# Patient Record
Sex: MALE | Race: WHITE | Employment: UNEMPLOYED | URBAN - METROPOLITAN AREA
[De-identification: names, ages, dates, MRNs, and addresses within clinical notes are randomized per-mention and may not be internally consistent; named-entity substitution may affect disease eponyms.]

---

## 2017-03-07 ENCOUNTER — APPOINTMENT (OUTPATIENT)
Dept: GENERAL RADIOLOGY | Age: 37
DRG: 638 | End: 2017-03-07
Attending: EMERGENCY MEDICINE
Payer: SELF-PAY

## 2017-03-07 ENCOUNTER — HOSPITAL ENCOUNTER (INPATIENT)
Age: 37
LOS: 2 days | Discharge: HOME | DRG: 638 | End: 2017-03-09
Attending: EMERGENCY MEDICINE | Admitting: EMERGENCY MEDICINE
Payer: SELF-PAY

## 2017-03-07 DIAGNOSIS — E86.0 DEHYDRATION: ICD-10-CM

## 2017-03-07 DIAGNOSIS — D72.829 LEUKOCYTOSIS, UNSPECIFIED TYPE: ICD-10-CM

## 2017-03-07 DIAGNOSIS — E08.10 DIABETIC KETOACIDOSIS WITHOUT COMA ASSOCIATED WITH DIABETES MELLITUS DUE TO UNDERLYING CONDITION (HCC): Primary | ICD-10-CM

## 2017-03-07 LAB
ALBUMIN SERPL BCP-MCNC: 4.6 G/DL (ref 3.5–5)
ALBUMIN/GLOB SERPL: 1.1 {RATIO} (ref 1.1–2.2)
ALP SERPL-CCNC: 274 U/L (ref 45–117)
ALT SERPL-CCNC: 80 U/L (ref 12–78)
AMPHET UR QL SCN: NEGATIVE
ANION GAP BLD CALC-SCNC: 12 MMOL/L (ref 5–15)
ANION GAP BLD CALC-SCNC: 26 MMOL/L (ref 5–15)
ANION GAP BLD CALC-SCNC: 33 MMOL/L (ref 5–15)
ANION GAP BLD CALC-SCNC: 36 MMOL/L (ref 5–15)
APPEARANCE UR: CLEAR
ARTERIAL PATENCY WRIST A: YES
AST SERPL W P-5'-P-CCNC: 30 U/L (ref 15–37)
ATRIAL RATE: 114 BPM
BACTERIA URNS QL MICRO: NEGATIVE /HPF
BARBITURATES UR QL SCN: NEGATIVE
BASE DEFICIT BLDA-SCNC: 20.1 MMOL/L
BASOPHILS # BLD AUTO: 0 K/UL (ref 0–0.1)
BASOPHILS # BLD: 0 % (ref 0–1)
BDY SITE: ABNORMAL
BENZODIAZ UR QL: NEGATIVE
BILIRUB SERPL-MCNC: 0.7 MG/DL (ref 0.2–1)
BILIRUB UR QL: NEGATIVE
BREATHS.SPONTANEOUS ON VENT: 18
BUN BLD-MCNC: 42 MG/DL (ref 9–20)
BUN SERPL-MCNC: 34 MG/DL (ref 6–20)
BUN SERPL-MCNC: 34 MG/DL (ref 6–20)
BUN SERPL-MCNC: 38 MG/DL (ref 6–20)
BUN/CREAT SERPL: 21 (ref 12–20)
BUN/CREAT SERPL: 25 (ref 12–20)
BUN/CREAT SERPL: 25 (ref 12–20)
CA-I BLD-MCNC: 1.14 MMOL/L (ref 1.12–1.32)
CALCIUM SERPL-MCNC: 7.8 MG/DL (ref 8.5–10.1)
CALCIUM SERPL-MCNC: 7.9 MG/DL (ref 8.5–10.1)
CALCIUM SERPL-MCNC: 9.2 MG/DL (ref 8.5–10.1)
CALCULATED P AXIS, ECG09: -24 DEGREES
CALCULATED R AXIS, ECG10: -60 DEGREES
CALCULATED T AXIS, ECG11: -12 DEGREES
CANNABINOIDS UR QL SCN: POSITIVE
CHLORIDE BLD-SCNC: 96 MMOL/L (ref 98–107)
CHLORIDE SERPL-SCNC: 108 MMOL/L (ref 97–108)
CHLORIDE SERPL-SCNC: 112 MMOL/L (ref 97–108)
CHLORIDE SERPL-SCNC: 89 MMOL/L (ref 97–108)
CO2 BLD-SCNC: 11 MMOL/L (ref 21–32)
CO2 SERPL-SCNC: 12 MMOL/L (ref 21–32)
CO2 SERPL-SCNC: 20 MMOL/L (ref 21–32)
CO2 SERPL-SCNC: 9 MMOL/L (ref 21–32)
COCAINE UR QL SCN: NEGATIVE
COLOR UR: ABNORMAL
CREAT BLD-MCNC: 1.2 MG/DL (ref 0.6–1.3)
CREAT SERPL-MCNC: 1.36 MG/DL (ref 0.7–1.3)
CREAT SERPL-MCNC: 1.37 MG/DL (ref 0.7–1.3)
CREAT SERPL-MCNC: 1.83 MG/DL (ref 0.7–1.3)
DIAGNOSIS, 93000: NORMAL
DRUG SCRN COMMENT,DRGCM: ABNORMAL
EOSINOPHIL # BLD: 0 K/UL (ref 0–0.4)
EOSINOPHIL NFR BLD: 0 % (ref 0–7)
EPITH CASTS URNS QL MICRO: ABNORMAL /LPF
ERYTHROCYTE [DISTWIDTH] IN BLOOD BY AUTOMATED COUNT: 13.2 % (ref 11.5–14.5)
EST. AVERAGE GLUCOSE BLD GHB EST-MCNC: 372 MG/DL
GLOBULIN SER CALC-MCNC: 4.1 G/DL (ref 2–4)
GLUCOSE BLD STRIP.AUTO-MCNC: 160 MG/DL (ref 65–100)
GLUCOSE BLD STRIP.AUTO-MCNC: 180 MG/DL (ref 65–100)
GLUCOSE BLD STRIP.AUTO-MCNC: 210 MG/DL (ref 65–100)
GLUCOSE BLD STRIP.AUTO-MCNC: 215 MG/DL (ref 65–100)
GLUCOSE BLD STRIP.AUTO-MCNC: 227 MG/DL (ref 65–100)
GLUCOSE BLD STRIP.AUTO-MCNC: 236 MG/DL (ref 65–100)
GLUCOSE BLD STRIP.AUTO-MCNC: 250 MG/DL (ref 65–100)
GLUCOSE BLD STRIP.AUTO-MCNC: 333 MG/DL (ref 65–100)
GLUCOSE BLD STRIP.AUTO-MCNC: 413 MG/DL (ref 65–100)
GLUCOSE BLD STRIP.AUTO-MCNC: 541 MG/DL (ref 65–100)
GLUCOSE BLD STRIP.AUTO-MCNC: >600 MG/DL (ref 65–100)
GLUCOSE BLD-MCNC: 646 MG/DL (ref 75–110)
GLUCOSE SERPL-MCNC: 236 MG/DL (ref 65–100)
GLUCOSE SERPL-MCNC: 307 MG/DL (ref 65–100)
GLUCOSE SERPL-MCNC: 660 MG/DL (ref 65–100)
GLUCOSE UR STRIP.AUTO-MCNC: >1000 MG/DL
HBA1C MFR BLD: 14.6 % (ref 4.2–6.3)
HCO3 BLDA-SCNC: 6 MMOL/L (ref 22–26)
HCT VFR BLD AUTO: 46.5 % (ref 36.6–50.3)
HCT VFR BLD CALC: 54 % (ref 36.6–50.3)
HGB BLD-MCNC: 16.6 G/DL (ref 12.1–17)
HGB BLD-MCNC: 18.4 GM/DL (ref 12.1–17)
HGB UR QL STRIP: NEGATIVE
HYALINE CASTS URNS QL MICRO: ABNORMAL /LPF (ref 0–5)
KETONES UR QL STRIP.AUTO: 80 MG/DL
LEUKOCYTE ESTERASE UR QL STRIP.AUTO: NEGATIVE
LIPASE SERPL-CCNC: 79 U/L (ref 73–393)
LYMPHOCYTES # BLD AUTO: 14 % (ref 12–49)
LYMPHOCYTES # BLD: 3.2 K/UL (ref 0.8–3.5)
MAGNESIUM SERPL-MCNC: 2.3 MG/DL (ref 1.6–2.4)
MAGNESIUM SERPL-MCNC: 3 MG/DL (ref 1.6–2.4)
MCH RBC QN AUTO: 32.9 PG (ref 26–34)
MCHC RBC AUTO-ENTMCNC: 35.7 G/DL (ref 30–36.5)
MCV RBC AUTO: 92.3 FL (ref 80–99)
METHADONE UR QL: NEGATIVE
MONOCYTES # BLD: 1.2 K/UL (ref 0–1)
MONOCYTES NFR BLD AUTO: 5 % (ref 5–13)
NEUTS SEG # BLD: 18.7 K/UL (ref 1.8–8)
NEUTS SEG NFR BLD AUTO: 81 % (ref 32–75)
NITRITE UR QL STRIP.AUTO: NEGATIVE
OPIATES UR QL: NEGATIVE
P-R INTERVAL, ECG05: 144 MS
PCO2 BLDA: 16 MMHG (ref 35–45)
PCP UR QL: NEGATIVE
PH BLDA: 7.18 [PH] (ref 7.35–7.45)
PH UR STRIP: 5 [PH] (ref 5–8)
PHOSPHATE SERPL-MCNC: 1.4 MG/DL (ref 2.6–4.7)
PHOSPHATE SERPL-MCNC: 8 MG/DL (ref 2.6–4.7)
PLATELET # BLD AUTO: 302 K/UL (ref 150–400)
PO2 BLDA: 134 MMHG (ref 80–100)
POTASSIUM BLD-SCNC: 4.7 MMOL/L (ref 3.5–5.1)
POTASSIUM SERPL-SCNC: 3.7 MMOL/L (ref 3.5–5.1)
POTASSIUM SERPL-SCNC: 4.7 MMOL/L (ref 3.5–5.1)
POTASSIUM SERPL-SCNC: 4.7 MMOL/L (ref 3.5–5.1)
PROT SERPL-MCNC: 8.7 G/DL (ref 6.4–8.2)
PROT UR STRIP-MCNC: NEGATIVE MG/DL
Q-T INTERVAL, ECG07: 350 MS
QRS DURATION, ECG06: 94 MS
QTC CALCULATION (BEZET), ECG08: 482 MS
RBC # BLD AUTO: 5.04 M/UL (ref 4.1–5.7)
RBC #/AREA URNS HPF: ABNORMAL /HPF (ref 0–5)
RBC MORPH BLD: ABNORMAL
SAO2 % BLD: 98 % (ref 92–97)
SAO2% DEVICE SAO2% SENSOR NAME: ABNORMAL
SERVICE CMNT-IMP: ABNORMAL
SODIUM BLD-SCNC: 134 MMOL/L (ref 136–145)
SODIUM SERPL-SCNC: 134 MMOL/L (ref 136–145)
SODIUM SERPL-SCNC: 144 MMOL/L (ref 136–145)
SODIUM SERPL-SCNC: 146 MMOL/L (ref 136–145)
SP GR UR REFRACTOMETRY: 1.03 (ref 1–1.03)
SPECIMEN SITE: ABNORMAL
TROPONIN I SERPL-MCNC: <0.04 NG/ML
UROBILINOGEN UR QL STRIP.AUTO: 0.2 EU/DL (ref 0.2–1)
VENTRICULAR RATE, ECG03: 114 BPM
WBC # BLD AUTO: 23.1 K/UL (ref 4.1–11.1)
WBC URNS QL MICRO: ABNORMAL /HPF (ref 0–4)

## 2017-03-07 PROCEDURE — 74011000250 HC RX REV CODE- 250: Performed by: EMERGENCY MEDICINE

## 2017-03-07 PROCEDURE — 65660000000 HC RM CCU STEPDOWN

## 2017-03-07 PROCEDURE — 74011000258 HC RX REV CODE- 258: Performed by: EMERGENCY MEDICINE

## 2017-03-07 PROCEDURE — 84484 ASSAY OF TROPONIN QUANT: CPT | Performed by: EMERGENCY MEDICINE

## 2017-03-07 PROCEDURE — 80048 BASIC METABOLIC PNL TOTAL CA: CPT | Performed by: INTERNAL MEDICINE

## 2017-03-07 PROCEDURE — 74011636637 HC RX REV CODE- 636/637: Performed by: EMERGENCY MEDICINE

## 2017-03-07 PROCEDURE — 36600 WITHDRAWAL OF ARTERIAL BLOOD: CPT | Performed by: EMERGENCY MEDICINE

## 2017-03-07 PROCEDURE — 85025 COMPLETE CBC W/AUTO DIFF WBC: CPT | Performed by: EMERGENCY MEDICINE

## 2017-03-07 PROCEDURE — 83735 ASSAY OF MAGNESIUM: CPT | Performed by: EMERGENCY MEDICINE

## 2017-03-07 PROCEDURE — 80047 BASIC METABLC PNL IONIZED CA: CPT

## 2017-03-07 PROCEDURE — 83735 ASSAY OF MAGNESIUM: CPT | Performed by: INTERNAL MEDICINE

## 2017-03-07 PROCEDURE — 94760 N-INVAS EAR/PLS OXIMETRY 1: CPT

## 2017-03-07 PROCEDURE — 74011250636 HC RX REV CODE- 250/636: Performed by: EMERGENCY MEDICINE

## 2017-03-07 PROCEDURE — 82803 BLOOD GASES ANY COMBINATION: CPT | Performed by: EMERGENCY MEDICINE

## 2017-03-07 PROCEDURE — 96375 TX/PRO/DX INJ NEW DRUG ADDON: CPT

## 2017-03-07 PROCEDURE — 96361 HYDRATE IV INFUSION ADD-ON: CPT

## 2017-03-07 PROCEDURE — 99285 EMERGENCY DEPT VISIT HI MDM: CPT

## 2017-03-07 PROCEDURE — 96376 TX/PRO/DX INJ SAME DRUG ADON: CPT

## 2017-03-07 PROCEDURE — 96374 THER/PROPH/DIAG INJ IV PUSH: CPT

## 2017-03-07 PROCEDURE — 36415 COLL VENOUS BLD VENIPUNCTURE: CPT | Performed by: INTERNAL MEDICINE

## 2017-03-07 PROCEDURE — 84100 ASSAY OF PHOSPHORUS: CPT | Performed by: INTERNAL MEDICINE

## 2017-03-07 PROCEDURE — 83036 HEMOGLOBIN GLYCOSYLATED A1C: CPT | Performed by: EMERGENCY MEDICINE

## 2017-03-07 PROCEDURE — 80048 BASIC METABOLIC PNL TOTAL CA: CPT | Performed by: EMERGENCY MEDICINE

## 2017-03-07 PROCEDURE — 84100 ASSAY OF PHOSPHORUS: CPT | Performed by: EMERGENCY MEDICINE

## 2017-03-07 PROCEDURE — 80307 DRUG TEST PRSMV CHEM ANLYZR: CPT | Performed by: EMERGENCY MEDICINE

## 2017-03-07 PROCEDURE — 71010 XR CHEST PORT: CPT

## 2017-03-07 PROCEDURE — 81003 URINALYSIS AUTO W/O SCOPE: CPT | Performed by: EMERGENCY MEDICINE

## 2017-03-07 PROCEDURE — 36415 COLL VENOUS BLD VENIPUNCTURE: CPT | Performed by: EMERGENCY MEDICINE

## 2017-03-07 PROCEDURE — 82962 GLUCOSE BLOOD TEST: CPT

## 2017-03-07 PROCEDURE — 74011000250 HC RX REV CODE- 250: Performed by: INTERNAL MEDICINE

## 2017-03-07 PROCEDURE — 74011250637 HC RX REV CODE- 250/637: Performed by: INTERNAL MEDICINE

## 2017-03-07 PROCEDURE — 93005 ELECTROCARDIOGRAM TRACING: CPT

## 2017-03-07 PROCEDURE — 80053 COMPREHEN METABOLIC PANEL: CPT | Performed by: EMERGENCY MEDICINE

## 2017-03-07 PROCEDURE — 74011250636 HC RX REV CODE- 250/636: Performed by: INTERNAL MEDICINE

## 2017-03-07 PROCEDURE — 83690 ASSAY OF LIPASE: CPT | Performed by: EMERGENCY MEDICINE

## 2017-03-07 RX ORDER — MUPIROCIN 20 MG/G
OINTMENT TOPICAL EVERY 12 HOURS
Status: DISCONTINUED | OUTPATIENT
Start: 2017-03-07 | End: 2017-03-07

## 2017-03-07 RX ORDER — POTASSIUM CHLORIDE AND SODIUM CHLORIDE 450; 150 MG/100ML; MG/100ML
INJECTION, SOLUTION INTRAVENOUS CONTINUOUS
Status: DISCONTINUED | OUTPATIENT
Start: 2017-03-07 | End: 2017-03-07

## 2017-03-07 RX ORDER — MAGNESIUM SULFATE 100 %
4 CRYSTALS MISCELLANEOUS AS NEEDED
Status: DISCONTINUED | OUTPATIENT
Start: 2017-03-07 | End: 2017-03-09 | Stop reason: HOSPADM

## 2017-03-07 RX ORDER — SODIUM CHLORIDE 0.9 % (FLUSH) 0.9 %
5-10 SYRINGE (ML) INJECTION AS NEEDED
Status: DISCONTINUED | OUTPATIENT
Start: 2017-03-07 | End: 2017-03-07

## 2017-03-07 RX ORDER — DEXTROSE 50 % IN WATER (D50W) INTRAVENOUS SYRINGE
12.5-25 AS NEEDED
Status: DISCONTINUED | OUTPATIENT
Start: 2017-03-07 | End: 2017-03-09 | Stop reason: HOSPADM

## 2017-03-07 RX ORDER — FAMOTIDINE 10 MG/ML
20 INJECTION INTRAVENOUS
Status: DISCONTINUED | OUTPATIENT
Start: 2017-03-07 | End: 2017-03-07 | Stop reason: SDUPTHER

## 2017-03-07 RX ORDER — MORPHINE SULFATE 10 MG/ML
2 INJECTION, SOLUTION INTRAMUSCULAR; INTRAVENOUS
Status: DISCONTINUED | OUTPATIENT
Start: 2017-03-07 | End: 2017-03-09 | Stop reason: HOSPADM

## 2017-03-07 RX ORDER — HEPARIN SODIUM 5000 [USP'U]/ML
5000 INJECTION, SOLUTION INTRAVENOUS; SUBCUTANEOUS EVERY 8 HOURS
Status: DISCONTINUED | OUTPATIENT
Start: 2017-03-07 | End: 2017-03-08

## 2017-03-07 RX ORDER — DEXTROSE, SODIUM CHLORIDE, AND POTASSIUM CHLORIDE 5; .9; .15 G/100ML; G/100ML; G/100ML
150 INJECTION INTRAVENOUS CONTINUOUS
Status: DISCONTINUED | OUTPATIENT
Start: 2017-03-07 | End: 2017-03-07

## 2017-03-07 RX ORDER — DEXTROSE, SODIUM CHLORIDE, AND POTASSIUM CHLORIDE 5; .45; .3 G/100ML; G/100ML; G/100ML
INJECTION INTRAVENOUS CONTINUOUS
Status: DISCONTINUED | OUTPATIENT
Start: 2017-03-07 | End: 2017-03-08

## 2017-03-07 RX ORDER — ONDANSETRON 2 MG/ML
4 INJECTION INTRAMUSCULAR; INTRAVENOUS
Status: DISCONTINUED | OUTPATIENT
Start: 2017-03-07 | End: 2017-03-09 | Stop reason: HOSPADM

## 2017-03-07 RX ORDER — POTASSIUM CHLORIDE 7.45 MG/ML
10 INJECTION INTRAVENOUS
Status: COMPLETED | OUTPATIENT
Start: 2017-03-07 | End: 2017-03-07

## 2017-03-07 RX ORDER — SODIUM CHLORIDE 0.9 % (FLUSH) 0.9 %
5-10 SYRINGE (ML) INJECTION AS NEEDED
Status: DISCONTINUED | OUTPATIENT
Start: 2017-03-07 | End: 2017-03-09 | Stop reason: HOSPADM

## 2017-03-07 RX ORDER — MUPIROCIN 20 MG/G
OINTMENT TOPICAL EVERY 12 HOURS
Status: DISCONTINUED | OUTPATIENT
Start: 2017-03-07 | End: 2017-03-09 | Stop reason: HOSPADM

## 2017-03-07 RX ORDER — SODIUM CHLORIDE 0.9 % (FLUSH) 0.9 %
5-10 SYRINGE (ML) INJECTION EVERY 8 HOURS
Status: DISCONTINUED | OUTPATIENT
Start: 2017-03-07 | End: 2017-03-09 | Stop reason: HOSPADM

## 2017-03-07 RX ORDER — SODIUM CHLORIDE 0.9 % (FLUSH) 0.9 %
5-10 SYRINGE (ML) INJECTION EVERY 8 HOURS
Status: DISCONTINUED | OUTPATIENT
Start: 2017-03-07 | End: 2017-03-07

## 2017-03-07 RX ADMIN — POTASSIUM CHLORIDE 10 MEQ: 10 INJECTION, SOLUTION INTRAVENOUS at 18:22

## 2017-03-07 RX ADMIN — Medication 10 ML: at 23:03

## 2017-03-07 RX ADMIN — FAMOTIDINE 20 MG: 10 INJECTION, SOLUTION INTRAVENOUS at 09:11

## 2017-03-07 RX ADMIN — SODIUM CHLORIDE 8.2 UNITS/HR: 900 INJECTION, SOLUTION INTRAVENOUS at 12:55

## 2017-03-07 RX ADMIN — SODIUM CHLORIDE 2000 ML: 900 INJECTION, SOLUTION INTRAVENOUS at 09:11

## 2017-03-07 RX ADMIN — MUPIROCIN: 20 OINTMENT TOPICAL at 22:50

## 2017-03-07 RX ADMIN — Medication 10 ML: at 14:07

## 2017-03-07 RX ADMIN — DEXTROSE MONOHYDRATE, SODIUM CHLORIDE, AND POTASSIUM CHLORIDE: 50; 4.5; 2.98 INJECTION, SOLUTION INTRAVENOUS at 15:27

## 2017-03-07 RX ADMIN — HEPARIN SODIUM 5000 UNITS: 5000 INJECTION, SOLUTION INTRAVENOUS; SUBCUTANEOUS at 14:11

## 2017-03-07 RX ADMIN — POTASSIUM CHLORIDE 10 MEQ: 10 INJECTION, SOLUTION INTRAVENOUS at 14:37

## 2017-03-07 RX ADMIN — SODIUM CHLORIDE 9.6 UNITS/HR: 900 INJECTION, SOLUTION INTRAVENOUS at 10:27

## 2017-03-07 RX ADMIN — SODIUM CHLORIDE AND POTASSIUM CHLORIDE: 4.5; 1.49 INJECTION, SOLUTION INTRAVENOUS at 12:58

## 2017-03-07 RX ADMIN — HEPARIN SODIUM 5000 UNITS: 5000 INJECTION, SOLUTION INTRAVENOUS; SUBCUTANEOUS at 22:59

## 2017-03-07 RX ADMIN — ONDANSETRON HYDROCHLORIDE 4 MG: 2 INJECTION, SOLUTION INTRAMUSCULAR; INTRAVENOUS at 13:48

## 2017-03-07 RX ADMIN — POTASSIUM CHLORIDE 10 MEQ: 10 INJECTION, SOLUTION INTRAVENOUS at 16:03

## 2017-03-07 RX ADMIN — DEXTROSE MONOHYDRATE, SODIUM CHLORIDE, AND POTASSIUM CHLORIDE: 50; 4.5; 2.98 INJECTION, SOLUTION INTRAVENOUS at 22:49

## 2017-03-07 RX ADMIN — SODIUM CHLORIDE 7.1 UNITS/HR: 900 INJECTION, SOLUTION INTRAVENOUS at 11:35

## 2017-03-07 RX ADMIN — MORPHINE SULFATE 2 MG: 10 INJECTION INTRAMUSCULAR; INTRAVENOUS; SUBCUTANEOUS at 13:51

## 2017-03-07 RX ADMIN — SODIUM CHLORIDE 10 MG: 9 INJECTION INTRAMUSCULAR; INTRAVENOUS; SUBCUTANEOUS at 16:01

## 2017-03-07 RX ADMIN — INSULIN HUMAN 10 UNITS: 100 INJECTION, SOLUTION PARENTERAL at 09:45

## 2017-03-07 NOTE — H&P
[facility], [address]   HISTORY AND PHYSICAL       Name:  Trev Angeles   MR#:  [id number]   :  1980   Account #:  [de-identified]        Date of Adm:  2017       CHIEF COMPLAINT: Abdominal pain and vomiting. HISTORY OF PRESENT ILLNESS: The patient is a 72-year-old white   male with a past medical history as documented below. The patient   came to the hospital complaining of nausea, vomiting and abdominal   pain since yesterday. As per the patient, he started having all of those   symptoms yesterday, he checked his blood sugars this morning and   the reading was high, so he decided to come to the ER. The patient   also reported that he had a couple of episodes of diarrhea since   yesterday, but it has subsided now. He denies any other complaints   right now. Not talking that much and lethargic. PAST MEDICAL HISTORY: Diabetes type 1, depression, bipolar   disorder, noncompliance with the medications, seizures. PAST SURGICAL HISTORY: Left with extraction, left foot surgery. FAMILY HISTORY: Diabetes, hypertension, heart disease in mother   and father. SOCIAL HISTORY: He is single. Smokes 1 pack per day. No drug   abuse or alcohol abuse reported. ALLERGIES: NONE. HOME MEDICATIONS: The patient is unable to tell me about the   home medications right now. REVIEW OF SYSTEMS: Cannot be done as the patient is lethargic,   not taking part. VITAL SIGNS: Temperature 97.4, pulse is 115, respiratory rate 14,   blood pressure 133/70, saturating 98% on room air. LABORATORY: White count 23.1, hemoglobin 16.6 and hematocrit   46.5, platelet count is CO2. CHEMISTRY: Sodium 134, potassium 4.7,   chloride 89, bicarbonate 9, BUN 38, creatinine 1.83, glucose is 660,   calcium is 9.2, anion gap is 36. ABGs showed pH of 7.18, pCO2 of 16, pO2 of 134 and bicarbonate of   6.      Chest x-ray is normal.      EKG sinus tachycardic at 114 beats per minute. Urinalysis is positive   for ketones and glucose. PHYSICAL EXAMINATION   GENERAL: The patient is lethargic, lying on the bed, looks in mild   distress secondary to abdominal pain, well-developed man. HEENT: PERRLA, EOMI. No icterus. Oropharynx moist. No   abscesses. Throat clear without exudate. Pierced tongue. Dentition is   intact. NECK: Supple, no JVD or carotid bruit. RESPIRATORY: Lungs are clear to auscultation bilaterally. Regular   rate and rhythm. No accessory muscle use. Good effort. CHEST: Nontender. CARDIOVASCULAR: S1, S2 normal, regular rhythm, tachycardic, no   edema. GASTROINTESTINAL: Abdomen is soft. Diffuse tenderness present all   over. Positive bowel sounds. MUSCULOSKELETAL: No swelling, clubbing or edema. Range of   motion and strength cannot be tested. NEUROLOGIC: Cannot be done right now. The patient is lethargic. SKIN: No ulceration, lesions, rash, cyanosis. Warm and dry. Turgor   intact. PSYCHIATRIC: The patient is lethargic, further exam cannot be done. ASSESSMENT   1. Diabetic ketoacidosis. 2. Type 1 diabetes, uncontrolled. 3. Leukocytosis. 4. Acute renal failure. PLAN: I will admit the patient to step-down unit. I will start the patient   on insulin drip, IV fluids and DKA protocol. As of now, I will put the   patient n.p.o. and once the patient's anion gap is closed, we will start   him on a diet and subcu insulin. I will check his BMP every 4 hours,   fingersticks every 2 hours and put him on bed rest. Pt's corrected   sodium is close to 144, so will start 1/2 NS with potassium. I will check   his A1c in the morning and for DVT prophylaxis, I will start the patient on   heparin subcu. TIME SPENT ON ADMISSION: 60-70 minutes.         MD CARO Roper / Arian   D:  03/07/2017   14:24   T:  03/07/2017   14:57   Job #:  [id number]

## 2017-03-07 NOTE — PROGRESS NOTES
1345  Bedside report in ED by RN, then patient transported to ) by CCU RN and PCT. Transferred to CCU bed, connected to monitors. Patient drowsy, minimally cooperative with care. 1348  Medicated with zofran 4 mg IV for nausea and vomiting.   1351  Medicated with morphine 2 mg IV for abdominal pain. 1400  Dr. Carlos Yap at bedside to assess patient. 1500  Napping at intervals with periods of nausea and vomiting.  1601  Continues with nausea and vomiting. Dr. Carlos Yap notified; medicated with compazine 10 mg IV.  1700  Napping at intervals; reports nausea is \"better. \"  1800  Napping at intervals. Vitals stable. 1900  Chemistry called to Dr. Alfredo Bowling (on call for Dr. Carlos Yap). Bedside and Verbal shift change report given to Ryder Gregory RN (oncoming nurse) by Dominic Sosa RN (offgoing nurse). Report included the following information SBAR, Kardex, Procedure Summary, Intake/Output, MAR, Accordion, Recent Results, Med Rec Status, Cardiac Rhythm ST and Alarm Parameters .
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
## 2017-03-07 NOTE — ROUTINE PROCESS
TRANSFER - OUT REPORT:    Verbal report given to Mary Kate selwyn Wiseman  being transferred to [phone] for routine progression of care       Report consisted of patients Situation, Background, Assessment and   Recommendations(SBAR). Information from the following report(s) SBAR, Kardex, ED Summary, Procedure Summary and MAR was reviewed with the receiving nurse. Lines:   Peripheral IV 03/07/17 Left Antecubital (Active)   Site Assessment Clean, dry, & intact 3/7/2017  9:00 AM   Phlebitis Assessment 0 3/7/2017  9:00 AM   Infiltration Assessment 0 3/7/2017  9:00 AM   Dressing Status Clean, dry, & intact 3/7/2017  9:00 AM   Dressing Type Tape;Transparent 3/7/2017  9:00 AM       Peripheral IV 03/07/17 Right Antecubital (Active)   Site Assessment Clean, dry, & intact 3/7/2017 10:43 AM   Phlebitis Assessment 0 3/7/2017 10:43 AM   Infiltration Assessment 0 3/7/2017 10:43 AM   Dressing Status Clean, dry, & intact 3/7/2017 10:43 AM        Opportunity for questions and clarification was provided.       Patient transported with:   Registered Nurse

## 2017-03-07 NOTE — IP AVS SNAPSHOT
[address] [facility] 
[phone] Patient: Aaron Wright MRN: [id number] BWS:[id number] You are allergic to the following No active allergies Recent Documentation Height Weight BMI Smoking Status 1.727 m 60.8 kg 20.37 kg/m2 Current Every Day Smoker Emergency Contacts  (Rel.) Home Phone Work Phone Mobile Phone Shruti Small (Mother) [phone] -- -- About your hospitalization You were admitted on:  March 7, 2017 You last received care in the:  [facility] 1 MEDICAL ONCOLOGY You were discharged on:  March 9, 2017 Why you were hospitalized Your primary diagnosis was:  Not on File Your diagnoses also included:  Dka (Diabetic Ketoacidoses) ([facility]) Providers Seen During Your Hospitalizations Provider Role Specialty Primary office phone Marcos Quiñonez MD Attending Provider Emergency Medicine [phone] Hermes Pepe MD Attending Provider Internal Medicine [phone] Your Primary Care Physician (PCP) Primary Care Physician Office Phone Office Fax OTHER, PHYS ** None ** ** None ** Follow-up Information Follow up With Details Comments Contact Info Schedule an appointment as soon as possible for a visit in 2 weeks  Patient follows up in THE [facility] for years and receives the Lantus Vials from them Current Discharge Medication List  
  
CONTINUE these medications which have NOT CHANGED Dose & Instructions Dispensing Information Comments Morning Noon Evening Bedtime LANTUS 100 unit/mL injection Generic drug:  insulin glargine Your next dose is: Today, Tomorrow Other:  _________ Dose:  46 Units 46 Units by SubCUTAneous route daily. Refills:  0 NovoLIN R 100 unit/mL injection Generic drug:  insulin regular Your next dose is: Today, Tomorrow Other:  _________  
   
   
 by SubCUTAneous route as needed (>200 give 6 units, >300 give 8 units, >400 give 10 units). Refills:  0 Discharge Instructions HOSPITALIST DISCHARGE INSTRUCTIONS 
 
NAME: Merlinda Andrews :  1980 MRN:  [id number] Date/Time:  3/9/2017 7:45 AM 
 
ADMIT DATE: 3/7/2017 DISCHARGE DATE: 3/9/2017 · It is important that you take the medication exactly as they are prescribed. · Keep your medication in the bottles provided by the pharmacist and keep a list of the medication names, dosages, and times to be taken in your wallet. · Do not take other medications without consulting your doctor. What to do at [facility] Recommended diet:  Diabetic Diet Recommended activity: Activity as tolerated If you have questions regarding the hospital related prescriptions or hospital related issues please call [facility] at . You can always direct your questions to your primary care doctor if you are unable to reach your hospital physician; your PCP works as an extension of your hospital doctor just like your hospital doctor is an extension of your PCP for your time at the hospital [facility], [facility]) If you experience any of the following symptoms then please call your primary care physician or return to the emergency room if you cannot get hold of your doctor: 
 
Fever, chills, nausea, vomiting, or persistent diarrhea Worsening weakness or new problems with your speech or balance Dark stools or visible blood in your stools New Leg swelling or shortness of breath as this could be signs of a clot Additional Instructions: 
 
 
Bring these papers with you to your follow up appointments.  The papers will help your doctors be sure to continue the care plan from the hospital. 
 
 
 
 
 
 
Information obtained by : 
 I understand that if any problems occur once I am at home I am to contact my physician. I understand and acknowledge receipt of the instructions indicated above. Physician's or R.N.'s Signature                                                                  Date/Time Patient or Representative Signature Discharge Orders None [employer] Announcement We are excited to announce that we are making your provider's discharge notes available to you in [employer]. You will see these notes when they are completed and signed by the physician that discharged you from your recent hospital stay. If you have any questions or concerns about any information you see in [employer], please call the Health Information Department where you were seen or reach out to your Primary Care Provider for more information about your plan of care. Introducing [facility] [place] [facility]! Dear Yvonne Needs: 
Thank you for requesting a [employer] account. Our records indicate that you already have an active [employer] account. You can access your account anytime at [email or web address]. [email or web address] Did you know that you can access your hospital and ER discharge instructions at any time in [employer]? You can also review all of your test results from your hospital stay or ER visit. Additional Information If you have questions, please visit the Frequently Asked Questions section of the [employer] website at [email or web address] Remember, [employer] is NOT to be used for urgent needs. For medical emergencies, dial 911. Now available from your iPhone and Android! General Information Please provide this summary of care documentation to your next provider. Patient Signature:  ____________________________________________________________ Date:  ____________________________________________________________  
  
[facility] Provider Signature:  ____________________________________________________________ Date:  ____________________________________________________________

## 2017-03-07 NOTE — ED NOTES
Assumed care of pt. Pt. Placed in position of comfort. Call bell within reach. Pt. Made aware of care plan.

## 2017-03-07 NOTE — PROGRESS NOTES
3/7/2017    INTENSIVIST PROGRESS NOTE:     Patient seen and evaluated. Admitted today with DKA. Reports he ran out of his Lantus (46 units daily) yesterday. He reports he has been using his sliding scale (regular insulin), however. He is very thirsty and has some nausea.       Visit Vitals    /70 (BP 1 Location: Right arm, BP Patient Position: At rest)    Pulse (!) 118    Temp 98.4 °F (36.9 °C)    Resp 22    Ht 5' 8\" (1.727 m)    Wt 60.8 kg (134 lb)    SpO2 100%    BMI 20.37 kg/m2     GEN: NAD  CV: RRR  Lungs: CTA B    Labs reviewed  chest X-ray reviewed    A/P:  - DKA - insulin drip per DKA protocol, IV fluids - monitor anion gap, check A1c  - acute renal failure - IV fluids  - hyponatremia - change from hypotonic to isotonic fluids  - replete electrolytes  - DVT prophylaxis    Will assist on disposition planning   Reji Garcia MD

## 2017-03-08 LAB
ANION GAP BLD CALC-SCNC: 7 MMOL/L (ref 5–15)
ANION GAP BLD CALC-SCNC: 9 MMOL/L (ref 5–15)
BASOPHILS # BLD AUTO: 0 K/UL (ref 0–0.1)
BASOPHILS # BLD: 0 % (ref 0–1)
BUN SERPL-MCNC: 28 MG/DL (ref 6–20)
BUN SERPL-MCNC: 31 MG/DL (ref 6–20)
BUN/CREAT SERPL: 26 (ref 12–20)
BUN/CREAT SERPL: 28 (ref 12–20)
CALCIUM SERPL-MCNC: 7.9 MG/DL (ref 8.5–10.1)
CALCIUM SERPL-MCNC: 8 MG/DL (ref 8.5–10.1)
CHLORIDE SERPL-SCNC: 111 MMOL/L (ref 97–108)
CHLORIDE SERPL-SCNC: 114 MMOL/L (ref 97–108)
CO2 SERPL-SCNC: 23 MMOL/L (ref 21–32)
CO2 SERPL-SCNC: 23 MMOL/L (ref 21–32)
CREAT SERPL-MCNC: 1.01 MG/DL (ref 0.7–1.3)
CREAT SERPL-MCNC: 1.19 MG/DL (ref 0.7–1.3)
EOSINOPHIL # BLD: 0 K/UL (ref 0–0.4)
EOSINOPHIL NFR BLD: 0 % (ref 0–7)
ERYTHROCYTE [DISTWIDTH] IN BLOOD BY AUTOMATED COUNT: 13.7 % (ref 11.5–14.5)
EST. AVERAGE GLUCOSE BLD GHB EST-MCNC: 372 MG/DL
GLUCOSE BLD STRIP.AUTO-MCNC: 109 MG/DL (ref 65–100)
GLUCOSE BLD STRIP.AUTO-MCNC: 110 MG/DL (ref 65–100)
GLUCOSE BLD STRIP.AUTO-MCNC: 127 MG/DL (ref 65–100)
GLUCOSE BLD STRIP.AUTO-MCNC: 128 MG/DL (ref 65–100)
GLUCOSE BLD STRIP.AUTO-MCNC: 137 MG/DL (ref 65–100)
GLUCOSE BLD STRIP.AUTO-MCNC: 138 MG/DL (ref 65–100)
GLUCOSE BLD STRIP.AUTO-MCNC: 141 MG/DL (ref 65–100)
GLUCOSE BLD STRIP.AUTO-MCNC: 144 MG/DL (ref 65–100)
GLUCOSE BLD STRIP.AUTO-MCNC: 144 MG/DL (ref 65–100)
GLUCOSE BLD STRIP.AUTO-MCNC: 147 MG/DL (ref 65–100)
GLUCOSE BLD STRIP.AUTO-MCNC: 180 MG/DL (ref 65–100)
GLUCOSE BLD STRIP.AUTO-MCNC: 188 MG/DL (ref 65–100)
GLUCOSE BLD STRIP.AUTO-MCNC: 222 MG/DL (ref 65–100)
GLUCOSE BLD STRIP.AUTO-MCNC: 50 MG/DL (ref 65–100)
GLUCOSE SERPL-MCNC: 130 MG/DL (ref 65–100)
GLUCOSE SERPL-MCNC: 147 MG/DL (ref 65–100)
HBA1C MFR BLD: 14.6 % (ref 4.2–6.3)
HCT VFR BLD AUTO: 35.1 % (ref 36.6–50.3)
HGB BLD-MCNC: 12.3 G/DL (ref 12.1–17)
LYMPHOCYTES # BLD AUTO: 16 % (ref 12–49)
LYMPHOCYTES # BLD: 2.4 K/UL (ref 0.8–3.5)
MAGNESIUM SERPL-MCNC: 2.2 MG/DL (ref 1.6–2.4)
MCH RBC QN AUTO: 31.9 PG (ref 26–34)
MCHC RBC AUTO-ENTMCNC: 35 G/DL (ref 30–36.5)
MCV RBC AUTO: 90.9 FL (ref 80–99)
MONOCYTES # BLD: 1.4 K/UL (ref 0–1)
MONOCYTES NFR BLD AUTO: 9 % (ref 5–13)
NEUTS SEG # BLD: 11.9 K/UL (ref 1.8–8)
NEUTS SEG NFR BLD AUTO: 75 % (ref 32–75)
PHOSPHATE SERPL-MCNC: 1.8 MG/DL (ref 2.6–4.7)
PLATELET # BLD AUTO: 287 K/UL (ref 150–400)
POTASSIUM SERPL-SCNC: 3.8 MMOL/L (ref 3.5–5.1)
POTASSIUM SERPL-SCNC: 4.1 MMOL/L (ref 3.5–5.1)
RBC # BLD AUTO: 3.86 M/UL (ref 4.1–5.7)
SERVICE CMNT-IMP: ABNORMAL
SODIUM SERPL-SCNC: 143 MMOL/L (ref 136–145)
SODIUM SERPL-SCNC: 144 MMOL/L (ref 136–145)
WBC # BLD AUTO: 15.7 K/UL (ref 4.1–11.1)

## 2017-03-08 PROCEDURE — 74011250636 HC RX REV CODE- 250/636: Performed by: EMERGENCY MEDICINE

## 2017-03-08 PROCEDURE — 82962 GLUCOSE BLOOD TEST: CPT

## 2017-03-08 PROCEDURE — 74011636637 HC RX REV CODE- 636/637: Performed by: INTERNAL MEDICINE

## 2017-03-08 PROCEDURE — 83036 HEMOGLOBIN GLYCOSYLATED A1C: CPT | Performed by: EMERGENCY MEDICINE

## 2017-03-08 PROCEDURE — 84100 ASSAY OF PHOSPHORUS: CPT | Performed by: EMERGENCY MEDICINE

## 2017-03-08 PROCEDURE — 85025 COMPLETE CBC W/AUTO DIFF WBC: CPT | Performed by: EMERGENCY MEDICINE

## 2017-03-08 PROCEDURE — 80048 BASIC METABOLIC PNL TOTAL CA: CPT | Performed by: EMERGENCY MEDICINE

## 2017-03-08 PROCEDURE — 74011000250 HC RX REV CODE- 250: Performed by: EMERGENCY MEDICINE

## 2017-03-08 PROCEDURE — 74011250636 HC RX REV CODE- 250/636: Performed by: INTERNAL MEDICINE

## 2017-03-08 PROCEDURE — 74011000250 HC RX REV CODE- 250: Performed by: INTERNAL MEDICINE

## 2017-03-08 PROCEDURE — 65270000015 HC RM PRIVATE ONCOLOGY

## 2017-03-08 PROCEDURE — 83735 ASSAY OF MAGNESIUM: CPT | Performed by: EMERGENCY MEDICINE

## 2017-03-08 RX ORDER — INSULIN LISPRO 100 [IU]/ML
INJECTION, SOLUTION INTRAVENOUS; SUBCUTANEOUS
Status: DISCONTINUED | OUTPATIENT
Start: 2017-03-08 | End: 2017-03-09 | Stop reason: HOSPADM

## 2017-03-08 RX ORDER — INSULIN GLARGINE 100 [IU]/ML
46 INJECTION, SOLUTION SUBCUTANEOUS DAILY
Status: DISCONTINUED | OUTPATIENT
Start: 2017-03-08 | End: 2017-03-09

## 2017-03-08 RX ORDER — INSULIN GLARGINE 100 [IU]/ML
36 INJECTION, SOLUTION SUBCUTANEOUS
Status: DISCONTINUED | OUTPATIENT
Start: 2017-03-08 | End: 2017-03-08 | Stop reason: ALTCHOICE

## 2017-03-08 RX ORDER — ENOXAPARIN SODIUM 100 MG/ML
40 INJECTION SUBCUTANEOUS EVERY 24 HOURS
Status: DISCONTINUED | OUTPATIENT
Start: 2017-03-08 | End: 2017-03-09 | Stop reason: HOSPADM

## 2017-03-08 RX ADMIN — INSULIN GLARGINE 46 UNITS: 100 INJECTION, SOLUTION SUBCUTANEOUS at 10:03

## 2017-03-08 RX ADMIN — ONDANSETRON HYDROCHLORIDE 4 MG: 2 INJECTION, SOLUTION INTRAMUSCULAR; INTRAVENOUS at 07:39

## 2017-03-08 RX ADMIN — POTASSIUM PHOSPHATE, MONOBASIC AND POTASSIUM PHOSPHATE, DIBASIC: 224; 236 INJECTION, SOLUTION INTRAVENOUS at 10:38

## 2017-03-08 RX ADMIN — Medication 10 ML: at 14:50

## 2017-03-08 RX ADMIN — Medication 10 ML: at 05:17

## 2017-03-08 RX ADMIN — DEXTROSE MONOHYDRATE, SODIUM CHLORIDE, AND POTASSIUM CHLORIDE: 50; 4.5; 2.98 INJECTION, SOLUTION INTRAVENOUS at 05:36

## 2017-03-08 RX ADMIN — MORPHINE SULFATE 2 MG: 10 INJECTION INTRAMUSCULAR; INTRAVENOUS; SUBCUTANEOUS at 07:38

## 2017-03-08 RX ADMIN — INSULIN LISPRO 2 UNITS: 100 INJECTION, SOLUTION INTRAVENOUS; SUBCUTANEOUS at 12:00

## 2017-03-08 RX ADMIN — HEPARIN SODIUM 5000 UNITS: 5000 INJECTION, SOLUTION INTRAVENOUS; SUBCUTANEOUS at 05:16

## 2017-03-08 RX ADMIN — MUPIROCIN: 20 OINTMENT TOPICAL at 08:42

## 2017-03-08 RX ADMIN — MORPHINE SULFATE 2 MG: 10 INJECTION INTRAMUSCULAR; INTRAVENOUS; SUBCUTANEOUS at 21:44

## 2017-03-08 RX ADMIN — Medication 10 ML: at 21:44

## 2017-03-08 RX ADMIN — MORPHINE SULFATE 2 MG: 10 INJECTION INTRAMUSCULAR; INTRAVENOUS; SUBCUTANEOUS at 13:07

## 2017-03-08 RX ADMIN — DEXTROSE MONOHYDRATE 25 G: 25 INJECTION, SOLUTION INTRAVENOUS at 17:22

## 2017-03-08 RX ADMIN — MORPHINE SULFATE 2 MG: 10 INJECTION INTRAMUSCULAR; INTRAVENOUS; SUBCUTANEOUS at 17:28

## 2017-03-08 NOTE — PROGRESS NOTES
Hospitalist Progress Note    NAME: Enoch Marroquin   :  1980   MRN:  [id number]       Interim Hospital Summary: 31 y.o. male whom presented on 3/7/2017 with      Assessment / Plan:  1. Diabetic ketoacidosis. ,2nd to non-compliance  resolved          Started on Lantus this morning, advance Diet, discontinue IVF           Anticipated discharge in am  2. Type 1 diabetes, uncontrolled. , better compliance that previous. He use to be admitted more frequently   3. Leukocytosis. , improved, stress related  4. Acute renal failure., Resolved, Dehydration     PLAN: I will admit the patient to step-down unit. I will start the patient   on insulin drip, IV fluids and DKA protocol. As of now, I will put the   patient n.p.o. and once the patient's anion gap is closed, we will start   him on a diet and subcu insulin. I will check his BMP every 4 hours,   fingersticks every 2 hours and put him on bed rest. Pt's corrected   sodium is close to 144, so will start 1/2 NS with potassium. I will check   his A1c in the morning and for DVT prophylaxis, I will start the patient on   heparin subcu. Lantus has been started this morning             Code status: FULL  Prophylaxis: Lovenox  Recommended Disposition: Discharge to home in am     Subjective:     Chief Complaint / Reason for Physician Visit  \" I run out of Lantus where I was staying. Discussed with RN events overnight. Review of Systems:  Symptom Y/N Comments  Symptom Y/N Comments   Fever/Chills    Chest Pain     Poor Appetite    Edema     Cough    Abdominal Pain     Sputum    Joint Pain     SOB/JOHNSTON    Pruritis/Rash     Nausea/vomit    Tolerating PT/OT     Diarrhea    Tolerating Diet     Constipation    Other       Could NOT obtain due to:      Objective:     VITALS:   Last 24hrs VS reviewed since prior progress note.  Most recent are:  Patient Vitals for the past 24 hrs:   Temp Pulse Resp BP SpO2   17 1259 98.4 °F (36.9 °C) 85 16 133/87 99 %   17 1213 - 91 14 117/70 99 %   03/08/17 0725 - - - - 100 %   03/08/17 0700 - 88 17 121/61 99 %   03/08/17 0500 - 98 19 122/61 97 %   03/08/17 0300 98.4 °F (36.9 °C) 99 21 125/62 97 %   03/08/17 0100 - 97 24 138/59 99 %   03/07/17 2300 97.5 °F (36.4 °C) (!) 116 26 132/54 98 %   03/07/17 2100 - (!) 106 17 118/51 98 %   03/07/17 2008 98.7 °F (37.1 °C) (!) 111 16 137/67 98 %   03/07/17 1900 - (!) 119 21 109/63 99 %       Intake/Output Summary (Last 24 hours) at 03/08/17 1811  Last data filed at 03/08/17 1236   Gross per 24 hour   Intake           2469.5 ml   Output                0 ml   Net           2469.5 ml        PHYSICAL EXAM:  General: WD, WN. Alert, cooperative, no acute distress    EENT:  EOMI. Anicteric sclerae. MMM  Resp:  CTA bilaterally, no wheezing or rales. No accessory muscle use  CV:  Regular  rhythm,  No edema  GI:  Soft, Non distended, Non tender.  +Bowel sounds  Neurologic:  Alert and oriented X 3, normal speech,   Psych:   Good insight. Not anxious nor agitated  Skin:  No rashes. No jaundice    Reviewed most current lab test results and cultures  YES  Reviewed most current radiology test results   YES  Review and summation of old records today    NO  Reviewed patient's current orders and MAR    YES  PMH/ reviewed - no change compared to H&P  ________________________________________________________________________  Care Plan discussed with:    Comments   Patient     Family      RN     Care Manager     Consultant                        Multidiciplinary team rounds were held today with , nursing, pharmacist and clinical coordinator. Patient's plan of care was discussed; medications were reviewed and discharge planning was addressed.      ________________________________________________________________________  Total NON critical care TIME:  25  Minutes    Total CRITICAL CARE TIME Spent:   Minutes non procedure based      Comments   >50% of visit spent in counseling and coordination of care Y reveiwed Chart   ________________________________________________________________________  Radha Granda MD     Procedures: see electronic medical records for all procedures/Xrays and details which were not copied into this note but were reviewed prior to creation of Plan. LABS:  I reviewed today's most current labs and imaging studies. Pertinent labs include:  Recent Labs      03/08/17   0420 03/07/17   0902   WBC  15.7*  23.1*   HGB  12.3  16.6   HCT  35.1*  46.5   PLT  287  302     Recent Labs      03/08/17   0420  03/07/17   2300  03/07/17   1815   03/07/17   0902   NA  143  144  144   < >  134*   K  3.8  4.1  4.7   < >  4.7   CL  111*  114*  112*   < >  89*   CO2  23  23  20*   < >  9*   GLU  130*  147*  236*   < >  660*   BUN  28*  31*  34*   < >  38*   CREA  1.01  1.19  1.36*   < >  1.83*   CA  8.0*  7.9*  7.8*   < >  9.2   MG  2.2   --   2.3   --   3.0*   PHOS  1.8*   --   1.4*   --   8.0*   ALB   --    --    --    --   4.6   TBILI   --    --    --    --   0.7   SGOT   --    --    --    --   30   ALT   --    --    --    --   80*    < > = values in this interval not displayed.        Signed: Radha Granda MD

## 2017-03-08 NOTE — PROGRESS NOTES
3/8/2017    INTENSIVIST PROGRESS NOTE:     Patient seen and evaluated. Admitted today with DKA. Reports he ran out of his Lantus (46 units daily) yesterday. He reports he has been using his sliding scale (regular insulin), however. He is very thirsty and has some nausea.     No acute events overnight  Today in no distress, no acute complaints    Visit Vitals    /61    Pulse 98    Temp 98.4 °F (36.9 °C)    Resp 19    Ht 5' 8\" (1.727 m)    Wt 60.8 kg (134 lb)    SpO2 100%    BMI 20.37 kg/m2     GEN: NAD  CV: RRR  Lungs: CTA B    Labs reviewed  chest X-ray reviewed    A/P:  - DKA - insulin drip per DKA protocol, IV fluids - gap closed, transition to lantus ans SSI  - acute renal failure - IV fluids, renal fx normalized  - hyponatremia - isotonic fluids, Na WNL now  - advance diet  - DVT prophylaxis  - transfer to floor today     Jackson Garay MD

## 2017-03-08 NOTE — PROGRESS NOTES
0700  Shift report received from [facility] RN. Patient assessment completed and charted. Pt drowsy but oriented, pulses palpable, on room air, lungs coarse. 0730 Patient c/o pain, prn pain medication administered. 0800 Patient resting comfortably, no c/o pain or discomfort at current time. 1030 TRANSFER - OUT REPORT:  Verbal report given to Meryle Hasting, RN(name) on Aaron Wright  being transferred to Oncology(unit) for routine progression of care       Report consisted of patients Situation, Background, Assessment and   Recommendations(SBAR). Information from the following report(s) SBAR, Kardex, ED Summary, Procedure Summary, Intake/Output, MAR, Accordion, Recent Results, Med Rec Status and Cardiac Rhythm sinus was reviewed with the receiving nurse. Lines:   Peripheral IV 03/07/17 Left Antecubital (Active)   Site Assessment Clean, dry, & intact 3/8/2017  8:00 AM   Phlebitis Assessment 0 3/8/2017  8:00 AM   Infiltration Assessment 0 3/8/2017  8:00 AM   Dressing Status Clean, dry, & intact 3/8/2017  8:00 AM   Dressing Type Transparent;Tape 3/8/2017  8:00 AM   Hub Color/Line Status Blue; Infusing 3/8/2017  8:00 AM       Peripheral IV 03/07/17 Right Antecubital (Active)   Site Assessment Clean, dry, & intact 3/8/2017  8:00 AM   Phlebitis Assessment 0 3/8/2017  8:00 AM   Infiltration Assessment 0 3/8/2017  8:00 AM   Dressing Status Clean, dry, & intact 3/8/2017  8:00 AM   Dressing Type Transparent;Tape 3/8/2017  8:00 AM   Hub Color/Line Status Pink; Infusing 3/8/2017  8:00 AM        Opportunity for questions and clarification was provided.

## 2017-03-08 NOTE — PROGRESS NOTES
TRANSFER - IN REPORT:    Verbal report received from (name) on Josiephine Lesch  being received from CCU(unit) for routine progression of care      Report consisted of patients Situation, Background, Assessment and   Recommendations(SBAR). Information from the following report(s) SBAR, Kardex and MAR was reviewed with the receiving nurse. Opportunity for questions and clarification was provided. Assessment completed upon patients arrival to unit and care assumed. Primary Nurse Julio Fletcher and Eliza Newsome RN performed a dual skin assessment on this patient No impairment noted  Corey score is 20    1525 Pt blood sugar 50. Pt with dinner tray at bedside. Pt given d50 IV push. Will continue to monitor.

## 2017-03-08 NOTE — PROGRESS NOTES
Pharmacy Medication Reconciliation     The patient was interviewed regarding current PTA medication list, use and drug allergies The patient was questioned regarding use of any other inhalers, topical products, over the counter medications, herbal medications, vitamin products or ophthalmic/nasal/otic medication use. Allergy Update: nka    Recommendations/Findings: The following amendments were made to the patient's active medication list on file at [facility]:   1) Additions: none    2) Deletions: novolin     3) Changes: per patient he takes a sliding scale with Novolin R. Asked him why R and he didn't know why. Per patient he recently run-out of his Lantus. When asked as to where he gets his prescription, he couldn't tell me that either.       -Clarified PTA med list with patient. PTA medication list was corrected to the following:     Prior to Admission Medications   Prescriptions Last Dose Informant Patient Reported? Taking?   insulin glargine (LANTUS) 100 unit/mL injection   Yes No   Si Units by SubCUTAneous route daily. insulin regular (NOVOLIN R) 100 unit/mL injection   Yes Yes   Sig: by SubCUTAneous route as needed (>200 give 6 units, >300 give 8 units, >400 give 10 units).       Facility-Administered Medications: None          Thank you,  Traci Ewing, RIKID

## 2017-03-08 NOTE — PROGRESS NOTES
1900 Report received from Eulalio Panda RN.    1121 Shift summary-relatively uneventful shift. Insulin gtt off at 0302. No complaints of N/V. Frequently asking for something to drink. Appeared to rest well.    0700 Report given to Rip Alonso RN.

## 2017-03-08 NOTE — DIABETES MGMT
DTC Progress Note    Recommendations/ Comments: Pt discussed with rounding team and Dr. Doreatha Osler- plan to discontinue insulin drip- use home dose of Lantus 46 units (verified by pharmacy) and stop the gtt 2 hours later. Chart reviewed on Honey Prior during Multidisciplinary Rounds. A1c:   Lab Results   Component Value Date/Time    Hemoglobin A1c 14.6 03/08/2017 04:20 AM           Recent Glucose Results:   Lab Results   Component Value Date/Time     (H) 03/08/2017 04:20 AM     (H) 03/07/2017 11:00 PM     (H) 03/07/2017 06:15 PM    GLUCPOC 144 (H) 03/08/2017 08:41 AM    GLUCPOC 138 (H) 03/08/2017 07:31 AM    GLUCPOC 128 (H) 03/08/2017 06:21 AM        Lab Results   Component Value Date/Time    Creatinine 1.01 03/08/2017 04:20 AM       Active Orders   Diet    DIET CLEAR LIQUID        PO intake: No data found. Will continue to follow as needed. Thank you.         Tiffany Sharma, [address], [address] [place] [address]  Office:  [phone]

## 2017-03-09 VITALS
HEIGHT: 68 IN | SYSTOLIC BLOOD PRESSURE: 132 MMHG | HEART RATE: 81 BPM | DIASTOLIC BLOOD PRESSURE: 81 MMHG | BODY MASS INDEX: 20.31 KG/M2 | WEIGHT: 134 LBS | OXYGEN SATURATION: 99 % | RESPIRATION RATE: 17 BRPM | TEMPERATURE: 98 F

## 2017-03-09 LAB
ANION GAP BLD CALC-SCNC: 7 MMOL/L (ref 5–15)
BUN SERPL-MCNC: 12 MG/DL (ref 6–20)
BUN/CREAT SERPL: 15 (ref 12–20)
CALCIUM SERPL-MCNC: 8.2 MG/DL (ref 8.5–10.1)
CHLORIDE SERPL-SCNC: 103 MMOL/L (ref 97–108)
CO2 SERPL-SCNC: 29 MMOL/L (ref 21–32)
CREAT SERPL-MCNC: 0.82 MG/DL (ref 0.7–1.3)
GLUCOSE BLD STRIP.AUTO-MCNC: 164 MG/DL (ref 65–100)
GLUCOSE BLD STRIP.AUTO-MCNC: 205 MG/DL (ref 65–100)
GLUCOSE BLD STRIP.AUTO-MCNC: 50 MG/DL (ref 65–100)
GLUCOSE SERPL-MCNC: 175 MG/DL (ref 65–100)
POTASSIUM SERPL-SCNC: 3.9 MMOL/L (ref 3.5–5.1)
SERVICE CMNT-IMP: ABNORMAL
SODIUM SERPL-SCNC: 139 MMOL/L (ref 136–145)

## 2017-03-09 PROCEDURE — 80048 BASIC METABOLIC PNL TOTAL CA: CPT | Performed by: INTERNAL MEDICINE

## 2017-03-09 PROCEDURE — 36415 COLL VENOUS BLD VENIPUNCTURE: CPT | Performed by: INTERNAL MEDICINE

## 2017-03-09 PROCEDURE — 74011636637 HC RX REV CODE- 636/637: Performed by: INTERNAL MEDICINE

## 2017-03-09 PROCEDURE — 82962 GLUCOSE BLOOD TEST: CPT

## 2017-03-09 PROCEDURE — 74011000250 HC RX REV CODE- 250: Performed by: EMERGENCY MEDICINE

## 2017-03-09 PROCEDURE — 74011250636 HC RX REV CODE- 250/636: Performed by: EMERGENCY MEDICINE

## 2017-03-09 RX ORDER — INSULIN GLARGINE 100 [IU]/ML
46 INJECTION, SOLUTION SUBCUTANEOUS
Status: DISCONTINUED | OUTPATIENT
Start: 2017-03-09 | End: 2017-03-09 | Stop reason: HOSPADM

## 2017-03-09 RX ADMIN — MORPHINE SULFATE 2 MG: 10 INJECTION INTRAMUSCULAR; INTRAVENOUS; SUBCUTANEOUS at 07:21

## 2017-03-09 RX ADMIN — INSULIN LISPRO 3 UNITS: 100 INJECTION, SOLUTION INTRAVENOUS; SUBCUTANEOUS at 08:35

## 2017-03-09 RX ADMIN — DEXTROSE MONOHYDRATE 25 G: 25 INJECTION, SOLUTION INTRAVENOUS at 03:21

## 2017-03-09 RX ADMIN — INSULIN GLARGINE 46 UNITS: 100 INJECTION, SOLUTION SUBCUTANEOUS at 08:35

## 2017-03-09 RX ADMIN — Medication 10 ML: at 05:58

## 2017-03-09 RX ADMIN — MORPHINE SULFATE 2 MG: 10 INJECTION INTRAMUSCULAR; INTRAVENOUS; SUBCUTANEOUS at 03:21

## 2017-03-09 NOTE — DISCHARGE SUMMARY
Hospitalist Discharge Summary     Patient ID:  Dorian Khoury  [id number]  89 y.o.  1980    PCP on record: Stephanie Agosto MD    Admit date: 3/7/2017  Discharge date and time: 3/9/2017      DISCHARGE DIAGNOSIS:    1. Diabetic ketoacidosis. ,2nd to non-compliance resolved  2. Type 1 diabetes, uncontrolled. , better compliance that previous. He use to be admitted more frequently   3. Leukocytosis. , improved, stress related  4. Acute renal failure., Resolved, Dehydration   5. Positive Marijuana, hx of substance abuse               Recommended Disposition: Discharge to home in am      CONSULTATIONS:  IP CONSULT TO HOSPITALIST    Excerpted HPI from H&P of Tulio Smith MD:  The patient is a 65-year-old white   male with a past medical history as documented below. The patient   came to the hospital complaining of nausea, vomiting and abdominal   pain since yesterday. As per the patient, he started having all of those   symptoms yesterday, he checked his blood sugars this morning and   the reading was high, so he decided to come to the ER. The patient   also reported that he had a couple of episodes of diarrhea since   yesterday, but it has subsided now. He denies any other complaints   right now. Not talking that much and lethargic.    ______________________________________________________________________  DISCHARGE SUMMARY/HOSPITAL COURSE:  for full details see H&P, daily progress notes, labs, consult notes. 1. Diabetic ketoacidosis. ,2nd to non-compliance resolved    Started on Lantus this morning, advance Diet, discontinue IVF  Anticipated discharge in am  He was admitted to the patient to step-down unit. I will start the patient   on insulin drip, IV fluids and DKA protocol. As of now, I will put the   patient n.p.o. and once the patient's anion gap is closed, we will start   him on a diet and subcu insulin.  I will check his BMP every 4 hours,   fingersticks every 2 hours and put him on bed rest. Pt's corrected   sodium is close to 144, so will start 1/2 NS with potassium. I will check   his A1c in the morning and for DVT prophylaxis, I will start the patient on   heparin subcu. He States that because he works as a , he sometimes sleep at his [employer]. He had run out of Lantus in his work site [employer] still had some at home. He started N% V at work. H  2. Type 1 diabetes, uncontrolled. , better compliance than previous. He use to be admitted more frequently        He has been going to [facility] where he gets his Lantus for Free. He has been admitted to [facility]        With less DKA episodes since he has had a reliable  Source of his lantus. I will continue him on his usual dose    3. Leukocytosis. , improved, stress related  4. Acute renal failure., Resolved, Dehydration  5. Marijuana on board         See above . He has a history of substance abuse and he states that now he only uses marijuana occasionally  e also has Marijuana on board and discussed with him that Marijuana can  Cause N& V and exacerbate his   DKA episodes.         PLAN: I will admit the patient to step-down unit. I will start the patient   on insulin drip, IV fluids and DKA protocol. As of now, I will put the   patient n.p.o. and once the patient's anion gap is closed, we will start   him on a diet and subcu insulin. I will check his BMP every 4 hours,   fingersticks every 2 hours and put him on bed rest. Pt's corrected   sodium is close to 144, so will start 1/2 NS with potassium. I will check   his A1c in the morning and for DVT prophylaxis, I will start the patient on   heparin subcu. Lantus has been started this morning                  Code status: FULL  Prophylaxis: Lovenox  Recommended Disposition: Discharge to home in am          _______________________________________________________________________  Patient seen and examined by me on discharge day.   Pertinent Findings:  Gen:    Not in distress  Chest: Clear lungs  CVS:   Regular rhythm. No edema  Abd:  Soft, not distended, not tender  Neuro:  Alert,Ox3  _______________________________________________________________________  DISCHARGE MEDICATIONS:   Current Discharge Medication List      CONTINUE these medications which have NOT CHANGED    Details   insulin regular (NOVOLIN R) 100 unit/mL injection by SubCUTAneous route as needed (>200 give 6 units, >300 give 8 units, >400 give 10 units). insulin glargine (LANTUS) 100 unit/mL injection 46 Units by SubCUTAneous route daily. My Recommended Diet, Activity, Wound Care, and follow-up labs are listed in the patient's Discharge Insturctions which I have personally completed and reviewed.     _______________________________________________________________________  DISPOSITION:    Home with Family: y   Home with HH/PT/OT/RN:    AMANDA/LTC:    CATHIE:    OTHER:        Condition at Discharge:  Stable  _______________________________________________________________________  Follow up with:   PCP : Stephanie Agosto MD  Follow-up Information     Follow up With Details Comments Contact Info     Schedule an appointment as soon as possible for a visit in 2 weeks  Patient follows up in THE [facility] for years and receives the Lantus Vials from them              Total time in minutes spent coordinating this discharge (includes going over instructions, follow-up, prescriptions, and preparing report for sign off to her PCP) :  <30 minutes    Signed:  Madeleine Hooper MD

## 2017-03-09 NOTE — PROGRESS NOTES
Oncology Nursing Communication Tool      6:56 AM  3/9/2017     Bedside and Verbal shift change report given to Kinsey Matthews, RN (incoming nurse) by Norris Silva RN (outgoing nurse) on Sulma Katz a 31 y.o. male who was admitted on 3/7/2017  8:24 AM. Report included the following information SBAR, Kardex, ED Summary, Procedure Summary, Intake/Output, MAR, Accordion, Recent Results and Med Rec Status. Significant changes during shift: one episode of hypoglycemia overnight      Issues for physician to address: see above, review labs            Code Status: Full Code     Infections: No current active infections     Allergies: Review of patient's allergies indicates no known allergies.      Current diet: DIET DIABETIC WITH OPTIONS Consistent Carb 2200kcal; Regular       Pain Controlled [] yes [] no   Bowel Movement [] yes [] no   Last Bowel Movement (date)                 Vital Signs:   Patient Vitals for the past 12 hrs:   Temp Pulse Resp BP SpO2   03/09/17 0636 98 °F (36.7 °C) 81 17 132/81 99 %   03/08/17 2125 98.3 °F (36.8 °C) 82 17 125/74 97 %      Intake & Output:     Intake/Output Summary (Last 24 hours) at 03/09/17 0656  Last data filed at 03/09/17 0155   Gross per 24 hour   Intake              840 ml   Output              400 ml   Net              440 ml      Laboratory Results:     Recent Results (from the past 12 hour(s))   GLUCOSE, POC    Collection Time: 03/08/17  9:28 PM   Result Value Ref Range    Glucose (POC) 147 (H) 65 - 100 mg/dL    Performed by Marion Rios    GLUCOSE, POC    Collection Time: 03/09/17  3:17 AM   Result Value Ref Range    Glucose (POC) 50 (L) 65 - 100 mg/dL    Performed by Chaz [address], POC    Collection Time: 03/09/17  3:57 AM   Result Value Ref Range    Glucose (POC) 164 (H) 65 - 100 mg/dL    Performed by Katy Jigar     METABOLIC PANEL, BASIC    Collection Time: 03/09/17  4:17 AM   Result Value Ref Range    Sodium 139 136 - 145 mmol/L Potassium 3.9 3.5 - 5.1 mmol/L    Chloride 103 97 - 108 mmol/L    CO2 29 21 - 32 mmol/L    Anion gap 7 5 - 15 mmol/L    Glucose 175 (H) 65 - 100 mg/dL    BUN 12 6 - 20 MG/DL    Creatinine 0.82 0.70 - 1.30 MG/DL    BUN/Creatinine ratio 15 12 - 20      GFR est AA >60 >60 ml/min/1.73m2    GFR est non-AA >60 >60 ml/min/1.73m2    Calcium 8.2 (L) 8.5 - 10.1 MG/DL              Opportunity for questions and clarifications were given to the incoming nurse. Patient's bed is in low position, side rails x2, door open PRN, call bell within reach and patient not in distress.       Alessandro Bazzi RN

## 2017-03-09 NOTE — PROGRESS NOTES
March 9, 2017      Re :  Quincy Liang      To Whom this May Concern: This is to let you know that Mr. Yogi Katz presented to [facility] Emergency room the morning of March 7, 2017    And was admitted to the hospital until March 9, 2017. He may return to work on Monday March 13, 2017. Any consideration given him on his absence    Will be greatly appreciated.           Sincerely,             Surendra Grissom MD

## 2017-03-09 NOTE — PROGRESS NOTES
0320 Patient states \"I think my sugar is low\".   ACu check showing 50.  25g of dextrose given  0350 Acu check 157 now

## 2017-03-09 NOTE — PROGRESS NOTES
Report received from Marifer Vergara, [place]. SBAR, Kardex, MAR and Med Rec Status were discussed. Milagros Ya, RN     8:45 AM reviewed d/c instructions with pt. Pt states he has medications at home and will follow up at [facility]. Patient verbalized understanding of diabetic diet, insulin administration and glucose monitoring. All belongings packed by patient. Awaiting pt's father for transportation.

## 2017-03-09 NOTE — DISCHARGE INSTRUCTIONS
HOSPITALIST DISCHARGE INSTRUCTIONS    NAME: Arjun Morales   :  1980   MRN:  [id number]     Date/Time:  3/9/2017 7:45 AM    ADMIT DATE: 3/7/2017   DISCHARGE DATE: 3/9/2017         · It is important that you take the medication exactly as they are prescribed. · Keep your medication in the bottles provided by the pharmacist and keep a list of the medication names, dosages, and times to be taken in your wallet. · Do not take other medications without consulting your doctor. What to do at [address]:  Diabetic Diet    Recommended activity: Activity as tolerated      If you have questions regarding the hospital related prescriptions or hospital related issues please call [facility] at . You can always direct your questions to your primary care doctor if you are unable to reach your hospital physician; your PCP works as an extension of your hospital doctor just like your hospital doctor is an extension of your PCP for your time at the hospital [facility])    If you experience any of the following symptoms then please call your primary care physician or return to the emergency room if you cannot get hold of your doctor:    Fever, chills, nausea, vomiting, or persistent diarrhea  Worsening weakness or new problems with your speech or balance  Dark stools or visible blood in your stools  New Leg swelling or shortness of breath as this could be signs of a clot    Additional Instructions:      Bring these papers with you to your follow up appointments. The papers will help your doctors be sure to continue the care plan from the hospital.              Information obtained by :  I understand that if any problems occur once I am at home I am to contact my physician. I understand and acknowledge receipt of the instructions indicated above. Physician's or R.N.'s Signature                                                                  Date/Time                                                                                                                                              Patient or Representative Signature

## 2017-03-22 ENCOUNTER — HOSPITAL ENCOUNTER (OUTPATIENT)
Dept: LAB | Age: 37
Discharge: HOME | End: 2017-03-22

## 2017-03-22 LAB
ERYTHROCYTE [DISTWIDTH] IN BLOOD BY AUTOMATED COUNT: 13.6 % (ref 11.5–14.5)
HCT VFR BLD AUTO: 43.3 % (ref 36.6–50.3)
HGB BLD-MCNC: 15.3 G/DL (ref 12.1–17)
MCH RBC QN AUTO: 32.9 PG (ref 26–34)
MCHC RBC AUTO-ENTMCNC: 35.3 G/DL (ref 30–36.5)
MCV RBC AUTO: 93.1 FL (ref 80–99)
PLATELET # BLD AUTO: 404 K/UL (ref 150–400)
RBC # BLD AUTO: 4.65 M/UL (ref 4.1–5.7)
WBC # BLD AUTO: 6.5 K/UL (ref 4.1–11.1)

## 2017-03-22 PROCEDURE — 85027 COMPLETE CBC AUTOMATED: CPT | Performed by: FAMILY MEDICINE

## 2017-03-22 PROCEDURE — 83036 HEMOGLOBIN GLYCOSYLATED A1C: CPT | Performed by: FAMILY MEDICINE

## 2017-03-22 PROCEDURE — 84443 ASSAY THYROID STIM HORMONE: CPT | Performed by: FAMILY MEDICINE

## 2017-03-22 PROCEDURE — 80053 COMPREHEN METABOLIC PANEL: CPT | Performed by: FAMILY MEDICINE

## 2017-03-22 PROCEDURE — 80061 LIPID PANEL: CPT | Performed by: FAMILY MEDICINE

## 2017-03-23 LAB
ALBUMIN SERPL BCP-MCNC: 4.7 G/DL (ref 3.5–5)
ALBUMIN/GLOB SERPL: 1.3 {RATIO} (ref 1.1–2.2)
ALP SERPL-CCNC: 185 U/L (ref 45–117)
ALT SERPL-CCNC: 56 U/L (ref 12–78)
ANION GAP BLD CALC-SCNC: 8 MMOL/L (ref 5–15)
AST SERPL W P-5'-P-CCNC: 23 U/L (ref 15–37)
BILIRUB SERPL-MCNC: 0.5 MG/DL (ref 0.2–1)
BUN SERPL-MCNC: 19 MG/DL (ref 6–20)
BUN/CREAT SERPL: 23 (ref 12–20)
CALCIUM SERPL-MCNC: 9.7 MG/DL (ref 8.5–10.1)
CHLORIDE SERPL-SCNC: 95 MMOL/L (ref 97–108)
CHOLEST SERPL-MCNC: 222 MG/DL
CO2 SERPL-SCNC: 32 MMOL/L (ref 21–32)
CREAT SERPL-MCNC: 0.81 MG/DL (ref 0.7–1.3)
EST. AVERAGE GLUCOSE BLD GHB EST-MCNC: 358 MG/DL
GLOBULIN SER CALC-MCNC: 3.6 G/DL (ref 2–4)
GLUCOSE SERPL-MCNC: 95 MG/DL (ref 65–100)
HBA1C MFR BLD: 14.1 % (ref 4.2–6.3)
HDLC SERPL-MCNC: 80 MG/DL
HDLC SERPL: 2.8 {RATIO} (ref 0–5)
LDLC SERPL CALC-MCNC: 110.4 MG/DL (ref 0–100)
LIPID PROFILE,FLP: ABNORMAL
POTASSIUM SERPL-SCNC: 3.7 MMOL/L (ref 3.5–5.1)
PROT SERPL-MCNC: 8.3 G/DL (ref 6.4–8.2)
SODIUM SERPL-SCNC: 135 MMOL/L (ref 136–145)
TRIGL SERPL-MCNC: 158 MG/DL (ref ?–150)
TSH SERPL DL<=0.05 MIU/L-ACNC: 2.04 UIU/ML (ref 0.36–3.74)
VLDLC SERPL CALC-MCNC: 31.6 MG/DL

## 2017-08-23 ENCOUNTER — APPOINTMENT (OUTPATIENT)
Dept: GENERAL RADIOLOGY | Age: 37
End: 2017-08-23
Attending: STUDENT IN AN ORGANIZED HEALTH CARE EDUCATION/TRAINING PROGRAM
Payer: SELF-PAY

## 2017-08-23 ENCOUNTER — HOSPITAL ENCOUNTER (EMERGENCY)
Age: 37
Discharge: HOME | End: 2017-08-23
Attending: STUDENT IN AN ORGANIZED HEALTH CARE EDUCATION/TRAINING PROGRAM
Payer: SELF-PAY

## 2017-08-23 VITALS
DIASTOLIC BLOOD PRESSURE: 72 MMHG | TEMPERATURE: 98 F | RESPIRATION RATE: 18 BRPM | SYSTOLIC BLOOD PRESSURE: 109 MMHG | BODY MASS INDEX: 20.38 KG/M2 | OXYGEN SATURATION: 96 % | HEIGHT: 68 IN | HEART RATE: 80 BPM | WEIGHT: 134.5 LBS

## 2017-08-23 DIAGNOSIS — R73.9 HYPERGLYCEMIA: Primary | ICD-10-CM

## 2017-08-23 LAB
ALBUMIN SERPL-MCNC: 4 G/DL (ref 3.5–5)
ALBUMIN/GLOB SERPL: 1.2 {RATIO} (ref 1.1–2.2)
ALP SERPL-CCNC: 169 U/L (ref 45–117)
ALT SERPL-CCNC: 80 U/L (ref 12–78)
ANION GAP SERPL CALC-SCNC: 9 MMOL/L (ref 5–15)
APPEARANCE UR: CLEAR
ARTERIAL PATENCY WRIST A: YES
AST SERPL-CCNC: 29 U/L (ref 15–37)
BACTERIA URNS QL MICRO: NEGATIVE /HPF
BASE DEFICIT BLD-SCNC: 1 MMOL/L
BASOPHILS # BLD: 0 K/UL (ref 0–0.1)
BASOPHILS NFR BLD: 0 % (ref 0–1)
BDY SITE: NORMAL
BILIRUB SERPL-MCNC: 0.6 MG/DL (ref 0.2–1)
BILIRUB UR QL: NEGATIVE
BUN SERPL-MCNC: 21 MG/DL (ref 6–20)
BUN/CREAT SERPL: 24 (ref 12–20)
CALCIUM SERPL-MCNC: 9.8 MG/DL (ref 8.5–10.1)
CHLORIDE SERPL-SCNC: 94 MMOL/L (ref 97–108)
CO2 SERPL-SCNC: 30 MMOL/L (ref 21–32)
COLOR UR: ABNORMAL
CREAT SERPL-MCNC: 0.88 MG/DL (ref 0.7–1.3)
EOSINOPHIL # BLD: 0.1 K/UL (ref 0–0.4)
EOSINOPHIL NFR BLD: 2 % (ref 0–7)
EPITH CASTS URNS QL MICRO: ABNORMAL /LPF
ERYTHROCYTE [DISTWIDTH] IN BLOOD BY AUTOMATED COUNT: 12.9 % (ref 11.5–14.5)
GAS FLOW.O2 O2 DELIVERY SYS: NORMAL L/MIN
GLOBULIN SER CALC-MCNC: 3.4 G/DL (ref 2–4)
GLUCOSE BLD STRIP.AUTO-MCNC: 180 MG/DL (ref 65–100)
GLUCOSE SERPL-MCNC: 377 MG/DL (ref 65–100)
GLUCOSE UR STRIP.AUTO-MCNC: >1000 MG/DL
HCO3 BLD-SCNC: 24.2 MMOL/L (ref 22–26)
HCT VFR BLD AUTO: 42.7 % (ref 36.6–50.3)
HGB BLD-MCNC: 15.1 G/DL (ref 12.1–17)
HGB UR QL STRIP: ABNORMAL
KETONES UR QL STRIP.AUTO: 40 MG/DL
LEUKOCYTE ESTERASE UR QL STRIP.AUTO: NEGATIVE
LYMPHOCYTES # BLD: 2.2 K/UL (ref 0.8–3.5)
LYMPHOCYTES NFR BLD: 34 % (ref 12–49)
MCH RBC QN AUTO: 32 PG (ref 26–34)
MCHC RBC AUTO-ENTMCNC: 35.4 G/DL (ref 30–36.5)
MCV RBC AUTO: 90.5 FL (ref 80–99)
MONOCYTES # BLD: 0.5 K/UL (ref 0–1)
MONOCYTES NFR BLD: 7 % (ref 5–13)
NEUTS SEG # BLD: 3.7 K/UL (ref 1.8–8)
NEUTS SEG NFR BLD: 57 % (ref 32–75)
NITRITE UR QL STRIP.AUTO: NEGATIVE
PCO2 BLD: 41.7 MMHG (ref 35–45)
PH BLD: 7.37 [PH] (ref 7.35–7.45)
PH UR STRIP: 5 [PH] (ref 5–8)
PLATELET # BLD AUTO: 273 K/UL (ref 150–400)
PO2 BLD: 95 MMHG (ref 80–100)
POTASSIUM SERPL-SCNC: 3.9 MMOL/L (ref 3.5–5.1)
PROT SERPL-MCNC: 7.4 G/DL (ref 6.4–8.2)
PROT UR STRIP-MCNC: NEGATIVE MG/DL
RBC # BLD AUTO: 4.72 M/UL (ref 4.1–5.7)
RBC #/AREA URNS HPF: ABNORMAL /HPF (ref 0–5)
SAO2 % BLD: 97 % (ref 92–97)
SERVICE CMNT-IMP: ABNORMAL
SODIUM SERPL-SCNC: 133 MMOL/L (ref 136–145)
SP GR UR REFRACTOMETRY: 1.01 (ref 1–1.03)
SPECIMEN TYPE: NORMAL
UROBILINOGEN UR QL STRIP.AUTO: 0.2 EU/DL (ref 0.2–1)
WBC # BLD AUTO: 6.5 K/UL (ref 4.1–11.1)
WBC URNS QL MICRO: ABNORMAL /HPF (ref 0–4)

## 2017-08-23 PROCEDURE — 36415 COLL VENOUS BLD VENIPUNCTURE: CPT | Performed by: EMERGENCY MEDICINE

## 2017-08-23 PROCEDURE — 82803 BLOOD GASES ANY COMBINATION: CPT

## 2017-08-23 PROCEDURE — 85025 COMPLETE CBC W/AUTO DIFF WBC: CPT | Performed by: EMERGENCY MEDICINE

## 2017-08-23 PROCEDURE — 74011250636 HC RX REV CODE- 250/636: Performed by: STUDENT IN AN ORGANIZED HEALTH CARE EDUCATION/TRAINING PROGRAM

## 2017-08-23 PROCEDURE — 74011636637 HC RX REV CODE- 636/637: Performed by: STUDENT IN AN ORGANIZED HEALTH CARE EDUCATION/TRAINING PROGRAM

## 2017-08-23 PROCEDURE — 82962 GLUCOSE BLOOD TEST: CPT

## 2017-08-23 PROCEDURE — 96360 HYDRATION IV INFUSION INIT: CPT

## 2017-08-23 PROCEDURE — 99284 EMERGENCY DEPT VISIT MOD MDM: CPT

## 2017-08-23 PROCEDURE — 80053 COMPREHEN METABOLIC PANEL: CPT | Performed by: EMERGENCY MEDICINE

## 2017-08-23 PROCEDURE — 81001 URINALYSIS AUTO W/SCOPE: CPT | Performed by: EMERGENCY MEDICINE

## 2017-08-23 PROCEDURE — 73502 X-RAY EXAM HIP UNI 2-3 VIEWS: CPT

## 2017-08-23 PROCEDURE — 36600 WITHDRAWAL OF ARTERIAL BLOOD: CPT

## 2017-08-23 RX ORDER — INSULIN LISPRO 100 [IU]/ML
8 INJECTION, SOLUTION INTRAVENOUS; SUBCUTANEOUS ONCE
Status: COMPLETED | OUTPATIENT
Start: 2017-08-23 | End: 2017-08-23

## 2017-08-23 RX ADMIN — SODIUM CHLORIDE 1000 ML: 900 INJECTION, SOLUTION INTRAVENOUS at 13:46

## 2017-08-23 RX ADMIN — INSULIN LISPRO 8 UNITS: 100 INJECTION, SOLUTION INTRAVENOUS; SUBCUTANEOUS at 13:46

## 2017-08-23 NOTE — ED NOTES
Pt given discharge instructions, patient education, and follow up information. Pt states understanding. All questions answered. Pt discharged to home in private vehicle with ride, ambulatory. Pt A/Ox4, RA, pain controlled.

## 2017-08-23 NOTE — DISCHARGE INSTRUCTIONS
Learning About High Blood Sugar  What is high blood sugar? Your body turns the food you eat into glucose (sugar), which it uses for energy. But if your body isn't able to use the sugar right away, it can build up in your blood and lead to high blood sugar. When the amount of sugar in your blood stays too high for too much of the time, you may have diabetes. Diabetes is a disease that can cause serious health problems. The good news is that lifestyle changes may help you get your blood sugar back to normal and avoid or delay diabetes. What causes high blood sugar? Sugar (glucose) can build up in your blood if you:  · Are overweight. · Have a family history of diabetes. · Take certain medicines, such as steroids. What are the symptoms? Having high blood sugar may not cause any symptoms at all. Or it may make you feel very thirsty or very hungry. You may also urinate more often than usual, have blurry vision, or lose weight without trying. How is high blood sugar treated? You can take steps to lower your blood sugar level if you understand what makes it get higher. Your doctor may want you to learn how to test your blood sugar level at home. Then you can see how illness, stress, or different kinds of food or medicine raise or lower your blood sugar level. Other tests may be needed to see if you have diabetes. How can you prevent high blood sugar? · Watch your weight. If you're overweight, losing just a small amount of weight may help. Reducing fat around your waist is most important. · Limit the amount of calories, sweets, and unhealthy fat you eat. Ask your doctor if a dietitian can help you. A registered dietitian can help you create meal plans that fit your lifestyle. · Get at least 30 minutes of exercise on most days of the week. Exercise helps control your blood sugar. It also helps you maintain a healthy weight. Walking is a good choice.  You also may want to do other activities, such as running, swimming, cycling, or playing tennis or team sports. · If your doctor prescribed medicines, take them exactly as prescribed. Call your doctor if you think you are having a problem with your medicine. You will get more details on the specific medicines your doctor prescribes. Follow-up care is a key part of your treatment and safety. Be sure to make and go to all appointments, and call your doctor if you are having problems. It's also a good idea to know your test results and keep a list of the medicines you take. Where can you learn more? Go to http://[employer].info/. Enter O108 in the search box to learn more about \"Learning About High Blood Sugar. \"  Current as of: March 13, 2017  Content Version: 11.3  © 2040-5711 [employer], "[employer]". Care instructions adapted under license by [employer] (which disclaims liability or warranty for this information). If you have questions about a medical condition or this instruction, always ask your healthcare professional. [place] [address] any warranty or liability for your use of this information. We hope that we have addressed all of your medical concerns. The examination and treatment you received in the Emergency Department were for an emergent problem and were not intended as complete care. It is important that you follow up with your healthcare provider(s) for ongoing care. If your symptoms worsen or do not improve as expected, and you are unable to reach your usual health care provider(s), you should return to the Emergency Department. Today's healthcare is undergoing tremendous change, and patient satisfaction surveys are one of the many tools to assess the quality of medical care. You may receive a survey from the [employer] organization regarding your experience in the Emergency Department.   I hope that your experience has been completely positive, particularly the medical care that I provided. As such, please participate in the survey; anything less than excellent does not meet my expectations or intentions. [facility] and [facility] participate in nationally recognized quality of care measures. If your blood pressure is greater than 120/80, as reported below, we urge that you seek medical care to address the potential of high blood pressure, commonly known as hypertension. Hypertension can be hereditary or can be caused by certain medical conditions, pain, stress, or \"white coat syndrome. \"       Please make an appointment with your health care provider(s) for follow up of your Emergency Department visit. VITALS:   Patient Vitals for the past 8 hrs:   Temp Pulse Resp BP SpO2   08/23/17 1400 - 77 18 113/81 97 %   08/23/17 1330 - 79 17 103/70 96 %   08/23/17 1300 - 83 14 106/76 96 %   08/23/17 1243 - - - - 97 %   08/23/17 1201 97.9 °F (36.6 °C) 93 16 110/69 96 %          Thank you for allowing us to provide you with medical care today. We realize that you have many choices for your emergency care needs. Please choose us in the future for any continued health care needs. Irina Frost [place], [address] [place] [address] 20.   Office: [phone]            Recent Results (from the past 24 hour(s))   CBC WITH AUTOMATED DIFF    Collection Time: 08/23/17 12:26 PM   Result Value Ref Range    WBC 6.5 4.1 - 11.1 K/uL    RBC 4.72 4.10 - 5.70 M/uL    HGB 15.1 12.1 - 17.0 g/dL    HCT 42.7 36.6 - 50.3 %    MCV 90.5 80.0 - 99.0 FL    MCH 32.0 26.0 - 34.0 PG    MCHC 35.4 30.0 - 36.5 g/dL    RDW 12.9 11.5 - 14.5 %    PLATELET 397 792 - 843 K/uL    NEUTROPHILS 57 32 - 75 %    LYMPHOCYTES 34 12 - 49 %    MONOCYTES 7 5 - 13 %    EOSINOPHILS 2 0 - 7 %    BASOPHILS 0 0 - 1 %    ABS. NEUTROPHILS 3.7 1.8 - 8.0 K/UL    ABS. LYMPHOCYTES 2.2 0.8 - 3.5 K/UL    ABS. MONOCYTES 0.5 0.0 - 1.0 K/UL    ABS.  EOSINOPHILS 0.1 0.0 - 0.4 K/UL ABS. BASOPHILS 0.0 0.0 - 0.1 K/UL   METABOLIC PANEL, COMPREHENSIVE    Collection Time: 08/23/17 12:26 PM   Result Value Ref Range    Sodium 133 (L) 136 - 145 mmol/L    Potassium 3.9 3.5 - 5.1 mmol/L    Chloride 94 (L) 97 - 108 mmol/L    CO2 30 21 - 32 mmol/L    Anion gap 9 5 - 15 mmol/L    Glucose 377 (H) 65 - 100 mg/dL    BUN 21 (H) 6 - 20 MG/DL    Creatinine 0.88 0.70 - 1.30 MG/DL    BUN/Creatinine ratio 24 (H) 12 - 20      GFR est AA >60 >60 ml/min/1.73m2    GFR est non-AA >60 >60 ml/min/1.73m2    Calcium 9.8 8.5 - 10.1 MG/DL    Bilirubin, total 0.6 0.2 - 1.0 MG/DL    ALT (SGPT) 80 (H) 12 - 78 U/L    AST (SGOT) 29 15 - 37 U/L    Alk.  phosphatase 169 (H) 45 - 117 U/L    Protein, total 7.4 6.4 - 8.2 g/dL    Albumin 4.0 3.5 - 5.0 g/dL    Globulin 3.4 2.0 - 4.0 g/dL    A-G Ratio 1.2 1.1 - 2.2     URINALYSIS W/MICROSCOPIC    Collection Time: 08/23/17 12:26 PM   Result Value Ref Range    Color YELLOW/STRAW      Appearance CLEAR CLEAR      Specific gravity 1.015 1.003 - 1.030      pH (UA) 5.0 5.0 - 8.0      Protein NEGATIVE  NEG mg/dL    Glucose >1000 (A) NEG mg/dL    Ketone 40 (A) NEG mg/dL    Bilirubin NEGATIVE  NEG      Blood SMALL (A) NEG      Urobilinogen 0.2 0.2 - 1.0 EU/dL    Nitrites NEGATIVE  NEG      Leukocyte Esterase NEGATIVE  NEG      WBC 0-4 0 - 4 /hpf    RBC 0-5 0 - 5 /hpf    Epithelial cells FEW FEW /lpf    Bacteria NEGATIVE  NEG /hpf   POC G3 - PUL    Collection Time: 08/23/17  2:19 PM   Result Value Ref Range    pH (POC) 7.372 7.35 - 7.45      pCO2 (POC) 41.7 35.0 - 45.0 MMHG    pO2 (POC) 95 80 - 100 MMHG    HCO3 (POC) 24.2 22 - 26 MMOL/L    sO2 (POC) 97 92 - 97 %    Base deficit (POC) 1 mmol/L    Site LEFT RADIAL      Device: ROOM AIR      Allens test (POC) YES      Specimen type (POC) ARTERIAL     GLUCOSE, POC    Collection Time: 08/23/17  2:45 PM   Result Value Ref Range    Glucose (POC) 180 (H) 65 - 100 mg/dL    Performed by Landry Kayser        Xr Hip Rt W Or Wo Pelv 2-3 Vws    Result Date: 8/23/2017  EXAM:  XR HIP RT W OR WO PELV 2-3 VWS INDICATION:   Right hip pain. COMPARISON: None. FINDINGS: An AP view of the pelvis and a frogleg lateral view of the right hip demonstrate no fracture, dislocation or other acute abnormality. IMPRESSION:  No acute abnormality.

## 2017-08-23 NOTE — ED PROVIDER NOTES
HPI Comments: 31 y.o. male with past medical history significant for insulin dependent diabetes who presents from home via private vehicle with chief complaint of generalized fatigue, nausea, and vomiting. Pt reports that for the past week he has had decreased energy and been sleeping more than usual.  He has had a few transient intermittent episodes of nausea and vomiting over the past week, but states the Sx have not been persistent. He takes 46 units of Lantis long acting each morning for his diabetes and then is on a sliding scale throughout the day, but he reports that his blood sugars are not well controlled. Pt reports that he used to go to the local free clinic, but he was recently released from their care second to a increase in his income. He has not found a new PCP yet. His blood sugar this morning when he checked it was in the 400's. Pt denies fever, chills, CP, SOB, abdominal pain. There are no other acute medical concerns at this time. Note written by med Ratliff, as dictated by Hernando Demarco MD 1:47 PM         The history is provided by the patient.         Past Medical History:   Diagnosis Date    Bipolar 1 disorder, depressed ([address]     Bipolar disorder ([address]     Depression     Diabetes ([address]     DKA, type 1 ([address] 1/27/2013    diagnosed age 23    H/O noncompliance with medical treatment, presenting hazards to health     MRSA (methicillin resistant staph aureus) culture positive     MRSA (methicillin resistant Staphylococcus aureus)     Face    Noncompliance with medication regimen     Second hand smoke exposure     Seizure ([address]     Seizures ([address] 2006 or 2007    one episode during senior living       Past Surgical History:   Procedure Laterality Date    HX HEENT      top left wisdom tooth    HX ORTHOPAEDIC Left     wrist; MCV         Family History:   Problem Relation Age of Onset    Diabetes Other      neice, type 1        Social History     Social History    Marital status: SINGLE     Spouse name: N/A    Number of children: N/A    Years of education: N/A     Occupational History    Not on file. Social History Main Topics    Smoking status: Current Every Day Smoker     Packs/day: 1.00     Years: 16.00     Types: Cigarettes    Smokeless tobacco: Never Used    Alcohol use No    Drug use: No    Sexual activity: Not on file     Other Topics Concern    Not on file     Social History Narrative    ** Merged History Encounter **              ALLERGIES: Review of patient's allergies indicates no known allergies. Review of Systems   Constitutional: Positive for fatigue. Negative for chills and fever. HENT: Negative for congestion, rhinorrhea, sneezing and sore throat. Eyes: Negative for redness and visual disturbance. Respiratory: Negative for shortness of breath. Cardiovascular: Negative for chest pain and leg swelling. Gastrointestinal: Positive for nausea and vomiting. Negative for abdominal pain, constipation and diarrhea. Genitourinary: Negative for difficulty urinating and frequency. Musculoskeletal: Negative for back pain, myalgias and neck stiffness. Skin: Negative for rash. Neurological: Negative for dizziness, syncope, weakness and headaches. Hematological: Negative for adenopathy. All other systems reviewed and are negative. Vitals:    08/23/17 1201 08/23/17 1243 08/23/17 1300   BP: 110/69  106/76   Pulse: 93  83   Resp: 16  14   Temp: 97.9 °F (36.6 °C)     SpO2: 96% 97% 96%   Weight: 61 kg (134 lb 8 oz)     Height: 5' 8\" (1.727 m)              Physical Exam   Constitutional: He is oriented to person, place, and time. He appears well-developed. No distress. Disheveled male in no acute distress who appears older than stated age. HENT:   Head: Normocephalic and atraumatic. Eyes: Conjunctivae and EOM are normal.   Neck: Normal range of motion. Neck supple. Cardiovascular: Normal rate, regular rhythm and normal heart sounds. No murmur heard. Pulmonary/Chest: Effort normal and breath sounds normal. No respiratory distress. Abdominal: Soft. Bowel sounds are normal. He exhibits no distension. There is no tenderness. There is no rebound. Musculoskeletal: Normal range of motion. He exhibits no edema or tenderness. Neurological: He is alert and oriented to person, place, and time. No cranial nerve deficit. He exhibits normal muscle tone. Coordination normal.   Skin: Skin is warm and dry. Nursing note and vitals reviewed.    Note written by med Estrada, as dictated by Minh Sheriff MD 1:48 PM      [facility]  ED Course       Procedures

## 2017-08-23 NOTE — ED NOTES
Pt given warm blankets. Pt states, Damián Gamez they going to do anything about my hip? \" Reports injuring hip x 3 months ago, ambulatory, no abnormality noted; however pain when lifting leg over tub.  MD made aware of pt request.

## 2017-08-23 NOTE — ED TRIAGE NOTES
Pt stated he feels like he is going to die, pt c/o dizziness, no energy, some n/v, denies fever,  one hour ago, pt stated when he eats he sweats a lot , pt took 46 units of Lantus no sliding scale

## 2018-02-04 ENCOUNTER — APPOINTMENT (OUTPATIENT)
Dept: GENERAL RADIOLOGY | Age: 38
End: 2018-02-04
Attending: EMERGENCY MEDICINE
Payer: COMMERCIAL

## 2018-02-04 ENCOUNTER — HOSPITAL ENCOUNTER (EMERGENCY)
Age: 38
Discharge: HOME | End: 2018-02-04
Attending: EMERGENCY MEDICINE
Payer: COMMERCIAL

## 2018-02-04 VITALS
RESPIRATION RATE: 18 BRPM | TEMPERATURE: 97.8 F | DIASTOLIC BLOOD PRESSURE: 111 MMHG | OXYGEN SATURATION: 100 % | HEART RATE: 98 BPM | BODY MASS INDEX: 21.36 KG/M2 | WEIGHT: 144.18 LBS | SYSTOLIC BLOOD PRESSURE: 159 MMHG | HEIGHT: 69 IN

## 2018-02-04 DIAGNOSIS — M25.552 PAIN OF LEFT HIP JOINT: Primary | ICD-10-CM

## 2018-02-04 LAB
APPEARANCE UR: CLEAR
BACTERIA URNS QL MICRO: NEGATIVE /HPF
BILIRUB UR QL: NEGATIVE
COLOR UR: ABNORMAL
EPITH CASTS URNS QL MICRO: ABNORMAL /LPF
GLUCOSE UR STRIP.AUTO-MCNC: >1000 MG/DL
HGB UR QL STRIP: NEGATIVE
HYALINE CASTS URNS QL MICRO: ABNORMAL /LPF (ref 0–5)
KETONES UR QL STRIP.AUTO: NEGATIVE MG/DL
LEUKOCYTE ESTERASE UR QL STRIP.AUTO: NEGATIVE
NITRITE UR QL STRIP.AUTO: NEGATIVE
PH UR STRIP: 6.5 [PH] (ref 5–8)
PROT UR STRIP-MCNC: NEGATIVE MG/DL
RBC #/AREA URNS HPF: ABNORMAL /HPF (ref 0–5)
SP GR UR REFRACTOMETRY: 1.01 (ref 1–1.03)
UA: UC IF INDICATED,UAUC: ABNORMAL
UROBILINOGEN UR QL STRIP.AUTO: 0.2 EU/DL (ref 0.2–1)
WBC URNS QL MICRO: ABNORMAL /HPF (ref 0–4)

## 2018-02-04 PROCEDURE — 74011250637 HC RX REV CODE- 250/637: Performed by: EMERGENCY MEDICINE

## 2018-02-04 PROCEDURE — 99283 EMERGENCY DEPT VISIT LOW MDM: CPT

## 2018-02-04 PROCEDURE — 81001 URINALYSIS AUTO W/SCOPE: CPT | Performed by: EMERGENCY MEDICINE

## 2018-02-04 PROCEDURE — 73502 X-RAY EXAM HIP UNI 2-3 VIEWS: CPT

## 2018-02-04 RX ORDER — HYDROCODONE BITARTRATE AND ACETAMINOPHEN 7.5; 325 MG/1; MG/1
1 TABLET ORAL
Status: COMPLETED | OUTPATIENT
Start: 2018-02-04 | End: 2018-02-04

## 2018-02-04 RX ORDER — HYDROCODONE BITARTRATE AND ACETAMINOPHEN 7.5; 325 MG/1; MG/1
1 TABLET ORAL
Qty: 10 TAB | Refills: 0 | Status: SHIPPED | OUTPATIENT
Start: 2018-02-04 | End: 2018-02-23

## 2018-02-04 RX ADMIN — HYDROCODONE BITARTRATE AND ACETAMINOPHEN 1 TABLET: 7.5; 325 TABLET ORAL at 14:18

## 2018-02-04 NOTE — ED PROVIDER NOTES
EMERGENCY DEPARTMENT HISTORY AND PHYSICAL EXAM      Date: 2/4/2018  Patient Name: Nina Reynoso    History of Presenting Illness     Chief Complaint   Patient presents with    Flank Pain     left flank pain x3 days       History Provided By: Patient    HPI: Nina Reynoso, 30 y.o. male with PMHx significant for DM, Bipolar 1, Sz, DM, bipolar, presents ambulatory to the ED with cc of acute onset sharp, 8/10 left lower back pain x 3 days. Pt reports his pain is worse with sitting and standing for long periods of time. Pt does report waking up with his pain. He does work as a . Pt denies any hx of kidney stones or similar back pain. Pt specifically denies any fever, nausea, vomiting, dysuria, hematuria, urinary frequency. Social Hx: + Tobacco (1 ppd), - EtOH (-), - illicit drug use (-)    PCP: Stephanie Agosto MD    There are no other complaints, changes, or physical findings at this time. Current Outpatient Prescriptions   Medication Sig Dispense Refill    HYDROcodone-acetaminophen (NORCO) 7.5-325 mg per tablet Take 1 Tab by mouth every six (6) hours as needed for Pain. Max Daily Amount: 4 Tabs. 10 Tab 0    insulin regular (NOVOLIN R) 100 unit/mL injection by SubCUTAneous route as needed (>200 give 6 units, >300 give 8 units, >400 give 10 units).  insulin glargine (LANTUS) 100 unit/mL injection 46 Units by SubCUTAneous route daily.          Past History     Past Medical History:  Past Medical History:   Diagnosis Date    B[facility] 1 disorder, depressed ([facility] [address]     Bipolar disorder ([facility] [address] [facility]     Depression     Diabetes ([facility] [address] [facility]     DKA, type 1 ([facility] [address] 1/27/2013    diagnosed age 23    H/O noncompliance with medical treatment, presenting hazards to health     MRSA (methicillin resistant staph aureus) culture positive     MRSA (methicillin resistant Staphylococcus aureus)     Face    Noncompliance with medication regimen     Second hand smoke exposure     Seizure ([facility] [address]     Seizures ([facility] [address] 2006 or 2007    one episode during senior care       Past Surgical History:  Past Surgical History:   Procedure Laterality Date    HX HEENT      top left wisdom tooth    HX ORTHOPAEDIC Left     wrist; MCV       Family History:  Family History   Problem Relation Age of Onset    Diabetes Other      neice, type 1        Social History:  Social History   Substance Use Topics    Smoking status: Current Every Day Smoker     Packs/day: 1.00     Years: 16.00     Types: Cigarettes    Smokeless tobacco: Never Used    Alcohol use No       Allergies:  No Known Allergies      Review of Systems   Review of Systems   Constitutional: Negative. Negative for appetite change, chills, fatigue and fever. HENT: Negative. Negative for congestion, rhinorrhea, sinus pressure and sore throat. Eyes: Negative. Respiratory: Negative. Negative for cough, choking, chest tightness, shortness of breath and wheezing. Cardiovascular: Negative. Negative for chest pain, palpitations and leg swelling. Gastrointestinal: Negative for abdominal pain, constipation, diarrhea, nausea and vomiting. Endocrine: Negative. Genitourinary: Negative. Negative for difficulty urinating, dysuria, flank pain and urgency. Musculoskeletal: Positive for back pain (lower). Skin: Negative. Neurological: Negative. Negative for dizziness, speech difficulty, weakness, light-headedness, numbness and headaches. Psychiatric/Behavioral: Negative. All other systems reviewed and are negative. Physical Exam   Physical Exam   Constitutional: He is oriented to person, place, and time. He appears well-developed and well-nourished. No distress. HENT:   Head: Normocephalic and atraumatic. Mouth/Throat: Oropharynx is clear and moist. No oropharyngeal exudate. Eyes: Conjunctivae and EOM are normal. Pupils are equal, round, and reactive to light. Neck: Normal range of motion. Neck supple. No JVD present.  No tracheal deviation present. Cardiovascular: Normal rate, regular rhythm, normal heart sounds and intact distal pulses. No murmur heard. Pulmonary/Chest: Effort normal and breath sounds normal. No stridor. No respiratory distress. He has no wheezes. He has no rales. He exhibits no tenderness. Abdominal: Soft. He exhibits no distension. There is no tenderness. There is no rebound and no guarding. No CVAT      Musculoskeletal: Normal range of motion. He exhibits tenderness (left posterior iliac crest,no overlying erythema or skin lesion ). He exhibits no edema or deformity. Neurological: He is alert and oriented to person, place, and time. No cranial nerve deficit. No gross motor or sensory deficits    Skin: Skin is warm and dry. He is not diaphoretic. Psychiatric: He has a normal mood and affect. His behavior is normal.   Nursing note and vitals reviewed.         Diagnostic Study Results     Labs -     Recent Results (from the past 12 hour(s))   URINALYSIS W/ REFLEX CULTURE    Collection Time: 02/04/18  1:31 PM   Result Value Ref Range    Color YELLOW/STRAW      Appearance CLEAR CLEAR      Specific gravity 1.010 1.003 - 1.030      pH (UA) 6.5 5.0 - 8.0      Protein NEGATIVE  NEG mg/dL    Glucose >1000 (A) NEG mg/dL    Ketone NEGATIVE  NEG mg/dL    Bilirubin NEGATIVE  NEG      Blood NEGATIVE  NEG      Urobilinogen 0.2 0.2 - 1.0 EU/dL    Nitrites NEGATIVE  NEG      Leukocyte Esterase NEGATIVE  NEG      UA:UC IF INDICATED CULTURE NOT INDICATED BY UA RESULT CNI      WBC 0-4 0 - 4 /hpf    RBC 0-5 0 - 5 /hpf    Epithelial cells FEW FEW /lpf    Bacteria NEGATIVE  NEG /hpf    Hyaline cast 0-2 0 - 5 /lpf       Radiologic Studies -   XR HIP LT W OR WO PELV 2-3 VWS   Final Result   Study Result      history: Left hip pain     COMPARISON: 8/23/2017     FINDINGS:     2 views of the left hip submitted for review.     No evidence for acute fracture or dislocation.     IMPRESSION  IMPRESSION:     No significant abnormality Medical Decision Making   I am the first provider for this patient. I reviewed the vital signs, available nursing notes, past medical history, past surgical history, family history and social history. Vital Signs-Reviewed the patient's vital signs. Patient Vitals for the past 12 hrs:   Temp Pulse Resp BP SpO2   02/04/18 1327 97.8 °F (36.6 °C) 98 18 (!) 159/111 100 %     Records Reviewed: Nursing Notes    Provider Notes (Medical Decision Making):   DDx: Lumbar strain, hip strain, avulsion, fracture, UTI. ED Course:   Initial assessment performed. The patients presenting problems have been discussed, and they are in agreement with the care plan formulated and outlined with them. I have encouraged them to ask questions as they arise throughout their visit. PROGRESS NOTE:  2:37 PM   reviewed, pt does not have any scripts written in [place] over the past 12 months. Critical Care Time:   None. Disposition:  DISCHARGE NOTE  2:39 PM  The patient has been re-evaluated and is ready for discharge. Reviewed available results with patient. Counseled pt on diagnosis and care plan. Pt has expressed understanding, and all questions have been answered. Pt agrees with plan and agrees to F/U as recommended, or return to the ED if their sxs worsen. Discharge instructions have been provided and explained to the pt, along with reasons to return to the ED. PLAN:  1. Discharge Medication List as of 2/4/2018  2:39 PM      CONTINUE these medications which have NOT CHANGED    Details   insulin regular (NOVOLIN R) 100 unit/mL injection by SubCUTAneous route as needed (>200 give 6 units, >300 give 8 units, >400 give 10 units). , Historical Med      insulin glargine (LANTUS) 100 unit/mL injection 46 Units by SubCUTAneous route daily. , Historical Med           2.    Follow-up Information     Follow up With Details Comments Contact Info    Phys Other, MD   Patient can only remember the practice name and not the physician Return to ED if worse     Diagnosis     Clinical Impression:   1. Pain of left hip joint        Attestations: This note is prepared by Meka Mosqueda acting as Scribe for DO Jose Carlos Vo DO : The scribe's documentation has been prepared under my direction and personally reviewed by me in its entirety. I confirm that the note above accurately reflects all work, treatment, procedures, and medical decision making performed by me.

## 2018-02-04 NOTE — DISCHARGE INSTRUCTIONS
Hip Pain: Care Instructions  Your Care Instructions    Hip pain may be caused by many things, including overuse, a fall, or a twisting movement. Another cause of hip pain is arthritis. Your pain may increase when you stand up, walk, or squat. The pain may come and go or may be constant. Home treatment can help relieve hip pain, swelling, and stiffness. If your pain is ongoing, you may need more tests and treatment. Follow-up care is a key part of your treatment and safety. Be sure to make and go to all appointments, and call your doctor if you are having problems. It's also a good idea to know your test results and keep a list of the medicines you take. How can you care for yourself at home? · Take pain medicines exactly as directed. ¨ If the doctor gave you a prescription medicine for pain, take it as prescribed. ¨ If you are not taking a prescription pain medicine, ask your doctor if you can take an over-the-counter medicine. · Rest and protect your hip. Take a break from any activity, including standing or walking, that may cause pain. · Put ice or a cold pack against your hip for 10 to 20 minutes at a time. Try to do this every 1 to 2 hours for the next 3 days (when you are awake) or until the swelling goes down. Put a thin cloth between the ice and your skin. · Sleep on your healthy side with a pillow between your knees, or sleep on your back with pillows under your knees. · If there is no swelling, you can put moist heat, a heating pad, or a warm cloth on your hip. Do gentle stretching exercises to help keep your hip flexible. · Learn how to prevent falls. Have your vision and hearing checked regularly. Wear slippers or shoes with a nonskid sole. · Stay at a healthy weight. · Wear comfortable shoes. When should you call for help? Call 911 anytime you think you may need emergency care. For example, call if:  ? · You have sudden chest pain and shortness of breath, or you cough up blood.    ? · You are not able to stand or walk or bear weight. ? · Your buttocks, legs, or feet feel numb or tingly. ? · Your leg or foot is cool or pale or changes color. ? · You have severe pain. ?Call your doctor now or seek immediate medical care if:  ? · You have signs of infection, such as:  ¨ Increased pain, swelling, warmth, or redness in the hip area. ¨ Red streaks leading from the hip area. ¨ Pus draining from the hip area. ¨ A fever. ? · You have signs of a blood clot, such as:  ¨ Pain in your calf, back of the knee, thigh, or groin. ¨ Redness and swelling in your leg or groin. ? · You are not able to bend, straighten, or move your leg normally. ? · You have trouble urinating or having bowel movements. ? Watch closely for changes in your health, and be sure to contact your doctor if:  ? · You do not get better as expected. Where can you learn more? Go to http://dwaine-sanjeev.info/. Enter J401 in the search box to learn more about \"Hip Pain: Care Instructions. \"  Current as of: March 20, 2017  Content Version: 11.4  © 5874-1119 [employer]. Care instructions adapted under license by [employer] (which disclaims liability or warranty for this information). If you have questions about a medical condition or this instruction, always ask your healthcare professional. [place] [address] any warranty or liability for your use of this information. Musculoskeletal Pain: Care Instructions  Your Care Instructions    Different problems with the bones, muscles, nerves, ligaments, and tendons in the body can cause pain. One or more areas of your body may ache or burn. Or they may feel tired, stiff, or sore. The medical term for this type of pain is musculoskeletal pain. It can have many different causes. Sometimes the pain is caused by an injury such as a strain or sprain.  Or you might have pain from using one part of your body in the same way over and over again. This is called overuse. In some cases, the cause of the pain is another health problem such as arthritis or fibromyalgia. The doctor will examine you and ask you questions about your health to help find the cause of your pain. Blood tests or imaging tests like an X-ray may also be helpful. But sometimes doctors can't find a cause of the pain. Treatment depends on your symptoms and the cause of the pain, if known. The doctor has checked you carefully, but problems can develop later. If you notice any problems or new symptoms, get medical treatment right away. Follow-up care is a key part of your treatment and safety. Be sure to make and go to all appointments, and call your doctor if you are having problems. It's also a good idea to know your test results and keep a list of the medicines you take. How can you care for yourself at home? · Rest until you feel better. · Do not do anything that makes the pain worse. Return to exercise gradually if you feel better and your doctor says it's okay. · Be safe with medicines. Read and follow all instructions on the label. ¨ If the doctor gave you a prescription medicine for pain, take it as prescribed. ¨ If you are not taking a prescription pain medicine, ask your doctor if you can take an over-the-counter medicine. · Put ice or a cold pack on the area for 10 to 20 minutes at a time to ease pain. Put a thin cloth between the ice and your skin. When should you call for help? Call your doctor now or seek immediate medical care if:  ? · You have new pain, or your pain gets worse. ? · You have new symptoms such as a fever, a rash, or chills. ? Watch closely for changes in your health, and be sure to contact your doctor if:  ? · You do not get better as expected. Where can you learn more? Go to http://dwaine-sanjeev.info/.   Enter N103 in the search box to learn more about \"Musculoskeletal Pain: Care Instructions. \"  Current as of: October 14, 2016  Content Version: 11.4  © 9245-3670 [employer], [facility]. Care instructions adapted under license by [employer] (which disclaims liability or warranty for this information). If you have questions about a medical condition or this instruction, always ask your healthcare professional. [address]  any warranty or liability for your use of this information.       IBUPROFEN 600MG THREE TIMES A DAY FOR THREE DAYS    ICE TO THE ARE FOR 20 MINUTES 3-4 TIMES PER DAY FOR 2 DAYS    LIDOCAINE PATCHES MAY HELP (SALPONAS) THEY CAN BE PURCHASED OVER THE COUNTER

## 2018-02-04 NOTE — ED NOTES
Dr. Sugar Gould  reviewed discharge instructions with the patient. The patient verbalized understanding.

## 2018-02-23 ENCOUNTER — HOSPITAL ENCOUNTER (EMERGENCY)
Age: 38
Discharge: HOME | End: 2018-02-23
Attending: EMERGENCY MEDICINE
Payer: COMMERCIAL

## 2018-02-23 VITALS
WEIGHT: 140.65 LBS | RESPIRATION RATE: 18 BRPM | HEIGHT: 68 IN | SYSTOLIC BLOOD PRESSURE: 159 MMHG | OXYGEN SATURATION: 100 % | BODY MASS INDEX: 21.32 KG/M2 | HEART RATE: 91 BPM | DIASTOLIC BLOOD PRESSURE: 82 MMHG | TEMPERATURE: 98.3 F

## 2018-02-23 DIAGNOSIS — R73.9 HYPERGLYCEMIA: Primary | ICD-10-CM

## 2018-02-23 DIAGNOSIS — M62.830 SPASM OF BACK MUSCLES: ICD-10-CM

## 2018-02-23 LAB
APPEARANCE UR: CLEAR
BACTERIA URNS QL MICRO: NEGATIVE /HPF
BILIRUB UR QL: NEGATIVE
COLOR UR: YELLOW
EPITH CASTS URNS QL MICRO: ABNORMAL /LPF
GLUCOSE BLD STRIP.AUTO-MCNC: 308 MG/DL (ref 65–100)
GLUCOSE UR STRIP.AUTO-MCNC: >1000 MG/DL
HGB UR QL STRIP: ABNORMAL
KETONES UR QL STRIP.AUTO: NEGATIVE MG/DL
LEUKOCYTE ESTERASE UR QL STRIP.AUTO: NEGATIVE
NITRITE UR QL STRIP.AUTO: NEGATIVE
PH UR STRIP: 6.5 [PH] (ref 5–8)
PROT UR STRIP-MCNC: NEGATIVE MG/DL
RBC #/AREA URNS HPF: ABNORMAL /HPF (ref 0–5)
SERVICE CMNT-IMP: ABNORMAL
SP GR UR REFRACTOMETRY: 1.03 (ref 1–1.03)
UROBILINOGEN UR QL STRIP.AUTO: 0.2 EU/DL (ref 0.2–1)
WBC URNS QL MICRO: ABNORMAL /HPF (ref 0–4)

## 2018-02-23 PROCEDURE — 82962 GLUCOSE BLOOD TEST: CPT

## 2018-02-23 PROCEDURE — 81001 URINALYSIS AUTO W/SCOPE: CPT | Performed by: PHYSICIAN ASSISTANT

## 2018-02-23 PROCEDURE — 99284 EMERGENCY DEPT VISIT MOD MDM: CPT

## 2018-02-23 PROCEDURE — 74011250637 HC RX REV CODE- 250/637: Performed by: PHYSICIAN ASSISTANT

## 2018-02-23 RX ORDER — NAPROXEN 250 MG/1
500 TABLET ORAL
Status: COMPLETED | OUTPATIENT
Start: 2018-02-23 | End: 2018-02-23

## 2018-02-23 RX ORDER — HYDROCODONE BITARTRATE AND ACETAMINOPHEN 5; 325 MG/1; MG/1
1 TABLET ORAL
Qty: 6 TAB | Refills: 0 | Status: SHIPPED | OUTPATIENT
Start: 2018-02-23 | End: 2018-03-09

## 2018-02-23 RX ORDER — CYCLOBENZAPRINE HCL 10 MG
10 TABLET ORAL
Status: COMPLETED | OUTPATIENT
Start: 2018-02-23 | End: 2018-02-23

## 2018-02-23 RX ORDER — NAPROXEN 500 MG/1
500 TABLET ORAL
Qty: 20 TAB | Refills: 0 | Status: SHIPPED | OUTPATIENT
Start: 2018-02-23 | End: 2018-03-11

## 2018-02-23 RX ORDER — CYCLOBENZAPRINE HCL 10 MG
10 TABLET ORAL
Qty: 20 TAB | Refills: 0 | Status: SHIPPED | OUTPATIENT
Start: 2018-02-23 | End: 2018-03-11

## 2018-02-23 RX ORDER — HYDROCODONE BITARTRATE AND ACETAMINOPHEN 5; 325 MG/1; MG/1
1 TABLET ORAL
Status: COMPLETED | OUTPATIENT
Start: 2018-02-23 | End: 2018-02-23

## 2018-02-23 RX ADMIN — HYDROCODONE BITARTRATE AND ACETAMINOPHEN 1 TABLET: 5; 325 TABLET ORAL at 17:43

## 2018-02-23 RX ADMIN — NAPROXEN 500 MG: 250 TABLET ORAL at 17:43

## 2018-02-23 RX ADMIN — CYCLOBENZAPRINE HYDROCHLORIDE 10 MG: 10 TABLET, FILM COATED ORAL at 17:43

## 2018-02-23 NOTE — ED PROVIDER NOTES
EMERGENCY DEPARTMENT HISTORY AND PHYSICAL EXAM      Date: 2/23/2018  Patient Name: Jeremías Mcfadden    History of Presenting Illness     Chief Complaint   Patient presents with    Neck Pain    Back Pain     low back after moving off the sofa today       History Provided By: Patient    HPI: Jeremías Mcafdden, 30 y.o. male with PMHx significant for DM, presents ambulatory to the ED with cc of sudden onset of lower back pain just PTA. Pt notes that the back pain radiates down his bilateral legs. He reports additional symptom of right shoulder pain. He describes the pain as \"sharp\" and \"tightness\". Pt notes that the pain started when he stood up from sitting on the sofa. He reports that he has not used any pain medication for the pain. Per pt, he does physical activity for his work, but has not done anything unusual at work. He reports that he felt his blood sugar was high on the way to ED, so he gave himself 15 units of insulin. Pt denies fever, urinary incontinence, fecal incontinence, or injury. PCP: Stephanie Agosto MD    There are no other complaints, changes, or physical findings at this time. Current Outpatient Prescriptions   Medication Sig Dispense Refill    HYDROcodone-acetaminophen (NORCO) 5-325 mg per tablet Take 1 Tab by mouth every eight (8) hours as needed for Pain. Max Daily Amount: 3 Tabs. 6 Tab 0    naproxen (NAPROSYN) 500 mg tablet Take 1 Tab by mouth every twelve (12) hours as needed for Pain. 20 Tab 0    cyclobenzaprine (FLEXERIL) 10 mg tablet Take 1 Tab by mouth three (3) times daily (with meals). 20 Tab 0    insulin regular (NOVOLIN R) 100 unit/mL injection by SubCUTAneous route as needed (>200 give 6 units, >300 give 8 units, >400 give 10 units).  insulin glargine (LANTUS) 100 unit/mL injection 46 Units by SubCUTAneous route daily.          Past History     Past Medical History:  Past Medical History:   Diagnosis Date    Bipolar 1 disorder, depressed [facility]     Bipolar disorder ([facility]     Depression     [facility]     DKA, type 1 ([facility] 1/27/2013    diagnosed age 23    H/O noncompliance with medical treatment, presenting hazards to health     MRSA (methicillin resistant staph aureus) culture positive     MRSA (methicillin resistant Staphylococcus aureus)     Face    Noncompliance with medication regimen     Second hand smoke exposure     Seizure ([facility]     [facility]izures ([facility] 2006 or 2007    one episode during snf       Past Surgical History:  Past Surgical History:   Procedure Laterality Date    HX HEENT      top left wisdom tooth    HX ORTHOPAEDIC Left     wrist; MCV       Family History:  Family History   Problem Relation Age of Onset    Diabetes Other      neice, type 1        Social History:  Social History   Substance Use Topics    Smoking status: Current Every Day Smoker     Packs/day: 1.00     Years: 16.00     Types: Cigarettes    Smokeless tobacco: Never Used    Alcohol use No       Allergies:  No Known Allergies      Review of Systems   Review of Systems   Constitutional: Negative. Negative for fever. HENT: Negative. Eyes: Negative. Respiratory: Negative. Cardiovascular: Negative. Gastrointestinal: Negative. Negative for constipation, diarrhea, nausea and vomiting. Denies liver disease   Endocrine:        Denies thyroid disease   Genitourinary: Negative. Negative for dysuria. Denies kidney disease   Musculoskeletal: Positive for arthralgias (right shoulder), back pain (lower) and myalgias (bilateral lower extremities). Skin: Negative. Neurological: Negative. All other systems reviewed and are negative. Physical Exam   Physical Exam   Constitutional: He is oriented to person, place, and time. He appears well-developed and well-nourished. No distress. HENT:   Head: Normocephalic and atraumatic.    Right Ear: External ear normal.   Left Ear: External ear normal.   Nose: Nose normal.   Mouth/Throat: Oropharynx is clear and moist. No oropharyngeal exudate. Eyes: Conjunctivae and EOM are normal. Pupils are equal, round, and reactive to light. Right eye exhibits no discharge. Left eye exhibits no discharge. No scleral icterus. Neck: Normal range of motion. Neck supple. No tracheal deviation present. Cardiovascular: Normal rate, regular rhythm, normal heart sounds and intact distal pulses. Exam reveals no gallop and no friction rub. No murmur heard. Pulmonary/Chest: Effort normal and breath sounds normal. No respiratory distress. He has no wheezes. He has no rales. He exhibits no tenderness. Abdominal: Soft. Bowel sounds are normal. He exhibits no distension and no mass. There is no tenderness. There is no rebound and no guarding. Musculoskeletal: He exhibits no edema or tenderness. Spasm and tenderness of the bilateral musculature, right rhomboid, and right trapezius  Ambulatory   NVI of the bilateral upper and lower extremities   Lymphadenopathy:     He has no cervical adenopathy. Neurological: He is alert and oriented to person, place, and time. No cranial nerve deficit. Coordination normal.   Skin: Skin is warm and dry. No rash noted. No erythema. Psychiatric: He has a normal mood and affect. His behavior is normal. Judgment and thought content normal.   Nursing note and vitals reviewed.         Diagnostic Study Results     Labs -     Recent Results (from the past 12 hour(s))   URINALYSIS W/MICROSCOPIC    Collection Time: 02/23/18  5:52 PM   Result Value Ref Range    Color YELLOW      Appearance CLEAR CLEAR      Specific gravity 1.030 1.003 - 1.030      pH (UA) 6.5 5.0 - 8.0      Protein NEGATIVE  NEG mg/dL    Glucose >1000 (A) NEG mg/dL    Ketone NEGATIVE  NEG mg/dL    Bilirubin NEGATIVE  NEG      Blood SMALL (A) NEG      Urobilinogen 0.2 0.2 - 1.0 EU/dL    Nitrites NEGATIVE  NEG      Leukocyte Esterase NEGATIVE  NEG      WBC 0-4 0 - 4 /hpf    RBC 10-20 0 - 5 /hpf    Epithelial cells FEW FEW /lpf    Bacteria NEGATIVE  NEG /hpf   GLUCOSE, POC    Collection Time: 02/23/18  6:56 PM   Result Value Ref Range    Glucose (POC) 308 (H) 65 - 100 mg/dL    Performed by Fabi Ruiz (PCT)        Medical Decision Making   I am the first provider for this patient. I reviewed the vital signs, available nursing notes, past medical history, past surgical history, family history and social history. Vital Signs-Reviewed the patient's vital signs. Patient Vitals for the past 12 hrs:   Temp Pulse Resp BP SpO2   02/23/18 1649 98.3 °F (36.8 °C) 91 18 159/82 100 %       Pulse Oximetry Analysis - 100% on room air    Cardiac Monitor:   Rate: 91 bpm  Rhythm: Normal Sinus Rhythm     Records Reviewed: Old Medical Records    Provider Notes (Medical Decision Making):   DDx: dehydration, spasm, repetitive use injury    ED Course:   Initial assessment performed. The patients presenting problems have been discussed, and they are in agreement with the care plan formulated and outlined with them. I have encouraged them to ask questions as they arise throughout their visit. 5:28 PM  Reviewed old XRays and chart from this month. Written by LUIS E Montes, as dictated by Kel Dean. 6:48 PM  Will give pt 800 mL of water to drink orally. Written by LUIS E Montes, as dictated by Kel Dean.    7:01 PM  Pt is feeling better. Discussed monitoring the color of his urine, and his glucose levels. Written by LUIS E Montes, as dictated by Kel Dean. Disposition:  DISCHARGE NOTE:  7:03 PM  The patient is ready for discharge. The patient's signs, symptoms, diagnosis, and discharge instructions have been discussed and the patient has conveyed their understanding. The patient is to follow up as recommended or return to the ER should their symptoms worsen. Plan has been discussed and the patient is in agreement. PLAN:  1.    Current Discharge Medication List      START taking these medications Details   HYDROcodone-acetaminophen (NORCO) 5-325 mg per tablet Take 1 Tab by mouth every eight (8) hours as needed for Pain. Max Daily Amount: 3 Tabs. Qty: 6 Tab, Refills: 0    Associated Diagnoses: Spasm of back muscles      naproxen (NAPROSYN) 500 mg tablet Take 1 Tab by mouth every twelve (12) hours as needed for Pain. Qty: 20 Tab, Refills: 0      cyclobenzaprine (FLEXERIL) 10 mg tablet Take 1 Tab by mouth three (3) times daily (with meals). Qty: 20 Tab, Refills: 0         STOP taking these medications       HYDROcodone-acetaminophen (NORCO) 7.5-325 mg per tablet Comments:   Reason for Stoppin.   Follow-up Information     Follow up With Details Comments Contact Info    Your Primary Care Provider Schedule an appointment as soon as possible for a visit monitor your sugar levels and the color of your urine (clear/pale yellow = good)     [facility] EMERGENCY DEPT  If symptoms worsen [address] [place] [address]  [phone]        Return to ED if worse     Diagnosis     Clinical Impression:   1. Hyperglycemia    2. Spasm of back muscles        Attestations: This note is prepared by Keyla Jett, acting as Scribe for LIVIA Acevse: The scribe's documentation has been prepared under my direction and personally reviewed by me in its entirety. I confirm that the note above accurately reflects all work, treatment, procedures, and medical decision making performed by me.

## 2018-02-23 NOTE — ED NOTES
Pt arrived ambulatory from triage. Pt with c/o of neck, back, and lower pain. Patient stated he stood up and he entire body began to hurt. Pt resting in position of comfort. Call bell within reach.

## 2018-02-23 NOTE — LETTER
[address] 
[facility] EMERGENCY DEPT 
[address] [place] [address] [address] [id number] [phone] Work/School Note Date: 2/23/2018 To Whom It May concern: Giovany Thomas was seen and treated today in the emergency room by the following provider(s): 
Attending Provider: Mehul Richardson MD 
Physician Assistant: MANUEL King. Giovany Thomas may return to work on 2/26/18 or sooner, if feeling better. Sincerely, Daylin Aldridge PA

## 2018-02-24 NOTE — DISCHARGE INSTRUCTIONS
Back Spasm: Care Instructions  Your Care Instructions  A back spasm is sudden tightness and pain in your back muscles. It may happen from overuse or an injury. Things like sleeping in an awkward way, bending, lifting, standing, or sitting can sometimes cause a spasm. But the cause isn't always clear. Home treatment includes using heat or ice, taking over-the-counter (OTC) pain medicines, and avoiding activities that may cause back pain. For a back spasm that doesn't get better with home care, your doctor may prescribe medicine. Treatments such as massage or manipulation may also help ease a back spasm. Your doctor may also suggest exercise or physical therapy to help improve strength and flexibility in your back muscles. In most cases, getting back to your normal activities is good foryour back. Just make sure to avoid doing things that make your pain worse. Follow-up care is a key part of your treatment and safety. Be sure to make and go to all appointments, and call your doctor if you are having problems. It's also a good idea to know your test results and keep a list of the medicines you take. How can you care for yourself at home? ? Heat, ice, and medicines  ? · To relieve pain, use heat or ice (whichever feels better) on the affected area. ¨ Put a warm water bottle, a heating pad set on low, or a warm cloth on your back. Put a thin cloth between the heating pad and your skin. Do not go to sleep with a heating pad on your skin. ¨ Try ice or a cold pack on the area for 10 to 20 minutes at a time. Put a thin cloth between the ice and your skin. ? · Ask your doctor if you can take acetaminophen (such as Tylenol) or nonsteroidal anti-inflammatory drugs, such as ibuprofen or naproxen. Your doctor can prescribe stronger medicines if needed. Be safe with medicines. Read and follow all instructions on the label. ?Body positions and posture  ?  · Sit or lie in positions that are most comfortable for you and that reduce pain. Try one of these positions when you lie down:  ¨ Lie on your back with your knees bent and supported by large pillows. ¨ Lie on the floor with your legs on the seat of a sofa or chair. Isidore Inoue on your side with your knees and hips bent and a pillow between your legs. ¨ Lie on your stomach if it does not make pain worse. ? · Do not sit up in bed. Avoid soft couches and twisted positions. ? · Avoid bed rest after the first day of back pain. Bed rest can help relieve pain at first, but it delays healing. Continued rest without activity is usually not good for your back. ? · If you must sit for long periods of time, take breaks from sitting. Change positions every 30 minutes. Get up and walk around, or lie in a comfortable position. Activity  ? · Take short walks several times a day. You can start with 5 to 10 minutes, 3 or 4 times a day, and work up to longer walks. Walk on level surfaces and avoid hills and stairs until your back starts to feel better. ? · After your back spasm starts to feel better, try to stretch your muscles every day, especially before and after exercise and at bedtime. Regular stretching can help relax your muscles. ? · To prevent future back pain, do exercises to stretch and strengthen your back and stomach. Learn to use good posture, safe lifting techniques, and other ways to move to help you avoid back pain. When should you call for help? Call 911 anytime you think you may need emergency care. For example, call if:  ? · You are unable to move an arm or a leg at all. ?Call your doctor now or seek immediate medical care if:  ? · You have new or worse symptoms in your legs, belly, or buttocks. Symptoms may include:  ¨ Numbness or tingling. ¨ Weakness. ¨ Pain. ? · You lose bladder or bowel control. ? Watch closely for changes in your health, and be sure to contact your doctor if:  ? · You do not get better as expected. Where can you learn more?   Go to http://dwaine-sanjeev.info/. Enter E232 in the search box to learn more about \"Back Spasm: Care Instructions. \"  Current as of: March 21, 2017  Content Version: 11.4  © 9731-8195 [employer]. Care instructions adapted under license by [employer] (which disclaims liability or warranty for this information). If you have questions about a medical condition or this instruction, always ask your healthcare professional. [address] any warranty or liability for your use of this information. Learning About High Blood Sugar  What is high blood sugar? Your body turns the food you eat into glucose (sugar), which it uses for energy. But if your body isn't able to use the sugar right away, it can build up in your blood and lead to high blood sugar. When the amount of sugar in your blood stays too high for too much of the time, you may have diabetes. Diabetes is a disease that can cause serious health problems. The good news is that lifestyle changes may help you get your blood sugar back to normal and avoid or delay diabetes. What causes high blood sugar? Sugar (glucose) can build up in your blood if you:  · Are overweight. · Have a family history of diabetes. · Take certain medicines, such as steroids. What are the symptoms? Having high blood sugar may not cause any symptoms at all. Or it may make you feel very thirsty or very hungry. You may also urinate more often than usual, have blurry vision, or lose weight without trying. How is high blood sugar treated? You can take steps to lower your blood sugar level if you understand what makes it get higher. Your doctor may want you to learn how to test your blood sugar level at home. Then you can see how illness, stress, or different kinds of food or medicine raise or lower your blood sugar level. Other tests may be needed to see if you have diabetes.   How can you prevent high blood sugar?  · Watch your weight. If you're overweight, losing just a small amount of weight may help. Reducing fat around your waist is most important. · Limit the amount of calories, sweets, and unhealthy fat you eat. Ask your doctor if a dietitian can help you. A registered dietitian can help you create meal plans that fit your lifestyle. · Get at least 30 minutes of exercise on most days of the week. Exercise helps control your blood sugar. It also helps you maintain a healthy weight. Walking is a good choice. You also may want to do other activities, such as running, swimming, cycling, or playing tennis or team sports. · If your doctor prescribed medicines, take them exactly as prescribed. Call your doctor if you think you are having a problem with your medicine. You will get more details on the specific medicines your doctor prescribes. Follow-up care is a key part of your treatment and safety. Be sure to make and go to all appointments, and call your doctor if you are having problems. It's also a good idea to know your test results and keep a list of the medicines you take. Where can you learn more? Go to http://dwaine-sanjeev.info/. Enter O108 in the search box to learn more about \"Learning About High Blood Sugar. \"  Current as of: March 13, 2017  Content Version: 11.4  © 5268-8120 [employer]. Care instructions adapted under license by [employer] (which disclaims liability or warranty for this information). If you have questions about a medical condition or this instruction, always ask your healthcare professional. [address] any warranty or liability for your use of this information.

## 2018-02-24 NOTE — ED NOTES
Discharge instructions given to patient by PA Elayne Halsted. Patient verbalized understanding of discharge instructions. Pt discharged without difficulty. Pt discharged in stable condition via ambulation.

## 2018-03-09 ENCOUNTER — HOSPITAL ENCOUNTER (EMERGENCY)
Age: 38
Discharge: HOME | End: 2018-03-09
Attending: EMERGENCY MEDICINE
Payer: COMMERCIAL

## 2018-03-09 ENCOUNTER — APPOINTMENT (OUTPATIENT)
Dept: GENERAL RADIOLOGY | Age: 38
End: 2018-03-09
Attending: PHYSICIAN ASSISTANT
Payer: COMMERCIAL

## 2018-03-09 VITALS
DIASTOLIC BLOOD PRESSURE: 103 MMHG | RESPIRATION RATE: 18 BRPM | BODY MASS INDEX: 22.29 KG/M2 | WEIGHT: 147.05 LBS | HEIGHT: 68 IN | OXYGEN SATURATION: 100 % | TEMPERATURE: 98.2 F | SYSTOLIC BLOOD PRESSURE: 190 MMHG | HEART RATE: 96 BPM

## 2018-03-09 DIAGNOSIS — E10.8 TYPE 1 DIABETES MELLITUS WITH COMPLICATION (HCC): ICD-10-CM

## 2018-03-09 DIAGNOSIS — S91.332A PUNCTURE WOUND OF LEFT FOOT, INITIAL ENCOUNTER: Primary | ICD-10-CM

## 2018-03-09 DIAGNOSIS — Z23 NEED FOR TETANUS BOOSTER: ICD-10-CM

## 2018-03-09 PROCEDURE — 90471 IMMUNIZATION ADMIN: CPT

## 2018-03-09 PROCEDURE — 74011250636 HC RX REV CODE- 250/636: Performed by: PHYSICIAN ASSISTANT

## 2018-03-09 PROCEDURE — 96372 THER/PROPH/DIAG INJ SC/IM: CPT

## 2018-03-09 PROCEDURE — 90715 TDAP VACCINE 7 YRS/> IM: CPT | Performed by: PHYSICIAN ASSISTANT

## 2018-03-09 PROCEDURE — 99283 EMERGENCY DEPT VISIT LOW MDM: CPT

## 2018-03-09 PROCEDURE — 73630 X-RAY EXAM OF FOOT: CPT

## 2018-03-09 PROCEDURE — 74011250637 HC RX REV CODE- 250/637: Performed by: PHYSICIAN ASSISTANT

## 2018-03-09 PROCEDURE — 74011000250 HC RX REV CODE- 250: Performed by: PHYSICIAN ASSISTANT

## 2018-03-09 PROCEDURE — 77030013175 HC SHOE PSTOP DJOR -A

## 2018-03-09 RX ORDER — IBUPROFEN 800 MG/1
800 TABLET ORAL
Qty: 30 TAB | Refills: 0 | Status: SHIPPED | OUTPATIENT
Start: 2018-03-09 | End: 2018-06-08

## 2018-03-09 RX ORDER — DOXYCYCLINE HYCLATE 100 MG
100 TABLET ORAL 2 TIMES DAILY
Qty: 14 TAB | Refills: 0 | Status: SHIPPED | OUTPATIENT
Start: 2018-03-09 | End: 2018-03-14

## 2018-03-09 RX ORDER — LEVOFLOXACIN 750 MG/1
750 TABLET ORAL DAILY
Qty: 7 TAB | Refills: 0 | Status: SHIPPED | OUTPATIENT
Start: 2018-03-09 | End: 2018-03-14

## 2018-03-09 RX ORDER — IBUPROFEN 400 MG/1
800 TABLET ORAL
Status: COMPLETED | OUTPATIENT
Start: 2018-03-09 | End: 2018-03-09

## 2018-03-09 RX ADMIN — TETANUS TOXOID, REDUCED DIPHTHERIA TOXOID AND ACELLULAR PERTUSSIS VACCINE, ADSORBED 0.5 ML: 5; 2.5; 8; 8; 2.5 SUSPENSION INTRAMUSCULAR at 20:32

## 2018-03-09 RX ADMIN — IBUPROFEN 800 MG: 400 TABLET, FILM COATED ORAL at 20:32

## 2018-03-09 RX ADMIN — BACITRACIN ZINC, NEOMYCIN SULFATE, POLYMYXIN B SULFATE 1 PACKET: 3.5; 5000; 4 OINTMENT TOPICAL at 20:31

## 2018-03-09 RX ADMIN — LIDOCAINE HYDROCHLORIDE 1 G: 10 INJECTION, SOLUTION EPIDURAL; INFILTRATION; INTRACAUDAL; PERINEURAL at 20:32

## 2018-03-09 NOTE — LETTER
[address] 
[facility] EMERGENCY DEPT 
[address] [place] [address] [id number] [phone] Work/School Note Date: 3/9/2018 To Whom It May concern: Rodrigo Parada was seen and treated today in the emergency room by the following provider(s): 
Attending Provider: Adriane Contreras DO Physician Assistant: Luca Nguyen PA-C. Rodrigo Parada may return to work on 13 March 2018. Sincerely, Luca Nguyen PA-C

## 2018-03-10 NOTE — DISCHARGE INSTRUCTIONS
Thank you for allowing us to provide you with excellent care today. We hope we addressed all of your concerns and needs. We strive to provide excellent quality care in the Emergency Department. Please rate us as excellent, as anything less than excellent does not meet our expectations. If you feel that you have not received excellent quality care or timely care, please ask to speak to the nurse manager. Please choose us in the future for your continued health care needs. The exam and treatment you received in the Emergency Department were for an urgent problem and are not intended as complete care. It is important that you follow-up with a doctor, nurse practitioner, or physician assistant to:  (1) confirm your diagnosis,  (2) re-evaluation of changes in your illness and treatment, and  (3) for ongoing care. If your symptoms become worse or you do not improve as expected and you are unable to reach your usual health care provider, you should return to the Emergency Department. We are available 24 hours a day. Take this sheet with you when you go to your follow-up visit. If you have any problem arranging the follow-up visit, contact [address] [place] [address] 21 [phone])    Make an appointment with your Primary Care doctor for follow up of this visit. Return to the ER if you are unable to be seen in the time recommended on your discharge instructions. Puncture Wounds: Care Instructions  Your Care Instructions    A puncture wound can happen anywhere on your body. These wounds tend to be narrower and deeper than cuts. A puncture wound is usually left open instead of being closed. This is because a puncture wound can be easily infected, and closing it can make infection even more likely. You will probably have a bandage over the wound. The doctor has checked you carefully, but problems can develop later. If you notice any problems or new symptoms, get medical treatment right away.   Follow-up care is a key part of your treatment and safety. Be sure to make and go to all appointments, and call your doctor if you are having problems. It's also a good idea to know your test results and keep a list of the medicines you take. How can you care for yourself at home? · Keep the wound dry for the first 24 to 48 hours. After this, you can shower if your doctor okays it. Pat the wound dry. · Don't soak the wound, such as in a bathtub. Your doctor will tell you when it's safe to get the wound wet. · If your doctor told you how to care for your wound, follow your doctor's instructions. If you did not get instructions, follow this general advice:  ¨ After the first 24 to 48 hours, wash the wound with clean water 2 times a day. Don't use hydrogen peroxide or alcohol, which can slow healing. ¨ You may cover the wound with a thin layer of petroleum jelly, such as Vaseline, and a nonstick bandage. ¨ Apply more petroleum jelly and replace the bandage as needed. · Prop up the sore area on pillows anytime you sit or lie down during the next 3 days. Try to keep it above the level of your heart. This helps reduce swelling. · Avoid any activity that could cause your wound to get worse. · Be safe with medicines. Read and follow all instructions on the label. ¨ If the doctor gave you a prescription medicine for pain, take it as prescribed. ¨ If you are not taking a prescription pain medicine, ask your doctor if you can take an over-the-counter medicine. · If your doctor prescribed antibiotics, take them as directed. Do not stop taking them just because you feel better. You need to take the full course of antibiotics. When should you call for help? Call your doctor now or seek immediate medical care if:  ? · You have new pain, or your pain gets worse. ? · The wound starts to bleed, and blood soaks through the bandage. Oozing small amounts of blood is normal.   ? · The skin near the wound is cold or pale or changes color.    ? · You have tingling, weakness, or numbness near the wound. ? · You have trouble moving the area near the wound. ? · You have symptoms of infection, such as:  ¨ Increased pain, swelling, warmth, or redness around the wound. ¨ Red streaks leading from the wound. ¨ Pus draining from the wound. ¨ A fever. ? Watch closely for changes in your health, and be sure to contact your doctor if:  ? · The wound is not closing (getting smaller). ? · You do not get better as expected. Where can you learn more? Go to http://dwaine-sanjeev.info/. Enter D961 in the search box to learn more about \"Puncture Wounds: Care Instructions. \"  Current as of: March 20, 2017  Content Version: 11.4  © 8732-8785 [employer]. Care instructions adapted under license by [employer] (which disclaims liability or warranty for this information). If you have questions about a medical condition or this instruction, always ask your healthcare professional. [address] any warranty or liability for your use of this information.

## 2018-03-10 NOTE — ED PROVIDER NOTES
EMERGENCY DEPARTMENT HISTORY AND PHYSICAL EXAM      Date: 3/9/2018  Patient Name: Ney Vargas    History of Presenting Illness     Chief Complaint   Patient presents with    Foot Pain     c/o wound to left foot x five days secondary to stepping on a nail       History Provided By: Patient    HPI: Ney Vargas, 30 y.o. male with PMHx significant for DM, MRSA, DKA, and seizures presents ambulatory to the ED with cc of left foot pain x 5 days. The pt states that his foot pain began after he stepped on a nail that punctured the bottom of his left foot through his shoe. He reports applying neosporin to the wound but notes that his pain has been progressively worsening. He denies knowing when his last tetanus vaccination was. Denies fevers, chills, or redness of his foot. PCP: Stephanie Agosto MD    There are no other complaints, changes, or physical findings at this time. Current Outpatient Prescriptions   Medication Sig Dispense Refill    levoFLOXacin (LEVAQUIN) 750 mg tablet Take 1 Tab by mouth daily for 7 days. 7 Tab 0    doxycycline (VIBRA-TABS) 100 mg tablet Take 1 Tab by mouth two (2) times a day for 7 days. 14 Tab 0    ibuprofen (MOTRIN) 800 mg tablet Take 1 Tab by mouth every eight (8) hours as needed for Pain. 30 Tab 0    naproxen (NAPROSYN) 500 mg tablet Take 1 Tab by mouth every twelve (12) hours as needed for Pain. 20 Tab 0    cyclobenzaprine (FLEXERIL) 10 mg tablet Take 1 Tab by mouth three (3) times daily (with meals). 20 Tab 0    insulin regular (NOVOLIN R) 100 unit/mL injection by SubCUTAneous route as needed (>200 give 6 units, >300 give 8 units, >400 give 10 units).  insulin glargine (LANTUS) 100 unit/mL injection 46 Units by SubCUTAneous route daily.          Past History     Past Medical History:  Past Medical History:   Diagnosis Date    [facility] [facility]     Bipolar disorder ([facility] [facility]     Depression     Diabetes ([facility] [facility]     DKA, type 1 ([facility] [address] 1/27/2013 diagnosed age 23    H/O noncompliance with medical treatment, presenting hazards to health     MRSA (methicillin resistant staph aureus) culture positive     MRSA (methicillin resistant Staphylococcus aureus)     Face    Noncompliance with medication regimen     Second hand smoke exposure     Seizure ([address]     Seizures ([facility] [address] 2006 or 2007    one episode during alf       Past Surgical History:  Past Surgical History:   Procedure Laterality Date    HX HEENT      top left wisdom tooth    HX ORTHOPAEDIC Left     wrist; MCV       Family History:  Family History   Problem Relation Age of Onset    Diabetes Other      neice, type 1        Social History:  Social History   Substance Use Topics    Smoking status: Current Every Day Smoker     Packs/day: 1.00     Years: 16.00     Types: Cigarettes    Smokeless tobacco: Never Used    Alcohol use No       Allergies:  No Known Allergies      Review of Systems   Review of Systems   Constitutional: Negative for chills, diaphoresis and fever. HENT: Negative for rhinorrhea and sore throat. Eyes: Negative for pain. Respiratory: Negative for shortness of breath, wheezing and stridor. Cardiovascular: Negative for chest pain and leg swelling. Gastrointestinal: Negative for abdominal pain, diarrhea, nausea and vomiting. Genitourinary: Negative for difficulty urinating, dysuria, flank pain and hematuria. Musculoskeletal: Negative for back pain and neck stiffness. Skin: Positive for wound. Negative for rash. Neurological: Negative for dizziness, seizures, syncope, weakness, light-headedness and headaches. Psychiatric/Behavioral: Negative for behavioral problems and confusion. All other systems reviewed and are negative. Physical Exam   Physical Exam   Constitutional: He is oriented to person, place, and time. He appears well-developed and well-nourished. No distress. HENT:   Head: Normocephalic and atraumatic.    Right Ear: External ear normal. Left Ear: External ear normal.   Nose: Nose normal.   Eyes: Conjunctivae and EOM are normal. Right eye exhibits no discharge. Left eye exhibits no discharge. No scleral icterus. Neck: Normal range of motion. Neck supple. Cardiovascular: Normal rate, regular rhythm and normal heart sounds. No murmur heard. Pulses:       Dorsalis pedis pulses are 2+ on the right side, and 2+ on the left side. Pulmonary/Chest: Effort normal and breath sounds normal. No stridor. No respiratory distress. He has no decreased breath sounds. He has no wheezes. Abdominal: Soft. Bowel sounds are normal. He exhibits no distension. There is no tenderness. There is no rebound. Musculoskeletal: Normal range of motion. He exhibits no edema. Left foot: There is tenderness. Feet:    Neurological: He is alert and oriented to person, place, and time. Skin: Skin is warm and dry. No rash noted. He is not diaphoretic. Puncture wound to the distal aspect of the left foot with surrounding tenderness and swelling. No palpable abscesses or current drainage noted. No necrotic tissue. Cap refill < less than 2 seconds. Psychiatric: He has a normal mood and affect. Judgment normal.   Nursing note and vitals reviewed. Diagnostic Study Results     Radiologic Studies -   XR FOOT LT MIN 3 V   Final Result        EXAM:  XR FOOT LT MIN 3 V     INDICATION:   Left foot pain since stepping on a nail 5 days ago. Diabetes. .     COMPARISON:  None.     FINDINGS:  Three views of the left foot demonstrate no fracture or other acute  osseous or articular abnormality. The soft tissues are within normal limits. Bones are osteopenic. No radiodense foreign body. No fracture or evidence of  osteomyelitis.     IMPRESSION  IMPRESSION:       Osteopenia. No acute process. No radiodense foreign body. Medical Decision Making   I am the first provider for this patient.     I reviewed the vital signs, available nursing notes, past medical history, past surgical history, family history and social history. Vital Signs-Reviewed the patient's vital signs. Patient Vitals for the past 12 hrs:   Temp Pulse Resp BP SpO2   03/09/18 1803 98.2 °F (36.8 °C) 96 18 (!) 190/103 100 %     Records Reviewed: Nursing Notes    Provider Notes (Medical Decision Making):   DDx: Abscess, cellulitis, need for tetanus, foreign body, diabetic foot wound     ED Course:   Initial assessment performed. The patients presenting problems have been discussed, and they are in agreement with the care plan formulated and outlined with them. I have encouraged them to ask questions as they arise throughout their visit. 8:30 PM  Bo Hill's final results have been reviewed with him. He has been counseled regarding his diagnosis. He verbally conveys understanding and agreement of the signs, symptoms, diagnosis, treatment and prognosis . Disposition:  DISCHARGE NOTE  8:34 PM  The patient has been re-evaluated and is ready for discharge. Reviewed available results with patient. Counseled pt on diagnosis and care plan. Pt has expressed understanding, and all questions have been answered. Pt agrees with plan and agrees to F/U as recommended, or return to the ED if their sxs worsen. Discharge instructions have been provided and explained to the pt, along with reasons to return to the ED. PLAN:  1. Discharge home  2. Medications as directed  3. Schedule f/u with PCP or return to ED in 2-3 days for wound recheck  4. Return precautions reviewed    Current Discharge Medication List      START taking these medications    Details   levoFLOXacin (LEVAQUIN) 750 mg tablet Take 1 Tab by mouth daily for 7 days. Qty: 7 Tab, Refills: 0      doxycycline (VIBRA-TABS) 100 mg tablet Take 1 Tab by mouth two (2) times a day for 7 days. Qty: 14 Tab, Refills: 0      ibuprofen (MOTRIN) 800 mg tablet Take 1 Tab by mouth every eight (8) hours as needed for Pain.   Qty: 30 Tab, Refills: 0 STOP taking these medications       HYDROcodone-acetaminophen (NORCO) 5-325 mg per tablet Comments:   Reason for Stoppin.   Follow-up Information     Follow up With Details Comments Contact Radames Agosto MD In 3 days For wound re-check Patient can only remember the practice name and not the physician      hospitals EMERGENCY DEPT  As needed, If symptoms worsen [address] [place] [address]  [phone]        Return to ED if worse     Diagnosis     Clinical Impression:   1. Puncture wound of left foot, initial encounter    2. Type 1 diabetes mellitus with complication (HCC)    3. Need for tetanus booster        Attestations: This note is prepared by Lisa Jacob, acting as Scribe for Mark Dias PA-C. Mark Dias PA-C: The scribe's documentation has been prepared under my direction and personally reviewed by me in its entirety. I confirm that the note above accurately reflects all work, treatment, procedures, and medical decision making performed by me.

## 2018-03-11 ENCOUNTER — APPOINTMENT (OUTPATIENT)
Dept: GENERAL RADIOLOGY | Age: 38
DRG: 871 | End: 2018-03-11
Payer: SELF-PAY

## 2018-03-11 ENCOUNTER — APPOINTMENT (OUTPATIENT)
Dept: CT IMAGING | Age: 38
DRG: 871 | End: 2018-03-11
Attending: INTERNAL MEDICINE
Payer: SELF-PAY

## 2018-03-11 ENCOUNTER — HOSPITAL ENCOUNTER (INPATIENT)
Age: 38
LOS: 3 days | Discharge: HOME | DRG: 871 | End: 2018-03-14
Attending: EMERGENCY MEDICINE | Admitting: INTERNAL MEDICINE
Payer: SELF-PAY

## 2018-03-11 DIAGNOSIS — L03.116 CELLULITIS OF LEFT FOOT: Primary | ICD-10-CM

## 2018-03-11 DIAGNOSIS — F17.200 TOBACCO DEPENDENCE: ICD-10-CM

## 2018-03-11 DIAGNOSIS — T14.8XXA PUNCTURE WOUND: ICD-10-CM

## 2018-03-11 DIAGNOSIS — Z91.14 H/O MEDICATION NONCOMPLIANCE: ICD-10-CM

## 2018-03-11 PROBLEM — A41.9 SEPSIS (HCC): Status: ACTIVE | Noted: 2018-03-11

## 2018-03-11 PROBLEM — E87.1 HYPONATREMIA: Status: ACTIVE | Noted: 2018-03-11

## 2018-03-11 PROBLEM — E11.9 DM (DIABETES MELLITUS) (HCC): Status: ACTIVE | Noted: 2018-03-11

## 2018-03-11 LAB
ADMINISTERED INITIALS, ADMINIT: NORMAL
ALBUMIN SERPL-MCNC: 3.3 G/DL (ref 3.5–5)
ALBUMIN/GLOB SERPL: 0.7 {RATIO} (ref 1.1–2.2)
ALP SERPL-CCNC: 210 U/L (ref 45–117)
ALT SERPL-CCNC: 32 U/L (ref 12–78)
ANION GAP SERPL CALC-SCNC: 5 MMOL/L (ref 5–15)
ANION GAP SERPL CALC-SCNC: 5 MMOL/L (ref 5–15)
APPEARANCE UR: CLEAR
AST SERPL-CCNC: 17 U/L (ref 15–37)
BACTERIA URNS QL MICRO: NEGATIVE /HPF
BASOPHILS # BLD: 0 K/UL (ref 0–0.1)
BASOPHILS NFR BLD: 0 % (ref 0–1)
BILIRUB SERPL-MCNC: 0.4 MG/DL (ref 0.2–1)
BILIRUB UR QL: NEGATIVE
BUN SERPL-MCNC: 10 MG/DL (ref 6–20)
BUN SERPL-MCNC: 9 MG/DL (ref 6–20)
BUN/CREAT SERPL: 15 (ref 12–20)
BUN/CREAT SERPL: 8 (ref 12–20)
CALCIUM SERPL-MCNC: 8.1 MG/DL (ref 8.5–10.1)
CALCIUM SERPL-MCNC: 9.2 MG/DL (ref 8.5–10.1)
CHLORIDE SERPL-SCNC: 103 MMOL/L (ref 97–108)
CHLORIDE SERPL-SCNC: 89 MMOL/L (ref 97–108)
CO2 SERPL-SCNC: 29 MMOL/L (ref 21–32)
CO2 SERPL-SCNC: 29 MMOL/L (ref 21–32)
COLOR UR: ABNORMAL
CREAT SERPL-MCNC: 0.62 MG/DL (ref 0.7–1.3)
CREAT SERPL-MCNC: 1.18 MG/DL (ref 0.7–1.3)
CRP SERPL-MCNC: 7.6 MG/DL (ref 0–0.6)
D50 ADMINISTERED, D50ADM: 0 ML
D50 ADMINISTERED, D50ADM: 9 ML
D50 ORDER, D50ORD: 0 ML
D50 ORDER, D50ORD: 9 ML
DIFFERENTIAL METHOD BLD: ABNORMAL
EOSINOPHIL # BLD: 0.1 K/UL (ref 0–0.4)
EOSINOPHIL NFR BLD: 1 % (ref 0–7)
EPITH CASTS URNS QL MICRO: ABNORMAL /LPF
ERYTHROCYTE [DISTWIDTH] IN BLOOD BY AUTOMATED COUNT: 11.9 % (ref 11.5–14.5)
ERYTHROCYTE [SEDIMENTATION RATE] IN BLOOD: 54 MM/HR (ref 0–15)
EST. AVERAGE GLUCOSE BLD GHB EST-MCNC: 332 MG/DL
GLOBULIN SER CALC-MCNC: 4.7 G/DL (ref 2–4)
GLSCOM COMMENTS: NORMAL
GLUCOSE BLD STRIP.AUTO-MCNC: 143 MG/DL (ref 65–100)
GLUCOSE BLD STRIP.AUTO-MCNC: 399 MG/DL (ref 65–100)
GLUCOSE BLD STRIP.AUTO-MCNC: 77 MG/DL (ref 65–100)
GLUCOSE BLD STRIP.AUTO-MCNC: 90 MG/DL (ref 65–100)
GLUCOSE BLD STRIP.AUTO-MCNC: 91 MG/DL (ref 65–100)
GLUCOSE SERPL-MCNC: 731 MG/DL (ref 65–100)
GLUCOSE SERPL-MCNC: 92 MG/DL (ref 65–100)
GLUCOSE UR STRIP.AUTO-MCNC: >1000 MG/DL
GLUCOSE, GLC: 143 MG/DL
GLUCOSE, GLC: 399 MG/DL
GLUCOSE, GLC: 77 MG/DL
GLUCOSE, GLC: 90 MG/DL
GLUCOSE, GLC: 91 MG/DL
HBA1C MFR BLD: 13.2 % (ref 4.2–6.3)
HCT VFR BLD AUTO: 39.9 % (ref 36.6–50.3)
HGB BLD-MCNC: 13.7 G/DL (ref 12.1–17)
HGB UR QL STRIP: ABNORMAL
HIGH TARGET, HITG: 250 MG/DL
HYALINE CASTS URNS QL MICRO: ABNORMAL /LPF (ref 0–5)
IMM GRANULOCYTES # BLD: 0.1 K/UL (ref 0–0.04)
IMM GRANULOCYTES NFR BLD AUTO: 1 % (ref 0–0.5)
INSULIN ADMINSTERED, INSADM: 0 UNITS/HOUR
INSULIN ADMINSTERED, INSADM: 0.8 UNITS/HOUR
INSULIN ADMINSTERED, INSADM: 6.8 UNITS/HOUR
INSULIN ORDER, INSORD: 0 UNITS/HOUR
INSULIN ORDER, INSORD: 0.8 UNITS/HOUR
INSULIN ORDER, INSORD: 6.8 UNITS/HOUR
KETONES UR QL STRIP.AUTO: NEGATIVE MG/DL
LACTATE SERPL-SCNC: 1.4 MMOL/L (ref 0.4–2)
LACTATE SERPL-SCNC: 2.9 MMOL/L (ref 0.4–2)
LEUKOCYTE ESTERASE UR QL STRIP.AUTO: NEGATIVE
LOW TARGET, LOT: 150 MG/DL
LYMPHOCYTES # BLD: 0.8 K/UL (ref 0.8–3.5)
LYMPHOCYTES NFR BLD: 6 % (ref 12–49)
MAGNESIUM SERPL-MCNC: 1.9 MG/DL (ref 1.6–2.4)
MCH RBC QN AUTO: 32.4 PG (ref 26–34)
MCHC RBC AUTO-ENTMCNC: 34.3 G/DL (ref 30–36.5)
MCV RBC AUTO: 94.3 FL (ref 80–99)
MINUTES UNTIL NEXT BG, NBG: 15 MIN
MINUTES UNTIL NEXT BG, NBG: 60 MIN
MONOCYTES # BLD: 0.9 K/UL (ref 0–1)
MONOCYTES NFR BLD: 7 % (ref 5–13)
MULTIPLIER, MUL: 0
MULTIPLIER, MUL: 0.01
MULTIPLIER, MUL: 0.02
NEUTS SEG # BLD: 11.1 K/UL (ref 1.8–8)
NEUTS SEG NFR BLD: 85 % (ref 32–75)
NITRITE UR QL STRIP.AUTO: NEGATIVE
NRBC # BLD: 0 K/UL (ref 0–0.01)
NRBC BLD-RTO: 0 PER 100 WBC
ORDER INITIALS, ORDINIT: NORMAL
PH UR STRIP: 5.5 [PH] (ref 5–8)
PHOSPHATE SERPL-MCNC: 2 MG/DL (ref 2.6–4.7)
PLATELET # BLD AUTO: 291 K/UL (ref 150–400)
PMV BLD AUTO: 10.8 FL (ref 8.9–12.9)
POTASSIUM SERPL-SCNC: 3.7 MMOL/L (ref 3.5–5.1)
POTASSIUM SERPL-SCNC: 4.7 MMOL/L (ref 3.5–5.1)
PROT SERPL-MCNC: 8 G/DL (ref 6.4–8.2)
PROT UR STRIP-MCNC: NEGATIVE MG/DL
RBC # BLD AUTO: 4.23 M/UL (ref 4.1–5.7)
RBC #/AREA URNS HPF: ABNORMAL /HPF (ref 0–5)
RBC MORPH BLD: ABNORMAL
SERVICE CMNT-IMP: ABNORMAL
SERVICE CMNT-IMP: ABNORMAL
SERVICE CMNT-IMP: NORMAL
SODIUM SERPL-SCNC: 123 MMOL/L (ref 136–145)
SODIUM SERPL-SCNC: 137 MMOL/L (ref 136–145)
SP GR UR REFRACTOMETRY: 1.03 (ref 1–1.03)
UA: UC IF INDICATED,UAUC: ABNORMAL
UROBILINOGEN UR QL STRIP.AUTO: 0.2 EU/DL (ref 0.2–1)
WBC # BLD AUTO: 13 K/UL (ref 4.1–11.1)
WBC URNS QL MICRO: ABNORMAL /HPF (ref 0–4)

## 2018-03-11 PROCEDURE — 74011636637 HC RX REV CODE- 636/637: Performed by: INTERNAL MEDICINE

## 2018-03-11 PROCEDURE — 74011250636 HC RX REV CODE- 250/636: Performed by: EMERGENCY MEDICINE

## 2018-03-11 PROCEDURE — 80048 BASIC METABOLIC PNL TOTAL CA: CPT | Performed by: INTERNAL MEDICINE

## 2018-03-11 PROCEDURE — 83605 ASSAY OF LACTIC ACID: CPT | Performed by: PHYSICIAN ASSISTANT

## 2018-03-11 PROCEDURE — 74011000250 HC RX REV CODE- 250: Performed by: INTERNAL MEDICINE

## 2018-03-11 PROCEDURE — 74011250637 HC RX REV CODE- 250/637: Performed by: INTERNAL MEDICINE

## 2018-03-11 PROCEDURE — 74011250636 HC RX REV CODE- 250/636: Performed by: PHYSICIAN ASSISTANT

## 2018-03-11 PROCEDURE — 87040 BLOOD CULTURE FOR BACTERIA: CPT | Performed by: PHYSICIAN ASSISTANT

## 2018-03-11 PROCEDURE — 36415 COLL VENOUS BLD VENIPUNCTURE: CPT | Performed by: PHYSICIAN ASSISTANT

## 2018-03-11 PROCEDURE — 86140 C-REACTIVE PROTEIN: CPT | Performed by: EMERGENCY MEDICINE

## 2018-03-11 PROCEDURE — 85025 COMPLETE CBC W/AUTO DIFF WBC: CPT | Performed by: PHYSICIAN ASSISTANT

## 2018-03-11 PROCEDURE — 96374 THER/PROPH/DIAG INJ IV PUSH: CPT

## 2018-03-11 PROCEDURE — 83605 ASSAY OF LACTIC ACID: CPT | Performed by: INTERNAL MEDICINE

## 2018-03-11 PROCEDURE — 74011000258 HC RX REV CODE- 258: Performed by: INTERNAL MEDICINE

## 2018-03-11 PROCEDURE — 80053 COMPREHEN METABOLIC PANEL: CPT | Performed by: PHYSICIAN ASSISTANT

## 2018-03-11 PROCEDURE — 83735 ASSAY OF MAGNESIUM: CPT | Performed by: INTERNAL MEDICINE

## 2018-03-11 PROCEDURE — 74011636320 HC RX REV CODE- 636/320: Performed by: INTERNAL MEDICINE

## 2018-03-11 PROCEDURE — 84100 ASSAY OF PHOSPHORUS: CPT | Performed by: INTERNAL MEDICINE

## 2018-03-11 PROCEDURE — 85652 RBC SED RATE AUTOMATED: CPT | Performed by: EMERGENCY MEDICINE

## 2018-03-11 PROCEDURE — 74011250637 HC RX REV CODE- 250/637: Performed by: PHYSICIAN ASSISTANT

## 2018-03-11 PROCEDURE — 83036 HEMOGLOBIN GLYCOSYLATED A1C: CPT | Performed by: EMERGENCY MEDICINE

## 2018-03-11 PROCEDURE — 81001 URINALYSIS AUTO W/SCOPE: CPT | Performed by: PHYSICIAN ASSISTANT

## 2018-03-11 PROCEDURE — 74011250636 HC RX REV CODE- 250/636: Performed by: INTERNAL MEDICINE

## 2018-03-11 PROCEDURE — 65660000000 HC RM CCU STEPDOWN

## 2018-03-11 PROCEDURE — 82962 GLUCOSE BLOOD TEST: CPT

## 2018-03-11 PROCEDURE — 73701 CT LOWER EXTREMITY W/DYE: CPT

## 2018-03-11 PROCEDURE — 99284 EMERGENCY DEPT VISIT MOD MDM: CPT

## 2018-03-11 PROCEDURE — 73630 X-RAY EXAM OF FOOT: CPT

## 2018-03-11 RX ORDER — OXYCODONE AND ACETAMINOPHEN 5; 325 MG/1; MG/1
1 TABLET ORAL
Status: DISCONTINUED | OUTPATIENT
Start: 2018-03-11 | End: 2018-03-14 | Stop reason: HOSPADM

## 2018-03-11 RX ORDER — INSULIN LISPRO 100 [IU]/ML
INJECTION, SOLUTION INTRAVENOUS; SUBCUTANEOUS
Status: DISCONTINUED | OUTPATIENT
Start: 2018-03-12 | End: 2018-03-12

## 2018-03-11 RX ORDER — SODIUM CHLORIDE 0.9 % (FLUSH) 0.9 %
5-10 SYRINGE (ML) INJECTION EVERY 8 HOURS
Status: DISCONTINUED | OUTPATIENT
Start: 2018-03-11 | End: 2018-03-14 | Stop reason: HOSPADM

## 2018-03-11 RX ORDER — ACETAMINOPHEN 325 MG/1
650 TABLET ORAL
Status: DISCONTINUED | OUTPATIENT
Start: 2018-03-11 | End: 2018-03-14 | Stop reason: HOSPADM

## 2018-03-11 RX ORDER — VANCOMYCIN/0.9 % SOD CHLORIDE 1.5G/250ML
1500 PLASTIC BAG, INJECTION (ML) INTRAVENOUS ONCE
Status: DISCONTINUED | OUTPATIENT
Start: 2018-03-11 | End: 2018-03-11

## 2018-03-11 RX ORDER — ONDANSETRON 2 MG/ML
4 INJECTION INTRAMUSCULAR; INTRAVENOUS
Status: DISCONTINUED | OUTPATIENT
Start: 2018-03-11 | End: 2018-03-14 | Stop reason: HOSPADM

## 2018-03-11 RX ORDER — SODIUM CHLORIDE 9 MG/ML
100 INJECTION, SOLUTION INTRAVENOUS CONTINUOUS
Status: DISCONTINUED | OUTPATIENT
Start: 2018-03-11 | End: 2018-03-11

## 2018-03-11 RX ORDER — MAGNESIUM SULFATE 100 %
4 CRYSTALS MISCELLANEOUS AS NEEDED
Status: DISCONTINUED | OUTPATIENT
Start: 2018-03-11 | End: 2018-03-11

## 2018-03-11 RX ORDER — SODIUM CHLORIDE 0.9 % (FLUSH) 0.9 %
5-10 SYRINGE (ML) INJECTION AS NEEDED
Status: DISCONTINUED | OUTPATIENT
Start: 2018-03-11 | End: 2018-03-11

## 2018-03-11 RX ORDER — INSULIN LISPRO 100 [IU]/ML
INJECTION, SOLUTION INTRAVENOUS; SUBCUTANEOUS
Status: DISCONTINUED | OUTPATIENT
Start: 2018-03-11 | End: 2018-03-11

## 2018-03-11 RX ORDER — DEXTROSE 50 % IN WATER (D50W) INTRAVENOUS SYRINGE
12.5-25 AS NEEDED
Status: DISCONTINUED | OUTPATIENT
Start: 2018-03-11 | End: 2018-03-14 | Stop reason: HOSPADM

## 2018-03-11 RX ORDER — SODIUM CHLORIDE 9 MG/ML
50 INJECTION, SOLUTION INTRAVENOUS
Status: COMPLETED | OUTPATIENT
Start: 2018-03-11 | End: 2018-03-11

## 2018-03-11 RX ORDER — VANCOMYCIN/0.9 % SOD CHLORIDE 1.5G/250ML
1500 PLASTIC BAG, INJECTION (ML) INTRAVENOUS ONCE
Status: COMPLETED | OUTPATIENT
Start: 2018-03-11 | End: 2018-03-11

## 2018-03-11 RX ORDER — SODIUM CHLORIDE 0.9 % (FLUSH) 0.9 %
10 SYRINGE (ML) INJECTION
Status: COMPLETED | OUTPATIENT
Start: 2018-03-11 | End: 2018-03-11

## 2018-03-11 RX ORDER — SODIUM CHLORIDE 0.9 % (FLUSH) 0.9 %
5-10 SYRINGE (ML) INJECTION AS NEEDED
Status: DISCONTINUED | OUTPATIENT
Start: 2018-03-11 | End: 2018-03-14 | Stop reason: HOSPADM

## 2018-03-11 RX ORDER — IBUPROFEN 200 MG
1 TABLET ORAL DAILY
Status: DISCONTINUED | OUTPATIENT
Start: 2018-03-11 | End: 2018-03-14 | Stop reason: HOSPADM

## 2018-03-11 RX ORDER — MORPHINE SULFATE 4 MG/ML
2 INJECTION, SOLUTION INTRAMUSCULAR; INTRAVENOUS
Status: DISCONTINUED | OUTPATIENT
Start: 2018-03-11 | End: 2018-03-13

## 2018-03-11 RX ORDER — MAGNESIUM SULFATE 100 %
4 CRYSTALS MISCELLANEOUS AS NEEDED
Status: DISCONTINUED | OUTPATIENT
Start: 2018-03-11 | End: 2018-03-14 | Stop reason: HOSPADM

## 2018-03-11 RX ORDER — LABETALOL HYDROCHLORIDE 5 MG/ML
10 INJECTION, SOLUTION INTRAVENOUS
Status: DISCONTINUED | OUTPATIENT
Start: 2018-03-11 | End: 2018-03-14 | Stop reason: HOSPADM

## 2018-03-11 RX ORDER — SODIUM CHLORIDE 0.9 % (FLUSH) 0.9 %
5-10 SYRINGE (ML) INJECTION EVERY 8 HOURS
Status: DISCONTINUED | OUTPATIENT
Start: 2018-03-11 | End: 2018-03-11

## 2018-03-11 RX ORDER — DEXTROSE 50 % IN WATER (D50W) INTRAVENOUS SYRINGE
12.5-25 AS NEEDED
Status: DISCONTINUED | OUTPATIENT
Start: 2018-03-11 | End: 2018-03-11

## 2018-03-11 RX ORDER — ENOXAPARIN SODIUM 100 MG/ML
40 INJECTION SUBCUTANEOUS EVERY 24 HOURS
Status: DISCONTINUED | OUTPATIENT
Start: 2018-03-11 | End: 2018-03-14 | Stop reason: HOSPADM

## 2018-03-11 RX ORDER — DEXTROSE MONOHYDRATE AND SODIUM CHLORIDE 5; .9 G/100ML; G/100ML
100 INJECTION, SOLUTION INTRAVENOUS CONTINUOUS
Status: DISCONTINUED | OUTPATIENT
Start: 2018-03-11 | End: 2018-03-12

## 2018-03-11 RX ORDER — OXYCODONE AND ACETAMINOPHEN 5; 325 MG/1; MG/1
1 TABLET ORAL
Status: COMPLETED | OUTPATIENT
Start: 2018-03-11 | End: 2018-03-11

## 2018-03-11 RX ORDER — ENOXAPARIN SODIUM 100 MG/ML
40 INJECTION SUBCUTANEOUS EVERY 24 HOURS
Status: DISCONTINUED | OUTPATIENT
Start: 2018-03-11 | End: 2018-03-11

## 2018-03-11 RX ADMIN — IOPAMIDOL 100 ML: 755 INJECTION, SOLUTION INTRAVENOUS at 20:06

## 2018-03-11 RX ADMIN — Medication 10 ML: at 20:06

## 2018-03-11 RX ADMIN — Medication 10 ML: at 21:52

## 2018-03-11 RX ADMIN — Medication 10 ML: at 21:54

## 2018-03-11 RX ADMIN — SODIUM CHLORIDE 1000 ML: 900 INJECTION, SOLUTION INTRAVENOUS at 18:26

## 2018-03-11 RX ADMIN — PIPERACILLIN SODIUM AND TAZOBACTAM SODIUM 3.38 G: .375; 3 INJECTION, POWDER, LYOPHILIZED, FOR SOLUTION INTRAVENOUS at 18:45

## 2018-03-11 RX ADMIN — SODIUM CHLORIDE 50 ML/HR: 900 INJECTION, SOLUTION INTRAVENOUS at 20:06

## 2018-03-11 RX ADMIN — DEXTROSE MONOHYDRATE 25 G: 500 INJECTION PARENTERAL at 21:54

## 2018-03-11 RX ADMIN — ENOXAPARIN SODIUM 40 MG: 40 INJECTION SUBCUTANEOUS at 20:38

## 2018-03-11 RX ADMIN — OXYCODONE HYDROCHLORIDE AND ACETAMINOPHEN 1 TABLET: 5; 325 TABLET ORAL at 17:31

## 2018-03-11 RX ADMIN — SODIUM CHLORIDE 0.8 UNITS/HR: 900 INJECTION, SOLUTION INTRAVENOUS at 20:56

## 2018-03-11 RX ADMIN — VANCOMYCIN HYDROCHLORIDE 1500 MG: 10 INJECTION, POWDER, LYOPHILIZED, FOR SOLUTION INTRAVENOUS at 20:38

## 2018-03-11 RX ADMIN — DEXTROSE MONOHYDRATE AND SODIUM CHLORIDE 100 ML/HR: 5; .9 INJECTION, SOLUTION INTRAVENOUS at 21:11

## 2018-03-11 RX ADMIN — OXYCODONE HYDROCHLORIDE AND ACETAMINOPHEN 1 TABLET: 5; 325 TABLET ORAL at 21:54

## 2018-03-11 RX ADMIN — SODIUM CHLORIDE 6.8 UNITS/HR: 900 INJECTION, SOLUTION INTRAVENOUS at 19:38

## 2018-03-11 NOTE — ED PROVIDER NOTES
EMERGENCY DEPARTMENT HISTORY AND PHYSICAL EXAM      Date: 3/11/2018  Patient Name: Germania Bernal    History of Presenting Illness     Chief Complaint   Patient presents with    Wound Check     stepped on nail 1 week ago and was seen in ED on Friday and prescribed ABX but reports worsening of wound       History Provided By: Patient    HPI: Germania Bernal, 30 y.o. male with PMHx significant for DM and MRSA, presents ambulatory to the ED for evaluation of a gradually worsening puncture wound to the bottom of his left foot. Per review of old records, the puncture wound initially occurred on 3/4/18 when the patient stepped on a nail. The patient went to this ED for evaluation of the wound due to worsening redness and swelling where he was evaluated and prescribed Levaquin and Doxycycline. Today, patient states he came back in for re-evaluation because he is having worsening redness and swelling and continued pain around his left foot puncture wound. Patient admits to tobacco abuse. He denies any additional associated symptoms at this time. PCP: Stephanie Agosto MD    There are no other complaints, changes, or physical findings at this time.     Current Facility-Administered Medications   Medication Dose Route Frequency Provider Last Rate Last Dose    sodium chloride (NS) flush 5-10 mL  5-10 mL IntraVENous [address], [place]        sodium chloride (NS) flush 5-10 mL  5-10 mL IntraVENous PRN [facility], [place]        sodium chloride 0.9 % bolus infusion 1,000 mL  1,000 mL IntraVENous ONCE [facility] [place], [place] 1,000 mL/hr at 03/11/18 1826 1,000 mL at 03/11/18 1826    sodium chloride 0.9 % bolus infusion 1,000 mL  1,000 mL IntraVENous ONCE [facility] [place], [place] 1,000 mL/hr at 03/11/18 1826 1,000 mL at 03/11/18 1826    vancomycin (VANCOCIN) 1500 mg in  ml infusion  1,500 mg IntraVENous ONCE Gokul Odonnell DO        piperacillin-tazobactam (ZOSYN) 3.375 g in  ml premix/cmpd  3.375 g IntraVENous NOW Yury Parada Noelle Coelho,         insulin regular (NOVOLIN R, HUMULIN R) 100 Units in 0.9% sodium chloride 100 mL infusion  0-50 Units/hr IntraVENous TITRATE Hazel Zelaya DO        insulin lispro (HUMALOG) injection   SubCUTAneous TIDAC Hazel Zelaya DO        glucose chewable tablet 16 g  4 Tab Oral PRN Hazel Zelaya DO        dextrose (D50W) injection syrg 12.5-25 g  12.5-25 g IntraVENous PRN Hazel Zelaya DO        glucagon (GLUCAGEN) injection 1 mg  1 mg IntraMUSCular PRN Hazel Zelaya DO         Current Outpatient Prescriptions   Medication Sig Dispense Refill    levoFLOXacin (LEVAQUIN) 750 mg tablet Take 1 Tab by mouth daily for 7 days. 7 Tab 0    doxycycline (VIBRA-TABS) 100 mg tablet Take 1 Tab by mouth two (2) times a day for 7 days. 14 Tab 0    ibuprofen (MOTRIN) 800 mg tablet Take 1 Tab by mouth every eight (8) hours as needed for Pain. 30 Tab 0    insulin regular (NOVOLIN R) 100 unit/mL injection by SubCUTAneous route as needed (>200 give 6 units, >300 give 8 units, >400 give 10 units).  insulin glargine (LANTUS) 100 unit/mL injection 46 Units by SubCUTAneous route daily.        Past History     Past Medical History:  Past Medical History:   Diagnosis Date    B[facility] 1 disorder, depressed ([facility] [address]     Bipolar disorder ([facility] [address]     Depression     Diabetes ([facility] [facility]     DKA, type 1 ([facility] [address] 1/27/2013    diagnosed age 23    H/O noncompliance with medical treatment, presenting hazards to health     MRSA (methicillin resistant staph aureus) culture positive     MRSA (methicillin resistant Staphylococcus aureus)     Face    Noncompliance with medication regimen     Second hand smoke exposure     Seizure ([address]     Seizures ([address] 2006 or 2007    one episode during prison       Past Surgical History:  Past Surgical History:   Procedure Laterality Date    HX HEENT      top left wisdom tooth    HX ORTHOPAEDIC Left     wrist; MCV       Family History:  Family History   Problem Relation Age of Onset    Diabetes Other skinny, type 1        Social History:  Social History   Substance Use Topics    Smoking status: Current Every Day Smoker     Packs/day: 1.00     Years: 16.00     Types: Cigarettes    Smokeless tobacco: Never Used    Alcohol use No       Allergies:  No Known Allergies    Review of Systems   Review of Systems   Constitutional: Negative for chills and fever. HENT: Negative for congestion, rhinorrhea and sore throat. Eyes: Negative for photophobia and visual disturbance. Respiratory: Negative for cough and shortness of breath. Cardiovascular: Negative for chest pain and palpitations. Gastrointestinal: Negative for abdominal pain, diarrhea, nausea and vomiting. Endocrine: Negative for polydipsia, polyphagia and polyuria. Genitourinary: Negative for dysuria and hematuria. Musculoskeletal: Negative for neck pain and neck stiffness. Skin: Negative for rash and wound. Positive for redness, swelling, and pain around left foot puncture wound. No fluctuance/drainage. Allergic/Immunologic: Positive for immunocompromised state. Negative for food allergies. Neurological: Negative for dizziness, weakness and headaches. Psychiatric/Behavioral: Negative for agitation and confusion. Physical Exam   Physical Exam   Constitutional: He is oriented to person, place, and time. He appears well-developed and well-nourished. No distress. WDWN male, alert, in NAD   HENT:   Head: Normocephalic and atraumatic. Nose: Nose normal.   Mouth/Throat: No oropharyngeal exudate. Eyes: Conjunctivae and EOM are normal. Right eye exhibits no discharge. Left eye exhibits no discharge. No scleral icterus. Neck: Normal range of motion. Neck supple. No JVD present. No tracheal deviation present. No thyromegaly present. Cardiovascular: Normal rate, regular rhythm and normal heart sounds. Pulmonary/Chest: Effort normal and breath sounds normal. No respiratory distress. He has no wheezes. Abdominal: Soft.  He exhibits no distension. There is no tenderness. There is no rebound and no guarding. Musculoskeletal: He exhibits edema and tenderness. See SKIN exam   Lymphadenopathy:     He has no cervical adenopathy. Neurological: He is alert and oriented to person, place, and time. He exhibits normal muscle tone. Coordination normal.   Skin: Skin is warm and dry. He is not diaphoretic. There is erythema. Distal L foot with erythema, edema, and tenderness noted to plantar aspect of foot surrounding puncture wound as well as the base of the 3rd and 4th toes. No fluctuance/drainage noted. 2+ dp pulse, NVI, sensation grossly intact to light touch. Ambulatory with tenderness noted with weight bearing. Psychiatric: He has a normal mood and affect. His behavior is normal. Judgment normal.   Nursing note and vitals reviewed. Diagnostic Study Results     Labs -     Recent Results (from the past 12 hour(s))   CBC WITH AUTOMATED DIFF    Collection Time: 03/11/18  4:53 PM   Result Value Ref Range    WBC 13.0 (H) 4.1 - 11.1 K/uL    RBC 4.23 4. 10 - 5.70 M/uL    HGB 13.7 12.1 - 17.0 g/dL    HCT 39.9 36.6 - 50.3 %    MCV 94.3 80.0 - 99.0 FL    MCH 32.4 26.0 - 34.0 PG    MCHC 34.3 30.0 - 36.5 g/dL    RDW 11.9 11.5 - 14.5 %    PLATELET 789 084 - 587 K/uL    MPV 10.8 8.9 - 12.9 FL    NRBC 0.0 0  WBC    ABSOLUTE NRBC 0.00 0.00 - 0.01 K/uL    NEUTROPHILS 85 (H) 32 - 75 %    LYMPHOCYTES 6 (L) 12 - 49 %    MONOCYTES 7 5 - 13 %    EOSINOPHILS 1 0 - 7 %    BASOPHILS 0 0 - 1 %    IMMATURE GRANULOCYTES 1 (H) 0.0 - 0.5 %    ABS. NEUTROPHILS 11.1 (H) 1.8 - 8.0 K/UL    ABS. LYMPHOCYTES 0.8 0.8 - 3.5 K/UL    ABS. MONOCYTES 0.9 0.0 - 1.0 K/UL    ABS. EOSINOPHILS 0.1 0.0 - 0.4 K/UL    ABS. BASOPHILS 0.0 0.0 - 0.1 K/UL    ABS. IMM.  GRANS. 0.1 (H) 0.00 - 0.04 K/UL    DF SMEAR SCANNED      RBC COMMENTS NORMOCYTIC, NORMOCHROMIC     METABOLIC PANEL, COMPREHENSIVE    Collection Time: 03/11/18  4:53 PM   Result Value Ref Range    Sodium 123 (L) 136 - 145 mmol/L    Potassium 4.7 3.5 - 5.1 mmol/L    Chloride 89 (L) 97 - 108 mmol/L    CO2 29 21 - 32 mmol/L    Anion gap 5 5 - 15 mmol/L    Glucose 731 (HH) 65 - 100 mg/dL    BUN 10 6 - 20 MG/DL    Creatinine 1.18 0.70 - 1.30 MG/DL    BUN/Creatinine ratio 8 (L) 12 - 20      GFR est AA >60 >60 ml/min/1.73m2    GFR est non-AA >60 >60 ml/min/1.73m2    Calcium 9.2 8.5 - 10.1 MG/DL    Bilirubin, total 0.4 0.2 - 1.0 MG/DL    ALT (SGPT) 32 12 - 78 U/L    AST (SGOT) 17 15 - 37 U/L    Alk. phosphatase 210 (H) 45 - 117 U/L    Protein, total 8.0 6.4 - 8.2 g/dL    Albumin 3.3 (L) 3.5 - 5.0 g/dL    Globulin 4.7 (H) 2.0 - 4.0 g/dL    A-G Ratio 0.7 (L) 1.1 - 2.2     URINALYSIS W/ REFLEX CULTURE    Collection Time: 03/11/18  5:23 PM   Result Value Ref Range    Color YELLOW/STRAW      Appearance CLEAR CLEAR      Specific gravity 1.030 1.003 - 1.030      pH (UA) 5.5 5.0 - 8.0      Protein NEGATIVE  NEG mg/dL    Glucose >1000 (A) NEG mg/dL    Ketone NEGATIVE  NEG mg/dL    Bilirubin NEGATIVE  NEG      Blood TRACE (A) NEG      Urobilinogen 0.2 0.2 - 1.0 EU/dL    Nitrites NEGATIVE  NEG      Leukocyte Esterase NEGATIVE  NEG      UA:UC IF INDICATED CULTURE NOT INDICATED BY UA RESULT CNI      WBC 0-4 0 - 4 /hpf    RBC 0-5 0 - 5 /hpf    Epithelial cells FEW FEW /lpf    Bacteria NEGATIVE  NEG /hpf    Hyaline cast 0-2 0 - 5 /lpf       Radiologic Studies -   XR FOOT LT MIN 3 V   Final Result          EXAM:  XR FOOT LT MIN 3 V     INDICATION:   Trauma. Puncture wound.     COMPARISON:  Radiographs 3/9/2018     FINDINGS:  Three views of the left foot demonstrate no demonstration of  radiographically apparent foreign body or soft tissue gas. .  No bone or joint  abnormality is demonstrated. There is anatomic alignment     IMPRESSION  IMPRESSION:  No acute abnormality. No radiographically apparent foreign body  demonstrated. Medical Decision Making   I am the first provider for this patient.     I reviewed the vital signs, available nursing notes, past medical history, past surgical history, family history and social history. Vital Signs-Reviewed the patient's vital signs. Patient Vitals for the past 12 hrs:   Temp Pulse Resp BP SpO2   03/11/18 1700 - (!) 109 16 (!) 144/94 100 %   03/11/18 1607 98.5 °F (36.9 °C) (!) 120 16 148/82 100 %     Records Reviewed: Nursing Notes and Old Medical Records    Provider Notes (Medical Decision Making):   Cellulitis, uncontrolled DM, electrolyte dysfunction    ED Course:   Initial assessment performed. The patients presenting problems have been discussed, and they are in agreement with the care plan formulated and outlined with them. I have encouraged them to ask questions as they arise throughout their visit. Progress Note:  6:47 PM  Dr Veronica Mittal will admit. Written by LUIS E Jimenez, for Jocelin Ferguson DO.     CRITICAL CARE NOTE :    6:46 PM  IMPENDING DETERIORATION -Metabolic  ASSOCIATED RISK FACTORS - Hypotension, Shock and Metabolic changes  MANAGEMENT- Bedside Assessment  INTERPRETATION -  Blood Pressure and blood sugar, electrolytes  INTERVENTIONS - hemodynamic mngmt and Metobolic interventions  CASE REVIEW - Hospitalist and Family, nursing  TREATMENT RESPONSE -Stable  PERFORMED BY - Self and Physician    NOTES   :  I have spent 60 minutes of critical care time involved in lab review, consultations with specialist, family decision- making, bedside attention and documentation. During this entire length of time I was immediately available to the patient . Disposition:  Admit Note:  6:50 PM  Pt is being admitted by Dr Veronica Mittal. The results of their tests and reason(s) for their admission have been discussed with pt and/or available family. They convey agreement and understanding for the need to be admitted and for admission diagnosis. PLAN:  1. Admission    Diagnosis     Clinical Impression:   1. Cellulitis of left foot    2. Puncture wound    3.  Uncontrolled type 2 diabetes mellitus without complication, with long-term current use of insulin ([address]    4. H/O medication noncompliance    5. Tobacco dependence        Attestations: This note is prepared by Sloan March, acting as Scribe for [employer]: The scribe's documentation has been prepared under my direction and personally reviewed by me in its entirety. I confirm that the note above accurately reflects all work, treatment, procedures, and medical decision making performed by me.

## 2018-03-11 NOTE — IP AVS SNAPSHOT
[address] [facility] 
[phone] Patient: Carolynn Medel MRN: [id number] DCL:[id number] About your hospitalization You were admitted on:  March 11, 2018 You last received care in the:  [facility] PROGRESSIVE CARE You were discharged on:  March 14, 2018 Why you were hospitalized Your primary diagnosis was:  Not on File Your diagnoses also included:  Sepsis (Hcc), Cellulitis Of Left Foot, Htn (Hypertension), Hyponatremia, Dm (Diabetes Mellitus) (Hcc) Follow-up Information Follow up With Details Comments Contact Info Stephanie Agosto MD   Patient can only remember the practice name and not the physician 
  
 Luz Elena Coffman MD Go on 3/29/2018 For hospital follow up appointment at 10:30AM [address] [address] 
[phone] Discharge Orders None A check wesley indicates which time of day the medication should be taken. My Medications START taking these medications Instructions Each Dose to Equal  
 Morning Noon Evening Bedtime  
 amoxicillin-clavulanate 875-125 mg per tablet Commonly known as:  AUGMENTIN Take 1 Tab by mouth every twelve (12) hours for 12 days. 1 Tab  
    
   
   
3/14/18  
   
  
 ondansetron 4 mg disintegrating tablet Commonly known as:  ZOFRAN ODT Notes to Patient:  [address] Place one tab under tongue every 6 hours as needed for nausea  
     
   
   
   
  
 oxyCODONE-acetaminophen 5-325 mg per tablet Commonly known as:  PERCOCET Notes to Patient:  TAKE AS NEEDED  [address] Take 1 Tab by mouth every four (4) hours as needed. Max Daily Amount: 6 Tabs. 1 Tab CONTINUE taking these medications Instructions Each Dose to Equal  
 Morning Noon Evening Bedtime  
 ibuprofen 800 mg tablet Commonly known as:  MOTRIN  
 Notes to Patient:  MAY TAKE AS NEEDED AT HOME Take 1 Tab by mouth every eight (8) hours as needed for Pain. 800 mg  
    
   
   
   
  
 LANTUS U-100 INSULIN 100 unit/mL injection Generic drug:  insulin glargine 46 Units by SubCUTAneous route daily. 46 Units 3/14/18 NovoLIN R Regular U-100 Insuln 100 unit/mL injection Generic drug:  insulin regular Notes to Patient:  TAKE AS PER HOME REGIMEN  
   
 by SubCUTAneous route as needed (>200 give 6 units, >300 give 8 units, >400 give 10 units). STOP taking these medications   
 doxycycline 100 mg tablet Commonly known as:  VIBRA-TABS  
   
  
 levoFLOXacin 750 mg tablet Commonly known as:  Casimirophylicia Paige Where to Get Your Medications Information on where to get these meds will be given to you by the nurse or doctor. ! Ask your nurse or doctor about these medications  
  amoxicillin-clavulanate 875-125 mg per tablet  
 ondansetron 4 mg disintegrating tablet  
 oxyCODONE-acetaminophen 5-325 mg per tablet Discharge Instructions Please make an appointment with the foot doctor, call [phone] when discharged Puncture Wounds: Care Instructions Your Care Instructions A puncture wound can happen anywhere on your body. These wounds tend to be narrower and deeper than cuts. A puncture wound is usually left open instead of being closed. This is because a puncture wound can be easily infected, and closing it can make infection even more likely. You will probably have a bandage over the wound. The doctor has checked you carefully, but problems can develop later. If you notice any problems or new symptoms, get medical treatment right away. Follow-up care is a key part of your treatment and safety. Be sure to make and go to all appointments, and call your doctor if you are having problems.  It's also a good idea to know your test results and keep a list of the medicines you take. How can you care for yourself at home? · Keep the wound dry for the first 24 to 48 hours. After this, you can shower if your doctor okays it. Pat the wound dry. · Don't soak the wound, such as in a bathtub. Your doctor will tell you when it's safe to get the wound wet. · If your doctor told you how to care for your wound, follow your doctor's instructions. If you did not get instructions, follow this general advice: ¨ After the first 24 to 48 hours, wash the wound with clean water 2 times a day. Don't use hydrogen peroxide or alcohol, which can slow healing. ¨ You may cover the wound with a thin layer of petroleum jelly, such as Vaseline, and a nonstick bandage. ¨ Apply more petroleum jelly and replace the bandage as needed. · Prop up the sore area on pillows anytime you sit or lie down during the next 3 days. Try to keep it above the level of your heart. This helps reduce swelling. · Avoid any activity that could cause your wound to get worse. · Be safe with medicines. Read and follow all instructions on the label. ¨ If the doctor gave you a prescription medicine for pain, take it as prescribed. ¨ If you are not taking a prescription pain medicine, ask your doctor if you can take an over-the-counter medicine. · If your doctor prescribed antibiotics, take them as directed. Do not stop taking them just because you feel better. You need to take the full course of antibiotics. When should you call for help? Call your doctor now or seek immediate medical care if: 
? · You have new pain, or your pain gets worse. ? · The wound starts to bleed, and blood soaks through the bandage. Oozing small amounts of blood is normal.  
? · The skin near the wound is cold or pale or changes color. ? · You have tingling, weakness, or numbness near the wound. ? · You have trouble moving the area near the wound. ? · You have symptoms of infection, such as: ¨ Increased pain, swelling, warmth, or redness around the wound. ¨ Red streaks leading from the wound. ¨ Pus draining from the wound. ¨ A fever. ? Watch closely for changes in your health, and be sure to contact your doctor if: 
? · The wound is not closing (getting smaller). ? · You do not get better as expected. Where can you learn more? Go to http://dwaine-sanjeev.info/. Enter H951 in the search box to learn more about \"Puncture Wounds: Care Instructions. \" Current as of: March 20, 2017 Content Version: 11.4 © 7467-9623 [employer]. Care instructions adapted under license by [employer] (which disclaims liability or warranty for this information). If you have questions about a medical condition or this instruction, always ask your healthcare professional. [address] any warranty or liability for your use of this information. [employer] Announcement We are excited to announce that we are making your provider's discharge notes available to you in Hexagram [employer]. You will see these notes when they are completed and signed by the physician that discharged you from your recent hospital stay. If you have any questions or concerns about any information you see in [employer], please call the Health Information Department where you were seen or reach out to your Primary Care Provider for more information about your plan of care. Introducing [facility] [place] [facility]! Dear Mendez Sheehan: 
Thank you for requesting a [employer] account. Our records indicate that you already have an active [employer] account. You can access your account anytime at [email or web address] Did you know that you can access your hospital and ER discharge instructions at any time in Hexagram 49? You can also review all of your test results from your hospital stay or ER visit. Additional Information If you have questions, please visit the Frequently Asked Questions section of the MyChart website at [email or web address] Remember, MyChart is NOT to be used for urgent needs. For medical emergencies, dial 911. Now available from your iPhone and Android! Unresulted Labs-Please follow up with your PCP about these lab tests Order Current Status CULTURE, BLOOD, PAIRED Preliminary result CULTURE, WOUND W GRAM STAIN Preliminary result Providers Seen During Your Hospitalization Provider Specialty Primary office phone London Rodriges MD Emergency Medicine [phone] Hazel Zelaya DO Emergency Medicine [phone] Geoff Dietrich MD Internal Medicine [phone] Charan García MD Internal Medicine [phone] Your Primary Care Physician (PCP) Primary Care Physician Office Phone Office Fax OTHER, PHYS ** None ** ** None ** You are allergic to the following No active allergies Recent Documentation Height Weight BMI Smoking Status 1.727 m 63.8 kg 21.39 kg/m2 Current Every Day Smoker Emergency Contacts Name Discharge Info Relation Home Work Mobile SerigoKhadijah DISCHARGE CAREGIVER [3] Mother [14] [phone] Patient Belongings The following personal items are in your possession at time of discharge: 
  Dental Appliances: None         Home Medications: Sent home   Jewelry: Necklace, With patient, Sent home  Clothing: Pants, Shirt, With patient    Other Valuables: Cell Phone, With patient, Sent home Please provide this summary of care documentation to your next provider. Signatures-by signing, you are acknowledging that this After Visit Summary has been reviewed with you and you have received a copy. Patient Signature:  ____________________________________________________________ Date:  ____________________________________________________________ `    
    
 Provider Signature:  ____________________________________________________________ Date:  ____________________________________________________________

## 2018-03-11 NOTE — ED NOTES
Assumed care of pt from [facility]. Pt resting quietly and in no acute distress at this time. VSS. Call bell within reach.

## 2018-03-11 NOTE — PROGRESS NOTES
Pharmacy Automatic Renal Dosing Protocol - Antimicrobials    Indication for Antimicrobials: SSTI     Current Regimen of Each Antimicrobial:  Zosyn 3.375 gm IV every 8 hr (Start Date 3/11; Day # 1)  Vancomycin 1500 mg once then 1000 mg every 12 hr (Start Date 3/11; Day # 1)    Goal Level: VANCOMYCIN TROUGH GOAL RANGE    Vancomycin Trough: 10 - 15 mcg/mL    Measured / Extrapolated Vancomycin Level:projected trough 14    Significant Cultures:   Blood 3/11 - pending    Paralysis, amputations, malnutrition: no    Labs:  Recent Labs      18   1653   CREA  1.18   BUN  10   WBC  13.0*     Temp (24hrs), Av.5 °F (36.9 °C), Min:98.5 °F (36.9 °C), Max:98.5 °F (36.9 °C)      Creatinine Clearance (mL/min) or Dialysis: 80  No results found for: PCT    Impression/Plan:   · Vancomycin and zosyn appropriate for indication and renally dosed     Pharmacy will follow daily and adjust medications as appropriate for renal function and/or serum levels. Thank you,  Alivia Marcos, PHARMD          Recommended duration of therapy  [email or web address]/Pharmacy/[email or web address]%[email or web address]/[email or web address]%[email or web address]. docx    Renal Dosing  [email or web address]/[place]/[facility]/Pharmacy/Clinical%20Companion/Renal%[email or web address]. pdf

## 2018-03-11 NOTE — H&P
Hospitalist Admission Note    NAME: Darion Mayer   :  1980   MRN:  [id number]     Date/Time:  3/11/2018 7:03 PM    Patient PCP: Stephanie Agosto MD  ______________________________________________________________________  Given the patient's current clinical presentation, I have a high level of concern for decompensation if discharged from the emergency department. Complex decision making was performed, which includes reviewing the patient's available past medical records, laboratory results, and x-ray films. My assessment of this patient's clinical condition and my plan of care is as follows. Assessment / Plan:  Sepsis POA: tachycardia, leukocytosis, start IVF and ABX. Left Foot wound Infection/Cellulitis: will check CT to r/o abscess, start Zosyn/Vancomycin, get Podiatry evaluation. HTN: on no meds, use labetalol prn. DM/HONKS: no ketones, no gap, but  and hyponatremia, will start insulin drip and monitor in stepdown, check HBA1C. Hyponatremia: is pseudohyponatremia due to high osmolar load, will c/w IVF and monitor. H/O Bipolar Ds: as per record but not on any meds, monitor. Smoker: counseled, start nicotine patch. Code Status: Full Code  Surrogate Decision Maker: Anny Hernandez [phone]  DVT Prophylaxis: lovenox  GI Prophylaxis: not indicated  Baseline: Independent      Subjective:   CHIEF COMPLAINT: \"I step on a nail\"    HISTORY OF PRESENT ILLNESS:     Jay Eason is a 30 y.o.  male  with PMHx significant for DM and MRSA, presents ambulatory to the ED for evaluation of a gradually worsening puncture wound to the bottom of his left foot. Per review of old records, the puncture wound initially occurred on 3/4/18 when the patient stepped on a nail. The patient went to this ED for evaluation of the wound with worsening redness and was evaluated and prescribed Levaquin and Doxycycline.  Today, patient states he came back in for reevaluation because he is having worsening redness and swelling and continued pain around his left foot puncture wound. Patient admits to tobacco abuse. He denies any additional associated symptoms at this time. At this time patient is lying in bed c/o pain, swelling, erythema in left foot, feeling weak and having malaise, denies chest pain, no SOB, no fever, no N/V no diarrhea, no urinary symptoms, no other associated symptoms. We were asked to admit for work up and evaluation of the above problems. Past Medical History:   Diagnosis Date    [facility] 1 disorder, depressed [facility]     Bipolar disorder ([facility]     Depression     Diabetes ([facility] [facility]     [facility], [facility]2013    diagnosed age 23    H/O noncompliance with medical treatment, presenting hazards to health     MRSA (methicillin resistant staph aureus) culture positive     MRSA (methicillin resistant Staphylococcus aureus)     Face    Noncompliance with medication regimen     Second hand smoke exposure     Seizure ([facility] [facility]     Seizures ([facility] 2006 or 2007    one episode during correction        Past Surgical History:   Procedure Laterality Date    HX HEENT      top left wisdom tooth    HX ORTHOPAEDIC Left     wrist; [facility]       Social History   Substance Use Topics    Smoking status: Current Every Day Smoker     Packs/day: 1.00     Years: 16.00     Types: Cigarettes    Smokeless tobacco: Never Used    Alcohol use No        Family History   Problem Relation Age of Onset    Diabetes Mother     Diabetes Other      neice, type 1      No Known Allergies     Prior to Admission medications    Medication Sig Start Date End Date Taking? Authorizing Provider   levoFLOXacin (LEVAQUIN) 750 mg tablet Take 1 Tab by mouth daily for 7 days. 3/9/18 3/16/18 Yes Danielle Wilkerson PA-C   doxycycline (VIBRA-TABS) 100 mg tablet Take 1 Tab by mouth two (2) times a day for 7 days.  3/9/18 3/16/18 Yes Danielle Wilkerson PA-C   ibuprofen (MOTRIN) 800 mg tablet Take 1 Tab by mouth every eight (8) hours as needed for Pain. 3/9/18  Yes Xiomara Roldan PA-C   insulin regular (NOVOLIN R) 100 unit/mL injection by SubCUTAneous route as needed (>200 give 6 units, >300 give 8 units, >400 give 10 units). Yes Historical Provider   insulin glargine (LANTUS) 100 unit/mL injection 46 Units by SubCUTAneous route daily. Yes Historical Provider       REVIEW OF SYSTEMS:     I am not able to complete the review of systems because:    The patient is intubated and sedated    The patient has altered mental status due to his acute medical problems    The patient has baseline aphasia from prior stroke(s)    The patient has baseline dementia and is not reliable historian    The patient is in acute medical distress and unable to provide information           Total of 12 systems reviewed as follows:       POSITIVE= underlined text  Negative = text not underlined  General:  fever, chills, sweats, generalized weakness, weight loss/gain,      loss of appetite   Eyes:    blurred vision, eye pain, loss of vision, double vision  ENT:    rhinorrhea, pharyngitis   Respiratory:   cough, sputum production, SOB, [place], wheezing, pleuritic pain   Cardiology:   chest pain, palpitations, orthopnea, PND, edema, syncope   Gastrointestinal:  abdominal pain , N/V, diarrhea, dysphagia, constipation, bleeding   Genitourinary:  frequency, urgency, dysuria, hematuria, incontinence   Muskuloskeletal :  arthralgia, myalgia, back pain  Hematology:  easy bruising, nose or gum bleeding, lymphadenopathy   Dermatological: rash, ulceration, pruritis, color change / jaundice  Endocrine:   hot flashes or polydipsia   Neurological:  headache, dizziness, confusion, focal weakness, paresthesia,     Speech difficulties, memory loss, gait difficulty  Psychological: Feelings of anxiety, depression, agitation    Objective:   VITALS:    Visit Vitals    BP (!) 144/94    Pulse (!) 109    Temp 98.5 °F (36.9 °C)    Resp 16    Ht 5' 8\" (1.727 m)    Wt 63.8 kg (140 lb 10.5 oz)  SpO2 100%    BMI 21.39 kg/m2       PHYSICAL EXAM:    General:    Alert, cooperative, no distress, appears stated age. HEENT: Atraumatic, anicteric sclerae, pink conjunctivae     No oral ulcers, mucosa moist, throat clear, dentition fair  Neck:  Supple, symmetrical,  thyroid: non tender  Lungs:   Clear to auscultation bilaterally. No Wheezing or Rhonchi. No rales. Chest wall:  No tenderness  No Accessory muscle use. Heart:   Regular  rhythm,  No  murmur   + edema left foot  Abdomen:   Soft, non-tender. Not distended. Bowel sounds normal  Extremities: No cyanosis. No clubbing,      Skin turgor normal, Capillary refill normal, Radial  pulse 2+  Skin:     Not pale. Not Jaundiced  +  rashes and wound in left foot plantar aspect  Psych:  Good insight. Not depressed. Not anxious or agitated. Neurologic: EOMs intact. No facial asymmetry. No aphasia or slurred speech. Symmetrical strength, Sensation grossly intact. Alert and oriented X 4.     _______________________________________________________________________  Care Plan discussed with:    Comments   Patient y    Family  y fiancee   RN y    Care Manager                    Consultant:      _______________________________________________________________________  Expected  Disposition:   Home with Family y   HH/PT/OT/RN    SNF/LTC    CATHIE    ________________________________________________________________________  TOTAL TIME:   Minutes    Critical Care Provided   61  Minutes non procedure based      Comments    y Reviewed previous records   >50% of visit spent in counseling and coordination of care y Discussion with patient and/or family and questions answered       ________________________________________________________________________  Signed: Benja Peraza MD    Procedures: see electronic medical records for all procedures/Xrays and details which were not copied into this note but were reviewed prior to creation of Plan.     LAB DATA REVIEWED: Recent Results (from the past 24 hour(s))   CBC WITH AUTOMATED DIFF    Collection Time: 03/11/18  4:53 PM   Result Value Ref Range    WBC 13.0 (H) 4.1 - 11.1 K/uL    RBC 4.23 4. 10 - 5.70 M/uL    HGB 13.7 12.1 - 17.0 g/dL    HCT 39.9 36.6 - 50.3 %    MCV 94.3 80.0 - 99.0 FL    MCH 32.4 26.0 - 34.0 PG    MCHC 34.3 30.0 - 36.5 g/dL    RDW 11.9 11.5 - 14.5 %    PLATELET 213 338 - 490 K/uL    MPV 10.8 8.9 - 12.9 FL    NRBC 0.0 0  WBC    ABSOLUTE NRBC 0.00 0.00 - 0.01 K/uL    NEUTROPHILS 85 (H) 32 - 75 %    LYMPHOCYTES 6 (L) 12 - 49 %    MONOCYTES 7 5 - 13 %    EOSINOPHILS 1 0 - 7 %    BASOPHILS 0 0 - 1 %    IMMATURE GRANULOCYTES 1 (H) 0.0 - 0.5 %    ABS. NEUTROPHILS 11.1 (H) 1.8 - 8.0 K/UL    ABS. LYMPHOCYTES 0.8 0.8 - 3.5 K/UL    ABS. MONOCYTES 0.9 0.0 - 1.0 K/UL    ABS. EOSINOPHILS 0.1 0.0 - 0.4 K/UL    ABS. BASOPHILS 0.0 0.0 - 0.1 K/UL    ABS. IMM. GRANS. 0.1 (H) 0.00 - 0.04 K/UL    DF SMEAR SCANNED      RBC COMMENTS NORMOCYTIC, NORMOCHROMIC     METABOLIC PANEL, COMPREHENSIVE    Collection Time: 03/11/18  4:53 PM   Result Value Ref Range    Sodium 123 (L) 136 - 145 mmol/L    Potassium 4.7 3.5 - 5.1 mmol/L    Chloride 89 (L) 97 - 108 mmol/L    CO2 29 21 - 32 mmol/L    Anion gap 5 5 - 15 mmol/L    Glucose 731 (HH) 65 - 100 mg/dL    BUN 10 6 - 20 MG/DL    Creatinine 1.18 0.70 - 1.30 MG/DL    BUN/Creatinine ratio 8 (L) 12 - 20      GFR est AA >60 >60 ml/min/1.73m2    GFR est non-AA >60 >60 ml/min/1.73m2    Calcium 9.2 8.5 - 10.1 MG/DL    Bilirubin, total 0.4 0.2 - 1.0 MG/DL    ALT (SGPT) 32 12 - 78 U/L    AST (SGOT) 17 15 - 37 U/L    Alk.  phosphatase 210 (H) 45 - 117 U/L    Protein, total 8.0 6.4 - 8.2 g/dL    Albumin 3.3 (L) 3.5 - 5.0 g/dL    Globulin 4.7 (H) 2.0 - 4.0 g/dL    A-G Ratio 0.7 (L) 1.1 - 2.2     URINALYSIS W/ REFLEX CULTURE    Collection Time: 03/11/18  5:23 PM   Result Value Ref Range    Color YELLOW/STRAW      Appearance CLEAR CLEAR      Specific gravity 1.030 1.003 - 1.030      pH (UA) 5.5 5.0 - 8.0      Protein NEGATIVE  NEG mg/dL    Glucose >1000 (A) NEG mg/dL    Ketone NEGATIVE  NEG mg/dL    Bilirubin NEGATIVE  NEG      Blood TRACE (A) NEG      Urobilinogen 0.2 0.2 - 1.0 EU/dL    Nitrites NEGATIVE  NEG      Leukocyte Esterase NEGATIVE  NEG      UA:UC IF INDICATED CULTURE NOT INDICATED BY UA RESULT CNI      WBC 0-4 0 - 4 /hpf    RBC 0-5 0 - 5 /hpf    Epithelial cells FEW FEW /lpf    Bacteria NEGATIVE  NEG /hpf    Hyaline cast 0-2 0 - 5 /lpf

## 2018-03-11 NOTE — ED NOTES
Pt presents to ED for worsening wound to bottom of left foot, pt reports he stepped on a nail 3/4/18. Pt is a diabetic with uncontrolled diabetes and states \"I don't check my blood glucose at home. \" Patient states he came back in for reevaluation because he is having worsening redness and swelling and continued pain around his left foot puncture wound. Pt alert and oriented x 4.

## 2018-03-12 LAB
ADMINISTERED INITIALS, ADMINIT: NORMAL
ALBUMIN SERPL-MCNC: 2.4 G/DL (ref 3.5–5)
ALBUMIN/GLOB SERPL: 0.7 {RATIO} (ref 1.1–2.2)
ALP SERPL-CCNC: 143 U/L (ref 45–117)
ALT SERPL-CCNC: 22 U/L (ref 12–78)
ANION GAP SERPL CALC-SCNC: 7 MMOL/L (ref 5–15)
AST SERPL-CCNC: 11 U/L (ref 15–37)
BASOPHILS # BLD: 0 K/UL (ref 0–0.1)
BASOPHILS NFR BLD: 0 % (ref 0–1)
BILIRUB SERPL-MCNC: 0.3 MG/DL (ref 0.2–1)
BUN SERPL-MCNC: 9 MG/DL (ref 6–20)
BUN/CREAT SERPL: 16 (ref 12–20)
CALCIUM SERPL-MCNC: 8 MG/DL (ref 8.5–10.1)
CHLORIDE SERPL-SCNC: 104 MMOL/L (ref 97–108)
CO2 SERPL-SCNC: 25 MMOL/L (ref 21–32)
CREAT SERPL-MCNC: 0.55 MG/DL (ref 0.7–1.3)
D50 ADMINISTERED, D50ADM: 0 ML
D50 ADMINISTERED, D50ADM: 10 ML
D50 ORDER, D50ORD: 0 ML
D50 ORDER, D50ORD: 10 ML
DIFFERENTIAL METHOD BLD: ABNORMAL
EOSINOPHIL # BLD: 0.2 K/UL (ref 0–0.4)
EOSINOPHIL NFR BLD: 2 % (ref 0–7)
ERYTHROCYTE [DISTWIDTH] IN BLOOD BY AUTOMATED COUNT: 11.7 % (ref 11.5–14.5)
EST. AVERAGE GLUCOSE BLD GHB EST-MCNC: 324 MG/DL
GLOBULIN SER CALC-MCNC: 3.4 G/DL (ref 2–4)
GLSCOM COMMENTS: NORMAL
GLUCOSE BLD STRIP.AUTO-MCNC: 112 MG/DL (ref 65–100)
GLUCOSE BLD STRIP.AUTO-MCNC: 112 MG/DL (ref 65–100)
GLUCOSE BLD STRIP.AUTO-MCNC: 141 MG/DL (ref 65–100)
GLUCOSE BLD STRIP.AUTO-MCNC: 151 MG/DL (ref 65–100)
GLUCOSE BLD STRIP.AUTO-MCNC: 155 MG/DL (ref 65–100)
GLUCOSE BLD STRIP.AUTO-MCNC: 156 MG/DL (ref 65–100)
GLUCOSE BLD STRIP.AUTO-MCNC: 176 MG/DL (ref 65–100)
GLUCOSE BLD STRIP.AUTO-MCNC: 178 MG/DL (ref 65–100)
GLUCOSE BLD STRIP.AUTO-MCNC: 184 MG/DL (ref 65–100)
GLUCOSE BLD STRIP.AUTO-MCNC: 185 MG/DL (ref 65–100)
GLUCOSE BLD STRIP.AUTO-MCNC: 189 MG/DL (ref 65–100)
GLUCOSE BLD STRIP.AUTO-MCNC: 218 MG/DL (ref 65–100)
GLUCOSE BLD STRIP.AUTO-MCNC: 74 MG/DL (ref 65–100)
GLUCOSE BLD STRIP.AUTO-MCNC: 90 MG/DL (ref 65–100)
GLUCOSE SERPL-MCNC: 158 MG/DL (ref 65–100)
GLUCOSE, GLC: 112 MG/DL
GLUCOSE, GLC: 112 MG/DL
GLUCOSE, GLC: 141 MG/DL
GLUCOSE, GLC: 155 MG/DL
GLUCOSE, GLC: 156 MG/DL
GLUCOSE, GLC: 176 MG/DL
GLUCOSE, GLC: 178 MG/DL
GLUCOSE, GLC: 185 MG/DL
GLUCOSE, GLC: 189 MG/DL
GLUCOSE, GLC: 74 MG/DL
GLUCOSE, GLC: 90 MG/DL
HBA1C MFR BLD: 12.9 % (ref 4.2–6.3)
HCT VFR BLD AUTO: 32.1 % (ref 36.6–50.3)
HGB BLD-MCNC: 11.4 G/DL (ref 12.1–17)
HIGH TARGET, HITG: 250 MG/DL
IMM GRANULOCYTES # BLD: 0 K/UL (ref 0–0.04)
IMM GRANULOCYTES NFR BLD AUTO: 0 % (ref 0–0.5)
INSULIN ADMINSTERED, INSADM: 0 UNITS/HOUR
INSULIN ADMINSTERED, INSADM: 0.1 UNITS/HOUR
INSULIN ORDER, INSORD: 0 UNITS/HOUR
INSULIN ORDER, INSORD: 0.1 UNITS/HOUR
LOW TARGET, LOT: 150 MG/DL
LYMPHOCYTES # BLD: 1.9 K/UL (ref 0.8–3.5)
LYMPHOCYTES NFR BLD: 21 % (ref 12–49)
MAGNESIUM SERPL-MCNC: 1.9 MG/DL (ref 1.6–2.4)
MCH RBC QN AUTO: 32.1 PG (ref 26–34)
MCHC RBC AUTO-ENTMCNC: 35.5 G/DL (ref 30–36.5)
MCV RBC AUTO: 90.4 FL (ref 80–99)
MINUTES UNTIL NEXT BG, NBG: 15 MIN
MINUTES UNTIL NEXT BG, NBG: 60 MIN
MONOCYTES # BLD: 0.9 K/UL (ref 0–1)
MONOCYTES NFR BLD: 10 % (ref 5–13)
MULTIPLIER, MUL: 0
NEUTS SEG # BLD: 6.1 K/UL (ref 1.8–8)
NEUTS SEG NFR BLD: 67 % (ref 32–75)
NRBC # BLD: 0 K/UL (ref 0–0.01)
NRBC BLD-RTO: 0 PER 100 WBC
ORDER INITIALS, ORDINIT: NORMAL
PLATELET # BLD AUTO: 261 K/UL (ref 150–400)
PMV BLD AUTO: 10.7 FL (ref 8.9–12.9)
POTASSIUM SERPL-SCNC: 3.6 MMOL/L (ref 3.5–5.1)
PROT SERPL-MCNC: 5.8 G/DL (ref 6.4–8.2)
RBC # BLD AUTO: 3.55 M/UL (ref 4.1–5.7)
SERVICE CMNT-IMP: ABNORMAL
SERVICE CMNT-IMP: NORMAL
SERVICE CMNT-IMP: NORMAL
SODIUM SERPL-SCNC: 136 MMOL/L (ref 136–145)
TSH SERPL DL<=0.05 MIU/L-ACNC: 5.31 UIU/ML (ref 0.36–3.74)
WBC # BLD AUTO: 9 K/UL (ref 4.1–11.1)

## 2018-03-12 PROCEDURE — 82962 GLUCOSE BLOOD TEST: CPT

## 2018-03-12 PROCEDURE — 83735 ASSAY OF MAGNESIUM: CPT | Performed by: INTERNAL MEDICINE

## 2018-03-12 PROCEDURE — 87186 SC STD MICRODIL/AGAR DIL: CPT | Performed by: PODIATRIST

## 2018-03-12 PROCEDURE — 77030019604 HC DRSG WND CA ALG S&N -A

## 2018-03-12 PROCEDURE — 36415 COLL VENOUS BLD VENIPUNCTURE: CPT | Performed by: INTERNAL MEDICINE

## 2018-03-12 PROCEDURE — 77030018836 HC SOL IRR NACL ICUM -A

## 2018-03-12 PROCEDURE — 87205 SMEAR GRAM STAIN: CPT | Performed by: PODIATRIST

## 2018-03-12 PROCEDURE — 87147 CULTURE TYPE IMMUNOLOGIC: CPT | Performed by: PODIATRIST

## 2018-03-12 PROCEDURE — 80053 COMPREHEN METABOLIC PANEL: CPT | Performed by: INTERNAL MEDICINE

## 2018-03-12 PROCEDURE — 87077 CULTURE AEROBIC IDENTIFY: CPT | Performed by: PODIATRIST

## 2018-03-12 PROCEDURE — 74011250636 HC RX REV CODE- 250/636: Performed by: INTERNAL MEDICINE

## 2018-03-12 PROCEDURE — 0H9NXZZ DRAINAGE OF LEFT FOOT SKIN, EXTERNAL APPROACH: ICD-10-PCS | Performed by: PODIATRIST

## 2018-03-12 PROCEDURE — 74011000258 HC RX REV CODE- 258: Performed by: INTERNAL MEDICINE

## 2018-03-12 PROCEDURE — 84443 ASSAY THYROID STIM HORMONE: CPT | Performed by: INTERNAL MEDICINE

## 2018-03-12 PROCEDURE — 74011636637 HC RX REV CODE- 636/637: Performed by: INTERNAL MEDICINE

## 2018-03-12 PROCEDURE — 85025 COMPLETE CBC W/AUTO DIFF WBC: CPT | Performed by: INTERNAL MEDICINE

## 2018-03-12 PROCEDURE — 97161 PT EVAL LOW COMPLEX 20 MIN: CPT

## 2018-03-12 PROCEDURE — 65660000000 HC RM CCU STEPDOWN

## 2018-03-12 PROCEDURE — 83036 HEMOGLOBIN GLYCOSYLATED A1C: CPT | Performed by: INTERNAL MEDICINE

## 2018-03-12 PROCEDURE — 74011250637 HC RX REV CODE- 250/637: Performed by: INTERNAL MEDICINE

## 2018-03-12 RX ORDER — VANCOMYCIN/0.9 % SOD CHLORIDE 1 G/100 ML
1000 PLASTIC BAG, INJECTION (ML) INTRAVENOUS EVERY 8 HOURS
Status: DISCONTINUED | OUTPATIENT
Start: 2018-03-12 | End: 2018-03-13 | Stop reason: DRUGHIGH

## 2018-03-12 RX ORDER — DEXTROSE 50 % IN WATER (D50W) INTRAVENOUS SYRINGE
12.5-25 AS NEEDED
Status: DISCONTINUED | OUTPATIENT
Start: 2018-03-12 | End: 2018-03-12 | Stop reason: SDUPTHER

## 2018-03-12 RX ORDER — INSULIN GLARGINE 100 [IU]/ML
46 INJECTION, SOLUTION SUBCUTANEOUS
Status: DISCONTINUED | OUTPATIENT
Start: 2018-03-12 | End: 2018-03-13

## 2018-03-12 RX ORDER — MAGNESIUM SULFATE 100 %
4 CRYSTALS MISCELLANEOUS AS NEEDED
Status: DISCONTINUED | OUTPATIENT
Start: 2018-03-12 | End: 2018-03-12 | Stop reason: SDUPTHER

## 2018-03-12 RX ORDER — INSULIN LISPRO 100 [IU]/ML
INJECTION, SOLUTION INTRAVENOUS; SUBCUTANEOUS
Status: DISCONTINUED | OUTPATIENT
Start: 2018-03-12 | End: 2018-03-14 | Stop reason: HOSPADM

## 2018-03-12 RX ADMIN — ACETAMINOPHEN 650 MG: 325 TABLET ORAL at 17:57

## 2018-03-12 RX ADMIN — VANCOMYCIN HYDROCHLORIDE 1000 MG: 10 INJECTION, POWDER, LYOPHILIZED, FOR SOLUTION INTRAVENOUS at 18:32

## 2018-03-12 RX ADMIN — Medication 10 ML: at 05:26

## 2018-03-12 RX ADMIN — PIPERACILLIN SODIUM AND TAZOBACTAM SODIUM 3.38 G: .375; 3 INJECTION, POWDER, LYOPHILIZED, FOR SOLUTION INTRAVENOUS at 01:16

## 2018-03-12 RX ADMIN — OXYCODONE HYDROCHLORIDE AND ACETAMINOPHEN 1 TABLET: 5; 325 TABLET ORAL at 02:42

## 2018-03-12 RX ADMIN — PIPERACILLIN SODIUM AND TAZOBACTAM SODIUM 3.38 G: .375; 3 INJECTION, POWDER, LYOPHILIZED, FOR SOLUTION INTRAVENOUS at 09:30

## 2018-03-12 RX ADMIN — ACETAMINOPHEN 650 MG: 325 TABLET ORAL at 11:54

## 2018-03-12 RX ADMIN — OXYCODONE HYDROCHLORIDE AND ACETAMINOPHEN 1 TABLET: 5; 325 TABLET ORAL at 10:18

## 2018-03-12 RX ADMIN — PIPERACILLIN SODIUM AND TAZOBACTAM SODIUM 3.38 G: .375; 3 INJECTION, POWDER, LYOPHILIZED, FOR SOLUTION INTRAVENOUS at 17:59

## 2018-03-12 RX ADMIN — INSULIN GLARGINE 46 UNITS: 100 INJECTION, SOLUTION SUBCUTANEOUS at 21:25

## 2018-03-12 RX ADMIN — DEXTROSE MONOHYDRATE AND SODIUM CHLORIDE 100 ML/HR: 5; .9 INJECTION, SOLUTION INTRAVENOUS at 05:24

## 2018-03-12 RX ADMIN — Medication 10 ML: at 21:25

## 2018-03-12 RX ADMIN — Medication 5 ML: at 14:00

## 2018-03-12 RX ADMIN — OXYCODONE HYDROCHLORIDE AND ACETAMINOPHEN 1 TABLET: 5; 325 TABLET ORAL at 20:15

## 2018-03-12 RX ADMIN — ENOXAPARIN SODIUM 40 MG: 40 INJECTION SUBCUTANEOUS at 20:15

## 2018-03-12 RX ADMIN — MORPHINE SULFATE 2 MG: 4 INJECTION, SOLUTION INTRAMUSCULAR; INTRAVENOUS at 01:19

## 2018-03-12 RX ADMIN — OXYCODONE HYDROCHLORIDE AND ACETAMINOPHEN 1 TABLET: 5; 325 TABLET ORAL at 14:21

## 2018-03-12 RX ADMIN — INSULIN LISPRO 3 UNITS: 100 INJECTION, SOLUTION INTRAVENOUS; SUBCUTANEOUS at 12:14

## 2018-03-12 RX ADMIN — VANCOMYCIN HYDROCHLORIDE 1000 MG: 1 INJECTION, POWDER, LYOPHILIZED, FOR SOLUTION INTRAVENOUS at 09:29

## 2018-03-12 RX ADMIN — MORPHINE SULFATE 2 MG: 4 INJECTION, SOLUTION INTRAMUSCULAR; INTRAVENOUS at 17:26

## 2018-03-12 RX ADMIN — INSULIN LISPRO 2 UNITS: 100 INJECTION, SOLUTION INTRAVENOUS; SUBCUTANEOUS at 17:26

## 2018-03-12 RX ADMIN — OXYCODONE HYDROCHLORIDE AND ACETAMINOPHEN 1 TABLET: 5; 325 TABLET ORAL at 06:32

## 2018-03-12 RX ADMIN — INSULIN LISPRO 2 UNITS: 100 INJECTION, SOLUTION INTRAVENOUS; SUBCUTANEOUS at 21:25

## 2018-03-12 NOTE — PROGRESS NOTES
physical Therapy EVALUATION/DISCHARGE  Patient: Doyne Frankel (89 y.o. male)  Date: 3/12/2018  Primary Diagnosis: Sepsis ([address]        Precautions:      ASSESSMENT :  Based on the objective data described below, the patient received supine in bed and agreeable to therapy. Patient reported being independent, lives with spouse and children in a 2 story home with  Bedroom on first floor. 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 Patient completed supine to sit independently, sit<>stand without AD and ambulated within the room without difficulty. Instructed patient to weight bear through L heel until further MD recommendations. Patient is cleared from a PT standpoint as he is mobilizing at his baseline and independent. Recommend remain OOB and ambulate with RN staff as tolerated. .    Skilled physical therapy is not indicated at this time. PLAN :  Discharge Recommendations: None  Further Equipment Recommendations for Discharge: none     SUBJECTIVE:   Patient stated I get around fine.     OBJECTIVE DATA SUMMARY:   HISTORY:    Past Medical History:   Diagnosis Date    Bipolar 1 disorder, depressed ([address]     Bipolar disorder ([address]     Depression     Diabetes ([address]     DKA, type 1 ([address] 1/27/2013    diagnosed age 23    H/O noncompliance with medical treatment, presenting hazards to health     MRSA (methicillin resistant staph aureus) culture positive     MRSA (methicillin resistant Staphylococcus aureus)     Face    Noncompliance with medication regimen     Second hand smoke exposure     Seizure ([address]     Seizures ([address] 2006 or 2007    one episode during senior living     Past Surgical History:   Procedure Laterality Date    HX HEENT      top left wisdom tooth    HX ORTHOPAEDIC Left     wrist; MCV     Prior Level of Function/Home Situation: independent, ambulatory without AD  Personal factors and/or comorbidities impacting plan of care:     [address] [place] [address] Environment: Private residence  # Steps to Enter: 1  One/Two Story Residence: Two story, live on 1st floor  # of Interior Steps: 13  Height of Each Step (in): 6 inches  Interior Rails: Left  Lift Chair Available: No  Living Alone: No  Support Systems: Spouse/Significant Other/Partner, Child(sivakumar)  Patient Expects to be Discharged to[de-identified] Private residence  Current DME Used/Available at Home: None    EXAMINATION/PRESENTATION/DECISION MAKING:   Critical Behavior:              Hearing: Auditory  Auditory Impairment: None  Skin:    Edema:   Range Of Motion:  AROM: Within functional limits           PROM: Within functional limits           Strength:    Strength:  Within functional limits                    Tone & Sensation:                  Sensation: Impaired        Functional Mobility:  Bed Mobility:     Supine to Sit: Independent  Sit to Supine: Independent     Transfers:  Sit to Stand: Independent  Stand to Sit: Independent              Balance:   Sitting: Intact  Standing: Intact  Ambulation/Gait Training:  Distance (ft): 40 Feet (ft)  Assistive Device: Gait belt  Ambulation - Level of Assistance: Supervision        Gait Abnormalities: Decreased step clearance        Base of Support: Narrowed  Stance: Left decreased  Speed/Ana: Pace decreased (<100 feet/min)  Step Length: Right shortened;Left shortened       Functional Measure:  Tinetti test:    Sitting Balance: 1  Arises: 2  Attempts to Rise: 2  Immediate Standing Balance: 2  Standing Balance: 2  Nudged: 1  Eyes Closed: 1  Turn 360 Degrees - Continuous/Discontinuous: 1  Turn 360 Degrees - Steady/Unsteady: 1  Sitting Down: 2  Balance Score: 15  Indication of Gait: 1  R Step Length/Height: 1  L Step Length/Height: 1  R Foot Clearance: 1  L Foot Clearance: 1  Step Symmetry: 1  Step Continuity: 1  Path: 1  Trunk: 2  Walking Time: 1  Gait Score: 11  Total Score: 26       Tinetti Test and G-code impairment scale:  Percentage of Impairment CH    0%   CI    1-19% CJ    20-39% CK    40-59% CL    60-79% CM    80-99% CN     100%   Tinetti  Score 0-28 28 23-27 17-22 12-16 6-11 1-5 0       Tinetti Tool Score Risk of Falls  <19 = High Fall Risk  19-24 = Moderate Fall Risk  25-28 = Low Fall Risk  Tinetti ME. Performance-Oriented Assessment of Mobility Problems in Elderly Patients. [facility]; [id number]. (Scoring Description: PT Bulletin Feb. 10, 1993)    Older adults: Justen Raymond et al, 2009; n = 1000 [facility] elderly evaluated with ABC, MARY, ADL, and IADL)  · Mean MARY score for males aged 69-68 years = 26.21(3.40)  · Mean MARY score for females age 69-68 years = 25.16(4.30)  · Mean MARY score for males over 80 years = 23.29(6.02)  · Mean MARY score for females over 80 years = 17.20(8.32)         G codes: In compliance with CMSs Claims Based Outcome Reporting, the following G-code set was chosen for this patient based on their primary functional limitation being treated: The outcome measure chosen to determine the severity of the functional limitation was the Tinetti with a score of 26/28 which was correlated with the impairment scale. ? Mobility - Walking and Moving Around:     - CURRENT STATUS: CI - 1%-19% impaired, limited or restricted    - GOAL STATUS: CI - 1%-19% impaired, limited or restricted    - D/C STATUS:  CI - 1%-19% impaired, limited or restricted        Based on the above components, the patient evaluation is determined to be of the following complexity level: LOW     Pain:  Pain Scale 1: Numeric (0 - 10)  Pain Intensity 1: 0  Pain Location 1: Foot  Activity Tolerance:   Good. VSS  Please refer to the flowsheet for vital signs taken during this treatment.   After treatment:   []   Patient left in no apparent distress sitting up in chair  [x]   Patient left in no apparent distress in bed  [x]   Call bell left within reach  [x]   Nursing notified  []   Caregiver present  []   Bed alarm activated    COMMUNICATION/EDUCATION: Communication/Collaboration:  [x]   Fall prevention education was provided and the patient/caregiver indicated understanding. [x]   Patient/family have participated as able and agree with findings and recommendations. []   Patient is unable to participate in plan of care at this time.   Findings and recommendations were discussed with: Occupational Therapist and Registered Nurse    Thank you for this referral.  Arielle Rojas, PT, DPT   Time Calculation: 8 mins

## 2018-03-12 NOTE — DIABETES MGMT
DTC Consult Note    Recommendations/ Comments: If appropriate, please consider continuing insulin gtt until anion gap is less than 12 on two consecutive BMPs and BG is less than 200 mg/dl and transition per protocol. Please consider using home Lantus dose of 46 units daily to transition patient off insulin drip. Current hospital DM medication: insulin gtt    Consult received for:  [x]             Insulin infusion         Chart reviewed and initial evaluation complete on [place]. Met with patient. States that he stepped on a nail about 2 weeks ago. Came to the hospital, got a Tetanus shot, and antiboitics. States that after a few days his fiance said that it was not healing well. Patient then came to the hospital.  Denies missing insulin. States he has been seeing a doctor at the [facility]. States that he will now be seeing Dr. Todd Mejia since he has insurance. Daily intake:  Breakfast: Bologna/egg/cheese sandwich- no insulin  Lunch: 1-2 bologna sandwiches, regular mountain dew, chips- no insulin  Dinner- states this is when he will eat anything and everything he can find. - will typically dose insulin based on the amount he eats. Does not CHO count. Reports that he is very active at work and will not take insulin with meals so he won't drop low. Does not check his sugars at work, and states when he feels like he is going low, he will chug a coke. Discussed the importance of knowing what his sugar is before just blindly drinking soda. Patient to omit regular sodas and  change to diet    Patient is a 30 y.o. 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 Takes Lantus 46 units daily and Sliding Scale Regular insulin rarely.     Assessed and instructed patient on the following:   ·  interpretation of lab results, blood sugar goals, complications of diabetes mellitus, exercise, SMBG skills, nutrition, referred to Diabetes Educator, site rotation, carbohydrate counting and self-injection of insulin    Provided patient with the following: [x]             Survival skills education materials                              [x]             Outpatient DTC contact number               Discussed with patient and/or family need for follow up appointment for diabetes management after discharge. A1c:   Lab Results   Component Value Date/Time    Hemoglobin A1c 12.9 (H) 03/12/2018 03:33 AM       Recent Glucose Results:   Lab Results   Component Value Date/Time     (H) 03/12/2018 03:33 AM    GLU 92 03/11/2018 10:49 PM     (HH) 03/11/2018 04:53 PM    GLUCPOC 90 03/12/2018 09:38 AM    GLUCPOC 74 03/12/2018 09:15 AM    GLUCPOC 112 (H) 03/12/2018 07:37 AM        Lab Results   Component Value Date/Time    Creatinine 0.55 (L) 03/12/2018 03:33 AM     Estimated Creatinine Clearance: 169.2 mL/min (based on Cr of 0.55). Active Orders   Diet    DIET DIABETIC CONSISTENT CARB Regular        PO intake: No data found. Will continue to follow as needed. Thank you.     Raejean Schirmer, [address], [address]  Office:  [phone]

## 2018-03-12 NOTE — CONSULTS
[facility]  Phone: [phone]  Fax: [phone]  [address] [facility] [address]  [facility]  [facility]  [address] [address] [facility], [address] [facility]  [address] [place] [address] [address]  [address] Ji Wilson, MAGGYM, FACFAOM  Foot & Ankle Specialist and Surgeon                                                               Podiatric Surgery Consultation  Assessment/Plan:  - Pt evaluated and tx.  - cellulitis of  Left foot with plantar lesion concerning for superficial abscess and infection incision and drainage with exploration indictated. Risks of procedure (bleeding, infection, pain) were discussed with patient, all questions/concerns were addressed, and consent was signed Procedure perfromed and culture taken and send  -Nursing verbal order for packing dressing daily  -continue current antibiotics  -follow up  As outpatient with my office patient is to call [phone] when discharge   - Thank you for this consultation. Please do not hesitate to call with any questions. Subjective:  Pt complains of wound/abscess/fluid collection to bottom of left foot. Previous tx include antibiotics . Positive for fever, chills    HPI:  Adelene Hamman is a 30 y.o.  male  with PMHx significant for DM and MRSA, presented  ED for evaluation of a gradually worsening puncture wound to the bottom of his left foot. Per review of old records, the puncture wound initially occurred on 3/4/18 when the patient stepped on a nail. The patient went to this ED for evaluation of the wound with worsening redness and was evaluated and prescribed Levaquin and Doxycycline. Patient came back in for reevaluation because he is having worsening redness and swelling and continued pain around his left foot puncture wound. Patient admits to tobacco abuse. He denies any additional associated symptoms at this time. At this time patient is lying in bed c/o pain, swelling, erythema in left foot, feeling weak and having malaise, denies chest pain, no SOB, no fever, no N/V no diarrhea, no urinary symptoms, no other associated symptoms.   I was consulted to evaluate the patients left foot   History:  Sepsis ([address]  No Known Allergies  Family History   Problem Relation Age of Onset    Diabetes Mother     Diabetes Other      neice, type 1       Past Medical History:   Diagnosis Date    Bipolar 1 disorder, depressed ([address]     Bipolar disorder ([address]     Depression     Diabetes ([address]     DKA, type 1 ([address] 1/27/2013    diagnosed age 23    H/O noncompliance with medical treatment, presenting hazards to health     MRSA (methicillin resistant staph aureus) culture positive     MRSA (methicillin resistant Staphylococcus aureus)     Face    Noncompliance with medication regimen     Second hand smoke exposure     Seizure ([address]     Seizures ([address] 2006 or 2007    one episode during long term     Past Surgical History:   Procedure Laterality Date    HX HEENT      top left wisdom tooth    HX ORTHOPAEDIC Left     wrist; MCV     Social History   Substance Use Topics    Smoking status: Current Every Day Smoker     Packs/day: 1.00     Years: 16.00     Types: Cigarettes    Smokeless tobacco: Never Used    Alcohol use No       History   Alcohol Use No     History   Drug Use No      History   Smoking Status    Current Every Day Smoker    Packs/day: 1.00    Years: 16.00    Types: Cigarettes   Smokeless Tobacco    Never Used     Current Facility-Administered Medications   Medication Dose Route Frequency    insulin glargine (LANTUS) injection 46 Units  46 Units SubCUTAneous QHS    insulin lispro (HUMALOG) injection   SubCUTAneous AC&HS    piperacillin-tazobactam (ZOSYN) 3.375 g in  ml premix/cmpd  3.375 g IntraVENous Q8H    acetaminophen (TYLENOL) tablet 650 mg  650 mg Oral Q4H PRN    ondansetron (ZOFRAN) injection 4 mg  4 mg IntraVENous Q6H PRN    labetalol (NORMODYNE;TRANDATE) injection 10 mg  10 mg IntraVENous Q6H PRN    oxyCODONE-acetaminophen (PERCOCET) 5-325 mg per tablet 1 Tab  1 Tab Oral Q4H PRN    morphine injection 2 mg  2 mg IntraVENous Q4H PRN    enoxaparin (LOVENOX) injection 40 mg  40 mg SubCUTAneous Q24H    sodium chloride (NS) flush 5-10 mL  5-10 mL IntraVENous Q8H    sodium chloride (NS) flush 5-10 mL  5-10 mL IntraVENous PRN    glucose chewable tablet 16 g  4 Tab Oral PRN    dextrose (D50W) injection syrg 12.5-25 g  12.5-25 g IntraVENous PRN    glucagon (GLUCAGEN) injection 1 mg  1 mg IntraMUSCular PRN    vancomycin (VANCOCIN) 1,000 mg in 0.9% sodium chloride (MBP/ADV) 250 mL  1,000 mg IntraVENous Q12H    nicotine (NICODERM CQ) 14 mg/24 hr patch 1 Patch  1 Patch TransDERmal DAILY        Objective:  Vitals: Patient Vitals for the past 12 hrs:   BP Temp Pulse Resp SpO2   03/12/18 1146 (!) 162/94 97.7 °F (36.5 °C) 90 16 100 %   03/12/18 0736 (!) 172/95 98 °F (36.7 °C) 90 16 -   03/12/18 0343 (!) 147/94 98.1 °F (36.7 °C) 87 16 100 %     LEFT LE    Vascular:    DP 2/4; PT 2/4  capillary fill time brisk, pitting edema is present, skin temperature is warm  Dermatological:  Nails within normal limits  Skin is dry and scaly   Lesions are present consisting of hyperkeratosis at submetatarsal 3 with mild fluctuance underneath lesion  Cellulitis present  To bottom of left foot with increased warmth    Neurological:  DTR are present, protective sensation per 5.07 [place] monofilament is 3/10 , patient is AAOx3, mood is normal. Epicritic sensation is intact. Orthopedic:   ROM of ankle, STJ, 1st MTPJ is limited, MMT 5 out of 5 for B/L LE. Constitutional: Pt is a well developed    Lymphatics: negative tenderness to palpation of neck/axillary/inguinal nodes. Imaging / Cx / Px:  EXAM:  CT LOW EXT LT W CONT 3/11/2018  8:16 PM     INDICATION:  Increasing wound drainage despite antibiotics.  Stepped on a nail on  3/4/2018.      COMPARISON: Left foot views on 3/9/2018 and 3/11/2018     TECHNIQUE: Helical CT of the left foot with coronal and sagittal reformats. Images reviewed in soft tissue and bone windows. CT dose reduction was achieved  through the use of a standardized protocol tailored for this examination and  automatic exposure control for dose modulation.     CONTRAST: Post IV contrast 100 mL Isovue-370     FINDINGS: Bones: Normal bone mineralization. No fracture, dislocation, or  periosteal reaction.     Joint fluid:  None.     Articulations: No significant osteoarthritis. No evidence of inflammatory  arthritis.     Tendons: No full-thickness tendon tear.     Muscles: No intramuscular hematoma. No focal atrophy.     Soft tissue mass: Subtle skin breakdown is plantar to the distal third  metatarsal. Underlying edema in the subcutaneous adipose tissues. No abscess. No  pathologically enhancing mass.     IMPRESSION  IMPRESSION:      Skin breakdown is plantar to the third metatarsal head. Underlying cellulitis. No abscess or osteomyelitis. EXAM:  XR FOOT LT MIN 3 V  3/11/2018  4:18 PM     INDICATION:   Trauma. Puncture wound.     COMPARISON:  Radiographs 3/9/2018     FINDINGS:  Three views of the left foot demonstrate no demonstration of  radiographically apparent foreign body or soft tissue gas. .  No bone or joint  abnormality is demonstrated. There is anatomic alignment     IMPRESSION  IMPRESSION:  No acute abnormality. No radiographically apparent foreign body  demonstrated.     CULTURE, BLOOD, PAIRED [[id number]] (Order [id number])   Microbiology    Date: 3/11/2018   Department: [facility]   Released By/Authorizing: [facility] (auto-released)             3/12/2018 10:36 AM - Isidro, Lab In Home-Account       Component Results      Component Value Flag Ref Range Units Status     Special Requests: NO SPECIAL REQUESTS     Preliminary     Culture result:      Preliminary     NO GROWTH AFTER 15 HOURS      Labs:  Recent Labs      03/12/18   0333   03/11/18   1653   WBC  9.0   --   13.0*   CRP   --    --   7.60*   CREA  0.55*   < >  1.18   BUN  9   < >  10   GLU  158*   < >  731*   HGB  11.4*   --   13.7   HCT  32.1*   --   39.9   NA  136   < >  123*   K  3.6   < >  4.7   CL  104   < >  89*   CO2  25   < >  29    < > = values in this interval not displayed.

## 2018-03-12 NOTE — PROGRESS NOTES
Pharmacy Automatic Renal Dosing Protocol - Antimicrobials    Indication for Antimicrobials: LLE cellulitis s/p I&D of wound,  no presence of osteo or abscess    Current Regimen of Each Antimicrobial:  Zosyn 3.375 gm IV every 8 hr (Start Date 3/11; Day # 2)  Vancomycin 1500 mg once then 1000 mg every 12 hr (Start Date 3/11; Day # 2)    Goal Level: VANCOMYCIN TROUGH GOAL RANGE    Vancomycin Trough: 10 - 15 mcg/mL    Measured / Extrapolated Vancomycin Level: none    Significant Cultures:   3/11- Blood- NGTD, prelim  3/12- Wound Cx with gram stain, ordered after I&D    Paralysis, amputations, malnutrition: no    Labs:  Recent Labs      18   0333  18   2249  18   1653   CREA  0.55*  0.62*  1.18   BUN  9  9  10   WBC  9.0   --   13.0*     Temp (24hrs), Av.1 °F (36.7 °C), Min:97.7 °F (36.5 °C), Max:98.5 °F (36.9 °C)      Creatinine Clearance (mL/min) or Dialysis: >100 ml/min  No results found for: PCT    Impression/Plan:   · Scr improved back to baseline  · Zosyn dosed appropriately for renal function and indication  · Will empirically adjust vancomycin dose to 1,000 mg Q8 hours (~15 mg/kg maintenance dose) and draw repeat trough level prior to 4th dose  · Daily BMP and every other day CBC entered per protocol     Pharmacy will follow daily and adjust medications as appropriate for renal function and/or serum levels. Thank you,  Diane Hood [place]          Recommended duration of therapy  http://[facility]/[place]/[facility]/Pharmacy/Clinical%20Companion/Duration%20of%20ABX%20therapy. docx    Renal Dosing  http://[facility]/[place]/[facility]/Pharmacy/Clinical%20Companion/Renal%[id number]. pdf
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
## 2018-03-12 NOTE — PROGRESS NOTES
Occupational Therapy  Chart reviewed, referral received. Spoke with PT who reported pt is not in need of acute services as pt is at his baseline. Will sign off.  Linda Garibay, OT

## 2018-03-12 NOTE — ED NOTES
TRANSFER - OUT REPORT:    Verbal report given to Sue Burrell RN (name) on Derek Mei  being transferred to PCU (unit) for routine progression of care       Report consisted of patients Situation, Background, Assessment and   Recommendations(SBAR). Information from the following report(s) SBAR, Kardex, ED Summary, Intake/Output, MAR and Recent Results was reviewed with the receiving nurse. Lines:   Peripheral IV 03/11/18 Left Antecubital (Active)   Site Assessment Clean, dry, & intact 3/11/2018  6:52 PM   Phlebitis Assessment 0 3/11/2018  6:52 PM   Infiltration Assessment 0 3/11/2018  6:52 PM   Dressing Status Clean, dry, & intact 3/11/2018  6:52 PM   Dressing Type Transparent 3/11/2018  6:52 PM   Hub Color/Line Status Flushed 3/11/2018  6:52 PM   Action Taken Blood drawn 3/11/2018  6:52 PM       Peripheral IV 03/11/18 Right Forearm (Active)   Site Assessment Clean, dry, & intact 3/11/2018  8:39 PM   Phlebitis Assessment 0 3/11/2018  8:39 PM   Infiltration Assessment 0 3/11/2018  8:39 PM   Dressing Status Clean, dry, & intact 3/11/2018  8:39 PM   Dressing Type Transparent 3/11/2018  8:39 PM   Hub Color/Line Status Pink 3/11/2018  8:39 PM        Opportunity for questions and clarification was provided.       Patient transported with:   Monitor  Registered Nurse

## 2018-03-12 NOTE — PROCEDURES
[facility]  Phone: [phone]  Fax: [phone]  [address] [facility] [address]  [facility]  [address]  [address] [address] [facility], [address] [facility]  [address]  [address] [facility], [address]  Shirley Hoffman DPM, FACFAOM  Foot & Ankle Specialist and Surgeon      Procedure: Left foot incision and drainage of superficial abscess of plantar foot using #11 blade, site flushed with saline and packed with wet to dry dressing  No anesthesia necessary  Consent in chart

## 2018-03-12 NOTE — PROGRESS NOTES
Hospitalist Progress Note    NAME: Peter Mccloud   :  1980   MRN:  [id number]       Assessment / Plan:  Sepsis POA: tachycardia, leukocytosis, start IVF and ABX. Left Foot wound Infection/Cellulitis: will check CT to r/o abscess, start Zosyn/Vancomycin, get Podiatry evaluation. Srinivasan Antony help! S/P wound debridemen  HTN: on no meds, use labetalol prn. DM/HONKS: no ketones, no gap, but  and hyponatremia, will start insulin drip and monitor in stepdown, resolved,  Stop drip and change to his usual dose check HBA1C. Hyponatremia: is pseudohyponatremia due to high osmolar load, will c/w IVF and monitor. H/O Bipolar Ds: as per record but not on any meds, monitor. Smoker: counseled, start nicotine patch. Code Status: Full Code  Surrogate Decision Maker: Jef Flores [phone]  DVT Prophylaxis: lovenox  GI Prophylaxis: not indicated  Baseline: Independent      Body mass index is 21.39 kg/(m^2). Subjective:     Chief Complaint / Reason for Physician Visit; Follow up DKA  And Foot Nail injury  \"3/12  I did not feel that I had stepped on a nail thro my shoes\". Discussed with RN events overnight. Review of Systems:  Symptom Y/N Comments  Symptom Y/N Comments   Fever/Chills n   Chest Pain n    Poor Appetite n   Edema     Cough n   Abdominal Pain n    Sputum n   Joint Pain     SOB/JOHNSTON n   Pruritis/Rash     Nausea/vomit n   Tolerating PT/OT     Diarrhea    Tolerating Diet y    Constipation    Other       Could NOT obtain due to:      Objective:     VITALS:   Last 24hrs VS reviewed since prior progress note.  Most recent are:  Patient Vitals for the past 24 hrs:   Temp Pulse Resp BP SpO2   18 0736 98 °F (36.7 °C) 90 16 (!) 172/95 -   18 0343 98.1 °F (36.7 °C) 87 16 (!) 147/94 100 %   18 0013 98.1 °F (36.7 °C) 90 16 123/76 100 %   18 2149 98.2 °F (36.8 °C) 97 16 141/89 100 %   18 15 - 100 %   18 21 114/74 100 % 03/11/18 1938 - 97 11 121/72 100 %   03/11/18 1915 - 98 18 - 100 %   03/11/18 1700 - (!) 109 16 (!) 144/94 100 %   03/11/18 1607 98.5 °F (36.9 °C) (!) 120 16 148/82 100 %       Intake/Output Summary (Last 24 hours) at 03/12/18 0955  Last data filed at 03/12/18 0246   Gross per 24 hour   Intake          1147.93 ml   Output                0 ml   Net          1147.93 ml        PHYSICAL EXAM:  General: WD, WN. Alert, cooperative, no acute distress    EENT:  EOMI. Anicteric sclerae. MMM  Resp:  CTA bilaterally, no wheezing or rales. No accessory muscle use  CV:  Regular  rhythm,  No edema  GI:  Soft, Non distended, Non tender.  +Bowel sounds  Neurologic:  Alert and oriented X 3, normal speech,   Psych:   Good insight. Not anxious nor agitated  Skin:  No rashes. No jaundice. Plantar wound on Left Foot 2nd to Nail puncture, redness on Dorsum of Foot    Reviewed most current lab test results and cultures  YES  Reviewed most current radiology test results   YES  Review and summation of old records today    NO  Reviewed patient's current orders and MAR    YES  PMH/SH reviewed - no change compared to H&P  ________________________________________________________________________  Care Plan discussed with:    Comments   Patient y    Family  y Wife bedside   RN     Care Manager     Consultant                        Multidiciplinary team rounds were held today with , nursing, pharmacist and clinical coordinator. Patient's plan of care was discussed; medications were reviewed and discharge planning was addressed.      ________________________________________________________________________  Total NON critical care TIME:  25  Minutes    Total CRITICAL CARE TIME Spent:   Minutes non procedure based      Comments   >50% of visit spent in counseling and coordination of care     ________________________________________________________________________  Stephanie Almazan MD     Procedures: see electronic medical records for all procedures/Xrays and details which were not copied into this note but were reviewed prior to creation of Plan. LABS:  I reviewed today's most current labs and imaging studies.   Pertinent labs include:  Recent Labs      03/12/18   0333  03/11/18   1653   WBC  9.0  13.0*   HGB  11.4*  13.7   HCT  32.1*  39.9   PLT  261  291     Recent Labs      03/12/18   0333  03/11/18   2249  03/11/18   1653   NA  136  137  123*   K  3.6  3.7  4.7   CL  104  103  89*   CO2  25  29  29   GLU  158*  92  731*   BUN  9  9  10   CREA  0.55*  0.62*  1.18   CA  8.0*  8.1*  9.2   MG  1.9  1.9   --    PHOS   --    --   2.0*   ALB  2.4*   --   3.3*   TBILI  0.3   --   0.4   SGOT  11*   --   17   ALT  22   --   32       Signed: Stephanie Almazan MD

## 2018-03-13 LAB
ANION GAP SERPL CALC-SCNC: 7 MMOL/L (ref 5–15)
BUN SERPL-MCNC: 7 MG/DL (ref 6–20)
BUN/CREAT SERPL: 13 (ref 12–20)
CALCIUM SERPL-MCNC: 8.4 MG/DL (ref 8.5–10.1)
CHLORIDE SERPL-SCNC: 105 MMOL/L (ref 97–108)
CO2 SERPL-SCNC: 25 MMOL/L (ref 21–32)
CREAT SERPL-MCNC: 0.52 MG/DL (ref 0.7–1.3)
DATE LAST DOSE: NORMAL
ERYTHROCYTE [DISTWIDTH] IN BLOOD BY AUTOMATED COUNT: 12 % (ref 11.5–14.5)
GLUCOSE BLD STRIP.AUTO-MCNC: 123 MG/DL (ref 65–100)
GLUCOSE BLD STRIP.AUTO-MCNC: 128 MG/DL (ref 65–100)
GLUCOSE BLD STRIP.AUTO-MCNC: 138 MG/DL (ref 65–100)
GLUCOSE BLD STRIP.AUTO-MCNC: 78 MG/DL (ref 65–100)
GLUCOSE BLD STRIP.AUTO-MCNC: 91 MG/DL (ref 65–100)
GLUCOSE SERPL-MCNC: 91 MG/DL (ref 65–100)
HCT VFR BLD AUTO: 32.4 % (ref 36.6–50.3)
HGB BLD-MCNC: 11.8 G/DL (ref 12.1–17)
MAGNESIUM SERPL-MCNC: 1.7 MG/DL (ref 1.6–2.4)
MCH RBC QN AUTO: 32.9 PG (ref 26–34)
MCHC RBC AUTO-ENTMCNC: 36.4 G/DL (ref 30–36.5)
MCV RBC AUTO: 90.3 FL (ref 80–99)
NRBC # BLD: 0 K/UL (ref 0–0.01)
NRBC BLD-RTO: 0 PER 100 WBC
PLATELET # BLD AUTO: 302 K/UL (ref 150–400)
PMV BLD AUTO: 10.7 FL (ref 8.9–12.9)
POTASSIUM SERPL-SCNC: 3.7 MMOL/L (ref 3.5–5.1)
RBC # BLD AUTO: 3.59 M/UL (ref 4.1–5.7)
REPORTED DOSE,DOSE: NORMAL UNITS
REPORTED DOSE/TIME,TMG: NORMAL
SERVICE CMNT-IMP: ABNORMAL
SERVICE CMNT-IMP: NORMAL
SERVICE CMNT-IMP: NORMAL
SODIUM SERPL-SCNC: 137 MMOL/L (ref 136–145)
VANCOMYCIN TROUGH SERPL-MCNC: 9.2 UG/ML (ref 5–10)
WBC # BLD AUTO: 8 K/UL (ref 4.1–11.1)

## 2018-03-13 PROCEDURE — 83735 ASSAY OF MAGNESIUM: CPT | Performed by: INTERNAL MEDICINE

## 2018-03-13 PROCEDURE — 80202 ASSAY OF VANCOMYCIN: CPT | Performed by: INTERNAL MEDICINE

## 2018-03-13 PROCEDURE — 74011000250 HC RX REV CODE- 250: Performed by: INTERNAL MEDICINE

## 2018-03-13 PROCEDURE — 74011250637 HC RX REV CODE- 250/637: Performed by: INTERNAL MEDICINE

## 2018-03-13 PROCEDURE — 36415 COLL VENOUS BLD VENIPUNCTURE: CPT | Performed by: INTERNAL MEDICINE

## 2018-03-13 PROCEDURE — 74011250637 HC RX REV CODE- 250/637: Performed by: NURSE PRACTITIONER

## 2018-03-13 PROCEDURE — 74011636637 HC RX REV CODE- 636/637: Performed by: NURSE PRACTITIONER

## 2018-03-13 PROCEDURE — 74011250636 HC RX REV CODE- 250/636: Performed by: INTERNAL MEDICINE

## 2018-03-13 PROCEDURE — 74011250636 HC RX REV CODE- 250/636: Performed by: EMERGENCY MEDICINE

## 2018-03-13 PROCEDURE — 82962 GLUCOSE BLOOD TEST: CPT

## 2018-03-13 PROCEDURE — 85027 COMPLETE CBC AUTOMATED: CPT | Performed by: INTERNAL MEDICINE

## 2018-03-13 PROCEDURE — 80048 BASIC METABOLIC PNL TOTAL CA: CPT | Performed by: INTERNAL MEDICINE

## 2018-03-13 PROCEDURE — 65660000000 HC RM CCU STEPDOWN

## 2018-03-13 RX ORDER — LISINOPRIL 5 MG/1
2.5 TABLET ORAL DAILY
Status: DISCONTINUED | OUTPATIENT
Start: 2018-03-13 | End: 2018-03-14 | Stop reason: HOSPADM

## 2018-03-13 RX ORDER — METOCLOPRAMIDE HYDROCHLORIDE 5 MG/ML
5 INJECTION INTRAMUSCULAR; INTRAVENOUS EVERY 6 HOURS
Status: DISCONTINUED | OUTPATIENT
Start: 2018-03-13 | End: 2018-03-14 | Stop reason: HOSPADM

## 2018-03-13 RX ORDER — VANCOMYCIN HYDROCHLORIDE
1250 EVERY 8 HOURS
Status: DISCONTINUED | OUTPATIENT
Start: 2018-03-13 | End: 2018-03-14

## 2018-03-13 RX ORDER — INSULIN GLARGINE 100 [IU]/ML
38 INJECTION, SOLUTION SUBCUTANEOUS
Status: DISCONTINUED | OUTPATIENT
Start: 2018-03-13 | End: 2018-03-14 | Stop reason: HOSPADM

## 2018-03-13 RX ADMIN — ONDANSETRON 4 MG: 2 INJECTION INTRAMUSCULAR; INTRAVENOUS at 17:44

## 2018-03-13 RX ADMIN — Medication 10 ML: at 05:40

## 2018-03-13 RX ADMIN — PIPERACILLIN SODIUM AND TAZOBACTAM SODIUM 3.38 G: .375; 3 INJECTION, POWDER, LYOPHILIZED, FOR SOLUTION INTRAVENOUS at 17:00

## 2018-03-13 RX ADMIN — MORPHINE SULFATE 2 MG: 4 INJECTION, SOLUTION INTRAMUSCULAR; INTRAVENOUS at 11:10

## 2018-03-13 RX ADMIN — INSULIN GLARGINE 38 UNITS: 100 INJECTION, SOLUTION SUBCUTANEOUS at 21:50

## 2018-03-13 RX ADMIN — OXYCODONE HYDROCHLORIDE AND ACETAMINOPHEN 1 TABLET: 5; 325 TABLET ORAL at 01:17

## 2018-03-13 RX ADMIN — OXYCODONE HYDROCHLORIDE AND ACETAMINOPHEN 1 TABLET: 5; 325 TABLET ORAL at 11:49

## 2018-03-13 RX ADMIN — ONDANSETRON 4 MG: 2 INJECTION INTRAMUSCULAR; INTRAVENOUS at 07:37

## 2018-03-13 RX ADMIN — PIPERACILLIN SODIUM AND TAZOBACTAM SODIUM 3.38 G: .375; 3 INJECTION, POWDER, LYOPHILIZED, FOR SOLUTION INTRAVENOUS at 01:09

## 2018-03-13 RX ADMIN — OXYCODONE HYDROCHLORIDE AND ACETAMINOPHEN 1 TABLET: 5; 325 TABLET ORAL at 21:50

## 2018-03-13 RX ADMIN — Medication 1 CAPSULE: at 13:00

## 2018-03-13 RX ADMIN — VANCOMYCIN HYDROCHLORIDE 1000 MG: 10 INJECTION, POWDER, LYOPHILIZED, FOR SOLUTION INTRAVENOUS at 01:13

## 2018-03-13 RX ADMIN — OXYCODONE HYDROCHLORIDE AND ACETAMINOPHEN 1 TABLET: 5; 325 TABLET ORAL at 07:10

## 2018-03-13 RX ADMIN — LISINOPRIL 2.5 MG: 5 TABLET ORAL at 14:00

## 2018-03-13 RX ADMIN — OXYCODONE HYDROCHLORIDE AND ACETAMINOPHEN 1 TABLET: 5; 325 TABLET ORAL at 17:52

## 2018-03-13 RX ADMIN — PIPERACILLIN SODIUM AND TAZOBACTAM SODIUM 3.38 G: .375; 3 INJECTION, POWDER, LYOPHILIZED, FOR SOLUTION INTRAVENOUS at 08:26

## 2018-03-13 RX ADMIN — METOCLOPRAMIDE 5 MG: 5 INJECTION, SOLUTION INTRAMUSCULAR; INTRAVENOUS at 23:18

## 2018-03-13 RX ADMIN — VANCOMYCIN HYDROCHLORIDE 1000 MG: 10 INJECTION, POWDER, LYOPHILIZED, FOR SOLUTION INTRAVENOUS at 08:27

## 2018-03-13 RX ADMIN — METOCLOPRAMIDE 5 MG: 5 INJECTION, SOLUTION INTRAMUSCULAR; INTRAVENOUS at 19:27

## 2018-03-13 RX ADMIN — ENOXAPARIN SODIUM 40 MG: 40 INJECTION SUBCUTANEOUS at 19:27

## 2018-03-13 RX ADMIN — VANCOMYCIN HYDROCHLORIDE 1250 MG: 10 INJECTION, POWDER, LYOPHILIZED, FOR SOLUTION INTRAVENOUS at 17:45

## 2018-03-13 RX ADMIN — Medication 10 ML: at 19:27

## 2018-03-13 RX ADMIN — MORPHINE SULFATE 2 MG: 4 INJECTION, SOLUTION INTRAMUSCULAR; INTRAVENOUS at 07:37

## 2018-03-13 RX ADMIN — DEXTROSE MONOHYDRATE 25 G: 500 INJECTION PARENTERAL at 07:09

## 2018-03-13 NOTE — PROGRESS NOTES
Pharmacy Automatic Renal Dosing Protocol - Antimicrobials    Indication for Antimicrobials: LLE cellulitis s/p I&D of wound,  no presence of osteo or abscess    Current Regimen of Each Antimicrobial:  Zosyn 3.375 gm IV every 8 hr (Start Date 3/11; Day # 3)  Vancomycin 1500 mg once then 1000 mg every 12 hr (Start Date 3/11; Day # 3)    Goal Level: VANCOMYCIN TROUGH GOAL RANGE    Vancomycin Trough: 10 - 15 mcg/mL    Measured / Extrapolated Vancomycin Level:   3/13: Trough level of 9.2 mcg/ml, this is a true trough    Significant Cultures:   3/11- Blood- NGTD, prelim  3/12- Wound Cx with gram stain: Moderate possible staph. aureus    Paralysis, amputations, malnutrition: no    Labs:  Recent Labs      18   0851  18   0334  18   0333  18   2249  18   1653   CREA   --   0.52*  0.55*  0.62*  1.18   BUN   --   7  9  9  10   WBC  8.0   --   9.0   --   13.0*     Temp (24hrs), Av.3 °F (36.8 °C), Min:98 °F (36.7 °C), Max:98.9 °F (37.2 °C)    Creatinine Clearance (mL/min) or Dialysis: >100 ml/min  No results found for: PCT    Impression/Plan:   · Patient possible discharge tomorrow, will need to follow-up on wound culture results for appropriate outpatient PO med. · Vancomycin trough level resulted as 9.2 mcg/ml, will increase vancomycin to 1250 mg Q8 hours for anticipated trough of 11.5 mcg/ml based on the patients known kinetics. · Zosyn dosed appropriately for renal function and indication     Pharmacy will follow daily and adjust medications as appropriate for renal function and/or serum levels.     Thank you,  Dwight Fowler, PHARMD

## 2018-03-13 NOTE — PROGRESS NOTES
Bedside shift report received from [facility]. CIRO, ADRIANNA, MAR, VS reviewed. 2000  Assessment as charted. Left foot dressing intact. Pt c/o pain. PRN meds given as ordered. Pt given a frozen meal since he is no longer nauseous and has scheduled insulin to take later. Tolerated meal with no N/V.     2330 VSS. Rests in bed. Antibiotics given as ordered. 0400  Labs drawn and sent. 1549 Pt c/o feeling \"shaky\" and has a headache and left foot pain. BGT = 77. Orange juice given, Dextrose 12.5g IV given and breakfast tray ordered stat.    6747  Rechecked bgt= 138.   0730   Bedside shift report to Leticia Garzon. SBAR,VS, MAR,KARDEX reviewed.

## 2018-03-13 NOTE — PROGRESS NOTES
Hospitalist Progress Note    NAME: Day Jackson   :  1980   MRN:  [id number]       Interim Hospital Summary: 30 y.o. male whom presented on 3/11/2018 with      Assessment / Plan:  Sepsis POA (tachycardia, leukocytosis)  Left Foot wound Infection/Cellulitis  I & D of Left foot (done by Dr. Mercedes Bay on 3/12/2018)  - daily wound care: flush the site with saline then packed with wet to dry dressing  - pt should be follow up with Dr. Gemini Varela one week from the discharge date ([place] office [phone])  - Wound cx: stap aureus, will continue with Zosyn and Vanc for now until the final result from the wound. Dr. Mercedes Bay is total of 14 days of ABT  - experienced nausea prior to receiving IV ABT: administer IV zofran 30 minutes prior to each IV antibiotic infusion  - WBC down to 8 from 13. Blood cx no growth so far    HTN  - -150's consistently. Was not on antihypertensive prior to admission  - started on ACEi 2.5mg daily, use labetalol prn. DM/HONKS  - no ketones, no gap, but  and hyponatremia upon admission; was on insulin drip upon admission then d/c'd on 3/12.   - HBA1C 12.9; pt seen by diabetic educator during this admission  - continue with Lantus (decresed to 38 units from 46 units which is pt's home dose due to fasting blood glucose of 78 prior to breakfast)  - check qac and qhs blood glucose then follow SSI    Hyponatremia  - resolved, Na 137    H/O Bipolar Ds: as per record but not on any meds, monitor. Smoker: counseled, start nicotine patch. Code Status: Full Code  Surrogate Decision Maker: Waleska Cleveland [phone]  DVT Prophylaxis: lovenox  GI Prophylaxis: not indicated  Baseline: Independent        Body mass index is 21.39 kg/(m^2).       Recommended Disposition: Home w/Family [address] for wound care follow up upon discharge    May transfer to telemetry bed     Subjective:     Chief Complaint / Reason for Physician Visit  \"I feel ok other than feeling sick before getting IV medication\". Discussed with RN events overnight. Review of Systems:  Symptom Y/N Comments  Symptom Y/N Comments   Fever/Chills n   Chest Pain n    Poor Appetite    Edema     Cough    Abdominal Pain     Sputum n   Joint Pain     SOB/JOHNSTON n   Pruritis/Rash     Nausea/vomit y   Tolerating PT/OT     Diarrhea n   Tolerating Diet     Constipation n   Other       Could NOT obtain due to:      Objective:     VITALS:   Last 24hrs VS reviewed since prior progress note. Most recent are:  Patient Vitals for the past 24 hrs:   Temp Pulse Resp BP SpO2   03/13/18 1114 98.4 °F (36.9 °C) 96 15 (!) 152/91 100 %   03/13/18 0727 98.3 °F (36.8 °C) 93 17 153/90 100 %   03/13/18 0327 98 °F (36.7 °C) 86 18 133/81 100 %   03/12/18 2307 98.4 °F (36.9 °C) 99 18 132/85 100 %   03/12/18 1926 98.4 °F (36.9 °C) (!) 101 16 132/77 100 %   03/12/18 1710 98 °F (36.7 °C) 94 16 126/79 100 %       Intake/Output Summary (Last 24 hours) at 03/13/18 1342  Last data filed at 03/13/18 0800   Gross per 24 hour   Intake              750 ml   Output              650 ml   Net              100 ml        PHYSICAL EXAM:  General: WD, WN. Alert, cooperative, no acute distress    EENT:  EOMI. Anicteric sclerae. MMM  Resp:  CTA bilaterally, no wheezing or rales. No accessory muscle use  CV:  Regular  rhythm,  No edema  GI:  Soft, Non distended, Non tender.  +Bowel sounds  Neurologic:  Alert and oriented X 3, normal speech,   Psych:   Good insight. Not anxious nor agitated  Skin:  Cellulitis of left foot plantar lesion; dressing dry and intact. Sensation intact. No rashes.   No jaundice    Reviewed most current lab test results and cultures  YES  Reviewed most current radiology test results   YES  Review and summation of old records today    NO  Reviewed patient's current orders and MAR    YES  PMH/SH reviewed - no change compared to H&P  ________________________________________________________________________  Care Plan discussed with:    Comments Patient y    Family      RN y    Care Manager y    Consultant  y Dr. Irene Perkins                    y Multidiciplinary team rounds were held today with , nursing, pharmacist and clinical coordinator. Patient's plan of care was discussed; medications were reviewed and discharge planning was addressed. ________________________________________________________________________  Total NON critical care TIME:  30   Minutes    Total CRITICAL CARE TIME Spent:   Minutes non procedure based      Comments   >50% of visit spent in counseling and coordination of care     ________________________________________________________________________  Aniya Love NP     Procedures: see electronic medical records for all procedures/Xrays and details which were not copied into this note but were reviewed prior to creation of Plan. LABS:  I reviewed today's most current labs and imaging studies.   Pertinent labs include:  Recent Labs      03/13/18   0851  03/12/18   0333  03/11/18   1653   WBC  8.0  9.0  13.0*   HGB  11.8*  11.4*  13.7   HCT  32.4*  32.1*  39.9   PLT  302  261  291     Recent Labs      03/13/18   0334  03/12/18   0333  03/11/18   2249  03/11/18   1653   NA  137  136  137  123*   K  3.7  3.6  3.7  4.7   CL  105  104  103  89*   CO2  25  25  29  29   GLU  91  158*  92  731*   BUN  7  9  9  10   CREA  0.52*  0.55*  0.62*  1.18   CA  8.4*  8.0*  8.1*  9.2   MG  1.7  1.9  1.9   --    PHOS   --    --    --   2.0*   ALB   --   2.4*   --   3.3*   TBILI   --   0.3   --   0.4   SGOT   --   11*   --   17   ALT   --   22   --   32       Signed: )Christian Ryan, NP

## 2018-03-14 ENCOUNTER — DOCUMENTATION ONLY (OUTPATIENT)
Dept: CASE MANAGEMENT | Age: 38
End: 2018-03-14

## 2018-03-14 VITALS
DIASTOLIC BLOOD PRESSURE: 75 MMHG | SYSTOLIC BLOOD PRESSURE: 120 MMHG | HEIGHT: 68 IN | RESPIRATION RATE: 17 BRPM | BODY MASS INDEX: 21.32 KG/M2 | HEART RATE: 91 BPM | WEIGHT: 140.65 LBS | TEMPERATURE: 98.1 F | OXYGEN SATURATION: 99 %

## 2018-03-14 LAB
ANION GAP SERPL CALC-SCNC: 6 MMOL/L (ref 5–15)
BACTERIA SPEC CULT: ABNORMAL
BUN SERPL-MCNC: 10 MG/DL (ref 6–20)
BUN/CREAT SERPL: 14 (ref 12–20)
CALCIUM SERPL-MCNC: 8.9 MG/DL (ref 8.5–10.1)
CHLORIDE SERPL-SCNC: 97 MMOL/L (ref 97–108)
CO2 SERPL-SCNC: 29 MMOL/L (ref 21–32)
CREAT SERPL-MCNC: 0.72 MG/DL (ref 0.7–1.3)
GLUCOSE BLD STRIP.AUTO-MCNC: 142 MG/DL (ref 65–100)
GLUCOSE BLD STRIP.AUTO-MCNC: 278 MG/DL (ref 65–100)
GLUCOSE SERPL-MCNC: 334 MG/DL (ref 65–100)
GRAM STN SPEC: ABNORMAL
GRAM STN SPEC: ABNORMAL
MAGNESIUM SERPL-MCNC: 1.9 MG/DL (ref 1.6–2.4)
POTASSIUM SERPL-SCNC: 3.8 MMOL/L (ref 3.5–5.1)
SERVICE CMNT-IMP: ABNORMAL
SODIUM SERPL-SCNC: 132 MMOL/L (ref 136–145)

## 2018-03-14 PROCEDURE — 74011250637 HC RX REV CODE- 250/637: Performed by: NURSE PRACTITIONER

## 2018-03-14 PROCEDURE — 36415 COLL VENOUS BLD VENIPUNCTURE: CPT | Performed by: INTERNAL MEDICINE

## 2018-03-14 PROCEDURE — 74011636637 HC RX REV CODE- 636/637: Performed by: INTERNAL MEDICINE

## 2018-03-14 PROCEDURE — 80048 BASIC METABOLIC PNL TOTAL CA: CPT | Performed by: INTERNAL MEDICINE

## 2018-03-14 PROCEDURE — 77030011256 HC DRSG MEPILEX <16IN NO BORD MOLN -A

## 2018-03-14 PROCEDURE — 74011250636 HC RX REV CODE- 250/636: Performed by: EMERGENCY MEDICINE

## 2018-03-14 PROCEDURE — 74011250636 HC RX REV CODE- 250/636: Performed by: INTERNAL MEDICINE

## 2018-03-14 PROCEDURE — 74011250637 HC RX REV CODE- 250/637: Performed by: INTERNAL MEDICINE

## 2018-03-14 PROCEDURE — 82962 GLUCOSE BLOOD TEST: CPT

## 2018-03-14 PROCEDURE — 83735 ASSAY OF MAGNESIUM: CPT | Performed by: INTERNAL MEDICINE

## 2018-03-14 RX ORDER — AMOXICILLIN AND CLAVULANATE POTASSIUM 875; 125 MG/1; MG/1
1 TABLET, FILM COATED ORAL EVERY 12 HOURS
Status: DISCONTINUED | OUTPATIENT
Start: 2018-03-14 | End: 2018-03-14 | Stop reason: HOSPADM

## 2018-03-14 RX ORDER — AMOXICILLIN AND CLAVULANATE POTASSIUM 875; 125 MG/1; MG/1
1 TABLET, FILM COATED ORAL EVERY 12 HOURS
Qty: 24 TAB | Refills: 0 | Status: SHIPPED | OUTPATIENT
Start: 2018-03-14 | End: 2018-03-26

## 2018-03-14 RX ORDER — ONDANSETRON 4 MG/1
TABLET, ORALLY DISINTEGRATING ORAL
Qty: 15 TAB | Refills: 0 | Status: SHIPPED | OUTPATIENT
Start: 2018-03-14 | End: 2018-06-08

## 2018-03-14 RX ORDER — OXYCODONE AND ACETAMINOPHEN 5; 325 MG/1; MG/1
1 TABLET ORAL
Qty: 15 TAB | Refills: 0 | Status: SHIPPED | OUTPATIENT
Start: 2018-03-14 | End: 2018-06-08

## 2018-03-14 RX ADMIN — INSULIN LISPRO 5 UNITS: 100 INJECTION, SOLUTION INTRAVENOUS; SUBCUTANEOUS at 11:21

## 2018-03-14 RX ADMIN — METOCLOPRAMIDE 5 MG: 5 INJECTION, SOLUTION INTRAMUSCULAR; INTRAVENOUS at 07:16

## 2018-03-14 RX ADMIN — Medication 1 CAPSULE: at 08:55

## 2018-03-14 RX ADMIN — AMOXICILLIN AND CLAVULANATE POTASSIUM 1 TABLET: 875; 125 TABLET, FILM COATED ORAL at 08:54

## 2018-03-14 RX ADMIN — OXYCODONE HYDROCHLORIDE AND ACETAMINOPHEN 1 TABLET: 5; 325 TABLET ORAL at 10:43

## 2018-03-14 RX ADMIN — VANCOMYCIN HYDROCHLORIDE 1250 MG: 10 INJECTION, POWDER, LYOPHILIZED, FOR SOLUTION INTRAVENOUS at 01:24

## 2018-03-14 RX ADMIN — Medication 10 ML: at 05:44

## 2018-03-14 RX ADMIN — INSULIN LISPRO 2 UNITS: 100 INJECTION, SOLUTION INTRAVENOUS; SUBCUTANEOUS at 08:56

## 2018-03-14 RX ADMIN — LISINOPRIL 2.5 MG: 5 TABLET ORAL at 08:55

## 2018-03-14 RX ADMIN — OXYCODONE HYDROCHLORIDE AND ACETAMINOPHEN 1 TABLET: 5; 325 TABLET ORAL at 05:44

## 2018-03-14 NOTE — PROGRESS NOTES
Attempted to reach patient via telephone, left message to call this nurse to verify PCP and schedule f/u appt. Attempted to verify PCP at [facility] in [place], [place] with Dr. Serina Church. Patient has not been seen in over a year at this facility and his record had been closed due to No Show for last scheduled appointment. Will attempt to reach patient again.

## 2018-03-14 NOTE — DISCHARGE SUMMARY
Hospitalist Discharge Summary     Patient ID:  Nirav Valles  [id number]  16 y.o.  1980    PCP on record: Stephanie Agosto MD    Admit date: 3/11/2018  Discharge date: 3/16/2018       DISCHARGE DIAGNOSES  DISCHARGE SUMMARY/HOSPITAL COURSE: for full details see H&P, daily progress notes, labs, consult notes. Sepsis due to left foot cellulitis with abscess  S/p I&D of wound (done by Dr. Mario Meier on 3/12/2018)  - pt should follow up with Dr. Erlin De Jesus one week from the discharge date [place] office [phone])  -2 week course of augmentin for staph aureus on culture  -pain meds for acute pain    HTN     DM/HONKS  -required insulin drip on admission then transitioned to home lantus     Hyponatremia     H/O Bipolar Ds: as per record but not on any meds, monitor. Smoker counseled     Code Status: Full Code      CONSULTATIONS:  IP CONSULT TO PODIATRY    Excerpted HPI from H&P of Raina Celeste MD:  Joycelyn Medrano is a 30 y.o.  male  with PMHx significant for DM and MRSA, presents ambulatory to the ED for evaluation of a gradually worsening puncture wound to the bottom of his left foot. Per review of old records, the puncture wound initially occurred on 3/4/18 when the patient stepped on a nail. The patient went to this ED for evaluation of the wound with worsening redness and was evaluated and prescribed Levaquin and Doxycycline. Today, patient states he came back in for reevaluation because he is having worsening redness and swelling and continued pain around his left foot puncture wound. Patient admits to tobacco abuse. He denies any additional associated symptoms at this time. At this time patient is lying in bed c/o pain, swelling, erythema in left foot, feeling weak and having malaise, denies chest pain, no SOB, no fever, no N/V no diarrhea, no urinary symptoms, no other associated symptoms. We were asked to admit for work up and evaluation of the above problems.          _______________________________________________________________________  Patient seen and examined by me on discharge day. Pertinent Findings:  Gen:    Not in distress  Neuro:  Alert, calm  Skin: Foot wound without discharge, healing  _______________________________________________________________________  DISCHARGE MEDICATIONS:   Current Discharge Medication List      START taking these medications    Details   amoxicillin-clavulanate (AUGMENTIN) 875-125 mg per tablet Take 1 Tab by mouth every twelve (12) hours for 12 days. Qty: 24 Tab, Refills: 0      oxyCODONE-acetaminophen (PERCOCET) 5-325 mg per tablet Take 1 Tab by mouth every four (4) hours as needed. Max Daily Amount: 6 Tabs. Qty: 15 Tab, Refills: 0    Associated Diagnoses: Puncture wound      ondansetron (ZOFRAN ODT) 4 mg disintegrating tablet Place one tab under tongue every 6 hours as needed for nausea  Qty: 15 Tab, Refills: 0         CONTINUE these medications which have NOT CHANGED    Details   insulin regular (NOVOLIN R) 100 unit/mL injection by SubCUTAneous route as needed (>200 give 6 units, >300 give 8 units, >400 give 10 units). insulin glargine (LANTUS) 100 unit/mL injection 46 Units by SubCUTAneous route daily. ibuprofen (MOTRIN) 800 mg tablet Take 1 Tab by mouth every eight (8) hours as needed for Pain. Qty: 30 Tab, Refills: 0         STOP taking these medications       levoFLOXacin (LEVAQUIN) 750 mg tablet Comments:   Reason for Stopping:         doxycycline (VIBRA-TABS) 100 mg tablet Comments:   Reason for Stopping:               My Recommended Diet, Activity, Wound Care, and follow-up labs are listed in the patient's Discharge Insturctions which I have personally completed and reviewed.     _______________________________________________________________________  DISPOSITION:    Home with Family: x   Home with HH/PT/OT/RN:    SNF/LTC:    CATHIE:    OTHER:        Condition at Discharge: Stable  _______________________________________________________________________  Follow up with:   PCP : Stephanie Agosto MD  Follow-up Information     Follow up With Details Comments Contact Info    Stephanie Agosto MD   Patient can only remember the practice name and not the physician      Dr. Samm Douglass MD   [address]  [phone]                Total time in minutes spent coordinating this discharge (includes going over instructions, follow-up, prescriptions, and preparing report for sign off to her PCP) :  35 minutes    Signed:  Janeth Retana MD

## 2018-03-14 NOTE — PROGRESS NOTES
1915  Bedside shift report received from [facility]. CIRO,MAR, KARDEX, VS reviewed. 1935 Paged Norm Space regarding pt's unrelenting nausea and inability to eat and anticipating low bgts d/t dx: DM1. New orders received for Reglan. 2130 Pt states he feels much better, is able to eat. Given a frozen dinner and crackers to consume. Tolerated well. Scheduled Lantus given as ordered. Snack provided for later. 2200 Pt refused Zosyn, states it makes him extremely nauseous and gives him a headache. Educated on importance of antibiotic therapy for the healing of his foot wound. Pt agrees to take the Vancomycin later. Wishes to speak to MD tomorrow for po ABX. 0130 Rests quietly. New PIV inserted d/t infiltration of the old one.  0715 Bedside shift report given to [facility]. CIRO, MAR,VS, KARDEX reviewed.

## 2018-03-14 NOTE — PROGRESS NOTES
CM Initial Assessment        PMHX-- Significant for dm and mrsa. Current admission assessment-- Patient came to ed for evaluation of worsening puncture wound to bottom of his left foot. Patient lives in two story home with his firenny. He was independent prior to coming to the hospital. He does not use oxygen. He denies use of dme . He has never used home health services or snf in the past. He was going to the [facility] in [place] however he states he was making too much money and they let him go. He has a pcp in [facility] who he states he will set up the appointment when he arrives home. He did not remember the name of the pcp. Patient is being discharged home today with his firenny . Care Management Interventions  PCP Verified by CM: Yes (Patient has a new pcp who he will set his appointment up when he arrives home. )  Mode of Transport at Discharge: Other (see comment) (Patient is being discharged to home today by [facility].)  Discharge Durable Medical Equipment:  (Patient has glucometer at home. )  Physical Therapy Consult: Yes  Occupational Therapy Consult: Yes  Current Support Network:  Other (Lives with his jah. )  Confirm Follow Up Transport: Other (see comment) (Spoke with his firenny who is in the room with him and he gave permission. )  Plan discussed with Pt/Family/Caregiver: Yes  Discharge Location  Discharge Placement: Home                 Rock RNBSNCRM EXT [id number]

## 2018-03-14 NOTE — PROGRESS NOTES
1100 - ALL D/C INFORMATION INCLUDING MD FOLLOW UP AND ABX REGIMEN EXPRESSED IN DETAIL, ALL OTHER HOME MEDICATIONS EXPLAINED IN DETAIL. WOUND CARE INCLUDING BUT NOT LIMITED TO AT LEAST 3 DAYS POST DISCHARGE FOR CLEANING PER D/C PAPERWORK. PT VERBALIZED UNDERSTANDING OF ALL DISCHARGE INSTRUCTIONS AT THIS TIME. TIME ALLOWED FOR QUESTIONS AND CLARIFICATION. NO FURTHER CONCERNS. PT AWAITING LUNCH TRAY AND LUNCH INSULIN PRIOR TO D/C HOME.     5274 - PT D/C HOME AT THIS TIME WITH ALL BELONGINGS IN POSSESSION INCLUDING CELL PHONE, EYEGLASSES, AND CLOTHING. PT AMBULATED TO PRIVATE VEHICLE FOR TRANSPORT HOME WITH NO COMPLICATIONS.

## 2018-03-14 NOTE — DISCHARGE INSTRUCTIONS
Please make an appointment with the foot doctor, call [phone] when discharged     Puncture Wounds: Care Instructions  Your Care Instructions    A puncture wound can happen anywhere on your body. These wounds tend to be narrower and deeper than cuts. A puncture wound is usually left open instead of being closed. This is because a puncture wound can be easily infected, and closing it can make infection even more likely. You will probably have a bandage over the wound. The doctor has checked you carefully, but problems can develop later. If you notice any problems or new symptoms, get medical treatment right away. Follow-up care is a key part of your treatment and safety. Be sure to make and go to all appointments, and call your doctor if you are having problems. It's also a good idea to know your test results and keep a list of the medicines you take. How can you care for yourself at home? · Keep the wound dry for the first 24 to 48 hours. After this, you can shower if your doctor okays it. Pat the wound dry. · Don't soak the wound, such as in a bathtub. Your doctor will tell you when it's safe to get the wound wet. · If your doctor told you how to care for your wound, follow your doctor's instructions. If you did not get instructions, follow this general advice:  ¨ After the first 24 to 48 hours, wash the wound with clean water 2 times a day. Don't use hydrogen peroxide or alcohol, which can slow healing. ¨ You may cover the wound with a thin layer of petroleum jelly, such as Vaseline, and a nonstick bandage. ¨ Apply more petroleum jelly and replace the bandage as needed. · Prop up the sore area on pillows anytime you sit or lie down during the next 3 days. Try to keep it above the level of your heart. This helps reduce swelling. · Avoid any activity that could cause your wound to get worse. · Be safe with medicines. Read and follow all instructions on the label.   ¨ If the doctor gave you a prescription medicine for pain, take it as prescribed. ¨ If you are not taking a prescription pain medicine, ask your doctor if you can take an over-the-counter medicine. · If your doctor prescribed antibiotics, take them as directed. Do not stop taking them just because you feel better. You need to take the full course of antibiotics. When should you call for help? Call your doctor now or seek immediate medical care if:  ? · You have new pain, or your pain gets worse. ? · The wound starts to bleed, and blood soaks through the bandage. Oozing small amounts of blood is normal.   ? · The skin near the wound is cold or pale or changes color. ? · You have tingling, weakness, or numbness near the wound. ? · You have trouble moving the area near the wound. ? · You have symptoms of infection, such as:  ¨ Increased pain, swelling, warmth, or redness around the wound. ¨ Red streaks leading from the wound. ¨ Pus draining from the wound. ¨ A fever. ? Watch closely for changes in your health, and be sure to contact your doctor if:  ? · The wound is not closing (getting smaller). ? · You do not get better as expected. Where can you learn more? Go to http://dwaine-sanjeev.info/. Enter N786 in the search box to learn more about \"Puncture Wounds: Care Instructions. \"  Current as of: March 20, 2017  Content Version: 11.4  © 3757-5291 [employer]. Care instructions adapted under license by [employer] (which disclaims liability or warranty for this information). If you have questions about a medical condition or this instruction, always ask your healthcare professional. [address] any warranty or liability for your use of this information.

## 2018-03-16 LAB
BACTERIA SPEC CULT: NORMAL
SERVICE CMNT-IMP: NORMAL

## 2018-03-29 NOTE — ADT AUTH CERT NOTES
Hospitalist Discharge Summary      Patient ID:  Alecia Aus  [id number]  3 y.o.  1980     PCP on record: Stephanie Agosto MD     Admit date: 3/11/2018  Discharge date: 3/14/2018         DISCHARGE DIAGNOSES  DISCHARGE SUMMARY/HOSPITAL COURSE: for full details see H&P, daily progress notes, labs, consult notes.      Sepsis due to left foot cellulitis with abscess  S/p I&D of wound (done by Dr. Karlie Whatley on 3/12/2018)  - pt should follow up with Dr. Lenora Boateng one week from the discharge date ([place] office [phone])  -2 week course of augmentin for staph aureus on culture  -pain meds for acute pain     HTN      DM/HONKS  -required insulin drip on admission then transitioned to home lantus      Hyponatremia      H/O Bipolar Ds: as per record but not on any meds, monitor.     Smoker counseled      Code Status: Full Code        CONSULTATIONS:  IP CONSULT TO PODIATRY     Excerpted HPI from H&P of Jose Parisi MD:  [place] is a 30 y. o.   male  with PMHx significant for DM and MRSA, presents ambulatory to the ED for evaluation of a gradually worsening puncture wound to the bottom of his left foot. Per review of old records, the puncture wound initially occurred on 3/4/18 when the patient stepped on a nail. The patient went to this ED for evaluation of the wound with worsening redness and was evaluated and prescribed Levaquin and Doxycycline. Today, patient states he came back in for reevaluation because he is having worsening redness and swelling and continued pain around his left foot puncture wound. Patient admits to tobacco abuse. He denies any additional associated symptoms at this time. At this time patient is lying in bed c/o pain, swelling, erythema in left foot, feeling weak and having malaise, denies chest pain, no SOB, no fever, no N/V no diarrhea, no urinary symptoms, no other associated symptoms. We were asked to admit for work up and evaluation of the above problems.             _______________________________________________________________________  Patient seen and examined by me on discharge day. Pertinent Findings:  Gen:              Not in distress  Neuro:  Alert, calm  Skin:              Foot wound without discharge, healing  _______________________________________________________________________  DISCHARGE MEDICATIONS:         Current Discharge Medication List             START taking these medications     Details   amoxicillin-clavulanate (AUGMENTIN) 875-125 mg per tablet Take 1 Tab by mouth every twelve (12) hours for 12 days. Qty: 24 Tab, Refills: 0       oxyCODONE-acetaminophen (PERCOCET) 5-325 mg per tablet Take 1 Tab by mouth every four (4) hours as needed. Max Daily Amount: 6 Tabs. Qty: 15 Tab, Refills: 0     Associated Diagnoses: Puncture wound       ondansetron (ZOFRAN ODT) 4 mg disintegrating tablet Place one tab under tongue every 6 hours as needed for nausea  Qty: 15 Tab, Refills: 0                 CONTINUE these medications which have NOT CHANGED     Details   insulin regular (NOVOLIN R) 100 unit/mL injection by SubCUTAneous route as needed (>200 give 6 units, >300 give 8 units, >400 give 10 units).       insulin glargine (LANTUS) 100 unit/mL injection 46 Units by SubCUTAneous route daily.       ibuprofen (MOTRIN) 800 mg tablet Take 1 Tab by mouth every eight (8) hours as needed for Pain.   Qty: 30 Tab, Refills: 0                STOP taking these medications         levoFLOXacin (LEVAQUIN) 750 mg tablet Comments:   Reason for Stopping:            doxycycline (VIBRA-TABS) 100 mg tablet Comments:   Reason for Stopping:                    My Recommended Diet, Activity, Wound Care, and follow-up labs are listed in the patient's Discharge Insturctions which I have personally completed and reviewed.     _______________________________________________________________________  DISPOSITION:     Home with Family: x   Home with HH/PT/OT/RN:     SNF/LTC:     CATHIE:   OTHER:           Condition at Discharge:  Stable  _______________________________________________________________________  Follow up with:   PCP : Stephanie Agosto MD  Follow-up Information     Follow up With Details Comments Contact Info     Stephanie Agosto MD     Patient can only remember the practice name and not the physician     Dr. Loretta Sharif MD     [address] [place] [address]  [phone]                  Total time in minutes spent coordinating this discharge (includes going over instructions, follow-up, prescriptions, and preparing report for sign off to her PCP) :  35 minutes     Signed:  Osvaldo Hoyos MD

## 2018-06-08 ENCOUNTER — APPOINTMENT (OUTPATIENT)
Dept: GENERAL RADIOLOGY | Age: 38
End: 2018-06-08
Attending: EMERGENCY MEDICINE
Payer: COMMERCIAL

## 2018-06-08 ENCOUNTER — HOSPITAL ENCOUNTER (OUTPATIENT)
Age: 38
Setting detail: OBSERVATION
Discharge: HOME | End: 2018-06-11
Attending: EMERGENCY MEDICINE | Admitting: HOSPITALIST
Payer: COMMERCIAL

## 2018-06-08 DIAGNOSIS — L97.529 ULCER OF LEFT FOOT, UNSPECIFIED ULCER STAGE (HCC): ICD-10-CM

## 2018-06-08 DIAGNOSIS — E10.10 TYPE 1 DIABETES MELLITUS WITH KETOACIDOSIS WITHOUT COMA (HCC): Primary | ICD-10-CM

## 2018-06-08 DIAGNOSIS — R11.2 INTRACTABLE VOMITING WITH NAUSEA, UNSPECIFIED VOMITING TYPE: ICD-10-CM

## 2018-06-08 LAB
ADMINISTERED INITIALS, ADMINIT: NORMAL
ALBUMIN SERPL-MCNC: 4 G/DL (ref 3.5–5)
ALBUMIN/GLOB SERPL: 1.2 {RATIO} (ref 1.1–2.2)
ALP SERPL-CCNC: 163 U/L (ref 45–117)
ALT SERPL-CCNC: 59 U/L (ref 12–78)
AMPHET UR QL SCN: NEGATIVE
ANION GAP SERPL CALC-SCNC: 10 MMOL/L (ref 5–15)
ANION GAP SERPL CALC-SCNC: 20 MMOL/L (ref 5–15)
APPEARANCE UR: CLEAR
AST SERPL-CCNC: 39 U/L (ref 15–37)
ATRIAL RATE: 96 BPM
BACTERIA URNS QL MICRO: NEGATIVE /HPF
BARBITURATES UR QL SCN: NEGATIVE
BASOPHILS # BLD: 0 K/UL (ref 0–0.1)
BASOPHILS NFR BLD: 1 % (ref 0–1)
BENZODIAZ UR QL: NEGATIVE
BILIRUB SERPL-MCNC: 0.9 MG/DL (ref 0.2–1)
BILIRUB UR QL: NEGATIVE
BUN SERPL-MCNC: 21 MG/DL (ref 6–20)
BUN SERPL-MCNC: 29 MG/DL (ref 6–20)
BUN/CREAT SERPL: 26 (ref 12–20)
BUN/CREAT SERPL: 26 (ref 12–20)
CALCIUM SERPL-MCNC: 8.7 MG/DL (ref 8.5–10.1)
CALCIUM SERPL-MCNC: 9.8 MG/DL (ref 8.5–10.1)
CALCULATED P AXIS, ECG09: 64 DEGREES
CALCULATED R AXIS, ECG10: 78 DEGREES
CALCULATED T AXIS, ECG11: 57 DEGREES
CANNABINOIDS UR QL SCN: NEGATIVE
CHLORIDE SERPL-SCNC: 104 MMOL/L (ref 97–108)
CHLORIDE SERPL-SCNC: 86 MMOL/L (ref 97–108)
CK MB CFR SERPL CALC: 3 % (ref 0–2.5)
CK MB SERPL-MCNC: 6.4 NG/ML (ref 5–25)
CK SERPL-CCNC: 213 U/L (ref 39–308)
CO2 SERPL-SCNC: 20 MMOL/L (ref 21–32)
CO2 SERPL-SCNC: 27 MMOL/L (ref 21–32)
COCAINE UR QL SCN: NEGATIVE
COLOR UR: ABNORMAL
CREAT SERPL-MCNC: 0.82 MG/DL (ref 0.7–1.3)
CREAT SERPL-MCNC: 1.1 MG/DL (ref 0.7–1.3)
D50 ADMINISTERED, D50ADM: 0 ML
D50 ORDER, D50ORD: 0 ML
DIAGNOSIS, 93000: NORMAL
DIFFERENTIAL METHOD BLD: NORMAL
DRUG SCRN COMMENT,DRGCM: NORMAL
EOSINOPHIL # BLD: 0.1 K/UL (ref 0–0.4)
EOSINOPHIL NFR BLD: 2 % (ref 0–7)
EPITH CASTS URNS QL MICRO: NORMAL /LPF
ERYTHROCYTE [DISTWIDTH] IN BLOOD BY AUTOMATED COUNT: 12.4 % (ref 11.5–14.5)
GLOBULIN SER CALC-MCNC: 3.3 G/DL (ref 2–4)
GLSCOM COMMENTS: NORMAL
GLUCOSE BLD STRIP.AUTO-MCNC: 119 MG/DL (ref 65–100)
GLUCOSE BLD STRIP.AUTO-MCNC: 120 MG/DL (ref 65–100)
GLUCOSE BLD STRIP.AUTO-MCNC: 164 MG/DL (ref 65–100)
GLUCOSE BLD STRIP.AUTO-MCNC: 165 MG/DL (ref 65–100)
GLUCOSE BLD STRIP.AUTO-MCNC: 175 MG/DL (ref 65–100)
GLUCOSE BLD STRIP.AUTO-MCNC: 208 MG/DL (ref 65–100)
GLUCOSE BLD STRIP.AUTO-MCNC: 295 MG/DL (ref 65–100)
GLUCOSE BLD STRIP.AUTO-MCNC: 476 MG/DL (ref 65–100)
GLUCOSE BLD STRIP.AUTO-MCNC: >600 MG/DL (ref 65–100)
GLUCOSE BLD STRIP.AUTO-MCNC: >600 MG/DL (ref 65–100)
GLUCOSE SERPL-MCNC: 121 MG/DL (ref 65–100)
GLUCOSE SERPL-MCNC: 693 MG/DL (ref 65–100)
GLUCOSE UR STRIP.AUTO-MCNC: >1000 MG/DL
GLUCOSE, GLC: 119 MG/DL
GLUCOSE, GLC: 120 MG/DL
GLUCOSE, GLC: 164 MG/DL
GLUCOSE, GLC: 165 MG/DL
GLUCOSE, GLC: 175 MG/DL
GLUCOSE, GLC: 208 MG/DL
GLUCOSE, GLC: 295 MG/DL
GLUCOSE, GLC: 476 MG/DL
HCT VFR BLD AUTO: 40.7 % (ref 36.6–50.3)
HGB BLD-MCNC: 14.5 G/DL (ref 12.1–17)
HGB UR QL STRIP: ABNORMAL
HIGH TARGET, HITG: 250 MG/DL
HIGH TARGET, HITG: 300 MG/DL
HIGH TARGET, HITG: 300 MG/DL
IMM GRANULOCYTES # BLD: 0 K/UL (ref 0–0.04)
IMM GRANULOCYTES NFR BLD AUTO: 0 % (ref 0–0.5)
INSULIN ADMINSTERED, INSADM: 0 UNITS/HOUR
INSULIN ADMINSTERED, INSADM: 0.1 UNITS/HOUR
INSULIN ADMINSTERED, INSADM: 0.6 UNITS/HOUR
INSULIN ADMINSTERED, INSADM: 2.1 UNITS/HOUR
INSULIN ADMINSTERED, INSADM: 4.7 UNITS/HOUR
INSULIN ADMINSTERED, INSADM: 8.3 UNITS/HOUR
INSULIN ORDER, INSORD: 0 UNITS/HOUR
INSULIN ORDER, INSORD: 0.1 UNITS/HOUR
INSULIN ORDER, INSORD: 0.6 UNITS/HOUR
INSULIN ORDER, INSORD: 2.1 UNITS/HOUR
INSULIN ORDER, INSORD: 4.7 UNITS/HOUR
INSULIN ORDER, INSORD: 8.3 UNITS/HOUR
KETONES UR QL STRIP.AUTO: >80 MG/DL
LEUKOCYTE ESTERASE UR QL STRIP.AUTO: NEGATIVE
LIPASE SERPL-CCNC: 86 U/L (ref 73–393)
LOW TARGET, LOT: 150 MG/DL
LOW TARGET, LOT: 200 MG/DL
LOW TARGET, LOT: 200 MG/DL
LYMPHOCYTES # BLD: 1.7 K/UL (ref 0.8–3.5)
LYMPHOCYTES NFR BLD: 24 % (ref 12–49)
MAGNESIUM SERPL-MCNC: 2.1 MG/DL (ref 1.6–2.4)
MCH RBC QN AUTO: 32.6 PG (ref 26–34)
MCHC RBC AUTO-ENTMCNC: 35.6 G/DL (ref 30–36.5)
MCV RBC AUTO: 91.5 FL (ref 80–99)
METHADONE UR QL: NEGATIVE
MINUTES UNTIL NEXT BG, NBG: 60 MIN
MONOCYTES # BLD: 0.7 K/UL (ref 0–1)
MONOCYTES NFR BLD: 10 % (ref 5–13)
MULTIPLIER, MUL: 0
MULTIPLIER, MUL: 0.01
MULTIPLIER, MUL: 0.02
NEUTS SEG # BLD: 4.5 K/UL (ref 1.8–8)
NEUTS SEG NFR BLD: 63 % (ref 32–75)
NITRITE UR QL STRIP.AUTO: NEGATIVE
NRBC # BLD: 0 K/UL (ref 0–0.01)
NRBC BLD-RTO: 0 PER 100 WBC
OPIATES UR QL: NEGATIVE
ORDER INITIALS, ORDINIT: NORMAL
P-R INTERVAL, ECG05: 142 MS
PCP UR QL: NEGATIVE
PH UR STRIP: 6 [PH] (ref 5–8)
PHOSPHATE SERPL-MCNC: 2.3 MG/DL (ref 2.6–4.7)
PLATELET # BLD AUTO: 296 K/UL (ref 150–400)
PMV BLD AUTO: 10.8 FL (ref 8.9–12.9)
POTASSIUM SERPL-SCNC: 3.6 MMOL/L (ref 3.5–5.1)
POTASSIUM SERPL-SCNC: 5 MMOL/L (ref 3.5–5.1)
PROT SERPL-MCNC: 7.3 G/DL (ref 6.4–8.2)
PROT UR STRIP-MCNC: NEGATIVE MG/DL
Q-T INTERVAL, ECG07: 362 MS
QRS DURATION, ECG06: 98 MS
QTC CALCULATION (BEZET), ECG08: 457 MS
RBC # BLD AUTO: 4.45 M/UL (ref 4.1–5.7)
RBC #/AREA URNS HPF: NORMAL /HPF (ref 0–5)
SERVICE CMNT-IMP: ABNORMAL
SODIUM SERPL-SCNC: 126 MMOL/L (ref 136–145)
SODIUM SERPL-SCNC: 141 MMOL/L (ref 136–145)
SP GR UR REFRACTOMETRY: 1.01 (ref 1–1.03)
TROPONIN I SERPL-MCNC: <0.04 NG/ML
UROBILINOGEN UR QL STRIP.AUTO: 0.2 EU/DL (ref 0.2–1)
VENTRICULAR RATE, ECG03: 96 BPM
WBC # BLD AUTO: 7 K/UL (ref 4.1–11.1)
WBC URNS QL MICRO: NORMAL /HPF (ref 0–4)

## 2018-06-08 PROCEDURE — 74011636637 HC RX REV CODE- 636/637: Performed by: EMERGENCY MEDICINE

## 2018-06-08 PROCEDURE — 83735 ASSAY OF MAGNESIUM: CPT | Performed by: HOSPITALIST

## 2018-06-08 PROCEDURE — 80048 BASIC METABOLIC PNL TOTAL CA: CPT | Performed by: HOSPITALIST

## 2018-06-08 PROCEDURE — 82962 GLUCOSE BLOOD TEST: CPT

## 2018-06-08 PROCEDURE — 96365 THER/PROPH/DIAG IV INF INIT: CPT

## 2018-06-08 PROCEDURE — 96375 TX/PRO/DX INJ NEW DRUG ADDON: CPT

## 2018-06-08 PROCEDURE — 99218 HC RM OBSERVATION: CPT

## 2018-06-08 PROCEDURE — 96366 THER/PROPH/DIAG IV INF ADDON: CPT

## 2018-06-08 PROCEDURE — 36415 COLL VENOUS BLD VENIPUNCTURE: CPT | Performed by: HOSPITALIST

## 2018-06-08 PROCEDURE — 99285 EMERGENCY DEPT VISIT HI MDM: CPT

## 2018-06-08 PROCEDURE — 73620 X-RAY EXAM OF FOOT: CPT

## 2018-06-08 PROCEDURE — 84100 ASSAY OF PHOSPHORUS: CPT | Performed by: HOSPITALIST

## 2018-06-08 PROCEDURE — 83690 ASSAY OF LIPASE: CPT | Performed by: EMERGENCY MEDICINE

## 2018-06-08 PROCEDURE — 84484 ASSAY OF TROPONIN QUANT: CPT | Performed by: EMERGENCY MEDICINE

## 2018-06-08 PROCEDURE — 74011250636 HC RX REV CODE- 250/636: Performed by: EMERGENCY MEDICINE

## 2018-06-08 PROCEDURE — 82550 ASSAY OF CK (CPK): CPT | Performed by: EMERGENCY MEDICINE

## 2018-06-08 PROCEDURE — 65660000000 HC RM CCU STEPDOWN

## 2018-06-08 PROCEDURE — 74011000258 HC RX REV CODE- 258: Performed by: HOSPITALIST

## 2018-06-08 PROCEDURE — 93005 ELECTROCARDIOGRAM TRACING: CPT

## 2018-06-08 PROCEDURE — 81001 URINALYSIS AUTO W/SCOPE: CPT | Performed by: EMERGENCY MEDICINE

## 2018-06-08 PROCEDURE — 74011000250 HC RX REV CODE- 250: Performed by: EMERGENCY MEDICINE

## 2018-06-08 PROCEDURE — 85025 COMPLETE CBC W/AUTO DIFF WBC: CPT | Performed by: EMERGENCY MEDICINE

## 2018-06-08 PROCEDURE — 74011000258 HC RX REV CODE- 258: Performed by: EMERGENCY MEDICINE

## 2018-06-08 PROCEDURE — 74011250636 HC RX REV CODE- 250/636: Performed by: HOSPITALIST

## 2018-06-08 PROCEDURE — 80053 COMPREHEN METABOLIC PANEL: CPT | Performed by: EMERGENCY MEDICINE

## 2018-06-08 PROCEDURE — 96374 THER/PROPH/DIAG INJ IV PUSH: CPT

## 2018-06-08 PROCEDURE — 74022 RADEX COMPL AQT ABD SERIES: CPT

## 2018-06-08 PROCEDURE — 80307 DRUG TEST PRSMV CHEM ANLYZR: CPT | Performed by: HOSPITALIST

## 2018-06-08 RX ORDER — SODIUM CHLORIDE 0.9 % (FLUSH) 0.9 %
5-10 SYRINGE (ML) INJECTION AS NEEDED
Status: DISCONTINUED | OUTPATIENT
Start: 2018-06-08 | End: 2018-06-11 | Stop reason: HOSPADM

## 2018-06-08 RX ORDER — ONDANSETRON 2 MG/ML
4 INJECTION INTRAMUSCULAR; INTRAVENOUS
Status: COMPLETED | OUTPATIENT
Start: 2018-06-08 | End: 2018-06-08

## 2018-06-08 RX ORDER — DEXTROSE MONOHYDRATE AND SODIUM CHLORIDE 5; .9 G/100ML; G/100ML
50 INJECTION, SOLUTION INTRAVENOUS CONTINUOUS
Status: DISCONTINUED | OUTPATIENT
Start: 2018-06-08 | End: 2018-06-10

## 2018-06-08 RX ORDER — SODIUM CHLORIDE 0.9 % (FLUSH) 0.9 %
5-10 SYRINGE (ML) INJECTION EVERY 8 HOURS
Status: DISCONTINUED | OUTPATIENT
Start: 2018-06-08 | End: 2018-06-11 | Stop reason: HOSPADM

## 2018-06-08 RX ORDER — ACETAMINOPHEN 325 MG/1
650 TABLET ORAL
Status: DISCONTINUED | OUTPATIENT
Start: 2018-06-08 | End: 2018-06-11 | Stop reason: HOSPADM

## 2018-06-08 RX ORDER — SODIUM CHLORIDE 9 MG/ML
150 INJECTION, SOLUTION INTRAVENOUS CONTINUOUS
Status: DISCONTINUED | OUTPATIENT
Start: 2018-06-08 | End: 2018-06-08

## 2018-06-08 RX ORDER — ENOXAPARIN SODIUM 100 MG/ML
40 INJECTION SUBCUTANEOUS EVERY 24 HOURS
Status: DISCONTINUED | OUTPATIENT
Start: 2018-06-08 | End: 2018-06-11 | Stop reason: HOSPADM

## 2018-06-08 RX ORDER — ACETAMINOPHEN 500 MG
1000 TABLET ORAL
COMMUNITY
End: 2019-04-14

## 2018-06-08 RX ORDER — ONDANSETRON 2 MG/ML
4 INJECTION INTRAMUSCULAR; INTRAVENOUS
Status: DISCONTINUED | OUTPATIENT
Start: 2018-06-08 | End: 2018-06-11 | Stop reason: HOSPADM

## 2018-06-08 RX ADMIN — DEXTROSE MONOHYDRATE AND SODIUM CHLORIDE 150 ML/HR: 5; .9 INJECTION, SOLUTION INTRAVENOUS at 18:09

## 2018-06-08 RX ADMIN — ONDANSETRON 4 MG: 2 INJECTION INTRAMUSCULAR; INTRAVENOUS at 11:27

## 2018-06-08 RX ADMIN — PROCHLORPERAZINE EDISYLATE 10 MG: 5 INJECTION INTRAMUSCULAR; INTRAVENOUS at 12:45

## 2018-06-08 RX ADMIN — Medication 5 ML: at 14:00

## 2018-06-08 RX ADMIN — SODIUM CHLORIDE 8.3 UNITS/HR: 900 INJECTION, SOLUTION INTRAVENOUS at 13:56

## 2018-06-08 RX ADMIN — INSULIN HUMAN 10 UNITS: 100 INJECTION, SOLUTION PARENTERAL at 11:27

## 2018-06-08 RX ADMIN — SODIUM CHLORIDE 150 ML/HR: 900 INJECTION, SOLUTION INTRAVENOUS at 13:56

## 2018-06-08 NOTE — ED NOTES
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

## 2018-06-08 NOTE — ED NOTES
TRANSFER - OUT REPORT:    Verbal report given to Juan Mayorga RN(name) on Kane Lav  being transferred to PCU(unit) for routine progression of care       Report consisted of patients Situation, Background, Assessment and   Recommendations(SBAR). Information from the following report(s) SBAR, ED Summary and Recent Results was reviewed with the receiving nurse. Lines:   Peripheral IV 06/08/18 Right Antecubital (Active)   Site Assessment Clean, dry, & intact 6/8/2018 11:03 AM   Phlebitis Assessment 0 6/8/2018 11:03 AM   Infiltration Assessment 0 6/8/2018 11:03 AM   Dressing Status Clean, dry, & intact 6/8/2018 11:03 AM   Hub Color/Line Status Pink 6/8/2018 11:03 AM        Opportunity for questions and clarification was provided.       Patient transported with:   Monitor  Registered Nurse

## 2018-06-08 NOTE — ED PROVIDER NOTES
EMERGENCY DEPARTMENT HISTORY AND PHYSICAL EXAM      Date: 6/8/2018  Patient Name: Sivan Barron    History of Presenting Illness     Chief Complaint   Patient presents with    Vomiting     pt presents in triage with reports of vomiting x3 days, pt eating ice, reports he has been working outside and possibly became overheated     History Provided By: Patient    HPI: Sivan Barron, 30 y.o. male with PMHx significant for IDDM, DKA, bipolar disorder, seizure, MRSA, FMhx for maternal DM, presents ambulatory to the ED with cc of numerous episodes of persistent N/V with associated lightheadedness and fatigue that began three days ago. Pt is unable to tolerate solids or fluids. He also c/o intermittent \"heartburn\" prior to emesis with pain that radiates to his back and SOB. Pt notes an increase in thirst and diaphoresis but mentions that he works in [place] outside. He endorses compliance with insulin, taking his routine dose this morning. Pt specifically denies abdominal pain, urinary frequency, fever, chills, or HA. Social Hx: (+) Tobacco: cigarettes 1ppd, (-) EtOH, (-) Illicit Drugs    There are no other complaints, changes, or physical findings at this time.     PCP: None    Current Facility-Administered Medications   Medication Dose Route Frequency Provider Last Rate Last Dose    insulin regular (NOVOLIN R, HUMULIN R) 100 Units in 0.9% sodium chloride 100 mL infusion  1-10 Units/hr IntraVENous TITRATE Marika Clements MD 8.3 mL/hr at 06/08/18 1356 8.3 Units/hr at 06/08/18 1356    0.9% sodium chloride infusion  150 mL/hr IntraVENous CONTINUOUS Edu Lombardi  mL/hr at 06/08/18 1356 150 mL/hr at 06/08/18 1356    sodium chloride (NS) flush 5-10 mL  5-10 mL IntraVENous Q8H Edu Lombardi MD   5 mL at 06/08/18 1400    sodium chloride (NS) flush 5-10 mL  5-10 mL IntraVENous PRN Edu Lombardi MD        acetaminophen (TYLENOL) tablet 650 mg  650 mg Oral Q6H PRN Edu Lombardi MD        ondansetron [facility]) injection 4 mg  4 mg IntraVENous Q6H PRN Steven Dunne MD        enoxaparin (LOVENOX) injection 40 mg  40 mg SubCUTAneous Q24H Steven Dunne MD         Current Outpatient Prescriptions   Medication Sig Dispense Refill    acetaminophen (TYLENOL EXTRA STRENGTH) 500 mg tablet Take 1,000 mg by mouth two (2) times daily as needed ('Pain/Headache').  insulin regular (NOVOLIN R) 100 unit/mL injection 2-10 Units by SubCUTAneous route See Admin Instructions. Take 3-4 times daily per sliding scale; Patient is not sure of exact scale.'      insulin glargine (LANTUS) 100 unit/mL injection 46 Units by SubCUTAneous route daily. Past History     Past Medical History:  Past Medical History:   Diagnosis Date    Bipolar 1 disorder, depressed ([address]     [facility]     Depression     Diabetes ([facility]     DKA, type 1 ([facility] 1/27/2013    diagnosed age 23    H/O noncompliance with medical treatment, presenting hazards to health     MRSA (methicillin resistant staph aureus) culture positive     MRSA (methicillin resistant Staphylococcus aureus)     Face    Noncompliance with medication regimen     Second hand smoke exposure     Seizure ([address]     Seizures ([address] 2006 or 2007    one episode during prison     Past Surgical History:  Past Surgical History:   Procedure Laterality Date    HX HEENT      top left wisdom tooth    HX ORTHOPAEDIC Left     wrist; MCV     Family History:  Family History   Problem Relation Age of Onset    Diabetes Mother     Diabetes Other      neice, type 1      Social History:  Social History   Substance Use Topics    Smoking status: Current Every Day Smoker     Packs/day: 1.00     Years: 16.00     Types: Cigarettes    Smokeless tobacco: Never Used    Alcohol use No     Allergies:  No Known Allergies    Review of Systems   Review of Systems   Constitutional: Positive for fatigue. Negative for chills and fever. HENT: Negative for congestion.     Eyes: Negative for visual disturbance. Respiratory: Positive for shortness of breath. Cardiovascular: Positive for chest pain (heartburn). Gastrointestinal: Positive for nausea and vomiting. Negative for abdominal pain. Endocrine: Positive for polydipsia. Negative for heat intolerance. Genitourinary: Negative. Negative for frequency. Musculoskeletal: Positive for back pain. Skin: Positive for wound. Allergic/Immunologic: Negative for immunocompromised state. Neurological: Positive for light-headedness. Negative for headaches. Hematological: Does not bruise/bleed easily. Psychiatric/Behavioral: Negative. All other systems reviewed and are negative. Physical Exam   Physical Exam   Constitutional: He is oriented to person, place, and time. He appears well-developed and well-nourished. No distress. No acute distress    HENT:   Head: Normocephalic and atraumatic. Eyes: EOM are normal. Pupils are equal, round, and reactive to light. Neck: Normal range of motion. Neck supple. Cardiovascular: Normal rate, regular rhythm and normal heart sounds. Pulmonary/Chest: Effort normal and breath sounds normal. He has no wheezes. Abdominal: Soft. Bowel sounds are normal. There is tenderness in the left lower quadrant. Musculoskeletal: Normal range of motion. He exhibits tenderness. He exhibits no edema. Bilateral lower back tenderness    Neurological: He is alert and oriented to person, place, and time. No cranial nerve deficit. Skin: Skin is warm and dry. Psychiatric: He has a normal mood and affect. His behavior is normal.   Nursing note and vitals reviewed.     Diagnostic Study Results     Labs -     Recent Results (from the past 12 hour(s))   GLUCOSE, POC    Collection Time: 06/08/18 10:52 AM   Result Value Ref Range    Glucose (POC) >600 (HH) 65 - 100 mg/dL    Performed by Mamadou Gallegos, POC    Collection Time: 06/08/18 10:53 AM   Result Value Ref Range    Glucose (POC) >600 (HH) 65 - 100 mg/dL    Performed by Akash Ocampo    CBC WITH AUTOMATED DIFF    Collection Time: 06/08/18 11:02 AM   Result Value Ref Range    WBC 7.0 4.1 - 11.1 K/uL    RBC 4.45 4.10 - 5.70 M/uL    HGB 14.5 12.1 - 17.0 g/dL    HCT 40.7 36.6 - 50.3 %    MCV 91.5 80.0 - 99.0 FL    MCH 32.6 26.0 - 34.0 PG    MCHC 35.6 30.0 - 36.5 g/dL    RDW 12.4 11.5 - 14.5 %    PLATELET 309 564 - 344 K/uL    MPV 10.8 8.9 - 12.9 FL    NRBC 0.0 0  WBC    ABSOLUTE NRBC 0.00 0.00 - 0.01 K/uL    NEUTROPHILS 63 32 - 75 %    LYMPHOCYTES 24 12 - 49 %    MONOCYTES 10 5 - 13 %    EOSINOPHILS 2 0 - 7 %    BASOPHILS 1 0 - 1 %    IMMATURE GRANULOCYTES 0 0.0 - 0.5 %    ABS. NEUTROPHILS 4.5 1.8 - 8.0 K/UL    ABS. LYMPHOCYTES 1.7 0.8 - 3.5 K/UL    ABS. MONOCYTES 0.7 0.0 - 1.0 K/UL    ABS. EOSINOPHILS 0.1 0.0 - 0.4 K/UL    ABS. BASOPHILS 0.0 0.0 - 0.1 K/UL    ABS. IMM. GRANS. 0.0 0.00 - 0.04 K/UL    DF AUTOMATED     METABOLIC PANEL, COMPREHENSIVE    Collection Time: 06/08/18 11:02 AM   Result Value Ref Range    Sodium 126 (L) 136 - 145 mmol/L    Potassium 5.0 3.5 - 5.1 mmol/L    Chloride 86 (L) 97 - 108 mmol/L    CO2 20 (L) 21 - 32 mmol/L    Anion gap 20 (H) 5 - 15 mmol/L    Glucose 693 (HH) 65 - 100 mg/dL    BUN 29 (H) 6 - 20 MG/DL    Creatinine 1.10 0.70 - 1.30 MG/DL    BUN/Creatinine ratio 26 (H) 12 - 20      GFR est AA >60 >60 ml/min/1.73m2    GFR est non-AA >60 >60 ml/min/1.73m2    Calcium 9.8 8.5 - 10.1 MG/DL    Bilirubin, total 0.9 0.2 - 1.0 MG/DL    ALT (SGPT) 59 12 - 78 U/L    AST (SGOT) 39 (H) 15 - 37 U/L    Alk.  phosphatase 163 (H) 45 - 117 U/L    Protein, total 7.3 6.4 - 8.2 g/dL    Albumin 4.0 3.5 - 5.0 g/dL    Globulin 3.3 2.0 - 4.0 g/dL    A-G Ratio 1.2 1.1 - 2.2     URINALYSIS W/ RFLX MICROSCOPIC    Collection Time: 06/08/18 11:02 AM   Result Value Ref Range    Color YELLOW/STRAW      Appearance CLEAR CLEAR      Specific gravity 1.010 1.003 - 1.030      pH (UA) 6.0 5.0 - 8.0      Protein NEGATIVE  NEG mg/dL    Glucose >1000 (A) NEG mg/dL Ketone >80 (A) NEG mg/dL    Bilirubin NEGATIVE  NEG      Blood SMALL (A) NEG      Urobilinogen 0.2 0.2 - 1.0 EU/dL    Nitrites NEGATIVE  NEG      Leukocyte Esterase NEGATIVE  NEG     LIPASE    Collection Time: 06/08/18 11:02 AM   Result Value Ref Range    Lipase 86 73 - 393 U/L   URINE MICROSCOPIC ONLY    Collection Time: 06/08/18 11:02 AM   Result Value Ref Range    WBC 0-4 0 - 4 /hpf    RBC 5-10 0 - 5 /hpf    Epithelial cells FEW FEW /lpf    Bacteria NEGATIVE  NEG /hpf   CK W/ CKMB & INDEX    Collection Time: 06/08/18 11:02 AM   Result Value Ref Range     39 - 308 U/L    CK - MB 6.4 (H) <3.6 NG/ML    CK-MB Index 3.0 (H) 0 - 2.5     TROPONIN I    Collection Time: 06/08/18 11:02 AM   Result Value Ref Range    Troponin-I, Qt. <0.04 <0.05 ng/mL   EKG, 12 LEAD, INITIAL    Collection Time: 06/08/18 11:26 AM   Result Value Ref Range    Ventricular Rate 96 BPM    Atrial Rate 96 BPM    P-R Interval 142 ms    QRS Duration 98 ms    Q-T Interval 362 ms    QTC Calculation (Bezet) 457 ms    Calculated P Axis 64 degrees    Calculated R Axis 78 degrees    Calculated T Axis 57 degrees    Diagnosis       Normal sinus rhythm  Normal ECG  When compared with ECG of 07-MAR-2017 09:02,  QRS axis shifted right  Criteria for Inferior infarct are no longer present  T wave inversion no longer evident in Inferior leads     GLUCOSE, POC    Collection Time: 06/08/18  1:22 PM   Result Value Ref Range    Glucose (POC) 476 (H) 65 - 100 mg/dL    Performed by Alla Zheng    Collection Time: 06/08/18  1:59 PM   Result Value Ref Range    Glucose 476 mg/dL    Insulin order 8.3 units/hour    Insulin adminstered 8.3 units/hour    Multiplier 0.020     Low target 200 mg/dL    High target 300 mg/dL    D50 order 0.0 ml    D50 administered 0.00 ml    Minutes until next BG 60 min    Order initials mat     Administered initials mat     GLSCOM Comments       Radiologic Studies -     CXR Results  (Last 48 hours) 06/08/18 1143  XR ABD ACUTE W 1 V CHEST Final result    Impression:  IMPRESSION: No acute abnormality. Narrative:  EXAM:  XR ABD ACUTE W 1 V CHEST       INDICATION:  vomiting       COMPARISON: 3/7/2017. FINDINGS: The upright chest radiograph demonstrates clear lungs and normal   cardiac and mediastinal contours. There is no pleural effusion or free air under   the diaphragm. Supine and upright views of the abdomen demonstrate a nonobstructive bowel gas   pattern. There is no free intraperitoneal air. No soft tissue masses or   pathologic calcifications are identified. The bones are within normal limits. Medical Decision Making   I am the first provider for this patient. I reviewed the vital signs, available nursing notes, past medical history, past surgical history, family history and social history. Vital Signs-Reviewed the patient's vital signs. Patient Vitals for the past 12 hrs:   Temp Pulse Resp BP SpO2   06/08/18 1407 - - - - 96 %   06/08/18 1400 - - - 112/64 100 %   06/08/18 1330 - - - 111/75 100 %   06/08/18 1230 - - - 118/80 100 %   06/08/18 1200 - - - 126/85 100 %   06/08/18 1153 - - - 139/82 100 %   06/08/18 1130 - - - 124/68 99 %   06/08/18 1106 - - - 119/67 -   06/08/18 1049 97.6 °F (36.4 °C) (!) 102 16 133/85 98 %     Pulse Oximetry Analysis - 98% on RA    EKG interpretation: (Preliminary) 11:26  Rhythm: normal sinus rhythm; and regular . Rate (approx.): 96; Axis: normal; WI interval: normal; QRS interval: normal ; ST/T wave: normal; Other findings: Improved. Written by LUIS E Navarrete, as dictated by Gabriela Sosa MD.    Records Reviewed: Nursing Notes and Old Medical Records, Old EKGs    Provider Notes (Medical Decision Making):   DDx: DKA, dehydration, electrolyte abnormality, gastritis, gastroparesis, CAD, reflux     ED Course:   Initial assessment performed.  The patients presenting problems have been discussed, and they are in agreement with the care plan formulated and outlined with them. I have encouraged them to ask questions as they arise throughout their visit. PROGRESS NOTE:  12:27 PM  Pt has been re-evaluated. He reports 1 episode of emesis since arrival to the ED. Pt also notes that he experienced a puncture wound to his left plantar surface distally on 3/9/2018 when he stepped on a nail. His tetanus was updated at the time. The area is tender, but not red. Pt notes pain with pressure to the area. XRAY imaging was not performed at the time. Written by Debbie Taveras ED Scribe, as dictated by Ricardo Frost MD    CONSULT NOTE:  12:26 PM  Ricardo Frost MD spoke with Dr. Og Macario,  Specialty: Hospitalist   Discussed pt's hx, disposition, and available diagnostic and imaging results. Reviewed care plans. Consultant recommends admitting the pt. Written by Debbie Taveras ED Scribe, as dictated by Ricardo Frost MD    Tobacco Counseling  Discussed the risks of smoking and the benefits of smoking cessation as well as the long term sequelae of smoking with the patient. The patient verbalized their understanding. CRITICAL CARE NOTE :    12:00 PM    IMPENDING DETERIORATION -Cardiovascular, Metabolic and Renal    ASSOCIATED RISK FACTORS - Dysrhythmia, Metabolic changes and Dehydration    MANAGEMENT- Bedside Assessment and Supervision of Care    INTERPRETATION -  Xrays, ECG and Blood Pressure    INTERVENTIONS - hemodynamic mngmt and Metobolic interventions    CASE REVIEW - Hospitalist, Nursing and Family    TREATMENT RESPONSE -Improved    PERFORMED BY - Self    NOTES   :    I have spent 50 minutes of critical care time involved in lab review, consultations with specialist, family decision- making, bedside attention and documentation. During this entire length of time I was immediately available to the patient .     Ricardo Frost MD    Disposition:  Admit Note:  12:28 PM  Patient is being admitted to the hospital by Dr. Linda Boss. The results of their tests and reasons for their admission have been discussed with them and/or available family. They convey agreement and understanding for the need to be admitted and for their admission diagnosis. Consultation has been made with the inpatient physician specialist for hospitalization. PLAN: Admit to Hospitalist     Diagnosis     Clinical Impression:   1. Type 1 diabetes mellitus with ketoacidosis without coma ([address]    2. Intractable vomiting with nausea, unspecified vomiting type    3. Ulcer of left foot, unspecified ulcer stage ([address]        Attestations:    Attestation: This note is prepared by Janelle Farley, acting as scribe for MD Marky Garza MD: The scribe's documentation has been prepared under my direction and personally reviewed by me in its entirety. I confirm that the note above accurately reflects all work, treatment, procedures, and medical decision making performed by me.

## 2018-06-08 NOTE — IP AVS SNAPSHOT
[address] [place] [address] 
[phone] Patient: Danika Chris MRN: [id number] A check wesley indicates which time of day the medication should be taken. My Medications START taking these medications Instructions Each Dose to Equal  
 Morning Noon Evening Bedtime  
 trimethoprim-sulfamethoxazole 160-800 mg per tablet Commonly known as:  BACTRIM DS Your last dose was: Your next dose is: Take 1 Tab by mouth two (2) times a day for 5 days. 1 Tab CONTINUE taking these medications Instructions Each Dose to Equal  
 Morning Noon Evening Bedtime LANTUS U-100 INSULIN 100 unit/mL injection Generic drug:  insulin glargine Your last dose was: Your next dose is:    
   
   
 46 Units by SubCUTAneous route daily. 46 Units NovoLIN R Regular U-100 Insuln 100 unit/mL injection Generic drug:  insulin regular Your last dose was: Your next dose is:    
   
   
 2-10 Units by SubCUTAneous route See Admin Instructions. Take 3-4 times daily per sliding scale; Patient is not sure of exact scale.'  
 2-10 Units TYLENOL EXTRA STRENGTH 500 mg tablet Generic drug:  acetaminophen Your last dose was: Your next dose is: Take 1,000 mg by mouth two (2) times daily as needed ('Pain/Headache').  
 1000 mg Where to Get Your Medications Information on where to get these meds will be given to you by the nurse or doctor. ! Ask your nurse or doctor about these medications  
  trimethoprim-sulfamethoxazole 160-800 mg per tablet

## 2018-06-08 NOTE — H&P
Hospitalist Admission Note    NAME: Sivan Barron   :  1980   MRN:  [id number]     Date/Time:  2018 12:53 PM    Patient PCP: None  ______________________________________________________________________   Assessment & Plan:  Present on Admission:   DKA, type 1 ([address]   Tobacco abuse    DKA type 1 DM uncontrolled  --A1c 12 3/18.  --, AG 20, serum bicarb 20. Claims he is taking lantus and novolin (prescribed to his mother). --IVF, insulin drip. Intractable nausea and vomiting  --presumed to DKA. NPO except ice chips.  --acute abd series normal.  UA negative for infection, lipase normal  --check UDS as hx of polysubstance abuse including marijuana, cocaine, heroin, quit 2 years ago    Chronic left plantar foot ulcer from puncture wound  Hx left foot cellulitis with abscess and sepsis 3/18 s/p I&D.    --has deep ulcer in left plantar foot, no purulent drainage or odor, or redness. Xray left foot pending. No fever or leukocytosis    Hx seizure due to hypoglycemia  Hx pancreatitis 2016  Tobacco abuse      Body mass index is 20.58 kg/(m^2). Code: full  DVT prophylaxis: lovenox  Surrogate decision maker:  mother        Subjective:   CHIEF COMPLAINT:  Intractable nausea and vomiting x 3 days    HISTORY OF PRESENT ILLNESS:     Sivan Barron is a 30 y.o.  male with DM type 1, hx DKA, hx seizure (which he says was due to hypoglycemia) who presents with 3 days of nausea and vomiting over 10x/day, unable to keep down any food or liquids. No abdominal pain, constipation, diarrhea. No fever. + chills. Has some coughing and then would vomit. No dysuria. Has been taking insulin (although the insulins are prescribed to his mother since he has no pcp). Says blood sugar was 192 this morning. Hx of puncture wound and then left foot cellulitis with abscess 3/2018. Had I&D by podiatry Dr. Guillermo Arango.   Since then has chronic ulcer in plantar left foot and pain.  Denies any drainage from ulcer. We were asked to admit for work up and evaluation of the above problems. Past Medical History:   Diagnosis Date    [facility]     [facility]     Depression     Diabetes [facility]     [facility], type [facility] 1/27/2013    diagnosed age 23    H/O noncompliance with medical treatment, presenting hazards to health     MRSA (methicillin resistant staph aureus) culture positive     MRSA (methicillin resistant Staphylococcus aureus)     Face    Noncompliance with medication regimen     Second hand smoke exposure     [facility]     Seizures ([facility] 2006 or 2007    one episode during prison      Past Surgical History:   Procedure Laterality Date    HX HEENT      top left wisdom tooth    HX ORTHOPAEDIC Left     wrist; [facility]     Social History   Substance Use Topics    Smoking status: Current Every Day Smoker     Packs/day: 1.00     Years: 16.00     Types: Cigarettes    Smokeless tobacco: Never Used    Alcohol use No    Drug use:  denies  Lives with coworker. Does [employer] work    Family History   Problem Relation Age of Onset    Diabetes Mother     Diabetes Other      neice, type 1      No Known Allergies     Prior to Admission medications    Medication Sig Start Date End Date Taking? Authorizing Provider   acetaminophen (TYLENOL EXTRA STRENGTH) 500 mg tablet Take 1,000 mg by mouth two (2) times daily as needed ('Pain/Headache'). Yes Historical Provider   insulin regular (NOVOLIN R) 100 unit/mL injection 2-10 Units by SubCUTAneous route See Admin Instructions. Take 3-4 times daily per sliding scale; Patient is not sure of exact scale.'   Yes Historical Provider   insulin glargine (LANTUS) 100 unit/mL injection 46 Units by SubCUTAneous route daily. Yes Historical Provider     REVIEW OF SYSTEMS:  POSITIVE= Bold.   Negative = normal text  General:  fever, chills, sweats, generalized weakness, weight loss/gain, loss of appetite  Eyes: blurred vision, eye pain, loss of vision, diplopia  Ear Nose and Throat:  rhinorrhea, pharyngitis  Respiratory:   cough, sputum production, SOB, wheezing, [place], pleuritic pain  Cardiology:  chest pain, palpitations, orthopnea, PND, edema, syncope   Gastrointestinal:  abdominal pain, N/V, dysphagia, diarrhea, constipation, bleeding  Genitourinary:  frequency, urgency, dysuria, hematuria, incontinence  Muskuloskeletal :  arthralgia, myalgia  Hematology:  easy bruising, bleeding, lymphadenopathy  Dermatological:  rash, ulceration, pruritis  Endocrine:  hot flashes or polydipsia  Neurological:  headache, dizziness, confusion, focal weakness, paresthesia, memory loss, gait disturbance  Psychological: anxiety, depression, agitation      Objective:   VITALS:    Visit Vitals    /85    Pulse (!) 102    Temp 97.6 °F (36.4 °C)    Resp 16    Ht 5' 8\" (1.727 m)    Wt 61.4 kg (135 lb 5.8 oz)    SpO2 100%    BMI 20.58 kg/m2     Temp (24hrs), Av.6 °F (36.4 °C), Min:97.6 °F (36.4 °C), Max:97.6 °F (36.4 °C)    Body mass index is 20.58 kg/(m^2). PHYSICAL EXAM:    General:    Thin male, became drowsy during interview after getting IV compazine but arousable to voice, cooperative, no distress, appears stated age. HEENT: Atraumatic, anicteric sclerae, pink conjunctivae     No oral ulcers, mucosa dry, tongue pierced, throat clear. Hearing intact. Neck:  Supple, symmetrical,  thyroid: non tender  Lungs:   Clear to auscultation bilaterally. No Wheezing or Rhonchi. No rales. Chest wall:  Bilateral nipple rings, No tenderness  No Accessory muscle use. Heart:   Regular  rhythm,  Tachycardic, 2/6 holosystolic murmur   No gallop. No edema. Abdomen:   Soft, non-tender. Not distended. Bowel sounds normal. No masses. Umbilical piercing  Extremities: No cyanosis. No clubbing  Skin:     Not pale Not Jaundiced  Deep ulcer in plantar left foot with surrounding callous without discharge, odor or redness.   Abrasion on right lower leg. Psych:  Drowsy Good insight. Not depressed. Not anxious or agitated. Neurologic: EOMs intact. No facial asymmetry. No aphasia or slurred speech. Symmetrical strength, oriented X 3. Peripheral pulse: Bilateral, DP, 2+  Capillary refill:  normal    IMAGING RESULTS:   []       I have personally reviewed the actual   []     CXR  []     CT scan  CXR:  CT :  EKG: SR 96, normal.   ________________________________________________________________________  Care Plan discussed with:    Comments   Patient y    Family  y Girlfriend bedside   RN     Care Manager                    Consultant:      ________________________________________________________________________  Prophylaxis:  GI none   DVT lovenox   ________________________________________________________________________  Recommended Disposition:   Home with Family y   HH/PT/OT/RN ?   SNF/LTC    CATHIE    ________________________________________________________________________  Code Status:  Full Code y   DNR/DNI    ________________________________________________________________________  TOTAL TIME:  50 minutes      Comments    y Reviewed previous records     ______________________________________________________________________  Suhas Chowdhury MD      Procedures: see electronic medical records for all procedures/Xrays and details which were not copied into this note but were reviewed prior to creation of Plan.     LAB DATA REVIEWED:    Recent Results (from the past 24 hour(s))   GLUCOSE, POC    Collection Time: 06/08/18 10:52 AM   Result Value Ref Range    Glucose (POC) >600 (HH) 65 - 100 mg/dL    Performed by Andrez Ser, POC    Collection Time: 06/08/18 10:53 AM   Result Value Ref Range    Glucose (POC) >600 (HH) 65 - 100 mg/dL    Performed by Earnest Rice    CBC WITH AUTOMATED DIFF    Collection Time: 06/08/18 11:02 AM   Result Value Ref Range    WBC 7.0 4.1 - 11.1 K/uL    RBC 4.45 4.10 - 5.70 M/uL    HGB 14.5 12.1 - 17.0 g/dL HCT 40.7 36.6 - 50.3 %    MCV 91.5 80.0 - 99.0 FL    MCH 32.6 26.0 - 34.0 PG    MCHC 35.6 30.0 - 36.5 g/dL    RDW 12.4 11.5 - 14.5 %    PLATELET 766 464 - 118 K/uL    MPV 10.8 8.9 - 12.9 FL    NRBC 0.0 0  WBC    ABSOLUTE NRBC 0.00 0.00 - 0.01 K/uL    NEUTROPHILS 63 32 - 75 %    LYMPHOCYTES 24 12 - 49 %    MONOCYTES 10 5 - 13 %    EOSINOPHILS 2 0 - 7 %    BASOPHILS 1 0 - 1 %    IMMATURE GRANULOCYTES 0 0.0 - 0.5 %    ABS. NEUTROPHILS 4.5 1.8 - 8.0 K/UL    ABS. LYMPHOCYTES 1.7 0.8 - 3.5 K/UL    ABS. MONOCYTES 0.7 0.0 - 1.0 K/UL    ABS. EOSINOPHILS 0.1 0.0 - 0.4 K/UL    ABS. BASOPHILS 0.0 0.0 - 0.1 K/UL    ABS. IMM. GRANS. 0.0 0.00 - 0.04 K/UL    DF AUTOMATED     METABOLIC PANEL, COMPREHENSIVE    Collection Time: 06/08/18 11:02 AM   Result Value Ref Range    Sodium 126 (L) 136 - 145 mmol/L    Potassium 5.0 3.5 - 5.1 mmol/L    Chloride 86 (L) 97 - 108 mmol/L    CO2 20 (L) 21 - 32 mmol/L    Anion gap 20 (H) 5 - 15 mmol/L    Glucose 693 (HH) 65 - 100 mg/dL    BUN 29 (H) 6 - 20 MG/DL    Creatinine 1.10 0.70 - 1.30 MG/DL    BUN/Creatinine ratio 26 (H) 12 - 20      GFR est AA >60 >60 ml/min/1.73m2    GFR est non-AA >60 >60 ml/min/1.73m2    Calcium 9.8 8.5 - 10.1 MG/DL    Bilirubin, total 0.9 0.2 - 1.0 MG/DL    ALT (SGPT) 59 12 - 78 U/L    AST (SGOT) 39 (H) 15 - 37 U/L    Alk.  phosphatase 163 (H) 45 - 117 U/L    Protein, total 7.3 6.4 - 8.2 g/dL    Albumin 4.0 3.5 - 5.0 g/dL    Globulin 3.3 2.0 - 4.0 g/dL    A-G Ratio 1.2 1.1 - 2.2     URINALYSIS W/ RFLX MICROSCOPIC    Collection Time: 06/08/18 11:02 AM   Result Value Ref Range    Color YELLOW/STRAW      Appearance CLEAR CLEAR      Specific gravity 1.010 1.003 - 1.030      pH (UA) 6.0 5.0 - 8.0      Protein NEGATIVE  NEG mg/dL    Glucose >1000 (A) NEG mg/dL    Ketone >80 (A) NEG mg/dL    Bilirubin NEGATIVE  NEG      Blood SMALL (A) NEG      Urobilinogen 0.2 0.2 - 1.0 EU/dL    Nitrites NEGATIVE  NEG      Leukocyte Esterase NEGATIVE  NEG     LIPASE    Collection Time: 06/08/18 11:02 AM   Result Value Ref Range    Lipase 86 73 - 393 U/L   URINE MICROSCOPIC ONLY    Collection Time: 06/08/18 11:02 AM   Result Value Ref Range    WBC 0-4 0 - 4 /hpf    RBC 5-10 0 - 5 /hpf    Epithelial cells FEW FEW /lpf    Bacteria NEGATIVE  NEG /hpf   CK W/ CKMB & INDEX    Collection Time: 06/08/18 11:02 AM   Result Value Ref Range     39 - 308 U/L    CK - MB 6.4 (H) <3.6 NG/ML    CK-MB Index 3.0 (H) 0 - 2.5     TROPONIN I    Collection Time: 06/08/18 11:02 AM   Result Value Ref Range    Troponin-I, Qt. <0.04 <0.05 ng/mL   EKG, 12 LEAD, INITIAL    Collection Time: 06/08/18 11:26 AM   Result Value Ref Range    Ventricular Rate 96 BPM    Atrial Rate 96 BPM    P-R Interval 142 ms    QRS Duration 98 ms    Q-T Interval 362 ms    QTC Calculation (Bezet) 457 ms    Calculated P Axis 64 degrees    Calculated R Axis 78 degrees    Calculated T Axis 57 degrees    Diagnosis       Normal sinus rhythm  Normal ECG  When compared with ECG of 07-MAR-2017 09:02,  QRS axis shifted right  Criteria for Inferior infarct are no longer present  T wave inversion no longer evident in Inferior leads

## 2018-06-08 NOTE — IP AVS SNAPSHOT
[address] [facility] [address] 
[phone] Patient: Danika Chris MRN: [id number] ESO:[id number] About your hospitalization You were admitted on:  June 8, 2018 You last received care in the:  [facility] 2 PROGRESSIVE CARE You were discharged on:  June 11, 2018 Why you were hospitalized Your primary diagnosis was:  Dka, Type 1 (Hcc) Your diagnoses also included: Tobacco Abuse Follow-up Information Follow up With Details Comments Contact Info Osei December III, DO Go on 7/27/2018 11:00AM; New-Patient PCP appointment. Please arrive 20 minutes early and bring your ID, insurance card. Deangelo Paniaguatjfelicealmacaitlin Arnold [address] [facility] [address] [facility]. 
[phone] [facility]  As needed [facility]es To Your Home [email or web address] [phone] Your Scheduled Appointments Friday July 27, 2018 11:00 AM EDT New Patient with Osei December III, DO [facility] [address] [facility]) [address] [facility] [address] [facility].  
[phone] Discharge Orders None A check wesley indicates which time of day the medication should be taken. My Medications START taking these medications Instructions Each Dose to Equal  
 Morning Noon Evening Bedtime  
 trimethoprim-sulfamethoxazole 160-800 mg per tablet Commonly known as:  BACTRIM DS Your last dose was: Your next dose is: Take 1 Tab by mouth two (2) times a day for 5 days. 1 Tab CONTINUE taking these medications Instructions Each Dose to Equal  
 Morning Noon Evening Bedtime LANTUS U-100 INSULIN 100 unit/mL injection Generic drug:  insulin glargine Your last dose was: Your next dose is:    
   
   
 46 Units by SubCUTAneous route daily. 46 Units NovoLIN R Regular U-100 Insuln 100 unit/mL injection Generic drug:  insulin regular Your last dose was: Your next dose is:    
   
   
 2-10 Units by SubCUTAneous route See Admin Instructions. Take 3-4 times daily per sliding scale; Patient is not sure of exact scale.'  
 2-10 Units TYLENOL EXTRA STRENGTH 500 mg tablet Generic drug:  acetaminophen Your last dose was: Your next dose is: Take 1,000 mg by mouth two (2) times daily as needed ('Pain/Headache').  
 1000 mg Where to Get Your Medications Information on where to get these meds will be given to you by the nurse or doctor. ! Ask your nurse or doctor about these medications  
  trimethoprim-sulfamethoxazole 160-800 mg per tablet Discharge Instructions None [facility] Announcement We are excited to announce that we are making your provider's discharge notes available to you in VoyageByMe. You will see these notes when they are completed and signed by the physician that discharged you from your recent hospital stay. If you have any questions or concerns about any information you see in VoyageByMe, please call the Health Information Department where you were seen or reach out to your Primary Care Provider for more information about your plan of care. Introducing [facility]! Dear Jeanne Neely: 
Thank you for requesting a VoyageByMe account. Our records indicate that you already have an active VoyageByMe account. You can access your account anytime at [email or web address] Did you know that you can access your hospital and ER discharge instructions at any time in VoyageByMe? You can also review all of your test results from your hospital stay or ER visit. Additional Information If you have questions, please visit the Frequently Asked Questions section of the [employer] website at [email or web address] Remember, MedAlliancet is NOT to be used for urgent needs. For medical emergencies, dial 911. Now available from your iPhone and Android! Introducing Holland Urrutia As a [employer] patient, I wanted to make you aware of our electronic visit tool called Holland Urrutia. [employer] 24/7 allows you to connect within minutes with a medical provider 24 hours a day, seven days a week via a mobile device or tablet or logging into a secure website from your computer. You can access Holland Urrutia from anywhere in the [place]. A virtual visit might be right for you when you have a simple condition and feel like you just dont want to get out of bed, or cant get away from work for an appointment, when your regular [employer] provider is not available (evenings, weekends or holidays), or when youre out of town and need minor care. Electronic visits cost only $49 and if the [employer] 24/7 provider determines a prescription is needed to treat your condition, one can be electronically transmitted to a nearby pharmacy*. Please take a moment to enroll today if you have not already done so. The enrollment process is free and takes just a few minutes. To enroll, please download the [employer] 24/7 renetta to your tablet or phone, or visit [email or web address] to enroll on your computer. And, as an [address] [place] [address] patient with a [employer] account, the results of your visits will be scanned into your electronic medical record and your primary care provider will be able to view the scanned results. We urge you to continue to see your regular [employer] provider for your ongoing medical care. And while your primary care provider may not be the one available when you seek a Holland Urrutia virtual visit, the peace of mind you get from getting a real diagnosis real time can be priceless. For more information on Holland Jaimecorinnefin, view our Frequently Asked Questions (FAQs) at [email or web address]. Sincerely, 
 
Kirsten Benites MD 
Chief Medical Officer [employer] *:  certain medications cannot be prescribed via Holland Urrutia Unresulted tests-please follow up with your PCP on these results Procedure/Test Authorizing Provider CBC WITH AUTOMATED DIFF Nataly Grider MD  
 CBC WITH AUTOMATED DIFF Nataly Grider MD  
 CBC WITH AUTOMATED DIFF Prasanna Medicus, DO  
 CK W/ CKMB & INDEX Corina Stokes MD  
 DRUG SCREEN, URINE Raisa Juarez MD  
 EKG, 12 LEAD, INITIAL Corina Stokes MD  
 [facility] Historical Provider [facility] Provider [facility] Provider [facility] Provider [facility] Provider [facility] Provider [facility] Provider [facility] Historical Provider [facility] Historical Provider [facility] Historical Provider [facility] Historical Provider [facility] Historical Provider [facility] Historical Provider [facility] Historical Provider [facility] Historical Provider [facility] Historical Provider [facility] Historical Provider [facility] Historical Provider [facility] Historical Provider [facility] Historical Provider [facility] Historical Provider [facility] Historical Provider [facility] Historical Provider [facility] Historical Provider [facility] Historical Provider [facility] Historical Provider [facility] Historical Provider [facility] Historical Provider [facility] Historical Provider [facility] Historical Provider [facility] Historical Provider [facility] Historical Provider [place] [facility] Provider [place] Provider [facility] Historical Provider [facility] Historical Provider [facility] Historical Provider [facility] Historical Provider [facility] Historical Provider [facility] Historical Provider [facility] Historical Provider [facility] Historical Provider Yvonne Villanueva DO  
 MAGNESIUM Doug Flynn MD  
 [address] [place] [address], MD  
 METABOLIC PANEL, BASIC Doug Flynn MD  
 METABOLIC PANEL, [address] [place], MD  
 METABOLIC PANEL, [address] [place], MD  
 METABOLIC PANEL, COMPREHENSIVE Kavin Walker,   
 PHOSPHORUS Doug Flynn MD  
 TROPONIN I Cruz Torrez MD  
 URINALYSIS W/ RFLX MICROSCOPIC Lauree Flocristina, DO  
 URINE MICROSCOPIC ONLY Kavin Walker,   
 XR ABD ACUTE W 1 Denise Menjivar MD  
 XR FOOT LT AP/LAT Cruz Torrez MD  
  
Providers Seen During Your Hospitalization Provider Specialty Primary office phone Cruz Torrez MD Emergency Medicine [phone] Doug Flynn MD Internal Medicine [phone] Alexander Hoffman MD Internal Medicine [phone] Viral Cook MD Internal Medicine [phone] Your Primary Care Physician (PCP) Primary Care Physician Office Phone Office Fax Arnol Burbank B [phone] You are allergic to the following No active allergies Recent Documentation Height Weight BMI Smoking Status 1.727 m 61.4 kg 20.58 kg/m2 Current Every Day Smoker Emergency Contacts Name Discharge Info Relation Home Work Mobile SergioKhadijah DISCHARGE CAREGIVER [3] Mother [14] [phone] Patient Belongings The following personal items are in your possession at time of discharge: 
  Dental Appliances: None  Visual Aid: None          Jewelry:  Body Piercing  Clothing: At bedside, Footwear, Pants, Shirt, Socks, Undergarments Please provide this summary of care documentation to your next provider. Signatures-by signing, you are acknowledging that this After Visit Summary has been reviewed with you and you have received a copy. Patient Signature:  ____________________________________________________________ Date:  ____________________________________________________________  
  
Rexann Ports Provider Signature:  ____________________________________________________________ Date:  ____________________________________________________________

## 2018-06-09 LAB
ADMINISTERED INITIALS, ADMINIT: NORMAL
ANION GAP SERPL CALC-SCNC: 11 MMOL/L (ref 5–15)
ANION GAP SERPL CALC-SCNC: 7 MMOL/L (ref 5–15)
BUN SERPL-MCNC: 18 MG/DL (ref 6–20)
BUN SERPL-MCNC: 18 MG/DL (ref 6–20)
BUN/CREAT SERPL: 23 (ref 12–20)
BUN/CREAT SERPL: 24 (ref 12–20)
CALCIUM SERPL-MCNC: 7.8 MG/DL (ref 8.5–10.1)
CALCIUM SERPL-MCNC: 8.3 MG/DL (ref 8.5–10.1)
CHLORIDE SERPL-SCNC: 103 MMOL/L (ref 97–108)
CHLORIDE SERPL-SCNC: 107 MMOL/L (ref 97–108)
CO2 SERPL-SCNC: 24 MMOL/L (ref 21–32)
CO2 SERPL-SCNC: 28 MMOL/L (ref 21–32)
CREAT SERPL-MCNC: 0.74 MG/DL (ref 0.7–1.3)
CREAT SERPL-MCNC: 0.78 MG/DL (ref 0.7–1.3)
D50 ADMINISTERED, D50ADM: 0 ML
D50 ADMINISTERED, D50ADM: NORMAL ML
D50 ORDER, D50ORD: 0 ML
D50 ORDER, D50ORD: NORMAL ML
GLSCOM COMMENTS: NORMAL
GLUCOSE BLD STRIP.AUTO-MCNC: 108 MG/DL (ref 65–100)
GLUCOSE BLD STRIP.AUTO-MCNC: 126 MG/DL (ref 65–100)
GLUCOSE BLD STRIP.AUTO-MCNC: 128 MG/DL (ref 65–100)
GLUCOSE BLD STRIP.AUTO-MCNC: 132 MG/DL (ref 65–100)
GLUCOSE BLD STRIP.AUTO-MCNC: 133 MG/DL (ref 65–100)
GLUCOSE BLD STRIP.AUTO-MCNC: 153 MG/DL (ref 65–100)
GLUCOSE BLD STRIP.AUTO-MCNC: 169 MG/DL (ref 65–100)
GLUCOSE BLD STRIP.AUTO-MCNC: 176 MG/DL (ref 65–100)
GLUCOSE BLD STRIP.AUTO-MCNC: 180 MG/DL (ref 65–100)
GLUCOSE BLD STRIP.AUTO-MCNC: 181 MG/DL (ref 65–100)
GLUCOSE BLD STRIP.AUTO-MCNC: 186 MG/DL (ref 65–100)
GLUCOSE BLD STRIP.AUTO-MCNC: 208 MG/DL (ref 65–100)
GLUCOSE BLD STRIP.AUTO-MCNC: 213 MG/DL (ref 65–100)
GLUCOSE BLD STRIP.AUTO-MCNC: 214 MG/DL (ref 65–100)
GLUCOSE BLD STRIP.AUTO-MCNC: 250 MG/DL (ref 65–100)
GLUCOSE BLD STRIP.AUTO-MCNC: 252 MG/DL (ref 65–100)
GLUCOSE BLD STRIP.AUTO-MCNC: 255 MG/DL (ref 65–100)
GLUCOSE BLD STRIP.AUTO-MCNC: 273 MG/DL (ref 65–100)
GLUCOSE BLD STRIP.AUTO-MCNC: 289 MG/DL (ref 65–100)
GLUCOSE BLD STRIP.AUTO-MCNC: 289 MG/DL (ref 65–100)
GLUCOSE BLD STRIP.AUTO-MCNC: 302 MG/DL (ref 65–100)
GLUCOSE SERPL-MCNC: 153 MG/DL (ref 65–100)
GLUCOSE SERPL-MCNC: 271 MG/DL (ref 65–100)
GLUCOSE, GLC: 108 MG/DL
GLUCOSE, GLC: 126 MG/DL
GLUCOSE, GLC: 128 MG/DL
GLUCOSE, GLC: 132 MG/DL
GLUCOSE, GLC: 133 MG/DL
GLUCOSE, GLC: 153 MG/DL
GLUCOSE, GLC: 169 MG/DL
GLUCOSE, GLC: 176 MG/DL
GLUCOSE, GLC: 180 MG/DL
GLUCOSE, GLC: 181 MG/DL
GLUCOSE, GLC: 186 MG/DL
GLUCOSE, GLC: 205 MG/DL
GLUCOSE, GLC: 213 MG/DL
GLUCOSE, GLC: 214 MG/DL
GLUCOSE, GLC: 250 MG/DL
GLUCOSE, GLC: 252 MG/DL
GLUCOSE, GLC: 255 MG/DL
GLUCOSE, GLC: 273 MG/DL
GLUCOSE, GLC: 289 MG/DL
GLUCOSE, GLC: 289 MG/DL
GLUCOSE, GLC: 302 MG/DL
GLUCOSE, GLC: NORMAL MG/DL
HIGH TARGET, HITG: 250 MG/DL
HIGH TARGET, HITG: NORMAL MG/DL
INSULIN ADMINSTERED, INSADM: 0 UNITS/HOUR
INSULIN ADMINSTERED, INSADM: 0.1 UNITS/HOUR
INSULIN ADMINSTERED, INSADM: 0.7 UNITS/HOUR
INSULIN ADMINSTERED, INSADM: 0.7 UNITS/HOUR
INSULIN ADMINSTERED, INSADM: 1 UNITS/HOUR
INSULIN ADMINSTERED, INSADM: 1.4 UNITS/HOUR
INSULIN ADMINSTERED, INSADM: 1.5 UNITS/HOUR
INSULIN ADMINSTERED, INSADM: 1.9 UNITS/HOUR
INSULIN ADMINSTERED, INSADM: 2 UNITS/HOUR
INSULIN ADMINSTERED, INSADM: 2.3 UNITS/HOUR
INSULIN ADMINSTERED, INSADM: 2.3 UNITS/HOUR
INSULIN ADMINSTERED, INSADM: 2.4 UNITS/HOUR
INSULIN ADMINSTERED, INSADM: 3.1 UNITS/HOUR
INSULIN ADMINSTERED, INSADM: 3.3 UNITS/HOUR
INSULIN ADMINSTERED, INSADM: 3.8 UNITS/HOUR
INSULIN ADMINSTERED, INSADM: 4.6 UNITS/HOUR
INSULIN ADMINSTERED, INSADM: 4.8 UNITS/HOUR
INSULIN ADMINSTERED, INSADM: 5.8 UNITS/HOUR
INSULIN ADMINSTERED, INSADM: 6.4 UNITS/HOUR
INSULIN ADMINSTERED, INSADM: NORMAL UNITS/HOUR
INSULIN ORDER, INSORD: 0 UNITS/HOUR
INSULIN ORDER, INSORD: 0.1 UNITS/HOUR
INSULIN ORDER, INSORD: 0.7 UNITS/HOUR
INSULIN ORDER, INSORD: 0.7 UNITS/HOUR
INSULIN ORDER, INSORD: 1 UNITS/HOUR
INSULIN ORDER, INSORD: 1.4 UNITS/HOUR
INSULIN ORDER, INSORD: 1.5 UNITS/HOUR
INSULIN ORDER, INSORD: 1.9 UNITS/HOUR
INSULIN ORDER, INSORD: 2 UNITS/HOUR
INSULIN ORDER, INSORD: 2.3 UNITS/HOUR
INSULIN ORDER, INSORD: 2.3 UNITS/HOUR
INSULIN ORDER, INSORD: 2.4 UNITS/HOUR
INSULIN ORDER, INSORD: 3.1 UNITS/HOUR
INSULIN ORDER, INSORD: 3.3 UNITS/HOUR
INSULIN ORDER, INSORD: 3.8 UNITS/HOUR
INSULIN ORDER, INSORD: 4.6 UNITS/HOUR
INSULIN ORDER, INSORD: 4.8 UNITS/HOUR
INSULIN ORDER, INSORD: 5.8 UNITS/HOUR
INSULIN ORDER, INSORD: 6.4 UNITS/HOUR
INSULIN ORDER, INSORD: NORMAL UNITS/HOUR
LOW TARGET, LOT: 150 MG/DL
LOW TARGET, LOT: NORMAL MG/DL
MINUTES UNTIL NEXT BG, NBG: 60 MIN
MINUTES UNTIL NEXT BG, NBG: NORMAL MIN
MULTIPLIER, MUL: 0
MULTIPLIER, MUL: 0.01
MULTIPLIER, MUL: 0.02
MULTIPLIER, MUL: 0.03
MULTIPLIER, MUL: NORMAL
ORDER INITIALS, ORDINIT: NORMAL
POTASSIUM SERPL-SCNC: 3.3 MMOL/L (ref 3.5–5.1)
POTASSIUM SERPL-SCNC: 3.7 MMOL/L (ref 3.5–5.1)
SERVICE CMNT-IMP: ABNORMAL
SODIUM SERPL-SCNC: 138 MMOL/L (ref 136–145)
SODIUM SERPL-SCNC: 142 MMOL/L (ref 136–145)

## 2018-06-09 PROCEDURE — 74011250636 HC RX REV CODE- 250/636: Performed by: HOSPITALIST

## 2018-06-09 PROCEDURE — 65660000000 HC RM CCU STEPDOWN

## 2018-06-09 PROCEDURE — 99218 HC RM OBSERVATION: CPT

## 2018-06-09 PROCEDURE — 96372 THER/PROPH/DIAG INJ SC/IM: CPT

## 2018-06-09 PROCEDURE — 74011636637 HC RX REV CODE- 636/637: Performed by: EMERGENCY MEDICINE

## 2018-06-09 PROCEDURE — 36415 COLL VENOUS BLD VENIPUNCTURE: CPT | Performed by: HOSPITALIST

## 2018-06-09 PROCEDURE — 80048 BASIC METABOLIC PNL TOTAL CA: CPT | Performed by: HOSPITALIST

## 2018-06-09 PROCEDURE — 74011636637 HC RX REV CODE- 636/637: Performed by: INTERNAL MEDICINE

## 2018-06-09 PROCEDURE — 74011250636 HC RX REV CODE- 250/636: Performed by: INTERNAL MEDICINE

## 2018-06-09 PROCEDURE — 74011000258 HC RX REV CODE- 258: Performed by: INTERNAL MEDICINE

## 2018-06-09 PROCEDURE — 74011000258 HC RX REV CODE- 258: Performed by: EMERGENCY MEDICINE

## 2018-06-09 PROCEDURE — 74011250637 HC RX REV CODE- 250/637: Performed by: INTERNAL MEDICINE

## 2018-06-09 PROCEDURE — 96366 THER/PROPH/DIAG IV INF ADDON: CPT

## 2018-06-09 PROCEDURE — 96376 TX/PRO/DX INJ SAME DRUG ADON: CPT

## 2018-06-09 PROCEDURE — 82962 GLUCOSE BLOOD TEST: CPT

## 2018-06-09 RX ORDER — INSULIN LISPRO 100 [IU]/ML
INJECTION, SOLUTION INTRAVENOUS; SUBCUTANEOUS
Status: DISCONTINUED | OUTPATIENT
Start: 2018-06-09 | End: 2018-06-11

## 2018-06-09 RX ORDER — DEXTROSE 50 % IN WATER (D50W) INTRAVENOUS SYRINGE
12.5-25 AS NEEDED
Status: DISCONTINUED | OUTPATIENT
Start: 2018-06-09 | End: 2018-06-11 | Stop reason: HOSPADM

## 2018-06-09 RX ORDER — IBUPROFEN 200 MG
1 TABLET ORAL DAILY
Status: DISCONTINUED | OUTPATIENT
Start: 2018-06-09 | End: 2018-06-11 | Stop reason: HOSPADM

## 2018-06-09 RX ORDER — MAGNESIUM SULFATE 100 %
4 CRYSTALS MISCELLANEOUS AS NEEDED
Status: DISCONTINUED | OUTPATIENT
Start: 2018-06-09 | End: 2018-06-11 | Stop reason: HOSPADM

## 2018-06-09 RX ORDER — POTASSIUM CHLORIDE 750 MG/1
20 TABLET, FILM COATED, EXTENDED RELEASE ORAL
Status: COMPLETED | OUTPATIENT
Start: 2018-06-09 | End: 2018-06-09

## 2018-06-09 RX ORDER — IBUPROFEN 200 MG
1 TABLET ORAL DAILY
Status: DISCONTINUED | OUTPATIENT
Start: 2018-06-10 | End: 2018-06-09

## 2018-06-09 RX ORDER — ONDANSETRON 2 MG/ML
4 INJECTION INTRAMUSCULAR; INTRAVENOUS EVERY 8 HOURS
Status: COMPLETED | OUTPATIENT
Start: 2018-06-09 | End: 2018-06-10

## 2018-06-09 RX ORDER — INSULIN GLARGINE 100 [IU]/ML
46 INJECTION, SOLUTION SUBCUTANEOUS DAILY
Status: DISCONTINUED | OUTPATIENT
Start: 2018-06-09 | End: 2018-06-11

## 2018-06-09 RX ADMIN — ONDANSETRON 4 MG: 2 INJECTION INTRAMUSCULAR; INTRAVENOUS at 12:56

## 2018-06-09 RX ADMIN — INSULIN GLARGINE 46 UNITS: 100 INJECTION, SOLUTION SUBCUTANEOUS at 11:57

## 2018-06-09 RX ADMIN — Medication 10 ML: at 05:56

## 2018-06-09 RX ADMIN — POTASSIUM CHLORIDE 20 MEQ: 750 TABLET, EXTENDED RELEASE ORAL at 11:57

## 2018-06-09 RX ADMIN — ONDANSETRON 4 MG: 2 INJECTION INTRAMUSCULAR; INTRAVENOUS at 22:51

## 2018-06-09 RX ADMIN — Medication 10 ML: at 13:02

## 2018-06-09 RX ADMIN — Medication 10 ML: at 22:52

## 2018-06-09 RX ADMIN — ENOXAPARIN SODIUM 40 MG: 100 INJECTION SUBCUTANEOUS at 13:02

## 2018-06-09 RX ADMIN — INSULIN LISPRO 7 UNITS: 100 INJECTION, SOLUTION INTRAVENOUS; SUBCUTANEOUS at 11:57

## 2018-06-09 RX ADMIN — DEXTROSE MONOHYDRATE AND SODIUM CHLORIDE 50 ML/HR: 5; .9 INJECTION, SOLUTION INTRAVENOUS at 19:33

## 2018-06-09 RX ADMIN — SODIUM CHLORIDE 2 UNITS/HR: 900 INJECTION, SOLUTION INTRAVENOUS at 11:45

## 2018-06-09 NOTE — PROGRESS NOTES
Hospitalist Progress Note    NAME: Fior Kat   :  1980   MRN:  [id number]       Assessment / Plan:    DKA type 1 DM uncontrolled  --A1c 12 3/18.  -admitted and started on insulin drip  -: Gap closed, started diet, glargine (lantus) started with planned overlap before consdiering discontinuing the IV insulin drip.     Intractable nausea and vomiting in setting of above, hx of polysubstance abuse.  -no evidence of acute pancreatitis on this admission  - start diet and ordered scheduled zofran for 3 doses and then as needed basis. Chronic left plantar foot ulcer from puncture wound  Hx left foot cellulitis with abscess and sepsis 3/18 s/p I&D. Per admitting hospitalist: \"has deep ulcer in left plantar foot, no purulent drainage or odor, or redness. -- No fever or leukocytosis. Xray done and possible trauma/injury? 2018: ordered podiatry consult to assess.     Hx seizure due to hypoglycemia as per admission note    Hypokalemia, replete as needed    Hx pancreatitis 2016    Tobacco abuse history  -nicotine patch (pt can refuse if he wants)        Body mass index is 20.58 kg/(m^2).     Code: full  DVT prophylaxis: lovenox  Surrogate decision maker:  mother      Dispo: trend glucose after starting glargine/ssi/diet, followup with podiatry to assist in [facility] as possible discharge can be as early as  pending clinical course.  -pt states he just got insurance (pt advised that he can request to speak with CM to get set up with [facility] primary care doctor).           Subjective:     Chief Complaint / Reason for Physician Visit  Pt is hungry, he has not tried eating yet    Review of Systems:  Symptom Y/N Comments  Symptom Y/N Comments   Fever/Chills n   Chest Pain     Poor Appetite    Edema n    Cough n   Abdominal Pain n    Sputum    Joint Pain     SOB/JOHNSTON    Pruritis/Rash     Nausea/vomit n   Tolerating PT/OT     Diarrhea    Tolerating Diet     Constipation    Other Could NOT obtain due to:      Objective:     VITALS:   Last 24hrs VS reviewed since prior progress note. Most recent are:  Patient Vitals for the past 24 hrs:   Temp Pulse Resp BP SpO2   06/09/18 0744 97.6 °F (36.4 °C) 85 14 135/74 100 %   06/09/18 0426 97.4 °F (36.3 °C) 87 12 116/64 100 %   06/08/18 2330 97.4 °F (36.3 °C) 80 12 99/50 100 %   06/08/18 2046 98.4 °F (36.9 °C) 81 14 113/64 99 %   06/08/18 1610 98 °F (36.7 °C) 93 16 112/66 99 %   06/08/18 1407 - - - - 96 %   06/08/18 1400 - - - 112/64 100 %   06/08/18 1330 - - - 111/75 100 %   06/08/18 1230 - - - 118/80 100 %   06/08/18 1200 - - - 126/85 100 %   06/08/18 1153 - - - 139/82 100 %   06/08/18 1130 - - - 124/68 99 %   06/08/18 1106 - - - 119/67 -       Intake/Output Summary (Last 24 hours) at 06/09/18 1104  Last data filed at 06/08/18 1900   Gross per 24 hour   Intake           1027.5 ml   Output              650 ml   Net            377.5 ml        PHYSICAL EXAM:  General: Alert, cooperative, no acute distress    EENT:  EOMI. Anicteric sclerae. MMM  Resp:  CTA bilaterally, no wheezing or rales. No accessory muscle use  CV:  Regular  rhythm,  No edema  GI:  Soft, Non distended, Non tender.  +Bowel sounds  Neurologic:  Alert and oriented X 3, normal speech,   Psych:   Not anxious nor agitated  Skin:  No rashes. No jaundice      ________________________________________________________________________  Care Plan discussed with:    Comments   Patient x    Family      RN x    Care Manager     Consultant                        Multidiciplinary team rounds were held today with , nursing, pharmacist and clinical coordinator. Patient's plan of care was discussed; medications were reviewed and discharge planning was addressed.      ________________________________________________________________________  Total NON critical care TIME:  40   Minutes    Total CRITICAL CARE TIME Spent:   Minutes non procedure based      Comments   >50% of visit spent in counseling and coordination of care     ________________________________________________________________________  Ludy Ramsey MD     LABS:  I reviewed today's most current labs and imaging studies.   Pertinent labs include:  Recent Labs      06/08/18   1102   WBC  7.0   HGB  14.5   HCT  40.7   PLT  296     Recent Labs      06/09/18   0450  06/09/18   0114  06/08/18   1812  06/08/18   1102   NA  142  138  141  126*   K  3.3*  3.7  3.6  5.0   CL  107  103  104  86*   CO2  28  24  27  20*   GLU  153*  271*  121*  693*   BUN  18  18  21*  29*   CREA  0.74  0.78  0.82  1.10   CA  8.3*  7.8*  8.7  9.8   MG   --    --   2.1   --    PHOS   --    --   2.3*   --    ALB   --    --    --   4.0   TBILI   --    --    --   0.9   SGOT   --    --    --   39*   ALT   --    --    --   59       Signed: Ludy Ramsey MD

## 2018-06-09 NOTE — PROGRESS NOTES
Reason for Admission:   DKA type 1                   RRAT Score:                     Plan for utilizing home health:      No                    Likelihood of Readmission:  Low                         Transition of Care Plan:             Home with PCP     Care Management Interventions  PCP Verified by CM: Yes (New PCP with Dr. Lior Shaikh)  Mode of Transport at Discharge: Self  Discharge Durable Medical Equipment: No (pt owns gluco meter)  Health Maintenance Reviewed: Yes  Current Support Network: Lives with Caregiver (pt lives with his fiance single story home has ramp attached)  Confirm Follow Up Transport: Family  Plan discussed with Pt/Family/Caregiver: Yes  Discharge Location  Discharge Placement: Home     PCP list given. Pt selected Dr. Grace DELAROSA from list. Pt uses 46 units of insuline daily. Uses [facility] pharmacy for prescription. Pt has full time job his fiance will transport pt upon discharge. 5.13 PM  New PCP appointment scheduled for  Friday, July 27, 2018 11:00 AM and also provided dispatch health  informations.     Blanca Park MSW  ED Case Manager   Ext [phone]

## 2018-06-09 NOTE — PROGRESS NOTES
PCU SHIFT NURSING NOTE      Bedside and Verbal shift change report given to Chuck Dupree RN (oncoming nurse) by Norah Akins RN (offgoing nurse). Report included the following information SBAR, Kardex, Intake/Output, Recent Results and Cardiac Rhythm NSR. Shift Summary:   0730: Pt asleep in bed, arousable to voice for assessment. 0830: . Insulin restarted to 0.1ml/hr. BG stable, anion gap closed. Paged MD for questioning stopping serial BMP and Q1 accucheck/ insulin drip. 0930: No response, Paged MD again  0935: , continue insulin gtt at 0.1ml/hr  1035: , continue insulin gtt at 0.1 ml/hr  1135: , per glucostabilizer increase insulin gtt to 2ml/hr  1210: podiatry not on-call on weekend. Orthopedic surgeon consulted. Spoke to Natalia Jacobson NP who states she and Dr. Deisy Cuevas will see pt today or tomorrow. 1510: BG still increased with lantus, sq humalog, and insulin gtt. Per Dr. Joellen Em, continue insulin gtt and hold sliding scale.

## 2018-06-09 NOTE — PROGRESS NOTES
PCU SHIFT NURSING NOTE      Bedside and Verbal shift change report given to Naeem Cruz RN (oncoming nurse) by Steph Epps RN (offgoing nurse). Report included the following information SBAR, Kardex, Intake/Output, Recent Results and Cardiac Rhythm NSR. Shift Summary:   0730: Pt asleep in bed, arousable to voice for assessment. 0830: .  Insulin restarted to 0.1ml/hr

## 2018-06-10 LAB
ADMINISTERED INITIALS, ADMINIT: NORMAL
ANION GAP SERPL CALC-SCNC: 6 MMOL/L (ref 5–15)
BASOPHILS # BLD: 0 K/UL (ref 0–0.1)
BASOPHILS NFR BLD: 0 % (ref 0–1)
BUN SERPL-MCNC: 14 MG/DL (ref 6–20)
BUN/CREAT SERPL: 23 (ref 12–20)
CALCIUM SERPL-MCNC: 8.2 MG/DL (ref 8.5–10.1)
CHLORIDE SERPL-SCNC: 106 MMOL/L (ref 97–108)
CO2 SERPL-SCNC: 28 MMOL/L (ref 21–32)
CREAT SERPL-MCNC: 0.61 MG/DL (ref 0.7–1.3)
D50 ADMINISTERED, D50ADM: 0 ML
D50 ADMINISTERED, D50ADM: 10 ML
D50 ADMINISTERED, D50ADM: 11 ML
D50 ADMINISTERED, D50ADM: 12 ML
D50 ADMINISTERED, D50ADM: 9 ML
D50 ORDER, D50ORD: 0 ML
D50 ORDER, D50ORD: 10 ML
D50 ORDER, D50ORD: 11 ML
D50 ORDER, D50ORD: 12 ML
D50 ORDER, D50ORD: 9 ML
DIFFERENTIAL METHOD BLD: ABNORMAL
EOSINOPHIL # BLD: 0.2 K/UL (ref 0–0.4)
EOSINOPHIL NFR BLD: 3 % (ref 0–7)
ERYTHROCYTE [DISTWIDTH] IN BLOOD BY AUTOMATED COUNT: 12.6 % (ref 11.5–14.5)
GLSCOM COMMENTS: NORMAL
GLUCOSE BLD STRIP.AUTO-MCNC: 102 MG/DL (ref 65–100)
GLUCOSE BLD STRIP.AUTO-MCNC: 107 MG/DL (ref 65–100)
GLUCOSE BLD STRIP.AUTO-MCNC: 116 MG/DL (ref 65–100)
GLUCOSE BLD STRIP.AUTO-MCNC: 129 MG/DL (ref 65–100)
GLUCOSE BLD STRIP.AUTO-MCNC: 139 MG/DL (ref 65–100)
GLUCOSE BLD STRIP.AUTO-MCNC: 171 MG/DL (ref 65–100)
GLUCOSE BLD STRIP.AUTO-MCNC: 225 MG/DL (ref 65–100)
GLUCOSE BLD STRIP.AUTO-MCNC: 65 MG/DL (ref 65–100)
GLUCOSE BLD STRIP.AUTO-MCNC: 71 MG/DL (ref 65–100)
GLUCOSE BLD STRIP.AUTO-MCNC: 72 MG/DL (ref 65–100)
GLUCOSE BLD STRIP.AUTO-MCNC: 74 MG/DL (ref 65–100)
GLUCOSE BLD STRIP.AUTO-MCNC: 77 MG/DL (ref 65–100)
GLUCOSE BLD STRIP.AUTO-MCNC: 78 MG/DL (ref 65–100)
GLUCOSE BLD STRIP.AUTO-MCNC: 83 MG/DL (ref 65–100)
GLUCOSE BLD STRIP.AUTO-MCNC: 86 MG/DL (ref 65–100)
GLUCOSE BLD STRIP.AUTO-MCNC: 95 MG/DL (ref 65–100)
GLUCOSE SERPL-MCNC: 85 MG/DL (ref 65–100)
GLUCOSE, GLC: 102 MG/DL
GLUCOSE, GLC: 102 MG/DL
GLUCOSE, GLC: 103 MG/DL
GLUCOSE, GLC: 107 MG/DL
GLUCOSE, GLC: 115 MG/DL
GLUCOSE, GLC: 139 MG/DL
GLUCOSE, GLC: 71 MG/DL
GLUCOSE, GLC: 72 MG/DL
GLUCOSE, GLC: 74 MG/DL
GLUCOSE, GLC: 78 MG/DL
GLUCOSE, GLC: 83 MG/DL
GLUCOSE, GLC: 86 MG/DL
GLUCOSE, GLC: 95 MG/DL
HCT VFR BLD AUTO: 35.1 % (ref 36.6–50.3)
HGB BLD-MCNC: 12.4 G/DL (ref 12.1–17)
HIGH TARGET, HITG: 250 MG/DL
IMM GRANULOCYTES # BLD: 0 K/UL (ref 0–0.04)
IMM GRANULOCYTES NFR BLD AUTO: 0 % (ref 0–0.5)
INSULIN ADMINSTERED, INSADM: 0 UNITS/HOUR
INSULIN ORDER, INSORD: 0 UNITS/HOUR
LOW TARGET, LOT: 150 MG/DL
LYMPHOCYTES # BLD: 2.2 K/UL (ref 0.8–3.5)
LYMPHOCYTES NFR BLD: 38 % (ref 12–49)
MAGNESIUM SERPL-MCNC: 1.9 MG/DL (ref 1.6–2.4)
MCH RBC QN AUTO: 32.5 PG (ref 26–34)
MCHC RBC AUTO-ENTMCNC: 35.3 G/DL (ref 30–36.5)
MCV RBC AUTO: 91.9 FL (ref 80–99)
MINUTES UNTIL NEXT BG, NBG: 15 MIN
MINUTES UNTIL NEXT BG, NBG: 60 MIN
MONOCYTES # BLD: 0.6 K/UL (ref 0–1)
MONOCYTES NFR BLD: 9 % (ref 5–13)
MULTIPLIER, MUL: 0
NEUTS SEG # BLD: 2.9 K/UL (ref 1.8–8)
NEUTS SEG NFR BLD: 50 % (ref 32–75)
NRBC # BLD: 0 K/UL (ref 0–0.01)
NRBC BLD-RTO: 0 PER 100 WBC
ORDER INITIALS, ORDINIT: NORMAL
PLATELET # BLD AUTO: 225 K/UL (ref 150–400)
PMV BLD AUTO: 10.4 FL (ref 8.9–12.9)
POTASSIUM SERPL-SCNC: 3.4 MMOL/L (ref 3.5–5.1)
RBC # BLD AUTO: 3.82 M/UL (ref 4.1–5.7)
SERVICE CMNT-IMP: ABNORMAL
SERVICE CMNT-IMP: NORMAL
SODIUM SERPL-SCNC: 140 MMOL/L (ref 136–145)
WBC # BLD AUTO: 5.9 K/UL (ref 4.1–11.1)

## 2018-06-10 PROCEDURE — 74011250636 HC RX REV CODE- 250/636: Performed by: HOSPITALIST

## 2018-06-10 PROCEDURE — 74011250636 HC RX REV CODE- 250/636: Performed by: INTERNAL MEDICINE

## 2018-06-10 PROCEDURE — 96376 TX/PRO/DX INJ SAME DRUG ADON: CPT

## 2018-06-10 PROCEDURE — 74011000250 HC RX REV CODE- 250: Performed by: INTERNAL MEDICINE

## 2018-06-10 PROCEDURE — 96375 TX/PRO/DX INJ NEW DRUG ADDON: CPT

## 2018-06-10 PROCEDURE — 36415 COLL VENOUS BLD VENIPUNCTURE: CPT | Performed by: INTERNAL MEDICINE

## 2018-06-10 PROCEDURE — 74011636637 HC RX REV CODE- 636/637: Performed by: INTERNAL MEDICINE

## 2018-06-10 PROCEDURE — 74011250637 HC RX REV CODE- 250/637: Performed by: INTERNAL MEDICINE

## 2018-06-10 PROCEDURE — 74011000258 HC RX REV CODE- 258: Performed by: INTERNAL MEDICINE

## 2018-06-10 PROCEDURE — 85025 COMPLETE CBC W/AUTO DIFF WBC: CPT | Performed by: INTERNAL MEDICINE

## 2018-06-10 PROCEDURE — 74011250637 HC RX REV CODE- 250/637: Performed by: HOSPITALIST

## 2018-06-10 PROCEDURE — 96372 THER/PROPH/DIAG INJ SC/IM: CPT

## 2018-06-10 PROCEDURE — 83735 ASSAY OF MAGNESIUM: CPT | Performed by: INTERNAL MEDICINE

## 2018-06-10 PROCEDURE — 65660000000 HC RM CCU STEPDOWN

## 2018-06-10 PROCEDURE — 80048 BASIC METABOLIC PNL TOTAL CA: CPT | Performed by: INTERNAL MEDICINE

## 2018-06-10 PROCEDURE — 99218 HC RM OBSERVATION: CPT

## 2018-06-10 PROCEDURE — 82962 GLUCOSE BLOOD TEST: CPT

## 2018-06-10 RX ORDER — NALOXONE HYDROCHLORIDE 0.4 MG/ML
0.4 INJECTION, SOLUTION INTRAMUSCULAR; INTRAVENOUS; SUBCUTANEOUS AS NEEDED
Status: DISCONTINUED | OUTPATIENT
Start: 2018-06-10 | End: 2018-06-11 | Stop reason: HOSPADM

## 2018-06-10 RX ORDER — OXYCODONE HYDROCHLORIDE 5 MG/1
5 TABLET ORAL
Status: DISCONTINUED | OUTPATIENT
Start: 2018-06-10 | End: 2018-06-11 | Stop reason: HOSPADM

## 2018-06-10 RX ORDER — POTASSIUM CHLORIDE 20 MEQ/1
20 TABLET, EXTENDED RELEASE ORAL ONCE
Status: COMPLETED | OUTPATIENT
Start: 2018-06-10 | End: 2018-06-10

## 2018-06-10 RX ORDER — HYDRALAZINE HYDROCHLORIDE 25 MG/1
25 TABLET, FILM COATED ORAL
Status: DISCONTINUED | OUTPATIENT
Start: 2018-06-10 | End: 2018-06-11 | Stop reason: HOSPADM

## 2018-06-10 RX ORDER — SODIUM CHLORIDE 9 MG/ML
25 INJECTION, SOLUTION INTRAVENOUS CONTINUOUS
Status: DISCONTINUED | OUTPATIENT
Start: 2018-06-10 | End: 2018-06-11 | Stop reason: HOSPADM

## 2018-06-10 RX ADMIN — Medication 10 ML: at 21:09

## 2018-06-10 RX ADMIN — ACETAMINOPHEN 650 MG: 325 TABLET ORAL at 21:08

## 2018-06-10 RX ADMIN — DEXTROSE MONOHYDRATE AND SODIUM CHLORIDE 50 ML/HR: 5; .9 INJECTION, SOLUTION INTRAVENOUS at 16:35

## 2018-06-10 RX ADMIN — DEXTROSE MONOHYDRATE 5 G: 25 INJECTION, SOLUTION INTRAVENOUS at 09:00

## 2018-06-10 RX ADMIN — SODIUM CHLORIDE 25 ML/HR: 900 INJECTION, SOLUTION INTRAVENOUS at 17:47

## 2018-06-10 RX ADMIN — INSULIN LISPRO 4 UNITS: 100 INJECTION, SOLUTION INTRAVENOUS; SUBCUTANEOUS at 17:28

## 2018-06-10 RX ADMIN — Medication 10 ML: at 15:04

## 2018-06-10 RX ADMIN — DEXTROSE MONOHYDRATE 6 G: 25 INJECTION, SOLUTION INTRAVENOUS at 06:23

## 2018-06-10 RX ADMIN — ENOXAPARIN SODIUM 40 MG: 100 INJECTION SUBCUTANEOUS at 15:04

## 2018-06-10 RX ADMIN — DEXTROSE MONOHYDRATE 5.5 G: 25 INJECTION, SOLUTION INTRAVENOUS at 01:47

## 2018-06-10 RX ADMIN — HYDRALAZINE HYDROCHLORIDE 25 MG: 25 TABLET, FILM COATED ORAL at 23:28

## 2018-06-10 RX ADMIN — GABAPENTIN 400 MG: 300 CAPSULE ORAL at 21:08

## 2018-06-10 RX ADMIN — Medication 10 ML: at 06:23

## 2018-06-10 RX ADMIN — DEXTROSE MONOHYDRATE 4.5 G: 25 INJECTION, SOLUTION INTRAVENOUS at 03:03

## 2018-06-10 RX ADMIN — INSULIN GLARGINE 46 UNITS: 100 INJECTION, SOLUTION SUBCUTANEOUS at 10:26

## 2018-06-10 RX ADMIN — POTASSIUM CHLORIDE 20 MEQ: 1500 TABLET, EXTENDED RELEASE ORAL at 12:21

## 2018-06-10 RX ADMIN — OXYCODONE HYDROCHLORIDE 5 MG: 5 TABLET ORAL at 17:40

## 2018-06-10 RX ADMIN — HYDRALAZINE HYDROCHLORIDE 25 MG: 25 TABLET, FILM COATED ORAL at 16:35

## 2018-06-10 RX ADMIN — ACETAMINOPHEN 650 MG: 325 TABLET ORAL at 12:24

## 2018-06-10 RX ADMIN — ONDANSETRON 4 MG: 2 INJECTION INTRAMUSCULAR; INTRAVENOUS at 06:24

## 2018-06-10 RX ADMIN — OXYCODONE HYDROCHLORIDE 5 MG: 5 TABLET ORAL at 23:24

## 2018-06-10 NOTE — PROGRESS NOTES
PCU SHIFT NURSING NOTE      Bedside and Verbal shift change report given to Sawyer Heart RN (oncoming nurse) by Cortes Malloy RN (offgoing nurse). Report included the following information SBAR, Kardex, ED Summary, Intake/Output, MAR, Accordion, Recent Results and Cardiac Rhythm NSR. Shift Summary:   1917: Pt sitting on side of bed. Vitals stable. Family at bedside. Call light & urinal within reach. Pt requested to walk in hallway. Pt ambulated up and down both hallways of PCU with family assisting. Uneventful shift    Bedside and Verbal shift change report given to [address] [place] [address] (oncoming nurse) by Mis Momin (offgoing nurse). Report included the following information SBAR, Kardex, ED Summary, Intake/Output, MAR, Accordion, Recent Results and Cardiac Rhythm NSR. Admission Date 6/8/2018   Admission Diagnosis DKA, type 1 ([facility] [address]   Consults IP CONSULT TO PODIATRY  IP CONSULT TO PODIATRY        Consults   []PT   []OT   []Speech   []Case Management      [] Palliative      Cardiac Monitoring Order   [x]Yes   []No     IV drips   []Yes    Drip:                            Dose:  Drip:                            Dose:  Drip:                            Dose:   [x]No     GI Prophylaxis   []Yes   []No         DVT Prophylaxis   SCDs:             Parminder stockings:         [] Medication   []Contraindicated   []None      Activity Level Activity Level: Bed Rest, Up with Assistance     Activity Assistance: Partial (one person)   Purposeful Rounding every 1-2 hour? [x]Yes   Ness Score  Total Score: 1   Bed Alarm (If score 3 or >)   [x]Yes   [] Refused (See signed refusal form in chart)   Corey Score  Corey Score: 22   Corey Score (if score 14 or less)   []PMT consult   [x]Wound Care consult      []Specialty bed   [] Nutrition consult          Needs prior to discharge:   Home O2 required:    []Yes   [x]No    If yes, how much O2 required?     Other:    Last Bowel Movement: Last Bowel Movement Date: 06/09/18      Influenza Vaccine Received Flu Vaccine for Current Season (usually Sept-March): Not Flu Season        Pneumonia Vaccine           Diet Active Orders   Diet    DIET DIABETIC CONSISTENT CARB Regular      LDAs               Peripheral IV 06/10/18 Left Hand (Active)   Site Assessment Clean, dry, & intact 6/10/2018 11:29 AM   Phlebitis Assessment 0 6/10/2018 11:29 AM   Infiltration Assessment 0 6/10/2018 11:29 AM   Dressing Status Clean, dry, & intact 6/10/2018 11:29 AM   Dressing Type Transparent 6/10/2018 11:29 AM   Hub Color/Line Status Blue; Infusing 6/10/2018 11:29 AM                      Urinary Catheter      Intake & Output   Date 06/09/18 0700 - 06/10/18 0659 06/10/18 0700 - 06/11/18 0659   Shift 6412-8124 6499-0313 24 Hour Total 5387-7014 0704-7337 24 Hour Total   I  N  T  A  K  E   Shift Total  (mL/kg)         O  U  T  P  U  T   Urine  (mL/kg/hr)  700  (1) 700  (0.5) 850  850      Urine Voided  700 700 850  850      Urine Occurrence(s) 1 x  1 x       Shift Total  (mL/kg)  700  (11.4) 700  (11.4) 850  (13.8)  850  (13.8)   NET  -700 -700 -850  -850   Weight (kg) 61.4 61.4 61.4 61.4 61.4 61.4         Readmission Risk Assessment Tool Score Low Risk            4       Total Score        4 IP Visits Last 12 Months (1-3=4, 4=9, >4=11)        Criteria that do not apply:    Has Seen PCP in Last 6 Months (Yes=3, No=0)    . Living with Significant Other. Assisted Living. LTAC. SNF. or   Rehab    Patient Length of Stay (>5 days = 3)    Pt.  Coverage (Medicare=5 , Medicaid, or Self-Pay=4)    Charlson Comorbidity Score (Age + Comorbid Conditions)       Expected Length of Stay 2d 4h   Actual Length of Stay 2

## 2018-06-10 NOTE — PROGRESS NOTES
PCU SHIFT NURSING NOTE      Bedside and Verbal shift change report given to Adriel Funes RN (oncoming nurse) by Remy Archuleta RN (offgoing nurse). Report included the following information SBAR, Kardex, MAR, Recent Results and Cardiac Rhythm NSR. Shift Summary:   0900: Pt BG low. Treated per protocol & called Dr. Blair Louise to make aware. Lab Results   Component Value Date/Time    Glucose 85 06/10/2018 04:15 AM    Glucose (POC) 74 06/10/2018 08:59 AM    Glucose (POC) 65 06/10/2018 08:59 AM     1130: Insulin drip discontinued. Will monitor patient per [facility] order  [id number]: Pt complaining of foot pain. Tylenol given. 1430: Pt still complaining of foot pain 7/10. MD paged. Patient Vitals for the past 4 hrs:   Temp Pulse Resp BP SpO2   06/10/18 1726 - 90 - 144/82 -   06/10/18 1635 - - - (!) 153/94 -   06/10/18 1626 - 79 - (!) 153/94 -   06/10/18 1508 98.1 °F (36.7 °C) 84 16 (!) 156/93 99 %         Pt given hydralazine at 1630 for high BP. BP decreased. 1730: MD paged for foot pain    1750: Pt requested oxycodone for pain. States he has taken gabapentin in the past and it doesn't help his pain. States this pain is not nerve pain but pain related to wound in foot.

## 2018-06-10 NOTE — PROGRESS NOTES
Hospitalist Progress Note    NAME: Aleida Lino   :  1980   MRN:  [id number]       Assessment / Plan:    DKA type 1 DM uncontrolled due to noncompliance  -admitted and started on insulin drip  -: Gap closed, started diet, glargine (lantus) started with planned overlap before consdiering discontinuing the IV insulin drip.  -6/10: nurse reports that glucose dropped overnight in mid 60's-70's and gave amps of dextrose and did not notify overnight hospitalist, insulin drip was stopped.     Intractable nausea and vomiting in setting of above, hx of polysubstance abuse--seems to have resolved with scheduled zofran, now zofran back to as needed dosing.  -no evidence of acute pancreatitis on this admission  - start diet and ordered scheduled zofran for 3 doses and then as needed basis. Chronic left plantar foot ulcer from puncture wound  Hx left foot cellulitis with abscess and sepsis 3/18 s/p I&D. Per admitting hospitalist: \"has deep ulcer in left plantar foot, no purulent drainage or odor, or redness. -- No fever or leukocytosis. Xray done and possible trauma/injury?   2018: ordered podiatry consult to assess.     Hx seizure due to hypoglycemia as per admission note    Hypokalemia, replete as needed    Hx pancreatitis     Tobacco abuse history  -nicotine patch (pt can refuse if he wants)        Body mass index is 20.58 kg/(m^2).     Code: full  DVT prophylaxis: lovenox  Surrogate decision maker:  mother      Dispo: still waiting for Podiatry to see patient, CM has seen patient for new pcp, diabetes consult ordered on 2018          Subjective:     Chief Complaint / Reason for Physician Visit  No complaints    Review of Systems:  Symptom Y/N Comments  Symptom Y/N Comments   Fever/Chills n   Chest Pain     Poor Appetite    Edema n    Cough n   Abdominal Pain n    Sputum    Joint Pain     SOB/JOHNSTON    Pruritis/Rash     Nausea/vomit n   Tolerating PT/OT     Diarrhea    Tolerating Diet Constipation    Other       Could NOT obtain due to:      Objective:     VITALS:   Last 24hrs VS reviewed since prior progress note. Most recent are:  Patient Vitals for the past 24 hrs:   Temp Pulse Resp BP SpO2   06/10/18 0751 97.7 °F (36.5 °C) 83 16 130/84 100 %   06/10/18 0336 97.7 °F (36.5 °C) 83 16 122/82 100 %   06/09/18 2316 98.4 °F (36.9 °C) 78 16 125/81 99 %   06/09/18 2315 98.3 °F (36.8 °C) 76 18 125/81 99 %   06/09/18 1911 98.2 °F (36.8 °C) 82 18 112/66 100 %   06/09/18 1501 97.8 °F (36.6 °C) 80 16 118/72 100 %   06/09/18 1139 97.6 °F (36.4 °C) 80 16 131/84 100 %       Intake/Output Summary (Last 24 hours) at 06/10/18 1048  Last data filed at 06/10/18 1027   Gross per 24 hour   Intake                0 ml   Output             1550 ml   Net            -1550 ml        PHYSICAL EXAM:  General: Alert, cooperative, no acute distress    EENT:  EOMI. Anicteric sclerae. MMM  Resp:  CTA bilaterally, no wheezing or rales. No accessory muscle use  CV:  Regular  rhythm,  No edema  GI:  Soft, Non distended, Non tender.  +Bowel sounds  Neurologic:  Alert and oriented X 3, normal speech,   Psych:   Not anxious nor agitated  Skin:  No rashes. No jaundice      ________________________________________________________________________  Care Plan discussed with:    Comments   Patient x    Family      RN x    Care Manager     Consultant                        Multidiciplinary team rounds were held today with , nursing, pharmacist and clinical coordinator. Patient's plan of care was discussed; medications were reviewed and discharge planning was addressed.      ________________________________________________________________________  Total NON critical care TIME:  30   Minutes    Total CRITICAL CARE TIME Spent:   Minutes non procedure based      Comments   >50% of visit spent in counseling and coordination of care     ________________________________________________________________________  Simón York MD LABS:  I reviewed today's most current labs and imaging studies.   Pertinent labs include:  Recent Labs      06/10/18   0415  06/08/18   1102   WBC  5.9  7.0   HGB  12.4  14.5   HCT  35.1*  40.7   PLT  225  296     Recent Labs      06/10/18   0415  06/09/18   0450  06/09/18   0114  06/08/18   1812  06/08/18   1102   NA  140  142  138  141  126*   K  3.4*  3.3*  3.7  3.6  5.0   CL  106  107  103  104  86*   CO2  28  28  24  27  20*   GLU  85  153*  271*  121*  693*   BUN  14  18  18  21*  29*   CREA  0.61*  0.74  0.78  0.82  1.10   CA  8.2*  8.3*  7.8*  8.7  9.8   MG  1.9   --    --   2.1   --    PHOS   --    --    --   2.3*   --    ALB   --    --    --    --   4.0   TBILI   --    --    --    --   0.9   SGOT   --    --    --    --   39*   ALT   --    --    --    --   59       Signed: Rogelio Hodge MD

## 2018-06-10 NOTE — PROGRESS NOTES
PCU SHIFT NURSING NOTE      Bedside and Verbal shift change report given to Eve Sullivan RN (oncoming nurse) by Mary Jane Duran RN (offgoing nurse). Report included the following information SBAR, Kardex, Intake/Output, Recent Results and Cardiac Rhythm NSR.     Shift Summary:   0730: Pt sitting up at bedside waiting on breakfast

## 2018-06-10 NOTE — PROGRESS NOTES
Notified by floor nurse for request of pain medication as acetaminophen not enough for his foot. Floor nurse reports pt had hydrocodone in the past.  Pt has hx of poorly controlled diabetes AND hx of polysubstance abuse. Ordered scheduled gabapentin for pain to try and avoid use of narcotic pain meds. Ordered short acting oxycodone as needed basis if gabapentin and acetaminophen is not enough.

## 2018-06-10 NOTE — CONSULTS
Ortho was called regarding podiatry consult, pt was seen and treated by podiatry in March 2018 for the same wound. Will defer to podiatry in light of existing relationship. DANIEL Monahan

## 2018-06-11 VITALS
HEIGHT: 68 IN | OXYGEN SATURATION: 95 % | HEART RATE: 93 BPM | SYSTOLIC BLOOD PRESSURE: 145 MMHG | RESPIRATION RATE: 18 BRPM | DIASTOLIC BLOOD PRESSURE: 97 MMHG | TEMPERATURE: 98 F | BODY MASS INDEX: 20.52 KG/M2 | WEIGHT: 135.36 LBS

## 2018-06-11 LAB
ANION GAP SERPL CALC-SCNC: 7 MMOL/L (ref 5–15)
BASOPHILS # BLD: 0 K/UL (ref 0–0.1)
BASOPHILS NFR BLD: 1 % (ref 0–1)
BUN SERPL-MCNC: 23 MG/DL (ref 6–20)
BUN/CREAT SERPL: 41 (ref 12–20)
CALCIUM SERPL-MCNC: 8.6 MG/DL (ref 8.5–10.1)
CHLORIDE SERPL-SCNC: 103 MMOL/L (ref 97–108)
CO2 SERPL-SCNC: 27 MMOL/L (ref 21–32)
CREAT SERPL-MCNC: 0.56 MG/DL (ref 0.7–1.3)
DIFFERENTIAL METHOD BLD: ABNORMAL
EOSINOPHIL # BLD: 0.2 K/UL (ref 0–0.4)
EOSINOPHIL NFR BLD: 3 % (ref 0–7)
ERYTHROCYTE [DISTWIDTH] IN BLOOD BY AUTOMATED COUNT: 12.3 % (ref 11.5–14.5)
GLUCOSE BLD STRIP.AUTO-MCNC: 154 MG/DL (ref 65–100)
GLUCOSE BLD STRIP.AUTO-MCNC: 226 MG/DL (ref 65–100)
GLUCOSE BLD STRIP.AUTO-MCNC: 58 MG/DL (ref 65–100)
GLUCOSE BLD STRIP.AUTO-MCNC: 69 MG/DL (ref 65–100)
GLUCOSE BLD STRIP.AUTO-MCNC: 73 MG/DL (ref 65–100)
GLUCOSE SERPL-MCNC: 124 MG/DL (ref 65–100)
HCT VFR BLD AUTO: 36.1 % (ref 36.6–50.3)
HGB BLD-MCNC: 12.8 G/DL (ref 12.1–17)
IMM GRANULOCYTES # BLD: 0 K/UL (ref 0–0.04)
IMM GRANULOCYTES NFR BLD AUTO: 0 % (ref 0–0.5)
LYMPHOCYTES # BLD: 2.6 K/UL (ref 0.8–3.5)
LYMPHOCYTES NFR BLD: 40 % (ref 12–49)
MCH RBC QN AUTO: 32.3 PG (ref 26–34)
MCHC RBC AUTO-ENTMCNC: 35.5 G/DL (ref 30–36.5)
MCV RBC AUTO: 91.2 FL (ref 80–99)
MONOCYTES # BLD: 0.6 K/UL (ref 0–1)
MONOCYTES NFR BLD: 10 % (ref 5–13)
NEUTS SEG # BLD: 3 K/UL (ref 1.8–8)
NEUTS SEG NFR BLD: 47 % (ref 32–75)
NRBC # BLD: 0 K/UL (ref 0–0.01)
NRBC BLD-RTO: 0 PER 100 WBC
PLATELET # BLD AUTO: 224 K/UL (ref 150–400)
PMV BLD AUTO: 10.8 FL (ref 8.9–12.9)
POTASSIUM SERPL-SCNC: 3.6 MMOL/L (ref 3.5–5.1)
RBC # BLD AUTO: 3.96 M/UL (ref 4.1–5.7)
SERVICE CMNT-IMP: ABNORMAL
SERVICE CMNT-IMP: NORMAL
SERVICE CMNT-IMP: NORMAL
SODIUM SERPL-SCNC: 137 MMOL/L (ref 136–145)
WBC # BLD AUTO: 6.4 K/UL (ref 4.1–11.1)

## 2018-06-11 PROCEDURE — 82962 GLUCOSE BLOOD TEST: CPT

## 2018-06-11 PROCEDURE — 74011636637 HC RX REV CODE- 636/637: Performed by: INTERNAL MEDICINE

## 2018-06-11 PROCEDURE — 74011250637 HC RX REV CODE- 250/637: Performed by: INTERNAL MEDICINE

## 2018-06-11 PROCEDURE — 96372 THER/PROPH/DIAG INJ SC/IM: CPT

## 2018-06-11 PROCEDURE — 85025 COMPLETE CBC W/AUTO DIFF WBC: CPT | Performed by: INTERNAL MEDICINE

## 2018-06-11 PROCEDURE — 80048 BASIC METABOLIC PNL TOTAL CA: CPT | Performed by: INTERNAL MEDICINE

## 2018-06-11 PROCEDURE — 99218 HC RM OBSERVATION: CPT

## 2018-06-11 PROCEDURE — 36415 COLL VENOUS BLD VENIPUNCTURE: CPT | Performed by: INTERNAL MEDICINE

## 2018-06-11 PROCEDURE — 74011250636 HC RX REV CODE- 250/636: Performed by: HOSPITALIST

## 2018-06-11 RX ORDER — INSULIN LISPRO 100 [IU]/ML
INJECTION, SOLUTION INTRAVENOUS; SUBCUTANEOUS
Status: DISCONTINUED | OUTPATIENT
Start: 2018-06-11 | End: 2018-06-11 | Stop reason: HOSPADM

## 2018-06-11 RX ORDER — INSULIN GLARGINE 100 [IU]/ML
46 INJECTION, SOLUTION SUBCUTANEOUS DAILY
Status: DISCONTINUED | OUTPATIENT
Start: 2018-06-12 | End: 2018-06-11 | Stop reason: HOSPADM

## 2018-06-11 RX ORDER — SULFAMETHOXAZOLE AND TRIMETHOPRIM 800; 160 MG/1; MG/1
1 TABLET ORAL 2 TIMES DAILY
Qty: 10 TAB | Refills: 0 | Status: SHIPPED | OUTPATIENT
Start: 2018-06-11 | End: 2018-06-16

## 2018-06-11 RX ORDER — INSULIN GLARGINE 100 [IU]/ML
40 INJECTION, SOLUTION SUBCUTANEOUS DAILY
Status: DISCONTINUED | OUTPATIENT
Start: 2018-06-12 | End: 2018-06-11

## 2018-06-11 RX ADMIN — GABAPENTIN 400 MG: 300 CAPSULE ORAL at 08:07

## 2018-06-11 RX ADMIN — GABAPENTIN 400 MG: 300 CAPSULE ORAL at 15:43

## 2018-06-11 RX ADMIN — ENOXAPARIN SODIUM 40 MG: 100 INJECTION SUBCUTANEOUS at 13:36

## 2018-06-11 RX ADMIN — INSULIN GLARGINE 46 UNITS: 100 INJECTION, SOLUTION SUBCUTANEOUS at 08:09

## 2018-06-11 RX ADMIN — INSULIN LISPRO 4 UNITS: 100 INJECTION, SOLUTION INTRAVENOUS; SUBCUTANEOUS at 12:04

## 2018-06-11 RX ADMIN — OXYCODONE HYDROCHLORIDE 5 MG: 5 TABLET ORAL at 15:43

## 2018-06-11 RX ADMIN — Medication 10 ML: at 06:00

## 2018-06-11 RX ADMIN — OXYCODONE HYDROCHLORIDE 5 MG: 5 TABLET ORAL at 08:07

## 2018-06-11 NOTE — DISCHARGE SUMMARY
Physician Discharge Summary     Patient ID:  Anila Cohn  [id number]  63 y.o.  1980    Admit date: 6/8/2018    Discharge date and time: 6/11/2018    Admission Diagnoses: DKA, type 1 [facility])    Discharge Diagnoses:    Principal Problem:    DKA, type 1 ([address] (4/9/2014)    Active Problems:    Tobacco abuse (8/18/2011)           Hospital Course:   30 y.o.  male with DM type 1, hx DKA, hx seizure (which he says was due to hypoglycemia) who presents with 3 days of nausea and vomiting over 10x/day, unable to keep down any food or liquids. Has been taking insulin (although the insulins are prescribed to his mother since he has no pcp). Hx of puncture wound and then left foot cellulitis with abscess 3/2018. Had I&D by podiatry Dr. Leroy Ayala. Since then has chronic ulcer in plantar left foot and pain. Denies any drainage from ulcer. DKA type 1 DM   uncontrolled due to noncompliance  s/p drip  6/9: Gap closed, started diet, glargine (lantus) started with planned overlap before considering discontinuing the IV insulin drip. 6/10: nurse reports that glucose dropped overnight in mid 60's-70's and gave amps of dextrose and did not notify overnight hospitalist, insulin drip was stopped. - lantus  - SSI      Intractable nausea and vomiting   in setting of DKA hx of polysubstance abuse--seems to have resolved with scheduled zofran, now zofran back to as needed dosing. no evidence of acute pancreatitis on this admission  6/9 start diet and ordered scheduled zofran for 3 doses  - zofran prn     Chronic left plantar foot ulcer from puncture wound  Hx left foot cellulitis with abscess and sepsis 3/18 s/p I&D.    No fever or leukocytosis. Xray done and possible trauma/injury? Wound recommended podiatry consult to assess.     Pt remained stable for discharge home to follow up with Pod in 1-2wks    PCP: Mark Trevizo III, DO       Condition of patient at discharge: stable    Discharge Exam:  General:   WD, WN. Alert, cooperative, no acute distress    EENT:      EOMI. Anicteric sclerae. MMM  Resp:       CTA bilaterally, no wheezing or rales. No accessory muscle use  CV:           Regular  rhythm,  No edema  GI:            Soft, Non distended, Non tender.  +Bowel sounds  Neurologic: Alert and oriented X 3, normal speech,   Psych:       Good insight. Not anxious nor agitated  Skin:          No rashes. No jaundice    Disposition: home    Patient Instructions:   Current Discharge Medication List      START taking these medications    Details   trimethoprim-sulfamethoxazole (BACTRIM DS) 160-800 mg per tablet Take 1 Tab by mouth two (2) times a day for 5 days. Qty: 10 Tab, Refills: 0         CONTINUE these medications which have NOT CHANGED    Details   acetaminophen (TYLENOL EXTRA STRENGTH) 500 mg tablet Take 1,000 mg by mouth two (2) times daily as needed ('Pain/Headache'). insulin regular (NOVOLIN R) 100 unit/mL injection 2-10 Units by SubCUTAneous route See Admin Instructions. Take 3-4 times daily per sliding scale; Patient is not sure of exact scale.'      insulin glargine (LANTUS) 100 unit/mL injection 46 Units by SubCUTAneous route daily.            Activity: Activity as tolerated  Diet: Diabetic Diet    Follow-up with Yoly Mckee III, DO in 1-2weeks  Follow-up tests/labs n/a    Approximate time spent in patient care on day of discharge: Greater than 30mins     Signed:  Myriam Álvarez MD  6/11/2018  4:39 PM

## 2018-06-11 NOTE — WOUND CARE
Wound Care consult: Chart reviewed. Patient admitted on Friday with DKA. Pt.'s A1C is 12.9 with an average Glucose of 324. He has had a \"sore\" on the left plantar surface around the 3rd metatarsal head with a scab / callous and induration that extends approx 3 cm all around the wound. No acute redness noted around the wound but patient has pain with palpation and he cannot bend the middle toes completely down. He walks on the foot and \"has to work\" and he works with heavy protective boots doing asphalt paving. Pt. States that he walks / stands all day and he has drainage on his socks every day after work.    Alicia Moyer, RN, BSN, CWON ([id number])

## 2018-06-11 NOTE — PROGRESS NOTES
Hospitalist Progress Note    NAME: Ricardo Carty   :  1980   MRN:  [id number]       Assessment / Plan:    DKA type 1 DM   uncontrolled due to noncompliance  s/p drip  : Gap closed, started diet, glargine (lantus) started with planned overlap before considering discontinuing the IV insulin drip. 6/10: nurse reports that glucose dropped overnight in mid 60's-70's and gave amps of dextrose and did not notify overnight hospitalist, insulin drip was stopped. - lantus  - SSI     Intractable nausea and vomiting   in setting of DKA hx of polysubstance abuse--seems to have resolved with scheduled zofran, now zofran back to as needed dosing. no evidence of acute pancreatitis on this admission   start diet and ordered scheduled zofran for 3 doses  - zofran prn    Chronic left plantar foot ulcer from puncture wound  Hx left foot cellulitis with abscess and sepsis 3/18 s/p I&D. No fever or leukocytosis. Xray done and possible trauma/injury?   2018: ordered podiatry consult to assess.  - Pod consulted     seizure   due to hypoglycemia as per admission note  - monitor    Hypokalemia,   - replenish as needed    Hx pancreatitis 2016    Tobacco abuse history  -nicotine patch (pt can refuse if he wants)        Body mass index is 20.58 kg/(m^2).     Code: full  DVT prophylaxis: lovenox  Surrogate decision maker:  mother      Dispo: CM has seen patient for new pcp          Subjective:     Chief Complaint / Reason for Physician Visit: left foot wound  No complaints    Awaiting Pod rec about surgery    Review of Systems:  Symptom Y/N Comments  Symptom Y/N Comments   Fever/Chills n   Chest Pain     Poor Appetite    Edema n    Cough n   Abdominal Pain n    Sputum    Joint Pain     SOB/JOHNSTON    Pruritis/Rash     Nausea/vomit n   Tolerating PT/OT     Diarrhea    Tolerating Diet     Constipation    Other       Could NOT obtain due to:      Objective:     VITALS:   Last 24hrs VS reviewed since prior progress note. Most recent are:  Patient Vitals for the past 24 hrs:   Temp Pulse Resp BP SpO2   06/11/18 0746 98.2 °F (36.8 °C) 89 18 146/85 97 %   06/11/18 0248 98.1 °F (36.7 °C) 83 16 126/76 100 %   06/11/18 0015 - - - 166/90 -   06/10/18 2328 98.2 °F (36.8 °C) 78 16 (!) 161/97 100 %   06/10/18 1917 98 °F (36.7 °C) 89 16 122/74 100 %   06/10/18 1726 - 90 - 144/82 -   06/10/18 1635 - - - (!) 153/94 -   06/10/18 1626 - 79 - (!) 153/94 -   06/10/18 1508 98.1 °F (36.7 °C) 84 16 (!) 156/93 99 %   06/10/18 1129 97.6 °F (36.4 °C) 73 16 155/89 100 %       Intake/Output Summary (Last 24 hours) at 06/11/18 1002  Last data filed at 06/11/18 0810   Gross per 24 hour   Intake                0 ml   Output             1875 ml   Net            -1875 ml        PHYSICAL EXAM:  General: Alert, cooperative, no acute distress    EENT:  EOMI. Anicteric sclerae. MMM  Resp:  CTA bilaterally, no wheezing or rales. No accessory muscle use  CV:  Regular  rhythm,  No edema  GI:  Soft, Non distended, Non tender.  +Bowel sounds  Neurologic:  Alert and oriented X 3, normal speech,   Psych:   Not anxious nor agitated  Skin:  No rashes. No jaundice. Left foot wound open, no drainage      ________________________________________________________________________  Care Plan discussed with:    Comments   Patient x    Family      RN x    Care Manager     Consultant                        Multidiciplinary team rounds were held today with , nursing, pharmacist and clinical coordinator. Patient's plan of care was discussed; medications were reviewed and discharge planning was addressed.      ________________________________________________________________________  Total NON critical care TIME:  30   Minutes    Total CRITICAL CARE TIME Spent:   Minutes non procedure based      Comments   >50% of visit spent in counseling and coordination of care     ________________________________________________________________________  Rosie Medina MD LABS:  I reviewed today's most current labs and imaging studies. Pertinent labs include:  Recent Labs      06/11/18   0245  06/10/18   0415  06/08/18   1102   WBC  6.4  5.9  7.0   HGB  12.8  12.4  14.5   HCT  36.1*  35.1*  40.7   PLT  224  225  296     Recent Labs      06/11/18   0245  06/10/18   0415  06/09/18   0450   06/08/18   1812  06/08/18   1102   NA  137  140  142   < >  141  126*   K  3.6  3.4*  3.3*   < >  3.6  5.0   CL  103  106  107   < >  104  86*   CO2  27  28  28   < >  27  20*   GLU  124*  85  153*   < >  121*  693*   BUN  23*  14  18   < >  21*  29*   CREA  0.56*  0.61*  0.74   < >  0.82  1.10   CA  8.6  8.2*  8.3*   < >  8.7  9.8   MG   --   1.9   --    --   2.1   --    PHOS   --    --    --    --   2.3*   --    ALB   --    --    --    --    --   4.0   TBILI   --    --    --    --    --   0.9   SGOT   --    --    --    --    --   39*   ALT   --    --    --    --    --   59    < > = values in this interval not displayed.        Signed: Calvin Henry MD

## 2018-06-11 NOTE — PROGRESS NOTES
[address] June 11, 2018       RE: Ochoa Perkins      To Whom It May Concern,    This is to certify that Ochoa Perkins was admitted to [facility] from 6/8/18 to 6/11/18 and may return to work on 6/12/2018 with no restrictions. Please feel free to contact my office if you have any questions or concerns. Thank you for your assistance in this matter.       Sincerely,  Eduard Paige MD

## 2018-06-11 NOTE — DIABETES MGMT
DTC Consult Note    Recommendations/ Comments: Pt with low BG this am, noted decreased in Lantus from 46 units to 40 units. If appropriate, please consider :  1. Changing correction scale to lispro correction - normal sensitivity   2. Adding meal time insulin, lispro 2 units    Current hospital DM medication: lantus 40 units,  lispro correction as follows:  Give 4 units for blood glucose 200-250;  Give 6 units for blood glucose 251-300;  Give 8 units for blood glucose 301-350;  Give 10 units for blood glucose greater than 350. Consult received for:  [x]             Assessment of home management                [x]      Medication Recommendations                []             Meter/monitoring     [x]             Insulin instruction     []             New diagnosis     [x]             Outpatient education     []             Insulin pump patient     [x]             Insulin infusion     []             DKA/HHS    Chart reviewed and initial evaluation complete on David Rizvi. Patient is a 30 y.o. male with Type 1 DM on 46 units of Lantus in the am and regular insulin sliding scale per pt. Pt shared that he misses taking his Lantus 1-2x/week and misses taking his sliding scale before meals. Pt shared that he \"just isn't taking care of his diabetes like he should\". Pt also shared that depression may play a part in his DM as he doesn't like having DM and \"wants to be able to eat whatever he wants and whenever he wants\". Pt shared that he has no trouble getting his insulin. Pt reported that he feels uncomfortable taking injections in front of other people because they may make judgements about him due to his past hx of drug abuse. BG monitoring at home [facility] when he doesn't feel good\" which may be a few times a month. Pt shared that he eats throughout the day, drinks 1 gallon of sweet tea daily. Pt is working on setting up a PCP and outpt care.      Assessed and instructed patient on the following:   ·  blood sugar goals, complications of diabetes mellitus, illness management - discussed importance of monitoring BG before meals and stressed the necessity of taking insulin as prescribed, discussed that meal intake/food intake is similar to someone who does not have DM and that food is only one aspect of DM management, nutrition - discussed that limiting sugary beverages will help his BG or to at least work to only drink sweet tea at meals as part of meal (for starting goal), discussed establishing care with PCP and following up with BG records/logs for mediation recommendations, discussed possibly seeing a behavioral health counselor to help with processing emotions/emotional barriers related to DM management, and referred to Diabetes Educator    Encouraged the following:   · dietary modifications: Using MyPlate method at meals, limiting sugary beverages, regular blood sugar monitoring: before meals and at bedtime. Provided patient with the following: [x]             Survival skills education materials               [x]             Insulin education materials               [x]             CHO counting education materials (per pt request)               [x]             Outpatient DTC contact number               []             Glucometer               Patient was able to give return demonstration of     []       Glucometer    []       saline injection      with []     without []       assistance needed. []       Nurse to have patient self inject prior to discharge. Discussed with patient and/or family need for follow up appointment for diabetes management after discharge.   Pt to establish care with PCP     A1c:   Lab Results   Component Value Date/Time    Hemoglobin A1c 12.9 (H) 03/12/2018 03:33 AM       Recent Glucose Results: Lab Results   Component Value Date/Time     (H) 06/11/2018 02:45 AM    GLUCPOC 226 (H) 06/11/2018 11:19 AM    GLUCPOC 73 06/11/2018 07:41 AM    GLUCPOC 58 (L) 06/11/2018 07:17 AM        Lab Results   Component Value Date/Time    Creatinine 0.56 (L) 06/11/2018 02:45 AM     Estimated Creatinine Clearance: 159.9 mL/min (based on Cr of 0.56). Active Orders   Diet    DIET DIABETIC CONSISTENT CARB Regular        PO intake: Patient Vitals for the past 72 hrs:   % Diet Eaten   06/08/18 1809 0 %       Will continue to follow as needed. Thank you.     Sohail Longo, [facility] [address]    Office: [phone]

## 2018-09-02 ENCOUNTER — HOSPITAL ENCOUNTER (INPATIENT)
Age: 38
LOS: 3 days | Discharge: HOME | DRG: 638 | End: 2018-09-05
Attending: EMERGENCY MEDICINE | Admitting: INTERNAL MEDICINE
Payer: SELF-PAY

## 2018-09-02 DIAGNOSIS — N17.9 ACUTE RENAL FAILURE, UNSPECIFIED ACUTE RENAL FAILURE TYPE (HCC): ICD-10-CM

## 2018-09-02 DIAGNOSIS — E10.10 DIABETIC KETOACIDOSIS WITHOUT COMA ASSOCIATED WITH TYPE 1 DIABETES MELLITUS (HCC): Primary | ICD-10-CM

## 2018-09-02 PROBLEM — L03.116 CELLULITIS OF LEFT FOOT: Status: RESOLVED | Noted: 2018-03-11 | Resolved: 2018-09-02

## 2018-09-02 LAB
ADMINISTERED INITIALS, ADMINIT: NORMAL
ALBUMIN SERPL-MCNC: 3.9 G/DL (ref 3.5–5)
ALBUMIN/GLOB SERPL: 1 {RATIO} (ref 1.1–2.2)
ALP SERPL-CCNC: 192 U/L (ref 45–117)
ALT SERPL-CCNC: 64 U/L (ref 12–78)
ANION GAP SERPL CALC-SCNC: 19 MMOL/L (ref 5–15)
ANION GAP SERPL CALC-SCNC: 27 MMOL/L (ref 5–15)
ANION GAP SERPL CALC-SCNC: ABNORMAL MMOL/L (ref 5–15)
APPEARANCE UR: CLEAR
ARTERIAL PATENCY WRIST A: ABNORMAL
AST SERPL-CCNC: 22 U/L (ref 15–37)
BACTERIA URNS QL MICRO: NEGATIVE /HPF
BASE DEFICIT BLDA-SCNC: 24.1 MMOL/L
BASOPHILS # BLD: 0.3 K/UL (ref 0–0.1)
BASOPHILS NFR BLD: 1 % (ref 0–1)
BDY SITE: ABNORMAL
BILIRUB SERPL-MCNC: 0.5 MG/DL (ref 0.2–1)
BILIRUB UR QL: NEGATIVE
BREATHS.SPONTANEOUS ON VENT: 25
BUN SERPL-MCNC: 45 MG/DL (ref 6–20)
BUN SERPL-MCNC: 54 MG/DL (ref 6–20)
BUN SERPL-MCNC: 54 MG/DL (ref 6–20)
BUN/CREAT SERPL: 24 (ref 12–20)
BUN/CREAT SERPL: 24 (ref 12–20)
BUN/CREAT SERPL: 28 (ref 12–20)
CALCIUM SERPL-MCNC: 7.1 MG/DL (ref 8.5–10.1)
CALCIUM SERPL-MCNC: 8.8 MG/DL (ref 8.5–10.1)
CALCIUM SERPL-MCNC: 9.5 MG/DL (ref 8.5–10.1)
CHLORIDE SERPL-SCNC: 115 MMOL/L (ref 97–108)
CHLORIDE SERPL-SCNC: 83 MMOL/L (ref 97–108)
CHLORIDE SERPL-SCNC: 99 MMOL/L (ref 97–108)
CO2 SERPL-SCNC: 10 MMOL/L (ref 21–32)
CO2 SERPL-SCNC: 8 MMOL/L (ref 21–32)
CO2 SERPL-SCNC: <5 MMOL/L (ref 21–32)
COLOR UR: ABNORMAL
CREAT SERPL-MCNC: 1.59 MG/DL (ref 0.7–1.3)
CREAT SERPL-MCNC: 2.23 MG/DL (ref 0.7–1.3)
CREAT SERPL-MCNC: 2.25 MG/DL (ref 0.7–1.3)
D50 ADMINISTERED, D50ADM: 0 ML
D50 ORDER, D50ORD: 0 ML
DIFFERENTIAL METHOD BLD: ABNORMAL
EOSINOPHIL # BLD: 0.3 K/UL (ref 0–0.4)
EOSINOPHIL NFR BLD: 1 % (ref 0–7)
EPITH CASTS URNS QL MICRO: ABNORMAL /LPF
ERYTHROCYTE [DISTWIDTH] IN BLOOD BY AUTOMATED COUNT: 13.2 % (ref 11.5–14.5)
EST. AVERAGE GLUCOSE BLD GHB EST-MCNC: 315 MG/DL
GLOBULIN SER CALC-MCNC: 3.8 G/DL (ref 2–4)
GLSCOM COMMENTS: NORMAL
GLUCOSE BLD STRIP.AUTO-MCNC: 274 MG/DL (ref 65–100)
GLUCOSE BLD STRIP.AUTO-MCNC: 399 MG/DL (ref 65–100)
GLUCOSE BLD STRIP.AUTO-MCNC: 542 MG/DL (ref 65–100)
GLUCOSE BLD STRIP.AUTO-MCNC: >600 MG/DL (ref 65–100)
GLUCOSE SERPL-MCNC: 1053 MG/DL (ref 65–100)
GLUCOSE SERPL-MCNC: 366 MG/DL (ref 65–100)
GLUCOSE SERPL-MCNC: 651 MG/DL (ref 65–100)
GLUCOSE UR STRIP.AUTO-MCNC: >1000 MG/DL
GLUCOSE, GLC: 274 MG/DL
GLUCOSE, GLC: 366 MG/DL
GLUCOSE, GLC: 399 MG/DL
GLUCOSE, GLC: 542 MG/DL
GLUCOSE, GLC: 600 MG/DL
GLUCOSE, GLC: 600 MG/DL
GLUCOSE, GLC: 601 MG/DL
HBA1C MFR BLD: 12.6 % (ref 4.2–6.3)
HCO3 BLDA-SCNC: 4 MMOL/L (ref 22–26)
HCT VFR BLD AUTO: 47.4 % (ref 36.6–50.3)
HGB BLD-MCNC: 15.2 G/DL (ref 12.1–17)
HGB UR QL STRIP: NEGATIVE
HIGH TARGET, HITG: 250 MG/DL
HYALINE CASTS URNS QL MICRO: ABNORMAL /LPF (ref 0–5)
IMM GRANULOCYTES # BLD: 0.3 K/UL (ref 0–0.04)
IMM GRANULOCYTES NFR BLD AUTO: 1 % (ref 0–0.5)
INSULIN ADMINSTERED, INSADM: 10.8 UNITS/HOUR
INSULIN ADMINSTERED, INSADM: 16.2 UNITS/HOUR
INSULIN ADMINSTERED, INSADM: 21.4 UNITS/HOUR
INSULIN ADMINSTERED, INSADM: 21.6 UNITS/HOUR
INSULIN ADMINSTERED, INSADM: 27 UNITS/HOUR
INSULIN ADMINSTERED, INSADM: 27.1 UNITS/HOUR
INSULIN ADMINSTERED, INSADM: 27.5 UNITS/HOUR
INSULIN ADMINSTERED, INSADM: 32.4 UNITS/HOUR
INSULIN ADMINSTERED, INSADM: 37.9 UNITS/HOUR
INSULIN ADMINSTERED, INSADM: 38.6 UNITS/HOUR
INSULIN ORDER, INSORD: 10.8 UNITS/HOUR
INSULIN ORDER, INSORD: 16.2 UNITS/HOUR
INSULIN ORDER, INSORD: 21.4 UNITS/HOUR
INSULIN ORDER, INSORD: 21.6 UNITS/HOUR
INSULIN ORDER, INSORD: 27 UNITS/HOUR
INSULIN ORDER, INSORD: 27.1 UNITS/HOUR
INSULIN ORDER, INSORD: 27.5 UNITS/HOUR
INSULIN ORDER, INSORD: 32.4 UNITS/HOUR
INSULIN ORDER, INSORD: 37.9 UNITS/HOUR
INSULIN ORDER, INSORD: 38.6 UNITS/HOUR
KETONES SERPL QL: ABNORMAL
KETONES UR QL STRIP.AUTO: 80 MG/DL
LEUKOCYTE ESTERASE UR QL STRIP.AUTO: NEGATIVE
LOW TARGET, LOT: 150 MG/DL
LYMPHOCYTES # BLD: 3.5 K/UL (ref 0.8–3.5)
LYMPHOCYTES NFR BLD: 14 % (ref 12–49)
MAGNESIUM SERPL-MCNC: 2.9 MG/DL (ref 1.6–2.4)
MCH RBC QN AUTO: 32.4 PG (ref 26–34)
MCHC RBC AUTO-ENTMCNC: 32.1 G/DL (ref 30–36.5)
MCV RBC AUTO: 101.1 FL (ref 80–99)
MINUTES UNTIL NEXT BG, NBG: 60 MIN
MONOCYTES # BLD: 2.2 K/UL (ref 0–1)
MONOCYTES NFR BLD: 9 % (ref 5–13)
MULTIPLIER, MUL: 0.02
MULTIPLIER, MUL: 0.03
MULTIPLIER, MUL: 0.04
MULTIPLIER, MUL: 0.05
MULTIPLIER, MUL: 0.06
MULTIPLIER, MUL: 0.07
MULTIPLIER, MUL: 0.08
MULTIPLIER, MUL: 0.08
MULTIPLIER, MUL: 0.09
MULTIPLIER, MUL: 0.1
NEUTS SEG # BLD: 18.3 K/UL (ref 1.8–8)
NEUTS SEG NFR BLD: 74 % (ref 32–75)
NITRITE UR QL STRIP.AUTO: NEGATIVE
NRBC # BLD: 0.12 K/UL (ref 0–0.01)
NRBC BLD-RTO: 0.5 PER 100 WBC
ORDER INITIALS, ORDINIT: NORMAL
PCO2 BLDA: 16 MMHG (ref 35–45)
PH BLDA: 7.05 [PH] (ref 7.35–7.45)
PH UR STRIP: 5 [PH] (ref 5–8)
PHOSPHATE SERPL-MCNC: 9.4 MG/DL (ref 2.6–4.7)
PLATELET # BLD AUTO: 389 K/UL (ref 150–400)
PMV BLD AUTO: 11.8 FL (ref 8.9–12.9)
PO2 BLDA: 131 MMHG (ref 80–100)
POTASSIUM SERPL-SCNC: 3.2 MMOL/L (ref 3.5–5.1)
POTASSIUM SERPL-SCNC: 4 MMOL/L (ref 3.5–5.1)
POTASSIUM SERPL-SCNC: 6.3 MMOL/L (ref 3.5–5.1)
PROT SERPL-MCNC: 7.7 G/DL (ref 6.4–8.2)
PROT UR STRIP-MCNC: ABNORMAL MG/DL
RBC # BLD AUTO: 4.69 M/UL (ref 4.1–5.7)
RBC #/AREA URNS HPF: ABNORMAL /HPF (ref 0–5)
RBC MORPH BLD: ABNORMAL
SAO2 % BLD: 97 % (ref 92–97)
SAO2% DEVICE SAO2% SENSOR NAME: ABNORMAL
SERVICE CMNT-IMP: ABNORMAL
SODIUM SERPL-SCNC: 122 MMOL/L (ref 136–145)
SODIUM SERPL-SCNC: 134 MMOL/L (ref 136–145)
SODIUM SERPL-SCNC: 144 MMOL/L (ref 136–145)
SP GR UR REFRACTOMETRY: 1.02 (ref 1–1.03)
SPECIMEN SITE: ABNORMAL
UA: UC IF INDICATED,UAUC: ABNORMAL
UROBILINOGEN UR QL STRIP.AUTO: 0.2 EU/DL (ref 0.2–1)
WBC # BLD AUTO: 24.9 K/UL (ref 4.1–11.1)
WBC URNS QL MICRO: ABNORMAL /HPF (ref 0–4)

## 2018-09-02 PROCEDURE — 65610000006 HC RM INTENSIVE CARE

## 2018-09-02 PROCEDURE — 36415 COLL VENOUS BLD VENIPUNCTURE: CPT | Performed by: INTERNAL MEDICINE

## 2018-09-02 PROCEDURE — 74011000258 HC RX REV CODE- 258: Performed by: EMERGENCY MEDICINE

## 2018-09-02 PROCEDURE — 74011250636 HC RX REV CODE- 250/636: Performed by: INTERNAL MEDICINE

## 2018-09-02 PROCEDURE — 99285 EMERGENCY DEPT VISIT HI MDM: CPT

## 2018-09-02 PROCEDURE — 93005 ELECTROCARDIOGRAM TRACING: CPT

## 2018-09-02 PROCEDURE — 80048 BASIC METABOLIC PNL TOTAL CA: CPT | Performed by: INTERNAL MEDICINE

## 2018-09-02 PROCEDURE — 82962 GLUCOSE BLOOD TEST: CPT

## 2018-09-02 PROCEDURE — 74011636637 HC RX REV CODE- 636/637: Performed by: EMERGENCY MEDICINE

## 2018-09-02 PROCEDURE — C9113 INJ PANTOPRAZOLE SODIUM, VIA: HCPCS | Performed by: INTERNAL MEDICINE

## 2018-09-02 PROCEDURE — 74011250637 HC RX REV CODE- 250/637: Performed by: PHYSICIAN ASSISTANT

## 2018-09-02 PROCEDURE — 82009 KETONE BODYS QUAL: CPT | Performed by: PHYSICIAN ASSISTANT

## 2018-09-02 PROCEDURE — 74011250637 HC RX REV CODE- 250/637: Performed by: INTERNAL MEDICINE

## 2018-09-02 PROCEDURE — 36600 WITHDRAWAL OF ARTERIAL BLOOD: CPT | Performed by: PHYSICIAN ASSISTANT

## 2018-09-02 PROCEDURE — 83036 HEMOGLOBIN GLYCOSYLATED A1C: CPT | Performed by: PHYSICIAN ASSISTANT

## 2018-09-02 PROCEDURE — 83735 ASSAY OF MAGNESIUM: CPT | Performed by: EMERGENCY MEDICINE

## 2018-09-02 PROCEDURE — 74011250636 HC RX REV CODE- 250/636: Performed by: PHYSICIAN ASSISTANT

## 2018-09-02 PROCEDURE — 81001 URINALYSIS AUTO W/SCOPE: CPT | Performed by: PHYSICIAN ASSISTANT

## 2018-09-02 PROCEDURE — 82803 BLOOD GASES ANY COMBINATION: CPT | Performed by: PHYSICIAN ASSISTANT

## 2018-09-02 PROCEDURE — 80053 COMPREHEN METABOLIC PANEL: CPT | Performed by: EMERGENCY MEDICINE

## 2018-09-02 PROCEDURE — 96361 HYDRATE IV INFUSION ADD-ON: CPT

## 2018-09-02 PROCEDURE — 96365 THER/PROPH/DIAG IV INF INIT: CPT

## 2018-09-02 PROCEDURE — 85025 COMPLETE CBC W/AUTO DIFF WBC: CPT | Performed by: EMERGENCY MEDICINE

## 2018-09-02 PROCEDURE — 74011000250 HC RX REV CODE- 250: Performed by: INTERNAL MEDICINE

## 2018-09-02 PROCEDURE — 96375 TX/PRO/DX INJ NEW DRUG ADDON: CPT

## 2018-09-02 PROCEDURE — 84100 ASSAY OF PHOSPHORUS: CPT | Performed by: PHYSICIAN ASSISTANT

## 2018-09-02 RX ORDER — ONDANSETRON 2 MG/ML
2 INJECTION INTRAMUSCULAR; INTRAVENOUS
Status: DISCONTINUED | OUTPATIENT
Start: 2018-09-02 | End: 2018-09-04

## 2018-09-02 RX ORDER — SODIUM CHLORIDE 9 MG/ML
150 INJECTION, SOLUTION INTRAVENOUS CONTINUOUS
Status: DISCONTINUED | OUTPATIENT
Start: 2018-09-02 | End: 2018-09-03 | Stop reason: ALTCHOICE

## 2018-09-02 RX ORDER — NICOTINE 7MG/24HR
1 PATCH, TRANSDERMAL 24 HOURS TRANSDERMAL
Status: DISCONTINUED | OUTPATIENT
Start: 2018-09-02 | End: 2018-09-05 | Stop reason: HOSPADM

## 2018-09-02 RX ORDER — INSULIN LISPRO 100 [IU]/ML
INJECTION, SOLUTION INTRAVENOUS; SUBCUTANEOUS
Status: DISCONTINUED | OUTPATIENT
Start: 2018-09-02 | End: 2018-09-03

## 2018-09-02 RX ORDER — INSULIN GLARGINE 100 [IU]/ML
46 INJECTION, SOLUTION SUBCUTANEOUS DAILY
Status: DISCONTINUED | OUTPATIENT
Start: 2018-09-03 | End: 2018-09-03 | Stop reason: ALTCHOICE

## 2018-09-02 RX ORDER — SODIUM CHLORIDE 0.9 % (FLUSH) 0.9 %
5-10 SYRINGE (ML) INJECTION AS NEEDED
Status: DISCONTINUED | OUTPATIENT
Start: 2018-09-02 | End: 2018-09-05 | Stop reason: HOSPADM

## 2018-09-02 RX ORDER — DEXTROSE 50 % IN WATER (D50W) INTRAVENOUS SYRINGE
25-50 AS NEEDED
Status: DISCONTINUED | OUTPATIENT
Start: 2018-09-02 | End: 2018-09-05 | Stop reason: HOSPADM

## 2018-09-02 RX ORDER — MUPIROCIN 20 MG/G
OINTMENT TOPICAL 2 TIMES DAILY
Status: DISCONTINUED | OUTPATIENT
Start: 2018-09-02 | End: 2018-09-05 | Stop reason: HOSPADM

## 2018-09-02 RX ORDER — NALOXONE HYDROCHLORIDE 0.4 MG/ML
0.4 INJECTION, SOLUTION INTRAMUSCULAR; INTRAVENOUS; SUBCUTANEOUS AS NEEDED
Status: DISCONTINUED | OUTPATIENT
Start: 2018-09-02 | End: 2018-09-05 | Stop reason: HOSPADM

## 2018-09-02 RX ORDER — ONDANSETRON 2 MG/ML
4 INJECTION INTRAMUSCULAR; INTRAVENOUS
Status: COMPLETED | OUTPATIENT
Start: 2018-09-02 | End: 2018-09-02

## 2018-09-02 RX ORDER — ACETAMINOPHEN 500 MG
500 TABLET ORAL
Status: DISCONTINUED | OUTPATIENT
Start: 2018-09-02 | End: 2018-09-05 | Stop reason: HOSPADM

## 2018-09-02 RX ORDER — MORPHINE SULFATE 2 MG/ML
4 INJECTION, SOLUTION INTRAMUSCULAR; INTRAVENOUS
Status: COMPLETED | OUTPATIENT
Start: 2018-09-02 | End: 2018-09-02

## 2018-09-02 RX ORDER — HYDROCODONE BITARTRATE AND ACETAMINOPHEN 5; 325 MG/1; MG/1
1 TABLET ORAL
Status: DISCONTINUED | OUTPATIENT
Start: 2018-09-02 | End: 2018-09-05 | Stop reason: HOSPADM

## 2018-09-02 RX ORDER — CALCIUM GLUCONATE 94 MG/ML
1 INJECTION, SOLUTION INTRAVENOUS
Status: COMPLETED | OUTPATIENT
Start: 2018-09-02 | End: 2018-09-02

## 2018-09-02 RX ORDER — MAGNESIUM SULFATE 100 %
4 CRYSTALS MISCELLANEOUS AS NEEDED
Status: DISCONTINUED | OUTPATIENT
Start: 2018-09-02 | End: 2018-09-05 | Stop reason: HOSPADM

## 2018-09-02 RX ORDER — BISACODYL 5 MG
5 TABLET, DELAYED RELEASE (ENTERIC COATED) ORAL DAILY PRN
Status: DISCONTINUED | OUTPATIENT
Start: 2018-09-02 | End: 2018-09-05 | Stop reason: HOSPADM

## 2018-09-02 RX ORDER — SODIUM POLYSTYRENE SULFONATE 15 G/60ML
15 SUSPENSION ORAL; RECTAL
Status: DISCONTINUED | OUTPATIENT
Start: 2018-09-02 | End: 2018-09-02

## 2018-09-02 RX ORDER — SODIUM CHLORIDE 0.9 % (FLUSH) 0.9 %
5-10 SYRINGE (ML) INJECTION EVERY 8 HOURS
Status: DISCONTINUED | OUTPATIENT
Start: 2018-09-02 | End: 2018-09-05 | Stop reason: HOSPADM

## 2018-09-02 RX ADMIN — ONDANSETRON 2 MG: 2 INJECTION, SOLUTION INTRAMUSCULAR; INTRAVENOUS at 19:57

## 2018-09-02 RX ADMIN — SODIUM CHLORIDE 27.5 UNITS/HR: 900 INJECTION, SOLUTION INTRAVENOUS at 22:17

## 2018-09-02 RX ADMIN — Medication 10 ML: at 17:30

## 2018-09-02 RX ADMIN — CALCIUM GLUCONATE 1 G: 98 INJECTION, SOLUTION INTRAVENOUS at 13:30

## 2018-09-02 RX ADMIN — HYDROCODONE BITARTRATE AND ACETAMINOPHEN 1 TABLET: 5; 325 TABLET ORAL at 20:10

## 2018-09-02 RX ADMIN — SODIUM CHLORIDE 37.9 UNITS/HR: 900 INJECTION, SOLUTION INTRAVENOUS at 19:15

## 2018-09-02 RX ADMIN — SODIUM CHLORIDE 1000 ML: 900 INJECTION, SOLUTION INTRAVENOUS at 13:36

## 2018-09-02 RX ADMIN — SODIUM CHLORIDE 21.4 UNITS/HR: 900 INJECTION, SOLUTION INTRAVENOUS at 23:14

## 2018-09-02 RX ADMIN — SODIUM CHLORIDE 150 ML/HR: 900 INJECTION, SOLUTION INTRAVENOUS at 18:55

## 2018-09-02 RX ADMIN — MORPHINE SULFATE 4 MG: 2 INJECTION, SOLUTION INTRAMUSCULAR; INTRAVENOUS at 12:39

## 2018-09-02 RX ADMIN — SODIUM CHLORIDE 16.2 UNITS/HR: 900 INJECTION, SOLUTION INTRAVENOUS at 15:06

## 2018-09-02 RX ADMIN — SODIUM CHLORIDE 10.8 UNITS/HR: 900 INJECTION, SOLUTION INTRAVENOUS at 13:55

## 2018-09-02 RX ADMIN — MUPIROCIN: 20 OINTMENT TOPICAL at 20:00

## 2018-09-02 RX ADMIN — SODIUM CHLORIDE 27.1 UNITS/HR: 900 INJECTION, SOLUTION INTRAVENOUS at 21:20

## 2018-09-02 RX ADMIN — SODIUM CHLORIDE 21.6 UNITS/HR: 900 INJECTION, SOLUTION INTRAVENOUS at 16:10

## 2018-09-02 RX ADMIN — SODIUM CHLORIDE 40 MG: 9 INJECTION, SOLUTION INTRAMUSCULAR; INTRAVENOUS; SUBCUTANEOUS at 21:03

## 2018-09-02 RX ADMIN — ONDANSETRON 4 MG: 2 INJECTION INTRAMUSCULAR; INTRAVENOUS at 12:38

## 2018-09-02 RX ADMIN — SODIUM CHLORIDE 1000 ML: 900 INJECTION, SOLUTION INTRAVENOUS at 12:39

## 2018-09-02 RX ADMIN — Medication 10 ML: at 21:03

## 2018-09-02 NOTE — H&P
History and Physical          Pt Name  Alvin Adrian   Date of Birth 1980   Medical Record Number  [id number]      Age  30 y.o. PCP None   Admit date:  9/2/2018    Room Number  CD38/38  @ [facility]   Date of Service  9/2/2018     Admission Diagnoses:  DKA (diabetic ketoacidoses) [facility])      Certification: We are admitting Alvin Adrian 30 y.o. male with a principle diagnosis of DKA (diabetic ketoacidoses) ([facility] [address]  This patient also suffers from other comorbidities listed below. I have a high level of concern for rapid decline in hemodynamic stability  leading to life threatening complications      Assessment and plan:  DKA with   Leukocytosis possibly due to stress   -Admit to ICU   -IVF generous   -Insulin drip per glucomander protocol   -careful monitoring of chemistries closely   -resume Lantus tomorrow. -NPO with sips of clears for now. Hyperkalemia  -pt does have peaked t waves, but given the degree of his acidosis and hyperglycemia, we anticipate that the pt will develop normal K level soon. - he has received Ca Gluconate while in the ER. Reported hematemesis.   -this is from unclear reason. -we will place him on IV protonix BID   -if he develops further bleeding, we will ask GI to see him. Diabetes mellitus Type 1 -uncontrolled   -Pt's fiance reports that he receives his insulin from his mother; since she is taking similar insulin. -pt does not have insurance and he is unable to procure any medications.   -this is not a case of intentional non-compliance but rather financial limitation leading to poor compliance. Tobacco abuse disorder -  -Nicotine Patch PRN   -pt smokes about one PPD     ? seizure disorder. The pt's fiance didn't mention anything about seizure and pt does not provide any recent event information.         CODE STATUS  Full         Functional Status  Pt works for department of [employer] on  [email or web address]      Surrogate decision maker: Pt's mother and fiance    Prophylaxis  Hep SQ   Discharge Plan: Home w/Family,     There are currently no Active Isolations Payor: SELF PAY / Plan: [facility] SELF PAY / Product Type: Self Pay /    Social issues  Date Comment    09/02/18   4:35 PM  Met with pt's fiance in the ER and we talked about above plans of care in simple language and answered all questions       Prognosis  Fair      Subjective Data         History of Present Illness :    History obtained from the pt's Fiance. The pt was 'fine' until yesterday evening when the pt started complaining of nausea. That was followed by heaving and vomiting eaten food and some fresh blood. His fiance could not quantitate the amount of blood in the emesis and pt is not able to provide much information either. Pt continued to feel unwell and this morning his family asked for help from EMS. ER workup shows DKA with moderate hyperkalemia, possibly stress leukocytosis.    We were asked to admit him      Review of Systems - History obtained from Fiance  and unobtainable from patient due to mental status  General ROS: positive for  -    negative for - chills, fatigue or fever  Psychological ROS: negative  Ophthalmic ROS: negative  Respiratory ROS: no cough, shortness of breath, or wheezing  Cardiovascular ROS: no chest pain or dyspnea on exertion  Gastrointestinal ROS: positive for - hematemesis and nausea/vomiting  negative for - blood in stools, melena or stool incontinence  ROS       Past Medical History:   Diagnosis Date    Bipolar 1 disorder, depressed ([facility] [address]     Bipolar disorder ([facility] [address]     Depression     Diabetes ([facility] [address]     DKA, type 1 ([facility] [address] 1/27/2013    diagnosed age 23    H/O noncompliance with medical treatment, presenting hazards to health     MRSA (methicillin resistant staph aureus) culture positive     MRSA (methicillin resistant Staphylococcus aureus)     Face    Noncompliance with medication regimen     Second hand smoke exposure     Seizure ([facility] [address]     Seizures ([facility] [address] 2006 or 2007    one episode during nursing home         Past Surgical History:   Procedure Laterality Date    HX HEENT      top left wisdom tooth    HX ORTHOPAEDIC Left     wrist; [facility]         Social History   Substance Use Topics    Smoking status: Current Every Day Smoker     Packs/day: 1.00     Years: 16.00     Types: Cigarettes    Smokeless tobacco: Never Used    Alcohol use No         Family History   Problem Relation Age of Onset    Diabetes Mother     Diabetes Other      neice, type 1            Objective data     Comment: Pt is lying on his left lateral position. His fiance is in the room. No data found. Patient Vitals for the past 24 hrs:   Pulse   09/02/18 1530 (!) 110   09/02/18 1400 (!) 114   09/02/18 1330 (!) 112   09/02/18 1240 (!) 110   09/02/18 1149 (!) 106    Patient Vitals for the past 24 hrs:   Resp   09/02/18 1530 24   09/02/18 1400 (!) 37   09/02/18 1330 24   09/02/18 1240 (!) 34   09/02/18 1149 18    Patient Vitals for the past 24 hrs:   BP   09/02/18 1530 94/59   09/02/18 1400 91/47   09/02/18 1330 97/52   09/02/18 1240 117/47   09/02/18 1149 114/62        SpO2 Readings from Last 6 Encounters:   09/02/18 100%   06/11/18 95%   03/14/18 99%   03/09/18 100%   02/23/18 100%   02/04/18 100%         O2 Device: Room air   Body mass index is 21.12 kg/(m^2). - Wt Readings from Last 10 Encounters:   09/02/18 64.9 kg (143 lb)   06/08/18 61.4 kg (135 lb 5.8 oz)   03/11/18 63.8 kg (140 lb 10.5 oz)   03/09/18 66.7 kg (147 lb 0.8 oz)   02/23/18 63.8 kg (140 lb 10.5 oz)   02/04/18 65.4 kg (144 lb 2.9 oz)   08/23/17 61 kg (134 lb 8 oz)   03/07/17 60.8 kg (134 lb)   09/27/16 60.6 kg (133 lb 9.6 oz)   09/17/16 63.6 kg (140 lb 3.4 oz)          Physical Exam:             General:  Alert, fatigued   Not very cooperative. well noursished,   well developed,   appears stated age    Ears/Eyes:  Hearing intact  Sclera anicteric.    Pupils equal   Mouth/Throat:  Mucous membranes normal pink and dry      Neck:     Lungs:  Trachea midline  Chest excursion symmetrical   Auscultation B/L Symmetrical with   Vesicular breath sounds     No Crepitations noted   Percussion note resonant on mid Clavicular line; no sign of pneumothorax        CVS:  Regular rate and rhythm   no  murmur,   No click, rub or gallop  S1 normal   S2 normal   Pedal pulses  b/l symmetrical   Abdomen:    Soft, non-tender  Bowel sounds normal  No distension   Percussion note tympanitic   Extremities:    No cyanosis, jaundice  No edema noted   No sign of DVT/cord like lesion on palpation  No sign of acute trauma   Age appropriate OA changes noted. Skin:    Skin color, texture, turgor normal. no acute rash or lesions    Lymph nodes:     Musculoskeletal Muscle bulk B/L symmetrical   Neuro Cranial nerves are intact,   motor movement b/l symmetrical,   Sensory evaluation not completely done due to lack of cooperation    Psych:  Alert and fatigued             Medications reviewed     Current Facility-Administered Medications   Medication Dose Route Frequency    insulin regular (NOVOLIN R, HUMULIN R) 100 Units in 0.9% sodium chloride 100 mL infusion  0-50 Units/hr IntraVENous TITRATE    insulin lispro (HUMALOG) injection   SubCUTAneous TIDAC    glucose chewable tablet 16 g  4 Tab Oral PRN    dextrose (D50W) injection syrg 12.5-25 g  25-50 mL IntraVENous PRN    glucagon (GLUCAGEN) injection 1 mg  1 mg IntraMUSCular PRN     Current Outpatient Prescriptions   Medication Sig    acetaminophen (TYLENOL EXTRA STRENGTH) 500 mg tablet Take 1,000 mg by mouth two (2) times daily as needed ('Pain/Headache').  insulin regular (NOVOLIN R) 100 unit/mL injection 2-10 Units by SubCUTAneous route See Admin Instructions. Take 3-4 times daily per sliding scale; Patient is not sure of exact scale.'    insulin glargine (LANTUS) 100 unit/mL injection 46 Units by SubCUTAneous route daily. Relevant other informations:      Other medical conditions listed in this following [facility] hospital problem list section; all of these and other pertinent data were taken into consideration when treatment plan is developed and customized to this patient's unique overall circumstances and needs. We have reviewed available old medical records within the constraints of this admission process. Present on Admission:   [facility]diabetic k[facility]acidoses) ([facility] [address]   Tobacco abuse   Diabetic neuropathy, type I diabetes mellitus ([facility] [address]   HTN (hypertension)   Seizure ([facility]   Tachycardia       Data Review:   Recent Days:  All Micro Results     None          Recent Labs      09/02/18   1233   WBC  24.9*   HGB  15.2   HCT  47.4   PLT  389     Recent Labs      09/02/18   1233   NA  122*   K  6.3*   CL  83*   CO2  <5*   GLU  1053*   BUN  54*   CREA  2.23*   CA  9.5   MG  2.9*   PHOS  9.4*   ALB  3.9   TBILI  0.5   SGOT  22   ALT  64      Lab Results   Component Value Date/Time    TSH 5.31 (H) 03/12/2018 03:33 AM         Care Plan discussed with: Patient/Family and Nurse   Other medical conditions are listed in the active hospital problem list section; these and other pertinent data were taken into consideration when the treatment plan was developed and customized to this patient's unique overall circumstances and needs. High complexity decision making was performed for this patient who is at high risk for decompensation with multiple organ involvement. Today total floor/unit time was 60  minutes critical care while caring for this patient and greater than 50% of that time was spent with patient (and/or family) coordinating patients clinical issues.      Xavi Villarreal MD MPH  FACP                               9/2/2018       __________________________________________________________   FOR SOUND PHYSICIAN ADMINISTRATIVE USE ONLY   MDM       INPT 291      Ronnell Barraza MD MPH FACP   4:22 PM  9/2/2018

## 2018-09-02 NOTE — IP AVS SNAPSHOT
[address] [facility] 
[phone] Patient: Emil Palumbo MRN: [id number] NU About your hospitalization You were admitted on:  2018 You last received care in the:  [facility] 3 [facility] You were discharged on:  2018 Why you were hospitalized Your primary diagnosis was:  Dka (Diabetic Ketoacidoses) (Hcc) Your diagnoses also included: Tobacco Abuse, Diabetic Neuropathy, Type I Diabetes Mellitus (Hcc), Htn (Hypertension), Seizure (Hcc), Tachycardia Follow-up Information Follow up With Details Comments Contact Info Heather Hernández MD Go on 9/10/2018 For new patient appointment at 1:00PM  [facility] Internal Medicine [address] 
[phone] None   None (395) Patient stated that they have no PCP Your Scheduled Appointments Monday September 10, 2018  1:00 PM EDT  
PRE OP CPE with MD Preston Gross Internal Medicine [facility] [address]  
[phone] 2018 11:00 AM EDT  
DIABETES INDIVIDUAL 1HR with Sage Boyce [facility] DIABETIC TREATMENT ([address]) [place]  [address] [facility]  
[phone] Discharge Orders None A check wesley indicates which time of day the medication should be taken. My Medications START taking these medications Instructions Each Dose to Equal  
 Morning Noon Evening Bedtime  
 gabapentin 100 mg capsule Commonly known as:  NEURONTIN Your last dose was: Your next dose is: Take 2 Caps by mouth nightly. 200 mg  
    
   
   
   
  
 OTHER Your last dose was:     
   
Your next dose is:    
   
   
 Glucometer from [employer]   These are over the counter and very affordable  
     
   
   
   
  
 pantoprazole 40 mg tablet Commonly known as:  PROTONIX Start taking on:  2018 Your last dose was: Your next dose is: Take 1 Tab by mouth Daily (before breakfast). 40 mg CONTINUE taking these medications Instructions Each Dose to Equal  
 Morning Noon Evening Bedtime LANTUS U-100 INSULIN 100 unit/mL injection Generic drug:  insulin glargine Your last dose was: Your next dose is:    
   
   
 46 Units by SubCUTAneous route daily. 46 Units NovoLIN R Regular U-100 Insuln 100 unit/mL injection Generic drug:  insulin regular Your last dose was: Your next dose is:    
   
   
 2-10 Units by SubCUTAneous route See Admin Instructions. Take 3-4 times daily per sliding scale; Patient is not sure of exact scale.'  
 2-10 Units TYLENOL EXTRA STRENGTH 500 mg tablet Generic drug:  acetaminophen Your last dose was: Your next dose is: Take 1,000 mg by mouth two (2) times daily as needed ('Pain/Headache').  
 1000 mg Where to Get Your Medications Information on where to get these meds will be given to you by the nurse or doctor. ! Ask your nurse or doctor about these medications  
  gabapentin 100 mg capsule OTHER  
 pantoprazole 40 mg tablet Discharge Instructions HOSPITALIST DISCHARGE INSTRUCTIONS 
 
NAME: Savannah Clark :  1980 MRN:  [id number] Date/Time:  2018 2:39 PM 
 
ADMIT DATE: 2018 DISCHARGE DATE: 2018 · It is important that you take the medication exactly as they are prescribed. · Keep your medication in the bottles provided by the pharmacist and keep a list of the medication names, dosages, and times to be taken in your wallet. · Do not take other medications without consulting your doctor. What to do at [facility] Recommended diet:  Diabetic Diet   No Concentrated Sweets Recommended activity: Activity as tolerated If you have questions regarding the hospital related prescriptions or hospital related issues please call [facility] at [phone]. You can always direct your questions to your primary care doctor if you are unable to reach your hospital physician; your PCP works as an extension of your hospital doctor just like your hospital doctor is an extension of your PCP for your time at the hospital [facility], [facility]) If you experience any of the following symptoms then please call your primary care physician or return to the emergency room if you cannot get hold of your doctor: 
 
Fever, chills, nausea, vomiting, or persistent diarrhea Worsening weakness or new problems with your speech or balance Dark stools or visible blood in your stools New Leg swelling or shortness of breath as these could be signs of a clot Additional Instructions: 
 
MONITOR YOUR BLOOD SUGARS Check it Fasting and before Lunch and Before Dinner and at Bedtime This is the only way your PCP will be able to help you adjust your Insulin Please Follow up with your Assigned PCP Bring these papers with you to your follow up appointments. The papers will help your doctors be sure to continue the care plan from the hospital. 
 
 
 
 
 
 
Information obtained by : 
I understand that if any problems occur once I am at home I am to contact my physician. I understand and acknowledge receipt of the instructions indicated above. Physician's or R.N.'s Signature                                                                  Date/Time Patient or Representative Signature Learning About Diabetes Food Guidelines Your Care Instructions Meal planning is important to manage diabetes. It helps keep your blood sugar at a target level (which you set with your doctor). You don't have to eat special foods. You can eat what your family eats, including sweets once in a while. But you do have to pay attention to how often you eat and how much you eat of certain foods. You may want to work with a dietitian or a certified diabetes educator (CDE) to help you plan meals and snacks. A dietitian or CDE can also help you lose weight if that is one of your goals. What should you know about eating carbs? Managing the amount of carbohydrate (carbs) you eat is an important part of healthy meals when you have diabetes. Carbohydrate is found in many foods. · Learn which foods have carbs. And learn the amounts of carbs in different foods. ¨ Bread, cereal, pasta, and rice have about 15 grams of carbs in a serving. A serving is 1 slice of bread (1 ounce), ½ cup of cooked cereal, or 1/3 cup of cooked pasta or rice. ¨ Fruits have 15 grams of carbs in a serving. A serving is 1 small fresh fruit, such as an apple or orange; ½ of a banana; ½ cup of cooked or canned fruit; ½ cup of fruit juice; 1 cup of melon or raspberries; or 2 tablespoons of dried fruit. ¨ Milk and no-sugar-added yogurt have 15 grams of carbs in a serving. A serving is 1 cup of milk or 2/3 cup of no-sugar-added yogurt. ¨ Starchy vegetables have 15 grams of carbs in a serving. A serving is ½ cup of mashed potatoes or sweet potato; 1 cup winter squash; ½ of a small baked potato; ½ cup of cooked beans; or ½ cup cooked corn or green peas. · Learn how much carbs to eat each day and at each meal. A dietitian or CDE can teach you how to keep track of the amount of carbs you eat.  This is called carbohydrate counting. · If you are not sure how to count carbohydrate grams, use the Plate Method to plan meals. It is a good, quick way to make sure that you have a balanced meal. It also helps you spread carbs throughout the day. ¨ Divide your plate by types of foods. Put non-starchy vegetables on half the plate, meat or other protein food on one-quarter of the plate, and a grain or starchy vegetable in the final quarter of the plate. To this you can add a small piece of fruit and 1 cup of milk or yogurt, depending on how many carbs you are supposed to eat at a meal. 
· Try to eat about the same amount of carbs at each meal. Do not \"save up\" your daily allowance of carbs to eat at one meal. 
· Proteins have very little or no carbs per serving. Examples of proteins are beef, chicken, turkey, fish, eggs, tofu, cheese, cottage cheese, and peanut butter. A serving size of meat is 3 ounces, which is about the size of a deck of cards. Examples of meat substitute serving sizes (equal to 1 ounce of meat) are 1/4 cup of cottage cheese, 1 egg, 1 tablespoon of peanut butter, and ½ cup of tofu. How can you eat out and still eat healthy? · Learn to estimate the serving sizes of foods that have carbohydrate. If you measure food at home, it will be easier to estimate the amount in a serving of restaurant food. · If the meal you order has too much carbohydrate (such as potatoes, corn, or baked beans), ask to have a low-carbohydrate food instead. Ask for a salad or green vegetables. · If you use insulin, check your blood sugar before and after eating out to help you plan how much to eat in the future. · If you eat more carbohydrate at a meal than you had planned, take a walk or do other exercise. This will help lower your blood sugar. What else should you know? · Limit saturated fat, such as the fat from meat and dairy products.  This is a healthy choice because people who have diabetes are at higher risk of heart disease. So choose lean cuts of meat and nonfat or low-fat dairy products. Use olive or canola oil instead of butter or shortening when cooking. · Don't skip meals. Your blood sugar may drop too low if you skip meals and take insulin or certain medicines for diabetes. · Check with your doctor before you drink alcohol. Alcohol can cause your blood sugar to drop too low. Alcohol can also cause a bad reaction if you take certain diabetes medicines. Follow-up care is a key part of your treatment and safety. Be sure to make and go to all appointments, and call your doctor if you are having problems. It's also a good idea to know your test results and keep a list of the medicines you take. Where can you learn more? Go to http://dwaine-sanjeev.info/. Enter I305 in the search box to learn more about \"Learning About Diabetes Food Guidelines. \" Current as of: December 7, 2017 Content Version: 11.7 © 2446-4927 [employer]. Care instructions adapted under license by [employer] (which disclaims liability or warranty for this information). If you have questions about a medical condition or this instruction, always ask your healthcare professional. [address] any warranty or liability for your use of this information. Learning About Meal Planning for Diabetes Why plan your meals? Meal planning can be a key part of managing diabetes. Planning meals and snacks with the right balance of carbohydrate, protein, and fat can help you keep your blood sugar at the target level you set with your doctor. You don't have to eat special foods. You can eat what your family eats, including sweets once in a while. But you do have to pay attention to how often you eat and how much you eat of certain foods. You may want to work with a dietitian or a certified diabetes educator.  He or she can give you tips and meal ideas and can answer your questions about meal planning. This health professional can also help you reach a healthy weight if that is one of your goals. What plan is right for you? Your dietitian or diabetes educator may suggest that you start with the plate format or carbohydrate counting. The plate format The plate format is a simple way to help you manage how you eat. You plan meals by learning how much space each food should take on a plate. Using the plate format helps you spread carbohydrate throughout the day. It can make it easier to keep your blood sugar level within your target range. It also helps you see if you're eating healthy portion sizes. To use the plate format, you put non-starchy vegetables on half your plate. Add meat or meat substitutes on one-quarter of the plate. Put a grain or starchy vegetable (such as brown rice or a potato) on the final quarter of the plate. You can add a small piece of fruit and some low-fat or fat-free milk or yogurt, depending on your carbohydrate goal for each meal. 
Here are some tips for using the plate format: · Make sure that you are not using an oversized plate. A 9-inch plate is best. Many restaurants use larger plates. · Get used to using the plate format at home. Then you can use it when you eat out. · Write down your questions about using the plate format. Talk to your doctor, a dietitian, or a diabetes educator about your concerns. Carbohydrate counting With carbohydrate counting, you plan meals based on the amount of carbohydrate in each food. Carbohydrate raises blood sugar higher and more quickly than any other nutrient. It is found in desserts, breads and cereals, and fruit. It's also found in starchy vegetables such as potatoes and corn, grains such as rice and pasta, and milk and yogurt. Spreading carbohydrate throughout the day helps keep your blood sugar levels within your target range.  
Your daily amount depends on several things, including your weight, how active you are, which diabetes medicines you take, and what your goals are for your blood sugar levels. A registered dietitian or diabetes educator can help you plan how much carbohydrate to include in each meal and snack. A guideline for your daily amount of carbohydrate is: · 45 to 60 grams at each meal. That's about the same as 3 to 4 carbohydrate servings. · 15 to 20 grams at each snack. That's about the same as 1 carbohydrate serving. The Nutrition Facts label on packaged foods tells you how much carbohydrate is in a serving of the food. First, look at the serving size on the food label. Is that the amount you eat in a serving? All of the nutrition information on a food label is based on that serving size. So if you eat more or less than that, you'll need to adjust the other numbers. Total carbohydrate is the next thing you need to look for on the label. If you count carbohydrate servings, one serving of carbohydrate is 15 grams. For foods that don't come with labels, such as fresh fruits and vegetables, you'll need a guide that lists carbohydrate in these foods. Ask your doctor, dietitian, or diabetes educator about books or other nutrition guides you can use. If you take insulin, you need to know how many grams of carbohydrate are in a meal. This lets you know how much rapid-acting insulin to take before you eat. If you use an insulin pump, you get a constant rate of insulin during the day. So the pump must be programmed at meals to give you extra insulin to cover the rise in blood sugar after meals. When you know how much carbohydrate you will eat, you can take the right amount of insulin. Or, if you always use the same amount of insulin, you need to make sure that you eat the same amount of carbohydrate at meals. If you need more help to understand carbohydrate counting and food labels, ask your doctor, dietitian, or diabetes educator. How do you get started with meal planning? Here are some tips to get started: 
· Plan your meals a week at a time. Don't forget to include snacks too. · Use cookbooks or online recipes to plan several main meals. Plan some quick meals for busy nights. You also can double some recipes that freeze well. Then you can save half for other busy nights when you don't have time to cook. · Make sure you have the ingredients you need for your recipes. If you're running low on basic items, put these items on your shopping list too. · List foods that you use to make breakfasts, lunches, and snacks. List plenty of fruits and vegetables. · Post this list on the refrigerator. Add to it as you think of more things you need. · Take the list to the store to do your weekly shopping. Follow-up care is a key part of your treatment and safety. Be sure to make and go to all appointments, and call your doctor if you are having problems. It's also a good idea to know your test results and keep a list of the medicines you take. Where can you learn more? Go to http://dwaineHighScore Housesanjeev.info/. Cynthia Machuca in the search box to learn more about \"Learning About Meal Planning for Diabetes. \" Current as of: December 7, 2017 Content Version: 11.7 © 4917-4302 [employer]. Care instructions adapted under license by [employer] (which disclaims liability or warranty for this information). If you have questions about a medical condition or this instruction, always ask your healthcare professional. [address] any warranty or liability for your use of this information. Diabetes Blood Sugar Emergencies: Your Action Plan How can you prevent a blood sugar emergency? An important part of living with diabetes is keeping your blood sugar in your target range. You'll need to know what to do if it's too high or too low. Managing your blood sugar levels helps you avoid emergencies.  This care sheet will teach you about the signs of high and low blood sugar. It will help you make an action plan with your doctor for when these signs occur. Low blood sugar is more likely to happen if you take certain medicines for diabetes. It can also happen if you skip a meal, drink alcohol, or exercise more than usual. 
You may get high blood sugar if you eat differently than you normally do. One example is eating more carbohydrate than usual. Having a cold, the flu, or other sudden illness can also cause high blood sugar levels. Levels can also rise if you miss a dose of medicine. Any change in how you take your medicine may affect your blood sugar level. So it's important to work with your doctor before you make any changes. Check your blood sugar Work with your doctor to fill in the blank spaces below that apply to you. Track your levels, know your target range, and write down ways you can get your blood sugar back in your target range. A log book can help you track your levels. Take the book to all of your medical appointments. · Check your blood sugar _____ times a day, at these times:________________________________________________. (For example: Before meals, at bedtime, before exercise, during exercise, other.) · Your blood sugar target range before a meal is ___________________. Your blood sugar target range after a meal is _______________________. · Do caqz-___________________________________________________-wg get your blood sugar back within your safe range if your blood sugar results are _________________________________________. (For example: Less than 70 or above 250 mg/dL.) Call your doctor when your blood sugar results are ___________________________________. (For example: Less than 70 or above 250 mg/dL.) What are the symptoms of low and high blood sugar? Common symptoms of low blood sugar are sweating and feeling shaky, weak, hungry, or confused. Symptoms can start quickly. Common symptoms of high blood sugar are feeling very thirsty or very hungry. You may also pass urine more often than usual. You may have blurry vision and may lose weight without trying. But some people may have high or low blood sugar without having any symptoms. That's a good reason to check your blood sugar on a regular schedule. What should you do if you have symptoms? Work with your doctor to fill in the blank spaces below that apply to you. Low blood sugar If you have symptoms of low blood sugar, check your blood sugar. If it's below _____ ( for example, below 70), eat or drink a quick-sugar food that has about 15 grams of carbohydrate. Your goal is to get your level back to your safe range. Check your blood sugar again 15 minutes later. If it's still not in your target range, take another 15 grams of carbohydrate and check your blood sugar again in 15 minutes. Repeat this until you reach your target. Then go back to your regular testing schedule. When you have low blood sugar, it's best to stop or reduce any physical activity until your blood sugar is back in your target range and is stable. If you must stay active, eat or drink 30 grams of carbohydrate. Then check your blood sugar again in 15 minutes. If it's not in your target range, take another 30 grams of carbohydrates. Check your blood sugar again in 15 minutes. Keep doing this until you reach your target. You can then go back to your regular testing schedule. If your symptoms or blood sugar levels are getting worse or have not improved after 15 minutes, seek medical care right away. Here are some examples of quick-sugar foods with 15 grams of carbohydrate: · 3 or 4 glucose tablets · 1 tube of glucose gel · Hard candy (such as 3 Jolly Ranchers or 5 to [employer]) · ½ cup to ¾ cup (4 to 6 ounces) of fruit juice or regular (not diet) soda High blood sugar If you have symptoms of high blood sugar, check your blood sugar.  Your goal is to get your level back to your target range. If it's above ______ ( for example, above 250), follow these steps: · If you missed a dose of your diabetes medicine, take it now. Take only the amount of medicine that you have been prescribed. Do not take more or less medicine. · Give yourself insulin if your doctor has prescribed it for high blood sugar. · Test for ketones, if the doctor told you to do so. If the results of the ketone test show a moderate-to-large amount of ketones, call the doctor for advice. · Wait 30 minutes after you take the extra insulin or the missed medicine. Check your blood sugar again. If your symptoms or blood sugar levels are getting worse or have not improved after taking these steps, seek medical care right away. Follow-up care is a key part of your treatment and safety. Be sure to make and go to all appointments, and call your doctor if you are having problems. It's also a good idea to know your test results and keep a list of the medicines you take. Where can you learn more? Go to http://dwaine-sanjeev.info/. Enter X249 in the search box to learn more about \"Diabetes Blood Sugar Emergencies: Your Action Plan. \" Current as of: December 7, 2017 Content Version: 11.7 © 7524-8626 [employer]. Care instructions adapted under license by [employer] (which disclaims liability or warranty for this information). If you have questions about a medical condition or this instruction, always ask your healthcare professional. [place] [address] any warranty or liability for your use of this information. Counting Carbohydrates: Care Instructions Your Care Instructions You don't have to eat special foods when you have diabetes. You just have to be careful to eat healthy foods. Carbohydrates (carbs) raise blood sugar higher and quicker than any other nutrient.  Carbs are found in desserts, breads and cereals, and fruit. They're also in starchy vegetables. These include potatoes, corn, and grains such as rice and pasta. Carbs are also in milk and yogurt. The more carbs you eat at one time, the higher your blood sugar will rise. Spreading carbs all through the day helps keep your blood sugar levels within your target range. Counting carbs is one of the best ways to keep your blood sugar under control. If you use insulin, counting carbs helps you match the right amount of insulin to the number of grams of carbs in a meal. Then you can change your diet and insulin dose as needed. Testing your blood sugar several times a day can help you learn how carbs affect your blood sugar. A registered dietitian or certified diabetes educator can help you plan meals and snacks. Follow-up care is a key part of your treatment and safety. Be sure to make and go to all appointments, and call your doctor if you are having problems. It's also a good idea to know your test results and keep a list of the medicines you take. How can you care for yourself at home? Know your daily amount of carbohydrates Your daily amount depends on several things, such as your weight, how active you are, which diabetes medicines you take, and what your goals are for your blood sugar levels. A registered dietitian or certified diabetes educator can help you plan how many carbs to include in each meal and snack. For most adults, a guideline for the daily amount of carbs is: · 45 to 60 grams at each meal. That's about the same as 3 to 4 carbohydrate servings. · 15 to 20 grams at each snack. That's about the same as 1 carbohydrate serving. Count carbs Counting carbs lets you know how much rapid-acting insulin to take before you eat. If you use an insulin pump, you get a constant rate of insulin during the day. So the pump must be programmed at meals. This gives you extra insulin to cover the rise in blood sugar after meals. If you take insulin: 
· Learn your own insulin-to-carb ratio. You and your diabetes health professional will figure out the ratio. You can do this by testing your blood sugar after meals. For example, you may need a certain amount of insulin for every 15 grams of carbs. · Add up the carb grams in a meal. Then you can figure out how many units of insulin to take based on your insulin-to-carb ratio. · Exercise lowers blood sugar. You can use less insulin than you would if you were not doing exercise. Keep in mind that timing matters. If you exercise within 1 hour after a meal, your body may need less insulin for that meal than it would if you exercised 3 hours after the meal. Test your blood sugar to find out how exercise affects your need for insulin. If you do or don't take insulin: 
· Look at labels on packaged foods. This can tell you how many carbs are in a serving. You can also use guides from the American Diabetes Association. · Be aware of portions, or serving sizes. If a package has two servings and you eat the whole package, you need to double the number of grams of carbohydrate listed for one serving. · Protein, fat, and fiber do not raise blood sugar as much as carbs do. If you eat a lot of these nutrients in a meal, your blood sugar will rise more slowly than it would otherwise. Eat from all food groups · Eat at least three meals a day. · Plan meals to include food from all the food groups. The food groups include grains, fruits, dairy, proteins, and vegetables. · Talk to your dietitian or diabetes educator about ways to add limited amounts of sweets into your meal plan. · If you drink alcohol, talk to your doctor. It may not be recommended when you are taking certain diabetes medicines. Where can you learn more? Go to http://dwaine-sanjeev.info/. Enter X080 in the search box to learn more about \"Counting Carbohydrates: Care Instructions. \" Current as of: December 7, 2017 Content Version: 11.7 © 7833-6218 Picmonic. Care instructions adapted under license by Chatty (which disclaims liability or warranty for this information). If you have questions about a medical condition or this instruction, always ask your healthcare professional. [address] any warranty or liability for your use of this information. Learning About Diabetes and Exercise Can you exercise if you have diabetes? When you have diabetes, it's important to get regular exercise. This helps control your blood sugar level. You can still play sports, run, ride a bike, go swimming, and do other activities when you have diabetes. How can exercise help you manage diabetes? Your body turns the food you eat into glucose, a type of sugar. You need this sugar for energy. When you have diabetes, the sugar builds up in your blood. But when you exercise, your body uses sugar. This helps keep it from building up in your blood and results in lower blood sugar and better control of diabetes. Exercise may help you in other ways too. It can help you reach and stay at a healthy weight. It also helps improve blood pressure and cholesterol, which can reduce the risk of heart disease. Exercise can make you feel stronger and happier. It can help you relax and sleep better, and give you confidence in other things you do. How can you exercise safely? Before you start a new exercise program, talk to your doctor about how and when to exercise. You may need to have a medical exam and tests before you begin. Some types of exercise can be harmful if your diabetes is causing other problems, such as problems with your feet. Your doctor can tell you what types of exercise are good choices for you. These tips can help you exercise safely when you have diabetes.  If your diabetes is controlled by diet or medicine that doesn't lower your blood sugar, you don't need to eat a snack before you exercise. · Check your blood sugar before you exercise. And be careful about what you eat. ¨ If your blood sugar is less than 100, eat a carbohydrate snack before you exercise. ¨ Be careful when you exercise if your blood sugar is over 300. High blood sugar can make you dehydrated. And that makes your blood sugar levels go even higher. If you have ketones in your blood or urine and your blood sugar is over 300, do not exercise. · Don't try to do too much at first. Build up your exercise program bit by bit. Try to get at least 30 minutes of exercise on most days of the week. Walking is a good choice. You also may want to do other activities, such as riding a bike or swimming. You might try running or gardening. Try to include muscle-strengthening exercises at least 2 times a week. These exercises include push-ups and weight training. You can also use rubber tubing or stretch bands. You stretch or pull the tubing or band to build muscle strength. If you want to exercise more, slowly increase how hard or long you exercise. · You may get symptoms of low blood sugar during exercise or up to 24 hours later. Some symptoms of low blood sugar, such as sweating, a fast heartbeat, or feeling tired, can be confused with what can happen anytime you exercise. Other symptoms may include feeling anxious, dizzy, weak, or shaky. So it's a good idea to check your blood sugar again. · You can treat low blood sugar by eating or drinking something that has 15 grams of carbohydrate. These should be quick-sugar foods. Eddye Emms foods such as fruit juice, regular (not diet) soda, glucose tablets, hard candy, or raisins can help raise blood sugar. Check your blood sugar level again 15 minutes after having a quick-sugar food to make sure your level is getting back to your target range. · Drink plenty of water before, during, and after you exercise. · Wear medical alert jewelry that says you have diabetes. You can buy this at most drugstores. · Pay attention to your body. If you are used to exercise and notice that you can't do as much as usual, talk to your doctor. Follow-up care is a key part of your treatment and safety. Be sure to make and go to all appointments, and call your doctor if you are having problems. It's also a good idea to know your test results and keep a list of the medicines you take. Where can you learn more? Go to http://dwaineAperion Biologicssanjeev.info/. Enter G585 in the search box to learn more about \"Learning About Diabetes and Exercise. \" Current as of: December 7, 2017 Content Version: 11.7 © 2623-8823 Finderly. Care instructions adapted under license by Blink (which disclaims liability or warranty for this information). If you have questions about a medical condition or this instruction, always ask your healthcare professional. [place] [address] any warranty or liability for your use of this information. Diabetic Neuropathy: Care Instructions Your Care Instructions When you have diabetes, your blood sugar level may get too high. Over time, high blood sugar levels can damage nerves. This is called diabetic neuropathy. Nerve damage can cause pain, burning, tingling, and numbness and may leave you feeling weak. The feet are often affected. When you have nerve damage in your feet, you cannot feel your feet and toes as well as normal and may not notice cuts or sores. Even a small injury can lead to a serious infection. It is very important that you follow your doctor's advice on foot care. Sometimes diabetes damages nerves that help the body function. If this happens, your blood pressure, sweating, digestion, and urination might be affected.  Your doctor may give you a target blood sugar level that is higher or lower than you are used to. Try to keep your blood sugar very close to this target level to prevent more damage. Follow-up care is a key part of your treatment and safety. Be sure to make and go to all appointments, and call your doctor if you are having problems. It's also a good idea to know your test results and keep a list of the medicines you take. How can you care for yourself at home? · Take your medicines exactly as prescribed. Call your doctor if you think you are having a problem with your medicine. It is very important that you take your insulin or diabetes pills as your doctor tells you. · Try to keep blood sugar at your target level. ¨ Eat a variety of healthy foods, with carbohydrate spread out in your meals. A dietitian can help you plan meals. ¨ Try to get at least 30 minutes of exercise on most days. ¨ Check your blood sugar as many times each day as your doctor recommends. · Take and record your blood pressure at home if your doctor tells you to. Learn the importance of the two measures of blood pressure (such as 130 over 80, or 130/80). To take your blood pressure at home: ¨ Ask your doctor to check your blood pressure monitor to be sure it is accurate and the cuff fits you. Also ask your doctor to watch you to make sure that you are using it right. ¨ Do not use medicine known to raise blood pressure (such as some nasal decongestant sprays) before taking your blood pressure. ¨ Avoid taking your blood pressure if you have just exercised or are nervous or upset. Rest at least 15 minutes before you take a reading. · Take pain medicines exactly as directed. ¨ If the doctor gave you a prescription medicine for pain, take it as prescribed. ¨ If you are not taking a prescription pain medicine, ask your doctor if you can take an over-the-counter medicine. · Do not smoke.  Smoking can increase your chance for a heart attack or stroke. If you need help quitting, talk to your doctor about stop-smoking programs and medicines. These can increase your chances of quitting for good. · Limit alcohol to 2 drinks a day for men and 1 drink a day for women. Too much alcohol can cause health problems. · Eat small meals often, rather than 2 or 3 large meals a day. To care for your feet · Prevent injury by wearing shoes at all times, even when you are indoors. · Do foot care as part of your daily routine. Wash your feet and then rub lotion on your feet, but not between your toes. Use a handheld mirror or magnifying mirror to inspect your feet for blisters, cuts, cracks, or sores. · Have your toenails trimmed and filed straight across. · Wear shoes and socks that fit well. Soft shoes that have good support and that fit well (such as tennis shoes) are best for your feet. · Check your shoes for any loose objects or rough edges before you put them on. · Ask your doctor to check your feet during each visit. Your doctor may notice a foot problem you have missed. · Get early treatment for any foot problem, even a minor one. When should you call for help? Call your doctor now or seek immediate medical care if: 
  · You have symptoms of infection, such as: 
¨ Increased pain, swelling, warmth, or redness. ¨ Red streaks leading from the area. ¨ Pus draining from the area. ¨ A fever.  
  · You have new or worse numbness, pain, or tingling in any part of your body.  
 Watch closely for changes in your health, and be sure to contact your doctor if: 
  · You have a new problem with your feet, such as: ¨ A new sore or ulcer. ¨ A break in the skin that is not healing after several days. ¨ Bleeding corns or calluses. ¨ An ingrown toenail.  
  · You do not get better as expected. Where can you learn more? Go to http://dwaine-sanjeev.info/. Enter T779 in the search box to learn more about \"Diabetic Neuropathy: Care Instructions. \" 
 Current as of: December 7, 2017 Content Version: 11.7 © 7669-3798 EaglEyeMed. Care instructions adapted under license by ListMinut (which disclaims liability or warranty for this information). If you have questions about a medical condition or this instruction, always ask your healthcare professional. [address] any warranty or liability for your use of this information. Introducing [facility]! Dear Abhilash Maravilla: 
Thank you for requesting a iMotions - Eye Tracking account. Our records indicate that you already have an active iMotions - Eye Tracking account. You can access your account anytime at [email or web address] Did you know that you can access your hospital and ER discharge instructions at any time in iMotions - Eye Tracking? You can also review all of your test results from your hospital stay or ER visit. Additional Information If you have questions, please visit the Frequently Asked Questions section of the iMotions - Eye Tracking website at [email or web address] Remember, iMotions - Eye Tracking is NOT to be used for urgent needs. For medical emergencies, dial 911. Now available from your iPhone and Android! Introducing Holland Urrutia As a [facility]ay[facility] Duster patient, I wanted to make you aware of our electronic visit tool called Holland Ghada. Romayne Keep Holdingstonya 24/7 allows you to connect within minutes with a medical provider 24 hours a day, seven days a week via a mobile device or tablet or logging into a secure website from your computer. You can access Holland Urrutia from anywhere in the [place].  
 
A virtual visit might be right for you when you have a simple condition and feel like you just dont want to get out of bed, or cant get away from work for an appointment, when your regular Romayne Duster provider is not available (evenings, weekends or holidays), or when youre out of town and need minor care. Electronic visits cost only $49 and if the Circlefive 24/sportif225 provider determines a prescription is needed to treat your condition, one can be electronically transmitted to a nearby pharmacy*. Please take a moment to enroll today if you have not already done so. The enrollment process is free and takes just a few minutes. To enroll, please download the Limtel/sportif225 renetta to your tablet or phone, or visit [email or web address] to enroll on your computer. And, as an [address] [place] [address] patient with a Deja View Concepts account, the results of your visits will be scanned into your electronic medical record and your primary care provider will be able to view the scanned results. We urge you to continue to see your regular [facility] provider for your ongoing medical care. And while your primary care provider may not be the one available when you seek a Mobile Fuelcorinnefin virtual visit, the peace of mind you get from getting a real diagnosis real time can be priceless. For more information on Exit41, view our Frequently Asked Questions (FAQs) at [email or web address]. Sincerely, 
 
Augustina Conti MD 
Chief Medical Officer [facility] Celeste Trevino *:  certain medications cannot be prescribed via Exit41 Providers Seen During Your Hospitalization Provider Specialty Primary office phone Samm Fuchs DO Emergency Medicine [phone] Leticia Castaneda MD Hospitalist [phone] Asia Hill MD Internal Medicine [phone] Vin Zepeda MD Internal Medicine [phone] Your Primary Care Physician (PCP) Primary Care Physician Office Phone Office Fax NONE ** None ** ** None ** You are allergic to the following No active allergies Recent Documentation Height Weight BMI Smoking Status 1.753 m 59.1 kg 19.24 kg/m2 Current Every Day Smoker Emergency Contacts Name Discharge Info Relation Home Work Mobile Khadijah Hill DISCHARGE CAREGIVER [3] Mother [14] [phone] [address] CAREGIVER [3] Other Relative [6] [phone] Patient Belongings The following personal items are in your possession at time of discharge: 
  Dental Appliances: None  Visual Aid: None      Home Medications: None   Jewelry: Body Piercing (Nipple rings)  Clothing: At bedside, Footwear, Pants, Slippers, Undergarments, With patient    Other Valuables: Cell Phone  Personal Items Sent to Safe: None Discharge Instructions Attachments/References GASTROPARESIS (ENGLISH) Patient Handouts Gastroparesis: Care Instructions Your Care Instructions When you have gastroparesis, your stomach takes a lot longer to empty. This delay can cause belly pain, bloating, and belching. It also can cause hiccups, heartburn, nausea or vomiting. You may not feel like eating. These symptoms may come and go. They most often occur during and after meals. You may feel full after only a few bites of food. This condition occurs when the nerves to the stomach don't work properly. Diabetes is the most common cause of this nerve damage. Gastroparesis can make it harder to control your blood sugar levels. But keeping your blood sugar levels under control may help with your symptoms. Parkinson's disease, stroke, and some medicines can also cause this condition. Home treatment can often help. Follow-up care is a key part of your treatment and safety. Be sure to make and go to all appointments, and call your doctor if you are having problems. It's also a good idea to know your test results and keep a list of the medicines you take. How can you care for yourself at home? · Eat several small meals each day rather than three large meals. · Eat foods that are low in fiber and fat.  
· If your doctor suggests it, take medicines that help the stomach empty more quickly. These are called motility agents. When should you call for help? Call your doctor now or seek immediate medical care if: 
  · You are vomiting.  
  · You have new or worse belly pain.  
  · You have a fever.  
  · You cannot pass stools or gas.  
 Watch closely for changes in your health, and be sure to contact your doctor if you have any problems. Where can you learn more? Go to http://dwaine-sanjeev.info/. Enter M106 in the search box to learn more about \"Gastroparesis: Care Instructions. \" Current as of: May 12, 2017 Content Version: 11.7 © 9289-9288 Ondango. Care instructions adapted under license by Parabel (which disclaims liability or warranty for this information). If you have questions about a medical condition or this instruction, always ask your healthcare professional. [address] any warranty or liability for your use of this information. Please provide this summary of care documentation to your next provider. Signatures-by signing, you are acknowledging that this After Visit Summary has been reviewed with you and you have received a copy. Patient Signature:  ____________________________________________________________ Date:  ____________________________________________________________  
  
Keanu Wharton Provider Signature:  ____________________________________________________________ Date:  ____________________________________________________________

## 2018-09-02 NOTE — ROUTINE PROCESS
Bedside and Verbal shift change report given to [facility] (oncoming nurse) by myself (offgoing nurse). Report included the following information SBAR, Kardex, ED Summary, Procedure Summary, Intake/Output, MAR, Accordion, Recent Results, Med Rec Status, Cardiac Rhythm sinus tachycardia, Alarm Parameters  and Quality Measures. I informed  of the BMP results, we will continue the current plan of care. Gluco Stabilizer as ordered.

## 2018-09-02 NOTE — IP AVS SNAPSHOT
[address] [facility] 
[phone] Patient: Kaela Adler MRN: [id number] IUQ:[id number] A check wesley indicates which time of day the medication should be taken. My Medications START taking these medications Instructions Each Dose to Equal  
 Morning Noon Evening Bedtime  
 gabapentin 100 mg capsule Commonly known as:  NEURONTIN Your last dose was: Your next dose is: Take 2 Caps by mouth nightly. 200 mg  
    
   
   
   
  
 OTHER Your last dose was: Your next dose is:    
   
   
 Glucometer from BeC[employer]   These are over the counter and very affordable  
     
   
   
   
  
 pantoprazole 40 mg tablet Commonly known as:  PROTONIX Start taking on:  9/6/2018 Your last dose was: Your next dose is: Take 1 Tab by mouth Daily (before breakfast). 40 mg CONTINUE taking these medications Instructions Each Dose to Equal  
 Morning Noon Evening Bedtime LANTUS U-100 INSULIN 100 unit/mL injection Generic drug:  insulin glargine Your last dose was: Your next dose is:    
   
   
 46 Units by SubCUTAneous route daily. 46 Units NovoLIN R Regular U-100 Insuln 100 unit/mL injection Generic drug:  insulin regular Your last dose was: Your next dose is:    
   
   
 2-10 Units by SubCUTAneous route See Admin Instructions. Take 3-4 times daily per sliding scale; Patient is not sure of exact scale.'  
 2-10 Units TYLENOL EXTRA STRENGTH 500 mg tablet Generic drug:  acetaminophen Your last dose was: Your next dose is: Take 1,000 mg by mouth two (2) times daily as needed ('Pain/Headache').  
 1000 mg Where to Get Your Medications Information on where to get these meds will be given to you by the nurse or doctor. ! Ask your nurse or doctor about these medications  
  gabapentin 100 mg capsule OTHER  
 pantoprazole 40 mg tablet

## 2018-09-02 NOTE — ED NOTES
Bedside and Verbal shift change report given to [address]  (oncoming nurse) by Froylan Langsotn RN  (offgoing nurse). Report included the following information SBAR, Kardex, ED Summary, Intake/Output, MAR, Recent Results and Med Rec Status.

## 2018-09-02 NOTE — ROUTINE PROCESS
TRANSFER - IN REPORT:  Verbal report received from MICHELLE Maldonado RN(name) on Patience Lowing  being received from myself(unit) for routine progression of care    Report consisted of patients Situation, Background, Assessment and   Recommendations(SBAR). Information from the following report(s) SBAR, Kardex, ED Summary, Procedure Summary, Intake/Output, MAR, Accordion, Recent Results, Med Rec Status, Cardiac Rhythm sinus tachycardia, Alarm Parameters  and Quality Measures was reviewed with the receiving nurse. Opportunity for questions and clarification was provided. Assessment completed upon patients arrival to unit and care assumed.

## 2018-09-02 NOTE — ED PROVIDER NOTES
EMERGENCY DEPARTMENT HISTORY AND PHYSICAL EXAM      Date: 9/2/2018  Patient Name: Kirsten Alonso    History of Presenting Illness     Chief Complaint   Patient presents with    Vomiting     onset last night       History Provided By: Patient    HPI: Kirsten Alonso, 30 y.o. male with PMHx significant for insulin dependant DM, presents to the ED with cc of nausea and vomiting that started last night. Pt notes that he has vomited too many times to count. Pt also notes generalized weakness, dizziness and malaise. He notes Sx are similar to prior episodes of DKA. He was most recently admitted in June for DKA. Pt states that he does not check his BS at home noting that he can't remember the last time he checked it. Pt last took his insulin yesterday. Notes that he felt too bad to go to work yesterday. There has been no treatment at home PTA today. There are no other complaints, changes, or physical findings at this time.     Social Hx: Tobacco (1 ppd), EtOH (social), Illicit drug use (denies)     PCP: None    Current Facility-Administered Medications   Medication Dose Route Frequency Provider Last Rate Last Dose    insulin regular (NOVOLIN R, HUMULIN R) 100 Units in 0.9% sodium chloride 100 mL infusion  0-50 Units/hr IntraVENous TITRATE Jeremiah Virgen,         insulin lispro (HUMALOG) injection   SubCUTAneous TIDAC Jeremiah Virgen DO   Stopped at 09/02/18 1303    glucose chewable tablet 16 g  4 Tab Oral PRN Jeremiah Virgen,         dextrose (D50W) injection syrg 12.5-25 g  25-50 mL IntraVENous PRN Jeremiah Virgen, DO        glucagon (GLUCAGEN) injection 1 mg  1 mg IntraMUSCular PRN Jeremiah Virgen, DO        sodium chloride 0.9 % bolus infusion 1,000 mL  1,000 mL IntraVENous NOW MANUEL Falcon 1,000 mL/hr at 09/02/18 1336 1,000 mL at 09/02/18 1336     Current Outpatient Prescriptions   Medication Sig Dispense Refill    acetaminophen (TYLENOL EXTRA STRENGTH) 500 mg tablet Take 1,000 mg by mouth two (2) times daily as needed ('Pain/Headache').  insulin regular (NOVOLIN R) 100 unit/mL injection 2-10 Units by SubCUTAneous route See Admin Instructions. Take 3-4 times daily per sliding scale; Patient is not sure of exact scale.'      insulin glargine (LANTUS) 100 unit/mL injection 46 Units by SubCUTAneous route daily. Past History     Past Medical History:  Past Medical History:   Diagnosis Date    Bipolar 1 disorder, depressed ([address]     Bipolar disorder ([address]     Depression     Diabetes ([address]     DKA, type 1 ([address] 1/27/2013    diagnosed age 23    H/O noncompliance with medical treatment, presenting hazards to health     MRSA (methicillin resistant staph aureus) culture positive     MRSA (methicillin resistant Staphylococcus aureus)     Face    Noncompliance with medication regimen     Second hand smoke exposure     Seizure ([address]     Seizures ([facility] 2006 or 2007    one episode during prison       Past Surgical History:  Past Surgical History:   Procedure Laterality Date    HX HEENT      top left wisdom tooth    HX ORTHOPAEDIC Left     wrist; MCV       Family History:  Family History   Problem Relation Age of Onset    Diabetes Mother     Diabetes Other      neice, type 1        Social History:  Social History   Substance Use Topics    Smoking status: Current Every Day Smoker     Packs/day: 1.00     Years: 16.00     Types: Cigarettes    Smokeless tobacco: Never Used    Alcohol use No       Allergies:  No Known Allergies      Review of Systems   Review of Systems   Constitutional: Positive for appetite change. Negative for chills, diaphoresis and fever. HENT: Negative for congestion, ear pain, rhinorrhea and sore throat. Respiratory: Negative for cough and shortness of breath. Cardiovascular: Negative for chest pain, palpitations and leg swelling. Gastrointestinal: Positive for nausea and vomiting. Negative for abdominal pain, constipation and diarrhea. Genitourinary: Negative for difficulty urinating, dysuria, frequency and hematuria. Musculoskeletal: Negative for arthralgias and myalgias. Neurological: Positive for dizziness, weakness and light-headedness. Negative for syncope and headaches. All other systems reviewed and are negative. Physical Exam   Physical Exam   Constitutional: He is oriented to person, place, and time. He appears well-developed and well-nourished. No distress. Thin 30 y.o.  male    HENT:   Head: Normocephalic and atraumatic. Eyes: Conjunctivae are normal. Right eye exhibits no discharge. Left eye exhibits no discharge. Neck: Normal range of motion. Neck supple. Cardiovascular: Regular rhythm and normal heart sounds. Tachycardia present. No murmur heard. Pulmonary/Chest: Effort normal and breath sounds normal. No respiratory distress. Abdominal: Soft. Bowel sounds are normal. He exhibits no distension. There is no tenderness. There is no rebound and no guarding. Lymphadenopathy:     He has no cervical adenopathy. Neurological: He is alert and oriented to person, place, and time. Skin: Skin is warm. He is diaphoretic. Psychiatric: He has a normal mood and affect. His behavior is normal.   Nursing note and vitals reviewed.       Diagnostic Study Results     Labs -     Recent Results (from the past 12 hour(s))   GLUCOSE, POC    Collection Time: 09/02/18 12:21 PM   Result Value Ref Range    Glucose (POC) >600 (HH) 65 - 100 mg/dL    Performed by Damian Duron    GLUCOSE, POC    Collection Time: 09/02/18 12:24 PM   Result Value Ref Range    Glucose (POC) >600 (HH) 65 - 100 mg/dL    Performed by Damian Duron    CBC WITH AUTOMATED DIFF    Collection Time: 09/02/18 12:33 PM   Result Value Ref Range    WBC 24.9 (H) 4.1 - 11.1 K/uL    RBC 4.69 4.10 - 5.70 M/uL    HGB 15.2 12.1 - 17.0 g/dL    HCT 47.4 36.6 - 50.3 %    .1 (H) 80.0 - 99.0 FL    MCH 32.4 26.0 - 34.0 PG    MCHC 32.1 30.0 - 36.5 g/dL RDW 13.2 11.5 - 14.5 %    PLATELET 849 326 - 139 K/uL    MPV 11.8 8.9 - 12.9 FL    NRBC 0.5 (H) 0  WBC    ABSOLUTE NRBC 0.12 (H) 0.00 - 0.01 K/uL    NEUTROPHILS 74 32 - 75 %    LYMPHOCYTES 14 12 - 49 %    MONOCYTES 9 5 - 13 %    EOSINOPHILS 1 0 - 7 %    BASOPHILS 1 0 - 1 %    IMMATURE GRANULOCYTES 1 (H) 0.0 - 0.5 %    ABS. NEUTROPHILS 18.3 (H) 1.8 - 8.0 K/UL    ABS. LYMPHOCYTES 3.5 0.8 - 3.5 K/UL    ABS. MONOCYTES 2.2 (H) 0.0 - 1.0 K/UL    ABS. EOSINOPHILS 0.3 0.0 - 0.4 K/UL    ABS. BASOPHILS 0.3 (H) 0.0 - 0.1 K/UL    ABS. IMM. GRANS. 0.3 (H) 0.00 - 0.04 K/UL    DF AUTOMATED      RBC COMMENTS NORMOCYTIC, NORMOCHROMIC     METABOLIC PANEL, COMPREHENSIVE    Collection Time: 09/02/18 12:33 PM   Result Value Ref Range    Sodium 122 (L) 136 - 145 mmol/L    Potassium 6.3 (H) 3.5 - 5.1 mmol/L    Chloride 83 (L) 97 - 108 mmol/L    CO2 <5 (LL) 21 - 32 mmol/L    Anion gap Cannot be calculated 5 - 15 mmol/L    Glucose 1053 (HH) 65 - 100 mg/dL    BUN 54 (H) 6 - 20 MG/DL    Creatinine 2.23 (H) 0.70 - 1.30 MG/DL    BUN/Creatinine ratio 24 (H) 12 - 20      GFR est AA 41 (L) >60 ml/min/1.73m2    GFR est non-AA 34 (L) >60 ml/min/1.73m2    Calcium 9.5 8.5 - 10.1 MG/DL    Bilirubin, total 0.5 0.2 - 1.0 MG/DL    ALT (SGPT) 64 12 - 78 U/L    AST (SGOT) 22 15 - 37 U/L    Alk. phosphatase 192 (H) 45 - 117 U/L    Protein, total 7.7 6.4 - 8.2 g/dL    Albumin 3.9 3.5 - 5.0 g/dL    Globulin 3.8 2.0 - 4.0 g/dL    A-G Ratio 1.0 (L) 1.1 - 2.2     MAGNESIUM    Collection Time: 09/02/18 12:33 PM   Result Value Ref Range    Magnesium 2.9 (H) 1.6 - 2.4 mg/dL   ACETONE/KETONE, QL    Collection Time: 09/02/18 12:34 PM   Result Value Ref Range    Acetone/Ketone serum, QL.  LARGE (A) NEG        URINALYSIS W/ REFLEX CULTURE    Collection Time: 09/02/18 12:34 PM   Result Value Ref Range    Color YELLOW/STRAW      Appearance CLEAR CLEAR      Specific gravity 1.025 1.003 - 1.030      pH (UA) 5.0 5.0 - 8.0      Protein TRACE (A) NEG mg/dL    Glucose >1000 (A) NEG mg/dL    Ketone 80 (A) NEG mg/dL    Bilirubin NEGATIVE  NEG      Blood NEGATIVE  NEG      Urobilinogen 0.2 0.2 - 1.0 EU/dL    Nitrites NEGATIVE  NEG      Leukocyte Esterase NEGATIVE  NEG      WBC 0-4 0 - 4 /hpf    RBC 0-5 0 - 5 /hpf    Epithelial cells FEW FEW /lpf    Bacteria NEGATIVE  NEG /hpf    UA:UC IF INDICATED CULTURE NOT INDICATED BY UA RESULT CNI      Hyaline cast 0-2 0 - 5 /lpf   BLOOD GAS, ARTERIAL    Collection Time: 09/02/18 12:50 PM   Result Value Ref Range    pH 7.05 (LL) 7.35 - 7.45      PCO2 16 (L) 35.0 - 45.0 mmHg    PO2 131 (H) 80 - 100 mmHg    O2 SAT 97 92 - 97 %    BICARBONATE 4 (L) 22 - 26 mmol/L    BASE DEFICIT 24.1 mmol/L    O2 METHOD ROOM AIR      SPONTANEOUS RATE 25.0      Sample source ARTERIAL      SITE RIGHT BRACHIAL      KEIKO'S TEST N/A      Critical value read back Cat Patterson MD    EKG, 12 LEAD, INITIAL    Collection Time: 09/02/18  1:29 PM   Result Value Ref Range    Ventricular Rate 110 BPM    Atrial Rate 110 BPM    P-R Interval 152 ms    QRS Duration 118 ms    Q-T Interval 356 ms    QTC Calculation (Bezet) 481 ms    Calculated P Axis 73 degrees    Calculated R Axis 106 degrees    Calculated T Axis 68 degrees    Diagnosis       Sinus tachycardia  Rightward axis  Pulmonary disease pattern  Nonspecific intraventricular conduction delay  When compared with ECG of 08-JUN-2018 11:26,  QRS duration has increased  T wave amplitude has increased in Inferior leads  T wave amplitude has increased in Anterolateral leads         Radiologic Studies - None    Medical Decision Making   I am the first provider for this patient. I reviewed the vital signs, available nursing notes, past medical history, past surgical history, family history and social history. Vital Signs-Reviewed the patient's vital signs.   Patient Vitals for the past 12 hrs:   Pulse Resp BP SpO2   09/02/18 1330 (!) 112 24 97/52 100 %   09/02/18 1240 (!) 110 (!) 34 117/47 100 %   09/02/18 1149 (!) 106 18 114/62 100 %     Records Reviewed: Nursing Notes    Provider Notes (Medical Decision Making):   DKA, gastroenteritis, dehydration, food poisoning, electrolyte abnl,     ED Course:   Initial assessment performed. The patients presenting problems have been discussed, and they are in agreement with the care plan formulated and outlined with them. I have encouraged them to ask questions as they arise throughout their visit. Tobacco Counseling  Discussed the risks of smoking and the benefits of smoking cessation as well as the long term sequelae of smoking with the patient. The patient verbalized their understanding. 12:37 PM  Case discussed with Dr. Martinez Clark.     1:24 PM  Lab called with critical lab values. Dr. Maritnez Clark aware and evaluating patient. CONSULT NOTE:  1:30 PM  MANUEL Valle spoke with Dr. Tori Sosa, Consult for Hospitalist. Discussed available diagnostic tests and clinical findings. He is in agreement with care plans as outlined. He will admit the patient to the hospital.      Critical Care Time:   CRITICAL CARE NOTE :  1:32 PM  IMPENDING DETERIORATION -Cardiovascular, Metabolic and Renal  ASSOCIATED RISK FACTORS - Dysrhythmia, Metabolic changes, Dehydration and Vascular Compromise  MANAGEMENT- Bedside Assessment and Supervision of Care  INTERPRETATION -  Blood Gases, ECG and Blood Pressure  INTERVENTIONS - hemodynamic mngmt, Neurologic interventions  and Metobolic interventions  CASE REVIEW - Hospitalist, Nursing and Family  TREATMENT RESPONSE -Improved  PERFORMED BY - MANUEL Valle, MAGO HAILE II.VIERTEL, DO  NOTES:  I have spent 60 minutes of critical care time involved in lab review, consultations with specialist, family decision- making, bedside attention and documentation. During this entire length of time I was immediately available to the patient . Zonia Valle    Admit Note:  1:43 PM  Pt is being admitted by internal medicine.  The results of their tests and reason(s) for their admission have been discussed with pt and/or available family. They convey agreement and understanding for the need to be admitted and for admission diagnosis. Diagnosis     Clinical Impression:   1. Diabetic ketoacidosis without coma associated with type 1 diabetes mellitus ([facility] [address]    2. Acute renal failure, unspecified acute renal failure type ([facility]            1:30 PM  I have personally seen and examined the patient, reviewed the RUFUS's note and agree with findings and plan. MANUEL Chino     The patient presents with:  Nausea and vomiting since last night, hyperglycemia    My exam shows:     General: NAD, thin  HEENT: EOMI, non-icteric sclera, dry mucous membrnaes  Chest: tachycardic, no m/r/g  Lungs: CTAB  Abd: nt, nd, soft, +bs  Ext: no peripheral edema, no cyanosis, +2 peripheral pulses  Skin: no rashes or lesions  Neuro: no focal deficits      Impression/Plan:  Pt hyperglycemic with n,v high suspicion for DKA. Will check labs, urine. Will treat with ivf, insulin, antiemetics. Likely admit. I have reviewed and agree with the MLP's findings, including all diagnostic interpretations, and plans as written. I was present during the key portions of separately billed procedures.    Zonia Chino

## 2018-09-02 NOTE — ROUTINE PROCESS
Critical Result Notification    Received and verbally repeated the following test results CO2, 8, from ASHLEY Palma (NAME OF TECHNICIAN) on 09/02/18 (DATE), at 1900 (TIME). (NAME OF PROVIDER) was notified and provided a verbal readback of the results listed above on 09/02/18(DATE) at 1930(TIME). Orders were not received at this time.     Additional comments:Improving sodium, potassium, CO2, and glucose levels    Minus ASAD Marinelli

## 2018-09-03 LAB
ADMINISTERED INITIALS, ADMINIT: NORMAL
AMPHET UR QL SCN: NEGATIVE
ANION GAP SERPL CALC-SCNC: 8 MMOL/L (ref 5–15)
ANION GAP SERPL CALC-SCNC: 8 MMOL/L (ref 5–15)
ATRIAL RATE: 110 BPM
BARBITURATES UR QL SCN: NEGATIVE
BASOPHILS # BLD: 0 K/UL (ref 0–0.1)
BASOPHILS NFR BLD: 0 % (ref 0–1)
BENZODIAZ UR QL: NEGATIVE
BUN SERPL-MCNC: 33 MG/DL (ref 6–20)
BUN SERPL-MCNC: 39 MG/DL (ref 6–20)
BUN/CREAT SERPL: 36 (ref 12–20)
BUN/CREAT SERPL: 37 (ref 12–20)
CALCIUM SERPL-MCNC: 6.2 MG/DL (ref 8.5–10.1)
CALCIUM SERPL-MCNC: 8.2 MG/DL (ref 8.5–10.1)
CALCULATED P AXIS, ECG09: 73 DEGREES
CALCULATED R AXIS, ECG10: 106 DEGREES
CALCULATED T AXIS, ECG11: 68 DEGREES
CANNABINOIDS UR QL SCN: NEGATIVE
CHLORIDE SERPL-SCNC: 116 MMOL/L (ref 97–108)
CHLORIDE SERPL-SCNC: 125 MMOL/L (ref 97–108)
CO2 SERPL-SCNC: 18 MMOL/L (ref 21–32)
CO2 SERPL-SCNC: 25 MMOL/L (ref 21–32)
COCAINE UR QL SCN: NEGATIVE
CREAT SERPL-MCNC: 0.89 MG/DL (ref 0.7–1.3)
CREAT SERPL-MCNC: 1.07 MG/DL (ref 0.7–1.3)
D50 ADMINISTERED, D50ADM: 0 ML
D50 ORDER, D50ORD: 0 ML
DIAGNOSIS, 93000: NORMAL
DIFFERENTIAL METHOD BLD: ABNORMAL
DRUG SCRN COMMENT,DRGCM: ABNORMAL
EOSINOPHIL # BLD: 0.1 K/UL (ref 0–0.4)
EOSINOPHIL NFR BLD: 1 % (ref 0–7)
ERYTHROCYTE [DISTWIDTH] IN BLOOD BY AUTOMATED COUNT: 12.8 % (ref 11.5–14.5)
GLSCOM COMMENTS: NORMAL
GLUCOSE BLD STRIP.AUTO-MCNC: 114 MG/DL (ref 65–100)
GLUCOSE BLD STRIP.AUTO-MCNC: 116 MG/DL (ref 65–100)
GLUCOSE BLD STRIP.AUTO-MCNC: 124 MG/DL (ref 65–100)
GLUCOSE BLD STRIP.AUTO-MCNC: 127 MG/DL (ref 65–100)
GLUCOSE BLD STRIP.AUTO-MCNC: 128 MG/DL (ref 65–100)
GLUCOSE BLD STRIP.AUTO-MCNC: 132 MG/DL (ref 65–100)
GLUCOSE BLD STRIP.AUTO-MCNC: 134 MG/DL (ref 65–100)
GLUCOSE BLD STRIP.AUTO-MCNC: 138 MG/DL (ref 65–100)
GLUCOSE BLD STRIP.AUTO-MCNC: 140 MG/DL (ref 65–100)
GLUCOSE BLD STRIP.AUTO-MCNC: 140 MG/DL (ref 65–100)
GLUCOSE BLD STRIP.AUTO-MCNC: 141 MG/DL (ref 65–100)
GLUCOSE BLD STRIP.AUTO-MCNC: 142 MG/DL (ref 65–100)
GLUCOSE BLD STRIP.AUTO-MCNC: 159 MG/DL (ref 65–100)
GLUCOSE BLD STRIP.AUTO-MCNC: 159 MG/DL (ref 65–100)
GLUCOSE BLD STRIP.AUTO-MCNC: 176 MG/DL (ref 65–100)
GLUCOSE BLD STRIP.AUTO-MCNC: 184 MG/DL (ref 65–100)
GLUCOSE BLD STRIP.AUTO-MCNC: 187 MG/DL (ref 65–100)
GLUCOSE BLD STRIP.AUTO-MCNC: 189 MG/DL (ref 65–100)
GLUCOSE BLD STRIP.AUTO-MCNC: 191 MG/DL (ref 65–100)
GLUCOSE BLD STRIP.AUTO-MCNC: 202 MG/DL (ref 65–100)
GLUCOSE BLD STRIP.AUTO-MCNC: 216 MG/DL (ref 65–100)
GLUCOSE BLD STRIP.AUTO-MCNC: 293 MG/DL (ref 65–100)
GLUCOSE SERPL-MCNC: 143 MG/DL (ref 65–100)
GLUCOSE SERPL-MCNC: 339 MG/DL (ref 65–100)
GLUCOSE, GLC: 114 MG/DL
GLUCOSE, GLC: 116 MG/DL
GLUCOSE, GLC: 124 MG/DL
GLUCOSE, GLC: 127 MG/DL
GLUCOSE, GLC: 128 MG/DL
GLUCOSE, GLC: 132 MG/DL
GLUCOSE, GLC: 134 MG/DL
GLUCOSE, GLC: 138 MG/DL
GLUCOSE, GLC: 140 MG/DL
GLUCOSE, GLC: 140 MG/DL
GLUCOSE, GLC: 141 MG/DL
GLUCOSE, GLC: 142 MG/DL
GLUCOSE, GLC: 159 MG/DL
GLUCOSE, GLC: 159 MG/DL
GLUCOSE, GLC: 176 MG/DL
GLUCOSE, GLC: 184 MG/DL
GLUCOSE, GLC: 187 MG/DL
GLUCOSE, GLC: 189 MG/DL
GLUCOSE, GLC: 191 MG/DL
GLUCOSE, GLC: 202 MG/DL
GLUCOSE, GLC: 216 MG/DL
GLUCOSE, GLC: 293 MG/DL
HCT VFR BLD AUTO: 31.7 % (ref 36.6–50.3)
HGB BLD-MCNC: 11.7 G/DL (ref 12.1–17)
HIGH TARGET, HITG: 250 MG/DL
IMM GRANULOCYTES # BLD: 0 K/UL (ref 0–0.04)
IMM GRANULOCYTES NFR BLD AUTO: 0 % (ref 0–0.5)
INSULIN ADMINSTERED, INSADM: 0 UNITS/HOUR
INSULIN ADMINSTERED, INSADM: 0.1 UNITS/HOUR
INSULIN ADMINSTERED, INSADM: 0.4 UNITS/HOUR
INSULIN ADMINSTERED, INSADM: 1.2 UNITS/HOUR
INSULIN ADMINSTERED, INSADM: 10.9 UNITS/HOUR
INSULIN ADMINSTERED, INSADM: 17.2 UNITS/HOUR
INSULIN ADMINSTERED, INSADM: 2 UNITS/HOUR
INSULIN ADMINSTERED, INSADM: 2.8 UNITS/HOUR
INSULIN ADMINSTERED, INSADM: 25.6 UNITS/HOUR
INSULIN ADMINSTERED, INSADM: 3 UNITS/HOUR
INSULIN ADMINSTERED, INSADM: 3.1 UNITS/HOUR
INSULIN ADMINSTERED, INSADM: 5 UNITS/HOUR
INSULIN ADMINSTERED, INSADM: 6.9 UNITS/HOUR
INSULIN ORDER, INSORD: 0 UNITS/HOUR
INSULIN ORDER, INSORD: 0.1 UNITS/HOUR
INSULIN ORDER, INSORD: 0.4 UNITS/HOUR
INSULIN ORDER, INSORD: 1.2 UNITS/HOUR
INSULIN ORDER, INSORD: 10.9 UNITS/HOUR
INSULIN ORDER, INSORD: 17.2 UNITS/HOUR
INSULIN ORDER, INSORD: 2 UNITS/HOUR
INSULIN ORDER, INSORD: 2.8 UNITS/HOUR
INSULIN ORDER, INSORD: 25.6 UNITS/HOUR
INSULIN ORDER, INSORD: 3 UNITS/HOUR
INSULIN ORDER, INSORD: 3.1 UNITS/HOUR
INSULIN ORDER, INSORD: 5 UNITS/HOUR
INSULIN ORDER, INSORD: 6.9 UNITS/HOUR
LOW TARGET, LOT: 150 MG/DL
LYMPHOCYTES # BLD: 1 K/UL (ref 0.8–3.5)
LYMPHOCYTES NFR BLD: 11 % (ref 12–49)
MAGNESIUM SERPL-MCNC: 2.1 MG/DL (ref 1.6–2.4)
MCH RBC QN AUTO: 32.6 PG (ref 26–34)
MCHC RBC AUTO-ENTMCNC: 36.9 G/DL (ref 30–36.5)
MCV RBC AUTO: 88.3 FL (ref 80–99)
METHADONE UR QL: NEGATIVE
MINUTES UNTIL NEXT BG, NBG: 120 MIN
MINUTES UNTIL NEXT BG, NBG: 60 MIN
MONOCYTES # BLD: 1.8 K/UL (ref 0–1)
MONOCYTES NFR BLD: 19 % (ref 5–13)
MULTIPLIER, MUL: 0
MULTIPLIER, MUL: 0.01
MULTIPLIER, MUL: 0.01
MULTIPLIER, MUL: 0.03
MULTIPLIER, MUL: 0.04
MULTIPLIER, MUL: 0.04
MULTIPLIER, MUL: 0.06
MULTIPLIER, MUL: 0.07
MULTIPLIER, MUL: 0.09
MULTIPLIER, MUL: 0.11
NEUTS SEG # BLD: 6.6 K/UL (ref 1.8–8)
NEUTS SEG NFR BLD: 69 % (ref 32–75)
NRBC # BLD: 0.02 K/UL (ref 0–0.01)
NRBC BLD-RTO: 0.2 PER 100 WBC
OPIATES UR QL: POSITIVE
ORDER INITIALS, ORDINIT: NORMAL
P-R INTERVAL, ECG05: 152 MS
PCP UR QL: NEGATIVE
PHOSPHATE SERPL-MCNC: 2.4 MG/DL (ref 2.6–4.7)
PLATELET # BLD AUTO: 348 K/UL (ref 150–400)
PMV BLD AUTO: 11 FL (ref 8.9–12.9)
POTASSIUM SERPL-SCNC: 3.1 MMOL/L (ref 3.5–5.1)
POTASSIUM SERPL-SCNC: 4 MMOL/L (ref 3.5–5.1)
Q-T INTERVAL, ECG07: 356 MS
QRS DURATION, ECG06: 118 MS
QTC CALCULATION (BEZET), ECG08: 481 MS
RBC # BLD AUTO: 3.59 M/UL (ref 4.1–5.7)
SERVICE CMNT-IMP: ABNORMAL
SODIUM SERPL-SCNC: 149 MMOL/L (ref 136–145)
SODIUM SERPL-SCNC: 151 MMOL/L (ref 136–145)
VENTRICULAR RATE, ECG03: 110 BPM
WBC # BLD AUTO: 9.6 K/UL (ref 4.1–11.1)

## 2018-09-03 PROCEDURE — 74011636637 HC RX REV CODE- 636/637: Performed by: EMERGENCY MEDICINE

## 2018-09-03 PROCEDURE — 82962 GLUCOSE BLOOD TEST: CPT

## 2018-09-03 PROCEDURE — 74011000258 HC RX REV CODE- 258: Performed by: EMERGENCY MEDICINE

## 2018-09-03 PROCEDURE — 80307 DRUG TEST PRSMV CHEM ANLYZR: CPT | Performed by: INTERNAL MEDICINE

## 2018-09-03 PROCEDURE — 74011250636 HC RX REV CODE- 250/636: Performed by: EMERGENCY MEDICINE

## 2018-09-03 PROCEDURE — 74011250637 HC RX REV CODE- 250/637: Performed by: INTERNAL MEDICINE

## 2018-09-03 PROCEDURE — 74011000250 HC RX REV CODE- 250: Performed by: INTERNAL MEDICINE

## 2018-09-03 PROCEDURE — 36415 COLL VENOUS BLD VENIPUNCTURE: CPT | Performed by: INTERNAL MEDICINE

## 2018-09-03 PROCEDURE — 80048 BASIC METABOLIC PNL TOTAL CA: CPT | Performed by: INTERNAL MEDICINE

## 2018-09-03 PROCEDURE — C9113 INJ PANTOPRAZOLE SODIUM, VIA: HCPCS | Performed by: INTERNAL MEDICINE

## 2018-09-03 PROCEDURE — 83735 ASSAY OF MAGNESIUM: CPT | Performed by: INTERNAL MEDICINE

## 2018-09-03 PROCEDURE — 74011250636 HC RX REV CODE- 250/636: Performed by: INTERNAL MEDICINE

## 2018-09-03 PROCEDURE — 85025 COMPLETE CBC W/AUTO DIFF WBC: CPT | Performed by: INTERNAL MEDICINE

## 2018-09-03 PROCEDURE — 84100 ASSAY OF PHOSPHORUS: CPT | Performed by: INTERNAL MEDICINE

## 2018-09-03 PROCEDURE — 74011636637 HC RX REV CODE- 636/637: Performed by: INTERNAL MEDICINE

## 2018-09-03 PROCEDURE — 65610000006 HC RM INTENSIVE CARE

## 2018-09-03 PROCEDURE — 74011000258 HC RX REV CODE- 258: Performed by: INTERNAL MEDICINE

## 2018-09-03 RX ORDER — FAMOTIDINE 10 MG/ML
20 INJECTION INTRAVENOUS EVERY 12 HOURS
Status: DISCONTINUED | OUTPATIENT
Start: 2018-09-03 | End: 2018-09-04

## 2018-09-03 RX ORDER — DEXTROSE MONOHYDRATE AND SODIUM CHLORIDE 5; .9 G/100ML; G/100ML
150 INJECTION, SOLUTION INTRAVENOUS CONTINUOUS
Status: DISCONTINUED | OUTPATIENT
Start: 2018-09-03 | End: 2018-09-03

## 2018-09-03 RX ORDER — POTASSIUM CHLORIDE 7.45 MG/ML
10 INJECTION INTRAVENOUS ONCE
Status: COMPLETED | OUTPATIENT
Start: 2018-09-03 | End: 2018-09-03

## 2018-09-03 RX ORDER — DEXTROSE, SODIUM CHLORIDE, AND POTASSIUM CHLORIDE 5; .45; .15 G/100ML; G/100ML; G/100ML
75 INJECTION INTRAVENOUS CONTINUOUS
Status: DISPENSED | OUTPATIENT
Start: 2018-09-03 | End: 2018-09-04

## 2018-09-03 RX ORDER — INSULIN LISPRO 100 [IU]/ML
INJECTION, SOLUTION INTRAVENOUS; SUBCUTANEOUS
Status: DISCONTINUED | OUTPATIENT
Start: 2018-09-04 | End: 2018-09-05 | Stop reason: HOSPADM

## 2018-09-03 RX ORDER — INSULIN GLARGINE 100 [IU]/ML
46 INJECTION, SOLUTION SUBCUTANEOUS DAILY
Status: DISCONTINUED | OUTPATIENT
Start: 2018-09-04 | End: 2018-09-04

## 2018-09-03 RX ORDER — METOCLOPRAMIDE HYDROCHLORIDE 5 MG/ML
10 INJECTION INTRAMUSCULAR; INTRAVENOUS
Status: COMPLETED | OUTPATIENT
Start: 2018-09-03 | End: 2018-09-03

## 2018-09-03 RX ADMIN — ONDANSETRON 2 MG: 2 INJECTION, SOLUTION INTRAMUSCULAR; INTRAVENOUS at 06:39

## 2018-09-03 RX ADMIN — CALCIUM GLUCONATE 1 G: 94 INJECTION, SOLUTION INTRAVENOUS at 07:59

## 2018-09-03 RX ADMIN — Medication 10 ML: at 13:24

## 2018-09-03 RX ADMIN — SODIUM CHLORIDE 5 UNITS/HR: 900 INJECTION, SOLUTION INTRAVENOUS at 04:41

## 2018-09-03 RX ADMIN — DEXTROSE MONOHYDRATE, SODIUM CHLORIDE, AND POTASSIUM CHLORIDE 75 ML/HR: 50; 4.5; 1.49 INJECTION, SOLUTION INTRAVENOUS at 09:44

## 2018-09-03 RX ADMIN — Medication 10 ML: at 06:00

## 2018-09-03 RX ADMIN — SODIUM CHLORIDE 3.1 UNITS/HR: 900 INJECTION, SOLUTION INTRAVENOUS at 06:41

## 2018-09-03 RX ADMIN — SODIUM CHLORIDE 10.9 UNITS/HR: 900 INJECTION, SOLUTION INTRAVENOUS at 02:17

## 2018-09-03 RX ADMIN — POTASSIUM CHLORIDE 10 MEQ: 10 INJECTION, SOLUTION INTRAVENOUS at 08:20

## 2018-09-03 RX ADMIN — ONDANSETRON 2 MG: 2 INJECTION, SOLUTION INTRAMUSCULAR; INTRAVENOUS at 22:05

## 2018-09-03 RX ADMIN — MUPIROCIN: 20 OINTMENT TOPICAL at 17:06

## 2018-09-03 RX ADMIN — SODIUM CHLORIDE 40 MG: 9 INJECTION, SOLUTION INTRAMUSCULAR; INTRAVENOUS; SUBCUTANEOUS at 08:20

## 2018-09-03 RX ADMIN — DEXTROSE MONOHYDRATE AND SODIUM CHLORIDE 150 ML/HR: 5; .9 INJECTION, SOLUTION INTRAVENOUS at 01:22

## 2018-09-03 RX ADMIN — ONDANSETRON 2 MG: 2 INJECTION, SOLUTION INTRAMUSCULAR; INTRAVENOUS at 15:50

## 2018-09-03 RX ADMIN — ONDANSETRON 2 MG: 2 INJECTION, SOLUTION INTRAMUSCULAR; INTRAVENOUS at 02:00

## 2018-09-03 RX ADMIN — SODIUM CHLORIDE 6.9 UNITS/HR: 900 INJECTION, SOLUTION INTRAVENOUS at 03:46

## 2018-09-03 RX ADMIN — INSULIN GLARGINE 46 UNITS: 100 INJECTION, SOLUTION SUBCUTANEOUS at 08:19

## 2018-09-03 RX ADMIN — MUPIROCIN: 20 OINTMENT TOPICAL at 08:19

## 2018-09-03 RX ADMIN — Medication 10 ML: at 22:06

## 2018-09-03 RX ADMIN — SODIUM CHLORIDE 3 UNITS/HR: 900 INJECTION, SOLUTION INTRAVENOUS at 05:47

## 2018-09-03 RX ADMIN — SODIUM CHLORIDE 2.8 UNITS/HR: 900 INJECTION, SOLUTION INTRAVENOUS at 07:50

## 2018-09-03 RX ADMIN — FAMOTIDINE 20 MG: 10 INJECTION, SOLUTION INTRAVENOUS at 22:05

## 2018-09-03 RX ADMIN — METOCLOPRAMIDE 10 MG: 5 INJECTION, SOLUTION INTRAMUSCULAR; INTRAVENOUS at 09:28

## 2018-09-03 RX ADMIN — SODIUM PHOSPHATE, MONOBASIC, MONOHYDRATE AND SODIUM PHOSPHATE, DIBASIC ANHYDROUS: 276; 142 INJECTION, SOLUTION INTRAVENOUS at 14:43

## 2018-09-03 RX ADMIN — HYDROCODONE BITARTRATE AND ACETAMINOPHEN 1 TABLET: 5; 325 TABLET ORAL at 22:09

## 2018-09-03 NOTE — PROGRESS NOTES
Nutrition Assessment:    RECOMMENDATIONS:   Advance to Diabetic diet as medically able    DIETITIANS INTERVENTIONS/PLAN:   Advance diet as tolerated  Monitor appetite/PO intake    ASSESSMENT:   Pt admitted with DKA. PMH: psych hx, Type 1 DM, medication noncompliance. Chart reviewed, pt sleeping soundly at time of attempted visit. MST triggered for unsure of wt loss. Per EMR it does appear his wt is down over the past few months. Pt is NPO currently has he had n/v overnight. He remains on an insulin drip. Noted per H&P pt does not have insurance so he doesn't  his prescriptions. He has been educated by DTC before (last time was in June 2018, during last admission). K+ 3.1, being repleted. Given wt loss, will plan to add PO supplements as diet advances. Will also monitor need for more education upon F/U.     SUBJECTIVE/OBJECTIVE:   Pt sleeping   Diet Order: NPO  % Eaten:  No data found. Pertinent Medications:protonix, reglan, KCl; IVF(D5, [email or web address]); Drips: insulin. Chemistries:  Lab Results   Component Value Date/Time    Sodium 149 (H) 09/03/2018 09:29 AM    Potassium 4.0 09/03/2018 09:29 AM    Chloride 116 (H) 09/03/2018 09:29 AM    CO2 25 09/03/2018 09:29 AM    Anion gap 8 09/03/2018 09:29 AM    Glucose 143 (H) 09/03/2018 09:29 AM    BUN 39 (H) 09/03/2018 09:29 AM    Creatinine 1.07 09/03/2018 09:29 AM    BUN/Creatinine ratio 36 (H) 09/03/2018 09:29 AM    GFR est AA >60 09/03/2018 09:29 AM    GFR est non-AA >60 09/03/2018 09:29 AM    Calcium 8.2 (L) 09/03/2018 09:29 AM    AST (SGOT) 22 09/02/2018 12:33 PM    Alk.  phosphatase 192 (H) 09/02/2018 12:33 PM    Protein, total 7.7 09/02/2018 12:33 PM    Albumin 3.9 09/02/2018 12:33 PM    Globulin 3.8 09/02/2018 12:33 PM    A-G Ratio 1.0 (L) 09/02/2018 12:33 PM    ALT (SGPT) 64 09/02/2018 12:33 PM      Anthropometrics: Height: 5' 9\" (175.3 cm) Weight: 59.1 kg (130 lb 4.7 oz)  [x]bed scale (9/2)   []stated   []unknown    IBW (%IBW):   ( ) UBW (%UBW):   (  %)    BMI: Body mass index is 19.24 kg/(m^2). This BMI is indicative of:  []Underweight   [x]Normal(low end)    []Overweight   [] Obesity   [] Extreme Obesity (BMI>40)  Estimated Nutrition Needs (Based on): 1972 Kcals/day (MSJ 1517 x 1.3) , 59 g (-71 (1-1.2gPro/kg)) Protein  Carbohydrate: At Least 130 g/day  Fluids: 2000 mL/day    Last BM: 9/2-diarrhea   []Active     [x]Hyperactive  []Hypoactive       [] Absent   BS  Skin:    [x] Intact   [] Incision  [] Breakdown   [] DTI   [] Tears/Excoriation/Abrasion  []Edema [] Other: Wt Readings from Last 30 Encounters:   09/02/18 59.1 kg (130 lb 4.7 oz)   06/08/18 61.4 kg (135 lb 5.8 oz)   03/11/18 63.8 kg (140 lb 10.5 oz)   03/09/18 66.7 kg (147 lb 0.8 oz)   02/23/18 63.8 kg (140 lb 10.5 oz)   02/04/18 65.4 kg (144 lb 2.9 oz)   08/23/17 61 kg (134 lb 8 oz)   03/07/17 60.8 kg (134 lb)   09/27/16 60.6 kg (133 lb 9.6 oz)   09/17/16 63.6 kg (140 lb 3.4 oz)   05/04/16 65 kg (143 lb 4.8 oz)   02/12/16 63.5 kg (140 lb)   11/29/15 63.1 kg (139 lb 3.2 oz)   07/07/15 62.1 kg (136 lb 14.5 oz)   03/18/15 62.1 kg (136 lb 14.5 oz)   10/09/14 59.3 kg (130 lb 11.7 oz)   10/01/14 61.1 kg (134 lb 11.2 oz)   07/24/14 57.9 kg (127 lb 10.3 oz)   05/06/14 55.3 kg (122 lb)   04/29/14 54.4 kg (120 lb)   04/27/14 58.3 kg (128 lb 8.5 oz)   04/24/14 57.1 kg (125 lb 12.8 oz)   04/16/14 58.7 kg (129 lb 6.4 oz)   04/08/14 54 kg (119 lb 0.8 oz)   02/02/14 53.1 kg (117 lb)   01/27/14 51.8 kg (114 lb 3.2 oz)   01/26/14 61.2 kg (135 lb)   01/19/14 57.1 kg (125 lb 14.1 oz)   10/24/13 56 kg (123 lb 7.3 oz)   10/20/13 58 kg (127 lb 13.9 oz)      NUTRITION DIAGNOSES:   Problem:  Inadequate protein-energy intake      Etiology: related to pt NPO 2' DKA     Signs/Symptoms: as evidenced by NPO meets 0% kcal and protein needs. NUTRITION INTERVENTIONS:  Meals/Snacks: Other (advance to Diabetic diet as medically able)                  GOAL:   Pt will advance to solid diet in 2-4 days. Cultural, Shinto, or Ethnic Dietary Needs: None     LEARNING NEEDS (Diet, Food/Nutrient-Drug Interaction):    [] None Identified   [] Identified and Education Provided/Documented   [x] Identified and Pt declined/was not appropriate      [x] Interdisciplinary Care Plan Reviewed/Documented    [x] Participated in Discharge Planning: Diabetic diet    [] Interdisciplinary Rounds     NUTRITION RISK:    [x] High              [] Moderate           []  Low  []  Minimal/Uncompromised    PT SEEN FOR:    []  MD Consult: []Calorie Count      []Diabetic Diet Education        []Diet Education     []Electrolyte Management     []General Nutrition Management and Supplements     []Management of Tube Feeding     []TPN Recommendations    [x]  RN Referral:  [x]MST score >=2     []Enteral/Parenteral Nutrition PTA     []Pregnant: Gestational DM or Multigestation   []  Low BMI  []  Re-Screen   []  LOS   []  NPO/clears x 5 days   []  New TF/TPN    Nory Penaloza RD, [address] [place]    Pager [phone]  Weekend Pager [phone]

## 2018-09-03 NOTE — PROGRESS NOTES
0710  Beside report received from [facility] (out going) to self Isa Brooks RN on coming). Discussed related medical and  Environmental issues involved in caring for this patient. Events overnight and treatments discussed. POC for today discussed. ClucoseStabilizer: Insulin gtt at 3.1 un/hr. Next BG at 0745. Z1XV@ 150ml/hr  A/Ox4, Lungs CTA, ABD flat (last BM stated 2 days ago),Beard placed overnight for restention of urine 1800ml's post void. Pulses palpable throughout. Pt is defiant, resfused to sit up while drinking melted ice chips, then strangled and began yelling \"I need a bag!\" (per report he did vomit several times overnight and was given zofran.) After coughing for several minutes he then stats, \"I need some water. \" Cup taken away and swabs at bedside. Attempted to rationalize NPO status and pt just cover his head with the covers. Afebrile, VS stable, tachy 116.      0800  New orders received. Basal insulin to start at 0900, Labs for MET, Mag, Phos also at 0900. Calcium Gluconate started (just arrived from pharmacy)  Ordered IVF bag from pharmacy, decreased current IVF to 75ml/hr until new bag obtained. 0830  Dr. Rebeka Diana in to see. Discussed pt's unwillingness to cooperate with ordered treatment. Pt agrees to assist in his care after talking with Dr. Rebeka Diana. Lantus given as prescribed. Pt continues to c/o nausea and is coughing, but no emesis thus far this shift. Zofran discussed with Dr. Rebeka Diana, plan is to try something different, possibly Compazine, waiting for orders. Francisco J Ibarra 1460 called to continue Insulin gtt x1 day (Gap closed x1). Dr. Rebeka Daina called to claify orders. Insulin gtt to be continued at this time per Glucose Stabilizer program.    0930  New orders seen: NPO x ice chips. Reglan IV given. Con't Insulin gtt. K+ and Calcium infusions completing, Labs sent. 1100  Girlfriend in to visit. Discussed pt's medical compliance with illness at home.  Due to inability to obtain prescriptions r/t cost, he has been using his mother's insulin whenever the BG meter reads \"HIGH\". She states that he is not honest about his illness or medications and just tells her \"it's fine. \"  GF states they both were drug abusers at one time, he (the pt) was using IV Heroin, but no longer uses any drugs. She voiced wanting to help him get on disability, due to his diabetes, so they can have money to get a place to live on their own. Currently living with a friend and has lost custody of her 3 daughters and is afraid they may lose custody of their child together, which is 3 months old. 1530  Vomiting clear liquid after eating ice chips. Zofran 2mg IV given. 1730  Able to keep clear liquids down after last Zofran given. 1910  Bedside and Verbal shift change report given to Francisco J Estrella 1485 nurse) by Santhosh Guerrero RN (offgoing nurse). Report included the following information SBAR, Kardex, ED Summary, OR Summary, Procedure Summary, Intake/Output, MAR, Accordion, Recent Results, Med Rec Status, Cardiac Rhythm NSR, Alarm Parameters , Pre Procedure Checklist, Procedure Verification and Quality Measures.

## 2018-09-03 NOTE — PROGRESS NOTES
9/3/2018    INTENSIVIST PROGRESS NOTE:     Patient seen and evaluated, chart reviewed   31 yo male admitted with DKA  Started on ivf, insulin drip  Now pt in CCU in no distress  No acute complaints    ROS: no sob, no cp    Visit Vitals    /66    Pulse (!) 104    Temp 98.9 °F (37.2 °C)    Resp 23    Ht 5' 9\" (1.753 m)    Wt 59.1 kg (130 lb 4.7 oz)    SpO2 100%    BMI 19.24 kg/m2       General: no distress  Eyes: anicteric  HEENT: dry oral mucosa  Neck: FROM  CV: RRR  Lungs: clear  Abd: soft  : no flank pain  Ext: no edema  Skin: no rash  Musculoskeletal: normal inspection  Neuro: non focal    CXR: none done    Labs reviewed    A/P:  - DKA: insulin drip  - FELECIA: resolving with IVF  - hyperkalemia: resolved   - IV protonix  - advance diet as tolerated  - Will assist on disposition planning when stable for transfer  Vishal Starkey MD

## 2018-09-03 NOTE — PROGRESS NOTES
Hospitalist Progress Note    NAME: Sherwin Saucedo   :  1980   MRN:  [id number]       Assessment / Plan:  SIRS due to DKA (resolved) in setting of DM1 uncontrolled: recent admission for DKA in  as well, felt to be noncompliant with therapy. Pt remains nauseated and is dry heaving today. - leukocytosis resolved today  - anion gap closed x2 but will con't insulin gtt and IV fluid support today due to severe nausea. Orders to transition off insulin gtt written for tomorrow. - start lantus and sliding scale tomorrow  - HgA1c ordered  - appreciate DTC assistance  - CM consulted for assistance, has no insurance which seems to be a barrier. This is not a case of intentional non-compliance but rather financial limitation leading to poor compliance. Hypophosphatemia:  - replacing  - recheck K, mag, phos in AM   Reported hematemesis in setting of multiple episodes of emesis, resolved  - con't IV pepcid today  Hyperkalemia, resolved  FELECIA, resolved   Tobacco use disorder: smokes 1ppd  -nicotine patch     Code status: Full  Prophylaxis: Hep SQ starting tomorrow if no additional hematemesis     Subjective:     Chief Complaint / Reason for Physician Visit  Nauseated, dry heaving. Complains of feeling thirsty. Discussed with RN events overnight. Review of Systems:  Symptom Y/N Comments  Symptom Y/N Comments   Fever/Chills n   Chest Pain n    Poor Appetite n   Edema n    Cough n   Abdominal Pain n    Sputum n   Joint Pain     SOB/JOHNSTON n   Pruritis/Rash     Nausea/vomit y   Tolerating PT/OT     Diarrhea    Tolerating Diet     Constipation    Other       Could NOT obtain due to:      Objective:     VITALS:   Last 24hrs VS reviewed since prior progress note.  Most recent are:  Patient Vitals for the past 24 hrs:   Temp Pulse Resp BP SpO2   18 0500 - (!) 104 23 121/66 -   18 0400 98.9 °F (37.2 °C) (!) 109 18 122/67 -   18 0300 - (!) 114 15 105/59 100 %   18 0200 - (!) 114 17 127/60 - 09/03/18 0100 - (!) 116 20 115/65 -   09/03/18 0000 97.9 °F (36.6 °C) (!) 118 15 133/71 99 %   09/02/18 2300 - (!) 116 22 124/56 -   09/02/18 2200 - (!) 118 16 122/62 -   09/02/18 2100 - (!) 124 20 106/72 100 %   09/02/18 2000 98 °F (36.7 °C) (!) 125 22 119/65 100 %   09/02/18 1900 - (!) 122 16 106/55 97 %   09/02/18 1804 - (!) 115 20 116/67 100 %   09/02/18 1800 - (!) 115 18 90/50 98 %   09/02/18 1730 - (!) 110 16 96/62 100 %   09/02/18 1719 - (!) 111 18 105/60 99 %   09/02/18 1714 97.7 °F (36.5 °C) (!) 112 23 102/59 100 %   09/02/18 1600 97.8 °F (36.6 °C) (!) 108 25 93/56 100 %   09/02/18 1530 - (!) 110 24 94/59 100 %   09/02/18 1400 - (!) 114 (!) 37 91/47 100 %   09/02/18 1330 - (!) 112 24 97/52 100 %   09/02/18 1240 - (!) 110 (!) 34 117/47 100 %   09/02/18 1149 - (!) 106 18 114/62 100 %       Intake/Output Summary (Last 24 hours) at 09/03/18 0834  Last data filed at 09/03/18 0400   Gross per 24 hour   Intake                0 ml   Output             3150 ml   Net            -3150 ml        PHYSICAL EXAM:  General: WD, WN. Alert, cooperative, no acute distress    EENT:  EOMI. Anicteric sclerae. MMM  Resp:  CTA bilaterally, no wheezing or rales. No accessory muscle use  CV:  Regular rhythm,  No edema  GI:  Soft, Non distended, Non tender.  +Bowel sounds  Neurologic:  Alert and oriented X 3, normal speech,   Psych:   Some insight. Not anxious nor agitated  Skin:  No rashes.   No jaundice    Reviewed most current lab test results and cultures  YES  Reviewed most current radiology test results   YES  Review and summation of old records today    NO  Reviewed patient's current orders and MAR    YES  PMH/SH reviewed - no change compared to H&P  ________________________________________________________________________  Care Plan discussed with:    Comments   Patient x    Family      RN x    Care Manager     Consultant                        Multidiciplinary team rounds were held today with , nursing, pharmacist and clinical coordinator. Patient's plan of care was discussed; medications were reviewed and discharge planning was addressed. ________________________________________________________________________  Total NON critical care TIME:  35 Minutes    Total CRITICAL CARE TIME Spent:  Minutes non procedure based      Comments   >50% of visit spent in counseling and coordination of care x    ________________________________________________________________________  Josué Ewing MD     Procedures: see electronic medical records for all procedures/Xrays and details which were not copied into this note but were reviewed prior to creation of Plan. LABS:  I reviewed today's most current labs and imaging studies.   Pertinent labs include:  Recent Labs      09/03/18   0310  09/02/18   1233   WBC  9.6  24.9*   HGB  11.7*  15.2   HCT  31.7*  47.4   PLT  348  389     Recent Labs      09/03/18   0531  09/02/18   2109  09/02/18   1815  09/02/18   1233   NA  151*  144  134*  122*   K  3.1*  3.2*  4.0  6.3*   CL  125*  115*  99  83*   CO2  18*  10*  8*  <5*   GLU  339*  366*  651*  1053*   BUN  33*  45*  54*  54*   CREA  0.89  1.59*  2.25*  2.23*   CA  6.2*  7.1*  8.8  9.5   MG   --    --    --   2.9*   PHOS   --    --    --   9.4*   ALB   --    --    --   3.9   TBILI   --    --    --   0.5   SGOT   --    --    --   22   ALT   --    --    --   64       Signed: Josué Ewing MD

## 2018-09-03 NOTE — DIABETES MGMT
DTC Progress Note    Recommendations/ Comments: Pt currently on insulin gtt running at 0 units/hr. D5 currently running at 75 ml/hr. Anion gap has been <12 on two consecutive BMPs and BG is less than 200 mg/dl . If appropriate, please consider transitioning per hospital guidelines. Please ensure pt receives basal insulin 2 hours prior to discontinuing insulin gtt. Consider transitioning pt on home regimen of Lantus 46 units daily , meal-time insulin, and lispro correction Ac & hs. DTC will f/u with pt at later time. Current hospital DM medication: currently on insulin gtt     Chart reviewed on [facility]. Patient is a 30 y.o. male with  Known DM on Lantus 46 units daily and Novolin R 2-10 units Sliding scale 3-4x/daily at home. A1c:   Lab Results   Component Value Date/Time    Hemoglobin A1c 12.6 (H) 09/02/2018 12:33 PM    Hemoglobin A1c 12.9 (H) 03/12/2018 03:33 AM       Recent Glucose Results:   Lab Results   Component Value Date/Time     (H) 09/03/2018 09:29 AM     (H) 09/03/2018 05:31 AM     (H) 09/02/2018 09:09 PM    GLUCPOC 127 (H) 09/03/2018 02:14 PM    GLUCPOC 124 (H) 09/03/2018 01:16 PM    GLUCPOC 116 (H) 09/03/2018 11:44 AM        Lab Results   Component Value Date/Time    Creatinine 1.07 09/03/2018 09:29 AM     Estimated Creatinine Clearance: 80.5 mL/min (based on Cr of 1.07). Active Orders   Diet    DIET DIABETIC CLEAR LIQUID        PO intake: No data found. Will continue to follow as needed.     Thank you    Fani Borjas, [address]    Office: [phone]

## 2018-09-03 NOTE — PROGRESS NOTES
Critical lab result called by Flavio Galvez in lab at 0620  Ca+ 6.2  Read back and verified    Hospitalist paged at [facility]    Results given to Dr Wilmar Metzger  Telephone orders received, read back and verified:  calcium gluconate 1 gram IV one time dose

## 2018-09-03 NOTE — PROGRESS NOTES
Post-void bladder scan showed 1800ml of urine in bladder  Per orders to anchor hansen if over 200ml, hanesn catheter was placed, balloon inflated c 10ml sterile water, sterile technique used for hansen placement, patient tolerated procedure well  1500ml clear, yellow/straw urine collected into drainage back upon insertion

## 2018-09-04 DIAGNOSIS — E11.9 DIABETES MELLITUS WITHOUT COMPLICATION (HCC): Primary | ICD-10-CM

## 2018-09-04 LAB
ADMINISTERED INITIALS, ADMINIT: NORMAL
ANION GAP SERPL CALC-SCNC: 8 MMOL/L (ref 5–15)
BUN SERPL-MCNC: 19 MG/DL (ref 6–20)
BUN/CREAT SERPL: 25 (ref 12–20)
CALCIUM SERPL-MCNC: 7.5 MG/DL (ref 8.5–10.1)
CHLORIDE SERPL-SCNC: 110 MMOL/L (ref 97–108)
CO2 SERPL-SCNC: 25 MMOL/L (ref 21–32)
CREAT SERPL-MCNC: 0.75 MG/DL (ref 0.7–1.3)
D50 ADMINISTERED, D50ADM: 0 ML
D50 ORDER, D50ORD: 0 ML
ERYTHROCYTE [DISTWIDTH] IN BLOOD BY AUTOMATED COUNT: 13.8 % (ref 11.5–14.5)
EST. AVERAGE GLUCOSE BLD GHB EST-MCNC: 341 MG/DL
GLSCOM COMMENTS: NORMAL
GLUCOSE BLD STRIP.AUTO-MCNC: 123 MG/DL (ref 65–100)
GLUCOSE BLD STRIP.AUTO-MCNC: 177 MG/DL (ref 65–100)
GLUCOSE BLD STRIP.AUTO-MCNC: 215 MG/DL (ref 65–100)
GLUCOSE BLD STRIP.AUTO-MCNC: 218 MG/DL (ref 65–100)
GLUCOSE BLD STRIP.AUTO-MCNC: 224 MG/DL (ref 65–100)
GLUCOSE BLD STRIP.AUTO-MCNC: 228 MG/DL (ref 65–100)
GLUCOSE BLD STRIP.AUTO-MCNC: 249 MG/DL (ref 65–100)
GLUCOSE BLD STRIP.AUTO-MCNC: 266 MG/DL (ref 65–100)
GLUCOSE BLD STRIP.AUTO-MCNC: 95 MG/DL (ref 65–100)
GLUCOSE SERPL-MCNC: 254 MG/DL (ref 65–100)
GLUCOSE, GLC: 205 MG/DL
GLUCOSE, GLC: 218 MG/DL
GLUCOSE, GLC: 224 MG/DL
GLUCOSE, GLC: 228 MG/DL
GLUCOSE, GLC: 246 MG/DL
GLUCOSE, GLC: 249 MG/DL
HBA1C MFR BLD: 13.5 % (ref 4.2–6.3)
HCT VFR BLD AUTO: 26.7 % (ref 36.6–50.3)
HGB BLD-MCNC: 9.3 G/DL (ref 12.1–17)
HIGH TARGET, HITG: 250 MG/DL
INSULIN ADMINSTERED, INSADM: 0.1 UNITS/HOUR
INSULIN ORDER, INSORD: 0.1 UNITS/HOUR
LOW TARGET, LOT: 150 MG/DL
MAGNESIUM SERPL-MCNC: 2.1 MG/DL (ref 1.6–2.4)
MCH RBC QN AUTO: 32.4 PG (ref 26–34)
MCHC RBC AUTO-ENTMCNC: 34.8 G/DL (ref 30–36.5)
MCV RBC AUTO: 93 FL (ref 80–99)
MINUTES UNTIL NEXT BG, NBG: 120 MIN
MULTIPLIER, MUL: 0
NRBC # BLD: 0 K/UL (ref 0–0.01)
NRBC BLD-RTO: 0 PER 100 WBC
ORDER INITIALS, ORDINIT: NORMAL
PHOSPHATE SERPL-MCNC: 1.8 MG/DL (ref 2.6–4.7)
PLATELET # BLD AUTO: 175 K/UL (ref 150–400)
PMV BLD AUTO: 10.9 FL (ref 8.9–12.9)
POTASSIUM SERPL-SCNC: 3.7 MMOL/L (ref 3.5–5.1)
RBC # BLD AUTO: 2.87 M/UL (ref 4.1–5.7)
SERVICE CMNT-IMP: ABNORMAL
SERVICE CMNT-IMP: NORMAL
SODIUM SERPL-SCNC: 143 MMOL/L (ref 136–145)
WBC # BLD AUTO: 6.3 K/UL (ref 4.1–11.1)

## 2018-09-04 PROCEDURE — 83036 HEMOGLOBIN GLYCOSYLATED A1C: CPT | Performed by: INTERNAL MEDICINE

## 2018-09-04 PROCEDURE — 74011250637 HC RX REV CODE- 250/637: Performed by: INTERNAL MEDICINE

## 2018-09-04 PROCEDURE — 85027 COMPLETE CBC AUTOMATED: CPT | Performed by: INTERNAL MEDICINE

## 2018-09-04 PROCEDURE — 74011636637 HC RX REV CODE- 636/637: Performed by: INTERNAL MEDICINE

## 2018-09-04 PROCEDURE — 36415 COLL VENOUS BLD VENIPUNCTURE: CPT | Performed by: INTERNAL MEDICINE

## 2018-09-04 PROCEDURE — 82962 GLUCOSE BLOOD TEST: CPT

## 2018-09-04 PROCEDURE — 74011000250 HC RX REV CODE- 250: Performed by: INTERNAL MEDICINE

## 2018-09-04 PROCEDURE — 65270000029 HC RM PRIVATE

## 2018-09-04 PROCEDURE — 80048 BASIC METABOLIC PNL TOTAL CA: CPT | Performed by: INTERNAL MEDICINE

## 2018-09-04 PROCEDURE — 74011250636 HC RX REV CODE- 250/636: Performed by: INTERNAL MEDICINE

## 2018-09-04 PROCEDURE — 83735 ASSAY OF MAGNESIUM: CPT | Performed by: INTERNAL MEDICINE

## 2018-09-04 PROCEDURE — 84100 ASSAY OF PHOSPHORUS: CPT | Performed by: INTERNAL MEDICINE

## 2018-09-04 RX ORDER — PANTOPRAZOLE SODIUM 40 MG/1
40 TABLET, DELAYED RELEASE ORAL
Status: DISCONTINUED | OUTPATIENT
Start: 2018-09-05 | End: 2018-09-05 | Stop reason: HOSPADM

## 2018-09-04 RX ORDER — ONDANSETRON 2 MG/ML
4 INJECTION INTRAMUSCULAR; INTRAVENOUS
Status: DISCONTINUED | OUTPATIENT
Start: 2018-09-04 | End: 2018-09-05 | Stop reason: HOSPADM

## 2018-09-04 RX ORDER — INSULIN LISPRO 100 [IU]/ML
5 INJECTION, SOLUTION INTRAVENOUS; SUBCUTANEOUS
Status: DISCONTINUED | OUTPATIENT
Start: 2018-09-04 | End: 2018-09-05 | Stop reason: HOSPADM

## 2018-09-04 RX ORDER — GABAPENTIN 100 MG/1
200 CAPSULE ORAL
Status: DISCONTINUED | OUTPATIENT
Start: 2018-09-04 | End: 2018-09-05 | Stop reason: HOSPADM

## 2018-09-04 RX ORDER — INSULIN GLARGINE 100 [IU]/ML
35 INJECTION, SOLUTION SUBCUTANEOUS DAILY
Status: DISCONTINUED | OUTPATIENT
Start: 2018-09-04 | End: 2018-09-05 | Stop reason: HOSPADM

## 2018-09-04 RX ADMIN — INSULIN LISPRO 3 UNITS: 100 INJECTION, SOLUTION INTRAVENOUS; SUBCUTANEOUS at 07:59

## 2018-09-04 RX ADMIN — Medication 10 ML: at 21:40

## 2018-09-04 RX ADMIN — INSULIN GLARGINE 35 UNITS: 100 INJECTION, SOLUTION SUBCUTANEOUS at 10:42

## 2018-09-04 RX ADMIN — HYDROCODONE BITARTRATE AND ACETAMINOPHEN 1 TABLET: 5; 325 TABLET ORAL at 15:48

## 2018-09-04 RX ADMIN — ONDANSETRON 4 MG: 2 INJECTION, SOLUTION INTRAMUSCULAR; INTRAVENOUS at 15:48

## 2018-09-04 RX ADMIN — FAMOTIDINE 20 MG: 10 INJECTION, SOLUTION INTRAVENOUS at 08:08

## 2018-09-04 RX ADMIN — Medication 10 ML: at 14:00

## 2018-09-04 RX ADMIN — INSULIN LISPRO 5 UNITS: 100 INJECTION, SOLUTION INTRAVENOUS; SUBCUTANEOUS at 12:12

## 2018-09-04 RX ADMIN — INSULIN LISPRO 2 UNITS: 100 INJECTION, SOLUTION INTRAVENOUS; SUBCUTANEOUS at 12:11

## 2018-09-04 RX ADMIN — MUPIROCIN: 20 OINTMENT TOPICAL at 08:05

## 2018-09-04 RX ADMIN — HYDROCODONE BITARTRATE AND ACETAMINOPHEN 1 TABLET: 5; 325 TABLET ORAL at 08:00

## 2018-09-04 RX ADMIN — GABAPENTIN 200 MG: 100 CAPSULE ORAL at 21:39

## 2018-09-04 RX ADMIN — Medication 10 ML: at 06:49

## 2018-09-04 RX ADMIN — ONDANSETRON 4 MG: 2 INJECTION, SOLUTION INTRAMUSCULAR; INTRAVENOUS at 08:03

## 2018-09-04 NOTE — PROGRESS NOTES
Interdisciplinary team rounds were held 9/4/2018 with the following team members:Care Management, Diabetes Treatment Specialist, Nursing, Nutrition, Pastoral Care, Pharmacy, Physical Therapy, Physician and Clinical Coordinator. Plan of care discussed. Goal: Adjust medications, continue to monitor and support. See MD orders and progress notes for further  interventions and desired outcomes.

## 2018-09-04 NOTE — PROGRESS NOTES
Attended Critical Care Unit Interdisciplinary Rounds where patient care plan was discussed.   NAKITA Gómez, [facility], [address] [place] [address] Road Paging Service  [phone]

## 2018-09-04 NOTE — ROUTINE PROCESS
Bedside and Verbal shift change report received from 1340 Micheal Mendoza (offgoing nurse). Report included the following information SBAR, Kardex, ED Summary, Procedure Summary, Intake/Output, MAR, Accordion, Recent Results, Med Rec Status, Cardiac Rhythm NSR, Alarm Parameters , Procedure Verification and Quality Measures. 0745: at the bedside. Nausea persist with vomiting  0800:He has c/o leg and feet pain, medicated with Norco, and repositioned in bed.   0805:Also, with c/o nausea and vomiting, Zofran administered. 0900:Noted resting at long intervals with his eyes closed. No further nausea, vomiting or foot discomfort. 1100: Insulin drip discontinued, planning to transfer to the medical unit today  1230: His significant other is at the bedside. Full bath completed, no skin breakdown noted. 1430: at the bedside. 1500:Report called to Tayla Busch; he will be transferred to room [phone].

## 2018-09-04 NOTE — PROGRESS NOTES
TRANSFER - OUT REPORT:    Verbal report given to MICHELLE Canela RN(name) on Kaela Adler  being transferred to the medical(unit) for routine progression of care     Report consisted of patients Situation, Background, Assessment and   Recommendations(SBAR). Information from the following report(s) SBAR, Kardex, ED Summary, Procedure Summary, Intake/Output, MAR, Accordion, Recent Results, Med Rec Status, Cardiac Rhythm NSR, Alarm Parameters  and Procedure Verification was reviewed with the receiving nurse. Opportunity for questions and clarification was provided. Patient transported with:   Patient-specific medications from Pharmacy  Registered Nurse  1510:Transported via wheelchair into the care of MICHELLE Givens. His gait remains slow and steady.

## 2018-09-04 NOTE — PROGRESS NOTES
Problem: Falls - Risk of  Goal: *Absence of Falls  Document Shannon Fall Risk and appropriate interventions in the flowsheet.    Outcome: Progressing Towards Goal  Fall Risk Interventions:  Mobility Interventions: Bed/chair exit alarm    Mentation Interventions: Door open when patient unattended    Medication Interventions: Bed/chair exit alarm, Evaluate medications/consider consulting pharmacy, Patient to call before getting OOB    Elimination Interventions: Call light in reach, Bed/chair exit alarm, Patient to call for help with toileting needs             Comments: ongoing

## 2018-09-04 NOTE — DIABETES MGMT
DTC Progress Note    Recommendations/ Comments: Pt discussed with rounding team and Dr. Emily Yeung. Plan to decrease lantus to 35 units daily for transition off insulin gtt. Pt had am hypoglycemia during his last admission on his home dose and pt currently has D5 IVF infusing. D5 IVF to be stopped when insulin gtt stopped. Plan to adv diet as alex and add humalog 5 units ac tid for prandial insulin needs. DTC will f/u with pt regarding DKA and elevated a1c. Chart reviewed on [facility] during Multidisciplinary Rounds. A1c:   Lab Results   Component Value Date/Time    Hemoglobin A1c 13.5 (H) 09/04/2018 05:45 AM           Recent Glucose Results: Lab Results   Component Value Date/Time     (H) 09/04/2018 04:29 AM    GLUCPOC 224 (H) 09/04/2018 09:53 AM    GLUCPOC 228 (H) 09/04/2018 07:50 AM    GLUCPOC 266 (H) 09/04/2018 05:44 AM        Lab Results   Component Value Date/Time    Creatinine 0.75 09/04/2018 04:29 AM     Estimated Creatinine Clearance: 114.9 mL/min (based on Cr of 0.75). Active Orders   Diet    DIET DIABETIC CLEAR LIQUID        PO intake: Patient Vitals for the past 72 hrs:   % Diet Eaten   09/03/18 1753 50 %       Will continue to follow as needed. Thank you.   ELIZABETH NickN, RN, [address]

## 2018-09-04 NOTE — DIABETES MGMT
[facility]    Elevated A1C Visit Note    Recommendations/ Comments: Please see note from this am regarding insulin changes made during CCU rounds. Would consider changing correctional insulin to high sensitivity secondary to pt insulin sensitive and pt weight. Pt may require further decrease to lantus if fasting BG is low in the am.    Current DM medications: lantus 35 units daily, humalog 5 units ac tid plus correction    Patient is a 30 y.o. male with known Type 1 Diabetes on lantus 46 units daily and regular SSI 2 units: 100mg/dL > 200mg/dL starting with 6 units at home. Pt denies missing doses of insulin and denies difficultly obtaining his insulin. He states he previously was having trouble obtaining his insulin and sharing insulin with his mother. He is obtaining his insulin from [facility]. After further discussion about his insulin and his a1c rising, pt reports he is not taking his regular insulin. He is not checking his BG and is therefore not taking his regular insulin. Pt is familiar with taking prandial and correctional insulin per him after discussing that he likely needs both to control his BG and bring his a1c down. He does not have a MD to manage his DM- he has obtained new prescriptions only from hospital visits. On previous visit he stated he was depressed. He denies this at this time other than stating that he is tired of having DM, but knows he has to deal with it. Enc pt to begin taking prandial/correctional insulin as prescribed. Pt willing to schedule appt in outpt DTC and is scheduled for  at 11am.  Pt reports he has a  dtr and he knows \"he needs to better manage his DM to be here for her and I'm tired of coming here\".     Previous admissions: 18, 3/11/18    A1c:   Lab Results   Component Value Date/Time    Hemoglobin A1c 13.5 (H) 2018 05:45 AM    Hemoglobin A1c 12.6 (H) 2018 12:33 PM       Recent Glucose Results:   Lab Results Component Value Date/Time     (H) 09/04/2018 04:29 AM    GLUCPOC 177 (H) 09/04/2018 12:11 PM    GLUCPOC 224 (H) 09/04/2018 09:53 AM    GLUCPOC 228 (H) 09/04/2018 07:50 AM        Lab Results   Component Value Date/Time    Creatinine 0.75 09/04/2018 04:29 AM       Active Orders   Diet    DIET FULL LIQUID          Assessed and instructed patient on the following:   ·  interpretation of lab results, blood sugar goals, expiration of insulin, timing/actions of insulin and need for prandial insulin and referred to Diabetes Educator    Provided patient with the following: [x]          Diabetes Self-Care Guide               [x]          Insulin education materials               []          Diabetes survival skills handout               []          BG guidelines for post-op patients               []          \"Decreasing  the Cost of Diabetes Care\"                           [x]          Outpatient DTC contact number          Patient to follow up with PCP of his choosing after discharge. Will continue to follow as needed. Thank you.   BRET Alvarado, RN, [address]

## 2018-09-04 NOTE — PROGRESS NOTES
Problem: Pressure Injury - Risk of  Goal: *Prevention of pressure injury  Document Corey Scale and appropriate interventions in the flowsheet.    Outcome: Progressing Towards Goal  Pressure Injury Interventions:  Sensory Interventions: Assess changes in LOC, Assess need for specialty bed, Check visual cues for pain, Keep linens dry and wrinkle-free, Pressure redistribution bed/mattress (bed type)    Moisture Interventions: Absorbent underpads, Assess need for specialty bed, Maintain skin hydration (lotion/cream), Internal/External urinary devices    Activity Interventions: Assess need for specialty bed, Pressure redistribution bed/mattress(bed type)    Mobility Interventions: Assess need for specialty bed, HOB 30 degrees or less, Pressure redistribution bed/mattress (bed type)    Nutrition Interventions: Document food/fluid/supplement intake

## 2018-09-04 NOTE — PROGRESS NOTES
Hospitalist Progress Note    NAME: Tiffany Obregon   :  1980   MRN:  [id number]       Assessment / Plan:  SIRS due to DKA (resolved) in setting of DM1 uncontrolled: recent admission for DKA in  as well, felt to be noncompliant with therapy. Pt remains nauseated and is dry heaving today. - leukocytosis resolved today  - anion gap closed x2 but will con't insulin gtt and IV fluid support today due to severe nausea. Orders to transition off insulin gtt written for tomorrow. - start lantus and sliding scale tomorrow  - HgA1c ordered  - appreciate DTC assistance  - CM consulted for assistance, has no insurance which seems to be a barrier. This is not a case of intentional non-compliance but rather financial limitation leading to poor compliance. He also does not follow his BS     Neuropathy  Neurontin 200 mg HS    Hypophosphatemia:  - replacing  - recheck K, mag, phos in AM   Reported hematemesis in setting of multiple episodes of emesis, resolved  DKA vs Gastroparesis?  - con't IV pepcid today change to protonix  Hyperkalemia, resolved  FELECIA, resolved   Tobacco use disorder: smokes 1ppd  -nicotine patch     Code status: Full  Prophylaxis: Hep SQ starting tomorrow if no additional hematemesis     Subjective:     Chief Complaint / Reason for Physician Visit  Nauseated, dry heaving. Complains of feeling thirsty. Discussed with RN events overnight. Review of Systems:  Symptom Y/N Comments  Symptom Y/N Comments   Fever/Chills n   Chest Pain n    Poor Appetite n   Edema n    Cough n   Abdominal Pain n    Sputum n   Joint Pain     SOB/JOHNSTON n   Pruritis/Rash     Nausea/vomit y   Tolerating PT/OT     Diarrhea    Tolerating Diet     Constipation    Other       Could NOT obtain due to:      Objective:     VITALS:   Last 24hrs VS reviewed since prior progress note.  Most recent are:  Patient Vitals for the past 24 hrs:   Temp Pulse Resp BP SpO2   18 1000 - 85 20 126/80 97 %   18 0900 - 85 26 123/81 98 %   09/04/18 0800 - 92 24 138/88 98 %   09/04/18 0744 98.2 °F (36.8 °C) 95 14 (!) 134/97 -   09/04/18 0700 - 90 16 124/78 -   09/04/18 0600 - 90 17 123/82 100 %   09/04/18 0500 - 95 14 128/75 96 %   09/04/18 0400 98.2 °F (36.8 °C) 99 18 128/76 100 %   09/04/18 0300 - 92 14 125/78 100 %   09/04/18 0200 - 95 19 126/70 99 %   09/04/18 0100 - 94 13 118/77 -   09/04/18 0000 98.5 °F (36.9 °C) (!) 101 16 107/66 100 %   09/03/18 2300 - 98 28 116/66 100 %   09/03/18 2200 - (!) 108 14 137/79 100 %   09/03/18 2100 - (!) 112 23 131/74 -   09/03/18 2000 98.5 °F (36.9 °C) (!) 107 24 130/67 99 %   09/03/18 1900 - (!) 115 19 122/59 99 %   09/03/18 1700 - (!) 116 22 135/72 -   09/03/18 1600 98.3 °F (36.8 °C) (!) 117 15 135/69 -   09/03/18 1500 - (!) 111 23 138/74 -   09/03/18 1400 - (!) 117 16 144/67 -       Intake/Output Summary (Last 24 hours) at 09/04/18 1354  Last data filed at 09/04/18 0700   Gross per 24 hour   Intake          2319.99 ml   Output             1525 ml   Net           794.99 ml        PHYSICAL EXAM:  General: WD, WN. Alert, cooperative, no acute distress    EENT:  EOMI. Anicteric sclerae. MMM  Resp:  CTA bilaterally, no wheezing or rales. No accessory muscle use  CV:  Regular rhythm,  No edema  GI:  Soft, Non distended, Non tender.  +Bowel sounds  Neurologic:  Alert and oriented X 3, normal speech,   Psych:   Some insight. Not anxious nor agitated  Skin:  No rashes.   No jaundice    Reviewed most current lab test results and cultures  YES  Reviewed most current radiology test results   YES  Review and summation of old records today    NO  Reviewed patient's current orders and MAR    YES  PMH/SH reviewed - no change compared to H&P  ________________________________________________________________________  Care Plan discussed with:    Comments   Patient x    Family      RN x    Care Manager     Consultant                        Multidiciplinary team rounds were held today with , nursing, pharmacist and clinical coordinator. Patient's plan of care was discussed; medications were reviewed and discharge planning was addressed. ________________________________________________________________________  Total NON critical care TIME:  35 Minutes    Total CRITICAL CARE TIME Spent:  Minutes non procedure based      Comments   >50% of visit spent in counseling and coordination of care x    ________________________________________________________________________  Krishna Alarcon MD     Procedures: see electronic medical records for all procedures/Xrays and details which were not copied into this note but were reviewed prior to creation of Plan. LABS:  I reviewed today's most current labs and imaging studies. Pertinent labs include:  Recent Labs      09/04/18   0545  09/03/18   0310  09/02/18   1233   WBC  6.3  9.6  24.9*   HGB  9.3*  11.7*  15.2   HCT  26.7*  31.7*  47.4   PLT  175  348  389     Recent Labs      09/04/18   0429  09/03/18   0929  09/03/18   0531   09/02/18   1233   NA  143  149*  151*   < >  122*   K  3.7  4.0  3.1*   < >  6.3*   CL  110*  116*  125*   < >  83*   CO2  25  25  18*   < >  <5*   GLU  254*  143*  339*   < >  1053*   BUN  19  39*  33*   < >  54*   CREA  0.75  1.07  0.89   < >  2.23*   CA  7.5*  8.2*  6.2*   < >  9.5   MG  2.1  2.1   --    --   2.9*   PHOS  1.8*  2.4*   --    --   9.4*   ALB   --    --    --    --   3.9   TBILI   --    --    --    --   0.5   SGOT   --    --    --    --   22   ALT   --    --    --    --   64    < > = values in this interval not displayed.        Signed: Krishna Alarcon MD

## 2018-09-04 NOTE — PROGRESS NOTES
9/4/2018    INTENSIVIST PROGRESS NOTE:     Patient seen and evaluated, chart reviewed   29 yo male admitted with DKA  Started on ivf, insulin drip  Now pt in CCU in no distress  No acute complaints  No acute events overnight    ROS: no sob, no cp, + N/V    Visit Vitals    BP (!) 134/97 (BP 1 Location: Left arm, BP Patient Position: At rest)    Pulse 95    Temp 98.2 °F (36.8 °C)    Resp 14    Ht 5' 9\" (1.753 m)    Wt 59.1 kg (130 lb 4.7 oz)    SpO2 100%    BMI 19.24 kg/m2       General: no distress  Eyes: anicteric  HEENT: dry oral mucosa  Neck: FROM  CV: RRR  Lungs: clear  Abd: soft  : no flank pain  Ext: no edema  Skin: no rash  Musculoskeletal: normal inspection  Neuro: non focal      Labs reviewed    A/P:  - DKA: insulin drip, transition to lantus and SSI  - FELECIA: resolved with IVF  - hyperkalemia: resolved   - IV pepcid  - advance diet as tolerated  - zofran for nausea  - Will assist on disposition planning when stable for transfer  Ludivina Fernando MD

## 2018-09-05 VITALS
WEIGHT: 130.29 LBS | HEIGHT: 69 IN | HEART RATE: 74 BPM | DIASTOLIC BLOOD PRESSURE: 103 MMHG | OXYGEN SATURATION: 99 % | TEMPERATURE: 98.6 F | BODY MASS INDEX: 19.3 KG/M2 | RESPIRATION RATE: 18 BRPM | SYSTOLIC BLOOD PRESSURE: 172 MMHG

## 2018-09-05 LAB
ANION GAP SERPL CALC-SCNC: 8 MMOL/L (ref 5–15)
BUN SERPL-MCNC: 12 MG/DL (ref 6–20)
BUN/CREAT SERPL: 26 (ref 12–20)
CALCIUM SERPL-MCNC: 8.8 MG/DL (ref 8.5–10.1)
CHLORIDE SERPL-SCNC: 107 MMOL/L (ref 97–108)
CO2 SERPL-SCNC: 25 MMOL/L (ref 21–32)
CREAT SERPL-MCNC: 0.46 MG/DL (ref 0.7–1.3)
ERYTHROCYTE [DISTWIDTH] IN BLOOD BY AUTOMATED COUNT: 13.1 % (ref 11.5–14.5)
GLUCOSE BLD STRIP.AUTO-MCNC: 116 MG/DL (ref 65–100)
GLUCOSE BLD STRIP.AUTO-MCNC: 143 MG/DL (ref 65–100)
GLUCOSE SERPL-MCNC: 135 MG/DL (ref 65–100)
HCT VFR BLD AUTO: 30.8 % (ref 36.6–50.3)
HGB BLD-MCNC: 10.9 G/DL (ref 12.1–17)
MCH RBC QN AUTO: 32.4 PG (ref 26–34)
MCHC RBC AUTO-ENTMCNC: 35.4 G/DL (ref 30–36.5)
MCV RBC AUTO: 91.7 FL (ref 80–99)
NRBC # BLD: 0 K/UL (ref 0–0.01)
NRBC BLD-RTO: 0 PER 100 WBC
PLATELET # BLD AUTO: 192 K/UL (ref 150–400)
PMV BLD AUTO: 10.6 FL (ref 8.9–12.9)
POTASSIUM SERPL-SCNC: 3.5 MMOL/L (ref 3.5–5.1)
RBC # BLD AUTO: 3.36 M/UL (ref 4.1–5.7)
SERVICE CMNT-IMP: ABNORMAL
SERVICE CMNT-IMP: ABNORMAL
SODIUM SERPL-SCNC: 140 MMOL/L (ref 136–145)
WBC # BLD AUTO: 6.3 K/UL (ref 4.1–11.1)

## 2018-09-05 PROCEDURE — 74011250636 HC RX REV CODE- 250/636: Performed by: INTERNAL MEDICINE

## 2018-09-05 PROCEDURE — 74011250637 HC RX REV CODE- 250/637: Performed by: INTERNAL MEDICINE

## 2018-09-05 PROCEDURE — 74011636637 HC RX REV CODE- 636/637: Performed by: INTERNAL MEDICINE

## 2018-09-05 PROCEDURE — 85027 COMPLETE CBC AUTOMATED: CPT | Performed by: INTERNAL MEDICINE

## 2018-09-05 PROCEDURE — 80048 BASIC METABOLIC PNL TOTAL CA: CPT | Performed by: INTERNAL MEDICINE

## 2018-09-05 PROCEDURE — 82962 GLUCOSE BLOOD TEST: CPT

## 2018-09-05 RX ORDER — PANTOPRAZOLE SODIUM 40 MG/1
40 TABLET, DELAYED RELEASE ORAL
Qty: 30 TAB | Refills: 1 | Status: ON HOLD | OUTPATIENT
Start: 2018-09-06 | End: 2018-11-20 | Stop reason: SDUPTHER

## 2018-09-05 RX ORDER — GABAPENTIN 100 MG/1
200 CAPSULE ORAL
Qty: 60 CAP | Refills: 1 | Status: ON HOLD | OUTPATIENT
Start: 2018-09-05 | End: 2018-11-20 | Stop reason: SDUPTHER

## 2018-09-05 RX ADMIN — Medication 10 ML: at 13:57

## 2018-09-05 RX ADMIN — INSULIN LISPRO 5 UNITS: 100 INJECTION, SOLUTION INTRAVENOUS; SUBCUTANEOUS at 09:00

## 2018-09-05 RX ADMIN — PANTOPRAZOLE SODIUM 40 MG: 40 TABLET, DELAYED RELEASE ORAL at 09:00

## 2018-09-05 RX ADMIN — INSULIN LISPRO 2 UNITS: 100 INJECTION, SOLUTION INTRAVENOUS; SUBCUTANEOUS at 12:35

## 2018-09-05 RX ADMIN — ACETAMINOPHEN 500 MG: 500 TABLET ORAL at 12:27

## 2018-09-05 RX ADMIN — ONDANSETRON 4 MG: 2 INJECTION, SOLUTION INTRAMUSCULAR; INTRAVENOUS at 09:00

## 2018-09-05 RX ADMIN — HYDROCODONE BITARTRATE AND ACETAMINOPHEN 1 TABLET: 5; 325 TABLET ORAL at 09:00

## 2018-09-05 RX ADMIN — INSULIN GLARGINE 35 UNITS: 100 INJECTION, SOLUTION SUBCUTANEOUS at 12:28

## 2018-09-05 RX ADMIN — INSULIN LISPRO 5 UNITS: 100 INJECTION, SOLUTION INTRAVENOUS; SUBCUTANEOUS at 12:34

## 2018-09-05 NOTE — DISCHARGE INSTRUCTIONS
HOSPITALIST DISCHARGE INSTRUCTIONS    NAME: Tiffany Obregon   :  1980   MRN:  [id number]     Date/Time:  2018 2:39 PM    ADMIT DATE: 2018   DISCHARGE DATE: 2018         · It is important that you take the medication exactly as they are prescribed. · Keep your medication in the bottles provided by the pharmacist and keep a list of the medication names, dosages, and times to be taken in your wallet. · Do not take other medications without consulting your doctor. What to do at Home    Recommended diet:  Diabetic Diet   No Concentrated Sweets    Recommended activity: Activity as tolerated      If you have questions regarding the hospital related prescriptions or hospital related issues please call [facility] at [phone]. You can always direct your questions to your primary care doctor if you are unable to reach your hospital physician; your PCP works as an extension of your hospital doctor just like your hospital doctor is an extension of your PCP for your time at the hospital [facility], [facility])    If you experience any of the following symptoms then please call your primary care physician or return to the emergency room if you cannot get hold of your doctor:    Fever, chills, nausea, vomiting, or persistent diarrhea  Worsening weakness or new problems with your speech or balance  Dark stools or visible blood in your stools  New Leg swelling or shortness of breath as these could be signs of a clot    Additional Instructions:    MONITOR YOUR BLOOD SUGARS  Check it Fasting and before Lunch and Before Dinner and at Bedtime  This is the only way your PCP will be able to help you adjust your Insulin  Please Follow up with your Assigned PCP    Bring these papers with you to your follow up appointments.  The papers will help your doctors be sure to continue the care plan from the hospital.              Information obtained by :  I understand that if any problems occur once I am at home I am to contact my physician. I understand and acknowledge receipt of the instructions indicated above. [de-identified] or R.N.'s Signature                                                                  Date/Time                                                                                                                                              Patient or Representative Signature                                      Learning About Diabetes Food Guidelines  Your Care Instructions    Meal planning is important to manage diabetes. It helps keep your blood sugar at a target level (which you set with your doctor). You don't have to eat special foods. You can eat what your family eats, including sweets once in a while. But you do have to pay attention to how often you eat and how much you eat of certain foods. You may want to work with a dietitian or a certified diabetes educator (CDE) to help you plan meals and snacks. A dietitian or CDE can also help you lose weight if that is one of your goals. What should you know about eating carbs? Managing the amount of carbohydrate (carbs) you eat is an important part of healthy meals when you have diabetes. Carbohydrate is found in many foods. · Learn which foods have carbs. And learn the amounts of carbs in different foods. ¨ Bread, cereal, pasta, and rice have about 15 grams of carbs in a serving. A serving is 1 slice of bread (1 ounce), ½ cup of cooked cereal, or 1/3 cup of cooked pasta or rice. ¨ Fruits have 15 grams of carbs in a serving. A serving is 1 small fresh fruit, such as an apple or orange; ½ of a banana; ½ cup of cooked or canned fruit; ½ cup of fruit juice; 1 cup of melon or raspberries; or 2 tablespoons of dried fruit. ¨ Milk and no-sugar-added yogurt have 15 grams of carbs in a serving.  A serving is 1 cup of milk or 2/3 cup of no-sugar-added yogurt. ¨ Starchy vegetables have 15 grams of carbs in a serving. A serving is ½ cup of mashed potatoes or sweet potato; 1 cup winter squash; ½ of a small baked potato; ½ cup of cooked beans; or ½ cup cooked corn or green peas. · Learn how much carbs to eat each day and at each meal. A dietitian or CDE can teach you how to keep track of the amount of carbs you eat. This is called carbohydrate counting. · If you are not sure how to count carbohydrate grams, use the Plate Method to plan meals. It is a good, quick way to make sure that you have a balanced meal. It also helps you spread carbs throughout the day. ¨ Divide your plate by types of foods. Put non-starchy vegetables on half the plate, meat or other protein food on one-quarter of the plate, and a grain or starchy vegetable in the final quarter of the plate. To this you can add a small piece of fruit and 1 cup of milk or yogurt, depending on how many carbs you are supposed to eat at a meal.  · Try to eat about the same amount of carbs at each meal. Do not \"save up\" your daily allowance of carbs to eat at one meal.  · Proteins have very little or no carbs per serving. Examples of proteins are beef, chicken, turkey, fish, eggs, tofu, cheese, cottage cheese, and peanut butter. A serving size of meat is 3 ounces, which is about the size of a deck of cards. Examples of meat substitute serving sizes (equal to 1 ounce of meat) are 1/4 cup of cottage cheese, 1 egg, 1 tablespoon of peanut butter, and ½ cup of tofu. How can you eat out and still eat healthy? · Learn to estimate the serving sizes of foods that have carbohydrate. If you measure food at home, it will be easier to estimate the amount in a serving of restaurant food. · If the meal you order has too much carbohydrate (such as potatoes, corn, or baked beans), ask to have a low-carbohydrate food instead. Ask for a salad or green vegetables.   · If you use insulin, check your blood sugar before and after eating out to help you plan how much to eat in the future. · If you eat more carbohydrate at a meal than you had planned, take a walk or do other exercise. This will help lower your blood sugar. What else should you know? · Limit saturated fat, such as the fat from meat and dairy products. This is a healthy choice because people who have diabetes are at higher risk of heart disease. So choose lean cuts of meat and nonfat or low-fat dairy products. Use olive or canola oil instead of butter or shortening when cooking. · Don't skip meals. Your blood sugar may drop too low if you skip meals and take insulin or certain medicines for diabetes. · Check with your doctor before you drink alcohol. Alcohol can cause your blood sugar to drop too low. Alcohol can also cause a bad reaction if you take certain diabetes medicines. Follow-up care is a key part of your treatment and safety. Be sure to make and go to all appointments, and call your doctor if you are having problems. It's also a good idea to know your test results and keep a list of the medicines you take. Where can you learn more? Go to http://dwaine-sanjeev.info/. Enter M054 in the search box to learn more about \"Learning About Diabetes Food Guidelines. \"  Current as of: December 7, 2017  Content Version: 11.7  © 5614-6811 [employer]. Care instructions adapted under license by [employer] (which disclaims liability or warranty for this information). If you have questions about a medical condition or this instruction, always ask your healthcare professional. [place] [address] any warranty or liability for your use of this information. Learning About Meal Planning for Diabetes  Why plan your meals? Meal planning can be a key part of managing diabetes.  Planning meals and snacks with the right balance of carbohydrate, protein, and fat can help you keep your blood sugar at the target level you set with your doctor. You don't have to eat special foods. You can eat what your family eats, including sweets once in a while. But you do have to pay attention to how often you eat and how much you eat of certain foods. You may want to work with a dietitian or a certified diabetes educator. He or she can give you tips and meal ideas and can answer your questions about meal planning. This health professional can also help you reach a healthy weight if that is one of your goals. What plan is right for you? Your dietitian or diabetes educator may suggest that you start with the plate format or carbohydrate counting. The plate format  The plate format is a simple way to help you manage how you eat. You plan meals by learning how much space each food should take on a plate. Using the plate format helps you spread carbohydrate throughout the day. It can make it easier to keep your blood sugar level within your target range. It also helps you see if you're eating healthy portion sizes. To use the plate format, you put non-starchy vegetables on half your plate. Add meat or meat substitutes on one-quarter of the plate. Put a grain or starchy vegetable (such as brown rice or a potato) on the final quarter of the plate. You can add a small piece of fruit and some low-fat or fat-free milk or yogurt, depending on your carbohydrate goal for each meal.  Here are some tips for using the plate format:  · Make sure that you are not using an oversized plate. A 9-inch plate is best. Many restaurants use larger plates. · Get used to using the plate format at home. Then you can use it when you eat out. · Write down your questions about using the plate format. Talk to your doctor, a dietitian, or a diabetes educator about your concerns. Carbohydrate counting  With carbohydrate counting, you plan meals based on the amount of carbohydrate in each food.  Carbohydrate raises blood sugar higher and more quickly than any other nutrient. It is found in desserts, breads and cereals, and fruit. It's also found in starchy vegetables such as potatoes and corn, grains such as rice and pasta, and milk and yogurt. Spreading carbohydrate throughout the day helps keep your blood sugar levels within your target range. Your daily amount depends on several things, including your weight, how active you are, which diabetes medicines you take, and what your goals are for your blood sugar levels. A registered dietitian or diabetes educator can help you plan how much carbohydrate to include in each meal and snack. A guideline for your daily amount of carbohydrate is:  · 45 to 60 grams at each meal. That's about the same as 3 to 4 carbohydrate servings. · 15 to 20 grams at each snack. That's about the same as 1 carbohydrate serving. The Nutrition Facts label on packaged foods tells you how much carbohydrate is in a serving of the food. First, look at the serving size on the food label. Is that the amount you eat in a serving? All of the nutrition information on a food label is based on that serving size. So if you eat more or less than that, you'll need to adjust the other numbers. Total carbohydrate is the next thing you need to look for on the label. If you count carbohydrate servings, one serving of carbohydrate is 15 grams. For foods that don't come with labels, such as fresh fruits and vegetables, you'll need a guide that lists carbohydrate in these foods. Ask your doctor, dietitian, or diabetes educator about books or other nutrition guides you can use. If you take insulin, you need to know how many grams of carbohydrate are in a meal. This lets you know how much rapid-acting insulin to take before you eat. If you use an insulin pump, you get a constant rate of insulin during the day. So the pump must be programmed at meals to give you extra insulin to cover the rise in blood sugar after meals.   When you know how much carbohydrate you will eat, you can take the right amount of insulin. Or, if you always use the same amount of insulin, you need to make sure that you eat the same amount of carbohydrate at meals. If you need more help to understand carbohydrate counting and food labels, ask your doctor, dietitian, or diabetes educator. How do you get started with meal planning? Here are some tips to get started:  · Plan your meals a week at a time. Don't forget to include snacks too. · Use cookbooks or online recipes to plan several main meals. Plan some quick meals for busy nights. You also can double some recipes that freeze well. Then you can save half for other busy nights when you don't have time to cook. · Make sure you have the ingredients you need for your recipes. If you're running low on basic items, put these items on your shopping list too. · List foods that you use to make breakfasts, lunches, and snacks. List plenty of fruits and vegetables. · Post this list on the refrigerator. Add to it as you think of more things you need. · Take the list to the store to do your weekly shopping. Follow-up care is a key part of your treatment and safety. Be sure to make and go to all appointments, and call your doctor if you are having problems. It's also a good idea to know your test results and keep a list of the medicines you take. Where can you learn more? Go to http://dwaine-sanjeev.info/. Jimena Necessary in the search box to learn more about \"Learning About Meal Planning for Diabetes. \"  Current as of: December 7, 2017  Content Version: 11.7  © 9950-0193 [employer]. Care instructions adapted under license by [employer] (which disclaims liability or warranty for this information).  If you have questions about a medical condition or this instruction, always ask your healthcare professional. [address] any warranty or liability for your use of this information. Diabetes Blood Sugar Emergencies: Your Action Plan  How can you prevent a blood sugar emergency? An important part of living with diabetes is keeping your blood sugar in your target range. You'll need to know what to do if it's too high or too low. Managing your blood sugar levels helps you avoid emergencies. This care sheet will teach you about the signs of high and low blood sugar. It will help you make an action plan with your doctor for when these signs occur. Low blood sugar is more likely to happen if you take certain medicines for diabetes. It can also happen if you skip a meal, drink alcohol, or exercise more than usual.  You may get high blood sugar if you eat differently than you normally do. One example is eating more carbohydrate than usual. Having a cold, the flu, or other sudden illness can also cause high blood sugar levels. Levels can also rise if you miss a dose of medicine. Any change in how you take your medicine may affect your blood sugar level. So it's important to work with your doctor before you make any changes. Check your blood sugar  Work with your doctor to fill in the blank spaces below that apply to you. Track your levels, know your target range, and write down ways you can get your blood sugar back in your target range. A log book can help you track your levels. Take the book to all of your medical appointments. · Check your blood sugar _____ times a day, at these times:________________________________________________. (For example: Before meals, at bedtime, before exercise, during exercise, other.)  · Your blood sugar target range before a meal is ___________________. Your blood sugar target range after a meal is _______________________. · Do rkcu-___________________________________________________-be get your blood sugar back within your safe range if your blood sugar results are _________________________________________.  (For example: Less than 70 or above 250 mg/dL.)  Call your doctor when your blood sugar results are ___________________________________. (For example: Less than 70 or above 250 mg/dL.)  What are the symptoms of low and high blood sugar? Common symptoms of low blood sugar are sweating and feeling shaky, weak, hungry, or confused. Symptoms can start quickly. Common symptoms of high blood sugar are feeling very thirsty or very hungry. You may also pass urine more often than usual. You may have blurry vision and may lose weight without trying. But some people may have high or low blood sugar without having any symptoms. That's a good reason to check your blood sugar on a regular schedule. What should you do if you have symptoms? Work with your doctor to fill in the blank spaces below that apply to you. Low blood sugar  If you have symptoms of low blood sugar, check your blood sugar. If it's below _____ ( for example, below 70), eat or drink a quick-sugar food that has about 15 grams of carbohydrate. Your goal is to get your level back to your safe range. Check your blood sugar again 15 minutes later. If it's still not in your target range, take another 15 grams of carbohydrate and check your blood sugar again in 15 minutes. Repeat this until you reach your target. Then go back to your regular testing schedule. When you have low blood sugar, it's best to stop or reduce any physical activity until your blood sugar is back in your target range and is stable. If you must stay active, eat or drink 30 grams of carbohydrate. Then check your blood sugar again in 15 minutes. If it's not in your target range, take another 30 grams of carbohydrates. Check your blood sugar again in 15 minutes. Keep doing this until you reach your target. You can then go back to your regular testing schedule. If your symptoms or blood sugar levels are getting worse or have not improved after 15 minutes, seek medical care right away.   Here are some examples of quick-sugar foods with 15 grams of carbohydrate:  · 3 or 4 glucose tablets  · 1 tube of glucose gel  · Hard candy (such as 3 Jolly Ranchers or 5 to 7 Life Savers)  · ½ cup to ¾ cup (4 to 6 ounces) of fruit juice or regular (not diet) soda  High blood sugar  If you have symptoms of high blood sugar, check your blood sugar. Your goal is to get your level back to your target range. If it's above ______ ( for example, above 250), follow these steps:  · If you missed a dose of your diabetes medicine, take it now. Take only the amount of medicine that you have been prescribed. Do not take more or less medicine. · Give yourself insulin if your doctor has prescribed it for high blood sugar. · Test for ketones, if the doctor told you to do so. If the results of the ketone test show a moderate-to-large amount of ketones, call the doctor for advice. · Wait 30 minutes after you take the extra insulin or the missed medicine. Check your blood sugar again. If your symptoms or blood sugar levels are getting worse or have not improved after taking these steps, seek medical care right away. Follow-up care is a key part of your treatment and safety. Be sure to make and go to all appointments, and call your doctor if you are having problems. It's also a good idea to know your test results and keep a list of the medicines you take. Where can you learn more? Go to http://dwaine-sanjeev.info/. Enter A867 in the search box to learn more about \"Diabetes Blood Sugar Emergencies: Your Action Plan. \"  Current as of: December 7, 2017  Content Version: 11.7  © 9649-0713 [employer]. Care instructions adapted under license by [employer] (which disclaims liability or warranty for this information).  If you have questions about a medical condition or this instruction, always ask your healthcare professional. [facility] any warranty or liability for your use of this information. Counting Carbohydrates: Care Instructions  Your Care Instructions    You don't have to eat special foods when you have diabetes. You just have to be careful to eat healthy foods. Carbohydrates (carbs) raise blood sugar higher and quicker than any other nutrient. Carbs are found in desserts, breads and cereals, and fruit. They're also in starchy vegetables. These include potatoes, corn, and grains such as rice and pasta. Carbs are also in milk and yogurt. The more carbs you eat at one time, the higher your blood sugar will rise. Spreading carbs all through the day helps keep your blood sugar levels within your target range. Counting carbs is one of the best ways to keep your blood sugar under control. If you use insulin, counting carbs helps you match the right amount of insulin to the number of grams of carbs in a meal. Then you can change your diet and insulin dose as needed. Testing your blood sugar several times a day can help you learn how carbs affect your blood sugar. A registered dietitian or certified diabetes educator can help you plan meals and snacks. Follow-up care is a key part of your treatment and safety. Be sure to make and go to all appointments, and call your doctor if you are having problems. It's also a good idea to know your test results and keep a list of the medicines you take. How can you care for yourself at home? Know your daily amount of carbohydrates  Your daily amount depends on several things, such as your weight, how active you are, which diabetes medicines you take, and what your goals are for your blood sugar levels. A registered dietitian or certified diabetes educator can help you plan how many carbs to include in each meal and snack. For most adults, a guideline for the daily amount of carbs is:  · 45 to 60 grams at each meal. That's about the same as 3 to 4 carbohydrate servings. · 15 to 20 grams at each snack.  That's about the same as 1 carbohydrate serving. Count carbs  Counting carbs lets you know how much rapid-acting insulin to take before you eat. If you use an insulin pump, you get a constant rate of insulin during the day. So the pump must be programmed at meals. This gives you extra insulin to cover the rise in blood sugar after meals. If you take insulin:  · Learn your own insulin-to-carb ratio. You and your diabetes health professional will figure out the ratio. You can do this by testing your blood sugar after meals. For example, you may need a certain amount of insulin for every 15 grams of carbs. · Add up the carb grams in a meal. Then you can figure out how many units of insulin to take based on your insulin-to-carb ratio. · Exercise lowers blood sugar. You can use less insulin than you would if you were not doing exercise. Keep in mind that timing matters. If you exercise within 1 hour after a meal, your body may need less insulin for that meal than it would if you exercised 3 hours after the meal. Test your blood sugar to find out how exercise affects your need for insulin. If you do or don't take insulin:  · Look at labels on packaged foods. This can tell you how many carbs are in a serving. You can also use guides from the American Diabetes Association. · Be aware of portions, or serving sizes. If a package has two servings and you eat the whole package, you need to double the number of grams of carbohydrate listed for one serving. · Protein, fat, and fiber do not raise blood sugar as much as carbs do. If you eat a lot of these nutrients in a meal, your blood sugar will rise more slowly than it would otherwise. Eat from all food groups  · Eat at least three meals a day. · Plan meals to include food from all the food groups. The food groups include grains, fruits, dairy, proteins, and vegetables. · Talk to your dietitian or diabetes educator about ways to add limited amounts of sweets into your meal plan.   · If you drink alcohol, talk to your doctor. It may not be recommended when you are taking certain diabetes medicines. Where can you learn more? Go to http://dwaine-sanjeev.info/. Enter N187 in the search box to learn more about \"Counting Carbohydrates: Care Instructions. \"  Current as of: December 7, 2017  Content Version: 11.7  © 1686-3361 [employer]. Care instructions adapted under license by [employer] (which disclaims liability or warranty for this information). If you have questions about a medical condition or this instruction, always ask your healthcare professional. [address] any warranty or liability for your use of this information. Learning About Diabetes and Exercise  Can you exercise if you have diabetes? When you have diabetes, it's important to get regular exercise. This helps control your blood sugar level. You can still play sports, run, ride a bike, go swimming, and do other activities when you have diabetes. How can exercise help you manage diabetes? Your body turns the food you eat into glucose, a type of sugar. You need this sugar for energy. When you have diabetes, the sugar builds up in your blood. But when you exercise, your body uses sugar. This helps keep it from building up in your blood and results in lower blood sugar and better control of diabetes. Exercise may help you in other ways too. It can help you reach and stay at a healthy weight. It also helps improve blood pressure and cholesterol, which can reduce the risk of heart disease. Exercise can make you feel stronger and happier. It can help you relax and sleep better, and give you confidence in other things you do. How can you exercise safely? Before you start a new exercise program, talk to your doctor about how and when to exercise. You may need to have a medical exam and tests before you begin.  Some types of exercise can be harmful if your diabetes is causing other problems, such as problems with your feet. Your doctor can tell you what types of exercise are good choices for you. These tips can help you exercise safely when you have diabetes. If your diabetes is controlled by diet or medicine that doesn't lower your blood sugar, you don't need to eat a snack before you exercise. · Check your blood sugar before you exercise. And be careful about what you eat. ¨ If your blood sugar is less than 100, eat a carbohydrate snack before you exercise. ¨ Be careful when you exercise if your blood sugar is over 300. High blood sugar can make you dehydrated. And that makes your blood sugar levels go even higher. If you have ketones in your blood or urine and your blood sugar is over 300, do not exercise. · Don't try to do too much at first. Build up your exercise program bit by bit. Try to get at least 30 minutes of exercise on most days of the week. Walking is a good choice. You also may want to do other activities, such as riding a bike or swimming. You might try running or gardening. Try to include muscle-strengthening exercises at least 2 times a week. These exercises include push-ups and weight training. You can also use rubber tubing or stretch bands. You stretch or pull the tubing or band to build muscle strength. If you want to exercise more, slowly increase how hard or long you exercise. · You may get symptoms of low blood sugar during exercise or up to 24 hours later. Some symptoms of low blood sugar, such as sweating, a fast heartbeat, or feeling tired, can be confused with what can happen anytime you exercise. Other symptoms may include feeling anxious, dizzy, weak, or shaky. So it's a good idea to check your blood sugar again. · You can treat low blood sugar by eating or drinking something that has 15 grams of carbohydrate. These should be quick-sugar foods.  Hodan Finders foods such as fruit juice, regular (not diet) soda, glucose tablets, hard candy, or raisins can help raise blood sugar. Check your blood sugar level again 15 minutes after having a quick-sugar food to make sure your level is getting back to your target range. · Drink plenty of water before, during, and after you exercise. · Wear medical alert jewelry that says you have diabetes. You can buy this at most drugstores. · Pay attention to your body. If you are used to exercise and notice that you can't do as much as usual, talk to your doctor. Follow-up care is a key part of your treatment and safety. Be sure to make and go to all appointments, and call your doctor if you are having problems. It's also a good idea to know your test results and keep a list of the medicines you take. Where can you learn more? Go to http://dwaineFastpoint Gamessanjeev.info/. Enter Q000 in the search box to learn more about \"Learning About Diabetes and Exercise. \"  Current as of: December 7, 2017  Content Version: 11.7  © 4197-1637 [employer]. Care instructions adapted under license by [employer] (which disclaims liability or warranty for this information). If you have questions about a medical condition or this instruction, always ask your healthcare professional. [place] [address] any warranty or liability for your use of this information. Diabetic Neuropathy: Care Instructions  Your Care Instructions    When you have diabetes, your blood sugar level may get too high. Over time, high blood sugar levels can damage nerves. This is called diabetic neuropathy. Nerve damage can cause pain, burning, tingling, and numbness and may leave you feeling weak. The feet are often affected. When you have nerve damage in your feet, you cannot feel your feet and toes as well as normal and may not notice cuts or sores. Even a small injury can lead to a serious infection. It is very important that you follow your doctor's advice on foot care.   Sometimes diabetes damages nerves that help the body function. If this happens, your blood pressure, sweating, digestion, and urination might be affected. Your doctor may give you a target blood sugar level that is higher or lower than you are used to. Try to keep your blood sugar very close to this target level to prevent more damage. Follow-up care is a key part of your treatment and safety. Be sure to make and go to all appointments, and call your doctor if you are having problems. It's also a good idea to know your test results and keep a list of the medicines you take. How can you care for yourself at home? · Take your medicines exactly as prescribed. Call your doctor if you think you are having a problem with your medicine. It is very important that you take your insulin or diabetes pills as your doctor tells you. · Try to keep blood sugar at your target level. ¨ Eat a variety of healthy foods, with carbohydrate spread out in your meals. A dietitian can help you plan meals. ¨ Try to get at least 30 minutes of exercise on most days. ¨ Check your blood sugar as many times each day as your doctor recommends. · Take and record your blood pressure at home if your doctor tells you to. Learn the importance of the two measures of blood pressure (such as 130 over 80, or 130/80). To take your blood pressure at home:  ¨ Ask your doctor to check your blood pressure monitor to be sure it is accurate and the cuff fits you. Also ask your doctor to watch you to make sure that you are using it right. ¨ Do not use medicine known to raise blood pressure (such as some nasal decongestant sprays) before taking your blood pressure. ¨ Avoid taking your blood pressure if you have just exercised or are nervous or upset. Rest at least 15 minutes before you take a reading. · Take pain medicines exactly as directed. ¨ If the doctor gave you a prescription medicine for pain, take it as prescribed.   ¨ If you are not taking a prescription pain medicine, ask your doctor if you can take an over-the-counter medicine. · Do not smoke. Smoking can increase your chance for a heart attack or stroke. If you need help quitting, talk to your doctor about stop-smoking programs and medicines. These can increase your chances of quitting for good. · Limit alcohol to 2 drinks a day for men and 1 drink a day for women. Too much alcohol can cause health problems. · Eat small meals often, rather than 2 or 3 large meals a day. To care for your feet  · Prevent injury by wearing shoes at all times, even when you are indoors. · Do foot care as part of your daily routine. Wash your feet and then rub lotion on your feet, but not between your toes. Use a handheld mirror or magnifying mirror to inspect your feet for blisters, cuts, cracks, or sores. · Have your toenails trimmed and filed straight across. · Wear shoes and socks that fit well. Soft shoes that have good support and that fit well (such as tennis shoes) are best for your feet. · Check your shoes for any loose objects or rough edges before you put them on. · Ask your doctor to check your feet during each visit. Your doctor may notice a foot problem you have missed. · Get early treatment for any foot problem, even a minor one. When should you call for help? Call your doctor now or seek immediate medical care if:    · You have symptoms of infection, such as:  ¨ Increased pain, swelling, warmth, or redness. ¨ Red streaks leading from the area. ¨ Pus draining from the area. ¨ A fever.     · You have new or worse numbness, pain, or tingling in any part of your body.    Watch closely for changes in your health, and be sure to contact your doctor if:    · You have a new problem with your feet, such as:  ¨ A new sore or ulcer. ¨ A break in the skin that is not healing after several days. ¨ Bleeding corns or calluses. ¨ An ingrown toenail.     · You do not get better as expected. Where can you learn more?   Go to http://dwaine-sanjeev.info/. Enter I604 in the search box to learn more about \"Diabetic Neuropathy: Care Instructions. \"  Current as of: December 7, 2017  Content Version: 11.7  © 4052-2001 [employer], [facility]. Care instructions adapted under license by [employer] (which disclaims liability or warranty for this information). If you have questions about a medical condition or this instruction, always ask your healthcare professional. [place] [address] any warranty or liability for your use of this information.

## 2018-09-05 NOTE — PROGRESS NOTES
Bedside and Verbal shift change report given to Stephani More RN (oncoming nurse) by Ailyn Carrasquillo RN (offgoing nurse). Report included the following information SBAR, Kardex, MAR and Recent Results.

## 2018-09-05 NOTE — DISCHARGE SUMMARY
Hospitalist Discharge Summary     Patient ID:  Zoran Silvestre  [id number]  30 y.o.  1980    PCP on record: None    Admit date: 9/2/2018  Discharge date and time: 9/5/2018      DISCHARGE DIAGNOSIS:    SIRS due to DKA (resolved) in setting of DM1 uncontrolled:  DKA in 6/18 as well, felt to be noncompliant   Neuropathy  Hypophosphatemia:  Reported hematemesis in setting of multiple episodes of emesis, resolved  DKA vs Gastroparesis? Hyperkalemia, resolved  FELECIA, resolved   Tobacco use disorder: smokes 1ppd        CONSULTATIONS:  None    Excerpted HPI from H&P of Rachelle Florentino MD:  History obtained from the pt's Fiance. The pt was 'fine' until yesterday evening when the pt started complaining of nausea. That was followed by heaving and vomiting eaten food and some fresh blood. His fiance could not quantitate the amount of blood in the emesis and pt is not able to provide much information either. Pt continued to feel unwell and this morning his family asked for help from EMS. ER workup shows DKA with moderate hyperkalemia, possibly stress leukocytosis. We were asked to admit him     ______________________________________________________________________  DISCHARGE SUMMARY/HOSPITAL COURSE:  for full details see H&P, daily progress notes, labs, consult notes. SIRS due to DKA (resolved) in setting of DM1 uncontrolled: recent admission for DKA in 6/18 as well, felt to be noncompliant with therapy. Pt remains nauseated and is dry heaving today. - leukocytosis resolved today  - anion gap closed x2 but will con't insulin gtt and IV fluid support today due to severe nausea. Orders to transition off insulin gtt written for tomorrow. - start lantus and sliding scale tomorrow  - HgA1c ordered  - appreciate DTC assistance  - CM consulted for assistance, has no insurance which seems to be a barrier.  This is not a case of intentional non-compliance but rather financial limitation leading to poor compliance. He also does not follow his BS      Neuropathy  Neurontin 200 mg HS     Hypophosphatemia:  - replacing  - recheck K, mag, phos in AM   Reported hematemesis in setting of multiple episodes of emesis, resolved  DKA vs Gastroparesis?  - con't IV pepcid today change to protonix  Hyperkalemia, resolved  FELECIA, resolved   Tobacco use disorder: smokes 1ppd  -nicotine patch      Code status: Full  Prophylaxis: Hep SQ starting tomorrow if no additional hematemesis          _______________________________________________________________________  Patient seen and examined by me on discharge day. Pertinent Findings:  Gen:    Not in distress  Chest: Clear lungs  CVS:   Regular rhythm. No edema  Abd:  Soft, not distended, not tender  Neuro:  Alert, Ox3  _______________________________________________________________________  DISCHARGE MEDICATIONS:   Current Discharge Medication List      START taking these medications    Details   gabapentin (NEURONTIN) 100 mg capsule Take 2 Caps by mouth nightly. Qty: 60 Cap, Refills: 1      pantoprazole (PROTONIX) 40 mg tablet Take 1 Tab by mouth Daily (before breakfast). Qty: 30 Tab, Refills: 1      OTHER Glucometer from [address]      These are over the counter and very affordable  Qty: 1 Units, Refills: 1         CONTINUE these medications which have NOT CHANGED    Details   acetaminophen (TYLENOL EXTRA STRENGTH) 500 mg tablet Take 1,000 mg by mouth two (2) times daily as needed ('Pain/Headache'). insulin regular (NOVOLIN R) 100 unit/mL injection 2-10 Units by SubCUTAneous route See Admin Instructions. Take 3-4 times daily per sliding scale; Patient is not sure of exact scale.'      insulin glargine (LANTUS) 100 unit/mL injection 46 Units by SubCUTAneous route daily.              My Recommended Diet, Activity, Wound Care, and follow-up labs are listed in the patient's Discharge Insturctions which I have personally completed and reviewed.     ______________________________________________________________________    Risk of deterioration: Low    Condition at Discharge:  Stable  ______________________________________________________________________    Disposition  Home with family, no needs  ______________________________________________________________________    Care Plan discussed with:   Patient, Family, RN    ______________________________________________________________________    Code Status: Full Code  ______________________________________________________________________      Follow up with:   PCP : None  Follow-up Information     Follow up With Details Comments Contact Yoli Beckford MD Go on 9/10/2018 For new patient appointment at 1:00PM  [address] [place] [address] Internal Medicine  [address] [place] [address]  [phone]      None   None ([phone]) Patient stated that they have no PCP                Total time in minutes spent coordinating this discharge (includes going over instructions, follow-up, prescriptions, and preparing report for sign off to her PCP) :   minutes    Signed:  Shital Snyder MD

## 2018-09-05 NOTE — PROGRESS NOTES
Pt is a self pay patient. D/C plans//consult noted. Pt had admissions on 6/8/18 and 3/11/18; buys his meds from [facility]; has glucometer and admits to not using it; is a new father to dtr; we made him a new PCP appointment which is on the AVR for 9/10; Med Assist is involved; the pt is a cigarette smoker of ~1 pack per day; we appreciate the DTC for all the followup and support they have provided noted in their 9/4/ note; they will see him 9/25.

## 2018-09-10 ENCOUNTER — OFFICE VISIT (OUTPATIENT)
Dept: INTERNAL MEDICINE CLINIC | Age: 38
End: 2018-09-10

## 2018-09-10 VITALS
DIASTOLIC BLOOD PRESSURE: 72 MMHG | HEIGHT: 69 IN | HEART RATE: 91 BPM | WEIGHT: 141.4 LBS | SYSTOLIC BLOOD PRESSURE: 114 MMHG | RESPIRATION RATE: 16 BRPM | TEMPERATURE: 98 F | BODY MASS INDEX: 20.94 KG/M2 | OXYGEN SATURATION: 98 %

## 2018-09-10 DIAGNOSIS — R12 HEART BURN: ICD-10-CM

## 2018-09-10 DIAGNOSIS — F31.75 BIPOLAR DISORDER, IN PARTIAL REMISSION, MOST RECENT EPISODE DEPRESSED (HCC): ICD-10-CM

## 2018-09-10 DIAGNOSIS — Z72.0 TOBACCO ABUSE: Chronic | ICD-10-CM

## 2018-09-10 DIAGNOSIS — G62.9 NEUROPATHY: ICD-10-CM

## 2018-09-10 RX ORDER — INSULIN GLARGINE 100 [IU]/ML
INJECTION, SOLUTION SUBCUTANEOUS
Qty: 1 VIAL | Refills: 0 | Status: SHIPPED | OUTPATIENT
Start: 2018-09-10 | End: 2018-11-20

## 2018-09-10 NOTE — MR AVS SNAPSHOT
[address] [facility] [address] 
[phone] Patient: Joyce Martinez MRN:  [id number] Visit Information Date & Time Provider Department Dept. Phone Encounter #  
 9/10/2018  1:00 PM Cal Spurling, [address] [facility],[address] Internal Medicine [phone] [id number] Your Appointments 9/24/2018 11:15 AM  
ROUTINE CARE with Cal Spurling, MD  
[facility] ([facility]) Appt Note: 2 week follow up [address] [place] [address]  
[phone] [place] [address] Upcoming Health Maintenance Date Due  
 EYE EXAM RETINAL OR DILATED Q1 11/22/1992 Pneumococcal 19-64 Highest Risk (2 of 3 - PCV13) 8/18/2012 MICROALBUMIN Q1 11/5/2015 LIPID PANEL Q1 3/22/2018 Influenza Age 5 to Adult 8/1/2018 HEMOGLOBIN A1C Q6M 3/4/2019 FOOT EXAM Q1 3/11/2019 DTaP/Tdap/Td series (2 - Td) 3/9/2028 Allergies as of 9/10/2018  Review Complete On: 9/10/2018 By: Cal Spurling, MD  
 No Known Allergies Current Immunizations  Reviewed on 5/1/2014 Name Date  
 TD Vaccine 3/8/2011  9:00 AM  
 Tdap 3/9/2018  8:32 PM  
 ZZZ-RETIRED (DO NOT USE) Pneumococcal Vaccine (Unspecified Type) 8/18/2011 Not reviewed this visit You Were Diagnosed With   
  
 Codes Comments IDDM (insulin dependent diabetes mellitus) ([facility]    -  Primary ICD-10-CM: E11.9, Z79.4 ICD-9-CM: 250.00, V58.67 Tobacco abuse     ICD-10-CM: Z72.0 ICD-9-CM: 305.1 Neuropathy     ICD-10-CM: G62.9 ICD-9-CM: 355.9 Bipolar disorder, in partial remission, most recent episode depressed ([facility]     ICD-10-CM: F31.75 ICD-9-CM: 296.55 Heart burn     ICD-10-CM: R12 
ICD-9-CM: 787.1 Vitals BP Pulse Temp Resp Height(growth percentile) Weight(growth percentile)  114/72 (BP 1 Location: Right arm, BP Patient Position: Sitting) 91 98 °F (36.7 °C) (Oral) 16 5' 9\" (1.753 m) 141 lb 6.4 oz (64.1 kg) SpO2 BMI Smoking Status 98% 20.88 kg/m2 Current Some Day Smoker Vitals History BMI and BSA Data Body Mass Index Body Surface Area  
 20.88 kg/m 2 1.77 m 2 Preferred Pharmacy Pharmacy Name Phone [phone] Your Updated Medication List  
  
   
This list is accurate as of 9/10/18  1:55 PM.  Always use your most recent med list.  
  
  
  
  
 gabapentin 100 mg capsule Commonly known as:  NEURONTIN Take 2 Caps by mouth nightly. * LANTUS U-100 INSULIN 100 unit/mL injection Generic drug:  insulin glargine 46 Units by SubCUTAneous route daily. * insulin glargine 100 unit/mL injection Commonly known as:  LANTUS  
50 units daily * NovoLIN R Regular U-100 Insuln 100 unit/mL injection Generic drug:  insulin regular 2-10 Units by SubCUTAneous route See Admin Instructions. Take 3-4 times daily per sliding scale; Patient is not sure of exact scale.' * insulin regular 100 unit/mL injection Commonly known as:  NOVOLIN R, HUMULIN R  
8 units 3 times a day before meals. OTHER Glucometer from [employer]   These are over the counter and very affordable  
  
 pantoprazole 40 mg tablet Commonly known as:  PROTONIX Take 1 Tab by mouth Daily (before breakfast). TYLENOL EXTRA STRENGTH 500 mg tablet Generic drug:  acetaminophen Take 1,000 mg by mouth two (2) times daily as needed ('Pain/Headache'). * Notice: This list has 4 medication(s) that are the same as other medications prescribed for you. Read the directions carefully, and ask your doctor or other care provider to review them with you. Prescriptions Sent to Pharmacy Refills  
 insulin glargine (LANTUS) 100 unit/mL injection 0 Si units daily  Class: Normal  
 [facility] Ph #: [phone]  
 insulin regular (NOVOLIN R, HUMULIN R) 100 unit/mL injection 0 Si units 3 times a day before meals. Class: Normal  
 Pharmacy: [facility] [address] [facility], [address] [place] [address] Ph #: [phone] To-Do List   
 2018 11:00 AM  
  Appointment with Kieran Cummins at [facility] ([phone]) Introducing [facility] SERVICES! Dear Gisel Gallagher: 
Thank you for requesting a [employer] account. Our records indicate that you already have an active [employer] account. You can access your account anytime at [email or web address] Did you know that you can access your hospital and ER discharge instructions at any time in [employer]Me VI[employer]oke? You can also review all of your test results from your hospital stay or ER visit. Additional Information If you have questions, please visit the Frequently Asked Questions section of the ShowMe VI[employer] website at [email or web address] Remember, ShowMe VIdeoke is NOT to be used for urgent needs. For medical emergencies, dial 911. Now available from your iPhone and Android! Please provide this summary of care documentation to your next provider. Your primary care clinician is listed as NONE. If you have any questions after today's visit, please call [phone].

## 2018-09-10 NOTE — PROGRESS NOTES
Written by Galo Alexander, as dictated by Dr. Catalina Gilbert MD.    Pat Buitrago is a 30 y.o. male. HPI  The patient comes in today to establish care and has Type I diabetes. He was in the hospital on 09/02 for DKA and his BS was 1,060. He notes that he felt fine, went to sleep and woke up vomiting. He was then taken to this hospital. He was put on an insulin drip and was informed that his HA1c was 13.5%. He has been diabetic for 15 years and does not check his BS regularly. The patient notes that there may be a financial limitation for him to get his insulin. His mother takes the same insulin and he has been using hers. He takes 46 units of Lantus every morning. He is on a sliding scale for Novolin and checks his BS to determine his units. He notes that his BS have been running between 200 and 300 in the morning. He does not eat 3 meals regularly. The patient does not check his BP 3 times daily, as he only checks it when he is experiencing sxs. He notes that a few years ago he had 2 seizures because his BS dropped. He has BL neuropathy below the knees and takes 2 tablets gabapentin 100 mg, which is not providing much relief. The patient would like to increase his dosage as his pain is impacting his work. He was instructed to establish with a PCP and endocrinologist. The patient notes that he is trying to eat right, but sometimes he eats sweets. He prefers vegetables and salad to meat. Sometimes he has heartburn. His BP is good today at 114/72. He notes that his BS normally good, but goes up when he presents to the hospital.    He was seen by a psychiatrist for bipolar disorder, but is no longer seeing psychiatry. He notes that he sometimes gets depressed because of his diabetes. The patient notes that gabapentin has been causing him to have trouble sleeping. Prior to starting gabapentin he did not have issues sleeping.     He quit smoking after he was discharged from the hospital on 09/05. 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 He has a 4 month old daughter and a 13year old son. Patient Active Problem List   Diagnosis Code    HTN (hypertension) I10    Tobacco abuse Z72.0    Tachycardia R00.0    Diabetic neuropathy, type I diabetes mellitus ([facility] E10.40        Current Outpatient Prescriptions on File Prior to Visit   Medication Sig Dispense Refill    gabapentin (NEURONTIN) 100 mg capsule Take 2 Caps by mouth nightly. 60 Cap 1    pantoprazole (PROTONIX) 40 mg tablet Take 1 Tab by mouth Daily (before breakfast). 30 Tab 1    OTHER Glucometer from [address]      These are over the counter and very affordable 1 Units 1    acetaminophen (TYLENOL EXTRA STRENGTH) 500 mg tablet Take 1,000 mg by mouth two (2) times daily as needed ('Pain/Headache').  insulin regular (NOVOLIN R) 100 unit/mL injection 2-10 Units by SubCUTAneous route See Admin Instructions. Take 3-4 times daily per sliding scale; Patient is not sure of exact scale.'      insulin glargine (LANTUS) 100 unit/mL injection 46 Units by SubCUTAneous route daily. No current facility-administered medications on file prior to visit.         Past Medical History:   Diagnosis Date    Bipolar 1 disorder, depressed ([facility]     Bipolar disorder ([address]     Depression     Diabetes ([address]     DKA, type 1 ([address] 1/27/2013    diagnosed age 23    H/O noncompliance with medical treatment, presenting hazards to health     MRSA (methicillin resistant staph aureus) culture positive     MRSA (methicillin resistant Staphylococcus aureus)     Face    Noncompliance with medication regimen     Second hand smoke exposure     Seizure ([address]     Seizures ([address] 2006 or 2007    one episode during prison       Past Surgical History:   Procedure Laterality Date    HX HEENT      top left wisdom tooth    HX ORTHOPAEDIC Left     wrist; [facility]       Family History   Problem Relation Age of Onset  Diabetes Mother     Diabetes Other      neice, type 1        Social History     Social History    Marital status: SINGLE     Spouse name: N/A    Number of children: N/A    Years of education: N/A     Occupational History    Not on file. Social History Main Topics    Smoking status: Current Some Day Smoker     Packs/day: 1.00     Years: 16.00     Types: Cigarettes    Smokeless tobacco: Never Used    Alcohol use No    Drug use: No    Sexual activity: Not on file     Other Topics Concern    Not on file     Social History Narrative    ** Merged History Encounter **            Review of Systems   Constitutional: Negative for malaise/fatigue. HENT: Negative for congestion. Eyes: Negative for blurred vision and pain. Respiratory: Negative for cough and shortness of breath. Cardiovascular: Negative for chest pain and palpitations. Gastrointestinal: Positive for heartburn. Negative for abdominal pain. Genitourinary: Negative for frequency and urgency. Musculoskeletal: Positive for joint pain. Negative for myalgias. Neurological: Positive for tingling and sensory change. Negative for dizziness, weakness and headaches. Psychiatric/Behavioral: Positive for depression and substance abuse. Negative for memory loss. Visit Vitals    /72 (BP 1 Location: Right arm, BP Patient Position: Sitting)    Pulse 91    Temp 98 °F (36.7 °C) (Oral)    Resp 16    Ht 5' 9\" (1.753 m)    Wt 141 lb 6.4 oz (64.1 kg)    SpO2 98%    BMI 20.88 kg/m2       Physical Exam   Constitutional: He is oriented to person, place, and time. He appears well-developed and well-nourished. No distress. HENT:   Right Ear: External ear normal.   Left Ear: External ear normal.   Eyes: Conjunctivae and EOM are normal. Right eye exhibits no discharge. Left eye exhibits no discharge. Neck: Normal range of motion. Neck supple. Cardiovascular: Normal rate and regular rhythm.     Pulmonary/Chest: Effort normal and breath sounds normal. He has no wheezes. Abdominal: Soft. Bowel sounds are normal. There is no tenderness. Lymphadenopathy:     He has no cervical adenopathy. Neurological: He is alert and oriented to person, place, and time. Skin: He is not diaphoretic. Psychiatric: He has a normal mood and affect. His behavior is normal.   Nursing note and vitals reviewed. ASSESSMENT and PLAN    ICD-10-CM ICD-9-CM    1. IDDM (insulin dependent diabetes mellitus) ([facility]) E11.9 250.00 insulin glargine (LANTUS) 100 unit/mL injection sent to pharmacy. Z79.4 V58.67 insulin regular (NOVOLIN R, HUMULIN R) 100 unit/mL injection sent to pharmacy. Lantus and Novolin prescribed. He should take Lantus 40 units in the morning and 10 units at night. He should take 8 units of Novolin before breakfast, lunch, and dinner. I encouraged him to eat breakfast. I want him to check his fasting BS at different times during different days to give him an idea of when his BS is highest. He should record his readings and follow-up with me in 2 weeks. 2. Tobacco abuse Z72.0 305.1 Encouraged to keep off of cigarettes. 3. Neuropathy G62.9 355.9 He should take 3 tablets gabapentin 100 mg. If his pain is bad during the day, he can take 2 tablets during the day. Discussed that if gabapentin is keeping him up, he should take it during the day. 4. Bipolar disorder, in partial remission, most recent episode depressed ([facility] F31.75 296.55 No tx given at this time. He is not seeing a psychiatrist.   5. Heart burn R12 787.1 Continue protonix. Encouraged to maintain a healthy diet. This plan was reviewed with the patient and patient agrees. All questions were answered. This scribe documentation was reviewed by me and accurately reflects the examination and decisions made by me. This note will not be viewable in [address]

## 2018-09-10 NOTE — PROGRESS NOTES
Eye Exam 2.5 years ago. Patient was in ER for blood sugar over 1000.0    Chief Complaint   Patient presents with   [facility] Establish Care    Foot Pain     1 year     Visit Vitals    /72 (BP 1 Location: Right arm, BP Patient Position: Sitting)    Pulse 91    Temp 98 °F (36.7 °C) (Oral)    Resp 16    Ht 5' 9\" (1.753 m)    Wt 141 lb 6.4 oz (64.1 kg)    SpO2 98%    BMI 20.88 kg/m2     1. Have you been to the ER, urgent care clinic since your last visit? Hospitalized since your last visit?09/02/18 blood sugar elevated    2. Have you seen or consulted any other health care providers outside of the [address] [place] [address] since your last visit? Include any pap smears or colon screening.  no

## 2018-09-21 ENCOUNTER — HOSPITAL ENCOUNTER (EMERGENCY)
Age: 38
Discharge: HOME | End: 2018-09-21
Attending: EMERGENCY MEDICINE
Payer: SELF-PAY

## 2018-09-21 VITALS
HEART RATE: 95 BPM | SYSTOLIC BLOOD PRESSURE: 137 MMHG | OXYGEN SATURATION: 100 % | BODY MASS INDEX: 20.85 KG/M2 | RESPIRATION RATE: 12 BRPM | TEMPERATURE: 98.1 F | HEIGHT: 68 IN | WEIGHT: 137.57 LBS | DIASTOLIC BLOOD PRESSURE: 85 MMHG

## 2018-09-21 DIAGNOSIS — L03.031 CELLULITIS OF TOE OF RIGHT FOOT: Primary | ICD-10-CM

## 2018-09-21 PROCEDURE — 99282 EMERGENCY DEPT VISIT SF MDM: CPT

## 2018-09-21 RX ORDER — CEPHALEXIN 500 MG/1
500 CAPSULE ORAL 3 TIMES DAILY
Qty: 21 CAP | Refills: 0 | Status: SHIPPED | OUTPATIENT
Start: 2018-09-21 | End: 2018-10-01

## 2018-09-21 NOTE — DISCHARGE INSTRUCTIONS
Cellulitis: Care Instructions  Your Care Instructions    Cellulitis is a skin infection caused by bacteria, most often strep or staph. It often occurs after a break in the skin from a scrape, cut, bite, or puncture, or after a rash. Cellulitis may be treated without doing tests to find out what caused it. But your doctor may do tests, if needed, to look for a specific bacteria, like methicillin-resistant Staphylococcus aureus (MRSA). The doctor has checked you carefully, but problems can develop later. If you notice any problems or new symptoms, get medical treatment right away. Follow-up care is a key part of your treatment and safety. Be sure to make and go to all appointments, and call your doctor if you are having problems. It's also a good idea to know your test results and keep a list of the medicines you take. How can you care for yourself at home? · Take your antibiotics as directed. Do not stop taking them just because you feel better. You need to take the full course of antibiotics. · Prop up the infected area on pillows to reduce pain and swelling. Try to keep the area above the level of your heart as often as you can. · If your doctor told you how to care for your wound, follow your doctor's instructions. If you did not get instructions, follow this general advice:  ¨ Wash the wound with clean water 2 times a day. Don't use hydrogen peroxide or alcohol, which can slow healing. ¨ You may cover the wound with a thin layer of petroleum jelly, such as Vaseline, and a nonstick bandage. ¨ Apply more petroleum jelly and replace the bandage as needed. · Be safe with medicines. Take pain medicines exactly as directed. ¨ If the doctor gave you a prescription medicine for pain, take it as prescribed. ¨ If you are not taking a prescription pain medicine, ask your doctor if you can take an over-the-counter medicine.   To prevent cellulitis in the future  · Try to prevent cuts, scrapes, or other injuries to your skin. Cellulitis most often occurs where there is a break in the skin. · If you get a scrape, cut, mild burn, or bite, wash the wound with clean water as soon as you can to help avoid infection. Don't use hydrogen peroxide or alcohol, which can slow healing. · If you have swelling in your legs (edema), support stockings and good skin care may help prevent leg sores and cellulitis. · Take care of your feet, especially if you have diabetes or other conditions that increase the risk of infection. Wear shoes and socks. Do not go barefoot. If you have athlete's foot or other skin problems on your feet, talk to your doctor about how to treat them. When should you call for help? Call your doctor now or seek immediate medical care if:    · You have signs that your infection is getting worse, such as:  ¨ Increased pain, swelling, warmth, or redness. ¨ Red streaks leading from the area. ¨ Pus draining from the area. ¨ A fever.     · You get a rash.    Watch closely for changes in your health, and be sure to contact your doctor if:    · You do not get better as expected. Where can you learn more? Go to http://dwaine-sanjeev.info/. Lakshmi Hayward in the search box to learn more about \"Cellulitis: Care Instructions. \"  Current as of: May 10, 2017  Content Version: 11.7  © 0837-3768 [employer]. Care instructions adapted under license by [facility] (which disclaims liability or warranty for this information). If you have questions about a medical condition or this instruction, always ask your healthcare professional. [place] [address] any warranty or liability for your use of this information.

## 2018-09-21 NOTE — ED NOTES
Patient discharged by MANUEL Jansen. Patient provided with discharge instructions Rx and instructions on follow up care. Patient out of ED ambulatory accompanied by family.

## 2018-09-21 NOTE — ED PROVIDER NOTES
EMERGENCY DEPARTMENT HISTORY AND PHYSICAL EXAM    Date: 9/21/2018  Patient Name: Savannah Clark    History of Presenting Illness     Chief Complaint   Patient presents with    Foot Pain     Pt has small wound to right foot x 1 day, pt is diabetic, wound is open between 2nd and 3rd toe on right foot. area is red on exam.          History Provided By: Patient        HPI: Savannah Clark is a 30 y.o. male with a PMH of diabetes who presents with cc of area of erythema to his 2nd and 3rd toe yesterday. Pt admits to pain and Tenderness upon palpation. Pt is unaware if he was bit by an insect and denies any laceration or trauma to the area. He states he keeps a close eye on his feet since he is a diabetic and came in as soon as he noted the pain and redness worsening. Pt noted clear drainage from the wound this am. Pt has not self medicated. He specifically denies any fevers, chills, abdominal pain, paresthesia, numbness, weakness of extremities, nausea, vomiting, chest pain, shortness of breath, headache, rash, diarrhea, sweating or weight loss. All other ROS negative at this time  Pt is in no acute distress and is speaking in full sentences        PCP: None    Current Outpatient Prescriptions   Medication Sig Dispense Refill    cephALEXin (KEFLEX) 500 mg capsule Take 1 Cap by mouth three (3) times daily for 10 days. 21 Cap 0    insulin glargine (LANTUS) 100 unit/mL injection 50 units daily 1 Vial 0    insulin regular (NOVOLIN R, HUMULIN R) 100 unit/mL injection 8 units 3 times a day before meals. 1 Vial 0    gabapentin (NEURONTIN) 100 mg capsule Take 2 Caps by mouth nightly. 60 Cap 1    pantoprazole (PROTONIX) 40 mg tablet Take 1 Tab by mouth Daily (before breakfast).  30 Tab 1    OTHER Glucometer from [address]      These are over the counter and very affordable 1 Units 1    acetaminophen (TYLENOL EXTRA STRENGTH) 500 mg tablet Take 1,000 mg by mouth two (2) times daily as needed ('Pain/Headache').  insulin regular (NOVOLIN R) 100 unit/mL injection 2-10 Units by SubCUTAneous route See Admin Instructions. Take 3-4 times daily per sliding scale; Patient is not sure of exact scale.'      insulin glargine (LANTUS) 100 unit/mL injection 46 Units by SubCUTAneous route daily. Past History     Past Medical History:  Past Medical History:   Diagnosis Date    B[facility] 1 disorder, depressed ([facility]     Bipolar disorder ([facility]     Depression     Diabetes ([facility]     DKA, type 1 ([address] 1/27/2013    diagnosed age 23    H/O noncompliance with medical treatment, presenting hazards to health     MRSA (methicillin resistant staph aureus) culture positive     MRSA (methicillin resistant Staphylococcus aureus)     Face    Noncompliance with medication regimen     Second hand smoke exposure     Seizure ([facility]     Seizures ([facility] 2006 or 2007    one episode during [facility]       Past Surgical History:  Past Surgical History:   Procedure Laterality Date    HX HEENT      top left wisdom tooth    HX ORTHOPAEDIC Left     wrist; [facility]       Family History:  Family History   Problem Relation Age of Onset    Diabetes Mother     Diabetes Other      neice, type 1        Social History:  Social History   Substance Use Topics    Smoking status: Current Some Day Smoker     Packs/day: 0.10     Years: 16.00     Types: Cigarettes    Smokeless tobacco: Never Used    Alcohol use No       Allergies:  No Known Allergies      Review of Systems   Review of Systems   Constitutional: Negative. Negative for chills and fever. HENT: Negative. Eyes: Negative. Respiratory: Negative. Negative for shortness of breath. Cardiovascular: Negative. Negative for chest pain. Gastrointestinal: Negative. Negative for abdominal pain, diarrhea, nausea and vomiting. Endocrine: Negative. Genitourinary: Negative. Musculoskeletal: Negative. Skin: Positive for color change and wound.    Allergic/Immunologic: Negative. Neurological: Negative. Negative for headaches. Hematological: Negative. Psychiatric/Behavioral: Negative. All other systems reviewed and are negative. Physical Exam     Vitals:    09/21/18 0930   BP: 137/85   Pulse: 95   Resp: 12   Temp: 98.1 °F (36.7 °C)   SpO2: 100%   Weight: 62.4 kg (137 lb 9.1 oz)   Height: 5' 8\" (1.727 m)     Physical Exam   Constitutional: He is oriented to person, place, and time. He appears well-developed and well-nourished. No distress. HENT:   Head: Normocephalic and atraumatic. Right Ear: External ear normal.   Left Ear: External ear normal.   Nose: Nose normal.   Mouth/Throat: Oropharynx is clear and moist.   Eyes: Conjunctivae and EOM are normal. Pupils are equal, round, and reactive to light. Neck: Normal range of motion. Neck supple. Cardiovascular: Normal rate, regular rhythm, normal heart sounds and intact distal pulses. Pulmonary/Chest: Effort normal and breath sounds normal. No respiratory distress. He has no wheezes. He has no rales. Abdominal: Soft. Bowel sounds are normal. He exhibits no distension. There is no tenderness. There is no rebound, no guarding, no CVA tenderness, no tenderness at McBurney's point and negative Drake's sign. Musculoskeletal: Normal range of motion. He exhibits tenderness. He exhibits no edema or deformity. Right ankle: Normal. He exhibits normal range of motion, no swelling, no ecchymosis, no deformity, no laceration and normal pulse. No tenderness. Left ankle: Normal. He exhibits normal range of motion, no swelling, no ecchymosis, no deformity, no laceration and normal pulse. No tenderness. Right foot: There is tenderness. There is normal range of motion, no bony tenderness, no swelling, normal capillary refill, no crepitus, no deformity and no laceration.         Left foot: Normal. There is normal range of motion, no tenderness, no bony tenderness, no swelling, normal capillary refill, no crepitus, no deformity and no laceration. Feet:    Lymphadenopathy:     He has no cervical adenopathy. Neurological: He is alert and oriented to person, place, and time. He has normal reflexes. No cranial nerve deficit. He exhibits normal muscle tone. Coordination normal.   Skin: No rash noted. He is not diaphoretic. Psychiatric: He has a normal mood and affect. His behavior is normal. Judgment and thought content normal.   Nursing note and vitals reviewed. Diagnostic Study Results     Labs -   No results found for this or any previous visit (from the past 12 hour(s)). Radiologic Studies -   No orders to display     CT Results  (Last 48 hours)    None        CXR Results  (Last 48 hours)    None            Medical Decision Making   I am the first provider for this patient. I reviewed the vital signs, available nursing notes, past medical history, past surgical history, family history and social history. Vital Signs-Reviewed the patient's vital signs. Records Reviewed: Nursing Notes, Old Medical Records, Previous Radiology Studies and Previous Laboratory Studies    ED Course:     Disposition:  discharge    DISCHARGE NOTE:   Care plan outlined and precautions discussed. Patient has no new complaints, changes, or physical findings. Results of visit were reviewed with the patient. All medications were reviewed with the patient; will d/c home. All of pt's questions and concerns were addressed. Patient was instructed and agrees to follow up with pcp, as well as to return to the ED upon further deterioration. Patient is ready to go home.       Follow-up Information     Follow up With Details Comments [address] [facility] Schedule an appointment as soon as possible for a visit in 3 days  [address] [facility] [address] [facility]  [phone]          Current Discharge Medication List      START taking these medications    Details   cephALEXin (Mongolian Rota) 500 mg capsule Take 1 Cap by mouth three (3) times daily for 10 days. Qty: 21 Cap, Refills: 0             Provider Notes (Medical Decision Making):   No fevers, no signs of abscess, mild erythema and tenderness will treat with abx  Area marked with ball point pen     Worsening si/sxs discussed extensively ie fevers, expanding erythema. .. Follow up with PCP or RTC if symptoms/signs worsen  Side effects of medication discussed  Education materials provided at discharge   Pt verbalizes agreement with plan      Procedures        Diagnosis     Clinical Impression:   1.  Cellulitis of toe of right foot

## 2018-09-21 NOTE — LETTER
[address] 
[facility] EMERGENCY DEPT 
[address] [place] [address] [address] [id number] [phone] Work/School Note Date: 9/21/2018 To Whom It May concern: Mitesh Flynn was seen and treated today in the emergency room by the following provider(s): 
Attending Provider: Maeve Feliz MD 
Physician Assistant: MANUEL Liriano. Mitesh Flynn may return to work on 9/23/18 Sincerely, MANUEL Liriano

## 2018-09-24 ENCOUNTER — PATIENT OUTREACH (OUTPATIENT)
Dept: PRIMARY CARE CLINIC | Age: 38
End: 2018-09-24

## 2018-09-24 NOTE — PROGRESS NOTES
NN placed call to patient per PCP request to follow up as he did not attend ED follow up with PCP today as scheduled. He is noted to have uncontrolled DM and NN also to offer any needed resources. NN unable to reach the patient and left VM requesting a return call. NN's contact info provided.

## 2018-11-18 ENCOUNTER — HOSPITAL ENCOUNTER (INPATIENT)
Age: 38
LOS: 2 days | Discharge: HOME | DRG: 637 | End: 2018-11-20
Attending: EMERGENCY MEDICINE | Admitting: INTERNAL MEDICINE
Payer: SELF-PAY

## 2018-11-18 DIAGNOSIS — R11.10 ACUTE VOMITING: ICD-10-CM

## 2018-11-18 DIAGNOSIS — R65.10 SIRS (SYSTEMIC INFLAMMATORY RESPONSE SYNDROME) (HCC): ICD-10-CM

## 2018-11-18 DIAGNOSIS — K92.2 ACUTE UPPER GI BLEED: ICD-10-CM

## 2018-11-18 DIAGNOSIS — E87.20 METABOLIC ACIDOSIS: ICD-10-CM

## 2018-11-18 DIAGNOSIS — E10.10 DIABETIC KETOACIDOSIS WITHOUT COMA ASSOCIATED WITH TYPE 1 DIABETES MELLITUS (HCC): Primary | ICD-10-CM

## 2018-11-18 PROBLEM — G62.9 NEUROPATHY: Status: ACTIVE | Noted: 2018-11-18

## 2018-11-18 LAB
ADMINISTERED INITIALS, ADMINIT: NORMAL
ALBUMIN SERPL-MCNC: 3.9 G/DL (ref 3.5–5)
ALBUMIN/GLOB SERPL: 1 {RATIO} (ref 1.1–2.2)
ALP SERPL-CCNC: 155 U/L (ref 45–117)
ALT SERPL-CCNC: 46 U/L (ref 12–78)
ANION GAP SERPL CALC-SCNC: 22 MMOL/L (ref 5–15)
APPEARANCE UR: CLEAR
AST SERPL-CCNC: 22 U/L (ref 15–37)
BACTERIA URNS QL MICRO: NEGATIVE /HPF
BASE DEFICIT BLDV-SCNC: 15.6 MMOL/L
BASOPHILS # BLD: 0 K/UL (ref 0–0.1)
BASOPHILS NFR BLD: 0 % (ref 0–1)
BDY SITE: ABNORMAL
BILIRUB SERPL-MCNC: 0.5 MG/DL (ref 0.2–1)
BILIRUB UR QL: NEGATIVE
BREATHS.SPONTANEOUS ON VENT: 26
BUN SERPL-MCNC: 19 MG/DL (ref 6–20)
BUN/CREAT SERPL: 16 (ref 12–20)
CALCIUM SERPL-MCNC: 9 MG/DL (ref 8.5–10.1)
CHLORIDE SERPL-SCNC: 104 MMOL/L (ref 97–108)
CO2 SERPL-SCNC: 13 MMOL/L (ref 21–32)
COLOR UR: ABNORMAL
CREAT SERPL-MCNC: 1.17 MG/DL (ref 0.7–1.3)
D50 ADMINISTERED, D50ADM: 0 ML
D50 ORDER, D50ORD: 0 ML
DIFFERENTIAL METHOD BLD: ABNORMAL
EOSINOPHIL # BLD: 0.1 K/UL (ref 0–0.4)
EOSINOPHIL NFR BLD: 1 % (ref 0–7)
EPITH CASTS URNS QL MICRO: ABNORMAL /LPF
ERYTHROCYTE [DISTWIDTH] IN BLOOD BY AUTOMATED COUNT: 12.7 % (ref 11.5–14.5)
GLOBULIN SER CALC-MCNC: 3.8 G/DL (ref 2–4)
GLSCOM COMMENTS: NORMAL
GLUCOSE BLD STRIP.AUTO-MCNC: 224 MG/DL (ref 65–100)
GLUCOSE SERPL-MCNC: 298 MG/DL (ref 65–100)
GLUCOSE UR STRIP.AUTO-MCNC: >1000 MG/DL
GLUCOSE, GLC: 231 MG/DL
HCO3 BLDV-SCNC: 12 MMOL/L (ref 23–28)
HCT VFR BLD AUTO: 44.6 % (ref 36.6–50.3)
HGB BLD-MCNC: 15.8 G/DL (ref 12.1–17)
HGB UR QL STRIP: ABNORMAL
HIGH TARGET, HITG: 250 MG/DL
HYALINE CASTS URNS QL MICRO: ABNORMAL /LPF (ref 0–5)
IMM GRANULOCYTES # BLD: 0 K/UL (ref 0–0.04)
IMM GRANULOCYTES NFR BLD AUTO: 0 % (ref 0–0.5)
INSULIN ADMINSTERED, INSADM: 3.4 UNITS/HOUR
INSULIN ORDER, INSORD: 3.4 UNITS/HOUR
KETONES UR QL STRIP.AUTO: >80 MG/DL
LACTATE SERPL-SCNC: 1.1 MMOL/L (ref 0.4–2)
LEUKOCYTE ESTERASE UR QL STRIP.AUTO: NEGATIVE
LOW TARGET, LOT: 150 MG/DL
LYMPHOCYTES # BLD: 1.9 K/UL (ref 0.8–3.5)
LYMPHOCYTES NFR BLD: 15 % (ref 12–49)
MCH RBC QN AUTO: 32 PG (ref 26–34)
MCHC RBC AUTO-ENTMCNC: 35.4 G/DL (ref 30–36.5)
MCV RBC AUTO: 90.5 FL (ref 80–99)
MINUTES UNTIL NEXT BG, NBG: 60 MIN
MONOCYTES # BLD: 0.7 K/UL (ref 0–1)
MONOCYTES NFR BLD: 6 % (ref 5–13)
MULTIPLIER, MUL: 0.02
NEUTS SEG # BLD: 9.5 K/UL (ref 1.8–8)
NEUTS SEG NFR BLD: 78 % (ref 32–75)
NITRITE UR QL STRIP.AUTO: NEGATIVE
NRBC # BLD: 0 K/UL (ref 0–0.01)
NRBC BLD-RTO: 0 PER 100 WBC
ORDER INITIALS, ORDINIT: NORMAL
PCO2 BLDV: 36 MMHG (ref 41–51)
PH BLDV: 7.15 [PH] (ref 7.32–7.42)
PH UR STRIP: 5.5 [PH] (ref 5–8)
PLATELET # BLD AUTO: 340 K/UL (ref 150–400)
PMV BLD AUTO: 10.4 FL (ref 8.9–12.9)
PO2 BLDV: 37 MMHG (ref 25–40)
POTASSIUM SERPL-SCNC: 4.3 MMOL/L (ref 3.5–5.1)
PROT SERPL-MCNC: 7.7 G/DL (ref 6.4–8.2)
PROT UR STRIP-MCNC: 100 MG/DL
RBC # BLD AUTO: 4.93 M/UL (ref 4.1–5.7)
RBC #/AREA URNS HPF: ABNORMAL /HPF (ref 0–5)
SAO2 % BLDV: 56 % (ref 65–88)
SAO2% DEVICE SAO2% SENSOR NAME: ABNORMAL
SERVICE CMNT-IMP: ABNORMAL
SERVICE CMNT-IMP: ABNORMAL
SODIUM SERPL-SCNC: 139 MMOL/L (ref 136–145)
SP GR UR REFRACTOMETRY: 1.03 (ref 1–1.03)
SPECIMEN SITE: ABNORMAL
UA: UC IF INDICATED,UAUC: ABNORMAL
UROBILINOGEN UR QL STRIP.AUTO: 0.2 EU/DL (ref 0.2–1)
WBC # BLD AUTO: 12.1 K/UL (ref 4.1–11.1)
WBC URNS QL MICRO: ABNORMAL /HPF (ref 0–4)

## 2018-11-18 PROCEDURE — 83735 ASSAY OF MAGNESIUM: CPT

## 2018-11-18 PROCEDURE — 83605 ASSAY OF LACTIC ACID: CPT

## 2018-11-18 PROCEDURE — 85025 COMPLETE CBC W/AUTO DIFF WBC: CPT

## 2018-11-18 PROCEDURE — 84100 ASSAY OF PHOSPHORUS: CPT

## 2018-11-18 PROCEDURE — 93005 ELECTROCARDIOGRAM TRACING: CPT

## 2018-11-18 PROCEDURE — 80053 COMPREHEN METABOLIC PANEL: CPT

## 2018-11-18 PROCEDURE — 96375 TX/PRO/DX INJ NEW DRUG ADDON: CPT

## 2018-11-18 PROCEDURE — 81001 URINALYSIS AUTO W/SCOPE: CPT

## 2018-11-18 PROCEDURE — 96374 THER/PROPH/DIAG INJ IV PUSH: CPT

## 2018-11-18 PROCEDURE — 74011000250 HC RX REV CODE- 250: Performed by: INTERNAL MEDICINE

## 2018-11-18 PROCEDURE — 80307 DRUG TEST PRSMV CHEM ANLYZR: CPT

## 2018-11-18 PROCEDURE — 74011000258 HC RX REV CODE- 258: Performed by: EMERGENCY MEDICINE

## 2018-11-18 PROCEDURE — 74011636637 HC RX REV CODE- 636/637: Performed by: EMERGENCY MEDICINE

## 2018-11-18 PROCEDURE — 96361 HYDRATE IV INFUSION ADD-ON: CPT

## 2018-11-18 PROCEDURE — 82962 GLUCOSE BLOOD TEST: CPT

## 2018-11-18 PROCEDURE — 65660000000 HC RM CCU STEPDOWN

## 2018-11-18 PROCEDURE — 74011000250 HC RX REV CODE- 250: Performed by: EMERGENCY MEDICINE

## 2018-11-18 PROCEDURE — C9113 INJ PANTOPRAZOLE SODIUM, VIA: HCPCS | Performed by: INTERNAL MEDICINE

## 2018-11-18 PROCEDURE — 36415 COLL VENOUS BLD VENIPUNCTURE: CPT

## 2018-11-18 PROCEDURE — 99284 EMERGENCY DEPT VISIT MOD MDM: CPT

## 2018-11-18 PROCEDURE — 83036 HEMOGLOBIN GLYCOSYLATED A1C: CPT

## 2018-11-18 PROCEDURE — 74011250636 HC RX REV CODE- 250/636: Performed by: EMERGENCY MEDICINE

## 2018-11-18 PROCEDURE — 82803 BLOOD GASES ANY COMBINATION: CPT

## 2018-11-18 PROCEDURE — 74011250636 HC RX REV CODE- 250/636

## 2018-11-18 PROCEDURE — 74011250636 HC RX REV CODE- 250/636: Performed by: INTERNAL MEDICINE

## 2018-11-18 PROCEDURE — 74011000258 HC RX REV CODE- 258: Performed by: INTERNAL MEDICINE

## 2018-11-18 RX ORDER — SODIUM BICARBONATE 1 MEQ/ML
50 SYRINGE (ML) INTRAVENOUS
Status: DISCONTINUED | OUTPATIENT
Start: 2018-11-18 | End: 2018-11-18

## 2018-11-18 RX ORDER — SODIUM CHLORIDE 0.9 % (FLUSH) 0.9 %
5-10 SYRINGE (ML) INJECTION EVERY 8 HOURS
Status: DISCONTINUED | OUTPATIENT
Start: 2018-11-19 | End: 2018-11-20 | Stop reason: HOSPADM

## 2018-11-18 RX ORDER — ONDANSETRON 2 MG/ML
4 INJECTION INTRAMUSCULAR; INTRAVENOUS
Status: DISCONTINUED | OUTPATIENT
Start: 2018-11-18 | End: 2018-11-20 | Stop reason: HOSPADM

## 2018-11-18 RX ORDER — INSULIN LISPRO 100 [IU]/ML
INJECTION, SOLUTION INTRAVENOUS; SUBCUTANEOUS
Status: DISCONTINUED | OUTPATIENT
Start: 2018-11-19 | End: 2018-11-19

## 2018-11-18 RX ORDER — SODIUM BICARBONATE 1 MEQ/ML
50 SYRINGE (ML) INTRAVENOUS
Status: COMPLETED | OUTPATIENT
Start: 2018-11-18 | End: 2018-11-18

## 2018-11-18 RX ORDER — MAGNESIUM SULFATE 100 %
4 CRYSTALS MISCELLANEOUS AS NEEDED
Status: DISCONTINUED | OUTPATIENT
Start: 2018-11-18 | End: 2018-11-19

## 2018-11-18 RX ORDER — GABAPENTIN 100 MG/1
200 CAPSULE ORAL
Status: DISCONTINUED | OUTPATIENT
Start: 2018-11-19 | End: 2018-11-20 | Stop reason: HOSPADM

## 2018-11-18 RX ORDER — ONDANSETRON 2 MG/ML
4 INJECTION INTRAMUSCULAR; INTRAVENOUS
Status: COMPLETED | OUTPATIENT
Start: 2018-11-18 | End: 2018-11-18

## 2018-11-18 RX ORDER — ACETAMINOPHEN 325 MG/1
650 TABLET ORAL
Status: DISCONTINUED | OUTPATIENT
Start: 2018-11-18 | End: 2018-11-20 | Stop reason: HOSPADM

## 2018-11-18 RX ORDER — ONDANSETRON 2 MG/ML
INJECTION INTRAMUSCULAR; INTRAVENOUS
Status: COMPLETED
Start: 2018-11-18 | End: 2018-11-18

## 2018-11-18 RX ORDER — DEXTROSE 50 % IN WATER (D50W) INTRAVENOUS SYRINGE
25-50 AS NEEDED
Status: DISCONTINUED | OUTPATIENT
Start: 2018-11-18 | End: 2018-11-19

## 2018-11-18 RX ORDER — SODIUM CHLORIDE 0.9 % (FLUSH) 0.9 %
5-10 SYRINGE (ML) INJECTION AS NEEDED
Status: DISCONTINUED | OUTPATIENT
Start: 2018-11-18 | End: 2018-11-20 | Stop reason: HOSPADM

## 2018-11-18 RX ORDER — DEXTROSE MONOHYDRATE AND SODIUM CHLORIDE 5; .9 G/100ML; G/100ML
100 INJECTION, SOLUTION INTRAVENOUS CONTINUOUS
Status: DISCONTINUED | OUTPATIENT
Start: 2018-11-18 | End: 2018-11-19

## 2018-11-18 RX ADMIN — DEXTROSE MONOHYDRATE AND SODIUM CHLORIDE 150 ML/HR: 5; .9 INJECTION, SOLUTION INTRAVENOUS at 23:25

## 2018-11-18 RX ADMIN — ONDANSETRON 4 MG: 2 INJECTION INTRAMUSCULAR; INTRAVENOUS at 23:27

## 2018-11-18 RX ADMIN — SODIUM BICARBONATE: 84 INJECTION, SOLUTION INTRAVENOUS at 23:37

## 2018-11-18 RX ADMIN — SODIUM CHLORIDE 3.4 UNITS/HR: 900 INJECTION, SOLUTION INTRAVENOUS at 23:59

## 2018-11-18 RX ADMIN — SODIUM BICARBONATE 50 MEQ: 84 INJECTION, SOLUTION INTRAVENOUS at 21:27

## 2018-11-18 RX ADMIN — SODIUM CHLORIDE 1000 ML: 900 INJECTION, SOLUTION INTRAVENOUS at 21:27

## 2018-11-18 RX ADMIN — SODIUM CHLORIDE 40 MG: 9 INJECTION, SOLUTION INTRAMUSCULAR; INTRAVENOUS; SUBCUTANEOUS at 23:27

## 2018-11-18 RX ADMIN — ONDANSETRON 4 MG: 2 INJECTION, SOLUTION INTRAMUSCULAR; INTRAVENOUS at 20:13

## 2018-11-19 LAB
ADMINISTERED INITIALS, ADMINIT: NORMAL
ALBUMIN SERPL-MCNC: 3.3 G/DL (ref 3.5–5)
ALBUMIN/GLOB SERPL: 1 {RATIO} (ref 1.1–2.2)
ALP SERPL-CCNC: 124 U/L (ref 45–117)
ALT SERPL-CCNC: 36 U/L (ref 12–78)
AMPHET UR QL SCN: NEGATIVE
ANION GAP SERPL CALC-SCNC: 12 MMOL/L (ref 5–15)
ANION GAP SERPL CALC-SCNC: 19 MMOL/L (ref 5–15)
ANION GAP SERPL CALC-SCNC: 8 MMOL/L (ref 5–15)
ANION GAP SERPL CALC-SCNC: 9 MMOL/L (ref 5–15)
AST SERPL-CCNC: 15 U/L (ref 15–37)
ATRIAL RATE: 110 BPM
BARBITURATES UR QL SCN: NEGATIVE
BASOPHILS # BLD: 0 K/UL (ref 0–0.1)
BASOPHILS NFR BLD: 0 % (ref 0–1)
BENZODIAZ UR QL: NEGATIVE
BILIRUB SERPL-MCNC: 0.4 MG/DL (ref 0.2–1)
BUN SERPL-MCNC: 13 MG/DL (ref 6–20)
BUN SERPL-MCNC: 16 MG/DL (ref 6–20)
BUN SERPL-MCNC: 16 MG/DL (ref 6–20)
BUN SERPL-MCNC: 17 MG/DL (ref 6–20)
BUN/CREAT SERPL: 14 (ref 12–20)
BUN/CREAT SERPL: 16 (ref 12–20)
BUN/CREAT SERPL: 17 (ref 12–20)
BUN/CREAT SERPL: 17 (ref 12–20)
CALCIUM SERPL-MCNC: 8.2 MG/DL (ref 8.5–10.1)
CALCIUM SERPL-MCNC: 8.4 MG/DL (ref 8.5–10.1)
CALCIUM SERPL-MCNC: 8.4 MG/DL (ref 8.5–10.1)
CALCIUM SERPL-MCNC: 8.6 MG/DL (ref 8.5–10.1)
CALCULATED P AXIS, ECG09: 79 DEGREES
CALCULATED R AXIS, ECG10: 104 DEGREES
CALCULATED T AXIS, ECG11: 71 DEGREES
CANNABINOIDS UR QL SCN: NEGATIVE
CHLORIDE SERPL-SCNC: 107 MMOL/L (ref 97–108)
CHLORIDE SERPL-SCNC: 110 MMOL/L (ref 97–108)
CHLORIDE SERPL-SCNC: 114 MMOL/L (ref 97–108)
CHLORIDE SERPL-SCNC: 114 MMOL/L (ref 97–108)
CO2 SERPL-SCNC: 14 MMOL/L (ref 21–32)
CO2 SERPL-SCNC: 18 MMOL/L (ref 21–32)
CO2 SERPL-SCNC: 23 MMOL/L (ref 21–32)
CO2 SERPL-SCNC: 25 MMOL/L (ref 21–32)
COCAINE UR QL SCN: NEGATIVE
CREAT SERPL-MCNC: 0.95 MG/DL (ref 0.7–1.3)
CREAT SERPL-MCNC: 0.96 MG/DL (ref 0.7–1.3)
CREAT SERPL-MCNC: 1.02 MG/DL (ref 0.7–1.3)
CREAT SERPL-MCNC: 1.02 MG/DL (ref 0.7–1.3)
D50 ADMINISTERED, D50ADM: 0 ML
D50 ORDER, D50ORD: 0 ML
DIAGNOSIS, 93000: NORMAL
DIFFERENTIAL METHOD BLD: ABNORMAL
DRUG SCRN COMMENT,DRGCM: NORMAL
EOSINOPHIL # BLD: 0 K/UL (ref 0–0.4)
EOSINOPHIL NFR BLD: 0 % (ref 0–7)
ERYTHROCYTE [DISTWIDTH] IN BLOOD BY AUTOMATED COUNT: 13.3 % (ref 11.5–14.5)
EST. AVERAGE GLUCOSE BLD GHB EST-MCNC: 346 MG/DL
GLOBULIN SER CALC-MCNC: 3.2 G/DL (ref 2–4)
GLSCOM COMMENTS: NORMAL
GLUCOSE BLD STRIP.AUTO-MCNC: 105 MG/DL (ref 65–100)
GLUCOSE BLD STRIP.AUTO-MCNC: 121 MG/DL (ref 65–100)
GLUCOSE BLD STRIP.AUTO-MCNC: 122 MG/DL (ref 65–100)
GLUCOSE BLD STRIP.AUTO-MCNC: 122 MG/DL (ref 65–100)
GLUCOSE BLD STRIP.AUTO-MCNC: 130 MG/DL (ref 65–100)
GLUCOSE BLD STRIP.AUTO-MCNC: 131 MG/DL (ref 65–100)
GLUCOSE BLD STRIP.AUTO-MCNC: 133 MG/DL (ref 65–100)
GLUCOSE BLD STRIP.AUTO-MCNC: 141 MG/DL (ref 65–100)
GLUCOSE BLD STRIP.AUTO-MCNC: 144 MG/DL (ref 65–100)
GLUCOSE BLD STRIP.AUTO-MCNC: 157 MG/DL (ref 65–100)
GLUCOSE BLD STRIP.AUTO-MCNC: 180 MG/DL (ref 65–100)
GLUCOSE BLD STRIP.AUTO-MCNC: 192 MG/DL (ref 65–100)
GLUCOSE BLD STRIP.AUTO-MCNC: 208 MG/DL (ref 65–100)
GLUCOSE BLD STRIP.AUTO-MCNC: 213 MG/DL (ref 65–100)
GLUCOSE BLD STRIP.AUTO-MCNC: 217 MG/DL (ref 65–100)
GLUCOSE BLD STRIP.AUTO-MCNC: 231 MG/DL (ref 65–100)
GLUCOSE SERPL-MCNC: 140 MG/DL (ref 65–100)
GLUCOSE SERPL-MCNC: 165 MG/DL (ref 65–100)
GLUCOSE SERPL-MCNC: 209 MG/DL (ref 65–100)
GLUCOSE SERPL-MCNC: 235 MG/DL (ref 65–100)
GLUCOSE, GLC: 121 MG/DL
GLUCOSE, GLC: 122 MG/DL
GLUCOSE, GLC: 122 MG/DL
GLUCOSE, GLC: 130 MG/DL
GLUCOSE, GLC: 131 MG/DL
GLUCOSE, GLC: 133 MG/DL
GLUCOSE, GLC: 141 MG/DL
GLUCOSE, GLC: 144 MG/DL
GLUCOSE, GLC: 180 MG/DL
GLUCOSE, GLC: 192 MG/DL
GLUCOSE, GLC: 208 MG/DL
GLUCOSE, GLC: 213 MG/DL
GLUCOSE, GLC: 217 MG/DL
HBA1C MFR BLD: 13.7 % (ref 4.2–6.3)
HCT VFR BLD AUTO: 36.3 % (ref 36.6–50.3)
HCT VFR BLD AUTO: 38.7 % (ref 36.6–50.3)
HGB BLD-MCNC: 13 G/DL (ref 12.1–17)
HGB BLD-MCNC: 13.6 G/DL (ref 12.1–17)
HIGH TARGET, HITG: 250 MG/DL
IMM GRANULOCYTES # BLD: 0 K/UL (ref 0–0.04)
IMM GRANULOCYTES NFR BLD AUTO: 0 % (ref 0–0.5)
INSULIN ADMINSTERED, INSADM: 0 UNITS/HOUR
INSULIN ADMINSTERED, INSADM: 0.7 UNITS/HOUR
INSULIN ADMINSTERED, INSADM: 2.4 UNITS/HOUR
INSULIN ADMINSTERED, INSADM: 2.6 UNITS/HOUR
INSULIN ADMINSTERED, INSADM: 3 UNITS/HOUR
INSULIN ADMINSTERED, INSADM: 3.1 UNITS/HOUR
INSULIN ADMINSTERED, INSADM: 3.1 UNITS/HOUR
INSULIN ORDER, INSORD: 0 UNITS/HOUR
INSULIN ORDER, INSORD: 0.7 UNITS/HOUR
INSULIN ORDER, INSORD: 2.4 UNITS/HOUR
INSULIN ORDER, INSORD: 2.6 UNITS/HOUR
INSULIN ORDER, INSORD: 3 UNITS/HOUR
INSULIN ORDER, INSORD: 3.1 UNITS/HOUR
INSULIN ORDER, INSORD: 3.1 UNITS/HOUR
LOW TARGET, LOT: 150 MG/DL
LYMPHOCYTES # BLD: 1.9 K/UL (ref 0.8–3.5)
LYMPHOCYTES NFR BLD: 17 % (ref 12–49)
MAGNESIUM SERPL-MCNC: 2.1 MG/DL (ref 1.6–2.4)
MAGNESIUM SERPL-MCNC: 2.1 MG/DL (ref 1.6–2.4)
MCH RBC QN AUTO: 32.5 PG (ref 26–34)
MCHC RBC AUTO-ENTMCNC: 35.1 G/DL (ref 30–36.5)
MCV RBC AUTO: 92.4 FL (ref 80–99)
METHADONE UR QL: NEGATIVE
MINUTES UNTIL NEXT BG, NBG: 120 MIN
MINUTES UNTIL NEXT BG, NBG: 60 MIN
MONOCYTES # BLD: 0.8 K/UL (ref 0–1)
MONOCYTES NFR BLD: 7 % (ref 5–13)
MULTIPLIER, MUL: 0
MULTIPLIER, MUL: 0.01
MULTIPLIER, MUL: 0.02
NEUTS SEG # BLD: 8.2 K/UL (ref 1.8–8)
NEUTS SEG NFR BLD: 75 % (ref 32–75)
NRBC # BLD: 0 K/UL (ref 0–0.01)
NRBC BLD-RTO: 0 PER 100 WBC
OPIATES UR QL: NEGATIVE
ORDER INITIALS, ORDINIT: NORMAL
P-R INTERVAL, ECG05: 146 MS
PCP UR QL: NEGATIVE
PHOSPHATE SERPL-MCNC: 3.2 MG/DL (ref 2.6–4.7)
PLATELET # BLD AUTO: 296 K/UL (ref 150–400)
PMV BLD AUTO: 10.9 FL (ref 8.9–12.9)
POTASSIUM SERPL-SCNC: 3.1 MMOL/L (ref 3.5–5.1)
POTASSIUM SERPL-SCNC: 3.5 MMOL/L (ref 3.5–5.1)
POTASSIUM SERPL-SCNC: 3.8 MMOL/L (ref 3.5–5.1)
POTASSIUM SERPL-SCNC: 4.4 MMOL/L (ref 3.5–5.1)
PROT SERPL-MCNC: 6.5 G/DL (ref 6.4–8.2)
Q-T INTERVAL, ECG07: 348 MS
QRS DURATION, ECG06: 94 MS
QTC CALCULATION (BEZET), ECG08: 470 MS
RBC # BLD AUTO: 4.19 M/UL (ref 4.1–5.7)
SERVICE CMNT-IMP: ABNORMAL
SODIUM SERPL-SCNC: 140 MMOL/L (ref 136–145)
SODIUM SERPL-SCNC: 143 MMOL/L (ref 136–145)
SODIUM SERPL-SCNC: 144 MMOL/L (ref 136–145)
SODIUM SERPL-SCNC: 146 MMOL/L (ref 136–145)
VENTRICULAR RATE, ECG03: 110 BPM
WBC # BLD AUTO: 10.9 K/UL (ref 4.1–11.1)

## 2018-11-19 PROCEDURE — 80048 BASIC METABOLIC PNL TOTAL CA: CPT

## 2018-11-19 PROCEDURE — 74011000258 HC RX REV CODE- 258: Performed by: INTERNAL MEDICINE

## 2018-11-19 PROCEDURE — 74011636637 HC RX REV CODE- 636/637: Performed by: EMERGENCY MEDICINE

## 2018-11-19 PROCEDURE — 85018 HEMOGLOBIN: CPT

## 2018-11-19 PROCEDURE — 80053 COMPREHEN METABOLIC PANEL: CPT

## 2018-11-19 PROCEDURE — 36415 COLL VENOUS BLD VENIPUNCTURE: CPT

## 2018-11-19 PROCEDURE — 65660000000 HC RM CCU STEPDOWN

## 2018-11-19 PROCEDURE — 82962 GLUCOSE BLOOD TEST: CPT

## 2018-11-19 PROCEDURE — 74011250637 HC RX REV CODE- 250/637: Performed by: EMERGENCY MEDICINE

## 2018-11-19 PROCEDURE — 83735 ASSAY OF MAGNESIUM: CPT

## 2018-11-19 PROCEDURE — 74011250636 HC RX REV CODE- 250/636: Performed by: INTERNAL MEDICINE

## 2018-11-19 PROCEDURE — 74011000250 HC RX REV CODE- 250: Performed by: INTERNAL MEDICINE

## 2018-11-19 PROCEDURE — 85025 COMPLETE CBC W/AUTO DIFF WBC: CPT

## 2018-11-19 PROCEDURE — C9113 INJ PANTOPRAZOLE SODIUM, VIA: HCPCS | Performed by: INTERNAL MEDICINE

## 2018-11-19 PROCEDURE — 74011250636 HC RX REV CODE- 250/636: Performed by: EMERGENCY MEDICINE

## 2018-11-19 RX ORDER — GABAPENTIN 100 MG/1
200 CAPSULE ORAL
Status: COMPLETED | OUTPATIENT
Start: 2018-11-19 | End: 2018-11-19

## 2018-11-19 RX ORDER — INSULIN GLARGINE 100 [IU]/ML
35 INJECTION, SOLUTION SUBCUTANEOUS DAILY
Status: DISCONTINUED | OUTPATIENT
Start: 2018-11-19 | End: 2018-11-20

## 2018-11-19 RX ORDER — SODIUM CHLORIDE 9 MG/ML
75 INJECTION, SOLUTION INTRAVENOUS CONTINUOUS
Status: DISCONTINUED | OUTPATIENT
Start: 2018-11-19 | End: 2018-11-20 | Stop reason: HOSPADM

## 2018-11-19 RX ORDER — HYDROCODONE BITARTRATE AND ACETAMINOPHEN 5; 325 MG/1; MG/1
1 TABLET ORAL
Status: DISCONTINUED | OUTPATIENT
Start: 2018-11-19 | End: 2018-11-20 | Stop reason: HOSPADM

## 2018-11-19 RX ADMIN — Medication 10 ML: at 13:51

## 2018-11-19 RX ADMIN — SODIUM CHLORIDE 40 MG: 9 INJECTION INTRAMUSCULAR; INTRAVENOUS; SUBCUTANEOUS at 23:16

## 2018-11-19 RX ADMIN — GABAPENTIN 200 MG: 100 CAPSULE ORAL at 18:34

## 2018-11-19 RX ADMIN — ONDANSETRON HYDROCHLORIDE 4 MG: 2 INJECTION, SOLUTION INTRAMUSCULAR; INTRAVENOUS at 10:58

## 2018-11-19 RX ADMIN — ONDANSETRON HYDROCHLORIDE 4 MG: 2 INJECTION, SOLUTION INTRAMUSCULAR; INTRAVENOUS at 07:14

## 2018-11-19 RX ADMIN — SODIUM CHLORIDE 40 MG: 9 INJECTION INTRAMUSCULAR; INTRAVENOUS; SUBCUTANEOUS at 10:57

## 2018-11-19 RX ADMIN — Medication 10 ML: at 00:02

## 2018-11-19 RX ADMIN — SODIUM CHLORIDE 75 ML/HR: 900 INJECTION, SOLUTION INTRAVENOUS at 19:02

## 2018-11-19 RX ADMIN — DEXTROSE MONOHYDRATE AND SODIUM CHLORIDE 150 ML/HR: 5; .9 INJECTION, SOLUTION INTRAVENOUS at 13:51

## 2018-11-19 RX ADMIN — INSULIN GLARGINE 35 UNITS: 100 INJECTION, SOLUTION SUBCUTANEOUS at 16:49

## 2018-11-19 RX ADMIN — HYDROCODONE BITARTRATE AND ACETAMINOPHEN 1 TABLET: 5; 325 TABLET ORAL at 20:47

## 2018-11-19 NOTE — ED PROVIDER NOTES
EMERGENCY DEPARTMENT HISTORY AND PHYSICAL EXAM      Date: 11/18/2018  Patient Name: Cathi Neumann  History of Presenting Illness     Chief Complaint   Patient presents with    Lethargy    Vomiting     History Provided By: Patient    HPI: Cathi Neumann, 30 y.o. male with PMHx significant for T1 DM, presents himself to the ED with cc of ongoing nausea and vomiting since last night (11/17/18). Pt reports associated hematemesis and increased BG in the 200s. Pt notes that his last dose of insulin being this morning. Additionally, pt specifically denies any recent fever, chills, headache, diarrhea, abdominal pain, CP, SOB, lightheadedness, dizziness, numbness, weakness, tingling, BLE swelling, heart palpitations, urinary sxs, changes in BM, changes in PO intake, melena, hematochezia, cough, or congestion. PCP: None    PMHx: Significant for T1 DM, DKA, and sz  PSHx: Significant for left wrist orthopaedic surgery  Social Hx: +tobacco (0.1 ppd), -EtOH, -Illicit Drugs    There are no other complaints, changes or physical findings at this time.      Past History     Past Medical History:  Past Medical History:   Diagnosis Date    Bipolar 1 disorder, depressed ([address]     Bipolar disorder ([address]     Depression     Diabetes ([address]     DKA, type 1 ([address] 1/27/2013    diagnosed age 23    H/O noncompliance with medical treatment, presenting hazards to health     MRSA (methicillin resistant staph aureus) culture positive     MRSA (methicillin resistant Staphylococcus aureus)     Face    Noncompliance with medication regimen     Second hand smoke exposure     Seizure ([address]     Seizures ([address] 2006 or 2007    one episode during [facility]       Past Surgical History:  Past Surgical History:   Procedure Laterality Date    HX HEENT      top left wisdom tooth    HX ORTHOPAEDIC Left     wrist; [facility]       Family History:  Family History   Problem Relation Age of Onset    Diabetes Mother     Diabetes Other         skinny, type 1        Social History:  Social History     Tobacco Use    Smoking status: Current Some Day Smoker     Packs/day: 0.10     Years: 16.00     Pack years: 1.60     Types: Cigarettes    Smokeless tobacco: Never Used   Substance Use Topics    Alcohol use: No    Drug use: No       Allergies:  No Known Allergies      Review of Systems   Review of Systems   Constitutional: Negative for chills and fever. +high BG   HENT: Negative. Negative for congestion, facial swelling, rhinorrhea, sore throat, trouble swallowing and voice change. Eyes: Negative. Respiratory: Negative. Negative for apnea, cough, chest tightness, shortness of breath and wheezing. Cardiovascular: Negative. Negative for chest pain, palpitations and leg swelling. Gastrointestinal: Positive for nausea and vomiting. Negative for abdominal distention, abdominal pain, blood in stool, constipation and diarrhea.        +hematemesis   Endocrine: Negative. Negative for cold intolerance, heat intolerance and polyuria. Genitourinary: Negative. Negative for difficulty urinating, dysuria, flank pain, frequency, hematuria and urgency. Musculoskeletal: Negative. Negative for arthralgias, back pain, myalgias, neck pain and neck stiffness. Skin: Negative. Negative for color change and rash. Neurological: Negative. Negative for dizziness, syncope, facial asymmetry, speech difficulty, weakness, light-headedness, numbness and headaches. Hematological: Negative. Does not bruise/bleed easily. Psychiatric/Behavioral: Negative. Negative for confusion and self-injury. The patient is not nervous/anxious. Physical Exam   Physical Exam   Constitutional: He is oriented to person, place, and time. He is cooperative. He appears toxic. He has a sickly appearance. He appears ill. He appears distressed. HENT:   Head: Normocephalic and atraumatic. Mouth/Throat: Mucous membranes are normal. No posterior oropharyngeal erythema.    Eyes: Conjunctivae and EOM are normal. Pupils are equal, round, and reactive to light. Neck: Normal range of motion. Cardiovascular: Regular rhythm, normal heart sounds and intact distal pulses. Tachycardia present. Exam reveals no gallop and no friction rub. No murmur heard. Pulmonary/Chest: Effort normal and breath sounds normal. No respiratory distress. He has no wheezes. He has no rales. He exhibits no tenderness. Abdominal: Soft. Bowel sounds are normal. He exhibits no distension and no mass. There is no tenderness. There is no rebound and no guarding. Musculoskeletal: Normal range of motion. He exhibits no edema, tenderness or deformity. Neurological: He is alert and oriented to person, place, and time. He displays normal reflexes. No cranial nerve deficit. He exhibits normal muscle tone. Coordination normal.   Skin: Skin is warm. No rash noted. He is diaphoretic. Psychiatric: He has a normal mood and affect. Nursing note and vitals reviewed. Tachy, dry mm  Diagnostic Study Results     Labs -     Recent Results (from the past 12 hour(s))   CBC WITH AUTOMATED DIFF    Collection Time: 11/18/18  8:17 PM   Result Value Ref Range    WBC 12.1 (H) 4.1 - 11.1 K/uL    RBC 4.93 4.10 - 5.70 M/uL    HGB 15.8 12.1 - 17.0 g/dL    HCT 44.6 36.6 - 50.3 %    MCV 90.5 80.0 - 99.0 FL    MCH 32.0 26.0 - 34.0 PG    MCHC 35.4 30.0 - 36.5 g/dL    RDW 12.7 11.5 - 14.5 %    PLATELET 209 769 - 003 K/uL    MPV 10.4 8.9 - 12.9 FL    NRBC 0.0 0  WBC    ABSOLUTE NRBC 0.00 0.00 - 0.01 K/uL    NEUTROPHILS 78 (H) 32 - 75 %    LYMPHOCYTES 15 12 - 49 %    MONOCYTES 6 5 - 13 %    EOSINOPHILS 1 0 - 7 %    BASOPHILS 0 0 - 1 %    IMMATURE GRANULOCYTES 0 0.0 - 0.5 %    ABS. NEUTROPHILS 9.5 (H) 1.8 - 8.0 K/UL    ABS. LYMPHOCYTES 1.9 0.8 - 3.5 K/UL    ABS. MONOCYTES 0.7 0.0 - 1.0 K/UL    ABS. EOSINOPHILS 0.1 0.0 - 0.4 K/UL    ABS. BASOPHILS 0.0 0.0 - 0.1 K/UL    ABS. IMM.  GRANS. 0.0 0.00 - 0.04 K/UL    DF AUTOMATED     METABOLIC PANEL, COMPREHENSIVE    Collection Time: 11/18/18  8:17 PM   Result Value Ref Range    Sodium 139 136 - 145 mmol/L    Potassium 4.3 3.5 - 5.1 mmol/L    Chloride 104 97 - 108 mmol/L    CO2 13 (LL) 21 - 32 mmol/L    Anion gap 22 (H) 5 - 15 mmol/L    Glucose 298 (H) 65 - 100 mg/dL    BUN 19 6 - 20 MG/DL    Creatinine 1.17 0.70 - 1.30 MG/DL    BUN/Creatinine ratio 16 12 - 20      GFR est AA >60 >60 ml/min/1.73m2    GFR est non-AA >60 >60 ml/min/1.73m2    Calcium 9.0 8.5 - 10.1 MG/DL    Bilirubin, total 0.5 0.2 - 1.0 MG/DL    ALT (SGPT) 46 12 - 78 U/L    AST (SGOT) 22 15 - 37 U/L    Alk. phosphatase 155 (H) 45 - 117 U/L    Protein, total 7.7 6.4 - 8.2 g/dL    Albumin 3.9 3.5 - 5.0 g/dL    Globulin 3.8 2.0 - 4.0 g/dL    A-G Ratio 1.0 (L) 1.1 - 2.2     VENOUS BLOOD GAS    Collection Time: 11/18/18  8:34 PM   Result Value Ref Range    VENOUS PH 7.15 (LL) 7.32 - 7.42      VENOUS PCO2 36 (L) 41 - 51 mmHg    VENOUS PO2 37 25 - 40 mmHg    VENOUS O2 SATURATION 56 (L) 65 - 88 %    VENOUS BICARBONATE 12 (L) 23 - 28 mmol/L    VENOUS BASE DEFICIT 15.6 mmol/L    O2 METHOD ROOM AIR      SPONTANEOUS RATE 26.0      Sample source VENOUS      SITE OTHER      Critical value read back Erica Anders MD    LACTIC ACID    Collection Time: 11/18/18  8:34 PM   Result Value Ref Range    Lactic acid 1.1 0.4 - 2.0 MMOL/L   EKG, 12 LEAD, INITIAL    Collection Time: 11/18/18  8:39 PM   Result Value Ref Range    Ventricular Rate 110 BPM    Atrial Rate 110 BPM    P-R Interval 146 ms    QRS Duration 94 ms    Q-T Interval 348 ms    QTC Calculation (Bezet) 470 ms    Calculated P Axis 79 degrees    Calculated R Axis 104 degrees    Calculated T Axis 71 degrees    Diagnosis       Sinus tachycardia  Rightward axis  When compared with ECG of 02-SEP-2018 13:29,  QRS duration has decreased  T wave amplitude has decreased in Anterolateral leads       Medical Decision Making   I am the first provider for this patient.     I reviewed the vital signs, available nursing notes, past medical history, past surgical history, family history and social history. Vital Signs-Reviewed the patient's vital signs. Patient Vitals for the past 12 hrs:   Temp Pulse Resp BP SpO2   11/18/18 2009 98.3 °F (36.8 °C) (!) 103 18 137/66 98 %     Pulse Oximetry Analysis - 98% on 0L    Cardiac Monitor:   Rate: 103 bpm  Rhythm: Sinus Tachycardia      ED EKG interpretation: 20:39  Rhythm: sinus tachycardia; and regular . Rate (approx.): 110; Axis: rightward axis; P wave: normal; QRS interval: normal ; ST/T wave: normal; Other findings: abnormal ekg. This EKG was interpreted by Sumit Chong M.D. Records Reviewed: Nursing Notes, Old Medical Records, Previous electrocardiograms, Previous Radiology Studies and Previous Laboratory Studies    Provider Notes (Medical Decision Making):   Patient presents with acute nausea and vomiting. Pt is afebrile with stable vitals; exam is non peritoneal. Differential includes gastritis/GERD, electrolyte disturbance, FELECIA, pancreatitis cholelithiasis, cholecystitis, hepatitis, renal pathology, ACS, gastroenteritis, infection such as UTI/PNA. Will obtain  labs and possibly imaging and EKG PRN. Will treat symptomatically and reassess. ED Course:   Initial assessment performed. The patients presenting problems have been discussed, and they are in agreement with the care plan formulated and outlined with them. I have encouraged them to ask questions as they arise throughout their visit.     Medications Administered during ED Management:  Medications   insulin regular (NOVOLIN R, HUMULIN R) 100 Units in 0.9% sodium chloride 100 mL infusion (0 Units/hr IntraVENous Held 11/19/18 1428)   insulin lispro (HUMALOG) injection (0 Units SubCUTAneous Held 11/19/18 1130)   glucose chewable tablet 16 g (not administered)   dextrose (D50W) injection syrg 12.5-25 g (not administered)   glucagon (GLUCAGEN) injection 1 mg (not administered)   dextrose 5% and 0.9% NaCl infusion (150 mL/hr IntraVENous New Bag 18 1351)   gabapentin (NEURONTIN) capsule 200 mg (200 mg Oral Refused 18 0000)   pantoprazole (PROTONIX) 40 mg in sodium chloride 0.9% 10 mL injection (40 mg IntraVENous Given 18 1057)   sodium chloride (NS) flush 5-10 mL (10 mL IntraVENous Given 18 1351)   sodium chloride (NS) flush 5-10 mL (not administered)   acetaminophen (TYLENOL) tablet 650 mg (not administered)   ondansetron (ZOFRAN) injection 4 mg (4 mg IntraVENous Given 18 105)   ondansetron (ZOFRAN) injection 4 mg (4 mg IntraVENous Given 18)   sodium chloride 0.9 % bolus infusion 1,000 mL (0 mL IntraVENous IV Completed 18)   sodium bicarbonate 8.4 % (1 mEq/mL) injection 50 mEq (50 mEq IntraVENous Given 18)   pantoprazole (PROTONIX) 40 mg in sodium chloride 0.9% 10 mL injection (40 mg IntraVENous Given 18)   ondansetron (ZOFRAN) injection 4 mg (4 mg IntraVENous Given 18)     I reviewed our electronic medical record system for any past medical records that were available that may contribute to the patients current condition, the nursing notes and vital signs from today's visit. Fransico Dao MD    TOBACCO COUNSELIN:36 PM  Upon evaluation, pt expressed that they are a current tobacco user. For approximately 10 minutes, pt has been counseled on the dangers of smoking and was encouraged to quit as soon as possible in order to decrease further risks to their health. Pt has conveyed their understanding of the risks involved should they continue to use tobacco products. PROGRESS NOTE:   8:43 PM  Per respiratory,  ABG reviewed; pt's pH is 7.15 and bicarb of 12. Will start bicarb drip. 9:20PM  Labs demonstrated significant anion gap, meets criteria for type 1 DKA, will start insulin drip.    9:52 PM  Started on bicarb drip and insulin drip. Pt's mentation improving.     Consult Note:  10:13 PM  Tonja Cullen MD spoke with Dr. Andreia Baer,  Specialty: Hospitalist  Discussed pt's hx, disposition, and available diagnostic and imaging results. Reviewed care plans. Consultant agrees with plans as outlined. Will admit pt. CRITICAL CARE NOTE :    11:00PM    IMPENDING DETERIORATION -CNS, Metabolic and Renal  ASSOCIATED RISK FACTORS - Hypotension, Shock, Hypoxia, Dysrhythmia, Vascular Compromise and CNS Decompensation  MANAGEMENT- Bedside Assessment and Supervision of Care  INTERPRETATION -  Xrays, Blood Gases, ECG, Blood Pressure and Cardiac Output Measures   INTERVENTIONS - hemodynamic mngmt, Metobolic interventions and management of DKA requiring insulin drip, management of acute metabolic acidosis requiring bicarbonate therapy  CASE REVIEW - Hospitalist, Nursing and Family  TREATMENT RESPONSE -Improved  PERFORMED BY - Self    NOTES   :    I have spent 70 minutes of critical care time involved in lab review, consultations with specialist, family decision- making, bedside attention and documentation. During this entire length of time I was immediately available to the patient . Critical Care: The reason for providing this level of medical care for this critically ill patient was due to a critical illness that impaired one or more vital organ systems, such that there was a high probability of imminent or life threatening deterioration in the patient's condition. This care involved high complexity decision making to assess, manipulate, and support vital system functions, to treat this degree of vital organ system failure, and to prevent further life threatening deterioration of the patients condition. Scott Riley MD        Admit Note:  10:14 PM  Pt is being admitted by Dr. Tin Bolanos. The results of their tests and reason(s) for their admission have been discussed with pt and/or available family. They convey agreement and understanding for the need to be admitted and for admission diagnosis. PLAN:  1.  Will admit pt to Hospitalist    Diagnosis Clinical Impression:   1. Diabetic ketoacidosis without coma associated with type 1 diabetes mellitus ([facility] [address]    2. Metabolic acidosis    3. Acute upper GI bleed    4. Acute vomiting    5. SIRS (systemic inflammatory response syndrome) (HCC)        Attestations  This note is prepared by [employer], acting as Scribe for Donnell Rinne, MD Donnell Rinne, MD : The scribe's documentation has been prepared under my direction and personally reviewed by me in its entirety. I confirm that the note above accurately reflects all work, treatment, procedures, and medical decision making performed by me.    :  This note will not be viewable in [address] [place]

## 2018-11-19 NOTE — PROGRESS NOTES
PT note:     Orders received and acknowledged. Chart reviewed and cleared by nursing. Spoke with patient who reports he is up ad leeanne, at his baseline, and with no PT needs at this time. Will sign off.      Thompson Carroll, PT, DPT

## 2018-11-19 NOTE — PROGRESS NOTES
Hospitalist Progress Note    NAME: Hao Jose   :  1980   MRN:  [id number]       Assessment / Plan:    DKA, type 1  Admit to PCU  Given 1L fluid bolus in ED, will give another liter and start IVF at 150ml/hr  Insulin drip  Add K once K < 3.5   Hold off on Bicarb ordered by ED consider acidosis related to DKA  NPO with ice chip and meds only for now  , will add D5 to the fluid as suspect blood sugar will drop some with fluid boluses and need IV insulin for DKA  Q4H BMP and Mg  -n improved no more v, AG resolved, plans for EGD in am  -will transition drip to sq, give lantus 35 now and start ssi, add mealtime humalog depending on repeat BG at dinner time  -check bmp at 6pm  -DM consulted  -case management consult for pcp  -a1c 13.7     Upper GI bleed  Hx of esophagitis  Hb stable, could be due to dehydration with DKA  Suspect Lillie Siddhartha Tear considering vomiting started first and then vomiting blood  NPO as above, adv to clears now, npo after MN  IV PPIs  GI appreciate, plans for EGD in am     SIRs   Due to DKA  Check UA  Afebrile     Nausea, Vomiting  Due to DKA induced gastroparesis  If un resolves as DKA resolve then may need further workup  GI consult as above   PRN Zofran, IVF, NPO as above     Tobacco abuse  Decline nicotine patch     Neuropathy   Continue neurontin     Code Status: Full  Surrogate Decision Maker: Mother     DVT Prophylaxis: SCDs     Baseline: functional    18.5 - 24.9 Normal weight / Body mass index is 20.97 kg/m². Recommended Disposition: Home w/Family needs pcp     Subjective:     Chief Complaint / Reason for Physician Visit  N/v/hematemesis    Patient had some nausea little bit of vomiting this morning but no further blood. He says he was doing fine and his sugars have been well controlled until yesterday when he started vomiting he vomited blood once but that has not occurred. He denies any shortness of breath or chest pain.   No abdominal pain his says his nausea is much improved. He remains n.p.o. for GI evaluation. He has been on insulin drip and his sugars are much improved and his anion gap has resolved. Patient was evaluated at 12:45 PM      Discussed with RN events overnight. Review of Systems:  Symptom Y/N Comments  Symptom Y/N Comments   Fever/Chills    Chest Pain n    Poor Appetite    Edema     Cough n   Abdominal Pain n    Sputum n   Joint Pain     SOB/JOHNSTON n   Pruritis/Rash     Nausea/vomit y better  Tolerating PT/OT     Diarrhea    Tolerating Diet     Constipation    Other       Could NOT obtain due to:      Objective:     VITALS:   Last 24hrs VS reviewed since prior progress note. Most recent are:  Patient Vitals for the past 24 hrs:   Temp Pulse Resp BP SpO2   11/19/18 1457 98.1 °F (36.7 °C) 96 16 143/83 99 %   11/19/18 1105 98.3 °F (36.8 °C) 91 16 132/74 99 %   11/19/18 0748 98.1 °F (36.7 °C) 100 16 134/81 99 %   11/19/18 0319 98.1 °F (36.7 °C) 97 16 124/68 98 %   11/18/18 2346 98 °F (36.7 °C) 100 18 133/71 99 %   11/18/18 2338 98.2 °F (36.8 °C) (!) 101 18 131/68 100 %   11/18/18 2009 98.3 °F (36.8 °C) (!) 103 18 137/66 98 %       Intake/Output Summary (Last 24 hours) at 11/19/2018 1625  Last data filed at 11/19/2018 1311  Gross per 24 hour   Intake 440 ml   Output 1200 ml   Net -760 ml        PHYSICAL EXAM:  Patient is awake and alert nontoxic-appearing on room air oriented x3 no distress. Conjunctiva pink mucous membranes slightly tacky cardiovascular regular rate no murmurs rubs or gallops. Lungs are clear no wheezes rhonchi or crackles. Abdomen bowel sounds present soft nontender.   Extremities no clubbing cyanosis or edema    Reviewed most current lab test results and cultures  YES  Reviewed most current radiology test results   YES  Review and summation of old records today    NO  Reviewed patient's current orders and MAR    YES  PMH/SH reviewed - no change compared to H&P  ________________________________________________________________________  Care Plan discussed with:    Comments   Patient y    Family      RN y    Care Manager     Consultant                        Multidiciplinary team rounds were held today with , nursing, pharmacist and clinical coordinator. Patient's plan of care was discussed; medications were reviewed and discharge planning was addressed. ________________________________________________________________________  Total NON critical care TIME:     Minutes    Total CRITICAL CARE TIME Spent:   Minutes non procedure based      Comments   >50% of visit spent in counseling and coordination of care     ________________________________________________________________________  Cheri Morgan MD     Procedures: see electronic medical records for all procedures/Xrays and details which were not copied into this note but were reviewed prior to creation of Plan. LABS:  I reviewed today's most current labs and imaging studies.   Pertinent labs include:  Recent Labs     11/19/18  0325 11/18/18 2017   WBC 10.9 12.1*   HGB 13.6 15.8   HCT 38.7 44.6    340     Recent Labs     11/19/18  0743 11/19/18  0325 11/18/18  2316 11/18/18 2017   * 144 143 139   K 3.5 3.8 4.4 4.3   * 114* 110* 104   CO2 23 18* 14* 13*   * 209* 235* 298*   BUN 16 16 17 19   CREA 0.95 1.02 1.02 1.17   CA 8.2* 8.6 8.4* 9.0   MG 2.1  --  2.1  --    PHOS  --   --  3.2  --    ALB  --  3.3*  --  3.9   TBILI  --  0.4  --  0.5   SGOT  --  15  --  22   ALT  --  36  --  46       Signed: Cheri Morgan MD

## 2018-11-19 NOTE — H&P
Hospitalist Admission Note    NAME: Aleksandra Collins   :  1980   MRN:  [id number]     Date/Time:  2018 10:28 PM    Patient PCP: None  ______________________________________________________________________  Given the patient's current clinical presentation, I have a high level of concern for decompensation if discharged from the emergency department. Complex decision making was performed, which includes reviewing the patient's available past medical records, laboratory results, and x-ray films. My assessment of this patient's clinical condition and my plan of care is as follows. Assessment / Plan:  DKA, type 1  Admit to PCU  Given 1L fluid bolus in ED, will give another liter and start IVF at 150ml/hr  Insulin drip  Add K once K < 3.5   Hold off on Bicarb ordered by ED consider acidosis related to DKA  NPO with ice chip and meds only for now  , will add D5 to the fluid as suspect blood sugar will drop some with fluid boluses and need IV insulin for DKA  Q4H BMP and Mg    Upper GI bleed  Hb stable, could be due to dehydration with DKA  Suspect Lillie Siddhartha Tear considering vomiting started first and then vomiting blood  NPO as above  IV PPIs  GI consult    SIRs   Due to DKA  Check UA  Afebrile    Nausea, Vomiting  Due to DKA induced gastroparesis  If un resolves as DKA resolve then may need further workup  GI consult as above   PRN Zofran, IVF, NPO as above    Tobacco abuse  Decline nicotine patch    Neuropathy   Continue neurontin    Code Status: Full  Surrogate Decision Maker: Mother    DVT Prophylaxis: SCDs    Baseline: functional      Subjective:   CHIEF COMPLAINT: *nausea, vomiting    HISTORY OF PRESENT ILLNESS:     Charles Johnson is a 30 y.o.  male who presents with nausea and vomiting. As per patient, he started to have nausea, vomiting last night and today he noted blood in vomiting.  Pt denies any fever, chills, dark stools, cough, shortness of breath, problems urination, chest pain, abdominal pain, diarrhea. In ED pt noted to have metabolic acidosis and elevated anion gap and . We were asked to admit for work up and evaluation of the above problems. Past Medical History:   Diagnosis Date    [facility]     Bipolar disorder [facility]     Depression     Diabetes ([facility]     DKA, type 1 ([facility] 1/27/2013    diagnosed age 23    H/O noncompliance with medical treatment, presenting hazards to health     MRSA (methicillin resistant staph aureus) culture positive     MRSA (methicillin resistant Staphylococcus aureus)     Face    Noncompliance with medication regimen     Second hand smoke exposure     Seizure ([facility]     Seizures ([facility] 2006 or 2007    one episode during assisted        Past Surgical History:   Procedure Laterality Date    HX HEENT      top left wisdom tooth    HX ORTHOPAEDIC Left     wrist; [facility]       Social History     Tobacco Use    Smoking status: Current Some Day Smoker     Packs/day: 0.10     Years: 16.00     Pack years: 1.60     Types: Cigarettes    Smokeless tobacco: Never Used   Substance Use Topics    Alcohol use: No        Family History   Problem Relation Age of Onset    Diabetes Mother     Diabetes Other         neice, type 1      No Known Allergies     Prior to Admission medications    Medication Sig Start Date End Date Taking? Authorizing Provider   insulin glargine (LANTUS) 100 unit/mL injection 50 units daily 9/10/18   Cheo Murry MD   insulin regular (NOVOLIN R, HUMULIN R) 100 unit/mL injection 8 units 3 times a day before meals. 9/10/18   Cheo Murry MD   gabapentin (NEURONTIN) 100 mg capsule Take 2 Caps by mouth nightly. 9/5/18   Tari Vogel MD   pantoprazole (PROTONIX) 40 mg tablet Take 1 Tab by mouth Daily (before breakfast).  9/6/18   Betzaida Sosa MD   OTHER Glucometer from [address]      These are over the counter and very affordable 9/5/18   Betzaida Sosa MD   acetaminophen (TYLENOL EXTRA STRENGTH) 500 mg tablet Take 1,000 mg by mouth two (2) times daily as needed ('Pain/Headache'). Provider, Historical   insulin regular (NOVOLIN R) 100 unit/mL injection 2-10 Units by SubCUTAneous route See Admin Instructions. Take 3-4 times daily per sliding scale; Patient is not sure of exact scale.'    Provider, Historical   insulin glargine (LANTUS) 100 unit/mL injection 46 Units by SubCUTAneous route daily. Provider, Historical       REVIEW OF SYSTEMS:     I am not able to complete the review of systems because:    The patient is intubated and sedated    The patient has altered mental status due to his acute medical problems    The patient has baseline aphasia from prior stroke(s)    The patient has baseline dementia and is not reliable historian    The patient is in acute medical distress and unable to provide information           Total of 12 systems reviewed as follows:       POSITIVE= underlined text  Negative = text not underlined  General:  fever, chills, sweats, generalized weakness, weight loss/gain,      loss of appetite   Eyes:    blurred vision, eye pain, loss of vision, double vision  ENT:    rhinorrhea, pharyngitis   Respiratory:   cough, sputum production, SOB, [place], wheezing, pleuritic pain   Cardiology:   chest pain, palpitations, orthopnea, PND, edema, syncope   Gastrointestinal:  abdominal pain , N/V, diarrhea, dysphagia, constipation, bleeding   Genitourinary:  frequency, urgency, dysuria, hematuria, incontinence   Muskuloskeletal :  arthralgia, myalgia, back pain  Hematology:  easy bruising, nose or gum bleeding, lymphadenopathy   Dermatological: rash, ulceration, pruritis, color change / jaundice  Endocrine:   hot flashes or polydipsia   Neurological:  headache, dizziness, confusion, focal weakness, paresthesia,     Speech difficulties, memory loss, gait difficulty  Psychological: Feelings of anxiety, depression, agitation    Objective:   VITALS: Visit Vitals  /66   Pulse (!) 103   Temp 98.3 °F (36.8 °C)   Resp 18   Ht 5' 9\" (1.753 m)   Wt 64.4 kg (142 lb)   SpO2 98%   BMI 20.97 kg/m²       PHYSICAL EXAM:    General:    Alert, cooperative, no distress, appears stated age. HEENT: Atraumatic, anicteric sclerae, pink conjunctivae     No oral ulcers, mucosa dry, throat clear, dentition fair  Neck:  Supple, symmetrical,  thyroid: non tender  Lungs:   Clear to auscultation bilaterally. No Wheezing or Rhonchi. No rales. Chest wall:  No tenderness  No Accessory muscle use. Heart:   Regular  rhythm,  No  murmur   No edema  Abdomen:   Soft, non-tender. Not distended. Bowel sounds normal  Extremities: No cyanosis. No clubbing,      Skin turgor normal, Capillary refill normal, Radial dial pulse 2+  Skin:     Not pale. Not Jaundiced  No rashes   Psych:  Good insight. Not depressed. Not anxious or agitated. Neurologic: EOMs intact. No facial asymmetry. No aphasia or slurred speech. Symmetrical strength, Sensation grossly intact.  Alert and oriented X 4.     _______________________________________________________________________  Care Plan discussed with:    Comments   Patient y    Family      RN y    Care Manager                    Consultant:  sherry ED physician   _______________________________________________________________________  Expected  Disposition:   Home with Family y   HH/PT/OT/RN    SNF/LTC    CATHIE    ________________________________________________________________________  TOTAL TIME: 54 Minutes    Critical Care Provided  54   Minutes non procedure based      Comments    y Reviewed previous records   >50% of visit spent in counseling and coordination of care y Discussion with patient and family and questions answered       ________________________________________________________________________  Signed: Shelly Brooks MD    Procedures: see electronic medical records for all procedures/Xrays and details which were not copied into this note but were reviewed prior to creation of Plan. LAB DATA REVIEWED:    Recent Results (from the past 24 hour(s))   CBC WITH AUTOMATED DIFF    Collection Time: 11/18/18  8:17 PM   Result Value Ref Range    WBC 12.1 (H) 4.1 - 11.1 K/uL    RBC 4.93 4.10 - 5.70 M/uL    HGB 15.8 12.1 - 17.0 g/dL    HCT 44.6 36.6 - 50.3 %    MCV 90.5 80.0 - 99.0 FL    MCH 32.0 26.0 - 34.0 PG    MCHC 35.4 30.0 - 36.5 g/dL    RDW 12.7 11.5 - 14.5 %    PLATELET 483 819 - 601 K/uL    MPV 10.4 8.9 - 12.9 FL    NRBC 0.0 0  WBC    ABSOLUTE NRBC 0.00 0.00 - 0.01 K/uL    NEUTROPHILS 78 (H) 32 - 75 %    LYMPHOCYTES 15 12 - 49 %    MONOCYTES 6 5 - 13 %    EOSINOPHILS 1 0 - 7 %    BASOPHILS 0 0 - 1 %    IMMATURE GRANULOCYTES 0 0.0 - 0.5 %    ABS. NEUTROPHILS 9.5 (H) 1.8 - 8.0 K/UL    ABS. LYMPHOCYTES 1.9 0.8 - 3.5 K/UL    ABS. MONOCYTES 0.7 0.0 - 1.0 K/UL    ABS. EOSINOPHILS 0.1 0.0 - 0.4 K/UL    ABS. BASOPHILS 0.0 0.0 - 0.1 K/UL    ABS. IMM. GRANS. 0.0 0.00 - 0.04 K/UL    DF AUTOMATED     METABOLIC PANEL, COMPREHENSIVE    Collection Time: 11/18/18  8:17 PM   Result Value Ref Range    Sodium 139 136 - 145 mmol/L    Potassium 4.3 3.5 - 5.1 mmol/L    Chloride 104 97 - 108 mmol/L    CO2 13 (LL) 21 - 32 mmol/L    Anion gap 22 (H) 5 - 15 mmol/L    Glucose 298 (H) 65 - 100 mg/dL    BUN 19 6 - 20 MG/DL    Creatinine 1.17 0.70 - 1.30 MG/DL    BUN/Creatinine ratio 16 12 - 20      GFR est AA >60 >60 ml/min/1.73m2    GFR est non-AA >60 >60 ml/min/1.73m2    Calcium 9.0 8.5 - 10.1 MG/DL    Bilirubin, total 0.5 0.2 - 1.0 MG/DL    ALT (SGPT) 46 12 - 78 U/L    AST (SGOT) 22 15 - 37 U/L    Alk.  phosphatase 155 (H) 45 - 117 U/L    Protein, total 7.7 6.4 - 8.2 g/dL    Albumin 3.9 3.5 - 5.0 g/dL    Globulin 3.8 2.0 - 4.0 g/dL    A-G Ratio 1.0 (L) 1.1 - 2.2     VENOUS BLOOD GAS    Collection Time: 11/18/18  8:34 PM   Result Value Ref Range    VENOUS PH 7.15 (LL) 7.32 - 7.42      VENOUS PCO2 36 (L) 41 - 51 mmHg    VENOUS PO2 37 25 - 40 mmHg    VENOUS O2 SATURATION 56 (L) 65 - 88 %    VENOUS BICARBONATE 12 (L) 23 - 28 mmol/L    VENOUS BASE DEFICIT 15.6 mmol/L    O2 METHOD ROOM AIR      SPONTANEOUS RATE 26.0      Sample source VENOUS      SITE OTHER      Critical value read back Prashanth Meneses MD    LACTIC ACID    Collection Time: 11/18/18  8:34 PM   Result Value Ref Range    Lactic acid 1.1 0.4 - 2.0 MMOL/L   EKG, 12 LEAD, INITIAL    Collection Time: 11/18/18  8:39 PM   Result Value Ref Range    Ventricular Rate 110 BPM    Atrial Rate 110 BPM    P-R Interval 146 ms    QRS Duration 94 ms    Q-T Interval 348 ms    QTC Calculation (Bezet) 470 ms    Calculated P Axis 79 degrees    Calculated R Axis 104 degrees    Calculated T Axis 71 degrees    Diagnosis       Sinus tachycardia  Rightward axis  When compared with ECG of 02-SEP-2018 13:29,  QRS duration has decreased  T wave amplitude has decreased in Anterolateral leads

## 2018-11-19 NOTE — DIABETES MGMT
DTC Consult Note    Recommendations/ Comments:    Consult received for assessment of home management. Pt admitted yesterday with DKA and currently on insulin gtt. Recommend continuing insulin gtt until anion gap less than 12 x2 consecutive blood draws then resume home regimen. Insulin gtt should be continued for 2hrs after administration of lantus dose. Discontinue D5 IVF when insulin gtt discontinued. Met with patient. States that he had a few beers (2-3) yesterday and started throwing up. He denies skipping his insulin and reports that he never misses his insulin. Discussed the importance of not missing insulin when sick. Discussed staying hydrated when drinking, and to balance alcohol intake with CHO foods. SO present during visit. States patient does not drink water, and patient reports that he does not check his sugars when he is drinking. Encouraged patient to also check his sugars with alcohol intake. Patient known to DTC as patient has been seen numerous times- last seen 9/4/18 by DTC. Patient declines further education at this time. Current hospital DM medication: insulin gtt    Consult received for:  [x]             Assessment of home management     Chart reviewed and initial evaluation complete on Milagro Benites. Patient is a 30 y.o. male with Type 1 diabetes on intensive insulin regimen at home. Assessed and instructed patient on the following:   ·  illness management, referred to Diabetes Educator and alcohol         Provided patient with the following: patient declines materials at this time- states he has a copy of the survival skills book at home.         A1c:   Lab Results   Component Value Date/Time    Hemoglobin A1c 13.7 (H) 11/18/2018 08:17 PM       Recent Glucose Results:   Lab Results   Component Value Date/Time     (H) 11/19/2018 07:43 AM     (H) 11/19/2018 03:25 AM     (H) 11/18/2018 11:16 PM    GLUCPOC 130 (H) 11/19/2018 10:55 AM GLUCPOC 141 (H) 11/19/2018 09:51 AM    GLUCPOC 121 (H) 11/19/2018 08:46 AM        Lab Results   Component Value Date/Time    Creatinine 0.95 11/19/2018 07:43 AM     Estimated Creatinine Clearance: 98.9 mL/min (based on SCr of 0.95 mg/dL). Active Orders   Diet    DIET NPO With Ice Chips        PO intake:   Patient Vitals for the past 72 hrs:   % Diet Eaten   11/19/18 1105 0 %       Will continue to follow as needed. Thank you.   Minh Campbell, [address], [address]  Office:  [phone]

## 2018-11-19 NOTE — PROGRESS NOTES
Reason for Admission:  DKA type 1                    RRAT Score:  11                   Plan for utilizing home health: There is no plan to utilize [facility] at this time. Likelihood of Readmission: Low                          Transition of Care Plan:  CM met with pt and pt milka to discuss d/c planning. Pt resides with his mother, milka, and their baby in a trailer (no stairs to entry). Pt milka (Melly Vini [phone]) will transport pt home upon d/c. Prior to hospitalization pt able to complete ADLs and drive. Pt does not believe that he will have any difficulty with ADLs when discharged. Pt seen by PT and OT who do not have any recommendations for d/c. Pt states that he does have insurance through his job, but he is unsure of who the provider is. CM asked that pt have a relative check his insurance cards to get the information. Pt states that previously he would share medications with his mom, because he could not afford them. Pt had not attempted to get his medications using his new insurance coverage. CM confirmed that pt does not have a PCP. CM provided pt with a list of [facility] PCPs. CM will continue to follow pt for d/c planning needs. Care Management Interventions  Mode of Transport at Discharge:  Other (see comment)(Pt milka will transport home)  Transition of Care Consult (CM Consult): Discharge Planning  Discharge Durable Medical Equipment: No  Physical Therapy Consult: Yes  Occupational Therapy Consult: Yes  Current Support Network: Family Lives Nearby, Lives with Spouse, Own Home  Confirm Follow Up Transport: Family(Pt milka will transport )  Plan discussed with Pt/Family/Caregiver: Yes  Discharge Location  Discharge Placement: Home    Akash Arreola, Care Manager  [phone]

## 2018-11-19 NOTE — PROGRESS NOTES
OT referral received, chart reviewed, and patient screened for OT needs. Spoke with patient who reports being at his independent baseline for ADLs and functional mobility. No OT needs indicated at this time. Will complete order.

## 2018-11-19 NOTE — PROGRESS NOTES
TRANSFER - IN REPORT:    Verbal report received from ASAD Serrano(name) on [address]  being received from ED(unit) for routine progression of care      Report consisted of patients Situation, Background, Assessment and   Recommendations(SBAR). Information from the following report(s) SBAR, Kardex, ED Summary and MAR was reviewed with the receiving nurse. Opportunity for questions and clarification was provided. Assessment completed upon patients arrival to unit and care assumed.        Primary Nurse Karan Reddy and Carmela Wilkinson RN performed a dual skin assessment on this patient Impairment noted- see wound doc flow sheet  Corey score is 23

## 2018-11-19 NOTE — PROGRESS NOTES
Bedside shift change report given to Anabel Mccormick RN & Jeovanny Kelly RN (oncoming nurse) by Gregory Mccall RN (offgoing nurse). Report included the following information SBAR, Kardex, ED Summary, Procedure Summary, Intake/Output, MAR, Recent Results, Med Rec Status and Cardiac Rhythm A Flutter.

## 2018-11-19 NOTE — PROGRESS NOTES
Bedside shift change report given to Arianne Henson, RN & [facility], RN (oncoming nurse) by Roselia Ernst, RN (offgoing nurse). Report included the following information SBAR, Kardex, ED Summary, Procedure Summary, Intake/Output, MAR, Recent Results, Med Rec Status and Cardiac Rhythm NSR.     0908- Dr Macario Aparicio notified drip is on hold and anion gap is closed post labs. Since pt is still nauseated and vomiting Dr wants to keep pt on insulin drip. Awaiting GI consult. [id number] [facility] DM consult per Dr Macario Aparicio orders. 1649- Lantus given per [facility] will DC insulin drip in 2 hours if levels allow. 18- Dr Macario Aparicio informed of pts bilat leg pain. Informed to go ahead and give neurontin and so if it helps. 1730- Consent for EGD signed and placed on the chart. 5491- Dr Macario Aparicio ordered prn pain medication for bilat leg pain. 1845- Insulin drip DC'd and NS started (see MAR)    1900-  Bedside shift change report given to [facility] JEF, ASAD (oncoming nurse) by Sage Mcdaniels RN (offgoing nurse). Report included the following information SBAR, Kardex, ED Summary, Procedure Summary, Intake/Output, MAR, Recent Results, Med Rec Status and Cardiac Rhythm NSR.

## 2018-11-19 NOTE — PROGRESS NOTES
Patient visited by a Spiritual Care Partner Volunteer in [facility] Unit on 11/19/2018. Documented by Rev. Rudine Cockayne, [address] [place] [address] Road paging service: [phone])

## 2018-11-19 NOTE — CONSULTS
[address]     James Hull  MR#: [id number]  : 1980  ACCOUNT #: [de-identified]   DATE OF SERVICE: 2018    CONSULTING PHYSICIAN:  Dr. Girma Mckeon:  Hematemesis. HISTORY OF PRESENT ILLNESS:  The patient is a 63-year-old white male with a previous history of uncontrolled diabetes with current bouts of DKA, noncompliance who came to the emergency department yesterday due to refractory nausea, vomiting and some hematemesis that started on Saturday night. He claims he went out with a friend and had 3 beers and shortly thereafter felt sick to his stomach and started vomiting and initially it was yellow and bile in appearance, but then he saw real dark and black vomitus thereafter. He came to the ER last night. His blood glucose was 298 with an anion gap of 22 and bicarbonate of 13. He was placed on IV fluids and insulin drip. His hemoglobin A1c was 13.7. Hemoglobin was 15.8. He was not on a proton pump inhibitor prior to admission. He was supposed to be taking Protonix 40 mg daily. He has a past medical history for esophagitis and erosive gastritis. Last upper endoscopy was 2015 and of LA grade C erosive esophagitis and 2 erosions in the stomach. Prior to that, he had an EGD 2013 showing this grade B esophagitis and gastritis. He is not on any NSAIDs. He denies any illicit drug use. His urinary drug screen was negative. PAST MEDICAL HISTORY:  Significant for type 1 diabetes, hypertension, neuropathy, tobacco abuse, esophagitis, bipolar disorder. ALLERGIES:  None. SOCIAL HISTORY:  Smokes half pack a day. Alcohol:  Occasional use. FAMILY HISTORY:  Diabetes in his mother. MEDICATIONS:  Prior to admission include, Lantus insulin 50 units daily, gabapentin 100 mg capsules 2 at night, ? Protonix 40 mg a day claims he is not taking. REVIEW OF SYSTEMS:  A 10-point review of system.   Pertinent positives and negatives as follows:  GENERAL:  No fevers, chills, night sweats. HEENT:  No difficulty swallowing. NEUROLOGIC:  No headaches or dizziness. CARDIOVASCULAR:  No chest pain or palpitations. PULMONARY:  No wheeze, no cough. MUSCULOSKELETAL:  No joint complaints. GENITOURINARY:  No dysuria, no hematuria. PSYCHIATRIC:  No depression or anxiety. PHYSICAL EXAMINATION:  GENERAL:  Thin, well-appearing white male lying in bed. He is in no acute distress. SKIN:  Shows multiple tattoos. HEENT:  Pupils equal, round, reactive to light. Sclerae are anicteric. Oropharynx has moist mucous membranes, no thrush. No oropharyngeal lesions. Supple. He has poor dentition. NECK:  Supple without lymphadenopathy, thyromegaly or JVD. CARDIOVASCULAR:  Regular rate and rhythm. No murmurs. LUNGS:  Clear to auscultation bilaterally. ABDOMEN:  Soft, nontender to palpation, nondistended, normoactive bowel sounds. No hepatosplenomegaly, no rebound or guarding. NEUROLOGIC:  Awake, alert and oriented x3. LABORATORY DATA:  Show WBC 10.9, hemoglobin 13.6, hematocrit 38.7, platelets 836. Sodium 146, potassium 3.5, chloride 114, bicarbonate 23, BUN 16, creatinine 0.95, glucose 140. ASSESSMENT:  1.  Diabetic ketoacidosis. 2.  Refractory nausea, vomiting with hematemesis. 3.  History of erosive esophagitis and gastritis. 4.  Medication noncompliance. PLAN:  The patient's current episode started with drinking alcohol and vomiting, which led to dehydration and DKA. He has had some coffee-ground emesis, but his hemoglobin is normal.  He has had a longstanding history of LA grade B and C esophagitis as well as erosive gastritis. Last endoscopy over 3 years ago. I think during this hospitalization, he should have a repeat upper endoscopy to assess for any ulceration of his esophagus or stomach and to risk stratify him for recurrent GI bleeding. Place him on IV PPI b.i.d.     DIET:  As tolerated, n.p.o. after midnight for possible EGD tomorrow. Further recommendation depending on results of EGD.       Arron Neely MD MM / MN  D: 11/19/2018 13:49     T: 11/19/2018 14:54  JOB #: [id number]

## 2018-11-20 ENCOUNTER — ANESTHESIA EVENT (OUTPATIENT)
Dept: ENDOSCOPY | Age: 38
DRG: 637 | End: 2018-11-20
Payer: SELF-PAY

## 2018-11-20 ENCOUNTER — ANESTHESIA (OUTPATIENT)
Dept: ENDOSCOPY | Age: 38
DRG: 637 | End: 2018-11-20
Payer: SELF-PAY

## 2018-11-20 ENCOUNTER — APPOINTMENT (OUTPATIENT)
Dept: CT IMAGING | Age: 38
DRG: 637 | End: 2018-11-20
Attending: SPECIALIST
Payer: SELF-PAY

## 2018-11-20 VITALS
HEIGHT: 69 IN | OXYGEN SATURATION: 100 % | HEART RATE: 79 BPM | SYSTOLIC BLOOD PRESSURE: 112 MMHG | RESPIRATION RATE: 20 BRPM | DIASTOLIC BLOOD PRESSURE: 65 MMHG | BODY MASS INDEX: 21.03 KG/M2 | WEIGHT: 142 LBS | TEMPERATURE: 98.1 F

## 2018-11-20 LAB
ANION GAP SERPL CALC-SCNC: 10 MMOL/L (ref 5–15)
APTT PPP: 23.4 SEC (ref 22.1–32)
BASOPHILS # BLD: 0 K/UL (ref 0–0.1)
BASOPHILS NFR BLD: 1 % (ref 0–1)
BUN SERPL-MCNC: 11 MG/DL (ref 6–20)
BUN/CREAT SERPL: 15 (ref 12–20)
CALCIUM SERPL-MCNC: 8.7 MG/DL (ref 8.5–10.1)
CHLORIDE SERPL-SCNC: 108 MMOL/L (ref 97–108)
CO2 SERPL-SCNC: 22 MMOL/L (ref 21–32)
CREAT SERPL-MCNC: 0.72 MG/DL (ref 0.7–1.3)
DIFFERENTIAL METHOD BLD: NORMAL
EOSINOPHIL # BLD: 0.2 K/UL (ref 0–0.4)
EOSINOPHIL NFR BLD: 2 % (ref 0–7)
ERYTHROCYTE [DISTWIDTH] IN BLOOD BY AUTOMATED COUNT: 13.2 % (ref 11.5–14.5)
GLUCOSE BLD STRIP.AUTO-MCNC: 125 MG/DL (ref 65–100)
GLUCOSE BLD STRIP.AUTO-MCNC: 155 MG/DL (ref 65–100)
GLUCOSE BLD STRIP.AUTO-MCNC: 215 MG/DL (ref 65–100)
GLUCOSE BLD STRIP.AUTO-MCNC: 38 MG/DL (ref 65–100)
GLUCOSE BLD STRIP.AUTO-MCNC: 84 MG/DL (ref 65–100)
GLUCOSE BLD STRIP.AUTO-MCNC: 93 MG/DL (ref 65–100)
GLUCOSE SERPL-MCNC: 94 MG/DL (ref 65–100)
HCT VFR BLD AUTO: 42.5 % (ref 36.6–50.3)
HGB BLD-MCNC: 15.1 G/DL (ref 12.1–17)
IMM GRANULOCYTES # BLD: 0 K/UL (ref 0–0.04)
IMM GRANULOCYTES NFR BLD AUTO: 0 % (ref 0–0.5)
INR PPP: 1 (ref 0.9–1.1)
LYMPHOCYTES # BLD: 2.3 K/UL (ref 0.8–3.5)
LYMPHOCYTES NFR BLD: 28 % (ref 12–49)
MAGNESIUM SERPL-MCNC: 1.8 MG/DL (ref 1.6–2.4)
MCH RBC QN AUTO: 32.2 PG (ref 26–34)
MCHC RBC AUTO-ENTMCNC: 35.5 G/DL (ref 30–36.5)
MCV RBC AUTO: 90.6 FL (ref 80–99)
MONOCYTES # BLD: 0.7 K/UL (ref 0–1)
MONOCYTES NFR BLD: 8 % (ref 5–13)
NEUTS SEG # BLD: 5.1 K/UL (ref 1.8–8)
NEUTS SEG NFR BLD: 61 % (ref 32–75)
NRBC # BLD: 0 K/UL (ref 0–0.01)
NRBC BLD-RTO: 0 PER 100 WBC
PHOSPHATE SERPL-MCNC: 2.1 MG/DL (ref 2.6–4.7)
PLATELET # BLD AUTO: 266 K/UL (ref 150–400)
PMV BLD AUTO: 10.4 FL (ref 8.9–12.9)
POTASSIUM SERPL-SCNC: 3.2 MMOL/L (ref 3.5–5.1)
PROTHROMBIN TIME: 10.6 SEC (ref 9–11.1)
RBC # BLD AUTO: 4.69 M/UL (ref 4.1–5.7)
SERVICE CMNT-IMP: ABNORMAL
SERVICE CMNT-IMP: NORMAL
SERVICE CMNT-IMP: NORMAL
SODIUM SERPL-SCNC: 140 MMOL/L (ref 136–145)
THERAPEUTIC RANGE,PTTT: NORMAL SECS (ref 58–77)
WBC # BLD AUTO: 8.3 K/UL (ref 4.1–11.1)

## 2018-11-20 PROCEDURE — 76060000031 HC ANESTHESIA FIRST 0.5 HR: Performed by: SPECIALIST

## 2018-11-20 PROCEDURE — 88312 SPECIAL STAINS GROUP 1: CPT

## 2018-11-20 PROCEDURE — 74011000255 HC RX REV CODE- 255: Performed by: EMERGENCY MEDICINE

## 2018-11-20 PROCEDURE — 77010033678 HC OXYGEN DAILY

## 2018-11-20 PROCEDURE — 74177 CT ABD & PELVIS W/CONTRAST: CPT

## 2018-11-20 PROCEDURE — 74011250636 HC RX REV CODE- 250/636: Performed by: INTERNAL MEDICINE

## 2018-11-20 PROCEDURE — 76040000019: Performed by: SPECIALIST

## 2018-11-20 PROCEDURE — 85730 THROMBOPLASTIN TIME PARTIAL: CPT

## 2018-11-20 PROCEDURE — 85025 COMPLETE CBC W/AUTO DIFF WBC: CPT

## 2018-11-20 PROCEDURE — C9113 INJ PANTOPRAZOLE SODIUM, VIA: HCPCS | Performed by: INTERNAL MEDICINE

## 2018-11-20 PROCEDURE — 36415 COLL VENOUS BLD VENIPUNCTURE: CPT

## 2018-11-20 PROCEDURE — 84100 ASSAY OF PHOSPHORUS: CPT

## 2018-11-20 PROCEDURE — 88305 TISSUE EXAM BY PATHOLOGIST: CPT

## 2018-11-20 PROCEDURE — 77030019988 HC FCPS ENDOSC DISP BSC -B: Performed by: SPECIALIST

## 2018-11-20 PROCEDURE — 80048 BASIC METABOLIC PNL TOTAL CA: CPT

## 2018-11-20 PROCEDURE — 74011250637 HC RX REV CODE- 250/637: Performed by: EMERGENCY MEDICINE

## 2018-11-20 PROCEDURE — 74011250636 HC RX REV CODE- 250/636: Performed by: EMERGENCY MEDICINE

## 2018-11-20 PROCEDURE — 85610 PROTHROMBIN TIME: CPT

## 2018-11-20 PROCEDURE — 74011000250 HC RX REV CODE- 250: Performed by: EMERGENCY MEDICINE

## 2018-11-20 PROCEDURE — 0DB98ZX EXCISION OF DUODENUM, VIA NATURAL OR ARTIFICIAL OPENING ENDOSCOPIC, DIAGNOSTIC: ICD-10-PCS | Performed by: SPECIALIST

## 2018-11-20 PROCEDURE — 88112 CYTOPATH CELL ENHANCE TECH: CPT

## 2018-11-20 PROCEDURE — 82962 GLUCOSE BLOOD TEST: CPT

## 2018-11-20 PROCEDURE — 74011636637 HC RX REV CODE- 636/637: Performed by: EMERGENCY MEDICINE

## 2018-11-20 PROCEDURE — 74011636320 HC RX REV CODE- 636/320: Performed by: EMERGENCY MEDICINE

## 2018-11-20 PROCEDURE — 83735 ASSAY OF MAGNESIUM: CPT

## 2018-11-20 PROCEDURE — 74011000250 HC RX REV CODE- 250: Performed by: INTERNAL MEDICINE

## 2018-11-20 PROCEDURE — 0DB38ZX EXCISION OF LOWER ESOPHAGUS, VIA NATURAL OR ARTIFICIAL OPENING ENDOSCOPIC, DIAGNOSTIC: ICD-10-PCS | Performed by: SPECIALIST

## 2018-11-20 PROCEDURE — 74011250636 HC RX REV CODE- 250/636

## 2018-11-20 RX ORDER — SODIUM CHLORIDE 9 MG/ML
50 INJECTION, SOLUTION INTRAVENOUS CONTINUOUS
Status: DISPENSED | OUTPATIENT
Start: 2018-11-20 | End: 2018-11-20

## 2018-11-20 RX ORDER — DEXTROMETHORPHAN/PSEUDOEPHED 2.5-7.5/.8
1.2 DROPS ORAL
Status: DISCONTINUED | OUTPATIENT
Start: 2018-11-20 | End: 2018-11-20 | Stop reason: HOSPADM

## 2018-11-20 RX ORDER — FLUMAZENIL 0.1 MG/ML
0.2 INJECTION INTRAVENOUS
Status: DISCONTINUED | OUTPATIENT
Start: 2018-11-20 | End: 2018-11-20 | Stop reason: HOSPADM

## 2018-11-20 RX ORDER — SODIUM CHLORIDE 9 MG/ML
50 INJECTION, SOLUTION INTRAVENOUS CONTINUOUS
Status: DISCONTINUED | OUTPATIENT
Start: 2018-11-20 | End: 2018-11-20 | Stop reason: SDUPTHER

## 2018-11-20 RX ORDER — NALOXONE HYDROCHLORIDE 0.4 MG/ML
0.4 INJECTION, SOLUTION INTRAMUSCULAR; INTRAVENOUS; SUBCUTANEOUS
Status: DISCONTINUED | OUTPATIENT
Start: 2018-11-20 | End: 2018-11-20 | Stop reason: SDUPTHER

## 2018-11-20 RX ORDER — SODIUM CHLORIDE 0.9 % (FLUSH) 0.9 %
5-10 SYRINGE (ML) INJECTION AS NEEDED
Status: DISCONTINUED | OUTPATIENT
Start: 2018-11-20 | End: 2018-11-20 | Stop reason: SDUPTHER

## 2018-11-20 RX ORDER — MIDAZOLAM HYDROCHLORIDE 1 MG/ML
.25-5 INJECTION, SOLUTION INTRAMUSCULAR; INTRAVENOUS
Status: DISCONTINUED | OUTPATIENT
Start: 2018-11-20 | End: 2018-11-20 | Stop reason: HOSPADM

## 2018-11-20 RX ORDER — SODIUM CHLORIDE 0.9 % (FLUSH) 0.9 %
10 SYRINGE (ML) INJECTION
Status: DISCONTINUED | OUTPATIENT
Start: 2018-11-20 | End: 2018-11-20 | Stop reason: HOSPADM

## 2018-11-20 RX ORDER — INSULIN LISPRO 100 [IU]/ML
5 INJECTION, SOLUTION INTRAVENOUS; SUBCUTANEOUS
Status: DISCONTINUED | OUTPATIENT
Start: 2018-11-20 | End: 2018-11-20 | Stop reason: HOSPADM

## 2018-11-20 RX ORDER — PANTOPRAZOLE SODIUM 40 MG/1
40 TABLET, DELAYED RELEASE ORAL
Qty: 30 TAB | Refills: 0 | Status: SHIPPED | OUTPATIENT
Start: 2018-11-20 | End: 2018-12-20

## 2018-11-20 RX ORDER — DEXTROSE 50 % IN WATER (D50W) INTRAVENOUS SYRINGE
Status: DISPENSED
Start: 2018-11-20 | End: 2018-11-20

## 2018-11-20 RX ORDER — SODIUM CHLORIDE 9 MG/ML
50 INJECTION, SOLUTION INTRAVENOUS
Status: DISCONTINUED | OUTPATIENT
Start: 2018-11-20 | End: 2018-11-20 | Stop reason: HOSPADM

## 2018-11-20 RX ORDER — FLUMAZENIL 0.1 MG/ML
0.2 INJECTION INTRAVENOUS
Status: DISCONTINUED | OUTPATIENT
Start: 2018-11-20 | End: 2018-11-20 | Stop reason: SDUPTHER

## 2018-11-20 RX ORDER — DEXTROSE 50 % IN WATER (D50W) INTRAVENOUS SYRINGE
25-50 AS NEEDED
Status: DISCONTINUED | OUTPATIENT
Start: 2018-11-20 | End: 2018-11-20 | Stop reason: HOSPADM

## 2018-11-20 RX ORDER — SODIUM CHLORIDE 0.9 % (FLUSH) 0.9 %
5-10 SYRINGE (ML) INJECTION AS NEEDED
Status: ACTIVE | OUTPATIENT
Start: 2018-11-20 | End: 2018-11-20

## 2018-11-20 RX ORDER — PROPOFOL 10 MG/ML
INJECTION, EMULSION INTRAVENOUS AS NEEDED
Status: DISCONTINUED | OUTPATIENT
Start: 2018-11-20 | End: 2018-11-20 | Stop reason: HOSPADM

## 2018-11-20 RX ORDER — INSULIN GLARGINE 100 [IU]/ML
30 INJECTION, SOLUTION SUBCUTANEOUS DAILY
Status: DISCONTINUED | OUTPATIENT
Start: 2018-11-20 | End: 2018-11-20 | Stop reason: HOSPADM

## 2018-11-20 RX ORDER — SODIUM CHLORIDE 0.9 % (FLUSH) 0.9 %
5-10 SYRINGE (ML) INJECTION EVERY 8 HOURS
Status: DISPENSED | OUTPATIENT
Start: 2018-11-20 | End: 2018-11-20

## 2018-11-20 RX ORDER — SODIUM CHLORIDE, SODIUM LACTATE, POTASSIUM CHLORIDE, CALCIUM CHLORIDE 600; 310; 30; 20 MG/100ML; MG/100ML; MG/100ML; MG/100ML
INJECTION, SOLUTION INTRAVENOUS
Status: DISCONTINUED | OUTPATIENT
Start: 2018-11-20 | End: 2018-11-20 | Stop reason: HOSPADM

## 2018-11-20 RX ORDER — FENTANYL CITRATE 50 UG/ML
50 INJECTION, SOLUTION INTRAMUSCULAR; INTRAVENOUS
Status: DISCONTINUED | OUTPATIENT
Start: 2018-11-20 | End: 2018-11-20 | Stop reason: HOSPADM

## 2018-11-20 RX ORDER — INSULIN LISPRO 100 [IU]/ML
INJECTION, SOLUTION INTRAVENOUS; SUBCUTANEOUS
Status: DISCONTINUED | OUTPATIENT
Start: 2018-11-20 | End: 2018-11-20 | Stop reason: HOSPADM

## 2018-11-20 RX ORDER — POTASSIUM CHLORIDE 750 MG/1
40 TABLET, FILM COATED, EXTENDED RELEASE ORAL
Status: COMPLETED | OUTPATIENT
Start: 2018-11-20 | End: 2018-11-20

## 2018-11-20 RX ORDER — MAGNESIUM SULFATE 100 %
4 CRYSTALS MISCELLANEOUS AS NEEDED
Status: DISCONTINUED | OUTPATIENT
Start: 2018-11-20 | End: 2018-11-20 | Stop reason: HOSPADM

## 2018-11-20 RX ORDER — SODIUM CHLORIDE 0.9 % (FLUSH) 0.9 %
5-10 SYRINGE (ML) INJECTION EVERY 8 HOURS
Status: DISCONTINUED | OUTPATIENT
Start: 2018-11-20 | End: 2018-11-20 | Stop reason: SDUPTHER

## 2018-11-20 RX ORDER — EPINEPHRINE 0.1 MG/ML
1 INJECTION INTRACARDIAC; INTRAVENOUS
Status: DISCONTINUED | OUTPATIENT
Start: 2018-11-20 | End: 2018-11-20 | Stop reason: HOSPADM

## 2018-11-20 RX ORDER — LIDOCAINE HYDROCHLORIDE 20 MG/ML
INJECTION, SOLUTION EPIDURAL; INFILTRATION; INTRACAUDAL; PERINEURAL AS NEEDED
Status: DISCONTINUED | OUTPATIENT
Start: 2018-11-20 | End: 2018-11-20 | Stop reason: HOSPADM

## 2018-11-20 RX ORDER — DEXTROSE 50 % IN WATER (D50W) INTRAVENOUS SYRINGE
25 AS NEEDED
Status: DISCONTINUED | OUTPATIENT
Start: 2018-11-20 | End: 2018-11-20 | Stop reason: HOSPADM

## 2018-11-20 RX ORDER — GABAPENTIN 100 MG/1
200 CAPSULE ORAL
Qty: 60 CAP | Refills: 0 | Status: SHIPPED | OUTPATIENT
Start: 2018-11-20 | End: 2018-12-20

## 2018-11-20 RX ORDER — ATROPINE SULFATE 0.1 MG/ML
0.5 INJECTION INTRAVENOUS
Status: DISCONTINUED | OUTPATIENT
Start: 2018-11-20 | End: 2018-11-20 | Stop reason: HOSPADM

## 2018-11-20 RX ORDER — BARIUM SULFATE 20 MG/ML
900 SUSPENSION ORAL
Status: COMPLETED | OUTPATIENT
Start: 2018-11-20 | End: 2018-11-20

## 2018-11-20 RX ORDER — NALOXONE HYDROCHLORIDE 0.4 MG/ML
0.4 INJECTION, SOLUTION INTRAMUSCULAR; INTRAVENOUS; SUBCUTANEOUS
Status: DISCONTINUED | OUTPATIENT
Start: 2018-11-20 | End: 2018-11-20 | Stop reason: HOSPADM

## 2018-11-20 RX ADMIN — INSULIN GLARGINE 30 UNITS: 100 INJECTION, SOLUTION SUBCUTANEOUS at 15:01

## 2018-11-20 RX ADMIN — Medication 10 ML: at 13:14

## 2018-11-20 RX ADMIN — PROPOFOL 30 MG: 10 INJECTION, EMULSION INTRAVENOUS at 10:46

## 2018-11-20 RX ADMIN — IOPAMIDOL 100 ML: 755 INJECTION, SOLUTION INTRAVENOUS at 14:05

## 2018-11-20 RX ADMIN — PROPOFOL 30 MG: 10 INJECTION, EMULSION INTRAVENOUS at 10:51

## 2018-11-20 RX ADMIN — BARIUM SULFATE 900 ML: 20 SUSPENSION ORAL at 12:09

## 2018-11-20 RX ADMIN — PROPOFOL 100 MG: 10 INJECTION, EMULSION INTRAVENOUS at 10:41

## 2018-11-20 RX ADMIN — HYDROCODONE BITARTRATE AND ACETAMINOPHEN 1 TABLET: 5; 325 TABLET ORAL at 12:08

## 2018-11-20 RX ADMIN — POTASSIUM CHLORIDE 40 MEQ: 750 TABLET, EXTENDED RELEASE ORAL at 15:01

## 2018-11-20 RX ADMIN — PROPOFOL 50 MG: 10 INJECTION, EMULSION INTRAVENOUS at 10:44

## 2018-11-20 RX ADMIN — INSULIN LISPRO 5 UNITS: 100 INJECTION, SOLUTION INTRAVENOUS; SUBCUTANEOUS at 18:50

## 2018-11-20 RX ADMIN — INSULIN LISPRO 3 UNITS: 100 INJECTION, SOLUTION INTRAVENOUS; SUBCUTANEOUS at 17:03

## 2018-11-20 RX ADMIN — LIDOCAINE HYDROCHLORIDE 100 MG: 20 INJECTION, SOLUTION EPIDURAL; INFILTRATION; INTRACAUDAL; PERINEURAL at 10:41

## 2018-11-20 RX ADMIN — SODIUM CHLORIDE, SODIUM LACTATE, POTASSIUM CHLORIDE, CALCIUM CHLORIDE: 600; 310; 30; 20 INJECTION, SOLUTION INTRAVENOUS at 10:24

## 2018-11-20 RX ADMIN — PROPOFOL 30 MG: 10 INJECTION, EMULSION INTRAVENOUS at 10:48

## 2018-11-20 RX ADMIN — PROPOFOL 30 MG: 10 INJECTION, EMULSION INTRAVENOUS at 10:54

## 2018-11-20 RX ADMIN — DEXTROSE MONOHYDRATE 25 G: 25 INJECTION, SOLUTION INTRAVENOUS at 01:20

## 2018-11-20 RX ADMIN — SODIUM CHLORIDE 75 ML/HR: 900 INJECTION, SOLUTION INTRAVENOUS at 07:26

## 2018-11-20 RX ADMIN — HYDROCODONE BITARTRATE AND ACETAMINOPHEN 1 TABLET: 5; 325 TABLET ORAL at 18:59

## 2018-11-20 RX ADMIN — PROPOFOL 50 MG: 10 INJECTION, EMULSION INTRAVENOUS at 10:42

## 2018-11-20 RX ADMIN — SODIUM CHLORIDE 40 MG: 9 INJECTION INTRAMUSCULAR; INTRAVENOUS; SUBCUTANEOUS at 12:04

## 2018-11-20 RX ADMIN — POTASSIUM PHOSPHATE, MONOBASIC AND POTASSIUM PHOSPHATE, DIBASIC: 224; 236 INJECTION, SOLUTION INTRAVENOUS at 13:13

## 2018-11-20 NOTE — PROGRESS NOTES
See op note    Rec:  Diabetic diet  Ct abdomen pelvis  ppi  rx anti fungal if brushing+  Will follow one more day    Ella Hinkle MD  11:02 AM  11/20/2018

## 2018-11-20 NOTE — PROGRESS NOTES
TRANSFER - OUT REPORT:    Verbal report given to ASAD Godwin(name) on Douglas Nyhan  being transferred to [facility](unit) for routine progression of care       Report consisted of patients Situation, Background, Assessment and   Recommendations(SBAR). Information from the following report(s) SBAR, Procedure Summary, Intake/Output, Recent Results and Med Rec Status was reviewed with the receiving nurse. Lines:   Peripheral IV 11/18/18 Right Antecubital (Active)   Site Assessment Clean, dry, & intact 11/20/2018  9:55 AM   Phlebitis Assessment 0 11/20/2018  9:55 AM   Infiltration Assessment 0 11/20/2018  9:55 AM   Dressing Status Clean, dry, & intact 11/20/2018  9:55 AM   Dressing Type Tape;Transparent 11/20/2018  9:55 AM   Hub Color/Line Status Pink 11/20/2018  9:55 AM       Peripheral IV 11/18/18 Left Forearm (Active)   Site Assessment Clean, dry, & intact 11/20/2018  7:09 AM   Phlebitis Assessment 0 11/20/2018  7:09 AM   Infiltration Assessment 0 11/20/2018  7:09 AM   Dressing Status Clean, dry, & intact 11/20/2018  7:09 AM   Dressing Type Tape;Transparent 11/20/2018  7:09 AM   Hub Color/Line Status Pink;Capped 11/20/2018  7:09 AM        Opportunity for questions and clarification was provided.

## 2018-11-20 NOTE — PROGRESS NOTES
The risks and benefits of the bite block have been explained to patient. Patient verbalizes understanding. Patient has a tongue ring; will remove before start of EGD.

## 2018-11-20 NOTE — DIABETES MGMT
DTC Progress Note    Recommendations/ Comments: Chart reviewed for hypoglycemia overnight- note Lantus dose decreased for tonight. Please consider adding Lispro Correctional insulin with normal sensitivity      Chart reviewed on Jose Antonio Macario. Patient is a 30 y.o. male with Type 1 diabetes on intensive insulin regimen at home. A1c:   Lab Results   Component Value Date/Time    Hemoglobin A1c 13.7 (H) 11/18/2018 08:17 PM    Hemoglobin A1c 13.5 (H) 09/04/2018 05:45 AM       Recent Glucose Results:   Lab Results   Component Value Date/Time    GLU 94 11/20/2018 03:44 AM     (H) 11/19/2018 06:51 PM    GLUCPOC 125 (H) 11/20/2018 12:03 PM    GLUCPOC 84 11/20/2018 07:30 AM    GLUCPOC 93 11/20/2018 03:50 AM        Lab Results   Component Value Date/Time    Creatinine 0.72 11/20/2018 03:44 AM     Estimated Creatinine Clearance: 130.4 mL/min (based on SCr of 0.72 mg/dL). Active Orders   Diet    DIET DIABETIC CONSISTENT CARB Regular; 2 GM NA (House Low NA)        PO intake:   Patient Vitals for the past 72 hrs:   % Diet Eaten   11/20/18 1327 0 %   11/20/18 0930 0 %   11/19/18 1205 0 %   11/19/18 1105 0 %       Current hospital DM medication: Lantus 30 units daily    Will continue to follow as needed. Thank you.   Toney Kinney, 68 [address], [address]  Office:  [phone]

## 2018-11-20 NOTE — PROGRESS NOTES
Anesthesia reports 320 mg Propofol, 100 mg Lidocaine and 250 ml of Normal Saline were given during procedure. Received report from anesthesia staff on vital signs and status of patient.

## 2018-11-20 NOTE — PROGRESS NOTES
Bedside shift change report given to Ab Townsend (oncoming nurse) by Cesilia Mobley (offgoing nurse). Report included the following information SBAR, Kardex, Intake/Output, MAR, Recent Results and Cardiac Rhythm NSR.     0945- Pt taken to Endo via stretcher. 1220- Pt back in room. VSS. Pt will have contrast for CT then lunch will be provided post CT scan. 1900- Discharge instructions and prescriptions reviewed with pt. Opportunity for questions provided. PIVs removed. Night shift RN notified that pt will only need taken to entrance for DC when ride gets here.

## 2018-11-20 NOTE — PROGRESS NOTES
TRANSFER - IN REPORT:    Verbal report received from [place] (name) on Douglas Nyhan  being received from PCU room [id number] (unit) for ordered procedure      Report consisted of patients Situation, Background, Assessment and   Recommendations(SBAR). Information from the following report(s) SBAR, Intake/Output, MAR and Recent Results was reviewed with the receiving nurse. Opportunity for questions and clarification was provided. Will give report to primary Endoscopy nurse upon arrival to unit.

## 2018-11-20 NOTE — DISCHARGE INSTRUCTIONS
HOSPITALIST DISCHARGE INSTRUCTIONS    NAME: Angel Barragan   :  1980   MRN:  [id number]     Date/Time:  2018 6:21 PM    ADMIT DATE: 2018   DISCHARGE DATE: 2018         · It is important that you take the medication exactly as they are prescribed. · Keep your medication in the bottles provided by the pharmacist and keep a list of the medication names, dosages, and times to be taken in your wallet. · Do not take other medications without consulting your doctor. What to do at Home    Recommended diet:  Diabetic Diet    Recommended activity: Activity as tolerated      If you have questions regarding the hospital related prescriptions or hospital related issues please call [facility] at [phone]. You can always direct your questions to your primary care doctor if you are unable to reach your hospital physician; your PCP works as an extension of your hospital doctor just like your hospital doctor is an extension of your PCP for your time at the hospital [facility], [facility])    If you experience any of the following symptoms then please call your primary care physician or return to the emergency room if you cannot get hold of your doctor:    Fever, chills, nausea, vomiting, or persistent diarrhea  Worsening weakness or new problems with your speech or balance  Dark stools or visible blood in your stools  New Leg swelling or shortness of breath as these could be signs of a clot    Additional Instructions:      Bring these papers with you to your follow up appointments. The papers will help your doctors be sure to continue the care plan from the hospital.    F/u with new pcp in 1 week  F/u with gi in 2 weeks  F/u with urology in 2-4weeks  Return to the ER vomit blood          Information obtained by :  I understand that if any problems occur once I am at home I am to contact my physician. I understand and acknowledge receipt of the instructions indicated above. Physician's or R.N.'s Signature                                                                  Date/Time                                                                                                                                              Patient or Representative Signature

## 2018-11-20 NOTE — ROUTINE PROCESS
Court Villegas  1980  [id number]    Situation:  Verbal report received from: Jm Trinidad RN  Procedure: Procedure(s):  ESOPHAGOGASTRODUODENOSCOPY (EGD)  ESOPHAGOGASTRODUODENAL (EGD) BIOPSY  ENDOSCOPIC BRUSHING    Background:    Preoperative diagnosis: Intractable vomiting with nausea, unspecified vomiting type [R11.2]  Postoperative diagnosis: distal esophageal ulcer; mass effect on posterior stomach     :  Dr. Aristides Serrato  Assistant(s): Endoscopy RN-1: Nico Martinez RN; Iqra Mercado RN    Specimens:   ID Type Source Tests Collected by Time Destination   1 : duodenum bx Preservative   Edilson Cobb MD 11/20/2018 1048 Pathology   1 : esophageal brushing Fresh Esophageal Brushing  Edilson Cobb MD 11/20/2018 1057 Cytology     H. Pylori  no    Assessment:  Intra-procedure medications     Anesthesia gave intra-procedure sedation and medications, see anesthesia flow sheet     Intravenous fluids: NS@ KVO     Vital signs stable     Abdominal assessment: round and soft     Recommendation:    Return to floor   Family or Friend   Permission to share finding with family or friend

## 2018-11-20 NOTE — ANESTHESIA POSTPROCEDURE EVALUATION
Procedure(s):  ESOPHAGOGASTRODUODENOSCOPY (EGD)  ESOPHAGOGASTRODUODENAL (EGD) BIOPSY  ENDOSCOPIC BRUSHING. Anesthesia Post Evaluation        Patient location during evaluation: PACU  Note status: Adequate. Level of consciousness: responsive to verbal stimuli and sleepy but conscious  Pain management: satisfactory to patient  Airway patency: patent  Anesthetic complications: no  Cardiovascular status: acceptable  Respiratory status: acceptable  Hydration status: acceptable  Comments: +Post-Anesthesia Evaluation and Assessment    Patient: Shara Quispe MRN: [id number]  SSN: [id number]   YOB: 1980  Age: 30 y.o. Sex: male      Cardiovascular Function/Vital Signs    BP (!) 128/92   Pulse 81   Temp 36.9 °C (98.4 °F)   Resp 20   Ht 5' 9\" (1.753 m)   Wt 64.4 kg (142 lb)   SpO2 100%   BMI 20.97 kg/m²     Patient is status post Procedure(s):  ESOPHAGOGASTRODUODENOSCOPY (EGD)  ESOPHAGOGASTRODUODENAL (EGD) BIOPSY  ENDOSCOPIC BRUSHING. Nausea/Vomiting: Controlled. Postoperative hydration reviewed and adequate. Pain:  Pain Scale 1: Numeric (0 - 10) (11/20/18 1143)  Pain Intensity 1: 8 (11/20/18 1143)   Managed. Neurological Status: At baseline. Mental Status and Level of Consciousness: Arousable. Pulmonary Status:   O2 Device: Room air (11/20/18 1143)   Adequate oxygenation and airway patent. Complications related to anesthesia: None    Post-anesthesia assessment completed. No concerns.     Signed By: Shasha Argueta MD    11/20/2018  Post anesthesia nausea and vomiting:  controlled      Visit Vitals  BP (!) 128/92   Pulse 81   Temp 36.9 °C (98.4 °F)   Resp 20   Ht 5' 9\" (1.753 m)   Wt 64.4 kg (142 lb)   SpO2 100%   BMI 20.97 kg/m²

## 2018-11-20 NOTE — ANESTHESIA PREPROCEDURE EVALUATION
Anesthetic History   No history of anesthetic complications            Review of Systems / Medical History  Patient summary reviewed, nursing notes reviewed and pertinent labs reviewed    Pulmonary          Smoker (16 pack years)         Neuro/Psych     seizures    Psychiatric history (Bipolar disorder,  Depression)     Cardiovascular    Hypertension: well controlled              Exercise tolerance: <4 METS     GI/Hepatic/Renal         Renal disease (DKA): ARF      Comments: GI bleeding Endo/Other    Diabetes (Diabetic neuropathy;   DKA 5 times)    Anemia (Hb 9.1)     Other Findings   Comments: NON-COMPLIANT with medical treatment, presenting hazards to health  Neuropathy           Physical Exam    Airway  Mallampati: II  TM Distance: > 6 cm  Neck ROM: normal range of motion   Mouth opening: Normal     Cardiovascular  Regular rate and rhythm,  S1 and S2 normal,  no murmur, click, rub, or gallop  Rhythm: regular  Rate: normal         Dental    Dentition: Poor dentition  Comments: Multiple missing teeth   Pulmonary  Breath sounds clear to auscultation               Abdominal  GI exam deferred       Other Findings            Anesthetic Plan    ASA: 3  Anesthesia type: general and total IV anesthesia          Induction: Intravenous  Anesthetic plan and risks discussed with: Patient      Propofol MAC

## 2018-11-20 NOTE — PROGRESS NOTES
LOC: The patient is awake, alert and aware of environment with an appropriate affect, the patient is oriented x 3 and speaking appropriately.  APPEARANCE: Patient resting comfortably and in no acute distress, patient is clean and well groomed, patient's clothing is properly fastened.  SKIN: The skin is warm and dry, patient has normal skin turgor and moist mucus membranes, skin intact, bruising noted throughout body  MUSKULOSKELETAL: Patient moving all extremities well, no obvious swelling or deformities noted.  RESPIRATORY: Airway is open and patent, respirations are spontaneous, patient has a normal effort and rate. Breath sounds are clear and equal bilaterally.  CARDIAC: Normal heart sounds. No peripheral edema.  ABDOMEN: Soft and non tender to palpation, no distention noted. Bowel sounds present.   Spoke with patient and he states he has insurance and his girlfriend will bring the card up tonight. Informed nursing to make a copy of the card and to fax it to [facility] at [phone] and to place a copy of the card on patient's chart.

## 2018-11-20 NOTE — PROGRESS NOTES
F/U for hematemesis  Nausea and vomiting    S: Mr. Marry Castleman was seen by me today during rounds. At this time, he is resting + comfortably. The patient has no new complaints today. Please see admission consult for details of ROS; there are + changes today. No nausea, no vomiting. No hematemesis. He admits that improvement seems to track with blood sugar improvement. O: Blood pressure 127/76, pulse 79, temperature 98.3 °F (36.8 °C), resp. rate 18, height 5' 9\" (1.753 m), weight 64.4 kg (142 lb), SpO2 99 %. Gen: Patient is in no acute distress. Thin, multiple piercings and tattoos. There is no jaundice. Lungs: Clear to auscultation bilaterally . Heart:+RRR. Abd: Soft, non tender, non-distended, bowel sounds present. Extremities: Warm. Cross sectional imaging:  None new      Lab Results   Component Value Date/Time    WBC 8.3 11/20/2018 03:44 AM    Hemoglobin (POC) 18.4 (H) 03/07/2017 09:00 AM    HGB 15.1 11/20/2018 03:44 AM    Hematocrit (POC) 54 (H) 03/07/2017 09:00 AM    HCT 42.5 11/20/2018 03:44 AM    PLATELET 575 44/08/4355 03:44 AM    MCV 90.6 11/20/2018 03:44 AM     Lab Results   Component Value Date/Time    Sodium 140 11/20/2018 03:44 AM    Potassium 3.2 (L) 11/20/2018 03:44 AM    Chloride 108 11/20/2018 03:44 AM    CO2 22 11/20/2018 03:44 AM    Anion gap 10 11/20/2018 03:44 AM    Glucose 94 11/20/2018 03:44 AM    BUN 11 11/20/2018 03:44 AM    Creatinine 0.72 11/20/2018 03:44 AM    BUN/Creatinine ratio 15 11/20/2018 03:44 AM    GFR est AA >60 11/20/2018 03:44 AM    GFR est non-AA >60 11/20/2018 03:44 AM    Calcium 8.7 11/20/2018 03:44 AM    Bilirubin, total 0.4 11/19/2018 03:25 AM    AST (SGOT) 15 11/19/2018 03:25 AM    Alk.  phosphatase 124 (H) 11/19/2018 03:25 AM    Protein, total 6.5 11/19/2018 03:25 AM    Albumin 3.3 (L) 11/19/2018 03:25 AM    Globulin 3.2 11/19/2018 03:25 AM    A-G Ratio 1.0 (L) 11/19/2018 03:25 AM    ALT (SGPT) 36 11/19/2018 03:25 AM      A: Active Problems:    HTN (hypertension) (3/13/2011)      Tobacco abuse (8/18/2011)      DKA, type 1 ([address] (11/18/2018)      Neuropathy (11/18/2018)      Upper GI bleed (11/18/2018)        Comment:  stable  P:  egd today   I discussed with him the objectives, risks, consequences and alternatives to the procedure.       Fabio Membreno MD  8:06 AM  11/20/2018

## 2018-11-20 NOTE — PROCEDURES
Esophagogastroduodenoscopy    Indications:  Nausea, vomiting, hematemesis    Medications:  See anesthesia form    Post procedure diagnosis:  distal esophageal ulcer; mass effect on posterior stomach     Description of Procedure:    Prior to the procedure its objectives, risks, consequences and alternatives were discussed with the patient who then elected to proceed. The Olympus video endoscope was inserted under direct vision into the mouth and then into the esophagus. The esophagus looked normal to the distal esophagus. There was a one cm white ulcer at the distal esophagus. This may be candida or may be a healing Lillie-Estevez tear. I took brushings. The z-line was located at 39 cm. There were no diagnostic mucosal abnormalities of the body, fundus, antrum, cardia and incisura of the stomach. This included direct and retroflexion examination. There seemed to be an impression on the mucosa along the posterior wall of the stomach from the mid body to the mid antrum. The first and second portion of the duodenum appeared normal.  I took biopsies of the duodenum and of the stomach. Complications: There were no apparent complications and the patient tolerated the procedure well. Estimated Blood Loss:  none  Specimens Removed:  Duodenum  Stomach  Distal esophageal brush cytology  Impressions:   Mass effect on stomach --will check ct  Distal esophageal ulcer--M-W healing or yeast      Signed By: Obi Park MD                        November 20, 2018     10:59 AM

## 2018-11-20 NOTE — PROGRESS NOTES
New Patient First Initial PCP MELL appt scheduled with Dr. Dorota Javed on 11/28/2018 at 10:00am. Appt added to AVS. Jessica Gaitan CM Specialist

## 2018-11-20 NOTE — DISCHARGE SUMMARY
Hospitalist Discharge Summary     Patient ID:  Court Villegas  [id number]  30 y.o.  1980    PCP on record: None    Admit date: 11/18/2018  Discharge date and time: 11/20/2018      DISCHARGE DIAGNOSIS:    DKA  Type 1 diabetes with hyperglycemia  Nausea and vomiting, resolved  Hematemesis, suspected secondary to Lillie-Estevez tear  Diabetic peripheral neuropathy  History of esophagitis  SIRS S secondary to DKA, no  sign of infection  Tobacco use  Proteinuria  hypok        CONSULTATIONS:  IP CONSULT TO HOSPITALIST  IP CONSULT TO GASTROENTEROLOGY    Excerpted HPI from H&P of Andreia Baer MD:  CHIEF COMPLAINT: *nausea, vomiting     HISTORY OF PRESENT ILLNESS:     Kermitt Lombard is a 30 y.o.  male who presents with nausea and vomiting. As per patient, he started to have nausea, vomiting last night and today he noted blood in vomiting. Pt denies any fever, chills, dark stools, cough, shortness of breath, problems urination, chest pain, abdominal pain, diarrhea. In ED pt noted to have metabolic acidosis and elevated anion gap and .      We were asked to admit for work up and evaluation of the above problems.            ______________________________________________________________________  DISCHARGE SUMMARY/HOSPITAL COURSE:  for full details see H&P, daily progress notes, labs, consult notes.      DKA, type 1  Admit to PCU  Given 1L fluid bolus in ED, will give another liter and start IVF at 150ml/hr  Insulin drip  Add K once K < 3.5   Hold off on Bicarb ordered by ED consider acidosis related to DKA  NPO with ice chip and meds only for now  , will add D5 to the fluid as suspect blood sugar will drop some with fluid boluses and need IV insulin for DKA  Q4H BMP and Mg  -n improved no more v, AG resolved, plans for EGD in am  -will transition drip to sq, give lantus 35 now and start ssi, add mealtime humalog depending on repeat BG at dinner time  -check bmp at 6pm  -DM consulted  -case management consult for pcp  -a1c 13.7  -tolerating diet and back on SQ insulin since yesterday     Upper GI bleed  Hx of esophagitis  Hb stable, could be due to dehydration with DKA  Suspect Lillie Siddhartha Tear considering vomiting started first and then vomiting blood  NPO as above, adv to clears now, npo after MN  IV PPIs  GI appreciate, plans for EGD this am  See discussion below with EGD/CT results     SIRs   Due to DKA  Check UA- neg for infection  Afebrile     Nausea, Vomiting  Due to DKA induced gastroparesis  If un resolves as DKA resolve then may need further workup  GI consult as above   PRN Zofran, IVF, NPO as above  resolved     Tobacco abuse  Decline nicotine patch     Neuropathy   Continue neurontin           Recommended Disposition:     Patient had an endoscopy today to evaluate the cause of his hematemesis. There was a distal esophageal ulcer consistent with the possibility of a healing Lillie-Estevez tear versus Candida. GI took some biopsies of the duodenum and stomach. There was some mass-effect on the posterior stomach so GI ordered a CAT scan of his abdomen and pelvis. This showed moderate thickening of the wall of the urinary bladder no definitive abnormality in the GE junction and was otherwise negative    I discussed the case with Dr. Clarissa Kasper who felt that he was okay for discharge today, will need to follow-up with GI as an outpatient continue PPI. I discussed the CT findings with the patient including the bladder wall thickening. he does admit to frequent urination but no pain or burning or history of infection. I discussed that he is to follow-up with urology as an outpatient and may need a cystoscopy to evaluate further. Patient did have a urinalysis during this hospital stay that showed no evidence of infection. Patient will be discharged home today. Case management did arrange a follow-up with him with the primary care physician on November 28.   I have also asked him to follow-up with Dr. Agnes Hawkins as GI in 2 weeks and with urology. Given that it was the evening, we were unable to make the appointments for him but will give him the information for GI and urology. The primary care physician scheduled already. _______________________________________________________________________  Patient seen and examined by me on discharge day. Pertinent Findings:  Patient feels well no abdominal pain no nausea vomiting is eager to go home. He tolerated his dinner. He has not had any further vomiting of blood. He denies any urinary symptoms or respiratory symptoms. Patient is awake alert ambulating in room no distress other than the pink mucous members moist cardiovascular regular rate lungs are clear abdomen is benign nontender no bladder distention or tenderness is palpated extremities no clubbing cyanosis or edema  _______________________________________________________________________  DISCHARGE MEDICATIONS:   Current Discharge Medication List      CONTINUE these medications which have CHANGED    Details   gabapentin (NEURONTIN) 100 mg capsule Take 2 Caps by mouth nightly for 30 days. Qty: 60 Cap, Refills: 0      pantoprazole (PROTONIX) 40 mg tablet Take 1 Tab by mouth Daily (before breakfast) for 30 days. Qty: 30 Tab, Refills: 0         CONTINUE these medications which have NOT CHANGED    Details   !! insulin regular (NOVOLIN R, HUMULIN R) 100 unit/mL injection 8 units 3 times a day before meals. Qty: 1 Vial, Refills: 0    Associated Diagnoses: IDDM (insulin dependent diabetes mellitus) ([facility])      OTHER Glucometer from [address]      These are over the counter and very affordable  Qty: 1 Units, Refills: 1      acetaminophen (TYLENOL EXTRA STRENGTH) 500 mg tablet Take 1,000 mg by mouth two (2) times daily as needed ('Pain/Headache'). !! insulin regular (NOVOLIN R) 100 unit/mL injection 2-10 Units by SubCUTAneous route See Admin Instructions.  Take 3-4 times daily per sliding scale; Patient is not sure of exact scale.'      insulin glargine (LANTUS) 100 unit/mL injection 46 Units by SubCUTAneous route daily. !! - Potential duplicate medications found. Please discuss with provider. My Recommended Diet, Activity, Wound Care, and follow-up labs are listed in the patient's Discharge Insturctions which I have personally completed and reviewed.     ______________________________________________________________________    Risk of deterioration: Moderate    Condition at Discharge:  Stable  ______________________________________________________________________    Disposition  Home with family, no needs  ______________________________________________________________________    Care Plan discussed with:   Patient, RN, Care Manager, Consultant    ______________________________________________________________________    Code Status: Full Code  ______________________________________________________________________      Follow up with:   PCP : None  Follow-up Information     Follow up With Specialties Details Why Contact Radames Pimentel MD Internal Medicine Go on 11/28/2018 New Patient Hospital follow-up scheduled at 10:00am Please bring Insurance Card, Discharge paperwork, Photo ID, list of medications and any co-pay you may have ( If you have questions or need to reschedule please call [address]  [address] [facility]  [phone]      None    None ([phone]) Patient stated that they have no PCP      Kody Dillon MD Gastroenterology In 2 weeks  [address] [facility]      Liss Vasquez MD Urology In 2 weeks  [address]  [phone]                Total time in minutes spent coordinating this discharge (includes going over instructions, follow-up, prescriptions, and preparing report for sign off to her PCP) :  35 minutes    Signed:  Anya RINCON Chintan Rios MD

## 2018-11-20 NOTE — H&P
Pre-endoscopy H and P    The patient was seen and examined in the endosocpy suite. The airway was assessed and docuemented. The problem list, past medical history, and medications were reviewed.    We are doing this for hematemesis, nausea, vomiting    Patient Active Problem List   Diagnosis Code    HTN (hypertension) I10    Tobacco abuse Z72.0    Tachycardia R00.0    Diabetic neuropathy, type I diabetes mellitus ([facility] E10.40    DKA, type 1 ([facility] E10.10    Neuropathy G62.9    Upper GI bleed K92.2     Social History     Socioeconomic History    Marital status: SINGLE     Spouse name: Not on file    Number of children: Not on file    Years of education: Not on file    Highest education level: Not on file   Social Needs    Financial resource strain: Not on file    Food insecurity - worry: Not on file    Food insecurity - inability: Not on file   [employer] needs - medical: Not on file   [employer] needs - non-medical: Not on file   Occupational History    Not on file   Tobacco Use    Smoking status: Current Some Day Smoker     Packs/day: 0.10     Years: 16.00     Pack years: 1.60     Types: Cigarettes    Smokeless tobacco: Never Used   Substance and Sexual Activity    Alcohol use: No    Drug use: No    Sexual activity: Not on file   Other Topics Concern    Not on file   Social History Narrative    ** Merged History Encounter **          Past Medical History:   Diagnosis Date    [facility]     [facility]     Depression     Diabetes [facility]     DKA, type 1 ([facility] 1/27/2013    diagnosed age 23    H/O noncompliance with medical treatment, presenting hazards to health     MRSA (methicillin resistant staph aureus) culture positive     MRSA (methicillin resistant Staphylococcus aureus)     Face    Noncompliance with medication regimen     Second hand smoke exposure     Seizure ([facility]     Seizures ([address] 2006 or 2007    one episode during [facility] The patient has a family history of na    Prior to Admission Medications   Prescriptions Last Dose Informant Patient Reported? Taking? OTHER   No No   Sig: Glucometer from [facility]  And Glucostrips      These are over the counter and very affordable   acetaminophen (TYLENOL EXTRA STRENGTH) 500 mg tablet  Self Yes No   Sig: Take 1,000 mg by mouth two (2) times daily as needed ('Pain/Headache'). gabapentin (NEURONTIN) 100 mg capsule   No No   Sig: Take 2 Caps by mouth nightly. insulin glargine (LANTUS) 100 unit/mL injection  Self Yes No   Si Units by SubCUTAneous route daily. insulin glargine (LANTUS) 100 unit/mL injection   No No   Si units daily   insulin regular (NOVOLIN R) 100 unit/mL injection  Self Yes No   Si-10 Units by SubCUTAneous route See Admin Instructions. Take 3-4 times daily per sliding scale; Patient is not sure of exact scale.'   insulin regular (NOVOLIN R, HUMULIN R) 100 unit/mL injection   No No   Si units 3 times a day before meals. pantoprazole (PROTONIX) 40 mg tablet   No No   Sig: Take 1 Tab by mouth Daily (before breakfast). Facility-Administered Medications: None           The review of systems is:  negative for shortness of breath or chest pain  Lab Results   Component Value Date/Time    WBC 8.3 2018 03:44 AM    Hemoglobin (POC) 18.4 (H) 2017 09:00 AM    HGB 15.1 2018 03:44 AM    Hematocrit (POC) 54 (H) 2017 09:00 AM    HCT 42.5 2018 03:44 AM    PLATELET 272  03:44 AM    MCV 90.6 2018 03:44 AM         The heart, lungs, and mental status were satisfactory for the administration of anesthesia sedation and for the procedure. I discussed with the patient the objectives, risks, consequences and alternatives to the procedure.       Jose Álvarez MD  2018  10:44 AM

## 2018-11-20 NOTE — ROUTINE PROCESS
Patient transported to floor in stable condition on stretcher by transport staff.  Patients tongue ring in container in his hand

## 2019-01-08 NOTE — PROGRESS NOTES
TRANSFER - IN REPORT:    Verbal report received from [facility] (name) on Sulma Katz  being received from CCU (unit) for routine progression of care      Report consisted of patients Situation, Background, Assessment and   Recommendations(SBAR). Information from the following report(s) SBAR, Kardex, ED Summary, Intake/Output, MAR, Recent Results and Med Rec Status was reviewed with the receiving nurse. Opportunity for questions and clarification was provided. Assessment completed upon patients arrival to unit and care assumed. Cardiac

## 2019-04-14 ENCOUNTER — HOSPITAL ENCOUNTER (INPATIENT)
Age: 39
LOS: 3 days | Discharge: HOME | DRG: 638 | End: 2019-04-17
Attending: EMERGENCY MEDICINE | Admitting: INTERNAL MEDICINE
Payer: SELF-PAY

## 2019-04-14 DIAGNOSIS — E10.10 DIABETIC KETOACIDOSIS WITHOUT COMA ASSOCIATED WITH TYPE 1 DIABETES MELLITUS (HCC): Primary | ICD-10-CM

## 2019-04-14 LAB
ADMINISTERED INITIALS, ADMINIT: NORMAL
ALBUMIN SERPL-MCNC: 4.3 G/DL (ref 3.5–5)
ALBUMIN/GLOB SERPL: 1 {RATIO} (ref 1.1–2.2)
ALP SERPL-CCNC: 214 U/L (ref 45–117)
ALT SERPL-CCNC: 81 U/L (ref 12–78)
ANION GAP SERPL CALC-SCNC: 15 MMOL/L (ref 5–15)
ANION GAP SERPL CALC-SCNC: 27 MMOL/L (ref 5–15)
ANION GAP SERPL CALC-SCNC: 35 MMOL/L (ref 5–15)
AST SERPL-CCNC: 16 U/L (ref 15–37)
BASOPHILS # BLD: 0 K/UL (ref 0–0.1)
BASOPHILS NFR BLD: 0 % (ref 0–1)
BILIRUB SERPL-MCNC: 0.5 MG/DL (ref 0.2–1)
BUN SERPL-MCNC: 38 MG/DL (ref 6–20)
BUN SERPL-MCNC: 40 MG/DL (ref 6–20)
BUN SERPL-MCNC: 40 MG/DL (ref 6–20)
BUN/CREAT SERPL: 18 (ref 12–20)
BUN/CREAT SERPL: 21 (ref 12–20)
BUN/CREAT SERPL: 24 (ref 12–20)
CALCIUM SERPL-MCNC: 8.3 MG/DL (ref 8.5–10.1)
CALCIUM SERPL-MCNC: 8.8 MG/DL (ref 8.5–10.1)
CALCIUM SERPL-MCNC: 9.4 MG/DL (ref 8.5–10.1)
CHLORIDE SERPL-SCNC: 101 MMOL/L (ref 97–108)
CHLORIDE SERPL-SCNC: 104 MMOL/L (ref 97–108)
CHLORIDE SERPL-SCNC: 90 MMOL/L (ref 97–108)
CO2 SERPL-SCNC: 20 MMOL/L (ref 21–32)
CO2 SERPL-SCNC: 7 MMOL/L (ref 21–32)
CO2 SERPL-SCNC: 8 MMOL/L (ref 21–32)
COMMENT, HOLDF: NORMAL
CREAT SERPL-MCNC: 1.66 MG/DL (ref 0.7–1.3)
CREAT SERPL-MCNC: 1.92 MG/DL (ref 0.7–1.3)
CREAT SERPL-MCNC: 2.09 MG/DL (ref 0.7–1.3)
D50 ADMINISTERED, D50ADM: 0 ML
D50 ADMINISTERED, D50ADM: NORMAL ML
D50 ORDER, D50ORD: 0 ML
D50 ORDER, D50ORD: NORMAL ML
DIFFERENTIAL METHOD BLD: ABNORMAL
EOSINOPHIL # BLD: 0 K/UL (ref 0–0.4)
EOSINOPHIL NFR BLD: 0 % (ref 0–7)
ERYTHROCYTE [DISTWIDTH] IN BLOOD BY AUTOMATED COUNT: 13.2 % (ref 11.5–14.5)
GLOBULIN SER CALC-MCNC: 4.3 G/DL (ref 2–4)
GLSCOM COMMENTS: NORMAL
GLUCOSE BLD STRIP.AUTO-MCNC: 214 MG/DL (ref 65–100)
GLUCOSE BLD STRIP.AUTO-MCNC: 271 MG/DL (ref 65–100)
GLUCOSE BLD STRIP.AUTO-MCNC: 276 MG/DL (ref 65–100)
GLUCOSE BLD STRIP.AUTO-MCNC: 327 MG/DL (ref 65–100)
GLUCOSE BLD STRIP.AUTO-MCNC: 360 MG/DL (ref 65–100)
GLUCOSE BLD STRIP.AUTO-MCNC: 416 MG/DL (ref 65–100)
GLUCOSE BLD STRIP.AUTO-MCNC: 574 MG/DL (ref 65–100)
GLUCOSE BLD STRIP.AUTO-MCNC: 587 MG/DL (ref 65–100)
GLUCOSE BLD STRIP.AUTO-MCNC: >600 MG/DL (ref 65–100)
GLUCOSE BLD STRIP.AUTO-MCNC: >600 MG/DL (ref 65–100)
GLUCOSE SERPL-MCNC: 286 MG/DL (ref 65–100)
GLUCOSE SERPL-MCNC: 479 MG/DL (ref 65–100)
GLUCOSE SERPL-MCNC: 629 MG/DL (ref 65–100)
GLUCOSE, GLC: 214 MG/DL
GLUCOSE, GLC: 271 MG/DL
GLUCOSE, GLC: 276 MG/DL
GLUCOSE, GLC: 327 MG/DL
GLUCOSE, GLC: 360 MG/DL
GLUCOSE, GLC: 416 MG/DL
GLUCOSE, GLC: 587 MG/DL
GLUCOSE, GLC: 601 MG/DL
GLUCOSE, GLC: NORMAL MG/DL
HCT VFR BLD AUTO: 51.2 % (ref 36.6–50.3)
HGB BLD-MCNC: 17.9 G/DL (ref 12.1–17)
HIGH TARGET, HITG: 250 MG/DL
HIGH TARGET, HITG: NORMAL MG/DL
IMM GRANULOCYTES # BLD AUTO: 0.1 K/UL (ref 0–0.04)
IMM GRANULOCYTES NFR BLD AUTO: 1 % (ref 0–0.5)
INSULIN ADMINSTERED, INSADM: 10.7 UNITS/HOUR
INSULIN ADMINSTERED, INSADM: 10.8 UNITS/HOUR
INSULIN ADMINSTERED, INSADM: 10.8 UNITS/HOUR
INSULIN ADMINSTERED, INSADM: 12 UNITS/HOUR
INSULIN ADMINSTERED, INSADM: 13 UNITS/HOUR
INSULIN ADMINSTERED, INSADM: 13.4 UNITS/HOUR
INSULIN ADMINSTERED, INSADM: 14.8 UNITS/HOUR
INSULIN ADMINSTERED, INSADM: 15.8 UNITS/HOUR
INSULIN ADMINSTERED, INSADM: NORMAL UNITS/HOUR
INSULIN ORDER, INSORD: 10.7 UNITS/HOUR
INSULIN ORDER, INSORD: 10.8 UNITS/HOUR
INSULIN ORDER, INSORD: 10.8 UNITS/HOUR
INSULIN ORDER, INSORD: 12 UNITS/HOUR
INSULIN ORDER, INSORD: 13 UNITS/HOUR
INSULIN ORDER, INSORD: 13.4 UNITS/HOUR
INSULIN ORDER, INSORD: 14.8 UNITS/HOUR
INSULIN ORDER, INSORD: 15.8 UNITS/HOUR
INSULIN ORDER, INSORD: NORMAL UNITS/HOUR
LACTATE SERPL-SCNC: 2.3 MMOL/L (ref 0.4–2)
LOW TARGET, LOT: 150 MG/DL
LOW TARGET, LOT: NORMAL MG/DL
LYMPHOCYTES # BLD: 1.6 K/UL (ref 0.8–3.5)
LYMPHOCYTES NFR BLD: 13 % (ref 12–49)
MAGNESIUM SERPL-MCNC: 2.2 MG/DL (ref 1.6–2.4)
MAGNESIUM SERPL-MCNC: 2.5 MG/DL (ref 1.6–2.4)
MAGNESIUM SERPL-MCNC: 2.6 MG/DL (ref 1.6–2.4)
MCH RBC QN AUTO: 32.5 PG (ref 26–34)
MCHC RBC AUTO-ENTMCNC: 35 G/DL (ref 30–36.5)
MCV RBC AUTO: 93.1 FL (ref 80–99)
MINUTES UNTIL NEXT BG, NBG: 60 MIN
MINUTES UNTIL NEXT BG, NBG: NORMAL MIN
MONOCYTES # BLD: 0.6 K/UL (ref 0–1)
MONOCYTES NFR BLD: 4 % (ref 5–13)
MULTIPLIER, MUL: 0.02
MULTIPLIER, MUL: 0.03
MULTIPLIER, MUL: 0.03
MULTIPLIER, MUL: 0.04
MULTIPLIER, MUL: 0.05
MULTIPLIER, MUL: 0.06
MULTIPLIER, MUL: 0.07
MULTIPLIER, MUL: 0.07
MULTIPLIER, MUL: NORMAL
NEUTS SEG # BLD: 10.2 K/UL (ref 1.8–8)
NEUTS SEG NFR BLD: 82 % (ref 32–75)
NRBC # BLD: 0 K/UL (ref 0–0.01)
NRBC BLD-RTO: 0 PER 100 WBC
ORDER INITIALS, ORDINIT: NORMAL
PHOSPHATE SERPL-MCNC: 4.1 MG/DL (ref 2.6–4.7)
PLATELET # BLD AUTO: 344 K/UL (ref 150–400)
PMV BLD AUTO: 10.9 FL (ref 8.9–12.9)
POTASSIUM SERPL-SCNC: 3.1 MMOL/L (ref 3.5–5.1)
POTASSIUM SERPL-SCNC: 4.1 MMOL/L (ref 3.5–5.1)
POTASSIUM SERPL-SCNC: 4.5 MMOL/L (ref 3.5–5.1)
PROT SERPL-MCNC: 8.6 G/DL (ref 6.4–8.2)
RBC # BLD AUTO: 5.5 M/UL (ref 4.1–5.7)
SAMPLES BEING HELD,HOLD: NORMAL
SERVICE CMNT-IMP: ABNORMAL
SODIUM SERPL-SCNC: 132 MMOL/L (ref 136–145)
SODIUM SERPL-SCNC: 136 MMOL/L (ref 136–145)
SODIUM SERPL-SCNC: 139 MMOL/L (ref 136–145)
TROPONIN I SERPL-MCNC: <0.05 NG/ML
WBC # BLD AUTO: 12.5 K/UL (ref 4.1–11.1)

## 2019-04-14 PROCEDURE — 83605 ASSAY OF LACTIC ACID: CPT

## 2019-04-14 PROCEDURE — 83735 ASSAY OF MAGNESIUM: CPT

## 2019-04-14 PROCEDURE — 74011636637 HC RX REV CODE- 636/637: Performed by: EMERGENCY MEDICINE

## 2019-04-14 PROCEDURE — 65660000000 HC RM CCU STEPDOWN

## 2019-04-14 PROCEDURE — 82962 GLUCOSE BLOOD TEST: CPT

## 2019-04-14 PROCEDURE — 80048 BASIC METABOLIC PNL TOTAL CA: CPT

## 2019-04-14 PROCEDURE — 83036 HEMOGLOBIN GLYCOSYLATED A1C: CPT

## 2019-04-14 PROCEDURE — 84484 ASSAY OF TROPONIN QUANT: CPT

## 2019-04-14 PROCEDURE — 74011250637 HC RX REV CODE- 250/637: Performed by: INTERNAL MEDICINE

## 2019-04-14 PROCEDURE — C9113 INJ PANTOPRAZOLE SODIUM, VIA: HCPCS | Performed by: INTERNAL MEDICINE

## 2019-04-14 PROCEDURE — 36415 COLL VENOUS BLD VENIPUNCTURE: CPT

## 2019-04-14 PROCEDURE — 74011000258 HC RX REV CODE- 258: Performed by: EMERGENCY MEDICINE

## 2019-04-14 PROCEDURE — 84100 ASSAY OF PHOSPHORUS: CPT

## 2019-04-14 PROCEDURE — 85025 COMPLETE CBC W/AUTO DIFF WBC: CPT

## 2019-04-14 PROCEDURE — 99285 EMERGENCY DEPT VISIT HI MDM: CPT

## 2019-04-14 PROCEDURE — 96374 THER/PROPH/DIAG INJ IV PUSH: CPT

## 2019-04-14 PROCEDURE — 80053 COMPREHEN METABOLIC PANEL: CPT

## 2019-04-14 PROCEDURE — 93005 ELECTROCARDIOGRAM TRACING: CPT

## 2019-04-14 PROCEDURE — 74011250636 HC RX REV CODE- 250/636: Performed by: EMERGENCY MEDICINE

## 2019-04-14 PROCEDURE — 74011250636 HC RX REV CODE- 250/636: Performed by: INTERNAL MEDICINE

## 2019-04-14 PROCEDURE — 96361 HYDRATE IV INFUSION ADD-ON: CPT

## 2019-04-14 RX ORDER — METOCLOPRAMIDE HYDROCHLORIDE 5 MG/ML
10 INJECTION INTRAMUSCULAR; INTRAVENOUS
Status: COMPLETED | OUTPATIENT
Start: 2019-04-14 | End: 2019-04-14

## 2019-04-14 RX ORDER — ACETAMINOPHEN 500 MG
1000 TABLET ORAL
Status: DISCONTINUED | OUTPATIENT
Start: 2019-04-14 | End: 2019-04-17 | Stop reason: HOSPADM

## 2019-04-14 RX ORDER — MAGNESIUM SULFATE 100 %
4 CRYSTALS MISCELLANEOUS AS NEEDED
Status: DISCONTINUED | OUTPATIENT
Start: 2019-04-14 | End: 2019-04-15

## 2019-04-14 RX ORDER — DEXTROSE, SODIUM CHLORIDE, AND POTASSIUM CHLORIDE 5; .45; .3 G/100ML; G/100ML; G/100ML
INJECTION INTRAVENOUS CONTINUOUS
Status: DISCONTINUED | OUTPATIENT
Start: 2019-04-15 | End: 2019-04-15

## 2019-04-14 RX ORDER — INSULIN LISPRO 100 [IU]/ML
INJECTION, SOLUTION INTRAVENOUS; SUBCUTANEOUS
Status: DISCONTINUED | OUTPATIENT
Start: 2019-04-14 | End: 2019-04-14

## 2019-04-14 RX ORDER — DEXTROSE 50 % IN WATER (D50W) INTRAVENOUS SYRINGE
25-50 AS NEEDED
Status: DISCONTINUED | OUTPATIENT
Start: 2019-04-14 | End: 2019-04-15

## 2019-04-14 RX ORDER — SODIUM CHLORIDE 9 MG/ML
150 INJECTION, SOLUTION INTRAVENOUS CONTINUOUS
Status: DISCONTINUED | OUTPATIENT
Start: 2019-04-14 | End: 2019-04-14

## 2019-04-14 RX ORDER — INSULIN GLARGINE 100 [IU]/ML
46 INJECTION, SOLUTION SUBCUTANEOUS DAILY
COMMUNITY
End: 2019-07-07

## 2019-04-14 RX ORDER — OXYCODONE AND ACETAMINOPHEN 5; 325 MG/1; MG/1
1 TABLET ORAL
Status: DISCONTINUED | OUTPATIENT
Start: 2019-04-14 | End: 2019-04-17 | Stop reason: HOSPADM

## 2019-04-14 RX ORDER — ONDANSETRON 2 MG/ML
4 INJECTION INTRAMUSCULAR; INTRAVENOUS
Status: DISCONTINUED | OUTPATIENT
Start: 2019-04-14 | End: 2019-04-17 | Stop reason: HOSPADM

## 2019-04-14 RX ADMIN — SODIUM CHLORIDE 40 MG: 9 INJECTION, SOLUTION INTRAMUSCULAR; INTRAVENOUS; SUBCUTANEOUS at 21:05

## 2019-04-14 RX ADMIN — SODIUM CHLORIDE 150 ML/HR: 900 INJECTION, SOLUTION INTRAVENOUS at 17:16

## 2019-04-14 RX ADMIN — METOCLOPRAMIDE 10 MG: 5 INJECTION, SOLUTION INTRAMUSCULAR; INTRAVENOUS at 13:31

## 2019-04-14 RX ADMIN — OXYCODONE AND ACETAMINOPHEN 1 TABLET: 5; 325 TABLET ORAL at 21:27

## 2019-04-14 RX ADMIN — ONDANSETRON 4 MG: 2 INJECTION INTRAMUSCULAR; INTRAVENOUS at 20:01

## 2019-04-14 RX ADMIN — SODIUM CHLORIDE 150 ML/HR: 900 INJECTION, SOLUTION INTRAVENOUS at 23:14

## 2019-04-14 RX ADMIN — SODIUM CHLORIDE 1000 ML: 900 INJECTION, SOLUTION INTRAVENOUS at 13:31

## 2019-04-14 RX ADMIN — SODIUM CHLORIDE 10.8 UNITS/HR: 900 INJECTION, SOLUTION INTRAVENOUS at 23:33

## 2019-04-14 RX ADMIN — SODIUM CHLORIDE 10.8 UNITS/HR: 900 INJECTION, SOLUTION INTRAVENOUS at 15:45

## 2019-04-14 RX ADMIN — SODIUM CHLORIDE 1000 ML: 900 INJECTION, SOLUTION INTRAVENOUS at 14:42

## 2019-04-14 RX ADMIN — ONDANSETRON 4 MG: 2 INJECTION INTRAMUSCULAR; INTRAVENOUS at 17:49

## 2019-04-14 NOTE — ED NOTES
Pt arrived via EMS and bedside report obtained. Assumed care of pt at this time. EMS reports that pt was found in his bed with c/o weakness. EMS states that, per patient, he has not been out of his bed x Friday due to weakness. Pt is a Type I diabetic, who has not taken his insulin x Friday either. EMS reports BG reading in the 560's at patient's home and HI en route. Pt with  upon arrival to ED. Pt resting comfortably in bed with side rails up and call bell with within reach. Pt aware of plan to await for MD/PA-C assessment, and pt/family verbalizes understanding. Placed on Monitor x 3 with alarms on.

## 2019-04-14 NOTE — PROGRESS NOTES
TRANSFER - IN REPORT:    Verbal report received from Gisela Merino RN (name) on Joyce First  being received from ED (unit) for routine progression of care      Report consisted of patients Situation, Background, Assessment and   Recommendations(SBAR). Information from the following report(s) SBAR, Kardex, ED Summary, Procedure Summary, Intake/Output, MAR, Recent Results, Med Rec Status, Cardiac Rhythm ST, Alarm Parameters  and Quality Measures was reviewed with the receiving nurse. Opportunity for questions and clarification was provided. Assessment completed upon patients arrival to unit and care assumed. 1643--Primary Nurse Tom Candelario RN and Neo Davison RN performed a dual skin assessment on this patient No impairment noted  Corey score is 19    1643--Patient standing to urinate. Urinated 900 cc of urine into urinal.  1644--Emptied urinal into toilet and rinsed out and put back so patient could reach it when needed again. 1645--Patient with nausea and vomiting throwing up into emesis bag. Emesis is coffee ground in color with fowl smell. Patient asking for another bag.    1700--Patient standing to urinate in urinal again, but not able to go at this time. 1701--Titrated insulin gtt to 15.8 Units/hr from 10.8 Units/hr. 1706--Called Dr. Susana Lara at [phone] and asked about IV fluids for patient. Dr. Franco Public ordered NS at 150 cc/hr. 1716--Started NS at 150 cc/hr after scanning into [facility]. 1710--Patient standing again to urinate in urinal, but not able to go again. 1749--Gave patient Zofran 4 mg for nausea and vomiting. Will monitor. 1807--Collected lab for BMP from patient as ordered. 1808--Titrated insulin gtt from 15.8 Units/hr to 10.7 Units/hr. 1830--Patient rang call bell and is still having nausea and vomiting. Will keep assessing patient and monitoring.

## 2019-04-14 NOTE — PROGRESS NOTES
Pharmacy Clarification of the Prior to Admission Medication Regimen Retrospective to the Admission Medication Reconciliation    The patient was interviewed regarding clarification of the prior to admission medication regimen and was questioned regarding use of any other inhalers, topical products, over the counter medications, herbal medications, vitamin products or ophthalmic/nasal/otic medication use. Information Obtained From: Patient, RX Query    Recommendations/Findings: The following amendments were made to the patient's active medication list on file at [facility]:     1) Additions:   insulin glargine (LANTUS U-100 INSULIN) 100 unit/mL injection    2) Removals: NONE    3) Changes:  insulin regular (NOVOLIN R) 100 unit/mL injection (Old regimen: 2-10 units SC /New regimen: 2-10 units SC TIDAC)    4) Pertinent Pharmacy Findings:  insulin regular (NOVOLIN R) 100 unit/mL injection: Patient stated he has a sliding scale for this agent but was unable to tell [facility] his sliding scale     PTA medication list was corrected to the following:     Prior to Admission Medications   Prescriptions Last Dose Informant Patient Reported? Taking?   insulin glargine (LANTUS U-100 INSULIN) 100 unit/mL injection 2019 at Unknown time Self Yes Yes   Si Units by SubCUTAneous route daily. insulin regular (NOVOLIN R) 100 unit/mL injection 3/31/2019 at Unknown time Self Yes Yes   Si-10 Units by SubCUTAneous route Before breakfast, lunch, and dinner.       Facility-Administered Medications: None          Thank you,  Damian Currys, CP  Medication History Pharmacy Technician

## 2019-04-14 NOTE — ED NOTES
TRANSFER - OUT REPORT:    Verbal report given to Prudencio Rizzo RN(name) on Sid Holcomb  being transferred to PCU(unit) for routine progression of care       Report consisted of patients Situation, Background, Assessment and   Recommendations(SBAR). Information from the following report(s) SBAR, Kardex, ED Summary, MAR, Recent Results and Cardiac Rhythm sinus tach was reviewed with the receiving nurse. Lines:   Peripheral IV 04/14/19 Left Antecubital (Active)   Site Assessment Clean, dry, & intact 4/14/2019  1:36 PM   Phlebitis Assessment 0 4/14/2019  1:36 PM   Infiltration Assessment 0 4/14/2019  1:36 PM   Dressing Status Clean, dry, & intact 4/14/2019  1:36 PM   Dressing Type Tape;Transparent 4/14/2019  1:36 PM       Peripheral IV 04/14/19 Right Hand (Active)   Site Assessment Clean, dry, & intact 4/14/2019  2:16 PM   Phlebitis Assessment 0 4/14/2019  2:16 PM   Dressing Status Clean, dry, & intact 4/14/2019  2:16 PM   Dressing Type Transparent 4/14/2019  2:16 PM   Hub Color/Line Status Pink 4/14/2019  2:16 PM        Opportunity for questions and clarification was provided.       Patient transported with:   Monitor

## 2019-04-15 ENCOUNTER — APPOINTMENT (OUTPATIENT)
Dept: GENERAL RADIOLOGY | Age: 39
DRG: 638 | End: 2019-04-15
Attending: INTERNAL MEDICINE
Payer: SELF-PAY

## 2019-04-15 LAB
ADMINISTERED INITIALS, ADMINIT: NORMAL
ANION GAP SERPL CALC-SCNC: 6 MMOL/L (ref 5–15)
ANION GAP SERPL CALC-SCNC: 8 MMOL/L (ref 5–15)
ANION GAP SERPL CALC-SCNC: 9 MMOL/L (ref 5–15)
ATRIAL RATE: 107 BPM
BUN SERPL-MCNC: 24 MG/DL (ref 6–20)
BUN SERPL-MCNC: 33 MG/DL (ref 6–20)
BUN SERPL-MCNC: 38 MG/DL (ref 6–20)
BUN/CREAT SERPL: 24 (ref 12–20)
BUN/CREAT SERPL: 27 (ref 12–20)
BUN/CREAT SERPL: 29 (ref 12–20)
CALCIUM SERPL-MCNC: 7.7 MG/DL (ref 8.5–10.1)
CALCIUM SERPL-MCNC: 8.5 MG/DL (ref 8.5–10.1)
CALCIUM SERPL-MCNC: 8.5 MG/DL (ref 8.5–10.1)
CALCULATED P AXIS, ECG09: 90 DEGREES
CALCULATED R AXIS, ECG10: 149 DEGREES
CALCULATED T AXIS, ECG11: 77 DEGREES
CHLORIDE SERPL-SCNC: 102 MMOL/L (ref 97–108)
CHLORIDE SERPL-SCNC: 105 MMOL/L (ref 97–108)
CHLORIDE SERPL-SCNC: 106 MMOL/L (ref 97–108)
CO2 SERPL-SCNC: 24 MMOL/L (ref 21–32)
CO2 SERPL-SCNC: 25 MMOL/L (ref 21–32)
CO2 SERPL-SCNC: 25 MMOL/L (ref 21–32)
CREAT SERPL-MCNC: 1 MG/DL (ref 0.7–1.3)
CREAT SERPL-MCNC: 1.22 MG/DL (ref 0.7–1.3)
CREAT SERPL-MCNC: 1.32 MG/DL (ref 0.7–1.3)
D50 ADMINISTERED, D50ADM: 0 ML
D50 ORDER, D50ORD: 0 ML
DIAGNOSIS, 93000: NORMAL
EST. AVERAGE GLUCOSE BLD GHB EST-MCNC: 329 MG/DL
EST. AVERAGE GLUCOSE BLD GHB EST-MCNC: 349 MG/DL
GLSCOM COMMENTS: NORMAL
GLUCOSE BLD STRIP.AUTO-MCNC: 103 MG/DL (ref 65–100)
GLUCOSE BLD STRIP.AUTO-MCNC: 106 MG/DL (ref 65–100)
GLUCOSE BLD STRIP.AUTO-MCNC: 113 MG/DL (ref 65–100)
GLUCOSE BLD STRIP.AUTO-MCNC: 118 MG/DL (ref 65–100)
GLUCOSE BLD STRIP.AUTO-MCNC: 119 MG/DL (ref 65–100)
GLUCOSE BLD STRIP.AUTO-MCNC: 131 MG/DL (ref 65–100)
GLUCOSE BLD STRIP.AUTO-MCNC: 139 MG/DL (ref 65–100)
GLUCOSE BLD STRIP.AUTO-MCNC: 141 MG/DL (ref 65–100)
GLUCOSE BLD STRIP.AUTO-MCNC: 143 MG/DL (ref 65–100)
GLUCOSE BLD STRIP.AUTO-MCNC: 145 MG/DL (ref 65–100)
GLUCOSE BLD STRIP.AUTO-MCNC: 147 MG/DL (ref 65–100)
GLUCOSE BLD STRIP.AUTO-MCNC: 149 MG/DL (ref 65–100)
GLUCOSE BLD STRIP.AUTO-MCNC: 158 MG/DL (ref 65–100)
GLUCOSE BLD STRIP.AUTO-MCNC: 164 MG/DL (ref 65–100)
GLUCOSE BLD STRIP.AUTO-MCNC: 168 MG/DL (ref 65–100)
GLUCOSE BLD STRIP.AUTO-MCNC: 176 MG/DL (ref 65–100)
GLUCOSE BLD STRIP.AUTO-MCNC: 177 MG/DL (ref 65–100)
GLUCOSE BLD STRIP.AUTO-MCNC: 178 MG/DL (ref 65–100)
GLUCOSE BLD STRIP.AUTO-MCNC: 210 MG/DL (ref 65–100)
GLUCOSE BLD STRIP.AUTO-MCNC: 226 MG/DL (ref 65–100)
GLUCOSE BLD STRIP.AUTO-MCNC: 232 MG/DL (ref 65–100)
GLUCOSE BLD STRIP.AUTO-MCNC: 264 MG/DL (ref 65–100)
GLUCOSE BLD STRIP.AUTO-MCNC: 289 MG/DL (ref 65–100)
GLUCOSE SERPL-MCNC: 109 MG/DL (ref 65–100)
GLUCOSE SERPL-MCNC: 193 MG/DL (ref 65–100)
GLUCOSE SERPL-MCNC: 209 MG/DL (ref 65–100)
GLUCOSE, GLC: 103 MG/DL
GLUCOSE, GLC: 106 MG/DL
GLUCOSE, GLC: 115 MG/DL
GLUCOSE, GLC: 118 MG/DL
GLUCOSE, GLC: 119 MG/DL
GLUCOSE, GLC: 131 MG/DL
GLUCOSE, GLC: 139 MG/DL
GLUCOSE, GLC: 141 MG/DL
GLUCOSE, GLC: 143 MG/DL
GLUCOSE, GLC: 145 MG/DL
GLUCOSE, GLC: 147 MG/DL
GLUCOSE, GLC: 149 MG/DL
GLUCOSE, GLC: 158 MG/DL
GLUCOSE, GLC: 164 MG/DL
GLUCOSE, GLC: 168 MG/DL
GLUCOSE, GLC: 176 MG/DL
GLUCOSE, GLC: 177 MG/DL
GLUCOSE, GLC: 178 MG/DL
GLUCOSE, GLC: 210 MG/DL
GLUCOSE, GLC: 226 MG/DL
GLUCOSE, GLC: 232 MG/DL
GLUCOSE, GLC: 264 MG/DL
GLUCOSE, GLC: 289 MG/DL
HBA1C MFR BLD: 13.1 % (ref 4.2–6.3)
HBA1C MFR BLD: 13.8 % (ref 4.2–6.3)
HIGH TARGET, HITG: 250 MG/DL
INSULIN ADMINSTERED, INSADM: 0 UNITS/HOUR
INSULIN ADMINSTERED, INSADM: 0.1 UNITS/HOUR
INSULIN ADMINSTERED, INSADM: 0.3 UNITS/HOUR
INSULIN ADMINSTERED, INSADM: 0.6 UNITS/HOUR
INSULIN ADMINSTERED, INSADM: 0.7 UNITS/HOUR
INSULIN ADMINSTERED, INSADM: 1.4 UNITS/HOUR
INSULIN ADMINSTERED, INSADM: 1.6 UNITS/HOUR
INSULIN ADMINSTERED, INSADM: 2.1 UNITS/HOUR
INSULIN ADMINSTERED, INSADM: 2.2 UNITS/HOUR
INSULIN ADMINSTERED, INSADM: 2.3 UNITS/HOUR
INSULIN ADMINSTERED, INSADM: 2.6 UNITS/HOUR
INSULIN ADMINSTERED, INSADM: 3 UNITS/HOUR
INSULIN ADMINSTERED, INSADM: 3.4 UNITS/HOUR
INSULIN ADMINSTERED, INSADM: 4.1 UNITS/HOUR
INSULIN ADMINSTERED, INSADM: 4.8 UNITS/HOUR
INSULIN ADMINSTERED, INSADM: 8.2 UNITS/HOUR
INSULIN ORDER, INSORD: 0 UNITS/HOUR
INSULIN ORDER, INSORD: 0.1 UNITS/HOUR
INSULIN ORDER, INSORD: 0.3 UNITS/HOUR
INSULIN ORDER, INSORD: 0.6 UNITS/HOUR
INSULIN ORDER, INSORD: 0.7 UNITS/HOUR
INSULIN ORDER, INSORD: 1.4 UNITS/HOUR
INSULIN ORDER, INSORD: 1.6 UNITS/HOUR
INSULIN ORDER, INSORD: 2.1 UNITS/HOUR
INSULIN ORDER, INSORD: 2.2 UNITS/HOUR
INSULIN ORDER, INSORD: 2.3 UNITS/HOUR
INSULIN ORDER, INSORD: 2.6 UNITS/HOUR
INSULIN ORDER, INSORD: 3 UNITS/HOUR
INSULIN ORDER, INSORD: 3.4 UNITS/HOUR
INSULIN ORDER, INSORD: 4.1 UNITS/HOUR
INSULIN ORDER, INSORD: 4.8 UNITS/HOUR
INSULIN ORDER, INSORD: 8.2 UNITS/HOUR
LIPASE SERPL-CCNC: 248 U/L (ref 73–393)
LOW TARGET, LOT: 150 MG/DL
MAGNESIUM SERPL-MCNC: 1.9 MG/DL (ref 1.6–2.4)
MAGNESIUM SERPL-MCNC: 2 MG/DL (ref 1.6–2.4)
MAGNESIUM SERPL-MCNC: 2.1 MG/DL (ref 1.6–2.4)
MINUTES UNTIL NEXT BG, NBG: 60 MIN
MULTIPLIER, MUL: 0
MULTIPLIER, MUL: 0.01
MULTIPLIER, MUL: 0.02
MULTIPLIER, MUL: 0.03
MULTIPLIER, MUL: 0.04
MULTIPLIER, MUL: 0.04
MULTIPLIER, MUL: 0.06
MULTIPLIER, MUL: 0.07
ORDER INITIALS, ORDINIT: NORMAL
P-R INTERVAL, ECG05: 138 MS
POTASSIUM SERPL-SCNC: 3.2 MMOL/L (ref 3.5–5.1)
POTASSIUM SERPL-SCNC: 3.6 MMOL/L (ref 3.5–5.1)
POTASSIUM SERPL-SCNC: 3.8 MMOL/L (ref 3.5–5.1)
Q-T INTERVAL, ECG07: 370 MS
QRS DURATION, ECG06: 96 MS
QTC CALCULATION (BEZET), ECG08: 493 MS
SERVICE CMNT-IMP: ABNORMAL
SODIUM SERPL-SCNC: 135 MMOL/L (ref 136–145)
SODIUM SERPL-SCNC: 137 MMOL/L (ref 136–145)
SODIUM SERPL-SCNC: 138 MMOL/L (ref 136–145)
VENTRICULAR RATE, ECG03: 107 BPM

## 2019-04-15 PROCEDURE — 80048 BASIC METABOLIC PNL TOTAL CA: CPT

## 2019-04-15 PROCEDURE — 74011636637 HC RX REV CODE- 636/637: Performed by: INTERNAL MEDICINE

## 2019-04-15 PROCEDURE — 65660000000 HC RM CCU STEPDOWN

## 2019-04-15 PROCEDURE — 82962 GLUCOSE BLOOD TEST: CPT

## 2019-04-15 PROCEDURE — 74011250637 HC RX REV CODE- 250/637: Performed by: INTERNAL MEDICINE

## 2019-04-15 PROCEDURE — 83735 ASSAY OF MAGNESIUM: CPT

## 2019-04-15 PROCEDURE — 83690 ASSAY OF LIPASE: CPT

## 2019-04-15 PROCEDURE — 74019 RADEX ABDOMEN 2 VIEWS: CPT

## 2019-04-15 PROCEDURE — 74011000258 HC RX REV CODE- 258: Performed by: INTERNAL MEDICINE

## 2019-04-15 PROCEDURE — 74011000250 HC RX REV CODE- 250: Performed by: INTERNAL MEDICINE

## 2019-04-15 PROCEDURE — 74011250636 HC RX REV CODE- 250/636: Performed by: INTERNAL MEDICINE

## 2019-04-15 PROCEDURE — C9113 INJ PANTOPRAZOLE SODIUM, VIA: HCPCS | Performed by: INTERNAL MEDICINE

## 2019-04-15 PROCEDURE — 36415 COLL VENOUS BLD VENIPUNCTURE: CPT

## 2019-04-15 PROCEDURE — 83036 HEMOGLOBIN GLYCOSYLATED A1C: CPT

## 2019-04-15 RX ORDER — INSULIN LISPRO 100 [IU]/ML
INJECTION, SOLUTION INTRAVENOUS; SUBCUTANEOUS
Status: DISCONTINUED | OUTPATIENT
Start: 2019-04-15 | End: 2019-04-15

## 2019-04-15 RX ORDER — SODIUM CHLORIDE 9 MG/ML
100 INJECTION, SOLUTION INTRAVENOUS CONTINUOUS
Status: DISCONTINUED | OUTPATIENT
Start: 2019-04-15 | End: 2019-04-15

## 2019-04-15 RX ORDER — MAGNESIUM SULFATE 100 %
4 CRYSTALS MISCELLANEOUS AS NEEDED
Status: DISCONTINUED | OUTPATIENT
Start: 2019-04-15 | End: 2019-04-15

## 2019-04-15 RX ORDER — DEXTROSE MONOHYDRATE AND SODIUM CHLORIDE 5; .9 G/100ML; G/100ML
100 INJECTION, SOLUTION INTRAVENOUS CONTINUOUS
Status: DISCONTINUED | OUTPATIENT
Start: 2019-04-15 | End: 2019-04-16

## 2019-04-15 RX ORDER — INSULIN GLARGINE 100 [IU]/ML
30 INJECTION, SOLUTION SUBCUTANEOUS DAILY
Status: DISCONTINUED | OUTPATIENT
Start: 2019-04-15 | End: 2019-04-15

## 2019-04-15 RX ORDER — DEXTROSE 50 % IN WATER (D50W) INTRAVENOUS SYRINGE
25-50 AS NEEDED
Status: DISCONTINUED | OUTPATIENT
Start: 2019-04-15 | End: 2019-04-16

## 2019-04-15 RX ORDER — INSULIN LISPRO 100 [IU]/ML
INJECTION, SOLUTION INTRAVENOUS; SUBCUTANEOUS
Status: DISCONTINUED | OUTPATIENT
Start: 2019-04-15 | End: 2019-04-16

## 2019-04-15 RX ORDER — MAGNESIUM SULFATE 100 %
4 CRYSTALS MISCELLANEOUS AS NEEDED
Status: DISCONTINUED | OUTPATIENT
Start: 2019-04-15 | End: 2019-04-16

## 2019-04-15 RX ORDER — DEXTROSE 50 % IN WATER (D50W) INTRAVENOUS SYRINGE
12.5-25 AS NEEDED
Status: DISCONTINUED | OUTPATIENT
Start: 2019-04-15 | End: 2019-04-15 | Stop reason: SDUPTHER

## 2019-04-15 RX ORDER — MAGNESIUM SULFATE 100 %
4 CRYSTALS MISCELLANEOUS AS NEEDED
Status: DISCONTINUED | OUTPATIENT
Start: 2019-04-15 | End: 2019-04-15 | Stop reason: SDUPTHER

## 2019-04-15 RX ORDER — INSULIN LISPRO 100 [IU]/ML
INJECTION, SOLUTION INTRAVENOUS; SUBCUTANEOUS
Status: DISCONTINUED | OUTPATIENT
Start: 2019-04-15 | End: 2019-04-15 | Stop reason: SDUPTHER

## 2019-04-15 RX ORDER — DEXTROSE 50 % IN WATER (D50W) INTRAVENOUS SYRINGE
25-50 AS NEEDED
Status: DISCONTINUED | OUTPATIENT
Start: 2019-04-15 | End: 2019-04-15

## 2019-04-15 RX ADMIN — SODIUM CHLORIDE 40 MG: 9 INJECTION, SOLUTION INTRAMUSCULAR; INTRAVENOUS; SUBCUTANEOUS at 09:45

## 2019-04-15 RX ADMIN — ONDANSETRON 4 MG: 2 INJECTION INTRAMUSCULAR; INTRAVENOUS at 22:55

## 2019-04-15 RX ADMIN — OXYCODONE AND ACETAMINOPHEN 1 TABLET: 5; 325 TABLET ORAL at 17:34

## 2019-04-15 RX ADMIN — DEXTROSE MONOHYDRATE, SODIUM CHLORIDE, AND POTASSIUM CHLORIDE: 50; 4.5; 2.98 INJECTION, SOLUTION INTRAVENOUS at 00:39

## 2019-04-15 RX ADMIN — DEXTROSE MONOHYDRATE AND SODIUM CHLORIDE 100 ML/HR: 5; .9 INJECTION, SOLUTION INTRAVENOUS at 14:57

## 2019-04-15 RX ADMIN — ONDANSETRON 4 MG: 2 INJECTION INTRAMUSCULAR; INTRAVENOUS at 02:49

## 2019-04-15 RX ADMIN — ONDANSETRON 4 MG: 2 INJECTION INTRAMUSCULAR; INTRAVENOUS at 07:12

## 2019-04-15 RX ADMIN — SODIUM CHLORIDE 0.1 UNITS/HR: 900 INJECTION, SOLUTION INTRAVENOUS at 23:52

## 2019-04-15 RX ADMIN — SODIUM CHLORIDE 40 MG: 9 INJECTION, SOLUTION INTRAMUSCULAR; INTRAVENOUS; SUBCUTANEOUS at 21:17

## 2019-04-15 RX ADMIN — ALUMINUM HYDROXIDE AND MAGNESIUM HYDROXIDE 15 ML: 200; 200 SUSPENSION ORAL at 22:55

## 2019-04-15 RX ADMIN — OXYCODONE AND ACETAMINOPHEN 1 TABLET: 5; 325 TABLET ORAL at 11:34

## 2019-04-15 RX ADMIN — OXYCODONE AND ACETAMINOPHEN 1 TABLET: 5; 325 TABLET ORAL at 03:58

## 2019-04-15 RX ADMIN — ONDANSETRON 4 MG: 2 INJECTION INTRAMUSCULAR; INTRAVENOUS at 12:05

## 2019-04-15 RX ADMIN — OXYCODONE AND ACETAMINOPHEN 1 TABLET: 5; 325 TABLET ORAL at 22:55

## 2019-04-15 RX ADMIN — DEXTROSE MONOHYDRATE, SODIUM CHLORIDE, AND POTASSIUM CHLORIDE: 50; 4.5; 2.98 INJECTION, SOLUTION INTRAVENOUS at 11:32

## 2019-04-15 NOTE — CONSULTS
Gastroenterology Consultation Note  Dr. Jackelin Villaseñor    NAME: Carmelita Chaves : 1980 MRN: [id number]    PCP: None  Date/Time:  4/15/2019 5:22 PM  Subjective:   REASON FOR CONSULT:      Landon Colin is a 41 y.o.  male who I was asked to see for hematemesis. Pt reports he started vomiting yesterday, he reports noticing some blood in the vomit yesterday nothing further. He never checked his sugar but has hx of type 1 DM. He last some nausea still and brought up a little bit of emesis today but no further blood. LBM was yesterday no melena or hematochezia. Currently tolerating CLD. Hgb A1C 13.1   BG on admission 629  . Anion Gap 35. No fever. Not compliant with taking any acid suppression other than occasional Tums OTC. Last EGD was 2018 with Dr. Levi Montgoemry: There was a one cm white ulcer at the distal esophagus. This may be candida or may be a healing Lillie-Estevez tear. I took brushings. Prior EGD 3/2015: There was linear ulceration that was mostly healed in the distal esophagus extending 5 cm c/w LA Grade C esophagitis with no bleeding. The squamo-columnar junction is at 41 cm from incisors where the Z-line was noted. Cold forceps biopsies performed of the distal 1/3 of the esophagus. Brushing also performed of lower esophagus to r/o Candida.     Stomach: There was scattered coffee grounds in the stomach with at least two erosions seen in body with old blood at base s/p ANETTE and Path bx. The fundus was found to be normal with no lesions noted on retroflexion.     Duodenum: The bulb and post bulbar mucosa is normal in appearance to the second portion.  The duodenal folds appeared normal.      Past Medical History:   Diagnosis Date    Bipolar 1 disorder, depressed ([address]     [facility]     Depression     Diabetes ([facility]     DKA, type 1 ([facility] 2013    diagnosed age 23    H/O noncompliance with medical treatment, presenting hazards to health  MRSA (methicillin resistant staph aureus) culture positive     MRSA (methicillin resistant Staphylococcus aureus)     Face    Noncompliance with medication regimen     Second hand smoke exposure     [facility]     Seizures ([facility] 2006 or 2007    one episode during custodial      Past Surgical History:   Procedure Laterality Date    HX HEENT      top left wisdom tooth    HX ORTHOPAEDIC Left     wrist; [facility]    UPPER GI ENDOSCOPY,BIOPSY  2018          Social History     Tobacco Use    Smoking status: Current Some Day Smoker     Packs/day: 0.10     Years: 16.00     Pack years: 1.60     Types: Cigarettes    Smokeless tobacco: Never Used   Substance Use Topics    Alcohol use: No      Family History   Problem Relation Age of Onset    Diabetes Mother     Diabetes Other         neice, type 1       No Known Allergies   Home Medications:  Prior to Admission Medications   Prescriptions Last Dose Informant Patient Reported? Taking?   insulin glargine (LANTUS U-100 INSULIN) 100 unit/mL injection 2019 at Unknown time Self Yes Yes   Si Units by SubCUTAneous route daily. insulin regular (NOVOLIN R) 100 unit/mL injection 3/31/2019 at Unknown time Self Yes Yes   Si-10 Units by SubCUTAneous route Before breakfast, lunch, and dinner.       Facility-Administered Medications: None     Hospital medications:  Current Facility-Administered Medications   Medication Dose Route Frequency    glucose chewable tablet 16 g  4 Tab Oral PRN    dextrose 5% and 0.9% NaCl infusion  100 mL/hr IntraVENous CONTINUOUS    insulin regular (NOVOLIN R, HUMULIN R) 100 Units in 0.9% sodium chloride 100 mL infusion  0-50 Units/hr IntraVENous TITRATE    insulin lispro (HUMALOG) injection   SubCUTAneous TIDAC    dextrose (D50W) injection syrg 12.5-25 g  25-50 mL IntraVENous PRN    glucagon (GLUCAGEN) injection 1 mg  1 mg IntraMUSCular PRN    acetaminophen (TYLENOL) tablet 1,000 mg  1,000 mg Oral Q6H PRN    pantoprazole (PROTONIX) 40 mg in sodium chloride 0.9% 10 mL injection  40 mg IntraVENous Q12H    ondansetron (ZOFRAN) injection 4 mg  4 mg IntraVENous Q4H PRN    oxyCODONE-acetaminophen (PERCOCET) 5-325 mg per tablet 1 Tab  1 Tab Oral Q4H PRN     REVIEW OF SYSTEMS:    Review of Systems -   History obtained from chart review and the patient  General ROS: negative for - weight loss  Psychological ROS: positive for - mood swings  Hematological and Lymphatic ROS: negative for - bleeding problems  Respiratory ROS: no cough, shortness of breath, or wheezing  Cardiovascular ROS: no chest pain or dyspnea on exertion  Gastrointestinal ROS: see HPI  Genito-Urinary ROS: no dysuria, trouble voiding, or hematuria  Musculoskeletal ROS: negative  Neurological ROS: no TIA or stroke symptoms  Dermatological ROS: negative    Objective:   VITALS:    Visit Vitals  /68 (BP 1 Location: Right arm, BP Patient Position: At rest)   Pulse 93   Temp 97.8 °F (36.6 °C)   Resp 18   Ht 5' 9\" (1.753 m)   Wt 57.7 kg (127 lb 3.2 oz)   SpO2 100%   BMI 18.78 kg/m²     Temp (24hrs), Av.9 °F (36.6 °C), Min:96.7 °F (35.9 °C), Max:98.2 °F (36.8 °C)    PHYSICAL EXAM:   General:    Alert, cooperative WM in no distress, appears stated age. Drowsy. Head:   Normocephalic, without obvious abnormality, atraumatic. Eyes:   Conjunctivae clear, anicteric sclerae. Pupils are equal  Nose:  Nares normal. No drainage or sinus tenderness. Throat:    Lips, mucosa, and tongue normal.  No Thrush  Neck:  Supple, symmetrical,  no adenopathy, thyroid: non tender  Back:    Symmetric,  No CVA tenderness. Lungs:   CTA bilaterally. No wheezing/rhonchi/rales. Heart:   Regular rate and rhythm,  no murmur, rub or gallop. Abdomen:   Soft, mild epigastric tenderness, nondistended, + BS, no HSM  Rectal:  deferred  Extremities: No cyanosis. No edema.  No clubbing  Skin:     Texture, turgor normal. No rashes/lesions/jaundice  Lymph: Cervical, supraclavicular normal.  Psych:  Good insight. Not anxious or agitated. Neurologic: No facial asymmetry. No aphasia or slurred speech normal strength, A/O X 3. LAB DATA REVIEWED:    Lab Results   Component Value Date/Time    WBC 12.5 (H) 04/14/2019 01:10 PM          HGB 17.9 (H) 04/14/2019 01:10 PM          HCT 51.2 (H) 04/14/2019 01:10 PM    PLATELET 933 36/23/5141 01:10 PM    MCV 93.1 04/14/2019 01:10 PM     Lab Results   Component Value Date/Time    ALT (SGPT) 81 (H) 04/14/2019 01:10 PM    AST (SGOT) 16 04/14/2019 01:10 PM    Alk. phosphatase 214 (H) 04/14/2019 01:10 PM    Bilirubin, direct 0.1 02/14/2016 04:44 AM    Bilirubin, total 0.5 04/14/2019 01:10 PM     Lab Results   Component Value Date/Time    Sodium 135 (L) 04/15/2019 12:27 PM    Potassium 3.8 04/15/2019 12:27 PM    Chloride 102 04/15/2019 12:27 PM    CO2 25 04/15/2019 12:27 PM    Anion gap 8 04/15/2019 12:27 PM    Glucose 193 (H) 04/15/2019 12:27 PM    BUN 33 (H) 04/15/2019 12:27 PM    Creatinine 1.22 04/15/2019 12:27 PM    BUN/Creatinine ratio 27 (H) 04/15/2019 12:27 PM    GFR est AA >60 04/15/2019 12:27 PM    GFR est non-AA >60 04/15/2019 12:27 PM    Calcium 8.5 04/15/2019 12:27 PM     Lab Results   Component Value Date/Time    Lipase 248 04/15/2019 04:41 AM     Lab Results   Component Value Date/Time    INR 1.0 11/20/2018 03:44 AM    INR 1.0 09/17/2016 06:42 AM    Prothrombin time 10.6 11/20/2018 03:44 AM    Prothrombin time 10.5 09/17/2016 06:42 AM       Impression:  N/V/Hematemesis  H/O poorly controlled type 1 DM presenting with DKA  Erosive Esophagitis on previous EGDs noncompliant with PPI or H2 blockers  LFT elevation     Plan:  Patient with n/v in setting of DKA now with some scant hematemesis and and a normal H/H. He is not currently having a hemodynamically significant UGI bleed but may have had some bleeding from esophagitis or MW tear. Last EGD within 6 mo and pt admits to not taking any acid suppression.   Will hold on EGD.  -PPI BID  -advance diet as tolerated  -after acute episode should have GES to r/o diabetic gastroparesis  -needs to fill his PPI at discharge  -will repeat U/S for elevation in ALT and ALP along with acute hepatitis panel         ___________________________________________________  Care Plan discussed with:    [x]    Patient   []    Family   []    Nursing   []    Attending   ___________________________________________________  GI: Isreal Spurling, MD

## 2019-04-15 NOTE — DIABETES MGMT
DTC Consult Note      Recommendations/ Comments: If appropriate, please consider continuing insulin gtt until anion gap is less than 12 on two consecutive BMPs and BG is less than 200 mg/dl and transition per hospital guidelines. Please consider having care worker/ speak with pt regarding care card process and number to call for updates. I believe it would benefit pt to see behavioral health counselor/ therapist.     Pt as been seen by DTC in past several times. Pt is not taking insulin correctly, and going without basal coverage by taking Lantus > 24 hours apart at times (switching when he gives doses). Pt also not taking sliding scale coverage as directed, not checking BG as directed. Current hospital DM medication: insulin gtt    Consult received for:  [x]             Assessment of home management                []      Medication Recommendations                []             Meter/monitoring     []             Insulin instruction     []             New diagnosis     []             Outpatient education     []             Insulin pump patient     []             Insulin infusion     []             DKA/HHS    Chart reviewed and initial evaluation complete on Juliet Laughter. Patient is a 41 y.o. male with known hx of Type 1 on Lantus 46 units morning or night, Novolin R 2-10 units sliding scale at home. Pt shared that he will sometimes take his Lantus morning or night, also does not use his sliding scale insulin. Pt has not been checking BG because \" I don't know\". Pt reports missing Lantus 1x/weekly, reduced appetite at baseline, often feels full. Pt will \"eat whatever is around\". Pt does not see endocrinologist, sees PCP for DM care. Denies lumpy, hard areas where he injects insulin, reports rotating injection sites. Pt obtains DM supplies from [facility].     Assessed and instructed patient on the following:   ·  blood sugar goals, complications of diabetes mellitus, illness management, nutrition and referred to Diabetes Educator  · Discussed Lantus duration in body, discussed taking around the same time daily, discussed that if pt is changing times of day when he takes Lantus, he will be going long periods of time without basal insulin which can lead to DKA very quickly  · Discussed calling [phone] number to determine acceptance of carecard to be able to see endocrinologist, discussed that pt would likely benefit from meal time insulin vs sliding scale given type 1 DM  · Discussed importance in taking correction insulin to help with BG elevations throughout the day  · Stressed importance in checking BG  · Discussed likelihood of readmission if no changes, discussed worsening gastroparesis with poor DM control   · Discussed expiration of Novolin R at room temp opened    Encouraged the following:   · dietary modifications: smaller snacks/meals to help with effects of gastroparesis, limit high fat foods, consider simple CHO when appetite reduced to help with tolerance, regular blood sugar monitorin-3x/daily, strongly encouraged pt see therapist to help with behaviors related to poor DM control    Provided patient with the following: [x]             Survival skills education materials               []             Insulin education materials               []             CHO counting education materials               [x]             Outpatient DTC contact number               []             Glucometer                   Discussed with patient and/or family need for follow up appointment for diabetes management after discharge. Pt shared that he applied for care card, but \"hasn't heard anything\" regarding if he was accepted or declined.       A1c:   Lab Results   Component Value Date/Time    Hemoglobin A1c 13.1 (H) 2019 10:16 PM       Recent Glucose Results:   Lab Results   Component Value Date/Time     (H) 04/15/2019 04:41 AM     (H) 2019 10:16 PM  (H) 04/14/2019 06:07 PM    GLUCPOC 147 (H) 04/15/2019 08:15 AM    GLUCPOC 119 (H) 04/15/2019 07:06 AM    GLUCPOC 103 (H) 04/15/2019 06:03 AM        Lab Results   Component Value Date/Time    Creatinine 1.32 (H) 04/15/2019 04:41 AM     Estimated Creatinine Clearance: 63.1 mL/min (A) (based on SCr of 1.32 mg/dL (H)). Active Orders   Diet    DIET DIABETIC CLEAR LIQUID        PO intake: No data found. Will continue to follow as needed. Thank you.     Chuck Ramos, [facility]    Office; [phone]    Time spent: 10 minutes

## 2019-04-15 NOTE — PROGRESS NOTES
PCU SHIFT NURSING NOTE  1900: The bedside  shift change report given to Khoa (oncoming nurse) by Tyson Wright  (offgoing nurse). Report included the following information SBAR, Kardex, Intake/Output, MAR, Recent Results, Med Rec Status, Cardiac Rhythm Sinus t6ach and Alarm Parameters . 11:17 PM  The patient resting in bed. Reported lower extremities pain paged MD for percocert order. administered as per orders. Zofrin administered for nausea. The patient is on insulin drip for DKA. 6:34 AM  The patient reported pain in the lower extremities  Percocert administered. Reported nausea. Zofran administered. On insulin drip. No signs of distress noted at this time. Admission Date 4/14/2019   Admission Diagnosis DKA, type 1, not at goal ([facility] [address] [E10.10]   Consults IP CONSULT TO HOSPITALIST        Consults   []PT   []OT   []Speech   []Case Management      [] Palliative      Cardiac Monitoring Order   []Yes   []No     IV drips   []Yes    Drip:                            Dose:  Drip:                            Dose:  Drip:                            Dose:   []No     GI Prophylaxis   []Yes   []No         DVT Prophylaxis   SCDs:             Parminder stockings:         [] Medication   []Contraindicated   []None      Activity Level Activity Level: Up with Assistance     Activity Assistance: Partial (one person)   Purposeful Rounding every 1-2 hour? []Yes   Ness Score  Total Score: 2   Bed Alarm (If score 3 or >)   []Yes   [] Refused (See signed refusal form in chart)   Corey Score  Corey Score: 19   Corey Score (if score 14 or less)   []PMT consult   []Wound Care consult      []Specialty bed   [] Nutrition consult          Needs prior to discharge:   Home O2 required:    []Yes   []No    If yes, how much O2 required?     Other:    Last Bowel Movement: Last Bowel Movement Date: 04/13/19      Influenza Vaccine          Pneumonia Vaccine           Diet Active Orders   Diet    DIET DIABETIC CLEAR LIQUID LDAs               Peripheral IV 04/14/19 Left Antecubital (Active)   Site Assessment Clean, dry, & intact 4/14/2019  7:23 PM   Phlebitis Assessment 0 4/14/2019  7:23 PM   Infiltration Assessment 0 4/14/2019  7:23 PM   Dressing Status Clean, dry, & intact 4/14/2019  7:23 PM   Dressing Type Tape;Transparent 4/14/2019  7:23 PM   Hub Color/Line Status Pink;End cap changed 4/14/2019  7:23 PM   Action Taken Open ports on tubing capped 4/14/2019  7:23 PM   Alcohol Cap Used Yes 4/14/2019  7:23 PM       Peripheral IV 04/14/19 Right Hand (Active)   Site Assessment Clean, dry, & intact 4/14/2019  7:23 PM   Phlebitis Assessment 0 4/14/2019  7:23 PM   Infiltration Assessment 0 4/14/2019  7:23 PM   Dressing Status Clean, dry, & intact 4/14/2019  7:23 PM   Dressing Type Tape;Transparent 4/14/2019  7:23 PM   Hub Color/Line Status Pink;End cap changed 4/14/2019  7:23 PM   Action Taken Open ports on tubing capped 4/14/2019  7:23 PM   Alcohol Cap Used Yes 4/14/2019  7:23 PM                      Urinary Catheter      Intake & Output Date 04/13/19 1900 - 04/14/19 0659(Not Admitted) 04/14/19 0700 - 04/15/19 0659   Shift 0897-1111 24 Hour Total 9531-7777 2335-4109 24 Hour Total   INTAKE   P.O.    1300 1300     P. O.    1300 1300   I.V.   172(0.3)  172     Insulin Volume   32  32     Volume (0.9% sodium chloride infusion)   140  140   Shift Total(mL/kg)   172(3.1) 1300(23.7) 1472(26.8)   OUTPUT   Urine   900(1.4) 200 1100     Urine Voided      Emesis/NG output    700 700     Emesis    700 700   Shift Total(mL/kg)   900(16.4) 900(16.4) 1800(32.8)   NET   -728 400 -328   Weight (kg)   54.8 54.8 54.8         Readmission Risk Assessment Tool Score Low Risk            11       Total Score        3 Has Seen PCP in Last 6 Months (Yes=3, No=0)    4 IP Visits Last 12 Months (1-3=4, 4=9, >4=11)    4 Pt. Coverage (Medicare=5 , Medicaid, or Self-Pay=4)        Criteria that do not apply:    . Living with Significant Other.  Assisted Living. LTAC. SNF.  or   Rehab    Patient Length of Stay (>5 days = 3)    Charlson Comorbidity Score (Age + Comorbid Conditions)       Expected Length of Stay - - -   Actual Length of Stay 0

## 2019-04-15 NOTE — CDMP QUERY
Account Number: [de-identified] MRN: [id number] Patient: Cristobal Moctezuma 
Created: [id number] Clinician Name: Carmen Cao RN, CCDS Dr. Yarelis Carmichael : 
Patient admitted with Type 1 DM in DKA, noted to have low BMI. If possible, please document in progress notes and d/c summary if you are evaluating and/or treating any of the following:  
 
=> Underweight 
=> Cachexia 
=> Failure to Thrive 
=> Other explanation of clinical findings  
=> Clinically Undetermined (no explanation for clinical findings) The medical record reflects the following: 
   Risk Factors: BMI 18, n/v several days, DM type 1 on insulin, poor po intake Clinical Indicators: fatigue,weakness,  poor po intake, BMI 18.78 kg/m2 5'9\", 127 lb Treatment: dietary consult.   
Thank Lorena Barlow RN, CCDS

## 2019-04-15 NOTE — DIABETES MGMT
DTC Progress Note    Recommendations/ Comments: noted consult for insulin instruction. Pt seen earlier this am. Please see note by Natalia Hopson RD for additional details. Please re-consult if needed.     Thank you,   Natalia Hopson, [address]

## 2019-04-15 NOTE — PROGRESS NOTES
Problem: Falls - Risk of  Goal: *Absence of Falls  Description  Document Selina Haq Fall Risk and appropriate interventions in the flowsheet.   Outcome: Progressing Towards Goal     Problem: Patient Education: Go to Patient Education Activity  Goal: Patient/Family Education  Outcome: Progressing Towards Goal     Problem: Patient Education: Go to Patient Education Activity  Goal: Patient/Family Education  Outcome: Progressing Towards Goal     Problem: DKA: Day 1  Goal: Off Pathway (Use only if patient is Off Pathway)  Outcome: Progressing Towards Goal  Goal: Activity/Safety  Outcome: Progressing Towards Goal  Goal: Consults, if ordered  Outcome: Progressing Towards Goal  Goal: Diagnostic Tests/Procedures, if Ordered  Outcome: Progressing Towards Goal  Goal: Nutrition/Diet  Outcome: Progressing Towards Goal  Goal: Discharge Planning  Outcome: Progressing Towards Goal  Goal: Medications  Outcome: Progressing Towards Goal  Goal: Respiratory  Outcome: Progressing Towards Goal  Goal: Treatments/Interventions/Procedures  Outcome: Progressing Towards Goal  Goal: Psychosocial  Outcome: Progressing Towards Goal  Goal: *Hemodynamically stable  Outcome: Progressing Towards Goal  Goal: *Blood glucose falling 50 to 100 mg/dl/hr  Outcome: Progressing Towards Goal  Goal: *Potassium normalizing  Outcome: Progressing Towards Goal

## 2019-04-15 NOTE — ED PROVIDER NOTES
EMERGENCY DEPARTMENT HISTORY AND PHYSICAL EXAM      Date: 4/14/2019  Patient Name: Sid Holcomb    History of Presenting Illness     Chief Complaint   Patient presents with    Fatigue    Vomiting       History Provided By: Patient    HPI: Sid Holcomb, 41 y.o. male with PMHx significant for IDDM, presents by EMS to the ED with cc of generalized weakness. Pt states beginning on Friday he has been generally feeling unwell and has not gotten out the bed. He has not been eating and minimal drinking has not been taking his insulin. EMS found a large amount of vomit in a container by the bed. Pt c/o feeling weak and fatigued. He is nauseated and has been vomiting. Pt denies any fever. He has not chest pain. He feels short of breath. He denies any abdominal pain. There is no diarrhea. There are no other complaints, changes, or physical findings at this time. PCP: None    No current facility-administered medications on file prior to encounter. Current Outpatient Medications on File Prior to Encounter   Medication Sig Dispense Refill    insulin glargine (LANTUS U-100 INSULIN) 100 unit/mL injection 46 Units by SubCUTAneous route daily.  insulin regular (NOVOLIN R) 100 unit/mL injection 2-10 Units by SubCUTAneous route Before breakfast, lunch, and dinner.          Past History     Past Medical History:  Past Medical History:   Diagnosis Date    Bipolar 1 disorder, depressed ([address]     Bipolar disorder ([facility] [address]     Depression     Diabetes ([facility] [address]     DKA, type 1 ([facility] [address] 1/27/2013    diagnosed age 23    H/O noncompliance with medical treatment, presenting hazards to health     MRSA (methicillin resistant staph aureus) culture positive     MRSA (methicillin resistant Staphylococcus aureus)     Face    Noncompliance with medication regimen     Second hand smoke exposure     Seizure ([address]     Seizures ([address] 2006 or 2007    one episode during jail       Past Surgical History:  Past Surgical History: Procedure Laterality Date    HX HEENT      top left wisdom tooth    HX ORTHOPAEDIC Left     wrist; MCV    UPPER GI ENDOSCOPY,BIOPSY  11/20/2018            Family History:  Family History   Problem Relation Age of Onset    Diabetes Mother     Diabetes Other         neice, type 1        Social History:  Social History     Tobacco Use    Smoking status: Current Some Day Smoker     Packs/day: 0.10     Years: 16.00     Pack years: 1.60     Types: Cigarettes    Smokeless tobacco: Never Used   Substance Use Topics    Alcohol use: No    Drug use: No       Allergies:  No Known Allergies      Review of Systems   Review of Systems   Constitutional: Positive for activity change, appetite change and fatigue. Negative for chills and fever. HENT: Negative. Negative for congestion, rhinorrhea, sinus pressure and sore throat. Eyes: Negative. Respiratory: Positive for shortness of breath. Negative for cough, choking, chest tightness and wheezing. Cardiovascular: Negative. Negative for chest pain, palpitations and leg swelling. Gastrointestinal: Positive for nausea and vomiting. Negative for abdominal pain, blood in stool, constipation and diarrhea. Endocrine: Negative. IDDM, pt c/o thirst   Genitourinary: Positive for decreased urine volume. Negative for difficulty urinating, dysuria, flank pain and urgency. Musculoskeletal: Negative. Skin: Negative. Neurological: Positive for weakness (generalized) and light-headedness. Negative for dizziness, speech difficulty, numbness and headaches. Psychiatric/Behavioral: Negative. All other systems reviewed and are negative. Physical Exam   Physical Exam   Constitutional: He is oriented to person, place, and time. He appears distressed. Thin, ill appearing male   HENT:   Head: Normocephalic and atraumatic. Mouth/Throat: No oropharyngeal exudate. Dry mucus membranes   Eyes: Pupils are equal, round, and reactive to light.  Conjunctivae and EOM are normal.   Neck: Normal range of motion. Neck supple. No JVD present. No tracheal deviation present. Cardiovascular: Regular rhythm, normal heart sounds and intact distal pulses. No murmur heard. Tachycardic   Pulmonary/Chest: Effort normal and breath sounds normal. No stridor. No respiratory distress. He has no wheezes. He has no rales. He exhibits no tenderness. Abdominal: Soft. He exhibits no distension. There is no tenderness. There is no rebound and no guarding. Musculoskeletal: Normal range of motion. He exhibits no edema or tenderness. Neurological: He is alert and oriented to person, place, and time. No cranial nerve deficit. No gross motor or sensory deficits    Skin: Skin is warm and dry. He is not diaphoretic. Psychiatric: His behavior is normal.   Flat affect, depressed mood   Nursing note and vitals reviewed.       Diagnostic Study Results     Labs -     Recent Results (from the past 12 hour(s))   GLUCOSE, POC    Collection Time: 04/14/19  3:43 PM   Result Value Ref Range    Glucose (POC) >600 (HH) 65 - 100 mg/dL    Performed by Mariangel Almendarez    Collection Time: 04/14/19  3:47 PM   Result Value Ref Range    Glucose 601 mg/dL    Insulin order 10.8 units/hour    Insulin adminstered 10.8 units/hour    Multiplier 0.020     Low target 150 mg/dL    High target 250 mg/dL    D50 order 0.0 ml    D50 administered 0.00 ml    Minutes until next BG 60 min    Order initials anl     Administered initials anl     GLSCOM Comments     GLUCOSE, POC    Collection Time: 04/14/19  5:00 PM   Result Value Ref Range    Glucose (POC) >600 (HH) 65 - 100 mg/dL    Performed by Georgia Barraza    Collection Time: 04/14/19  5:01 PM   Result Value Ref Range    Glucose DELETED mg/dL    Insulin order DELETED units/hour    Insulin adminstered DELETED units/hour    Multiplier DELETED     Low target DELETED mg/dL    High target DELETED mg/dL    D50 order DELETED ml    D50 administered DELETED ml    Minutes until next BG DELETED min    Order initials DELETED     Administered initials DELETED     GLSCOM Comments DELETED    GLUCOSE, POC    Collection Time: 04/14/19  5:02 PM   Result Value Ref Range    Glucose (POC) 587 (H) 65 - 100 mg/dL    Performed by Mir Martinez    Collection Time: 04/14/19  5:03 PM   Result Value Ref Range    Glucose 587 mg/dL    Insulin order 15.8 units/hour    Insulin adminstered 15.8 units/hour    Multiplier 0.030     Low target 150 mg/dL    High target 250 mg/dL    D50 order 0.0 ml    D50 administered 0.00 ml    Minutes until next BG 60 min    Order initials bb     Administered initials bb     GLSCOM Comments rechecked    GLUCOSE, POC    Collection Time: 04/14/19  6:02 PM   Result Value Ref Range    Glucose (POC) 416 (H) 65 - 100 mg/dL    Performed by [facility]    METABOLIC PANEL, BASIC    Collection Time: 04/14/19  6:07 PM   Result Value Ref Range    Sodium 136 136 - 145 mmol/L    Potassium 4.1 3.5 - 5.1 mmol/L    Chloride 101 97 - 108 mmol/L    CO2 8 (LL) 21 - 32 mmol/L    Anion gap 27 (H) 5 - 15 mmol/L    Glucose 479 (H) 65 - 100 mg/dL    BUN 40 (H) 6 - 20 MG/DL    Creatinine 1.92 (H) 0.70 - 1.30 MG/DL    BUN/Creatinine ratio 21 (H) 12 - 20      GFR est AA 48 (L) >60 ml/min/1.73m2    GFR est non-AA 40 (L) >60 ml/min/1.73m2    Calcium 8.8 8.5 - 10.1 MG/DL   MAGNESIUM    Collection Time: 04/14/19  6:07 PM   Result Value Ref Range    Magnesium 2.5 (H) 1.6 - 2.4 mg/dL   PHOSPHORUS    Collection Time: 04/14/19  6:07 PM   Result Value Ref Range    Phosphorus 4.1 2.6 - 4.7 MG/DL   GLUCOSTABILIZER    Collection Time: 04/14/19  6:08 PM   Result Value Ref Range    Glucose 416 mg/dL    Insulin order 10.7 units/hour    Insulin adminstered 10.7 units/hour    Multiplier 0.030     Low target 150 mg/dL    High target 250 mg/dL    D50 order 0.0 ml    D50 administered 0.00 ml    Minutes until next BG 60 min    Order initials bb     Administered initials bb     GLSCOM Comments     GLUCOSE, POC    Collection Time: 04/14/19  7:10 PM   Result Value Ref Range    Glucose (POC) 360 (H) 65 - 100 mg/dL    Performed by Karina Nguyen    Collection Time: 04/14/19  7:15 PM   Result Value Ref Range    Glucose 360 mg/dL    Insulin order 12.0 units/hour    Insulin adminstered 12.0 units/hour    Multiplier 0.040     Low target 150 mg/dL    High target 250 mg/dL    D50 order 0.0 ml    D50 administered 0.00 ml    Minutes until next BG 60 min    Order initials bb     Administered initials bb     GLSCOM Comments     GLUCOSE, POC    Collection Time: 04/14/19  8:17 PM   Result Value Ref Range    Glucose (POC) 327 (H) 65 - 100 mg/dL    Performed by Buzz Pineda (PCT)    GLUCOSTABILIZER    Collection Time: 04/14/19  8:19 PM   Result Value Ref Range    Glucose 327 mg/dL    Insulin order 13.4 units/hour    Insulin adminstered 13.4 units/hour    Multiplier 0.050     Low target 150 mg/dL    High target 250 mg/dL    D50 order 0.0 ml    D50 administered 0.00 ml    Minutes until next BG 60 min    Order initials ak     Administered initials ak     GLSCOM Comments     GLUCOSE, POC    Collection Time: 04/14/19  9:25 PM   Result Value Ref Range    Glucose (POC) 276 (H) 65 - 100 mg/dL    Performed by Katja Lemus (RN)    GLUCOSTABILIZER    Collection Time: 04/14/19  9:26 PM   Result Value Ref Range    Glucose 276 mg/dL    Insulin order 13.0 units/hour    Insulin adminstered 13.0 units/hour    Multiplier 0.060     Low target 150 mg/dL    High target 250 mg/dL    D50 order 0.0 ml    D50 administered 0.00 ml    Minutes until next BG 60 min    Order initials ak     Administered initials ak     GLSCOM Comments     METABOLIC PANEL, BASIC    Collection Time: 04/14/19 10:16 PM   Result Value Ref Range    Sodium 139 136 - 145 mmol/L    Potassium 3.1 (L) 3.5 - 5.1 mmol/L    Chloride 104 97 - 108 mmol/L    CO2 20 (L) 21 - 32 mmol/L    Anion gap 15 5 - 15 mmol/L    Glucose 286 (H) 65 - 100 mg/dL    BUN 40 (H) 6 - 20 MG/DL    Creatinine 1.66 (H) 0.70 - 1.30 MG/DL    BUN/Creatinine ratio 24 (H) 12 - 20      GFR est AA 57 (L) >60 ml/min/1.73m2    GFR est non-AA 47 (L) >60 ml/min/1.73m2    Calcium 8.3 (L) 8.5 - 10.1 MG/DL   MAGNESIUM    Collection Time: 04/14/19 10:16 PM   Result Value Ref Range    Magnesium 2.2 1.6 - 2.4 mg/dL   GLUCOSE, POC    Collection Time: 04/14/19 10:29 PM   Result Value Ref Range    Glucose (POC) 271 (H) 65 - 100 mg/dL    Performed by Carly Lay    Collection Time: 04/14/19 10:29 PM   Result Value Ref Range    Glucose 271 mg/dL    Insulin order 14.8 units/hour    Insulin adminstered 14.8 units/hour    Multiplier 0.070     Low target 150 mg/dL    High target 250 mg/dL    D50 order 0.0 ml    D50 administered 0.00 ml    Minutes until next BG 60 min    Order initials ak     Administered initials ak     GLSCOM Comments     GLUCOSE, POC    Collection Time: 04/14/19 11:31 PM   Result Value Ref Range    Glucose (POC) 214 (H) 65 - 100 mg/dL    Performed by Christine Burgess (PCT)    GLUCOSTABILIZER    Collection Time: 04/14/19 11:34 PM   Result Value Ref Range    Glucose 214 mg/dL    Insulin order 10.8 units/hour    Insulin adminstered 10.8 units/hour    Multiplier 0.070     Low target 150 mg/dL    High target 250 mg/dL    D50 order 0.0 ml    D50 administered 0.00 ml    Minutes until next BG 60 min    Order initials ak     Administered initials ak     GLSCOM Comments     GLUCOSE, POC    Collection Time: 04/15/19 12:36 AM   Result Value Ref Range    Glucose (POC) 177 (H) 65 - 100 mg/dL    Performed by Carly Lay    Collection Time: 04/15/19 12:37 AM   Result Value Ref Range    Glucose 177 mg/dL    Insulin order 8.2 units/hour    Insulin adminstered 8.2 units/hour    Multiplier 0.070     Low target 150 mg/dL    High target 250 mg/dL    D50 order 0.0 ml    D50 administered 0.00 ml    Minutes until next BG 60 min    Order initials ak     Administered initials ak     GLSCOM Comments     GLUCOSTABILIZER    Collection Time: 04/15/19  1:41 AM   Result Value Ref Range    Glucose 145 mg/dL    Insulin order 4.8 units/hour    Insulin adminstered 4.8 units/hour    Multiplier 0.056     Low target 150 mg/dL    High target 250 mg/dL    D50 order 0.0 ml    D50 administered 0.00 ml    Minutes until next BG 60 min    Order initials ak     Administered initials a.k     GLSCOM Comments         Radiologic Studies -   No orders to display     CT Results  (Last 48 hours)    None        CXR Results  (Last 48 hours)    None            Medical Decision Making   I am the first provider for this patient. I reviewed the vital signs, available nursing notes, past medical history, past surgical history, family history and social history. Vital Signs-Reviewed the patient's vital signs. Patient Vitals for the past 12 hrs:   Temp Pulse Resp BP SpO2   04/14/19 2255 98.2 °F (36.8 °C) 100 20 112/72 100 %   04/14/19 1923 98.1 °F (36.7 °C) (!) 116 20 122/87 100 %   04/14/19 1812 96.7 °F (35.9 °C) (!) 109 21 124/79 100 %   04/14/19 1643 96.5 °F (35.8 °C) (!) 122 22 157/86 98 %   04/14/19 1600 98.7 °F (37.1 °C) (!) 116 25 110/47 100 %   04/14/19 1433 -- (!) 124 26 -- 100 %   04/14/19 1430 -- (!) 123 25 140/77 --   04/14/19 1415 -- (!) 128 (!) 37 117/79 99 %       Pulse Oximetry Analysis - 100% on RA    Cardiac Monitor:   Rate: 110 bpm  Rhythm: Sinus Tachycardia      EKG interpretation: (Preliminary)  Sinus tach, rate 107, rightward axis, normal pr/qrs, no acute ST changes. Records Reviewed: Nursing Notes, Old Medical Records, Ambulance Run Sheet, Previous Radiology Studies and Previous Laboratory Studies    Provider Notes (Medical Decision Making):   DDx- DKA, HHNK, Dehydration, FELECIA, electrolyte abnormality    ED Course:   Initial assessment performed.  The patients presenting problems have been discussed, and they are in agreement with the care plan formulated and outlined with them. I have encouraged them to ask questions as they arise throughout their visit. PT labs c/w DKA, pt had been given 2L NS, anti-emetic, tolerating drinking water. Will start insulin gtt, discuss with hospitalist. Will need admission for further treatment. Critical Care Time:   CRITICAL CARE NOTE :    IMPENDING DETERIORATION -Metabolic and Renal  ASSOCIATED RISK FACTORS - Metabolic changes and Dehydration  MANAGEMENT- Bedside Assessment and Supervision of Care  INTERPRETATION -  Xrays, ECG, Blood Pressure, Cardiac Output Measures  and Labs  INTERVENTIONS - hemodynamic mngmt and Metobolic interventions  CASE REVIEW - Hospitalist and Nursing  TREATMENT RESPONSE -Stable  PERFORMED BY - Self    NOTES   :  I have spent 40 minutes of critical care time involved in lab review, consultations with specialist, family decision- making, bedside attention and documentation. During this entire length of time I was immediately available to the patient . Negin Enriquez,     Disposition:  Consult Note: Pt in DKA, given 2L IV fluids started on insulin gtt. Discussed case with Dr. Baljit Reynolds who will see and evaluate pt for admission    PLAN:  1. Admit      Diagnosis     Clinical Impression:   1.  Diabetic ketoacidosis without coma associated with type 1 diabetes mellitus ([facility] [address]

## 2019-04-15 NOTE — PROGRESS NOTES
Hospitalist Progress Note    NAME: Joyce Martinez   :  1980   MRN:  [id number]       Assessment / Plan:  Type 1 DM, uncontrolled in DKA: so far anion gap has closed but patient is unable to eat and vomiting, c/w insulin drip for now, monitor  HbA1C 13.1  Intractable nausea and vomiting as per staff had hematemesis, monitor. Abdominal pain/Hematemesis? : c/w PPI, get GI evaluation, monitor  FELECIA so far improved with IVF, monitor  Volume depletion   Lactic acidosis  So far resolved. Hx Depression / Bipolar   Hx Seizures  H/O Hepatitis C:  LFT so far stable, monitor   Normal Weight: BMI 18.78  Code status: Full  Prophylaxis: SCD's  Recommended Disposition: Home w/Family     Critically ill still on insulin drip not able to tolerate PO     Subjective:     Chief Complaint / Reason for Physician Visit  \"My belly hurts and passed blood\". Discussed with RN events overnight. Review of Systems:  Symptom Y/N Comments  Symptom Y/N Comments   Fever/Chills    Chest Pain     Poor Appetite    Edema     Cough    Abdominal Pain y    Sputum    Joint Pain     SOB/JOHNSTON    Pruritis/Rash     Nausea/vomit    Tolerating PT/OT     Diarrhea    Tolerating Diet     Constipation    Other       Could NOT obtain due to:      Objective:     VITALS:   Last 24hrs VS reviewed since prior progress note.  Most recent are:  Patient Vitals for the past 24 hrs:   Temp Pulse Resp BP SpO2   04/15/19 1103 97.9 °F (36.6 °C) 100 18 140/81 100 %   04/15/19 0939 98.2 °F (36.8 °C) 94 18 139/86 100 %   04/15/19 0721 98.1 °F (36.7 °C) 89 18 131/71 100 %   04/15/19 0400 98.1 °F (36.7 °C) (!) 101 18 133/81 100 %   19 2255 98.2 °F (36.8 °C) 100 20 112/72 100 %   19 1923 98.1 °F (36.7 °C) (!) 116 20 122/87 100 %   19 1812 96.7 °F (35.9 °C) (!) 109 21 124/79 100 %   19 1643 96.5 °F (35.8 °C) (!) 122 22 157/86 98 %   19 1600 98.7 °F (37.1 °C) (!) 116 25 110/47 100 %   19 1433 -- (!) 124 26 -- 100 %   19 1430 -- Kaity Warren 123 25 140/77 --   04/14/19 1415 -- (!) 128 (!) 37 117/79 99 %       Intake/Output Summary (Last 24 hours) at 4/15/2019 1347  Last data filed at 4/15/2019 1200  Gross per 24 hour   Intake 4508. 12 ml   Output 4250 ml   Net 258.12 ml        PHYSICAL EXAM:  General: WD, WN. Alert, cooperative, no acute distress    EENT:  EOMI. Anicteric sclerae. MMM  Resp:  CTA bilaterally, no wheezing or rales. No accessory muscle use  CV:  Regular  rhythm,  No edema  GI:  Soft, Non distended, Non tender.  +Bowel sounds  Neurologic:  Alert and oriented X 3, normal speech,   Psych:   Good insight. Not anxious nor agitated  Skin:  No rashes. No jaundice    Reviewed most current lab test results and cultures  YES  Reviewed most current radiology test results   YES  Review and summation of old records today    NO  Reviewed patient's current orders and MAR    YES  PMH/SH reviewed - no change compared to H&P  ________________________________________________________________________  Care Plan discussed with:    Comments   Patient y    Family      RN y    Care Manager     Consultant                        Multidiciplinary team rounds were held today with , nursing, pharmacist and clinical coordinator. Patient's plan of care was discussed; medications were reviewed and discharge planning was addressed. ________________________________________________________________________  Total NON critical care TIME:     Minutes    Total CRITICAL CARE TIME Spent: 35   Minutes non procedure based      Comments   >50% of visit spent in counseling and coordination of care y    ________________________________________________________________________  Elina Fuentes MD     Procedures: see electronic medical records for all procedures/Xrays and details which were not copied into this note but were reviewed prior to creation of Plan. LABS:  I reviewed today's most current labs and imaging studies.   Pertinent labs include:  Recent Labs 04/14/19  1310   WBC 12.5*   HGB 17.9*   HCT 51.2*        Recent Labs     04/15/19  1227 04/15/19  0441 04/14/19  2216 04/14/19  1807 04/14/19  1310   * 138 139 136 132*   K 3.8 3.6 3.1* 4.1 4.5    105 104 101 90*   CO2 25 24 20* 8* 7*   * 109* 286* 479* 629*   BUN 33* 38* 40* 40* 38*   CREA 1.22 1.32* 1.66* 1.92* 2.09*   CA 8.5 8.5 8.3* 8.8 9.4   MG 2.0 2.1 2.2 2.5* 2.6*   PHOS  --   --   --  4.1  --    ALB  --   --   --   --  4.3   TBILI  --   --   --   --  0.5   SGOT  --   --   --   --  16   ALT  --   --   --   --  81*       Signed: Juana Bowden MD

## 2019-04-15 NOTE — PROGRESS NOTES
PCU SHIFT NURSING NOTE      Bedside and Verbal shift change report given to García Bob RN (oncoming nurse) by Kalina Byrne RN  (offgoing nurse). Report included the following information SBAR, Kardex, ED Summary, Procedure Summary, Intake/Output, MAR, Recent Results, Med Rec Status, Cardiac Rhythm NSR, Alarm Parameters  and Quality Measures. Shift Summary:   0721--0Patient is resting in bed with no complaints at this time. Patient has head covered with blankets but is comfortable. Patient standing to urinate on side of bed. Held urinal for patient and he missed and urinated on floor and my hand. Wash my hands after cleaning up the floor and putting patient back to bed. Call bell within reach and he states he just wants to rest for now. Will monitor and finish assessment. 0828--Blood glucose taken and is 147. Paused Insulin drip for 60 minutes per glucostabilizer. Will recheck in 60 minutes and adjust if prompted to. Patient is still resting in bed with two pillows, three blankets, no socks, and urinal at bedside if needed for urination. Will assist as needed with urinal because he needs to  order to urinate. 0939--Vitals taken and recorded in [facility]. (SEE CHART)! !  0951--Had to wait to find another nurse to cosign insulin being paused. Patients insulin gtt is paused again for another hour per the glucostabilizer. Will recheck in an hour and adjust as needed. Patient is still having nausea and vomiting but is continuing to drink per the doctor allowing. 1004--Got patient some cold water and wiped his nose for him. Threw away his emesis bag and gave him a clean one. 1045--Patients insulin drip is still being held for another 60 minutes. Will reassess in 60 minutes. Ordered another bag of fluids from pharmacy for patient. 1132--New bag of fluids hung that was sent from pharmacy  D5 with 1/2 NS with 40 mEq of potassium at 100 cc/hr.     1134--Patient given 5 mg of Oxycodone for a pain level of 8. Will revaluate in one hour. 1154--Insulin gtt restarted at 0.1 Units/hr. Blood glucose was 158.    1200--Attempted to draw BMP. Will ask another nurse to attempt because patient is a hard stick for labs. Reassessed patients pain level and he is now at a three out of ten. Will continue to assess. 1205--Zofran 4 mg given for nausea. Will reassess in one hour. 1227--BMP draw by Mckayla Potts and sent to lab for testing. .  1242--Consult for GI put in for patient due to coffee ground emesis that he is vomiting. [place] will call consult. 1308--Patients insulin gtt is still at 0.1 Units/hr with no change. Will recheck in one hour. 1340--Dr. Toney Soliz closed out the insulin gtt running and started the same insulin gtt back. The insulin gtt is running at 0.1 Units/hr. 1447--Checked patients blood glucose (226) and the insulin gtt is still staying at 0.1 Units/hr [facility] [address] [facility]. Will monitor and assess patients progress. 1457--Changed fluids from D5 with 1/2 NS with 40 of mEq at 100 cc/hr to D5 at with NS at 100 cc/hr. 1534--Patient is going down for Xray on abdominal Flat/Erect. Ferny Hui is going down with patient because a nurse needs to go with him because of the insulin. 1545--Patient is off floor to xray. 1631--Patient is still downstairs waiting for xray with Ferny Hui. Sent CNA down to xray to get blood glucose on patient and Ferny Hui called to give me the results. Put blood glucose (264) in glucostabilizer and the new rate is 4.1. Changed in computer and Linda Barnard RN completed the dual sign off after reviewing the information present by Ferny Hui and the CNA with the blood glucose result. 1642--Patient is back in room from xray and resting in bed with no complaints at this time. 1700--Dr. Helder Bergeron has consulted on pateint. Orders are to come.   Will wait on new orders. 1729--Patients blood glucose in now 232 and his new insulin gtt rate is being changed to 3.4 after putting into glucostabilizer. 1730--Dr. Audrey Starr has ordered an Ultrasound Abdomen complete routine and some lab work for tomorrow morning. 1736--Patient given Percocet 5 mg for pain level of 8. Will reassess in one hour. 1800--Reassessed patients pain level and it is currently a 5. Will reassess again in thirty minutes. 1830--Patient blood glucose assessed and it is now 210. Put 210 into glucostabilizer and the new rate is now 3.0. Patient is resting in bed sleeping with cover over his head. 1851--Reassessed patients pain level again and it is still a 5. Will continue to monitor. 1855--Ultrasound called to make sure that the patient is NPO at [facility] for ultrasound in the morning. Patient will need nurse to go with him because he is on Insulin gtt. Will put in NPO order as requested and once ultrasound is completed he can go back to Diabetic Clear liquids. Admission Date 4/14/2019   Admission Diagnosis DKA, type 1, not at goal ([address] [E10.10]   Consults IP CONSULT TO HOSPITALIST        Consults   [x]PT   [x]OT   []Speech   [x]Case Management      [] Palliative      Cardiac Monitoring Order   [x]Yes   []No     IV drips   [x]Yes    Drip:       Insulin gtt      Dose: titration  Drip:                            Dose:  Drip:                            Dose:   []No     GI Prophylaxis   [x]Yes   []No         DVT Prophylaxis   SCDs:             Parminder stockings:         [x] Medication   []Contraindicated   []None      Activity Level Activity Level: Up with Assistance     Activity Assistance: Partial (one person)   Purposeful Rounding every 1-2 hour?    [x]Yes   Ness Score  Total Score: 2   Bed Alarm (If score 3 or >)   [x]Yes   [] Refused (See signed refusal form in chart)   Corey Score  Corey Score: 22   Corey Score (if score 14 or less)   [x]PMT consult   []Wound Care consult []Specialty bed   [x] Nutrition consult          Needs prior to discharge:   Home O2 required:    []Yes   [x]No    If yes, how much O2 required? Other:    Last Bowel Movement: Last Bowel Movement Date: 04/13/19      Influenza Vaccine          Pneumonia Vaccine           Diet Active Orders   Diet    DIET DIABETIC CLEAR LIQUID      LDAs               Peripheral IV 04/14/19 Left Antecubital (Active)   Site Assessment Clean, dry, & intact 4/15/2019  7:21 AM   Phlebitis Assessment 0 4/15/2019  7:21 AM   Infiltration Assessment 0 4/15/2019  7:21 AM   Dressing Status Clean, dry, & intact 4/15/2019  7:21 AM   Dressing Type Tape;Transparent 4/15/2019  7:21 AM   Hub Color/Line Status Pink;Capped;Flushed 4/15/2019  7:21 AM   Action Taken Open ports on tubing capped 4/15/2019  7:21 AM   Alcohol Cap Used Yes 4/15/2019  7:21 AM       Peripheral IV 04/14/19 Right Hand (Active)   Site Assessment Clean, dry, & intact 4/15/2019  7:21 AM   Phlebitis Assessment 0 4/15/2019  7:21 AM   Infiltration Assessment 0 4/15/2019  7:21 AM   Dressing Status Clean, dry, & intact 4/15/2019  7:21 AM   Dressing Type Tape;Transparent 4/15/2019  7:21 AM   Hub Color/Line Status Pink; Infusing;Flushed 4/15/2019  7:21 AM   Action Taken Open ports on tubing capped 4/15/2019  7:21 AM   Alcohol Cap Used Yes 4/15/2019  7:21 AM                      Urinary Catheter      Intake & Output Date 04/14/19 0700 - 04/15/19 0659 04/15/19 0700 - 04/16/19 0659   Shift 2651-4804 4408-8419 24 Hour Total 0464-3845 3665-9948 24 Hour Total   INTAKE   P.O.  2000 2000 300  300     P. O.  2000 2000 300  300   I. V.(mL/kg/hr) 172(0.3) 733. 9(1.1) 905.9(0.7) 36.8  36.8     Insulin Volume 32 100.6 132.6 0.2  0.2     Volume (0.9% sodium chloride infusion) 140  140        Volume (dextrose 5% - 0.45% NaCl with KCl 40 mEq/L infusion)  633.3 633.3 36.7  36.7   Shift Total(mL/kg) 172(3.1) 2733. 9(49.9) 2905.9(53) 336.8(5.8)  336.8(5.8)   OUTPUT   Urine(mL/kg/hr) 900(1.4) 800(1.2) 1700(1.3) 500  500     Urine Voided  500  500   Emesis/NG output  700 700        Emesis  700 700      Shift Total(mL/kg) 900(16.4) 1500(27.4) 2400(43.8) 500(8.7)  500(8.7)   NET -728 1233.9 505.9 -163.2  -163.2   Weight (kg) 54.8 54.8 54.8 57.7 57.7 57.7         Readmission Risk Assessment Tool Score Low Risk            11       Total Score        3 Has Seen PCP in Last 6 Months (Yes=3, No=0)    4 IP Visits Last 12 Months (1-3=4, 4=9, >4=11)    4 Pt. Coverage (Medicare=5 , Medicaid, or Self-Pay=4)        Criteria that do not apply:    . Living with Significant Other. Assisted Living. LTAC. SNF.  or   Rehab    Patient Length of Stay (>5 days = 3)    Charlson Comorbidity Score (Age + Comorbid Conditions)       Expected Length of Stay - - -   Actual Length of Stay 1

## 2019-04-16 ENCOUNTER — APPOINTMENT (OUTPATIENT)
Dept: ULTRASOUND IMAGING | Age: 39
DRG: 638 | End: 2019-04-16
Attending: SPECIALIST
Payer: SELF-PAY

## 2019-04-16 PROBLEM — R11.2 NAUSEA WITH VOMITING: Status: ACTIVE | Noted: 2019-04-16

## 2019-04-16 LAB
ADMINISTERED INITIALS, ADMINIT: NORMAL
ALBUMIN SERPL-MCNC: 2.7 G/DL (ref 3.5–5)
ALBUMIN/GLOB SERPL: 1 {RATIO} (ref 1.1–2.2)
ALP SERPL-CCNC: 126 U/L (ref 45–117)
ALT SERPL-CCNC: 38 U/L (ref 12–78)
ANION GAP SERPL CALC-SCNC: 8 MMOL/L (ref 5–15)
AST SERPL-CCNC: 15 U/L (ref 15–37)
BASOPHILS # BLD: 0 K/UL (ref 0–0.1)
BASOPHILS NFR BLD: 0 % (ref 0–1)
BILIRUB DIRECT SERPL-MCNC: 0.1 MG/DL (ref 0–0.2)
BILIRUB SERPL-MCNC: 0.5 MG/DL (ref 0.2–1)
BUN SERPL-MCNC: 20 MG/DL (ref 6–20)
BUN/CREAT SERPL: 22 (ref 12–20)
CALCIUM SERPL-MCNC: 7.8 MG/DL (ref 8.5–10.1)
CHLORIDE SERPL-SCNC: 106 MMOL/L (ref 97–108)
CO2 SERPL-SCNC: 26 MMOL/L (ref 21–32)
COMMENT, HOLDF: NORMAL
CREAT SERPL-MCNC: 0.89 MG/DL (ref 0.7–1.3)
D50 ADMINISTERED, D50ADM: 0 ML
D50 ORDER, D50ORD: 0 ML
DIFFERENTIAL METHOD BLD: ABNORMAL
EOSINOPHIL # BLD: 0.1 K/UL (ref 0–0.4)
EOSINOPHIL NFR BLD: 1 % (ref 0–7)
ERYTHROCYTE [DISTWIDTH] IN BLOOD BY AUTOMATED COUNT: 12.9 % (ref 11.5–14.5)
GLOBULIN SER CALC-MCNC: 2.8 G/DL (ref 2–4)
GLSCOM COMMENTS: NORMAL
GLUCOSE BLD STRIP.AUTO-MCNC: 134 MG/DL (ref 65–100)
GLUCOSE BLD STRIP.AUTO-MCNC: 147 MG/DL (ref 65–100)
GLUCOSE BLD STRIP.AUTO-MCNC: 159 MG/DL (ref 65–100)
GLUCOSE BLD STRIP.AUTO-MCNC: 173 MG/DL (ref 65–100)
GLUCOSE BLD STRIP.AUTO-MCNC: 180 MG/DL (ref 65–100)
GLUCOSE BLD STRIP.AUTO-MCNC: 183 MG/DL (ref 65–100)
GLUCOSE BLD STRIP.AUTO-MCNC: 187 MG/DL (ref 65–100)
GLUCOSE BLD STRIP.AUTO-MCNC: 191 MG/DL (ref 65–100)
GLUCOSE BLD STRIP.AUTO-MCNC: 192 MG/DL (ref 65–100)
GLUCOSE BLD STRIP.AUTO-MCNC: 194 MG/DL (ref 65–100)
GLUCOSE BLD STRIP.AUTO-MCNC: 198 MG/DL (ref 65–100)
GLUCOSE BLD STRIP.AUTO-MCNC: 204 MG/DL (ref 65–100)
GLUCOSE BLD STRIP.AUTO-MCNC: 206 MG/DL (ref 65–100)
GLUCOSE BLD STRIP.AUTO-MCNC: 208 MG/DL (ref 65–100)
GLUCOSE BLD STRIP.AUTO-MCNC: 215 MG/DL (ref 65–100)
GLUCOSE BLD STRIP.AUTO-MCNC: 242 MG/DL (ref 65–100)
GLUCOSE BLD STRIP.AUTO-MCNC: 255 MG/DL (ref 65–100)
GLUCOSE BLD STRIP.AUTO-MCNC: 255 MG/DL (ref 65–100)
GLUCOSE BLD STRIP.AUTO-MCNC: 256 MG/DL (ref 65–100)
GLUCOSE BLD STRIP.AUTO-MCNC: 290 MG/DL (ref 65–100)
GLUCOSE SERPL-MCNC: 239 MG/DL (ref 65–100)
GLUCOSE, GLC: 134 MG/DL
GLUCOSE, GLC: 147 MG/DL
GLUCOSE, GLC: 173 MG/DL
GLUCOSE, GLC: 180 MG/DL
GLUCOSE, GLC: 187 MG/DL
GLUCOSE, GLC: 191 MG/DL
GLUCOSE, GLC: 192 MG/DL
GLUCOSE, GLC: 194 MG/DL
GLUCOSE, GLC: 198 MG/DL
GLUCOSE, GLC: 204 MG/DL
GLUCOSE, GLC: 206 MG/DL
GLUCOSE, GLC: 215 MG/DL
GLUCOSE, GLC: 242 MG/DL
GLUCOSE, GLC: 255 MG/DL
GLUCOSE, GLC: 255 MG/DL
GLUCOSE, GLC: 256 MG/DL
GLUCOSE, GLC: 290 MG/DL
HAV IGM SER QL: NONREACTIVE
HBV CORE IGM SER QL: NONREACTIVE
HBV SURFACE AG SER QL: <0.1 INDEX
HBV SURFACE AG SER QL: NEGATIVE
HCT VFR BLD AUTO: 34 % (ref 36.6–50.3)
HCV AB SER IA-ACNC: 9.89 INDEX
HCV AB SERPL QL IA: REACTIVE
HCV COMMENT,HCGAC: ABNORMAL
HGB BLD-MCNC: 12.1 G/DL (ref 12.1–17)
HIGH TARGET, HITG: 250 MG/DL
IMM GRANULOCYTES # BLD AUTO: 0 K/UL (ref 0–0.04)
IMM GRANULOCYTES NFR BLD AUTO: 0 % (ref 0–0.5)
INR PPP: 1 (ref 0.9–1.1)
INSULIN ADMINSTERED, INSADM: 0.1 UNITS/HOUR
INSULIN ADMINSTERED, INSADM: 0.7 UNITS/HOUR
INSULIN ADMINSTERED, INSADM: 0.9 UNITS/HOUR
INSULIN ADMINSTERED, INSADM: 1.3 UNITS/HOUR
INSULIN ADMINSTERED, INSADM: 1.3 UNITS/HOUR
INSULIN ADMINSTERED, INSADM: 2.3 UNITS/HOUR
INSULIN ADMINSTERED, INSADM: 2.3 UNITS/HOUR
INSULIN ADMINSTERED, INSADM: 2.4 UNITS/HOUR
INSULIN ADMINSTERED, INSADM: 2.6 UNITS/HOUR
INSULIN ADMINSTERED, INSADM: 2.7 UNITS/HOUR
INSULIN ADMINSTERED, INSADM: 2.8 UNITS/HOUR
INSULIN ADMINSTERED, INSADM: 3.1 UNITS/HOUR
INSULIN ADMINSTERED, INSADM: 3.9 UNITS/HOUR
INSULIN ORDER, INSORD: 0.1 UNITS/HOUR
INSULIN ORDER, INSORD: 0.7 UNITS/HOUR
INSULIN ORDER, INSORD: 0.9 UNITS/HOUR
INSULIN ORDER, INSORD: 1.3 UNITS/HOUR
INSULIN ORDER, INSORD: 1.3 UNITS/HOUR
INSULIN ORDER, INSORD: 2.3 UNITS/HOUR
INSULIN ORDER, INSORD: 2.3 UNITS/HOUR
INSULIN ORDER, INSORD: 2.4 UNITS/HOUR
INSULIN ORDER, INSORD: 2.6 UNITS/HOUR
INSULIN ORDER, INSORD: 2.7 UNITS/HOUR
INSULIN ORDER, INSORD: 2.8 UNITS/HOUR
INSULIN ORDER, INSORD: 3.1 UNITS/HOUR
INSULIN ORDER, INSORD: 3.9 UNITS/HOUR
LOW TARGET, LOT: 150 MG/DL
LYMPHOCYTES # BLD: 1.8 K/UL (ref 0.8–3.5)
LYMPHOCYTES NFR BLD: 20 % (ref 12–49)
MAGNESIUM SERPL-MCNC: 2.2 MG/DL (ref 1.6–2.4)
MCH RBC QN AUTO: 32.6 PG (ref 26–34)
MCHC RBC AUTO-ENTMCNC: 35.6 G/DL (ref 30–36.5)
MCV RBC AUTO: 91.6 FL (ref 80–99)
MINUTES UNTIL NEXT BG, NBG: 120 MIN
MINUTES UNTIL NEXT BG, NBG: 60 MIN
MONOCYTES # BLD: 0.7 K/UL (ref 0–1)
MONOCYTES NFR BLD: 8 % (ref 5–13)
MULTIPLIER, MUL: 0
MULTIPLIER, MUL: 0.01
MULTIPLIER, MUL: 0.02
NEUTS SEG # BLD: 6.4 K/UL (ref 1.8–8)
NEUTS SEG NFR BLD: 71 % (ref 32–75)
NRBC # BLD: 0 K/UL (ref 0–0.01)
NRBC BLD-RTO: 0 PER 100 WBC
ORDER INITIALS, ORDINIT: NORMAL
PHOSPHATE SERPL-MCNC: 1.7 MG/DL (ref 2.6–4.7)
PLATELET # BLD AUTO: 263 K/UL (ref 150–400)
PMV BLD AUTO: 10.9 FL (ref 8.9–12.9)
POTASSIUM SERPL-SCNC: 3.2 MMOL/L (ref 3.5–5.1)
PROT SERPL-MCNC: 5.5 G/DL (ref 6.4–8.2)
PROTHROMBIN TIME: 10.3 SEC (ref 9–11.1)
RBC # BLD AUTO: 3.71 M/UL (ref 4.1–5.7)
SAMPLES BEING HELD,HOLD: NORMAL
SERVICE CMNT-IMP: ABNORMAL
SODIUM SERPL-SCNC: 140 MMOL/L (ref 136–145)
SP1: ABNORMAL
SP2: ABNORMAL
SP3: ABNORMAL
WBC # BLD AUTO: 9 K/UL (ref 4.1–11.1)

## 2019-04-16 PROCEDURE — 83735 ASSAY OF MAGNESIUM: CPT

## 2019-04-16 PROCEDURE — 74011636637 HC RX REV CODE- 636/637: Performed by: INTERNAL MEDICINE

## 2019-04-16 PROCEDURE — 80074 ACUTE HEPATITIS PANEL: CPT

## 2019-04-16 PROCEDURE — 76700 US EXAM ABDOM COMPLETE: CPT

## 2019-04-16 PROCEDURE — 85610 PROTHROMBIN TIME: CPT

## 2019-04-16 PROCEDURE — 82248 BILIRUBIN DIRECT: CPT

## 2019-04-16 PROCEDURE — 74011000258 HC RX REV CODE- 258: Performed by: INTERNAL MEDICINE

## 2019-04-16 PROCEDURE — 80053 COMPREHEN METABOLIC PANEL: CPT

## 2019-04-16 PROCEDURE — 65660000000 HC RM CCU STEPDOWN

## 2019-04-16 PROCEDURE — 74011250636 HC RX REV CODE- 250/636: Performed by: INTERNAL MEDICINE

## 2019-04-16 PROCEDURE — 74011000250 HC RX REV CODE- 250: Performed by: INTERNAL MEDICINE

## 2019-04-16 PROCEDURE — 74011250637 HC RX REV CODE- 250/637: Performed by: INTERNAL MEDICINE

## 2019-04-16 PROCEDURE — 36415 COLL VENOUS BLD VENIPUNCTURE: CPT

## 2019-04-16 PROCEDURE — C9113 INJ PANTOPRAZOLE SODIUM, VIA: HCPCS | Performed by: INTERNAL MEDICINE

## 2019-04-16 PROCEDURE — 84100 ASSAY OF PHOSPHORUS: CPT

## 2019-04-16 PROCEDURE — 82962 GLUCOSE BLOOD TEST: CPT

## 2019-04-16 PROCEDURE — 85025 COMPLETE CBC W/AUTO DIFF WBC: CPT

## 2019-04-16 RX ORDER — MAGNESIUM SULFATE 100 %
4 CRYSTALS MISCELLANEOUS AS NEEDED
Status: DISCONTINUED | OUTPATIENT
Start: 2019-04-16 | End: 2019-04-17 | Stop reason: HOSPADM

## 2019-04-16 RX ORDER — INSULIN LISPRO 100 [IU]/ML
INJECTION, SOLUTION INTRAVENOUS; SUBCUTANEOUS
Status: DISCONTINUED | OUTPATIENT
Start: 2019-04-16 | End: 2019-04-17 | Stop reason: HOSPADM

## 2019-04-16 RX ORDER — INSULIN LISPRO 100 [IU]/ML
INJECTION, SOLUTION INTRAVENOUS; SUBCUTANEOUS
Status: DISCONTINUED | OUTPATIENT
Start: 2019-04-16 | End: 2019-04-16

## 2019-04-16 RX ORDER — POTASSIUM CHLORIDE 750 MG/1
40 TABLET, FILM COATED, EXTENDED RELEASE ORAL ONCE
Status: COMPLETED | OUTPATIENT
Start: 2019-04-16 | End: 2019-04-16

## 2019-04-16 RX ORDER — INSULIN GLARGINE 100 [IU]/ML
20 INJECTION, SOLUTION SUBCUTANEOUS ONCE
Status: COMPLETED | OUTPATIENT
Start: 2019-04-16 | End: 2019-04-16

## 2019-04-16 RX ORDER — INSULIN GLARGINE 100 [IU]/ML
30 INJECTION, SOLUTION SUBCUTANEOUS DAILY
Status: DISCONTINUED | OUTPATIENT
Start: 2019-04-17 | End: 2019-04-17 | Stop reason: HOSPADM

## 2019-04-16 RX ORDER — DEXTROSE 50 % IN WATER (D50W) INTRAVENOUS SYRINGE
25-50 AS NEEDED
Status: DISCONTINUED | OUTPATIENT
Start: 2019-04-16 | End: 2019-04-17 | Stop reason: HOSPADM

## 2019-04-16 RX ORDER — DEXTROSE 50 % IN WATER (D50W) INTRAVENOUS SYRINGE
12.5-25 AS NEEDED
Status: DISCONTINUED | OUTPATIENT
Start: 2019-04-16 | End: 2019-04-16

## 2019-04-16 RX ADMIN — DEXTROSE MONOHYDRATE AND SODIUM CHLORIDE 100 ML/HR: 5; .9 INJECTION, SOLUTION INTRAVENOUS at 09:00

## 2019-04-16 RX ADMIN — SODIUM CHLORIDE 40 MG: 9 INJECTION, SOLUTION INTRAMUSCULAR; INTRAVENOUS; SUBCUTANEOUS at 08:50

## 2019-04-16 RX ADMIN — SODIUM CHLORIDE 40 MG: 9 INJECTION, SOLUTION INTRAMUSCULAR; INTRAVENOUS; SUBCUTANEOUS at 20:16

## 2019-04-16 RX ADMIN — OXYCODONE AND ACETAMINOPHEN 1 TABLET: 5; 325 TABLET ORAL at 14:08

## 2019-04-16 RX ADMIN — INSULIN LISPRO 2 UNITS: 100 INJECTION, SOLUTION INTRAVENOUS; SUBCUTANEOUS at 23:13

## 2019-04-16 RX ADMIN — OXYCODONE AND ACETAMINOPHEN 1 TABLET: 5; 325 TABLET ORAL at 08:50

## 2019-04-16 RX ADMIN — OXYCODONE AND ACETAMINOPHEN 1 TABLET: 5; 325 TABLET ORAL at 20:15

## 2019-04-16 RX ADMIN — DEXTROSE MONOHYDRATE AND SODIUM CHLORIDE 100 ML/HR: 5; .9 INJECTION, SOLUTION INTRAVENOUS at 18:38

## 2019-04-16 RX ADMIN — SODIUM CHLORIDE 2.3 UNITS/HR: 900 INJECTION, SOLUTION INTRAVENOUS at 04:08

## 2019-04-16 RX ADMIN — POTASSIUM CHLORIDE 40 MEQ: 750 TABLET, EXTENDED RELEASE ORAL at 12:22

## 2019-04-16 RX ADMIN — INSULIN GLARGINE 20 UNITS: 100 INJECTION, SOLUTION SUBCUTANEOUS at 18:44

## 2019-04-16 RX ADMIN — DEXTROSE MONOHYDRATE AND SODIUM CHLORIDE 100 ML/HR: 5; .9 INJECTION, SOLUTION INTRAVENOUS at 00:57

## 2019-04-16 NOTE — PROGRESS NOTES
Hospitalist Progress Note    NAME: Emil Palumbo   :  1980   MRN:  [id number]       Assessment / Plan:  Type 1 DM, uncontrolled in DKA:   -gap closed  -n/v try clear liquids, advance diet as tolerated  -start reduced dose lantus + SSI  -reports adherence w lantus regimen at home. A1C 13.1     Abdominal pain/Hematemesis  -vomiting earlier in admission, noted some ? blood mixed w vomitus  -appreciate GI consult  -nL H/H noted  -possible bleeding from MW tear or esophagitis  -no EGD for now (done w/in last 6mos)  -restart PPI, needs Rx at DX  -acute hepatitis panel non-reactive  -abd U/S no acute findings  -OP GES once acute issues resolved to exclude DM gastroparesis  -Rt great toe blister/wound, check Rt foot Xray, further w/u pending course    FELECIA   -resolved w IVF admin  -in setting of severe dehydration POA 2/2 DKA    Hypokalemia  -replete and monitor BMP    Lactic acidosis    -resolved    Hx Depression / Bipolar   Hx Seizures  H/O Hepatitis C:  LFT so far stable, monitor   Normal Weight: BMI 18.78  Code status: Full  Prophylaxis: SCD's  Recommended Disposition: Home w/Family      Subjective:     Chief Complaint / Reason for Physician Visit  Pt admits to feeling thirsty/hungry, denies N/V/abd pain. No other acute complaints. Discussed with RN events overnight. Review of Systems:  Symptom Y/N Comments  Symptom Y/N Comments   Fever/Chills n   Chest Pain n    Poor Appetite n   Edema     Cough    Abdominal Pain n    Sputum    Joint Pain     SOB/JOHNSTON n   Pruritis/Rash     Nausea/vomit n   Tolerating PT/OT     Diarrhea    Tolerating Diet     Constipation    Other       Could NOT obtain due to:      Objective:     VITALS:   Last 24hrs VS reviewed since prior progress note.  Most recent are:  Patient Vitals for the past 24 hrs:   Temp Pulse Resp BP SpO2   19 1101 98 °F (36.7 °C) 88 16 (!) 148/92 100 %   19 0850 98 °F (36.7 °C) 86 16 142/86 100 %   19 0719 97.5 °F (36.4 °C) 90 18 133/84 98 %   04/16/19 0305 98.3 °F (36.8 °C) 95 18 134/80 100 %   04/15/19 2259 98 °F (36.7 °C) 90 18 140/87 100 %   04/15/19 1906 98.3 °F (36.8 °C) 95 18 128/79 100 %   04/15/19 1736 98 °F (36.7 °C) 90 18 135/85 100 %   04/15/19 1500 97.8 °F (36.6 °C) 93 18 124/68 100 %       Intake/Output Summary (Last 24 hours) at 4/16/2019 1230  Last data filed at 4/16/2019 1101  Gross per 24 hour   Intake 2044.29 ml   Output 3800 ml   Net -1755.71 ml        PHYSICAL EXAM:  General: WD, WN. Alert, cooperative, no acute distress    EENT:  EOMI. Anicteric sclerae. MMM  Resp:  CTA bilaterally, no wheezing or rales. No accessory muscle use  CV:  Regular  rhythm,  No edema  GI:  Soft, Non distended, Non tender.  +Bowel sounds  Neurologic:  Alert and oriented X 3, normal speech,   Psych:   Good insight. Not anxious nor agitated  Skin:  No jaundice. Rt great toe ulcer    Reviewed most current lab test results and cultures  YES  Reviewed most current radiology test results   YES  Review and summation of old records today    NO  Reviewed patient's current orders and MAR    YES  PMH/SH reviewed - no change compared to H&P  ________________________________________________________________________  Care Plan discussed with:    Comments   Patient x    Family      RN x    Care Manager     Consultant                       x Multidiciplinary team rounds were held today with , nursing, pharmacist and clinical coordinator. Patient's plan of care was discussed; medications were reviewed and discharge planning was addressed.      ________________________________________________________________________  Total NON critical care TIME:  25  Minutes    Total CRITICAL CARE TIME Spent:   Minutes non procedure based      Comments   >50% of visit spent in counseling and coordination of care x    ________________________________________________________________________  Vandana Antunez DO     Procedures: see electronic medical records for all procedures/Xrays and details which were not copied into this note but were reviewed prior to creation of Plan. LABS:  I reviewed today's most current labs and imaging studies. Pertinent labs include:  Recent Labs     04/16/19  0203 04/14/19  1310   WBC 9.0 12.5*   HGB 12.1 17.9*   HCT 34.0* 51.2*    344     Recent Labs     04/16/19  0203 04/15/19  1822 04/15/19  1227  04/14/19  1807 04/14/19  1310    137 135*   < > 136 132*   K 3.2* 3.2* 3.8   < > 4.1 4.5    106 102   < > 101 90*   CO2 26 25 25   < > 8* 7*   * 209* 193*   < > 479* 629*   BUN 20 24* 33*   < > 40* 38*   CREA 0.89 1.00 1.22   < > 1.92* 2.09*   CA 7.8* 7.7* 8.5   < > 8.8 9.4   MG 2.2 1.9 2.0   < > 2.5* 2.6*   PHOS 1.7*  --   --   --  4.1  --    ALB 2.7*  --   --   --   --  4.3   TBILI 0.5  --   --   --   --  0.5   SGOT 15  --   --   --   --  16   ALT 38  --   --   --   --  81*   INR 1.0  --   --   --   --   --     < > = values in this interval not displayed.        Signed: Renée Saab, DO

## 2019-04-16 NOTE — PROGRESS NOTES
Gastroenterology Daily Progress Note   (Aubery Sandifer, PA-C covering for Dr. Adalberto Sandra)   [facility] [address] Marie Date: 4/14/2019       Subjective:    Patient resting comfortably in bed, asking for food. No further N/V. No abdominal pain at this time. No new complaints today. Current Facility-Administered Medications   Medication Dose Route Frequency    glucose chewable tablet 16 g  4 Tab Oral PRN    dextrose 5% and 0.9% NaCl infusion  100 mL/hr IntraVENous CONTINUOUS    insulin regular (NOVOLIN R, HUMULIN R) 100 Units in 0.9% sodium chloride 100 mL infusion  0-50 Units/hr IntraVENous TITRATE    insulin lispro (HUMALOG) injection   SubCUTAneous TIDAC    dextrose (D50W) injection syrg 12.5-25 g  25-50 mL IntraVENous PRN    glucagon (GLUCAGEN) injection 1 mg  1 mg IntraMUSCular PRN    aluminum-magnesium hydroxide (MAALOX) oral suspension 15 mL  15 mL Oral QID PRN    acetaminophen (TYLENOL) tablet 1,000 mg  1,000 mg Oral Q6H PRN    pantoprazole (PROTONIX) 40 mg in sodium chloride 0.9% 10 mL injection  40 mg IntraVENous Q12H    ondansetron (ZOFRAN) injection 4 mg  4 mg IntraVENous Q4H PRN    oxyCODONE-acetaminophen (PERCOCET) 5-325 mg per tablet 1 Tab  1 Tab Oral Q4H PRN        Objective:     Visit Vitals  /84 (BP 1 Location: Right arm, BP Patient Position: At rest)   Pulse 90   Temp 97.5 °F (36.4 °C)   Resp 18   Ht 5' 9\" (1.753 m)   Wt 58.7 kg (129 lb 6.6 oz)   SpO2 98%   BMI 19.11 kg/m²   Blood pressure 133/84, pulse 90, temperature 97.5 °F (36.4 °C), resp. rate 18, height 5' 9\" (1.753 m), weight 58.7 kg (129 lb 6.6 oz), SpO2 98 %.    04/16 0701 - 04/16 1900  In: 104.3 [I.V.:104.3]  Out: 500 [Urine:500]    04/14 1901 - 04/16 0700  In: 5966.8 [P.O.:3100;  I.V.:2866.8]  Out: 6050 [Urine:3900]      Intake/Output Summary (Last 24 hours) at 4/16/2019 1035  Last data filed at 4/16/2019 0800  Gross per 24 hour   Intake 2469.96 ml   Output 3800 ml   Net -1330.04 ml Physical Exam:       General: WM, NAD  Chest:  CTA, No rhonchi, rales or rubs.   Heart: S1, S2, RRR  GI: Soft, NT, ND + bowel sounds  Extremities: no edema or cyanosis  CNS: CN II-XII normal.      Labs:       Recent Results (from the past 24 hour(s))   GLUCOSE, POC    Collection Time: 04/15/19 10:44 AM   Result Value Ref Range    Glucose (POC) 141 (H) 65 - 100 mg/dL    Performed by Janice Boo    Collection Time: 04/15/19 10:45 AM   Result Value Ref Range    Glucose 141 mg/dL    Insulin order 0.0 units/hour    Insulin adminstered 0.0 units/hour    Multiplier 0.000     Low target 150 mg/dL    High target 250 mg/dL    D50 order 0.0 ml    D50 administered 0.00 ml    Minutes until next BG 60 min    Order initials bb     Administered initials bb     GLSCOM Comments     GLUCOSE, POC    Collection Time: 04/15/19 11:52 AM   Result Value Ref Range    Glucose (POC) 158 (H) 65 - 100 mg/dL    Performed by Janice Boo    Collection Time: 04/15/19 11:53 AM   Result Value Ref Range    Glucose 158 mg/dL    Insulin order 0.1 units/hour    Insulin adminstered 0.1 units/hour    Multiplier 0.000     Low target 150 mg/dL    High target 250 mg/dL    D50 order 0.0 ml    D50 administered 0.00 ml    Minutes until next BG 60 min    Order initials bb     Administered initials bb     GLSCOM Comments     METABOLIC PANEL, BASIC    Collection Time: 04/15/19 12:27 PM   Result Value Ref Range    Sodium 135 (L) 136 - 145 mmol/L    Potassium 3.8 3.5 - 5.1 mmol/L    Chloride 102 97 - 108 mmol/L    CO2 25 21 - 32 mmol/L    Anion gap 8 5 - 15 mmol/L    Glucose 193 (H) 65 - 100 mg/dL    BUN 33 (H) 6 - 20 MG/DL    Creatinine 1.22 0.70 - 1.30 MG/DL    BUN/Creatinine ratio 27 (H) 12 - 20      GFR est AA >60 >60 ml/min/1.73m2    GFR est non-AA >60 >60 ml/min/1.73m2    Calcium 8.5 8.5 - 10.1 MG/DL   MAGNESIUM    Collection Time: 04/15/19 12:27 PM   Result Value Ref Range    Magnesium 2.0 1.6 - 2.4 mg/dL GLUCOSE, POC    Collection Time: 04/15/19  1:07 PM   Result Value Ref Range    Glucose (POC) 176 (H) 65 - 100 mg/dL    Performed by Beatriz Clinton    Collection Time: 04/15/19  1:08 PM   Result Value Ref Range    Glucose 176 mg/dL    Insulin order 0.1 units/hour    Insulin adminstered 0.1 units/hour    Multiplier 0.000     Low target 150 mg/dL    High target 250 mg/dL    D50 order 0.0 ml    D50 administered 0.00 ml    Minutes until next BG 60 min    Order initials bb     Administered initials bb     GLSCOM Comments     GLUCOSE, POC    Collection Time: 04/15/19  2:47 PM   Result Value Ref Range    Glucose (POC) 226 (H) 65 - 100 mg/dL    Performed by Beatriz Clinton    Collection Time: 04/15/19  2:47 PM   Result Value Ref Range    Glucose 226 mg/dL    Insulin order 0.1 units/hour    Insulin adminstered 0.1 units/hour    Multiplier 0.000     Low target 150 mg/dL    High target 250 mg/dL    D50 order 0.0 ml    D50 administered 0.00 ml    Minutes until next BG 60 min    Order initials bb     Administered initials bb     GLSCOM Comments     GLUCOSE, POC    Collection Time: 04/15/19  3:37 PM   Result Value Ref Range    Glucose (POC) 289 (H) 65 - 100 mg/dL    Performed by Arlin Almeida    Collection Time: 04/15/19  3:37 PM   Result Value Ref Range    Glucose 289 mg/dL    Insulin order 2.3 units/hour    Insulin adminstered 2.3 units/hour    Multiplier 0.010     Low target 150 mg/dL    High target 250 mg/dL    D50 order 0.0 ml    D50 administered 0.00 ml    Minutes until next BG 60 min    Order initials bb     Administered initials bb     GLSCOM Comments     GLUCOSE, POC    Collection Time: 04/15/19  4:29 PM   Result Value Ref Range    Glucose (POC) 264 (H) 65 - 100 mg/dL    Performed by Abdias Lutz    GLUCOSTABILIZER    Collection Time: 04/15/19  4:31 PM   Result Value Ref Range    Glucose 264 mg/dL    Insulin order 4.1 units/hour    Insulin adminstered 4.1 units/hour    Multiplier 0.020     Low target 150 mg/dL    High target 250 mg/dL    D50 order 0.0 ml    D50 administered 0.00 ml    Minutes until next BG 60 min    Order initials bb     Administered initials bb     GLCOLLIN Comments     GLUCOSE, POC    Collection Time: 04/15/19  5:28 PM   Result Value Ref Range    Glucose (POC) 232 (H) 65 - 100 mg/dL    Performed by Cora David    Collection Time: 04/15/19  5:29 PM   Result Value Ref Range    Glucose 232 mg/dL    Insulin order 3.4 units/hour    Insulin adminstered 3.4 units/hour    Multiplier 0.020     Low target 150 mg/dL    High target 250 mg/dL    D50 order 0.0 ml    D50 administered 0.00 ml    Minutes until next BG 60 min    Order initials bb     Administered initials more     GLCOLLIN Comments     METABOLIC PANEL, BASIC    Collection Time: 04/15/19  6:22 PM   Result Value Ref Range    Sodium 137 136 - 145 mmol/L    Potassium 3.2 (L) 3.5 - 5.1 mmol/L    Chloride 106 97 - 108 mmol/L    CO2 25 21 - 32 mmol/L    Anion gap 6 5 - 15 mmol/L    Glucose 209 (H) 65 - 100 mg/dL    BUN 24 (H) 6 - 20 MG/DL    Creatinine 1.00 0.70 - 1.30 MG/DL    BUN/Creatinine ratio 24 (H) 12 - 20      GFR est AA >60 >60 ml/min/1.73m2    GFR est non-AA >60 >60 ml/min/1.73m2    Calcium 7.7 (L) 8.5 - 10.1 MG/DL   MAGNESIUM    Collection Time: 04/15/19  6:22 PM   Result Value Ref Range    Magnesium 1.9 1.6 - 2.4 mg/dL   HEMOGLOBIN A1C WITH EAG    Collection Time: 04/15/19  6:22 PM   Result Value Ref Range    Hemoglobin A1c 13.8 (H) 4.2 - 6.3 %    Est. average glucose 349 mg/dL   GLUCOSE, POC    Collection Time: 04/15/19  6:26 PM   Result Value Ref Range    Glucose (POC) 210 (H) 65 - 100 mg/dL    Performed by Cora David    Collection Time: 04/15/19  6:30 PM   Result Value Ref Range    Glucose 210 mg/dL    Insulin order 3.0 units/hour    Insulin adminstered 3.0 units/hour    Multiplier 0.020     Low target 150 mg/dL    High target 250 mg/dL    D50 order 0.0 ml    D50 administered 0.00 ml    Minutes until next BG 60 min    Order initials bj     Administered initials [facility]     GLSCOM Comments     GLUCOSE, POC    Collection Time: 04/15/19  7:33 PM   Result Value Ref Range    Glucose (POC) 164 (H) 65 - 100 mg/dL    Performed by Sandip Forrest    Collection Time: 04/15/19  7:34 PM   Result Value Ref Range    Glucose 164 mg/dL    Insulin order 2.1 units/hour    Insulin adminstered 2.1 units/hour    Multiplier 0.020     Low target 150 mg/dL    High target 250 mg/dL    D50 order 0.0 ml    D50 administered 0.00 ml    Minutes until next BG 60 min    Order initials ak     Administered initials ak     GLSCOM Comments     GLUCOSE, POC    Collection Time: 04/15/19  8:37 PM   Result Value Ref Range    Glucose (POC) 168 (H) 65 - 100 mg/dL    Performed by Sandip Forrest    Collection Time: 04/15/19  8:37 PM   Result Value Ref Range    Glucose 168 mg/dL    Insulin order 2.2 units/hour    Insulin adminstered 2.2 units/hour    Multiplier 0.020     Low target 150 mg/dL    High target 250 mg/dL    D50 order 0.0 ml    D50 administered 0.00 ml    Minutes until next BG 60 min    Order initials ak     Administered initials ak     GLSCOM Comments     GLUCOSE, POC    Collection Time: 04/15/19  9:41 PM   Result Value Ref Range    Glucose (POC) 131 (H) 65 - 100 mg/dL    Performed by Sandip Forrest    Collection Time: 04/15/19  9:41 PM   Result Value Ref Range    Glucose 131 mg/dL    Insulin order 0.7 units/hour    Insulin adminstered 0.7 units/hour    Multiplier 0.010     Low target 150 mg/dL    High target 250 mg/dL    D50 order 0.0 ml    D50 administered 0.00 ml    Minutes until next BG 60 min    Order initials ak     Administered initials ak     GLSCOM Comments     GLUCOSE, POC    Collection Time: 04/15/19 10:46 PM   Result Value Ref Range    Glucose (POC) 139 (H) 65 - 100 mg/dL    Performed by [employer] GLUCOSTABILIZER    Collection Time: 04/15/19 10:46 PM   Result Value Ref Range    Glucose 139 mg/dL    Insulin order 0.0 units/hour    Insulin adminstered 0.0 units/hour    Multiplier 0.000     Low target 150 mg/dL    High target 250 mg/dL    D50 order 0.0 ml    D50 administered 0.00 ml    Minutes until next BG 60 min    Order initials ak     Administered initials ak     GLSCOM Comments     GLUCOSE, POC    Collection Time: 04/15/19 11:51 PM   Result Value Ref Range    Glucose (POC) 178 (H) 65 - 100 mg/dL    Performed by Carly Lay    Collection Time: 04/15/19 11:51 PM   Result Value Ref Range    Glucose 178 mg/dL    Insulin order 0.1 units/hour    Insulin adminstered 0.1 units/hour    Multiplier 0.000     Low target 150 mg/dL    High target 250 mg/dL    D50 order 0.0 ml    D50 administered 0.00 ml    Minutes until next BG 60 min    Order initials ak     Administered initials ak     GLSCOM Comments     GLUCOSE, POC    Collection Time: 04/16/19 12:54 AM   Result Value Ref Range    Glucose (POC) 206 (H) 65 - 100 mg/dL    Performed by Christine Burgess (PCT)    GLUCOSTABILIZER    Collection Time: 04/16/19 12:55 AM   Result Value Ref Range    Glucose 206 mg/dL    Insulin order 0.1 units/hour    Insulin adminstered 0.1 units/hour    Multiplier 0.000     Low target 150 mg/dL    High target 250 mg/dL    D50 order 0.0 ml    D50 administered 0.00 ml    Minutes until next BG 60 min    Order initials pt     Administered initials pt     GLSCOM Comments     GLUCOSE, POC    Collection Time: 04/16/19  1:58 AM   Result Value Ref Range    Glucose (POC) 204 (H) 65 - 100 mg/dL    Performed by Carly Lay    Collection Time: 04/16/19  1:59 AM   Result Value Ref Range    Glucose 204 mg/dL    Insulin order 0.1 units/hour    Insulin adminstered 0.1 units/hour    Multiplier 0.000     Low target 150 mg/dL    High target 250 mg/dL    D50 order 0.0 ml    D50 administered 0.00 ml    Minutes until next BG 60 min    Order initials ak     Administered initials ak     GLSCOM Comments     CBC WITH AUTOMATED DIFF    Collection Time: 04/16/19  2:03 AM   Result Value Ref Range    WBC 9.0 4.1 - 11.1 K/uL    RBC 3.71 (L) 4.10 - 5.70 M/uL    HGB 12.1 12.1 - 17.0 g/dL    HCT 34.0 (L) 36.6 - 50.3 %    MCV 91.6 80.0 - 99.0 FL    MCH 32.6 26.0 - 34.0 PG    MCHC 35.6 30.0 - 36.5 g/dL    RDW 12.9 11.5 - 14.5 %    PLATELET 172 304 - 015 K/uL    MPV 10.9 8.9 - 12.9 FL    NRBC 0.0 0  WBC    ABSOLUTE NRBC 0.00 0.00 - 0.01 K/uL    NEUTROPHILS 71 32 - 75 %    LYMPHOCYTES 20 12 - 49 %    MONOCYTES 8 5 - 13 %    EOSINOPHILS 1 0 - 7 %    BASOPHILS 0 0 - 1 %    IMMATURE GRANULOCYTES 0 0.0 - 0.5 %    ABS. NEUTROPHILS 6.4 1.8 - 8.0 K/UL    ABS. LYMPHOCYTES 1.8 0.8 - 3.5 K/UL    ABS. MONOCYTES 0.7 0.0 - 1.0 K/UL    ABS. EOSINOPHILS 0.1 0.0 - 0.4 K/UL    ABS. BASOPHILS 0.0 0.0 - 0.1 K/UL    ABS. IMM. GRANS. 0.0 0.00 - 0.04 K/UL    DF AUTOMATED     MAGNESIUM    Collection Time: 04/16/19  2:03 AM   Result Value Ref Range    Magnesium 2.2 1.6 - 2.4 mg/dL   METABOLIC PANEL, COMPREHENSIVE    Collection Time: 04/16/19  2:03 AM   Result Value Ref Range    Sodium 140 136 - 145 mmol/L    Potassium 3.2 (L) 3.5 - 5.1 mmol/L    Chloride 106 97 - 108 mmol/L    CO2 26 21 - 32 mmol/L    Anion gap 8 5 - 15 mmol/L    Glucose 239 (H) 65 - 100 mg/dL    BUN 20 6 - 20 MG/DL    Creatinine 0.89 0.70 - 1.30 MG/DL    BUN/Creatinine ratio 22 (H) 12 - 20      GFR est AA >60 >60 ml/min/1.73m2    GFR est non-AA >60 >60 ml/min/1.73m2    Calcium 7.8 (L) 8.5 - 10.1 MG/DL    Bilirubin, total 0.5 0.2 - 1.0 MG/DL    ALT (SGPT) 38 12 - 78 U/L    AST (SGOT) 15 15 - 37 U/L    Alk.  phosphatase 126 (H) 45 - 117 U/L    Protein, total 5.5 (L) 6.4 - 8.2 g/dL    Albumin 2.7 (L) 3.5 - 5.0 g/dL    Globulin 2.8 2.0 - 4.0 g/dL    A-G Ratio 1.0 (L) 1.1 - 2.2     PROTHROMBIN TIME + INR    Collection Time: 04/16/19  2:03 AM   Result Value Ref Range    INR 1.0 0.9 - 1.1 Prothrombin time 10.3 9.0 - 11.1 sec   BILIRUBIN, DIRECT    Collection Time: 04/16/19  2:03 AM   Result Value Ref Range    Bilirubin, direct 0.1 0.0 - 0.2 MG/DL   SAMPLES BEING HELD    Collection Time: 04/16/19  2:03 AM   Result Value Ref Range    SAMPLES BEING HELD 1PST     COMMENT        Add-on orders for these samples will be processed based on acceptable specimen integrity and analyte stability, which may vary by analyte.    PHOSPHORUS    Collection Time: 04/16/19  2:03 AM   Result Value Ref Range    Phosphorus 1.7 (L) 2.6 - 4.7 MG/DL   GLUCOSE, POC    Collection Time: 04/16/19  3:02 AM   Result Value Ref Range    Glucose (POC) 242 (H) 65 - 100 mg/dL    Performed by Nubia Garcias    Collection Time: 04/16/19  3:03 AM   Result Value Ref Range    Glucose 242 mg/dL    Insulin order 0.1 units/hour    Insulin adminstered 0.1 units/hour    Multiplier 0.000     Low target 150 mg/dL    High target 250 mg/dL    D50 order 0.0 ml    D50 administered 0.00 ml    Minutes until next BG 60 min    Order initials ak     Administered initials ak     GLSCOM Comments     GLUCOSE, POC    Collection Time: 04/16/19  4:06 AM   Result Value Ref Range    Glucose (POC) 290 (H) 65 - 100 mg/dL    Performed by Nubia Garcias    Collection Time: 04/16/19  4:07 AM   Result Value Ref Range    Glucose 290 mg/dL    Insulin order 2.3 units/hour    Insulin adminstered 2.3 units/hour    Multiplier 0.010     Low target 150 mg/dL    High target 250 mg/dL    D50 order 0.0 ml    D50 administered 0.00 ml    Minutes until next BG 60 min    Order initials ak     Administered initials ak     GLSCOM Comments     GLUCOSE, POC    Collection Time: 04/16/19  5:09 AM   Result Value Ref Range    Glucose (POC) 255 (H) 65 - 100 mg/dL    Performed by [place]    Collection Time: 04/16/19  5:10 AM   Result Value Ref Range    Glucose 255 mg/dL    Insulin order 3.9 units/hour    Insulin adminstered 3.9 units/hour    Multiplier 0.020     Low target 150 mg/dL    High target 250 mg/dL    D50 order 0.0 ml    D50 administered 0.00 ml    Minutes until next BG 60 min    Order initials dct     Administered initials dct     GLSCOM Comments     GLUCOSE, POC    Collection Time: 04/16/19  6:05 AM   Result Value Ref Range    Glucose (POC) 215 (H) 65 - 100 mg/dL    Performed by Vicente Gil    Collection Time: 04/16/19  6:10 AM   Result Value Ref Range    Glucose 215 mg/dL    Insulin order 3.1 units/hour    Insulin adminstered 3.1 units/hour    Multiplier 0.020     Low target 150 mg/dL    High target 250 mg/dL    D50 order 0.0 ml    D50 administered 0.00 ml    Minutes until next BG 60 min    Order initials ak     Administered initials ak     GLSCOM Comments     GLUCOSE, POC    Collection Time: 04/16/19  7:15 AM   Result Value Ref Range    Glucose (POC) 173 (H) 65 - 100 mg/dL    Performed by Dia Bustos (PCT)    GLUCOSTABILIZER    Collection Time: 04/16/19  7:16 AM   Result Value Ref Range    Glucose 173 mg/dL    Insulin order 2.3 units/hour    Insulin adminstered 2.3 units/hour    Multiplier 0.020     Low target 150 mg/dL    High target 250 mg/dL    D50 order 0.0 ml    D50 administered 0.00 ml    Minutes until next BG 60 min    Order initials ak     Administered initials ak     GLSCOM Comments     GLUCOSE, POC    Collection Time: 04/16/19  8:18 AM   Result Value Ref Range    Glucose (POC) 198 (H) 65 - 100 mg/dL    Performed by Denis Herrera    Collection Time: 04/16/19  8:28 AM   Result Value Ref Range    Glucose 198 mg/dL    Insulin order 2.8 units/hour    Insulin adminstered 2.8 units/hour    Multiplier 0.020     Low target 150 mg/dL    High target 250 mg/dL    D50 order 0.0 ml    D50 administered 0.00 ml    Minutes until next BG 60 min    Order initials bb     Administered initials bb     GLSCOM Comments     GLUCOSE, POC    Collection Time: 04/16/19  9:31 AM   Result Value Ref Range    Glucose (POC) 180 (H) 65 - 100 mg/dL    Performed by Tammy Schultz    Collection Time: 04/16/19  9:31 AM   Result Value Ref Range    Glucose 180 mg/dL    Insulin order 2.4 units/hour    Insulin adminstered 2.4 units/hour    Multiplier 0.020     Low target 150 mg/dL    High target 250 mg/dL    D50 order 0.0 ml    D50 administered 0.00 ml    Minutes until next BG 60 min    Order initials bb     Administered initials bb     GLSCOM Comments     LABRCNT(wbc:2,hgb:2,hct:2,plt:2,)  Recent Labs     04/16/19  0203 04/15/19  1822 04/15/19  1227  04/14/19  1807    137 135*   < > 136   K 3.2* 3.2* 3.8   < > 4.1    106 102   < > 101   CO2 26 25 25   < > 8*   BUN 20 24* 33*   < > 40*   CREA 0.89 1.00 1.22   < > 1.92*   * 209* 193*   < > 479*   CA 7.8* 7.7* 8.5   < > 8.8   MG 2.2 1.9 2.0   < > 2.5*   PHOS 1.7*  --   --   --  4.1    < > = values in this interval not displayed. LABRCNT(sgot:3,gpt:3,ap:3,tbiL:3,TP:3,ALB:3,GLOB:3,ggt:3,aml:3,amyp:3,lpse:3,hlpse:3)  Recent Labs     04/16/19  0203   INR 1.0   PTP 10.3     Recent Labs     04/16/19  0203 04/15/19  0441 04/14/19  1310   SGOT 15  --  16   *  --  214*   TP 5.5*  --  8.6*   ALB 2.7*  --  4.3   GLOB 2.8  --  4.3*   LPSE  --  248  --    BRIEFLAB(B12,FOL,FOLAT,RBCF)No results found for: FOL, RBCFLABRCNT(CPK:3,CpKMB:3,ckndx:3,troiq:3)No components found for: GLPOCBRIEFLAB(CHOL,CHOLX,CHOLP,CHLST,CHOLV,HDL,HDLC,HDLP,LDL,DLDL,LDLC,DLDLP,TGL,TGLX,TRIGL,TRIGP,CHHD,CHHDX)No results for input(s): PH, PCO2, PO2 in the last 72 hours. LABRCNT(CPK:3,CpKMB:3,ckndx:3,troiq:3)MANUEL Sandhu  Recent Labs     04/14/19  1310   TROIQ <0.05   [place]     Abdominal US and hepatitis panel pending      Problem List:     Active Problems:    DKA, type 1, not at goal [facility]) (4/14/2019)      Nausea with vomiting (4/16/2019)      Impression:  DKA, uncontrolled type 1 DM  Nausea with vomiting/hematemesis  Hx of erosive esophagitis  Elevated LFTs       Plan:  Pt appears to be improving, no further vomiting at this time, hgb remains stable. Asking for food, cleared for PO intake from GI stand point, will defer to primary care team.  LFTs trending down today. Abdominal US and hepatitis panel pending. Stressed importance of PPI use after d/c with hx of erosive/ulcerative esophagitis as well as continued smoking cessation.         MANUEL Yañez    4/16/2019  10:35 AM   [id number] [facility], [address] [facility]  [phone] [id number]  [facility] [place] [address] [place]: [phone]

## 2019-04-16 NOTE — DIABETES MGMT
DTC note:  Called by CM to see pt. Pt now reporting he has no glucometer. Provided pt with a new glucometer. Denied need for instruction on using it. Pt seen in consultation yesterday. See note from KEKE Shearer RD for details.     Thank you,  BRET Marin, RN, [address]

## 2019-04-16 NOTE — PROGRESS NOTES
Problem: Falls - Risk of  Goal: *Absence of Falls  Description  Document Melissa Latin Fall Risk and appropriate interventions in the flowsheet.   Outcome: Progressing Towards Goal     Problem: Patient Education: Go to Patient Education Activity  Goal: Patient/Family Education  Outcome: Progressing Towards Goal     Problem: Patient Education: Go to Patient Education Activity  Goal: Patient/Family Education  Outcome: Progressing Towards Goal     Problem: DKA: Day 1  Goal: Off Pathway (Use only if patient is Off Pathway)  Outcome: Progressing Towards Goal  Goal: Activity/Safety  Outcome: Progressing Towards Goal  Goal: Consults, if ordered  Outcome: Progressing Towards Goal  Goal: Diagnostic Tests/Procedures, if Ordered  Outcome: Progressing Towards Goal  Goal: Nutrition/Diet  Outcome: Progressing Towards Goal  Goal: Discharge Planning  Outcome: Progressing Towards Goal  Goal: Medications  Outcome: Progressing Towards Goal  Goal: Respiratory  Outcome: Progressing Towards Goal  Goal: Treatments/Interventions/Procedures  Outcome: Progressing Towards Goal  Goal: Psychosocial  Outcome: Progressing Towards Goal  Goal: *Hemodynamically stable  Outcome: Progressing Towards Goal  Goal: *Blood glucose falling 50 to 100 mg/dl/hr  Outcome: Progressing Towards Goal  Goal: *Potassium normalizing  Outcome: Progressing Towards Goal     Problem: DKA: Day 2  Goal: Off Pathway (Use only if patient is Off Pathway)  Outcome: Progressing Towards Goal  Goal: Activity/Safety  Outcome: Progressing Towards Goal  Goal: Consults, if ordered  Outcome: Progressing Towards Goal  Goal: Diagnostic Test/Procedures  Outcome: Progressing Towards Goal  Goal: Nutrition/Diet  Outcome: Progressing Towards Goal  Goal: Discharge Planning  Outcome: Progressing Towards Goal  Goal: Medications  Outcome: Progressing Towards Goal  Goal: Respiratory  Outcome: Progressing Towards Goal  Goal: Treatments/Interventions/Procedures  Outcome: Progressing Towards Goal  Goal: Psychosocial  Outcome: Progressing Towards Goal  Goal: *Acidosis resolved  Outcome: Progressing Towards Goal  Goal: *Tolerating diet  Outcome: Progressing Towards Goal  Goal: *Demonstrates progressive activity  Outcome: Progressing Towards Goal  Goal: *Blood glucose 80 to 180 mg/dl  Outcome: Progressing Towards Goal     Problem: DKA: Day 3  Goal: Off Pathway (Use only if patient is Off Pathway)  Outcome: Progressing Towards Goal  Goal: Activity/Safety  Outcome: Progressing Towards Goal  Goal: Diagnostic Test/Procedures  Outcome: Progressing Towards Goal  Goal: Nutrition/Diet  Outcome: Progressing Towards Goal  Goal: Discharge Planning  Outcome: Progressing Towards Goal  Goal: Medications  Outcome: Progressing Towards Goal  Goal: Treatments/Interventions/Procedures  Outcome: Progressing Towards Goal  Goal: Psychosocial  Outcome: Progressing Towards Goal     Problem: DKA: Discharge Outcomes  Goal: *Ambulates and performs ADL's  Outcome: Progressing Towards Goal  Goal: *Describes follow-up/return visits to physicians, diabetes treatment coordinator and other resources  Outcome: Progressing Towards Goal  Goal: *Blood glucose at patient's target range  Outcome: Progressing Towards Goal  Goal: *Acidosis resolved  Outcome: Progressing Towards Goal  Goal: *Tolerating diet  Outcome: Progressing Towards Goal  Goal: *Verbalizes understanding and describes prescribed diet  Outcome: Progressing Towards Goal  Goal: *Describes blood glucose goals, monitoring, sick day rules, hypo/hyperglycemia  Outcome: Progressing Towards Goal  Goal: *Describes available resources and support systems  Outcome: Progressing Towards Goal  Goal: *Verbalizes name, dosage, time, side effects, and number of days to continue medications  Outcome: Progressing Towards Goal  Goal: *Demonstrates ability to self-administer insulin  Outcome: Progressing Towards Goal     Problem: Diabetes Self-Management  Goal: *Disease process and treatment process  Description  Define diabetes and identify own type of diabetes; list 3 options for treating diabetes. Outcome: Progressing Towards Goal  Goal: *Incorporating nutritional management into lifestyle  Description  Describe effect of type, amount and timing of food on blood glucose; list 3 methods for planning meals. Outcome: Progressing Towards Goal  Goal: *Incorporating physical activity into lifestyle  Description  State effect of exercise on blood glucose levels. Outcome: Progressing Towards Goal  Goal: *Developing strategies to promote health/change behavior  Description  Define the ABC's of diabetes; identify appropriate screenings, schedule and personal plan for screenings. Outcome: Progressing Towards Goal  Goal: *Using medications safely  Description  State effect of diabetes medications on diabetes; name diabetes medication taking, action and side effects. Outcome: Progressing Towards Goal  Goal: *Monitoring blood glucose, interpreting and using results  Description  Identify recommended blood glucose targets  and personal targets. Outcome: Progressing Towards Goal  Goal: *Prevention, detection, treatment of acute complications  Description  List symptoms of hyper- and hypoglycemia; describe how to treat low blood sugar and actions for lowering  high blood glucose level. Outcome: Progressing Towards Goal  Goal: *Prevention, detection and treatment of chronic complications  Description  Define the natural course of diabetes and describe the relationship of blood glucose levels to long term complications of diabetes.   Outcome: Progressing Towards Goal  Goal: *Developing strategies to address psychosocial issues  Description  Describe feelings about living with diabetes; identify support needed and support network  Outcome: Progressing Towards Goal  Goal: *Insulin pump training  Outcome: Progressing Towards Goal  Goal: *Sick day guidelines  Outcome: Progressing Towards Goal  Goal: *Patient Specific Goal (EDIT GOAL, INSERT TEXT)  Outcome: Progressing Towards Goal     Problem: Patient Education: Go to Patient Education Activity  Goal: Patient/Family Education  Outcome: Progressing Towards Goal

## 2019-04-16 NOTE — PROGRESS NOTES
PCU SHIFT NURSING NOTE      Bedside and Verbal shift change report given to Hubert Jones RN (oncoming nurse) by Earnestine Dakin, RN (offgoing nurse). Report included the following information SBAR, Kardex, ED Summary, Procedure Summary, Intake/Output, MAR, Recent Results, Med Rec Status, Cardiac Rhythm NSR, Alarm Parameters  and Quality Measures. Shift Summary:   0718--Patients insulin gtt changed to 2.3 Units/hr with a blood glucose of 173. Will monitor and assess. Patient has ultrasound of abdomen this morning and is NPO for procedure. Will take down when US sends for him since he is on insulin gtt. 0828--Patients insulin gtt changed to 2.8 Units/hr with a blood glucose of 198. Will monitor and assess as needed. Still waiting on US so patient can eat and drink this morning to try and get him off of insulin gtt. Nausea today with no vomiting yet. 0850--Patients pain level is 8 out of 10. Percocet 5mg given for pain. IV protonix given as prescribed 40 mg.  0900--Hung another bag of D5 with NS running at 100 cc/hr. 1023--Patient transported to Ultrasound by nurse with insulin gtt. Ultrasound has room open and waiting for patient to arrive. Will wait for patient while the ultrasound is completed. 1045--Patient is back in room 2243 and the ultrasound is complete. 1101--Reassessment complete. 1110--Paged Dr. Walt Wharton to get diet order for patient now that his Ultrasound is completed. Patient has been NPO since midnight and is asking for food and drink. Will wait for return call. 1118--Dr. Galdamez List returned page. Full liquid diet ordered for patient to see if he can tolerate the diet. If patient can tolerate full liquid diet then call Dr. Walt Wharton back and will advance diet and take patient off of insulin gtt.     1259--Insulin gtt changed to 0.7 Units/hr for a blood glucose level of 134.  1404--Insulin gtt changed to 3.9 Units/hr for a blood glucose level of 256.  1408--Patient given pain medication for a pain level of 8 out of 10. Patient given Oxycodone 5 mg tablet (1). 1515--Insulin gtt changed to 2.6 Units/hr for a blood glucose level of 192. New diet order changed to Diabetic Diet with options to make sure patient can tolerate without having nausea and vomiting. If patient is able to tolerate diet, then he will be given lantus and transitioned off of insulin drip this evening per orders from Dr. La Barton. 1620--Insulin gtt changed to 0.9 Units/hr for a blood glucose level of 147. Will continue to monitor and assess. 1715--Paged Dr. La Barton to get lantus order to transition patient off of insulin drip. Will wait for return call. 1717--Dr. JO ANN Deleon to put in orders to transition patient off of insulin gtt. Orders for sliding scale and lantus will be put in by Dr. La Barton. 1725--Insulin gtt changed to 1.3 Units/hr for a blood glucose level of 187.  1838--Insulin gtt stayed the same at 1.3 Units/hr with a blood glucose of 183.  1838--New bag of D5 with NS started at 100 cc/hr. 1844--Gave patient 20 Units of Lantus as ordered by Dr. Raven Donnelly. Doctor wants to run the insulin gtt 2 more hours and then turn it off. Admission Date 4/14/2019   Admission Diagnosis DKA, type 1, not at goal ([address] [E10.10]   Consults IP CONSULT TO HOSPITALIST  IP CONSULT TO GASTROENTEROLOGY        Consults   [x]PT   [x]OT   []Speech   [x]Case Management      [] Palliative      Cardiac Monitoring Order   [x]Yes   []No     IV drips   [x]Yes    Drip:  Insulin gtt           Dose: titration  Drip:                            Dose:  Drip:                            Dose:   []No     GI Prophylaxis   [x]Yes   []No         DVT Prophylaxis   SCDs:             Parminder stockings:         [x] Medication   []Contraindicated   []None      Activity Level Activity Level: Up with Assistance     Activity Assistance: Partial (one person)   Purposeful Rounding every 1-2 hour?    [x]Yes   Holt Area Score Total Score: 2   Bed Alarm (If score 3 or >)   [x]Yes   [] Refused (See signed refusal form in chart)   Corey Score  Corey Score: 19   Corey Score (if score 14 or less)   []PMT consult   []Wound Care consult      []Specialty bed   [x] Nutrition consult          Needs prior to discharge:   Home O2 required:    []Yes   [x]No    If yes, how much O2 required? Other:    Last Bowel Movement: Last Bowel Movement Date: 04/13/19      Influenza Vaccine Received Flu Vaccine for Current Season (usually Sept-March): No    Patient/Guardian Refused (Notify MD): Yes   Pneumonia Vaccine           Diet Active Orders   Diet    DIET NPO With Ice Chips      LDAs               Peripheral IV 04/14/19 Left Antecubital (Active)   Site Assessment Clean, dry, & intact 4/16/2019  7:19 AM   Phlebitis Assessment 0 4/16/2019  7:19 AM   Infiltration Assessment 0 4/16/2019  7:19 AM   Dressing Status Clean, dry, & intact 4/16/2019  7:19 AM   Dressing Type Tape;Transparent 4/16/2019  7:19 AM   Hub Color/Line Status Pink; Infusing;Flushed 4/16/2019  7:19 AM   Action Taken Open ports on tubing capped 4/16/2019  7:19 AM   Alcohol Cap Used Yes 4/16/2019  7:19 AM       Peripheral IV 04/14/19 Right Hand (Active)   Site Assessment Clean, dry, & intact 4/16/2019  7:19 AM   Phlebitis Assessment 0 4/16/2019  7:19 AM   Infiltration Assessment 0 4/16/2019  7:19 AM   Dressing Status Clean, dry, & intact 4/16/2019  7:19 AM   Dressing Type Tape;Transparent 4/16/2019  7:19 AM   Hub Color/Line Status Pink;Capped;Flushed 4/16/2019  7:19 AM   Action Taken Open ports on tubing capped 4/16/2019  7:19 AM   Alcohol Cap Used Yes 4/16/2019  7:19 AM                      Urinary Catheter      Intake & Output Date 04/15/19 0700 - 04/16/19 0659 04/16/19 0700 - 04/17/19 0659   Shift 0087-8305 1509-9223 24 Hour Total 7458-0505 4590-6094 24 Hour Total   INTAKE   P.O. 1100  1100        P. O. 1100  1100      I. V.(mL/kg/hr) 902.8(1.3) 1230.1(1.7) 2132.9(1.5) 104.3  104.3 Insulin Volume 11.1 16.7 27.9 2.6  2.6     Volume (dextrose 5% - 0.45% NaCl with KCl 40 mEq/L infusion) 501.7  501.7        Volume (dextrose 5% and 0.9% NaCl infusion) 390 1213.3 1603.3 101.7  101.7   Shift Total(mL/kg) 2002. 8(34.7) 1230.1(21) 3232. 9(55.1) 104.3(1.8)  104.3(1.8)   OUTPUT   Urine(mL/kg/hr) 2650(3.8) 450(0.6) 3100(2.2) 500  500     Urine Voided 2650 450 3100 500  500   Emesis/NG output 1450  1450        Emesis 1450  1450      Shift Total(mL/kg) 4100(71.1) 450(7.7) 4550(77.5) 500(8.5)  500(8.5)   NET -2097.2 780.1 -1317.1 -395.7  -395.7   Weight (kg) 57.7 58.7 58.7 58.7 58.7 58.7         Readmission Risk Assessment Tool Score Low Risk            11       Total Score        3 Has Seen PCP in Last 6 Months (Yes=3, No=0)    4 IP Visits Last 12 Months (1-3=4, 4=9, >4=11)    4 Pt. Coverage (Medicare=5 , Medicaid, or Self-Pay=4)        Criteria that do not apply:    . Living with Significant Other. Assisted Living. LTAC. SNF.  or   Rehab    Patient Length of Stay (>5 days = 3)    Charlson Comorbidity Score (Age + Comorbid Conditions)       Expected Length of Stay 2d 21h   Actual Length of Stay 2

## 2019-04-16 NOTE — PROGRESS NOTES
Reason for Admission:   DKA, type 1                   RRAT Score:     11 - low risk                 Plan for utilizing home health:    No, hospital to home visit                      Current Advanced Directive/Advance Care Plan:Full Code/ not on file     Likelihood of Readmission:  Moderate risk                          Transition of Care Plan:    Home with f/u appts and hospital to home visit. Pt is a 41 y.o  male admitted with DKA, Type 1. Pt was alert, oriented resting in bed. Demographic information verified and all is correct. Pt requested this CM add a name to the emergency contact list. Pt lives alone in a 1 story home with 1 step to the entrance. Prior to admission, pt was independent with his ADL's and IADL's and driving. Pt stated he was suppose to start a new job today, [employer]. Pt will have insurance with the job but doesn't have it now. Pt is listed as self pay and Medicaid pending. Eloisa Burnett with [facility] met with pt and is screening him for [facility] care card and Medicaid. Pt had a glucometer at home but can't find it and hasn't used it in awhile. Pt stated he has been a diabetic for 16 yrs. CM notified the diabetes educator and she will give pt a new glucometer. Pt doesn't have a PCP and this CM discussed BS PCP and he was in agreement. Pt preferred the [facility] location. CM informed the cm specialist to set pt up with a new PCP. Denies having home health and rehab in the past. Preferred pharmacy is [address] [place] [address] in [place]. Pt's mother will transport pt home by car at discharge. Care Management Interventions  PCP Verified by CM: Yes(doesn't have a PCP)  Mode of Transport at Discharge:  Other (see comment)(pt's mother will transport by car)  Transition of Care Consult (CM Consult): Discharge Planning  Discharge Durable Medical Equipment: No(glucometer - but can't find it)  Physical Therapy Consult: No  Occupational Therapy Consult: No  Speech Therapy Consult: No  Current Support Network: Own Home, Lives Alone(lives alone in a 1 story home with 1 step to the entrance)  Confirm Follow Up Transport: Family  Discharge Location  Discharge Placement: [facility] [address], [address]

## 2019-04-16 NOTE — PROGRESS NOTES
PCU SHIFT NURSING NOTE    1900: The bedside shift change report given to Khoa  (oncoming nurse) by Marky Casiano  (offgoing nurse). Report included the following information SBAR, Kardex, Intake/Output, MAR, Recent Results, Med Rec Status, Cardiac Rhythm NSR/Sinus Tach and Alarm Parameters . 1030PM: Reported epigastric pain possibly because of acid reflux. Paged MD for order for antacacid. Order for Aluminum Magnesium hydroxide oral suspension and administered as per orders. The patient reported relief after med administration. 11:00 PM  The patient resting in bed with eyes open. Reported pain in legs and nausea. Administered percocert and Zofran as per PRN orders. 1:45 AM  The patient is NPO from midnight. All drinks in his table taken off. The patient is due for ABD ultrasound in the morning      4:00 AM  Morning lab drawn. No critical values noted. Admission Date 4/14/2019   Admission Diagnosis DKA, type 1, not at goal ([facility] [address] [facility] [E10.10]   Consults IP CONSULT TO HOSPITALIST  IP CONSULT TO GASTROENTEROLOGY        Consults   []PT   []OT   []Speech   []Case Management      [] Palliative      Cardiac Monitoring Order   []Yes   []No     IV drips   []Yes    Drip:                            Dose:  Drip:                            Dose:  Drip:                            Dose:   []No     GI Prophylaxis   []Yes   []No         DVT Prophylaxis   SCDs:             Parminder stockings:         [] Medication   []Contraindicated   []None      Activity Level Activity Level: Up with Assistance     Activity Assistance: Partial (one person)   Purposeful Rounding every 1-2 hour?    []Yes   Ness Score  Total Score: 3   Bed Alarm (If score 3 or >)   []Yes   [] Refused (See signed refusal form in chart)   Corey Score  Corey Score: 20   Corey Score (if score 14 or less)   []PMT consult   []Wound Care consult      []Specialty bed   [] Nutrition consult          Needs prior to discharge:   Home O2 required:    []Yes []No    If yes, how much O2 required? Other:    Last Bowel Movement: Last Bowel Movement Date: 04/13/19      Influenza Vaccine Received Flu Vaccine for Current Season (usually Sept-March): No    Patient/Guardian Refused (Notify MD): Yes   Pneumonia Vaccine           Diet Active Orders   Diet    DIET DIABETIC CLEAR LIQUID    DIET NPO With Ice Chips      LDAs               Peripheral IV 04/14/19 Left Antecubital (Active)   Site Assessment Clean, dry, & intact 4/15/2019  7:06 PM   Phlebitis Assessment 0 4/15/2019  7:06 PM   Infiltration Assessment 0 4/15/2019  7:06 PM   Dressing Status Clean, dry, & intact 4/15/2019  7:06 PM   Dressing Type Tape;Transparent 4/15/2019  7:06 PM   Hub Color/Line Status Pink;End cap changed 4/15/2019  7:06 PM   Action Taken Open ports on tubing capped 4/15/2019  7:06 PM   Alcohol Cap Used Yes 4/15/2019  7:06 PM       Peripheral IV 04/14/19 Right Hand (Active)   Site Assessment Clean, dry, & intact 4/15/2019  7:06 PM   Phlebitis Assessment 0 4/15/2019  7:06 PM   Infiltration Assessment 0 4/15/2019  7:06 PM   Dressing Status Clean, dry, & intact 4/15/2019  7:06 PM   Dressing Type Tape;Transparent 4/15/2019  7:06 PM   Hub Color/Line Status Pink;End cap changed 4/15/2019  7:06 PM   Action Taken Open ports on tubing capped 4/15/2019  7:06 PM   Alcohol Cap Used Yes 4/15/2019  7:06 PM                      Urinary Catheter      Intake & Output Date 04/14/19 1900 - 04/15/19 0659 04/15/19 0700 - 04/16/19 0659   Shift 7009-1867 24 Hour Total 9721-0177 1971-6533 24 Hour Total   INTAKE   P.O. 2000 2000 1100  1100     P. O. 2000 2000 1100  1100   I. V.(mL/kg/hr) 733.9 905.9 902.8(1.3)  902.8     Insulin Volume 100.6 132.6 11.1  11.1     Volume (0.9% sodium chloride infusion)  140        Volume (dextrose 5% - 0.45% NaCl with KCl 40 mEq/L infusion) 633.3 633.3 501.7  501.7     Volume (dextrose 5% and 0.9% NaCl infusion)   390  390   Shift Total(mL/kg) 2733. 9(49.9) 1284.9(30) 2002. 8(34.7)  2002. 8(34.7) OUTPUT   Urine(mL/kg/hr) 800 1700 2650(3.8)  2650     Urine Voided 800 1700 2650  2650   Emesis/NG output   1450     Emesis   1450   Shift Total(mL/kg) 1500(27.4) 2400(43.8) 4100(71.1)  4100(71.1)   NET 1233.9 505.9 -2097.2  -2097.2   Weight (kg) 54.8 54.8 57.7 57.7 57.7         Readmission Risk Assessment Tool Score Low Risk            11       Total Score        3 Has Seen PCP in Last 6 Months (Yes=3, No=0)    4 IP Visits Last 12 Months (1-3=4, 4=9, >4=11)    4 Pt. Coverage (Medicare=5 , Medicaid, or Self-Pay=4)        Criteria that do not apply:    . Living with Significant Other. Assisted Living. LTAC. SNF.  or   Rehab    Patient Length of Stay (>5 days = 3)    Charlson Comorbidity Score (Age + Comorbid Conditions)       Expected Length of Stay 2d 21h   Actual Length of Stay 1

## 2019-04-16 NOTE — DIABETES MGMT
DTC Consult Note      Recommendations/ Comments: Please consider transitioning to home insulin regimen, however, would recommend using humalog for prandial and correctional insulin while in the hospital.  lantus should be given 2 hrs prior to stopping the insulin gtt and would stop D5 IVF when gtt stopped. Current hospital DM medication: insulin gtt    Chart reviewed and initial evaluation complete on Zoran Silvestre. Patient is a 41 y.o. male with known hx of Type 1 on Lantus 46 units morning or night, Novolin R 2-10 units sliding scale at home. A1c:   Lab Results   Component Value Date/Time    Hemoglobin A1c 13.8 (H) 04/15/2019 06:22 PM       Recent Glucose Results:   Lab Results   Component Value Date/Time     (H) 04/16/2019 02:03 AM     (H) 04/15/2019 06:22 PM    GLUCPOC 134 (H) 04/16/2019 12:53 PM    GLUCPOC 159 (H) 04/16/2019 11:04 AM    GLUCPOC 194 (H) 04/16/2019 10:41 AM        Lab Results   Component Value Date/Time    Creatinine 0.89 04/16/2019 02:03 AM     Estimated Creatinine Clearance: 95.3 mL/min (based on SCr of 0.89 mg/dL). Active Orders   Diet    DIET FULL LIQUID        PO intake: No data found. Will continue to follow as needed. Thank you.   ELIZABETH KurtzN, RN, CDE  [facility]    Office; [phone]    Time spent: 5 minutes

## 2019-04-17 ENCOUNTER — APPOINTMENT (OUTPATIENT)
Dept: GENERAL RADIOLOGY | Age: 39
DRG: 638 | End: 2019-04-17
Attending: INTERNAL MEDICINE
Payer: SELF-PAY

## 2019-04-17 ENCOUNTER — HOME HEALTH ADMISSION (OUTPATIENT)
Dept: HOME HEALTH SERVICES | Facility: HOME HEALTH | Age: 39
End: 2019-04-17

## 2019-04-17 VITALS
RESPIRATION RATE: 14 BRPM | HEIGHT: 69 IN | BODY MASS INDEX: 19.04 KG/M2 | DIASTOLIC BLOOD PRESSURE: 95 MMHG | SYSTOLIC BLOOD PRESSURE: 145 MMHG | HEART RATE: 80 BPM | OXYGEN SATURATION: 98 % | WEIGHT: 128.53 LBS | TEMPERATURE: 98.1 F

## 2019-04-17 LAB
ANION GAP SERPL CALC-SCNC: 3 MMOL/L (ref 5–15)
BASOPHILS # BLD: 0 K/UL (ref 0–0.1)
BASOPHILS NFR BLD: 0 % (ref 0–1)
BUN SERPL-MCNC: 10 MG/DL (ref 6–20)
BUN/CREAT SERPL: 20 (ref 12–20)
CALCIUM SERPL-MCNC: 8.2 MG/DL (ref 8.5–10.1)
CHLORIDE SERPL-SCNC: 105 MMOL/L (ref 97–108)
CO2 SERPL-SCNC: 29 MMOL/L (ref 21–32)
CREAT SERPL-MCNC: 0.5 MG/DL (ref 0.7–1.3)
DIFFERENTIAL METHOD BLD: ABNORMAL
EOSINOPHIL # BLD: 0.1 K/UL (ref 0–0.4)
EOSINOPHIL NFR BLD: 2 % (ref 0–7)
ERYTHROCYTE [DISTWIDTH] IN BLOOD BY AUTOMATED COUNT: 12.4 % (ref 11.5–14.5)
GLUCOSE BLD STRIP.AUTO-MCNC: 154 MG/DL (ref 65–100)
GLUCOSE BLD STRIP.AUTO-MCNC: 209 MG/DL (ref 65–100)
GLUCOSE SERPL-MCNC: 190 MG/DL (ref 65–100)
HCT VFR BLD AUTO: 32.8 % (ref 36.6–50.3)
HGB BLD-MCNC: 11.7 G/DL (ref 12.1–17)
IMM GRANULOCYTES # BLD AUTO: 0 K/UL (ref 0–0.04)
IMM GRANULOCYTES NFR BLD AUTO: 0 % (ref 0–0.5)
LYMPHOCYTES # BLD: 2.5 K/UL (ref 0.8–3.5)
LYMPHOCYTES NFR BLD: 48 % (ref 12–49)
MCH RBC QN AUTO: 32.1 PG (ref 26–34)
MCHC RBC AUTO-ENTMCNC: 35.7 G/DL (ref 30–36.5)
MCV RBC AUTO: 90.1 FL (ref 80–99)
MONOCYTES # BLD: 0.4 K/UL (ref 0–1)
MONOCYTES NFR BLD: 8 % (ref 5–13)
NEUTS SEG # BLD: 2.3 K/UL (ref 1.8–8)
NEUTS SEG NFR BLD: 42 % (ref 32–75)
NRBC # BLD: 0 K/UL (ref 0–0.01)
NRBC BLD-RTO: 0 PER 100 WBC
PLATELET # BLD AUTO: 225 K/UL (ref 150–400)
PMV BLD AUTO: 10.1 FL (ref 8.9–12.9)
POTASSIUM SERPL-SCNC: 3.2 MMOL/L (ref 3.5–5.1)
RBC # BLD AUTO: 3.64 M/UL (ref 4.1–5.7)
SERVICE CMNT-IMP: ABNORMAL
SERVICE CMNT-IMP: ABNORMAL
SODIUM SERPL-SCNC: 137 MMOL/L (ref 136–145)
WBC # BLD AUTO: 5.4 K/UL (ref 4.1–11.1)

## 2019-04-17 PROCEDURE — 85025 COMPLETE CBC W/AUTO DIFF WBC: CPT

## 2019-04-17 PROCEDURE — 80048 BASIC METABOLIC PNL TOTAL CA: CPT

## 2019-04-17 PROCEDURE — 74011250637 HC RX REV CODE- 250/637: Performed by: INTERNAL MEDICINE

## 2019-04-17 PROCEDURE — 73630 X-RAY EXAM OF FOOT: CPT

## 2019-04-17 PROCEDURE — 82962 GLUCOSE BLOOD TEST: CPT

## 2019-04-17 PROCEDURE — C9113 INJ PANTOPRAZOLE SODIUM, VIA: HCPCS | Performed by: INTERNAL MEDICINE

## 2019-04-17 PROCEDURE — 36415 COLL VENOUS BLD VENIPUNCTURE: CPT

## 2019-04-17 PROCEDURE — 74011250636 HC RX REV CODE- 250/636: Performed by: INTERNAL MEDICINE

## 2019-04-17 PROCEDURE — 74011636637 HC RX REV CODE- 636/637: Performed by: INTERNAL MEDICINE

## 2019-04-17 PROCEDURE — 87522 HEPATITIS C REVRS TRNSCRPJ: CPT

## 2019-04-17 RX ORDER — POTASSIUM CHLORIDE 750 MG/1
40 TABLET, FILM COATED, EXTENDED RELEASE ORAL ONCE
Status: COMPLETED | OUTPATIENT
Start: 2019-04-17 | End: 2019-04-17

## 2019-04-17 RX ORDER — PANTOPRAZOLE SODIUM 40 MG/1
40 TABLET, DELAYED RELEASE ORAL DAILY
Qty: 30 TAB | Refills: 5 | Status: SHIPPED | OUTPATIENT
Start: 2019-04-17 | End: 2019-10-29

## 2019-04-17 RX ADMIN — OXYCODONE AND ACETAMINOPHEN 1 TABLET: 5; 325 TABLET ORAL at 04:31

## 2019-04-17 RX ADMIN — SODIUM CHLORIDE 40 MG: 9 INJECTION, SOLUTION INTRAMUSCULAR; INTRAVENOUS; SUBCUTANEOUS at 08:21

## 2019-04-17 RX ADMIN — INSULIN LISPRO 3 UNITS: 100 INJECTION, SOLUTION INTRAVENOUS; SUBCUTANEOUS at 08:20

## 2019-04-17 RX ADMIN — OXYCODONE AND ACETAMINOPHEN 1 TABLET: 5; 325 TABLET ORAL at 12:30

## 2019-04-17 RX ADMIN — OXYCODONE AND ACETAMINOPHEN 1 TABLET: 5; 325 TABLET ORAL at 08:26

## 2019-04-17 RX ADMIN — INSULIN GLARGINE 30 UNITS: 100 INJECTION, SOLUTION SUBCUTANEOUS at 08:19

## 2019-04-17 RX ADMIN — OXYCODONE AND ACETAMINOPHEN 1 TABLET: 5; 325 TABLET ORAL at 00:24

## 2019-04-17 RX ADMIN — POTASSIUM CHLORIDE 40 MEQ: 750 TABLET, EXTENDED RELEASE ORAL at 12:30

## 2019-04-17 NOTE — PROGRESS NOTES
Telehospitaistr: Anion gap closed. Stop IVF and IV insulin in favour of SQ insulin.  Have discussed with RN on the phoen

## 2019-04-17 NOTE — PROGRESS NOTES
CM met with pt and discussed hospital to home visit and pt was in agreement. Referral sent to [facility] via [facility] for hospital to home visit. Pt given a glucometer from the diabetes educator. Pt set up with a new PCP and appt was placed on the discharge paperwork. Pt's mother will transport pt home by car. Care Management Interventions  PCP Verified by CM: Yes(doesn't have a PCP)  Mode of Transport at Discharge:  Other (see comment)(pt's mother will transport pt home by car)  Transition of Care Consult (CM Consult): Discharge Planning(hospital to home visit )  Discharge Durable Medical Equipment: No  Physical Therapy Consult: No  Occupational Therapy Consult: No  Speech Therapy Consult: No  Current Support Network: Own Home, Lives Alone(lives alone in a 1 story home with 1 step to the entrance)  Confirm Follow Up Transport: Family  Plan discussed with Pt/Family/Caregiver: Yes  Discharge Location  Discharge Placement: [address], [address]

## 2019-04-17 NOTE — PROGRESS NOTES
Spiritual Care Partner Volunteer visited patient in PCU on April 16, 2019.     Documented on April 17, 2019 by:    NAKITA Craven, [facility], Chaplain KENDRICK ARAMBULA [facility] Paging Service  [phone])

## 2019-04-17 NOTE — DISCHARGE SUMMARY
Hospitalist Discharge Summary     Patient ID:  Cierra Ravi  [id number]  41 y.o.  1980    PCP on record: None    Admit date: 4/14/2019  Discharge date and time: 4/17/2019      DISCHARGE DIAGNOSIS:    See below      CONSULTATIONS:  IP CONSULT TO HOSPITALIST  IP CONSULT TO GASTROENTEROLOGY    Excerpted HPI from H&P of Jayden Molina MD:  Cedrick Omalley is a 41 y.o.  male with Type 1 DM who presents with progressive weakness and vomiting. He has been feeling sick since Friday night (2 days PTA). He has been vomiting several times, bringing up yellow and sometimes dark emesis. He has not eaten and has not used his insulin since this all started. He reportedly has not gotten out of bed since yesterday morning. EMS found a pitcher full of vomit next to him. ER evaluation is remarkable for elevated AG, acute renal failure and BG >500. He was started on insulin drip    ______________________________________________________________________  DISCHARGE SUMMARY/HOSPITAL COURSE:  for full details see H&P, daily progress notes, labs, consult notes. Type 1 DM, uncontrolled in DKA:   -gap closed  -n/v try clear liquids, advance diet as tolerated  -start reduced dose lantus + SSI  -reports adherence w lantus regimen at home. A1C 13.1  -counseled extensively on importance of compliance w insulin regimen  -needs to establish care with PCP, given care card by case management  -emphasized importance of diabetic foot and vision exams annually    Rt foot scaphoid deformity  -Rt great toe wound, Xray Rt foot no acute process, deformity which appears chronic of scaphoid suggesting osteonecrosis on chronic basis. Diffuse soft tissue swelling without underlying bone abnormality of briseyda toe. Informed of need to follow up with podiatry once he's established care with PCP     Abdominal pain/Hematemesis  -vomiting earlier in admission, noted some ? blood mixed w vomitus  -n/v resolved  -appreciate GI consult  -nL H/H noted  -possible bleeding from MW tear or esophagitis  -no EGD for now (done w/in last 6mos)  -restart PPI, sent Rx  -abd U/S no acute findings  -OP GES once acute issues resolved to exclude DM gastroparesis    Hx Hep C  --acute hepatitis panel reactive for Hep C Ab, viral load pending. If viral load detected, OP f/u at [facility] with Dr Tyrese Madsen as OP    [place]  -resolved w IVF admin  -in setting of severe dehydration POA 2/2 DKA     Hypokalemia  -replete and monitor BMP     Lactic acidosis    -resolved     Hx Depression / Bipolar   Hx Seizures    H/O Hepatitis C:  LFT so far stable, monitor     _______________________________________________________________________  Patient seen and examined by me on discharge day. Pertinent Findings:  Gen:    Not in distress  Chest: Clear lungs  CVS:   Regular rhythm. No edema  Abd:  Soft, not distended, not tender  Neuro:  Alert, oriented x 3  _______________________________________________________________________  DISCHARGE MEDICATIONS:   Current Discharge Medication List      START taking these medications    Details   pantoprazole (PROTONIX) 40 mg tablet Take 1 Tab by mouth daily. Qty: 30 Tab, Refills: 5         CONTINUE these medications which have NOT CHANGED    Details   insulin glargine (LANTUS U-100 INSULIN) 100 unit/mL injection 46 Units by SubCUTAneous route daily. insulin regular (NOVOLIN R) 100 unit/mL injection 2-10 Units by SubCUTAneous route Before breakfast, lunch, and dinner. My Recommended Diet, Activity, Wound Care, and follow-up labs are listed in the patient's Discharge Insturctions which I have personally completed and reviewed.     ______________________________________________________________________    Risk of deterioration: Low    Condition at Discharge:  Stable  ______________________________________________________________________    Disposition  Home with family, no needs  ______________________________________________________________________    Care Plan discussed with:   Patient, Family, RN, Care Manager, Consultant    ______________________________________________________________________    Code Status: Full Code  ______________________________________________________________________      Follow up with:   PCP : None  Follow-up Information     Follow up With Specialties Details Why Contact Info    None    None ([phone]) Patient stated that they have no PCP      Elaina Valle MD Gastroenterology Call  [address] [place] [address]  [phone]            Total time in minutes spent coordinating this discharge (includes going over instructions, follow-up, prescriptions, and preparing report for sign off to her PCP) :  > 30 minutes    Signed:  Miley Rowe DO

## 2019-04-17 NOTE — ROUTINE PROCESS
The following appointments have been successfully scheduled:    Date/time Thursday, May 02, 2019 09:30 AM  Patient  Wilma Laboyer 1980 (89DT M) #[id number] [id number]  Department [facility] [address]  Appointment type New Patient  Provider [facility]

## 2019-04-17 NOTE — PROGRESS NOTES
To whom it may concern,    Linda Dhillon was hospitalized as an inpatient at [facility] from 4/14/2019 to 4/17/2018. He is clear to return to work immediately.

## 2019-04-17 NOTE — PROGRESS NOTES
Gastric emptying scan will be done as an outpatient. Was discussed with Lee Velásquez (ASAD) who spoke with ordering physician. Pt is getting discharged today.

## 2019-04-17 NOTE — PROGRESS NOTES
19:30  Bedside shift change report given to Edel Kennedy RN (oncoming nurse) by Reyna Diaz RN (offgoing nurse). Report included the following information SBAR, Kardex, MAR, Recent Results and Cardiac Rhythm NSR.     21:08  Following message sent to hospitalist claire via telemed IQ request:    Off going nurse received telephone order from Dr. Marleny Salamanca to d/c insulin gtt 2 hours after giving lantus. However order was never put in. Off going nurse does not feel comfortable entering order since Dr. Marleny Salamanca said he would place order. Please place order to d/c insulin gtt and continuous fluids.

## 2019-04-17 NOTE — PROGRESS NOTES
Bedside shift change report given to Claudia Cartwright RN (oncoming nurse) by Param Hernandez RN (offgoing nurse). Report included the following information SBAR, Kardex, MAR, Recent Results and Cardiac Rhythm NSR. Pt was medicated for his chronic leg pain with prn oxycodone q4h last night. Pt states this is how he takes his pain medication at home when pain flares up, however when pain is not bothering him, he is able to go longer, sometimes even days without taking pain medication. Discussed with pt the risk of addiction with oxycodone. Pt verbalized understanding during the discussion we had this morning. Param Hernandez RN

## 2019-04-17 NOTE — PROGRESS NOTES
Gastroenterology Daily Progress Note   (Dulce Russ PA-C covering for Dr. Audrey Starr)   [address] [place] Date: 4/14/2019     F/u for DKA   N/V with hematemesis    Subjective:      Patient is doing well, no further N/V tolerating diabetic diet. No abdominal pain, just pain in bilateral feet. No fevers or chills. Had a normal BM this AM, no melena or hematochezia. No new complaints at this time. Current Facility-Administered Medications   Medication Dose Route Frequency    dextrose (D50W) injection syrg 12.5-25 g  25-50 mL IntraVENous PRN    insulin lispro (HUMALOG) injection   SubCUTAneous AC&HS    glucose chewable tablet 16 g  4 Tab Oral PRN    glucagon (GLUCAGEN) injection 1 mg  1 mg IntraMUSCular PRN    insulin glargine (LANTUS) injection 30 Units  30 Units SubCUTAneous DAILY    aluminum-magnesium hydroxide (MAALOX) oral suspension 15 mL  15 mL Oral QID PRN    acetaminophen (TYLENOL) tablet 1,000 mg  1,000 mg Oral Q6H PRN    pantoprazole (PROTONIX) 40 mg in sodium chloride 0.9% 10 mL injection  40 mg IntraVENous Q12H    ondansetron (ZOFRAN) injection 4 mg  4 mg IntraVENous Q4H PRN    oxyCODONE-acetaminophen (PERCOCET) 5-325 mg per tablet 1 Tab  1 Tab Oral Q4H PRN        Objective:     Visit Vitals  /75   Pulse 82   Temp 98.4 °F (36.9 °C)   Resp 14   Ht 5' 9\" (1.753 m)   Wt 58.3 kg (128 lb 8.5 oz)   SpO2 99%   BMI 18.98 kg/m²   Blood pressure 128/75, pulse 82, temperature 98.4 °F (36.9 °C), resp. rate 14, height 5' 9\" (1.753 m), weight 58.3 kg (128 lb 8.5 oz), SpO2 99 %. No intake/output data recorded.     04/15 1901 - 04/17 0700  In: 2456.9 [I.V.:2456.9]  Out: 4300 [Urine:4300]      Intake/Output Summary (Last 24 hours) at 4/17/2019 0857  Last data filed at 4/16/2019 1859  Gross per 24 hour   Intake 1122.52 ml   Output 3350 ml   Net -2227.48 ml         Physical Exam:       General: WM, NAD, resting comfortably in bed  Chest:  CTA, No rhonchi, rales or rubs.  Heart: S1, S2, RRR  GI: Soft, NT, ND, normal bowel sounds  Extremities: small ulcer on plantar surface of right great toe  CNS: CN II-XII normal.      Labs:       Recent Results (from the past 24 hour(s))   GLUCOSE, POC    Collection Time: 04/16/19  9:31 AM   Result Value Ref Range    Glucose (POC) 180 (H) 65 - 100 mg/dL    Performed by Lindsay Barry    Collection Time: 04/16/19  9:31 AM   Result Value Ref Range    Glucose 180 mg/dL    Insulin order 2.4 units/hour    Insulin adminstered 2.4 units/hour    Multiplier 0.020     Low target 150 mg/dL    High target 250 mg/dL    D50 order 0.0 ml    D50 administered 0.00 ml    Minutes until next BG 60 min    Order initials bb     Administered initials bb     GLSCOM Comments     GLUCOSE, POC    Collection Time: 04/16/19 10:41 AM   Result Value Ref Range    Glucose (POC) 194 (H) 65 - 100 mg/dL    Performed by Tennis Ocala    Collection Time: 04/16/19 10:50 AM   Result Value Ref Range    Glucose 194 mg/dL    Insulin order 2.7 units/hour    Insulin adminstered 2.7 units/hour    Multiplier 0.020     Low target 150 mg/dL    High target 250 mg/dL    D50 order 0.0 ml    D50 administered 0.00 ml    Minutes until next  min    Order initials bb     Administered initials bb     GLSCJESSICA Comments     GLUCOSE, POC    Collection Time: 04/16/19 11:04 AM   Result Value Ref Range    Glucose (POC) 159 (H) 65 - 100 mg/dL    Performed by CRUZ WELLS(AMI)    GLUCOSE, POC    Collection Time: 04/16/19 12:53 PM   Result Value Ref Range    Glucose (POC) 134 (H) 65 - 100 mg/dL    Performed by Tennis Ocala    Collection Time: 04/16/19 12:58 PM   Result Value Ref Range    Glucose 134 mg/dL    Insulin order 0.7 units/hour    Insulin adminstered 0.7 units/hour    Multiplier 0.010     Low target 150 mg/dL    High target 250 mg/dL    D50 order 0.0 ml    D50 administered 0.00 ml    Minutes until next BG 60 min    Order initials bb Administered initials bb     GLSCOM Comments     GLUCOSE, POC    Collection Time: 04/16/19  2:03 PM   Result Value Ref Range    Glucose (POC) 256 (H) 65 - 100 mg/dL    Performed by Ozella Palm    Collection Time: 04/16/19  2:03 PM   Result Value Ref Range    Glucose 256 mg/dL    Insulin order 3.9 units/hour    Insulin adminstered 3.9 units/hour    Multiplier 0.020     Low target 150 mg/dL    High target 250 mg/dL    D50 order 0.0 ml    D50 administered 0.00 ml    Minutes until next BG 60 min    Order initials bb     Administered initials bb     GLSCOM Comments     GLUCOSE, POC    Collection Time: 04/16/19  3:14 PM   Result Value Ref Range    Glucose (POC) 192 (H) 65 - 100 mg/dL    Performed by Ozella Palm    Collection Time: 04/16/19  3:14 PM   Result Value Ref Range    Glucose 192 mg/dL    Insulin order 2.6 units/hour    Insulin adminstered 2.6 units/hour    Multiplier 0.020     Low target 150 mg/dL    High target 250 mg/dL    D50 order 0.0 ml    D50 administered 0.00 ml    Minutes until next BG 60 min    Order initials bb     Administered initials bb     GLSCOM Comments     GLUCOSE, POC    Collection Time: 04/16/19  4:20 PM   Result Value Ref Range    Glucose (POC) 147 (H) 65 - 100 mg/dL    Performed by Kranthi Patricia    Collection Time: 04/16/19  4:21 PM   Result Value Ref Range    Glucose 147 mg/dL    Insulin order 0.9 units/hour    Insulin adminstered 0.9 units/hour    Multiplier 0.010     Low target 150 mg/dL    High target 250 mg/dL    D50 order 0.0 ml    D50 administered 0.00 ml    Minutes until next BG 60 min    Order initials bb     Administered initials bb     GLSCOM Comments     GLUCOSE, POC    Collection Time: 04/16/19  5:26 PM   Result Value Ref Range    Glucose (POC) 187 (H) 65 - 100 mg/dL    Performed by Ozella Palm    Collection Time: 04/16/19  5:27 PM   Result Value Ref Range    Glucose 187 mg/dL    Insulin order 1.3 units/hour    Insulin adminstered 1.3 units/hour    Multiplier 0.010     Low target 150 mg/dL    High target 250 mg/dL    D50 order 0.0 ml    D50 administered 0.00 ml    Minutes until next BG 60 min    Order initials bb     Administered initials bb     GLSCOM Comments     GLUCOSE, POC    Collection Time: 04/16/19  6:37 PM   Result Value Ref Range    Glucose (POC) 191 (H) 65 - 100 mg/dL    Performed by Crystal Richard    Collection Time: 04/16/19  6:37 PM   Result Value Ref Range    Glucose 191 mg/dL    Insulin order 1.3 units/hour    Insulin adminstered 1.3 units/hour    Multiplier 0.010     Low target 150 mg/dL    High target 250 mg/dL    D50 order 0.0 ml    D50 administered 0.00 ml    Minutes until next BG 60 min    Order initials bb     Administered initials bb     GLSCOM Comments     GLUCOSE, POC    Collection Time: 04/16/19  7:43 PM   Result Value Ref Range    Glucose (POC) 255 (H) 65 - 100 mg/dL    Performed by Manoj Bolanos (PCT)    GLUCOSTABILIZER    Collection Time: 04/16/19  7:44 PM   Result Value Ref Range    Glucose 255 mg/dL    Insulin order 3.9 units/hour    Insulin adminstered 3.9 units/hour    Multiplier 0.020     Low target 150 mg/dL    High target 250 mg/dL    D50 order 0.0 ml    D50 administered 0.00 ml    Minutes until next BG 60 min    Order initials bb     Administered initials bb     GLSCOM Comments     GLUCOSE, POC    Collection Time: 04/16/19  8:48 PM   Result Value Ref Range    Glucose (POC) 183 (H) 65 - 100 mg/dL    Performed by Manoj Bolanos (PCT)    GLUCOSE, POC    Collection Time: 04/16/19 11:06 PM   Result Value Ref Range    Glucose (POC) 208 (H) 65 - 100 mg/dL    Performed by [place]    CBC WITH AUTOMATED DIFF    Collection Time: 04/17/19  4:18 AM   Result Value Ref Range    WBC 5.4 4.1 - 11.1 K/uL    RBC 3.64 (L) 4.10 - 5.70 M/uL    HGB 11.7 (L) 12.1 - 17.0 g/dL    HCT 32.8 (L) 36.6 - 50.3 %    MCV 90.1 80.0 - 99.0 FL    MCH 32.1 26.0 - 34.0 PG    MCHC 35.7 30.0 - 36.5 g/dL    RDW 12.4 11.5 - 14.5 %    PLATELET 582 222 - 922 K/uL    MPV 10.1 8.9 - 12.9 FL    NRBC 0.0 0  WBC    ABSOLUTE NRBC 0.00 0.00 - 0.01 K/uL    NEUTROPHILS 42 32 - 75 %    LYMPHOCYTES 48 12 - 49 %    MONOCYTES 8 5 - 13 %    EOSINOPHILS 2 0 - 7 %    BASOPHILS 0 0 - 1 %    IMMATURE GRANULOCYTES 0 0.0 - 0.5 %    ABS. NEUTROPHILS 2.3 1.8 - 8.0 K/UL    ABS. LYMPHOCYTES 2.5 0.8 - 3.5 K/UL    ABS. MONOCYTES 0.4 0.0 - 1.0 K/UL    ABS. EOSINOPHILS 0.1 0.0 - 0.4 K/UL    ABS. BASOPHILS 0.0 0.0 - 0.1 K/UL    ABS. IMM. GRANS. 0.0 0.00 - 0.04 K/UL    DF AUTOMATED     METABOLIC PANEL, BASIC    Collection Time: 04/17/19  4:18 AM   Result Value Ref Range    Sodium 137 136 - 145 mmol/L    Potassium 3.2 (L) 3.5 - 5.1 mmol/L    Chloride 105 97 - 108 mmol/L    CO2 29 21 - 32 mmol/L    Anion gap 3 (L) 5 - 15 mmol/L    Glucose 190 (H) 65 - 100 mg/dL    BUN 10 6 - 20 MG/DL    Creatinine 0.50 (L) 0.70 - 1.30 MG/DL    BUN/Creatinine ratio 20 12 - 20      GFR est AA >60 >60 ml/min/1.73m2    GFR est non-AA >60 >60 ml/min/1.73m2    Calcium 8.2 (L) 8.5 - 10.1 MG/DL   GLUCOSE, POC    Collection Time: 04/17/19  7:30 AM   Result Value Ref Range    Glucose (POC) 209 (H) 65 - 100 mg/dL    Performed by Fanny RODRIGUEZ(wbc:2,hgb:2,hct:2,plt:2,)  Recent Labs     04/17/19  0418 04/16/19  0203 04/15/19  1822 04/15/19  1227  04/14/19  1807    140 137 135*   < > 136   K 3.2* 3.2* 3.2* 3.8   < > 4.1    106 106 102   < > 101   CO2 29 26 25 25   < > 8*   BUN 10 20 24* 33*   < > 40*   CREA 0.50* 0.89 1.00 1.22   < > 1.92*   * 239* 209* 193*   < > 479*   CA 8.2* 7.8* 7.7* 8.5   < > 8.8   MG  --  2.2 1.9 2.0   < > 2.5*   PHOS  --  1.7*  --   --   --  4.1    < > = values in this interval not displayed.    LABRCNT(sgot:3,gpt:3,ap:3,tbiL:3,TP:3,ALB:3,GLOB:3,ggt:3,aml:3,amyp:3,lpse:3,hlpse:3)  Recent Labs     04/16/19  0203   INR 1.0   PTP 10.3     Recent Labs     04/16/19  0203 04/15/19  0441 04/14/19  1310 SGOT 15  --  16   *  --  214*   TP 5.5*  --  8.6*   ALB 2.7*  --  4.3   GLOB 2.8  --  4.3*   LPSE  --  248  --    BRIEFLAB(B12,FOL,FOLAT,RBCF)No results found for: FOL, RBCFLABRCNT(CPK:3,CpKMB:3,ckndx:3,troiq:3)No components found for: GLPOCBRIEFLAB(CHOL,CHOLX,CHOLP,CHLST,CHOLV,HDL,HDLC,HDLP,LDL,DLDL,LDLC,DLDLP,TGL,TGLX,TRIGL,TRIGP,CHHD,CHHDX)No results for input(s): PH, PCO2, PO2 in the last 72 hours. LABRCNT(CPK:3,CpKMB:3,ckndx:3,troiq:3)MANUEL Ovalles  Recent Labs     04/14/19  1310   TROIQ <0.05   MANUEL Hickman   US Results (most recent):  [place] [facility] [place]   US ABD COMP    Narrative EXAM: US ABD COMP     INDICATION: Elevated liver function tests. TECHNIQUE:   Real-time sonography of the abdomen was performed with multiple static images of  the liver, gallbladder, pancreas, spleen, kidneys and retroperitoneum obtained. FINDINGS:  LIVER:   The liver is normal in echotexture with no mass or other focal abnormality. LIVER VASCULATURE:   The portal vein flow is antegrade. GALLBLADDER:  The gallbladder is normal and no stones are identified. There is no wall  thickening or fluid around the gallbladder. COMMON BILE DUCT:  There is no biliary duct dilatation and the common duct measures 3 mm in  diameter. PANCREAS:  The visualized pancreas is normal.    SPLEEN:  The spleen is normal in echotexture and size and measures 10.3 cm in length. RIGHT KIDNEY:  The right kidney demonstrates normal echogenicity with no mass, stone or  hydronephrosis. The right kidney measures 15.0 cm in length. LEFT KIDNEY:  The left kidney demonstrates normal echogenicity with no mass, stone or  hydronephrosis. The left kidney measures 14.2 cm in length. RETROPERITONEUM:  The aorta tapers normally. The IVC is normal.  There is no ascites. Impression IMPRESSION: Normal abdominal ultrasound examination.              Problem List:     Active Problems: DKA, type 1, not at goal [facility]) (4/14/2019)      Nausea with vomiting (4/16/2019)      Impression:  DKA, uncontrolled type 1 DM  Nausea with vomiting/hematemesis  Hx of erosive esophagitis  Elevated LFTs       Plan:  Overall pt is doing well, tolerating diabetic diet without any further N/V. Will order GES but can be done as O/P, should not hold up d/c. Continue PPI on d/c secondary to hx of erosive/ulcerative esophagitis. Hep C viral load with reflex genotype pending (Hep C Ab reactive). Discussed with pt that if viral load detected we would recommend est with   Dr. Aleida Covington as an O/P to discuss treatment options. No abnormalities seen on abdominal US.        MANUEL Alaniz    4/17/2019  10:35 AM   [address] [facility], [address] [place] [address]  [address] [id number]  [address] [place] [address] [place]: [phone]

## 2019-04-19 LAB
HCV GENOTYPE: NORMAL
HCV RNA SERPL NAA+PROBE-ACNC: NORMAL IU/ML
HCV RNA SERPL NAA+PROBE-LOG IU: NORMAL LOG10 IU/ML
TEST INFORMATION, 550045: NORMAL

## 2019-07-06 ENCOUNTER — APPOINTMENT (OUTPATIENT)
Dept: GENERAL RADIOLOGY | Age: 39
DRG: 638 | End: 2019-07-06
Attending: EMERGENCY MEDICINE
Payer: SELF-PAY

## 2019-07-06 ENCOUNTER — HOSPITAL ENCOUNTER (INPATIENT)
Age: 39
LOS: 1 days | Discharge: HOME | DRG: 638 | End: 2019-07-07
Attending: EMERGENCY MEDICINE | Admitting: INTERNAL MEDICINE
Payer: SELF-PAY

## 2019-07-06 DIAGNOSIS — E10.10 DIABETIC KETOACIDOSIS WITHOUT COMA ASSOCIATED WITH TYPE 1 DIABETES MELLITUS (HCC): Primary | ICD-10-CM

## 2019-07-06 DIAGNOSIS — E10.40 DIABETIC NEUROPATHY, TYPE I DIABETES MELLITUS (HCC): ICD-10-CM

## 2019-07-06 PROBLEM — E11.10 DKA (DIABETIC KETOACIDOSES): Status: ACTIVE | Noted: 2019-07-06

## 2019-07-06 LAB
ADMINISTERED INITIALS, ADMINIT: NORMAL
ALBUMIN SERPL-MCNC: 3.7 G/DL (ref 3.5–5)
ALBUMIN/GLOB SERPL: 0.9 {RATIO} (ref 1.1–2.2)
ALP SERPL-CCNC: 258 U/L (ref 45–117)
ALT SERPL-CCNC: 73 U/L (ref 12–78)
ANION GAP SERPL CALC-SCNC: 12 MMOL/L (ref 5–15)
ANION GAP SERPL CALC-SCNC: 21 MMOL/L (ref 5–15)
ANION GAP SERPL CALC-SCNC: 6 MMOL/L (ref 5–15)
APPEARANCE UR: CLEAR
AST SERPL-CCNC: 28 U/L (ref 15–37)
BACTERIA URNS QL MICRO: NEGATIVE /HPF
BASOPHILS # BLD: 0 K/UL (ref 0–0.1)
BASOPHILS NFR BLD: 0 % (ref 0–1)
BILIRUB SERPL-MCNC: 0.6 MG/DL (ref 0.2–1)
BILIRUB UR QL: NEGATIVE
BUN SERPL-MCNC: 22 MG/DL (ref 6–20)
BUN SERPL-MCNC: 23 MG/DL (ref 6–20)
BUN SERPL-MCNC: 26 MG/DL (ref 6–20)
BUN/CREAT SERPL: 19 (ref 12–20)
BUN/CREAT SERPL: 22 (ref 12–20)
BUN/CREAT SERPL: 22 (ref 12–20)
CALCIUM SERPL-MCNC: 8 MG/DL (ref 8.5–10.1)
CALCIUM SERPL-MCNC: 8.8 MG/DL (ref 8.5–10.1)
CALCIUM SERPL-MCNC: 9.7 MG/DL (ref 8.5–10.1)
CHLORIDE SERPL-SCNC: 103 MMOL/L (ref 97–108)
CHLORIDE SERPL-SCNC: 105 MMOL/L (ref 97–108)
CHLORIDE SERPL-SCNC: 91 MMOL/L (ref 97–108)
CK MB CFR SERPL CALC: 2.1 % (ref 0–2.5)
CK MB SERPL-MCNC: 1.4 NG/ML (ref 5–25)
CK SERPL-CCNC: 68 U/L (ref 39–308)
CO2 SERPL-SCNC: 19 MMOL/L (ref 21–32)
CO2 SERPL-SCNC: 24 MMOL/L (ref 21–32)
CO2 SERPL-SCNC: 26 MMOL/L (ref 21–32)
COLOR UR: ABNORMAL
CREAT SERPL-MCNC: 0.99 MG/DL (ref 0.7–1.3)
CREAT SERPL-MCNC: 1.05 MG/DL (ref 0.7–1.3)
CREAT SERPL-MCNC: 1.36 MG/DL (ref 0.7–1.3)
D50 ADMINISTERED, D50ADM: 0 ML
D50 ORDER, D50ORD: 0 ML
DIFFERENTIAL METHOD BLD: ABNORMAL
EOSINOPHIL # BLD: 0 K/UL (ref 0–0.4)
EOSINOPHIL NFR BLD: 1 % (ref 0–7)
EPITH CASTS URNS QL MICRO: ABNORMAL /LPF
ERYTHROCYTE [DISTWIDTH] IN BLOOD BY AUTOMATED COUNT: 12.6 % (ref 11.5–14.5)
EST. AVERAGE GLUCOSE BLD GHB EST-MCNC: 326 MG/DL
GLOBULIN SER CALC-MCNC: 4.3 G/DL (ref 2–4)
GLSCOM COMMENTS: NORMAL
GLUCOSE BLD STRIP.AUTO-MCNC: 104 MG/DL (ref 65–100)
GLUCOSE BLD STRIP.AUTO-MCNC: 136 MG/DL (ref 65–100)
GLUCOSE BLD STRIP.AUTO-MCNC: 161 MG/DL (ref 65–100)
GLUCOSE BLD STRIP.AUTO-MCNC: 170 MG/DL (ref 65–100)
GLUCOSE BLD STRIP.AUTO-MCNC: 183 MG/DL (ref 65–100)
GLUCOSE BLD STRIP.AUTO-MCNC: 192 MG/DL (ref 65–100)
GLUCOSE BLD STRIP.AUTO-MCNC: 219 MG/DL (ref 65–100)
GLUCOSE BLD STRIP.AUTO-MCNC: 273 MG/DL (ref 65–100)
GLUCOSE BLD STRIP.AUTO-MCNC: 334 MG/DL (ref 65–100)
GLUCOSE BLD STRIP.AUTO-MCNC: 495 MG/DL (ref 65–100)
GLUCOSE BLD STRIP.AUTO-MCNC: 536 MG/DL (ref 65–100)
GLUCOSE SERPL-MCNC: 220 MG/DL (ref 65–100)
GLUCOSE SERPL-MCNC: 250 MG/DL (ref 65–100)
GLUCOSE SERPL-MCNC: 537 MG/DL (ref 65–100)
GLUCOSE UR STRIP.AUTO-MCNC: >1000 MG/DL
GLUCOSE, GLC: 104 MG/DL
GLUCOSE, GLC: 136 MG/DL
GLUCOSE, GLC: 161 MG/DL
GLUCOSE, GLC: 170 MG/DL
GLUCOSE, GLC: 183 MG/DL
GLUCOSE, GLC: 192 MG/DL
GLUCOSE, GLC: 219 MG/DL
GLUCOSE, GLC: 273 MG/DL
GLUCOSE, GLC: 334 MG/DL
HBA1C MFR BLD: 13 % (ref 4.2–6.3)
HCT VFR BLD AUTO: 48.3 % (ref 36.6–50.3)
HGB BLD-MCNC: 16.6 G/DL (ref 12.1–17)
HGB UR QL STRIP: ABNORMAL
HIGH TARGET, HITG: 250 MG/DL
HYALINE CASTS URNS QL MICRO: ABNORMAL /LPF (ref 0–5)
IMM GRANULOCYTES # BLD AUTO: 0 K/UL (ref 0–0.04)
IMM GRANULOCYTES NFR BLD AUTO: 0 % (ref 0–0.5)
INSULIN ADMINSTERED, INSADM: 0.4 UNITS/HOUR
INSULIN ADMINSTERED, INSADM: 1.5 UNITS/HOUR
INSULIN ADMINSTERED, INSADM: 3 UNITS/HOUR
INSULIN ADMINSTERED, INSADM: 3.3 UNITS/HOUR
INSULIN ADMINSTERED, INSADM: 3.7 UNITS/HOUR
INSULIN ADMINSTERED, INSADM: 4 UNITS/HOUR
INSULIN ADMINSTERED, INSADM: 4.8 UNITS/HOUR
INSULIN ADMINSTERED, INSADM: 5.5 UNITS/HOUR
INSULIN ADMINSTERED, INSADM: 6.4 UNITS/HOUR
INSULIN ORDER, INSORD: 0.4 UNITS/HOUR
INSULIN ORDER, INSORD: 1.5 UNITS/HOUR
INSULIN ORDER, INSORD: 3 UNITS/HOUR
INSULIN ORDER, INSORD: 3.3 UNITS/HOUR
INSULIN ORDER, INSORD: 3.7 UNITS/HOUR
INSULIN ORDER, INSORD: 4 UNITS/HOUR
INSULIN ORDER, INSORD: 4.8 UNITS/HOUR
INSULIN ORDER, INSORD: 5.5 UNITS/HOUR
INSULIN ORDER, INSORD: 6.4 UNITS/HOUR
KETONES UR QL STRIP.AUTO: >80 MG/DL
LEUKOCYTE ESTERASE UR QL STRIP.AUTO: NEGATIVE
LOW TARGET, LOT: 150 MG/DL
LYMPHOCYTES # BLD: 1.1 K/UL (ref 0.8–3.5)
LYMPHOCYTES NFR BLD: 17 % (ref 12–49)
MAGNESIUM SERPL-MCNC: 1.9 MG/DL (ref 1.6–2.4)
MAGNESIUM SERPL-MCNC: 2.2 MG/DL (ref 1.6–2.4)
MAGNESIUM SERPL-MCNC: 2.6 MG/DL (ref 1.6–2.4)
MCH RBC QN AUTO: 32 PG (ref 26–34)
MCHC RBC AUTO-ENTMCNC: 34.4 G/DL (ref 30–36.5)
MCV RBC AUTO: 93.1 FL (ref 80–99)
MINUTES UNTIL NEXT BG, NBG: 60 MIN
MONOCYTES # BLD: 0.4 K/UL (ref 0–1)
MONOCYTES NFR BLD: 6 % (ref 5–13)
MULTIPLIER, MUL: 0.01
MULTIPLIER, MUL: 0.02
MULTIPLIER, MUL: 0.02
MULTIPLIER, MUL: 0.03
NEUTS SEG # BLD: 5 K/UL (ref 1.8–8)
NEUTS SEG NFR BLD: 76 % (ref 32–75)
NITRITE UR QL STRIP.AUTO: NEGATIVE
NRBC # BLD: 0 K/UL (ref 0–0.01)
NRBC BLD-RTO: 0 PER 100 WBC
ORDER INITIALS, ORDINIT: NORMAL
PH UR STRIP: 5.5 [PH] (ref 5–8)
PHOSPHATE SERPL-MCNC: 6.2 MG/DL (ref 2.6–4.7)
PLATELET # BLD AUTO: 300 K/UL (ref 150–400)
PMV BLD AUTO: 10.8 FL (ref 8.9–12.9)
POTASSIUM SERPL-SCNC: 3.7 MMOL/L (ref 3.5–5.1)
POTASSIUM SERPL-SCNC: 4.1 MMOL/L (ref 3.5–5.1)
POTASSIUM SERPL-SCNC: 4.3 MMOL/L (ref 3.5–5.1)
PROT SERPL-MCNC: 8 G/DL (ref 6.4–8.2)
PROT UR STRIP-MCNC: 100 MG/DL
RBC # BLD AUTO: 5.19 M/UL (ref 4.1–5.7)
RBC #/AREA URNS HPF: ABNORMAL /HPF (ref 0–5)
SERVICE CMNT-IMP: ABNORMAL
SODIUM SERPL-SCNC: 131 MMOL/L (ref 136–145)
SODIUM SERPL-SCNC: 137 MMOL/L (ref 136–145)
SODIUM SERPL-SCNC: 139 MMOL/L (ref 136–145)
SP GR UR REFRACTOMETRY: 1.02 (ref 1–1.03)
TROPONIN I SERPL-MCNC: <0.05 NG/ML
UA: UC IF INDICATED,UAUC: ABNORMAL
UROBILINOGEN UR QL STRIP.AUTO: 0.2 EU/DL (ref 0.2–1)
WBC # BLD AUTO: 6.6 K/UL (ref 4.1–11.1)
WBC URNS QL MICRO: ABNORMAL /HPF (ref 0–4)

## 2019-07-06 PROCEDURE — 81001 URINALYSIS AUTO W/SCOPE: CPT

## 2019-07-06 PROCEDURE — 74011250636 HC RX REV CODE- 250/636: Performed by: INTERNAL MEDICINE

## 2019-07-06 PROCEDURE — 99285 EMERGENCY DEPT VISIT HI MDM: CPT

## 2019-07-06 PROCEDURE — 82550 ASSAY OF CK (CPK): CPT

## 2019-07-06 PROCEDURE — 84100 ASSAY OF PHOSPHORUS: CPT

## 2019-07-06 PROCEDURE — 82553 CREATINE MB FRACTION: CPT

## 2019-07-06 PROCEDURE — 93005 ELECTROCARDIOGRAM TRACING: CPT

## 2019-07-06 PROCEDURE — 85025 COMPLETE CBC W/AUTO DIFF WBC: CPT

## 2019-07-06 PROCEDURE — 96375 TX/PRO/DX INJ NEW DRUG ADDON: CPT

## 2019-07-06 PROCEDURE — 80053 COMPREHEN METABOLIC PANEL: CPT

## 2019-07-06 PROCEDURE — 96361 HYDRATE IV INFUSION ADD-ON: CPT

## 2019-07-06 PROCEDURE — 80048 BASIC METABOLIC PNL TOTAL CA: CPT

## 2019-07-06 PROCEDURE — 74011636637 HC RX REV CODE- 636/637: Performed by: EMERGENCY MEDICINE

## 2019-07-06 PROCEDURE — 83036 HEMOGLOBIN GLYCOSYLATED A1C: CPT

## 2019-07-06 PROCEDURE — 84484 ASSAY OF TROPONIN QUANT: CPT

## 2019-07-06 PROCEDURE — 74011250636 HC RX REV CODE- 250/636: Performed by: EMERGENCY MEDICINE

## 2019-07-06 PROCEDURE — 96374 THER/PROPH/DIAG INJ IV PUSH: CPT

## 2019-07-06 PROCEDURE — 65660000000 HC RM CCU STEPDOWN

## 2019-07-06 PROCEDURE — 83735 ASSAY OF MAGNESIUM: CPT

## 2019-07-06 PROCEDURE — 82962 GLUCOSE BLOOD TEST: CPT

## 2019-07-06 PROCEDURE — 74011250636 HC RX REV CODE- 250/636: Performed by: GENERAL ACUTE CARE HOSPITAL

## 2019-07-06 PROCEDURE — 74011000258 HC RX REV CODE- 258: Performed by: EMERGENCY MEDICINE

## 2019-07-06 PROCEDURE — 71045 X-RAY EXAM CHEST 1 VIEW: CPT

## 2019-07-06 PROCEDURE — 74011250637 HC RX REV CODE- 250/637: Performed by: INTERNAL MEDICINE

## 2019-07-06 PROCEDURE — 74011000250 HC RX REV CODE- 250: Performed by: INTERNAL MEDICINE

## 2019-07-06 PROCEDURE — 36415 COLL VENOUS BLD VENIPUNCTURE: CPT

## 2019-07-06 RX ORDER — DEXTROSE MONOHYDRATE 100 MG/ML
125-250 INJECTION, SOLUTION INTRAVENOUS AS NEEDED
Status: DISCONTINUED | OUTPATIENT
Start: 2019-07-06 | End: 2019-07-07

## 2019-07-06 RX ORDER — SODIUM CHLORIDE 0.9 % (FLUSH) 0.9 %
5-40 SYRINGE (ML) INJECTION EVERY 8 HOURS
Status: DISCONTINUED | OUTPATIENT
Start: 2019-07-06 | End: 2019-07-07 | Stop reason: SDUPTHER

## 2019-07-06 RX ORDER — ONDANSETRON 2 MG/ML
4 INJECTION INTRAMUSCULAR; INTRAVENOUS
Status: DISCONTINUED | OUTPATIENT
Start: 2019-07-06 | End: 2019-07-07 | Stop reason: HOSPADM

## 2019-07-06 RX ORDER — ENOXAPARIN SODIUM 100 MG/ML
40 INJECTION SUBCUTANEOUS EVERY 24 HOURS
Status: DISCONTINUED | OUTPATIENT
Start: 2019-07-07 | End: 2019-07-07 | Stop reason: HOSPADM

## 2019-07-06 RX ORDER — SODIUM CHLORIDE 0.9 % (FLUSH) 0.9 %
5-40 SYRINGE (ML) INJECTION AS NEEDED
Status: DISCONTINUED | OUTPATIENT
Start: 2019-07-06 | End: 2019-07-07 | Stop reason: HOSPADM

## 2019-07-06 RX ORDER — ONDANSETRON 2 MG/ML
4 INJECTION INTRAMUSCULAR; INTRAVENOUS
Status: COMPLETED | OUTPATIENT
Start: 2019-07-06 | End: 2019-07-06

## 2019-07-06 RX ORDER — INSULIN LISPRO 100 [IU]/ML
INJECTION, SOLUTION INTRAVENOUS; SUBCUTANEOUS
Status: DISCONTINUED | OUTPATIENT
Start: 2019-07-06 | End: 2019-07-07

## 2019-07-06 RX ORDER — ACETAMINOPHEN 325 MG/1
650 TABLET ORAL
Status: DISCONTINUED | OUTPATIENT
Start: 2019-07-06 | End: 2019-07-06

## 2019-07-06 RX ORDER — SODIUM CHLORIDE 0.9 % (FLUSH) 0.9 %
5-40 SYRINGE (ML) INJECTION EVERY 8 HOURS
Status: DISCONTINUED | OUTPATIENT
Start: 2019-07-06 | End: 2019-07-07 | Stop reason: HOSPADM

## 2019-07-06 RX ORDER — ACETAMINOPHEN 325 MG/1
650 TABLET ORAL
Status: DISCONTINUED | OUTPATIENT
Start: 2019-07-06 | End: 2019-07-07 | Stop reason: HOSPADM

## 2019-07-06 RX ORDER — DEXTROSE, SODIUM CHLORIDE, AND POTASSIUM CHLORIDE 5; .45; .15 G/100ML; G/100ML; G/100ML
200 INJECTION INTRAVENOUS CONTINUOUS
Status: DISCONTINUED | OUTPATIENT
Start: 2019-07-06 | End: 2019-07-07

## 2019-07-06 RX ORDER — MAGNESIUM SULFATE 100 %
4 CRYSTALS MISCELLANEOUS AS NEEDED
Status: DISCONTINUED | OUTPATIENT
Start: 2019-07-06 | End: 2019-07-07

## 2019-07-06 RX ORDER — OXYCODONE AND ACETAMINOPHEN 5; 325 MG/1; MG/1
1 TABLET ORAL
Status: DISCONTINUED | OUTPATIENT
Start: 2019-07-06 | End: 2019-07-07 | Stop reason: HOSPADM

## 2019-07-06 RX ORDER — PANTOPRAZOLE SODIUM 40 MG/1
40 TABLET, DELAYED RELEASE ORAL
Status: DISCONTINUED | OUTPATIENT
Start: 2019-07-07 | End: 2019-07-07 | Stop reason: HOSPADM

## 2019-07-06 RX ORDER — FACIAL-BODY WIPES
10 EACH TOPICAL DAILY PRN
Status: DISCONTINUED | OUTPATIENT
Start: 2019-07-06 | End: 2019-07-07 | Stop reason: SDUPTHER

## 2019-07-06 RX ORDER — HEPARIN SODIUM 5000 [USP'U]/ML
5000 INJECTION, SOLUTION INTRAVENOUS; SUBCUTANEOUS EVERY 8 HOURS
Status: DISCONTINUED | OUTPATIENT
Start: 2019-07-06 | End: 2019-07-06 | Stop reason: CLARIF

## 2019-07-06 RX ORDER — SODIUM CHLORIDE AND POTASSIUM CHLORIDE .9; .15 G/100ML; G/100ML
SOLUTION INTRAVENOUS CONTINUOUS
Status: DISCONTINUED | OUTPATIENT
Start: 2019-07-06 | End: 2019-07-06

## 2019-07-06 RX ORDER — NALOXONE HYDROCHLORIDE 0.4 MG/ML
0.4 INJECTION, SOLUTION INTRAMUSCULAR; INTRAVENOUS; SUBCUTANEOUS AS NEEDED
Status: DISCONTINUED | OUTPATIENT
Start: 2019-07-06 | End: 2019-07-07 | Stop reason: HOSPADM

## 2019-07-06 RX ORDER — SODIUM CHLORIDE 0.9 % (FLUSH) 0.9 %
5-40 SYRINGE (ML) INJECTION AS NEEDED
Status: DISCONTINUED | OUTPATIENT
Start: 2019-07-06 | End: 2019-07-07 | Stop reason: SDUPTHER

## 2019-07-06 RX ADMIN — Medication 10 ML: at 16:37

## 2019-07-06 RX ADMIN — Medication 10 ML: at 16:09

## 2019-07-06 RX ADMIN — OXYCODONE AND ACETAMINOPHEN 1 TABLET: 5; 325 TABLET ORAL at 16:44

## 2019-07-06 RX ADMIN — SODIUM CHLORIDE AND POTASSIUM CHLORIDE: 9; 1.49 INJECTION, SOLUTION INTRAVENOUS at 17:26

## 2019-07-06 RX ADMIN — ONDANSETRON 4 MG: 2 INJECTION INTRAMUSCULAR; INTRAVENOUS at 14:37

## 2019-07-06 RX ADMIN — SODIUM CHLORIDE 1000 ML: 900 INJECTION, SOLUTION INTRAVENOUS at 14:58

## 2019-07-06 RX ADMIN — SODIUM CHLORIDE 1000 ML: 900 INJECTION, SOLUTION INTRAVENOUS at 13:39

## 2019-07-06 RX ADMIN — ONDANSETRON 4 MG: 2 INJECTION INTRAMUSCULAR; INTRAVENOUS at 18:25

## 2019-07-06 RX ADMIN — PROCHLORPERAZINE EDISYLATE 10 MG: 5 INJECTION INTRAMUSCULAR; INTRAVENOUS at 21:23

## 2019-07-06 RX ADMIN — SODIUM CHLORIDE 5.5 UNITS/HR: 900 INJECTION, SOLUTION INTRAVENOUS at 15:38

## 2019-07-06 RX ADMIN — Medication 5 ML: at 21:24

## 2019-07-06 RX ADMIN — DEXTROSE MONOHYDRATE, SODIUM CHLORIDE, AND POTASSIUM CHLORIDE 200 ML/HR: 50; 4.5; 1.49 INJECTION, SOLUTION INTRAVENOUS at 18:16

## 2019-07-06 NOTE — ROUTINE PROCESS
TRANSFER - IN REPORT:    Verbal report received from Niranjan Díaz  (name) on Sancho Pepper  being received from ED (unit) for routine progression of care      Report consisted of patients Situation, Background, Assessment and   Recommendations(SBAR). Information from the following report(s) SBAR, Kardex and MAR was reviewed with the receiving nurse. Opportunity for questions and clarification was provided. Assessment completed upon patients arrival to unit and care assumed. 1550- Patient received to PCU. MD paged for patient's complaints of nausea and pain. Will also discuss orders for continuous IV fluids. [place] with Dr. Orlin Cortes who is on his way to see the patient. 1738- Labs drawn and sent. Glucose less than 250. Spoke with Dr. Dior Monson and received orders to change IV fluids. 1826- Zofran given for nausea. 1900- Report given to oncoming shift.

## 2019-07-06 NOTE — H&P
Hospitalist Admission Note    NAME:  Wood Friday   :   1980   MRN:   [id number]     Date of admit: 2019    PCP: None    Assessment/Plan:     Diabetic ketoacidosis in type I diabetic POA  Recurrent nausea vomiting due to DKA POA  Possible upper respiratory infection POA  Known diabetes mellitus type 1 with prior DKA  Last admission 2019  Last hemoglobin A1c 13.8 in 2019  No PCP, uses his mother's insulin  Currently takes 46 units Lantus every day plus sliding scale insulin  Onset today of recurrent nausea/vomiting, generalized abdominal pain, cough  Notes 3year-old daughter recently had a upper respiratory infection  Admit to stepdown  Insulin drip  IV fluids with potassium  Add D5 to IV fluids when sugars less than 200  Serial labs  Ask diabetes treatment center see  Transition to SQ insulin once anion gap closes  Triggers unclear, says compliant with insulin  EKG with right axis deviation, incomplete right bundle branch block anterior T wave inversion in V1 and V2  Check serial enzymes  Check urinalysis  Increased alkaline phosphatase, check right upper quadrant ultrasound    Acute kidney injury POA creatinine 1.36  Baseline creatinine 0.5  Suspect hypovolemia  IV fluids, serial labs    Hyponatremia secondary to elevated blood sugar POA  Correct hyperglycemia, IV fluids, serial labs  should correct    Active tobacco use POA  Declined nicotine patch    Prior heavy alcohol use POA up to case of beer per day  Denies alcohol in the past 6 months    Body mass index is 20.07 kg/m². Given the patient's current clinical presentation, I have a high level of concern for decompensation if discharged from the emergency department. My assessment of this patient's clinical condition and my plan of care is as noted above.     DVT prophylaxis with heparin SQ    Code status: full code  NOK: Girlfriend    History     CHIEF COMPLAINT:  \"I start throwing up real bad this morning\"    HISTORY OF PRESENT ILLNESS:    62-year-old white male    Known type 1 diabetes mellitus with prior history of DKA    Currently does not have a PCP  Sees doctors \"when I come to the hospital\"  Last hemoglobin A1c April 2019 was 13.8  Currently uses his mother's insulin,       Lantus 46 units in the morning plus sliding scale  Currently has no insurance    3year-old daughter recently had upper respiratory infection  Patient developed cough and generalized aches  Compliant with his insulin  Blood sugars have been running mostly in the 200-300 range  This morning took his insulin dose  Afterwards he developed recurrent nausea vomiting, multiple times, not able to keep anything down  No blood or coffee grounds  No diarrhea  Mild to moderate generalized abdominal pain, back pain,   mild chest pain and sore throat with the vomiting  No fevers or chills, headache \"like cough real bad\"  Nonproductive cough    Came into the emergency room because of the recurrent nausea vomiting  Blood sugar 537, anion gap 21  Sodium 131  BUN/creatinine 26 and 1.36, last baseline creatinine was 0.5  Alkaline phosphatase increased at 258  Insulin drip started  We were called to admit the patient    Past Medical History:   Diagnosis Date    [facility]     Bipolar disorder [facility]     Depression     Diabetes ([address]     DKA, type 1 ([facility] [address] 1/27/2013    diagnosed age 23    H/O noncompliance with medical treatment, presenting hazards to health     MRSA (methicillin resistant staph aureus) culture positive     MRSA (methicillin resistant Staphylococcus aureus)     Face    Noncompliance with medication regimen     Second hand smoke exposure     Seizure ([address]     Seizures ([address] 2006 or 2007    one episode during [facility]        Past Surgical History:   Procedure Laterality Date    HX HEENT      top left wisdom tooth    HX ORTHOPAEDIC Left     wrist; [facility]    UPPER GI ENDOSCOPY,BIOPSY  11/20/2018            Social History Tobacco Use    Smoking status: Current Some Day Smoker     Packs/day: 0.10     Years: 16.00     Pack years: 1.60     Types: Cigarettes    Smokeless tobacco: Never Used   Substance Use Topics    Alcohol use: No        Family History   Problem Relation Age of Onset    Diabetes Mother     Diabetes Other         neice, type 1     2 children healthy    No Known Allergies     Prior to Admission medications    Medication Sig Start Date End Date Taking? Authorizing Provider   pantoprazole (PROTONIX) 40 mg tablet Take 1 Tab by mouth daily. 4/17/19   Adrienne, Sandraa Sessions, DO   insulin glargine (LANTUS U-100 INSULIN) 100 unit/mL injection 46 Units by SubCUTAneous route daily. Provider, Historical   insulin regular (NOVOLIN R) 100 unit/mL injection 2-10 Units by SubCUTAneous route Before breakfast, lunch, and dinner.     Provider, Historical       Review of symptoms:     POSITIVE= Bold  Negative = not bold  General:  fever, chills, sweats, generalized weakness, weight loss     loss of appetite   Eyes:    blurred vision, eye pain, double vision  ENT:    Coryza, sore throat, trouble swallowing  Respiratory:   cough, sputum, SOB  Cardiology:   chest pain, orthopnea, PND, edema  Gastrointestinal:  abdominal pain , N/V, diarrhea, constipation, melena or BRBPR  Genitourinary:  Urgency, dysuria, hematuria  Muskuloskeletal :  Joint redness, swelling or acute joint pain, myalgias  Hematology:  easy bruising, nose or gum bleeding  Dermatological: rash, ulceration  Endocrine:   Polyuria and polydipsia, heat or hold intolerance  Neurological:  Headache with coughing spells, focal motor or sensory changes     Speech difficulties, memory loss  Psychological: depression, agitation      Objective:   VITALS:    Patient Vitals for the past 24 hrs:   Temp Pulse Resp BP SpO2   07/06/19 1430 -- 100 21 115/80 100 %   07/06/19 1400 -- (!) 102 24 123/88 98 %   07/06/19 1322 98.2 °F (36.8 °C) (!) 104 20 (!) 130/93 100 %     Temp (24hrs), Av.2 °F (36.8 °C), Min:98.2 °F (36.8 °C), Max:98.2 °F (36.8 °C)      O2 Device: Room air    Wt Readings from Last 12 Encounters:   19 59.9 kg (132 lb)   19 58.3 kg (128 lb 8.5 oz)   18 64.4 kg (142 lb)   18 62.4 kg (137 lb 9.1 oz)   09/10/18 64.1 kg (141 lb 6.4 oz)   18 59.1 kg (130 lb 4.7 oz)   18 61.4 kg (135 lb 5.8 oz)   18 63.8 kg (140 lb 10.5 oz)   18 66.7 kg (147 lb 0.8 oz)   18 63.8 kg (140 lb 10.5 oz)   18 65.4 kg (144 lb 2.9 oz)   17 61 kg (134 lb 8 oz)         PHYSICAL EXAM:   General:    Alert, cooperative in no distress, actively vomiting small amounts    HEENT: Normocephalic, atraumatic    PERRL,  Sclera no icterus    Nasal mucosa without masses or discharge  Hearing intact to voice    Oropharynx without erythema or exudate  Dry MM  Neck:  No meningismus, trachea midline, no carotid bruits     Thyroid not enlarged, no nodules or tenderness  Lungs:   Clear to auscultation bilaterally. No wheezing or rales    No accessory muscle use or retractions. Heart:   Regular rate and rhythm,  no murmur or gallop. No LE edema     Dorsal pedis, Posterior tibial and radial pulses 2+ and symmetric  Abdomen:   Soft, diffusely tender. Not distended. Bowel sounds normal.     No masses, No Hepatosplenomegaly, No Rebound or guarding  Lymph nodes: No cervical or inguinal JACI  Musculoskeletal:  No Joint swelling, erythema, warmth. No Cyanosis or clubbing  Skin:      No rashes.       Not Jaundiced   No nodules or thickening    Capillary refill normal  Neurologic: Alert and oriented X 3, follows commands     Cranial nerves 2 to 12 intact    Symmetric motor strength bilaterally       LAB DATA REVIEWED:    Recent Results (from the past 12 hour(s))   GLUCOSE, POC    Collection Time: 19  1:12 PM   Result Value Ref Range    Glucose (POC) 536 (H) 65 - 100 mg/dL    Performed by Juli Hussein (PCT)    GLUCOSE, POC    Collection Time: 19  1:37 PM Result Value Ref Range    Glucose (POC) 495 (H) 65 - 100 mg/dL    Performed by Unkown     CBC WITH AUTOMATED DIFF    Collection Time: 07/06/19  1:38 PM   Result Value Ref Range    WBC 6.6 4.1 - 11.1 K/uL    RBC 5.19 4. 10 - 5.70 M/uL    HGB 16.6 12.1 - 17.0 g/dL    HCT 48.3 36.6 - 50.3 %    MCV 93.1 80.0 - 99.0 FL    MCH 32.0 26.0 - 34.0 PG    MCHC 34.4 30.0 - 36.5 g/dL    RDW 12.6 11.5 - 14.5 %    PLATELET 477 785 - 408 K/uL    MPV 10.8 8.9 - 12.9 FL    NRBC 0.0 0  WBC    ABSOLUTE NRBC 0.00 0.00 - 0.01 K/uL    NEUTROPHILS 76 (H) 32 - 75 %    LYMPHOCYTES 17 12 - 49 %    MONOCYTES 6 5 - 13 %    EOSINOPHILS 1 0 - 7 %    BASOPHILS 0 0 - 1 %    IMMATURE GRANULOCYTES 0 0.0 - 0.5 %    ABS. NEUTROPHILS 5.0 1.8 - 8.0 K/UL    ABS. LYMPHOCYTES 1.1 0.8 - 3.5 K/UL    ABS. MONOCYTES 0.4 0.0 - 1.0 K/UL    ABS. EOSINOPHILS 0.0 0.0 - 0.4 K/UL    ABS. BASOPHILS 0.0 0.0 - 0.1 K/UL    ABS. IMM. GRANS. 0.0 0.00 - 0.04 K/UL    DF AUTOMATED     METABOLIC PANEL, COMPREHENSIVE    Collection Time: 07/06/19  1:38 PM   Result Value Ref Range    Sodium 131 (L) 136 - 145 mmol/L    Potassium 4.1 3.5 - 5.1 mmol/L    Chloride 91 (L) 97 - 108 mmol/L    CO2 19 (L) 21 - 32 mmol/L    Anion gap 21 (H) 5 - 15 mmol/L    Glucose 537 (H) 65 - 100 mg/dL    BUN 26 (H) 6 - 20 MG/DL    Creatinine 1.36 (H) 0.70 - 1.30 MG/DL    BUN/Creatinine ratio 19 12 - 20      GFR est AA >60 >60 ml/min/1.73m2    GFR est non-AA 59 (L) >60 ml/min/1.73m2    Calcium 9.7 8.5 - 10.1 MG/DL    Bilirubin, total 0.6 0.2 - 1.0 MG/DL    ALT (SGPT) 73 12 - 78 U/L    AST (SGOT) 28 15 - 37 U/L    Alk.  phosphatase 258 (H) 45 - 117 U/L    Protein, total 8.0 6.4 - 8.2 g/dL    Albumin 3.7 3.5 - 5.0 g/dL    Globulin 4.3 (H) 2.0 - 4.0 g/dL    A-G Ratio 0.9 (L) 1.1 - 2.2     MAGNESIUM    Collection Time: 07/06/19  1:38 PM   Result Value Ref Range    Magnesium 2.6 (H) 1.6 - 2.4 mg/dL   PHOSPHORUS    Collection Time: 07/06/19  1:38 PM   Result Value Ref Range    Phosphorus 6.2 (H) 2.6 - 4.7 MG/DL       EKG as read by me shows NSR rate 100, RAD, no LVH  Incomplete right BBB, TWI in V1-V2    CXR read by radiology and reviewed by myself results:  Single frontal view of the chest.   Lines/tubes/surgical: Cardiac monitor leads overly the patient. Heart/mediastinum: Unremarkable. Lungs/pleura:  No focal consolidation or mass. No visualized pleural effusion or  pneumothorax. Additional Comments: Bilateral nipple adornment. IMPRESSION:  1. No radiographic evidence of acute cardiopulmonary disease. I saw the patient personally, took a history and did a complete physical exam at the bedside. I performed complex decision making in coming up with a diagnostic and treatment plan for the patient. I reviewed the patient's past medical records, current laboratory and radiology results, and actual Xray films/EKG. I have also discussed this case with the involved ED physician.     Care Plan discussed with:    Patient, ED Doc    Risk of deterioration:  High    Total Time Coordinating Admission:  65   minutes    Total Critical Care Time:         Martha Hernandez MD

## 2019-07-06 NOTE — PROGRESS NOTES
Pharmacy Clarification of Prior to Admission Medication Regimen     The patient was interviewed regarding clarification of the prior to admission medication regimen and was questioned regarding use of any other inhalers, topical products, over the counter medications, herbal medications, vitamin products or ophthalmic/nasal/otic medication use. Information Obtained From: Patient, RX Query    Pertinent Pharmacy Findings: NONE    PTA medication list was corrected to the following:     Prior to Admission Medications   Prescriptions Last Dose Informant Patient Reported? Taking?   insulin glargine (LANTUS U-100 INSULIN) 100 unit/mL injection 2019 at Unknown time Self Yes Yes   Si Units by SubCUTAneous route daily. insulin regular (NOVOLIN R) 100 unit/mL injection 2019 at Unknown time Self Yes Yes   Si-10 Units by SubCUTAneous route Before breakfast, lunch, and dinner. Blood Glucose (mg/dL)       Insulin Dose (units)              150-200                               2               201-250                               4               251-300                               6               301-350                               8               >351                                   10   pantoprazole (PROTONIX) 40 mg tablet 2019 at Unknown time Self No Yes   Sig: Take 1 Tab by mouth daily.       Facility-Administered Medications: None          Thank you,  Natalie Freeman CPhT  Medication History Pharmacy Technician

## 2019-07-06 NOTE — ED NOTES
TRANSFER - OUT REPORT:    Verbal report given to PCU nurse on [place]  being transferred to PCU for routine progression of care       Report consisted of patients Situation, Background, Assessment and   Recommendations(SBAR). Information from the following report(s) SBAR, ED Summary, [facility] and Recent Results was reviewed with the receiving nurse. Lines:   Peripheral IV 07/06/19 Right Forearm (Active)   Site Assessment Clean, dry, & intact 7/6/2019  2:59 PM   Phlebitis Assessment 0 7/6/2019  2:59 PM   Infiltration Assessment 0 7/6/2019  2:59 PM   Hub Color/Line Status Pink 7/6/2019  2:59 PM        Opportunity for questions and clarification was provided.       Patient transported with:   Registered Nurse

## 2019-07-07 ENCOUNTER — APPOINTMENT (OUTPATIENT)
Dept: ULTRASOUND IMAGING | Age: 39
DRG: 638 | End: 2019-07-07
Attending: INTERNAL MEDICINE
Payer: SELF-PAY

## 2019-07-07 VITALS
BODY MASS INDEX: 21.48 KG/M2 | HEIGHT: 68 IN | WEIGHT: 141.76 LBS | TEMPERATURE: 98.9 F | DIASTOLIC BLOOD PRESSURE: 66 MMHG | SYSTOLIC BLOOD PRESSURE: 103 MMHG | HEART RATE: 86 BPM | OXYGEN SATURATION: 97 % | RESPIRATION RATE: 18 BRPM

## 2019-07-07 LAB
ADMINISTERED INITIALS, ADMINIT: NORMAL
ALBUMIN SERPL-MCNC: 2.7 G/DL (ref 3.5–5)
ALBUMIN/GLOB SERPL: 0.9 {RATIO} (ref 1.1–2.2)
ALP SERPL-CCNC: 166 U/L (ref 45–117)
ALT SERPL-CCNC: 47 U/L (ref 12–78)
ANION GAP SERPL CALC-SCNC: 6 MMOL/L (ref 5–15)
ANION GAP SERPL CALC-SCNC: 6 MMOL/L (ref 5–15)
AST SERPL-CCNC: 19 U/L (ref 15–37)
ATRIAL RATE: 100 BPM
BASOPHILS # BLD: 0 K/UL (ref 0–0.1)
BASOPHILS NFR BLD: 0 % (ref 0–1)
BILIRUB SERPL-MCNC: 0.3 MG/DL (ref 0.2–1)
BUN SERPL-MCNC: 18 MG/DL (ref 6–20)
BUN SERPL-MCNC: 19 MG/DL (ref 6–20)
BUN/CREAT SERPL: 21 (ref 12–20)
BUN/CREAT SERPL: 23 (ref 12–20)
CALCIUM SERPL-MCNC: 8.1 MG/DL (ref 8.5–10.1)
CALCIUM SERPL-MCNC: 8.1 MG/DL (ref 8.5–10.1)
CALCULATED P AXIS, ECG09: 82 DEGREES
CALCULATED R AXIS, ECG10: 113 DEGREES
CALCULATED T AXIS, ECG11: 62 DEGREES
CHLORIDE SERPL-SCNC: 103 MMOL/L (ref 97–108)
CHLORIDE SERPL-SCNC: 104 MMOL/L (ref 97–108)
CK MB CFR SERPL CALC: 1.9 % (ref 0–2.5)
CK MB SERPL-MCNC: 1.1 NG/ML (ref 5–25)
CK SERPL-CCNC: 57 U/L (ref 39–308)
CO2 SERPL-SCNC: 26 MMOL/L (ref 21–32)
CO2 SERPL-SCNC: 26 MMOL/L (ref 21–32)
CREAT SERPL-MCNC: 0.78 MG/DL (ref 0.7–1.3)
CREAT SERPL-MCNC: 0.89 MG/DL (ref 0.7–1.3)
D50 ADMINISTERED, D50ADM: 0 ML
D50 ORDER, D50ORD: 0 ML
DIAGNOSIS, 93000: NORMAL
DIFFERENTIAL METHOD BLD: NORMAL
EOSINOPHIL # BLD: 0.1 K/UL (ref 0–0.4)
EOSINOPHIL NFR BLD: 1 % (ref 0–7)
ERYTHROCYTE [DISTWIDTH] IN BLOOD BY AUTOMATED COUNT: 12.6 % (ref 11.5–14.5)
GLOBULIN SER CALC-MCNC: 3.1 G/DL (ref 2–4)
GLSCOM COMMENTS: NORMAL
GLUCOSE BLD STRIP.AUTO-MCNC: 104 MG/DL (ref 65–100)
GLUCOSE BLD STRIP.AUTO-MCNC: 117 MG/DL (ref 65–100)
GLUCOSE BLD STRIP.AUTO-MCNC: 117 MG/DL (ref 65–100)
GLUCOSE BLD STRIP.AUTO-MCNC: 122 MG/DL (ref 65–100)
GLUCOSE BLD STRIP.AUTO-MCNC: 126 MG/DL (ref 65–100)
GLUCOSE BLD STRIP.AUTO-MCNC: 135 MG/DL (ref 65–100)
GLUCOSE BLD STRIP.AUTO-MCNC: 145 MG/DL (ref 65–100)
GLUCOSE BLD STRIP.AUTO-MCNC: 149 MG/DL (ref 65–100)
GLUCOSE BLD STRIP.AUTO-MCNC: 97 MG/DL (ref 65–100)
GLUCOSE SERPL-MCNC: 119 MG/DL (ref 65–100)
GLUCOSE SERPL-MCNC: 144 MG/DL (ref 65–100)
GLUCOSE, GLC: 104 MG/DL
GLUCOSE, GLC: 117 MG/DL
GLUCOSE, GLC: 117 MG/DL
GLUCOSE, GLC: 122 MG/DL
GLUCOSE, GLC: 126 MG/DL
GLUCOSE, GLC: 135 MG/DL
GLUCOSE, GLC: 145 MG/DL
HCT VFR BLD AUTO: 37.8 % (ref 36.6–50.3)
HGB BLD-MCNC: 13.3 G/DL (ref 12.1–17)
HIGH TARGET, HITG: 250 MG/DL
IMM GRANULOCYTES # BLD AUTO: 0 K/UL (ref 0–0.04)
IMM GRANULOCYTES NFR BLD AUTO: 0 % (ref 0–0.5)
INSULIN ADMINSTERED, INSADM: 0 UNITS/HOUR
INSULIN ORDER, INSORD: 0 UNITS/HOUR
LOW TARGET, LOT: 150 MG/DL
LYMPHOCYTES # BLD: 1.2 K/UL (ref 0.8–3.5)
LYMPHOCYTES NFR BLD: 16 % (ref 12–49)
MAGNESIUM SERPL-MCNC: 1.7 MG/DL (ref 1.6–2.4)
MAGNESIUM SERPL-MCNC: 1.8 MG/DL (ref 1.6–2.4)
MCH RBC QN AUTO: 31.9 PG (ref 26–34)
MCHC RBC AUTO-ENTMCNC: 35.2 G/DL (ref 30–36.5)
MCV RBC AUTO: 90.6 FL (ref 80–99)
MINUTES UNTIL NEXT BG, NBG: 60 MIN
MONOCYTES # BLD: 0.8 K/UL (ref 0–1)
MONOCYTES NFR BLD: 11 % (ref 5–13)
MULTIPLIER, MUL: 0
NEUTS SEG # BLD: 5.1 K/UL (ref 1.8–8)
NEUTS SEG NFR BLD: 71 % (ref 32–75)
NRBC # BLD: 0 K/UL (ref 0–0.01)
NRBC BLD-RTO: 0 PER 100 WBC
ORDER INITIALS, ORDINIT: NORMAL
P-R INTERVAL, ECG05: 152 MS
PLATELET # BLD AUTO: 250 K/UL (ref 150–400)
PMV BLD AUTO: 10.2 FL (ref 8.9–12.9)
POTASSIUM SERPL-SCNC: 3.6 MMOL/L (ref 3.5–5.1)
POTASSIUM SERPL-SCNC: 3.6 MMOL/L (ref 3.5–5.1)
PROT SERPL-MCNC: 5.8 G/DL (ref 6.4–8.2)
Q-T INTERVAL, ECG07: 362 MS
QRS DURATION, ECG06: 92 MS
QTC CALCULATION (BEZET), ECG08: 466 MS
RBC # BLD AUTO: 4.17 M/UL (ref 4.1–5.7)
SERVICE CMNT-IMP: ABNORMAL
SERVICE CMNT-IMP: NORMAL
SODIUM SERPL-SCNC: 135 MMOL/L (ref 136–145)
SODIUM SERPL-SCNC: 136 MMOL/L (ref 136–145)
TROPONIN I SERPL-MCNC: <0.05 NG/ML
VENTRICULAR RATE, ECG03: 100 BPM
WBC # BLD AUTO: 7.2 K/UL (ref 4.1–11.1)

## 2019-07-07 PROCEDURE — 74011250637 HC RX REV CODE- 250/637: Performed by: INTERNAL MEDICINE

## 2019-07-07 PROCEDURE — 84484 ASSAY OF TROPONIN QUANT: CPT

## 2019-07-07 PROCEDURE — 83735 ASSAY OF MAGNESIUM: CPT

## 2019-07-07 PROCEDURE — 74011250636 HC RX REV CODE- 250/636: Performed by: INTERNAL MEDICINE

## 2019-07-07 PROCEDURE — 82962 GLUCOSE BLOOD TEST: CPT

## 2019-07-07 PROCEDURE — 74011250636 HC RX REV CODE- 250/636: Performed by: GENERAL ACUTE CARE HOSPITAL

## 2019-07-07 PROCEDURE — 80053 COMPREHEN METABOLIC PANEL: CPT

## 2019-07-07 PROCEDURE — 76705 ECHO EXAM OF ABDOMEN: CPT

## 2019-07-07 PROCEDURE — 82550 ASSAY OF CK (CPK): CPT

## 2019-07-07 PROCEDURE — 36415 COLL VENOUS BLD VENIPUNCTURE: CPT

## 2019-07-07 PROCEDURE — 74011636637 HC RX REV CODE- 636/637: Performed by: INTERNAL MEDICINE

## 2019-07-07 PROCEDURE — 85025 COMPLETE CBC W/AUTO DIFF WBC: CPT

## 2019-07-07 RX ORDER — BENZONATATE 100 MG/1
100 CAPSULE ORAL
Status: DISCONTINUED | OUTPATIENT
Start: 2019-07-07 | End: 2019-07-07 | Stop reason: HOSPADM

## 2019-07-07 RX ORDER — DEXTROSE MONOHYDRATE 100 MG/ML
125-250 INJECTION, SOLUTION INTRAVENOUS AS NEEDED
Status: DISCONTINUED | OUTPATIENT
Start: 2019-07-07 | End: 2019-07-07 | Stop reason: HOSPADM

## 2019-07-07 RX ORDER — BENZONATATE 100 MG/1
100 CAPSULE ORAL
Qty: 20 CAP | Refills: 0 | Status: SHIPPED | OUTPATIENT
Start: 2019-07-07 | End: 2019-07-14

## 2019-07-07 RX ORDER — PEN NEEDLE, DIABETIC 29 G X1/2"
NEEDLE, DISPOSABLE MISCELLANEOUS
Qty: 60 PEN NEEDLE | Refills: 0 | Status: SHIPPED | OUTPATIENT
Start: 2019-07-07 | End: 2019-10-29

## 2019-07-07 RX ORDER — OXYCODONE AND ACETAMINOPHEN 5; 325 MG/1; MG/1
1 TABLET ORAL
Qty: 20 TAB | Refills: 0 | Status: SHIPPED | OUTPATIENT
Start: 2019-07-07 | End: 2019-07-12

## 2019-07-07 RX ORDER — INSULIN LISPRO 100 [IU]/ML
INJECTION, SOLUTION INTRAVENOUS; SUBCUTANEOUS
Status: DISCONTINUED | OUTPATIENT
Start: 2019-07-07 | End: 2019-07-07 | Stop reason: HOSPADM

## 2019-07-07 RX ORDER — MAGNESIUM SULFATE 100 %
4 CRYSTALS MISCELLANEOUS AS NEEDED
Status: DISCONTINUED | OUTPATIENT
Start: 2019-07-07 | End: 2019-07-07 | Stop reason: HOSPADM

## 2019-07-07 RX ORDER — INSULIN GLARGINE 100 [IU]/ML
46 INJECTION, SOLUTION SUBCUTANEOUS DAILY
Status: DISCONTINUED | OUTPATIENT
Start: 2019-07-07 | End: 2019-07-07 | Stop reason: HOSPADM

## 2019-07-07 RX ADMIN — INSULIN GLARGINE 46 UNITS: 100 INJECTION, SOLUTION SUBCUTANEOUS at 08:13

## 2019-07-07 RX ADMIN — OXYCODONE AND ACETAMINOPHEN 1 TABLET: 5; 325 TABLET ORAL at 09:28

## 2019-07-07 RX ADMIN — ENOXAPARIN SODIUM 40 MG: 40 INJECTION SUBCUTANEOUS at 08:13

## 2019-07-07 RX ADMIN — DEXTROSE MONOHYDRATE, SODIUM CHLORIDE, AND POTASSIUM CHLORIDE 200 ML/HR: 50; 4.5; 1.49 INJECTION, SOLUTION INTRAVENOUS at 00:50

## 2019-07-07 RX ADMIN — DEXTROSE MONOHYDRATE, SODIUM CHLORIDE, AND POTASSIUM CHLORIDE 200 ML/HR: 50; 4.5; 1.49 INJECTION, SOLUTION INTRAVENOUS at 05:59

## 2019-07-07 RX ADMIN — Medication 5 ML: at 05:59

## 2019-07-07 RX ADMIN — PANTOPRAZOLE SODIUM 40 MG: 40 TABLET, DELAYED RELEASE ORAL at 08:13

## 2019-07-07 RX ADMIN — BENZONATATE 100 MG: 100 CAPSULE ORAL at 09:32

## 2019-07-07 NOTE — DISCHARGE INSTRUCTIONS
Patient Education        Learning About Diabetes Food Guidelines  Your Care Instructions    Meal planning is important to manage diabetes. It helps keep your blood sugar at a target level (which you set with your doctor). You don't have to eat special foods. You can eat what your family eats, including sweets once in a while. But you do have to pay attention to how often you eat and how much you eat of certain foods. You may want to work with a dietitian or a certified diabetes educator (CDE) to help you plan meals and snacks. A dietitian or CDE can also help you lose weight if that is one of your goals. What should you know about eating carbs? Managing the amount of carbohydrate (carbs) you eat is an important part of healthy meals when you have diabetes. Carbohydrate is found in many foods. · Learn which foods have carbs. And learn the amounts of carbs in different foods. ? Bread, cereal, pasta, and rice have about 15 grams of carbs in a serving. A serving is 1 slice of bread (1 ounce), ½ cup of cooked cereal, or 1/3 cup of cooked pasta or rice. ? Fruits have 15 grams of carbs in a serving. A serving is 1 small fresh fruit, such as an apple or orange; ½ of a banana; ½ cup of cooked or canned fruit; ½ cup of fruit juice; 1 cup of melon or raspberries; or 2 tablespoons of dried fruit. ? Milk and no-sugar-added yogurt have 15 grams of carbs in a serving. A serving is 1 cup of milk or 2/3 cup of no-sugar-added yogurt. ? Starchy vegetables have 15 grams of carbs in a serving. A serving is ½ cup of mashed potatoes or sweet potato; 1 cup winter squash; ½ of a small baked potato; ½ cup of cooked beans; or ½ cup cooked corn or green peas. · Learn how much carbs to eat each day and at each meal. A dietitian or CDE can teach you how to keep track of the amount of carbs you eat. This is called carbohydrate counting. · If you are not sure how to count carbohydrate grams, use the Plate Method to plan meals.  It is a good, quick way to make sure that you have a balanced meal. It also helps you spread carbs throughout the day. ? Divide your plate by types of foods. Put non-starchy vegetables on half the plate, meat or other protein food on one-quarter of the plate, and a grain or starchy vegetable in the final quarter of the plate. To this you can add a small piece of fruit and 1 cup of milk or yogurt, depending on how many carbs you are supposed to eat at a meal.  · Try to eat about the same amount of carbs at each meal. Do not \"save up\" your daily allowance of carbs to eat at one meal.  · Proteins have very little or no carbs per serving. Examples of proteins are beef, chicken, turkey, fish, eggs, tofu, cheese, cottage cheese, and peanut butter. A serving size of meat is 3 ounces, which is about the size of a deck of cards. Examples of meat substitute serving sizes (equal to 1 ounce of meat) are 1/4 cup of cottage cheese, 1 egg, 1 tablespoon of peanut butter, and ½ cup of tofu. How can you eat out and still eat healthy? · Learn to estimate the serving sizes of foods that have carbohydrate. If you measure food at home, it will be easier to estimate the amount in a serving of restaurant food. · If the meal you order has too much carbohydrate (such as potatoes, corn, or baked beans), ask to have a low-carbohydrate food instead. Ask for a salad or green vegetables. · If you use insulin, check your blood sugar before and after eating out to help you plan how much to eat in the future. · If you eat more carbohydrate at a meal than you had planned, take a walk or do other exercise. This will help lower your blood sugar. What else should you know? · Limit saturated fat, such as the fat from meat and dairy products. This is a healthy choice because people who have diabetes are at higher risk of heart disease. So choose lean cuts of meat and nonfat or low-fat dairy products.  Use olive or canola oil instead of butter or shortening when cooking. · Don't skip meals. Your blood sugar may drop too low if you skip meals and take insulin or certain medicines for diabetes. · Check with your doctor before you drink alcohol. Alcohol can cause your blood sugar to drop too low. Alcohol can also cause a bad reaction if you take certain diabetes medicines. Follow-up care is a key part of your treatment and safety. Be sure to make and go to all appointments, and call your doctor if you are having problems. It's also a good idea to know your test results and keep a list of the medicines you take. Where can you learn more? Go to http://dwaine-sanjeev.info/. Enter D129 in the search box to learn more about \"Learning About Diabetes Food Guidelines. \"  Current as of: July 25, 2018  Content Version: 11.9  © 0385-7212 [employer]. Care instructions adapted under license by [employer] (which disclaims liability or warranty for this information). If you have questions about a medical condition or this instruction, always ask your healthcare professional. [place] [address] any warranty or liability for your use of this information. Patient Education        Diabetic Ketoacidosis (DKA): Care Instructions  Your Care Instructions  Diabetic ketoacidosis (DKA) happens when the body does not have enough insulin and can't get the sugar it needs for energy. When the body can't use sugar for energy, it starts to use fat for energy. This process makes fatty acids called ketones. The ketones build up in the blood and change the chemical balance in your body. This problem can be very dangerous and needs to be treated. Without treatment, it can lead to a coma or death. DKA occurs most often in people with type 1 diabetes. But people with type 2 diabetes also can get it. DKA can be caused by many things. It can happen if you don't take enough insulin.  It can also happen if you have an infection or illness like the flu. Sometimes it happens if you are very dehydrated. DKA can only be treated with insulin and fluids. These are often given in a vein (IV). Follow-up care is a key part of your treatment and safety. Be sure to make and go to all appointments, and call your doctor if you are having problems. It's also a good idea to know your test results and keep a list of the medicines you take. How can you care for yourself at home? To reduce your chance of ketoacidosis:  · Take your insulin and other diabetes medicines on time and in the right dose. ? If an infection caused your DKA and your doctor prescribed antibiotics, take them as directed. Do not stop taking them just because you feel better. You need to take the full course of antibiotics. · Test your blood sugar before meals and at bedtime or as often as your doctor advises. This is the best way to know when your blood sugar is high so you can treat it early. Watching for symptoms is not as helpful. This is because you may not have symptoms until your blood sugar is very high. Or you may not notice them. · Teach others at work and at home how to check your blood sugar. Make sure that someone else knows how do it in case you can't. · Wear or carry medical identification at all times. This is very important in case you are too sick or injured to speak for yourself. · Talk to your doctor about when you can start to exercise again. · Eat regular meals that spread your calories and carbohydrate throughout the day. This will help keep your blood sugar steady. · When you are sick:  ? Take your insulin and diabetes medicines. This is important even if you are vomiting and having trouble eating or drinking. Your blood sugar may go up because you are sick. If you are eating less than normal, you may need to change your dose of insulin. Talk with your doctor about a plan when you are well. Then you will know what to do when you are sick.   ? Drink extra fluids to prevent dehydration. These include water, broth, and sugar-free drinks. If you don't drink enough, the insulin from your shot may not get into your blood. So your blood sugar may go up. ? Try to eat as you normally do, with a focus on healthy food choices. ? Check your blood sugar at least every 3 to 4 hours. Check it more often if it's rising fast. If your doctor has told you to take an extra insulin dose for high blood sugar levels (for example, above 240 mg/dL) be sure to take the right amount. If you're not sure how much to take, call your doctor. ? Check your temperature and pulse often. If your temperature goes up, call your doctor. You may be getting worse. ? If you take insulin, check your urine or blood for ketones, especially when you have high blood sugar (for example, above 240 mg/dL). Call your doctor if your ketone level is moderate or high. If you know your blood sugar is high, treat it before it gets worse. · If you missed your usual dose of insulin or other diabetes medicine, take the missed dose or take the amount your doctor told you to take if this happens. · If you and your doctor decide on a dose of extra-fast-acting insulin, give yourself the right dose. If you take insulin and your doctor has not told you how much fast-acting insulin to take based on your blood sugar level, call your doctor. · Drink extra water or sugar-free drinks to prevent dehydration. · Wait 30 minutes after you take extra insulin or missed medicines. Then check your blood sugar again. · If symptoms of high blood sugar get worse or your blood sugar level keeps rising, call your doctor. If you start to feel sleepy or confused, call 911. When should you call for help? Call 911 anytime you think you may need emergency care.  For example, call if:    · You passed out (lost consciousness).     · You are confused or cannot think clearly.     · Your blood sugar is very high or very low.    Watch closely for changes in your health, and be sure to contact your doctor if:    · Your blood sugar stays outside the level your doctor set for you.     · You have any problems. Where can you learn more? Go to http://dwaine-sanjeev.info/. Dasia Lucilleon in the search box to learn more about \"Diabetic Ketoacidosis (DKA): Care Instructions. \"  Current as of: 2018  Content Version: 11.9   [employer]. Care instructions adapted under license by [employer] (which disclaims liability or warranty for this information). If you have questions about a medical condition or this instruction, always ask your healthcare professional. [place] [address] any warranty or liability for your use of this information. HOSPITALIST DISCHARGE INSTRUCTIONS    NAME: Alejandra Blanco   :  1980   MRN:  [id number]     Date/Time:  2019 1:31 PM    ADMIT DATE: 2019   DISCHARGE DATE: 2019         · It is important that you take the medication exactly as they are prescribed. · Keep your medication in the bottles provided by the pharmacist and keep a list of the medication names, dosages, and times to be taken in your wallet. · Do not take other medications without consulting your doctor. What to do at Home    Recommended diet:  Diabetic Diet    Recommended activity: Activity as tolerated      If you have questions regarding the hospital related prescriptions or hospital related issues please call [facility] at [phone].  You can always direct your questions to your primary care doctor if you are unable to reach your hospital physician; your PCP works as an extension of your hospital doctor just like your hospital doctor is an extension of your PCP for your time at the [facility], [facility])    If you experience any of the following symptoms then please call your primary care physician or return to the emergency room if you cannot get hold of your doctor:    Fever, chills, nausea, vomiting, or persistent diarrhea  Worsening weakness or new problems with your speech or balance  Dark stools or visible blood in your stools  New Leg swelling or shortness of breath as these could be signs of a clot    Additional Instructions:      Bring these papers with you to your follow up appointments. The papers will help your doctors be sure to continue the care plan from the hospital.              Information obtained by :  I understand that if any problems occur once I am at home I am to contact my physician. I understand and acknowledge receipt of the instructions indicated above.                                                                                                                                            Physician's or R.N.'s Signature                                                                  Date/Time                                                                                                                                              Patient or Representative Signature

## 2019-07-07 NOTE — PROGRESS NOTES
Reason for Admission:  DKA                   RRAT Score:   8                  Plan for utilizing home health: No PT/OT needs, is not homebound and currently works full-time. Current Advanced Directive/Advance Care Plan: Full Code - ACP not on file at this time. Mother is legal NOK. Transition of Care Plan: Follow-up Care Appointment (PCP)    Pt is a 40 yo  male admitted on 7/6/19 for DKA. Lives in a one-story [place] ([address]) with mother (Veronica Sanchez, [phone]) in [place]. Pt also has a supportive girlfriend (Ed Garza, [phone]) and two children. Pt is independent in ADLs/IADLs to include driving. Pt works full time for [employer]. No hx of HH, SNF, or acute inpatient rehab. Pt current is uninsured - has been provided [facility] Care Card applications and information for Medication applications several times in the past. Offered to provide applications and information again. Pt reports that since he just started his new job, he plans to get insurance through them during open enrollment. Preferred Rx is [facility] ([address]). Pt has been scheduled for new patient PCP appointments several times in the past ([address], etc). CM notified pt will need new PCP to manage diabetes. CM notified CM specialist who was able to schedule with [employer] provider at [address] with Arthur Reddy NP on 7/12 @ 11:20AM. CM discussed this with pt - recommended adequate follow-up with primary care provider and call to reschedule appointment as needed. Pt verbalized understanding. Girlfriend to provide discharge transportation home this afternoon. Care Management Interventions  PCP Verified by CM: Yes(New patient appnt with Arthur Reddy NP)  Palliative Care Criteria Met (RRAT>21 & CHF Dx)?: No  Mode of Transport at Discharge:  Other (see comment)(by private vehicle with girlfriend)  Transition of Care Consult (CM Consult): Discharge Planning  Discharge Durable Medical Equipment: No(has glucometer at home)  Health Maintenance Reviewed: Yes  Physical Therapy Consult: No  Occupational Therapy Consult: No  Speech Therapy Consult: No  Current Support Network: Relative's Home, Other(Lives in [place] ([address]) [facility] with mother in [place])  Confirm Follow Up Transport: [facility] discussed with Pt/Family/Caregiver: Yes  Discharge Location  Discharge Placement: Home with family assistance    KAVEH Levin Supervisee in Social Work, [address] [facility]  [phone]

## 2019-07-07 NOTE — ED PROVIDER NOTES
EMERGENCY DEPARTMENT HISTORY AND PHYSICAL EXAM      Date: 7/6/2019  Patient Name: Janet Bustillo    History of Presenting Illness     Chief Complaint   Patient presents with    Vomiting     unable to tolerate po intake with multiple episodes of vomiting since 0500 this morning    High Blood Sugar     536 in triage. pt did take his insulin this morning       History Provided By: Patient    HPI: Janet Bustillo, 41 y.o. male with PMHx significant for diabetes, GERD, presents by POV to the ED with cc of nausea vomiting elevated blood sugar. Patient states he has been doing well however most recently his daughter had gotten ill with an upper respiratory infection and he had been having some of the same symptoms. Patient states he has been taking his insulin as prescribed has not missed any doses. Patient states starting this morning he was feeling nauseated and had throwing up at home able to keep anything down. In addition patient noticed that his blood sugar was elevated and presented himself to the emergency department for further evaluation and treatment. Patient denies any fever. Patient has had some nasal congestion. Patient has not had any cough or shortness of breath. Patient denies any chest pain or palpitations. Patient states he is nauseated has been vomiting. Patient describes mild diffuse abdominal cramping rated a 6 out of 10 with no alleviating or exacerbating factors. Patient denies any blood in his vomit or stool. Patient states he has noticed increased thirst as well as increased urinary put. There are no other complaints, changes, or physical findings at this time. PCP: None    No current facility-administered medications on file prior to encounter. Current Outpatient Medications on File Prior to Encounter   Medication Sig Dispense Refill    pantoprazole (PROTONIX) 40 mg tablet Take 1 Tab by mouth daily.  30 Tab 5    insulin glargine (LANTUS U-100 INSULIN) 100 unit/mL injection 46 Units by SubCUTAneous route daily.  insulin regular (NOVOLIN R) 100 unit/mL injection 2-10 Units by SubCUTAneous route Before breakfast, lunch, and dinner. Blood Glucose (mg/dL)       Insulin Dose (units)              150-200                               2               201-250                               4               251-300                               6               301-350                               8               >351                                   10         Past History     Past Medical History:  Past Medical History:   Diagnosis Date    [facility]     Bipolar disorder ([facility]     Depression     Diabetes ([facility]     DKA, type 1 ([facility] 1/27/2013    diagnosed age 23    H/O noncompliance with medical treatment, presenting hazards to health     MRSA (methicillin resistant staph aureus) culture positive     MRSA (methicillin resistant Staphylococcus aureus)     Face    Noncompliance with medication regimen     Second hand smoke exposure     Seizure ([facility]     Seizures ([facility] 2006 or 2007    one episode during [facility]       Past Surgical History:  Past Surgical History:   Procedure Laterality Date    HX HEENT      top left wisdom tooth    HX ORTHOPAEDIC Left     wrist; [facility]    UPPER GI ENDOSCOPY,BIOPSY  11/20/2018            Family History:  Family History   Problem Relation Age of Onset    Diabetes Mother     Diabetes Other         neice, type 1        Social History:  Social History     Tobacco Use    Smoking status: Current Some Day Smoker     Packs/day: 0.10     Years: 16.00     Pack years: 1.60     Types: Cigarettes    Smokeless tobacco: Never Used   Substance Use Topics    Alcohol use: No    Drug use: No       Allergies:  No Known Allergies      Review of Systems   Review of Systems   Constitutional: Positive for fatigue. Negative for appetite change, chills and fever.    HENT: Negative for congestion, rhinorrhea, sinus pressure and sore throat. Dry mouth      Eyes: Negative. Respiratory: Negative. Negative for cough, choking, chest tightness, shortness of breath and wheezing. Cardiovascular: Negative. Negative for chest pain, palpitations and leg swelling. Gastrointestinal: Positive for abdominal pain (cramping), nausea and vomiting. Negative for constipation and diarrhea. Endocrine: Positive for polydipsia and polyuria. Genitourinary: Negative. Negative for difficulty urinating, dysuria, flank pain and urgency. Musculoskeletal: Negative. Skin: Negative. Neurological: Negative. Negative for dizziness, speech difficulty, weakness, light-headedness, numbness and headaches. Psychiatric/Behavioral: Negative. All other systems reviewed and are negative. Physical Exam   Physical Exam   Constitutional: He is oriented to person, place, and time. He appears well-developed and well-nourished. No distress. Appears weak and fatigued      HENT:   Head: Normocephalic and atraumatic. Mouth/Throat: No oropharyngeal exudate. Dry mucous membranes      Eyes: Pupils are equal, round, and reactive to light. Conjunctivae and EOM are normal.   Neck: Normal range of motion. Neck supple. No JVD present. No tracheal deviation present. Cardiovascular: Regular rhythm, normal heart sounds and intact distal pulses. No murmur heard. Tachycardic   Pulmonary/Chest: Effort normal and breath sounds normal. No stridor. No respiratory distress. He has no wheezes. He has no rales. He exhibits no tenderness. Abdominal: Soft. He exhibits no distension. There is no tenderness. There is no rebound and no guarding. Musculoskeletal: Normal range of motion. He exhibits no edema or tenderness. Neurological: He is alert and oriented to person, place, and time. No cranial nerve deficit. No gross motor or sensory deficits    Skin: Skin is warm and dry. He is not diaphoretic.    Psychiatric: His behavior is normal.   Flat affect   Nursing note and vitals reviewed.       Diagnostic Study Results     Labs -     Recent Results (from the past 12 hour(s))   GLUCOSE, POC    Collection Time: 07/07/19  2:53 AM   Result Value Ref Range    Glucose (POC) 104 (H) 65 - 100 mg/dL    Performed by Hiro Branch    Collection Time: 07/07/19  2:54 AM   Result Value Ref Range    Glucose 104 mg/dL    Insulin order 0.0 units/hour    Insulin adminstered 0.0 units/hour    Multiplier 0.000     Low target 150 mg/dL    High target 250 mg/dL    D50 order 0.0 ml    D50 administered 0.00 ml    Minutes until next BG 60 min    Order initials ak     Administered initials ak     GLSCOM Comments     GLUCOSE, POC    Collection Time: 07/07/19  4:02 AM   Result Value Ref Range    Glucose (POC) 122 (H) 65 - 100 mg/dL    Performed by Emily Cedeño    Collection Time: 07/07/19  4:03 AM   Result Value Ref Range    Glucose 122 mg/dL    Insulin order 0.0 units/hour    Insulin adminstered 0.0 units/hour    Multiplier 0.000     Low target 150 mg/dL    High target 250 mg/dL    D50 order 0.0 ml    D50 administered 0.00 ml    Minutes until next BG 60 min    Order initials dct     Administered initials dct     GLSCOM Comments     GLUCOSE, POC    Collection Time: 07/07/19  4:58 AM   Result Value Ref Range    Glucose (POC) 117 (H) 65 - 100 mg/dL    Performed by Emily Cedeño    Collection Time: 07/07/19  4:58 AM   Result Value Ref Range    Glucose 117 mg/dL    Insulin order 0.0 units/hour    Insulin adminstered 0.0 units/hour    Multiplier 0.000     Low target 150 mg/dL    High target 250 mg/dL    D50 order 0.0 ml    D50 administered 0.00 ml    Minutes until next BG 60 min    Order initials dct     Administered initials dct     GLSCOM Comments     GLUCOSE, POC    Collection Time: 07/07/19  5:58 AM   Result Value Ref Range    Glucose (POC) 135 (H) 65 - 100 mg/dL    Performed by Emily Cedeño    Collection Time: 07/07/19  5:58 AM Result Value Ref Range    Glucose 135 mg/dL    Insulin order 0.0 units/hour    Insulin adminstered 0.0 units/hour    Multiplier 0.000     Low target 150 mg/dL    High target 250 mg/dL    D50 order 0.0 ml    D50 administered 0.00 ml    Minutes until next BG 60 min    Order initials dct     Administered initials dct     GLSCOM Comments     CBC WITH AUTOMATED DIFF    Collection Time: 07/07/19  6:30 AM   Result Value Ref Range    WBC 7.2 4.1 - 11.1 K/uL    RBC 4.17 4.10 - 5.70 M/uL    HGB 13.3 12.1 - 17.0 g/dL    HCT 37.8 36.6 - 50.3 %    MCV 90.6 80.0 - 99.0 FL    MCH 31.9 26.0 - 34.0 PG    MCHC 35.2 30.0 - 36.5 g/dL    RDW 12.6 11.5 - 14.5 %    PLATELET 088 696 - 519 K/uL    MPV 10.2 8.9 - 12.9 FL    NRBC 0.0 0  WBC    ABSOLUTE NRBC 0.00 0.00 - 0.01 K/uL    NEUTROPHILS 71 32 - 75 %    LYMPHOCYTES 16 12 - 49 %    MONOCYTES 11 5 - 13 %    EOSINOPHILS 1 0 - 7 %    BASOPHILS 0 0 - 1 %    IMMATURE GRANULOCYTES 0 0.0 - 0.5 %    ABS. NEUTROPHILS 5.1 1.8 - 8.0 K/UL    ABS. LYMPHOCYTES 1.2 0.8 - 3.5 K/UL    ABS. MONOCYTES 0.8 0.0 - 1.0 K/UL    ABS. EOSINOPHILS 0.1 0.0 - 0.4 K/UL    ABS. BASOPHILS 0.0 0.0 - 0.1 K/UL    ABS. IMM.  GRANS. 0.0 0.00 - 0.04 K/UL    DF AUTOMATED     METABOLIC PANEL, BASIC    Collection Time: 07/07/19  6:30 AM   Result Value Ref Range    Sodium 135 (L) 136 - 145 mmol/L    Potassium 3.6 3.5 - 5.1 mmol/L    Chloride 103 97 - 108 mmol/L    CO2 26 21 - 32 mmol/L    Anion gap 6 5 - 15 mmol/L    Glucose 144 (H) 65 - 100 mg/dL    BUN 18 6 - 20 MG/DL    Creatinine 0.78 0.70 - 1.30 MG/DL    BUN/Creatinine ratio 23 (H) 12 - 20      GFR est AA >60 >60 ml/min/1.73m2    GFR est non-AA >60 >60 ml/min/1.73m2    Calcium 8.1 (L) 8.5 - 10.1 MG/DL   MAGNESIUM    Collection Time: 07/07/19  6:30 AM   Result Value Ref Range    Magnesium 1.8 1.6 - 2.4 mg/dL   GLUCOSE, POC    Collection Time: 07/07/19  6:57 AM   Result Value Ref Range    Glucose (POC) 145 (H) 65 - 100 mg/dL    Performed by Rekha Rosales GLUCOSTABILIZER    Collection Time: 07/07/19  6:57 AM   Result Value Ref Range    Glucose 145 mg/dL    Insulin order 0.0 units/hour    Insulin adminstered 0.0 units/hour    Multiplier 0.000     Low target 150 mg/dL    High target 250 mg/dL    D50 order 0.0 ml    D50 administered 0.00 ml    Minutes until next BG 60 min    Order initials dct     Administered initials dct     GLSCOM Comments     GLUCOSE, POC    Collection Time: 07/07/19  7:56 AM   Result Value Ref Range    Glucose (POC) 149 (H) 65 - 100 mg/dL    Performed by Tricia Barton    GLUCOSE, POC    Collection Time: 07/07/19 11:50 AM   Result Value Ref Range    Glucose (POC) 97 65 - 100 mg/dL    Performed by Tricia Barton        Radiologic Studies -   [facility]   Final Result   IMPRESSION:    Normal right upper quadrant ultrasound examination. Pancreas obscured by gas. XR CHEST PORT   Final Result   IMPRESSION:   1. No radiographic evidence of acute cardiopulmonary disease. CT Results  (Last 48 hours)    None        CXR Results  (Last 48 hours)               07/06/19 1458  XR CHEST PORT Final result    Impression:  IMPRESSION:   1. No radiographic evidence of acute cardiopulmonary disease. Narrative:  INDICATION: . Cough, admission, DKA   Additional history:   COMPARISON: Previous chest xray, 6/8/2018. LIMITATIONS: Portable technique. Jose Alfredo Alonso FINDINGS: Single frontal view of the chest.    .   Lines/tubes/surgical: Cardiac monitor leads overly the patient. Heart/mediastinum: Unremarkable. Lungs/pleura:  No focal consolidation or mass. No visualized pleural effusion or   pneumothorax. Additional Comments: Bilateral nipple adornment. .               Medical Decision Making   I am the first provider for this patient. I reviewed the vital signs, available nursing notes, past medical history, past surgical history, family history and social history.     Vital Signs-Reviewed the patient's vital signs. Patient Vitals for the past 12 hrs:   Temp Pulse Resp BP SpO2   07/07/19 1052 98.9 °F (37.2 °C) 86 18 103/66 97 %   07/07/19 0728 98.3 °F (36.8 °C) 98 18 116/63 98 %   07/07/19 0406 98.2 °F (36.8 °C) 89 20 132/81 100 %       Pulse Oximetry Analysis - 97% on RA    Cardiac Monitor:   Rate: 90 bpm  Rhythm: Normal Sinus Rhythm      EKG interpretation: (Preliminary)  NSR, incomplete RBBB, rate 100, normal axis/pr/qrs, T wave inversion, ant leads, [place] Big[place], DO    Records Reviewed: Nursing Notes, Old Medical Records, Previous Radiology Studies and Previous Laboratory Studies    Provider Notes (Medical Decision Making):   DDX-DKA, electrolyte abnormality, dehydration    ED Course:   Initial assessment performed. The patients presenting problems have been discussed, and they are in agreement with the care plan formulated and outlined with them. I have encouraged them to ask questions as they arise throughout their visit. Pt seen and evaluate, pt given 2L NS with anti-emetic, pt n/v improved but demonstrates DKA, will start insulin gtt and admit. Consult Note:   Discussed with Dr. Rubio Dominguez he will see and evaluate pt for admission. Critical Care Time:   CRITICAL CARE NOTE :    IMPENDING DETERIORATION -Metabolic and Renal  ASSOCIATED RISK FACTORS - Metabolic changes and Dehydration  MANAGEMENT- Bedside Assessment and Supervision of Care  INTERPRETATION -  ECG, Blood Pressure, Cardiac Output Measures  and Labs  INTERVENTIONS - Metobolic interventions  CASE REVIEW - Hospitalist  TREATMENT RESPONSE -Stable  PERFORMED BY - Self    NOTES   :  I have spent 40 minutes of critical care time involved in lab review, consultations with specialist, family decision- making, bedside attention and documentation. During this entire length of time I was immediately available to the patient . Maximo Bigfork, DO    Disposition:  Pt in DKA, fluid bolus ordered, start glucose stabilizer.  Pt being admitted to the hospitalist.      PLAN:  1. Admit      Diagnosis     Clinical Impression:   1. Diabetic ketoacidosis without coma associated with type 1 diabetes mellitus ([facility]    2.  Diabetic neuropathy, type I diabetes mellitus ([facility]

## 2019-07-07 NOTE — PROGRESS NOTES
8:40 PM  Pt with nausea/vomiting despite PRN Zofran. Placed message to tele hospitalist to request add'l antiemetic. 10 PM  Nausea improved after compazine. Pt resting at this time. 3 AM  Insulin gtt remains on hold (Since 12:50 AM) -- per BMP his gap is closed and CO2 is WNL. No nausea since compazine given at beginning of shift. 5 AM  Insulin gtt remains on hold. 6 AM  Insulin gtt remains on hold. Glucose consistently below goal, IVF infusing.

## 2019-07-07 NOTE — PROGRESS NOTES
New Patient First Initial PCP MELL appt scheduled with Dr. Quincy Aparicio on 7/12/2019 at 11:20am. Appt added to AVS. Aditya Fuller CM Specialist

## 2019-07-07 NOTE — PROGRESS NOTES
Bedside shift change report given to Manoj Hinkle RN (oncoming nurse) by Licha Staton RN (offgoing nurse). Report included the following information SBAR, Kardex, Intake/Output, MAR and Recent Results. 5114: Insulin gtt discontinued. 1150: POC BG remains WDL. 1452: Discharge instructions and prescriptions given and explained. Pt verbalizes understanding.

## 2019-07-07 NOTE — DISCHARGE SUMMARY
Hospitalist Discharge Summary     Patient ID:  Wood Friday  [id number]  41 y.o.  1980 7/6/2019    PCP on record: None    Admit date: 7/6/2019  Discharge date and time: 7/7/2019    DISCHARGE DIAGNOSIS:  Diabetic ketoacidosis in type I diabetic POA  Recurrent nausea vomiting due to DKA POA  Possible upper respiratory infection POA  Acute kidney injury POA  Pseudohyponatremia POA  Active tobacco use POA  Prior heavy alcohol use POA    CONSULTATIONS:  IP CONSULT TO HOSPITALIST    Excerpted HPI from H&P of Paul Mojica MD:  45-year-old white male     Known type 1 diabetes mellitus with prior history of DKA     Currently does not have a PCP  Sees doctors \"when I come to the hospital\"  Last hemoglobin A1c April 2019 was 13.8  Currently uses his mother's insulin,       Lantus 46 units in the morning plus sliding scale  Currently has no insurance     3year-old daughter recently had upper respiratory infection  Patient developed cough and generalized aches  Compliant with his insulin  Blood sugars have been running mostly in the 200-300 range  This morning took his insulin dose  Afterwards he developed recurrent nausea vomiting, multiple times, not able to keep anything down  No blood or coffee grounds  No diarrhea  Mild to moderate generalized abdominal pain, back pain,   mild chest pain and sore throat with the vomiting  No fevers or chills, headache \"like cough real bad\"  Nonproductive cough     Came into the emergency room because of the recurrent nausea vomiting  Blood sugar 537, anion gap 21  Sodium 131  BUN/creatinine 26 and 1.36, last baseline creatinine was 0.5  Alkaline phosphatase increased at 258  Insulin drip started  We were called to admit the patient  ______________________________________________________________________  DISCHARGE SUMMARY/HOSPITAL COURSE:  for full details see H&P, daily progress notes, labs, consult notes.      Patient was admitted to PCU on a continuous insulin infusion. Overnight, his glucose levels improved significantly and anion gap closed. Therefore, he was transitioned to subcutaneous insulin in the morning and given a diet. He tolerated this well, with no abdominal pain, nausea, or vomiting. Lab abnormalities observed on admission improved with treatment as above. FELECIA resolved, acidosis resolved, pseudohyponatremia resolved. As such, he was felt stable for discharge to home. He was prescribed NPH to replace the insulin he has been using, due to cost. He also was offered a PCP appointment within one week so that he can establish more regular primary care and obtain his own medications. _______________________________________________________________________  Patient seen and examined by me on discharge day. Pertinent Findings:  Gen:    Not in distress, nontoxic, sitting upright  Chest: Clear lungs  CVS:   Regular rhythm. No edema  Abd:  Soft, not distended, not tender  Neuro:  Alert, oriented, conversant, asking appropriate questions. _______________________________________________________________________  DISCHARGE MEDICATIONS:   Current Discharge Medication List      START taking these medications    Details   benzonatate (TESSALON) 100 mg capsule Take 1 Cap by mouth three (3) times daily as needed for Cough for up to 7 days. Qty: 20 Cap, Refills: 0      oxyCODONE-acetaminophen (PERCOCET) 5-325 mg per tablet Take 1 Tab by mouth every six (6) hours as needed for Pain for up to 5 days. Max Daily Amount: 4 Tabs. Indications: Pain  Qty: 20 Tab, Refills: 0    Associated Diagnoses: Diabetic neuropathy, type I diabetes mellitus ([facility]      insulin NPH (HUMULIN N) 100 unit/mL (3 mL) inpn 23 Units by SubCUTAneous route Before breakfast and dinner. Qty: 5 Pen, Refills: 0      insulin needles, disposable, 29 gauge x 1/2\" ndle Use to administer insulin twice a day.   Qty: 60 Pen Needle, Refills: 0         CONTINUE these medications which have NOT CHANGED    Details pantoprazole (PROTONIX) 40 mg tablet Take 1 Tab by mouth daily. Qty: 30 Tab, Refills: 5      insulin regular (NOVOLIN R) 100 unit/mL injection 2-10 Units by SubCUTAneous route Before breakfast, lunch, and dinner. Blood Glucose (mg/dL)       Insulin Dose (units)              150-200                               2               201-250                               4               251-300                               6               301-350                               8               >351                                   10         STOP taking these medications       insulin glargine (LANTUS U-100 INSULIN) 100 unit/mL injection Comments:   Reason for Stopping:                 Patient Follow Up Instructions: Activity: Activity as tolerated  Diet: Diabetic Diet  Wound Care: None needed    Follow-up with [facility] in 5 days.   Follow-up tests/labs N/A  Follow-up Information     Follow up With Specialties Details Why Contact Info    Jose Justice NP Nurse Practitioner Go on 7/12/2019 Hospital follow-up scheduled at 11:20am Please bring Insurance Card, Discharge paperwork, Photo ID, list of medications and any co-pay you may have ( If you have questions or need to reschedule please call [facility] [place] [phone] [facility] End[facility]   [address]  [phone]    None    None (506) Patient stated that they have no PCP          ________________________________________________________________    Risk of deterioration: Moderate    Condition at Discharge:  Stable  __________________________________________________________________    Disposition  Home with family, no needs    ____________________________________________________________________    Code Status: Full Code  ___________________________________________________________________      Total time in minutes spent coordinating this discharge (includes going over instructions, follow-up, prescriptions, and preparing report for sign off to her PCP) :  45 minutes    Signed:  Javi Ludwig MD

## 2019-07-09 ENCOUNTER — HOSPITAL ENCOUNTER (EMERGENCY)
Age: 39
Discharge: LEFT BEFORE DISCHARGE | End: 2019-07-09
Attending: EMERGENCY MEDICINE
Payer: SELF-PAY

## 2019-07-09 VITALS
HEART RATE: 100 BPM | RESPIRATION RATE: 16 BRPM | SYSTOLIC BLOOD PRESSURE: 118 MMHG | HEIGHT: 69 IN | DIASTOLIC BLOOD PRESSURE: 81 MMHG | TEMPERATURE: 99.1 F | BODY MASS INDEX: 19.27 KG/M2 | WEIGHT: 130.07 LBS | OXYGEN SATURATION: 99 %

## 2019-07-09 LAB
GLUCOSE BLD STRIP.AUTO-MCNC: 529 MG/DL (ref 65–100)
SERVICE CMNT-IMP: ABNORMAL

## 2019-07-09 PROCEDURE — 75810000275 HC EMERGENCY DEPT VISIT NO LEVEL OF CARE

## 2019-07-09 PROCEDURE — 82962 GLUCOSE BLOOD TEST: CPT

## 2019-08-05 ENCOUNTER — HOSPITAL ENCOUNTER (EMERGENCY)
Age: 39
Discharge: HOME | End: 2019-08-05
Attending: EMERGENCY MEDICINE
Payer: SUBSIDIZED

## 2019-08-05 ENCOUNTER — APPOINTMENT (OUTPATIENT)
Dept: ULTRASOUND IMAGING | Age: 39
End: 2019-08-05
Attending: NURSE PRACTITIONER
Payer: SUBSIDIZED

## 2019-08-05 VITALS
TEMPERATURE: 98 F | SYSTOLIC BLOOD PRESSURE: 146 MMHG | BODY MASS INDEX: 21.72 KG/M2 | RESPIRATION RATE: 16 BRPM | WEIGHT: 143.3 LBS | HEIGHT: 68 IN | OXYGEN SATURATION: 100 % | HEART RATE: 101 BPM | DIASTOLIC BLOOD PRESSURE: 93 MMHG

## 2019-08-05 DIAGNOSIS — R60.0 BILATERAL LOWER EXTREMITY EDEMA: Primary | ICD-10-CM

## 2019-08-05 LAB
ALBUMIN SERPL-MCNC: 2.3 G/DL (ref 3.5–5)
ALBUMIN/GLOB SERPL: 0.6 {RATIO} (ref 1.1–2.2)
ALP SERPL-CCNC: 656 U/L (ref 45–117)
ALT SERPL-CCNC: 210 U/L (ref 12–78)
ANION GAP SERPL CALC-SCNC: 4 MMOL/L (ref 5–15)
AST SERPL-CCNC: 102 U/L (ref 15–37)
BASOPHILS # BLD: 0 K/UL (ref 0–0.1)
BASOPHILS NFR BLD: 1 % (ref 0–1)
BILIRUB SERPL-MCNC: 0.2 MG/DL (ref 0.2–1)
BUN SERPL-MCNC: 17 MG/DL (ref 6–20)
BUN/CREAT SERPL: 33 (ref 12–20)
CALCIUM SERPL-MCNC: 8.3 MG/DL (ref 8.5–10.1)
CHLORIDE SERPL-SCNC: 103 MMOL/L (ref 97–108)
CO2 SERPL-SCNC: 30 MMOL/L (ref 21–32)
CREAT SERPL-MCNC: 0.52 MG/DL (ref 0.7–1.3)
DIFFERENTIAL METHOD BLD: ABNORMAL
EOSINOPHIL # BLD: 0.4 K/UL (ref 0–0.4)
EOSINOPHIL NFR BLD: 6 % (ref 0–7)
ERYTHROCYTE [DISTWIDTH] IN BLOOD BY AUTOMATED COUNT: 13.5 % (ref 11.5–14.5)
GLOBULIN SER CALC-MCNC: 3.8 G/DL (ref 2–4)
GLUCOSE BLD STRIP.AUTO-MCNC: 172 MG/DL (ref 65–100)
GLUCOSE BLD STRIP.AUTO-MCNC: 53 MG/DL (ref 65–100)
GLUCOSE BLD STRIP.AUTO-MCNC: 58 MG/DL (ref 65–100)
GLUCOSE SERPL-MCNC: 148 MG/DL (ref 65–100)
HAV IGM SER QL: NONREACTIVE
HBV CORE IGM SER QL: NONREACTIVE
HBV SURFACE AG SER QL: <0.1 INDEX
HBV SURFACE AG SER QL: NEGATIVE
HCT VFR BLD AUTO: 36.1 % (ref 36.6–50.3)
HCV AB SER IA-ACNC: 10.11 INDEX
HCV AB SERPL QL IA: REACTIVE
HCV COMMENT,HCGAC: ABNORMAL
HGB BLD-MCNC: 12 G/DL (ref 12.1–17)
IMM GRANULOCYTES # BLD AUTO: 0.1 K/UL (ref 0–0.04)
IMM GRANULOCYTES NFR BLD AUTO: 1 % (ref 0–0.5)
LYMPHOCYTES # BLD: 2.1 K/UL (ref 0.8–3.5)
LYMPHOCYTES NFR BLD: 34 % (ref 12–49)
MCH RBC QN AUTO: 31.8 PG (ref 26–34)
MCHC RBC AUTO-ENTMCNC: 33.2 G/DL (ref 30–36.5)
MCV RBC AUTO: 95.8 FL (ref 80–99)
MONOCYTES # BLD: 0.7 K/UL (ref 0–1)
MONOCYTES NFR BLD: 11 % (ref 5–13)
NEUTS SEG # BLD: 3 K/UL (ref 1.8–8)
NEUTS SEG NFR BLD: 48 % (ref 32–75)
NRBC # BLD: 0 K/UL (ref 0–0.01)
NRBC BLD-RTO: 0 PER 100 WBC
PLATELET # BLD AUTO: 404 K/UL (ref 150–400)
PMV BLD AUTO: 10.3 FL (ref 8.9–12.9)
POTASSIUM SERPL-SCNC: 3.7 MMOL/L (ref 3.5–5.1)
PROT SERPL-MCNC: 6.1 G/DL (ref 6.4–8.2)
RBC # BLD AUTO: 3.77 M/UL (ref 4.1–5.7)
SERVICE CMNT-IMP: ABNORMAL
SODIUM SERPL-SCNC: 137 MMOL/L (ref 136–145)
SP1: ABNORMAL
SP2: ABNORMAL
SP3: ABNORMAL
WBC # BLD AUTO: 6.2 K/UL (ref 4.1–11.1)

## 2019-08-05 PROCEDURE — 76705 ECHO EXAM OF ABDOMEN: CPT

## 2019-08-05 PROCEDURE — 77030018836 HC SOL IRR NACL ICUM -A

## 2019-08-05 PROCEDURE — 96374 THER/PROPH/DIAG INJ IV PUSH: CPT

## 2019-08-05 PROCEDURE — 80074 ACUTE HEPATITIS PANEL: CPT

## 2019-08-05 PROCEDURE — 74011000258 HC RX REV CODE- 258: Performed by: NURSE PRACTITIONER

## 2019-08-05 PROCEDURE — 93970 EXTREMITY STUDY: CPT

## 2019-08-05 PROCEDURE — 36415 COLL VENOUS BLD VENIPUNCTURE: CPT

## 2019-08-05 PROCEDURE — 80053 COMPREHEN METABOLIC PANEL: CPT

## 2019-08-05 PROCEDURE — 82962 GLUCOSE BLOOD TEST: CPT

## 2019-08-05 PROCEDURE — 74011250637 HC RX REV CODE- 250/637: Performed by: NURSE PRACTITIONER

## 2019-08-05 PROCEDURE — 99285 EMERGENCY DEPT VISIT HI MDM: CPT

## 2019-08-05 PROCEDURE — 85025 COMPLETE CBC W/AUTO DIFF WBC: CPT

## 2019-08-05 RX ORDER — OXYCODONE HYDROCHLORIDE 5 MG/1
5 TABLET ORAL
Qty: 20 TAB | Refills: 0 | Status: SHIPPED | OUTPATIENT
Start: 2019-08-05 | End: 2019-08-10

## 2019-08-05 RX ORDER — DEXTROSE MONOHYDRATE 100 MG/ML
200 INJECTION, SOLUTION INTRAVENOUS ONCE
Status: COMPLETED | OUTPATIENT
Start: 2019-08-05 | End: 2019-08-05

## 2019-08-05 RX ORDER — OXYCODONE AND ACETAMINOPHEN 5; 325 MG/1; MG/1
1 TABLET ORAL
Status: COMPLETED | OUTPATIENT
Start: 2019-08-05 | End: 2019-08-05

## 2019-08-05 RX ORDER — OXYCODONE AND ACETAMINOPHEN 5; 325 MG/1; MG/1
1 TABLET ORAL
COMMUNITY
End: 2019-10-29 | Stop reason: ALTCHOICE

## 2019-08-05 RX ADMIN — DEXTROSE MONOHYDRATE 200 ML: 10 INJECTION, SOLUTION INTRAVENOUS at 14:38

## 2019-08-05 RX ADMIN — OXYCODONE HYDROCHLORIDE AND ACETAMINOPHEN 1 TABLET: 5; 325 TABLET ORAL at 12:03

## 2019-08-05 NOTE — DISCHARGE INSTRUCTIONS
Patient Education        Leg and Ankle Edema: Care Instructions  Your Care Instructions  Swelling in the legs, ankles, and feet is called edema. It is common after you sit or stand for a while. Long plane flights or car rides often cause swelling in the legs and feet. You may also have swelling if you have to stand for long periods of time at your job. Problems with the veins in the legs (varicose veins) and changes in hormones can also cause swelling. Sometimes the swelling in the ankles and feet is caused by a more serious problem, such as heart failure, infection, blood clots, or liver or kidney disease. Follow-up care is a key part of your treatment and safety. Be sure to make and go to all appointments, and call your doctor if you are having problems. It's also a good idea to know your test results and keep a list of the medicines you take. How can you care for yourself at home? · If your doctor gave you medicine, take it as prescribed. Call your doctor if you think you are having a problem with your medicine. · Whenever you are resting, raise your legs up. Try to keep the swollen area higher than the level of your heart. · Take breaks from standing or sitting in one position. ? Walk around to increase the blood flow in your lower legs. ? Move your feet and ankles often while you stand, or tighten and relax your leg muscles. · Wear support stockings. Put them on in the morning, before swelling gets worse. · Eat a balanced diet. Lose weight if you need to. · Limit the amount of salt (sodium) in your diet. Salt holds fluid in the body and may increase swelling. When should you call for help? Call 911 anytime you think you may need emergency care. For example, call if:    · You have symptoms of a blood clot in your lung (called a pulmonary embolism). These may include:  ? Sudden chest pain. ? Trouble breathing. ?  Coughing up blood.    Call your doctor now or seek immediate medical care if:    · You have signs of a blood clot, such as:  ? Pain in your calf, back of the knee, thigh, or groin. ? Redness and swelling in your leg or groin.     · You have symptoms of infection, such as:  ? Increased pain, swelling, warmth, or redness. ? Red streaks or pus. ? A fever.    Watch closely for changes in your health, and be sure to contact your doctor if:    · Your swelling is getting worse.     · You have new or worsening pain in your legs.     · You do not get better as expected. Where can you learn more? Go to http://dwaine-sanjeev.info/. Enter M134 in the search box to learn more about \"Leg and Ankle Edema: Care Instructions. \"  Current as of: September 23, 2018  Content Version: 12.1  © 1742-7356 [employer]. Care instructions adapted under license by [employer] (which disclaims liability or warranty for this information). If you have questions about a medical condition or this instruction, always ask your healthcare professional. [place] [address] any warranty or liability for your use of this information. We hope that we have addressed all of your medical concerns. The examination and treatment you received in the Emergency Department were for an emergent problem and were not intended as complete care. It is important that you follow up with your healthcare provider(s) for ongoing care. If your symptoms worsen or do not improve as expected, and you are unable to reach your usual health care provider(s), you should return to the Emergency Department. Today's healthcare is undergoing tremendous change, and patient satisfaction surveys are one of the many tools to assess the quality of medical care. You may receive a survey from the [employer] organization regarding your experience in the Emergency Department. I hope that your experience has been completely positive, particularly the medical care that I provided.   As such, please participate in the survey; anything less than excellent does not meet my expectations or intentions. [facility] and [facility] participate in nationally recognized quality of care measures. If your blood pressure is greater than 120/80, as reported below, we urge that you seek medical care to address the potential of high blood pressure, commonly known as hypertension. Hypertension can be hereditary or can be caused by certain medical conditions, pain, stress, or \"white coat syndrome. \"       Please make an appointment with your health care provider(s) for follow up of your Emergency Department visit. VITALS:   Patient Vitals for the past 8 hrs:   Temp Pulse Resp BP SpO2   08/05/19 1539 -- -- -- (!) 146/93 100 %   08/05/19 1400 -- -- -- 137/89 98 %   08/05/19 1315 -- -- -- 132/83 98 %   08/05/19 1230 -- -- -- 143/90 97 %   08/05/19 1145 -- -- -- (!) 143/93 99 %   08/05/19 1041 98 °F (36.7 °C) (!) 101 16 (!) 147/100 100 %          Thank you for allowing us to provide you with medical care today. We realize that you have many choices for your emergency care needs. Please choose us in the future for any continued health care needs. Neymar Schultz, [facility], [facility].   Office: [phone]            Recent Results (from the past 24 hour(s))   CBC WITH AUTOMATED DIFF    Collection Time: 08/05/19 11:43 AM   Result Value Ref Range    WBC 6.2 4.1 - 11.1 K/uL    RBC 3.77 (L) 4.10 - 5.70 M/uL    HGB 12.0 (L) 12.1 - 17.0 g/dL    HCT 36.1 (L) 36.6 - 50.3 %    MCV 95.8 80.0 - 99.0 FL    MCH 31.8 26.0 - 34.0 PG    MCHC 33.2 30.0 - 36.5 g/dL    RDW 13.5 11.5 - 14.5 %    PLATELET 050 (H) 165 - 400 K/uL    MPV 10.3 8.9 - 12.9 FL    NRBC 0.0 0  WBC    ABSOLUTE NRBC 0.00 0.00 - 0.01 K/uL    NEUTROPHILS 48 32 - 75 %    LYMPHOCYTES 34 12 - 49 %    MONOCYTES 11 5 - 13 %    EOSINOPHILS 6 0 - 7 %    BASOPHILS 1 0 - 1 % IMMATURE GRANULOCYTES 1 (H) 0.0 - 0.5 %    ABS. NEUTROPHILS 3.0 1.8 - 8.0 K/UL    ABS. LYMPHOCYTES 2.1 0.8 - 3.5 K/UL    ABS. MONOCYTES 0.7 0.0 - 1.0 K/UL    ABS. EOSINOPHILS 0.4 0.0 - 0.4 K/UL    ABS. BASOPHILS 0.0 0.0 - 0.1 K/UL    ABS. IMM. GRANS. 0.1 (H) 0.00 - 0.04 K/UL    DF AUTOMATED     METABOLIC PANEL, COMPREHENSIVE    Collection Time: 08/05/19 11:43 AM   Result Value Ref Range    Sodium 137 136 - 145 mmol/L    Potassium 3.7 3.5 - 5.1 mmol/L    Chloride 103 97 - 108 mmol/L    CO2 30 21 - 32 mmol/L    Anion gap 4 (L) 5 - 15 mmol/L    Glucose 148 (H) 65 - 100 mg/dL    BUN 17 6 - 20 MG/DL    Creatinine 0.52 (L) 0.70 - 1.30 MG/DL    BUN/Creatinine ratio 33 (H) 12 - 20      GFR est AA >60 >60 ml/min/1.73m2    GFR est non-AA >60 >60 ml/min/1.73m2    Calcium 8.3 (L) 8.5 - 10.1 MG/DL    Bilirubin, total 0.2 0.2 - 1.0 MG/DL    ALT (SGPT) 210 (H) 12 - 78 U/L    AST (SGOT) 102 (H) 15 - 37 U/L    Alk. phosphatase 656 (H) 45 - 117 U/L    Protein, total 6.1 (L) 6.4 - 8.2 g/dL    Albumin 2.3 (L) 3.5 - 5.0 g/dL    Globulin 3.8 2.0 - 4.0 g/dL    A-G Ratio 0.6 (L) 1.1 - 2.2     GLUCOSE, POC    Collection Time: 08/05/19  2:10 PM   Result Value Ref Range    Glucose (POC) 58 (L) 65 - 100 mg/dL    Performed by KVNG HERNÁNDEZ ([facility])    GLUCOSE, POC    Collection Time: 08/05/19  2:26 PM   Result Value Ref Range    Glucose (POC) 53 (L) 65 - 100 mg/dL    Performed by KVNG HERNÁNDEZ ([facility])    GLUCOSE, POC    Collection Time: 08/05/19  2:45 PM   Result Value Ref Range    Glucose (POC) 172 (H) 65 - 100 mg/dL    Performed by [address] [place] [address]    Result Date: 8/5/2019  ULTRASOUND OF THE RIGHT UPPER QUADRANT INDICATION: Elevated liver function tests. COMPARISON: 7/7/2019, CT abdomen pelvis 11/20/2018. TECHNIQUE: Sonography of the right upper quadrant was performed. FINDINGS: LIVER: Normal echogenicity. No focal hepatic mass or intrahepatic biliary dilatation is shown.  GALLBLADDER: No gallstones, wall thickening, or pericholecystic fluid. Minimal sludge layers dependently. COMMON DUCT: 0.3 cm in diameter. The duct is normal caliber. PANCREAS: The visualized portions of the pancreas are normal. RIGHT KIDNEY: 14.4 cm in length. No hydronephrosis, shadowing calculus, or contour-deforming renal mass. IMPRESSION: Normal right upper quadrant ultrasound.

## 2019-08-05 NOTE — ED NOTES
HYPOGLYCEMIC EPISODE DOCUMENTATION    Patient with hypoglycemic episode(s) at 1411(time) on 8/5/19(date). BG value(s) pre-treatment 62    Was patient symptomatic? [x] yes, [] no patient called and reported that he felt like hisPatient was treated with the following rescue medications/treatments: [] D50                [] Glucose tablets                [] Glucagon                [x] 4oz juice                [] 6oz reg soda                [] 8oz low fat milk  BG value post-treatment: 53  Once BG treated and value greater than 80mg/dl, pt was provided with the following:  [] snack  [] meal  Name of MD notified:Renee PARKS  The following orders were received: NP ordered D10 after BS of 53. Patient had already been given 4oz of juice      [address]    Patient with hypoglycemic episode(s) at 1442(time) on 8/5/19(date). BG value(s) pre-treatment 48  Was patient symptomatic?  [] yes, [x] no  Patient was treated with the following rescue medications/treatments: [] D50                [] Glucose tablets                [] Glucagon                [x] 4oz juice                [] 6oz reg soda                [] 8oz low fat milk  BG value post-treatment: 172, D10 paused   Once BG treated and value greater than 80mg/dl, pt was provided with the following:  [] snack  [x] meal  Name of MD notified:CHARLY Garland  The following orders were received: n/a

## 2019-08-05 NOTE — ED NOTES
Patient discharge instructions reviewed with patient by NP [place] Pacheco. Patient verbalized understanding. Patient declined wheelchair, ambulatory off unit.

## 2019-08-06 NOTE — ED PROVIDER NOTES
EMERGENCY DEPARTMENT HISTORY AND PHYSICAL EXAM      Date: 8/5/2019  Patient Name: Stephan Nelson    History of Presenting Illness     Chief Complaint   Patient presents with    Leg Swelling     pt repors swelling to bilateral lower legs x1 week       History Provided By: Patient    HPI: Stephan Nelson, 41 y.o. male with PMHx significant for diabetes, bipolar disorder, MRSA bacteremia, seizures, diabetes type 1 insulin-dependent, and hepatitis C, presents ambulatory from home to the ED with cc of bilateral lower extremity edema that is been ongoing for the last week. Patient states that one day he just woke up in his legs were swollen. Swelling is more progressive throughout the course of the day with ambulation. Swelling is only mildly improved at bedtime with legs elevated. He has tried Tylenol for pain without any improvement. Pt denies fevers, chills, night sweats, chest pain, pressure, SOB, [place], PND, orthopnea, abdominal pain, n/v/d, melena, hematuria, dysuria, constipation, HA, dizziness, and syncope      There are no other complaints, changes, or physical findings at this time. PCP: Sunday Antonio NP    No current facility-administered medications on file prior to encounter. Current Outpatient Medications on File Prior to Encounter   Medication Sig Dispense Refill    oxyCODONE-acetaminophen (PERCOCET) 5-325 mg per tablet Take 1 Tab by mouth every four (4) hours as needed for Pain.  insulin NPH (HUMULIN N) 100 unit/mL (3 mL) inpn 23 Units by SubCUTAneous route Before breakfast and dinner. 5 Pen 0    insulin needles, disposable, 29 gauge x 1/2\" ndle Use to administer insulin twice a day. 60 Pen Needle 0    pantoprazole (PROTONIX) 40 mg tablet Take 1 Tab by mouth daily. 30 Tab 5    insulin regular (NOVOLIN R) 100 unit/mL injection 2-10 Units by SubCUTAneous route Before breakfast, lunch, and dinner.  Blood Glucose (mg/dL)       Insulin Dose (units)              150-200 2               201-250                               4               251-300                               6               301-350                               8               >351                                   10         Past History     Past Medical History:  Past Medical History:   Diagnosis Date    [facility]     Bipolar disorder ([facility]     Depression     Diabetes ([facility]     DKA, type 1 ([facility] 1/27/2013    diagnosed age 23    H/O noncompliance with medical treatment, presenting hazards to health     MRSA (methicillin resistant staph aureus) culture positive     MRSA (methicillin resistant Staphylococcus aureus)     Face    Noncompliance with medication regimen     Second hand smoke exposure     Seizure ([facility]     Seizures ([facility] 2006 or 2007    one episode during halfway       Past Surgical History:  Past Surgical History:   Procedure Laterality Date    HX HEENT      top left wisdom tooth    HX ORTHOPAEDIC Left     wrist; [facility]CV    UPPER GI ENDOSCOPY,BIOPSY  11/20/2018            Family History:  Family History   Problem Relation Age of Onset    Diabetes Mother     Diabetes Other         neice, type 1        Social History:  Social History     Tobacco Use    Smoking status: Current Some Day Smoker     Packs/day: 0.10     Years: 16.00     Pack years: 1.60     Types: Cigarettes    Smokeless tobacco: Never Used   Substance Use Topics    Alcohol use: No    Drug use: No       Allergies:  No Known Allergies      Review of Systems   Review of Systems   Constitutional: Negative for activity change, appetite change, chills, diaphoresis, fatigue, fever and unexpected weight change. HENT: Negative for congestion, ear pain, rhinorrhea, sinus pressure, sore throat, tinnitus, trouble swallowing and voice change. Eyes: Negative for pain, discharge, redness and visual disturbance.    Respiratory: Negative for apnea, cough, choking, chest tightness, shortness of breath, wheezing and stridor. Cardiovascular: Positive for leg swelling. Negative for chest pain and palpitations. Gastrointestinal: Negative for abdominal pain, constipation, nausea and vomiting. Endocrine: Negative for cold intolerance and heat intolerance. Genitourinary: Negative for difficulty urinating, dysuria, flank pain, hematuria, testicular pain and urgency. Musculoskeletal: Negative for arthralgias, back pain, gait problem, joint swelling, myalgias, neck pain and neck stiffness. BLE pain    Skin: Negative for color change, pallor, rash and wound. Allergic/Immunologic: Negative for immunocompromised state. Neurological: Negative for dizziness, tremors, syncope, weakness, light-headedness, numbness and headaches. Hematological: Does not bruise/bleed easily. Psychiatric/Behavioral: Negative for agitation, confusion and suicidal ideas. Physical Exam   Physical Exam   Constitutional: He is oriented to person, place, and time. He appears well-developed and well-nourished. No distress. HENT:   Head: Atraumatic. Nose: Nose normal.   Mouth/Throat: No oropharyngeal exudate. Eyes: Conjunctivae and EOM are normal. Right eye exhibits no discharge. Left eye exhibits no discharge. No scleral icterus. Neck: Normal range of motion. Neck supple. No JVD present. No tracheal deviation present. No thyromegaly present. Cardiovascular: Normal rate and regular rhythm. Exam reveals no gallop and no friction rub. No murmur heard. + 2 pitting edema of BLE    Pulmonary/Chest: Breath sounds normal. No stridor. No respiratory distress. He has no wheezes. He has no rales. He exhibits no tenderness. Abdominal: Soft. Bowel sounds are normal. He exhibits no distension and no mass. There is no tenderness. There is no rebound and no guarding. Musculoskeletal: Normal range of motion. He exhibits no edema or tenderness. Lymphadenopathy:     He has no cervical adenopathy.    Neurological: He is alert and oriented to person, place, and time. Coordination normal.   Skin: Skin is warm and dry. He is not diaphoretic. No redness, erythema, induration, demarcation of bilateral lower extremities   Psychiatric: He has a normal mood and affect. His behavior is normal.   Nursing note and vitals reviewed. Diagnostic Study Results     Labs -   No results found for this or any previous visit (from the past 12 hour(s)). Radiologic Studies -   [facility]   Final Result   IMPRESSION:    Normal right upper quadrant ultrasound. CT Results  (Last 48 hours)    None        CXR Results  (Last 48 hours)    None            Medical Decision Making   I am the first provider for this patient. I reviewed the vital signs, available nursing notes, past medical history, past surgical history, family history and social history. Vital Signs-Reviewed the patient's vital signs. Visit Vitals  BP (!) 146/93   Pulse (!) 101   Temp 98 °F (36.7 °C)   Resp 16   Ht 5' 8\" (1.727 m)   Wt 65 kg (143 lb 4.8 oz)   SpO2 100%   BMI 21.79 kg/m²     Pulse Oximetry Analysis - 100% on RA    Cardiac Monitor:   Rate: 89 bpm  Rhythm: Normal Sinus Rhythm      Records Reviewed: Nursing Notes, Old Medical Records and Previous electrocardiograms    Provider Notes (Medical Decision Making):   Bilateral lower extremity edema  LFTs elevated transiently    Routine laboratory data, duplex bilateral lower extremities, duplex hepatic system    History of hepatitis C today with mildly elevated LFTs concern for impending cirrhotic process possibly contributing to bilateral lower extremity edema. Will refer patient to GI    ED Course:   Initial assessment performed. The patients presenting problems have been discussed, and they are in agreement with the care plan formulated and outlined with them. I have encouraged them to ask questions as they arise throughout their visit.       Stable ambulatory patient in no acute distress    Critical Care Time: 0    Disposition:  Discharge to home with primary care and GI follow-up    PLAN:  1. Discharge Medication List as of 8/5/2019  3:45 PM        2. Follow-up Information     Follow up With Specialties Details Why Contact Info    Crystal Ortiz NP Nurse Practitioner In 2 days  [address]      Mar Parks MD Gastroenterology In 2 days  [address]  [facility]  [phone]      [facility] EMERGENCY DEPT Emergency Medicine  As needed, If symptoms worsen [facility]  [address] [facility]  [phone]        Return to ED if worse     Diagnosis     Clinical Impression:   1.  Bilateral lower extremity edema        Attestations:    Scarlett Nicolas NP  11:26 AM

## 2019-10-29 ENCOUNTER — HOSPITAL ENCOUNTER (INPATIENT)
Age: 39
LOS: 3 days | Discharge: HOME | DRG: 872 | End: 2019-11-01
Attending: EMERGENCY MEDICINE | Admitting: INTERNAL MEDICINE
Payer: SELF-PAY

## 2019-10-29 ENCOUNTER — APPOINTMENT (OUTPATIENT)
Dept: CT IMAGING | Age: 39
DRG: 872 | End: 2019-10-29
Attending: EMERGENCY MEDICINE
Payer: SELF-PAY

## 2019-10-29 ENCOUNTER — APPOINTMENT (OUTPATIENT)
Dept: GENERAL RADIOLOGY | Age: 39
DRG: 872 | End: 2019-10-29
Attending: EMERGENCY MEDICINE
Payer: SELF-PAY

## 2019-10-29 DIAGNOSIS — R33.9 URINARY RETENTION: Primary | ICD-10-CM

## 2019-10-29 DIAGNOSIS — N17.9 ACUTE KIDNEY INJURY (HCC): ICD-10-CM

## 2019-10-29 PROBLEM — N13.30 HYDRONEPHROSIS OF RIGHT KIDNEY: Status: ACTIVE | Noted: 2019-10-29

## 2019-10-29 LAB
ABO + RH BLD: NORMAL
ALBUMIN SERPL-MCNC: 3.5 G/DL (ref 3.5–5)
ALBUMIN/GLOB SERPL: 0.7 {RATIO} (ref 1.1–2.2)
ALP SERPL-CCNC: 262 U/L (ref 45–117)
ALT SERPL-CCNC: 68 U/L (ref 12–78)
ANION GAP SERPL CALC-SCNC: 14 MMOL/L (ref 5–15)
APPEARANCE UR: CLEAR
AST SERPL-CCNC: 18 U/L (ref 15–37)
BACTERIA URNS QL MICRO: NEGATIVE /HPF
BASOPHILS # BLD: 0.1 K/UL (ref 0–0.1)
BASOPHILS NFR BLD: 0 % (ref 0–1)
BILIRUB SERPL-MCNC: 0.7 MG/DL (ref 0.2–1)
BILIRUB UR QL: NEGATIVE
BLOOD GROUP ANTIBODIES SERPL: NORMAL
BUN SERPL-MCNC: 47 MG/DL (ref 6–20)
BUN/CREAT SERPL: 21 (ref 12–20)
CALCIUM SERPL-MCNC: 10.1 MG/DL (ref 8.5–10.1)
CHLORIDE SERPL-SCNC: 94 MMOL/L (ref 97–108)
CO2 SERPL-SCNC: 31 MMOL/L (ref 21–32)
COLOR UR: ABNORMAL
COMMENT, HOLDF: NORMAL
CREAT SERPL-MCNC: 2.23 MG/DL (ref 0.7–1.3)
DIFFERENTIAL METHOD BLD: ABNORMAL
EOSINOPHIL # BLD: 0 K/UL (ref 0–0.4)
EOSINOPHIL NFR BLD: 0 % (ref 0–7)
EPITH CASTS URNS QL MICRO: ABNORMAL /LPF
ERYTHROCYTE [DISTWIDTH] IN BLOOD BY AUTOMATED COUNT: 12.7 % (ref 11.5–14.5)
FLUAV AG NPH QL IA: NEGATIVE
FLUBV AG NOSE QL IA: NEGATIVE
GLOBULIN SER CALC-MCNC: 4.7 G/DL (ref 2–4)
GLUCOSE BLD STRIP.AUTO-MCNC: 46 MG/DL (ref 65–100)
GLUCOSE BLD STRIP.AUTO-MCNC: 53 MG/DL (ref 65–100)
GLUCOSE BLD STRIP.AUTO-MCNC: 66 MG/DL (ref 65–100)
GLUCOSE BLD STRIP.AUTO-MCNC: 80 MG/DL (ref 65–100)
GLUCOSE BLD STRIP.AUTO-MCNC: 85 MG/DL (ref 65–100)
GLUCOSE BLD STRIP.AUTO-MCNC: 92 MG/DL (ref 65–100)
GLUCOSE SERPL-MCNC: 77 MG/DL (ref 65–100)
GLUCOSE UR STRIP.AUTO-MCNC: >1000 MG/DL
HCT VFR BLD AUTO: 47.6 % (ref 36.6–50.3)
HEMOCCULT STL QL: POSITIVE
HGB BLD-MCNC: 17.1 G/DL (ref 12.1–17)
HGB UR QL STRIP: NEGATIVE
HYALINE CASTS URNS QL MICRO: ABNORMAL /LPF (ref 0–5)
IMM GRANULOCYTES # BLD AUTO: 0.1 K/UL (ref 0–0.04)
IMM GRANULOCYTES NFR BLD AUTO: 0 % (ref 0–0.5)
KETONES UR QL STRIP.AUTO: 80 MG/DL
LACTATE SERPL-SCNC: 0.7 MMOL/L (ref 0.4–2)
LEUKOCYTE ESTERASE UR QL STRIP.AUTO: NEGATIVE
LYMPHOCYTES # BLD: 2.2 K/UL (ref 0.8–3.5)
LYMPHOCYTES NFR BLD: 12 % (ref 12–49)
MCH RBC QN AUTO: 31.6 PG (ref 26–34)
MCHC RBC AUTO-ENTMCNC: 35.9 G/DL (ref 30–36.5)
MCV RBC AUTO: 88 FL (ref 80–99)
MONOCYTES # BLD: 1.7 K/UL (ref 0–1)
MONOCYTES NFR BLD: 9 % (ref 5–13)
NEUTS SEG # BLD: 14.2 K/UL (ref 1.8–8)
NEUTS SEG NFR BLD: 79 % (ref 32–75)
NITRITE UR QL STRIP.AUTO: NEGATIVE
NRBC # BLD: 0 K/UL (ref 0–0.01)
NRBC BLD-RTO: 0 PER 100 WBC
PH UR STRIP: 5.5 [PH] (ref 5–8)
PLATELET # BLD AUTO: 480 K/UL (ref 150–400)
PMV BLD AUTO: 10.1 FL (ref 8.9–12.9)
POTASSIUM SERPL-SCNC: 3.5 MMOL/L (ref 3.5–5.1)
PROT SERPL-MCNC: 8.2 G/DL (ref 6.4–8.2)
PROT UR STRIP-MCNC: 100 MG/DL
RBC # BLD AUTO: 5.41 M/UL (ref 4.1–5.7)
RBC #/AREA URNS HPF: ABNORMAL /HPF (ref 0–5)
SAMPLES BEING HELD,HOLD: NORMAL
SERVICE CMNT-IMP: ABNORMAL
SERVICE CMNT-IMP: ABNORMAL
SERVICE CMNT-IMP: NORMAL
SODIUM SERPL-SCNC: 139 MMOL/L (ref 136–145)
SP GR UR REFRACTOMETRY: 1.03 (ref 1–1.03)
SPECIMEN EXP DATE BLD: NORMAL
UA: UC IF INDICATED,UAUC: ABNORMAL
UROBILINOGEN UR QL STRIP.AUTO: 0.2 EU/DL (ref 0.2–1)
WBC # BLD AUTO: 18.2 K/UL (ref 4.1–11.1)
WBC URNS QL MICRO: ABNORMAL /HPF (ref 0–4)

## 2019-10-29 PROCEDURE — 74011000258 HC RX REV CODE- 258: Performed by: INTERNAL MEDICINE

## 2019-10-29 PROCEDURE — 51702 INSERT TEMP BLADDER CATH: CPT

## 2019-10-29 PROCEDURE — 74011000250 HC RX REV CODE- 250: Performed by: INTERNAL MEDICINE

## 2019-10-29 PROCEDURE — 74011000250 HC RX REV CODE- 250: Performed by: EMERGENCY MEDICINE

## 2019-10-29 PROCEDURE — 96374 THER/PROPH/DIAG INJ IV PUSH: CPT

## 2019-10-29 PROCEDURE — 82962 GLUCOSE BLOOD TEST: CPT

## 2019-10-29 PROCEDURE — 80053 COMPREHEN METABOLIC PANEL: CPT

## 2019-10-29 PROCEDURE — 96375 TX/PRO/DX INJ NEW DRUG ADDON: CPT

## 2019-10-29 PROCEDURE — 85025 COMPLETE CBC W/AUTO DIFF WBC: CPT

## 2019-10-29 PROCEDURE — 99285 EMERGENCY DEPT VISIT HI MDM: CPT

## 2019-10-29 PROCEDURE — 65660000000 HC RM CCU STEPDOWN

## 2019-10-29 PROCEDURE — 83605 ASSAY OF LACTIC ACID: CPT

## 2019-10-29 PROCEDURE — 86900 BLOOD TYPING SEROLOGIC ABO: CPT

## 2019-10-29 PROCEDURE — C9113 INJ PANTOPRAZOLE SODIUM, VIA: HCPCS | Performed by: INTERNAL MEDICINE

## 2019-10-29 PROCEDURE — 74011250636 HC RX REV CODE- 250/636: Performed by: EMERGENCY MEDICINE

## 2019-10-29 PROCEDURE — 74011250637 HC RX REV CODE- 250/637: Performed by: INTERNAL MEDICINE

## 2019-10-29 PROCEDURE — 51798 US URINE CAPACITY MEASURE: CPT

## 2019-10-29 PROCEDURE — 74011250636 HC RX REV CODE- 250/636: Performed by: INTERNAL MEDICINE

## 2019-10-29 PROCEDURE — 81001 URINALYSIS AUTO W/SCOPE: CPT

## 2019-10-29 PROCEDURE — 94761 N-INVAS EAR/PLS OXIMETRY MLT: CPT

## 2019-10-29 PROCEDURE — 82272 OCCULT BLD FECES 1-3 TESTS: CPT

## 2019-10-29 PROCEDURE — C9113 INJ PANTOPRAZOLE SODIUM, VIA: HCPCS | Performed by: EMERGENCY MEDICINE

## 2019-10-29 PROCEDURE — 74022 RADEX COMPL AQT ABD SERIES: CPT

## 2019-10-29 PROCEDURE — 87804 INFLUENZA ASSAY W/OPTIC: CPT

## 2019-10-29 PROCEDURE — 36415 COLL VENOUS BLD VENIPUNCTURE: CPT

## 2019-10-29 PROCEDURE — 74176 CT ABD & PELVIS W/O CONTRAST: CPT

## 2019-10-29 RX ORDER — SODIUM CHLORIDE 0.9 % (FLUSH) 0.9 %
5-40 SYRINGE (ML) INJECTION AS NEEDED
Status: DISCONTINUED | OUTPATIENT
Start: 2019-10-29 | End: 2019-11-01 | Stop reason: HOSPADM

## 2019-10-29 RX ORDER — INSULIN GLARGINE 100 [IU]/ML
42 INJECTION, SOLUTION SUBCUTANEOUS DAILY
COMMUNITY
End: 2019-11-04

## 2019-10-29 RX ORDER — SODIUM CHLORIDE 0.9 % (FLUSH) 0.9 %
5-40 SYRINGE (ML) INJECTION EVERY 8 HOURS
Status: DISCONTINUED | OUTPATIENT
Start: 2019-10-29 | End: 2019-11-01 | Stop reason: HOSPADM

## 2019-10-29 RX ORDER — MAGNESIUM SULFATE 100 %
4 CRYSTALS MISCELLANEOUS AS NEEDED
Status: DISCONTINUED | OUTPATIENT
Start: 2019-10-29 | End: 2019-11-01 | Stop reason: HOSPADM

## 2019-10-29 RX ORDER — DEXTROSE MONOHYDRATE 50 MG/ML
25 INJECTION, SOLUTION INTRAVENOUS CONTINUOUS
Status: DISCONTINUED | OUTPATIENT
Start: 2019-10-29 | End: 2019-10-30

## 2019-10-29 RX ORDER — HYDROCODONE BITARTRATE AND ACETAMINOPHEN 5; 325 MG/1; MG/1
1 TABLET ORAL
Status: DISCONTINUED | OUTPATIENT
Start: 2019-10-29 | End: 2019-11-01 | Stop reason: HOSPADM

## 2019-10-29 RX ORDER — SODIUM CHLORIDE 9 MG/ML
100 INJECTION, SOLUTION INTRAVENOUS CONTINUOUS
Status: DISCONTINUED | OUTPATIENT
Start: 2019-10-29 | End: 2019-10-30

## 2019-10-29 RX ORDER — ONDANSETRON 2 MG/ML
4 INJECTION INTRAMUSCULAR; INTRAVENOUS
Status: DISCONTINUED | OUTPATIENT
Start: 2019-10-29 | End: 2019-11-01 | Stop reason: HOSPADM

## 2019-10-29 RX ORDER — ONDANSETRON 2 MG/ML
4 INJECTION INTRAMUSCULAR; INTRAVENOUS
Status: DISCONTINUED | OUTPATIENT
Start: 2019-10-29 | End: 2019-10-29

## 2019-10-29 RX ORDER — DOCUSATE SODIUM 100 MG/1
100 CAPSULE, LIQUID FILLED ORAL
Status: DISCONTINUED | OUTPATIENT
Start: 2019-10-29 | End: 2019-11-01 | Stop reason: HOSPADM

## 2019-10-29 RX ORDER — DEXTROSE 50 % IN WATER (D50W) INTRAVENOUS SYRINGE
12.5-25 AS NEEDED
Status: DISCONTINUED | OUTPATIENT
Start: 2019-10-29 | End: 2019-11-01 | Stop reason: HOSPADM

## 2019-10-29 RX ORDER — INSULIN LISPRO 100 [IU]/ML
INJECTION, SOLUTION INTRAVENOUS; SUBCUTANEOUS EVERY 6 HOURS
Status: DISCONTINUED | OUTPATIENT
Start: 2019-10-29 | End: 2019-11-01 | Stop reason: HOSPADM

## 2019-10-29 RX ADMIN — PANTOPRAZOLE SODIUM 40 MG: 40 INJECTION, POWDER, FOR SOLUTION INTRAVENOUS at 21:10

## 2019-10-29 RX ADMIN — SODIUM CHLORIDE 1000 ML: 900 INJECTION, SOLUTION INTRAVENOUS at 13:49

## 2019-10-29 RX ADMIN — ONDANSETRON 4 MG: 2 INJECTION INTRAMUSCULAR; INTRAVENOUS at 12:05

## 2019-10-29 RX ADMIN — PANTOPRAZOLE SODIUM 40 MG: 40 INJECTION, POWDER, FOR SOLUTION INTRAVENOUS at 12:06

## 2019-10-29 RX ADMIN — CEFTRIAXONE 1 G: 1 INJECTION, POWDER, FOR SOLUTION INTRAMUSCULAR; INTRAVENOUS at 19:08

## 2019-10-29 RX ADMIN — ONDANSETRON 4 MG: 2 INJECTION INTRAMUSCULAR; INTRAVENOUS at 22:45

## 2019-10-29 RX ADMIN — DEXTROSE MONOHYDRATE 25 ML/HR: 5 INJECTION, SOLUTION INTRAVENOUS at 21:24

## 2019-10-29 RX ADMIN — SODIUM CHLORIDE 1000 ML: 900 INJECTION, SOLUTION INTRAVENOUS at 12:06

## 2019-10-29 RX ADMIN — HYDROCODONE BITARTRATE AND ACETAMINOPHEN 1 TABLET: 5; 325 TABLET ORAL at 22:52

## 2019-10-29 RX ADMIN — SODIUM CHLORIDE 100 ML/HR: 900 INJECTION, SOLUTION INTRAVENOUS at 19:08

## 2019-10-29 NOTE — PROGRESS NOTES
Pharmacy Clarification of Prior to Admission Medication Regimen     The patient was interviewed regarding clarification of the prior to admission medication regimen and was questioned regarding use of any other inhalers, topical products, over the counter medications, herbal medications, vitamin products or ophthalmic/nasal/otic medication use. Information Obtained From: Patient, RX Query    Pertinent Pharmacy Findings: NONE    PTA medication list was corrected to the following:     Prior to Admission Medications   Prescriptions Last Dose Informant Patient Reported? Taking?   insulin glargine (LANTUS U-100 INSULIN) 100 unit/mL injection 10/29/2019 at Unknown time Self Yes Yes   Si Units by SubCUTAneous route daily. insulin regular (NOVOLIN R) 100 unit/mL injection 10/29/2019 at Unknown time Self Yes Yes   Si-10 Units by SubCUTAneous route Before breakfast, lunch, and dinner.  Blood Glucose (mg/dL)       Insulin Dose (units)              150-200                               2               201-250                               4               251-300                               6               301-350                               8               >351                                   10      Facility-Administered Medications: None          Thank you,  Bairon Mayer CPhT  Medication History Pharmacy Technician

## 2019-10-29 NOTE — PROGRESS NOTES
Called overnight regarding patient from ER. 37yo with uncontrolled DM, HbA1c >13. Also with bipolar, depression, seizures. Came in with right flank pain, n/v. Not evaluated by [facility] prior. Personally reviewed CT. Massive distention of bladder on CT with right hydronephrosis. PVR after 400cc void was >1000cc. Beard placed in ER. WBC 18. Cr 2.2. No requirement for right ureteral stent. Would plan to keep Beard in place for 7 days based on CT. Right hydro likely related to overdistention of bladder, possibly antegrade infection/ureteritis leading to dilation. Expect that hydronephrosis will resolve/improve with Beard. Will need renal ultrasound in ~72hours to re-evaluate hydronephrosis to ensure it has resolved with Beard catheterization. 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 Continue antibiotics. Follow up urine culture. If formal urology consult requested please reach out to [facility] during normal business hours tomorrow and we can see the patient.

## 2019-10-29 NOTE — ED PROVIDER NOTES
EMERGENCY DEPARTMENT HISTORY AND PHYSICAL EXAM      Date: 10/29/2019  Patient Name: Magdy Harmon  Patient Age and Sex: 41 y.o. male     History of Presenting Illness     Chief Complaint   Patient presents with    Vomiting    Rectal Bleeding    High Blood Sugar       History Provided By: Patient     HPI: Magdy Harmon Is a 29-year-old male with past medical history of diabetes presenting today with vomiting, abdominal discomfort, and dark stool. Patient reports that he has had several days of symptoms. He reports emesis that appeared coffee-ground. He states that his entire body feels crampy. He also says his stool has been dark. He has had multiple admissions for DKA. His family reported that he had high blood sugars at home in the 405 100s. Paramedics found his blood sugar to be 100. Patient denies any fever, cough. He denies any dysuria or hematuria. There are no other complaints, changes, or physical findings at this time. PCP: Elkin Aguillon NP    No current facility-administered medications on file prior to encounter. Current Outpatient Medications on File Prior to Encounter   Medication Sig Dispense Refill    insulin glargine (LANTUS U-100 INSULIN) 100 unit/mL injection 42 Units by SubCUTAneous route daily.  insulin regular (NOVOLIN R) 100 unit/mL injection 2-10 Units by SubCUTAneous route Before breakfast, lunch, and dinner. Blood Glucose (mg/dL)       Insulin Dose (units)              150-200                               2               201-250                               4               251-300                               6               301-350                               8               >351                                   10      [DISCONTINUED] oxyCODONE-acetaminophen (PERCOCET) 5-325 mg per tablet Take 1 Tab by mouth every four (4) hours as needed for Pain.       [DISCONTINUED] insulin NPH (HUMULIN N) 100 unit/mL (3 mL) inpn 23 Units by SubCUTAneous route Before breakfast and dinner. 5 Pen 0    [DISCONTINUED] insulin needles, disposable, 29 gauge x 1/2\" ndle Use to administer insulin twice a day. 60 Pen Needle 0    [DISCONTINUED] pantoprazole (PROTONIX) 40 mg tablet Take 1 Tab by mouth daily. 27 Tab 5       Past History     Past Medical History:  Past Medical History:   Diagnosis Date    [facility] depressed [facility] [address]     Bipolar disorder ([facility] [address]     Depression     Diabetes ([facility] [address]     DKA, type 1 ([facility] [address] 1/27/2013    diagnosed age 23    H/O noncompliance with medical treatment, presenting hazards to health     MRSA (methicillin resistant staph aureus) culture positive     MRSA (methicillin resistant Staphylococcus aureus)     Face    Noncompliance with medication regimen     Second hand smoke exposure     Seizure ([facility] [address]     Seizures ([facility] [address] 2006 or 2007    one episode during [facility]       Past Surgical History:  Past Surgical History:   Procedure Laterality Date    HX HEENT      top left wisdom tooth    HX ORTHOPAEDIC Left     wrist; MCV    UPPER GI ENDOSCOPY,BIOPSY  11/20/2018            Family History:  Family History   Problem Relation Age of Onset    Diabetes Mother     Diabetes Other         neice, type 1        Social History:  Social History     Tobacco Use    Smoking status: Current Some Day Smoker     Packs/day: 0.10     Years: 16.00     Pack years: 1.60     Types: Cigarettes    Smokeless tobacco: Never Used   Substance Use Topics    Alcohol use: No    Drug use: No       Allergies:  No Known Allergies      Review of Systems   Constitutional: No  fever,  No  headache  Skin: No  rash, No  jaundice  HEENT: No  nasal congestion, No  eye drainage.    Resp: No cough,  No  wheezing  CV: No chest pain, No  palpitations  GI: + vomiting,  +  diarrhea.,  No  constipation  : No dysuria,  No  hematuria  MSK: No joint pain,  No  trauma  Neuro: No numbness, No  tingling  Psych: No suicidal, No  paranoid      Physical Exam     Patient Vitals for the past 12 hrs:   Temp Pulse Resp BP SpO2   10/29/19 2045 -- 89 -- 112/81 98 %   10/29/19 2030 -- 89 -- 117/79 98 %   10/29/19 2015 -- 90 -- 123/87 99 %   10/29/19 2000 -- 93 -- (!) 149/93 100 %   10/29/19 1945 -- 92 -- (!) 157/91 99 %   10/29/19 1930 -- 93 -- (!) 150/97 99 %   10/29/19 1915 -- 100 -- 137/90 100 %   10/29/19 1900 -- 89 -- 117/79 95 %   10/29/19 1845 -- 95 -- 139/86 92 %   10/29/19 1815 -- 91 -- 128/75 97 %   10/29/19 1715 -- (!) 102 -- (!) 151/95 99 %   10/29/19 1630 -- (!) 104 -- (!) 141/92 100 %   10/29/19 1600 -- 98 -- (!) 133/98 95 %   10/29/19 1515 -- (!) 108 -- 135/86 98 %   10/29/19 1122 97.7 °F (36.5 °C) (!) 112 19 (!) 143/120 99 %     General: alert, No acute distress  Eyes: EOMI, normal conjunctiva  ENT: dry mucous membranes. Neck: Active, full ROM of neck. Skin: No rashes. no jaundice              Lungs: Equal chest expansion. no respiratory distress. clear to auscultation bilaterally No accessory muscle usage  Heart: tachycardic with a  regular rhythm     no peripheral edema   2+ radial pulses and DPs bilaterally  Abd:  non distended soft, Generalized tenderness. No rebound tenderness. + voluntary guarding  Back: Full ROM  MSK: Full, active ROM in all 4 extremities.    Neuro: Person, Place, Time and Situation; normal speech;   Psych: Cooperative with exam; Appropriate mood and affect             Diagnostic Study Results     Labs -     Recent Results (from the past 12 hour(s))   GLUCOSE, POC    Collection Time: 10/29/19 11:35 AM   Result Value Ref Range    Glucose (POC) 85 65 - 100 mg/dL    Performed by Ky Roberts (PCT)    GLUCOSE, POC    Collection Time: 10/29/19 11:38 AM   Result Value Ref Range    Glucose (POC) 80 65 - 100 mg/dL    Performed by Ky Roberst (PCT)    CBC WITH AUTOMATED DIFF    Collection Time: 10/29/19 12:16 PM   Result Value Ref Range    WBC 18.2 (H) 4.1 - 11.1 K/uL    RBC 5.41 4.10 - 5.70 M/uL    HGB 17.1 (H) 12.1 - 17.0 g/dL    HCT 47.6 36.6 - 50.3 %    MCV 88.0 80.0 - 99.0 FL    MCH 31.6 26.0 - 34.0 PG    MCHC 35.9 30.0 - 36.5 g/dL    RDW 12.7 11.5 - 14.5 %    PLATELET 506 (H) 084 - 400 K/uL    MPV 10.1 8.9 - 12.9 FL    NRBC 0.0 0  WBC    ABSOLUTE NRBC 0.00 0.00 - 0.01 K/uL    NEUTROPHILS 79 (H) 32 - 75 %    LYMPHOCYTES 12 12 - 49 %    MONOCYTES 9 5 - 13 %    EOSINOPHILS 0 0 - 7 %    BASOPHILS 0 0 - 1 %    IMMATURE GRANULOCYTES 0 0.0 - 0.5 %    ABS. NEUTROPHILS 14.2 (H) 1.8 - 8.0 K/UL    ABS. LYMPHOCYTES 2.2 0.8 - 3.5 K/UL    ABS. MONOCYTES 1.7 (H) 0.0 - 1.0 K/UL    ABS. EOSINOPHILS 0.0 0.0 - 0.4 K/UL    ABS. BASOPHILS 0.1 0.0 - 0.1 K/UL    ABS. IMM. GRANS. 0.1 (H) 0.00 - 0.04 K/UL    DF AUTOMATED     METABOLIC PANEL, COMPREHENSIVE    Collection Time: 10/29/19 12:16 PM   Result Value Ref Range    Sodium 139 136 - 145 mmol/L    Potassium 3.5 3.5 - 5.1 mmol/L    Chloride 94 (L) 97 - 108 mmol/L    CO2 31 21 - 32 mmol/L    Anion gap 14 5 - 15 mmol/L    Glucose 77 65 - 100 mg/dL    BUN 47 (H) 6 - 20 MG/DL    Creatinine 2.23 (H) 0.70 - 1.30 MG/DL    BUN/Creatinine ratio 21 (H) 12 - 20      GFR est AA 41 (L) >60 ml/min/1.73m2    GFR est non-AA 34 (L) >60 ml/min/1.73m2    Calcium 10.1 8.5 - 10.1 MG/DL    Bilirubin, total 0.7 0.2 - 1.0 MG/DL    ALT (SGPT) 68 12 - 78 U/L    AST (SGOT) 18 15 - 37 U/L    Alk.  phosphatase 262 (H) 45 - 117 U/L    Protein, total 8.2 6.4 - 8.2 g/dL    Albumin 3.5 3.5 - 5.0 g/dL    Globulin 4.7 (H) 2.0 - 4.0 g/dL    A-G Ratio 0.7 (L) 1.1 - 2.2     TYPE & SCREEN    Collection Time: 10/29/19 12:16 PM   Result Value Ref Range    Crossmatch Expiration 11/01/2019     ABO/Rh(D) A POSITIVE     Antibody screen NEG    INFLUENZA A & B AG (RAPID TEST)    Collection Time: 10/29/19 12:16 PM   Result Value Ref Range    Influenza A Antigen NEGATIVE  NEG      Influenza B Antigen NEGATIVE  NEG     SAMPLES BEING HELD    Collection Time: 10/29/19 12:16 PM   Result Value Ref Range    SAMPLES BEING HELD  BLUE     COMMENT        Add-on orders for these samples will be processed based on acceptable specimen integrity and analyte stability, which may vary by analyte. OCCULT BLOOD, STOOL    Collection Time: 10/29/19 12:16 PM   Result Value Ref Range    Occult blood, stool POSITIVE (A) NEG     URINALYSIS W/ REFLEX CULTURE    Collection Time: 10/29/19 12:37 PM   Result Value Ref Range    Color YELLOW/STRAW      Appearance CLEAR CLEAR      Specific gravity 1.027 1.003 - 1.030      pH (UA) 5.5 5.0 - 8.0      Protein 100 (A) NEG mg/dL    Glucose >1,000 (A) NEG mg/dL    Ketone 80 (A) NEG mg/dL    Bilirubin NEGATIVE  NEG      Blood NEGATIVE  NEG      Urobilinogen 0.2 0.2 - 1.0 EU/dL    Nitrites NEGATIVE  NEG      Leukocyte Esterase NEGATIVE  NEG      WBC 0-4 0 - 4 /hpf    RBC 0-5 0 - 5 /hpf    Epithelial cells FEW FEW /lpf    Bacteria NEGATIVE  NEG /hpf    UA:UC IF INDICATED CULTURE NOT INDICATED BY UA RESULT CNI      Hyaline cast 2-5 0 - 5 /lpf   GLUCOSE, POC    Collection Time: 10/29/19  9:03 PM   Result Value Ref Range    Glucose (POC) 46 (LL) 65 - 100 mg/dL    Performed by Rebecca Castellon, POC    Collection Time: 10/29/19  9:05 PM   Result Value Ref Range    Glucose (POC) 53 (L) 65 - 100 mg/dL    Performed by Rebecca Castellon, POC    Collection Time: 10/29/19  9:19 PM   Result Value Ref Range    Glucose (POC) 66 65 - 100 mg/dL    Performed by Renée Verdin        Radiologic Studies -   CT ABD PELV WO CONT   Final Result   IMPRESSION:      1. Massive distention of the bladder. Severe right-sided hydronephrosis which   may be due to reflux. Recommend post void ultrasound to confirm that the   right-sided hydronephrosis resolves. No left-sided hydronephrosis   2. Marked thickening of the distal esophagus which can be seen with esophagitis. XR ABD ACUTE W 1 V CHEST   Final Result   IMPRESSION:   1. No acute disease           CT Results  (Last 48 hours)               10/29/19 1332  CT ABD PELV WO CONT Final result    Impression:  IMPRESSION:       1.  Massive distention of the bladder. Severe right-sided hydronephrosis which   may be due to reflux. Recommend post void ultrasound to confirm that the   right-sided hydronephrosis resolves. No left-sided hydronephrosis   2. Marked thickening of the distal esophagus which can be seen with esophagitis. Narrative:  EXAM: CT ABD PELV WO CONT       INDICATION: Abdominal pain       COMPARISON: November 20, 2018       CONTRAST:  None. TECHNIQUE:    Thin axial images were obtained through the abdomen and pelvis. Coronal and   sagittal reconstructions were generated. Oral contrast was not administered. CT   dose reduction was achieved through use of a standardized protocol tailored for   this examination and automatic exposure control for dose modulation. The absence of intravenous contrast material reduces the sensitivity for   evaluation of the solid parenchymal organs of the abdomen. FINDINGS:    LUNG BASES: Clear. INCIDENTALLY IMAGED HEART AND MEDIASTINUM: Marked thickening of the distal   esophagus   LIVER: No mass or biliary dilatation. GALLBLADDER: Unremarkable. SPLEEN: No mass. PANCREAS: No mass or ductal dilatation. ADRENALS: Unremarkable. KIDNEYS/URETERS: Severe right-sided hydronephrosis. Right ureter is dilated as   well. No obstructing stone. No left-sided hydronephrosis   STOMACH: Unremarkable. SMALL BOWEL: No dilatation or wall thickening. COLON: No dilatation or wall thickening. APPENDIX: Not identified   PERITONEUM: No ascites or pneumoperitoneum. RETROPERITONEUM: No lymphadenopathy or aortic aneurysm. REPRODUCTIVE ORGANS: Not enlarged   URINARY BLADDER: Massively distended. No wall thickening or stone   BONES: No destructive bone lesion. ADDITIONAL COMMENTS: N/A               CXR Results  (Last 48 hours)               10/29/19 1245  XR ABD ACUTE W 1 V CHEST Final result    Impression:  IMPRESSION:   1.  No acute disease           Narrative:  INDICATION:  abd and chest pain, vomiting        EXAM: Frontal view of the chest and 2 views of the abdomen. Comparisons: 7/6/2019        FINDINGS: The cardiomediastinal silhouette is normal. Pulmonary vasculature is   not engorged. No pneumothorax, focal parenchymal opacity or effusion. There is   no free air. Bowel gas pattern is normal. No abnormal calcifications. Medical Decision Making     Differential Diagnosis: UTI, DKA, electrolyte derangement, gastroenteritis, influenza    I reviewed the vital signs, available nursing notes, past medical history, past surgical history, family history and social history and old medical records. On my interpretation, Laboratory workup is significant for white blood cell count is 18.2, creatinine elevated to 2.23, increased from a typically normal value, negative influenza test, stool is fecal occult positive, urinalysis without evidence of infection  On my interpretation of the radiology studies CT abdomen pelvis reveals distention of the bladder right-sided hydronephrosis, thickening of the distal esophagus    Management/ED course: Patient presents today appearing uncomfortable with abdominal discomfort and high blood sugars at home. No evidence of DKA, patient sugar is normal, he does have an elevation in creatinine. He had lower abdominal discomfort and was found to have an extremely distended urinary bladder on CT scan despite the fact that he had urinated without difficulty in the emergency department. Postvoid residual was greater than 250. Patient had a Beard that was placed. I did speak with urology who agree with Beard placement and do not feel that the patient needs further intervention at this time. Patient does have an elevated white blood cell count, but no evidence of clear infection. He is ultimately admitted to the hospitalist service. ED Course:   Initial assessment performed.  The patients presenting problems have been discussed, and they are in agreement with the care plan formulated and outlined with them. I have encouraged them to ask questions as they arise throughout their visit. ED Course as of Oct 29 2132   Tue Oct 29, 2019   1936 Blood: NEGATIVE  [NW]      ED Course User Index  [NW] Joaquim Rinne, MD     CONSULT NOTE:   6:28 PM  I spoke with urology  Specialty: urology  Discussed pt's hx, disposition, and available diagnostic and imaging results. Reviewed care plans. Plan: Agree with Beard and management per primary team.       Disposition: Admitted    Admission Note:  Patient is being admitted to the hospital by Service: Hospitalist.  The results of their tests and reasons for their admission have been discussed with them and available family. They convey agreement and understanding for the need to be admitted and for their admission diagnosis. Diagnosis     Clinical Impression:   1. Urinary retention    2. Acute kidney injury [facility])        Attestations:  Buddy Campos MD        Please note that this dictation was completed with [employer], the computer voice recognition software. Quite often unanticipated grammatical, syntax, homophones, and other interpretive errors are inadvertently transcribed by the computer software. Please disregard these errors. Please excuse any errors that have escaped final proofreading. Thank you.

## 2019-10-29 NOTE — ED NOTES
Pt presents via ems from home with c/o coffee ground emesis and stool since Sunday. Pt c/o general abd pain and family reports pt has not been compliant with diabetes meds. BS have been running in the 400's.

## 2019-10-29 NOTE — H&P
Hospitalist Admission Note    NAME: Linda Adams   :  1980   MRN:  [id number]     Date/Time:  10/29/2019 7:02 PM    Patient PCP: Sunday Pitt NP  ________________________________________________________________________    My assessment of this patient's clinical condition and my plan of care is as follows. Assessment / Plan:  Severe right-sided hydronephrosis with massive distention of the bladder  Sepsis due to hydronephrosis  -Check stat lactic acid.  Start empiric ceftriaxone.  Start IV fluids with normal saline. -ER physician Dr. Sea Bliss is calling urology  -Keep n.p.o. from midnight  -Pain control    Incidental finding of esophagitis on CT abdomen  -Patient also reports some nausea and vomiting  -We will start empiric PPI  -We will need outpatient follow-up with gastroenterology    Diabetes mellitus  -Patient reports that blood sugars were high at home but currently blood sugars are borderline around 80s  -We will hold off on his home Lantus for now due to hypoglycemia  -Start insulin sliding scale with blood sugar checks  -If blood sugars continue to be low, consider adding dextrose to IV fluids    Acute kidney injury likely related to obstruction  -Baseline creatinine is around 1 and currently creatinine is 2.23  -Beard has been placed in the ED  -Continue IV fluids and repeat BMP in a.m. History of bipolar disorder  Depression  History of seizures  Consult pharmacy for medication reconciliation        Code Status: full  Surrogate Decision Maker:  Mother Khadijah    DVT Prophylaxis: Scd's due to procedure in am   GI Prophylaxis: not indicated    Baseline: from home independent        Subjective:   CHIEF COMPLAINT: Abdominal pain, N/V    HISTORY OF PRESENT ILLNESS:     This is a 30-year-old male with past medical history of diabetes is coming to the hospital chief complaints of nausea, vomiting and right-sided abdominal pain since the last 3 days.  Patient reports being in his usual state of health until about 2 days ago when he started having right-sided flank pain which is 7 x 10, increased with movement and without any relieving factors.  He also reports nausea along with clear vomit.  He was also having high blood sugars at home. [facility] also had one episode of dark stool.  He does not report any chest pain or shortness of breath. On arrival to the hospital he was noted to be tachycardic and blood pressure was 143 x 120.  On lab work he was noted to have a hemoglobin of 17.1, leukocytosis of 18.2.  BMP showed a creatinine of 2.23. [facility] had a CT abdomen in the emergency department which showed evidence of severe right-sided hydronephrosis with massive distention of the bladder.  CT abdomen also showed marked thickening of the distal esophagus which can be seen with esophagitis. We were asked to admit for work up and evaluation of the above problems.      Past Medical History:   Diagnosis Date    [facility]     [facility]     Depression     Diabetes [facility]     DKA, type 1 ([facility] 1/27/2013    diagnosed age 23    H/O noncompliance with medical treatment, presenting hazards to health     MRSA (methicillin resistant staph aureus) culture positive     MRSA (methicillin resistant Staphylococcus aureus)     Face    Noncompliance with medication regimen     Second hand smoke exposure     Seizure ([facility]     Seizures ([facility] 2006 or 2007    one episode during halfway        Past Surgical History:   Procedure Laterality Date    HX HEENT      top left wisdom tooth    HX ORTHOPAEDIC Left     wrist; MCV    UPPER GI ENDOSCOPY,BIOPSY  11/20/2018            Social History     Tobacco Use    Smoking status: Current Some Day Smoker     Packs/day: 0.10     Years: 16.00     Pack years: 1.60     Types: Cigarettes    Smokeless tobacco: Never Used   Substance Use Topics    Alcohol use: No        Family History   Problem Relation Age of Onset    Diabetes Mother     Diabetes Other         neice, type 1      No Known Allergies     Prior to Admission medications    Medication Sig Start Date End Date Taking? Authorizing Provider   insulin glargine (LANTUS U-100 INSULIN) 100 unit/mL injection 42 Units by SubCUTAneous route daily. Yes Provider, Historical   insulin regular (NOVOLIN R) 100 unit/mL injection 2-10 Units by SubCUTAneous route Before breakfast, lunch, and dinner. Blood Glucose (mg/dL)       Insulin Dose (units)              150-200                               2               201-250                               4               251-300                               6               301-350                               8               >351                                   10   Yes Provider, Historical       REVIEW OF SYSTEMS:     I am not able to complete the review of systems because:    The patient is intubated and sedated    The patient has altered mental status due to his acute medical problems    The patient has baseline aphasia from prior stroke(s)    The patient has baseline dementia and is not reliable historian    The patient is in acute medical distress and unable to provide information           Total of 12 systems reviewed as follows:       POSITIVE= underlined text  Negative = text not underlined  General:  fever, chills, sweats, generalized weakness, weight loss/gain,      loss of appetite   Eyes:    blurred vision, eye pain, loss of vision, double vision  ENT:    rhinorrhea, pharyngitis   Respiratory:   cough, sputum production, SOB, [place], wheezing, pleuritic pain   Cardiology:   chest pain, palpitations, orthopnea, PND, edema, syncope   Gastrointestinal:  abdominal pain , N/V, diarrhea, dysphagia, constipation, bleeding   Genitourinary:  frequency, urgency, dysuria, hematuria, incontinence   Muskuloskeletal :  arthralgia, myalgia, back pain  Hematology:  easy bruising, nose or gum bleeding, lymphadenopathy   Dermatological: rash, ulceration, pruritis, color change / jaundice  Endocrine:   hot flashes or polydipsia   Neurological:  headache, dizziness, confusion, focal weakness, paresthesia,     Speech difficulties, memory loss, gait difficulty  Psychological: Feelings of anxiety, depression, agitation    Objective:   VITALS:    Visit Vitals  /75   Pulse 91   Temp 97.7 °F (36.5 °C)   Resp 19   Ht 5' 5\" (1.651 m)   Wt 59 kg (130 lb)   SpO2 97%   BMI 21.63 kg/m²       PHYSICAL EXAM:    General:    Alert, cooperative, no distress, appears stated age. HEENT: Atraumatic, anicteric sclerae, pink conjunctivae     No oral ulcers, mucosa moist  Neck:  Supple, symmetrical,  thyroid: non tender  Lungs:   Clear to auscultation bilaterally. No Wheezing or Rhonchi. No rales. Chest wall:  No tenderness  No Accessory muscle use. Heart:   Regular  rhythm,  No  murmur   No edema  Abdomen:   Soft, Rt sided hydronephrosis, no guarding or rigidity  Extremities: No cyanosis. No clubbing,      Skin turgor normal, Capillary refill normal, Radial dial pulse 2+  Skin:     Not Jaundiced  No rashes   Psych:  Not anxious or agitated. Neurologic: EOMs intact. No facial asymmetry. No aphasia or slurred speech. Symmetrical strength, Sensation grossly intact.  Alert and oriented X 4.     _______________________________________________________________________  Care Plan discussed with:    Comments   Patient y    Family      RN y    Care Manager                    Consultant:      _______________________________________________________________________  Expected  Disposition:   Home with Family y   HH/PT/OT/RN    SNF/LTC    CATHIE    ________________________________________________________________________  TOTAL TIME:  61  Minutes    Critical Care Provided     Minutes non procedure based      Comments    y Reviewed previous records   >50% of visit spent in counseling and coordination of care y Discussion with patient and/or family and questions answered ________________________________________________________________________  Signed: Maren Frankel, MD    Procedures: see electronic medical records for all procedures/Xrays and details which were not copied into this note but were reviewed prior to creation of Plan. LAB DATA REVIEWED:    Recent Results (from the past 24 hour(s))   GLUCOSE, POC    Collection Time: 10/29/19 11:35 AM   Result Value Ref Range    Glucose (POC) 85 65 - 100 mg/dL    Performed by Brandon Quintanilla (PCT)    GLUCOSE, POC    Collection Time: 10/29/19 11:38 AM   Result Value Ref Range    Glucose (POC) 80 65 - 100 mg/dL    Performed by Brandon Quintanilla (PCT)    CBC WITH AUTOMATED DIFF    Collection Time: 10/29/19 12:16 PM   Result Value Ref Range    WBC 18.2 (H) 4.1 - 11.1 K/uL    RBC 5.41 4.10 - 5.70 M/uL    HGB 17.1 (H) 12.1 - 17.0 g/dL    HCT 47.6 36.6 - 50.3 %    MCV 88.0 80.0 - 99.0 FL    MCH 31.6 26.0 - 34.0 PG    MCHC 35.9 30.0 - 36.5 g/dL    RDW 12.7 11.5 - 14.5 %    PLATELET 102 (H) 812 - 400 K/uL    MPV 10.1 8.9 - 12.9 FL    NRBC 0.0 0  WBC    ABSOLUTE NRBC 0.00 0.00 - 0.01 K/uL    NEUTROPHILS 79 (H) 32 - 75 %    LYMPHOCYTES 12 12 - 49 %    MONOCYTES 9 5 - 13 %    EOSINOPHILS 0 0 - 7 %    BASOPHILS 0 0 - 1 %    IMMATURE GRANULOCYTES 0 0.0 - 0.5 %    ABS. NEUTROPHILS 14.2 (H) 1.8 - 8.0 K/UL    ABS. LYMPHOCYTES 2.2 0.8 - 3.5 K/UL    ABS. MONOCYTES 1.7 (H) 0.0 - 1.0 K/UL    ABS. EOSINOPHILS 0.0 0.0 - 0.4 K/UL    ABS. BASOPHILS 0.1 0.0 - 0.1 K/UL    ABS. IMM.  GRANS. 0.1 (H) 0.00 - 0.04 K/UL    DF AUTOMATED     METABOLIC PANEL, COMPREHENSIVE    Collection Time: 10/29/19 12:16 PM   Result Value Ref Range    Sodium 139 136 - 145 mmol/L    Potassium 3.5 3.5 - 5.1 mmol/L    Chloride 94 (L) 97 - 108 mmol/L    CO2 31 21 - 32 mmol/L    Anion gap 14 5 - 15 mmol/L    Glucose 77 65 - 100 mg/dL    BUN 47 (H) 6 - 20 MG/DL    Creatinine 2.23 (H) 0.70 - 1.30 MG/DL    BUN/Creatinine ratio 21 (H) 12 - 20      GFR est AA 41 (L) >60 ml/min/1.73m2 GFR est non-AA 34 (L) >60 ml/min/1.73m2    Calcium 10.1 8.5 - 10.1 MG/DL    Bilirubin, total 0.7 0.2 - 1.0 MG/DL    ALT (SGPT) 68 12 - 78 U/L    AST (SGOT) 18 15 - 37 U/L    Alk. phosphatase 262 (H) 45 - 117 U/L    Protein, total 8.2 6.4 - 8.2 g/dL    Albumin 3.5 3.5 - 5.0 g/dL    Globulin 4.7 (H) 2.0 - 4.0 g/dL    A-G Ratio 0.7 (L) 1.1 - 2.2     TYPE & SCREEN    Collection Time: 10/29/19 12:16 PM   Result Value Ref Range    Crossmatch Expiration 11/01/2019     ABO/Rh(D) A POSITIVE     Antibody screen NEG    INFLUENZA A & B AG (RAPID TEST)    Collection Time: 10/29/19 12:16 PM   Result Value Ref Range    Influenza A Antigen NEGATIVE  NEG      Influenza B Antigen NEGATIVE  NEG     SAMPLES BEING HELD    Collection Time: 10/29/19 12:16 PM   Result Value Ref Range    SAMPLES BEING HELD  BLUE     COMMENT        Add-on orders for these samples will be processed based on acceptable specimen integrity and analyte stability, which may vary by analyte.    OCCULT BLOOD, STOOL    Collection Time: 10/29/19 12:16 PM   Result Value Ref Range    Occult blood, stool POSITIVE (A) NEG     URINALYSIS W/ REFLEX CULTURE    Collection Time: 10/29/19 12:37 PM   Result Value Ref Range    Color YELLOW/STRAW      Appearance CLEAR CLEAR      Specific gravity 1.027 1.003 - 1.030      pH (UA) 5.5 5.0 - 8.0      Protein 100 (A) NEG mg/dL    Glucose >1,000 (A) NEG mg/dL    Ketone 80 (A) NEG mg/dL    Bilirubin NEGATIVE  NEG      Blood NEGATIVE  NEG      Urobilinogen 0.2 0.2 - 1.0 EU/dL    Nitrites NEGATIVE  NEG      Leukocyte Esterase NEGATIVE  NEG      WBC 0-4 0 - 4 /hpf    RBC 0-5 0 - 5 /hpf    Epithelial cells FEW FEW /lpf    Bacteria NEGATIVE  NEG /hpf    UA:UC IF INDICATED CULTURE NOT INDICATED BY UA RESULT CNI      Hyaline cast 2-5 0 - 5 /lpf

## 2019-10-29 NOTE — ED NOTES
Bladder scan performed, pt with 1000 ml in bladder. Pt urinated 400 ml. Post void scan revealed 1000 ml.  Discussed with Dr. Ave Coffman

## 2019-10-30 LAB
ANION GAP SERPL CALC-SCNC: 7 MMOL/L (ref 5–15)
BASOPHILS # BLD: 0 K/UL (ref 0–0.1)
BASOPHILS NFR BLD: 0 % (ref 0–1)
BUN SERPL-MCNC: 36 MG/DL (ref 6–20)
BUN/CREAT SERPL: 32 (ref 12–20)
CALCIUM SERPL-MCNC: 8.4 MG/DL (ref 8.5–10.1)
CHLORIDE SERPL-SCNC: 101 MMOL/L (ref 97–108)
CO2 SERPL-SCNC: 31 MMOL/L (ref 21–32)
CREAT SERPL-MCNC: 1.11 MG/DL (ref 0.7–1.3)
DIFFERENTIAL METHOD BLD: ABNORMAL
EOSINOPHIL # BLD: 0 K/UL (ref 0–0.4)
EOSINOPHIL NFR BLD: 0 % (ref 0–7)
ERYTHROCYTE [DISTWIDTH] IN BLOOD BY AUTOMATED COUNT: 12.8 % (ref 11.5–14.5)
GLUCOSE BLD STRIP.AUTO-MCNC: 102 MG/DL (ref 65–100)
GLUCOSE BLD STRIP.AUTO-MCNC: 126 MG/DL (ref 65–100)
GLUCOSE BLD STRIP.AUTO-MCNC: 89 MG/DL (ref 65–100)
GLUCOSE BLD STRIP.AUTO-MCNC: 96 MG/DL (ref 65–100)
GLUCOSE SERPL-MCNC: 96 MG/DL (ref 65–100)
HCT VFR BLD AUTO: 36.6 % (ref 36.6–50.3)
HGB BLD-MCNC: 12.7 G/DL (ref 12.1–17)
IMM GRANULOCYTES # BLD AUTO: 0.1 K/UL (ref 0–0.04)
IMM GRANULOCYTES NFR BLD AUTO: 1 % (ref 0–0.5)
LYMPHOCYTES # BLD: 2.1 K/UL (ref 0.8–3.5)
LYMPHOCYTES NFR BLD: 14 % (ref 12–49)
MCH RBC QN AUTO: 31.6 PG (ref 26–34)
MCHC RBC AUTO-ENTMCNC: 34.7 G/DL (ref 30–36.5)
MCV RBC AUTO: 91 FL (ref 80–99)
MONOCYTES # BLD: 1.1 K/UL (ref 0–1)
MONOCYTES NFR BLD: 7 % (ref 5–13)
NEUTS SEG # BLD: 11.9 K/UL (ref 1.8–8)
NEUTS SEG NFR BLD: 79 % (ref 32–75)
NRBC # BLD: 0 K/UL (ref 0–0.01)
NRBC BLD-RTO: 0 PER 100 WBC
PLATELET # BLD AUTO: 374 K/UL (ref 150–400)
PMV BLD AUTO: 10.2 FL (ref 8.9–12.9)
POTASSIUM SERPL-SCNC: 3 MMOL/L (ref 3.5–5.1)
RBC # BLD AUTO: 4.02 M/UL (ref 4.1–5.7)
SERVICE CMNT-IMP: ABNORMAL
SERVICE CMNT-IMP: ABNORMAL
SERVICE CMNT-IMP: NORMAL
SERVICE CMNT-IMP: NORMAL
SODIUM SERPL-SCNC: 139 MMOL/L (ref 136–145)
WBC # BLD AUTO: 15.2 K/UL (ref 4.1–11.1)

## 2019-10-30 PROCEDURE — C9113 INJ PANTOPRAZOLE SODIUM, VIA: HCPCS | Performed by: INTERNAL MEDICINE

## 2019-10-30 PROCEDURE — 74011250636 HC RX REV CODE- 250/636: Performed by: INTERNAL MEDICINE

## 2019-10-30 PROCEDURE — 74011000258 HC RX REV CODE- 258: Performed by: INTERNAL MEDICINE

## 2019-10-30 PROCEDURE — 82962 GLUCOSE BLOOD TEST: CPT

## 2019-10-30 PROCEDURE — 36415 COLL VENOUS BLD VENIPUNCTURE: CPT

## 2019-10-30 PROCEDURE — 94760 N-INVAS EAR/PLS OXIMETRY 1: CPT

## 2019-10-30 PROCEDURE — 74011250637 HC RX REV CODE- 250/637: Performed by: INTERNAL MEDICINE

## 2019-10-30 PROCEDURE — 87040 BLOOD CULTURE FOR BACTERIA: CPT

## 2019-10-30 PROCEDURE — 65660000000 HC RM CCU STEPDOWN

## 2019-10-30 PROCEDURE — 85025 COMPLETE CBC W/AUTO DIFF WBC: CPT

## 2019-10-30 PROCEDURE — 74011000250 HC RX REV CODE- 250: Performed by: INTERNAL MEDICINE

## 2019-10-30 PROCEDURE — 80048 BASIC METABOLIC PNL TOTAL CA: CPT

## 2019-10-30 RX ORDER — DEXTROSE MONOHYDRATE AND SODIUM CHLORIDE 5; .9 G/100ML; G/100ML
75 INJECTION, SOLUTION INTRAVENOUS CONTINUOUS
Status: DISCONTINUED | OUTPATIENT
Start: 2019-10-30 | End: 2019-11-01 | Stop reason: HOSPADM

## 2019-10-30 RX ORDER — POTASSIUM CHLORIDE 750 MG/1
40 TABLET, FILM COATED, EXTENDED RELEASE ORAL
Status: COMPLETED | OUTPATIENT
Start: 2019-10-30 | End: 2019-10-30

## 2019-10-30 RX ADMIN — Medication 10 ML: at 06:07

## 2019-10-30 RX ADMIN — POTASSIUM CHLORIDE 40 MEQ: 750 TABLET, FILM COATED, EXTENDED RELEASE ORAL at 15:49

## 2019-10-30 RX ADMIN — Medication 10 ML: at 21:49

## 2019-10-30 RX ADMIN — ONDANSETRON 4 MG: 2 INJECTION INTRAMUSCULAR; INTRAVENOUS at 08:50

## 2019-10-30 RX ADMIN — Medication 10 ML: at 15:09

## 2019-10-30 RX ADMIN — HYDROCODONE BITARTRATE AND ACETAMINOPHEN 1 TABLET: 5; 325 TABLET ORAL at 08:50

## 2019-10-30 RX ADMIN — DEXTROSE MONOHYDRATE AND SODIUM CHLORIDE 75 ML/HR: 5; .9 INJECTION, SOLUTION INTRAVENOUS at 15:54

## 2019-10-30 RX ADMIN — ONDANSETRON 4 MG: 2 INJECTION INTRAMUSCULAR; INTRAVENOUS at 15:32

## 2019-10-30 RX ADMIN — PANTOPRAZOLE SODIUM 40 MG: 40 INJECTION, POWDER, FOR SOLUTION INTRAVENOUS at 21:48

## 2019-10-30 RX ADMIN — CEFTRIAXONE 1 G: 1 INJECTION, POWDER, FOR SOLUTION INTRAMUSCULAR; INTRAVENOUS at 17:59

## 2019-10-30 RX ADMIN — HYDROCODONE BITARTRATE AND ACETAMINOPHEN 1 TABLET: 5; 325 TABLET ORAL at 21:52

## 2019-10-30 RX ADMIN — HYDROCODONE BITARTRATE AND ACETAMINOPHEN 1 TABLET: 5; 325 TABLET ORAL at 15:48

## 2019-10-30 NOTE — DIABETES MGMT
[facility]    D[facility] Progress Note    Recommendations/ Comments: Patient with TYPE 1 diabetes- please consider Lantus -46 units at bedtime. This is his home basal dose    Called and spoke with Erasto Islas- states that patient is currently on D5- discussed how given patient with Type 1 diabetes- he still has basal insulin on board from yesterday, but he will need basal insulin tonight. Chart reviewed on Lucero Zhang. Patient is a 41 y.o. male with known TYPE 1 DIABETES- on Lantus 46 units daily,  And Novolog 2-10 units ac meals at home. A1c:   Lab Results   Component Value Date/Time    Hemoglobin A1c 13.0 (H) 07/06/2019 01:38 PM    Hemoglobin A1c 13.8 (H) 04/15/2019 06:22 PM       Recent Glucose Results:   Lab Results   Component Value Date/Time    GLU 96 10/30/2019 03:01 AM    GLUCPOC 102 (H) 10/30/2019 12:52 PM    GLUCPOC 89 10/30/2019 06:03 AM    GLUCPOC 126 (H) 10/30/2019 12:00 AM        Lab Results   Component Value Date/Time    Creatinine 1.11 10/30/2019 03:01 AM     Estimated Creatinine Clearance: 76.8 mL/min (based on SCr of 1.11 mg/dL). Active Orders   Diet    DIET DIABETIC WITH OPTIONS Consistent Carb 1800kcal; Regular        PO intake:   Patient Vitals for the past 72 hrs:   % Diet Eaten   10/30/19 1259 0 %   10/30/19 0950 0 %       Current hospital DM medication: [employer] insulin with normal sensitivity      Will continue to follow as needed. Thank you.         Time spent: 10 minutes  Jenny Woodson, 68 [address], [address]  Office:  [phone]

## 2019-10-30 NOTE — PROGRESS NOTES
* No surgery found *  * No surgery found *  Bedside and Verbal shift change report given to augusto (oncoming nurse) by randa (offgoing nurse). Report included the following information SBAR, Kardex, Recent Results and Med Rec Status. Zone Phone:   [phone]      Significant changes during shift:  New admit         Patient Information    Magdy Harmon  41 y.o.  10/29/2019 11:12 AM by Eric Luna MD. Magdy aHrmon was admitted from Home    Problem List    Patient Active Problem List    Diagnosis Date Noted    Hydronephrosis of right kidney 10/29/2019    DKA (diabetic ketoacidoses) ([address] 07/06/2019    Nausea with vomiting 04/16/2019    DKA, type 1, not at goal [facility]) 04/14/2019    DKA, type 1 ([address] 11/18/2018    Neuropathy 11/18/2018    Upper GI bleed 11/18/2018    Tachycardia 02/02/2014    Diabetic neuropathy, type I diabetes mellitus ([address] 02/02/2014    Tobacco abuse 08/18/2011     Class: Chronic    HTN (hypertension) 03/13/2011     Past Medical History:   Diagnosis Date    Bipolar 1 disorder, depressed ([address]     Bipolar disorder ([address]     Depression     Diabetes ([address]     DKA, type 1 ([address] 1/27/2013    diagnosed age 23    H/O noncompliance with medical treatment, presenting hazards to health     MRSA (methicillin resistant staph aureus) culture positive     MRSA (methicillin resistant Staphylococcus aureus)     Face    Noncompliance with medication regimen     Second hand smoke exposure     Seizure ([address]     Seizures ([address] 2006 or 2007    one episode during long-term         Core Measures:    CVA: No No  CHF:No No  PNA:No No        Activity Status:    OOB to Chair No  Ambulated this shift No   Bed Rest No    Supplemental O2: (If Applicable)    NC No  NRB No  Venti-mask No  On  Liters/min      LINES AND DRAINS:    Central Line? No Placement date  Reason Medically Necessary     PICC LINE? No Placement date Reason Medically Necessary     Urinary Catheter?  No Placement Date  Reason Medically Necessary     DVT prophylaxis:    DVT prophylaxis Med- No  DVT prophylaxis SCD or KEVIN- No     Wounds: (If Applicable)    Wounds- No    Location     Patient Safety:    Falls Score Total Score: 2  Safety Level_______  Bed Alarm On? No  Sitter?  No    Plan for upcoming shift: antibiotics        Discharge Plan: No     Active Consults:  IP CONSULT TO UROLOGY

## 2019-10-30 NOTE — ED NOTES
HYPOGLYCEMIC EPISODE DOCUMENTATION    Patient with hypoglycemic episode(s) at 2109(time) on 10/29date). BG value(s) pre-treatment 48    Was patient symptomatic?  [] yes, [x] no  Patient was treated with the following rescue medications/treatments: [] D50                [] Glucose tablets                [] Glucagon                [x] 4oz juice                [] 6oz reg soda                [] 8oz low fat milk  BG value post-treatment  66  Once BG treated and value greater than 80mg/dl, pt was provided with the following:  [] snack  [] meal  Name of MD notified:Tiffani   The following orders were received: d50

## 2019-10-30 NOTE — PROGRESS NOTES
Problem: Falls - Risk of  Goal: *Absence of Falls  Description  Document Tamra Joseph Fall Risk and appropriate interventions in the flowsheet. Outcome: Progressing Towards Goal  Note:   Fall Risk Interventions:            Medication Interventions: Bed/chair exit alarm    Elimination Interventions: Bed/chair exit alarm              Problem: Patient Education: Go to Patient Education Activity  Goal: Patient/Family Education  Outcome: Progressing Towards Goal     Problem: Pressure Injury - Risk of  Goal: *Prevention of pressure injury  Description  Document Corey Scale and appropriate interventions in the flowsheet.   Outcome: Progressing Towards Goal  Note:   Pressure Injury Interventions:  Sensory Interventions: Assess changes in LOC    Moisture Interventions: Absorbent underpads    Activity Interventions: Increase time out of bed    Mobility Interventions: Pressure redistribution bed/mattress (bed type)    Nutrition Interventions: Document food/fluid/supplement intake    Friction and Shear Interventions: Foam dressings/transparent film/skin sealants                Problem: Patient Education: Go to Patient Education Activity  Goal: Patient/Family Education  Outcome: Progressing Towards Goal     Problem: Patient Education: Go to Patient Education Activity  Goal: Patient/Family Education  Outcome: Progressing Towards Goal     Problem: Sepsis: Day of Diagnosis  Goal: Off Pathway (Use only if patient is Off Pathway)  Outcome: Progressing Towards Goal  Goal: *Fluid resuscitation  Outcome: Progressing Towards Goal  Goal: *Paired blood cultures prior to first dose of antibiotic  Outcome: Progressing Towards Goal  Goal: *First dose of  appropriate antibiotic within 3 hours of arrival to ED, within 1 hour of arrival to ICU  Outcome: Progressing Towards Goal  Goal: *Lactic acid with first set of blood cultures  Outcome: Progressing Towards Goal  Goal: *Pneumococcal immunization (if eligible)  Outcome: Progressing Towards Goal  Goal: *Influenza immunization (if eligible)  Outcome: Progressing Towards Goal  Goal: Activity/Safety  Outcome: Progressing Towards Goal  Goal: Consults, if ordered  Outcome: Progressing Towards Goal  Goal: Diagnostic Test/Procedures  Outcome: Progressing Towards Goal  Goal: Nutrition/Diet  Outcome: Progressing Towards Goal  Goal: Discharge Planning  Outcome: Progressing Towards Goal  Goal: Medications  Outcome: Progressing Towards Goal  Goal: Respiratory  Outcome: Progressing Towards Goal  Goal: Treatments/Interventions/Procedures  Outcome: Progressing Towards Goal  Goal: Psychosocial  Outcome: Progressing Towards Goal     Problem: Sepsis: Day 2  Goal: Off Pathway (Use only if patient is Off Pathway)  Outcome: Progressing Towards Goal  Goal: *Central Venous Pressure maintained at 8-12 mm Hg  Outcome: Progressing Towards Goal  Goal: *Hemodynamically stable  Outcome: Progressing Towards Goal  Goal: *Tolerating diet  Outcome: Progressing Towards Goal  Goal: Activity/Safety  Outcome: Progressing Towards Goal  Goal: Consults, if ordered  Outcome: Progressing Towards Goal  Goal: Diagnostic Test/Procedures  Outcome: Progressing Towards Goal  Goal: Nutrition/Diet  Outcome: Progressing Towards Goal  Goal: Discharge Planning  Outcome: Progressing Towards Goal  Goal: Medications  Outcome: Progressing Towards Goal  Goal: Respiratory  Outcome: Progressing Towards Goal  Goal: Treatments/Interventions/Procedures  Outcome: Progressing Towards Goal  Goal: Psychosocial  Outcome: Progressing Towards Goal     Problem: Sepsis: Day 3  Goal: Off Pathway (Use only if patient is Off Pathway)  Outcome: Progressing Towards Goal  Goal: *Central Venous Pressure maintained at 8-12 mm Hg  Outcome: Progressing Towards Goal  Goal: *Oxygen saturation within defined limits  Outcome: Progressing Towards Goal  Goal: *Vital sign stability  Outcome: Progressing Towards Goal  Goal: *Tolerating diet  Outcome: Progressing Towards Goal  Goal: *Demonstrates progressive activity  Outcome: Progressing Towards Goal  Goal: Activity/Safety  Outcome: Progressing Towards Goal  Goal: Consults, if ordered  Outcome: Progressing Towards Goal  Goal: Diagnostic Test/Procedures  Outcome: Progressing Towards Goal  Goal: Nutrition/Diet  Outcome: Progressing Towards Goal  Goal: Discharge Planning  Outcome: Progressing Towards Goal  Goal: Medications  Outcome: Progressing Towards Goal  Goal: Respiratory  Outcome: Progressing Towards Goal  Goal: Treatments/Interventions/Procedures  Outcome: Progressing Towards Goal  Goal: Psychosocial  Outcome: Progressing Towards Goal     Problem: Sepsis: Day 4  Goal: Off Pathway (Use only if patient is Off Pathway)  Outcome: Progressing Towards Goal  Goal: Activity/Safety  Outcome: Progressing Towards Goal  Goal: Consults, if ordered  Outcome: Progressing Towards Goal  Goal: Diagnostic Test/Procedures  Outcome: Progressing Towards Goal  Goal: Nutrition/Diet  Outcome: Progressing Towards Goal  Goal: Discharge Planning  Outcome: Progressing Towards Goal  Goal: Medications  Outcome: Progressing Towards Goal  Goal: Respiratory  Outcome: Progressing Towards Goal  Goal: Treatments/Interventions/Procedures  Outcome: Progressing Towards Goal  Goal: Psychosocial  Outcome: Progressing Towards Goal  Goal: *Oxygen saturation within defined limits  Outcome: Progressing Towards Goal  Goal: *Hemodynamically stable  Outcome: Progressing Towards Goal  Goal: *Vital signs within defined limits  Outcome: Progressing Towards Goal  Goal: *Tolerating diet  Outcome: Progressing Towards Goal  Goal: *Demonstrates progressive activity  Outcome: Progressing Towards Goal  Goal: *Fluid volume maintenance  Outcome: Progressing Towards Goal     Problem: Sepsis: Day 5  Goal: Off Pathway (Use only if patient is Off Pathway)  Outcome: Progressing Towards Goal  Goal: *Oxygen saturation within defined limits  Outcome: Progressing Towards Goal  Goal: *Vital signs within defined limits  Outcome: Progressing Towards Goal  Goal: *Tolerating diet  Outcome: Progressing Towards Goal  Goal: *Demonstrates progressive activity  Outcome: Progressing Towards Goal  Goal: *Discharge plan identified  Outcome: Progressing Towards Goal  Goal: Activity/Safety  Outcome: Progressing Towards Goal  Goal: Consults, if ordered  Outcome: Progressing Towards Goal  Goal: Diagnostic Test/Procedures  Outcome: Progressing Towards Goal  Goal: Nutrition/Diet  Outcome: Progressing Towards Goal  Goal: Discharge Planning  Outcome: Progressing Towards Goal  Goal: Medications  Outcome: Progressing Towards Goal  Goal: Respiratory  Outcome: Progressing Towards Goal  Goal: Treatments/Interventions/Procedures  Outcome: Progressing Towards Goal  Goal: Psychosocial  Outcome: Progressing Towards Goal     Problem: Sepsis: Day 6  Goal: Off Pathway (Use only if patient is Off Pathway)  Outcome: Progressing Towards Goal  Goal: *Oxygen saturation within defined limits  Outcome: Progressing Towards Goal  Goal: *Vital signs within defined limits  Outcome: Progressing Towards Goal  Goal: *Tolerating diet  Outcome: Progressing Towards Goal  Goal: *Demonstrates progressive activity  Outcome: Progressing Towards Goal  Goal: Influenza immunization  Outcome: Progressing Towards Goal  Goal: *Pneumococcal immunization  Outcome: Progressing Towards Goal  Goal: Activity/Safety  Outcome: Progressing Towards Goal  Goal: Diagnostic Test/Procedures  Outcome: Progressing Towards Goal  Goal: Nutrition/Diet  Outcome: Progressing Towards Goal  Goal: Discharge Planning  Outcome: Progressing Towards Goal  Goal: Medications  Outcome: Progressing Towards Goal  Goal: Respiratory  Outcome: Progressing Towards Goal  Goal: Treatments/Interventions/Procedures  Outcome: Progressing Towards Goal  Goal: Psychosocial  Outcome: Progressing Towards Goal     Problem: Sepsis: Discharge Outcomes  Goal: *Vital signs within defined limits  Outcome: Progressing Towards Goal  Goal: *Tolerating diet  Outcome: Progressing Towards Goal  Goal: *Verbalizes understanding and describes prescribed diet  Outcome: Progressing Towards Goal  Goal: *Demonstrates progressive activity  Outcome: Progressing Towards Goal  Goal: *Describes follow-up/return visits to physicians  Outcome: Progressing Towards Goal  Goal: *Verbalizes name, dosage, time, side effects, and number of days to continue medications  Outcome: Progressing Towards Goal  Goal: *Influenza immunization (Oct-Mar only)  Outcome: Progressing Towards Goal  Goal: *Pneumococcal immunization  Outcome: Progressing Towards Goal  Goal: *Lungs clear or at baseline  Outcome: Progressing Towards Goal  Goal: *Oxygen saturation returns to baseline or 90% or better on room air  Outcome: Progressing Towards Goal  Goal: *Glycemic control  Outcome: Progressing Towards Goal  Goal: *Absence of deep venous thrombosis signs and symptoms(Stroke Metric)  Outcome: Progressing Towards Goal  Goal: *Describes available resources and support systems  Outcome: Progressing Towards Goal  Goal: *Optimal pain control at patient's stated goal  Outcome: Progressing Towards Goal

## 2019-10-30 NOTE — CONSULTS
Requesting Provider: Daphne Mejía MD - Reason for Consultation: \"severe right hydronephrosis\"  Pre-existing [facility] Patient:   No                Patient: Katharine Stark MRN: [id number]  SSN: [id number]    YOB: 1980  Age: 41 y.o. Sex: male     Location: [address] [place] [address]       Code Status: Full Code   PCP: Surekha Ceja, CHARLY  - [phone]   Emergency Contact:  Primary Emergency Contact: Carlos Matthew, Home Phone: [phone]   Race/Faith/Language: WHITE OR Kenith Higgins / Messi Katz / Susana Monika: Payor: Yuliana Novoa / Plan: Geraldine Boeck / Product Type: Self Pay /    Prior Admission Data: [facility] EMERGENCY DEPT     Hospitalized:  Hospital Day: 2 - Admitted 10/29/2019 11:12 AM     CONSULTANTS  IP CONSULT TO [address] [place] [address]    ICD-10-CM ICD-9-CM   1. Urinary retention R33.9 788.20   2. Acute kidney injury ([facility] [address] N17.9 584.9         Assessment/Plan:       · Severe right hydronephrosis    -Leave hansen catheter in place. Repeat renal U/S. Will need OV/work-up to determine initial cause. CC: Vomiting; Rectal Bleeding; and High Blood Sugar   HPI: He is a 41 y.o. male w/ PMHx of diabetes, came to the ER w/ Cc of N/V, right-sided abdominal pain beginning 3-4 days ago. He had a CT abdomen in the emergency department which showed evidence of severe right-sided hydronephrosis with massive distention of the bladder, reason for urology consult. No stones seen. Hansen has since then been placed. Creat now improved @ 1.11. Hansen w/ good UOP. Problem: severe right hydronephrosis;  Location: right kidney; Severity: severe; Timing:constant, Context: as above; Better/Worse: hansen, Associated s/s:right abdominal pain, N/V     Temp (24hrs), Av.1 °F (36.7 °C), Min:97.4 °F (36.3 °C), Max:98.7 °F (37.1 °C)    Urinary Status: Hansen  Creatinine   Date/Time Value Ref Range Status   10/30/2019 03:01 AM 1.11 0.70 - 1.30 MG/DL Final     Comment:     INVESTIGATED PER DELTA CHECK PROTOCOL   10/29/2019 12:16 PM 2.23 (H) 0.70 - 1.30 MG/DL Final   08/05/2019 11:43 AM 0.52 (L) 0.70 - 1.30 MG/DL Final   07/07/2019 06:30 AM 0.78 0.70 - 1.30 MG/DL Final   07/07/2019 02:19 AM 0.89 0.70 - 1.30 MG/DL Final     Current Antimicrobial Therapy (168h ago, onward)    Ordered     Start Stop    10/29/19 1854  cefTRIAXone (ROCEPHIN) 1 g in 0.9% sodium chloride (MBP/ADV) 50 mL  1 g,   IntraVENous,   EVERY 24 HOURS      10/29/19 1800 11/03/19 1857        Key Anti-Platelet Anticoagulant Meds     The patient is on no antiplatelet meds or anticoagulants.         Diet: DIET DIABETIC WITH OPTIONS Consistent Carb 1800kcal; Regular -       Labs     Lab Results   Component Value Date/Time    Lactic acid 0.7 10/29/2019 09:07 PM    Lactic acid 2.3 (HH) 04/14/2019 01:10 PM    Lactic acid 1.1 11/18/2018 08:34 PM    WBC 15.2 (H) 10/30/2019 03:01 AM    HCT 36.6 10/30/2019 03:01 AM    PLATELET 071 74/63/3437 03:01 AM    Sodium 139 10/30/2019 03:01 AM    Potassium 3.0 (L) 10/30/2019 03:01 AM    Chloride 101 10/30/2019 03:01 AM    CO2 31 10/30/2019 03:01 AM    BUN 36 (H) 10/30/2019 03:01 AM    Creatinine 1.11 10/30/2019 03:01 AM    Glucose 96 10/30/2019 03:01 AM    Calcium 8.4 (L) 10/30/2019 03:01 AM    Magnesium 1.8 07/07/2019 06:30 AM    INR 1.0 04/16/2019 02:03 AM     UA:   Lab Results   Component Value Date/Time    Color YELLOW/STRAW 10/29/2019 12:37 PM    Appearance CLEAR 10/29/2019 12:37 PM    Specific gravity 1.027 10/29/2019 12:37 PM    Specific gravity 1.020 07/06/2019 10:02 PM    pH (UA) 5.5 10/29/2019 12:37 PM    Protein 100 (A) 10/29/2019 12:37 PM    Glucose >1,000 (A) 10/29/2019 12:37 PM    Ketone 80 (A) 10/29/2019 12:37 PM    Bilirubin NEGATIVE  10/29/2019 12:37 PM    Urobilinogen 0.2 10/29/2019 12:37 PM    Nitrites NEGATIVE  10/29/2019 12:37 PM    Leukocyte Esterase NEGATIVE  10/29/2019 12:37 PM    Epithelial cells FEW 10/29/2019 12:37 PM    Bacteria NEGATIVE  10/29/2019 12:37 PM    WBC 0-4 10/29/2019 12:37 PM RBC 0-5 10/29/2019 12:37 PM     Imaging     Results for orders placed during the hospital encounter of 10/29/19   CT ABD PELV WO CONT    Narrative EXAM: CT ABD PELV WO CONT    INDICATION: Abdominal pain    COMPARISON: November 20, 2018    CONTRAST:  None. TECHNIQUE:   Thin axial images were obtained through the abdomen and pelvis. Coronal and  sagittal reconstructions were generated. Oral contrast was not administered. CT  dose reduction was achieved through use of a standardized protocol tailored for  this examination and automatic exposure control for dose modulation. The absence of intravenous contrast material reduces the sensitivity for  evaluation of the solid parenchymal organs of the abdomen. FINDINGS:   LUNG BASES: Clear. INCIDENTALLY IMAGED HEART AND MEDIASTINUM: Marked thickening of the distal  esophagus  LIVER: No mass or biliary dilatation. GALLBLADDER: Unremarkable. SPLEEN: No mass. PANCREAS: No mass or ductal dilatation. ADRENALS: Unremarkable. KIDNEYS/URETERS: Severe right-sided hydronephrosis. Right ureter is dilated as  well. No obstructing stone. No left-sided hydronephrosis  STOMACH: Unremarkable. SMALL BOWEL: No dilatation or wall thickening. COLON: No dilatation or wall thickening. APPENDIX: Not identified  PERITONEUM: No ascites or pneumoperitoneum. RETROPERITONEUM: No lymphadenopathy or aortic aneurysm. REPRODUCTIVE ORGANS: Not enlarged  URINARY BLADDER: Massively distended. No wall thickening or stone  BONES: No destructive bone lesion. ADDITIONAL COMMENTS: N/A      Impression IMPRESSION:    1. Massive distention of the bladder. Severe right-sided hydronephrosis which  may be due to reflux. Recommend post void ultrasound to confirm that the  right-sided hydronephrosis resolves. No left-sided hydronephrosis  2. Marked thickening of the distal esophagus which can be seen with esophagitis.        US Results (most recent):  Results from [place] encounter on 08/05/19    ABD LTD    Narrative ULTRASOUND OF THE RIGHT UPPER QUADRANT    INDICATION: Elevated liver function tests. COMPARISON: 7/7/2019, CT abdomen pelvis 11/20/2018. TECHNIQUE:  Sonography of the right upper quadrant was performed. FINDINGS:    LIVER: Normal echogenicity. No focal hepatic mass or intrahepatic biliary  dilatation is shown. GALLBLADDER: No gallstones, wall thickening, or pericholecystic fluid. Minimal  sludge layers dependently. COMMON DUCT: 0.3 cm in diameter. The duct is normal caliber. PANCREAS: The visualized portions of the pancreas are normal.   RIGHT KIDNEY: 14.4 cm in length. No hydronephrosis, shadowing calculus, or  contour-deforming renal mass. Impression IMPRESSION:   Normal right upper quadrant ultrasound.        Cultures     All Micro Results     Procedure Component Value Units Date/Time    CULTURE, BLOOD, PAIRED [[id number]] Collected:  10/30/19 0301    Order Status:  Completed Specimen:  Blood Updated:  10/30/19 0712    INFLUENZA A & B AG (RAPID TEST) [[id number]] Collected:  10/29/19 1216    Order Status:  Completed Specimen:  Nasopharyngeal from Nasal washing Updated:  10/29/19 1235     Influenza A Antigen NEGATIVE         Influenza B Antigen NEGATIVE               Past History: (Complete 2+/3 areas)   No Known Allergies   Current Facility-Administered Medications   Medication Dose Route Frequency    sodium chloride (NS) flush 5-40 mL  5-40 mL IntraVENous Q8H    sodium chloride (NS) flush 5-40 mL  5-40 mL IntraVENous PRN    ondansetron (ZOFRAN) injection 4 mg  4 mg IntraVENous Q6H PRN    docusate sodium (COLACE) capsule 100 mg  100 mg Oral DAILY PRN    0.9% sodium chloride infusion  100 mL/hr IntraVENous CONTINUOUS    HYDROcodone-acetaminophen (NORCO) 5-325 mg per tablet 1 Tab  1 Tab Oral Q4H PRN    cefTRIAXone (ROCEPHIN) 1 g in 0.9% sodium chloride (MBP/ADV) 50 mL  1 g IntraVENous Q24H    insulin lispro (HUMALOG) injection   SubCUTAneous Q6H    glucose chewable tablet 16 g  4 Tab Oral PRN    dextrose (D50W) injection syrg 12.5-25 g  12.5-25 g IntraVENous PRN    glucagon (GLUCAGEN) injection 1 mg  1 mg IntraMUSCular PRN    pantoprazole (PROTONIX) 40 mg in sodium chloride 0.9% 10 mL injection  40 mg IntraVENous Q24H    dextrose 5% infusion  25 mL/hr IntraVENous CONTINUOUS    Prior to Admission medications    Medication Sig Start Date End Date Taking? Authorizing Provider   insulin glargine (LANTUS U-100 INSULIN) 100 unit/mL injection 42 Units by SubCUTAneous route daily. Yes Provider, Historical   insulin regular (NOVOLIN R) 100 unit/mL injection 2-10 Units by SubCUTAneous route Before breakfast, lunch, and dinner. Blood Glucose (mg/dL)       Insulin Dose (units)              150-200                               2               201-250                               4               251-300                               6               301-350                               8               >351                                   10   Yes Provider, Historical        PMHx:  has a past medical history of Bipolar 1 disorder, depressed ([facility] [address] Bipolar disorder ([facility] [address] Depression, Diabetes ([facility] [address] DKA, type 1 ([facility] [address] (1/27/2013), H/O noncompliance with medical treatment, presenting hazards to health, MRSA (methicillin resistant staph aureus) culture positive, MRSA (methicillin resistant Staphylococcus aureus), Noncompliance with medication regimen, Second hand smoke exposure, Seizure ([facility] [address] and Seizures ([facility] [address] (2006 or 2007). PSurgHx:  has a past surgical history that includes hx orthopaedic (Left); hx heent; and upper gi endoscopy,biopsy (11/20/2018). PSocHx:  reports that he has been smoking cigarettes. He has a 1.60 pack-year smoking history. He has never used smokeless tobacco. He reports that he does not drink alcohol or use drugs.    ROS:  (Complete - 10 systems) - DENIES: Weightloss (Constitutional), Dry mouth (ENMT), Chest pain (CV), SOB (Respiratory), Constipation (GI), Weakness (MS), Pallor (Skin), TIA Sx (Neuro), Confusion (Psych), Easy bruising (Heme)    Physical Exam: (Comprehesive - 8+ 1995 Systems)     (1) Constitutional:  FIO2:   on SpO2: O2 Sat (%): 97 %  O2 Device: Room air    Patient Vitals for the past 24 hrs:   BP Temp Pulse Resp SpO2   10/30/19 1150 132/72 98.1 °F (36.7 °C) 93 18 97 %   10/30/19 0644 115/64 98.7 °F (37.1 °C) 87 18 98 %   10/30/19 0332 131/75 98.1 °F (36.7 °C) 88 15 97 %   10/29/19 2213 (!) 156/98 97.4 °F (36.3 °C) 100 18 100 %   10/29/19 2045 112/81 -- 89 -- 98 %   10/29/19 2030 117/79 -- 89 -- 98 %   10/29/19 2015 123/87 -- 90 -- 99 %   10/29/19 2000 (!) 149/93 -- 93 -- 100 %   10/29/19 1945 (!) 157/91 -- 92 -- 99 %   10/29/19 1930 (!) 150/97 -- 93 -- 99 %   10/29/19 1915 137/90 -- 100 -- 100 %   10/29/19 1900 117/79 -- 89 -- 95 %   10/29/19 1845 139/86 -- 95 -- 92 %   10/29/19 1815 128/75 -- 91 -- 97 %   10/29/19 1715 (!) 151/95 -- (!) 102 -- 99 %   10/29/19 1630 (!) 141/92 -- (!) 104 -- 100 %   10/29/19 1600 (!) 133/98 -- 98 -- 95 %   10/29/19 1515 135/86 -- (!) 108 -- 98 %       Date 10/29/19 0700 - 10/30/19 0659 10/30/19 0700 - 10/31/19 0659   Shift 4269-2934 9843-6352 24 Hour Total 0947-9564 9499-4364 24 Hour Total   INTAKE   Shift Total(mL/kg)         OUTPUT   Urine(mL/kg/hr) 1000(1.4) 1900(2.7) 2900(2)        Urine Output (mL) (Urinary Catheter 10/29/19 2- way) 1000 1900 2900      Shift Total(mL/kg) 1000(17) 1900(32.2) 2900(49.2)      NET -1000 -1900 -2900      Weight (kg) 59 59 59 59 59 59      (2) ENMT:   moist mucous membranes, normal sinuses   (3) Respiratory:  breathing easily, no distress   (4) GI:  no abdominal masses, tenderness   (5) :   Beard in place, good UOP   (6) Lymphatic:  no adenopathy, neck supple   (7) Muscloskeletal:  no gross deformity, normal ROM   (8) Skin:  no rash, warm & dry   (9) Neuro:  no focal deficits, normal speech      Signed By: Laila Zepeda NP  - October 30, 2019     Seen and agree  Leave hansen  flomax  Will need to go home with hansen and follow up as outpatient

## 2019-10-30 NOTE — PROGRESS NOTES
Hospitalist Progress Note    NAME: Geoffrey Wilson   :  1980   MRN:  [id number]       Assessment / Plan:    Severe right-sided hydronephrosis  - With urinary retention  - CT also revealed massive distention of the bladder  - Urology was called in the ER  - Urology input is appreciated  -Continue Beard catheter    Sepsis  -Secondary to complicated UTI with hydronephrosis  - Q sofa score of 0  - Follow blood cultures  -Continue ceftriaxone for now  -Continue IV fluids    History diabetes mellitus type 2  - He was hypoglycemic on admission  -continue correction scale and hypoglycemia protocol    Acute kidney injury  - Secondary to obstructive uropathy from urinary retention  - Improving, creatinine 1.11 from 2.23 on admission  -Continue IV fluids, Beard catheter  -Follow creatinine daily    History of bipolar disorder, seizure, depression  -Pharmacy was consulted for medication reconciliation, input is appreciated    Incidental esophagitis on CT abdomen  - Patient asymptomatic right now  -Continue PPI  -Need follow-up as an outpatient    18.5 - 24.9 Normal weight / Body mass index is 21.63 kg/m². Code status: Full  Prophylaxis: Hep SQ  Recommended Disposition: Home w/Family     Subjective:     Patient was seen and examined this morning. No acute events since admission. He reported that he feels better. Review of Systems:  Symptom Y/N Comments  Symptom Y/N Comments   Fever/Chills n   Chest Pain n    Poor Appetite n   Edema n    Cough n   Abdominal Pain n    Sputum n   Joint Pain     SOB/JOHNSTON n   Pruritis/Rash     Nausea/vomit n   Tolerating PT/OT y    Diarrhea n   Tolerating Diet y    Constipation n   Other         Objective:     VITALS:   Last 24hrs VS reviewed since prior progress note.  Most recent are:  Patient Vitals for the past 24 hrs:   Temp Pulse Resp BP SpO2   10/30/19 1150 98.1 °F (36.7 °C) 93 18 132/72 97 %   10/30/19 0644 98.7 °F (37.1 °C) 87 18 115/64 98 %   10/30/19 0332 98.1 °F (36.7 °C) 88 15 131/75 97 %   10/29/19 2213 97.4 °F (36.3 °C) 100 18 (!) 156/98 100 %   10/29/19 2045 -- 89 -- 112/81 98 %   10/29/19 2030 -- 89 -- 117/79 98 %   10/29/19 2015 -- 90 -- 123/87 99 %   10/29/19 2000 -- 93 -- (!) 149/93 100 %   10/29/19 1945 -- 92 -- (!) 157/91 99 %   10/29/19 1930 -- 93 -- (!) 150/97 99 %   10/29/19 1915 -- 100 -- 137/90 100 %   10/29/19 1900 -- 89 -- 117/79 95 %   10/29/19 1845 -- 95 -- 139/86 92 %   10/29/19 1815 -- 91 -- 128/75 97 %   10/29/19 1715 -- (!) 102 -- (!) 151/95 99 %   10/29/19 1630 -- (!) 104 -- (!) 141/92 100 %   10/29/19 1600 -- 98 -- (!) 133/98 95 %   10/29/19 1515 -- (!) 108 -- 135/86 98 %       Intake/Output Summary (Last 24 hours) at 10/30/2019 1324  Last data filed at 10/30/2019 1259  Gross per 24 hour   Intake 360 ml   Output 3550 ml   Net -3190 ml        PHYSICAL EXAM:  General: WD, WN. Alert, cooperative, no acute distress    EENT:  EOMI. Anicteric sclerae. MMM  Resp:  CTA bilaterally, no wheezing or rales. No accessory muscle use  CV:  Regular  rhythm,  No edema  GI:  Soft, Non distended, Non tender.  +Bowel sounds  Neurologic:  Alert and oriented X 3, normal speech,   Psych:   Good insight. Not anxious nor agitated  Skin:  No rashes. No jaundice    Reviewed most current lab test results and cultures  YES  Reviewed most current radiology test results   YES  Review and summation of old records today    NO  Reviewed patient's current orders and MAR    YES  PMH/SH reviewed - no change compared to H&P  ________________________________________________________________________  Care Plan discussed with:    Comments   Patient x    Family      RN x    Care Manager x    Consultant                       x Multidiciplinary team rounds were held today with , nursing, pharmacist and clinical coordinator. Patient's plan of care was discussed; medications were reviewed and discharge planning was addressed. ________________________________________________________________________  Total NON critical care TIME:  40   Minutes    Total CRITICAL CARE TIME Spent:   Minutes non procedure based      Comments   >50% of visit spent in counseling and coordination of care     ________________________________________________________________________  Marcos Richard MD     Procedures: see electronic medical records for all procedures/Xrays and details which were not copied into this note but were reviewed prior to creation of Plan. LABS:  I reviewed today's most current labs and imaging studies.   Pertinent labs include:  Recent Labs     10/30/19  0301 10/29/19  1216   WBC 15.2* 18.2*   HGB 12.7 17.1*   HCT 36.6 47.6    480*     Recent Labs     10/30/19  0301 10/29/19  1216    139   K 3.0* 3.5    94*   CO2 31 31   GLU 96 77   BUN 36* 47*   CREA 1.11 2.23*   CA 8.4* 10.1   ALB  --  3.5   TBILI  --  0.7   SGOT  --  18   ALT  --  68       Signed: Marcos Richard MD

## 2019-10-30 NOTE — PROGRESS NOTES
TRANSFER - IN REPORT:    Verbal report received from nestor(name) on Zeenat Share  being received from ed(unit) for routine progression of care      Report consisted of patients Situation, Background, Assessment and   Recommendations(SBAR). Information from the following report(s) SBAR and Kardex was reviewed with the receiving nurse. Opportunity for questions and clarification was provided. Assessment completed upon patients arrival to unit and care assumed.

## 2019-10-30 NOTE — PROGRESS NOTES
Problem: Falls - Risk of  Goal: *Absence of Falls  Description  Document James Jiang Fall Risk and appropriate interventions in the flowsheet.   Outcome: Progressing Towards Goal  Note:   Fall Risk Interventions:            Medication Interventions: Bed/chair exit alarm    Elimination Interventions: Bed/chair exit alarm

## 2019-10-30 NOTE — PROGRESS NOTES
Spoke with Dr Nicolas Amanda regarding virtual rn calling this nurse about sepsis bundle.  Dr Nicolas Amanda with inform Dr Christos Chester and to see if any orders are to be followed

## 2019-10-30 NOTE — PROGRESS NOTES
Spoke with Dr. Racquel Oviedo notified patient c/o 8/10 ABD per MD ok to give [place] as ordered. Also notified MD of urology recommendations per MD patient may begin a low carb diet.

## 2019-10-30 NOTE — PROGRESS NOTES
Bedside and Verbal shift change report given to [address] (oncoming nurse) by Kamron Souza RN (offgoing nurse). Report included the following information SBAR, Kardex, Recent Results and Med Rec Status.     Zone Phone:   [phone]        Significant changes during shift:  pt. Reports emisis x2 clear, zofran administered x2, medicated for 8/10 ABD pain x1.  Diet advanced to low carb diabetic diet.            Patient Information     Basia Ceja  41 y.o.  10/29/2019 11:12 AM by Roney Carter MD. Basia Ceja was admitted from Home     Problem List           Patient Active Problem List     Diagnosis Date Noted    Hydronephrosis of right kidney 10/29/2019    DKA (diabetic ketoacidoses) ([address] 07/06/2019    Nausea with vomiting 04/16/2019    DKA, type 1, not at goal [facility]) 04/14/2019    DKA, type 1 ([address] 11/18/2018    Neuropathy 11/18/2018    Upper GI bleed 11/18/2018    Tachycardia 02/02/2014    Diabetic neuropathy, type I diabetes mellitus ([address] 02/02/2014    Tobacco abuse 08/18/2011       Class: Chronic    HTN (hypertension) 03/13/2011           Past Medical History:   Diagnosis Date    Bipolar 1 disorder, depressed ([address]      Bipolar disorder ([address]      Depression      Diabetes ([address]      DKA, type 1 ([address] 1/27/2013     diagnosed age 23    H/O noncompliance with medical treatment, presenting hazards to health      MRSA (methicillin resistant staph aureus) culture positive      MRSA (methicillin resistant Staphylococcus aureus)       Face    Noncompliance with medication regimen      Second hand smoke exposure      Seizure ([address]      Seizures ([address] 2006 or 2007     one episode during [facility]            Core Measures:     CVA: No No  CHF:No No  PNA:No No           Activity Status:     OOB to Chair No  Ambulated this shift No   Bed Rest yes     Supplemental O2: (If Applicable)     NC No  NRB No  Venti-mask No  On  Liters/min        LINES AND DRAINS:  PIV: 20 right forearm  PIV 18 left AC          DVT prophylaxis:     DVT prophylaxis Med- No  DVT prophylaxis SCD or KEVIN- No      Wounds: (If Applicable)     Wounds- No     Location      Patient Safety:     Falls Score Total Score: 2  Safety Level_______  Bed Alarm On? No  Sitter?  No     Plan for upcoming shift: continue to monitor            Discharge Plan: No      Active Consults:  IP CONSULT TO UROLOGY

## 2019-10-31 ENCOUNTER — APPOINTMENT (OUTPATIENT)
Dept: ULTRASOUND IMAGING | Age: 39
DRG: 872 | End: 2019-10-31
Attending: UROLOGY
Payer: SELF-PAY

## 2019-10-31 LAB
GLUCOSE BLD STRIP.AUTO-MCNC: 156 MG/DL (ref 65–100)
GLUCOSE BLD STRIP.AUTO-MCNC: 173 MG/DL (ref 65–100)
GLUCOSE BLD STRIP.AUTO-MCNC: 200 MG/DL (ref 65–100)
GLUCOSE BLD STRIP.AUTO-MCNC: 208 MG/DL (ref 65–100)
GLUCOSE BLD STRIP.AUTO-MCNC: 209 MG/DL (ref 65–100)
SERVICE CMNT-IMP: ABNORMAL

## 2019-10-31 PROCEDURE — 74011636637 HC RX REV CODE- 636/637: Performed by: INTERNAL MEDICINE

## 2019-10-31 PROCEDURE — 74011000250 HC RX REV CODE- 250: Performed by: INTERNAL MEDICINE

## 2019-10-31 PROCEDURE — C9113 INJ PANTOPRAZOLE SODIUM, VIA: HCPCS | Performed by: INTERNAL MEDICINE

## 2019-10-31 PROCEDURE — 65660000000 HC RM CCU STEPDOWN

## 2019-10-31 PROCEDURE — 74011250637 HC RX REV CODE- 250/637: Performed by: INTERNAL MEDICINE

## 2019-10-31 PROCEDURE — 74011250637 HC RX REV CODE- 250/637: Performed by: UROLOGY

## 2019-10-31 PROCEDURE — 76770 US EXAM ABDO BACK WALL COMP: CPT

## 2019-10-31 PROCEDURE — 74011000258 HC RX REV CODE- 258: Performed by: INTERNAL MEDICINE

## 2019-10-31 PROCEDURE — 74011250636 HC RX REV CODE- 250/636: Performed by: INTERNAL MEDICINE

## 2019-10-31 PROCEDURE — 82962 GLUCOSE BLOOD TEST: CPT

## 2019-10-31 PROCEDURE — 94760 N-INVAS EAR/PLS OXIMETRY 1: CPT

## 2019-10-31 RX ORDER — NYSTATIN 100000 [USP'U]/ML
500000 SUSPENSION ORAL 4 TIMES DAILY
Status: DISCONTINUED | OUTPATIENT
Start: 2019-10-31 | End: 2019-11-01 | Stop reason: HOSPADM

## 2019-10-31 RX ORDER — TAMSULOSIN HYDROCHLORIDE 0.4 MG/1
0.4 CAPSULE ORAL DAILY
Status: DISCONTINUED | OUTPATIENT
Start: 2019-10-31 | End: 2019-11-01 | Stop reason: HOSPADM

## 2019-10-31 RX ORDER — POTASSIUM CHLORIDE 750 MG/1
40 TABLET, FILM COATED, EXTENDED RELEASE ORAL
Status: COMPLETED | OUTPATIENT
Start: 2019-10-31 | End: 2019-10-31

## 2019-10-31 RX ORDER — INSULIN GLARGINE 100 [IU]/ML
10 INJECTION, SOLUTION SUBCUTANEOUS
Status: DISCONTINUED | OUTPATIENT
Start: 2019-10-31 | End: 2019-11-01 | Stop reason: HOSPADM

## 2019-10-31 RX ADMIN — Medication 10 ML: at 06:30

## 2019-10-31 RX ADMIN — HYDROCODONE BITARTRATE AND ACETAMINOPHEN 1 TABLET: 5; 325 TABLET ORAL at 04:53

## 2019-10-31 RX ADMIN — INSULIN LISPRO 2 UNITS: 100 INJECTION, SOLUTION INTRAVENOUS; SUBCUTANEOUS at 08:43

## 2019-10-31 RX ADMIN — NYSTATIN 500000 UNITS: 100000 SUSPENSION ORAL at 15:49

## 2019-10-31 RX ADMIN — HYDROCODONE BITARTRATE AND ACETAMINOPHEN 1 TABLET: 5; 325 TABLET ORAL at 21:26

## 2019-10-31 RX ADMIN — HYDROCODONE BITARTRATE AND ACETAMINOPHEN 1 TABLET: 5; 325 TABLET ORAL at 11:26

## 2019-10-31 RX ADMIN — Medication 10 ML: at 15:49

## 2019-10-31 RX ADMIN — Medication 10 ML: at 21:17

## 2019-10-31 RX ADMIN — CEFTRIAXONE 1 G: 1 INJECTION, POWDER, FOR SOLUTION INTRAMUSCULAR; INTRAVENOUS at 20:00

## 2019-10-31 RX ADMIN — TAMSULOSIN HYDROCHLORIDE 0.4 MG: 0.4 CAPSULE ORAL at 15:49

## 2019-10-31 RX ADMIN — Medication 10 ML: at 07:12

## 2019-10-31 RX ADMIN — PANTOPRAZOLE SODIUM 40 MG: 40 INJECTION, POWDER, FOR SOLUTION INTRAVENOUS at 20:00

## 2019-10-31 RX ADMIN — ONDANSETRON 4 MG: 2 INJECTION INTRAMUSCULAR; INTRAVENOUS at 11:26

## 2019-10-31 RX ADMIN — INSULIN LISPRO 2 UNITS: 100 INJECTION, SOLUTION INTRAVENOUS; SUBCUTANEOUS at 11:51

## 2019-10-31 RX ADMIN — INSULIN GLARGINE 10 UNITS: 100 INJECTION, SOLUTION SUBCUTANEOUS at 11:51

## 2019-10-31 RX ADMIN — NYSTATIN 500000 UNITS: 100000 SUSPENSION ORAL at 21:16

## 2019-10-31 RX ADMIN — ONDANSETRON 4 MG: 2 INJECTION INTRAMUSCULAR; INTRAVENOUS at 05:01

## 2019-10-31 RX ADMIN — HYDROCODONE BITARTRATE AND ACETAMINOPHEN 1 TABLET: 5; 325 TABLET ORAL at 15:55

## 2019-10-31 RX ADMIN — POTASSIUM CHLORIDE 40 MEQ: 750 TABLET, FILM COATED, EXTENDED RELEASE ORAL at 11:51

## 2019-10-31 NOTE — PROGRESS NOTES
Problem: Falls - Risk of  Goal: *Absence of Falls  Description  Document Lizette Patches Fall Risk and appropriate interventions in the flowsheet. Outcome: Progressing Towards Goal  Note:   Fall Risk Interventions:  Mobility Interventions: PT Consult for mobility concerns         Medication Interventions: Bed/chair exit alarm    Elimination Interventions: Call light in reach              Problem: Patient Education: Go to Patient Education Activity  Goal: Patient/Family Education  Outcome: Progressing Towards Goal     Problem: Pressure Injury - Risk of  Goal: *Prevention of pressure injury  Description  Document Corey Scale and appropriate interventions in the flowsheet.   Outcome: Progressing Towards Goal  Note:   Pressure Injury Interventions:  Sensory Interventions: Minimize linen layers    Moisture Interventions: Maintain skin hydration (lotion/cream)    Activity Interventions: Increase time out of bed    Mobility Interventions: HOB 30 degrees or less    Nutrition Interventions: Document food/fluid/supplement intake    Friction and Shear Interventions: HOB 30 degrees or less                Problem: Patient Education: Go to Patient Education Activity  Goal: Patient/Family Education  Outcome: Progressing Towards Goal     Problem: Patient Education: Go to Patient Education Activity  Goal: Patient/Family Education  Outcome: Progressing Towards Goal     Problem: Sepsis: Day of Diagnosis  Goal: Off Pathway (Use only if patient is Off Pathway)  Outcome: Progressing Towards Goal  Goal: *Fluid resuscitation  Outcome: Progressing Towards Goal  Goal: *Paired blood cultures prior to first dose of antibiotic  Outcome: Progressing Towards Goal  Goal: *First dose of  appropriate antibiotic within 3 hours of arrival to ED, within 1 hour of arrival to ICU  Outcome: Progressing Towards Goal  Goal: *Lactic acid with first set of blood cultures  Outcome: Progressing Towards Goal  Goal: *Pneumococcal immunization (if eligible)  Outcome: Progressing Towards Goal  Goal: *Influenza immunization (if eligible)  Outcome: Progressing Towards Goal  Goal: Activity/Safety  Outcome: Progressing Towards Goal  Goal: Consults, if ordered  Outcome: Progressing Towards Goal  Goal: Diagnostic Test/Procedures  Outcome: Progressing Towards Goal  Goal: Nutrition/Diet  Outcome: Progressing Towards Goal  Goal: Discharge Planning  Outcome: Progressing Towards Goal  Goal: Medications  Outcome: Progressing Towards Goal  Goal: Respiratory  Outcome: Progressing Towards Goal  Goal: Treatments/Interventions/Procedures  Outcome: Progressing Towards Goal  Goal: Psychosocial  Outcome: Progressing Towards Goal

## 2019-10-31 NOTE — PROGRESS NOTES
Bedside and Verbal shift change report given to Sigifredo Dawkins (oncoming nurse) by Johnathon Manriquez (offgoing nurse).  Report included the following information SBAR, Kardex, Recent Results and Med Rec Status.     Zone Phone:   [phone]        Significant changes during shift: Pt did not eat, drink just a little, continue with nausea, started on protonix         Patient Information     Magdy Harmon  41 y.o.  10/29/2019 11:12 AM by Eric Luna MD. Magdy Harmon was admitted from Home     Problem List           Patient Active Problem List     Diagnosis Date Noted    Hydronephrosis of right kidney 10/29/2019    DKA (diabetic ketoacidoses) ([address] 07/06/2019    Nausea with vomiting 04/16/2019    DKA, type 1, not at goal [facility]) 04/14/2019    DKA, type 1 ([address] 11/18/2018    Neuropathy 11/18/2018    Upper GI bleed 11/18/2018    Tachycardia 02/02/2014    Diabetic neuropathy, type I diabetes mellitus ([address] 02/02/2014    Tobacco abuse 08/18/2011       Class: Chronic    HTN (hypertension) 03/13/2011           Past Medical History:   Diagnosis Date    Bipolar 1 disorder, depressed ([address]      Bipolar disorder ([address]      Depression      Diabetes ([address]      DKA, type 1 ([address] 1/27/2013     diagnosed age 23    H/O noncompliance with medical treatment, presenting hazards to health      MRSA (methicillin resistant staph aureus) culture positive      MRSA (methicillin resistant Staphylococcus aureus)       Face    Noncompliance with medication regimen      Second hand smoke exposure      Seizure ([address]      Seizures ([address] 2006 or 2007     one episode during nursing home            Core Measures:     CVA: No No  CHF:No No  PNA:No No           Activity Status:     OOB to Chair No  Ambulated this shift No   Bed Rest yes     Supplemental O2: (If Applicable)     NC No  NRB No  Venti-mask No  On  Liters/min        LINES AND DRAINS:  PIV: 20 right forearm  PIV 18 left AC          DVT prophylaxis:     DVT prophylaxis Med- No  DVT prophylaxis SCD or KEVIN- No      Wounds: (If Applicable)     Wounds- No     Location      Patient Safety:     Falls Score Total Score: 2  Safety Level_______  Bed Alarm On? No  Sitter?  No     Plan for upcoming shift: safety, IVF, fs ac and hs, encourage to drink and eat           Discharge Plan: yes TBD     Active Consults:  IP CONSULT TO UROLOGY

## 2019-10-31 NOTE — PROGRESS NOTES
Patient c/o epigastric pain with difficulty swallowing, and continues with nausea MD notified and in to assess.

## 2019-10-31 NOTE — PROGRESS NOTES
Hospitalist Progress Note    NAME: Leonila Langston   :  1980   MRN:  [id number]       Assessment / Plan:    Severe right-sided hydronephrosis  - With urinary retention  - CT also revealed massive distention of the bladder  - Urology input is appreciated  -Continue Beard catheter  -FLOMAX    Sepsis  -HR 94, wbc 15 k ON ADMISSION   -Secondary to complicated UTI with hydronephrosis  - Q sofa score of 0  - Follow blood cultures  -Continue ceftriaxone for now  -Continue IV fluids    History diabetes mellitus type 2  - He was hypoglycemic on admission  -continue correction scale and hypoglycemia protocol    Acute kidney injury  - Secondary to obstructive uropathy from urinary retention  - Improving, creatinine  -Continue IV fluids, Beard catheter  -Follow creatinine daily    History of bipolar disorder, seizure, depression  -Pharmacy was consulted for medication reconciliation, input is appreciated    Incidental esophagitis on CT abdomen  - Patient symptomatic right now  -NYSTATIN   -Continue PPI  -Need follow-up as an outpatient    18.5 - 24.9 Normal weight / Body mass index is 21.63 kg/m². Code status: Full  Prophylaxis: Hep SQ  Recommended Disposition: Home w/Family     Subjective:     Patient was seen and examined this morning. No acute events since admission. He reported that he feels ok and having difficulty swallowing        Review of Systems:  Symptom Y/N Comments  Symptom Y/N Comments   Fever/Chills n   Chest Pain n    Poor Appetite n   Edema n    Cough n   Abdominal Pain n    Sputum n   Joint Pain     SOB/JOHNSTON n   Pruritis/Rash     Nausea/vomit n   Tolerating PT/OT y    Diarrhea n   Tolerating Diet y    Constipation n   Other         Objective:     VITALS:   Last 24hrs VS reviewed since prior progress note.  Most recent are:  Patient Vitals for the past 24 hrs:   Temp Pulse Resp BP SpO2   10/31/19 1124 97.8 °F (36.6 °C) 94 18 (!) 158/92 98 %   10/31/19 0741 97.9 °F (36.6 °C) 84 16 (!) 171/98 99 % 10/31/19 0500 98 °F (36.7 °C) 78 18 (!) 168/92 98 %   10/30/19 2330 97.6 °F (36.4 °C) 79 16 (!) 146/93 96 %   10/30/19 2020 98.3 °F (36.8 °C) 93 16 144/52 97 %   10/30/19 1636 98.4 °F (36.9 °C) 81 18 138/80 98 %       Intake/Output Summary (Last 24 hours) at 10/31/2019 1357  Last data filed at 10/31/2019 1152  Gross per 24 hour   Intake 1722 ml   Output 1400 ml   Net 322 ml        PHYSICAL EXAM:  General: WD, WN. Alert, cooperative, no acute distress    EENT:  EOMI. Anicteric sclerae. MMM  Resp:  CTA bilaterally, no wheezing or rales. No accessory muscle use  CV:  Regular  rhythm,  No edema  GI:  Soft, Non distended, Non tender.  +Bowel sounds  Neurologic:  Alert and oriented X 3, normal speech,   Psych:   Good insight. Not anxious nor agitated  Skin:  No rashes. No jaundice    Reviewed most current lab test results and cultures  YES  Reviewed most current radiology test results   YES  Review and summation of old records today    NO  Reviewed patient's current orders and MAR    YES  PMH/SH reviewed - no change compared to H&P  ________________________________________________________________________  Care Plan discussed with:    Comments   Patient x    Family      RN x    Care Manager x    Consultant  x urology                    x Multidiciplinary team rounds were held today with , nursing, pharmacist and clinical coordinator. Patient's plan of care was discussed; medications were reviewed and discharge planning was addressed.      ________________________________________________________________________  Total NON critical care TIME:  35   Minutes    Total CRITICAL CARE TIME Spent:   Minutes non procedure based      Comments   >50% of visit spent in counseling and coordination of care     ________________________________________________________________________  Virginia Daily MD     Procedures: see electronic medical records for all procedures/Xrays and details which were not copied into this note but were reviewed prior to creation of Plan. LABS:  I reviewed today's most current labs and imaging studies.   Pertinent labs include:  Recent Labs     10/30/19  0301 10/29/19  1216   WBC 15.2* 18.2*   HGB 12.7 17.1*   HCT 36.6 47.6    480*     Recent Labs     10/30/19  0301 10/29/19  1216    139   K 3.0* 3.5    94*   CO2 31 31   GLU 96 77   BUN 36* 47*   CREA 1.11 2.23*   CA 8.4* 10.1   ALB  --  3.5   TBILI  --  0.7   SGOT  --  18   ALT  --  68       Signed: Parish Paulino MD

## 2019-10-31 NOTE — PROGRESS NOTES
Diabetic nurse spoke to me earlier, did not mention about lantus, noted in her notes pt needs lantus 46 units, pt did not eat any food at all for lunch or dinner, blood sugar was 90, continue with D5 NS at 75 cc/hr continue to monitor blood sugars every 6 hours, noted pt blood sugars were very low yesterday I will endorse to incoming nurse about diabetic nurse note

## 2019-10-31 NOTE — PROGRESS NOTES
Bedside and Verbal shift change report given to [address] [place] [address]) by Nemo Castro RN (offgoing nurse). Report included the following information SBAR, Kardex, Recent Results and Med Rec Status.     Zone Phone:   [phone]        Significant changes during shift:  Md assessed for nausea and epigastric pain new order for nystain (not yet administered).  Renal ultrasound completed today, medicated for leg pain and nausea, poor appetite continues.            Patient Information     Darryl Varghese  41 y.o.  10/29/2019 11:12 AM by Juni Lawrence MD. Chapincito Herrera admitted from Home     Problem List               Patient Active Problem List     Diagnosis Date Noted    Hydronephrosis of right kidney 10/29/2019    DKA (diabetic ketoacidoses) ([address] 07/06/2019    Nausea with vomiting 04/16/2019    DKA, type 1, not at goal [facility]) 04/14/2019    DKA, type 1 ([address] 11/18/2018    Neuropathy 11/18/2018    Upper GI bleed 11/18/2018    Tachycardia 02/02/2014    Diabetic neuropathy, type I diabetes mellitus ([address] 02/02/2014    Tobacco abuse 08/18/2011       Class: Chronic    HTN (hypertension) 03/13/2011              Past Medical History:   Diagnosis Date    Bipolar 1 disorder, depressed ([address]      Bipolar disorder ([address]      Depression      Diabetes ([address]      DKA, type 1 ([address] 1/27/2013     diagnosed age 23    H/O noncompliance with medical treatment, presenting hazards to health      MRSA (methicillin resistant staph aureus) culture positive      MRSA (methicillin resistant Staphylococcus aureus)       Face    Noncompliance with medication regimen      Second hand smoke exposure      Seizure ([address]      Seizures ([address] 2006 or 2007     [facility]            Core Measures:     CVA: No No  CHF:No No  PNA:No No           Activity Status:     OOB to Chair No  Ambulated this shift No   Bed Rest yes     Supplemental H1: (UP Applicable)     NC No  NRB No  Venti-mask No  On  Liters/min        LINES AND DRAINS:  PIV: 20 right forearm  PIV 18 left AC           DVT prophylaxis:     DVT prophylaxis Med- No  DVT prophylaxis SCD or KEVIN- No      Wounds: (If Applicable)     Wounds- No     Location      Patient Safety:     Falls Score Total Score: 2  Safety Level_______  Bed Alarm On? No  Sitter? No     Plan for upcoming shift: monitor appetite, 1300 meds need to be administered patient was not on the unit.             Discharge Plan: Home with hansen and Flomax, follow-up appointment with urology.      Active Consults:  IP CONSULT TO UROLOGY

## 2019-10-31 NOTE — PROGRESS NOTES
Bedside and Verbal shift change report given to Sincere Urbano RN (oncoming nurse) by Jolynn Huddleston (offgoing nurse).  Report included the following information SBAR, Kardex, Recent Results and Med Rec Status.     Zone Phone:           Significant changes during shift:       Patient Information     Ebenezer Laureano  41 y.o.  10/29/2019 11:12 AM by Aletha Mccormakc MD. Ebenezer Laureano was admitted from Home     Problem List           Patient Active Problem List     Diagnosis Date Noted    Hydronephrosis of right kidney 10/29/2019    DKA (diabetic ketoacidoses) ([address] 07/06/2019    Nausea with vomiting 04/16/2019    DKA, type 1, not at goal [facility]) 04/14/2019    DKA, type 1 ([address] 11/18/2018    Neuropathy 11/18/2018    Upper GI bleed 11/18/2018    Tachycardia 02/02/2014    Diabetic neuropathy, type I diabetes mellitus ([address] 02/02/2014    Tobacco abuse 08/18/2011       Class: Chronic    HTN (hypertension) 03/13/2011           Past Medical History:   Diagnosis Date    Bipolar 1 disorder, depressed ([address]      Bipolar disorder ([address]      Depression      Diabetes ([address]      DKA, type 1 ([address] 1/27/2013     diagnosed age 23    H/O noncompliance with medical treatment, presenting hazards to health      MRSA (methicillin resistant staph aureus) culture positive      MRSA (methicillin resistant Staphylococcus aureus)       Face    Noncompliance with medication regimen      Second hand smoke exposure      Seizure ([address]      Seizures ([address] 2006 or 2007     one episode during [facility]            Core Measures:     CVA: No No  CHF:No No  PNA:No No           Activity Status:     OOB to Chair No  Ambulated this shift No   Bed Rest yes     Supplemental O2: (If Applicable)     NC No  NRB No  Venti-mask No  On  Liters/min        LINES AND DRAINS:  PIV: 20 right forearm  PIV 18 left AC          DVT prophylaxis:     DVT prophylaxis Med- No  DVT prophylaxis SCD or KEVIN- No      Wounds: (If Applicable)     Wounds- No     Location    Patient Safety:     Falls Score Total Score: 2  Safety Level_______  Bed Alarm On? No  Sitter?  No     Plan for upcoming shift: safety, IVF, fs ac and hs, encourage to drink and eat           Discharge Plan: yes TBD     Active Consults:  IP CONSULT TO UROLOGY

## 2019-11-01 ENCOUNTER — HOME HEALTH ADMISSION (OUTPATIENT)
Dept: HOME HEALTH SERVICES | Facility: HOME HEALTH | Age: 39
End: 2019-11-01

## 2019-11-01 VITALS
SYSTOLIC BLOOD PRESSURE: 147 MMHG | WEIGHT: 130 LBS | BODY MASS INDEX: 21.66 KG/M2 | DIASTOLIC BLOOD PRESSURE: 96 MMHG | OXYGEN SATURATION: 98 % | HEIGHT: 65 IN | TEMPERATURE: 98 F | HEART RATE: 86 BPM | RESPIRATION RATE: 16 BRPM

## 2019-11-01 LAB
ANION GAP SERPL CALC-SCNC: 9 MMOL/L (ref 5–15)
BUN SERPL-MCNC: 15 MG/DL (ref 6–20)
BUN/CREAT SERPL: 26 (ref 12–20)
CALCIUM SERPL-MCNC: 8.9 MG/DL (ref 8.5–10.1)
CHLORIDE SERPL-SCNC: 100 MMOL/L (ref 97–108)
CO2 SERPL-SCNC: 25 MMOL/L (ref 21–32)
COMMENT, HOLDF: NORMAL
CREAT SERPL-MCNC: 0.57 MG/DL (ref 0.7–1.3)
ERYTHROCYTE [DISTWIDTH] IN BLOOD BY AUTOMATED COUNT: 11.9 % (ref 11.5–14.5)
GLUCOSE BLD STRIP.AUTO-MCNC: 169 MG/DL (ref 65–100)
GLUCOSE BLD STRIP.AUTO-MCNC: 238 MG/DL (ref 65–100)
GLUCOSE SERPL-MCNC: 167 MG/DL (ref 65–100)
HCT VFR BLD AUTO: 39.8 % (ref 36.6–50.3)
HGB BLD-MCNC: 13.9 G/DL (ref 12.1–17)
MCH RBC QN AUTO: 31.5 PG (ref 26–34)
MCHC RBC AUTO-ENTMCNC: 34.9 G/DL (ref 30–36.5)
MCV RBC AUTO: 90.2 FL (ref 80–99)
NRBC # BLD: 0 K/UL (ref 0–0.01)
NRBC BLD-RTO: 0 PER 100 WBC
PLATELET # BLD AUTO: 293 K/UL (ref 150–400)
PMV BLD AUTO: 10.2 FL (ref 8.9–12.9)
POTASSIUM SERPL-SCNC: 3.5 MMOL/L (ref 3.5–5.1)
RBC # BLD AUTO: 4.41 M/UL (ref 4.1–5.7)
SAMPLES BEING HELD,HOLD: NORMAL
SERVICE CMNT-IMP: ABNORMAL
SERVICE CMNT-IMP: ABNORMAL
SODIUM SERPL-SCNC: 134 MMOL/L (ref 136–145)
WBC # BLD AUTO: 9.6 K/UL (ref 4.1–11.1)

## 2019-11-01 PROCEDURE — 36415 COLL VENOUS BLD VENIPUNCTURE: CPT

## 2019-11-01 PROCEDURE — 74011250637 HC RX REV CODE- 250/637: Performed by: UROLOGY

## 2019-11-01 PROCEDURE — 94760 N-INVAS EAR/PLS OXIMETRY 1: CPT

## 2019-11-01 PROCEDURE — 74011250636 HC RX REV CODE- 250/636: Performed by: INTERNAL MEDICINE

## 2019-11-01 PROCEDURE — 82962 GLUCOSE BLOOD TEST: CPT

## 2019-11-01 PROCEDURE — 80048 BASIC METABOLIC PNL TOTAL CA: CPT

## 2019-11-01 PROCEDURE — 74011636637 HC RX REV CODE- 636/637: Performed by: INTERNAL MEDICINE

## 2019-11-01 PROCEDURE — 85027 COMPLETE CBC AUTOMATED: CPT

## 2019-11-01 PROCEDURE — 74011250637 HC RX REV CODE- 250/637: Performed by: INTERNAL MEDICINE

## 2019-11-01 RX ORDER — CEPHALEXIN 250 MG/1
500 CAPSULE ORAL EVERY 8 HOURS
Status: DISCONTINUED | OUTPATIENT
Start: 2019-11-01 | End: 2019-11-01 | Stop reason: HOSPADM

## 2019-11-01 RX ORDER — TAMSULOSIN HYDROCHLORIDE 0.4 MG/1
0.4 CAPSULE ORAL DAILY
Qty: 30 CAP | Refills: 0 | Status: SHIPPED | OUTPATIENT
Start: 2019-11-01 | End: 2020-01-02

## 2019-11-01 RX ORDER — CEPHALEXIN 500 MG/1
500 CAPSULE ORAL 3 TIMES DAILY
Qty: 15 CAP | Refills: 0 | Status: SHIPPED | OUTPATIENT
Start: 2019-11-01 | End: 2019-11-04

## 2019-11-01 RX ADMIN — NYSTATIN 500000 UNITS: 100000 SUSPENSION ORAL at 07:55

## 2019-11-01 RX ADMIN — INSULIN LISPRO 1 UNITS: 100 INJECTION, SOLUTION INTRAVENOUS; SUBCUTANEOUS at 00:00

## 2019-11-01 RX ADMIN — Medication 10 ML: at 04:24

## 2019-11-01 RX ADMIN — HYDROCODONE BITARTRATE AND ACETAMINOPHEN 1 TABLET: 5; 325 TABLET ORAL at 09:16

## 2019-11-01 RX ADMIN — HYDROCODONE BITARTRATE AND ACETAMINOPHEN 1 TABLET: 5; 325 TABLET ORAL at 04:39

## 2019-11-01 RX ADMIN — TAMSULOSIN HYDROCHLORIDE 0.4 MG: 0.4 CAPSULE ORAL at 07:55

## 2019-11-01 RX ADMIN — ONDANSETRON 4 MG: 2 INJECTION INTRAMUSCULAR; INTRAVENOUS at 04:39

## 2019-11-01 NOTE — DISCHARGE INSTRUCTIONS
Patient Education        Learning About Hydronephrosis  What is hydronephrosis? Hydronephrosis is swelling of the kidneys. It is caused by a buildup of urine. This condition can happen if a tube that drains urine from your kidneys is blocked. The blockage can come from within the urinary tract or from pressure outside of the tract. Pregnancy is an example of an outside (external) cause. This condition is often caused by a blockage such as a kidney stone, tumor, or blood clot. It also can be caused by a problem in your urinary system that you were born with (congenital problem). What are the symptoms? Some of the common symptoms are:  · Pain in one or both sides. · Stomach pain. · Blood in your urine. Some people have no symptoms. How is it diagnosed? Your doctor will do an ultrasound to look for a blockage in your urinary system. An ultrasound allows your doctor to see a picture of the organs and other structures in your belly (abdomen). You also may need blood and urine tests. How is it treated? Your treatment depends on the cause of the swelling. If it is caused by a blockage, your treatment will depend on the type of blockage you have. If the blockage is caused by a kidney stone, you may wait for the stone to pass. If hydronephrosis happens during pregnancy, it usually clears up on its own. You may need to have urine drained from your bladder or kidneys. A urinary catheter is a small, flexible tube that can be inserted through the urethra and into the bladder, allowing urine to drain. A nephrostomy catheter is a thin tube placed into your kidney to drain urine. Sometimes surgery is needed to clear the blockage. If you have a blockage, you should begin to feel better after the blockage is gone. Many people recover and have no long-term problems. But some may have kidney damage. If hydronephrosis was left untreated for a long time, the damage can be severe.  Severe damage will require further treatment. Follow-up care is a key part of your treatment and safety. Be sure to make and go to all appointments, and call your doctor if you are having problems. It's also a good idea to know your test results and keep a list of the medicines you take. Where can you learn more? Go to http://dwaine-sanjeev.info/. Enter S386 in the search box to learn more about \"Learning About Hydronephrosis. \"  Current as of: October 31, 2018  Content Version: 12.2  © 2655-3469 [employer]. Care instructions adapted under license by [employer] (which disclaims liability or warranty for this information). If you have questions about a medical condition or this instruction, always ask your healthcare professional. [address] any warranty or liability for your use of this information. Patient Education        Acute Kidney Injury: Care Instructions  Your Care Instructions    Acute kidney injury (FELECIA) is a sudden decrease in kidney function. This can happen over a period of hours, days or, in some cases, weeks. FELECIA used to be called acute renal failure, but kidney failure doesn't always happen with FELECIA. Common causes of FELECIA are dehydration, blood loss, and medicines. When FELECIA happens, the kidneys have trouble removing waste and excess fluids from the body. The waste and fluids build up and become harmful. Kidney function may return to normal if the cause of FELECIA is treated quickly. Your chance of a full recovery depends on what caused the problem, how quickly the cause was treated, and what other medical problems you have. A machine may be used to help your kidneys remove waste and fluids for a short period of time. This is called dialysis. Follow-up care is a key part of your treatment and safety. Be sure to make and go to all appointments, and call your doctor if you are having problems.  It's also a good idea to know your test results and keep a list of the medicines you take. How can you care for yourself at home? · Talk to your doctor about how much fluid you should drink. · Eat a balanced diet. Talk to your doctor or a dietitian about what type of diet may be best for you. You may need to limit sodium, potassium, and phosphorus. · If you need dialysis, follow the instructions and schedule for dialysis that your doctor gives you. · Do not smoke. Smoking can make your condition worse. If you need help quitting, talk to your doctor about stop-smoking programs and medicines. These can increase your chances of quitting for good. · Do not drink alcohol. · Review all of your medicines with your doctor. Do not take any medicines, including nonsteroidal anti-inflammatory drugs (NSAIDs) such as ibuprofen (Advil, Motrin) or naproxen (Aleve), unless your doctor says it is safe for you to do so. · Make sure that anyone treating you for any health problem knows that you have had FELECIA. When should you call for help? Call 911 anytime you think you may need emergency care. For example, call if:    · You passed out (lost consciousness).    Call your doctor now or seek immediate medical care if:    · You have new or worse nausea and vomiting.     · You have much less urine than normal, or you have no urine.     · You are feeling confused or cannot think clearly.     · You have new or more blood in your urine.     · You have new swelling.     · You are dizzy or lightheaded, or feel like you may faint.    Watch closely for changes in your health, and be sure to contact your doctor if:    · You do not get better as expected. Where can you learn more? Go to http://dwaine-sanjeev.info/. Enter D352 in the search box to learn more about \"Acute Kidney Injury: Care Instructions. \"  Current as of: October 31, 2018  Content Version: 12.2  © 0744-6912 [employer].  Care instructions adapted under license by [employer] (which disclaims liability or warranty for this information). If you have questions about a medical condition or this instruction, always ask your healthcare professional. [place] [address] any warranty or liability for your use of this information. Smoking Cessation Program:   This is a free, phone/text/email based, smoking cessation program. The program is individualized to meet each patient's needs. To enroll use this link [email or web address] [email or web address]/[email or web address]

## 2019-11-01 NOTE — PROGRESS NOTES
Problem: Falls - Risk of  Goal: *Absence of Falls  Description  Document Renée Rae Fall Risk and appropriate interventions in the flowsheet. Outcome: Progressing Towards Goal  Note:   Fall Risk Interventions:  Mobility Interventions: Bed/chair exit alarm         Medication Interventions: Bed/chair exit alarm    Elimination Interventions: Bed/chair exit alarm              Problem: Pressure Injury - Risk of  Goal: *Prevention of pressure injury  Description  Document Corey Scale and appropriate interventions in the flowsheet.   Outcome: Progressing Towards Goal  Note:   Pressure Injury Interventions:  Sensory Interventions: Float heels    Moisture Interventions: Internal/External urinary devices    Activity Interventions: Increase time out of bed    Mobility Interventions: HOB 30 degrees or less    Nutrition Interventions: Document food/fluid/supplement intake    Friction and Shear Interventions: HOB 30 degrees or less                Problem: Sepsis: Day of Diagnosis  Goal: Off Pathway (Use only if patient is Off Pathway)  Outcome: Progressing Towards Goal  Goal: *Fluid resuscitation  Outcome: Progressing Towards Goal  Goal: *Paired blood cultures prior to first dose of antibiotic  Outcome: Progressing Towards Goal  Goal: *First dose of  appropriate antibiotic within 3 hours of arrival to ED, within 1 hour of arrival to ICU  Outcome: Progressing Towards Goal  Goal: *Lactic acid with first set of blood cultures  Outcome: Progressing Towards Goal  Goal: *Pneumococcal immunization (if eligible)  Outcome: Progressing Towards Goal  Goal: *Influenza immunization (if eligible)  Outcome: Progressing Towards Goal  Goal: Activity/Safety  Outcome: Progressing Towards Goal  Goal: Consults, if ordered  Outcome: Progressing Towards Goal  Goal: Diagnostic Test/Procedures  Outcome: Progressing Towards Goal  Goal: Nutrition/Diet  Outcome: Progressing Towards Goal  Goal: Discharge Planning  Outcome: Progressing Towards Goal  Goal: Medications  Outcome: Progressing Towards Goal  Goal: Respiratory  Outcome: Progressing Towards Goal  Goal: Treatments/Interventions/Procedures  Outcome: Progressing Towards Goal  Goal: Psychosocial  Outcome: Progressing Towards Goal

## 2019-11-01 NOTE — PROGRESS NOTES
Bedside and Verbal shift change report given to [address] [place] [address]) by Candido Mosley nurse).  Report included the following information SBAR, Kardex, Recent Results and Med Rec Status.     Zone Phone:   [phone]        Significant changes during shift:  Patient  medicated for pain and nausea      Patient Information     Ethan Prasad  41 y.o.  10/29/2019 11:12 AM by Gauri Lagunas MD. Chapincito Castellon admitted from Home     Problem List               Patient Active Problem List     Diagnosis Date Noted    Hydronephrosis of right kidney 10/29/2019    DKA (diabetic ketoacidoses) ([address] 07/06/2019    Nausea with vomiting 04/16/2019    DKA, type 1, not at goal [facility]) 04/14/2019    DKA, type 1 ([address] 11/18/2018    Neuropathy 11/18/2018    Upper GI bleed 11/18/2018    Tachycardia 02/02/2014    Diabetic neuropathy, type I diabetes mellitus ([address] 02/02/2014    Tobacco abuse 08/18/2011       Class: Chronic    HTN (hypertension) 03/13/2011              Past Medical History:   Diagnosis Date    Bipolar 1 disorder, depressed ([address]      Bipolar disorder ([address]      Depression      Diabetes ([address]      DKA, type 1 ([address] 1/27/2013     diagnosed age 23    H/O noncompliance with medical treatment, presenting hazards to health      MRSA (methicillin resistant staph aureus) culture positive      MRSA (methicillin resistant Staphylococcus aureus)       Face    Noncompliance with medication regimen      Second hand smoke exposure      Seizure ([address]      Seizures ([address] 2006 or 2007     one episode during care home            Core Measures:     CVA: No No  CHF:No No  PNA:No No           Activity Status:     OOB to Chair No  Ambulated this shift No   Bed Rest yes     Supplemental X3: (JT Applicable)     NC No  NRB No  Venti-mask No  On  Liters/min        LINES AND DRAINS:  PIV: 20 right forearm  PIV 18 left AC           DVT prophylaxis:     DVT prophylaxis Med- No  DVT prophylaxis SCD or KEVIN- No      Wounds: (If Applicable)     Wounds- No     Location      Patient Safety:     Falls Score Total Score: 2  Safety Level_______  Bed Alarm On? No  Sitter? No     Plan for upcoming shift:  Encourage po fluids and food intake, ambulation        Discharge Plan: Home with hansen and Flomax, follow-up appointment with urology.      Active Consults:  IP CONSULT TO UROLOGY

## 2019-11-01 NOTE — PROGRESS NOTES
Reason for Admission:   Pt is admitted on 10/29/19 d/t Severe R sided hydronephrosis W massive distention of the bladder. Sepsis d/t hydronephrosis. Incidental finding of esophagitis on CT ABD. DM. Pt lives with mother in one story home with 1 step to enter. Pt is alert and oriented. No DME. No HH or SNF. Pharmacy: [facility] in [place]. RRAT Score:          8           Plan for utilizing home health:    [facility] and Pt is agreeable. Referral sent to [facility] and they can accept d/t dx of Sepsis. They can not see the Pt until seen by PCP per the liason                     Current Advanced Directive/Advance Care Plan: Full Code. NO AMD on file. Mother would be NOK. CM offered to have pastoral care do AMD and Pt refused. Transition of Care Plan:                   DC order written. Urology appt scheduled for 11/4/19    PCP scheduled for 11/8/19.  [facility] approved and they will be out after 11/8/19 appt. Friend transported home. Pt has completed FA application but not sure if they received. He will call to check status once he gets home and if not pending then he has another copy that can be submitted. No further CM needs. Care Management Interventions  PCP Verified by CM: Yes  Palliative Care Criteria Met (RRAT>21 & CHF Dx)?: No  Mode of Transport at Discharge:  Other (see comment)  Transition of Care Consult (CM Consult): Discharge Planning, [address]: Yes  MyChart Signup: No  Discharge Durable Medical Equipment: No  Physical Therapy Consult: No  Occupational Therapy Consult: No  Speech Therapy Consult: No  Current Support Network: Lives with Spouse, Own Home  Confirm Follow Up Transport: Family  Plan discussed with Pt/Family/Caregiver: Yes  Freedom of Choice Offered: Yes  Discharge Location  Discharge Placement: Home with home health    Qamar Goddard

## 2019-11-01 NOTE — DISCHARGE SUMMARY
Hospitalist Discharge Summary     Patient ID:  Funmi Busch  [id number]  41 y.o.  1980  10/29/2019    PCP on record: Mu Ervin NP    Admit date: 10/29/2019  Discharge date and time: 11/1/2019    DISCHARGE DIAGNOSIS:    Severe right-sided hydronephrosis  - US shows improvement     Sepsis, resolved  -Secondary to complicated UTI with hydronephrosis  - Q sofa score of 0    CONSULTATIONS:  IP CONSULT TO UROLOGY    Excerpted HPI from H&P of Ten Ritchie MD:  This is a 37-year-old male with past medical history of diabetes is coming to the hospital chief complaints of nausea, vomiting and right-sided abdominal pain since the last 3 days.  Patient reports being in his usual state of health until about 2 days ago when he started having right-sided flank pain which is 7 x 10, increased with movement and without any relieving factors.  He also reports nausea along with clear vomit.  He was also having high blood sugars at home. Amara Howell also had one episode of dark stool.  He does not report any chest pain or shortness of breath. On arrival to the hospital he was noted to be tachycardic and blood pressure was 143 x 120.  On lab work he was noted to have a hemoglobin of 17.1, leukocytosis of 18.2.  BMP showed a creatinine of 2.23. Amara Howell had a CT abdomen in the emergency department which showed evidence of severe right-sided hydronephrosis with massive distention of the bladder.  CT abdomen also showed marked thickening of the distal esophagus which can be seen with esophagitis.    ______________________________________________________________________  DISCHARGE SUMMARY/HOSPITAL COURSE:  for full details see H&P, daily progress notes, labs, consult notes. During his hospital stay, patient was evaluated by urology and recommendation was that no intervention, Beard catheter, Flomax, IV fluids.   His acute kidney injury has improved and his creatinine came back to normal limits on the day of discharge. Patient was tolerated diet very well and having normal bowel movements. Urology also recommended to discharge the patient on Flomax, Beard catheter and to follow-up as an outpatient with urology clinic. Patient was started on ceftriaxone in the ED he had for 3 days and will discharge him on Keflex 500 mg 3 times daily for 5 days. _______________________________________________________________________  Patient seen and examined by me on discharge day. Pertinent Findings:  Gen:    Not in distress  Chest: Clear lungs  CVS:   Regular rhythm. No edema  Abd:  Soft, not distended, not tender  Neuro:  Alert  _______________________________________________________________________  DISCHARGE MEDICATIONS:   Current Discharge Medication List      START taking these medications    Details   cephALEXin (KEFLEX) 500 mg capsule Take 1 Cap by mouth three (3) times daily for 5 days. Indications: Bacterial Urinary Tract Infection  Qty: 15 Cap, Refills: 0      tamsulosin (FLOMAX) 0.4 mg capsule Take 1 Cap by mouth daily. Qty: 30 Cap, Refills: 0         CONTINUE these medications which have NOT CHANGED    Details   insulin glargine (LANTUS U-100 INSULIN) 100 unit/mL injection 42 Units by SubCUTAneous route daily. insulin regular (NOVOLIN R) 100 unit/mL injection 2-10 Units by SubCUTAneous route Before breakfast, lunch, and dinner. Blood Glucose (mg/dL)       Insulin Dose (units)              150-200                               2               201-250                               4               251-300                               6               301-350                               8               >351                                   10               Patient Follow Up Instructions:    Activity: Activity as tolerated  Diet: Diabetic Diet  Wound Care: None needed      Follow-up Information     Follow up With Specialties Details Why Contact Info    Amadou Fontenot NP Nurse Practitioner   [phone] [place] [address] [place] [address] [address]  [phone]          ________________________________________________________________    Risk of deterioration: Low    Condition at Discharge:  Stable  __________________________________________________________________    Disposition  Home with family, no needs    ____________________________________________________________________    Code Status: Full Code  ___________________________________________________________________      Total time in minutes spent coordinating this discharge (includes going over instructions, follow-up, prescriptions, and preparing report for sign off to her PCP) :  40 minutes    Signed:  Martita Arana MD

## 2019-11-01 NOTE — PROGRESS NOTES
Bedside and Verbal shift change report given to [facility] by Johnnie Vargas RN (offgoing nurse).  Report included the following information SBAR, Kardex, Recent Results and Med Rec Status.     Zone Phone:   [phone]        Significant changes during shift:  Still complaint of foot and leg pain, feeling a little better eating a little better         Patient Information     Basia Ceja  41 y.o.  10/29/2019 11:12 AM by Roney Carter MD. Chapincito Khan admitted from Home     Problem List               Patient Active Problem List     Diagnosis Date Noted    Hydronephrosis of right kidney 10/29/2019    DKA (diabetic ketoacidoses) ([address] 07/06/2019    Nausea with vomiting 04/16/2019    DKA, type 1, not at goal [facility]) 04/14/2019    DKA, type 1 ([address] 11/18/2018    Neuropathy 11/18/2018    Upper GI bleed 11/18/2018    Tachycardia 02/02/2014    Diabetic neuropathy, type I diabetes mellitus ([address] 02/02/2014    Tobacco abuse 08/18/2011       Class: Chronic    HTN (hypertension) 03/13/2011              Past Medical History:   Diagnosis Date    Bipolar 1 disorder, depressed ([address]      Bipolar disorder ([address]      Depression      Diabetes ([address]      DKA, type 1 ([address] 1/27/2013     diagnosed age 23    H/O noncompliance with medical treatment, presenting hazards to health      MRSA (methicillin resistant staph aureus) culture positive      MRSA (methicillin resistant Staphylococcus aureus)       Face    Noncompliance with medication regimen      Second hand smoke exposure      Seizure ([address]      Seizures ([address] 2006 or 2007     one episode during senior living            Core Measures:     CVA: No No  CHF:No No  PNA:No No           Activity Status:     OOB to Chair No  Ambulated this shift No   Bed Rest yes     Supplemental F6: (ZY Applicable)     NC No  NRB No  Venti-mask No  On  Liters/min        LINES AND DRAINS:  PIV: 20 right forearm  PIV 18 left AC           DVT prophylaxis:     DVT prophylaxis Med- No  DVT prophylaxis SCD or KEVIN- No      Wounds: (If Applicable)     Wounds- No     Location      Patient Safety:     Falls Score Total Score: 2  Safety Level_______  Bed Alarm On? No  Sitter? No     Plan for upcoming shift:  Encourage po fluids and food intake, ambulation        Discharge Plan: Home with hansen and Flomax, follow-up appointment with urology.      Active Consults:  IP CONSULT TO UROLOGY

## 2019-11-01 NOTE — PROGRESS NOTES
1000: CCL to discharge patient with hansen and leg bag teaching; pt dressed and ready to go home. Denies further needs at this time.

## 2019-11-01 NOTE — PROGRESS NOTES
Patient: Erich Balderrama MRN: [id number]  SSN: [id number]    YOB: 1980  Age: 41 y.o. Sex: male        ADMITTED: 10/29/2019 to Kim Franco MD by Black Bolaños MD for Hydronephrosis of right kidney [N13.30]    Erich Balderrama is followed by Urology for right hydronephrosis. Renal ultrasound yesterday showing resolved right hydronephrosis. Residual right pelvocaliectasis. Nonobstructing residual right renal calculi. New left pelvocaliectasis. Afebrile. On abx, rocephin. On flomax. BUN-15/Cr-0.57      Vitals: Temp (24hrs), Av °F (36.7 °C), Min:97.7 °F (36.5 °C), Max:98.4 °F (36.9 °C)    Blood pressure (!) 147/96, pulse 86, temperature 98 °F (36.7 °C), resp. rate 16, height 5' 5\" (1.651 m), weight 59 kg (130 lb), SpO2 98 %. Intake and Output:  10/30 1901 -  0700  In: 2152 [P.O.:1552; I.V.:600]  Out: 3950 [Urine:3950]  No intake/output data recorded. WENDY Output lats 24 hrs: No data found. WENDY Output last 8 hrs: No data found. BM over last 24 hrs: No data found.     Exam:   Appearance: Well-developed no acute distress  Conjunctiva: Conjunctiva and lids normal  External ears/nose: Normal no lesions or deformities  Hearing: Grossly intact  Respiratory effort: Breathing easily  Lower extremity: No edema  Abdomen/flank: Soft, nontender, without masses, no CVA tenderness  Digits/nails: No clubbing cyanosis or petechiae  Skin inspection: Warm and dry  Judgment/Insight: intact  Mood/Affect: normal    Labs:  CBC:   Lab Results   Component Value Date/Time    WBC 9.6 2019 04:36 AM    HCT 39.8 2019 04:36 AM    PLATELET 376  04:36 AM     BMP:   Lab Results   Component Value Date/Time    Glucose 167 (H) 2019 04:36 AM    Sodium 134 (L) 2019 04:36 AM    Potassium 3.5 2019 04:36 AM    Chloride 100 2019 04:36 AM    CO2 25 2019 04:36 AM    BUN 15 2019 04:36 AM    Creatinine 0.57 (L) 2019 04:36 AM    Calcium 8.9 2019 04:36 AM Imaging: Renal U/S-10/31/2019  Resolved right hydronephrosis. Residual right pelvocaliectasis. Nonobstructing residual right renal calculi. New left pelvocaliectasis. Assessment/Plan:     1. Right hydronephrosis    -Renal US improved  -Will arrange outpatient follow up  -Continue flomax  -Leave fredy hansen to DC.     Signed By: Nicole Turner NP - November 1, 2019

## 2019-11-02 ENCOUNTER — HOSPITAL ENCOUNTER (INPATIENT)
Age: 39
LOS: 2 days | Discharge: HOME | DRG: 639 | End: 2019-11-04
Attending: EMERGENCY MEDICINE | Admitting: INTERNAL MEDICINE
Payer: SELF-PAY

## 2019-11-02 DIAGNOSIS — G62.9 NEUROPATHY: Primary | ICD-10-CM

## 2019-11-02 DIAGNOSIS — E10.10 TYPE 1 DIABETES MELLITUS WITH KETOACIDOSIS WITHOUT COMA (HCC): ICD-10-CM

## 2019-11-02 LAB
ADMINISTERED INITIALS, ADMINIT: NORMAL
ADMINISTERED INITIALS, ADMINIT: NORMAL
ALBUMIN SERPL-MCNC: 2.7 G/DL (ref 3.5–5)
ALBUMIN/GLOB SERPL: 0.8 {RATIO} (ref 1.1–2.2)
ALP SERPL-CCNC: 210 U/L (ref 45–117)
ALT SERPL-CCNC: 52 U/L (ref 12–78)
ANION GAP SERPL CALC-SCNC: 26 MMOL/L (ref 5–15)
APPEARANCE UR: CLEAR
ARTERIAL PATENCY WRIST A: ABNORMAL
AST SERPL-CCNC: 29 U/L (ref 15–37)
BACTERIA URNS QL MICRO: NEGATIVE /HPF
BASE DEFICIT BLDV-SCNC: 20 MMOL/L
BASOPHILS # BLD: 0 K/UL (ref 0–0.1)
BASOPHILS NFR BLD: 0 % (ref 0–1)
BDY SITE: ABNORMAL
BILIRUB SERPL-MCNC: 0.4 MG/DL (ref 0.2–1)
BILIRUB UR QL: NEGATIVE
BUN SERPL-MCNC: 22 MG/DL (ref 6–20)
BUN/CREAT SERPL: 23 (ref 12–20)
CALCIUM SERPL-MCNC: 8.4 MG/DL (ref 8.5–10.1)
CHLORIDE SERPL-SCNC: 94 MMOL/L (ref 97–108)
CO2 SERPL-SCNC: 8 MMOL/L (ref 21–32)
COLOR UR: ABNORMAL
CREAT SERPL-MCNC: 0.96 MG/DL (ref 0.7–1.3)
D50 ADMINISTERED, D50ADM: 0 ML
D50 ADMINISTERED, D50ADM: 0 ML
D50 ORDER, D50ORD: 0 ML
D50 ORDER, D50ORD: 0 ML
DIFFERENTIAL METHOD BLD: ABNORMAL
EOSINOPHIL # BLD: 0.1 K/UL (ref 0–0.4)
EOSINOPHIL NFR BLD: 1 % (ref 0–7)
EPITH CASTS URNS QL MICRO: ABNORMAL /LPF
ERYTHROCYTE [DISTWIDTH] IN BLOOD BY AUTOMATED COUNT: 11.9 % (ref 11.5–14.5)
GAS FLOW.O2 O2 DELIVERY SYS: ABNORMAL L/MIN
GLOBULIN SER CALC-MCNC: 3.4 G/DL (ref 2–4)
GLSCOM COMMENTS: NORMAL
GLSCOM COMMENTS: NORMAL
GLUCOSE BLD STRIP.AUTO-MCNC: 319 MG/DL (ref 65–100)
GLUCOSE BLD STRIP.AUTO-MCNC: 336 MG/DL (ref 65–100)
GLUCOSE BLD STRIP.AUTO-MCNC: 386 MG/DL (ref 65–100)
GLUCOSE BLD STRIP.AUTO-MCNC: 468 MG/DL (ref 65–100)
GLUCOSE BLD STRIP.AUTO-MCNC: 472 MG/DL (ref 65–100)
GLUCOSE BLD STRIP.AUTO-MCNC: 522 MG/DL (ref 65–100)
GLUCOSE SERPL-MCNC: 493 MG/DL (ref 65–100)
GLUCOSE UR STRIP.AUTO-MCNC: >1000 MG/DL
GLUCOSE, GLC: 319 MG/DL
GLUCOSE, GLC: 336 MG/DL
HCO3 BLDV-SCNC: 8.4 MMOL/L (ref 23–28)
HCT VFR BLD AUTO: 42.8 % (ref 36.6–50.3)
HGB BLD-MCNC: 14.8 G/DL (ref 12.1–17)
HGB UR QL STRIP: ABNORMAL
HIGH TARGET, HITG: 250 MG/DL
HIGH TARGET, HITG: 250 MG/DL
HYALINE CASTS URNS QL MICRO: ABNORMAL /LPF (ref 0–5)
IMM GRANULOCYTES # BLD AUTO: 0.1 K/UL (ref 0–0.04)
IMM GRANULOCYTES NFR BLD AUTO: 1 % (ref 0–0.5)
INSULIN ADMINSTERED, INSADM: 5.5 UNITS/HOUR
INSULIN ADMINSTERED, INSADM: 7.8 UNITS/HOUR
INSULIN ORDER, INSORD: 5.5 UNITS/HOUR
INSULIN ORDER, INSORD: 7.8 UNITS/HOUR
KETONES UR QL STRIP.AUTO: >80 MG/DL
LEUKOCYTE ESTERASE UR QL STRIP.AUTO: NEGATIVE
LOW TARGET, LOT: 150 MG/DL
LOW TARGET, LOT: 150 MG/DL
LYMPHOCYTES # BLD: 1.4 K/UL (ref 0.8–3.5)
LYMPHOCYTES NFR BLD: 14 % (ref 12–49)
MCH RBC QN AUTO: 31.8 PG (ref 26–34)
MCHC RBC AUTO-ENTMCNC: 34.6 G/DL (ref 30–36.5)
MCV RBC AUTO: 91.8 FL (ref 80–99)
MINUTES UNTIL NEXT BG, NBG: 60 MIN
MINUTES UNTIL NEXT BG, NBG: 60 MIN
MONOCYTES # BLD: 0.1 K/UL (ref 0–1)
MONOCYTES NFR BLD: 1 % (ref 5–13)
MULTIPLIER, MUL: 0.02
MULTIPLIER, MUL: 0.03
NEUTS SEG # BLD: 8.4 K/UL (ref 1.8–8)
NEUTS SEG NFR BLD: 83 % (ref 32–75)
NITRITE UR QL STRIP.AUTO: NEGATIVE
NRBC # BLD: 0 K/UL (ref 0–0.01)
NRBC BLD-RTO: 0 PER 100 WBC
O2/TOTAL GAS SETTING VFR VENT: 21 %
ORDER INITIALS, ORDINIT: NORMAL
ORDER INITIALS, ORDINIT: NORMAL
PCO2 BLDV: 22 MMHG (ref 41–51)
PH BLDV: 7.19 [PH] (ref 7.32–7.42)
PH UR STRIP: 5 [PH] (ref 5–8)
PLATELET # BLD AUTO: 372 K/UL (ref 150–400)
PMV BLD AUTO: 10.6 FL (ref 8.9–12.9)
PO2 BLDV: 42 MMHG (ref 25–40)
POTASSIUM SERPL-SCNC: 4 MMOL/L (ref 3.5–5.1)
PROT SERPL-MCNC: 6.1 G/DL (ref 6.4–8.2)
PROT UR STRIP-MCNC: 30 MG/DL
RBC # BLD AUTO: 4.66 M/UL (ref 4.1–5.7)
RBC #/AREA URNS HPF: ABNORMAL /HPF (ref 0–5)
SAO2 % BLDV: 67 % (ref 65–88)
SERVICE CMNT-IMP: ABNORMAL
SODIUM SERPL-SCNC: 128 MMOL/L (ref 136–145)
SP GR UR REFRACTOMETRY: 1.03 (ref 1–1.03)
SPECIMEN TYPE: ABNORMAL
TOTAL RESP. RATE, ITRR: 18
UA: UC IF INDICATED,UAUC: ABNORMAL
UROBILINOGEN UR QL STRIP.AUTO: 0.2 EU/DL (ref 0.2–1)
WBC # BLD AUTO: 10.1 K/UL (ref 4.1–11.1)
WBC URNS QL MICRO: ABNORMAL /HPF (ref 0–4)

## 2019-11-02 PROCEDURE — 94762 N-INVAS EAR/PLS OXIMTRY CONT: CPT

## 2019-11-02 PROCEDURE — 85025 COMPLETE CBC W/AUTO DIFF WBC: CPT

## 2019-11-02 PROCEDURE — 74011000258 HC RX REV CODE- 258: Performed by: INTERNAL MEDICINE

## 2019-11-02 PROCEDURE — 96375 TX/PRO/DX INJ NEW DRUG ADDON: CPT

## 2019-11-02 PROCEDURE — 96374 THER/PROPH/DIAG INJ IV PUSH: CPT

## 2019-11-02 PROCEDURE — 65660000000 HC RM CCU STEPDOWN

## 2019-11-02 PROCEDURE — 74011250636 HC RX REV CODE- 250/636: Performed by: EMERGENCY MEDICINE

## 2019-11-02 PROCEDURE — 74011250636 HC RX REV CODE- 250/636: Performed by: INTERNAL MEDICINE

## 2019-11-02 PROCEDURE — 82803 BLOOD GASES ANY COMBINATION: CPT

## 2019-11-02 PROCEDURE — 74011636637 HC RX REV CODE- 636/637: Performed by: EMERGENCY MEDICINE

## 2019-11-02 PROCEDURE — 99285 EMERGENCY DEPT VISIT HI MDM: CPT

## 2019-11-02 PROCEDURE — 74011636637 HC RX REV CODE- 636/637: Performed by: INTERNAL MEDICINE

## 2019-11-02 PROCEDURE — 81001 URINALYSIS AUTO W/SCOPE: CPT

## 2019-11-02 PROCEDURE — 74011250637 HC RX REV CODE- 250/637: Performed by: INTERNAL MEDICINE

## 2019-11-02 PROCEDURE — 80053 COMPREHEN METABOLIC PANEL: CPT

## 2019-11-02 PROCEDURE — 36415 COLL VENOUS BLD VENIPUNCTURE: CPT

## 2019-11-02 PROCEDURE — 96361 HYDRATE IV INFUSION ADD-ON: CPT

## 2019-11-02 PROCEDURE — 82962 GLUCOSE BLOOD TEST: CPT

## 2019-11-02 RX ORDER — SODIUM CHLORIDE 0.9 % (FLUSH) 0.9 %
5-40 SYRINGE (ML) INJECTION EVERY 8 HOURS
Status: DISCONTINUED | OUTPATIENT
Start: 2019-11-02 | End: 2019-11-04 | Stop reason: HOSPADM

## 2019-11-02 RX ORDER — DEXTROSE 50 % IN WATER (D50W) INTRAVENOUS SYRINGE
25-50 AS NEEDED
Status: DISCONTINUED | OUTPATIENT
Start: 2019-11-02 | End: 2019-11-04

## 2019-11-02 RX ORDER — HEPARIN SODIUM 5000 [USP'U]/ML
5000 INJECTION, SOLUTION INTRAVENOUS; SUBCUTANEOUS EVERY 8 HOURS
Status: DISCONTINUED | OUTPATIENT
Start: 2019-11-02 | End: 2019-11-04 | Stop reason: HOSPADM

## 2019-11-02 RX ORDER — MAGNESIUM SULFATE 100 %
4 CRYSTALS MISCELLANEOUS AS NEEDED
Status: DISCONTINUED | OUTPATIENT
Start: 2019-11-02 | End: 2019-11-04

## 2019-11-02 RX ORDER — TAMSULOSIN HYDROCHLORIDE 0.4 MG/1
0.4 CAPSULE ORAL DAILY
Status: DISCONTINUED | OUTPATIENT
Start: 2019-11-03 | End: 2019-11-04 | Stop reason: HOSPADM

## 2019-11-02 RX ORDER — ONDANSETRON 2 MG/ML
4 INJECTION INTRAMUSCULAR; INTRAVENOUS
Status: DISCONTINUED | OUTPATIENT
Start: 2019-11-02 | End: 2019-11-04 | Stop reason: HOSPADM

## 2019-11-02 RX ORDER — DOCUSATE SODIUM 100 MG/1
100 CAPSULE, LIQUID FILLED ORAL
Status: DISCONTINUED | OUTPATIENT
Start: 2019-11-02 | End: 2019-11-04 | Stop reason: HOSPADM

## 2019-11-02 RX ORDER — ONDANSETRON 2 MG/ML
4 INJECTION INTRAMUSCULAR; INTRAVENOUS
Status: COMPLETED | OUTPATIENT
Start: 2019-11-02 | End: 2019-11-02

## 2019-11-02 RX ORDER — INSULIN LISPRO 100 [IU]/ML
INJECTION, SOLUTION INTRAVENOUS; SUBCUTANEOUS
Status: DISCONTINUED | OUTPATIENT
Start: 2019-11-03 | End: 2019-11-04

## 2019-11-02 RX ORDER — CEPHALEXIN 250 MG/1
500 CAPSULE ORAL 3 TIMES DAILY
Status: DISCONTINUED | OUTPATIENT
Start: 2019-11-02 | End: 2019-11-04 | Stop reason: HOSPADM

## 2019-11-02 RX ORDER — SODIUM CHLORIDE 9 MG/ML
100 INJECTION, SOLUTION INTRAVENOUS CONTINUOUS
Status: DISCONTINUED | OUTPATIENT
Start: 2019-11-02 | End: 2019-11-04

## 2019-11-02 RX ORDER — ACETAMINOPHEN 325 MG/1
650 TABLET ORAL
Status: DISCONTINUED | OUTPATIENT
Start: 2019-11-02 | End: 2019-11-04 | Stop reason: HOSPADM

## 2019-11-02 RX ORDER — SODIUM CHLORIDE 0.9 % (FLUSH) 0.9 %
5-40 SYRINGE (ML) INJECTION AS NEEDED
Status: DISCONTINUED | OUTPATIENT
Start: 2019-11-02 | End: 2019-11-04 | Stop reason: HOSPADM

## 2019-11-02 RX ADMIN — SODIUM CHLORIDE 1000 ML: 900 INJECTION, SOLUTION INTRAVENOUS at 19:40

## 2019-11-02 RX ADMIN — CEPHALEXIN 500 MG: 250 CAPSULE ORAL at 22:33

## 2019-11-02 RX ADMIN — ONDANSETRON 4 MG: 2 INJECTION INTRAMUSCULAR; INTRAVENOUS at 20:09

## 2019-11-02 RX ADMIN — HEPARIN SODIUM 5000 UNITS: 5000 INJECTION INTRAVENOUS; SUBCUTANEOUS at 22:36

## 2019-11-02 RX ADMIN — SODIUM CHLORIDE 5.5 UNITS/HR: 900 INJECTION, SOLUTION INTRAVENOUS at 21:36

## 2019-11-02 RX ADMIN — HUMAN INSULIN 10 UNITS: 100 INJECTION, SOLUTION SUBCUTANEOUS at 19:39

## 2019-11-02 RX ADMIN — SODIUM CHLORIDE 100 ML/HR: 900 INJECTION, SOLUTION INTRAVENOUS at 22:12

## 2019-11-02 RX ADMIN — Medication 10 ML: at 22:33

## 2019-11-02 RX ADMIN — ONDANSETRON 4 MG: 2 INJECTION INTRAMUSCULAR; INTRAVENOUS at 22:33

## 2019-11-02 NOTE — ED PROVIDER NOTES
EMERGENCY DEPARTMENT HISTORY AND PHYSICAL EXAM      Date: 11/2/2019  Patient Name: Erich Balderrama  Patient Age and Sex: 41 y.o. male     History of Presenting Illness     Chief Complaint   Patient presents with    High Blood Sugar     Patient recently admitted for DKA and has high BS (500's per EMS)    Vomiting     N/V all day today wt abdominal pain       History Provided By: Patient, ems    HPI: Erich Balderrama is a 37-year-old male with a history of type 1 diabetes presenting for nausea and vomiting. Patient was just admitted and discharged yesterday for sepsis secondary to UTI with hydronephrosis. States he was doing okay at home and then at 1 PM today started having worsening nausea, vomiting and abdominal pain. States that his abdominal pain is periumbilical and sharp. It is currently a 10 out of 10. Not associated with any diarrhea, constipation or fevers. Denies any dysuria. States that his glucose was also elevated and EMS concurs. There are no other complaints, changes, or physical findings at this time. PCP: Negin Kumar, NP    No current facility-administered medications on file prior to encounter. Current Outpatient Medications on File Prior to Encounter   Medication Sig Dispense Refill    cephALEXin (KEFLEX) 500 mg capsule Take 1 Cap by mouth three (3) times daily for 5 days. Indications: Bacterial Urinary Tract Infection 15 Cap 0    tamsulosin (FLOMAX) 0.4 mg capsule Take 1 Cap by mouth daily. 30 Cap 0    insulin glargine (LANTUS U-100 INSULIN) 100 unit/mL injection 42 Units by SubCUTAneous route daily.  insulin regular (NOVOLIN R) 100 unit/mL injection 2-10 Units by SubCUTAneous route Before breakfast, lunch, and dinner.  Blood Glucose (mg/dL)       Insulin Dose (units)              150-200                               2               201-250                               4               251-300                               6               301-350 8               >351                                   10         Past History     Past Medical History:  Past Medical History:   Diagnosis Date    [facility] disorder[facility] depressed [facility]     Bipolar disorder ([address]     Depression     Diabetes ([address]     DKA, type 1 ([address] 1/27/2013    diagnosed age 23    H/O noncompliance with medical treatment, presenting hazards to health     MRSA (methicillin resistant staph aureus) culture positive     MRSA (methicillin resistant Staphylococcus aureus)     Face    Noncompliance with medication regimen     Second hand smoke exposure     Seizure ([address]     Seizures ([address] 2006 or 2007    one episode during senior care       Past Surgical History:  Past Surgical History:   Procedure Laterality Date    HX HEENT      top left wisdom tooth    HX ORTHOPAEDIC Left     wrist; MCV    UPPER GI ENDOSCOPY,BIOPSY  11/20/2018            Family History:  Family History   Problem Relation Age of Onset    Diabetes Mother     Diabetes Other         neice, type 1        Social History:  Social History     Tobacco Use    Smoking status: Current Some Day Smoker     Packs/day: 0.10     Years: 16.00     Pack years: 1.60     Types: Cigarettes    Smokeless tobacco: Never Used   Substance Use Topics    Alcohol use: No    Drug use: No       Allergies:  No Known Allergies      Review of Systems   Review of Systems   Constitutional: Negative for chills and fever. Respiratory: Negative for cough and shortness of breath. Cardiovascular: Negative for chest pain. Gastrointestinal: Positive for abdominal pain, nausea and vomiting. Negative for constipation and diarrhea. Neurological: Negative for weakness and numbness. All other systems reviewed and are negative. Physical Exam   Physical Exam   Constitutional: He is oriented to person, place, and time. He appears well-developed and well-nourished. He appears distressed.    Thin male who is actively retching into emesis bag   HENT:   Head: Normocephalic and atraumatic. Eyes: Conjunctivae and EOM are normal.   Neck: Normal range of motion. Neck supple. Cardiovascular: Normal rate and regular rhythm. Pulmonary/Chest: Effort normal and breath sounds normal. No respiratory distress. Abdominal: Soft. He exhibits no distension. There is tenderness. Diffuse tenderness   Musculoskeletal: Normal range of motion. Neurological: He is alert and oriented to person, place, and time. Skin: Skin is warm and dry. Psychiatric: He has a normal mood and affect. Nursing note and vitals reviewed. Diagnostic Study Results     Labs -     Recent Results (from the past 12 hour(s))   GLUCOSE, POC    Collection Time: 11/02/19  7:15 PM   Result Value Ref Range    Glucose (POC) 468 (H) 65 - 100 mg/dL    Performed by Teena Warren RN    GLUCOSE, POC    Collection Time: 11/02/19  7:37 PM   Result Value Ref Range    Glucose (POC) 522 (H) 65 - 100 mg/dL    Performed by Eulalia Schroeder RN (traveler)    CBC WITH AUTOMATED DIFF    Collection Time: 11/02/19  7:50 PM   Result Value Ref Range    WBC 10.1 4.1 - 11.1 K/uL    RBC 4.66 4.10 - 5.70 M/uL    HGB 14.8 12.1 - 17.0 g/dL    HCT 42.8 36.6 - 50.3 %    MCV 91.8 80.0 - 99.0 FL    MCH 31.8 26.0 - 34.0 PG    MCHC 34.6 30.0 - 36.5 g/dL    RDW 11.9 11.5 - 14.5 %    PLATELET 838 962 - 409 K/uL    MPV 10.6 8.9 - 12.9 FL    NRBC 0.0 0  WBC    ABSOLUTE NRBC 0.00 0.00 - 0.01 K/uL    NEUTROPHILS 83 (H) 32 - 75 %    LYMPHOCYTES 14 12 - 49 %    MONOCYTES 1 (L) 5 - 13 %    EOSINOPHILS 1 0 - 7 %    BASOPHILS 0 0 - 1 %    IMMATURE GRANULOCYTES 1 (H) 0.0 - 0.5 %    ABS. NEUTROPHILS 8.4 (H) 1.8 - 8.0 K/UL    ABS. LYMPHOCYTES 1.4 0.8 - 3.5 K/UL    ABS. MONOCYTES 0.1 0.0 - 1.0 K/UL    ABS. EOSINOPHILS 0.1 0.0 - 0.4 K/UL    ABS. BASOPHILS 0.0 0.0 - 0.1 K/UL    ABS. IMM.  GRANS. 0.1 (H) 0.00 - 0.04 K/UL    DF AUTOMATED     METABOLIC PANEL, COMPREHENSIVE    Collection Time: 11/02/19  7:50 PM   Result Value Ref Range    Sodium 128 (L) 136 - 145 mmol/L    Potassium 4.0 3.5 - 5.1 mmol/L    Chloride 94 (L) 97 - 108 mmol/L    CO2 8 (LL) 21 - 32 mmol/L    Anion gap 26 (H) 5 - 15 mmol/L    Glucose 493 (H) 65 - 100 mg/dL    BUN 22 (H) 6 - 20 MG/DL    Creatinine 0.96 0.70 - 1.30 MG/DL    BUN/Creatinine ratio 23 (H) 12 - 20      GFR est AA >60 >60 ml/min/1.73m2    GFR est non-AA >60 >60 ml/min/1.73m2    Calcium 8.4 (L) 8.5 - 10.1 MG/DL    Bilirubin, total 0.4 0.2 - 1.0 MG/DL    ALT (SGPT) 52 12 - 78 U/L    AST (SGOT) 29 15 - 37 U/L    Alk. phosphatase 210 (H) 45 - 117 U/L    Protein, total 6.1 (L) 6.4 - 8.2 g/dL    Albumin 2.7 (L) 3.5 - 5.0 g/dL    Globulin 3.4 2.0 - 4.0 g/dL    A-G Ratio 0.8 (L) 1.1 - 2.2     URINALYSIS W/ REFLEX CULTURE    Collection Time: 11/02/19  7:50 PM   Result Value Ref Range    Color YELLOW/STRAW      Appearance CLEAR CLEAR      Specific gravity 1.030 1.003 - 1.030      pH (UA) 5.0 5.0 - 8.0      Protein 30 (A) NEG mg/dL    Glucose >1,000 (A) NEG mg/dL    Ketone >80 (A) NEG mg/dL    Bilirubin NEGATIVE  NEG      Blood LARGE (A) NEG      Urobilinogen 0.2 0.2 - 1.0 EU/dL    Nitrites NEGATIVE  NEG      Leukocyte Esterase NEGATIVE  NEG      UA:UC IF INDICATED CULTURE NOT INDICATED BY UA RESULT CNI      WBC 0-4 0 - 4 /hpf    RBC 10-20 0 - 5 /hpf    Epithelial cells FEW FEW /lpf    Bacteria NEGATIVE  NEG /hpf    Hyaline cast 2-5 0 - 5 /lpf   GLUCOSE, POC    Collection Time: 11/02/19  8:07 PM   Result Value Ref Range    Glucose (POC) 472 (H) 65 - 100 mg/dL    Performed by Nickie Roberts RN (traveler)    POC VENOUS BLOOD GAS    Collection Time: 11/02/19  8:50 PM   Result Value Ref Range    Device: ROOM AIR      FIO2 (POC) 21 %    pH, venous (POC) 7.187 (LL) 7.32 - 7.42      pCO2, venous (POC) 22.0 (L) 41 - 51 MMHG    pO2, venous (POC) 42 (H) 25 - 40 mmHg    HCO3, venous (POC) 8.4 (L) 23.0 - 28.0 MMOL/L    sO2, venous (POC) 67 65 - 88 %    Base deficit, venous (POC) 20 mmol/L    Allens test (POC) N/A      Total resp. rate 18      Site OTHER      Specimen type (POC) VENOUS BLOOD     GLUCOSE, POC    Collection Time: 11/02/19  8:51 PM   Result Value Ref Range    Glucose (POC) 386 (H) 65 - 100 mg/dL    Performed by Nickie Roberts RN (traveler)    GLUCOSE, POC    Collection Time: 11/02/19  9:38 PM   Result Value Ref Range    Glucose (POC) 336 (H) 65 - 100 mg/dL    Performed by Nickie Roberts RN (traveler)    GLUCOSTABILIZER    Collection Time: 11/02/19  9:45 PM   Result Value Ref Range    Glucose 336 mg/dL    Insulin order 5.5 units/hour    Insulin adminstered 5.5 units/hour    Multiplier 0.020     Low target 150 mg/dL    High target 250 mg/dL    D50 order 0.0 ml    D50 administered 0.00 ml    Minutes until next BG 60 min    Order initials mc     Administered initials      GLSCOM Comments         Radiologic Studies -   No orders to display     CT Results  (Last 48 hours)    None        CXR Results  (Last 48 hours)    None            Medical Decision Making   I am the first provider for this patient. I reviewed the vital signs, available nursing notes, past medical history, past surgical history, family history and social history. Vital Signs-Reviewed the patient's vital signs. Patient Vitals for the past 12 hrs:   Temp Pulse Resp BP SpO2   11/02/19 2145 -- 97 -- 126/83 99 %   11/02/19 2130 -- -- -- 138/88 99 %   11/02/19 2122 -- -- -- -- 99 %   11/02/19 2121 -- -- -- (!) 161/93 --   11/02/19 2115 -- -- -- (!) 144/93 --   11/02/19 1917 97.6 °F (36.4 °C) (!) 114 17 128/79 100 %       Records Reviewed: Nursing Notes and Old Medical Records    Provider Notes (Medical Decision Making):   She presenting with hyperglycemia and abdominal pain. Will get urinalysis to rule out UTI that has been persistent, versus gastritis versus all related to DKA versus just hyperglycemia. Given his glucose is elevated we will start with insulin and fluids. ED Course:   Initial assessment performed.  The patients presenting problems have been discussed, and they are in agreement with the care plan formulated and outlined with them. I have encouraged them to ask questions as they arise throughout their visit. Critical Care Time:   CRITICAL CARE NOTE :    7:22 PM    IMPENDING DETERIORATION -Cardiovascular, Metabolic and Renal  ASSOCIATED RISK FACTORS - Hypotension, Shock and Metabolic changes  MANAGEMENT- Bedside Assessment and Supervision of Care  INTERPRETATION -  ECG, Blood Pressure and Cardiac Output Measures   INTERVENTIONS - hemodynamic mngmt and Metobolic interventions  CASE REVIEW - Hospitalist, Nursing and Family  TREATMENT RESPONSE -Improved  PERFORMED BY - Self    NOTES   :    I have spent 40 minutes of critical care time involved in lab review, consultations with specialist, family decision- making, bedside attention and documentation. During this entire length of time I was immediately available to the patient . Disposition:    Admission Note:  Patient is being admitted to the hospital by Dr. Sofi Perry, Service: Hospitalist.  The results of their tests and reasons for their admission have been discussed with them and available family. They convey agreement and understanding for the need to be admitted and for their admission diagnosis. Diagnosis     Clinical Impression:   1. Type 1 diabetes mellitus with ketoacidosis without coma (HCC)        Attestations:  Rhonda Farmer M.D. Please note that this dictation was completed with [employer], the computer voice recognition software. Quite often unanticipated grammatical, syntax, homophones, and other interpretive errors are inadvertently transcribed by the computer software. Please disregard these errors. Please excuse any errors that have escaped final proofreading. Thank you.

## 2019-11-03 LAB
ADMINISTERED INITIALS, ADMINIT: NORMAL
ANION GAP SERPL CALC-SCNC: 13 MMOL/L (ref 5–15)
ANION GAP SERPL CALC-SCNC: 16 MMOL/L (ref 5–15)
ANION GAP SERPL CALC-SCNC: 19 MMOL/L (ref 5–15)
ANION GAP SERPL CALC-SCNC: 7 MMOL/L (ref 5–15)
ANION GAP SERPL CALC-SCNC: 9 MMOL/L (ref 5–15)
BASOPHILS # BLD: 0 K/UL (ref 0–0.1)
BASOPHILS NFR BLD: 0 % (ref 0–1)
BUN SERPL-MCNC: 13 MG/DL (ref 6–20)
BUN SERPL-MCNC: 15 MG/DL (ref 6–20)
BUN SERPL-MCNC: 17 MG/DL (ref 6–20)
BUN SERPL-MCNC: 19 MG/DL (ref 6–20)
BUN SERPL-MCNC: 19 MG/DL (ref 6–20)
BUN/CREAT SERPL: 15 (ref 12–20)
BUN/CREAT SERPL: 15 (ref 12–20)
BUN/CREAT SERPL: 17 (ref 12–20)
BUN/CREAT SERPL: 19 (ref 12–20)
BUN/CREAT SERPL: 20 (ref 12–20)
CALCIUM SERPL-MCNC: 7.6 MG/DL (ref 8.5–10.1)
CALCIUM SERPL-MCNC: 8.2 MG/DL (ref 8.5–10.1)
CALCIUM SERPL-MCNC: 8.3 MG/DL (ref 8.5–10.1)
CALCIUM SERPL-MCNC: 8.5 MG/DL (ref 8.5–10.1)
CALCIUM SERPL-MCNC: 8.5 MG/DL (ref 8.5–10.1)
CHLORIDE SERPL-SCNC: 100 MMOL/L (ref 97–108)
CHLORIDE SERPL-SCNC: 100 MMOL/L (ref 97–108)
CHLORIDE SERPL-SCNC: 101 MMOL/L (ref 97–108)
CHLORIDE SERPL-SCNC: 103 MMOL/L (ref 97–108)
CHLORIDE SERPL-SCNC: 104 MMOL/L (ref 97–108)
CO2 SERPL-SCNC: 13 MMOL/L (ref 21–32)
CO2 SERPL-SCNC: 17 MMOL/L (ref 21–32)
CO2 SERPL-SCNC: 19 MMOL/L (ref 21–32)
CO2 SERPL-SCNC: 21 MMOL/L (ref 21–32)
CO2 SERPL-SCNC: 21 MMOL/L (ref 21–32)
COMMENT, HOLDF: NORMAL
CREAT SERPL-MCNC: 0.87 MG/DL (ref 0.7–1.3)
CREAT SERPL-MCNC: 0.96 MG/DL (ref 0.7–1.3)
CREAT SERPL-MCNC: 0.98 MG/DL (ref 0.7–1.3)
CREAT SERPL-MCNC: 0.99 MG/DL (ref 0.7–1.3)
CREAT SERPL-MCNC: 1 MG/DL (ref 0.7–1.3)
D50 ADMINISTERED, D50ADM: 0 ML
D50 ORDER, D50ORD: 0 ML
DIFFERENTIAL METHOD BLD: ABNORMAL
EOSINOPHIL # BLD: 0.1 K/UL (ref 0–0.4)
EOSINOPHIL NFR BLD: 1 % (ref 0–7)
ERYTHROCYTE [DISTWIDTH] IN BLOOD BY AUTOMATED COUNT: 12.1 % (ref 11.5–14.5)
EST. AVERAGE GLUCOSE BLD GHB EST-MCNC: 358 MG/DL
GLSCOM COMMENTS: NORMAL
GLUCOSE BLD STRIP.AUTO-MCNC: 141 MG/DL (ref 65–100)
GLUCOSE BLD STRIP.AUTO-MCNC: 148 MG/DL (ref 65–100)
GLUCOSE BLD STRIP.AUTO-MCNC: 148 MG/DL (ref 65–100)
GLUCOSE BLD STRIP.AUTO-MCNC: 155 MG/DL (ref 65–100)
GLUCOSE BLD STRIP.AUTO-MCNC: 159 MG/DL (ref 65–100)
GLUCOSE BLD STRIP.AUTO-MCNC: 161 MG/DL (ref 65–100)
GLUCOSE BLD STRIP.AUTO-MCNC: 163 MG/DL (ref 65–100)
GLUCOSE BLD STRIP.AUTO-MCNC: 163 MG/DL (ref 65–100)
GLUCOSE BLD STRIP.AUTO-MCNC: 167 MG/DL (ref 65–100)
GLUCOSE BLD STRIP.AUTO-MCNC: 169 MG/DL (ref 65–100)
GLUCOSE BLD STRIP.AUTO-MCNC: 180 MG/DL (ref 65–100)
GLUCOSE BLD STRIP.AUTO-MCNC: 188 MG/DL (ref 65–100)
GLUCOSE BLD STRIP.AUTO-MCNC: 201 MG/DL (ref 65–100)
GLUCOSE BLD STRIP.AUTO-MCNC: 206 MG/DL (ref 65–100)
GLUCOSE BLD STRIP.AUTO-MCNC: 209 MG/DL (ref 65–100)
GLUCOSE BLD STRIP.AUTO-MCNC: 229 MG/DL (ref 65–100)
GLUCOSE BLD STRIP.AUTO-MCNC: 257 MG/DL (ref 65–100)
GLUCOSE BLD STRIP.AUTO-MCNC: 264 MG/DL (ref 65–100)
GLUCOSE SERPL-MCNC: 165 MG/DL (ref 65–100)
GLUCOSE SERPL-MCNC: 190 MG/DL (ref 65–100)
GLUCOSE SERPL-MCNC: 196 MG/DL (ref 65–100)
GLUCOSE SERPL-MCNC: 205 MG/DL (ref 65–100)
GLUCOSE SERPL-MCNC: 241 MG/DL (ref 65–100)
GLUCOSE, GLC: 141 MG/DL
GLUCOSE, GLC: 148 MG/DL
GLUCOSE, GLC: 148 MG/DL
GLUCOSE, GLC: 155 MG/DL
GLUCOSE, GLC: 159 MG/DL
GLUCOSE, GLC: 161 MG/DL
GLUCOSE, GLC: 163 MG/DL
GLUCOSE, GLC: 167 MG/DL
GLUCOSE, GLC: 169 MG/DL
GLUCOSE, GLC: 180 MG/DL
GLUCOSE, GLC: 188 MG/DL
GLUCOSE, GLC: 201 MG/DL
GLUCOSE, GLC: 206 MG/DL
GLUCOSE, GLC: 209 MG/DL
GLUCOSE, GLC: 229 MG/DL
GLUCOSE, GLC: 257 MG/DL
GLUCOSE, GLC: 264 MG/DL
HBA1C MFR BLD: 14.1 % (ref 4.2–6.3)
HCT VFR BLD AUTO: 40.8 % (ref 36.6–50.3)
HGB BLD-MCNC: 14.6 G/DL (ref 12.1–17)
HIGH TARGET, HITG: 250 MG/DL
IMM GRANULOCYTES # BLD AUTO: 0 K/UL (ref 0–0.04)
IMM GRANULOCYTES NFR BLD AUTO: 0 % (ref 0–0.5)
INSULIN ADMINSTERED, INSADM: 0 UNITS/HOUR
INSULIN ADMINSTERED, INSADM: 0.1 UNITS/HOUR
INSULIN ADMINSTERED, INSADM: 0.1 UNITS/HOUR
INSULIN ADMINSTERED, INSADM: 0.8 UNITS/HOUR
INSULIN ADMINSTERED, INSADM: 1 UNITS/HOUR
INSULIN ADMINSTERED, INSADM: 1.8 UNITS/HOUR
INSULIN ADMINSTERED, INSADM: 1.9 UNITS/HOUR
INSULIN ADMINSTERED, INSADM: 2 UNITS/HOUR
INSULIN ADMINSTERED, INSADM: 2.1 UNITS/HOUR
INSULIN ADMINSTERED, INSADM: 2.4 UNITS/HOUR
INSULIN ADMINSTERED, INSADM: 2.6 UNITS/HOUR
INSULIN ADMINSTERED, INSADM: 2.9 UNITS/HOUR
INSULIN ADMINSTERED, INSADM: 3 UNITS/HOUR
INSULIN ADMINSTERED, INSADM: 3 UNITS/HOUR
INSULIN ADMINSTERED, INSADM: 3.1 UNITS/HOUR
INSULIN ADMINSTERED, INSADM: 3.9 UNITS/HOUR
INSULIN ADMINSTERED, INSADM: 4.2 UNITS/HOUR
INSULIN ORDER, INSORD: 0 UNITS/HOUR
INSULIN ORDER, INSORD: 0.1 UNITS/HOUR
INSULIN ORDER, INSORD: 0.1 UNITS/HOUR
INSULIN ORDER, INSORD: 0.8 UNITS/HOUR
INSULIN ORDER, INSORD: 1 UNITS/HOUR
INSULIN ORDER, INSORD: 1.8 UNITS/HOUR
INSULIN ORDER, INSORD: 1.9 UNITS/HOUR
INSULIN ORDER, INSORD: 2 UNITS/HOUR
INSULIN ORDER, INSORD: 2.1 UNITS/HOUR
INSULIN ORDER, INSORD: 2.4 UNITS/HOUR
INSULIN ORDER, INSORD: 2.6 UNITS/HOUR
INSULIN ORDER, INSORD: 2.9 UNITS/HOUR
INSULIN ORDER, INSORD: 3 UNITS/HOUR
INSULIN ORDER, INSORD: 3 UNITS/HOUR
INSULIN ORDER, INSORD: 3.1 UNITS/HOUR
INSULIN ORDER, INSORD: 3.9 UNITS/HOUR
INSULIN ORDER, INSORD: 4.2 UNITS/HOUR
LIPASE SERPL-CCNC: 214 U/L (ref 73–393)
LOW TARGET, LOT: 150 MG/DL
LYMPHOCYTES # BLD: 2.5 K/UL (ref 0.8–3.5)
LYMPHOCYTES NFR BLD: 20 % (ref 12–49)
MAGNESIUM SERPL-MCNC: 1.7 MG/DL (ref 1.6–2.4)
MAGNESIUM SERPL-MCNC: 1.9 MG/DL (ref 1.6–2.4)
MAGNESIUM SERPL-MCNC: 1.9 MG/DL (ref 1.6–2.4)
MAGNESIUM SERPL-MCNC: 2 MG/DL (ref 1.6–2.4)
MAGNESIUM SERPL-MCNC: 2.1 MG/DL (ref 1.6–2.4)
MCH RBC QN AUTO: 31.5 PG (ref 26–34)
MCHC RBC AUTO-ENTMCNC: 35.8 G/DL (ref 30–36.5)
MCV RBC AUTO: 88.1 FL (ref 80–99)
MINUTES UNTIL NEXT BG, NBG: 120 MIN
MINUTES UNTIL NEXT BG, NBG: 60 MIN
MONOCYTES # BLD: 0.7 K/UL (ref 0–1)
MONOCYTES NFR BLD: 6 % (ref 5–13)
MULTIPLIER, MUL: 0
MULTIPLIER, MUL: 0.01
MULTIPLIER, MUL: 0.02
MULTIPLIER, MUL: 0.03
NEUTS SEG # BLD: 9.1 K/UL (ref 1.8–8)
NEUTS SEG NFR BLD: 73 % (ref 32–75)
NRBC # BLD: 0 K/UL (ref 0–0.01)
NRBC BLD-RTO: 0 PER 100 WBC
ORDER INITIALS, ORDINIT: NORMAL
PHOSPHATE SERPL-MCNC: 2.4 MG/DL (ref 2.6–4.7)
PLATELET # BLD AUTO: 366 K/UL (ref 150–400)
PMV BLD AUTO: 10.2 FL (ref 8.9–12.9)
POTASSIUM SERPL-SCNC: 3.9 MMOL/L (ref 3.5–5.1)
POTASSIUM SERPL-SCNC: 3.9 MMOL/L (ref 3.5–5.1)
POTASSIUM SERPL-SCNC: 4 MMOL/L (ref 3.5–5.1)
POTASSIUM SERPL-SCNC: 4.2 MMOL/L (ref 3.5–5.1)
POTASSIUM SERPL-SCNC: 4.3 MMOL/L (ref 3.5–5.1)
RBC # BLD AUTO: 4.63 M/UL (ref 4.1–5.7)
RBC MORPH BLD: ABNORMAL
SAMPLES BEING HELD,HOLD: NORMAL
SERVICE CMNT-IMP: ABNORMAL
SODIUM SERPL-SCNC: 128 MMOL/L (ref 136–145)
SODIUM SERPL-SCNC: 130 MMOL/L (ref 136–145)
SODIUM SERPL-SCNC: 134 MMOL/L (ref 136–145)
SODIUM SERPL-SCNC: 135 MMOL/L (ref 136–145)
SODIUM SERPL-SCNC: 136 MMOL/L (ref 136–145)
WBC # BLD AUTO: 12.4 K/UL (ref 4.1–11.1)

## 2019-11-03 PROCEDURE — 74011250637 HC RX REV CODE- 250/637: Performed by: INTERNAL MEDICINE

## 2019-11-03 PROCEDURE — 82962 GLUCOSE BLOOD TEST: CPT

## 2019-11-03 PROCEDURE — 65660000000 HC RM CCU STEPDOWN

## 2019-11-03 PROCEDURE — 36415 COLL VENOUS BLD VENIPUNCTURE: CPT

## 2019-11-03 PROCEDURE — 83735 ASSAY OF MAGNESIUM: CPT

## 2019-11-03 PROCEDURE — 74011250636 HC RX REV CODE- 250/636: Performed by: INTERNAL MEDICINE

## 2019-11-03 PROCEDURE — 74011250636 HC RX REV CODE- 250/636: Performed by: HOSPITALIST

## 2019-11-03 PROCEDURE — 83036 HEMOGLOBIN GLYCOSYLATED A1C: CPT

## 2019-11-03 PROCEDURE — 85025 COMPLETE CBC W/AUTO DIFF WBC: CPT

## 2019-11-03 PROCEDURE — 84100 ASSAY OF PHOSPHORUS: CPT

## 2019-11-03 PROCEDURE — 80048 BASIC METABOLIC PNL TOTAL CA: CPT

## 2019-11-03 PROCEDURE — 83690 ASSAY OF LIPASE: CPT

## 2019-11-03 RX ORDER — GABAPENTIN 300 MG/1
300 CAPSULE ORAL 3 TIMES DAILY
Status: DISCONTINUED | OUTPATIENT
Start: 2019-11-03 | End: 2019-11-04 | Stop reason: HOSPADM

## 2019-11-03 RX ORDER — DEXTROSE, SODIUM CHLORIDE, AND POTASSIUM CHLORIDE 5; .45; .3 G/100ML; G/100ML; G/100ML
INJECTION INTRAVENOUS CONTINUOUS
Status: DISCONTINUED | OUTPATIENT
Start: 2019-11-03 | End: 2019-11-04

## 2019-11-03 RX ORDER — METOCLOPRAMIDE HYDROCHLORIDE 5 MG/ML
5 INJECTION INTRAMUSCULAR; INTRAVENOUS
Status: DISCONTINUED | OUTPATIENT
Start: 2019-11-03 | End: 2019-11-04 | Stop reason: HOSPADM

## 2019-11-03 RX ADMIN — HEPARIN SODIUM 5000 UNITS: 5000 INJECTION INTRAVENOUS; SUBCUTANEOUS at 20:57

## 2019-11-03 RX ADMIN — CEPHALEXIN 500 MG: 250 CAPSULE ORAL at 08:48

## 2019-11-03 RX ADMIN — Medication 10 ML: at 06:28

## 2019-11-03 RX ADMIN — CEPHALEXIN 500 MG: 250 CAPSULE ORAL at 16:43

## 2019-11-03 RX ADMIN — ONDANSETRON 4 MG: 2 INJECTION INTRAMUSCULAR; INTRAVENOUS at 14:27

## 2019-11-03 RX ADMIN — DEXTROSE MONOHYDRATE, SODIUM CHLORIDE, AND POTASSIUM CHLORIDE: 50; 4.5; 2.98 INJECTION, SOLUTION INTRAVENOUS at 15:31

## 2019-11-03 RX ADMIN — GABAPENTIN 300 MG: 300 CAPSULE ORAL at 10:11

## 2019-11-03 RX ADMIN — CEPHALEXIN 500 MG: 250 CAPSULE ORAL at 21:48

## 2019-11-03 RX ADMIN — METOCLOPRAMIDE 5 MG: 5 INJECTION, SOLUTION INTRAMUSCULAR; INTRAVENOUS at 20:57

## 2019-11-03 RX ADMIN — ACETAMINOPHEN 650 MG: 325 TABLET ORAL at 08:49

## 2019-11-03 RX ADMIN — METOCLOPRAMIDE 5 MG: 5 INJECTION, SOLUTION INTRAMUSCULAR; INTRAVENOUS at 02:01

## 2019-11-03 RX ADMIN — Medication 10 ML: at 13:25

## 2019-11-03 RX ADMIN — Medication 10 ML: at 23:08

## 2019-11-03 RX ADMIN — DEXTROSE MONOHYDRATE, SODIUM CHLORIDE, AND POTASSIUM CHLORIDE: 50; 4.5; 2.98 INJECTION, SOLUTION INTRAVENOUS at 01:30

## 2019-11-03 RX ADMIN — HEPARIN SODIUM 5000 UNITS: 5000 INJECTION INTRAVENOUS; SUBCUTANEOUS at 13:23

## 2019-11-03 RX ADMIN — DEXTROSE MONOHYDRATE, SODIUM CHLORIDE, AND POTASSIUM CHLORIDE: 50; 4.5; 2.98 INJECTION, SOLUTION INTRAVENOUS at 08:53

## 2019-11-03 RX ADMIN — HEPARIN SODIUM 5000 UNITS: 5000 INJECTION INTRAVENOUS; SUBCUTANEOUS at 06:28

## 2019-11-03 RX ADMIN — GABAPENTIN 300 MG: 300 CAPSULE ORAL at 16:43

## 2019-11-03 RX ADMIN — DEXTROSE MONOHYDRATE, SODIUM CHLORIDE, AND POTASSIUM CHLORIDE: 50; 4.5; 2.98 INJECTION, SOLUTION INTRAVENOUS at 21:49

## 2019-11-03 RX ADMIN — TAMSULOSIN HYDROCHLORIDE 0.4 MG: 0.4 CAPSULE ORAL at 08:48

## 2019-11-03 RX ADMIN — ACETAMINOPHEN 650 MG: 325 TABLET ORAL at 15:36

## 2019-11-03 RX ADMIN — GABAPENTIN 300 MG: 300 CAPSULE ORAL at 21:48

## 2019-11-03 NOTE — PROGRESS NOTES
Problem: Falls - Risk of  Goal: *Absence of Falls  Description  Document Cleve Lowry Fall Risk and appropriate interventions in the flowsheet.   Outcome: Progressing Towards Goal  Note:   Fall Risk Interventions:  Mobility Interventions: Bed/chair exit alarm, Patient to call before getting OOB         Medication Interventions: Bed/chair exit alarm, Patient to call before getting OOB    Elimination Interventions: Call light in reach, Bed/chair exit alarm

## 2019-11-03 NOTE — PROGRESS NOTES
0700: Bedside shift change report given to Yanet Carroll (oncoming nurse) by Eayl Benz (offgoing nurse). Report included the following information SBAR, Kardex, Intake/Output and MAR     0700: Patient in bed, resting quietly. Call bell in reach, will monitor. 0945: Dr. Gutierrez Garcia at bedside. Diet order placed and possible d/c this afternoon. Will contact Dr. Gutierrez Garcia as soon results of anion gap are back. 1200: Anion gap was 16. Dr. Gutierrez Garcia made aware and will not d/c today. Will continue insulin drip and Q4 BNP.     1425: Patient feeling nauseous. PRN dose of Zofran given    1525: Patient having mild pain under rib cage. PRN tylenol given    1525: Patient complaining of a knot under rib cage that is sore when he swallows. Tylenol given and he wants to talk to Dr. Gutierrez Garcia about it in the morning     1528: Patient's nausea has resolved     1700: Patient has a diet order now, but still only taking one or two bites of his meals. Will not do carb coverage because patient is not eating     1900: Bedside shift change report given to Anastacio Alfredo (oncoming nurse) by Yanet Carroll (offgoing nurse). Report included the following information SBAR, Kardex, Intake/Output and MAR.

## 2019-11-03 NOTE — PROGRESS NOTES
Reason for Admission:  Abdominal pain, n/v                   RRAT Score: 13                 Do you (patient/family) have any concerns for transition/discharge? Prior to admission the patient was living with his significant other in her apt. He reports that he will be staying with a friend upon d/c. The patient has a 11 y/o son and a 13 month old daughter. His mother and father are supportive. He has 2 sisters but he does not speak to them. Plan for utilizing home health: N/A, the patient is completely independent in his ADLs and IADLs      Current Advanced Directive/Advance Care Plan: The patient is a full code and he does not have a MPOA/AD on-file            Transition of Care Plan:      CM met with the patient at bedside to discuss dispo plan. The patient is completely independent in his ADLs and IADLs. He uses no assistive deice when ambulating. He is able to drive and his significant other will assist with d/c transportation. The patient is currently uninsured. He reports that he applied for the Care Card a few months ago and was told in the ED that the application was still pending. He applied for Medicaid over this past year but was told his income is too high. The patient is employed full-time as a  and his company does not offer benefits. He uses the [employer] in [facility] for his medications. He reports that he and his mother use the same insulin so he gets his insulin from her. CM will continue to follow the patient for dispo needs. Care Management Interventions  PCP Verified by CM: No(The patient does not have a PCP)  Mode of Transport at Discharge:  Other (see comment)(The patient's significant other will assist with d/c transportation )  Transition of Care Consult (CM Consult): Discharge Planning  MyChart Signup: No  Discharge Durable Medical Equipment: No  Physical Therapy Consult: No  Occupational Therapy Consult: No  Speech Therapy Consult: No  Current Support Network:  Other(The patient was living with his significant other in her apt )  Confirm Follow Up Transport: Self  Plan discussed with Pt/Family/Caregiver: Yes  Freedom of Choice Offered: Yes   Resource Information Provided?: No  Discharge Location  Discharge Placement: Home    Helene yates LCSW

## 2019-11-03 NOTE — PROGRESS NOTES
Hospitalist Progress Note    NAME: Gordon Johns   :  1980   MRN:  [id number]       Assessment / Plan:  Diabetic ketoacidosis  Abdominal pain, N/V due to DKA  -Gap had closed but recurred on subsequent BMP  -continue insulin gtt and dextrose enriched IVFs  -continue q4h BMPs  -Patient on Lantus 42 units at home and SSI  -diet advanced    History of recent admission for severe right-sided hydronephrosis with massive distention of the bladder  -During last admission, patient was recommended by urology to continue Beard, take Flomax and follow-up on outpatient basis  -outpatient urology follow-up appointment but this is tomorrow at [facility].  -monitor    Neuropathy:  -start gabapentin    Code Status: Full code  Surrogate Decision Maker: Khadijah     DVT Prophylaxis: Heparin  GI Prophylaxis: not indicated     Baseline: From home independent     Subjective:     Chief Complaint / Reason for Physician Visit: follow-up DKA  Patient reports that he thinks his insulin at home was not any good because he reports taking his Lantus 42 units  He is complaining of Neuropathy pain in his feet    Review of Systems:  Symptom Y/N Comments  Symptom Y/N Comments   Fever/Chills n   Chest Pain n    Poor Appetite    Edema     Cough    Abdominal Pain n    Sputum    Joint Pain     SOB/JOHNSTON n   Pruritis/Rash     Nausea/vomit y Nausea, but thirsty  Tolerating PT/OT     Diarrhea    Tolerating Diet     Constipation    Other       Could NOT obtain due to:      Objective:     VITALS:   Last 24hrs VS reviewed since prior progress note.  Most recent are:  Patient Vitals for the past 24 hrs:   Temp Pulse Resp BP SpO2   19 1121 98 °F (36.7 °C) 100 18 148/87 99 %   19 0800 -- 92 -- -- --   19 0719 97.4 °F (36.3 °C) 94 18 132/81 99 %   19 0350 97.8 °F (36.6 °C) 97 -- 143/89 100 %   19 2325 98 °F (36.7 °C) (!) 110 -- 125/82 99 %   19 2225 97.9 °F (36.6 °C) (!) 103 22 (!) 152/93 100 % 11/02/19 2145 -- 97 -- 126/83 99 %   11/02/19 2130 -- -- -- 138/88 99 %   11/02/19 2122 -- -- -- -- 99 %   11/02/19 2121 -- -- -- (!) 161/93 --   11/02/19 2115 -- -- -- (!) 144/93 --   11/02/19 1917 97.6 °F (36.4 °C) (!) 114 17 128/79 100 %       Intake/Output Summary (Last 24 hours) at 11/3/2019 1213  Last data filed at 11/3/2019 0855  Gross per 24 hour   Intake 1146.03 ml   Output 1050 ml   Net 96.03 ml        PHYSICAL EXAM:  General: WD, WN. Alert, cooperative, no acute distress    EENT:  EOMI. Anicteric sclerae. MMM  Resp:  CTA bilaterally, no wheezing or rales. No accessory muscle use  CV:  Regular  rhythm,  No edema  GI:  Soft, Non distended, Non tender.  +Bowel sounds  Neurologic:  Alert and oriented X 3, normal speech,   Psych:   Fair insight. Not anxious nor agitated  Skin:  No rashes. No jaundice    Reviewed most current lab test results and cultures  YES  Reviewed most current radiology test results   YES  Review and summation of old records today    NO  Reviewed patient's current orders and MAR    YES  PMH/SH reviewed - no change compared to H&P  ________________________________________________________________________  Care Plan discussed with:    Comments   Patient x    Family      RN x    Care Manager     Consultant                        Multidiciplinary team rounds were held today with , nursing, pharmacist and clinical coordinator. Patient's plan of care was discussed; medications were reviewed and discharge planning was addressed.      ________________________________________________________________________  Total NON critical care TIME:  30   Minutes    Total CRITICAL CARE TIME Spent:   Minutes non procedure based      Comments   >50% of visit spent in counseling and coordination of care     ________________________________________________________________________  Jessica Rayo MD     Procedures: see electronic medical records for all procedures/Xrays and details which were not copied into this note but were reviewed prior to creation of Plan. LABS:  I reviewed today's most current labs and imaging studies.   Pertinent labs include:  Recent Labs     11/03/19  0806 11/02/19  1950 11/01/19  0436   WBC 12.4* 10.1 9.6   HGB 14.6 14.8 13.9   HCT 40.8 42.8 39.8    372 293     Recent Labs     11/03/19  1104 11/03/19  0320 11/03/19  0034 11/02/19 1950   * 135* 136 128*   K 4.3 4.2 3.9 4.0    103 104 94*   CO2 17* 19* 13* 8*   * 165* 190* 493*   BUN 17 19 19 22*   CREA 0.99 0.96 0.98 0.96   CA 8.5 8.3* 8.5 8.4*   MG 1.9 2.0 2.1  --    PHOS  --   --  2.4*  --    ALB  --   --   --  2.7*   TBILI  --   --   --  0.4   SGOT  --   --   --  29   ALT  --   --   --  52       Signed: Brionna Bustos MD

## 2019-11-03 NOTE — PROGRESS NOTES
Problem: Falls - Risk of  Goal: *Absence of Falls  Description  Document Marlena Herrera Fall Risk and appropriate interventions in the flowsheet.   Outcome: Progressing Towards Goal  Note:   Fall Risk Interventions:  Mobility Interventions: Bed/chair exit alarm, Patient to call before getting OOB         Medication Interventions: Bed/chair exit alarm, Patient to call before getting OOB    Elimination Interventions: Call light in reach, Bed/chair exit alarm

## 2019-11-03 NOTE — ED NOTES
Pt transferred to PCU. Insulin drip started. Receiving RN aware of last BGL and when to complete 1hr check. Pt vitals stable at this time.

## 2019-11-03 NOTE — H&P
Hospitalist Admission Note    NAME: Karla Henry   :  1980   MRN:  [id number]     Date/Time:  2019 9:18 PM    Patient PCP: Yahir Varghese NP  ________________________________________________________________________    My assessment of this patient's clinical condition and my plan of care is as follows. Assessment / Plan:  Diabetic ketoacidosis  Abdominal pain,N/V due to DKA  -Labs consistent with DKA. Patient reports taking his insulin as prescribed. -Follow DKA protocol with IV insulin, IV fluids, serial BMPs, serial electrolytes  -Once blood sugars are less than 250, change IV fluids to D5 normal saline.  -Once anion gap closes on 2 successive BMPs, change to subcutaneous insulin  -Consult to diabetic nurse  -Hemoglobin A1c ordered  -Keep n.p.o. except for ice chips  -if abdominal persists after resolution of DKA, consider repeating ct of abdomen. Lft's are normal now. Check lipase in am.    History of recent admission for severe right-sided hydronephrosis with massive distention of the bladder  -During last admission, patient was recommended by urology to continue Beard, take Flomax and follow-up on outpatient basis  -He is asking for a bigger Beard bag and will ask RN to provide a larger Beard bag  -I reinforced him to keep up with his outpatient urology follow-up appointment    Pseudohyponatremia  -Likely related to hyperglycemia. Check BMP    I have provided  55     minutes of critical care time. During this entire length of time I was immediately available to the patient.       The reason for providing this level of medical care was due to a critical illness that impaired one or more vital organ systems, such that there was a high probability of imminent or life threatening deterioration in the patient's condition.  This care involved high complexity decision making which includes reviewing the patient's past medical records, current laboratory results, and actual Xray films in order to assess, support vital system function, and to treat this degree of vital organ system failure, and to prevent further life threatening deterioration of the patients condition. Code Status: Full code  Surrogate Decision Maker: Khadijah    DVT Prophylaxis: Heparin  GI Prophylaxis: not indicated    Baseline: From home independent        Subjective:   CHIEF COMPLAINT: Abdominal pain, nausea and vomiting    HISTORY OF PRESENT ILLNESS:     Faiza Marx is a 41 y.o. male who presents with past medical history of diabetes, recent admission for hydronephrosis is coming to the hospital with chief complaint of abdominal pain, nausea and vomiting. Patient reports that he felt better only for a few hours after going home and subsequently started having nausea, vomiting along with abdominal pain. He reports that he was not able to keep even water down. He reports that he took his insulin as prescribed. He reports abdominal pain is about 3 x 10, generalized, nonradiating, without any aggravating or relieving factors. He does not report any chest pain or shortness of breath. He does not report any dysuria or urgency. He does not have any chest pain or shortness of breath. Denies fever or chills. On arrival to the hospital, he had a BMP done which revealed evidence of diabetic ketoacidosis for which she was started on IV insulin drip. We were asked to admit for work up and evaluation of the above problems.      Past Medical History:   Diagnosis Date    Bipolar 1 disorder, depressed ([facility]     [facility]     Depression     Diabetes [facility]     DKA, type 1 ([facility] 1/27/2013    diagnosed age 23    H/O noncompliance with medical treatment, presenting hazards to health     MRSA (methicillin resistant staph aureus) culture positive     MRSA (methicillin resistant Staphylococcus aureus)     Face    Noncompliance with medication regimen     Second hand smoke exposure     Seizure ([facility]     Seizures ([facility] 2006 or 2007    one episode during [facility]        Past Surgical History:   Procedure Laterality Date    HX HEENT      top left wisdom tooth    HX ORTHOPAEDIC Left     wrist; [facility]    UPPER GI ENDOSCOPY,BIOPSY  11/20/2018            Social History     Tobacco Use    Smoking status: Current Some Day Smoker     Packs/day: 0.10     Years: 16.00     Pack years: 1.60     Types: Cigarettes    Smokeless tobacco: Never Used   Substance Use Topics    Alcohol use: No        Family History   Problem Relation Age of Onset    Diabetes Mother     Diabetes Other         neice, type 1      No Known Allergies     Prior to Admission medications    Medication Sig Start Date End Date Taking? Authorizing Provider   cephALEXin (KEFLEX) 500 mg capsule Take 1 Cap by mouth three (3) times daily for 5 days. Indications: Bacterial Urinary Tract Infection 11/1/19 11/6/19 Yes Katt Patel MD   tamsulosin (FLOMAX) 0.4 mg capsule Take 1 Cap by mouth daily. 11/1/19  Yes Katt Patel MD   insulin glargine (LANTUS U-100 INSULIN) 100 unit/mL injection 42 Units by SubCUTAneous route daily. Yes Provider, Historical   insulin regular (NOVOLIN R) 100 unit/mL injection 2-10 Units by SubCUTAneous route Before breakfast, lunch, and dinner. Blood Glucose (mg/dL)       Insulin Dose (units)              150-200                               2               201-250                               4               251-300                               6               301-350                               8               >351                                   10   Yes Provider, Historical       REVIEW OF SYSTEMS:     I am not able to complete the review of systems because:    The patient is intubated and sedated    The patient has altered mental status due to his acute medical problems    The patient has baseline aphasia from prior stroke(s)    The patient has baseline dementia and is not reliable historian    The patient is in acute medical distress and unable to provide information           Total of 12 systems reviewed as follows:       POSITIVE= underlined text  Negative = text not underlined  General:  fever, chills, sweats, generalized weakness, weight loss/gain,      loss of appetite   Eyes:    blurred vision, eye pain, loss of vision, double vision  ENT:    rhinorrhea, pharyngitis   Respiratory:   cough, sputum production, SOB, JOHNSTON, wheezing, pleuritic pain   Cardiology:   chest pain, palpitations, orthopnea, PND, edema, syncope   Gastrointestinal:  abdominal pain , N/V, diarrhea, dysphagia, constipation, bleeding   Genitourinary:  frequency, urgency, dysuria, hematuria, incontinence   Muskuloskeletal :  arthralgia, myalgia, back pain  Hematology:  easy bruising, nose or gum bleeding, lymphadenopathy   Dermatological: rash, ulceration, pruritis, color change / jaundice  Endocrine:   hot flashes or polydipsia   Neurological:  headache, dizziness, confusion, focal weakness, paresthesia,     Speech difficulties, memory loss, gait difficulty  Psychological: Feelings of anxiety, depression, agitation    Objective:   VITALS:    Visit Vitals  /79 (BP 1 Location: Left arm, BP Patient Position: Sitting)   Pulse (!) 114   Temp 97.6 °F (36.4 °C)   Resp 17   Ht 5' 9\" (1.753 m)   Wt 61.2 kg (135 lb)   SpO2 100%   BMI 19.94 kg/m²       PHYSICAL EXAM:    General:    Alert, cooperative, appears stated age uncomfortable. HEENT: Atraumatic, anicteric sclerae, pink conjunctivae     No oral ulcers, mucosa moist  Neck:  Supple, symmetrical,  thyroid: non tender  Lungs:   Clear to auscultation bilaterally. No Wheezing or Rhonchi. No rales. Chest wall:  No tenderness  No Accessory muscle use. Heart:   Regular  rhythm,  No  murmur   No edema  Abdomen:   Soft, mild generalized tenderness, no guarding, no rigidity, Beard present  Extremities: No cyanosis.   No clubbing,      Skin turgor normal, Capillary refill normal, Radial dial pulse 2+  Skin:     Not pale. Not Jaundiced  No rashes   Psych:  Not anxious or agitated. Neurologic: EOMs intact. No facial asymmetry. No aphasia or slurred speech. Symmetrical strength, Sensation grossly intact. Alert and oriented X 4.     _______________________________________________________________________  Care Plan discussed with:    Comments   Patient y    Family      RN y    Care Manager                    Consultant:      _______________________________________________________________________  Expected  Disposition:   Home with Family y   HH/PT/OT/RN    SNF/LTC    CATHIE    ________________________________________________________________________  TOTAL TIME:  61  Minutes    Critical Care Provided     Minutes non procedure based      Comments    y Reviewed previous records   >50% of visit spent in counseling and coordination of care y Discussion with patient and/or family and questions answered       ________________________________________________________________________  Signed: Shaheed Stevenson MD    Procedures: see electronic medical records for all procedures/Xrays and details which were not copied into this note but were reviewed prior to creation of Plan.     LAB DATA REVIEWED:    Recent Results (from the past 24 hour(s))   GLUCOSE, POC    Collection Time: 11/02/19  7:15 PM   Result Value Ref Range    Glucose (POC) 468 (H) 65 - 100 mg/dL    Performed by Bandar Robles RN    GLUCOSE, POC    Collection Time: 11/02/19  7:37 PM   Result Value Ref Range    Glucose (POC) 522 (H) 65 - 100 mg/dL    Performed by Marce Shone RN (traveler)    CBC WITH AUTOMATED DIFF    Collection Time: 11/02/19  7:50 PM   Result Value Ref Range    WBC 10.1 4.1 - 11.1 K/uL    RBC 4.66 4.10 - 5.70 M/uL    HGB 14.8 12.1 - 17.0 g/dL    HCT 42.8 36.6 - 50.3 %    MCV 91.8 80.0 - 99.0 FL    MCH 31.8 26.0 - 34.0 PG    MCHC 34.6 30.0 - 36.5 g/dL    RDW 11.9 11.5 - 14.5 %    PLATELET 512 873 - 912 K/uL    MPV 10.6 8.9 - 12.9 FL    NRBC 0.0 0 PER 100 WBC    ABSOLUTE NRBC 0.00 0.00 - 0.01 K/uL    NEUTROPHILS 83 (H) 32 - 75 %    LYMPHOCYTES 14 12 - 49 %    MONOCYTES 1 (L) 5 - 13 %    EOSINOPHILS 1 0 - 7 %    BASOPHILS 0 0 - 1 %    IMMATURE GRANULOCYTES 1 (H) 0.0 - 0.5 %    ABS. NEUTROPHILS 8.4 (H) 1.8 - 8.0 K/UL    ABS. LYMPHOCYTES 1.4 0.8 - 3.5 K/UL    ABS. MONOCYTES 0.1 0.0 - 1.0 K/UL    ABS. EOSINOPHILS 0.1 0.0 - 0.4 K/UL    ABS. BASOPHILS 0.0 0.0 - 0.1 K/UL    ABS. IMM. GRANS. 0.1 (H) 0.00 - 0.04 K/UL    DF AUTOMATED     METABOLIC PANEL, COMPREHENSIVE    Collection Time: 11/02/19  7:50 PM   Result Value Ref Range    Sodium 128 (L) 136 - 145 mmol/L    Potassium 4.0 3.5 - 5.1 mmol/L    Chloride 94 (L) 97 - 108 mmol/L    CO2 8 (LL) 21 - 32 mmol/L    Anion gap 26 (H) 5 - 15 mmol/L    Glucose 493 (H) 65 - 100 mg/dL    BUN 22 (H) 6 - 20 MG/DL    Creatinine 0.96 0.70 - 1.30 MG/DL    BUN/Creatinine ratio 23 (H) 12 - 20      GFR est AA >60 >60 ml/min/1.73m2    GFR est non-AA >60 >60 ml/min/1.73m2    Calcium 8.4 (L) 8.5 - 10.1 MG/DL    Bilirubin, total 0.4 0.2 - 1.0 MG/DL    ALT (SGPT) 52 12 - 78 U/L    AST (SGOT) 29 15 - 37 U/L    Alk.  phosphatase 210 (H) 45 - 117 U/L    Protein, total 6.1 (L) 6.4 - 8.2 g/dL    Albumin 2.7 (L) 3.5 - 5.0 g/dL    Globulin 3.4 2.0 - 4.0 g/dL    A-G Ratio 0.8 (L) 1.1 - 2.2     URINALYSIS W/ REFLEX CULTURE    Collection Time: 11/02/19  7:50 PM   Result Value Ref Range    Color YELLOW/STRAW      Appearance CLEAR CLEAR      Specific gravity 1.030 1.003 - 1.030      pH (UA) 5.0 5.0 - 8.0      Protein 30 (A) NEG mg/dL    Glucose >1,000 (A) NEG mg/dL    Ketone >80 (A) NEG mg/dL    Bilirubin NEGATIVE  NEG      Blood LARGE (A) NEG      Urobilinogen 0.2 0.2 - 1.0 EU/dL    Nitrites NEGATIVE  NEG      Leukocyte Esterase NEGATIVE  NEG      UA:UC IF INDICATED CULTURE NOT INDICATED BY UA RESULT CNI      WBC 0-4 0 - 4 /hpf    RBC 10-20 0 - 5 /hpf    Epithelial cells FEW FEW /lpf    Bacteria NEGATIVE  NEG /hpf    Hyaline cast 2-5 0 - 5 /lpf   GLUCOSE, POC    Collection Time: 11/02/19  8:07 PM   Result Value Ref Range    Glucose (POC) 472 (H) 65 - 100 mg/dL    Performed by Maryjane Schroeder RN (traveler)    POC VENOUS BLOOD GAS    Collection Time: 11/02/19  8:50 PM   Result Value Ref Range    Device: ROOM AIR      FIO2 (POC) 21 %    pH, venous (POC) 7.187 (LL) 7.32 - 7.42      pCO2, venous (POC) 22.0 (L) 41 - 51 MMHG    pO2, venous (POC) 42 (H) 25 - 40 mmHg    HCO3, venous (POC) 8.4 (L) 23.0 - 28.0 MMOL/L    sO2, venous (POC) 67 65 - 88 %    Base deficit, venous (POC) 20 mmol/L    Allens test (POC) N/A      Total resp.  rate 18      Site OTHER      Specimen type (POC) VENOUS BLOOD     GLUCOSE, POC    Collection Time: 11/02/19  8:51 PM   Result Value Ref Range    Glucose (POC) 386 (H) 65 - 100 mg/dL    Performed by Maryjane Schroeder RN (traveler)

## 2019-11-04 VITALS
HEART RATE: 92 BPM | HEIGHT: 69 IN | TEMPERATURE: 98.4 F | RESPIRATION RATE: 18 BRPM | SYSTOLIC BLOOD PRESSURE: 168 MMHG | DIASTOLIC BLOOD PRESSURE: 97 MMHG | BODY MASS INDEX: 19.99 KG/M2 | OXYGEN SATURATION: 99 % | WEIGHT: 135 LBS

## 2019-11-04 LAB
ADMINISTERED INITIALS, ADMINIT: NORMAL
ANION GAP SERPL CALC-SCNC: 10 MMOL/L (ref 5–15)
BACTERIA SPEC CULT: NORMAL
BUN SERPL-MCNC: 9 MG/DL (ref 6–20)
BUN/CREAT SERPL: 11 (ref 12–20)
CALCIUM SERPL-MCNC: 8.1 MG/DL (ref 8.5–10.1)
CHLORIDE SERPL-SCNC: 103 MMOL/L (ref 97–108)
CO2 SERPL-SCNC: 22 MMOL/L (ref 21–32)
CREAT SERPL-MCNC: 0.83 MG/DL (ref 0.7–1.3)
D50 ADMINISTERED, D50ADM: 0 ML
D50 ORDER, D50ORD: 0 ML
GLSCOM COMMENTS: NORMAL
GLUCOSE BLD STRIP.AUTO-MCNC: 134 MG/DL (ref 65–100)
GLUCOSE BLD STRIP.AUTO-MCNC: 143 MG/DL (ref 65–100)
GLUCOSE BLD STRIP.AUTO-MCNC: 183 MG/DL (ref 65–100)
GLUCOSE BLD STRIP.AUTO-MCNC: 223 MG/DL (ref 65–100)
GLUCOSE BLD STRIP.AUTO-MCNC: 244 MG/DL (ref 65–100)
GLUCOSE BLD STRIP.AUTO-MCNC: 246 MG/DL (ref 65–100)
GLUCOSE BLD STRIP.AUTO-MCNC: 264 MG/DL (ref 65–100)
GLUCOSE SERPL-MCNC: 257 MG/DL (ref 65–100)
GLUCOSE, GLC: 134 MG/DL
GLUCOSE, GLC: 143 MG/DL
GLUCOSE, GLC: 183 MG/DL
GLUCOSE, GLC: 223 MG/DL
GLUCOSE, GLC: 246 MG/DL
GLUCOSE, GLC: 264 MG/DL
HIGH TARGET, HITG: 250 MG/DL
INSULIN ADMINSTERED, INSADM: 0 UNITS/HOUR
INSULIN ADMINSTERED, INSADM: 0.1 UNITS/HOUR
INSULIN ADMINSTERED, INSADM: 0.8 UNITS/HOUR
INSULIN ADMINSTERED, INSADM: 1.2 UNITS/HOUR
INSULIN ADMINSTERED, INSADM: 1.9 UNITS/HOUR
INSULIN ADMINSTERED, INSADM: 2 UNITS/HOUR
INSULIN ORDER, INSORD: 0 UNITS/HOUR
INSULIN ORDER, INSORD: 0.1 UNITS/HOUR
INSULIN ORDER, INSORD: 0.8 UNITS/HOUR
INSULIN ORDER, INSORD: 1.2 UNITS/HOUR
INSULIN ORDER, INSORD: 1.9 UNITS/HOUR
INSULIN ORDER, INSORD: 2 UNITS/HOUR
LOW TARGET, LOT: 150 MG/DL
MAGNESIUM SERPL-MCNC: 1.9 MG/DL (ref 1.6–2.4)
MINUTES UNTIL NEXT BG, NBG: 60 MIN
MULTIPLIER, MUL: 0
MULTIPLIER, MUL: 0
MULTIPLIER, MUL: 0.01
ORDER INITIALS, ORDINIT: NORMAL
POTASSIUM SERPL-SCNC: 4.4 MMOL/L (ref 3.5–5.1)
SERVICE CMNT-IMP: ABNORMAL
SERVICE CMNT-IMP: NORMAL
SODIUM SERPL-SCNC: 135 MMOL/L (ref 136–145)

## 2019-11-04 PROCEDURE — 74011250636 HC RX REV CODE- 250/636: Performed by: INTERNAL MEDICINE

## 2019-11-04 PROCEDURE — 74011250636 HC RX REV CODE- 250/636: Performed by: HOSPITALIST

## 2019-11-04 PROCEDURE — 74011250637 HC RX REV CODE- 250/637: Performed by: INTERNAL MEDICINE

## 2019-11-04 PROCEDURE — 74011636637 HC RX REV CODE- 636/637: Performed by: INTERNAL MEDICINE

## 2019-11-04 PROCEDURE — 74011000258 HC RX REV CODE- 258: Performed by: INTERNAL MEDICINE

## 2019-11-04 PROCEDURE — 83735 ASSAY OF MAGNESIUM: CPT

## 2019-11-04 PROCEDURE — 82962 GLUCOSE BLOOD TEST: CPT

## 2019-11-04 PROCEDURE — 36415 COLL VENOUS BLD VENIPUNCTURE: CPT

## 2019-11-04 PROCEDURE — 80048 BASIC METABOLIC PNL TOTAL CA: CPT

## 2019-11-04 RX ORDER — FLUCONAZOLE 100 MG/1
100 TABLET ORAL DAILY
Qty: 10 TAB | Refills: 0 | Status: SHIPPED | OUTPATIENT
Start: 2019-11-04 | End: 2019-11-14

## 2019-11-04 RX ORDER — INSULIN LISPRO 100 [IU]/ML
7 INJECTION, SOLUTION INTRAVENOUS; SUBCUTANEOUS ONCE
Status: COMPLETED | OUTPATIENT
Start: 2019-11-04 | End: 2019-11-04

## 2019-11-04 RX ORDER — PANTOPRAZOLE SODIUM 40 MG/1
40 TABLET, DELAYED RELEASE ORAL
Status: DISCONTINUED | OUTPATIENT
Start: 2019-11-04 | End: 2019-11-04 | Stop reason: HOSPADM

## 2019-11-04 RX ORDER — FLUCONAZOLE 100 MG/1
200 TABLET ORAL
Status: COMPLETED | OUTPATIENT
Start: 2019-11-04 | End: 2019-11-04

## 2019-11-04 RX ORDER — MAGNESIUM SULFATE 100 %
4 CRYSTALS MISCELLANEOUS AS NEEDED
Status: DISCONTINUED | OUTPATIENT
Start: 2019-11-04 | End: 2019-11-04 | Stop reason: HOSPADM

## 2019-11-04 RX ORDER — INSULIN GLARGINE 100 [IU]/ML
INJECTION, SOLUTION SUBCUTANEOUS
Qty: 6 ADJUSTABLE DOSE PRE-FILLED PEN SYRINGE | Refills: 0 | Status: SHIPPED | OUTPATIENT
Start: 2019-11-04 | End: 2020-01-05

## 2019-11-04 RX ORDER — INSULIN GLARGINE 100 [IU]/ML
42 INJECTION, SOLUTION SUBCUTANEOUS DAILY
Status: DISCONTINUED | OUTPATIENT
Start: 2019-11-04 | End: 2019-11-04 | Stop reason: HOSPADM

## 2019-11-04 RX ORDER — DEXTROSE 50 % IN WATER (D50W) INTRAVENOUS SYRINGE
12.5-25 AS NEEDED
Status: DISCONTINUED | OUTPATIENT
Start: 2019-11-04 | End: 2019-11-04 | Stop reason: HOSPADM

## 2019-11-04 RX ORDER — INSULIN LISPRO 100 [IU]/ML
INJECTION, SOLUTION INTRAVENOUS; SUBCUTANEOUS
Status: DISCONTINUED | OUTPATIENT
Start: 2019-11-04 | End: 2019-11-04 | Stop reason: HOSPADM

## 2019-11-04 RX ORDER — FLUCONAZOLE 100 MG/1
100 TABLET ORAL DAILY
Qty: 10 TAB | Refills: 0 | Status: SHIPPED | OUTPATIENT
Start: 2019-11-04 | End: 2019-11-04 | Stop reason: SDUPTHER

## 2019-11-04 RX ORDER — PANTOPRAZOLE SODIUM 40 MG/1
40 TABLET, DELAYED RELEASE ORAL
Qty: 30 TAB | Refills: 0 | Status: SHIPPED | OUTPATIENT
Start: 2019-11-05 | End: 2020-01-02

## 2019-11-04 RX ORDER — GABAPENTIN 300 MG/1
300 CAPSULE ORAL 3 TIMES DAILY
Qty: 90 CAP | Refills: 0 | Status: SHIPPED | OUTPATIENT
Start: 2019-11-04 | End: 2020-01-02

## 2019-11-04 RX ADMIN — TAMSULOSIN HYDROCHLORIDE 0.4 MG: 0.4 CAPSULE ORAL at 08:17

## 2019-11-04 RX ADMIN — DEXTROSE MONOHYDRATE, SODIUM CHLORIDE, AND POTASSIUM CHLORIDE: 50; 4.5; 2.98 INJECTION, SOLUTION INTRAVENOUS at 04:53

## 2019-11-04 RX ADMIN — PANTOPRAZOLE SODIUM 40 MG: 40 TABLET, DELAYED RELEASE ORAL at 10:05

## 2019-11-04 RX ADMIN — INSULIN GLARGINE 42 UNITS: 100 INJECTION, SOLUTION SUBCUTANEOUS at 08:17

## 2019-11-04 RX ADMIN — INSULIN LISPRO 7 UNITS: 100 INJECTION, SOLUTION INTRAVENOUS; SUBCUTANEOUS at 12:03

## 2019-11-04 RX ADMIN — METOCLOPRAMIDE 5 MG: 5 INJECTION, SOLUTION INTRAMUSCULAR; INTRAVENOUS at 05:01

## 2019-11-04 RX ADMIN — INSULIN LISPRO 3 UNITS: 100 INJECTION, SOLUTION INTRAVENOUS; SUBCUTANEOUS at 12:03

## 2019-11-04 RX ADMIN — CEPHALEXIN 500 MG: 250 CAPSULE ORAL at 08:17

## 2019-11-04 RX ADMIN — SODIUM CHLORIDE 1.9 UNITS/HR: 900 INJECTION, SOLUTION INTRAVENOUS at 08:13

## 2019-11-04 RX ADMIN — Medication 10 ML: at 14:00

## 2019-11-04 RX ADMIN — GABAPENTIN 300 MG: 300 CAPSULE ORAL at 08:17

## 2019-11-04 RX ADMIN — INSULIN LISPRO 2 UNITS: 100 INJECTION, SOLUTION INTRAVENOUS; SUBCUTANEOUS at 09:08

## 2019-11-04 RX ADMIN — Medication 10 ML: at 05:05

## 2019-11-04 RX ADMIN — HEPARIN SODIUM 5000 UNITS: 5000 INJECTION INTRAVENOUS; SUBCUTANEOUS at 05:01

## 2019-11-04 RX ADMIN — FLUCONAZOLE 200 MG: 100 TABLET ORAL at 10:05

## 2019-11-04 NOTE — DISCHARGE INSTRUCTIONS
HOSPITALIST DISCHARGE INSTRUCTIONS    NAME: Franklin Juan   :  1980   MRN:  [id number]     Date/Time:  2019 12:09 PM    ADMIT DATE: 2019     DISCHARGE DATE: 2019     DISCHARGE DIAGNOSIS:  Diabetic ketoacidosis  Abdominal pain, N/V due to DKA  Urinary Retention  Neuropathy  Possible Gastroparesis  Possible Candida Esophagitis     MEDICATIONS:  As per medication reconciliation  list  · It is important that you take the medication exactly as they are prescribed. · Keep your medication in the bottles provided by the pharmacist and keep a list of the medication names, dosages, and times to be taken in your wallet. · Do not take other medications without consulting your doctor. Pain Management: per above medications    What to do at Home    Recommended diet:  Diabetic Diet    Recommended activity: Activity as tolerated    If you experience any of the following symptoms then please call your primary care physician or return to the emergency room if you cannot get hold of your doctor:  Fever, chills, nausea, vomiting, diarrhea, change in mentation, falling, bleeding, shortness of breath or any other concerning symptoms. Follow Up:  Reschedule your Urology follow-up  With your PCP, Dr. Anne Burgess, CHARLY , within 1 week to further discuss you Diabetes and Neuropathy. Information obtained by :  I understand that if any problems occur once I am at home I am to contact my physician. I understand and acknowledge receipt of the instructions indicated above.                                                                                                                                            Physician's or R.N.'s Signature                                                                  Date/Time                                                                                                                                              Patient or Representative Signature Date/Time

## 2019-11-04 NOTE — ROUTINE PROCESS
To whom it may concern,    Mr Catracho Muller was in the hospital from 11/2/19 through 11/4/19 and should be excused from work during this time. He may return to work on 11/5/19 without restriction.          Chloe Lopes MD

## 2019-11-04 NOTE — PROGRESS NOTES
Problem: Falls - Risk of  Goal: *Absence of Falls  Description  Document John Robles Fall Risk and appropriate interventions in the flowsheet.   Outcome: Progressing Towards Goal  Note:   Fall Risk Interventions:  Mobility Interventions: Communicate number of staff needed for ambulation/transfer, Patient to call before getting OOB         Medication Interventions: Patient to call before getting OOB    Elimination Interventions: Call light in reach, Patient to call for help with toileting needs

## 2019-11-04 NOTE — PROGRESS NOTES
Bedside and Verbal shift change report given to David Hernandez, ASAD (oncoming nurse) by Fanny Dillon RN (offgoing nurse). Report included the following information SBAR, Kardex, MAR and Recent Results. 1952: Assessed patient, MEWS 1, VSS, insulin drip currently infusing, no complaints of pain at this time, no request at this time. Will continue to monitor. 2045: Messaged tele-hospitalist to see if we can d/c Q4H labs checks since patient's anion gap and MAG has been WDL. 2057: PRN Reglan given for nausea. 2308: Reassessed patient, MEWS 1, VSS, no changes from previous assessment, no complaints of pain at this time, no request at this time. Will continue to monitor. 46: Reassessed patient, MEWS 1, VSS, AM labs obtained, no changes from previous assessment, no complaints of pain at this time, no request at this time. Will continue to monitor. 0501: PRN Reglan given for nausea. 0710: Bedside and Verbal shift change report given to Chaya Milan (oncoming nurse) by David Hernandez RN (offgoing nurse). Report included the following information SBAR, Kardex, MAR and Recent Results.

## 2019-11-04 NOTE — PROGRESS NOTES
TRANSITION OF CARE PLAN:     Plan A: Home with family     CM met with pt to discuss d/c planning. Pt states that he has missed several PCP appts. CM provided pt with another care card application and suggested that he contact the number provided to check status of previously submitted application. CM also provided pt with a list of free clinics if obtaining his medications and PCP office visits were a concern. Pt verbalized understanding and states that he has no further needs at this time. CM will continue to follow patient for discharge planning needs and arrange for services as deemed necessary.     Juliette Garnica, Care Manager  [phone]

## 2019-11-04 NOTE — DIABETES MGMT
[facility]    [facility] Consult Note    Recommendations/ Comments: consider continuing insulin gtt until anion gap is less than 12 on two consecutive BMPs and BG is less than 200 mg/dl and transition per hospital guidelines. Please continue CHO coverage for meals for prandial insulin needs. Please provide pt with referral/resource for behavioral health as this is a significant barrier to DM management at this time. Pt will require basal insulin at all times , will also require prandial insulin at meals given Type 1 Dx. Consult received for:  []             Assessment of home management                []      Medication Recommendations                [x]             Meter/monitoring     [x]             Insulin instruction     []             New diagnosis     []             Outpatient education     []             Insulin pump patient     []             Insulin infusion     []             DKA/HHS    Chart reviewed and initial evaluation complete on Cora Canela. Patient is a 41 y.o. male with hx of Type 1 DM on Lantus 42 units daily, Novolin R \"sliding scale\" at home. Pt not consistently taking Lantus as he is often busy during the morning and therefore will take it at night. Often missing insulin during the day. Not checking BG  - pt not sure when the last time he checked BG. Pt shared that he feels depression has a large part in barriers to DM management. Shared that he has trouble getting appointment with therapist due to not having insurance and not having time off from work. Pt denies any support system at home. Pt did share that his addiction includes \"sweet tea\". Pt eating ramen noodles , shared that he has no where to store food - but then shared \" I can't eat what everyone else is having\". Pt shared that his insulin is likely  also because he has not been taking it regularly.        Assessed and instructed patient on the following:   ·  blood sugar goals, illness management and nutrition  · Conversation focused on NEEDING to take his insulin, discussed that regardless of what he eats, he requires insulin. STRESSED to patient that due to his Type 1 DM , focus is more on making sure he has his insulin with meals and basal insulin vs what he is eating. Stressed avoiding sugary beverages . · Discussed expiration of insulin as well  · Strongly encouraged pt to set appointment with therapist given his report of depression   · Pt did ask for help with CHO counting so I did provide this (using hospital menu, and CHO resource book)  - pt did very well with this and again educator reinforced that emphasis needs to be on taking insulin vs CHO counting at this time. Encouraged the following:   dietary modifications: discussed needing to take insulin when eating - reinforced avoiding focus on CHO counting but NEED for insulin at meals given pt has Type 1 DM, regular blood sugar monitoring: increase to before meals - stressed importance in making this priority       Provided patient with the following: [x]             Diabetes Self Care Guide               []             Insulin education materials               [x]             CHO counting education materials               [x]             Outpatient DTC contact number               []             Glucometer                   Discussed with patient and/or family need for follow up appointment for diabetes management after discharge. Pt was encouraged to set up PCP and endo appointment while care card application status is pending.       A1c:   Lab Results   Component Value Date/Time    Hemoglobin A1c 14.1 (H) 11/03/2019 03:20 AM       Recent Glucose Results:   Lab Results   Component Value Date/Time     (H) 11/04/2019 04:57 AM     (H) 11/03/2019 06:47 PM     (H) 11/03/2019 03:24 PM    GLUCPOC 246 (H) 11/04/2019 08:12 AM    GLUCPOC 183 (H) 11/04/2019 06:27 AM    GLUCPOC 264 (H) 11/04/2019 04:56 AM        Lab Results Component Value Date/Time    Creatinine 0.83 11/04/2019 04:57 AM     Estimated Creatinine Clearance: 106.5 mL/min (based on SCr of 0.83 mg/dL). Active Orders   Diet    DIET DIABETIC CONSISTENT CARB Regular        PO intake:   Patient Vitals for the past 72 hrs:   % Diet Eaten   11/03/19 1704 0 %   11/03/19 1610 5 %   11/03/19 1214 0 %   11/03/19 0855 0 %       Will continue to follow as needed. Thank you.     Rosa Katz, [address]  [facility]    Office: [phone]    Time spent: [phone]

## 2019-11-04 NOTE — PROGRESS NOTES
0700: Bedside shift change report given to Jayson Alfonso (oncoming nurse) by Jessi Virgen (offgoing nurse). Report included the following information SBAR, Kardex, Intake/Output and MAR.     0700: Patient in bed, resting quietly. Call bell in reach, will monitor. 0800: Dr. Maura Thompson made aware that patient's gap is closed X2. Lantus ordered. 8633: Lantus given and Dr. Maura Thompson instructed me to turn off drip and fluids in 1.5 hours    1000: Insulin drip and IVF stopped. 1030: Patient changed to ACHS and will add sliding scale insulin. 1200: Discharge orders placed. Patient will be discharged with hansen. CM working on d/c appointments and resources     : Patient requesting a doctor's note for work. Gisel Thompson to get one before d/c.     1640: Patient supplied with a leg bag to go home with for urinary catheter.

## 2019-11-04 NOTE — DISCHARGE SUMMARY
Hospitalist Discharge Summary     Patient ID:  Katharine Stark  [id number]  53 y.o.  1980 11/2/2019    PCP on record: Surekha Ceja NP    Admit date: 11/2/2019  Discharge date and time: 11/4/2019    DISCHARGE DIAGNOSIS:  Diabetic ketoacidosis  Abdominal pain, N/V due to DKA  Urinary Retention  Neuropathy  Possible Gastroparesis  Possible Candida Esophagitis    CONSULTATIONS:  IP CONSULT TO HOSPITALIST    See HPI from H&P of Ani Valdes MD:  ______________________________________________________________________  DISCHARGE SUMMARY/HOSPITAL COURSE:  for full details see H&P, daily progress notes, labs, consult notes. Hospital course via problem below:    Diabetic ketoacidosis  Abdominal pain, N/V due to DKA; resolved  -resolved, transitioned to Lantus this am  -patient tolerating PO prior to discharge     History of recent admission for severe right-sided hydronephrosis with massive distention of the bladder  -During last admission, patient was recommended by urology to continue Beard, take Flomax and follow-up on outpatient basis  -outpatient urology follow-up will need to be rescheduled     Pseudohyponatremia  -resolved     Neuropathy:  -started gabapentin, may need further up-titration as outpatient    Possible Gastroparesis: patient educated on, risk and benefit of reglan discussed, patient did not want at this time  Possible Candida Esophagitis  -start PPI and fluconazole  _______________________________________________________________________  Patient seen and examined by me on discharge day. Pertinent Findings:  Gen:    Not in distress  Chest: Clear lungs  CVS:   Regular rhythm.   No edema  Abd:  Soft, not distended, not tender  Neuro:  Alert  _______________________________________________________________________  DISCHARGE MEDICATIONS:   Current Discharge Medication List      START taking these medications    Details   gabapentin (NEURONTIN) 300 mg capsule Take 1 Cap by mouth three (3) times daily. Max Daily Amount: 900 mg. Qty: 90 Cap, Refills: 0    Associated Diagnoses: Neuropathy      pantoprazole (PROTONIX) 40 mg tablet Take 1 Tab by mouth Daily (before breakfast). Qty: 30 Tab, Refills: 0      insulin glargine (LANTUS,BASAGLAR) 100 unit/mL (3 mL) inpn Inject 42 unit sq daily. Qty: 6 Adjustable Dose Pre-filled Pen Syringe, Refills: 0      OTHER Needles for lantus pen  Qty: 1 Box, Refills: 0      fluconazole (DIFLUCAN) 100 mg tablet Take 1 Tab by mouth daily for 10 days. Qty: 10 Tab, Refills: 0         CONTINUE these medications which have NOT CHANGED    Details   tamsulosin (FLOMAX) 0.4 mg capsule Take 1 Cap by mouth daily. Qty: 30 Cap, Refills: 0      insulin regular (NOVOLIN R) 100 unit/mL injection 2-10 Units by SubCUTAneous route Before breakfast, lunch, and dinner. Blood Glucose (mg/dL)       Insulin Dose (units)              150-200                               2               201-250                               4               251-300                               6               301-350                               8               >351                                   10         STOP taking these medications       cephALEXin (KEFLEX) 500 mg capsule Comments:   Reason for Stopping:         insulin glargine (LANTUS U-100 INSULIN) 100 unit/mL injection Comments:   Reason for Stopping:                 Patient Follow Up Instructions:    With [facility]  Follow-up Information     Follow up With Specialties Details Why Contact Info    Anne Burgess NP Nurse Practitioner   [address] [place] [address]  [phone]          ________________________________________________________________    Risk of deterioration: Low    Condition at Discharge:  Stable  __________________________________________________________________    Disposition  Home with family, no needs    ____________________________________________________________________    Code Status: Full Code  ___________________________________________________________________      Total time in minutes spent coordinating this discharge (includes going over instructions, follow-up, prescriptions, and preparing report for sign off to her PCP) :  31 minutes    Signed:  Daryle Gins, MD

## 2019-11-08 ENCOUNTER — OFFICE VISIT (OUTPATIENT)
Dept: PRIMARY CARE CLINIC | Age: 39
End: 2019-11-08

## 2019-11-08 VITALS
SYSTOLIC BLOOD PRESSURE: 105 MMHG | RESPIRATION RATE: 16 BRPM | OXYGEN SATURATION: 99 % | BODY MASS INDEX: 19.31 KG/M2 | WEIGHT: 130.4 LBS | TEMPERATURE: 98.2 F | DIASTOLIC BLOOD PRESSURE: 71 MMHG | HEART RATE: 98 BPM | HEIGHT: 69 IN

## 2019-11-08 DIAGNOSIS — N13.30 HYDRONEPHROSIS OF RIGHT KIDNEY: Primary | ICD-10-CM

## 2019-11-08 DIAGNOSIS — E10.40 DIABETIC NEUROPATHY, TYPE I DIABETES MELLITUS (HCC): ICD-10-CM

## 2019-11-08 DIAGNOSIS — T83.9XXA PROBLEM WITH FOLEY CATHETER, INITIAL ENCOUNTER (HCC): ICD-10-CM

## 2019-11-08 PROBLEM — E10.10 DKA, TYPE 1, NOT AT GOAL (HCC): Status: RESOLVED | Noted: 2019-04-14 | Resolved: 2019-11-08

## 2019-11-08 PROBLEM — R11.2 NAUSEA WITH VOMITING: Status: RESOLVED | Noted: 2019-04-16 | Resolved: 2019-11-08

## 2019-11-08 RX ORDER — LIDOCAINE HYDROCHLORIDE 20 MG/ML
JELLY TOPICAL
Qty: 5 ML | Refills: 0 | Status: SHIPPED | OUTPATIENT
Start: 2019-11-08 | End: 2020-01-02

## 2019-11-08 NOTE — PROGRESS NOTES
Zeenat Billings is a 41 y.o. male     Chief Complaint   Patient presents with   [address]     seen at ED [facility] on 11/2/19 - 11/4/19 for Diabetic ketoacidosis       Visit Vitals  /71 (BP 1 Location: Left arm, BP Patient Position: Sitting)   Pulse 98   Temp 98.2 °F (36.8 °C) (Oral)   Resp 16   Ht 5' 9\" (1.753 m)   Wt 130 lb 6.4 oz (59.1 kg)   SpO2 99%   BMI 19.26 kg/m²       Health Maintenance Due   Topic Date Due    EYE EXAM RETINAL OR DILATED  11/22/1992    Pneumococcal 0-64 years (1 of 1 - PPSV23) 10/13/2011    MICROALBUMIN Q1  11/05/2015    LIPID PANEL Q1  03/22/2018    FOOT EXAM Q1  03/11/2019    Influenza Age 9 to Adult  08/01/2019       1. Have you been to the ER, urgent care clinic since your last visit? Hospitalized since your last visit? Yes seen at ED [facility] on 11/2/19 - 11/4/19 for Diabetic ketoacidosis. 2. Have you seen or consulted any other health care providers outside of the [address] [place] [address] since your last visit? Include any pap smears or colon screening.  No

## 2019-11-08 NOTE — PROGRESS NOTES
Written by Edgard Lopez, as dictated by Dr. Chari Tijerina MD.    Siria Garay is a 41 y.o. male. HPI  The patient presents today for hospital follow-up. He is a patient of Ari Rayo NP. He went to the ER on 10/29/19 for R-sided abdominal pain. CT Abdomen Pelvis taken on 10/29/19 showed \"Massive distention of the bladder. Severe right-sided hydronephrosis which may be due to reflux. Recommend post void ultrasound to confirm that the right-sided hydronephrosis resolves. No left-sided hydronephrosis. Marked thickening of the distal esophagus which can be seen with esophagitis. \" On admission, he was given fluids  , a  Beard Catheter was placed, and he was prescribed Flomax. After treatment,  US taken on 10/31/19 showed \"Resolved R hydronephrosis. \" He was told to continue on Flomax and have the Beard removed on follow-up. Within hours, he return to the ER due to diabetic ketoacidosis, and which was treated, and he was told to make an appointment with Urology or PCP for removal of catheter. He is currently in excruciating pain due to the catheter, and requests it be removed in office. Prior to his admission to the hospital, he was non-compliant, and did not see a PCP on a regular basis. He was not watching his diet and BS were running very high prior to admission. Since his hospital stay, he has been eating better, and sticking more to a high protein/fat diet. His BS at this time ranges between 100-270. Compliant on Lantus 42 units and Novolin 2-10 units. BS range between 100-270. He does not want the flu shot as got very sick with flu shot in the past.    He quit smoking 1 month ago. He does not have insurance at this time, but is working on getting the [employer].       Patient Active Problem List   Diagnosis Code    HTN (hypertension) I10    Tobacco abuse Z72.0    Tachycardia R00.0    Diabetic neuropathy, type I diabetes mellitus ([facility] E10.40    DKA, type 1 ([facility] E10.10    Neuropathy G62.9    Upper GI bleed K92.2    DKA, type 1, not at goal ([address] E10.10    DKA (diabetic ketoacidoses) (HCC) E11.10    Hydronephrosis of right kidney N13.30        Current Outpatient Medications on File Prior to Visit   Medication Sig Dispense Refill    gabapentin (NEURONTIN) 300 mg capsule Take 1 Cap by mouth three (3) times daily. Max Daily Amount: 900 mg. 90 Cap 0    pantoprazole (PROTONIX) 40 mg tablet Take 1 Tab by mouth Daily (before breakfast). 30 Tab 0    insulin glargine (LANTUS,BASAGLAR) 100 unit/mL (3 mL) inpn Inject 42 unit sq daily. 6 Adjustable Dose Pre-filled Pen Syringe 0    OTHER Needles for lantus pen 1 Box 0    fluconazole (DIFLUCAN) 100 mg tablet Take 1 Tab by mouth daily for 10 days. 10 Tab 0    tamsulosin (FLOMAX) 0.4 mg capsule Take 1 Cap by mouth daily. 30 Cap 0    insulin regular (NOVOLIN R) 100 unit/mL injection 2-10 Units by SubCUTAneous route Before breakfast, lunch, and dinner. Blood Glucose (mg/dL)       Insulin Dose (units)              150-200                               2               201-250                               4               251-300                               6               301-350                               8               >351                                   10       No current facility-administered medications on file prior to visit.         Past Medical History:   Diagnosis Date    Bipolar 1 disorder, depressed ([facility]     [facility]     Depression     Diabetes ([facility]     DKA, type 1 ([facility] 1/27/2013    diagnosed age 23    H/O noncompliance with medical treatment, presenting hazards to health     MRSA (methicillin resistant staph aureus) culture positive     MRSA (methicillin resistant Staphylococcus aureus)     Face    Noncompliance with medication regimen     Second hand smoke exposure     Seizure ([address]     Seizures ([address] 2006 or 2007    one episode during jail       Past Surgical History: Procedure Laterality Date    HX HEENT      top left wisdom tooth    HX ORTHOPAEDIC Left     wrist; MCV    UPPER GI ENDOSCOPY,BIOPSY  11/20/2018            Family History   Problem Relation Age of Onset    Diabetes Mother     Diabetes Other         neice, type 1        Social History     Socioeconomic History    Marital status: SINGLE     Spouse name: Not on file    Number of children: Not on file    Years of education: Not on file    Highest education level: Not on file   Occupational History    Not on file   Social Needs    Financial resource strain: Not on file    Food insecurity:     Worry: Not on file     Inability: Not on file    Transportation needs:     Medical: Not on file     Non-medical: Not on file   Tobacco Use    Smoking status: Current Some Day Smoker     Packs/day: 0.10     Years: 16.00     Pack years: 1.60     Types: Cigarettes    Smokeless tobacco: Never Used   Substance and Sexual Activity    Alcohol use: No    Drug use: No    Sexual activity: Not on file   Lifestyle    Physical activity:     Days per week: Not on file     Minutes per session: Not on file    Stress: Not on file   Relationships    Social connections:     Talks on phone: Not on file     Gets together: Not on file     Attends Shinto service: Not on file     Active member of club or organization: Not on file     Attends meetings of clubs or organizations: Not on file     Relationship status: Not on file    Intimate partner violence:     Fear of current or ex partner: Not on file     Emotionally abused: Not on file     Physically abused: Not on file     Forced sexual activity: Not on file   Other Topics Concern    Not on file   Social History Narrative    ** Merged History Encounter **            No results displayed because visit has over 200 results.       Admission on 10/29/2019, Discharged on 11/01/2019   Component Date Value Ref Range Status    WBC 10/29/2019 18.2* 4.1 - 11.1 K/uL Final    RBC 10/29/2019 5.41  4.10 - 5.70 M/uL Final    HGB 10/29/2019 17.1* 12.1 - 17.0 g/dL Final    HCT 10/29/2019 47.6  36.6 - 50.3 % Final    MCV 10/29/2019 88.0  80.0 - 99.0 FL Final    MCH 10/29/2019 31.6  26.0 - 34.0 PG Final    MCHC 10/29/2019 35.9  30.0 - 36.5 g/dL Final    RDW 10/29/2019 12.7  11.5 - 14.5 % Final    PLATELET 19/23/3764 769* 150 - 400 K/uL Final    MPV 10/29/2019 10.1  8.9 - 12.9 FL Final    NRBC 10/29/2019 0.0  0  WBC Final    ABSOLUTE NRBC 10/29/2019 0.00  0.00 - 0.01 K/uL Final    NEUTROPHILS 10/29/2019 79* 32 - 75 % Final    LYMPHOCYTES 10/29/2019 12  12 - 49 % Final    MONOCYTES 10/29/2019 9  5 - 13 % Final    EOSINOPHILS 10/29/2019 0  0 - 7 % Final    BASOPHILS 10/29/2019 0  0 - 1 % Final    IMMATURE GRANULOCYTES 10/29/2019 0  0.0 - 0.5 % Final    ABS. NEUTROPHILS 10/29/2019 14.2* 1.8 - 8.0 K/UL Final    ABS. LYMPHOCYTES 10/29/2019 2.2  0.8 - 3.5 K/UL Final    ABS. MONOCYTES 10/29/2019 1.7* 0.0 - 1.0 K/UL Final    ABS. EOSINOPHILS 10/29/2019 0.0  0.0 - 0.4 K/UL Final    ABS. BASOPHILS 10/29/2019 0.1  0.0 - 0.1 K/UL Final    ABS. IMM. GRANS.  10/29/2019 0.1* 0.00 - 0.04 K/UL Final    DF 10/29/2019 AUTOMATED    Final    Sodium 10/29/2019 139  136 - 145 mmol/L Final    Potassium 10/29/2019 3.5  3.5 - 5.1 mmol/L Final    Chloride 10/29/2019 94* 97 - 108 mmol/L Final    CO2 10/29/2019 31  21 - 32 mmol/L Final    Anion gap 10/29/2019 14  5 - 15 mmol/L Final    Glucose 10/29/2019 77  65 - 100 mg/dL Final    BUN 10/29/2019 47* 6 - 20 MG/DL Final    Creatinine 10/29/2019 2.23* 0.70 - 1.30 MG/DL Final    BUN/Creatinine ratio 10/29/2019 21* 12 - 20   Final    GFR est AA 10/29/2019 41* >60 ml/min/1.73m2 Final    GFR est non-AA 10/29/2019 34* >60 ml/min/1.73m2 Final    Calcium 10/29/2019 10.1  8.5 - 10.1 MG/DL Final    Bilirubin, total 10/29/2019 0.7  0.2 - 1.0 MG/DL Final    ALT (SGPT) 10/29/2019 68  12 - 78 U/L Final    AST (SGOT) 10/29/2019 18  15 - 37 U/L Final  Alk. phosphatase 10/29/2019 262* 45 - 117 U/L Final    Protein, total 10/29/2019 8.2  6.4 - 8.2 g/dL Final    Albumin 10/29/2019 3.5  3.5 - 5.0 g/dL Final    Globulin 10/29/2019 4.7* 2.0 - 4.0 g/dL Final    A-G Ratio 10/29/2019 0.7* 1.1 - 2.2   Final    Crossmatch Expiration 10/29/2019 11/01/2019   Final    ABO/Rh(D) 10/29/2019 A POSITIVE   Final    Antibody screen 10/29/2019 NEG   Final    Glucose (POC) 10/29/2019 85  65 - 100 mg/dL Final    Glucose (POC) 10/29/2019 80  65 - 100 mg/dL Final    Influenza A Antigen 10/29/2019 NEGATIVE   NEG   Final    Influenza B Antigen 10/29/2019 NEGATIVE   NEG   Final    Color 10/29/2019 YELLOW/STRAW    Final    Color Reference Range: Straw, Yellow or Dark Yellow    Appearance 10/29/2019 CLEAR  CLEAR   Final    Specific gravity 10/29/2019 1.027  1.003 - 1.030   Final    pH (UA) 10/29/2019 5.5  5.0 - 8.0   Final    Protein 10/29/2019 100* NEG mg/dL Final    Glucose 10/29/2019 >1,000* NEG mg/dL Final    Ketone 10/29/2019 80* NEG mg/dL Final    Bilirubin 10/29/2019 NEGATIVE   NEG   Final    Blood 10/29/2019 NEGATIVE   NEG   Final    Urobilinogen 10/29/2019 0.2  0.2 - 1.0 EU/dL Final    Nitrites 10/29/2019 NEGATIVE   NEG   Final    Leukocyte Esterase 10/29/2019 NEGATIVE   NEG   Final    WBC 10/29/2019 0-4  0 - 4 /hpf Final    RBC 10/29/2019 0-5  0 - 5 /hpf Final    Epithelial cells 10/29/2019 FEW  FEW /lpf Final    Bacteria 10/29/2019 NEGATIVE   NEG /hpf Final    UA:UC IF INDICATED 10/29/2019 CULTURE NOT INDICATED BY UA RESULT  CNI   Final    Hyaline cast 10/29/2019 2-5  0 - 5 /lpf Final    Occult blood, stool 10/29/2019 POSITIVE* NEG   Final    Lactic acid 10/29/2019 0.7  0.4 - 2.0 MMOL/L Final    Glucose (POC) 10/29/2019 46* 65 - 100 mg/dL Final    Glucose (POC) 10/29/2019 53* 65 - 100 mg/dL Final    Glucose (POC) 10/29/2019 66  65 - 100 mg/dL Final    Glucose (POC) 10/29/2019 92  65 - 100 mg/dL Final    Special Requests: 10/30/2019 NO SPECIAL REQUESTS    Final    Culture result: 10/30/2019 NO GROWTH 5 DAYS    Final    Sodium 10/30/2019 139  136 - 145 mmol/L Final    Potassium 10/30/2019 3.0* 3.5 - 5.1 mmol/L Final    Chloride 10/30/2019 101  97 - 108 mmol/L Final    CO2 10/30/2019 31  21 - 32 mmol/L Final    Anion gap 10/30/2019 7  5 - 15 mmol/L Final    Glucose 10/30/2019 96  65 - 100 mg/dL Final    BUN 10/30/2019 36* 6 - 20 MG/DL Final    Creatinine 10/30/2019 1.11  0.70 - 1.30 MG/DL Final    INVESTIGATED PER DELTA CHECK PROTOCOL    BUN/Creatinine ratio 10/30/2019 32* 12 - 20   Final    GFR est AA 10/30/2019 >60  >60 ml/min/1.73m2 Final    GFR est non-AA 10/30/2019 >60  >60 ml/min/1.73m2 Final    Calcium 10/30/2019 8.4* 8.5 - 10.1 MG/DL Final    WBC 10/30/2019 15.2* 4.1 - 11.1 K/uL Final    RBC 10/30/2019 4.02* 4.10 - 5.70 M/uL Final    HGB 10/30/2019 12.7  12.1 - 17.0 g/dL Final    INVESTIGATED PER DELTA CHECK PROTOCOL    HCT 10/30/2019 36.6  36.6 - 50.3 % Final    MCV 10/30/2019 91.0  80.0 - 99.0 FL Final    MCH 10/30/2019 31.6  26.0 - 34.0 PG Final    MCHC 10/30/2019 34.7  30.0 - 36.5 g/dL Final    RDW 10/30/2019 12.8  11.5 - 14.5 % Final    PLATELET 45/69/5221 436  150 - 400 K/uL Final    MPV 10/30/2019 10.2  8.9 - 12.9 FL Final    NRBC 10/30/2019 0.0  0  WBC Final    ABSOLUTE NRBC 10/30/2019 0.00  0.00 - 0.01 K/uL Final    NEUTROPHILS 10/30/2019 79* 32 - 75 % Final    LYMPHOCYTES 10/30/2019 14  12 - 49 % Final    MONOCYTES 10/30/2019 7  5 - 13 % Final    EOSINOPHILS 10/30/2019 0  0 - 7 % Final    BASOPHILS 10/30/2019 0  0 - 1 % Final    IMMATURE GRANULOCYTES 10/30/2019 1* 0.0 - 0.5 % Final    ABS. NEUTROPHILS 10/30/2019 11.9* 1.8 - 8.0 K/UL Final    ABS. LYMPHOCYTES 10/30/2019 2.1  0.8 - 3.5 K/UL Final    ABS. MONOCYTES 10/30/2019 1.1* 0.0 - 1.0 K/UL Final    ABS. EOSINOPHILS 10/30/2019 0.0  0.0 - 0.4 K/UL Final    ABS. BASOPHILS 10/30/2019 0.0  0.0 - 0.1 K/UL Final    ABS. IMM. GRANS.  10/30/2019 0.1* 0.00 - 0.04 K/UL Final    DF 10/30/2019 AUTOMATED    Final    Glucose (POC) 10/30/2019 126* 65 - 100 mg/dL Final    Glucose (POC) 10/30/2019 89  65 - 100 mg/dL Final    Glucose (POC) 10/30/2019 102* 65 - 100 mg/dL Final    Glucose (POC) 10/30/2019 96  65 - 100 mg/dL Final    Glucose (POC) 10/31/2019 173* 65 - 100 mg/dL Final    Glucose (POC) 10/31/2019 208* 65 - 100 mg/dL Final    Glucose (POC) 10/31/2019 200* 65 - 100 mg/dL Final    Glucose (POC) 10/31/2019 209* 65 - 100 mg/dL Final    Glucose (POC) 10/31/2019 156* 65 - 100 mg/dL Final    Sodium 11/01/2019 134* 136 - 145 mmol/L Final    Potassium 11/01/2019 3.5  3.5 - 5.1 mmol/L Final    Chloride 11/01/2019 100  97 - 108 mmol/L Final    CO2 11/01/2019 25  21 - 32 mmol/L Final    Anion gap 11/01/2019 9  5 - 15 mmol/L Final    Glucose 11/01/2019 167* 65 - 100 mg/dL Final    BUN 11/01/2019 15  6 - 20 MG/DL Final    Creatinine 11/01/2019 0.57* 0.70 - 1.30 MG/DL Final    BUN/Creatinine ratio 11/01/2019 26* 12 - 20   Final    GFR est AA 11/01/2019 >60  >60 ml/min/1.73m2 Final    GFR est non-AA 11/01/2019 >60  >60 ml/min/1.73m2 Final    Calcium 11/01/2019 8.9  8.5 - 10.1 MG/DL Final    WBC 11/01/2019 9.6  4.1 - 11.1 K/uL Final    RBC 11/01/2019 4.41  4.10 - 5.70 M/uL Final    HGB 11/01/2019 13.9  12.1 - 17.0 g/dL Final    HCT 11/01/2019 39.8  36.6 - 50.3 % Final    MCV 11/01/2019 90.2  80.0 - 99.0 FL Final    MCH 11/01/2019 31.5  26.0 - 34.0 PG Final    MCHC 11/01/2019 34.9  30.0 - 36.5 g/dL Final    RDW 11/01/2019 11.9  11.5 - 14.5 % Final    PLATELET 26/93/5087 302  150 - 400 K/uL Final    MPV 11/01/2019 10.2  8.9 - 12.9 FL Final    NRBC 11/01/2019 0.0  0  WBC Final    ABSOLUTE NRBC 11/01/2019 0.00  0.00 - 0.01 K/uL Final    Glucose (POC) 11/01/2019 238* 65 - 100 mg/dL Final    SAMPLES BEING HELD 11/01/2019  LAV, PST   Final    Glucose (POC) 11/01/2019 169* 65 - 100 mg/dL Final       Review of Systems   Respiratory: Negative for cough and shortness of breath. Genitourinary: Negative for dysuria, frequency and urgency. + catheter pain   Musculoskeletal: Negative for joint pain and myalgias. Neurological: Negative for dizziness and headaches. Psychiatric/Behavioral: Negative for depression and substance abuse. The patient is not nervous/anxious and does not have insomnia. Visit Vitals  /71 (BP 1 Location: Left arm, BP Patient Position: Sitting)   Pulse 98   Temp 98.2 °F (36.8 °C) (Oral)   Resp 16   Ht 5' 9\" (1.753 m)   Wt 130 lb 6.4 oz (59.1 kg)   SpO2 99%   BMI 19.26 kg/m²       Physical Exam   Constitutional: He is oriented to person, place, and time. He appears well-developed and well-nourished. No distress. HENT:   Head: Normocephalic and atraumatic. Right Ear: External ear normal.   Left Ear: External ear normal.   Eyes: Pupils are equal, round, and reactive to light. Conjunctivae and EOM are normal. Right eye exhibits no discharge. Left eye exhibits no discharge. Neck: Normal range of motion. Neck supple. Cardiovascular: Normal rate, regular rhythm and normal heart sounds. Exam reveals no gallop and no friction rub. No murmur heard. Pulmonary/Chest: Effort normal and breath sounds normal. He has no wheezes. Abdominal: Soft. Bowel sounds are normal. There is no tenderness. Musculoskeletal: Normal range of motion. Neurological: He is alert and oriented to person, place, and time. He has normal reflexes. Skin: He is not diaphoretic. Psychiatric: He has a normal mood and affect. His behavior is normal. Thought content normal.   Nursing note and vitals reviewed. ASSESSMENT and PLAN    ICD-10-CM ICD-9-CM    1. Hydronephrosis of right kidney N13.30 591 Resolved, based on US on 10/29/19. 2. Problem with Beard catheter, [facility] T83. 9XXA 996.76 lidocaine (XYLOCAINE) 2 % jelly sent to pharmacy.      Time out: Immediately prior to procedure a \"time out\" was called to verify the correct patient, procedure, equipment, support staff and site/side marked as required. NN removed the catheter in office. Pt tolerated well. Lidocaine 2% gel prescribed. Potential side effects were discussed. Pt should apply as needed for pain. Advised pt to continue using Flomax. If he is unable to urniate by tonight, pt should go to the ED. 3. Diabetic neuropathy, type I diabetes mellitus ([facility] E10.40 250.61 Reviewed and decided to continue present medications. 357.2   Advised pt that BS should not be running about 200. This plan was reviewed with the patient and patient agrees. All questions were answered. This scribe documentation was reviewed by me and accurately reflects the examination and decisions made by me. This note will not be viewable in [address]

## 2020-01-02 ENCOUNTER — APPOINTMENT (OUTPATIENT)
Dept: GENERAL RADIOLOGY | Age: 40
DRG: 638 | End: 2020-01-02
Attending: INTERNAL MEDICINE
Payer: SUBSIDIZED

## 2020-01-02 ENCOUNTER — HOSPITAL ENCOUNTER (INPATIENT)
Age: 40
LOS: 3 days | Discharge: HOME | DRG: 638 | End: 2020-01-05
Attending: EMERGENCY MEDICINE | Admitting: INTERNAL MEDICINE
Payer: SUBSIDIZED

## 2020-01-02 ENCOUNTER — APPOINTMENT (OUTPATIENT)
Dept: ULTRASOUND IMAGING | Age: 40
DRG: 638 | End: 2020-01-02
Attending: INTERNAL MEDICINE
Payer: SUBSIDIZED

## 2020-01-02 DIAGNOSIS — N17.9 AKI (ACUTE KIDNEY INJURY) (HCC): ICD-10-CM

## 2020-01-02 DIAGNOSIS — E10.10 DIABETIC KETOACIDOSIS WITHOUT COMA ASSOCIATED WITH TYPE 1 DIABETES MELLITUS (HCC): Primary | ICD-10-CM

## 2020-01-02 DIAGNOSIS — E87.20 LACTIC ACIDOSIS: ICD-10-CM

## 2020-01-02 LAB
AMPHET UR QL SCN: NEGATIVE
ANION GAP BLD CALC-SCNC: 26 MMOL/L (ref 10–20)
ANION GAP SERPL CALC-SCNC: 33 MMOL/L (ref 5–15)
APPEARANCE UR: CLEAR
ARTERIAL PATENCY WRIST A: ABNORMAL
B-OH-BUTYR SERPL-SCNC: >4.42 MMOL/L
BACTERIA URNS QL MICRO: NEGATIVE /HPF
BARBITURATES UR QL SCN: NEGATIVE
BASE DEFICIT BLDV-SCNC: 20 MMOL/L
BASOPHILS # BLD: 0.1 K/UL (ref 0–0.1)
BASOPHILS NFR BLD: 0 % (ref 0–1)
BDY SITE: ABNORMAL
BENZODIAZ UR QL: NEGATIVE
BILIRUB UR QL: NEGATIVE
BUN BLD-MCNC: 30 MG/DL (ref 9–20)
BUN SERPL-MCNC: 31 MG/DL (ref 6–20)
BUN/CREAT SERPL: 19 (ref 12–20)
CA-I BLD-MCNC: 1.13 MMOL/L (ref 1.12–1.32)
CALCIUM SERPL-MCNC: 9.3 MG/DL (ref 8.5–10.1)
CANNABINOIDS UR QL SCN: NEGATIVE
CHLORIDE BLD-SCNC: 98 MMOL/L (ref 98–107)
CHLORIDE SERPL-SCNC: 88 MMOL/L (ref 97–108)
CO2 BLD-SCNC: 12 MMOL/L (ref 21–32)
CO2 SERPL-SCNC: 10 MMOL/L (ref 21–32)
COCAINE UR QL SCN: NEGATIVE
COLOR UR: ABNORMAL
CREAT BLD-MCNC: 1.1 MG/DL (ref 0.6–1.3)
CREAT SERPL-MCNC: 1.64 MG/DL (ref 0.7–1.3)
DIFFERENTIAL METHOD BLD: ABNORMAL
DRUG SCRN COMMENT,DRGCM: NORMAL
EOSINOPHIL # BLD: 0.1 K/UL (ref 0–0.4)
EOSINOPHIL NFR BLD: 1 % (ref 0–7)
EPITH CASTS URNS QL MICRO: ABNORMAL /LPF
ERYTHROCYTE [DISTWIDTH] IN BLOOD BY AUTOMATED COUNT: 14.1 % (ref 11.5–14.5)
GAS FLOW.O2 O2 DELIVERY SYS: ABNORMAL L/MIN
GLUCOSE BLD STRIP.AUTO-MCNC: 271 MG/DL (ref 65–100)
GLUCOSE BLD STRIP.AUTO-MCNC: 389 MG/DL (ref 65–100)
GLUCOSE BLD STRIP.AUTO-MCNC: 509 MG/DL (ref 65–100)
GLUCOSE BLD STRIP.AUTO-MCNC: >600 MG/DL (ref 65–100)
GLUCOSE BLD-MCNC: >700 MG/DL (ref 65–100)
GLUCOSE SERPL-MCNC: 891 MG/DL (ref 65–100)
GLUCOSE UR STRIP.AUTO-MCNC: >1000 MG/DL
HCO3 BLDV-SCNC: 8.4 MMOL/L (ref 23–28)
HCT VFR BLD AUTO: 46.7 % (ref 36.6–50.3)
HCT VFR BLD CALC: 49 % (ref 36.6–50.3)
HGB BLD-MCNC: 15.2 G/DL (ref 12.1–17)
HGB UR QL STRIP: ABNORMAL
HYALINE CASTS URNS QL MICRO: ABNORMAL /LPF (ref 0–5)
IMM GRANULOCYTES # BLD AUTO: 0.2 K/UL (ref 0–0.04)
IMM GRANULOCYTES NFR BLD AUTO: 1 % (ref 0–0.5)
INR PPP: 1 (ref 0.9–1.1)
KETONES UR QL STRIP.AUTO: >80 MG/DL
LACTATE SERPL-SCNC: 4.1 MMOL/L (ref 0.4–2)
LEUKOCYTE ESTERASE UR QL STRIP.AUTO: NEGATIVE
LIPASE SERPL-CCNC: 242 U/L (ref 73–393)
LYMPHOCYTES # BLD: 1.6 K/UL (ref 0.8–3.5)
LYMPHOCYTES NFR BLD: 11 % (ref 12–49)
MAGNESIUM SERPL-MCNC: 2.9 MG/DL (ref 1.6–2.4)
MCH RBC QN AUTO: 31.7 PG (ref 26–34)
MCHC RBC AUTO-ENTMCNC: 32.5 G/DL (ref 30–36.5)
MCV RBC AUTO: 97.3 FL (ref 80–99)
METHADONE UR QL: NEGATIVE
MONOCYTES # BLD: 0.6 K/UL (ref 0–1)
MONOCYTES NFR BLD: 4 % (ref 5–13)
NEUTS SEG # BLD: 11.9 K/UL (ref 1.8–8)
NEUTS SEG NFR BLD: 83 % (ref 32–75)
NITRITE UR QL STRIP.AUTO: NEGATIVE
NRBC # BLD: 0 K/UL (ref 0–0.01)
NRBC BLD-RTO: 0 PER 100 WBC
OPIATES UR QL: NEGATIVE
PCO2 BLDV: 22.6 MMHG (ref 41–51)
PCP UR QL: NEGATIVE
PH BLDV: 7.18 [PH] (ref 7.32–7.42)
PH UR STRIP: 5.5 [PH] (ref 5–8)
PHOSPHATE SERPL-MCNC: 7.2 MG/DL (ref 2.6–4.7)
PLATELET # BLD AUTO: 494 K/UL (ref 150–400)
PMV BLD AUTO: 10.1 FL (ref 8.9–12.9)
PO2 BLDV: 49 MMHG (ref 25–40)
POTASSIUM BLD-SCNC: 5.7 MMOL/L (ref 3.5–5.1)
POTASSIUM SERPL-SCNC: 5.6 MMOL/L (ref 3.5–5.1)
PROT UR STRIP-MCNC: 30 MG/DL
PROTHROMBIN TIME: 9.8 SEC (ref 9–11.1)
RBC # BLD AUTO: 4.8 M/UL (ref 4.1–5.7)
RBC #/AREA URNS HPF: ABNORMAL /HPF (ref 0–5)
SAO2 % BLDV: 76 % (ref 65–88)
SERVICE CMNT-IMP: ABNORMAL
SODIUM BLD-SCNC: 129 MMOL/L (ref 136–145)
SODIUM SERPL-SCNC: 131 MMOL/L (ref 136–145)
SP GR UR REFRACTOMETRY: 1.03 (ref 1–1.03)
SPECIMEN TYPE: ABNORMAL
TOTAL RESP. RATE, ITRR: 18
UA: UC IF INDICATED,UAUC: ABNORMAL
UROBILINOGEN UR QL STRIP.AUTO: 0.2 EU/DL (ref 0.2–1)
WBC # BLD AUTO: 14.5 K/UL (ref 4.1–11.1)
WBC URNS QL MICRO: ABNORMAL /HPF (ref 0–4)

## 2020-01-02 PROCEDURE — 81001 URINALYSIS AUTO W/SCOPE: CPT

## 2020-01-02 PROCEDURE — 74011250636 HC RX REV CODE- 250/636: Performed by: INTERNAL MEDICINE

## 2020-01-02 PROCEDURE — 85025 COMPLETE CBC W/AUTO DIFF WBC: CPT

## 2020-01-02 PROCEDURE — 74011000250 HC RX REV CODE- 250: Performed by: INTERNAL MEDICINE

## 2020-01-02 PROCEDURE — 74022 RADEX COMPL AQT ABD SERIES: CPT

## 2020-01-02 PROCEDURE — 82962 GLUCOSE BLOOD TEST: CPT

## 2020-01-02 PROCEDURE — 80048 BASIC METABOLIC PNL TOTAL CA: CPT

## 2020-01-02 PROCEDURE — 99285 EMERGENCY DEPT VISIT HI MDM: CPT

## 2020-01-02 PROCEDURE — 83690 ASSAY OF LIPASE: CPT

## 2020-01-02 PROCEDURE — 74011636637 HC RX REV CODE- 636/637: Performed by: INTERNAL MEDICINE

## 2020-01-02 PROCEDURE — 96361 HYDRATE IV INFUSION ADD-ON: CPT

## 2020-01-02 PROCEDURE — 74011250636 HC RX REV CODE- 250/636: Performed by: EMERGENCY MEDICINE

## 2020-01-02 PROCEDURE — 82010 KETONE BODYS QUAN: CPT

## 2020-01-02 PROCEDURE — 74011636637 HC RX REV CODE- 636/637: Performed by: EMERGENCY MEDICINE

## 2020-01-02 PROCEDURE — 96374 THER/PROPH/DIAG INJ IV PUSH: CPT

## 2020-01-02 PROCEDURE — 76770 US EXAM ABDO BACK WALL COMP: CPT

## 2020-01-02 PROCEDURE — 96375 TX/PRO/DX INJ NEW DRUG ADDON: CPT

## 2020-01-02 PROCEDURE — 74011000258 HC RX REV CODE- 258: Performed by: INTERNAL MEDICINE

## 2020-01-02 PROCEDURE — 36415 COLL VENOUS BLD VENIPUNCTURE: CPT

## 2020-01-02 PROCEDURE — 85610 PROTHROMBIN TIME: CPT

## 2020-01-02 PROCEDURE — 80307 DRUG TEST PRSMV CHEM ANLYZR: CPT

## 2020-01-02 PROCEDURE — 65660000000 HC RM CCU STEPDOWN

## 2020-01-02 PROCEDURE — 84100 ASSAY OF PHOSPHORUS: CPT

## 2020-01-02 PROCEDURE — 83605 ASSAY OF LACTIC ACID: CPT

## 2020-01-02 PROCEDURE — 83735 ASSAY OF MAGNESIUM: CPT

## 2020-01-02 PROCEDURE — 74011000258 HC RX REV CODE- 258: Performed by: EMERGENCY MEDICINE

## 2020-01-02 PROCEDURE — 80047 BASIC METABLC PNL IONIZED CA: CPT

## 2020-01-02 PROCEDURE — 82803 BLOOD GASES ANY COMBINATION: CPT

## 2020-01-02 RX ORDER — INSULIN LISPRO 100 [IU]/ML
INJECTION, SOLUTION INTRAVENOUS; SUBCUTANEOUS
Status: DISCONTINUED | OUTPATIENT
Start: 2020-01-03 | End: 2020-01-04

## 2020-01-02 RX ORDER — DEXTROSE MONOHYDRATE 100 MG/ML
0-250 INJECTION, SOLUTION INTRAVENOUS AS NEEDED
Status: DISCONTINUED | OUTPATIENT
Start: 2020-01-02 | End: 2020-01-04

## 2020-01-02 RX ORDER — MAGNESIUM SULFATE 100 %
4 CRYSTALS MISCELLANEOUS AS NEEDED
Status: DISCONTINUED | OUTPATIENT
Start: 2020-01-02 | End: 2020-01-02

## 2020-01-02 RX ORDER — ACETAMINOPHEN 325 MG/1
650 TABLET ORAL
Status: DISCONTINUED | OUTPATIENT
Start: 2020-01-02 | End: 2020-01-05 | Stop reason: HOSPADM

## 2020-01-02 RX ORDER — SODIUM CHLORIDE 9 MG/ML
125 INJECTION, SOLUTION INTRAVENOUS CONTINUOUS
Status: DISCONTINUED | OUTPATIENT
Start: 2020-01-02 | End: 2020-01-03

## 2020-01-02 RX ORDER — ONDANSETRON 2 MG/ML
4 INJECTION INTRAMUSCULAR; INTRAVENOUS
Status: DISCONTINUED | OUTPATIENT
Start: 2020-01-02 | End: 2020-01-05 | Stop reason: HOSPADM

## 2020-01-02 RX ORDER — HEPARIN SODIUM 5000 [USP'U]/ML
5000 INJECTION, SOLUTION INTRAVENOUS; SUBCUTANEOUS EVERY 12 HOURS
Status: DISCONTINUED | OUTPATIENT
Start: 2020-01-02 | End: 2020-01-05 | Stop reason: HOSPADM

## 2020-01-02 RX ORDER — DEXTROSE MONOHYDRATE 100 MG/ML
0-250 INJECTION, SOLUTION INTRAVENOUS AS NEEDED
Status: DISCONTINUED | OUTPATIENT
Start: 2020-01-02 | End: 2020-01-02

## 2020-01-02 RX ORDER — ONDANSETRON 2 MG/ML
4 INJECTION INTRAMUSCULAR; INTRAVENOUS
Status: COMPLETED | OUTPATIENT
Start: 2020-01-02 | End: 2020-01-02

## 2020-01-02 RX ORDER — LABETALOL HYDROCHLORIDE 5 MG/ML
10 INJECTION, SOLUTION INTRAVENOUS
Status: DISCONTINUED | OUTPATIENT
Start: 2020-01-02 | End: 2020-01-05 | Stop reason: HOSPADM

## 2020-01-02 RX ORDER — MAGNESIUM SULFATE 100 %
4 CRYSTALS MISCELLANEOUS AS NEEDED
Status: DISCONTINUED | OUTPATIENT
Start: 2020-01-02 | End: 2020-01-04

## 2020-01-02 RX ORDER — MORPHINE SULFATE 2 MG/ML
2 INJECTION, SOLUTION INTRAMUSCULAR; INTRAVENOUS
Status: DISCONTINUED | OUTPATIENT
Start: 2020-01-02 | End: 2020-01-05 | Stop reason: HOSPADM

## 2020-01-02 RX ORDER — INSULIN LISPRO 100 [IU]/ML
INJECTION, SOLUTION INTRAVENOUS; SUBCUTANEOUS
Status: DISCONTINUED | OUTPATIENT
Start: 2020-01-02 | End: 2020-01-02

## 2020-01-02 RX ADMIN — SODIUM CHLORIDE 1000 ML: 900 INJECTION, SOLUTION INTRAVENOUS at 17:57

## 2020-01-02 RX ADMIN — SODIUM CHLORIDE 13.2 UNITS/HR: 9 INJECTION, SOLUTION INTRAVENOUS at 22:21

## 2020-01-02 RX ADMIN — ONDANSETRON 4 MG: 2 INJECTION INTRAMUSCULAR; INTRAVENOUS at 17:56

## 2020-01-02 RX ADMIN — SODIUM CHLORIDE 1000 ML: 900 INJECTION, SOLUTION INTRAVENOUS at 19:05

## 2020-01-02 RX ADMIN — FAMOTIDINE 20 MG: 10 INJECTION, SOLUTION INTRAVENOUS at 21:08

## 2020-01-02 RX ADMIN — HEPARIN SODIUM 5000 UNITS: 5000 INJECTION INTRAVENOUS; SUBCUTANEOUS at 21:08

## 2020-01-02 RX ADMIN — SODIUM CHLORIDE 125 ML/HR: 900 INJECTION, SOLUTION INTRAVENOUS at 21:10

## 2020-01-02 RX ADMIN — SODIUM CHLORIDE 8.8 UNITS/HR: 9 INJECTION, SOLUTION INTRAVENOUS at 23:12

## 2020-01-02 RX ADMIN — ONDANSETRON HYDROCHLORIDE 4 MG: 2 INJECTION, SOLUTION INTRAMUSCULAR; INTRAVENOUS at 23:39

## 2020-01-02 RX ADMIN — SODIUM CHLORIDE 10.8 UNITS/HR: 9 INJECTION, SOLUTION INTRAVENOUS at 19:04

## 2020-01-02 NOTE — ED PROVIDER NOTES
EMERGENCY DEPARTMENT HISTORY AND PHYSICAL EXAM      Date: 1/2/2020  Patient Name: Yvan Mathis    Please note that this dictation was completed with [employer], the computer voice recognition software. Quite often unanticipated grammatical, syntax, homophones, and other interpretive errors are inadvertently transcribed by the computer software. Please disregard these errors. Please excuse any errors that have escaped final proofreading. History of Presenting Illness     Chief Complaint   Patient presents with    Vomiting    High Blood Sugar       History Provided By: Patient    HPI: Yvan Mathis, 42 y.o. male, insulin-dependent diabetic presenting the emergency department complaining of nausea, vomiting, high blood sugar. Symptoms started on Tuesday. No fever. No known underlying infection. Reports compliance with his insulin. Found his blood sugar to be high today, EMS read over 600. Patient denies any fevers or chills. Admits to cramping abdominal pain, no exacerbating or relieving factors. Denies any change in stool. Denies any rashes, shortness of breath or cough. Denies any illicit drug use, denies any alcohol use. PCP: Karli Lowery NP    No current facility-administered medications on file prior to encounter. Current Outpatient Medications on File Prior to Encounter   Medication Sig Dispense Refill    lidocaine (XYLOCAINE) 2 % jelly Needs in 5 cc syringe for hansen catheter removal 5 mL 0    gabapentin (NEURONTIN) 300 mg capsule Take 1 Cap by mouth three (3) times daily. Max Daily Amount: 900 mg. 90 Cap 0    pantoprazole (PROTONIX) 40 mg tablet Take 1 Tab by mouth Daily (before breakfast). 30 Tab 0    insulin glargine (LANTUS,BASAGLAR) 100 unit/mL (3 mL) inpn Inject 42 unit sq daily. 6 Adjustable Dose Pre-filled Pen Syringe 0    OTHER Needles for lantus pen 1 Box 0    tamsulosin (FLOMAX) 0.4 mg capsule Take 1 Cap by mouth daily.  30 Cap 0    insulin regular (Monique [place] R) 100 unit/mL injection 2-10 Units by SubCUTAneous route Before breakfast, lunch, and dinner. Blood Glucose (mg/dL)       Insulin Dose (units)              150-200                               2               201-250                               4               251-300                               6               301-350                               8               >351                                   10         Past History     Past Medical History:  Past Medical History:   Diagnosis Date    B[facility] 1 disorder, depressed [facility] [address]     Bipolar disorder [facility] [address]     Depression     Diabetes ([facility] [address]     DKA, type 1 ([facility] [address] 1/27/2013    diagnosed age 23    H/O noncompliance with medical treatment, presenting hazards to health     MRSA (methicillin resistant staph aureus) culture positive     MRSA (methicillin resistant Staphylococcus aureus)     Face    Noncompliance with medication regimen     Second hand smoke exposure     Seizure ([facility] [address]     Seizures ([facility] [address] 2006 or 2007    one episode during long-term       Past Surgical History:  Past Surgical History:   Procedure Laterality Date    HX HEENT      top left wisdom tooth    HX ORTHOPAEDIC Left     wrist; MCV    UPPER GI ENDOSCOPY,BIOPSY  11/20/2018            Family History:  Family History   Problem Relation Age of Onset    Diabetes Mother     Diabetes Other         neice, type 1        Social History:  Social History     Tobacco Use    Smoking status: Current Some Day Smoker     Packs/day: 0.10     Years: 16.00     Pack years: 1.60     Types: Cigarettes    Smokeless tobacco: Never Used   Substance Use Topics    Alcohol use: No    Drug use: No       Allergies:  No Known Allergies      Review of Systems   Review of Systems   Constitutional: Positive for fatigue. Negative for chills and fever. HENT: Negative for congestion and sore throat. Eyes: Negative for visual disturbance. Respiratory: Negative for cough and shortness of breath.     Cardiovascular: Negative for chest pain and leg swelling. Gastrointestinal: Positive for abdominal pain, nausea and vomiting. Negative for blood in stool and diarrhea. Endocrine: Positive for polyuria. Genitourinary: Negative for dysuria and testicular pain. Musculoskeletal: Negative for arthralgias, joint swelling and myalgias. Skin: Negative for rash. Allergic/Immunologic: Negative for immunocompromised state. Neurological: Negative for weakness and headaches. Hematological: Does not bruise/bleed easily. Psychiatric/Behavioral: Negative for confusion. Physical Exam   Physical Exam  Vitals signs and nursing note reviewed. Constitutional:       General: He is in acute distress. Appearance: He is well-developed. He is ill-appearing. Comments: Ill-appearing male, mild distress, smells of ketones   HENT:      Head: Normocephalic and atraumatic. Mouth/Throat:      Mouth: Mucous membranes are dry. Eyes:      General:         Right eye: No discharge. Left eye: No discharge. Conjunctiva/sclera: Conjunctivae normal.      Pupils: Pupils are equal, round, and reactive to light. Neck:      Musculoskeletal: Normal range of motion and neck supple. Trachea: No tracheal deviation. Cardiovascular:      Rate and Rhythm: Normal rate and regular rhythm. Heart sounds: Normal heart sounds. No murmur. Pulmonary:      Effort: Pulmonary effort is normal. No respiratory distress. Breath sounds: Normal breath sounds. No wheezing or rales. Abdominal:      General: Bowel sounds are normal.      Palpations: Abdomen is soft. Tenderness: There is no tenderness. There is no guarding or rebound. Musculoskeletal: Normal range of motion. General: No tenderness or deformity. Skin:     General: Skin is warm and dry. Findings: No erythema or rash. Neurological:      Mental Status: He is alert and oriented to person, place, and time.    Psychiatric:      Comments: Tired appearing, falls asleep during exam         Diagnostic Study Results     Labs -     Recent Results (from the past 12 hour(s))   GLUCOSE, POC    Collection Time: 01/02/20  5:08 PM   Result Value Ref Range    Glucose (POC) >600 (HH) 65 - 100 mg/dL    Performed by Chloe Newell RN (traveler)        Radiologic Studies -   No orders to display     CT Results  (Last 48 hours)    None        CXR Results  (Last 48 hours)    None            Medical Decision Making   I am the first provider for this patient. I reviewed the vital signs, available nursing notes, past medical history, past surgical history, family history and social history. Vital Signs-Reviewed the patient's vital signs. Patient Vitals for the past 12 hrs:   Temp Pulse Resp SpO2   01/02/20 1714 -- -- -- 100 %   01/02/20 1700 97.4 °F (36.3 °C) (!) 103 13 100 %       Pulse Oximetry Analysis 100% on room air with good waveform    Cardiac Monitor:   Sinus tachycardia with peaked T waves. Interpreted by me      Records Reviewed: Nursing Notes and Old Medical Records    Provider Notes (Medical Decision Making):   Patient likely in DKA. Will initiate lab work-up, IV fluids. Insulin drip after potassium comes back. ED Course:   Initial assessment performed. The patients presenting problems have been discussed, and they are in agreement with the care plan formulated and outlined with them. I have encouraged them to ask questions as they arise throughout their visit. Patient's potassium actually elevated, will start insulin drip early with IV fluids. Will admit to the hospitalist.      Critical Care Time:   I have spent 45 minutes of critical care time in evaluating and treating this patient. This includes time spent at bedside, time with family and decision makers, documentation, review of labs and imaging, and/or consultation with specialists. It does not include time spent on separately billed procedures.      This patient presents with a critical illness or injury that acutely impairs one or more vital organ systems such that there is a high probability of imminent or life threatening deterioration in the patient's condition. This case involved decision making of high complexity to assess, manipulate, and support vital organ system failure and/or to prevent further life threatening deterioration of the patient's condition. Failure to initiate these interventions on an urgent basis would likely result in sudden, clinically significant or life threatening deterioration in the patient's condition. Abnormal findings supporting critical care: DKA, anion gap acidosis, hyperglycemia, tachycardia  Interventions to support critical care: IV fluids, insulin, admission, cardiac monitoring  Failure to intervene may result in: Worsening acidosis, cerebral edema, death      Disposition:  Patient is being admitted to the hospital by Dr. Bennie Lopez. The results of their tests and reasons for their admission have been discussed with them and/or available family. They convey agreement and understanding for the need to be admitted and for their admission diagnosis. Consultation has been made with the inpatient physician specialist for hospitalization. PLAN:  1. Current Discharge Medication List        2. Follow-up Information    None         Return to ED if worse     Diagnosis     Clinical Impression:   1. Diabetic ketoacidosis without coma associated with type 1 diabetes mellitus ([facility] [address]    2. FELECIA (acute kidney injury) ([facility] [address]    3. Lactic acidosis        Attestations:   This note was completed by Km Acevedo DO

## 2020-01-03 ENCOUNTER — APPOINTMENT (OUTPATIENT)
Dept: CT IMAGING | Age: 40
DRG: 638 | End: 2020-01-03
Attending: INTERNAL MEDICINE
Payer: SUBSIDIZED

## 2020-01-03 LAB
ALBUMIN SERPL-MCNC: 2.5 G/DL (ref 3.5–5)
ALBUMIN/GLOB SERPL: 0.8 {RATIO} (ref 1.1–2.2)
ALP SERPL-CCNC: 216 U/L (ref 45–117)
ALT SERPL-CCNC: 78 U/L (ref 12–78)
ANION GAP SERPL CALC-SCNC: 10 MMOL/L (ref 5–15)
ANION GAP SERPL CALC-SCNC: 12 MMOL/L (ref 5–15)
ANION GAP SERPL CALC-SCNC: 16 MMOL/L (ref 5–15)
ANION GAP SERPL CALC-SCNC: 18 MMOL/L (ref 5–15)
AST SERPL-CCNC: 19 U/L (ref 15–37)
BASOPHILS # BLD: 0.1 K/UL (ref 0–0.1)
BASOPHILS NFR BLD: 0 % (ref 0–1)
BILIRUB SERPL-MCNC: 0.3 MG/DL (ref 0.2–1)
BUN SERPL-MCNC: 29 MG/DL (ref 6–20)
BUN SERPL-MCNC: 33 MG/DL (ref 6–20)
BUN SERPL-MCNC: 34 MG/DL (ref 6–20)
BUN SERPL-MCNC: 35 MG/DL (ref 6–20)
BUN/CREAT SERPL: 21 (ref 12–20)
BUN/CREAT SERPL: 22 (ref 12–20)
BUN/CREAT SERPL: 23 (ref 12–20)
BUN/CREAT SERPL: 29 (ref 12–20)
CALCIUM SERPL-MCNC: 7.8 MG/DL (ref 8.5–10.1)
CALCIUM SERPL-MCNC: 7.8 MG/DL (ref 8.5–10.1)
CALCIUM SERPL-MCNC: 8 MG/DL (ref 8.5–10.1)
CALCIUM SERPL-MCNC: 8.3 MG/DL (ref 8.5–10.1)
CHLORIDE SERPL-SCNC: 108 MMOL/L (ref 97–108)
CHLORIDE SERPL-SCNC: 109 MMOL/L (ref 97–108)
CHLORIDE SERPL-SCNC: 110 MMOL/L (ref 97–108)
CHLORIDE SERPL-SCNC: 111 MMOL/L (ref 97–108)
CK SERPL-CCNC: 67 U/L (ref 39–308)
CO2 SERPL-SCNC: 16 MMOL/L (ref 21–32)
CO2 SERPL-SCNC: 19 MMOL/L (ref 21–32)
CO2 SERPL-SCNC: 24 MMOL/L (ref 21–32)
CO2 SERPL-SCNC: 25 MMOL/L (ref 21–32)
CREAT SERPL-MCNC: 1.2 MG/DL (ref 0.7–1.3)
CREAT SERPL-MCNC: 1.34 MG/DL (ref 0.7–1.3)
CREAT SERPL-MCNC: 1.5 MG/DL (ref 0.7–1.3)
CREAT SERPL-MCNC: 1.54 MG/DL (ref 0.7–1.3)
DIFFERENTIAL METHOD BLD: ABNORMAL
EOSINOPHIL # BLD: 0 K/UL (ref 0–0.4)
EOSINOPHIL NFR BLD: 0 % (ref 0–7)
ERYTHROCYTE [DISTWIDTH] IN BLOOD BY AUTOMATED COUNT: 14.6 % (ref 11.5–14.5)
EST. AVERAGE GLUCOSE BLD GHB EST-MCNC: 346 MG/DL
GLOBULIN SER CALC-MCNC: 3 G/DL (ref 2–4)
GLUCOSE BLD STRIP.AUTO-MCNC: 107 MG/DL (ref 65–100)
GLUCOSE BLD STRIP.AUTO-MCNC: 120 MG/DL (ref 65–100)
GLUCOSE BLD STRIP.AUTO-MCNC: 138 MG/DL (ref 65–100)
GLUCOSE BLD STRIP.AUTO-MCNC: 168 MG/DL (ref 65–100)
GLUCOSE BLD STRIP.AUTO-MCNC: 184 MG/DL (ref 65–100)
GLUCOSE BLD STRIP.AUTO-MCNC: 192 MG/DL (ref 65–100)
GLUCOSE BLD STRIP.AUTO-MCNC: 194 MG/DL (ref 65–100)
GLUCOSE BLD STRIP.AUTO-MCNC: 201 MG/DL (ref 65–100)
GLUCOSE BLD STRIP.AUTO-MCNC: 206 MG/DL (ref 65–100)
GLUCOSE BLD STRIP.AUTO-MCNC: 222 MG/DL (ref 65–100)
GLUCOSE BLD STRIP.AUTO-MCNC: 225 MG/DL (ref 65–100)
GLUCOSE BLD STRIP.AUTO-MCNC: 231 MG/DL (ref 65–100)
GLUCOSE BLD STRIP.AUTO-MCNC: 236 MG/DL (ref 65–100)
GLUCOSE BLD STRIP.AUTO-MCNC: 241 MG/DL (ref 65–100)
GLUCOSE BLD STRIP.AUTO-MCNC: 249 MG/DL (ref 65–100)
GLUCOSE BLD STRIP.AUTO-MCNC: 255 MG/DL (ref 65–100)
GLUCOSE SERPL-MCNC: 158 MG/DL (ref 65–100)
GLUCOSE SERPL-MCNC: 233 MG/DL (ref 65–100)
GLUCOSE SERPL-MCNC: 248 MG/DL (ref 65–100)
GLUCOSE SERPL-MCNC: 248 MG/DL (ref 65–100)
HBA1C MFR BLD: 13.7 % (ref 4–5.6)
HCT VFR BLD AUTO: 42.5 % (ref 36.6–50.3)
HGB BLD-MCNC: 14.4 G/DL (ref 12.1–17)
IMM GRANULOCYTES # BLD AUTO: 0.3 K/UL (ref 0–0.04)
IMM GRANULOCYTES NFR BLD AUTO: 2 % (ref 0–0.5)
LACTATE SERPL-SCNC: 3.6 MMOL/L (ref 0.4–2)
LIPASE SERPL-CCNC: 1268 U/L (ref 73–393)
LYMPHOCYTES # BLD: 3.3 K/UL (ref 0.8–3.5)
LYMPHOCYTES NFR BLD: 18 % (ref 12–49)
MAGNESIUM SERPL-MCNC: 2.3 MG/DL (ref 1.6–2.4)
MAGNESIUM SERPL-MCNC: 2.4 MG/DL (ref 1.6–2.4)
MAGNESIUM SERPL-MCNC: 2.5 MG/DL (ref 1.6–2.4)
MCH RBC QN AUTO: 32.1 PG (ref 26–34)
MCHC RBC AUTO-ENTMCNC: 33.9 G/DL (ref 30–36.5)
MCV RBC AUTO: 94.9 FL (ref 80–99)
MONOCYTES # BLD: 1.8 K/UL (ref 0–1)
MONOCYTES NFR BLD: 10 % (ref 5–13)
NEUTS SEG # BLD: 12.4 K/UL (ref 1.8–8)
NEUTS SEG NFR BLD: 70 % (ref 32–75)
NRBC # BLD: 0 K/UL (ref 0–0.01)
NRBC BLD-RTO: 0 PER 100 WBC
PHOSPHATE SERPL-MCNC: 2.1 MG/DL (ref 2.6–4.7)
PLATELET # BLD AUTO: 482 K/UL (ref 150–400)
PMV BLD AUTO: 10.2 FL (ref 8.9–12.9)
POTASSIUM SERPL-SCNC: 3.7 MMOL/L (ref 3.5–5.1)
POTASSIUM SERPL-SCNC: 3.7 MMOL/L (ref 3.5–5.1)
POTASSIUM SERPL-SCNC: 3.9 MMOL/L (ref 3.5–5.1)
POTASSIUM SERPL-SCNC: 4.3 MMOL/L (ref 3.5–5.1)
PROT SERPL-MCNC: 5.5 G/DL (ref 6.4–8.2)
RBC # BLD AUTO: 4.48 M/UL (ref 4.1–5.7)
SERVICE CMNT-IMP: ABNORMAL
SODIUM SERPL-SCNC: 143 MMOL/L (ref 136–145)
SODIUM SERPL-SCNC: 144 MMOL/L (ref 136–145)
SODIUM SERPL-SCNC: 145 MMOL/L (ref 136–145)
SODIUM SERPL-SCNC: 146 MMOL/L (ref 136–145)
TSH SERPL DL<=0.05 MIU/L-ACNC: 1.25 UIU/ML (ref 0.36–3.74)
WBC # BLD AUTO: 17.9 K/UL (ref 4.1–11.1)

## 2020-01-03 PROCEDURE — 82962 GLUCOSE BLOOD TEST: CPT

## 2020-01-03 PROCEDURE — 83735 ASSAY OF MAGNESIUM: CPT

## 2020-01-03 PROCEDURE — 83605 ASSAY OF LACTIC ACID: CPT

## 2020-01-03 PROCEDURE — 82550 ASSAY OF CK (CPK): CPT

## 2020-01-03 PROCEDURE — 83690 ASSAY OF LIPASE: CPT

## 2020-01-03 PROCEDURE — 84100 ASSAY OF PHOSPHORUS: CPT

## 2020-01-03 PROCEDURE — 87040 BLOOD CULTURE FOR BACTERIA: CPT

## 2020-01-03 PROCEDURE — 74011000258 HC RX REV CODE- 258: Performed by: HOSPITALIST

## 2020-01-03 PROCEDURE — 74011250636 HC RX REV CODE- 250/636: Performed by: INTERNAL MEDICINE

## 2020-01-03 PROCEDURE — 74011636637 HC RX REV CODE- 636/637: Performed by: INTERNAL MEDICINE

## 2020-01-03 PROCEDURE — 36415 COLL VENOUS BLD VENIPUNCTURE: CPT

## 2020-01-03 PROCEDURE — 74178 CT ABD&PLV WO CNTR FLWD CNTR: CPT

## 2020-01-03 PROCEDURE — 80048 BASIC METABOLIC PNL TOTAL CA: CPT

## 2020-01-03 PROCEDURE — 74011636320 HC RX REV CODE- 636/320: Performed by: INTERNAL MEDICINE

## 2020-01-03 PROCEDURE — 74011000258 HC RX REV CODE- 258: Performed by: INTERNAL MEDICINE

## 2020-01-03 PROCEDURE — 84443 ASSAY THYROID STIM HORMONE: CPT

## 2020-01-03 PROCEDURE — 65660000000 HC RM CCU STEPDOWN

## 2020-01-03 PROCEDURE — 80053 COMPREHEN METABOLIC PANEL: CPT

## 2020-01-03 PROCEDURE — 83036 HEMOGLOBIN GLYCOSYLATED A1C: CPT

## 2020-01-03 PROCEDURE — 85025 COMPLETE CBC W/AUTO DIFF WBC: CPT

## 2020-01-03 PROCEDURE — 74011000250 HC RX REV CODE- 250: Performed by: INTERNAL MEDICINE

## 2020-01-03 RX ORDER — SODIUM CHLORIDE 0.9 % (FLUSH) 0.9 %
10 SYRINGE (ML) INJECTION
Status: COMPLETED | OUTPATIENT
Start: 2020-01-03 | End: 2020-01-03

## 2020-01-03 RX ORDER — INSULIN GLARGINE 100 [IU]/ML
20 INJECTION, SOLUTION SUBCUTANEOUS ONCE
Status: COMPLETED | OUTPATIENT
Start: 2020-01-03 | End: 2020-01-03

## 2020-01-03 RX ORDER — DEXTROSE MONOHYDRATE AND SODIUM CHLORIDE 5; .45 G/100ML; G/100ML
125 INJECTION, SOLUTION INTRAVENOUS CONTINUOUS
Status: DISCONTINUED | OUTPATIENT
Start: 2020-01-03 | End: 2020-01-03

## 2020-01-03 RX ORDER — SODIUM CHLORIDE 0.9 % (FLUSH) 0.9 %
SYRINGE (ML) INJECTION
Status: COMPLETED
Start: 2020-01-03 | End: 2020-01-03

## 2020-01-03 RX ORDER — POTASSIUM CHLORIDE 7.45 MG/ML
10 INJECTION INTRAVENOUS
Status: DISPENSED | OUTPATIENT
Start: 2020-01-03 | End: 2020-01-03

## 2020-01-03 RX ORDER — LABETALOL HYDROCHLORIDE 5 MG/ML
10 INJECTION, SOLUTION INTRAVENOUS ONCE
Status: ACTIVE | OUTPATIENT
Start: 2020-01-03 | End: 2020-01-03

## 2020-01-03 RX ORDER — SODIUM CHLORIDE 450 MG/100ML
75 INJECTION, SOLUTION INTRAVENOUS CONTINUOUS
Status: DISCONTINUED | OUTPATIENT
Start: 2020-01-03 | End: 2020-01-05 | Stop reason: HOSPADM

## 2020-01-03 RX ADMIN — ONDANSETRON HYDROCHLORIDE 4 MG: 2 INJECTION, SOLUTION INTRAMUSCULAR; INTRAVENOUS at 05:08

## 2020-01-03 RX ADMIN — FAMOTIDINE 20 MG: 10 INJECTION, SOLUTION INTRAVENOUS at 09:00

## 2020-01-03 RX ADMIN — IOPAMIDOL 100 ML: 755 INJECTION, SOLUTION INTRAVENOUS at 14:14

## 2020-01-03 RX ADMIN — INSULIN GLARGINE 20 UNITS: 100 INJECTION, SOLUTION SUBCUTANEOUS at 17:16

## 2020-01-03 RX ADMIN — Medication 10 ML: at 12:06

## 2020-01-03 RX ADMIN — SODIUM CHLORIDE 1.3 UNITS/HR: 9 INJECTION, SOLUTION INTRAVENOUS at 04:44

## 2020-01-03 RX ADMIN — SODIUM CHLORIDE 1.7 UNITS/HR: 9 INJECTION, SOLUTION INTRAVENOUS at 05:55

## 2020-01-03 RX ADMIN — SODIUM CHLORIDE 1.4 UNITS/HR: 9 INJECTION, SOLUTION INTRAVENOUS at 01:25

## 2020-01-03 RX ADMIN — FAMOTIDINE 20 MG: 10 INJECTION, SOLUTION INTRAVENOUS at 19:41

## 2020-01-03 RX ADMIN — DEXTROSE MONOHYDRATE AND SODIUM CHLORIDE 125 ML/HR: 5; .45 INJECTION, SOLUTION INTRAVENOUS at 10:24

## 2020-01-03 RX ADMIN — Medication 10 ML: at 14:14

## 2020-01-03 RX ADMIN — SODIUM CHLORIDE 0.8 UNITS/HR: 9 INJECTION, SOLUTION INTRAVENOUS at 03:16

## 2020-01-03 RX ADMIN — HEPARIN SODIUM 5000 UNITS: 5000 INJECTION INTRAVENOUS; SUBCUTANEOUS at 19:45

## 2020-01-03 RX ADMIN — ONDANSETRON HYDROCHLORIDE 4 MG: 2 INJECTION, SOLUTION INTRAMUSCULAR; INTRAVENOUS at 12:07

## 2020-01-03 RX ADMIN — DEXTROSE MONOHYDRATE AND SODIUM CHLORIDE 125 ML/HR: 5; .45 INJECTION, SOLUTION INTRAVENOUS at 01:44

## 2020-01-03 RX ADMIN — SODIUM CHLORIDE 1.2 UNITS/HR: 9 INJECTION, SOLUTION INTRAVENOUS at 02:13

## 2020-01-03 RX ADMIN — HEPARIN SODIUM 5000 UNITS: 5000 INJECTION INTRAVENOUS; SUBCUTANEOUS at 09:00

## 2020-01-03 RX ADMIN — SODIUM CHLORIDE 100 ML/HR: 450 INJECTION, SOLUTION INTRAVENOUS at 18:45

## 2020-01-03 RX ADMIN — ONDANSETRON HYDROCHLORIDE 4 MG: 2 INJECTION, SOLUTION INTRAMUSCULAR; INTRAVENOUS at 19:37

## 2020-01-03 RX ADMIN — SODIUM CHLORIDE 1.6 UNITS/HR: 9 INJECTION, SOLUTION INTRAVENOUS at 07:07

## 2020-01-03 NOTE — ED NOTES
Pt has been stuck total of 15 times and is refusing any further sticks. Unable to obtain lab specimen. Hospitalist made aware, no further orders.

## 2020-01-03 NOTE — PROGRESS NOTES
Pharmacy Clarification of Prior to Admission Medication Regimen     The patient was interviewed regarding clarification of the prior to admission medication regimen and was questioned regarding use of any other inhalers, topical products, over the counter medications, herbal medications, vitamin products or ophthalmic/nasal/otic medication use. Information Obtained From: RX Query,Patient    Pertinent Pharmacy Findings:  Per patient,he is supposed to be taking Gabapentin, Pantoprazole and Tamsulosin but he can not afford them so he is not taking them. While MHT was in room patient took a vial of insulin and and a syringe from his pocket-MHT notified nurse. PTA medication list was corrected to the following:     Prior to Admission Medications   Prescriptions Last Dose Informant Taking?   insulin glargine (LANTUS,BASAGLAR) 100 unit/mL (3 mL) inpn 2020 at Unknown time Self Yes   Sig: Inject 42 unit sq daily. insulin regular (NOVOLIN R) 100 unit/mL injection 2020 at Unknown time Self Yes   Si-10 Units by SubCUTAneous route Before breakfast, lunch, and dinner.  Blood Glucose (mg/dL)       Insulin Dose (units)              150-200                               2               201-250                               4               251-300                               6               301-350                               8               >351                                   10      Facility-Administered Medications: None          Thank you,  Lemuel Xie  Medication History Pharmacy Technician

## 2020-01-03 NOTE — ED NOTES
TRANSFER - OUT REPORT:    Verbal report given to Addie Freeman Marrow  being transferred to PCU(unit) for routine progression of care       Report consisted of patients Situation, Background, Assessment and   Recommendations(SBAR). Information from the following report(s) SBAR, Kardex, ED Summary, Intake/Output, MAR and Recent Results was reviewed with the receiving nurse. Lines:   Peripheral IV 01/02/20 Left Arm (Active)   Site Assessment Clean, dry, & intact 1/2/2020  5:58 PM   Phlebitis Assessment 0 1/2/2020  5:58 PM   Infiltration Assessment 0 1/2/2020  5:58 PM   Dressing Status Clean, dry, & intact 1/2/2020  5:58 PM   Dressing Type Transparent 1/2/2020  5:58 PM   Hub Color/Line Status Pink;Capped;Flushed;Patent 1/2/2020  5:58 PM   Action Taken Blood drawn 1/2/2020  5:58 PM   Alcohol Cap Used No 1/2/2020  5:58 PM       Peripheral IV 01/02/20 Right Forearm (Active)   Site Assessment Clean, dry, & intact 1/2/2020  5:45 PM   Phlebitis Assessment 0 1/2/2020  5:45 PM   Infiltration Assessment 0 1/2/2020  5:45 PM   Dressing Type Transparent 1/2/2020  5:45 PM   Hub Color/Line Status Pink 1/2/2020  5:45 PM   Action Taken Blood drawn 1/2/2020  5:45 PM        Opportunity for questions and clarification was provided.       Patient transported with:   Monitor  Registered Nurse  Tech

## 2020-01-03 NOTE — PROGRESS NOTES
MD paged re: inability to draw labs. Will update on  VS and patient status when call returned.     MD notified of elevated BP.[id number]

## 2020-01-03 NOTE — PROGRESS NOTES
1145 - Bedside and Verbal shift change report given to anny perry (oncoming nurse) by Manish Rojas (offgoing nurse). Report included the following information SBAR, Kardex, Intake/Output, MAR, Recent Results and Cardiac Rhythm ST.  1415 - patient refusing hansen catheter. MD called and informed. 1625 - patient refusing IV potassium. Call to MD and informed. MD will input new orders. MD gave telephone order to switch IV fluids to 1/2 NS at 100cc/hr when insulin gtt is stopped per protocol. 1900 - Bedside and Verbal shift change report given to harinder (oncoming nurse) by Lorena Farfan (offgoing nurse). Report included the following information SBAR, Kardex, Intake/Output, MAR, Recent Results and Cardiac Rhythm NSR.

## 2020-01-03 NOTE — PROGRESS NOTES
0700 Bedside report given to Monique Calix RN by Eliecer Schaefer RN. Report included SBAR, ED Report, Labs, and Cardiac Rhythm NSR. Patient in bed resting well. Vitals are stable. 1000 Lab called with critical Lactic 3.6. I paged Dr. Betty Sales. [phone] Paged Dr Betty Sales again to give results of lactic    1040 Dr Betty Sales called back and I provided lactic results. No orders provided as lab trending down.

## 2020-01-03 NOTE — H&P
Hospitalist Admission Note    NAME: Bala Coleman   :  1980   MRN:  [id number]     Date/Time:  2020 7:23 PM    Patient PCP: Yousif Sagastume, CHARLY  ______________________________________________________________________  Given the patient's current clinical presentation, I have a high level of concern for decompensation if discharged from the emergency department. Complex decision making was performed, which includes reviewing the patient's available past medical records, laboratory results, and x-ray films. My assessment of this patient's clinical condition and my plan of care is as follows. Assessment / Plan:  DKA: will start IVF and insulin drip, monitor in Stepdown unit. Monitor BMP. FELECIA: due to severe dehydration, start IVF and check US to r/o obstruction. Hyponatremia: due to DKA, monitor. N/V/Abdominal Pain: due to Ketoacidosis, will check KUB, CXR, U/A.  Leukocytosis: seems reactive, check CXR and U/A. Code Status: Full Code  Surrogate Decision Maker: mother Khadijah  DVT Prophylaxis: Heparin  GI Prophylaxis: H2 blocker  Baseline: independent    Critically ill patient high risk for de-compensation      Subjective:   CHIEF COMPLAINT: \"I have N/V and my belly hurts\"    HISTORY OF PRESENT ILLNESS:     Sandra Isbell is a 42 y.o.  male  insulin-dependent diabetic presenting the emergency department complaining of nausea, vomiting, high blood sugar. Symptoms started on Tuesday. No fever. No known underlying infection. Reports compliance with his insulin. Found his blood sugar to be high today, EMS read over 600. Patient denies any fevers or chills. Admits to cramping abdominal pain, no exacerbating or relieving factors. Denies any change in stool. Denies any rashes, shortness of breath or cough. Denies any illicit drug use, denies any alcohol use.   At this time patient is lying in bed c/o N/V abdominal pain, denies chest pian, no fever, no cough, no urinary symptoms, no other associated symptoms. We were asked to admit for work up and evaluation of the above problems. Past Medical History:   Diagnosis Date    [facility] depressed [facility]     [facility]     Depression     Diabetes ([facility]     D[facility], [facility] 1/27/2013    diagnosed age 23    H/O noncompliance with medical treatment, presenting hazards to health     MRSA (methicillin resistant staph aureus) culture positive     MRSA (methicillin resistant Staphylococcus aureus)     Face    Noncompliance with medication regimen     Second hand smoke exposure     Seizure ([facility]     Seizures ([facility] 2006 or 2007    one episode during jail        Past Surgical History:   Procedure Laterality Date    HX HEENT      top left wisdom tooth    HX ORTHOPAEDIC Left     wrist; [facility]    UPPER GI ENDOSCOPY,BIOPSY  11/20/2018            Social History     Tobacco Use    Smoking status: Current Some Day Smoker     Packs/day: 0.10     Years: 16.00     Pack years: 1.60     Types: Cigarettes    Smokeless tobacco: Never Used   Substance Use Topics    Alcohol use: No        Family History   Problem Relation Age of Onset    Diabetes Mother     Diabetes Other         neice, type 1      No Known Allergies     Prior to Admission medications    Medication Sig Start Date End Date Taking? Authorizing Provider   insulin glargine (LANTUS,BASAGLAR) 100 unit/mL (3 mL) inpn Inject 42 unit sq daily. 11/4/19  Yes Jason Callaway MD   insulin regular (NOVOLIN R) 100 unit/mL injection 2-10 Units by SubCUTAneous route Before breakfast, lunch, and dinner.  Blood Glucose (mg/dL)       Insulin Dose (units)              150-200                               2               201-250                               4               251-300                               6               301-350                               8               >351                                   10   Yes Provider, Historical       REVIEW OF SYSTEMS:     I am not able to complete the review of systems because: The patient is intubated and sedated    The patient has altered mental status due to his acute medical problems    The patient has baseline aphasia from prior stroke(s)    The patient has baseline dementia and is not reliable historian    The patient is in acute medical distress and unable to provide information           Total of 12 systems reviewed as follows:       POSITIVE= underlined text  Negative = text not underlined  General:  fever, chills, sweats, generalized weakness, weight loss/gain,      loss of appetite   Eyes:    blurred vision, eye pain, loss of vision, double vision  ENT:    rhinorrhea, pharyngitis   Respiratory:   cough, sputum production, SOB, [place], wheezing, pleuritic pain   Cardiology:   chest pain, palpitations, orthopnea, PND, edema, syncope   Gastrointestinal:  abdominal pain , N/V, diarrhea, dysphagia, constipation, bleeding   Genitourinary:  frequency, urgency, dysuria, hematuria, incontinence   Muskuloskeletal :  arthralgia, myalgia, back pain  Hematology:  easy bruising, nose or gum bleeding, lymphadenopathy   Dermatological: rash, ulceration, pruritis, color change / jaundice  Endocrine:   hot flashes or polydipsia   Neurological:  headache, dizziness, confusion, focal weakness, paresthesia,     Speech difficulties, memory loss, gait difficulty  Psychological: Feelings of anxiety, depression, agitation    Objective:   VITALS:    Visit Vitals  /73 (BP 1 Location: Left arm)   Pulse (!) 108   Temp 97.8 °F (36.6 °C)   Resp (!) 31   Ht 5' 8\" (1.727 m)   Wt 63.5 kg (140 lb)   SpO2 100%   BMI 21.29 kg/m²       PHYSICAL EXAM:    General:    Alert, cooperative, vomiting, appears stated age.      HEENT: Atraumatic, anicteric sclerae, pink conjunctivae     No oral ulcers, mucosa dry, throat clear, dentition fair  Neck:  Supple, symmetrical,  thyroid: non tender  Lungs:   Coarse BS  Chest wall:  No tenderness  No Accessory muscle use. Heart:   Regular  rhythm,  No  murmur   No edema  Abdomen:   Soft, diffuse tender. Not distended. No rebound  Bowel sounds normal  Extremities: No cyanosis. No clubbing,      Skin turgor normal, Capillary refill normal, Radial pulse 2+  Skin:     Not pale. Not Jaundiced  No rashes   Psych:  Good insight. Not depressed. Not anxious or agitated. Neurologic: EOMs intact. No facial asymmetry. No aphasia or slurred speech. Symmetrical strength, Sensation grossly intact. Alert and oriented X 4.     _______________________________________________________________________  Care Plan discussed with:    Comments   Patient y    Family      RN y    Care Manager                    Consultant:      _______________________________________________________________________  Expected  Disposition:   Home with Family y   HH/PT/OT/RN    SNF/LTC    CATHIE    ________________________________________________________________________  TOTAL TIME: Minutes    Critical Care Provided  61   Minutes non procedure based      Comments    y Reviewed previous records   >50% of visit spent in counseling and coordination of care y Discussion with patient and/or family and questions answered       ________________________________________________________________________  Signed: Abelardo Bo MD    Procedures: see electronic medical records for all procedures/Xrays and details which were not copied into this note but were reviewed prior to creation of Plan.     LAB DATA REVIEWED:    Recent Results (from the past 24 hour(s))   GLUCOSE, POC    Collection Time: 01/02/20  5:08 PM   Result Value Ref Range    Glucose (POC) >600 (HH) 65 - 100 mg/dL    Performed by Bob Penaloza RN (traveler)    METABOLIC PANEL, BASIC    Collection Time: 01/02/20  5:38 PM   Result Value Ref Range    Sodium 131 (L) 136 - 145 mmol/L    Potassium 5.6 (H) 3.5 - 5.1 mmol/L    Chloride 88 (L) 97 - 108 mmol/L    CO2 10 (LL) 21 - 32 mmol/L    Anion gap 33 (H) 5 - 15 mmol/L    Glucose 891 (HH) 65 - 100 mg/dL    BUN 31 (H) 6 - 20 MG/DL    Creatinine 1.64 (H) 0.70 - 1.30 MG/DL    BUN/Creatinine ratio 19 12 - 20      GFR est AA 58 (L) >60 ml/min/1.73m2    GFR est non-AA 48 (L) >60 ml/min/1.73m2    Calcium 9.3 8.5 - 10.1 MG/DL   CBC WITH AUTOMATED DIFF    Collection Time: 01/02/20  5:38 PM   Result Value Ref Range    WBC 14.5 (H) 4.1 - 11.1 K/uL    RBC 4.80 4. 10 - 5.70 M/uL    HGB 15.2 12.1 - 17.0 g/dL    HCT 46.7 36.6 - 50.3 %    MCV 97.3 80.0 - 99.0 FL    MCH 31.7 26.0 - 34.0 PG    MCHC 32.5 30.0 - 36.5 g/dL    RDW 14.1 11.5 - 14.5 %    PLATELET 923 (H) 441 - 400 K/uL    MPV 10.1 8.9 - 12.9 FL    NRBC 0.0 0  WBC    ABSOLUTE NRBC 0.00 0.00 - 0.01 K/uL    NEUTROPHILS 83 (H) 32 - 75 %    LYMPHOCYTES 11 (L) 12 - 49 %    MONOCYTES 4 (L) 5 - 13 %    EOSINOPHILS 1 0 - 7 %    BASOPHILS 0 0 - 1 %    IMMATURE GRANULOCYTES 1 (H) 0.0 - 0.5 %    ABS. NEUTROPHILS 11.9 (H) 1.8 - 8.0 K/UL    ABS. LYMPHOCYTES 1.6 0.8 - 3.5 K/UL    ABS. MONOCYTES 0.6 0.0 - 1.0 K/UL    ABS. EOSINOPHILS 0.1 0.0 - 0.4 K/UL    ABS. BASOPHILS 0.1 0.0 - 0.1 K/UL    ABS. IMM.  GRANS. 0.2 (H) 0.00 - 0.04 K/UL    DF AUTOMATED     PROTHROMBIN TIME + INR    Collection Time: 01/02/20  5:38 PM   Result Value Ref Range    INR 1.0 0.9 - 1.1      Prothrombin time 9.8 9.0 - 11.1 sec   LIPASE    Collection Time: 01/02/20  5:38 PM   Result Value Ref Range    Lipase 242 73 - 393 U/L   MAGNESIUM    Collection Time: 01/02/20  5:38 PM   Result Value Ref Range    Magnesium 2.9 (H) 1.6 - 2.4 mg/dL   PHOSPHORUS    Collection Time: 01/02/20  5:38 PM   Result Value Ref Range    Phosphorus 7.2 (H) 2.6 - 4.7 MG/DL   LACTIC ACID    Collection Time: 01/02/20  5:38 PM   Result Value Ref Range    Lactic acid 4.1 (HH) 0.4 - 2.0 MMOL/L   POC CHEM8    Collection Time: 01/02/20  5:48 PM   Result Value Ref Range    Calcium, ionized (POC) 1.13 1.12 - 1.32 mmol/L    Sodium (POC) 129 (L) 136 - 145 mmol/L    Potassium (POC) 5.7 (H) 3.5 - 5.1 mmol/L Chloride (POC) 98 98 - 107 mmol/L    CO2 (POC) 12 (LL) 21 - 32 mmol/L    Anion gap (POC) 26 (H) 10 - 20 mmol/L    Glucose (POC) >700 (HH) 65 - 100 mg/dL    BUN (POC) 30 (H) 9 - 20 mg/dL    Creatinine (POC) 1.1 0.6 - 1.3 mg/dL    GFRAA, POC >60 >60 ml/min/1.73m2    GFRNA, POC >60 >60 ml/min/1.73m2    Hematocrit (POC) 49 36.6 - 50.3 %    Comment Notified RN or MD immediately by      POC VENOUS BLOOD GAS    Collection Time: 01/02/20  6:16 PM   Result Value Ref Range    Device: ROOM AIR      pH, venous (POC) 7.180 (LL) 7.32 - 7.42      pCO2, venous (POC) 22.6 (L) 41 - 51 MMHG    pO2, venous (POC) 49 (H) 25 - 40 mmHg    HCO3, venous (POC) 8.4 (L) 23.0 - 28.0 MMOL/L    sO2, venous (POC) 76 65 - 88 %    Base deficit, venous (POC) 20 mmol/L    Allens test (POC) N/A      Total resp.  rate 18      Site OTHER      Specimen type (POC) VENOUS BLOOD     GLUCOSE, POC    Collection Time: 01/02/20  6:53 PM   Result Value Ref Range    Glucose (POC) >600 (HH) 65 - 100 mg/dL    Performed by Livia Reeves RN (traveler)

## 2020-01-03 NOTE — CONSULTS
Requesting Provider: Niecy May MD - Reason for Consultation: \"right hydronephrosis\"  Pre-existing [facility] Patient:   No                Patient: Kaela Adler MRN: [id number]  SSN: [id number]    YOB: 1980  Age: 42 y.o. Sex: male     Location: [address] [place] [address]       Code Status: Full Code   PCP: Avery Perez, NP  - [phone]   Emergency Contact:  Primary Emergency Contact: Chloe Medel, Rogelio Phone: [phone]   Race/Voodoo/Language: [facility] / [facility] / [place] [employer]: Payor: Viki Orn / Plan: Phillip Leyden / Product Type: Self Pay /    Prior Admission Data: 11/4/19 MRM 2 [facility] CARE Ludmila Lewis   Hospitalized:  Hospital Day: 2 - Admitted 1/2/2020  4:50 PM   POD # * No surgery found *  by * Surgery not found * - Blood Loss: * No surgery found * * Surgery not found *     CONSULTANTS  IP CONSULT TO UROLOGY   ADMISSION DIAGNOSES    ICD-10-CM ICD-9-CM   1. Diabetic ketoacidosis without coma associated with type 1 diabetes mellitus (HCC) E10.10 250.11   2. FELECIA (acute kidney injury) ([facility] [address] N17.9 584.9   3. Lactic acidosis E87.2 276.2         Assessment/Plan:       1. Right hydronephrosis, seen on CT from 1020, not seen on 1/3 CT  - Monitor kidney function. If cr continues to elevate and urine output decreases, hansen placement will be necessary. Pt refuses catheter at this time, however it was explained the importance of placing catheter if kidney function deteriorates. - CTs reviewed from 10/20 showing severe right hydro and massively distended bladder at that time, a hansen was placed, hydro resolved, per repeat CT.   - Ultrasound from 1/2 showing right hydro. - CT reviewed from 1/3. No apparent hydro. B/L extrarenal pelves and bladder wall thickening seen.    - Pt is likely a non-compliant diabetic which is contributing to bladder dysfunction/bladder wall thickening.   - Pt will need to be seen after DC for bladder scan, possible urodynamic studies, office cystoscopy. Close monitoring of bladder function is important to prevent kidney dysfunction. This was explained to pt and pt agrees to follow-up. CC: Vomiting and High Blood Sugar   HPI: He is a 42 y.o. male admitted on 2020 w/ cc of blood sugar > 600 per EMS, abdominal pain and vomiting. PMHx of diabetes, insulin-dependant. Dx'd and being treated for DKA, FELECIA, ketoacidosis, leukocytosis, hyponatremia. CT from today reviewed, showing extrarenal pelves b/l, thickening of bladder wall. No hydronephrosis. Previous CT reviewed. Severe right hydro on 10/20, repeat CT showing resolved hydro. Ultrasound on  showing right hydro. VSS. WBC 17.9. BUN 29/Cr 1.34, improved from yesterday. UA w/ >1000 ml glucose, 0-5 RBCs, no bacteria. Problem: right hydronephrosis; Location: right kidney;  Severity: mild Timing:unknown, Context: no hydro seen on CT on 1/3; Better/Worse: none, Associated s/s:none     Temp (24hrs), Av °F (36.7 °C), Min:97.4 °F (36.3 °C), Max:98.7 °F (37.1 °C)    Urinary Status: Voiding  Creatinine   Date/Time Value Ref Range Status   2020 02:52 PM 1.34 (H) 0.70 - 1.30 MG/DL Final   2020 08:57 AM 1.50 (H) 0.70 - 1.30 MG/DL Final   2020 04:47 AM 1.20 0.70 - 1.30 MG/DL Final   2020 01:20 AM 1.54 (H) 0.70 - 1.30 MG/DL Final   2020 05:38 PM 1.64 (H) 0.70 - 1.30 MG/DL Final     Current Antimicrobial Therapy (168h ago, onward)    None        Key Anti-Platelet Anticoagulant Meds     The patient is on no antiplatelet meds or anticoagulants.         Diet: DIET NPO With Ice Chips - % Diet Eaten: 0 %     Labs     Lab Results   Component Value Date/Time    Lactic acid 3.6 (HH) 2020 08:57 AM    Lactic acid 4.1 (HH) 2020 05:38 PM    Lactic acid 0.7 10/29/2019 09:07 PM    WBC 17.9 (H) 2020 04:47 AM    HCT 42.5 2020 04:47 AM    PLATELET 946 (H)  04:47 AM    Sodium 143 2020 02:52 PM    Potassium 3.7 2020 02:52 PM    Chloride 108 01/03/2020 02:52 PM    CO2 25 01/03/2020 02:52 PM    BUN 29 (H) 01/03/2020 02:52 PM    Creatinine 1.34 (H) 01/03/2020 02:52 PM    Glucose 248 (H) 01/03/2020 02:52 PM    Calcium 7.8 (L) 01/03/2020 02:52 PM    Magnesium 2.3 01/03/2020 02:52 PM    INR 1.0 01/02/2020 05:38 PM     UA:   Lab Results   Component Value Date/Time    Color YELLOW/STRAW 01/02/2020 08:14 PM    Appearance CLEAR 01/02/2020 08:14 PM    Specific gravity 1.029 01/02/2020 08:14 PM    Specific gravity 1.020 07/06/2019 10:02 PM    pH (UA) 5.5 01/02/2020 08:14 PM    Protein 30 (A) 01/02/2020 08:14 PM    Glucose >1,000 (A) 01/02/2020 08:14 PM    Ketone >80 (A) 01/02/2020 08:14 PM    Bilirubin NEGATIVE  01/02/2020 08:14 PM    Urobilinogen 0.2 01/02/2020 08:14 PM    Nitrites NEGATIVE  01/02/2020 08:14 PM    Leukocyte Esterase NEGATIVE  01/02/2020 08:14 PM    Epithelial cells FEW 01/02/2020 08:14 PM    Bacteria NEGATIVE  01/02/2020 08:14 PM    WBC 0-4 01/02/2020 08:14 PM    RBC 0-5 01/02/2020 08:14 PM     Imaging     Results for orders placed during the hospital encounter of 01/02/20   CT ABD PELV W WO CONT    Narrative EXAM:  CT ABDOMEN PELVIS WITH CONTRAST  INDICATION:  Hydronephrosis. Additional history:  COMPARISON: CT of the abdomen and pelvis, 11/20/2018 and 10/29/2019. Ultrasound  renal, 1/2/2020. [place] TECHNIQUE:   Multislice helical CT was performed from the diaphragm to the symphysis pubis  before and after  intravenous contrast administration. Contiguous 5 mm axial  images were reconstructed and lung and soft tissue windows were generated. Coronal and sagittal reformations were generated. CT dose reduction was achieved through use of a standardized protocol tailored  for this examination and automatic exposure control for dose modulation. [place] FINDINGS:  INCIDENTALLY IMAGED CHEST:  Heart/vessels: Within normal limits. Lungs/Pleura: Within normal limits. .  ABDOMEN:  Liver: Within normal limits.   Gallbladder/Biliary: Within normal limits. Spleen: Within normal limits. Splenule  Pancreas: Within normal limits. Adrenals: Within normal limits. Kidneys: Extrarenal pelves bilaterally. Peritoneum/Mesenteries: Within normal limits. Extraperitoneum: Within normal limits. Gastrointestinal tract: Hiatal hernia. Concentric mural thickening throughout  the large bowel which is relatively decompressed. Normal-appearing appendix. Vascular: Within normal limits. Circumaortic left renal vein  . PELVIS:  Extraperitoneum: Within normal limits. Ureters: Within normal limits. Bladder: The bladder is only partially opacified with contrast material on  delayed phase imaging. There is no visible filling defect. The bladder is  slightly distended with concentric mural thickening  Reproductive System: Within normal limits. .  MSK:   Within normal limits. .    Impression IMPRESSION:   1. There are extrarenal pelves bilaterally. 2. There is some concentric mural thickening of the bladder wall. Recommend  clinical correlation for outlet obstruction and/or cystitis. 3. There is concentric mural thickening in the large bowel which is mostly  decompressed. Consider the possibility of colitis. 4. Incidental findings as above. US Results (most recent):  Results from [place] encounter on 01/02/20   US RETROPERITONEUM COMP    Narrative EXAM:  US RETROPERITONEUM COMP  INDICATION:  r/o obstruction. Additional history:  COMPARISON: Renal ultrasound, 10/31/2019. CT of the abdomen and pelvis,  10/29/2019  . TECHNIQUE:  Real-time sonography of the kidneys, retroperitoneum and bladder was performed  with multiple static images obtained. [place] FINDINGS:  The kidneys have normal echogenicity with no mass or stone. Significant,  right-sided hydronephrosis with the renal pelvis measuring up to 5.3 cm The  right kidney measures cm and the left kidney measures 14.9 cm in length. .  The aorta tapers normally.   The proximal iliac arteries are not well visualized  due to body habitus. The IVC is normal. No retroperitoneal mass is identified. .  The urinary bladder is markedly distended  . Impression IMPRESSION:   1. Significant, right-sided hydronephrosis. 2. Markedly distended bladder. Cultures     All Micro Results     Procedure Component Value Units Date/Time    CULTURE, BLOOD, PERIPHERAL [[id number]] Collected:  01/03/20 0857    Order Status:  Completed Specimen:  Blood Updated:  01/03/20 0925           Past History: (Complete 2+/3 areas)   No Known Allergies   Current Facility-Administered Medications   Medication Dose Route Frequency    dextrose 5 % - 0.45% NaCl infusion  125 mL/hr IntraVENous CONTINUOUS    labetalol (NORMODYNE;TRANDATE) injection 10 mg  10 mg IntraVENous ONCE    potassium chloride 10 mEq in 100 ml IVPB  10 mEq IntraVENous Q1H    insulin regular (NOVOLIN R, HUMULIN R) 100 Units in 0.9% sodium chloride 100 mL infusion  0-50 Units/hr IntraVENous TITRATE    insulin lispro (HUMALOG) injection   SubCUTAneous TIDAC    glucose chewable tablet 16 g  4 Tab Oral PRN    glucagon (GLUCAGEN) injection 1 mg  1 mg IntraMUSCular PRN    dextrose 10% infusion 0-250 mL  0-250 mL IntraVENous PRN    acetaminophen (TYLENOL) tablet 650 mg  650 mg Oral Q4H PRN    ondansetron (ZOFRAN) injection 4 mg  4 mg IntraVENous Q6H PRN    morphine injection 2 mg  2 mg IntraVENous Q4H PRN    famotidine (PF) (PEPCID) 20 mg in 0.9% sodium chloride 10 mL injection  20 mg IntraVENous Q12H    labetalol (NORMODYNE;TRANDATE) injection 10 mg  10 mg IntraVENous Q6H PRN    heparin (porcine) injection 5,000 Units  5,000 Units SubCUTAneous Q12H    Prior to Admission medications    Medication Sig Start Date End Date Taking? Authorizing Provider   insulin glargine (LANTUS,BASAGLAR) 100 unit/mL (3 mL) inpn Inject 42 unit sq daily.  11/4/19  Yes Bakari Ham MD   insulin regular (NOVOLIN R) 100 unit/mL injection 2-10 Units by SubCUTAneous route Before breakfast, lunch, and dinner. Blood Glucose (mg/dL)       Insulin Dose (units)              150-200                               2               201-250                               4               251-300                               6               301-350                               8               >351                                   10   Yes Provider, Historical        PMHx:  has a past medical history of Bipolar 1 disorder, depressed ([facility] [address] Bipolar disorder ([facility] [address] Depression, Diabetes ([facility] [address] DKA, type 1 ([facility] [address] (1/27/2013), H/O noncompliance with medical treatment, presenting hazards to health, MRSA (methicillin resistant staph aureus) culture positive, MRSA (methicillin resistant Staphylococcus aureus), Noncompliance with medication regimen, Second hand smoke exposure, Seizure ([facility] [address] and Seizures ([facility] [address] (2006 or 2007). PSurgHx:  has a past surgical history that includes hx orthopaedic (Left); hx heent; and upper gi endoscopy,biopsy (11/20/2018). PSocHx:  reports that he has been smoking cigarettes. He has a 1.60 pack-year smoking history. He has never used smokeless tobacco. He reports that he does not drink alcohol or use drugs.    ROS:  (Complete - 10 systems) - DENIES: Weightloss (Constitutional), Dry mouth (ENMT), Chest pain (CV), SOB (Respiratory), Constipation (GI), Weakness (MS), Pallor (Skin), TIA Sx (Neuro), Confusion (Psych), Easy bruising (Heme)    Physical Exam: (Comprehesive - 8+ 1995 Systems)     (1) Constitutional:  FIO2:   on SpO2: O2 Sat (%): 100 %  O2 Device: Room air    Patient Vitals for the past 24 hrs:   BP Temp Pulse Resp SpO2 Height Weight   01/03/20 1137 142/79 97.9 °F (36.6 °C) (!) 101 16 100 % -- --   01/03/20 0700 134/87 97.9 °F (36.6 °C) (!) 102 16 100 % -- --   01/03/20 0659 134/87 -- (!) 101 -- 100 % -- --   01/03/20 0619 -- -- -- -- -- -- 59.2 kg (130 lb 8 oz)   01/03/20 0601 (!) 161/93 -- (!) 114 -- 97 % -- --   01/03/20 0501 142/90 98.7 °F (37.1 °C) (!) 103 16 98 % -- 59.2 kg (130 lb 8 oz)   01/03/20 0056 -- -- (!) 127 -- 98 % -- --   01/02/20 2339 -- -- -- -- -- 5' 8\" (1.727 m) 59.3 kg (130 lb 11.2 oz)   01/02/20 2329 98/58 98.7 °F (37.1 °C) (!) 115 18 100 % -- --   01/02/20 2223 90/61 98.4 °F (36.9 °C) (!) 108 20 100 % -- --   01/02/20 2110 106/68 97.8 °F (36.6 °C) (!) 105 19 100 % -- --   01/02/20 2020 132/76 97.8 °F (36.6 °C) (!) 103 (!) 31 100 % -- --   01/02/20 1906 113/73 97.8 °F (36.6 °C) (!) 108 (!) 31 100 % -- --   01/02/20 1803 110/72 -- -- -- -- -- --   01/02/20 1749 110/72 -- -- -- -- -- --   01/02/20 1714 -- -- -- -- 100 % -- --   01/02/20 1700 -- 97.4 °F (36.3 °C) (!) 103 13 100 % 5' 8\" (1.727 m) 63.5 kg (140 lb)       Date 01/02/20 0700 - 01/03/20 0659 01/03/20 0700 - 01/04/20 0659   Shift 6569-6308 1621-8486 24 Hour Total 5392-0929 3721-1275 24 Hour Total   INTAKE   P.O.  250 250 0  0     P.O.  250 250 0  0   I. V.(mL/kg/hr) 1000(1.3) 2707.3(3.8) 3707.3(2.6)        Insulin Volume  82.3 82.3        Volume (dextrose 5 % - 0.45% NaCl infusion)  625 625        Volume (sodium chloride 0.9 % bolus infusion 1,000 mL)  1000 1000        Volume (sodium chloride 0.9 % bolus infusion 1,000 mL) 1000  1000        Volume (sodium chloride 0.9 % bolus infusion 1,000 mL)  1000 1000      Shift Total(mL/kg) 1000(15.7) 2957. 3(50) [id number]. 3(66.9) 0(0)  0(0)   OUTPUT   Urine(mL/kg/hr)  850(1.2) 850(0.6) 1000  1000     Urine Voided    1000     Urine Occurrence(s)    1 x  1 x   Emesis/NG output  380 380        Emesis  380 380      Shift Total(mL/kg)  1230(20.8) 1230(20.8) 1000(16.9)  1000(16.9)   NET 1000 1727.3 2727.3 -1000  -1000   Weight (kg) 63.5 59.2 59.2 59.2 59.2 59.2      (2) ENMT:   dry mucous membranes, normal sinuses   (3) Respiratory:  breathing easily, no distress   (4) GI:  no abdominal masses, tenderness   (5) :   normal   (6) Lymphatic:  no adenopathy, neck supple   (7) Muscloskeletal:  no gross deformity, normal ROM   (8) Skin:  no rash, warm & dry   (9) Neuro: no focal deficits, normal speech      Signed By: Martinez Torres, CHARLY  - January 3, 2020

## 2020-01-03 NOTE — PROGRESS NOTES
Hospitalist Progress Note    NAME: Kirsten Alonso   :  1980   MRN:  [id number]       Assessment / Plan:  DKA:   Gap not closed yet  Continue D5 1/2 NS, with insulin drip  Monitor BMP  Reports adherence to insulin at home. FELECIA: due to severe dehydration, continue IVF  Improving    Right Hydronephrosis  ELICEO with Hydro, and distended bladder. Get CT to rule out Obstruction. Will put a hansen and get Urology opinion  Had a similar problem in past admission    Hyponatremia: due to DKA, monitor. N/V/Abdominal Pain  Elevated Lipase  Continue IVF  Improving  Npo     Leukocytosis: seems reactive, check Bcx      Code Status: Full Code  Surrogate Decision Maker: mother Khadijah  DVT Prophylaxis: Heparin  GI Prophylaxis: H2 blocker  Baseline: independent     Continue Monitoring in sten down unit     Subjective:     Chief Complaint / Reason for Physician Visit  \"has nausea and vomiting, mild improved. \". Discussed with RN events overnight. Review of Systems:  Symptom Y/N Comments  Symptom Y/N Comments   Fever/Chills n   Chest Pain n    Poor Appetite y   Edema n    Cough n   Abdominal Pain y    Sputum n   Joint Pain n    SOB/JOHNSTON    Pruritis/Rash     Nausea/vomit    Tolerating PT/OT     Diarrhea    Tolerating Diet     Constipation    Other       Could NOT obtain due to:      Objective:     VITALS:   Last 24hrs VS reviewed since prior progress note.  Most recent are:  Patient Vitals for the past 24 hrs:   Temp Pulse Resp BP SpO2   20 1137 97.9 °F (36.6 °C) (!) 101 16 142/79 100 %   20 0700 97.9 °F (36.6 °C) (!) 102 16 134/87 100 %   20 0659 -- (!) 101 -- 134/87 100 %   20 0601 -- (!) 114 -- (!) 161/93 97 %   20 0501 98.7 °F (37.1 °C) (!) 103 16 142/90 98 %   20 0056 -- (!) 127 -- -- 98 %   20 2329 98.7 °F (37.1 °C) (!) 115 18 98/58 100 %   20 2223 98.4 °F (36.9 °C) (!) 108 20 90/61 100 %   01/02/20 2110 97.8 °F (36.6 °C) (!) 105 19 106/68 100 %   20 97.8 °F (36.6 °C) (!) 103 (!) 31 132/76 100 %   01/02/20 1906 97.8 °F (36.6 °C) (!) 108 (!) 31 113/73 100 %   01/02/20 1803 -- -- -- 110/72 --   01/02/20 1749 -- -- -- 110/72 --   01/02/20 1714 -- -- -- -- 100 %   01/02/20 1700 97.4 °F (36.3 °C) (!) 103 13 -- 100 %       Intake/Output Summary (Last 24 hours) at 1/3/2020 1330  Last data filed at 1/3/2020 0707  Gross per 24 hour   Intake 3957.34 ml   Output 1230 ml   Net 2727.34 ml        PHYSICAL EXAM:  General: WD, WN. Alert, cooperative, no acute distress    EENT:  EOMI. Anicteric sclerae. MMM  Resp:  CTA bilaterally, no wheezing or rales. No accessory muscle use  CV:  Regular  rhythm,  No edema  GI:  Soft, Non distended, mild epigastric tender  Neurologic:  Alert and oriented X 3, normal speech,   Psych:   Good insight. Not anxious nor agitated  Skin:  No rashes. No jaundice    Reviewed most current lab test results and cultures  YES  Reviewed most current radiology test results   YES  Review and summation of old records today    NO  Reviewed patient's current orders and MAR    YES  PMH/ reviewed - no change compared to H&P  ________________________________________________________________________  Care Plan discussed with:    Comments   Patient x    Family      RN x    Care Manager     Consultant                        Multidiciplinary team rounds were held today with , nursing, pharmacist and clinical coordinator. Patient's plan of care was discussed; medications were reviewed and discharge planning was addressed.      ________________________________________________________________________  Total NON critical care TIME:  38   Minutes    Total CRITICAL CARE TIME Spent:   Minutes non procedure based      Comments   >50% of visit spent in counseling and coordination of care     ________________________________________________________________________  Tatum Small MD     Procedures: see electronic medical records for all procedures/Xrays and details which were not copied into this note but were reviewed prior to creation of Plan. LABS:  I reviewed today's most current labs and imaging studies.   Pertinent labs include:  Recent Labs     01/03/20 0447 01/02/20  1738   WBC 17.9* 14.5*   HGB 14.4 15.2   HCT 42.5 46.7   * 494*     Recent Labs     01/03/20  0857 01/03/20 0447 01/03/20  0120 01/02/20  1738    144 146* 131*   K 3.9 4.3 3.7 5.6*   * 110* 111* 88*   CO2 24 16* 19* 10*   * 233* 158* 891*   BUN 34* 35* 33* 31*   CREA 1.50* 1.20 1.54* 1.64*   CA 7.8* 8.3* 8.0* 9.3   MG  --  2.4 2.5* 2.9*   PHOS  --   --  2.1* 7.2*   ALB  --  2.5*  --   --    TBILI  --  0.3  --   --    SGOT  --  19  --   --    ALT  --  78  --   --    INR  --   --   --  1.0       Signed: Ruben Moore MD

## 2020-01-03 NOTE — DIABETES MGMT
[facility]    [facility] Consult Note    Recommendations/ Comments: Recommend transition to home basal insulin with 2 hour overlap once anion gap less than 12 x 2. Consider addition of prandial humalog 4 units ac tid if pt eating. Recommend addition of prandial insulin to discharge regimen. Current hospital DM medication: insulin gtt    Met with pt. Pt was seen 19 during his last admission. At that time he reported struggling with depression. He was missing insulin doses frequently- insulin possibly , not monitoring his BG. Today he reports he continues to struggle with depression. He has not sought out treatment and does not have a behavioral health MD.  He reports, however, he was doing better with his insulin. He denies missing any insulin doses recently and reports he has new vials of insulin. He reports monitoring up to 6x/day with initially \"good\" BG, but 300s-400s more recently. Discussed his insulin regimen. He denies ever taking prandial insulin. Discussed action of prandial vs correctional insulin and need for prandial insulin to aid in controlling BG. He is uncertain what precipitated the DKA. He reports beginning to vomit and is unsure if he started with a GI virus. He does not know how to test for ketones. Reviewed ketone testing and enc him to purchase ketostix to have on hand. Also provided pt with sick day guidelines. Consult received for:  []             Assessment of home management                []      Medication Recommendations                []             Meter/monitoring     []             Insulin instruction     []             New diagnosis     []             Outpatient education     []             Insulin pump patient     [x]             Insulin infusion/ glucostabilizer     [x]             DKA/HHS    Chart reviewed and initial evaluation complete on  [facility].     Patient is a 42 y.o. male with known Type 1 Diabetes on lantus 42 units daily and regular 2:50>150 at home. Provided patient with the following: [x]             Diabetes Self Care Guide               [x]             Insulin education materials               [x]             sick day guidelines               [x]             Outpatient DTC contact number               []             Glucometer               Patient was able to give return demonstration of    []       Glucometer    []       saline injection      with []     without []       assistance needed. []       Nurse to have patient self inject prior to discharge. Discussed with patient and/or family need for follow up appointment for diabetes management after discharge. A1c:   Lab Results   Component Value Date/Time    Hemoglobin A1c 13.7 (H) 01/03/2020 04:47 AM       Recent Glucose Results:   Lab Results   Component Value Date/Time     (H) 01/03/2020 08:57 AM     (H) 01/03/2020 04:47 AM     (H) 01/03/2020 01:20 AM    GLUCPOC 241 (H) 01/03/2020 09:08 AM    GLUCPOC 225 (H) 01/03/2020 08:00 AM    GLUCPOC 222 (H) 01/03/2020 07:04 AM        Lab Results   Component Value Date/Time    Creatinine 1.50 (H) 01/03/2020 08:57 AM     Estimated Creatinine Clearance: 56.5 mL/min (A) (based on SCr of 1.5 mg/dL (H)). Active Orders   Diet    DIET NPO With Ice Chips        PO intake:   Patient Vitals for the past 72 hrs:   % Diet Eaten   01/03/20 0707 0 %       Will continue to follow as needed. Thank you.

## 2020-01-04 LAB
ANION GAP SERPL CALC-SCNC: 7 MMOL/L (ref 5–15)
BASOPHILS # BLD: 0 K/UL (ref 0–0.1)
BASOPHILS NFR BLD: 0 % (ref 0–1)
BUN SERPL-MCNC: 19 MG/DL (ref 6–20)
BUN/CREAT SERPL: 22 (ref 12–20)
CALCIUM SERPL-MCNC: 7.6 MG/DL (ref 8.5–10.1)
CHLORIDE SERPL-SCNC: 107 MMOL/L (ref 97–108)
CO2 SERPL-SCNC: 27 MMOL/L (ref 21–32)
COMMENT, HOLDF: NORMAL
CREAT SERPL-MCNC: 0.85 MG/DL (ref 0.7–1.3)
DIFFERENTIAL METHOD BLD: ABNORMAL
EOSINOPHIL # BLD: 0.1 K/UL (ref 0–0.4)
EOSINOPHIL NFR BLD: 1 % (ref 0–7)
ERYTHROCYTE [DISTWIDTH] IN BLOOD BY AUTOMATED COUNT: 14 % (ref 11.5–14.5)
GLUCOSE BLD STRIP.AUTO-MCNC: 122 MG/DL (ref 65–100)
GLUCOSE BLD STRIP.AUTO-MCNC: 141 MG/DL (ref 65–100)
GLUCOSE BLD STRIP.AUTO-MCNC: 155 MG/DL (ref 65–100)
GLUCOSE BLD STRIP.AUTO-MCNC: 243 MG/DL (ref 65–100)
GLUCOSE SERPL-MCNC: 150 MG/DL (ref 65–100)
HCT VFR BLD AUTO: 35.8 % (ref 36.6–50.3)
HGB BLD-MCNC: 12.1 G/DL (ref 12.1–17)
IMM GRANULOCYTES # BLD AUTO: 0 K/UL (ref 0–0.04)
IMM GRANULOCYTES NFR BLD AUTO: 1 % (ref 0–0.5)
LIPASE SERPL-CCNC: 969 U/L (ref 73–393)
LYMPHOCYTES # BLD: 2 K/UL (ref 0.8–3.5)
LYMPHOCYTES NFR BLD: 22 % (ref 12–49)
MAGNESIUM SERPL-MCNC: 2.3 MG/DL (ref 1.6–2.4)
MCH RBC QN AUTO: 31.4 PG (ref 26–34)
MCHC RBC AUTO-ENTMCNC: 33.8 G/DL (ref 30–36.5)
MCV RBC AUTO: 93 FL (ref 80–99)
MONOCYTES # BLD: 0.6 K/UL (ref 0–1)
MONOCYTES NFR BLD: 7 % (ref 5–13)
NEUTS SEG # BLD: 6.1 K/UL (ref 1.8–8)
NEUTS SEG NFR BLD: 69 % (ref 32–75)
NRBC # BLD: 0 K/UL (ref 0–0.01)
NRBC BLD-RTO: 0 PER 100 WBC
PLATELET # BLD AUTO: 334 K/UL (ref 150–400)
PMV BLD AUTO: 9.4 FL (ref 8.9–12.9)
POTASSIUM SERPL-SCNC: 4.1 MMOL/L (ref 3.5–5.1)
RBC # BLD AUTO: 3.85 M/UL (ref 4.1–5.7)
SAMPLES BEING HELD,HOLD: NORMAL
SERVICE CMNT-IMP: ABNORMAL
SODIUM SERPL-SCNC: 141 MMOL/L (ref 136–145)
WBC # BLD AUTO: 8.8 K/UL (ref 4.1–11.1)

## 2020-01-04 PROCEDURE — 83735 ASSAY OF MAGNESIUM: CPT

## 2020-01-04 PROCEDURE — 65270000029 HC RM PRIVATE

## 2020-01-04 PROCEDURE — 74011250636 HC RX REV CODE- 250/636: Performed by: INTERNAL MEDICINE

## 2020-01-04 PROCEDURE — 74011250637 HC RX REV CODE- 250/637: Performed by: INTERNAL MEDICINE

## 2020-01-04 PROCEDURE — 83690 ASSAY OF LIPASE: CPT

## 2020-01-04 PROCEDURE — 74011636637 HC RX REV CODE- 636/637: Performed by: INTERNAL MEDICINE

## 2020-01-04 PROCEDURE — 36415 COLL VENOUS BLD VENIPUNCTURE: CPT

## 2020-01-04 PROCEDURE — 74011000258 HC RX REV CODE- 258: Performed by: INTERNAL MEDICINE

## 2020-01-04 PROCEDURE — 80048 BASIC METABOLIC PNL TOTAL CA: CPT

## 2020-01-04 PROCEDURE — 82962 GLUCOSE BLOOD TEST: CPT

## 2020-01-04 PROCEDURE — 74011000250 HC RX REV CODE- 250: Performed by: INTERNAL MEDICINE

## 2020-01-04 PROCEDURE — 85025 COMPLETE CBC W/AUTO DIFF WBC: CPT

## 2020-01-04 RX ORDER — INSULIN GLARGINE 100 [IU]/ML
20 INJECTION, SOLUTION SUBCUTANEOUS DAILY
Status: DISCONTINUED | OUTPATIENT
Start: 2020-01-04 | End: 2020-01-05 | Stop reason: HOSPADM

## 2020-01-04 RX ORDER — TAMSULOSIN HYDROCHLORIDE 0.4 MG/1
0.4 CAPSULE ORAL DAILY
Status: DISCONTINUED | OUTPATIENT
Start: 2020-01-04 | End: 2020-01-05 | Stop reason: HOSPADM

## 2020-01-04 RX ORDER — INSULIN LISPRO 100 [IU]/ML
INJECTION, SOLUTION INTRAVENOUS; SUBCUTANEOUS
Status: DISCONTINUED | OUTPATIENT
Start: 2020-01-04 | End: 2020-01-05 | Stop reason: HOSPADM

## 2020-01-04 RX ORDER — DEXTROSE MONOHYDRATE 25 G/50ML
12.5-25 INJECTION, SOLUTION INTRAVENOUS AS NEEDED
Status: DISCONTINUED | OUTPATIENT
Start: 2020-01-04 | End: 2020-01-05 | Stop reason: HOSPADM

## 2020-01-04 RX ORDER — MAGNESIUM SULFATE 100 %
4 CRYSTALS MISCELLANEOUS AS NEEDED
Status: DISCONTINUED | OUTPATIENT
Start: 2020-01-04 | End: 2020-01-05 | Stop reason: HOSPADM

## 2020-01-04 RX ADMIN — INSULIN GLARGINE 20 UNITS: 100 INJECTION, SOLUTION SUBCUTANEOUS at 11:00

## 2020-01-04 RX ADMIN — HEPARIN SODIUM 5000 UNITS: 5000 INJECTION INTRAVENOUS; SUBCUTANEOUS at 20:12

## 2020-01-04 RX ADMIN — SODIUM CHLORIDE 75 ML/HR: 450 INJECTION, SOLUTION INTRAVENOUS at 19:05

## 2020-01-04 RX ADMIN — ACETAMINOPHEN 650 MG: 325 TABLET ORAL at 12:05

## 2020-01-04 RX ADMIN — SODIUM CHLORIDE 100 ML/HR: 450 INJECTION, SOLUTION INTRAVENOUS at 00:46

## 2020-01-04 RX ADMIN — FAMOTIDINE 20 MG: 10 INJECTION, SOLUTION INTRAVENOUS at 20:13

## 2020-01-04 RX ADMIN — TAMSULOSIN HYDROCHLORIDE 0.4 MG: 0.4 CAPSULE ORAL at 11:00

## 2020-01-04 RX ADMIN — SODIUM CHLORIDE 100 ML/HR: 450 INJECTION, SOLUTION INTRAVENOUS at 06:16

## 2020-01-04 RX ADMIN — ONDANSETRON HYDROCHLORIDE 4 MG: 2 INJECTION, SOLUTION INTRAMUSCULAR; INTRAVENOUS at 03:55

## 2020-01-04 RX ADMIN — ACETAMINOPHEN 650 MG: 325 TABLET ORAL at 19:11

## 2020-01-04 RX ADMIN — HEPARIN SODIUM 5000 UNITS: 5000 INJECTION INTRAVENOUS; SUBCUTANEOUS at 09:24

## 2020-01-04 RX ADMIN — FAMOTIDINE 20 MG: 10 INJECTION, SOLUTION INTRAVENOUS at 09:24

## 2020-01-04 RX ADMIN — INSULIN LISPRO 1 UNITS: 100 INJECTION, SOLUTION INTRAVENOUS; SUBCUTANEOUS at 21:33

## 2020-01-04 NOTE — PROGRESS NOTES
TRANSFER - IN REPORT:    Verbal report received from [facility]) on Lieutenant Walker  being received from PCU(unit) for routine progression of care      Report consisted of patients Situation, Background, Assessment and   Recommendations(SBAR). Information from the following report(s) SBAR, Kardex, Intake/Output, MAR and Recent Results was reviewed with the receiving nurse. Opportunity for questions and clarification was provided. Assessment completed upon patients arrival to unit and care assumed.

## 2020-01-04 NOTE — PROGRESS NOTES
Hospitalist Progress Note    NAME: Lieutenant Walker   :  1980   MRN:  [id number]       Assessment / Plan:  DKA: Resolved  Gap not closed yet  Continue D5 1/2 NS, with insulin drip  Monitor BMP  Reports adherence to insulin at home., but its unlikely with his past history    FELECIA: resolved   due to severe dehydration, continue IVF  Improving    Right Hydronephrosis, resolved  Concentric Mural thickening of Bladder wall  ELICEO with Hydro, and distended bladder. CT with resolved Hydronephrosis  Denied Beard, and no need for Beard now  Will place on flomax  Follow up OP with Urology    Large bowel Concentric Thickening of large bowel  -?2/2 dehydration, Colitis  Denies abd pain, diarrhea  Will give GI follow up for colonoscopy  Discussed with Pt    Hyponatremia: resolved    N/V/Abdominal Pain- resolved  Elevated Lipase, improving  Continue IVF  Improving  Clear liquid diet    Leukocytosis: seems reactive, imrpved      Code Status: Full Code  Surrogate Decision Maker: mother Khadijah  DVT Prophylaxis: Heparin  GI Prophylaxis: H2 blocker  Baseline: independent     Tx to medical floor. Hopefully home tomorrow     Subjective:     Chief Complaint / Reason for Physician Visit  No more Nausea or vomiting. Wants food. No abd pain, no diarrhea    Review of Systems:  Symptom Y/N Comments  Symptom Y/N Comments   Fever/Chills n   Chest Pain n    Poor Appetite n   Edema n    Cough n   Abdominal Pain y    Sputum n   Joint Pain n    SOB/JOHNSTON    Pruritis/Rash     Nausea/vomit    Tolerating PT/OT     Diarrhea    Tolerating Diet     Constipation    Other       Could NOT obtain due to:      Objective:     VITALS:   Last 24hrs VS reviewed since prior progress note.  Most recent are:  Patient Vitals for the past 24 hrs:   Temp Pulse Resp BP SpO2   20 0747 98.2 °F (36.8 °C) 92 16 154/65 98 %   20 0325 98.1 °F (36.7 °C) 94 17 (!) 162/94 99 %   20 2335 98 °F (36.7 °C) 86 16 141/79 99 %   20 1947 98.1 °F (36.7 °C) 99 17 151/75 100 %   01/03/20 1556 98 °F (36.7 °C) 98 16 (!) 145/91 100 %   01/03/20 1137 97.9 °F (36.6 °C) (!) 101 16 142/79 100 %       Intake/Output Summary (Last 24 hours) at 1/4/2020 0905  Last data filed at 1/4/2020 0504  Gross per 24 hour   Intake 1574.35 ml   Output 1700 ml   Net -125.65 ml        PHYSICAL EXAM:  General: WD, WN. Alert, cooperative, no acute distress    EENT:  EOMI. Anicteric sclerae. MMM  Resp:  CTA bilaterally, no wheezing or rales. No accessory muscle use  CV:  Regular  rhythm,  No edema  GI:  Soft, Non distended, non tender  Neurologic:  Alert and oriented X 3, normal speech,   Psych:   Good insight. Not anxious nor agitated  Skin:  No rashes. No jaundice    Reviewed most current lab test results and cultures  YES  Reviewed most current radiology test results   YES  Review and summation of old records today    NO  Reviewed patient's current orders and MAR    YES  PMH/SH reviewed - no change compared to H&P  ________________________________________________________________________  Care Plan discussed with:    Comments   Patient x    Family      RN x    Care Manager     Consultant                        Multidiciplinary team rounds were held today with , nursing, pharmacist and clinical coordinator. Patient's plan of care was discussed; medications were reviewed and discharge planning was addressed. ________________________________________________________________________  Total NON critical care TIME:  23  Minutes    Total CRITICAL CARE TIME Spent:   Minutes non procedure based      Comments   >50% of visit spent in counseling and coordination of care     ________________________________________________________________________  Cate Joseph MD     Procedures: see electronic medical records for all procedures/Xrays and details which were not copied into this note but were reviewed prior to creation of Plan.       LABS:  I reviewed today's most current labs and imaging studies.   Pertinent labs include:  Recent Labs     01/04/20  0338 01/03/20  0447 01/02/20  1738   WBC 8.8 17.9* 14.5*   HGB 12.1 14.4 15.2   HCT 35.8* 42.5 46.7    482* 494*     Recent Labs     01/04/20  0338 01/03/20  1452 01/03/20  0857 01/03/20  0447 01/03/20  0120 01/02/20  1738    143 145 144 146* 131*   K 4.1 3.7 3.9 4.3 3.7 5.6*    108 109* 110* 111* 88*   CO2 27 25 24 16* 19* 10*   * 248* 248* 233* 158* 891*   BUN 19 29* 34* 35* 33* 31*   CREA 0.85 1.34* 1.50* 1.20 1.54* 1.64*   CA 7.6* 7.8* 7.8* 8.3* 8.0* 9.3   MG 2.3 2.3  --  2.4 2.5* 2.9*   PHOS  --   --   --   --  2.1* 7.2*   ALB  --   --   --  2.5*  --   --    TBILI  --   --   --  0.3  --   --    SGOT  --   --   --  19  --   --    ALT  --   --   --  78  --   --    INR  --   --   --   --   --  1.0       Signed: Sandip Busch MD

## 2020-01-05 VITALS
WEIGHT: 140.1 LBS | RESPIRATION RATE: 16 BRPM | OXYGEN SATURATION: 97 % | HEART RATE: 81 BPM | DIASTOLIC BLOOD PRESSURE: 102 MMHG | TEMPERATURE: 98.4 F | SYSTOLIC BLOOD PRESSURE: 164 MMHG | HEIGHT: 68 IN | BODY MASS INDEX: 21.23 KG/M2

## 2020-01-05 LAB
ANION GAP SERPL CALC-SCNC: 6 MMOL/L (ref 5–15)
BASOPHILS # BLD: 0 K/UL (ref 0–0.1)
BASOPHILS NFR BLD: 0 % (ref 0–1)
BUN SERPL-MCNC: 11 MG/DL (ref 6–20)
BUN/CREAT SERPL: 16 (ref 12–20)
CALCIUM SERPL-MCNC: 7.6 MG/DL (ref 8.5–10.1)
CHLORIDE SERPL-SCNC: 104 MMOL/L (ref 97–108)
CO2 SERPL-SCNC: 27 MMOL/L (ref 21–32)
CREAT SERPL-MCNC: 0.7 MG/DL (ref 0.7–1.3)
DIFFERENTIAL METHOD BLD: ABNORMAL
EOSINOPHIL # BLD: 0.1 K/UL (ref 0–0.4)
EOSINOPHIL NFR BLD: 2 % (ref 0–7)
ERYTHROCYTE [DISTWIDTH] IN BLOOD BY AUTOMATED COUNT: 13.3 % (ref 11.5–14.5)
GLUCOSE BLD STRIP.AUTO-MCNC: 177 MG/DL (ref 65–100)
GLUCOSE SERPL-MCNC: 228 MG/DL (ref 65–100)
HCT VFR BLD AUTO: 32.3 % (ref 36.6–50.3)
HGB BLD-MCNC: 11.1 G/DL (ref 12.1–17)
IMM GRANULOCYTES # BLD AUTO: 0 K/UL (ref 0–0.04)
IMM GRANULOCYTES NFR BLD AUTO: 0 % (ref 0–0.5)
LYMPHOCYTES # BLD: 2.3 K/UL (ref 0.8–3.5)
LYMPHOCYTES NFR BLD: 42 % (ref 12–49)
MAGNESIUM SERPL-MCNC: 2.4 MG/DL (ref 1.6–2.4)
MCH RBC QN AUTO: 31.6 PG (ref 26–34)
MCHC RBC AUTO-ENTMCNC: 34.4 G/DL (ref 30–36.5)
MCV RBC AUTO: 92 FL (ref 80–99)
MONOCYTES # BLD: 0.4 K/UL (ref 0–1)
MONOCYTES NFR BLD: 7 % (ref 5–13)
NEUTS SEG # BLD: 2.7 K/UL (ref 1.8–8)
NEUTS SEG NFR BLD: 49 % (ref 32–75)
NRBC # BLD: 0 K/UL (ref 0–0.01)
NRBC BLD-RTO: 0 PER 100 WBC
PLATELET # BLD AUTO: 321 K/UL (ref 150–400)
PMV BLD AUTO: 9.8 FL (ref 8.9–12.9)
POTASSIUM SERPL-SCNC: 3.4 MMOL/L (ref 3.5–5.1)
RBC # BLD AUTO: 3.51 M/UL (ref 4.1–5.7)
SERVICE CMNT-IMP: ABNORMAL
SODIUM SERPL-SCNC: 137 MMOL/L (ref 136–145)
WBC # BLD AUTO: 5.5 K/UL (ref 4.1–11.1)

## 2020-01-05 PROCEDURE — 94760 N-INVAS EAR/PLS OXIMETRY 1: CPT

## 2020-01-05 PROCEDURE — 74011250637 HC RX REV CODE- 250/637: Performed by: INTERNAL MEDICINE

## 2020-01-05 PROCEDURE — 80048 BASIC METABOLIC PNL TOTAL CA: CPT

## 2020-01-05 PROCEDURE — 83735 ASSAY OF MAGNESIUM: CPT

## 2020-01-05 PROCEDURE — 74011636637 HC RX REV CODE- 636/637: Performed by: INTERNAL MEDICINE

## 2020-01-05 PROCEDURE — 74011250636 HC RX REV CODE- 250/636: Performed by: INTERNAL MEDICINE

## 2020-01-05 PROCEDURE — 36415 COLL VENOUS BLD VENIPUNCTURE: CPT

## 2020-01-05 PROCEDURE — 74011000250 HC RX REV CODE- 250: Performed by: INTERNAL MEDICINE

## 2020-01-05 PROCEDURE — 85025 COMPLETE CBC W/AUTO DIFF WBC: CPT

## 2020-01-05 PROCEDURE — 82962 GLUCOSE BLOOD TEST: CPT

## 2020-01-05 RX ORDER — TAMSULOSIN HYDROCHLORIDE 0.4 MG/1
0.4 CAPSULE ORAL DAILY
Qty: 30 CAP | Refills: 1 | Status: SHIPPED | OUTPATIENT
Start: 2020-01-06 | End: 2021-11-15

## 2020-01-05 RX ADMIN — FAMOTIDINE 20 MG: 10 INJECTION, SOLUTION INTRAVENOUS at 08:26

## 2020-01-05 RX ADMIN — TAMSULOSIN HYDROCHLORIDE 0.4 MG: 0.4 CAPSULE ORAL at 08:26

## 2020-01-05 RX ADMIN — INSULIN GLARGINE 20 UNITS: 100 INJECTION, SOLUTION SUBCUTANEOUS at 08:25

## 2020-01-05 NOTE — PROGRESS NOTES
Bedside shift change report given to me (oncoming nurse) by Alvin Kumar  (offgoing nurse). Report given with SBAR, MAR and Recent Results.

## 2020-01-05 NOTE — DISCHARGE SUMMARY
Hospitalist Discharge Summary     Patient ID:  Karlie Gabriel  [id number]  56 y.o.  1980 1/2/2020    PCP on record: Aden Canavan, NP    Admit date: 1/2/2020  Discharge date and time: 1/5/2020    DISCHARGE DIAGNOSIS:    DKA  FELECIA  Right Hydronephrosis, resolved  Concentric Mural thickening of Bladder wall  Large bowel Concentric Thickening of large bowel  Hyponatremia:  N/V/Abdominal Pain  Leukocytosis:            CONSULTATIONS:  IP CONSULT TO UROLOGY    Excerpted HPI from H&P of Naz Cortes MD:  Stacie Schultz is a 42 y.o.  male  insulin-dependent diabetic presenting the emergency department complaining of nausea, vomiting, high blood sugar.  Symptoms started on Tuesday.  No fever.  No known underlying infection.  Reports compliance with his insulin.  Found his blood sugar to be high today, EMS read over 600.  Patient denies any fevers or chills.  Admits to cramping abdominal pain, no exacerbating or relieving factors.  Denies any change in stool.  Denies any rashes, shortness of breath or cough.  Denies any illicit drug use, denies any alcohol use. At this time patient is lying in bed c/o N/V abdominal pain, denies chest pian, no fever, no cough, no urinary symptoms, no other associated symptoms. We were asked to admit for work up and evaluation of the above problems.        ______________________________________________________________________  DISCHARGE SUMMARY/HOSPITAL COURSE:  for full details see H&P, daily progress notes, labs, consult notes. DKA: Resolved    Patient was admitted with a DKA, likely secondary to insulin nonadherence. He was on insulin drip initially then it was tapered off he was given Lantus 20 and which has controlled his glucose good. He was supposed to take Lantus, but was not able to afford    FELECIA: resolved       Right Hydronephrosis, resolved  Concentric Mural thickening of Bladder wall  ELICEO with Hydro, and distended bladder.   CT with resolved Hydronephrosis  Denied Beard, and no need for Beard now  Will place on Flomax  Follow up OP with Urology     Large bowel Concentric Thickening of large bowel  -?2/2 dehydration, Colitis  Denies abd pain, diarrhea  Will give GI follow up for colonoscopy  Discussed with Pt     Hyponatremia: resolved     N/V/Abdominal Pain- resolved  Elevated Lipase, improving    Leukocytosis: seems reactive, imrpved        Discussed with patient, he states that he takes his mother's Lantus, as he does not have insurance. I have advised against doing that. He also endorsed that he was missing some doses and also had episode of hypoglycemia at home. He has taken Novolin 70/30 in the past and it had worked for him. So I have prescribed Novolin 70/30. He needs to follow-up with the PCP. If he remains non-adherent with his medication he is likely to come back      _______________________________________________________________________  Patient seen and examined by me on discharge day. Pertinent Findings:  Gen:    Not in distress  Chest: Clear lungs  CVS:   Regular rhythm. No edema  Abd:  Soft, not distended, not tender  Neuro:  Alert, oriented x 3  _______________________________________________________________________  DISCHARGE MEDICATIONS:   Discharge Medication List as of 1/5/2020 11:28 AM      START taking these medications    Details   insulin NPH/insulin regular (NOVOLIN 70/30 U-100 INSULIN) 100 unit/mL (70-30) injection 15 Units by SubCUTAneous route Before breakfast and dinner., Normal, Disp-10 mL, R-1      OTHER Insulin needles,Alcohol swabs, syringes - for 1 month, Print, Disp-30 Each, R-1      tamsulosin (FLOMAX) 0.4 mg capsule Take 1 Cap by mouth daily. , Normal, Disp-30 Cap, R-1         CONTINUE these medications which have NOT CHANGED    Details   insulin regular (NOVOLIN R) 100 unit/mL injection 2-10 Units by SubCUTAneous route Before breakfast, lunch, and dinner.  Blood Glucose (mg/dL)       Insulin Dose (units)              150-200                               2               201-250                               4               251-300                                6               301-350                               8               >351                                   10, Historical Med         STOP taking these medications       insulin glargine (LANTUS,BASAGLAR) 100 unit/mL (3 mL) inpn Comments:   Reason for Stopping:                 Patient Follow Up Instructions:    Activity: Activity as tolerated      Follow-up Information     Follow up With Specialties Details Why Yas Wilde MD Gastroenterology In 2 weeks For Colonocopy [address]  [place] [address] [facility]  [phone]      Rey Oakes NP Nurse Practitioner In 1 week  [address] [facility] [address] [place] [address]  [phone]          ________________________________________________________________    Risk of deterioration: Low    Condition at Discharge:  Stable  __________________________________________________________________    Disposition  Home with family, no needs    ____________________________________________________________________    Code Status: Full Code  ___________________________________________________________________      Total time in minutes spent coordinating this discharge (includes going over instructions, follow-up, prescriptions, and preparing report for sign off to her PCP) :  38 minutes    Signed:  Agatha Willis MD

## 2020-01-05 NOTE — PROGRESS NOTES
I have reviewed discharge instructions with the patient. The patient verbalized understanding. Patient was given a copy of discharge instructions and work letter.

## 2020-01-05 NOTE — ROUTINE PROCESS
.  Kita Figueroa. January 5, 2020       RE: Marino Tello      To Whom It May Concern,    This is to certify that Marino Tello  may return to work on 01/07/2020. H was admitted to [facility] from 1/2/2020 to 1/05/2020    Please feel free to contact my office if you have any questions or concerns. Thank you for your assistance in this matter.       Sincerely,  Tato Barraza MD

## 2020-01-05 NOTE — PROGRESS NOTES
Bedside and Verbal shift change report given to Shara Dooley RN (oncoming nurse) by Aki Soto RN (offgoing nurse). Report included the following information SBAR, Kardex, Intake/Output, MAR and Recent Results.

## 2020-01-05 NOTE — PROGRESS NOTES
Uneventful night  received 1 unit insulin per protocol . Pt voiding well . 0730 Bedside shift change report given to [facility].  (oncoming nurse) by me (offgoing nurse). Report given with SBAR, MAR and Recent Results.

## 2020-01-05 NOTE — PROGRESS NOTES
Weekend CM reviewed medical record, followed up re: transitional care plans. Patient was medically cleared for discharge today 01/05/20. Discharge plan remains to return home with family support and transport. No additional questions/concerns reported at this time. CM remains available to further assist with any additional discharge needs as indicated.   Angela Hoyt, [address] [facility]

## 2020-01-05 NOTE — DISCHARGE INSTRUCTIONS
HOSPITALIST DISCHARGE INSTRUCTIONS    NAME: Sherwin Saucedo   :  1980   MRN:  [id number]     Date/Time:  2020 10:38 AM    ADMIT DATE: 2020   DISCHARGE DATE: 2020     DKA  FELECIA  Right Hydronephrosis, resolved  Concentric Mural thickening of Bladder wall  Large bowel Concentric Thickening of large bowel  Hyponatremia:  N/V/Abdominal Pain  Leukocytosis:          · It is important that you take the medication exactly as they are prescribed. · Keep your medication in the bottles provided by the pharmacist and keep a list of the medication names, dosages, and times to be taken in your wallet. · Do not take other medications without consulting your doctor. What to do at Home    Recommended diet:  Diabetic Diet    Recommended activity: Activity as tolerated      If you have questions regarding the hospital related prescriptions or hospital related issues please call [facility] at [phone]. You can always direct your questions to your primary care doctor if you are unable to reach your hospital physician; your PCP works as an extension of your hospital doctor just like your hospital doctor is an extension of your PCP for your time at the hospital [facility])    If you experience any of the following symptoms then please call your primary care physician or return to the emergency room if you cannot get hold of your doctor:    Fever, chills, nausea, vomiting, or persistent diarrhea  Worsening weakness or new problems with your speech or balance  Dark stools or visible blood in your stools  New Leg swelling or shortness of breath as these could be signs of a clot    Additional Instructions:      Bring these papers with you to your follow up appointments. The papers will help your doctors be sure to continue the care plan from the hospital.              Information obtained by :  I understand that if any problems occur once I am at home I am to contact my physician.     I understand and acknowledge receipt of the instructions indicated above.                                                                                                                                            Physician's or R.N.'s Signature                                                                  Date/Time                                                                                                                                              Patient or Representative Signature

## 2020-01-09 LAB
BACTERIA SPEC CULT: NORMAL
SERVICE CMNT-IMP: NORMAL

## 2020-01-25 ENCOUNTER — HOSPITAL ENCOUNTER (INPATIENT)
Age: 40
LOS: 2 days | Discharge: HOME | DRG: 638 | End: 2020-01-27
Attending: EMERGENCY MEDICINE | Admitting: HOSPITALIST
Payer: SUBSIDIZED

## 2020-01-25 DIAGNOSIS — N17.9 AKI (ACUTE KIDNEY INJURY) (HCC): Primary | ICD-10-CM

## 2020-01-25 DIAGNOSIS — R11.2 INTRACTABLE VOMITING WITH NAUSEA, UNSPECIFIED VOMITING TYPE: ICD-10-CM

## 2020-01-25 LAB
ALBUMIN SERPL-MCNC: 2.7 G/DL (ref 3.5–5)
ALBUMIN/GLOB SERPL: 0.7 {RATIO} (ref 1.1–2.2)
ALP SERPL-CCNC: 236 U/L (ref 45–117)
ALT SERPL-CCNC: 39 U/L (ref 12–78)
ANION GAP SERPL CALC-SCNC: 6 MMOL/L (ref 5–15)
APPEARANCE UR: CLEAR
AST SERPL-CCNC: 29 U/L (ref 15–37)
BACTERIA URNS QL MICRO: NEGATIVE /HPF
BASOPHILS # BLD: 0 K/UL (ref 0–0.1)
BASOPHILS NFR BLD: 0 % (ref 0–1)
BILIRUB SERPL-MCNC: 0.3 MG/DL (ref 0.2–1)
BILIRUB UR QL: NEGATIVE
BUN SERPL-MCNC: 38 MG/DL (ref 6–20)
BUN/CREAT SERPL: 30 (ref 12–20)
CALCIUM SERPL-MCNC: 9 MG/DL (ref 8.5–10.1)
CHLORIDE SERPL-SCNC: 99 MMOL/L (ref 97–108)
CO2 SERPL-SCNC: 37 MMOL/L (ref 21–32)
COLOR UR: ABNORMAL
COMMENT, HOLDF: NORMAL
CREAT SERPL-MCNC: 1.26 MG/DL (ref 0.7–1.3)
DIFFERENTIAL METHOD BLD: ABNORMAL
EOSINOPHIL # BLD: 0 K/UL (ref 0–0.4)
EOSINOPHIL NFR BLD: 0 % (ref 0–7)
EPITH CASTS URNS QL MICRO: ABNORMAL /LPF
ERYTHROCYTE [DISTWIDTH] IN BLOOD BY AUTOMATED COUNT: 14 % (ref 11.5–14.5)
GLOBULIN SER CALC-MCNC: 4.1 G/DL (ref 2–4)
GLUCOSE BLD STRIP.AUTO-MCNC: 58 MG/DL (ref 65–100)
GLUCOSE BLD STRIP.AUTO-MCNC: 61 MG/DL (ref 65–100)
GLUCOSE SERPL-MCNC: 94 MG/DL (ref 65–100)
GLUCOSE UR STRIP.AUTO-MCNC: >1000 MG/DL
HCT VFR BLD AUTO: 43.1 % (ref 36.6–50.3)
HGB BLD-MCNC: 14.7 G/DL (ref 12.1–17)
HGB UR QL STRIP: ABNORMAL
HYALINE CASTS URNS QL MICRO: ABNORMAL /LPF (ref 0–5)
IMM GRANULOCYTES # BLD AUTO: 0 K/UL (ref 0–0.04)
IMM GRANULOCYTES NFR BLD AUTO: 0 % (ref 0–0.5)
KETONES UR QL STRIP.AUTO: 15 MG/DL
LACTATE SERPL-SCNC: 1.9 MMOL/L (ref 0.4–2)
LEUKOCYTE ESTERASE UR QL STRIP.AUTO: NEGATIVE
LIPASE SERPL-CCNC: 751 U/L (ref 73–393)
LYMPHOCYTES # BLD: 1.5 K/UL (ref 0.8–3.5)
LYMPHOCYTES NFR BLD: 15 % (ref 12–49)
MCH RBC QN AUTO: 32 PG (ref 26–34)
MCHC RBC AUTO-ENTMCNC: 34.1 G/DL (ref 30–36.5)
MCV RBC AUTO: 93.7 FL (ref 80–99)
MONOCYTES # BLD: 1 K/UL (ref 0–1)
MONOCYTES NFR BLD: 10 % (ref 5–13)
NEUTS SEG # BLD: 7.3 K/UL (ref 1.8–8)
NEUTS SEG NFR BLD: 74 % (ref 32–75)
NITRITE UR QL STRIP.AUTO: NEGATIVE
NRBC # BLD: 0 K/UL (ref 0–0.01)
NRBC BLD-RTO: 0 PER 100 WBC
PH UR STRIP: 6 [PH] (ref 5–8)
PLATELET # BLD AUTO: 456 K/UL (ref 150–400)
PMV BLD AUTO: 9.7 FL (ref 8.9–12.9)
POTASSIUM SERPL-SCNC: 3.4 MMOL/L (ref 3.5–5.1)
PROT SERPL-MCNC: 6.8 G/DL (ref 6.4–8.2)
PROT UR STRIP-MCNC: >300 MG/DL
RBC # BLD AUTO: 4.6 M/UL (ref 4.1–5.7)
RBC #/AREA URNS HPF: ABNORMAL /HPF (ref 0–5)
SAMPLES BEING HELD,HOLD: NORMAL
SERVICE CMNT-IMP: ABNORMAL
SERVICE CMNT-IMP: ABNORMAL
SODIUM SERPL-SCNC: 142 MMOL/L (ref 136–145)
SP GR UR REFRACTOMETRY: 1.02 (ref 1–1.03)
UA: UC IF INDICATED,UAUC: ABNORMAL
UROBILINOGEN UR QL STRIP.AUTO: 0.2 EU/DL (ref 0.2–1)
WBC # BLD AUTO: 9.9 K/UL (ref 4.1–11.1)
WBC URNS QL MICRO: ABNORMAL /HPF (ref 0–4)

## 2020-01-25 PROCEDURE — 74011000250 HC RX REV CODE- 250: Performed by: HOSPITALIST

## 2020-01-25 PROCEDURE — 99285 EMERGENCY DEPT VISIT HI MDM: CPT

## 2020-01-25 PROCEDURE — 85025 COMPLETE CBC W/AUTO DIFF WBC: CPT

## 2020-01-25 PROCEDURE — 82962 GLUCOSE BLOOD TEST: CPT

## 2020-01-25 PROCEDURE — 36415 COLL VENOUS BLD VENIPUNCTURE: CPT

## 2020-01-25 PROCEDURE — 81001 URINALYSIS AUTO W/SCOPE: CPT

## 2020-01-25 PROCEDURE — 80053 COMPREHEN METABOLIC PANEL: CPT

## 2020-01-25 PROCEDURE — C9113 INJ PANTOPRAZOLE SODIUM, VIA: HCPCS | Performed by: HOSPITALIST

## 2020-01-25 PROCEDURE — 96374 THER/PROPH/DIAG INJ IV PUSH: CPT

## 2020-01-25 PROCEDURE — 83690 ASSAY OF LIPASE: CPT

## 2020-01-25 PROCEDURE — 74011250636 HC RX REV CODE- 250/636: Performed by: HOSPITALIST

## 2020-01-25 PROCEDURE — 99284 EMERGENCY DEPT VISIT MOD MDM: CPT

## 2020-01-25 PROCEDURE — 83605 ASSAY OF LACTIC ACID: CPT

## 2020-01-25 PROCEDURE — 65270000032 HC RM SEMIPRIVATE

## 2020-01-25 PROCEDURE — 74011250636 HC RX REV CODE- 250/636: Performed by: EMERGENCY MEDICINE

## 2020-01-25 PROCEDURE — 74011250637 HC RX REV CODE- 250/637: Performed by: HOSPITALIST

## 2020-01-25 RX ORDER — SODIUM CHLORIDE 0.9 % (FLUSH) 0.9 %
5-40 SYRINGE (ML) INJECTION AS NEEDED
Status: DISCONTINUED | OUTPATIENT
Start: 2020-01-25 | End: 2020-01-27 | Stop reason: HOSPADM

## 2020-01-25 RX ORDER — SODIUM CHLORIDE 0.9 % (FLUSH) 0.9 %
5-40 SYRINGE (ML) INJECTION EVERY 8 HOURS
Status: DISCONTINUED | OUTPATIENT
Start: 2020-01-25 | End: 2020-01-27 | Stop reason: HOSPADM

## 2020-01-25 RX ORDER — INSULIN GLARGINE 100 [IU]/ML
10 INJECTION, SOLUTION SUBCUTANEOUS DAILY
Status: DISCONTINUED | OUTPATIENT
Start: 2020-01-26 | End: 2020-01-25

## 2020-01-25 RX ORDER — ONDANSETRON 2 MG/ML
8 INJECTION INTRAMUSCULAR; INTRAVENOUS
Status: COMPLETED | OUTPATIENT
Start: 2020-01-25 | End: 2020-01-25

## 2020-01-25 RX ORDER — MAGNESIUM SULFATE 100 %
4 CRYSTALS MISCELLANEOUS AS NEEDED
Status: DISCONTINUED | OUTPATIENT
Start: 2020-01-25 | End: 2020-01-27 | Stop reason: HOSPADM

## 2020-01-25 RX ORDER — DEXTROSE MONOHYDRATE 100 MG/ML
0-250 INJECTION, SOLUTION INTRAVENOUS AS NEEDED
Status: DISCONTINUED | OUTPATIENT
Start: 2020-01-25 | End: 2020-01-27 | Stop reason: HOSPADM

## 2020-01-25 RX ORDER — ONDANSETRON 2 MG/ML
4 INJECTION INTRAMUSCULAR; INTRAVENOUS
Status: DISCONTINUED | OUTPATIENT
Start: 2020-01-25 | End: 2020-01-27 | Stop reason: HOSPADM

## 2020-01-25 RX ORDER — INSULIN LISPRO 100 [IU]/ML
INJECTION, SOLUTION INTRAVENOUS; SUBCUTANEOUS
Status: DISCONTINUED | OUTPATIENT
Start: 2020-01-25 | End: 2020-01-27 | Stop reason: HOSPADM

## 2020-01-25 RX ORDER — ACETAMINOPHEN 325 MG/1
650 TABLET ORAL
Status: DISCONTINUED | OUTPATIENT
Start: 2020-01-25 | End: 2020-01-27 | Stop reason: HOSPADM

## 2020-01-25 RX ORDER — POTASSIUM CHLORIDE AND SODIUM CHLORIDE 900; 300 MG/100ML; MG/100ML
INJECTION, SOLUTION INTRAVENOUS CONTINUOUS
Status: DISCONTINUED | OUTPATIENT
Start: 2020-01-25 | End: 2020-01-27

## 2020-01-25 RX ORDER — TAMSULOSIN HYDROCHLORIDE 0.4 MG/1
0.4 CAPSULE ORAL DAILY
Status: DISCONTINUED | OUTPATIENT
Start: 2020-01-26 | End: 2020-01-27 | Stop reason: HOSPADM

## 2020-01-25 RX ADMIN — Medication 16 G: at 23:33

## 2020-01-25 RX ADMIN — ONDANSETRON 4 MG: 2 INJECTION INTRAMUSCULAR; INTRAVENOUS at 22:45

## 2020-01-25 RX ADMIN — SODIUM CHLORIDE 40 MG: 9 INJECTION INTRAMUSCULAR; INTRAVENOUS; SUBCUTANEOUS at 22:45

## 2020-01-25 RX ADMIN — POTASSIUM CHLORIDE AND SODIUM CHLORIDE: 900; 300 INJECTION, SOLUTION INTRAVENOUS at 22:46

## 2020-01-25 RX ADMIN — SODIUM CHLORIDE 1000 ML: 900 INJECTION, SOLUTION INTRAVENOUS at 18:04

## 2020-01-25 RX ADMIN — ACETAMINOPHEN 650 MG: 325 TABLET ORAL at 20:41

## 2020-01-25 RX ADMIN — ONDANSETRON 8 MG: 2 INJECTION INTRAMUSCULAR; INTRAVENOUS at 18:04

## 2020-01-25 RX ADMIN — Medication 10 ML: at 22:00

## 2020-01-25 NOTE — ED TRIAGE NOTES
Triage Note: Pt arrives via EMS from home for nausea and vomiting x 3 days. Per patient he been since for 3 days with nausea and vomiting. Denies diarrhea or fever. En route, he was given 4 mg of PO zofran and 500 ml of NS. .

## 2020-01-25 NOTE — ED PROVIDER NOTES
HPI the patient has a 3-day history of vomiting. He thinks he is vomiting more than 10 times per day. He is an insulin-dependent diabetic but does not follow his blood sugar. He was admitted to the hospital earlier this month for DKA. He also admits to having abdominal pain. He denies fever, diarrhea, chest pain, cough, shortness of breath or other complaints.     Past Medical History:   Diagnosis Date    [facility]     [facility]     Depression     Diabetes [facility]     DKA, type 1 ([facility] 1/27/2013    diagnosed age 23    H/O noncompliance with medical treatment, presenting hazards to health     MRSA (methicillin resistant staph aureus) culture positive     MRSA (methicillin resistant Staphylococcus aureus)     Face    Noncompliance with medication regimen     Second hand smoke exposure     Seizure ([facility]     Seizures ([facility] 2006 or 2007    one episode during penitentiary       Past Surgical History:   Procedure Laterality Date    HX HEENT      top left wisdom tooth    HX ORTHOPAEDIC Left     wrist; MCV    UPPER GI ENDOSCOPY,BIOPSY  11/20/2018              Family History:   Problem Relation Age of Onset    Diabetes Mother     Diabetes Other         neice, type 1        Social History     Socioeconomic History    Marital status: SINGLE     Spouse name: Not on file    Number of children: Not on file    Years of education: Not on file    Highest education level: Not on file   Occupational History    Not on file   Social Needs    Financial resource strain: Not on file    Food insecurity:     Worry: Not on file     Inability: Not on file    Transportation needs:     Medical: Not on file     Non-medical: Not on file   Tobacco Use    Smoking status: Current Some Day Smoker     Packs/day: 0.10     Years: 16.00     Pack years: 1.60     Types: Cigarettes    Smokeless tobacco: Never Used   Substance and Sexual Activity    Alcohol use: No    Drug use: No    Sexual activity: Not on file   Lifestyle    Physical activity:     Days per week: Not on file     Minutes per session: Not on file    Stress: Not on file   Relationships    Social connections:     Talks on phone: Not on file     Gets together: Not on file     Attends Mu-ism service: Not on file     Active member of club or organization: Not on file     Attends meetings of clubs or organizations: Not on file     Relationship status: Not on file    Intimate partner violence:     Fear of current or ex partner: Not on file     Emotionally abused: Not on file     Physically abused: Not on file     Forced sexual activity: Not on file   Other Topics Concern    Not on file   Social History Narrative    ** Merged History Encounter **              ALLERGIES: Patient has no known allergies. Review of Systems   Constitutional: Negative for fever. Eyes: Negative for visual disturbance. Respiratory: Negative for cough, shortness of breath and wheezing. Cardiovascular: Negative for chest pain and leg swelling. Gastrointestinal: Positive for abdominal pain, nausea and vomiting. Negative for diarrhea. Genitourinary: Negative for dysuria. Musculoskeletal: Negative. Negative for back pain and neck stiffness. Skin: Negative for rash. Neurological: Negative. Negative for syncope and headaches. Psychiatric/Behavioral: Negative for confusion. All other systems reviewed and are negative. Vitals:    01/25/20 1732   BP: (!) 180/99   Pulse: (!) (P) 104   Resp: (P) 16   Temp: (P) 97.9 °F (36.6 °C)   SpO2: 100%            Physical Exam  Constitutional:       General: He is not in acute distress. Appearance: He is well-developed. HENT:      Head: Normocephalic. Eyes:      Pupils: Pupils are equal, round, and reactive to light. Neck:      Musculoskeletal: Normal range of motion. Cardiovascular:      Rate and Rhythm: Normal rate. Heart sounds: No murmur.    Pulmonary:      Effort: Pulmonary effort is normal.      Breath sounds: Normal breath sounds. Abdominal:      Palpations: Abdomen is soft. Tenderness: There is tenderness. Comments: Mild periumbilical tenderness   Musculoskeletal: Normal range of motion. Skin:     General: Skin is warm and dry. Capillary Refill: Capillary refill takes less than 2 seconds. Neurological:      Mental Status: He is alert. Psychiatric:         Behavior: Behavior normal.          MDM       Procedures    Hospitalist Perfect Serve for Admission  6:22 PM    ED Room Number: SER08/08  Patient Name and age:  Analia Wright 42 y.o.  male  Working Diagnosis:   1. FELECIA (acute kidney injury) ([facility]    2. Intractable vomiting with nausea, unspecified vomiting type      Readmission: yes  Isolation Requirements:  no  Recommended Level of Care:  med/surg  Code Status:  Full Code  Department:[facility] - [phone]  Other:  3 days of vomiting . Pt is iddm. Bun 38/ creat. 1.4, but not in dka.

## 2020-01-26 LAB
ALBUMIN SERPL-MCNC: 2.2 G/DL (ref 3.5–5)
ALBUMIN/GLOB SERPL: 0.7 {RATIO} (ref 1.1–2.2)
ALP SERPL-CCNC: 185 U/L (ref 45–117)
ALT SERPL-CCNC: 28 U/L (ref 12–78)
ANION GAP SERPL CALC-SCNC: 4 MMOL/L (ref 5–15)
AST SERPL-CCNC: 22 U/L (ref 15–37)
BASOPHILS # BLD: 0 K/UL (ref 0–0.1)
BASOPHILS NFR BLD: 0 % (ref 0–1)
BILIRUB SERPL-MCNC: 0.3 MG/DL (ref 0.2–1)
BUN SERPL-MCNC: 29 MG/DL (ref 6–20)
BUN/CREAT SERPL: 31 (ref 12–20)
CALCIUM SERPL-MCNC: 7.8 MG/DL (ref 8.5–10.1)
CHLORIDE SERPL-SCNC: 103 MMOL/L (ref 97–108)
CO2 SERPL-SCNC: 33 MMOL/L (ref 21–32)
CREAT SERPL-MCNC: 0.95 MG/DL (ref 0.7–1.3)
DIFFERENTIAL METHOD BLD: ABNORMAL
EOSINOPHIL # BLD: 0.1 K/UL (ref 0–0.4)
EOSINOPHIL NFR BLD: 1 % (ref 0–7)
ERYTHROCYTE [DISTWIDTH] IN BLOOD BY AUTOMATED COUNT: 13.8 % (ref 11.5–14.5)
GLOBULIN SER CALC-MCNC: 3.3 G/DL (ref 2–4)
GLUCOSE BLD STRIP.AUTO-MCNC: 131 MG/DL (ref 65–100)
GLUCOSE BLD STRIP.AUTO-MCNC: 140 MG/DL (ref 65–100)
GLUCOSE BLD STRIP.AUTO-MCNC: 153 MG/DL (ref 65–100)
GLUCOSE BLD STRIP.AUTO-MCNC: 256 MG/DL (ref 65–100)
GLUCOSE BLD STRIP.AUTO-MCNC: 268 MG/DL (ref 65–100)
GLUCOSE BLD STRIP.AUTO-MCNC: 332 MG/DL (ref 65–100)
GLUCOSE SERPL-MCNC: 149 MG/DL (ref 65–100)
HCT VFR BLD AUTO: 37.3 % (ref 36.6–50.3)
HGB BLD-MCNC: 12.4 G/DL (ref 12.1–17)
IMM GRANULOCYTES # BLD AUTO: 0 K/UL (ref 0–0.04)
IMM GRANULOCYTES NFR BLD AUTO: 0 % (ref 0–0.5)
LYMPHOCYTES # BLD: 2.1 K/UL (ref 0.8–3.5)
LYMPHOCYTES NFR BLD: 27 % (ref 12–49)
MAGNESIUM SERPL-MCNC: 2.2 MG/DL (ref 1.6–2.4)
MCH RBC QN AUTO: 32 PG (ref 26–34)
MCHC RBC AUTO-ENTMCNC: 33.2 G/DL (ref 30–36.5)
MCV RBC AUTO: 96.1 FL (ref 80–99)
MONOCYTES # BLD: 0.7 K/UL (ref 0–1)
MONOCYTES NFR BLD: 9 % (ref 5–13)
NEUTS SEG # BLD: 4.9 K/UL (ref 1.8–8)
NEUTS SEG NFR BLD: 63 % (ref 32–75)
NRBC # BLD: 0 K/UL (ref 0–0.01)
NRBC BLD-RTO: 0 PER 100 WBC
PHOSPHATE SERPL-MCNC: 2.3 MG/DL (ref 2.6–4.7)
PLATELET # BLD AUTO: 337 K/UL (ref 150–400)
PMV BLD AUTO: 9.6 FL (ref 8.9–12.9)
POTASSIUM SERPL-SCNC: 3.6 MMOL/L (ref 3.5–5.1)
PROT SERPL-MCNC: 5.5 G/DL (ref 6.4–8.2)
RBC # BLD AUTO: 3.88 M/UL (ref 4.1–5.7)
SERVICE CMNT-IMP: ABNORMAL
SODIUM SERPL-SCNC: 140 MMOL/L (ref 136–145)
WBC # BLD AUTO: 7.8 K/UL (ref 4.1–11.1)

## 2020-01-26 PROCEDURE — 85025 COMPLETE CBC W/AUTO DIFF WBC: CPT

## 2020-01-26 PROCEDURE — 74011250636 HC RX REV CODE- 250/636: Performed by: HOSPITALIST

## 2020-01-26 PROCEDURE — 84100 ASSAY OF PHOSPHORUS: CPT

## 2020-01-26 PROCEDURE — 74011250636 HC RX REV CODE- 250/636: Performed by: INTERNAL MEDICINE

## 2020-01-26 PROCEDURE — C9113 INJ PANTOPRAZOLE SODIUM, VIA: HCPCS | Performed by: HOSPITALIST

## 2020-01-26 PROCEDURE — 74011000250 HC RX REV CODE- 250: Performed by: HOSPITALIST

## 2020-01-26 PROCEDURE — 83735 ASSAY OF MAGNESIUM: CPT

## 2020-01-26 PROCEDURE — 65270000032 HC RM SEMIPRIVATE

## 2020-01-26 PROCEDURE — 80053 COMPREHEN METABOLIC PANEL: CPT

## 2020-01-26 PROCEDURE — 82962 GLUCOSE BLOOD TEST: CPT

## 2020-01-26 PROCEDURE — 74011250637 HC RX REV CODE- 250/637: Performed by: HOSPITALIST

## 2020-01-26 PROCEDURE — 36415 COLL VENOUS BLD VENIPUNCTURE: CPT

## 2020-01-26 PROCEDURE — 74011636637 HC RX REV CODE- 636/637: Performed by: HOSPITALIST

## 2020-01-26 RX ORDER — METOCLOPRAMIDE HYDROCHLORIDE 5 MG/ML
5 INJECTION INTRAMUSCULAR; INTRAVENOUS EVERY 6 HOURS
Status: DISCONTINUED | OUTPATIENT
Start: 2020-01-26 | End: 2020-01-27 | Stop reason: HOSPADM

## 2020-01-26 RX ORDER — INSULIN GLARGINE 100 [IU]/ML
10 INJECTION, SOLUTION SUBCUTANEOUS DAILY
Status: DISCONTINUED | OUTPATIENT
Start: 2020-01-26 | End: 2020-01-26

## 2020-01-26 RX ADMIN — Medication 10 ML: at 21:37

## 2020-01-26 RX ADMIN — SODIUM CHLORIDE 40 MG: 9 INJECTION INTRAMUSCULAR; INTRAVENOUS; SUBCUTANEOUS at 21:36

## 2020-01-26 RX ADMIN — ONDANSETRON 4 MG: 2 INJECTION INTRAMUSCULAR; INTRAVENOUS at 12:50

## 2020-01-26 RX ADMIN — Medication 10 ML: at 13:00

## 2020-01-26 RX ADMIN — INSULIN LISPRO 5 UNITS: 100 INJECTION, SOLUTION INTRAVENOUS; SUBCUTANEOUS at 12:44

## 2020-01-26 RX ADMIN — ACETAMINOPHEN 650 MG: 325 TABLET ORAL at 15:43

## 2020-01-26 RX ADMIN — POTASSIUM CHLORIDE AND SODIUM CHLORIDE: 900; 300 INJECTION, SOLUTION INTRAVENOUS at 23:45

## 2020-01-26 RX ADMIN — INSULIN LISPRO 3 UNITS: 100 INJECTION, SOLUTION INTRAVENOUS; SUBCUTANEOUS at 21:40

## 2020-01-26 RX ADMIN — ONDANSETRON 4 MG: 2 INJECTION INTRAMUSCULAR; INTRAVENOUS at 04:19

## 2020-01-26 RX ADMIN — METOCLOPRAMIDE 5 MG: 5 INJECTION, SOLUTION INTRAMUSCULAR; INTRAVENOUS at 12:48

## 2020-01-26 RX ADMIN — METOCLOPRAMIDE 5 MG: 5 INJECTION, SOLUTION INTRAMUSCULAR; INTRAVENOUS at 23:45

## 2020-01-26 RX ADMIN — SODIUM CHLORIDE 40 MG: 9 INJECTION INTRAMUSCULAR; INTRAVENOUS; SUBCUTANEOUS at 08:44

## 2020-01-26 RX ADMIN — POTASSIUM CHLORIDE AND SODIUM CHLORIDE: 900; 300 INJECTION, SOLUTION INTRAVENOUS at 06:55

## 2020-01-26 RX ADMIN — HUMAN INSULIN 8 UNITS: 100 INJECTION, SUSPENSION SUBCUTANEOUS at 17:03

## 2020-01-26 RX ADMIN — ONDANSETRON 4 MG: 2 INJECTION INTRAMUSCULAR; INTRAVENOUS at 08:44

## 2020-01-26 RX ADMIN — INSULIN LISPRO 2 UNITS: 100 INJECTION, SOLUTION INTRAVENOUS; SUBCUTANEOUS at 06:55

## 2020-01-26 RX ADMIN — TAMSULOSIN HYDROCHLORIDE 0.4 MG: 0.4 CAPSULE ORAL at 08:44

## 2020-01-26 RX ADMIN — POTASSIUM CHLORIDE AND SODIUM CHLORIDE: 900; 300 INJECTION, SOLUTION INTRAVENOUS at 15:42

## 2020-01-26 RX ADMIN — HUMAN INSULIN 8 UNITS: 100 INJECTION, SUSPENSION SUBCUTANEOUS at 06:54

## 2020-01-26 RX ADMIN — METOCLOPRAMIDE 5 MG: 5 INJECTION, SOLUTION INTRAMUSCULAR; INTRAVENOUS at 17:03

## 2020-01-26 RX ADMIN — INSULIN LISPRO 2 UNITS: 100 INJECTION, SOLUTION INTRAVENOUS; SUBCUTANEOUS at 17:02

## 2020-01-26 NOTE — PROGRESS NOTES
Hospitalist Progress Note  Juan Miguel Ashford MD  Answering service: [phone] OR [phone] from in house phone        Date of Service:  2020  NAME:  Kade Abreu  :  1980  MRN:  [id number]  PCP: None    Chief Complaint:   Chief Complaint   Patient presents with    Nausea         Admission Summary:     Kade Abreu is a 42 y.o. male who presented with    Kade Abreu is a 42 y.o. male who presents with vomiting      42 WM with history of biplar, IDDM and seizure dz, presented to [place] [facility] with   3-day history of vomiting. [facility] thinks he is vomiting more than 10 times per day. [facility] is an insulin-dependent diabetic but does not follow his blood sugar. [facility] was admitted to the hospital earlier this month for DKA.  denied any He also admits to having abdominal pain.  He denies fever, diarrhea, chest pain, cough, shortness of breath or other complaints. Had similar vomiting in the past.     Interval history / Subjective:   Patient seen for Follow up of vomiting     Patient seen and examined by the bedside, Labs, images and notes reviewed  Still having nausea and vomiting . No fever. No hemetemesis  Discussed with nursing staff, no acute issues overnight, orders reviewed. Assessment & Plan:     1. Vomiting:   H/o esophagitis, GI consulted  On protonix   On reglan      2. IDDM: uncontrolled, no DKA at this time. Start SSI. Started on NPH, will adjust accordingly     3. Hypokalemia: give iv kcl. #H/o esophagitis   Plan as above     Code status: Full Code    DVT prophylaxis: scds    Care Plan discussed with: Patient/Family    Disposition: TBD    Hospital Problems  Date Reviewed: 2020          Codes Class Noted POA    FELECIA (acute kidney injury) [facility]) ICD-10-CM: N17.9  ICD-9-CM: 584.9  2020 Unknown                Review of Systems:   A comprehensive review of systems was negative except for that written in the HPI.          Physical Examination:     Visit Vitals  /86 (BP 1 Location: Right arm, BP Patient Position: At rest)   Pulse 96   Temp 98.6 °F (37 °C)   Resp 18   Ht 5' 9\" (1.753 m)   Wt 60.1 kg (132 lb 7.9 oz)   SpO2 98%   BMI 19.57 kg/m²     General:  Alert, cooperative, no distress, appears stated age. Head:  Normocephalic, without obvious abnormality, atraumatic. Lungs:   Clear to auscultation bilaterally. Heart:  Regular rate and rhythm, S1, S2 normal, no murmur, click, rub or gallop. Abdomen:   Soft, non-tender. Bowel sounds normal. No masses,  No organomegaly. Extremities: Extremities normal, atraumatic, no cyanosis or edema. Pulses: 2+ and symmetric all extremities. Skin: Skin color, texture, turgor normal. No rashes or lesions   Neurologic: CNII-XII intact. Normal strength, sensation and reflexes throughout. Vital Signs:    Last 24hrs VS reviewed since prior progress note. Most recent are:    Visit Vitals  /86 (BP 1 Location: Right arm, BP Patient Position: At rest)   Pulse 96   Temp 98.6 °F (37 °C)   Resp 18   Ht 5' 9\" (1.753 m)   Wt 60.1 kg (132 lb 7.9 oz)   SpO2 98%   BMI 19.57 kg/m²         Intake/Output Summary (Last 24 hours) at 2020 1316  Last data filed at 2020 9668  Gross per 24 hour   Intake --   Output 1000 ml   Net -1000 ml        Tmax:  Temp (24hrs), Av.2 °F (36.8 °C), Min:97.9 °F (36.6 °C), Max:98.6 °F (37 °C)      Data Review:   Data reviewed by myself:  Xr Abd Acute W 1 V Chest    Result Date: 2020  EXAM:  XR ABD ACUTE W 1 V CHEST INDICATION:  N/V. Additional history: Emesis and hyperglycemia COMPARISON: None. [place] FINDINGS: The upright chest radiograph demonstrates no focal consolidation. No visible pneumothorax or pleural effusion. Cardiac monitor leads overly the patient. Nipple adornment. . Supine and upright views of the abdomen demonstrate a paucity of bowel gas without evidence of acute process. No visible pneumoperitoneum. The bones and soft tissues are within normal limits. .    IMPRESSION: 1.  No evidence of acute process. Ct Abd Pelv W Wo Cont    Result Date: 1/3/2020  EXAM:  CT ABDOMEN PELVIS WITH CONTRAST INDICATION:  Hydronephrosis. Additional history: COMPARISON: CT of the abdomen and pelvis, 11/20/2018 and 10/29/2019. Ultrasound renal, 1/2/2020. [place] TECHNIQUE: Multislice helical CT was performed from the diaphragm to the symphysis pubis before and after  intravenous contrast administration. Contiguous 5 mm axial images were reconstructed and lung and soft tissue windows were generated. Coronal and sagittal reformations were generated. CT dose reduction was achieved through use of a standardized protocol tailored for this examination and automatic exposure control for dose modulation. [place] FINDINGS: INCIDENTALLY IMAGED CHEST: Heart/vessels: Within normal limits. Lungs/Pleura: Within normal limits. . ABDOMEN: Liver: Within normal limits. Gallbladder/Biliary: Within normal limits. Spleen: Within normal limits. Splenule Pancreas: Within normal limits. Adrenals: Within normal limits. Kidneys: Extrarenal pelves bilaterally. Peritoneum/Mesenteries: Within normal limits. Extraperitoneum: Within normal limits. Gastrointestinal tract: Hiatal hernia. Concentric mural thickening throughout the large bowel which is relatively decompressed. Normal-appearing appendix. Vascular: Within normal limits. Circumaortic left renal vein . PELVIS: Extraperitoneum: Within normal limits. Ureters: Within normal limits. Bladder: The bladder is only partially opacified with contrast material on delayed phase imaging. There is no visible filling defect. The bladder is slightly distended with concentric mural thickening Reproductive System: Within normal limits. . MSK: Within normal limits. .    IMPRESSION: 1. There are extrarenal pelves bilaterally. 2. There is some concentric mural thickening of the bladder wall. Recommend clinical correlation for outlet obstruction and/or cystitis.  3. There is concentric mural thickening in the large bowel which is mostly decompressed. Consider the possibility of colitis. 4. Incidental findings as above. Us Retroperitoneum Comp    Result Date: 1/2/2020  EXAM:  US RETROPERITONEUM COMP INDICATION:  r/o obstruction. Additional history: COMPARISON: Renal ultrasound, 10/31/2019. CT of the abdomen and pelvis, 10/29/2019 . TECHNIQUE: Real-time sonography of the kidneys, retroperitoneum and bladder was performed with multiple static images obtained. [place] FINDINGS: The kidneys have normal echogenicity with no mass or stone. Significant, right-sided hydronephrosis with the renal pelvis measuring up to 5.3 cm The right kidney measures cm and the left kidney measures 14.9 cm in length. . The aorta tapers normally. The proximal iliac arteries are not well visualized due to body habitus. The IVC is normal. No retroperitoneal mass is identified. . The urinary bladder is markedly distended . IMPRESSION: 1. Significant, right-sided hydronephrosis. 2. Markedly distended bladder. Lab Results   Component Value Date/Time    Specimen Description: NASAL 04/30/2014 03:30 AM    Specimen Description: NARES 01/28/2014 08:30 PM    Specimen Description: NAR 07/04/2013 11:00 PM     Lab Results   Component Value Date/Time    Culture result: NO GROWTH 6 DAYS 01/03/2020 08:57 AM    Culture result: NO GROWTH 5 DAYS 10/30/2019 02:01 AM    Culture result: MODERATE STAPHYLOCOCCUS AUREUS (A) 03/12/2018 02:14 PM     All Micro Results     Procedure Component Value Units Date/Time    CULTURE, MRSA [[id number]] Collected:  01/26/20 0440    Order Status:  Completed Specimen:  Nasal Updated:  01/26/20 0608          Labs: reviewed by myself.      Recent Labs     01/26/20 0440 01/25/20  1742   WBC 7.8 9.9   HGB 12.4 14.7   HCT 37.3 43.1    456*     Recent Labs     01/26/20 0440 01/25/20  1742    142   K 3.6 3.4*    99   CO2 33* 37*   BUN 29* 38*   CREA 0.95 1.26   * 94   CA 7.8* 9.0   MG 2.2  --    PHOS 2.3*  -- Recent Labs     01/26/20  0440 01/25/20  1742   SGOT 22 29   ALT 28 39   * 236*   TBILI 0.3 0.3   TP 5.5* 6.8   ALB 2.2* 2.7*   GLOB 3.3 4.1*   LPSE  --  751*     No results for input(s): INR, PTP, APTT, INREXT in the last 72 hours. No results for input(s): FE, TIBC, PSAT, FERR in the last 72 hours. No results found for: FOL, RBCF   No results for input(s): PH, PCO2, PO2 in the last 72 hours. No results for input(s): CPK, CKNDX, TROIQ in the last 72 hours.     No lab exists for component: CPKMB  Lab Results   Component Value Date/Time    Cholesterol, total 222 (H) 03/22/2017 07:20 PM    HDL Cholesterol 80 03/22/2017 07:20 PM    LDL, calculated 110.4 (H) 03/22/2017 07:20 PM    Triglyceride 158 (H) 03/22/2017 07:20 PM    CHOL/HDL Ratio 2.8 03/22/2017 07:20 PM     Lab Results   Component Value Date/Time    Glucose (POC) 268 (H) 01/26/2020 11:25 AM    Glucose (POC) 153 (H) 01/26/2020 05:46 AM    Glucose (POC) 131 (H) 01/25/2020 11:58 PM    Glucose (POC) 61 (L) 01/25/2020 11:16 PM    Glucose (POC) 58 (L) 01/25/2020 10:59 PM     Lab Results   Component Value Date/Time    Color YELLOW/STRAW 01/25/2020 06:05 PM    Appearance CLEAR 01/25/2020 06:05 PM    Specific gravity 1.020 01/25/2020 06:05 PM    Specific gravity 1.029 01/02/2020 08:14 PM    pH (UA) 6.0 01/25/2020 06:05 PM    Protein >300 (A) 01/25/2020 06:05 PM    Glucose >1,000 (A) 01/25/2020 06:05 PM    Ketone 15 (A) 01/25/2020 06:05 PM    Bilirubin NEGATIVE  01/25/2020 06:05 PM    Urobilinogen 0.2 01/25/2020 06:05 PM    Nitrites NEGATIVE  01/25/2020 06:05 PM    Leukocyte Esterase NEGATIVE  01/25/2020 06:05 PM    Epithelial cells FEW 01/25/2020 06:05 PM    Bacteria NEGATIVE  01/25/2020 06:05 PM    WBC 0-4 01/25/2020 06:05 PM    RBC 5-10 01/25/2020 06:05 PM         Medications Reviewed:     Current Facility-Administered Medications   Medication Dose Route Frequency    metoclopramide HCl (REGLAN) injection 5 mg  5 mg IntraVENous Q6H    0.9% sodium chloride with KCl 40 mEq/L infusion   IntraVENous CONTINUOUS    sodium chloride (NS) flush 5-40 mL  5-40 mL IntraVENous Q8H    sodium chloride (NS) flush 5-40 mL  5-40 mL IntraVENous PRN    acetaminophen (TYLENOL) tablet 650 mg  650 mg Oral Q4H PRN    ondansetron (ZOFRAN) injection 4 mg  4 mg IntraVENous Q4H PRN    glucose chewable tablet 16 g  4 Tab Oral PRN    glucagon (GLUCAGEN) injection 1 mg  1 mg IntraMUSCular PRN    dextrose 10% infusion 0-250 mL  0-250 mL IntraVENous PRN    insulin lispro (HUMALOG) injection   SubCUTAneous AC&HS    tamsulosin (FLOMAX) capsule 0.4 mg  0.4 mg Oral DAILY    pantoprazole (PROTONIX) 40 mg in 0.9% sodium chloride 10 mL injection  40 mg IntraVENous Q12H    insulin NPH (NOVOLIN N, HUMULIN N) injection 8 Units  8 Units SubCUTAneous ACB&D     ______________________________________________________________________  EXPECTED LENGTH OF STAY: - - -  ACTUAL LENGTH OF STAY:          1                 Lester Hayes MD     Patient's emergency contacts:  Extended Emergency Contact Information  Primary Emergency Contact: [place] Phone: [phone]  Mobile Phone: [phone]  Relation: Mother  [facility] Emergency [facility] Phone: [phone]  Mobile Phone: [phone]  Relation: Other Relative

## 2020-01-26 NOTE — H&P
History & Physical    Primary Care Provider: None  Source of Information: Patient     History of Presenting Illness:   Meka Santos is a 42 y.o. male who presents with vomiting     42 WM with history of biplar, IDDM and seizure dz, presented to [place] [facility] with   3-day history of vomiting. He thinks he is vomiting more than 10 times per day. He is an insulin-dependent diabetic but does not follow his blood sugar. He was admitted to the hospital earlier this month for DKA. denied any He also admits to having abdominal pain. He denies fever, diarrhea, chest pain, cough, shortness of breath or other complaints. Had similar vomiting in the past.      Review of Systems:  General: HPI, no changes of weight or sleep  HEENT: no headache, no vision changes, no nose discharge, no hearing changes   RES: no wheezing, no cough, no sob  CVS: no cp, no palpitation.   Muscular: no joint swelling, no muscle pain, no leg swelling  Skin: no rash, no itching   GI: HPI   : no dysuria, no hematuria  Hemo: no gum bleeding, no petechial   Neuro: no sensation changes, no focal weakness   Endo: no polydipsia   Psych: denied depression     Past Medical History:   Diagnosis Date    Bipolar 1 disorder, depressed ([address]     Bipolar disorder ([address]     Depression     Diabetes ([address]     DKA, type 1 ([address] 1/27/2013    diagnosed age 23    H/O noncompliance with medical treatment, presenting hazards to health     MRSA (methicillin resistant staph aureus) culture positive     MRSA (methicillin resistant Staphylococcus aureus)     Face    Noncompliance with medication regimen     Second hand smoke exposure     Seizure ([address]     Seizures ([address] 2006 or 2007    one episode during alf      Past Surgical History:   Procedure Laterality Date    HX HEENT      top left wisdom tooth    HX ORTHOPAEDIC Left     wrist; MCV    UPPER GI ENDOSCOPY,BIOPSY  11/20/2018          Prior to Admission medications    Medication Sig Start Date End Date Taking? Authorizing Provider   insulin NPH/insulin regular (NOVOLIN 70/30 U-100 INSULIN) 100 unit/mL (70-30) injection 15 Units by SubCUTAneous route Before breakfast and dinner. 1/5/20   Reyna Nunez MD   OTHER Insulin needles,Alcohol swabs, syringes - for 1 month 1/5/20   Reyna Nunez MD   [facility]) 0.4 mg capsule Take 1 Cap by mouth daily. 1/6/20   Reyna Nunez MD   insulin regular (NOVOLIN R) 100 unit/mL injection 2-10 Units by SubCUTAneous route Before breakfast, lunch, and dinner. Blood Glucose (mg/dL)       Insulin Dose (units)              150-200                               2               201-250                               4               251-300                               6               301-350                               8               >351                                   10    Provider, Historical     No Known Allergies   Family History   Problem Relation Age of Onset    Diabetes Mother     Diabetes Other         neice, type 1         SOCIAL HISTORY:  Patient resides:  Independently x   Assisted Living    SNF    With family care       Smoking history:   None    Former    Chronic x     Alcohol history:   None x   Social    Chronic      Ambulates:   Independently x   w/cane    w/walker    w/wc    CODE STATUS:  DNR    Full x   Other      Objective:     Physical Exam:     Visit Vitals  BP (!) 134/91 (BP 1 Location: Right arm, BP Patient Position: At rest)   Pulse (!) 104   Temp 97.9 °F (36.6 °C)   Resp 18   Ht 5' 9\" (1.753 m)   Wt 60.1 kg (132 lb 7.9 oz)   SpO2 95%   BMI 19.57 kg/m²      O2 Device: Room air    General:  Alert, cooperative, no distress, appears stated age. Head:  Normocephalic, without obvious abnormality, atraumatic. Eyes:  Conjunctivae/corneas clear. PERRL, EOMs intact. Nose: Nares normal. Septum midline. Mucosa normal. No drainage or sinus tenderness.    Throat: Lips, mucosa, and tongue normal. Teeth and gums normal.   Neck: Supple, symmetrical, trachea midline, no adenopathy, thyroid: no enlargement/tenderness/nodules, no carotid bruit and no JVD. Back:   Symmetric, no curvature. ROM normal. No CVA tenderness. Lungs:   Clear to auscultation bilaterally. Chest wall:  No tenderness or deformity. Heart:  Regular rate and rhythm, S1, S2 normal, no murmur, click, rub or gallop. Abdomen:   Soft, non-tender. Bowel sounds normal. No masses,  No organomegaly. BS normal    Extremities: Extremities normal, atraumatic, no cyanosis or edema. Pulses: 2+ and symmetric all extremities. Skin: Skin color, texture, turgor normal. No rashes or lesions   Neurologic: CNII-XII intact. Normal strength            Data Review:     Recent Days:  Recent Labs     01/25/20  1742   WBC 9.9   HGB 14.7   HCT 43.1   *     Recent Labs     01/25/20  1742      K 3.4*   CL 99   CO2 37*   GLU 94   BUN 38*   CREA 1.26   CA 9.0   ALB 2.7*   SGOT 29   ALT 39     No results for input(s): PH, PCO2, PO2, HCO3, FIO2 in the last 72 hours. 24 Hour Results:  Recent Results (from the past 24 hour(s))   CBC WITH AUTOMATED DIFF    Collection Time: 01/25/20  5:42 PM   Result Value Ref Range    WBC 9.9 4.1 - 11.1 K/uL    RBC 4.60 4.10 - 5.70 M/uL    HGB 14.7 12.1 - 17.0 g/dL    HCT 43.1 36.6 - 50.3 %    MCV 93.7 80.0 - 99.0 FL    MCH 32.0 26.0 - 34.0 PG    MCHC 34.1 30.0 - 36.5 g/dL    RDW 14.0 11.5 - 14.5 %    PLATELET 550 (H) 063 - 400 K/uL    MPV 9.7 8.9 - 12.9 FL    NRBC 0.0 0  WBC    ABSOLUTE NRBC 0.00 0.00 - 0.01 K/uL    NEUTROPHILS 74 32 - 75 %    LYMPHOCYTES 15 12 - 49 %    MONOCYTES 10 5 - 13 %    EOSINOPHILS 0 0 - 7 %    BASOPHILS 0 0 - 1 %    IMMATURE GRANULOCYTES 0 0.0 - 0.5 %    ABS. NEUTROPHILS 7.3 1.8 - 8.0 K/UL    ABS. LYMPHOCYTES 1.5 0.8 - 3.5 K/UL    ABS. MONOCYTES 1.0 0.0 - 1.0 K/UL    ABS. EOSINOPHILS 0.0 0.0 - 0.4 K/UL    ABS. BASOPHILS 0.0 0.0 - 0.1 K/UL    ABS. IMM.  GRANS. 0.0 0.00 - 0.04 K/UL    DF AUTOMATED     METABOLIC PANEL, COMPREHENSIVE    Collection Time: 01/25/20  5:42 PM   Result Value Ref Range    Sodium 142 136 - 145 mmol/L    Potassium 3.4 (L) 3.5 - 5.1 mmol/L    Chloride 99 97 - 108 mmol/L    CO2 37 (H) 21 - 32 mmol/L    Anion gap 6 5 - 15 mmol/L    Glucose 94 65 - 100 mg/dL    BUN 38 (H) 6 - 20 MG/DL    Creatinine 1.26 0.70 - 1.30 MG/DL    BUN/Creatinine ratio 30 (H) 12 - 20      GFR est AA >60 >60 ml/min/1.73m2    GFR est non-AA >60 >60 ml/min/1.73m2    Calcium 9.0 8.5 - 10.1 MG/DL    Bilirubin, total 0.3 0.2 - 1.0 MG/DL    ALT (SGPT) 39 12 - 78 U/L    AST (SGOT) 29 15 - 37 U/L    Alk. phosphatase 236 (H) 45 - 117 U/L    Protein, total 6.8 6.4 - 8.2 g/dL    Albumin 2.7 (L) 3.5 - 5.0 g/dL    Globulin 4.1 (H) 2.0 - 4.0 g/dL    A-G Ratio 0.7 (L) 1.1 - 2.2     LIPASE    Collection Time: 01/25/20  5:42 PM   Result Value Ref Range    Lipase 751 (H) 73 - 393 U/L   URINALYSIS W/ REFLEX CULTURE    Collection Time: 01/25/20  6:05 PM   Result Value Ref Range    Color YELLOW/STRAW      Appearance CLEAR CLEAR      Specific gravity 1.020 1.003 - 1.030      pH (UA) 6.0 5.0 - 8.0      Protein >300 (A) NEG mg/dL    Glucose >1,000 (A) NEG mg/dL    Ketone 15 (A) NEG mg/dL    Bilirubin NEGATIVE  NEG      Blood SMALL (A) NEG      Urobilinogen 0.2 0.2 - 1.0 EU/dL    Nitrites NEGATIVE  NEG      Leukocyte Esterase NEGATIVE  NEG      WBC 0-4 0 - 4 /hpf    RBC 5-10 0 - 5 /hpf    Epithelial cells FEW FEW /lpf    Bacteria NEGATIVE  NEG /hpf    UA:UC IF INDICATED CULTURE NOT INDICATED BY UA RESULT      Hyaline cast 0-2 0 - 5 /lpf   SAMPLES BEING HELD    Collection Time: 01/25/20  6:05 PM   Result Value Ref Range    SAMPLES BEING HELD 1RED 1BLUE     COMMENT        Add-on orders for these samples will be processed based on acceptable specimen integrity and analyte stability, which may vary by analyte.    LACTIC ACID    Collection Time: 01/25/20  6:06 PM   Result Value Ref Range    Lactic acid 1.9 0.4 - 2.0 MMOL/L Imaging:   No results found. Assessment:     Active Problems:    FELECIA (acute kidney injury) ([address] (1/2/2020)           Plan:     1. Vomiting: likely due to diabetic gastroparesis. Risk of developing DKA. Will supportive with IVF, prn zofran, clear liquid for now. Iv ppi.   2. IDDM: uncontrolled, no DKA at this time. Start SSI. May give half dose of long acting in am if BG ok, will adjust.   3. Hypokalemia: give iv kcl.         Signed By: Padmini Traore MD     January 25, 2020

## 2020-01-26 NOTE — PROGRESS NOTES
Bedside shift change report given to Julien Macario RN (oncoming nurse) by Elza Monahan RN (offgoing nurse). Report included the following information SBAR, Kardex, Intake/Output and MAR.

## 2020-01-26 NOTE — CONSULTS
[address]   [address] [facility] [address], [address]  [phone]                     GI CONSULTATION NOTE      NAME:  Cierra Ravi   :   1980   MRN:   [id number]     Consult Date: 2020     Chief Complaint: vomiting    History of Present Illness:  Patient is a 42 y.o. M with bipolar disorder and poorly controlled type I DM (history of DKA) who is seen in consultation at the request of Dr. Jaquelin Walsh for vomiting. He notes increased vomiting past few days. No GI bleeding. Weight is overall stable. Bilious emesis. No abdominal pain. No fevers. Regular BMs. He has had some trouble affording insulin. + GERD, only used tums. Hb 12.4. Pt has previously been followed by Dr. Kerrin Bence and Dr. Lj Robles. EGD  with small ulcer distal esophagus  EGD  grade C esophagitis, erosions in stomach    Denies etoh or marijuana. Rare NSAID use. PMH:  Past Medical History:   Diagnosis Date    Bipolar 1 disorder, depressed ([address]     Bipolar disorder ([address]     Depression     Diabetes ([address]     DKA, type 1 ([address] 2013    diagnosed age 23    H/O noncompliance with medical treatment, presenting hazards to health     MRSA (methicillin resistant staph aureus) culture positive     MRSA (methicillin resistant Staphylococcus aureus)     Face    Noncompliance with medication regimen     Second hand smoke exposure     Seizure ([address]     Seizures ([address] 2006 or 2007    one episode during senior living       PSH:  Past Surgical History:   Procedure Laterality Date    HX HEENT      top left wisdom tooth    HX ORTHOPAEDIC Left     wrist; MCV    UPPER GI ENDOSCOPY,BIOPSY  2018            Allergies:  No Known Allergies    Home Medications:  Prior to Admission Medications   Prescriptions Last Dose Informant Patient Reported? Taking?    OTHER   No No   Sig: Insulin needles,Alcohol swabs, syringes - for 1 month   insulin NPH/insulin regular (NOVOLIN 70/30 U-100 INSULIN) 100 unit/mL (70-30) injection   No No   Sig: 15 Units by SubCUTAneous route Before breakfast and dinner. insulin regular (NOVOLIN R) 100 unit/mL injection  Self Yes No   Si-10 Units by SubCUTAneous route Before breakfast, lunch, and dinner. Blood Glucose (mg/dL)       Insulin Dose (units)              150-200                               2               201-250                               4               251-300                               6               301-350                               8               >351                                   10   tamsulosin (FLOMAX) 0.4 mg capsule   No No   Sig: Take 1 Cap by mouth daily.       Facility-Administered Medications: None       Hospital Medications:  Current Facility-Administered Medications   Medication Dose Route Frequency    0.9% sodium chloride with KCl 40 mEq/L infusion   IntraVENous CONTINUOUS    sodium chloride (NS) flush 5-40 mL  5-40 mL IntraVENous Q8H    sodium chloride (NS) flush 5-40 mL  5-40 mL IntraVENous PRN    acetaminophen (TYLENOL) tablet 650 mg  650 mg Oral Q4H PRN    ondansetron (ZOFRAN) injection 4 mg  4 mg IntraVENous Q4H PRN    glucose chewable tablet 16 g  4 Tab Oral PRN    glucagon (GLUCAGEN) injection 1 mg  1 mg IntraMUSCular PRN    dextrose 10% infusion 0-250 mL  0-250 mL IntraVENous PRN    insulin lispro (HUMALOG) injection   SubCUTAneous AC&HS    tamsulosin (FLOMAX) capsule 0.4 mg  0.4 mg Oral DAILY    pantoprazole (PROTONIX) 40 mg in 0.9% sodium chloride 10 mL injection  40 mg IntraVENous Q12H    insulin NPH (NOVOLIN N, HUMULIN N) injection 8 Units  8 Units SubCUTAneous ACB&D       Social History:  Social History     Tobacco Use    Smoking status: Current Some Day Smoker     Packs/day: 0.10     Years: 16.00     Pack years: 1.60     Types: Cigarettes    Smokeless tobacco: Never Used   Substance Use Topics    Alcohol use: No       Family History:  Family History   Problem Relation Age of Onset    Diabetes Mother    24 \[facility] [place] [facility]\"" Diabetes Other         neice, type 1        Review of Systems:    Constitutional: negative for fever, negative chills, negative weight loss  Eyes:   Negative for visual changes  ENT:   Negative for sore throat, tongue or lip swelling  Respiratory:  Negative for cough, negative dyspnea  Cards:  negative for chest pain, palpitations, lower extremity edema  GI:   See HPI  :  negative for frequency, dysuria  Integument:  negative for rash and pruritus  Heme:  negative for easy bruising and gum/nose bleeding  Musculoskel: negative for myalgias,  back pain and muscle weakness  Neuro: negative for headaches, dizziness, vertigo  Psych:  + anxity      Objective:     Patient Vitals for the past 8 hrs:   BP Temp Pulse Resp SpO2   01/26/20 0823 160/86 98.6 °F (37 °C) 96 18 98 %   01/26/20 0422 159/80 98.5 °F (36.9 °C) 90 18 100 %     No intake/output data recorded. PHYSICAL EXAM:  Gen: NAD, dischevelled  HEENT: PEERLA, EOMI  Neck: supple  Chest: CTA bilaterally  CV: RRR, no m/g/c/r  Abd: soft, NT, ND, +BS   Skin: tattoos  Neuro: Moving all extremities, no gross deficits    Data Review   Recent Labs     01/26/20  0440 01/25/20  1742   WBC 7.8 9.9   HGB 12.4 14.7   HCT 37.3 43.1    456*     Recent Labs     01/26/20  0440 01/25/20  1742    142   K 3.6 3.4*    99   CO2 33* 37*   BUN 29* 38*   CREA 0.95 1.26   * 94   PHOS 2.3*  --    CA 7.8* 9.0     Recent Labs     01/26/20  0440 01/25/20  1742   SGOT 22 29   * 236*   TP 5.5* 6.8   ALB 2.2* 2.7*   GLOB 3.3 4.1*   LPSE  --  751*     No results for input(s): INR, PTP, APTT, INREXT in the last 72 hours. CT scan 1/3/10  IMPRESSION:   1. There are extrarenal pelves bilaterally. 2. There is some concentric mural thickening of the bladder wall. Recommend  clinical correlation for outlet obstruction and/or cystitis. 3. There is concentric mural thickening in the large bowel which is mostly  decompressed. Consider the possibility of colitis.   4. Incidental findings as above. Assessment:   43 yo M with bipolar disorder and poorly controlled type I DM (history of DKA) who is seen in consultation at the request of Dr. Elham Jackman for vomiting. Suspect primarily related to delayed gastric empyting in the setting of persistent hyperglycemia. No bleeding. Plan:     - History of esophagitis - he is not taking PPI.  Recommend starting omeprazole 40 mg - send to [facility] on discharge as this should be on 4 dollar list  - In house for now, start reglan 5 IV scheduled and PPI 40 IV q 12  - IVF  - Clear liquids, advance as tolerated  - No acute indication for EGD

## 2020-01-27 VITALS
RESPIRATION RATE: 18 BRPM | BODY MASS INDEX: 19.62 KG/M2 | OXYGEN SATURATION: 98 % | DIASTOLIC BLOOD PRESSURE: 89 MMHG | HEIGHT: 69 IN | TEMPERATURE: 97.9 F | SYSTOLIC BLOOD PRESSURE: 151 MMHG | WEIGHT: 132.5 LBS | HEART RATE: 91 BPM

## 2020-01-27 LAB
BACTERIA SPEC CULT: NORMAL
BACTERIA SPEC CULT: NORMAL
GLUCOSE BLD STRIP.AUTO-MCNC: 196 MG/DL (ref 65–100)
GLUCOSE BLD STRIP.AUTO-MCNC: 313 MG/DL (ref 65–100)
SERVICE CMNT-IMP: ABNORMAL
SERVICE CMNT-IMP: ABNORMAL
SERVICE CMNT-IMP: NORMAL

## 2020-01-27 PROCEDURE — 74011250636 HC RX REV CODE- 250/636: Performed by: HOSPITALIST

## 2020-01-27 PROCEDURE — 74011250636 HC RX REV CODE- 250/636: Performed by: INTERNAL MEDICINE

## 2020-01-27 PROCEDURE — 74011636637 HC RX REV CODE- 636/637: Performed by: HOSPITALIST

## 2020-01-27 PROCEDURE — 82962 GLUCOSE BLOOD TEST: CPT

## 2020-01-27 PROCEDURE — 74011000250 HC RX REV CODE- 250: Performed by: HOSPITALIST

## 2020-01-27 PROCEDURE — 74011250637 HC RX REV CODE- 250/637: Performed by: HOSPITALIST

## 2020-01-27 PROCEDURE — C9113 INJ PANTOPRAZOLE SODIUM, VIA: HCPCS | Performed by: HOSPITALIST

## 2020-01-27 RX ORDER — SODIUM CHLORIDE 9 MG/ML
75 INJECTION, SOLUTION INTRAVENOUS CONTINUOUS
Status: DISCONTINUED | OUTPATIENT
Start: 2020-01-27 | End: 2020-01-27 | Stop reason: HOSPADM

## 2020-01-27 RX ORDER — PANTOPRAZOLE SODIUM 40 MG/1
40 TABLET, DELAYED RELEASE ORAL 2 TIMES DAILY
Qty: 60 TAB | Refills: 0 | Status: SHIPPED | OUTPATIENT
Start: 2020-01-27 | End: 2021-11-15 | Stop reason: SDUPTHER

## 2020-01-27 RX ORDER — METOCLOPRAMIDE 5 MG/1
5 TABLET ORAL
Qty: 6 TAB | Refills: 0 | Status: SHIPPED | OUTPATIENT
Start: 2020-01-27 | End: 2020-01-30

## 2020-01-27 RX ADMIN — METOCLOPRAMIDE 5 MG: 5 INJECTION, SOLUTION INTRAMUSCULAR; INTRAVENOUS at 07:15

## 2020-01-27 RX ADMIN — INSULIN LISPRO 7 UNITS: 100 INJECTION, SOLUTION INTRAVENOUS; SUBCUTANEOUS at 12:14

## 2020-01-27 RX ADMIN — INSULIN LISPRO 2 UNITS: 100 INJECTION, SOLUTION INTRAVENOUS; SUBCUTANEOUS at 07:15

## 2020-01-27 RX ADMIN — HUMAN INSULIN 8 UNITS: 100 INJECTION, SUSPENSION SUBCUTANEOUS at 07:15

## 2020-01-27 RX ADMIN — Medication 10 ML: at 07:16

## 2020-01-27 RX ADMIN — SODIUM CHLORIDE 40 MG: 9 INJECTION INTRAMUSCULAR; INTRAVENOUS; SUBCUTANEOUS at 09:45

## 2020-01-27 RX ADMIN — SODIUM CHLORIDE 75 ML/HR: 900 INJECTION, SOLUTION INTRAVENOUS at 11:32

## 2020-01-27 RX ADMIN — TAMSULOSIN HYDROCHLORIDE 0.4 MG: 0.4 CAPSULE ORAL at 09:44

## 2020-01-27 NOTE — WOUND CARE
Wound Consult:  New Patient Visit. Chart reviewed. Consulted for right great toe and toe nail on left 5th toe came off. Patient is resting on [employer] bed. Patient with DM; had a blood blister come up on right great toe a while back. Assessment:  Right medial great toe - 1 x 1 cm dry brown stable appearing eschar/callus; no surrounding redness, no drainage or odor. DFU. Patient works as . He wears work boots but takes extra socks and boots to ensure his feet stay dry. He wasn't sure how it developed - discussed with him often it comes up from friction within sock/shoe. Left 5th toe - nail came off, no active bleeding, no redness or warmth. No other issues with either foot noted. Treatment:  Washed left 5th toe with wound cleanser, left GURVINDER. Right great toe moisturized - GURVINDER. Wound Recommendations:  No local wound care indicated at this time - discussed with patient signs of infection; he does not have insurance so is limited on following with podiatrist or taking some medications. Handed off to care manager that patient filled out paper work for care card and has not heard back to date. Discussed continuing to keep feet clean/dry and check them daily. Plan:  Possible discharge per patient who would like to go home; we will sign off; please re-consult if needed.   Jillian Gupta, [address] Office [phone]  Pager [phone]

## 2020-01-27 NOTE — PHYSICIAN ADVISORY
Letter of Status Determination: Current Status   INPATIENT is Appropriate         Pt Name:  Analia Wright   MR#  [id number]   [facility]#   [id number]   [address] [place] [address]  121/77  @ [address]   Hospitalization date  1/25/2020  5:31 PM   Current Attending Physician  Austin Sacks, MD   Principal diagnosis  <principal problem not specified>    Diamond Arce is a 42 y.o. male who presents with vomiting      42 WM with history of biplar, IDDM and seizure dz, presented to [facility] with   3-day history of vomiting. [facility] thinks he is vomiting more than 10 times per day. [facility] is an insulin-dependent diabetic but does not follow his blood sugar.  He was admitted to the hospital earlier this month for DKA.  denied any He also admits to having abdominal pain.  He denies fever, diarrhea, chest pain, cough, shortness of breath or other complaints.   Had similar vomiting in the past.      [place] MCG criteria    MCG  Vomiting (M-370)  Inability to maintain oral hydration (eg, needs IV fluid support) that persists after emergency department and observation care treatment   STATUS DETERMINATION  On the basis of clinical data, available documentation, we believe that the current status of this patient as INPATIENT is Appropriate    Additional comments     Insurance  Payor: SELF PAY / Plan: [employer] / Product Type: Self Pay /    Isa Vogt PAY/[employer] SELF PAY Phone: [phone]    Subscriber: Isaac Zamora Subscriber#: [id number]    Group#:  Precert#:                  Darryl Zarco MD, [facility]   Physician Lenette Ek Dr. Bertis Blizzard, [address] [place] [address]

## 2020-01-27 NOTE — DISCHARGE INSTRUCTIONS
Discharge Instructions       PATIENT ID: Kirsten Alonso  MRN: [id number]   YOB: 1980    DATE OF ADMISSION: 1/25/2020  5:31 PM    DATE OF DISCHARGE: 1/27/2020    PRIMARY CARE PROVIDER: None     ATTENDING PHYSICIAN: Delmy Vargas MD  DISCHARGING PROVIDER: Valla Saint, MD    To contact this individual call [phone] and ask the  to page. If unavailable ask to be transferred the Adult Hospitalist Department. DISCHARGE DIAGNOSES nausea and vomiting     CONSULTATIONS: IP CONSULT TO HOSPITALIST  IP CONSULT TO GASTROENTEROLOGY    PROCEDURES/SURGERIES: * No surgery found *      FOLLOW UP APPOINTMENTS:   Follow-up Information     Follow up With Specialties Details [facility] Family [facility]   [address] [facility] [address]    Sallyann Riedel, MD Gastroenterology In 1 week  [address] [place], [place] [id number]             ADDITIONAL CARE RECOMMENDATIONS:   Follow up with PCP within a week  Follow up with your gastroenterologist as scheduled     DIET: Diabetic Diet    ACTIVITY: Activity as tolerated        DISCHARGE MEDICATIONS:   See Medication Reconciliation Form    · It is important that you take the medication exactly as they are prescribed. · Keep your medication in the bottles provided by the pharmacist and keep a list of the medication names, dosages, and times to be taken in your wallet. · Do not take other medications without consulting your doctor. NOTIFY YOUR PHYSICIAN FOR ANY OF THE FOLLOWING:   Fever over 101 degrees for 24 hours. Chest pain, shortness of breath, fever, chills, nausea, vomiting, diarrhea, change in mentation, falling, weakness, bleeding. Severe pain or pain not relieved by medications. Or, any other signs or symptoms that you may have questions about.       DISPOSITION:    Home With:   OT  PT  [facility]  RN       SNF/Inpatient Rehab/LTAC Independent/assisted living    Hospice    Other:     CDMP Checked:   Yes ***     PROBLEM LIST Updated:  Yes ***       Information obtained by :   I understand that if any problems occur once I am at home I am to contact my physician. I understand and acknowledge receipt of the instructions indicated above.                                                                                                                                              Physician's or R.N.'s Signature                                                                  Date/Time                                                                                                                                              Patient or Representative Signature                                                          Date/Time          Signed:   Valla Saint, MD  1/27/2020  12:01 PM

## 2020-01-27 NOTE — PROGRESS NOTES
Bedside and Verbal shift change report given to Norah Boss RN (oncoming nurse) by Chen Cui RN (offgoing nurse). Report included the following information SBAR, Kardex, ED Summary, Intake/Output, MAR and Recent Results.

## 2020-01-27 NOTE — DISCHARGE SUMMARY
Inpatient hospitalist discharge summary                Brief Overview    PATIENT ID: Zoran Silvestre    MRN: [id number]     YOB: 1980    Admitting Provider: Bonny Pollard MD    Discharging Provider: Jc Ugarte MD   To contact this individual call [phone] and ask the  to page. If unavailable ask to be transferred the Adult Hospitalist Department. PCP at discharge: None None   None ([phone]) Patient stated that they have no PCP    Admission date: 1/25/2020  Date of Discharge: 01/27/20    Chief complaint:   Chief Complaint   Patient presents with    Nausea     Patient Active Problem List   Diagnosis Code    HTN (hypertension) I10    Tobacco abuse Z72.0    Diabetic neuropathy, type I diabetes mellitus ([facility] E10.40    DKA, type 1 ([address] E10.10    Neuropathy G62.9    Upper GI bleed K92.2    DKA (diabetic ketoacidoses) ([facility] E11.10    Hydronephrosis of right kidney N13.30    FELECIA (acute kidney injury) ([facility] N17.9         Discharge diagnosis, hospital course/plan:  42 WM with history of biplar, IDDM and seizure dz, presented to [facility] ER with   3-day history of vomiting. Reginaldo Carr thinks he is vomiting more than 10 times per day. Reginaldo Carr is an insulin-dependent diabetic but does not follow his blood sugar.  He was admitted to the hospital earlier this month for DKA.   denied any He also admits to having abdominal pain.  He denies fever, diarrhea, chest pain, cough, shortness of breath or other complaints. Had similar vomiting in the past.     1. Vomiting: resolved   On protonix      2. IDDM:   Stable      3. Hypokalemia:   Resolved        On the date of discharge, diagnostic face to face encounter was performed. Patient was hemodynamically stable, offering no new complaints. Denies any shortness of breath at rest, no fevers or chills, no diarrhea or constipation. Patient is agreeable for discharge.  Patient understood and verbalized the understanding of the discharge plan.    Patient was advised to seek medical help/ care or return to ED, if symptoms recur, worsen or new symptoms develop. Discharge Disposition:  Home or Self Care    Discharge activity:  Activity as tolerated    Code status at discharge:  Full Code     Outpatient follow up:  Please follow up with your PCP in one week  In addition follow up with Gastroenterology       Future appointments-  No future appointments. Follow-up Information     Follow up With Specialties Details Why [facility]   [address] [place] [address] [address]    Olimpia Hernandez MD Gastroenterology In 1 week  [address] [phone]      None    None (898) Patient stated that they have no PCP            Operative procedures performed:      Consults: IP CONSULT TO HOSPITALIST  IP CONSULT TO GASTROENTEROLOGY    Procedures:  * No surgery found *    Diet:  DIET NUTRITIONAL SUPPLEMENTS  DIET DIABETIC CONSISTENT CARB    Pertinent test results:  Xr Abd Acute W 1 V Chest    Result Date: 1/2/2020  EXAM:  XR ABD ACUTE W 1 V CHEST INDICATION:  N/V. Additional history: Emesis and hyperglycemia COMPARISON: None. Reuben Marshall FINDINGS: The upright chest radiograph demonstrates no focal consolidation. No visible pneumothorax or pleural effusion. Cardiac monitor leads overly the patient. Nipple adornment. . Supine and upright views of the abdomen demonstrate a paucity of bowel gas without evidence of acute process. No visible pneumoperitoneum. The bones and soft tissues are within normal limits. .    IMPRESSION: 1. No evidence of acute process. Ct Abd Pelv W Wo Cont    Result Date: 1/3/2020  EXAM:  CT ABDOMEN PELVIS WITH CONTRAST INDICATION:  Hydronephrosis. Additional history: COMPARISON: CT of the abdomen and pelvis, 11/20/2018 and 10/29/2019. Ultrasound renal, 1/2/2020. Reuben Marshall  TECHNIQUE: Multislice helical CT was performed from the diaphragm to the symphysis pubis before and after  intravenous contrast administration. Contiguous 5 mm axial images were reconstructed and lung and soft tissue windows were generated. Coronal and sagittal reformations were generated. CT dose reduction was achieved through use of a standardized protocol tailored for this examination and automatic exposure control for dose modulation. Evelyn Mesa FINDINGS: INCIDENTALLY IMAGED CHEST: Heart/vessels: Within normal limits. Lungs/Pleura: Within normal limits. . ABDOMEN: Liver: Within normal limits. Gallbladder/Biliary: Within normal limits. Spleen: Within normal limits. Splenule Pancreas: Within normal limits. Adrenals: Within normal limits. Kidneys: Extrarenal pelves bilaterally. Peritoneum/Mesenteries: Within normal limits. Extraperitoneum: Within normal limits. Gastrointestinal tract: Hiatal hernia. Concentric mural thickening throughout the large bowel which is relatively decompressed. Normal-appearing appendix. Vascular: Within normal limits. Circumaortic left renal vein . PELVIS: Extraperitoneum: Within normal limits. Ureters: Within normal limits. Bladder: The bladder is only partially opacified with contrast material on delayed phase imaging. There is no visible filling defect. The bladder is slightly distended with concentric mural thickening Reproductive System: Within normal limits. . MSK: Within normal limits. .    IMPRESSION: 1. There are extrarenal pelves bilaterally. 2. There is some concentric mural thickening of the bladder wall. Recommend clinical correlation for outlet obstruction and/or cystitis. 3. There is concentric mural thickening in the large bowel which is mostly decompressed. Consider the possibility of colitis. 4. Incidental findings as above. Us Retroperitoneum Comp    Result Date: 1/2/2020  EXAM:  US RETROPERITONEUM COMP INDICATION:  r/o obstruction. Additional history: COMPARISON: Renal ultrasound, 10/31/2019.  CT of the abdomen and pelvis, 10/29/2019 . TECHNIQUE: Real-time sonography of the kidneys, retroperitoneum and bladder was performed with multiple static images obtained. Katherene Means FINDINGS: The kidneys have normal echogenicity with no mass or stone. Significant, right-sided hydronephrosis with the renal pelvis measuring up to 5.3 cm The right kidney measures cm and the left kidney measures 14.9 cm in length. . The aorta tapers normally. The proximal iliac arteries are not well visualized due to body habitus. The IVC is normal. No retroperitoneal mass is identified. . The urinary bladder is markedly distended . IMPRESSION: 1. Significant, right-sided hydronephrosis. 2. Markedly distended bladder. Recent Results (from the past 168 hour(s))   CBC WITH AUTOMATED DIFF    Collection Time: 01/25/20  5:42 PM   Result Value Ref Range    WBC 9.9 4.1 - 11.1 K/uL    RBC 4.60 4.10 - 5.70 M/uL    HGB 14.7 12.1 - 17.0 g/dL    HCT 43.1 36.6 - 50.3 %    MCV 93.7 80.0 - 99.0 FL    MCH 32.0 26.0 - 34.0 PG    MCHC 34.1 30.0 - 36.5 g/dL    RDW 14.0 11.5 - 14.5 %    PLATELET 092 (H) 575 - 400 K/uL    MPV 9.7 8.9 - 12.9 FL    NRBC 0.0 0  WBC    ABSOLUTE NRBC 0.00 0.00 - 0.01 K/uL    NEUTROPHILS 74 32 - 75 %    LYMPHOCYTES 15 12 - 49 %    MONOCYTES 10 5 - 13 %    EOSINOPHILS 0 0 - 7 %    BASOPHILS 0 0 - 1 %    IMMATURE GRANULOCYTES 0 0.0 - 0.5 %    ABS. NEUTROPHILS 7.3 1.8 - 8.0 K/UL    ABS. LYMPHOCYTES 1.5 0.8 - 3.5 K/UL    ABS. MONOCYTES 1.0 0.0 - 1.0 K/UL    ABS. EOSINOPHILS 0.0 0.0 - 0.4 K/UL    ABS. BASOPHILS 0.0 0.0 - 0.1 K/UL    ABS. IMM.  GRANS. 0.0 0.00 - 0.04 K/UL    DF AUTOMATED     METABOLIC PANEL, COMPREHENSIVE    Collection Time: 01/25/20  5:42 PM   Result Value Ref Range    Sodium 142 136 - 145 mmol/L    Potassium 3.4 (L) 3.5 - 5.1 mmol/L    Chloride 99 97 - 108 mmol/L    CO2 37 (H) 21 - 32 mmol/L    Anion gap 6 5 - 15 mmol/L    Glucose 94 65 - 100 mg/dL    BUN 38 (H) 6 - 20 MG/DL    Creatinine 1.26 0.70 - 1.30 MG/DL    BUN/Creatinine ratio 30 (H) 12 - 20      GFR est AA >60 >60 ml/min/1.73m2    GFR est non-AA >60 >60 ml/min/1.73m2    Calcium 9.0 8.5 - 10.1 MG/DL    Bilirubin, total 0.3 0.2 - 1.0 MG/DL    ALT (SGPT) 39 12 - 78 U/L    AST (SGOT) 29 15 - 37 U/L    Alk. phosphatase 236 (H) 45 - 117 U/L    Protein, total 6.8 6.4 - 8.2 g/dL    Albumin 2.7 (L) 3.5 - 5.0 g/dL    Globulin 4.1 (H) 2.0 - 4.0 g/dL    A-G Ratio 0.7 (L) 1.1 - 2.2     LIPASE    Collection Time: 01/25/20  5:42 PM   Result Value Ref Range    Lipase 751 (H) 73 - 393 U/L   URINALYSIS W/ REFLEX CULTURE    Collection Time: 01/25/20  6:05 PM   Result Value Ref Range    Color YELLOW/STRAW      Appearance CLEAR CLEAR      Specific gravity 1.020 1.003 - 1.030      pH (UA) 6.0 5.0 - 8.0      Protein >300 (A) NEG mg/dL    Glucose >1,000 (A) NEG mg/dL    Ketone 15 (A) NEG mg/dL    Bilirubin NEGATIVE  NEG      Blood SMALL (A) NEG      Urobilinogen 0.2 0.2 - 1.0 EU/dL    Nitrites NEGATIVE  NEG      Leukocyte Esterase NEGATIVE  NEG      WBC 0-4 0 - 4 /hpf    RBC 5-10 0 - 5 /hpf    Epithelial cells FEW FEW /lpf    Bacteria NEGATIVE  NEG /hpf    UA:UC IF INDICATED CULTURE NOT INDICATED BY UA RESULT      Hyaline cast 0-2 0 - 5 /lpf   SAMPLES BEING HELD    Collection Time: 01/25/20  6:05 PM   Result Value Ref Range    SAMPLES BEING HELD 1RED 1BLUE     COMMENT        Add-on orders for these samples will be processed based on acceptable specimen integrity and analyte stability, which may vary by analyte.    LACTIC ACID    Collection Time: 01/25/20  6:06 PM   Result Value Ref Range    Lactic acid 1.9 0.4 - 2.0 MMOL/L   GLUCOSE, POC    Collection Time: 01/25/20 10:59 PM   Result Value Ref Range    Glucose (POC) 58 (L) 65 - 100 mg/dL    Performed by Inez Aguilar, POC    Collection Time: 01/25/20 11:16 PM   Result Value Ref Range    Glucose (POC) 61 (L) 65 - 100 mg/dL    Performed by Inez Aguilar, POC    Collection Time: 01/25/20 11:58 PM   Result Value Ref Range    Glucose (POC) 131 (H) 65 - 100 mg/dL    Performed by Gabbi Gilbert    CBC WITH AUTOMATED DIFF    Collection Time: 01/26/20  4:40 AM   Result Value Ref Range    WBC 7.8 4.1 - 11.1 K/uL    RBC 3.88 (L) 4.10 - 5.70 M/uL    HGB 12.4 12.1 - 17.0 g/dL    HCT 37.3 36.6 - 50.3 %    MCV 96.1 80.0 - 99.0 FL    MCH 32.0 26.0 - 34.0 PG    MCHC 33.2 30.0 - 36.5 g/dL    RDW 13.8 11.5 - 14.5 %    PLATELET 497 373 - 670 K/uL    MPV 9.6 8.9 - 12.9 FL    NRBC 0.0 0  WBC    ABSOLUTE NRBC 0.00 0.00 - 0.01 K/uL    NEUTROPHILS 63 32 - 75 %    LYMPHOCYTES 27 12 - 49 %    MONOCYTES 9 5 - 13 %    EOSINOPHILS 1 0 - 7 %    BASOPHILS 0 0 - 1 %    IMMATURE GRANULOCYTES 0 0.0 - 0.5 %    ABS. NEUTROPHILS 4.9 1.8 - 8.0 K/UL    ABS. LYMPHOCYTES 2.1 0.8 - 3.5 K/UL    ABS. MONOCYTES 0.7 0.0 - 1.0 K/UL    ABS. EOSINOPHILS 0.1 0.0 - 0.4 K/UL    ABS. BASOPHILS 0.0 0.0 - 0.1 K/UL    ABS. IMM. GRANS. 0.0 0.00 - 0.04 K/UL    DF AUTOMATED     METABOLIC PANEL, COMPREHENSIVE    Collection Time: 01/26/20  4:40 AM   Result Value Ref Range    Sodium 140 136 - 145 mmol/L    Potassium 3.6 3.5 - 5.1 mmol/L    Chloride 103 97 - 108 mmol/L    CO2 33 (H) 21 - 32 mmol/L    Anion gap 4 (L) 5 - 15 mmol/L    Glucose 149 (H) 65 - 100 mg/dL    BUN 29 (H) 6 - 20 MG/DL    Creatinine 0.95 0.70 - 1.30 MG/DL    BUN/Creatinine ratio 31 (H) 12 - 20      GFR est AA >60 >60 ml/min/1.73m2    GFR est non-AA >60 >60 ml/min/1.73m2    Calcium 7.8 (L) 8.5 - 10.1 MG/DL    Bilirubin, total 0.3 0.2 - 1.0 MG/DL    ALT (SGPT) 28 12 - 78 U/L    AST (SGOT) 22 15 - 37 U/L    Alk.  phosphatase 185 (H) 45 - 117 U/L    Protein, total 5.5 (L) 6.4 - 8.2 g/dL    Albumin 2.2 (L) 3.5 - 5.0 g/dL    Globulin 3.3 2.0 - 4.0 g/dL    A-G Ratio 0.7 (L) 1.1 - 2.2     MAGNESIUM    Collection Time: 01/26/20  4:40 AM   Result Value Ref Range    Magnesium 2.2 1.6 - 2.4 mg/dL   PHOSPHORUS    Collection Time: 01/26/20  4:40 AM   Result Value Ref Range    Phosphorus 2.3 (L) 2.6 - 4.7 MG/DL   CULTURE, MRSA    Collection Time: 01/26/20  4:40 AM   Result Value Ref Range    Special Requests: NO SPECIAL REQUESTS      Culture result: MRSA NOT PRESENT      Culture result:            Screening of patient nares for MRSA is for surveillance purposes and, if positive, to facilitate isolation considerations in high risk settings. It is not intended for automatic decolonization interventions per se as regimens are not sufficiently effective to warrant routine use.    GLUCOSE, POC    Collection Time: 01/26/20  5:46 AM   Result Value Ref Range    Glucose (POC) 153 (H) 65 - 100 mg/dL    Performed by Inez Aguilar, POC    Collection Time: 01/26/20 11:25 AM   Result Value Ref Range    Glucose (POC) 268 (H) 65 - 100 mg/dL    Performed by Uvaldo, POC    Collection Time: 01/26/20  4:18 PM   Result Value Ref Range    Glucose (POC) 140 (H) 65 - 100 mg/dL    Performed by Hussein MARTINEZ    GLUCOSE, POC    Collection Time: 01/26/20  6:52 PM   Result Value Ref Range    Glucose (POC) 332 (H) 65 - 100 mg/dL    Performed by Hussein MARTINEZ    GLUCOSE, POC    Collection Time: 01/26/20  9:33 PM   Result Value Ref Range    Glucose (POC) 256 (H) 65 - 100 mg/dL    Performed by Kimberly, POC    Collection Time: 01/27/20  7:08 AM   Result Value Ref Range    Glucose (POC) 196 (H) 65 - 100 mg/dL    Performed by Kimberly, POC    Collection Time: 01/27/20 11:51 AM   Result Value Ref Range    Glucose (POC) 313 (H) 65 - 100 mg/dL    Performed by Dominik Flores            Physical Exam on Discharge:    Discharge condition: good    Vital signs:   Patient Vitals for the past 12 hrs:   Temp Pulse Resp BP SpO2   01/27/20 1134 -- 91 -- 151/89 --   01/27/20 0816 97.9 °F (36.6 °C) 89 18 (!) 154/102 98 %       Visit Vitals  /89   Pulse 91   Temp 97.9 °F (36.6 °C)   Resp 18   Ht 5' 9\" (1.753 m)   Wt 60.1 kg (132 lb 7.9 oz)   SpO2 98%   BMI 19.57 kg/m²     General:  Alert, cooperative, no distress, appears stated age.   Head:  Normocephalic, without obvious abnormality, atraumatic. Lungs:   Clear to auscultation bilaterally. Chest wall:  No tenderness or deformity. Heart:  Regular rate and rhythm, S1, S2 normal, no murmur, click, rub or gallop. Abdomen:   Soft, non-tender. Bowel sounds normal. No masses,  No organomegaly. Extremities: Extremities normal, atraumatic, no cyanosis or edema. Pulses: 2+ and symmetric all extremities. Neurologic: CNII-XII intact. Normal strength, sensation and reflexes throughout. Current Discharge Medication List      START taking these medications    Details   pantoprazole (PROTONIX) 40 mg tablet Take 1 Tab by mouth two (2) times a day. Qty: 60 Tab, Refills: 0      metoclopramide HCl (REGLAN) 5 mg tablet Take 1 Tab by mouth two (2) times daily as needed for Nausea for up to 3 days. Qty: 6 Tab, Refills: 0         CONTINUE these medications which have NOT CHANGED    Details   insulin NPH/insulin regular (NOVOLIN 70/30 U-100 INSULIN) 100 unit/mL (70-30) injection 15 Units by SubCUTAneous route Before breakfast and dinner. Qty: 10 mL, Refills: 1      OTHER Insulin needles,Alcohol swabs, syringes - for 1 month  Qty: 30 Each, Refills: 1      tamsulosin (FLOMAX) 0.4 mg capsule Take 1 Cap by mouth daily. Qty: 30 Cap, Refills: 1      insulin regular (NOVOLIN R) 100 unit/mL injection 2-10 Units by SubCUTAneous route Before breakfast, lunch, and dinner.  Blood Glucose (mg/dL)       Insulin Dose (units)              150-200                               2               201-250                               4               251-300                               6               301-350                               8               >351                                   10               Total time spent on discharge planning, counseling and co-ordination of care:   35 minutes    Mary Teixeira MD  01/27/20  11:58 AM

## 2020-01-27 NOTE — PROGRESS NOTES
[address]  [address] [facility] [address]   [phone]                GI PROGRESS NOTE  Macrelino Johnston, [facility]  Work Cell: [phone]      NAME:   Amisha Webster   :    1980   MRN:    [id number]     Assessment/Plan   1. Vomiting - suspect this is related to delayed gastric emptying in setting of hyperglycemia. Has hx of esophagitis. Symptoms resolved. - Diet as tolerated  - Supportive management per primary team  - Continue PPI BID - switch to PO upon discharge  - Reglan while inpatient  - No acute indication for EGD given sx resolved     2. Type 1 DM - poorly controlled, last Hgb A1C 13.7.   3. Bipolar disorder     Will see PRN. Please call with any questions. Patient Active Problem List   Diagnosis Code    HTN (hypertension) I10    Tobacco abuse Z72.0    Diabetic neuropathy, type I diabetes mellitus ([address] E10.40    DKA, type 1 ([address] E10.10    Neuropathy G62.9    Upper GI bleed K92.2    DKA (diabetic ketoacidoses) ([facility] E11.10    Hydronephrosis of right kidney N13.30    FELECIA (acute kidney injury) ([address] N17.9       Subjective:     Feeling much better. Denies any n/v or abdominal pain. Candance Corning to go home. Objective:     VITALS:   Last 24hrs VS reviewed since prior hospitalist progress note.  Most recent are:  Visit Vitals  BP (!) 154/102   Pulse 89   Temp 97.9 °F (36.6 °C)   Resp 18   Ht 5' 9\" (1.753 m)   Wt 60.1 kg (132 lb 7.9 oz)   SpO2 98%   BMI 19.57 kg/m²       Intake/Output Summary (Last 24 hours) at 2020 1017  Last data filed at 2020 0450  Gross per 24 hour   Intake 600 ml   Output 1650 ml   Net -1050 ml        PHYSICAL EXAM:  General   well developed, thin, alert, in no acute distress  EENT  Normocephalic, Atraumatic, PERRLA, EOMI, sclera clear  Respiratory   Clear To Auscultation bilaterally - no wheezes, rales, rhonchi, or crackles  Cardiology  Regular Rate and Rythmn  - no murmurs, rubs or gallops  Abdominal  Soft, non-tender, non-distended, positive bowel sounds, no hepatosplenomegaly, no palpable mass  Neurological  No focal neurological deficits noted  Psychological  Oriented x 3. Normal affect.        Lab Data   Recent Results (from the past 12 hour(s))   GLUCOSE, POC    Collection Time: 01/27/20  7:08 AM   Result Value Ref Range    Glucose (POC) 196 (H) 65 - 100 mg/dL    Performed by Pantera Lunsford          Medications: Reviewed    PMH/ reviewed - no change compared to H&P  Mid-Level Provider: Tatum Dykes NP   Date/Time:  1/27/2020

## 2020-01-27 NOTE — PROGRESS NOTES
Bedside shift change report given to Noa Jacinto (oncoming nurse) by Deanne Wills RN (offgoing nurse). Report included the following information SBAR, Kardex, Intake/Output and MAR.

## 2020-03-07 ENCOUNTER — APPOINTMENT (OUTPATIENT)
Dept: GENERAL RADIOLOGY | Age: 40
DRG: 420 | End: 2020-03-07
Attending: EMERGENCY MEDICINE
Payer: MEDICAID

## 2020-03-07 ENCOUNTER — APPOINTMENT (OUTPATIENT)
Dept: GENERAL RADIOLOGY | Age: 40
DRG: 420 | End: 2020-03-07
Attending: ANESTHESIOLOGY
Payer: MEDICAID

## 2020-03-07 ENCOUNTER — HOSPITAL ENCOUNTER (INPATIENT)
Age: 40
LOS: 4 days | Discharge: HOME | DRG: 420 | End: 2020-03-11
Attending: EMERGENCY MEDICINE | Admitting: INTERNAL MEDICINE
Payer: MEDICAID

## 2020-03-07 DIAGNOSIS — T68.XXXA HYPOTHERMIA, INITIAL ENCOUNTER: Primary | ICD-10-CM

## 2020-03-07 DIAGNOSIS — E87.20 ACIDOSIS, METABOLIC: ICD-10-CM

## 2020-03-07 DIAGNOSIS — N17.9 AKI (ACUTE KIDNEY INJURY) (HCC): ICD-10-CM

## 2020-03-07 DIAGNOSIS — E10.10 DKA, TYPE 1, NOT AT GOAL (HCC): ICD-10-CM

## 2020-03-07 LAB
ADMINISTERED INITIALS, ADMINIT: NORMAL
ALBUMIN SERPL-MCNC: 2.2 G/DL (ref 3.5–5)
ALBUMIN/GLOB SERPL: 0.5 {RATIO} (ref 1.1–2.2)
ALP SERPL-CCNC: 235 U/L (ref 45–117)
ALT SERPL-CCNC: 31 U/L (ref 12–78)
ANION GAP SERPL CALC-SCNC: 29 MMOL/L (ref 5–15)
ANION GAP SERPL CALC-SCNC: 29 MMOL/L (ref 5–15)
ANION GAP SERPL CALC-SCNC: ABNORMAL MMOL/L (ref 5–15)
APPEARANCE UR: CLEAR
ARTERIAL PATENCY WRIST A: YES
AST SERPL-CCNC: 17 U/L (ref 15–37)
BACTERIA URNS QL MICRO: NEGATIVE /HPF
BASOPHILS # BLD: 0.1 K/UL (ref 0–0.1)
BASOPHILS NFR BLD: 1 % (ref 0–1)
BDY SITE: ABNORMAL
BILIRUB SERPL-MCNC: 0.6 MG/DL (ref 0.2–1)
BILIRUB UR QL: NEGATIVE
BUN SERPL-MCNC: 41 MG/DL (ref 6–20)
BUN SERPL-MCNC: 42 MG/DL (ref 6–20)
BUN SERPL-MCNC: 45 MG/DL (ref 6–20)
BUN/CREAT SERPL: 25 (ref 12–20)
BUN/CREAT SERPL: 25 (ref 12–20)
BUN/CREAT SERPL: 26 (ref 12–20)
CA-I BLD-SCNC: 1.16 MMOL/L (ref 1.12–1.32)
CALCIUM SERPL-MCNC: 7.7 MG/DL (ref 8.5–10.1)
CALCIUM SERPL-MCNC: 8.3 MG/DL (ref 8.5–10.1)
CALCIUM SERPL-MCNC: 8.3 MG/DL (ref 8.5–10.1)
CHLORIDE SERPL-SCNC: 81 MMOL/L (ref 97–108)
CHLORIDE SERPL-SCNC: 88 MMOL/L (ref 97–108)
CHLORIDE SERPL-SCNC: 90 MMOL/L (ref 97–108)
CO2 SERPL-SCNC: 6 MMOL/L (ref 21–32)
CO2 SERPL-SCNC: 7 MMOL/L (ref 21–32)
CO2 SERPL-SCNC: <5 MMOL/L (ref 21–32)
COLOR UR: ABNORMAL
CREAT SERPL-MCNC: 1.64 MG/DL (ref 0.7–1.3)
CREAT SERPL-MCNC: 1.65 MG/DL (ref 0.7–1.3)
CREAT SERPL-MCNC: 1.77 MG/DL (ref 0.7–1.3)
D50 ADMINISTERED, D50ADM: 0 ML
D50 ORDER, D50ORD: 0 ML
DIFFERENTIAL METHOD BLD: ABNORMAL
EOSINOPHIL # BLD: 0.2 K/UL (ref 0–0.4)
EOSINOPHIL NFR BLD: 2 % (ref 0–7)
EPITH CASTS URNS QL MICRO: ABNORMAL /LPF
ERYTHROCYTE [DISTWIDTH] IN BLOOD BY AUTOMATED COUNT: 12.9 % (ref 11.5–14.5)
GAS FLOW.O2 O2 DELIVERY SYS: ABNORMAL L/MIN
GLOBULIN SER CALC-MCNC: 4.2 G/DL (ref 2–4)
GLSCOM COMMENTS: NORMAL
GLUCOSE BLD STRIP.AUTO-MCNC: 414 MG/DL (ref 65–100)
GLUCOSE BLD STRIP.AUTO-MCNC: 520 MG/DL (ref 65–100)
GLUCOSE BLD STRIP.AUTO-MCNC: 556 MG/DL (ref 65–100)
GLUCOSE BLD STRIP.AUTO-MCNC: >600 MG/DL (ref 65–100)
GLUCOSE SERPL-MCNC: 764 MG/DL (ref 65–100)
GLUCOSE SERPL-MCNC: 806 MG/DL (ref 65–100)
GLUCOSE SERPL-MCNC: 972 MG/DL (ref 65–100)
GLUCOSE UR STRIP.AUTO-MCNC: >1000 MG/DL
GLUCOSE, GLC: 414 MG/DL
GLUCOSE, GLC: 520 MG/DL
GLUCOSE, GLC: 556 MG/DL
GLUCOSE, GLC: 600 MG/DL
GLUCOSE, GLC: 601 MG/DL
HCT VFR BLD AUTO: 45.5 % (ref 36.6–50.3)
HGB BLD-MCNC: 14.4 G/DL (ref 12.1–17)
HGB UR QL STRIP: ABNORMAL
HIGH TARGET, HITG: 250 MG/DL
HYALINE CASTS URNS QL MICRO: ABNORMAL /LPF (ref 0–5)
IMM GRANULOCYTES # BLD AUTO: 0 K/UL (ref 0–0.04)
IMM GRANULOCYTES NFR BLD AUTO: 0 % (ref 0–0.5)
INSULIN ADMINSTERED, INSADM: 10.8 UNITS/HOUR
INSULIN ADMINSTERED, INSADM: 16.2 UNITS/HOUR
INSULIN ADMINSTERED, INSADM: 17.7 UNITS/HOUR
INSULIN ADMINSTERED, INSADM: 19.8 UNITS/HOUR
INSULIN ADMINSTERED, INSADM: 23 UNITS/HOUR
INSULIN ORDER, INSORD: 10.8 UNITS/HOUR
INSULIN ORDER, INSORD: 16.2 UNITS/HOUR
INSULIN ORDER, INSORD: 17.7 UNITS/HOUR
INSULIN ORDER, INSORD: 19.8 UNITS/HOUR
INSULIN ORDER, INSORD: 23 UNITS/HOUR
KETONES UR QL STRIP.AUTO: >80 MG/DL
LACTATE SERPL-SCNC: 2.9 MMOL/L (ref 0.4–2)
LACTATE SERPL-SCNC: 3.5 MMOL/L (ref 0.4–2)
LEUKOCYTE ESTERASE UR QL STRIP.AUTO: NEGATIVE
LOW TARGET, LOT: 150 MG/DL
LYMPHOCYTES # BLD: 1.7 K/UL (ref 0.8–3.5)
LYMPHOCYTES NFR BLD: 14 % (ref 12–49)
MAGNESIUM SERPL-MCNC: 2.5 MG/DL (ref 1.6–2.4)
MAGNESIUM SERPL-MCNC: 2.7 MG/DL (ref 1.6–2.4)
MCH RBC QN AUTO: 31.9 PG (ref 26–34)
MCHC RBC AUTO-ENTMCNC: 31.6 G/DL (ref 30–36.5)
MCV RBC AUTO: 100.9 FL (ref 80–99)
METAMYELOCYTES NFR BLD MANUAL: 1 %
MINUTES UNTIL NEXT BG, NBG: 60 MIN
MONOCYTES # BLD: 0.6 K/UL (ref 0–1)
MONOCYTES NFR BLD: 5 % (ref 5–13)
MULTIPLIER, MUL: 0.02
MULTIPLIER, MUL: 0.03
MULTIPLIER, MUL: 0.04
MULTIPLIER, MUL: 0.05
MULTIPLIER, MUL: 0.05
NEUTS BAND NFR BLD MANUAL: 1 %
NEUTS SEG # BLD: 9.2 K/UL (ref 1.8–8)
NEUTS SEG NFR BLD: 76 % (ref 32–75)
NITRITE UR QL STRIP.AUTO: NEGATIVE
NRBC # BLD: 0 K/UL (ref 0–0.01)
NRBC BLD-RTO: 0 PER 100 WBC
ORDER INITIALS, ORDINIT: NORMAL
PCO2 BLD: <15 MMHG (ref 35–45)
PH BLD: 7.09 [PH] (ref 7.35–7.45)
PH UR STRIP: 5 [PH] (ref 5–8)
PHOSPHATE SERPL-MCNC: 7 MG/DL (ref 2.6–4.7)
PHOSPHATE SERPL-MCNC: 7.1 MG/DL (ref 2.6–4.7)
PLATELET # BLD AUTO: 388 K/UL (ref 150–400)
PMV BLD AUTO: 11.1 FL (ref 8.9–12.9)
PO2 BLD: 164 MMHG (ref 80–100)
POTASSIUM SERPL-SCNC: 4.3 MMOL/L (ref 3.5–5.1)
POTASSIUM SERPL-SCNC: 4.5 MMOL/L (ref 3.5–5.1)
POTASSIUM SERPL-SCNC: 6.1 MMOL/L (ref 3.5–5.1)
PROCALCITONIN SERPL-MCNC: 2.01 NG/ML
PROT SERPL-MCNC: 6.4 G/DL (ref 6.4–8.2)
PROT UR STRIP-MCNC: 100 MG/DL
RBC # BLD AUTO: 4.51 M/UL (ref 4.1–5.7)
RBC #/AREA URNS HPF: ABNORMAL /HPF (ref 0–5)
RBC MORPH BLD: ABNORMAL
SERVICE CMNT-IMP: ABNORMAL
SODIUM SERPL-SCNC: 120 MMOL/L (ref 136–145)
SODIUM SERPL-SCNC: 124 MMOL/L (ref 136–145)
SODIUM SERPL-SCNC: 125 MMOL/L (ref 136–145)
SP GR UR REFRACTOMETRY: 1.03 (ref 1–1.03)
SPECIMEN TYPE: ABNORMAL
UA: UC IF INDICATED,UAUC: ABNORMAL
UROBILINOGEN UR QL STRIP.AUTO: 0.2 EU/DL (ref 0.2–1)
WBC # BLD AUTO: 11.9 K/UL (ref 4.1–11.1)
WBC URNS QL MICRO: ABNORMAL /HPF (ref 0–4)

## 2020-03-07 PROCEDURE — 65620000000 HC RM CCU GENERAL

## 2020-03-07 PROCEDURE — 83605 ASSAY OF LACTIC ACID: CPT

## 2020-03-07 PROCEDURE — 93005 ELECTROCARDIOGRAM TRACING: CPT

## 2020-03-07 PROCEDURE — 36415 COLL VENOUS BLD VENIPUNCTURE: CPT

## 2020-03-07 PROCEDURE — 74011000250 HC RX REV CODE- 250: Performed by: EMERGENCY MEDICINE

## 2020-03-07 PROCEDURE — 83735 ASSAY OF MAGNESIUM: CPT

## 2020-03-07 PROCEDURE — 96368 THER/DIAG CONCURRENT INF: CPT

## 2020-03-07 PROCEDURE — 74011000250 HC RX REV CODE- 250: Performed by: INTERNAL MEDICINE

## 2020-03-07 PROCEDURE — 74011250636 HC RX REV CODE- 250/636: Performed by: INTERNAL MEDICINE

## 2020-03-07 PROCEDURE — C1751 CATH, INF, PER/CENT/MIDLINE: HCPCS

## 2020-03-07 PROCEDURE — 80053 COMPREHEN METABOLIC PANEL: CPT

## 2020-03-07 PROCEDURE — 84100 ASSAY OF PHOSPHORUS: CPT

## 2020-03-07 PROCEDURE — 96375 TX/PRO/DX INJ NEW DRUG ADDON: CPT

## 2020-03-07 PROCEDURE — 71045 X-RAY EXAM CHEST 1 VIEW: CPT

## 2020-03-07 PROCEDURE — 74011636637 HC RX REV CODE- 636/637: Performed by: EMERGENCY MEDICINE

## 2020-03-07 PROCEDURE — 82962 GLUCOSE BLOOD TEST: CPT

## 2020-03-07 PROCEDURE — 05HY33Z INSERTION OF INFUSION DEVICE INTO UPPER VEIN, PERCUTANEOUS APPROACH: ICD-10-PCS | Performed by: ANESTHESIOLOGY

## 2020-03-07 PROCEDURE — 74011000258 HC RX REV CODE- 258: Performed by: EMERGENCY MEDICINE

## 2020-03-07 PROCEDURE — 81001 URINALYSIS AUTO W/SCOPE: CPT

## 2020-03-07 PROCEDURE — 96365 THER/PROPH/DIAG IV INF INIT: CPT

## 2020-03-07 PROCEDURE — 84145 PROCALCITONIN (PCT): CPT

## 2020-03-07 PROCEDURE — 99285 EMERGENCY DEPT VISIT HI MDM: CPT

## 2020-03-07 PROCEDURE — 80048 BASIC METABOLIC PNL TOTAL CA: CPT

## 2020-03-07 PROCEDURE — 74011250636 HC RX REV CODE- 250/636: Performed by: EMERGENCY MEDICINE

## 2020-03-07 PROCEDURE — 85025 COMPLETE CBC W/AUTO DIFF WBC: CPT

## 2020-03-07 PROCEDURE — 82803 BLOOD GASES ANY COMBINATION: CPT

## 2020-03-07 PROCEDURE — 87040 BLOOD CULTURE FOR BACTERIA: CPT

## 2020-03-07 PROCEDURE — 36600 WITHDRAWAL OF ARTERIAL BLOOD: CPT

## 2020-03-07 RX ORDER — HEPARIN SODIUM 5000 [USP'U]/ML
5000 INJECTION, SOLUTION INTRAVENOUS; SUBCUTANEOUS EVERY 8 HOURS
Status: DISCONTINUED | OUTPATIENT
Start: 2020-03-07 | End: 2020-03-11 | Stop reason: HOSPADM

## 2020-03-07 RX ORDER — SODIUM CHLORIDE 9 MG/ML
200 INJECTION, SOLUTION INTRAVENOUS CONTINUOUS
Status: DISCONTINUED | OUTPATIENT
Start: 2020-03-07 | End: 2020-03-07

## 2020-03-07 RX ORDER — ONDANSETRON 2 MG/ML
4 INJECTION INTRAMUSCULAR; INTRAVENOUS
Status: DISCONTINUED | OUTPATIENT
Start: 2020-03-07 | End: 2020-03-09

## 2020-03-07 RX ORDER — ONDANSETRON 2 MG/ML
4 INJECTION INTRAMUSCULAR; INTRAVENOUS
Status: DISCONTINUED | OUTPATIENT
Start: 2020-03-07 | End: 2020-03-11 | Stop reason: HOSPADM

## 2020-03-07 RX ORDER — DEXTROSE 50 % IN WATER (D50W) INTRAVENOUS SYRINGE
25-50 AS NEEDED
Status: DISCONTINUED | OUTPATIENT
Start: 2020-03-07 | End: 2020-03-09 | Stop reason: SDUPTHER

## 2020-03-07 RX ORDER — SODIUM BICARBONATE 84 MG/ML
50 INJECTION, SOLUTION INTRAVENOUS
Status: COMPLETED | OUTPATIENT
Start: 2020-03-07 | End: 2020-03-07

## 2020-03-07 RX ORDER — ACETAMINOPHEN 650 MG/1
650 SUPPOSITORY RECTAL
Status: DISCONTINUED | OUTPATIENT
Start: 2020-03-07 | End: 2020-03-11 | Stop reason: HOSPADM

## 2020-03-07 RX ORDER — CALCIUM GLUCONATE 94 MG/ML
3 INJECTION, SOLUTION INTRAVENOUS
Status: DISCONTINUED | OUTPATIENT
Start: 2020-03-07 | End: 2020-03-07

## 2020-03-07 RX ORDER — MAGNESIUM SULFATE 100 %
4 CRYSTALS MISCELLANEOUS AS NEEDED
Status: DISCONTINUED | OUTPATIENT
Start: 2020-03-07 | End: 2020-03-09 | Stop reason: SDUPTHER

## 2020-03-07 RX ORDER — POTASSIUM CHLORIDE AND SODIUM CHLORIDE 900; 300 MG/100ML; MG/100ML
INJECTION, SOLUTION INTRAVENOUS CONTINUOUS
Status: DISCONTINUED | OUTPATIENT
Start: 2020-03-07 | End: 2020-03-08

## 2020-03-07 RX ADMIN — POTASSIUM CHLORIDE AND SODIUM CHLORIDE: 900; 300 INJECTION, SOLUTION INTRAVENOUS at 20:36

## 2020-03-07 RX ADMIN — SODIUM BICARBONATE 50 MEQ: 84 INJECTION, SOLUTION INTRAVENOUS at 16:06

## 2020-03-07 RX ADMIN — HEPARIN SODIUM 5000 UNITS: 5000 INJECTION INTRAVENOUS; SUBCUTANEOUS at 22:11

## 2020-03-07 RX ADMIN — SODIUM CHLORIDE 1000 ML: 900 INJECTION, SOLUTION INTRAVENOUS at 17:19

## 2020-03-07 RX ADMIN — SODIUM CHLORIDE 200 ML/HR: 900 INJECTION, SOLUTION INTRAVENOUS at 17:50

## 2020-03-07 RX ADMIN — SODIUM CHLORIDE 10.8 UNITS/HR: 9 INJECTION, SOLUTION INTRAVENOUS at 17:42

## 2020-03-07 RX ADMIN — HUMAN INSULIN 6 UNITS: 100 INJECTION, SOLUTION SUBCUTANEOUS at 16:07

## 2020-03-07 RX ADMIN — SODIUM CHLORIDE 17.7 UNITS/HR: 9 INJECTION, SOLUTION INTRAVENOUS at 23:34

## 2020-03-07 RX ADMIN — SODIUM CHLORIDE 1000 ML: 900 INJECTION, SOLUTION INTRAVENOUS at 13:35

## 2020-03-07 RX ADMIN — CALCIUM GLUCONATE 3 G: 94 INJECTION, SOLUTION INTRAVENOUS at 16:11

## 2020-03-07 RX ADMIN — PIPERACILLIN AND TAZOBACTAM 3.38 G: 3; .375 INJECTION, POWDER, LYOPHILIZED, FOR SOLUTION INTRAVENOUS at 16:09

## 2020-03-07 RX ADMIN — SODIUM CHLORIDE 10 MG: 9 INJECTION INTRAMUSCULAR; INTRAVENOUS; SUBCUTANEOUS at 23:01

## 2020-03-07 RX ADMIN — ONDANSETRON 4 MG: 2 INJECTION INTRAMUSCULAR; INTRAVENOUS at 19:23

## 2020-03-07 RX ADMIN — VANCOMYCIN HYDROCHLORIDE 1000 MG: 1 INJECTION, POWDER, LYOPHILIZED, FOR SOLUTION INTRAVENOUS at 17:50

## 2020-03-07 NOTE — ED NOTES
Turned and cleaned pt changed sheets, pt had large soft BM. Pt repositioned in the bed.  Provided pt with urinal.

## 2020-03-07 NOTE — ED NOTES
Pt able to take PO fluids drank 2 large cups of water approximately 2000ml. Pt does not maintain warming blanket and removes blanket. Pt repeatedly redirected to keep blanket on.  Assisted pt with urinal.

## 2020-03-07 NOTE — ED PROVIDER NOTES
EMERGENCY DEPARTMENT HISTORY AND PHYSICAL EXAM      Date: 3/7/2020  Patient Name: Kim Woodruff  Patient Age and Sex: 42 y.o. male    History of Presenting Illness     Chief Complaint   Patient presents with    Fatigue     arrived via  ems for ams and hyperglycemia and vomiting    High Blood Sugar       History Provided By: Patient    HPI: Kim Woodruff, is a 42 y.o. male whose medical history is noted below presents to the ED with Hyperglycemia, fatigue, low p.o. intake. Patient has type 1 diabetes and multiple hospital admissions for DKA and hyperglycemia, last of which was in January of this year. Does not follow his blood sugar regularly. Symptoms have been going on for 2-3 days and he has gradually been getting weaker. No fever or chills. No abd pain, has nausea but no vomiting. Pt denies any other alleviating or exacerbating factors. There are no other complaints, changes or physical findings at this time.      Past Medical History:   Diagnosis Date    Bipolar 1 disorder, depressed ([address]     Bipolar disorder ([address]     Depression     Diabetes ([address]     DKA, type 1 ([address] 1/27/2013    diagnosed age 23    H/O noncompliance with medical treatment, presenting hazards to health     MRSA (methicillin resistant staph aureus) culture positive     MRSA (methicillin resistant Staphylococcus aureus)     Face    Noncompliance with medication regimen     Second hand smoke exposure     Seizure ([address]     Seizures ([address] 2006 or 2007    one episode during residential     Past Surgical History:   Procedure Laterality Date    HX HEENT      top left wisdom tooth    HX ORTHOPAEDIC Left     wrist; MCV    UPPER GI ENDOSCOPY,BIOPSY  11/20/2018            PCP: None    Past History   Past Medical History:  Past Medical History:   Diagnosis Date    Bipolar 1 disorder, depressed ([address]     Bipolar disorder ([address]     Depression     Diabetes ([address]     DKA, type 1 ([address] 1/27/2013    diagnosed age 20    H/O noncompliance with medical treatment, presenting hazards to health     MRSA (methicillin resistant staph aureus) culture positive     MRSA (methicillin resistant Staphylococcus aureus)     Face    Noncompliance with medication regimen     Second hand smoke exposure     [facility]     Seizures ([facility] 2006 or 2007    one episode during group home       Past Surgical History:  Past Surgical History:   Procedure Laterality Date    HX HEENT      top left wisdom tooth    HX ORTHOPAEDIC Left     wrist; MCV    UPPER GI ENDOSCOPY,BIOPSY  11/20/2018            Family History:  Family History   Problem Relation Age of Onset    Diabetes Mother     Diabetes Other         neice, type 1        Social History:  Social History     Tobacco Use    Smoking status: Current Some Day Smoker     Packs/day: 0.10     Years: 16.00     Pack years: 1.60     Types: Cigarettes    Smokeless tobacco: Never Used   Substance Use Topics    Alcohol use: No    Drug use: No       Allergies:  No Known Allergies    Current Medications:  No current facility-administered medications on file prior to encounter. Current Outpatient Medications on File Prior to Encounter   Medication Sig Dispense Refill    pantoprazole (PROTONIX) 40 mg tablet Take 1 Tab by mouth two (2) times a day. 60 Tab 0    insulin NPH/insulin regular (NOVOLIN 70/30 U-100 INSULIN) 100 unit/mL (70-30) injection 15 Units by SubCUTAneous route Before breakfast and dinner. 10 mL 1    OTHER Insulin needles,Alcohol swabs, syringes - for 1 month 30 Each 1    tamsulosin (FLOMAX) 0.4 mg capsule Take 1 Cap by mouth daily. 30 Cap 1    insulin regular (NOVOLIN R) 100 unit/mL injection 2-10 Units by SubCUTAneous route Before breakfast, lunch, and dinner.  Blood Glucose (mg/dL)       Insulin Dose (units)              150-200                               2               201-250                               4               251-300                               6               301-350 8               >351                                   10         Review of Systems     Conducted full ROS with patient. All systems negative except as noted below or in HPI:  Constitutional: NO fever, chills, wt loss, + fatigue, malaise  Eyes: NO changes in vision, eye pain, discoloration or discharge  ENT/Mouth: NO congestion, nose bleeds, ear pain or drainage, difficulty with swallowing  Cardiovascular: NO cp, palpitations, orthopnea, PND  Pulm: NO sob, cough, pleurisy, chest wall pain  GI: NO abd pain, vomiting , diarrhea or constipation  : NO dysuria, hematuria, flank pain   Neuro: NO focal weakness, gait abnormalities, difficulty with speech, HA, seizures  Skin: NO rashes, wounds, skin discoloration  Endocrine: NO polyuria or polydipsia, cold or heat intolerance  Hematologic/Lymphatic: No easy bruising, no recurrent bleeding  MSK: NO joint swelling, arthralgias, arthritis, stiffness    Physical Exam     Vital signs and nursing notes reviewed  CONSTITUTIONAL: Awake, ill appearing. HEAD:  Normocephalic, atraumatic  EYES: EOM's intact. No conjunctival injection or scleral icterus, PERRLA. Dry mucus membranes  ENTM: Nose: no rhinorrhea; Throat: no erythema or exudate, mucous membranes moist  Neck:  Supple. trachea is midline. No JVD  RESP: CTAB with no wheeze, rales or rhonchi. CV: Regular rate and regular rhythm. No murmurs heard. Well perfused. 2+ radial pulse  GI: Non-distended, normal bowel sounds, abdomen soft and non-tender. Extremities:  No joint swelling or evidence of trauma. No pedal edema. Full ROM. NEURO: Alert and oriented x3,  Moving all 4 extremities, normal speech, no focal deficits. SKIN: No rashes; Warm and dry.   PSYCH: Normal mood, normal affect    Diagnostic Study Results     Labs -  Recent Results (from the past 24 hour(s))   GLUCOSE, POC    Collection Time: 03/07/20 12:53 PM   Result Value Ref Range    Glucose (POC) >600 (HH) 65 - 100 mg/dL    Performed by [facility]    GLUCOSE, POC    Collection Time: 03/07/20 12:54 PM   Result Value Ref Range    Glucose (POC) >600 (HH) 65 - 100 mg/dL    Performed by [facility]        Radiologic Studies -   XR CHEST PORT    (Results Pending)         Medical Decision Making   I am the first provider for this patient. Records Reviewed: I reviewed our electronic medical record system for any past medical records that were available that may contribute to the patient's current condition, including their PMH, surgical history, social and family history. Reviewed the nursing notes and vital signs from today's visit. Nursing notes will be reviewed as they become available in realtime while the pt has been in the ED. Valentine Gold MD Msc    Vital Signs-Reviewed the patient's vital signs. Patient Vitals for the past 24 hrs:   Pulse Resp BP SpO2   03/07/20 1252 88 24 118/75 98 %         ED ECG interpretation:  ECG shows normal sinus rhythm, with HR 89, peaked T waves, QRS not prolonged and no ST changes concerning for ischemia. This ECG was interpreted by Valentine Gold M.D. Provider Notes (Medical Decision Making):   High glucose level on arrival, patient appears ill, dry, mental status is normal however. Rectal temperature checked twice confirming hypothermia, rest of vital signs are normal.  Abdomen soft, not tender. I suspect DKA again given his history and will treat as sepsis given hypothermia and high risk. Exam does not show any focal points of infection. Reeval:  Hemodynamically stable, remains hypothermic and is on a fritz hugger. Reviewed all labs. dka confirmed and treated accordingly. hyperK also present with ecg changes; calcium given immediately along with insulin and bicarb. Patient is profoundly acidotic. Mental state remains good overall. Will need admission with close monitoring.  He is on an insulin ggt, bicarb ggt, has received broad spectrum abx, several L of fluids, calcium gluconate. [id number] [facility] ED SEPSIS NOTE:     3:25 PM The patient now meets criteria for: Severe Sepsis    1. Fluid resuscitation with: Patient does not require a 30cc/kg bolus because they do not meet criteria for septic shock (SBP<90 or decreased by >40 mmHG from baseline, MAP<65, Lactate 4 or greater). Due to concern for rapidly advancing infection and deterioration of patient's condition, antibiotics are started STAT and cultures ordered. I've performed a sepsis reassessment of the patient's clinical volume status and tissue perfusion at time 5:15PM      ED Course:   Initial assessment performed. The patients presenting problems have been discussed, and they are in agreement with the care plan formulated and outlined with them. I have encouraged them to ask questions as they arise throughout their visit.       Medications Administered During ED Course:  Medications   vancomycin (VANCOCIN) 1,000 mg in 0.9% sodium chloride (MBP/ADV) 250 mL (has no administration in time range)   piperacillin-tazobactam (ZOSYN) 3.375 g in 0.9% sodium chloride (MBP/ADV) 100 mL (has no administration in time range)   sodium chloride 0.9 % bolus infusion 1,000 mL (has no administration in time range)   sodium bicarbonate 8.4 % (1 mEq/mL) injection 50 mEq (has no administration in time range)   insulin regular (NOVOLIN R, HUMULIN R) 100 Units in 0.9% sodium chloride 100 mL infusion (has no administration in time range)   glucose chewable tablet 16 g (has no administration in time range)   dextrose (D50W) injection syrg 12.5-25 g (has no administration in time range)   glucagon (GLUCAGEN) injection 1 mg (has no administration in time range)   insulin regular (NOVOLIN R, HUMULIN R) injection 6 Units (has no administration in time range)   sodium chloride 0.9 % bolus infusion 1,000 mL (1,000 mL IntraVENous New Bag 3/7/20 1335)   sodium chloride 0.9 % bolus infusion 1,000 mL (1,000 mL IntraVENous New Bag 3/7/20 1335) DISPOSITION: ADMIT  Patient is being admitted to the hospital.  Their test results and reasons for admission have been discussed. The patient and/or available family express agreement with and understanding of the need to be admitted and their admission diagnosis. Thank you for resuming the care of this patient. Please don't hesitate to contact me in the emergency department if you  have any additional questions. Min Sanchez MD, MSc        Diagnosis     Clinical Impression:   1. Hypothermia, initial encounter    2. Acidosis, metabolic    3. DKA, type 1, not at goal [facility])    4. FELECIA (acute kidney injury) ([facility]      CRITICAL CARE NOTE :    IMPENDING DETERIORATION -Metabolic  ASSOCIATED RISK FACTORS - Metabolic changes  MANAGEMENT- Bedside Assessment  INTERPRETATION -  Blood Gases, ECG and Blood Pressure  INTERVENTIONS - hemodynamic mngmt and Metobolic interventions  CASE REVIEW - Hospitalist and Nursing  TREATMENT RESPONSE -Improved  PERFORMED BY - Self    NOTES   :    I have spent 60 minutes of critical care time involved in lab review, consultations with specialist, family decision- making, bedside attention and documentation. During this entire length of time I was immediately available to the patient . Critical Care: The reason for providing this level of medical care for this critically ill patient was due to a critical illness that impaired one or more vital organ systems, such that there was a high probability of imminent or life threatening deterioration in the patient's condition. This care involved high complexity decision making to assess, manipulate, and support vital system functions, to treat this degree of vital organ system failure, and to prevent further life threatening deterioration of the patients condition. Min Sanchez MD      Attestation:  I personally performed the services described in this documentation on this date 3/7/2020 for patient Sherwin Saucedo.   Min Sanchez MD    Please note that this dictation was completed with [employer], the computer voice recognition software. Quite often unanticipated grammatical, syntax, homophones, and other interpretive errors are inadvertently transcribed by the computer software. Please disregard these errors. Please excuse any errors that have escaped final proofreading.

## 2020-03-07 NOTE — H&P
Hospitalist Admission Note    NAME: Cristian Payment   :  1980   MRN:  [id number]     Date/Time:  3/7/2020 4:02 PM    Patient PCP: None  _____________________________________________________________________  Given the patient's current clinical presentation, I have a high level of concern for decompensation if discharged from the emergency department. Complex decision making was performed, which includes reviewing the patient's available past medical records, laboratory results, and x-ray films. My assessment of this patient's clinical condition and my plan of care is as follows. Assessment / Plan:    Type 1 DM, uncontrolled in DKA  Profound metabolic acidosis (pH 3.49)  FELECIA  Volume depletion   Hyponatremia  Hyperkalemia  Hypothermia (90.9F)  -admit to ICU due to profound acidosis, hypothermia, at risk for arrhythmias and progressive multi organ failure.   -given acute treatment for hyperkalemia in ER  -s/p 3L NS bolus, continue aggressive volume expansion   -IV insulin infusion  -fritz lopez. Sepsis possible but likely due to impaired heat generation in the setting of DKA and poor glucose utilization. Given empiric zosyn and vanco by ER, will hold for now and follow clinically and follow up on cultures  -monitor BMP, Mg and Phos and UOP closely.         Hx Depression / Bipolar   Hx Seizures     Code Status: Full  Surrogate Decision Maker:     DVT Prophylaxis:   SCD  GI Prophylaxis: not indicated            Subjective:   CHIEF COMPLAINT:   High blood sugar    HISTORY OF PRESENT ILLNESS:     Moose Ram is a 42 y.o.  male with a history of Type 1 DM who presents via EMS to the ER because of weakness and poor intake. He reports that his blood sugars have been okay at home and just didn't feel well. ER work up is remarkable for profound acidosis with a pH of 7.08, hyperglycemia blood sugar 972, FELECIA, lactic acidosis, hyperkalemia and hypothermia temperature of 90 F. We were asked to admit for work up and evaluation of the above problems. Past Medical History:   Diagnosis Date    [facility]     Bipolar disorder ([facility]     Depression     Diabetes ([facility]     DKA, type 1 ([facility] 1/27/2013    diagnosed age 23    H/O noncompliance with medical treatment, presenting hazards to health     MRSA (methicillin resistant staph aureus) culture positive     MRSA (methicillin resistant Staphylococcus aureus)     Face    Noncompliance with medication regimen     Second hand smoke exposure     [facility]     Seizures ([facility] 2006 or 2007    one episode during care home        Past Surgical History:   Procedure Laterality Date    HX HEENT      top left wisdom tooth    HX ORTHOPAEDIC Left     wrist; MCV    UPPER GI ENDOSCOPY,BIOPSY  11/20/2018            Social History     Tobacco Use    Smoking status: Current Some Day Smoker     Packs/day: 0.10     Years: 16.00     Pack years: 1.60     Types: Cigarettes    Smokeless tobacco: Never Used   Substance Use Topics    Alcohol use: No        Family History   Problem Relation Age of Onset    Diabetes Mother     Diabetes Other         neice, type 1      No Known Allergies     Prior to Admission medications    Medication Sig Start Date End Date Taking? Authorizing Provider   pantoprazole (PROTONIX) 40 mg tablet Take 1 Tab by mouth two (2) times a day. 1/27/20   Jessica Medrano MD   insulin NPH/insulin regular (NOVOLIN 70/30 U-100 INSULIN) 100 unit/mL (70-30) injection 15 Units by SubCUTAneous route Before breakfast and dinner. 1/5/20   Florentino Maynard MD   OTHER Insulin needles,Alcohol swabs, syringes - for 1 month 1/5/20   Florentino Maynard MD   [facility]) 0.4 mg capsule Take 1 Cap by mouth daily. 1/6/20   Florentino Maynard MD   insulin regular (NOVOLIN R) 100 unit/mL injection 2-10 Units by SubCUTAneous route Before breakfast, lunch, and dinner.  Blood Glucose (mg/dL)       Insulin Dose (units) 150-200                               2               201-250                               4               251-300                               6               301-350                               8               >351                                   10    Provider, Historical       REVIEW OF SYSTEMS:     I am not able to complete the review of systems because: The patient is intubated and sedated   x The patient has altered mental status due to his acute medical problems    The patient has baseline aphasia from prior stroke(s)    The patient has baseline dementia and is not reliable historian    The patient is in acute medical distress and unable to provide information           Objective:   VITALS:    Visit Vitals  /58   Pulse 87   Temp (!) 90.9 °F (32.7 °C)   Resp 30   Ht 5' 9\" (1.753 m)   Wt 64.9 kg (143 lb)   SpO2 100%   BMI 21.12 kg/m²       PHYSICAL EXAM:    General:    Critically ill appearing. Altered MS. HEENT: Atraumatic, anicteric sclerae, pink conjunctivae     No oral ulcers, mucosa dry,   Neck:  Supple, symmetrical,  thyroid: non tender  Lungs:   Clear to auscultation bilaterally. No Wheezing or Rhonchi. No rales. Chest wall:  No tenderness  No Accessory muscle use. Heart:   Regular  rhythm,    No edema  Abdomen:   Soft, non-tender. Not distended. Bowel sounds normal  Extremities: No cyanosis. No clubbing,  Skin turgor abnormal, Capillary refill abnormal, Radial dial pulse 2+  Skin:     Not pale. Not Jaundiced  No rashes   Psych:  Not anxious or agitated. Neurologic: EOMs intact. No facial asymmetry. Symmetrical strength, Sensation grossly intact. Arouses easily to voice and touch. Follows simple commands.      _______________________________________________________________________  Care Plan discussed with:    Comments   Patient x    Family      RN x    Care Manager                    Consultant: _______________________________________________________________________  Expected  Disposition:   Home with Family x   HH/PT/OT/RN    SNF/LTC    CATHIE    ________________________________________________________________________  TOTAL TIME:    Minutes    Critical Care Provided  50   Minutes non procedure based  I have provided        minutes of critical care time. During this entire length of time I was immediately available to the patient. The reason for providing this level of medical care was due to a critical illness that impaired one or more vital organ systems, such that there was a high probability of imminent or life threatening deterioration in the patient's condition. This care involved high complexity decision making which includes reviewing the patient's past medical records, current laboratory results, and actual Xray films in order to assess, support vital system function, and to treat this degree of vital organ system failure, and to prevent further life threatening deterioration of the patients condition. I have also discussed this case with the involved ED physician         Comments     Reviewed previous records   >50% of visit spent in counseling and coordination of care  Discussion with patient and/or family and questions answered       Given the patient's current clinical presentation, I have a high level of concern for decompensation if discharged from the ED. Complex decision making was performed which includes reviewing the patient's available past medical records, laboratory results, and Xray films. I have also directly communicated my plan and discussed this case with the involved ED physician.     ____________________________________________________________________  Mario Beyer MD    Procedures: see electronic medical records for all procedures/Xrays and details which were not copied into this note but were reviewed prior to creation of Plan.     LAB DATA REVIEWED:    Recent Results (from the past 24 hour(s))   GLUCOSE, POC    Collection Time: 03/07/20 12:53 PM   Result Value Ref Range    Glucose (POC) >600 (HH) 65 - 100 mg/dL    Performed by [address] [place] [address], POC    Collection Time: 03/07/20 12:54 PM   Result Value Ref Range    Glucose (POC) >600 (HH) 65 - 100 mg/dL    Performed by [facility]    CBC WITH AUTOMATED DIFF    Collection Time: 03/07/20  1:29 PM   Result Value Ref Range    WBC 11.9 (H) 4.1 - 11.1 K/uL    RBC 4.51 4.10 - 5.70 M/uL    HGB 14.4 12.1 - 17.0 g/dL    HCT 45.5 36.6 - 50.3 %    .9 (H) 80.0 - 99.0 FL    MCH 31.9 26.0 - 34.0 PG    MCHC 31.6 30.0 - 36.5 g/dL    RDW 12.9 11.5 - 14.5 %    PLATELET 159 625 - 499 K/uL    MPV 11.1 8.9 - 12.9 FL    NRBC 0.0 0  WBC    ABSOLUTE NRBC 0.00 0.00 - 0.01 K/uL    NEUTROPHILS 76 (H) 32 - 75 %    BAND NEUTROPHILS 1 %    LYMPHOCYTES 14 12 - 49 %    MONOCYTES 5 5 - 13 %    EOSINOPHILS 2 0 - 7 %    BASOPHILS 1 0 - 1 %    METAMYELOCYTES 1 %    IMMATURE GRANULOCYTES 0 0.0 - 0.5 %    ABS. NEUTROPHILS 9.2 (H) 1.8 - 8.0 K/UL    ABS. LYMPHOCYTES 1.7 0.8 - 3.5 K/UL    ABS. MONOCYTES 0.6 0.0 - 1.0 K/UL    ABS. EOSINOPHILS 0.2 0.0 - 0.4 K/UL    ABS. BASOPHILS 0.1 0.0 - 0.1 K/UL    ABS. IMM.  GRANS. 0.0 0.00 - 0.04 K/UL    DF MANUAL      RBC COMMENTS DEBBIE CELLS  PRESENT       POC EG7    Collection Time: 03/07/20  2:02 PM   Result Value Ref Range    Calcium, ionized (POC) 1.16 1.12 - 1.32 mmol/L    pH (POC) 7.088 (LL) 7.35 - 7.45      pCO2 (POC) <15.0 (L) 35.0 - 45.0 MMHG    pO2 (POC) 164 (H) 80 - 100 MMHG    Site RIGHT RADIAL      Device: ROOM AIR      Allens test (POC) YES      Specimen type (POC) ARTERIAL     EKG, 12 LEAD, INITIAL    Collection Time: 03/07/20  2:11 PM   Result Value Ref Range    Ventricular Rate 89 BPM    Atrial Rate 89 BPM    P-R Interval 168 ms    QRS Duration 128 ms    Q-T Interval 414 ms    QTC Calculation (Bezet) 503 ms    Calculated P Axis 69 degrees    Calculated R Axis 83 degrees    Calculated T Axis 32 degrees    Diagnosis       Normal sinus rhythm  Nonspecific intraventricular block  When compared with ECG of 06-JUL-2019 13:56,  Nonspecific intraventricular block has replaced Incomplete right bundle   branch block     METABOLIC PANEL, COMPREHENSIVE    Collection Time: 03/07/20  2:23 PM   Result Value Ref Range    Sodium 120 (L) 136 - 145 mmol/L    Potassium 6.1 (H) 3.5 - 5.1 mmol/L    Chloride 81 (L) 97 - 108 mmol/L    CO2 <5 (LL) 21 - 32 mmol/L    Anion gap Cannot be calculated 5 - 15 mmol/L    Glucose 972 (HH) 65 - 100 mg/dL    BUN 42 (H) 6 - 20 MG/DL    Creatinine 1.64 (H) 0.70 - 1.30 MG/DL    BUN/Creatinine ratio 26 (H) 12 - 20      GFR est AA 58 (L) >60 ml/min/1.73m2    GFR est non-AA 48 (L) >60 ml/min/1.73m2    Calcium 8.3 (L) 8.5 - 10.1 MG/DL    Bilirubin, total 0.6 0.2 - 1.0 MG/DL    ALT (SGPT) 31 12 - 78 U/L    AST (SGOT) 17 15 - 37 U/L    Alk.  phosphatase 235 (H) 45 - 117 U/L    Protein, total 6.4 6.4 - 8.2 g/dL    Albumin 2.2 (L) 3.5 - 5.0 g/dL    Globulin 4.2 (H) 2.0 - 4.0 g/dL    A-G Ratio 0.5 (L) 1.1 - 2.2     PROCALCITONIN    Collection Time: 03/07/20  2:23 PM   Result Value Ref Range    Procalcitonin 2.01 ng/mL   LACTIC ACID    Collection Time: 03/07/20  2:29 PM   Result Value Ref Range    Lactic acid 3.5 (HH) 0.4 - 2.0 MMOL/L

## 2020-03-07 NOTE — ED NOTES
Pt continues to move around in bed. ICU requested additional IV access. Pt has had several US guided IV's. MD aware and requested 3 points of access with US.

## 2020-03-08 LAB
ADMINISTERED INITIALS, ADMINIT: NORMAL
ANION GAP SERPL CALC-SCNC: 20 MMOL/L (ref 5–15)
ANION GAP SERPL CALC-SCNC: 4 MMOL/L (ref 5–15)
ANION GAP SERPL CALC-SCNC: 5 MMOL/L (ref 5–15)
ANION GAP SERPL CALC-SCNC: 6 MMOL/L (ref 5–15)
ANION GAP SERPL CALC-SCNC: 9 MMOL/L (ref 5–15)
ATRIAL RATE: 89 BPM
BASOPHILS # BLD: 0 K/UL (ref 0–0.1)
BASOPHILS NFR BLD: 0 % (ref 0–1)
BUN SERPL-MCNC: 26 MG/DL (ref 6–20)
BUN SERPL-MCNC: 32 MG/DL (ref 6–20)
BUN SERPL-MCNC: 39 MG/DL (ref 6–20)
BUN SERPL-MCNC: 42 MG/DL (ref 6–20)
BUN SERPL-MCNC: 44 MG/DL (ref 6–20)
BUN/CREAT SERPL: 25 (ref 12–20)
BUN/CREAT SERPL: 26 (ref 12–20)
BUN/CREAT SERPL: 27 (ref 12–20)
BUN/CREAT SERPL: 28 (ref 12–20)
BUN/CREAT SERPL: 28 (ref 12–20)
CALCIUM SERPL-MCNC: 7.4 MG/DL (ref 8.5–10.1)
CALCIUM SERPL-MCNC: 7.5 MG/DL (ref 8.5–10.1)
CALCIUM SERPL-MCNC: 7.6 MG/DL (ref 8.5–10.1)
CALCIUM SERPL-MCNC: 8.1 MG/DL (ref 8.5–10.1)
CALCIUM SERPL-MCNC: 8.2 MG/DL (ref 8.5–10.1)
CALCULATED P AXIS, ECG09: 69 DEGREES
CALCULATED R AXIS, ECG10: 83 DEGREES
CALCULATED T AXIS, ECG11: 32 DEGREES
CHLORIDE SERPL-SCNC: 104 MMOL/L (ref 97–108)
CHLORIDE SERPL-SCNC: 107 MMOL/L (ref 97–108)
CHLORIDE SERPL-SCNC: 107 MMOL/L (ref 97–108)
CHLORIDE SERPL-SCNC: 108 MMOL/L (ref 97–108)
CHLORIDE SERPL-SCNC: 97 MMOL/L (ref 97–108)
CO2 SERPL-SCNC: 14 MMOL/L (ref 21–32)
CO2 SERPL-SCNC: 23 MMOL/L (ref 21–32)
CO2 SERPL-SCNC: 24 MMOL/L (ref 21–32)
CO2 SERPL-SCNC: 26 MMOL/L (ref 21–32)
CO2 SERPL-SCNC: 26 MMOL/L (ref 21–32)
CREAT SERPL-MCNC: 1.01 MG/DL (ref 0.7–1.3)
CREAT SERPL-MCNC: 1.13 MG/DL (ref 0.7–1.3)
CREAT SERPL-MCNC: 1.4 MG/DL (ref 0.7–1.3)
CREAT SERPL-MCNC: 1.56 MG/DL (ref 0.7–1.3)
CREAT SERPL-MCNC: 1.75 MG/DL (ref 0.7–1.3)
D50 ADMINISTERED, D50ADM: 0 ML
D50 ORDER, D50ORD: 0 ML
DIAGNOSIS, 93000: NORMAL
DIFFERENTIAL METHOD BLD: ABNORMAL
EOSINOPHIL # BLD: 0.1 K/UL (ref 0–0.4)
EOSINOPHIL NFR BLD: 0 % (ref 0–7)
ERYTHROCYTE [DISTWIDTH] IN BLOOD BY AUTOMATED COUNT: 12.7 % (ref 11.5–14.5)
GLSCOM COMMENTS: NORMAL
GLUCOSE BLD STRIP.AUTO-MCNC: 117 MG/DL (ref 65–100)
GLUCOSE BLD STRIP.AUTO-MCNC: 128 MG/DL (ref 65–100)
GLUCOSE BLD STRIP.AUTO-MCNC: 133 MG/DL (ref 65–100)
GLUCOSE BLD STRIP.AUTO-MCNC: 141 MG/DL (ref 65–100)
GLUCOSE BLD STRIP.AUTO-MCNC: 143 MG/DL (ref 65–100)
GLUCOSE BLD STRIP.AUTO-MCNC: 153 MG/DL (ref 65–100)
GLUCOSE BLD STRIP.AUTO-MCNC: 157 MG/DL (ref 65–100)
GLUCOSE BLD STRIP.AUTO-MCNC: 158 MG/DL (ref 65–100)
GLUCOSE BLD STRIP.AUTO-MCNC: 164 MG/DL (ref 65–100)
GLUCOSE BLD STRIP.AUTO-MCNC: 169 MG/DL (ref 65–100)
GLUCOSE BLD STRIP.AUTO-MCNC: 175 MG/DL (ref 65–100)
GLUCOSE BLD STRIP.AUTO-MCNC: 185 MG/DL (ref 65–100)
GLUCOSE BLD STRIP.AUTO-MCNC: 187 MG/DL (ref 65–100)
GLUCOSE BLD STRIP.AUTO-MCNC: 202 MG/DL (ref 65–100)
GLUCOSE BLD STRIP.AUTO-MCNC: 207 MG/DL (ref 65–100)
GLUCOSE BLD STRIP.AUTO-MCNC: 213 MG/DL (ref 65–100)
GLUCOSE BLD STRIP.AUTO-MCNC: 219 MG/DL (ref 65–100)
GLUCOSE BLD STRIP.AUTO-MCNC: 248 MG/DL (ref 65–100)
GLUCOSE BLD STRIP.AUTO-MCNC: 256 MG/DL (ref 65–100)
GLUCOSE BLD STRIP.AUTO-MCNC: 345 MG/DL (ref 65–100)
GLUCOSE SERPL-MCNC: 149 MG/DL (ref 65–100)
GLUCOSE SERPL-MCNC: 167 MG/DL (ref 65–100)
GLUCOSE SERPL-MCNC: 179 MG/DL (ref 65–100)
GLUCOSE SERPL-MCNC: 224 MG/DL (ref 65–100)
GLUCOSE SERPL-MCNC: 393 MG/DL (ref 65–100)
GLUCOSE, GLC: 117 MG/DL
GLUCOSE, GLC: 128 MG/DL
GLUCOSE, GLC: 133 MG/DL
GLUCOSE, GLC: 141 MG/DL
GLUCOSE, GLC: 143 MG/DL
GLUCOSE, GLC: 153 MG/DL
GLUCOSE, GLC: 157 MG/DL
GLUCOSE, GLC: 158 MG/DL
GLUCOSE, GLC: 164 MG/DL
GLUCOSE, GLC: 169 MG/DL
GLUCOSE, GLC: 175 MG/DL
GLUCOSE, GLC: 185 MG/DL
GLUCOSE, GLC: 187 MG/DL
GLUCOSE, GLC: 202 MG/DL
GLUCOSE, GLC: 207 MG/DL
GLUCOSE, GLC: 213 MG/DL
GLUCOSE, GLC: 219 MG/DL
GLUCOSE, GLC: 248 MG/DL
GLUCOSE, GLC: 256 MG/DL
GLUCOSE, GLC: 345 MG/DL
HCT VFR BLD AUTO: 29.9 % (ref 36.6–50.3)
HGB BLD-MCNC: 10.8 G/DL (ref 12.1–17)
HIGH TARGET, HITG: 250 MG/DL
IMM GRANULOCYTES # BLD AUTO: 0.2 K/UL (ref 0–0.04)
IMM GRANULOCYTES NFR BLD AUTO: 2 % (ref 0–0.5)
INSULIN ADMINSTERED, INSADM: 1.2 UNITS/HOUR
INSULIN ADMINSTERED, INSADM: 1.5 UNITS/HOUR
INSULIN ADMINSTERED, INSADM: 1.8 UNITS/HOUR
INSULIN ADMINSTERED, INSADM: 10.3 UNITS/HOUR
INSULIN ADMINSTERED, INSADM: 10.7 UNITS/HOUR
INSULIN ADMINSTERED, INSADM: 11.3 UNITS/HOUR
INSULIN ADMINSTERED, INSADM: 13.7 UNITS/HOUR
INSULIN ADMINSTERED, INSADM: 17.1 UNITS/HOUR
INSULIN ADMINSTERED, INSADM: 2 UNITS/HOUR
INSULIN ADMINSTERED, INSADM: 2.5 UNITS/HOUR
INSULIN ADMINSTERED, INSADM: 2.6 UNITS/HOUR
INSULIN ADMINSTERED, INSADM: 2.7 UNITS/HOUR
INSULIN ADMINSTERED, INSADM: 2.9 UNITS/HOUR
INSULIN ADMINSTERED, INSADM: 3.1 UNITS/HOUR
INSULIN ADMINSTERED, INSADM: 3.1 UNITS/HOUR
INSULIN ADMINSTERED, INSADM: 3.6 UNITS/HOUR
INSULIN ADMINSTERED, INSADM: 4 UNITS/HOUR
INSULIN ADMINSTERED, INSADM: 4.5 UNITS/HOUR
INSULIN ADMINSTERED, INSADM: 6.5 UNITS/HOUR
INSULIN ADMINSTERED, INSADM: 8.9 UNITS/HOUR
INSULIN ORDER, INSORD: 1.2 UNITS/HOUR
INSULIN ORDER, INSORD: 1.5 UNITS/HOUR
INSULIN ORDER, INSORD: 1.8 UNITS/HOUR
INSULIN ORDER, INSORD: 10.3 UNITS/HOUR
INSULIN ORDER, INSORD: 10.7 UNITS/HOUR
INSULIN ORDER, INSORD: 11.3 UNITS/HOUR
INSULIN ORDER, INSORD: 13.7 UNITS/HOUR
INSULIN ORDER, INSORD: 17.1 UNITS/HOUR
INSULIN ORDER, INSORD: 2 UNITS/HOUR
INSULIN ORDER, INSORD: 2.5 UNITS/HOUR
INSULIN ORDER, INSORD: 2.6 UNITS/HOUR
INSULIN ORDER, INSORD: 2.7 UNITS/HOUR
INSULIN ORDER, INSORD: 2.9 UNITS/HOUR
INSULIN ORDER, INSORD: 3.1 UNITS/HOUR
INSULIN ORDER, INSORD: 3.1 UNITS/HOUR
INSULIN ORDER, INSORD: 3.6 UNITS/HOUR
INSULIN ORDER, INSORD: 4 UNITS/HOUR
INSULIN ORDER, INSORD: 4.5 UNITS/HOUR
INSULIN ORDER, INSORD: 6.5 UNITS/HOUR
INSULIN ORDER, INSORD: 8.9 UNITS/HOUR
LACTATE SERPL-SCNC: 1 MMOL/L (ref 0.4–2)
LACTATE SERPL-SCNC: 2.3 MMOL/L (ref 0.4–2)
LACTATE SERPL-SCNC: 3.5 MMOL/L (ref 0.4–2)
LOW TARGET, LOT: 150 MG/DL
LYMPHOCYTES # BLD: 1.6 K/UL (ref 0.8–3.5)
LYMPHOCYTES NFR BLD: 13 % (ref 12–49)
MAGNESIUM SERPL-MCNC: 2 MG/DL (ref 1.6–2.4)
MAGNESIUM SERPL-MCNC: 2 MG/DL (ref 1.6–2.4)
MAGNESIUM SERPL-MCNC: 2.1 MG/DL (ref 1.6–2.4)
MAGNESIUM SERPL-MCNC: 2.3 MG/DL (ref 1.6–2.4)
MCH RBC QN AUTO: 31.6 PG (ref 26–34)
MCHC RBC AUTO-ENTMCNC: 36.1 G/DL (ref 30–36.5)
MCV RBC AUTO: 87.4 FL (ref 80–99)
MINUTES UNTIL NEXT BG, NBG: 120 MIN
MINUTES UNTIL NEXT BG, NBG: 120 MIN
MINUTES UNTIL NEXT BG, NBG: 60 MIN
MONOCYTES # BLD: 1 K/UL (ref 0–1)
MONOCYTES NFR BLD: 8 % (ref 5–13)
MULTIPLIER, MUL: 0.01
MULTIPLIER, MUL: 0.01
MULTIPLIER, MUL: 0.03
MULTIPLIER, MUL: 0.04
MULTIPLIER, MUL: 0.04
MULTIPLIER, MUL: 0.06
MULTIPLIER, MUL: 0.07
NEUTS SEG # BLD: 9.4 K/UL (ref 1.8–8)
NEUTS SEG NFR BLD: 77 % (ref 32–75)
NRBC # BLD: 0 K/UL (ref 0–0.01)
NRBC BLD-RTO: 0 PER 100 WBC
ORDER INITIALS, ORDINIT: NORMAL
P-R INTERVAL, ECG05: 168 MS
PHOSPHATE SERPL-MCNC: 1.6 MG/DL (ref 2.6–4.7)
PHOSPHATE SERPL-MCNC: 1.7 MG/DL (ref 2.6–4.7)
PHOSPHATE SERPL-MCNC: 1.7 MG/DL (ref 2.6–4.7)
PHOSPHATE SERPL-MCNC: 2 MG/DL (ref 2.6–4.7)
PHOSPHATE SERPL-MCNC: 2.9 MG/DL (ref 2.6–4.7)
PHOSPHATE SERPL-MCNC: 3.6 MG/DL (ref 2.6–4.7)
PLATELET # BLD AUTO: 358 K/UL (ref 150–400)
PMV BLD AUTO: 9.5 FL (ref 8.9–12.9)
POTASSIUM SERPL-SCNC: 3.7 MMOL/L (ref 3.5–5.1)
POTASSIUM SERPL-SCNC: 3.8 MMOL/L (ref 3.5–5.1)
POTASSIUM SERPL-SCNC: 4 MMOL/L (ref 3.5–5.1)
POTASSIUM SERPL-SCNC: 4 MMOL/L (ref 3.5–5.1)
POTASSIUM SERPL-SCNC: 4.5 MMOL/L (ref 3.5–5.1)
Q-T INTERVAL, ECG07: 414 MS
QRS DURATION, ECG06: 128 MS
QTC CALCULATION (BEZET), ECG08: 503 MS
RBC # BLD AUTO: 3.42 M/UL (ref 4.1–5.7)
SERVICE CMNT-IMP: ABNORMAL
SODIUM SERPL-SCNC: 131 MMOL/L (ref 136–145)
SODIUM SERPL-SCNC: 136 MMOL/L (ref 136–145)
SODIUM SERPL-SCNC: 137 MMOL/L (ref 136–145)
SODIUM SERPL-SCNC: 138 MMOL/L (ref 136–145)
SODIUM SERPL-SCNC: 138 MMOL/L (ref 136–145)
VENTRICULAR RATE, ECG03: 89 BPM
WBC # BLD AUTO: 12.2 K/UL (ref 4.1–11.1)

## 2020-03-08 PROCEDURE — 74011636637 HC RX REV CODE- 636/637: Performed by: EMERGENCY MEDICINE

## 2020-03-08 PROCEDURE — 82962 GLUCOSE BLOOD TEST: CPT

## 2020-03-08 PROCEDURE — 74011250636 HC RX REV CODE- 250/636: Performed by: INTERNAL MEDICINE

## 2020-03-08 PROCEDURE — 74011250637 HC RX REV CODE- 250/637: Performed by: HOSPITALIST

## 2020-03-08 PROCEDURE — 84100 ASSAY OF PHOSPHORUS: CPT

## 2020-03-08 PROCEDURE — 74011000250 HC RX REV CODE- 250: Performed by: HOSPITALIST

## 2020-03-08 PROCEDURE — 74011000258 HC RX REV CODE- 258: Performed by: EMERGENCY MEDICINE

## 2020-03-08 PROCEDURE — 80048 BASIC METABOLIC PNL TOTAL CA: CPT

## 2020-03-08 PROCEDURE — 74011250636 HC RX REV CODE- 250/636: Performed by: HOSPITALIST

## 2020-03-08 PROCEDURE — 85025 COMPLETE CBC W/AUTO DIFF WBC: CPT

## 2020-03-08 PROCEDURE — 74011000250 HC RX REV CODE- 250: Performed by: INTERNAL MEDICINE

## 2020-03-08 PROCEDURE — 83735 ASSAY OF MAGNESIUM: CPT

## 2020-03-08 PROCEDURE — 83605 ASSAY OF LACTIC ACID: CPT

## 2020-03-08 PROCEDURE — 74011000258 HC RX REV CODE- 258: Performed by: HOSPITALIST

## 2020-03-08 PROCEDURE — 74011250636 HC RX REV CODE- 250/636: Performed by: GENERAL ACUTE CARE HOSPITAL

## 2020-03-08 PROCEDURE — 36415 COLL VENOUS BLD VENIPUNCTURE: CPT

## 2020-03-08 PROCEDURE — 65620000000 HC RM CCU GENERAL

## 2020-03-08 RX ORDER — SODIUM CHLORIDE 0.9 % (FLUSH) 0.9 %
5-10 SYRINGE (ML) INJECTION AS NEEDED
Status: DISCONTINUED | OUTPATIENT
Start: 2020-03-08 | End: 2020-03-11 | Stop reason: HOSPADM

## 2020-03-08 RX ORDER — SODIUM CHLORIDE 0.9 % (FLUSH) 0.9 %
5-10 SYRINGE (ML) INJECTION EVERY 8 HOURS
Status: DISCONTINUED | OUTPATIENT
Start: 2020-03-08 | End: 2020-03-11 | Stop reason: HOSPADM

## 2020-03-08 RX ORDER — POTASSIUM CHLORIDE, DEXTROSE MONOHYDRATE AND SODIUM CHLORIDE 300; 5; 900 MG/100ML; G/100ML; MG/100ML
INJECTION, SOLUTION INTRAVENOUS CONTINUOUS
Status: DISCONTINUED | OUTPATIENT
Start: 2020-03-08 | End: 2020-03-09

## 2020-03-08 RX ADMIN — POTASSIUM CHLORIDE, DEXTROSE MONOHYDRATE AND SODIUM CHLORIDE: 300; 5; 900 INJECTION, SOLUTION INTRAVENOUS at 10:22

## 2020-03-08 RX ADMIN — POTASSIUM CHLORIDE, DEXTROSE MONOHYDRATE AND SODIUM CHLORIDE: 300; 5; 900 INJECTION, SOLUTION INTRAVENOUS at 17:01

## 2020-03-08 RX ADMIN — SODIUM CHLORIDE: 900 INJECTION, SOLUTION INTRAVENOUS at 19:42

## 2020-03-08 RX ADMIN — Medication 10 ML: at 06:12

## 2020-03-08 RX ADMIN — SODIUM CHLORIDE 2 UNITS/HR: 9 INJECTION, SOLUTION INTRAVENOUS at 10:37

## 2020-03-08 RX ADMIN — Medication 10 ML: at 03:01

## 2020-03-08 RX ADMIN — HEPARIN SODIUM 5000 UNITS: 5000 INJECTION INTRAVENOUS; SUBCUTANEOUS at 22:21

## 2020-03-08 RX ADMIN — SODIUM CHLORIDE 10 MG: 9 INJECTION INTRAMUSCULAR; INTRAVENOUS; SUBCUTANEOUS at 12:59

## 2020-03-08 RX ADMIN — Medication 10 ML: at 22:21

## 2020-03-08 RX ADMIN — POTASSIUM CHLORIDE, DEXTROSE MONOHYDRATE AND SODIUM CHLORIDE: 300; 5; 900 INJECTION, SOLUTION INTRAVENOUS at 03:00

## 2020-03-08 RX ADMIN — HEPARIN SODIUM 5000 UNITS: 5000 INJECTION INTRAVENOUS; SUBCUTANEOUS at 13:02

## 2020-03-08 RX ADMIN — Medication 10 ML: at 17:11

## 2020-03-08 RX ADMIN — Medication 10 ML: at 12:59

## 2020-03-08 RX ADMIN — SODIUM CHLORIDE 10 MG: 9 INJECTION INTRAMUSCULAR; INTRAVENOUS; SUBCUTANEOUS at 20:43

## 2020-03-08 RX ADMIN — Medication 1 AMPULE: at 08:15

## 2020-03-08 RX ADMIN — Medication 10 ML: at 13:02

## 2020-03-08 RX ADMIN — Medication 10 ML: at 04:17

## 2020-03-08 RX ADMIN — HEPARIN SODIUM 5000 UNITS: 5000 INJECTION INTRAVENOUS; SUBCUTANEOUS at 06:10

## 2020-03-08 RX ADMIN — CALCIUM GLUCONATE 1 G: 98 INJECTION, SOLUTION INTRAVENOUS at 19:42

## 2020-03-08 RX ADMIN — ONDANSETRON 4 MG: 2 INJECTION INTRAMUSCULAR; INTRAVENOUS at 17:10

## 2020-03-08 RX ADMIN — ONDANSETRON 4 MG: 2 INJECTION INTRAMUSCULAR; INTRAVENOUS at 01:54

## 2020-03-08 RX ADMIN — SODIUM CHLORIDE: 900 INJECTION, SOLUTION INTRAVENOUS at 08:59

## 2020-03-08 RX ADMIN — Medication 1 AMPULE: at 20:42

## 2020-03-08 NOTE — PROGRESS NOTES
TRANSFER - IN REPORT:    Verbal report received from [facility] [employer] (name) on Juliet Jacobson  being received from ED(unit) for urgent transfer      Report consisted of patients Situation, Background, Assessment and   Recommendations(SBAR). Information from the following report(s) SBAR, Kardex, Procedure Summary, Intake/Output, MAR, Recent Results and Cardiac Rhythm NSR was reviewed with the receiving nurse. Opportunity for questions and clarification was provided. Assessment completed upon patients arrival to unit and care assumed. CCU RN at bedside in ED. RUE Basilic PIV will not flush. Insulin, IVF, Bicarb infusing via RUE 20G. VSS.      1900 - Patient received to CCU 2550. LUE PIV site noted swollen, IV difficult to flush. MD contacted for central line request.      Bedside and Verbal shift change report given to Emeka Underwood RN (oncoming nurse) by Chaparrita Day RN (offgoing nurse). Report included the following information SBAR, Kardex, Procedure Summary, Intake/Output, MAR, Recent Results and Cardiac Rhythm NSR.

## 2020-03-08 NOTE — PROGRESS NOTES
Central Line Procedure Note    Indication: Inadequate venous access    Risks, benefits, alternatives explained and patient agrees to proceed. Patient positioned in Trendelenburg. 7-Step Sterility Protocol followed. (cap, mask sterile gown, sterile gloves, large sterile sheet, hand hygiene, 2% chlorhexidine for cutaneous antisepsis)  5 mL 1% Lidocaine placed at insertion site. Right internal jugular cannulated x 1 attempt(s) utilizing the Seldinger technique. Catheter secured & Biopatch applied. Sterile Tegaderm placed. US used in real time with sterile cover  CXR pending.     Care turned over to covering Attending MD.

## 2020-03-08 NOTE — PROGRESS NOTES
0 Received from ED 42year old male in DKA. Alert and complaining of being cold, nauseous, thirsty and legs throbbing in pain. States legs are in constant pain for years. Offered tylenol but declined. Upon assessment only one working IV found, right antecubital. Left antecubital and right upper arm discontinued due to infiltrations. Doug Hugger in place over patient due to hypothermia. Insulin gtt infusing @ 10.8 units /hr. NS @ 25 ml/hr. 1923 Zofran 4 mg IV given due to vomiting approximately 50 ml of brown colored liquid. 1929 BMP drawn and sent to lab.   2000 Dr. Amey Schaumann at bedside to place Central line via [address]. Quad lumen catheter placed without difficulty. 2028 Portable chest x-ray done to confirm line placement. 2029 Insulin gtt increased to 16.2 units/hr for blood sugar of 764  2036 NS with KCL 40 meq/liter started at 200 ml/hr as ordered. 2200 Continues to be nauseous and has vomited several times after receiving Zofran. Hospitalist has been texted for another antiemetic medication. 2300 Vomited 300 ml brown liquid. Medicated with Compazine 10 mg IV.  2342 BMP and Lactic drawn and sent to lab. 0030 Hospitalist texted critical CO2 level of 14 and Lactic level of 3.5.  0121 Hospitalist re-paged. No answer to text. [id number] Hospitalist updated on Critical labs. No orders received. 0130 Continues to be nauseous. Vomited in floor. 0140 Finger stick blood sugar 248. Hospitalist Dr. Puja Zavaleta called. Order received to stop NS with KCL 40 meq and start D5 NS with KCL 40 meq/liter at 150 ml/hr. 0154 Zofran 4 mg IV given. 0200 System enacted for one hour to accomodate the ending of Daylight Saving Time at 2:00 a.m. Jumped to 0300   0407 Repeat Lactic Acid 2.3  0500 Necklace and 's license in patient . Did not want them placed in security. 8846 Needs to stand at bedside to void. Has been up five times to void during the night. 3771 Dr. Tyrel Day at bedside to assess patient.   0700 Bedside and Verbal shift change report given to Evy Cooks RN.  Report included the following information SBAR, Kardex, ED Summary, Procedure Summary, Intake/Output, MAR, Recent Results and Cardiac Rhythm ST.

## 2020-03-08 NOTE — PROGRESS NOTES
3/8/2020    INTENSIVIST PROGRESS NOTE:     Patient seen and evaluated, chart reviewed. Admitted overnight with DKA and severe acidosis. Improving anion gap. Currently sleeping comfortably.       Visit Vitals  /88   Pulse 97   Temp 98.3 °F (36.8 °C)   Resp 14   Ht 5' 9\" (1.753 m)   Wt 70.4 kg (155 lb 3.3 oz)   SpO2 100%   BMI 22.92 kg/m²       General: NAD  Eyes: anicteric  HEENT: dry mucus membranes  CV: RRR  Lungs: CTA B  Abd: soft, +BS  Ext: no cyanosis, no clubbing  Skin: warm and dry  Musculoskeletal: no gross deformities  Neuro: nonfocal    CXR: no acute process   Labs reviewed    A/P:  - DKA - continue insulin drip until AG closed x 2  - FELECIA - IVF, monitor creatinine  - Depression/Bipolar  - seizure disorder  - replete electrolytes   - DVT prophylaxis  Paul Michel MD

## 2020-03-08 NOTE — PROGRESS NOTES
Hospitalist Progress Note    NAME: Emil Palumbo   :  1980   MRN:  [id number]       Assessment / Plan:    Type 1 DM, uncontrolled in DKA  Profound metabolic acidosis (pH 5.49) on admission, improved  FELECIA, improving  Volume depletion  Hyponatremia, improving  Hyperkalemia, resolved  Hypothermia (90.9F), resolved  -on insulin per protocol, AG closed x 2  -Monitor electrolytes and renal function  -cont IVF( changed to D5/NS)  -PRN Antiemetics  -advance diet as tolerated  -will transition to home regimen        Hx Depression / Bipolar   Hx Seizures     Code Status: Full  Surrogate Decision Maker:     DVT Prophylaxis:   SCD  GI Prophylaxis: not indicated       Subjective:     Chief Complaint / Reason for Physician Visit  F/u nausea/vomiting/dka  awake  Discussed with RN events overnight. Review of Systems:  Symptom Y/N Comments  Symptom Y/N Comments   Fever/Chills    Chest Pain     Poor Appetite y   Edema     Cough    Abdominal Pain y    Sputum    Joint Pain     SOB/JOHNSTON    Pruritis/Rash     Nausea/vomit y   Tolerating PT/OT     Diarrhea    Tolerating Diet     Constipation    Other       Could NOT obtain due to:      Objective:     VITALS:   Last 24hrs VS reviewed since prior progress note.  Most recent are:  Patient Vitals for the past 24 hrs:   Temp Pulse Resp BP SpO2   20 1000 -- 100 19 142/74 100 %   20 0900 -- 98 18 134/64 100 %   20 0800 98.1 °F (36.7 °C) 98 26 119/75 100 %   20 0700 -- 97 14 129/88 100 %   20 0600 -- (!) 101 16 144/72 100 %   20 0500 -- (!) 101 18 135/63 99 %   20 0400 98.3 °F (36.8 °C) (!) 102 17 140/64 98 %   20 0300 -- (!) 103 18 140/67 100 %   20 0130 -- (!) 105 20 -- 99 %   20 0100 -- (!) 108 29 121/53 100 %   20 0000 98.7 °F (37.1 °C) (!) 104 18 131/72 100 %   20 2300 98.3 °F (36.8 °C) (!) 105 (!) 32 131/62 100 %   20 2200 -- 99 26 132/66 100 %   20 2100 -- 92 22 139/69 100 %   20 Trupti Flores ) 93.5 °F (34.2 °C) 86 29 112/56 100 %   03/07/20 1930 -- -- -- -- 100 %   03/07/20 1900 -- 85 25 142/59 100 %   03/07/20 1845 -- 85 (!) 32 100/55 100 %   03/07/20 1830 -- 85 30 (!) 117/37 100 %   03/07/20 1815 -- 86 30 104/55 100 %   03/07/20 1800 (!) 92.8 °F (33.8 °C) 87 23 92/56 100 %   03/07/20 1745 -- 83 29 (!) 82/60 100 %   03/07/20 1730 -- 88 (!) 32 113/51 100 %   03/07/20 1715 -- 88 27 (!) 90/32 100 %   03/07/20 1700 (!) 92.1 °F (33.4 °C) 83 26 103/50 100 %   03/07/20 1615 -- 90 25 100/56 100 %   03/07/20 1600 -- 85 30 103/62 --   03/07/20 1545 -- 87 20 109/63 --   03/07/20 1530 -- 87 29 109/64 --   03/07/20 1515 -- 87 30 100/59 --   03/07/20 1500 -- 92 (!) 31 103/64 100 %   03/07/20 1445 -- 87 30 103/58 100 %   03/07/20 1430 -- 87 25 107/64 100 %   03/07/20 1415 -- 87 24 106/56 100 %   03/07/20 1345 -- 86 30 107/58 100 %   03/07/20 1330 -- 83 (!) 34 108/54 100 %   03/07/20 1315 -- 88 (!) 35 104/41 100 %       Intake/Output Summary (Last 24 hours) at 3/8/2020 1307  Last data filed at 3/8/2020 1000  Gross per 24 hour   Intake 5919.91 ml   Output 3630 ml   Net 2289.91 ml        PHYSICAL EXAM:  General: WD, WN. Alert, cooperative, no acute distress    EENT:  EOMI. Anicteric sclerae. MMM  Resp:  CTA bilaterally, no wheezing or rales. No accessory muscle use  CV:  Regular  rhythm,  No edema  GI:  Soft, Non distended, Non tender.  +Bowel sounds  Neurologic:  Alert and oriented X 3, normal speech,   Psych:   Good insight. Not anxious nor agitated  Skin:  No rashes.   No jaundice    Reviewed most current lab test results and cultures  YES  Reviewed most current radiology test results   YES  Review and summation of old records today    NO  Reviewed patient's current orders and MAR    YES  PMH/SH reviewed - no change compared to H&P  ________________________________________________________________________  Care Plan discussed with:    Comments   Patient x    Family      RN x    Care Manager     Consultant Multidiciplinary team rounds were held today with , nursing, pharmacist and clinical coordinator. Patient's plan of care was discussed; medications were reviewed and discharge planning was addressed. ________________________________________________________________________  Total NON critical care TIME:    Minutes    Total CRITICAL CARE TIME Spent:   Minutes non procedure based      Comments   >50% of visit spent in counseling and coordination of care     ________________________________________________________________________  Brodie Baez MD     Procedures: see electronic medical records for all procedures/Xrays and details which were not copied into this note but were reviewed prior to creation of Plan. LABS:  I reviewed today's most current labs and imaging studies. Pertinent labs include:  Recent Labs     03/08/20  0407 03/07/20  1329   WBC 12.2* 11.9*   HGB 10.8* 14.4   HCT 29.9* 45.5    388     Recent Labs     03/08/20  0808 03/08/20  0407 03/07/20  2342  03/07/20  1423    136 131*   < > 120*   K 4.0 4.0 3.7   < > 6.1*    104 97   < > 81*   CO2 26 23 14*   < > <5*   * 224* 393*   < > 972*   BUN 39* 42* 44*   < > 42*   CREA 1.40* 1.56* 1.75*   < > 1.64*   CA 7.6* 8.1* 8.2*   < > 8.3*   MG 2.1 2.1 2.3   < >  --    PHOS 1.6* 1.7* 2.9   < >  --    ALB  --   --   --   --  2.2*   TBILI  --   --   --   --  0.6   SGOT  --   --   --   --  17   ALT  --   --   --   --  31    < > = values in this interval not displayed.        Signed: Brodie Baez MD

## 2020-03-09 LAB
ADMINISTERED INITIALS, ADMINIT: NORMAL
ANION GAP SERPL CALC-SCNC: 2 MMOL/L (ref 5–15)
ANION GAP SERPL CALC-SCNC: 2 MMOL/L (ref 5–15)
BUN SERPL-MCNC: 13 MG/DL (ref 6–20)
BUN SERPL-MCNC: 8 MG/DL (ref 6–20)
BUN/CREAT SERPL: 10 (ref 12–20)
BUN/CREAT SERPL: 14 (ref 12–20)
CALCIUM SERPL-MCNC: 7.5 MG/DL (ref 8.5–10.1)
CALCIUM SERPL-MCNC: 7.7 MG/DL (ref 8.5–10.1)
CHLORIDE SERPL-SCNC: 104 MMOL/L (ref 97–108)
CHLORIDE SERPL-SCNC: 107 MMOL/L (ref 97–108)
CO2 SERPL-SCNC: 27 MMOL/L (ref 21–32)
CO2 SERPL-SCNC: 29 MMOL/L (ref 21–32)
CREAT SERPL-MCNC: 0.84 MG/DL (ref 0.7–1.3)
CREAT SERPL-MCNC: 0.93 MG/DL (ref 0.7–1.3)
D50 ADMINISTERED, D50ADM: 0 ML
D50 ORDER, D50ORD: 0 ML
ERYTHROCYTE [DISTWIDTH] IN BLOOD BY AUTOMATED COUNT: 13.6 % (ref 11.5–14.5)
EST. AVERAGE GLUCOSE BLD GHB EST-MCNC: 352 MG/DL
GLSCOM COMMENTS: NORMAL
GLUCOSE BLD STRIP.AUTO-MCNC: 160 MG/DL (ref 65–100)
GLUCOSE BLD STRIP.AUTO-MCNC: 165 MG/DL (ref 65–100)
GLUCOSE BLD STRIP.AUTO-MCNC: 169 MG/DL (ref 65–100)
GLUCOSE BLD STRIP.AUTO-MCNC: 169 MG/DL (ref 65–100)
GLUCOSE BLD STRIP.AUTO-MCNC: 176 MG/DL (ref 65–100)
GLUCOSE BLD STRIP.AUTO-MCNC: 185 MG/DL (ref 65–100)
GLUCOSE BLD STRIP.AUTO-MCNC: 199 MG/DL (ref 65–100)
GLUCOSE BLD STRIP.AUTO-MCNC: 200 MG/DL (ref 65–100)
GLUCOSE BLD STRIP.AUTO-MCNC: 203 MG/DL (ref 65–100)
GLUCOSE BLD STRIP.AUTO-MCNC: 213 MG/DL (ref 65–100)
GLUCOSE BLD STRIP.AUTO-MCNC: 346 MG/DL (ref 65–100)
GLUCOSE SERPL-MCNC: 184 MG/DL (ref 65–100)
GLUCOSE SERPL-MCNC: 221 MG/DL (ref 65–100)
GLUCOSE, GLC: 160 MG/DL
GLUCOSE, GLC: 165 MG/DL
GLUCOSE, GLC: 169 MG/DL
GLUCOSE, GLC: 169 MG/DL
GLUCOSE, GLC: 176 MG/DL
GLUCOSE, GLC: 185 MG/DL
GLUCOSE, GLC: 199 MG/DL
GLUCOSE, GLC: 203 MG/DL
HBA1C MFR BLD: 13.9 % (ref 4–5.6)
HCT VFR BLD AUTO: 27 % (ref 36.6–50.3)
HGB BLD-MCNC: 9.3 G/DL (ref 12.1–17)
HIGH TARGET, HITG: 250 MG/DL
INSULIN ADMINSTERED, INSADM: 1.5 UNITS/HOUR
INSULIN ADMINSTERED, INSADM: 1.6 UNITS/HOUR
INSULIN ADMINSTERED, INSADM: 1.7 UNITS/HOUR
INSULIN ADMINSTERED, INSADM: 1.9 UNITS/HOUR
INSULIN ADMINSTERED, INSADM: 2.1 UNITS/HOUR
INSULIN ADMINSTERED, INSADM: 2.1 UNITS/HOUR
INSULIN ORDER, INSORD: 1.5 UNITS/HOUR
INSULIN ORDER, INSORD: 1.6 UNITS/HOUR
INSULIN ORDER, INSORD: 1.7 UNITS/HOUR
INSULIN ORDER, INSORD: 1.9 UNITS/HOUR
INSULIN ORDER, INSORD: 2.1 UNITS/HOUR
INSULIN ORDER, INSORD: 2.1 UNITS/HOUR
LOW TARGET, LOT: 150 MG/DL
MAGNESIUM SERPL-MCNC: 2 MG/DL (ref 1.6–2.4)
MAGNESIUM SERPL-MCNC: 2 MG/DL (ref 1.6–2.4)
MCH RBC QN AUTO: 31.8 PG (ref 26–34)
MCHC RBC AUTO-ENTMCNC: 34.4 G/DL (ref 30–36.5)
MCV RBC AUTO: 92.5 FL (ref 80–99)
MINUTES UNTIL NEXT BG, NBG: 120 MIN
MINUTES UNTIL NEXT BG, NBG: 60 MIN
MULTIPLIER, MUL: 0.01
NRBC # BLD: 0 K/UL (ref 0–0.01)
NRBC BLD-RTO: 0 PER 100 WBC
ORDER INITIALS, ORDINIT: NORMAL
PHOSPHATE SERPL-MCNC: 1.4 MG/DL (ref 2.6–4.7)
PHOSPHATE SERPL-MCNC: 1.5 MG/DL (ref 2.6–4.7)
PLATELET # BLD AUTO: 245 K/UL (ref 150–400)
PMV BLD AUTO: 9.7 FL (ref 8.9–12.9)
POTASSIUM SERPL-SCNC: 3.6 MMOL/L (ref 3.5–5.1)
POTASSIUM SERPL-SCNC: 3.7 MMOL/L (ref 3.5–5.1)
RBC # BLD AUTO: 2.92 M/UL (ref 4.1–5.7)
SERVICE CMNT-IMP: ABNORMAL
SODIUM SERPL-SCNC: 135 MMOL/L (ref 136–145)
SODIUM SERPL-SCNC: 136 MMOL/L (ref 136–145)
WBC # BLD AUTO: 9.6 K/UL (ref 4.1–11.1)

## 2020-03-09 PROCEDURE — 74011250636 HC RX REV CODE- 250/636: Performed by: INTERNAL MEDICINE

## 2020-03-09 PROCEDURE — 74011000250 HC RX REV CODE- 250: Performed by: INTERNAL MEDICINE

## 2020-03-09 PROCEDURE — 85027 COMPLETE CBC AUTOMATED: CPT

## 2020-03-09 PROCEDURE — 65270000029 HC RM PRIVATE

## 2020-03-09 PROCEDURE — 84100 ASSAY OF PHOSPHORUS: CPT

## 2020-03-09 PROCEDURE — 83036 HEMOGLOBIN GLYCOSYLATED A1C: CPT

## 2020-03-09 PROCEDURE — 83735 ASSAY OF MAGNESIUM: CPT

## 2020-03-09 PROCEDURE — 80048 BASIC METABOLIC PNL TOTAL CA: CPT

## 2020-03-09 PROCEDURE — 74011636637 HC RX REV CODE- 636/637: Performed by: FAMILY MEDICINE

## 2020-03-09 PROCEDURE — 74011250636 HC RX REV CODE- 250/636: Performed by: FAMILY MEDICINE

## 2020-03-09 PROCEDURE — 74011636637 HC RX REV CODE- 636/637: Performed by: INTERNAL MEDICINE

## 2020-03-09 PROCEDURE — 74011250636 HC RX REV CODE- 250/636: Performed by: GENERAL ACUTE CARE HOSPITAL

## 2020-03-09 PROCEDURE — 74011250637 HC RX REV CODE- 250/637: Performed by: INTERNAL MEDICINE

## 2020-03-09 PROCEDURE — 74011250637 HC RX REV CODE- 250/637: Performed by: HOSPITALIST

## 2020-03-09 PROCEDURE — 36415 COLL VENOUS BLD VENIPUNCTURE: CPT

## 2020-03-09 PROCEDURE — 82962 GLUCOSE BLOOD TEST: CPT

## 2020-03-09 RX ORDER — POTASSIUM CHLORIDE AND SODIUM CHLORIDE 900; 300 MG/100ML; MG/100ML
INJECTION, SOLUTION INTRAVENOUS CONTINUOUS
Status: DISCONTINUED | OUTPATIENT
Start: 2020-03-09 | End: 2020-03-10

## 2020-03-09 RX ORDER — INSULIN LISPRO 100 [IU]/ML
INJECTION, SOLUTION INTRAVENOUS; SUBCUTANEOUS
Status: DISCONTINUED | OUTPATIENT
Start: 2020-03-09 | End: 2020-03-11 | Stop reason: HOSPADM

## 2020-03-09 RX ORDER — FLUCONAZOLE 100 MG/1
100 TABLET ORAL DAILY
Status: ON HOLD | COMMUNITY
Start: 2020-03-01 | End: 2020-03-09 | Stop reason: CLARIF

## 2020-03-09 RX ORDER — HYDRALAZINE HYDROCHLORIDE 20 MG/ML
10 INJECTION INTRAMUSCULAR; INTRAVENOUS EVERY 6 HOURS
Status: DISCONTINUED | OUTPATIENT
Start: 2020-03-09 | End: 2020-03-11 | Stop reason: HOSPADM

## 2020-03-09 RX ORDER — PANTOPRAZOLE SODIUM 40 MG/1
40 TABLET, DELAYED RELEASE ORAL
Status: DISCONTINUED | OUTPATIENT
Start: 2020-03-09 | End: 2020-03-11 | Stop reason: HOSPADM

## 2020-03-09 RX ORDER — DEXTROSE 50 % IN WATER (D50W) INTRAVENOUS SYRINGE
25-50 AS NEEDED
Status: DISCONTINUED | OUTPATIENT
Start: 2020-03-09 | End: 2020-03-11 | Stop reason: HOSPADM

## 2020-03-09 RX ORDER — INSULIN GLARGINE 100 [IU]/ML
0.2 INJECTION, SOLUTION SUBCUTANEOUS DAILY
Status: DISCONTINUED | OUTPATIENT
Start: 2020-03-10 | End: 2020-03-10

## 2020-03-09 RX ORDER — INSULIN GLARGINE 100 [IU]/ML
11 INJECTION, SOLUTION SUBCUTANEOUS ONCE
Status: COMPLETED | OUTPATIENT
Start: 2020-03-09 | End: 2020-03-09

## 2020-03-09 RX ORDER — INSULIN GLARGINE 100 [IU]/ML
8 INJECTION, SOLUTION SUBCUTANEOUS
Status: DISCONTINUED | OUTPATIENT
Start: 2020-03-09 | End: 2020-03-10

## 2020-03-09 RX ORDER — MAGNESIUM SULFATE 100 %
4 CRYSTALS MISCELLANEOUS AS NEEDED
Status: DISCONTINUED | OUTPATIENT
Start: 2020-03-09 | End: 2020-03-11 | Stop reason: HOSPADM

## 2020-03-09 RX ADMIN — HEPARIN SODIUM 5000 UNITS: 5000 INJECTION INTRAVENOUS; SUBCUTANEOUS at 23:01

## 2020-03-09 RX ADMIN — INSULIN GLARGINE 11 UNITS: 100 INJECTION, SOLUTION SUBCUTANEOUS at 11:03

## 2020-03-09 RX ADMIN — INSULIN LISPRO 7 UNITS: 100 INJECTION, SOLUTION INTRAVENOUS; SUBCUTANEOUS at 16:30

## 2020-03-09 RX ADMIN — POTASSIUM CHLORIDE, DEXTROSE MONOHYDRATE AND SODIUM CHLORIDE: 300; 5; 900 INJECTION, SOLUTION INTRAVENOUS at 09:14

## 2020-03-09 RX ADMIN — HYDRALAZINE HYDROCHLORIDE 10 MG: 20 INJECTION INTRAMUSCULAR; INTRAVENOUS at 18:54

## 2020-03-09 RX ADMIN — Medication 10 ML: at 14:14

## 2020-03-09 RX ADMIN — SODIUM CHLORIDE 10 MG: 9 INJECTION INTRAMUSCULAR; INTRAVENOUS; SUBCUTANEOUS at 17:42

## 2020-03-09 RX ADMIN — DIBASIC SODIUM PHOSPHATE, MONOBASIC POTASSIUM PHOSPHATE AND MONOBASIC SODIUM PHOSPHATE 2 TABLET: 852; 155; 130 TABLET ORAL at 17:51

## 2020-03-09 RX ADMIN — HEPARIN SODIUM 5000 UNITS: 5000 INJECTION INTRAVENOUS; SUBCUTANEOUS at 14:15

## 2020-03-09 RX ADMIN — DIBASIC SODIUM PHOSPHATE, MONOBASIC POTASSIUM PHOSPHATE AND MONOBASIC SODIUM PHOSPHATE 2 TABLET: 852; 155; 130 TABLET ORAL at 10:13

## 2020-03-09 RX ADMIN — Medication 10 ML: at 05:56

## 2020-03-09 RX ADMIN — ONDANSETRON 4 MG: 2 INJECTION INTRAMUSCULAR; INTRAVENOUS at 23:02

## 2020-03-09 RX ADMIN — DIBASIC SODIUM PHOSPHATE, MONOBASIC POTASSIUM PHOSPHATE AND MONOBASIC SODIUM PHOSPHATE 2 TABLET: 852; 155; 130 TABLET ORAL at 23:06

## 2020-03-09 RX ADMIN — Medication 10 ML: at 23:02

## 2020-03-09 RX ADMIN — Medication 1 AMPULE: at 09:05

## 2020-03-09 RX ADMIN — PANTOPRAZOLE SODIUM 40 MG: 40 TABLET, DELAYED RELEASE ORAL at 10:13

## 2020-03-09 RX ADMIN — INSULIN LISPRO 2 UNITS: 100 INJECTION, SOLUTION INTRAVENOUS; SUBCUTANEOUS at 23:02

## 2020-03-09 RX ADMIN — ONDANSETRON 4 MG: 2 INJECTION INTRAMUSCULAR; INTRAVENOUS at 15:31

## 2020-03-09 RX ADMIN — POTASSIUM CHLORIDE, DEXTROSE MONOHYDRATE AND SODIUM CHLORIDE: 300; 5; 900 INJECTION, SOLUTION INTRAVENOUS at 00:33

## 2020-03-09 RX ADMIN — SODIUM CHLORIDE AND POTASSIUM CHLORIDE: 9; 2.98 INJECTION, SOLUTION INTRAVENOUS at 13:34

## 2020-03-09 RX ADMIN — HEPARIN SODIUM 5000 UNITS: 5000 INJECTION INTRAVENOUS; SUBCUTANEOUS at 06:00

## 2020-03-09 NOTE — INTERDISCIPLINARY ROUNDS
Interdisciplinary team rounds were held 3/9/2020 with the following team members:Care Management, Diabetes Treatment Specialist, Nursing, Nutrition, Pharmacy, Physical Therapy, Physician, Respiratory Therapy and Clinical Coordinator. Plan of care discussed. See clinical pathway and/or care plan for interventions and desired outcomes.

## 2020-03-09 NOTE — PROGRESS NOTES
1900 - 2000 Bedside and Verbal shift change report given to Ekaterina Heck (oncoming nurse) by Scarlett Cortez (offgoing nurse). Report included the following information SBAR, Kardex, ED Summary, Intake/Output, MAR, Recent Results and Cardiac Rhythm SR / ST.     2000 - 2200 Shift assessment complete, c / o constant pain in both legs and feet 5/10 and stated \" I don't need anything, I am fine \". Gluco stabilizer ongoing, vss. After a few minutes of his K phos infusing, he called the nurse asked about the med and vehemently refused to complete the infusion because of the potassium in it. He does not want potassium IV but will take  PO.    2200 - 0000 Nausea / vomiting , prn compazine given. Assistance given to the bedside commode. 0000 - 0200 Reassessment complete, no changes. See flow sheet for details. 0200 - 0400 Assistance given to the bedside commode. 0400 - 0600 Reassessment complete, no changes noted. See flow sheet for details. 0600 - 0800  C / O of being disturbed by the alarms from the infusion pump, monitor and gluco stabilizer and pulled his blankets over his head. Bedside and Verbal shift change report given to Carla Duque (oncoming nurse) by Ekaterina Heck (offgoing nurse).  Report included the following information SBAR, Kardex, ED Summary, Procedure Summary, Intake/Output, Recent Results and Cardiac Rhythm SR / ST.

## 2020-03-09 NOTE — PROGRESS NOTES
Hospitalist Progress Note    NAME: Amisha Webster   :  1980   MRN:  [id number]       Assessment / Plan:    Type 1 DM, uncontrolled in DKA  Profound metabolic acidosis (pH 2.03) on admission, improved  FELECIA, improving  Volume depletion  Hyponatremia, improving  Hyperkalemia, resolved  Hypothermia (90.9F), resolved  -on insulin per protocol, AG closed x 2  -Monitor electrolytes and renal function  -cont IVF( changed to D5/NS)  -PRN Antiemetics  -advance diet as tolerated  - transition to home regimen        Hx Depression / Bipolar   Hx Seizures     Code Status: Full  Surrogate Decision Maker:     DVT Prophylaxis:   SCD  GI Prophylaxis: not indicated       Subjective:     Chief Complaint / Reason for Physician Visit  Feeling ok today   Discussed with RN events overnight. Review of Systems:  Symptom Y/N Comments  Symptom Y/N Comments   Fever/Chills    Chest Pain     Poor Appetite y   Edema     Cough    Abdominal Pain y    Sputum    Joint Pain     SOB/JOHNSTON    Pruritis/Rash     Nausea/vomit y   Tolerating PT/OT     Diarrhea    Tolerating Diet     Constipation    Other       Could NOT obtain due to:      Objective:     VITALS:   Last 24hrs VS reviewed since prior progress note.  Most recent are:  Patient Vitals for the past 24 hrs:   Temp Pulse Resp BP SpO2   20 1200 -- 91 16 (!) 163/92 --   20 1100 -- 89 -- (!) 160/93 100 %   20 1000 -- 100 15 (!) 168/93 96 %   20 0900 98.6 °F (37 °C) 88 15 144/81 100 %   20 0800 -- 87 13 (!) 144/92 98 %   20 0730 -- 91 14 -- 97 %   20 0700 -- 90 15 (!) 150/91 99 %   20 0600 -- 92 12 153/85 98 %   20 0500 -- 91 16 131/84 95 %   20 0400 99.1 °F (37.3 °C) 89 16 128/82 99 %   20 0300 -- 94 15 157/90 100 %   20 0200 -- 98 19 160/88 99 %   20 0100 -- 93 15 118/71 99 %   20 0000 97.9 °F (36.6 °C) 93 15 131/72 98 %   20 2300 -- 99 14 131/75 99 %   20 2200 -- (!) 103 20 158/89 98 % 03/08/20 2100 -- 95 17 125/71 98 %   03/08/20 2000 98.8 °F (37.1 °C) (!) 105 15 157/86 98 %   03/08/20 1900 -- 98 16 142/84 98 %   03/08/20 1800 -- (!) 101 23 (!) 156/95 100 %   03/08/20 1700 -- (!) 107 21 135/79 --   03/08/20 1600 98.2 °F (36.8 °C) 100 17 139/76 98 %   03/08/20 1500 -- (!) 108 21 134/74 98 %   03/08/20 1400 -- (!) 104 16 135/86 96 %   03/08/20 1300 -- (!) 103 20 153/81 100 %       Intake/Output Summary (Last 24 hours) at 3/9/2020 1255  Last data filed at 3/9/2020 1200  Gross per 24 hour   Intake 4048.66 ml   Output 4411 ml   Net -362.34 ml        PHYSICAL EXAM:  General: WD, WN. Alert, cooperative, no acute distress    EENT:  EOMI. Anicteric sclerae. MMM  Resp:  CTA bilaterally, no wheezing or rales. No accessory muscle use  CV:  Regular  rhythm,  No edema  GI:  Soft, Non distended, Non tender.  +Bowel sounds  Neurologic:  Alert and oriented X 3, normal speech,   Psych:   Good insight. Not anxious nor agitated  Skin:  No rashes. No jaundice    Reviewed most current lab test results and cultures  YES  Reviewed most current radiology test results   YES  Review and summation of old records today    NO  Reviewed patient's current orders and MAR    YES  PMH/ reviewed - no change compared to H&P  ________________________________________________________________________  Care Plan discussed with:    Comments   Patient x    Family      RN x    Care Manager     Consultant                        Multidiciplinary team rounds were held today with , nursing, pharmacist and clinical coordinator. Patient's plan of care was discussed; medications were reviewed and discharge planning was addressed.      ________________________________________________________________________  Total NON critical care TIME:    Minutes    Total CRITICAL CARE TIME Spent:   Minutes non procedure based      Comments   >50% of visit spent in counseling and coordination of care ________________________________________________________________________  Dagmar Alfonso MD     Procedures: see electronic medical records for all procedures/Xrays and details which were not copied into this note but were reviewed prior to creation of Plan. LABS:  I reviewed today's most current labs and imaging studies. Pertinent labs include:  Recent Labs     03/09/20  0506 03/08/20  0407 03/07/20  1329   WBC 9.6 12.2* 11.9*   HGB 9.3* 10.8* 14.4   HCT 27.0* 29.9* 45.5    358 388     Recent Labs     03/09/20  0919 03/09/20  0506 03/08/20  2245 03/08/20  1736  03/07/20  1423   * 136  --  138   < > 120*   K 3.6 3.7  --  3.8   < > 6.1*    107  --  108   < > 81*   CO2 29 27  --  26   < > <5*   * 184*  --  179*   < > 972*   BUN 8 13  --  26*   < > 42*   CREA 0.84 0.93  --  1.01   < > 1.64*   CA 7.7* 7.5*  --  7.4*   < > 8.3*   MG 2.0 2.0 2.0 2.1   < >  --    PHOS 1.5* 1.4* 1.7* 2.0*   < >  --    ALB  --   --   --   --   --  2.2*   TBILI  --   --   --   --   --  0.6   SGOT  --   --   --   --   --  17   ALT  --   --   --   --   --  31    < > = values in this interval not displayed.        Signed: Dagmar Alfonso MD

## 2020-03-09 NOTE — PROGRESS NOTES
Pharmacy Medication Reconciliation     The patient was interviewed regarding current PTA medication list, use and drug allergies; The patient was questioned regarding use of any other inhalers, topical products, over the counter medications, herbal medications, vitamin products or ophthalmic/nasal/otic medication use. Allergy Update: Patient has no known allergies. Recommendations/Findings: The following amendments were made to the patient's active medication list on file at [address]:   1) Additions: N/A    2) Deletions: N/A    3) Changes: N/A      Pertinent Findings:   Patient was recently prescribed (3/1/20) fluconazole 100 mg daily X 10 days but has not started taking it. He does not know why this was prescribed.     -Clarified PTA med list with patient. PTA medication list was corrected to the following:     Prior to Admission Medications   Prescriptions Last Dose Informant Taking?   insulin NPH/insulin regular (NOVOLIN 70/30 U-100 INSULIN) 100 unit/mL (70-30) injection 3/2/2020 at 0900  Yes   Sig: 15 Units by SubCUTAneous route Before breakfast and dinner. insulin regular (NOVOLIN R) 100 unit/mL injection 3/2/2020 at Unknown time Self Yes   Si-10 Units by SubCUTAneous route Before breakfast, lunch, and dinner. Blood Glucose (mg/dL)       Insulin Dose (units)              150-200                               2               201-250                               4               251-300                               6               301-350                               8               >351                                   10   pantoprazole (PROTONIX) 40 mg tablet 3/2/2020 at Unknown time  Yes   Sig: Take 1 Tab by mouth two (2) times a day. tamsulosin (FLOMAX) 0.4 mg capsule 3/2/2020 at 0900  Yes   Sig: Take 1 Cap by mouth daily.       Facility-Administered Medications: None          Thank you,  Eli Quintero

## 2020-03-09 NOTE — PROGRESS NOTES
3/9/2020    INTENSIVIST PROGRESS NOTE:     Patient seen and evaluated, chart reviewed. Admitted with DKA and severe acidosis. Improving anion gap. No acute events overnight  Currently sleeping comfortably.     No acute distress  No acute complaints    Visit Vitals  BP (!) 150/91   Pulse 90   Temp 99.1 °F (37.3 °C)   Resp 15   Ht 5' 9\" (1.753 m)   Wt 70.4 kg (155 lb 3.3 oz)   SpO2 99%   BMI 22.92 kg/m²       General: NAD  Eyes: anicteric  HEENT: dry mucus membranes  CV: RRR  Lungs: CTA B  Abd: soft, +BS  Ext: no cyanosis, no clubbing  Skin: warm and dry  Musculoskeletal: no gross deformities  Neuro: nonfocal    CXR: no acute process   Labs reviewed    A/P:  - DKA - transition off insulin drip this am  - FELECIA - IVF, monitor creatinine, normalizing  - Depression/Bipolar  - seizure disorder  - replete electrolytes   - DVT prophylaxis  - Transfer to medical bed this am  Ja Turner MD

## 2020-03-09 NOTE — PROGRESS NOTES
Care Management:    MELL: Home with family and follow up with PCP. He now has Medicaid. He has his first appointment with NP at [facility] in [place] . The appointment is not until June 1st. At discharge we can call and see if we can get this moved up since he is post hospital for DKA. Reason for Admission:   DKA and is being treated in the ICU. He was admitted to [facility] this past January with DKA. He now has Medicaid and says it will be easier to be compliant since he has coverage to see doctor and get his medications. RUR Score:     27    He says he has an appointment with a new PCP in [place] for June 1st. He cannot remember the name. Resources/supports as identified by patient/family:   He says his family is very supportive. Top Challenges facing patient (as identified by patient/family and CM): Finances/Medication cost?  He has Medicaid and it is covering his medications. He is not working and friends and family are helping him financially. Transportation? He drives and he has a car. Support system or lack thereof? Family and friends. Living arrangements? Living with friends now. Self-care/ADL's/Cognition? Independent. Current Advanced Directive/Advance Care Plan:                       Full Code. His mom Michiel Canavan  is his NOK. Plan for utilizing home health:    Not anticipated. Care Management Interventions  PCP Verified by CM: Yes  Mode of Transport at Discharge: Other (see comment)(family or a friend)  Current Support Network: Other(Living with friends. Independent with ADL's . No DME. He drives. )  Confirm Follow Up Transport: Family(Self or friend/family)     Uses [facility].

## 2020-03-09 NOTE — DIABETES MGMT
[facility]  CLINICAL NURSE SPECIALIST CONSULT  PROGRAM FOR DIABETES HEALTH    Presentation   Anthony Amaro is a 42 y.o. male admitted with DKA on 3/7/2020. He is currently being transitioned off of the insulin drip. Anion gap closed and BG <250 x2. This man is a non-compliant Type 1 diabetic who did not  Take his insulin at home. He has PMH of bipolar disorder. Darren Boykin by Nursing for advanced diabetes nursing assessment and care, specifically related to   [x] Transitioning off Levine    [x] Inpatient management strategy  [x] Home management assessment  [] Survival skill education    Diabetes-related medical history  Acute complications  DKA  Neurological complications  Peripheral neuropathy  \"my feet hurt all the time\"  Microvascular disease  Retinopathy  Macrovascular disease  none  Other associated conditions     Depression    Diabetes medication history  Drug class Currently in use Discontinued Never used   Biguanide      DDP-4 inhibitor       Sulfonylurea      Thiazolidinedione      GLP-1 RA      SGLT-2 inhibitors      Basal insulin 70/30 15units twice daily     Bolus insulin Regular 2-10units SSI       Subjective   Mr. Rebecca Matthew has had Type 1 diabetes for 16yrs. He states he's had a difficult time managing and understanding his diabetes diagnosis. He states his depression keeps him from adequately caring for himself. He currently does not see an endocrinologist for his diabetes. However, he is open to finding one. I spoke with him re: importance of taking care of himself and what complications can happen if he doesn't (kidney disease/amputation/vision getting worse). Patient reports the following home diabetes self-care practices:  Eating pattern-was hard to assess; he states he 'eats lots of veggies' but has snacks too.     Physical activity pattern; used to be a   Monitoring pattern-\"Never' checks his sugars    Taking medications pattern  [x] Consistent administration  [x] [facility]    Social determinants of health impacting diabetes self-management practices   Struggling with anxiety and/or depression    Objective   Physical exam  General Alert, oriented and in no acute distress. Conversant and cooperative. Vital Signs   Visit Vitals  BP (!) 163/92   Pulse 91   Temp 98.6 °F (37 °C)   Resp 16   Ht 5' 9\" (1.753 m)   Wt 70.4 kg (155 lb 3.3 oz)   SpO2 100%   BMI 22.92 kg/m²   . Skin  Warm and dry. Heart   Regular rate and rhythm. No murmurs, rubs or gallops  Lungs  Clear without rales or rhonchi  Extremities Diabetic foot exam:\"My feet ache all the time\"    Left Foot     Visual Exam: callous - on bottom of foot   Pulse DP: 2+ (normal)   Filament test: reduced sensation      Right Foot   Visual Exam: callous - great toe-\"the callus was black and i scraped it off\"   Pulse DP: 2+ (normal)   Filament test: absent sensation     DP & PT pulses +2. Laboratory  Lab Results   Component Value Date/Time    Hemoglobin A1c 13.7 (H) 01/03/2020 04:47 AM     Lab Results   Component Value Date/Time    LDL, calculated 110.4 (H) 03/22/2017 07:20 PM     Lab Results   Component Value Date/Time    Creatinine (POC) 1.1 01/02/2020 05:48 PM    Creatinine 0.84 03/09/2020 09:19 AM     Lab Results   Component Value Date/Time    Sodium 135 (L) 03/09/2020 09:19 AM    Potassium 3.6 03/09/2020 09:19 AM    Chloride 104 03/09/2020 09:19 AM    CO2 29 03/09/2020 09:19 AM    Anion gap 2 (L) 03/09/2020 09:19 AM    Glucose 221 (H) 03/09/2020 09:19 AM    BUN 8 03/09/2020 09:19 AM    Creatinine 0.84 03/09/2020 09:19 AM    BUN/Creatinine ratio 10 (L) 03/09/2020 09:19 AM    GFR est AA >60 03/09/2020 09:19 AM    GFR est non-AA >60 03/09/2020 09:19 AM    Calcium 7.7 (L) 03/09/2020 09:19 AM    Bilirubin, total 0.6 03/07/2020 02:23 PM    AST (SGOT) 17 03/07/2020 02:23 PM    Alk.  phosphatase 235 (H) 03/07/2020 02:23 PM    Protein, total 6.4 03/07/2020 02:23 PM    Albumin 2.2 (L) 03/07/2020 02:23 PM Globulin 4.2 (H) 03/07/2020 02:23 PM    A-G Ratio 0.5 (L) 03/07/2020 02:23 PM    ALT (SGPT) 31 03/07/2020 02:23 PM     Lab Results   Component Value Date/Time    ALT (SGPT) 31 03/07/2020 02:23 PM       Blood glucose pattern          Evaluation   This man with Type 1 diabetes, has achieved inpatient blood glucose target of 100-180mg/dl. Inpatient blood glucose management has been impacted by  [] Kidney dysfunction  [] Erratic meal consumption  [] Glucocorticoid use  [x]  ___DKA________________    His PTA insulin is 70/30 15units twice daily-He is  noncompliant with his insulin and checking his BG. Recommendations   Recommend:  Basal insulin   [x] 0.2 units/kg/D=14 units    ADD MEALTIME Bolus insulin (when consuming >50% of carbohydrates)  [x] Normal sensitivity=3units Humalog    Corrective insulin  [x] Normal sensitivity    Referral   [] Behavioral health services  [] Inpatient nutrition services  [] Pharmacy services for medication management  [x] Diabetes Self-Management Training through [facility] (Phone [phone] to schedule appointment)**not currently being followed by a doctor** He lives in [place].     Billing Code(s)     [x] [id number] IP subsequent hospital care - 45 minutes      ANEESH Holley  Access via REILLY Chavez [phone] ([phone]

## 2020-03-09 NOTE — PROGRESS NOTES
0715 Report received from Ariana Farmer, [place]. Insulin at 1.6. Maintenance fluids with potassium infusing at 150. Pt lying in bed with eyes closed. 0730 Pt assessed. AAOx4. Calm and cooperative. Room air. SR on monitor. No complaints of N/V/D. Bowel sounds active, no abd tenderness. 0930 Dr. Jarad Gaytan updated on patient condition. Plan to transfer pt to medical unit when bed becomes available. 1100 11u Lantus administered per protocol. Will stop insulin drip in 2 hours. 1200 Pt reassessed. No changes. 1310 Insulin drip stopped and maintenance fluids changed.  after lunch. Diabetes educator at bedside. 1700 CVC removed. Pressure held and tegaderm applied. PIVx2 started. 1742 Pt vomiting. IV compazine administered. 1750 . 7u lispro administered. Will continue to monitor. 2011 Pt stable. /72   TRANSFER - OUT REPORT:    Verbal report given to ASAD Cowan (name) on Noah Baez  being transferred to Gen Surg 2121(unit) for routine progression of care       Report consisted of patients Situation, Background, Assessment and   Recommendations(SBAR). Information from the following report(s) SBAR and Kardex was reviewed with the receiving nurse. Lines:   Peripheral IV 03/09/20 Anterior;Right Forearm (Active)   Site Assessment Clean, dry, & intact 3/9/2020  5:52 PM   Phlebitis Assessment 0 3/9/2020  5:52 PM   Dressing Status Clean, dry, & intact 3/9/2020  5:52 PM   Dressing Type Transparent 3/9/2020  5:52 PM   Hub Color/Line Status Infusing;Blue 3/9/2020  5:52 PM       Peripheral IV 03/09/20 Left;Posterior Hand (Active)   Site Assessment Clean, dry, & intact 3/9/2020  5:52 PM   Phlebitis Assessment 0 3/9/2020  5:52 PM   Infiltration Assessment 0 3/9/2020  5:52 PM   Dressing Status Clean, dry, & intact 3/9/2020  5:52 PM   Dressing Type Transparent 3/9/2020  5:52 PM   Hub Color/Line Status Pink;Flushed 3/9/2020  5:52 PM        Opportunity for questions and clarification was provided. Patient transported with:   Monitor  Registered Nurse

## 2020-03-10 LAB
ANION GAP SERPL CALC-SCNC: 10 MMOL/L (ref 5–15)
BUN SERPL-MCNC: 8 MG/DL (ref 6–20)
BUN/CREAT SERPL: 13 (ref 12–20)
CALCIUM SERPL-MCNC: 7.5 MG/DL (ref 8.5–10.1)
CHLORIDE SERPL-SCNC: 100 MMOL/L (ref 97–108)
CO2 SERPL-SCNC: 23 MMOL/L (ref 21–32)
CREAT SERPL-MCNC: 0.6 MG/DL (ref 0.7–1.3)
GLUCOSE BLD STRIP.AUTO-MCNC: 202 MG/DL (ref 65–100)
GLUCOSE BLD STRIP.AUTO-MCNC: 330 MG/DL (ref 65–100)
GLUCOSE BLD STRIP.AUTO-MCNC: 351 MG/DL (ref 65–100)
GLUCOSE BLD STRIP.AUTO-MCNC: 383 MG/DL (ref 65–100)
GLUCOSE SERPL-MCNC: 240 MG/DL (ref 65–100)
MAGNESIUM SERPL-MCNC: 2 MG/DL (ref 1.6–2.4)
PHOSPHATE SERPL-MCNC: 2.1 MG/DL (ref 2.6–4.7)
POTASSIUM SERPL-SCNC: 3.8 MMOL/L (ref 3.5–5.1)
SERVICE CMNT-IMP: ABNORMAL
SODIUM SERPL-SCNC: 133 MMOL/L (ref 136–145)

## 2020-03-10 PROCEDURE — 83735 ASSAY OF MAGNESIUM: CPT

## 2020-03-10 PROCEDURE — 74011636637 HC RX REV CODE- 636/637: Performed by: FAMILY MEDICINE

## 2020-03-10 PROCEDURE — 74011250637 HC RX REV CODE- 250/637: Performed by: INTERNAL MEDICINE

## 2020-03-10 PROCEDURE — 74011250636 HC RX REV CODE- 250/636: Performed by: INTERNAL MEDICINE

## 2020-03-10 PROCEDURE — 36415 COLL VENOUS BLD VENIPUNCTURE: CPT

## 2020-03-10 PROCEDURE — 82962 GLUCOSE BLOOD TEST: CPT

## 2020-03-10 PROCEDURE — 65270000029 HC RM PRIVATE

## 2020-03-10 PROCEDURE — 80048 BASIC METABOLIC PNL TOTAL CA: CPT

## 2020-03-10 PROCEDURE — 84100 ASSAY OF PHOSPHORUS: CPT

## 2020-03-10 PROCEDURE — 74011250636 HC RX REV CODE- 250/636: Performed by: FAMILY MEDICINE

## 2020-03-10 PROCEDURE — 74011636637 HC RX REV CODE- 636/637: Performed by: INTERNAL MEDICINE

## 2020-03-10 RX ORDER — METOPROLOL TARTRATE 25 MG/1
25 TABLET, FILM COATED ORAL EVERY 12 HOURS
Status: DISCONTINUED | OUTPATIENT
Start: 2020-03-10 | End: 2020-03-11 | Stop reason: HOSPADM

## 2020-03-10 RX ORDER — INSULIN LISPRO 100 [IU]/ML
10 INJECTION, SOLUTION INTRAVENOUS; SUBCUTANEOUS ONCE
Status: COMPLETED | OUTPATIENT
Start: 2020-03-10 | End: 2020-03-10

## 2020-03-10 RX ORDER — LISINOPRIL 5 MG/1
2.5 TABLET ORAL DAILY
Status: DISCONTINUED | OUTPATIENT
Start: 2020-03-11 | End: 2020-03-10

## 2020-03-10 RX ORDER — INSULIN GLARGINE 100 [IU]/ML
0.3 INJECTION, SOLUTION SUBCUTANEOUS DAILY
Status: DISCONTINUED | OUTPATIENT
Start: 2020-03-10 | End: 2020-03-11 | Stop reason: HOSPADM

## 2020-03-10 RX ORDER — LISINOPRIL 5 MG/1
2.5 TABLET ORAL DAILY
Status: DISCONTINUED | OUTPATIENT
Start: 2020-03-10 | End: 2020-03-11

## 2020-03-10 RX ADMIN — Medication 10 ML: at 07:03

## 2020-03-10 RX ADMIN — INSULIN LISPRO 4 UNITS: 100 INJECTION, SOLUTION INTRAVENOUS; SUBCUTANEOUS at 22:12

## 2020-03-10 RX ADMIN — HEPARIN SODIUM 5000 UNITS: 5000 INJECTION INTRAVENOUS; SUBCUTANEOUS at 14:01

## 2020-03-10 RX ADMIN — INSULIN LISPRO 3 UNITS: 100 INJECTION, SOLUTION INTRAVENOUS; SUBCUTANEOUS at 16:30

## 2020-03-10 RX ADMIN — DIBASIC SODIUM PHOSPHATE, MONOBASIC POTASSIUM PHOSPHATE AND MONOBASIC SODIUM PHOSPHATE 2 TABLET: 852; 155; 130 TABLET ORAL at 09:14

## 2020-03-10 RX ADMIN — INSULIN LISPRO 10 UNITS: 100 INJECTION, SOLUTION INTRAVENOUS; SUBCUTANEOUS at 12:10

## 2020-03-10 RX ADMIN — HEPARIN SODIUM 5000 UNITS: 5000 INJECTION INTRAVENOUS; SUBCUTANEOUS at 07:03

## 2020-03-10 RX ADMIN — INSULIN LISPRO 7 UNITS: 100 INJECTION, SOLUTION INTRAVENOUS; SUBCUTANEOUS at 09:14

## 2020-03-10 RX ADMIN — Medication 10 ML: at 12:11

## 2020-03-10 RX ADMIN — HYDRALAZINE HYDROCHLORIDE 10 MG: 20 INJECTION INTRAMUSCULAR; INTRAVENOUS at 07:02

## 2020-03-10 RX ADMIN — SODIUM CHLORIDE AND POTASSIUM CHLORIDE: 9; 2.98 INJECTION, SOLUTION INTRAVENOUS at 01:09

## 2020-03-10 RX ADMIN — Medication 10 ML: at 22:10

## 2020-03-10 RX ADMIN — PANTOPRAZOLE SODIUM 40 MG: 40 TABLET, DELAYED RELEASE ORAL at 07:03

## 2020-03-10 RX ADMIN — DIBASIC SODIUM PHOSPHATE, MONOBASIC POTASSIUM PHOSPHATE AND MONOBASIC SODIUM PHOSPHATE 2 TABLET: 852; 155; 130 TABLET ORAL at 17:15

## 2020-03-10 RX ADMIN — HYDRALAZINE HYDROCHLORIDE 10 MG: 20 INJECTION INTRAMUSCULAR; INTRAVENOUS at 11:56

## 2020-03-10 RX ADMIN — METOPROLOL TARTRATE 25 MG: 25 TABLET, FILM COATED ORAL at 22:11

## 2020-03-10 RX ADMIN — HYDRALAZINE HYDROCHLORIDE 10 MG: 20 INJECTION INTRAMUSCULAR; INTRAVENOUS at 01:07

## 2020-03-10 RX ADMIN — DIBASIC SODIUM PHOSPHATE, MONOBASIC POTASSIUM PHOSPHATE AND MONOBASIC SODIUM PHOSPHATE 2 TABLET: 852; 155; 130 TABLET ORAL at 23:44

## 2020-03-10 RX ADMIN — HEPARIN SODIUM 5000 UNITS: 5000 INJECTION INTRAVENOUS; SUBCUTANEOUS at 22:11

## 2020-03-10 RX ADMIN — LISINOPRIL 2.5 MG: 5 TABLET ORAL at 17:15

## 2020-03-10 RX ADMIN — INSULIN GLARGINE 21 UNITS: 100 INJECTION, SOLUTION SUBCUTANEOUS at 09:19

## 2020-03-10 RX ADMIN — METOPROLOL TARTRATE 25 MG: 25 TABLET, FILM COATED ORAL at 09:13

## 2020-03-10 NOTE — ROUTINE PROCESS
General Surgery End of Shift Nursing Note    Bedside shift change report given to Birgit Alexander RN  (oncoming nurse) by [address] Johanny RN (offgoing nurse). Report included the following information SBAR, Kardex, Intake/Output and MAR. Shift worked:   C   Summary of shift:    Pt's BG still quite elevated today. Pt's IVF discontinued. Pt up ad leeanne. Bp is better. Issues for physician to address:        Number times ambulated in hallway past shift: 5    Number of times OOB to chair past shift: 5    Pain Management:  Current medication:   Patient states pain is manageable on current pain medication: YES    GI:    Current diet:  DIET DIABETIC CONSISTENT CARB Regular    Tolerating current diet: YES  Passing flatus: YES  Last Bowel Movement: yesterday   Appearance:     Respiratory:    Incentive Spirometer at bedside: YES  Patient instructed on use: NO    Patient Safety:    Bed Alarm On? No  Sitter?  No    Madi Piper RN

## 2020-03-10 NOTE — PROGRESS NOTES
General Surgery End of Shift Nursing Note    Bedside shift change report given to [address] (oncoming nurse) by Jaguar Mendoza (offgoing nurse). Report included the following information SBAR, Kardex, Intake/Output and MAR. Shift worked:   7p   Summary of shift:  Patient noted resting no distress at this time. One complaint of nausea and resolved. Possible D/C to home. BS has decreased.     Issues for physician to address: None       Haydee Sarkar

## 2020-03-10 NOTE — PROGRESS NOTES
Hospitalist Progress Note    NAME: [facility]   :  1980   MRN:  [id number]       Assessment / Plan:  Type 1 DM, uncontrolled in DKA  Profound metabolic acidosis (pH 3.79) on admission, improved  FELECIA, improving  Volume depletion  Hyponatremia, improving  Hyperkalemia, resolved  Hypothermia (90.9F), resolved  -multiple admissions for DKA due to inability to afford meds/lack of health insurance. He now has medicaid. -A1C 13.9, BG remains elevated, high risk for going back into DKA   -change lantus to 21 units daily (0.3Uk/kg), titrate prn  -SSI  -Monitor electrolytes and renal function  -stop IVF, advance diet  -PRN Antiemetics  -will need referral to endocrinology for better BG management outpt     HTN  -BP running high here. He has noticed elevated BP for sometime now  -stop IVF  -add metoprolol, low dose lisinopril, titrate prn  -IV hydralazine prn    Hx Depression / Bipolar   Hx Seizures     Code Status: Full  Surrogate Decision Maker:   DVT Prophylaxis:   SCD  GI Prophylaxis: not indicated     Subjective:     Chief Complaint / Reason for Physician Visit  Pt seen, BG and BP elevated. Denies any complaint. Discussed with RN events overnight. Review of Systems:  Symptom Y/N Comments  Symptom Y/N Comments   Fever/Chills    Chest Pain     Poor Appetite y   Edema     Cough    Abdominal Pain y    Sputum    Joint Pain     SOB/JOHNSTON    Pruritis/Rash     Nausea/vomit y   Tolerating PT/OT     Diarrhea    Tolerating Diet     Constipation    Other       Could NOT obtain due to:      Objective:     VITALS:   Last 24hrs VS reviewed since prior progress note.  Most recent are:  Patient Vitals for the past 24 hrs:   Temp Pulse Resp BP SpO2   03/10/20 0754 97.8 °F (36.6 °C) 100 16 (!) 157/99 98 %   03/10/20 0702 -- -- -- (!) 159/96 --   03/10/20 0108 98.5 °F (36.9 °C) 90 16 (!) 151/94 96 %   20 2118 98.5 °F (36.9 °C) 93 16 (!) 176/100 96 %   20 -- (!) 112 18 (!) 147/92 --   20 1800 98.3 °F (36.8 °C) 99 22 (!) 194/110 100 %   03/09/20 1755 -- 100 13 (!) 180/99 --   03/09/20 1752 98 °F (36.7 °C) 98 18 (!) 179/95 98 %   03/09/20 1600 -- (!) 102 14 -- --   03/09/20 1500 -- 96 16 -- --   03/09/20 1400 -- 92 21 -- --   03/09/20 1300 -- 97 17 (!) 178/97 --   03/09/20 1200 98.3 °F (36.8 °C) 91 16 (!) 163/92 100 %   03/09/20 1100 -- 89 -- (!) 160/93 100 %   03/09/20 1000 -- 100 15 (!) 168/93 96 %       Intake/Output Summary (Last 24 hours) at 3/10/2020 0912  Last data filed at 3/10/2020 0755  Gross per 24 hour   Intake 1725.74 ml   Output 2855 ml   Net -1129.26 ml        PHYSICAL EXAM:  General: WD, WN. Alert, cooperative, no acute distress    EENT:  EOMI. Anicteric sclerae. MMM  Resp:  CTA bilaterally, no wheezing or rales. No accessory muscle use  CV:  Regular  rhythm,  No edema  GI:  Soft, Non distended, Non tender.  +Bowel sounds  Neurologic:  Alert and oriented X 3, normal speech,   Psych:   Good insight. Not anxious nor agitated  Skin:  No rashes. No jaundice    Reviewed most current lab test results and cultures  YES  Reviewed most current radiology test results   YES  Review and summation of old records today    NO  Reviewed patient's current orders and MAR    YES  PMH/SH reviewed - no change compared to H&P  ________________________________________________________________________  Care Plan discussed with:    Comments   Patient x    Family      RN x    Care Manager     Consultant                        Multidiciplinary team rounds were held today with , nursing, pharmacist and clinical coordinator. Patient's plan of care was discussed; medications were reviewed and discharge planning was addressed.      ________________________________________________________________________  Total NON critical care TIME:    35 Minutes    Total CRITICAL CARE TIME Spent:   Minutes non procedure based      Comments   >50% of visit spent in counseling and coordination of care ________________________________________________________________________  Janna Louis MD     Procedures: see electronic medical records for all procedures/Xrays and details which were not copied into this note but were reviewed prior to creation of Plan. LABS:  I reviewed today's most current labs and imaging studies. Pertinent labs include:  Recent Labs     03/09/20  0506 03/08/20  0407 03/07/20  1329   WBC 9.6 12.2* 11.9*   HGB 9.3* 10.8* 14.4   HCT 27.0* 29.9* 45.5    358 388     Recent Labs     03/10/20  0412 03/09/20  0919 03/09/20  0506  03/07/20  1423   * 135* 136   < > 120*   K 3.8 3.6 3.7   < > 6.1*    104 107   < > 81*   CO2 23 29 27   < > <5*   * 221* 184*   < > 972*   BUN 8 8 13   < > 42*   CREA 0.60* 0.84 0.93   < > 1.64*   CA 7.5* 7.7* 7.5*   < > 8.3*   MG 2.0 2.0 2.0   < >  --    PHOS 2.1* 1.5* 1.4*   < >  --    ALB  --   --   --   --  2.2*   TBILI  --   --   --   --  0.6   SGOT  --   --   --   --  17   ALT  --   --   --   --  31    < > = values in this interval not displayed.        Signed: Janna Louis MD

## 2020-03-10 NOTE — PROGRESS NOTES
46- Spoke with Dr. Marii Garcia via telephone regarding elevated BP; systolic currently [phone].  Verbal orders as follows: Decrease IVF to 50ml/hr; Hydralazine 10mg IV Q6hr scheduled~hold for systolic <117

## 2020-03-11 VITALS
BODY MASS INDEX: 22.99 KG/M2 | DIASTOLIC BLOOD PRESSURE: 81 MMHG | WEIGHT: 155.2 LBS | SYSTOLIC BLOOD PRESSURE: 140 MMHG | OXYGEN SATURATION: 97 % | HEART RATE: 88 BPM | HEIGHT: 69 IN | TEMPERATURE: 98.1 F | RESPIRATION RATE: 18 BRPM

## 2020-03-11 LAB
GLUCOSE BLD STRIP.AUTO-MCNC: 218 MG/DL (ref 65–100)
GLUCOSE BLD STRIP.AUTO-MCNC: 245 MG/DL (ref 65–100)
SERVICE CMNT-IMP: ABNORMAL
SERVICE CMNT-IMP: ABNORMAL

## 2020-03-11 PROCEDURE — 74011250636 HC RX REV CODE- 250/636: Performed by: INTERNAL MEDICINE

## 2020-03-11 PROCEDURE — 74011636637 HC RX REV CODE- 636/637: Performed by: INTERNAL MEDICINE

## 2020-03-11 PROCEDURE — 74011250637 HC RX REV CODE- 250/637: Performed by: INTERNAL MEDICINE

## 2020-03-11 PROCEDURE — 74011250636 HC RX REV CODE- 250/636: Performed by: FAMILY MEDICINE

## 2020-03-11 PROCEDURE — 74011636637 HC RX REV CODE- 636/637: Performed by: FAMILY MEDICINE

## 2020-03-11 PROCEDURE — 82962 GLUCOSE BLOOD TEST: CPT

## 2020-03-11 RX ORDER — ACETAMINOPHEN 325 MG/1
650 TABLET ORAL
Status: DISCONTINUED | OUTPATIENT
Start: 2020-03-11 | End: 2020-03-11 | Stop reason: HOSPADM

## 2020-03-11 RX ORDER — LISINOPRIL 5 MG/1
5 TABLET ORAL DAILY
Status: DISCONTINUED | OUTPATIENT
Start: 2020-03-12 | End: 2020-03-11 | Stop reason: HOSPADM

## 2020-03-11 RX ORDER — METOPROLOL TARTRATE 25 MG/1
25 TABLET, FILM COATED ORAL EVERY 12 HOURS
Qty: 60 TAB | Refills: 0 | Status: SHIPPED | OUTPATIENT
Start: 2020-03-11 | End: 2020-06-03 | Stop reason: SDUPTHER

## 2020-03-11 RX ORDER — INSULIN GLARGINE 100 [IU]/ML
23 INJECTION, SOLUTION SUBCUTANEOUS DAILY
Qty: 1 ADJUSTABLE DOSE PRE-FILLED PEN SYRINGE | Refills: 1 | Status: SHIPPED | OUTPATIENT
Start: 2020-03-11 | End: 2020-03-25 | Stop reason: SDUPTHER

## 2020-03-11 RX ORDER — LISINOPRIL 5 MG/1
5 TABLET ORAL DAILY
Qty: 30 TAB | Refills: 0 | Status: SHIPPED | OUTPATIENT
Start: 2020-03-12 | End: 2020-06-03 | Stop reason: SDUPTHER

## 2020-03-11 RX ADMIN — LISINOPRIL 2.5 MG: 5 TABLET ORAL at 08:33

## 2020-03-11 RX ADMIN — INSULIN LISPRO 3 UNITS: 100 INJECTION, SOLUTION INTRAVENOUS; SUBCUTANEOUS at 08:32

## 2020-03-11 RX ADMIN — PANTOPRAZOLE SODIUM 40 MG: 40 TABLET, DELAYED RELEASE ORAL at 06:56

## 2020-03-11 RX ADMIN — HYDRALAZINE HYDROCHLORIDE 10 MG: 20 INJECTION INTRAMUSCULAR; INTRAVENOUS at 07:02

## 2020-03-11 RX ADMIN — INSULIN GLARGINE 21 UNITS: 100 INJECTION, SOLUTION SUBCUTANEOUS at 08:32

## 2020-03-11 RX ADMIN — DIBASIC SODIUM PHOSPHATE, MONOBASIC POTASSIUM PHOSPHATE AND MONOBASIC SODIUM PHOSPHATE 2 TABLET: 852; 155; 130 TABLET ORAL at 08:32

## 2020-03-11 RX ADMIN — Medication 10 ML: at 06:56

## 2020-03-11 RX ADMIN — METOPROLOL TARTRATE 25 MG: 25 TABLET, FILM COATED ORAL at 08:33

## 2020-03-11 RX ADMIN — HEPARIN SODIUM 5000 UNITS: 5000 INJECTION INTRAVENOUS; SUBCUTANEOUS at 06:56

## 2020-03-11 NOTE — DISCHARGE SUMMARY
Discharge Summary      Name: Cristian Bowser  [id number]  YOB: 1980 (Age: 42 y.o.)   Date of Admission: 3/7/2020  Date of Discharge: 3/11/2020  Attending Physician: Rivas De Jesus MD    Discharge Diagnosis:   Type 1 DM, uncontrolled in DKA  Profound metabolic acidosis (pH 1.55) on admission  FELECIA  Volume depletion  Hyponatremia  Hyperkalemia,   Hypothermia   HTN   Hx Depression / Bipolar   Hx Seizures    Consultations:  IP CONSULT TO HOSPITALIST    Procedures:     Brief Admission History/Reason for Admission Per Liz Ruano MD:   Moose Ram is a 42 y.o.  male with a history of Type 1 DM who presents via EMS to the ER because of weakness and poor intake. He reports that his blood sugars have been okay at home and just didn't feel well. ER work up is remarkable for profound acidosis with a pH of 7.08, hyperglycemia blood sugar 972, FELECIA, lactic acidosis, hyperkalemia and hypothermia temperature of 90 F. We were asked to admit for work up and evaluation of the above problems. Brief Hospital Course by Main Problems:   Type 1 DM, uncontrolled in DKA  Profound metabolic acidosis (pH 1.71) on admission, improved  FELECIA, improving  Volume depletion  Hyponatremia, improving  Hyperkalemia, resolved  Hypothermia (90.9F), resolved  Pt with multiple admissions for DKA due to inability to afford meds/lack of health insurance. He now has medicaid. UA and CXR neg on admission. A1C 13.9. He was on insulin drip, transitioned to sub lantus when AG closed and bicarb improved. His BG is better controlled now on lantus. He is more likely to be compliance to meds if it is given once daily, thus the change to lantus daily. Will refer pt to endocrinology outpt for further management. He remains hemodynamically stable. Labs are improved. He tolerates po intake. Counseled on medication compliance.   Advised to keep BG log at home and bring to PCP/endocrinology for further titration of meds. HTN, newly diagnosed. Pt was started on metoprolol and lisinopril.       Hx Depression / Bipolar   Hx Seizures  Stable. Discharge Exam:  Patient seen and examined by me on discharge day. Pertinent Findings:  Visit Vitals  /81   Pulse 88   Temp 98.1 °F (36.7 °C)   Resp 18   Ht 5' 9\" (1.753 m)   Wt 70.4 kg (155 lb 3.3 oz)   SpO2 97%   BMI 22.92 kg/m²     Gen:    Not in distress  Chest: Clear lungs  CVS:   Regular rhythm. No edema  Abd:  Soft, not distended, not tender    Discharge/Recent Laboratory Results:  Recent Labs     03/10/20  0412   *   K 3.8      CO2 23   BUN 8   *   CA 7.5*   PHOS 2.1*   MG 2.0     Recent Labs     03/09/20  0506   HGB 9.3*   HCT 27.0*   WBC 9.6          Discharge Medications:  Current Discharge Medication List      START taking these medications    Details   lisinopriL (PRINIVIL, ZESTRIL) 5 mg tablet Take 1 Tab by mouth daily. Qty: 30 Tab, Refills: 0      insulin glargine (Lantus Solostar U-100 Insulin) 100 unit/mL (3 mL) inpn 23 Units by SubCUTAneous route daily. Qty: 1 Adjustable Dose Pre-filled Pen Syringe, Refills: 1      metoprolol tartrate (LOPRESSOR) 25 mg tablet Take 1 Tab by mouth every twelve (12) hours. Qty: 60 Tab, Refills: 0         CONTINUE these medications which have NOT CHANGED    Details   pantoprazole (PROTONIX) 40 mg tablet Take 1 Tab by mouth two (2) times a day. Qty: 60 Tab, Refills: 0      tamsulosin (FLOMAX) 0.4 mg capsule Take 1 Cap by mouth daily. Qty: 30 Cap, Refills: 1      insulin regular (NOVOLIN R) 100 unit/mL injection 2-10 Units by SubCUTAneous route Before breakfast, lunch, and dinner.  Blood Glucose (mg/dL)       Insulin Dose (units)              150-200                               2               201-250                               4               251-300                               6               301-350                               8               >351 10         STOP taking these medications       insulin NPH/insulin regular (NOVOLIN 70/30 U-100 INSULIN) 100 unit/mL (70-30) injection Comments:   Reason for Stopping:               Patient Follow Up Instructions: Activity: Activity as tolerated  Diet: Diabetic Diet  Wound Care: None needed  Code: Full    DISPOSITION:    Home with Family: x   Home with HH/PT/OT/RN:    SNF/LTC:    CATHIE:    OTHER:          Follow up with:   PCP : None  Follow-up Information     Follow up With Specialties Details Why Aj Carmichael MD Internal Medicine On 3/16/2020 Appoint time- 10:45am. Please arrive 30 minutes early to register as a new patient. Bring your picture ID, insurance card and list of medications. Please call the office at [phone] if you have any questions or concerns related to this appointment.  [address] Ellie Mallory MD Endocrinology On 3/25/2020 Appointment is 3/25/20 at 10am, Please call the office with any concerns at [address] [facility]  [phone]      None    None ([phone]) Patient stated that they have no PCP              Total time in minutes spent coordinating this discharge (includes going over instructions, follow-up, prescriptions, and preparing report for sign off to her PCP) :  35 minutes

## 2020-03-11 NOTE — PROGRESS NOTES
RAPID RESPONSE TEAM- Follow Up    Rounded on patient due to recent transfer out of CCU. Discussed with primary RN, Iraida Edwards. No acute concerns, VSS, MEWS 1. Patient alert, in bed. Plan to go home soon. Patient Vitals for the past 12 hrs:   Temp Pulse Resp BP SpO2   03/10/20 2026 98.6 °F (37 °C) 97 16 134/90 100 %   03/10/20 1522 98.7 °F (37.1 °C) 100 20 125/73 97 %   03/10/20 1114 98.7 °F (37.1 °C) 92 18 (!) 157/97 98 %       No RRT interventions indicated at this time. Please call with any questions or concerns.      Demetri Bhagat  Rapid Response ASAD Acuna

## 2020-03-11 NOTE — PROGRESS NOTES
General Surgery End of Shift Nursing Note     Bedside shift change report given to Anila (oncoming nurse) by Antoinette Jansen (offgoing nurse). Report included the following information SBAR, Kardex, Intake/Output and MAR.     Shift worked:   7p   Summary of shift:  Patient noted resting no distress at this time. Candance Huger Possible D/C to home. BS was elevated, as well as bp. Still needs control.    Issues for physician to address: None         Haydee Sarkar

## 2020-03-11 NOTE — PROGRESS NOTES
MELL Plan:    1- D/C Home - friends to provide transportation. 2- F/U appointments made with Dr. Falguni Verduzco as new PCP for 3/16/20 at 10:45 and Dr. Jesus Quintanilla- Endocrinologist- for 3/25/20 at 10Am. 3- Pt instructed to bring picture Id, insurance card and list of current medications to new PCP appointment. From CM standpoint, pt  is cleared for discharge. Care Management Interventions  PCP Verified by CM: Yes  Mode of Transport at Discharge: Other (see comment)(Freinds to provide )  Hospital Transport Time of Discharge: 1100  Discharge Durable Medical Equipment: No  Physical Therapy Consult: No  Occupational Therapy Consult: No  Speech Therapy Consult: No  Current Support Network: Other(Living with friends. Independent with ADL's . No DME.  He drives. )  Confirm Follow Up Transport: Family  Discharge Location  Discharge Placement: [facility]  Phone: [phone]

## 2020-03-11 NOTE — DISCHARGE INSTRUCTIONS
Patient Education        Diabetic Ketoacidosis (DKA): Care Instructions  Your Care Instructions  Diabetic ketoacidosis (DKA) happens when the body does not have enough insulin and can't get the sugar it needs for energy. When the body can't use sugar for energy, it starts to use fat for energy. This process makes fatty acids called ketones. The ketones build up in the blood and change the chemical balance in your body. This problem can be very dangerous and needs to be treated. Without treatment, it can lead to a coma or death. DKA occurs most often in people with type 1 diabetes. But people with type 2 diabetes also can get it. DKA can be caused by many things. It can happen if you don't take enough insulin. It can also happen if you have an infection or illness like the flu. Sometimes it happens if you are very dehydrated. DKA can only be treated with insulin and fluids. These are often given in a vein (IV). Follow-up care is a key part of your treatment and safety. Be sure to make and go to all appointments, and call your doctor if you are having problems. It's also a good idea to know your test results and keep a list of the medicines you take. How can you care for yourself at home? To reduce your chance of ketoacidosis:  · Take your insulin and other diabetes medicines on time and in the right dose. ? If an infection caused your DKA and your doctor prescribed antibiotics, take them as directed. Do not stop taking them just because you feel better. You need to take the full course of antibiotics. · Test your blood sugar before meals and at bedtime or as often as your doctor advises. This is the best way to know when your blood sugar is high so you can treat it early. Watching for symptoms is not as helpful. This is because you may not have symptoms until your blood sugar is very high. Or you may not notice them. · Teach others at work and at home how to check your blood sugar.  Make sure that someone else knows how do it in case you can't. · Wear or carry medical identification at all times. This is very important in case you are too sick or injured to speak for yourself. · Talk to your doctor about when you can start to exercise again. · Eat regular meals that spread your calories and carbohydrate throughout the day. This will help keep your blood sugar steady. · When you are sick:  ? Take your insulin and diabetes medicines. This is important even if you are vomiting and having trouble eating or drinking. Your blood sugar may go up because you are sick. If you are eating less than normal, you may need to change your dose of insulin. Talk with your doctor about a plan when you are well. Then you will know what to do when you are sick. ? Drink extra fluids to prevent dehydration. These include water, broth, and sugar-free drinks. If you don't drink enough, the insulin from your shot may not get into your blood. So your blood sugar may go up. ? Try to eat as you normally do, with a focus on healthy food choices. ? Check your blood sugar at least every 3 to 4 hours. Check it more often if it's rising fast. If your doctor has told you to take an extra insulin dose for high blood sugar levels (for example, above 240 mg/dL) be sure to take the right amount. If you're not sure how much to take, call your doctor. ? Check your temperature and pulse often. If your temperature goes up, call your doctor. You may be getting worse. ? If you take insulin, check your urine or blood for ketones, especially when you have high blood sugar (for example, above 240 mg/dL). Call your doctor if your ketone level is moderate or high. If you know your blood sugar is high, treat it before it gets worse. · If you missed your usual dose of insulin or other diabetes medicine, take the missed dose or take the amount your doctor told you to take if this happens.   · If you and your doctor decide on a dose of extra-fast-acting insulin, give yourself the right dose. If you take insulin and your doctor has not told you how much fast-acting insulin to take based on your blood sugar level, call your doctor. · Drink extra water or sugar-free drinks to prevent dehydration. · Wait 30 minutes after you take extra insulin or missed medicines. Then check your blood sugar again. · If symptoms of high blood sugar get worse or your blood sugar level keeps rising, call your doctor. If you start to feel sleepy or confused, call 911. When should you call for help? Call 911 anytime you think you may need emergency care. For example, call if:    · You passed out (lost consciousness).     · You are confused or cannot think clearly.     · Your blood sugar is very high or very low.    Watch closely for changes in your health, and be sure to contact your doctor if:    · Your blood sugar stays outside the level your doctor set for you.     · You have any problems. Where can you learn more? Go to http://dwaine-sanjeev.info/. Alicia Warner in the search box to learn more about \"Diabetic Ketoacidosis (DKA): Care Instructions. \"  Current as of: April 16, 2019  Content Version: 12.2  © 8823-9797 [employer]. Care instructions adapted under license by [employer] (which disclaims liability or warranty for this information). If you have questions about a medical condition or this instruction, always ask your healthcare professional. [place] [address] any warranty or liability for your use of this information.

## 2020-03-12 LAB
BACTERIA SPEC CULT: NORMAL
SERVICE CMNT-IMP: NORMAL

## 2020-03-16 ENCOUNTER — OFFICE VISIT (OUTPATIENT)
Dept: PRIMARY CARE CLINIC | Age: 40
End: 2020-03-16

## 2020-03-16 VITALS
BODY MASS INDEX: 20.97 KG/M2 | HEART RATE: 98 BPM | WEIGHT: 141.6 LBS | OXYGEN SATURATION: 100 % | RESPIRATION RATE: 18 BRPM | SYSTOLIC BLOOD PRESSURE: 145 MMHG | TEMPERATURE: 98.1 F | HEIGHT: 69 IN | DIASTOLIC BLOOD PRESSURE: 90 MMHG

## 2020-03-16 DIAGNOSIS — K52.9 COLITIS: ICD-10-CM

## 2020-03-16 DIAGNOSIS — Z12.5 PROSTATE CANCER SCREENING: ICD-10-CM

## 2020-03-16 DIAGNOSIS — E55.9 VITAMIN D DEFICIENCY: ICD-10-CM

## 2020-03-16 DIAGNOSIS — E83.51 HYPOCALCEMIA: ICD-10-CM

## 2020-03-16 DIAGNOSIS — N13.30 HYDRONEPHROSIS OF RIGHT KIDNEY: ICD-10-CM

## 2020-03-16 DIAGNOSIS — R74.8 ELEVATED ALKALINE PHOSPHATASE LEVEL: ICD-10-CM

## 2020-03-16 DIAGNOSIS — E11.9 DM TYPE 2, GOAL HBA1C < 7% (HCC): Primary | ICD-10-CM

## 2020-03-16 DIAGNOSIS — M25.571 ACUTE BILATERAL ANKLE PAIN: ICD-10-CM

## 2020-03-16 DIAGNOSIS — M25.572 ACUTE BILATERAL ANKLE PAIN: ICD-10-CM

## 2020-03-16 DIAGNOSIS — K31.84 GASTROPARESIS DUE TO DM (HCC): ICD-10-CM

## 2020-03-16 DIAGNOSIS — E11.43 GASTROPARESIS DUE TO DM (HCC): ICD-10-CM

## 2020-03-16 DIAGNOSIS — N32.89 BLADDER WALL THICKENING: ICD-10-CM

## 2020-03-16 DIAGNOSIS — M79.89 LEG SWELLING: ICD-10-CM

## 2020-03-16 LAB — GLUCOSE POC: 317 MG/DL

## 2020-03-16 NOTE — H&P
Norah Saab is a 42 y.o.  male and presents with     Chief Complaint   Patient presents with   [address] [place]    Foot Swelling     bilateral foot swelling with pain      Patient is here to establish care. Patient was recently discharged from the hospital after being treated for DKA. Patient had at least 4 admissions in the last 4 months for uncontrolled diabetes or DKA. Patient also has nausea. He had endoscopy done couple of years back that was unremarkable. He says he thinks he has a history of ulcers. He was also felt to have diabetic gastroparesis by GI while he was in the hospital.  He did not have insurance so he could not take his medications or see specialist.  Prior to this admission he was taking Novolin 70/30 23 units twice a day. During most recent hospitalization he was put on Lantus as well as sliding scale 3 times a day. He says his blood sugars have improved however he did not take his insulin this morning. He does have swelling in his ankles and mild pain. During hospital stays he had CT abdomen that revealed thickened urinary bladder as well as mention of colitis on the CT scan reports. Also a CT scan done in January mention about right renal hydronephrosis. He was placed on Flomax while he was in the hospital thinking that he could have enlarged prostate. 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 He does smoke occasional marijuana does not drink alcohol. During hospital stay labs revealed low calcium levels as well as elevated alkaline phosphatase levels.           Past Medical History:   Diagnosis Date    [facility]     Bipolar disorder [facility] [facility]     Depression     Diabetes ([facility] [facility]     DKA, type 1 ([facility] [address] 1/27/2013    diagnosed age 23    H/O noncompliance with medical treatment, presenting hazards to health     MRSA (methicillin resistant staph aureus) culture positive     MRSA (methicillin resistant Staphylococcus aureus)     Face    Noncompliance with medication regimen     Second hand smoke exposure     Seizure ([facility] [address] [facility]     Seizures ([facility] [address] 2006 or 2007    one episode during long-term     Past Surgical History:   Procedure Laterality Date    HX HEENT      top left wisdom tooth    HX ORTHOPAEDIC Left     wrist; MCV    UPPER GI ENDOSCOPY,BIOPSY  11/20/2018          Current Outpatient Medications   Medication Sig    lisinopriL (PRINIVIL, ZESTRIL) 5 mg tablet Take 1 Tab by mouth daily.  insulin glargine (Lantus Solostar U-100 Insulin) 100 unit/mL (3 mL) inpn 23 Units by SubCUTAneous route daily.  metoprolol tartrate (LOPRESSOR) 25 mg tablet Take 1 Tab by mouth every twelve (12) hours.  pantoprazole (PROTONIX) 40 mg tablet Take 1 Tab by mouth two (2) times a day.  tamsulosin (FLOMAX) 0.4 mg capsule Take 1 Cap by mouth daily.  insulin regular (NOVOLIN R) 100 unit/mL injection 2-10 Units by SubCUTAneous route Before breakfast, lunch, and dinner. Blood Glucose (mg/dL)       Insulin Dose (units)              150-200                               2               201-250                               4               251-300                               6               301-350                               8               >351                                   10     No current facility-administered medications for this visit.       Health Maintenance   Topic Date Due    Eye Exam Retinal or Dilated  11/22/1992    Pneumococcal 0-64 years (1 of 1 - PPSV23) 10/13/2011    MICROALBUMIN Q1  11/05/2015    Lipid Screen  03/22/2018    Influenza Age 9 to Adult  12/14/2021 (Originally 8/1/2019)    A1C test (Diabetic or Prediabetic)  06/09/2020    Foot Exam Q1  03/07/2021    DTaP/Tdap/Td series (2 - Td) 03/09/2028     Immunization History   Administered Date(s) Administered    (RETIRED) Pneumococcal Vaccine (Unspecified Type) 08/18/2011    TD Vaccine 03/08/2011    Tdap 03/09/2018     No LMP for male patient. Allergies and Intolerances:   No Known Allergies    Family History:   Family History   Problem Relation Age of Onset    Diabetes Mother     Diabetes Other         neice, type 1        Social History:   He  reports that he has been smoking cigarettes. He has a 1.60 pack-year smoking history. He has never used smokeless tobacco.  He  reports no history of alcohol use. Review of Systems:   General: negative for - chills, fatigue, fever, weight change  Psych: negative for - anxiety, depression, irritability or mood swings  ENT: negative for - headaches, hearing change, nasal congestion, oral lesions, sneezing or sore throat  Heme/ Lymph: negative for - bleeding problems, bruising, pallor or swollen lymph nodes  Endo: negative for - hot flashes, polydipsia/polyuria or temperature intolerance  Resp: negative for - cough, shortness of breath or wheezing  CV: negative for - chest pain, edema or palpitations  GI: negative for - abdominal pain, change in bowel habits, constipation, diarrhea or nausea/vomiting  : negative for - dysuria, hematuria, incontinence, pelvic pain or vulvar/vaginal symptoms  MSK: negative for - joint pain, joint swelling or muscle pain  Neuro: negative for - confusion, headaches, seizures or weakness  Derm: negative for - dry skin, hair changes, rash or skin lesion changes          Physical:   Vitals:   Vitals:    03/16/20 1054   BP: 145/90   Pulse: 98   Resp: 18   Temp: 98.1 °F (36.7 °C)   TempSrc: Oral   SpO2: 100%   Weight: 141 lb 9.6 oz (64.2 kg)   Height: 5' 9\" (1.753 m)           Exam:   HEENT- atraumatic,normocephalic, awake, oriented, well nourished  Neck - supple,no enlarged lymph nodes, no JVD, no thyromegaly  Chest- CTA, no rhonchi, no crackles  Heart- rrr, no murmurs / gallop/rub  Abdomen- soft,BS+,NT, no hepatosplenomegaly  Ext -bilateral +2 to +3 pitting edema in both lower extremities below the knee.   Neuro- no focal deficits. Power 5/5 all extremities  Skin - warm,dry, no obvious rashes. Review of Data:   LABS:   Lab Results   Component Value Date/Time    WBC 9.6 03/09/2020 05:06 AM    HGB 9.3 (L) 03/09/2020 05:06 AM    HCT 27.0 (L) 03/09/2020 05:06 AM    PLATELET 997 87/38/3014 05:06 AM     Lab Results   Component Value Date/Time    Sodium 133 (L) 03/10/2020 04:12 AM    Potassium 3.8 03/10/2020 04:12 AM    Chloride 100 03/10/2020 04:12 AM    CO2 23 03/10/2020 04:12 AM    Glucose 240 (H) 03/10/2020 04:12 AM    BUN 8 03/10/2020 04:12 AM    Creatinine 0.60 (L) 03/10/2020 04:12 AM     Lab Results   Component Value Date/Time    Cholesterol, total 222 (H) 03/22/2017 07:20 PM    HDL Cholesterol 80 03/22/2017 07:20 PM    LDL, calculated 110.4 (H) 03/22/2017 07:20 PM    Triglyceride 158 (H) 03/22/2017 07:20 PM     No results found for: GPT        Impression / Plan:        ICD-10-CM ICD-9-CM    1. DM type 2, goal HbA1c < 7% ([facility]) E11.9 250.00 AMB POC GLUCOSE BLOOD, BY GLUCOSE MONITORING DEVICE      REFERRAL TO ENDOCRINOLOGY   2. Colitis K52.9 558.9 REFERRAL TO GASTROENTEROLOGY   3. Leg swelling M79.89 729.81 MICROALBUMIN, UR, RAND W/ MICROALB/CREAT RATIO      MICROALBUMIN, UR, RAND W/ MICROALB/CREAT RATIO   4. Gastroparesis due to DM ([facility]) E11.43 250.60 NM GASTRIC EMPTY STDY    K31.84 536.3 REFERRAL TO ENDOCRINOLOGY      REFERRAL TO GASTROENTEROLOGY   5. Elevated alkaline phosphatase level R74.8 790.5 REFERRAL TO ENDOCRINOLOGY      REFERRAL TO GASTROENTEROLOGY      ALK PHOS ISOENZYMES   6. Hypocalcemia E83.51 275.41 REFERRAL TO ENDOCRINOLOGY      CALCIUM, IONIZED      PTH INTACT   7. Bladder wall thickening N32.89 596.89 REFERRAL TO UROLOGY   8. Hydronephrosis of right kidney N13.30 591 REFERRAL TO UROLOGY   9. Prostate cancer screening Z12.5 V76.44 PSA, DIAGNOSTIC (PROSTATE SPECIFIC AG)     Patient has appointment with endocrinology in about 10 days.   Patient will need to see urology, GI and also may need to see nephrology for possible  proteinuria related to diabetes. He is to control the diabetes and get A1c under control. He may also need to see an ophthalmologist.  He declined pneumococcal vaccine today. Asked patient to monitor blood sugars 3 times daily-before breakfast, before lunch and before dinner and bring the, log next visit. Explained to patient risk benefits of the medications. Advised patient to stop meds if having any side effects. Pt verbalized understanding of the instructions. I have discussed the diagnosis with the patient and the intended plan as seen in the above orders. The patient has received an after-visit summary and questions were answered concerning future plans. I have discussed medication side effects and warnings with the patient as well. I have reviewed the plan of care with the patient, accepted their input and they are in agreement with the treatment goals. Reviewed plan of care. Patient has provided input and agrees with goals.         Ashlee Ponce MD

## 2020-03-16 NOTE — PROGRESS NOTES
Chief Complaint   Patient presents with   [facility] Follow Up     [facility]    Foot Swelling     bilateral foot swelling with pain        1. Have you been to the ER, urgent care clinic since your last visit? Hospitalized since your last visit? Yes When: 03/2020 Where: [facility]  Reason for visit: DM     2. Have you seen or consulted any other health care providers outside of the [address] [place] [address] since your last visit? Include any pap smears or colon screening.  No

## 2020-03-18 LAB
25(OH)D3+25(OH)D2 SERPL-MCNC: 9.6 NG/ML (ref 30–100)
ALBUMIN/CREAT UR: 3885 MG/G CREAT (ref 0–29)
ALP BONE CFR SERPL: 21 % (ref 12–68)
ALP INTEST CFR SERPL: 0 % (ref 0–18)
ALP LIVER CFR SERPL: 79 % (ref 13–88)
ALP SERPL-CCNC: 192 IU/L (ref 39–117)
CA-I SERPL ISE-MCNC: 4.9 MG/DL (ref 4.5–5.6)
CHOLEST SERPL-MCNC: 316 MG/DL (ref 100–199)
CREAT UR-MCNC: 46.3 MG/DL
HDLC SERPL-MCNC: 49 MG/DL
LDLC SERPL CALC-MCNC: ABNORMAL MG/DL (ref 0–99)
MICROALBUMIN UR-MCNC: 1798.8 UG/ML
PSA SERPL-MCNC: 0.2 NG/ML (ref 0–4)
PTH-INTACT SERPL-MCNC: 23 PG/ML (ref 15–65)
TRIGL SERPL-MCNC: 465 MG/DL (ref 0–149)
URATE SERPL-MCNC: 3.6 MG/DL (ref 3.7–8.6)
VLDLC SERPL CALC-MCNC: ABNORMAL MG/DL (ref 5–40)

## 2020-03-24 ENCOUNTER — TELEPHONE (OUTPATIENT)
Dept: PRIMARY CARE CLINIC | Age: 40
End: 2020-03-24

## 2020-03-24 DIAGNOSIS — E55.9 VITAMIN D DEFICIENCY: Primary | ICD-10-CM

## 2020-03-24 DIAGNOSIS — E78.5 HYPERLIPIDEMIA, UNSPECIFIED HYPERLIPIDEMIA TYPE: ICD-10-CM

## 2020-03-24 DIAGNOSIS — R80.9 PROTEINURIA, UNSPECIFIED TYPE: ICD-10-CM

## 2020-03-24 RX ORDER — GEMFIBROZIL 600 MG/1
600 TABLET, FILM COATED ORAL 2 TIMES DAILY
Qty: 60 TAB | Refills: 3 | Status: SHIPPED | OUTPATIENT
Start: 2020-03-24 | End: 2020-10-20

## 2020-03-24 RX ORDER — ERGOCALCIFEROL 1.25 MG/1
50000 CAPSULE ORAL
Qty: 4 CAP | Refills: 3 | Status: SHIPPED | OUTPATIENT
Start: 2020-03-24 | End: 2020-06-03 | Stop reason: SDUPTHER

## 2020-03-24 NOTE — TELEPHONE ENCOUNTER
----- Message from Heather Berry MD sent at 3/24/2020 10:48 AM EDT -----  Labs reveal - proteinuria. I have ordered 24 hour urine protein . Pt can get it done in the mext month or so. Lipids are high and vitamin D low. I have sent lopid and vitamin D supplement.

## 2020-03-24 NOTE — PROGRESS NOTES
Labs reveal - proteinuria. I have ordered 24 hour urine protein . Pt can get it done in the mext month or so. Lipids are high and vitamin D low. I have sent lopid and vitamin D supplement.

## 2020-03-24 NOTE — TELEPHONE ENCOUNTER
Message has been conveyed to patient as per Dr. Sohail De La Torre. Patient has verbalized understanding and no further questions were asked.

## 2020-03-25 ENCOUNTER — TELEPHONE (OUTPATIENT)
Dept: PRIMARY CARE CLINIC | Age: 40
End: 2020-03-25

## 2020-03-25 ENCOUNTER — OFFICE VISIT (OUTPATIENT)
Dept: ENDOCRINOLOGY | Age: 40
End: 2020-03-25

## 2020-03-25 VITALS
WEIGHT: 142 LBS | BODY MASS INDEX: 21.03 KG/M2 | SYSTOLIC BLOOD PRESSURE: 121 MMHG | HEART RATE: 80 BPM | DIASTOLIC BLOOD PRESSURE: 89 MMHG | HEIGHT: 69 IN

## 2020-03-25 DIAGNOSIS — E10.9 TYPE 1 DIABETES MELLITUS WITHOUT COMPLICATION (HCC): Primary | ICD-10-CM

## 2020-03-25 RX ORDER — FLASH GLUCOSE SCANNING READER
EACH MISCELLANEOUS
Qty: 1 EACH | Refills: 0 | Status: SHIPPED | OUTPATIENT
Start: 2020-03-25 | End: 2020-08-04

## 2020-03-25 RX ORDER — INSULIN ASPART 100 [IU]/ML
INJECTION, SOLUTION INTRAVENOUS; SUBCUTANEOUS
Qty: 10 ADJUSTABLE DOSE PRE-FILLED PEN SYRINGE | Refills: 3 | Status: SHIPPED | OUTPATIENT
Start: 2020-03-25 | End: 2021-04-01 | Stop reason: SDUPTHER

## 2020-03-25 RX ORDER — INSULIN GLARGINE 100 [IU]/ML
23 INJECTION, SOLUTION SUBCUTANEOUS DAILY
Qty: 10 ADJUSTABLE DOSE PRE-FILLED PEN SYRINGE | Refills: 3 | Status: SHIPPED | OUTPATIENT
Start: 2020-03-25 | End: 2020-06-03 | Stop reason: SDUPTHER

## 2020-03-25 RX ORDER — PEN NEEDLE, DIABETIC 30 GX3/16"
NEEDLE, DISPOSABLE MISCELLANEOUS
Qty: 400 PEN NEEDLE | Refills: 3 | Status: SHIPPED | OUTPATIENT
Start: 2020-03-25 | End: 2020-08-04

## 2020-03-25 RX ORDER — CHOLECALCIFEROL (VITAMIN D3) 125 MCG
5000 CAPSULE ORAL DAILY
Qty: 200 CAP | Refills: 3 | Status: SHIPPED | OUTPATIENT
Start: 2020-03-25 | End: 2020-06-03

## 2020-03-25 RX ORDER — FLASH GLUCOSE SENSOR
KIT MISCELLANEOUS
Qty: 2 KIT | Refills: 7 | Status: SHIPPED | OUTPATIENT
Start: 2020-03-25 | End: 2020-08-04

## 2020-03-25 NOTE — PROGRESS NOTES
CONSULTATION REQUESTED BY: Daniel Dixon MD     REASON FOR CONSULT:  Uncontrolled type 1 diabetes    CHIEF COMPLAINT: Blood glucose is high    HISTORY OF PRESENT ILLNESS:   Meka Santos is a 42 y.o. male with a PMHx as noted below who was referred to our endocrinology clinic for evaluation of uncontrolled type 1 diabetes.     Diabetes History:  Review of records reveals diagnosis back in 2008,  Has since had numerous ED visits / Hospitalizations,  Last A1c prior to initial visit was 13.9% recently, as been 13-14% for at least the past year    Regimen at time of establishing care includes:  - Lantus 23 units once daily  - Novolon R: 2-10 Units per meal per SS              150-200                               2               201-250                               4               251-300                               6               301-350                               8               >351                                   10     Review of home glucose:  Has no meter, requesting new supplies / freestyle shailesh sensor  Previously required at least 4 glucose checks per day,  Hypoglycemia screen: once in a blue moon, pretty much >200 per him  Admits to symptoms being present when a low sugar occurs    Review of most recent diabetes-related labs:  Lab Results   Component Value Date    HBA1C 13.9 (H) 03/09/2020    HBA1C 13.7 (H) 01/03/2020    HBA1C 14.1 (H) 11/03/2019    CHOL 316 (H) 03/16/2020    LDLC Comment 03/16/2020    GFRAA >60 03/10/2020    GFRNA >60 03/10/2020    MCACR 3,885 (H) 03/16/2020    TSH 1.25 01/03/2020    VITD3 9.6 (L) 03/16/2020     Lab Key:  [id number] = IA-2 pancreatic islet cell autoantibody  CPEPL = C-peptide level  :EXT = External Lab  GADLT = COREY-65 autoantibody   INSUL = Insulin level  MCACR (or MALBEXT) = Urine Microalbumin (or External UM)  B12LT = B12 level    PAST MEDICAL/SURGICAL HISTORY:   Past Medical History:   Diagnosis Date    B[facility] disorder[facility] depressed [facility]     [facility]     Depression     Diabetes ([facility]     DKA, type 1 ([facility] 1/27/2013    diagnosed age 23    H/O noncompliance with medical treatment, presenting hazards to health     MRSA (methicillin resistant staph aureus) culture positive     MRSA (methicillin resistant Staphylococcus aureus)     Face    Noncompliance with medication regimen     Second hand smoke exposure     [facility]     Seizures ([facility] 2006 or 2007    one episode during jail     Past Surgical History:   Procedure Laterality Date    HX HEENT      top left wisdom tooth    HX ORTHOPAEDIC Left     wrist; [facility]    UPPER GI ENDOSCOPY,BIOPSY  11/20/2018            ALLERGIES:   No Known Allergies    MEDICATIONS ON ADMISSION:     Current Outpatient Medications:     ergocalciferol (ERGOCALCIFEROL) 1,250 mcg (50,000 unit) capsule, Take 1 Cap by mouth every seven (7) days. , Disp: 4 Cap, Rfl: 3    gemfibroziL (LOPID) 600 mg tablet, Take 1 Tab by mouth two (2) times a day., Disp: 60 Tab, Rfl: 3    lisinopriL (PRINIVIL, ZESTRIL) 5 mg tablet, Take 1 Tab by mouth daily. , Disp: 30 Tab, Rfl: 0    insulin glargine (Lantus Solostar U-100 Insulin) 100 unit/mL (3 mL) inpn, 23 Units by SubCUTAneous route daily. , Disp: 1 Adjustable Dose Pre-filled Pen Syringe, Rfl: 1    metoprolol tartrate (LOPRESSOR) 25 mg tablet, Take 1 Tab by mouth every twelve (12) hours. , Disp: 60 Tab, Rfl: 0    tamsulosin (FLOMAX) 0.4 mg capsule, Take 1 Cap by mouth daily. , Disp: 30 Cap, Rfl: 1    insulin regular (NOVOLIN R) 100 unit/mL injection, 2-10 Units by SubCUTAneous route Before breakfast, lunch, and dinner.  Blood Glucose (mg/dL)       Insulin Dose (units)             150-200                               2              201-250                               4              251-300                               6              301-350                               8              >351                                   10, Disp: , Rfl:     pantoprazole (PROTONIX) 40 mg tablet, Take 1 Tab by mouth two (2) times a day., Disp: 60 Tab, Rfl: 0    SOCIAL HISTORY:   Social History     Socioeconomic History    Marital status: SINGLE     Spouse name: Not on file    Number of children: Not on file    Years of education: Not on file    Highest education level: Not on file   Occupational History    Not on file   Social Needs    Financial resource strain: Not on file    Food insecurity     Worry: Not on file     Inability: Not on file    Transportation needs     Medical: Not on file     Non-medical: Not on file   Tobacco Use    Smoking status: Current Some Day Smoker     Packs/day: 0.10     Years: 16.00     Pack years: 1.60     Types: Cigarettes    Smokeless tobacco: Never Used   Substance and Sexual Activity    Alcohol use: No    Drug use: No    Sexual activity: Not on file   Lifestyle    Physical activity     Days per week: Not on file     Minutes per session: Not on file    Stress: Not on file   Relationships    Social connections     Talks on phone: Not on file     Gets together: Not on file     Attends Orthodox service: Not on file     Active member of club or organization: Not on file     Attends meetings of clubs or organizations: Not on file     Relationship status: Not on file    Intimate partner violence     Fear of current or ex partner: Not on file     Emotionally abused: Not on file     Physically abused: Not on file     Forced sexual activity: Not on file   Other Topics Concern    Not on file   Social History Narrative    ** Merged History Encounter **            FAMILY HISTORY:  Family History   Problem Relation Age of Onset    Diabetes Mother     Diabetes Other         neice, type 1        REVIEW OF SYSTEMS: Complete ROS assessed and noted for that which is described above, all else are negative.   Eyes: normal  ENT: normal  CVS: normal  Resp: normal  GI: normal  : normal  GYN: normal  Endocrine: normal  Integument: normal  Musculoskeletal: normal  Neuro: normal  Psych: normal      PHYSICAL EXAMINATION:    VITAL SIGNS:  Visit Vitals  /89 (BP 1 Location: Left arm, BP Patient Position: Sitting)   Pulse 80   Ht 5' 9\" (1.753 m)   Wt 142 lb (64.4 kg)   BMI 20.97 kg/m²       GENERAL: NCAT, Sitting comfortably, NAD  EYES: EOMI, non-icteric, no proptosis  Ear/Nose/Throat: NCAT, no inflammation, no masses  LYMPH NODES: No LAD  CARDIOVASCULAR: no JVD, No edema, normal peripheral perfusion  RESPIRATORY: no cyanosis, normal chest expansion, comfortable respirations  GASTROINTESTINAL: soft NT, ND,  MUSCULOSKELETAL: Normal ROM, no atrophy  SKIN: warm, no edema/rash/ or other skin changes  NEUROLOGIC: 5/5 power all extremities, no tremors, AAOx3  PSYCHIATRIC: Normal affect, Normal insight and judgement    REVIEW OF LABORATORY AND RADIOLOGY DATA:   Labs and documentation have been reviewed as described above. ASSESSMENT AND PLAN:   Kade Abreu is a 42 y.o. male with a PMHx as noted above who was referred to our endocrinology clinic for evaluation of uncontrolled type 1 diabetes. Problems:  Type 1 diabetes Uncontrolled  Hyperlipidemia  Hypertension  Vit D Deficiency    We had the pleasure of reviewing together the basics of diabetes including basic pathophysiology and diabetes care. We further discussed the importance of checking home glucose regularly and takin all of their scheduled medications in order to have the best possible outcome. I was able to answer any questions they had in clinic today and they are invited to reach me if they have any further questions. Based upon our discussion together today we have decided to make the following changes:    Patient with no supplies to check his sugars, thus no records, and desires to get back on track. Will order him a freestyle shailesh sensor kit per his request, and actually would encouraged regular monitoring of blood sugars.  I am refilling all of his insulin, changing the novolin R to novolog (he has a pen of novolog in use now which someone gave him). Regarding his prandial insulin dose, he is getting very little insulin when his sugars come down closer to goal and so he will not get adequate treatment this way. I will adjust his dose, at least for now, to 5 units across for each meal, and add a relaxed correction scale. His A1c is almost always 13-14% and I am concerned about his future. I have advised this very kind gentleman that I will work closely with him to get him back on track. He has a son and a young daughter and wants to be in better health for their sake, and I agree, for both their and his own sake also. PLAN  Type 1 Diabetes  Medications:  Lantus 23 units once daily  Change to Novolo units with each meal  Start 1:50 correction scale  Advised to check glucose ACHS (at least 4 times per day)  Provided with glucose log sheets for later review    Kidney: significant albuminuria, reviewed with patient    HTN: BP stable on current medications, no changes today. HLD: Cholesterol is very high, he is on gemfibrozil but not a statin, would benefit more from the statin, will review again upon f/u  Vit D Deficiency: started recently on ergo weekly, adding 5000 units of D3 once daily    RTC: I would like to see him back in 3-4 months. 60 minutes spent together with patient today of which >50% of this time was spent in counseling and coordination of care. Noah Bell.  [id number] [facility]

## 2020-03-25 NOTE — PATIENT INSTRUCTIONS
Vitamin D: take the weekly tablet as ordered by your other doctor, but add 5000 units of vitamin D3 once daily each morning, ordered. Lantus PEN: 23 units once daily, refilled    Novolog PEN: 5 units with each meal, refilled    Correction Scale: 1 unit for every 50 above 150    IF GLUCOSE IS:                 THEN TAKE:      0   Extra Unit  151-200   1   Extra Unit  201-250   2   Extra Units  251-300   3   Extra Units  301-350   4   Extra Units  351-400   5   Extra Units    Example: My planned insulin dose:    ____ Units    +    ____ Extra Correction Units  =  ____ total units to take together as one injection. Please note our new policy, you must arrive to the clinic 15 minutes before your appointment time to allow enough time for proper check-in, adequate time to spend with your doctor, and also to respect the appointment time of the next patient. Not arriving 15 minutes in advance may result in having your appointment rescheduled for the next available day/time.  ----------------------------------------------------------------------------------------------------------------------    Below you will find a glucose log sheet which you can use to record your blood sugars. Without checking and recording what your home glucose levels are, it will be difficult to make any changes to your medication dose, even when significant changes may be needed. Please feel free to use the log below to record your home glucose levels. At the very least, I would like for you to login the entire 2-3 weeks just before your visit so we can make your visit much more productive and beneficial to you. GLUCOSE LOG SHEET:    Date Breakfast Lunch Dinner Bedtime Comments ?                                                                                                                                                                                                                                                  GLUCOSE LOG SHEET:    Date Breakfast Lunch Dinner Bedtime Comments ? GLUCOSE LOG SHEET:    Date Breakfast Lunch Dinner Bedtime Comments ?

## 2020-03-26 ENCOUNTER — TELEPHONE (OUTPATIENT)
Dept: ENDOCRINOLOGY | Age: 40
End: 2020-03-26

## 2020-03-26 NOTE — TELEPHONE ENCOUNTER
I attempted to return this call and reached a voice mail. I left a message asking him to call his insurance and see which brand they do cover and call me back with that information and we will order that brand.   Stanley Mendoza

## 2020-03-26 NOTE — TELEPHONE ENCOUNTER
----- Message from Lilo Hernandez sent at 3/25/2020  4:38 PM EDT -----  Regarding: Dr Tati Kelley  General Message/Vendor Calls    Caller's first and last name:      Reason for call: Pt is reporting that the [facility] and testing supplies are not covered through his insurance and is requesting an alternate meter and supplies to be prescribed      Callback required yes/no and why:      Best contact number(s):[phone]      Details to clarify the request:      Lilo Hernandez

## 2020-03-27 ENCOUNTER — TELEPHONE (OUTPATIENT)
Dept: ENDOCRINOLOGY | Age: 40
End: 2020-03-27

## 2020-03-27 RX ORDER — LANCETS
EACH MISCELLANEOUS
Qty: 400 EACH | Refills: 3 | Status: SHIPPED | OUTPATIENT
Start: 2020-03-27 | End: 2020-08-04

## 2020-03-27 RX ORDER — INSULIN PUMP SYRINGE, 3 ML
EACH MISCELLANEOUS
Qty: 1 KIT | Refills: 0 | Status: SHIPPED | OUTPATIENT
Start: 2020-03-27 | End: 2020-08-04

## 2020-03-27 RX ORDER — LANCETS
EACH MISCELLANEOUS
Qty: 1 EACH | Refills: 11 | Status: CANCELLED | OUTPATIENT
Start: 2020-03-27

## 2020-03-27 RX ORDER — INSULIN PUMP SYRINGE, 3 ML
EACH MISCELLANEOUS
Qty: 1 KIT | Refills: 0 | Status: CANCELLED | OUTPATIENT
Start: 2020-03-27

## 2020-03-27 NOTE — TELEPHONE ENCOUNTER
Elida, in [facility] Cristobal is listed as PCP and in [address] Fabi Aguirre is listed.

## 2020-03-27 NOTE — TELEPHONE ENCOUNTER
Pt was made aware that the Hali Posey device is not covered under his medicaid plan. I called the pharmacy to see if they were aware of the covered device for Medicaid and was told to send over a generic script for them to use to search the system.

## 2020-03-27 NOTE — TELEPHONE ENCOUNTER
Sent script for all new testing supplies \"preferred brands\" requested for lowest copay. Kaitlyn Pendleton.  [address]

## 2020-06-03 ENCOUNTER — VIRTUAL VISIT (OUTPATIENT)
Dept: PRIMARY CARE CLINIC | Age: 40
End: 2020-06-03

## 2020-06-03 DIAGNOSIS — R74.8 ELEVATED ALKALINE PHOSPHATASE LEVEL: ICD-10-CM

## 2020-06-03 DIAGNOSIS — E10.40 DIABETIC NEUROPATHY, TYPE I DIABETES MELLITUS (HCC): ICD-10-CM

## 2020-06-03 DIAGNOSIS — I10 ESSENTIAL HYPERTENSION: ICD-10-CM

## 2020-06-03 DIAGNOSIS — N13.30 HYDRONEPHROSIS OF RIGHT KIDNEY: ICD-10-CM

## 2020-06-03 DIAGNOSIS — R80.9 PROTEINURIA, UNSPECIFIED TYPE: ICD-10-CM

## 2020-06-03 DIAGNOSIS — E55.9 VITAMIN D DEFICIENCY: ICD-10-CM

## 2020-06-03 DIAGNOSIS — E11.9 DM TYPE 2, GOAL HBA1C < 7% (HCC): Primary | ICD-10-CM

## 2020-06-03 DIAGNOSIS — M79.89 LEG SWELLING: ICD-10-CM

## 2020-06-03 DIAGNOSIS — K52.9 COLITIS: ICD-10-CM

## 2020-06-03 DIAGNOSIS — N32.89 BLADDER WALL THICKENING: ICD-10-CM

## 2020-06-03 RX ORDER — ERGOCALCIFEROL 1.25 MG/1
50000 CAPSULE ORAL
Qty: 4 CAP | Refills: 3 | Status: SHIPPED | OUTPATIENT
Start: 2020-06-03 | End: 2020-10-20

## 2020-06-03 RX ORDER — LISINOPRIL 5 MG/1
5 TABLET ORAL DAILY
Qty: 30 TAB | Refills: 0 | Status: SHIPPED | OUTPATIENT
Start: 2020-06-03 | End: 2020-06-30

## 2020-06-03 RX ORDER — POTASSIUM CHLORIDE 20 MEQ/1
20 TABLET, EXTENDED RELEASE ORAL DAILY
Qty: 30 TAB | Refills: 1 | Status: SHIPPED | OUTPATIENT
Start: 2020-06-03 | End: 2020-08-09

## 2020-06-03 RX ORDER — INSULIN GLARGINE 100 [IU]/ML
INJECTION, SOLUTION SUBCUTANEOUS
Qty: 10 ADJUSTABLE DOSE PRE-FILLED PEN SYRINGE | Refills: 3 | Status: SHIPPED | OUTPATIENT
Start: 2020-06-03 | End: 2021-04-01 | Stop reason: SDUPTHER

## 2020-06-03 RX ORDER — FUROSEMIDE 40 MG/1
40 TABLET ORAL DAILY
Qty: 30 TAB | Refills: 1 | Status: SHIPPED | OUTPATIENT
Start: 2020-06-03 | End: 2021-07-14 | Stop reason: SDUPTHER

## 2020-06-03 RX ORDER — METOPROLOL TARTRATE 25 MG/1
25 TABLET, FILM COATED ORAL EVERY 12 HOURS
Qty: 60 TAB | Refills: 0 | Status: SHIPPED | OUTPATIENT
Start: 2020-06-03 | End: 2020-06-30

## 2020-06-03 NOTE — PROGRESS NOTES
Randal Fenton is a 42 y.o. male who was seen by synchronous (real-time) audio-video technology on 6/3/2020. Consent: Randal Fenton, who was seen by synchronous (real-time) audio-video technology, and/or his healthcare decision maker, is aware that this patient-initiated, Telehealth encounter on 6/3/2020 is a billable service, with coverage as determined by his insurance carrier. He is aware that he may receive a bill and has provided verbal consent to proceed: Yes. Assessment & Plan:   Diagnoses and all orders for this visit:    1. DM type 2, goal HbA1c < 7% ([facility])  -     lisinopriL (PRINIVIL, ZESTRIL) 5 mg tablet; Take 1 Tab by mouth daily.  -     HEMOGLOBIN A1C WITH EAG  -     LIPID PANEL  -     CBC WITH AUTOMATED DIFF  -     METABOLIC PANEL, COMPREHENSIVE  -     insulin glargine (Lantus Solostar U-100 Insulin) 100 unit/mL (3 mL) inpn; adminster 30 units subcut daily    2. Bladder wall thickening    3. Elevated alkaline phosphatase level    4. Diabetic neuropathy, type I diabetes mellitus ([address]    5. Colitis    6. Leg swelling    7. Hydronephrosis of right kidney    8. Vitamin D deficiency  -     ergocalciferol (ERGOCALCIFEROL) 1,250 mcg (50,000 unit) capsule; Take 1 Cap by mouth every seven (7) days. -     VITAMIN D, 25 HYDROXY    9. Type 1 diabetes mellitus without complication (HCC)  -     insulin glargine (Lantus Solostar U-100 Insulin) 100 unit/mL (3 mL) inpn; adminster 30 units subcut daily    10. Essential hypertension  -     metoprolol tartrate (LOPRESSOR) 25 mg tablet; Take 1 Tab by mouth every twelve (12) hours. 11. Proteinuria, unspecified type  -     PROTEIN, URINE, 24 HR; Future    Other orders  -     furosemide (Lasix) 40 mg tablet; Take 1 Tab by mouth daily. -     potassium chloride (K-DUR, KLOR-CON) 20 mEq tablet; Take 1 Tab by mouth daily.     Colitis - symptoms - resolved    Leg swelling - asked pt to cut down on fluids    DM - needs better DM control, increase lantus to 30 units daily, watch for hypoglycemia  Eat lunch and  Dinner 30-45 minutes after administering insulin instead ppf hour and half. Recheck blood sugars two hours after lunch and dinner     HOld Flomax for now    May need to see Nephrology, Urology and GI. I spent at least 25  minutes on this visit with this established patient. Subjective: Harriett Homans is a 42 y.o. male who was seen for Diabetes      Prior to Admission medications    Medication Sig Start Date End Date Taking? Authorizing Provider   lancets misc Use preferred brand; Check glucose 4 times daily, Diagnosis E10.65 3/27/20   Cori Kruger MD   Blood-Glucose Meter monitoring kit 1 preferred brand glucometer for checking home glucose 3/27/20   Cori Kruger MD   glucose blood VI test strips (Pharmacist Choice) strip Use preferred brand; Check glucose 4 times daily, Diagnosis E10.65 3/27/20   Cori Kruger MD   cholecalciferol (VITAMIN D3) (5000 Units /125 mcg) capsule Take 1 Cap by mouth daily. 3/25/20   Cori Kruger MD   flash glucose scanning reader CUPS 14 Day [place]) [facility] CUPS [place] for use with 14 day sensors 3/25/20   Cori Kruger MD   flash glucose sensor (Korrio Connor 14 Day Sensor) kit 2 (14 day) sensors for use with [facility] PHF Scanner 3/25/20   Cori Kruger MD   insulin glargine (Lantus Solostar U-100 Insulin) 100 unit/mL (3 mL) inpn 23 Units by SubCUTAneous route daily.  3/25/20   Cori Kruger MD   Insulin Needles, Disposable, (BD Ultra-Fine Short Pen Needle) 31 gauge x 5/16\" ndle Use with insulin pens 4 times per day 3/25/20   Cori Kruger MD   insulin aspart U-100 (NovoLOG Flexpen U-100 Insulin) 100 unit/mL (3 mL) inpn (Novolog or Humalog, whichever more preferred: Inject 5 units with each meal + correction insulin, up to 30 units per day 3/25/20   Cori Kruger MD   ergocalciferol (ERGOCALCIFEROL) 1,250 mcg (50,000 unit) capsule Take 1 Cap by mouth every seven (7) days. 3/24/20   Carlos Johnson MD   gemfibroziL (LOPID) 600 mg tablet Take 1 Tab by mouth two (2) times a day. 3/24/20   Carlos Johnson MD   lisinopriL (PRINIVIL, ZESTRIL) 5 mg tablet Take 1 Tab by mouth daily. 3/12/20   Negro Peacock MD   metoprolol tartrate (LOPRESSOR) 25 mg tablet Take 1 Tab by mouth every twelve (12) hours. 3/11/20   Negro Peacock MD   pantoprazole (PROTONIX) 40 mg tablet Take 1 Tab by mouth two (2) times a day. 1/27/20   Gómez Dallas MD   tamsulosin (FLOMAX) 0.4 mg capsule Take 1 Cap by mouth daily. 1/6/20   Nathalie Jeans, MD     No Known Allergies    HIstory    Pt reports that he has swelling in both legs. It feels tight. He has protein in his urine. He did not get chance to do 24 hour urine protein  He has been drinking lot of fluids  He used to have low blood sugars at time during the night about 3 weeks back  It was around 60  However now he works nights and is awake all night long at work and has been drinking different soft drinks so he does not get low sugars. Infact now the sugars are in the 200 -270 range  He denies SOB. He says he is taking lantus and NOvolog. He is taking flomax as it was prescribed to him in the hospital but he does not have any bladder symptoms or difficulty urianting. Denies any GI symptoms    ROS    Objective:   Vital Signs: (As obtained by patient/caregiver at home)  There were no vitals taken for this visit.      [INSTRUCTIONS:  \"[x]\" Indicates a positive item  \"[]\" Indicates a negative item  -- DELETE ALL ITEMS NOT EXAMINED]    Constitutional: [x] Appears well-developed and well-nourished [x] No apparent distress      [] Abnormal -     Mental status: [x] Alert and awake  [x] Oriented to person/place/time [x] Able to follow commands    [] Abnormal -     Eyes:   EOM    [x]  Normal    [] Abnormal -   Sclera  [x]  Normal    [] Abnormal -          Discharge [x]  None visible   [] Abnormal -     HENT: [x] Normocephalic, atraumatic  [] Abnormal -   [x] Mouth/Throat: Mucous membranes are moist    External Ears [x] Normal  [] Abnormal -    Neck: [x] No visualized mass [] Abnormal -     Pulmonary/Chest: [x] Respiratory effort normal   [x] No visualized signs of difficulty breathing or respiratory distress        [] Abnormal -      Musculoskeletal:   [x] Normal gait with no signs of ataxia         [x] Normal range of motion of neck        [] Abnormal -     Neurological:        [x] No Facial Asymmetry (Cranial nerve 7 motor function) (limited exam due to video visit)          [x] No gaze palsy        [] Abnormal -          Skin:        [x] No significant exanthematous lesions or discoloration noted on facial skin         [] Abnormal - mild swelling both lower ext           Psychiatric:       [x] Normal Affect [] Abnormal -        [x] No Hallucinations    Other pertinent observable physical exam findings:-        We discussed the expected course, resolution and complications of the diagnosis(es) in detail. Medication risks, benefits, costs, interactions, and alternatives were discussed as indicated. I advised him to contact the office if his condition worsens, changes or fails to improve as anticipated. He expressed understanding with the diagnosis(es) and plan. Dyan Meadows is a 42 y.o. male who was evaluated by a video visit encounter for concerns as above. Patient identification was verified prior to start of the visit. A caregiver was present when appropriate. Due to this being a TeleHealth encounter (During RITSZ-21 public health emergency), evaluation of the following organ systems was limited: Vitals/Constitutional/EENT/Resp/CV/GI//MS/Neuro/Skin/Heme-Lymph-Imm.   Pursuant to the emergency declaration under the [facility] waiver authority and the [email or web address] and [employer] Act, this Virtual  Visit was conducted, with patient's (and/or legal guardian's) consent, to reduce the patient's risk of exposure to COVID-19 and provide necessary medical care. Services were provided through a video synchronous discussion virtually to substitute for in-person clinic visit. Patient and provider were located at their individual homes.       Aleks Kinney MD

## 2020-06-24 NOTE — H&P
Hospitalist Admission Note    NAME: Chad Velez   :  1980   MRN:  [id number]     Date/Time:  2019 4:53 PM    Patient PCP: None  _____________________________________________________________________  Given the patient's current clinical presentation, I have a high level of concern for decompensation if discharged from the emergency department. Complex decision making was performed, which includes reviewing the patient's available past medical records, laboratory results, and x-ray films. My assessment of this patient's clinical condition and my plan of care is as follows. Assessment / Plan:    Type 1 DM, uncontrolled in DKA  -IV insulin infusion. Hold lantus  -NS volume resuscitation   -follow AG, K, Phos closely. A1c pending    Intractable nausea and vomiting   Abdominal pain   Hematemesis? -he reports intermittent brown emesis with specks of coffee grounds  -Hg up probably due to hemoconcentration. Repeat after hydration  -start IV protonix  -allow clears for now  -check lipase    FELECIA  Volume depletion   Lactic acidosis  -continue IVF NS / volume expansion   -repeat lactic acid     Hx Depression / Bipolar   Hx Seizures  -denies     Code Status: Full  Surrogate Decision Maker:    DVT Prophylaxis:   SCD  GI Prophylaxis: not indicated          Subjective:   CHIEF COMPLAINT:  Weakness     HISTORY OF PRESENT ILLNESS:     Emmanuel Warren is a 41 y.o.  male with Type 1 DM who presents with progressive weakness and vomiting. He has been feeling sick since Friday night (2 days PTA). He has been vomiting several times, bringing up yellow and sometimes dark emesis. He has not eaten and has not used his insulin since this all started. He reportedly has not gotten out of bed since yesterday morning. EMS found a pitcher full of vomit next to him. ER evaluation is remarkable for elevated AG, acute renal failure and BG >500.   He was started on insulin drip and we Last sz one month ago  + low NA  Ct c/a/p ordered  Cont current meds   were asked to admit for work up and evaluation of the above problems. Past Medical History:   Diagnosis Date    [facility]     Bipolar disorder [facility]     Depression     Diabetes ([facility]     DKA, type 1 ([facility] 1/27/2013    diagnosed age 23    H/O noncompliance with medical treatment, presenting hazards to health     MRSA (methicillin resistant staph aureus) culture positive     MRSA (methicillin resistant Staphylococcus aureus)     Face    Noncompliance with medication regimen     Second hand smoke exposure     Seizure ([facility]     Seizures ([facility] 2006 or 2007    one episode during senior care        Past Surgical History:   Procedure Laterality Date    HX HEENT      top left wisdom tooth    HX ORTHOPAEDIC Left     wrist; [facility]    UPPER GI ENDOSCOPY,BIOPSY  11/20/2018            Social History     Tobacco Use    Smoking status: Current Some Day Smoker     Packs/day: 0.10     Years: 16.00     Pack years: 1.60     Types: Cigarettes    Smokeless tobacco: Never Used   Substance Use Topics    Alcohol use: No        Family History   Problem Relation Age of Onset    Diabetes Mother     Diabetes Other         neice, type 1      No Known Allergies     Prior to Admission medications    Medication Sig Start Date End Date Taking? Authorizing Provider   insulin glargine (LANTUS U-100 INSULIN) 100 unit/mL injection 46 Units by SubCUTAneous route daily. Yes Provider, Historical   insulin regular (NOVOLIN R) 100 unit/mL injection 2-10 Units by SubCUTAneous route Before breakfast, lunch, and dinner.    Yes Provider, Historical       REVIEW OF SYSTEMS:       Total of 12 systems reviewed as follows:       POSITIVE= underlined text  Negative = text not underlined  General:  fever, chills, sweats, generalized weakness, weight loss/gain,      loss of appetite   Eyes:    blurred vision, eye pain, loss of vision, double vision  ENT:    rhinorrhea, pharyngitis   Respiratory:   cough, sputum production, SOB, [place], wheezing, pleuritic pain   Cardiology:   chest pain, palpitations, orthopnea, PND, edema, syncope   Gastrointestinal:  abdominal pain , N/V, diarrhea, dysphagia, constipation, bleeding   Genitourinary:  frequency, urgency, dysuria, hematuria, incontinence   Muskuloskeletal :  arthralgia, myalgia, back pain  Hematology:  easy bruising, nose or gum bleeding, lymphadenopathy   Dermatological: rash, ulceration, pruritis, color change / jaundice  Endocrine:   hot flashes or polydipsia   Neurological:  headache, dizziness, confusion, focal weakness, paresthesia,     Speech difficulties, memory loss, gait difficulty  Psychological: Feelings of anxiety, depression, agitation    Objective:   VITALS:    Visit Vitals  /86 (BP 1 Location: Right arm, BP Patient Position: At rest)   Pulse (!) 122   Temp 96.5 °F (35.8 °C)   Resp 22   Ht 5' 9\" (1.753 m)   Wt 54.8 kg (120 lb 14.4 oz)   SpO2 98%   BMI 17.85 kg/m²       PHYSICAL EXAM:    General:    Alert, cooperative, + distress from nausea and vomiting, appears stated age. HEENT: Atraumatic, anicteric sclerae, pink conjunctivae  Neck:  Supple, symmetrical,  thyroid: non tender  Lungs:   Clear to auscultation bilaterally. No Wheezing or Rhonchi. No rales. Chest wall:  No tenderness  No Accessory muscle use. Heart:   Regular  rhythm,  No  murmur   No edema  Abdomen:   Soft, mild diffuse tenderness. Not distended. Bowel sounds normal  Extremities: No cyanosis. No clubbing,  Skin turgor normal, Capillary refill normal, Radial dial pulse 2+  Skin:     Not pale. Not Jaundiced  No rashes   Psych:  Good insight. Not depressed. Not anxious or agitated. Neurologic: EOMs intact. No facial asymmetry. No aphasia or slurred speech. Symmetrical strength, Sensation grossly intact.  Alert and oriented X 4.     _______________________________________________________________________  Care Plan discussed with:    Comments   Patient x    Family      RN x    Care Manager Consultant:      _______________________________________________________________________  Expected  Disposition:   Home with Family    HH/PT/OT/RN    SNF/LTC    CATHIE    ________________________________________________________________________  TOTAL TIME:    Minutes    Critical Care Provided  50   Minutes non procedure based  I have provided        minutes of critical care time. During this entire length of time I was immediately available to the patient. The reason for providing this level of medical care was due to a critical illness that impaired one or more vital organ systems, such that there was a high probability of imminent or life threatening deterioration in the patient's condition. This care involved high complexity decision making which includes reviewing the patient's past medical records, current laboratory results, and actual Xray films in order to assess, support vital system function, and to treat this degree of vital organ system failure, and to prevent further life threatening deterioration of the patients condition. I have also discussed this case with the involved ED physician         Comments    x Reviewed previous records   >50% of visit spent in counseling and coordination of care  Discussion with patient and/or family and questions answered       Given the patient's current clinical presentation, I have a high level of concern for decompensation if discharged from the ED. Complex decision making was performed which includes reviewing the patient's available past medical records, laboratory results, and Xray films. I have also directly communicated my plan and discussed this case with the involved ED physician.     ____________________________________________________________________  Liz Ruano MD    Procedures: see electronic medical records for all procedures/Xrays and details which were not copied into this note but were reviewed prior to creation of Plan.     LAB DATA REVIEWED:    Recent Results (from the past 24 hour(s))   GLUCOSE, POC    Collection Time: 04/14/19  1:00 PM   Result Value Ref Range    Glucose (POC) 574 (H) 65 - 100 mg/dL    Performed by Conrad Workman    CBC WITH AUTOMATED DIFF    Collection Time: 04/14/19  1:10 PM   Result Value Ref Range    WBC 12.5 (H) 4.1 - 11.1 K/uL    RBC 5.50 4. 10 - 5.70 M/uL    HGB 17.9 (H) 12.1 - 17.0 g/dL    HCT 51.2 (H) 36.6 - 50.3 %    MCV 93.1 80.0 - 99.0 FL    MCH 32.5 26.0 - 34.0 PG    MCHC 35.0 30.0 - 36.5 g/dL    RDW 13.2 11.5 - 14.5 %    PLATELET 314 976 - 792 K/uL    MPV 10.9 8.9 - 12.9 FL    NRBC 0.0 0  WBC    ABSOLUTE NRBC 0.00 0.00 - 0.01 K/uL    NEUTROPHILS 82 (H) 32 - 75 %    LYMPHOCYTES 13 12 - 49 %    MONOCYTES 4 (L) 5 - 13 %    EOSINOPHILS 0 0 - 7 %    BASOPHILS 0 0 - 1 %    IMMATURE GRANULOCYTES 1 (H) 0.0 - 0.5 %    ABS. NEUTROPHILS 10.2 (H) 1.8 - 8.0 K/UL    ABS. LYMPHOCYTES 1.6 0.8 - 3.5 K/UL    ABS. MONOCYTES 0.6 0.0 - 1.0 K/UL    ABS. EOSINOPHILS 0.0 0.0 - 0.4 K/UL    ABS. BASOPHILS 0.0 0.0 - 0.1 K/UL    ABS. IMM. GRANS. 0.1 (H) 0.00 - 0.04 K/UL    DF AUTOMATED     METABOLIC PANEL, COMPREHENSIVE    Collection Time: 04/14/19  1:10 PM   Result Value Ref Range    Sodium 132 (L) 136 - 145 mmol/L    Potassium 4.5 3.5 - 5.1 mmol/L    Chloride 90 (L) 97 - 108 mmol/L    CO2 7 (LL) 21 - 32 mmol/L    Anion gap 35 (H) 5 - 15 mmol/L    Glucose 629 (HH) 65 - 100 mg/dL    BUN 38 (H) 6 - 20 MG/DL    Creatinine 2.09 (H) 0.70 - 1.30 MG/DL    BUN/Creatinine ratio 18 12 - 20      GFR est AA 44 (L) >60 ml/min/1.73m2    GFR est non-AA 36 (L) >60 ml/min/1.73m2    Calcium 9.4 8.5 - 10.1 MG/DL    Bilirubin, total 0.5 0.2 - 1.0 MG/DL    ALT (SGPT) 81 (H) 12 - 78 U/L    AST (SGOT) 16 15 - 37 U/L    Alk.  phosphatase 214 (H) 45 - 117 U/L    Protein, total 8.6 (H) 6.4 - 8.2 g/dL    Albumin 4.3 3.5 - 5.0 g/dL    Globulin 4.3 (H) 2.0 - 4.0 g/dL    A-G Ratio 1.0 (L) 1.1 - 2.2     LACTIC ACID    Collection Time: 04/14/19  1:10 PM Result Value Ref Range    Lactic acid 2.3 (HH) 0.4 - 2.0 MMOL/L   TROPONIN I    Collection Time: 04/14/19  1:10 PM   Result Value Ref Range    Troponin-I, Qt. <0.05 <0.05 ng/mL   MAGNESIUM    Collection Time: 04/14/19  1:10 PM   Result Value Ref Range    Magnesium 2.6 (H) 1.6 - 2.4 mg/dL   EKG, 12 LEAD, INITIAL    Collection Time: 04/14/19  1:20 PM   Result Value Ref Range    Ventricular Rate 107 BPM    Atrial Rate 107 BPM    P-R Interval 138 ms    QRS Duration 96 ms    Q-T Interval 370 ms    QTC Calculation (Bezet) 493 ms    Calculated P Axis 90 degrees    Calculated R Axis 149 degrees    Calculated T Axis 77 degrees    Diagnosis       ** Suspect arm lead reversal, interpretation assumes no reversal  Sinus tachycardia  Biatrial enlargement  Right axis deviation  Pulmonary disease pattern  When compared with ECG of 18-NOV-2018 20:39,  No significant change was found     GLUCOSE, POC    Collection Time: 04/14/19  3:43 PM   Result Value Ref Range    Glucose (POC) >600 (HH) 65 - 100 mg/dL    Performed by Shane Canada    Collection Time: 04/14/19  3:47 PM   Result Value Ref Range    Glucose 601 mg/dL    Insulin order 10.8 units/hour    Insulin adminstered 10.8 units/hour    Multiplier 0.020     Low target 150 mg/dL    High target 250 mg/dL    D50 order 0.0 ml    D50 administered 0.00 ml    Minutes until next BG 60 min    Order initials anl     Administered initials anl     GLSCOM Comments

## 2020-06-30 DIAGNOSIS — I10 ESSENTIAL HYPERTENSION: ICD-10-CM

## 2020-06-30 DIAGNOSIS — E11.9 DM TYPE 2, GOAL HBA1C < 7% (HCC): ICD-10-CM

## 2020-06-30 RX ORDER — METOPROLOL TARTRATE 25 MG/1
TABLET, FILM COATED ORAL
Qty: 60 TAB | Refills: 0 | Status: SHIPPED | OUTPATIENT
Start: 2020-06-30 | End: 2020-08-09

## 2020-06-30 RX ORDER — LISINOPRIL 5 MG/1
TABLET ORAL
Qty: 30 TAB | Refills: 0 | Status: SHIPPED | OUTPATIENT
Start: 2020-06-30 | End: 2020-08-09

## 2020-07-22 ENCOUNTER — DOCUMENTATION ONLY (OUTPATIENT)
Dept: ENDOCRINOLOGY | Age: 40
End: 2020-07-22

## 2020-08-04 ENCOUNTER — HOSPITAL ENCOUNTER (INPATIENT)
Age: 40
LOS: 2 days | Discharge: HOME | DRG: 420 | End: 2020-08-06
Attending: EMERGENCY MEDICINE | Admitting: INTERNAL MEDICINE
Payer: COMMERCIAL

## 2020-08-04 DIAGNOSIS — E10.10 DIABETIC KETOACIDOSIS WITHOUT COMA ASSOCIATED WITH TYPE 1 DIABETES MELLITUS (HCC): Primary | ICD-10-CM

## 2020-08-04 LAB
ADMINISTERED INITIALS, ADMINIT: NORMAL
ALBUMIN SERPL-MCNC: 3.4 G/DL (ref 3.5–5)
ALBUMIN/GLOB SERPL: 0.8 {RATIO} (ref 1.1–2.2)
ALP SERPL-CCNC: 132 U/L (ref 45–117)
ALT SERPL-CCNC: 21 U/L (ref 12–78)
ANION GAP SERPL CALC-SCNC: 20 MMOL/L (ref 5–15)
ANION GAP SERPL CALC-SCNC: 24 MMOL/L (ref 5–15)
ANION GAP SERPL CALC-SCNC: 5 MMOL/L (ref 5–15)
ANION GAP SERPL CALC-SCNC: 9 MMOL/L (ref 5–15)
APPEARANCE UR: CLEAR
AST SERPL-CCNC: 14 U/L (ref 15–37)
BACTERIA URNS QL MICRO: NEGATIVE /HPF
BASOPHILS # BLD: 0 K/UL (ref 0–0.1)
BASOPHILS NFR BLD: 0 % (ref 0–1)
BILIRUB SERPL-MCNC: 0.5 MG/DL (ref 0.2–1)
BILIRUB UR QL: NEGATIVE
BUN SERPL-MCNC: 36 MG/DL (ref 6–20)
BUN SERPL-MCNC: 37 MG/DL (ref 6–20)
BUN/CREAT SERPL: 22 (ref 12–20)
BUN/CREAT SERPL: 23 (ref 12–20)
BUN/CREAT SERPL: 25 (ref 12–20)
BUN/CREAT SERPL: 26 (ref 12–20)
CALCIUM SERPL-MCNC: 8.2 MG/DL (ref 8.5–10.1)
CALCIUM SERPL-MCNC: 8.5 MG/DL (ref 8.5–10.1)
CALCIUM SERPL-MCNC: 8.6 MG/DL (ref 8.5–10.1)
CALCIUM SERPL-MCNC: 9.5 MG/DL (ref 8.5–10.1)
CHLORIDE SERPL-SCNC: 101 MMOL/L (ref 97–108)
CHLORIDE SERPL-SCNC: 103 MMOL/L (ref 97–108)
CHLORIDE SERPL-SCNC: 108 MMOL/L (ref 97–108)
CHLORIDE SERPL-SCNC: 90 MMOL/L (ref 97–108)
CO2 SERPL-SCNC: 16 MMOL/L (ref 21–32)
CO2 SERPL-SCNC: 18 MMOL/L (ref 21–32)
CO2 SERPL-SCNC: 28 MMOL/L (ref 21–32)
CO2 SERPL-SCNC: 31 MMOL/L (ref 21–32)
COLOR UR: ABNORMAL
CREAT SERPL-MCNC: 1.4 MG/DL (ref 0.7–1.3)
CREAT SERPL-MCNC: 1.46 MG/DL (ref 0.7–1.3)
CREAT SERPL-MCNC: 1.6 MG/DL (ref 0.7–1.3)
CREAT SERPL-MCNC: 1.62 MG/DL (ref 0.7–1.3)
D50 ADMINISTERED, D50ADM: 0 ML
D50 ADMINISTERED, D50ADM: 12 ML
D50 ORDER, D50ORD: 0 ML
D50 ORDER, D50ORD: 12 ML
DIFFERENTIAL METHOD BLD: ABNORMAL
EOSINOPHIL # BLD: 0.1 K/UL (ref 0–0.4)
EOSINOPHIL NFR BLD: 1 % (ref 0–7)
EPITH CASTS URNS QL MICRO: ABNORMAL /LPF
ERYTHROCYTE [DISTWIDTH] IN BLOOD BY AUTOMATED COUNT: 13.1 % (ref 11.5–14.5)
GASTROCULT GAST QL: POSITIVE
GLOBULIN SER CALC-MCNC: 4.3 G/DL (ref 2–4)
GLSCOM COMMENTS: NORMAL
GLUCOSE BLD STRIP.AUTO-MCNC: 104 MG/DL (ref 65–100)
GLUCOSE BLD STRIP.AUTO-MCNC: 142 MG/DL (ref 65–100)
GLUCOSE BLD STRIP.AUTO-MCNC: 192 MG/DL (ref 65–100)
GLUCOSE BLD STRIP.AUTO-MCNC: 234 MG/DL (ref 65–100)
GLUCOSE BLD STRIP.AUTO-MCNC: 248 MG/DL (ref 65–100)
GLUCOSE BLD STRIP.AUTO-MCNC: 348 MG/DL (ref 65–100)
GLUCOSE BLD STRIP.AUTO-MCNC: 367 MG/DL (ref 65–100)
GLUCOSE BLD STRIP.AUTO-MCNC: 389 MG/DL (ref 65–100)
GLUCOSE BLD STRIP.AUTO-MCNC: 485 MG/DL (ref 65–100)
GLUCOSE BLD STRIP.AUTO-MCNC: 70 MG/DL (ref 65–100)
GLUCOSE BLD STRIP.AUTO-MCNC: 86 MG/DL (ref 65–100)
GLUCOSE BLD STRIP.AUTO-MCNC: 96 MG/DL (ref 65–100)
GLUCOSE SERPL-MCNC: 250 MG/DL (ref 65–100)
GLUCOSE SERPL-MCNC: 357 MG/DL (ref 65–100)
GLUCOSE SERPL-MCNC: 466 MG/DL (ref 65–100)
GLUCOSE SERPL-MCNC: 87 MG/DL (ref 65–100)
GLUCOSE UR STRIP.AUTO-MCNC: >1000 MG/DL
GLUCOSE, GLC: 104 MG/DL
GLUCOSE, GLC: 134 MG/DL
GLUCOSE, GLC: 142 MG/DL
GLUCOSE, GLC: 192 MG/DL
GLUCOSE, GLC: 234 MG/DL
GLUCOSE, GLC: 248 MG/DL
GLUCOSE, GLC: 348 MG/DL
GLUCOSE, GLC: 367 MG/DL
GLUCOSE, GLC: 70 MG/DL
GLUCOSE, GLC: 86 MG/DL
GLUCOSE, GLC: 96 MG/DL
HCT VFR BLD AUTO: 36.7 % (ref 36.6–50.3)
HGB BLD-MCNC: 12.9 G/DL (ref 12.1–17)
HGB UR QL STRIP: ABNORMAL
HIGH TARGET, HITG: 250 MG/DL
HYALINE CASTS URNS QL MICRO: ABNORMAL /LPF (ref 0–5)
IMM GRANULOCYTES # BLD AUTO: 0.1 K/UL (ref 0–0.04)
IMM GRANULOCYTES NFR BLD AUTO: 1 % (ref 0–0.5)
INSULIN ADMINSTERED, INSADM: 0 UNITS/HOUR
INSULIN ADMINSTERED, INSADM: 0.7 UNITS/HOUR
INSULIN ADMINSTERED, INSADM: 1.6 UNITS/HOUR
INSULIN ADMINSTERED, INSADM: 4 UNITS/HOUR
INSULIN ADMINSTERED, INSADM: 5.2 UNITS/HOUR
INSULIN ADMINSTERED, INSADM: 5.6 UNITS/HOUR
INSULIN ADMINSTERED, INSADM: 5.8 UNITS/HOUR
INSULIN ADMINSTERED, INSADM: 9.2 UNITS/HOUR
INSULIN ORDER, INSORD: 0 UNITS/HOUR
INSULIN ORDER, INSORD: 0.7 UNITS/HOUR
INSULIN ORDER, INSORD: 1.6 UNITS/HOUR
INSULIN ORDER, INSORD: 4 UNITS/HOUR
INSULIN ORDER, INSORD: 5.2 UNITS/HOUR
INSULIN ORDER, INSORD: 5.6 UNITS/HOUR
INSULIN ORDER, INSORD: 5.8 UNITS/HOUR
INSULIN ORDER, INSORD: 9.2 UNITS/HOUR
KETONES UR QL STRIP.AUTO: >80 MG/DL
LEUKOCYTE ESTERASE UR QL STRIP.AUTO: NEGATIVE
LOW TARGET, LOT: 150 MG/DL
LYMPHOCYTES # BLD: 1.4 K/UL (ref 0.8–3.5)
LYMPHOCYTES NFR BLD: 13 % (ref 12–49)
MAGNESIUM SERPL-MCNC: 2.3 MG/DL (ref 1.6–2.4)
MAGNESIUM SERPL-MCNC: 2.3 MG/DL (ref 1.6–2.4)
MAGNESIUM SERPL-MCNC: 2.4 MG/DL (ref 1.6–2.4)
MCH RBC QN AUTO: 31.7 PG (ref 26–34)
MCHC RBC AUTO-ENTMCNC: 35.1 G/DL (ref 30–36.5)
MCV RBC AUTO: 90.2 FL (ref 80–99)
MINUTES UNTIL NEXT BG, NBG: 15 MIN
MINUTES UNTIL NEXT BG, NBG: 60 MIN
MONOCYTES # BLD: 0.5 K/UL (ref 0–1)
MONOCYTES NFR BLD: 4 % (ref 5–13)
MULTIPLIER, MUL: 0
MULTIPLIER, MUL: 0.01
MULTIPLIER, MUL: 0.02
MULTIPLIER, MUL: 0.02
MULTIPLIER, MUL: 0.03
NEUTS SEG # BLD: 8.6 K/UL (ref 1.8–8)
NEUTS SEG NFR BLD: 81 % (ref 32–75)
NITRITE UR QL STRIP.AUTO: NEGATIVE
NRBC # BLD: 0 K/UL (ref 0–0.01)
NRBC BLD-RTO: 0 PER 100 WBC
ORDER INITIALS, ORDINIT: NORMAL
PH GAST: 3 [PH] (ref 1.5–3.5)
PH UR STRIP: 5.5 [PH] (ref 5–8)
PHOSPHATE SERPL-MCNC: 4 MG/DL (ref 2.6–4.7)
PLATELET # BLD AUTO: 442 K/UL (ref 150–400)
PMV BLD AUTO: 11 FL (ref 8.9–12.9)
POTASSIUM SERPL-SCNC: 3.3 MMOL/L (ref 3.5–5.1)
POTASSIUM SERPL-SCNC: 3.4 MMOL/L (ref 3.5–5.1)
POTASSIUM SERPL-SCNC: 3.6 MMOL/L (ref 3.5–5.1)
POTASSIUM SERPL-SCNC: 4.1 MMOL/L (ref 3.5–5.1)
PROT SERPL-MCNC: 7.7 G/DL (ref 6.4–8.2)
PROT UR STRIP-MCNC: 100 MG/DL
RBC # BLD AUTO: 4.07 M/UL (ref 4.1–5.7)
RBC #/AREA URNS HPF: ABNORMAL /HPF (ref 0–5)
SERVICE CMNT-IMP: ABNORMAL
SERVICE CMNT-IMP: NORMAL
SODIUM SERPL-SCNC: 132 MMOL/L (ref 136–145)
SODIUM SERPL-SCNC: 137 MMOL/L (ref 136–145)
SODIUM SERPL-SCNC: 140 MMOL/L (ref 136–145)
SODIUM SERPL-SCNC: 144 MMOL/L (ref 136–145)
SP GR UR REFRACTOMETRY: 1.02 (ref 1–1.03)
UA: UC IF INDICATED,UAUC: ABNORMAL
UROBILINOGEN UR QL STRIP.AUTO: 0.2 EU/DL (ref 0.2–1)
WBC # BLD AUTO: 10.6 K/UL (ref 4.1–11.1)
WBC URNS QL MICRO: ABNORMAL /HPF (ref 0–4)

## 2020-08-04 PROCEDURE — 74011000250 HC RX REV CODE- 250: Performed by: INTERNAL MEDICINE

## 2020-08-04 PROCEDURE — 82962 GLUCOSE BLOOD TEST: CPT

## 2020-08-04 PROCEDURE — 84100 ASSAY OF PHOSPHORUS: CPT

## 2020-08-04 PROCEDURE — 83735 ASSAY OF MAGNESIUM: CPT

## 2020-08-04 PROCEDURE — 74011636637 HC RX REV CODE- 636/637: Performed by: EMERGENCY MEDICINE

## 2020-08-04 PROCEDURE — C9113 INJ PANTOPRAZOLE SODIUM, VIA: HCPCS | Performed by: INTERNAL MEDICINE

## 2020-08-04 PROCEDURE — 80053 COMPREHEN METABOLIC PANEL: CPT

## 2020-08-04 PROCEDURE — 96361 HYDRATE IV INFUSION ADD-ON: CPT

## 2020-08-04 PROCEDURE — 96374 THER/PROPH/DIAG INJ IV PUSH: CPT

## 2020-08-04 PROCEDURE — 74011250636 HC RX REV CODE- 250/636: Performed by: EMERGENCY MEDICINE

## 2020-08-04 PROCEDURE — 74011250637 HC RX REV CODE- 250/637: Performed by: INTERNAL MEDICINE

## 2020-08-04 PROCEDURE — 80048 BASIC METABOLIC PNL TOTAL CA: CPT

## 2020-08-04 PROCEDURE — 74011250636 HC RX REV CODE- 250/636: Performed by: INTERNAL MEDICINE

## 2020-08-04 PROCEDURE — 82271 OCCULT BLOOD OTHER SOURCES: CPT

## 2020-08-04 PROCEDURE — 74011000258 HC RX REV CODE- 258: Performed by: INTERNAL MEDICINE

## 2020-08-04 PROCEDURE — 99285 EMERGENCY DEPT VISIT HI MDM: CPT

## 2020-08-04 PROCEDURE — 96375 TX/PRO/DX INJ NEW DRUG ADDON: CPT

## 2020-08-04 PROCEDURE — 81001 URINALYSIS AUTO W/SCOPE: CPT

## 2020-08-04 PROCEDURE — 96376 TX/PRO/DX INJ SAME DRUG ADON: CPT

## 2020-08-04 PROCEDURE — 65660000000 HC RM CCU STEPDOWN

## 2020-08-04 PROCEDURE — 36415 COLL VENOUS BLD VENIPUNCTURE: CPT

## 2020-08-04 PROCEDURE — 85025 COMPLETE CBC W/AUTO DIFF WBC: CPT

## 2020-08-04 PROCEDURE — 74011636637 HC RX REV CODE- 636/637: Performed by: INTERNAL MEDICINE

## 2020-08-04 RX ORDER — ONDANSETRON 2 MG/ML
4 INJECTION INTRAMUSCULAR; INTRAVENOUS
Status: COMPLETED | OUTPATIENT
Start: 2020-08-04 | End: 2020-08-04

## 2020-08-04 RX ORDER — SODIUM CHLORIDE 9 MG/ML
125 INJECTION, SOLUTION INTRAVENOUS CONTINUOUS
Status: DISCONTINUED | OUTPATIENT
Start: 2020-08-04 | End: 2020-08-05

## 2020-08-04 RX ORDER — ONDANSETRON 2 MG/ML
4 INJECTION INTRAMUSCULAR; INTRAVENOUS
Status: DISCONTINUED | OUTPATIENT
Start: 2020-08-04 | End: 2020-08-06 | Stop reason: HOSPADM

## 2020-08-04 RX ORDER — TAMSULOSIN HYDROCHLORIDE 0.4 MG/1
0.4 CAPSULE ORAL DAILY
Status: DISCONTINUED | OUTPATIENT
Start: 2020-08-05 | End: 2020-08-06 | Stop reason: HOSPADM

## 2020-08-04 RX ORDER — GEMFIBROZIL 600 MG/1
600 TABLET, FILM COATED ORAL 2 TIMES DAILY
Status: DISCONTINUED | OUTPATIENT
Start: 2020-08-04 | End: 2020-08-06 | Stop reason: HOSPADM

## 2020-08-04 RX ORDER — DEXTROSE 50 % IN WATER (D50W) INTRAVENOUS SYRINGE
25-50 AS NEEDED
Status: DISCONTINUED | OUTPATIENT
Start: 2020-08-04 | End: 2020-08-05

## 2020-08-04 RX ORDER — PANTOPRAZOLE SODIUM 40 MG/1
40 TABLET, DELAYED RELEASE ORAL 2 TIMES DAILY
Status: DISCONTINUED | OUTPATIENT
Start: 2020-08-04 | End: 2020-08-04

## 2020-08-04 RX ORDER — POTASSIUM CHLORIDE 20 MEQ/1
20 TABLET, EXTENDED RELEASE ORAL DAILY
Status: DISCONTINUED | OUTPATIENT
Start: 2020-08-05 | End: 2020-08-06 | Stop reason: HOSPADM

## 2020-08-04 RX ORDER — METOPROLOL TARTRATE 25 MG/1
25 TABLET, FILM COATED ORAL 2 TIMES DAILY
Status: DISCONTINUED | OUTPATIENT
Start: 2020-08-04 | End: 2020-08-06 | Stop reason: HOSPADM

## 2020-08-04 RX ORDER — MAGNESIUM SULFATE 100 %
4 CRYSTALS MISCELLANEOUS AS NEEDED
Status: DISCONTINUED | OUTPATIENT
Start: 2020-08-04 | End: 2020-08-05

## 2020-08-04 RX ORDER — CHOLECALCIFEROL TAB 125 MCG (5000 UNIT) 125 MCG
5000 TAB ORAL DAILY
COMMUNITY
End: 2020-09-25 | Stop reason: SDUPTHER

## 2020-08-04 RX ADMIN — ONDANSETRON 4 MG: 2 INJECTION INTRAMUSCULAR; INTRAVENOUS at 09:52

## 2020-08-04 RX ADMIN — HUMAN INSULIN 10 UNITS: 100 INJECTION, SOLUTION SUBCUTANEOUS at 09:28

## 2020-08-04 RX ADMIN — SODIUM CHLORIDE 40 MG: 9 INJECTION, SOLUTION INTRAMUSCULAR; INTRAVENOUS; SUBCUTANEOUS at 20:38

## 2020-08-04 RX ADMIN — DEXTROSE MONOHYDRATE 6 G: 25 INJECTION, SOLUTION INTRAVENOUS at 23:00

## 2020-08-04 RX ADMIN — SODIUM CHLORIDE 125 ML/HR: 900 INJECTION, SOLUTION INTRAVENOUS at 12:30

## 2020-08-04 RX ADMIN — ONDANSETRON 4 MG: 2 INJECTION INTRAMUSCULAR; INTRAVENOUS at 20:38

## 2020-08-04 RX ADMIN — GEMFIBROZIL 600 MG: 600 TABLET ORAL at 17:14

## 2020-08-04 RX ADMIN — ONDANSETRON 4 MG: 2 INJECTION INTRAMUSCULAR; INTRAVENOUS at 07:30

## 2020-08-04 RX ADMIN — SODIUM CHLORIDE 5.8 UNITS/HR: 9 INJECTION, SOLUTION INTRAVENOUS at 12:46

## 2020-08-04 RX ADMIN — SODIUM CHLORIDE 1000 ML: 9 INJECTION, SOLUTION INTRAVENOUS at 09:53

## 2020-08-04 RX ADMIN — SODIUM CHLORIDE 1000 ML: 9 INJECTION, SOLUTION INTRAVENOUS at 09:28

## 2020-08-04 RX ADMIN — SODIUM CHLORIDE 125 ML/HR: 900 INJECTION, SOLUTION INTRAVENOUS at 20:46

## 2020-08-04 RX ADMIN — ONDANSETRON 4 MG: 2 INJECTION INTRAMUSCULAR; INTRAVENOUS at 14:44

## 2020-08-04 RX ADMIN — METOPROLOL TARTRATE 25 MG: 25 TABLET, FILM COATED ORAL at 17:14

## 2020-08-04 RX ADMIN — SODIUM CHLORIDE 40 MG: 9 INJECTION, SOLUTION INTRAMUSCULAR; INTRAVENOUS; SUBCUTANEOUS at 14:44

## 2020-08-04 NOTE — PROGRESS NOTES
Problem: DKA: Day 1  Goal: Off Pathway (Use only if patient is Off Pathway)  Outcome: Progressing Towards Goal

## 2020-08-04 NOTE — H&P
Hospitalist Admission Note    NAME: Geoffrey Wilson   :  1980   MRN:  [id number]     Date/Time:  2020 12:09 PM    Patient PCP: Ruthann Booth MD  ________________________________________________________________________    My assessment of this patient's clinical condition and my plan of care is as follows. Assessment / Plan:  Diabetic ketoacidosis unclear precipitant  -Patient reports being compliant with his medications  -He does not report any fever, chills, cough, shortness of breath or myalgias to suspect COVID  -Urine analysis is clear. Will get chest x-ray  -Start IV insulin per DKA protocol  -Check BMP, magnesium and phosphorus every 4 hours and replace  -Once anion gap closes on 2 successive BMPs, will switch to subcutaneous insulin and stop insulin drip after 2 hours  -Keep him n.p.o. with ice chips until then  -Check Hba1c    Nausea vomiting and abdominal pain likely in the setting of DKA  -Patient reports that he is seeing some dark stuff in the vomit  -Check vomit for occult blood  -Start Protonix 40 mg IV twice daily  -Above symptoms most likely related to DKA and should resolve once blood sugars are better  -Consider further work-up if symptoms are persistent    Acute kidney injury likely prerenal  -Baseline creatinine is normal and currently creatinine is 1.6  -Start IV fluids. UA is normal  -Hold all nephrotoxic medications including Lasix, lisinopril  -Check BMP in a.m. Hypertension  -Continue metoprolol. Hold lisinopril    Dyslipidemia  History of GERD  BPH ?  -Continue gemfibrozil  -Continue Protonix but changed to IV  -Continue home Flomax    I have provided   55     minutes of critical care time.   During this entire length of time I was immediately available to the patient.       The reason for providing this level of medical care was due to a critical illness that impaired one or more vital organ systems, such that there was a high probability of imminent or life threatening deterioration in the patient's condition. This care involved high complexity decision making which includes reviewing the patient's past medical records, current laboratory results, and actual Xray films in order to assess, support vital system function, and to treat this degree of vital organ system failure, and to prevent further life threatening deterioration of the patients condition. Code Status: Full code  Surrogate Decision Maker: Mother Khadijah    DVT Prophylaxis: Heparin  GI Prophylaxis: not indicated    Baseline: From home independent        Subjective:   CHIEF COMPLAINT: N/V    HISTORY OF PRESENT ILLNESS:     Reji Taylor is a 42 y.o.   male who presents with diabetes mellitus, hypertension is coming the hospital chief complaints of, nausea vomiting and also very high blood sugars. Patient reports that he was in his usual state of health until about 2 days ago when he started having nausea, vomiting along with abdominal pain. He reports pain is mostly generalized, mild, 2 x 10, nonradiating, without any aggravating or relieving factors. He reports nausea along with 2-3 episodes of vomitings per day which are dark, liquidy, non-foul-smelling and with no foot. He does not report any diarrhea or constipation. Denies chest pain or shortness of breath. Denies fever, chills, cough or phlegm. Denies focal weakness, tingling or numbness. On arrival to the hospital, his vital signs were within normal limits. On labs he was noted to have a platelet count of 106,616. BMP showed a creatinine of 1.62. LFTs were within normal limits. He was given IV insulin drip. He is also given normal saline bolus in the ED. We were asked to admit for work up and evaluation of the above problems.      Past Medical History:   Diagnosis Date    B[facility] 1 disorder, depressed ([facility] [address]     [facility]     Depression     Diabetes ([facility]     DKA, type 1 ([facility] 1/27/2013    diagnosed age [address]    H/O noncompliance with medical treatment, presenting hazards to health     MRSA (methicillin resistant staph aureus) culture positive     MRSA (methicillin resistant Staphylococcus aureus)     Face    Noncompliance with medication regimen     Second hand smoke exposure     Seizure ([address]     Seizures ([address] 2006 or 2007    one episode during halfway        Past Surgical History:   Procedure Laterality Date    HX HEENT      top left wisdom tooth    HX ORTHOPAEDIC Left     wrist; [facility]    UPPER GI ENDOSCOPY,BIOPSY  11/20/2018            Social History     Tobacco Use    Smoking status: Current Some Day Smoker     Packs/day: 0.10     Years: 16.00     Pack years: 1.60     Types: Cigarettes    Smokeless tobacco: Never Used   Substance Use Topics    Alcohol use: No        Family History   Problem Relation Age of Onset    Diabetes Mother     Diabetes Other         neice, type 1      No Known Allergies     Prior to Admission medications    Medication Sig Start Date End Date Taking? Authorizing Provider   lisinopriL (PRINIVIL, ZESTRIL) 5 mg tablet Take 1 tablet by mouth once daily 6/30/20   Michelle Winters MD   metoprolol tartrate (LOPRESSOR) 25 mg tablet TAKE 1 TABLET BY MOUTH EVERY 12 HOURS 6/30/20   Michelle Winters MD   ergocalciferol (ERGOCALCIFEROL) 1,250 mcg (50,000 unit) capsule Take 1 Cap by mouth every seven (7) days. 6/3/20   Michelle Winters MD   furosemide (Lasix) 40 mg tablet Take 1 Tab by mouth daily. 6/3/20   Michelle Winters MD   potassium chloride (K-DUR, KLOR-CON) 20 mEq tablet Take 1 Tab by mouth daily. 6/3/20   Michelle Winters MD   insulin glargine (Lantus Solostar U-100 Insulin) 100 unit/mL (3 mL) inpn adminster 30 units subcut daily 6/3/20   Michelle Winters MD   lancets misc Use preferred brand;  Check glucose 4 times daily, Diagnosis E10.65 3/27/20   Naz Houser MD   Blood-Glucose Meter monitoring kit 1 preferred brand glucometer for checking home glucose 3/27/20   Augie Miller MD   glucose blood VI test strips (Pharmacist Choice) strip Use preferred brand; Check glucose 4 times daily, Diagnosis E10.65 3/27/20   Augie Miller MD   flash glucose scanning reader [facility] 14 Day [place]) [facility] [employer] [place] for use with 14 day sensors 3/25/20   Augie Miller MD   flash glucose sensor (FreeStyle Connor 14 Day Sensor) kit 2 (14 day) sensors for use with Freestyle Scanner 3/25/20   Augie Miller MD   Insulin Needles, Disposable, (BD Ultra-Fine Short Pen Needle) 31 gauge x 5/16\" ndle Use with insulin pens 4 times per day 3/25/20   Augie Miller MD   insulin aspart U-100 (NovoLOG Flexpen U-100 Insulin) 100 unit/mL (3 mL) inpn (Novolog or Humalog, whichever more preferred: Inject 5 units with each meal + correction insulin, up to 30 units per day 3/25/20   Augie Miller MD   gemfibroziL (LOPID) 600 mg tablet Take 1 Tab by mouth two (2) times a day. 3/24/20   Carmita Edwards MD   pantoprazole (PROTONIX) 40 mg tablet Take 1 Tab by mouth two (2) times a day. 1/27/20   Easton Krishna MD   tamsulosin (FLOMAX) 0.4 mg capsule Take 1 Cap by mouth daily. 1/6/20   Anna Zepeda MD       REVIEW OF SYSTEMS:     I am not able to complete the review of systems because:    The patient is intubated and sedated    The patient has altered mental status due to his acute medical problems    The patient has baseline aphasia from prior stroke(s)    The patient has baseline dementia and is not reliable historian    The patient is in acute medical distress and unable to provide information           Total of 12 systems reviewed as follows:       POSITIVE= underlined text  Negative = text not underlined  General:  fever, chills, sweats, generalized weakness, weight loss/gain,      loss of appetite   Eyes:    blurred vision, eye pain, loss of vision, double vision  ENT:    rhinorrhea, pharyngitis   Respiratory:   cough, sputum production, SOB, [place], wheezing, pleuritic pain   Cardiology:   chest pain, palpitations, orthopnea, PND, edema, syncope   Gastrointestinal:  abdominal pain , N/V, diarrhea, dysphagia, constipation, bleeding   Genitourinary:  frequency, urgency, dysuria, hematuria, incontinence   Muskuloskeletal :  arthralgia, myalgia, back pain  Hematology:  easy bruising, nose or gum bleeding, lymphadenopathy   Dermatological: rash, ulceration, pruritis, color change / jaundice  Endocrine:   hot flashes or polydipsia   Neurological:  headache, dizziness, confusion, focal weakness, paresthesia,     Speech difficulties, memory loss, gait difficulty  Psychological: Feelings of anxiety, depression, agitation    Objective:   VITALS:    Visit Vitals  BP (!) 150/91   Pulse 92   Temp 98.4 °F (36.9 °C)   Resp 16   Ht 5' 9\" (1.753 m)   Wt 64 kg (141 lb)   SpO2 100%   BMI 20.82 kg/m²       PHYSICAL EXAM:    General:    Alert, cooperative, no distress, appears stated age. HEENT: Atraumatic, anicteric sclerae, pink conjunctivae     No oral ulcers, mucosa moist, throat clear, dentition fair  Neck:  Supple, symmetrical,  thyroid: non tender  Lungs:   Clear to auscultation bilaterally. No Wheezing or Rhonchi. No rales. Chest wall:  No tenderness  No Accessory muscle use. Heart:   Regular  rhythm,  No  murmur   No edema  Abdomen:   Soft, non-tender. Not distended. Bowel sounds normal  Extremities: No cyanosis. No clubbing,      Skin turgor normal, Capillary refill normal, Radial dial pulse 2+  Skin:     Not pale. Not Jaundiced  No rashes   Psych:  Not anxious or agitated. Neurologic: EOMs intact. No facial asymmetry. No aphasia or slurred speech. Symmetrical strength, Sensation grossly intact.  Alert and oriented X 4.     _______________________________________________________________________  Care Plan discussed with:    Comments   Patient Y    Family      RN Y    Care Manager                    Consultant: _______________________________________________________________________  Expected  Disposition:   Home with Family Y   HH/PT/OT/RN    SNF/LTC    CATHIE    ________________________________________________________________________  TOTAL TIME:  61  Minutes    Critical Care Provided     Minutes non procedure based      Comments    Y Reviewed previous records   >50% of visit spent in counseling and coordination of care Y Discussion with patient and/or family and questions answered       ________________________________________________________________________  Signed: Amanda Paz MD    Procedures: see electronic medical records for all procedures/Xrays and details which were not copied into this note but were reviewed prior to creation of Plan. LAB DATA REVIEWED:    Recent Results (from the past 24 hour(s))   GLUCOSE, POC    Collection Time: 08/04/20  7:24 AM   Result Value Ref Range    Glucose (POC) 485 (H) 65 - 100 mg/dL    Performed by Eladia Winston    CBC WITH AUTOMATED DIFF    Collection Time: 08/04/20  7:33 AM   Result Value Ref Range    WBC 10.6 4.1 - 11.1 K/uL    RBC 4.07 (L) 4.10 - 5.70 M/uL    HGB 12.9 12.1 - 17.0 g/dL    HCT 36.7 36.6 - 50.3 %    MCV 90.2 80.0 - 99.0 FL    MCH 31.7 26.0 - 34.0 PG    MCHC 35.1 30.0 - 36.5 g/dL    RDW 13.1 11.5 - 14.5 %    PLATELET 894 (H) 632 - 400 K/uL    MPV 11.0 8.9 - 12.9 FL    NRBC 0.0 0  WBC    ABSOLUTE NRBC 0.00 0.00 - 0.01 K/uL    NEUTROPHILS 81 (H) 32 - 75 %    LYMPHOCYTES 13 12 - 49 %    MONOCYTES 4 (L) 5 - 13 %    EOSINOPHILS 1 0 - 7 %    BASOPHILS 0 0 - 1 %    IMMATURE GRANULOCYTES 1 (H) 0.0 - 0.5 %    ABS. NEUTROPHILS 8.6 (H) 1.8 - 8.0 K/UL    ABS. LYMPHOCYTES 1.4 0.8 - 3.5 K/UL    ABS. MONOCYTES 0.5 0.0 - 1.0 K/UL    ABS. EOSINOPHILS 0.1 0.0 - 0.4 K/UL    ABS. BASOPHILS 0.0 0.0 - 0.1 K/UL    ABS. IMM.  GRANS. 0.1 (H) 0.00 - 0.04 K/UL    DF AUTOMATED     METABOLIC PANEL, COMPREHENSIVE    Collection Time: 08/04/20  7:33 AM   Result Value Ref Range Sodium 132 (L) 136 - 145 mmol/L    Potassium 4.1 3.5 - 5.1 mmol/L    Chloride 90 (L) 97 - 108 mmol/L    CO2 18 (L) 21 - 32 mmol/L    Anion gap 24 (H) 5 - 15 mmol/L    Glucose 466 (H) 65 - 100 mg/dL    BUN 36 (H) 6 - 20 MG/DL    Creatinine 1.62 (H) 0.70 - 1.30 MG/DL    BUN/Creatinine ratio 22 (H) 12 - 20      GFR est AA 58 (L) >60 ml/min/1.73m2    GFR est non-AA 48 (L) >60 ml/min/1.73m2    Calcium 9.5 8.5 - 10.1 MG/DL    Bilirubin, total 0.5 0.2 - 1.0 MG/DL    ALT (SGPT) 21 12 - 78 U/L    AST (SGOT) 14 (L) 15 - 37 U/L    Alk.  phosphatase 132 (H) 45 - 117 U/L    Protein, total 7.7 6.4 - 8.2 g/dL    Albumin 3.4 (L) 3.5 - 5.0 g/dL    Globulin 4.3 (H) 2.0 - 4.0 g/dL    A-G Ratio 0.8 (L) 1.1 - 2.2     GLUCOSE, POC    Collection Time: 08/04/20 10:42 AM   Result Value Ref Range    Glucose (POC) 389 (H) 65 - 100 mg/dL    Performed by Shereen President W/ REFLEX CULTURE    Collection Time: 08/04/20 10:49 AM    Specimen: Urine   Result Value Ref Range    Color YELLOW/STRAW      Appearance CLEAR CLEAR      Specific gravity 1.025 1.003 - 1.030      pH (UA) 5.5 5.0 - 8.0      Protein 100 (A) NEG mg/dL    Glucose >1,000 (A) NEG mg/dL    Ketone >80 (A) NEG mg/dL    Bilirubin Negative NEG      Blood TRACE (A) NEG      Urobilinogen 0.2 0.2 - 1.0 EU/dL    Nitrites Negative NEG      Leukocyte Esterase Negative NEG      WBC 0-4 0 - 4 /hpf    RBC 5-10 0 - 5 /hpf    Epithelial cells FEW FEW /lpf    Bacteria Negative NEG /hpf    UA:UC IF INDICATED CULTURE NOT INDICATED BY UA RESULT CNI      Hyaline cast 0-2 0 - 5 /lpf   METABOLIC PANEL, BASIC    Collection Time: 08/04/20 11:04 AM   Result Value Ref Range    Sodium 137 136 - 145 mmol/L    Potassium 3.6 3.5 - 5.1 mmol/L    Chloride 101 97 - 108 mmol/L    CO2 16 (L) 21 - 32 mmol/L    Anion gap 20 (H) 5 - 15 mmol/L    Glucose 357 (H) 65 - 100 mg/dL    BUN 36 (H) 6 - 20 MG/DL    Creatinine 1.46 (H) 0.70 - 1.30 MG/DL    BUN/Creatinine ratio 25 (H) 12 - 20      GFR est AA >60 >60 ml/min/1.73m2    GFR est non-AA 54 (L) >60 ml/min/1.73m2    Calcium 8.5 8.5 - 10.1 MG/DL

## 2020-08-04 NOTE — ED NOTES
Bedside and verbal shift report received from [place], [facility]. Assumed care of pt at this time. Pt resting in NAD. VSS. Denies any needs at this time.

## 2020-08-04 NOTE — PROGRESS NOTES
Care Manager attempted to see patient and he presents in ER room 10 on stretcher with blanket over his head. Nurse stated that he does not want to talk with anyone at this time. No family present at this time. Will attempt to see later.     Alexandria Bradshaw, RN  Care Manager  Ext [phone]

## 2020-08-04 NOTE — ED PROVIDER NOTES
EMERGENCY DEPARTMENT HISTORY AND PHYSICAL EXAM      Date: 8/4/2020  Patient Name: Leonila Langston    History of Presenting Illness     Chief Complaint   Patient presents with    Vomiting     pt started vomiting blood early this morning. pt is type I DM who does not check his sugars regularly. He took 30 units of Lantus at 0200 this morning.  High Blood Sugar       History Provided By: Patient    HPI: Leonila Langston, 42 y.o. male with PMHx significant for bipolar disorder, depression, diabetes who presents with a chief complaint of nausea, vomiting, and abdominal pain for 2 days. Patient reports he has been taking his insulin but has not been checking his blood sugar. Does report some blood in his vomit. Denies any chest pain, shortness of breath, diarrhea, urinary symptoms. PCP: Sharri Roman MD    There are no other complaints, changes, or physical findings at this time. Current Outpatient Medications   Medication Sig Dispense Refill    lisinopriL (PRINIVIL, ZESTRIL) 5 mg tablet Take 1 tablet by mouth once daily 30 Tab 0    metoprolol tartrate (LOPRESSOR) 25 mg tablet TAKE 1 TABLET BY MOUTH EVERY 12 HOURS 60 Tab 0    ergocalciferol (ERGOCALCIFEROL) 1,250 mcg (50,000 unit) capsule Take 1 Cap by mouth every seven (7) days. 4 Cap 3    furosemide (Lasix) 40 mg tablet Take 1 Tab by mouth daily. 30 Tab 1    potassium chloride (K-DUR, KLOR-CON) 20 mEq tablet Take 1 Tab by mouth daily. 30 Tab 1    insulin glargine (Lantus Solostar U-100 Insulin) 100 unit/mL (3 mL) inpn adminster 30 units subcut daily 10 Adjustable Dose Pre-filled Pen Syringe 3    lancets misc Use preferred brand; Check glucose 4 times daily, Diagnosis E10.65 400 Each 3    Blood-Glucose Meter monitoring kit 1 preferred brand glucometer for checking home glucose 1 Kit 0    glucose blood VI test strips (Pharmacist Choice) strip Use preferred brand;  Check glucose 4 times daily, Diagnosis E10.65 400 Strip 3    flash glucose scanning reader (FreeStyle Connor 14 Day Ashley) [facility] Pati Ashley for use with 14 day sensors 1 Each 0    flash glucose sensor (FreeStyle Connor 14 Day Sensor) kit 2 (14 day) sensors for use with Freestyle Scanner 2 Kit 7    Insulin Needles, Disposable, (BD Ultra-Fine Short Pen Needle) 31 gauge x 5/16\" ndle Use with insulin pens 4 times per day 400 Pen Needle 3    insulin aspart U-100 (NovoLOG Flexpen U-100 Insulin) 100 unit/mL (3 mL) inpn (Novolog or Humalog, whichever more preferred: Inject 5 units with each meal + correction insulin, up to 30 units per day 10 Adjustable Dose Pre-filled Pen Syringe 3    gemfibroziL (LOPID) 600 mg tablet Take 1 Tab by mouth two (2) times a day. 60 Tab 3    pantoprazole (PROTONIX) 40 mg tablet Take 1 Tab by mouth two (2) times a day. 60 Tab 0    tamsulosin (FLOMAX) 0.4 mg capsule Take 1 Cap by mouth daily.  30 Cap 1     Past History     Past Medical History:  Past Medical History:   Diagnosis Date    Bipolar 1 disorder, depressed ([address]     Bipolar disorder ([address]     Depression     Diabetes ([address]     DKA, type 1 ([address] 1/27/2013    diagnosed age 23    H/O noncompliance with medical treatment, presenting hazards to health     MRSA (methicillin resistant staph aureus) culture positive     MRSA (methicillin resistant Staphylococcus aureus)     Face    Noncompliance with medication regimen     Second hand smoke exposure     Seizure ([address]     Seizures ([address] 2006 or 2007    one episode during snf     Past Surgical History:  Past Surgical History:   Procedure Laterality Date    HX HEENT      top left wisdom tooth    HX ORTHOPAEDIC Left     wrist; MCV    UPPER GI ENDOSCOPY,BIOPSY  11/20/2018          Family History:  Family History   Problem Relation Age of Onset    Diabetes Mother     Diabetes Other         neice, type 1      Social History:  Social History     Tobacco Use    Smoking status: Current Some Day Smoker     Packs/day: 0.10     Years: 16.00 Pack years: 1.60     Types: Cigarettes    Smokeless tobacco: Never Used   Substance Use Topics    Alcohol use: No    Drug use: No     Allergies:  No Known Allergies  Review of Systems   Review of Systems   Constitutional: Negative for chills and fever. HENT: Negative for congestion, rhinorrhea and sore throat. Respiratory: Negative for cough and shortness of breath. Cardiovascular: Negative for chest pain. Gastrointestinal: Positive for abdominal pain, nausea and vomiting. Genitourinary: Negative for dysuria and urgency. Skin: Negative for rash. Neurological: Negative for dizziness, light-headedness and headaches. All other systems reviewed and are negative. Physical Exam   Physical Exam  Vitals signs and nursing note reviewed. Constitutional:       General: He is not in acute distress. Appearance: He is well-developed. HENT:      Head: Normocephalic and atraumatic. Eyes:      Conjunctiva/sclera: Conjunctivae normal.      Pupils: Pupils are equal, round, and reactive to light. Neck:      Musculoskeletal: Normal range of motion. Cardiovascular:      Rate and Rhythm: Normal rate and regular rhythm. Pulmonary:      Effort: Pulmonary effort is normal. No respiratory distress. Breath sounds: Normal breath sounds. No stridor. Abdominal:      General: There is no distension. Palpations: Abdomen is soft. Tenderness: There is no abdominal tenderness. Musculoskeletal: Normal range of motion. Skin:     General: Skin is warm and dry. Neurological:      Mental Status: He is alert and oriented to person, place, and time.        Diagnostic Study Results   Labs -     Recent Results (from the past 12 hour(s))   GLUCOSE, POC    Collection Time: 08/04/20  7:24 AM   Result Value Ref Range    Glucose (POC) 485 (H) 65 - 100 mg/dL    Performed by Natalia Shaw    CBC WITH AUTOMATED DIFF    Collection Time: 08/04/20  7:33 AM   Result Value Ref Range    WBC 10.6 4.1 - 11.1 K/uL    RBC 4.07 (L) 4.10 - 5.70 M/uL    HGB 12.9 12.1 - 17.0 g/dL    HCT 36.7 36.6 - 50.3 %    MCV 90.2 80.0 - 99.0 FL    MCH 31.7 26.0 - 34.0 PG    MCHC 35.1 30.0 - 36.5 g/dL    RDW 13.1 11.5 - 14.5 %    PLATELET 661 (H) 997 - 400 K/uL    MPV 11.0 8.9 - 12.9 FL    NRBC 0.0 0  WBC    ABSOLUTE NRBC 0.00 0.00 - 0.01 K/uL    NEUTROPHILS 81 (H) 32 - 75 %    LYMPHOCYTES 13 12 - 49 %    MONOCYTES 4 (L) 5 - 13 %    EOSINOPHILS 1 0 - 7 %    BASOPHILS 0 0 - 1 %    IMMATURE GRANULOCYTES 1 (H) 0.0 - 0.5 %    ABS. NEUTROPHILS 8.6 (H) 1.8 - 8.0 K/UL    ABS. LYMPHOCYTES 1.4 0.8 - 3.5 K/UL    ABS. MONOCYTES 0.5 0.0 - 1.0 K/UL    ABS. EOSINOPHILS 0.1 0.0 - 0.4 K/UL    ABS. BASOPHILS 0.0 0.0 - 0.1 K/UL    ABS. IMM. GRANS. 0.1 (H) 0.00 - 0.04 K/UL    DF AUTOMATED     METABOLIC PANEL, COMPREHENSIVE    Collection Time: 08/04/20  7:33 AM   Result Value Ref Range    Sodium 132 (L) 136 - 145 mmol/L    Potassium 4.1 3.5 - 5.1 mmol/L    Chloride 90 (L) 97 - 108 mmol/L    CO2 18 (L) 21 - 32 mmol/L    Anion gap 24 (H) 5 - 15 mmol/L    Glucose 466 (H) 65 - 100 mg/dL    BUN 36 (H) 6 - 20 MG/DL    Creatinine 1.62 (H) 0.70 - 1.30 MG/DL    BUN/Creatinine ratio 22 (H) 12 - 20      GFR est AA 58 (L) >60 ml/min/1.73m2    GFR est non-AA 48 (L) >60 ml/min/1.73m2    Calcium 9.5 8.5 - 10.1 MG/DL    Bilirubin, total 0.5 0.2 - 1.0 MG/DL    ALT (SGPT) 21 12 - 78 U/L    AST (SGOT) 14 (L) 15 - 37 U/L    Alk. phosphatase 132 (H) 45 - 117 U/L    Protein, total 7.7 6.4 - 8.2 g/dL    Albumin 3.4 (L) 3.5 - 5.0 g/dL    Globulin 4.3 (H) 2.0 - 4.0 g/dL    A-G Ratio 0.8 (L) 1.1 - 2.2         Radiologic Studies -   No orders to display     No results found. Medical Decision Making   I am the first provider for this patient. I reviewed the vital signs, available nursing notes, past medical history, past surgical history, family history and social history. Vital Signs-Reviewed the patient's vital signs.   Patient Vitals for the past 12 hrs:   Temp Pulse Resp BP SpO2   08/04/20 1015 -- -- -- (!) 150/91 100 %   08/04/20 1000 -- -- -- 133/83 100 %   08/04/20 0945 -- -- -- 125/78 99 %   08/04/20 0720 98.4 °F (36.9 °C) 92 16 (!) 157/93 98 %       Pulse Oximetry Analysis - 100% on ra    Cardiac Monitor:   Rate: 92 bpm  Rhythm: Normal Sinus Rhythm          Records Reviewed: Nursing Notes and Old Medical Records    Provider Notes (Medical Decision Making):   Patient presents with nausea, vomiting, and abdominal pain. Blood sugar in the 400s on arrival.  Suspect symptoms likely related to hyperglycemia. Will check basic lab work, urinalysis, give IV fluids and IV insulin. Will give nausea medication reevaluate. ED Course:   Initial assessment performed. The patients presenting problems have been discussed, and they are in agreement with the care plan formulated and outlined with them. I have encouraged them to ask questions as they arise throughout their visit. ED Course as of Aug 05 1201   Tue Aug 04, 2020   1041 Patient signed out to Dr. Libby Pollard. Disposition pending reevaluation after IV fluids and nausea medication. Lab work showed a very mild DKA so will repeat BMP after fluids and insulin. [ED]   0356 Patient signed out me by Dr. Bear Hodges. Received 2 L of IV saline and 10 of IV insulin, repeat metabolic panel showed worsening acidosis and persistent anion gap's will place on insulin drip and admit to the hospitalist for further management.     [BN]      ED Course User Index  [BN] MD Nedra Fernandez MD       Procedures:  Procedures    Critical Care:  CRITICAL CARE NOTE :  IMPENDING DETERIORATION -Airway and Metabolic  ASSOCIATED RISK FACTORS - Metabolic changes and Dehydration  MANAGEMENT- Bedside Assessment  INTERPRETATION -  Labs   INTERVENTIONS - Metobolic interventions  CASE REVIEW - Hospitalist  TREATMENT RESPONSE -Improved  PERFORMED BY - Self    NOTES   :    I have spent 40 minutes of critical care time involved in lab review, consultations with specialist, family decision- making, bedside attention and documentation. During this entire length of time I was immediately available to the patient . Contreras Avila MD      Disposition:    Admission Note:  Patient is being admitted to the hospital by Hospitalist.  The results of their tests and reasons for their admission have been discussed with them and available family. They convey agreement and understanding for the need to be admitted and for their admission diagnosis. Diagnosis     Clinical Impression:   1. Diabetic ketoacidosis without coma associated with type 1 diabetes mellitus ([facility] [address] [facility]        This note will not be viewable in [employer]. Please note that this dictation was completed with [employer], the computer voice recognition software. Quite often unanticipated grammatical, syntax, homophones, and other interpretive errors are inadvertently transcribed by the computer software. Please disregard these errors.   Please excuse any errors that have escaped final proofreading

## 2020-08-04 NOTE — PROGRESS NOTES
1534: TRANSFER - IN REPORT:    Verbal report received from Chen Edge RN(name) on Zeenat Share  being received from ED(unit) for routine progression of care      Report consisted of patients Situation, Background, Assessment and   Recommendations(SBAR). Information from the following report(s) SBAR, Kardex, Intake/Output and MAR was reviewed with the receiving nurse. Opportunity for questions and clarification was provided. Assessment completed upon patients arrival to unit and care assumed. 1608: Patient arrived on floor. Patient ambulated to the bed with steady gait. On Insulin drip. Call bell in reach, will monitor. [phone]: Primary Nurse Le Tenorio and Misa Lyles RN performed a dual skin assessment on this patient No impairment noted  Corey score is 21    1900: Bedside shift change report given to ASAD Mejia (oncoming nurse) by Conor Colorado RN (offgoing nurse). Report included the following information SBAR, Kardex, Intake/Output and MAR.

## 2020-08-04 NOTE — ED NOTES
TRANSFER - OUT REPORT:    Verbal report given to Nadya Kelsey (name) on Ayesha Lewis  being transferred to PCU (unit) for routine progression of care       Report consisted of patients Situation, Background, Assessment and   Recommendations(SBAR). Information from the following report(s) SBAR, Kardex, ED Summary, MAR, Recent Results and Cardiac Rhythm sinus tachycardia was reviewed with the receiving nurse. Lines:   Peripheral IV 08/04/20 Left Antecubital (Active)       Peripheral IV 08/04/20 (Active)       Peripheral IV 08/04/20 Left Antecubital (Active)   Site Assessment Clean, dry, & intact 08/04/20 1520   Phlebitis Assessment 0 08/04/20 1520   Infiltration Assessment 0 08/04/20 1520   Dressing Status Clean, dry, & intact 08/04/20 1520   Hub Color/Line Status Pink 08/04/20 1520   Action Taken Catheter retaped 08/04/20 1520        Opportunity for questions and clarification was provided.       Patient transported with:   Monitor  Registered Nurse   Oncoming RN notified patient on insulin drip next blood glucose 1545

## 2020-08-05 ENCOUNTER — APPOINTMENT (OUTPATIENT)
Dept: GENERAL RADIOLOGY | Age: 40
DRG: 420 | End: 2020-08-05
Attending: INTERNAL MEDICINE
Payer: COMMERCIAL

## 2020-08-05 LAB
ADMINISTERED INITIALS, ADMINIT: NORMAL
ANION GAP SERPL CALC-SCNC: 8 MMOL/L (ref 5–15)
BASOPHILS # BLD: 0 K/UL (ref 0–0.1)
BASOPHILS NFR BLD: 0 % (ref 0–1)
BUN SERPL-MCNC: 31 MG/DL (ref 6–20)
BUN/CREAT SERPL: 25 (ref 12–20)
CALCIUM SERPL-MCNC: 8.2 MG/DL (ref 8.5–10.1)
CHLORIDE SERPL-SCNC: 108 MMOL/L (ref 97–108)
CO2 SERPL-SCNC: 29 MMOL/L (ref 21–32)
CREAT SERPL-MCNC: 1.24 MG/DL (ref 0.7–1.3)
D50 ADMINISTERED, D50ADM: 0 ML
D50 ORDER, D50ORD: 0 ML
DIFFERENTIAL METHOD BLD: ABNORMAL
EOSINOPHIL # BLD: 0 K/UL (ref 0–0.4)
EOSINOPHIL NFR BLD: 0 % (ref 0–7)
ERYTHROCYTE [DISTWIDTH] IN BLOOD BY AUTOMATED COUNT: 13.2 % (ref 11.5–14.5)
EST. AVERAGE GLUCOSE BLD GHB EST-MCNC: ABNORMAL MG/DL
GLSCOM COMMENTS: NORMAL
GLUCOSE BLD STRIP.AUTO-MCNC: 106 MG/DL (ref 65–100)
GLUCOSE BLD STRIP.AUTO-MCNC: 110 MG/DL (ref 65–100)
GLUCOSE BLD STRIP.AUTO-MCNC: 115 MG/DL (ref 65–100)
GLUCOSE BLD STRIP.AUTO-MCNC: 124 MG/DL (ref 65–100)
GLUCOSE BLD STRIP.AUTO-MCNC: 143 MG/DL (ref 65–100)
GLUCOSE BLD STRIP.AUTO-MCNC: 82 MG/DL (ref 65–100)
GLUCOSE BLD STRIP.AUTO-MCNC: 82 MG/DL (ref 65–100)
GLUCOSE BLD STRIP.AUTO-MCNC: 87 MG/DL (ref 65–100)
GLUCOSE BLD STRIP.AUTO-MCNC: 88 MG/DL (ref 65–100)
GLUCOSE BLD STRIP.AUTO-MCNC: 94 MG/DL (ref 65–100)
GLUCOSE BLD STRIP.AUTO-MCNC: 95 MG/DL (ref 65–100)
GLUCOSE SERPL-MCNC: 92 MG/DL (ref 65–100)
GLUCOSE, GLC: 110 MG/DL
GLUCOSE, GLC: 82 MG/DL
GLUCOSE, GLC: 82 MG/DL
GLUCOSE, GLC: 87 MG/DL
GLUCOSE, GLC: 88 MG/DL
GLUCOSE, GLC: 94 MG/DL
GLUCOSE, GLC: 95 MG/DL
HBA1C MFR BLD: >14 % (ref 4–5.6)
HCT VFR BLD AUTO: 30.2 % (ref 36.6–50.3)
HGB BLD-MCNC: 10.6 G/DL (ref 12.1–17)
HIGH TARGET, HITG: 250 MG/DL
IMM GRANULOCYTES # BLD AUTO: 0.1 K/UL (ref 0–0.04)
IMM GRANULOCYTES NFR BLD AUTO: 0 % (ref 0–0.5)
INSULIN ADMINSTERED, INSADM: 0 UNITS/HOUR
INSULIN ORDER, INSORD: 0 UNITS/HOUR
LOW TARGET, LOT: 150 MG/DL
LYMPHOCYTES # BLD: 1.3 K/UL (ref 0.8–3.5)
LYMPHOCYTES NFR BLD: 11 % (ref 12–49)
MAGNESIUM SERPL-MCNC: 2.3 MG/DL (ref 1.6–2.4)
MCH RBC QN AUTO: 31.1 PG (ref 26–34)
MCHC RBC AUTO-ENTMCNC: 35.1 G/DL (ref 30–36.5)
MCV RBC AUTO: 88.6 FL (ref 80–99)
MINUTES UNTIL NEXT BG, NBG: 60 MIN
MONOCYTES # BLD: 1.1 K/UL (ref 0–1)
MONOCYTES NFR BLD: 9 % (ref 5–13)
MULTIPLIER, MUL: 0
NEUTS SEG # BLD: 9.5 K/UL (ref 1.8–8)
NEUTS SEG NFR BLD: 80 % (ref 32–75)
NRBC # BLD: 0 K/UL (ref 0–0.01)
NRBC BLD-RTO: 0 PER 100 WBC
ORDER INITIALS, ORDINIT: NORMAL
PLATELET # BLD AUTO: 372 K/UL (ref 150–400)
PMV BLD AUTO: 10.3 FL (ref 8.9–12.9)
POTASSIUM SERPL-SCNC: 3.3 MMOL/L (ref 3.5–5.1)
RBC # BLD AUTO: 3.41 M/UL (ref 4.1–5.7)
SERVICE CMNT-IMP: ABNORMAL
SERVICE CMNT-IMP: NORMAL
SODIUM SERPL-SCNC: 145 MMOL/L (ref 136–145)
WBC # BLD AUTO: 12 K/UL (ref 4.1–11.1)

## 2020-08-05 PROCEDURE — 83036 HEMOGLOBIN GLYCOSYLATED A1C: CPT

## 2020-08-05 PROCEDURE — 74011250637 HC RX REV CODE- 250/637: Performed by: INTERNAL MEDICINE

## 2020-08-05 PROCEDURE — 36415 COLL VENOUS BLD VENIPUNCTURE: CPT

## 2020-08-05 PROCEDURE — 74011636637 HC RX REV CODE- 636/637: Performed by: INTERNAL MEDICINE

## 2020-08-05 PROCEDURE — 74011000250 HC RX REV CODE- 250: Performed by: INTERNAL MEDICINE

## 2020-08-05 PROCEDURE — 83735 ASSAY OF MAGNESIUM: CPT

## 2020-08-05 PROCEDURE — 82962 GLUCOSE BLOOD TEST: CPT

## 2020-08-05 PROCEDURE — 65660000000 HC RM CCU STEPDOWN

## 2020-08-05 PROCEDURE — 80048 BASIC METABOLIC PNL TOTAL CA: CPT

## 2020-08-05 PROCEDURE — 74011250636 HC RX REV CODE- 250/636: Performed by: INTERNAL MEDICINE

## 2020-08-05 PROCEDURE — 71045 X-RAY EXAM CHEST 1 VIEW: CPT

## 2020-08-05 PROCEDURE — 85025 COMPLETE CBC W/AUTO DIFF WBC: CPT

## 2020-08-05 PROCEDURE — C9113 INJ PANTOPRAZOLE SODIUM, VIA: HCPCS | Performed by: INTERNAL MEDICINE

## 2020-08-05 RX ORDER — DEXTROSE 50 % IN WATER (D50W) INTRAVENOUS SYRINGE
25-50 AS NEEDED
Status: DISCONTINUED | OUTPATIENT
Start: 2020-08-05 | End: 2020-08-06 | Stop reason: HOSPADM

## 2020-08-05 RX ORDER — INSULIN LISPRO 100 [IU]/ML
INJECTION, SOLUTION INTRAVENOUS; SUBCUTANEOUS
Status: DISCONTINUED | OUTPATIENT
Start: 2020-08-05 | End: 2020-08-06 | Stop reason: HOSPADM

## 2020-08-05 RX ORDER — MAGNESIUM SULFATE 100 %
4 CRYSTALS MISCELLANEOUS AS NEEDED
Status: DISCONTINUED | OUTPATIENT
Start: 2020-08-05 | End: 2020-08-06 | Stop reason: HOSPADM

## 2020-08-05 RX ADMIN — SODIUM CHLORIDE 40 MG: 9 INJECTION, SOLUTION INTRAMUSCULAR; INTRAVENOUS; SUBCUTANEOUS at 08:13

## 2020-08-05 RX ADMIN — GEMFIBROZIL 600 MG: 600 TABLET ORAL at 08:13

## 2020-08-05 RX ADMIN — ONDANSETRON 4 MG: 2 INJECTION INTRAMUSCULAR; INTRAVENOUS at 08:27

## 2020-08-05 RX ADMIN — PROCHLORPERAZINE EDISYLATE 5 MG: 5 INJECTION INTRAMUSCULAR; INTRAVENOUS at 15:42

## 2020-08-05 RX ADMIN — METOPROLOL TARTRATE 25 MG: 25 TABLET, FILM COATED ORAL at 18:34

## 2020-08-05 RX ADMIN — ONDANSETRON 4 MG: 2 INJECTION INTRAMUSCULAR; INTRAVENOUS at 02:50

## 2020-08-05 RX ADMIN — SODIUM CHLORIDE 40 MG: 9 INJECTION, SOLUTION INTRAMUSCULAR; INTRAVENOUS; SUBCUTANEOUS at 22:06

## 2020-08-05 RX ADMIN — INSULIN LISPRO 2 UNITS: 100 INJECTION, SOLUTION INTRAVENOUS; SUBCUTANEOUS at 22:15

## 2020-08-05 RX ADMIN — ONDANSETRON 4 MG: 2 INJECTION INTRAMUSCULAR; INTRAVENOUS at 18:29

## 2020-08-05 RX ADMIN — GEMFIBROZIL 600 MG: 600 TABLET ORAL at 18:34

## 2020-08-05 RX ADMIN — POTASSIUM CHLORIDE 20 MEQ: 20 TABLET, EXTENDED RELEASE ORAL at 08:13

## 2020-08-05 RX ADMIN — TAMSULOSIN HYDROCHLORIDE 0.4 MG: 0.4 CAPSULE ORAL at 08:13

## 2020-08-05 RX ADMIN — METOPROLOL TARTRATE 25 MG: 25 TABLET, FILM COATED ORAL at 08:13

## 2020-08-05 RX ADMIN — SODIUM CHLORIDE 125 ML/HR: 900 INJECTION, SOLUTION INTRAVENOUS at 05:21

## 2020-08-05 RX ADMIN — PROCHLORPERAZINE EDISYLATE 5 MG: 5 INJECTION INTRAMUSCULAR; INTRAVENOUS at 22:07

## 2020-08-05 NOTE — PROGRESS NOTES
Hospitalist Progress Note    NAME: Erich Balderrama   :  1980   MRN:  [id number]       Assessment / Plan:  Diabetic ketoacidosis unclear precipitant  -resolved   -still complaining from nausea   -d/c insulin IV   -start on sliding scale   -start on lantus   -- d/c iv fluid     Nausea vomiting and abdominal pain likely in the setting of DKA    -Check FOBT   -Start Protonix 40 mg IV twice daily  -we ll Consider further work-up if symptoms are persistent     Acute kidney injury likely prerenal resolved   -D/C  IV fluids. UA is normal  -Hold all nephrotoxic medications including Lasix, lisinopril       Hypertension  -Continue metoprolol. Hold lisinopril     Dyslipidemia  History of GERD  BPH ?  -Continue gemfibrozil  -Continue Protonix but changed to IV  -Continue home Flomax    Body mass index is 20.82 kg/m². Code status: Full  Prophylaxis: SCD's  Recommended Disposition: Home w/Family     Subjective:     Chief Complaint / Reason for Physician Visit  Blood sugar better , gap close , still complained from nausea     Review of Systems:  Symptom Y/N Comments  Symptom Y/N Comments   Fever/Chills n   Chest Pain n    Poor Appetite n   Edema n    Cough n   Abdominal Pain n    Sputum n   Joint Pain n    SOB/JOHNSTON n   Pruritis/Rash     Nausea/vomit y   Tolerating PT/OT     Diarrhea n   Tolerating Diet     Constipation n   Other       Could NOT obtain due to:      Objective:     VITALS:   Last 24hrs VS reviewed since prior progress note.  Most recent are:  Patient Vitals for the past 24 hrs:   Temp Pulse Resp BP SpO2   20 1114 97.8 °F (36.6 °C) 89 16 150/90 100 %   20 0721 98.3 °F (36.8 °C) 94 18 (!) 167/92 100 %   20 0400 -- 90 -- -- --   20 0309 98.2 °F (36.8 °C) 90 19 148/76 99 %   20 0000 -- 95 -- -- --   20 2308 98.4 °F (36.9 °C) 95 18 (!) 142/98 100 %   20 -- 92 -- -- --   20 1933 98.2 °F (36.8 °C) 92 17 143/75 100 %   20 1608 98 °F (36.7 °C) (!) 113 18 154/86 100 %   08/04/20 1530 98.7 °F (37.1 °C) (!) 106 13 136/79 99 %       Intake/Output Summary (Last 24 hours) at 8/5/2020 1437  Last data filed at 8/5/2020 1200  Gross per 24 hour   Intake 3332.78 ml   Output 1100 ml   Net 2232.78 ml        PHYSICAL EXAM:  General: WD, WN. Alert, cooperative, no acute distress    EENT:  EOMI. Anicteric sclerae. MMM  Resp:  CTA bilaterally, no wheezing or rales. No accessory muscle use  CV:  Regular  rhythm,  No edema  GI:  Soft, Non distended, Non tender.  +Bowel sounds  Neurologic:  Alert and oriented X 3, normal speech,   Psych:   Good insight. Not anxious nor agitated  Skin:  No rashes. No jaundice    Reviewed most current lab test results and cultures  YES  Reviewed most current radiology test results   YES  Review and summation of old records today    NO  Reviewed patient's current orders and MAR    YES  PMH/SH reviewed - no change compared to H&P  ________________________________________________________________________  Care Plan discussed with:    Comments   Patient y    Family      RN y    Care Manager     Consultant                       y Multidiciplinary team rounds were held today with , nursing, pharmacist and clinical coordinator. Patient's plan of care was discussed; medications were reviewed and discharge planning was addressed. ________________________________________________________________________  Total NON critical care TIME:  35   Minutes    Total CRITICAL CARE TIME Spent:   Minutes non procedure based      Comments   >50% of visit spent in counseling and coordination of care x    ________________________________________________________________________  Geri Celaya MD     Procedures: see electronic medical records for all procedures/Xrays and details which were not copied into this note but were reviewed prior to creation of Plan. LABS:  I reviewed today's most current labs and imaging studies.   Pertinent labs include:  Recent Labs     08/05/20  0256 08/04/20  0733   WBC 12.0* 10.6   HGB 10.6* 12.9   HCT 30.2* 36.7    442*     Recent Labs     08/05/20  0411 08/04/20 2050 08/04/20  1535 08/04/20  1104  08/04/20  0733    144 140 137  --  132*   K 3.3* 3.3* 3.4* 3.6  --  4.1    108 103 101  --  90*   CO2 29 31 28 16*  --  18*   GLU 92 87 250* 357*  --  466*   BUN 31* 36* 37* 36*  --  36*   CREA 1.24 1.40* 1.60* 1.46*  --  1.62*   CA 8.2* 8.6 8.2* 8.5  --  9.5   MG 2.3 2.4 2.3 2.3   < >  --    PHOS  --   --   --  4.0  --   --    ALB  --   --   --   --   --  3.4*   TBILI  --   --   --   --   --  0.5   ALT  --   --   --   --   --  21    < > = values in this interval not displayed. Signed:  Joaquín Chaidez MD

## 2020-08-05 NOTE — PROGRESS NOTES
1915- Bedside shift change report given to Yara Bashir, ASAD (oncoming nurse) by Juliane Gu RN (offgoing nurse). Report included the following information SBAR, Kardex, ED Summary, Procedure Summary, Intake/Output, MAR, Recent Results, Med Rec Status and Cardiac Rhythm Sinus Tach. 1935- Pt , insulin drip rate set to 0.7 per glucostabilizer. 2040- Pt BG 96, insulin drip rate held per glucostabilizer. Pt complaining of nausea, PRN zofran given. BMP drawn. 2148- Pt BG 86, insulin drip held per glucostabilizer. 2254- Pt BG 70, insulin drip rate held per glucostabilizer. 12mL of D50 given via IV push. Will recheck glucose level in 15 min. 2315- Pt , insulin drip held to 0.0 per glocostabilizer. 0025- Pt BG 87, insulin drip held per glucostabilizer. 0142- Pt BG 82, insulin drip held per glucostabilizer. 0249- Pt BG 82, insulin drip held per glucostabilizer. 0255- Pt complaining of nausea with some vomiting. PRN zofran given per mar. 0410- Pt BG 88, insulin drip held per glucostabilizer. 0519- Pt BG 94, insulin drip held per glucostabilizer. 0413- Pt BG 95, insulin drip held per glucostabilizer. 2607- Bedside shift change report given to Ayse uBsby (oncoming nurse) by Yara Bashir RN (offgoing nurse). Report included the following information SBAR, Kardex, Procedure Summary, Intake/Output, MAR, Recent Results, Med Rec Status and Cardiac Rhythm NSR.

## 2020-08-05 NOTE — PROGRESS NOTES
MELL:  1) Home with FU appts  2) Girlfriend to transport at d/c    Reason for Admission:   DKA                  RUR Score:  22 MOD                PCP: First and Last name:  Mckenzie Marsh   Name of Practice: [facility]   Are you a current patient: Yes/No: Yes   Approximate date of last visit: 4 months ago   Can you participate in a virtual visit if needed: Yes    Do you (patient/family) have any concerns for transition/discharge? None identified              Plan for utilizing home health:   [facility] not appropriate at this time    Current Advanced Directive/Advance Care Plan:  None on file. Pt is not interested in completing. Transition of Care Plan:          Pt is a 42year old  male admitted with DKA. Pt was alert and oriented when speaking to CM. Pt verified demographics, emergency contact, and PCP. Pt also sees Maricarmen Turcios. [facility] endocrin[facility]. Pt lives with his mother in a trailer with 2 steps to get in. Pt is independent with ADLs and uses no DME. Pt has not had HH or SNF. Pt still drives but his girlfriend will transport at discharge. Pt use the [address] in [place] and has no problems affording his medications. Pt reports having good support in his family and identified no concerns for discharge at this time. Pt will likely discharge with FU appts. CM will continue to follow pt and assist with any needs he may have during his stay. Care Management Interventions  PCP Verified by CM: Yes  Mode of Transport at Discharge:  Other (see comment)(girlfriend)  Transition of Care Consult (CM Consult): Discharge Planning  Discharge Durable Medical Equipment: No  Physical Therapy Consult: No  Occupational Therapy Consult: No  Speech Therapy Consult: No  Current Support Network: Relative's Home, Other(Lives with mother)  Confirm Follow Up Transport: Self  The Patient and/or Patient Representative was Provided with a Choice of Provider and Agrees with the Discharge Plan?: No  Freedom of Choice List was Provided with Basic Dialogue that Supports the Patient's Individualized Plan of Care/Goals, Treatment Preferences and Shares the Quality Data Associated with the Providers?: No    Tyshawn Eldridge RN ,[facility]   [phone]

## 2020-08-05 NOTE — PROGRESS NOTES
1915- Bedside shift change report given to Yahaira Moreno RN (oncoming nurse) by Mine Leiva RN (offgoing nurse). Report included the following information SBAR, Kardex, Intake/Output, MAR, Recent Results, Med Rec Status and Cardiac Rhythm NSR.     1945- Pt resting comfortably in bed. No complaints of pain, VSS. Assessment completed. Pt complaining of some nausea. 2215- Pt complaining of nausea, PRN compazine given per MAR. Pt , 2 units SSI given for .    0345- Pt labs drawn. Pt resting comfortably with nausea but improved from earlier in the night. Pt given PRN zofran. 0710- Bedside shift change report given to Pierre Harris RN (oncoming nurse) by Yahaira Moreno RN (offgoing nurse). Report included the following information SBAR, Kardex, Procedure Summary, Intake/Output, MAR, Recent Results, Med Rec Status and Cardiac Rhythm NSR.

## 2020-08-05 NOTE — PROGRESS NOTES
1500: Bedside shift change report given to Norberto Thompson (oncoming nurse) by Macy Marshall (offgoing nurse). Report included the following information SBAR, Kardex, ED Summary, Procedure Summary, Intake/Output, MAR, Recent Results, Med Rec Status and Cardiac Rhythm nsr.     1520: Patient complaining of nausea. Gave prn compazine and patient states that his nausea is now relieved. 1900: Bedside shift change report given to Christopher (oncoming nurse) by Norberto Thompson (offgoing nurse). Report included the following information SBAR, Kardex, ED Summary, Intake/Output, MAR, Recent Results, Med Rec Status and Cardiac Rhythm nsr.

## 2020-08-05 NOTE — PROGRESS NOTES
0720 : Report received from [employer]. SBAR, Kardex, Intake/Output, MAR and Recent Results were discussed. Olga Alcaraz RN    4031 : Dr. Segundo Tariq rounding, will d/c insulin drip and place diet orders. 1055 : Patient requesting different medication for nausea. Spoke with Dr. Segundo Tariq, he will place orders. 5922 : Report given to David Diamond RN. SBAR, Kardex, Intake/Output, MAR or Recent Results were discussed. ASAD Doty assumed care of the pt.     Olga Alcaraz RN

## 2020-08-05 NOTE — DIABETES MGMT
EMA MILTON  CLINICAL NURSE SPECIALIST CONSULT  [facility]    INITIAL NOTE    Presentation   Sophy Owens is a 42 y.o. male admitted from the ER 8/4/20 with abdominal pain, nausea and vomiting. Afebrile. Hypertensive. NSR. Admission  with AG 24. AG closed 8 hours later. Current clinical course has been uncomplicated. DX: Hyperglycemia  HX: Bipolar disease. Type 1 diabetes  TX: Glucostabilizer    Diabetes: Patient has known Type 1 diabetes, X17 years treated with insulin PTA. Family history positive for diabetes in mother and niece. Consulted by Provider for advanced diabetes nursing assessment and care, specifically related to   [x] Transitioning off Cristian Schanz   [x] Inpatient management strategy  [x] Home management assessment    Diabetes-related medical history-deferred as patient is vomiting    Subjective   I've had diabetes for 17 years.     Patient reports the following home diabetes self-care practices:  Eating pattern  [x] Breakfast 7am Egg & cheese biscuit with water  [x] Lunch  130pm (2) sandwiches. 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 Actively vomiting. Vital Signs Afebrile. NSR. Hypertensive.    Visit Vitals  /90 (BP 1 Location: Left arm, BP Patient Position: At rest)   Pulse 89   Temp 97.8 °F (36.6 °C)   Resp 16   Ht 5' 9\" (1.753 m)   Wt 64 kg (141 lb)   SpO2 100%   BMI 20.82 kg/m²     Laboratory  Lab Results   Component Value Date/Time    Hemoglobin A1c 13.9 (H) 03/09/2020 05:24 PM     Lab Results   Component Value Date/Time    LDL, calculated Comment 03/16/2020 11:36 AM     Lab Results   Component Value Date/Time    Creatinine (POC) 1.1 01/02/2020 05:48 PM    Creatinine 1.24 08/05/2020 04:11 AM     Lab Results   Component Value Date/Time    Sodium 145 08/05/2020 04:11 AM    Potassium 3.3 (L) 08/05/2020 04:11 AM    Chloride 108 08/05/2020 04:11 AM    CO2 29 08/05/2020 04:11 AM    Anion gap 8 08/05/2020 04:11 AM    Glucose 92 08/05/2020 04:11 AM    BUN 31 (H) 08/05/2020 04:11 AM    Creatinine 1.24 08/05/2020 04:11 AM    BUN/Creatinine ratio 25 (H) 08/05/2020 04:11 AM    GFR est AA >60 08/05/2020 04:11 AM    GFR est non-AA >60 08/05/2020 04:11 AM    Calcium 8.2 (L) 08/05/2020 04:11 AM    Bilirubin, total 0.5 08/04/2020 07:33 AM    Alk. phosphatase 132 (H) 08/04/2020 07:33 AM    Protein, total 7.7 08/04/2020 07:33 AM    Albumin 3.4 (L) 08/04/2020 07:33 AM    Globulin 4.3 (H) 08/04/2020 07:33 AM    A-G Ratio 0.8 (L) 08/04/2020 07:33 AM    ALT (SGPT) 21 08/04/2020 07:33 AM     Lab Results   Component Value Date/Time    ALT (SGPT) 21 08/04/2020 07:33 AM       Factors affecting BG pattern  Factor Dose Comments   Nutrition:  Carb-controlled meals   60 grams/meal   Vomiting   Pain Nothing ordered Intensity 0-5   Infection  WBC 12     Blood glucose pattern        Assessment and Plan   Nursing Diagnosis Risk for unstable blood glucose pattern   Nursing Intervention Domain 5254 Decision-making Support   Nursing Interventions Examined current inpatient diabetes control   Explored factors facilitating and impeding inpatient management  Identified self-management practices impeding diabetes control     Evaluation   This gentleman, with Type 1 diabetes, has easily achieved inpatient blood glucose target of 100-180mg/dl on Glucostabilizer (GS). His AG has closed, and his BG is now 106. He required about 35 units via GS since admission. Patient has poor self-care practices related to diabetes. He wants a CGM to help manage his diabetes at home (as he admits to not checking his BGs except for the fasting).  Will pursue obtaining the device for this man in hopes that it can provide the data he needs to make better decisions at home. Must wait for him to stop vomiting. Recommendations   Recommend:    Basal insulin = Lantus insulin 20 units D  [x] 0.3 units/kg/D    Corrective insulin  [x] Normal sensitivity    Will schedule a teaching session to learn CGM (when I locate a device)    Referral   [x] Diabetes Self-Management Training through [facility] (Phone [phone] to schedule appointment) - Interpreting CGM data (if he can get it via insurance)    Billing Code(s)   I personally reviewed chart, notes, data and current medications in the medical record, and examined the patient at bedside before making care recommendations.      [x] [id number] IP subsequent hospital care - 30 minutes      ANEESH Guzman  [facility]  Access via [employer]

## 2020-08-06 VITALS
OXYGEN SATURATION: 100 % | BODY MASS INDEX: 20.88 KG/M2 | HEART RATE: 81 BPM | SYSTOLIC BLOOD PRESSURE: 154 MMHG | DIASTOLIC BLOOD PRESSURE: 90 MMHG | WEIGHT: 141 LBS | HEIGHT: 69 IN | RESPIRATION RATE: 20 BRPM | TEMPERATURE: 98.3 F

## 2020-08-06 LAB
ANION GAP SERPL CALC-SCNC: 4 MMOL/L (ref 5–15)
BASOPHILS # BLD: 0 K/UL (ref 0–0.1)
BASOPHILS NFR BLD: 0 % (ref 0–1)
BUN SERPL-MCNC: 17 MG/DL (ref 6–20)
BUN/CREAT SERPL: 20 (ref 12–20)
CALCIUM SERPL-MCNC: 7.7 MG/DL (ref 8.5–10.1)
CHLORIDE SERPL-SCNC: 103 MMOL/L (ref 97–108)
CO2 SERPL-SCNC: 30 MMOL/L (ref 21–32)
CREAT SERPL-MCNC: 0.87 MG/DL (ref 0.7–1.3)
DIFFERENTIAL METHOD BLD: ABNORMAL
EOSINOPHIL # BLD: 0.1 K/UL (ref 0–0.4)
EOSINOPHIL NFR BLD: 2 % (ref 0–7)
ERYTHROCYTE [DISTWIDTH] IN BLOOD BY AUTOMATED COUNT: 12.7 % (ref 11.5–14.5)
GLUCOSE BLD STRIP.AUTO-MCNC: 168 MG/DL (ref 65–100)
GLUCOSE BLD STRIP.AUTO-MCNC: 216 MG/DL (ref 65–100)
GLUCOSE SERPL-MCNC: 111 MG/DL (ref 65–100)
HCT VFR BLD AUTO: 27.6 % (ref 36.6–50.3)
HGB BLD-MCNC: 9.9 G/DL (ref 12.1–17)
IMM GRANULOCYTES # BLD AUTO: 0 K/UL (ref 0–0.04)
IMM GRANULOCYTES NFR BLD AUTO: 0 % (ref 0–0.5)
LYMPHOCYTES # BLD: 2.8 K/UL (ref 0.8–3.5)
LYMPHOCYTES NFR BLD: 33 % (ref 12–49)
MCH RBC QN AUTO: 31.9 PG (ref 26–34)
MCHC RBC AUTO-ENTMCNC: 35.9 G/DL (ref 30–36.5)
MCV RBC AUTO: 89 FL (ref 80–99)
MONOCYTES # BLD: 0.6 K/UL (ref 0–1)
MONOCYTES NFR BLD: 7 % (ref 5–13)
NEUTS SEG # BLD: 4.9 K/UL (ref 1.8–8)
NEUTS SEG NFR BLD: 58 % (ref 32–75)
NRBC # BLD: 0 K/UL (ref 0–0.01)
NRBC BLD-RTO: 0 PER 100 WBC
PLATELET # BLD AUTO: 311 K/UL (ref 150–400)
PMV BLD AUTO: 10.3 FL (ref 8.9–12.9)
POTASSIUM SERPL-SCNC: 3 MMOL/L (ref 3.5–5.1)
RBC # BLD AUTO: 3.1 M/UL (ref 4.1–5.7)
SERVICE CMNT-IMP: ABNORMAL
SERVICE CMNT-IMP: ABNORMAL
SODIUM SERPL-SCNC: 137 MMOL/L (ref 136–145)
WBC # BLD AUTO: 8.6 K/UL (ref 4.1–11.1)

## 2020-08-06 PROCEDURE — 85025 COMPLETE CBC W/AUTO DIFF WBC: CPT

## 2020-08-06 PROCEDURE — 74011000250 HC RX REV CODE- 250: Performed by: INTERNAL MEDICINE

## 2020-08-06 PROCEDURE — 36415 COLL VENOUS BLD VENIPUNCTURE: CPT

## 2020-08-06 PROCEDURE — C9113 INJ PANTOPRAZOLE SODIUM, VIA: HCPCS | Performed by: INTERNAL MEDICINE

## 2020-08-06 PROCEDURE — 80048 BASIC METABOLIC PNL TOTAL CA: CPT

## 2020-08-06 PROCEDURE — 74011250636 HC RX REV CODE- 250/636: Performed by: INTERNAL MEDICINE

## 2020-08-06 PROCEDURE — 74011250637 HC RX REV CODE- 250/637: Performed by: INTERNAL MEDICINE

## 2020-08-06 PROCEDURE — 74011636637 HC RX REV CODE- 636/637: Performed by: INTERNAL MEDICINE

## 2020-08-06 PROCEDURE — 82962 GLUCOSE BLOOD TEST: CPT

## 2020-08-06 RX ORDER — POTASSIUM CHLORIDE 750 MG/1
20 TABLET, FILM COATED, EXTENDED RELEASE ORAL
Status: COMPLETED | OUTPATIENT
Start: 2020-08-06 | End: 2020-08-06

## 2020-08-06 RX ORDER — FLASH GLUCOSE SENSOR
KIT MISCELLANEOUS
Qty: 6 KIT | Refills: 3 | Status: SHIPPED | OUTPATIENT
Start: 2020-08-06 | End: 2021-09-23

## 2020-08-06 RX ADMIN — INSULIN LISPRO 2 UNITS: 100 INJECTION, SOLUTION INTRAVENOUS; SUBCUTANEOUS at 08:46

## 2020-08-06 RX ADMIN — POTASSIUM CHLORIDE 20 MEQ: 750 TABLET, FILM COATED, EXTENDED RELEASE ORAL at 09:26

## 2020-08-06 RX ADMIN — INSULIN LISPRO 3 UNITS: 100 INJECTION, SOLUTION INTRAVENOUS; SUBCUTANEOUS at 11:43

## 2020-08-06 RX ADMIN — SODIUM CHLORIDE 40 MG: 9 INJECTION, SOLUTION INTRAMUSCULAR; INTRAVENOUS; SUBCUTANEOUS at 08:46

## 2020-08-06 RX ADMIN — GEMFIBROZIL 600 MG: 600 TABLET ORAL at 08:45

## 2020-08-06 RX ADMIN — POTASSIUM CHLORIDE 20 MEQ: 20 TABLET, EXTENDED RELEASE ORAL at 08:44

## 2020-08-06 RX ADMIN — METOPROLOL TARTRATE 25 MG: 25 TABLET, FILM COATED ORAL at 08:45

## 2020-08-06 RX ADMIN — TAMSULOSIN HYDROCHLORIDE 0.4 MG: 0.4 CAPSULE ORAL at 08:45

## 2020-08-06 RX ADMIN — ONDANSETRON 4 MG: 2 INJECTION INTRAMUSCULAR; INTRAVENOUS at 03:45

## 2020-08-06 NOTE — DISCHARGE INSTRUCTIONS
Patient Education        Diabetic Ketoacidosis (DKA): Care Instructions  Your Care Instructions  Diabetic ketoacidosis (DKA) happens when the body does not have enough insulin and can't get the sugar it needs for energy. When the body can't use sugar for energy, it starts to use fat for energy. This process makes fatty acids called ketones. The ketones build up in the blood and change the chemical balance in your body. This problem can be very dangerous and needs to be treated. Without treatment, it can lead to a coma or death. DKA occurs most often in people with type 1 diabetes. But people with type 2 diabetes also can get it. DKA can be caused by many things. It can happen if you don't take enough insulin. It can also happen if you have an infection or illness like the flu. Sometimes it happens if you are very dehydrated. DKA can only be treated with insulin and fluids. These are often given in a vein (IV). Follow-up care is a key part of your treatment and safety. Be sure to make and go to all appointments, and call your doctor if you are having problems. It's also a good idea to know your test results and keep a list of the medicines you take. How can you care for yourself at home? To reduce your chance of ketoacidosis:  · Take your insulin and other diabetes medicines on time and in the right dose. ? If an infection caused your DKA and your doctor prescribed antibiotics, take them as directed. Do not stop taking them just because you feel better. You need to take the full course of antibiotics. · Test your blood sugar before meals and at bedtime or as often as your doctor advises. This is the best way to know when your blood sugar is high so you can treat it early. Watching for symptoms is not as helpful. This is because you may not have symptoms until your blood sugar is very high. Or you may not notice them. · Teach others at work and at home how to check your blood sugar.  Make sure that someone else knows how do it in case you can't. · Wear or carry medical identification at all times. This is very important in case you are too sick or injured to speak for yourself. · Talk to your doctor about when you can start to exercise again. · Eat regular meals that spread your calories and carbohydrate throughout the day. This will help keep your blood sugar steady. · When you are sick:  ? Take your insulin and diabetes medicines. This is important even if you are vomiting and having trouble eating or drinking. Your blood sugar may go up because you are sick. If you are eating less than normal, you may need to change your dose of insulin. Talk with your doctor about a plan when you are well. Then you will know what to do when you are sick. ? Drink extra fluids to prevent dehydration. These include water, broth, and sugar-free drinks. If you don't drink enough, the insulin from your shot may not get into your blood. So your blood sugar may go up. ? Try to eat as you normally do, with a focus on healthy food choices. ? Check your blood sugar at least every 3 to 4 hours. Check it more often if it's rising fast. If your doctor has told you to take an extra insulin dose for high blood sugar levels (for example, above 240 mg/dL) be sure to take the right amount. If you're not sure how much to take, call your doctor. ? Check your temperature and pulse often. If your temperature goes up, call your doctor. You may be getting worse. ? If you take insulin, check your urine or blood for ketones, especially when you have high blood sugar (for example, above 240 mg/dL). Call your doctor if your ketone level is moderate or high. If you know your blood sugar is high, treat it before it gets worse. · If you missed your usual dose of insulin or other diabetes medicine, take the missed dose or take the amount your doctor told you to take if this happens.   · If you and your doctor decide on a dose of extra-fast-acting insulin, give yourself the right dose. If you take insulin and your doctor has not told you how much fast-acting insulin to take based on your blood sugar level, call your doctor. · Drink extra water or sugar-free drinks to prevent dehydration. · Wait 30 minutes after you take extra insulin or missed medicines. Then check your blood sugar again. · If symptoms of high blood sugar get worse or your blood sugar level keeps rising, call your doctor. If you start to feel sleepy or confused, [id number]. When should you call for help? [id number] anytime you think you may need emergency care. For example, call if:  · You passed out (lost consciousness). · You are confused or cannot think clearly. · Your blood sugar is very high or very low. Watch closely for changes in your health, and be sure to contact your doctor if:  · Your blood sugar stays outside the level your doctor set for you. · You have any problems. Where can you learn more? Go to http://dwaine-sanjeev.info/  Enter [id number] in the search box to learn more about \"Diabetic Ketoacidosis (DKA): Care Instructions. \"  Current as of: December 20, 2019               Content Version: 12.5  © 9877-5977 [employer]. Care instructions adapted under license by [employer] (which disclaims liability or warranty for this information). If you have questions about a medical condition or this instruction, always ask your healthcare professional. [place] [address] any warranty or liability for your use of this information.

## 2020-08-06 NOTE — PROGRESS NOTES
Hospital follow-up PCP transitional care appointment has been scheduled with Bobby Cochran for Weds, Aug 12 @ 11:15AM. Pending patient discharge.   Jack Jaimes, Care Management Specialist.

## 2020-08-06 NOTE — PROGRESS NOTES
Problem: Patient Education: Go to Patient Education Activity  Goal: Patient/Family Education  Outcome: Progressing Towards Goal     Problem: DKA: Day 1  Goal: Off Pathway (Use only if patient is Off Pathway)  Outcome: Progressing Towards Goal  Goal: Activity/Safety  Outcome: Progressing Towards Goal  Goal: Consults, if ordered  Outcome: Progressing Towards Goal  Goal: Diagnostic Tests/Procedures, if Ordered  Outcome: Progressing Towards Goal  Goal: Nutrition/Diet  Outcome: Progressing Towards Goal  Goal: Discharge Planning  Outcome: Progressing Towards Goal  Goal: Medications  Outcome: Progressing Towards Goal  Goal: Respiratory  Outcome: Progressing Towards Goal  Goal: Treatments/Interventions/Procedures  Outcome: Progressing Towards Goal  Goal: Psychosocial  Outcome: Progressing Towards Goal  Goal: *Hemodynamically stable  Outcome: Progressing Towards Goal  Goal: *Blood glucose falling 50 to 100 mg/dl/hr  Outcome: Progressing Towards Goal  Goal: *Potassium normalizing  Outcome: Progressing Towards Goal     Problem: DKA: Day 2  Goal: Off Pathway (Use only if patient is Off Pathway)  Outcome: Progressing Towards Goal  Goal: Activity/Safety  Outcome: Progressing Towards Goal  Goal: Consults, if ordered  Outcome: Progressing Towards Goal  Goal: Diagnostic Test/Procedures  Outcome: Progressing Towards Goal  Goal: Nutrition/Diet  Outcome: Progressing Towards Goal  Goal: Discharge Planning  Outcome: Progressing Towards Goal  Goal: Medications  Outcome: Progressing Towards Goal  Goal: Respiratory  Outcome: Progressing Towards Goal  Goal: Treatments/Interventions/Procedures  Outcome: Progressing Towards Goal  Goal: Psychosocial  Outcome: Progressing Towards Goal  Goal: *Acidosis resolved  Outcome: Progressing Towards Goal  Goal: *Tolerating diet  Outcome: Progressing Towards Goal  Goal: *Demonstrates progressive activity  Outcome: Progressing Towards Goal  Goal: *Blood glucose 80 to 180 mg/dl  Outcome: Progressing Towards Goal     Problem: DKA: Day 3  Goal: Off Pathway (Use only if patient is Off Pathway)  Outcome: Progressing Towards Goal  Goal: Activity/Safety  Outcome: Progressing Towards Goal  Goal: Diagnostic Test/Procedures  Outcome: Progressing Towards Goal  Goal: Nutrition/Diet  Outcome: Progressing Towards Goal  Goal: Discharge Planning  Outcome: Progressing Towards Goal  Goal: Medications  Outcome: Progressing Towards Goal  Goal: Treatments/Interventions/Procedures  Outcome: Progressing Towards Goal  Goal: Psychosocial  Outcome: Progressing Towards Goal     Problem: DKA: Discharge Outcomes  Goal: *Ambulates and performs ADL's  Outcome: Progressing Towards Goal  Goal: *Describes follow-up/return visits to physicians, diabetes treatment coordinator and other resources  Outcome: Progressing Towards Goal  Goal: *Blood glucose at patient's target range  Outcome: Progressing Towards Goal  Goal: *Acidosis resolved  Outcome: Progressing Towards Goal  Goal: *Tolerating diet  Outcome: Progressing Towards Goal  Goal: *Verbalizes understanding and describes prescribed diet  Outcome: Progressing Towards Goal  Goal: *Describes blood glucose goals, monitoring, sick day rules, hypo/hyperglycemia  Outcome: Progressing Towards Goal  Goal: *Describes available resources and support systems  Outcome: Progressing Towards Goal  Goal: *Verbalizes name, dosage, time, side effects, and number of days to continue medications  Outcome: Progressing Towards Goal  Goal: *Demonstrates ability to self-administer insulin  Outcome: Progressing Towards Goal     Problem: Falls - Risk of  Goal: *Absence of Falls  Description: Document Shannon Fall Risk and appropriate interventions in the flowsheet.   Outcome: Progressing Towards Goal  Note: Fall Risk Interventions:            Medication Interventions: Patient to call before getting OOB                   Problem: Patient Education: Go to Patient Education Activity  Goal: Patient/Family Education  Outcome: Progressing Towards Goal     Problem: Diabetes Self-Management  Goal: *Disease process and treatment process  Description: Define diabetes and identify own type of diabetes; list 3 options for treating diabetes. Outcome: Progressing Towards Goal  Goal: *Incorporating nutritional management into lifestyle  Description: Describe effect of type, amount and timing of food on blood glucose; list 3 methods for planning meals. Outcome: Progressing Towards Goal  Goal: *Incorporating physical activity into lifestyle  Description: State effect of exercise on blood glucose levels. Outcome: Progressing Towards Goal  Goal: *Developing strategies to promote health/change behavior  Description: Define the ABC's of diabetes; identify appropriate screenings, schedule and personal plan for screenings. Outcome: Progressing Towards Goal  Goal: *Using medications safely  Description: State effect of diabetes medications on diabetes; name diabetes medication taking, action and side effects. Outcome: Progressing Towards Goal  Goal: *Monitoring blood glucose, interpreting and using results  Description: Identify recommended blood glucose targets  and personal targets. Outcome: Progressing Towards Goal  Goal: *Prevention, detection, treatment of acute complications  Description: List symptoms of hyper- and hypoglycemia; describe how to treat low blood sugar and actions for lowering  high blood glucose level. Outcome: Progressing Towards Goal  Goal: *Prevention, detection and treatment of chronic complications  Description: Define the natural course of diabetes and describe the relationship of blood glucose levels to long term complications of diabetes.   Outcome: Progressing Towards Goal  Goal: *Developing strategies to address psychosocial issues  Description: Describe feelings about living with diabetes; identify support needed and support network  Outcome: Progressing Towards Goal  Goal: *Insulin pump training  Outcome: Progressing Towards Goal  Goal: *Sick day guidelines  Outcome: Progressing Towards Goal  Goal: *Patient Specific Goal (EDIT GOAL, INSERT TEXT)  Outcome: Progressing Towards Goal     Problem: Patient Education: Go to Patient Education Activity  Goal: Patient/Family Education  Outcome: Progressing Towards Goal

## 2020-08-06 NOTE — DISCHARGE SUMMARY
Hospitalist Discharge Summary     Patient ID:  Karla Henry  [id number]  32 y.o.  1980 8/4/2020    PCP on record: Frantz Mehta MD    Admit date: 8/4/2020  Discharge date and time: 8/6/2020    DISCHARGE DIAGNOSIS:    DKA    CONSULTATIONS:  None    Excerpted HPI from H&P of Basilia Jones MD:    Ramakrishna Pappas is a 42 y.o.   male who presents with diabetes mellitus, hypertension is coming the hospital chief complaints of, nausea vomiting and also very high blood sugars. Patient reports that he was in his usual state of health until about 2 days ago when he started having nausea, vomiting along with abdominal pain. He reports pain is mostly generalized, mild, 2 x 10, nonradiating, without any aggravating or relieving factors. He reports nausea along with 2-3 episodes of vomitings per day which are dark, liquidy, non-foul-smelling and with no foot. He does not report any diarrhea or constipation. Denies chest pain or shortness of breath. Denies fever, chills, cough or phlegm. Denies focal weakness, tingling or numbness.     On arrival to the hospital, his vital signs were within normal limits. On labs he was noted to have a platelet count of [phone]. BMP showed a creatinine of 1.62. LFTs were within normal limits. He was given IV insulin drip. He is also given normal saline bolus in the ED.  ______________________________________________________________________  DISCHARGE SUMMARY/HOSPITAL COURSE:  for full details see H&P, daily progress notes, labs, consult notes.        Diabetic ketoacidosis unclear precipitant  -resolved   -still complaining from nausea   -d/c insulin IV   -start on sliding scale   -start on lantus   -- d/c iv fluid      Nausea vomiting and abdominal pain likely in the setting of DKA     -Check FOBT   -Start Protonix 40 mg IV twice daily  -we ll Consider further work-up if symptoms are persistent     Acute kidney injury likely prerenal resolved   -D/C  IV fluids. [place] is normal  -Hold all nephrotoxic medications including Lasix, lisinopril        Hypertension  -Continue metoprolol.  Hold lisinopril     Dyslipidemia  History of GERD  BPH ?  -Continue gemfibrozil  -Continue Protonix but changed to IV  -Continue home Flomax     Body mass index is 20.82 kg/m².     Code status: Full  Prophylaxis: SCD's  Recommended Disposition: Home w/Family      _______________________________________________________________________  Patient seen and examined by me on discharge day. Pertinent Findings:  Gen:    Not in distress  Chest: Clear lungs  CVS:   Regular rhythm. No edema  Abd:  Soft, not distended, not tender  Neuro:  Alert, oriented X3  _______________________________________________________________________  DISCHARGE MEDICATIONS:   Current Discharge Medication List      CONTINUE these medications which have NOT CHANGED    Details   cholecalciferol (VITAMIN D3) (5000 Units/125 mcg) tab tablet Take 5,000 Units by mouth daily. lisinopriL (PRINIVIL, ZESTRIL) 5 mg tablet Take 1 tablet by mouth once daily  Qty: 30 Tab, Refills: 0    Associated Diagnoses: DM type 2, goal HbA1c < 7% ([facility])      metoprolol tartrate (LOPRESSOR) 25 mg tablet TAKE 1 TABLET BY MOUTH EVERY 12 HOURS  Qty: 60 Tab, Refills: 0    Associated Diagnoses: Essential hypertension      furosemide (Lasix) 40 mg tablet Take 1 Tab by mouth daily. Qty: 30 Tab, Refills: 1      potassium chloride (K-DUR, KLOR-CON) 20 mEq tablet Take 1 Tab by mouth daily.   Qty: 30 Tab, Refills: 1      insulin glargine (Lantus Solostar U-100 Insulin) 100 unit/mL (3 mL) inpn adminster 30 units subcut daily  Qty: 10 Adjustable Dose Pre-filled Pen Syringe, Refills: 3    Associated Diagnoses: DM type 2, goal HbA1c < 7% ([facility])      insulin aspart U-100 (NovoLOG Flexpen U-100 Insulin) 100 unit/mL (3 mL) inpn (Novolog or Humalog, whichever more preferred: Inject 5 units with each meal + correction insulin, up to 30 units per day  Qty: 10 Adjustable Dose Pre-filled Pen Syringe, Refills: 3      ergocalciferol (ERGOCALCIFEROL) 1,250 mcg (50,000 unit) capsule Take 1 Cap by mouth every seven (7) days. Qty: 4 Cap, Refills: 3    Associated Diagnoses: Vitamin D deficiency      gemfibroziL (LOPID) 600 mg tablet Take 1 Tab by mouth two (2) times a day. Qty: 60 Tab, Refills: 3    Associated Diagnoses: Hyperlipidemia, unspecified hyperlipidemia type      pantoprazole (PROTONIX) 40 mg tablet Take 1 Tab by mouth two (2) times a day. Qty: 60 Tab, Refills: 0      tamsulosin (FLOMAX) 0.4 mg capsule Take 1 Cap by mouth daily. Qty: 30 Cap, Refills: 1               Patient Follow Up Instructions: Activity: Activity as tolerated  Diet: Diabetic Diet  Wound Care: None needed    Follow-up with PCP in 3 days. Follow-up tests/labs   Follow-up Information     Follow up With Specialties Details Why Yoselin Stanton MD Internal Medicine In 3 days [facility] follow up appointment has been scheduled for Weds, August 12 at 11:15AM. [address]  [phone]          ________________________________________________________________    Risk of deterioration: Low    Condition at Discharge:  Stable  __________________________________________________________________    Disposition  Home with family, no needs    ____________________________________________________________________    Code Status: Full Code  ___________________________________________________________________      Total time in minutes spent coordinating this discharge (includes going over instructions, follow-up, prescriptions, and preparing report for sign off to her PCP) :  60 minutes    Signed:   Marii Hickey MD

## 2020-08-06 NOTE — DIABETES MGMT
EMA MILTON  CLINICAL NURSE SPECIALIST CONSULT  [facility]    Followup NOTE    Presentation   Geoffrey Wilson is a 42 y.o. male admitted from the ER 8/4/20 with abdominal pain, nausea and vomiting. Afebrile. Hypertensive. NSR. Admission  with AG 24. AG closed 8 hours later. Current clinical course has been uncomplicated. DX: Hyperglycemia  HX: Bipolar disease. Type 1 diabetes  TX: Corrective insulin    Clinical course has been uncomplicated. Off Glucostabilizer since 8am yesterday. BG down to 168 this AM. AG has closed. Diabetes: Patient has known Type 1 diabetes, X17 years treated with insulin PTA. Family history positive for diabetes in mother and niece. Subjective   I didn't eat the spaghetti last evening because I didn't want to put that sauce on my stomach, but I ate breakfast. No nausea. \"     Objective   Physical exam  General Alert, oriented and in no acute distress. Vital Signs Afebrile. NSR. Hypertensive.    Visit Vitals  /89 (BP 1 Location: Left arm, BP Patient Position: At rest)   Pulse 89   Temp 97.9 °F (36.6 °C)   Resp 18   Ht 5' 9\" (1.753 m)   Wt 64 kg (141 lb)   SpO2 98%   BMI 20.82 kg/m²     Laboratory  Lab Results   Component Value Date/Time    Hemoglobin A1c >14.0 (H) 08/05/2020 02:56 AM     Lab Results   Component Value Date/Time    LDL, calculated Comment 03/16/2020 11:36 AM     Lab Results   Component Value Date/Time    Creatinine (POC) 1.1 01/02/2020 05:48 PM    Creatinine 0.87 08/06/2020 03:44 AM     Lab Results   Component Value Date/Time    Sodium 137 08/06/2020 03:44 AM    Potassium 3.0 (L) 08/06/2020 03:44 AM    Chloride 103 08/06/2020 03:44 AM    CO2 30 08/06/2020 03:44 AM    Anion gap 4 (L) 08/06/2020 03:44 AM    Glucose 111 (H) 08/06/2020 03:44 AM    BUN 17 08/06/2020 03:44 AM    Creatinine 0.87 08/06/2020 03:44 AM    BUN/Creatinine ratio 20 08/06/2020 03:44 AM    GFR est AA >60 08/06/2020 03:44 AM    GFR est non-AA >60 08/06/2020 03:44 AM Calcium 7.7 (L) 08/06/2020 03:44 AM    Bilirubin, total 0.5 08/04/2020 07:33 AM    Alk. phosphatase 132 (H) 08/04/2020 07:33 AM    Protein, total 7.7 08/04/2020 07:33 AM    Albumin 3.4 (L) 08/04/2020 07:33 AM    Globulin 4.3 (H) 08/04/2020 07:33 AM    A-G Ratio 0.8 (L) 08/04/2020 07:33 AM    ALT (SGPT) 21 08/04/2020 07:33 AM     Lab Results   Component Value Date/Time    ALT (SGPT) 21 08/04/2020 07:33 AM       Factors affecting BG pattern  Factor Dose Comments   Nutrition:  Carb-controlled meals   60 grams/meal   Patient reports good appetite this AM. No nausea or vomiting. Infection  WBC 12 => 8.6     Blood glucose pattern        Assessment and Plan   Nursing Diagnosis Risk for unstable blood glucose pattern   Nursing Intervention Domain 525 Decision-making Support   Nursing Interventions Examined current inpatient diabetes control   Explored factors facilitating and impeding inpatient management  Discussed expectations related to the use of the [employer] Connor CGM system in managing BG- using trend data to optimize BG self-management  Advised against reactionary behaviors to hyperglycemia that result in correcting multiple times as it can predispose to hypoglycemia  Demonstrated procedure for insertion of sensor  Set up reader and instructed in use of renetta if desired  Demonstrated data capture as warming period completed     Evaluation   This gentleman, with Type 1 diabetes, has achieved inpatient blood glucose target of 100-180mg/dl after DKA resolution. Will need to begin basal insulin today as he has Type 1 diabetes, and scheduled mealtime insulin. Dr. Qasim Sosa agreed to use of CGM to augment self-management. Gave patient device and sensor. Patient inserted CGM sensor with guidance. Ready to use device via reader and/or phone renetta at home.     Recommendations   Recommend:    CGM for home use    Basal insulin = Lantus insulin 20 units D  [x] 0.3 units/kg/D    Mealtime insulin = Humalog insulin 4 units with consumed meals    Continue corrective insulin    Referral   [x] Diabetes Self-Management Training through [facility] (Phone [phone] to schedule appointment) - Interpreting CGM data (if he can get it via insurance)    Billing Code(s)   I personally reviewed chart, notes, data and current medications in the medical record, and examined the patient at bedside before making care recommendations.      [x] [id number] IP subsequent hospital care - 45 minutes      ANEESH Dow  [facility]  Access via [employer]

## 2020-08-06 NOTE — PROGRESS NOTES
MELL:  1) Home with FU appts  2) Girlfriend to transport at d/c    10:53 AM: Pt will discharge today per attending. Follow up appt made with PCP for 8/12. AVS updated. No further needs identified and pt is clear to discharge from a care management standpoint. RN notified. Care Management Interventions  PCP Verified by CM: Yes  Mode of Transport at Discharge: Other (see comment)(girlfriend)  Transition of Care Consult (CM Consult):  Other(Home with OP FU appts)  Discharge Durable Medical Equipment: No  Health Maintenance Reviewed: Yes  Physical Therapy Consult: No  Occupational Therapy Consult: No  Speech Therapy Consult: No  Current Support Network: Relative's Home  Confirm Follow Up Transport: Self  The Patient and/or Patient Representative was Provided with a Choice of Provider and Agrees with the Discharge Plan?: No  Freedom of Choice List was Provided with Basic Dialogue that Supports the Patient's Individualized Plan of Care/Goals, Treatment Preferences and Shares the Quality Data Associated with the Providers?: No  Discharge Location  Discharge Placement: Home with outpatient services    Darrell Chapin RN ,[facility] [address] [facility],[address]   [phone]

## 2020-08-06 NOTE — PROGRESS NOTES
Problem: Falls - Risk of  Goal: *Absence of Falls  Description: Document Cleve Lowry Fall Risk and appropriate interventions in the flowsheet.   Outcome: Progressing Towards Goal  Note: Fall Risk Interventions:            Medication Interventions: Patient to call before getting OOB

## 2020-08-06 NOTE — PROGRESS NOTES
0700:  Bedside shift change report given to Mary Ortiz RN (oncoming nurse) by Kamilla Tavares RN (offgoing nurse). Report included the following information SBAR, Kardex, Intake/Output, MAR, Recent Results and Cardiac Rhythm NSR.       0845:  DM Educators at bedside. Pt denies pain, nausea. 0930:  KCl given. Pt still denies pain, nausea. Pt stated anxious to go home. 1130:  RN perfect served MD Dimple Barrera re: discharge. RN awaiting further orders. 1215:  Received discharge orders from MD.  Informed pt to contact ride while RN gets discharge instructions ready. 1240: Pt discharged with instructions explained, all personal belongings via wheelchair to [employer] entrance. 1250:  MD paged for pt request to get a doctor's note for work. MD to contact pt at home.

## 2020-08-07 ENCOUNTER — PATIENT OUTREACH (OUTPATIENT)
Dept: CASE MANAGEMENT | Age: 40
End: 2020-08-07

## 2020-08-08 DIAGNOSIS — E11.9 DM TYPE 2, GOAL HBA1C < 7% (HCC): ICD-10-CM

## 2020-08-08 DIAGNOSIS — I10 ESSENTIAL HYPERTENSION: ICD-10-CM

## 2020-08-09 RX ORDER — METOPROLOL TARTRATE 25 MG/1
TABLET, FILM COATED ORAL
Qty: 60 TAB | Refills: 0 | Status: SHIPPED | OUTPATIENT
Start: 2020-08-09 | End: 2020-09-13

## 2020-08-09 RX ORDER — POTASSIUM CHLORIDE 20 MEQ/1
TABLET, EXTENDED RELEASE ORAL
Qty: 30 TAB | Refills: 0 | Status: SHIPPED | OUTPATIENT
Start: 2020-08-09 | End: 2020-09-13

## 2020-08-09 RX ORDER — LISINOPRIL 5 MG/1
TABLET ORAL
Qty: 30 TAB | Refills: 0 | Status: SHIPPED | OUTPATIENT
Start: 2020-08-09 | End: 2020-09-13

## 2020-08-10 ENCOUNTER — TELEPHONE (OUTPATIENT)
Dept: PRIMARY CARE CLINIC | Age: 40
End: 2020-08-10

## 2020-08-10 NOTE — TELEPHONE ENCOUNTER
PA has been approved with CoverMyMeds.      [facility] has been notified of approval for 14 day sensor     Patient phar rakan to notify patient when rx is ready for pickup

## 2020-08-10 NOTE — TELEPHONE ENCOUNTER
Life style [facility] was prescribed by the ER. He was there last Thursday, one of the sensor got damaged and he went for refill and the pharmacy said that he will need a prior auth from the Doctor. Pt requested a call back from the nurse after the prior auth is done.

## 2020-09-13 DIAGNOSIS — E11.9 DM TYPE 2, GOAL HBA1C < 7% (HCC): ICD-10-CM

## 2020-09-13 DIAGNOSIS — I10 ESSENTIAL HYPERTENSION: ICD-10-CM

## 2020-09-13 RX ORDER — METOPROLOL TARTRATE 25 MG/1
TABLET, FILM COATED ORAL
Qty: 60 TAB | Refills: 0 | Status: SHIPPED | OUTPATIENT
Start: 2020-09-13 | End: 2020-10-20

## 2020-09-13 RX ORDER — POTASSIUM CHLORIDE 20 MEQ/1
TABLET, EXTENDED RELEASE ORAL
Qty: 30 TAB | Refills: 0 | Status: SHIPPED | OUTPATIENT
Start: 2020-09-13 | End: 2020-10-20

## 2020-09-13 RX ORDER — LISINOPRIL 5 MG/1
TABLET ORAL
Qty: 30 TAB | Refills: 0 | Status: SHIPPED | OUTPATIENT
Start: 2020-09-13 | End: 2020-10-20

## 2020-09-22 ENCOUNTER — TELEPHONE (OUTPATIENT)
Dept: PRIMARY CARE CLINIC | Age: 40
End: 2020-09-22

## 2020-09-25 ENCOUNTER — OFFICE VISIT (OUTPATIENT)
Dept: PRIMARY CARE CLINIC | Age: 40
End: 2020-09-25
Payer: COMMERCIAL

## 2020-09-25 VITALS
WEIGHT: 146.4 LBS | TEMPERATURE: 98.1 F | HEIGHT: 69 IN | OXYGEN SATURATION: 99 % | BODY MASS INDEX: 21.68 KG/M2 | SYSTOLIC BLOOD PRESSURE: 110 MMHG | RESPIRATION RATE: 18 BRPM | HEART RATE: 81 BPM | DIASTOLIC BLOOD PRESSURE: 74 MMHG

## 2020-09-25 DIAGNOSIS — E11.9 CONTROLLED TYPE 2 DIABETES MELLITUS WITHOUT COMPLICATION, WITHOUT LONG-TERM CURRENT USE OF INSULIN (HCC): ICD-10-CM

## 2020-09-25 DIAGNOSIS — N52.9 ERECTILE DYSFUNCTION, UNSPECIFIED ERECTILE DYSFUNCTION TYPE: ICD-10-CM

## 2020-09-25 DIAGNOSIS — E11.9 CONTROLLED TYPE 2 DIABETES MELLITUS WITHOUT COMPLICATION, WITHOUT LONG-TERM CURRENT USE OF INSULIN (HCC): Primary | ICD-10-CM

## 2020-09-25 DIAGNOSIS — I10 ESSENTIAL HYPERTENSION: ICD-10-CM

## 2020-09-25 DIAGNOSIS — N40.1 BENIGN PROSTATIC HYPERPLASIA WITH LOWER URINARY TRACT SYMPTOMS, SYMPTOM DETAILS UNSPECIFIED: ICD-10-CM

## 2020-09-25 DIAGNOSIS — E55.9 VITAMIN D DEFICIENCY: ICD-10-CM

## 2020-09-25 LAB
25(OH)D3 SERPL-MCNC: 33.4 NG/ML (ref 30–100)
ALBUMIN SERPL-MCNC: 2.7 G/DL (ref 3.5–5)
ALBUMIN/GLOB SERPL: 0.9 {RATIO} (ref 1.1–2.2)
ALP SERPL-CCNC: 101 U/L (ref 45–117)
ALT SERPL-CCNC: 20 U/L (ref 12–78)
ANION GAP SERPL CALC-SCNC: 4 MMOL/L (ref 5–15)
AST SERPL-CCNC: 11 U/L (ref 15–37)
BILIRUB SERPL-MCNC: 0.2 MG/DL (ref 0.2–1)
BUN SERPL-MCNC: 24 MG/DL (ref 6–20)
BUN/CREAT SERPL: 21 (ref 12–20)
CALCIUM SERPL-MCNC: 9.2 MG/DL (ref 8.5–10.1)
CHLORIDE SERPL-SCNC: 99 MMOL/L (ref 97–108)
CHOLEST SERPL-MCNC: 298 MG/DL
CO2 SERPL-SCNC: 29 MMOL/L (ref 21–32)
CREAT SERPL-MCNC: 1.14 MG/DL (ref 0.7–1.3)
GLOBULIN SER CALC-MCNC: 3 G/DL (ref 2–4)
GLUCOSE SERPL-MCNC: 368 MG/DL (ref 65–100)
HBA1C MFR BLD HPLC: 13.7 %
HDLC SERPL-MCNC: 56 MG/DL
HDLC SERPL: 5.3 {RATIO} (ref 0–5)
LDLC SERPL CALC-MCNC: 178.8 MG/DL (ref 0–100)
LIPID PROFILE,FLP: ABNORMAL
POTASSIUM SERPL-SCNC: 5.6 MMOL/L (ref 3.5–5.1)
PROT SERPL-MCNC: 5.7 G/DL (ref 6.4–8.2)
SODIUM SERPL-SCNC: 132 MMOL/L (ref 136–145)
TRIGL SERPL-MCNC: 316 MG/DL (ref ?–150)
VLDLC SERPL CALC-MCNC: 63.2 MG/DL

## 2020-09-25 PROCEDURE — 99213 OFFICE O/P EST LOW 20 MIN: CPT | Performed by: INTERNAL MEDICINE

## 2020-09-25 PROCEDURE — 83036 HEMOGLOBIN GLYCOSYLATED A1C: CPT | Performed by: INTERNAL MEDICINE

## 2020-09-25 RX ORDER — SILDENAFIL 50 MG/1
50 TABLET, FILM COATED ORAL AS NEEDED
Qty: 10 TAB | Refills: 2 | Status: SHIPPED | OUTPATIENT
Start: 2020-09-25 | End: 2021-01-11

## 2020-09-25 NOTE — PROGRESS NOTES
Chief Complaint   Patient presents with   [facility]      lab.  declined influenza vaccine    Priapism     issues

## 2020-09-28 DIAGNOSIS — E87.5 SERUM POTASSIUM ELEVATED: Primary | ICD-10-CM

## 2020-09-28 NOTE — PROGRESS NOTES
Karina Castaneda is a 42 y.o.  male and presents with     Chief Complaint   Patient presents with   [address] [place] [address] Diabetes Care Management      lab. declined influenza vaccine    Erectile Dysfunction     issues     Patient is here for a follow-up. He was admitted to the hospital in the month of August for DKA. Patient has been monitoring his sugars. Blood sugars are low in the morning and high in the evening. Patient does admit that he takes his insulin as directed. He is supposed to see an endocrinologist.  He does have type 1 diabetes but currently not on a pump. It is unclear to me if her pump was ever prescribed to the patient by the endocrinologist.  Based on the blood sugar readings it seems like the blood sugar sugars fluctuate a lot. He also complains of erectile dysfunction. Past Medical History:   Diagnosis Date    Bipolar 1 disorder, depressed ([address]     Bipolar disorder ([address]     Depression     Diabetes ([address]     DKA, type 1 ([address] 1/27/2013    diagnosed age 23    H/O noncompliance with medical treatment, presenting hazards to health     MRSA (methicillin resistant staph aureus) culture positive     MRSA (methicillin resistant Staphylococcus aureus)     Face    Noncompliance with medication regimen     Second hand smoke exposure     Seizure ([address]     Seizures ([address] 2006 or 2007    one episode during prison     Past Surgical History:   Procedure Laterality Date    HX HEENT      top left wisdom tooth    HX ORTHOPAEDIC Left     wrist; MCV    UPPER GI ENDOSCOPY,BIOPSY  11/20/2018          Current Outpatient Medications   Medication Sig    sildenafil citrate (VIAGRA) 50 mg tablet Take 1 Tab by mouth as needed (prn).     potassium chloride (K-DUR, KLOR-CON) 20 mEq tablet TAKE 1  BY MOUTH ONCE DAILY    metoprolol tartrate (LOPRESSOR) 25 mg tablet TAKE 1 TABLET BY MOUTH EVERY 12 HOURS    lisinopriL (PRINIVIL, ZESTRIL) 5 mg tablet Take 1 tablet by mouth once daily    flash glucose sensor (FreeStyle Connor 14 Day Sensor) kit 6 (14 day) sensors for use with Freestyle Scanner    ergocalciferol (ERGOCALCIFEROL) 1,250 mcg (50,000 unit) capsule Take 1 Cap by mouth every seven (7) days.  furosemide (Lasix) 40 mg tablet Take 1 Tab by mouth daily.  insulin glargine (Lantus Solostar U-100 Insulin) 100 unit/mL (3 mL) inpn adminster 30 units subcut daily    insulin aspart U-100 (NovoLOG Flexpen U-100 Insulin) 100 unit/mL (3 mL) inpn (Novolog or Humalog, whichever more preferred: Inject 5 units with each meal + correction insulin, up to 30 units per day    gemfibroziL (LOPID) 600 mg tablet Take 1 Tab by mouth two (2) times a day.  pantoprazole (PROTONIX) 40 mg tablet Take 1 Tab by mouth two (2) times a day.  tamsulosin (FLOMAX) 0.4 mg capsule Take 1 Cap by mouth daily. No current facility-administered medications for this visit. Health Maintenance   Topic Date Due    Eye Exam Retinal or Dilated  11/22/1992    Pneumococcal 0-64 years (1 of 1 - PPSV23) 10/13/2011    Flu Vaccine (1) 09/01/2020    A1C test (Diabetic or Prediabetic)  11/05/2020    Foot Exam Q1  03/07/2021    MICROALBUMIN Q1  03/16/2021    Lipid Screen  03/16/2021    DTaP/Tdap/Td series (2 - Td) 03/09/2028     Immunization History   Administered Date(s) Administered    (RETIRED) Pneumococcal Vaccine (Unspecified Type) 08/18/2011    TD Vaccine 03/08/2011    Tdap 03/09/2018     No LMP for male patient. Allergies and Intolerances:   No Known Allergies    Family History:   Family History   Problem Relation Age of Onset    Diabetes Mother     Diabetes Other         neice, type 1        Social History:   He  reports that he quit smoking about 7 months ago. His smoking use included cigarettes. He has a 1.60 pack-year smoking history. He has never used smokeless tobacco.  He  reports no history of alcohol use.             Review of Systems:   General: negative for - chills, fatigue, fever, weight change  Psych: negative for - anxiety, depression, irritability or mood swings  ENT: negative for - headaches, hearing change, nasal congestion, oral lesions, sneezing or sore throat  Heme/ Lymph: negative for - bleeding problems, bruising, pallor or swollen lymph nodes  Endo: negative for - hot flashes, polydipsia/polyuria or temperature intolerance  Resp: negative for - cough, shortness of breath or wheezing  CV: negative for - chest pain, edema or palpitations  GI: negative for - abdominal pain, change in bowel habits, constipation, diarrhea or nausea/vomiting  : negative for - dysuria, hematuria, incontinence, pelvic pain or vulvar/vaginal symptoms  MSK: negative for - joint pain, joint swelling or muscle pain  Neuro: negative for - confusion, headaches, seizures or weakness  Derm: negative for - dry skin, hair changes, rash or skin lesion changes          Physical:   Vitals:   Vitals:    09/25/20 0811   BP: 110/74   Pulse: 81   Resp: 18   Temp: 98.1 °F (36.7 °C)   TempSrc: Oral   SpO2: 99%   Weight: 146 lb 6.4 oz (66.4 kg)   Height: 5' 9\" (1.753 m)           Exam:   HEENT- atraumatic,normocephalic, awake, oriented, well nourished  Neck - supple,no enlarged lymph nodes, no JVD, no thyromegaly  Chest- CTA, no rhonchi, no crackles  Heart- rrr, no murmurs / gallop/rub  Abdomen- soft,BS+,NT, no hepatosplenomegaly  Ext - no c/c/edema   Neuro- no focal deficits. Power 5/5 all extremities  Skin - warm,dry, no obvious rashes.           Review of Data:   LABS:   Lab Results   Component Value Date/Time    WBC 8.6 08/06/2020 03:44 AM    HGB 9.9 (L) 08/06/2020 03:44 AM    HCT 27.6 (L) 08/06/2020 03:44 AM    PLATELET 414 34/85/9792 03:44 AM     Lab Results   Component Value Date/Time    Sodium 132 (L) 09/25/2020 09:11 AM    Potassium 5.6 (H) 09/25/2020 09:11 AM    Chloride 99 09/25/2020 09:11 AM    CO2 29 09/25/2020 09:11 AM    Glucose 368 (H) 09/25/2020 09:11 AM    BUN 24 (H) 09/25/2020 09:11 AM    Creatinine 1.14 09/25/2020 09:11 AM Lab Results   Component Value Date/Time    Cholesterol, total 298 (H) 09/25/2020 09:11 AM    HDL Cholesterol 56 09/25/2020 09:11 AM    LDL, calculated 178.8 (H) 09/25/2020 09:11 AM    Triglyceride 316 (H) 09/25/2020 09:11 AM     No components found for: GPT        Impression / Plan:        ICD-10-CM ICD-9-CM    1. Controlled type 2 diabetes mellitus without complication, without long-term current use of insulin (HCC)  E11.9 250.00 AMB POC HEMOGLOBIN N5O      METABOLIC PANEL, COMPREHENSIVE      LIPID PANEL      MICROALBUMIN, UR, 24 HR   2. Essential hypertension  I10 401.9    3. Vitamin D deficiency  E55.9 268.9 VITAMIN D, 25 HYDROXY   4. Benign prostatic hyperplasia with lower urinary tract symptoms, symptom details unspecified  N40.1 600.01    5. Erectile dysfunction, unspecified erectile dysfunction type  N52.9 607.84 sildenafil citrate (VIAGRA) 50 mg tablet     Asked patient to make an appointment with endocrinology, he may need an insulin pump for better control of his sugars. They seem to be fluctuating a lot. Explained to patient risk benefits of the medications. Advised patient to stop meds if having any side effects. Pt verbalized understanding of the instructions. I have discussed the diagnosis with the patient and the intended plan as seen in the above orders. The patient has received an after-visit summary and questions were answered concerning future plans. I have discussed medication side effects and warnings with the patient as well. I have reviewed the plan of care with the patient, accepted their input and they are in agreement with the treatment goals. Reviewed plan of care. Patient has provided input and agrees with goals. Follow-up and Dispositions    · Return in about 3 weeks (around 10/16/2020).          Burton Grace MD

## 2020-09-28 NOTE — PROGRESS NOTES
K is slightly elevated. Pt should stop taking Kdur. I have ordered repeat K levels. Pt should get it done in 3- days. Also chol is elevated. Is pt taking chol medicine. If not I can semd him medicine for high chol.

## 2020-10-20 DIAGNOSIS — E55.9 VITAMIN D DEFICIENCY: ICD-10-CM

## 2020-10-20 DIAGNOSIS — I10 ESSENTIAL HYPERTENSION: ICD-10-CM

## 2020-10-20 DIAGNOSIS — E78.5 HYPERLIPIDEMIA, UNSPECIFIED HYPERLIPIDEMIA TYPE: ICD-10-CM

## 2020-10-20 DIAGNOSIS — E11.9 DM TYPE 2, GOAL HBA1C < 7% (HCC): ICD-10-CM

## 2020-10-20 RX ORDER — POTASSIUM CHLORIDE 20 MEQ/1
TABLET, EXTENDED RELEASE ORAL
Qty: 90 TAB | Refills: 0 | Status: SHIPPED | OUTPATIENT
Start: 2020-10-20 | End: 2021-02-01

## 2020-10-20 RX ORDER — LISINOPRIL 5 MG/1
TABLET ORAL
Qty: 90 TAB | Refills: 0 | Status: SHIPPED | OUTPATIENT
Start: 2020-10-20 | End: 2020-10-24

## 2020-10-20 RX ORDER — ERGOCALCIFEROL 1.25 MG/1
CAPSULE ORAL
Qty: 12 CAP | Refills: 0 | Status: SHIPPED | OUTPATIENT
Start: 2020-10-20 | End: 2021-02-01

## 2020-10-20 RX ORDER — GEMFIBROZIL 600 MG/1
TABLET, FILM COATED ORAL
Qty: 180 TAB | Refills: 0 | Status: SHIPPED | OUTPATIENT
Start: 2020-10-20 | End: 2021-02-01

## 2020-10-20 RX ORDER — METOPROLOL TARTRATE 25 MG/1
TABLET, FILM COATED ORAL
Qty: 60 TAB | Refills: 0 | Status: SHIPPED | OUTPATIENT
Start: 2020-10-20 | End: 2020-10-20 | Stop reason: SDUPTHER

## 2020-10-20 RX ORDER — METOPROLOL TARTRATE 25 MG/1
TABLET, FILM COATED ORAL
Qty: 180 TAB | Refills: 0 | Status: SHIPPED | OUTPATIENT
Start: 2020-10-20 | End: 2021-02-01

## 2020-10-22 ENCOUNTER — HOSPITAL ENCOUNTER (INPATIENT)
Age: 40
LOS: 2 days | Discharge: HOME | DRG: 420 | End: 2020-10-24
Attending: EMERGENCY MEDICINE | Admitting: FAMILY MEDICINE
Payer: MEDICAID

## 2020-10-22 ENCOUNTER — APPOINTMENT (OUTPATIENT)
Dept: GENERAL RADIOLOGY | Age: 40
DRG: 420 | End: 2020-10-22
Attending: EMERGENCY MEDICINE
Payer: MEDICAID

## 2020-10-22 DIAGNOSIS — R11.2 INTRACTABLE VOMITING WITH NAUSEA, UNSPECIFIED VOMITING TYPE: Primary | ICD-10-CM

## 2020-10-22 DIAGNOSIS — N17.9 ACUTE KIDNEY INJURY (HCC): ICD-10-CM

## 2020-10-22 DIAGNOSIS — E10.10 DIABETIC KETOACIDOSIS WITHOUT COMA ASSOCIATED WITH TYPE 1 DIABETES MELLITUS (HCC): ICD-10-CM

## 2020-10-22 LAB
ADMINISTERED INITIALS, ADMINIT: NORMAL
ALBUMIN SERPL-MCNC: 2.9 G/DL (ref 3.5–5)
ALBUMIN SERPL-MCNC: 3.1 G/DL (ref 3.5–5)
ALBUMIN/GLOB SERPL: 0.7 {RATIO} (ref 1.1–2.2)
ALBUMIN/GLOB SERPL: 0.7 {RATIO} (ref 1.1–2.2)
ALP SERPL-CCNC: 121 U/L (ref 45–117)
ALP SERPL-CCNC: 131 U/L (ref 45–117)
ALT SERPL-CCNC: 29 U/L (ref 12–78)
ALT SERPL-CCNC: 33 U/L (ref 12–78)
ANION GAP SERPL CALC-SCNC: 25 MMOL/L (ref 5–15)
ANION GAP SERPL CALC-SCNC: 27 MMOL/L (ref 5–15)
ANION GAP SERPL CALC-SCNC: 30 MMOL/L (ref 5–15)
ANION GAP SERPL CALC-SCNC: 32 MMOL/L (ref 5–15)
APAP SERPL-MCNC: 3 UG/ML (ref 10–30)
APPEARANCE UR: CLEAR
AST SERPL-CCNC: 19 U/L (ref 15–37)
AST SERPL-CCNC: 26 U/L (ref 15–37)
ATRIAL RATE: 113 BPM
BACTERIA URNS QL MICRO: NEGATIVE /HPF
BASOPHILS # BLD: 0 K/UL (ref 0–0.1)
BASOPHILS NFR BLD: 0 % (ref 0–1)
BILIRUB SERPL-MCNC: 0.4 MG/DL (ref 0.2–1)
BILIRUB SERPL-MCNC: 0.6 MG/DL (ref 0.2–1)
BILIRUB UR QL CFM: NEGATIVE
BUN SERPL-MCNC: 39 MG/DL (ref 6–20)
BUN SERPL-MCNC: 43 MG/DL (ref 6–20)
BUN SERPL-MCNC: 46 MG/DL (ref 6–20)
BUN SERPL-MCNC: 51 MG/DL (ref 6–20)
BUN/CREAT SERPL: 20 (ref 12–20)
BUN/CREAT SERPL: 23 (ref 12–20)
BUN/CREAT SERPL: 24 (ref 12–20)
BUN/CREAT SERPL: 26 (ref 12–20)
CALCIUM SERPL-MCNC: 6.1 MG/DL (ref 8.5–10.1)
CALCIUM SERPL-MCNC: 8.2 MG/DL (ref 8.5–10.1)
CALCIUM SERPL-MCNC: 9.3 MG/DL (ref 8.5–10.1)
CALCIUM SERPL-MCNC: 9.8 MG/DL (ref 8.5–10.1)
CALCULATED P AXIS, ECG09: 82 DEGREES
CALCULATED R AXIS, ECG10: 90 DEGREES
CALCULATED T AXIS, ECG11: 78 DEGREES
CHLORIDE SERPL-SCNC: 106 MMOL/L (ref 97–108)
CHLORIDE SERPL-SCNC: 112 MMOL/L (ref 97–108)
CHLORIDE SERPL-SCNC: 89 MMOL/L (ref 97–108)
CHLORIDE SERPL-SCNC: 93 MMOL/L (ref 97–108)
CK MB CFR SERPL CALC: 2.8 % (ref 0–2.5)
CK MB SERPL-MCNC: 2.6 NG/ML (ref 5–25)
CK SERPL-CCNC: 92 U/L (ref 39–308)
CO2 SERPL-SCNC: 12 MMOL/L (ref 21–32)
CO2 SERPL-SCNC: 14 MMOL/L (ref 21–32)
CO2 SERPL-SCNC: 6 MMOL/L (ref 21–32)
CO2 SERPL-SCNC: 9 MMOL/L (ref 21–32)
COLOR UR: ABNORMAL
COMMENT, HOLDF: NORMAL
COMMENT, HOLDF: NORMAL
CREAT SERPL-MCNC: 1.49 MG/DL (ref 0.7–1.3)
CREAT SERPL-MCNC: 2.04 MG/DL (ref 0.7–1.3)
CREAT SERPL-MCNC: 2.14 MG/DL (ref 0.7–1.3)
CREAT SERPL-MCNC: 2.14 MG/DL (ref 0.7–1.3)
D50 ADMINISTERED, D50ADM: 0 ML
D50 ORDER, D50ORD: 0 ML
DIAGNOSIS, 93000: NORMAL
DIFFERENTIAL METHOD BLD: ABNORMAL
EOSINOPHIL # BLD: 0 K/UL (ref 0–0.4)
EOSINOPHIL NFR BLD: 0 % (ref 0–7)
EPITH CASTS URNS QL MICRO: ABNORMAL /LPF
ERYTHROCYTE [DISTWIDTH] IN BLOOD BY AUTOMATED COUNT: 12.8 % (ref 11.5–14.5)
ETHANOL SERPL-MCNC: <10 MG/DL
GLOBULIN SER CALC-MCNC: 3.9 G/DL (ref 2–4)
GLOBULIN SER CALC-MCNC: 4.2 G/DL (ref 2–4)
GLSCOM COMMENTS: NORMAL
GLUCOSE BLD STRIP.AUTO-MCNC: 270 MG/DL (ref 65–100)
GLUCOSE BLD STRIP.AUTO-MCNC: 312 MG/DL (ref 65–100)
GLUCOSE BLD STRIP.AUTO-MCNC: 406 MG/DL (ref 65–100)
GLUCOSE BLD STRIP.AUTO-MCNC: 496 MG/DL (ref 65–100)
GLUCOSE BLD STRIP.AUTO-MCNC: 573 MG/DL (ref 65–100)
GLUCOSE BLD STRIP.AUTO-MCNC: 578 MG/DL (ref 65–100)
GLUCOSE BLD STRIP.AUTO-MCNC: >600 MG/DL (ref 65–100)
GLUCOSE SERPL-MCNC: 303 MG/DL (ref 65–100)
GLUCOSE SERPL-MCNC: 442 MG/DL (ref 65–100)
GLUCOSE SERPL-MCNC: 658 MG/DL (ref 65–100)
GLUCOSE SERPL-MCNC: 712 MG/DL (ref 65–100)
GLUCOSE UR STRIP.AUTO-MCNC: >1000 MG/DL
GLUCOSE, GLC: 270 MG/DL
GLUCOSE, GLC: 312 MG/DL
GLUCOSE, GLC: 406 MG/DL
GLUCOSE, GLC: 496 MG/DL
GLUCOSE, GLC: 573 MG/DL
GLUCOSE, GLC: 578 MG/DL
GLUCOSE, GLC: 601 MG/DL
HCT VFR BLD AUTO: 40.3 % (ref 36.6–50.3)
HGB BLD-MCNC: 13.7 G/DL (ref 12.1–17)
HGB UR QL STRIP: ABNORMAL
HIGH TARGET, HITG: 250 MG/DL
IMM GRANULOCYTES # BLD AUTO: 0.1 K/UL (ref 0–0.04)
IMM GRANULOCYTES NFR BLD AUTO: 1 % (ref 0–0.5)
INSULIN ADMINSTERED, INSADM: 10.8 UNITS/HOUR
INSULIN ADMINSTERED, INSADM: 15.4 UNITS/HOUR
INSULIN ADMINSTERED, INSADM: 16.8 UNITS/HOUR
INSULIN ADMINSTERED, INSADM: 17.6 UNITS/HOUR
INSULIN ADMINSTERED, INSADM: 20.7 UNITS/HOUR
INSULIN ADMINSTERED, INSADM: 20.8 UNITS/HOUR
INSULIN ADMINSTERED, INSADM: 21.8 UNITS/HOUR
INSULIN ORDER, INSORD: 10.8 UNITS/HOUR
INSULIN ORDER, INSORD: 15.4 UNITS/HOUR
INSULIN ORDER, INSORD: 16.8 UNITS/HOUR
INSULIN ORDER, INSORD: 17.6 UNITS/HOUR
INSULIN ORDER, INSORD: 20.7 UNITS/HOUR
INSULIN ORDER, INSORD: 20.8 UNITS/HOUR
INSULIN ORDER, INSORD: 21.8 UNITS/HOUR
KETONES UR QL STRIP.AUTO: >80 MG/DL
LACTATE SERPL-SCNC: 3 MMOL/L (ref 0.4–2)
LEUKOCYTE ESTERASE UR QL STRIP.AUTO: NEGATIVE
LIPASE SERPL-CCNC: 80 U/L (ref 73–393)
LOW TARGET, LOT: 150 MG/DL
LYMPHOCYTES # BLD: 1.3 K/UL (ref 0.8–3.5)
LYMPHOCYTES NFR BLD: 11 % (ref 12–49)
MAGNESIUM SERPL-MCNC: 1.8 MG/DL (ref 1.6–2.4)
MAGNESIUM SERPL-MCNC: 2.5 MG/DL (ref 1.6–2.4)
MAGNESIUM SERPL-MCNC: 2.5 MG/DL (ref 1.6–2.4)
MAGNESIUM SERPL-MCNC: 2.6 MG/DL (ref 1.6–2.4)
MCH RBC QN AUTO: 30.9 PG (ref 26–34)
MCHC RBC AUTO-ENTMCNC: 34 G/DL (ref 30–36.5)
MCV RBC AUTO: 90.8 FL (ref 80–99)
MINUTES UNTIL NEXT BG, NBG: 60 MIN
MONOCYTES # BLD: 0.4 K/UL (ref 0–1)
MONOCYTES NFR BLD: 3 % (ref 5–13)
MUCOUS THREADS URNS QL MICRO: ABNORMAL /LPF
MULTIPLIER, MUL: 0.02
MULTIPLIER, MUL: 0.03
MULTIPLIER, MUL: 0.04
MULTIPLIER, MUL: 0.05
MULTIPLIER, MUL: 0.06
MULTIPLIER, MUL: 0.07
MULTIPLIER, MUL: 0.08
NEUTS SEG # BLD: 10.1 K/UL (ref 1.8–8)
NEUTS SEG NFR BLD: 85 % (ref 32–75)
NITRITE UR QL STRIP.AUTO: NEGATIVE
NRBC # BLD: 0 K/UL (ref 0–0.01)
NRBC BLD-RTO: 0 PER 100 WBC
ORDER INITIALS, ORDINIT: NORMAL
P-R INTERVAL, ECG05: 138 MS
PH UR STRIP: 5.5 [PH] (ref 5–8)
PHOSPHATE SERPL-MCNC: 7.7 MG/DL (ref 2.6–4.7)
PHOSPHATE SERPL-MCNC: 7.7 MG/DL (ref 2.6–4.7)
PLATELET # BLD AUTO: 465 K/UL (ref 150–400)
PMV BLD AUTO: 11.6 FL (ref 8.9–12.9)
POTASSIUM SERPL-SCNC: 2.3 MMOL/L (ref 3.5–5.1)
POTASSIUM SERPL-SCNC: 3 MMOL/L (ref 3.5–5.1)
POTASSIUM SERPL-SCNC: 4.3 MMOL/L (ref 3.5–5.1)
POTASSIUM SERPL-SCNC: 5.5 MMOL/L (ref 3.5–5.1)
PROT SERPL-MCNC: 6.8 G/DL (ref 6.4–8.2)
PROT SERPL-MCNC: 7.3 G/DL (ref 6.4–8.2)
PROT UR STRIP-MCNC: 100 MG/DL
Q-T INTERVAL, ECG07: 356 MS
QRS DURATION, ECG06: 94 MS
QTC CALCULATION (BEZET), ECG08: 488 MS
RBC # BLD AUTO: 4.44 M/UL (ref 4.1–5.7)
RBC #/AREA URNS HPF: ABNORMAL /HPF (ref 0–5)
SAMPLES BEING HELD,HOLD: NORMAL
SAMPLES BEING HELD,HOLD: NORMAL
SERVICE CMNT-IMP: ABNORMAL
SODIUM SERPL-SCNC: 132 MMOL/L (ref 136–145)
SODIUM SERPL-SCNC: 133 MMOL/L (ref 136–145)
SODIUM SERPL-SCNC: 145 MMOL/L (ref 136–145)
SODIUM SERPL-SCNC: 145 MMOL/L (ref 136–145)
SP GR UR REFRACTOMETRY: 1.02 (ref 1–1.03)
TROPONIN I SERPL-MCNC: <0.05 NG/ML
TROPONIN I SERPL-MCNC: <0.05 NG/ML
UROBILINOGEN UR QL STRIP.AUTO: 0.2 EU/DL (ref 0.2–1)
VENTRICULAR RATE, ECG03: 113 BPM
WBC # BLD AUTO: 12 K/UL (ref 4.1–11.1)
WBC URNS QL MICRO: ABNORMAL /HPF (ref 0–4)

## 2020-10-22 PROCEDURE — 74011000250 HC RX REV CODE- 250: Performed by: NURSE PRACTITIONER

## 2020-10-22 PROCEDURE — 74011636637 HC RX REV CODE- 636/637: Performed by: EMERGENCY MEDICINE

## 2020-10-22 PROCEDURE — 82553 CREATINE MB FRACTION: CPT

## 2020-10-22 PROCEDURE — 74011000258 HC RX REV CODE- 258: Performed by: EMERGENCY MEDICINE

## 2020-10-22 PROCEDURE — 82962 GLUCOSE BLOOD TEST: CPT

## 2020-10-22 PROCEDURE — 36415 COLL VENOUS BLD VENIPUNCTURE: CPT

## 2020-10-22 PROCEDURE — 74011250636 HC RX REV CODE- 250/636: Performed by: FAMILY MEDICINE

## 2020-10-22 PROCEDURE — 85025 COMPLETE CBC W/AUTO DIFF WBC: CPT

## 2020-10-22 PROCEDURE — 93005 ELECTROCARDIOGRAM TRACING: CPT

## 2020-10-22 PROCEDURE — 87040 BLOOD CULTURE FOR BACTERIA: CPT

## 2020-10-22 PROCEDURE — 74011250636 HC RX REV CODE- 250/636: Performed by: EMERGENCY MEDICINE

## 2020-10-22 PROCEDURE — 96374 THER/PROPH/DIAG INJ IV PUSH: CPT

## 2020-10-22 PROCEDURE — 80307 DRUG TEST PRSMV CHEM ANLYZR: CPT

## 2020-10-22 PROCEDURE — 81001 URINALYSIS AUTO W/SCOPE: CPT

## 2020-10-22 PROCEDURE — 83735 ASSAY OF MAGNESIUM: CPT

## 2020-10-22 PROCEDURE — 82550 ASSAY OF CK (CPK): CPT

## 2020-10-22 PROCEDURE — 84484 ASSAY OF TROPONIN QUANT: CPT

## 2020-10-22 PROCEDURE — 80053 COMPREHEN METABOLIC PANEL: CPT

## 2020-10-22 PROCEDURE — 65660000000 HC RM CCU STEPDOWN

## 2020-10-22 PROCEDURE — 74011250637 HC RX REV CODE- 250/637: Performed by: FAMILY MEDICINE

## 2020-10-22 PROCEDURE — 84100 ASSAY OF PHOSPHORUS: CPT

## 2020-10-22 PROCEDURE — 80047 BASIC METABLC PNL IONIZED CA: CPT

## 2020-10-22 PROCEDURE — 83690 ASSAY OF LIPASE: CPT

## 2020-10-22 PROCEDURE — 83605 ASSAY OF LACTIC ACID: CPT

## 2020-10-22 PROCEDURE — 99284 EMERGENCY DEPT VISIT MOD MDM: CPT

## 2020-10-22 RX ORDER — MORPHINE SULFATE 2 MG/ML
1 INJECTION, SOLUTION INTRAMUSCULAR; INTRAVENOUS
Status: DISCONTINUED | OUTPATIENT
Start: 2020-10-22 | End: 2020-10-24 | Stop reason: HOSPADM

## 2020-10-22 RX ORDER — ONDANSETRON 2 MG/ML
4 INJECTION INTRAMUSCULAR; INTRAVENOUS ONCE
Status: COMPLETED | OUTPATIENT
Start: 2020-10-22 | End: 2020-10-22

## 2020-10-22 RX ORDER — SODIUM CHLORIDE 9 MG/ML
150 INJECTION, SOLUTION INTRAVENOUS CONTINUOUS
Status: DISCONTINUED | OUTPATIENT
Start: 2020-10-22 | End: 2020-10-23

## 2020-10-22 RX ORDER — SODIUM CHLORIDE 0.9 % (FLUSH) 0.9 %
5-40 SYRINGE (ML) INJECTION AS NEEDED
Status: DISCONTINUED | OUTPATIENT
Start: 2020-10-22 | End: 2020-10-24 | Stop reason: HOSPADM

## 2020-10-22 RX ORDER — DEXTROSE 50 % IN WATER (D50W) INTRAVENOUS SYRINGE
25-50 AS NEEDED
Status: DISCONTINUED | OUTPATIENT
Start: 2020-10-22 | End: 2020-10-23

## 2020-10-22 RX ORDER — ACETAMINOPHEN 325 MG/1
650 TABLET ORAL
Status: DISCONTINUED | OUTPATIENT
Start: 2020-10-22 | End: 2020-10-24 | Stop reason: HOSPADM

## 2020-10-22 RX ORDER — ONDANSETRON 2 MG/ML
4 INJECTION INTRAMUSCULAR; INTRAVENOUS
Status: DISCONTINUED | OUTPATIENT
Start: 2020-10-22 | End: 2020-10-22

## 2020-10-22 RX ORDER — METOPROLOL TARTRATE 5 MG/5ML
2.5 INJECTION INTRAVENOUS ONCE
Status: COMPLETED | OUTPATIENT
Start: 2020-10-22 | End: 2020-10-22

## 2020-10-22 RX ORDER — ONDANSETRON 2 MG/ML
4 INJECTION INTRAMUSCULAR; INTRAVENOUS
Status: DISCONTINUED | OUTPATIENT
Start: 2020-10-22 | End: 2020-10-24 | Stop reason: HOSPADM

## 2020-10-22 RX ORDER — POTASSIUM CHLORIDE AND SODIUM CHLORIDE 450; 150 MG/100ML; MG/100ML
INJECTION, SOLUTION INTRAVENOUS CONTINUOUS
Status: DISCONTINUED | OUTPATIENT
Start: 2020-10-22 | End: 2020-10-22

## 2020-10-22 RX ORDER — INSULIN LISPRO 100 [IU]/ML
INJECTION, SOLUTION INTRAVENOUS; SUBCUTANEOUS
Status: DISCONTINUED | OUTPATIENT
Start: 2020-10-22 | End: 2020-10-22

## 2020-10-22 RX ORDER — METOCLOPRAMIDE HYDROCHLORIDE 5 MG/ML
5 INJECTION INTRAMUSCULAR; INTRAVENOUS EVERY 6 HOURS
Status: DISCONTINUED | OUTPATIENT
Start: 2020-10-22 | End: 2020-10-23

## 2020-10-22 RX ORDER — SODIUM CHLORIDE 0.9 % (FLUSH) 0.9 %
5-40 SYRINGE (ML) INJECTION EVERY 8 HOURS
Status: DISCONTINUED | OUTPATIENT
Start: 2020-10-22 | End: 2020-10-24 | Stop reason: HOSPADM

## 2020-10-22 RX ORDER — MAGNESIUM SULFATE 100 %
4 CRYSTALS MISCELLANEOUS AS NEEDED
Status: DISCONTINUED | OUTPATIENT
Start: 2020-10-22 | End: 2020-10-23

## 2020-10-22 RX ORDER — ASPIRIN 300 MG/1
75 SUPPOSITORY RECTAL ONCE
Status: COMPLETED | OUTPATIENT
Start: 2020-10-22 | End: 2020-10-22

## 2020-10-22 RX ORDER — ACETAMINOPHEN 650 MG/1
650 SUPPOSITORY RECTAL
Status: DISCONTINUED | OUTPATIENT
Start: 2020-10-22 | End: 2020-10-24 | Stop reason: HOSPADM

## 2020-10-22 RX ORDER — METOCLOPRAMIDE HYDROCHLORIDE 5 MG/ML
5 INJECTION INTRAMUSCULAR; INTRAVENOUS EVERY 6 HOURS
Status: DISCONTINUED | OUTPATIENT
Start: 2020-10-22 | End: 2020-10-22

## 2020-10-22 RX ADMIN — MORPHINE SULFATE 1 MG: 2 INJECTION, SOLUTION INTRAMUSCULAR; INTRAVENOUS at 20:21

## 2020-10-22 RX ADMIN — ONDANSETRON 4 MG: 2 INJECTION INTRAMUSCULAR; INTRAVENOUS at 23:52

## 2020-10-22 RX ADMIN — SODIUM CHLORIDE 1000 ML: 900 INJECTION, SOLUTION INTRAVENOUS at 13:23

## 2020-10-22 RX ADMIN — METOCLOPRAMIDE 5 MG: 5 INJECTION, SOLUTION INTRAMUSCULAR; INTRAVENOUS at 16:37

## 2020-10-22 RX ADMIN — ONDANSETRON 4 MG: 2 INJECTION INTRAMUSCULAR; INTRAVENOUS at 20:21

## 2020-10-22 RX ADMIN — SODIUM CHLORIDE 1000 ML: 900 INJECTION, SOLUTION INTRAVENOUS at 20:31

## 2020-10-22 RX ADMIN — METOPROLOL TARTRATE 2.5 MG: 1 INJECTION, SOLUTION INTRAVENOUS at 20:36

## 2020-10-22 RX ADMIN — ONDANSETRON 4 MG: 2 INJECTION INTRAMUSCULAR; INTRAVENOUS at 13:18

## 2020-10-22 RX ADMIN — SODIUM CHLORIDE 150 ML/HR: 900 INJECTION, SOLUTION INTRAVENOUS at 16:29

## 2020-10-22 RX ADMIN — SODIUM CHLORIDE 10.8 UNITS/HR: 9 INJECTION, SOLUTION INTRAVENOUS at 17:22

## 2020-10-22 RX ADMIN — SODIUM CHLORIDE 17.6 UNITS/HR: 9 INJECTION, SOLUTION INTRAVENOUS at 22:57

## 2020-10-22 RX ADMIN — ASPIRIN 75 MG: 300 SUPPOSITORY RECTAL at 20:15

## 2020-10-22 RX ADMIN — METOCLOPRAMIDE 5 MG: 5 INJECTION, SOLUTION INTRAMUSCULAR; INTRAVENOUS at 23:53

## 2020-10-22 RX ADMIN — SODIUM CHLORIDE 21.8 UNITS/HR: 9 INJECTION, SOLUTION INTRAVENOUS at 20:30

## 2020-10-22 NOTE — ED NOTES
Assumed care of pt via EMS stretcher. Pt is A&O x 4 and reports CC of hyperglycemia. Pt reports checking his blood sugar this morning (500). Pt states is a type 1 diabetic and have not taken any insulin. Pt reports feeling nauseas and having diarrhea.  EMS report in route to ED [facility] pt had an incontinent episode of stool       1300 Pt EKG delayed due to nurse Pt acuity     1400 Provided pt ice chips     1402 Xray at bedside     1420 Pt had another incontinent episode of stool clean & reposition pt

## 2020-10-22 NOTE — H&P
History & Physical    Primary Care Provider: Amy Yates MD  Source of Information: Patient     History of Presenting Illness:   Maik Johnson is a 42 y.o. male who presents with nausea and vomiting. History was primarily obtained from the patient. Patient reports that he started experiencing nausea and vomiting that started yesterday noon. Patient reports since then he has had multiple episodes of nausea, vomiting, poor appetite, and 1-2 episodes of diarrhea. Patient came to the ER, was found to be in DKA and was requested to be admitted to the hospitalist service. Patient reports mild abdominal pain but no other complaints or problems. Patient reports that he has not had any fever or chills associated with his symptoms. Patient reports he has been taking his insulin as prescribed on a regular basis. Patient denies any other sources of infection. The patient denies any Headache, blurry vision, sore throat, trouble swallowing, trouble with speech, chest pain, SOB, cough, fever, chills,, urinary symptoms, constipation, recent travels, sick contacts, focal or generalized neurological symptoms,  falls, injuries, rashes, contact with COVID-19 diagnosed patients, hematemesis, melena, hemoptysis, hematuria, rashes, denies starting any new medications and denies any other concerns or problems besides as mentioned above. Review of Systems:  A comprehensive review of systems was negative except for that written in the History of Present Illness.      Past Medical History:   Diagnosis Date    Bipolar 1 disorder, depressed [facility] [address]     Bipolar disorder ([facility] [address] [facility]     Depression     Diabetes ([facility] [address]     DKA, type 1 ([facility] [address] 1/27/2013    diagnosed age 23    H/O noncompliance with medical treatment, presenting hazards to health     MRSA (methicillin resistant staph aureus) culture positive     MRSA (methicillin resistant Staphylococcus aureus)     Face    Noncompliance with medication regimen     Second hand smoke exposure     Seizure ([facility] [address]     Seizures ([facility] [address] 2006 or 2007    one episode during prison      Past Surgical History:   Procedure Laterality Date    HX HEENT      top left wisdom tooth    HX ORTHOPAEDIC Left     wrist; MCV    UPPER GI ENDOSCOPY,BIOPSY  11/20/2018          Prior to Admission medications    Medication Sig Start Date End Date Taking? Authorizing Provider   Vitamin D2 1,250 mcg (50,000 unit) capsule Take 1 capsule by mouth once a week 10/20/20   Mya Abdullahi MD   metoprolol tartrate (LOPRESSOR) 25 mg tablet TAKE 1 TABLET BY MOUTH EVERY 12 HOURS 10/20/20   Mya Abdullahi MD   potassium chloride (K-DUR, KLOR-CON) 20 mEq tablet Take 1 tablet by mouth once daily 10/20/20   Mya Abdullahi MD   gemfibroziL (LOPID) 600 mg tablet Take 1 tablet by mouth twice daily 10/20/20   Mya Abdullahi MD   lisinopriL (PRINIVIL, ZESTRIL) 5 mg tablet Take 1 tablet by mouth once daily 10/20/20   Mya Abdullahi MD   sildenafil citrate (VIAGRA) 50 mg tablet Take 1 Tab by mouth as needed (prn). 9/25/20   Mya Abdullahi MD   flash glucose sensor (FreeStyle Connor 14 Day Sensor) kit 6 (14 day) sensors for use with Freestyle Scanner 8/6/20   Deisy Garza MD   furosemide (Lasix) 40 mg tablet Take 1 Tab by mouth daily. 6/3/20   Mya Abdullahi MD   insulin glargine (Lantus Solostar U-100 Insulin) 100 unit/mL (3 mL) inpn adminster 30 units subcut daily 6/3/20   Mya Abdullahi MD   insulin aspart U-100 (NovoLOG Flexpen U-100 Insulin) 100 unit/mL (3 mL) inpn (Novolog or Humalog, whichever more preferred: Inject 5 units with each meal + correction insulin, up to 30 units per day 3/25/20   Deisy Garza MD   pantoprazole (PROTONIX) 40 mg tablet Take 1 Tab by mouth two (2) times a day. 1/27/20   Soren Vásquez MD   tamsulosin (FLOMAX) 0.4 mg capsule Take 1 Cap by mouth daily.  1/6/20   Susy Guo MD     No Known Allergies   Family History   Problem Relation Age of Onset    Diabetes Mother     Diabetes Other         neice, type 1         SOCIAL HISTORY:  Patient resides:  Independently X   Assisted Living    SNF    With family care       Smoking history:   None    Former X   Chronic      Alcohol history:   None    Social X   Chronic      Ambulates:   Independently X   w/cane    w/walker    w/wc    CODE STATUS:  DNR    Full X   Other      Objective:     Physical Exam:     Visit Vitals  /73 (BP 1 Location: Left arm, BP Patient Position: Sitting)   Pulse (!) 113   Temp 98 °F (36.7 °C)   Resp 18   Ht 5' 9\" (1.753 m)   Wt 65.8 kg (145 lb)   SpO2 100%   BMI 21.41 kg/m²      O2 Device: Room air  General : alert x 3, awake, no acute distress, resting in bed, pleasant male, appears to be stated age  [de-identified]: PEERL, EOMI, moist mucus membrane, TM clear  Neck: supple, no JVD, no meningeal signs  Chest: Clear to auscultation bilaterally   CVS: S1 S2 heard, Capillary refill less than 2 seconds  Abd: soft/ Non tender, non distended, BS physiological,   Ext: no clubbing, no cyanosis, no edema, brisk 2+ DP pulses  Neuro/Psych: pleasant mood and affect, CN 2-12 grossly intact, sensory grossly within normal limit, Strength 5/5 in all extremities, DTR 1+ x 4  Skin: warm      EKG: Sinus tachycardia with nonspecific ST changes      Data Review:     Recent Days:  Recent Labs     10/22/20  1300   WBC 12.0*   HGB 13.7   HCT 40.3   *     Recent Labs     10/22/20  1300   *   K 4.3   CL 89*   CO2 12*   *   BUN 43*   CREA 2.14*   CA 9.8   ALB 3.1*   ALT 33     No results for input(s): PH, PCO2, PO2, HCO3, FIO2 in the last 72 hours.     24 Hour Results:  Recent Results (from the past 24 hour(s))   CBC WITH AUTOMATED DIFF    Collection Time: 10/22/20  1:00 PM   Result Value Ref Range    WBC 12.0 (H) 4.1 - 11.1 K/uL    RBC 4.44 4.10 - 5.70 M/uL    HGB 13.7 12.1 - 17.0 g/dL    HCT 40.3 36.6 - 50.3 %    MCV 90.8 80.0 - 99.0 FL    MCH 30.9 26.0 - 34.0 PG    MCHC 34.0 30.0 - 36.5 g/dL    RDW 12.8 11.5 - 14.5 %    PLATELET 563 (H) 942 - 400 K/uL    MPV 11.6 8.9 - 12.9 FL    NRBC 0.0 0  WBC    ABSOLUTE NRBC 0.00 0.00 - 0.01 K/uL    NEUTROPHILS 85 (H) 32 - 75 %    LYMPHOCYTES 11 (L) 12 - 49 %    MONOCYTES 3 (L) 5 - 13 %    EOSINOPHILS 0 0 - 7 %    BASOPHILS 0 0 - 1 %    IMMATURE GRANULOCYTES 1 (H) 0.0 - 0.5 %    ABS. NEUTROPHILS 10.1 (H) 1.8 - 8.0 K/UL    ABS. LYMPHOCYTES 1.3 0.8 - 3.5 K/UL    ABS. MONOCYTES 0.4 0.0 - 1.0 K/UL    ABS. EOSINOPHILS 0.0 0.0 - 0.4 K/UL    ABS. BASOPHILS 0.0 0.0 - 0.1 K/UL    ABS. IMM. GRANS. 0.1 (H) 0.00 - 0.04 K/UL    DF AUTOMATED     SAMPLES BEING HELD    Collection Time: 10/22/20  1:00 PM   Result Value Ref Range    SAMPLES BEING HELD MELISSA,RE     COMMENT        Add-on orders for these samples will be processed based on acceptable specimen integrity and analyte stability, which may vary by analyte. LIPASE    Collection Time: 10/22/20  1:00 PM   Result Value Ref Range    Lipase 80 73 - 695 U/L   METABOLIC PANEL, COMPREHENSIVE    Collection Time: 10/22/20  1:00 PM   Result Value Ref Range    Sodium 133 (L) 136 - 145 mmol/L    Potassium 4.3 3.5 - 5.1 mmol/L    Chloride 89 (L) 97 - 108 mmol/L    CO2 12 (LL) 21 - 32 mmol/L    Anion gap 32 (H) 5 - 15 mmol/L    Glucose 658 (HH) 65 - 100 mg/dL    BUN 43 (H) 6 - 20 MG/DL    Creatinine 2.14 (H) 0.70 - 1.30 MG/DL    BUN/Creatinine ratio 20 12 - 20      GFR est AA 42 (L) >60 ml/min/1.73m2    GFR est non-AA 35 (L) >60 ml/min/1.73m2    Calcium 9.8 8.5 - 10.1 MG/DL    Bilirubin, total 0.4 0.2 - 1.0 MG/DL    ALT (SGPT) 33 12 - 78 U/L    AST (SGOT) 19 15 - 37 U/L    Alk.  phosphatase 131 (H) 45 - 117 U/L    Protein, total 7.3 6.4 - 8.2 g/dL    Albumin 3.1 (L) 3.5 - 5.0 g/dL    Globulin 4.2 (H) 2.0 - 4.0 g/dL    A-G Ratio 0.7 (L) 1.1 - 2.2     EKG, 12 LEAD, INITIAL    Collection Time: 10/22/20  1:23 PM   Result Value Ref Range    Ventricular Rate 113 BPM    Atrial Rate 113 BPM    P-R Interval 138 ms    QRS Duration 94 ms    Q-T Interval 356 ms    QTC Calculation (Bezet) 488 ms    Calculated P Axis 82 degrees    Calculated R Axis 90 degrees    Calculated T Axis 78 degrees    Diagnosis       Sinus tachycardia  Biatrial enlargement  Rightward axis  Pulmonary disease pattern  Incomplete right bundle branch block  When compared with ECG of 07-MAR-2020 14:11,  Incomplete right bundle branch block has replaced Nonspecific   intraventricular block     URINALYSIS W/ RFLX MICROSCOPIC    Collection Time: 10/22/20  2:23 PM   Result Value Ref Range    Color YELLOW/STRAW      Appearance CLEAR CLEAR      Specific gravity 1.024 1.003 - 1.030      pH (UA) 5.5 5.0 - 8.0      Protein 100 (A) NEG mg/dL    Glucose >1,000 (A) NEG mg/dL    Ketone >80 (A) NEG mg/dL    Blood SMALL (A) NEG      Urobilinogen 0.2 0.2 - 1.0 EU/dL    Nitrites Negative NEG      Leukocyte Esterase Negative NEG     BILIRUBIN, CONFIRM    Collection Time: 10/22/20  2:23 PM   Result Value Ref Range    Bilirubin UA, confirm Negative NEG     URINE MICROSCOPIC ONLY    Collection Time: 10/22/20  2:23 PM   Result Value Ref Range    WBC 0-4 0 - 4 /hpf    RBC 0-5 0 - 5 /hpf    Epithelial cells FEW FEW /lpf    Bacteria Negative NEG /hpf    Mucus 1+ (A) NEG /lpf   GLUCOSE, POC    Collection Time: 10/22/20  3:42 PM   Result Value Ref Range    Glucose (POC) >600 (HH) 65 - 100 mg/dL    Performed by [facility]    GLUCOSE, POC    Collection Time: 10/22/20  3:48 PM   Result Value Ref Range    Glucose (POC) >600 (HH) 65 - 100 mg/dL    Performed by [facility]          Imaging:   No results found.   Assessment:     Diabetic ketoacidosis: Patient will be admitted on a telemetry bed, aggressive IV hydration, insulin GTT, BMP every 4 hours, once gap closes will switch patient to schedule insulin, CT of the abdomen pelvis to rule out any occult infections, replace electrolytes, IV antiemetics, supportive care and close monitoring, diabetic teaching [place] once patient more stable, hemoglobin A1c and reassess as needed, further intervention per hospital course, continue to monitor    FELECIA: Likely secondary to above, IV hydration, avoid nephrotoxic medication, renally dose all medication, supportive care and close monitoring, further intervention per hospital course, reassess as needed, if creatinine on apprise may consider getting nephrology consult, monitor    Depression: Patient denies any suicidal homicidal ideation, continue monitor, reassess as needed    GI DVT prophylaxis: Patient will be on heparin             Signed By: Moni Zapata MD     October 22, 2020

## 2020-10-22 NOTE — PROGRESS NOTES
10/22/20 1810   Vitals   Temp 97.7 °F (36.5 °C)   Temp Source Oral   Pulse (Heart Rate) (!) 122   Heart Rate Source Monitor   Resp Rate 28   O2 Sat (%) 100 %   Level of Consciousness Alert   /75   MAP (Calculated) 91   BP 1 Location Left arm   BP 1 Method Automatic   BP Patient Position At rest   MEWS Score 4   Alarms Set and Audible Cardiac alarms;Pulse ox alarms; Respiratory alarms   Box Number Hardwire   Electrodes Replaced No   Pain 1   Pain Scale 1 Numeric (0 - 10)   Pain Intensity 1 7   Patient Stated Pain Goal 2   Pain Onset 1 Acute   Pain Location 1 Chest   Pain Description 1 Constant   Oxygen Therapy   O2 Device Room air   MEWS = 4 d/t continued tachycardia.  Will f/u with admitting MD.

## 2020-10-22 NOTE — PROGRESS NOTES
6:35 PM Notified Dr. Genie Ahumada of patient's c/o chest pain on arrival to unit. Patient rating pain 7/10 in the center of his chest. Patient states he believes his chest pain is related to on going dry heaving from feeling so nauseous and that it started \"this afternoon. \" MD aware EKG completed and on chart. MD also aware patient c/w tachycardia with HR 120s. Orders received for blood cultures, lactic, and cardiac enzymes. MD states Sunil Bartlett has been ordered. MD states he will place orders for pain medication. Primary RN, Ana Rocha, updated.

## 2020-10-22 NOTE — ED NOTES
Bedside and Verbal shift change report given to Kelby Milligan  (oncoming nurse) by Derrek Lawler  (offgoing nurse). Report included the following information SBAR, Kardex, ED Summary, [facility] and Recent Results.

## 2020-10-22 NOTE — PROGRESS NOTES
1800H TRANSFER - IN REPORT:    Verbal report received from [facility] (name) on Dorita Boyer  being received from ER (unit) for routine progression of care      Report consisted of patients Situation, Background, Assessment and   Recommendations(SBAR). Information from the following report(s) SBAR, Kardex, ED Summary, Intake/Output, MAR and Recent Results was reviewed with the receiving nurse. Opportunity for questions and clarification was provided. Assessment completed upon patients arrival to unit and care assumed. Primary Nurse Kayleigh Singh and Loretta Whatley RN performed a dual skin assessment on this patient No impairment noted  Corey score is 12     1815H- ( + ) Chest pain Pain scale of 7/10. 12 L EKG was done. Informed to Hospitalist on board. 1900H- Bedside and Verbal shift change report given to TRISHA (oncoming nurse) by Radha Hodge (offgoing nurse). Report included the following information SBAR, Kardex, ED Summary, Procedure Summary, Intake/Output, MAR and Recent Results.

## 2020-10-22 NOTE — PROGRESS NOTES
Patient complaining of chest pain, unlikely cardiac, stat EKG, aspirin, cycle troponins, echocardiogram, telemetry monitoring, supportive care and close monitoring, further intervention per hospital course, reassess as needed, may consider getting a cardiology consult in the morning

## 2020-10-22 NOTE — ED PROVIDER NOTES
EMERGENCY DEPARTMENT HISTORY AND PHYSICAL EXAM      Date: 10/22/2020  Patient Name: Chelle Pruitt    History of Presenting Illness     Chief Complaint   Patient presents with    High Blood Sugar       History Provided By: Patient    HPI: Chelle Pruitt, 42 y.o. male presents to the ED with cc of nausea and vomiting. Patient has a history of type 1 diabetes. Patient takes Lantus. Patient reports symptoms began yesterday approximately 6. Patient reports multiple episodes of emesis. Did have an episode of diarrhea in the ED. No fevers or chills. Denies chest pain or shortness of breath, does report a sharp pain in his abdomen after vomiting. No abdominal pain. History of DKA, reports the symptoms feel similar. Patient denies fevers or chills, denies alcohol use or drug use including marijuana. Does smoke cigarettes. There are no other complaints, changes, or physical findings at this time. PCP: Hazel Anderson MD    No current facility-administered medications on file prior to encounter. Current Outpatient Medications on File Prior to Encounter   Medication Sig Dispense Refill    Vitamin D2 1,250 mcg (50,000 unit) capsule Take 1 capsule by mouth once a week 12 Cap 0    metoprolol tartrate (LOPRESSOR) 25 mg tablet TAKE 1 TABLET BY MOUTH EVERY 12 HOURS 180 Tab 0    potassium chloride (K-DUR, KLOR-CON) 20 mEq tablet Take 1 tablet by mouth once daily 90 Tab 0    gemfibroziL (LOPID) 600 mg tablet Take 1 tablet by mouth twice daily 180 Tab 0    lisinopriL (PRINIVIL, ZESTRIL) 5 mg tablet Take 1 tablet by mouth once daily 90 Tab 0    sildenafil citrate (VIAGRA) 50 mg tablet Take 1 Tab by mouth as needed (prn). 10 Tab 2    flash glucose sensor (FreeStyle Connor 14 Day Sensor) kit 6 (14 day) sensors for use with Freestyle Scanner 6 Kit 3    furosemide (Lasix) 40 mg tablet Take 1 Tab by mouth daily.  30 Tab 1    insulin glargine (Lantus Solostar U-100 Insulin) 100 unit/mL (3 mL) inpn adminster 30 units subcut daily 10 Adjustable Dose Pre-filled Pen Syringe 3    insulin aspart U-100 (NovoLOG Flexpen U-100 Insulin) 100 unit/mL (3 mL) inpn (Novolog or Humalog, whichever more preferred: Inject 5 units with each meal + correction insulin, up to 30 units per day 10 Adjustable Dose Pre-filled Pen Syringe 3    pantoprazole (PROTONIX) 40 mg tablet Take 1 Tab by mouth two (2) times a day. 60 Tab 0    tamsulosin (FLOMAX) 0.4 mg capsule Take 1 Cap by mouth daily. 30 Cap 1       Past History     Past Medical History:  Past Medical History:   Diagnosis Date    Bipolar 1 disorder, depressed ([address]     [facility]     Depression     Diabetes ([facility]     DKA, type 1 ([facility] 2013    diagnosed age 23    H/O noncompliance with medical treatment, presenting hazards to health     MRSA (methicillin resistant staph aureus) culture positive     MRSA (methicillin resistant Staphylococcus aureus)     Face    Noncompliance with medication regimen     Second hand smoke exposure     Seizure ([address]     Seizures ([address]  or     one episode during custodial       Past Surgical History:  Past Surgical History:   Procedure Laterality Date    HX HEENT      top left wisdom tooth    HX ORTHOPAEDIC Left     wrist; MCV    UPPER GI ENDOSCOPY,BIOPSY  2018            Family History:  Family History   Problem Relation Age of Onset    Diabetes Mother     Diabetes Other         neice, type 1        Social History:  Social History     Tobacco Use    Smoking status: Former Smoker     Packs/day: 0.10     Years: 16.00     Pack years: 1.60     Types: Cigarettes     Last attempt to quit: 2020     Years since quittin.6    Smokeless tobacco: Never Used   Substance Use Topics    Alcohol use: No    Drug use: No       Allergies:  No Known Allergies      Review of Systems   Review of Systems   Constitutional: Negative for fever. HENT: Negative for voice change.     Eyes: Negative for pain and redness. Respiratory: Negative for cough and chest tightness. Cardiovascular: Negative for chest pain and leg swelling. Gastrointestinal: Positive for abdominal pain, nausea and vomiting. Negative for diarrhea. Genitourinary: Negative for hematuria. Musculoskeletal: Negative for gait problem. Skin: Negative for color change, pallor and rash. Neurological: Negative for facial asymmetry, weakness and headaches. Hematological: Does not bruise/bleed easily. Psychiatric/Behavioral: Negative for behavioral problems. All other systems reviewed and are negative. Physical Exam   Physical Exam  Vitals signs and nursing note reviewed. Constitutional:       Comments: 46-year-old male, resting in bed, no acute distress occasionally vomiting   HENT:      Head: Normocephalic. Right Ear: External ear normal.      Left Ear: External ear normal.      Nose: Nose normal.   Eyes:      Conjunctiva/sclera: Conjunctivae normal.   Cardiovascular:      Rate and Rhythm: Normal rate and regular rhythm. Heart sounds: No murmur. No friction rub. No gallop. Pulmonary:      Effort: Pulmonary effort is normal.      Breath sounds: Normal breath sounds. No wheezing, rhonchi or rales. Abdominal:      Palpations: Abdomen is soft. Tenderness: There is no abdominal tenderness. Musculoskeletal: Normal range of motion. Skin:     General: Skin is warm. Capillary Refill: Capillary refill takes less than 2 seconds. Neurological:      Mental Status: He is alert. Mental status is at baseline.    Psychiatric:         Mood and Affect: Mood normal.         Behavior: Behavior normal.         Diagnostic Study Results     Labs -     Recent Results (from the past 12 hour(s))   CBC WITH AUTOMATED DIFF    Collection Time: 10/22/20  1:00 PM   Result Value Ref Range    WBC 12.0 (H) 4.1 - 11.1 K/uL    RBC 4.44 4.10 - 5.70 M/uL    HGB 13.7 12.1 - 17.0 g/dL    HCT 40.3 36.6 - 50.3 %    MCV 90.8 80.0 - 99.0 FL    MCH 30.9 26.0 - 34.0 PG    MCHC 34.0 30.0 - 36.5 g/dL    RDW 12.8 11.5 - 14.5 %    PLATELET 881 (H) 824 - 400 K/uL    MPV 11.6 8.9 - 12.9 FL    NRBC 0.0 0  WBC    ABSOLUTE NRBC 0.00 0.00 - 0.01 K/uL    NEUTROPHILS 85 (H) 32 - 75 %    LYMPHOCYTES 11 (L) 12 - 49 %    MONOCYTES 3 (L) 5 - 13 %    EOSINOPHILS 0 0 - 7 %    BASOPHILS 0 0 - 1 %    IMMATURE GRANULOCYTES 1 (H) 0.0 - 0.5 %    ABS. NEUTROPHILS 10.1 (H) 1.8 - 8.0 K/UL    ABS. LYMPHOCYTES 1.3 0.8 - 3.5 K/UL    ABS. MONOCYTES 0.4 0.0 - 1.0 K/UL    ABS. EOSINOPHILS 0.0 0.0 - 0.4 K/UL    ABS. BASOPHILS 0.0 0.0 - 0.1 K/UL    ABS. IMM. GRANS. 0.1 (H) 0.00 - 0.04 K/UL    DF AUTOMATED     SAMPLES BEING HELD    Collection Time: 10/22/20  1:00 PM   Result Value Ref Range    SAMPLES BEING HELD MELISSA,RE     COMMENT        Add-on orders for these samples will be processed based on acceptable specimen integrity and analyte stability, which may vary by analyte. LIPASE    Collection Time: 10/22/20  1:00 PM   Result Value Ref Range    Lipase 80 73 - 386 U/L   METABOLIC PANEL, COMPREHENSIVE    Collection Time: 10/22/20  1:00 PM   Result Value Ref Range    Sodium 133 (L) 136 - 145 mmol/L    Potassium 4.3 3.5 - 5.1 mmol/L    Chloride 89 (L) 97 - 108 mmol/L    CO2 12 (LL) 21 - 32 mmol/L    Anion gap 32 (H) 5 - 15 mmol/L    Glucose 658 (HH) 65 - 100 mg/dL    BUN 43 (H) 6 - 20 MG/DL    Creatinine 2.14 (H) 0.70 - 1.30 MG/DL    BUN/Creatinine ratio 20 12 - 20      GFR est AA 42 (L) >60 ml/min/1.73m2    GFR est non-AA 35 (L) >60 ml/min/1.73m2    Calcium 9.8 8.5 - 10.1 MG/DL    Bilirubin, total 0.4 0.2 - 1.0 MG/DL    ALT (SGPT) 33 12 - 78 U/L    AST (SGOT) 19 15 - 37 U/L    Alk.  phosphatase 131 (H) 45 - 117 U/L    Protein, total 7.3 6.4 - 8.2 g/dL    Albumin 3.1 (L) 3.5 - 5.0 g/dL    Globulin 4.2 (H) 2.0 - 4.0 g/dL    A-G Ratio 0.7 (L) 1.1 - 2.2     EKG, 12 LEAD, INITIAL    Collection Time: 10/22/20  1:23 PM   Result Value Ref Range    Ventricular Rate 113 BPM    Atrial Rate 113 BPM P-R Interval 138 ms    QRS Duration 94 ms    Q-T Interval 356 ms    QTC Calculation (Bezet) 488 ms    Calculated P Axis 82 degrees    Calculated R Axis 90 degrees    Calculated T Axis 78 degrees    Diagnosis       Sinus tachycardia  Biatrial enlargement  Rightward axis  Pulmonary disease pattern  Incomplete right bundle branch block  When compared with ECG of 07-MAR-2020 14:11,  Incomplete right bundle branch block has replaced Nonspecific   intraventricular block     URINALYSIS W/ RFLX MICROSCOPIC    Collection Time: 10/22/20  2:23 PM   Result Value Ref Range    Color YELLOW/STRAW      Appearance CLEAR CLEAR      Specific gravity 1.024 1.003 - 1.030      pH (UA) 5.5 5.0 - 8.0      Protein 100 (A) NEG mg/dL    Glucose >1,000 (A) NEG mg/dL    Ketone >80 (A) NEG mg/dL    Blood SMALL (A) NEG      Urobilinogen 0.2 0.2 - 1.0 EU/dL    Nitrites Negative NEG      Leukocyte Esterase Negative NEG     BILIRUBIN, CONFIRM    Collection Time: 10/22/20  2:23 PM   Result Value Ref Range    Bilirubin UA, confirm Negative NEG     URINE MICROSCOPIC ONLY    Collection Time: 10/22/20  2:23 PM   Result Value Ref Range    WBC 0-4 0 - 4 /hpf    RBC 0-5 0 - 5 /hpf    Epithelial cells FEW FEW /lpf    Bacteria Negative NEG /hpf    Mucus 1+ (A) NEG /lpf       Radiologic Studies -   XR CHEST PORT    (Results Pending)     CT Results  (Last 48 hours)    None        CXR Results  (Last 48 hours)    None          Medical Decision Making   I am the first provider for this patient. I reviewed the vital signs, available nursing notes, past medical history, past surgical history, family history and social history. Vital Signs-Reviewed the patient's vital signs.   Patient Vitals for the past 12 hrs:   Temp Pulse Resp BP SpO2   10/22/20 1146 98 °F (36.7 °C) (!) 105 18 113/67 100 %       Records Reviewed: Nursing Notes, Old Medical Records, Previous Radiology Studies and Previous Laboratory Studies    Provider Notes (Medical Decision Making): 53-year-old male with history of type 1 diabetes and DKA presents emergency department with a chief complaint of vomiting. Vitals are stable. Abdominal exam is benign. DDx includes gastroparesis, gastroenteritis, DKA, gastritis. Abdominal exam is benign at this point, do not believe any need to pursue cross-sectional imaging. Check chest x-ray and EKG. Check basic labs including magnesium and urine. Check troponin. Give bolus fluids. ED Course:   Initial assessment performed. The patients presenting problems have been discussed, and they are in agreement with the care plan formulated and outlined with them. I have encouraged them to ask questions as they arise throughout their visit. ED Course as of Oct 22 1542   Thu Oct 22, 2020   1327 Preliminary EKG interpreted by me. Shows sinus tachycardia with a HR of 113. Peaked T waves in V4. No STEMI.    [MB]   1500 Mild leukocytosis on CBC likely reactive. Patient CMP shows an anion gap acidosis with hyperglycemia likely secondary to DKA. Patient received a liter of fluids. Potassium is 4.3. We will give Reglan in addition to Zofran for nausea. We will start half-normal saline with potassium and insulin drip. Patient updated. [MB]      ED Course User Index  [MB] Micheal Bustos MD     Patient discussed with hospitalist for admission. Critical Care Time:   CRITICAL CARE NOTE :    12:30 PM    IMPENDING DETERIORATION -Metabolic and Renal  ASSOCIATED RISK FACTORS - Metabolic changes and Dehydration  MANAGEMENT- Bedside Assessment  INTERPRETATION -  Xrays, ECG and labs  INTERVENTIONS - Metobolic interventions and insulin gtt  CASE REVIEW - Hospitalist and Nursing  TREATMENT RESPONSE -Stable  PERFORMED BY - Self    NOTES   :  I have spent 34 minutes of critical care time involved in lab review, consultations with specialist, family decision- making, bedside attention and documentation.  This time excludes time spent in any separate billed procedures. During this entire length of time I was immediately available to the patient . Valerio Conde MD      Disposition:      Admitted    DISCHARGE PLAN:  1. Current Discharge Medication List        2. Follow-up Information    None       3. Return to ED if worse     Diagnosis     Clinical Impression:   1. Intractable vomiting with nausea, unspecified vomiting type    2. Acute kidney injury ([address]    3. Diabetic ketoacidosis without coma associated with type 1 diabetes mellitus ([address]        Attestations:    Valerio Conde MD    Please note that this dictation was completed with PEER, the computer voice recognition software. Quite often unanticipated grammatical, syntax, homophones, and other interpretive errors are inadvertently transcribed by the computer software. Please disregard these errors. Please excuse any errors that have escaped final proofreading. Thank you.

## 2020-10-23 ENCOUNTER — APPOINTMENT (OUTPATIENT)
Dept: NON INVASIVE DIAGNOSTICS | Age: 40
DRG: 420 | End: 2020-10-23
Attending: FAMILY MEDICINE
Payer: MEDICAID

## 2020-10-23 LAB
ADMINISTERED INITIALS, ADMINIT: NORMAL
ANION GAP BLD CALC-SCNC: 30 MMOL/L (ref 10–20)
ANION GAP SERPL CALC-SCNC: 11 MMOL/L (ref 5–15)
ANION GAP SERPL CALC-SCNC: 11 MMOL/L (ref 5–15)
ANION GAP SERPL CALC-SCNC: 13 MMOL/L (ref 5–15)
BASOPHILS # BLD: 0 K/UL (ref 0–0.1)
BASOPHILS NFR BLD: 0 % (ref 0–1)
BUN BLD-MCNC: 43 MG/DL (ref 9–20)
BUN SERPL-MCNC: 52 MG/DL (ref 6–20)
BUN SERPL-MCNC: 53 MG/DL (ref 6–20)
BUN SERPL-MCNC: 53 MG/DL (ref 6–20)
BUN/CREAT SERPL: 23 (ref 12–20)
BUN/CREAT SERPL: 26 (ref 12–20)
BUN/CREAT SERPL: 27 (ref 12–20)
CA-I BLD-MCNC: 1.12 MMOL/L (ref 1.12–1.32)
CALCIUM SERPL-MCNC: 8.2 MG/DL (ref 8.5–10.1)
CALCIUM SERPL-MCNC: 8.5 MG/DL (ref 8.5–10.1)
CALCIUM SERPL-MCNC: 8.9 MG/DL (ref 8.5–10.1)
CHLORIDE BLD-SCNC: 94 MMOL/L (ref 98–107)
CHLORIDE SERPL-SCNC: 106 MMOL/L (ref 97–108)
CHLORIDE SERPL-SCNC: 107 MMOL/L (ref 97–108)
CHLORIDE SERPL-SCNC: 108 MMOL/L (ref 97–108)
CO2 BLD-SCNC: 13 MMOL/L (ref 21–32)
CO2 SERPL-SCNC: 24 MMOL/L (ref 21–32)
CO2 SERPL-SCNC: 25 MMOL/L (ref 21–32)
CO2 SERPL-SCNC: 25 MMOL/L (ref 21–32)
COMMENT, HOLDF: NORMAL
CREAT BLD-MCNC: 1.7 MG/DL (ref 0.6–1.3)
CREAT SERPL-MCNC: 1.97 MG/DL (ref 0.7–1.3)
CREAT SERPL-MCNC: 2.01 MG/DL (ref 0.7–1.3)
CREAT SERPL-MCNC: 2.35 MG/DL (ref 0.7–1.3)
D50 ADMINISTERED, D50ADM: 0 ML
D50 ADMINISTERED, D50ADM: 9 ML
D50 ORDER, D50ORD: 0 ML
D50 ORDER, D50ORD: 9 ML
DIFFERENTIAL METHOD BLD: ABNORMAL
ECHO AV AREA PEAK VELOCITY: 4.32 CM2
ECHO AV AREA/BSA PEAK VELOCITY: 2.4 CM2/M2
ECHO AV PEAK GRADIENT: 8.61 MMHG
ECHO AV PEAK VELOCITY: 146.68 CM/S
ECHO LA AREA 4C: 16.42 CM2
ECHO LA TO AORTIC ROOT RATIO: 1.02
ECHO LA VOL 4C: 46.01 ML (ref 18–58)
ECHO LA VOLUME INDEX A4C: 25.53 ML/M2 (ref 16–28)
ECHO LV E' LATERAL VELOCITY: 7.87 CM/S
ECHO LV E' SEPTAL VELOCITY: 7.12 CM/S
ECHO LV INTERNAL DIMENSION DIASTOLIC: 4.03 CM (ref 4.2–5.9)
ECHO LV INTERNAL DIMENSION SYSTOLIC: 2.81 CM
ECHO LV IVSD: 1.13 CM (ref 0.6–1)
ECHO LV MASS 2D: 134.1 G (ref 88–224)
ECHO LV MASS INDEX 2D: 74.4 G/M2 (ref 49–115)
ECHO LV POSTERIOR WALL DIASTOLIC: 0.93 CM (ref 0.6–1)
ECHO LVOT DIAM: 2.33 CM
ECHO LVOT PEAK GRADIENT: 8.88 MMHG
ECHO LVOT PEAK VELOCITY: 148.97 CM/S
ECHO MV A VELOCITY: 80.71 CM/S
ECHO MV E DECELERATION TIME (DT): 140.52 MS
ECHO MV E VELOCITY: 69.76 CM/S
ECHO MV E/A RATIO: 0.86
ECHO MV E/E' LATERAL: 8.86
ECHO MV E/E' RATIO (AVERAGED): 9.33
ECHO MV E/E' SEPTAL: 9.8
ECHO PV PEAK INSTANTANEOUS GRADIENT SYSTOLIC: 5.55 MMHG
ECHO PV REGURGITANT MAX VELOCITY: 117.76 CM/S
ECHO RV INTERNAL DIMENSION: 2.41 CM
ECHO TV REGURGITANT MAX VELOCITY: 183.65 CM/S
EOSINOPHIL # BLD: 0 K/UL (ref 0–0.4)
EOSINOPHIL NFR BLD: 0 % (ref 0–7)
ERYTHROCYTE [DISTWIDTH] IN BLOOD BY AUTOMATED COUNT: 12.6 % (ref 11.5–14.5)
ERYTHROCYTE [DISTWIDTH] IN BLOOD BY AUTOMATED COUNT: 12.7 % (ref 11.5–14.5)
GLSCOM COMMENTS: NORMAL
GLUCOSE BLD STRIP.AUTO-MCNC: 101 MG/DL (ref 65–100)
GLUCOSE BLD STRIP.AUTO-MCNC: 129 MG/DL (ref 65–100)
GLUCOSE BLD STRIP.AUTO-MCNC: 132 MG/DL (ref 65–100)
GLUCOSE BLD STRIP.AUTO-MCNC: 141 MG/DL (ref 65–100)
GLUCOSE BLD STRIP.AUTO-MCNC: 156 MG/DL (ref 65–100)
GLUCOSE BLD STRIP.AUTO-MCNC: 179 MG/DL (ref 65–100)
GLUCOSE BLD STRIP.AUTO-MCNC: 182 MG/DL (ref 65–100)
GLUCOSE BLD STRIP.AUTO-MCNC: 186 MG/DL (ref 65–100)
GLUCOSE BLD STRIP.AUTO-MCNC: 206 MG/DL (ref 65–100)
GLUCOSE BLD STRIP.AUTO-MCNC: 211 MG/DL (ref 65–100)
GLUCOSE BLD STRIP.AUTO-MCNC: 212 MG/DL (ref 65–100)
GLUCOSE BLD STRIP.AUTO-MCNC: 592 MG/DL (ref 65–100)
GLUCOSE BLD STRIP.AUTO-MCNC: 77 MG/DL (ref 65–100)
GLUCOSE BLD STRIP.AUTO-MCNC: 89 MG/DL (ref 65–100)
GLUCOSE BLD STRIP.AUTO-MCNC: 96 MG/DL (ref 65–100)
GLUCOSE BLD STRIP.AUTO-MCNC: 98 MG/DL (ref 65–100)
GLUCOSE BLD-MCNC: 649 MG/DL (ref 65–100)
GLUCOSE SERPL-MCNC: 112 MG/DL (ref 65–100)
GLUCOSE SERPL-MCNC: 153 MG/DL (ref 65–100)
GLUCOSE SERPL-MCNC: 196 MG/DL (ref 65–100)
GLUCOSE, GLC: 101 MG/DL
GLUCOSE, GLC: 129 MG/DL
GLUCOSE, GLC: 141 MG/DL
GLUCOSE, GLC: 156 MG/DL
GLUCOSE, GLC: 179 MG/DL
GLUCOSE, GLC: 182 MG/DL
GLUCOSE, GLC: 186 MG/DL
GLUCOSE, GLC: 206 MG/DL
GLUCOSE, GLC: 211 MG/DL
GLUCOSE, GLC: 212 MG/DL
GLUCOSE, GLC: 77 MG/DL
GLUCOSE, GLC: 89 MG/DL
GLUCOSE, GLC: 96 MG/DL
HCT VFR BLD AUTO: 34.5 % (ref 36.6–50.3)
HCT VFR BLD AUTO: 35 % (ref 36.6–50.3)
HCT VFR BLD CALC: 41 % (ref 36.6–50.3)
HGB BLD-MCNC: 12.2 G/DL (ref 12.1–17)
HGB BLD-MCNC: 12.4 G/DL (ref 12.1–17)
HIGH TARGET, HITG: 250 MG/DL
IMM GRANULOCYTES # BLD AUTO: 0.2 K/UL (ref 0–0.04)
IMM GRANULOCYTES NFR BLD AUTO: 1 % (ref 0–0.5)
INR PPP: 1.1 (ref 0.9–1.1)
INSULIN ADMINSTERED, INSADM: 0 UNITS/HOUR
INSULIN ADMINSTERED, INSADM: 0.8 UNITS/HOUR
INSULIN ADMINSTERED, INSADM: 0.8 UNITS/HOUR
INSULIN ADMINSTERED, INSADM: 1.1 UNITS/HOUR
INSULIN ADMINSTERED, INSADM: 1.2 UNITS/HOUR
INSULIN ADMINSTERED, INSADM: 1.3 UNITS/HOUR
INSULIN ADMINSTERED, INSADM: 1.3 UNITS/HOUR
INSULIN ADMINSTERED, INSADM: 1.4 UNITS/HOUR
INSULIN ADMINSTERED, INSADM: 1.6 UNITS/HOUR
INSULIN ADMINSTERED, INSADM: 1.7 UNITS/HOUR
INSULIN ADMINSTERED, INSADM: 12.2 UNITS/HOUR
INSULIN ADMINSTERED, INSADM: 2.1 UNITS/HOUR
INSULIN ADMINSTERED, INSADM: 5.2 UNITS/HOUR
INSULIN ORDER, INSORD: 0 UNITS/HOUR
INSULIN ORDER, INSORD: 0.8 UNITS/HOUR
INSULIN ORDER, INSORD: 0.8 UNITS/HOUR
INSULIN ORDER, INSORD: 1.1 UNITS/HOUR
INSULIN ORDER, INSORD: 1.2 UNITS/HOUR
INSULIN ORDER, INSORD: 1.3 UNITS/HOUR
INSULIN ORDER, INSORD: 1.3 UNITS/HOUR
INSULIN ORDER, INSORD: 1.4 UNITS/HOUR
INSULIN ORDER, INSORD: 1.6 UNITS/HOUR
INSULIN ORDER, INSORD: 1.7 UNITS/HOUR
INSULIN ORDER, INSORD: 12.2 UNITS/HOUR
INSULIN ORDER, INSORD: 2.1 UNITS/HOUR
INSULIN ORDER, INSORD: 5.2 UNITS/HOUR
LACTATE SERPL-SCNC: 1.2 MMOL/L (ref 0.4–2)
LACTATE SERPL-SCNC: 2.7 MMOL/L (ref 0.4–2)
LOW TARGET, LOT: 150 MG/DL
LYMPHOCYTES # BLD: 3.1 K/UL (ref 0.8–3.5)
LYMPHOCYTES NFR BLD: 16 % (ref 12–49)
MAGNESIUM SERPL-MCNC: 2 MG/DL (ref 1.6–2.4)
MAGNESIUM SERPL-MCNC: 2.2 MG/DL (ref 1.6–2.4)
MAGNESIUM SERPL-MCNC: 2.4 MG/DL (ref 1.6–2.4)
MCH RBC QN AUTO: 30.6 PG (ref 26–34)
MCH RBC QN AUTO: 32.3 PG (ref 26–34)
MCHC RBC AUTO-ENTMCNC: 34.9 G/DL (ref 30–36.5)
MCHC RBC AUTO-ENTMCNC: 35.9 G/DL (ref 30–36.5)
MCV RBC AUTO: 87.7 FL (ref 80–99)
MCV RBC AUTO: 89.8 FL (ref 80–99)
MINUTES UNTIL NEXT BG, NBG: 120 MIN
MINUTES UNTIL NEXT BG, NBG: 15 MIN
MINUTES UNTIL NEXT BG, NBG: 60 MIN
MONOCYTES # BLD: 2.5 K/UL (ref 0–1)
MONOCYTES NFR BLD: 13 % (ref 5–13)
MULTIPLIER, MUL: 0.01
MULTIPLIER, MUL: 0.02
MULTIPLIER, MUL: 0.03
MULTIPLIER, MUL: 0.04
MULTIPLIER, MUL: 0.05
MULTIPLIER, MUL: 0.06
MULTIPLIER, MUL: 0.08
NEUTS SEG # BLD: 13.4 K/UL (ref 1.8–8)
NEUTS SEG NFR BLD: 70 % (ref 32–75)
NRBC # BLD: 0 K/UL (ref 0–0.01)
NRBC # BLD: 0 K/UL (ref 0–0.01)
NRBC BLD-RTO: 0 PER 100 WBC
NRBC BLD-RTO: 0 PER 100 WBC
ORDER INITIALS, ORDINIT: NORMAL
PLATELET # BLD AUTO: 452 K/UL (ref 150–400)
PLATELET # BLD AUTO: 496 K/UL (ref 150–400)
PMV BLD AUTO: 10.6 FL (ref 8.9–12.9)
PMV BLD AUTO: 10.8 FL (ref 8.9–12.9)
POTASSIUM BLD-SCNC: 4.5 MMOL/L (ref 3.5–5.1)
POTASSIUM SERPL-SCNC: 3.5 MMOL/L (ref 3.5–5.1)
POTASSIUM SERPL-SCNC: 3.6 MMOL/L (ref 3.5–5.1)
POTASSIUM SERPL-SCNC: 3.9 MMOL/L (ref 3.5–5.1)
PROTHROMBIN TIME: 11.6 SEC (ref 9–11.1)
RBC # BLD AUTO: 3.84 M/UL (ref 4.1–5.7)
RBC # BLD AUTO: 3.99 M/UL (ref 4.1–5.7)
RBC MORPH BLD: ABNORMAL
SAMPLES BEING HELD,HOLD: NORMAL
SERVICE CMNT-IMP: ABNORMAL
SERVICE CMNT-IMP: NORMAL
SODIUM BLD-SCNC: 132 MMOL/L (ref 136–145)
SODIUM SERPL-SCNC: 142 MMOL/L (ref 136–145)
SODIUM SERPL-SCNC: 144 MMOL/L (ref 136–145)
SODIUM SERPL-SCNC: 144 MMOL/L (ref 136–145)
TROPONIN I SERPL-MCNC: <0.05 NG/ML
TROPONIN I SERPL-MCNC: <0.05 NG/ML
WBC # BLD AUTO: 19.2 K/UL (ref 4.1–11.1)
WBC # BLD AUTO: 21 K/UL (ref 4.1–11.1)

## 2020-10-23 PROCEDURE — 65660000000 HC RM CCU STEPDOWN

## 2020-10-23 PROCEDURE — 74011000250 HC RX REV CODE- 250: Performed by: EMERGENCY MEDICINE

## 2020-10-23 PROCEDURE — 74011000258 HC RX REV CODE- 258: Performed by: NURSE PRACTITIONER

## 2020-10-23 PROCEDURE — 85610 PROTHROMBIN TIME: CPT

## 2020-10-23 PROCEDURE — 83605 ASSAY OF LACTIC ACID: CPT

## 2020-10-23 PROCEDURE — 82962 GLUCOSE BLOOD TEST: CPT

## 2020-10-23 PROCEDURE — 74011000250 HC RX REV CODE- 250: Performed by: INTERNAL MEDICINE

## 2020-10-23 PROCEDURE — 93306 TTE W/DOPPLER COMPLETE: CPT

## 2020-10-23 PROCEDURE — 93306 TTE W/DOPPLER COMPLETE: CPT | Performed by: INTERNAL MEDICINE

## 2020-10-23 PROCEDURE — 83735 ASSAY OF MAGNESIUM: CPT

## 2020-10-23 PROCEDURE — 74011250636 HC RX REV CODE- 250/636: Performed by: INTERNAL MEDICINE

## 2020-10-23 PROCEDURE — 85027 COMPLETE CBC AUTOMATED: CPT

## 2020-10-23 PROCEDURE — 74011000250 HC RX REV CODE- 250: Performed by: FAMILY MEDICINE

## 2020-10-23 PROCEDURE — 51798 US URINE CAPACITY MEASURE: CPT

## 2020-10-23 PROCEDURE — 84484 ASSAY OF TROPONIN QUANT: CPT

## 2020-10-23 PROCEDURE — 74011250636 HC RX REV CODE- 250/636: Performed by: NURSE PRACTITIONER

## 2020-10-23 PROCEDURE — 74011636637 HC RX REV CODE- 636/637: Performed by: INTERNAL MEDICINE

## 2020-10-23 PROCEDURE — 74011250637 HC RX REV CODE- 250/637: Performed by: INTERNAL MEDICINE

## 2020-10-23 PROCEDURE — 74011250637 HC RX REV CODE- 250/637: Performed by: NURSE PRACTITIONER

## 2020-10-23 PROCEDURE — 74011250636 HC RX REV CODE- 250/636: Performed by: EMERGENCY MEDICINE

## 2020-10-23 PROCEDURE — 80048 BASIC METABOLIC PNL TOTAL CA: CPT

## 2020-10-23 PROCEDURE — 74011250636 HC RX REV CODE- 250/636: Performed by: FAMILY MEDICINE

## 2020-10-23 PROCEDURE — 36415 COLL VENOUS BLD VENIPUNCTURE: CPT

## 2020-10-23 PROCEDURE — 77010033678 HC OXYGEN DAILY

## 2020-10-23 RX ORDER — POTASSIUM CHLORIDE 7.45 MG/ML
10 INJECTION INTRAVENOUS
Status: COMPLETED | OUTPATIENT
Start: 2020-10-23 | End: 2020-10-23

## 2020-10-23 RX ORDER — METOPROLOL TARTRATE 25 MG/1
25 TABLET, FILM COATED ORAL 2 TIMES DAILY
Status: DISCONTINUED | OUTPATIENT
Start: 2020-10-23 | End: 2020-10-24 | Stop reason: HOSPADM

## 2020-10-23 RX ORDER — TAMSULOSIN HYDROCHLORIDE 0.4 MG/1
0.4 CAPSULE ORAL DAILY
Status: DISCONTINUED | OUTPATIENT
Start: 2020-10-24 | End: 2020-10-23

## 2020-10-23 RX ORDER — HEPARIN SODIUM 5000 [USP'U]/ML
5000 INJECTION, SOLUTION INTRAVENOUS; SUBCUTANEOUS EVERY 8 HOURS
Status: DISCONTINUED | OUTPATIENT
Start: 2020-10-23 | End: 2020-10-24 | Stop reason: HOSPADM

## 2020-10-23 RX ORDER — DEXTROSE MONOHYDRATE AND SODIUM CHLORIDE 5; .45 G/100ML; G/100ML
150 INJECTION, SOLUTION INTRAVENOUS CONTINUOUS
Status: DISPENSED | OUTPATIENT
Start: 2020-10-23 | End: 2020-10-23

## 2020-10-23 RX ORDER — INSULIN LISPRO 100 [IU]/ML
5 INJECTION, SOLUTION INTRAVENOUS; SUBCUTANEOUS
Status: DISCONTINUED | OUTPATIENT
Start: 2020-10-23 | End: 2020-10-24 | Stop reason: HOSPADM

## 2020-10-23 RX ORDER — TAMSULOSIN HYDROCHLORIDE 0.4 MG/1
0.4 CAPSULE ORAL
Status: DISCONTINUED | OUTPATIENT
Start: 2020-10-23 | End: 2020-10-24 | Stop reason: HOSPADM

## 2020-10-23 RX ORDER — INSULIN GLARGINE 100 [IU]/ML
30 INJECTION, SOLUTION SUBCUTANEOUS DAILY
Status: DISCONTINUED | OUTPATIENT
Start: 2020-10-23 | End: 2020-10-24 | Stop reason: HOSPADM

## 2020-10-23 RX ORDER — DEXTROSE MONOHYDRATE AND SODIUM CHLORIDE 5; .45 G/100ML; G/100ML
150 INJECTION, SOLUTION INTRAVENOUS CONTINUOUS
Status: DISCONTINUED | OUTPATIENT
Start: 2020-10-23 | End: 2020-10-23

## 2020-10-23 RX ORDER — GEMFIBROZIL 600 MG/1
600 TABLET, FILM COATED ORAL DAILY
Status: DISCONTINUED | OUTPATIENT
Start: 2020-10-24 | End: 2020-10-24 | Stop reason: HOSPADM

## 2020-10-23 RX ADMIN — METOCLOPRAMIDE 5 MG: 5 INJECTION, SOLUTION INTRAMUSCULAR; INTRAVENOUS at 11:40

## 2020-10-23 RX ADMIN — HEPARIN SODIUM 5000 UNITS: 5000 INJECTION INTRAVENOUS; SUBCUTANEOUS at 20:30

## 2020-10-23 RX ADMIN — DEXTROSE MONOHYDRATE 4.5 G: 25 INJECTION, SOLUTION INTRAVENOUS at 05:38

## 2020-10-23 RX ADMIN — METOPROLOL TARTRATE 25 MG: 25 TABLET, FILM COATED ORAL at 17:51

## 2020-10-23 RX ADMIN — FAMOTIDINE 20 MG: 10 INJECTION INTRAVENOUS at 08:03

## 2020-10-23 RX ADMIN — MORPHINE SULFATE 1 MG: 2 INJECTION, SOLUTION INTRAMUSCULAR; INTRAVENOUS at 06:07

## 2020-10-23 RX ADMIN — POTASSIUM CHLORIDE 10 MEQ: 7.46 INJECTION, SOLUTION INTRAVENOUS at 00:50

## 2020-10-23 RX ADMIN — TAMSULOSIN HYDROCHLORIDE 0.4 MG: 0.4 CAPSULE ORAL at 22:46

## 2020-10-23 RX ADMIN — MORPHINE SULFATE 1 MG: 2 INJECTION, SOLUTION INTRAMUSCULAR; INTRAVENOUS at 20:40

## 2020-10-23 RX ADMIN — PROCHLORPERAZINE EDISYLATE 10 MG: 5 INJECTION INTRAMUSCULAR; INTRAVENOUS at 21:32

## 2020-10-23 RX ADMIN — DEXTROSE MONOHYDRATE AND SODIUM CHLORIDE 150 ML/HR: 5; .45 INJECTION, SOLUTION INTRAVENOUS at 11:43

## 2020-10-23 RX ADMIN — Medication 10 ML: at 17:53

## 2020-10-23 RX ADMIN — METOCLOPRAMIDE 5 MG: 5 INJECTION, SOLUTION INTRAMUSCULAR; INTRAVENOUS at 06:08

## 2020-10-23 RX ADMIN — Medication 10 ML: at 06:08

## 2020-10-23 RX ADMIN — POTASSIUM CHLORIDE 10 MEQ: 7.46 INJECTION, SOLUTION INTRAVENOUS at 02:03

## 2020-10-23 RX ADMIN — INSULIN LISPRO 5 UNITS: 100 INJECTION, SOLUTION INTRAVENOUS; SUBCUTANEOUS at 12:01

## 2020-10-23 RX ADMIN — ONDANSETRON 4 MG: 2 INJECTION INTRAMUSCULAR; INTRAVENOUS at 17:51

## 2020-10-23 RX ADMIN — MORPHINE SULFATE 1 MG: 2 INJECTION, SOLUTION INTRAMUSCULAR; INTRAVENOUS at 00:50

## 2020-10-23 RX ADMIN — Medication 10 ML: at 22:46

## 2020-10-23 RX ADMIN — ONDANSETRON 4 MG: 2 INJECTION INTRAMUSCULAR; INTRAVENOUS at 04:21

## 2020-10-23 RX ADMIN — POTASSIUM CHLORIDE 10 MEQ: 7.46 INJECTION, SOLUTION INTRAVENOUS at 04:23

## 2020-10-23 RX ADMIN — HEPARIN SODIUM 5000 UNITS: 5000 INJECTION INTRAVENOUS; SUBCUTANEOUS at 12:01

## 2020-10-23 RX ADMIN — INSULIN GLARGINE 30 UNITS: 100 INJECTION, SOLUTION SUBCUTANEOUS at 12:01

## 2020-10-23 RX ADMIN — POTASSIUM CHLORIDE 10 MEQ: 7.46 INJECTION, SOLUTION INTRAVENOUS at 03:27

## 2020-10-23 RX ADMIN — DEXTROSE MONOHYDRATE AND SODIUM CHLORIDE 150 ML/HR: 5; .45 INJECTION, SOLUTION INTRAVENOUS at 06:05

## 2020-10-23 NOTE — PROGRESS NOTES
0700H- Bedside and Verbal shift change report given to ALINA  (oncoming nurse) by Coni Freed (offgoing nurse). Report included the following information SBAR, Kardex, ED Summary, Procedure Summary, Intake/Output, MAR and Recent Results. 18H- Seen and examined by Dr. Shandra Thomas no new orders was made. 1900H- Bedside and Verbal shift change report given to ELICIA (oncoming nurse) by Hany Amador (offgoing nurse). Report included the following information SBAR, Kardex, ED Summary, Procedure Summary, Intake/Output, MAR and Recent Results.

## 2020-10-23 NOTE — PROGRESS NOTES
perfectserve message   FROM [address] 1980   RM [id number]   MESSAGE Pt came in this afternoon with DKA, Multiple critical results received. K 2.3, CO2 of 6, Ca 6.1, and lactic 3       NEEDS CALL BACK: Needs call back       Called nurse back: yes         Discussion / orders:  Reviewed notes and labs. Just 4 hours ago, patient has potassium of 5.5 and normal Ca. Ref. Range 10/22/2020 16:18 10/22/2020 18:59   Sodium Latest Ref Range: 136 - 145 mmol/L 132 (L) 145   Potassium Latest Ref Range: 3.5 - 5.1 mmol/L 5.5 (H) 2.3 (LL)   Chloride Latest Ref Range: 97 - 108 mmol/L 93 (L) 112 (H)   CO2 Latest Ref Range: 21 - 32 mmol/L 9 (LL) 6 (LL)   Anion gap Latest Ref Range: 5 - 15 mmol/L 30 (H) 27 (H)   Glucose Latest Ref Range: 65 - 100 mg/dL 712 (HH) 442 (H)   BUN Latest Ref Range: 6 - 20 MG/DL 46 (H) 39 (H)   Creatinine Latest Ref Range: 0.70 - 1.30 MG/DL 2.04 (H) 1.49 (H)   BUN/Creatinine ratio Latest Ref Range: 12 - 20   23 (H) 26 (H)   Calcium Latest Ref Range: 8.5 - 10.1 MG/DL 9.3 6.1 (LL)   Phosphorus Latest Ref Range: 2.6 - 4.7 MG/DL 7.7 (H)    Magnesium Latest Ref Range: 1.6 - 2.4 mg/dL 2.6 (H) 1.8   GFR est non-AA Latest Ref Range: >60 ml/min/1.73m2 37 (L) 53 (L)   GFR est AA Latest Ref Range: >60 ml/min/1.73m2 45 (L) >60     Has been receiving iv hydration. Has not received any potassium reducing meds. Insulin drip started at around 1700  Discussed with patient's nurse. Recheck BMP and CBC stat to confirm accuracy of results and to evaluate WBC to see if it has further elevated. Patient is afebrile at this time . Blood pressure is 121/72  Heart rate is 123. Will give a dose of metoprolol as well         NURSE  MESSAGE At 2216  Due to limited access, myself and multiple nurses attempted to stick patient for labs unsuccessfully. Is it possible for us to get him orthostatic for this patient? Thanks         I went to patient's room and severo blood. Nurse sending blood to lab. NURSE  MESSAGE At [phone]    Just updating you on labs. K was 3.0, CO2 is 14, calcium 8.2.   Thanks         Ordered 4 runs of KCl 10 mEq iv  Has BMP ordered every 4 hours until DKA has resolved

## 2020-10-23 NOTE — PROGRESS NOTES
[facility]  Home  Follow up appointments    Reason for Admission:   Nausea/vomiting, DKA on presentation                  RUR Score:     26             PCP: First and Last name:  Jessi Membreno   Name of Practice:   [facility]   Are you a current patient: Yes/No:   Yes   Approximate date of last visit:   Within last 6 months   Can you participate in a virtual visit if needed:     Do you (patient/family) have any concerns for transition/discharge? No needs identified. Patient has transportation available at time of discharge. Plan for utilizing home health:   n/a    Current Advanced Directive/Advance Care Plan:   Patient does not have an AMD on file. Patient has declined at this time. Care Management Interventions  PCP Verified by CM: Yes  Mode of Transport at Discharge: Other (see comment)(family/friend)  Discharge Durable Medical Equipment: No  Physical Therapy Consult: No  Occupational Therapy Consult: No  Speech Therapy Consult: No  Current Support Network: Relative's Home(lives with mother in mobile trailer - 2 steps entry)  Confirm Follow Up Transport: Self  Discharge Location  Discharge Placement: Home    CM will continue to follow for discharge planning.      Glori Mcburney, RN   Ext [phone]

## 2020-10-23 NOTE — PROGRESS NOTES
Hospitalist Progress Note    NAME: Dorita Boyer   :  1980   MRN:  [id number]     Dorita Boyer is a 42 y.o. male who presents with nausea and vomiting. Was noted to have diabetic ketoacidosis along with acute kidney injury upon arrival and was started on IV insulin per protocol. Assessment / Plan:  Diabetic ketoacidosis unclear precipitant  SIRS likely due to above  -Reports compliance with his medications  -Anion gap is closed and blood sugars are under good control  -We will resume his home insulin Lantus at 30 units and give a dose now on turn off insulin drip 2 hours later. We will also start his home insulin lispro 5 units 3 times daily. Start insulin sliding scale as well. Continue blood sugar checks.  -Pending hemoglobin A1c    Acute kidney injury likely prerenal related to dehydration  -Baseline creatinine is around 1.1 currently creatinine peaked at 2.3  -Urine analysis is normal  -Creatinine is trending down and is 1.97  -Check BMP in a.m. Chest pain likely related to nausea and vomiting  -Troponins x3 are normal    Hypertension   GERD  BPH  Dyslipidemia  -Continue his home metoprolol. Hold lisinopril due to acute kidney injury  -Continue home PPI, Flomax and Lopid      Body mass index is 21.41 kg/m². Code status: Full  Prophylaxis: Hep SQ  Recommended Disposition: Home w/Family     Subjective:     Chief Complaint / Reason for Physician Visit  He reports feeling better. Abdominal pain is resolved. Mild nausea but no vomiting. Wants to eat  Anion gap is closed      Review of Systems:  Symptom Y/N Comments  Symptom Y/N Comments   Fever/Chills    Chest Pain     Poor Appetite    Edema     Cough    Abdominal Pain     Sputum    Joint Pain     SOB/JOHNSTON    Pruritis/Rash     Nausea/vomit    Tolerating PT/OT     Diarrhea    Tolerating Diet     Constipation    Other       Could NOT obtain due to:      Objective:     VITALS:   Last 24hrs VS reviewed since prior progress note.  Most recent are:  Patient Vitals for the past 24 hrs:   Temp Pulse Resp BP SpO2   10/23/20 1036 97.6 °F (36.4 °C) (!) 108 18 138/76 100 %   10/23/20 0742 98.3 °F (36.8 °C) (!) 112 18 (!) 144/70 100 %   10/23/20 0331 98 °F (36.7 °C) (!) 109 21 122/69 100 %   10/23/20 0000 97.8 °F (36.6 °C) (!) 114 23 130/68 100 %   10/22/20 1947 98.1 °F (36.7 °C) (!) 123 26 121/72 100 %   10/22/20 1810 97.7 °F (36.5 °C) (!) 122 28 123/75 100 %   10/22/20 1715 -- (!) 120 18 126/86 100 %   10/22/20 1617 -- (!) 113 18 106/73 100 %       Intake/Output Summary (Last 24 hours) at 10/23/2020 1328  Last data filed at 10/23/2020 1039  Gross per 24 hour   Intake 0 ml   Output 850 ml   Net -850 ml        PHYSICAL EXAM:  General: cooperative, no acute distress    EENT:  EOMI. Anicteric sclerae. MMM  Resp:  CTA bilaterally, no wheezing or rales. No accessory muscle use  CV:  Regular  rhythm,  No edema  GI:  Soft, Non distended, Non tender.  +Bowel sounds  Neurologic:  Alert and oriented X 3, normal speech,   Psych:   Not anxious nor agitated  Skin:  No rashes.   No jaundice    Reviewed most current lab test results and cultures  YES  Reviewed most current radiology test results   YES  Review and summation of old records today    NO  Reviewed patient's current orders and MAR    YES  PMH/SH reviewed - no change compared to H&P          Current Facility-Administered Medications:     heparin (porcine) injection 5,000 Units, 5,000 Units, SubCUTAneous, Q8H, Hayden Alfred MD, 5,000 Units at 10/23/20 1201    insulin glargine (LANTUS) injection 30 Units, 30 Units, SubCUTAneous, DAILY, Omar Nixon MD, 30 Units at 10/23/20 1201    insulin lispro (HUMALOG) injection 5 Units, 5 Units, SubCUTAneous, TIDAC, Bertin Holt MD, 5 Units at 10/23/20 1201    insulin regular (NOVOLIN R, HUMULIN R) 100 Units in 0.9% sodium chloride 100 mL infusion, 0-50 Units/hr, IntraVENous, TITRATE, Omar Nixon MD, Last Rate: 1.3 mL/hr at 10/23/20 1304, 1.3 Units/hr at 10/23/20 1304    dextrose 5 % - 0.45% NaCl infusion, 150 mL/hr, IntraVENous, CONTINUOUS, Golla, Adonna Romberg, MD    metoclopramide HCl (REGLAN) injection 5 mg, 5 mg, IntraVENous, Q6H, Sherwin Mcdowell MD, 5 mg at 10/23/20 1140    sodium chloride (NS) flush 5-40 mL, 5-40 mL, IntraVENous, Q8H, Hayden Alfred MD, 10 mL at 10/23/20 0608    sodium chloride (NS) flush 5-40 mL, 5-40 mL, IntraVENous, PRN, Deisy Soto MD    acetaminophen (TYLENOL) tablet 650 mg, 650 mg, Oral, Q6H PRN **OR** acetaminophen (TYLENOL) suppository 650 mg, 650 mg, Rectal, Q6H PRN, Deisy Soto MD    famotidine (PF) (PEPCID) 20 mg in 0.9% sodium chloride 10 mL injection, 20 mg, IntraVENous, DAILY, Hayden Alfred MD, 20 mg at 10/23/20 0803    ondansetron [facility]) injection 4 mg, 4 mg, IntraVENous, Q4H PRN, Deisy Soto MD, 4 mg at 10/23/20 0421    morphine injection 1 mg, 1 mg, IntraVENous, Q4H PRN, Deisy Soto MD, 1 mg at 10/23/20 2922  ________________________________________________________________________  Care Plan discussed with:    Comments   Patient y    Family      RN y    Care Manager     Consultant                        Multidiciplinary team rounds were held today with , nursing, pharmacist and clinical coordinator. Patient's plan of care was discussed; medications were reviewed and discharge planning was addressed. ________________________________________________________________________  Total NON critical care TIME: 35   Minutes    Total CRITICAL CARE TIME Spent:   Minutes non procedure based      Comments   >50% of visit spent in counseling and coordination of care     ________________________________________________________________________  Ksenia Westfall MD     Procedures: see electronic medical records for all procedures/Xrays and details which were not copied into this note but were reviewed prior to creation of Plan.       LABS:  I reviewed today's most current labs and imaging studies. Pertinent labs include:  Recent Labs     10/23/20  0207 10/22/20  2056 10/22/20  1300   WBC 21.0* 19.2* 12.0*   HGB 12.2 12.4 13.7   HCT 35.0* 34.5* 40.3   * 452* 465*     Recent Labs     10/23/20  1015 10/23/20  0633 10/23/20  0207  10/22/20  1618 10/22/20  1300    144 144   < > 132* 133*   K 3.5 3.6 3.9   < > 5.5* 4.3    107 108   < > 93* 89*   CO2 25 24 25   < > 9* 12*   * 112* 153*   < > 712* 658*   BUN 53* 52* 53*   < > 46* 43*   CREA 1.97* 2.01* 2.35*   < > 2.04* 2.14*   CA 8.2* 8.5 8.9   < > 9.3 9.8   MG 2.0 2.2 2.4   < > 2.6* 2.5*   PHOS  --   --   --   --  7.7* 7.7*   ALB  --   --   --   --  2.9* 3.1*   TBILI  --   --   --   --  0.6 0.4   ALT  --   --   --   --  29 33   INR  --   --  1.1  --   --   --     < > = values in this interval not displayed.        Signed: Arlen Rodríguez MD

## 2020-10-23 NOTE — PROGRESS NOTES
1900- Bedside shift change report given to Brandi Diamond, RN (oncoming nurse) by Leta Joshi, ASAD (offgoing nurse). Report included the following information SBAR, Kardex, Procedure Summary, Intake/Output, MAR, Recent Results, Med Rec Status and Cardiac Rhythm Sinus tach. 2000- Critical results received from lab. K is 2.3, CO2 is 6, Ca 6.1, Lactic acid 3.0. Message sent via [employer] to NP Purveyor. 2030- NP Purveyor rounded on pt, ordered a repeat BMP.    2100- Single lab culture sent to lab during day shift, second paired bottle drawn. 2200- After multiple attempts by myself and other RN's, no labs were able to be drawn due to limited access. Message sent to NP Purveyor for an art stick. 2245- NP Purveyor able to draw labs, labs sent. 0000- Labs resulted, message sent to NP Purveyor updating on new results. K is 3.0, CO2 is 14, Ca is 8.    0330- Critical result received from lab, lactic acid 2.7. NP Purveyor notified via [employer]. 0700- Bedside shift change report given to Leta Joshi, RN (oncoming nurse) by Brandi Diamond, ASAD  (offgoing nurse). Report included the following information SBAR, Kardex, Procedure Summary, Intake/Output, MAR, Recent Results, Med Rec Status and Cardiac Rhythm Sinus tach.

## 2020-10-23 NOTE — PROGRESS NOTES
perfectserve message   FROM [facility]   RE Hanane Sayres 1980   RM 1803   MESSAGE Hi sugars are below 250 now can we had D5 to fluids. thanks         NEEDS CALL BACK: Needs call back         Called nurse back: N/A            Discussion / Orders:  Reviewed medication list.  Patient is currently on saline infusion at 150 mL/h.   Patient's blood glucose is now 96  Discontinued normal saline and placed order for D5 half-normal saline at 150 mL/h

## 2020-10-24 VITALS
WEIGHT: 145.06 LBS | OXYGEN SATURATION: 99 % | RESPIRATION RATE: 15 BRPM | HEART RATE: 93 BPM | BODY MASS INDEX: 21.49 KG/M2 | DIASTOLIC BLOOD PRESSURE: 82 MMHG | TEMPERATURE: 98 F | SYSTOLIC BLOOD PRESSURE: 142 MMHG | HEIGHT: 69 IN

## 2020-10-24 LAB
ANION GAP SERPL CALC-SCNC: 9 MMOL/L (ref 5–15)
BUN SERPL-MCNC: 34 MG/DL (ref 6–20)
BUN/CREAT SERPL: 29 (ref 12–20)
CALCIUM SERPL-MCNC: 8.4 MG/DL (ref 8.5–10.1)
CHLORIDE SERPL-SCNC: 103 MMOL/L (ref 97–108)
CO2 SERPL-SCNC: 28 MMOL/L (ref 21–32)
CREAT SERPL-MCNC: 1.19 MG/DL (ref 0.7–1.3)
ERYTHROCYTE [DISTWIDTH] IN BLOOD BY AUTOMATED COUNT: 12.9 % (ref 11.5–14.5)
GLUCOSE BLD STRIP.AUTO-MCNC: 173 MG/DL (ref 65–100)
GLUCOSE BLD STRIP.AUTO-MCNC: 200 MG/DL (ref 65–100)
GLUCOSE SERPL-MCNC: 117 MG/DL (ref 65–100)
HCT VFR BLD AUTO: 29.2 % (ref 36.6–50.3)
HGB BLD-MCNC: 10.4 G/DL (ref 12.1–17)
MCH RBC QN AUTO: 31.4 PG (ref 26–34)
MCHC RBC AUTO-ENTMCNC: 35.6 G/DL (ref 30–36.5)
MCV RBC AUTO: 88.2 FL (ref 80–99)
NRBC # BLD: 0 K/UL (ref 0–0.01)
NRBC BLD-RTO: 0 PER 100 WBC
PLATELET # BLD AUTO: 349 K/UL (ref 150–400)
PMV BLD AUTO: 10.9 FL (ref 8.9–12.9)
POTASSIUM SERPL-SCNC: 3.1 MMOL/L (ref 3.5–5.1)
RBC # BLD AUTO: 3.31 M/UL (ref 4.1–5.7)
SERVICE CMNT-IMP: ABNORMAL
SERVICE CMNT-IMP: ABNORMAL
SODIUM SERPL-SCNC: 140 MMOL/L (ref 136–145)
WBC # BLD AUTO: 10.4 K/UL (ref 4.1–11.1)

## 2020-10-24 PROCEDURE — 74011000250 HC RX REV CODE- 250: Performed by: FAMILY MEDICINE

## 2020-10-24 PROCEDURE — 74011250636 HC RX REV CODE- 250/636: Performed by: FAMILY MEDICINE

## 2020-10-24 PROCEDURE — 85027 COMPLETE CBC AUTOMATED: CPT

## 2020-10-24 PROCEDURE — 74011636637 HC RX REV CODE- 636/637: Performed by: INTERNAL MEDICINE

## 2020-10-24 PROCEDURE — 80048 BASIC METABOLIC PNL TOTAL CA: CPT

## 2020-10-24 PROCEDURE — 82962 GLUCOSE BLOOD TEST: CPT

## 2020-10-24 PROCEDURE — 74011250637 HC RX REV CODE- 250/637: Performed by: INTERNAL MEDICINE

## 2020-10-24 PROCEDURE — 51798 US URINE CAPACITY MEASURE: CPT

## 2020-10-24 PROCEDURE — 36415 COLL VENOUS BLD VENIPUNCTURE: CPT

## 2020-10-24 RX ORDER — POTASSIUM CHLORIDE 750 MG/1
40 TABLET, FILM COATED, EXTENDED RELEASE ORAL
Status: COMPLETED | OUTPATIENT
Start: 2020-10-24 | End: 2020-10-24

## 2020-10-24 RX ADMIN — GEMFIBROZIL 600 MG: 600 TABLET ORAL at 08:01

## 2020-10-24 RX ADMIN — INSULIN GLARGINE 30 UNITS: 100 INJECTION, SOLUTION SUBCUTANEOUS at 08:00

## 2020-10-24 RX ADMIN — METOPROLOL TARTRATE 25 MG: 25 TABLET, FILM COATED ORAL at 08:00

## 2020-10-24 RX ADMIN — INSULIN LISPRO 5 UNITS: 100 INJECTION, SOLUTION INTRAVENOUS; SUBCUTANEOUS at 08:00

## 2020-10-24 RX ADMIN — FAMOTIDINE 20 MG: 10 INJECTION INTRAVENOUS at 08:00

## 2020-10-24 RX ADMIN — HEPARIN SODIUM 5000 UNITS: 5000 INJECTION INTRAVENOUS; SUBCUTANEOUS at 11:35

## 2020-10-24 RX ADMIN — POTASSIUM CHLORIDE 40 MEQ: 750 TABLET, FILM COATED, EXTENDED RELEASE ORAL at 10:28

## 2020-10-24 RX ADMIN — INSULIN LISPRO 5 UNITS: 100 INJECTION, SOLUTION INTRAVENOUS; SUBCUTANEOUS at 11:35

## 2020-10-24 RX ADMIN — ONDANSETRON 4 MG: 2 INJECTION INTRAMUSCULAR; INTRAVENOUS at 04:57

## 2020-10-24 NOTE — PROGRESS NOTES
willie message   [address] 1980     2293   MESSAGE  patient complaining of nausea. As needed every 4 hours Zofran cannot be given again until 2200. No other as needed orders.   Thanks       NEEDS CALL BACK:  No callback requested       Called nurse back: N/A         Discussion / orders:  Entered order for one-time dose of Phenergan 25 mg IV

## 2020-10-24 NOTE — DISCHARGE INSTRUCTIONS
Patient Discharge Instructions    Dorita Boyer / [id number] : 1980    Admitted 10/22/2020 Discharged: 10/24/2020         DISCHARGE DIAGNOSIS:   Diabetic ketoacidosis unclear precipitant  SIRS likely due to above  Acute kidney injury likely prerenal related to dehydration  Chest pain likely related to nausea and vomiting  Hypertension   GERD  BPH  Dyslipidemia       Take Home Medications            General drug facts     If you have a very bad allergy, wear an allergy ID at all times. It is important that you take the medication exactly as they are prescribed. Keep your medication in the bottles provided by the pharmacist.  Keep a list of all your drugs (prescription, natural products, vitamins, OTC) with you. Give this list to your doctor. Do not take other medications without consulting your doctor. Do not share your drugs with others and do not take anyone else's drugs. Keep all drugs out of the reach of children and pets. Most drugs may be thrown away in household trash after mixing with coffee grounds or kyle litter and sealing in a plastic bag. Keep a list Call your doctor for help with any side effects. If in the [place] you may also call the FDA at 2-793-GHH-8099    Talk with the doctor before starting any new drug, including OTC, natural products, or vitamins. What to do at Home    1. Recommended diet: Diabetic     2. Recommended activity: Activity as tolerated    3. If you experience any of the following symptoms then please call your primary care physician or return to the emergency room if you cannot get hold of your doctor:    4. Wound Care: none    5. Lab work: Bmp in 1 week     6. Your home medication lisinopril is being held due to elevated creatinine. Discuss with your pcp about getting it  restarted in future    7. Bring these papers with you to your follow up appointments.  The papers will help your doctors be sure to continue the care plan from the [facility].      Follow-up with:   PCP: Salvador Jin MD  Follow-up Information     Follow up With Specialties Details Why Rosario Roberts MD Internal Medicine  PCP -hospital follow up  [address] [facility]  [address] [facility]      Salvador Jin MD Internal Medicine Schedule an appointment as soon as possible for a visit in 1 week  [address] [facility]  [address]  [phone]             Please call for your own appointment        Information obtained by :  I understand that if any problems occur once I am at home I am to contact my physician. I understand and acknowledge receipt of the instructions indicated above.                                                                                                                                            Physician's or R.N.'s Signature                                                                  Date/Time                                                                                                                                              Patient or Representative Signature                                                          Date/Time

## 2020-10-24 NOTE — PROGRESS NOTES
0700H- Bedside and Verbal shift change report given to ALINA (oncoming nurse) by Hazel Tariq (offgoing nurse). Report included the following information SBAR, Kardex, ED Summary, Procedure Summary, Intake/Output, MAR and Recent Results. 1000H- Seen and examined by Dr. Ania Felipe with orders noted. 1425H- Patient discharge instruction understood. Patient discharge via wheelchair. Hemodynamically stable.

## 2020-10-24 NOTE — PROGRESS NOTES
perfectserve message   [address] [place] [address] 1980     9233   MESSAGE  patient complains of not being able to void. Bladder scan showed more than 900. Patient refusing straight cath. Encourage patient to try to stand and void. Patient able to void 100 mL. Post void scan showed 750 mL still in bladder. Patient still refusing cath, but requesting that his home dose of Flomax be started tonight (it is already ordered, but currently order to start in the morning). Thank you         NEEDS CALL BACK:  No callback requested         Called nurse back: N/A            Discussion / orders:    Changed administration time of Flomax to start tonight.   And be given daily at bedtime

## 2020-10-26 ENCOUNTER — PATIENT OUTREACH (OUTPATIENT)
Dept: CASE MANAGEMENT | Age: 40
End: 2020-10-26

## 2020-10-26 NOTE — DISCHARGE SUMMARY
Hospitalist Discharge Summary     Patient ID:  Burgess Shearer  [id number]  47 y.o.  1980  10/22/2020    PCP on record: Ángel Nickerson MD    Admit date: 10/22/2020  Discharge date and time: 10/26/2020    DISCHARGE DIAGNOSIS:    Diabetic ketoacidosis unclear precipitant  SIRS likely due to above  Acute kidney injury likely prerenal related to dehydration  Chest pain likely related to nausea and vomiting  Hypertension   GERD    CONSULTATIONS:  None    Excerpted HPI from H&P of Alix Whiteside MD:  Burgess Shearer is a 42 y.o. male who presents with nausea and vomiting.      History was primarily obtained from the patient. Patient reports that he started experiencing nausea and vomiting that started yesterday noon. Patient reports since then he has had multiple episodes of nausea, vomiting, poor appetite, and 1-2 episodes of diarrhea. Patient came to the ER, was found to be in DKA and was requested to be admitted to the hospitalist service. Patient reports mild abdominal pain but no other complaints or problems. Patient reports that he has not had any fever or chills associated with his symptoms. Patient reports he has been taking his insulin as prescribed on a regular basis. Patient denies any other sources of infection.     The patient denies any Headache, blurry vision, sore throat, trouble swallowing, trouble with speech, chest pain, SOB, cough, fever, chills,, urinary symptoms, constipation, recent travels, sick contacts, focal or generalized neurological symptoms,  falls, injuries, rashes, contact with COVID-19 diagnosed patients, hematemesis, melena, hemoptysis, hematuria, rashes, denies starting any new medications and denies any other concerns or problems besides as mentioned above.     ______________________________________________________________________  DISCHARGE SUMMARY/HOSPITAL COURSE:  for full details see H&P, daily progress notes, labs, consult notes.      Diabetic ketoacidosis unclear precipitant  SIRS likely due to above  -admitted and started on dka protocol with iv insulin. Anion gap is closed and blood sugars are under good control  -We will resume his home insulin Lantus at 30 units and give a dose now on turn off insulin drip 2 hours later. We will also start his home insulin lispro 5 units 3 times daily. Start insulin sliding scale as well. Continue blood sugar checks. He was advised to continue his home insulin regimen as below       Acute kidney injury likely prerenal related to dehydration  -Baseline creatinine is around 1.1 currently creatinine peaked at 2.3  -Urine analysis is normal  -Creatinine is trending down and is 1.97 . Creatinine improved to baseline of around 1.1 on the day of discharge.       Chest pain likely related to nausea and vomiting  -Troponins x3 are normal     Hypertension   GERD  BPH  Dyslipidemia  -Continue his home metoprolol. Hold lisinopril due to acute kidney injury  -Continue home PPI, Flomax and Lopid     Patient seen and examined today, vital signs stable, lab work is at baseline      _______________________________________________________________________  Patient seen and examined by me on discharge day. Pertinent Findings:  Gen:    Not in distress  Chest: Clear lungs  CVS:   Regular rhythm.   No edema  Abd:  Soft, not distended, not tender  Neuro:  Alert, awake   _______________________________________________________________________  DISCHARGE MEDICATIONS:   Discharge Medication List as of 10/24/2020  2:20 PM      CONTINUE these medications which have NOT CHANGED    Details   Vitamin D2 1,250 mcg (50,000 unit) capsule Take 1 capsule by mouth once a week, Normal, Disp-12 Cap,R-0      metoprolol tartrate (LOPRESSOR) 25 mg tablet TAKE 1 TABLET BY MOUTH EVERY 12 HOURS, Normal, Disp-180 Tab,R-0      potassium chloride (K-DUR, KLOR-CON) 20 mEq tablet Take 1 tablet by mouth once daily, Normal, Disp-90 Tab,R-0      gemfibroziL (LOPID) 600 mg tablet Take 1 tablet by mouth twice daily, Normal, Disp-180 Tab,R-0      sildenafil citrate (VIAGRA) 50 mg tablet Take 1 Tab by mouth as needed (prn). , Normal, Disp-10 Tab,R-2      flash glucose sensor (FreeStyle Connor 14 Day Sensor) kit 6 (14 day) sensors for use with Freestyle Scanner, Normal, Disp-6 Kit,R-390 day supply always preferred if possible      furosemide (Lasix) 40 mg tablet Take 1 Tab by mouth daily. , Normal, Disp-30 Tab,R-1      insulin glargine (Lantus Solostar U-100 Insulin) 100 unit/mL (3 mL) inpn adminster 30 units subcut daily, Normal, Disp-10 Adjustable Dose Pre-filled Pen Syringe,R-3      insulin aspart U-100 (NovoLOG Flexpen U-100 Insulin) 100 unit/mL (3 mL) inpn (Novolog or Humalog, whichever more preferred: Inject 5 units with each meal + correction insulin, up to 30 units per day, Normal, Disp-10 Adjustable Dose Pre-filled Pen Syringe,R-3      pantoprazole (PROTONIX) 40 mg tablet Take 1 Tab by mouth two (2) times a day., Print, Disp-60 Tab, R-0      tamsulosin (FLOMAX) 0.4 mg capsule Take 1 Cap by mouth daily. , Normal, Disp-30 Cap, R-1         STOP taking these medications       lisinopriL (PRINIVIL, ZESTRIL) 5 mg tablet Comments:   Reason for Stopping:                 Patient Follow Up Instructions:       1. Recommended diet: Diabetic     2. Recommended activity: Activity as tolerated    3. If you experience any of the following symptoms then please call your primary care physician or return to the emergency room if you cannot get hold of your doctor:    4. Wound Care: none    5. Lab work: Bmp in 1 week     6. Your home medication lisinopril is being held due to elevated creatinine. Discuss with your pcp about getting it  restarted in future    7. Bring these papers with you to your follow up appointments.  The papers will help your doctors be sure to continue the care plan from the hospital.        Follow-up Information     Follow up With Specialties Details Why Contact Info Jaret Perkins MD Internal Medicine  [facility] -hospital follow up  [address] [facility]  [facility]      Jaret Perkins MD Internal Medicine Schedule an appointment as soon as possible for a visit in 1 week  [address]  [phone]          ________________________________________________________________    Risk of deterioration: High    Condition at Discharge:  Stable  __________________________________________________________________    Disposition  Home with family, no needs    ____________________________________________________________________    Code Status: Full Code  ___________________________________________________________________      Total time in minutes spent coordinating this discharge (includes going over instructions, follow-up, prescriptions, and preparing report for sign off to her PCP) :  35  minutes    Signed:  Vito Arndt MD

## 2020-10-26 NOTE — PROGRESS NOTES
Patient contacted regarding COVID-19  risk. Discussed COVID-19 related testing which was not done at this time. Test results were not done. . Outreach made within 2 business days of discharge: Yes    Care Transition Nurse/ Ambulatory Care Manager/ LPN Care Coordinator contacted the patient by telephone to perform post discharge assessment. Verified name and  with patient as identifiers. Provided introduction to self, and explanation of the CTN/ACM/LPN role, and reason for call due to risk factors for infection and/or exposure to COVID-19. Symptoms reviewed with patient who verbalized the following symptoms: no new symptoms. Due to no new or worsening symptoms encounter was not routed to provider for escalation. Discussed follow-up appointments. If no appointment was previously scheduled, appointment scheduling offered: [facility] follow up appointment(s): No future appointments. Non-[facility] follow up appointment(s): none      Advance Care Planning:   Does patient have an Advance Directive: currently not on file; patient declined education    Patient has following risk factors of: diabetes. CTN/ACM/LPN reviewed discharge instructions, medical action plan and red flags such as increased shortness of breath, increasing fever and signs of decompensation with patient who verbalized understanding. Discussed exposure protocols and quarantine with CDC Guidelines What to do if you are sick with coronavirus disease .  Patient was given an opportunity for questions and concerns. The patient agrees to contact the [facility] exposure line [phone], [facility] [address]  ([phone]) and PCP office for questions related to their healthcare. CTN/ACM provided contact information for future needs. Reviewed and educated patient on any new and changed medications related to discharge diagnosis.     Patient/family/caregiver given information for [employer] and agrees to enroll no 14 day callbased on severity of symptoms and risk factors.

## 2020-10-27 LAB
BACTERIA SPEC CULT: NORMAL
SERVICE CMNT-IMP: NORMAL

## 2020-11-06 ENCOUNTER — PATIENT OUTREACH (OUTPATIENT)
Dept: CASE MANAGEMENT | Age: 40
End: 2020-11-06

## 2021-01-11 DIAGNOSIS — N52.9 ERECTILE DYSFUNCTION, UNSPECIFIED ERECTILE DYSFUNCTION TYPE: ICD-10-CM

## 2021-01-11 RX ORDER — SILDENAFIL 50 MG/1
TABLET, FILM COATED ORAL
Qty: 10 TAB | Refills: 0 | Status: SHIPPED | OUTPATIENT
Start: 2021-01-11 | End: 2021-12-06

## 2021-04-01 ENCOUNTER — OFFICE VISIT (OUTPATIENT)
Dept: PRIMARY CARE CLINIC | Age: 41
End: 2021-04-01
Payer: MEDICAID

## 2021-04-01 VITALS
SYSTOLIC BLOOD PRESSURE: 169 MMHG | TEMPERATURE: 97.5 F | WEIGHT: 139.6 LBS | HEART RATE: 86 BPM | BODY MASS INDEX: 20.68 KG/M2 | HEIGHT: 69 IN | OXYGEN SATURATION: 99 % | DIASTOLIC BLOOD PRESSURE: 98 MMHG | RESPIRATION RATE: 16 BRPM

## 2021-04-01 DIAGNOSIS — D64.9 ANEMIA, UNSPECIFIED TYPE: ICD-10-CM

## 2021-04-01 DIAGNOSIS — B37.0 THRUSH: ICD-10-CM

## 2021-04-01 DIAGNOSIS — E11.43 GASTROPARESIS DUE TO DM (HCC): ICD-10-CM

## 2021-04-01 DIAGNOSIS — Z87.19 HISTORY OF ESOPHAGITIS: ICD-10-CM

## 2021-04-01 DIAGNOSIS — R74.8 ELEVATED ALKALINE PHOSPHATASE LEVEL: ICD-10-CM

## 2021-04-01 DIAGNOSIS — E78.5 HYPERLIPIDEMIA, UNSPECIFIED HYPERLIPIDEMIA TYPE: ICD-10-CM

## 2021-04-01 DIAGNOSIS — N13.30 HYDRONEPHROSIS OF RIGHT KIDNEY: ICD-10-CM

## 2021-04-01 DIAGNOSIS — N40.1 BENIGN PROSTATIC HYPERPLASIA WITH LOWER URINARY TRACT SYMPTOMS, SYMPTOM DETAILS UNSPECIFIED: ICD-10-CM

## 2021-04-01 DIAGNOSIS — R30.0 DYSURIA: ICD-10-CM

## 2021-04-01 DIAGNOSIS — N52.9 ERECTILE DYSFUNCTION, UNSPECIFIED ERECTILE DYSFUNCTION TYPE: ICD-10-CM

## 2021-04-01 DIAGNOSIS — R10.2 SUPRAPUBIC PAIN: ICD-10-CM

## 2021-04-01 DIAGNOSIS — K31.84 GASTROPARESIS DUE TO DM (HCC): ICD-10-CM

## 2021-04-01 DIAGNOSIS — E11.9 DM TYPE 2, GOAL HBA1C < 7% (HCC): Primary | ICD-10-CM

## 2021-04-01 DIAGNOSIS — E55.9 VITAMIN D DEFICIENCY: ICD-10-CM

## 2021-04-01 DIAGNOSIS — I10 ESSENTIAL HYPERTENSION: ICD-10-CM

## 2021-04-01 DIAGNOSIS — Z12.5 PROSTATE CANCER SCREENING: ICD-10-CM

## 2021-04-01 DIAGNOSIS — N32.89 BLADDER WALL THICKENING: ICD-10-CM

## 2021-04-01 DIAGNOSIS — K52.9 COLITIS: ICD-10-CM

## 2021-04-01 DIAGNOSIS — G62.9 NEUROPATHY: ICD-10-CM

## 2021-04-01 LAB — HBA1C MFR BLD HPLC: 13.2 %

## 2021-04-01 PROCEDURE — 99214 OFFICE O/P EST MOD 30 MIN: CPT | Performed by: INTERNAL MEDICINE

## 2021-04-01 PROCEDURE — 83036 HEMOGLOBIN GLYCOSYLATED A1C: CPT | Performed by: INTERNAL MEDICINE

## 2021-04-01 RX ORDER — METOPROLOL TARTRATE 25 MG/1
25 TABLET, FILM COATED ORAL 2 TIMES DAILY
Qty: 60 TAB | Refills: 1 | Status: SHIPPED | OUTPATIENT
Start: 2021-04-01 | End: 2021-07-14 | Stop reason: SDUPTHER

## 2021-04-01 RX ORDER — INSULIN ASPART 100 [IU]/ML
INJECTION, SOLUTION INTRAVENOUS; SUBCUTANEOUS
Qty: 10 ADJUSTABLE DOSE PRE-FILLED PEN SYRINGE | Refills: 3 | Status: ON HOLD | OUTPATIENT
Start: 2021-04-01 | End: 2021-12-06 | Stop reason: SDUPTHER

## 2021-04-01 RX ORDER — INSULIN GLARGINE 100 [IU]/ML
INJECTION, SOLUTION SUBCUTANEOUS
Qty: 10 ADJUSTABLE DOSE PRE-FILLED PEN SYRINGE | Refills: 3 | Status: SHIPPED | OUTPATIENT
Start: 2021-04-01 | End: 2021-07-14 | Stop reason: SDUPTHER

## 2021-04-01 RX ORDER — GABAPENTIN 100 MG/1
100 CAPSULE ORAL 2 TIMES DAILY
Qty: 60 CAP | Refills: 3 | Status: SHIPPED | OUTPATIENT
Start: 2021-04-01 | End: 2021-07-14 | Stop reason: SDUPTHER

## 2021-04-01 RX ORDER — LISINOPRIL 20 MG/1
20 TABLET ORAL DAILY
Qty: 30 TAB | Refills: 3 | Status: SHIPPED | OUTPATIENT
Start: 2021-04-01 | End: 2021-07-14 | Stop reason: SDUPTHER

## 2021-04-01 RX ORDER — FLUCONAZOLE 200 MG/1
200 TABLET ORAL DAILY
Qty: 7 TAB | Refills: 0 | Status: SHIPPED | OUTPATIENT
Start: 2021-04-01 | End: 2021-04-08

## 2021-04-01 NOTE — PROGRESS NOTES
Chief Complaint   Patient presents with    Medication Refill    Diabetes     a1c     1. Have you been to the ER, urgent care clinic since your last visit? Hospitalized since your last visit? No    2. Have you seen or consulted any other health care providers outside of the [address] [place] [address] since your last visit? Include any pap smears or colon screening.  No

## 2021-04-08 NOTE — PROGRESS NOTES
Karina Castaneda is a 47 y.o.  male and presents with     Chief Complaint   Patient presents with    Medication Refill    Diabetes     a1c     Patient was seen in the office in September after discharge from the hospital with DKA. Patient had another admission to the hospital in the past few months for DKA. Blood pressure is elevated. Patient's blood sugars are also elevated. He has mild difficulty swallowing. He has a history of esophagitis in the past.  His mild suprapubic pain. Sometimes difficulty voiding urine. Past Medical History:   Diagnosis Date    Bipolar 1 disorder, depressed ([address]     Bipolar disorder ([address]     Depression     Diabetes ([address]     DKA, type 1 ([facility] 1/27/2013    diagnosed age 23    H/O noncompliance with medical treatment, presenting hazards to health     MRSA (methicillin resistant staph aureus) culture positive     MRSA (methicillin resistant Staphylococcus aureus)     Face    Noncompliance with medication regimen     Second hand smoke exposure     Seizure ([facility]     Seizures ([facility] 2006 or 2007    one episode during skilled nursing     Past Surgical History:   Procedure Laterality Date    HX HEENT      top left wisdom tooth    HX ORTHOPAEDIC Left     wrist; MCV    UPPER GI ENDOSCOPY,BIOPSY  11/20/2018          Current Outpatient Medications   Medication Sig    gabapentin (NEURONTIN) 100 mg capsule Take 1 Cap by mouth two (2) times a day. Max Daily Amount: 200 mg.    metoprolol tartrate (LOPRESSOR) 25 mg tablet Take 1 Tab by mouth two (2) times a day.  insulin glargine (Lantus Solostar U-100 Insulin) 100 unit/mL (3 mL) inpn adminster 30 units subcut daily    insulin aspart U-100 (NovoLOG Flexpen U-100 Insulin) 100 unit/mL (3 mL) inpn (Novolog or Humalog, whichever more preferred: Inject 5 units with each meal + correction insulin, up to 30 units per day    lisinopriL (PRINIVIL, ZESTRIL) 20 mg tablet Take 1 Tab by mouth daily.     fluconazole (DIFLUCAN) 200 mg tablet Take 1 Tab by mouth daily for 7 days. FDA advises cautious prescribing of oral fluconazole in pregnancy.  potassium chloride (K-DUR, KLOR-CON) 20 mEq tablet Take 1 tablet by mouth once daily    ergocalciferol (ERGOCALCIFEROL) 1,250 mcg (50,000 unit) capsule Take 1 capsule by mouth once a week    gemfibroziL (LOPID) 600 mg tablet Take 1 tablet by mouth twice daily    sildenafil citrate (VIAGRA) 50 mg tablet TAKE 1 TABLET BY MOUTH AS NEEDED    flash glucose sensor (FreeStyle Connor 14 Day Sensor) kit 6 (14 day) sensors for use with Freestyle Scanner    furosemide (Lasix) 40 mg tablet Take 1 Tab by mouth daily.  pantoprazole (PROTONIX) 40 mg tablet Take 1 Tab by mouth two (2) times a day.  tamsulosin (FLOMAX) 0.4 mg capsule Take 1 Cap by mouth daily. No current facility-administered medications for this visit. Health Maintenance   Topic Date Due    COVID-19 Vaccine (1) Never done    Pneumococcal 0-64 years (1 of 1 - PPSV23) Never done    Foot Exam Q1  03/07/2021    A1C test (Diabetic or Prediabetic)  07/01/2021    Flu Vaccine (Season Ended) 09/01/2021    MICROALBUMIN Q1  04/01/2022    Lipid Screen  04/01/2022    Eye Exam Retinal or Dilated  06/18/2022    DTaP/Tdap/Td series (2 - Td) 03/09/2028    Hepatitis C Screening  Completed     Immunization History   Administered Date(s) Administered    (RETIRED) Pneumococcal Vaccine (Unspecified Type) 08/18/2011    TD Vaccine 03/08/2011    Tdap 03/09/2018     No LMP for male patient. Allergies and Intolerances:   No Known Allergies    Family History:   Family History   Problem Relation Age of Onset    Diabetes Mother     Diabetes Other         neice, type 1        Social History:   He  reports that he quit smoking about 13 months ago. His smoking use included cigarettes. He has a 1.60 pack-year smoking history. He has never used smokeless tobacco.  He  reports no history of alcohol use.             Review of Systems:   General: negative for - chills, fatigue, fever, weight change  Psych: negative for - anxiety, depression, irritability or mood swings  ENT: negative for - headaches, hearing change, nasal congestion, oral lesions, sneezing or sore throat  Heme/ Lymph: negative for - bleeding problems, bruising, pallor or swollen lymph nodes  Endo: negative for - hot flashes, polydipsia/polyuria or temperature intolerance  Resp: negative for - cough, shortness of breath or wheezing  CV: negative for - chest pain, edema or palpitations  GI: negative for - abdominal pain, change in bowel habits, constipation, diarrhea or nausea/vomiting  : negative for - dysuria, hematuria, incontinence, pelvic pain or vulvar/vaginal symptoms  MSK: negative for - joint pain, joint swelling or muscle pain  Neuro: negative for - confusion, headaches, seizures or weakness  Derm: negative for - dry skin, hair changes, rash or skin lesion changes          Physical:   Vitals:   Vitals:    04/01/21 0911 04/01/21 0914   BP: (!) 188/100 (!) 169/98   Pulse: 86    Resp: 16    Temp: 97.5 °F (36.4 °C)    TempSrc: Temporal    SpO2: 99%    Weight: 139 lb 9.6 oz (63.3 kg)    Height: 5' 9\" (1.753 m)            Exam:   HEENT- atraumatic,normocephalic, awake, oriented, well nourished, thrush ? Neck - supple,no enlarged lymph nodes, no JVD, no thyromegaly  Chest- CTA, no rhonchi, no crackles  Heart- rrr, no murmurs / gallop/rub  Abdomen- soft,BS+,NT, no hepatosplenomegaly  Ext - no c/c/edema   Neuro- no focal deficits. Power 5/5 all extremities  Skin - warm,dry, no obvious rashes.           Review of Data:   LABS:   Lab Results   Component Value Date/Time    WBC 5.8 04/01/2021 10:00 AM    HGB 13.4 04/01/2021 10:00 AM    HCT 39.7 04/01/2021 10:00 AM    PLATELET 624 77/25/4572 10:00 AM     Lab Results   Component Value Date/Time    Sodium 130 (L) 04/01/2021 10:00 AM    Potassium 4.7 04/01/2021 10:00 AM    Chloride 95 (L) 04/01/2021 10:00 AM    CO2 28 04/01/2021 10:00 AM    Glucose 442 (H) 04/01/2021 10:00 AM    BUN 27 (H) 04/01/2021 10:00 AM    Creatinine 0.93 04/01/2021 10:00 AM     Lab Results   Component Value Date/Time    Cholesterol, total 410 (H) 04/01/2021 10:00 AM    HDL Cholesterol 66 04/01/2021 10:00 AM    LDL, calculated 282.6 (H) 04/01/2021 10:00 AM    Triglyceride 307 (H) 04/01/2021 10:00 AM     No components found for: GPT        Impression / Plan:        ICD-10-CM ICD-9-CM    1. DM type 2, goal HbA1c < 7% (formerly [facility])  E11.9 250.00 AMB POC HEMOGLOBIN A1C      REFERRAL TO ENDOCRINOLOGY      LIPID PANEL      METABOLIC PANEL, COMPREHENSIVE      CBC WITH AUTOMATED DIFF      MICROALBUMIN, UR, RAND W/ MICROALB/CREAT RATIO      insulin glargine (Lantus Solostar U-100 Insulin) 100 unit/mL (3 mL) inpn   2. Hyperlipidemia, unspecified hyperlipidemia type  E78.5 272.4 REFERRAL TO ENDOCRINOLOGY   3. Erectile dysfunction, unspecified erectile dysfunction type  N52.9 607.84 REFERRAL TO ENDOCRINOLOGY   4. Essential hypertension  I10 401.9 metoprolol tartrate (LOPRESSOR) 25 mg tablet      lisinopriL (PRINIVIL, ZESTRIL) 20 mg tablet   5. Benign prostatic hyperplasia with lower urinary tract symptoms, symptom details unspecified  N40.1 600.01 REFERRAL TO UROLOGY   6. Bladder wall thickening  N32.89 596.89 REFERRAL TO UROLOGY   7. Elevated alkaline phosphatase level  R74.8 790.5    8. Colitis  K52.9 558.9 REFERRAL TO GASTROENTEROLOGY   9. Hydronephrosis of right kidney  N13.30 591    10. Gastroparesis due to DM (HCC)  E11.43 250.60 REFERRAL TO GASTROENTEROLOGY    K31.84 536.3 REFERRAL TO ENDOCRINOLOGY   11. History of esophagitis  Z87.19 V12.79 REFERRAL TO GASTROENTEROLOGY   12. Anemia, unspecified type  D64.9 285.9 IRON PROFILE      FERRITIN   13. Neuropathy  G62.9 355.9 gabapentin (NEURONTIN) 100 mg capsule   14. Vitamin D deficiency  E55.9 268.9 VITAMIN D, 25 HYDROXY   15. Suprapubic pain  R10.2 789.09    16. Thrush  B37.0 112.0 fluconazole (DIFLUCAN) 200 mg tablet   17. Dysuria  R30.0 788. 1 URINALYSIS W/ RFLX MICROSCOPIC   18. Prostate cancer screening  Z12.5 V76.44 PSA, DIAGNOSTIC (PROSTATE SPECIFIC AG)         Explained to patient risk benefits of the medications. Advised patient to stop meds if having any side effects. Pt verbalized understanding of the instructions. I have discussed the diagnosis with the patient and the intended plan as seen in the above orders. The patient has received an after-visit summary and questions were answered concerning future plans. I have discussed medication side effects and warnings with the patient as well. I have reviewed the plan of care with the patient, accepted their input and they are in agreement with the treatment goals. Reviewed plan of care. Patient has provided input and agrees with goals. Follow-up and Dispositions    · Return in about 1 week (around 4/8/2021).          Reji Damon MD

## 2021-05-28 ENCOUNTER — TELEPHONE (OUTPATIENT)
Dept: ENDOCRINOLOGY | Age: 41
End: 2021-05-28

## 2021-05-28 NOTE — TELEPHONE ENCOUNTER
I attempt to start pt's virtual visit today but I was made aware by the pt that he's currently at  [facility] getting ready to start a catheretization and needed to cancel today's appt.

## 2021-06-01 DIAGNOSIS — E11.9 DM TYPE 2, GOAL HBA1C < 7% (HCC): ICD-10-CM

## 2021-06-01 DIAGNOSIS — E78.5 HYPERLIPIDEMIA, UNSPECIFIED HYPERLIPIDEMIA TYPE: ICD-10-CM

## 2021-06-01 DIAGNOSIS — E55.9 VITAMIN D DEFICIENCY: ICD-10-CM

## 2021-06-01 DIAGNOSIS — N52.9 ERECTILE DYSFUNCTION, UNSPECIFIED ERECTILE DYSFUNCTION TYPE: ICD-10-CM

## 2021-06-01 RX ORDER — LISINOPRIL 5 MG/1
TABLET ORAL
Qty: 30 TABLET | Refills: 0 | OUTPATIENT
Start: 2021-06-01

## 2021-06-01 RX ORDER — SILDENAFIL 50 MG/1
TABLET, FILM COATED ORAL
Qty: 10 TABLET | Refills: 0 | OUTPATIENT
Start: 2021-06-01

## 2021-06-01 RX ORDER — POTASSIUM CHLORIDE 20 MEQ/1
TABLET, EXTENDED RELEASE ORAL
Qty: 30 TABLET | Refills: 0 | OUTPATIENT
Start: 2021-06-01

## 2021-06-01 RX ORDER — GEMFIBROZIL 600 MG/1
TABLET, FILM COATED ORAL
Qty: 60 TABLET | Refills: 0 | OUTPATIENT
Start: 2021-06-01

## 2021-06-01 RX ORDER — ERGOCALCIFEROL 1.25 MG/1
CAPSULE ORAL
Qty: 12 CAPSULE | Refills: 0 | OUTPATIENT
Start: 2021-06-01

## 2021-06-01 RX ORDER — INSULIN GLARGINE 100 [IU]/ML
INJECTION, SOLUTION SUBCUTANEOUS
Qty: 30 ML | Refills: 0 | OUTPATIENT
Start: 2021-06-01

## 2021-06-08 NOTE — PROGRESS NOTES
Libertad is here today for a 4 month f/u for ongoing management and tx of essential thrombocytosis. Today is a 4 month f/u with labs and OV with Olvin Villanueva NP. María Huddleston     Pharmacy Clarification of Prior to Admission Medication Regimen     The patient was interviewed regarding clarification of the prior to admission medication regimen and use of any other inhalers, topical products, over the counter medications, herbal medications, vitamin products or ophthalmic/nasal/otic medication use. Information Obtained From: Patient and Outpatient Pharmacy    Pertinent Pharmacy Findings:   insulin regular (NOVOLIN R) 100 unit/mL injection: Patient stated that he takes this agent, but 'does not take it like I am supposed to.' Patient stated that this is a sliding scale and he is not sure of exact sliding scale. MHT called patient's outpatient pharmacy,  [facility], and spoke with Beto Bridges, Josy, who stated that patient had not gotten this agent filled there since 2016 and was unable to confirm current scale. Patient stated that his 'sugar was 190 this morning and I took about 2 or 3 units.'    PTA medication list was corrected to the following:     Prior to Admission Medications   Prescriptions Last Dose Informant Patient Reported? Taking?   acetaminophen (TYLENOL EXTRA STRENGTH) 500 mg tablet 2018 at Unknown time Self Yes Yes   Sig: Take 1,000 mg by mouth two (2) times daily as needed ('Pain/Headache'). insulin glargine (LANTUS) 100 unit/mL injection 2018 at Unknown time Self Yes Yes   Si Units by SubCUTAneous route daily. insulin regular (NOVOLIN R) 100 unit/mL injection 2018 at Unknown time Self Yes Yes   Si-10 Units by SubCUTAneous route See Admin Instructions. Take 3-4 times daily per sliding scale;  Patient is not sure of exact scale.'      Facility-Administered Medications: None          Thank you,  Luisito Rios [facility]  Medication History Pharmacy Technician

## 2021-06-20 ENCOUNTER — APPOINTMENT (OUTPATIENT)
Dept: GENERAL RADIOLOGY | Age: 41
End: 2021-06-20
Attending: EMERGENCY MEDICINE
Payer: MEDICAID

## 2021-06-20 ENCOUNTER — HOSPITAL ENCOUNTER (EMERGENCY)
Age: 41
Discharge: HOME | End: 2021-06-20
Attending: EMERGENCY MEDICINE
Payer: MEDICAID

## 2021-06-20 VITALS
RESPIRATION RATE: 18 BRPM | DIASTOLIC BLOOD PRESSURE: 71 MMHG | HEIGHT: 69 IN | WEIGHT: 139.77 LBS | HEART RATE: 93 BPM | BODY MASS INDEX: 20.7 KG/M2 | SYSTOLIC BLOOD PRESSURE: 121 MMHG | TEMPERATURE: 98.4 F | OXYGEN SATURATION: 98 %

## 2021-06-20 DIAGNOSIS — R07.89 MUSCULOSKELETAL CHEST PAIN: Primary | ICD-10-CM

## 2021-06-20 DIAGNOSIS — Z79.4 TYPE 2 DIABETES MELLITUS WITH HYPERGLYCEMIA, WITH LONG-TERM CURRENT USE OF INSULIN (HCC): ICD-10-CM

## 2021-06-20 DIAGNOSIS — E11.65 TYPE 2 DIABETES MELLITUS WITH HYPERGLYCEMIA, WITH LONG-TERM CURRENT USE OF INSULIN (HCC): ICD-10-CM

## 2021-06-20 LAB
ALBUMIN SERPL-MCNC: 1.8 G/DL (ref 3.5–5)
ALBUMIN/GLOB SERPL: 0.5 {RATIO} (ref 1.1–2.2)
ALP SERPL-CCNC: 163 U/L (ref 45–117)
ALT SERPL-CCNC: 29 U/L (ref 12–78)
ANION GAP SERPL CALC-SCNC: 6 MMOL/L (ref 5–15)
AST SERPL-CCNC: 24 U/L (ref 15–37)
BASOPHILS # BLD: 0 K/UL (ref 0–0.1)
BASOPHILS NFR BLD: 1 % (ref 0–1)
BILIRUB SERPL-MCNC: 0.2 MG/DL (ref 0.2–1)
BUN SERPL-MCNC: 20 MG/DL (ref 6–20)
BUN/CREAT SERPL: 16 (ref 12–20)
CALCIUM SERPL-MCNC: 8.6 MG/DL (ref 8.5–10.1)
CHLORIDE SERPL-SCNC: 95 MMOL/L (ref 97–108)
CO2 SERPL-SCNC: 28 MMOL/L (ref 21–32)
CREAT SERPL-MCNC: 1.27 MG/DL (ref 0.7–1.3)
DIFFERENTIAL METHOD BLD: ABNORMAL
EOSINOPHIL # BLD: 0.1 K/UL (ref 0–0.4)
EOSINOPHIL NFR BLD: 1 % (ref 0–7)
ERYTHROCYTE [DISTWIDTH] IN BLOOD BY AUTOMATED COUNT: 12.5 % (ref 11.5–14.5)
GLOBULIN SER CALC-MCNC: 4 G/DL (ref 2–4)
GLUCOSE BLD STRIP.AUTO-MCNC: 280 MG/DL (ref 65–117)
GLUCOSE BLD STRIP.AUTO-MCNC: 321 MG/DL (ref 65–117)
GLUCOSE SERPL-MCNC: 471 MG/DL (ref 65–100)
HCT VFR BLD AUTO: 39.9 % (ref 36.6–50.3)
HGB BLD-MCNC: 13.6 G/DL (ref 12.1–17)
IMM GRANULOCYTES # BLD AUTO: 0 K/UL (ref 0–0.04)
IMM GRANULOCYTES NFR BLD AUTO: 0 % (ref 0–0.5)
LYMPHOCYTES # BLD: 0.9 K/UL (ref 0.8–3.5)
LYMPHOCYTES NFR BLD: 12 % (ref 12–49)
MCH RBC QN AUTO: 31.2 PG (ref 26–34)
MCHC RBC AUTO-ENTMCNC: 34.1 G/DL (ref 30–36.5)
MCV RBC AUTO: 91.5 FL (ref 80–99)
MONOCYTES # BLD: 0.7 K/UL (ref 0–1)
MONOCYTES NFR BLD: 10 % (ref 5–13)
NEUTS SEG # BLD: 5.4 K/UL (ref 1.8–8)
NEUTS SEG NFR BLD: 76 % (ref 32–75)
NRBC # BLD: 0 K/UL (ref 0–0.01)
NRBC BLD-RTO: 0 PER 100 WBC
PLATELET # BLD AUTO: 320 K/UL (ref 150–400)
PMV BLD AUTO: 10.8 FL (ref 8.9–12.9)
POTASSIUM SERPL-SCNC: 4.8 MMOL/L (ref 3.5–5.1)
PROT SERPL-MCNC: 5.8 G/DL (ref 6.4–8.2)
RBC # BLD AUTO: 4.36 M/UL (ref 4.1–5.7)
SERVICE CMNT-IMP: ABNORMAL
SERVICE CMNT-IMP: ABNORMAL
SODIUM SERPL-SCNC: 129 MMOL/L (ref 136–145)
TROPONIN I SERPL-MCNC: <0.05 NG/ML
WBC # BLD AUTO: 7.1 K/UL (ref 4.1–11.1)

## 2021-06-20 PROCEDURE — 99285 EMERGENCY DEPT VISIT HI MDM: CPT

## 2021-06-20 PROCEDURE — 96361 HYDRATE IV INFUSION ADD-ON: CPT

## 2021-06-20 PROCEDURE — 71046 X-RAY EXAM CHEST 2 VIEWS: CPT

## 2021-06-20 PROCEDURE — 74011636637 HC RX REV CODE- 636/637: Performed by: EMERGENCY MEDICINE

## 2021-06-20 PROCEDURE — 85025 COMPLETE CBC W/AUTO DIFF WBC: CPT

## 2021-06-20 PROCEDURE — 93005 ELECTROCARDIOGRAM TRACING: CPT

## 2021-06-20 PROCEDURE — 82962 GLUCOSE BLOOD TEST: CPT

## 2021-06-20 PROCEDURE — 96360 HYDRATION IV INFUSION INIT: CPT

## 2021-06-20 PROCEDURE — 96376 TX/PRO/DX INJ SAME DRUG ADON: CPT

## 2021-06-20 PROCEDURE — 84484 ASSAY OF TROPONIN QUANT: CPT

## 2021-06-20 PROCEDURE — 36415 COLL VENOUS BLD VENIPUNCTURE: CPT

## 2021-06-20 PROCEDURE — 74011250636 HC RX REV CODE- 250/636: Performed by: EMERGENCY MEDICINE

## 2021-06-20 PROCEDURE — 80053 COMPREHEN METABOLIC PANEL: CPT

## 2021-06-20 PROCEDURE — 96374 THER/PROPH/DIAG INJ IV PUSH: CPT

## 2021-06-20 PROCEDURE — 96372 THER/PROPH/DIAG INJ SC/IM: CPT

## 2021-06-20 PROCEDURE — 74011250637 HC RX REV CODE- 250/637: Performed by: EMERGENCY MEDICINE

## 2021-06-20 PROCEDURE — 74011000250 HC RX REV CODE- 250: Performed by: EMERGENCY MEDICINE

## 2021-06-20 RX ORDER — LIDOCAINE 4 G/100G
1 PATCH TOPICAL EVERY 24 HOURS
Status: DISCONTINUED | OUTPATIENT
Start: 2021-06-20 | End: 2021-06-20 | Stop reason: HOSPADM

## 2021-06-20 RX ORDER — METHOCARBAMOL 750 MG/1
750 TABLET, FILM COATED ORAL 4 TIMES DAILY
Status: DISCONTINUED | OUTPATIENT
Start: 2021-06-20 | End: 2021-06-20

## 2021-06-20 RX ORDER — METHOCARBAMOL 750 MG/1
750 TABLET, FILM COATED ORAL ONCE
Status: COMPLETED | OUTPATIENT
Start: 2021-06-20 | End: 2021-06-20

## 2021-06-20 RX ORDER — METHOCARBAMOL 500 MG/1
500 TABLET, FILM COATED ORAL 4 TIMES DAILY
Qty: 20 TABLET | Refills: 0 | Status: SHIPPED | OUTPATIENT
Start: 2021-06-20 | End: 2021-09-23

## 2021-06-20 RX ORDER — NAPROXEN 500 MG/1
500 TABLET ORAL
Qty: 20 TABLET | Refills: 0 | Status: SHIPPED | OUTPATIENT
Start: 2021-06-20 | End: 2021-09-25

## 2021-06-20 RX ADMIN — SODIUM CHLORIDE 1000 ML: 9 INJECTION, SOLUTION INTRAVENOUS at 16:16

## 2021-06-20 RX ADMIN — METHOCARBAMOL TABLETS 750 MG: 750 TABLET, COATED ORAL at 16:00

## 2021-06-20 RX ADMIN — HUMAN INSULIN 5 UNITS: 100 INJECTION, SOLUTION SUBCUTANEOUS at 17:55

## 2021-06-20 RX ADMIN — HUMAN INSULIN 10 UNITS: 100 INJECTION, SOLUTION SUBCUTANEOUS at 16:16

## 2021-06-20 NOTE — ED PROVIDER NOTES
EMERGENCY DEPARTMENT HISTORY AND PHYSICAL EXAM      Date: 6/20/2021  Patient Name: Chilango Garland  Patient Age and Sex: 47 y.o. male     History of Presenting Illness     Chief Complaint   Patient presents with    Chest Pain     Ambulatory into the ED with c/o Lt sided CP and Rt groin pain - pain started after having a cardiac cath a few weeks ago at [facility]. Pt states, \"they were looking for blood clots and didn't find any, but they found a spot on my lung. \"       History Provided By: Patient    HPI: Chilango Garland is a 72-year-old male with a history of hypertension, smoking, presenting with bilateral chest pain. Patient states that for the past 2 days has been having bilateral chest pain. Associated with some shortness of breath but denies any nausea or vomiting. At [facility] a few weeks ago had a cardiac cath which was completely negative. They did notice a spot on his lung. Patient states that today he has not taken any of his diabetes medications including his insulin and has not checked his sugars. Has been feeling dehydrated with some polyuria and polydipsia. There are no other complaints, changes, or physical findings at this time. PCP: Yoel Alarcon MD    No current facility-administered medications on file prior to encounter. Current Outpatient Medications on File Prior to Encounter   Medication Sig Dispense Refill    gabapentin (NEURONTIN) 100 mg capsule Take 1 Cap by mouth two (2) times a day. Max Daily Amount: 200 mg. 60 Cap 3    metoprolol tartrate (LOPRESSOR) 25 mg tablet Take 1 Tab by mouth two (2) times a day.  60 Tab 1    insulin glargine (Lantus Solostar U-100 Insulin) 100 unit/mL (3 mL) inpn adminster 30 units subcut daily 10 Adjustable Dose Pre-filled Pen Syringe 3    insulin aspart U-100 (NovoLOG Flexpen U-100 Insulin) 100 unit/mL (3 mL) inpn (Novolog or Humalog, whichever more preferred: Inject 5 units with each meal + correction insulin, up to 30 units per day 10 Adjustable Dose Pre-filled Pen Syringe 3    lisinopriL (PRINIVIL, ZESTRIL) 20 mg tablet Take 1 Tab by mouth daily. 30 Tab 3    potassium chloride (K-DUR, KLOR-CON) 20 mEq tablet Take 1 tablet by mouth once daily 30 Tab 0    ergocalciferol (ERGOCALCIFEROL) 1,250 mcg (50,000 unit) capsule Take 1 capsule by mouth once a week 12 Cap 0    gemfibroziL (LOPID) 600 mg tablet Take 1 tablet by mouth twice daily 60 Tab 0    sildenafil citrate (VIAGRA) 50 mg tablet TAKE 1 TABLET BY MOUTH AS NEEDED 10 Tab 0    flash glucose sensor (FreeStyle Connor 14 Day Sensor) kit 6 (14 day) sensors for use with Freestyle Scanner 6 Kit 3    furosemide (Lasix) 40 mg tablet Take 1 Tab by mouth daily. 30 Tab 1    pantoprazole (PROTONIX) 40 mg tablet Take 1 Tab by mouth two (2) times a day. 60 Tab 0    tamsulosin (FLOMAX) 0.4 mg capsule Take 1 Cap by mouth daily.  30 Cap 1       Past History     Past Medical History:  Past Medical History:   Diagnosis Date    Bipolar 1 disorder, depressed ([address]     Bipolar disorder ([address]     Depression     Diabetes ([address]     DKA, type 1 ([address] 1/27/2013    diagnosed age 23    H/O noncompliance with medical treatment, presenting hazards to health     MRSA (methicillin resistant staph aureus) culture positive     MRSA (methicillin resistant Staphylococcus aureus)     Face    Noncompliance with medication regimen     Second hand smoke exposure     Seizure ([address]     Seizures ([address] 2006 or 2007    one episode during prison       Past Surgical History:  Past Surgical History:   Procedure Laterality Date    HX HEENT      top left wisdom tooth    HX ORTHOPAEDIC Left     wrist; MCV    UPPER GI ENDOSCOPY,BIOPSY  11/20/2018            Family History:  Family History   Problem Relation Age of Onset    Diabetes Mother     Diabetes Other         neice, type 1        Social History:  Social History     Tobacco Use    Smoking status: Former Smoker     Packs/day: 0.10     Years: 16.00     Pack years: 1.60 Types: Cigarettes     Quit date: 2020     Years since quittin.3    Smokeless tobacco: Never Used   Substance Use Topics    Alcohol use: No    Drug use: No       Allergies:  No Known Allergies      Review of Systems   Review of Systems   Constitutional: Negative for chills and fever. Respiratory: Positive for shortness of breath. Negative for cough. Cardiovascular: Positive for chest pain. Gastrointestinal: Negative for abdominal pain, constipation, diarrhea, nausea and vomiting. Endocrine: Positive for polydipsia and polyuria. Genitourinary: Negative for dysuria, frequency and hematuria. Neurological: Negative for weakness and numbness. All other systems reviewed and are negative. Physical Exam   Physical Exam  Vitals and nursing note reviewed. Constitutional:       Appearance: He is well-developed. HENT:      Head: Normocephalic and atraumatic. Comments: Very dry mucous membranes     Nose: Nose normal.      Mouth/Throat:      Mouth: Mucous membranes are moist.   Eyes:      Extraocular Movements: Extraocular movements intact. Conjunctiva/sclera: Conjunctivae normal.   Cardiovascular:      Rate and Rhythm: Normal rate and regular rhythm. Pulmonary:      Effort: Pulmonary effort is normal. No respiratory distress. Breath sounds: Normal breath sounds. Chest:      Chest wall: Tenderness present. Comments: Patient is tender all over his chest wall on palpation. Abdominal:      General: There is no distension. Palpations: Abdomen is soft. Tenderness: There is no abdominal tenderness. Musculoskeletal:         General: Normal range of motion. Cervical back: Normal range of motion and neck supple. Skin:     General: Skin is warm and dry. Neurological:      General: No focal deficit present. Mental Status: He is alert and oriented to person, place, and time. Mental status is at baseline.    Psychiatric:         Mood and Affect: Mood normal.          Diagnostic Study Results     Labs -     Recent Results (from the past 12 hour(s))   EKG, 12 LEAD, INITIAL    Collection Time: 06/20/21  1:27 PM   Result Value Ref Range    Ventricular Rate 91 BPM    Atrial Rate 91 BPM    P-R Interval 154 ms    QRS Duration 88 ms    Q-T Interval 362 ms    QTC Calculation (Bezet) 445 ms    Calculated P Axis 83 degrees    Calculated R Axis 105 degrees    Calculated T Axis 76 degrees    Diagnosis       Normal sinus rhythm  Rightward axis  Pulmonary disease pattern  When compared with ECG of 22-OCT-2020 18:09,  QRS axis shifted right  Criteria for Inferior infarct are no longer present  Non-specific change in ST segment in Inferior leads  T wave inversion no longer evident in Inferior leads  T wave amplitude has decreased in Lateral leads     CBC WITH AUTOMATED DIFF    Collection Time: 06/20/21  1:41 PM   Result Value Ref Range    WBC 7.1 4.1 - 11.1 K/uL    RBC 4.36 4.10 - 5.70 M/uL    HGB 13.6 12.1 - 17.0 g/dL    HCT 39.9 36.6 - 50.3 %    MCV 91.5 80.0 - 99.0 FL    MCH 31.2 26.0 - 34.0 PG    MCHC 34.1 30.0 - 36.5 g/dL    RDW 12.5 11.5 - 14.5 %    PLATELET 442 775 - 696 K/uL    MPV 10.8 8.9 - 12.9 FL    NRBC 0.0 0  WBC    ABSOLUTE NRBC 0.00 0.00 - 0.01 K/uL    NEUTROPHILS 76 (H) 32 - 75 %    LYMPHOCYTES 12 12 - 49 %    MONOCYTES 10 5 - 13 %    EOSINOPHILS 1 0 - 7 %    BASOPHILS 1 0 - 1 %    IMMATURE GRANULOCYTES 0 0.0 - 0.5 %    ABS. NEUTROPHILS 5.4 1.8 - 8.0 K/UL    ABS. LYMPHOCYTES 0.9 0.8 - 3.5 K/UL    ABS. MONOCYTES 0.7 0.0 - 1.0 K/UL    ABS. EOSINOPHILS 0.1 0.0 - 0.4 K/UL    ABS. BASOPHILS 0.0 0.0 - 0.1 K/UL    ABS. IMM.  GRANS. 0.0 0.00 - 0.04 K/UL    DF AUTOMATED     METABOLIC PANEL, COMPREHENSIVE    Collection Time: 06/20/21  1:41 PM   Result Value Ref Range    Sodium 129 (L) 136 - 145 mmol/L    Potassium 4.8 3.5 - 5.1 mmol/L    Chloride 95 (L) 97 - 108 mmol/L    CO2 28 21 - 32 mmol/L    Anion gap 6 5 - 15 mmol/L    Glucose 471 (H) 65 - 100 mg/dL    BUN 20 6 - 20 MG/DL    Creatinine 1.27 0.70 - 1.30 MG/DL    BUN/Creatinine ratio 16 12 - 20      GFR est AA >60 >60 ml/min/1.73m2    GFR est non-AA >60 >60 ml/min/1.73m2    Calcium 8.6 8.5 - 10.1 MG/DL    Bilirubin, total 0.2 0.2 - 1.0 MG/DL    ALT (SGPT) 29 12 - 78 U/L    AST (SGOT) 24 15 - 37 U/L    Alk. phosphatase 163 (H) 45 - 117 U/L    Protein, total 5.8 (L) 6.4 - 8.2 g/dL    Albumin 1.8 (L) 3.5 - 5.0 g/dL    Globulin 4.0 2.0 - 4.0 g/dL    A-G Ratio 0.5 (L) 1.1 - 2.2     TROPONIN I    Collection Time: 06/20/21  1:41 PM   Result Value Ref Range    Troponin-I, Qt. <0.05 <0.05 ng/mL       Radiologic Studies -   XR CHEST PA LAT   Final Result   No acute cardiopulmonary process. CT Results  (Last 48 hours)    None        CXR Results  (Last 48 hours)               06/20/21 1355  XR CHEST PA LAT Final result    Impression:  No acute cardiopulmonary process. Narrative:  EXAM:  XR CHEST PA LAT       INDICATION:   CP       COMPARISON: Chest radiograph 8/5/2020. FINDINGS: PA and lateral radiographs of the chest were obtained. No evidence of focal consolidation. No pleural effusion or pneumothorax. Heart,   vince, mediastinum are within normal limits. No acute osseous abnormalities. Medical Decision Making   I am the first provider for this patient. I reviewed the vital signs, available nursing notes, past medical history, past surgical history, family history and social history. Vital Signs-Reviewed the patient's vital signs. Patient Vitals for the past 12 hrs:   Temp Pulse Resp BP SpO2   06/20/21 1515 -- 96 29 (!) 173/97 100 %   06/20/21 1328 98.4 °F (36.9 °C) 98 17 (!) 149/88 100 %       Records Reviewed: Nursing Notes and Old Medical Records    Provider Notes (Medical Decision Making):   Patient presenting with bilateral chest pain. Given that he had a cardiac cath just recently unlikely ACS. More likely musculoskeletal given the chest tenderness.   Will provide analgesics. Given his history, will just check a troponin as well as EKG and chest x-ray. His glucose is also elevated so will treat his hyperglycemia and make sure no DKA. ED Course:   Initial assessment performed. The patients presenting problems have been discussed, and they are in agreement with the care plan formulated and outlined with them. I have encouraged them to ask questions as they arise throughout their visit. Critical Care Time:   0    Disposition:  Discharge Note:  The patient has been re-evaluated and is ready for discharge. Reviewed available results with patient. Counseled patient on diagnosis and care plan. Patient has expressed understanding, and all questions have been answered. Patient agrees with plan and agrees to follow up as recommended, or to return to the ED if their symptoms worsen. Discharge instructions have been provided and explained to the patient, along with reasons to return to the ED. PLAN:  Current Discharge Medication List      START taking these medications    Details   methocarbamoL (Robaxin) 500 mg tablet Take 1 Tablet by mouth four (4) times daily. Qty: 20 Tablet, Refills: 0  Start date: 6/20/2021      naproxen (NAPROSYN) 500 mg tablet Take 1 Tablet by mouth every twelve (12) hours as needed for Pain. Qty: 20 Tablet, Refills: 0  Start date: 6/20/2021           2. Follow-up Information     Follow up With Specialties Details Why Yane Worthy MD Internal Medicine  As needed [address] [place] [address]  [phone]          3. Return to ED if worse     Diagnosis     Clinical Impression:   1. Musculoskeletal chest pain    2. Type 2 diabetes mellitus with hyperglycemia, with long-term current use of insulin ([facility])        Attestations:    Justin Zamudio M.D. Please note that this dictation was completed with [employer], the [employer] voice recognition software.   Quite often unanticipated grammatical, syntax, homophones, and other interpretive errors are inadvertently transcribed by the computer software. Please disregard these errors. Please excuse any errors that have escaped final proofreading. Thank you.

## 2021-06-20 NOTE — ED NOTES
Pt given discharge instructions. Saline lock removed. Discharged ambulatory with steady gait. No acute distress at time of departure. To lobby to wait for ride.

## 2021-06-20 NOTE — ED NOTES
Assumed care of pt from triage. Pt reports chest pain and shortness of breath. Notes this has been ongoing and several days ago he had a cardiac cath at [address] [place] [address] which reportedly showed no blockages. He also notes hx of DM. Has not taken his medications today. Positioned for comfort on stretcher. Call bell within reach. Monitor x3.

## 2021-06-21 LAB
ATRIAL RATE: 91 BPM
CALCULATED P AXIS, ECG09: 83 DEGREES
CALCULATED R AXIS, ECG10: 105 DEGREES
CALCULATED T AXIS, ECG11: 76 DEGREES
DIAGNOSIS, 93000: NORMAL
P-R INTERVAL, ECG05: 154 MS
Q-T INTERVAL, ECG07: 362 MS
QRS DURATION, ECG06: 88 MS
QTC CALCULATION (BEZET), ECG08: 445 MS
VENTRICULAR RATE, ECG03: 91 BPM

## 2021-07-06 ENCOUNTER — HOSPITAL ENCOUNTER (EMERGENCY)
Age: 41
Discharge: HOME | End: 2021-07-06
Attending: STUDENT IN AN ORGANIZED HEALTH CARE EDUCATION/TRAINING PROGRAM
Payer: MEDICAID

## 2021-07-06 VITALS
BODY MASS INDEX: 21.94 KG/M2 | WEIGHT: 148.15 LBS | HEIGHT: 69 IN | TEMPERATURE: 98.7 F | RESPIRATION RATE: 15 BRPM | DIASTOLIC BLOOD PRESSURE: 98 MMHG | SYSTOLIC BLOOD PRESSURE: 185 MMHG | HEART RATE: 81 BPM | OXYGEN SATURATION: 99 %

## 2021-07-06 DIAGNOSIS — M79.89 LEG SWELLING: Primary | ICD-10-CM

## 2021-07-06 LAB
ALBUMIN SERPL-MCNC: 1.3 G/DL (ref 3.5–5)
ALBUMIN/GLOB SERPL: 0.3 {RATIO} (ref 1.1–2.2)
ALP SERPL-CCNC: 191 U/L (ref 45–117)
ALT SERPL-CCNC: 27 U/L (ref 12–78)
ANION GAP SERPL CALC-SCNC: 4 MMOL/L (ref 5–15)
APPEARANCE UR: CLEAR
AST SERPL-CCNC: 14 U/L (ref 15–37)
BACTERIA URNS QL MICRO: NEGATIVE /HPF
BASOPHILS # BLD: 0.1 K/UL (ref 0–0.1)
BASOPHILS NFR BLD: 1 % (ref 0–1)
BILIRUB SERPL-MCNC: 0.2 MG/DL (ref 0.2–1)
BILIRUB UR QL: NEGATIVE
BNP SERPL-MCNC: 427 PG/ML
BUN SERPL-MCNC: 29 MG/DL (ref 6–20)
BUN/CREAT SERPL: 22 (ref 12–20)
CALCIUM SERPL-MCNC: 8.8 MG/DL (ref 8.5–10.1)
CHLORIDE SERPL-SCNC: 93 MMOL/L (ref 97–108)
CO2 SERPL-SCNC: 32 MMOL/L (ref 21–32)
COLOR UR: ABNORMAL
CREAT SERPL-MCNC: 1.32 MG/DL (ref 0.7–1.3)
DIFFERENTIAL METHOD BLD: ABNORMAL
EOSINOPHIL # BLD: 0.2 K/UL (ref 0–0.4)
EOSINOPHIL NFR BLD: 2 % (ref 0–7)
EPITH CASTS URNS QL MICRO: ABNORMAL /LPF
ERYTHROCYTE [DISTWIDTH] IN BLOOD BY AUTOMATED COUNT: 12.5 % (ref 11.5–14.5)
GLOBULIN SER CALC-MCNC: 4.2 G/DL (ref 2–4)
GLUCOSE BLD STRIP.AUTO-MCNC: 159 MG/DL (ref 65–117)
GLUCOSE SERPL-MCNC: 317 MG/DL (ref 65–100)
GLUCOSE UR STRIP.AUTO-MCNC: >1000 MG/DL
HCT VFR BLD AUTO: 32.4 % (ref 36.6–50.3)
HGB BLD-MCNC: 11.5 G/DL (ref 12.1–17)
HGB UR QL STRIP: ABNORMAL
HYALINE CASTS URNS QL MICRO: ABNORMAL /LPF (ref 0–5)
IMM GRANULOCYTES # BLD AUTO: 0.1 K/UL (ref 0–0.04)
IMM GRANULOCYTES NFR BLD AUTO: 1 % (ref 0–0.5)
KETONES UR QL STRIP.AUTO: NEGATIVE MG/DL
LEUKOCYTE ESTERASE UR QL STRIP.AUTO: NEGATIVE
LYMPHOCYTES # BLD: 1.8 K/UL (ref 0.8–3.5)
LYMPHOCYTES NFR BLD: 19 % (ref 12–49)
MCH RBC QN AUTO: 30.8 PG (ref 26–34)
MCHC RBC AUTO-ENTMCNC: 35.5 G/DL (ref 30–36.5)
MCV RBC AUTO: 86.9 FL (ref 80–99)
MONOCYTES # BLD: 0.9 K/UL (ref 0–1)
MONOCYTES NFR BLD: 9 % (ref 5–13)
NEUTS SEG # BLD: 6.7 K/UL (ref 1.8–8)
NEUTS SEG NFR BLD: 69 % (ref 32–75)
NITRITE UR QL STRIP.AUTO: NEGATIVE
NRBC # BLD: 0 K/UL (ref 0–0.01)
NRBC BLD-RTO: 0 PER 100 WBC
PH UR STRIP: 7 [PH] (ref 5–8)
PLATELET # BLD AUTO: 462 K/UL (ref 150–400)
PMV BLD AUTO: 9.5 FL (ref 8.9–12.9)
POTASSIUM SERPL-SCNC: 3.8 MMOL/L (ref 3.5–5.1)
PROT SERPL-MCNC: 5.5 G/DL (ref 6.4–8.2)
PROT UR STRIP-MCNC: 300 MG/DL
RBC # BLD AUTO: 3.73 M/UL (ref 4.1–5.7)
RBC #/AREA URNS HPF: ABNORMAL /HPF (ref 0–5)
SERVICE CMNT-IMP: ABNORMAL
SODIUM SERPL-SCNC: 129 MMOL/L (ref 136–145)
SP GR UR REFRACTOMETRY: 1.02 (ref 1–1.03)
UA: UC IF INDICATED,UAUC: ABNORMAL
UROBILINOGEN UR QL STRIP.AUTO: 0.2 EU/DL (ref 0.2–1)
WBC # BLD AUTO: 9.7 K/UL (ref 4.1–11.1)
WBC URNS QL MICRO: ABNORMAL /HPF (ref 0–4)

## 2021-07-06 PROCEDURE — 85025 COMPLETE CBC W/AUTO DIFF WBC: CPT

## 2021-07-06 PROCEDURE — 36415 COLL VENOUS BLD VENIPUNCTURE: CPT

## 2021-07-06 PROCEDURE — 83880 ASSAY OF NATRIURETIC PEPTIDE: CPT

## 2021-07-06 PROCEDURE — 81001 URINALYSIS AUTO W/SCOPE: CPT

## 2021-07-06 PROCEDURE — 99285 EMERGENCY DEPT VISIT HI MDM: CPT

## 2021-07-06 PROCEDURE — P9045 ALBUMIN (HUMAN), 5%, 250 ML: HCPCS | Performed by: STUDENT IN AN ORGANIZED HEALTH CARE EDUCATION/TRAINING PROGRAM

## 2021-07-06 PROCEDURE — 96365 THER/PROPH/DIAG IV INF INIT: CPT

## 2021-07-06 PROCEDURE — 74011636637 HC RX REV CODE- 636/637: Performed by: STUDENT IN AN ORGANIZED HEALTH CARE EDUCATION/TRAINING PROGRAM

## 2021-07-06 PROCEDURE — 96375 TX/PRO/DX INJ NEW DRUG ADDON: CPT

## 2021-07-06 PROCEDURE — 82962 GLUCOSE BLOOD TEST: CPT

## 2021-07-06 PROCEDURE — 96366 THER/PROPH/DIAG IV INF ADDON: CPT

## 2021-07-06 PROCEDURE — 80053 COMPREHEN METABOLIC PANEL: CPT

## 2021-07-06 PROCEDURE — 74011250636 HC RX REV CODE- 250/636: Performed by: STUDENT IN AN ORGANIZED HEALTH CARE EDUCATION/TRAINING PROGRAM

## 2021-07-06 RX ORDER — ALBUMIN HUMAN 50 G/1000ML
25 SOLUTION INTRAVENOUS ONCE
Status: COMPLETED | OUTPATIENT
Start: 2021-07-06 | End: 2021-07-06

## 2021-07-06 RX ADMIN — HUMAN INSULIN 5 UNITS: 100 INJECTION, SOLUTION SUBCUTANEOUS at 11:09

## 2021-07-06 RX ADMIN — ALBUMIN (HUMAN) 25 G: 12.5 INJECTION, SOLUTION INTRAVENOUS at 11:11

## 2021-07-06 RX ADMIN — SODIUM CHLORIDE 1000 ML: 9 INJECTION, SOLUTION INTRAVENOUS at 11:08
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
## 2021-07-06 NOTE — LETTER
[address]  [facility] EMERGENCY DEPT  [address] [address] [phone]    Work/School Note    Date: 7/6/2021    To Whom It May concern:    Akin Ortega was seen and treated today in the emergency room by the following provider(s):  Attending Provider: Haleigh Clay MD.      Akin Ortega may return to work on 7/7/2021.     Sincerely,          [address] [facility]

## 2021-07-14 ENCOUNTER — OFFICE VISIT (OUTPATIENT)
Dept: PRIMARY CARE CLINIC | Age: 41
End: 2021-07-14
Payer: MEDICAID

## 2021-07-14 VITALS
SYSTOLIC BLOOD PRESSURE: 144 MMHG | HEART RATE: 97 BPM | WEIGHT: 145.6 LBS | DIASTOLIC BLOOD PRESSURE: 82 MMHG | HEIGHT: 69 IN | TEMPERATURE: 97.9 F | OXYGEN SATURATION: 99 % | RESPIRATION RATE: 18 BRPM | BODY MASS INDEX: 21.56 KG/M2

## 2021-07-14 DIAGNOSIS — N13.30 HYDRONEPHROSIS OF RIGHT KIDNEY: ICD-10-CM

## 2021-07-14 DIAGNOSIS — G62.9 NEUROPATHY: ICD-10-CM

## 2021-07-14 DIAGNOSIS — I10 ESSENTIAL HYPERTENSION: ICD-10-CM

## 2021-07-14 DIAGNOSIS — D64.9 ANEMIA, UNSPECIFIED TYPE: ICD-10-CM

## 2021-07-14 DIAGNOSIS — E78.5 HYPERLIPIDEMIA, UNSPECIFIED HYPERLIPIDEMIA TYPE: ICD-10-CM

## 2021-07-14 DIAGNOSIS — R91.1 LUNG NODULE: ICD-10-CM

## 2021-07-14 DIAGNOSIS — R80.9 PROTEINURIA, UNSPECIFIED TYPE: ICD-10-CM

## 2021-07-14 DIAGNOSIS — F17.200 SMOKER: ICD-10-CM

## 2021-07-14 DIAGNOSIS — M79.89 LEG SWELLING: ICD-10-CM

## 2021-07-14 DIAGNOSIS — J44.9 CHRONIC OBSTRUCTIVE PULMONARY DISEASE, UNSPECIFIED COPD TYPE (HCC): ICD-10-CM

## 2021-07-14 DIAGNOSIS — R31.9 HEMATURIA, UNSPECIFIED TYPE: ICD-10-CM

## 2021-07-14 DIAGNOSIS — E55.9 VITAMIN D DEFICIENCY: ICD-10-CM

## 2021-07-14 DIAGNOSIS — R06.83 SNORING: ICD-10-CM

## 2021-07-14 DIAGNOSIS — E11.9 DM TYPE 2, GOAL HBA1C < 7% (HCC): Primary | ICD-10-CM

## 2021-07-14 LAB
ALBUMIN SERPL-MCNC: 1.7 G/DL (ref 3.5–5)
ALBUMIN/GLOB SERPL: ABNORMAL {RATIO} (ref 1.1–2.2)
ALP SERPL-CCNC: 203 U/L (ref 45–117)
ALT SERPL-CCNC: 31 U/L (ref 12–78)
ANION GAP SERPL CALC-SCNC: 7 MMOL/L (ref 5–15)
AST SERPL-CCNC: 31 U/L (ref 15–37)
BILIRUB SERPL-MCNC: 0.3 MG/DL (ref 0.2–1)
BUN SERPL-MCNC: 31 MG/DL (ref 6–20)
BUN/CREAT SERPL: 23 (ref 12–20)
CALCIUM SERPL-MCNC: ABNORMAL MG/DL (ref 8.5–10.1)
CHLORIDE SERPL-SCNC: 98 MMOL/L (ref 97–108)
CO2 SERPL-SCNC: 25 MMOL/L (ref 21–32)
CREAT SERPL-MCNC: 1.35 MG/DL (ref 0.7–1.3)
GLOBULIN SER CALC-MCNC: ABNORMAL G/DL (ref 2–4)
GLUCOSE SERPL-MCNC: 393 MG/DL (ref 65–100)
POTASSIUM SERPL-SCNC: 4.6 MMOL/L (ref 3.5–5.1)
PROT SERPL-MCNC: ABNORMAL G/DL (ref 6.4–8.2)
SODIUM SERPL-SCNC: 130 MMOL/L (ref 136–145)
T4 FREE SERPL-MCNC: 0.9 NG/DL (ref 0.8–1.5)
TSH SERPL DL<=0.05 MIU/L-ACNC: 4.27 UIU/ML (ref 0.36–3.74)

## 2021-07-14 PROCEDURE — 99214 OFFICE O/P EST MOD 30 MIN: CPT | Performed by: INTERNAL MEDICINE

## 2021-07-14 RX ORDER — INSULIN GLARGINE 100 [IU]/ML
INJECTION, SOLUTION SUBCUTANEOUS
Qty: 10 ADJUSTABLE DOSE PRE-FILLED PEN SYRINGE | Refills: 3 | Status: SHIPPED | OUTPATIENT
Start: 2021-07-14 | End: 2021-09-23

## 2021-07-14 RX ORDER — LISINOPRIL 20 MG/1
20 TABLET ORAL DAILY
Qty: 30 TABLET | Refills: 3 | Status: SHIPPED | OUTPATIENT
Start: 2021-07-14 | End: 2022-02-14

## 2021-07-14 RX ORDER — ROSUVASTATIN CALCIUM 40 MG/1
40 TABLET, COATED ORAL
Qty: 30 TABLET | Refills: 3 | Status: ON HOLD | OUTPATIENT
Start: 2021-07-14 | End: 2021-12-06 | Stop reason: SDUPTHER

## 2021-07-14 RX ORDER — GABAPENTIN 100 MG/1
100 CAPSULE ORAL 2 TIMES DAILY
Qty: 60 CAPSULE | Refills: 3 | Status: SHIPPED | OUTPATIENT
Start: 2021-07-14 | End: 2021-09-23

## 2021-07-14 RX ORDER — ERGOCALCIFEROL 1.25 MG/1
50000 CAPSULE ORAL
Qty: 12 CAPSULE | Refills: 0 | Status: SHIPPED | OUTPATIENT
Start: 2021-07-14 | End: 2021-09-14

## 2021-07-14 RX ORDER — FUROSEMIDE 40 MG/1
40 TABLET ORAL DAILY
Qty: 30 TABLET | Refills: 1 | Status: SHIPPED | OUTPATIENT
Start: 2021-07-14 | End: 2021-12-06

## 2021-07-14 RX ORDER — METOPROLOL TARTRATE 25 MG/1
25 TABLET, FILM COATED ORAL 2 TIMES DAILY
Qty: 60 TABLET | Refills: 1 | Status: SHIPPED | OUTPATIENT
Start: 2021-07-14 | End: 2021-12-06

## 2021-07-14 RX ORDER — POTASSIUM CHLORIDE 20 MEQ/1
20 TABLET, EXTENDED RELEASE ORAL DAILY
Qty: 30 TABLET | Refills: 3 | Status: SHIPPED | OUTPATIENT
Start: 2021-07-14 | End: 2021-11-15

## 2021-07-14 NOTE — PATIENT INSTRUCTIONS
Sliding scale for Novolog      BLood sugars                            UNits    0-100                                           0  101- 150                                      3  151-200                                       6  201-250                                       9  251-300                                      12  301-350                                      15
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
## 2021-07-14 NOTE — PROGRESS NOTES
Chief Complaint   Patient presents with   [facility] Follow Up     June 20, 202 and July 6, 2021 Alem Coleman.  Hypertension    Other     patient stated he lost weight        Visit Vitals  BP (!) 144/82 (BP 1 Location: Right upper arm, BP Patient Position: Sitting)   Pulse 97   Temp 97.9 °F (36.6 °C) (Temporal)   Resp 18   Ht 5' 9\" (1.753 m)   Wt 145 lb 9.6 oz (66 kg)   SpO2 99%   BMI 21.50 kg/m²        1. Have you been to the ER, urgent care clinic since your last visit? Hospitalized since your last visit? Yes When: June 2021 and July 6, 2021, [facility] Colmean    2. Have you seen or consulted any other health care providers outside of the [address] [place] [address] since your last visit? Include any pap smears or colon screening.  Yes [facility]

## 2021-07-15 LAB — GAD65 AB SER-ACNC: 43 U/ML (ref 0–5)

## 2021-07-22 LAB — INSULIN AB SER-ACNC: <5 UU/ML

## 2021-07-28 ENCOUNTER — TELEPHONE (OUTPATIENT)
Dept: PRIMARY CARE CLINIC | Age: 41
End: 2021-07-28

## 2021-07-28 NOTE — TELEPHONE ENCOUNTER
Patient missed appointment today. I would recommend patient to make appointment in 1 to 2 weeks to discuss lab results and follow-up on diabetes and other abnormal lab results.

## 2021-09-13 DIAGNOSIS — E55.9 VITAMIN D DEFICIENCY: ICD-10-CM

## 2021-09-14 RX ORDER — ERGOCALCIFEROL 1.25 MG/1
CAPSULE ORAL
Qty: 12 CAPSULE | Refills: 0 | Status: SHIPPED | OUTPATIENT
Start: 2021-09-14 | End: 2021-10-30

## 2021-09-21 ENCOUNTER — TRANSCRIBE ORDER (OUTPATIENT)
Dept: SCHEDULING | Age: 41
End: 2021-09-21

## 2021-09-21 DIAGNOSIS — R91.1 SOLITARY PULMONARY NODULE: Primary | ICD-10-CM

## 2021-09-23 ENCOUNTER — HOSPITAL ENCOUNTER (INPATIENT)
Age: 41
LOS: 2 days | Discharge: HOME | DRG: 720 | End: 2021-09-25
Attending: EMERGENCY MEDICINE | Admitting: HOSPITALIST
Payer: MEDICAID

## 2021-09-23 ENCOUNTER — APPOINTMENT (OUTPATIENT)
Dept: GENERAL RADIOLOGY | Age: 41
DRG: 720 | End: 2021-09-23
Attending: EMERGENCY MEDICINE
Payer: MEDICAID

## 2021-09-23 DIAGNOSIS — E10.10 TYPE 1 DIABETES MELLITUS WITH KETOACIDOSIS WITHOUT COMA (HCC): ICD-10-CM

## 2021-09-23 DIAGNOSIS — E10.10 DIABETIC KETOACIDOSIS WITHOUT COMA ASSOCIATED WITH TYPE 1 DIABETES MELLITUS (HCC): Primary | ICD-10-CM

## 2021-09-23 DIAGNOSIS — N17.9 AKI (ACUTE KIDNEY INJURY) (HCC): ICD-10-CM

## 2021-09-23 DIAGNOSIS — I10 ESSENTIAL HYPERTENSION: ICD-10-CM

## 2021-09-23 PROBLEM — E87.5 HYPERKALEMIA: Status: ACTIVE | Noted: 2021-09-23

## 2021-09-23 LAB
ADMINISTERED INITIALS, ADMINIT: NORMAL
ALBUMIN SERPL-MCNC: 2.3 G/DL (ref 3.5–5)
ALBUMIN/GLOB SERPL: 0.5 {RATIO} (ref 1.1–2.2)
ALP SERPL-CCNC: 192 U/L (ref 45–117)
ALT SERPL-CCNC: 24 U/L (ref 12–78)
ANION GAP SERPL CALC-SCNC: 14 MMOL/L (ref 5–15)
ANION GAP SERPL CALC-SCNC: 25 MMOL/L (ref 5–15)
ANION GAP SERPL CALC-SCNC: 27 MMOL/L (ref 5–15)
APPEARANCE UR: ABNORMAL
AST SERPL-CCNC: 13 U/L (ref 15–37)
ATRIAL RATE: 97 BPM
BACTERIA URNS QL MICRO: ABNORMAL /HPF
BASE DEFICIT BLDV-SCNC: 15.8 MMOL/L
BASOPHILS # BLD: 0.1 K/UL (ref 0–0.1)
BASOPHILS NFR BLD: 0 % (ref 0–1)
BILIRUB SERPL-MCNC: 0.6 MG/DL (ref 0.2–1)
BILIRUB UR QL: NEGATIVE
BUN SERPL-MCNC: 48 MG/DL (ref 6–20)
BUN SERPL-MCNC: 51 MG/DL (ref 6–20)
BUN SERPL-MCNC: 52 MG/DL (ref 6–20)
BUN/CREAT SERPL: 21 (ref 12–20)
BUN/CREAT SERPL: 21 (ref 12–20)
BUN/CREAT SERPL: 22 (ref 12–20)
CALCIUM SERPL-MCNC: 8.1 MG/DL (ref 8.5–10.1)
CALCIUM SERPL-MCNC: 8.9 MG/DL (ref 8.5–10.1)
CALCIUM SERPL-MCNC: 9.1 MG/DL (ref 8.5–10.1)
CALCULATED P AXIS, ECG09: 76 DEGREES
CALCULATED R AXIS, ECG10: 85 DEGREES
CALCULATED T AXIS, ECG11: 52 DEGREES
CHLORIDE SERPL-SCNC: 106 MMOL/L (ref 97–108)
CHLORIDE SERPL-SCNC: 92 MMOL/L (ref 97–108)
CHLORIDE SERPL-SCNC: 94 MMOL/L (ref 97–108)
CO2 SERPL-SCNC: 12 MMOL/L (ref 21–32)
CO2 SERPL-SCNC: 14 MMOL/L (ref 21–32)
CO2 SERPL-SCNC: 22 MMOL/L (ref 21–32)
COLOR UR: ABNORMAL
CREAT SERPL-MCNC: 2.29 MG/DL (ref 0.7–1.3)
CREAT SERPL-MCNC: 2.31 MG/DL (ref 0.7–1.3)
CREAT SERPL-MCNC: 2.48 MG/DL (ref 0.7–1.3)
D50 ADMINISTERED, D50ADM: 0 ML
D50 ORDER, D50ORD: 0 ML
DIAGNOSIS, 93000: NORMAL
DIFFERENTIAL METHOD BLD: ABNORMAL
EOSINOPHIL # BLD: 0 K/UL (ref 0–0.4)
EOSINOPHIL NFR BLD: 0 % (ref 0–7)
EPITH CASTS URNS QL MICRO: ABNORMAL /LPF
ERYTHROCYTE [DISTWIDTH] IN BLOOD BY AUTOMATED COUNT: 12.8 % (ref 11.5–14.5)
EST. AVERAGE GLUCOSE BLD GHB EST-MCNC: 212 MG/DL
GLOBULIN SER CALC-MCNC: 4.8 G/DL (ref 2–4)
GLSCOM COMMENTS: NORMAL
GLUCOSE BLD STRIP.AUTO-MCNC: 100 MG/DL (ref 65–117)
GLUCOSE BLD STRIP.AUTO-MCNC: 139 MG/DL (ref 65–117)
GLUCOSE BLD STRIP.AUTO-MCNC: 236 MG/DL (ref 65–117)
GLUCOSE BLD STRIP.AUTO-MCNC: 320 MG/DL (ref 65–117)
GLUCOSE BLD STRIP.AUTO-MCNC: 420 MG/DL (ref 65–117)
GLUCOSE BLD STRIP.AUTO-MCNC: 508 MG/DL (ref 65–117)
GLUCOSE BLD STRIP.AUTO-MCNC: 559 MG/DL (ref 65–117)
GLUCOSE BLD STRIP.AUTO-MCNC: 565 MG/DL (ref 65–117)
GLUCOSE BLD STRIP.AUTO-MCNC: 90 MG/DL (ref 65–117)
GLUCOSE BLD STRIP.AUTO-MCNC: >600 MG/DL (ref 65–117)
GLUCOSE BLD STRIP.AUTO-MCNC: >600 MG/DL (ref 65–117)
GLUCOSE SERPL-MCNC: 326 MG/DL (ref 65–100)
GLUCOSE SERPL-MCNC: 659 MG/DL (ref 65–100)
GLUCOSE SERPL-MCNC: 671 MG/DL (ref 65–100)
GLUCOSE UR STRIP.AUTO-MCNC: >1000 MG/DL
GLUCOSE, GLC: 100 MG/DL
GLUCOSE, GLC: 139 MG/DL
GLUCOSE, GLC: 236 MG/DL
GLUCOSE, GLC: 320 MG/DL
GLUCOSE, GLC: 420 MG/DL
GLUCOSE, GLC: 508 MG/DL
GLUCOSE, GLC: 565 MG/DL
GLUCOSE, GLC: 601 MG/DL
GLUCOSE, GLC: 601 MG/DL
GLUCOSE, GLC: 90 MG/DL
GRAN CASTS URNS QL MICRO: ABNORMAL /LPF
HBA1C MFR BLD: 9 % (ref 4–5.6)
HCO3 BLDV-SCNC: 11.2 MMOL/L (ref 23–28)
HCT VFR BLD AUTO: 33.2 % (ref 36.6–50.3)
HGB BLD-MCNC: 10.6 G/DL (ref 12.1–17)
HGB UR QL STRIP: ABNORMAL
HIGH TARGET, HITG: 250 MG/DL
HYALINE CASTS URNS QL MICRO: ABNORMAL /LPF (ref 0–5)
IMM GRANULOCYTES # BLD AUTO: 0.1 K/UL (ref 0–0.04)
IMM GRANULOCYTES NFR BLD AUTO: 1 % (ref 0–0.5)
INSULIN ADMINSTERED, INSADM: 0.8 UNITS/HOUR
INSULIN ADMINSTERED, INSADM: 1.5 UNITS/HOUR
INSULIN ADMINSTERED, INSADM: 10.6 UNITS/HOUR
INSULIN ADMINSTERED, INSADM: 10.8 UNITS/HOUR
INSULIN ADMINSTERED, INSADM: 15.6 UNITS/HOUR
INSULIN ADMINSTERED, INSADM: 16.2 UNITS/HOUR
INSULIN ADMINSTERED, INSADM: 20.2 UNITS/HOUR
INSULIN ADMINSTERED, INSADM: 21.6 UNITS/HOUR
INSULIN ADMINSTERED, INSADM: 22.4 UNITS/HOUR
INSULIN ADMINSTERED, INSADM: 3.8 UNITS/HOUR
INSULIN ORDER, INSORD: 0.8 UNITS/HOUR
INSULIN ORDER, INSORD: 1.5 UNITS/HOUR
INSULIN ORDER, INSORD: 10.6 UNITS/HOUR
INSULIN ORDER, INSORD: 10.8 UNITS/HOUR
INSULIN ORDER, INSORD: 15.6 UNITS/HOUR
INSULIN ORDER, INSORD: 16.2 UNITS/HOUR
INSULIN ORDER, INSORD: 20.2 UNITS/HOUR
INSULIN ORDER, INSORD: 21.6 UNITS/HOUR
INSULIN ORDER, INSORD: 22.4 UNITS/HOUR
INSULIN ORDER, INSORD: 3.8 UNITS/HOUR
KETONES UR QL STRIP.AUTO: 40 MG/DL
LACTATE BLD-SCNC: 2.42 MMOL/L (ref 0.4–2)
LEUKOCYTE ESTERASE UR QL STRIP.AUTO: NEGATIVE
LIPASE SERPL-CCNC: 23 U/L (ref 73–393)
LOW TARGET, LOT: 150 MG/DL
LYMPHOCYTES # BLD: 1.3 K/UL (ref 0.8–3.5)
LYMPHOCYTES NFR BLD: 9 % (ref 12–49)
MAGNESIUM SERPL-MCNC: 2.4 MG/DL (ref 1.6–2.4)
MAGNESIUM SERPL-MCNC: 2.4 MG/DL (ref 1.6–2.4)
MCH RBC QN AUTO: 30.6 PG (ref 26–34)
MCHC RBC AUTO-ENTMCNC: 31.9 G/DL (ref 30–36.5)
MCV RBC AUTO: 96 FL (ref 80–99)
MINUTES UNTIL NEXT BG, NBG: 60 MIN
MONOCYTES # BLD: 0.5 K/UL (ref 0–1)
MONOCYTES NFR BLD: 4 % (ref 5–13)
MULTIPLIER, MUL: 0.02
MULTIPLIER, MUL: 0.03
MULTIPLIER, MUL: 0.03
MULTIPLIER, MUL: 0.04
MULTIPLIER, MUL: 0.04
MULTIPLIER, MUL: 0.05
MULTIPLIER, MUL: 0.05
MULTIPLIER, MUL: 0.06
NEUTS SEG # BLD: 12 K/UL (ref 1.8–8)
NEUTS SEG NFR BLD: 86 % (ref 32–75)
NITRITE UR QL STRIP.AUTO: NEGATIVE
NRBC # BLD: 0 K/UL (ref 0–0.01)
NRBC BLD-RTO: 0 PER 100 WBC
ORDER INITIALS, ORDINIT: NORMAL
OTHER,OTHU: ABNORMAL
P-R INTERVAL, ECG05: 140 MS
PCO2 BLDV: 30.3 MMHG (ref 41–51)
PH BLDV: 7.18 [PH] (ref 7.32–7.42)
PH UR STRIP: 5.5 [PH] (ref 5–8)
PHOSPHATE SERPL-MCNC: 7.5 MG/DL (ref 2.6–4.7)
PLATELET # BLD AUTO: 400 K/UL (ref 150–400)
PMV BLD AUTO: 11.3 FL (ref 8.9–12.9)
PO2 BLDV: 41 MMHG (ref 25–40)
POTASSIUM SERPL-SCNC: 3.4 MMOL/L (ref 3.5–5.1)
POTASSIUM SERPL-SCNC: 5 MMOL/L (ref 3.5–5.1)
POTASSIUM SERPL-SCNC: 5.1 MMOL/L (ref 3.5–5.1)
PROT SERPL-MCNC: 7.1 G/DL (ref 6.4–8.2)
PROT UR STRIP-MCNC: 300 MG/DL
Q-T INTERVAL, ECG07: 372 MS
QRS DURATION, ECG06: 96 MS
QTC CALCULATION (BEZET), ECG08: 472 MS
RBC # BLD AUTO: 3.46 M/UL (ref 4.1–5.7)
RBC #/AREA URNS HPF: ABNORMAL /HPF (ref 0–5)
SAO2 % BLDV: 64.6 % (ref 65–88)
SERVICE CMNT-IMP: ABNORMAL
SERVICE CMNT-IMP: NORMAL
SERVICE CMNT-IMP: NORMAL
SODIUM SERPL-SCNC: 131 MMOL/L (ref 136–145)
SODIUM SERPL-SCNC: 133 MMOL/L (ref 136–145)
SODIUM SERPL-SCNC: 142 MMOL/L (ref 136–145)
SP GR UR REFRACTOMETRY: 1.02 (ref 1–1.03)
SPECIMEN TYPE: ABNORMAL
TROPONIN I SERPL-MCNC: <0.05 NG/ML
UA: UC IF INDICATED,UAUC: ABNORMAL
UROBILINOGEN UR QL STRIP.AUTO: 0.2 EU/DL (ref 0.2–1)
VENTRICULAR RATE, ECG03: 97 BPM
WBC # BLD AUTO: 13.9 K/UL (ref 4.1–11.1)
WBC URNS QL MICRO: ABNORMAL /HPF (ref 0–4)

## 2021-09-23 PROCEDURE — 74011636637 HC RX REV CODE- 636/637: Performed by: EMERGENCY MEDICINE

## 2021-09-23 PROCEDURE — 74011000258 HC RX REV CODE- 258: Performed by: EMERGENCY MEDICINE

## 2021-09-23 PROCEDURE — 74011250636 HC RX REV CODE- 250/636: Performed by: EMERGENCY MEDICINE

## 2021-09-23 PROCEDURE — 96361 HYDRATE IV INFUSION ADD-ON: CPT

## 2021-09-23 PROCEDURE — 99285 EMERGENCY DEPT VISIT HI MDM: CPT

## 2021-09-23 PROCEDURE — 74011250636 HC RX REV CODE- 250/636: Performed by: HOSPITALIST

## 2021-09-23 PROCEDURE — 96374 THER/PROPH/DIAG INJ IV PUSH: CPT

## 2021-09-23 PROCEDURE — 71045 X-RAY EXAM CHEST 1 VIEW: CPT

## 2021-09-23 PROCEDURE — 74011000250 HC RX REV CODE- 250: Performed by: EMERGENCY MEDICINE

## 2021-09-23 PROCEDURE — 83735 ASSAY OF MAGNESIUM: CPT

## 2021-09-23 PROCEDURE — C9113 INJ PANTOPRAZOLE SODIUM, VIA: HCPCS | Performed by: EMERGENCY MEDICINE

## 2021-09-23 PROCEDURE — C9113 INJ PANTOPRAZOLE SODIUM, VIA: HCPCS | Performed by: HOSPITALIST

## 2021-09-23 PROCEDURE — 84100 ASSAY OF PHOSPHORUS: CPT

## 2021-09-23 PROCEDURE — 80053 COMPREHEN METABOLIC PANEL: CPT

## 2021-09-23 PROCEDURE — 83036 HEMOGLOBIN GLYCOSYLATED A1C: CPT

## 2021-09-23 PROCEDURE — 93005 ELECTROCARDIOGRAM TRACING: CPT

## 2021-09-23 PROCEDURE — 87086 URINE CULTURE/COLONY COUNT: CPT

## 2021-09-23 PROCEDURE — 82962 GLUCOSE BLOOD TEST: CPT

## 2021-09-23 PROCEDURE — 84484 ASSAY OF TROPONIN QUANT: CPT

## 2021-09-23 PROCEDURE — 80048 BASIC METABOLIC PNL TOTAL CA: CPT

## 2021-09-23 PROCEDURE — 81001 URINALYSIS AUTO W/SCOPE: CPT

## 2021-09-23 PROCEDURE — 77030005513 HC CATH URETH FOL11 MDII -B

## 2021-09-23 PROCEDURE — 82803 BLOOD GASES ANY COMBINATION: CPT

## 2021-09-23 PROCEDURE — 87040 BLOOD CULTURE FOR BACTERIA: CPT

## 2021-09-23 PROCEDURE — 65660000000 HC RM CCU STEPDOWN

## 2021-09-23 PROCEDURE — 83690 ASSAY OF LIPASE: CPT

## 2021-09-23 PROCEDURE — 74011000250 HC RX REV CODE- 250: Performed by: NURSE PRACTITIONER

## 2021-09-23 PROCEDURE — 74011000258 HC RX REV CODE- 258: Performed by: HOSPITALIST

## 2021-09-23 PROCEDURE — 94761 N-INVAS EAR/PLS OXIMETRY MLT: CPT

## 2021-09-23 PROCEDURE — 36415 COLL VENOUS BLD VENIPUNCTURE: CPT

## 2021-09-23 PROCEDURE — 83605 ASSAY OF LACTIC ACID: CPT

## 2021-09-23 PROCEDURE — 85025 COMPLETE CBC W/AUTO DIFF WBC: CPT

## 2021-09-23 PROCEDURE — 74011000250 HC RX REV CODE- 250: Performed by: HOSPITALIST

## 2021-09-23 PROCEDURE — 74011250637 HC RX REV CODE- 250/637: Performed by: HOSPITALIST

## 2021-09-23 PROCEDURE — 2709999900 HC NON-CHARGEABLE SUPPLY

## 2021-09-23 RX ORDER — METOPROLOL TARTRATE 25 MG/1
25 TABLET, FILM COATED ORAL 2 TIMES DAILY
Status: DISCONTINUED | OUTPATIENT
Start: 2021-09-23 | End: 2021-09-25 | Stop reason: HOSPADM

## 2021-09-23 RX ORDER — ROSUVASTATIN CALCIUM 40 MG/1
40 TABLET, COATED ORAL
Status: DISCONTINUED | OUTPATIENT
Start: 2021-09-23 | End: 2021-09-25 | Stop reason: HOSPADM

## 2021-09-23 RX ORDER — METOCLOPRAMIDE HYDROCHLORIDE 5 MG/ML
5 INJECTION INTRAMUSCULAR; INTRAVENOUS
Status: DISCONTINUED | OUTPATIENT
Start: 2021-09-23 | End: 2021-09-25 | Stop reason: HOSPADM

## 2021-09-23 RX ORDER — SODIUM CHLORIDE 0.9 % (FLUSH) 0.9 %
5-10 SYRINGE (ML) INJECTION AS NEEDED
Status: DISCONTINUED | OUTPATIENT
Start: 2021-09-23 | End: 2021-09-25 | Stop reason: SDUPTHER

## 2021-09-23 RX ORDER — ONDANSETRON 2 MG/ML
4 INJECTION INTRAMUSCULAR; INTRAVENOUS
Status: DISCONTINUED | OUTPATIENT
Start: 2021-09-23 | End: 2021-09-25 | Stop reason: HOSPADM

## 2021-09-23 RX ORDER — POLYETHYLENE GLYCOL 3350 17 G/17G
17 POWDER, FOR SOLUTION ORAL DAILY PRN
Status: DISCONTINUED | OUTPATIENT
Start: 2021-09-23 | End: 2021-09-25 | Stop reason: HOSPADM

## 2021-09-23 RX ORDER — TAMSULOSIN HYDROCHLORIDE 0.4 MG/1
0.4 CAPSULE ORAL DAILY
Status: DISCONTINUED | OUTPATIENT
Start: 2021-09-24 | End: 2021-09-25 | Stop reason: HOSPADM

## 2021-09-23 RX ORDER — PREGABALIN 25 MG/1
75 CAPSULE ORAL 2 TIMES DAILY
Status: DISCONTINUED | OUTPATIENT
Start: 2021-09-23 | End: 2021-09-25 | Stop reason: HOSPADM

## 2021-09-23 RX ORDER — SODIUM CHLORIDE 0.9 % (FLUSH) 0.9 %
5-40 SYRINGE (ML) INJECTION AS NEEDED
Status: DISCONTINUED | OUTPATIENT
Start: 2021-09-23 | End: 2021-09-25 | Stop reason: HOSPADM

## 2021-09-23 RX ORDER — INSULIN GLARGINE 100 [IU]/ML
45 INJECTION, SOLUTION SUBCUTANEOUS
Status: ON HOLD | COMMUNITY
End: 2021-09-25 | Stop reason: SDUPTHER

## 2021-09-23 RX ORDER — ONDANSETRON 4 MG/1
4 TABLET, ORALLY DISINTEGRATING ORAL
Status: DISCONTINUED | OUTPATIENT
Start: 2021-09-23 | End: 2021-09-25 | Stop reason: HOSPADM

## 2021-09-23 RX ORDER — SODIUM CHLORIDE 9 MG/ML
150 INJECTION, SOLUTION INTRAVENOUS CONTINUOUS
Status: DISCONTINUED | OUTPATIENT
Start: 2021-09-23 | End: 2021-09-23

## 2021-09-23 RX ORDER — HEPARIN SODIUM 5000 [USP'U]/ML
5000 INJECTION, SOLUTION INTRAVENOUS; SUBCUTANEOUS EVERY 8 HOURS
Status: DISCONTINUED | OUTPATIENT
Start: 2021-09-23 | End: 2021-09-25 | Stop reason: HOSPADM

## 2021-09-23 RX ORDER — PREGABALIN 75 MG/1
75 CAPSULE ORAL 2 TIMES DAILY
COMMUNITY
End: 2022-07-14 | Stop reason: SDUPTHER

## 2021-09-23 RX ORDER — ACETAMINOPHEN 650 MG/1
650 SUPPOSITORY RECTAL
Status: DISCONTINUED | OUTPATIENT
Start: 2021-09-23 | End: 2021-09-25 | Stop reason: HOSPADM

## 2021-09-23 RX ORDER — SODIUM CHLORIDE 0.9 % (FLUSH) 0.9 %
5-40 SYRINGE (ML) INJECTION EVERY 8 HOURS
Status: DISCONTINUED | OUTPATIENT
Start: 2021-09-23 | End: 2021-09-25 | Stop reason: HOSPADM

## 2021-09-23 RX ORDER — POTASSIUM CHLORIDE 750 MG/1
40 TABLET, FILM COATED, EXTENDED RELEASE ORAL
Status: COMPLETED | OUTPATIENT
Start: 2021-09-23 | End: 2021-09-23

## 2021-09-23 RX ORDER — MAGNESIUM SULFATE 100 %
4 CRYSTALS MISCELLANEOUS AS NEEDED
Status: DISCONTINUED | OUTPATIENT
Start: 2021-09-23 | End: 2021-09-24

## 2021-09-23 RX ORDER — METOCLOPRAMIDE HYDROCHLORIDE 5 MG/ML
10 INJECTION INTRAMUSCULAR; INTRAVENOUS
Status: COMPLETED | OUTPATIENT
Start: 2021-09-23 | End: 2021-09-23

## 2021-09-23 RX ORDER — DEXTROSE 50 % IN WATER (D50W) INTRAVENOUS SYRINGE
25-50 AS NEEDED
Status: DISCONTINUED | OUTPATIENT
Start: 2021-09-23 | End: 2021-09-24

## 2021-09-23 RX ORDER — DEXTROSE MONOHYDRATE AND SODIUM CHLORIDE 5; .45 G/100ML; G/100ML
150 INJECTION, SOLUTION INTRAVENOUS CONTINUOUS
Status: DISCONTINUED | OUTPATIENT
Start: 2021-09-23 | End: 2021-09-24

## 2021-09-23 RX ORDER — ACETAMINOPHEN 325 MG/1
650 TABLET ORAL
Status: DISCONTINUED | OUTPATIENT
Start: 2021-09-23 | End: 2021-09-25 | Stop reason: HOSPADM

## 2021-09-23 RX ADMIN — SODIUM CHLORIDE 40 MG: 9 INJECTION, SOLUTION INTRAMUSCULAR; INTRAVENOUS; SUBCUTANEOUS at 21:34

## 2021-09-23 RX ADMIN — METOCLOPRAMIDE 10 MG: 5 INJECTION, SOLUTION INTRAMUSCULAR; INTRAVENOUS at 10:00

## 2021-09-23 RX ADMIN — SODIUM CHLORIDE 1000 ML: 9 INJECTION, SOLUTION INTRAVENOUS at 11:44

## 2021-09-23 RX ADMIN — SODIUM CHLORIDE 1000 ML: 900 INJECTION, SOLUTION INTRAVENOUS at 17:28

## 2021-09-23 RX ADMIN — CEFTRIAXONE 1 G: 1 INJECTION, POWDER, FOR SOLUTION INTRAMUSCULAR; INTRAVENOUS at 15:03

## 2021-09-23 RX ADMIN — ONDANSETRON 4 MG: 2 INJECTION INTRAMUSCULAR; INTRAVENOUS at 13:00

## 2021-09-23 RX ADMIN — METOPROLOL TARTRATE 25 MG: 25 TABLET ORAL at 18:07

## 2021-09-23 RX ADMIN — Medication 10 ML: at 21:38

## 2021-09-23 RX ADMIN — SODIUM CHLORIDE 16.2 UNITS/HR: 9 INJECTION, SOLUTION INTRAVENOUS at 12:56

## 2021-09-23 RX ADMIN — SODIUM CHLORIDE 40 MG: 9 INJECTION, SOLUTION INTRAMUSCULAR; INTRAVENOUS; SUBCUTANEOUS at 09:44

## 2021-09-23 RX ADMIN — POTASSIUM CHLORIDE 40 MEQ: 750 TABLET, FILM COATED, EXTENDED RELEASE ORAL at 21:31

## 2021-09-23 RX ADMIN — SODIUM CHLORIDE 20.2 UNITS/HR: 9 INJECTION, SOLUTION INTRAVENOUS at 14:06

## 2021-09-23 RX ADMIN — Medication 10 ML: at 15:18

## 2021-09-23 RX ADMIN — ROSUVASTATIN CALCIUM 40 MG: 40 TABLET, FILM COATED ORAL at 21:32

## 2021-09-23 RX ADMIN — HEPARIN SODIUM 5000 UNITS: 5000 INJECTION INTRAVENOUS; SUBCUTANEOUS at 15:03

## 2021-09-23 RX ADMIN — DEXTROSE AND SODIUM CHLORIDE 150 ML/HR: 5; 450 INJECTION, SOLUTION INTRAVENOUS at 22:35

## 2021-09-23 RX ADMIN — HEPARIN SODIUM 5000 UNITS: 5000 INJECTION INTRAVENOUS; SUBCUTANEOUS at 21:34

## 2021-09-23 RX ADMIN — SODIUM CHLORIDE 1000 ML: 9 INJECTION, SOLUTION INTRAVENOUS at 09:43

## 2021-09-23 RX ADMIN — PREGABALIN 75 MG: 25 CAPSULE ORAL at 18:07

## 2021-09-23 RX ADMIN — SODIUM CHLORIDE 10.8 UNITS/HR: 9 INJECTION, SOLUTION INTRAVENOUS at 11:45

## 2021-09-23 RX ADMIN — SODIUM CHLORIDE 1000 ML: 900 INJECTION, SOLUTION INTRAVENOUS at 18:57

## 2021-09-23 RX ADMIN — METOCLOPRAMIDE 5 MG: 5 INJECTION, SOLUTION INTRAMUSCULAR; INTRAVENOUS at 15:17

## 2021-09-23 RX ADMIN — SODIUM CHLORIDE 15.6 UNITS/HR: 9 INJECTION, SOLUTION INTRAVENOUS at 18:13

## 2021-09-23 RX ADMIN — SODIUM CHLORIDE 150 ML/HR: 9 INJECTION, SOLUTION INTRAVENOUS at 15:17

## 2021-09-23 NOTE — REMOTE MONITORING
Spoke with primary RN Oliver Cantu regarding 3 and 6 hour Sepsis bundle.     Elizabeth Rojas, RN, BSN  Sepsis [facility]  4

## 2021-09-23 NOTE — PROGRESS NOTES
Notified by RN of lactic 2.42. Spoke to Steve Kern on PCU who just received patient onto unit. Looks like he had 1L bolus in ER and then now on maintenance NS 150ml/hr. He has put out 1.2L after insertion of hansen. Initiate severe sepsis bundle set:  Ordered blood cultures  Fluid bolus 2L NS over 2 hrs and then resume maintenance NS 150ml/hr  Re-draw lactic in 4 hours but making sure that fluid bolus has been completed prior to repeat lactic.     Raquel John MD

## 2021-09-23 NOTE — PROGRESS NOTES
Acute urinary retention, POA  --informed by ER nurse bladder scan shows 999ml and patient still not able to avoid. --order given for insert hansen given nemesio, uti.     Tahira Zelaya MD

## 2021-09-23 NOTE — PROGRESS NOTES
TRANSFER - IN REPORT:    Verbal report received from [facility]  on Elkin Almaguert  being received from ER (unit) for routine progression of care      Report consisted of patients Situation, Background, Assessment and   Recommendations(SBAR). Information from the following report(s) SBAR, Kardex, ED Summary, Intake/Output, MAR, Recent Results and Cardiac Rhythm . was reviewed with the receiving nurse. Opportunity for questions and clarification was provided. Assessment completed upon patients arrival to unit and care assumed.

## 2021-09-23 NOTE — ED NOTES
Patient is being transferred to [facility], Room # [phone]. Report given to Kathleen Urban RN on [facility] for routine progression of care. Report consisted of the following information SBAR, Kardex, ED Summary, Procedure Summary, Intake/Output, MAR, Accordion, Recent Results, Med Rec Status and Cardiac Rhythm Stach. Patient transferred to receiving unit by: Sherie Pressley (RN or tech name). Outstanding consults needed: No     Next labs due: Yes    The following personal items will be sent with the patient during transfer to the floor:     All valuables:    Cardiac monitoring ordered: Yes    The following CURRENT information was reported to the receiving RN:    Code status: Full Code at time of transfer    Last set of vital signs:  Vital Signs  Level of Consciousness: Alert (0) (09/23/21 1500)  Temp: 98 °F (36.7 °C) (09/23/21 1500)  Temp Source: Oral (09/23/21 1500)  Pulse (Heart Rate): (!) 101 (09/23/21 1500)  Cardiac Rhythm: Sinus Tachy (09/23/21 1001)  Resp Rate: 27 (09/23/21 1500)  BP: (!) 120/52 (09/23/21 1500)  MAP (Monitor): 67 (09/23/21 1500)  MAP (Calculated): 75 (09/23/21 1500)  BP 1 Method: Automatic (09/23/21 0854)  MEWS Score: 3 (09/23/21 1500)         Oxygen Therapy  O2 Sat (%): 98 % (09/23/21 1500)  Pulse via Oximetry: 100 beats per minute (09/23/21 1500)  O2 Device: None (Room air) (09/23/21 1200)      Last pain assessment:  Pain 1  Pain Scale 1: Numeric (0 - 10)  Pain Intensity 1: 8      Wounds: No     Urinary catheter: hansen  Is there a hansen order: Yes    LDAs:       Peripheral IV 09/23/21 Left Hand (Active)   Site Assessment Clean, dry, & intact 09/23/21 0856   Phlebitis Assessment 0 09/23/21 0856   Infiltration Assessment 0 09/23/21 0856   Dressing Status Clean, dry, & intact 09/23/21 0856   Dressing Type Transparent 09/23/21 0856       Peripheral IV 09/23/21 Distal;Right;Upper Basilic (Active)       Peripheral IV 09/23/21 Left Wrist (Active)   Site Assessment Clean, dry, & intact 09/23/21 1314   Phlebitis Assessment 0 09/23/21 1314   Infiltration Assessment 0 09/23/21 1314   Dressing Status Clean, dry, & intact 09/23/21 1314   Dressing Type Transparent 09/23/21 1314         Opportunity for questions and clarification was provided.     Roxanna Lemons RN

## 2021-09-23 NOTE — PROGRESS NOTES
End of Shift Note    Bedside shift change report given to [address]  (oncoming nurse) by Jolly Friedman RN (offgoing nurse). Report included the following information SBAR, Kardex, ED Summary, Procedure Summary, Intake/Output, MAR, Recent Results and Cardiac Rhythm . Shift worked:  Day      Shift summary and any significant changes:     Pt on insulin drip Q1 BG checks, started sepsis bundle, blood cultures sent, started fluid bolus X2 and gave report on repeat lactic. Dr aware of pts condition see notes     Concerns for physician to address: none     Zone phone for oncoming shift:         Activity:  Activity Level: Up with Assistance, Bath Room Privileges  Number times ambulated in hallways past shift: 0  Number of times OOB to chair past shift: 0    Cardiac:   Cardiac Monitoring: Yes      Cardiac Rhythm: Sinus Tachy    Access:   Current line(s): PIV     Genitourinary:   Urinary status: hansen    Respiratory:   O2 Device: None (Room air)  Chronic home O2 use?: NO  Incentive spirometer at bedside: NO     GI:  Last Bowel Movement Date: 09/23/21  Current diet:  DIET NPO Sips of Water with Meds, Ice Chips  Passing flatus: YES  Tolerating current diet: NO       Pain Management:   Patient states pain is manageable on current regimen: YES    Skin:  Corey Score: 21  Interventions: speciality bed, float heels, PT/OT consult and internal/external urinary devices    Patient Safety:  Fall Score:  Total Score: 0  Interventions: bed/chair alarm, assistive device (walker, cane, etc), gripper socks and pt to call before getting OOB       Length of Stay:  Expected LOS: - - -  Actual LOS: 0      Jolly Friedman RN

## 2021-09-23 NOTE — ED PROVIDER NOTES
EMERGENCY DEPARTMENT HISTORY AND PHYSICAL EXAM      Date: 9/23/2021  Patient Name: Rm Mackay    History of Presenting Illness     Chief Complaint   Patient presents with    Abdominal Pain    Vomiting     nausea vomiting hi reading for blood sugar at home    High Blood Sugar       History Provided By: Patient    HPI: Rm Mackay, 47 y.o. male with PMHx significant for diabetes, bipolar disorder, who presents with a chief complaint of elevated blood sugar, nausea, and vomiting patient reports that his monitor has been reading high at home. He began having some nausea and vomiting around 2 AM.  Also reports some midsternal, mild, chest discomfort that started after he was vomiting and is worse with vomiting. He tells me he has been out of his antiemetic for the last week and was unable to get his doctor refill it. Per EMS, his blood sugar was 386. He received 4 of Zofran and 350 of normal saline prior to arrival and states he is overall feeling improved. Complains primarily of feeling thirsty      PCP: Nicko Mcgee MD    There are no other complaints, changes, or physical findings at this time.     Current Facility-Administered Medications   Medication Dose Route Frequency Provider Last Rate Last Admin    insulin regular (NOVOLIN R, HUMULIN R) 100 Units in 0.9% sodium chloride 100 mL infusion  0-50 Units/hr IntraVENous TITRATE Reinaldo Young MD 16.2 mL/hr at 09/23/21 1256 16.2 Units/hr at 09/23/21 1256    glucose chewable tablet 16 g  4 Tablet Oral PRN Reinaldo Young MD        dextrose (D50W) injection syrg 12.5-25 g  25-50 mL IntraVENous PRN Reinaldo Young MD        glucagon (GLUCAGEN) injection 1 mg  1 mg IntraMUSCular PRN Reinaldo Young MD        sodium chloride (NS) flush 5-40 mL  5-40 mL IntraVENous Q8H Nasir Simmons MD        sodium chloride (NS) flush 5-40 mL  5-40 mL IntraVENous PRN Nasir Simmons MD        acetaminophen (TYLENOL) tablet 650 mg  650 mg Oral Q6H PRN Karen Shannon MD        Or    acetaminophen (TYLENOL) suppository 650 mg  650 mg Rectal Q6H PRN Karen Shannon MD        polyethylene glycol [facility]) packet 17 g  17 g Oral DAILY PRN Karen Shannon MD        ondansetron (ZOFRAN ODT) tablet 4 mg  4 mg Oral Q8H PRN Karen Shannon MD        Or    ondansetron [facility]) injection 4 mg  4 mg IntraVENous Q6H PRN Karen Shannon MD   4 mg at 09/23/21 1300    0.9% sodium chloride infusion  150 mL/hr IntraVENous CONTINUOUS Karen Shannon MD        heparin (porcine) injection 5,000 Units  5,000 Units SubCUTAneous Q8H Karen Shannon MD        metoprolol tartrate (LOPRESSOR) tablet 25 mg  25 mg Oral BID Karen Shannon MD        pregabalin (LYRICA) capsule 75 mg  75 mg Oral BID Karen Shannon MD        rosuvastatin (CRESTOR) tablet 40 mg  40 mg Oral QHS Karen Shannon MD        [START ON 9/24/2021] tamsulosin (FLOMAX) capsule 0.4 mg  0.4 mg Oral DAILY Karen Shannon MD        pantoprazole (PROTONIX) 40 mg in 0.9% sodium chloride 10 mL injection  40 mg IntraVENous Q12H Karen Shannon MD        metoclopramide HCl (REGLAN) injection 5 mg  5 mg IntraVENous Q6H PRN Karen Shannon MD         Current Outpatient Medications   Medication Sig Dispense Refill    pregabalin (Lyrica) 75 mg capsule Take 75 mg by mouth two (2) times a day.  ergocalciferol (ERGOCALCIFEROL) 1,250 mcg (50,000 unit) capsule Take 1 capsule by mouth once a week 12 Capsule 0    lisinopriL (PRINIVIL, ZESTRIL) 20 mg tablet Take 1 Tablet by mouth daily. 30 Tablet 3    insulin glargine (Lantus Solostar U-100 Insulin) 100 unit/mL (3 mL) inpn adminster 40 units subcut daily (Patient taking differently: 35 Units by SubCUTAneous route daily.  adminster 40 units subcut daily) 10 Adjustable Dose Pre-filled Pen Syringe 3    insulin aspart U-100 (NovoLOG Flexpen U-100 Insulin) 100 unit/mL (3 mL) inpn (Novolog or Humalog, whichever more preferred: Inject 5 units with each meal + correction insulin, up to 30 units per day 10 Adjustable Dose Pre-filled Pen Syringe 3    rosuvastatin (CRESTOR) 40 mg tablet Take 1 Tablet by mouth nightly. 30 Tablet 3    potassium chloride (K-DUR, KLOR-CON) 20 mEq tablet Take 1 Tablet by mouth daily. 30 Tablet 3    metoprolol tartrate (LOPRESSOR) 25 mg tablet Take 1 Tablet by mouth two (2) times a day. 60 Tablet 1    furosemide (Lasix) 40 mg tablet Take 1 Tablet by mouth daily. 30 Tablet 1    methocarbamoL (Robaxin) 500 mg tablet Take 1 Tablet by mouth four (4) times daily. 20 Tablet 0    naproxen (NAPROSYN) 500 mg tablet Take 1 Tablet by mouth every twelve (12) hours as needed for Pain. 20 Tablet 0    sildenafil citrate (VIAGRA) 50 mg tablet TAKE 1 TABLET BY MOUTH AS NEEDED 10 Tab 0    flash glucose sensor (FreeStyle Connor 14 Day Sensor) kit 6 (14 day) sensors for use with Freestyle Scanner 6 Kit 3    pantoprazole (PROTONIX) 40 mg tablet Take 1 Tab by mouth two (2) times a day. 60 Tab 0    tamsulosin (FLOMAX) 0.4 mg capsule Take 1 Cap by mouth daily.  30 Cap 1     Past History     Past Medical History:  Past Medical History:   Diagnosis Date    Bipolar 1 disorder, depressed ([address]     Bipolar disorder ([address]     Depression     Diabetes ([address]     DKA, type 1 ([facility] [address] 1/27/2013    diagnosed age 23    H/O noncompliance with medical treatment, presenting hazards to health     MRSA (methicillin resistant staph aureus) culture positive     MRSA (methicillin resistant Staphylococcus aureus)     Face    Noncompliance with medication regimen     Second hand smoke exposure     Seizure ([address]     Seizures ([address] 2006 or 2007    one episode during prison     Past Surgical History:  Past Surgical History:   Procedure Laterality Date    HX HEENT      top left wisdom tooth    HX ORTHOPAEDIC Left     wrist; MCV    UPPER GI ENDOSCOPY,BIOPSY  11/20/2018          Family History:  Family History   Problem Relation Age of Onset    Diabetes Mother     Diabetes Other         neice, type 1      Social History:  Social History     Tobacco Use    Smoking status: Former Smoker     Packs/day: 0.10     Years: 16.00     Pack years: 1.60     Types: Cigarettes     Quit date: 2020     Years since quittin.5    Smokeless tobacco: Never Used   Substance Use Topics    Alcohol use: No    Drug use: No     Allergies:  No Known Allergies  Review of Systems   Review of Systems   Constitutional: Negative for chills and fever. HENT: Negative for congestion, rhinorrhea and sore throat. Respiratory: Negative for cough and shortness of breath. Cardiovascular: Positive for chest pain. Gastrointestinal: Positive for nausea and vomiting. Negative for abdominal pain. Genitourinary: Negative for dysuria and urgency. Skin: Negative for rash. Neurological: Negative for dizziness, light-headedness and headaches. All other systems reviewed and are negative. Physical Exam   Physical Exam  Vitals and nursing note reviewed. Constitutional:       General: He is not in acute distress. Appearance: He is well-developed. HENT:      Head: Normocephalic and atraumatic. Mouth/Throat:      Mouth: Mucous membranes are dry. Eyes:      Conjunctiva/sclera: Conjunctivae normal.      Pupils: Pupils are equal, round, and reactive to light. Cardiovascular:      Rate and Rhythm: Normal rate and regular rhythm. Pulmonary:      Effort: Pulmonary effort is normal. No respiratory distress. Breath sounds: Normal breath sounds. No stridor. Abdominal:      General: There is no distension. Palpations: Abdomen is soft. Tenderness: There is no abdominal tenderness. Musculoskeletal:         General: Normal range of motion. Cervical back: Normal range of motion. Skin:     General: Skin is warm and dry. Neurological:      Mental Status: He is alert and oriented to person, place, and time.        Diagnostic Study Results   Labs -     Recent Results (from the past 12 hour(s))   EKG, 12 LEAD, INITIAL    Collection Time: 09/23/21  9:02 AM   Result Value Ref Range    Ventricular Rate 97 BPM    Atrial Rate 97 BPM    P-R Interval 140 ms    QRS Duration 96 ms    Q-T Interval 372 ms    QTC Calculation (Bezet) 472 ms    Calculated P Axis 76 degrees    Calculated R Axis 85 degrees    Calculated T Axis 52 degrees    Diagnosis       Normal sinus rhythm  Normal ECG  When compared with ECG of 20-JUN-2021 13:27,  No significant change was found     GLUCOSE, POC    Collection Time: 09/23/21  9:18 AM   Result Value Ref Range    Glucose (POC) 559 (H) 65 - 117 mg/dL    Performed by Rosalinda Lauren \"Fabio\"    CBC WITH AUTOMATED DIFF    Collection Time: 09/23/21  9:30 AM   Result Value Ref Range    WBC 13.9 (H) 4.1 - 11.1 K/uL    RBC 3.46 (L) 4.10 - 5.70 M/uL    HGB 10.6 (L) 12.1 - 17.0 g/dL    HCT 33.2 (L) 36.6 - 50.3 %    MCV 96.0 80.0 - 99.0 FL    MCH 30.6 26.0 - 34.0 PG    MCHC 31.9 30.0 - 36.5 g/dL    RDW 12.8 11.5 - 14.5 %    PLATELET 246 231 - 857 K/uL    MPV 11.3 8.9 - 12.9 FL    NRBC 0.0 0  WBC    ABSOLUTE NRBC 0.00 0.00 - 0.01 K/uL    NEUTROPHILS 86 (H) 32 - 75 %    LYMPHOCYTES 9 (L) 12 - 49 %    MONOCYTES 4 (L) 5 - 13 %    EOSINOPHILS 0 0 - 7 %    BASOPHILS 0 0 - 1 %    IMMATURE GRANULOCYTES 1 (H) 0.0 - 0.5 %    ABS. NEUTROPHILS 12.0 (H) 1.8 - 8.0 K/UL    ABS. LYMPHOCYTES 1.3 0.8 - 3.5 K/UL    ABS. MONOCYTES 0.5 0.0 - 1.0 K/UL    ABS. EOSINOPHILS 0.0 0.0 - 0.4 K/UL    ABS. BASOPHILS 0.1 0.0 - 0.1 K/UL    ABS. IMM.  GRANS. 0.1 (H) 0.00 - 0.04 K/UL    DF AUTOMATED     METABOLIC PANEL, COMPREHENSIVE    Collection Time: 09/23/21  9:30 AM   Result Value Ref Range    Sodium 133 (L) 136 - 145 mmol/L    Potassium 5.1 3.5 - 5.1 mmol/L    Chloride 92 (L) 97 - 108 mmol/L    CO2 14 (LL) 21 - 32 mmol/L    Anion gap 27 (H) 5 - 15 mmol/L    Glucose 659 (HH) 65 - 100 mg/dL    BUN 48 (H) 6 - 20 MG/DL    Creatinine 2.29 (H) 0.70 - 1.30 MG/DL    BUN/Creatinine ratio 21 (H) 12 - 20      GFR est AA 39 (L) >60 ml/min/1.73m2    GFR est non-AA 32 (L) >60 ml/min/1.73m2    Calcium 9.1 8.5 - 10.1 MG/DL    Bilirubin, total 0.6 0.2 - 1.0 MG/DL    ALT (SGPT) 24 12 - 78 U/L    AST (SGOT) 13 (L) 15 - 37 U/L    Alk.  phosphatase 192 (H) 45 - 117 U/L    Protein, total 7.1 6.4 - 8.2 g/dL    Albumin 2.3 (L) 3.5 - 5.0 g/dL    Globulin 4.8 (H) 2.0 - 4.0 g/dL    A-G Ratio 0.5 (L) 1.1 - 2.2     TROPONIN I    Collection Time: 09/23/21  9:30 AM   Result Value Ref Range    Troponin-I, Qt. <0.05 <0.05 ng/mL   URINALYSIS W/ REFLEX CULTURE    Collection Time: 09/23/21 10:46 AM    Specimen: Urine   Result Value Ref Range    Color YELLOW/STRAW      Appearance CLOUDY (A) CLEAR      Specific gravity 1.022 1.003 - 1.030      pH (UA) 5.5 5.0 - 8.0      Protein 300 (A) NEG mg/dL    Glucose >1,000 (A) NEG mg/dL    Ketone 40 (A) NEG mg/dL    Bilirubin Negative NEG      Blood SMALL (A) NEG      Urobilinogen 0.2 0.2 - 1.0 EU/dL    Nitrites Negative NEG      Leukocyte Esterase Negative NEG      WBC 10-20 0 - 4 /hpf    RBC 5-10 0 - 5 /hpf    Epithelial cells FEW FEW /lpf    Bacteria 1+ (A) NEG /hpf    UA:UC IF INDICATED URINE CULTURE ORDERED (A) CNI      Hyaline cast 2-5 0 - 5 /lpf    Granular cast 0-2 (A) NEG /lpf    Other: Renal Epithelial cells Present     METABOLIC PANEL, BASIC    Collection Time: 09/23/21 11:11 AM   Result Value Ref Range    Sodium 131 (L) 136 - 145 mmol/L    Potassium 5.0 3.5 - 5.1 mmol/L    Chloride 94 (L) 97 - 108 mmol/L    CO2 12 (LL) 21 - 32 mmol/L    Anion gap 25 (H) 5 - 15 mmol/L    Glucose 671 (HH) 65 - 100 mg/dL    BUN 51 (H) 6 - 20 MG/DL    Creatinine 2.31 (H) 0.70 - 1.30 MG/DL    BUN/Creatinine ratio 22 (H) 12 - 20      GFR est AA 39 (L) >60 ml/min/1.73m2    GFR est non-AA 32 (L) >60 ml/min/1.73m2    Calcium 8.9 8.5 - 10.1 MG/DL   MAGNESIUM    Collection Time: 09/23/21 11:11 AM   Result Value Ref Range    Magnesium 2.4 1.6 - 2.4 mg/dL   PHOSPHORUS    Collection Time: 09/23/21 11:11 AM   Result Value Ref Range Phosphorus 7.5 (H) 2.6 - 4.7 MG/DL   POC VENOUS BLOOD GAS    Collection Time: 09/23/21 11:16 AM   Result Value Ref Range    pH, venous (POC) 7.18 (LL) 7.32 - 7.42      pCO2, venous (POC) 30.3 (L) 41 - 51 MMHG    pO2, venous (POC) 41 (H) 25 - 40 mmHg    HCO3, venous (POC) 11.2 (L) 23.0 - 28.0 MMOL/L    sO2, venous (POC) 64.6 (L) 65 - 88 %    Base deficit, venous (POC) 15.8 mmol/L    Specimen type (POC) VENOUS BLOOD      Performed by [address] value read back BART    GLUCOSE, POC    Collection Time: 09/23/21 11:50 AM   Result Value Ref Range    Glucose (POC) >600 (HH) 65 - 117 mg/dL    Performed by Jus Hernandez \"Maryann\"    GLUCOSTABILIZER    Collection Time: 09/23/21 11:55 AM   Result Value Ref Range    Glucose 601 mg/dL    Insulin order 10.8 units/hour    Insulin adminstered 10.8 units/hour    Multiplier 0.020     Low target 150 mg/dL    High target 250 mg/dL    D50 order 0.0 ml    D50 administered 0.00 ml    Minutes until next BG 60 min    Order initials [email or web address]     Administered initials miriam SOLIS Comments     GLUCOSE, POC    Collection Time: 09/23/21 12:53 PM   Result Value Ref Range    Glucose (POC) >600 (HH) 65 - 117 mg/dL    Performed by Jus Hernandez \"Maryann\"    GLUCOSTABILIZER    Collection Time: 09/23/21 12:55 PM   Result Value Ref Range    Glucose 601 mg/dL    Insulin order 16.2 units/hour    Insulin adminstered 16.2 units/hour    Multiplier 0.030     Low target 150 mg/dL    High target 250 mg/dL    D50 order 0.0 ml    D50 administered 0.00 ml    Minutes until next BG 60 min    Order initials jea     Administered initials miriam     GLSCJESSICA Comments         Radiologic Studies -   XR CHEST PORT   Final Result   No evidence of acute cardiopulmonary process. XR CHEST PORT    Result Date: 9/23/2021  No evidence of acute cardiopulmonary process. Medical Decision Making   I am the first provider for this patient.     I reviewed the vital signs, available nursing notes, past medical history, past surgical history, family history and social history. Vital Signs-Reviewed the patient's vital signs. Patient Vitals for the past 12 hrs:   Temp Pulse Resp BP SpO2   09/23/21 1300 -- 98 20 114/80 100 %   09/23/21 1200 -- 99 25 (!) 125/56 100 %   09/23/21 1155 -- 99 24 (!) 125/59 100 %   09/23/21 1012 -- (!) 101 29 (!) 151/66 97 %   09/23/21 0945 -- (!) 102 22 136/77 100 %   09/23/21 0854 97.8 °F (36.6 °C) 98 16 129/60 98 %       Pulse Oximetry Analysis - 98% on ra    Cardiac Monitor:   Rate: 98 bpm  Rhythm: Normal Sinus Rhythm      ED EKG interpretation:  Rhythm: normal sinus rhythm; and regular . Rate (approx.): 97; Axis: normal; P wave: normal; QRS interval: normal ; ST/T wave: normal; Other findings: normal. This EKG was interpreted by KEKE Wen MD,ED Provider. Records Reviewed: Nursing Notes and Old Medical Records    Provider Notes (Medical Decision Making):   Patient presents with nausea, vomiting, and elevated blood sugar. On presentation he has dry mucous membranes and is mildly tachycardic. Point-of-care sugar on arrival in the 500s. Concern for DKA with gastroparesis contributing to his symptoms. Will check basic lab work, urinalysis, give IV fluids, reevaluate. ED Course:   Initial assessment performed. The patients presenting problems have been discussed, and they are in agreement with the care plan formulated and outlined with them. I have encouraged them to ask questions as they arise throughout their visit. Lab work notable for a sugar greater than 600 with a bicarb of 14 and a gap of 27. FELECIA to 2.2. Labs consistent with DKA. VBG was obtained and shows acidosis to 7. 18. Patient started on insulin drip. Discussed with Dr. Luke Burgos, hospitalist, for admission.          Procedures:  Procedures    Critical Care:  CRITICAL CARE NOTE :  IMPENDING DETERIORATION -Metabolic and Renal  ASSOCIATED RISK FACTORS - Hypotension, Metabolic changes and Dehydration  MANAGEMENT- Bedside Assessment  INTERPRETATION -  Blood Gases and Blood Pressure  INTERVENTIONS - hemodynamic mngmt and Metobolic interventions  CASE REVIEW - Hospitalist/Intensivist  TREATMENT RESPONSE -Improved  PERFORMED BY - Self    NOTES   :    I have spent 40 minutes of critical care time involved in lab review, consultations with specialist, family decision- making, bedside attention and documentation. During this entire length of time I was immediately available to the patient . Kaleigh Lopes MD      Disposition:    Admission Note:  Patient is being admitted to the hospital by Dr. Boy Kyle, Service: Hospitalist.  The results of their tests and reasons for their admission have been discussed with them and available family. They convey agreement and understanding for the need to be admitted and for their admission diagnosis. Diagnosis     Clinical Impression:   1. Diabetic ketoacidosis without coma associated with type 1 diabetes mellitus ([facility] [address]    2. FELECIA (acute kidney injury) [facility])            Please note that this dictation was completed with IMedExchange, the computer voice recognition software. Quite often unanticipated grammatical, syntax, homophones, and other interpretive errors are inadvertently transcribed by the computer software. Please disregard these errors.   Please excuse any errors that have escaped final proofreading

## 2021-09-23 NOTE — PROGRESS NOTES
Pharmacy Clarification of Prior to Admission Medication Regimen     The patient was  interviewed regarding clarification of the prior to admission medication regimen. Patient was questioned regarding use of any other inhalers, topical products, over the counter medications, herbal medications, vitamin products or ophthalmic/nasal/otic medication use. Patient could not remember any of the names of the medications when questioned, only that they sounded familiar so I followed up with his pharmacy. Information Obtained From: Patient, Patient Pharmacy    Pertinent Pharmacy Findings:  Updated patients preferred outpatient pharmacy to: [facility]    Were changes made to high alert medication on PTA list? No      PTA medication list was corrected to the following:     Prior to Admission Medications   Prescriptions Last Dose Informant Taking?   ergocalciferol (ERGOCALCIFEROL) 1,250 mcg (50,000 unit) capsule 2021 at Unknown time Self Yes   Sig: Take 1 capsule by mouth once a week   furosemide (Lasix) 40 mg tablet  Other Yes   Sig: Take 1 Tablet by mouth daily. insulin aspart U-100 (NovoLOG Flexpen U-100 Insulin) 100 unit/mL (3 mL) inpn 2021 at Unknown time Self Yes   Sig: (Novolog or Humalog, whichever more preferred: Inject 5 units with each meal + correction insulin, up to 30 units per day   insulin glargine (Lantus U-100 Insulin) 100 unit/mL injection  Self Yes   Si Units by SubCUTAneous route nightly. lisinopriL (PRINIVIL, ZESTRIL) 20 mg tablet 2021 at Unknown time Other Yes   Sig: Take 1 Tablet by mouth daily. metoprolol tartrate (LOPRESSOR) 25 mg tablet  Other Yes   Sig: Take 1 Tablet by mouth two (2) times a day. naproxen (NAPROSYN) 500 mg tablet  Other Yes   Sig: Take 1 Tablet by mouth every twelve (12) hours as needed for Pain.   pantoprazole (PROTONIX) 40 mg tablet  Other Yes   Sig: Take 1 Tab by mouth two (2) times a day.    potassium chloride (K-DUR, KLOR-CON) 20 mEq tablet  Other Yes   Sig: Take 1 Tablet by mouth daily. pregabalin (Lyrica) 75 mg capsule  Other Yes   Sig: Take 75 mg by mouth two (2) times a day. rosuvastatin (CRESTOR) 40 mg tablet  Other Yes   Sig: Take 1 Tablet by mouth nightly. sildenafil citrate (VIAGRA) 50 mg tablet  Other Yes   Sig: TAKE 1 TABLET BY MOUTH AS NEEDED   tamsulosin (FLOMAX) 0.4 mg capsule  Other Yes   Sig: Take 1 Cap by mouth daily.       Facility-Administered Medications: None        Thank you,  Maximus Santoro [facility]  Medication History Pharmacy Technician

## 2021-09-23 NOTE — ED NOTES
Pt second IV by ultrasound infiltrated; the iv for insulin and fluids is in the left hand now.  ED charge Wilman Friedman will try for a line

## 2021-09-23 NOTE — H&P
Hospitalist Admission Note    NAME: Kailey Wilhelm   :  1980   MRN:  [id number]     Date/Time:  2021 11:35 AM    Patient PCP: Houston Fraga MD  Endocrinologist:  Dr. Cy Yeung    Please note that this dictation was completed with Kasenna, the computer voice recognition software. Quite often unanticipated grammatical, syntax, homophones, and other interpretive errors are inadvertently transcribed by the computer software. Please disregard these errors. Please excuse any errors that have escaped final proofreading  ______________________________________________________________________   Assessment & Plan:  DKA in DM type 1, A1c 13.2 in   --insulin drip, IVF  --check A1c  --consult DM management. Outpatient f/u with endocrinologist  --BMP, mag, phos q4h  --no clear infection. UA pending    SIRS due to DKA with leukocytosis WBC 13, HR >100  --likely reactive to DKA. --await ua, cxr negative. FELECIA Cr 2.2, baseline 0.9  --hold lasix, lisinopril  --IVF  --monitor for urinary retention. Check postvoid pressure    DM neuropathy  --continue lyrica    Hyperlipidemia  --continue statin    HTN  --continue metoprolol. Hold lasix, lisinopril    GERD  --change PPI to IV    BPH  --continue tamsulosin. Check bladder scan for urinary retention    Body mass index is 23.11 kg/m². Code:  full  DVT prophylaxis: SQ heparin  Surrogate decision maker: Mother Solomon Barrios [phone]          Subjective:   CHIEF COMPLAINT:  High BS, n/v at 2am    HISTORY OF PRESENT ILLNESS:     Kailey Wilhelm is a 47 y.o. male with PMH DM type 1 dx , HTN, BPH, bipolar disorder presents with high blood sugars and nausea, vomiting at 2am.  Sharp midsternal chest pain after he had been vomiting. Denies any bloody emesis. Pain worse with vomiting. No fever, chills, SOB, abdominal pain, diarrhea. He last took lantus and humalog yesterday. He is not sure what triggered DKA this time.     We were asked to admit for work up and evaluation of the above problems. Past Medical History:   Diagnosis Date    [facility]     [facility] disorder ([facility]     Depression     Diabetes ([facility]     DKA, type 1 ([facility] 2013    diagnosed age 23    H/O noncompliance with medical treatment, presenting hazards to health     MRSA (methicillin resistant staph aureus) culture positive     MRSA (methicillin resistant Staphylococcus aureus)     Face    Noncompliance with medication regimen     Second hand smoke exposure     [facility]     Seizures ([facility] 2006 or 2007    one episode during halfway      Past Surgical History:   Procedure Laterality Date    HX HEENT      top left wisdom tooth    HX ORTHOPAEDIC Left     wrist; [facility]    UPPER GI ENDOSCOPY,BIOPSY  2018          Social History     Tobacco Use    Smoking status: Former Smoker     Packs/day: 0.10     Years: 16.00     Pack years: 1.60     Types: Cigarettes     Quit date: 2020     Years since quittin.5    Smokeless tobacco: Never Used   Substance Use Topics    Alcohol use: No      Drug use:  denies      Family History   Problem Relation Age of Onset    Diabetes Mother     Diabetes Other         neice, type 1       No Known Allergies     Prior to Admission medications    Medication Sig Start Date End Date Taking? Authorizing Provider   pregabalin (Lyrica) 75 mg capsule Take 75 mg by mouth two (2) times a day. Yes Provider, Historical   insulin glargine (Lantus U-100 Insulin) 100 unit/mL injection 45 Units by SubCUTAneous route nightly. Yes Provider, Historical   ergocalciferol (ERGOCALCIFEROL) 1,250 mcg (50,000 unit) capsule Take 1 capsule by mouth once a week 21  Yes Darrion Lee MD   rosuvastatin (CRESTOR) 40 mg tablet Take 1 Tablet by mouth nightly. 21  Yes Darrion Lee MD   potassium chloride (K-DUR, KLOR-CON) 20 mEq tablet Take 1 Tablet by mouth daily.  21  Yes Darrion Lee MD metoprolol tartrate (LOPRESSOR) 25 mg tablet Take 1 Tablet by mouth two (2) times a day. 7/14/21  Yes Howard Navarrete MD   lisinopriL (PRINIVIL, ZESTRIL) 20 mg tablet Take 1 Tablet by mouth daily. 7/14/21  Yes Howard Navarrete MD   furosemide (Lasix) 40 mg tablet Take 1 Tablet by mouth daily. 7/14/21  Yes Howard Navarrete MD   naproxen (NAPROSYN) 500 mg tablet Take 1 Tablet by mouth every twelve (12) hours as needed for Pain. 6/20/21  Yes Justin Oswald MD   insulin aspart U-100 (NovoLOG Flexpen U-100 Insulin) 100 unit/mL (3 mL) inpn (Novolog or Humalog, whichever more preferred: Inject 5 units with each meal + correction insulin, up to 30 units per day 4/1/21  Yes Howard Navarrete MD   sildenafil citrate (VIAGRA) 50 mg tablet TAKE 1 TABLET BY MOUTH AS NEEDED 1/11/21  Yes Howard Navarrete MD   pantoprazole (PROTONIX) 40 mg tablet Take 1 Tab by mouth two (2) times a day. 1/27/20  Yes Karen Mason MD   tamsulosin (FLOMAX) 0.4 mg capsule Take 1 Cap by mouth daily. 1/6/20  Yes Toney Cain MD     REVIEW OF SYSTEMS:  POSITIVE= Bold.   Negative = normal text  General:  fever, chills, sweats, generalized weakness, weight loss/gain, loss of appetite  Eyes:  blurred vision, eye pain, loss of vision, diplopia  Ear Nose and Throat:  rhinorrhea, pharyngitis  Respiratory:   cough, sputum production, SOB, wheezing, [place], pleuritic pain  Cardiology:  chest pain, palpitations, orthopnea, PND, edema, syncope   Gastrointestinal:  abdominal pain, N/V, dysphagia, diarrhea, constipation, bleeding  Genitourinary:  frequency, urgency, dysuria, hematuria, incontinence  Muskuloskeletal :  arthralgia, myalgia  Hematology:  easy bruising, bleeding, lymphadenopathy  Dermatological:  rash, ulceration, pruritis  Endocrine:  hot flashes or polydipsia, polyuria  Neurological:  headache, dizziness, confusion, focal weakness, paresthesia, memory loss, gait disturbance  Psychological: anxiety, depression, agitation      Objective:   VITALS:    Visit Vitals  /60   Pulse 98   Temp 97.8 °F (36.6 °C)   Resp 16   Ht 5' 8\" (1.727 m)   Wt 68.9 kg (152 lb)   SpO2 98%   BMI 23.11 kg/m²     Temp (24hrs), Av.8 °F (36.6 °C), Min:97.8 °F (36.6 °C), Max:97.8 °F (36.6 °C)    Body mass index is 23.11 kg/m². PHYSICAL EXAM:    General:    Alert, cooperative, no distress, appears stated age. HEENT: Atraumatic, anicteric sclerae, pink conjunctivae     No oral ulcers, mucosa dry, throat clear. Hearing intact. Neck:  Supple, symmetrical,  thyroid: non tender  Lungs:   Clear to auscultation bilaterally. No Wheezing or Rhonchi. No rales. Chest wall:  No tenderness  No Accessory muscle use. Piercing of   Heart:   Regular  rhythm,  No  murmur   No gallop. No edema. Abdomen:   Soft, epigastric tenderness, fullness in lower abdominal with mild pain, no guarding/rebound. Bowel sounds normal. No masses  Extremities: No cyanosis. No clubbing  Skin:     Not pale Not Jaundiced  No rashes   Several tattoos on right flank, chest  Psych:  Fair insight. Not depressed. Anxious, restless  Neurologic: EOMs intact. No facial asymmetry. No aphasia or slurred speech. Symmetrical strength, Alert and oriented X 3.   No tremor   Peripheral pulse: Right, Radial, 2+  Capillary refill:  normal    IMAGING RESULTS:   []       I have personally reviewed the actual   []     CXR  []     CT scan  CXR:  CT :  EKG:  NSR 97, normal intervals, no ischemia   ________________________________________________________________________  Care Plan discussed with:    Comments   Patient y    Family  y Left message with mother   RN     Care Manager                    Consultant:      ________________________________________________________________________  Prophylaxis:  GI PPI   DVT heparin   ________________________________________________________________________  Recommended Disposition:   Home with Family y   HH/PT/OT/RN    SNF/LTC    CATHIE ________________________________________________________________________  Code Status:  Full Code y   DNR/DNI    ________________________________________________________________________  TOTAL TIME:  45 minutes      ______________________________________________________________________  Emily Dias MD      Procedures: see electronic medical records for all procedures/Xrays and details which were not copied into this note but were reviewed prior to creation of Plan. LAB DATA REVIEWED:    Recent Results (from the past 24 hour(s))   EKG, 12 LEAD, INITIAL    Collection Time: 09/23/21  9:02 AM   Result Value Ref Range    Ventricular Rate 97 BPM    Atrial Rate 97 BPM    P-R Interval 140 ms    QRS Duration 96 ms    Q-T Interval 372 ms    QTC Calculation (Bezet) 472 ms    Calculated P Axis 76 degrees    Calculated R Axis 85 degrees    Calculated T Axis 52 degrees    Diagnosis       Normal sinus rhythm  Normal ECG  When compared with ECG of 20-JUN-2021 13:27,  No significant change was found     GLUCOSE, POC    Collection Time: 09/23/21  9:18 AM   Result Value Ref Range    Glucose (POC) 559 (H) 65 - 117 mg/dL    Performed by Rosalinda Lauren \"Fabio\"    CBC WITH AUTOMATED DIFF    Collection Time: 09/23/21  9:30 AM   Result Value Ref Range    WBC 13.9 (H) 4.1 - 11.1 K/uL    RBC 3.46 (L) 4.10 - 5.70 M/uL    HGB 10.6 (L) 12.1 - 17.0 g/dL    HCT 33.2 (L) 36.6 - 50.3 %    MCV 96.0 80.0 - 99.0 FL    MCH 30.6 26.0 - 34.0 PG    MCHC 31.9 30.0 - 36.5 g/dL    RDW 12.8 11.5 - 14.5 %    PLATELET 503 021 - 535 K/uL    MPV 11.3 8.9 - 12.9 FL    NRBC 0.0 0  WBC    ABSOLUTE NRBC 0.00 0.00 - 0.01 K/uL    NEUTROPHILS 86 (H) 32 - 75 %    LYMPHOCYTES 9 (L) 12 - 49 %    MONOCYTES 4 (L) 5 - 13 %    EOSINOPHILS 0 0 - 7 %    BASOPHILS 0 0 - 1 %    IMMATURE GRANULOCYTES 1 (H) 0.0 - 0.5 %    ABS. NEUTROPHILS 12.0 (H) 1.8 - 8.0 K/UL    ABS. LYMPHOCYTES 1.3 0.8 - 3.5 K/UL    ABS. MONOCYTES 0.5 0.0 - 1.0 K/UL    ABS.  EOSINOPHILS 0.0 0.0 - 0.4 K/UL    ABS. BASOPHILS 0.1 0.0 - 0.1 K/UL    ABS. IMM. GRANS. 0.1 (H) 0.00 - 0.04 K/UL    DF AUTOMATED     METABOLIC PANEL, COMPREHENSIVE    Collection Time: 09/23/21  9:30 AM   Result Value Ref Range    Sodium 133 (L) 136 - 145 mmol/L    Potassium 5.1 3.5 - 5.1 mmol/L    Chloride 92 (L) 97 - 108 mmol/L    CO2 14 (LL) 21 - 32 mmol/L    Anion gap 27 (H) 5 - 15 mmol/L    Glucose 659 (HH) 65 - 100 mg/dL    BUN 48 (H) 6 - 20 MG/DL    Creatinine 2.29 (H) 0.70 - 1.30 MG/DL    BUN/Creatinine ratio 21 (H) 12 - 20      GFR est AA 39 (L) >60 ml/min/1.73m2    GFR est non-AA 32 (L) >60 ml/min/1.73m2    Calcium 9.1 8.5 - 10.1 MG/DL    Bilirubin, total 0.6 0.2 - 1.0 MG/DL    ALT (SGPT) 24 12 - 78 U/L    AST (SGOT) 13 (L) 15 - 37 U/L    Alk.  phosphatase 192 (H) 45 - 117 U/L    Protein, total 7.1 6.4 - 8.2 g/dL    Albumin 2.3 (L) 3.5 - 5.0 g/dL    Globulin 4.8 (H) 2.0 - 4.0 g/dL    A-G Ratio 0.5 (L) 1.1 - 2.2     TROPONIN I    Collection Time: 09/23/21  9:30 AM   Result Value Ref Range    Troponin-I, Qt. <0.05 <0.05 ng/mL

## 2021-09-24 ENCOUNTER — TELEPHONE (OUTPATIENT)
Dept: PRIMARY CARE CLINIC | Age: 41
End: 2021-09-24

## 2021-09-24 LAB
ADMINISTERED INITIALS, ADMINIT: NORMAL
ANION GAP SERPL CALC-SCNC: 11 MMOL/L (ref 5–15)
BACTERIA SPEC CULT: NORMAL
BASOPHILS # BLD: 0 K/UL (ref 0–0.1)
BASOPHILS NFR BLD: 0 % (ref 0–1)
BUN SERPL-MCNC: 41 MG/DL (ref 6–20)
BUN/CREAT SERPL: 20 (ref 12–20)
CALCIUM SERPL-MCNC: 7.5 MG/DL (ref 8.5–10.1)
CHLORIDE SERPL-SCNC: 105 MMOL/L (ref 97–108)
CO2 SERPL-SCNC: 21 MMOL/L (ref 21–32)
CREAT SERPL-MCNC: 2.03 MG/DL (ref 0.7–1.3)
D50 ADMINISTERED, D50ADM: 0 ML
D50 ORDER, D50ORD: 0 ML
DIFFERENTIAL METHOD BLD: ABNORMAL
EOSINOPHIL # BLD: 0.2 K/UL (ref 0–0.4)
EOSINOPHIL NFR BLD: 1 % (ref 0–7)
ERYTHROCYTE [DISTWIDTH] IN BLOOD BY AUTOMATED COUNT: 12.8 % (ref 11.5–14.5)
GLSCOM COMMENTS: NORMAL
GLUCOSE BLD STRIP.AUTO-MCNC: 113 MG/DL (ref 65–117)
GLUCOSE BLD STRIP.AUTO-MCNC: 117 MG/DL (ref 65–117)
GLUCOSE BLD STRIP.AUTO-MCNC: 118 MG/DL (ref 65–117)
GLUCOSE BLD STRIP.AUTO-MCNC: 132 MG/DL (ref 65–117)
GLUCOSE BLD STRIP.AUTO-MCNC: 158 MG/DL (ref 65–117)
GLUCOSE BLD STRIP.AUTO-MCNC: 164 MG/DL (ref 65–117)
GLUCOSE BLD STRIP.AUTO-MCNC: 185 MG/DL (ref 65–117)
GLUCOSE BLD STRIP.AUTO-MCNC: 194 MG/DL (ref 65–117)
GLUCOSE BLD STRIP.AUTO-MCNC: 205 MG/DL (ref 65–117)
GLUCOSE BLD STRIP.AUTO-MCNC: 206 MG/DL (ref 65–117)
GLUCOSE BLD STRIP.AUTO-MCNC: 221 MG/DL (ref 65–117)
GLUCOSE BLD STRIP.AUTO-MCNC: 246 MG/DL (ref 65–117)
GLUCOSE BLD STRIP.AUTO-MCNC: 259 MG/DL (ref 65–117)
GLUCOSE BLD STRIP.AUTO-MCNC: 261 MG/DL (ref 65–117)
GLUCOSE SERPL-MCNC: 250 MG/DL (ref 65–100)
GLUCOSE, GLC: 113 MG/DL
GLUCOSE, GLC: 117 MG/DL
GLUCOSE, GLC: 132 MG/DL
GLUCOSE, GLC: 164 MG/DL
GLUCOSE, GLC: 194 MG/DL
GLUCOSE, GLC: 205 MG/DL
GLUCOSE, GLC: 206 MG/DL
GLUCOSE, GLC: 221 MG/DL
GLUCOSE, GLC: 246 MG/DL
GLUCOSE, GLC: 259 MG/DL
GLUCOSE, GLC: 261 MG/DL
HCT VFR BLD AUTO: 26.1 % (ref 36.6–50.3)
HGB BLD-MCNC: 8.8 G/DL (ref 12.1–17)
HIGH TARGET, HITG: 250 MG/DL
IMM GRANULOCYTES # BLD AUTO: 0.1 K/UL (ref 0–0.04)
IMM GRANULOCYTES NFR BLD AUTO: 1 % (ref 0–0.5)
INSULIN ADMINSTERED, INSADM: 0 UNITS/HOUR
INSULIN ADMINSTERED, INSADM: 0.1 UNITS/HOUR
INSULIN ADMINSTERED, INSADM: 0.5 UNITS/HOUR
INSULIN ADMINSTERED, INSADM: 1 UNITS/HOUR
INSULIN ADMINSTERED, INSADM: 2 UNITS/HOUR
INSULIN ADMINSTERED, INSADM: 2.7 UNITS/HOUR
INSULIN ADMINSTERED, INSADM: 2.9 UNITS/HOUR
INSULIN ADMINSTERED, INSADM: 2.9 UNITS/HOUR
INSULIN ADMINSTERED, INSADM: 3.2 UNITS/HOUR
INSULIN ADMINSTERED, INSADM: 3.7 UNITS/HOUR
INSULIN ADMINSTERED, INSADM: 4 UNITS/HOUR
INSULIN ORDER, INSORD: 0 UNITS/HOUR
INSULIN ORDER, INSORD: 0.1 UNITS/HOUR
INSULIN ORDER, INSORD: 0.5 UNITS/HOUR
INSULIN ORDER, INSORD: 1 UNITS/HOUR
INSULIN ORDER, INSORD: 2 UNITS/HOUR
INSULIN ORDER, INSORD: 2.7 UNITS/HOUR
INSULIN ORDER, INSORD: 2.9 UNITS/HOUR
INSULIN ORDER, INSORD: 2.9 UNITS/HOUR
INSULIN ORDER, INSORD: 3.2 UNITS/HOUR
INSULIN ORDER, INSORD: 3.7 UNITS/HOUR
INSULIN ORDER, INSORD: 4 UNITS/HOUR
LACTATE SERPL-SCNC: 1 MMOL/L (ref 0.4–2)
LOW TARGET, LOT: 150 MG/DL
LYMPHOCYTES # BLD: 2.3 K/UL (ref 0.8–3.5)
LYMPHOCYTES NFR BLD: 15 % (ref 12–49)
MAGNESIUM SERPL-MCNC: 2 MG/DL (ref 1.6–2.4)
MCH RBC QN AUTO: 31.3 PG (ref 26–34)
MCHC RBC AUTO-ENTMCNC: 33.7 G/DL (ref 30–36.5)
MCV RBC AUTO: 92.9 FL (ref 80–99)
MINUTES UNTIL NEXT BG, NBG: 120 MIN
MINUTES UNTIL NEXT BG, NBG: 60 MIN
MONOCYTES # BLD: 1.6 K/UL (ref 0–1)
MONOCYTES NFR BLD: 10 % (ref 5–13)
MULTIPLIER, MUL: 0
MULTIPLIER, MUL: 0
MULTIPLIER, MUL: 0.01
MULTIPLIER, MUL: 0.01
MULTIPLIER, MUL: 0.02
NEUTS SEG # BLD: 10.9 K/UL (ref 1.8–8)
NEUTS SEG NFR BLD: 73 % (ref 32–75)
NRBC # BLD: 0 K/UL (ref 0–0.01)
NRBC BLD-RTO: 0 PER 100 WBC
ORDER INITIALS, ORDINIT: NORMAL
PLATELET # BLD AUTO: 369 K/UL (ref 150–400)
PMV BLD AUTO: 10.8 FL (ref 8.9–12.9)
POTASSIUM SERPL-SCNC: 4.1 MMOL/L (ref 3.5–5.1)
RBC # BLD AUTO: 2.81 M/UL (ref 4.1–5.7)
SERVICE CMNT-IMP: ABNORMAL
SERVICE CMNT-IMP: NORMAL
SODIUM SERPL-SCNC: 137 MMOL/L (ref 136–145)
WBC # BLD AUTO: 15 K/UL (ref 4.1–11.1)

## 2021-09-24 PROCEDURE — 74011000258 HC RX REV CODE- 258: Performed by: EMERGENCY MEDICINE

## 2021-09-24 PROCEDURE — 74011000250 HC RX REV CODE- 250: Performed by: HOSPITALIST

## 2021-09-24 PROCEDURE — 74011636637 HC RX REV CODE- 636/637: Performed by: GENERAL ACUTE CARE HOSPITAL

## 2021-09-24 PROCEDURE — 99233 SBSQ HOSP IP/OBS HIGH 50: CPT | Performed by: CLINICAL NURSE SPECIALIST

## 2021-09-24 PROCEDURE — 74011000258 HC RX REV CODE- 258: Performed by: HOSPITALIST

## 2021-09-24 PROCEDURE — 65660000000 HC RM CCU STEPDOWN

## 2021-09-24 PROCEDURE — 36415 COLL VENOUS BLD VENIPUNCTURE: CPT

## 2021-09-24 PROCEDURE — 82962 GLUCOSE BLOOD TEST: CPT

## 2021-09-24 PROCEDURE — 85025 COMPLETE CBC W/AUTO DIFF WBC: CPT

## 2021-09-24 PROCEDURE — 77010033678 HC OXYGEN DAILY

## 2021-09-24 PROCEDURE — 99356 PR PROLONGED SVC I/P OR OBS SETTING 1ST HOUR: CPT | Performed by: CLINICAL NURSE SPECIALIST

## 2021-09-24 PROCEDURE — 74011636637 HC RX REV CODE- 636/637: Performed by: EMERGENCY MEDICINE

## 2021-09-24 PROCEDURE — 74011250637 HC RX REV CODE- 250/637: Performed by: HOSPITALIST

## 2021-09-24 PROCEDURE — 83735 ASSAY OF MAGNESIUM: CPT

## 2021-09-24 PROCEDURE — 74011250636 HC RX REV CODE- 250/636: Performed by: HOSPITALIST

## 2021-09-24 PROCEDURE — C9113 INJ PANTOPRAZOLE SODIUM, VIA: HCPCS | Performed by: HOSPITALIST

## 2021-09-24 PROCEDURE — 80048 BASIC METABOLIC PNL TOTAL CA: CPT

## 2021-09-24 PROCEDURE — 83605 ASSAY OF LACTIC ACID: CPT

## 2021-09-24 RX ORDER — DEXTROSE 50 % IN WATER (D50W) INTRAVENOUS SYRINGE
12.5-25 AS NEEDED
Status: DISCONTINUED | OUTPATIENT
Start: 2021-09-24 | End: 2021-09-25 | Stop reason: HOSPADM

## 2021-09-24 RX ORDER — INSULIN LISPRO 100 [IU]/ML
INJECTION, SOLUTION INTRAVENOUS; SUBCUTANEOUS
Status: DISCONTINUED | OUTPATIENT
Start: 2021-09-24 | End: 2021-09-25 | Stop reason: HOSPADM

## 2021-09-24 RX ORDER — MAGNESIUM SULFATE 100 %
4 CRYSTALS MISCELLANEOUS AS NEEDED
Status: DISCONTINUED | OUTPATIENT
Start: 2021-09-24 | End: 2021-09-25 | Stop reason: HOSPADM

## 2021-09-24 RX ORDER — INSULIN LISPRO 100 [IU]/ML
3 INJECTION, SOLUTION INTRAVENOUS; SUBCUTANEOUS
Status: DISCONTINUED | OUTPATIENT
Start: 2021-09-24 | End: 2021-09-25 | Stop reason: HOSPADM

## 2021-09-24 RX ORDER — INSULIN GLARGINE 100 [IU]/ML
22 INJECTION, SOLUTION SUBCUTANEOUS DAILY
Status: DISCONTINUED | OUTPATIENT
Start: 2021-09-24 | End: 2021-09-25 | Stop reason: HOSPADM

## 2021-09-24 RX ADMIN — HEPARIN SODIUM 5000 UNITS: 5000 INJECTION INTRAVENOUS; SUBCUTANEOUS at 05:34

## 2021-09-24 RX ADMIN — HEPARIN SODIUM 5000 UNITS: 5000 INJECTION INTRAVENOUS; SUBCUTANEOUS at 21:34

## 2021-09-24 RX ADMIN — METOPROLOL TARTRATE 25 MG: 25 TABLET ORAL at 08:40

## 2021-09-24 RX ADMIN — PREGABALIN 75 MG: 25 CAPSULE ORAL at 08:39

## 2021-09-24 RX ADMIN — SODIUM CHLORIDE 1 UNITS/HR: 9 INJECTION, SOLUTION INTRAVENOUS at 00:10

## 2021-09-24 RX ADMIN — Medication 10 ML: at 13:23

## 2021-09-24 RX ADMIN — INSULIN LISPRO 3 UNITS: 100 INJECTION, SOLUTION INTRAVENOUS; SUBCUTANEOUS at 17:09

## 2021-09-24 RX ADMIN — PREGABALIN 75 MG: 25 CAPSULE ORAL at 17:09

## 2021-09-24 RX ADMIN — SODIUM CHLORIDE 0.1 UNITS/HR: 9 INJECTION, SOLUTION INTRAVENOUS at 03:05

## 2021-09-24 RX ADMIN — INSULIN LISPRO 3 UNITS: 100 INJECTION, SOLUTION INTRAVENOUS; SUBCUTANEOUS at 11:30

## 2021-09-24 RX ADMIN — SODIUM CHLORIDE 2 UNITS/HR: 9 INJECTION, SOLUTION INTRAVENOUS at 04:27

## 2021-09-24 RX ADMIN — METOPROLOL TARTRATE 25 MG: 25 TABLET ORAL at 17:09

## 2021-09-24 RX ADMIN — SODIUM CHLORIDE 0.5 UNITS/HR: 9 INJECTION, SOLUTION INTRAVENOUS at 01:12

## 2021-09-24 RX ADMIN — INSULIN GLARGINE 22 UNITS: 100 INJECTION, SOLUTION SUBCUTANEOUS at 11:31

## 2021-09-24 RX ADMIN — ACETAMINOPHEN 650 MG: 325 TABLET ORAL at 19:46

## 2021-09-24 RX ADMIN — HEPARIN SODIUM 5000 UNITS: 5000 INJECTION INTRAVENOUS; SUBCUTANEOUS at 13:23

## 2021-09-24 RX ADMIN — Medication 10 ML: at 05:35

## 2021-09-24 RX ADMIN — ROSUVASTATIN CALCIUM 40 MG: 40 TABLET, FILM COATED ORAL at 21:33

## 2021-09-24 RX ADMIN — SODIUM CHLORIDE 40 MG: 9 INJECTION, SOLUTION INTRAMUSCULAR; INTRAVENOUS; SUBCUTANEOUS at 08:40

## 2021-09-24 RX ADMIN — SODIUM CHLORIDE 40 MG: 9 INJECTION, SOLUTION INTRAMUSCULAR; INTRAVENOUS; SUBCUTANEOUS at 21:33

## 2021-09-24 RX ADMIN — CEFTRIAXONE 1 G: 1 INJECTION, POWDER, FOR SOLUTION INTRAMUSCULAR; INTRAVENOUS at 14:31

## 2021-09-24 RX ADMIN — Medication 10 ML: at 21:35

## 2021-09-24 RX ADMIN — TAMSULOSIN HYDROCHLORIDE 0.4 MG: 0.4 CAPSULE ORAL at 08:40

## 2021-09-24 NOTE — DIABETES MGMT
[address] [facility] [place] NURSE SPECIALIST CONSULT     Initial Presentation   Sanya Rosas is a 47 y.o. male who presented to the ED 23 elevated blood sugar, nausea, and vomiting. Also reports some midsternal, mild, chest discomfort that started after he was vomiting and is worse with vomiting. On EMS arrival, BG was 386 and given zofran and 1L NS. Initial labs significant for: , CO2 14, AG 27, Creat 2.29, GFR 32, A1C 9%. WBC 13.9.  UA: 1000+ glucosuria, moderate ketones, leuk esterase negative. VB./.3/41    HX:   Past Medical History:   Diagnosis Date    Bipolar 1 disorder, depressed ([address]     Bipolar disorder ([address]     Depression     Diabetes ([address]     DKA, type 1 ([address] 2013    diagnosed age 23    H/O noncompliance with medical treatment, presenting hazards to health     MRSA (methicillin resistant staph aureus) culture positive     MRSA (methicillin resistant Staphylococcus aureus)     Face    Noncompliance with medication regimen     Second hand smoke exposure     Seizure ([address]     Seizures ([address] 2006 or 2007    one episode during assisted        INITIAL DX: Moderate DKA, FELECIA    Current Treatment     TX: IV fluids, Insulin gtt, septic work-up    Consulted by Alcon Cormier MD for advanced diabetes nursing assessment and care for:   [x] Inpatient management strategy    Hospital Course   Clinical progress has been uncomplicated.      Diabetes History   Diagnosed with Type 1 Diabetes at age 27- 25 years ago  Did see a provider at [facility] [address]- will not be going back as he is no longer living in that area    Diabetes-related Medical History  Acute complications  DKA  Neurological complications  Impotence and Peripheral neuropathy  Microvascular disease  Retinopathy and Nephropathy  Other associated conditions     Depression    Diabetes Medication History  Drug class Currently in use Discontinued Never used   Biguanide      DDP-4 inhibitor       Sulfonylurea      Thiazolidinedione      GLP-1 RA      SGLT-2 inhibitors      Basal insulin Lantus 45 units daily     Bolus insulin Inject 5 units with each meal + correction insulin     Fixed Dose  Combinations        Subjective   I am feeling a whole lot better than I did yesterday. 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 and is having vision loss preventing him to work  Lives with family locally  Hx drug use    Patient reports the following home diabetes self-management practices:   Eating pattern   [x] Breakfast Oatmeal or cereal  [x] Lunch  [employer]  [x] [employer] or chicken \"or whatever is in the fridge\"  [x] Bedtime None  [x] Snacks Chips or granola bar  [x] Beverages Regular pepsi (2-3/day) and water  Physical activity pattern   [x] Limited  Monitoring pattern   [x] Testing BGs sufficiently to inform self-management adjustments  Uses a freestyle shailesh CGM  \"I try to keep my sugar under 200\"  No lows    Taking medications pattern  [x] In-consistent administration: Injecting Lantus 5-6 times weekly, Novolog- maybe misses 4-5 injections a week  [x] Affordable    Social determinants of health impacting diabetes self-management practices   Worried that housing situation is unstable, Missing health appointments or obtaining medications due to lack of reliable transportation, Struggling with anxiety and/or depression and Concerned that you need to know more about how to stay healthy with diabetes       A1C 9%  Last set of labs: , AG 11, GFR 37  Blood and urine cx sent, on IV antibiotics  Objective   Physical exam  General Normal weight male in no acute distress/ill-appearing.  Conversant and cooperative  Neuro  Alert, oriented   Vital Signs   Visit Vitals  /76 (BP 1 Location: Left upper arm, BP Patient Position: Supine)   Pulse 88   Temp 98.5 °F (36.9 °C)   Resp 11   Ht 5' 8\" (1.727 m)   Wt 68.9 kg (152 lb)   SpO2 94%   BMI 23.11 kg/m²     Skin  Warm and dry. No acanthosis noted along neckline. Lipohypertrophy at injection sites- upper arms. Poor dentition- multiple missing teeth, mouth sores, cracked teeth  Heart   Regular rate and rhythm. No murmurs, rubs or gallops  Lungs  Clear to auscultation without rales or rhonchi  Extremities No foot wounds      Laboratory      CBC WITH AUTOMATED DIFF    Collection Time: 09/24/21  3:09 AM   Result Value Ref Range    WBC 15.0 (H) 4.1 - 11.1 K/uL    RBC 2.81 (L) 4.10 - 5.70 M/uL    HGB 8.8 (L) 12.1 - 17.0 g/dL    HCT 26.1 (L) 36.6 - 50.3 %    MCV 92.9 80.0 - 99.0 FL    MCH 31.3 26.0 - 34.0 PG    MCHC 33.7 30.0 - 36.5 g/dL    RDW 12.8 11.5 - 14.5 %    PLATELET 099 753 - 876 K/uL    MPV 10.8 8.9 - 12.9 FL    NRBC 0.0 0  WBC    ABSOLUTE NRBC 0.00 0.00 - 0.01 K/uL    NEUTROPHILS 73 32 - 75 %    LYMPHOCYTES 15 12 - 49 %    MONOCYTES 10 5 - 13 %    EOSINOPHILS 1 0 - 7 %    BASOPHILS 0 0 - 1 %    IMMATURE GRANULOCYTES 1 (H) 0.0 - 0.5 %    ABS. NEUTROPHILS 10.9 (H) 1.8 - 8.0 K/UL    ABS. LYMPHOCYTES 2.3 0.8 - 3.5 K/UL    ABS. MONOCYTES 1.6 (H) 0.0 - 1.0 K/UL    ABS. EOSINOPHILS 0.2 0.0 - 0.4 K/UL    ABS. BASOPHILS 0.0 0.0 - 0.1 K/UL    ABS. IMM. GRANS. 0.1 (H) 0.00 - 0.04 K/UL    DF AUTOMATED       No results found for this visit on 09/23/21 (from the past 6 hour(s)).   BMP:   Lab Results   Component Value Date/Time     09/24/2021 03:09 AM    K 4.1 09/24/2021 03:09 AM     09/24/2021 03:09 AM    CO2 21 09/24/2021 03:09 AM    AGAP 11 09/24/2021 03:09 AM     (H) 09/24/2021 03:09 AM    BUN 41 (H) 09/24/2021 03:09 AM    CREA 2.03 (H) 09/24/2021 03:09 AM    GFRAA 45 (L) 09/24/2021 03:09 AM    GFRNA 37 (L) 09/24/2021 03:09 AM      Blood glucose pattern        Assessment and Plan   Nursing Diagnosis Risk for unstable blood glucose pattern   Nursing Intervention Domain 9380 Decision-making Support   Nursing Interventions Examined current inpatient diabetes control   Explored factors facilitating and impeding inpatient management  Identified self-management practices impeding diabetes control  Explored corrective strategies with patient and responsible inpatient provider   Informed patient of rational for insulin strategy while hospitalized  Instructed patient in: long term complications of diabetes, goal A1C     Evaluation   Shama Blas is 47year old gentleman, with uncontrolled Type 1 diabetes with microvascular complications of diabetic retinopathy, nephropathy, peripheral neuropathy, and ED who presented to the ED with a 1 day hx of elevated blood glucose values and N/V. Initial labs were significant for , CO2 14, AG 27, Creat 2.29, GFR 32, A1C 9%. WBC 13.9.  UA: 1000+ glucosuria, moderate ketones, leuk esterase negative. VB.18/30.3/41 consistent with moderate DKA. He was fluid resuscitated and started on an insulin gtt. He did not achieve diabetes control prior to admission, as evidenced by A1c of 9.0%. During this hospitalization, DKA is nearly resolved with the last AG at 11 on BMP. Second BMP sending now. If anion gap remains closed, he will be ready to transition off insulin gtt with SQ insulin. Since he does miss insulin injections at home and has high carbohydrates intake at home- would transition with lower insulin doses of insulin to start with today to prevent hypoglycemia. Please titrate to inpatient blood glucose target of 100-180mg/dl. Currently, blood and urine has been sent for sepsis r/o. Leuk esterase was negative on UA, so UTI is not likely. WBC is likely elevated refractory of high BG/DKA. If in fact sepsis is still high on differential, consider his mouth a source of infection with missing and broken teeth and oral ulcers.   Recommendations 1. Continue insulin infusion per DKA protocol  2. POC glucose hourly, BMP Q4h on insulin infusion- Due now  3. When anion gap closed x2 on BMP, can convert to SQ insulin. When ready to convert  Initiate the subcutaneous insulin order set with:  1. Basal insulin: Moderate dose 0.3 units/kg/day: Lantus 22 units/day. Give the first dose now then turn off insulin gtt two hours later  a. Tomorrow, if fasting BG over 200: advance dose to 0.4 units/kg/day  b. Tomorrow, if fasting BG under 100, reduce dose to 0.2 units/kg/day    2. Correctional insulin ACHS at normal sensitivity    3. Low dose bolus insulin. 0.05 units/kg/meal: 3 units Humalog/meal. Hold if patient consumes less than 50% of carbohydrates on meal tray  Advance by 2 units daily for persistent pre-prandial hyperglycemia    3. Continue consistent Carbohydrate diet (60grams CHO/meal)    4. Adjust to non-dextrose containing IVF    5. Avoid injections in arms due to lipohypertrophy    6. Consider dental eval for source of infection if sepsis continues to be a differential      On discharge:  Referrals and follow up with: Endocrinology, Dental, Ophthalmology and will need a new PCP. Billing Code(s)   [x] 48125/42567    Before making these care recommendations, I personally reviewed the hosptialization record, including laboratory and diagnostic data, medications and examined the patient at bedside (circumstances permitting).   Total minutes: 79    ANEESH Betancourt  Diabetes Clinical Nurse Specialist  [facility]  Access via [employer]

## 2021-09-24 NOTE — PROGRESS NOTES
Received message from patient's nurse in regards to blood glucose 100 on insulin drip             Discussion / orders:    Patient currently on normal saline at 150 mL/h  Changed IV fluid to D5 and half normal saline at 150 mL/h         Please note that this note was dictated using Dragon computer voice recognition software. Quite often unanticipated grammatical, syntax, homophones, and other interpretive errors are inadvertently transcribed by the computer software. Please disregard these errors. Please excuse any errors that have escaped final proofreading.

## 2021-09-24 NOTE — H&P
Addendum to initial H&P    ** Severe sepsis, POA due to UTI as evidence by tachycardia HR >100, WBC 13.9, lactic 2.4, FELECIA  --rocephin, IVF bolus. Blood cultures ordered. Repeat lactic ordered in 4 hours    **Acute urinary retention, POA  --bladder scan 999ml.   Beard inserted with 1.2L output  --IVF to match output  --flomax  --renal US or CT abd if Cr does not improve    Kylee Martinez MD

## 2021-09-24 NOTE — TELEPHONE ENCOUNTER
----- Message from Kirby Navarrete sent at 9/24/2021 12:30 PM EDT -----  Regarding: CARMEN/TELEPHONE  Contact: [phone]  General Message/Vendor Calls    Caller's first and last name: [facility]      Reason for call: Schedule hospital discharge      Callback required yes/no and why: Yes      Best contact number(s): [phone]      Details to clarify the request: [facility] () calling to schedule a hospital discharge (MRM) 1 week form 09/25/21.       Kirby Navarrete

## 2021-09-24 NOTE — PROGRESS NOTES
Hospitalist Progress Note    NAME: Duncan Adame   :  1980   MRN:  [id number]       Assessment / Plan:  DKA in DM type 1, A1c 13.2 in   --insulin drip, IVF  --check A1c  --consult DM management. Outpatient f/u with endocrinologist  --BMP, mag, phos q4h  --no clear infection. UA pending    :  AG closed x2  Bridge to SQ Lantus  DC D5 1/2NS, feed, DC Insulin drip, give 22 units Lantus     SIRS due to DKA with leukocytosis WBC 13, HR >100  --likely reactive to DKA. --await ua, cxr negative. : Follow sepsis workup     FELECIA Cr 2.2, baseline 0.9  --hold lasix, lisinopril  --IVF  --monitor for urinary retention. Check postvoid pressure    :  Cr improving     DM neuropathy  --continue lyrica     Hyperlipidemia  --continue statin     HTN  --continue metoprolol. Hold lasix, lisinopril     GERD  --change PPI to IV     BPH  Urinary retention  Continue tamsulosin  Continue with hansen    18.5 - 24.9 Normal weight / Body mass index is 23.11 kg/m². Estimated discharge date:   Barriers:    Code status: Full  Prophylaxis: Hep SQ  Recommended Disposition: Home w/Family     Subjective:     Chief Complaint / Reason for Physician Visit  Feels better this morning - wants to eat. Discussed with RN events overnight. Review of Systems:  Symptom Y/N Comments  Symptom Y/N Comments   Fever/Chills    Chest Pain     Poor Appetite    Edema     Cough    Abdominal Pain     Sputum    Joint Pain     SOB/JOHNSTON    Pruritis/Rash     Nausea/vomit    Tolerating PT/OT     Diarrhea    Tolerating Diet     Constipation    Other       Could NOT obtain due to:      Objective:     VITALS:   Last 24hrs VS reviewed since prior progress note.  Most recent are:  Patient Vitals for the past 24 hrs:   Temp Pulse Resp BP SpO2   21 0700 98.5 °F (36.9 °C) 88 11 127/76 94 %   21 0000 98.7 °F (37.1 °C) 87 23 (!) 98/50 99 %   21 2000 98.7 °F (37.1 °C) 95 22 (!) 126/56 93 %   21 1709 98.3 °F (36.8 °C) (!) 105 24 (!) 137/58 97 %   09/23/21 1500 98 °F (36.7 °C) (!) 101 27 (!) 120/52 98 %   09/23/21 1400 -- (!) 103 25 108/72 100 %   09/23/21 1300 -- 98 20 114/80 100 %   09/23/21 1200 -- 99 25 (!) 125/56 100 %   09/23/21 1155 -- 99 24 (!) 125/59 100 %       Intake/Output Summary (Last 24 hours) at 9/24/2021 1102  Last data filed at 9/24/2021 0700  Gross per 24 hour   Intake 1040 ml   Output 1450 ml   Net -410 ml        I had a face to face encounter and independently examined this patient on 9/24/2021, as outlined below:  PHYSICAL EXAM:  General: WD, WN. Alert, cooperative, no acute distress    EENT:  EOMI. Anicteric sclerae. MMM  Resp:  CTA bilaterally, no wheezing or rales. No accessory muscle use  CV:  Regular  rhythm,  No edema  GI:  Soft, Non distended, Non tender. +Bowel sounds  Neurologic:  Alert and oriented X 3, normal speech,   Psych:   Good insight. Not anxious nor agitated  Skin:  No rashes. No jaundice    Reviewed most current lab test results and cultures  YES  Reviewed most current radiology test results   YES  Review and summation of old records today    NO  Reviewed patient's current orders and MAR    YES  PMH/ reviewed - no change compared to H&P  ________________________________________________________________________  Care Plan discussed with:    Comments   Patient x    Family      RN x    Care Manager     Consultant                        Multidiciplinary team rounds were held today with , nursing, pharmacist and clinical coordinator. Patient's plan of care was discussed; medications were reviewed and discharge planning was addressed.      ________________________________________________________________________  Total NON critical care TIME:  35   Minutes    Total CRITICAL CARE TIME Spent:   Minutes non procedure based      Comments   >50% of visit spent in counseling and coordination of care     ________________________________________________________________________  [facility] Gulshan Smith MD     Procedures: see electronic medical records for all procedures/Xrays and details which were not copied into this note but were reviewed prior to creation of Plan. LABS:  I reviewed today's most current labs and imaging studies. Pertinent labs include:  Recent Labs     09/24/21  0309 09/23/21  0930   WBC 15.0* 13.9*   HGB 8.8* 10.6*   HCT 26.1* 33.2*    400     Recent Labs     09/24/21  0309 09/23/21  1805 09/23/21  1111 09/23/21  0930 09/23/21  0930    142 131*   < > 133*   K 4.1 3.4* 5.0   < > 5.1    106 94*   < > 92*   CO2 21 22 12*   < > 14*   * 326* 671*   < > 659*   BUN 41* 52* 51*   < > 48*   CREA 2.03* 2.48* 2.31*   < > 2.29*   CA 7.5* 8.1* 8.9   < > 9.1   MG 2.0 2.4 2.4  --   --    PHOS  --   --  7.5*  --   --    ALB  --   --   --   --  2.3*   TBILI  --   --   --   --  0.6   ALT  --   --   --   --  24    < > = values in this interval not displayed.        Signed: Raimundo Ruiz MD

## 2021-09-24 NOTE — PROGRESS NOTES
Transition of Care Plan:    RUR: 18%  Disposition: Home with family  Follow up appointments:PCP  DME needed:TBD  Transportation at Discharge: Family/friend  Alpha or means to access home:   Family     IM Medicare Letter: N/A due to pt has Medicaid  Is patient a BCPI-A Bundle:    N/A       If yes, was Bundle Letter given?:   No  Caregiver Contact:Ahmet Hillmother  Discharge Caregiver contacted prior to discharge? Reason for Admission:  High BS and N/V                     RUR Score:        18%             Plan for utilizing home health:      TBD    PCP: First and Last name:  Terrea Pallas, MD     Name of Practice:    Are you a current patient: Yes/No:  Yes   Approximate date of last visit: 2-3 months ago   Can you participate in a virtual visit with your PCP:  Yes                    Current Advanced Directive/Advance Care Plan: Full Code      Healthcare Decision Maker:   Click here to complete [email or web address] including selection of the Healthcare Decision Maker Relationship (ie \"Primary\")             Primary Decision Maker: Khadijah Hill     Valentin 13 (ACP) Conversation      Date of Conversation: 9/24/21  Conducted with: Patient with Decision Making Capacity    Healthcare Decision Maker:     Primary Decision Maker: Khadijah Hill  Click here to complete [email or web address] including selection of the Healthcare Decision Maker Relationship (ie \"Primary\")          Content/Action Overview:   DECLINED ACP conversation - will revisit periodically   Reviewed DNR/DNI and patient elects Full Code (Attempt Resuscitation)         Length of Voluntary ACP Conversation in minutes:  <16 minutes (Non-Billable)    [place]                     Transition of Care Plan:                        Pt is a 46 yo male who was admitted to [address] [place] [address] with a dx of high BS and N/V. CM introduce self, explain role and confirm demographics with pt. Pt stated that the number for his mother is wrong and that he is unable to provide the number because his phone is dead. Pt lives with his family in an one story home with no steps to enter. No DME reported. No hx of using home health or being admitted to SNF or inpatient rehab. Pt is independent in his ADLs and IADLs. Pt uses [facility] in [place]. Pt either drives himself to his medical appointments or his family will drive him. At the time of d/c pt's family/friend will transport. CM will continue to follow and assist with d/c planning. Care Management Interventions  PCP Verified by CM: Yes (Kelli Guajardo)  Mode of Transport at Discharge: Other (see comment) (Family/friends will transport)  Transition of Care Consult (CM Consult): Discharge Planning (Home with family)  Discharge Durable Medical Equipment: No (No DME reported)  Physical Therapy Consult: No  Occupational Therapy Consult: No  Speech Therapy Consult: No  Support Systems: Parent(s), Other Family Member(s) (Pt's family is supportive)  Confirm Follow Up Transport: Self (Pt drives himself to his medical appointments)  Discharge Location  Discharge Placement: Home with family assistance    Mario Alberto Mon.[email or web address].   Care Manager [id number] [facility]  [phone]

## 2021-09-24 NOTE — PROGRESS NOTES
End of Shift Note    Bedside shift change report given to [address] [place] [address],[address] (oncoming nurse) by Guzman Martino RN (offgoing nurse). Report included the following information SBAR, Kardex, ED Summary, Procedure Summary, Intake/Output, MAR, Recent Results and Cardiac Rhythm . Shift worked:  Day      Shift summary and any significant changes:    Pt off Insulin drip, follow sepsis workup, [facility] 9/25/21   Concerns for physician to address: None   Zone phone for oncoming shift:         Activity:  Activity Level: Up with Assistance, Bath Room Privileges  Number times ambulated in hallways past shift: 0  Number of times OOB to chair past shift: 0    Cardiac:   Cardiac Monitoring: Yes      Cardiac Rhythm: Sinus Rhythm    Access:   Current line(s): PIV     Genitourinary:   Urinary status: hansen    Respiratory:   O2 Device: Nasal cannula  Chronic home O2 use?: NO  Incentive spirometer at bedside: NO     GI:  Last Bowel Movement Date: 09/23/21  Current diet:  ADULT DIET Regular; 4 carb choices (60 gm/meal); No Salt Added (3-4 gm)  Passing flatus: YES  Tolerating current diet: YES       Pain Management:   Patient states pain is manageable on current regimen: YES    Skin:  Corey Score: 21  Interventions: speciality bed, PT/OT consult and internal/external urinary devices    Patient Safety:  Fall Score:  Total Score: 0  Interventions: bed/chair alarm, gripper socks and pt to call before getting OOB       Length of Stay:  Expected LOS: 4d 19h  Actual LOS: 1401 Leonard Gallegos RN

## 2021-09-25 VITALS
WEIGHT: 155 LBS | HEART RATE: 87 BPM | RESPIRATION RATE: 14 BRPM | TEMPERATURE: 97.9 F | DIASTOLIC BLOOD PRESSURE: 93 MMHG | BODY MASS INDEX: 23.49 KG/M2 | HEIGHT: 68 IN | OXYGEN SATURATION: 95 % | SYSTOLIC BLOOD PRESSURE: 161 MMHG

## 2021-09-25 LAB
ANION GAP SERPL CALC-SCNC: 2 MMOL/L (ref 5–15)
BASOPHILS # BLD: 0 K/UL (ref 0–0.1)
BASOPHILS NFR BLD: 0 % (ref 0–1)
BUN SERPL-MCNC: 21 MG/DL (ref 6–20)
BUN/CREAT SERPL: 13 (ref 12–20)
CALCIUM SERPL-MCNC: 9 MG/DL (ref 8.5–10.1)
CHLORIDE SERPL-SCNC: 106 MMOL/L (ref 97–108)
CO2 SERPL-SCNC: 27 MMOL/L (ref 21–32)
CREAT SERPL-MCNC: 1.6 MG/DL (ref 0.7–1.3)
DIFFERENTIAL METHOD BLD: ABNORMAL
EOSINOPHIL # BLD: 0.2 K/UL (ref 0–0.4)
EOSINOPHIL NFR BLD: 2 % (ref 0–7)
ERYTHROCYTE [DISTWIDTH] IN BLOOD BY AUTOMATED COUNT: 12.6 % (ref 11.5–14.5)
GLUCOSE BLD STRIP.AUTO-MCNC: 130 MG/DL (ref 65–117)
GLUCOSE BLD STRIP.AUTO-MCNC: 149 MG/DL (ref 65–117)
GLUCOSE SERPL-MCNC: 146 MG/DL (ref 65–100)
HCT VFR BLD AUTO: 29.7 % (ref 36.6–50.3)
HGB BLD-MCNC: 9.7 G/DL (ref 12.1–17)
IMM GRANULOCYTES # BLD AUTO: 0 K/UL (ref 0–0.04)
IMM GRANULOCYTES NFR BLD AUTO: 0 % (ref 0–0.5)
LYMPHOCYTES # BLD: 2.2 K/UL (ref 0.8–3.5)
LYMPHOCYTES NFR BLD: 23 % (ref 12–49)
MCH RBC QN AUTO: 30.3 PG (ref 26–34)
MCHC RBC AUTO-ENTMCNC: 32.7 G/DL (ref 30–36.5)
MCV RBC AUTO: 92.8 FL (ref 80–99)
MONOCYTES # BLD: 0.8 K/UL (ref 0–1)
MONOCYTES NFR BLD: 9 % (ref 5–13)
NEUTS SEG # BLD: 6.4 K/UL (ref 1.8–8)
NEUTS SEG NFR BLD: 66 % (ref 32–75)
NRBC # BLD: 0 K/UL (ref 0–0.01)
NRBC BLD-RTO: 0 PER 100 WBC
PLATELET # BLD AUTO: 357 K/UL (ref 150–400)
PMV BLD AUTO: 10.6 FL (ref 8.9–12.9)
POTASSIUM SERPL-SCNC: 3.5 MMOL/L (ref 3.5–5.1)
RBC # BLD AUTO: 3.2 M/UL (ref 4.1–5.7)
SERVICE CMNT-IMP: ABNORMAL
SERVICE CMNT-IMP: ABNORMAL
SODIUM SERPL-SCNC: 135 MMOL/L (ref 136–145)
WBC # BLD AUTO: 9.6 K/UL (ref 4.1–11.1)

## 2021-09-25 PROCEDURE — 80048 BASIC METABOLIC PNL TOTAL CA: CPT

## 2021-09-25 PROCEDURE — C9113 INJ PANTOPRAZOLE SODIUM, VIA: HCPCS | Performed by: HOSPITALIST

## 2021-09-25 PROCEDURE — 85025 COMPLETE CBC W/AUTO DIFF WBC: CPT

## 2021-09-25 PROCEDURE — 74011250636 HC RX REV CODE- 250/636: Performed by: HOSPITALIST

## 2021-09-25 PROCEDURE — 74011636637 HC RX REV CODE- 636/637: Performed by: GENERAL ACUTE CARE HOSPITAL

## 2021-09-25 PROCEDURE — 74011250637 HC RX REV CODE- 250/637: Performed by: HOSPITALIST

## 2021-09-25 PROCEDURE — 82962 GLUCOSE BLOOD TEST: CPT

## 2021-09-25 PROCEDURE — 74011000250 HC RX REV CODE- 250: Performed by: HOSPITALIST

## 2021-09-25 PROCEDURE — 74011250636 HC RX REV CODE- 250/636: Performed by: GENERAL ACUTE CARE HOSPITAL

## 2021-09-25 PROCEDURE — 51798 US URINE CAPACITY MEASURE: CPT

## 2021-09-25 PROCEDURE — 36415 COLL VENOUS BLD VENIPUNCTURE: CPT

## 2021-09-25 RX ORDER — INSULIN GLARGINE 100 [IU]/ML
22 INJECTION, SOLUTION SUBCUTANEOUS
Qty: 5 ML | Refills: 1 | Status: ON HOLD | OUTPATIENT
Start: 2021-09-25 | End: 2021-12-06

## 2021-09-25 RX ORDER — HYDRALAZINE HYDROCHLORIDE 20 MG/ML
10 INJECTION INTRAMUSCULAR; INTRAVENOUS ONCE
Status: COMPLETED | OUTPATIENT
Start: 2021-09-25 | End: 2021-09-25

## 2021-09-25 RX ADMIN — INSULIN LISPRO 3 UNITS: 100 INJECTION, SOLUTION INTRAVENOUS; SUBCUTANEOUS at 12:11

## 2021-09-25 RX ADMIN — TAMSULOSIN HYDROCHLORIDE 0.4 MG: 0.4 CAPSULE ORAL at 08:28

## 2021-09-25 RX ADMIN — INSULIN GLARGINE 22 UNITS: 100 INJECTION, SOLUTION SUBCUTANEOUS at 08:28

## 2021-09-25 RX ADMIN — PREGABALIN 75 MG: 25 CAPSULE ORAL at 08:28

## 2021-09-25 RX ADMIN — SODIUM CHLORIDE 40 MG: 9 INJECTION, SOLUTION INTRAMUSCULAR; INTRAVENOUS; SUBCUTANEOUS at 08:29

## 2021-09-25 RX ADMIN — METOPROLOL TARTRATE 25 MG: 25 TABLET ORAL at 08:28

## 2021-09-25 RX ADMIN — HYDRALAZINE HYDROCHLORIDE 10 MG: 20 INJECTION INTRAMUSCULAR; INTRAVENOUS at 12:10

## 2021-09-25 RX ADMIN — INSULIN LISPRO 2 UNITS: 100 INJECTION, SOLUTION INTRAVENOUS; SUBCUTANEOUS at 12:11

## 2021-09-25 RX ADMIN — HEPARIN SODIUM 5000 UNITS: 5000 INJECTION INTRAVENOUS; SUBCUTANEOUS at 05:54

## 2021-09-25 RX ADMIN — INSULIN LISPRO 3 UNITS: 100 INJECTION, SOLUTION INTRAVENOUS; SUBCUTANEOUS at 08:28

## 2021-09-25 RX ADMIN — Medication 10 ML: at 05:55

## 2021-09-25 NOTE — PROGRESS NOTES
1900 Bedside and Verbal shift change report given to [address] (oncoming nurse) by [facility], RN (offgoing nurse). Report included the following information SBAR, Kardex, Intake/Output, MAR, Recent Results and Cardiac Rhythm NSR. End of Shift Note    Bedside shift change report given to Candy Clemons RN (oncoming nurse) by Nathaniel Estrada RN (offgoing nurse). Report included the following information SBAR, Kardex, Intake/Output, MAR, Recent Results and Cardiac Rhythm NSR    Shift worked:  1900 - 0700     Shift summary and any significant changes:     No acute changes overnight. Concerns for physician to address:       Zone phone for oncoming shift:          Activity:  Activity Level: Up with Assistance  Number times ambulated in hallways past shift: 0  Number of times OOB to chair past shift: 0    Cardiac:   Cardiac Monitoring: Yes      Cardiac Rhythm: Sinus Rhythm    Access:   Current line(s): PIV     Genitourinary:   Urinary status: hansen    Respiratory:   O2 Device: None (Room air)  Chronic home O2 use?: NO  Incentive spirometer at bedside: NO     GI:  Last Bowel Movement Date: 09/23/21  Current diet:  ADULT DIET Regular; 4 carb choices (60 gm/meal); No Salt Added (3-4 gm)  Passing flatus: YES  Tolerating current diet: YES       Pain Management:   Patient states pain is manageable on current regimen: YES    Skin:  Corey Score: 21  Interventions: increase time out of bed and nutritional support     Patient Safety:  Fall Score:  Total Score: 1  Interventions: bed/chair alarm, gripper socks and pt to call before getting OOB       Length of Stay:  Expected LOS: 4d 19h  Actual LOS: 2      Nathaniel Estrada RN

## 2021-09-25 NOTE — PROGRESS NOTES
1014:Beard catheter removed at this time in preparation for discharge. Pt will be monitored to make sure that he voids prior to leaving. Pt educated to use urinal so that writer can see output. Will continue to monitor at this time. 1130: pt voided 120ml in urinal. Bladder scan PRV 174ml. Also with some hypertension. Dr. Zulema Neely made aware. New orders to give hydralazine 10mg IV and then d/c.    1230: pt to be discharged from unit. IV removed at this time. Medication, F/u appts, signs/symptoms of heart attack and stroke reviewed at this time.

## 2021-09-25 NOTE — DISCHARGE SUMMARY
Hospitalist Discharge Summary     Patient ID:  Korina Schaefer  [id number]  64 y.o.  1980 9/23/2021    PCP on record: Afsaneh Lea MD    Admit date: 9/23/2021  Discharge date and time: 9/25/2021    DISCHARGE DIAGNOSIS:  See below    CONSULTATIONS:  IP CONSULT TO HOSPITALIST    Excerpted HPI from H&P of Pepper Mulberry, MD:  Korina Schaefer is a 47 y.o. male with PMH DM type 1 dx 2003, HTN, BPH, bipolar disorder presents with high blood sugars and nausea, vomiting at 2am.  Sharp midsternal chest pain after he had been vomiting. Denies any bloody emesis. Pain worse with vomiting. No fever, chills, SOB, abdominal pain, diarrhea. He last took lantus and humalog yesterday. He is not sure what triggered DKA this time. ______________________________________________________________________  DISCHARGE SUMMARY/HOSPITAL COURSE:  for full details see H&P, daily progress notes, labs, consult notes. DKA in DM type 1, A1c 13.2 in 4/21 - resolved  --insulin drip, IVF  --check A1c  --consult DM management.  Outpatient f/u with endocrinologist  --BMP, mag, phos q4h  --no clear infection. Wolm Poll pending     9/24:  AG closed x2  Bridge to SQ Lantus  DC D5 1/2NS, feed, DC Insulin drip, give 22 units Lantus    9/25:  Pt stable for discharge today  FS well controlled  Home Lantus dose adjusted     SIRS due to DKA with leukocytosis WBC 13, HR >100  --likely reactive to DKA. --await ua, cxr negative.      9/24:   Follow sepsis workup    9/25:  Workup negative thus far, remains afebrile, WBC wnl  No need for abx, vitals and elevated WBC likely reactive due to DKA     FELECIA Cr 2.2, baseline 0.9  --hold lasix, lisinopril  --IVF  --monitor for urinary retention.  Check postvoid pressure     9/24:  Cr improving    9/25:  Cr nearly back at baseline  Remove hansen, monitor for voiding     DM neuropathy  --continue lyrica     Hyperlipidemia  --continue statin     HTN  --continue metoprolol.  Hold lasix, lisinopril for 2 more days.     GERD  --Home meds     BPH  Urinary retention  Continue tamsulosin  Continue with hansen  _______________________________________________________________________  Patient seen and examined by me on discharge day. Pertinent Findings:  Gen:    Not in distress  Chest: Clear lungs  CVS:   Regular rhythm. No edema  Abd:  Soft, not distended, not tender  Neuro:  Alert, oriented x3  _______________________________________________________________________  DISCHARGE MEDICATIONS:   Current Discharge Medication List            Patient Follow Up Instructions: Activity: Activity as tolerated  Diet: Diabetic Diet  Wound Care: None needed      Follow-up Information     Follow up With Specialties Details Why Candance Castor, MD Internal Medicine  A nurse will call to schedule a hospital follow up appointment.  [address] [place], [place] [address] [phone]          ________________________________________________________________    Risk of deterioration: Low    Condition at Discharge:  Stable  __________________________________________________________________    Disposition  Home with family, no needs    ____________________________________________________________________    Code Status: Full Code  ___________________________________________________________________      Total time in minutes spent coordinating this discharge (includes going over instructions, follow-up, prescriptions, and preparing report for sign off to her PCP) :  >30 minutes    Signed:  Raimundo Ruiz MD

## 2021-09-28 LAB
BACTERIA SPEC CULT: NORMAL
SERVICE CMNT-IMP: NORMAL

## 2021-10-04 ENCOUNTER — OFFICE VISIT (OUTPATIENT)
Dept: PRIMARY CARE CLINIC | Age: 41
End: 2021-10-04
Payer: MEDICAID

## 2021-10-04 VITALS
RESPIRATION RATE: 18 BRPM | HEIGHT: 68 IN | HEART RATE: 89 BPM | SYSTOLIC BLOOD PRESSURE: 110 MMHG | TEMPERATURE: 97.5 F | WEIGHT: 152.8 LBS | OXYGEN SATURATION: 100 % | BODY MASS INDEX: 23.16 KG/M2 | DIASTOLIC BLOOD PRESSURE: 80 MMHG

## 2021-10-04 DIAGNOSIS — R11.0 NAUSEA: ICD-10-CM

## 2021-10-04 DIAGNOSIS — N13.30 HYDRONEPHROSIS OF RIGHT KIDNEY: ICD-10-CM

## 2021-10-04 DIAGNOSIS — E11.9 DM TYPE 2, GOAL HBA1C < 7% (HCC): Primary | ICD-10-CM

## 2021-10-04 DIAGNOSIS — I10 ESSENTIAL HYPERTENSION: ICD-10-CM

## 2021-10-04 DIAGNOSIS — K31.84 GASTROPARESIS DUE TO DM (HCC): ICD-10-CM

## 2021-10-04 DIAGNOSIS — E10.40 DIABETIC NEUROPATHY, TYPE I DIABETES MELLITUS (HCC): ICD-10-CM

## 2021-10-04 DIAGNOSIS — E11.43 GASTROPARESIS DUE TO DM (HCC): ICD-10-CM

## 2021-10-04 DIAGNOSIS — H35.00 RETINOPATHY: ICD-10-CM

## 2021-10-04 DIAGNOSIS — E10.8 DM TYPE 1 CAUSING COMPLICATION (HCC): ICD-10-CM

## 2021-10-04 DIAGNOSIS — F17.200 SMOKER: ICD-10-CM

## 2021-10-04 DIAGNOSIS — J44.9 CHRONIC OBSTRUCTIVE PULMONARY DISEASE, UNSPECIFIED COPD TYPE (HCC): ICD-10-CM

## 2021-10-04 PROCEDURE — 99214 OFFICE O/P EST MOD 30 MIN: CPT | Performed by: INTERNAL MEDICINE

## 2021-10-04 RX ORDER — CHLORPHENIRAMINE MALEATE 4 MG
TABLET ORAL 2 TIMES DAILY
COMMUNITY
End: 2021-12-06

## 2021-10-04 RX ORDER — DOCUSATE SODIUM 100 MG/1
100 CAPSULE, LIQUID FILLED ORAL 2 TIMES DAILY
COMMUNITY
End: 2021-12-06

## 2021-10-04 RX ORDER — AMLODIPINE BESYLATE 5 MG/1
5 TABLET ORAL DAILY
COMMUNITY
End: 2021-11-15

## 2021-10-04 NOTE — PROGRESS NOTES
Abhinav Thompson is a 47 y.o.  male and presents with     Chief Complaint   Patient presents with   [facility] Follow Up    Leg Pain     leg pain x 6 months, pain level 8/10    Dizziness     dizzy x 3 months     Transition of carenote    Date of admission - 9/23  Date of discharge 9/25  Reason for admission - abdominal pain , nausea  Discharge diagnosis - DKA  Pt was admitted to hospital for elevated sugars and possible DKA. Pt says he missed appt as he had moved to [place] but thinkgs did not work out so he is back in [address] to see Dr Nadine Felipe , Endocrinology. Pt saw pulmonary for lung nodule. Was given inhaler. Has h/o smoking in the past,  Pt also saw Urology for scrotal swelling    During hospital stay uagrs were over 600 and renal insuff that improved some    Pt has been checking his blood sugar and it is in the 250 range. Pt gets low sugars at night time  Pt takes Lantus 22 units daily at night  Pt takes Novolog 5 units before eah meal and additional for carb counting. He used to have sling scale but cant find it.   Pt takes amlodipine in the morning            Past Medical History:   Diagnosis Date    Bipolar 1 disorder, depressed ([address]     Bipolar disorder ([address]     Depression     Diabetes ([address]     DKA, type 1 ([address] 1/27/2013    diagnosed age 23    H/O noncompliance with medical treatment, presenting hazards to health     MRSA (methicillin resistant staph aureus) culture positive     MRSA (methicillin resistant Staphylococcus aureus)     Face    Noncompliance with medication regimen     Second hand smoke exposure     Seizure ([address]     Seizures ([address] 2006 or 2007    one episode during alf     Past Surgical History:   Procedure Laterality Date    HX HEENT      top left wisdom tooth    HX ORTHOPAEDIC Left     wrist; MCV    UPPER GI ENDOSCOPY,BIOPSY  11/20/2018          Current Outpatient Medications   Medication Sig    docusate sodium (Colace) 100 mg capsule Take 100 mg by mouth two (2) times a day.  amLODIPine (NORVASC) 5 mg tablet Take 5 mg by mouth daily.  clotrimazole (LOTRIMIN) 1 % topical cream Apply  to affected area two (2) times a day.  insulin glargine (Lantus U-100 Insulin) 100 unit/mL injection 22 Units by SubCUTAneous route nightly.  pregabalin (Lyrica) 75 mg capsule Take 75 mg by mouth two (2) times a day.  ergocalciferol (ERGOCALCIFEROL) 1,250 mcg (50,000 unit) capsule Take 1 capsule by mouth once a week    rosuvastatin (CRESTOR) 40 mg tablet Take 1 Tablet by mouth nightly.  metoprolol tartrate (LOPRESSOR) 25 mg tablet Take 1 Tablet by mouth two (2) times a day.  lisinopriL (PRINIVIL, ZESTRIL) 20 mg tablet Take 1 Tablet by mouth daily.  furosemide (Lasix) 40 mg tablet Take 1 Tablet by mouth daily.  insulin aspart U-100 (NovoLOG Flexpen U-100 Insulin) 100 unit/mL (3 mL) inpn (Novolog or Humalog, whichever more preferred: Inject 5 units with each meal + correction insulin, up to 30 units per day    sildenafil citrate (VIAGRA) 50 mg tablet TAKE 1 TABLET BY MOUTH AS NEEDED    pantoprazole (PROTONIX) 40 mg tablet Take 1 Tab by mouth two (2) times a day.  potassium chloride (K-DUR, KLOR-CON) 20 mEq tablet Take 1 Tablet by mouth daily. (Patient not taking: Reported on 10/4/2021)    tamsulosin (FLOMAX) 0.4 mg capsule Take 1 Cap by mouth daily. No current facility-administered medications for this visit.      Health Maintenance   Topic Date Due    Foot Exam Q1  03/07/2021    Flu Vaccine (1) Never done    COVID-19 Vaccine (2 - Moderna 2-dose series) 10/09/2021    A1C test (Diabetic or Prediabetic)  12/23/2021    MICROALBUMIN Q1  04/01/2022    Eye Exam Retinal or Dilated  06/18/2022    Lipid Screen  08/10/2022    DTaP/Tdap/Td series (2 - Td or Tdap) 03/09/2028    Hepatitis C Screening  Completed    Pneumococcal 0-64 years  Aged [employer] History   Administered Date(s) Administered    (RETIRED) Pneumococcal Vaccine (Unspecified Type) 08/18/2011    Covid-19, MODERNA, Mrna, Lnp-s, Pf, 100mcg/0.5mL 09/11/2021    TD Vaccine 03/08/2011    Tdap 03/09/2018     No LMP for male patient. Allergies and Intolerances:   No Known Allergies    Family History:   Family History   Problem Relation Age of Onset    Diabetes Mother     Diabetes Other         neice, type 1        Social History:   He  reports that he quit smoking about 19 months ago. His smoking use included cigarettes. He started smoking about 22 years ago. He has a 1.60 pack-year smoking history. He has never used smokeless tobacco.  He  reports no history of alcohol use.             Review of Systems:   General: negative for - chills, fatigue, fever, weight change  Psych: negative for - anxiety, depression, irritability or mood swings  ENT: negative for - headaches, hearing change, nasal congestion, oral lesions, sneezing or sore throat  Heme/ Lymph: negative for - bleeding problems, bruising, pallor or swollen lymph nodes  Endo: negative for - hot flashes, polydipsia/polyuria or temperature intolerance  Resp: negative for - cough, shortness of breath or wheezing  CV: negative for - chest pain, edema or palpitations  GI: negative for - abdominal pain, change in bowel habits, constipation, diarrhea or nausea/vomiting  : negative for - dysuria, hematuria, incontinence, pelvic pain or vulvar/vaginal symptoms  MSK: negative for - joint pain, joint swelling or muscle pain  Neuro: negative for - confusion, headaches, seizures or weakness  Derm: negative for - dry skin, hair changes, rash or skin lesion changes          Physical:   Vitals:   Vitals:    10/04/21 1038 10/04/21 1110   BP: 128/79 110/80   Pulse: 89    Resp: 18    Temp: 97.5 °F (36.4 °C)    TempSrc: Temporal    SpO2: 100%    Weight: 152 lb 12.8 oz (69.3 kg)    Height: 5' 8\" (1.727 m)            Exam:   HEENT- atraumatic,normocephalic, awake, oriented, well nourished  Neck - supple,no enlarged lymph nodes, no JVD, no thyromegaly  Chest- CTA, no rhonchi, no crackles  Heart- rrr, no murmurs / gallop/rub  Abdomen- soft,BS+,NT, no hepatosplenomegaly  Ext - no c/c/edema   Neuro- no focal deficits. Power 5/5 all extremities  Skin - warm,dry, no obvious rashes. Review of Data:   LABS:   Lab Results   Component Value Date/Time    WBC 9.6 09/25/2021 03:35 AM    HGB 9.7 (L) 09/25/2021 03:35 AM    HCT 29.7 (L) 09/25/2021 03:35 AM    PLATELET 956 55/95/2969 03:35 AM     Lab Results   Component Value Date/Time    Sodium 135 (L) 09/25/2021 03:35 AM    Potassium 3.5 09/25/2021 03:35 AM    Chloride 106 09/25/2021 03:35 AM    CO2 27 09/25/2021 03:35 AM    Glucose 146 (H) 09/25/2021 03:35 AM    BUN 21 (H) 09/25/2021 03:35 AM    Creatinine 1.60 (H) 09/25/2021 03:35 AM     Lab Results   Component Value Date/Time    Cholesterol, total 410 (H) 04/01/2021 10:00 AM    HDL Cholesterol 66 04/01/2021 10:00 AM    LDL, calculated 282.6 (H) 04/01/2021 10:00 AM    Triglyceride 307 (H) 04/01/2021 10:00 AM     No components found for: GPT        Impression / Plan:        ICD-10-CM ICD-9-CM    1. DM type 2, goal HbA1c < 7% ([facility])  E11.9 250.00 NM GASTRIC EMPTY STDY   2. Essential hypertension  I10 401.9    3. Retinopathy  H35.00 362.10    4. Hydronephrosis of right kidney  N13.30 591 US RETROPERITONEUM COMP   5. Chronic obstructive pulmonary disease, unspecified COPD type ([facility]  J44.9 496    6. Smoker  F17.200 305.1    7. Gastroparesis due to DM (HCC)  E11.43 250.60     K31.84 536.3    8. Diabetic neuropathy, type I diabetes mellitus ([facility]  E10.40 250.61 REFERRAL TO ENDOCRINOLOGY     357.2    9. DM type 1 causing complication (HCC)  D98.9 250.91 REFERRAL TO ENDOCRINOLOGY   10. Nausea  R11.0 787.02 NM GASTRIC EMPTY STDY     HTN- borderline low blood pressure , dizziness - stop amlodipine    DM - follow sliding scale for NOvolog. R/o DM gastroparesiss      DM reintopathy - sees Ophthal. , appt has already been set up.     Explained to patient risk benefits of the medications. Advised patient to stop meds if having any side effects. Pt verbalized understanding of the instructions. I have discussed the diagnosis with the patient and the intended plan as seen in the above orders. The patient has received an after-visit summary and questions were answered concerning future plans. I have discussed medication side effects and warnings with the patient as well. I have reviewed the plan of care with the patient, accepted their input and they are in agreement with the treatment goals. Reviewed plan of care. Patient has provided input and agrees with goals. Follow-up and Dispositions    · Return in about 3 months (around 1/4/2022).          Virginia Gómez MD      Sliding scale for NOvolog      Blood sugar                     Inuslin dose    0-100                                      0  101-150                                  2  151-200                                  4  201-250                                  6  251-300                                  8  301-350                                 10  351-400                                 12

## 2021-10-04 NOTE — PROGRESS NOTES
Chief Complaint   Patient presents with   [facility] Follow Up    Leg Pain     leg pain x 6 months, pain level 8/10    Dizziness     dizzy x 3 months        Visit Vitals  /79 (BP 1 Location: Left upper arm, BP Patient Position: Sitting)   Pulse 89   Temp 97.5 °F (36.4 °C) (Temporal)   Resp 18   Ht 5' 8\" (1.727 m)   Wt 152 lb 12.8 oz (69.3 kg)   SpO2 100%   BMI 23.23 kg/m²        1. Have you been to the ER, urgent care clinic since your last visit? Hospitalized since your last visit? Yes When: 9/23/2021 [facility]     2. Have you seen or consulted any other health care providers outside of the [address] [place] [address] since your last visit? Include any pap smears or colon screening.  No

## 2021-10-04 NOTE — PATIENT INSTRUCTIONS
Sliding scale for NOvolog      Blood sugar                     Inuslin dose    0-100                                      0  101-150                                  2  151-200                                  4  201-250                                  6  251-300                                  8  301-350                                 10  351-400                                 12

## 2021-10-07 ENCOUNTER — HOSPITAL ENCOUNTER (EMERGENCY)
Age: 41
Discharge: HOME | End: 2021-10-07
Attending: EMERGENCY MEDICINE
Payer: MEDICAID

## 2021-10-07 VITALS
HEIGHT: 68 IN | BODY MASS INDEX: 23.04 KG/M2 | OXYGEN SATURATION: 98 % | SYSTOLIC BLOOD PRESSURE: 152 MMHG | DIASTOLIC BLOOD PRESSURE: 87 MMHG | RESPIRATION RATE: 16 BRPM | HEART RATE: 93 BPM | WEIGHT: 152 LBS | TEMPERATURE: 98 F

## 2021-10-07 DIAGNOSIS — T50.901A ACCIDENTAL DRUG OVERDOSE, INITIAL ENCOUNTER: Primary | ICD-10-CM

## 2021-10-07 LAB
ALBUMIN SERPL-MCNC: 2.7 G/DL (ref 3.5–5)
ALBUMIN/GLOB SERPL: 0.5 {RATIO} (ref 1.1–2.2)
ALP SERPL-CCNC: 242 U/L (ref 45–117)
ALT SERPL-CCNC: 93 U/L (ref 12–78)
ANION GAP SERPL CALC-SCNC: 3 MMOL/L (ref 5–15)
AST SERPL-CCNC: 107 U/L (ref 15–37)
BASOPHILS # BLD: 0.1 K/UL (ref 0–0.1)
BASOPHILS NFR BLD: 1 % (ref 0–1)
BILIRUB SERPL-MCNC: 0.2 MG/DL (ref 0.2–1)
BUN SERPL-MCNC: 26 MG/DL (ref 6–20)
BUN/CREAT SERPL: 15 (ref 12–20)
CALCIUM SERPL-MCNC: 9.7 MG/DL (ref 8.5–10.1)
CHLORIDE SERPL-SCNC: 102 MMOL/L (ref 97–108)
CO2 SERPL-SCNC: 29 MMOL/L (ref 21–32)
CREAT SERPL-MCNC: 1.69 MG/DL (ref 0.7–1.3)
DIFFERENTIAL METHOD BLD: ABNORMAL
EOSINOPHIL # BLD: 0.2 K/UL (ref 0–0.4)
EOSINOPHIL NFR BLD: 2 % (ref 0–7)
ERYTHROCYTE [DISTWIDTH] IN BLOOD BY AUTOMATED COUNT: 12.9 % (ref 11.5–14.5)
GLOBULIN SER CALC-MCNC: 5.2 G/DL (ref 2–4)
GLUCOSE BLD STRIP.AUTO-MCNC: 246 MG/DL (ref 65–117)
GLUCOSE SERPL-MCNC: 246 MG/DL (ref 65–100)
HCT VFR BLD AUTO: 36.1 % (ref 36.6–50.3)
HGB BLD-MCNC: 12.3 G/DL (ref 12.1–17)
IMM GRANULOCYTES # BLD AUTO: 0 K/UL (ref 0–0.04)
IMM GRANULOCYTES NFR BLD AUTO: 0 % (ref 0–0.5)
LIPASE SERPL-CCNC: 41 U/L (ref 73–393)
LYMPHOCYTES # BLD: 1.6 K/UL (ref 0.8–3.5)
LYMPHOCYTES NFR BLD: 14 % (ref 12–49)
MCH RBC QN AUTO: 31 PG (ref 26–34)
MCHC RBC AUTO-ENTMCNC: 34.1 G/DL (ref 30–36.5)
MCV RBC AUTO: 90.9 FL (ref 80–99)
MONOCYTES # BLD: 0.7 K/UL (ref 0–1)
MONOCYTES NFR BLD: 6 % (ref 5–13)
NEUTS SEG # BLD: 9.1 K/UL (ref 1.8–8)
NEUTS SEG NFR BLD: 77 % (ref 32–75)
NRBC # BLD: 0 K/UL (ref 0–0.01)
NRBC BLD-RTO: 0 PER 100 WBC
PLATELET # BLD AUTO: 439 K/UL (ref 150–400)
PMV BLD AUTO: 10.3 FL (ref 8.9–12.9)
POTASSIUM SERPL-SCNC: 4.5 MMOL/L (ref 3.5–5.1)
PROT SERPL-MCNC: 7.9 G/DL (ref 6.4–8.2)
RBC # BLD AUTO: 3.97 M/UL (ref 4.1–5.7)
SERVICE CMNT-IMP: ABNORMAL
SODIUM SERPL-SCNC: 134 MMOL/L (ref 136–145)
WBC # BLD AUTO: 11.8 K/UL (ref 4.1–11.1)

## 2021-10-07 PROCEDURE — 83690 ASSAY OF LIPASE: CPT

## 2021-10-07 PROCEDURE — 99284 EMERGENCY DEPT VISIT MOD MDM: CPT

## 2021-10-07 PROCEDURE — 82962 GLUCOSE BLOOD TEST: CPT

## 2021-10-07 PROCEDURE — 36415 COLL VENOUS BLD VENIPUNCTURE: CPT

## 2021-10-07 PROCEDURE — 80053 COMPREHEN METABOLIC PANEL: CPT

## 2021-10-07 PROCEDURE — 85025 COMPLETE CBC W/AUTO DIFF WBC: CPT

## 2021-10-07 RX ORDER — ONDANSETRON 4 MG/1
4 TABLET, ORALLY DISINTEGRATING ORAL
Status: DISCONTINUED | OUTPATIENT
Start: 2021-10-07 | End: 2021-10-07

## 2021-10-07 RX ORDER — ONDANSETRON 2 MG/ML
4 INJECTION INTRAMUSCULAR; INTRAVENOUS
Status: DISCONTINUED | OUTPATIENT
Start: 2021-10-07 | End: 2021-10-07 | Stop reason: HOSPADM

## 2021-10-07 RX ORDER — NALOXONE HYDROCHLORIDE 4 MG/.1ML
SPRAY NASAL
Qty: 2 EACH | Refills: 0 | Status: SHIPPED | OUTPATIENT
Start: 2021-10-07 | End: 2021-12-06

## 2021-10-07 NOTE — DISCHARGE INSTRUCTIONS
You were evaluated in the emergency department for accidental drug overdose reversed with narcan. It will be important for you to follow-up with addiction resources if you feel you need help. If you develop worsening symptoms such as another accidental overdose, chest pain, or shortness of breath, please return to the emergency department immediately. It was a pleasure taking care of you at [facility] Emergency Department today. We know that when you come to [facility], you are entrusting us with your health, comfort, and safety. Our physicians and nurses honor that trust, and we truly appreciate the opportunity to care for you and your loved ones. We also value your feedback. If you receive a survey about your Emergency Department experience today, please fill it out. We care about our patients' feedback, and we listen to what you have to say. Thank you! Substance Abuse and Addiction Resources    [facility]  [phone]  Closed meeting- family members that have been affected bysomeone alcohol abuse  Open meeting everyone can attend.     Alcoholic's Anonymous [phone]  Non professional (alcoholics in recovery helping others)  Hold Meetings (talk about sobriety, have desire)  No charge    [address]  Venia Denver is the Treatment Consultant in the [facility]  Free one-on-one phone consultation and insurance verification  12 step based meetings and philosophy  Family programs and sessions    [facility] [phone]  Free individual assessment  Intensive outpatient outpatient program  Ambulatory withdrawal management/detoxification  Insurance required    [employer]  [phone] [facility] location), [phone] ([place] location)  An Office Based Opioid Treatment Program  Individual and Group therapy, Peer Recovery Services and Care Coordination  Accepting Medicare, Medicaid and Commercial/private insurance  $200 a month self-pay program (does not include medicine or outside labs)  [facility], [address] [place] [phone] (Monday - Friday, 9a-5p. Standing [address] 10:00-11:00 Monday)  [address] [facility], [address] [place] [address] [address] [facility] (Tuesday - Friday, 9a-5p Standing [address] [place] y ED referral appointment 10:00-11:00 Tuesday - Friday)    [facility] [phone]  Good for patients with no insurance, Medicaid, Medicare  Sliding fee scale available for self pay  Patients go Monday-Friday from 8-4:30 to central intake for a shelter and then to [facility] for services  Offers a variety of services I.e. Laundry, shower, eye clinic, medical clinic, dental, substance abuse services, case management, etc.    [facility] [phone]  Make appointment with counselor  $32 fee for initial hour intake    [place] [address] [phone]  Offers Detox and Inpatient Treatment for men only. Social detox  Is a homeless shelter with longterm 12 step program. Can choose just access to the hshelter component  Must be able to walk <1miles one way to attend classes, be highly motivated and willing to complete all 12 steps. 8 months- 1 year is the typical length of stay if accepted    [facility] [phone]  For residents of [facility]  They offer outpatient treatment and can make referrals for inpatient careBased on income. Will [employer]. Accept Medicaid. They have a walk in clinic that patients can go to be screened for services. Two on the [facility] and [place] side of [facility]:   [place] [address], [place] [address] (near [facility]). Walk in hours: Mon or Wed       12:30pm - Tawana Willard [address]. Walk in hours: Tuesday 12:30pm 3pm Wed       8:30am- [address] [facility] [phone] [facility] location)   [email or web address] [address]  Alcohol detox and rehabilitation  Opioid and other drug rehabilitation, Medication assisted treatment  Accepts commercial/private insurance  Call or email Neto Perez: [phone], [email or web address] to schedule an appointment    [facility] [phone]  $3500 entry fee  12 step meeting plan  No sex offenders accepted. Must get a job within 30 days after admission  After the 12 week, additional $125/week to stay    Narcotic Anonymous [phone], [phone]  Will refer to [facility] into Triage (takes 10-15 minutes)  Proof of [facility] residence with Picture ID  Medicaid and Self Pay. NO Private insurance    [place] [phone]  Referrals come from organizations like [employer], [employer], [facility]. Must be self pay. No insurance allowed. No patients on prescribed narcotics. No sex offenders. Need all medical mental health information and medication list sent prior to admit. [facility] [phone] ext [address] between 21-65  Need social security card and picture ID  Must pass breath test and 10 panel Urine Drug Screen  No Sex Offenders or Psychiatric patients  Must not have been a 3x repeat at this facility  6 month in house- has to participate in work therapy 40 hours/week  Participates in spiritual classes, bible study and Religion    [facility] [address] [employer] [address], sent by [employer]  [address] (by [address])    [address] Located within [facility]  [phone]  Monday through Friday 8:30am to 5:00pm  Brief Telephone Interview. Then will be scheduled for next substance abuse services orientation group and then scheduled for intake interview.     [facility]  [phone]  Walk in Monday through Friday 9:00AM to 3:00PM  Bring Picture ID, Proof of residence (piece of mail), Proof of Insurance ([address] scale), Proof of income (any)    [place] [phone]  Walk in then Assessment by licensed professional   [place] office ([address] [facility]) Monday, Tuesday, Thursday  9AM to 789 [facility] office ([address] [facility] [address] [place], [address]) Abdirahmantonya Barr 81  [phone]  Walk In Monday to Friday starting at [address] ID, Proof of residence (piece of mail), Proof of insurance (Medicaid/sliding scale)  At 9AM is the Substance Abuse Services Orientation Group and then scheduled for Intake Evaluation.

## 2021-10-07 NOTE — ED PROVIDER NOTES
EMERGENCY DEPARTMENT HISTORY AND PHYSICAL EXAM      Date: (Not on file)  Patient Name: Herbert Castañeda    History of Presenting Illness     Chief Complaint   Patient presents with    Vomiting     pt was found by family members unresponsive. they called EMS who gave 4 mg narcan. pt woke up and admitted to snorting 15 mg percocet. He has had n/v since administration of narcan    Drug Overdose       History Provided By: Patient    HPI: Herbert Castañeda, 47 y.o. male with history of type 1 diabetes insulin-dependent, bipolar disorder, seizure disorder, prior episodes of DKA presents to the ED with cc of accidental drug overdose. Patient states that he took a single 30 mg Percocet, crushed it and snorted it earlier today. EMS called by  family member and EMS administered Narcan in route. Patient endorses nausea with a few episodes of vomiting after Narcan but is currently feeling better in the ED. He denies taking any other medications today. Denies head injury or trauma. He states that he has been evaluated in the ED for Narcan reversal of accidental opioid overdose in the past and states \"I made a stupid mistake today\". He is looking into addiction resources. Currently feels better in the ED without any complaints. There are no other complaints, changes, or physical findings at this time. PCP: Christelle Cuevas MD    No current facility-administered medications on file prior to encounter. Current Outpatient Medications on File Prior to Encounter   Medication Sig Dispense Refill    docusate sodium (Colace) 100 mg capsule Take 100 mg by mouth two (2) times a day.  amLODIPine (NORVASC) 5 mg tablet Take 5 mg by mouth daily.  clotrimazole (LOTRIMIN) 1 % topical cream Apply  to affected area two (2) times a day.  insulin glargine (Lantus U-100 Insulin) 100 unit/mL injection 22 Units by SubCUTAneous route nightly.  5 mL 1    pregabalin (Lyrica) 75 mg capsule Take 75 mg by mouth two (2) times a day.  ergocalciferol (ERGOCALCIFEROL) 1,250 mcg (50,000 unit) capsule Take 1 capsule by mouth once a week 12 Capsule 0    rosuvastatin (CRESTOR) 40 mg tablet Take 1 Tablet by mouth nightly. 30 Tablet 3    potassium chloride (K-DUR, KLOR-CON) 20 mEq tablet Take 1 Tablet by mouth daily. (Patient not taking: Reported on 10/4/2021) 30 Tablet 3    metoprolol tartrate (LOPRESSOR) 25 mg tablet Take 1 Tablet by mouth two (2) times a day. 60 Tablet 1    lisinopriL (PRINIVIL, ZESTRIL) 20 mg tablet Take 1 Tablet by mouth daily. 30 Tablet 3    furosemide (Lasix) 40 mg tablet Take 1 Tablet by mouth daily. 30 Tablet 1    insulin aspart U-100 (NovoLOG Flexpen U-100 Insulin) 100 unit/mL (3 mL) inpn (Novolog or Humalog, whichever more preferred: Inject 5 units with each meal + correction insulin, up to 30 units per day 10 Adjustable Dose Pre-filled Pen Syringe 3    sildenafil citrate (VIAGRA) 50 mg tablet TAKE 1 TABLET BY MOUTH AS NEEDED 10 Tab 0    pantoprazole (PROTONIX) 40 mg tablet Take 1 Tab by mouth two (2) times a day. 60 Tab 0    tamsulosin (FLOMAX) 0.4 mg capsule Take 1 Cap by mouth daily.  30 Cap 1       Past History     Past Medical History:  Past Medical History:   Diagnosis Date    Bipolar 1 disorder, depressed ([address]     [facility]     Depression     Diabetes ([facility]     DKA, type 1 ([facility] 1/27/2013    diagnosed age 23    H/O noncompliance with medical treatment, presenting hazards to health     MRSA (methicillin resistant staph aureus) culture positive     MRSA (methicillin resistant Staphylococcus aureus)     Face    Noncompliance with medication regimen     Second hand smoke exposure     Seizure ([address]     Seizures ([address] 2006 or 2007    one episode during prison       Past Surgical History:  Past Surgical History:   Procedure Laterality Date    HX HEENT      top left wisdom tooth    HX ORTHOPAEDIC Left     wrist; MCV    UPPER GI ENDOSCOPY,BIOPSY  11/20/2018 Family History:  Family History   Problem Relation Age of Onset    Diabetes Mother     Diabetes Other         neice, type 1        Social History:  Social History     Tobacco Use    Smoking status: Former Smoker     Packs/day: 0.10     Years: 16.00     Pack years: 1.60     Types: Cigarettes     Start date: 10/4/1999     Quit date: 2020     Years since quittin.6    Smokeless tobacco: Never Used   Substance Use Topics    Alcohol use: No    Drug use: No       Allergies:  No Known Allergies      Review of Systems   Review of Systems   Constitutional: Negative for chills and fever. Respiratory: Negative for cough and shortness of breath. Cardiovascular: Negative for chest pain and leg swelling. Gastrointestinal: Positive for nausea and vomiting. Negative for abdominal pain. Musculoskeletal: Negative for back pain and gait problem. Skin: Negative for color change and rash. Neurological: Negative for dizziness, weakness, light-headedness and headaches. All other systems reviewed and are negative. Physical Exam   Physical Exam  Vitals and nursing note reviewed. Constitutional:       General: He is not in acute distress. Appearance: Normal appearance. He is not ill-appearing or toxic-appearing. HENT:      Head: Normocephalic and atraumatic. Nose: Nose normal.      Mouth/Throat:      Mouth: Mucous membranes are moist.   Eyes:      Extraocular Movements: Extraocular movements intact. Pupils: Pupils are equal, round, and reactive to light. Cardiovascular:      Rate and Rhythm: Normal rate and regular rhythm. Heart sounds: No murmur heard. Pulmonary:      Effort: Pulmonary effort is normal. No respiratory distress. Breath sounds: Normal breath sounds. No wheezing. Abdominal:      General: There is no distension. Palpations: Abdomen is soft. Tenderness: There is no abdominal tenderness. There is no guarding or rebound.    Musculoskeletal: General: No swelling or tenderness. Normal range of motion. Cervical back: Normal range of motion and neck supple. Right lower leg: No edema. Left lower leg: No edema. Skin:     General: Skin is warm and dry. Coloration: Skin is not pale. Findings: No erythema. Neurological:      General: No focal deficit present. Mental Status: He is alert and oriented to person, place, and time. Comments: No focal deficits, ambulatory in ED without difficulty. Diagnostic Study Results     Labs -     Recent Results (from the past 12 hour(s))   GLUCOSE, POC    Collection Time: 10/07/21  6:15 PM   Result Value Ref Range    Glucose (POC) 246 (H) 65 - 117 mg/dL    Performed by Jan Eller    CBC WITH AUTOMATED DIFF    Collection Time: 10/07/21  6:17 PM   Result Value Ref Range    WBC 11.8 (H) 4.1 - 11.1 K/uL    RBC 3.97 (L) 4.10 - 5.70 M/uL    HGB 12.3 12.1 - 17.0 g/dL    HCT 36.1 (L) 36.6 - 50.3 %    MCV 90.9 80.0 - 99.0 FL    MCH 31.0 26.0 - 34.0 PG    MCHC 34.1 30.0 - 36.5 g/dL    RDW 12.9 11.5 - 14.5 %    PLATELET 550 (H) 312 - 400 K/uL    MPV 10.3 8.9 - 12.9 FL    NRBC 0.0 0  WBC    ABSOLUTE NRBC 0.00 0.00 - 0.01 K/uL    NEUTROPHILS 77 (H) 32 - 75 %    LYMPHOCYTES 14 12 - 49 %    MONOCYTES 6 5 - 13 %    EOSINOPHILS 2 0 - 7 %    BASOPHILS 1 0 - 1 %    IMMATURE GRANULOCYTES 0 0.0 - 0.5 %    ABS. NEUTROPHILS 9.1 (H) 1.8 - 8.0 K/UL    ABS. LYMPHOCYTES 1.6 0.8 - 3.5 K/UL    ABS. MONOCYTES 0.7 0.0 - 1.0 K/UL    ABS. EOSINOPHILS 0.2 0.0 - 0.4 K/UL    ABS. BASOPHILS 0.1 0.0 - 0.1 K/UL    ABS. IMM. GRANS. 0.0 0.00 - 0.04 K/UL    DF AUTOMATED         Radiologic Studies -   No orders to display     CT Results  (Last 48 hours)    None        CXR Results  (Last 48 hours)    None          Medical Decision Making   I am the first provider for this patient.     I reviewed the vital signs, available nursing notes, past medical history, past surgical history, family history and social history. Vital Signs-Reviewed the patient's vital signs. Patient Vitals for the past 12 hrs:   Temp Pulse Resp BP SpO2   10/07/21 1714 98 °F (36.7 °C) 93 16 (!) 152/87 98 %       Records Reviewed: Nursing Notes    Provider Notes (Medical Decision Making):   19-year-old male here with accidental opioid overdose reversed with Narcan by EMS. He did have some nausea and vomiting in the ED reversed with Zofran and currently feels better on my evaluation. He is afebrile and vital signs are stable. No signs of pulmonary edema or hypoxia. Glucose in the 200s, he has no signs or symptoms of DKA today. He does not want to stay for further laboratory evaluation. Patient evaluated in the ED for total of 2.5 hours after which he was tolerating p.o. and feels well and at baseline, no sign of opioid overdose after Narcan has worn off. I discussed drug cessation and addiction resources with patient. Offered Narcan and he has accepted. All questions answered and he agrees with plan as above. Drug Abuse Cessation: Assessment and Intervention  Patient was assessed for drug abuse and addiction using the DAST-10 screening tool and determined to be high risk. Discussed the risks of drug abuse and the long term sequelae of opioid use with the patient such as increased risk of depression, respiratory failure, heart attack, stroke, and death. Patient was offered follow-up resources on local rehabilitation facilities. Patient accepted the resources. The patient verbalized their understanding. . Counseled patient for approximately 15 minutes (between 15-30 minutes). ED Course:   Initial assessment performed. The patients presenting problems have been discussed, and they are in agreement with the care plan formulated and outlined with them. I have encouraged them to ask questions as they arise throughout their visit. Discharge Note:  The patient has been re-evaluated and is ready for discharge. Reviewed available results with patient. Counseled patient on diagnosis and care plan. Patient has expressed understanding, and all questions have been answered. Patient agrees with plan and agrees to follow up as recommended, or to return to the ED if their symptoms worsen. Discharge instructions have been provided and explained to the patient, along with reasons to return to the ED. Disposition:  Discharge    DISCHARGE PLAN:  1. Current Discharge Medication List      START taking these medications    Details   naloxone (Narcan) 4 mg/actuation nasal spray Use 1 spray intranasally, then discard. Repeat with new spray every 2 min as needed for opioid overdose symptoms, alternating nostrils. Qty: 2 Each, Refills: 0  Start date: 10/7/2021           2. Follow-up Information     Follow up With Specialties Details Why Contact Info    [facility]  Schedule an appointment as soon as possible for a visit  As needed [address] [facility] [address] [address] [facility] EMERGENCY DEPT Emergency Medicine Go to  As needed, If symptoms worsen [address] [place] [address]  [phone]        3. Return to ED if worse     Diagnosis     Clinical Impression:   1. Accidental drug overdose, initial encounter        Attestations:  I am the first and primary provider of record for this patient's ED encounter. I personally performed the services described above in this documentation. Joie Lee MD    Please note that this dictation was completed with K[employer], the computer voice recognition software. Quite often unanticipated grammatical, syntax, homophones, and other interpretive errors are inadvertently transcribed by the computer software. Please disregard these errors. Please excuse any errors that have escaped final proofreading. Thank you.

## 2021-10-12 ENCOUNTER — HOSPITAL ENCOUNTER (EMERGENCY)
Age: 41
Discharge: HOME | End: 2021-10-12
Attending: EMERGENCY MEDICINE
Payer: MEDICAID

## 2021-10-12 VITALS
HEART RATE: 92 BPM | HEIGHT: 68 IN | BODY MASS INDEX: 23.04 KG/M2 | OXYGEN SATURATION: 99 % | SYSTOLIC BLOOD PRESSURE: 170 MMHG | TEMPERATURE: 98 F | RESPIRATION RATE: 16 BRPM | WEIGHT: 152 LBS | DIASTOLIC BLOOD PRESSURE: 89 MMHG

## 2021-10-12 DIAGNOSIS — Z86.39 HISTORY OF DIABETES MELLITUS, TYPE I: ICD-10-CM

## 2021-10-12 DIAGNOSIS — R11.2 NON-INTRACTABLE VOMITING WITH NAUSEA, UNSPECIFIED VOMITING TYPE: Primary | ICD-10-CM

## 2021-10-12 LAB
ALBUMIN SERPL-MCNC: 2.4 G/DL (ref 3.5–5)
ALBUMIN/GLOB SERPL: 0.6 {RATIO} (ref 1.1–2.2)
ALP SERPL-CCNC: 186 U/L (ref 45–117)
ALT SERPL-CCNC: 33 U/L (ref 12–78)
AMPHET UR QL SCN: NEGATIVE
ANION GAP SERPL CALC-SCNC: 3 MMOL/L (ref 5–15)
APPEARANCE UR: CLEAR
AST SERPL-CCNC: 28 U/L (ref 15–37)
BACTERIA URNS QL MICRO: NEGATIVE /HPF
BARBITURATES UR QL SCN: NEGATIVE
BASOPHILS # BLD: 0 K/UL (ref 0–0.1)
BASOPHILS NFR BLD: 1 % (ref 0–1)
BENZODIAZ UR QL: NEGATIVE
BILIRUB SERPL-MCNC: 0.4 MG/DL (ref 0.2–1)
BILIRUB UR QL: NEGATIVE
BUN SERPL-MCNC: 16 MG/DL (ref 6–20)
BUN/CREAT SERPL: 11 (ref 12–20)
CALCIUM SERPL-MCNC: 8.8 MG/DL (ref 8.5–10.1)
CANNABINOIDS UR QL SCN: NEGATIVE
CHLORIDE SERPL-SCNC: 103 MMOL/L (ref 97–108)
CO2 SERPL-SCNC: 32 MMOL/L (ref 21–32)
COCAINE UR QL SCN: NEGATIVE
COLOR UR: ABNORMAL
CREAT SERPL-MCNC: 1.46 MG/DL (ref 0.7–1.3)
DIFFERENTIAL METHOD BLD: ABNORMAL
DRUG SCRN COMMENT,DRGCM: NORMAL
EOSINOPHIL # BLD: 0 K/UL (ref 0–0.4)
EOSINOPHIL NFR BLD: 1 % (ref 0–7)
EPITH CASTS URNS QL MICRO: ABNORMAL /LPF
ERYTHROCYTE [DISTWIDTH] IN BLOOD BY AUTOMATED COUNT: 12.8 % (ref 11.5–14.5)
GLOBULIN SER CALC-MCNC: 4.2 G/DL (ref 2–4)
GLUCOSE SERPL-MCNC: 217 MG/DL (ref 65–100)
GLUCOSE UR STRIP.AUTO-MCNC: >1000 MG/DL
HCT VFR BLD AUTO: 32.1 % (ref 36.6–50.3)
HGB BLD-MCNC: 10.9 G/DL (ref 12.1–17)
HGB UR QL STRIP: ABNORMAL
HYALINE CASTS URNS QL MICRO: ABNORMAL /LPF (ref 0–5)
IMM GRANULOCYTES # BLD AUTO: 0 K/UL (ref 0–0.04)
IMM GRANULOCYTES NFR BLD AUTO: 0 % (ref 0–0.5)
KETONES UR QL STRIP.AUTO: 15 MG/DL
LEUKOCYTE ESTERASE UR QL STRIP.AUTO: NEGATIVE
LYMPHOCYTES # BLD: 1.4 K/UL (ref 0.8–3.5)
LYMPHOCYTES NFR BLD: 24 % (ref 12–49)
MCH RBC QN AUTO: 30.2 PG (ref 26–34)
MCHC RBC AUTO-ENTMCNC: 34 G/DL (ref 30–36.5)
MCV RBC AUTO: 88.9 FL (ref 80–99)
METHADONE UR QL: NEGATIVE
MONOCYTES # BLD: 0.5 K/UL (ref 0–1)
MONOCYTES NFR BLD: 8 % (ref 5–13)
NEUTS SEG # BLD: 4.1 K/UL (ref 1.8–8)
NEUTS SEG NFR BLD: 66 % (ref 32–75)
NITRITE UR QL STRIP.AUTO: NEGATIVE
NRBC # BLD: 0 K/UL (ref 0–0.01)
NRBC BLD-RTO: 0 PER 100 WBC
OPIATES UR QL: NEGATIVE
PCP UR QL: NEGATIVE
PH UR STRIP: 7 [PH] (ref 5–8)
PLATELET # BLD AUTO: 345 K/UL (ref 150–400)
PMV BLD AUTO: 10.9 FL (ref 8.9–12.9)
POTASSIUM SERPL-SCNC: 3.3 MMOL/L (ref 3.5–5.1)
PROT SERPL-MCNC: 6.6 G/DL (ref 6.4–8.2)
PROT UR STRIP-MCNC: >300 MG/DL
RBC # BLD AUTO: 3.61 M/UL (ref 4.1–5.7)
RBC #/AREA URNS HPF: ABNORMAL /HPF (ref 0–5)
SODIUM SERPL-SCNC: 138 MMOL/L (ref 136–145)
SP GR UR REFRACTOMETRY: 1.03 (ref 1–1.03)
UA: UC IF INDICATED,UAUC: ABNORMAL
UROBILINOGEN UR QL STRIP.AUTO: 1 EU/DL (ref 0.2–1)
WBC # BLD AUTO: 6.1 K/UL (ref 4.1–11.1)
WBC URNS QL MICRO: ABNORMAL /HPF (ref 0–4)

## 2021-10-12 PROCEDURE — 96374 THER/PROPH/DIAG INJ IV PUSH: CPT

## 2021-10-12 PROCEDURE — 74011250636 HC RX REV CODE- 250/636: Performed by: EMERGENCY MEDICINE

## 2021-10-12 PROCEDURE — 99284 EMERGENCY DEPT VISIT MOD MDM: CPT

## 2021-10-12 PROCEDURE — 96361 HYDRATE IV INFUSION ADD-ON: CPT

## 2021-10-12 PROCEDURE — 85025 COMPLETE CBC W/AUTO DIFF WBC: CPT

## 2021-10-12 PROCEDURE — 80053 COMPREHEN METABOLIC PANEL: CPT

## 2021-10-12 PROCEDURE — 96375 TX/PRO/DX INJ NEW DRUG ADDON: CPT

## 2021-10-12 PROCEDURE — 36415 COLL VENOUS BLD VENIPUNCTURE: CPT

## 2021-10-12 PROCEDURE — 81001 URINALYSIS AUTO W/SCOPE: CPT

## 2021-10-12 PROCEDURE — 74011000250 HC RX REV CODE- 250: Performed by: EMERGENCY MEDICINE

## 2021-10-12 PROCEDURE — 80307 DRUG TEST PRSMV CHEM ANLYZR: CPT

## 2021-10-12 RX ORDER — METOCLOPRAMIDE HYDROCHLORIDE 5 MG/ML
10 INJECTION INTRAMUSCULAR; INTRAVENOUS
Status: COMPLETED | OUTPATIENT
Start: 2021-10-12 | End: 2021-10-12

## 2021-10-12 RX ORDER — ONDANSETRON 4 MG/1
4 TABLET, ORALLY DISINTEGRATING ORAL
Qty: 10 TABLET | Refills: 0 | Status: SHIPPED | OUTPATIENT
Start: 2021-10-12 | End: 2021-12-06

## 2021-10-12 RX ORDER — ONDANSETRON 2 MG/ML
4 INJECTION INTRAMUSCULAR; INTRAVENOUS
Status: COMPLETED | OUTPATIENT
Start: 2021-10-12 | End: 2021-10-12

## 2021-10-12 RX ADMIN — FAMOTIDINE 20 MG: 10 INJECTION, SOLUTION INTRAVENOUS at 18:00

## 2021-10-12 RX ADMIN — METOCLOPRAMIDE 10 MG: 5 INJECTION, SOLUTION INTRAMUSCULAR; INTRAVENOUS at 20:20

## 2021-10-12 RX ADMIN — ONDANSETRON 4 MG: 2 INJECTION INTRAMUSCULAR; INTRAVENOUS at 18:00

## 2021-10-12 RX ADMIN — SODIUM CHLORIDE 1000 ML: 9 INJECTION, SOLUTION INTRAVENOUS at 18:00

## 2021-10-13 NOTE — ED PROVIDER NOTES
EMERGENCY DEPARTMENT HISTORY AND PHYSICAL EXAM      Date: 10/12/2021  Patient Name: Soledad Dior    History of Presenting Illness     Chief Complaint   Patient presents with    Vomiting     pt with hx of type 1 DM has been vomiting for approx one week. D/c'd from [facility] yesterday. He has not been in dka, but they were concerned about it. pt states his sugars are well controlled right now. He is not able to keep anything down and did not feel he could when d/c'd yesterday       History Provided By: Patient    HPI: Soledad Dior, 47 y.o. male with PMHx significant for type 1 diabetes, bipolar disorder, who presents with a chief complaint of 4 days of nausea and vomiting. Patient tells me he has had symptoms since he was seen here after an unintentional drug overdose. He was just discharged from [facility] yesterday after admission for DKA. Tells me he has been unable to keep anything down since but has not had any nausea medicine at home. No fever, chest pain, shortness of breath. Reports epigastric discomfort that he feels is from vomiting. Denies drug use today. Denies any marijuana use. PCP: Lacey Diaz MD    There are no other complaints, changes, or physical findings at this time. Current Outpatient Medications   Medication Sig Dispense Refill    ondansetron (Zofran ODT) 4 mg disintegrating tablet Take 1 Tablet by mouth every eight (8) hours as needed for Nausea. 10 Tablet 0    naloxone (Narcan) 4 mg/actuation nasal spray Use 1 spray intranasally, then discard. Repeat with new spray every 2 min as needed for opioid overdose symptoms, alternating nostrils. 2 Each 0    docusate sodium (Colace) 100 mg capsule Take 100 mg by mouth two (2) times a day.  amLODIPine (NORVASC) 5 mg tablet Take 5 mg by mouth daily.  clotrimazole (LOTRIMIN) 1 % topical cream Apply  to affected area two (2) times a day.       insulin glargine (Lantus U-100 Insulin) 100 unit/mL injection 22 Units by SubCUTAneous route nightly. 5 mL 1    pregabalin (Lyrica) 75 mg capsule Take 75 mg by mouth two (2) times a day.  ergocalciferol (ERGOCALCIFEROL) 1,250 mcg (50,000 unit) capsule Take 1 capsule by mouth once a week 12 Capsule 0    rosuvastatin (CRESTOR) 40 mg tablet Take 1 Tablet by mouth nightly. 30 Tablet 3    potassium chloride (K-DUR, KLOR-CON) 20 mEq tablet Take 1 Tablet by mouth daily. (Patient not taking: Reported on 10/4/2021) 30 Tablet 3    metoprolol tartrate (LOPRESSOR) 25 mg tablet Take 1 Tablet by mouth two (2) times a day. 60 Tablet 1    lisinopriL (PRINIVIL, ZESTRIL) 20 mg tablet Take 1 Tablet by mouth daily. 30 Tablet 3    furosemide (Lasix) 40 mg tablet Take 1 Tablet by mouth daily. 30 Tablet 1    insulin aspart U-100 (NovoLOG Flexpen U-100 Insulin) 100 unit/mL (3 mL) inpn (Novolog or Humalog, whichever more preferred: Inject 5 units with each meal + correction insulin, up to 30 units per day 10 Adjustable Dose Pre-filled Pen Syringe 3    sildenafil citrate (VIAGRA) 50 mg tablet TAKE 1 TABLET BY MOUTH AS NEEDED 10 Tab 0    pantoprazole (PROTONIX) 40 mg tablet Take 1 Tab by mouth two (2) times a day. 60 Tab 0    tamsulosin (FLOMAX) 0.4 mg capsule Take 1 Cap by mouth daily.  30 Cap 1     Past History     Past Medical History:  Past Medical History:   Diagnosis Date    B[facility] 1 disorder, depressed [facility] [address]     Bipolar disorder ([facility] [address] [facility]     Depression     Diabetes ([facility] [address] [facility]     DKA, type 1 ([facility] [address] 1/27/2013    diagnosed age 23    H/O noncompliance with medical treatment, presenting hazards to health     MRSA (methicillin resistant staph aureus) culture positive     MRSA (methicillin resistant Staphylococcus aureus)     Face    Noncompliance with medication regimen     Second hand smoke exposure     Seizure ([address]     Seizures ([facility] [address] 2006 or 2007    one episode during prison     Past Surgical History:  Past Surgical History:   Procedure Laterality Date    HX [facility]      top left wisdom tooth    HX ORTHOPAEDIC Left     wrist; MCV    UPPER GI ENDOSCOPY,BIOPSY  2018          Family History:  Family History   Problem Relation Age of Onset    Diabetes Mother     Diabetes Other         neice, type 1      Social History:  Social History     Tobacco Use    Smoking status: Former Smoker     Packs/day: 0.10     Years: 16.00     Pack years: 1.60     Types: Cigarettes     Start date: 10/4/1999     Quit date: 2020     Years since quittin.6    Smokeless tobacco: Never Used   Substance Use Topics    Alcohol use: No    Drug use: No     Allergies:  No Known Allergies  Review of Systems   Review of Systems   Constitutional: Negative for chills and fever. HENT: Negative for congestion, rhinorrhea and sore throat. Respiratory: Negative for cough and shortness of breath. Cardiovascular: Negative for chest pain. Gastrointestinal: Positive for abdominal pain, nausea and vomiting. Genitourinary: Negative for dysuria and urgency. Skin: Negative for rash. Neurological: Negative for dizziness, light-headedness and headaches. All other systems reviewed and are negative. Physical Exam   Physical Exam  Vitals and nursing note reviewed. Constitutional:       General: He is not in acute distress. Appearance: He is well-developed. HENT:      Head: Normocephalic and atraumatic. Eyes:      Conjunctiva/sclera: Conjunctivae normal.      Pupils: Pupils are equal, round, and reactive to light. Cardiovascular:      Rate and Rhythm: Normal rate and regular rhythm. Pulmonary:      Effort: Pulmonary effort is normal. No respiratory distress. Breath sounds: Normal breath sounds. No stridor. Abdominal:      General: There is no distension. Palpations: Abdomen is soft. Tenderness: There is no abdominal tenderness. Musculoskeletal:         General: Normal range of motion. Cervical back: Normal range of motion. Skin:     General: Skin is warm and dry. Neurological:      Mental Status: He is alert and oriented to person, place, and time. Diagnostic Study Results   Labs -     Recent Results (from the past 12 hour(s))   CBC WITH AUTOMATED DIFF    Collection Time: 10/12/21  3:31 PM   Result Value Ref Range    WBC 6.1 4.1 - 11.1 K/uL    RBC 3.61 (L) 4.10 - 5.70 M/uL    HGB 10.9 (L) 12.1 - 17.0 g/dL    HCT 32.1 (L) 36.6 - 50.3 %    MCV 88.9 80.0 - 99.0 FL    MCH 30.2 26.0 - 34.0 PG    MCHC 34.0 30.0 - 36.5 g/dL    RDW 12.8 11.5 - 14.5 %    PLATELET 382 576 - 864 K/uL    MPV 10.9 8.9 - 12.9 FL    NRBC 0.0 0  WBC    ABSOLUTE NRBC 0.00 0.00 - 0.01 K/uL    NEUTROPHILS 66 32 - 75 %    LYMPHOCYTES 24 12 - 49 %    MONOCYTES 8 5 - 13 %    EOSINOPHILS 1 0 - 7 %    BASOPHILS 1 0 - 1 %    IMMATURE GRANULOCYTES 0 0.0 - 0.5 %    ABS. NEUTROPHILS 4.1 1.8 - 8.0 K/UL    ABS. LYMPHOCYTES 1.4 0.8 - 3.5 K/UL    ABS. MONOCYTES 0.5 0.0 - 1.0 K/UL    ABS. EOSINOPHILS 0.0 0.0 - 0.4 K/UL    ABS. BASOPHILS 0.0 0.0 - 0.1 K/UL    ABS. IMM. GRANS. 0.0 0.00 - 0.04 K/UL    DF AUTOMATED     METABOLIC PANEL, COMPREHENSIVE    Collection Time: 10/12/21  3:31 PM   Result Value Ref Range    Sodium 138 136 - 145 mmol/L    Potassium 3.3 (L) 3.5 - 5.1 mmol/L    Chloride 103 97 - 108 mmol/L    CO2 32 21 - 32 mmol/L    Anion gap 3 (L) 5 - 15 mmol/L    Glucose 217 (H) 65 - 100 mg/dL    BUN 16 6 - 20 MG/DL    Creatinine 1.46 (H) 0.70 - 1.30 MG/DL    BUN/Creatinine ratio 11 (L) 12 - 20      GFR est AA >60 >60 ml/min/1.73m2    GFR est non-AA 54 (L) >60 ml/min/1.73m2    Calcium 8.8 8.5 - 10.1 MG/DL    Bilirubin, total 0.4 0.2 - 1.0 MG/DL    ALT (SGPT) 33 12 - 78 U/L    AST (SGOT) 28 15 - 37 U/L    Alk.  phosphatase 186 (H) 45 - 117 U/L    Protein, total 6.6 6.4 - 8.2 g/dL    Albumin 2.4 (L) 3.5 - 5.0 g/dL    Globulin 4.2 (H) 2.0 - 4.0 g/dL    A-G Ratio 0.6 (L) 1.1 - 2.2     URINALYSIS W/ REFLEX CULTURE    Collection Time: 10/12/21  6:09 PM    Specimen: Urine   Result Value Ref Range    Color YELLOW/STRAW      Appearance CLEAR CLEAR      Specific gravity 1.029 1.003 - 1.030      pH (UA) 7.0 5.0 - 8.0      Protein >300 (A) NEG mg/dL    Glucose >1,000 (A) NEG mg/dL    Ketone 15 (A) NEG mg/dL    Bilirubin Negative NEG      Blood TRACE (A) NEG      Urobilinogen 1.0 0.2 - 1.0 EU/dL    Nitrites Negative NEG      Leukocyte Esterase Negative NEG      WBC 5-10 0 - 4 /hpf    RBC 10-20 0 - 5 /hpf    Epithelial cells FEW FEW /lpf    Bacteria Negative NEG /hpf    UA:UC IF INDICATED CULTURE NOT INDICATED BY UA RESULT CNI      Hyaline cast 5-10 0 - 5 /lpf   DRUG SCREEN, URINE    Collection Time: 10/12/21  6:09 PM   Result Value Ref Range    AMPHETAMINES Negative NEG      BARBITURATES Negative NEG      BENZODIAZEPINES Negative NEG      COCAINE Negative NEG      METHADONE Negative NEG      OPIATES Negative NEG      PCP(PHENCYCLIDINE) Negative NEG      THC (TH-CANNABINOL) Negative NEG      Drug screen comment (NOTE)        Radiologic Studies -   No orders to display     No results found. Medical Decision Making   I am the first provider for this patient. I reviewed the vital signs, available nursing notes, past medical history, past surgical history, family history and social history. Vital Signs-Reviewed the patient's vital signs. Patient Vitals for the past 12 hrs:   Temp Pulse Resp BP SpO2   10/12/21 1927 98 °F (36.7 °C) 92 16 (!) 170/89 99 %   10/12/21 1845 -- -- -- (!) 178/83 99 %   10/12/21 1800 -- -- -- (!) 154/93 100 %   10/12/21 1520 98.1 °F (36.7 °C) 91 16 (!) 158/93 100 %       Pulse Oximetry Analysis - 99% on ra    Cardiac Monitor:   Rate: 92 bpm  Rhythm: Normal Sinus Rhythm        Records Reviewed: Nursing Notes and Old Medical Records    Provider Notes (Medical Decision Making):   Patient presents with a chief complaint of nausea and vomiting. History of diabetes. I suspect he may have some component of gastroparesis. Will check basic lab work to ensure he is not in DKA.   I do not feel he needs any additional imaging at this time as he has a benign abdominal exam.  He is afebrile and nontoxic-appearing. Will give fluids, antiemetics, and reevaluate. ED Course:   Initial assessment performed. The patients presenting problems have been discussed, and they are in agreement with the care plan formulated and outlined with them. I have encouraged them to ask questions as they arise throughout their visit. Labs without evidence of DKA. Patient feeling improved after fluids and antiemetics. Able to tolerate p.o. Will discharge with nausea medication and instructions to follow-up with his outpatient provider. Procedures:  Procedures    Critical Care:  none    Disposition:  Discharge Note:  The patient has been re-evaluated and is ready for discharge. Reviewed available results with patient. Counseled patient on diagnosis and care plan. Patient has expressed understanding, and all questions have been answered. Patient agrees with plan and agrees to follow up as recommended, or to return to the ED if their symptoms worsen. Discharge instructions have been provided and explained to the patient, along with reasons to return to the ED. PLAN:  1. Discharge Medication List as of 10/12/2021  9:32 PM      START taking these medications    Details   ondansetron (Zofran ODT) 4 mg disintegrating tablet Take 1 Tablet by mouth every eight (8) hours as needed for Nausea., Normal, Disp-10 Tablet, R-0         CONTINUE these medications which have NOT CHANGED    Details   naloxone (Narcan) 4 mg/actuation nasal spray Use 1 spray intranasally, then discard. Repeat with new spray every 2 min as needed for opioid overdose symptoms, alternating nostrils. , Normal, Disp-2 Each, R-0      docusate sodium (Colace) 100 mg capsule Take 100 mg by mouth two (2) times a day., Historical Med      amLODIPine (NORVASC) 5 mg tablet Take 5 mg by mouth daily. , Historical Med      clotrimazole (LOTRIMIN) 1 % topical cream Apply to affected area two (2) times a day., Historical Med      insulin glargine (Lantus U-100 Insulin) 100 unit/mL injection 22 Units by SubCUTAneous route nightly., Normal, Disp-5 mL, R-1      pregabalin (Lyrica) 75 mg capsule Take 75 mg by mouth two (2) times a day., Historical Med      ergocalciferol (ERGOCALCIFEROL) 1,250 mcg (50,000 unit) capsule Take 1 capsule by mouth once a week, Normal, Disp-12 Capsule, R-0      rosuvastatin (CRESTOR) 40 mg tablet Take 1 Tablet by mouth nightly., Normal, Disp-30 Tablet, R-3      potassium chloride (K-DUR, KLOR-CON) 20 mEq tablet Take 1 Tablet by mouth daily. , Normal, Disp-30 Tablet, R-3      metoprolol tartrate (LOPRESSOR) 25 mg tablet Take 1 Tablet by mouth two (2) times a day., Normal, Disp-60 Tablet, R-1      lisinopriL (PRINIVIL, ZESTRIL) 20 mg tablet Take 1 Tablet by mouth daily. , Normal, Disp-30 Tablet, R-3      furosemide (Lasix) 40 mg tablet Take 1 Tablet by mouth daily. , Normal, Disp-30 Tablet, R-1      insulin aspart U-100 (NovoLOG Flexpen U-100 Insulin) 100 unit/mL (3 mL) inpn (Novolog or Humalog, whichever more preferred: Inject 5 units with each meal + correction insulin, up to 30 units per day, Normal, Disp-10 Adjustable Dose Pre-filled Pen Syringe, R-3      sildenafil citrate (VIAGRA) 50 mg tablet TAKE 1 TABLET BY MOUTH AS NEEDED, Normal, Disp-10 Tab, R-0      pantoprazole (PROTONIX) 40 mg tablet Take 1 Tab by mouth two (2) times a day., Print, Disp-60 Tab, R-0      tamsulosin (FLOMAX) 0.4 mg capsule Take 1 Cap by mouth daily. , Normal, Disp-30 Cap, R-1           2.    Follow-up Information     Follow up With Specialties Details Why Kira Buckner MD Internal Medicine Schedule an appointment as soon as possible for a visit   [address] [facility] EMERGENCY DEPT Emergency Medicine  As needed, If symptoms worsen [address]  [phone]        Return to ED if worse     Diagnosis     Clinical Impression:   1. Non-intractable vomiting with nausea, unspecified vomiting type    2. History of diabetes mellitus, type I            Please note that this dictation was completed with [employer], the computer voice recognition software. Quite often unanticipated grammatical, syntax, homophones, and other interpretive errors are inadvertently transcribed by the computer software. Please disregard these errors.   Please excuse any errors that have escaped final proofreading

## 2021-10-13 NOTE — ED NOTES
Discharge instructions reviewed with patient, he didn't have any questions at this time. He is ambulatory, stable, and he mother is providing a ride home for him. Olga Smallwood

## 2021-10-14 ENCOUNTER — TELEPHONE (OUTPATIENT)
Dept: PRIMARY CARE CLINIC | Age: 41
End: 2021-10-14

## 2021-10-14 NOTE — TELEPHONE ENCOUNTER
Spoke to [place] and informed to tell rep they will need to send the request to patient's endocrinologist ( per note on form that dr Howard Marin stated )

## 2021-10-27 DIAGNOSIS — E11.9 DM TYPE 2, GOAL HBA1C < 7% (HCC): ICD-10-CM

## 2021-10-27 DIAGNOSIS — E55.9 VITAMIN D DEFICIENCY: ICD-10-CM

## 2021-10-28 NOTE — ED TRIAGE NOTES
Assumed care of this patient. He is alert and oriented x4. Patient ambulatory to ED with c/o right dorsal foot pain, proximal to 2nd tarsal joint. He has redness and drainage to area. Denies injury to area.
No

## 2021-10-30 RX ORDER — ERGOCALCIFEROL 1.25 MG/1
CAPSULE ORAL
Qty: 12 CAPSULE | Refills: 0 | Status: SHIPPED | OUTPATIENT
Start: 2021-10-30 | End: 2022-08-20

## 2021-10-30 RX ORDER — INSULIN GLARGINE 100 [IU]/ML
INJECTION, SOLUTION SUBCUTANEOUS
Qty: 30 ML | Refills: 0 | Status: SHIPPED | OUTPATIENT
Start: 2021-10-30 | End: 2021-11-15 | Stop reason: SDUPTHER

## 2021-11-15 ENCOUNTER — OFFICE VISIT (OUTPATIENT)
Dept: PRIMARY CARE CLINIC | Age: 41
End: 2021-11-15
Payer: COMMERCIAL

## 2021-11-15 VITALS
OXYGEN SATURATION: 100 % | HEIGHT: 68 IN | TEMPERATURE: 97.1 F | BODY MASS INDEX: 23.61 KG/M2 | HEART RATE: 75 BPM | RESPIRATION RATE: 18 BRPM | WEIGHT: 155.8 LBS | SYSTOLIC BLOOD PRESSURE: 132 MMHG | DIASTOLIC BLOOD PRESSURE: 84 MMHG

## 2021-11-15 DIAGNOSIS — D64.9 ANEMIA, UNSPECIFIED TYPE: ICD-10-CM

## 2021-11-15 DIAGNOSIS — I10 ESSENTIAL HYPERTENSION: ICD-10-CM

## 2021-11-15 DIAGNOSIS — K31.84 GASTROPARESIS DUE TO DM (HCC): ICD-10-CM

## 2021-11-15 DIAGNOSIS — E11.9 DM TYPE 2, GOAL HBA1C < 7% (HCC): ICD-10-CM

## 2021-11-15 DIAGNOSIS — R80.9 PROTEINURIA, UNSPECIFIED TYPE: ICD-10-CM

## 2021-11-15 DIAGNOSIS — N18.30 STAGE 3 CHRONIC KIDNEY DISEASE, UNSPECIFIED WHETHER STAGE 3A OR 3B CKD (HCC): Primary | ICD-10-CM

## 2021-11-15 DIAGNOSIS — E78.00 HYPERCHOLESTEREMIA: ICD-10-CM

## 2021-11-15 DIAGNOSIS — E55.9 VITAMIN D DEFICIENCY: ICD-10-CM

## 2021-11-15 DIAGNOSIS — E11.43 GASTROPARESIS DUE TO DM (HCC): ICD-10-CM

## 2021-11-15 PROCEDURE — 99214 OFFICE O/P EST MOD 30 MIN: CPT | Performed by: INTERNAL MEDICINE

## 2021-11-15 PROCEDURE — 3052F HG A1C>EQUAL 8.0%<EQUAL 9.0%: CPT | Performed by: INTERNAL MEDICINE

## 2021-11-15 RX ORDER — INSULIN GLARGINE 100 [IU]/ML
INJECTION, SOLUTION SUBCUTANEOUS
Qty: 30 ML | Refills: 4 | Status: ON HOLD | OUTPATIENT
Start: 2021-11-15 | End: 2022-02-14 | Stop reason: SDUPTHER

## 2021-11-15 RX ORDER — PANTOPRAZOLE SODIUM 40 MG/1
40 TABLET, DELAYED RELEASE ORAL DAILY
Qty: 90 TABLET | Refills: 1 | Status: ON HOLD | OUTPATIENT
Start: 2021-11-15 | End: 2021-12-06 | Stop reason: SDUPTHER

## 2021-11-15 NOTE — PROGRESS NOTES
Andrew Robledo is a 47 y.o.  male and presents with     Chief Complaint   Patient presents with    Leg Swelling     Pt is here for follow up. Pt has not made follow up appt with Endocrinology as his father passed away recently who ha d ESRD and cardiac arrest.  Pt has freestyle shailesh and last 14 day average is 332. Pt needs refill on protonix. Pt is currently administering Lantus 22 units subcut daily and average 10-15 units HUmalog 3 times a day. Blood pressure is better today. Patient is concerned of some swelling in his legs that requires a day to the day. He is currently taking Lyrica for neuropathy in his feet. Previous urinalysis showed proteinuria. He also has chronic kidney disease. Has not seen a nephrologist  He has mild anemia. However denies any rectal bleeding. He had endoscopy in the past that was normal.  Food does not go down very well he eats. He is taking Protonix twice daily. Past Medical History:   Diagnosis Date    [facility]     Bipolar disorder [facility]     Depression     Diabetes ([facility]     DKA, type 1 ([facility] 1/27/2013    diagnosed age 23    H/O noncompliance with medical treatment, presenting hazards to health     MRSA (methicillin resistant staph aureus) culture positive     MRSA (methicillin resistant Staphylococcus aureus)     Face    Noncompliance with medication regimen     Second hand smoke exposure     Seizure ([facility]     Seizures ([facility] 2006 or 2007    one episode during correction     Past Surgical History:   Procedure Laterality Date    HX HEENT      top left wisdom tooth    HX ORTHOPAEDIC Left     wrist; MCV    UPPER GI ENDOSCOPY,BIOPSY  11/20/2018          Current Outpatient Medications   Medication Sig    insulin glargine (Lantus Solostar U-100 Insulin) 100 unit/mL (3 mL) inpn INJECT 30 UNITS SUBCUTANEOUSLY DAILY    pantoprazole (Protonix) 40 mg tablet Take 1 Tablet by mouth daily.     ergocalciferol (ERGOCALCIFEROL) 1,250 mcg (50,000 unit) capsule Take 1 capsule by mouth once a week    ondansetron (Zofran ODT) 4 mg disintegrating tablet Take 1 Tablet by mouth every eight (8) hours as needed for Nausea.  naloxone (Narcan) 4 mg/actuation nasal spray Use 1 spray intranasally, then discard. Repeat with new spray every 2 min as needed for opioid overdose symptoms, alternating nostrils.  docusate sodium (Colace) 100 mg capsule Take 100 mg by mouth two (2) times a day.  clotrimazole (LOTRIMIN) 1 % topical cream Apply  to affected area two (2) times a day.  insulin glargine (Lantus U-100 Insulin) 100 unit/mL injection 22 Units by SubCUTAneous route nightly.  pregabalin (Lyrica) 75 mg capsule Take 75 mg by mouth two (2) times a day.  rosuvastatin (CRESTOR) 40 mg tablet Take 1 Tablet by mouth nightly.  metoprolol tartrate (LOPRESSOR) 25 mg tablet Take 1 Tablet by mouth two (2) times a day.  lisinopriL (PRINIVIL, ZESTRIL) 20 mg tablet Take 1 Tablet by mouth daily.  furosemide (Lasix) 40 mg tablet Take 1 Tablet by mouth daily.  insulin aspart U-100 (NovoLOG Flexpen U-100 Insulin) 100 unit/mL (3 mL) inpn (Novolog or Humalog, whichever more preferred: Inject 5 units with each meal + correction insulin, up to 30 units per day    sildenafil citrate (VIAGRA) 50 mg tablet TAKE 1 TABLET BY MOUTH AS NEEDED     No current facility-administered medications for this visit.      Health Maintenance   Topic Date Due    Foot Exam Q1  03/07/2021    Flu Vaccine (1) Never done    A1C test (Diabetic or Prediabetic)  12/23/2021    MICROALBUMIN Q1  04/01/2022    Eye Exam Retinal or Dilated  06/18/2022    Lipid Screen  08/10/2022    DTaP/Tdap/Td series (2 - Td or Tdap) 03/09/2028    Hepatitis C Screening  Completed    COVID-19 Vaccine  Completed    Pneumococcal 0-64 years  Aged [employer] History   Administered Date(s) Administered    (RETIRED) Pneumococcal Vaccine (Unspecified Type) 08/18/2011    CATHY-19, Miley Trevino, Primary or Immunocompromised Series, MRNA, PF, 100mcg/0.5mL 09/11/2021, 10/13/2021    MMR 03/23/1995    Pneumococcal Polysaccharide (PPSV-23) 08/18/2011    Rabies Vaccine IM 09/17/1992    TD Vaccine 03/08/2011    Tdap 03/09/2018     No LMP for male patient. Allergies and Intolerances:   No Known Allergies    Family History:   Family History   Problem Relation Age of Onset    Diabetes Mother     Diabetes Other         neice, type 1        Social History:   He  reports that he quit smoking about 20 months ago. His smoking use included cigarettes. He started smoking about 22 years ago. He has a 1.60 pack-year smoking history. He has never used smokeless tobacco.  He  reports no history of alcohol use.             Review of Systems:   General: negative for - chills, fatigue, fever, weight change  Psych: negative for - anxiety, depression, irritability or mood swings  ENT: negative for - headaches, hearing change, nasal congestion, oral lesions, sneezing or sore throat  Heme/ Lymph: negative for - bleeding problems, bruising, pallor or swollen lymph nodes  Endo: negative for - hot flashes, polydipsia/polyuria or temperature intolerance  Resp: negative for - cough, shortness of breath or wheezing  CV: negative for - chest pain, edema or palpitations  GI: negative for - abdominal pain, change in bowel habits, constipation, diarrhea or nausea/vomiting  : negative for - dysuria, hematuria, incontinence, pelvic pain or vulvar/vaginal symptoms  MSK: negative for - joint pain, joint swelling or muscle pain  Neuro: negative for - confusion, headaches, seizures or weakness  Derm: negative for - dry skin, hair changes, rash or skin lesion changes          Physical:   Vitals:   Vitals:    11/15/21 1058   BP: 132/84   Pulse: 75   Resp: 18   Temp: 97.1 °F (36.2 °C)   TempSrc: Temporal   SpO2: 100%   Weight: 155 lb 12.8 oz (70.7 kg)   Height: 5' 8\" (1.727 m)           Exam:   HEENT- atraumatic,normocephalic, awake, oriented, well nourished  Neck - supple,no enlarged lymph nodes, no JVD, no thyromegaly  Chest- CTA, no rhonchi, no crackles  Heart- rrr, no murmurs / gallop/rub  Abdomen- soft,BS+,NT, no hepatosplenomegaly  Ext - no c/c/edema   Neuro- no focal deficits. Power 5/5 all extremities  Skin - warm,dry, no obvious rashes. Review of Data:   LABS:   Lab Results   Component Value Date/Time    WBC 6.1 10/12/2021 03:31 PM    HGB 10.9 (L) 10/12/2021 03:31 PM    HCT 32.1 (L) 10/12/2021 03:31 PM    PLATELET 833 91/35/9059 03:31 PM     Lab Results   Component Value Date/Time    Sodium 138 10/12/2021 03:31 PM    Potassium 3.3 (L) 10/12/2021 03:31 PM    Chloride 103 10/12/2021 03:31 PM    CO2 32 10/12/2021 03:31 PM    Glucose 217 (H) 10/12/2021 03:31 PM    BUN 16 10/12/2021 03:31 PM    Creatinine 1.46 (H) 10/12/2021 03:31 PM     Lab Results   Component Value Date/Time    Cholesterol, total 410 (H) 04/01/2021 10:00 AM    HDL Cholesterol 66 04/01/2021 10:00 AM    LDL, calculated 282.6 (H) 04/01/2021 10:00 AM    Triglyceride 307 (H) 04/01/2021 10:00 AM     No components found for: GPT        Impression / Plan:        ICD-10-CM ICD-9-CM    1. Stage 3 chronic kidney disease, unspecified whether stage 3a or 3b CKD ([facility])  N18.30 585.3 REFERRAL TO NEPHROLOGY      METABOLIC PANEL, COMPREHENSIVE      US RETROPERITONEUM COMP   2. DM type 2, goal HbA1c < 7% ([facility])  E11.9 250.00 PROTEIN, URINE, 24 HR      REFERRAL TO NEPHROLOGY      insulin glargine (Lantus Solostar U-100 Insulin) 100 unit/mL (3 mL) inpn      NM GASTRIC EMPTY STDY   3. Vitamin D deficiency  E55.9 268.9 VITAMIN D, 25 HYDROXY   4. Essential hypertension  I10 401.9    5. Anemia, unspecified type  D64.9 285.9    6. Proteinuria, unspecified type  R80.9 791.0 PROTEIN, URINE, 24 HR      REFERRAL TO NEPHROLOGY   7. Hypercholesteremia  E78.00 272.0 LIPID PANEL   8.  Gastroparesis due to DM (HCC)  E11.43 250.60 NM GASTRIC EMPTY STDY    K31.84 536.3    Diabetes uncontrolled  Asked patient to increase Lantus to 30 units daily. Make appointment with endocrinologist  Neck swelling could be multifactorial-chronic kidney disease, mild anemia, proteinuria, and medication related such as Lyrica. Hypertension-blood pressure better today. Explained to patient risk benefits of the medications. Advised patient to stop meds if having any side effects. Pt verbalized understanding of the instructions. I have discussed the diagnosis with the patient and the intended plan as seen in the above orders. The patient has received an after-visit summary and questions were answered concerning future plans. I have discussed medication side effects and warnings with the patient as well. I have reviewed the plan of care with the patient, accepted their input and they are in agreement with the treatment goals. Reviewed plan of care. Patient has provided input and agrees with goals. Follow-up and Dispositions    · Return in about 3 months (around 2/15/2022).          Ericka Rudd MD

## 2021-11-15 NOTE — PROGRESS NOTES
Chief Complaint   Patient presents with    Leg Swelling        Visit Vitals  /84 (BP 1 Location: Right upper arm, BP Patient Position: Sitting)   Pulse 75   Temp 97.1 °F (36.2 °C) (Temporal)   Resp 18   Ht 5' 8\" (1.727 m)   Wt 155 lb 12.8 oz (70.7 kg)   SpO2 100%   BMI 23.69 kg/m²        1. Have you been to the ER, urgent care clinic since your last visit? Hospitalized since your last visit? No    2. Have you seen or consulted any other health care providers outside of the [address] [place] [address] since your last visit? Include any pap smears or colon screening.  No

## 2021-11-17 DIAGNOSIS — R74.8 ELEVATED ALKALINE PHOSPHATASE LEVEL: Primary | ICD-10-CM

## 2021-11-22 ENCOUNTER — HOSPITAL ENCOUNTER (OUTPATIENT)
Dept: NUCLEAR MEDICINE | Age: 41
Discharge: HOME | End: 2021-11-22
Attending: INTERNAL MEDICINE
Payer: COMMERCIAL

## 2021-11-22 ENCOUNTER — HOSPITAL ENCOUNTER (OUTPATIENT)
Dept: ULTRASOUND IMAGING | Age: 41
Discharge: HOME | End: 2021-11-22
Attending: INTERNAL MEDICINE
Payer: COMMERCIAL

## 2021-11-22 DIAGNOSIS — K31.84 GASTROPARESIS DUE TO DM (HCC): ICD-10-CM

## 2021-11-22 DIAGNOSIS — E11.43 GASTROPARESIS DUE TO DM (HCC): ICD-10-CM

## 2021-11-22 DIAGNOSIS — E11.9 DM TYPE 2, GOAL HBA1C < 7% (HCC): ICD-10-CM

## 2021-11-22 DIAGNOSIS — N18.30 STAGE 3 CHRONIC KIDNEY DISEASE, UNSPECIFIED WHETHER STAGE 3A OR 3B CKD (HCC): ICD-10-CM

## 2021-11-22 PROCEDURE — A9541 TC99M SULFUR COLLOID: HCPCS

## 2021-11-22 PROCEDURE — 76770 US EXAM ABDO BACK WALL COMP: CPT

## 2021-11-22 RX ORDER — TECHNETIUM TC 99M SULFUR COLLOID 2 MG
0.5 KIT MISCELLANEOUS
Status: COMPLETED | OUTPATIENT
Start: 2021-11-22 | End: 2021-11-22

## 2021-11-22 RX ADMIN — TECHNETIUM TC 99M SULFUR COLLOID 0.5 MILLICURIE: KIT at 13:00

## 2021-11-26 DIAGNOSIS — N20.0 KIDNEY STONE: Primary | ICD-10-CM

## 2021-11-26 NOTE — PROGRESS NOTES
Renal ultrasound shows mild hydronephrosis on the right side and a kidney stone on the left.   I am referring you to urology

## 2021-12-02 ENCOUNTER — HOSPITAL ENCOUNTER (INPATIENT)
Age: 41
LOS: 4 days | Discharge: HOME | DRG: 420 | End: 2021-12-06
Attending: EMERGENCY MEDICINE | Admitting: HOSPITALIST
Payer: COMMERCIAL

## 2021-12-02 ENCOUNTER — APPOINTMENT (OUTPATIENT)
Dept: CT IMAGING | Age: 41
DRG: 420 | End: 2021-12-02
Attending: EMERGENCY MEDICINE
Payer: COMMERCIAL

## 2021-12-02 ENCOUNTER — APPOINTMENT (OUTPATIENT)
Dept: GENERAL RADIOLOGY | Age: 41
DRG: 420 | End: 2021-12-02
Attending: EMERGENCY MEDICINE
Payer: COMMERCIAL

## 2021-12-02 DIAGNOSIS — E78.5 HYPERLIPIDEMIA, UNSPECIFIED HYPERLIPIDEMIA TYPE: ICD-10-CM

## 2021-12-02 DIAGNOSIS — R65.10 SIRS (SYSTEMIC INFLAMMATORY RESPONSE SYNDROME) (HCC): ICD-10-CM

## 2021-12-02 DIAGNOSIS — E08.10 DIABETIC KETOACIDOSIS WITHOUT COMA ASSOCIATED WITH DIABETES MELLITUS DUE TO UNDERLYING CONDITION (HCC): Primary | ICD-10-CM

## 2021-12-02 PROBLEM — E11.10 DKA (DIABETIC KETOACIDOSIS) (HCC): Status: ACTIVE | Noted: 2021-12-02

## 2021-12-02 LAB
ADMINISTERED INITIALS, ADMINIT: NORMAL
ALBUMIN SERPL-MCNC: 2.6 G/DL (ref 3.5–5)
ALBUMIN/GLOB SERPL: 0.7 {RATIO} (ref 1.1–2.2)
ALP SERPL-CCNC: 225 U/L (ref 45–117)
ALT SERPL-CCNC: 42 U/L (ref 12–78)
AMPHET UR QL SCN: NEGATIVE
ANION GAP SERPL CALC-SCNC: 21 MMOL/L (ref 5–15)
ANION GAP SERPL CALC-SCNC: 29 MMOL/L (ref 5–15)
ANION GAP SERPL CALC-SCNC: 35 MMOL/L (ref 5–15)
ANION GAP SERPL CALC-SCNC: 40 MMOL/L (ref 5–15)
APPEARANCE UR: ABNORMAL
ARTERIAL PATENCY WRIST A: ABNORMAL
AST SERPL-CCNC: 12 U/L (ref 15–37)
ATRIAL RATE: 89 BPM
BACTERIA URNS QL MICRO: ABNORMAL /HPF
BARBITURATES UR QL SCN: NEGATIVE
BASE DEFICIT BLD-SCNC: 25.2 MMOL/L
BASOPHILS # BLD: 0.1 K/UL (ref 0–0.1)
BASOPHILS NFR BLD: 1 % (ref 0–1)
BDY SITE: ABNORMAL
BENZODIAZ UR QL: NEGATIVE
BILIRUB SERPL-MCNC: 0.6 MG/DL (ref 0.2–1)
BILIRUB UR QL: NEGATIVE
BUN SERPL-MCNC: 67 MG/DL (ref 6–20)
BUN SERPL-MCNC: 70 MG/DL (ref 6–20)
BUN SERPL-MCNC: 75 MG/DL (ref 6–20)
BUN SERPL-MCNC: 76 MG/DL (ref 6–20)
BUN/CREAT SERPL: 19 (ref 12–20)
BUN/CREAT SERPL: 20 (ref 12–20)
BUN/CREAT SERPL: 20 (ref 12–20)
BUN/CREAT SERPL: 21 (ref 12–20)
CA-I BLD-MCNC: 0.98 MMOL/L (ref 1.12–1.32)
CALCIUM SERPL-MCNC: 7.1 MG/DL (ref 8.5–10.1)
CALCIUM SERPL-MCNC: 7.6 MG/DL (ref 8.5–10.1)
CALCIUM SERPL-MCNC: 7.9 MG/DL (ref 8.5–10.1)
CALCIUM SERPL-MCNC: 9 MG/DL (ref 8.5–10.1)
CALCULATED P AXIS, ECG09: 86 DEGREES
CALCULATED R AXIS, ECG10: 108 DEGREES
CALCULATED T AXIS, ECG11: 69 DEGREES
CANNABINOIDS UR QL SCN: POSITIVE
CHLORIDE BLD-SCNC: 99 MMOL/L (ref 100–108)
CHLORIDE SERPL-SCNC: 102 MMOL/L (ref 97–108)
CHLORIDE SERPL-SCNC: 82 MMOL/L (ref 97–108)
CHLORIDE SERPL-SCNC: 92 MMOL/L (ref 97–108)
CHLORIDE SERPL-SCNC: 98 MMOL/L (ref 97–108)
CO2 BLD-SCNC: 6 MMOL/L (ref 19–24)
CO2 SERPL-SCNC: 10 MMOL/L (ref 21–32)
CO2 SERPL-SCNC: 17 MMOL/L (ref 21–32)
CO2 SERPL-SCNC: 6 MMOL/L (ref 21–32)
CO2 SERPL-SCNC: 7 MMOL/L (ref 21–32)
COCAINE UR QL SCN: POSITIVE
COLOR UR: ABNORMAL
COMMENT, HOLDF: NORMAL
COVID-19 RAPID TEST, COVR: NOT DETECTED
CREAT SERPL-MCNC: 3.45 MG/DL (ref 0.7–1.3)
CREAT SERPL-MCNC: 3.47 MG/DL (ref 0.7–1.3)
CREAT SERPL-MCNC: 3.55 MG/DL (ref 0.7–1.3)
CREAT SERPL-MCNC: 3.81 MG/DL (ref 0.7–1.3)
CREAT UR-MCNC: 3 MG/DL (ref 0.6–1.3)
D50 ADMINISTERED, D50ADM: 0 ML
D50 ORDER, D50ORD: 0 ML
DIAGNOSIS, 93000: NORMAL
DIFFERENTIAL METHOD BLD: ABNORMAL
DRUG SCRN COMMENT,DRGCM: ABNORMAL
EOSINOPHIL # BLD: 0 K/UL (ref 0–0.4)
EOSINOPHIL NFR BLD: 0 % (ref 0–7)
EPITH CASTS URNS QL MICRO: ABNORMAL /LPF
ERYTHROCYTE [DISTWIDTH] IN BLOOD BY AUTOMATED COUNT: 14 % (ref 11.5–14.5)
EST. AVERAGE GLUCOSE BLD GHB EST-MCNC: 306 MG/DL
ETHANOL SERPL-MCNC: <10 MG/DL
GLOBULIN SER CALC-MCNC: 3.9 G/DL (ref 2–4)
GLSCOM COMMENTS: NORMAL
GLUCOSE BLD STRIP.AUTO-MCNC: 231 MG/DL (ref 65–117)
GLUCOSE BLD STRIP.AUTO-MCNC: 250 MG/DL (ref 65–117)
GLUCOSE BLD STRIP.AUTO-MCNC: 269 MG/DL (ref 65–117)
GLUCOSE BLD STRIP.AUTO-MCNC: 406 MG/DL (ref 65–117)
GLUCOSE BLD STRIP.AUTO-MCNC: 416 MG/DL (ref 65–117)
GLUCOSE BLD STRIP.AUTO-MCNC: 511 MG/DL (ref 65–117)
GLUCOSE BLD STRIP.AUTO-MCNC: >600 MG/DL (ref 65–117)
GLUCOSE BLD STRIP.AUTO-MCNC: >700 MG/DL (ref 74–106)
GLUCOSE SERPL-MCNC: 1045 MG/DL (ref 65–100)
GLUCOSE SERPL-MCNC: 1098 MG/DL (ref 65–100)
GLUCOSE SERPL-MCNC: 421 MG/DL (ref 65–100)
GLUCOSE SERPL-MCNC: 695 MG/DL (ref 65–100)
GLUCOSE UR STRIP.AUTO-MCNC: >1000 MG/DL
GLUCOSE, GLC: 231 MG/DL
GLUCOSE, GLC: 250 MG/DL
GLUCOSE, GLC: 269 MG/DL
GLUCOSE, GLC: 406 MG/DL
GLUCOSE, GLC: 416 MG/DL
GLUCOSE, GLC: 511 MG/DL
GLUCOSE, GLC: 601 MG/DL
GRAN CASTS URNS QL MICRO: ABNORMAL /LPF
HBA1C MFR BLD: 12.3 % (ref 4–5.6)
HCO3 BLDA-SCNC: 5 MMOL/L
HCT VFR BLD AUTO: 35.1 % (ref 36.6–50.3)
HGB BLD-MCNC: 10.9 G/DL (ref 12.1–17)
HGB UR QL STRIP: ABNORMAL
HIGH TARGET, HITG: 250 MG/DL
HYALINE CASTS URNS QL MICRO: ABNORMAL /LPF (ref 0–5)
IMM GRANULOCYTES # BLD AUTO: 0.3 K/UL (ref 0–0.04)
IMM GRANULOCYTES NFR BLD AUTO: 2 % (ref 0–0.5)
INSULIN ADMINSTERED, INSADM: 10.8 UNITS/HOUR
INSULIN ADMINSTERED, INSADM: 16.2 UNITS/HOUR
INSULIN ADMINSTERED, INSADM: 17.1 UNITS/HOUR
INSULIN ADMINSTERED, INSADM: 19 UNITS/HOUR
INSULIN ADMINSTERED, INSADM: 20.9 UNITS/HOUR
INSULIN ADMINSTERED, INSADM: 21.6 UNITS/HOUR
INSULIN ADMINSTERED, INSADM: 27.1 UNITS/HOUR
INSULIN ADMINSTERED, INSADM: 32 UNITS/HOUR
INSULIN ADMINSTERED, INSADM: 32.5 UNITS/HOUR
INSULIN ADMINSTERED, INSADM: 34.6 UNITS/HOUR
INSULIN ADMINSTERED, INSADM: 36.1 UNITS/HOUR
INSULIN ADMINSTERED, INSADM: 37.9 UNITS/HOUR
INSULIN ORDER, INSORD: 10.8 UNITS/HOUR
INSULIN ORDER, INSORD: 16.2 UNITS/HOUR
INSULIN ORDER, INSORD: 17.1 UNITS/HOUR
INSULIN ORDER, INSORD: 19 UNITS/HOUR
INSULIN ORDER, INSORD: 20.9 UNITS/HOUR
INSULIN ORDER, INSORD: 21.6 UNITS/HOUR
INSULIN ORDER, INSORD: 27.1 UNITS/HOUR
INSULIN ORDER, INSORD: 32 UNITS/HOUR
INSULIN ORDER, INSORD: 32.5 UNITS/HOUR
INSULIN ORDER, INSORD: 34.6 UNITS/HOUR
INSULIN ORDER, INSORD: 36.1 UNITS/HOUR
INSULIN ORDER, INSORD: 37.9 UNITS/HOUR
KETONES UR QL STRIP.AUTO: 40 MG/DL
LACTATE BLD-SCNC: 3.98 MMOL/L (ref 0.4–2)
LACTATE BLD-SCNC: 4.63 MMOL/L (ref 0.4–2)
LACTATE SERPL-SCNC: 2.9 MMOL/L (ref 0.4–2)
LACTATE SERPL-SCNC: 3.3 MMOL/L (ref 0.4–2)
LEUKOCYTE ESTERASE UR QL STRIP.AUTO: NEGATIVE
LOW TARGET, LOT: 150 MG/DL
LYMPHOCYTES # BLD: 2.1 K/UL (ref 0.8–3.5)
LYMPHOCYTES NFR BLD: 11 % (ref 12–49)
MAGNESIUM SERPL-MCNC: 2.5 MG/DL (ref 1.6–2.4)
MAGNESIUM SERPL-MCNC: 2.5 MG/DL (ref 1.6–2.4)
MAGNESIUM SERPL-MCNC: 3 MG/DL (ref 1.6–2.4)
MAGNESIUM SERPL-MCNC: 3.3 MG/DL (ref 1.6–2.4)
MCH RBC QN AUTO: 30.4 PG (ref 26–34)
MCHC RBC AUTO-ENTMCNC: 31.1 G/DL (ref 30–36.5)
MCV RBC AUTO: 97.8 FL (ref 80–99)
METHADONE UR QL: NEGATIVE
MINUTES UNTIL NEXT BG, NBG: 60 MIN
MONOCYTES # BLD: 1 K/UL (ref 0–1)
MONOCYTES NFR BLD: 5 % (ref 5–13)
MUCOUS THREADS URNS QL MICRO: ABNORMAL /LPF
MULTIPLIER, MUL: 0.02
MULTIPLIER, MUL: 0.03
MULTIPLIER, MUL: 0.04
MULTIPLIER, MUL: 0.05
MULTIPLIER, MUL: 0.06
MULTIPLIER, MUL: 0.07
MULTIPLIER, MUL: 0.08
MULTIPLIER, MUL: 0.09
MULTIPLIER, MUL: 0.1
NEUTS SEG # BLD: 15.3 K/UL (ref 1.8–8)
NEUTS SEG NFR BLD: 81 % (ref 32–75)
NITRITE UR QL STRIP.AUTO: NEGATIVE
NRBC # BLD: 0 K/UL (ref 0–0.01)
NRBC BLD-RTO: 0 PER 100 WBC
OPIATES UR QL: NEGATIVE
ORDER INITIALS, ORDINIT: NORMAL
P-R INTERVAL, ECG05: 164 MS
PCO2 BLDA: <17 MMHG (ref 35–45)
PCO2 BLDV: 21 MMHG (ref 41–51)
PCP UR QL: NEGATIVE
PH BLDA: 7.15 [PH] (ref 7.35–7.45)
PH BLDV: 6.97 [PH] (ref 7.32–7.42)
PH UR STRIP: 5 [PH] (ref 5–8)
PHOSPHATE SERPL-MCNC: 11 MG/DL (ref 2.6–4.7)
PLATELET # BLD AUTO: 391 K/UL (ref 150–400)
PMV BLD AUTO: 11.7 FL (ref 8.9–12.9)
PO2 BLDA: 139 MMHG (ref 80–100)
PO2 BLDV: 49 MMHG (ref 25–40)
POTASSIUM BLD-SCNC: 5.4 MMOL/L (ref 3.5–5.5)
POTASSIUM SERPL-SCNC: 2.8 MMOL/L (ref 3.5–5.1)
POTASSIUM SERPL-SCNC: 3.5 MMOL/L (ref 3.5–5.1)
POTASSIUM SERPL-SCNC: 4.3 MMOL/L (ref 3.5–5.1)
POTASSIUM SERPL-SCNC: 5.7 MMOL/L (ref 3.5–5.1)
PROT SERPL-MCNC: 6.5 G/DL (ref 6.4–8.2)
PROT UR STRIP-MCNC: 300 MG/DL
Q-T INTERVAL, ECG07: 412 MS
QRS DURATION, ECG06: 104 MS
QTC CALCULATION (BEZET), ECG08: 501 MS
RBC # BLD AUTO: 3.59 M/UL (ref 4.1–5.7)
RBC #/AREA URNS HPF: ABNORMAL /HPF (ref 0–5)
SAMPLES BEING HELD,HOLD: NORMAL
SAO2% DEVICE SAO2% SENSOR NAME: ABNORMAL
SERVICE CMNT-IMP: ABNORMAL
SODIUM BLD-SCNC: 125 MMOL/L (ref 136–145)
SODIUM SERPL-SCNC: 128 MMOL/L (ref 136–145)
SODIUM SERPL-SCNC: 134 MMOL/L (ref 136–145)
SODIUM SERPL-SCNC: 137 MMOL/L (ref 136–145)
SODIUM SERPL-SCNC: 140 MMOL/L (ref 136–145)
SOURCE, COVRS: NORMAL
SP GR UR REFRACTOMETRY: 1.02 (ref 1–1.03)
SPECIMEN SITE: ABNORMAL
SPECIMEN SITE: ABNORMAL
TROPONIN-HIGH SENSITIVITY: 40 NG/L (ref 0–76)
UA: UC IF INDICATED,UAUC: ABNORMAL
UROBILINOGEN UR QL STRIP.AUTO: 0.2 EU/DL (ref 0.2–1)
VENTRICULAR RATE, ECG03: 89 BPM
WBC # BLD AUTO: 18.8 K/UL (ref 4.1–11.1)
WBC URNS QL MICRO: ABNORMAL /HPF (ref 0–4)

## 2021-12-02 PROCEDURE — 96365 THER/PROPH/DIAG IV INF INIT: CPT

## 2021-12-02 PROCEDURE — 2709999900 HC NON-CHARGEABLE SUPPLY

## 2021-12-02 PROCEDURE — 65610000006 HC RM INTENSIVE CARE

## 2021-12-02 PROCEDURE — 74011636637 HC RX REV CODE- 636/637: Performed by: EMERGENCY MEDICINE

## 2021-12-02 PROCEDURE — 83735 ASSAY OF MAGNESIUM: CPT

## 2021-12-02 PROCEDURE — 36415 COLL VENOUS BLD VENIPUNCTURE: CPT

## 2021-12-02 PROCEDURE — 74011250637 HC RX REV CODE- 250/637: Performed by: HOSPITALIST

## 2021-12-02 PROCEDURE — 74011000250 HC RX REV CODE- 250: Performed by: HOSPITALIST

## 2021-12-02 PROCEDURE — 85025 COMPLETE CBC W/AUTO DIFF WBC: CPT

## 2021-12-02 PROCEDURE — 82803 BLOOD GASES ANY COMBINATION: CPT

## 2021-12-02 PROCEDURE — 82962 GLUCOSE BLOOD TEST: CPT

## 2021-12-02 PROCEDURE — 83036 HEMOGLOBIN GLYCOSYLATED A1C: CPT

## 2021-12-02 PROCEDURE — 84100 ASSAY OF PHOSPHORUS: CPT

## 2021-12-02 PROCEDURE — 74011000250 HC RX REV CODE- 250: Performed by: EMERGENCY MEDICINE

## 2021-12-02 PROCEDURE — 96368 THER/DIAG CONCURRENT INF: CPT

## 2021-12-02 PROCEDURE — 80053 COMPREHEN METABOLIC PANEL: CPT

## 2021-12-02 PROCEDURE — 82077 ASSAY SPEC XCP UR&BREATH IA: CPT

## 2021-12-02 PROCEDURE — 74011250636 HC RX REV CODE- 250/636: Performed by: EMERGENCY MEDICINE

## 2021-12-02 PROCEDURE — 81001 URINALYSIS AUTO W/SCOPE: CPT

## 2021-12-02 PROCEDURE — 93005 ELECTROCARDIOGRAM TRACING: CPT

## 2021-12-02 PROCEDURE — 80307 DRUG TEST PRSMV CHEM ANLYZR: CPT

## 2021-12-02 PROCEDURE — 99233 SBSQ HOSP IP/OBS HIGH 50: CPT | Performed by: CLINICAL NURSE SPECIALIST

## 2021-12-02 PROCEDURE — 74176 CT ABD & PELVIS W/O CONTRAST: CPT

## 2021-12-02 PROCEDURE — 87635 SARS-COV-2 COVID-19 AMP PRB: CPT

## 2021-12-02 PROCEDURE — 71045 X-RAY EXAM CHEST 1 VIEW: CPT

## 2021-12-02 PROCEDURE — 74011000258 HC RX REV CODE- 258: Performed by: EMERGENCY MEDICINE

## 2021-12-02 PROCEDURE — 96366 THER/PROPH/DIAG IV INF ADDON: CPT

## 2021-12-02 PROCEDURE — 83605 ASSAY OF LACTIC ACID: CPT

## 2021-12-02 PROCEDURE — 84484 ASSAY OF TROPONIN QUANT: CPT

## 2021-12-02 PROCEDURE — 80048 BASIC METABOLIC PNL TOTAL CA: CPT

## 2021-12-02 PROCEDURE — 99285 EMERGENCY DEPT VISIT HI MDM: CPT

## 2021-12-02 PROCEDURE — 96374 THER/PROPH/DIAG INJ IV PUSH: CPT

## 2021-12-02 PROCEDURE — 84295 ASSAY OF SERUM SODIUM: CPT

## 2021-12-02 PROCEDURE — 75810000455 HC PLCMT CENT VENOUS CATH LVL 2 5182

## 2021-12-02 PROCEDURE — 36600 WITHDRAWAL OF ARTERIAL BLOOD: CPT

## 2021-12-02 PROCEDURE — 87040 BLOOD CULTURE FOR BACTERIA: CPT

## 2021-12-02 PROCEDURE — 96361 HYDRATE IV INFUSION ADD-ON: CPT

## 2021-12-02 PROCEDURE — 74011250636 HC RX REV CODE- 250/636: Performed by: HOSPITALIST

## 2021-12-02 RX ORDER — SODIUM BICARBONATE 1 MEQ/ML
50 SYRINGE (ML) INTRAVENOUS ONCE
Status: COMPLETED | OUTPATIENT
Start: 2021-12-02 | End: 2021-12-02

## 2021-12-02 RX ORDER — HEPARIN SODIUM 5000 [USP'U]/ML
5000 INJECTION, SOLUTION INTRAVENOUS; SUBCUTANEOUS EVERY 8 HOURS
Status: DISCONTINUED | OUTPATIENT
Start: 2021-12-02 | End: 2021-12-06 | Stop reason: HOSPADM

## 2021-12-02 RX ORDER — POTASSIUM CHLORIDE 29.8 MG/ML
20 INJECTION INTRAVENOUS
Status: COMPLETED | OUTPATIENT
Start: 2021-12-02 | End: 2021-12-02

## 2021-12-02 RX ORDER — POLYETHYLENE GLYCOL 3350 17 G/17G
17 POWDER, FOR SOLUTION ORAL DAILY PRN
Status: DISCONTINUED | OUTPATIENT
Start: 2021-12-02 | End: 2021-12-06 | Stop reason: HOSPADM

## 2021-12-02 RX ORDER — ONDANSETRON 4 MG/1
4 TABLET, ORALLY DISINTEGRATING ORAL
Status: DISCONTINUED | OUTPATIENT
Start: 2021-12-02 | End: 2021-12-06 | Stop reason: HOSPADM

## 2021-12-02 RX ORDER — INSULIN LISPRO 100 [IU]/ML
INJECTION, SOLUTION INTRAVENOUS; SUBCUTANEOUS
Status: DISCONTINUED | OUTPATIENT
Start: 2021-12-02 | End: 2021-12-02 | Stop reason: SDUPTHER

## 2021-12-02 RX ORDER — SODIUM CHLORIDE 0.9 % (FLUSH) 0.9 %
5-40 SYRINGE (ML) INJECTION EVERY 8 HOURS
Status: DISCONTINUED | OUTPATIENT
Start: 2021-12-02 | End: 2021-12-06 | Stop reason: HOSPADM

## 2021-12-02 RX ORDER — ONDANSETRON 2 MG/ML
4 INJECTION INTRAMUSCULAR; INTRAVENOUS
Status: DISCONTINUED | OUTPATIENT
Start: 2021-12-02 | End: 2021-12-06 | Stop reason: HOSPADM

## 2021-12-02 RX ORDER — MAGNESIUM SULFATE 100 %
4 CRYSTALS MISCELLANEOUS AS NEEDED
Status: DISCONTINUED | OUTPATIENT
Start: 2021-12-02 | End: 2021-12-02 | Stop reason: SDUPTHER

## 2021-12-02 RX ORDER — SODIUM CHLORIDE 0.9 % (FLUSH) 0.9 %
5-40 SYRINGE (ML) INJECTION AS NEEDED
Status: DISCONTINUED | OUTPATIENT
Start: 2021-12-02 | End: 2021-12-06 | Stop reason: HOSPADM

## 2021-12-02 RX ORDER — ACETAMINOPHEN 325 MG/1
650 TABLET ORAL
Status: DISCONTINUED | OUTPATIENT
Start: 2021-12-02 | End: 2021-12-06 | Stop reason: HOSPADM

## 2021-12-02 RX ORDER — ACETAMINOPHEN 650 MG/1
650 SUPPOSITORY RECTAL
Status: DISCONTINUED | OUTPATIENT
Start: 2021-12-02 | End: 2021-12-06 | Stop reason: HOSPADM

## 2021-12-02 RX ORDER — PANTOPRAZOLE SODIUM 40 MG/1
40 TABLET, DELAYED RELEASE ORAL DAILY
Status: DISCONTINUED | OUTPATIENT
Start: 2021-12-02 | End: 2021-12-06 | Stop reason: HOSPADM

## 2021-12-02 RX ORDER — PREGABALIN 25 MG/1
75 CAPSULE ORAL 2 TIMES DAILY
Status: DISCONTINUED | OUTPATIENT
Start: 2021-12-02 | End: 2021-12-06 | Stop reason: HOSPADM

## 2021-12-02 RX ORDER — INSULIN LISPRO 100 [IU]/ML
INJECTION, SOLUTION INTRAVENOUS; SUBCUTANEOUS
Status: DISCONTINUED | OUTPATIENT
Start: 2021-12-02 | End: 2021-12-03

## 2021-12-02 RX ORDER — VANCOMYCIN/0.9 % SOD CHLORIDE 1.5G/250ML
1500 PLASTIC BAG, INJECTION (ML) INTRAVENOUS ONCE
Status: COMPLETED | OUTPATIENT
Start: 2021-12-02 | End: 2021-12-02

## 2021-12-02 RX ORDER — LORAZEPAM 2 MG/ML
INJECTION INTRAMUSCULAR
Status: DISPENSED
Start: 2021-12-02 | End: 2021-12-02

## 2021-12-02 RX ORDER — MAGNESIUM SULFATE 100 %
4 CRYSTALS MISCELLANEOUS AS NEEDED
Status: DISCONTINUED | OUTPATIENT
Start: 2021-12-02 | End: 2021-12-03

## 2021-12-02 RX ORDER — NOREPINEPHRINE BITARTRATE/D5W 8 MG/250ML
.5-16 PLASTIC BAG, INJECTION (ML) INTRAVENOUS
Status: DISCONTINUED | OUTPATIENT
Start: 2021-12-02 | End: 2021-12-05

## 2021-12-02 RX ORDER — SODIUM CHLORIDE 0.9 % (FLUSH) 0.9 %
5-10 SYRINGE (ML) INJECTION AS NEEDED
Status: DISCONTINUED | OUTPATIENT
Start: 2021-12-02 | End: 2021-12-06 | Stop reason: HOSPADM

## 2021-12-02 RX ORDER — LEVOFLOXACIN 5 MG/ML
750 INJECTION, SOLUTION INTRAVENOUS
Status: COMPLETED | OUTPATIENT
Start: 2021-12-02 | End: 2021-12-02

## 2021-12-02 RX ORDER — SODIUM CHLORIDE 450 MG/100ML
150 INJECTION, SOLUTION INTRAVENOUS CONTINUOUS
Status: DISCONTINUED | OUTPATIENT
Start: 2021-12-02 | End: 2021-12-02

## 2021-12-02 RX ORDER — DEXTROSE 50 % IN WATER (D50W) INTRAVENOUS SYRINGE
25-50 AS NEEDED
Status: DISCONTINUED | OUTPATIENT
Start: 2021-12-02 | End: 2021-12-02 | Stop reason: SDUPTHER

## 2021-12-02 RX ORDER — ROSUVASTATIN CALCIUM 40 MG/1
40 TABLET, COATED ORAL
Status: DISCONTINUED | OUTPATIENT
Start: 2021-12-02 | End: 2021-12-06 | Stop reason: HOSPADM

## 2021-12-02 RX ORDER — DEXTROSE 50 % IN WATER (D50W) INTRAVENOUS SYRINGE
25-50 AS NEEDED
Status: DISCONTINUED | OUTPATIENT
Start: 2021-12-02 | End: 2021-12-03

## 2021-12-02 RX ADMIN — NOREPINEPHRINE BITARTRATE 7 MCG/MIN: 1 INJECTION, SOLUTION, CONCENTRATE INTRAVENOUS at 11:37

## 2021-12-02 RX ADMIN — HEPARIN SODIUM 5000 UNITS: 5000 INJECTION INTRAVENOUS; SUBCUTANEOUS at 14:31

## 2021-12-02 RX ADMIN — Medication 1 AMPULE: at 14:26

## 2021-12-02 RX ADMIN — SODIUM CHLORIDE 150 ML/HR: 4.5 INJECTION, SOLUTION INTRAVENOUS at 13:50

## 2021-12-02 RX ADMIN — VANCOMYCIN HYDROCHLORIDE 1500 MG: 10 INJECTION, POWDER, LYOPHILIZED, FOR SOLUTION INTRAVENOUS at 08:51

## 2021-12-02 RX ADMIN — NOREPINEPHRINE BITARTRATE 5.33 MCG/MIN: 1 INJECTION, SOLUTION, CONCENTRATE INTRAVENOUS at 10:58

## 2021-12-02 RX ADMIN — SODIUM CHLORIDE 1000 ML: 9 INJECTION, SOLUTION INTRAVENOUS at 11:38

## 2021-12-02 RX ADMIN — POTASSIUM CHLORIDE 20 MEQ: 400 INJECTION, SOLUTION INTRAVENOUS at 20:51

## 2021-12-02 RX ADMIN — Medication 10 ML: at 22:45

## 2021-12-02 RX ADMIN — SODIUM CHLORIDE 1000 ML: 9 INJECTION, SOLUTION INTRAVENOUS at 08:48

## 2021-12-02 RX ADMIN — PANTOPRAZOLE SODIUM 40 MG: 40 TABLET, DELAYED RELEASE ORAL at 14:31

## 2021-12-02 RX ADMIN — SODIUM BICARBONATE 50 MEQ: 84 INJECTION, SOLUTION INTRAVENOUS at 10:39

## 2021-12-02 RX ADMIN — Medication 10 ML: at 15:22

## 2021-12-02 RX ADMIN — PREGABALIN 75 MG: 25 CAPSULE ORAL at 14:31

## 2021-12-02 RX ADMIN — SODIUM CHLORIDE 19 UNITS/HR: 9 INJECTION, SOLUTION INTRAVENOUS at 23:46

## 2021-12-02 RX ADMIN — POTASSIUM CHLORIDE: 149 INJECTION, SOLUTION, CONCENTRATE INTRAVENOUS at 20:45

## 2021-12-02 RX ADMIN — LEVOFLOXACIN 750 MG: 5 INJECTION, SOLUTION INTRAVENOUS at 08:47

## 2021-12-02 RX ADMIN — POTASSIUM CHLORIDE 20 MEQ: 400 INJECTION, SOLUTION INTRAVENOUS at 19:55

## 2021-12-02 RX ADMIN — SODIUM CHLORIDE 1000 ML: 9 INJECTION, SOLUTION INTRAVENOUS at 10:37

## 2021-12-02 RX ADMIN — SODIUM BICARBONATE 50 MEQ: 84 INJECTION, SOLUTION INTRAVENOUS at 10:44

## 2021-12-02 RX ADMIN — SODIUM CHLORIDE 10.8 UNITS/HR: 9 INJECTION, SOLUTION INTRAVENOUS at 12:07

## 2021-12-02 RX ADMIN — SODIUM CHLORIDE 32 UNITS/HR: 9 INJECTION, SOLUTION INTRAVENOUS at 19:58

## 2021-12-02 RX ADMIN — SODIUM CHLORIDE 1000 ML: 9 INJECTION, SOLUTION INTRAVENOUS at 08:20

## 2021-12-02 RX ADMIN — SODIUM CHLORIDE 121 ML: 9 INJECTION, SOLUTION INTRAVENOUS at 10:37

## 2021-12-02 RX ADMIN — SODIUM CHLORIDE 37.9 UNITS/HR: 9 INJECTION, SOLUTION INTRAVENOUS at 17:24

## 2021-12-02 RX ADMIN — NOREPINEPHRINE BITARTRATE 6 MCG/MIN: 1 INJECTION, SOLUTION, CONCENTRATE INTRAVENOUS at 11:25

## 2021-12-02 RX ADMIN — HUMAN INSULIN 10 UNITS: 100 INJECTION, SOLUTION SUBCUTANEOUS at 10:39

## 2021-12-02 RX ADMIN — ROSUVASTATIN CALCIUM 40 MG: 40 TABLET, FILM COATED ORAL at 22:44

## 2021-12-02 RX ADMIN — HEPARIN SODIUM 5000 UNITS: 5000 INJECTION INTRAVENOUS; SUBCUTANEOUS at 22:44

## 2021-12-02 RX ADMIN — CEFEPIME HYDROCHLORIDE 2 G: 2 INJECTION, POWDER, FOR SOLUTION INTRAVENOUS at 08:46

## 2021-12-02 RX ADMIN — ONDANSETRON 4 MG: 2 INJECTION INTRAMUSCULAR; INTRAVENOUS at 18:03

## 2021-12-02 RX ADMIN — PREGABALIN 75 MG: 25 CAPSULE ORAL at 17:59

## 2021-12-02 RX ADMIN — Medication 1 AMPULE: at 20:45

## 2021-12-02 NOTE — REMOTE MONITORING
Attempted to contact primary RN regarding this patient. Left message with [facility] regarding 6 hour Sepsis bundle.     Vladimir Walsh RN BSN- orienting with [address]  Callback # [phone]

## 2021-12-02 NOTE — PROGRESS NOTES
Transition of Care Plan:    RUR: 20%  Disposition: Home with family assistance  Follow up appointments:  PCP, Specialists  DME needed:none identified  Transportation at Discharge: other relative, Nicole Saint or means to access home:   yes     IM Medicare Letter: n/a  Is patient a BCPI-A Bundle:    n/a       If yes, was Bundle Letter given?:    Is patient a [facility] and connected with the [facility]?n/a  If yes, was [facility] transfer form completed and [facility] notified? Caregiver:  Lorena Cabrera  [phone]     Discharge Caregiver contacted prior to discharge? CM will contact prior to d/c if pt desires. Reason for Admission:   DKA                 RUR Score:  20% high         PCP: First and Last name:  Cheri Kerr MD     Name of Practice:   Are you a current patient: Yes/No: yes   Approximate date of last visit: a couple weeks ago   Can you do a virtual visit with your PCP:              Resources/supports as identified by patient/family:  Pt has a supportive mother, grandmother, and friend. Top Challenges facing patient (as identified by patient/family and CM): Finances/Medication cost?     Pt is insured by Medicaid. Transportation? Ms. Hiral Baxter will transport at d/c. Support system or lack thereof? Pt has an adequate support system. Living arrangements? Pt lives with his grandmother per Ms. Tan. Self-care/ADLs/Cognition? Pt is alert and oriented x4 at baseline and can perform ADL care.            Current Advanced Directive/Advance Care Plan:  Full Code          Primary Decision Maker: Jaleesa Shafer - Mother - [phone]    Payor Source Payor: [facility] / Plan: [address] / Product Type: Managed Care Medicaid /                             Plan for utilizing home health:   As needed                  Transition of Care Plan:    Home with family assistance    CM attempted to reach pt's mother; however, phone was disconnected. CM spoke with pt's other relative, Mauri Zapata, to introduce self/role, verify demographics, insurance and PCP. CM also discussed d/c plan. Pt is a 45 yo, , single male who was admitted to [facility] today with a dx of DKA. Pt reportedly does not see his PCP often. Pt uses the [email or web address] Pt lives with his grandmother in a one fl home with 2 ARIELLE. Pt has a supportive Mother and Aunt. Pt does not use any DME. Pt does drive. Pt can complete his ADL care. Pt does not have a hx of HH, SNF or IPR. Pt's relative, Mauri Zapata, can transport at d/c. CM will continue to assess for d/c needs. Care Management Interventions  PCP Verified by CM: Yes (Pt sees Dr. Mare Shepherd.)  Palliative Care Criteria Met (RRAT>21 & CHF Dx)?: No  Mode of Transport at Discharge:  Other (see comment) Mauri Zapata)  Transition of Care Consult (CM Consult): Discharge Planning  Discharge Durable Medical Equipment: No  Physical Therapy Consult: No  Occupational Therapy Consult: No  Speech Therapy Consult: No  Support Systems: Parent(s), Other Family Member(s)  Confirm Follow Up Transport: Self  Discharge Location  Discharge Placement: Home with family assistance    KAVEH Chapin  Care Management, [address]

## 2021-12-02 NOTE — ED NOTES
Pt arrives via EMS from home d/t sudden onset nausea/vomiting/diarrhea around 0300. Pt also reporting CP x 1 year w/ abdominal pain. Pt has hx of DM and HTN. Pt a/o x 3 but drowsy, tachypnic,  hypothermic at 95.1 w/ diarrhea. BG reading HI x 2. Pt placed on Doug Hugger    Difficulty obtaining IV access and blood work     0820 IV obtained but difficulty obtaining blood work. Tech at bedside with US     1045 Pt has displaced US line in R arm, MD aware of hypotension and inappropriate IV access    1049 MD at bedside to place femoral central line     1102 Verbal order received for CT abdomen/pelvis d/t abdominal distention Seizure activity noted, MD at bedside     1107 Timeout performed, procedure initiated     1150 Pt tolerated procedure well. Pt transported to CT. Attempted to call report to ICU - unable to take report at this time     [id number] Patient is being transferred to [facility], Room # [id number]  Report given to  RN on [facility] for routine progression of care. Report consisted of the following information SBAR, Kardex, ED Summary, MAR and Recent Results. Patient transferred to receiving unit by: RN  (RN or tech name). Outstanding consults needed: No     Next labs due: Yes    The following personal items will be sent with the patient during transfer to the floor:     All valuables:    Cardiac monitoring ordered: Yes    The following CURRENT information was reported to the receiving RN:    Code status: Full Code at time of transfer    Last set of vital signs:  Vital Signs  Level of Consciousness: Responds to Voice (1) (12/02/21 1155)  Temp: (!) 95.1 °F (35.1 °C) (12/02/21 0740)  Temp Source: Rectal (12/02/21 0740)  Pulse (Heart Rate): 86 (12/02/21 1209)  Heart Rate Source: Monitor (12/02/21 0732)  Resp Rate: (!) 31 (12/02/21 1155)  BP: (!) 121/47 (12/02/21 1215)  MAP (Monitor): (!) 50 (12/02/21 0948)  MAP (Calculated): 72 (12/02/21 1215)  BP 1 Location: Right upper arm (12/02/21 0732)  BP Patient Position: Lying left side (12/02/21 0732)         Oxygen Therapy  O2 Sat (%): 100 % (12/02/21 1155)  Pulse via Oximetry: 80 beats per minute (12/02/21 0948)  O2 Device: None (Room air) (12/02/21 0740)      Last pain assessment:  Pain 1  Pain Scale 1: Numeric (0 - 10)  Pain Intensity 1: 10  Patient Stated Pain Goal: 0  Pain Onset 1: 0300 am  Pain Location 1: Abdomen  Pain Orientation 1: Mid  Pain Intervention(s) 1: Therapeutic presence      Wounds: No     Urinary catheter: hansen  Is there a hansen order: Yes    LDAs:     Quad Lumen 12/02/21 Right Femoral (Active)   Central Line Being Utilized Yes 12/02/21 1130   Site Assessment Clean, dry, & intact 12/02/21 1130   Affected Extremity/Extremities Pulses palpable 12/02/21 1130   Dressing Status Clean, dry, & intact 12/02/21 1130      Peripheral IV 12/02/21 Left Antecubital (Active)         Opportunity for questions and clarification was provided.     Dulce Maria Foster RN

## 2021-12-02 NOTE — ED PROVIDER NOTES
EMERGENCY DEPARTMENT HISTORY AND PHYSICAL EXAM      Date: 12/2/2021  Patient Name: Moraima Alfaro    History of Presenting Illness     Chief Complaint   Patient presents with    Nausea     MD received report from EMS        History Provided By: Patient and EMS    HPI: Moraima Alfaro, 46 y.o. male presents to the ED with 2-day history of nausea and vomiting. Patient is a history of type 1 diabetes, medical noncompliance, bipolar disorder, noted to have blood sugars greater than 500 by EMS. On presentation, patient to provide very limited history due to degree of medical illness, was hypothermic, hypotensive and code sepsis care was activated for the patient. There are no other complaints, changes, or physical findings at this time. PCP: Mahesh Shine MD    No current facility-administered medications on file prior to encounter. Current Outpatient Medications on File Prior to Encounter   Medication Sig Dispense Refill    insulin glargine (Lantus Solostar U-100 Insulin) 100 unit/mL (3 mL) inpn INJECT 30 UNITS SUBCUTANEOUSLY DAILY 30 mL 4    pantoprazole (Protonix) 40 mg tablet Take 1 Tablet by mouth daily. 90 Tablet 1    ergocalciferol (ERGOCALCIFEROL) 1,250 mcg (50,000 unit) capsule Take 1 capsule by mouth once a week 12 Capsule 0    ondansetron (Zofran ODT) 4 mg disintegrating tablet Take 1 Tablet by mouth every eight (8) hours as needed for Nausea. 10 Tablet 0    naloxone (Narcan) 4 mg/actuation nasal spray Use 1 spray intranasally, then discard. Repeat with new spray every 2 min as needed for opioid overdose symptoms, alternating nostrils. 2 Each 0    docusate sodium (Colace) 100 mg capsule Take 100 mg by mouth two (2) times a day.  clotrimazole (LOTRIMIN) 1 % topical cream Apply  to affected area two (2) times a day.  insulin glargine (Lantus U-100 Insulin) 100 unit/mL injection 22 Units by SubCUTAneous route nightly.  5 mL 1    pregabalin (Lyrica) 75 mg capsule Take 75 mg by mouth two (2) times a day.  rosuvastatin (CRESTOR) 40 mg tablet Take 1 Tablet by mouth nightly. 30 Tablet 3    metoprolol tartrate (LOPRESSOR) 25 mg tablet Take 1 Tablet by mouth two (2) times a day. 60 Tablet 1    lisinopriL (PRINIVIL, ZESTRIL) 20 mg tablet Take 1 Tablet by mouth daily. 30 Tablet 3    furosemide (Lasix) 40 mg tablet Take 1 Tablet by mouth daily.  30 Tablet 1    insulin aspart U-100 (NovoLOG Flexpen U-100 Insulin) 100 unit/mL (3 mL) inpn (Novolog or Humalog, whichever more preferred: Inject 5 units with each meal + correction insulin, up to 30 units per day 10 Adjustable Dose Pre-filled Pen Syringe 3    sildenafil citrate (VIAGRA) 50 mg tablet TAKE 1 TABLET BY MOUTH AS NEEDED 10 Tab 0       Past History     Past Medical History:  Past Medical History:   Diagnosis Date    Bipolar 1 disorder, depressed ([facility] [address]     Bipolar disorder ([facility] [address] [facility]     Depression     Diabetes ([facility] [facility]     DKA, type 1 ([facility] [address] 2013    diagnosed age 23    H/O noncompliance with medical treatment, presenting hazards to health     MRSA (methicillin resistant staph aureus) culture positive     MRSA (methicillin resistant Staphylococcus aureus)     Face    Noncompliance with medication regimen     Second hand smoke exposure     Seizure ([address]     Seizures ([facility] [address]  or     one episode during prison       Past Surgical History:  Past Surgical History:   Procedure Laterality Date    HX HEENT      top left wisdom tooth    HX ORTHOPAEDIC Left     wrist; MCV    UPPER GI ENDOSCOPY,BIOPSY  2018            Family History:  Family History   Problem Relation Age of Onset    Diabetes Mother     Diabetes Other         neice, type 1        Social History:  Social History     Tobacco Use    Smoking status: Former Smoker     Packs/day: 0.10     Years: 16.00     Pack years: 1.60     Types: Cigarettes     Start date: 10/4/1999     Quit date: 2020     Years since quittin.7    Smokeless tobacco: Never Used Substance Use Topics    Alcohol use: No    Drug use: No       Allergies:  No Known Allergies      Review of Systems   Review of Systems   Unable to perform ROS: Acuity of condition       Physical Exam   Physical Exam   Vital signs and nursing notes reviewed    CONSTITUTIONAL: Alert, in severe distress; thin build, slightly disheveled. HEAD:  Normocephalic, atraumatic  EYES: PERRL; EOM's intact. ENTM: Nose: no rhinorrhea; Throat: no erythema or exudate, mucous membranes dry  Neck:  Supple. trachea is midline. RESP: Chest clear, equal breath sounds. - W/R/R  CV: S1 and S2 WNL; No murmurs, gallops or rubs. 2+ radial and DP pulses bilaterally. GI: non-distended, normal bowel sounds, abdomen soft with tenderness in the lower abdomen without rebound or guarding. No masses or organomegaly. : No costo-vertebral angle tenderness. BACK:  Non-tender, normal appearance  UPPER EXT:  Normal inspection. no joint or soft tissue swelling  LOWER EXT: No edema, no calf tenderness. NEURO: Alert and oriented x3, 5/5 strength and light touch sensation intact in bilateral upper and lower extremities. SKIN: No rashes;  Warm and dry  PSYCH: Normal mood, normal affect    Diagnostic Study Results     Labs -     Recent Results (from the past 12 hour(s))   EKG, 12 LEAD, INITIAL    Collection Time: 12/02/21  7:46 AM   Result Value Ref Range    Ventricular Rate 89 BPM    Atrial Rate 89 BPM    P-R Interval 164 ms    QRS Duration 104 ms    Q-T Interval 412 ms    QTC Calculation (Bezet) 501 ms    Calculated P Axis 86 degrees    Calculated R Axis 108 degrees    Calculated T Axis 69 degrees    Diagnosis       Normal sinus rhythm  Rightward axis  Pulmonary disease pattern  Nonspecific T wave abnormality  Prolonged QT  When compared with ECG of 23-SEP-2021 09:02,  No significant change was found  Confirmed by Kain Redman ([id number]) on 12/2/2021 10:45:00 AM     GLUCOSE, POC    Collection Time: 12/02/21  7:49 AM   Result Value Ref Range Glucose (POC) >600 (HH) 65 - 117 mg/dL    Performed by Pauline Torres RN    GLUCOSE, POC    Collection Time: 12/02/21  7:50 AM   Result Value Ref Range    Glucose (POC) >600 (HH) 65 - 117 mg/dL    Performed by Anitha JOYA RN    POC LACTIC ACID    Collection Time: 12/02/21  7:55 AM   Result Value Ref Range    Lactic Acid (POC) 4.63 (HH) 0.40 - 2.00 mmol/L   BLOOD GAS, ARTERIAL    Collection Time: 12/02/21  8:42 AM   Result Value Ref Range    pH 7.15 (LL) 7.35 - 7.45      PCO2 <17 (L) 35 - 45 mmHg    PO2 139 (H) 80 - 100 mmHg    O2 METHOD ROOM AIR      Sample source ARTERIAL      SITE LEFT BRACHIAL      KEIKO'S TEST NOT APPLICABLE      Critical value read back Called to Dr Andry Kasper on 12/02/2021 at 05:89    METABOLIC PANEL, COMPREHENSIVE    Collection Time: 12/02/21  8:43 AM   Result Value Ref Range    Sodium 128 (L) 136 - 145 mmol/L    Potassium 5.7 (H) 3.5 - 5.1 mmol/L    Chloride 82 (L) 97 - 108 mmol/L    CO2 6 (LL) 21 - 32 mmol/L    Anion gap 40 (H) 5 - 15 mmol/L    Glucose 1,098 (HH) 65 - 100 mg/dL    BUN 76 (H) 6 - 20 MG/DL    Creatinine 3.81 (H) 0.70 - 1.30 MG/DL    BUN/Creatinine ratio 20 12 - 20      GFR est AA 22 (L) >60 ml/min/1.73m2    GFR est non-AA 18 (L) >60 ml/min/1.73m2    Calcium 9.0 8.5 - 10.1 MG/DL    Bilirubin, total 0.6 0.2 - 1.0 MG/DL    ALT (SGPT) 42 12 - 78 U/L    AST (SGOT) 12 (L) 15 - 37 U/L    Alk.  phosphatase 225 (H) 45 - 117 U/L    Protein, total 6.5 6.4 - 8.2 g/dL    Albumin 2.6 (L) 3.5 - 5.0 g/dL    Globulin 3.9 2.0 - 4.0 g/dL    A-G Ratio 0.7 (L) 1.1 - 2.2     CBC WITH AUTOMATED DIFF    Collection Time: 12/02/21  8:43 AM   Result Value Ref Range    WBC 18.8 (H) 4.1 - 11.1 K/uL    RBC 3.59 (L) 4.10 - 5.70 M/uL    HGB 10.9 (L) 12.1 - 17.0 g/dL    HCT 35.1 (L) 36.6 - 50.3 %    MCV 97.8 80.0 - 99.0 FL    MCH 30.4 26.0 - 34.0 PG    MCHC 31.1 30.0 - 36.5 g/dL    RDW 14.0 11.5 - 14.5 %    PLATELET 814 342 - 403 K/uL    MPV 11.7 8.9 - 12.9 FL    NRBC 0.0 0  WBC    ABSOLUTE NRBC 0.00 0.00 - 0.01 K/uL    NEUTROPHILS 81 (H) 32 - 75 %    LYMPHOCYTES 11 (L) 12 - 49 %    MONOCYTES 5 5 - 13 %    EOSINOPHILS 0 0 - 7 %    BASOPHILS 1 0 - 1 %    IMMATURE GRANULOCYTES 2 (H) 0.0 - 0.5 %    ABS. NEUTROPHILS 15.3 (H) 1.8 - 8.0 K/UL    ABS. LYMPHOCYTES 2.1 0.8 - 3.5 K/UL    ABS. MONOCYTES 1.0 0.0 - 1.0 K/UL    ABS. EOSINOPHILS 0.0 0.0 - 0.4 K/UL    ABS. BASOPHILS 0.1 0.0 - 0.1 K/UL    ABS. IMM.  GRANS. 0.3 (H) 0.00 - 0.04 K/UL    DF AUTOMATED     TROPONIN-HIGH SENSITIVITY    Collection Time: 12/02/21  8:43 AM   Result Value Ref Range    Troponin-High Sensitivity 40 0 - 76 ng/L   ETHYL ALCOHOL    Collection Time: 12/02/21  8:43 AM   Result Value Ref Range    ALCOHOL(ETHYL),SERUM <10 <10 MG/DL   COVID-19 RAPID TEST    Collection Time: 12/02/21  8:43 AM   Result Value Ref Range    Specimen source Nasopharyngeal      COVID-19 rapid test Not detected NOTD     MAGNESIUM    Collection Time: 12/02/21  8:43 AM   Result Value Ref Range    Magnesium 3.3 (H) 1.6 - 2.4 mg/dL   BLOOD GAS,CHEM8,LACTIC ACID POC    Collection Time: 12/02/21 10:13 AM   Result Value Ref Range    Calcium, ionized (POC) 0.98 (L) 1.12 - 1.32 mmol/L    BICARBONATE 5 mmol/L    Base deficit (POC) 25.2 mmol/L    Sample source VENOUS BLOOD      CO2, POC 6 (LL) 19 - 24 MMOL/L    Sodium,  (L) 136 - 145 MMOL/L    Potassium, POC 5.4 3.5 - 5.5 MMOL/L    Chloride, POC 99 (L) 100 - 108 MMOL/L    Glucose, POC >700 (HH) 74 - 106 MG/DL    Creatinine, POC 3.0 (H) 0.6 - 1.3 MG/DL    Lactic Acid (POC) 3.98 (HH) 0.40 - 2.00 mmol/L    Critical value read back LISA     pH, venous (POC) 6.97 (LL) 7.32 - 7.42      pCO2, venous (POC) 21.0 (L) 41 - 51 MMHG    pO2, venous (POC) 49 (H) 25 - 40 mmHg   GLUCOSE, POC    Collection Time: 12/02/21 11:07 AM   Result Value Ref Range    Glucose (POC) >600 (HH) 65 - 117 mg/dL    Performed by Mercy Brown (GO RN)    METABOLIC PANEL, BASIC    Collection Time: 12/02/21 11:12 AM   Result Value Ref Range    Sodium 134 (L) 136 - 145 mmol/L    Potassium 4.3 3.5 - 5.1 mmol/L    Chloride 92 (L) 97 - 108 mmol/L    CO2 7 (LL) 21 - 32 mmol/L    Anion gap 35 (H) 5 - 15 mmol/L    Glucose 1,045 (HH) 65 - 100 mg/dL    BUN 75 (H) 6 - 20 MG/DL    Creatinine 3.55 (H) 0.70 - 1.30 MG/DL    BUN/Creatinine ratio 21 (H) 12 - 20      GFR est AA 23 (L) >60 ml/min/1.73m2    GFR est non-AA 19 (L) >60 ml/min/1.73m2    Calcium 7.9 (L) 8.5 - 10.1 MG/DL   MAGNESIUM    Collection Time: 12/02/21 11:12 AM   Result Value Ref Range    Magnesium 3.0 (H) 1.6 - 2.4 mg/dL   PHOSPHORUS    Collection Time: 12/02/21 11:12 AM   Result Value Ref Range    Phosphorus 11.0 (H) 2.6 - 4.7 MG/DL   SAMPLES BEING HELD    Collection Time: 12/02/21 11:12 AM   Result Value Ref Range    SAMPLES BEING HELD  PST     COMMENT        Add-on orders for these samples will be processed based on acceptable specimen integrity and analyte stability, which may vary by analyte.    URINALYSIS W/ REFLEX CULTURE    Collection Time: 12/02/21 11:31 AM    Specimen: Urine   Result Value Ref Range    Color YELLOW/STRAW      Appearance CLOUDY (A) CLEAR      Specific gravity 1.023 1.003 - 1.030      pH (UA) 5.0 5.0 - 8.0      Protein 300 (A) NEG mg/dL    Glucose >1,000 (A) NEG mg/dL    Ketone 40 (A) NEG mg/dL    Bilirubin Negative NEG      Blood TRACE (A) NEG      Urobilinogen 0.2 0.2 - 1.0 EU/dL    Nitrites Negative NEG      Leukocyte Esterase Negative NEG      WBC 5-10 0 - 4 /hpf    RBC 10-20 0 - 5 /hpf    Epithelial cells MODERATE (A) FEW /lpf    Bacteria 1+ (A) NEG /hpf    UA:UC IF INDICATED CULTURE NOT INDICATED BY UA RESULT CNI      Mucus TRACE (A) NEG /lpf    Hyaline cast 2-5 0 - 5 /lpf    Granular cast 2-5 (A) NEG /lpf   DRUG SCREEN, URINE    Collection Time: 12/02/21 11:31 AM   Result Value Ref Range    AMPHETAMINES Negative NEG      BARBITURATES Negative NEG      BENZODIAZEPINES Negative NEG      COCAINE Positive (A) NEG      METHADONE Negative NEG      OPIATES Negative NEG      PCP(PHENCYCLIDINE) Negative NEG      THC (TH-CANNABINOL) Positive (A) NEG      Drug screen comment (NOTE)    GLUCOSE, POC    Collection Time: 12/02/21 12:02 PM   Result Value Ref Range    Glucose (POC) >600 (HH) 65 - 117 mg/dL    Performed by Anitha JOYA RN    GLUCOSTABILIZER    Collection Time: 12/02/21 12:06 PM   Result Value Ref Range    Glucose 601 mg/dL    Insulin order 10.8 units/hour    Insulin adminstered 10.8 units/hour    Multiplier 0.020     Low target 150 mg/dL    High target 250 mg/dL    D50 order 0.0 ml    D50 administered 0.00 ml    Minutes until next BG 60 min    Order initials mtm     Administered initials mtm     GLSCOM Comments mtm    GLUCOSE, POC    Collection Time: 12/02/21  1:18 PM   Result Value Ref Range    Glucose (POC) >600 (HH) 65 - 117 mg/dL    Performed by Jammie Kulkarni (TRV RN)    GLUCOSTABILIZER    Collection Time: 12/02/21  1:22 PM   Result Value Ref Range    Glucose 601 mg/dL    Insulin order 16.2 units/hour    Insulin adminstered 16.2 units/hour    Multiplier 0.030     Low target 150 mg/dL    High target 250 mg/dL    D50 order 0.0 ml    D50 administered 0.00 ml    Minutes until next BG 60 min    Order initials KM     Administered initials KM     GLSCOM Comments         Radiologic Studies -   CT ABD PELV WO CONT   Final Result      1. Gastric and small bowel fluid distention. This may be ileus. 2. Markedly distended urinary bladder with Beard as above. 3. Bibasilar atelectasis. XR CHEST PORT   Final Result   No acute process. CT Results  (Last 48 hours)               12/02/21 1150  CT ABD PELV WO CONT Final result    Impression:      1. Gastric and small bowel fluid distention. This may be ileus. 2. Markedly distended urinary bladder with Beard as above. 3. Bibasilar atelectasis. Narrative:  EXAM: CT ABD PELV WO CONT       INDICATION: abdominal distention       COMPARISON: January 3, 2020. CONTRAST:  None.        TECHNIQUE:    Thin axial images were obtained through the abdomen and pelvis. Coronal and   sagittal reformats were generated. Oral contrast was not administered. CT dose   reduction was achieved through use of a standardized protocol tailored for this   examination and automatic exposure control for dose modulation. The absence of intravenous contrast material reduces the sensitivity for   evaluation of the vasculature and solid organs. FINDINGS:    LOWER THORAX: There is linear atelectasis slightly more prominent on the right. LIVER: No mass. BILIARY TREE: Gallbladder is within normal limits. CBD is not dilated. SPLEEN: within normal limits. PANCREAS: No focal abnormality. ADRENALS: Unremarkable. KIDNEYS/URETERS: No calculus or hydronephrosis. STOMACH: Mild gastric fluid distention. SMALL BOWEL: Small bowel fluid distention appears nonspecific. COLON: No dilatation or wall thickening. APPENDIX: Appears normal   PERITONEUM: No ascites or pneumoperitoneum. RETROPERITONEUM: No lymphadenopathy or aortic aneurysm. URINARY BLADDER: A distended urinary bladder with Beard catheter in place. Please consider tube obstruction. BONES: No destructive bone lesion. ABDOMINAL WALL: No mass or hernia. ADDITIONAL COMMENTS: Right femoral intravascular catheter               CXR Results  (Last 48 hours)               12/02/21 0810  XR CHEST PORT Final result    Impression:  No acute process. Narrative: Indication: Nausea       Comparison: 9/23/2021       Portable exam of the chest obtained at 810 demonstrates normal heart size. There   is no acute process in the lung fields. The osseous structures are unremarkable. Medical Decision Making   I am the first provider for this patient. I reviewed the vital signs, available nursing notes, past medical history, past surgical history, family history and social history. Vital Signs-Reviewed the patient's vital signs.   Patient Vitals for the past 12 hrs: Temp Pulse Resp BP SpO2   12/02/21 1315 -- 92 19 104/66 100 %   12/02/21 1250 (!) 92.4 °F (33.6 °C) 85 26 100/66 100 %   12/02/21 1215 -- -- -- (!) 121/47 --   12/02/21 1209 -- 86 -- (!) 106/53 --   12/02/21 1155 -- 83 (!) 31 (!) 101/42 100 %   12/02/21 1137 -- 76 -- (!) 82/40 --   12/02/21 1050 -- 78 -- (!) 78/40 --   12/02/21 0948 -- 81 27 (!) 91/37 100 %   12/02/21 0945 -- 79 27 (!) 71/52 97 %   12/02/21 0740 (!) 95.1 °F (35.1 °C) -- -- -- --   12/02/21 0732 -- 92 (!) 31 (!) 103/53 99 %       EKG interpretation: (Preliminary)  EKG performed at 7:46 AM shows a sinus rhythm at a rate of 89 with a right axis deviation, prolonged QT, subtle ST depressions in the lateral precordial leads, no acute ST elevations. Records Reviewed: Nursing Notes, Old Medical Records and Ambulance Run Sheet    Provider Notes (Medical Decision Making):   70-year-old male, meeting sepsis criteria, unclear infectious etiology, broadly covered with antibiotics including MRSA given known MRSA in the past, need for insulin drip, bicarbonate administration and Levophed to support blood pressures. Patient is critically ill and needs ICU admission. ED Course:   Initial assessment performed. The patients presenting problems have been discussed, and they are in agreement with the care plan formulated and outlined with them. I have encouraged them to ask questions as they arise throughout their visit. ED Course as of 12/02/21 1404   Thu Dec 02, 2021   1030 Spoke to intensivist who will come see patient. Nurse readjusting Doug lopez to keep patient warm, patient tore out 1 IV, will need second IV replaced, staff working on now. Bicarb, insulin and additional IV fluids of been ordered. [TL]      ED Course User Index  [TL] Lali Valle MD     7:58 AM - I suspect that this patient has an active infection. 7:58 AM - The patient met criteria for severe sepsis at this time.       PROVIDER SEPSIS PHYSICAL EXAM EVAL  Vital signs reviewed (see nursing documentation for further details):  Vitals:    12/02/21 1209 12/02/21 1215 12/02/21 1250 12/02/21 1315   BP: (!) 106/53 (!) 121/47 100/66 104/66   Pulse: 86  85 92   Resp:   26 19   Temp:   (!) 92.4 °F (33.6 °C)    SpO2:   100% 100%       Cardiac exam:Normal rate    Pulmonary exam:Clear Lungs    Peripheral pulses:Diminished    Capillary refill:Delayed    Skin exam:pale    Exam performed by Facundo Epps MD        Critical Care Time:   CRITICAL CARE NOTE :    7:27 AM    IMPENDING DETERIORATION -Cardiovascular and Metabolic  ASSOCIATED RISK FACTORS - Hypotension, Shock, Metabolic changes and Dehydration  MANAGEMENT- Bedside Assessment and Supervision of Care  INTERPRETATION -  Xrays, Blood Gases, ECG, Blood Pressure and Cardiac Output Measures   INTERVENTIONS - hemodynamic mngmt, insulin drip for DKA and antibiotics for sepsis  CASE REVIEW - Hospitalist/Intensivist and Nursing  TREATMENT RESPONSE -Stable  PERFORMED BY - Self    NOTES   :  I have spent 65 minutes of critical care time involved in lab review, consultations with specialist, family decision- making, bedside attention and documentation. This time excludes time spent in any separate billed procedures. During this entire length of time I was immediately available to the patient . Facundo Epps MD    Procedure Note - Central Line Placement:   2:05 PM  Performed by: Facundo Epps MD      Immediately prior to the procedure, the patient was reevaluated and found suitable for the planned procedure and any planned medications. The patient was emergently in need of a central line due to lack of access and verbal consent was obtained but patient too weak to sign. Area was cleansed with Chlorprep and anesthetized with 2mLs of 1% lidocaine. Prepped and draped in sterile fashion. Landmarks identified. 18 gauge needle with triple lumen catheter was inserted into pt's Right, Femoral Vein without ultrasound guidance.  Line sutured in place; sterile dressing applied. Position: Trendelenburg  Number of attempts: 1  Estimated blood loss: < 5cc  The procedure took 16-30 minutes, and pt tolerated well. Disposition:  Admit    Admit Note:  10:32 AM  Pt is being admitted by Intensivist. The results of their tests and reason(s) for their admission have been discussed with pt and/or available family. They convey agreement and understanding for the need to be admitted and for admission diagnosis. Diagnosis     Clinical Impression:   1. Diabetic ketoacidosis without coma associated with diabetes mellitus due to underlying condition ([facility] [address]    2. SIRS (systemic inflammatory response syndrome) ([facility])        Attestations:    Tiffanie Moya MD    Please note that this dictation was completed with [employer], the computer voice recognition software. Quite often unanticipated grammatical, syntax, homophones, and other interpretive errors are inadvertently transcribed by the computer software. Please disregard these errors. Please excuse any errors that have escaped final proofreading. Thank you.

## 2021-12-02 NOTE — DIABETES MGMT
[address] [facility] NURSE SPECIALIST CONSULT     Initial Presentation   Randal Fenton is a 46 y.o. male admitted 12/2/2021 from the ER with nausea and vomiting x 2 days. Hypothermic, hypotensive and code sepsis care activated. Patient reports not taking insulin for 1 day. BG >500 by EMS. HX:   Past Medical History:   Diagnosis Date    Bipolar 1 disorder, depressed ([address]     Bipolar disorder ([address]     Depression     Diabetes ([address]     DKA, type 1 ([address] 1/27/2013    diagnosed age 23    H/O noncompliance with medical treatment, presenting hazards to health     MRSA (methicillin resistant staph aureus) culture positive     MRSA (methicillin resistant Staphylococcus aureus)     Face    Noncompliance with medication regimen     Second hand smoke exposure     Seizure ([address]     Seizures ([address] 2006 or 2007    one episode during skilled nursing      INITIAL DX:   DKA (diabetic ketoacidosis) ([address] [E11.10]     Current Treatment     TX: Insulin. BP management. IVF. Clot Prevention. Protonix. Consulted by Provider for advanced diabetes nursing assessment and care for:   [x] Transitioning off [place]   [x] Inpatient management strategy  [x] Home management assessment    Hospital Course   Clinical progress has been complicated by DKA. Diabetes History   The patient reports a 20 year history of Type 1 diabetes. He has a positive family hx of diabetes (mom & niece). He reports taking 30 units of Lantus daily and 5-10 units of Humalog with meals. The patient wears a Freestyle Pati at home to monitor BG's. He follows with a PCP,  Venus Hearn MD, for diabetes management. He resides in a 1 story home with his mother.      Diabetes-related Medical History  Acute complications  DKA  Neurological complications  Peripheral neuropathy  Microvascular disease  Retinopathy    Diabetes Medication History  Key Antihyperglycemic Medications               insulin glargine (Lantus Solostar U-100 Insulin) 100 unit/mL (3 mL) inpn INJECT 30 UNITS SUBCUTANEOUSLY DAILY    insulin glargine (Lantus U-100 Insulin) 100 unit/mL injection 22 Units by SubCUTAneous route nightly. insulin aspart U-100 (NovoLOG Flexpen U-100 Insulin) 100 unit/mL (3 mL) inpn (Novolog or Humalog, whichever more preferred: Inject 5 units with each meal + correction insulin, up to 30 units per day           Diabetes self-management practices:   Eating pattern   [x] Not eating a carbohydrate-controlled mealplan  [x] Lunch  2 packs of peanut-butter crackers  [x] Dinner  Bag of potato skins  [x] Beverages water  Physical activity pattern   [x] Employing a physical activity program to control BG  Monitoring pattern   [x] Testing BGs sufficiently to inform self-management adjustments (CGM)  Taking medications pattern  [x] Consistent administration  [x] Affordable  Social determinants of health impacting diabetes self-management practices   Concerned that you need to know more about how to stay healthy with diabetes  Overall evaluation:    [x] Improving A1c target with drug therapy & self-care practices    Subjective   I live with my mom.      Objective   Physical exam  General Normal weight,  male, who is ill-appearing.  Conversant and cooperative  Neuro  Alert, oriented   Vital Signs   Visit Vitals  BP (!) 97/48   Pulse 95   Temp (!) 94.2 °F (34.6 °C)   Resp 27   Ht 5' 9\" (1.753 m)   Wt 68.7 kg (151 lb 7.3 oz)   SpO2 100%   BMI 22.37 kg/m²     Laboratory  Recent Labs     12/02/21  1112 12/02/21  0843   GLU 1,045* 1,098*   AGAP 35* 40*   WBC  --  18.8*   CREA 3.55* 3.81*   GFRNA 19* 18*   AST  --  12*   ALT  --  42     Factors impacting BG management  Factor Dose Comments   Nutrition:  NPO     Drugs:  Vasopressor load   Levophed (9 mcg/min)   Affects insulin delivery   Other:   Kidney function    GFR 19     BG pattern   d     Significant diabetes-related events over the past 24-72 hours  12/2/21 Initial BG 1045/AG 40.    Assessment and Plan   Nursing Diagnosis Risk for unstable blood glucose pattern   Nursing Intervention Domain 0667 Decision-making Support   Nursing Interventions Examined current inpatient diabetes/blood glucose control   Explored factors facilitating and impeding inpatient management  Explored corrective strategies with patient and responsible inpatient provider   Informed patient of rational for insulin strategy while hospitalized     Evaluation   This 46year old  male, with Type 1 diabetes, did not achieve diabetes control prior to admission, as evidenced by A1c of 9 % (9/23/2021). The patient is still in DKA at this time, evidenced by most recent BG of 1045 and AG of 35. On Glucostabilizer; current rate is 27.1 units/hr. Has received >2000cc of IVF. We will continue to monitor BG trends and total insulin volume to determine basal dose at the time of transitioning off [address] on a previous admission in 2020, he required 20 units of basal insulin. Recommendations      [x] Glucostabilizer until AG closes and BG under 250    [x] Referral to  [x] Program for Diabetes [facility] (Phone [phone] to schedule appointment) for [facility]    Billing Code(s)   [x] 52256 IP subsequent hospital care - 35 minutes     Before making these care recommendations, I personally reviewed the hospitalization record, including notes, laboratory & diagnostic data and current medications, and examined the patient at the bedside (circumstances permitting) before making care recommendations.      Total minutes: [address], RN, KATHRYN-RONALD Lizama, ANEESH  Diabetes Clinical Nurse Specialist  [facility]  Access via [address] [place] [address]

## 2021-12-02 NOTE — H&P
Pulmonology Intensive Care Unit Initial Assessment    Subjective:        Subjective:     Critical Care Initial Evaluation Note: 12/2/2021 10:39 AM    This is 72-year-old  male with history of type 1 diabetes mellitus. He presented to emergency department with complaint of intractable nausea and vomiting that started yesterday. He also has history of bipolar disorder. He reports that yesterday he did not take his insulin because he could not eat anything because of nausea and vomiting. He was found to have blood sugar more than 8000, bicarb of 5 and pH of 7.1. He is going to be admitted to ICU for further care and monitoring. Past Medical History:   Diagnosis Date    Bipolar 1 disorder, depressed ([address]     Bipolar disorder ([address]     Depression     Diabetes ([address]     DKA, type 1 ([address] 1/27/2013    diagnosed age 23    H/O noncompliance with medical treatment, presenting hazards to health     MRSA (methicillin resistant staph aureus) culture positive     MRSA (methicillin resistant Staphylococcus aureus)     Face    Noncompliance with medication regimen     Second hand smoke exposure     Seizure ([address]     Seizures ([address] 2006 or 2007    one episode during [facility]      Past Surgical History:   Procedure Laterality Date    HX HEENT      top left wisdom tooth    HX ORTHOPAEDIC Left     wrist; [facility]CV    UPPER GI ENDOSCOPY,BIOPSY  11/20/2018           Prior to Admission medications    Medication Sig Start Date End Date Taking? Authorizing Provider   insulin glargine (Lantus Solostar U-100 Insulin) 100 unit/mL (3 mL) inpn INJECT 30 UNITS SUBCUTANEOUSLY DAILY 11/15/21   Cynthia Becerra MD   pantoprazole (Protonix) 40 mg tablet Take 1 Tablet by mouth daily.  11/15/21   Cynthia Becerra MD   ergocalciferol (ERGOCALCIFEROL) 1,250 mcg (50,000 unit) capsule Take 1 capsule by mouth once a week 10/30/21   Cynthia Becerra MD   ondansetron (Zofran ODT) 4 mg disintegrating tablet Take 1 Tablet by mouth every eight (8) hours as needed for Nausea. 10/12/21   Petrona Young MD   naloxone (Narcan) 4 mg/actuation nasal spray Use 1 spray intranasally, then discard. Repeat with new spray every 2 min as needed for opioid overdose symptoms, alternating nostrils. 10/7/21   Maude Perkins MD   docusate sodium (Colace) 100 mg capsule Take 100 mg by mouth two (2) times a day. Provider, Historical   clotrimazole (LOTRIMIN) 1 % topical cream Apply  to affected area two (2) times a day. Provider, Historical   insulin glargine (Lantus U-100 Insulin) 100 unit/mL injection 22 Units by SubCUTAneous route nightly. 21   Juan Miguel Escaimlla MD   pregabalin (Lyrica) 75 mg capsule Take 75 mg by mouth two (2) times a day. Provider, Historical   rosuvastatin (CRESTOR) 40 mg tablet Take 1 Tablet by mouth nightly. 21   Driss Oneal MD   metoprolol tartrate (LOPRESSOR) 25 mg tablet Take 1 Tablet by mouth two (2) times a day. 21   Driss Oneal MD   lisinopriL (PRINIVIL, ZESTRIL) 20 mg tablet Take 1 Tablet by mouth daily. 21   Driss Oneal MD   furosemide (Lasix) 40 mg tablet Take 1 Tablet by mouth daily.  21   Driss Oneal MD   insulin aspart U-100 (NovoLOG Flexpen U-100 Insulin) 100 unit/mL (3 mL) inpn (Novolog or Humalog, whichever more preferred: Inject 5 units with each meal + correction insulin, up to 30 units per day 21   Driss Oneal MD   sildenafil citrate (VIAGRA) 50 mg tablet TAKE 1 TABLET BY MOUTH AS NEEDED 21   Driss Oneal MD     No Known Allergies   Social History     Tobacco Use    Smoking status: Former Smoker     Packs/day: 0.10     Years: 16.00     Pack years: 1.60     Types: Cigarettes     Start date: 10/4/1999     Quit date: 2020     Years since quittin.7    Smokeless tobacco: Never Used   Substance Use Topics    Alcohol use: No      Family History   Problem Relation Age of Onset    Diabetes Mother     Diabetes Other skinny, type 1         Review of Systems   All other systems reviewed and are negative. Objective:     Vital signs reviewed. 12/02 0701 - 12/02 1900  In: 2200 [I.V.:2200]  Out: -   No intake/output data recorded. Physical Exam  Constitutional:       Appearance: Normal appearance. HENT:      Head: Normocephalic and atraumatic. Nose: No congestion. Mouth/Throat:      Mouth: Mucous membranes are moist.   Eyes:      Extraocular Movements: Extraocular movements intact. Conjunctiva/sclera: Conjunctivae normal.   Cardiovascular:      Rate and Rhythm: Normal rate and regular rhythm. Pulmonary:      Effort: Pulmonary effort is normal. No respiratory distress. Breath sounds: No wheezing or rales. Abdominal:      General: Abdomen is flat. There is no distension. Palpations: Abdomen is soft. Tenderness: There is no abdominal tenderness. There is no guarding. Musculoskeletal:      Cervical back: Normal range of motion and neck supple. Skin:     General: Skin is warm and dry. Neurological:      Mental Status: He is alert and oriented to person, place, and time.          Data Review:     Recent Results (from the past 24 hour(s))   EKG, 12 LEAD, INITIAL    Collection Time: 12/02/21  7:46 AM   Result Value Ref Range    Ventricular Rate 89 BPM    Atrial Rate 89 BPM    P-R Interval 164 ms    QRS Duration 104 ms    Q-T Interval 412 ms    QTC Calculation (Bezet) 501 ms    Calculated P Axis 86 degrees    Calculated R Axis 108 degrees    Calculated T Axis 69 degrees    Diagnosis       ** Poor data quality, interpretation may be adversely affected  Normal sinus rhythm  Rightward axis  Pulmonary disease pattern  Junctional ST depression, probably normal  Prolonged QT  When compared with ECG of 23-SEP-2021 09:02,  No significant change was found     GLUCOSE, POC    Collection Time: 12/02/21  7:49 AM   Result Value Ref Range    Glucose (POC) >600 () 65 - 117 mg/dL    Performed by Marian JOYA RN    GLUCOSE, POC    Collection Time: 12/02/21  7:50 AM   Result Value Ref Range    Glucose (POC) >600 (HH) 65 - 117 mg/dL    Performed by Marian JOAY RN    POC LACTIC ACID    Collection Time: 12/02/21  7:55 AM   Result Value Ref Range    Lactic Acid (POC) 4.63 (HH) 0.40 - 2.00 mmol/L   BLOOD GAS, ARTERIAL    Collection Time: 12/02/21  8:42 AM   Result Value Ref Range    pH 7.15 (LL) 7.35 - 7.45      PCO2 <17 (L) 35 - 45 mmHg    PO2 139 (H) 80 - 100 mmHg    O2 METHOD ROOM AIR      Sample source ARTERIAL      SITE LEFT BRACHIAL      KEIKO'S TEST NOT APPLICABLE      Critical value read back Called to Dr Amina Desai on 12/02/2021 at 31:50    METABOLIC PANEL, COMPREHENSIVE    Collection Time: 12/02/21  8:43 AM   Result Value Ref Range    Sodium 128 (L) 136 - 145 mmol/L    Potassium 5.7 (H) 3.5 - 5.1 mmol/L    Chloride 82 (L) 97 - 108 mmol/L    CO2 6 (LL) 21 - 32 mmol/L    Anion gap 40 (H) 5 - 15 mmol/L    Glucose 1,098 (HH) 65 - 100 mg/dL    BUN 76 (H) 6 - 20 MG/DL    Creatinine 3.81 (H) 0.70 - 1.30 MG/DL    BUN/Creatinine ratio 20 12 - 20      GFR est AA 22 (L) >60 ml/min/1.73m2    GFR est non-AA 18 (L) >60 ml/min/1.73m2    Calcium 9.0 8.5 - 10.1 MG/DL    Bilirubin, total 0.6 0.2 - 1.0 MG/DL    ALT (SGPT) 42 12 - 78 U/L    AST (SGOT) 12 (L) 15 - 37 U/L    Alk.  phosphatase 225 (H) 45 - 117 U/L    Protein, total 6.5 6.4 - 8.2 g/dL    Albumin 2.6 (L) 3.5 - 5.0 g/dL    Globulin 3.9 2.0 - 4.0 g/dL    A-G Ratio 0.7 (L) 1.1 - 2.2     CBC WITH AUTOMATED DIFF    Collection Time: 12/02/21  8:43 AM   Result Value Ref Range    WBC 18.8 (H) 4.1 - 11.1 K/uL    RBC 3.59 (L) 4.10 - 5.70 M/uL    HGB 10.9 (L) 12.1 - 17.0 g/dL    HCT 35.1 (L) 36.6 - 50.3 %    MCV 97.8 80.0 - 99.0 FL    MCH 30.4 26.0 - 34.0 PG    MCHC 31.1 30.0 - 36.5 g/dL    RDW 14.0 11.5 - 14.5 %    PLATELET 261 646 - 311 K/uL    MPV 11.7 8.9 - 12.9 FL    NRBC 0.0 0  WBC    ABSOLUTE NRBC 0.00 0.00 - 0.01 K/uL    NEUTROPHILS 81 (H) 32 - 75 % LYMPHOCYTES 11 (L) 12 - 49 %    MONOCYTES 5 5 - 13 %    EOSINOPHILS 0 0 - 7 %    BASOPHILS 1 0 - 1 %    IMMATURE GRANULOCYTES 2 (H) 0.0 - 0.5 %    ABS. NEUTROPHILS 15.3 (H) 1.8 - 8.0 K/UL    ABS. LYMPHOCYTES 2.1 0.8 - 3.5 K/UL    ABS. MONOCYTES 1.0 0.0 - 1.0 K/UL    ABS. EOSINOPHILS 0.0 0.0 - 0.4 K/UL    ABS. BASOPHILS 0.1 0.0 - 0.1 K/UL    ABS. IMM. GRANS. 0.3 (H) 0.00 - 0.04 K/UL    DF AUTOMATED     TROPONIN-HIGH SENSITIVITY    Collection Time: 12/02/21  8:43 AM   Result Value Ref Range    Troponin-High Sensitivity 40 0 - 76 ng/L   ETHYL ALCOHOL    Collection Time: 12/02/21  8:43 AM   Result Value Ref Range    ALCOHOL(ETHYL),SERUM <10 <10 MG/DL   COVID-19 RAPID TEST    Collection Time: 12/02/21  8:43 AM   Result Value Ref Range    Specimen source Nasopharyngeal      COVID-19 rapid test Not detected NOTD     MAGNESIUM    Collection Time: 12/02/21  8:43 AM   Result Value Ref Range    Magnesium 3.3 (H) 1.6 - 2.4 mg/dL   BLOOD GAS,CHEM8,LACTIC ACID POC    Collection Time: 12/02/21 10:13 AM   Result Value Ref Range    Calcium, ionized (POC) 0.98 (L) 1.12 - 1.32 mmol/L    BICARBONATE 5 mmol/L    Base deficit (POC) 25.2 mmol/L    Sample source VENOUS BLOOD      CO2, POC 6 (LL) 19 - 24 MMOL/L    Sodium,  (L) 136 - 145 MMOL/L    Potassium, POC 5.4 3.5 - 5.5 MMOL/L    Chloride, POC 99 (L) 100 - 108 MMOL/L    Glucose, POC >700 (HH) 74 - 106 MG/DL    Creatinine, POC 3.0 (H) 0.6 - 1.3 MG/DL    Lactic Acid (POC) 3.98 (HH) 0.40 - 2.00 mmol/L    Critical value read back LISA     pH, venous (POC) 6.97 (LL) 7.32 - 7.42      pCO2, venous (POC) 21.0 (L) 41 - 51 MMHG    pO2, venous (POC) 49 (H) 25 - 40 mmHg       Assessment:     -DKA. pseudohyponatremia. Actually patient has hypernatremia with corrected sodium of 158.  -Acute renal failure. -Hyperkalemia.  -Leukocytosis. Likely reactive.  -Polysubstance abuse. Positive for cocaine and THC.  -H/o HTN.  -HLD. -Non compliance.  -Peripheral neuropathy. -GERD.     Plan: -Start insulin gtt per DKA protocol.  -Aggressive volume resuscitation.  -Patient is given 2 A of bicarb for correction of pH. -Serial BMP every 4 hours.  -Glucose check every hour.  -Once glucose drops below 250, start D5 half-normal saline at 150 mL/h. -Obtain blood cultures.  -Hold off on antibiotics for now.  -Continue Lipitor.  -Continue Lyrica.  -Hold off on blood pressure medications including lisinopril for now.  -Continue Protonix.  -Closely monitor sodium level.  -Closely monitor urine output, avoid nephrotoxins. Prophylaxis:  Stress Ulcer Protocol Active: Continue Protonix. DVT Protocol Active: Subcu heparin  CODE STATUS. Full code. Patient is critically ill and has high chances of further decompensation. 50 minutes of critical care time spent without overlap and excluding procedures.

## 2021-12-03 LAB
ADMINISTERED INITIALS, ADMINIT: NORMAL
ANION GAP SERPL CALC-SCNC: 10 MMOL/L (ref 5–15)
ANION GAP SERPL CALC-SCNC: 6 MMOL/L (ref 5–15)
ANION GAP SERPL CALC-SCNC: 9 MMOL/L (ref 5–15)
BUN SERPL-MCNC: 44 MG/DL (ref 6–20)
BUN SERPL-MCNC: 54 MG/DL (ref 6–20)
BUN SERPL-MCNC: 58 MG/DL (ref 6–20)
BUN/CREAT SERPL: 18 (ref 12–20)
BUN/CREAT SERPL: 19 (ref 12–20)
BUN/CREAT SERPL: 20 (ref 12–20)
CALCIUM SERPL-MCNC: 7.3 MG/DL (ref 8.5–10.1)
CALCIUM SERPL-MCNC: 7.6 MG/DL (ref 8.5–10.1)
CALCIUM SERPL-MCNC: 7.6 MG/DL (ref 8.5–10.1)
CHLORIDE SERPL-SCNC: 109 MMOL/L (ref 97–108)
CHLORIDE SERPL-SCNC: 109 MMOL/L (ref 97–108)
CHLORIDE SERPL-SCNC: 110 MMOL/L (ref 97–108)
CO2 SERPL-SCNC: 24 MMOL/L (ref 21–32)
CO2 SERPL-SCNC: 25 MMOL/L (ref 21–32)
CO2 SERPL-SCNC: 26 MMOL/L (ref 21–32)
CREAT SERPL-MCNC: 2.48 MG/DL (ref 0.7–1.3)
CREAT SERPL-MCNC: 2.74 MG/DL (ref 0.7–1.3)
CREAT SERPL-MCNC: 3.12 MG/DL (ref 0.7–1.3)
D50 ADMINISTERED, D50ADM: 0 ML
D50 ADMINISTERED, D50ADM: 10 ML
D50 ORDER, D50ORD: 0 ML
D50 ORDER, D50ORD: 10 ML
ERYTHROCYTE [DISTWIDTH] IN BLOOD BY AUTOMATED COUNT: 14.5 % (ref 11.5–14.5)
GLSCOM COMMENTS: NORMAL
GLUCOSE BLD STRIP.AUTO-MCNC: 101 MG/DL (ref 65–117)
GLUCOSE BLD STRIP.AUTO-MCNC: 102 MG/DL (ref 65–117)
GLUCOSE BLD STRIP.AUTO-MCNC: 107 MG/DL (ref 65–117)
GLUCOSE BLD STRIP.AUTO-MCNC: 133 MG/DL (ref 65–117)
GLUCOSE BLD STRIP.AUTO-MCNC: 140 MG/DL (ref 65–117)
GLUCOSE BLD STRIP.AUTO-MCNC: 155 MG/DL (ref 65–117)
GLUCOSE BLD STRIP.AUTO-MCNC: 160 MG/DL (ref 65–117)
GLUCOSE BLD STRIP.AUTO-MCNC: 160 MG/DL (ref 65–117)
GLUCOSE BLD STRIP.AUTO-MCNC: 170 MG/DL (ref 65–117)
GLUCOSE BLD STRIP.AUTO-MCNC: 170 MG/DL (ref 65–117)
GLUCOSE BLD STRIP.AUTO-MCNC: 174 MG/DL (ref 65–117)
GLUCOSE BLD STRIP.AUTO-MCNC: 266 MG/DL (ref 65–117)
GLUCOSE BLD STRIP.AUTO-MCNC: 278 MG/DL (ref 65–117)
GLUCOSE BLD STRIP.AUTO-MCNC: 74 MG/DL (ref 65–117)
GLUCOSE SERPL-MCNC: 105 MG/DL (ref 65–100)
GLUCOSE SERPL-MCNC: 129 MG/DL (ref 65–100)
GLUCOSE SERPL-MCNC: 222 MG/DL (ref 65–100)
GLUCOSE, GLC: 101 MG/DL
GLUCOSE, GLC: 102 MG/DL
GLUCOSE, GLC: 107 MG/DL
GLUCOSE, GLC: 133 MG/DL
GLUCOSE, GLC: 140 MG/DL
GLUCOSE, GLC: 155 MG/DL
GLUCOSE, GLC: 160 MG/DL
GLUCOSE, GLC: 160 MG/DL
GLUCOSE, GLC: 170 MG/DL
GLUCOSE, GLC: 170 MG/DL
GLUCOSE, GLC: 174 MG/DL
GLUCOSE, GLC: 74 MG/DL
HCT VFR BLD AUTO: 26.5 % (ref 36.6–50.3)
HGB BLD-MCNC: 9.3 G/DL (ref 12.1–17)
HIGH TARGET, HITG: 250 MG/DL
INSULIN ADMINSTERED, INSADM: 0 UNITS/HOUR
INSULIN ADMINSTERED, INSADM: 1.7 UNITS/HOUR
INSULIN ADMINSTERED, INSADM: 1.7 UNITS/HOUR
INSULIN ADMINSTERED, INSADM: 11.4 UNITS/HOUR
INSULIN ADMINSTERED, INSADM: 2 UNITS/HOUR
INSULIN ADMINSTERED, INSADM: 2.1 UNITS/HOUR
INSULIN ADMINSTERED, INSADM: 2.3 UNITS/HOUR
INSULIN ADMINSTERED, INSADM: 3.8 UNITS/HOUR
INSULIN ORDER, INSORD: 0 UNITS/HOUR
INSULIN ORDER, INSORD: 1.7 UNITS/HOUR
INSULIN ORDER, INSORD: 1.7 UNITS/HOUR
INSULIN ORDER, INSORD: 11.4 UNITS/HOUR
INSULIN ORDER, INSORD: 2 UNITS/HOUR
INSULIN ORDER, INSORD: 2.1 UNITS/HOUR
INSULIN ORDER, INSORD: 2.3 UNITS/HOUR
INSULIN ORDER, INSORD: 3.8 UNITS/HOUR
LACTATE SERPL-SCNC: 1.9 MMOL/L (ref 0.4–2)
LOW TARGET, LOT: 150 MG/DL
MAGNESIUM SERPL-MCNC: 2.2 MG/DL (ref 1.6–2.4)
MCH RBC QN AUTO: 30.5 PG (ref 26–34)
MCHC RBC AUTO-ENTMCNC: 35.1 G/DL (ref 30–36.5)
MCV RBC AUTO: 86.9 FL (ref 80–99)
MINUTES UNTIL NEXT BG, NBG: 15 MIN
MINUTES UNTIL NEXT BG, NBG: 60 MIN
MULTIPLIER, MUL: 0.02
MULTIPLIER, MUL: 0.03
MULTIPLIER, MUL: 0.04
MULTIPLIER, MUL: 0.05
MULTIPLIER, MUL: 0.06
MULTIPLIER, MUL: 0.08
MULTIPLIER, MUL: 0.1
NRBC # BLD: 0 K/UL (ref 0–0.01)
NRBC BLD-RTO: 0 PER 100 WBC
ORDER INITIALS, ORDINIT: NORMAL
PLATELET # BLD AUTO: 327 K/UL (ref 150–400)
PMV BLD AUTO: 10.4 FL (ref 8.9–12.9)
POTASSIUM SERPL-SCNC: 3.5 MMOL/L (ref 3.5–5.1)
POTASSIUM SERPL-SCNC: 3.8 MMOL/L (ref 3.5–5.1)
POTASSIUM SERPL-SCNC: 4.3 MMOL/L (ref 3.5–5.1)
RBC # BLD AUTO: 3.05 M/UL (ref 4.1–5.7)
SERVICE CMNT-IMP: ABNORMAL
SERVICE CMNT-IMP: NORMAL
SODIUM SERPL-SCNC: 141 MMOL/L (ref 136–145)
SODIUM SERPL-SCNC: 143 MMOL/L (ref 136–145)
SODIUM SERPL-SCNC: 144 MMOL/L (ref 136–145)
WBC # BLD AUTO: 16.1 K/UL (ref 4.1–11.1)

## 2021-12-03 PROCEDURE — 74011636637 HC RX REV CODE- 636/637: Performed by: EMERGENCY MEDICINE

## 2021-12-03 PROCEDURE — 82962 GLUCOSE BLOOD TEST: CPT

## 2021-12-03 PROCEDURE — 83735 ASSAY OF MAGNESIUM: CPT

## 2021-12-03 PROCEDURE — 74011636637 HC RX REV CODE- 636/637: Performed by: NURSE PRACTITIONER

## 2021-12-03 PROCEDURE — 74011250636 HC RX REV CODE- 250/636: Performed by: HOSPITALIST

## 2021-12-03 PROCEDURE — 80048 BASIC METABOLIC PNL TOTAL CA: CPT

## 2021-12-03 PROCEDURE — 83605 ASSAY OF LACTIC ACID: CPT

## 2021-12-03 PROCEDURE — 74011250637 HC RX REV CODE- 250/637: Performed by: HOSPITALIST

## 2021-12-03 PROCEDURE — 74011000250 HC RX REV CODE- 250: Performed by: HOSPITALIST

## 2021-12-03 PROCEDURE — 65660000000 HC RM CCU STEPDOWN

## 2021-12-03 PROCEDURE — 85027 COMPLETE CBC AUTOMATED: CPT

## 2021-12-03 PROCEDURE — 74011250636 HC RX REV CODE- 250/636: Performed by: NURSE PRACTITIONER

## 2021-12-03 PROCEDURE — 74011636637 HC RX REV CODE- 636/637: Performed by: HOSPITALIST

## 2021-12-03 PROCEDURE — 99233 SBSQ HOSP IP/OBS HIGH 50: CPT | Performed by: CLINICAL NURSE SPECIALIST

## 2021-12-03 PROCEDURE — 36415 COLL VENOUS BLD VENIPUNCTURE: CPT

## 2021-12-03 RX ORDER — MAGNESIUM SULFATE 100 %
4 CRYSTALS MISCELLANEOUS AS NEEDED
Status: DISCONTINUED | OUTPATIENT
Start: 2021-12-03 | End: 2021-12-06 | Stop reason: HOSPADM

## 2021-12-03 RX ORDER — DEXTROSE 50 % IN WATER (D50W) INTRAVENOUS SYRINGE
12.5-25 AS NEEDED
Status: DISCONTINUED | OUTPATIENT
Start: 2021-12-03 | End: 2021-12-06 | Stop reason: HOSPADM

## 2021-12-03 RX ORDER — INSULIN LISPRO 100 [IU]/ML
4 INJECTION, SOLUTION INTRAVENOUS; SUBCUTANEOUS
Status: DISCONTINUED | OUTPATIENT
Start: 2021-12-03 | End: 2021-12-06 | Stop reason: HOSPADM

## 2021-12-03 RX ORDER — SODIUM CHLORIDE 450 MG/100ML
100 INJECTION, SOLUTION INTRAVENOUS CONTINUOUS
Status: DISCONTINUED | OUTPATIENT
Start: 2021-12-03 | End: 2021-12-05

## 2021-12-03 RX ORDER — HYDRALAZINE HYDROCHLORIDE 20 MG/ML
10 INJECTION INTRAMUSCULAR; INTRAVENOUS
Status: DISCONTINUED | OUTPATIENT
Start: 2021-12-03 | End: 2021-12-06 | Stop reason: HOSPADM

## 2021-12-03 RX ORDER — INSULIN GLARGINE 100 [IU]/ML
20 INJECTION, SOLUTION SUBCUTANEOUS DAILY
Status: DISCONTINUED | OUTPATIENT
Start: 2021-12-03 | End: 2021-12-06

## 2021-12-03 RX ORDER — DEXTROSE, SODIUM CHLORIDE, AND POTASSIUM CHLORIDE 5; .45; .3 G/100ML; G/100ML; G/100ML
INJECTION INTRAVENOUS CONTINUOUS
Status: DISCONTINUED | OUTPATIENT
Start: 2021-12-03 | End: 2021-12-05

## 2021-12-03 RX ORDER — INSULIN LISPRO 100 [IU]/ML
INJECTION, SOLUTION INTRAVENOUS; SUBCUTANEOUS
Status: DISCONTINUED | OUTPATIENT
Start: 2021-12-03 | End: 2021-12-06 | Stop reason: HOSPADM

## 2021-12-03 RX ADMIN — PREGABALIN 75 MG: 25 CAPSULE ORAL at 09:06

## 2021-12-03 RX ADMIN — INSULIN GLARGINE 20 UNITS: 100 INJECTION, SOLUTION SUBCUTANEOUS at 10:37

## 2021-12-03 RX ADMIN — INSULIN LISPRO 4 UNITS: 100 INJECTION, SOLUTION INTRAVENOUS; SUBCUTANEOUS at 17:30

## 2021-12-03 RX ADMIN — Medication 10 ML: at 05:18

## 2021-12-03 RX ADMIN — Medication 10 ML: at 13:06

## 2021-12-03 RX ADMIN — SODIUM CHLORIDE 100 ML/HR: 4.5 INJECTION, SOLUTION INTRAVENOUS at 12:32

## 2021-12-03 RX ADMIN — ONDANSETRON 4 MG: 2 INJECTION INTRAMUSCULAR; INTRAVENOUS at 09:17

## 2021-12-03 RX ADMIN — PREGABALIN 75 MG: 25 CAPSULE ORAL at 17:29

## 2021-12-03 RX ADMIN — PANTOPRAZOLE SODIUM 40 MG: 40 TABLET, DELAYED RELEASE ORAL at 09:06

## 2021-12-03 RX ADMIN — INSULIN LISPRO 3 UNITS: 100 INJECTION, SOLUTION INTRAVENOUS; SUBCUTANEOUS at 22:03

## 2021-12-03 RX ADMIN — DEXTROSE MONOHYDRATE, SODIUM CHLORIDE, AND POTASSIUM CHLORIDE: 50; 4.5; 2.98 INJECTION, SOLUTION INTRAVENOUS at 09:59

## 2021-12-03 RX ADMIN — INSULIN LISPRO 1 UNITS: 100 INJECTION, SOLUTION INTRAVENOUS; SUBCUTANEOUS at 17:30

## 2021-12-03 RX ADMIN — SODIUM CHLORIDE 100 ML/HR: 4.5 INJECTION, SOLUTION INTRAVENOUS at 21:11

## 2021-12-03 RX ADMIN — ROSUVASTATIN CALCIUM 40 MG: 40 TABLET, FILM COATED ORAL at 21:06

## 2021-12-03 RX ADMIN — Medication 10 ML: at 21:09

## 2021-12-03 RX ADMIN — DEXTROSE MONOHYDRATE, SODIUM CHLORIDE, AND POTASSIUM CHLORIDE: 50; 4.5; 2.98 INJECTION, SOLUTION INTRAVENOUS at 03:11

## 2021-12-03 RX ADMIN — ONDANSETRON 4 MG: 2 INJECTION INTRAMUSCULAR; INTRAVENOUS at 17:30

## 2021-12-03 RX ADMIN — Medication 1 AMPULE: at 21:08

## 2021-12-03 RX ADMIN — HEPARIN SODIUM 5000 UNITS: 5000 INJECTION INTRAVENOUS; SUBCUTANEOUS at 21:09

## 2021-12-03 RX ADMIN — HEPARIN SODIUM 5000 UNITS: 5000 INJECTION INTRAVENOUS; SUBCUTANEOUS at 05:18

## 2021-12-03 RX ADMIN — HEPARIN SODIUM 5000 UNITS: 5000 INJECTION INTRAVENOUS; SUBCUTANEOUS at 17:01

## 2021-12-03 NOTE — DIABETES MGMT
[address] [place] [address] NURSE SPECIALIST CONSULT     Initial Presentation   Bethany Villa is a 46 y.o. male admitted 12/2/2021 from the ER with nausea and vomiting x 2 days. Hypothermic, hypotensive and code sepsis care activated. Patient reports not taking insulin for 1 day. BG >500 by EMS. HX:   Past Medical History:   Diagnosis Date    Bipolar 1 disorder, depressed ([address]     Bipolar disorder ([address]     Depression     Diabetes ([address]     DKA, type 1 ([address] 1/27/2013    diagnosed age 23    H/O noncompliance with medical treatment, presenting hazards to health     MRSA (methicillin resistant staph aureus) culture positive     MRSA (methicillin resistant Staphylococcus aureus)     Face    Noncompliance with medication regimen     Second hand smoke exposure     Seizure ([address]     Seizures ([address] 2006 or 2007    one episode during [facility]      INITIAL DX:   DKA (diabetic ketoacidosis) ([address] [E11.10]     Current Treatment     TX: Insulin. BP management. IVF. Clot Prevention. Protonix. Consulted by Provider for advanced diabetes nursing assessment and care for:   [x] Transitioning off [address] [place] [facility]   [x] Inpatient management strategy  [x] Home management assessment    Hospital Course   Clinical progress has been complicated by DKA. Diabetes History   The patient reports a 20 year history of Type 1 diabetes. He has a positive family hx of diabetes (mom & niece). He reports taking 30 units of Lantus daily and 5-10 units of Humalog with meals. The patient wears a Freestyle Pati at home to monitor BG's. He follows with a PCP,  Hyun Perez MD, for diabetes management. He resides in a 1 story home with his mother.      Diabetes-related Medical History  Acute complications  DKA  Neurological complications  Peripheral neuropathy  Microvascular disease  Retinopathy    Diabetes Medication History  Key Antihyperglycemic Medications             insulin glargine (Lantus Solostar U-100 Insulin) 100 unit/mL (3 mL) inpn INJECT 30 UNITS SUBCUTANEOUSLY DAILY    insulin glargine (Lantus U-100 Insulin) 100 unit/mL injection 22 Units by SubCUTAneous route nightly. insulin aspart U-100 (NovoLOG Flexpen U-100 Insulin) 100 unit/mL (3 mL) inpn (Novolog or Humalog, whichever more preferred: Inject 5 units with each meal + correction insulin, up to 30 units per day         Diabetes self-management practices:   Eating pattern   [x] Not eating a carbohydrate-controlled mealplan  [x] Lunch  2 packs of peanut-butter crackers  [x] Dinner  Bag of potato skins  [x] Beverages water  Physical activity pattern   [x] Employing a physical activity program to control BG  Monitoring pattern   [x] Testing BGs sufficiently to inform self-management adjustments (CGM)  Taking medications pattern  [x] Consistent administration  [x] Affordable  Social determinants of health impacting diabetes self-management practices   Concerned that you need to know more about how to stay healthy with diabetes  Overall evaluation:    [x] Improving A1c target with drug therapy & self-care practices    Subjective   I'm still a little nauseous. My father  at the end of October and I wasn't taking care of my self. I missed both kinds of insulin.      Objective   Physical exam  General Normal weight,  male, who is ill-appearing. Cooperative  Neuro  Alert, oriented   Vital Signs   Visit Vitals  /65   Pulse 97   Temp 98.3 °F (36.8 °C)   Resp 18   Ht 5' 9\" (1.753 m)   Wt 68.7 kg (151 lb 7.3 oz)   SpO2 97%   BMI 22.37 kg/m²     Laboratory  Recent Labs     21  0517 21  0127 21  1943 21  1112 21  0843   * 105* 421*   < > 1,098*   AGAP 10 9 21*   < > 40*   WBC  --   --   --   --  18.8*   CREA 2.74* 3.12* 3.47*   < > 3.81*   GFRNA 26* 22* 20*   < > 18*   AST  --   --   --   --  12*   ALT  --   --   --   --  42    < > = values in this interval not displayed.      Factors impacting BG management  Factor Dose Comments   Nutrition:  NPO => advance today   Carb controlled meals  (60gms/meal)   Still abit nauseous   Drugs:  Vasopressor load   Off Levophed    Affects insulin delivery   Other:   Kidney function    GFR 26     BG pattern      Significant diabetes-related events over the past 24-72 hours  12/2/21 Initial BG 1045/AG 40  12/3/21 Used a lot of insulin to bring BGs to target, e.g. 135 units 12/2/21 and 216 units so far today. Still nauseous    Assessment and Plan   Nursing Diagnosis Risk for unstable blood glucose pattern   Nursing Intervention Domain 5256 Decision-making Support   Nursing Interventions Examined current inpatient diabetes/blood glucose control   Explored factors facilitating and impeding inpatient management     Evaluation   This 46year old  male, with Type 1 diabetes, did not achieve diabetes control prior to admission, as evidenced by A1c of 9% (9/23/2021), but that value is much improved from the >14% he had in 8/2020. He admits to not attending to his diabetes after his father's death at the end of October. Positive for cocaine & THC on this admission which adversely impacts insulin delivery. Admission BG of 1045 and AG of 35. Received IVF (total of 4L to date). Started on [employer]; AG closed at [employer] Can transition off Glucostabilizer to basal/bolus/corrective approach, and slowly add meals. Would run non-glucose laden IVFs to rehydrate. During a previous admission in 2020, he required 20 units of basal insulin, and 4 units of Humalog with consumed meals.     Recommendations      [x] Transition off Glucostabilizer with 20 units of Lantus; stop Glucostabilizer & dextrose infusion after 2 hours and start 1/2NS @100cc/hr     [x] Start mealtime insulin - Humalog 4 units with consumed meals    [x] Referral to  [x] Program for Diabetes Health (Phone [phone] to schedule appointment) for [facility]    Billing Code(s)   [x] 37[address] [facility] care - 35 minutes     Before making these care recommendations, I personally reviewed the hospitalization record, including notes, laboratory & diagnostic data and current medications, and examined the patient at the bedside (circumstances permitting) before making care recommendations.      Total minutes: [address], RN, [facility]  Juju Kang, CNS  Diabetes Clinical Nurse Specialist  [facility]  Access via [address] [place] [address]

## 2021-12-03 NOTE — PROGRESS NOTES
2045  Jonel PARKS notified of critical lactic 3.3  0200  Pt c/o being so thirsty. D/w Joao Ugarte NP. Ok to give pt sips of ginger ale.  0220  Pt tolerating sips of ginger ale with no nausea at this time. Dextrose added to IVF per protocol.   D/w Joao Ugarte NP  6398  Levo stopped

## 2021-12-03 NOTE — PROGRESS NOTES
Hospitalist Progress Note    NAME: Harriett Homans   :  1980   MRN:  [id number]       Assessment / Plan:  DKA   -resolved   -start on sliding scale     Acute on chronic renal failure   -continue IV fluid     HTN   -hold antihypertensive med blood pressure low     GERD   -continue PPI    Leukocytosis most likely reactive           18.5 - 24.9 Normal weight / Body mass index is 22.37 kg/m². Estimated discharge date: December 3  Barriers:    Code status: Full  Prophylaxis: SCD's  Recommended Disposition: Home w/Family     Subjective:     Chief Complaint / Reason for Physician Visit  \"\". Discussed with RN events overnight. Review of Systems:  Symptom Y/N Comments  Symptom Y/N Comments   Fever/Chills    Chest Pain     Poor Appetite    Edema     Cough    Abdominal Pain     Sputum    Joint Pain     SOB/JOHNSTON    Pruritis/Rash     Nausea/vomit    Tolerating PT/OT     Diarrhea    Tolerating Diet     Constipation    Other       Could NOT obtain due to:      Objective:     VITALS:   Last 24hrs VS reviewed since prior progress note.  Most recent are:  Patient Vitals for the past 24 hrs:   Temp Pulse Resp BP SpO2   21 1300 99.1 °F (37.3 °C) 97 (!) 35 131/70 96 %   21 1200 98.9 °F (37.2 °C) 99 17 126/71 97 %   21 1100 98.4 °F (36.9 °C) 95 19 128/77 96 %   21 1000 98.1 °F (36.7 °C) 95 15 129/69 92 %   21 0900 98.3 °F (36.8 °C) 97 18 -- 97 %   21 0800 97.7 °F (36.5 °C) (!) 102 16 -- 96 %   21 0700 97.7 °F (36.5 °C) 95 20 131/65 99 %   21 0630 97.6 °F (36.4 °C) 97 10 123/76 97 %   21 0530 98 °F (36.7 °C) 99 26 128/60 100 %   21 0500 98.5 °F (36.9 °C) (!) 102 23 -- 100 %   21 0430 98.4 °F (36.9 °C) 96 15 (!) 120/57 99 %   21 0400 98.2 °F (36.8 °C) 99 15 (!) 127/56 100 %   21 0330 98.2 °F (36.8 °C) (!) 102 18 (!) 151/69 95 %   21 0300 98.3 °F (36.8 °C) 100 17 (!) 141/66 100 %   21 0230 98.3 °F (36.8 °C) 99 17 123/69 99 % 12/03/21 0200 98.4 °F (36.9 °C) 97 18 (!) 120/52 97 %   12/03/21 0130 98.7 °F (37.1 °C) 100 14 (!) 122/58 99 %   12/03/21 0100 98.9 °F (37.2 °C) 100 15 139/73 94 %   12/03/21 0030 99 °F (37.2 °C) 100 25 (!) 110/50 98 %   12/03/21 0000 -- (!) 101 27 (!) 117/48 98 %   12/02/21 2330 -- (!) 105 17 (!) 131/50 100 %   12/02/21 2300 -- (!) 109 22 137/65 100 %   12/02/21 2230 -- (!) 106 17 (!) 125/52 99 %   12/02/21 2200 -- (!) 105 16 (!) 128/52 99 %   12/02/21 2130 -- (!) 102 16 137/61 97 %   12/02/21 2100 -- (!) 110 (!) 37 (!) 119/57 99 %   12/02/21 2030 -- (!) 104 21 120/62 99 %   12/02/21 2000 97.7 °F (36.5 °C) (!) 104 15 116/65 100 %   12/02/21 1930 -- (!) 104 18 138/65 97 %   12/02/21 1900 -- (!) 109 20 128/70 100 %   12/02/21 1845 -- (!) 108 20 129/72 100 %   12/02/21 1830 -- (!) 105 15 115/66 100 %   12/02/21 1815 -- (!) 105 20 (!) 141/67 100 %   12/02/21 1800 -- (!) 107 18 (!) 140/65 100 %   12/02/21 1715 -- (!) 104 (!) 31 109/61 100 %   12/02/21 1700 98.4 °F (36.9 °C) (!) 104 26 113/65 100 %   12/02/21 1645 -- (!) 103 27 105/66 100 %   12/02/21 1630 -- (!) 103 (!) 32 105/60 100 %   12/02/21 1615 -- 100 25 (!) 91/55 100 %   12/02/21 1600 -- 100 21 104/65 100 %   12/02/21 1545 -- 100 (!) 31 106/65 100 %   12/02/21 1530 -- 96 (!) 34 (!) 101/55 100 %   12/02/21 1515 -- 95 24 (!) 93/46 100 %   12/02/21 1500 -- 94 20 (!) 100/43 100 %   12/02/21 1445 -- 95 27 (!) 97/48 100 %       Intake/Output Summary (Last 24 hours) at 12/3/2021 1442  Last data filed at 12/3/2021 0919  Gross per 24 hour   Intake 1716.17 ml   Output 1985 ml   Net -268.83 ml        I had a face to face encounter and independently examined this patient on 12/3/2021, as outlined below:  PHYSICAL EXAM:  General: WD, WN. Alert, cooperative, no acute distress    EENT:  EOMI. Anicteric sclerae. MMM  Resp:  CTA bilaterally, no wheezing or rales. No accessory muscle use  CV:  Regular  rhythm,  No edema  GI:  Soft, Non distended, Non tender.   +Bowel sounds  Neurologic:  Alert and oriented X 3, normal speech,   Psych:   Good insight. Not anxious nor agitated  Skin:  No rashes. No jaundice    Reviewed most current lab test results and cultures  YES  Reviewed most current radiology test results   YES  Review and summation of old records today    NO  Reviewed patient's current orders and MAR    YES  PMH/SH reviewed - no change compared to H&P  ________________________________________________________________________  Care Plan discussed with:    Comments   Patient y    Family      RN y    Care Manager     Consultant                        Multidiciplinary team rounds were held today with , nursing, pharmacist and clinical coordinator. Patient's plan of care was discussed; medications were reviewed and discharge planning was addressed. ________________________________________________________________________  Total NON critical care TIME:  35  Minutes    Total CRITICAL CARE TIME Spent:   Minutes non procedure based      Comments   >50% of visit spent in counseling and coordination of care y    ________________________________________________________________________  Tony Motta MD     Procedures: see electronic medical records for all procedures/Xrays and details which were not copied into this note but were reviewed prior to creation of Plan. LABS:  I reviewed today's most current labs and imaging studies.   Pertinent labs include:  Recent Labs     12/03/21  1049 12/02/21  0843   WBC 16.1* 18.8*   HGB 9.3* 10.9*   HCT 26.5* 35.1*    391     Recent Labs     12/03/21  1038 12/03/21  0517 12/03/21  0127 12/02/21  1611 12/02/21  1112 12/02/21  0843 12/02/21  0843    143 144   < > 134*   < > 128*   K 4.3 3.8 3.5   < > 4.3   < > 5.7*   * 109* 109*   < > 92*   < > 82*   CO2 25 24 26   < > 7*   < > 6*   * 129* 105*   < > 1,045*   < > 1,098*   BUN 44* 54* 58*   < > 75*   < > 76*   CREA 2.48* 2.74* 3.12*   < > 3.55*   < > 3.81*   CA 7.3* 7.6* 7.6*   < > 7.9*   < > 9.0   MG 2.2 2.2 2.2   < > 3.0*   < > 3.3*   PHOS  --   --   --   --  11.0*  --   --    ALB  --   --   --   --   --   --  2.6*   TBILI  --   --   --   --   --   --  0.6   ALT  --   --   --   --   --   --  42    < > = values in this interval not displayed. Signed:  Chayo Diallo MD

## 2021-12-03 NOTE — INTERDISCIPLINARY ROUNDS
Interdisciplinary team rounds were held 12/3/2021 with the following team members:Care Management, Nursing, Nutrition, Occupational Therapy, Pharmacy, Physician . Plan of care discussed. See clinical pathway and/or care plan for interventions and desired outcomes. Goals of the Day: Wean off of Insulin.

## 2021-12-03 NOTE — PROGRESS NOTES
Critical Care Progress Note  Mary Miller MD          Date of Service:  12/3/2021  NAME:  Kaley Grijalva  :  1980  MRN:  [id number]      Subjective/Hospital course:      12/3: Patient reports feeling the same as yesterday, on insulin gtt. Still very nauseous and not able to keep anything down. Off pressors. Problem list:     Problem list:  Hospital Problems  Date Reviewed: 11/15/2021          Codes Class Noted POA    [facility] ICD-10-CM: E11.10  ICD-9-CM: 250.12  2021 Unknown               Assessment:      -DKA. pseudohyponatremia. Actually patient has hypernatremia with corrected sodium of 158.  -Acute renal failure. -Hyperkalemia.  -Leukocytosis. Likely reactive.  -Polysubstance abuse. Positive for cocaine and THC.  -H/o HTN.  -HLD. -Non compliance.  -Peripheral neuropathy. -GERD.     Plan:      -Now AG closed, transition to sub cut insulin.  -continue IVF for now. --Aggressive Rx for nausea. --F/u cultures.-Hold off on antibiotics for now.  -Continue Lipitor.  -Continue Lyrica.  -Hold off on blood pressure medications including lisinopril for now.  -Continue Protonix.  -Closely monitor sodium level.  -Closely monitor urine output, avoid nephrotoxins.        Prophylaxis:  Stress Ulcer Protocol Active: Continue Protonix. DVT Protocol Active: Subcu heparin  CODE STATUS. Full code.     Patient is critically ill and has high chances of further decompensation. Review of Systems:   A comprehensive review of systems was negative except for that written in the HPI.        Vital Signs:     Patient Vitals for the past 4 hrs:   BP Temp Pulse Resp SpO2   21 0900 -- 98.3 °F (36.8 °C) 97 18 97 %   21 0800 -- 97.7 °F (36.5 °C) (!) 102 16 96 %   21 0700 131/65 97.7 °F (36.5 °C) 95 20 99 %   21 0630 123/76 97.6 °F (36.4 °C) 97 10 97 %        Intake/Output Summary (Last 24 hours) at 12/3/2021 6062  Last data filed at 12/3/2021 0451  Gross per 24 hour   Intake 4033.86 ml   Output 4085 ml   Net -51.14 ml        Physical Examination:    Physical Exam  Constitutional:       Appearance: Normal appearance. HENT:      Head: Normocephalic and atraumatic. Mouth/Throat:      Mouth: Mucous membranes are moist.   Eyes:      Extraocular Movements: Extraocular movements intact. Conjunctiva/sclera: Conjunctivae normal.   Cardiovascular:      Rate and Rhythm: Normal rate and regular rhythm. Pulmonary:      Effort: Pulmonary effort is normal. No respiratory distress. Breath sounds: Normal breath sounds. Abdominal:      General: Abdomen is flat. There is no distension. Palpations: Abdomen is soft. Tenderness: There is no abdominal tenderness. There is no guarding. Musculoskeletal:      Cervical back: Normal range of motion and neck supple. Neurological:      Mental Status: He is alert and oriented to person, place, and time. Labs:   Labs and imaging reviewed.       Medications:     Current Facility-Administered Medications   Medication Dose Route Frequency    dextrose 5% - 0.45% NaCl with KCl 40 mEq/L infusion   IntraVENous CONTINUOUS    sodium chloride (NS) flush 5-10 mL  5-10 mL IntraVENous PRN    insulin regular (NOVOLIN R, HUMULIN R) 100 Units in 0.9% sodium chloride 100 mL infusion  0-50 Units/hr IntraVENous TITRATE    insulin lispro (HUMALOG) injection   SubCUTAneous TIDAC    glucose chewable tablet 16 g  4 Tablet Oral PRN    dextrose (D50W) injection syrg 12.5-25 g  25-50 mL IntraVENous PRN    glucagon (GLUCAGEN) injection 1 mg  1 mg IntraMUSCular PRN    sodium chloride (NS) flush 5-40 mL  5-40 mL IntraVENous Q8H    sodium chloride (NS) flush 5-40 mL  5-40 mL IntraVENous PRN    acetaminophen (TYLENOL) tablet 650 mg  650 mg Oral Q6H PRN    Or    acetaminophen (TYLENOL) suppository 650 mg  650 mg Rectal Q6H PRN    polyethylene glycol (MIRALAX) packet 17 g  17 g Oral DAILY PRN    ondansetron (ZOFRAN ODT) tablet 4 mg  4 mg Oral Q8H PRN    Or    ondansetron (ZOFRAN) injection 4 mg  4 mg IntraVENous Q6H PRN    heparin (porcine) injection 5,000 Units  5,000 Units SubCUTAneous Q8H    pantoprazole (PROTONIX) tablet 40 mg  40 mg Oral DAILY    rosuvastatin (CRESTOR) tablet 40 mg  40 mg Oral QHS    pregabalin (LYRICA) capsule 75 mg  75 mg Oral BID    NOREPINephrine (LEVOPHED) 8 mg in 5% dextrose 250mL (32 mcg/mL) infusion  0.5-16 mcg/min IntraVENous TITRATE    alcohol 62% (NOZIN) nasal  1 Ampule  1 Ampule Topical Q12H     ______________________________________________________________________  EXPECTED LENGTH OF STAY: - - -  ACTUAL LENGTH OF STAY:          [address] Prater, MD   Pulmonary/CCM  [address]  [phone]  12/3/2021

## 2021-12-04 ENCOUNTER — APPOINTMENT (OUTPATIENT)
Dept: GENERAL RADIOLOGY | Age: 41
DRG: 420 | End: 2021-12-04
Attending: INTERNAL MEDICINE
Payer: COMMERCIAL

## 2021-12-04 LAB
ANION GAP SERPL CALC-SCNC: 9 MMOL/L (ref 5–15)
BASOPHILS # BLD: 0 K/UL (ref 0–0.1)
BASOPHILS NFR BLD: 0 % (ref 0–1)
BUN SERPL-MCNC: 27 MG/DL (ref 6–20)
BUN/CREAT SERPL: 15 (ref 12–20)
CALCIUM SERPL-MCNC: 8.2 MG/DL (ref 8.5–10.1)
CHLORIDE SERPL-SCNC: 106 MMOL/L (ref 97–108)
CO2 SERPL-SCNC: 23 MMOL/L (ref 21–32)
CREAT SERPL-MCNC: 1.85 MG/DL (ref 0.7–1.3)
DIFFERENTIAL METHOD BLD: ABNORMAL
EOSINOPHIL # BLD: 0 K/UL (ref 0–0.4)
EOSINOPHIL NFR BLD: 0 % (ref 0–7)
ERYTHROCYTE [DISTWIDTH] IN BLOOD BY AUTOMATED COUNT: 14.6 % (ref 11.5–14.5)
GLUCOSE BLD STRIP.AUTO-MCNC: 132 MG/DL (ref 65–117)
GLUCOSE BLD STRIP.AUTO-MCNC: 133 MG/DL (ref 65–117)
GLUCOSE BLD STRIP.AUTO-MCNC: 206 MG/DL (ref 65–117)
GLUCOSE BLD STRIP.AUTO-MCNC: 227 MG/DL (ref 65–117)
GLUCOSE SERPL-MCNC: 246 MG/DL (ref 65–100)
HCT VFR BLD AUTO: 26.8 % (ref 36.6–50.3)
HGB BLD-MCNC: 8.9 G/DL (ref 12.1–17)
IMM GRANULOCYTES # BLD AUTO: 0.1 K/UL (ref 0–0.04)
IMM GRANULOCYTES NFR BLD AUTO: 1 % (ref 0–0.5)
LYMPHOCYTES # BLD: 1.6 K/UL (ref 0.8–3.5)
LYMPHOCYTES NFR BLD: 15 % (ref 12–49)
MCH RBC QN AUTO: 30.1 PG (ref 26–34)
MCHC RBC AUTO-ENTMCNC: 33.2 G/DL (ref 30–36.5)
MCV RBC AUTO: 90.5 FL (ref 80–99)
MONOCYTES # BLD: 0.9 K/UL (ref 0–1)
MONOCYTES NFR BLD: 8 % (ref 5–13)
NEUTS SEG # BLD: 8.4 K/UL (ref 1.8–8)
NEUTS SEG NFR BLD: 76 % (ref 32–75)
NRBC # BLD: 0 K/UL (ref 0–0.01)
NRBC BLD-RTO: 0 PER 100 WBC
PLATELET # BLD AUTO: 270 K/UL (ref 150–400)
PMV BLD AUTO: 10.3 FL (ref 8.9–12.9)
POTASSIUM SERPL-SCNC: 4.1 MMOL/L (ref 3.5–5.1)
RBC # BLD AUTO: 2.96 M/UL (ref 4.1–5.7)
SERVICE CMNT-IMP: ABNORMAL
SODIUM SERPL-SCNC: 138 MMOL/L (ref 136–145)
WBC # BLD AUTO: 11 K/UL (ref 4.1–11.1)

## 2021-12-04 PROCEDURE — 80048 BASIC METABOLIC PNL TOTAL CA: CPT

## 2021-12-04 PROCEDURE — 74011000258 HC RX REV CODE- 258: Performed by: INTERNAL MEDICINE

## 2021-12-04 PROCEDURE — 74011250637 HC RX REV CODE- 250/637: Performed by: HOSPITALIST

## 2021-12-04 PROCEDURE — 36415 COLL VENOUS BLD VENIPUNCTURE: CPT

## 2021-12-04 PROCEDURE — 74011250637 HC RX REV CODE- 250/637: Performed by: INTERNAL MEDICINE

## 2021-12-04 PROCEDURE — 85025 COMPLETE CBC W/AUTO DIFF WBC: CPT

## 2021-12-04 PROCEDURE — 74011250636 HC RX REV CODE- 250/636: Performed by: HOSPITALIST

## 2021-12-04 PROCEDURE — 74011636637 HC RX REV CODE- 636/637: Performed by: HOSPITALIST

## 2021-12-04 PROCEDURE — 74011636637 HC RX REV CODE- 636/637: Performed by: NURSE PRACTITIONER

## 2021-12-04 PROCEDURE — 74018 RADEX ABDOMEN 1 VIEW: CPT

## 2021-12-04 PROCEDURE — 74011250636 HC RX REV CODE- 250/636: Performed by: INTERNAL MEDICINE

## 2021-12-04 PROCEDURE — 65660000000 HC RM CCU STEPDOWN

## 2021-12-04 PROCEDURE — 82962 GLUCOSE BLOOD TEST: CPT

## 2021-12-04 RX ORDER — PROMETHAZINE HYDROCHLORIDE 25 MG/1
25 TABLET ORAL
Status: DISCONTINUED | OUTPATIENT
Start: 2021-12-04 | End: 2021-12-06 | Stop reason: HOSPADM

## 2021-12-04 RX ORDER — CLONIDINE HYDROCHLORIDE 0.1 MG/1
0.1 TABLET ORAL 2 TIMES DAILY
Status: DISCONTINUED | OUTPATIENT
Start: 2021-12-04 | End: 2021-12-06 | Stop reason: HOSPADM

## 2021-12-04 RX ADMIN — HEPARIN SODIUM 5000 UNITS: 5000 INJECTION INTRAVENOUS; SUBCUTANEOUS at 15:25

## 2021-12-04 RX ADMIN — Medication 1 AMPULE: at 08:46

## 2021-12-04 RX ADMIN — INSULIN LISPRO 3 UNITS: 100 INJECTION, SOLUTION INTRAVENOUS; SUBCUTANEOUS at 08:47

## 2021-12-04 RX ADMIN — HYDRALAZINE HYDROCHLORIDE 10 MG: 20 INJECTION INTRAMUSCULAR; INTRAVENOUS at 10:49

## 2021-12-04 RX ADMIN — ROSUVASTATIN CALCIUM 40 MG: 40 TABLET, FILM COATED ORAL at 21:26

## 2021-12-04 RX ADMIN — HEPARIN SODIUM 5000 UNITS: 5000 INJECTION INTRAVENOUS; SUBCUTANEOUS at 21:25

## 2021-12-04 RX ADMIN — Medication 10 ML: at 06:36

## 2021-12-04 RX ADMIN — ONDANSETRON 4 MG: 2 INJECTION INTRAMUSCULAR; INTRAVENOUS at 01:41

## 2021-12-04 RX ADMIN — PREGABALIN 75 MG: 25 CAPSULE ORAL at 08:47

## 2021-12-04 RX ADMIN — CEFTRIAXONE 1 G: 1 INJECTION, POWDER, FOR SOLUTION INTRAMUSCULAR; INTRAVENOUS at 15:24

## 2021-12-04 RX ADMIN — Medication 10 ML: at 15:29

## 2021-12-04 RX ADMIN — Medication 1 AMPULE: at 22:08

## 2021-12-04 RX ADMIN — Medication 10 ML: at 21:23

## 2021-12-04 RX ADMIN — ACETAMINOPHEN 650 MG: 325 TABLET ORAL at 21:26

## 2021-12-04 RX ADMIN — ONDANSETRON 4 MG: 2 INJECTION INTRAMUSCULAR; INTRAVENOUS at 18:06

## 2021-12-04 RX ADMIN — PANTOPRAZOLE SODIUM 40 MG: 40 TABLET, DELAYED RELEASE ORAL at 08:47

## 2021-12-04 RX ADMIN — INSULIN GLARGINE 20 UNITS: 100 INJECTION, SOLUTION SUBCUTANEOUS at 08:46

## 2021-12-04 RX ADMIN — CLONIDINE HYDROCHLORIDE 0.1 MG: 0.1 TABLET ORAL at 17:39

## 2021-12-04 RX ADMIN — PREGABALIN 75 MG: 25 CAPSULE ORAL at 17:39

## 2021-12-04 RX ADMIN — INSULIN LISPRO 3 UNITS: 100 INJECTION, SOLUTION INTRAVENOUS; SUBCUTANEOUS at 13:01

## 2021-12-04 RX ADMIN — HEPARIN SODIUM 5000 UNITS: 5000 INJECTION INTRAVENOUS; SUBCUTANEOUS at 06:36

## 2021-12-04 NOTE — PROGRESS NOTES
TRANSFER - IN REPORT:    Verbal report received from [address] RN(name) on Eliana Cope  being received from CCU(unit) for routine progression of care      Report consisted of patients Situation, Background, Assessment and   Recommendations(SBAR). Information from the following report(s) SBAR and ED Summary was reviewed with the receiving nurse. Opportunity for questions and clarification was provided. Assessment completed upon patients arrival to unit and care assumed.

## 2021-12-04 NOTE — PROGRESS NOTES
Pt getting transferred to  [address]. Report given to Kathy Ray. All belongings in hand:  $300 in cash and was counted in front of pt (pt refusing money to be sent to security at this time). Pants, socks, belt, glucose meter, wallet with debit cards and ID, silver ring, phone. Pt still having nausea/vomiting and poor diet intake. Zofran given prior to transfer. Spoke with Dr Kayleigh Verduzco regarding additional antiemetic.  Awaiting orders

## 2021-12-04 NOTE — PROGRESS NOTES
Hospitalist Progress Note    NAME: Abhinav Thompson   :  1980   MRN:  [id number]       Assessment / Plan:  DKA   -resolved   -start on sliding scale     Acute on chronic renal failure   -continue IV fluid     HTN   -start on clonidine   -hydralazine PRN     GERD   -continue PPI    LF arm swelling ? Cellulitis  -start IV antibiotics rocephin  -venous doppler       Intractable nausea and vomiting   -continue IV fluid  -continue zofran   -continue PPI    - follow up KUB                18.5 - 24.9 Normal weight / Body mass index is 22.37 kg/m². Estimated discharge date: December 3  Barriers:    Code status: Full  Prophylaxis: SCD's  Recommended Disposition: Home w/Family     Subjective:     Chief Complaint / Reason for Physician Visit  \"\". Discussed with RN events overnight. Still complaining from intractable nausea and vomiting , KUB was ordered     Review of Systems:  Symptom Y/N Comments  Symptom Y/N Comments   Fever/Chills n   Chest Pain n    Poor Appetite    Edema     Cough    Abdominal Pain     Sputum    Joint Pain     SOB/JOHNSTON n   Pruritis/Rash     Nausea/vomit    Tolerating PT/OT     Diarrhea    Tolerating Diet y    Constipation n   Other       Could NOT obtain due to:      Objective:     VITALS:   Last 24hrs VS reviewed since prior progress note.  Most recent are:  Patient Vitals for the past 24 hrs:   Temp Pulse Resp BP SpO2   21 0900 98.6 °F (37 °C) 93 25 -- 93 %   21 0800 98.4 °F (36.9 °C) 91 16 (!) 156/76 98 %   21 0700 98.4 °F (36.9 °C) 95 29 -- 95 %   21 0600 98.3 °F (36.8 °C) 91 17 (!) 163/83 98 %   21 0316 98.7 °F (37.1 °C) 94 15 (!) 161/98 93 %   21 0200 98.8 °F (37.1 °C) 98 28 (!) 171/86 98 %   21 0100 99.4 °F (37.4 °C) 91 18 (!) 162/88 96 %   21 0000 99.3 °F (37.4 °C) 94 15 (!) 154/86 91 %   21 2300 99.2 °F (37.3 °C) 97 14 -- 95 %   21 98.9 °F (37.2 °C) 95 27 (!) 158/83 94 %   21 99.2 °F (37.3 °C) 97 15 (!) 140/84 95 %   12/03/21 1930 99 °F (37.2 °C) 96 16 (!) 155/91 97 %   12/03/21 1800 98.6 °F (37 °C) (!) 101 22 (!) 171/86 93 %   12/03/21 1730 98.8 °F (37.1 °C) (!) 103 15 (!) 163/84 92 %   12/03/21 1700 98.8 °F (37.1 °C) 100 15 -- 94 %   12/03/21 1600 99.2 °F (37.3 °C) (!) 105 28 -- 98 %   12/03/21 1500 99.1 °F (37.3 °C) 99 26 -- 97 %   12/03/21 1400 98.7 °F (37.1 °C) (!) 101 18 -- 97 %   12/03/21 1300 99.1 °F (37.3 °C) 97 (!) 35 131/70 96 %   12/03/21 1200 98.9 °F (37.2 °C) 99 17 126/71 97 %   12/03/21 1100 98.4 °F (36.9 °C) 95 19 128/77 96 %   12/03/21 1000 98.1 °F (36.7 °C) 95 15 129/69 92 %       Intake/Output Summary (Last 24 hours) at 12/4/2021 0915  Last data filed at 12/4/2021 7904  Gross per 24 hour   Intake 1813.34 ml   Output 2700 ml   Net -886.66 ml        I had a face to face encounter and independently examined this patient on 12/4/2021, as outlined below:  PHYSICAL EXAM:  General: WD, WN. Alert, cooperative, no acute distress    EENT:  EOMI. Anicteric sclerae. MMM  Resp:  CTA bilaterally, no wheezing or rales. No accessory muscle use  CV:  Regular  rhythm,  No edema  GI:  Soft, Non distended, Non tender. +Bowel sounds  Neurologic:  Alert and oriented X 3, normal speech,   Psych:   Good insight. Not anxious nor agitated  Skin:  No rashes. No jaundice    Reviewed most current lab test results and cultures  YES  Reviewed most current radiology test results   YES  Review and summation of old records today    NO  Reviewed patient's current orders and MAR    YES  PMH/SH reviewed - no change compared to H&P  ________________________________________________________________________  Care Plan discussed with:    Comments   Patient y    Family      RN y    Care Manager     Consultant                        Multidiciplinary team rounds were held today with , nursing, pharmacist and clinical coordinator.   Patient's plan of care was discussed; medications were reviewed and discharge planning was addressed. ________________________________________________________________________  Total NON critical care TIME:  35  Minutes    Total CRITICAL CARE TIME Spent:   Minutes non procedure based      Comments   >50% of visit spent in counseling and coordination of care y    ________________________________________________________________________  Helen Farley MD     Procedures: see electronic medical records for all procedures/Xrays and details which were not copied into this note but were reviewed prior to creation of Plan. LABS:  I reviewed today's most current labs and imaging studies. Pertinent labs include:  Recent Labs     12/04/21  0351 12/03/21  1049 12/02/21  0843   WBC 11.0 16.1* 18.8*   HGB 8.9* 9.3* 10.9*   HCT 26.8* 26.5* 35.1*    327 391     Recent Labs     12/04/21  0351 12/03/21  1038 12/03/21  0517 12/03/21  0127 12/03/21  0127 12/02/21  1611 12/02/21  1112 12/02/21  0843 12/02/21  0843    141 143   < > 144   < > 134*   < > 128*   K 4.1 4.3 3.8   < > 3.5   < > 4.3   < > 5.7*    110* 109*   < > 109*   < > 92*   < > 82*   CO2 23 25 24   < > 26   < > 7*   < > 6*   * 222* 129*   < > 105*   < > 1,045*   < > 1,098*   BUN 27* 44* 54*   < > 58*   < > 75*   < > 76*   CREA 1.85* 2.48* 2.74*   < > 3.12*   < > 3.55*   < > 3.81*   CA 8.2* 7.3* 7.6*   < > 7.6*   < > 7.9*   < > 9.0   MG  --  2.2 2.2  --  2.2   < > 3.0*   < > 3.3*   PHOS  --   --   --   --   --   --  11.0*  --   --    ALB  --   --   --   --   --   --   --   --  2.6*   TBILI  --   --   --   --   --   --   --   --  0.6   ALT  --   --   --   --   --   --   --   --  42    < > = values in this interval not displayed. Signed:  Helen Farley MD

## 2021-12-04 NOTE — PROGRESS NOTES
9778- Report received from [facility] Pt resting comfortably in bed, no complaints. 2120- , no orders for coverage. Message sent via perfect serve. 0700- Pt rested well throughout night, no complaints or issues. Report given to [facility].

## 2021-12-04 NOTE — PROGRESS NOTES
Received message from patient's nurse stating:    Pt transitioned off insulin drip this afternoon. Orders for 4units lispro 3xday with meals. No sliding scale or bedtime insulin. Pt did receive 20units lantus this am. Pt continues to be very nauseous and has not been eating yet. There's no order for POC checks, I just checked one now. Dayshift checked him at mealtimes. Discussion / orders:    Reviewed patient record and medication list.   Ordered before meals and at bedtime blood glucose monitoring with sliding scale coverage           Please note that this note was dictated using Dragon computer voice recognition software. Quite often unanticipated grammatical, syntax, homophones, and other interpretive errors are inadvertently transcribed by the computer software. Please disregard these errors. Please excuse any errors that have escaped final proofreading.      Signed by:  Ramon Leigh DNP, ACNP-BC

## 2021-12-05 LAB
ANION GAP SERPL CALC-SCNC: 6 MMOL/L (ref 5–15)
BASOPHILS # BLD: 0 K/UL (ref 0–0.1)
BASOPHILS NFR BLD: 0 % (ref 0–1)
BUN SERPL-MCNC: 17 MG/DL (ref 6–20)
BUN/CREAT SERPL: 11 (ref 12–20)
CALCIUM SERPL-MCNC: 8.5 MG/DL (ref 8.5–10.1)
CHLORIDE SERPL-SCNC: 107 MMOL/L (ref 97–108)
CO2 SERPL-SCNC: 25 MMOL/L (ref 21–32)
CREAT SERPL-MCNC: 1.51 MG/DL (ref 0.7–1.3)
DIFFERENTIAL METHOD BLD: ABNORMAL
EOSINOPHIL # BLD: 0.1 K/UL (ref 0–0.4)
EOSINOPHIL NFR BLD: 1 % (ref 0–7)
ERYTHROCYTE [DISTWIDTH] IN BLOOD BY AUTOMATED COUNT: 13.8 % (ref 11.5–14.5)
GLUCOSE BLD STRIP.AUTO-MCNC: 135 MG/DL (ref 65–117)
GLUCOSE BLD STRIP.AUTO-MCNC: 140 MG/DL (ref 65–117)
GLUCOSE BLD STRIP.AUTO-MCNC: 226 MG/DL (ref 65–117)
GLUCOSE BLD STRIP.AUTO-MCNC: 274 MG/DL (ref 65–117)
GLUCOSE SERPL-MCNC: 111 MG/DL (ref 65–100)
HCT VFR BLD AUTO: 27.9 % (ref 36.6–50.3)
HGB BLD-MCNC: 9.3 G/DL (ref 12.1–17)
IMM GRANULOCYTES # BLD AUTO: 0 K/UL (ref 0–0.04)
IMM GRANULOCYTES NFR BLD AUTO: 0 % (ref 0–0.5)
LYMPHOCYTES # BLD: 2.8 K/UL (ref 0.8–3.5)
LYMPHOCYTES NFR BLD: 39 % (ref 12–49)
MCH RBC QN AUTO: 30.2 PG (ref 26–34)
MCHC RBC AUTO-ENTMCNC: 33.3 G/DL (ref 30–36.5)
MCV RBC AUTO: 90.6 FL (ref 80–99)
MONOCYTES # BLD: 0.6 K/UL (ref 0–1)
MONOCYTES NFR BLD: 8 % (ref 5–13)
NEUTS SEG # BLD: 3.7 K/UL (ref 1.8–8)
NEUTS SEG NFR BLD: 52 % (ref 32–75)
NRBC # BLD: 0 K/UL (ref 0–0.01)
NRBC BLD-RTO: 0 PER 100 WBC
PLATELET # BLD AUTO: 279 K/UL (ref 150–400)
PMV BLD AUTO: 10 FL (ref 8.9–12.9)
POTASSIUM SERPL-SCNC: 3.4 MMOL/L (ref 3.5–5.1)
RBC # BLD AUTO: 3.08 M/UL (ref 4.1–5.7)
SERVICE CMNT-IMP: ABNORMAL
SODIUM SERPL-SCNC: 138 MMOL/L (ref 136–145)
WBC # BLD AUTO: 7.3 K/UL (ref 4.1–11.1)

## 2021-12-05 PROCEDURE — 74011636637 HC RX REV CODE- 636/637: Performed by: HOSPITALIST

## 2021-12-05 PROCEDURE — 85025 COMPLETE CBC W/AUTO DIFF WBC: CPT

## 2021-12-05 PROCEDURE — 65660000000 HC RM CCU STEPDOWN

## 2021-12-05 PROCEDURE — 74011250637 HC RX REV CODE- 250/637: Performed by: HOSPITALIST

## 2021-12-05 PROCEDURE — 74011250636 HC RX REV CODE- 250/636: Performed by: HOSPITALIST

## 2021-12-05 PROCEDURE — 74011636637 HC RX REV CODE- 636/637: Performed by: NURSE PRACTITIONER

## 2021-12-05 PROCEDURE — 74011250636 HC RX REV CODE- 250/636: Performed by: INTERNAL MEDICINE

## 2021-12-05 PROCEDURE — 80048 BASIC METABOLIC PNL TOTAL CA: CPT

## 2021-12-05 PROCEDURE — 74011000250 HC RX REV CODE- 250: Performed by: HOSPITALIST

## 2021-12-05 PROCEDURE — 82962 GLUCOSE BLOOD TEST: CPT

## 2021-12-05 PROCEDURE — 74011250637 HC RX REV CODE- 250/637: Performed by: INTERNAL MEDICINE

## 2021-12-05 PROCEDURE — 36415 COLL VENOUS BLD VENIPUNCTURE: CPT

## 2021-12-05 PROCEDURE — 74011000258 HC RX REV CODE- 258: Performed by: INTERNAL MEDICINE

## 2021-12-05 RX ADMIN — INSULIN GLARGINE 20 UNITS: 100 INJECTION, SOLUTION SUBCUTANEOUS at 09:50

## 2021-12-05 RX ADMIN — PANTOPRAZOLE SODIUM 40 MG: 40 TABLET, DELAYED RELEASE ORAL at 09:50

## 2021-12-05 RX ADMIN — PREGABALIN 75 MG: 25 CAPSULE ORAL at 17:54

## 2021-12-05 RX ADMIN — HYDRALAZINE HYDROCHLORIDE 10 MG: 20 INJECTION INTRAMUSCULAR; INTRAVENOUS at 12:22

## 2021-12-05 RX ADMIN — CLONIDINE HYDROCHLORIDE 0.1 MG: 0.1 TABLET ORAL at 09:50

## 2021-12-05 RX ADMIN — ROSUVASTATIN CALCIUM 40 MG: 40 TABLET, FILM COATED ORAL at 22:29

## 2021-12-05 RX ADMIN — PREGABALIN 75 MG: 25 CAPSULE ORAL at 09:50

## 2021-12-05 RX ADMIN — CEFTRIAXONE 1 G: 1 INJECTION, POWDER, FOR SOLUTION INTRAMUSCULAR; INTRAVENOUS at 15:41

## 2021-12-05 RX ADMIN — INSULIN LISPRO 4 UNITS: 100 INJECTION, SOLUTION INTRAVENOUS; SUBCUTANEOUS at 17:54

## 2021-12-05 RX ADMIN — Medication 10 ML: at 15:41

## 2021-12-05 RX ADMIN — HEPARIN SODIUM 5000 UNITS: 5000 INJECTION INTRAVENOUS; SUBCUTANEOUS at 05:18

## 2021-12-05 RX ADMIN — INSULIN LISPRO 2 UNITS: 100 INJECTION, SOLUTION INTRAVENOUS; SUBCUTANEOUS at 12:23

## 2021-12-05 RX ADMIN — Medication 1 AMPULE: at 22:28

## 2021-12-05 RX ADMIN — ONDANSETRON 4 MG: 2 INJECTION INTRAMUSCULAR; INTRAVENOUS at 05:17

## 2021-12-05 RX ADMIN — INSULIN LISPRO 2 UNITS: 100 INJECTION, SOLUTION INTRAVENOUS; SUBCUTANEOUS at 22:29

## 2021-12-05 RX ADMIN — HEPARIN SODIUM 5000 UNITS: 5000 INJECTION INTRAVENOUS; SUBCUTANEOUS at 15:40

## 2021-12-05 RX ADMIN — CLONIDINE HYDROCHLORIDE 0.1 MG: 0.1 TABLET ORAL at 17:54

## 2021-12-05 RX ADMIN — HEPARIN SODIUM 5000 UNITS: 5000 INJECTION INTRAVENOUS; SUBCUTANEOUS at 22:29

## 2021-12-05 RX ADMIN — INSULIN LISPRO 5 UNITS: 100 INJECTION, SOLUTION INTRAVENOUS; SUBCUTANEOUS at 17:53

## 2021-12-05 RX ADMIN — Medication 10 ML: at 05:18

## 2021-12-05 RX ADMIN — SODIUM CHLORIDE 100 ML/HR: 4.5 INJECTION, SOLUTION INTRAVENOUS at 01:03

## 2021-12-05 NOTE — PROGRESS NOTES
End of Shift Note    Bedside shift change report given to  (oncoming nurse) by Ruy Schwab (offgoing nurse). Report included the following information SBAR and Kardex    Shift worked:  2430-4118     Shift summary and any significant changes:    Pt had nausea during the shift that required one dose of PRN zofran   Concerns for physician to address:    Zone phone for oncoming shift:       Activity:  Activity Level: Bed Rest  Number times ambulated in hallways past shift: 0  Number of times OOB to chair past shift: 0    Cardiac:   Cardiac Monitoring: Yes      Cardiac Rhythm: Sinus Rhythm    Access:   Current line(s): PIV     Genitourinary:   Urinary status: voiding    Respiratory:   O2 Device: None (Room air)  Chronic home O2 use?: no   Incentive spirometer at bedside: NO     GI:  Last Bowel Movement Date: 12/04/21  Current diet:  ADULT DIET Regular; 4 carb choices (60 gm/meal)  Passing flatus: YES  Tolerating current diet: YES       Pain Management:   Patient states pain is manageable on current regimen: YES    Skin:  Corey Score: 20  Interventions: increase time out of bed    Patient Safety:  Fall Score:  Total Score: 3  Interventions: gripper socks and pt to call before getting OOB  High Fall Risk: Yes    Length of Stay:  Expected LOS: - - -  [facility]

## 2021-12-05 NOTE — PROGRESS NOTES
Problem: Diabetes Self-Management  Goal: *Disease process and treatment process  Description: Define diabetes and identify own type of diabetes; list 3 options for treating diabetes. Outcome: Progressing Towards Goal  Goal: *Incorporating nutritional management into lifestyle  Description: Describe effect of type, amount and timing of food on blood glucose; list 3 methods for planning meals. Outcome: Progressing Towards Goal  Goal: *Incorporating physical activity into lifestyle  Description: State effect of exercise on blood glucose levels. Outcome: Progressing Towards Goal  Goal: *Developing strategies to promote health/change behavior  Description: Define the ABC's of diabetes; identify appropriate screenings, schedule and personal plan for screenings. Outcome: Progressing Towards Goal  Goal: *Using medications safely  Description: State effect of diabetes medications on diabetes; name diabetes medication taking, action and side effects. Outcome: Progressing Towards Goal  Goal: *Monitoring blood glucose, interpreting and using results  Description: Identify recommended blood glucose targets  and personal targets. Outcome: Progressing Towards Goal  Goal: *Prevention, detection, treatment of acute complications  Description: List symptoms of hyper- and hypoglycemia; describe how to treat low blood sugar and actions for lowering  high blood glucose level. Outcome: Progressing Towards Goal  Goal: *Prevention, detection and treatment of chronic complications  Description: Define the natural course of diabetes and describe the relationship of blood glucose levels to long term complications of diabetes.   Outcome: Progressing Towards Goal  Goal: *Developing strategies to address psychosocial issues  Description: Describe feelings about living with diabetes; identify support needed and support network  Outcome: Progressing Towards Goal  Goal: *Insulin pump training  Outcome: Progressing Towards Goal  Goal: *Sick day guidelines  Outcome: Progressing Towards Goal  Goal: *Patient Specific Goal (EDIT GOAL, INSERT TEXT)  Outcome: Progressing Towards Goal     Problem: Patient Education: Go to Patient Education Activity  Goal: Patient/Family Education  Outcome: Progressing Towards Goal     Problem: Falls - Risk of  Goal: *Absence of Falls  Description: Document Gwendolyn Hearn Fall Risk and appropriate interventions in the flowsheet. Outcome: Progressing Towards Goal  Note: Fall Risk Interventions:  Mobility Interventions: Bed/chair exit alarm, Patient to call before getting OOB         Medication Interventions: Bed/chair exit alarm, Patient to call before getting OOB, Teach patient to arise slowly    Elimination Interventions: Bed/chair exit alarm, Call light in reach, Patient to call for help with toileting needs, Urinal in reach, Toileting schedule/hourly rounds              Problem: Patient Education: Go to Patient Education Activity  Goal: Patient/Family Education  Outcome: Progressing Towards Goal     Problem: Pressure Injury - Risk of  Goal: *Prevention of pressure injury  Description: Document Corey Scale and appropriate interventions in the flowsheet.   Outcome: Progressing Towards Goal  Note: Pressure Injury Interventions:       Moisture Interventions: Absorbent underpads, Minimize layers, Offer toileting Q_hr    Activity Interventions: PT/OT evaluation    Mobility Interventions: HOB 30 degrees or less, PT/OT evaluation    Nutrition Interventions: Document food/fluid/supplement intake, Offer support with meals,snacks and hydration    Friction and Shear Interventions: Minimize layers                Problem: Patient Education: Go to Patient Education Activity  Goal: Patient/Family Education  Outcome: Progressing Towards Goal

## 2021-12-05 NOTE — PROGRESS NOTES
Problem: Diabetes Self-Management  Goal: *Disease process and treatment process  Description: Define diabetes and identify own type of diabetes; list 3 options for treating diabetes. Outcome: Progressing Towards Goal  Goal: *Incorporating nutritional management into lifestyle  Description: Describe effect of type, amount and timing of food on blood glucose; list 3 methods for planning meals. Outcome: Progressing Towards Goal  Goal: *Incorporating physical activity into lifestyle  Description: State effect of exercise on blood glucose levels. Outcome: Progressing Towards Goal  Goal: *Developing strategies to promote health/change behavior  Description: Define the ABC's of diabetes; identify appropriate screenings, schedule and personal plan for screenings. Outcome: Progressing Towards Goal  Goal: *Using medications safely  Description: State effect of diabetes medications on diabetes; name diabetes medication taking, action and side effects. Outcome: Progressing Towards Goal  Goal: *Monitoring blood glucose, interpreting and using results  Description: Identify recommended blood glucose targets  and personal targets. Outcome: Progressing Towards Goal  Goal: *Prevention, detection, treatment of acute complications  Description: List symptoms of hyper- and hypoglycemia; describe how to treat low blood sugar and actions for lowering  high blood glucose level. Outcome: Progressing Towards Goal  Goal: *Prevention, detection and treatment of chronic complications  Description: Define the natural course of diabetes and describe the relationship of blood glucose levels to long term complications of diabetes.   Outcome: Progressing Towards Goal  Goal: *Developing strategies to address psychosocial issues  Description: Describe feelings about living with diabetes; identify support needed and support network  Outcome: Progressing Towards Goal  Goal: *Insulin pump training  Outcome: Progressing Towards Goal  Goal: *Sick day guidelines  Outcome: Progressing Towards Goal  Goal: *Patient Specific Goal (EDIT GOAL, INSERT TEXT)  Outcome: Progressing Towards Goal     Problem: Patient Education: Go to Patient Education Activity  Goal: Patient/Family Education  Outcome: Progressing Towards Goal     Problem: Falls - Risk of  Goal: *Absence of Falls  Description: Document Carolyn Mccracken Fall Risk and appropriate interventions in the flowsheet. Outcome: Progressing Towards Goal  Note: Fall Risk Interventions:  Mobility Interventions: Bed/chair exit alarm, Assess mobility with egress test, Patient to call before getting OOB         Medication Interventions: Assess postural VS orthostatic hypotension, Bed/chair exit alarm, Patient to call before getting OOB, Teach patient to arise slowly    Elimination Interventions: Bed/chair exit alarm, Call light in reach, Patient to call for help with toileting needs, Toilet paper/wipes in reach, Toileting schedule/hourly rounds              Problem: Patient Education: Go to Patient Education Activity  Goal: Patient/Family Education  Outcome: Progressing Towards Goal     Problem: Pressure Injury - Risk of  Goal: *Prevention of pressure injury  Description: Document Corey Scale and appropriate interventions in the flowsheet.   Outcome: Progressing Towards Goal  Note: Pressure Injury Interventions:       Moisture Interventions: Absorbent underpads, Apply protective barrier, creams and emollients    Activity Interventions: Assess need for specialty bed, Increase time out of bed, Pressure redistribution bed/mattress(bed type), PT/OT evaluation    Mobility Interventions: HOB 30 degrees or less, Pressure redistribution bed/mattress (bed type), PT/OT evaluation    Nutrition Interventions: Document food/fluid/supplement intake    Friction and Shear Interventions: Apply protective barrier, creams and emollients, HOB 30 degrees or less, Minimize layers                Problem: Patient Education: Go to Patient Education Activity  Goal: Patient/Family Education  Outcome: Progressing Towards Goal

## 2021-12-05 NOTE — PROGRESS NOTES
0700: Bedside shift change report given to ASAD Ghotra (oncoming nurse) by Salma Barrera RN (offgoing nurse). Report included the following information SBAR, Kardex, ED Summary and Intake/Output. 1230: Pt better able to tolerate meals and feeling less nauseated. BP was 170/112, PRN Hydralazine was given and BP went down to 151/89. Plan is to possibly go home tomorrow. 1900:   End of Shift Note    Bedside shift change report given to ASAD Mccoy (oncoming nurse) by Amari León RN (offgoing nurse). Report included the following information SBAR, Kardex, ED Summary and Intake/Output    Shift worked:  Day     Shift summary and any significant changes:     No other significant changes     Concerns for physician to address:       Zone phone for oncoming shift:          Activity:  Activity Level: Up with Assistance  Number times ambulated in hallways past shift: 0  Number of times OOB to chair past shift: 2    Cardiac:   Cardiac Monitoring: Yes      Cardiac Rhythm: Sinus Rhythm    Access:   Current line(s): PIV     Genitourinary:   Urinary status: voiding    Respiratory:   O2 Device: None (Room air)  Chronic home O2 use?: N/A  Incentive spirometer at bedside: N/A     GI:  Last Bowel Movement Date: 12/04/21  Current diet:  ADULT DIET Regular; 4 carb choices (60 gm/meal)  Passing flatus: YES  Tolerating current diet: YES       Pain Management:   Patient states pain is manageable on current regimen: N/A    Skin:  Corey Score: 20  Interventions: increase time out of bed and PT/OT consult    Patient Safety:  Fall Score:  Total Score: 3  Interventions: bed/chair alarm, gripper socks and pt to call before getting OOB  High Fall Risk: Yes    Length of Stay:  Expected LOS: - - -  Actual LOS: 3      Amari León RN

## 2021-12-05 NOTE — PROGRESS NOTES
End of Shift Note    Bedside shift change report given to [address] [place] [address] Road, RN (oncoming nurse) by Eduardo Alvarez RN (offgoing nurse). Report included the following information SBAR    Shift worked:  Days     Shift summary and any significant changes:     Patient transferred from CCU. Patient has $300 cash in wallet along with other personal items in cabinet next to TV. Right IV access was burning during infusion of IV fluids. PICC team placed new IV in left forearm. Left forearm to hand is swollen and painful. Concerns for physician to address:       Zone phone for oncoming shift:          Activity:  Activity Level: Bed Rest  Number times ambulated in hallways past shift: 0  Number of times OOB to chair past shift: 0    Cardiac:   Cardiac Monitoring: Yes      Cardiac Rhythm: Sinus Rhythm    Access:   Current line(s): PIV     Genitourinary:   Urinary status: voiding    Respiratory:   O2 Device: None (Room air)  Chronic home O2 use?: NO  Incentive spirometer at bedside: NO     GI:  Last Bowel Movement Date: 12/04/21  Current diet:  ADULT DIET Regular; 4 carb choices (60 gm/meal)  Passing flatus: YES  Tolerating current diet: NO       Pain Management:   Patient states pain is manageable on current regimen: NO    Skin:  Corey Score: 18  Interventions: nutritional support     Patient Safety:  Fall Score:  Total Score: 3  Interventions: bed/chair alarm  High Fall Risk: Yes    Length of Stay:  Expected LOS: - - -  Actual LOS: 2      Eduardo Alvarez RN

## 2021-12-05 NOTE — PROGRESS NOTES
Hospitalist Progress Note    NAME: Harriett Homans   :  1980   MRN:  [id number]       Assessment / Plan:  DKA   -resolved   -start on sliding scale   -On Lantus 20 units along with Humalog. Acute on chronic renal failure   -Resolved, DC the IV fluids today. HTN   -start on clonidine   -hydralazine PRN     GERD   -continue PPI    LF arm swelling ? Cellulitis  -Improved markedly, ultrasound study pending. Patient is on Rocephin started yesterday, will transition to doxycycline on discharge. Intractable nausea and vomiting   -KUB negative.  -Zofran as needed. -If patient tolerated diet well then can be discharged. 18.5 - 24.9 Normal weight / Body mass index is 22.98 kg/m². Estimated discharge date:   Barriers: Tolerating oral diet    Code status: Full  Prophylaxis: SCD's  Recommended Disposition: Home w/Family     Subjective:     Chief Complaint / Reason for Physician Visit  Patient seen today, still complaining of the nausea and episode of vomiting with breakfast.  No abdominal pain, no fever, no chills, no other complaints. Review of Systems:  Symptom Y/N Comments  Symptom Y/N Comments   Fever/Chills n   Chest Pain n    Poor Appetite    Edema     Cough    Abdominal Pain     Sputum    Joint Pain     SOB/JOHNSTON n   Pruritis/Rash     Nausea/vomit    Tolerating PT/OT     Diarrhea    Tolerating Diet y    Constipation n   Other       Could NOT obtain due to:      Objective:     VITALS:   Last 24hrs VS reviewed since prior progress note.  Most recent are:  Patient Vitals for the past 24 hrs:   Temp Pulse Resp BP SpO2   21 0800 98.4 °F (36.9 °C) 90 12 (!) 173/98 95 %   21 0244 98.1 °F (36.7 °C) 89 19 (!) 146/90 97 %   21 2240 98.7 °F (37.1 °C) 81 14 (!) 155/88 98 %   21 1920 98.7 °F (37.1 °C) 92 14 (!) 146/90 97 %   21 1739 -- 95 -- (!) 192/99 --   21 1534 98.5 °F (36.9 °C) 98 18 (!) 179/103 98 %       Intake/Output Summary (Last 24 hours) at 12/5/2021 1153  Last data filed at 12/4/2021 1739  Gross per 24 hour   Intake --   Output 950 ml   Net -950 ml        I had a face to face encounter and independently examined this patient on 12/5/2021, as outlined below:  PHYSICAL EXAM:  General: WD, WN. Alert, cooperative, no acute distress    EENT:  EOMI. Anicteric sclerae. MMM  Resp:  CTA bilaterally, no wheezing or rales. No accessory muscle use  CV:  Regular  rhythm,  No edema  GI:  Soft, Non distended, Non tender. +Bowel sounds  Neurologic:  Alert and oriented X 3, normal speech,   Psych:   Good insight. Not anxious nor agitated  Skin:  No rashes. No jaundice    Reviewed most current lab test results and cultures  YES  Reviewed most current radiology test results   YES  Review and summation of old records today    NO  Reviewed patient's current orders and MAR    YES  PMH/ reviewed - no change compared to H&P  ________________________________________________________________________  Care Plan discussed with:    Comments   Patient y    Family      RN y    Care Manager     Consultant                        Multidiciplinary team rounds were held today with , nursing, pharmacist and clinical coordinator. Patient's plan of care was discussed; medications were reviewed and discharge planning was addressed. ________________________________________________________________________  Total NON critical care TIME:  35  Minutes    Total CRITICAL CARE TIME Spent:   Minutes non procedure based      Comments   >50% of visit spent in counseling and coordination of care y    ________________________________________________________________________  Scot Major MD     Procedures: see electronic medical records for all procedures/Xrays and details which were not copied into this note but were reviewed prior to creation of Plan. LABS:  I reviewed today's most current labs and imaging studies.   Pertinent labs include:  Recent Labs 12/05/21  0253 12/04/21  0351 12/03/21  1049   WBC 7.3 11.0 16.1*   HGB 9.3* 8.9* 9.3*   HCT 27.9* 26.8* 26.5*    270 327     Recent Labs     12/05/21  0253 12/04/21  0351 12/03/21  1038 12/03/21  0517 12/03/21  0517 12/03/21  0127 12/03/21  0127    138 141   < > 143   < > 144   K 3.4* 4.1 4.3   < > 3.8   < > 3.5    106 110*   < > 109*   < > 109*   CO2 25 23 25   < > 24   < > 26   * 246* 222*   < > 129*   < > 105*   BUN 17 27* 44*   < > 54*   < > 58*   CREA 1.51* 1.85* 2.48*   < > 2.74*   < > 3.12*   CA 8.5 8.2* 7.3*   < > 7.6*   < > 7.6*   MG  --   --  2.2  --  2.2  --  2.2    < > = values in this interval not displayed.        Signed: Pako Duke MD

## 2021-12-06 ENCOUNTER — TELEPHONE (OUTPATIENT)
Dept: PRIMARY CARE CLINIC | Age: 41
End: 2021-12-06

## 2021-12-06 ENCOUNTER — APPOINTMENT (OUTPATIENT)
Dept: VASCULAR SURGERY | Age: 41
DRG: 420 | End: 2021-12-06
Attending: INTERNAL MEDICINE
Payer: COMMERCIAL

## 2021-12-06 VITALS
RESPIRATION RATE: 13 BRPM | TEMPERATURE: 98.3 F | BODY MASS INDEX: 22.6 KG/M2 | SYSTOLIC BLOOD PRESSURE: 162 MMHG | DIASTOLIC BLOOD PRESSURE: 100 MMHG | OXYGEN SATURATION: 93 % | HEIGHT: 69 IN | WEIGHT: 152.56 LBS | HEART RATE: 90 BPM

## 2021-12-06 LAB
GLUCOSE BLD STRIP.AUTO-MCNC: 326 MG/DL (ref 65–117)
SERVICE CMNT-IMP: ABNORMAL

## 2021-12-06 PROCEDURE — 74011636637 HC RX REV CODE- 636/637: Performed by: HOSPITALIST

## 2021-12-06 PROCEDURE — 74011250636 HC RX REV CODE- 250/636: Performed by: HOSPITALIST

## 2021-12-06 PROCEDURE — 74011636637 HC RX REV CODE- 636/637: Performed by: NURSE PRACTITIONER

## 2021-12-06 PROCEDURE — 74011250637 HC RX REV CODE- 250/637: Performed by: INTERNAL MEDICINE

## 2021-12-06 PROCEDURE — 74011250637 HC RX REV CODE- 250/637: Performed by: HOSPITALIST

## 2021-12-06 PROCEDURE — 93971 EXTREMITY STUDY: CPT

## 2021-12-06 PROCEDURE — 82962 GLUCOSE BLOOD TEST: CPT

## 2021-12-06 PROCEDURE — 74011636637 HC RX REV CODE- 636/637: Performed by: STUDENT IN AN ORGANIZED HEALTH CARE EDUCATION/TRAINING PROGRAM

## 2021-12-06 RX ORDER — CLONIDINE HYDROCHLORIDE 0.1 MG/1
0.1 TABLET ORAL 2 TIMES DAILY
Qty: 60 TABLET | Refills: 0 | Status: SHIPPED | OUTPATIENT
Start: 2021-12-06 | End: 2022-01-05

## 2021-12-06 RX ORDER — INSULIN ASPART 100 [IU]/ML
8 INJECTION, SOLUTION INTRAVENOUS; SUBCUTANEOUS
Qty: 10 ADJUSTABLE DOSE PRE-FILLED PEN SYRINGE | Refills: 3 | Status: ON HOLD | OUTPATIENT
Start: 2021-12-06 | End: 2022-02-14 | Stop reason: SDUPTHER

## 2021-12-06 RX ORDER — INSULIN GLARGINE 100 [IU]/ML
30 INJECTION, SOLUTION SUBCUTANEOUS DAILY
Status: DISCONTINUED | OUTPATIENT
Start: 2021-12-06 | End: 2021-12-06 | Stop reason: HOSPADM

## 2021-12-06 RX ORDER — PANTOPRAZOLE SODIUM 40 MG/1
40 TABLET, DELAYED RELEASE ORAL DAILY
Qty: 30 TABLET | Refills: 0 | Status: SHIPPED | OUTPATIENT
Start: 2021-12-06 | End: 2022-06-22

## 2021-12-06 RX ORDER — ROSUVASTATIN CALCIUM 40 MG/1
40 TABLET, COATED ORAL
Qty: 30 TABLET | Refills: 0 | Status: SHIPPED | OUTPATIENT
Start: 2021-12-06 | End: 2022-01-27 | Stop reason: SDUPTHER

## 2021-12-06 RX ADMIN — INSULIN LISPRO 4 UNITS: 100 INJECTION, SOLUTION INTRAVENOUS; SUBCUTANEOUS at 08:47

## 2021-12-06 RX ADMIN — Medication 10 ML: at 05:28

## 2021-12-06 RX ADMIN — Medication 10 ML: at 03:10

## 2021-12-06 RX ADMIN — PANTOPRAZOLE SODIUM 40 MG: 40 TABLET, DELAYED RELEASE ORAL at 08:48

## 2021-12-06 RX ADMIN — CLONIDINE HYDROCHLORIDE 0.1 MG: 0.1 TABLET ORAL at 08:48

## 2021-12-06 RX ADMIN — INSULIN GLARGINE 30 UNITS: 100 INJECTION, SOLUTION SUBCUTANEOUS at 08:47

## 2021-12-06 RX ADMIN — INSULIN LISPRO 7 UNITS: 100 INJECTION, SOLUTION INTRAVENOUS; SUBCUTANEOUS at 08:48

## 2021-12-06 RX ADMIN — HEPARIN SODIUM 5000 UNITS: 5000 INJECTION INTRAVENOUS; SUBCUTANEOUS at 05:28

## 2021-12-06 RX ADMIN — PREGABALIN 75 MG: 25 CAPSULE ORAL at 08:48

## 2021-12-06 NOTE — PROGRESS NOTES
End of Shift Note    Bedside shift change report given to [address] (oncoming nurse) by Leighann Begum (offgoing nurse). Report included the following information SBAR and Kardex    Shift worked:  0667-3005     Shift summary and any significant changes:    Patient tolerated dinner and snacks with no nausea the entire shift. Concerns for physician to address: none   Zone phone for oncoming shift:       Activity:  Activity Level: Up with Assistance  Number times ambulated in hallways past shift: 0  Number of times OOB to chair past shift: 0    Cardiac:   Cardiac Monitoring: Yes      Cardiac Rhythm: Sinus Rhythm    Access:   Current line(s): PIV     Genitourinary:   Urinary status: voiding    Respiratory:   O2 Device: None (Room air)  Chronic home O2 use?: YES  Incentive spirometer at bedside: N/A     GI:  Last Bowel Movement Date: 12/04/21  Current diet:  ADULT DIET Regular; 4 carb choices (60 gm/meal)  Passing flatus: YES  Tolerating current diet: YES       Pain Management:   Patient states pain is manageable on current regimen: YES    Skin:  Corey Score: 20  Interventions: increase time out of bed    Patient Safety:  Fall Score:  Total Score: 3  Interventions: gripper socks  High Fall Risk: Yes    Length of Stay:  Expected LOS: - - -  Actual LOS: [facility]

## 2021-12-06 NOTE — PROGRESS NOTES
0730 Bedside shift change report given to [address] (oncoming nurse) by Nawaf Cabral RN (offgoing nurse). Report included the following information SBAR, Kardex and ED Summary.

## 2021-12-06 NOTE — PROGRESS NOTES
DISCHARGE SUMMARY FROM [facility] NURSE    The patient is stable for discharge. I have reviewed the discharge instructions with the patient. The patient verbalized understanding. All questions were fully answered. The patient verbalized no complaints. Hard scripts and medication handouts were given and reviewed with the patient. Appropriate educational materials and medication side effects teaching were provided also provided. Cardiac monitor and IV line(s) were removed. The following personal items collected during admission were returned to the patient/family  Home medications: None  Dental Appliance: Dental Appliances: None  Vision: Visual Aid: Glasses, At home  Hearing Aid:    Jewelry: Jewelry: Necklace, Ring, Bracelet (white metal)  Clothing: Clothing: Socks, Pants, Belt, Shirt  Other Valuables: Other Valuables: Cell Phone  Valuables sent to safe: Personal Items Sent to Safe:  (Refused to allow check of wallet)    There were no personal belongings, valuables or home medications left at patient's bedside,  or safe.

## 2021-12-06 NOTE — DISCHARGE SUMMARY
Hospitalist Discharge Summary     Patient ID:  Robert Granados  [id number]  53 y.o.  1980 12/2/2021    PCP on record: Teodora Patel MD    Admit date: 12/2/2021  Discharge date and time: 12/6/2021    DISCHARGE DIAGNOSIS:    DKA  Acute on chronic kidney disease  Hypertension  GERD  Left arm swelling  Intractable nausea and vomiting      CONSULTATIONS:  None    Excerpted HPI from H&P of Lori Rodrigez MD:  This is 60-year-old  male with history of type 1 diabetes mellitus. He presented to emergency department with complaint of intractable nausea and vomiting that started yesterday. He also has history of bipolar disorder. He reports that yesterday he did not take his insulin because he could not eat anything because of nausea and vomiting. He was found to have blood sugar more than 8000, bicarb of 5 and pH of 7.1. He is going to be admitted to ICU for further care and monitoring.    ______________________________________________________________________  DISCHARGE SUMMARY/HOSPITAL COURSE:  for full details see H&P, daily progress notes, labs, consult notes. DKA   -resolved   -start on sliding scale   -On Lantus 20 units along with Humalog.     Acute on chronic renal failure   -Resolved, DC the IV fluids.     HTN   -Resume lisinopril along with clonidine  -hydralazine PRN      GERD   -continue PPI     LF arm swelling ? Cellulitis  -Ultrasound negative. Discontinue the antibiotics.      Intractable nausea and vomiting   -KUB negative.  -Zofran as needed.  -Patient tolerated diet well, no more nausea and vomiting.           _______________________________________________________________________  Patient seen and examined by me on discharge day. Pertinent Findings:  Gen:    Not in distress  Chest: Clear lungs  CVS:   Regular rhythm.   No edema  Abd:  Soft, not distended, not tender  Neuro:  Alert, _______________________________________________________________________  DISCHARGE MEDICATIONS:   Discharge Medication List as of 12/6/2021  9:58 AM      START taking these medications    Details   cloNIDine HCL (CATAPRES) 0.1 mg tablet Take 1 Tablet by mouth two (2) times a day for 30 days. , Normal, Disp-60 Tablet, R-0         CONTINUE these medications which have CHANGED    Details   rosuvastatin (CRESTOR) 40 mg tablet Take 1 Tablet by mouth nightly., Normal, Disp-30 Tablet, R-0      pantoprazole (Protonix) 40 mg tablet Take 1 Tablet by mouth daily. , Normal, Disp-30 Tablet, R-0         CONTINUE these medications which have NOT CHANGED    Details   insulin glargine (Lantus Solostar U-100 Insulin) 100 unit/mL (3 mL) inpn INJECT 30 UNITS SUBCUTANEOUSLY DAILY, Normal, Disp-30 mL, R-4      ergocalciferol (ERGOCALCIFEROL) 1,250 mcg (50,000 unit) capsule Take 1 capsule by mouth once a week, Normal, Disp-12 Capsule, R-0      pregabalin (Lyrica) 75 mg capsule Take 75 mg by mouth two (2) times a day., Historical Med      lisinopriL (PRINIVIL, ZESTRIL) 20 mg tablet Take 1 Tablet by mouth daily. , Normal, Disp-30 Tablet, R-3      insulin aspart U-100 (NovoLOG Flexpen U-100 Insulin) 100 unit/mL (3 mL) inpn (Novolog or Humalog, whichever more preferred: Inject 5 units with each meal + correction insulin, up to 30 units per day, Normal, Disp-10 Adjustable Dose Pre-filled Pen Syringe, R-3         STOP taking these medications       ondansetron (Zofran ODT) 4 mg disintegrating tablet Comments:   Reason for Stopping:         naloxone (Narcan) 4 mg/actuation nasal spray Comments:   Reason for Stopping:         docusate sodium (Colace) 100 mg capsule Comments:   Reason for Stopping:         clotrimazole (LOTRIMIN) 1 % topical cream Comments:   Reason for Stopping:         metoprolol tartrate (LOPRESSOR) 25 mg tablet Comments:   Reason for Stopping:         furosemide (Lasix) 40 mg tablet Comments:   Reason for Stopping: sildenafil citrate (VIAGRA) 50 mg tablet Comments:   Reason for Stopping:                 Patient Follow Up Instructions: Activity: Activity as tolerated  Diet: Diabetic Diet  Wound Care: None needed    Please follow-up with your primary care doctor for further fine-tuning of blood pressure medications. If you have questions regarding the hospital related prescriptions or hospital related issues please call [phone] [facility] at . You can always direct your questions to your primary care doctor if you are unable to reach your hospital physician; your PCP works as an extension of your hospital doctor just like your hospital doctor is an extension of your PCP for your time at [facility]. If you experience any of the following symptoms then please call your primary care physician or return to the emergency room if you cannot get hold of your doctor:  Fever, chills, nausea, vomiting, diarrhea, change in mentation, falling, bleeding, shortness of breath    Follow-up Information     Follow up With Specialties Details Why Varun Nunez MD Internal Medicine  Your doctor will call you to schedule an appointment.  [address] [place] [address]  [phone]          ________________________________________________________________    Risk of deterioration: Moderate    Condition at Discharge:  Stable  __________________________________________________________________    Disposition  Home with family, no needs    ____________________________________________________________________    Code Status: Full Code  ___________________________________________________________________      Total time in minutes spent coordinating this discharge (includes going over instructions, follow-up, prescriptions, and preparing report for sign off to her PCP) :  >30 minutes    Signed:  No Pickett MD

## 2021-12-06 NOTE — DIABETES MGMT
[address] [place] [address] NURSE SPECIALIST CONSULT     Initial Presentation   Daly Wang is a 46 y.o. male admitted 12/2/2021 from the ER with nausea and vomiting x 2 days. Hypothermic, hypotensive and code sepsis care activated. Patient reports not taking insulin for 1 day. BG >500 by EMS. HX:   Past Medical History:   Diagnosis Date    Bipolar 1 disorder, depressed ([address]     Bipolar disorder ([address]     Depression     Diabetes ([address]     DKA, type 1 ([address] 1/27/2013    diagnosed age 23    H/O noncompliance with medical treatment, presenting hazards to health     MRSA (methicillin resistant staph aureus) culture positive     MRSA (methicillin resistant Staphylococcus aureus)     Face    Noncompliance with medication regimen     Second hand smoke exposure     Seizure ([address]     Seizures ([address] 2006 or 2007    one episode during [facility]      INITIAL DX:   DKA (diabetic ketoacidosis) ([address] [E11.10]     Current Treatment     TX: Insulin. BP management. IVF. Clot Prevention. Protonix. Consulted by Provider for advanced diabetes nursing assessment and care for:   [x] Transitioning off Diogo Alar   [x] Inpatient management strategy  [x] Home management assessment    Hospital Course   Clinical progress has been complicated by DKA. Diabetes History   The patient reports a 20 year history of Type 1 diabetes. He has a positive family hx of diabetes (mom & niece). He reports taking 30 units of Lantus daily and 5-10 units of Humalog with meals. The patient wears a Freestyle Pati at home to monitor BG's. He follows with a PCP,  Gisela Nicolas MD, for diabetes management. He resides in a 1 story home with his mother.      Diabetes-related Medical History  Acute complications  DKA  Neurological complications  Peripheral neuropathy  Microvascular disease  Retinopathy    Diabetes Medication History  Key Antihyperglycemic Medications               insulin glargine (Lantus Solostar U-100 Insulin) 100 unit/mL (3 mL) inpn INJECT 30 UNITS SUBCUTANEOUSLY DAILY    insulin aspart U-100 (NovoLOG Flexpen U-100 Insulin) 100 unit/mL (3 mL) inpn (Novolog or Humalog, whichever more preferred: Inject 5 units with each meal + correction insulin, up to 30 units per day           Diabetes self-management practices:   Eating pattern   [x] Not eating a carbohydrate-controlled mealplan  [x] Lunch  2 packs of peanut-butter crackers  [x] Dinner  Bag of potato skins  [x] Beverages water  Physical activity pattern   [x] Employing a physical activity program to control BG  Monitoring pattern   [x] Testing BGs sufficiently to inform self-management adjustments (CGM)  Taking medications pattern  [x] Consistent administration  [x] Affordable  Social determinants of health impacting diabetes self-management practices   Concerned that you need to know more about how to stay healthy with diabetes  Overall evaluation:    [x] Improving A1c target with drug therapy & self-care practices    Subjective   I'm still a little nauseous. My father  at the end of October and I wasn't taking care of my self. I missed both kinds of insulin.      Objective   Physical exam  General Normal weight,  male, who is ill-appearing.  Cooperative  Neuro  Alert, oriented   Vital Signs   Visit Vitals  BP (!) 162/100   Pulse 90   Temp 98.3 °F (36.8 °C)   Resp 13   Ht 5' 9\" (1.753 m)   Wt 69.2 kg (152 lb 8.9 oz)   SpO2 93%   BMI 22.53 kg/m²     Laboratory  Recent Labs     21  0253 21  0351   * 246*   AGAP 6 9   WBC 7.3 11.0   CREA 1.51* 1.85*   GFRNA 52* 41*     Factors impacting BG management  Factor Dose Comments   Nutrition:  NPO => advance today   Carb controlled meals  (60gms/meal)   Still abit nauseous   Drugs:  Vasopressor load   Off Levophed    Affects insulin delivery   Other:   Kidney function    GFR 26     BG pattern      Significant diabetes-related events over the past 24-72 hours  21 Initial BG 1045/AG 40  12/3/21 Used a lot of insulin to bring BGs to target, e.g. 135 units 12/2/21 and 216 units so far today. Still nauseous    Assessment and Plan   Nursing Diagnosis Risk for unstable blood glucose pattern   Nursing Intervention Domain 9356 Decision-making Support   Nursing Interventions Examined current inpatient diabetes/blood glucose control   Explored factors facilitating and impeding inpatient management     Evaluation   This 46year old  male, with Type 1 diabetes, did not achieve diabetes control prior to admission, as evidenced by A1c of 9% (9/23/2021), but that value is much improved from the >14% he had in 8/2020. He admits to not attending to his diabetes after his father's death at the end of October. Positive for cocaine & THC on this admission which adversely impacts insulin delivery. Admission BG of 1045 and AG of 35. Received IVF (total of 4L to date). Started on [address] AG closed at [employer] Can transition off Glucostabilizer to basal/bolus/corrective approach, and slowly add meals. Would run non-glucose laden IVFs to rehydrate. During a previous admission in 2020, he required 20 units of basal insulin, and 4 units of Humalog with consumed meals. Discharge Recommendations      [x] Lantus 30 units daily     [x] Humalog 8 units with meals    [x] Referral to  [x] [facility] (Phone [phone] to schedule appointment) for [facility]    Billing Code(s)   [x] 79099 IP subsequent hospital care - 15 minutes     Before making these care recommendations, I personally reviewed the hospitalization record, including notes, laboratory & diagnostic data and current medications, and examined the patient at the bedside (circumstances permitting) before making care recommendations.      Total minutes: 5200 Naheed Rene RN, [facility]  Juju Kang, CNS  Diabetes Clinical Nurse Specialist  [facility]  Access via [address] [place] [address]

## 2021-12-06 NOTE — DISCHARGE INSTRUCTIONS
HOSPITALIST DISCHARGE INSTRUCTIONS    NAME: Herbert Castañeda   :  1980   MRN:  [id number]     Date/Time:  2021 11:10 AM    ADMIT DATE: 2021   DISCHARGE DATE: 2021     Attending Physician: Ingris Forbes MD    DISCHARGE DIAGNOSIS:  DKA  Acute on chronic kidney disease  Hypertension  GERD  Left arm swelling  Intractable nausea and vomiting    MEDICATIONS:  See above    · It is important that you take the medication exactly as they are prescribed. · Keep your medication in the bottles provided by the pharmacist and keep a list of the medication names, dosages, and times to be taken in your wallet. · Do not take other medications without consulting your doctor. Pain Management: per above medications    What to do at Home    Recommended diet:  Diabetic Diet    Recommended activity: Activity as tolerated    If you have questions regarding the hospital related prescriptions or hospital related issues please call [facility] at . You can always direct your questions to your primary care doctor if you are unable to reach your hospital physician; your PCP works as an extension of your hospital doctor just like your hospital doctor is an extension of your PCP for your time at [address] If you experience any of the following symptoms then please call your primary care physician or return to the emergency room if you cannot get hold of your doctor:  Fever, chills, nausea, vomiting, diarrhea, change in mentation, falling, bleeding, shortness of breath    Additional Instructions:      Bring these papers with you to your follow up appointments. The papers will help your doctors be sure to continue the care plan from the hospital.              Information obtained by :  I understand that if any problems occur once I am at home I am to contact my physician. I understand and acknowledge receipt of the instructions indicated above. Physician's or R.N.'s Signature                                                                  Date/Time                                                                                                                                              Patient or Representative Signature                                                          Da

## 2021-12-06 NOTE — PROGRESS NOTES
Transition of Care Plan:     RUR: 18% moderate risk  Disposition: Home with family assistance  Follow up appointments:  PCP, Specialists  DME needed:none identified  Transportation at Discharge: other relative, Hesham Carey at approx 11am  [place] or means to access home: yes     IM Medicare Letter: n/a  Is patient a BCPI-A Bundle:    n/a                  If yes, was Bundle Letter given?:    Is patient a  and connected with the [facility]?n/a  If yes, was [employer] transfer form completed and [facility] notified? Caregiver:  Hesham Carey  [phone]     Discharge Caregiver contacted prior to discharge? CM will contact prior to d/c if pt desires. Initial note:  Chart reviewed. CM met with pt to discuss d/c. He is agreeable to d/c. Pts daughters mother will be here to pick him up around 6. CM attempted to make a f/u appt but there are no appts available before Jan 17. The office will call the pt to schedule a f/u. Pt is ready for d/c from a CM standpoint. Care Management Interventions  PCP Verified by CM: Yes  Palliative Care Criteria Met (RRAT>21 & CHF Dx)?: No  Mode of Transport at Discharge:  Other (see comment) (his daughters mother will transport)  Transition of Care Consult (CM Consult): Discharge Planning  Discharge Durable Medical Equipment: No  Physical Therapy Consult: No  Occupational Therapy Consult: No  Speech Therapy Consult: No  Support Systems: Child(sivakumar), Other Family Member(s)  Confirm Follow Up Transport: Family  The Patient and/or Patient Representative was Provided with a Choice of Provider and Agrees with the Discharge Plan?: No  Freedom of Choice List was Provided with Basic Dialogue that Supports the Patient's Individualized Plan of Care/Goals, Treatment Preferences and Shares the Quality Data Associated with the Providers?: No   Resource Information Provided?: No  Discharge Location  Discharge Placement: Home     Pierre Buckley, MSN  Care Manager  [phone]

## 2021-12-06 NOTE — TELEPHONE ENCOUNTER
----- Message from Negar Karmen sent at 12/6/2021  9:08 AM EST -----  Subject: Appointment Request    Reason for Call: Routine Hospital Follow Up    QUESTIONS  Type of Appointment? Established Patient  Reason for appointment request? Available appointments did not meet   patient need  Additional Information for Provider? [facility] follow Up within a week. Please call back with   any soon availability.   ---------------------------------------------------------------------------  --------------  CALL BACK INFO  What is the best way for the office to contact you? OK to leave message on   voicemail  Preferred Call Back Phone Number? [phone]  ---------------------------------------------------------------------------  --------------  SCRIPT ANSWERS  Relationship to Patient? Third Party  Representative Name? [facility]  (Patient requests to see provider urgently. )? No  (Has the patient been discharged from the hospital within 2 business days   AND does not have a Telephone Encounter  Follow Up From [address] [place] [address]   documented in [address]?)? No  Do you have any questions for your primary care provider that need to be   answered prior to your appointment? (Use RN Triage if question pertains to   anything on the red flag list)? No  (Patient needs follow up visit after hospital discharge) Book first   available appointment within 7 days OF DISCHARGE, if no appt, proceed to   book the next available time slot within 14 days OF DISCHARGE AND Send   Message to Provider. [facility] Follow Up appointment cannot be booked   beyond 14 Days and should result in a Message to Provider. ? Yes   Have you been diagnosed with, awaiting test results for, or told that you   are suspected of having COVID-19 (Coronavirus)? (If patient has tested   negative or was tested as a requirement for work, school, or travel and   not based on symptoms, answer no)? No  Within the past two weeks have you developed any of the following symptoms   (answer no if symptoms have been present longer than 2 weeks or began   more than 2 weeks ago)? Fever or Chills, Cough, Shortness of breath or   difficulty breathing, Loss of taste or smell, Sore throat, Nasal   congestion, Sneezing or runny nose, Fatigue or generalized body aches   (answer no if pain is specific to a body part e.g. back pain), Diarrhea,   Headache? No  Have you had close contact with someone with COVID-19 in the last 14 days? No  (Service Expert  click yes below to proceed with [employer] As Usual   Scheduling)?  Yes

## 2021-12-06 NOTE — PROGRESS NOTES
Problem: Diabetes Self-Management  Goal: *Disease process and treatment process  Description: Define diabetes and identify own type of diabetes; list 3 options for treating diabetes. Outcome: Progressing Towards Goal  Goal: *Incorporating nutritional management into lifestyle  Description: Describe effect of type, amount and timing of food on blood glucose; list 3 methods for planning meals. Outcome: Progressing Towards Goal  Goal: *Incorporating physical activity into lifestyle  Description: State effect of exercise on blood glucose levels. Outcome: Progressing Towards Goal  Goal: *Developing strategies to promote health/change behavior  Description: Define the ABC's of diabetes; identify appropriate screenings, schedule and personal plan for screenings. Outcome: Progressing Towards Goal  Goal: *Using medications safely  Description: State effect of diabetes medications on diabetes; name diabetes medication taking, action and side effects. Outcome: Progressing Towards Goal  Goal: *Monitoring blood glucose, interpreting and using results  Description: Identify recommended blood glucose targets  and personal targets. Outcome: Progressing Towards Goal  Goal: *Prevention, detection, treatment of acute complications  Description: List symptoms of hyper- and hypoglycemia; describe how to treat low blood sugar and actions for lowering  high blood glucose level.   Outcome: Progressing Towards Goal

## 2021-12-08 LAB
BACTERIA SPEC CULT: NORMAL
BACTERIA SPEC CULT: NORMAL
SERVICE CMNT-IMP: NORMAL
SERVICE CMNT-IMP: NORMAL

## 2021-12-13 ENCOUNTER — OFFICE VISIT (OUTPATIENT)
Dept: PRIMARY CARE CLINIC | Age: 41
End: 2021-12-13
Payer: MEDICAID

## 2021-12-13 VITALS
SYSTOLIC BLOOD PRESSURE: 147 MMHG | WEIGHT: 152.2 LBS | RESPIRATION RATE: 18 BRPM | BODY MASS INDEX: 22.54 KG/M2 | TEMPERATURE: 97.1 F | OXYGEN SATURATION: 100 % | DIASTOLIC BLOOD PRESSURE: 84 MMHG | HEART RATE: 86 BPM | HEIGHT: 69 IN

## 2021-12-13 DIAGNOSIS — D75.839 THROMBOCYTOSIS: Primary | ICD-10-CM

## 2021-12-13 DIAGNOSIS — E87.5 HYPERKALEMIA: ICD-10-CM

## 2021-12-13 DIAGNOSIS — I10 ESSENTIAL HYPERTENSION: ICD-10-CM

## 2021-12-13 DIAGNOSIS — E78.00 HYPERCHOLESTEREMIA: ICD-10-CM

## 2021-12-13 DIAGNOSIS — E10.49 OTHER DIABETIC NEUROLOGICAL COMPLICATION ASSOCIATED WITH TYPE 1 DIABETES MELLITUS (HCC): ICD-10-CM

## 2021-12-13 DIAGNOSIS — E10.65 HYPERGLYCEMIA DUE TO TYPE 1 DIABETES MELLITUS (HCC): Primary | ICD-10-CM

## 2021-12-13 DIAGNOSIS — D72.829 LEUKOCYTOSIS, UNSPECIFIED TYPE: ICD-10-CM

## 2021-12-13 DIAGNOSIS — R74.8 ELEVATED ALKALINE PHOSPHATASE LEVEL: ICD-10-CM

## 2021-12-13 DIAGNOSIS — L03.90 CELLULITIS, UNSPECIFIED CELLULITIS SITE: ICD-10-CM

## 2021-12-13 PROCEDURE — 99215 OFFICE O/P EST HI 40 MIN: CPT | Performed by: INTERNAL MEDICINE

## 2021-12-13 RX ORDER — DULOXETIN HYDROCHLORIDE 60 MG/1
60 CAPSULE, DELAYED RELEASE ORAL DAILY
Qty: 30 CAPSULE | Refills: 3 | Status: SHIPPED | OUTPATIENT
Start: 2021-12-13 | End: 2022-06-22

## 2021-12-13 NOTE — PROGRESS NOTES
Topher Roa is a 46 y.o.  male and presents with     Chief Complaint   Patient presents with   [facility] Follow Up    Leg Pain     bilateral leg pain, pain level 10/10     Transition of care  Admit date: 12/2/2021  Discharge date and time: 12/6/2021  [facility] for admission - vomiting  Discharge diagnosis - DKA, cellulitis      HIstroy pt was admitted to hospital with symptoms of nasuea, vomiting  Pt was found to be in DKA. Pt reports that he was taking his insulin  Pt has Type 1 DM  Pt also had swelling of left upper arm that was treated with abx in the hosptial.  It s unclear if cellitits preciptated the DKA. Pts white count was elevated. Pt repots that not changes were made to his inuslin regmen  Pt used to see Dr Lyssa Tinoco but he retired. Pt has sever neuropathy in feet. Lyrica does not seem to he helping  Pt has tried Gabapentin in the past  Pt does feel anxious and depressed. Pt is taking Lantus 30 units daily. HUmalog per sliding scale. ,avergae 15-20 units three times daily. Pt has freestyle ,but has not checked it this morning. Past Medical History:   Diagnosis Date    Bipolar 1 disorder, depressed ([address]     Bipolar disorder ([address]     Depression     Diabetes ([address]     DKA, type 1 ([address] 1/27/2013    diagnosed age 23    H/O noncompliance with medical treatment, presenting hazards to health     MRSA (methicillin resistant staph aureus) culture positive     MRSA (methicillin resistant Staphylococcus aureus)     Face    Noncompliance with medication regimen     Second hand smoke exposure     Seizure ([address]     Seizures ([address] 2006 or 2007    one episode during half-way     Past Surgical History:   Procedure Laterality Date    HX HEENT      top left wisdom tooth    HX ORTHOPAEDIC Left     wrist; MCV    UPPER GI ENDOSCOPY,BIOPSY  11/20/2018          Current Outpatient Medications   Medication Sig    DULoxetine (CYMBALTA) 60 mg capsule Take 1 Capsule by mouth daily.     cloNIDine HCL (CATAPRES) 0.1 mg tablet Take 1 Tablet by mouth two (2) times a day for 30 days.  rosuvastatin (CRESTOR) 40 mg tablet Take 1 Tablet by mouth nightly.  pantoprazole (Protonix) 40 mg tablet Take 1 Tablet by mouth daily.  insulin aspart U-100 (NovoLOG Flexpen U-100 Insulin) 100 unit/mL (3 mL) inpn 8 Units by SubCUTAneous route Before breakfast, lunch, and dinner. (Novolog or Humalog, whichever more preferred: Inject 5 units with each meal + correction insulin, up to 30 units per day    insulin glargine (Lantus Solostar U-100 Insulin) 100 unit/mL (3 mL) inpn INJECT 30 UNITS SUBCUTANEOUSLY DAILY    ergocalciferol (ERGOCALCIFEROL) 1,250 mcg (50,000 unit) capsule Take 1 capsule by mouth once a week    pregabalin (Lyrica) 75 mg capsule Take 75 mg by mouth two (2) times a day.  lisinopriL (PRINIVIL, ZESTRIL) 20 mg tablet Take 1 Tablet by mouth daily. No current facility-administered medications for this visit. Health Maintenance   Topic Date Due    Foot Exam Q1  03/07/2021    Flu Vaccine (1) Never done    A1C test (Diabetic or Prediabetic)  03/02/2022    MICROALBUMIN Q1  04/01/2022    COVID-19 Vaccine (3 - Booster for Moderna series) 04/13/2022    Eye Exam Retinal or Dilated  06/18/2022    Lipid Screen  11/15/2022    DTaP/Tdap/Td series (2 - Td or Tdap) 03/09/2028    Hepatitis C Screening  Completed    Pneumococcal 0-64 years  Aged [employer] History   Administered Date(s) Administered    (RETIRED) Pneumococcal Vaccine (Unspecified Type) 08/18/2011    COVID-19, Vickey Casey, Primary or Immunocompromised Series, MRNA, PF, 100mcg/0.5mL 09/11/2021, 10/13/2021    MMR 03/23/1995    Pneumococcal Polysaccharide (PPSV-23) 08/18/2011    Rabies Vaccine IM 09/17/1992    TD Vaccine 03/08/2011    Tdap 03/09/2018     No LMP for male patient.         Allergies and Intolerances:   No Known Allergies    Family History:   Family History   Problem Relation Age of Onset    Diabetes Mother     Diabetes Other         neice, type 1        Social History:   He  reports that he quit smoking about 21 months ago. His smoking use included cigarettes. He started smoking about 22 years ago. He has a 1.60 pack-year smoking history. He has never used smokeless tobacco.  He  reports no history of alcohol use. Review of Systems:   General: negative for - chills, fatigue, fever, weight change  Psych: negative for - anxiety, depression, irritability or mood swings  ENT: negative for - headaches, hearing change, nasal congestion, oral lesions, sneezing or sore throat  Heme/ Lymph: negative for - bleeding problems, bruising, pallor or swollen lymph nodes  Endo: negative for - hot flashes, polydipsia/polyuria or temperature intolerance  Resp: negative for - cough, shortness of breath or wheezing  CV: negative for - chest pain, edema or palpitations  GI: negative for - abdominal pain, change in bowel habits, constipation, diarrhea or nausea/vomiting  : negative for - dysuria, hematuria, incontinence, pelvic pain or vulvar/vaginal symptoms  MSK: negative for - joint pain, joint swelling or muscle pain  Neuro: negative for - confusion, headaches, seizures or weakness  Derm: negative for - dry skin, hair changes, rash or skin lesion changes          Physical:   Vitals:   Vitals:    12/13/21 1034   BP: (!) 147/84   Pulse: 86   Resp: 18   Temp: 97.1 °F (36.2 °C)   TempSrc: Temporal   SpO2: 100%   Weight: 152 lb 3.2 oz (69 kg)   Height: 5' 9\" (1.753 m)           Exam:   HEENT- atraumatic,normocephalic, awake, oriented, well nourished  Neck - supple,no enlarged lymph nodes, no JVD, no thyromegaly  Chest- CTA, no rhonchi, no crackles  Heart- rrr, no murmurs / gallop/rub  Abdomen- soft,BS+,NT, no hepatosplenomegaly  Ext - no c/c/edema   Neuro- no focal deficits. Power 5/5 all extremities  Skin - warm,dry, no obvious rashes.           Review of Data:   LABS:   Lab Results   Component Value Date/Time    WBC 7.3 12/05/2021 02:53 AM    HGB 9.3 (L) 12/05/2021 02:53 AM    HCT 27.9 (L) 12/05/2021 02:53 AM    PLATELET 997 25/04/7655 02:53 AM     Lab Results   Component Value Date/Time    Sodium 138 12/05/2021 02:53 AM    Potassium 3.4 (L) 12/05/2021 02:53 AM    Chloride 107 12/05/2021 02:53 AM    CO2 25 12/05/2021 02:53 AM    Glucose 111 (H) 12/05/2021 02:53 AM    BUN 17 12/05/2021 02:53 AM    Creatinine 1.51 (H) 12/05/2021 02:53 AM     Lab Results   Component Value Date/Time    Cholesterol, total 192 11/15/2021 12:01 PM    HDL Cholesterol 61 11/15/2021 12:01 PM    LDL, calculated 89.4 11/15/2021 12:01 PM    Triglyceride 208 (H) 11/15/2021 12:01 PM     No components found for: GPT        Impression / Plan:        ICD-10-CM ICD-9-CM    1. Hyperglycemia due to type 1 diabetes mellitus (HCC)  X17.00 888.09 METABOLIC PANEL, COMPREHENSIVE      CBC WITH AUTOMATED DIFF      REFERRAL TO ENDOCRINOLOGY   2. Essential hypertension  I10 401.9    3. Hypercholesteremia  E78.00 272.0    4. Cellulitis, unspecified cellulitis site  L03.90 682.9    5. Other diabetic neurological complication associated with type 1 diabetes mellitus (HCC)  E10.49 250.61 REFERRAL TO NEUROLOGY      DULoxetine (CYMBALTA) 60 mg capsule   6. Leukocytosis, unspecified type  D72.829 288.60      DKA most likley precipitated by left upper ext cellulitis. Explained to patient risk benefits of the medications. Advised patient to stop meds if having any side effects. Pt verbalized understanding of the instructions. I have discussed the diagnosis with the patient and the intended plan as seen in the above orders. The patient has received an after-visit summary and questions were answered concerning future plans. I have discussed medication side effects and warnings with the patient as well. I have reviewed the plan of care with the patient, accepted their input and they are in agreement with the treatment goals. Reviewed plan of care.  Patient has provided input and agrees with goals. Follow-up and Dispositions    · Return in about 2 months (around 2/13/2022).          Rona Fernandez MD

## 2021-12-13 NOTE — PROGRESS NOTES
Chief Complaint   Patient presents with   [facility] Follow Up    Leg Pain     bilateral leg pain, pain level 10/10        Visit Vitals  BP (!) 147/84 (BP 1 Location: Right upper arm, BP Patient Position: Sitting)   Pulse 86   Temp 97.1 °F (36.2 °C) (Temporal)   Resp 18   Ht 5' 9\" (1.753 m)   Wt 152 lb 3.2 oz (69 kg)   SpO2 100%   BMI 22.48 kg/m²        1. Have you been to the ER, urgent care clinic since your last visit? Hospitalized since your last visit? Yes ER follow up 12/2/2021 [facility]   2. Have you seen or consulted any other health care providers outside of the [address] [place] [address] since your last visit? Include any pap smears or colon screening.  No

## 2021-12-28 ENCOUNTER — HOSPITAL ENCOUNTER (EMERGENCY)
Age: 41
Discharge: HOME | End: 2021-12-28
Attending: EMERGENCY MEDICINE
Payer: MEDICAID

## 2021-12-28 ENCOUNTER — APPOINTMENT (OUTPATIENT)
Dept: CT IMAGING | Age: 41
End: 2021-12-28
Attending: EMERGENCY MEDICINE
Payer: MEDICAID

## 2021-12-28 VITALS
DIASTOLIC BLOOD PRESSURE: 96 MMHG | HEIGHT: 68 IN | BODY MASS INDEX: 23.64 KG/M2 | OXYGEN SATURATION: 96 % | SYSTOLIC BLOOD PRESSURE: 164 MMHG | RESPIRATION RATE: 17 BRPM | WEIGHT: 156 LBS | TEMPERATURE: 98.2 F | HEART RATE: 104 BPM

## 2021-12-28 DIAGNOSIS — R11.2 NON-INTRACTABLE VOMITING WITH NAUSEA, UNSPECIFIED VOMITING TYPE: ICD-10-CM

## 2021-12-28 DIAGNOSIS — N17.9 AKI (ACUTE KIDNEY INJURY) (HCC): ICD-10-CM

## 2021-12-28 DIAGNOSIS — N13.30 HYDROURETERONEPHROSIS: Primary | ICD-10-CM

## 2021-12-28 DIAGNOSIS — R33.9 URINARY RETENTION: ICD-10-CM

## 2021-12-28 DIAGNOSIS — R73.9 HYPERGLYCEMIA: ICD-10-CM

## 2021-12-28 DIAGNOSIS — N32.0 BLADDER OUTLET OBSTRUCTION: ICD-10-CM

## 2021-12-28 LAB
ALBUMIN SERPL-MCNC: 2.6 G/DL (ref 3.5–5)
ALBUMIN/GLOB SERPL: 0.5 {RATIO} (ref 1.1–2.2)
ALP SERPL-CCNC: 233 U/L (ref 45–117)
ALT SERPL-CCNC: 27 U/L (ref 12–78)
ANION GAP SERPL CALC-SCNC: 11 MMOL/L (ref 5–15)
APPEARANCE UR: CLEAR
AST SERPL-CCNC: 14 U/L (ref 15–37)
B-OH-BUTYR SERPL-SCNC: 2.18 MMOL/L
BACTERIA URNS QL MICRO: NEGATIVE /HPF
BASE EXCESS BLD CALC-SCNC: 3.9 MMOL/L
BASOPHILS # BLD: 0.1 K/UL (ref 0–0.1)
BASOPHILS NFR BLD: 1 % (ref 0–1)
BILIRUB SERPL-MCNC: 0.3 MG/DL (ref 0.2–1)
BILIRUB UR QL: NEGATIVE
BUN SERPL-MCNC: 35 MG/DL (ref 6–20)
BUN/CREAT SERPL: 16 (ref 12–20)
CA-I BLD-MCNC: 1.15 MMOL/L (ref 1.12–1.32)
CALCIUM SERPL-MCNC: 10 MG/DL (ref 8.5–10.1)
CHLORIDE SERPL-SCNC: 92 MMOL/L (ref 97–108)
CO2 BLD-SCNC: 29 MMOL/L (ref 19–24)
CO2 SERPL-SCNC: 32 MMOL/L (ref 21–32)
COLOR UR: ABNORMAL
CREAT SERPL-MCNC: 2.19 MG/DL (ref 0.7–1.3)
CREAT UR-MCNC: 2.1 MG/DL (ref 0.6–1.3)
DIFFERENTIAL METHOD BLD: ABNORMAL
EOSINOPHIL # BLD: 0 K/UL (ref 0–0.4)
EOSINOPHIL NFR BLD: 0 % (ref 0–7)
EPITH CASTS URNS QL MICRO: ABNORMAL /LPF
ERYTHROCYTE [DISTWIDTH] IN BLOOD BY AUTOMATED COUNT: 14.5 % (ref 11.5–14.5)
GLOBULIN SER CALC-MCNC: 4.8 G/DL (ref 2–4)
GLUCOSE BLD STRIP.AUTO-MCNC: 254 MG/DL (ref 65–117)
GLUCOSE BLD STRIP.AUTO-MCNC: 362 MG/DL (ref 65–117)
GLUCOSE BLD STRIP.AUTO-MCNC: 379 MG/DL (ref 74–106)
GLUCOSE SERPL-MCNC: 327 MG/DL (ref 65–100)
GLUCOSE UR STRIP.AUTO-MCNC: >1000 MG/DL
HCO3 BLDA-SCNC: 28 MMOL/L
HCT VFR BLD AUTO: 44 % (ref 36.6–50.3)
HGB BLD-MCNC: 15.1 G/DL (ref 12.1–17)
HGB UR QL STRIP: ABNORMAL
HYALINE CASTS URNS QL MICRO: ABNORMAL /LPF (ref 0–5)
IMM GRANULOCYTES # BLD AUTO: 0 K/UL (ref 0–0.04)
IMM GRANULOCYTES NFR BLD AUTO: 0 % (ref 0–0.5)
KETONES UR QL STRIP.AUTO: ABNORMAL MG/DL
LACTATE BLD-SCNC: 2.45 MMOL/L (ref 0.4–2)
LEUKOCYTE ESTERASE UR QL STRIP.AUTO: NEGATIVE
LIPASE SERPL-CCNC: 21 U/L (ref 73–393)
LYMPHOCYTES # BLD: 1.4 K/UL (ref 0.8–3.5)
LYMPHOCYTES NFR BLD: 18 % (ref 12–49)
MCH RBC QN AUTO: 30.2 PG (ref 26–34)
MCHC RBC AUTO-ENTMCNC: 34.3 G/DL (ref 30–36.5)
MCV RBC AUTO: 88 FL (ref 80–99)
MONOCYTES # BLD: 0.4 K/UL (ref 0–1)
MONOCYTES NFR BLD: 5 % (ref 5–13)
NEUTS SEG # BLD: 5.6 K/UL (ref 1.8–8)
NEUTS SEG NFR BLD: 76 % (ref 32–75)
NITRITE UR QL STRIP.AUTO: NEGATIVE
NRBC # BLD: 0 K/UL (ref 0–0.01)
NRBC BLD-RTO: 0 PER 100 WBC
PCO2 BLDV: 41.4 MMHG (ref 41–51)
PH BLDV: 7.45 [PH] (ref 7.32–7.42)
PH UR STRIP: 6 [PH] (ref 5–8)
PLATELET # BLD AUTO: 481 K/UL (ref 150–400)
PMV BLD AUTO: 10.3 FL (ref 8.9–12.9)
PO2 BLDV: 31 MMHG (ref 25–40)
POTASSIUM BLD-SCNC: 4.3 MMOL/L (ref 3.5–5.5)
POTASSIUM SERPL-SCNC: 3.5 MMOL/L (ref 3.5–5.1)
PROT SERPL-MCNC: 7.4 G/DL (ref 6.4–8.2)
PROT UR STRIP-MCNC: >300 MG/DL
RBC # BLD AUTO: 5 M/UL (ref 4.1–5.7)
RBC #/AREA URNS HPF: ABNORMAL /HPF (ref 0–5)
RBC MORPH BLD: ABNORMAL
SERVICE CMNT-IMP: ABNORMAL
SODIUM BLD-SCNC: 137 MMOL/L (ref 136–145)
SODIUM SERPL-SCNC: 135 MMOL/L (ref 136–145)
SP GR UR REFRACTOMETRY: 1.03 (ref 1–1.03)
SPECIMEN SITE: ABNORMAL
UA: UC IF INDICATED,UAUC: ABNORMAL
UROBILINOGEN UR QL STRIP.AUTO: 0.2 EU/DL (ref 0.2–1)
WBC # BLD AUTO: 7.5 K/UL (ref 4.1–11.1)
WBC URNS QL MICRO: ABNORMAL /HPF (ref 0–4)

## 2021-12-28 PROCEDURE — 82803 BLOOD GASES ANY COMBINATION: CPT

## 2021-12-28 PROCEDURE — 82962 GLUCOSE BLOOD TEST: CPT

## 2021-12-28 PROCEDURE — 96375 TX/PRO/DX INJ NEW DRUG ADDON: CPT

## 2021-12-28 PROCEDURE — 36415 COLL VENOUS BLD VENIPUNCTURE: CPT

## 2021-12-28 PROCEDURE — 83690 ASSAY OF LIPASE: CPT

## 2021-12-28 PROCEDURE — 85025 COMPLETE CBC W/AUTO DIFF WBC: CPT

## 2021-12-28 PROCEDURE — 96374 THER/PROPH/DIAG INJ IV PUSH: CPT

## 2021-12-28 PROCEDURE — 74011250636 HC RX REV CODE- 250/636: Performed by: EMERGENCY MEDICINE

## 2021-12-28 PROCEDURE — 80053 COMPREHEN METABOLIC PANEL: CPT

## 2021-12-28 PROCEDURE — 74176 CT ABD & PELVIS W/O CONTRAST: CPT

## 2021-12-28 PROCEDURE — 82010 KETONE BODYS QUAN: CPT

## 2021-12-28 PROCEDURE — 96376 TX/PRO/DX INJ SAME DRUG ADON: CPT

## 2021-12-28 PROCEDURE — 99285 EMERGENCY DEPT VISIT HI MDM: CPT

## 2021-12-28 PROCEDURE — 81001 URINALYSIS AUTO W/SCOPE: CPT

## 2021-12-28 RX ORDER — ONDANSETRON 2 MG/ML
4 INJECTION INTRAMUSCULAR; INTRAVENOUS
Status: COMPLETED | OUTPATIENT
Start: 2021-12-28 | End: 2021-12-28

## 2021-12-28 RX ORDER — TAMSULOSIN HYDROCHLORIDE 0.4 MG/1
0.4 CAPSULE ORAL DAILY
Status: DISCONTINUED | OUTPATIENT
Start: 2021-12-29 | End: 2021-12-28 | Stop reason: HOSPADM

## 2021-12-28 RX ORDER — METOCLOPRAMIDE HYDROCHLORIDE 5 MG/ML
10 INJECTION INTRAMUSCULAR; INTRAVENOUS
Status: COMPLETED | OUTPATIENT
Start: 2021-12-28 | End: 2021-12-28

## 2021-12-28 RX ORDER — ONDANSETRON 4 MG/1
4 TABLET, ORALLY DISINTEGRATING ORAL
Qty: 20 TABLET | Refills: 0 | Status: ON HOLD | OUTPATIENT
Start: 2021-12-28 | End: 2022-07-12 | Stop reason: SDUPTHER

## 2021-12-28 RX ORDER — FINASTERIDE 5 MG/1
5 TABLET, FILM COATED ORAL DAILY
Status: DISCONTINUED | OUTPATIENT
Start: 2021-12-29 | End: 2021-12-28 | Stop reason: HOSPADM

## 2021-12-28 RX ORDER — TAMSULOSIN HYDROCHLORIDE 0.4 MG/1
0.4 CAPSULE ORAL DAILY
Qty: 15 CAPSULE | Refills: 0 | Status: SHIPPED | OUTPATIENT
Start: 2021-12-28 | End: 2022-01-12

## 2021-12-28 RX ORDER — MORPHINE SULFATE 2 MG/ML
4 INJECTION, SOLUTION INTRAMUSCULAR; INTRAVENOUS
Status: COMPLETED | OUTPATIENT
Start: 2021-12-28 | End: 2021-12-28

## 2021-12-28 RX ADMIN — METOCLOPRAMIDE 10 MG: 5 INJECTION, SOLUTION INTRAMUSCULAR; INTRAVENOUS at 16:29

## 2021-12-28 RX ADMIN — SODIUM CHLORIDE 1000 ML: 9 INJECTION, SOLUTION INTRAVENOUS at 11:49

## 2021-12-28 RX ADMIN — ONDANSETRON 4 MG: 2 INJECTION INTRAMUSCULAR; INTRAVENOUS at 11:49

## 2021-12-28 RX ADMIN — ONDANSETRON 4 MG: 2 INJECTION INTRAMUSCULAR; INTRAVENOUS at 16:29

## 2021-12-28 RX ADMIN — ONDANSETRON 4 MG: 2 INJECTION INTRAMUSCULAR; INTRAVENOUS at 14:36

## 2021-12-28 RX ADMIN — MORPHINE SULFATE 4 MG: 2 INJECTION, SOLUTION INTRAMUSCULAR; INTRAVENOUS at 14:37

## 2021-12-28 NOTE — ED NOTES
Pt started oral / po challenge and tolerated. Reveid by Dr. Lesly Capellan and discharge for home. Pt was seen by urology team and to follow up with urology at outpatient. Catheter leg bag applied to right leg and secured with strap. Pt educated on keeping leg bag below waist level and to keep bag clean. Pt verbalized understanding.

## 2021-12-28 NOTE — ED PROVIDER NOTES
EMERGENCY DEPARTMENT HISTORY AND PHYSICAL EXAM      Date: 12/28/2021  Patient Name: Ruby Avendano    History of Presenting Illness     Chief Complaint   Patient presents with    Vomiting       History Provided By: Patient    HPI: Ruby Avendano, 46 y.o. male with history of insulin dependence type 1 diabetes, history of DKA, bipolar disorder, seizure disorder presents to the ED with cc of nausea, vomiting, mild generalized abdominal pain, hyperglycemia. Blood glucose has been in the 200s over the past couple of days but today it was greater than 300 which is unusual for him. Endorses 4 days of persistent nausea and vomiting and has been able to keep down very little p.o. intake. Denies any fevers, chills, chest pain, shortness of breath, urinary symptoms, or change in bowel habits. He states this feels similar to DKA episodes he has had in the past.  He has been compliant with his insulin regimen including Lantus 30 units nightly as well as NovoLog sliding scale. He does endorse generalized abdominal pain aggravated by vomiting, alleviated with rest with diffuse generalized radiation. There are no other complaints, changes, or physical findings at this time. PCP: So Medina MD    No current facility-administered medications on file prior to encounter. Current Outpatient Medications on File Prior to Encounter   Medication Sig Dispense Refill    DULoxetine (CYMBALTA) 60 mg capsule Take 1 Capsule by mouth daily. 30 Capsule 3    cloNIDine HCL (CATAPRES) 0.1 mg tablet Take 1 Tablet by mouth two (2) times a day for 30 days. 60 Tablet 0    rosuvastatin (CRESTOR) 40 mg tablet Take 1 Tablet by mouth nightly. 30 Tablet 0    pantoprazole (Protonix) 40 mg tablet Take 1 Tablet by mouth daily. 30 Tablet 0    insulin aspart U-100 (NovoLOG Flexpen U-100 Insulin) 100 unit/mL (3 mL) inpn 8 Units by SubCUTAneous route Before breakfast, lunch, and dinner.  (Novolog or Humalog, whichever more preferred: Inject 5 units with each meal + correction insulin, up to 30 units per day 10 Adjustable Dose Pre-filled Pen Syringe 3    insulin glargine (Lantus Solostar U-100 Insulin) 100 unit/mL (3 mL) inpn INJECT 30 UNITS SUBCUTANEOUSLY DAILY 30 mL 4    ergocalciferol (ERGOCALCIFEROL) 1,250 mcg (50,000 unit) capsule Take 1 capsule by mouth once a week 12 Capsule 0    pregabalin (Lyrica) 75 mg capsule Take 75 mg by mouth two (2) times a day.  lisinopriL (PRINIVIL, ZESTRIL) 20 mg tablet Take 1 Tablet by mouth daily.  30 Tablet 3       Past History     Past Medical History:  Past Medical History:   Diagnosis Date    [facility] disorder[facility] depressed [facility]     Bipolar disorder ([facility] [facility]     Depression     Diabetes ([facility] [facility]     DKA, type 1 ([facility] [address] 2013    diagnosed age 23    H/O noncompliance with medical treatment, presenting hazards to health     MRSA (methicillin resistant staph aureus) culture positive     MRSA (methicillin resistant Staphylococcus aureus)     Face    Noncompliance with medication regimen     Second hand smoke exposure     Seizure ([address]     Seizures ([facility] [address] 2006 or 2007    one episode during [facility]ent[facility]       Past Surgical History:  Past Surgical History:   Procedure Laterality Date    HX HEENT      top left wisdom tooth    HX ORTHOPAEDIC Left     wrist; [facility]    UPPER GI ENDOSCOPY,BIOPSY  2018            Family History:  Family History   Problem Relation Age of Onset    Diabetes Mother     Diabetes Other         neice, type 1        Social History:  Social History     Tobacco Use    Smoking status: Former Smoker     Packs/day: 0.10     Years: 16.00     Pack years: 1.60     Types: Cigarettes     Start date: 10/4/1999     Quit date: 2020     Years since quittin.8    Smokeless tobacco: Never Used   Substance Use Topics    Alcohol use: No    Drug use: No       Allergies:  No Known Allergies      Review of Systems   Review of Systems   Constitutional: Positive for activity change, appetite change and fatigue. Negative for chills and fever. Respiratory: Negative for cough and shortness of breath. Cardiovascular: Negative for chest pain and leg swelling. Gastrointestinal: Positive for abdominal pain, nausea and vomiting. Negative for diarrhea. Genitourinary: Negative. Musculoskeletal: Negative for back pain and gait problem. Skin: Negative for color change and rash. Neurological: Negative for dizziness, weakness, light-headedness and headaches. All other systems reviewed and are negative. Physical Exam   Physical Exam  Vitals and nursing note reviewed. Constitutional:       General: He is in acute distress. Appearance: Normal appearance. He is ill-appearing. He is not toxic-appearing. Comments: Lying curled on his right side in fetal position with active vomiting   HENT:      Head: Normocephalic and atraumatic. Nose: Nose normal.      Mouth/Throat:      Mouth: Mucous membranes are moist.   Eyes:      Extraocular Movements: Extraocular movements intact. Pupils: Pupils are equal, round, and reactive to light. Cardiovascular:      Rate and Rhythm: Regular rhythm. Tachycardia present. Heart sounds: No murmur heard. Pulmonary:      Effort: Pulmonary effort is normal. No respiratory distress. Breath sounds: Normal breath sounds. No wheezing. Abdominal:      General: There is no distension. Palpations: Abdomen is soft. Tenderness: There is abdominal tenderness ( Diffuse, generalized). There is no guarding or rebound. Musculoskeletal:         General: No swelling or tenderness. Normal range of motion. Cervical back: Normal range of motion and neck supple. Right lower leg: No edema. Left lower leg: No edema. Skin:     General: Skin is warm and dry. Coloration: Skin is not pale. Findings: No erythema. Neurological:      General: No focal deficit present.       Mental Status: He is alert and oriented to person, place, and time. Diagnostic Study Results     Labs -     Recent Results (from the past 12 hour(s))   GLUCOSE, POC    Collection Time: 12/28/21 10:48 AM   Result Value Ref Range    Glucose (POC) 362 (H) 65 - 117 mg/dL    Performed by Moira Fonseca (GO RN)    BLOOD GAS,CHEM8,LACTIC ACID POC    Collection Time: 12/28/21 11:10 AM   Result Value Ref Range    Calcium, ionized (POC) 1.15 1.12 - 1.32 mmol/L    BICARBONATE 28 mmol/L    Base excess (POC) 3.9 mmol/L    Sample source VENOUS BLOOD      CO2, POC 29 (H) 19 - 24 MMOL/L    Sodium,  136 - 145 MMOL/L    Potassium, POC 4.3 3.5 - 5.5 MMOL/L    Glucose,  (H) 74 - 106 MG/DL    Creatinine, POC 2.1 (H) 0.6 - 1.3 MG/DL    Lactic Acid (POC) 2.45 (HH) 0.40 - 2.00 mmol/L    Critical value read back ANA     pH, venous (POC) 7.45 (H) 7.32 - 7.42      pCO2, venous (POC) 41.4 41 - 51 MMHG    pO2, venous (POC) 31 25 - 40 mmHg   CBC WITH AUTOMATED DIFF    Collection Time: 12/28/21 11:45 AM   Result Value Ref Range    WBC 7.5 4.1 - 11.1 K/uL    RBC 5.00 4. 10 - 5.70 M/uL    HGB 15.1 12.1 - 17.0 g/dL    HCT 44.0 36.6 - 50.3 %    MCV 88.0 80.0 - 99.0 FL    MCH 30.2 26.0 - 34.0 PG    MCHC 34.3 30.0 - 36.5 g/dL    RDW 14.5 11.5 - 14.5 %    PLATELET 019 (H) 590 - 400 K/uL    MPV 10.3 8.9 - 12.9 FL    NRBC 0.0 0  WBC    ABSOLUTE NRBC 0.00 0.00 - 0.01 K/uL    NEUTROPHILS 76 (H) 32 - 75 %    LYMPHOCYTES 18 12 - 49 %    MONOCYTES 5 5 - 13 %    EOSINOPHILS 0 0 - 7 %    BASOPHILS 1 0 - 1 %    IMMATURE GRANULOCYTES 0 0.0 - 0.5 %    ABS. NEUTROPHILS 5.6 1.8 - 8.0 K/UL    ABS. LYMPHOCYTES 1.4 0.8 - 3.5 K/UL    ABS. MONOCYTES 0.4 0.0 - 1.0 K/UL    ABS. EOSINOPHILS 0.0 0.0 - 0.4 K/UL    ABS. BASOPHILS 0.1 0.0 - 0.1 K/UL    ABS. IMM.  GRANS. 0.0 0.00 - 0.04 K/UL    DF SMEAR SCANNED      RBC COMMENTS NORMOCYTIC, NORMOCHROMIC     METABOLIC PANEL, COMPREHENSIVE    Collection Time: 12/28/21 11:45 AM   Result Value Ref Range    Sodium 135 (L) 136 - 145 mmol/L    Potassium 3.5 3.5 - 5.1 mmol/L    Chloride 92 (L) 97 - 108 mmol/L    CO2 32 21 - 32 mmol/L    Anion gap 11 5 - 15 mmol/L    Glucose 327 (H) 65 - 100 mg/dL    BUN 35 (H) 6 - 20 MG/DL    Creatinine 2.19 (H) 0.70 - 1.30 MG/DL    BUN/Creatinine ratio 16 12 - 20      GFR est AA 41 (L) >60 ml/min/1.73m2    GFR est non-AA 34 (L) >60 ml/min/1.73m2    Calcium 10.0 8.5 - 10.1 MG/DL    Bilirubin, total 0.3 0.2 - 1.0 MG/DL    ALT (SGPT) 27 12 - 78 U/L    AST (SGOT) 14 (L) 15 - 37 U/L    Alk. phosphatase 233 (H) 45 - 117 U/L    Protein, total 7.4 6.4 - 8.2 g/dL    Albumin 2.6 (L) 3.5 - 5.0 g/dL    Globulin 4.8 (H) 2.0 - 4.0 g/dL    A-G Ratio 0.5 (L) 1.1 - 2.2     LIPASE    Collection Time: 12/28/21 11:45 AM   Result Value Ref Range    Lipase 21 (L) 73 - 393 U/L   BETA-HYDROXYBUTYRATE    Collection Time: 12/28/21 11:45 AM   Result Value Ref Range    B-hydroxybutyrate 2.18 (H) <0.40 mmol/L   URINALYSIS W/ REFLEX CULTURE    Collection Time: 12/28/21 11:52 AM    Specimen: Urine   Result Value Ref Range    Color YELLOW/STRAW      Appearance CLEAR CLEAR      Specific gravity 1.028 1.003 - 1.030      pH (UA) 6.0 5.0 - 8.0      Protein >300 (A) NEG mg/dL    Glucose >1,000 (A) NEG mg/dL    Ketone TRACE (A) NEG mg/dL    Bilirubin Negative NEG      Blood MODERATE (A) NEG      Urobilinogen 0.2 0.2 - 1.0 EU/dL    Nitrites Negative NEG      Leukocyte Esterase Negative NEG      WBC 0-4 0 - 4 /hpf    RBC 10-20 0 - 5 /hpf    Epithelial cells FEW FEW /lpf    Bacteria Negative NEG /hpf    UA:UC IF INDICATED CULTURE NOT INDICATED BY UA RESULT CNI      Hyaline cast 2-5 0 - 5 /lpf   GLUCOSE, POC    Collection Time: 12/28/21  1:50 PM   Result Value Ref Range    Glucose (POC) 254 (H) 65 - 117 mg/dL    Performed by Gabriela Carter (GO RN)        Radiologic Studies -   CT ABD PELV WO CONT   Final Result      Severe bladder distention and severe right hydronephrosis, with prostatomegaly   highly concerning for bladder outlet obstruction. This important result was communicated to Dr. Jonathon Cabrera by Dr. Tiffany Ponce at 12:12pm on   12-28-21. CT Results  (Last 48 hours)               12/28/21 1131  CT ABD PELV WO CONT Final result    Impression:      Severe bladder distention and severe right hydronephrosis, with prostatomegaly   highly concerning for bladder outlet obstruction. This important result was communicated to Dr. Jonathon Cabrera by Dr. Tiffany Ponce at 12:12pm on   12-28-21. Narrative:  EXAM: CT ABD PELV WO CONT       INDICATION: Nausea, vomiting, diffuse abdominal pain       COMPARISON: CT abdomen pelvis December 2, 2021       CONTRAST:  None. TECHNIQUE:    Thin axial images were obtained through the abdomen and pelvis. Coronal and   sagittal reformats were generated. Oral contrast was not administered. CT dose   reduction was achieved through use of a standardized protocol tailored for this   examination and automatic exposure control for dose modulation. The absence of intravenous contrast material reduces the sensitivity for   evaluation of the vasculature and solid organs. FINDINGS:    LOWER THORAX: No significant abnormality in the incidentally imaged lower chest.   LIVER: No mass. BILIARY TREE: Gallbladder is within normal limits. CBD is not dilated. SPLEEN: within normal limits. PANCREAS: No focal abnormality. ADRENALS: Unremarkable. KIDNEYS/URETERS: Severe right-sided hydronephrosis and right hydroureter. STOMACH: Unremarkable. SMALL BOWEL: No dilatation or wall thickening. COLON: No dilatation or wall thickening. APPENDIX: Unremarkable. PERITONEUM: No ascites or pneumoperitoneum. RETROPERITONEUM: No lymphadenopathy or aortic aneurysm. REPRODUCTIVE ORGANS: Prostatomegaly. URINARY BLADDER: Severe distention of the bladder urine. BONES: No destructive bone lesion. ABDOMINAL WALL: No mass or hernia.    ADDITIONAL COMMENTS: N/A               CXR Results  (Last 48 hours)    None          Medical Decision Making   I am the first provider for this patient. I reviewed the vital signs, available nursing notes, past medical history, past surgical history, family history and social history. Vital Signs-Reviewed the patient's vital signs. Patient Vitals for the past 12 hrs:   Temp Pulse Resp BP SpO2   12/28/21 1654 98.2 °F (36.8 °C) (!) 104 17 (!) 164/96 96 %   12/28/21 1554 -- (!) 101 (!) 0 135/78 96 %   12/28/21 1425 -- (!) 104 29 (!) 192/95 94 %   12/28/21 1210 97.5 °F (36.4 °C) (!) 105 21 (!) 189/99 96 %   12/28/21 1050 98.2 °F (36.8 °C) (!) 110 20 (!) 163/100 99 %       Records Reviewed: Nursing Notes    Provider Notes (Medical Decision Making):   57-year-old male presenting with nausea, vomiting, diffuse generalized abdominal pain. Afebrile and vital signs are stable. Noted to be tachycardic. He does have history of DKA and concern is for recurrent DKA today with hyperglycemia and persistent nausea vomiting, and abdominal pain. He is diffusely tender and will obtain CT imaging as well as blood work and treat with fluids, insulin, and antiemetics. Patient's nausea and vomiting controlled with antiemetics in the ED. Vital signs stabilized although he still has some tachycardia. No sign of DKA on laboratory evaluation today and patient was treated with IV fluids for his hyperglycemia. CT demonstrating right-sided hydronephrosis due to bladder outlet obstruction likely secondary to prostatomegaly. He is retaining significant amount of urine and Beard catheter placed by ED RN with significant amount of urine that was drained. Urology saw patient in the ED and recommends tamsulosin and evaluate with. No further work-up needed for FELECIA which is postobstructive and should resolve with placement of Beard catheter and alleviation of obstruction. Patient able to tolerate some p.o. in the ED. Encourage follow-up with urology and PCP and patient was given strict return ED precautions.  All questions answered and he agrees with plan as above. ED Course:   Initial assessment performed. The patients presenting problems have been discussed, and they are in agreement with the care plan formulated and outlined with them. I have encouraged them to ask questions as they arise throughout their visit. Discharge Note:  The patient has been re-evaluated and is ready for discharge. Reviewed available results with patient. Counseled patient on diagnosis and care plan. Patient has expressed understanding, and all questions have been answered. Patient agrees with plan and agrees to follow up as recommended, or to return to the ED if their symptoms worsen. Discharge instructions have been provided and explained to the patient, along with reasons to return to the ED. Disposition:  Admit      DISCHARGE PLAN:  1. Discharge Medication List as of 12/28/2021  5:05 PM      START taking these medications    Details   tamsulosin (Flomax) 0.4 mg capsule Take 1 Capsule by mouth daily for 15 days. , Normal, Disp-15 Capsule, R-0      ondansetron (ZOFRAN ODT) 4 mg disintegrating tablet Take 1 Tablet by mouth every eight (8) hours as needed for Nausea or Vomiting., Normal, Disp-20 Tablet, R-0         CONTINUE these medications which have NOT CHANGED    Details   DULoxetine (CYMBALTA) 60 mg capsule Take 1 Capsule by mouth daily. , Normal, Disp-30 Capsule, R-3      cloNIDine HCL (CATAPRES) 0.1 mg tablet Take 1 Tablet by mouth two (2) times a day for 30 days. , Normal, Disp-60 Tablet, R-0      rosuvastatin (CRESTOR) 40 mg tablet Take 1 Tablet by mouth nightly., Normal, Disp-30 Tablet, R-0      pantoprazole (Protonix) 40 mg tablet Take 1 Tablet by mouth daily. , Normal, Disp-30 Tablet, R-0      insulin aspart U-100 (NovoLOG Flexpen U-100 Insulin) 100 unit/mL (3 mL) inpn 8 Units by SubCUTAneous route Before breakfast, lunch, and dinner.  (Novolog or Humalog, whichever more preferred: Inject 5 units with each meal + correction insulin, up to 30 units per day, Normal, Disp-10 Adjustable Dose Pre-filled Pen Syringe, R-3      insulin glargine (Lantus Solostar U-100 Insulin) 100 unit/mL (3 mL) inpn INJECT 30 UNITS SUBCUTANEOUSLY DAILY, Normal, Disp-30 mL, R-4      ergocalciferol (ERGOCALCIFEROL) 1,250 mcg (50,000 unit) capsule Take 1 capsule by mouth once a week, Normal, Disp-12 Capsule, R-0      pregabalin (Lyrica) 75 mg capsule Take 75 mg by mouth two (2) times a day., Historical Med      lisinopriL (PRINIVIL, ZESTRIL) 20 mg tablet Take 1 Tablet by mouth daily. , Normal, Disp-30 Tablet, R-3           2. Follow-up Information     Follow up With Specialties Details Why Contact Info    [facility]  Schedule an appointment as soon as possible for a visit in 10 days  [address] [place] [address]  [phone]    [facility] EMERGENCY DEPT Emergency Medicine Go to As needed, If symptoms worsen [address] [place] [address] Drive  [address] [facility]  [phone]        3. Return to ED if worse     Diagnosis     Clinical Impression:   1. Hydroureteronephrosis    2. FELECIA (acute kidney injury) ([facility] [address]    3. Bladder outlet obstruction    4. Urinary retention    5. Non-intractable vomiting with nausea, unspecified vomiting type    6. Hyperglycemia        Attestations:  I am the first and primary provider of record for this patient's ED encounter. I personally performed the services described above in this documentation. Regine Mehta MD    Please note that this dictation was completed with [employer], the [employer] voice recognition software. Quite often unanticipated grammatical, syntax, homophones, and other interpretive errors are inadvertently transcribed by the computer software. Please disregard these errors. Please excuse any errors that have escaped final proofreading. Thank you.

## 2021-12-28 NOTE — DISCHARGE INSTRUCTIONS
You were evaluated in the emergency department for nausea, vomiting, and abdominal pain. You were found to have an enlarged prostate resulting in urinary retention and a kidney injury. Your examination was reassuring as was your work-up including blood work and CT scan. It will be important for you to follow-up with Urology in 3-7 days. If you develop worsening symptoms such as fevers, nausea, vomiting, catheter problems, or abdominal pain, please return to the emergency department immediately. It was a pleasure taking care of you at [facility] Emergency Department today. We know that when you come to [facility], you are entrusting us with your health, comfort, and safety. Our physicians and nurses honor that trust, and we truly appreciate the opportunity to care for you and your loved ones. We also value your feedback. If you receive a survey about your Emergency Department experience today, please fill it out. We care about our patients' feedback, and we listen to what you have to say. Thank you!

## 2021-12-28 NOTE — ED TRIAGE NOTES
Pt in via EMS for elevated blood sugar and vomiting since [place] night. Pt took 10 units of Humalog this morning. Pt reports of abdominal pain and pain in both legs.

## 2021-12-28 NOTE — CONSULTS
Urology Consult    Patient: Robert Granados MRN: [id number]  SSN: [id number]    YOB: 1980  Age: 46 y.o. Sex: male          Date of Consultation:  December 28, 2021  Requesting Physician: Frances Garcia MD  Reason for Consultation:  Urinary retention          History of Present Illness:  Robert Granados is a 46 y.o. male admitted 12/28/2021 to the hospital for No admission diagnoses are documented for this encounter. Olga Gomezgo He  is a 46 y.o. male with history of insulin dependence type 1 diabetes, history of DKA, bipolar disorder, seizure disorder presents to the ED with cc of nausea, vomiting, mild generalized abdominal pain, hyperglycemia. Blood glucose has been in the 200s over the past couple of days but today it was greater than 300 which is unusual for him. Endorses 4 days of persistent nausea and vomiting and has been able to keep down very little p.o. intake. Denies any fevers, chills, chest pain, shortness of breath, urinary symptoms, or change in bowel habits. During ER evaluation  Pt was noted to have retention of > 999 ml , CT showed R hydronephrosis , urology has been consulted . Cr 2.19 ( 1.37 12/13)   CT showed severe bladder distention with R hydronephrosis with prostatomegaly       Pt seen in ER , still experiencing N/V , hansen in place draining clear yellow urine . Pt not established with [facility] or any urologist . Denies any PSA labs or difficulty voiding . He states has been diabetic for 18 years . Explained to pt enlarged prostate may be impacting R kidney function , need for hansen placement for decompression. Explained to pt will need OP follow and management of enlarged prostate as well as need for management .  Will start pt on Tamsulosin and finasteride , arrange OP follow up       Past Medical History:   Diagnosis Date    Bipolar 1 disorder, depressed ([address]     [facility]     Depression     Diabetes ([facility]     DKA, type 1 ([facility] [address] [facility] 1/27/2013 diagnosed age 23    H/O noncompliance with medical treatment, presenting hazards to health     MRSA (methicillin resistant staph aureus) culture positive     MRSA (methicillin resistant Staphylococcus aureus)     Face    Noncompliance with medication regimen     Second hand smoke exposure     Seiz[facility]     Seizures ([facility] [address] 2006 or 2007    one episode during residential        Past Surgical History:   Procedure Laterality Date    HX HEENT      top left wisdom tooth    HX ORTHOPAEDIC Left     wrist; MCV    UPPER GI ENDOSCOPY,BIOPSY  11/20/2018            No Known Allergies     Prior to Admission medications    Medication Sig Start Date End Date Taking? Authorizing Provider   DULoxetine (CYMBALTA) 60 mg capsule Take 1 Capsule by mouth daily. 12/13/21   Carlos Johnson MD   cloNIDine HCL (CATAPRES) 0.1 mg tablet Take 1 Tablet by mouth two (2) times a day for 30 days. 12/6/21 1/5/22  Beti Calero MD   rosuvastatin (CRESTOR) 40 mg tablet Take 1 Tablet by mouth nightly. 12/6/21   Beti Calero MD   pantoprazole (Protonix) 40 mg tablet Take 1 Tablet by mouth daily. 12/6/21   Beti Calero MD   insulin aspart U-100 (NovoLOG Flexpen U-100 Insulin) 100 unit/mL (3 mL) inpn 8 Units by SubCUTAneous route Before breakfast, lunch, and dinner. (Novolog or Humalog, whichever more preferred: Inject 5 units with each meal + correction insulin, up to 30 units per day 12/6/21   Beti Calero MD   insulin glargine (Lantus Solostar U-100 Insulin) 100 unit/mL (3 mL) inpn INJECT 30 UNITS SUBCUTANEOUSLY DAILY 11/15/21   Carlos Johnson MD   ergocalciferol (ERGOCALCIFEROL) 1,250 mcg (50,000 unit) capsule Take 1 capsule by mouth once a week 10/30/21   Carlos Johnson MD   pregabalin (Lyrica) 75 mg capsule Take 75 mg by mouth two (2) times a day. Provider, Historical   lisinopriL (PRINIVIL, ZESTRIL) 20 mg tablet Take 1 Tablet by mouth daily.  7/14/21   Carlos Johnson MD       Social History     Tobacco Use    Smoking status: Former Smoker     Packs/day: 0.10     Years: 16.00     Pack years: 1.60     Types: Cigarettes     Start date: 10/4/1999     Quit date: 2020     Years since quittin.8    Smokeless tobacco: Never Used   Substance Use Topics    Alcohol use: No        Family History   Problem Relation Age of Onset    Diabetes Mother     Diabetes Other         neice, type 1         ROS:  Admission ROS by No admitting provider for patient encounter. from 2021 was reviewed and changes (other than per HPI) include: none. Physical Exam:    Vital Signs:  Temp (24hrs), Av.9 °F (36.6 °C), Min:97.5 °F (36.4 °C), Max:98.2 °F (36.8 °C)   Blood pressure (!) 192/95, pulse (!) 104, temperature 97.5 °F (36.4 °C), resp. rate 29, height 5' 8\" (1.727 m), weight 70.8 kg (156 lb), SpO2 94 %. I&O's:   07 -  1900  In: 1000 [I.V.:1000]  Out: 1800 [Urine:1800]    Appearance: well-developed NAD , lethargic , weak   Conjunctiva/Lids: conjunctivae and lids normal   Pupil/Iris: pupils equal, round   External Ears/Nose: normal no lesions or deformities   Hearing: grossly intact   Neck: supple   Thyroid: no palpable enlargement   Respiratory Effort: breathing easily   Abd.  Aorta: no palpable enlargement   Lower Extremity: no edema   Abdomen/Flank: soft non-tender without masses; no CVA tenderness , nausea / vomiting   Liver/Spleen: no organomegaly   Hernia: no ventral hernia   Lymph: no adenopathy   Gait/Station: not tested   Digits/Nails: no clubbing cyanosis petechiae   Skin Inspection: warm and dry   Skin Palpation: no SQ nodularity   Sensation: no sensory deficits   Judgment/Insight: intact   Mood/Affect: normal      Lab Review:     CBC   Recent Labs     21  1145   WBC 7.5   HGB 15.1   HCT 44.0   *     CMP   Recent Labs     21  1145   *   K 3.5   CL 92*   CO2 32   *   BUN 35*   CREA 2.19*   CA 10.0   ALB 2.6*   TBILI 0.3   ALT 27       Other No results for input(s): INR, PSA, INREXT in the last 72 hours. No lab exists for component: PTT    UA:   Lab Results   Component Value Date/Time    Color YELLOW/STRAW 12/28/2021 11:52 AM    Appearance CLEAR 12/28/2021 11:52 AM    Specific gravity 1.028 12/28/2021 11:52 AM    Specific gravity 1.015 04/01/2021 10:00 AM    pH (UA) 6.0 12/28/2021 11:52 AM    Protein >300 (A) 12/28/2021 11:52 AM    Glucose >1,000 (A) 12/28/2021 11:52 AM    Ketone TRACE (A) 12/28/2021 11:52 AM    Bilirubin Negative 12/28/2021 11:52 AM    Urobilinogen 0.2 12/28/2021 11:52 AM    Nitrites Negative 12/28/2021 11:52 AM    Leukocyte Esterase Negative 12/28/2021 11:52 AM    Epithelial cells FEW 12/28/2021 11:52 AM    Bacteria Negative 12/28/2021 11:52 AM    WBC 0-4 12/28/2021 11:52 AM    RBC 10-20 12/28/2021 11:52 AM       Cultures:  NA    Imaging:      CT: Results for orders placed during the hospital encounter of 12/28/21    CT ABD PELV WO CONT    Narrative  EXAM: CT ABD PELV WO CONT    INDICATION: Nausea, vomiting, diffuse abdominal pain    COMPARISON: CT abdomen pelvis December 2, 2021    CONTRAST:  None. TECHNIQUE:  Thin axial images were obtained through the abdomen and pelvis. Coronal and  sagittal reformats were generated. Oral contrast was not administered. CT dose  reduction was achieved through use of a standardized protocol tailored for this  examination and automatic exposure control for dose modulation. The absence of intravenous contrast material reduces the sensitivity for  evaluation of the vasculature and solid organs. FINDINGS:  LOWER THORAX: No significant abnormality in the incidentally imaged lower chest.  LIVER: No mass. BILIARY TREE: Gallbladder is within normal limits. CBD is not dilated. SPLEEN: within normal limits. PANCREAS: No focal abnormality. ADRENALS: Unremarkable. KIDNEYS/URETERS: Severe right-sided hydronephrosis and right hydroureter. STOMACH: Unremarkable. SMALL BOWEL: No dilatation or wall thickening.   COLON: No dilatation or wall thickening. APPENDIX: Unremarkable. PERITONEUM: No ascites or pneumoperitoneum. RETROPERITONEUM: No lymphadenopathy or aortic aneurysm. REPRODUCTIVE ORGANS: Prostatomegaly. URINARY BLADDER: Severe distention of the bladder urine. BONES: No destructive bone lesion. ABDOMINAL WALL: No mass or hernia. ADDITIONAL COMMENTS: N/A    Impression  Severe bladder distention and severe right hydronephrosis, with prostatomegaly  highly concerning for bladder outlet obstruction. This important result was communicated to Dr. Cheryl Hooper by Dr. Bethel Castleman at 12:12pm on  12-28-21. Assessment:     Active Problems:    * No active hospital problems. *        Plan:     1. Acute urinary retention -place hansen  Will need for 7-10 days  Start Tamsulosin , Finasteride once pt can tolerate PO . Will arrange OP follow up and VT . Will discuss follow up with pt at [facility] since he would be out of network with [facility]     Supervising MD : Dr. Carole Montenegro By: Chiquis Javier.  Too Pardo NP  - December 28, 2021

## 2022-01-01 ENCOUNTER — HOSPITAL ENCOUNTER (EMERGENCY)
Age: 42
Discharge: HOME | End: 2022-01-01
Attending: EMERGENCY MEDICINE
Payer: COMMERCIAL

## 2022-01-01 VITALS
DIASTOLIC BLOOD PRESSURE: 93 MMHG | SYSTOLIC BLOOD PRESSURE: 161 MMHG | BODY MASS INDEX: 21.16 KG/M2 | RESPIRATION RATE: 16 BRPM | HEIGHT: 69 IN | HEART RATE: 91 BPM | OXYGEN SATURATION: 98 % | WEIGHT: 142.86 LBS | TEMPERATURE: 98.2 F

## 2022-01-01 DIAGNOSIS — N30.91 HEMORRHAGIC CYSTITIS: Primary | ICD-10-CM

## 2022-01-01 DIAGNOSIS — T83.9XXA PROBLEM WITH URINARY CATHETER (HCC): ICD-10-CM

## 2022-01-01 LAB
ANION GAP SERPL CALC-SCNC: 6 MMOL/L (ref 5–15)
APPEARANCE UR: ABNORMAL
BACTERIA URNS QL MICRO: ABNORMAL /HPF
BASOPHILS # BLD: 0 K/UL (ref 0–0.1)
BASOPHILS NFR BLD: 0 % (ref 0–1)
BILIRUB UR QL: NEGATIVE
BUN SERPL-MCNC: 18 MG/DL (ref 6–20)
BUN/CREAT SERPL: 12 (ref 12–20)
CALCIUM SERPL-MCNC: 8.7 MG/DL (ref 8.5–10.1)
CHLORIDE SERPL-SCNC: 95 MMOL/L (ref 97–108)
CO2 SERPL-SCNC: 30 MMOL/L (ref 21–32)
COLOR UR: ABNORMAL
CREAT SERPL-MCNC: 1.53 MG/DL (ref 0.7–1.3)
DIFFERENTIAL METHOD BLD: ABNORMAL
EOSINOPHIL # BLD: 0.2 K/UL (ref 0–0.4)
EOSINOPHIL NFR BLD: 2 % (ref 0–7)
EPITH CASTS URNS QL MICRO: ABNORMAL /LPF
ERYTHROCYTE [DISTWIDTH] IN BLOOD BY AUTOMATED COUNT: 13.9 % (ref 11.5–14.5)
GLUCOSE BLD STRIP.AUTO-MCNC: 291 MG/DL (ref 65–117)
GLUCOSE SERPL-MCNC: 297 MG/DL (ref 65–100)
GLUCOSE UR STRIP.AUTO-MCNC: >1000 MG/DL
HCT VFR BLD AUTO: 36.6 % (ref 36.6–50.3)
HGB BLD-MCNC: 12.9 G/DL (ref 12.1–17)
HGB UR QL STRIP: ABNORMAL
HYALINE CASTS URNS QL MICRO: ABNORMAL /LPF (ref 0–5)
IMM GRANULOCYTES # BLD AUTO: 0 K/UL (ref 0–0.04)
IMM GRANULOCYTES NFR BLD AUTO: 0 % (ref 0–0.5)
KETONES UR QL STRIP.AUTO: 15 MG/DL
LEUKOCYTE ESTERASE UR QL STRIP.AUTO: ABNORMAL
LYMPHOCYTES # BLD: 0.9 K/UL (ref 0.8–3.5)
LYMPHOCYTES NFR BLD: 9 % (ref 12–49)
MCH RBC QN AUTO: 30.2 PG (ref 26–34)
MCHC RBC AUTO-ENTMCNC: 35.2 G/DL (ref 30–36.5)
MCV RBC AUTO: 85.7 FL (ref 80–99)
MONOCYTES # BLD: 0.7 K/UL (ref 0–1)
MONOCYTES NFR BLD: 7 % (ref 5–13)
NEUTS SEG # BLD: 8 K/UL (ref 1.8–8)
NEUTS SEG NFR BLD: 82 % (ref 32–75)
NITRITE UR QL STRIP.AUTO: POSITIVE
NRBC # BLD: 0 K/UL (ref 0–0.01)
NRBC BLD-RTO: 0 PER 100 WBC
PH UR STRIP: 5 [PH] (ref 5–8)
PLATELET # BLD AUTO: 381 K/UL (ref 150–400)
PMV BLD AUTO: 10.4 FL (ref 8.9–12.9)
POTASSIUM SERPL-SCNC: 3.9 MMOL/L (ref 3.5–5.1)
PROT UR STRIP-MCNC: >300 MG/DL
RBC # BLD AUTO: 4.27 M/UL (ref 4.1–5.7)
RBC #/AREA URNS HPF: >100 /HPF (ref 0–5)
SERVICE CMNT-IMP: ABNORMAL
SODIUM SERPL-SCNC: 131 MMOL/L (ref 136–145)
SP GR UR REFRACTOMETRY: 1.01 (ref 1–1.03)
UA: UC IF INDICATED,UAUC: ABNORMAL
UROBILINOGEN UR QL STRIP.AUTO: 1 EU/DL (ref 0.2–1)
WBC # BLD AUTO: 9.8 K/UL (ref 4.1–11.1)
WBC URNS QL MICRO: ABNORMAL /HPF (ref 0–4)

## 2022-01-01 PROCEDURE — 99283 EMERGENCY DEPT VISIT LOW MDM: CPT

## 2022-01-01 PROCEDURE — 96365 THER/PROPH/DIAG IV INF INIT: CPT

## 2022-01-01 PROCEDURE — 96375 TX/PRO/DX INJ NEW DRUG ADDON: CPT

## 2022-01-01 PROCEDURE — 82962 GLUCOSE BLOOD TEST: CPT

## 2022-01-01 PROCEDURE — 87086 URINE CULTURE/COLONY COUNT: CPT

## 2022-01-01 PROCEDURE — 81001 URINALYSIS AUTO W/SCOPE: CPT

## 2022-01-01 PROCEDURE — 85025 COMPLETE CBC W/AUTO DIFF WBC: CPT

## 2022-01-01 PROCEDURE — 36415 COLL VENOUS BLD VENIPUNCTURE: CPT

## 2022-01-01 PROCEDURE — 51798 US URINE CAPACITY MEASURE: CPT

## 2022-01-01 PROCEDURE — 74011250637 HC RX REV CODE- 250/637: Performed by: EMERGENCY MEDICINE

## 2022-01-01 PROCEDURE — 74011250636 HC RX REV CODE- 250/636: Performed by: EMERGENCY MEDICINE

## 2022-01-01 PROCEDURE — 74011000258 HC RX REV CODE- 258: Performed by: EMERGENCY MEDICINE

## 2022-01-01 PROCEDURE — 87077 CULTURE AEROBIC IDENTIFY: CPT

## 2022-01-01 PROCEDURE — 80048 BASIC METABOLIC PNL TOTAL CA: CPT

## 2022-01-01 PROCEDURE — 87186 SC STD MICRODIL/AGAR DIL: CPT

## 2022-01-01 RX ORDER — OXYCODONE HYDROCHLORIDE 5 MG/1
5 TABLET ORAL
Status: COMPLETED | OUTPATIENT
Start: 2022-01-01 | End: 2022-01-01

## 2022-01-01 RX ORDER — CEFDINIR 300 MG/1
300 CAPSULE ORAL 2 TIMES DAILY
Qty: 14 CAPSULE | Refills: 0 | Status: SHIPPED | OUTPATIENT
Start: 2022-01-01 | End: 2022-01-08

## 2022-01-01 RX ORDER — MORPHINE SULFATE 2 MG/ML
4 INJECTION, SOLUTION INTRAMUSCULAR; INTRAVENOUS
Status: DISCONTINUED | OUTPATIENT
Start: 2022-01-01 | End: 2022-01-01 | Stop reason: HOSPADM

## 2022-01-01 RX ORDER — KETOROLAC TROMETHAMINE 30 MG/ML
15 INJECTION, SOLUTION INTRAMUSCULAR; INTRAVENOUS
Status: COMPLETED | OUTPATIENT
Start: 2022-01-01 | End: 2022-01-01

## 2022-01-01 RX ADMIN — OXYCODONE 5 MG: 5 TABLET ORAL at 14:20

## 2022-01-01 RX ADMIN — CEFTRIAXONE SODIUM 2 G: 2 INJECTION, POWDER, FOR SOLUTION INTRAMUSCULAR; INTRAVENOUS at 12:40

## 2022-01-01 RX ADMIN — KETOROLAC TROMETHAMINE 15 MG: 30 INJECTION, SOLUTION INTRAMUSCULAR; INTRAVENOUS at 11:22

## 2022-01-01 NOTE — ED PROVIDER NOTES
EMERGENCY DEPARTMENT HISTORY AND PHYSICAL EXAM      Date: 1/1/2022  Patient Name: Demond Zhou    History of Presenting Illness     Chief Complaint   Patient presents with    Urinary Catheter Problem     urinary catheter filling with blood since 2am; he had it placed 4 days ago. History Provided By: Patient    HPI: Demond Zhou, 46 y.o. male with history of diabetes type 1, DKA, bipolar disorder, seizure disorder presents to the ED with cc of hematuria. Symptoms onset 3 AM this morning, also noted to have right lower quadrant and lower abdominal discomfort. Symptoms have been consistent since onset. No blood clots noted. He had Beard catheter placed 12/28 after he presented with nausea and vomiting and CT imaging found right hydroureteronephrosis secondary to enlarged prostate. There are no other complaints, changes, or physical findings at this time. PCP: Nan Parsons MD    No current facility-administered medications on file prior to encounter. Current Outpatient Medications on File Prior to Encounter   Medication Sig Dispense Refill    tamsulosin (Flomax) 0.4 mg capsule Take 1 Capsule by mouth daily for 15 days. 15 Capsule 0    ondansetron (ZOFRAN ODT) 4 mg disintegrating tablet Take 1 Tablet by mouth every eight (8) hours as needed for Nausea or Vomiting. 20 Tablet 0    DULoxetine (CYMBALTA) 60 mg capsule Take 1 Capsule by mouth daily. 30 Capsule 3    cloNIDine HCL (CATAPRES) 0.1 mg tablet Take 1 Tablet by mouth two (2) times a day for 30 days. 60 Tablet 0    rosuvastatin (CRESTOR) 40 mg tablet Take 1 Tablet by mouth nightly. 30 Tablet 0    pantoprazole (Protonix) 40 mg tablet Take 1 Tablet by mouth daily. 30 Tablet 0    insulin aspart U-100 (NovoLOG Flexpen U-100 Insulin) 100 unit/mL (3 mL) inpn 8 Units by SubCUTAneous route Before breakfast, lunch, and dinner.  (Novolog or Humalog, whichever more preferred: Inject 5 units with each meal + correction insulin, up to 30 units per day 10 Adjustable Dose Pre-filled Pen Syringe 3    insulin glargine (Lantus Solostar U-100 Insulin) 100 unit/mL (3 mL) inpn INJECT 30 UNITS SUBCUTANEOUSLY DAILY 30 mL 4    ergocalciferol (ERGOCALCIFEROL) 1,250 mcg (50,000 unit) capsule Take 1 capsule by mouth once a week 12 Capsule 0    pregabalin (Lyrica) 75 mg capsule Take 75 mg by mouth two (2) times a day.  lisinopriL (PRINIVIL, ZESTRIL) 20 mg tablet Take 1 Tablet by mouth daily. 30 Tablet 3       Past History     Past Medical History:  Past Medical History:   Diagnosis Date    B[facility] disorder[facility]     Bipolar disorder ([facility] [facility]     Depression     Diabetes ([facility]     DKA, type 1 ([facility] [facility] 2013    diagnosed age 23    H/O noncompliance with medical treatment, presenting hazards to health     MRSA (methicillin resistant staph aureus) culture positive     MRSA (methicillin resistant Staphylococcus aureus)     Face    Noncompliance with medication regimen     Second hand smoke exposure     Seizure ([facility] [facility]     Seizures ([facility] [address] [facility]  or     one episode during [facility]       Past Surgical History:  Past Surgical History:   Procedure Laterality Date    HX HEENT      top left wisdom tooth    HX ORTHOPAEDIC Left     wrist; [facility]    UPPER GI ENDOSCOPY,BIOPSY  2018            Family History:  Family History   Problem Relation Age of Onset    Diabetes Mother     Diabetes Other         neice, type 1        Social History:  Social History     Tobacco Use    Smoking status: Former Smoker     Packs/day: 0.10     Years: 16.00     Pack years: 1.60     Types: Cigarettes     Start date: 10/4/1999     Quit date: 2020     Years since quittin.8    Smokeless tobacco: Never Used   Substance Use Topics    Alcohol use: No    Drug use: No       Allergies:  No Known Allergies      Review of Systems   Review of Systems   Constitutional: Negative for chills and fever. Respiratory: Negative for cough and shortness of breath. Cardiovascular: Negative for chest pain and leg swelling. Gastrointestinal: Positive for abdominal pain. Negative for diarrhea, nausea and vomiting. Genitourinary: Positive for hematuria. Musculoskeletal: Negative for back pain and gait problem. Skin: Negative for color change and rash. Neurological: Negative for dizziness, weakness, light-headedness and headaches. All other systems reviewed and are negative. Physical Exam   Physical Exam  Vitals and nursing note reviewed. Constitutional:       General: He is not in acute distress. Appearance: Normal appearance. He is not ill-appearing, toxic-appearing or diaphoretic. HENT:      Head: Normocephalic and atraumatic. Cardiovascular:      Rate and Rhythm: Normal rate and regular rhythm. Heart sounds: Normal heart sounds. No murmur heard. Pulmonary:      Effort: Pulmonary effort is normal. No respiratory distress. Breath sounds: Normal breath sounds. No wheezing. Abdominal:      Palpations: Abdomen is soft. Tenderness: There is abdominal tenderness ( Mild suprapubic TTP without guarding or rebound, no distention). There is no guarding or rebound. Skin:     General: Skin is warm and dry. Findings: No erythema or rash. Neurological:      General: No focal deficit present. Mental Status: He is alert and oriented to person, place, and time.          Diagnostic Study Results     Labs -     Recent Results (from the past 12 hour(s))   GLUCOSE, POC    Collection Time: 01/01/22 11:16 AM   Result Value Ref Range    Glucose (POC) 291 (H) 65 - 117 mg/dL    Performed by Fabrizio Varghese    Collection Time: 01/01/22 11:19 AM    Specimen: Urine   Result Value Ref Range    Color RED      Appearance TURBID (A) CLEAR      Specific gravity 1.015 1.003 - 1.030      pH (UA) 5.0 5.0 - 8.0      Protein >300 (A) NEG mg/dL    Glucose >1,000 (A) NEG mg/dL    Ketone 15 (A) NEG mg/dL    Bilirubin Negative NEG Blood LARGE (A) NEG      Urobilinogen 1.0 0.2 - 1.0 EU/dL    Nitrites Positive (A) NEG      Leukocyte Esterase TRACE (A) NEG      WBC 10-20 0 - 4 /hpf    RBC >100 (H) 0 - 5 /hpf    Epithelial cells FEW FEW /lpf    Bacteria 2+ (A) NEG /hpf    UA:UC IF INDICATED URINE CULTURE ORDERED (A) CNI      Hyaline cast 0-2 0 - 5 /lpf   CBC WITH AUTOMATED DIFF    Collection Time: 01/01/22 11:19 AM   Result Value Ref Range    WBC 9.8 4.1 - 11.1 K/uL    RBC 4.27 4. 10 - 5.70 M/uL    HGB 12.9 12.1 - 17.0 g/dL    HCT 36.6 36.6 - 50.3 %    MCV 85.7 80.0 - 99.0 FL    MCH 30.2 26.0 - 34.0 PG    MCHC 35.2 30.0 - 36.5 g/dL    RDW 13.9 11.5 - 14.5 %    PLATELET 714 650 - 799 K/uL    MPV 10.4 8.9 - 12.9 FL    NRBC 0.0 0  WBC    ABSOLUTE NRBC 0.00 0.00 - 0.01 K/uL    NEUTROPHILS 82 (H) 32 - 75 %    LYMPHOCYTES 9 (L) 12 - 49 %    MONOCYTES 7 5 - 13 %    EOSINOPHILS 2 0 - 7 %    BASOPHILS 0 0 - 1 %    IMMATURE GRANULOCYTES 0 0.0 - 0.5 %    ABS. NEUTROPHILS 8.0 1.8 - 8.0 K/UL    ABS. LYMPHOCYTES 0.9 0.8 - 3.5 K/UL    ABS. MONOCYTES 0.7 0.0 - 1.0 K/UL    ABS. EOSINOPHILS 0.2 0.0 - 0.4 K/UL    ABS. BASOPHILS 0.0 0.0 - 0.1 K/UL    ABS. IMM. GRANS. 0.0 0.00 - 0.04 K/UL    DF AUTOMATED     METABOLIC PANEL, BASIC    Collection Time: 01/01/22 12:00 PM   Result Value Ref Range    Sodium 131 (L) 136 - 145 mmol/L    Potassium 3.9 3.5 - 5.1 mmol/L    Chloride 95 (L) 97 - 108 mmol/L    CO2 30 21 - 32 mmol/L    Anion gap 6 5 - 15 mmol/L    Glucose 297 (H) 65 - 100 mg/dL    BUN 18 6 - 20 MG/DL    Creatinine 1.53 (H) 0.70 - 1.30 MG/DL    BUN/Creatinine ratio 12 12 - 20      GFR est AA >60 >60 ml/min/1.73m2    GFR est non-AA 51 (L) >60 ml/min/1.73m2    Calcium 8.7 8.5 - 10.1 MG/DL       Radiologic Studies -   No orders to display     CT Results  (Last 48 hours)    None        CXR Results  (Last 48 hours)    None          Medical Decision Making   I am the first provider for this patient.     I reviewed the vital signs, available nursing notes, past medical history, past surgical history, family history and social history. Vital Signs-Reviewed the patient's vital signs. Patient Vitals for the past 12 hrs:   Temp Pulse Resp BP SpO2   01/01/22 1318 -- 91 -- (!) 161/93 98 %   01/01/22 0949 98.2 °F (36.8 °C) 98 16 (!) 148/95 100 %       Records Reviewed: Nursing Notes    Provider Notes (Medical Decision Making):   42-year-old male presenting with hemorrhagic cystitis secondary to Beard catheter placed 3 days ago due to right-sided hydroureteronephrosis due to enlarged prostate. He is afebrile and vital signs are stable. No signs of sepsis. No need to repeat CT imaging today. Laboratory evaluation demonstrates no significant leukocytosis and renal dysfunction appears to be improved likely due to Beard catheter. ED RN manually irrigated Beard catheter and replaced. Urinalysis appears consistent with infection and hemorrhagic cystitis. Will treat with Rocephin and start on p.o. antibiotics. Patient has follow-up with urology this week. Given strict return precautions all questions answered. ED Course:   Initial assessment performed. The patients presenting problems have been discussed, and they are in agreement with the care plan formulated and outlined with them. I have encouraged them to ask questions as they arise throughout their visit.     ED Course as of 01/01/22 1653   Sat Jan 01, 2022   1315 Creatinine(!): 1.53  Creatinine significantly improved from a few days ago, likely postobstructive. [AK]   1316 URINALYSIS W/ REFLEX CULTURE(!):    Color RED   Appearance TURBID(!)   Specific gravity 1.015   pH (UA) 5.0   Protein >300(!)   Glucose >1,000(!)   Ketone 15(!)   Bilirubin Negative   Blood LARGE(!)   Urobilinogen 1.0   Nitrites Positive(!)   Leukocyte Esterase TRACE(!)   WBC 10-20   RBC >100(!)   Epithelial cells FEW   Bacteria 2+(!)   UA:UC IF INDICATED URINE CULTURE ORDERED(!)   Hyaline cast 0-2  Urinalysis with significant sign of infection with positive nitrites and 2+ bacteriuria, also with RBC and large blood. Likely consistent with hemorrhagic cystitis likely from Beard catheter placement. [AK]      ED Course User Index  [AK] Aly Benavides MD       Discharge Note:  The patient has been re-evaluated and is ready for discharge. Reviewed available results with patient. Counseled patient on diagnosis and care plan. Patient has expressed understanding, and all questions have been answered. Patient agrees with plan and agrees to follow up as recommended, or to return to the ED if their symptoms worsen. Discharge instructions have been provided and explained to the patient, along with reasons to return to the ED. Disposition:  Discharge    DISCHARGE PLAN:  1. Discharge Medication List as of 1/1/2022  2:12 PM      START taking these medications    Details   cefdinir (OMNICEF) 300 mg capsule Take 1 Capsule by mouth two (2) times a day for 7 days. , Normal, Disp-14 Capsule, R-0         CONTINUE these medications which have NOT CHANGED    Details   tamsulosin (Flomax) 0.4 mg capsule Take 1 Capsule by mouth daily for 15 days. , Normal, Disp-15 Capsule, R-0      ondansetron (ZOFRAN ODT) 4 mg disintegrating tablet Take 1 Tablet by mouth every eight (8) hours as needed for Nausea or Vomiting., Normal, Disp-20 Tablet, R-0      DULoxetine (CYMBALTA) 60 mg capsule Take 1 Capsule by mouth daily. , Normal, Disp-30 Capsule, R-3      cloNIDine HCL (CATAPRES) 0.1 mg tablet Take 1 Tablet by mouth two (2) times a day for 30 days. , Normal, Disp-60 Tablet, R-0      rosuvastatin (CRESTOR) 40 mg tablet Take 1 Tablet by mouth nightly., Normal, Disp-30 Tablet, R-0      pantoprazole (Protonix) 40 mg tablet Take 1 Tablet by mouth daily. , Normal, Disp-30 Tablet, R-0      insulin aspart U-100 (NovoLOG Flexpen U-100 Insulin) 100 unit/mL (3 mL) inpn 8 Units by SubCUTAneous route Before breakfast, lunch, and dinner.  (Novolog or Humalog, whichever more preferred: Inject 5 units with each meal + correction insulin, up to 30 units per day, Normal, Disp-10 Adjustable Dose Pre-filled Pen Syringe, R-3      insulin glargine (Lantus Solostar U-100 Insulin) 100 unit/mL (3 mL) inpn INJECT 30 UNITS SUBCUTANEOUSLY DAILY, Normal, Disp-30 mL, R-4      ergocalciferol (ERGOCALCIFEROL) 1,250 mcg (50,000 unit) capsule Take 1 capsule by mouth once a week, Normal, Disp-12 Capsule, R-0      pregabalin (Lyrica) 75 mg capsule Take 75 mg by mouth two (2) times a day., Historical Med      lisinopriL (PRINIVIL, ZESTRIL) 20 mg tablet Take 1 Tablet by mouth daily. , Normal, Disp-30 Tablet, R-3           2. Follow-up Information     Follow up With Specialties Details Why Contact Info    Charanjit MD [facility] Schedule an appointment as soon as possible for a visit   [address]  [phone]      [facility] EMERGENCY DEPT Emergency Medicine Go to  As needed, If symptoms worsen [address] [place] [address]  [address] [facility]  [phone]        3. Return to ED if worse     Diagnosis     Clinical Impression:   1. Hemorrhagic cystitis    2. Problem with urinary catheter [facility])        Attestations:  I am the first and primary provider of record for this patient's ED encounter. I personally performed the services described above in this documentation. Tonio Hogan MD    Please note that this dictation was completed with [employer], the computer voice recognition software. Quite often unanticipated grammatical, syntax, homophones, and other interpretive errors are inadvertently transcribed by the computer software. Please disregard these errors. Please excuse any errors that have escaped final proofreading. Thank you.

## 2022-01-01 NOTE — DISCHARGE INSTRUCTIONS
You were evaluated in the emergency department for blood in urine and urinary catheter problem. Your examination was reassuring as was your work-up including blood work and urinalysis, you have signs of a urinary tract flexion due to your old catheter which is causing this blood. It will be important for you to follow-up with your [address] [place] [address] Urology in 3-5 days. If you develop worsening symptoms such as fevers, worsening lower abdominal pain, or further problems with your Beard catheter, please return to the emergency department immediately. It was a pleasure taking care of you at [facility] today. We know that when you come to [address] [place] [address], you are entrusting us with your health, comfort, and safety. Our physicians and nurses honor that trust, and we truly appreciate the opportunity to care for you and your loved ones. We also value your feedback. If you receive a survey about your Emergency Department experience today, please fill it out. We care about our patients' feedback, and we listen to what you have to say. Thank you!

## 2022-01-01 NOTE — ED NOTES
Assumed care of pt from triage. Pt notes lower middle/right abdominal discomfort as well as bloody urine drainage from hansen since this am. Bladder scan shows 0-27 mL, bloody urine noted in drainage leg bag. Hansen was placed 4 days ago at this facility d/t urinary retention, pt has urology apt scheduled     1100 Delay in blood work d/t difficulty obtaining IV.     [phone] at bedside     1130 Hansen irrigation performed w/ 1 L sterile water. Output initially sanguinous but blood tinged after 1 L. Leg bag replaced     1240 Initial IV infiltrated. Second  IV line placed and pt mediated per orders. 1315 Attempted to medicate w/ prn IV med, IV infiltrated, notified MD.     1340 Hansen cath changed, pt tolerated procedure well     1420 Pt medicated per orders. Pt discharged by Dr Dulce Guo. Pt provided with discharge instructions Rx and instructions on follow up care.  Pt ambulatory  out of ED

## 2022-01-03 LAB
BACTERIA SPEC CULT: ABNORMAL
CC UR VC: ABNORMAL
SERVICE CMNT-IMP: ABNORMAL

## 2022-01-27 DIAGNOSIS — E78.5 HYPERLIPIDEMIA, UNSPECIFIED HYPERLIPIDEMIA TYPE: ICD-10-CM

## 2022-01-27 RX ORDER — ROSUVASTATIN CALCIUM 40 MG/1
40 TABLET, COATED ORAL
Qty: 30 TABLET | Refills: 0 | Status: SHIPPED | OUTPATIENT
Start: 2022-01-27 | End: 2022-05-19 | Stop reason: SDUPTHER

## 2022-01-27 NOTE — TELEPHONE ENCOUNTER
Requested Prescriptions     Pending Prescriptions Disp Refills    rosuvastatin (CRESTOR) 40 mg tablet 30 Tablet 0     Sig: Take 1 Tablet by mouth nightly.         Last Visit 12/06/22  Last Refill 12/06/22  Pt would also like a refill on the clonidine

## 2022-02-08 ENCOUNTER — APPOINTMENT (OUTPATIENT)
Dept: CT IMAGING | Age: 42
DRG: 420 | End: 2022-02-08
Attending: STUDENT IN AN ORGANIZED HEALTH CARE EDUCATION/TRAINING PROGRAM
Payer: COMMERCIAL

## 2022-02-08 ENCOUNTER — HOSPITAL ENCOUNTER (INPATIENT)
Age: 42
LOS: 6 days | Discharge: HOME | DRG: 420 | End: 2022-02-14
Attending: EMERGENCY MEDICINE | Admitting: INTERNAL MEDICINE
Payer: COMMERCIAL

## 2022-02-08 ENCOUNTER — APPOINTMENT (OUTPATIENT)
Dept: GENERAL RADIOLOGY | Age: 42
DRG: 420 | End: 2022-02-08
Attending: STUDENT IN AN ORGANIZED HEALTH CARE EDUCATION/TRAINING PROGRAM
Payer: COMMERCIAL

## 2022-02-08 DIAGNOSIS — N17.9 AKI (ACUTE KIDNEY INJURY) (HCC): ICD-10-CM

## 2022-02-08 DIAGNOSIS — K92.0 HEMATEMESIS WITH NAUSEA: ICD-10-CM

## 2022-02-08 DIAGNOSIS — E11.9 DM TYPE 2, GOAL HBA1C < 7% (HCC): ICD-10-CM

## 2022-02-08 DIAGNOSIS — E10.10 DIABETIC KETOACIDOSIS WITHOUT COMA ASSOCIATED WITH TYPE 1 DIABETES MELLITUS (HCC): Primary | ICD-10-CM

## 2022-02-08 DIAGNOSIS — M25.551 RIGHT HIP PAIN: ICD-10-CM

## 2022-02-08 LAB
ABO + RH BLD: NORMAL
ADMINISTERED INITIALS, ADMINIT: NORMAL
ALBUMIN SERPL-MCNC: 1.5 G/DL (ref 3.5–5)
ALBUMIN/GLOB SERPL: 0.3 {RATIO} (ref 1.1–2.2)
ALP SERPL-CCNC: 228 U/L (ref 45–117)
ALT SERPL-CCNC: 15 U/L (ref 12–78)
ANION GAP SERPL CALC-SCNC: 27 MMOL/L (ref 5–15)
ANION GAP SERPL CALC-SCNC: 28 MMOL/L (ref 5–15)
ANION GAP SERPL CALC-SCNC: 29 MMOL/L (ref 5–15)
APPEARANCE UR: ABNORMAL
AST SERPL-CCNC: 7 U/L (ref 15–37)
BACTERIA URNS QL MICRO: ABNORMAL /HPF
BASE DEFICIT BLD-SCNC: 16.3 MMOL/L
BASOPHILS # BLD: 0.1 K/UL (ref 0–0.1)
BASOPHILS NFR BLD: 1 % (ref 0–1)
BILIRUB SERPL-MCNC: 0.5 MG/DL (ref 0.2–1)
BILIRUB UR QL: NEGATIVE
BLOOD GROUP ANTIBODIES SERPL: NORMAL
BUN SERPL-MCNC: 43 MG/DL (ref 6–20)
BUN SERPL-MCNC: 43 MG/DL (ref 6–20)
BUN SERPL-MCNC: 44 MG/DL (ref 6–20)
BUN/CREAT SERPL: 18 (ref 12–20)
BUN/CREAT SERPL: 18 (ref 12–20)
BUN/CREAT SERPL: 19 (ref 12–20)
CA-I BLD-MCNC: 1.11 MMOL/L (ref 1.12–1.32)
CALCIUM SERPL-MCNC: 8.4 MG/DL (ref 8.5–10.1)
CALCIUM SERPL-MCNC: 8.5 MG/DL (ref 8.5–10.1)
CALCIUM SERPL-MCNC: 9.4 MG/DL (ref 8.5–10.1)
CHLORIDE BLD-SCNC: 99 MMOL/L (ref 100–108)
CHLORIDE SERPL-SCNC: 88 MMOL/L (ref 97–108)
CHLORIDE SERPL-SCNC: 92 MMOL/L (ref 97–108)
CHLORIDE SERPL-SCNC: 99 MMOL/L (ref 97–108)
CO2 BLD-SCNC: 10 MMOL/L (ref 19–24)
CO2 SERPL-SCNC: 10 MMOL/L (ref 21–32)
CO2 SERPL-SCNC: 11 MMOL/L (ref 21–32)
CO2 SERPL-SCNC: 7 MMOL/L (ref 21–32)
COLOR UR: ABNORMAL
CREAT SERPL-MCNC: 2.3 MG/DL (ref 0.7–1.3)
CREAT SERPL-MCNC: 2.38 MG/DL (ref 0.7–1.3)
CREAT SERPL-MCNC: 2.42 MG/DL (ref 0.7–1.3)
CREAT UR-MCNC: 2.1 MG/DL (ref 0.6–1.3)
D50 ADMINISTERED, D50ADM: 0 ML
D50 ORDER, D50ORD: 0 ML
DIFFERENTIAL METHOD BLD: ABNORMAL
EOSINOPHIL # BLD: 0 K/UL (ref 0–0.4)
EOSINOPHIL NFR BLD: 0 % (ref 0–7)
EPITH CASTS URNS QL MICRO: ABNORMAL /LPF
ERYTHROCYTE [DISTWIDTH] IN BLOOD BY AUTOMATED COUNT: 13.9 % (ref 11.5–14.5)
EST. AVERAGE GLUCOSE BLD GHB EST-MCNC: 326 MG/DL
GLOBULIN SER CALC-MCNC: 5 G/DL (ref 2–4)
GLSCOM COMMENTS: NORMAL
GLUCOSE BLD STRIP.AUTO-MCNC: >600 MG/DL (ref 65–117)
GLUCOSE BLD STRIP.AUTO-MCNC: >700 MG/DL (ref 74–106)
GLUCOSE SERPL-MCNC: 760 MG/DL (ref 65–100)
GLUCOSE SERPL-MCNC: 869 MG/DL (ref 65–100)
GLUCOSE SERPL-MCNC: 911 MG/DL (ref 65–100)
GLUCOSE UR STRIP.AUTO-MCNC: >1000 MG/DL
GLUCOSE, GLC: 601 MG/DL
HBA1C MFR BLD: 13 % (ref 4–5.6)
HCO3 BLDA-SCNC: 9 MMOL/L
HCT VFR BLD AUTO: 37.3 % (ref 36.6–50.3)
HGB BLD-MCNC: 12 G/DL (ref 12.1–17)
HGB UR QL STRIP: ABNORMAL
HIGH TARGET, HITG: 250 MG/DL
HYALINE CASTS URNS QL MICRO: ABNORMAL /LPF (ref 0–5)
IMM GRANULOCYTES # BLD AUTO: 0.1 K/UL (ref 0–0.04)
IMM GRANULOCYTES NFR BLD AUTO: 1 % (ref 0–0.5)
INR PPP: 1 (ref 0.9–1.1)
INSULIN ADMINSTERED, INSADM: 10.8 UNITS/HOUR
INSULIN ADMINSTERED, INSADM: 16.2 UNITS/HOUR
INSULIN ADMINSTERED, INSADM: 21.6 UNITS/HOUR
INSULIN ORDER, INSORD: 10.8 UNITS/HOUR
INSULIN ORDER, INSORD: 16.2 UNITS/HOUR
INSULIN ORDER, INSORD: 21.6 UNITS/HOUR
KETONES UR QL STRIP.AUTO: 80 MG/DL
LACTATE BLD-SCNC: 3.77 MMOL/L (ref 0.4–2)
LACTATE SERPL-SCNC: 2.5 MMOL/L (ref 0.4–2)
LACTATE SERPL-SCNC: 2.6 MMOL/L (ref 0.4–2)
LEUKOCYTE ESTERASE UR QL STRIP.AUTO: ABNORMAL
LIPASE SERPL-CCNC: 39 U/L (ref 73–393)
LOW TARGET, LOT: 150 MG/DL
LYMPHOCYTES # BLD: 0.7 K/UL (ref 0.8–3.5)
LYMPHOCYTES NFR BLD: 6 % (ref 12–49)
MAGNESIUM SERPL-MCNC: 2.7 MG/DL (ref 1.6–2.4)
MCH RBC QN AUTO: 29.9 PG (ref 26–34)
MCHC RBC AUTO-ENTMCNC: 32.2 G/DL (ref 30–36.5)
MCV RBC AUTO: 93 FL (ref 80–99)
MINUTES UNTIL NEXT BG, NBG: 60 MIN
MONOCYTES # BLD: 0.3 K/UL (ref 0–1)
MONOCYTES NFR BLD: 3 % (ref 5–13)
MULTIPLIER, MUL: 0.02
MULTIPLIER, MUL: 0.03
MULTIPLIER, MUL: 0.04
NEUTS SEG # BLD: 10.2 K/UL (ref 1.8–8)
NEUTS SEG NFR BLD: 89 % (ref 32–75)
NITRITE UR QL STRIP.AUTO: NEGATIVE
NRBC # BLD: 0 K/UL (ref 0–0.01)
NRBC BLD-RTO: 0 PER 100 WBC
ORDER INITIALS, ORDINIT: NORMAL
PCO2 BLDV: 20.5 MMHG (ref 41–51)
PH BLDV: 7.25 [PH] (ref 7.32–7.42)
PH UR STRIP: 5.5 [PH] (ref 5–8)
PHOSPHATE SERPL-MCNC: 8.5 MG/DL (ref 2.6–4.7)
PLATELET # BLD AUTO: 547 K/UL (ref 150–400)
PMV BLD AUTO: 10.2 FL (ref 8.9–12.9)
PO2 BLDV: 39 MMHG (ref 25–40)
POTASSIUM BLD-SCNC: 4.6 MMOL/L (ref 3.5–5.5)
POTASSIUM SERPL-SCNC: 4.3 MMOL/L (ref 3.5–5.1)
POTASSIUM SERPL-SCNC: 5 MMOL/L (ref 3.5–5.1)
POTASSIUM SERPL-SCNC: 5 MMOL/L (ref 3.5–5.1)
PROT SERPL-MCNC: 6.5 G/DL (ref 6.4–8.2)
PROT UR STRIP-MCNC: 300 MG/DL
PROTHROMBIN TIME: 10.7 SEC (ref 9–11.1)
RBC # BLD AUTO: 4.01 M/UL (ref 4.1–5.7)
RBC #/AREA URNS HPF: ABNORMAL /HPF (ref 0–5)
RBC MORPH BLD: ABNORMAL
SERVICE CMNT-IMP: ABNORMAL
SODIUM BLD-SCNC: 127 MMOL/L (ref 136–145)
SODIUM SERPL-SCNC: 128 MMOL/L (ref 136–145)
SODIUM SERPL-SCNC: 130 MMOL/L (ref 136–145)
SODIUM SERPL-SCNC: 133 MMOL/L (ref 136–145)
SP GR UR REFRACTOMETRY: 1.02 (ref 1–1.03)
SPECIMEN EXP DATE BLD: NORMAL
SPECIMEN SITE: ABNORMAL
UROBILINOGEN UR QL STRIP.AUTO: 0.2 EU/DL (ref 0.2–1)
WBC # BLD AUTO: 11.4 K/UL (ref 4.1–11.1)
WBC URNS QL MICRO: >100 /HPF (ref 0–4)

## 2022-02-08 PROCEDURE — 87086 URINE CULTURE/COLONY COUNT: CPT

## 2022-02-08 PROCEDURE — 96374 THER/PROPH/DIAG INJ IV PUSH: CPT

## 2022-02-08 PROCEDURE — 71045 X-RAY EXAM CHEST 1 VIEW: CPT

## 2022-02-08 PROCEDURE — 86900 BLOOD TYPING SEROLOGIC ABO: CPT

## 2022-02-08 PROCEDURE — 94761 N-INVAS EAR/PLS OXIMETRY MLT: CPT

## 2022-02-08 PROCEDURE — 84100 ASSAY OF PHOSPHORUS: CPT

## 2022-02-08 PROCEDURE — 82947 ASSAY GLUCOSE BLOOD QUANT: CPT

## 2022-02-08 PROCEDURE — 96375 TX/PRO/DX INJ NEW DRUG ADDON: CPT

## 2022-02-08 PROCEDURE — 87077 CULTURE AEROBIC IDENTIFY: CPT

## 2022-02-08 PROCEDURE — 74011636637 HC RX REV CODE- 636/637: Performed by: EMERGENCY MEDICINE

## 2022-02-08 PROCEDURE — 65660000000 HC RM CCU STEPDOWN

## 2022-02-08 PROCEDURE — 82962 GLUCOSE BLOOD TEST: CPT

## 2022-02-08 PROCEDURE — 83605 ASSAY OF LACTIC ACID: CPT

## 2022-02-08 PROCEDURE — 96361 HYDRATE IV INFUSION ADD-ON: CPT

## 2022-02-08 PROCEDURE — 74011000250 HC RX REV CODE- 250: Performed by: STUDENT IN AN ORGANIZED HEALTH CARE EDUCATION/TRAINING PROGRAM

## 2022-02-08 PROCEDURE — 74011250636 HC RX REV CODE- 250/636: Performed by: EMERGENCY MEDICINE

## 2022-02-08 PROCEDURE — 85018 HEMOGLOBIN: CPT

## 2022-02-08 PROCEDURE — 74011250636 HC RX REV CODE- 250/636: Performed by: STUDENT IN AN ORGANIZED HEALTH CARE EDUCATION/TRAINING PROGRAM

## 2022-02-08 PROCEDURE — C9113 INJ PANTOPRAZOLE SODIUM, VIA: HCPCS | Performed by: STUDENT IN AN ORGANIZED HEALTH CARE EDUCATION/TRAINING PROGRAM

## 2022-02-08 PROCEDURE — 85610 PROTHROMBIN TIME: CPT

## 2022-02-08 PROCEDURE — 83735 ASSAY OF MAGNESIUM: CPT

## 2022-02-08 PROCEDURE — 80048 BASIC METABOLIC PNL TOTAL CA: CPT

## 2022-02-08 PROCEDURE — 83036 HEMOGLOBIN GLYCOSYLATED A1C: CPT

## 2022-02-08 PROCEDURE — 36415 COLL VENOUS BLD VENIPUNCTURE: CPT

## 2022-02-08 PROCEDURE — 87186 SC STD MICRODIL/AGAR DIL: CPT

## 2022-02-08 PROCEDURE — 85025 COMPLETE CBC W/AUTO DIFF WBC: CPT

## 2022-02-08 PROCEDURE — 83690 ASSAY OF LIPASE: CPT

## 2022-02-08 PROCEDURE — 80053 COMPREHEN METABOLIC PANEL: CPT

## 2022-02-08 PROCEDURE — 81001 URINALYSIS AUTO W/SCOPE: CPT

## 2022-02-08 PROCEDURE — 74011000250 HC RX REV CODE- 250: Performed by: EMERGENCY MEDICINE

## 2022-02-08 PROCEDURE — 99285 EMERGENCY DEPT VISIT HI MDM: CPT

## 2022-02-08 PROCEDURE — 74011250636 HC RX REV CODE- 250/636: Performed by: INTERNAL MEDICINE

## 2022-02-08 PROCEDURE — 74011000258 HC RX REV CODE- 258: Performed by: EMERGENCY MEDICINE

## 2022-02-08 RX ORDER — BISACODYL 5 MG
5 TABLET, DELAYED RELEASE (ENTERIC COATED) ORAL DAILY PRN
Status: DISCONTINUED | OUTPATIENT
Start: 2022-02-08 | End: 2022-02-14 | Stop reason: HOSPADM

## 2022-02-08 RX ORDER — PROMETHAZINE HYDROCHLORIDE 25 MG/1
12.5 TABLET ORAL
Status: DISCONTINUED | OUTPATIENT
Start: 2022-02-08 | End: 2022-02-14 | Stop reason: HOSPADM

## 2022-02-08 RX ORDER — DEXTROSE MONOHYDRATE 100 MG/ML
150-250 INJECTION, SOLUTION INTRAVENOUS AS NEEDED
Status: DISCONTINUED | OUTPATIENT
Start: 2022-02-08 | End: 2022-02-09

## 2022-02-08 RX ORDER — ACETAMINOPHEN 325 MG/1
650 TABLET ORAL
Status: DISCONTINUED | OUTPATIENT
Start: 2022-02-08 | End: 2022-02-14 | Stop reason: HOSPADM

## 2022-02-08 RX ORDER — MAGNESIUM SULFATE 100 %
4 CRYSTALS MISCELLANEOUS AS NEEDED
Status: DISCONTINUED | OUTPATIENT
Start: 2022-02-08 | End: 2022-02-09

## 2022-02-08 RX ORDER — INSULIN LISPRO 100 [IU]/ML
INJECTION, SOLUTION INTRAVENOUS; SUBCUTANEOUS
Status: DISCONTINUED | OUTPATIENT
Start: 2022-02-09 | End: 2022-02-09

## 2022-02-08 RX ORDER — MORPHINE SULFATE 2 MG/ML
4 INJECTION, SOLUTION INTRAMUSCULAR; INTRAVENOUS
Status: COMPLETED | OUTPATIENT
Start: 2022-02-08 | End: 2022-02-08

## 2022-02-08 RX ORDER — MORPHINE SULFATE 2 MG/ML
2 INJECTION, SOLUTION INTRAMUSCULAR; INTRAVENOUS
Status: DISCONTINUED | OUTPATIENT
Start: 2022-02-08 | End: 2022-02-14 | Stop reason: HOSPADM

## 2022-02-08 RX ORDER — ONDANSETRON 2 MG/ML
4 INJECTION INTRAMUSCULAR; INTRAVENOUS
Status: DISCONTINUED | OUTPATIENT
Start: 2022-02-08 | End: 2022-02-14 | Stop reason: HOSPADM

## 2022-02-08 RX ORDER — POTASSIUM CHLORIDE AND SODIUM CHLORIDE 900; 300 MG/100ML; MG/100ML
INJECTION, SOLUTION INTRAVENOUS CONTINUOUS
Status: DISCONTINUED | OUTPATIENT
Start: 2022-02-08 | End: 2022-02-09

## 2022-02-08 RX ADMIN — POTASSIUM CHLORIDE AND SODIUM CHLORIDE: 900; 300 INJECTION, SOLUTION INTRAVENOUS at 21:30

## 2022-02-08 RX ADMIN — SODIUM CHLORIDE 40 MG: 9 INJECTION INTRAMUSCULAR; INTRAVENOUS; SUBCUTANEOUS at 19:26

## 2022-02-08 RX ADMIN — SODIUM CHLORIDE 1000 ML: 9 INJECTION, SOLUTION INTRAVENOUS at 21:26

## 2022-02-08 RX ADMIN — SODIUM CHLORIDE 10.8 UNITS/HR: 9 INJECTION, SOLUTION INTRAVENOUS at 21:26

## 2022-02-08 RX ADMIN — SODIUM CHLORIDE 1000 ML: 9 INJECTION, SOLUTION INTRAVENOUS at 19:27

## 2022-02-08 RX ADMIN — MORPHINE SULFATE 4 MG: 2 INJECTION, SOLUTION INTRAMUSCULAR; INTRAVENOUS at 21:29

## 2022-02-08 RX ADMIN — SODIUM CHLORIDE 10 MG: 9 INJECTION INTRAMUSCULAR; INTRAVENOUS; SUBCUTANEOUS at 19:25

## 2022-02-08 NOTE — ED PROVIDER NOTES
EMERGENCY DEPARTMENT HISTORY AND PHYSICAL EXAM          Date: 2/8/2022  Patient Name: Rupesh Bowden  Attending of Record: Agnes Ribeiro     History of Presenting Illness     Chief Complaint   Patient presents with    Vomiting     pt arrived via ems with nausea and vomiting x4 days. Says has not been able toeat or drink anything       History Provided By: Patient    HPI: Rupesh Bowden is a 46 y.o. male with a PMH of type 1 diabetes, hypertension, hypercholesterolemia who presents today for abdominal pain, n/v. Pt reports this has been going on a few months now. He recently went to [facility] 2 days ago for similar thing and was sent home. Today he started vomiting blood which is new for him. He states that he was a heavy drinker in the past but hasn't had a drink in 10 years. His pain is in the lower abdomen and also reports dysuria. He denies chest pain or SOB. He denies blood in his stool or hematuria. PCP: Tae Aguirre MD    There are no other complaints, changes, or physical findings at this time.      Current Facility-Administered Medications   Medication Dose Route Frequency Provider Last Rate Last Admin    insulin regular (NOVOLIN R, HUMULIN R) 100 Units in 0.9% sodium chloride 100 mL infusion  0-50 Units/hr IntraVENous TITRATE Yolanda Rojas MD 16.2 mL/hr at 02/08/22 2233 16.2 Units/hr at 02/08/22 2233    [START ON 2/9/2022] insulin lispro (HUMALOG) injection   SubCUTAneous TIDAC Yolanda Rojas MD        glucose chewable tablet 16 g  4 Tablet Oral PRN Yolanda Rojas MD        dextrose 10% infusion  150-250 mL/hr IntraVENous PRN Yolanda Rojas MD        glucagon [facility]) injection 1 mg  1 mg IntraMUSCular PRN Yolanda Rojas MD        acetaminophen (TYLENOL) tablet 650 mg  650 mg Oral Q6H PRN Darcel Hodgkin, MD        0.9% sodium chloride with KCl 40 mEq/L infusion   IntraVENous CONTINUOUS Liam Cao  mL/hr at 02/08/22 2130 New Bag at 22    pantoprazole (PROTONIX) 40 mg in 0.9% sodium chloride 10 mL injection  40 mg IntraVENous Q12H Luis Sparks MD        morphine injection 2 mg  2 mg IntraVENous Q3H PRN Alton Herring MD        bisacodyL (DULCOLAX) tablet 5 mg  5 mg Oral DAILY PRN Alton Herring MD        promethazine (PHENERGAN) tablet 12.5 mg  12.5 mg Oral Q6H PRN Alton Herring MD        Or    ondansetron [facility]) injection 4 mg  4 mg IntraVENous Q6H PRN Alton Herring MD           Past History     Past Medical History:  Past Medical History:   Diagnosis Date    [facility]     Bipolar disorder ([facility]     Depression     Diabetes ([facility]     DKA, type 1 ([facility] [address] 2013    diagnosed age 23    H/O noncompliance with medical treatment, presenting hazards to health     MRSA (methicillin resistant staph aureus) culture positive     MRSA (methicillin resistant Staphylococcus aureus)     Face    Noncompliance with medication regimen     Second hand smoke exposure     Seizure ([address]     Seizures ([address] 2006 or 2007    one episode during [facility]       Past Surgical History:  Past Surgical History:   Procedure Laterality Date    HX HEENT      top left wisdom tooth    HX ORTHOPAEDIC Left     wrist; [facility]    UPPER GI ENDOSCOPY,BIOPSY  2018            Family History:  Family History   Problem Relation Age of Onset    Diabetes Mother     Diabetes Other         neice, type 1        Social History:  Social History     Tobacco Use    Smoking status: Former Smoker     Packs/day: 0.10     Years: 16.00     Pack years: 1.60     Types: Cigarettes     Start date: 10/4/1999     Quit date: 2020     Years since quittin.9    Smokeless tobacco: Never Used   Substance Use Topics    Alcohol use: No    Drug use: No       Allergies:  No Known Allergies      Review of Systems   Review of Systems   Constitutional: Negative for chills and fever.    HENT: Negative for sore throat. Respiratory: Negative for cough and shortness of breath. Cardiovascular: Negative for chest pain. Gastrointestinal: Positive for abdominal pain, nausea and vomiting. Genitourinary: Positive for dysuria. Negative for frequency and urgency. Musculoskeletal: Negative for back pain. Skin: Negative for rash. Neurological: Negative for headaches. Psychiatric/Behavioral: Negative for confusion. Physical Exam   Physical Exam  Constitutional:       General: He is not in acute distress. HENT:      Head: Normocephalic and atraumatic. Mouth/Throat:      Mouth: Mucous membranes are moist.      Pharynx: Oropharynx is clear. Eyes:      Pupils: Pupils are equal, round, and reactive to light. Cardiovascular:      Rate and Rhythm: Normal rate and regular rhythm. Pulmonary:      Effort: Pulmonary effort is normal. No respiratory distress. Breath sounds: Normal breath sounds. Abdominal:      General: Bowel sounds are normal.      Palpations: Abdomen is soft. Tenderness: There is abdominal tenderness. Comments: Actively vomiting dark brown emesis looks coffee ground   Musculoskeletal:         General: Normal range of motion. Cervical back: Normal range of motion. Skin:     General: Skin is warm and dry. Coloration: Skin is pale. Neurological:      General: No focal deficit present. Mental Status: He is alert and oriented to person, place, and time.    Psychiatric:         Mood and Affect: Mood normal.          Diagnostic Study Results     Labs -     Recent Results (from the past 12 hour(s))   METABOLIC PANEL, COMPREHENSIVE    Collection Time: 02/08/22  7:22 PM   Result Value Ref Range    Sodium 128 (L) 136 - 145 mmol/L    Potassium 5.0 3.5 - 5.1 mmol/L    Chloride 88 (L) 97 - 108 mmol/L    CO2 11 (LL) 21 - 32 mmol/L    Anion gap 29 (H) 5 - 15 mmol/L    Glucose 869 (HH) 65 - 100 mg/dL    BUN 43 (H) 6 - 20 MG/DL    Creatinine 2.42 (H) 0.70 - 1.30 MG/DL BUN/Creatinine ratio 18 12 - 20      GFR est AA 36 (L) >60 ml/min/1.73m2    GFR est non-AA 30 (L) >60 ml/min/1.73m2    Calcium 9.4 8.5 - 10.1 MG/DL    Bilirubin, total 0.5 0.2 - 1.0 MG/DL    ALT (SGPT) 15 12 - 78 U/L    AST (SGOT) 7 (L) 15 - 37 U/L    Alk. phosphatase 228 (H) 45 - 117 U/L    Protein, total 6.5 6.4 - 8.2 g/dL    Albumin 1.5 (L) 3.5 - 5.0 g/dL    Globulin 5.0 (H) 2.0 - 4.0 g/dL    A-G Ratio 0.3 (L) 1.1 - 2.2     CBC WITH AUTOMATED DIFF    Collection Time: 02/08/22  7:22 PM   Result Value Ref Range    WBC 11.4 (H) 4.1 - 11.1 K/uL    RBC 4.01 (L) 4.10 - 5.70 M/uL    HGB 12.0 (L) 12.1 - 17.0 g/dL    HCT 37.3 36.6 - 50.3 %    MCV 93.0 80.0 - 99.0 FL    MCH 29.9 26.0 - 34.0 PG    MCHC 32.2 30.0 - 36.5 g/dL    RDW 13.9 11.5 - 14.5 %    PLATELET 949 (H) 671 - 400 K/uL    MPV 10.2 8.9 - 12.9 FL    NRBC 0.0 0  WBC    ABSOLUTE NRBC 0.00 0.00 - 0.01 K/uL    NEUTROPHILS 89 (H) 32 - 75 %    LYMPHOCYTES 6 (L) 12 - 49 %    MONOCYTES 3 (L) 5 - 13 %    EOSINOPHILS 0 0 - 7 %    BASOPHILS 1 0 - 1 %    IMMATURE GRANULOCYTES 1 (H) 0.0 - 0.5 %    ABS. NEUTROPHILS 10.2 (H) 1.8 - 8.0 K/UL    ABS. LYMPHOCYTES 0.7 (L) 0.8 - 3.5 K/UL    ABS. MONOCYTES 0.3 0.0 - 1.0 K/UL    ABS. EOSINOPHILS 0.0 0.0 - 0.4 K/UL    ABS. BASOPHILS 0.1 0.0 - 0.1 K/UL    ABS. IMM.  GRANS. 0.1 (H) 0.00 - 0.04 K/UL    DF SMEAR SCANNED      RBC COMMENTS NORMOCYTIC, NORMOCHROMIC     TYPE & SCREEN    Collection Time: 02/08/22  7:22 PM   Result Value Ref Range    Crossmatch Expiration 02/11/2022,2359     ABO/Rh(D) A POSITIVE     Antibody screen NEG    PROTHROMBIN TIME + INR    Collection Time: 02/08/22  7:22 PM   Result Value Ref Range    INR 1.0 0.9 - 1.1      Prothrombin time 10.7 9.0 - 11.1 sec   LACTIC ACID    Collection Time: 02/08/22  7:22 PM   Result Value Ref Range    Lactic acid 2.5 (HH) 0.4 - 2.0 MMOL/L   LIPASE    Collection Time: 02/08/22  7:22 PM   Result Value Ref Range    Lipase 39 (L) 73 - 393 U/L   GLUCOSE, POC    Collection Time: 02/08/22  7:23 PM   Result Value Ref Range    Glucose (POC) >600 (HH) 65 - 117 mg/dL    Performed by Marko Cueto RN    URINALYSIS W/ RFLX MICROSCOPIC    Collection Time: 02/08/22  7:56 PM   Result Value Ref Range    Color YELLOW/STRAW      Appearance TURBID (A) CLEAR      Specific gravity 1.024 1.003 - 1.030      pH (UA) 5.5 5.0 - 8.0      Protein 300 (A) NEG mg/dL    Glucose >1,000 (A) NEG mg/dL    Ketone 80 (A) NEG mg/dL    Bilirubin Negative NEG      Blood SMALL (A) NEG      Urobilinogen 0.2 0.2 - 1.0 EU/dL    Nitrites Negative NEG      Leukocyte Esterase MODERATE (A) NEG      WBC >100 (H) 0 - 4 /hpf    RBC 0-5 0 - 5 /hpf    Epithelial cells FEW FEW /lpf    Bacteria 4+ (A) NEG /hpf    Hyaline cast 0-2 0 - 5 /lpf   BLOOD GAS,CHEM8,LACTIC ACID POC    Collection Time: 02/08/22  8:15 PM   Result Value Ref Range    Calcium, ionized (POC) 1.11 (L) 1.12 - 1.32 mmol/L    BICARBONATE 9 mmol/L    Base deficit (POC) 16.3 mmol/L    Sample source VENOUS BLOOD      CO2, POC 10 (L) 19 - 24 MMOL/L    Sodium,  (L) 136 - 145 MMOL/L    Potassium, POC 4.6 3.5 - 5.5 MMOL/L    Chloride, POC 99 (L) 100 - 108 MMOL/L    Glucose, POC >700 (HH) 74 - 106 MG/DL    Creatinine, POC 2.1 (H) 0.6 - 1.3 MG/DL    Lactic Acid (POC) 3.77 (HH) 0.40 - 2.00 mmol/L    pH, venous (POC) 7.25 (L) 7.32 - 7.42      pCO2, venous (POC) 20.5 (L) 41 - 51 MMHG    pO2, venous (POC) 39 25 - 40 mmHg   METABOLIC PANEL, BASIC    Collection Time: 02/08/22  8:34 PM   Result Value Ref Range    Sodium 130 (L) 136 - 145 mmol/L    Potassium 5.0 3.5 - 5.1 mmol/L    Chloride 92 (L) 97 - 108 mmol/L    CO2 10 (LL) 21 - 32 mmol/L    Anion gap 28 (H) 5 - 15 mmol/L    Glucose 911 (HH) 65 - 100 mg/dL    BUN 43 (H) 6 - 20 MG/DL    Creatinine 2.30 (H) 0.70 - 1.30 MG/DL    BUN/Creatinine ratio 19 12 - 20      GFR est AA 39 (L) >60 ml/min/1.73m2    GFR est non-AA 32 (L) >60 ml/min/1.73m2    Calcium 8.5 8.5 - 10.1 MG/DL   PHOSPHORUS    Collection Time: 02/08/22  8:34 PM Result Value Ref Range    Phosphorus 8.5 (H) 2.6 - 4.7 MG/DL   HEMOGLOBIN A1C WITH EAG    Collection Time: 02/08/22  8:34 PM   Result Value Ref Range    Hemoglobin A1c 13.0 (H) 4.0 - 5.6 %    Est. average glucose 326 mg/dL   GLUCOSE, POC    Collection Time: 02/08/22  9:23 PM   Result Value Ref Range    Glucose (POC) >600 (HH) 65 - 117 mg/dL    Performed by Roderick Kim (GO RN)    GLUCOSTABILIZER    Collection Time: 02/08/22  9:25 PM   Result Value Ref Range    Glucose 601 mg/dL    Insulin order 10.8 units/hour    Insulin adminstered 10.8 units/hour    Multiplier 0.020     Low target 150 mg/dL    High target 250 mg/dL    D50 order 0.0 ml    D50 administered 0.00 ml    Minutes until next BG 60 min    Order initials RP     Administered initials DR Glory Borjas Comments     GLUCOSE, POC    Collection Time: 02/08/22 10:30 PM   Result Value Ref Range    Glucose (POC) >600 (HH) 65 - 117 mg/dL    Performed by Ines Santamaria    Collection Time: 02/08/22 10:33 PM   Result Value Ref Range    Glucose 601 mg/dL    Insulin order 16.2 units/hour    Insulin adminstered 16.2 units/hour    Multiplier 0.030     Low target 150 mg/dL    High target 250 mg/dL    D50 order 0.0 ml    D50 administered 0.00 ml    Minutes until next BG 60 min    Order initials jv     Administered initials jv     GLSCOM Comments         Radiologic Studies -   XR CHEST PORT   Final Result   No acute process identified by portable radiography        CT Results  (Last 48 hours)    None        CXR Results  (Last 48 hours)               02/08/22 1952  XR CHEST PORT Final result    Impression:  No acute process identified by portable radiography       Narrative:  EXAM: XR CHEST PORT       INDICATION: hematemesis       COMPARISON: 12/2/2021       FINDINGS: A portable AP radiograph of the chest was obtained at 1941 hours. The   patient is on a cardiac monitor. The lungs are clear.  The cardiac and   mediastinal contours and pulmonary vascularity are normal.  The bones and soft   tissues are grossly within normal limits. Medical Decision Making   I am the first provider for this patient. I reviewed the vital signs, available nursing notes, past medical history, past surgical history, family history and social history. Vital Signs-Reviewed the patient's vital signs. Patient Vitals for the past 12 hrs:   Temp Pulse Resp BP SpO2   02/08/22 2153 97.6 °F (36.4 °C) 100 17 124/66 100 %   02/08/22 2125 -- -- -- (!) 153/82 100 %   02/08/22 2055 -- -- -- 139/67 100 %   02/08/22 1834 97.7 °F (36.5 °C) 100 28 (!) 175/24 100 %           Records Reviewed: Nursing Notes and Old Medical Records    Provider Notes (Medical Decision Making):   Pt presents with abdominal pain and hematemesis. Pt may have a component of gastroparesis resulting his chronic abdominal pain and nausea. Hematemesis could be due to repeated episodes of vomiting and irritation of the esophagus causing a Lillie davidson tear. DKA is also on the differential. Will plan to provide symptomatic control, fluids, and get labs    ED Course and Progress Notes:   Initial assessment performed. The patients presenting problems have been discussed, and they are in agreement with the care plan formulated and outlined with them. I have encouraged them to ask questions as they arise throughout their visit. Diagnosis     Clinical Impression:   1. Diabetic ketoacidosis without coma associated with type 1 diabetes mellitus ([facility] [address]    2. FELECIA (acute kidney injury) ([facility] [address]    3.  Hematemesis with nausea        Disposition:  Admitted to the hospital             Resident Signature: Quentin Coombs MD

## 2022-02-09 ENCOUNTER — APPOINTMENT (OUTPATIENT)
Dept: ULTRASOUND IMAGING | Age: 42
DRG: 420 | End: 2022-02-09
Attending: STUDENT IN AN ORGANIZED HEALTH CARE EDUCATION/TRAINING PROGRAM
Payer: COMMERCIAL

## 2022-02-09 PROBLEM — K92.0 COFFEE GROUND EMESIS: Status: ACTIVE | Noted: 2022-02-09

## 2022-02-09 PROBLEM — U07.1 COVID-19: Status: ACTIVE | Noted: 2022-02-09

## 2022-02-09 LAB
ADMINISTERED INITIALS, ADMINIT: NORMAL
ANION GAP SERPL CALC-SCNC: 14 MMOL/L (ref 5–15)
ANION GAP SERPL CALC-SCNC: 5 MMOL/L (ref 5–15)
ANION GAP SERPL CALC-SCNC: 7 MMOL/L (ref 5–15)
ATRIAL RATE: 88 BPM
BASE DEFICIT BLDV-SCNC: 5.1 MMOL/L
BASOPHILS # BLD: 0.2 K/UL (ref 0–0.1)
BASOPHILS NFR BLD: 1 % (ref 0–1)
BUN SERPL-MCNC: 40 MG/DL (ref 6–20)
BUN SERPL-MCNC: 42 MG/DL (ref 6–20)
BUN SERPL-MCNC: 43 MG/DL (ref 6–20)
BUN/CREAT SERPL: 16 (ref 12–20)
BUN/CREAT SERPL: 18 (ref 12–20)
BUN/CREAT SERPL: 19 (ref 12–20)
CALCIUM SERPL-MCNC: 8.1 MG/DL (ref 8.5–10.1)
CALCIUM SERPL-MCNC: 8.4 MG/DL (ref 8.5–10.1)
CALCIUM SERPL-MCNC: 8.5 MG/DL (ref 8.5–10.1)
CALCULATED P AXIS, ECG09: 80 DEGREES
CALCULATED R AXIS, ECG10: 48 DEGREES
CALCULATED T AXIS, ECG11: 75 DEGREES
CHLORIDE SERPL-SCNC: 112 MMOL/L (ref 97–108)
CHLORIDE SERPL-SCNC: 114 MMOL/L (ref 97–108)
CHLORIDE SERPL-SCNC: 115 MMOL/L (ref 97–108)
CO2 SERPL-SCNC: 18 MMOL/L (ref 21–32)
CO2 SERPL-SCNC: 22 MMOL/L (ref 21–32)
CO2 SERPL-SCNC: 26 MMOL/L (ref 21–32)
COVID-19 RAPID TEST, COVR: DETECTED
CREAT SERPL-MCNC: 2.15 MG/DL (ref 0.7–1.3)
CREAT SERPL-MCNC: 2.28 MG/DL (ref 0.7–1.3)
CREAT SERPL-MCNC: 2.61 MG/DL (ref 0.7–1.3)
D50 ADMINISTERED, D50ADM: 0 ML
D50 ORDER, D50ORD: 0 ML
DIAGNOSIS, 93000: NORMAL
DIFFERENTIAL METHOD BLD: ABNORMAL
EOSINOPHIL # BLD: 0 K/UL (ref 0–0.4)
EOSINOPHIL NFR BLD: 0 % (ref 0–7)
ERYTHROCYTE [DISTWIDTH] IN BLOOD BY AUTOMATED COUNT: 14.6 % (ref 11.5–14.5)
GLSCOM COMMENTS: NORMAL
GLUCOSE BLD STRIP.AUTO-MCNC: 105 MG/DL (ref 65–117)
GLUCOSE BLD STRIP.AUTO-MCNC: 112 MG/DL (ref 65–117)
GLUCOSE BLD STRIP.AUTO-MCNC: 122 MG/DL (ref 65–117)
GLUCOSE BLD STRIP.AUTO-MCNC: 128 MG/DL (ref 65–117)
GLUCOSE BLD STRIP.AUTO-MCNC: 158 MG/DL (ref 65–117)
GLUCOSE BLD STRIP.AUTO-MCNC: 195 MG/DL (ref 65–117)
GLUCOSE BLD STRIP.AUTO-MCNC: 250 MG/DL (ref 65–117)
GLUCOSE BLD STRIP.AUTO-MCNC: 271 MG/DL (ref 65–117)
GLUCOSE BLD STRIP.AUTO-MCNC: 368 MG/DL (ref 65–117)
GLUCOSE BLD STRIP.AUTO-MCNC: 435 MG/DL (ref 65–117)
GLUCOSE BLD STRIP.AUTO-MCNC: 554 MG/DL (ref 65–117)
GLUCOSE BLD STRIP.AUTO-MCNC: 95 MG/DL (ref 65–117)
GLUCOSE BLD STRIP.AUTO-MCNC: 98 MG/DL (ref 65–117)
GLUCOSE BLD STRIP.AUTO-MCNC: >600 MG/DL (ref 65–117)
GLUCOSE SERPL-MCNC: 136 MG/DL (ref 65–100)
GLUCOSE SERPL-MCNC: 239 MG/DL (ref 65–100)
GLUCOSE SERPL-MCNC: 522 MG/DL (ref 65–100)
GLUCOSE SERPL-MCNC: 639 MG/DL (ref 65–100)
GLUCOSE SERPL-MCNC: 86 MG/DL (ref 65–100)
GLUCOSE, GLC: 105 MG/DL
GLUCOSE, GLC: 112 MG/DL
GLUCOSE, GLC: 122 MG/DL
GLUCOSE, GLC: 128 MG/DL
GLUCOSE, GLC: 195 MG/DL
GLUCOSE, GLC: 271 MG/DL
GLUCOSE, GLC: 368 MG/DL
GLUCOSE, GLC: 435 MG/DL
GLUCOSE, GLC: 554 MG/DL
GLUCOSE, GLC: 601 MG/DL
GLUCOSE, GLC: 95 MG/DL
GLUCOSE, GLC: 98 MG/DL
HCO3 BLDV-SCNC: 21.2 MMOL/L (ref 23–28)
HCT VFR BLD AUTO: 28.2 % (ref 36.6–50.3)
HCT VFR BLD AUTO: 34.1 % (ref 36.6–50.3)
HGB BLD-MCNC: 11.1 G/DL (ref 12.1–17)
HGB BLD-MCNC: 9 G/DL (ref 12.1–17)
HIGH TARGET, HITG: 250 MG/DL
IMM GRANULOCYTES # BLD AUTO: 0.3 K/UL (ref 0–0.04)
IMM GRANULOCYTES NFR BLD AUTO: 2 % (ref 0–0.5)
INSULIN ADMINSTERED, INSADM: 0.6 UNITS/HOUR
INSULIN ADMINSTERED, INSADM: 0.9 UNITS/HOUR
INSULIN ADMINSTERED, INSADM: 1.2 UNITS/HOUR
INSULIN ADMINSTERED, INSADM: 1.4 UNITS/HOUR
INSULIN ADMINSTERED, INSADM: 10.8 UNITS/HOUR
INSULIN ADMINSTERED, INSADM: 16.9 UNITS/HOUR
INSULIN ADMINSTERED, INSADM: 21.6 UNITS/HOUR
INSULIN ADMINSTERED, INSADM: 22.5 UNITS/HOUR
INSULIN ADMINSTERED, INSADM: 27.1 UNITS/HOUR
INSULIN ADMINSTERED, INSADM: 29.6 UNITS/HOUR
INSULIN ADMINSTERED, INSADM: 3.2 UNITS/HOUR
INSULIN ADMINSTERED, INSADM: 4.4 UNITS/HOUR
INSULIN ORDER, INSORD: 0.6 UNITS/HOUR
INSULIN ORDER, INSORD: 0.9 UNITS/HOUR
INSULIN ORDER, INSORD: 1.2 UNITS/HOUR
INSULIN ORDER, INSORD: 1.4 UNITS/HOUR
INSULIN ORDER, INSORD: 10.8 UNITS/HOUR
INSULIN ORDER, INSORD: 16.9 UNITS/HOUR
INSULIN ORDER, INSORD: 21.6 UNITS/HOUR
INSULIN ORDER, INSORD: 22.5 UNITS/HOUR
INSULIN ORDER, INSORD: 27.1 UNITS/HOUR
INSULIN ORDER, INSORD: 29.6 UNITS/HOUR
INSULIN ORDER, INSORD: 3.2 UNITS/HOUR
INSULIN ORDER, INSORD: 4.4 UNITS/HOUR
LOW TARGET, LOT: 150 MG/DL
LYMPHOCYTES # BLD: 2.5 K/UL (ref 0.8–3.5)
LYMPHOCYTES NFR BLD: 16 % (ref 12–49)
MAGNESIUM SERPL-MCNC: 2.4 MG/DL (ref 1.6–2.4)
MAGNESIUM SERPL-MCNC: 2.5 MG/DL (ref 1.6–2.4)
MAGNESIUM SERPL-MCNC: 2.5 MG/DL (ref 1.6–2.4)
MAGNESIUM SERPL-MCNC: 2.9 MG/DL (ref 1.6–2.4)
MCH RBC QN AUTO: 29.9 PG (ref 26–34)
MCHC RBC AUTO-ENTMCNC: 32.6 G/DL (ref 30–36.5)
MCV RBC AUTO: 91.9 FL (ref 80–99)
MINUTES UNTIL NEXT BG, NBG: 60 MIN
MONOCYTES # BLD: 0.5 K/UL (ref 0–1)
MONOCYTES NFR BLD: 3 % (ref 5–13)
MULTIPLIER, MUL: 0.01
MULTIPLIER, MUL: 0.02
MULTIPLIER, MUL: 0.03
MULTIPLIER, MUL: 0.04
MULTIPLIER, MUL: 0.05
MULTIPLIER, MUL: 0.05
MULTIPLIER, MUL: 0.06
MULTIPLIER, MUL: 0.07
MULTIPLIER, MUL: 0.08
MULTIPLIER, MUL: 0.08
NEUTS SEG # BLD: 12.2 K/UL (ref 1.8–8)
NEUTS SEG NFR BLD: 78 % (ref 32–75)
NRBC # BLD: 0 K/UL (ref 0–0.01)
NRBC BLD-RTO: 0 PER 100 WBC
ORDER INITIALS, ORDINIT: NORMAL
P-R INTERVAL, ECG05: 120 MS
PCO2 BLDV: 43.7 MMHG (ref 41–51)
PH BLDV: 7.29 [PH] (ref 7.32–7.42)
PLATELET # BLD AUTO: 474 K/UL (ref 150–400)
PLATELET COMMENTS,PCOM: ABNORMAL
PO2 BLDV: 64 MMHG (ref 25–40)
POTASSIUM SERPL-SCNC: 3.8 MMOL/L (ref 3.5–5.1)
POTASSIUM SERPL-SCNC: 4.2 MMOL/L (ref 3.5–5.1)
POTASSIUM SERPL-SCNC: 4.6 MMOL/L (ref 3.5–5.1)
Q-T INTERVAL, ECG07: 364 MS
QRS DURATION, ECG06: 100 MS
QTC CALCULATION (BEZET), ECG08: 440 MS
RBC # BLD AUTO: 3.71 M/UL (ref 4.1–5.7)
RBC MORPH BLD: ABNORMAL
SAO2 % BLDV: 89.5 % (ref 65–88)
SERVICE CMNT-IMP: ABNORMAL
SERVICE CMNT-IMP: NORMAL
SODIUM SERPL-SCNC: 143 MMOL/L (ref 136–145)
SODIUM SERPL-SCNC: 144 MMOL/L (ref 136–145)
SODIUM SERPL-SCNC: 146 MMOL/L (ref 136–145)
SOURCE, COVRS: ABNORMAL
SPECIMEN TYPE: ABNORMAL
TROPONIN-HIGH SENSITIVITY: 121 NG/L (ref 0–76)
TROPONIN-HIGH SENSITIVITY: 154 NG/L (ref 0–76)
VENTRICULAR RATE, ECG03: 88 BPM
WBC # BLD AUTO: 15.7 K/UL (ref 4.1–11.1)

## 2022-02-09 PROCEDURE — 76770 US EXAM ABDO BACK WALL COMP: CPT

## 2022-02-09 PROCEDURE — 83735 ASSAY OF MAGNESIUM: CPT

## 2022-02-09 PROCEDURE — 74011000258 HC RX REV CODE- 258: Performed by: INTERNAL MEDICINE

## 2022-02-09 PROCEDURE — 74011636637 HC RX REV CODE- 636/637: Performed by: EMERGENCY MEDICINE

## 2022-02-09 PROCEDURE — 82962 GLUCOSE BLOOD TEST: CPT

## 2022-02-09 PROCEDURE — 74011000250 HC RX REV CODE- 250: Performed by: NURSE PRACTITIONER

## 2022-02-09 PROCEDURE — 74011250636 HC RX REV CODE- 250/636: Performed by: INTERNAL MEDICINE

## 2022-02-09 PROCEDURE — 36415 COLL VENOUS BLD VENIPUNCTURE: CPT

## 2022-02-09 PROCEDURE — 84484 ASSAY OF TROPONIN QUANT: CPT

## 2022-02-09 PROCEDURE — C9113 INJ PANTOPRAZOLE SODIUM, VIA: HCPCS | Performed by: INTERNAL MEDICINE

## 2022-02-09 PROCEDURE — 85025 COMPLETE CBC W/AUTO DIFF WBC: CPT

## 2022-02-09 PROCEDURE — 74011636637 HC RX REV CODE- 636/637: Performed by: STUDENT IN AN ORGANIZED HEALTH CARE EDUCATION/TRAINING PROGRAM

## 2022-02-09 PROCEDURE — 74011250637 HC RX REV CODE- 250/637: Performed by: PHYSICIAN ASSISTANT

## 2022-02-09 PROCEDURE — 87635 SARS-COV-2 COVID-19 AMP PRB: CPT

## 2022-02-09 PROCEDURE — 74011250636 HC RX REV CODE- 250/636: Performed by: NURSE PRACTITIONER

## 2022-02-09 PROCEDURE — 74011250637 HC RX REV CODE- 250/637: Performed by: INTERNAL MEDICINE

## 2022-02-09 PROCEDURE — 74011000250 HC RX REV CODE- 250: Performed by: INTERNAL MEDICINE

## 2022-02-09 PROCEDURE — 74011250636 HC RX REV CODE- 250/636: Performed by: STUDENT IN AN ORGANIZED HEALTH CARE EDUCATION/TRAINING PROGRAM

## 2022-02-09 PROCEDURE — 93005 ELECTROCARDIOGRAM TRACING: CPT

## 2022-02-09 PROCEDURE — 80048 BASIC METABOLIC PNL TOTAL CA: CPT

## 2022-02-09 PROCEDURE — 82803 BLOOD GASES ANY COMBINATION: CPT

## 2022-02-09 PROCEDURE — 74011250637 HC RX REV CODE- 250/637: Performed by: STUDENT IN AN ORGANIZED HEALTH CARE EDUCATION/TRAINING PROGRAM

## 2022-02-09 PROCEDURE — 74011000258 HC RX REV CODE- 258: Performed by: EMERGENCY MEDICINE

## 2022-02-09 PROCEDURE — 65660000000 HC RM CCU STEPDOWN

## 2022-02-09 PROCEDURE — 51798 US URINE CAPACITY MEASURE: CPT

## 2022-02-09 PROCEDURE — 74011000258 HC RX REV CODE- 258: Performed by: STUDENT IN AN ORGANIZED HEALTH CARE EDUCATION/TRAINING PROGRAM

## 2022-02-09 RX ORDER — DULOXETIN HYDROCHLORIDE 30 MG/1
60 CAPSULE, DELAYED RELEASE ORAL DAILY
Status: DISCONTINUED | OUTPATIENT
Start: 2022-02-09 | End: 2022-02-10

## 2022-02-09 RX ORDER — DEXTROSE, SODIUM CHLORIDE, AND POTASSIUM CHLORIDE 5; .45; .3 G/100ML; G/100ML; G/100ML
INJECTION INTRAVENOUS CONTINUOUS
Status: DISPENSED | OUTPATIENT
Start: 2022-02-09 | End: 2022-02-09

## 2022-02-09 RX ORDER — MAGNESIUM SULFATE 100 %
4 CRYSTALS MISCELLANEOUS AS NEEDED
Status: DISCONTINUED | OUTPATIENT
Start: 2022-02-09 | End: 2022-02-14 | Stop reason: HOSPADM

## 2022-02-09 RX ORDER — INSULIN GLARGINE 100 [IU]/ML
20 INJECTION, SOLUTION SUBCUTANEOUS
Status: DISCONTINUED | OUTPATIENT
Start: 2022-02-10 | End: 2022-02-10

## 2022-02-09 RX ORDER — SUCRALFATE 1 G/1
1 TABLET ORAL
Status: DISCONTINUED | OUTPATIENT
Start: 2022-02-09 | End: 2022-02-14 | Stop reason: HOSPADM

## 2022-02-09 RX ORDER — SODIUM CHLORIDE 9 MG/ML
75 INJECTION, SOLUTION INTRAVENOUS CONTINUOUS
Status: DISCONTINUED | OUTPATIENT
Start: 2022-02-09 | End: 2022-02-10

## 2022-02-09 RX ORDER — INSULIN LISPRO 100 [IU]/ML
INJECTION, SOLUTION INTRAVENOUS; SUBCUTANEOUS
Status: DISCONTINUED | OUTPATIENT
Start: 2022-02-09 | End: 2022-02-14 | Stop reason: HOSPADM

## 2022-02-09 RX ORDER — ROSUVASTATIN CALCIUM 40 MG/1
40 TABLET, COATED ORAL
Status: DISCONTINUED | OUTPATIENT
Start: 2022-02-09 | End: 2022-02-14 | Stop reason: HOSPADM

## 2022-02-09 RX ORDER — AMLODIPINE BESYLATE 5 MG/1
5 TABLET ORAL DAILY
Status: DISCONTINUED | OUTPATIENT
Start: 2022-02-09 | End: 2022-02-14 | Stop reason: HOSPADM

## 2022-02-09 RX ORDER — HYDRALAZINE HYDROCHLORIDE 20 MG/ML
20 INJECTION INTRAMUSCULAR; INTRAVENOUS
Status: DISCONTINUED | OUTPATIENT
Start: 2022-02-09 | End: 2022-02-14 | Stop reason: HOSPADM

## 2022-02-09 RX ORDER — LIDOCAINE HYDROCHLORIDE 20 MG/ML
JELLY TOPICAL ONCE
Status: COMPLETED | OUTPATIENT
Start: 2022-02-09 | End: 2022-02-09

## 2022-02-09 RX ADMIN — SODIUM CHLORIDE 40 MG: 9 INJECTION INTRAMUSCULAR; INTRAVENOUS; SUBCUTANEOUS at 09:27

## 2022-02-09 RX ADMIN — POTASSIUM CHLORIDE, DEXTROSE MONOHYDRATE AND SODIUM CHLORIDE: 300; 5; 450 INJECTION, SOLUTION INTRAVENOUS at 06:39

## 2022-02-09 RX ADMIN — AMLODIPINE BESYLATE 5 MG: 5 TABLET ORAL at 13:58

## 2022-02-09 RX ADMIN — ONDANSETRON 4 MG: 2 INJECTION INTRAMUSCULAR; INTRAVENOUS at 07:51

## 2022-02-09 RX ADMIN — Medication 10 UNITS: at 12:32

## 2022-02-09 RX ADMIN — MORPHINE SULFATE 2 MG: 2 INJECTION, SOLUTION INTRAMUSCULAR; INTRAVENOUS at 01:29

## 2022-02-09 RX ADMIN — LIDOCAINE HYDROCHLORIDE: 20 JELLY TOPICAL at 16:12

## 2022-02-09 RX ADMIN — Medication 3 UNITS: at 22:56

## 2022-02-09 RX ADMIN — MORPHINE SULFATE 2 MG: 2 INJECTION, SOLUTION INTRAMUSCULAR; INTRAVENOUS at 05:46

## 2022-02-09 RX ADMIN — MORPHINE SULFATE 2 MG: 2 INJECTION, SOLUTION INTRAMUSCULAR; INTRAVENOUS at 09:31

## 2022-02-09 RX ADMIN — SODIUM CHLORIDE 21.6 UNITS/HR: 9 INJECTION, SOLUTION INTRAVENOUS at 03:52

## 2022-02-09 RX ADMIN — MORPHINE SULFATE 2 MG: 2 INJECTION, SOLUTION INTRAMUSCULAR; INTRAVENOUS at 14:35

## 2022-02-09 RX ADMIN — POTASSIUM CHLORIDE AND SODIUM CHLORIDE: 900; 300 INJECTION, SOLUTION INTRAVENOUS at 01:47

## 2022-02-09 RX ADMIN — SODIUM CHLORIDE 0.6 UNITS/HR: 9 INJECTION, SOLUTION INTRAVENOUS at 12:29

## 2022-02-09 RX ADMIN — SODIUM CHLORIDE 10.8 UNITS/HR: 9 INJECTION, SOLUTION INTRAVENOUS at 06:41

## 2022-02-09 RX ADMIN — SUCRALFATE 1 G: 1 TABLET ORAL at 18:19

## 2022-02-09 RX ADMIN — ONDANSETRON 4 MG: 2 INJECTION INTRAMUSCULAR; INTRAVENOUS at 22:44

## 2022-02-09 RX ADMIN — ONDANSETRON 4 MG: 2 INJECTION INTRAMUSCULAR; INTRAVENOUS at 14:45

## 2022-02-09 RX ADMIN — SODIUM CHLORIDE 40 MG: 9 INJECTION INTRAMUSCULAR; INTRAVENOUS; SUBCUTANEOUS at 22:39

## 2022-02-09 RX ADMIN — SODIUM CHLORIDE 40 MG: 9 INJECTION INTRAMUSCULAR; INTRAVENOUS; SUBCUTANEOUS at 00:05

## 2022-02-09 RX ADMIN — SODIUM CHLORIDE 75 ML/HR: 9 INJECTION, SOLUTION INTRAVENOUS at 20:01

## 2022-02-09 RX ADMIN — SUCRALFATE 1 G: 1 TABLET ORAL at 22:43

## 2022-02-09 RX ADMIN — Medication 10 UNITS: at 18:18

## 2022-02-09 RX ADMIN — MORPHINE SULFATE 2 MG: 2 INJECTION, SOLUTION INTRAMUSCULAR; INTRAVENOUS at 22:58

## 2022-02-09 RX ADMIN — ROSUVASTATIN CALCIUM 40 MG: 40 TABLET, FILM COATED ORAL at 01:37

## 2022-02-09 RX ADMIN — SODIUM CHLORIDE 29.6 UNITS/HR: 9 INJECTION, SOLUTION INTRAVENOUS at 01:45

## 2022-02-09 RX ADMIN — MORPHINE SULFATE 2 MG: 2 INJECTION, SOLUTION INTRAMUSCULAR; INTRAVENOUS at 19:58

## 2022-02-09 RX ADMIN — ROSUVASTATIN CALCIUM 40 MG: 40 TABLET, FILM COATED ORAL at 22:43

## 2022-02-09 RX ADMIN — SUCRALFATE 1 G: 1 TABLET ORAL at 12:32

## 2022-02-09 RX ADMIN — SODIUM CHLORIDE 0.9 UNITS/HR: 9 INJECTION, SOLUTION INTRAVENOUS at 11:17

## 2022-02-09 RX ADMIN — CEFTRIAXONE 1 G: 1 INJECTION, POWDER, FOR SOLUTION INTRAMUSCULAR; INTRAVENOUS at 01:33

## 2022-02-09 RX ADMIN — HYDRALAZINE HYDROCHLORIDE 20 MG: 20 INJECTION INTRAMUSCULAR; INTRAVENOUS at 19:55

## 2022-02-09 NOTE — ED NOTES
TRANSFER - OUT REPORT:    Verbal report given to 2nd floor RN(name) on Pauline Mendez  being transferred to @nd floor bed 2225(unit) for routine progression of care       Report consisted of patients Situation, Background, Assessment and   Recommendations(SBAR). Information from the following report(s) Kardex was reviewed with the receiving nurse. Lines:   Peripheral IV 02/08/22 Left Antecubital (Active)       Peripheral IV 02/08/22 Right Antecubital (Active)   Site Assessment Clean, dry, & intact 02/08/22 1933   Dressing Status Clean, dry, & intact 02/08/22 1933        Opportunity for questions and clarification was provided.       Patient transported with:   Monitor

## 2022-02-09 NOTE — DIABETES MGMT
[address] [facility] NURSE SPECIALIST CONSULT     Initial Presentation   Hope Krueger is a 46 y.o. male admitted 2/8/22 with weakness, SOB,  nausea and vomiting x 3 days. The patient became concerned when emesis became bloody. He called EMS. The reports last taking insulin 3 days ago and when monitoring BG's the metered just read \"HI\". Admission glucose 911. A1C 13 %  Anion Gap. Creatine 2.42. GFR  30. DKA. The patient was found to be Covid positive toady (2/9/22). HX:   Past Medical History:   Diagnosis Date    Bipolar 1 disorder, depressed ([address]     Bipolar disorder ([address]     Depression     Diabetes ([address]     DKA, type 1 ([address] 1/27/2013    diagnosed age 23    H/O noncompliance with medical treatment, presenting hazards to health     MRSA (methicillin resistant staph aureus) culture positive     MRSA (methicillin resistant Staphylococcus aureus)     Face    Noncompliance with medication regimen     Second hand smoke exposure     Seizure ([address]     Seizures ([address] 2006 or 2007    one episode during nursing home        INITIAL DX:   DKA (diabetic ketoacidosis) ([address] [E11.10]     Current Treatment     TX: ABx. Insulin. Crestor. GI prophylaxis. Consulted by Provider for advanced diabetes nursing assessment and care for:   [x] Transitioning off Tad Deis   [x] Inpatient management strategy  [] Home management assessment  [] Survival skill education    Hospital Course   Clinical progress has been complicated by DKA. Patient schedule for upper endoscopy today per GI    Diabetes History   The patient reports a 20 year history of Type 1 diabetes. He has a positive family hx of diabetes (mom & niece). He reports taking 30 units of Lantus daily and 15 ( sliding scale) units of Humalog with meals. He follows with a PCP,  Aylin Grimaldo MD, for diabetes management.       Diabetes-related Medical History  Acute complications  DKA  Neurological complications  Peripheral neuropathy  Microvascular disease  Retinopathy    Diabetes Medication History  Key Antihyperglycemic Medications             insulin aspart U-100 (NovoLOG Flexpen U-100 Insulin) 100 unit/mL (3 mL) inpn 8 Units by SubCUTAneous route Before breakfast, lunch, and dinner. (Novolog or Humalog, whichever more preferred: Inject 5 units with each meal + correction insulin, up to 30 units per day    insulin glargine (Lantus Solostar U-100 Insulin) 100 unit/mL (3 mL) inpn INJECT 30 UNITS SUBCUTANEOUSLY DAILY           Taking medications pattern  [x] Consistent administration  [x] Affordable  Social determinants of health impacting diabetes self-management practices   Concerned that you need to know more about how to stay healthy with diabetes  Overall evaluation:    [x] Not achieving A1c target with drug therapy & self-care practices    Subjective   I was throwing up blood     Objective   Physical exam  General Normal weight male. Conversant and cooperative  Neuro  Alert, oriented   Vital Signs   Visit Vitals  BP (!) 143/82   Pulse 86   Temp 97.4 °F (36.3 °C)   Resp 13   Ht 5' 9\" (1.753 m)   Wt 66.2 kg (146 lb)   SpO2 98%   BMI 21.56 kg/m²         Laboratory  Recent Labs     02/09/22  0837 02/09/22  0420 02/09/22  0256 02/08/22  2343 02/08/22  2238 02/08/22  2238 02/08/22  2034 02/08/22  1922   GLU 86 239*  --  639*   < > 760*   < > 869*   AGAP 5 14  --   --   --  27*   < > 29*   WBC  --   --  15.7*  --   --   --   --  11.4*   CREA 2.28* 2.61*  --   --   --  2.38*   < > 2.42*   GFRNA 32* 27*  --   --   --  31*   < > 30*   AST  --   --   --   --   --   --   --  7*   ALT  --   --   --   --   --   --   --  15    < > = values in this interval not displayed.        Factors impacting BG management  Factor Dose Comments   Nutrition:  Standard meals     NPO    nausea   Infection Rocephin 1 g q 24 hrs    Other:   Kidney function  Liver function      GFR 30  Ast 60     Blood glucose pattern      Significant diabetes-related events over the past 24-72 hours  The patient remains on glucostabilizer. Anion Gap is closed x 2. His current BG is 105. Tested positive for Covid today. Assessment and Plan   Nursing Diagnosis Risk for unstable blood glucose pattern   Nursing Intervention Domain 5258 Decision-making Support   Nursing Interventions Examined current inpatient diabetes/blood glucose control   Explored factors facilitating and impeding inpatient management  Explored corrective strategies with patient and responsible inpatient provider   Informed patient of rational for insulin strategy while hospitalized     Nursing Diagnosis 59477 Ineffective Health Management   Nursing Intervention Domain 5254 Decision-makingSupport   Nursing Interventions Identified diabetes self-management practices impeding diabetes control  Discussed diabetes survival skills related to  1. Healthy Plate eating plan; given handouts  2. Role of physical activity in improving insulin sensitivity and action  3. Procedure for blood glucose monitoring & options for low-cost products available from [facility]   4. Medications plan at discharge     Evaluation   This 46year old ,  male patient with Type 1 diabetes did not achieve BG control prior to admission as evidenced by A1C of 13%. He has a hx of DKA. The patient on gluco stabilizer yesterday evening. He has required between 1 - 30 units of insulin per hour since starting glucostabilizer. The patient was taking 30 units Lantus daily and up to 15 units of Humalog with meals PTA. He reports stable BG's at home until 3 days ago, however the A1C suggests BG's have been unstable. The aptient has a hx of A1c's above goal.  I recommend transitioning off Gluco stabilzer with 20 units of Lantus ( .3 x 66.2kg). I would like to monitor BG's trends this evening and make recommendation on basal dose and mealtime dose if the patient is eating in the morning.       This patient would benefit from diabetes self-management education and support [facility]) after discharge. Recommendations     [] Use of Subcutaneous Insulin Order set (1856)  Insulin Dosing Specific recommendation   Basal                                      (Based on weight, BMI & GFR) []        0.2 units/kg/D  [x] 0.3 units/kg/D  [] 0.4 units/kg/D   Transition dose 20 Lantus   Nutritional                                      (Based on CHO/dextrose load) [] Normal sensitivity  [] Insulin-resistant sensitivity    Corrective                                       (Useful in adjusting insulin dosing) [x] Normal sensitivity  [] HIGH sensitivity  [] Insulin-resistant sensitivity        [x] Referral to  [x] [facility] (Phone [phone] to schedule appointment) for [facility]    Billing Code(s)   [x] 26717 IP subsequent hospital care - 35 minutes [] 34347 Prolonged Services - 65 minutes [] 53331 Prolonged Services - 110 minutes  [] 53583 IP subsequent hospital care - 25 minutes [] 90704 Prolonged Services - 55 minutes [] 72054 Prolonged Services - 100 minutes  [] 66800 IP subsequent hospital care - 15 minutes [] 30031 Prolonged Services - 45 minutes [] 88624 Prolonged Services - 90 minutes    Before making these care recommendations, I personally reviewed the hospitalization record, including notes, laboratory & diagnostic data and current medications, and examined the patient at the bedside (circumstances permitting) before making care recommendations. More than fifty (50) percent of the time was spent in patient counseling and/or care coordination.   Total minutes: 175 Joaquin Prater, CNS  Diabetes Clinical Nurse Specialist  [facility]  Access via [employer]

## 2022-02-09 NOTE — CONSULTS
Gastroenterology Consultation Note  MANUEL Olvera   for Dr. Kikr Mcbride    NAME: Naomie Hallman : 1980 MRN: [id number]   ATTG: Dr. Eric Marie PCP: Althea Avila MD  Date/Time:  2022 9:06 AM  Subjective:   REASON FOR CONSULT:      Naomie Hallman is a 46 y.o.  male who I was asked to see for DKA with N/V and hematemesis. Previous history of the same back in  and seen at [facility], PMHx of Type 1 DM, and bipolar disorder. He reports he was seen at [facility] ER on , BG was elevated in 400s but no DKA. Given insulin and d/herminia home. He reports worsening N/V that started out as non bloody and non bilious. Admits to epigastric pain as well. Then yesterday started with coffee ground emesis. No BRB in emesis. Had one dark tarry stool yesterday as well. No further hematemesis or melena sine arrival.  He denies any NSAID use. No tobacco, EtOH or illicit substance use either. He reports he was taking DM meds as prescribed. No recent illness and reports negative COVID test on  at [facility]. May be vaccinated but not boosted. Has been NPO. Hgb 12>9>11.1 today, WBC 11.4>15.7, plt 474, Anion gap closed today, CO2 from 7>18 today, BUN up to 43, Cr 2.61, BG was initially 911, now down to 86 today    EGD  with small ulcer distal esophagus  EGD  grade C esophagitis, erosions in stomach  Normal 2 hour GES 21    No family hx of colon, stomach or esophageal cancer.    Past Medical History:   Diagnosis Date    Bipolar 1 disorder, depressed ([facility]     Bipolar disorder ([facility]     Depression     Diabetes ([facility]     DKA, type 1 ([facility] 2013    diagnosed age 23    H/O noncompliance with medical treatment, presenting hazards to health     MRSA (methicillin resistant staph aureus) culture positive     MRSA (methicillin resistant Staphylococcus aureus)     Face    Noncompliance with medication regimen     Second hand smoke exposure     Seizure ([facility]  Seizures ([address] 2006 or 2007    one episode during prison      Past Surgical History:   Procedure Laterality Date    HX HEENT      top left wisdom tooth    HX ORTHOPAEDIC Left     wrist; [facility]    UPPER GI ENDOSCOPY,BIOPSY  2018          Social History     Tobacco Use    Smoking status: Former Smoker     Packs/day: 0.10     Years: 16.00     Pack years: 1.60     Types: Cigarettes     Start date: 10/4/1999     Quit date: 2020     Years since quittin.9    Smokeless tobacco: Never Used   Substance Use Topics    Alcohol use: No      Family History   Problem Relation Age of Onset    Diabetes Mother     Diabetes Other         neice, type 1       No Known Allergies   Home Medications:  Prior to Admission Medications   Prescriptions Last Dose Informant Patient Reported? Taking? DULoxetine (CYMBALTA) 60 mg capsule   No No   Sig: Take 1 Capsule by mouth daily. ergocalciferol (ERGOCALCIFEROL) 1,250 mcg (50,000 unit) capsule   No No   Sig: Take 1 capsule by mouth once a week   insulin aspart U-100 (NovoLOG Flexpen U-100 Insulin) 100 unit/mL (3 mL) inpn   No No   Si Units by SubCUTAneous route Before breakfast, lunch, and dinner. (Novolog or Humalog, whichever more preferred: Inject 5 units with each meal + correction insulin, up to 30 units per day   insulin glargine (Lantus Solostar U-100 Insulin) 100 unit/mL (3 mL) inpn   No No   Sig: INJECT 30 UNITS SUBCUTANEOUSLY DAILY   lisinopriL (PRINIVIL, ZESTRIL) 20 mg tablet  Other No No   Sig: Take 1 Tablet by mouth daily. ondansetron (ZOFRAN ODT) 4 mg disintegrating tablet   No No   Sig: Take 1 Tablet by mouth every eight (8) hours as needed for Nausea or Vomiting. pantoprazole (Protonix) 40 mg tablet   No No   Sig: Take 1 Tablet by mouth daily. pregabalin (Lyrica) 75 mg capsule  Other Yes No   Sig: Take 75 mg by mouth two (2) times a day. rosuvastatin (CRESTOR) 40 mg tablet   No No   Sig: Take 1 Tablet by mouth nightly. Facility-Administered Medications: None     Hospital medications:  Current Facility-Administered Medications   Medication Dose Route Frequency    DULoxetine (CYMBALTA) capsule 60 mg  60 mg Oral DAILY    rosuvastatin (CRESTOR) tablet 40 mg  40 mg Oral QHS    cefTRIAXone (ROCEPHIN) 1 g in 0.9% sodium chloride (MBP/ADV) 50 mL MBP  1 g IntraVENous Q24H    dextrose 5% - 0.45% NaCl with KCl 40 mEq/L infusion   IntraVENous CONTINUOUS    insulin regular (NOVOLIN R, HUMULIN R) 100 Units in 0.9% sodium chloride 100 mL infusion  0-50 Units/hr IntraVENous TITRATE    insulin lispro (HUMALOG) injection   SubCUTAneous TIDAC    glucose chewable tablet 16 g  4 Tablet Oral PRN    dextrose 10% infusion  150-250 mL/hr IntraVENous PRN    glucagon (GLUCAGEN) injection 1 mg  1 mg IntraMUSCular PRN    acetaminophen (TYLENOL) tablet 650 mg  650 mg Oral Q6H PRN    pantoprazole (PROTONIX) 40 mg in 0.9% sodium chloride 10 mL injection  40 mg IntraVENous Q12H    morphine injection 2 mg  2 mg IntraVENous Q3H PRN    bisacodyL (DULCOLAX) tablet 5 mg  5 mg Oral DAILY PRN    promethazine (PHENERGAN) tablet 12.5 mg  12.5 mg Oral Q6H PRN    Or    ondansetron (ZOFRAN) injection 4 mg  4 mg IntraVENous Q6H PRN     REVIEW OF SYSTEMS:     []     Unable to obtain  ROS due to  []    mental status change  []    sedated   []    intubated  Review of Systems -   History obtained from the patient  General ROS: negative for - chills or fever  Psychological ROS: positive for - behavioral disorder  Hematological and Lymphatic ROS: negative for - blood transfusions  Respiratory ROS: no cough, shortness of breath, or wheezing  Cardiovascular ROS: no chest pain or dyspnea on exertion  Gastrointestinal ROS: See HPI  Genito-Urinary ROS: no dysuria, trouble voiding, or hematuria  Musculoskeletal ROS: negative for - muscle pain  Neurological ROS: no TIA or stroke symptoms  Dermatological ROS: negative for - rash    Objective:   VITALS:    Visit Vitals  BP (!) 143/82   Pulse 86   Temp 97.4 °F (36.3 °C)   Resp 13   Ht 5' 9\" (1.753 m)   Wt 66.2 kg (146 lb)   SpO2 98%   BMI 21.56 kg/m²     Temp (24hrs), Av.7 °F (36.5 °C), Min:97.4 °F (36.3 °C), Max:98.2 °F (36.8 °C)    PHYSICAL EXAM:   General:    Alert, somewhat cooperative WM, no distress, appears stated age. Head:   Normocephalic, without obvious abnormality, atraumatic. Eyes:   Conjunctivae clear, anicteric sclerae. Pupils are equal  Nose:  Nares normal. No drainage or sinus tenderness. Throat:    Lips, mucosa, and tongue normal.  No Thrush  Neck:  Supple, symmetrical,  no adenopathy, thyroid: non tender  Back:    Symmetric,  No CVA tenderness. Lungs:   CTA bilaterally. No wheezing/rhonchi/rales. Heart:   Regular rate and rhythm,  no murmur, rub or gallop. Abdomen:   Soft, non-tender. Not distended. Bowel sounds normal. No masses. No hepatosplenomegaly. No shifting dullness. Rectal:  Deferred  Extremities: No cyanosis. No edema. No clubbing  Skin:     Texture, turgor normal. No rashes/lesions/jaundice. multiple tattoos. Lymph: Cervical, supraclavicular normal.  Psych:  Poor insight. Not depressed. Not anxious or agitated. Neurologic: EOMs intact. No facial asymmetry. No aphasia or slurred speech normal strength, A/O X 3. LAB DATA REVIEWED:    Lab Results   Component Value Date/Time    WBC 15.7 (H) 2022 02:56 AM    Hemoglobin (POC) 18.4 (H) 2017 09:00 AM    HGB 11.1 (L) 2022 02:56 AM    Hematocrit (POC) 41 10/22/2020 01:07 PM    HCT 34.1 (L) 2022 02:56 AM    PLATELET 935 (H)  02:56 AM    MCV 91.9 2022 02:56 AM     Lab Results   Component Value Date/Time    ALT (SGPT) 15 2022 07:22 PM    Alk.  phosphatase 228 (H) 2022 07:22 PM    Bilirubin, direct 0.1 2019 02:03 AM    Bilirubin, total 0.5 2022 07:22 PM     Lab Results   Component Value Date/Time    Sodium 144 2022 04:20 AM    Potassium 3.8 2022 04:20 AM    Chloride 112 (H) 02/09/2022 04:20 AM    CO2 18 (L) 02/09/2022 04:20 AM    Anion gap 14 02/09/2022 04:20 AM    Glucose 239 (H) 02/09/2022 04:20 AM    BUN 43 (H) 02/09/2022 04:20 AM    Creatinine 2.61 (H) 02/09/2022 04:20 AM    BUN/Creatinine ratio 16 02/09/2022 04:20 AM    GFR est AA 33 (L) 02/09/2022 04:20 AM    GFR est non-AA 27 (L) 02/09/2022 04:20 AM    Calcium 8.4 (L) 02/09/2022 04:20 AM     Lab Results   Component Value Date/Time    Lipase 39 (L) 02/08/2022 07:22 PM     Lab Results   Component Value Date/Time    INR 1.0 02/08/2022 07:22 PM    INR 1.1 10/23/2020 02:07 AM    INR 1.0 01/02/2020 05:38 PM    Prothrombin time 10.7 02/08/2022 07:22 PM    Prothrombin time 11.6 (H) 10/23/2020 02:07 AM    Prothrombin time 9.8 01/02/2020 05:38 PM       XR Results (most recent):  Results from Hospital Encounter encounter on 02/08/22    XR CHEST PORT    Narrative  EXAM: XR CHEST PORT    INDICATION: hematemesis    COMPARISON: 12/2/2021    FINDINGS: A portable AP radiograph of the chest was obtained at 1941 hours. The  patient is on a cardiac monitor. The lungs are clear. The cardiac and  mediastinal contours and pulmonary vascularity are normal.  The bones and soft  tissues are grossly within normal limits. Impression  No acute process identified by portable radiography        Impression:  Active Problems:    DKA (diabetic ketoacidosis) ([address] (12/2/2021)      Coffee ground emesis (2/9/2022)      COVID-19 (2/9/2022)       Plan:  Patient with coffee ground emesis following multiple episodes of N/V likely secondary to DKA vs diabetic gastroparesis due to elevated BG. Hgb decreased to 11.1 from 12 yesterday and BP stable. Continue to monitor daily Hgb. Rapid COVID here is positive. Recommend treating supportively with BID IV PPI and Carafate. Further COVID treatment per primary care team.  Bleeding may be due to mague Estevez tear, ulcerative esophagitis or PUD.    Hold off on EGD at this time unless he is hemodynamically unstable. Once he is out of quarantine we can plan for O/P EGD.   Needs better management of DM given A1C of 13.          ___________________________________________________  Care Plan discussed with:    [x]    Patient   []    Family   [x]    Nursing   []    Attending  Total Time : 30  minutes   ___________________________________________________  GI: MANUEL Moses

## 2022-02-09 NOTE — PROGRESS NOTES
9:30 AM Patient did vomit once and it was medium sized, brown, coffee ground emesis. 10:00 AM Rapid test came back positive for COVID. MD notified. GI not doing EGD today as he is positive now. 1:30 PM Troponin 154. MD notified. Order received for EKG and another troponin draw at 1800.    4:47 PM Patient was retaining urine, 999 +ml on bladder scan, verbal order received for straight cath. However, there was resistance and patient said that he has enlarged prostate so order received to insert  Beard with coude cath. Ghulam Bence was applied before inserting the coude. Patient hd 900ml urine output instantly. Patient denies any pain or discomfort and resting quietly.

## 2022-02-09 NOTE — PROGRESS NOTES
Received message from patient's nurse stating:    Pt's blood sugar is now 195 on glucostabilizer. Pt has normal saline with 40 meq of KCL at 200 ml/hr. Discussion / orders:    Changed IVF to D5 1/2NS with 40 meq KCL at the current rate. Please note that this note was dictated using Dragon computer voice recognition software. Quite often unanticipated grammatical, syntax, homophones, and other interpretive errors are inadvertently transcribed by the computer software. Please disregard these errors. Please excuse any errors that have escaped final proofreading.      Signed by:  Aby Javed DNP, ACNP-BC

## 2022-02-09 NOTE — PROGRESS NOTES
Problem: Falls - Risk of  Goal: *Absence of Falls  Description: Document Clearence Skates Fall Risk and appropriate interventions in the flowsheet.   Outcome: Progressing Towards Goal  Note: Fall Risk Interventions:

## 2022-02-09 NOTE — PROGRESS NOTES
1900 TRANSFER - IN REPORT:    Verbal report received from ASAD Tucker(name) on Johann Ra  being received from ER(unit) for routine progression of care      Report consisted of patients Situation, Background, Assessment and   Recommendations(SBAR). Information from the following report(s) SBAR was reviewed with the receiving nurse. Opportunity for questions and clarification was provided. Assessment completed upon patients arrival to unit and care assumed. Pt has orders for insulin drip. Has not been started yet. Nurse Rancho Duggan RN states he will try to start insulin gtt before patient comes to floor.

## 2022-02-09 NOTE — PROGRESS NOTES
Transition of Care Plan:    RUR: 21% - high   Disposition: Home with family assistance   Follow up appointments: PCP and specialist as indicated   DME needed: TBD  Transportation at Discharge: Mother vs medicaid transport   [address] or means to access home: Pt's mother has      IM Medicare Letter: n/a - medicaid   Is patient a BCPI-A Bundle: If yes, was Bundle Letter given?:    Is patient a  and connected with the [address] [employer] [employer] ? No                If yes, was [employer] transfer form completed and [facility] notified? Caregiver Contact: Mother - Yadira Mason - [phone] (phone number is out of service) OR Friend - Jimena Ovalle - [phone] Marilu Morales is pt's child's mother - was not aware pt was in hospital, could not verify pt's mother's phone number). Discharge Caregiver contacted prior to discharge? To be contacted  Care Conference needed?: Not indicated at this time     Reason for Admission: DKI                 RUR Score: 21% - high       PCP: First and Last name:  Poornima Guajardo MD     Name of Practice:   Are you a current patient: Yes/No: Pt's friend unable to confirm    Approximate date of last visit:    Can you do a virtual visit with your PCP:              Resources/supports as identified by patient/family:   Per pt's friend Giovanny Brooks, pt's mother is primary social support. Top Challenges facing patient (as identified by patient/family and CM): Finances/Medication cost?  No concerns reported, pt has Medicaid. Transportation? Pt's friend Giovanny Brooks unable to report. Support system or lack thereof? Per pt's friend Giovanny Brooks, pt's mother is primary social support. Living arrangements? Pt resides with mother. Self-care/ADLs/Cognition? Pt's friend Giovanny Brooks unable to report.             Current Advanced Directive/Advance Care Plan:  Full Code      Healthcare Decision Maker:   Click here to complete HealthCare Decision Makers including selection of the Healthcare Decision Maker Relationship (ie \"Primary\")      Primary Decision MakeGerard Barcenas - Mother - [phone]    Payor Source Payor: [facility] / Plan: [address] / Product Type: Managed Care Medicaid /                             Plan for utilizing home health:  None at this time. Transition of Care Plan: Home with family assistance. Chart reviewed. Due to contact precautions, CM attempted to phone pt on room phone and personal phone [phone] to complete assessment - no answer. CM attempted to phone pt's mother Christian Merlos [phone] - phone number was not in service. CM phoned pt's secondary emergency contact Candido Ballard [phone] - Pt admitted with DKA. Verified some contact information, demographics and insurance information. Prior to admission, pt resides with mother. No HHC, SNF/rehab history reported. Current plan is home with family assistance. Pt's friend Dusty Tavarez was not aware that pt was in hospital and was not able answer all assessment questions. Unit CM informed that various pieces of assessment information were not obtained. Unit CM will continue to follow for dcp.      Care Management Interventions  PCP Verified by CM: No  Palliative Care Criteria Met (RRAT>21 & CHF Dx)?: No  Mode of Transport at Discharge:  (Mother vs Medicaid transport)  Transition of Care Consult (CM Consult): Discharge Planning  Discharge Durable Medical Equipment: No  Physical Therapy Consult: No  Occupational Therapy Consult: No  Speech Therapy Consult: No  Support Systems: Parent(s),Friend/Neighbor  Confirm Follow Up Transport: Other (see comment) (TBD)  The Patient and/or Patient Representative was Provided with a Choice of Provider and Agrees with the Discharge Plan?: Yes  Freedom of Choice List was Provided with Basic Dialogue that Supports the Patient's Individualized Plan of Care/Goals, Treatment Preferences and Shares the Quality Data Associated with the Providers?: No   Resource Information Provided?: No  Discharge Location  Patient Expects to be Discharged to[de-identified] Home with family assistance    Michael Gillespie, [address] [place] [address], [id number] [facility]  [phone]

## 2022-02-09 NOTE — H&P
Hospitalist Admission Note    NAME:  Marlys Granados   :   1980   MRN:   [id number]     Date of admit: 2022    PCP: Yumi Holly MD    Assessment/Plan:     Diabetes mellitus type 1 with DKA POA  Home regimen: lantus  , humalog  Last admit  to 2021 with DKA  Presents with N/V, abdominal pain   Denies insulin non compliance   with HCO3 11, AG 29, venous pH 7.25 with pCO2 of 21  Admit to stepdown  2 liters NS fluid bolus  Start IVF with KCL  NPO till N/V improved  Check HgBa1C  Transition to SQ insulin once AG closed  Serial labs    Acute kidney injury POA BUN/creat 43 and 2.42  Baseline creatinine appears to be about 1.2  Suspect hypovolemic from osmotic diuresis  IV fluid boluses and IV fluids overnight  Serial labs    Possible UTI POA  UA > 100 WBC, 0-5 RBC, 4+ bacteria  Urine culture in lab  IV ceftriaxone    Nausea/vomiting with abdominal pain POA  ? GI bleed with reported hematemesis POA  Suspect N/V  related to DKA  Pt reports onset of vomiting then 2 episodes of red blood   Sounds clinically like mague-davidson tear  No further bleeding or vomiting since arriving on floor  No melena or bright red blood per rectum  Hemoglobin stable at 12.0  IV PPI  Serial HgBs, check type and screen  GI consult in AM  NPO  PRN nausea meds    Essential HTN POA  Hyperlipidemia POA  Continue statin  Hold home BP meds with FELECIA, possible bleeding  PRN hydralazine     Body mass index is 21.56 kg/m². Given the patient's current clinical presentation, I have a high level of concern for decompensation if discharged from the emergency department. My assessment of this patient's clinical condition and my plan of care is as noted above.     DVT prophylaxis with SCDs till GI bleed ruled out    Code status: Full code  NOK: mother    History     CHIEF COMPLAINT: \"I threw up some blood today\"    HISTORY OF PRESENT ILLNESS:    70-year-old male    Vaccinated against COVID-19 x2, denies receiving a booster   Recent COVID-19 PCR negative on 2/6/2022 at [facility] ED    Known type I diabetic with recurrent DKA in the past, last DKA admission was early December 2021    Past several months he has had intermittent suprapubic discomfort and intermittent nausea vomiting that typically last 1 to 2 days. ? Gastroparesis    CT abdomen/pelvis at [address] [place] [address] as outpatient 1/24/2022         1.  No evidence of renal or ureteral calculi or filling defect within the collecting system. 2.  Diffuse circumferential urinary bladder wall thickening and mucosal hyperemia as well as multiple gas locules within the bladder lumen  concerning for cystitis. 3.  Nonspecific prominent retroperitoneal lymph nodes, likely reactive, attention on follow-up.       Seen at [facility] ED 2/6/2022 with abdominal pain, N/V   Not in DKA, but BS elevated to 400, given insulin   COVID-19 PCR was negative   No UA sent   Creatinine 1.45   Given insulin and treated symptomatically, discharged home    Back with persistent nausea vomiting, moderate periumbilical and suprapubic abdominal pain  Positive cough for several days  Multiple episodes of vomiting, one episode at home was bloody   Bloody emesis started only after multiple prior nonbloody episodes  No shortness of breath, chest pain  No headache, fevers, sore throat  No melena or bright red blood per rectum  Because of the worsening abdominal pain, nausea vomiting, lethargy and the bloody emesis  He came into the emergency room    Emergency room  Initially vomiting, 1 episode of hematemesis  Subsequent vomiting and hematemesis have resolved  No melena or bright red blood per rectum   with HCO3 11, AG 29, venous pH 7.25 with pCO2 of 21  Chest x-ray with no airspace disease  New acute kidney injury with BUN 43 and creatinine 2.42  UA with greater than 100 WBCs, 4+ bacteria  Hemoglobin initially 12.0  Received a liter normal saline, started on IV insulin gtt  We were called to admit the patient      Past Medical History:   Diagnosis Date    [facility] disorder[facility] depressed [facility]     Bipolar disorder ([facility]     Depression     Diabetes ([facility]     DKA, type 1 ([facility] 2013    diagnosed age 23    H/O noncompliance with medical treatment, presenting hazards to health     MRSA (methicillin resistant staph aureus) culture positive     MRSA (methicillin resistant Staphylococcus aureus)     Face    Noncompliance with medication regimen     Second hand smoke exposure     Seizure ([facility]     Seizures ([facility] 2006 or     one episode during prison        Past Surgical History:   Procedure Laterality Date    HX HEENT      top left wisdom tooth    HX ORTHOPAEDIC Left     wrist; MCV    UPPER GI ENDOSCOPY,BIOPSY  2018            Social History     Tobacco Use    Smoking status: Former Smoker     Packs/day: 0.10     Years: 16.00     Pack years: 1.60     Types: Cigarettes     Start date: 10/4/1999     Quit date: 2020     Years since quittin.9    Smokeless tobacco: Never Used   Substance Use Topics    Alcohol use: No        Family History   Problem Relation Age of Onset    Diabetes Mother     Diabetes Other         neice, type 1         No Known Allergies     Prior to Admission medications    Medication Sig Start Date End Date Taking? Authorizing Provider   rosuvastatin (CRESTOR) 40 mg tablet Take 1 Tablet by mouth nightly. 22   Poornima Guajardo MD   ondansetron (ZOFRAN ODT) 4 mg disintegrating tablet Take 1 Tablet by mouth every eight (8) hours as needed for Nausea or Vomiting. 21   Renzo Stevens MD   DULoxetine (CYMBALTA) 60 mg capsule Take 1 Capsule by mouth daily. 21   Poornima Guajardo MD   pantoprazole (Protonix) 40 mg tablet Take 1 Tablet by mouth daily. 21   Julianna Sepulveda MD   insulin aspart U-100 (NovoLOG Flexpen U-100 Insulin) 100 unit/mL (3 mL) inpn 8 Units by SubCUTAneous route Before breakfast, lunch, and dinner.  (Novolog or Humalog, whichever more preferred: Inject 5 units with each meal + correction insulin, up to 30 units per day 21   Jb Wiley MD   insulin glargine (Lantus Solostar U-100 Insulin) 100 unit/mL (3 mL) inpn INJECT 30 UNITS SUBCUTANEOUSLY DAILY 11/15/21   Yumi Holly MD   ergocalciferol (ERGOCALCIFEROL) 1,250 mcg (50,000 unit) capsule Take 1 capsule by mouth once a week 10/30/21   Yumi Holly MD   pregabalin (Lyrica) 75 mg capsule Take 75 mg by mouth two (2) times a day. Provider, Historical   lisinopriL (PRINIVIL, ZESTRIL) 20 mg tablet Take 1 Tablet by mouth daily.  21   Yumi Holly MD       Review of symptoms:     POSITIVE= Bold  Negative = not bold  General:  fever, chills, sweats, generalized weakness, weight loss     loss of appetite   Eyes:    blurred vision, eye pain, double vision  ENT:    Coryza, sore throat, trouble swallowing  Respiratory:   cough, sputum, SOB  Cardiology:   chest pain, orthopnea, PND, edema  Gastrointestinal:  abdominal pain , N/V, diarrhea, constipation, melena or BRBPR     hematemesis  Genitourinary:  Urgency, dysuria, hematuria  Muskuloskeletal :  Joint redness, swelling or acute joint pain, myalgias  Hematology:  easy bruising, nose or gum bleeding  Dermatological: rash, ulceration  Endocrine:   Polyuria or polydipsia, heat or hold intolerance  Neurological:  Headache, focal motor or sensory changes     Speech difficulties, memory loss  Psychological: depression, agitation      Objective:   VITALS:    Patient Vitals for the past 24 hrs:   Temp Pulse Resp BP SpO2   22 1834 97.7 °F (36.5 °C) 100 28 (!) 175/24 100 %     Temp (24hrs), Av.7 °F (36.5 °C), Min:97.7 °F (36.5 °C), Max:97.7 °F (36.5 °C)      O2 Device: None (Room air)    Wt Readings from Last 12 Encounters:   22 66.2 kg (146 lb)   22 64.8 kg (142 lb 13.7 oz)   21 70.8 kg (156 lb)   21 69 kg (152 lb 3.2 oz)   21 69.2 kg (152 lb 8.9 oz)   11/15/21 70.7 kg (155 lb 12.8 oz)   10/12/21 68.9 kg (152 lb)   10/07/21 68.9 kg (152 lb)   10/04/21 69.3 kg (152 lb 12.8 oz)   09/25/21 70.3 kg (155 lb)   07/14/21 66 kg (145 lb 9.6 oz)   07/06/21 67.2 kg (148 lb 2.4 oz)         PHYSICAL EXAM:     I had a face to face encounter and independently examined this patient on 02/08/22  as outlined below:    General:    Lethargic but arousable, cooperative in no distress, not vomiting when I saw   HEENT: Normocephalic, atraumatic    PERRL,  Sclera no icterus    Nasal mucosa without masses or discharge  Hearing intact to voice    Oropharynx without erythema or exudate    Neck:  No meningismus, trachea midline, no carotid bruits     Thyroid not enlarged, no nodules or tenderness  Lungs:   Clear to auscultation bilaterally. No wheezing or rales    No accessory muscle use or retractions. Heart:   Regular rate and rhythm,  no murmur or gallop. No LE edema  Abdomen:   Soft, non-tender. Not distended. Bowel sounds normal.     No masses, No Hepatosplenomegaly, No Rebound or guarding  Lymph nodes: No cervical or inguinal JACI  Musculoskeletal:  No Joint swelling, erythema, warmth.      No Cyanosis or clubbing  Skin:      No rashes     Not Jaundiced   No nodules or thickening  Neurologic: Lethargic but arousable follows commands     Cranial nerves 2 to 12 intact    Symmetric motor strength bilaterally       LAB DATA REVIEWED:    Recent Results (from the past 12 hour(s))   METABOLIC PANEL, COMPREHENSIVE    Collection Time: 02/08/22  7:22 PM   Result Value Ref Range    Sodium 128 (L) 136 - 145 mmol/L    Potassium 5.0 3.5 - 5.1 mmol/L    Chloride 88 (L) 97 - 108 mmol/L    CO2 11 (LL) 21 - 32 mmol/L    Anion gap 29 (H) 5 - 15 mmol/L    Glucose 869 (HH) 65 - 100 mg/dL    BUN 43 (H) 6 - 20 MG/DL    Creatinine 2.42 (H) 0.70 - 1.30 MG/DL    BUN/Creatinine ratio 18 12 - 20      GFR est AA 36 (L) >60 ml/min/1.73m2    GFR est non-AA 30 (L) >60 ml/min/1.73m2    Calcium 9.4 8.5 - 10.1 MG/DL Bilirubin, total 0.5 0.2 - 1.0 MG/DL    ALT (SGPT) 15 12 - 78 U/L    AST (SGOT) 7 (L) 15 - 37 U/L    Alk. phosphatase 228 (H) 45 - 117 U/L    Protein, total 6.5 6.4 - 8.2 g/dL    Albumin 1.5 (L) 3.5 - 5.0 g/dL    Globulin 5.0 (H) 2.0 - 4.0 g/dL    A-G Ratio 0.3 (L) 1.1 - 2.2     CBC WITH AUTOMATED DIFF    Collection Time: 02/08/22  7:22 PM   Result Value Ref Range    WBC 11.4 (H) 4.1 - 11.1 K/uL    RBC 4.01 (L) 4.10 - 5.70 M/uL    HGB 12.0 (L) 12.1 - 17.0 g/dL    HCT 37.3 36.6 - 50.3 %    MCV 93.0 80.0 - 99.0 FL    MCH 29.9 26.0 - 34.0 PG    MCHC 32.2 30.0 - 36.5 g/dL    RDW 13.9 11.5 - 14.5 %    PLATELET 526 (H) 759 - 400 K/uL    MPV 10.2 8.9 - 12.9 FL    NRBC 0.0 0  WBC    ABSOLUTE NRBC 0.00 0.00 - 0.01 K/uL    NEUTROPHILS 89 (H) 32 - 75 %    LYMPHOCYTES 6 (L) 12 - 49 %    MONOCYTES 3 (L) 5 - 13 %    EOSINOPHILS 0 0 - 7 %    BASOPHILS 1 0 - 1 %    IMMATURE GRANULOCYTES 1 (H) 0.0 - 0.5 %    ABS. NEUTROPHILS 10.2 (H) 1.8 - 8.0 K/UL    ABS. LYMPHOCYTES 0.7 (L) 0.8 - 3.5 K/UL    ABS. MONOCYTES 0.3 0.0 - 1.0 K/UL    ABS. EOSINOPHILS 0.0 0.0 - 0.4 K/UL    ABS. BASOPHILS 0.1 0.0 - 0.1 K/UL    ABS. IMM.  GRANS. 0.1 (H) 0.00 - 0.04 K/UL    DF SMEAR SCANNED      RBC COMMENTS NORMOCYTIC, NORMOCHROMIC     TYPE & SCREEN    Collection Time: 02/08/22  7:22 PM   Result Value Ref Range    Crossmatch Expiration 02/11/2022,2359     ABO/Rh(D) A POSITIVE     Antibody screen NEG    PROTHROMBIN TIME + INR    Collection Time: 02/08/22  7:22 PM   Result Value Ref Range    INR 1.0 0.9 - 1.1      Prothrombin time 10.7 9.0 - 11.1 sec   LACTIC ACID    Collection Time: 02/08/22  7:22 PM   Result Value Ref Range    Lactic acid 2.5 (HH) 0.4 - 2.0 MMOL/L   LIPASE    Collection Time: 02/08/22  7:22 PM   Result Value Ref Range    Lipase 39 (L) 73 - 393 U/L   GLUCOSE, POC    Collection Time: 02/08/22  7:23 PM   Result Value Ref Range    Glucose (POC) >600 (HH) 65 - 117 mg/dL    Performed by Alejandrina Nowak RN    URINALYSIS W/ RFLX MICROSCOPIC    Collection Time: 02/08/22  7:56 PM   Result Value Ref Range    Color YELLOW/STRAW      Appearance TURBID (A) CLEAR      Specific gravity 1.024 1.003 - 1.030      pH (UA) 5.5 5.0 - 8.0      Protein 300 (A) NEG mg/dL    Glucose >1,000 (A) NEG mg/dL    Ketone 80 (A) NEG mg/dL    Bilirubin Negative NEG      Blood SMALL (A) NEG      Urobilinogen 0.2 0.2 - 1.0 EU/dL    Nitrites Negative NEG      Leukocyte Esterase MODERATE (A) NEG      WBC >100 (H) 0 - 4 /hpf    RBC 0-5 0 - 5 /hpf    Epithelial cells FEW FEW /lpf    Bacteria 4+ (A) NEG /hpf    Hyaline cast 0-2 0 - 5 /lpf   BLOOD GAS,CHEM8,LACTIC ACID POC    Collection Time: 02/08/22  8:15 PM   Result Value Ref Range    Calcium, ionized (POC) 1.11 (L) 1.12 - 1.32 mmol/L    BICARBONATE 9 mmol/L    Base deficit (POC) 16.3 mmol/L    Sample source VENOUS BLOOD      CO2, POC 10 (L) 19 - 24 MMOL/L    Sodium,  (L) 136 - 145 MMOL/L    Potassium, POC 4.6 3.5 - 5.5 MMOL/L    Chloride, POC 99 (L) 100 - 108 MMOL/L    Glucose, POC >700 (HH) 74 - 106 MG/DL    Creatinine, POC 2.1 (H) 0.6 - 1.3 MG/DL    Lactic Acid (POC) 3.77 (HH) 0.40 - 2.00 mmol/L    pH, venous (POC) 7.25 (L) 7.32 - 7.42      pCO2, venous (POC) 20.5 (L) 41 - 51 MMHG    pO2, venous (POC) 39 25 - 40 mmHg         CT Results  (Last 48 hours)    None            XR CHEST PORT    Result Date: 2/8/2022  No acute process identified by portable radiography        I saw the patient personally, took a history and did a complete physical exam at the bedside. I performed complex decision making in coming up with a diagnostic and treatment plan for the patient. I reviewed the patient's past medical records, current laboratory and radiology results, and actual Xray films/EKG. I have also discussed this case with the involved ED physician.     Care Plan discussed with:    Patient, ED Doc    Risk of deterioration:  High    Total Time Coordinating Admission:   65  minutes    Total Critical Care Time:         Prateek Marquez Ramakrishna Oconnor MD

## 2022-02-09 NOTE — PROGRESS NOTES
Hospitalist Progress Note    NAME: Leisa Bazzi   :  1980   MRN:  [id number]       Assessment / Plan:    Diabetes mellitus type 1 with DKA POA  Home regimen: lantus 30 units and sliding scale insulin  Last admit  to 2021 with DKA  Presents with N/V, abdominal pain              Denies insulin non compliance   with HCO3 11, AG 29, venous pH 7.25 with pCO2 of 21  S/p IV fluid and insulin GTT per DKA protocol  Venous gas pH 7.29 this morning  Anion gap has closed. Will transition to subcu insulin. Will do NPH 10 units twice daily for now and then transition to Lantus  A1c 13       Acute kidney injury POA BUN/creat 43 and 2.42  Baseline creatinine appears to be about 1.2  Suspect hypovolemic from osmotic diuresis  Continue IV fluid  Serial labs  Some improvement in creatinine     Possible UTI POA  UA > 100 WBC, 0-5 RBC, 4+ bacteria  Has suprapubic pain  Urine culture in lab  continue empiric IV ceftriaxone     Nausea/vomiting with abdominal pain POA  ? GI bleed with reported hematemesis POA  Suspect N/V  related to DKA  Pt reports onset of vomiting then 2 episodes of red blood              Sounds clinically like mague-davidson tear  Denies NSAID use  No further bleeding or vomiting since arriving on floor  No melena or bright red blood per rectum  Hemoglobin stable at 12.0  Continue IV PPI. Carafate added. GI consulted. Screening Covid test positive. Elective endoscopy once he is out of quarantine per GI as patient hemodynamically stable and Hb relatively stable. Follow H&H     COVID-19 infection  -Preprocedure screen Covid positive. -Vaccinated  -Symptomatic. SPO2 98% on room air  -Supportive management    Essential HTN POA  Hyperlipidemia POA  Continue statin  Hold home lisinopril due to FELECIA. Start p.o. amlodipine. PRN hydralazine    18.5 - 24.9 Normal weight / Body mass index is 21.56 kg/m².     Estimated discharge date:   Barriers:    DVT prophylaxis with SCDs till GI bleed ruled out     Code status: Full code  NOK: mother     Subjective:     Chief Complaint / Reason for Physician Visit   Discussed with RN events overnight. No acute events overnight    Feels better compared to when he came in  Complains of chest pain when he coughs. Tested positive for Covid here. Denies shortness of breath or significant cough. Review of Systems:  Symptom Y/N Comments  Symptom Y/N Comments   Fever/Chills    Chest Pain     Poor Appetite    Edema     Cough    Abdominal Pain     Sputum    Joint Pain     SOB/JOHNSTON    Pruritis/Rash     Nausea/vomit    Tolerating PT/OT     Diarrhea    Tolerating Diet     Constipation    Other       Could NOT obtain due to:      Objective:     VITALS:   Last 24hrs VS reviewed since prior progress note. Most recent are:  Patient Vitals for the past 24 hrs:   Temp Pulse Resp BP SpO2   02/09/22 1100 97.4 °F (36.3 °C) 90 13 (!) 159/77 98 %   02/09/22 0737 97.4 °F (36.3 °C) 86 13 (!) 143/82 98 %   02/09/22 0314 98.2 °F (36.8 °C) 88 12 106/64 100 %   02/08/22 2153 97.6 °F (36.4 °C) 100 17 124/66 100 %   02/08/22 2125 -- -- -- (!) 153/82 100 %   02/08/22 2055 -- -- -- 139/67 100 %   02/08/22 1834 97.7 °F (36.5 °C) 100 28 (!) 175/24 100 %       Intake/Output Summary (Last 24 hours) at 2/9/2022 1326  Last data filed at 2/9/2022 9927  Gross per 24 hour   Intake 1000 ml   Output 1 ml   Net 999 ml        I had a face to face encounter and independently examined this patient on 2/9/2022, as outlined below:  PHYSICAL EXAM:  General: WD, WN. Alert, cooperative, no acute distress    EENT:  EOMI. Anicteric sclerae. MMM  Resp:  CTA bilaterally, no wheezing or rales. No accessory muscle use  CV:  Regular  rhythm,  No edema  GI:  Soft, Non distended, Non tender. +Bowel sounds  Neurologic:  Alert and oriented X 3, normal speech,   Psych:   Good insight. Not anxious nor agitated  Skin:  No rashes.   No jaundice    Reviewed most current lab test results and cultures  YES  Reviewed most current radiology test results   YES  Review and summation of old records today    NO  Reviewed patient's current orders and MAR    YES  PMH/SH reviewed - no change compared to H&P  ________________________________________________________________________  Care Plan discussed with:    Comments   Patient x    Family      RN x    Care Manager     Consultant                        Multidiciplinary team rounds were held today with , nursing, pharmacist and clinical coordinator. Patient's plan of care was discussed; medications were reviewed and discharge planning was addressed. ________________________________________________________________________  Total NON critical care TIME:  25   Minutes    Total CRITICAL CARE TIME Spent:   Minutes non procedure based      Comments   >50% of visit spent in counseling and coordination of care x    ________________________________________________________________________  Yolanda Florence MD     Procedures: see electronic medical records for all procedures/Xrays and details which were not copied into this note but were reviewed prior to creation of Plan. LABS:  I reviewed today's most current labs and imaging studies.   Pertinent labs include:  Recent Labs     02/09/22 0256 02/08/22 2238 02/08/22  1922   WBC 15.7*  --  11.4*   HGB 11.1* 9.0* 12.0*   HCT 34.1* 28.2* 37.3   *  --  547*     Recent Labs     02/09/22  0837 02/09/22  0420 02/08/22  2343 02/08/22 2238 02/08/22 2238 02/08/22 2034 02/08/22 2034 02/08/22  1922 02/08/22  1922   * 144  --   --  133*   < > 130*   < > 128*   K 4.2 3.8  --   --  4.3   < > 5.0   < > 5.0   * 112*  --   --  99   < > 92*   < > 88*   CO2 26 18*  --   --  7*   < > 10*   < > 11*   GLU 86 239* 639*   < > 760*   < > 911*   < > 869*   BUN 42* 43*  --   --  44*   < > 43*   < > 43*   CREA 2.28* 2.61*  --   --  2.38*   < > 2.30*   < > 2.42*   CA 8.5 8.4*  --   --  8.4*   < > 8.5   < > 9.4   MG 2.5* 2.5*  -- --  2.9*   < > 2.7*  --   --    PHOS  --   --   --   --   --   --  8.5*  --   --    ALB  --   --   --   --   --   --   --   --  1.5*   TBILI  --   --   --   --   --   --   --   --  0.5   ALT  --   --   --   --   --   --   --   --  15   INR  --   --   --   --   --   --   --   --  1.0    < > = values in this interval not displayed.        Signed: Chalmers Cogan, MD

## 2022-02-09 NOTE — PROGRESS NOTES
Problem: Falls - Risk of  Goal: *Absence of Falls  Description: Document Nino Leroy Fall Risk and appropriate interventions in the flowsheet.   Outcome: Progressing Towards Goal  Note: Fall Risk Interventions:  Mobility Interventions: Bed/chair exit alarm,Patient to call before getting OOB,PT Consult for mobility concerns         Medication Interventions: Bed/chair exit alarm,Patient to call before getting OOB,Teach patient to arise slowly    Elimination Interventions: Bed/chair exit alarm,Call light in reach,Patient to call for help with toileting needs,Toileting schedule/hourly rounds              Problem: Isolation Precautions - Risk of Spread of Infection  Goal: Prevent transmission of infectious organism to others  Outcome: Progressing Towards Goal

## 2022-02-10 LAB
ANION GAP SERPL CALC-SCNC: 9 MMOL/L (ref 5–15)
BASOPHILS # BLD: 0 K/UL (ref 0–0.1)
BASOPHILS NFR BLD: 0 % (ref 0–1)
BUN SERPL-MCNC: 37 MG/DL (ref 6–20)
BUN/CREAT SERPL: 16 (ref 12–20)
CALCIUM SERPL-MCNC: 9 MG/DL (ref 8.5–10.1)
CHLORIDE SERPL-SCNC: 112 MMOL/L (ref 97–108)
CO2 SERPL-SCNC: 21 MMOL/L (ref 21–32)
CREAT SERPL-MCNC: 2.26 MG/DL (ref 0.7–1.3)
DIFFERENTIAL METHOD BLD: ABNORMAL
EOSINOPHIL # BLD: 0 K/UL (ref 0–0.4)
EOSINOPHIL NFR BLD: 0 % (ref 0–7)
ERYTHROCYTE [DISTWIDTH] IN BLOOD BY AUTOMATED COUNT: 15.4 % (ref 11.5–14.5)
GLUCOSE BLD STRIP.AUTO-MCNC: 102 MG/DL (ref 65–117)
GLUCOSE BLD STRIP.AUTO-MCNC: 107 MG/DL (ref 65–117)
GLUCOSE BLD STRIP.AUTO-MCNC: 69 MG/DL (ref 65–117)
GLUCOSE BLD STRIP.AUTO-MCNC: 70 MG/DL (ref 65–117)
GLUCOSE BLD STRIP.AUTO-MCNC: 95 MG/DL (ref 65–117)
GLUCOSE BLD STRIP.AUTO-MCNC: 98 MG/DL (ref 65–117)
GLUCOSE SERPL-MCNC: 156 MG/DL (ref 65–100)
HCT VFR BLD AUTO: 31.4 % (ref 36.6–50.3)
HGB BLD-MCNC: 10.4 G/DL (ref 12.1–17)
IMM GRANULOCYTES # BLD AUTO: 0.2 K/UL (ref 0–0.04)
IMM GRANULOCYTES NFR BLD AUTO: 1 % (ref 0–0.5)
LYMPHOCYTES # BLD: 2 K/UL (ref 0.8–3.5)
LYMPHOCYTES NFR BLD: 12 % (ref 12–49)
MCH RBC QN AUTO: 30.3 PG (ref 26–34)
MCHC RBC AUTO-ENTMCNC: 33.1 G/DL (ref 30–36.5)
MCV RBC AUTO: 91.5 FL (ref 80–99)
MONOCYTES # BLD: 1 K/UL (ref 0–1)
MONOCYTES NFR BLD: 6 % (ref 5–13)
NEUTS SEG # BLD: 13.5 K/UL (ref 1.8–8)
NEUTS SEG NFR BLD: 81 % (ref 32–75)
NRBC # BLD: 0 K/UL (ref 0–0.01)
NRBC BLD-RTO: 0 PER 100 WBC
PLATELET # BLD AUTO: 629 K/UL (ref 150–400)
PMV BLD AUTO: 10 FL (ref 8.9–12.9)
POTASSIUM SERPL-SCNC: 4.1 MMOL/L (ref 3.5–5.1)
RBC # BLD AUTO: 3.43 M/UL (ref 4.1–5.7)
RBC MORPH BLD: ABNORMAL
SERVICE CMNT-IMP: NORMAL
SODIUM SERPL-SCNC: 142 MMOL/L (ref 136–145)
WBC # BLD AUTO: 16.7 K/UL (ref 4.1–11.1)

## 2022-02-10 PROCEDURE — 74011250637 HC RX REV CODE- 250/637: Performed by: INTERNAL MEDICINE

## 2022-02-10 PROCEDURE — 99253 IP/OBS CNSLTJ NEW/EST LOW 45: CPT | Performed by: STUDENT IN AN ORGANIZED HEALTH CARE EDUCATION/TRAINING PROGRAM

## 2022-02-10 PROCEDURE — C9113 INJ PANTOPRAZOLE SODIUM, VIA: HCPCS | Performed by: INTERNAL MEDICINE

## 2022-02-10 PROCEDURE — 65660000000 HC RM CCU STEPDOWN

## 2022-02-10 PROCEDURE — 74011000250 HC RX REV CODE- 250: Performed by: INTERNAL MEDICINE

## 2022-02-10 PROCEDURE — 74011250637 HC RX REV CODE- 250/637: Performed by: STUDENT IN AN ORGANIZED HEALTH CARE EDUCATION/TRAINING PROGRAM

## 2022-02-10 PROCEDURE — 74011250636 HC RX REV CODE- 250/636: Performed by: STUDENT IN AN ORGANIZED HEALTH CARE EDUCATION/TRAINING PROGRAM

## 2022-02-10 PROCEDURE — 74011250637 HC RX REV CODE- 250/637: Performed by: PHYSICIAN ASSISTANT

## 2022-02-10 PROCEDURE — 74011250636 HC RX REV CODE- 250/636: Performed by: PHYSICIAN ASSISTANT

## 2022-02-10 PROCEDURE — 80048 BASIC METABOLIC PNL TOTAL CA: CPT

## 2022-02-10 PROCEDURE — 74011250636 HC RX REV CODE- 250/636: Performed by: INTERNAL MEDICINE

## 2022-02-10 PROCEDURE — 82962 GLUCOSE BLOOD TEST: CPT

## 2022-02-10 PROCEDURE — 85025 COMPLETE CBC W/AUTO DIFF WBC: CPT

## 2022-02-10 PROCEDURE — 74011000258 HC RX REV CODE- 258: Performed by: STUDENT IN AN ORGANIZED HEALTH CARE EDUCATION/TRAINING PROGRAM

## 2022-02-10 PROCEDURE — 36415 COLL VENOUS BLD VENIPUNCTURE: CPT

## 2022-02-10 PROCEDURE — 74011636637 HC RX REV CODE- 636/637: Performed by: INTERNAL MEDICINE

## 2022-02-10 RX ORDER — FINASTERIDE 5 MG/1
5 TABLET, FILM COATED ORAL DAILY
Status: DISCONTINUED | OUTPATIENT
Start: 2022-02-11 | End: 2022-02-14 | Stop reason: HOSPADM

## 2022-02-10 RX ORDER — INSULIN GLARGINE 100 [IU]/ML
15 INJECTION, SOLUTION SUBCUTANEOUS DAILY
Status: DISCONTINUED | OUTPATIENT
Start: 2022-02-11 | End: 2022-02-10

## 2022-02-10 RX ORDER — INSULIN GLARGINE 100 [IU]/ML
20 INJECTION, SOLUTION SUBCUTANEOUS DAILY
Status: DISCONTINUED | OUTPATIENT
Start: 2022-02-11 | End: 2022-02-10

## 2022-02-10 RX ORDER — INSULIN GLARGINE 100 [IU]/ML
10 INJECTION, SOLUTION SUBCUTANEOUS DAILY
Status: DISCONTINUED | OUTPATIENT
Start: 2022-02-11 | End: 2022-02-12

## 2022-02-10 RX ORDER — CARVEDILOL 12.5 MG/1
12.5 TABLET ORAL 2 TIMES DAILY WITH MEALS
Status: DISCONTINUED | OUTPATIENT
Start: 2022-02-10 | End: 2022-02-14 | Stop reason: HOSPADM

## 2022-02-10 RX ORDER — INSULIN GLARGINE 100 [IU]/ML
20 INJECTION, SOLUTION SUBCUTANEOUS
Status: COMPLETED | OUTPATIENT
Start: 2022-02-10 | End: 2022-02-10

## 2022-02-10 RX ORDER — METOCLOPRAMIDE HYDROCHLORIDE 5 MG/ML
5 INJECTION INTRAMUSCULAR; INTRAVENOUS EVERY 6 HOURS
Status: DISCONTINUED | OUTPATIENT
Start: 2022-02-10 | End: 2022-02-14 | Stop reason: HOSPADM

## 2022-02-10 RX ADMIN — CEFTRIAXONE 1 G: 1 INJECTION, POWDER, FOR SOLUTION INTRAMUSCULAR; INTRAVENOUS at 01:14

## 2022-02-10 RX ADMIN — CARVEDILOL 12.5 MG: 12.5 TABLET, FILM COATED ORAL at 16:25

## 2022-02-10 RX ADMIN — INSULIN GLARGINE 20 UNITS: 100 INJECTION, SOLUTION SUBCUTANEOUS at 09:25

## 2022-02-10 RX ADMIN — AMLODIPINE BESYLATE 5 MG: 5 TABLET ORAL at 09:27

## 2022-02-10 RX ADMIN — ONDANSETRON 4 MG: 2 INJECTION INTRAMUSCULAR; INTRAVENOUS at 04:07

## 2022-02-10 RX ADMIN — METOCLOPRAMIDE HYDROCHLORIDE 5 MG: 5 INJECTION INTRAMUSCULAR; INTRAVENOUS at 09:26

## 2022-02-10 RX ADMIN — SUCRALFATE 1 G: 1 TABLET ORAL at 22:54

## 2022-02-10 RX ADMIN — CARVEDILOL 12.5 MG: 12.5 TABLET, FILM COATED ORAL at 10:48

## 2022-02-10 RX ADMIN — PIPERACILLIN AND TAZOBACTAM 3.38 G: 3; .375 INJECTION, POWDER, LYOPHILIZED, FOR SOLUTION INTRAVENOUS at 22:21

## 2022-02-10 RX ADMIN — SUCRALFATE 1 G: 1 TABLET ORAL at 09:29

## 2022-02-10 RX ADMIN — MORPHINE SULFATE 2 MG: 2 INJECTION, SOLUTION INTRAMUSCULAR; INTRAVENOUS at 22:57

## 2022-02-10 RX ADMIN — SUCRALFATE 1 G: 1 TABLET ORAL at 15:59

## 2022-02-10 RX ADMIN — MORPHINE SULFATE 2 MG: 2 INJECTION, SOLUTION INTRAMUSCULAR; INTRAVENOUS at 16:25

## 2022-02-10 RX ADMIN — HYDRALAZINE HYDROCHLORIDE 20 MG: 20 INJECTION INTRAMUSCULAR; INTRAVENOUS at 10:56

## 2022-02-10 RX ADMIN — METOCLOPRAMIDE HYDROCHLORIDE 5 MG: 5 INJECTION INTRAMUSCULAR; INTRAVENOUS at 22:22

## 2022-02-10 RX ADMIN — SUCRALFATE 1 G: 1 TABLET ORAL at 12:39

## 2022-02-10 RX ADMIN — SODIUM CHLORIDE 40 MG: 9 INJECTION INTRAMUSCULAR; INTRAVENOUS; SUBCUTANEOUS at 22:22

## 2022-02-10 RX ADMIN — MORPHINE SULFATE 2 MG: 2 INJECTION, SOLUTION INTRAMUSCULAR; INTRAVENOUS at 19:57

## 2022-02-10 RX ADMIN — MORPHINE SULFATE 2 MG: 2 INJECTION, SOLUTION INTRAMUSCULAR; INTRAVENOUS at 04:10

## 2022-02-10 RX ADMIN — METOCLOPRAMIDE HYDROCHLORIDE 5 MG: 5 INJECTION INTRAMUSCULAR; INTRAVENOUS at 15:57

## 2022-02-10 RX ADMIN — ROSUVASTATIN CALCIUM 40 MG: 40 TABLET, FILM COATED ORAL at 22:29

## 2022-02-10 RX ADMIN — SODIUM CHLORIDE 40 MG: 9 INJECTION INTRAMUSCULAR; INTRAVENOUS; SUBCUTANEOUS at 09:27

## 2022-02-10 RX ADMIN — MORPHINE SULFATE 2 MG: 2 INJECTION, SOLUTION INTRAMUSCULAR; INTRAVENOUS at 09:39

## 2022-02-10 RX ADMIN — PIPERACILLIN AND TAZOBACTAM 3.38 G: 3; .375 INJECTION, POWDER, LYOPHILIZED, FOR SOLUTION INTRAVENOUS at 15:57

## 2022-02-10 NOTE — PROGRESS NOTES
ID note:    Patient seen and examined. Agree with Zosyn. Given increasing leukocytosis and some worsening in creatinine along with recent history of severe right hydronephrosis in January 2022, recommend CT abdomen pelvis to evaluate the urological system. Will recommend to obtain this in 1 to 2 days. Discussed with Dr. Rogelio Evans.      Full consult note to follow.       Suzanne Thomas MD  Infectious Diseases

## 2022-02-10 NOTE — PROGRESS NOTES
Problem: Falls - Risk of  Goal: *Absence of Falls  Description: Document Cherylle Cockayne Fall Risk and appropriate interventions in the flowsheet.   Outcome: Progressing Towards Goal  Note: Fall Risk Interventions:  Mobility Interventions: Bed/chair exit alarm,Patient to call before getting OOB,PT Consult for mobility concerns         Medication Interventions: Bed/chair exit alarm,Patient to call before getting OOB,Teach patient to arise slowly    Elimination Interventions: Bed/chair exit alarm,Call light in reach,Patient to call for help with toileting needs,Toileting schedule/hourly rounds

## 2022-02-10 NOTE — PROGRESS NOTES
Gastroenterology Progress Note  MANUEL Torres   for Dr. Yvan Bethea    2/10/2022    Admit Date: 2/8/2022    Subjective: Follow up for:  1) Coffee ground emesis    2) DKA- Improved    3) COVID-19 infection    4) Nausea and vomiting    5) Mild Anemia    Patient is on Clear Liquid. He had some brown emesis yesterday. Now with clear emesis today. Coughing and reports some SOB     Pain: Patient complains of abdominal pain no.       Bowel Movements: None, LBM on 2/8 in ER    Hgb 10.4 from 11.1  WBC increased to 16.7 from 15.7    Current Facility-Administered Medications   Medication Dose Route Frequency    metoclopramide HCl (REGLAN) injection 5 mg  5 mg IntraVENous Q6H    insulin glargine (LANTUS) injection 20 Units  20 Units SubCUTAneous NOW    [START ON 2/11/2022] insulin glargine (LANTUS) injection 20 Units  20 Units SubCUTAneous DAILY    DULoxetine (CYMBALTA) capsule 60 mg  60 mg Oral DAILY    rosuvastatin (CRESTOR) tablet 40 mg  40 mg Oral QHS    cefTRIAXone (ROCEPHIN) 1 g in 0.9% sodium chloride (MBP/ADV) 50 mL MBP  1 g IntraVENous Q24H    sucralfate (CARAFATE) tablet 1 g  1 g Oral AC&HS    amLODIPine (NORVASC) tablet 5 mg  5 mg Oral DAILY    0.9% sodium chloride infusion  75 mL/hr IntraVENous CONTINUOUS    hydrALAZINE (APRESOLINE) 20 mg/mL injection 20 mg  20 mg IntraVENous Q6H PRN    insulin lispro (HUMALOG) injection   SubCUTAneous AC&HS    glucose chewable tablet 16 g  4 Tablet Oral PRN    glucagon (GLUCAGEN) injection 1 mg  1 mg IntraMUSCular PRN    acetaminophen (TYLENOL) tablet 650 mg  650 mg Oral Q6H PRN    pantoprazole (PROTONIX) 40 mg in 0.9% sodium chloride 10 mL injection  40 mg IntraVENous Q12H    morphine injection 2 mg  2 mg IntraVENous Q3H PRN    bisacodyL (DULCOLAX) tablet 5 mg  5 mg Oral DAILY PRN    promethazine (PHENERGAN) tablet 12.5 mg  12.5 mg Oral Q6H PRN    Or    ondansetron (ZOFRAN) injection 4 mg  4 mg IntraVENous Q6H PRN        Objective:     Blood pressure (!) 185/99, pulse 94, temperature 97.4 °F (36.3 °C), resp. rate 13, height 5' 9\" (1.753 m), weight 66.2 kg (146 lb), SpO2 95 %. No intake/output data recorded.     02/08 1901 - 02/10 0700  In: 1000 [I.V.:1000]  Out: 2001 [Urine:1900]    EXAM:   GEN: This WM, NAD  HEENT: NCAT  Heart: RRR  Abdomen: soft, ND, NT, normal BS  Ext: no edema    Data Review    Recent Results (from the past 24 hour(s))   GLUCOSE, POC    Collection Time: 02/09/22  9:03 AM   Result Value Ref Range    Glucose (POC) 95 65 - 117 mg/dL    Performed by Katya TORIBIO    GLUCOSTABILIZER    Collection Time: 02/09/22  9:03 AM   Result Value Ref Range    Glucose 95 mg/dL    Insulin order 1.4 units/hour    Insulin adminstered 1.4 units/hour    Multiplier 0.041     Low target 150 mg/dL    High target 250 mg/dL    D50 order 0.0 ml    D50 administered 0.00 ml    Minutes until next BG 60 min    Order initials AK     Administered initials AK     GLSCOM Comments     COVID-19 RAPID TEST    Collection Time: 02/09/22  9:23 AM   Result Value Ref Range    Specimen source Nasopharyngeal      COVID-19 rapid test Detected (A) NOTD     GLUCOSE, POC    Collection Time: 02/09/22 10:07 AM   Result Value Ref Range    Glucose (POC) 98 65 - 117 mg/dL    Performed by Ana Moore RN    GLUCOSTABILIZER    Collection Time: 02/09/22 10:08 AM   Result Value Ref Range    Glucose 98 mg/dL    Insulin order 1.2 units/hour    Insulin adminstered 1.2 units/hour    Multiplier 0.031     Low target 150 mg/dL    High target 250 mg/dL    D50 order 0.0 ml    D50 administered 0.00 ml    Minutes until next BG 60 min    Order initials [id number] [address]     Administered initials [id number] [facility] Comments     GLUCOSE, POC    Collection Time: 02/09/22 11:16 AM   Result Value Ref Range    Glucose (POC) 105 65 - 117 mg/dL    Performed by Wes PowellRN)    GLUCOSTABILIZER    Collection Time: 02/09/22 11:16 AM   Result Value Ref Range    Glucose 105 mg/dL    Insulin order 0.9 units/hour    Insulin adminstered 0.9 units/hour    Multiplier 0.021     Low target 150 mg/dL    High target 250 mg/dL    D50 order 0.0 ml    D50 administered 0.00 ml    Minutes until next BG 60 min    Order initials AK     Administered initials AK     GLSCOM Comments     GLUCOSE, POC    Collection Time: 02/09/22 12:28 PM   Result Value Ref Range    Glucose (POC) 112 65 - 117 mg/dL    Performed by Yamini Barnes (RN)    GLUCOSTABILIZER    Collection Time: 02/09/22 12:28 PM   Result Value Ref Range    Glucose 112 mg/dL    Insulin order 0.6 units/hour    Insulin adminstered 0.6 units/hour    Multiplier 0.011     Low target 150 mg/dL    High target 250 mg/dL    D50 order 0.0 ml    D50 administered 0.00 ml    Minutes until next BG 60 min    Order initials AK     Administered initials Ak     GLSCOM Comments     MAGNESIUM    Collection Time: 02/09/22 12:34 PM   Result Value Ref Range    Magnesium 2.4 1.6 - 2.4 mg/dL   METABOLIC PANEL, BASIC    Collection Time: 02/09/22 12:34 PM   Result Value Ref Range    Sodium 143 136 - 145 mmol/L    Potassium 4.6 3.5 - 5.1 mmol/L    Chloride 114 (H) 97 - 108 mmol/L    CO2 22 21 - 32 mmol/L    Anion gap 7 5 - 15 mmol/L    Glucose 136 (H) 65 - 100 mg/dL    BUN 40 (H) 6 - 20 MG/DL    Creatinine 2.15 (H) 0.70 - 1.30 MG/DL    BUN/Creatinine ratio 19 12 - 20      GFR est AA 42 (L) >60 ml/min/1.73m2    GFR est non-AA 34 (L) >60 ml/min/1.73m2    Calcium 8.1 (L) 8.5 - 10.1 MG/DL   TROPONIN-HIGH SENSITIVITY    Collection Time: 02/09/22 12:34 PM   Result Value Ref Range    Troponin-High Sensitivity 154 (HH) 0 - 76 ng/L   POC VENOUS BLOOD GAS    Collection Time: 02/09/22  1:06 PM   Result Value Ref Range    pH, venous (POC) 7.29 (L) 7.32 - 7.42      pCO2, venous (POC) 43.7 41 - 51 MMHG    pO2, venous (POC) 64 (H) 25 - 40 mmHg    HCO3, venous (POC) 21.2 (L) 23.0 - 28.0 MMOL/L    sO2, venous (POC) 89.5 (H) 65 - 88 %    Base deficit, venous (POC) 5.1 mmol/L    Specimen type (POC) VENOUS BLOOD      Performed by [facility] EKG, 12 LEAD, INITIAL    Collection Time: 02/09/22  1:58 PM   Result Value Ref Range    Ventricular Rate 88 BPM    Atrial Rate 88 BPM    P-R Interval 120 ms    QRS Duration 100 ms    Q-T Interval 364 ms    QTC Calculation (Bezet) 440 ms    Calculated P Axis 80 degrees    Calculated R Axis 48 degrees    Calculated T Axis 75 degrees    Diagnosis       Poor data quality Baseline artifact  Normal sinus rhythm  Minimal voltage criteria for LVH, may be normal variant  When compared with ECG of 02-DEC-2021 07:46,  QRS axis shifted left  T wave amplitude has decreased in Inferior leads  T wave amplitude has decreased in Anterolateral leads  QT has shortened  Confirmed by Sanjana Weldon ([id number]) on 2/9/2022 5:02:37 PM     GLUCOSE, POC    Collection Time: 02/09/22  4:19 PM   Result Value Ref Range    Glucose (POC) 158 (H) 65 - 117 mg/dL    Performed by Popeye Pollard (RN)    TROPONIN-HIGH SENSITIVITY    Collection Time: 02/09/22  7:07 PM   Result Value Ref Range    Troponin-High Sensitivity 121 (HH) 0 - 76 ng/L   GLUCOSE, POC    Collection Time: 02/09/22 10:38 PM   Result Value Ref Range    Glucose (POC) 250 (H) 65 - 117 mg/dL    Performed by Sandra Cortez    CBC WITH AUTOMATED DIFF    Collection Time: 02/10/22  1:03 AM   Result Value Ref Range    WBC 16.7 (H) 4.1 - 11.1 K/uL    RBC 3.43 (L) 4.10 - 5.70 M/uL    HGB 10.4 (L) 12.1 - 17.0 g/dL    HCT 31.4 (L) 36.6 - 50.3 %    MCV 91.5 80.0 - 99.0 FL    MCH 30.3 26.0 - 34.0 PG    MCHC 33.1 30.0 - 36.5 g/dL    RDW 15.4 (H) 11.5 - 14.5 %    PLATELET 701 (H) 640 - 400 K/uL    MPV 10.0 8.9 - 12.9 FL    NRBC 0.0 0  WBC    ABSOLUTE NRBC 0.00 0.00 - 0.01 K/uL    NEUTROPHILS 81 (H) 32 - 75 %    LYMPHOCYTES 12 12 - 49 %    MONOCYTES 6 5 - 13 %    EOSINOPHILS 0 0 - 7 %    BASOPHILS 0 0 - 1 %    IMMATURE GRANULOCYTES 1 (H) 0.0 - 0.5 %    ABS. NEUTROPHILS 13.5 (H) 1.8 - 8.0 K/UL    ABS. LYMPHOCYTES 2.0 0.8 - 3.5 K/UL    ABS. MONOCYTES 1.0 0.0 - 1.0 K/UL    ABS.  EOSINOPHILS 0.0 0.0 - 0.4 K/UL    ABS. BASOPHILS 0.0 0.0 - 0.1 K/UL    ABS. IMM. GRANS. 0.2 (H) 0.00 - 0.04 K/UL    DF SMEAR SCANNED      RBC COMMENTS NORMOCYTIC, NORMOCHROMIC     METABOLIC PANEL, BASIC    Collection Time: 02/10/22  1:03 AM   Result Value Ref Range    Sodium 142 136 - 145 mmol/L    Potassium 4.1 3.5 - 5.1 mmol/L    Chloride 112 (H) 97 - 108 mmol/L    CO2 21 21 - 32 mmol/L    Anion gap 9 5 - 15 mmol/L    Glucose 156 (H) 65 - 100 mg/dL    BUN 37 (H) 6 - 20 MG/DL    Creatinine 2.26 (H) 0.70 - 1.30 MG/DL    BUN/Creatinine ratio 16 12 - 20      GFR est AA 39 (L) >60 ml/min/1.73m2    GFR est non-AA 32 (L) >60 ml/min/1.73m2    Calcium 9.0 8.5 - 10.1 MG/DL   GLUCOSE, POC    Collection Time: 02/10/22  8:36 AM   Result Value Ref Range    Glucose (POC) 98 65 - 117 mg/dL    Performed by Koffi Lloyd (RN)      Recent Labs     02/10/22  0103 02/09/22  0256   WBC 16.7* 15.7*   HGB 10.4* 11.1*   HCT 31.4* 34.1*   * 474*     Recent Labs     02/10/22  0103 02/09/22  1234 02/09/22  0837 02/09/22  0420 02/09/22  0420 02/08/22  2238 02/08/22  2034    143 146*   < > 144   < > 130*   K 4.1 4.6 4.2   < > 3.8   < > 5.0   * 114* 115*   < > 112*   < > 92*   CO2 21 22 26   < > 18*   < > 10*   BUN 37* 40* 42*   < > 43*   < > 43*   CREA 2.26* 2.15* 2.28*   < > 2.61*   < > 2.30*   * 136* 86   < > 239*   < > 911*   CA 9.0 8.1* 8.5   < > 8.4*   < > 8.5   MG  --  2.4 2.5*  --  2.5*   < > 2.7*   PHOS  --   --   --   --   --   --  8.5*    < > = values in this interval not displayed. Recent Labs     02/08/22 1922   ALT 15   *   TBILI 0.5   TP 6.5   ALB 1.5*   GLOB 5.0*   LPSE 39*     Recent Labs     02/08/22 1922   INR 1.0   PTP 10.7      No results for input(s): FE, TIBC, PSAT, FERR in the last 72 hours. No results found for: FOL, RBCF   No results for input(s): PH, PCO2, PO2 in the last 72 hours. No results for input(s): CPK, CKNDX, TROIQ in the last 72 hours.     No lab exists for component: CPKMB  Lab Results   Component Value Date/Time    Cholesterol, total 192 11/15/2021 12:01 PM    HDL Cholesterol 61 11/15/2021 12:01 PM    LDL, calculated 89.4 11/15/2021 12:01 PM    Triglyceride 208 (H) 11/15/2021 12:01 PM    CHOL/HDL Ratio 3.1 11/15/2021 12:01 PM     Lab Results   Component Value Date/Time    Glucose (POC) 98 02/10/2022 08:36 AM    Glucose (POC) 250 (H) 02/09/2022 10:38 PM    Glucose (POC) 158 (H) 02/09/2022 04:19 PM    Glucose (POC) 112 02/09/2022 12:28 PM    Glucose (POC) 105 02/09/2022 11:16 AM     Lab Results   Component Value Date/Time    Color YELLOW/STRAW 02/08/2022 07:56 PM    Appearance TURBID (A) 02/08/2022 07:56 PM    Specific gravity 1.024 02/08/2022 07:56 PM    Specific gravity 1.015 01/01/2022 11:19 AM    pH (UA) 5.5 02/08/2022 07:56 PM    Protein 300 (A) 02/08/2022 07:56 PM    Glucose >1,000 (A) 02/08/2022 07:56 PM    Ketone 80 (A) 02/08/2022 07:56 PM    Bilirubin Negative 02/08/2022 07:56 PM    Urobilinogen 0.2 02/08/2022 07:56 PM    Nitrites Negative 02/08/2022 07:56 PM    Leukocyte Esterase MODERATE (A) 02/08/2022 07:56 PM    Epithelial cells FEW 02/08/2022 07:56 PM    Bacteria 4+ (A) 02/08/2022 07:56 PM    WBC >100 (H) 02/08/2022 07:56 PM    RBC 0-5 02/08/2022 07:56 PM           Assessment:     Active Problems:    DKA (diabetic ketoacidosis) ([address] (12/2/2021)      Coffee ground emesis (2/9/2022)      COVID-19 (2/9/2022)        Plan:   Patient with some further brown emesis yesterday but back to clear emesis today. Hgb slightly decreased to 10.4 today but remains stable and BP upper limits of normal.  Given continued vomiting will empirically start on Reglan to see if that provides relief. SE of TD reviewed with patient and to make RN/staff aware should he experience as we would stop medication right away. He remains symptomatic from COVID infection, hold off on EGD unless he has hemodynamically unstable bleeding. Can schedule as an O/P once he is out of quarantine.       Nicole Perez, PA    02/10/22  8:42 AM  [address] [facility], [address] [place] [address]  [phone]  [address] [place] [address] [place]: [phone]      . I have discussed the case with the mid-level provider in detail. I have reviewed the patient's chart and the note. I personally performed all components of the history, physical, and medical decision making and agree with the assessment and plan, with minor modifications as noted. Please see APPs note for full details. Data Review:  reviewed    Ref. Range 2/10/2022 01:03   WBC Latest Ref Range: 4.1 - 11.1 K/uL 16.7 (H)   NRBC Latest Ref Range: 0  WBC 0.0   RBC Latest Ref Range: 4.10 - 5.70 M/uL 3.43 (L)   HGB Latest Ref Range: 12.1 - 17.0 g/dL 10.4 (L)   HCT Latest Ref Range: 36.6 - 50.3 % 31.4 (L)   MCV Latest Ref Range: 80.0 - 99.0 FL 91.5   MCH Latest Ref Range: 26.0 - 34.0 PG 30.3   MCHC Latest Ref Range: 30.0 - 36.5 g/dL 33.1   RDW Latest Ref Range: 11.5 - 14.5 % 15.4 (H)   PLATELET Latest Ref Range: 150 - 400 K/uL 629 (H)   MPV Latest Ref Range: 8.9 - 12.9 FL 10.0   NEUTROPHILS Latest Ref Range: 32 - 75 % 81 (H)   LYMPHOCYTES Latest Ref Range: 12 - 49 % 12   MONOCYTES Latest Ref Range: 5 - 13 % 6   EOSINOPHILS Latest Ref Range: 0 - 7 % 0   BASOPHILS Latest Ref Range: 0 - 1 % 0   IMMATURE GRANULOCYTES Latest Ref Range: 0.0 - 0.5 % 1 (H)   DF Latest Units:   SMEAR SCANNED   ABSOLUTE NRBC Latest Ref Range: 0.00 - 0.01 K/uL 0.00   ABS. NEUTROPHILS Latest Ref Range: 1.8 - 8.0 K/UL 13.5 (H)   ABS. IMM. GRANS. Latest Ref Range: 0.00 - 0.04 K/UL 0.2 (H)   ABS. LYMPHOCYTES Latest Ref Range: 0.8 - 3.5 K/UL 2.0   ABS. MONOCYTES Latest Ref Range: 0.0 - 1.0 K/UL 1.0   ABS. EOSINOPHILS Latest Ref Range: 0.0 - 0.4 K/UL 0.0   ABS.  BASOPHILS Latest Ref Range: 0.0 - 0.1 K/UL 0.0   RBC COMMENTS Latest Units:   NORMOCYTIC, NORMOCHROMIC   Sodium Latest Ref Range: 136 - 145 mmol/L 142   Potassium Latest Ref Range: 3.5 - 5.1 mmol/L 4.1   Chloride Latest Ref Range: 97 - 108 mmol/L 112 (H)   CO2 Latest Ref Range: 21 - 32 mmol/L 21   Anion gap Latest Ref Range: 5 - 15 mmol/L 9   Glucose Latest Ref Range: 65 - 100 mg/dL 156 (H)   BUN Latest Ref Range: 6 - 20 MG/DL 37 (H)   Creatinine Latest Ref Range: 0.70 - 1.30 MG/DL 2.26 (H)   BUN/Creatinine ratio Latest Ref Range: 12 - 20   16   Calcium Latest Ref Range: 8.5 - 10.1 MG/DL 9.0   GFR est non-AA Latest Ref Range: >60 ml/min/1.73m2 32 (L)   GFR est AA Latest Ref Range: >60 ml/min/1.73m2 39 (L)   Troponin-High Sensitivity Latest Ref Range: 0 - 76 ng/L      Plan and discussion:  Mr. Brady Roldan is a 46yo with uncontrolled IDDM with a high HBA1C and COVID-19 being followed for hematemesis. He likely had esophagitis related bleed. This is now better. Still has nausea and has a hx of LA grade C esophagitis, made worse by DKA. I agree with Reglan trial.He has had a GE Scan done on November 2021 which was normal. CT findings reviewed  Continue IV PPI 40mg BID and sucralfate. No current ndication for emergent EGD. COVID treatment as per admitting team.             Signed By: Katya Krishnan.  Susy Stoddard MD    2/10/2022  2:28 PM

## 2022-02-10 NOTE — CONSULTS
Urology Consult    Patient: Mercy Griffin MRN: [id number]  SSN: [id number]    YOB: 1980  Age: 46 y.o. Sex: male          Date of Consultation:  February 10, 2022  Requesting Physician: Julieth Medrano,*  Reason for Consultation: right hydronephrosis           Assessment/Plan:  Right hydronephrosis with no obvious obstructing mass or stone. Largely distended bladder >1 liter at timing of imaging. FELECIA- unsure baseline  COVID +  prostatomegaly   UTI with GNR    - pt afebrile, with stable creat and good UOP along with COVID infection would favor conservative management of hydronephrosis at this time. Pt with similar urinary retention and right sided hydronephrosis on 12/28/21 treated with hansen  - will watch renal function and urine output, if creat comes down would prefer repeat imaging with contrast to better assess resolution of hydronephrosis. If baseline creat stays elevated can do CT w/o, would consider nephrology consult if creat does not improve with hansen. -will start proscar   -GI following for GI bleed  -will follow up tomorrow    Supervising MD, Dr. Meet Palomino        History of Present Illness:  Patient is a 46 y.o. male admitted 2/8/2022 to the hospital for DKA (diabetic ketoacidosis) ([facility] [E11.10]. He presented with n/v and SOB for days and was found to have Covid+. He also presented with bloody emesis and admission glucose 911. Admission creat 2.42. Urology was consulted after ELICEO showing severe right sided hydronephrosis in setting of >1 liter urinary retention. No clear obstructing stone or mass. The impression on US notes renal disease. Unsure baseline creat. Pt had just followed with [facility] in clinic a month ago for successful voiding trial after pt was in the hospital (12/28/21) with similar right hydronephrosis with bladder distention and was discharged with hansen. He was to follow up with [facility]. Notes reviewed.  Hansen placed yesterday afternoon after US results with 1 liter urine drained. Pt not seen due to covid infection   Chart reviewed:  Af,vss, 2L NC  Good UOP from hansen   Urine cx GNR  Wbc 16.7, hgb stable, plts 629  INR 1.0  Creat 2.3 (2.2 yesterday and hansen was placed yesterday afternoon) unsure baseline    CT abd w/o contrast 12/28/21 personally reviewed:  IMPRESSION     Severe bladder distention and severe right hydronephrosis, with prostatomegaly  highly concerning for bladder outlet obstruction. Past Medical History:  No Known Allergies   Prior to Admission medications    Medication Sig Start Date End Date Taking? Authorizing Provider   rosuvastatin (CRESTOR) 40 mg tablet Take 1 Tablet by mouth nightly. 1/27/22   Nina Ventura MD   ondansetron (ZOFRAN ODT) 4 mg disintegrating tablet Take 1 Tablet by mouth every eight (8) hours as needed for Nausea or Vomiting. 12/28/21   Fallon Barnes MD   DULoxetine (CYMBALTA) 60 mg capsule Take 1 Capsule by mouth daily. 12/13/21   Nina Ventura MD   pantoprazole (Protonix) 40 mg tablet Take 1 Tablet by mouth daily. 12/6/21   Lashon Hagan MD   insulin aspart U-100 (NovoLOG Flexpen U-100 Insulin) 100 unit/mL (3 mL) inpn 8 Units by SubCUTAneous route Before breakfast, lunch, and dinner. (Novolog or Humalog, whichever more preferred: Inject 5 units with each meal + correction insulin, up to 30 units per day 12/6/21   Lashon Hagan MD   insulin glargine (Lantus Solostar U-100 Insulin) 100 unit/mL (3 mL) inpn INJECT 30 UNITS SUBCUTANEOUSLY DAILY 11/15/21   Nina Ventura MD   ergocalciferol (ERGOCALCIFEROL) 1,250 mcg (50,000 unit) capsule Take 1 capsule by mouth once a week 10/30/21   Nina Ventura MD   pregabalin (Lyrica) 75 mg capsule Take 75 mg by mouth two (2) times a day. Provider, Historical   lisinopriL (PRINIVIL, ZESTRIL) 20 mg tablet Take 1 Tablet by mouth daily.  7/14/21   Nina Ventura MD      PMHx:  has a past medical history of Bipolar 1 disorder, depressed ([facility] [address] Bipolar disorder ([facility] [address] Depression, Diabetes ([facility] [address] [facility] DKA, type 1 ([facility] [address] (1/27/2013), H/O noncompliance with medical treatment, presenting hazards to health, MRSA (methicillin resistant staph aureus) culture positive, MRSA (methicillin resistant Staphylococcus aureus), Noncompliance with medication regimen, Second hand smoke exposure, Seizure ([facility] [address] and Seizures ([facility] [address] (2006 or 2007). PSurgHx:  has a past surgical history that includes hx orthopaedic (Left); hx heent; and upper gi endoscopy,biopsy (11/20/2018). PSocHx:  reports that he quit smoking about 1 years ago. His smoking use included cigarettes. He started smoking about 22 years ago. He has a 1.60 pack-year smoking history. He has never used smokeless tobacco. He reports that he does not drink alcohol and does not use drugs. ROS:  Admission ROS by Yaz Barry MD from 2/8/2022 were reviewed with the patient and changes (other than per HPI) include: none.           Lab Results   Component Value Date/Time    WBC 16.7 (H) 02/10/2022 01:03 AM    HCT 31.4 (L) 02/10/2022 01:03 AM    PLATELET 559 (H) 18/38/5168 01:03 AM    Sodium 142 02/10/2022 01:03 AM    Potassium 4.1 02/10/2022 01:03 AM    Chloride 112 (H) 02/10/2022 01:03 AM    CO2 21 02/10/2022 01:03 AM    BUN 37 (H) 02/10/2022 01:03 AM    Creatinine 2.26 (H) 02/10/2022 01:03 AM    Glucose 156 (H) 02/10/2022 01:03 AM    Calcium 9.0 02/10/2022 01:03 AM    Magnesium 2.4 02/09/2022 12:34 PM    INR 1.0 02/08/2022 07:22 PM    Prostate Specific Ag 0.3 04/01/2021 10:00 AM       UA:   Lab Results   Component Value Date/Time    Color YELLOW/STRAW 02/08/2022 07:56 PM    Appearance TURBID (A) 02/08/2022 07:56 PM    Specific gravity 1.024 02/08/2022 07:56 PM    Specific gravity 1.015 01/01/2022 11:19 AM    pH (UA) 5.5 02/08/2022 07:56 PM    Protein 300 (A) 02/08/2022 07:56 PM    Glucose >1,000 (A) 02/08/2022 07:56 PM    Ketone 80 (A) 02/08/2022 07:56 PM    Bilirubin Negative 02/08/2022 07:56 PM    Urobilinogen 0.2 02/08/2022 07:56 PM    Nitrites Negative 02/08/2022 07:56 PM    Leukocyte Esterase MODERATE (A) 02/08/2022 07:56 PM    Epithelial cells FEW 02/08/2022 07:56 PM    Bacteria 4+ (A) 02/08/2022 07:56 PM    WBC >100 (H) 02/08/2022 07:56 PM    RBC 0-5 02/08/2022 07:56 PM         Signed By: Zuly Peterson, NP  - February 10, 2022

## 2022-02-10 NOTE — PROGRESS NOTES
Hospitalist Progress Note    NAME: Leisa Bazzi   :  1980   MRN:  [id number]       Assessment / Plan:    Diabetes mellitus type 1 with DKA POA  A1c 13  Home regimen: lantus 30 units and sliding scale insulin  Last admit  to 2021 with DKA  Presents with N/V, abdominal pain              Denies insulin non compliance   with HCO3 11, AG 29, venous pH 7.25 with pCO2 of 21 on admission  S/p IV fluid and insulin GTT per DKA protocol  Transitioned to subcutaneous insulin once anion gap closed  Will reduce Lantus to 15 unit in light of FELECIA and tightly controlled blood glucose.       Urinary retention and severe right hydronephrosis   Acute kidney injury likely secondary to above  Hx of urinary retention, prostatomegaly, and severe right hydronephrosis in 21  Baseline creatinine appears to be about 1.2  Cr 2.3 >> 2.6 >> 2.2  Noted to have 1000 cc urinary retention yesterday. Beard placed on   Urology consult  Started on proscar  Follow BMP daily     Complicated urinary tract infection-POA  Suprapubic pain.  UA > 100 WBC, 0-5 RBC, 4+ bacteria  Urine culture growing lactose fermenting GNR  On empiric ceftriaxone. WBC trending up. We will broaden antibiotic to renally adjusted Zosyn  ID consult     Nausea/vomiting with abdominal pain POA  ? GI bleed with reported hematemesis POA  Suspect N/V  related to DKA and possible UTI  Pt reports onset of vomiting then 2 episodes of red blood              Sounds clinically like mague-davidson tear  Denies NSAID use. No further bleeding or vomiting since arriving on floor  Hb stable between 10 and 11. Follow H&H  Continue IV PPI and Carafate  GI consulted. Screening Covid test positive. Elective endoscopy once he is out of quarantine per GI as patient hemodynamically stable and Hb relatively stable.     COVID-19 infection  -Preprocedure screen Covid positive. -Vaccinated  -Asymptomatic apart from occasional cough.   SPO2 98% on room air  -Supportive management    Essential HTN POA  Hyperlipidemia POA  Continue statin  Hold home lisinopril due to FELECIA. Start p.o. amlodipine. PRN hydralazine    18.5 - 24.9 Normal weight / Body mass index is 21.56 kg/m². Estimated discharge date: 2/12  Barriers:    DVT prophylaxis with SCDs till GI bleed ruled out     Code status: Full code  NOK: mother     Subjective:     Chief Complaint / Reason for Physician Visit   Discussed with RN events overnight. No acute events overnight  Urinary retention yesterday. Beard placed. Occasional cough    Review of Systems:  Symptom Y/N Comments  Symptom Y/N Comments   Fever/Chills    Chest Pain     Poor Appetite    Edema     Cough    Abdominal Pain     Sputum    Joint Pain     SOB/JOHNSTON    Pruritis/Rash     Nausea/vomit    Tolerating PT/OT     Diarrhea    Tolerating Diet     Constipation    Other       Could NOT obtain due to:      Objective:     VITALS:   Last 24hrs VS reviewed since prior progress note. Most recent are:  Patient Vitals for the past 24 hrs:   Temp Pulse Resp BP SpO2   02/10/22 1240 -- 88 -- 116/81 --   02/10/22 1100 97.7 °F (36.5 °C) 94 15 (!) 185/102 98 %   02/10/22 1056 -- 94 -- (!) 193/113 --   02/10/22 1052 98.4 °F (36.9 °C) 97 -- -- --   02/10/22 1048 -- 100 -- (!) 182/99 --   02/10/22 0800 97.4 °F (36.3 °C) 94 13 (!) 185/99 95 %   02/10/22 0357 98.9 °F (37.2 °C) 100 20 (!) 164/84 97 %   02/09/22 2304 98.2 °F (36.8 °C) (!) 103 18 (!) 152/81 100 %   02/09/22 1944 -- (!) 106 20 (!) 178/95 100 %   02/09/22 1440 97.4 °F (36.3 °C) 79 16 (!) 168/102 97 %       Intake/Output Summary (Last 24 hours) at 2/10/2022 1346  Last data filed at 2/10/2022 0414  Gross per 24 hour   Intake --   Output 1900 ml   Net -1900 ml        I had a face to face encounter and independently examined this patient on 2/10/2022, as outlined below:  PHYSICAL EXAM:  General: WD, WN. Alert, cooperative, no acute distress    EENT:  EOMI. Anicteric sclerae.  MMM  Resp:  CTA bilaterally, no wheezing or rales. No accessory muscle use  CV:  Regular  rhythm,  No edema  GI:  Soft, Non distended, Non tender. +Bowel sounds  Neurologic:  Alert and oriented X 3, normal speech,   Psych:   Good insight. Not anxious nor agitated  Skin:  No rashes. No jaundice    Reviewed most current lab test results and cultures  YES  Reviewed most current radiology test results   YES  Review and summation of old records today    NO  Reviewed patient's current orders and MAR    YES  PMH/SH reviewed - no change compared to H&P  ________________________________________________________________________  Care Plan discussed with:    Comments   Patient x    Family      RN x    Care Manager     Consultant                        Multidiciplinary team rounds were held today with , nursing, pharmacist and clinical coordinator. Patient's plan of care was discussed; medications were reviewed and discharge planning was addressed. ________________________________________________________________________  Total NON critical care TIME:  25   Minutes    Total CRITICAL CARE TIME Spent:   Minutes non procedure based      Comments   >50% of visit spent in counseling and coordination of care x    ________________________________________________________________________  Ramya Chopra MD     Procedures: see electronic medical records for all procedures/Xrays and details which were not copied into this note but were reviewed prior to creation of Plan. LABS:  I reviewed today's most current labs and imaging studies. Pertinent labs include:  Recent Labs     02/10/22  0103 02/09/22  0256 02/08/22  2238 02/08/22 1922 02/08/22  1922   WBC 16.7* 15.7*  --   --  11.4*   HGB 10.4* 11.1* 9.0*   < > 12.0*   HCT 31.4* 34.1* 28.2*   < > 37.3   * 474*  --   --  547*    < > = values in this interval not displayed.      Recent Labs     02/10/22  0103 02/09/22  1234 02/09/22  6949 02/09/22  0420 02/09/22  7745 02/08/22 2238 02/08/22 2034 02/08/22 1922 02/08/22 1922    143 146*   < > 144   < > 130*   < > 128*   K 4.1 4.6 4.2   < > 3.8   < > 5.0   < > 5.0   * 114* 115*   < > 112*   < > 92*   < > 88*   CO2 21 22 26   < > 18*   < > 10*   < > 11*   * 136* 86   < > 239*   < > 911*   < > 869*   BUN 37* 40* 42*   < > 43*   < > 43*   < > 43*   CREA 2.26* 2.15* 2.28*   < > 2.61*   < > 2.30*   < > 2.42*   CA 9.0 8.1* 8.5   < > 8.4*   < > 8.5   < > 9.4   MG  --  2.4 2.5*  --  2.5*   < > 2.7*  --   --    PHOS  --   --   --   --   --   --  8.5*  --   --    ALB  --   --   --   --   --   --   --   --  1.5*   TBILI  --   --   --   --   --   --   --   --  0.5   ALT  --   --   --   --   --   --   --   --  15   INR  --   --   --   --   --   --   --   --  1.0    < > = values in this interval not displayed.        Signed: Yolanda Florence MD

## 2022-02-10 NOTE — CONSULTS
Infectious Disease Consult Note    Reason for Consult: complicated UTI  Date of Consultation: February 10, 2022  Date of Admission: 2/8/2022  Referring Physician: Dr. Iwona Henley      HPI:  Yas Aguilar is a 46y.o. year old male with history of uncontrolled DM1 (A1c 13.0 on 2/8/22),  HTN, prostatomegaly, bipolar disorder, seizure d/o, who presented on 2/8/22 for abdominal pain. Patient is vaccinated against COVID-19 x2, denies receiving a booster. Recent COVID-19 PCR negative on 2/6/2022 at [facility]. Patient had presented to the ED on 12/28/22 for abdominal pain/n/v. Found to be hyperglycemic. Was also found to have urinary retention of >999cc. Had an FELECIA. CT showed severe bladder distention right hydronephrosis with prostatomegaly. Hansen was placed for decompression, and to be left for 10 days while outpatient urology follow up arranged with either [facility] or [facility]. No pyuria was seen on U/A that time. He presented to the ED this time on 2/8/22 for abdominal pain/n/v/bloody emesis. Found to be in DKA on admission. Also found to be Covid positive; CXR negative and no major respiratory symptoms. WBC 11.4 on arrival, creatinine 2.42 on arrival. Unclear if patient had a hansen on arrival or not. Renal US again showed severe right hydronephrosis and markedly distended bladder with internal debris and significant post void residual. U/A > 100 WBCs, urine cultures growing >100K lactose fermenting GNRs. Started on ceftriaxone on arrival, but rising serum creatinine and leukocytosis on 2/10 and hence escalated to zosyn. No blood cultures so far this admission. Hansen placed 2/9/22. Patient reporting feeling OK on exam 2/10. Mild flank pain right side.        Past Medical History:  Past Medical History:   Diagnosis Date    [facility]     Bipolar disorder [facility]     Depression     Diabetes ([facility]     DKA, type 1 ([facility] [address] [facility] 1/27/2013    diagnosed age 23    H/O noncompliance with medical treatment, presenting hazards to health     MRSA (methicillin resistant staph aureus) culture positive     MRSA (methicillin resistant Staphylococcus aureus)     Face    Noncompliance with medication regimen     Second hand smoke exposure     Seiz[facility] ([facility] [address]     Seizures ([facility] [address] 2006 or 2007    one episode during prison         Surgical History:  Past Surgical History:   Procedure Laterality Date    HX HEENT      top left wisdom tooth    HX ORTHOPAEDIC Left     wrist; MCV    UPPER GI ENDOSCOPY,BIOPSY  11/20/2018              Family History:   Family History   Problem Relation Age of Onset    Diabetes Mother     Diabetes Other         neice, type 1          Social History:     · Reviewed in chart        Allergies:  No Known Allergies      Review of Systems:     Negative except as in HPI    Medications:  No current facility-administered medications on file prior to encounter. Current Outpatient Medications on File Prior to Encounter   Medication Sig Dispense Refill    rosuvastatin (CRESTOR) 40 mg tablet Take 1 Tablet by mouth nightly. 30 Tablet 0    ondansetron (ZOFRAN ODT) 4 mg disintegrating tablet Take 1 Tablet by mouth every eight (8) hours as needed for Nausea or Vomiting. 20 Tablet 0    DULoxetine (CYMBALTA) 60 mg capsule Take 1 Capsule by mouth daily. 30 Capsule 3    pantoprazole (Protonix) 40 mg tablet Take 1 Tablet by mouth daily. 30 Tablet 0    insulin aspart U-100 (NovoLOG Flexpen U-100 Insulin) 100 unit/mL (3 mL) inpn 8 Units by SubCUTAneous route Before breakfast, lunch, and dinner.  (Novolog or Humalog, whichever more preferred: Inject 5 units with each meal + correction insulin, up to 30 units per day 10 Adjustable Dose Pre-filled Pen Syringe 3    insulin glargine (Lantus Solostar U-100 Insulin) 100 unit/mL (3 mL) inpn INJECT 30 UNITS SUBCUTANEOUSLY DAILY 30 mL 4    ergocalciferol (ERGOCALCIFEROL) 1,250 mcg (50,000 unit) capsule Take 1 capsule by mouth once a week 12 Capsule 0    pregabalin (Lyrica) 75 mg capsule Take 75 mg by mouth two (2) times a day.  lisinopriL (PRINIVIL, ZESTRIL) 20 mg tablet Take 1 Tablet by mouth daily.  30 Tablet 3           Physical Exam:    Vitals:   Patient Vitals for the past 24 hrs:   Temp Pulse Resp BP SpO2   02/10/22 1546 98.2 °F (36.8 °C) 90 16 121/78 98 %   02/10/22 1240 -- 88 -- 116/81 --   02/10/22 1100 97.7 °F (36.5 °C) 94 15 (!) 185/102 98 %   02/10/22 1056 -- 94 -- (!) 193/113 --   02/10/22 1052 98.4 °F (36.9 °C) 97 -- -- --   02/10/22 1048 -- 100 -- (!) 182/99 --   02/10/22 0800 97.4 °F (36.3 °C) 94 13 (!) 185/99 95 %   02/10/22 0357 98.9 °F (37.2 °C) 100 20 (!) 164/84 97 %   02/09/22 2304 98.2 °F (36.8 °C) (!) 103 18 (!) 152/81 100 %   02/09/22 1944 -- (!) 106 20 (!) 178/95 100 %   ·   · GEN: NAD  · HEENT: Normocephalic, atraumatic, PERRL, no scleral icterus  · CV: S1, S2 heard regularly, no murmurs, thrills or rubs heard   · Lungs: room air  · Abdomen: soft, non distended  · Genitourinary:  + hansen  · Extremities: no edema  · Neuro: Alert, oriented to time, place and situation, moves all extremities to commands, verbal   · Skin: no rash  · Psych: good affect, good eye contact, non tearful   · Lines: PIVs      Labs:   Recent Results (from the past 24 hour(s))   GLUCOSE, POC    Collection Time: 02/09/22  4:19 PM   Result Value Ref Range    Glucose (POC) 158 (H) 65 - 117 mg/dL    Performed by Evonne Colunga (ASAD)    TROPONIN-HIGH SENSITIVITY    Collection Time: 02/09/22  7:07 PM   Result Value Ref Range    Troponin-High Sensitivity 121 (HH) 0 - 76 ng/L   GLUCOSE, POC    Collection Time: 02/09/22 10:38 PM   Result Value Ref Range    Glucose (POC) 250 (H) 65 - 117 mg/dL    Performed by Sandra Acosta    CBC WITH AUTOMATED DIFF    Collection Time: 02/10/22  1:03 AM   Result Value Ref Range    WBC 16.7 (H) 4.1 - 11.1 K/uL    RBC 3.43 (L) 4.10 - 5.70 M/uL    HGB 10.4 (L) 12.1 - 17.0 g/dL    HCT 31.4 (L) 36.6 - 50.3 %    MCV 91.5 80.0 - 99.0 FL    MCH 30.3 26.0 - 34.0 PG MCHC 33.1 30.0 - 36.5 g/dL    RDW 15.4 (H) 11.5 - 14.5 %    PLATELET 620 (H) 365 - 400 K/uL    MPV 10.0 8.9 - 12.9 FL    NRBC 0.0 0  WBC    ABSOLUTE NRBC 0.00 0.00 - 0.01 K/uL    NEUTROPHILS 81 (H) 32 - 75 %    LYMPHOCYTES 12 12 - 49 %    MONOCYTES 6 5 - 13 %    EOSINOPHILS 0 0 - 7 %    BASOPHILS 0 0 - 1 %    IMMATURE GRANULOCYTES 1 (H) 0.0 - 0.5 %    ABS. NEUTROPHILS 13.5 (H) 1.8 - 8.0 K/UL    ABS. LYMPHOCYTES 2.0 0.8 - 3.5 K/UL    ABS. MONOCYTES 1.0 0.0 - 1.0 K/UL    ABS. EOSINOPHILS 0.0 0.0 - 0.4 K/UL    ABS. BASOPHILS 0.0 0.0 - 0.1 K/UL    ABS. IMM. GRANS. 0.2 (H) 0.00 - 0.04 K/UL    DF SMEAR SCANNED      RBC COMMENTS NORMOCYTIC, NORMOCHROMIC     METABOLIC PANEL, BASIC    Collection Time: 02/10/22  1:03 AM   Result Value Ref Range    Sodium 142 136 - 145 mmol/L    Potassium 4.1 3.5 - 5.1 mmol/L    Chloride 112 (H) 97 - 108 mmol/L    CO2 21 21 - 32 mmol/L    Anion gap 9 5 - 15 mmol/L    Glucose 156 (H) 65 - 100 mg/dL    BUN 37 (H) 6 - 20 MG/DL    Creatinine 2.26 (H) 0.70 - 1.30 MG/DL    BUN/Creatinine ratio 16 12 - 20      GFR est AA 39 (L) >60 ml/min/1.73m2    GFR est non-AA 32 (L) >60 ml/min/1.73m2    Calcium 9.0 8.5 - 10.1 MG/DL   GLUCOSE, POC    Collection Time: 02/10/22  8:36 AM   Result Value Ref Range    Glucose (POC) 98 65 - 117 mg/dL    Performed by Lorena Noriega (RN)    GLUCOSE, POC    Collection Time: 02/10/22 12:19 PM   Result Value Ref Range    Glucose (POC) 102 65 - 117 mg/dL    Performed by Michael TORIBIO    GLUCOSE, POC    Collection Time: 02/10/22  3:45 PM   Result Value Ref Range    Glucose (POC) 69 65 - 117 mg/dL    Performed by Michael Day PCT        Microbiology Data: In HPI        Assessment:   45 yo M with:    - Complicated UTI in the setting of urinary obstruction caused by enlarged prostate leading to severe right hydronephrosis.    - Beard in place for decompression, urology following.   - FELECIA due to above - improving  - Lactose fermenting GNRs on urine cultures  - Uncontrolled DM1 with multiple DKA episodes  - Hematemesis POA - assessed to be likely from Lillie-Estevez tears. Now stable. GI following.  - Covid positive, vaccinated. - Hx of bipolar, seizure d/o. Recommendations:  - Contnue zosyn. - F/u urine cultures. - Plan for around 10-14 days of abx therapy, to be decided. Will follow. Thank for the opportunity to participate in the care of this patient. Please contact with questions or concerns.            Juan Miguel Barnard MD  Infectious Diseases

## 2022-02-10 NOTE — DIABETES MGMT
[address] [place] [facility] NURSE SPECIALIST CONSULT     Initial Presentation   Priscilla Mcfadden is a 46 y.o. male admitted 2/8/22 with weakness, SOB,  nausea and vomiting x 3 days. The patient became concerned when emesis became bloody. He called EMS. The reports last taking insulin 3 days ago and when monitoring BG's the metered just read \"HI\". Admission glucose 911. A1C 13 %  Anion Gap. Creatine 2.42. GFR  30. DKA. The patient was found to be Covid positive toady (2/9/22). HX:   Past Medical History:   Diagnosis Date    Bipolar 1 disorder, depressed ([address]     Bipolar disorder ([address]     Depression     Diabetes ([address]     DKA, type 1 ([address] 1/27/2013    diagnosed age 23    H/O noncompliance with medical treatment, presenting hazards to health     MRSA (methicillin resistant staph aureus) culture positive     MRSA (methicillin resistant Staphylococcus aureus)     Face    Noncompliance with medication regimen     Second hand smoke exposure     Seizure ([address]     Seizures ([address] 2006 or 2007    one episode during senior care        INITIAL DX:   DKA (diabetic ketoacidosis) ([address] [E11.10]     Current Treatment     TX: ABx. Insulin. Crestor. GI prophylaxis. Consulted by Provider for advanced diabetes nursing assessment and care for:   [x] Transitioning off Jalaceasar Odom   [x] Inpatient management strategy  [] Home management assessment  [] Survival skill education    Hospital Course   Clinical progress has been complicated by DKA.   4/3/16 Patient schedule for upper endoscopy today per GI ( tested positive for Covid today)  2/10/22 He remains symptomatic from COVID infection, hold off on EGD unless he has hemodynamically unstable bleeding. Diabetes History   The patient reports a 20 year history of Type 1 diabetes. He has a positive family hx of diabetes (mom & niece). He reports taking 30 units of Lantus daily and 15 ( sliding scale) units of Humalog with meals.  He follows with a PCP,  Jeffrey Peña MD, for diabetes management. Diabetes-related Medical History  Acute complications  DKA  Neurological complications  Peripheral neuropathy  Microvascular disease  Retinopathy    Diabetes Medication History  Key Antihyperglycemic Medications             insulin aspart U-100 (NovoLOG Flexpen U-100 Insulin) 100 unit/mL (3 mL) inpn 8 Units by SubCUTAneous route Before breakfast, lunch, and dinner. (Novolog or Humalog, whichever more preferred: Inject 5 units with each meal + correction insulin, up to 30 units per day    insulin glargine (Lantus Solostar U-100 Insulin) 100 unit/mL (3 mL) inpn INJECT 30 UNITS SUBCUTANEOUSLY DAILY           Taking medications pattern  [x] Consistent administration  [x] Affordable  Social determinants of health impacting diabetes self-management practices   Concerned that you need to know more about how to stay healthy with diabetes  Overall evaluation:    [x] Not achieving A1c target with drug therapy & self-care practices    Subjective   Did not enter; Covid isolation precautions     Objective   Physical exam  General Normal weight male. Conversant and cooperative  Neuro  Alert, oriented   Vital Signs   Visit Vitals  BP (!) 185/102 (BP 1 Location: Right upper arm, BP Patient Position: At rest)   Pulse 94   Temp 97.7 °F (36.5 °C)   Resp 15   Ht 5' 9\" (1.753 m)   Wt 66.2 kg (146 lb)   SpO2 98%   BMI 21.56 kg/m²         Laboratory  Recent Labs     02/10/22  0103 02/09/22  1234 02/09/22  0837 02/09/22  0420 02/09/22  0256 02/08/22  2034 02/08/22  1922   * 136* 86   < >  --    < > 869*   AGAP 9 7 5   < >  --    < > 29*   WBC 16.7*  --   --   --  15.7*  --  11.4*   CREA 2.26* 2.15* 2.28*   < >  --    < > 2.42*   GFRNA 32* 34* 32*   < >  --    < > 30*   AST  --   --   --   --   --   --  7*   ALT  --   --   --   --   --   --  15    < > = values in this interval not displayed.        Factors impacting BG management  Factor Dose Comments Nutrition:  Standard meals      60 carb meal    nausea   some clear emesis   Infection Rocephin 1 g q 24 hrs    Other:   Kidney function        GFR 32     Blood glucose pattern      Significant diabetes-related events over the past 24-72 hours  The patient remains on glucostabilizer. Anion Gap is closed x 2. His current BG is 105. Tested positive for Covid today. Assessment and Plan   Nursing Diagnosis Risk for unstable blood glucose pattern   Nursing Intervention Domain 5251 Decision-making Support   Nursing Interventions Examined current inpatient diabetes/blood glucose control   Explored factors facilitating and impeding inpatient management  Explored corrective strategies with patient and responsible inpatient provider   Informed patient of rational for insulin strategy while hospitalized     Nursing Diagnosis 72885 Ineffective Health Management   Nursing Intervention Domain 525 Decision-makingSupport   Nursing Interventions Identified diabetes self-management practices impeding diabetes control  Discussed diabetes survival skills related to  1. Healthy Plate eating plan; given handouts  2. Role of physical activity in improving insulin sensitivity and action  3. Procedure for blood glucose monitoring & options for low-cost products available from [facility]   4. Medications plan at discharge     Evaluation   This 46year old ,  male patient with Type 1 diabetes did not achieve BG control prior to admission as evidenced by A1C of 13%. He has a hx of DKA. The patient on gluco stabilizer yesterday evening. He has required between 1 - 30 units of insulin per hour since starting glucostabilizer. The patient was taking 30 units Lantus daily and up to 15 units of Humalog with meals PTA. He reports stable BG's at home until 3 days ago, however the A1C suggests BG's have been unstable.  The aptient has a hx of A1c's above goal.  I recommend transitioning off Gluco stabilzer with 20 units of Lantus ( .3 x 66. 2kg). I would like to monitor BG's trends this evening and make recommendation on basal dose and mealtime dose if the patient is eating in the morning. Patient is off gluco stabilzer and BG's are stable. The first dose of 20 units Lantus was given this morning. Diabetes management will continue to monitor BG trends this evening and reevaluate in the morning. This patient would benefit from diabetes self-management education and support DAVID RAMIRE[facility]) after discharge. Recommendations     [] Use of Subcutaneous Insulin Order set (2829)  Insulin Dosing Specific recommendation   Basal                                      (Based on weight, BMI & GFR) []        0.2 units/kg/D  [x] 0.3 units/kg/D  [] 0.4 units/kg/D   Continue 20 Lantus Daily   Nutritional                                      (Based on CHO/dextrose load) [] Normal sensitivity  [] Insulin-resistant sensitivity    Corrective                                       (Useful in adjusting insulin dosing) [x] Normal sensitivity  [] HIGH sensitivity  [] Insulin-resistant sensitivity        [x] Referral to  [x] [facility] (Phone [phone] to schedule appointment) for [facility]    Billing Code(s)   [] 98419 IP subsequent hospital care - 35 minutes [] 16500 Prolonged Services - 65 minutes [] 13252 Prolonged Services - 110 minutes  [] 59607 IP subsequent hospital care - 25 minutes [] 20913 Prolonged Services - 55 minutes [] 43909 Prolonged Services - 100 minutes  [x] 91354 IP subsequent hospital care - 15 minutes [] 62113 Prolonged Services - 45 minutes [] 94814 Prolonged Services - 90 minutes    Before making these care recommendations, I personally reviewed the hospitalization record, including notes, laboratory & diagnostic data and current medications, and examined the patient at the bedside (circumstances permitting) before making care recommendations.  More than fifty (50) percent of the time was spent in patient counseling and/or care coordination.   Total minutes: 75 ANEESH Webster  Diabetes Clinical Nurse Specialist  [facility]  Access via [address] [place] [address]

## 2022-02-11 ENCOUNTER — APPOINTMENT (OUTPATIENT)
Dept: GENERAL RADIOLOGY | Age: 42
DRG: 420 | End: 2022-02-11
Attending: STUDENT IN AN ORGANIZED HEALTH CARE EDUCATION/TRAINING PROGRAM
Payer: COMMERCIAL

## 2022-02-11 LAB
ANION GAP SERPL CALC-SCNC: 4 MMOL/L (ref 5–15)
BACTERIA SPEC CULT: ABNORMAL
BASOPHILS # BLD: 0 K/UL (ref 0–0.1)
BASOPHILS NFR BLD: 0 % (ref 0–1)
BUN SERPL-MCNC: 21 MG/DL (ref 6–20)
BUN/CREAT SERPL: 13 (ref 12–20)
CALCIUM SERPL-MCNC: 7.9 MG/DL (ref 8.5–10.1)
CC UR VC: ABNORMAL
CHLORIDE SERPL-SCNC: 106 MMOL/L (ref 97–108)
CO2 SERPL-SCNC: 24 MMOL/L (ref 21–32)
CREAT SERPL-MCNC: 1.57 MG/DL (ref 0.7–1.3)
DIFFERENTIAL METHOD BLD: ABNORMAL
EOSINOPHIL # BLD: 0.2 K/UL (ref 0–0.4)
EOSINOPHIL NFR BLD: 2 % (ref 0–7)
ERYTHROCYTE [DISTWIDTH] IN BLOOD BY AUTOMATED COUNT: 14.6 % (ref 11.5–14.5)
GLUCOSE BLD STRIP.AUTO-MCNC: 120 MG/DL (ref 65–117)
GLUCOSE BLD STRIP.AUTO-MCNC: 122 MG/DL (ref 65–117)
GLUCOSE BLD STRIP.AUTO-MCNC: 134 MG/DL (ref 65–117)
GLUCOSE BLD STRIP.AUTO-MCNC: 168 MG/DL (ref 65–117)
GLUCOSE BLD STRIP.AUTO-MCNC: 188 MG/DL (ref 65–117)
GLUCOSE SERPL-MCNC: 59 MG/DL (ref 65–100)
HCT VFR BLD AUTO: 25.7 % (ref 36.6–50.3)
HGB BLD-MCNC: 8.4 G/DL (ref 12.1–17)
IMM GRANULOCYTES # BLD AUTO: 0 K/UL (ref 0–0.04)
IMM GRANULOCYTES NFR BLD AUTO: 0 % (ref 0–0.5)
LYMPHOCYTES # BLD: 2.2 K/UL (ref 0.8–3.5)
LYMPHOCYTES NFR BLD: 24 % (ref 12–49)
MCH RBC QN AUTO: 29.5 PG (ref 26–34)
MCHC RBC AUTO-ENTMCNC: 32.7 G/DL (ref 30–36.5)
MCV RBC AUTO: 90.2 FL (ref 80–99)
MONOCYTES # BLD: 0.7 K/UL (ref 0–1)
MONOCYTES NFR BLD: 7 % (ref 5–13)
NEUTS SEG # BLD: 6 K/UL (ref 1.8–8)
NEUTS SEG NFR BLD: 66 % (ref 32–75)
NRBC # BLD: 0 K/UL (ref 0–0.01)
NRBC BLD-RTO: 0 PER 100 WBC
PLATELET # BLD AUTO: 408 K/UL (ref 150–400)
POTASSIUM SERPL-SCNC: 3.6 MMOL/L (ref 3.5–5.1)
RBC # BLD AUTO: 2.85 M/UL (ref 4.1–5.7)
SERVICE CMNT-IMP: ABNORMAL
SODIUM SERPL-SCNC: 134 MMOL/L (ref 136–145)
WBC # BLD AUTO: 9.1 K/UL (ref 4.1–11.1)

## 2022-02-11 PROCEDURE — 74011250637 HC RX REV CODE- 250/637: Performed by: INTERNAL MEDICINE

## 2022-02-11 PROCEDURE — 80048 BASIC METABOLIC PNL TOTAL CA: CPT

## 2022-02-11 PROCEDURE — 73502 X-RAY EXAM HIP UNI 2-3 VIEWS: CPT

## 2022-02-11 PROCEDURE — 74011250637 HC RX REV CODE- 250/637: Performed by: STUDENT IN AN ORGANIZED HEALTH CARE EDUCATION/TRAINING PROGRAM

## 2022-02-11 PROCEDURE — 74011250636 HC RX REV CODE- 250/636: Performed by: INTERNAL MEDICINE

## 2022-02-11 PROCEDURE — 74011250637 HC RX REV CODE- 250/637: Performed by: PHYSICIAN ASSISTANT

## 2022-02-11 PROCEDURE — 74011636637 HC RX REV CODE- 636/637: Performed by: STUDENT IN AN ORGANIZED HEALTH CARE EDUCATION/TRAINING PROGRAM

## 2022-02-11 PROCEDURE — 65660000000 HC RM CCU STEPDOWN

## 2022-02-11 PROCEDURE — 85025 COMPLETE CBC W/AUTO DIFF WBC: CPT

## 2022-02-11 PROCEDURE — 74011250636 HC RX REV CODE- 250/636: Performed by: STUDENT IN AN ORGANIZED HEALTH CARE EDUCATION/TRAINING PROGRAM

## 2022-02-11 PROCEDURE — 74011000250 HC RX REV CODE- 250: Performed by: INTERNAL MEDICINE

## 2022-02-11 PROCEDURE — 82962 GLUCOSE BLOOD TEST: CPT

## 2022-02-11 PROCEDURE — 74011000258 HC RX REV CODE- 258: Performed by: STUDENT IN AN ORGANIZED HEALTH CARE EDUCATION/TRAINING PROGRAM

## 2022-02-11 PROCEDURE — C9113 INJ PANTOPRAZOLE SODIUM, VIA: HCPCS | Performed by: INTERNAL MEDICINE

## 2022-02-11 PROCEDURE — 36415 COLL VENOUS BLD VENIPUNCTURE: CPT

## 2022-02-11 PROCEDURE — 74011250636 HC RX REV CODE- 250/636: Performed by: PHYSICIAN ASSISTANT

## 2022-02-11 RX ADMIN — PIPERACILLIN AND TAZOBACTAM 3.38 G: 3; .375 INJECTION, POWDER, LYOPHILIZED, FOR SOLUTION INTRAVENOUS at 21:43

## 2022-02-11 RX ADMIN — MORPHINE SULFATE 2 MG: 2 INJECTION, SOLUTION INTRAMUSCULAR; INTRAVENOUS at 21:42

## 2022-02-11 RX ADMIN — METOCLOPRAMIDE HYDROCHLORIDE 5 MG: 5 INJECTION INTRAMUSCULAR; INTRAVENOUS at 02:31

## 2022-02-11 RX ADMIN — SUCRALFATE 1 G: 1 TABLET ORAL at 16:24

## 2022-02-11 RX ADMIN — METOCLOPRAMIDE HYDROCHLORIDE 5 MG: 5 INJECTION INTRAMUSCULAR; INTRAVENOUS at 21:42

## 2022-02-11 RX ADMIN — PIPERACILLIN AND TAZOBACTAM 3.38 G: 3; .375 INJECTION, POWDER, LYOPHILIZED, FOR SOLUTION INTRAVENOUS at 05:29

## 2022-02-11 RX ADMIN — Medication 2 UNITS: at 16:24

## 2022-02-11 RX ADMIN — SUCRALFATE 1 G: 1 TABLET ORAL at 12:40

## 2022-02-11 RX ADMIN — CARVEDILOL 12.5 MG: 12.5 TABLET, FILM COATED ORAL at 08:34

## 2022-02-11 RX ADMIN — SUCRALFATE 1 G: 1 TABLET ORAL at 08:34

## 2022-02-11 RX ADMIN — MORPHINE SULFATE 2 MG: 2 INJECTION, SOLUTION INTRAMUSCULAR; INTRAVENOUS at 08:34

## 2022-02-11 RX ADMIN — MORPHINE SULFATE 2 MG: 2 INJECTION, SOLUTION INTRAMUSCULAR; INTRAVENOUS at 02:30

## 2022-02-11 RX ADMIN — CARVEDILOL 12.5 MG: 12.5 TABLET, FILM COATED ORAL at 16:24

## 2022-02-11 RX ADMIN — SUCRALFATE 1 G: 1 TABLET ORAL at 21:42

## 2022-02-11 RX ADMIN — PIPERACILLIN AND TAZOBACTAM 3.38 G: 3; .375 INJECTION, POWDER, LYOPHILIZED, FOR SOLUTION INTRAVENOUS at 14:51

## 2022-02-11 RX ADMIN — INSULIN GLARGINE 10 UNITS: 100 INJECTION, SOLUTION SUBCUTANEOUS at 08:33

## 2022-02-11 RX ADMIN — SODIUM CHLORIDE 40 MG: 9 INJECTION INTRAMUSCULAR; INTRAVENOUS; SUBCUTANEOUS at 08:34

## 2022-02-11 RX ADMIN — FINASTERIDE 5 MG: 5 TABLET, FILM COATED ORAL at 08:34

## 2022-02-11 RX ADMIN — MORPHINE SULFATE 2 MG: 2 INJECTION, SOLUTION INTRAMUSCULAR; INTRAVENOUS at 12:43

## 2022-02-11 RX ADMIN — METOCLOPRAMIDE HYDROCHLORIDE 5 MG: 5 INJECTION INTRAMUSCULAR; INTRAVENOUS at 08:34

## 2022-02-11 RX ADMIN — SODIUM CHLORIDE 40 MG: 9 INJECTION INTRAMUSCULAR; INTRAVENOUS; SUBCUTANEOUS at 21:42

## 2022-02-11 RX ADMIN — METOCLOPRAMIDE HYDROCHLORIDE 5 MG: 5 INJECTION INTRAMUSCULAR; INTRAVENOUS at 14:51

## 2022-02-11 RX ADMIN — MORPHINE SULFATE 2 MG: 2 INJECTION, SOLUTION INTRAMUSCULAR; INTRAVENOUS at 16:24

## 2022-02-11 RX ADMIN — ROSUVASTATIN CALCIUM 40 MG: 40 TABLET, FILM COATED ORAL at 21:42

## 2022-02-11 RX ADMIN — AMLODIPINE BESYLATE 5 MG: 5 TABLET ORAL at 08:34

## 2022-02-11 NOTE — PROGRESS NOTES
0700: Bedside shift change report given to Peri Hatfield RN (oncoming nurse) by ASAD Duckworth (offgoing nurse). Report included the following information SBAR, Kardex, Intake/Output, MAR, Recent Results and Cardiac Rhythm NSR.     1900: End of Shift Note    Bedside shift change report given to Bristow John (oncoming nurse) by Cl Whiteside RN (offgoing nurse). Report included the following information SBAR, Kardex, Intake/Output, MAR, Recent Results and Cardiac Rhythm NSR    Shift worked:  7a-7p     Shift summary and any significant changes:    New scrotum swelling- MD notified    PRN morphine given x3 for R leg/hip pain    Diet changed to GI lite    XRAY R hip/leg was negative for acute abnormalities    Possible CT with contrast on Monday if creatinine is back to baseline per Urology   Concerns for physician to address: See above   Zone phone for oncoming shift:  [phone]     Activity:  Activity Level: Up with Assistance  Number times ambulated in hallways past shift: 0  Number of times OOB to chair past shift: 0    Cardiac:   Cardiac Monitoring: Yes      Cardiac Rhythm: Sinus Rhythm    Access:   Current line(s): PIV     Genitourinary:   Urinary status: hansen    Respiratory:   O2 Device: None (Room air)  Chronic home O2 use?: NO  Incentive spirometer at bedside: NO     GI:  Last Bowel Movement Date: 02/08/22  Current diet:  DIET ONE TIME MESSAGE  ADULT DIET Regular; GI Barnes (GERD/Peptic Ulcer); dislikes chicken breast  Passing flatus: YES  Tolerating current diet: YES       Pain Management:   Patient states pain is manageable on current regimen: YES    Skin:  Corey Score: 18  Interventions: float heels, increase time out of bed and internal/external urinary devices    Patient Safety:  Fall Score:  Total Score: 3  Interventions: bed/chair alarm, gripper socks, pt to call before getting OOB and stay with me (per policy)  High Fall Risk: Yes    Length of Stay:  Expected LOS: 3d 19h  Actual LOS: [address] Reji RN

## 2022-02-11 NOTE — PROGRESS NOTES
Hospitalist Progress Note    NAME: Yesica Stoddard   :  1980   MRN:  [id number]       Assessment / Plan:    Diabetes mellitus type 1 with DKA POA  -A1c 13.   -Home regimen: lantus 30 units and sliding scale insulin. Likely precipitated by UTI  -Presents with N/V, abdominal pain. - with HCO3 11, AG 29, venous pH 7.25 with pCO2 of 21 on admission  -S/p IV fluid and insulin GTT per DKA protocol  -Reduce Lantus to 10 units. Continue SSI       Urinary retention and severe right hydronephrosis   FELECIA likely secondary to above  Hx of urinary retention and severe right hydronephrosis in 21  BPH  -Baseline creatinine appears to be about 1.2  -Cr 2.3 >> 2.6 >> 1.5  -Beard placed on  for urinary retention. Patient was within 1000 cc  -Retroperitoneal ultrasound showed severe right hydronephrosis, evidence of medical renal disease, and markedly distended bladder  -Urology consulted. Started on Proscar. Follow BMP  -Will likely need outpatient urodynamic study to r/o neurogenic bladder     Complicated Klebsiella pneumoniae UTI -POA  -Suprapubic pain.  UA > 100 WBC, 0-5 RBC, 4+ bacteria  -Urine culture grew Klebsiella pneumoniae  -Was initially on empiric ceftriaxone. Antibiotics broadened to Zosyn on 2/10 due to WBC trending up and creatinine not improving. -WBC and creatinine improving today  -ID and urology on board, appreciate input     Nausea/vomiting with abdominal pain POA, likely due to DKA -resolved  ? GI bleed with reported hematemesis POA  Pt reports onset of vomiting then 2 episodes of red blood              Sounds clinically like mague-davidson tear  Denies NSAID use. No further bleeding or vomiting since arriving on floor  Hb dropped to 8.4 today. No reported active bleeding or vomiting. Continue IV PPI and Carafate  GI on board screening Covid test positive.   Elective endoscopy once he is out of quarantine per GI as patient hemodynamically stable and Hb relatively stable.     COVID-19 infection  -Preprocedure screen Covid positive. -Vaccinated  -Asymptomatic apart from occasional cough. SPO2 98% on room air  -Supportive management    Essential HTN POA  Hyperlipidemia POA  Continue statin  Hold home lisinopril due to FELECIA. Started on p.o. amlodipine. PRN hydralazine    Right hip pain  -We will obtain x-ray    18.5 - 24.9 Normal weight / Body mass index is 21.56 kg/m². Estimated discharge date: 2/12  Barriers:    DVT prophylaxis with SCDs till GI bleed ruled out     Code status: Full code  NOK: mother     Subjective:     Chief Complaint / Reason for Physician Visit   Discussed with RN events overnight. No acute events overnight  No acute events overnight  Complains of right hip pain that has been going on for a couple of months now    Review of Systems:  Symptom Y/N Comments  Symptom Y/N Comments   Fever/Chills    Chest Pain     Poor Appetite    Edema     Cough    Abdominal Pain     Sputum    Joint Pain     SOB/JOHNSTON    Pruritis/Rash     Nausea/vomit    Tolerating PT/OT     Diarrhea    Tolerating Diet     Constipation    Other       Could NOT obtain due to:      Objective:     VITALS:   Last 24hrs VS reviewed since prior progress note. Most recent are:  Patient Vitals for the past 24 hrs:   Temp Pulse Resp BP SpO2   02/11/22 1207 98.1 °F (36.7 °C) 79 12 (!) 148/97 98 %   02/11/22 0833 -- 76 -- 136/86 --   02/11/22 0802 97.8 °F (36.6 °C) 86 18 (!) 155/88 96 %   02/11/22 0232 97.6 °F (36.4 °C) 84 10 133/79 96 %   02/10/22 2258 97.8 °F (36.6 °C) 81 20 117/81 96 %   02/10/22 1949 97.7 °F (36.5 °C) 84 20 107/66 96 %   02/10/22 1546 98.2 °F (36.8 °C) 90 16 121/78 98 %       Intake/Output Summary (Last 24 hours) at 2/11/2022 1340  Last data filed at 2/11/2022 7231  Gross per 24 hour   Intake 240 ml   Output 1700 ml   Net -1460 ml        I had a face to face encounter and independently examined this patient on 2/11/2022, as outlined below:  PHYSICAL EXAM:  General: WD, WN.  Alert, cooperative, no acute distress    EENT:  EOMI. Anicteric sclerae. MMM  Resp:  CTA bilaterally, no wheezing or rales. No accessory muscle use  CV:  Regular  rhythm,  No edema  GI:  Soft, Non distended, Non tender. +Bowel sounds  Neurologic:  Alert and oriented X 3, normal speech,   Psych:   Good insight. Not anxious nor agitated  Skin:  No rashes. No jaundice    Reviewed most current lab test results and cultures  YES  Reviewed most current radiology test results   YES  Review and summation of old records today    NO  Reviewed patient's current orders and MAR    YES  PMH/SH reviewed - no change compared to H&P  ________________________________________________________________________  Care Plan discussed with:    Comments   Patient x    Family      RN x    Care Manager     Consultant                        Multidiciplinary team rounds were held today with , nursing, pharmacist and clinical coordinator. Patient's plan of care was discussed; medications were reviewed and discharge planning was addressed. ________________________________________________________________________  Total NON critical care TIME:  25   Minutes    Total CRITICAL CARE TIME Spent:   Minutes non procedure based      Comments   >50% of visit spent in counseling and coordination of care x    ________________________________________________________________________  Shantel Daniels MD     Procedures: see electronic medical records for all procedures/Xrays and details which were not copied into this note but were reviewed prior to creation of Plan. LABS:  I reviewed today's most current labs and imaging studies.   Pertinent labs include:  Recent Labs     02/11/22  0254 02/10/22  0103 02/09/22  0256   WBC 9.1 16.7* 15.7*   HGB 8.4* 10.4* 11.1*   HCT 25.7* 31.4* 34.1*   * 629* 474*     Recent Labs     02/11/22  0254 02/10/22  0103 02/09/22  1234 02/09/22  5789 02/09/22  1043 02/09/22  0420 02/09/22  6928 02/08/22 2238 02/08/22 2034 02/08/22 1922 02/08/22 1922   * 142 143   < > 146*   < > 144   < > 130*   < > 128*   K 3.6 4.1 4.6   < > 4.2   < > 3.8   < > 5.0   < > 5.0    112* 114*   < > 115*   < > 112*   < > 92*   < > 88*   CO2 24 21 22   < > 26   < > 18*   < > 10*   < > 11*   GLU 59* 156* 136*   < > 86   < > 239*   < > 911*   < > 869*   BUN 21* 37* 40*   < > 42*   < > 43*   < > 43*   < > 43*   CREA 1.57* 2.26* 2.15*   < > 2.28*   < > 2.61*   < > 2.30*   < > 2.42*   CA 7.9* 9.0 8.1*   < > 8.5   < > 8.4*   < > 8.5   < > 9.4   MG  --   --  2.4  --  2.5*  --  2.5*   < > 2.7*  --   --    PHOS  --   --   --   --   --   --   --   --  8.5*  --   --    ALB  --   --   --   --   --   --   --   --   --   --  1.5*   TBILI  --   --   --   --   --   --   --   --   --   --  0.5   ALT  --   --   --   --   --   --   --   --   --   --  15   INR  --   --   --   --   --   --   --   --   --   --  1.0    < > = values in this interval not displayed.        Signed: Marci Patrick MD

## 2022-02-11 NOTE — PROGRESS NOTES
Progress Note    Patient: Pauline Mendez MRN: [id number]  SSN: [id number]    YOB: 1980  Age: 46 y.o. Sex: male        ADMITTED:  2022 to [facility]  for DKA (diabetic ketoacidosis) [facility]) [E11.10]         Pauline Mendez was admitted for DKA (diabetic ketoacidosis) ([facility] [address] [E11.10]. Urology following for right hydronephrosis seen on ELICEO with FELECIA and 1 liter urinary retention now s/p hansen placement with creat greatly improving. Good UOP. Pt covid +. Urine cx GNR    FELECIA 2.3->1. 57. baseline 1.2    Leukocytosis improved, hgb dropped to 8.4    Previous admission with hydronephrosis and urinary retention. GI following for coffee ground emesis       Vitals:  Temp (24hrs), Av.9 °F (36.6 °C), Min:97.6 °F (36.4 °C), Max:98.4 °F (36.9 °C)     Blood pressure 136/86, pulse 76, temperature 97.8 °F (36.6 °C), resp. rate 18, height 5' 9\" (1.753 m), weight 66.2 kg (146 lb), SpO2 96 %. I&O's:   1901 -  0700  In: -   Out: 2550 [Urine:2550]   No intake/output data recorded. Labs:   Recent Labs     22  0254 02/10/22  0103 22  0256   WBC 9.1 16.7* 15.7*   HGB 8.4* 10.4* 11.1*   HCT 25.7* 31.4* 34.1*   * 629* 474*     Recent Labs     22  0254 02/10/22  0103 22  1234   * 142 143   K 3.6 4.1 4.6    112* 114*   CO2 24 21 22   GLU 59* 156* 136*   BUN 21* 37* 40*   CREA 1.57* 2.26* 2.15*   CA 7.9* 9.0 8.1*        Cultures:      Imaging:       Assessment:     - Active Problems:    DKA (diabetic ketoacidosis) ([facility] [address] (2021)      Coffee ground emesis (2022)      COVID-19 (2022)    right hydronephrosis in setting of distended bladder now decompressed with hansen     GNR urine cx    FELECIA-improving     Plan:     - creat improving, continue daily labs. Ideally want CT with contrast to assess the unilateral hydronephrosis when creat back to baseline. Plan for Monday.  Suspect chronic component to the right sided hydronephrosis. Again with improving renal function and symptomatic covid infection would hesitate for inpatient surgical intervention however we will reassess on Monday.  Call if needed over the weekend if renal function would worsen   -continue hansen and proscar  -abx per primary team and ID  -document accurate I/Os    supervising MD, Dr. Gogo Jain By: Zuly Peterson NP - February 11, 2022

## 2022-02-11 NOTE — PROGRESS NOTES
End of Shift Note    Bedside shift change report given to Harleen Martel RN (oncoming nurse) by Fortino Stewart RN (offgoing nurse). Report included the following information SBAR, Kardex, ED Summary, Intake/Output, MAR, Recent Results, Med Rec Status and Cardiac Rhythm Sinus Rhythm    Shift worked:  7p-7a     Shift summary and any significant changes:     gave morphine X3 times, BS low in AM labs bc pt was not eating     Concerns for physician to address: Pt asked if he could go back to liquid diet? Zone phone for oncoming shift:          Activity:  Activity Level: Up with Assistance  Number times ambulated in hallways past shift: 0  Number of times OOB to chair past shift: 0    Cardiac:   Cardiac Monitoring: Yes      Cardiac Rhythm: Sinus Rhythm    Access:   Current line(s): PIV     Genitourinary:   Urinary status: hansen    Respiratory:   O2 Device: None (Room air)  Chronic home O2 use?: NO  Incentive spirometer at bedside: NO     GI:  Last Bowel Movement Date: 02/08/22  Current diet:  ADULT DIET Regular; 4 carb choices (60 gm/meal)  DIET ONE TIME MESSAGE  Passing flatus: YES  Tolerating current diet: YES       Pain Management:   Patient states pain is manageable on current regimen: YES    Skin:  Corey Score: 18  Interventions: float heels, increase time out of bed and limit briefs    Patient Safety:  Fall Score:  Total Score: 3  Interventions: bed/chair alarm, gripper socks, pt to call before getting OOB and stay with me (per policy)  High Fall Risk: Yes    Length of Stay:  Expected LOS: 3d 19h  Actual LOS: [address], RN

## 2022-02-11 NOTE — PROGRESS NOTES
Transition of Care Plan:     RUR: 21% - high   Disposition: Home with family assistance   Follow up appointments: PCP and specialist as indicated   DME needed: TBD  Transportation at Discharge: Mother vs medicaid transport   Darryle Loft or means to access home: Pt's mother has      IM Medicare Letter: n/a - medicaid   Is patient a BCPI-A Bundle: If yes, was Bundle Letter given?:    Is patient a  and connected with the [place]? No                If yes, was [employer] transfer form completed and [facility] notified? Caregiver Contact: Mother - Otf Olmos - [phone] (phone number is out of service) OR Friend - Nohemi Boo - [phone] Hopwooddavid Paz is pt's child's mother - was not aware pt was in hospital, could not verify pt's mother's phone number). Discharge Caregiver contacted prior to discharge? To be contacted  Care Conference needed?: Not indicated at this time     Initial note:  Chart reviewed. Pt not medically stable for d/c.  CM to continue to monitor for d/c needs.     Merline Muff, MSN  Care Manager  [phone]

## 2022-02-11 NOTE — PROGRESS NOTES
Problem: Falls - Risk of  Goal: *Absence of Falls  Description: Document Lisa Jj Fall Risk and appropriate interventions in the flowsheet. Outcome: Progressing Towards Goal  Note: Fall Risk Interventions:  Mobility Interventions: Assess mobility with egress test,Bed/chair exit alarm,Communicate number of staff needed for ambulation/transfer,Patient to call before getting OOB         Medication Interventions: Bed/chair exit alarm,Patient to call before getting OOB,Teach patient to arise slowly    Elimination Interventions: Bed/chair exit alarm,Call light in reach,Patient to call for help with toileting needs,Stay With Me (per policy),Toilet paper/wipes in reach,Toileting schedule/hourly rounds              Problem: Patient Education: Go to Patient Education Activity  Goal: Patient/Family Education  Outcome: Progressing Towards Goal     Problem: Diabetes Self-Management  Goal: *Disease process and treatment process  Description: Define diabetes and identify own type of diabetes; list 3 options for treating diabetes. Outcome: Progressing Towards Goal  Goal: *Incorporating nutritional management into lifestyle  Description: Describe effect of type, amount and timing of food on blood glucose; list 3 methods for planning meals. Outcome: Progressing Towards Goal  Goal: *Incorporating physical activity into lifestyle  Description: State effect of exercise on blood glucose levels. Outcome: Progressing Towards Goal  Goal: *Developing strategies to promote health/change behavior  Description: Define the ABC's of diabetes; identify appropriate screenings, schedule and personal plan for screenings. Outcome: Progressing Towards Goal  Goal: *Using medications safely  Description: State effect of diabetes medications on diabetes; name diabetes medication taking, action and side effects.   Outcome: Progressing Towards Goal  Goal: *Monitoring blood glucose, interpreting and using results  Description: Identify recommended blood glucose targets  and personal targets. Outcome: Progressing Towards Goal  Goal: *Prevention, detection, treatment of acute complications  Description: List symptoms of hyper- and hypoglycemia; describe how to treat low blood sugar and actions for lowering  high blood glucose level. Outcome: Progressing Towards Goal  Goal: *Prevention, detection and treatment of chronic complications  Description: Define the natural course of diabetes and describe the relationship of blood glucose levels to long term complications of diabetes. Outcome: Progressing Towards Goal  Goal: *Developing strategies to address psychosocial issues  Description: Describe feelings about living with diabetes; identify support needed and support network  Outcome: Progressing Towards Goal  Goal: *Insulin pump training  Outcome: Progressing Towards Goal  Goal: *Sick day guidelines  Outcome: Progressing Towards Goal  Goal: *Patient Specific Goal (EDIT GOAL, INSERT TEXT)  Outcome: Progressing Towards Goal     Problem: Patient Education: Go to Patient Education Activity  Goal: Patient/Family Education  Outcome: Progressing Towards Goal     Problem: Airway Clearance - Ineffective  Goal: Achieve or maintain patent airway  Outcome: Progressing Towards Goal     Problem: Gas Exchange - Impaired  Goal: Absence of hypoxia  Outcome: Progressing Towards Goal  Goal: Promote optimal lung function  Outcome: Progressing Towards Goal     Problem: Breathing Pattern - Ineffective  Goal: Ability to achieve and maintain a regular respiratory rate  Outcome: Progressing Towards Goal     Problem:  Body Temperature -  Risk of, Imbalanced  Goal: Ability to maintain a body temperature within defined limits  Outcome: Progressing Towards Goal  Goal: Will regain or maintain usual level of consciousness  Outcome: Progressing Towards Goal  Goal: Complications related to the disease process, condition or treatment will be avoided or minimized  Outcome: Progressing Towards Goal Problem: Isolation Precautions - Risk of Spread of Infection  Goal: Prevent transmission of infectious organism to others  Outcome: Progressing Towards Goal     Problem: Nutrition Deficits  Goal: Optimize nutrtional status  Outcome: Progressing Towards Goal     Problem: Risk for Fluid Volume Deficit  Goal: Maintain normal heart rhythm  Outcome: Progressing Towards Goal  Goal: Maintain absence of muscle cramping  Outcome: Progressing Towards Goal  Goal: Maintain normal serum potassium, sodium, calcium, phosphorus, and pH  Outcome: Progressing Towards Goal     Problem: Loneliness or Risk for Loneliness  Goal: Demonstrate positive use of time alone when socialization is not possible  Outcome: Progressing Towards Goal     Problem: Fatigue  Goal: Verbalize increase energy and improved vitality  Outcome: Progressing Towards Goal     Problem: Patient Education: Go to Patient Education Activity  Goal: Patient/Family Education  Outcome: Progressing Towards Goal     Problem: Infection - Risk of, Urinary Catheter-Associated Urinary Tract Infection  Goal: *Absence of infection signs and symptoms  Outcome: Progressing Towards Goal     Problem: Patient Education: Go to Patient Education Activity  Goal: Patient/Family Education  Outcome: Progressing Towards Goal     Problem: Pressure Injury - Risk of  Goal: *Prevention of pressure injury  Description: Document Corey Scale and appropriate interventions in the flowsheet.   Outcome: Progressing Towards Goal     Problem: Patient Education: Go to Patient Education Activity  Goal: Patient/Family Education  Outcome: Progressing Towards Goal

## 2022-02-11 NOTE — DIABETES MGMT
[address] [facility] NURSE SPECIALIST CONSULT     Initial Presentation   Yesica Stoddard is a 46 y.o. male admitted 2/8/22 with weakness, SOB,  nausea and vomiting x 3 days. The patient became concerned when emesis became bloody. He called EMS. The reports last taking insulin 3 days ago and when monitoring BG's the metered just read \"HI\". Admission glucose 911. A1C 13 %  Anion Gap. Creatine 2.42. GFR  30. DKA. The patient was found to be Covid positive toady (2/9/22). HX:   Past Medical History:   Diagnosis Date    Bipolar 1 disorder, depressed ([address]     Bipolar disorder ([address]     Depression     Diabetes ([address]     DKA, type 1 ([address] 1/27/2013    diagnosed age 23    H/O noncompliance with medical treatment, presenting hazards to health     MRSA (methicillin resistant staph aureus) culture positive     MRSA (methicillin resistant Staphylococcus aureus)     Face    Noncompliance with medication regimen     Second hand smoke exposure     Seizure ([address]     Seizures ([address] 2006 or 2007    one episode during senior care        INITIAL DX:   DKA (diabetic ketoacidosis) ([address] [E11.10]     Current Treatment     TX: ABx. Insulin. Crestor. GI prophylaxis. Consulted by Provider for advanced diabetes nursing assessment and care for:   [x] Transitioning off Julious Radon   [x] Inpatient management strategy  [] Home management assessment  [] Survival skill education    Hospital Course   Clinical progress has been complicated by DKA.   8/5/12 Patient schedule for upper endoscopy today per GI ( tested positive for Covid today)  2/10/22 He remains symptomatic from COVID infection, hold off on EGD unless he has hemodynamically unstable bleeding. 2/11/22 GI and urology continues to follow this patient. He is eating some, but prefers a liquid or bland diet. Diet order to be changed today. Diabetes History   The patient reports a 20 year history of Type 1 diabetes.  He has a positive family hx of diabetes (mom & niece). He reports taking 30 units of Lantus daily and 15 ( sliding scale) units of Humalog with meals. He follows with a PCP,  Ana Haile MD, for diabetes management. Diabetes-related Medical History  Acute complications  DKA  Neurological complications  Peripheral neuropathy  Microvascular disease  Retinopathy    Diabetes Medication History  Key Antihyperglycemic Medications             insulin aspart U-100 (NovoLOG Flexpen U-100 Insulin) 100 unit/mL (3 mL) inpn 8 Units by SubCUTAneous route Before breakfast, lunch, and dinner. (Novolog or Humalog, whichever more preferred: Inject 5 units with each meal + correction insulin, up to 30 units per day    insulin glargine (Lantus Solostar U-100 Insulin) 100 unit/mL (3 mL) inpn INJECT 30 UNITS SUBCUTANEOUSLY DAILY           Taking medications pattern  [x] Consistent administration  [x] Affordable  Social determinants of health impacting diabetes self-management practices   Concerned that you need to know more about how to stay healthy with diabetes  Overall evaluation:    [x] Not achieving A1c target with drug therapy & self-care practices    Subjective   Did not enter; Covid isolation precautions     Objective   Physical exam  General Normal weight male. Conversant and cooperative  Neuro  Alert, oriented   Vital Signs   Visit Vitals  /86   Pulse 76   Temp 97.8 °F (36.6 °C)   Resp 18   Ht 5' 9\" (1.753 m)   Wt 66.2 kg (146 lb)   SpO2 96%   BMI 21.56 kg/m²         Laboratory  Recent Labs     02/11/22  0254 02/10/22  0103 02/09/22  1234 02/09/22  0420 02/09/22  0256 02/08/22  2034 02/08/22  1922   GLU 59* 156* 136*   < >  --    < > 869*   AGAP 4* 9 7   < >  --    < > 29*   WBC 9.1 16.7*  --   --  15.7*   < > 11.4*   CREA 1.57* 2.26* 2.15*   < >  --    < > 2.42*   GFRNA 49* 32* 34*   < >  --    < > 30*   AST  --   --   --   --   --   --  7*   ALT  --   --   --   --   --   --  15    < > = values in this interval not displayed. Factors impacting BG management  Factor Dose Comments   Nutrition:  Standard meals      GI bland    poor appetite   Infection Zosyn 3.375 g q 8hrs    Other:   Kidney function        GFR 49 ( improving)     Blood glucose pattern      Significant diabetes-related events over the past 24-72 hours  The patient remains on glucostabilizer. Anion Gap is closed x 2. His current BG is 105. Tested positive for Covid today. 2/11/22 had a hypoglycemic episode in the early morning, ate very little dinner. Lantus dose reduced. Current BG is 120. Assessment and Cecilio   Nursing Diagnosis Risk for unstable blood glucose pattern   Nursing Intervention Domain 5250 Decision-making Support   Nursing Interventions Examined current inpatient diabetes/blood glucose control   Explored factors facilitating and impeding inpatient management  Explored corrective strategies with patient and responsible inpatient provider   Informed patient of rational for insulin strategy while hospitalized     Nursing Diagnosis 22181 Ineffective Health Management   Nursing Intervention Domain 12 Decision-makingSupport   Nursing Interventions Identified diabetes self-management practices impeding diabetes control  Discussed diabetes survival skills related to  1. Healthy Plate eating plan; given handouts  2. Role of physical activity in improving insulin sensitivity and action  3. Procedure for blood glucose monitoring & options for low-cost products available from [facility]   4. Medications plan at discharge     Evaluation   This 46year old ,  male patient with Type 1 diabetes did not achieve BG control prior to admission as evidenced by A1C of 13%. He has a hx of DKA. The patient on gluco stabilizer yesterday evening. He has required between 1 - 30 units of insulin per hour since starting glucostabilizer. The patient was taking 30 units Lantus daily and up to 15 units of Humalog with meals PTA.  He reports stable BG's at home until 3 days ago, however the A1C suggests BG's have been unstable. The aptient has a hx of A1c's above goal.  I recommend transitioning off Gluco stabilzer with 20 units of Lantus ( .3 x 66.2kg). I would like to monitor BG's trends this evening and make recommendation on basal dose and mealtime dose if the patient is eating in the morning.    2/11/22 Patient had a hypoglycemic episode in the early morning. The patient ate very little yesterday. He has been changed to GI bland diet at his request and is 1-25% of meals today. The patient's Lantus dose has been reduced to 10 units of Lantus daily and his BG's are now in the 120's. I suspect that this dosing may not be enough to cover basal needs. I recommend starting low at 0.2 and giving 14 units Lantus daily. This patient would benefit from diabetes self-management education and support [facility]) after discharge.     Recommendations     [] Use of Subcutaneous Insulin Order set (6048)  Insulin Dosing Specific recommendation   Basal                                      (Based on weight, BMI & GFR) [x]        0.2 units/kg/D  [] 0.3 units/kg/D  [] 0.4 units/kg/D   Increase to 14 Lantus Daily   Nutritional                                      (Based on CHO/dextrose load) [] Normal sensitivity  [] Insulin-resistant sensitivity    Corrective                                       (Useful in adjusting insulin dosing) [x] Normal sensitivity  [] HIGH sensitivity  [] Insulin-resistant sensitivity        [x] Referral to  [x] [facility] (Phone [phone] to schedule appointment) for [facility]    Billing Code(s)   [] 29697 IP subsequent hospital care - 35 minutes [] 98959 Prolonged Services - 65 minutes [] 06050 Prolonged Services - 110 minutes  [x] 70196 IP subsequent hospital care - 25 minutes [] 85117 Prolonged Services - 55 minutes [] 88976 Prolonged Services - 100 minutes  [] 01374 IP subsequent hospital care - 15 minutes [] 19876 Prolonged Services - 45 minutes [] 24598 Prolonged Services - 90 minutes    Before making these care recommendations, I personally reviewed the hospitalization record, including notes, laboratory & diagnostic data and current medications, and examined the patient at the bedside (circumstances permitting) before making care recommendations. More than fifty (50) percent of the time was spent in patient counseling and/or care coordination.   Total minutes: 25    ANEESH Spivey  Diabetes Clinical Nurse Specialist  [facility]  Access via [address] [place] [address]

## 2022-02-11 NOTE — PROGRESS NOTES
Spiritual Care Assessment/Progress Note  [facility]      NAME: Johann Diaz      MRN: [id number]  AGE: 46 y.o.  SEX: male  Mormon Affiliation: Jain   Language: English     2/11/2022     Total Time (in minutes): 13     Spiritual Assessment begun in [facility]ARD[facility]MON[facility] through conversation with:         [x]Patient        [] Family    [] Friend(s)        Reason for Consult: Initial/Spiritual assessment, patient floor     Spiritual beliefs: (Please include comment if needed)     [x] Identifies with a keerthi tradition:         [] Supported by a keerthi community:            [] Claims no spiritual orientation:           [] Seeking spiritual identity:                [] Adheres to an individual form of spirituality:           [] Not able to assess:                           Identified resources for coping:      [x] Prayer                               [] Music                  [] Guided Imagery     [x] Family/friends                 [] Pet visits     [] Devotional reading                         [] Unknown     [] Other:                                          Interventions offered during this visit: (See comments for more details)    Patient Interventions: Affirmation of emotions/emotional suffering,Affirmation of keerthi,Catharsis/review of pertinent events in supportive environment,Iconic (affirming the presence of God/Higher Power),Initial/Spiritual assessment, patient floor,Prayer (assurance of)           Plan of Care:     [] Support spiritual and/or cultural needs    [] Support AMD and/or advance care planning process      [] Support grieving process   [] Coordinate Rites and/or Rituals    [] Coordination with community clergy   [x] No spiritual needs identified at this time   [] Detailed Plan of Care below (See Comments)  [] Make referral to Music Therapy  [] Make referral to Pet Therapy     [] Make referral to Addiction services  [] Make referral to [facility]  [] Make referral to Spiritual Care Partner  [] No future visits requested        [x] Contact Spiritual Care for further referrals     Comments:   Reviewed chart prior to visit on [facility] unit for spiritual assessment. Patient is on COVID+ contact precautions;  telephoned into room and spoke with patient. His voice sounded strong and hopeful. He sounded relaxed and shared he's doing okay. .. Ilsseth Gull Lisseth Arti Chi just hanging out listening to machines beep. He expressed no worries or concerns and no spiritual distress. He shared he has good support from friends and family. He requested  keep him in prayer. Provided supportive listening presence, offered assurance of prayer and advised of ongoing availability of pastoral support. He expressed appreciation for visit.      NAKITA Cadena, [facility], Staff [address]    [address] Paging Service  [phone])

## 2022-02-11 NOTE — PROGRESS NOTES
Transition of Care Plan:     RUR: 21% - high   Disposition: Home with family assistance   Follow up appointments: PCP and specialist as indicated   DME needed: TBD  Transportation at [facility] vs medicaid transport   [address] or means to access home: Pt's mother has      [place] Medicare Letter: n/a - medicaid   Is patient a BCPI-A Bundle:                      If yes, was Bundle Letter given?:    Is patient a  and connected with the [address]  If yes, was [place] transfer form completed and [place] notified? Caregiver Contact: Mother - Harshil Jensen - [phone] (phone number is out of service) OR Friend - Rudolph Ray - [phone] Purvi Li is pt's child's mother - was not aware pt was in hospital, could not verify pt's mother's phone number).   Discharge Caregiver contacted prior to discharge? To be contacted  Care Conference needed?: Not indicated at this time        Initial note:  Chart reviewed. CM contacted pt to discuss [place] services due to possible d/c with a jade. He has had one before and knows how to care for it. Previously he had it for 1 week and there was no need for supplies. He hope that it will not be in long this time also and declined [place] services at this time. CM notified him that his PCP can assist him with [place] services or supplies if the need arises. CM will continue to monitor for [place] needs.     Reji Holley, MSN  Care Manager  [phone]

## 2022-02-11 NOTE — PROGRESS NOTES
Gastroenterology Progress Note  MANEUL Cotton   for Dr. Abida Nina    2/11/2022    Admit Date: 2/8/2022    Subjective: Follow up for:  1) Coffee ground emesis    2) DKA- Improved    3) COVID-19 infection    4) Nausea and vomiting    5) Mild Anemia    Patient is on regular diet. Feeling better today without any further emesis. Feels reglan is helping. Denies any SE. Pain: Patient complains of abdominal pain no. Bowel Movements: None, no melena or hematochezia    Hgb 8.4 form 10.4  WBC normalized    Current Facility-Administered Medications   Medication Dose Route Frequency    metoclopramide HCl (REGLAN) injection 5 mg  5 mg IntraVENous Q6H    carvediloL (COREG) tablet 12.5 mg  12.5 mg Oral BID WITH MEALS    piperacillin-tazobactam (ZOSYN) 3.375 g in 0.9% sodium chloride (MBP/ADV) 100 mL MBP  3.375 g IntraVENous Q8H    finasteride (PROSCAR) tablet 5 mg  5 mg Oral DAILY    insulin glargine (LANTUS) injection 10 Units  10 Units SubCUTAneous DAILY    rosuvastatin (CRESTOR) tablet 40 mg  40 mg Oral QHS    sucralfate (CARAFATE) tablet 1 g  1 g Oral AC&HS    amLODIPine (NORVASC) tablet 5 mg  5 mg Oral DAILY    hydrALAZINE (APRESOLINE) 20 mg/mL injection 20 mg  20 mg IntraVENous Q6H PRN    insulin lispro (HUMALOG) injection   SubCUTAneous AC&HS    glucose chewable tablet 16 g  4 Tablet Oral PRN    glucagon (GLUCAGEN) injection 1 mg  1 mg IntraMUSCular PRN    acetaminophen (TYLENOL) tablet 650 mg  650 mg Oral Q6H PRN    pantoprazole (PROTONIX) 40 mg in 0.9% sodium chloride 10 mL injection  40 mg IntraVENous Q12H    morphine injection 2 mg  2 mg IntraVENous Q3H PRN    bisacodyL (DULCOLAX) tablet 5 mg  5 mg Oral DAILY PRN    promethazine (PHENERGAN) tablet 12.5 mg  12.5 mg Oral Q6H PRN    Or    ondansetron (ZOFRAN) injection 4 mg  4 mg IntraVENous Q6H PRN        Objective:     Blood pressure (!) 155/88, pulse 86, temperature 97.8 °F (36.6 °C), resp.  rate 18, height 5' 9\" (1.753 m), weight 66.2 kg (146 lb), SpO2 96 %. No intake/output data recorded. 02/09 1901 - 02/11 0700  In: -   Out: 2550 [Urine:2550]    EXAM:   GEN: Thin WM, NAD  HEENT: NCAT  Heart: RRR  Abdomen: soft, ND, NT, normal BS  Ext: no edema    Data Review    Recent Results (from the past 24 hour(s))   GLUCOSE, POC    Collection Time: 02/10/22  8:36 AM   Result Value Ref Range    Glucose (POC) 98 65 - 117 mg/dL    Performed by Alison Hayes (ASAD)    GLUCOSE, POC    Collection Time: 02/10/22 12:19 PM   Result Value Ref Range    Glucose (POC) 102 65 - 117 mg/dL    Performed by Campbellton Mins PCT    GLUCOSE, POC    Collection Time: 02/10/22  3:45 PM   Result Value Ref Range    Glucose (POC) 69 65 - 117 mg/dL    Performed by Harshad Mins PCT    GLUCOSE, POC    Collection Time: 02/10/22  4:13 PM   Result Value Ref Range    Glucose (POC) 70 65 - 117 mg/dL    Performed by Alison Hayes (ASAD)    GLUCOSE, POC    Collection Time: 02/10/22  4:35 PM   Result Value Ref Range    Glucose (POC) 95 65 - 117 mg/dL    Performed by Alison Hayes (ASAD)    GLUCOSE, POC    Collection Time: 02/10/22  8:31 PM   Result Value Ref Range    Glucose (POC) 107 65 - 117 mg/dL    Performed by Reginaldo Lance (PCT)    CBC WITH AUTOMATED DIFF    Collection Time: 02/11/22  2:54 AM   Result Value Ref Range    WBC 9.1 4.1 - 11.1 K/uL    RBC 2.85 (L) 4.10 - 5.70 M/uL    HGB 8.4 (L) 12.1 - 17.0 g/dL    HCT 25.7 (L) 36.6 - 50.3 %    MCV 90.2 80.0 - 99.0 FL    MCH 29.5 26.0 - 34.0 PG    MCHC 32.7 30.0 - 36.5 g/dL    RDW 14.6 (H) 11.5 - 14.5 %    PLATELET 672 (H) 433 - 400 K/uL    NRBC 0.0 0  WBC    ABSOLUTE NRBC 0.00 0.00 - 0.01 K/uL    NEUTROPHILS 66 32 - 75 %    LYMPHOCYTES 24 12 - 49 %    MONOCYTES 7 5 - 13 %    EOSINOPHILS 2 0 - 7 %    BASOPHILS 0 0 - 1 %    IMMATURE GRANULOCYTES 0 0.0 - 0.5 %    ABS. NEUTROPHILS 6.0 1.8 - 8.0 K/UL    ABS. LYMPHOCYTES 2.2 0.8 - 3.5 K/UL    ABS. MONOCYTES 0.7 0.0 - 1.0 K/UL    ABS. EOSINOPHILS 0.2 0.0 - 0.4 K/UL    ABS.  BASOPHILS 0.0 0.0 - 0.1 K/UL    ABS. IMM. GRANS. 0.0 0.00 - 0.04 K/UL    DF AUTOMATED     METABOLIC PANEL, BASIC    Collection Time: 02/11/22  2:54 AM   Result Value Ref Range    Sodium 134 (L) 136 - 145 mmol/L    Potassium 3.6 3.5 - 5.1 mmol/L    Chloride 106 97 - 108 mmol/L    CO2 24 21 - 32 mmol/L    Anion gap 4 (L) 5 - 15 mmol/L    Glucose 59 (L) 65 - 100 mg/dL    BUN 21 (H) 6 - 20 MG/DL    Creatinine 1.57 (H) 0.70 - 1.30 MG/DL    BUN/Creatinine ratio 13 12 - 20      GFR est AA 60 (L) >60 ml/min/1.73m2    GFR est non-AA 49 (L) >60 ml/min/1.73m2    Calcium 7.9 (L) 8.5 - 10.1 MG/DL   GLUCOSE, POC    Collection Time: 02/11/22  4:20 AM   Result Value Ref Range    Glucose (POC) 122 (H) 65 - 117 mg/dL    Performed by Marii Mcknight RN    GLUCOSE, POC    Collection Time: 02/11/22  8:00 AM   Result Value Ref Range    Glucose (POC) 120 (H) 65 - 117 mg/dL    Performed by Lyly Lockett RN      Recent Labs     02/11/22  0254 02/10/22  0103   WBC 9.1 16.7*   HGB 8.4* 10.4*   HCT 25.7* 31.4*   * 629*     Recent Labs     02/11/22  0254 02/10/22  0103 02/09/22  1234 02/09/22  0837 02/09/22  0837 02/09/22  0420 02/09/22  0420 02/08/22  2238 02/08/22  2034   * 142 143   < > 146*   < > 144   < > 130*   K 3.6 4.1 4.6   < > 4.2   < > 3.8   < > 5.0    112* 114*   < > 115*   < > 112*   < > 92*   CO2 24 21 22   < > 26   < > 18*   < > 10*   BUN 21* 37* 40*   < > 42*   < > 43*   < > 43*   CREA 1.57* 2.26* 2.15*   < > 2.28*   < > 2.61*   < > 2.30*   GLU 59* 156* 136*   < > 86   < > 239*   < > 911*   CA 7.9* 9.0 8.1*   < > 8.5   < > 8.4*   < > 8.5   MG  --   --  2.4  --  2.5*  --  2.5*   < > 2.7*   PHOS  --   --   --   --   --   --   --   --  8.5*    < > = values in this interval not displayed. Recent Labs     02/08/22 1922   ALT 15   *   TBILI 0.5   TP 6.5   ALB 1.5*   GLOB 5.0*   LPSE 39*     Recent Labs     02/08/22 1922   INR 1.0   PTP 10.7      No results for input(s): FE, TIBC, PSAT, FERR in the last 72 hours.    No results found for: FOL, RBCF   No results for input(s): PH, PCO2, PO2 in the last 72 hours. No results for input(s): CPK, CKNDX, TROIQ in the last 72 hours. No lab exists for component: CPKMB  Lab Results   Component Value Date/Time    Cholesterol, total 192 11/15/2021 12:01 PM    HDL Cholesterol 61 11/15/2021 12:01 PM    LDL, calculated 89.4 11/15/2021 12:01 PM    Triglyceride 208 (H) 11/15/2021 12:01 PM    CHOL/HDL Ratio 3.1 11/15/2021 12:01 PM     Lab Results   Component Value Date/Time    Glucose (POC) 120 (H) 02/11/2022 08:00 AM    Glucose (POC) 122 (H) 02/11/2022 04:20 AM    Glucose (POC) 107 02/10/2022 08:31 PM    Glucose (POC) 95 02/10/2022 04:35 PM    Glucose (POC) 70 02/10/2022 04:13 PM     Lab Results   Component Value Date/Time    Color YELLOW/STRAW 02/08/2022 07:56 PM    Appearance TURBID (A) 02/08/2022 07:56 PM    Specific gravity 1.024 02/08/2022 07:56 PM    Specific gravity 1.015 01/01/2022 11:19 AM    pH (UA) 5.5 02/08/2022 07:56 PM    Protein 300 (A) 02/08/2022 07:56 PM    Glucose >1,000 (A) 02/08/2022 07:56 PM    Ketone 80 (A) 02/08/2022 07:56 PM    Bilirubin Negative 02/08/2022 07:56 PM    Urobilinogen 0.2 02/08/2022 07:56 PM    Nitrites Negative 02/08/2022 07:56 PM    Leukocyte Esterase MODERATE (A) 02/08/2022 07:56 PM    Epithelial cells FEW 02/08/2022 07:56 PM    Bacteria 4+ (A) 02/08/2022 07:56 PM    WBC >100 (H) 02/08/2022 07:56 PM    RBC 0-5 02/08/2022 07:56 PM           Assessment:     Active Problems:    DKA (diabetic ketoacidosis) ([address] (12/2/2021)      Coffee ground emesis (2/9/2022)      COVID-19 (2/9/2022)        Plan:   No further s/sx of bleeding. He is tolerating diet without any further N/V. Hgb decreased to 8.4 from 10.4 today but remains stable and BP upper limits of normal. Continue on Reglan q6 hours. He remains symptomatic from COVID infection, hold off on EGD unless he has hemodynamically unstable bleeding. Can schedule as an O/P once he is out of quarantine.  Continue to monitor hgb while admitted and transfuse as needed. MANUEL Severino    02/11/22  8:42 AM  [id number] [facility], [address] [facility]  [address] [address] [place]: [phone]      In general his nausea is better. No more vomiting seen. His hgb has dropped a little with no overt bleeding. He seems to have responded well to Metoclopramide q 6 hrs. We will continue PPI BID and Carafate PO. Hold off on EGD until COVID clears or as OP. If he starts with obvious GI bleeding and/or hgb keeps dropping consider an inpatient EGD. We will have our call partner do a chart check once this weekend. I discussed the case with the [facility] Provider. I have personally reviewed the history of the patient. I have reviewed the chart and agree with the documentation recorded by the Mid Level Provider, including the assessment, treatment plan, and disposition. Blood pressure (!) 155/88, pulse 86, temperature 97.8 °F (36.6 °C), resp. rate 18, height 5' 9\" (1.753 m), weight 66.2 kg (146 lb), SpO2 96 %. Data Review        Recent Labs     02/11/22  0254 02/10/22  0103 02/09/22  1234 02/09/22  0837 02/09/22  0837 02/09/22  0420 02/09/22  0420 02/08/22  2238 02/08/22  2034   * 142 143   < > 146*   < > 144   < > 130*   K 3.6 4.1 4.6   < > 4.2   < > 3.8   < > 5.0    112* 114*   < > 115*   < > 112*   < > 92*   CO2 24 21 22   < > 26   < > 18*   < > 10*   BUN 21* 37* 40*   < > 42*   < > 43*   < > 43*   CREA 1.57* 2.26* 2.15*   < > 2.28*   < > 2.61*   < > 2.30*   GLU 59* 156* 136*   < > 86   < > 239*   < > 911*   CA 7.9* 9.0 8.1*   < > 8.5   < > 8.4*   < > 8.5   MG  --   --  2.4  --  2.5*  --  2.5*   < > 2.7*   PHOS  --   --   --   --   --   --   --   --  8.5*    < > = values in this interval not displayed.      Recent Labs     02/08/22 1922   ALT 15   *   TBILI 0.5   TP 6.5   ALB 1.5*   GLOB 5.0*   LPSE 39*     Recent Labs     02/08/22 1922   INR 1.0   PTP 10.7        No lab exists for component: CPKMB  Lab Results   Component Value Date/Time    Cholesterol, total 192 11/15/2021 12:01 PM    HDL Cholesterol 61 11/15/2021 12:01 PM    LDL, calculated 89.4 11/15/2021 12:01 PM    Triglyceride 208 (H) 11/15/2021 12:01 PM    CHOL/HDL Ratio 3.1 11/15/2021 12:01 PM     Lab Results   Component Value Date/Time    Glucose (POC) 120 (H) 02/11/2022 08:00 AM    Glucose (POC) 122 (H) 02/11/2022 04:20 AM    Glucose (POC) 107 02/10/2022 08:31 PM    Glucose (POC) 95 02/10/2022 04:35 PM    Glucose (POC) 70 02/10/2022 04:13 PM     Lab Results   Component Value Date/Time    Color YELLOW/STRAW 02/08/2022 07:56 PM    Appearance TURBID (A) 02/08/2022 07:56 PM    Specific gravity 1.024 02/08/2022 07:56 PM    Specific gravity 1.015 01/01/2022 11:19 AM    pH (UA) 5.5 02/08/2022 07:56 PM    Protein 300 (A) 02/08/2022 07:56 PM    Glucose >1,000 (A) 02/08/2022 07:56 PM    Ketone 80 (A) 02/08/2022 07:56 PM    Bilirubin Negative 02/08/2022 07:56 PM    Urobilinogen 0.2 02/08/2022 07:56 PM    Nitrites Negative 02/08/2022 07:56 PM    Leukocyte Esterase MODERATE (A) 02/08/2022 07:56 PM    Epithelial cells FEW 02/08/2022 07:56 PM    Bacteria 4+ (A) 02/08/2022 07:56 PM    WBC >100 (H) 02/08/2022 07:56 PM    RBC 0-5 02/08/2022 07:56 PM       Kirt Dumont MD

## 2022-02-11 NOTE — PROGRESS NOTES
Problem: Falls - Risk of  Goal: *Absence of Falls  Description: Document Kevin Velez Fall Risk and appropriate interventions in the flowsheet. Outcome: Progressing Towards Goal  Note: Fall Risk Interventions:  Mobility Interventions: Bed/chair exit alarm,Communicate number of staff needed for ambulation/transfer,Patient to call before getting OOB         Medication Interventions: Bed/chair exit alarm,Patient to call before getting OOB,Teach patient to arise slowly    Elimination Interventions: Bed/chair exit alarm,Call light in reach,Patient to call for help with toileting needs,Stay With Me (per policy),Toileting schedule/hourly rounds              Problem: Patient Education: Go to Patient Education Activity  Goal: Patient/Family Education  Outcome: Progressing Towards Goal     Problem: Diabetes Self-Management  Goal: *Disease process and treatment process  Description: Define diabetes and identify own type of diabetes; list 3 options for treating diabetes. Outcome: Progressing Towards Goal  Goal: *Incorporating nutritional management into lifestyle  Description: Describe effect of type, amount and timing of food on blood glucose; list 3 methods for planning meals. Outcome: Progressing Towards Goal  Goal: *Incorporating physical activity into lifestyle  Description: State effect of exercise on blood glucose levels. Outcome: Progressing Towards Goal  Goal: *Developing strategies to promote health/change behavior  Description: Define the ABC's of diabetes; identify appropriate screenings, schedule and personal plan for screenings. Outcome: Progressing Towards Goal  Goal: *Using medications safely  Description: State effect of diabetes medications on diabetes; name diabetes medication taking, action and side effects. Outcome: Progressing Towards Goal  Goal: *Monitoring blood glucose, interpreting and using results  Description: Identify recommended blood glucose targets  and personal targets.   Outcome: Progressing Towards Goal  Goal: *Prevention, detection, treatment of acute complications  Description: List symptoms of hyper- and hypoglycemia; describe how to treat low blood sugar and actions for lowering  high blood glucose level. Outcome: Progressing Towards Goal  Goal: *Prevention, detection and treatment of chronic complications  Description: Define the natural course of diabetes and describe the relationship of blood glucose levels to long term complications of diabetes. Outcome: Progressing Towards Goal  Goal: *Developing strategies to address psychosocial issues  Description: Describe feelings about living with diabetes; identify support needed and support network  Outcome: Progressing Towards Goal  Goal: *Insulin pump training  Outcome: Progressing Towards Goal  Goal: *Sick day guidelines  Outcome: Progressing Towards Goal  Goal: *Patient Specific Goal (EDIT GOAL, INSERT TEXT)  Outcome: Progressing Towards Goal     Problem: Patient Education: Go to Patient Education Activity  Goal: Patient/Family Education  Outcome: Progressing Towards Goal     Problem: Airway Clearance - Ineffective  Goal: Achieve or maintain patent airway  Outcome: Progressing Towards Goal     Problem: Gas Exchange - Impaired  Goal: Absence of hypoxia  Outcome: Progressing Towards Goal  Goal: Promote optimal lung function  Outcome: Progressing Towards Goal     Problem: Breathing Pattern - Ineffective  Goal: Ability to achieve and maintain a regular respiratory rate  Outcome: Progressing Towards Goal     Problem:  Body Temperature -  Risk of, Imbalanced  Goal: Ability to maintain a body temperature within defined limits  Outcome: Progressing Towards Goal  Goal: Will regain or maintain usual level of consciousness  Outcome: Progressing Towards Goal  Goal: Complications related to the disease process, condition or treatment will be avoided or minimized  Outcome: Progressing Towards Goal     Problem: Isolation Precautions - Risk of Spread of Infection  Goal: Prevent transmission of infectious organism to others  Outcome: Progressing Towards Goal     Problem: Nutrition Deficits  Goal: Optimize nutrtional status  Outcome: Progressing Towards Goal     Problem: Risk for Fluid Volume Deficit  Goal: Maintain normal heart rhythm  Outcome: Progressing Towards Goal  Goal: Maintain absence of muscle cramping  Outcome: Progressing Towards Goal  Goal: Maintain normal serum potassium, sodium, calcium, phosphorus, and pH  Outcome: Progressing Towards Goal     Problem: Loneliness or Risk for Loneliness  Goal: Demonstrate positive use of time alone when socialization is not possible  Outcome: Progressing Towards Goal     Problem: Fatigue  Goal: Verbalize increase energy and improved vitality  Outcome: Not Progressing Towards Goal     Problem: Patient Education: Go to Patient Education Activity  Goal: Patient/Family Education  Outcome: Progressing Towards Goal     Problem: Infection - Risk of, Urinary Catheter-Associated Urinary Tract Infection  Goal: *Absence of infection signs and symptoms  Outcome: Progressing Towards Goal     Problem: Patient Education: Go to Patient Education Activity  Goal: Patient/Family Education  Outcome: Progressing Towards Goal

## 2022-02-12 LAB
ANION GAP SERPL CALC-SCNC: 5 MMOL/L (ref 5–15)
BUN SERPL-MCNC: 20 MG/DL (ref 6–20)
BUN/CREAT SERPL: 11 (ref 12–20)
CALCIUM SERPL-MCNC: 8.1 MG/DL (ref 8.5–10.1)
CHLORIDE SERPL-SCNC: 103 MMOL/L (ref 97–108)
CO2 SERPL-SCNC: 25 MMOL/L (ref 21–32)
CREAT SERPL-MCNC: 1.87 MG/DL (ref 0.7–1.3)
FERRITIN SERPL-MCNC: 238 NG/ML (ref 26–388)
GLUCOSE BLD STRIP.AUTO-MCNC: 155 MG/DL (ref 65–117)
GLUCOSE BLD STRIP.AUTO-MCNC: 180 MG/DL (ref 65–117)
GLUCOSE BLD STRIP.AUTO-MCNC: 251 MG/DL (ref 65–117)
GLUCOSE BLD STRIP.AUTO-MCNC: 308 MG/DL (ref 65–117)
GLUCOSE SERPL-MCNC: 318 MG/DL (ref 65–100)
HGB BLD-MCNC: 9.6 G/DL (ref 12.1–17)
IRON SATN MFR SERPL: 51 % (ref 20–50)
IRON SERPL-MCNC: 62 UG/DL (ref 35–150)
POTASSIUM SERPL-SCNC: 4 MMOL/L (ref 3.5–5.1)
SERVICE CMNT-IMP: ABNORMAL
SODIUM SERPL-SCNC: 133 MMOL/L (ref 136–145)
TIBC SERPL-MCNC: 122 UG/DL (ref 250–450)

## 2022-02-12 PROCEDURE — 74011250637 HC RX REV CODE- 250/637: Performed by: STUDENT IN AN ORGANIZED HEALTH CARE EDUCATION/TRAINING PROGRAM

## 2022-02-12 PROCEDURE — 82962 GLUCOSE BLOOD TEST: CPT

## 2022-02-12 PROCEDURE — 83540 ASSAY OF IRON: CPT

## 2022-02-12 PROCEDURE — 74011250636 HC RX REV CODE- 250/636: Performed by: INTERNAL MEDICINE

## 2022-02-12 PROCEDURE — 36415 COLL VENOUS BLD VENIPUNCTURE: CPT

## 2022-02-12 PROCEDURE — 82728 ASSAY OF FERRITIN: CPT

## 2022-02-12 PROCEDURE — 74011250637 HC RX REV CODE- 250/637: Performed by: PHYSICIAN ASSISTANT

## 2022-02-12 PROCEDURE — 85018 HEMOGLOBIN: CPT

## 2022-02-12 PROCEDURE — 74011636637 HC RX REV CODE- 636/637: Performed by: STUDENT IN AN ORGANIZED HEALTH CARE EDUCATION/TRAINING PROGRAM

## 2022-02-12 PROCEDURE — C9113 INJ PANTOPRAZOLE SODIUM, VIA: HCPCS | Performed by: INTERNAL MEDICINE

## 2022-02-12 PROCEDURE — 74011250637 HC RX REV CODE- 250/637: Performed by: INTERNAL MEDICINE

## 2022-02-12 PROCEDURE — 74011250636 HC RX REV CODE- 250/636: Performed by: PHYSICIAN ASSISTANT

## 2022-02-12 PROCEDURE — 74011000250 HC RX REV CODE- 250: Performed by: INTERNAL MEDICINE

## 2022-02-12 PROCEDURE — 80048 BASIC METABOLIC PNL TOTAL CA: CPT

## 2022-02-12 PROCEDURE — 74011000258 HC RX REV CODE- 258: Performed by: STUDENT IN AN ORGANIZED HEALTH CARE EDUCATION/TRAINING PROGRAM

## 2022-02-12 PROCEDURE — 65660000000 HC RM CCU STEPDOWN

## 2022-02-12 PROCEDURE — 74011250636 HC RX REV CODE- 250/636: Performed by: STUDENT IN AN ORGANIZED HEALTH CARE EDUCATION/TRAINING PROGRAM

## 2022-02-12 RX ORDER — INSULIN GLARGINE 100 [IU]/ML
16 INJECTION, SOLUTION SUBCUTANEOUS DAILY
Status: DISCONTINUED | OUTPATIENT
Start: 2022-02-13 | End: 2022-02-12

## 2022-02-12 RX ORDER — INSULIN GLARGINE 100 [IU]/ML
28 INJECTION, SOLUTION SUBCUTANEOUS DAILY
Status: DISCONTINUED | OUTPATIENT
Start: 2022-02-13 | End: 2022-02-14

## 2022-02-12 RX ADMIN — CARVEDILOL 12.5 MG: 12.5 TABLET, FILM COATED ORAL at 08:32

## 2022-02-12 RX ADMIN — HYDRALAZINE HYDROCHLORIDE 20 MG: 20 INJECTION INTRAMUSCULAR; INTRAVENOUS at 09:07

## 2022-02-12 RX ADMIN — MORPHINE SULFATE 2 MG: 2 INJECTION, SOLUTION INTRAMUSCULAR; INTRAVENOUS at 21:44

## 2022-02-12 RX ADMIN — MORPHINE SULFATE 2 MG: 2 INJECTION, SOLUTION INTRAMUSCULAR; INTRAVENOUS at 15:12

## 2022-02-12 RX ADMIN — PIPERACILLIN AND TAZOBACTAM 3.38 G: 3; .375 INJECTION, POWDER, LYOPHILIZED, FOR SOLUTION INTRAVENOUS at 21:44

## 2022-02-12 RX ADMIN — METOCLOPRAMIDE HYDROCHLORIDE 5 MG: 5 INJECTION INTRAMUSCULAR; INTRAVENOUS at 14:15

## 2022-02-12 RX ADMIN — SODIUM CHLORIDE 40 MG: 9 INJECTION INTRAMUSCULAR; INTRAVENOUS; SUBCUTANEOUS at 08:29

## 2022-02-12 RX ADMIN — SUCRALFATE 1 G: 1 TABLET ORAL at 11:56

## 2022-02-12 RX ADMIN — Medication 5 UNITS: at 11:57

## 2022-02-12 RX ADMIN — Medication 2 UNITS: at 16:22

## 2022-02-12 RX ADMIN — PIPERACILLIN AND TAZOBACTAM 3.38 G: 3; .375 INJECTION, POWDER, LYOPHILIZED, FOR SOLUTION INTRAVENOUS at 06:46

## 2022-02-12 RX ADMIN — CARVEDILOL 12.5 MG: 12.5 TABLET, FILM COATED ORAL at 16:22

## 2022-02-12 RX ADMIN — MORPHINE SULFATE 2 MG: 2 INJECTION, SOLUTION INTRAMUSCULAR; INTRAVENOUS at 03:33

## 2022-02-12 RX ADMIN — METOCLOPRAMIDE HYDROCHLORIDE 5 MG: 5 INJECTION INTRAMUSCULAR; INTRAVENOUS at 08:29

## 2022-02-12 RX ADMIN — PIPERACILLIN AND TAZOBACTAM 3.38 G: 3; .375 INJECTION, POWDER, LYOPHILIZED, FOR SOLUTION INTRAVENOUS at 14:16

## 2022-02-12 RX ADMIN — INSULIN GLARGINE 10 UNITS: 100 INJECTION, SOLUTION SUBCUTANEOUS at 08:28

## 2022-02-12 RX ADMIN — ROSUVASTATIN CALCIUM 40 MG: 40 TABLET, FILM COATED ORAL at 21:44

## 2022-02-12 RX ADMIN — SODIUM CHLORIDE 40 MG: 9 INJECTION INTRAMUSCULAR; INTRAVENOUS; SUBCUTANEOUS at 21:43

## 2022-02-12 RX ADMIN — SUCRALFATE 1 G: 1 TABLET ORAL at 16:22

## 2022-02-12 RX ADMIN — FINASTERIDE 5 MG: 5 TABLET, FILM COATED ORAL at 08:32

## 2022-02-12 RX ADMIN — METOCLOPRAMIDE HYDROCHLORIDE 5 MG: 5 INJECTION INTRAMUSCULAR; INTRAVENOUS at 21:44

## 2022-02-12 RX ADMIN — SUCRALFATE 1 G: 1 TABLET ORAL at 21:44

## 2022-02-12 RX ADMIN — AMLODIPINE BESYLATE 5 MG: 5 TABLET ORAL at 08:32

## 2022-02-12 RX ADMIN — METOCLOPRAMIDE HYDROCHLORIDE 5 MG: 5 INJECTION INTRAMUSCULAR; INTRAVENOUS at 03:33

## 2022-02-12 RX ADMIN — Medication 7 UNITS: at 08:29

## 2022-02-12 RX ADMIN — SUCRALFATE 1 G: 1 TABLET ORAL at 08:32

## 2022-02-12 RX ADMIN — MORPHINE SULFATE 2 MG: 2 INJECTION, SOLUTION INTRAMUSCULAR; INTRAVENOUS at 09:07

## 2022-02-12 NOTE — PROGRESS NOTES
Problem: Falls - Risk of  Goal: *Absence of Falls  Description: Document Leslie Sahu Fall Risk and appropriate interventions in the flowsheet. Outcome: Progressing Towards Goal  Note: Fall Risk Interventions:  Mobility Interventions: Assess mobility with egress test,Bed/chair exit alarm,Communicate number of staff needed for ambulation/transfer,Patient to call before getting OOB         Medication Interventions: Assess postural VS orthostatic hypotension,Bed/chair exit alarm,Patient to call before getting OOB,Teach patient to arise slowly    Elimination Interventions: Bed/chair exit alarm,Call light in reach,Stay With Me (per policy),Toilet paper/wipes in reach,Toileting schedule/hourly rounds              Problem: Diabetes Self-Management  Goal: *Disease process and treatment process  Description: Define diabetes and identify own type of diabetes; list 3 options for treating diabetes. Outcome: Progressing Towards Goal     Problem: Diabetes Self-Management  Goal: *Developing strategies to promote health/change behavior  Description: Define the ABC's of diabetes; identify appropriate screenings, schedule and personal plan for screenings. Outcome: Progressing Towards Goal     Problem: Diabetes Self-Management  Goal: *Monitoring blood glucose, interpreting and using results  Description: Identify recommended blood glucose targets  and personal targets. Outcome: Progressing Towards Goal     Problem: Airway Clearance - Ineffective  Goal: Achieve or maintain patent airway  Outcome: Progressing Towards Goal     Problem: Gas Exchange - Impaired  Goal: Absence of hypoxia  Outcome: Progressing Towards Goal     Problem:  Body Temperature -  Risk of, Imbalanced  Goal: Ability to maintain a body temperature within defined limits  Outcome: Progressing Towards Goal     Problem: Loneliness or Risk for Loneliness  Goal: Demonstrate positive use of time alone when socialization is not possible  Outcome: Progressing Towards Goal Problem: Fatigue  Goal: Verbalize increase energy and improved vitality  Outcome: Progressing Towards Goal     Problem: Infection - Risk of, Urinary Catheter-Associated Urinary Tract Infection  Goal: *Absence of infection signs and symptoms  Outcome: Progressing Towards Goal     Problem: Pressure Injury - Risk of  Goal: *Prevention of pressure injury  Description: Document Corey Scale and appropriate interventions in the flowsheet.   Outcome: Progressing Towards Goal

## 2022-02-12 NOTE — PROGRESS NOTES
Hospitalist Progress Note    NAME: Carla Lance   :  1980   MRN:  [id number]       Assessment / Plan:    Diabetes mellitus type 1 with DKA POA  -A1c 13.   -Home regimen: lantus 30 units and sliding scale insulin. Likely precipitated by UTI  -Presents with N/V, abdominal pain. - with HCO3 11, AG 29, venous pH 7.25 with pCO2 of 21 on admission  -S/p IV fluid and insulin GTT per DKA protocol  -Reduce Lantus to 10 units. Continue SSI       Urinary retention and severe right hydronephrosis   FELECIA likely secondary to above  Hx of urinary retention and severe right hydronephrosis in 21  BPH  -Baseline creatinine appears to be about 1.2  -Cr 2.3 >> 2.6 >> 1.5 > 1.8  -Beard placed on  for urinary retention. Patient was retaining 1000 cc  -US showed severe right hydronephrosis, evidence of medical renal disease, and markedly distended bladder  -Urology consulted. Started on Proscar. Follow BMP  -Will likely need outpatient urodynamic study to r/o neurogenic bladder  -Pt concerned about prostate cancer, screening can be done OP once UTI resolves     Complicated Klebsiella pneumoniae UTI -POA  -Suprapubic pain.  UA > 100 WBC, 0-5 RBC, 4+ bacteria  -Urine culture grew Klebsiella pneumoniae  -Was initially on empiric ceftriaxone. Antibiotics broadened to Zosyn on 2/10 due to WBC trending up and creatinine not improving. -WBC and creatinine improving today  -ID and urology on board, appreciate input     Nausea/vomiting with abdominal pain POA, likely due to DKA -resolved  ? GI bleed with reported hematemesis POA  Pt reports onset of vomiting then 2 episodes of red blood. Suspect Lillie-Estevez tear  Denies NSAID use. No further bleeding or vomiting since arriving on floor  Hb stable between 8-10. No reported active bleeding or vomiting. Continue PPI and Carafate  GI on board screening Covid test positive.   Elective endoscopy once he is out of quarantine per GI as patient hemodynamically stable and Hb relatively stable.     COVID-19 infection  -Preprocedure screen Covid positive. -Vaccinated. Asymptomatic. Supportive management    Essential HTN POA  Hyperlipidemia POA  Continue statin. Continue to hold lisinopril due to FELECIA. Continue amlodipine as needed hydralazine    Right hip pain  -X-ray unremarkable    18.5 - 24.9 Normal weight / Body mass index is 21.56 kg/m². Estimated discharge date: 2/14  Barriers:    DVT prophylaxis with SCDs till GI bleed ruled out     Code status: Full code  NOK: mother     Subjective:     Chief Complaint / Reason for Physician Visit   Discussed with RN events overnight. No acute events overnight  Complains of right hip pain    Review of Systems:  Symptom Y/N Comments  Symptom Y/N Comments   Fever/Chills    Chest Pain     Poor Appetite    Edema     Cough    Abdominal Pain     Sputum    Joint Pain     SOB/JOHNSTON    Pruritis/Rash     Nausea/vomit    Tolerating PT/OT     Diarrhea    Tolerating Diet     Constipation    Other       Could NOT obtain due to:      Objective:     VITALS:   Last 24hrs VS reviewed since prior progress note.  Most recent are:  Patient Vitals for the past 24 hrs:   Temp Pulse Resp BP SpO2   02/12/22 1429 98 °F (36.7 °C) 85 16 135/88 96 %   02/12/22 1048 97.9 °F (36.6 °C) 86 13 139/82 93 %   02/12/22 0944 -- 85 16 131/77 96 %   02/12/22 0907 -- 84 -- (!) 180/107 --   02/12/22 0828 -- 85 -- (!) 197/110 --   02/12/22 0814 98.2 °F (36.8 °C) 83 15 (!) 197/110 97 %   02/12/22 0332 98.5 °F (36.9 °C) 84 18 (!) 153/98 99 %   02/11/22 2249 98.5 °F (36.9 °C) 72 18 116/76 97 %   02/11/22 1953 98.2 °F (36.8 °C) 76 16 136/81 98 %   02/11/22 1624 -- 75 -- 128/85 --       Intake/Output Summary (Last 24 hours) at 2/12/2022 1506  Last data filed at 2/12/2022 1429  Gross per 24 hour   Intake 1320 ml   Output 3900 ml   Net -2580 ml        I had a face to face encounter and independently examined this patient on 2/12/2022, as outlined below:  PHYSICAL EXAM:  General: WD, WN. Alert, cooperative, no acute distress    EENT:  EOMI. Anicteric sclerae. MMM  Resp:  CTA bilaterally, no wheezing or rales. No accessory muscle use  CV:  Regular  rhythm,  No edema  GI:  Soft, Non distended, Non tender. +Bowel sounds  :   Scrotal swelling  Neurologic:  Alert and oriented X 3, normal speech,   Psych:   Good insight. Not anxious nor agitated  Skin:  No rashes. No jaundice    Reviewed most current lab test results and cultures  YES  Reviewed most current radiology test results   YES  Review and summation of old records today    NO  Reviewed patient's current orders and MAR    YES  PMH/SH reviewed - no change compared to H&P  ________________________________________________________________________  Care Plan discussed with:    Comments   Patient x    Family      RN x    Care Manager     Consultant                        Multidiciplinary team rounds were held today with , nursing, pharmacist and clinical coordinator. Patient's plan of care was discussed; medications were reviewed and discharge planning was addressed. ________________________________________________________________________  Total NON critical care TIME:  25   Minutes    Total CRITICAL CARE TIME Spent:   Minutes non procedure based      Comments   >50% of visit spent in counseling and coordination of care x    ________________________________________________________________________  Nitin Carr MD     Procedures: see electronic medical records for all procedures/Xrays and details which were not copied into this note but were reviewed prior to creation of Plan. LABS:  I reviewed today's most current labs and imaging studies.   Pertinent labs include:  Recent Labs     02/12/22 0337 02/11/22  0254 02/10/22  0103   WBC  --  9.1 16.7*   HGB 9.6* 8.4* 10.4*   HCT  --  25.7* 31.4*   PLT  --  408* 629*     Recent Labs     02/12/22 0337 02/11/22 0254 02/10/22  0103   * 134* 142 K 4.0 3.6 4.1    106 112*   CO2 25 24 21   * 59* 156*   BUN 20 21* 37*   CREA 1.87* 1.57* 2.26*   CA 8.1* 7.9* 9.0       Signed: Diamond Doty MD

## 2022-02-12 NOTE — PROGRESS NOTES
0700: Bedside and Verbal shift change report given to [facility] (oncoming nurse) by Ifrah Palmer (offgoing nurse). Report included the following information SBAR, Kardex, Intake/Output, MAR and Recent Results.

## 2022-02-13 LAB
ANION GAP SERPL CALC-SCNC: 3 MMOL/L (ref 5–15)
BASOPHILS # BLD: 0 K/UL (ref 0–0.1)
BASOPHILS NFR BLD: 1 % (ref 0–1)
BUN SERPL-MCNC: 22 MG/DL (ref 6–20)
BUN/CREAT SERPL: 13 (ref 12–20)
CALCIUM SERPL-MCNC: 7.7 MG/DL (ref 8.5–10.1)
CHLORIDE SERPL-SCNC: 103 MMOL/L (ref 97–108)
CO2 SERPL-SCNC: 26 MMOL/L (ref 21–32)
CREAT SERPL-MCNC: 1.63 MG/DL (ref 0.7–1.3)
DIFFERENTIAL METHOD BLD: ABNORMAL
EOSINOPHIL # BLD: 0.1 K/UL (ref 0–0.4)
EOSINOPHIL NFR BLD: 2 % (ref 0–7)
ERYTHROCYTE [DISTWIDTH] IN BLOOD BY AUTOMATED COUNT: 13.7 % (ref 11.5–14.5)
GLUCOSE BLD STRIP.AUTO-MCNC: 192 MG/DL (ref 65–117)
GLUCOSE BLD STRIP.AUTO-MCNC: 195 MG/DL (ref 65–117)
GLUCOSE BLD STRIP.AUTO-MCNC: 209 MG/DL (ref 65–117)
GLUCOSE BLD STRIP.AUTO-MCNC: 353 MG/DL (ref 65–117)
GLUCOSE SERPL-MCNC: 294 MG/DL (ref 65–100)
HCT VFR BLD AUTO: 27 % (ref 36.6–50.3)
HGB BLD-MCNC: 9 G/DL (ref 12.1–17)
IMM GRANULOCYTES # BLD AUTO: 0.1 K/UL (ref 0–0.04)
IMM GRANULOCYTES NFR BLD AUTO: 1 % (ref 0–0.5)
LYMPHOCYTES # BLD: 1.3 K/UL (ref 0.8–3.5)
LYMPHOCYTES NFR BLD: 21 % (ref 12–49)
MCH RBC QN AUTO: 29.9 PG (ref 26–34)
MCHC RBC AUTO-ENTMCNC: 33.3 G/DL (ref 30–36.5)
MCV RBC AUTO: 89.7 FL (ref 80–99)
MONOCYTES # BLD: 0.5 K/UL (ref 0–1)
MONOCYTES NFR BLD: 8 % (ref 5–13)
NEUTS SEG # BLD: 4.1 K/UL (ref 1.8–8)
NEUTS SEG NFR BLD: 67 % (ref 32–75)
NRBC # BLD: 0 K/UL (ref 0–0.01)
NRBC BLD-RTO: 0 PER 100 WBC
PLATELET # BLD AUTO: 406 K/UL (ref 150–400)
PMV BLD AUTO: 9.5 FL (ref 8.9–12.9)
POTASSIUM SERPL-SCNC: 3.9 MMOL/L (ref 3.5–5.1)
RBC # BLD AUTO: 3.01 M/UL (ref 4.1–5.7)
SERVICE CMNT-IMP: ABNORMAL
SODIUM SERPL-SCNC: 132 MMOL/L (ref 136–145)
WBC # BLD AUTO: 6.1 K/UL (ref 4.1–11.1)

## 2022-02-13 PROCEDURE — C9113 INJ PANTOPRAZOLE SODIUM, VIA: HCPCS | Performed by: INTERNAL MEDICINE

## 2022-02-13 PROCEDURE — 82962 GLUCOSE BLOOD TEST: CPT

## 2022-02-13 PROCEDURE — 74011000258 HC RX REV CODE- 258: Performed by: STUDENT IN AN ORGANIZED HEALTH CARE EDUCATION/TRAINING PROGRAM

## 2022-02-13 PROCEDURE — 74011250637 HC RX REV CODE- 250/637: Performed by: STUDENT IN AN ORGANIZED HEALTH CARE EDUCATION/TRAINING PROGRAM

## 2022-02-13 PROCEDURE — 74011000250 HC RX REV CODE- 250: Performed by: INTERNAL MEDICINE

## 2022-02-13 PROCEDURE — 74011250637 HC RX REV CODE- 250/637: Performed by: INTERNAL MEDICINE

## 2022-02-13 PROCEDURE — 85025 COMPLETE CBC W/AUTO DIFF WBC: CPT

## 2022-02-13 PROCEDURE — 74011250636 HC RX REV CODE- 250/636: Performed by: STUDENT IN AN ORGANIZED HEALTH CARE EDUCATION/TRAINING PROGRAM

## 2022-02-13 PROCEDURE — 36415 COLL VENOUS BLD VENIPUNCTURE: CPT

## 2022-02-13 PROCEDURE — 74011250636 HC RX REV CODE- 250/636: Performed by: INTERNAL MEDICINE

## 2022-02-13 PROCEDURE — 74011250636 HC RX REV CODE- 250/636: Performed by: PHYSICIAN ASSISTANT

## 2022-02-13 PROCEDURE — 74011250637 HC RX REV CODE- 250/637: Performed by: PHYSICIAN ASSISTANT

## 2022-02-13 PROCEDURE — 65660000000 HC RM CCU STEPDOWN

## 2022-02-13 PROCEDURE — 80048 BASIC METABOLIC PNL TOTAL CA: CPT

## 2022-02-13 PROCEDURE — 74011636637 HC RX REV CODE- 636/637: Performed by: STUDENT IN AN ORGANIZED HEALTH CARE EDUCATION/TRAINING PROGRAM

## 2022-02-13 RX ADMIN — SODIUM CHLORIDE 40 MG: 9 INJECTION INTRAMUSCULAR; INTRAVENOUS; SUBCUTANEOUS at 21:28

## 2022-02-13 RX ADMIN — MORPHINE SULFATE 2 MG: 2 INJECTION, SOLUTION INTRAMUSCULAR; INTRAVENOUS at 19:38

## 2022-02-13 RX ADMIN — SUCRALFATE 1 G: 1 TABLET ORAL at 17:37

## 2022-02-13 RX ADMIN — ROSUVASTATIN CALCIUM 40 MG: 40 TABLET, FILM COATED ORAL at 21:28

## 2022-02-13 RX ADMIN — METOCLOPRAMIDE HYDROCHLORIDE 5 MG: 5 INJECTION INTRAMUSCULAR; INTRAVENOUS at 14:30

## 2022-02-13 RX ADMIN — HYDRALAZINE HYDROCHLORIDE 20 MG: 20 INJECTION INTRAMUSCULAR; INTRAVENOUS at 02:23

## 2022-02-13 RX ADMIN — METOCLOPRAMIDE HYDROCHLORIDE 5 MG: 5 INJECTION INTRAMUSCULAR; INTRAVENOUS at 08:37

## 2022-02-13 RX ADMIN — Medication 8 UNITS: at 08:37

## 2022-02-13 RX ADMIN — METOCLOPRAMIDE HYDROCHLORIDE 5 MG: 5 INJECTION INTRAMUSCULAR; INTRAVENOUS at 02:23

## 2022-02-13 RX ADMIN — MORPHINE SULFATE 2 MG: 2 INJECTION, SOLUTION INTRAMUSCULAR; INTRAVENOUS at 02:23

## 2022-02-13 RX ADMIN — CARVEDILOL 12.5 MG: 12.5 TABLET, FILM COATED ORAL at 17:37

## 2022-02-13 RX ADMIN — SUCRALFATE 1 G: 1 TABLET ORAL at 08:37

## 2022-02-13 RX ADMIN — AMLODIPINE BESYLATE 5 MG: 5 TABLET ORAL at 08:37

## 2022-02-13 RX ADMIN — METOCLOPRAMIDE HYDROCHLORIDE 5 MG: 5 INJECTION INTRAMUSCULAR; INTRAVENOUS at 21:28

## 2022-02-13 RX ADMIN — SODIUM CHLORIDE 40 MG: 9 INJECTION INTRAMUSCULAR; INTRAVENOUS; SUBCUTANEOUS at 08:37

## 2022-02-13 RX ADMIN — FINASTERIDE 5 MG: 5 TABLET, FILM COATED ORAL at 08:37

## 2022-02-13 RX ADMIN — PIPERACILLIN AND TAZOBACTAM 3.38 G: 3; .375 INJECTION, POWDER, LYOPHILIZED, FOR SOLUTION INTRAVENOUS at 06:55

## 2022-02-13 RX ADMIN — Medication 3 UNITS: at 17:37

## 2022-02-13 RX ADMIN — SUCRALFATE 1 G: 1 TABLET ORAL at 12:12

## 2022-02-13 RX ADMIN — Medication 2 UNITS: at 12:12

## 2022-02-13 RX ADMIN — INSULIN GLARGINE 28 UNITS: 100 INJECTION, SOLUTION SUBCUTANEOUS at 08:37

## 2022-02-13 RX ADMIN — MORPHINE SULFATE 2 MG: 2 INJECTION, SOLUTION INTRAMUSCULAR; INTRAVENOUS at 14:41

## 2022-02-13 RX ADMIN — CEFTRIAXONE SODIUM 2 G: 2 INJECTION, POWDER, FOR SOLUTION INTRAMUSCULAR; INTRAVENOUS at 09:31

## 2022-02-13 RX ADMIN — CARVEDILOL 12.5 MG: 12.5 TABLET, FILM COATED ORAL at 08:37

## 2022-02-13 RX ADMIN — SUCRALFATE 1 G: 1 TABLET ORAL at 21:28

## 2022-02-13 NOTE — PROGRESS NOTES
Lucy Lipps in urine is sensitive to ceftriaxone. Stop zosyn and switch back to ceftriaxone. Urology evaluation is ongoing at the moment. Final ID recs are pending clinical course.          Pebbles Dickey MD  Infectious Diseases

## 2022-02-13 NOTE — PROGRESS NOTES
Problem: Falls - Risk of  Goal: *Absence of Falls  Description: Document Dennie Oak Fall Risk and appropriate interventions in the flowsheet. Outcome: Progressing Towards Goal  Note: Fall Risk Interventions:  Mobility Interventions: Bed/chair exit alarm,Patient to call before getting OOB         Medication Interventions: Bed/chair exit alarm,Patient to call before getting OOB,Teach patient to arise slowly    Elimination Interventions: Bed/chair exit alarm,Call light in reach,Stay With Me (per policy),Toilet paper/wipes in reach              Problem: Patient Education: Go to Patient Education Activity  Goal: Patient/Family Education  Outcome: Progressing Towards Goal     Problem: Diabetes Self-Management  Goal: *Disease process and treatment process  Description: Define diabetes and identify own type of diabetes; list 3 options for treating diabetes. Outcome: Progressing Towards Goal  Goal: *Incorporating nutritional management into lifestyle  Description: Describe effect of type, amount and timing of food on blood glucose; list 3 methods for planning meals. Outcome: Progressing Towards Goal  Goal: *Incorporating physical activity into lifestyle  Description: State effect of exercise on blood glucose levels. Outcome: Progressing Towards Goal  Goal: *Developing strategies to promote health/change behavior  Description: Define the ABC's of diabetes; identify appropriate screenings, schedule and personal plan for screenings. Outcome: Progressing Towards Goal  Goal: *Using medications safely  Description: State effect of diabetes medications on diabetes; name diabetes medication taking, action and side effects. Outcome: Progressing Towards Goal  Goal: *Monitoring blood glucose, interpreting and using results  Description: Identify recommended blood glucose targets  and personal targets.   Outcome: Progressing Towards Goal  Goal: *Prevention, detection, treatment of acute complications  Description: List symptoms of hyper- and hypoglycemia; describe how to treat low blood sugar and actions for lowering  high blood glucose level. Outcome: Progressing Towards Goal  Goal: *Prevention, detection and treatment of chronic complications  Description: Define the natural course of diabetes and describe the relationship of blood glucose levels to long term complications of diabetes. Outcome: Progressing Towards Goal  Goal: *Developing strategies to address psychosocial issues  Description: Describe feelings about living with diabetes; identify support needed and support network  Outcome: Progressing Towards Goal  Goal: *Insulin pump training  Outcome: Progressing Towards Goal  Goal: *Sick day guidelines  Outcome: Progressing Towards Goal  Goal: *Patient Specific Goal (EDIT GOAL, INSERT TEXT)  Outcome: Progressing Towards Goal     Problem: Patient Education: Go to Patient Education Activity  Goal: Patient/Family Education  Outcome: Progressing Towards Goal     Problem: Airway Clearance - Ineffective  Goal: Achieve or maintain patent airway  Outcome: Progressing Towards Goal     Problem: Gas Exchange - Impaired  Goal: Absence of hypoxia  Outcome: Progressing Towards Goal  Goal: Promote optimal lung function  Outcome: Progressing Towards Goal     Problem: Breathing Pattern - Ineffective  Goal: Ability to achieve and maintain a regular respiratory rate  Outcome: Progressing Towards Goal     Problem:  Body Temperature -  Risk of, Imbalanced  Goal: Ability to maintain a body temperature within defined limits  Outcome: Progressing Towards Goal  Goal: Will regain or maintain usual level of consciousness  Outcome: Progressing Towards Goal  Goal: Complications related to the disease process, condition or treatment will be avoided or minimized  Outcome: Progressing Towards Goal     Problem: Isolation Precautions - Risk of Spread of Infection  Goal: Prevent transmission of infectious organism to others  Outcome: Progressing Towards Goal Problem: Nutrition Deficits  Goal: Optimize nutrtional status  Outcome: Progressing Towards Goal     Problem: Risk for Fluid Volume Deficit  Goal: Maintain normal heart rhythm  Outcome: Progressing Towards Goal  Goal: Maintain absence of muscle cramping  Outcome: Progressing Towards Goal  Goal: Maintain normal serum potassium, sodium, calcium, phosphorus, and pH  Outcome: Progressing Towards Goal     Problem: Loneliness or Risk for Loneliness  Goal: Demonstrate positive use of time alone when socialization is not possible  Outcome: Progressing Towards Goal     Problem: Fatigue  Goal: Verbalize increase energy and improved vitality  Outcome: Progressing Towards Goal     Problem: Patient Education: Go to Patient Education Activity  Goal: Patient/Family Education  Outcome: Progressing Towards Goal     Problem: Infection - Risk of, Urinary Catheter-Associated Urinary Tract Infection  Goal: *Absence of infection signs and symptoms  Outcome: Progressing Towards Goal     Problem: Patient Education: Go to Patient Education Activity  Goal: Patient/Family Education  Outcome: Progressing Towards Goal     Problem: Pressure Injury - Risk of  Goal: *Prevention of pressure injury  Description: Document Corey Scale and appropriate interventions in the flowsheet.   Outcome: Progressing Towards Goal     Problem: Patient Education: Go to Patient Education Activity  Goal: Patient/Family Education  Outcome: Progressing Towards Goal

## 2022-02-13 NOTE — PROGRESS NOTES
Hospitalist Progress Note    NAME: Andrez Walsh   :  1980   MRN:  [id number]       Assessment / Plan:    Diabetes mellitus type 1 with DKA POA  -Presents with N/V, abdominal pain. DKA likely precipitated by UTI  -A1c 13. Home regimen: lantus 30 units and sliding scale insulin. - with HCO3 11, AG 29, venous pH 7.25 with pCO2 of 21 on admission  -S/p IV fluid and insulin gtt per DKA protocol  -Increase Lantus to 28 units. continue SSI     Urinary retention and severe right hydronephrosis   FELECIA likely secondary to above on CKD stage 3 - POA  Hx of urinary retention and severe right hydronephrosis in 21  BPH  -Baseline creatinine appears to be about 1.2  -Cr 2.3 >> 2.6 >> 1.6  -Beard placed on  for urinary retention. Patient was retaining 1000 cc  -US showed severe right hydronephrosis, evidence of medical renal disease, and markedly distended bladder  -Urology consulted. Started on Proscar. Follow BMP  -Will likely need outpatient urodynamic study to r/o neurogenic bladder  -Pt concerned about prostate cancer, screening can be done OP once UTI resolves     Complicated Klebsiella pneumoniae UTI -POA  -Suprapubic pain. Ucx grew klebsiella pneumoniae  -Was initially on empiric ceftriaxone. Antibiotics broadened to Zosyn on 2/10 due to WBC trending up.   -abx changed back to Rocephin today based on sensitivity  -Leukocytosis resolved. Creatinine improving. -ID and urology on board, appreciate input     Nausea/vomiting with abdominal pain POA, likely due to DKA -resolved  ? GI bleed with reported hematemesis POA  Pt reports onset of vomiting then 2 episodes of red blood. Suspect Lillie-Estevez tear  Denies NSAID use. No further bleeding or vomiting since arriving on floor  Hb stable between 8-10. No reported active bleeding or vomiting. Continue PPI and Carafate  GI on board screening Covid test positive.   Elective endoscopy once he is out of quarantine per GI as patient hemodynamically stable and Hb relatively stable.     COVID-19 infection  -Preprocedure screen Covid positive. -Vaccinated. Asymptomatic. Supportive management    Essential HTN POA  Hyperlipidemia POA  Continue statin. Continue to hold lisinopril due to FELECIA. Continue amlodipine as needed hydralazine    Right hip pain  -X-ray unremarkable    18.5 - 24.9 Normal weight / Body mass index is 21.56 kg/m². Estimated discharge date: 2/14  Barriers:    DVT prophylaxis SCD     Code status: Full code  NOK: mother     Subjective:     Chief Complaint / Reason for Physician Visit   Discussed with RN events overnight. No acute events overnight  No complaints today. Denies abdominal pain, nausea, vomiting. Review of Systems:  Symptom Y/N Comments  Symptom Y/N Comments   Fever/Chills    Chest Pain     Poor Appetite    Edema     Cough    Abdominal Pain     Sputum    Joint Pain     SOB/JOHNSTON    Pruritis/Rash     Nausea/vomit    Tolerating PT/OT     Diarrhea    Tolerating Diet     Constipation    Other       Could NOT obtain due to:      Objective:     VITALS:   Last 24hrs VS reviewed since prior progress note. Most recent are:  Patient Vitals for the past 24 hrs:   Temp Pulse Resp BP SpO2   02/13/22 0730 -- 80 20 (!) 147/94 95 %   02/13/22 0255 -- -- -- 120/74 --   02/13/22 0220 97.8 °F (36.6 °C) 80 12 (!) 171/98 97 %   02/12/22 2246 97.9 °F (36.6 °C) 82 14 135/87 95 %   02/12/22 2005 97.9 °F (36.6 °C) 81 12 125/83 97 %   02/12/22 1622 -- 84 -- (!) 159/96 --   02/12/22 1429 98 °F (36.7 °C) 85 16 135/88 96 %   02/12/22 1048 97.9 °F (36.6 °C) 86 13 139/82 93 %   02/12/22 0944 -- 85 16 131/77 96 %       Intake/Output Summary (Last 24 hours) at 2/13/2022 9419  Last data filed at 2/13/2022 3307  Gross per 24 hour   Intake 1010 ml   Output 3700 ml   Net -2690 ml        I had a face to face encounter and independently examined this patient on 2/13/2022, as outlined below:  PHYSICAL EXAM:  General: WD, WN.  Alert, cooperative, no acute distress    EENT:  EOMI. Anicteric sclerae. MMM  Resp:  CTA bilaterally, no wheezing or rales. No accessory muscle use  CV:  Regular  rhythm,  No edema  GI:  Soft, Non distended, Non tender. +Bowel sounds  :   Scrotal swelling  Neurologic:  Alert and oriented X 3, normal speech,   Psych:   Good insight. Not anxious nor agitated  Skin:  No rashes. No jaundice    Reviewed most current lab test results and cultures  YES  Reviewed most current radiology test results   YES  Review and summation of old records today    NO  Reviewed patient's current orders and MAR    YES  PMH/SH reviewed - no change compared to H&P  ________________________________________________________________________  Care Plan discussed with:    Comments   Patient x    Family      RN x    Care Manager     Consultant                        Multidiciplinary team rounds were held today with , nursing, pharmacist and clinical coordinator. Patient's plan of care was discussed; medications were reviewed and discharge planning was addressed. ________________________________________________________________________  Total NON critical care TIME:  25   Minutes    Total CRITICAL CARE TIME Spent:   Minutes non procedure based      Comments   >50% of visit spent in counseling and coordination of care x    ________________________________________________________________________  Willow Duverney, MD     Procedures: see electronic medical records for all procedures/Xrays and details which were not copied into this note but were reviewed prior to creation of Plan. LABS:  I reviewed today's most current labs and imaging studies.   Pertinent labs include:  Recent Labs     02/13/22  0101 02/12/22 0337 02/11/22 0254   WBC 6.1  --  9.1   HGB 9.0* 9.6* 8.4*   HCT 27.0*  --  25.7*   *  --  408*     Recent Labs     02/13/22  0101 02/12/22 0337 02/11/22 0254   * 133* 134*   K 3.9 4.0 3.6    103 106   CO2 26 25 24   GLU 294* 318* 59*   BUN 22* 20 21*   CREA 1.63* 1.87* 1.57*   CA 7.7* 8.1* 7.9*       Signed: Ramya Chopra MD

## 2022-02-13 NOTE — PROGRESS NOTES
End of Shift Note    Bedside shift change report given to [facility] [address] (oncoming nurse) by Edwin Kumar (offgoing nurse). Report included the following information SBAR, Kardex and MAR    Shift worked:  night     Shift summary and any significant changes:    Morphine given twice with relief. No significant changes. Concerns for physician to address:       Zone phone for oncoming shift:   [phone]       Activity:  Activity Level: Up with Assistance  Number times ambulated in hallways past shift: 0  Number of times OOB to chair past shift: 0    Cardiac:   Cardiac Monitoring: Yes      Cardiac Rhythm: Sinus Rhythm    Access:   Current line(s): PIV     Genitourinary:   Urinary status: Beard    Respiratory:   O2 Device: None (Room air)  Chronic home O2 use?: NO  Incentive spirometer at bedside: NO     GI:  Last Bowel Movement Date: 02/08/22  Current diet:  DIET ONE TIME MESSAGE  ADULT DIET Regular; GI [place] (GERD/Peptic Ulcer); dislikes chicken breast  Passing flatus: YES  Tolerating current diet: YES       Pain Management:   Patient states pain is manageable on current regimen: YES    Skin:  Corey Score: 18  Interventions: increase time out of bed    Patient Safety:  Fall Score:  Total Score: 3  Interventions: bed/chair alarm, gripper socks and pt to call before getting OOB  High Fall Risk: Yes    Length of Stay:  Expected LOS: 3d 19h  Actual LOS: [address]

## 2022-02-14 ENCOUNTER — APPOINTMENT (OUTPATIENT)
Dept: CT IMAGING | Age: 42
DRG: 420 | End: 2022-02-14
Attending: STUDENT IN AN ORGANIZED HEALTH CARE EDUCATION/TRAINING PROGRAM
Payer: COMMERCIAL

## 2022-02-14 VITALS
DIASTOLIC BLOOD PRESSURE: 91 MMHG | HEART RATE: 84 BPM | SYSTOLIC BLOOD PRESSURE: 133 MMHG | BODY MASS INDEX: 21.62 KG/M2 | HEIGHT: 69 IN | WEIGHT: 146 LBS | OXYGEN SATURATION: 99 % | RESPIRATION RATE: 18 BRPM | TEMPERATURE: 98 F

## 2022-02-14 LAB
ANION GAP SERPL CALC-SCNC: 4 MMOL/L (ref 5–15)
BUN SERPL-MCNC: 22 MG/DL (ref 6–20)
BUN/CREAT SERPL: 12 (ref 12–20)
CALCIUM SERPL-MCNC: 7.7 MG/DL (ref 8.5–10.1)
CHLORIDE SERPL-SCNC: 101 MMOL/L (ref 97–108)
CO2 SERPL-SCNC: 26 MMOL/L (ref 21–32)
CREAT SERPL-MCNC: 1.77 MG/DL (ref 0.7–1.3)
GLUCOSE BLD STRIP.AUTO-MCNC: 305 MG/DL (ref 65–117)
GLUCOSE BLD STRIP.AUTO-MCNC: 322 MG/DL (ref 65–117)
GLUCOSE BLD STRIP.AUTO-MCNC: 336 MG/DL (ref 65–117)
GLUCOSE BLD STRIP.AUTO-MCNC: 85 MG/DL (ref 65–117)
GLUCOSE SERPL-MCNC: 285 MG/DL (ref 65–100)
HGB BLD-MCNC: 8.8 G/DL (ref 12.1–17)
POTASSIUM SERPL-SCNC: 3.9 MMOL/L (ref 3.5–5.1)
SERVICE CMNT-IMP: ABNORMAL
SERVICE CMNT-IMP: NORMAL
SODIUM SERPL-SCNC: 131 MMOL/L (ref 136–145)

## 2022-02-14 PROCEDURE — 80048 BASIC METABOLIC PNL TOTAL CA: CPT

## 2022-02-14 PROCEDURE — 74176 CT ABD & PELVIS W/O CONTRAST: CPT

## 2022-02-14 PROCEDURE — 85018 HEMOGLOBIN: CPT

## 2022-02-14 PROCEDURE — 74011636637 HC RX REV CODE- 636/637: Performed by: STUDENT IN AN ORGANIZED HEALTH CARE EDUCATION/TRAINING PROGRAM

## 2022-02-14 PROCEDURE — 74011250636 HC RX REV CODE- 250/636: Performed by: PHYSICIAN ASSISTANT

## 2022-02-14 PROCEDURE — 74011250637 HC RX REV CODE- 250/637: Performed by: PHYSICIAN ASSISTANT

## 2022-02-14 PROCEDURE — C9113 INJ PANTOPRAZOLE SODIUM, VIA: HCPCS | Performed by: INTERNAL MEDICINE

## 2022-02-14 PROCEDURE — 74011250637 HC RX REV CODE- 250/637: Performed by: STUDENT IN AN ORGANIZED HEALTH CARE EDUCATION/TRAINING PROGRAM

## 2022-02-14 PROCEDURE — 74011000250 HC RX REV CODE- 250: Performed by: INTERNAL MEDICINE

## 2022-02-14 PROCEDURE — 74011250636 HC RX REV CODE- 250/636: Performed by: INTERNAL MEDICINE

## 2022-02-14 PROCEDURE — 74011000258 HC RX REV CODE- 258: Performed by: STUDENT IN AN ORGANIZED HEALTH CARE EDUCATION/TRAINING PROGRAM

## 2022-02-14 PROCEDURE — 82962 GLUCOSE BLOOD TEST: CPT

## 2022-02-14 PROCEDURE — 74011250636 HC RX REV CODE- 250/636: Performed by: STUDENT IN AN ORGANIZED HEALTH CARE EDUCATION/TRAINING PROGRAM

## 2022-02-14 RX ORDER — SUCRALFATE 1 G/1
1 TABLET ORAL
Qty: 40 TABLET | Refills: 0 | Status: SHIPPED | OUTPATIENT
Start: 2022-02-14 | End: 2022-02-24

## 2022-02-14 RX ORDER — OXYCODONE HYDROCHLORIDE 5 MG/1
5 TABLET ORAL
Qty: 5 TABLET | Refills: 0 | Status: SHIPPED | OUTPATIENT
Start: 2022-02-14 | End: 2022-02-23 | Stop reason: SDUPTHER

## 2022-02-14 RX ORDER — SULFAMETHOXAZOLE AND TRIMETHOPRIM 800; 160 MG/1; MG/1
2 TABLET ORAL 2 TIMES DAILY
Qty: 32 TABLET | Refills: 0 | Status: SHIPPED | OUTPATIENT
Start: 2022-02-14 | End: 2022-02-22

## 2022-02-14 RX ORDER — INSULIN GLARGINE 100 [IU]/ML
INJECTION, SOLUTION SUBCUTANEOUS
Qty: 35 ML | Refills: 4 | Status: ON HOLD | OUTPATIENT
Start: 2022-02-14 | End: 2022-03-12 | Stop reason: SDUPTHER

## 2022-02-14 RX ORDER — AMLODIPINE BESYLATE 5 MG/1
5 TABLET ORAL DAILY
Qty: 30 TABLET | Refills: 1 | Status: SHIPPED | OUTPATIENT
Start: 2022-02-15 | End: 2022-05-19 | Stop reason: SDUPTHER

## 2022-02-14 RX ORDER — CARVEDILOL 12.5 MG/1
12.5 TABLET ORAL 2 TIMES DAILY WITH MEALS
Qty: 60 TABLET | Refills: 1 | Status: SHIPPED | OUTPATIENT
Start: 2022-02-14 | End: 2022-05-19 | Stop reason: SDUPTHER

## 2022-02-14 RX ORDER — INSULIN LISPRO 100 [IU]/ML
10 INJECTION, SOLUTION INTRAVENOUS; SUBCUTANEOUS
Status: DISCONTINUED | OUTPATIENT
Start: 2022-02-14 | End: 2022-02-14 | Stop reason: HOSPADM

## 2022-02-14 RX ORDER — FINASTERIDE 5 MG/1
5 TABLET, FILM COATED ORAL DAILY
Qty: 30 TABLET | Refills: 2 | Status: SHIPPED | OUTPATIENT
Start: 2022-02-15 | End: 2022-07-22 | Stop reason: SDUPTHER

## 2022-02-14 RX ORDER — INSULIN GLARGINE 100 [IU]/ML
35 INJECTION, SOLUTION SUBCUTANEOUS DAILY
Status: DISCONTINUED | OUTPATIENT
Start: 2022-02-14 | End: 2022-02-14 | Stop reason: HOSPADM

## 2022-02-14 RX ORDER — INSULIN ASPART 100 [IU]/ML
10 INJECTION, SOLUTION INTRAVENOUS; SUBCUTANEOUS
Qty: 10 ADJUSTABLE DOSE PRE-FILLED PEN SYRINGE | Refills: 3 | Status: ON HOLD | OUTPATIENT
Start: 2022-02-14 | End: 2022-03-12 | Stop reason: SDUPTHER

## 2022-02-14 RX ORDER — INSULIN LISPRO 100 [IU]/ML
4 INJECTION, SOLUTION INTRAVENOUS; SUBCUTANEOUS
Status: DISCONTINUED | OUTPATIENT
Start: 2022-02-14 | End: 2022-02-14

## 2022-02-14 RX ADMIN — METOCLOPRAMIDE HYDROCHLORIDE 5 MG: 5 INJECTION INTRAMUSCULAR; INTRAVENOUS at 03:23

## 2022-02-14 RX ADMIN — MORPHINE SULFATE 2 MG: 2 INJECTION, SOLUTION INTRAMUSCULAR; INTRAVENOUS at 15:13

## 2022-02-14 RX ADMIN — Medication 7 UNITS: at 09:32

## 2022-02-14 RX ADMIN — Medication 7 UNITS: at 13:37

## 2022-02-14 RX ADMIN — INSULIN GLARGINE 35 UNITS: 100 INJECTION, SOLUTION SUBCUTANEOUS at 10:31

## 2022-02-14 RX ADMIN — Medication 10 UNITS: at 13:37

## 2022-02-14 RX ADMIN — MORPHINE SULFATE 2 MG: 2 INJECTION, SOLUTION INTRAMUSCULAR; INTRAVENOUS at 10:31

## 2022-02-14 RX ADMIN — SODIUM CHLORIDE 40 MG: 9 INJECTION INTRAMUSCULAR; INTRAVENOUS; SUBCUTANEOUS at 09:32

## 2022-02-14 RX ADMIN — SUCRALFATE 1 G: 1 TABLET ORAL at 13:38

## 2022-02-14 RX ADMIN — AMLODIPINE BESYLATE 5 MG: 5 TABLET ORAL at 09:32

## 2022-02-14 RX ADMIN — METOCLOPRAMIDE HYDROCHLORIDE 5 MG: 5 INJECTION INTRAMUSCULAR; INTRAVENOUS at 15:13

## 2022-02-14 RX ADMIN — METOCLOPRAMIDE HYDROCHLORIDE 5 MG: 5 INJECTION INTRAMUSCULAR; INTRAVENOUS at 09:32

## 2022-02-14 RX ADMIN — SUCRALFATE 1 G: 1 TABLET ORAL at 18:45

## 2022-02-14 RX ADMIN — SUCRALFATE 1 G: 1 TABLET ORAL at 09:32

## 2022-02-14 RX ADMIN — CEFTRIAXONE SODIUM 2 G: 2 INJECTION, POWDER, FOR SOLUTION INTRAMUSCULAR; INTRAVENOUS at 10:32

## 2022-02-14 RX ADMIN — CARVEDILOL 12.5 MG: 12.5 TABLET, FILM COATED ORAL at 18:45

## 2022-02-14 RX ADMIN — CARVEDILOL 12.5 MG: 12.5 TABLET, FILM COATED ORAL at 10:35

## 2022-02-14 RX ADMIN — FINASTERIDE 5 MG: 5 TABLET, FILM COATED ORAL at 09:32

## 2022-02-14 NOTE — PROGRESS NOTES
End of Shift Note    Bedside shift change report given to [address] (oncoming nurse) by Rafael Robles (offgoing nurse). Report included the following information SBAR, Kardex and MAR    Shift worked:  night     Shift summary and any significant changes:     Morphine given once with relief. Potential DC today. Concerns for physician to address:       Zone phone for oncoming shift:   [phone]       Activity:  Activity Level: Bath Room Privileges,Up with Assistance  Number times ambulated in hallways past shift: 0  Number of times OOB to chair past shift: 0    Cardiac:   Cardiac Monitoring: Yes      Cardiac Rhythm: Sinus Rhythm    Access:   Current line(s): PIV     Genitourinary:   Urinary status: voiding and hansen    Respiratory:   O2 Device: None (Room air)  Chronic home O2 use?: NO  Incentive spirometer at bedside: NO     GI:  Last Bowel Movement Date: 02/08/22  Current diet:  DIET ONE TIME MESSAGE  ADULT DIET Regular; GI [place] (GERD/Peptic Ulcer); dislikes chicken breast  Passing flatus: YES  Tolerating current diet: YES       Pain Management:   Patient states pain is manageable on current regimen: YES    Skin:  Corey Score: 20  Interventions: increase time out of bed and internal/external urinary devices    Patient Safety:  Fall Score:  Total Score: 3  Interventions: bed/chair alarm, gripper socks and pt to call before getting OOB  High Fall Risk: Yes    Length of Stay:  Expected LOS: 3d 19h  Actual LOS: [address]

## 2022-02-14 NOTE — DIABETES MGMT
[facility] SPECIALIST   Discharge Summary    Initial Presentation   Mary Clay is a 46 y.o. male admitted 2/8/22 with weakness, SOB,  nausea and vomiting x 3 days. The patient became concerned when emesis became bloody. He called EMS. The reports last taking insulin 3 days ago and when monitoring BG's the metered just read \"HI\". Admission glucose 911. A1C 13 %  Anion Gap. Creatine 2.42. GFR  30. DKA. The patient was found to be Covid positive toady (2/9/22). HX:   Past Medical History:   Diagnosis Date    Bipolar 1 disorder, depressed ([address]     Bipolar disorder ([address]     Depression     Diabetes ([address]     DKA, type 1 ([address] 1/27/2013    diagnosed age 23    H/O noncompliance with medical treatment, presenting hazards to health     MRSA (methicillin resistant staph aureus) culture positive     MRSA (methicillin resistant Staphylococcus aureus)     Face    Noncompliance with medication regimen     Second hand smoke exposure     Seizure ([address]     Seizures ([address] 2006 or 2007    one episode during residential        INITIAL DX:   DKA (diabetic ketoacidosis) ([address] [E11.10]     Current Treatment     TX: ABx. Insulin. Crestor. GI prophylaxis. Coreg. Consulted by Provider for advanced diabetes nursing assessment and care for:   [x] Transitioning off Ranelle Stalling   [x] Inpatient management strategy  [x] Home management assessment  [] Survival skill education    Hospital Course   Clinical progress has been complicated by DKA.   9/3/43 Patient schedule for upper endoscopy today per GI ( tested positive for Covid today)  2/10/22 He remains symptomatic from COVID infection, hold off on EGD unless he has hemodynamically unstable bleeding. 2/11/22 GI and urology continues to follow this patient. He is eating some, but prefers a liquid or bland diet. Diet order to be changed today. 2/14/22 Eating well. Expected to be discharged today.        Diabetes History   The patient reports a 20 year history of Type 1 diabetes. He has a positive family hx of diabetes (mom & niece). He reports taking 30 units of Lantus daily and 15 ( sliding scale) units of Humalog with meals. He follows with a PCP,  Nina Schwarz MD, for diabetes management. Diabetes-related Medical History  Acute complications  DKA  Neurological complications  Peripheral neuropathy  Microvascular disease  Retinopathy    Diabetes Medication History  Key Antihyperglycemic Medications             insulin aspart U-100 (NovoLOG Flexpen U-100 Insulin) 100 unit/mL (3 mL) inpn 8 Units by SubCUTAneous route Before breakfast, lunch, and dinner. (Novolog or Humalog, whichever more preferred: Inject 5 units with each meal + correction insulin, up to 30 units per day    insulin glargine (Lantus Solostar U-100 Insulin) 100 unit/mL (3 mL) inpn INJECT 30 UNITS SUBCUTANEOUSLY DAILY           Taking medications pattern  [x] Consistent administration  [x] Affordable  Social determinants of health impacting diabetes self-management practices   Concerned that you need to know more about how to stay healthy with diabetes  Overall evaluation:    [x] Not achieving A1c target with drug therapy & self-care practices    Subjective   Did not enter; Covid isolation precautions, spoke with patient on the phone     Objective   Physical exam  General Normal weight male.  Conversant and cooperative  Neuro  Alert, oriented   Vital Signs   Visit Vitals  BP (!) 166/104   Pulse 87   Temp 97.8 °F (36.6 °C)   Resp 12   Ht 5' 9\" (1.753 m)   Wt 66.2 kg (146 lb)   SpO2 100%   BMI 21.56 kg/m²         Laboratory  Recent Labs     02/14/22  0018 02/13/22  0101 02/12/22  0337   * 294* 318*   AGAP 4* 3* 5   WBC  --  6.1  --    CREA 1.77* 1.63* 1.87*   GFRNA 43* 47* 40*       Factors impacting BG management  Factor Dose Comments   Nutrition:  Standard meals      GI bland    eating well   Infection Rocephin 2g q 24hrs    Other:   Kidney function GFR 43     Blood glucose pattern      Significant diabetes-related events over the past 24-72 hours  The patient remains on glucostabilizer. Anion Gap is closed x 2. His current BG is 105. Tested positive for Covid today. 2/11/22 had a hypoglycemic episode in the early morning, ate very little dinner. Lantus dose reduced. Current BG is 120.    2/14/22  BG's elevated today    Assessment and Cecilio   Nursing Diagnosis Risk for unstable blood glucose pattern   Nursing Intervention Domain 5250 Decision-making Support   Nursing Interventions Examined current inpatient diabetes/blood glucose control   Explored factors facilitating and impeding inpatient management  Explored corrective strategies with patient and responsible inpatient provider   Informed patient of rational for insulin strategy while hospitalized     Nursing Diagnosis 35992 Ineffective Health Management   Nursing Intervention Domain 12 Decision-makingSupport   Nursing Interventions Identified diabetes self-management practices impeding diabetes control  Discussed diabetes survival skills related to  1. Healthy Plate eating plan; given handouts  2. Role of physical activity in improving insulin sensitivity and action  3. Procedure for blood glucose monitoring & options for low-cost products available from [facility]   4. Medications plan at discharge     Evaluation   This 46year old ,  male patient with Type 1 diabetes did not achieve BG control prior to admission as evidenced by A1C of 13%. He has a hx of DKA. The patient on gluco stabilizer yesterday evening. He has required between 1 - 30 units of insulin per hour since starting glucostabilizer. The patient was taking 30 units Lantus daily and up to 15 units of Humalog with meals PTA. He reports stable BG's at home until 3 days ago, however the A1C suggests BG's have been unstable.  The aptient has a hx of A1c's above goal.  I recommend transitioning off Gluco stabilzer with 20 units of Lantus ( .3 x 66.2kg). I would like to monitor BG's trends this evening and make recommendation on basal dose and mealtime dose if the patient is eating in the morning.    2/11/22 Patient had a hypoglycemic episode in the early morning. The patient ate very little yesterday. He has been changed to GI bland diet at his request and is 1-25% of meals today. The patient's Lantus dose has been reduced to 10 units of Lantus daily and his BG's are now in the 120's. I suspect that this dosing may not be enough to cover basal needs. I recommend starting low at 0.2 and giving 14 units Lantus daily. 2/14/22 Patient BG's are elevated today 285-305. He is eating well. The patient was taking at least 8 units of Humalog with meals prior to admission and I suspect he will need to continue that inpatient. I recommend to continue on 35 units of lantus daily and add 10 Humalog with meals. This patient would benefit from diabetes self-management education and support [facility]IRE[facility]) after discharge. Discharge Recommendations     1. 35 units Lantus daily  2. 10 units Humalog with consumed meals  3. Follow-up with PCP      [x] Referral to  [x] [facility] (Phone [phone] to schedule appointment) for [facility]    Billing Code(s)   [] 89952 IP subsequent hospital care - 35 minutes [] 29979 Prolonged Services - 65 minutes [] 07402 Prolonged Services - 110 minutes  [x] 62347 IP subsequent hospital care - 25 minutes [] 16812 Prolonged Services - 55 minutes [] 53677 Prolonged Services - 100 minutes  [] 87421 IP subsequent hospital care - 15 minutes [] 58627 Prolonged Services - 45 minutes [] 02407 Prolonged Services - 90 minutes    Before making these care recommendations, I personally reviewed the hospitalization record, including notes, laboratory & diagnostic data and current medications, and examined the patient at the bedside (circumstances permitting) before making care recommendations.  More than fifty (50) percent of the time was spent in patient counseling and/or care coordination.   Total minutes: 25    Arcelia Wong CNS  Diabetes Clinical Nurse Specialist  [facility]  Access via [address] [place] [address]

## 2022-02-14 NOTE — PROGRESS NOTES
Transition of Care Plan:     RUR: 21% - high risk  Disposition: Home with family assistance   Follow up appointments: PCP and specialist as indicated   DME needed: none  [facility]portation at [facility] will transport   [place] or means to access home: Pt's mother has      IM Medicare Letter: n/a - medicaid   Is patient a BCPI-A Bundle:                      If yes, was Bundle Letter given?:    Is patient a [place] and connected with the [address]  If yes, was [employer] transfer form completed and [address] [facility] notified? Caregiver Contact: Mother - Pedro Del Cid - [phone] (phone number is out of service) OR Friend - Negin Arriaga - [phone] Prakash Shepherd is pt's child's mother - was not aware pt was in hospital, could not verify pt's mother's phone number).   Discharge Caregiver contacted prior to discharge? To be contacted  Care Conference needed?: Not indicated at this time     Initial note:  Chart reviewed. CM contacted pt to discuss d/c plan. He is agreeable to d/c. His uncle will pick him up after dinner. The pt is ready to go from a CM standpoint.     Care Management Interventions  PCP Verified by CM: No  Palliative Care Criteria Met (RRAT>21 & CHF Dx)?: No  Mode of Transport at Discharge:  (uncle will transport)  Transition of Care Consult (CM Consult): Discharge Planning  Discharge Durable Medical Equipment: No  Physical Therapy Consult: Yes  Occupational Therapy Consult: Yes  Speech Therapy Consult: No  Support Systems: Other Family Member(s),Parent(s)  Confirm Follow Up Transport: Self  The Patient and/or Patient Representative was Provided with a Choice of Provider and Agrees with the Discharge Plan?: Yes  Freedom of Choice List was Provided with Basic Dialogue that Supports the Patient's Individualized Plan of Care/Goals, Treatment Preferences and Shares the Quality Data Associated with the Providers?: No  [place] Resource Information Provided?: No  Discharge Location  Patient Expects to be Discharged to[de-identified] DALE Wilder  Care Manager  [phone]

## 2022-02-14 NOTE — DISCHARGE SUMMARY
Hospitalist Discharge Summary     Patient ID:  Allison Diaz  [id number]  3 y.o.  1980 2/8/2022    PCP on record: Sabrina Jimenez MD    Admit date: 2/8/2022  Discharge date and time: 2/14/2022    DISCHARGE DIAGNOSIS:    Diabetes mellitus type 1 with DKA POA  Urinary retention and severe right hydronephrosis   FELECIA likely secondary to above on CKD stage 3 - POA  Hx of urinary retention and severe right hydronephrosis in 71/99/85  BPH  Complicated Klebsiella pneumoniae UTI -POA  Nausea/vomiting with abdominal pain POA, likely due to DKA -resolved  ? GI bleed with reported hematemesis POA  COVID-19 infection  Essential HTN POA  Hyperlipidemia POA  Right hip pain    CONSULTATIONS:  IP CONSULT TO GASTROENTEROLOGY  IP CONSULT TO UROLOGY  IP CONSULT TO INFECTIOUS DISEASES    Excerpted HPI from H&P of Daisy Bean MD:  72-year-old male     Vaccinated against COVID-19 x2, denies receiving a booster              Recent COVID-19 PCR negative on 2/6/2022 at [facility] ED     Known type I diabetic with recurrent DKA in the past, last DKA admission was early December 2021  Past several months he has had intermittent suprapubic discomfort and intermittent nausea vomiting that typically last 1 to 2 days. ? Gastroparesis  CT abdomen/pelvis at [address] [place] [address] as outpatient 1/24/2022         1.  No evidence of renal or ureteral calculi or filling defect within the collecting system. 2.  Diffuse circumferential urinary bladder wall thickening and mucosal hyperemia as well as multiple gas locules within the bladder lumen  concerning for cystitis.            3.  Nonspecific prominent retroperitoneal lymph nodes, likely reactive, attention on follow-up.       Seen at [facility] ED 2/6/2022 with abdominal pain, N/V              Not in DKA, but BS elevated to 400, given insulin              COVID-19 PCR was negative              No UA sent              Creatinine 1.45              Given insulin and treated symptomatically, discharged home     Back with persistent nausea vomiting, moderate periumbilical and suprapubic abdominal pain  Positive cough for several days  Multiple episodes of vomiting, one episode at home was bloody              Bloody emesis started only after multiple prior nonbloody episodes  No shortness of breath, chest pain  No headache, fevers, sore throat  No melena or bright red blood per rectum  Because of the worsening abdominal pain, nausea vomiting, lethargy and the bloody emesis  He came into the emergency room     Emergency room  Initially vomiting, 1 episode of hematemesis  Subsequent vomiting and hematemesis have resolved  No melena or bright red blood per rectum   with HCO3 11, AG 29, venous pH 7.25 with pCO2 of 21  Chest x-ray with no airspace disease  New acute kidney injury with BUN 43 and creatinine 2.42  UA with greater than 100 WBCs, 4+ bacteria  Hemoglobin initially 12.0  Received a liter normal saline, started on IV insulin gtt  We were called to admit the patient    ______________________________________________________________________  DISCHARGE SUMMARY/HOSPITAL COURSE:  for full details see H&P, daily progress notes, labs, consult notes. Diabetes mellitus type 1 with DKA POA  -Presents with N/V, abdominal pain. DKA likely precipitated by UTI  -A1c 13. Home regimen: lantus 30 units and sliding scale insulin.     - with HCO3 11, AG 29, venous pH 7.25 with pCO2 of 21 on admission  -S/p IV fluid and insulin gtt per DKA protocol  -His blood glucose control inpatient was suboptimal  -We will increase his home Lantus to 35 units and mealtime lispro to 10 units 3 times daily.     Urinary retention and severe right hydronephrosis   FELECIA likely secondary to above on CKD stage 3 - POA  Hx of urinary retention and severe right hydronephrosis in 12/21/21  BPH  -Baseline creatinine appears to be about 1.2  -Cr 2.3 >> 2.6 >> 1.6 > 1.7  -Beard placed on 2/9 for urinary retention. Patient was retaining 1000 cc  -US showed severe right hydronephrosis, evidence of medical renal disease, and markedly distended bladder  -Urology consulted. Started on Proscar.   -Repeat CT abdomen/pelvis showed decompression of bladder and right hydronephrosis with Beard catheter.  -Will likely need outpatient urodynamic study to r/o neurogenic bladder  -Pt concerned about prostate cancer, screening can be done OP once UTI resolves  -Patient discharged with Beard in place. He is to follow-up with urology at [address] [place] [address] in a week. (Known to them)    Complicated Klebsiella pneumoniae UTI -POA  -Suprapubic pain. Ucx grew klebsiella pneumoniae  -Was initially on empiric ceftriaxone. Antibiotics broadened to Zosyn due to WBC trending up. Antibiotic changed back to Rocephin based on sensitivity. Patient has received a total of 6 days of IV antibiotic inpatient. Changed to p.o. Bactrim to complete a total of 14 days course. Patient's leukocytosis has resolved. -ID and neurology on board     Nausea/vomiting with abdominal pain POA, likely due to DKA -resolved  ? GI bleed with reported hematemesis POA -resolved  Pt reports onset of vomiting then 2 episodes of red blood. Suspect Lillie-Estevez tear  Denies NSAID use. No further bleeding or vomiting since arriving on floor  Hb stable between 8-10. No reported active bleeding or vomiting. He is to continue PPI and Carafate outpatient  GI was consulted. Patient had a screening Covid test prior to procedure and the test came back positive. GI recommends Elective endoscopy once he is out of quarantine as patient hemodynamically stable and no more hematemesis/GI bleed.      COVID-19 infection  -Preprocedure screen Covid positive. -Vaccinated. Asymptomatic. Supportive management     Essential HTN POA  Hyperlipidemia POA  Continue statin. Antibiotic discontinued due to FELECIA on CKD. Amlodipine and beta-blocker added for blood pressure control.   He will need outpatient follow-up and ACE may be resumed for renal protection     Right hip pain  -X-ray unremarkable    ______________________________________________________________________  Patient seen and examined by me on discharge day. Pertinent Findings:  Gen:    Not in distress  Chest: Clear lungs  CVS:   Regular rhythm. No edema  Abd:  Soft, not distended, not tender  Neuro:  Alert and oriented x3  _______________________________________________________________________  DISCHARGE MEDICATIONS:   Current Discharge Medication List      START taking these medications    Details   amLODIPine (NORVASC) 5 mg tablet Take 1 Tablet by mouth daily. Qty: 30 Tablet, Refills: 1  Start date: 2/15/2022      carvediloL (COREG) 12.5 mg tablet Take 1 Tablet by mouth two (2) times daily (with meals). Qty: 60 Tablet, Refills: 1  Start date: 2/14/2022      trimethoprim-sulfamethoxazole (Bactrim DS) 160-800 mg per tablet Take 2 Tablets by mouth two (2) times a day for 8 days. Qty: 32 Tablet, Refills: 0  Start date: 2/14/2022, End date: 2/22/2022      finasteride (PROSCAR) 5 mg tablet Take 1 Tablet by mouth daily. Qty: 30 Tablet, Refills: 2  Start date: 2/15/2022      sucralfate (CARAFATE) 1 gram tablet Take 1 Tablet by mouth Before breakfast, lunch, dinner and at bedtime for 10 days. Qty: 40 Tablet, Refills: 0  Start date: 2/14/2022, End date: 2/24/2022         CONTINUE these medications which have CHANGED    Details   insulin glargine (Lantus Solostar U-100 Insulin) 100 unit/mL (3 mL) inpn INJECT 30 UNITS SUBCUTANEOUSLY DAILY  Qty: 35 mL, Refills: 4  Start date: 2/14/2022    Associated Diagnoses: DM type 2, goal HbA1c < 7% ([facility])      insulin aspart U-100 (NovoLOG Flexpen U-100 Insulin) 100 unit/mL (3 mL) inpn 10 Units by SubCUTAneous route Before breakfast, lunch, and dinner.  (Novolog or Humalog, whichever more preferred: Inject 5 units with each meal + correction insulin, up to 30 units per day  Qty: 10 Adjustable Dose Pre-filled Pen Syringe, Refills: 3  Start date: 2/14/2022         CONTINUE these medications which have NOT CHANGED    Details   rosuvastatin (CRESTOR) 40 mg tablet Take 1 Tablet by mouth nightly. Qty: 30 Tablet, Refills: 0    Associated Diagnoses: Hyperlipidemia, unspecified hyperlipidemia type      ondansetron (ZOFRAN ODT) 4 mg disintegrating tablet Take 1 Tablet by mouth every eight (8) hours as needed for Nausea or Vomiting. Qty: 20 Tablet, Refills: 0      DULoxetine (CYMBALTA) 60 mg capsule Take 1 Capsule by mouth daily. 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 Qty: 30 Tablet, Refills: 0      ergocalciferol (ERGOCALCIFEROL) 1,250 mcg (50,000 unit) capsule Take 1 capsule by mouth once a week  Qty: 12 Capsule, Refills: 0    Associated Diagnoses: Vitamin D deficiency      pregabalin (Lyrica) 75 mg capsule Take 75 mg by mouth two (2) times a day. STOP taking these medications       lisinopriL (PRINIVIL, ZESTRIL) 20 mg tablet Comments:   Reason for Stopping:                 Patient Follow Up Instructions:    Activity: Activity as tolerated  Diet: Diabetic Diet  Wound Care: None needed      Follow-up Information     Follow up With Specialties Details Why Contact Nayla Rojas MD Internal Medicine   [address]      Bryce Salamanca NP  Schedule an appointment as soon as possible for a visit in 1 week  [phone] (Work)  [phone] (Fax)  [address] [place] [address], [facility]  Urology        ________________________________________________________________    Risk of deterioration: Low    Condition at Discharge:  Stable  __________________________________________________________________    Disposition  Home with family, no needs  Declined [place]  ____________________________________________________________________    Code Status: Full Code  ___________________________________________________________________      Total time in minutes spent coordinating this discharge (includes going over instructions, follow-up, prescriptions, and preparing report for sign off to her PCP) :  35 minutes    Signed:  Les Clark MD

## 2022-02-14 NOTE — PROGRESS NOTES
[facility] follow-up PCP transitional care appointment has been scheduled with Dr. Soraida Aceves on 2/16/22 at 1130. Pending patient discharge.   Jatinder Rodas, Care Management Specialist

## 2022-02-14 NOTE — PROGRESS NOTES
Problem: Falls - Risk of  Goal: *Absence of Falls  Description: Document Ra Panda Fall Risk and appropriate interventions in the flowsheet.   Outcome: Progressing Towards Goal  Note: Fall Risk Interventions:  Mobility Interventions: Bed/chair exit alarm,Patient to call before getting OOB         Medication Interventions: Bed/chair exit alarm,Patient to call before getting OOB,Teach patient to arise slowly    Elimination Interventions: Bed/chair exit alarm,Call light in reach,Toileting schedule/hourly rounds

## 2022-02-15 ENCOUNTER — PATIENT OUTREACH (OUTPATIENT)
Dept: CASE MANAGEMENT | Age: 42
End: 2022-02-15

## 2022-02-15 NOTE — ACP (ADVANCE CARE PLANNING)
Advance Care Planning     General Advance Care Planning (ACP) Conversation      Date of Conversation: 2/15/2022  Conducted with: Patient with Decision Making Capacity    Healthcare Decision Maker:     Primary Decision Maker: Stuartparul Helton - Mother - [phone]  Click here to complete [address] including selection of the Healthcare Decision Maker Relationship (ie \"Primary\")          Content/Action Overview:   DECLINED ACP conversation - will revisit periodically     Additional Comments: mother is listed as primary decision maker     Length of Voluntary ACP Conversation in minutes:  <16 minutes (Non-Billable)    Asa Griffin RN

## 2022-02-15 NOTE — PROGRESS NOTES
Care Transitions Initial Call    Call within 2 business days of discharge: Yes     Patient: Yesica Stoddard Patient : 1980 MRN: [id number]    Last Discharge [address] [facility]       Complaint Diagnosis Description Type Department Provider    22 Vomiting Diabetic ketoacidosis without coma associated with type 1 diabetes mellitus ([facility] [address] . .. ED to Hosp-Admission (Discharged) (ADMIT) Dawit Alonso MD; Alek Butler. .. Was this an external facility discharge? No     Challenges to be reviewed by the provider   Additional needs identified to be addressed with provider: yes  medications- med rec done with care transition nurse and mother and patient. ordered to stop lisinopril and started amlodipine. lantus increased to 35 daily and novolog 10 units 3 times per day         Method of communication with provider : chart routing    Discussed COVID-19 related testing which was available at this time. Test results were positive. Patient informed of results, if available? yes     Advance Care Planning:   Does patient have an Advance Directive:  not on file    Inpatient Readmission Risk score: Unplanned Readmit Risk Score: 21.1 ( )    Was this a readmission? no   Patient stated reason for the admission: dka, uti, urinary retention    Patients top risk factors for readmission: financial, level of motivation, medical condition-diabetes, ckd, uti, medication management and support system   Interventions to address risk factors: Scheduled appointment with PCP-22 and Assessment and support for treatment adherence and medication management-new blood pressure medicine amlodipine, need to check bp daily, check bs 3x a day and give insulin 3x a day and lantus 35 units in the evening. wash hansen catheter daily with soap and water and rinse well, complete antibiotic and push po fluids to ensure clear yellow urine.  attend pcp appt virtually on . schedule appt with urology at u in one week to remove catheter. Care Transition Nurse (CTN) contacted the patient by telephone to perform post hospital discharge assessment. Verified name and  with patient as identifiers. Provided introduction to self, and explanation of the CTN role. CTN reviewed discharge instructions, medical action plan and red flags with patient who verbalized understanding. Were discharge instructions available to patient? yes. Reviewed appropriate site of care based on symptoms and resources available to patient including: PCP and Specialist. Patient given an opportunity to ask questions and does not have any further questions or concerns at this time. The patient agrees to contact the PCP office for questions related to their healthcare. Medication reconciliation was performed with patient, who verbalizes understanding of administration of home medications. Advised obtaining a 90-day supply of all daily and as-needed medications. Referral to Pharm D needed: yes     Home Health/Outpatient orders at discharge: none    Durable Medical Equipment ordered at discharge: None      Covid Risk Education    Educated patient about risk for severe COVID-19 due to risk factors according to CDC guidelines. CTN reviewed discharge instructions, medical action plan and red flag symptoms with the patient who verbalized understanding. Discussed COVID vaccination status: yes. Education provided on COVID-19 vaccination as appropriate. Discussed exposure protocols and quarantine with CDC Guidelines. Patient was given an opportunity to verbalize any questions and concerns and agrees to contact CTN or health care provider for questions related to their healthcare. Was patient discharged with a pulse oximeter? No. No oxygen requirement or lung issues      Discussed follow-up appointments. If no appointment was previously scheduled, appointment scheduling offered: yes. Is follow up appointment scheduled within 7 days of discharge? yes.    [facility](s):   Future Appointments   Date Time Provider Caryl Foley   2/16/2022 11:30 AM Mikhail Mittal [place] [facility] [place]   2/24/2022 11:00 AM Kary Lovelace MD [facility] BS AMB     Non-BS follow up appointment(s): joan vizcarra with urology at [facility] in one week. Plan for follow-up call in 5-7 days based on severity of symptoms and risk factors. Plan for next call: follow up appointment-pcp appt, urology appt. scheduled? hansen removed? bs checks? antibiotic complete. CTN provided contact information for future needs. Goals Addressed                 This Visit's Progress     Prevent complications post hospitalization. 2/15/22     Patient to check blood sugars 3 x a day   Patient to check bp daily   Patient to wash hansen with soap and water daily   Patient to attend pcp appt on 2/16 virtual and make appt with urology at [facility] in one week.  Patient to complete antibiotic  and take all other meds as prescribed and listed in dc paperwork.  Ctn to follow up in one week.    [address] Kyl[address] Brandon RN

## 2022-02-15 NOTE — PROGRESS NOTES
Problem: Falls - Risk of  Goal: *Absence of Falls  Description: Document Roberts Fail Fall Risk and appropriate interventions in the flowsheet. Outcome: Resolved/Met  Note: Fall Risk Interventions:  Mobility Interventions: Patient to call before getting OOB    Medication Interventions: Patient to call before getting OOB    Elimination Interventions: Call light in reach    History of Falls Interventions: Bed/chair exit alarm,Consult care management for discharge planning,Evaluate medications/consider consulting pharmacy      Problem: Patient Education: Go to Patient Education Activity  Goal: Patient/Family Education  Outcome: Resolved/Met     Problem: Diabetes Self-Management  Goal: *Disease process and treatment process  Description: Define diabetes and identify own type of diabetes; list 3 options for treating diabetes. Outcome: Resolved/Met  Goal: *Incorporating nutritional management into lifestyle  Description: Describe effect of type, amount and timing of food on blood glucose; list 3 methods for planning meals. Outcome: Resolved/Met  Goal: *Incorporating physical activity into lifestyle  Description: State effect of exercise on blood glucose levels. Outcome: Resolved/Met  Goal: *Developing strategies to promote health/change behavior  Description: Define the ABC's of diabetes; identify appropriate screenings, schedule and personal plan for screenings. Outcome: Resolved/Met  Goal: *Using medications safely  Description: State effect of diabetes medications on diabetes; name diabetes medication taking, action and side effects. Outcome: Resolved/Met  Goal: *Monitoring blood glucose, interpreting and using results  Description: Identify recommended blood glucose targets  and personal targets.   Outcome: Resolved/Met  Goal: *Prevention, detection, treatment of acute complications  Description: List symptoms of hyper- and hypoglycemia; describe how to treat low blood sugar and actions for lowering  high blood glucose level. Outcome: Resolved/Met  Goal: *Prevention, detection and treatment of chronic complications  Description: Define the natural course of diabetes and describe the relationship of blood glucose levels to long term complications of diabetes. Outcome: Resolved/Met  Goal: *Developing strategies to address psychosocial issues  Description: Describe feelings about living with diabetes; identify support needed and support network  Outcome: Resolved/Met  Goal: *Insulin pump training  Outcome: Resolved/Met  Goal: *Sick day guidelines  Outcome: Resolved/Met  Goal: *Patient Specific Goal (EDIT GOAL, INSERT TEXT)  Outcome: Resolved/Met     Problem: Patient Education: Go to Patient Education Activity  Goal: Patient/Family Education  Outcome: Resolved/Met     Problem: Airway Clearance - Ineffective  Goal: Achieve or maintain patent airway  Outcome: Resolved/Met     Problem: Gas Exchange - Impaired  Goal: Absence of hypoxia  Outcome: Resolved/Met  Goal: Promote optimal lung function  Outcome: Resolved/Met     Problem: Breathing Pattern - Ineffective  Goal: Ability to achieve and maintain a regular respiratory rate  Outcome: Resolved/Met     Problem:  Body Temperature -  Risk of, Imbalanced  Goal: Ability to maintain a body temperature within defined limits  Outcome: Resolved/Met  Goal: Will regain or maintain usual level of consciousness  Outcome: Resolved/Met  Goal: Complications related to the disease process, condition or treatment will be avoided or minimized  Outcome: Resolved/Met     Problem: Isolation Precautions - Risk of Spread of Infection  Goal: Prevent transmission of infectious organism to others  Outcome: Resolved/Met     Problem: Nutrition Deficits  Goal: Optimize nutrtional status  Outcome: Resolved/Met     Problem: Risk for Fluid Volume Deficit  Goal: Maintain normal heart rhythm  Outcome: Resolved/Met  Goal: Maintain absence of muscle cramping  Outcome: Resolved/Met  Goal: Maintain normal serum potassium, sodium, calcium, phosphorus, and pH  Outcome: Resolved/Met     Problem: Loneliness or Risk for Loneliness  Goal: Demonstrate positive use of time alone when socialization is not possible  Outcome: Resolved/Met     Problem: Fatigue  Goal: Verbalize increase energy and improved vitality  Outcome: Resolved/Met     Problem: Patient Education: Go to Patient Education Activity  Goal: Patient/Family Education  Outcome: Resolved/Met     Problem: Infection - Risk of, Urinary Catheter-Associated Urinary Tract Infection  Goal: *Absence of infection signs and symptoms  Outcome: Resolved/Met     Problem: Patient Education: Go to Patient Education Activity  Goal: Patient/Family Education  Outcome: Resolved/Met     Problem: Pressure Injury - Risk of  Goal: *Prevention of pressure injury  Description: Document Corey Scale and appropriate interventions in the flowsheet.   Outcome: Resolved/Met  Note: Pressure Injury Interventions:  Sensory Interventions: Float heels,Keep linens dry and wrinkle-free,Minimize linen layers,Maintain/enhance activity level    Moisture Interventions: Absorbent underpads,Internal/External urinary devices    Activity Interventions: Increase time out of bed    Mobility Interventions: HOB 30 degrees or less    Nutrition Interventions: Document food/fluid/supplement intake    Friction and Shear Interventions: Lift sheet,Minimize layers      Problem: Patient Education: Go to Patient Education Activity  Goal: Patient/Family Education  Outcome: Resolved/Met

## 2022-02-15 NOTE — PROGRESS NOTES
DISCHARGE SUMMARY FROM [facility] NURSE    The patient is stable for discharge. I have reviewed the discharge instructions with the patient. The patient verbalized understanding. All questions were fully answered. The patient verbalized no complaints. Hard scripts and medication handouts were given and reviewed with the patient. Appropriate educational materials and medication side effects teaching were also provided. Cardiac monitor and IV line(s) were removed. The following personal items collected during admission were returned to the patient/family  Home medications: None   Dental Appliance: Dental Appliances: None  Vision: Visual Aid: None  Hearing Aid:    Jewelry: Jewelry: Bracelet,Earrings,Necklace  Clothing: Clothing: Footwear,Pants,Shirt  Other Valuables: Other Valuables: None  Valuables sent to safe: There were no personal belongings, valuables or home medications left at patient's bedside,  or safe.      Patient sent home with hansen leg bag

## 2022-02-22 ENCOUNTER — PATIENT OUTREACH (OUTPATIENT)
Dept: CASE MANAGEMENT | Age: 42
End: 2022-02-22

## 2022-02-22 NOTE — PROGRESS NOTES
Care Transitions Follow Up Call    Challenges to be reviewed by the provider   Additional needs identified to be addressed with provider: yes  medications- bactrim bid and hansen still in place. [facility] to contact patient for appt for removal.            Method of communication with provider : chart routing    Care Transition Nurse (CTN) contacted the patient by telephone to follow up after admission on 22. Verified name and  with patient as identifiers. Addressed changes since last contact: none  Follow up appointment completed? no.   Was follow up appointment scheduled within 7 days of discharge? yes. CTN reviewed discharge instructions, medical action plan and red flags with patient and discussed any barriers to care and/or understanding of plan of care after discharge. Discussed appropriate site of care based on symptoms and resources available to patient including: PCP and Specialist. The patient agrees to contact the PCP office for questions related to their healthcare. Patients top risk factors for readmission: financial, lack of knowledge about disease, level of motivation and medical condition-dm, hydronephrosis   Interventions to address risk factors: Education of patient/family/caregiver/guardian to support self-management-check bs 3 x a day, wash hansen daily with soap and water and attend follow up appt with [facility] and pcp on     [facility] follow up appointment(s):   Future Appointments   Date Time Provider Caryl Foley   2022  3:45 Ty Curry MD [facility] BS AMB   2022 11:00 AM Airam Waters MD [facility] BS AMB     [facility] follow up appointment(s): [facility] to call patient and make appt for this week for hansen removal.     CTN provided contact information for future needs. Plan for follow-up call in 5-7 days based on severity of symptoms and risk factors.   Plan for next call: follow up appointment-[facility] with [facility]     Goals Addressed This Visit's Progress     Prevent complications post hospitalization. 2/15/22     Patient to check blood sugars 3 x a day   Patient to check bp daily   Patient to wash hansen with soap and water daily   Patient to attend pcp appt on 2/16 virtual and make appt with urology at u in one week.  Patient to complete antibiotic  and take all other meds as prescribed and listed in dc paperwork.  Ctn to follow up in one week. Lacie Brandon RN      2/22/22   Patient to check blood sugars 3 x a day   Patient to check bp daily   Patient to wash hansen with soap and water daily   Patient to attend pcp appt on 2/23 virtual and ctn called to make appt with urology at u this week.  Patient to complete antibiotic  and take all other meds as prescribed and listed in dc paperwork.  Ctn to follow up in  one week.    Lacie Brandon RN

## 2022-02-23 ENCOUNTER — OFFICE VISIT (OUTPATIENT)
Dept: PRIMARY CARE CLINIC | Age: 42
End: 2022-02-23
Payer: COMMERCIAL

## 2022-02-23 VITALS
WEIGHT: 155.8 LBS | TEMPERATURE: 97.3 F | RESPIRATION RATE: 18 BRPM | HEART RATE: 97 BPM | DIASTOLIC BLOOD PRESSURE: 98 MMHG | HEIGHT: 69 IN | SYSTOLIC BLOOD PRESSURE: 189 MMHG | OXYGEN SATURATION: 98 % | BODY MASS INDEX: 23.08 KG/M2

## 2022-02-23 DIAGNOSIS — N40.1 BENIGN PROSTATIC HYPERPLASIA WITH LOWER URINARY TRACT SYMPTOMS, SYMPTOM DETAILS UNSPECIFIED: ICD-10-CM

## 2022-02-23 DIAGNOSIS — E78.00 HYPERCHOLESTEREMIA: ICD-10-CM

## 2022-02-23 DIAGNOSIS — D63.8 ANEMIA, CHRONIC DISEASE: ICD-10-CM

## 2022-02-23 DIAGNOSIS — E83.51 HYPOCALCEMIA: Primary | ICD-10-CM

## 2022-02-23 DIAGNOSIS — R06.83 SNORING: ICD-10-CM

## 2022-02-23 DIAGNOSIS — M25.551 RIGHT HIP PAIN: ICD-10-CM

## 2022-02-23 DIAGNOSIS — E11.9 DM TYPE 2, GOAL HBA1C < 7% (HCC): ICD-10-CM

## 2022-02-23 DIAGNOSIS — I10 ESSENTIAL HYPERTENSION: ICD-10-CM

## 2022-02-23 DIAGNOSIS — N18.30 STAGE 3 CHRONIC KIDNEY DISEASE, UNSPECIFIED WHETHER STAGE 3A OR 3B CKD (HCC): ICD-10-CM

## 2022-02-23 PROCEDURE — 99214 OFFICE O/P EST MOD 30 MIN: CPT | Performed by: INTERNAL MEDICINE

## 2022-02-23 RX ORDER — OXYCODONE HYDROCHLORIDE 5 MG/1
5 TABLET ORAL
Qty: 20 TABLET | Refills: 0 | Status: SHIPPED | OUTPATIENT
Start: 2022-02-23 | End: 2022-05-19 | Stop reason: SDUPTHER

## 2022-02-23 RX ORDER — NALOXONE HYDROCHLORIDE 4 MG/.1ML
SPRAY NASAL
Qty: 1 EACH | Refills: 0 | Status: SHIPPED | OUTPATIENT
Start: 2022-02-23 | End: 2022-07-12

## 2022-02-23 NOTE — PROGRESS NOTES
Chief Complaint   Patient presents with   [facility] Follow Up     2/8/2022 [facility], trouble walking and falling,          Visit Vitals  BP (!) 189/98 (BP 1 Location: Left upper arm, BP Patient Position: Sitting)   Pulse 97   Temp 97.3 °F (36.3 °C) (Temporal)   Resp 18   Ht 5' 9\" (1.753 m)   Wt 155 lb 12.8 oz (70.7 kg)   SpO2 98%   BMI 23.01 kg/m²        1. Have you been to the ER, urgent care clinic since your last visit? Hospitalized since your last visit? No    2. Have you seen or consulted any other health care providers outside of the [address] [place] [address] since your last visit? Include any pap smears or colon screening.  No

## 2022-02-24 ENCOUNTER — OFFICE VISIT (OUTPATIENT)
Dept: NEUROLOGY | Age: 42
End: 2022-02-24
Payer: COMMERCIAL

## 2022-02-24 VITALS
DIASTOLIC BLOOD PRESSURE: 78 MMHG | OXYGEN SATURATION: 98 % | HEIGHT: 69 IN | BODY MASS INDEX: 22.96 KG/M2 | RESPIRATION RATE: 16 BRPM | WEIGHT: 155 LBS | HEART RATE: 89 BPM | SYSTOLIC BLOOD PRESSURE: 122 MMHG

## 2022-02-24 DIAGNOSIS — E10.42 DIABETIC POLYNEUROPATHY ASSOCIATED WITH TYPE 1 DIABETES MELLITUS (HCC): Primary | ICD-10-CM

## 2022-02-24 DIAGNOSIS — R29.898 RIGHT LEG WEAKNESS: ICD-10-CM

## 2022-02-24 DIAGNOSIS — M54.16 LUMBAR RADICULOPATHY: ICD-10-CM

## 2022-02-24 PROCEDURE — 99205 OFFICE O/P NEW HI 60 MIN: CPT | Performed by: PSYCHIATRY & NEUROLOGY

## 2022-02-24 NOTE — PROGRESS NOTES
Chief Complaint   Patient presents with    Neurologic Problem       HISTORY OF PRESENT ILLNESS  Demond Zhou is a 46 y.o. male with type 1 diabetes that was diagnosed 20 years ago and has related diabetic peripheral neuropathy. He was recently hospitalized for diabetic ketoacidosis, UTI and has an indwelling Beard catheter. Hydronephrosis was noted. He continues to complain of right-sided flank pain and pain within his lower back along with weakness to his right lower extremity which is limiting his ability to walk. He feels that his right leg is weaker proximally. This was his reason for neurological referral  Denies any significant pain, numbness or tingling. He is currently on Lyrica, duloxetine and also narcotic analgesics      Past Medical History:   Diagnosis Date    B[facility] 1 disorder[facility] depressed [facility]     Bipolar disorder ([facility]     Depression     Diabetes ([facility]     DKA, type 1 ([facility] [facility] 1/27/2013    diagnosed age 23    H/O noncompliance with medical treatment, presenting hazards to health     MRSA (methicillin resistant staph aureus) culture positive     MRSA (methicillin resistant Staphylococcus aureus)     Face    Noncompliance with medication regimen     Second hand smoke exposure     Seizure ([facility]     Seizures ([facility] [facility] 2006 or 2007    one episode during prison     Current Outpatient Medications   Medication Sig    oxyCODONE IR (Roxicodone) 5 mg immediate release tablet Take 1 Tablet by mouth every eight (8) hours as needed for Pain for up to 30 days. Max Daily Amount: 15 mg.    naloxone (Narcan) 4 mg/actuation nasal spray Use 1 spray intranasally, then discard. Repeat with new spray every 2 min as needed for opioid overdose symptoms, alternating nostrils.  pen needle, diabetic 30 gauge x 3/16\" ndle 5 Units by Does Not Apply route Before breakfast, lunch, and dinner.     insulin glargine (Lantus Solostar U-100 Insulin) 100 unit/mL (3 mL) inpn INJECT 30 UNITS SUBCUTANEOUSLY DAILY    amLODIPine (NORVASC) 5 mg tablet Take 1 Tablet by mouth daily.  carvediloL (COREG) 12.5 mg tablet Take 1 Tablet by mouth two (2) times daily (with meals).  finasteride (PROSCAR) 5 mg tablet Take 1 Tablet by mouth daily.  sucralfate (CARAFATE) 1 gram tablet Take 1 Tablet by mouth Before breakfast, lunch, dinner and at bedtime for 10 days.  insulin aspart U-100 (NovoLOG Flexpen U-100 Insulin) 100 unit/mL (3 mL) inpn 10 Units by SubCUTAneous route Before breakfast, lunch, and dinner. (Novolog or Humalog, whichever more preferred: Inject 5 units with each meal + correction insulin, up to 30 units per day    rosuvastatin (CRESTOR) 40 mg tablet Take 1 Tablet by mouth nightly.  ondansetron (ZOFRAN ODT) 4 mg disintegrating tablet Take 1 Tablet by mouth every eight (8) hours as needed for Nausea or Vomiting.  DULoxetine (CYMBALTA) 60 mg capsule Take 1 Capsule by mouth daily.  pantoprazole (Protonix) 40 mg tablet Take 1 Tablet by mouth daily.  ergocalciferol (ERGOCALCIFEROL) 1,250 mcg (50,000 unit) capsule Take 1 capsule by mouth once a week    pregabalin (Lyrica) 75 mg capsule Take 75 mg by mouth two (2) times a day. No current facility-administered medications for this visit.      No Known Allergies  Family History   Problem Relation Age of Onset    Diabetes Mother     Diabetes Other         neice, type 1      Social History     Tobacco Use    Smoking status: Former Smoker     Packs/day: 0.10     Years: 16.00     Pack years: 1.60     Types: Cigarettes     Start date: 10/4/1999     Quit date: 2020     Years since quittin.0    Smokeless tobacco: Never Used   Substance Use Topics    Alcohol use: No    Drug use: No     Past Surgical History:   Procedure Laterality Date    HX HEENT      top left wisdom tooth    HX ORTHOPAEDIC Left     wrist; MCV    UPPER GI ENDOSCOPY,BIOPSY  2018              REVIEW OF SYSTEMS  Review of Systems - History obtained from the patient  Psychological ROS: negative  ENT ROS: negative  Hematological and Lymphatic ROS: negative  Endocrine ROS: negative  Respiratory ROS: no cough, shortness of breath, or wheezing  Cardiovascular ROS: no chest pain or dyspnea on exertion  Gastrointestinal ROS: no abdominal pain, change in bowel habits, or black or bloody stools  Genito-Urinary ROS: no dysuria, trouble voiding, or hematuria  Musculoskeletal ROS: Low back pain, right flank pain  Dermatological ROS: negative      PHYSICAL EXAMINATION:    Visit Vitals  /78   Pulse 89   Resp 16   Ht 5' 9\" (1.753 m)   Wt 155 lb (70.3 kg)   SpO2 98%   BMI 22.89 kg/m²     General:  Well groomed individual in no acute distress. Neck: Supple, nontender, no bruits, no pain with resistance to active range of motion. Heart: Regular rate and rhythm. Normal S1S2. Lungs:  Equal chest expansion, no cough, no wheeze  Musculoskeletal:  Extremities revealed no edema and had full range of motion of joints. Psych:  Good mood and bright affect    NEUROLOGICAL EXAMINATION:     Mental Status:   Alert and oriented to person, place, and time with recent and remote memory intact. Attention span and concentration are normal. Speech is fluent. Cranial Nerves:    II, III, IV, VI:  Visual acuity grossly intact. Visual fields are normal.    Pupils are equal, round, and reactive to light. Extra-ocular movements are full and fluid. V-XII: Hearing is grossly intact. Facial features are symmetric, with normal sensation and strength. The palate rises symmetrically and the tongue protrudes midline. Sternocleidomastoids 5/5. Motor Examination: Normal tone, bulk, and strength. 5/5 muscle strength in both upper extremities and left lower extremity. He has proximal weakness 4/5 at the hip flexion on the right. No cogwheel rigidity or clonus present. Sensory exam: Impaired distally to pinprick, temperature, and vibration sense in both lower extremities. Normal proprioception. Coordination:  Finger to nose and rapid arm movement testing was normal.   No resting or intention tremor    Gait and Station: Unsteady, favors his right lower extremity while walking. Walks with a limp. Normal arm swing. Positive Romberg. No muscle wasting or fasiculations noted. Reflexes:  DTRs 2+ throughout. Toes downgoing. LABS / IMAGING  Lab Results   Component Value Date/Time    WBC 6.1 02/13/2022 01:01 AM    Hemoglobin (POC) 18.4 (H) 03/07/2017 09:00 AM    HGB 8.8 (L) 02/14/2022 12:18 AM    Hematocrit (POC) 41 10/22/2020 01:07 PM    HCT 27.0 (L) 02/13/2022 01:01 AM    PLATELET 152 (H) 87/93/3883 01:01 AM    MCV 89.7 02/13/2022 01:01 AM     Lab Results   Component Value Date/Time    Sodium 131 (L) 02/14/2022 12:18 AM    Potassium 3.9 02/14/2022 12:18 AM    Chloride 101 02/14/2022 12:18 AM    CO2 26 02/14/2022 12:18 AM    Anion gap 4 (L) 02/14/2022 12:18 AM    Glucose 285 (H) 02/14/2022 12:18 AM    BUN 22 (H) 02/14/2022 12:18 AM    Creatinine 1.77 (H) 02/14/2022 12:18 AM    BUN/Creatinine ratio 12 02/14/2022 12:18 AM    GFR est AA 52 (L) 02/14/2022 12:18 AM    GFR est non-AA 43 (L) 02/14/2022 12:18 AM    Calcium 7.7 (L) 02/14/2022 12:18 AM    Bilirubin, total 0.5 02/08/2022 07:22 PM    Alk. phosphatase 228 (H) 02/08/2022 07:22 PM    Protein, total 6.5 02/08/2022 07:22 PM    Albumin 1.5 (L) 02/08/2022 07:22 PM    Globulin 5.0 (H) 02/08/2022 07:22 PM    A-G Ratio 0.3 (L) 02/08/2022 07:22 PM    ALT (SGPT) 15 02/08/2022 07:22 PM    AST (SGOT) 7 (L) 02/08/2022 07:22 PM     Lab Results   Component Value Date/Time    Hemoglobin A1c 13.0 (H) 02/08/2022 08:34 PM    Hemoglobin A1c (POC) 13.2 04/01/2021 09:25 AM     Lab Results   Component Value Date/Time    TSH 4.27 (H) 07/14/2021 01:50 PM       ASSESSMENT    ICD-10-CM ICD-9-CM    1. Diabetic polyneuropathy associated with type 1 diabetes mellitus (HCC)  E10.42 250.61      357.2    2.  Right leg weakness  R29.898 729.89 MRI LUMB SPINE WO CONT      EMG NCV MOTOR WO F/WAVE PER NERVE   3. Lumbar radiculopathy  M54.16 724.4 MRI LUMB SPINE WO CONT      EMG NCV MOTOR WO F/WAVE PER NERVE       DISCUSSION  Mr. Mike Dorsey has had a poorly controlled diabetes with recent hospitalization related to diabetic ketoacidosis, UTI, hydronephrosis and still has an indwelling Beard catheter. He has been complaining of flank pain which could be related to the underlying kidney issues but given associated low back pain and clinically noted proximal weakness in the right lower extremity, I have recommended additional work-up including MRI of the lumbar spine to rule out radiculopathy and EMG/NCV. I will review imaging once completed  He also has severe diabetic peripheral neuropathy which is fortunately not very painful but it is impacting his balance  He may benefit from a course of PT after the testing is completed  He is currently taking duloxetine and Lyrica which controlling his symptoms    Thank you for allowing me to participate in the care of Mr. Jose Barahona. Please feel free to contact me if you have any questions. I will be happy to follow to follow him along with you.       Bev Oneal MD  Diplomate, American Board of Psychiatry & Neurology (Neurology)  [facility] Board of Psychiatry & Neurology (Clinical Neurophysiology)  Diplomate, American Board of Electrodiagnostic Medicine

## 2022-02-24 NOTE — PROGRESS NOTES
Andrez Walsh is a 46 y.o.  male and presents with     Chief Complaint   Patient presents with   [facility] Follow Up     2/8/2022 [facility], trouble walking and falling,           Admit date: 2/8/2022  Discharge date and time: 2/14/2022  Reason for admission-abdominal pain, nausea vomiting    History and physical  Patient was admitted with symptoms of abdominal pain nausea and vomiting. He was found to be in DKA and was treated with IV fluids and insulin. He had a UTI urine cultures grew Klebsiella. Anika Gaston He was treated with ceftriaxone  Infectious disease was consulted. Urology was consulted. Beard catheter was placed. CT scan revealed right-sided hydronephrosis but no evidence of kidney stone. DKA was felt to be precipitated by UTI. Patient was seen by nephrology he was felt to have acute on chronic renal failure. .  Diabetes control was suboptimal.  He was placed on 30 units of Lantus. Patient was given 5 tablets of Roxicodone for his right flank pain. To the edition is here for a hospital follow-up. He is accompanied by his wife. Patient reports that he continues to have flank pain. He has a Beard catheter in place. He has appointment with urology in a.. Anika Gaston Patient also has severe pain in his legs and sometimes falls. He has uncontrolled diabetes and neuropathy in his feet  Has appointment with  neurology in a.m. Patient has not seen a nephrologist.  He does snore loudly and stops breathing as per wife. He is requesting pain medication as he is unable to sleep. He reported that he is currently taking 40 units of Lantus and sometimes he has low sugars. Recently his blood sugar was 40. He is requesting pen needles for insulin. CT scan revealed enlarged prostate. PSA has been normal in the past.  Patient has right hip pain. X-ray of the right hip was normal.  Patient reports that he saw pulmonary specialist for a lung nodule.   Repeat CT scan apparently was normal and there were no concerns. As per patient history      Past Medical History:   Diagnosis Date    B[facility] 1 disorder, depressed ([facility]     Bipolar disorder ([facility]     Depression     Diabetes ([facility]     DKA, type 1 ([facility] [address] [facility] 1/27/2013    diagnosed age 23    H/O noncompliance with medical treatment, presenting hazards to health     MRSA (methicillin resistant staph aureus) culture positive     MRSA (methicillin resistant Staphylococcus aureus)     Face    Noncompliance with medication regimen     Second hand smoke exposure     Seizure ([facility]     Seizures ([facility] [address] [facility] 2006 or 2007    one episode during prison     Past Surgical History:   Procedure Laterality Date    HX HEENT      top left wisdom tooth    HX ORTHOPAEDIC Left     wrist; MCV    UPPER GI ENDOSCOPY,BIOPSY  11/20/2018          Current Outpatient Medications   Medication Sig    oxyCODONE IR (Roxicodone) 5 mg immediate release tablet Take 1 Tablet by mouth every eight (8) hours as needed for Pain for up to 30 days. Max Daily Amount: 15 mg.    naloxone (Narcan) 4 mg/actuation nasal spray Use 1 spray intranasally, then discard. Repeat with new spray every 2 min as needed for opioid overdose symptoms, alternating nostrils.  pen needle, diabetic 30 gauge x 3/16\" ndle 5 Units by Does Not Apply route Before breakfast, lunch, and dinner.  insulin glargine (Lantus Solostar U-100 Insulin) 100 unit/mL (3 mL) inpn INJECT 30 UNITS SUBCUTANEOUSLY DAILY    amLODIPine (NORVASC) 5 mg tablet Take 1 Tablet by mouth daily.  carvediloL (COREG) 12.5 mg tablet Take 1 Tablet by mouth two (2) times daily (with meals).  insulin aspart U-100 (NovoLOG Flexpen U-100 Insulin) 100 unit/mL (3 mL) inpn 10 Units by SubCUTAneous route Before breakfast, lunch, and dinner. (Novolog or Humalog, whichever more preferred: Inject 5 units with each meal + correction insulin, up to 30 units per day    finasteride (PROSCAR) 5 mg tablet Take 1 Tablet by mouth daily.     sucralfate (CARAFATE) 1 gram tablet Take 1 Tablet by mouth Before breakfast, lunch, dinner and at bedtime for 10 days.  rosuvastatin (CRESTOR) 40 mg tablet Take 1 Tablet by mouth nightly.  ondansetron (ZOFRAN ODT) 4 mg disintegrating tablet Take 1 Tablet by mouth every eight (8) hours as needed for Nausea or Vomiting.  DULoxetine (CYMBALTA) 60 mg capsule Take 1 Capsule by mouth daily.  pantoprazole (Protonix) 40 mg tablet Take 1 Tablet by mouth daily.  ergocalciferol (ERGOCALCIFEROL) 1,250 mcg (50,000 unit) capsule Take 1 capsule by mouth once a week    pregabalin (Lyrica) 75 mg capsule Take 75 mg by mouth two (2) times a day. No current facility-administered medications for this visit. Health Maintenance   Topic Date Due    Foot Exam Q1  03/07/2021    Flu Vaccine (1) Never done    COVID-19 Vaccine (3 - Booster for Moderna series) 03/13/2022    Depression Screen  04/01/2022    MICROALBUMIN Q1  04/01/2022    A1C test (Diabetic or Prediabetic)  05/08/2022    Eye Exam Retinal or Dilated  06/18/2022    Lipid Screen  11/15/2022    DTaP/Tdap/Td series (2 - Td or Tdap) 03/09/2028    Pneumococcal 0-64 years (2 of 2 - PPSV23) 11/22/2047    Hepatitis C Screening  Completed     Immunization History   Administered Date(s) Administered    (RETIRED) Pneumococcal Vaccine (Unspecified Type) 08/18/2011    COVID-19, [facility], Primary or Immunocompromised Series, MRNA, PF, 100mcg/0.5mL 09/11/2021, 10/13/2021    MMR 03/23/1995    Pneumococcal Polysaccharide (PPSV-23) 08/18/2011    Rabies Vaccine IM 09/17/1992    TD Vaccine 03/08/2011    Tdap 03/09/2018     No LMP for male patient. Allergies and Intolerances:   No Known Allergies    Family History:   Family History   Problem Relation Age of Onset    Diabetes Mother     Diabetes Other         neice, type 1        Social History:   He  reports that he quit smoking about 1 years ago. His smoking use included cigarettes. He started smoking about 22 years ago.  He has a 1.60 pack-year smoking history. He has never used smokeless tobacco.  He  reports no history of alcohol use. Review of Systems:   General: negative for - chills, fatigue, fever, weight change  Psych: negative for - anxiety, depression, irritability or mood swings  ENT: negative for - headaches, hearing change, nasal congestion, oral lesions, sneezing or sore throat  Heme/ Lymph: negative for - bleeding problems, bruising, pallor or swollen lymph nodes  Endo: negative for - hot flashes, polydipsia/polyuria or temperature intolerance  Resp: negative for - cough, shortness of breath or wheezing  CV: negative for - chest pain, edema or palpitations  GI: negative for - abdominal pain, change in bowel habits, constipation, diarrhea or nausea/vomiting  : negative for - dysuria, hematuria, incontinence, pelvic pain or vulvar/vaginal symptoms  MSK: negative for - joint pain, joint swelling or muscle pain  Neuro: negative for - confusion, headaches, seizures or weakness  Derm: negative for - dry skin, hair changes, rash or skin lesion changes          Physical:   Vitals:   Vitals:    02/23/22 1438   BP: (!) 189/98   Pulse: 97   Resp: 18   Temp: 97.3 °F (36.3 °C)   TempSrc: Temporal   SpO2: 98%   Weight: 155 lb 12.8 oz (70.7 kg)   Height: 5' 9\" (1.753 m)           Exam:   HEENT- atraumatic,normocephalic, awake, oriented, well nourished  Neck - supple,no enlarged lymph nodes, no JVD, no thyromegaly  Chest- CTA, no rhonchi, no crackles  Heart- rrr, no murmurs / gallop/rub  Abdomen- soft,BS+,NT, no hepatosplenomegaly, tendenress in rt flank region  Ext - no c/c/edema   Neuro- no focal deficits. Power 5/5 all extremities  Skin - warm,dry, no obvious rashes.           Review of Data:   LABS:   Lab Results   Component Value Date/Time    WBC 6.1 02/13/2022 01:01 AM    HGB 8.8 (L) 02/14/2022 12:18 AM    HCT 27.0 (L) 02/13/2022 01:01 AM    PLATELET 222 (H) 35/45/5128 01:01 AM     Lab Results   Component Value Date/Time Sodium 131 (L) 02/14/2022 12:18 AM    Potassium 3.9 02/14/2022 12:18 AM    Chloride 101 02/14/2022 12:18 AM    CO2 26 02/14/2022 12:18 AM    Glucose 285 (H) 02/14/2022 12:18 AM    BUN 22 (H) 02/14/2022 12:18 AM    Creatinine 1.77 (H) 02/14/2022 12:18 AM     Lab Results   Component Value Date/Time    Cholesterol, total 192 11/15/2021 12:01 PM    HDL Cholesterol 61 11/15/2021 12:01 PM    LDL, calculated 89.4 11/15/2021 12:01 PM    Triglyceride 208 (H) 11/15/2021 12:01 PM     No components found for: GPT        Impression / Plan:        ICD-10-CM ICD-9-CM    1. Hypocalcemia  E83.51 275.41 REFERRAL TO NEPHROLOGY      PTH INTACT   2. Essential hypertension  I10 401.9    3. DM type 2, goal HbA1c < 7% ([facility])  E11.9 250.00 REFERRAL TO ENDOCRINOLOGY      pen needle, diabetic 30 gauge x 3/16\" ndle      REFERRAL TO DIABETIC EDUCATION   4. Stage 3 chronic kidney disease, unspecified whether stage 3a or 3b CKD ([facility])  N18.30 585.3 REFERRAL TO NEPHROLOGY      ERYTHROPOIETIN   5. Hypercholesteremia  E78.00 272.0    6. Benign prostatic hyperplasia with lower urinary tract symptoms, symptom details unspecified  N40.1 600.01    7. Anemia, chronic disease  D63.8 285.29 REFERRAL TO NEPHROLOGY      ERYTHROPOIETIN   8. Right hip pain  M25.551 719.45 oxyCODONE IR (Roxicodone) 5 mg immediate release tablet      naloxone (Narcan) 4 mg/actuation nasal spray   9. Snoring  R06.83 786.09 SLEEP MEDICINE REFERRAL     Advised patient to reduce Lantus to 35 units daily to prevent hypoglycemia. Asked patient to see nephrology, urology, endocrinology and sleep specialist.    Explained to patient risk benefits of the medications. Advised patient to stop meds if having any side effects. Pt verbalized understanding of the instructions. I have discussed the diagnosis with the patient and the intended plan as seen in the above orders. The patient has received an after-visit summary and questions were answered concerning future plans.   I have discussed medication side effects and warnings with the patient as well. I have reviewed the plan of care with the patient, accepted their input and they are in agreement with the treatment goals. Reviewed plan of care. Patient has provided input and agrees with goals. Follow-up and Dispositions    · Return in about 1 month (around 3/23/2022).          Brandon Hammond MD

## 2022-03-01 ENCOUNTER — PATIENT OUTREACH (OUTPATIENT)
Dept: CASE MANAGEMENT | Age: 42
End: 2022-03-01

## 2022-03-02 NOTE — PROGRESS NOTES
Care Transitions Outreach Attempt    Call within 2 business days of discharge: Yes   Attempted to reach patient for transitions of care follow up. Unable to reach patient. Patient: Yesy Javed Patient : 1980 MRN: [id number]    Last Discharge [facility]       Complaint Diagnosis Description Type Department Provider    22 Vomiting Diabetic ketoacidosis without coma associated with type 1 diabetes mellitus ([address] . .. ED to Hosp-Admission (Discharged) (ADMIT) Rigoberto Yen MD; Tracie Saint. .. Was this an external facility discharge? No      Noted following upcoming appointments from discharge chart review:   [facility] follow up appointment(s):   Future Appointments   Date Time Provider Caryl Foley   3/3/2022 10:00 AM Ramakrishna Busby RD [facility] BS AMB   3/17/2022 10:00 AM [facility] BS AMB   3/30/2022 10:00 AM Maykel Kelly MD [facility] BS AMB   2022  2:00 PM Merline Pickles, MD [facility] BS AMB   2022 11:30 AM Haja Nunez MD RDE [facility] BS AMB     [facility] follow up appointment(s): none at this time. Patient has ctn name and number.   Maddie Carter RN, CPN - Care Transition Nurse- [phone]

## 2022-03-03 ENCOUNTER — HOSPITAL ENCOUNTER (EMERGENCY)
Age: 42
Discharge: HOME | End: 2022-03-03
Attending: EMERGENCY MEDICINE
Payer: COMMERCIAL

## 2022-03-03 ENCOUNTER — APPOINTMENT (OUTPATIENT)
Dept: GENERAL RADIOLOGY | Age: 42
End: 2022-03-03
Attending: EMERGENCY MEDICINE
Payer: COMMERCIAL

## 2022-03-03 VITALS
BODY MASS INDEX: 19.98 KG/M2 | WEIGHT: 134.92 LBS | RESPIRATION RATE: 16 BRPM | OXYGEN SATURATION: 99 % | TEMPERATURE: 98.1 F | HEIGHT: 69 IN | HEART RATE: 89 BPM | DIASTOLIC BLOOD PRESSURE: 85 MMHG | SYSTOLIC BLOOD PRESSURE: 145 MMHG

## 2022-03-03 DIAGNOSIS — R11.2 NAUSEA AND VOMITING, INTRACTABILITY OF VOMITING NOT SPECIFIED, UNSPECIFIED VOMITING TYPE: Primary | ICD-10-CM

## 2022-03-03 LAB
ALBUMIN SERPL-MCNC: 1.8 G/DL (ref 3.5–5)
ALBUMIN/GLOB SERPL: 0.3 {RATIO} (ref 1.1–2.2)
ALP SERPL-CCNC: 234 U/L (ref 45–117)
ALT SERPL-CCNC: 23 U/L (ref 12–78)
ANION GAP SERPL CALC-SCNC: 11 MMOL/L (ref 5–15)
AST SERPL-CCNC: 27 U/L (ref 15–37)
ATRIAL RATE: 99 BPM
BASOPHILS # BLD: 0 K/UL (ref 0–0.1)
BASOPHILS NFR BLD: 0 % (ref 0–1)
BILIRUB SERPL-MCNC: 0.4 MG/DL (ref 0.2–1)
BUN SERPL-MCNC: 24 MG/DL (ref 6–20)
BUN/CREAT SERPL: 14 (ref 12–20)
CALCIUM SERPL-MCNC: 9.1 MG/DL (ref 8.5–10.1)
CALCULATED P AXIS, ECG09: 79 DEGREES
CALCULATED R AXIS, ECG10: 96 DEGREES
CALCULATED T AXIS, ECG11: 68 DEGREES
CHLORIDE SERPL-SCNC: 98 MMOL/L (ref 97–108)
CO2 SERPL-SCNC: 23 MMOL/L (ref 21–32)
CREAT SERPL-MCNC: 1.74 MG/DL (ref 0.7–1.3)
DIAGNOSIS, 93000: NORMAL
DIFFERENTIAL METHOD BLD: ABNORMAL
EOSINOPHIL # BLD: 0.1 K/UL (ref 0–0.4)
EOSINOPHIL NFR BLD: 1 % (ref 0–7)
ERYTHROCYTE [DISTWIDTH] IN BLOOD BY AUTOMATED COUNT: 14.4 % (ref 11.5–14.5)
GLOBULIN SER CALC-MCNC: 5.2 G/DL (ref 2–4)
GLUCOSE BLD STRIP.AUTO-MCNC: 282 MG/DL (ref 65–117)
GLUCOSE SERPL-MCNC: 296 MG/DL (ref 65–100)
HCT VFR BLD AUTO: 38.5 % (ref 36.6–50.3)
HGB BLD-MCNC: 13 G/DL (ref 12.1–17)
IMM GRANULOCYTES # BLD AUTO: 0 K/UL (ref 0–0.04)
IMM GRANULOCYTES NFR BLD AUTO: 0 % (ref 0–0.5)
LIPASE SERPL-CCNC: 41 U/L (ref 73–393)
LYMPHOCYTES # BLD: 0.9 K/UL (ref 0.8–3.5)
LYMPHOCYTES NFR BLD: 14 % (ref 12–49)
MCH RBC QN AUTO: 29.9 PG (ref 26–34)
MCHC RBC AUTO-ENTMCNC: 33.8 G/DL (ref 30–36.5)
MCV RBC AUTO: 88.5 FL (ref 80–99)
MONOCYTES # BLD: 0.5 K/UL (ref 0–1)
MONOCYTES NFR BLD: 8 % (ref 5–13)
NEUTS SEG # BLD: 4.7 K/UL (ref 1.8–8)
NEUTS SEG NFR BLD: 77 % (ref 32–75)
NRBC # BLD: 0 K/UL (ref 0–0.01)
NRBC BLD-RTO: 0 PER 100 WBC
P-R INTERVAL, ECG05: 146 MS
PLATELET # BLD AUTO: 504 K/UL (ref 150–400)
PMV BLD AUTO: 9.9 FL (ref 8.9–12.9)
POTASSIUM SERPL-SCNC: 4.9 MMOL/L (ref 3.5–5.1)
PROT SERPL-MCNC: 7 G/DL (ref 6.4–8.2)
Q-T INTERVAL, ECG07: 366 MS
QRS DURATION, ECG06: 84 MS
QTC CALCULATION (BEZET), ECG08: 469 MS
RBC # BLD AUTO: 4.35 M/UL (ref 4.1–5.7)
SERVICE CMNT-IMP: ABNORMAL
SODIUM SERPL-SCNC: 132 MMOL/L (ref 136–145)
TROPONIN-HIGH SENSITIVITY: 6 NG/L (ref 0–76)
VENTRICULAR RATE, ECG03: 99 BPM
WBC # BLD AUTO: 6.1 K/UL (ref 4.1–11.1)

## 2022-03-03 PROCEDURE — 83690 ASSAY OF LIPASE: CPT

## 2022-03-03 PROCEDURE — 80053 COMPREHEN METABOLIC PANEL: CPT

## 2022-03-03 PROCEDURE — 84484 ASSAY OF TROPONIN QUANT: CPT

## 2022-03-03 PROCEDURE — 71046 X-RAY EXAM CHEST 2 VIEWS: CPT

## 2022-03-03 PROCEDURE — 36415 COLL VENOUS BLD VENIPUNCTURE: CPT

## 2022-03-03 PROCEDURE — 74011250636 HC RX REV CODE- 250/636: Performed by: EMERGENCY MEDICINE

## 2022-03-03 PROCEDURE — 96374 THER/PROPH/DIAG INJ IV PUSH: CPT

## 2022-03-03 PROCEDURE — 93005 ELECTROCARDIOGRAM TRACING: CPT

## 2022-03-03 PROCEDURE — 74011000250 HC RX REV CODE- 250: Performed by: EMERGENCY MEDICINE

## 2022-03-03 PROCEDURE — 82962 GLUCOSE BLOOD TEST: CPT

## 2022-03-03 PROCEDURE — 96361 HYDRATE IV INFUSION ADD-ON: CPT

## 2022-03-03 PROCEDURE — 85025 COMPLETE CBC W/AUTO DIFF WBC: CPT

## 2022-03-03 PROCEDURE — 96375 TX/PRO/DX INJ NEW DRUG ADDON: CPT

## 2022-03-03 PROCEDURE — 99285 EMERGENCY DEPT VISIT HI MDM: CPT

## 2022-03-03 RX ORDER — ONDANSETRON 2 MG/ML
4 INJECTION INTRAMUSCULAR; INTRAVENOUS
Status: COMPLETED | OUTPATIENT
Start: 2022-03-03 | End: 2022-03-03

## 2022-03-03 RX ORDER — FAMOTIDINE 20 MG/1
20 TABLET, FILM COATED ORAL
Status: DISCONTINUED | OUTPATIENT
Start: 2022-03-03 | End: 2022-03-03 | Stop reason: SDUPTHER

## 2022-03-03 RX ORDER — METOCLOPRAMIDE 10 MG/1
10 TABLET ORAL
Qty: 12 TABLET | Refills: 0 | Status: SHIPPED | OUTPATIENT
Start: 2022-03-03 | End: 2022-03-13

## 2022-03-03 RX ORDER — ONDANSETRON 8 MG/1
8 TABLET, ORALLY DISINTEGRATING ORAL
Qty: 12 TABLET | Refills: 0 | Status: SHIPPED | OUTPATIENT
Start: 2022-03-03 | End: 2022-05-14

## 2022-03-03 RX ORDER — METOCLOPRAMIDE HYDROCHLORIDE 5 MG/ML
10 INJECTION INTRAMUSCULAR; INTRAVENOUS
Status: COMPLETED | OUTPATIENT
Start: 2022-03-03 | End: 2022-03-03

## 2022-03-03 RX ADMIN — FAMOTIDINE 20 MG: 10 INJECTION, SOLUTION INTRAVENOUS at 19:03

## 2022-03-03 RX ADMIN — METOCLOPRAMIDE 10 MG: 5 INJECTION, SOLUTION INTRAMUSCULAR; INTRAVENOUS at 16:21

## 2022-03-03 RX ADMIN — SODIUM CHLORIDE 1000 ML: 9 INJECTION, SOLUTION INTRAVENOUS at 16:20

## 2022-03-03 RX ADMIN — ONDANSETRON 4 MG: 2 INJECTION INTRAMUSCULAR; INTRAVENOUS at 19:03

## 2022-03-03 NOTE — ED PROVIDER NOTES
EMERGENCY DEPARTMENT HISTORY AND PHYSICAL EXAM      Date: 3/3/2022  Patient Name: Ana Roldan    Please note that this dictation was completed with Judy Diaz, the computer voice recognition software. Quite often unanticipated grammatical, syntax, homophones, and other interpretive errors are inadvertently transcribed by the computer software. Please disregard these errors. Please excuse any errors that have escaped final proofreading. History of Presenting Illness     Chief Complaint   Patient presents with    High Blood Sugar     Pt arrives ambualtory to triage with CC of concern for DKA. Patient reports his glucometer  yesterday and he hasnt checked it since. Patient uses insulin at home. Reports he has been experiencing N/V, dizziness, HA and CP starting yesterday. History Provided By: Patient     HPI: Ana Roldan, 46 y.o. male, with a past medical history significant for insulin-dependent diabetes, gastroparesis, recurrent admissions for DKA presenting the emergency department complaining of nausea, vomiting, epigastric pain, chest pain. Symptoms started yesterday. He has been reports nonbloody nonbilious emesis multiple times associated with some loose stool. Denies any alcohol use, denies any illicit drug use. Denies any fever, admits to chills, fatigue malaise. Thought he was in DKA, sugar here is 290. His glucometer is not working at home    PCP: Sherman Shea MD    No current facility-administered medications on file prior to encounter. Current Outpatient Medications on File Prior to Encounter   Medication Sig Dispense Refill    oxyCODONE IR (Roxicodone) 5 mg immediate release tablet Take 1 Tablet by mouth every eight (8) hours as needed for Pain for up to 30 days. Max Daily Amount: 15 mg. 20 Tablet 0    naloxone (Narcan) 4 mg/actuation nasal spray Use 1 spray intranasally, then discard.  Repeat with new spray every 2 min as needed for opioid overdose symptoms, alternating nostrils. 1 Each 0    pen needle, diabetic 30 gauge x 3/16\" ndle 5 Units by Does Not Apply route Before breakfast, lunch, and dinner. 100 Each 3    insulin glargine (Lantus Solostar U-100 Insulin) 100 unit/mL (3 mL) inpn INJECT 30 UNITS SUBCUTANEOUSLY DAILY 35 mL 4    amLODIPine (NORVASC) 5 mg tablet Take 1 Tablet by mouth daily. 30 Tablet 1    carvediloL (COREG) 12.5 mg tablet Take 1 Tablet by mouth two (2) times daily (with meals). 60 Tablet 1    finasteride (PROSCAR) 5 mg tablet Take 1 Tablet by mouth daily. 30 Tablet 2    insulin aspart U-100 (NovoLOG Flexpen U-100 Insulin) 100 unit/mL (3 mL) inpn 10 Units by SubCUTAneous route Before breakfast, lunch, and dinner. (Novolog or Humalog, whichever more preferred: Inject 5 units with each meal + correction insulin, up to 30 units per day 10 Adjustable Dose Pre-filled Pen Syringe 3    rosuvastatin (CRESTOR) 40 mg tablet Take 1 Tablet by mouth nightly. 30 Tablet 0    ondansetron (ZOFRAN ODT) 4 mg disintegrating tablet Take 1 Tablet by mouth every eight (8) hours as needed for Nausea or Vomiting. 20 Tablet 0    DULoxetine (CYMBALTA) 60 mg capsule Take 1 Capsule by mouth daily. 30 Capsule 3    pantoprazole (Protonix) 40 mg tablet Take 1 Tablet by mouth daily. 30 Tablet 0    ergocalciferol (ERGOCALCIFEROL) 1,250 mcg (50,000 unit) capsule Take 1 capsule by mouth once a week 12 Capsule 0    pregabalin (Lyrica) 75 mg capsule Take 75 mg by mouth two (2) times a day.          Past History     Past Medical History:  Past Medical History:   Diagnosis Date    Bipolar 1 disorder, depressed ([address]     Bipolar disorder ([address]     Depression     Diabetes ([address]     DKA, type 1 ([address] 1/27/2013    diagnosed age 23    H/O noncompliance with medical treatment, presenting hazards to health     MRSA (methicillin resistant staph aureus) culture positive     MRSA (methicillin resistant Staphylococcus aureus)     Face    Noncompliance with medication regimen     Second hand smoke exposure     [facility]     Seizures ([facility] 2006 or 2007    one episode during half-way       Past Surgical History:  Past Surgical History:   Procedure Laterality Date    HX HEENT      top left wisdom tooth    HX ORTHOPAEDIC Left     wrist; MCV    UPPER GI ENDOSCOPY,BIOPSY  2018            Family History:  Family History   Problem Relation Age of Onset    Diabetes Mother     Diabetes Other         neice, type 1        Social History:  Social History     Tobacco Use    Smoking status: Former Smoker     Packs/day: 0.10     Years: 16.00     Pack years: 1.60     Types: Cigarettes     Start date: 10/4/1999     Quit date: 2020     Years since quittin.0    Smokeless tobacco: Never Used   Substance Use Topics    Alcohol use: No    Drug use: No       Allergies:  No Known Allergies      Review of Systems   Review of Systems   Constitutional: Negative for chills and fever. HENT: Negative for congestion and sore throat. Eyes: Negative for visual disturbance. Respiratory: Negative for cough and shortness of breath. Cardiovascular: Positive for chest pain. Negative for leg swelling. Gastrointestinal: Positive for abdominal pain, nausea and vomiting. Negative for blood in stool and diarrhea. Endocrine: Negative for polyuria. Genitourinary: Negative for dysuria and testicular pain. Musculoskeletal: Negative for arthralgias, joint swelling and myalgias. Skin: Negative for rash. Allergic/Immunologic: Negative for immunocompromised state. Neurological: Negative for weakness and headaches. Hematological: Does not bruise/bleed easily. Psychiatric/Behavioral: Negative for confusion. Physical Exam   Physical Exam  Vitals and nursing note reviewed. Constitutional:       Appearance: He is well-developed. HENT:      Head: Normocephalic and atraumatic. Eyes:      General:         Right eye: No discharge. Left eye: No discharge. Conjunctiva/sclera: Conjunctivae normal.      Pupils: Pupils are equal, round, and reactive to light. Neck:      Trachea: No tracheal deviation. Cardiovascular:      Rate and Rhythm: Normal rate and regular rhythm. Heart sounds: Normal heart sounds. No murmur heard. Pulmonary:      Effort: Pulmonary effort is normal. No respiratory distress. Breath sounds: Normal breath sounds. No wheezing or rales. Abdominal:      General: Bowel sounds are normal.      Palpations: Abdomen is soft. Tenderness: There is abdominal tenderness. There is no guarding or rebound. Comments: Epigastric tenderness palpation, no guarding or rebound. No acute abdomen   Musculoskeletal:         General: No tenderness or deformity. Normal range of motion. Cervical back: Normal range of motion and neck supple. Skin:     General: Skin is warm and dry. Findings: No erythema or rash. Neurological:      Mental Status: He is alert and oriented to person, place, and time.    Psychiatric:         Behavior: Behavior normal.         Diagnostic Study Results     Labs -     Recent Results (from the past 12 hour(s))   GLUCOSE, POC    Collection Time: 03/03/22  2:25 PM   Result Value Ref Range    Glucose (POC) 282 (H) 65 - 117 mg/dL    Performed by [facility]    EKG, 12 LEAD, INITIAL    Collection Time: 03/03/22  2:27 PM   Result Value Ref Range    Ventricular Rate 99 BPM    Atrial Rate 99 BPM    P-R Interval 146 ms    QRS Duration 84 ms    Q-T Interval 366 ms    QTC Calculation (Bezet) 469 ms    Calculated P Axis 79 degrees    Calculated R Axis 96 degrees    Calculated T Axis 68 degrees    Diagnosis       Normal sinus rhythm  Possible Left atrial enlargement  When compared with ECG of 09-FEB-2022 13:58,  No significant change was found  Confirmed by Enrique Villalta, P.V. ([id number]) on 3/3/2022 10:47:22 PM     CBC WITH AUTOMATED DIFF    Collection Time: 03/03/22  2:45 PM   Result Value Ref Range    WBC 6.1 4.1 - 11.1 K/uL RBC 4.35 4.10 - 5.70 M/uL    HGB 13.0 12.1 - 17.0 g/dL    HCT 38.5 36.6 - 50.3 %    MCV 88.5 80.0 - 99.0 FL    MCH 29.9 26.0 - 34.0 PG    MCHC 33.8 30.0 - 36.5 g/dL    RDW 14.4 11.5 - 14.5 %    PLATELET 365 (H) 960 - 400 K/uL    MPV 9.9 8.9 - 12.9 FL    NRBC 0.0 0  WBC    ABSOLUTE NRBC 0.00 0.00 - 0.01 K/uL    NEUTROPHILS 77 (H) 32 - 75 %    LYMPHOCYTES 14 12 - 49 %    MONOCYTES 8 5 - 13 %    EOSINOPHILS 1 0 - 7 %    BASOPHILS 0 0 - 1 %    IMMATURE GRANULOCYTES 0 0.0 - 0.5 %    ABS. NEUTROPHILS 4.7 1.8 - 8.0 K/UL    ABS. LYMPHOCYTES 0.9 0.8 - 3.5 K/UL    ABS. MONOCYTES 0.5 0.0 - 1.0 K/UL    ABS. EOSINOPHILS 0.1 0.0 - 0.4 K/UL    ABS. BASOPHILS 0.0 0.0 - 0.1 K/UL    ABS. IMM. GRANS. 0.0 0.00 - 0.04 K/UL    DF AUTOMATED     METABOLIC PANEL, COMPREHENSIVE    Collection Time: 03/03/22  2:45 PM   Result Value Ref Range    Sodium 132 (L) 136 - 145 mmol/L    Potassium 4.9 3.5 - 5.1 mmol/L    Chloride 98 97 - 108 mmol/L    CO2 23 21 - 32 mmol/L    Anion gap 11 5 - 15 mmol/L    Glucose 296 (H) 65 - 100 mg/dL    BUN 24 (H) 6 - 20 MG/DL    Creatinine 1.74 (H) 0.70 - 1.30 MG/DL    BUN/Creatinine ratio 14 12 - 20      GFR est AA 53 (L) >60 ml/min/1.73m2    GFR est non-AA 44 (L) >60 ml/min/1.73m2    Calcium 9.1 8.5 - 10.1 MG/DL    Bilirubin, total 0.4 0.2 - 1.0 MG/DL    ALT (SGPT) 23 12 - 78 U/L    AST (SGOT) 27 15 - 37 U/L    Alk. phosphatase 234 (H) 45 - 117 U/L    Protein, total 7.0 6.4 - 8.2 g/dL    Albumin 1.8 (L) 3.5 - 5.0 g/dL    Globulin 5.2 (H) 2.0 - 4.0 g/dL    A-G Ratio 0.3 (L) 1.1 - 2.2     TROPONIN-HIGH SENSITIVITY    Collection Time: 03/03/22  2:55 PM   Result Value Ref Range    Troponin-High Sensitivity 6 0 - 76 ng/L   LIPASE    Collection Time: 03/03/22  2:55 PM   Result Value Ref Range    Lipase 41 (L) 73 - 393 U/L       Radiologic Studies -   XR CHEST PA LAT   Final Result   No acute cardiopulmonary process.            CT Results  (Last 48 hours)    None        CXR Results  (Last 48 hours) 03/03/22 1542  XR CHEST PA LAT Final result    Impression:  No acute cardiopulmonary process. Narrative:  EXAM:  XR CHEST PA LAT       INDICATION:   chest pain       COMPARISON: Chest radiograph 2/8/2022. FINDINGS: PA and lateral radiographs of the chest were obtained. No evidence of focal consolidation. No pleural effusion or pneumothorax. Heart,   vince, mediastinum are within normal limits. No acute osseous abnormalities. Medical Decision Making   I am the first provider for this patient. I reviewed the vital signs, available nursing notes, past medical history, past surgical history, family history and social history. Vital Signs-Reviewed the patient's vital signs. Patient Vitals for the past 12 hrs:   Temp Pulse Resp BP SpO2   03/03/22 1949 98.1 °F (36.7 °C) 89 16 (!) 145/85 99 %   03/03/22 1423 98.1 °F (36.7 °C) 94 16 (!) 150/89 100 %       EKG interpretation: (Preliminary)  EKG shows sinus rhythm, rate 99. Rightward axis. Normal intervals. No evidence of ST elevation myocardial infarction. LVH. Interpreted by    Records Reviewed:   Nursing notes, Prior visits     Provider Notes (Medical Decision Making): Insulin-dependent diabetic male presenting with nausea vomiting, epigastric pain. Sugar 290, but could still be in DKA given it is been using his insulin at home. He looks nontoxic, but ill. Will provide IV rehydration, antiemetics. Will provide some fluids. Doubt ACS, doubt PE. Will check cardiac biomarkers though with a low clinical suspicion for ACS. Disposition pending laboratory work-up, imaging, symptomatic management    ED Course:   Initial assessment performed. The patients presenting problems have been discussed, and they are in agreement with the care plan formulated and outlined with them. I have encouraged them to ask questions as they arise throughout their visit.     ED Course as of 03/04/22 0043   Thu Mar 03, 2022   4550 Daniela De La Torre Patient feeling better, not in DKA. No evidence of ACS. No need for repeat troponin given symptom duration. No evidence of pancreatitis. No need for any intra-abdominal imaging at this time. [AR]      ED Course User Index  [AR] Tawana España DO               Critical Care Time:   none    Disposition:    DISCHARGE NOTE  Patients results have been reviewed with them. Patient and/or family have verbally conveyed their understanding and agreement of the patient's signs, symptoms, diagnosis, treatment and prognosis and additionally agree to follow up as recommended or return to the Emergency Room should their condition change or have any new concerns prior to their follow-up appointment. Patient verbally agrees with the care-plan and verbally conveys that all of their questions have been answered. Discharge instructions have also been provided to the patient with some educational information regarding their diagnosis as well a list of reasons why they would want to return to the ER prior to their follow-up appointment should their condition change. PLAN:  1. Discharge Medication List as of 3/3/2022  7:30 PM      START taking these medications    Details   metoclopramide HCl (Reglan) 10 mg tablet Take 1 Tablet by mouth every six (6) hours as needed for Nausea for up to 10 days. , Normal, Disp-12 Tablet, R-0      !! ondansetron (ZOFRAN ODT) 8 mg disintegrating tablet Take 1 Tablet by mouth every eight (8) hours as needed for Nausea or Vomiting., Normal, Disp-12 Tablet, R-0       !! - Potential duplicate medications found. Please discuss with provider. CONTINUE these medications which have NOT CHANGED    Details   oxyCODONE IR (Roxicodone) 5 mg immediate release tablet Take 1 Tablet by mouth every eight (8) hours as needed for Pain for up to 30 days. Max Daily Amount: 15 mg., Normal, Disp-20 Tablet, R-0      naloxone (Narcan) 4 mg/actuation nasal spray Use 1 spray intranasally, then discard.  Repeat with new spray every 2 min as needed for opioid overdose symptoms, alternating nostrils. , Normal, Disp-1 Each, R-0      pen needle, diabetic 30 gauge x 3/16\" ndle 5 Units by Does Not Apply route Before breakfast, lunch, and dinner., Normal, Disp-100 Each, R-3      insulin glargine (Lantus Solostar U-100 Insulin) 100 unit/mL (3 mL) inpn INJECT 30 UNITS SUBCUTANEOUSLY DAILY, Normal, Disp-35 mL, R-4      amLODIPine (NORVASC) 5 mg tablet Take 1 Tablet by mouth daily. , Normal, Disp-30 Tablet, R-1      carvediloL (COREG) 12.5 mg tablet Take 1 Tablet by mouth two (2) times daily (with meals). , Normal, Disp-60 Tablet, R-1      finasteride (PROSCAR) 5 mg tablet Take 1 Tablet by mouth daily. , Normal, Disp-30 Tablet, R-2      insulin aspart U-100 (NovoLOG Flexpen U-100 Insulin) 100 unit/mL (3 mL) inpn 10 Units by SubCUTAneous route Before breakfast, lunch, and dinner. (Novolog or Humalog, whichever more preferred: Inject 5 units with each meal + correction insulin, up to 30 units per day, Normal, Disp-10 Adjustable Dose Pre-filled Pen Syringe, R-3      rosuvastatin (CRESTOR) 40 mg tablet Take 1 Tablet by mouth nightly., Normal, Disp-30 Tablet, R-0      !! ondansetron (ZOFRAN ODT) 4 mg disintegrating tablet Take 1 Tablet by mouth every eight (8) hours as needed for Nausea or Vomiting., Normal, Disp-20 Tablet, R-0      DULoxetine (CYMBALTA) 60 mg capsule Take 1 Capsule by mouth daily. , Normal, Disp-30 Capsule, R-3      pantoprazole (Protonix) 40 mg tablet Take 1 Tablet by mouth daily. , Normal, Disp-30 Tablet, R-0      ergocalciferol (ERGOCALCIFEROL) 1,250 mcg (50,000 unit) capsule Take 1 capsule by mouth once a week, Normal, Disp-12 Capsule, R-0      pregabalin (Lyrica) 75 mg capsule Take 75 mg by mouth two (2) times a day., Historical Med       !! - Potential duplicate medications found. Please discuss with provider.         2.   Follow-up Information     Follow up With Specialties Details Why Aguilar Boswell MD Internal Medicine Schedule an appointment as soon as possible for a visit   [address]       [facility] EMERGENCY DEPT Emergency Medicine  If symptoms worsen [address]  [address] [facility]  [phone]          Return to ED if worse     Diagnosis     Clinical Impression:   1. Nausea and vomiting, intractability of vomiting not specified, unspecified vomiting type        Attestations:   This note was completed by Medhat Hernandez DO

## 2022-03-08 ENCOUNTER — HOSPITAL ENCOUNTER (INPATIENT)
Age: 42
LOS: 4 days | Discharge: HOME | DRG: 420 | End: 2022-03-12
Attending: EMERGENCY MEDICINE | Admitting: STUDENT IN AN ORGANIZED HEALTH CARE EDUCATION/TRAINING PROGRAM
Payer: COMMERCIAL

## 2022-03-08 ENCOUNTER — APPOINTMENT (OUTPATIENT)
Dept: GENERAL RADIOLOGY | Age: 42
DRG: 420 | End: 2022-03-08
Attending: EMERGENCY MEDICINE
Payer: COMMERCIAL

## 2022-03-08 ENCOUNTER — APPOINTMENT (OUTPATIENT)
Dept: CT IMAGING | Age: 42
DRG: 420 | End: 2022-03-08
Attending: STUDENT IN AN ORGANIZED HEALTH CARE EDUCATION/TRAINING PROGRAM
Payer: COMMERCIAL

## 2022-03-08 DIAGNOSIS — N17.9 ACUTE KIDNEY INJURY (HCC): ICD-10-CM

## 2022-03-08 DIAGNOSIS — E11.9 DM TYPE 2, GOAL HBA1C < 7% (HCC): ICD-10-CM

## 2022-03-08 DIAGNOSIS — E10.10 DIABETIC KETOACIDOSIS WITHOUT COMA ASSOCIATED WITH TYPE 1 DIABETES MELLITUS (HCC): Primary | ICD-10-CM

## 2022-03-08 PROBLEM — N39.0 UTI (URINARY TRACT INFECTION): Status: ACTIVE | Noted: 2022-03-08

## 2022-03-08 LAB
ADMINISTERED INITIALS, ADMINIT: NORMAL
ALBUMIN SERPL-MCNC: 1.7 G/DL (ref 3.5–5)
ALBUMIN/GLOB SERPL: 0.4 {RATIO} (ref 1.1–2.2)
ALP SERPL-CCNC: 212 U/L (ref 45–117)
ALT SERPL-CCNC: 16 U/L (ref 12–78)
ANION GAP SERPL CALC-SCNC: 25 MMOL/L (ref 5–15)
ANION GAP SERPL CALC-SCNC: 27 MMOL/L (ref 5–15)
APPEARANCE UR: ABNORMAL
AST SERPL-CCNC: 10 U/L (ref 15–37)
ATRIAL RATE: 93 BPM
BACTERIA URNS QL MICRO: ABNORMAL /HPF
BASE DEFICIT BLD-SCNC: 14.1 MMOL/L
BASOPHILS # BLD: 0.1 K/UL (ref 0–0.1)
BASOPHILS NFR BLD: 1 % (ref 0–1)
BILIRUB SERPL-MCNC: 0.5 MG/DL (ref 0.2–1)
BILIRUB UR QL: NEGATIVE
BUN SERPL-MCNC: 28 MG/DL (ref 6–20)
BUN SERPL-MCNC: 34 MG/DL (ref 6–20)
BUN/CREAT SERPL: 14 (ref 12–20)
BUN/CREAT SERPL: 16 (ref 12–20)
CA-I BLD-MCNC: 1.12 MMOL/L (ref 1.12–1.32)
CALCIUM SERPL-MCNC: 8.4 MG/DL (ref 8.5–10.1)
CALCIUM SERPL-MCNC: 8.5 MG/DL (ref 8.5–10.1)
CALCULATED P AXIS, ECG09: 80 DEGREES
CALCULATED R AXIS, ECG10: 109 DEGREES
CALCULATED T AXIS, ECG11: 59 DEGREES
CHLORIDE BLD-SCNC: 98 MMOL/L (ref 100–108)
CHLORIDE SERPL-SCNC: 90 MMOL/L (ref 97–108)
CHLORIDE SERPL-SCNC: 96 MMOL/L (ref 97–108)
CO2 BLD-SCNC: 12 MMOL/L (ref 19–24)
CO2 SERPL-SCNC: 11 MMOL/L (ref 21–32)
CO2 SERPL-SCNC: 9 MMOL/L (ref 21–32)
COLOR UR: ABNORMAL
CREAT SERPL-MCNC: 1.94 MG/DL (ref 0.7–1.3)
CREAT SERPL-MCNC: 2.14 MG/DL (ref 0.7–1.3)
CREAT UR-MCNC: 1.9 MG/DL (ref 0.6–1.3)
D50 ADMINISTERED, D50ADM: 0 ML
D50 ORDER, D50ORD: 0 ML
DIAGNOSIS, 93000: NORMAL
DIFFERENTIAL METHOD BLD: ABNORMAL
EOSINOPHIL # BLD: 0.1 K/UL (ref 0–0.4)
EOSINOPHIL NFR BLD: 1 % (ref 0–7)
EPITH CASTS URNS QL MICRO: ABNORMAL /LPF
ERYTHROCYTE [DISTWIDTH] IN BLOOD BY AUTOMATED COUNT: 14 % (ref 11.5–14.5)
GLOBULIN SER CALC-MCNC: 4.5 G/DL (ref 2–4)
GLSCOM COMMENTS: NORMAL
GLUCOSE BLD STRIP.AUTO-MCNC: 210 MG/DL (ref 65–117)
GLUCOSE BLD STRIP.AUTO-MCNC: 355 MG/DL (ref 65–117)
GLUCOSE BLD STRIP.AUTO-MCNC: 425 MG/DL (ref 65–117)
GLUCOSE BLD STRIP.AUTO-MCNC: 487 MG/DL (ref 65–117)
GLUCOSE BLD STRIP.AUTO-MCNC: 526 MG/DL (ref 65–117)
GLUCOSE BLD STRIP.AUTO-MCNC: 544 MG/DL (ref 65–117)
GLUCOSE BLD STRIP.AUTO-MCNC: 646 MG/DL (ref 74–106)
GLUCOSE SERPL-MCNC: 390 MG/DL (ref 65–100)
GLUCOSE SERPL-MCNC: 601 MG/DL (ref 65–100)
GLUCOSE UR STRIP.AUTO-MCNC: >1000 MG/DL
GLUCOSE, GLC: 210 MG/DL
GLUCOSE, GLC: 355 MG/DL
GLUCOSE, GLC: 425 MG/DL
GLUCOSE, GLC: 526 MG/DL
GLUCOSE, GLC: 544 MG/DL
HCO3 BLDA-SCNC: 10 MMOL/L
HCT VFR BLD AUTO: 37.1 % (ref 36.6–50.3)
HGB BLD-MCNC: 11.9 G/DL (ref 12.1–17)
HGB UR QL STRIP: ABNORMAL
HIGH TARGET, HITG: 250 MG/DL
HYALINE CASTS URNS QL MICRO: ABNORMAL /LPF (ref 0–5)
IMM GRANULOCYTES # BLD AUTO: 0.2 K/UL (ref 0–0.04)
IMM GRANULOCYTES NFR BLD AUTO: 2 % (ref 0–0.5)
INSULIN ADMINSTERED, INSADM: 11 UNITS/HOUR
INSULIN ADMINSTERED, INSADM: 11.8 UNITS/HOUR
INSULIN ADMINSTERED, INSADM: 14 UNITS/HOUR
INSULIN ADMINSTERED, INSADM: 6 UNITS/HOUR
INSULIN ADMINSTERED, INSADM: 9.7 UNITS/HOUR
INSULIN ORDER, INSORD: 11 UNITS/HOUR
INSULIN ORDER, INSORD: 11.8 UNITS/HOUR
INSULIN ORDER, INSORD: 14 UNITS/HOUR
INSULIN ORDER, INSORD: 6 UNITS/HOUR
INSULIN ORDER, INSORD: 9.7 UNITS/HOUR
KETONES UR QL STRIP.AUTO: 80 MG/DL
LACTATE BLD-SCNC: 2.9 MMOL/L (ref 0.4–2)
LEUKOCYTE ESTERASE UR QL STRIP.AUTO: ABNORMAL
LIPASE SERPL-CCNC: 58 U/L (ref 73–393)
LOW TARGET, LOT: 150 MG/DL
LYMPHOCYTES # BLD: 1.4 K/UL (ref 0.8–3.5)
LYMPHOCYTES NFR BLD: 12 % (ref 12–49)
MAGNESIUM SERPL-MCNC: 2.7 MG/DL (ref 1.6–2.4)
MCH RBC QN AUTO: 29.6 PG (ref 26–34)
MCHC RBC AUTO-ENTMCNC: 32.1 G/DL (ref 30–36.5)
MCV RBC AUTO: 92.3 FL (ref 80–99)
MINUTES UNTIL NEXT BG, NBG: 60 MIN
MONOCYTES # BLD: 0.5 K/UL (ref 0–1)
MONOCYTES NFR BLD: 4 % (ref 5–13)
MULTIPLIER, MUL: 0.02
MULTIPLIER, MUL: 0.03
MULTIPLIER, MUL: 0.03
MULTIPLIER, MUL: 0.04
MULTIPLIER, MUL: 0.04
NEUTS SEG # BLD: 8.7 K/UL (ref 1.8–8)
NEUTS SEG NFR BLD: 81 % (ref 32–75)
NITRITE UR QL STRIP.AUTO: NEGATIVE
NRBC # BLD: 0 K/UL (ref 0–0.01)
NRBC BLD-RTO: 0 PER 100 WBC
ORDER INITIALS, ORDINIT: NORMAL
P-R INTERVAL, ECG05: 142 MS
PCO2 BLDV: 23.8 MMHG (ref 41–51)
PH BLDV: 7.25 [PH] (ref 7.32–7.42)
PH UR STRIP: 6 [PH] (ref 5–8)
PLATELET # BLD AUTO: 616 K/UL (ref 150–400)
PMV BLD AUTO: 10.6 FL (ref 8.9–12.9)
PO2 BLDV: 24 MMHG (ref 25–40)
POTASSIUM BLD-SCNC: 4.4 MMOL/L (ref 3.5–5.5)
POTASSIUM SERPL-SCNC: 3.5 MMOL/L (ref 3.5–5.1)
POTASSIUM SERPL-SCNC: 4.2 MMOL/L (ref 3.5–5.1)
PROCALCITONIN SERPL-MCNC: 0.34 NG/ML
PROT SERPL-MCNC: 6.2 G/DL (ref 6.4–8.2)
PROT UR STRIP-MCNC: 300 MG/DL
Q-T INTERVAL, ECG07: 396 MS
QRS DURATION, ECG06: 94 MS
QTC CALCULATION (BEZET), ECG08: 492 MS
RBC # BLD AUTO: 4.02 M/UL (ref 4.1–5.7)
RBC #/AREA URNS HPF: ABNORMAL /HPF (ref 0–5)
SERVICE CMNT-IMP: ABNORMAL
SODIUM BLD-SCNC: 125 MMOL/L (ref 136–145)
SODIUM SERPL-SCNC: 126 MMOL/L (ref 136–145)
SODIUM SERPL-SCNC: 132 MMOL/L (ref 136–145)
SP GR UR REFRACTOMETRY: 1.02 (ref 1–1.03)
SPECIMEN SITE: ABNORMAL
UA: UC IF INDICATED,UAUC: ABNORMAL
UROBILINOGEN UR QL STRIP.AUTO: 0.2 EU/DL (ref 0.2–1)
VENTRICULAR RATE, ECG03: 93 BPM
WBC # BLD AUTO: 10.9 K/UL (ref 4.1–11.1)
WBC URNS QL MICRO: >100 /HPF (ref 0–4)

## 2022-03-08 PROCEDURE — 87077 CULTURE AEROBIC IDENTIFY: CPT

## 2022-03-08 PROCEDURE — C9113 INJ PANTOPRAZOLE SODIUM, VIA: HCPCS | Performed by: STUDENT IN AN ORGANIZED HEALTH CARE EDUCATION/TRAINING PROGRAM

## 2022-03-08 PROCEDURE — 36415 COLL VENOUS BLD VENIPUNCTURE: CPT

## 2022-03-08 PROCEDURE — 65660000000 HC RM CCU STEPDOWN

## 2022-03-08 PROCEDURE — 74176 CT ABD & PELVIS W/O CONTRAST: CPT

## 2022-03-08 PROCEDURE — 96374 THER/PROPH/DIAG INJ IV PUSH: CPT

## 2022-03-08 PROCEDURE — 82962 GLUCOSE BLOOD TEST: CPT

## 2022-03-08 PROCEDURE — 74011636637 HC RX REV CODE- 636/637: Performed by: EMERGENCY MEDICINE

## 2022-03-08 PROCEDURE — 83690 ASSAY OF LIPASE: CPT

## 2022-03-08 PROCEDURE — 85025 COMPLETE CBC W/AUTO DIFF WBC: CPT

## 2022-03-08 PROCEDURE — 74011000258 HC RX REV CODE- 258: Performed by: EMERGENCY MEDICINE

## 2022-03-08 PROCEDURE — 83735 ASSAY OF MAGNESIUM: CPT

## 2022-03-08 PROCEDURE — 74011250636 HC RX REV CODE- 250/636: Performed by: STUDENT IN AN ORGANIZED HEALTH CARE EDUCATION/TRAINING PROGRAM

## 2022-03-08 PROCEDURE — 83036 HEMOGLOBIN GLYCOSYLATED A1C: CPT

## 2022-03-08 PROCEDURE — 81001 URINALYSIS AUTO W/SCOPE: CPT

## 2022-03-08 PROCEDURE — 84145 PROCALCITONIN (PCT): CPT

## 2022-03-08 PROCEDURE — 99285 EMERGENCY DEPT VISIT HI MDM: CPT

## 2022-03-08 PROCEDURE — 80053 COMPREHEN METABOLIC PANEL: CPT

## 2022-03-08 PROCEDURE — 87186 SC STD MICRODIL/AGAR DIL: CPT

## 2022-03-08 PROCEDURE — 74011000258 HC RX REV CODE- 258: Performed by: STUDENT IN AN ORGANIZED HEALTH CARE EDUCATION/TRAINING PROGRAM

## 2022-03-08 PROCEDURE — 71045 X-RAY EXAM CHEST 1 VIEW: CPT

## 2022-03-08 PROCEDURE — 93005 ELECTROCARDIOGRAM TRACING: CPT

## 2022-03-08 PROCEDURE — 74011250636 HC RX REV CODE- 250/636: Performed by: EMERGENCY MEDICINE

## 2022-03-08 PROCEDURE — 87086 URINE CULTURE/COLONY COUNT: CPT

## 2022-03-08 PROCEDURE — 82947 ASSAY GLUCOSE BLOOD QUANT: CPT

## 2022-03-08 PROCEDURE — 96361 HYDRATE IV INFUSION ADD-ON: CPT

## 2022-03-08 PROCEDURE — 74011000250 HC RX REV CODE- 250: Performed by: STUDENT IN AN ORGANIZED HEALTH CARE EDUCATION/TRAINING PROGRAM

## 2022-03-08 PROCEDURE — 87040 BLOOD CULTURE FOR BACTERIA: CPT

## 2022-03-08 RX ORDER — METOCLOPRAMIDE HYDROCHLORIDE 5 MG/ML
5 INJECTION INTRAMUSCULAR; INTRAVENOUS EVERY 6 HOURS
Status: COMPLETED | OUTPATIENT
Start: 2022-03-08 | End: 2022-03-08

## 2022-03-08 RX ORDER — ENOXAPARIN SODIUM 100 MG/ML
40 INJECTION SUBCUTANEOUS DAILY
Status: DISCONTINUED | OUTPATIENT
Start: 2022-03-09 | End: 2022-03-12 | Stop reason: HOSPADM

## 2022-03-08 RX ORDER — SODIUM CHLORIDE 0.9 % (FLUSH) 0.9 %
5-40 SYRINGE (ML) INJECTION EVERY 8 HOURS
Status: DISCONTINUED | OUTPATIENT
Start: 2022-03-08 | End: 2022-03-12 | Stop reason: HOSPADM

## 2022-03-08 RX ORDER — MORPHINE SULFATE 2 MG/ML
1 INJECTION, SOLUTION INTRAMUSCULAR; INTRAVENOUS
Status: DISCONTINUED | OUTPATIENT
Start: 2022-03-08 | End: 2022-03-12 | Stop reason: HOSPADM

## 2022-03-08 RX ORDER — SODIUM CHLORIDE AND POTASSIUM CHLORIDE .9; .15 G/100ML; G/100ML
SOLUTION INTRAVENOUS CONTINUOUS
Status: DISCONTINUED | OUTPATIENT
Start: 2022-03-08 | End: 2022-03-09

## 2022-03-08 RX ORDER — POLYETHYLENE GLYCOL 3350 17 G/17G
17 POWDER, FOR SOLUTION ORAL DAILY PRN
Status: DISCONTINUED | OUTPATIENT
Start: 2022-03-08 | End: 2022-03-12 | Stop reason: SDUPTHER

## 2022-03-08 RX ORDER — SODIUM CHLORIDE 0.9 % (FLUSH) 0.9 %
5-40 SYRINGE (ML) INJECTION EVERY 8 HOURS
Status: DISCONTINUED | OUTPATIENT
Start: 2022-03-08 | End: 2022-03-09

## 2022-03-08 RX ORDER — ACETAMINOPHEN 325 MG/1
650 TABLET ORAL
Status: DISCONTINUED | OUTPATIENT
Start: 2022-03-08 | End: 2022-03-12 | Stop reason: HOSPADM

## 2022-03-08 RX ORDER — DROPERIDOL 2.5 MG/ML
1.25 INJECTION, SOLUTION INTRAMUSCULAR; INTRAVENOUS ONCE
Status: COMPLETED | OUTPATIENT
Start: 2022-03-08 | End: 2022-03-08

## 2022-03-08 RX ORDER — ACETAMINOPHEN 325 MG/1
650 TABLET ORAL
Status: DISCONTINUED | OUTPATIENT
Start: 2022-03-08 | End: 2022-03-12 | Stop reason: SDUPTHER

## 2022-03-08 RX ORDER — POLYETHYLENE GLYCOL 3350 17 G/17G
17 POWDER, FOR SOLUTION ORAL DAILY PRN
Status: DISCONTINUED | OUTPATIENT
Start: 2022-03-08 | End: 2022-03-12 | Stop reason: HOSPADM

## 2022-03-08 RX ORDER — ONDANSETRON 2 MG/ML
4 INJECTION INTRAMUSCULAR; INTRAVENOUS
Status: DISCONTINUED | OUTPATIENT
Start: 2022-03-08 | End: 2022-03-12 | Stop reason: SDUPTHER

## 2022-03-08 RX ORDER — FENTANYL CITRATE 50 UG/ML
50 INJECTION, SOLUTION INTRAMUSCULAR; INTRAVENOUS
Status: COMPLETED | OUTPATIENT
Start: 2022-03-08 | End: 2022-03-09

## 2022-03-08 RX ORDER — FENTANYL CITRATE 50 UG/ML
50 INJECTION, SOLUTION INTRAMUSCULAR; INTRAVENOUS
Status: COMPLETED | OUTPATIENT
Start: 2022-03-08 | End: 2022-03-08

## 2022-03-08 RX ORDER — SODIUM CHLORIDE 0.9 % (FLUSH) 0.9 %
5-40 SYRINGE (ML) INJECTION AS NEEDED
Status: DISCONTINUED | OUTPATIENT
Start: 2022-03-08 | End: 2022-03-09

## 2022-03-08 RX ORDER — POTASSIUM CHLORIDE AND SODIUM CHLORIDE 450; 150 MG/100ML; MG/100ML
INJECTION, SOLUTION INTRAVENOUS CONTINUOUS
Status: DISCONTINUED | OUTPATIENT
Start: 2022-03-08 | End: 2022-03-08

## 2022-03-08 RX ORDER — ACETAMINOPHEN 650 MG/1
650 SUPPOSITORY RECTAL
Status: DISCONTINUED | OUTPATIENT
Start: 2022-03-08 | End: 2022-03-12 | Stop reason: SDUPTHER

## 2022-03-08 RX ORDER — ONDANSETRON 4 MG/1
4 TABLET, ORALLY DISINTEGRATING ORAL
Status: DISCONTINUED | OUTPATIENT
Start: 2022-03-08 | End: 2022-03-12 | Stop reason: HOSPADM

## 2022-03-08 RX ORDER — ENOXAPARIN SODIUM 100 MG/ML
40 INJECTION SUBCUTANEOUS DAILY
Status: DISCONTINUED | OUTPATIENT
Start: 2022-03-09 | End: 2022-03-08

## 2022-03-08 RX ORDER — ONDANSETRON 4 MG/1
4 TABLET, ORALLY DISINTEGRATING ORAL
Status: DISCONTINUED | OUTPATIENT
Start: 2022-03-08 | End: 2022-03-12 | Stop reason: SDUPTHER

## 2022-03-08 RX ORDER — SODIUM CHLORIDE 0.9 % (FLUSH) 0.9 %
5-40 SYRINGE (ML) INJECTION AS NEEDED
Status: DISCONTINUED | OUTPATIENT
Start: 2022-03-08 | End: 2022-03-12 | Stop reason: HOSPADM

## 2022-03-08 RX ORDER — SODIUM CHLORIDE 9 MG/ML
125 INJECTION, SOLUTION INTRAVENOUS CONTINUOUS
Status: DISCONTINUED | OUTPATIENT
Start: 2022-03-08 | End: 2022-03-08

## 2022-03-08 RX ORDER — ONDANSETRON 2 MG/ML
4 INJECTION INTRAMUSCULAR; INTRAVENOUS
Status: DISCONTINUED | OUTPATIENT
Start: 2022-03-08 | End: 2022-03-12 | Stop reason: HOSPADM

## 2022-03-08 RX ORDER — ACETAMINOPHEN 650 MG/1
650 SUPPOSITORY RECTAL
Status: DISCONTINUED | OUTPATIENT
Start: 2022-03-08 | End: 2022-03-12 | Stop reason: HOSPADM

## 2022-03-08 RX ADMIN — METOCLOPRAMIDE 5 MG: 5 INJECTION, SOLUTION INTRAMUSCULAR; INTRAVENOUS at 23:22

## 2022-03-08 RX ADMIN — MORPHINE SULFATE 1 MG: 2 INJECTION, SOLUTION INTRAMUSCULAR; INTRAVENOUS at 17:40

## 2022-03-08 RX ADMIN — CEFTRIAXONE 1 G: 1 INJECTION, POWDER, FOR SOLUTION INTRAMUSCULAR; INTRAVENOUS at 17:31

## 2022-03-08 RX ADMIN — POTASSIUM CHLORIDE AND SODIUM CHLORIDE: 900; 150 INJECTION, SOLUTION INTRAVENOUS at 20:29

## 2022-03-08 RX ADMIN — ONDANSETRON HYDROCHLORIDE 4 MG: 2 INJECTION, SOLUTION INTRAMUSCULAR; INTRAVENOUS at 20:41

## 2022-03-08 RX ADMIN — FENTANYL CITRATE 50 MCG: 0.05 INJECTION, SOLUTION INTRAMUSCULAR; INTRAVENOUS at 14:25

## 2022-03-08 RX ADMIN — FENTANYL CITRATE 50 MCG: 50 INJECTION INTRAMUSCULAR; INTRAVENOUS at 20:41

## 2022-03-08 RX ADMIN — SODIUM CHLORIDE, PRESERVATIVE FREE 10 ML: 5 INJECTION INTRAVENOUS at 21:38

## 2022-03-08 RX ADMIN — SODIUM CHLORIDE, PRESERVATIVE FREE 10 ML: 5 INJECTION INTRAVENOUS at 17:37

## 2022-03-08 RX ADMIN — SODIUM CHLORIDE 6 UNITS/HR: 9 INJECTION, SOLUTION INTRAVENOUS at 23:13

## 2022-03-08 RX ADMIN — SODIUM CHLORIDE 9.7 UNITS/HR: 9 INJECTION, SOLUTION INTRAVENOUS at 17:18

## 2022-03-08 RX ADMIN — DROPERIDOL 1.25 MG: 2.5 INJECTION, SOLUTION INTRAMUSCULAR; INTRAVENOUS at 13:21

## 2022-03-08 RX ADMIN — METOCLOPRAMIDE 5 MG: 5 INJECTION, SOLUTION INTRAMUSCULAR; INTRAVENOUS at 17:32

## 2022-03-08 RX ADMIN — SODIUM CHLORIDE, PRESERVATIVE FREE 10 ML: 5 INJECTION INTRAVENOUS at 17:00

## 2022-03-08 RX ADMIN — SODIUM CHLORIDE 1000 ML: 9 INJECTION, SOLUTION INTRAVENOUS at 13:21

## 2022-03-08 RX ADMIN — SODIUM CHLORIDE 40 MG: 9 INJECTION, SOLUTION INTRAMUSCULAR; INTRAVENOUS; SUBCUTANEOUS at 17:32

## 2022-03-08 NOTE — ED NOTES
Pt received via EMS from home. Pt's mother called EMS with concern for high reading on glucometer and 1 week abdominal pain with nausea and vomiting. PER EMS .   EMS started line in field and pt received approximately 200 cc NS bolus and 1 4mg ODT zofran

## 2022-03-08 NOTE — H&P
Hospitalist Admission Note    NAME: Yesica Stoddard   :  1980   MRN:  [id number]     Date/Time:  3/8/2022 5:08 PM    Patient PCP: Merle Sena MD  ______________________________________________________________________  Given the patient's current clinical presentation, I have a high level of concern for decompensation if discharged from the emergency department. Complex decision making was performed, which includes reviewing the patient's available past medical records, laboratory results, and x-ray films. My assessment of this patient's clinical condition and my plan of care is as follows. Assessment / Plan:    Diabetic ketoacidosis  Noncompliance  Metabolic acidosis  Acute kidney injury  UTI  -Chest x-ray-no acute finding. -CTA abdomen/pelvis pending.  -Anion gap 25. Procalcitonin 0.34.  -Blood sugar 601, CO2 11.  -VBG-7.25/23.8/24/10.  -Started on insulin drip, BMP every 4, magnesium every 4 hours. -CBC in a.m.  -UA suggestive of UTI so started on antibiotics. WBC also elevated. -Blood culture pending.  -Urine culture pending.  -Started on Rocephin.  -Diabetic management consulted. -IV fluids.  -Creatinine 1.94, hydrating the patient.  -Protonix 40 mg IV daily, regular 5 mg every 6 x2 doses. Hypertension  Hyperlipidemia  Anxiety disorder  GERD  -Patient is n.p.o. so holding home medications. Critical Care:  The reason for providing this level of medical care for this critically ill patient was due a critical illness that impaired one or more vital organ systems such that there was a high probability of imminent or life threatening deterioration in the patients condition.  This care involved high complexity decision making to assess, manipulate, and support vital system functions, to treat this degreee vital organ system failure and to prevent further life threatening deterioration of the patients condition    Code Status: Full code  Surrogate Decision Maker:    DVT Prophylaxis: Lovenox  GI Prophylaxis: not indicated    Baseline: Ambulatory at home      Subjective:   CHIEF COMPLAINT: Nausea and vomiting, abdominal pain    HISTORY OF PRESENT ILLNESS:     Adwoa Pena is a 46 y.o.  male who presents with nausea vomiting with abdominal pain for the last 2 weeks. Patient past medical history of diabetes type 1, hypertension, hyperlipidemia, anxiety disorder, GERD. Patient stated for the last 2 weeks he is having nausea and vomiting along with abdominal pain in the epigastric area. Patient states that right now he is having some blood in the vomiting. Denies any fever and chills, no chest pain or shortness of breath, no headache, no dizziness, no other complaints. We were asked to admit for work up and evaluation of the above problems.      Past Medical History:   Diagnosis Date    [facility] depressed [facility]     Bipolar disorder [facility]     Depression     Diabetes ([facility]     DKA, type 1 ([facility] [address] [facility] 2013    diagnosed age 23    H/O noncompliance with medical treatment, presenting hazards to health     MRSA (methicillin resistant staph aureus) culture positive     MRSA (methicillin resistant Staphylococcus aureus)     Face    Noncompliance with medication regimen     Second hand smoke exposure     Seizure ([facility]     Seizures ([facility] [address] [facility] 2006 or 2007    one episode during skilled nursing        Past Surgical History:   Procedure Laterality Date    HX HEENT      top left wisdom tooth    HX ORTHOPAEDIC Left     wrist; [facility]    UPPER GI ENDOSCOPY,BIOPSY  2018            Social History     Tobacco Use    Smoking status: Former Smoker     Packs/day: 0.10     Years: 16.00     Pack years: 1.60     Types: Cigarettes     Start date: 10/4/1999     Quit date: 2020     Years since quittin.0    Smokeless tobacco: Never Used   Substance Use Topics    Alcohol use: No        Family History   Problem Relation Age of Onset    Diabetes Mother    Yumiko Alu Diabetes Other         neice, type 1      No Known Allergies     Prior to Admission medications    Medication Sig Start Date End Date Taking? Authorizing Provider   metoclopramide HCl (Reglan) 10 mg tablet Take 1 Tablet by mouth every six (6) hours as needed for Nausea for up to 10 days. 3/3/22 3/13/22  Ferny Arce, DO   ondansetron (ZOFRAN ODT) 8 mg disintegrating tablet Take 1 Tablet by mouth every eight (8) hours as needed for Nausea or Vomiting. 3/3/22   Ferny Arce, DO   oxyCODONE IR (Roxicodone) 5 mg immediate release tablet Take 1 Tablet by mouth every eight (8) hours as needed for Pain for up to 30 days. Max Daily Amount: 15 mg. 2/23/22 3/25/22  Yumi Holly MD   naloxone (Narcan) 4 mg/actuation nasal spray Use 1 spray intranasally, then discard. Repeat with new spray every 2 min as needed for opioid overdose symptoms, alternating nostrils. 2/23/22   Yumi Holly MD   pen needle, diabetic 30 gauge x 3/16\" ndle 5 Units by Does Not Apply route Before breakfast, lunch, and dinner. 2/23/22   Yumi Holly MD   insulin glargine (Lantus Solostar U-100 Insulin) 100 unit/mL (3 mL) inpn INJECT 30 UNITS SUBCUTANEOUSLY DAILY 2/14/22   Bijan Monte MD   amLODIPine (NORVASC) 5 mg tablet Take 1 Tablet by mouth daily. 2/15/22   Bijan Monte MD   carvediloL (COREG) 12.5 mg tablet Take 1 Tablet by mouth two (2) times daily (with meals). 2/14/22   Bijan Monte MD   finasteride (PROSCAR) 5 mg tablet Take 1 Tablet by mouth daily. 2/15/22   Viral Bocanegra MD   insulin aspart U-100 (NovoLOG Flexpen U-100 Insulin) 100 unit/mL (3 mL) inpn 10 Units by SubCUTAneous route Before breakfast, lunch, and dinner. (Novolog or Humalog, whichever more preferred: Inject 5 units with each meal + correction insulin, up to 30 units per day 2/14/22   Bijan Monte MD   rosuvastatin (CRESTOR) 40 mg tablet Take 1 Tablet by mouth nightly.  1/27/22   Yumi Holly MD ondansetron (ZOFRAN ODT) 4 mg disintegrating tablet Take 1 Tablet by mouth every eight (8) hours as needed for Nausea or Vomiting. 12/28/21   Reshma Gleason MD   DULoxetine (CYMBALTA) 60 mg capsule Take 1 Capsule by mouth daily. 12/13/21   Jaxon Vivar MD   pantoprazole (Protonix) 40 mg tablet Take 1 Tablet by mouth daily. 12/6/21   Praveen Taylor MD   ergocalciferol (ERGOCALCIFEROL) 1,250 mcg (50,000 unit) capsule Take 1 capsule by mouth once a week 10/30/21   Jaxon Vivar MD   pregabalin (Lyrica) 75 mg capsule Take 75 mg by mouth two (2) times a day. Provider, Historical       REVIEW OF SYSTEMS:     I am not able to complete the review of systems because:    The patient is intubated and sedated    The patient has altered mental status due to his acute medical problems    The patient has baseline aphasia from prior stroke(s)    The patient has baseline dementia and is not reliable historian    The patient is in acute medical distress and unable to provide information           Total of 12 systems reviewed as follows:       POSITIVE= underlined text  Negative = text not underlined  General:  fever, chills, sweats, generalized weakness, weight loss/gain,      loss of appetite   Eyes:    blurred vision, eye pain, loss of vision, double vision  ENT:    rhinorrhea, pharyngitis   Respiratory:   cough, sputum production, SOB, [place], wheezing, pleuritic pain   Cardiology:   chest pain, palpitations, orthopnea, PND, edema, syncope   Gastrointestinal:  abdominal pain , N/V, diarrhea, dysphagia, constipation, bleeding   Genitourinary:  frequency, urgency, dysuria, hematuria, incontinence   Muskuloskeletal :  arthralgia, myalgia, back pain  Hematology:  easy bruising, nose or gum bleeding, lymphadenopathy   Dermatological: rash, ulceration, pruritis, color change / jaundice  Endocrine:   hot flashes or polydipsia   Neurological:  headache, dizziness, confusion, focal weakness, paresthesia,     Speech difficulties, memory loss, gait difficulty  Psychological: Feelings of anxiety, depression, agitation    Objective:   VITALS:    Visit Vitals  BP (!) 156/88   Pulse (!) 104   Temp 97.5 °F (36.4 °C)   Resp 20   Ht 5' 9\" (1.753 m)   Wt 70.8 kg (156 lb)   SpO2 100%   BMI 23.04 kg/m²       PHYSICAL EXAM:    General:    Alert, cooperative, no distress, appears stated age. HEENT: Atraumatic, anicteric sclerae, pink conjunctivae     No oral ulcers, mucosa moist, throat clear, dentition fair  Neck:  Supple, symmetrical,  thyroid: non tender  Lungs:   Clear to auscultation bilaterally. No Wheezing or Rhonchi. No rales. Chest wall:  No tenderness  No Accessory muscle use. Heart:   Regular  rhythm,  No  murmur   No edema  Abdomen:   Soft, non-tender. Not distended. Bowel sounds normal  Extremities: No cyanosis. No clubbing,      Skin turgor normal, Capillary refill normal, Radial dial pulse 2+  Skin:     Not pale. Not Jaundiced  No rashes   Psych:  Good insight. Not depressed. Not anxious or agitated. Neurologic: EOMs intact. No facial asymmetry. No aphasia or slurred speech. Symmetrical strength, Sensation grossly intact.  Alert and oriented X 4.     _______________________________________________________________________  Care Plan discussed with:    Comments   Patient x    Family      RN x    Care Manager                    Consultant:  x    _______________________________________________________________________  Expected  Disposition:   Home with Family x   HH/PT/OT/RN    SNF/LTC    CATHIE    ________________________________________________________________________  TOTAL TIME:  61 Minutes    Critical Care Provided     Minutes non procedure based      Comments     Reviewed previous records   >50% of visit spent in counseling and coordination of care  Discussion with patient and/or family and questions answered       ________________________________________________________________________  Signed: Ricardo Aguilar MD    Procedures: see electronic medical records for all procedures/Xrays and details which were not copied into this note but were reviewed prior to creation of Plan.     LAB DATA REVIEWED:    Recent Results (from the past 24 hour(s))   GLUCOSE, POC    Collection Time: 03/08/22 12:27 PM   Result Value Ref Range    Glucose (POC) 487 (H) 65 - 117 mg/dL    Performed by Karyn Pacheco \"Fabio\"    EKG, 12 LEAD, INITIAL    Collection Time: 03/08/22  1:17 PM   Result Value Ref Range    Ventricular Rate 93 BPM    Atrial Rate 93 BPM    P-R Interval 142 ms    QRS Duration 94 ms    Q-T Interval 396 ms    QTC Calculation (Bezet) 492 ms    Calculated P Axis 80 degrees    Calculated R Axis 109 degrees    Calculated T Axis 59 degrees    Diagnosis       Normal sinus rhythm  Rightward axis  Pulmonary disease pattern  Prolonged QT  When compared with ECG of 03-MAR-2022 14:27,  No significant change was found  Confirmed by Frankey Shiley ([id number]) on 3/8/2022 4:55:32 PM     URINALYSIS W/ REFLEX CULTURE    Collection Time: 03/08/22  1:47 PM    Specimen: Urine   Result Value Ref Range    Color YELLOW/STRAW      Appearance CLOUDY (A) CLEAR      Specific gravity 1.024 1.003 - 1.030      pH (UA) 6.0 5.0 - 8.0      Protein 300 (A) NEG mg/dL    Glucose >1,000 (A) NEG mg/dL    Ketone 80 (A) NEG mg/dL    Bilirubin Negative NEG      Blood SMALL (A) NEG      Urobilinogen 0.2 0.2 - 1.0 EU/dL    Nitrites Negative NEG      Leukocyte Esterase SMALL (A) NEG      WBC >100 (H) 0 - 4 /hpf    RBC 5-10 0 - 5 /hpf    Epithelial cells FEW FEW /lpf    Bacteria 4+ (A) NEG /hpf    UA:UC IF INDICATED URINE CULTURE ORDERED (A) CNI      Hyaline cast 0-2 0 - 5 /lpf   CBC WITH AUTOMATED DIFF    Collection Time: 03/08/22  2:23 PM   Result Value Ref Range    WBC 10.9 4.1 - 11.1 K/uL    RBC 4.02 (L) 4.10 - 5.70 M/uL    HGB 11.9 (L) 12.1 - 17.0 g/dL    HCT 37.1 36.6 - 50.3 %    MCV 92.3 80.0 - 99.0 FL    MCH 29.6 26.0 - 34.0 PG    MCHC 32.1 30.0 - 36.5 g/dL    RDW 14.0 11.5 - 14.5 %    PLATELET 003 (H) 014 - 400 K/uL    MPV 10.6 8.9 - 12.9 FL    NRBC 0.0 0  WBC    ABSOLUTE NRBC 0.00 0.00 - 0.01 K/uL    NEUTROPHILS 81 (H) 32 - 75 %    LYMPHOCYTES 12 12 - 49 %    MONOCYTES 4 (L) 5 - 13 %    EOSINOPHILS 1 0 - 7 %    BASOPHILS 1 0 - 1 %    IMMATURE GRANULOCYTES 2 (H) 0.0 - 0.5 %    ABS. NEUTROPHILS 8.7 (H) 1.8 - 8.0 K/UL    ABS. LYMPHOCYTES 1.4 0.8 - 3.5 K/UL    ABS. MONOCYTES 0.5 0.0 - 1.0 K/UL    ABS. EOSINOPHILS 0.1 0.0 - 0.4 K/UL    ABS. BASOPHILS 0.1 0.0 - 0.1 K/UL    ABS. IMM. GRANS. 0.2 (H) 0.00 - 0.04 K/UL    DF AUTOMATED     METABOLIC PANEL, COMPREHENSIVE    Collection Time: 03/08/22  2:23 PM   Result Value Ref Range    Sodium 126 (L) 136 - 145 mmol/L    Potassium 4.2 3.5 - 5.1 mmol/L    Chloride 90 (L) 97 - 108 mmol/L    CO2 11 (LL) 21 - 32 mmol/L    Anion gap 25 (H) 5 - 15 mmol/L    Glucose 601 (HH) 65 - 100 mg/dL    BUN 28 (H) 6 - 20 MG/DL    Creatinine 1.94 (H) 0.70 - 1.30 MG/DL    BUN/Creatinine ratio 14 12 - 20      GFR est AA 47 (L) >60 ml/min/1.73m2    GFR est non-AA 39 (L) >60 ml/min/1.73m2    Calcium 8.4 (L) 8.5 - 10.1 MG/DL    Bilirubin, total 0.5 0.2 - 1.0 MG/DL    ALT (SGPT) 16 12 - 78 U/L    AST (SGOT) 10 (L) 15 - 37 U/L    Alk.  phosphatase 212 (H) 45 - 117 U/L    Protein, total 6.2 (L) 6.4 - 8.2 g/dL    Albumin 1.7 (L) 3.5 - 5.0 g/dL    Globulin 4.5 (H) 2.0 - 4.0 g/dL    A-G Ratio 0.4 (L) 1.1 - 2.2     LIPASE    Collection Time: 03/08/22  2:23 PM   Result Value Ref Range    Lipase 58 (L) 73 - 393 U/L   PROCALCITONIN    Collection Time: 03/08/22  2:23 PM   Result Value Ref Range    Procalcitonin 0.34 ng/mL   BLOOD GAS,CHEM8,LACTIC ACID POC    Collection Time: 03/08/22  2:23 PM   Result Value Ref Range    Calcium, ionized (POC) 1.12 1.12 - 1.32 mmol/L    BICARBONATE 10 mmol/L    Base deficit (POC) 14.1 mmol/L    Sample source VENOUS BLOOD      CO2, POC 12 (L) 19 - 24 MMOL/L    Sodium,  (L) 136 - 145 MMOL/L    Potassium, POC 4.4 3.5 - 5.5 MMOL/L Chloride, POC 98 (L) 100 - 108 MMOL/L    Glucose,  (HH) 74 - 106 MG/DL    Creatinine, POC 1.9 (H) 0.6 - 1.3 MG/DL    Lactic Acid (POC) 2.90 (HH) 0.40 - 2.00 mmol/L    Critical value read back ROCÍO     pH, venous (POC) 7.25 (L) 7.32 - 7.42      pCO2, venous (POC) 23.8 (L) 41 - 51 MMHG    pO2, venous (POC) 24 (L) 25 - 40 mmHg

## 2022-03-08 NOTE — ED PROVIDER NOTES
EMERGENCY DEPARTMENT HISTORY AND PHYSICAL EXAM      Date: 3/8/2022  Patient Name: Yajaira Taylor    History of Presenting Illness     Chief Complaint   Patient presents with    High Blood Sugar     pt is type 1 diabetic, abdominal pain x 1 week  per EMS       History Provided By: Patient    HPI: Yajaira Taylor, 46 y.o. male with a past medical history significant for Bipolar disorder, type 1 diabetes, history of medication noncompliance, medical problems as stated below presents to the ED with cc of severe nausea, vomiting, abdominal pain, weakness over the last 1 week. Patient reports blood sugars have been running in the 3-400 range. He reports taking both his insulin and Lantus as prescribed. He was seen about a week ago and had hyperglycemia without evidence of DKA and was discharged to home. He has known gastroparesis. He reports his vomiting weakness is worse. He is having no fevers or chills. Denies any urinary complaints or any diarrhea. No cough or trouble breathing or chest pain. No other associated symptoms. No other exacerbating ameliorating factors. There are no other complaints, changes, or physical findings at this time. PCP: Savanna Weston MD    No current facility-administered medications on file prior to encounter. Current Outpatient Medications on File Prior to Encounter   Medication Sig Dispense Refill    metoclopramide HCl (Reglan) 10 mg tablet Take 1 Tablet by mouth every six (6) hours as needed for Nausea for up to 10 days. 12 Tablet 0    ondansetron (ZOFRAN ODT) 8 mg disintegrating tablet Take 1 Tablet by mouth every eight (8) hours as needed for Nausea or Vomiting. 12 Tablet 0    oxyCODONE IR (Roxicodone) 5 mg immediate release tablet Take 1 Tablet by mouth every eight (8) hours as needed for Pain for up to 30 days. Max Daily Amount: 15 mg. 20 Tablet 0    naloxone (Narcan) 4 mg/actuation nasal spray Use 1 spray intranasally, then discard.  Repeat with new spray every 2 min as needed for opioid overdose symptoms, alternating nostrils. 1 Each 0    pen needle, diabetic 30 gauge x 3/16\" ndle 5 Units by Does Not Apply route Before breakfast, lunch, and dinner. 100 Each 3    insulin glargine (Lantus Solostar U-100 Insulin) 100 unit/mL (3 mL) inpn INJECT 30 UNITS SUBCUTANEOUSLY DAILY 35 mL 4    amLODIPine (NORVASC) 5 mg tablet Take 1 Tablet by mouth daily. 30 Tablet 1    carvediloL (COREG) 12.5 mg tablet Take 1 Tablet by mouth two (2) times daily (with meals). 60 Tablet 1    finasteride (PROSCAR) 5 mg tablet Take 1 Tablet by mouth daily. 30 Tablet 2    insulin aspart U-100 (NovoLOG Flexpen U-100 Insulin) 100 unit/mL (3 mL) inpn 10 Units by SubCUTAneous route Before breakfast, lunch, and dinner. (Novolog or Humalog, whichever more preferred: Inject 5 units with each meal + correction insulin, up to 30 units per day 10 Adjustable Dose Pre-filled Pen Syringe 3    rosuvastatin (CRESTOR) 40 mg tablet Take 1 Tablet by mouth nightly. 30 Tablet 0    ondansetron (ZOFRAN ODT) 4 mg disintegrating tablet Take 1 Tablet by mouth every eight (8) hours as needed for Nausea or Vomiting. 20 Tablet 0    DULoxetine (CYMBALTA) 60 mg capsule Take 1 Capsule by mouth daily. 30 Capsule 3    pantoprazole (Protonix) 40 mg tablet Take 1 Tablet by mouth daily. 30 Tablet 0    ergocalciferol (ERGOCALCIFEROL) 1,250 mcg (50,000 unit) capsule Take 1 capsule by mouth once a week 12 Capsule 0    pregabalin (Lyrica) 75 mg capsule Take 75 mg by mouth two (2) times a day.          Past History     Past Medical History:  Past Medical History:   Diagnosis Date    Bipolar 1 disorder, depressed ([facility]     Bipolar disorder ([facility]     Depression     Diabetes ([facility]     DKA, type 1 ([facility] [facility] 1/27/2013    diagnosed age 23    H/O noncompliance with medical treatment, presenting hazards to health     MRSA (methicillin resistant staph aureus) culture positive     MRSA (methicillin resistant Staphylococcus aureus)     Face    Noncompliance with medication regimen     Second hand smoke exposure     Seizure ([facility] [facility]     Seizures ([facility] [address] 2006 or 2007    one episode during skilled nursing       Past Surgical History:  Past Surgical History:   Procedure Laterality Date    HX HEENT      top left wisdom tooth    HX ORTHOPAEDIC Left     wrist; [facility]    UPPER GI ENDOSCOPY,BIOPSY  2018            Family History:  Family History   Problem Relation Age of Onset    Diabetes Mother     Diabetes Other         neice, type 1        Social History:  Social History     Tobacco Use    Smoking status: Former Smoker     Packs/day: 0.10     Years: 16.00     Pack years: 1.60     Types: Cigarettes     Start date: 10/4/1999     Quit date: 2020     Years since quittin.0    Smokeless tobacco: Never Used   Substance Use Topics    Alcohol use: No    Drug use: No       Allergies:  No Known Allergies      Review of Systems   Review of Systems   Constitutional: Positive for fatigue. Negative for chills, diaphoresis and fever. HENT: Negative for ear pain and sore throat. Eyes: Negative for pain and redness. Respiratory: Negative for cough and shortness of breath. Cardiovascular: Negative for chest pain and leg swelling. Gastrointestinal: Positive for abdominal pain, nausea and vomiting. Negative for diarrhea. Endocrine: Negative for cold intolerance and heat intolerance. Genitourinary: Negative for flank pain and hematuria. Musculoskeletal: Negative for back pain and neck stiffness. Skin: Negative for rash and wound. Neurological: Positive for weakness. Negative for dizziness, syncope and headaches. All other systems reviewed and are negative. Physical Exam   Physical Exam  Vitals and nursing note reviewed. Constitutional:       General: He is in acute distress. Appearance: He is well-developed. He is ill-appearing. HENT:      Head: Normocephalic and atraumatic.       Mouth/Throat:      Mouth: Mucous membranes are dry. Pharynx: No oropharyngeal exudate. Eyes:      Conjunctiva/sclera: Conjunctivae normal.      Pupils: Pupils are equal, round, and reactive to light. Cardiovascular:      Rate and Rhythm: Regular rhythm. Tachycardia present. Heart sounds: No murmur heard. Pulmonary:      Effort: Pulmonary effort is normal. No respiratory distress. Breath sounds: Normal breath sounds. No wheezing. Abdominal:      General: Bowel sounds are normal. There is no distension. Palpations: Abdomen is soft. Tenderness: There is generalized abdominal tenderness. There is no right CVA tenderness, left CVA tenderness, guarding or rebound. Musculoskeletal:         General: No deformity. Normal range of motion. Cervical back: Normal range of motion. Skin:     General: Skin is warm and dry. Findings: No rash. Neurological:      Mental Status: He is alert and oriented to person, place, and time.       Coordination: Coordination normal.   Psychiatric:         Behavior: Behavior normal.         Diagnostic Study Results     Labs -     Recent Results (from the past 24 hour(s))   GLUCOSE, POC    Collection Time: 03/08/22 12:27 PM   Result Value Ref Range    Glucose (POC) 487 (H) 65 - 117 mg/dL    Performed by Rubens Link \"Fabio\"    EKG, 12 LEAD, INITIAL    Collection Time: 03/08/22  1:17 PM   Result Value Ref Range    Ventricular Rate 93 BPM    Atrial Rate 93 BPM    P-R Interval 142 ms    QRS Duration 94 ms    Q-T Interval 396 ms    QTC Calculation (Bezet) 492 ms    Calculated P Axis 80 degrees    Calculated R Axis 109 degrees    Calculated T Axis 59 degrees    Diagnosis       Normal sinus rhythm  Rightward axis  Pulmonary disease pattern  Prolonged QT  When compared with ECG of 03-MAR-2022 14:27,  No significant change was found     URINALYSIS W/ REFLEX CULTURE    Collection Time: 03/08/22  1:47 PM    Specimen: Urine   Result Value Ref Range    Color YELLOW/STRAW      Appearance CLOUDY (A) CLEAR      Specific gravity 1.024 1.003 - 1.030      pH (UA) 6.0 5.0 - 8.0      Protein 300 (A) NEG mg/dL    Glucose >1,000 (A) NEG mg/dL    Ketone 80 (A) NEG mg/dL    Bilirubin Negative NEG      Blood SMALL (A) NEG      Urobilinogen 0.2 0.2 - 1.0 EU/dL    Nitrites Negative NEG      Leukocyte Esterase SMALL (A) NEG      WBC >100 (H) 0 - 4 /hpf    RBC 5-10 0 - 5 /hpf    Epithelial cells FEW FEW /lpf    Bacteria 4+ (A) NEG /hpf    UA:UC IF INDICATED URINE CULTURE ORDERED (A) CNI      Hyaline cast 0-2 0 - 5 /lpf   CBC WITH AUTOMATED DIFF    Collection Time: 03/08/22  2:23 PM   Result Value Ref Range    WBC 10.9 4.1 - 11.1 K/uL    RBC 4.02 (L) 4.10 - 5.70 M/uL    HGB 11.9 (L) 12.1 - 17.0 g/dL    HCT 37.1 36.6 - 50.3 %    MCV 92.3 80.0 - 99.0 FL    MCH 29.6 26.0 - 34.0 PG    MCHC 32.1 30.0 - 36.5 g/dL    RDW 14.0 11.5 - 14.5 %    PLATELET 172 (H) 563 - 400 K/uL    MPV 10.6 8.9 - 12.9 FL    NRBC 0.0 0  WBC    ABSOLUTE NRBC 0.00 0.00 - 0.01 K/uL    NEUTROPHILS 81 (H) 32 - 75 %    LYMPHOCYTES 12 12 - 49 %    MONOCYTES 4 (L) 5 - 13 %    EOSINOPHILS 1 0 - 7 %    BASOPHILS 1 0 - 1 %    IMMATURE GRANULOCYTES 2 (H) 0.0 - 0.5 %    ABS. NEUTROPHILS 8.7 (H) 1.8 - 8.0 K/UL    ABS. LYMPHOCYTES 1.4 0.8 - 3.5 K/UL    ABS. MONOCYTES 0.5 0.0 - 1.0 K/UL    ABS. EOSINOPHILS 0.1 0.0 - 0.4 K/UL    ABS. BASOPHILS 0.1 0.0 - 0.1 K/UL    ABS. IMM.  GRANS. 0.2 (H) 0.00 - 0.04 K/UL    DF AUTOMATED     METABOLIC PANEL, COMPREHENSIVE    Collection Time: 03/08/22  2:23 PM   Result Value Ref Range    Sodium 126 (L) 136 - 145 mmol/L    Potassium 4.2 3.5 - 5.1 mmol/L    Chloride 90 (L) 97 - 108 mmol/L    CO2 11 (LL) 21 - 32 mmol/L    Anion gap 25 (H) 5 - 15 mmol/L    Glucose 601 (HH) 65 - 100 mg/dL    BUN 28 (H) 6 - 20 MG/DL    Creatinine 1.94 (H) 0.70 - 1.30 MG/DL    BUN/Creatinine ratio 14 12 - 20      GFR est AA 47 (L) >60 ml/min/1.73m2    GFR est non-AA 39 (L) >60 ml/min/1.73m2    Calcium 8.4 (L) 8.5 - 10.1 MG/DL    Bilirubin, total 0.5 0.2 - 1.0 MG/DL    ALT (SGPT) 16 12 - 78 U/L    AST (SGOT) 10 (L) 15 - 37 U/L    Alk. phosphatase 212 (H) 45 - 117 U/L    Protein, total 6.2 (L) 6.4 - 8.2 g/dL    Albumin 1.7 (L) 3.5 - 5.0 g/dL    Globulin 4.5 (H) 2.0 - 4.0 g/dL    A-G Ratio 0.4 (L) 1.1 - 2.2     LIPASE    Collection Time: 03/08/22  2:23 PM   Result Value Ref Range    Lipase 58 (L) 73 - 393 U/L   PROCALCITONIN    Collection Time: 03/08/22  2:23 PM   Result Value Ref Range    Procalcitonin 0.34 ng/mL   BLOOD GAS,CHEM8,LACTIC ACID POC    Collection Time: 03/08/22  2:23 PM   Result Value Ref Range    Calcium, ionized (POC) 1.12 1.12 - 1.32 mmol/L    BICARBONATE 10 mmol/L    Base deficit (POC) 14.1 mmol/L    Sample source VENOUS BLOOD      CO2, POC 12 (L) 19 - 24 MMOL/L    Sodium,  (L) 136 - 145 MMOL/L    Potassium, POC 4.4 3.5 - 5.5 MMOL/L    Chloride, POC 98 (L) 100 - 108 MMOL/L    Glucose,  (HH) 74 - 106 MG/DL    Creatinine, POC 1.9 (H) 0.6 - 1.3 MG/DL    Lactic Acid (POC) 2.90 (HH) 0.40 - 2.00 mmol/L    Critical value read back [place]     pH, venous (POC) 7.25 (L) 7.32 - 7.42      pCO2, venous (POC) 23.8 (L) 41 - 51 MMHG    pO2, venous (POC) 24 (L) 25 - 40 mmHg       Radiologic Studies -   XR CHEST PORT   Final Result      No acute findings. CT Results  (Last 48 hours)    None        CXR Results  (Last 48 hours)               03/08/22 1331  XR CHEST PORT Final result    Impression:      No acute findings. Narrative:  EXAM:  XR CHEST PORT       INDICATION: Vomiting, abdominal pain       COMPARISON: Chest radiographs 5/3/2022       TECHNIQUE: Upright portable chest AP view       FINDINGS:        Cardiac mediastinal silhouette within normal limits. Lungs and pleural spaces   grossly clear. Bilateral metallic nipple jewelry. Medical Decision Making   I am the first provider for this patient.     I reviewed the vital signs, available nursing notes, past medical history, past surgical history, family history and social history. Vital Signs-Reviewed the patient's vital signs. Patient Vitals for the past 12 hrs:   Temp Pulse Resp BP SpO2   03/08/22 1432 97.5 °F (36.4 °C) -- -- -- 98 %   03/08/22 1432 -- (!) 102 20 139/78 --   03/08/22 1402 -- (!) 109 24 (!) 159/89 --   03/08/22 1332 -- 100 20 (!) 147/73 100 %   03/08/22 1302 -- 95 18 (!) 154/90 100 %   03/08/22 1218 97.7 °F (36.5 °C) 93 20 137/72 100 %       Records Reviewed: Nursing records and medical records reviewed    MDM:  Pt presents with acute abdominal pain; vital signs stable with currently a non-peritoneal exam; DDx includes: Gastroenteritis, hepatitis, pancreatitis, obstruction, appendicitis, viral illness, IBD, diverticulitis, mesenteric ischemia, AAA or descending dissection, ACS, kidney stone. Will get labs, treat symptomatically and obtain serial abdominal exams to determine if a CT is warranted. Will reassess and monitor closely. Patient presenting with generalized fatigue/weakness. Stable vitals and nontoxic appearing. DDx: infection, anemia, electrolyte anomoly (hypo or hyperkalemia, hypomagnesemia), hypothyroid, dehydration, depression, CA, ACS. Will obtain EKG, UA, labwork for any urgent/emergent pathology. Will reassess and monitor while in ED. Provider Notes (Medical Decision Making):   74-year-old male presenting with acute diabetic ketoacidosis. Patient's venous blood gas is 7.25, anion gap of 25, blood sugar of over 600, potassium of 4.2. Patient started on IV insulin, fluids, potassium replacement. Patient does have urinary tract infection, however, I do believe patient's acute endorgan damage is due to DKA and dehydration and not sepsis. ED Course:   Initial assessment performed. The patients presenting problems have been discussed, and they are in agreement with the care plan formulated and outlined with them. I have encouraged them to ask questions as they arise throughout their visit.     ED Course as of 03/08/22 [address] [place],[address] Mar 08, 2022   1600 Patient with elevated lactic acid, however, as well as FELECIA. I feel symptoms are likely due to dehydration and DKA and not from sepsis. [CC]      ED Course User Index  [CC] Fan Paredes MD       Medications Administered     droperidoL (INAPSINE) injection 1.25 mg     Admin Date  03/08/2022 Action  Given Dose  1.25 mg Route  IntraVENous Administered By  Nasir Davis RN          fentaNYL citrate (PF) injection 50 mcg     Admin Date  03/08/2022 Action  Given Dose  50 mcg Route  IntraVENous Administered By  Nasir Davis RN          sodium chloride 0.9 % bolus infusion 1,000 mL     Admin Date  03/08/2022 Action  New Bag Dose  1,000 mL Rate  1,000 mL/hr Route  IntraVENous Administered By  Nasir Davis RN                    Critical Care:  I have spent 35 minutes of critical care time in evaluating and treating this patient. This includes time spent at bedside, time with family and decision makers, documentation, review of labs and imaging, and/or consultation with specialists. It does not include time spent on separately billed procedures. This patient presents with a critical illness or injury that acutely impairs one or more vital organ systems such that there is a high probability of imminent or life threatening deterioration in the patient's condition. This case involved decision making of high complexity to assess, manipulate, and support vital organ system failure and/or to prevent further life threatening deterioration of the patient's condition. Failure to initiate these interventions on an urgent basis would likely result in sudden, clinically significant or life threatening deterioration in the patient's condition.     Abnormal findings supporting critical care: Severe diabetic acidosis, anion gap,  Interventions to support critical care: IV insulin, IV fluids  Failure to intervene may result in: Further metabolic derangements and/or death        Disposition:  Admit Note:  4:33 PM  Pt is being admitted by Dr. Evangelist Mendoza. The results of their tests and reason(s) for their admission have been discussed with pt and/or available family. They convey agreement and understanding for the need to be admitted and for admission diagnosis. Diagnosis     Clinical Impression:   1. Diabetic ketoacidosis without coma associated with type 1 diabetes mellitus ([facility] [address]    2. Acute kidney injury [facility])        Attestations:    Edward Amezcua MD    Please note that this dictation was completed with [employer], the computer voice recognition software. Quite often unanticipated grammatical, syntax, homophones, and other interpretive errors are inadvertently transcribed by the computer software. Please disregard these errors. Please excuse any errors that have escaped final proofreading. Thank you.

## 2022-03-08 NOTE — ED NOTES
Attempted to pull labs from IV to right hand- unsuccessful for blood draw. Flushes well. Notified tech of need for US IV.

## 2022-03-08 NOTE — ED NOTES
Assumed care of pt. Pt to ER with reports of abd pain/ N/V/D x 8 days. Reports he was seen here recently for same complaints and d/herminia home with zofran but has not got better. Pt alert, oriented x3. HR RRR. Resp even and nonlabored. Lungs CTA. Abd soft/tender all over. Pt actively vomiting. Glucose 487. Hx of dm. Awaiting orders.

## 2022-03-09 ENCOUNTER — APPOINTMENT (OUTPATIENT)
Dept: ULTRASOUND IMAGING | Age: 42
DRG: 420 | End: 2022-03-09
Attending: STUDENT IN AN ORGANIZED HEALTH CARE EDUCATION/TRAINING PROGRAM
Payer: COMMERCIAL

## 2022-03-09 LAB
ADMINISTERED INITIALS, ADMINIT: NORMAL
ANION GAP SERPL CALC-SCNC: 11 MMOL/L (ref 5–15)
ANION GAP SERPL CALC-SCNC: 12 MMOL/L (ref 5–15)
BUN SERPL-MCNC: 31 MG/DL (ref 6–20)
BUN SERPL-MCNC: 34 MG/DL (ref 6–20)
BUN/CREAT SERPL: 15 (ref 12–20)
BUN/CREAT SERPL: 16 (ref 12–20)
CALCIUM SERPL-MCNC: 8.4 MG/DL (ref 8.5–10.1)
CALCIUM SERPL-MCNC: 8.7 MG/DL (ref 8.5–10.1)
CHLORIDE SERPL-SCNC: 103 MMOL/L (ref 97–108)
CHLORIDE SERPL-SCNC: 104 MMOL/L (ref 97–108)
CO2 SERPL-SCNC: 21 MMOL/L (ref 21–32)
CO2 SERPL-SCNC: 21 MMOL/L (ref 21–32)
CREAT SERPL-MCNC: 2.07 MG/DL (ref 0.7–1.3)
CREAT SERPL-MCNC: 2.09 MG/DL (ref 0.7–1.3)
D50 ADMINISTERED, D50ADM: 0 ML
D50 ORDER, D50ORD: 0 ML
EST. AVERAGE GLUCOSE BLD GHB EST-MCNC: 280 MG/DL
GLSCOM COMMENTS: NORMAL
GLUCOSE BLD STRIP.AUTO-MCNC: 109 MG/DL (ref 65–117)
GLUCOSE BLD STRIP.AUTO-MCNC: 112 MG/DL (ref 65–117)
GLUCOSE BLD STRIP.AUTO-MCNC: 113 MG/DL (ref 65–117)
GLUCOSE BLD STRIP.AUTO-MCNC: 123 MG/DL (ref 65–117)
GLUCOSE BLD STRIP.AUTO-MCNC: 127 MG/DL (ref 65–117)
GLUCOSE BLD STRIP.AUTO-MCNC: 149 MG/DL (ref 65–117)
GLUCOSE BLD STRIP.AUTO-MCNC: 164 MG/DL (ref 65–117)
GLUCOSE BLD STRIP.AUTO-MCNC: 213 MG/DL (ref 65–117)
GLUCOSE BLD STRIP.AUTO-MCNC: 256 MG/DL (ref 65–117)
GLUCOSE BLD STRIP.AUTO-MCNC: 93 MG/DL (ref 65–117)
GLUCOSE SERPL-MCNC: 103 MG/DL (ref 65–100)
GLUCOSE SERPL-MCNC: 119 MG/DL (ref 65–100)
GLUCOSE, GLC: 109 MG/DL
GLUCOSE, GLC: 112 MG/DL
GLUCOSE, GLC: 123 MG/DL
GLUCOSE, GLC: 127 MG/DL
GLUCOSE, GLC: 164 MG/DL
GLUCOSE, GLC: 213 MG/DL
GLUCOSE, GLC: 93 MG/DL
HBA1C MFR BLD: 11.4 % (ref 4–5.6)
HIGH TARGET, HITG: 250 MG/DL
INSULIN ADMINSTERED, INSADM: 0 UNITS/HOUR
INSULIN ADMINSTERED, INSADM: 0 UNITS/HOUR
INSULIN ADMINSTERED, INSADM: 0.5 UNITS/HOUR
INSULIN ADMINSTERED, INSADM: 0.5 UNITS/HOUR
INSULIN ADMINSTERED, INSADM: 0.7 UNITS/HOUR
INSULIN ADMINSTERED, INSADM: 1 UNITS/HOUR
INSULIN ADMINSTERED, INSADM: 1.6 UNITS/HOUR
INSULIN ORDER, INSORD: 0 UNITS/HOUR
INSULIN ORDER, INSORD: 0 UNITS/HOUR
INSULIN ORDER, INSORD: 0.5 UNITS/HOUR
INSULIN ORDER, INSORD: 0.5 UNITS/HOUR
INSULIN ORDER, INSORD: 0.7 UNITS/HOUR
INSULIN ORDER, INSORD: 1 UNITS/HOUR
INSULIN ORDER, INSORD: 1.6 UNITS/HOUR
LOW TARGET, LOT: 150 MG/DL
MAGNESIUM SERPL-MCNC: 2.3 MG/DL (ref 1.6–2.4)
MAGNESIUM SERPL-MCNC: 2.4 MG/DL (ref 1.6–2.4)
MINUTES UNTIL NEXT BG, NBG: 60 MIN
MULTIPLIER, MUL: 0
MULTIPLIER, MUL: 0.01
MULTIPLIER, MUL: 0.02
MULTIPLIER, MUL: 0.03
ORDER INITIALS, ORDINIT: NORMAL
POTASSIUM SERPL-SCNC: 3.4 MMOL/L (ref 3.5–5.1)
POTASSIUM SERPL-SCNC: 3.5 MMOL/L (ref 3.5–5.1)
SERVICE CMNT-IMP: ABNORMAL
SERVICE CMNT-IMP: NORMAL
SODIUM SERPL-SCNC: 136 MMOL/L (ref 136–145)
SODIUM SERPL-SCNC: 136 MMOL/L (ref 136–145)

## 2022-03-09 PROCEDURE — 74011000250 HC RX REV CODE- 250: Performed by: STUDENT IN AN ORGANIZED HEALTH CARE EDUCATION/TRAINING PROGRAM

## 2022-03-09 PROCEDURE — 74011250637 HC RX REV CODE- 250/637: Performed by: STUDENT IN AN ORGANIZED HEALTH CARE EDUCATION/TRAINING PROGRAM

## 2022-03-09 PROCEDURE — 76770 US EXAM ABDO BACK WALL COMP: CPT

## 2022-03-09 PROCEDURE — 51798 US URINE CAPACITY MEASURE: CPT

## 2022-03-09 PROCEDURE — 36415 COLL VENOUS BLD VENIPUNCTURE: CPT

## 2022-03-09 PROCEDURE — 74011000258 HC RX REV CODE- 258: Performed by: STUDENT IN AN ORGANIZED HEALTH CARE EDUCATION/TRAINING PROGRAM

## 2022-03-09 PROCEDURE — 74011636637 HC RX REV CODE- 636/637: Performed by: STUDENT IN AN ORGANIZED HEALTH CARE EDUCATION/TRAINING PROGRAM

## 2022-03-09 PROCEDURE — 99233 SBSQ HOSP IP/OBS HIGH 50: CPT

## 2022-03-09 PROCEDURE — 82962 GLUCOSE BLOOD TEST: CPT

## 2022-03-09 PROCEDURE — 80048 BASIC METABOLIC PNL TOTAL CA: CPT

## 2022-03-09 PROCEDURE — 74011250636 HC RX REV CODE- 250/636: Performed by: EMERGENCY MEDICINE

## 2022-03-09 PROCEDURE — 65660000000 HC RM CCU STEPDOWN

## 2022-03-09 PROCEDURE — 74011250636 HC RX REV CODE- 250/636: Performed by: NURSE PRACTITIONER

## 2022-03-09 PROCEDURE — 74011250636 HC RX REV CODE- 250/636: Performed by: STUDENT IN AN ORGANIZED HEALTH CARE EDUCATION/TRAINING PROGRAM

## 2022-03-09 PROCEDURE — 83735 ASSAY OF MAGNESIUM: CPT

## 2022-03-09 RX ORDER — METOCLOPRAMIDE 10 MG/1
10 TABLET ORAL
Status: DISCONTINUED | OUTPATIENT
Start: 2022-03-09 | End: 2022-03-12 | Stop reason: HOSPADM

## 2022-03-09 RX ORDER — PREGABALIN 25 MG/1
75 CAPSULE ORAL 2 TIMES DAILY
Status: DISCONTINUED | OUTPATIENT
Start: 2022-03-09 | End: 2022-03-10

## 2022-03-09 RX ORDER — LIDOCAINE HYDROCHLORIDE 20 MG/ML
JELLY TOPICAL
Status: DISPENSED
Start: 2022-03-09 | End: 2022-03-09

## 2022-03-09 RX ORDER — PANTOPRAZOLE SODIUM 40 MG/1
40 TABLET, DELAYED RELEASE ORAL
Status: DISCONTINUED | OUTPATIENT
Start: 2022-03-10 | End: 2022-03-12 | Stop reason: HOSPADM

## 2022-03-09 RX ORDER — SODIUM CHLORIDE 9 MG/ML
75 INJECTION, SOLUTION INTRAVENOUS CONTINUOUS
Status: DISCONTINUED | OUTPATIENT
Start: 2022-03-09 | End: 2022-03-11

## 2022-03-09 RX ORDER — MAGNESIUM SULFATE 100 %
4 CRYSTALS MISCELLANEOUS AS NEEDED
Status: DISCONTINUED | OUTPATIENT
Start: 2022-03-09 | End: 2022-03-12 | Stop reason: HOSPADM

## 2022-03-09 RX ORDER — AMLODIPINE BESYLATE 5 MG/1
5 TABLET ORAL DAILY
Status: DISCONTINUED | OUTPATIENT
Start: 2022-03-10 | End: 2022-03-10

## 2022-03-09 RX ORDER — DEXTROSE MONOHYDRATE 100 MG/ML
0-250 INJECTION, SOLUTION INTRAVENOUS AS NEEDED
Status: DISCONTINUED | OUTPATIENT
Start: 2022-03-09 | End: 2022-03-12 | Stop reason: HOSPADM

## 2022-03-09 RX ORDER — CARVEDILOL 12.5 MG/1
12.5 TABLET ORAL 2 TIMES DAILY WITH MEALS
Status: DISCONTINUED | OUTPATIENT
Start: 2022-03-09 | End: 2022-03-12 | Stop reason: HOSPADM

## 2022-03-09 RX ORDER — LIDOCAINE HYDROCHLORIDE 20 MG/ML
JELLY TOPICAL ONCE
Status: ACTIVE | OUTPATIENT
Start: 2022-03-09 | End: 2022-03-09

## 2022-03-09 RX ORDER — INSULIN GLARGINE 100 [IU]/ML
15 INJECTION, SOLUTION SUBCUTANEOUS DAILY
Status: DISCONTINUED | OUTPATIENT
Start: 2022-03-09 | End: 2022-03-09

## 2022-03-09 RX ORDER — ROSUVASTATIN CALCIUM 20 MG/1
40 TABLET, COATED ORAL
Status: DISCONTINUED | OUTPATIENT
Start: 2022-03-09 | End: 2022-03-12 | Stop reason: HOSPADM

## 2022-03-09 RX ORDER — DEXTROSE, SODIUM CHLORIDE, AND POTASSIUM CHLORIDE 5; .45; .075 G/100ML; G/100ML; G/100ML
125 INJECTION INTRAVENOUS CONTINUOUS
Status: DISCONTINUED | OUTPATIENT
Start: 2022-03-09 | End: 2022-03-09

## 2022-03-09 RX ORDER — FINASTERIDE 5 MG/1
5 TABLET, FILM COATED ORAL DAILY
Status: DISCONTINUED | OUTPATIENT
Start: 2022-03-10 | End: 2022-03-12 | Stop reason: HOSPADM

## 2022-03-09 RX ORDER — DULOXETIN HYDROCHLORIDE 30 MG/1
60 CAPSULE, DELAYED RELEASE ORAL DAILY
Status: DISCONTINUED | OUTPATIENT
Start: 2022-03-10 | End: 2022-03-12 | Stop reason: HOSPADM

## 2022-03-09 RX ORDER — INSULIN LISPRO 100 [IU]/ML
INJECTION, SOLUTION INTRAVENOUS; SUBCUTANEOUS
Status: DISCONTINUED | OUTPATIENT
Start: 2022-03-09 | End: 2022-03-12 | Stop reason: HOSPADM

## 2022-03-09 RX ORDER — INSULIN GLARGINE 100 [IU]/ML
30 INJECTION, SOLUTION SUBCUTANEOUS
Status: DISCONTINUED | OUTPATIENT
Start: 2022-03-09 | End: 2022-03-10

## 2022-03-09 RX ADMIN — Medication 2 UNITS: at 18:17

## 2022-03-09 RX ADMIN — ONDANSETRON HYDROCHLORIDE 4 MG: 2 INJECTION, SOLUTION INTRAMUSCULAR; INTRAVENOUS at 01:50

## 2022-03-09 RX ADMIN — ONDANSETRON HYDROCHLORIDE 4 MG: 2 INJECTION, SOLUTION INTRAMUSCULAR; INTRAVENOUS at 17:27

## 2022-03-09 RX ADMIN — MORPHINE SULFATE 1 MG: 2 INJECTION, SOLUTION INTRAMUSCULAR; INTRAVENOUS at 17:27

## 2022-03-09 RX ADMIN — FENTANYL CITRATE 50 MCG: 50 INJECTION INTRAMUSCULAR; INTRAVENOUS at 04:14

## 2022-03-09 RX ADMIN — SODIUM CHLORIDE, PRESERVATIVE FREE 5 ML: 5 INJECTION INTRAVENOUS at 14:00

## 2022-03-09 RX ADMIN — ONDANSETRON HYDROCHLORIDE 4 MG: 2 INJECTION, SOLUTION INTRAMUSCULAR; INTRAVENOUS at 11:43

## 2022-03-09 RX ADMIN — POTASSIUM CHLORIDE, DEXTROSE MONOHYDRATE AND SODIUM CHLORIDE 125 ML/HR: 75; 5; 450 INJECTION, SOLUTION INTRAVENOUS at 00:40

## 2022-03-09 RX ADMIN — Medication 30 UNITS: at 22:00

## 2022-03-09 RX ADMIN — INSULIN GLARGINE 15 UNITS: 100 INJECTION, SOLUTION SUBCUTANEOUS at 11:53

## 2022-03-09 RX ADMIN — CARVEDILOL 12.5 MG: 12.5 TABLET, FILM COATED ORAL at 17:27

## 2022-03-09 RX ADMIN — SODIUM CHLORIDE, PRESERVATIVE FREE 10 ML: 5 INJECTION INTRAVENOUS at 21:19

## 2022-03-09 RX ADMIN — PREGABALIN 75 MG: 25 CAPSULE ORAL at 17:29

## 2022-03-09 RX ADMIN — Medication 5 UNITS: at 11:30

## 2022-03-09 RX ADMIN — ACETAMINOPHEN 650 MG: 325 TABLET ORAL at 21:18

## 2022-03-09 RX ADMIN — SODIUM CHLORIDE 100 ML/HR: 9 INJECTION, SOLUTION INTRAVENOUS at 12:31

## 2022-03-09 RX ADMIN — MORPHINE SULFATE 1 MG: 2 INJECTION, SOLUTION INTRAMUSCULAR; INTRAVENOUS at 11:42

## 2022-03-09 RX ADMIN — ENOXAPARIN SODIUM 40 MG: 100 INJECTION SUBCUTANEOUS at 11:43

## 2022-03-09 RX ADMIN — CEFTRIAXONE 1 G: 1 INJECTION, POWDER, FOR SOLUTION INTRAMUSCULAR; INTRAVENOUS at 17:27

## 2022-03-09 RX ADMIN — ROSUVASTATIN 40 MG: 20 TABLET, FILM COATED ORAL at 21:18

## 2022-03-09 NOTE — PROGRESS NOTES
Allergies reviewed, education provided on need for in and out urinary catheter for acute urinary retention. Using sterile technique, a 16fr coude catheter was inserted after instillation of lidocaine urojet. Clear yellow urine draining to bag. Venecia Rodriguez RN primary nurse at bedside assisting.

## 2022-03-09 NOTE — PROGRESS NOTES
1900- Bedside shift change report given to Arely Patel RN (oncoming nurse) by [address] Johanny RN (offgoing nurse). Report included the following information SBAR, Kardex, ED Summary, Intake/Output, MAR, Recent Results, Med Rec Status and Cardiac Rhythm NSR/ST.     0630- Pt with 700mL in bladder, straight cath unsuccessful. Lidocaine ordered and coude used, 450 drained. Pt then stood and peed 200mL. 0700- . End of Shift Note    Bedside shift change report given to  (oncoming nurse) by Radha Kennedy RN (offgoing nurse). Report included the following information SBAR, Kardex, ED Summary, Intake/Output, MAR, Recent Results, Med Rec Status and Cardiac Rhythm NSR    Shift worked:  7p-7a     Shift summary and any significant changes:     Pt on insulin gtt. Labs improving, gap closing. Straight cath overnight. Concerns for physician to address:       Zone phone for oncoming shift:          Activity:  Activity Level: Up with Assistance  Number times ambulated in hallways past shift: 0  Number of times OOB to chair past shift: 0    Cardiac:   Cardiac Monitoring: Yes      Cardiac Rhythm: Sinus Rhythm    Access:   Current line(s): PIV     Genitourinary:   Urinary status: voiding    Respiratory:   O2 Device: None (Room air)  Chronic home O2 use?: NO  Incentive spirometer at bedside: YES       GI:  Last Bowel Movement Date: 03/07/22  Current diet:  DIET NPO  Passing flatus: YES  Tolerating current diet: YES       Pain Management:   Patient states pain is manageable on current regimen: YES    Skin:  Corey Score: 17  Interventions: speciality bed, increase time out of bed, PT/OT consult and nutritional support     Patient Safety:  Fall Score:  Total Score: 2  Interventions: bed/chair alarm, assistive device (walker, cane, etc), gripper socks and pt to call before getting OOB       Length of Stay:  Expected LOS: - - -  Actual LOS: [address] [facility] Frank, RN

## 2022-03-09 NOTE — PROGRESS NOTES
Hospitalist Progress Note    NAME: Leisa Bazzi   :  1980   MRN:  [id number]       Assessment / Plan:    Diabetic ketoacidosis  Noncompliance  Metabolic acidosis  CKD stage IIIb  UTI/cystitis  -Chest x-ray-no acute finding. -CTA abdomen/pelvis- 1. Study significantly limited by motion. 2.  Markedly distended bladder with intraluminal gas, correlate for recent  instrumentation and/or cystitis. -DKA resolved, transition to the Lantus along with sliding scale insulin.  -Started on clear liquid diet. -UA suggestive of UTI so started on antibiotics. WBC also elevated. -Blood culture pending.  -Urine culture pending.  -Started on Rocephin. Continue the Rocephin.  -Diabetic management consulted. -IV fluids changed today. -Creatinine 2.09, baseline is around 1.9.  -Ordered ultrasound kidney. Urinary retention  BPH  -Resolved, blood cultures continue. Patient history of BPH we will continue the home medications.     Hypertension  Hyperlipidemia  Anxiety disorder  GERD  -Resume home medications. 18.5 - 24.9 Normal weight / Body mass index is 23.04 kg/m². Estimated discharge date:   Barriers:    Code status: Full  Prophylaxis: Hep SQ  Recommended Disposition: Home w/Family     Subjective:     Chief Complaint / Reason for Physician Visit  Patient seen today, doing fine, feeling hungry and wants to eat, no nausea or vomiting. .  Discussed with RN events overnight. Review of Systems:  Symptom Y/N Comments  Symptom Y/N Comments   Fever/Chills    Chest Pain     Poor Appetite    Edema     Cough    Abdominal Pain     Sputum    Joint Pain     SOB/JOHNSTON    Pruritis/Rash     Nausea/vomit    Tolerating PT/OT     Diarrhea    Tolerating Diet     Constipation    Other       Could NOT obtain due to:      Objective:     VITALS:   Last 24hrs VS reviewed since prior progress note.  Most recent are:  Patient Vitals for the past 24 hrs:   Temp Pulse Resp BP SpO2   22 1133 -- 100 18 (!) 156/87 100 %   03/09/22 0727 97.7 °F (36.5 °C) 94 17 (!) 175/94 100 %   03/09/22 0425 98.2 °F (36.8 °C) 85 10 (!) 141/70 99 %   03/08/22 2333 97.8 °F (36.6 °C) 98 19 (!) 163/84 100 %   03/08/22 2312 -- 91 10 -- 100 %   03/08/22 1901 97.8 °F (36.6 °C) (!) 103 23 (!) 142/85 98 %   03/08/22 1858 -- 100 26 (!) 142/85 100 %   03/08/22 1832 -- 100 -- -- 96 %   03/08/22 1759 98.3 °F (36.8 °C) -- 16 -- --   03/08/22 1744 -- (!) 103 -- 131/74 --   03/08/22 1729 -- (!) 103 -- (!) 161/87 --   03/08/22 1642 -- (!) 104 -- (!) 156/88 100 %   03/08/22 1627 -- (!) 102 -- (!) 155/86 100 %   03/08/22 1612 -- (!) 101 -- (!) 142/79 100 %   03/08/22 1557 -- (!) 101 -- (!) 140/83 100 %   03/08/22 1542 -- (!) 106 -- (!) 152/86 100 %   03/08/22 1432 97.5 °F (36.4 °C) -- -- -- 98 %   03/08/22 1432 -- (!) 102 20 139/78 --   03/08/22 1402 -- (!) 109 24 (!) 159/89 --   03/08/22 1332 -- 100 20 (!) 147/73 100 %   03/08/22 1302 -- 95 18 (!) 154/90 100 %       Intake/Output Summary (Last 24 hours) at 3/9/2022 1254  Last data filed at 3/9/2022 1159  Gross per 24 hour   Intake 1000 ml   Output 1215 ml   Net -215 ml        I had a face to face encounter and independently examined this patient on 3/9/2022, as outlined below:  PHYSICAL EXAM:  General: WD, WN. Alert, cooperative, no acute distress    EENT:  EOMI. Anicteric sclerae. MMM  Resp:  CTA bilaterally, no wheezing or rales. No accessory muscle use  CV:  Regular  rhythm,  No edema  GI:  Soft, Non distended, Non tender. +Bowel sounds  Neurologic:  Alert and oriented X 3, normal speech,   Psych:   Good insight. Not anxious nor agitated  Skin:  No rashes.   No jaundice    Reviewed most current lab test results and cultures  YES  Reviewed most current radiology test results   YES  Review and summation of old records today    NO  Reviewed patient's current orders and MAR    YES  PMH/SH reviewed - no change compared to H&P  ________________________________________________________________________  Care Plan discussed with:    Comments   Patient x    Family      RN x    Care Manager     Consultant                       x Multidiciplinary team rounds were held today with , nursing, pharmacist and clinical coordinator. Patient's plan of care was discussed; medications were reviewed and discharge planning was addressed. ________________________________________________________________________  Total NON critical care TIME:  35  Minutes    Total CRITICAL CARE TIME Spent:   Minutes non procedure based      Comments   >50% of visit spent in counseling and coordination of care     ________________________________________________________________________  Andrew Cheng MD     Procedures: see electronic medical records for all procedures/Xrays and details which were not copied into this note but were reviewed prior to creation of Plan. LABS:  I reviewed today's most current labs and imaging studies. Pertinent labs include:  Recent Labs     03/08/22  1423   WBC 10.9   HGB 11.9*   HCT 37.1   *     Recent Labs     03/09/22  0701 03/09/22  0201 03/08/22  2033 03/08/22  1423 03/08/22  1423    136 132*   < > 126*   K 3.5 3.4* 3.5   < > 4.2    104 96*   < > 90*   CO2 21 21 9*   < > 11*   * 119* 390*   < > 601*   BUN 31* 34* 34*   < > 28*   CREA 2.09* 2.07* 2.14*   < > 1.94*   CA 8.7 8.4* 8.5   < > 8.4*   MG 2.4 2.3 2.7*  --   --    ALB  --   --   --   --  1.7*   TBILI  --   --   --   --  0.5   ALT  --   --   --   --  16    < > = values in this interval not displayed.        Signed: Andrew Cheng MD

## 2022-03-09 NOTE — PROGRESS NOTES
Transition of Care Plan:    RUR: 24%  Disposition: Home with Follow-up appointment  Follow up appointments: PCP  DME needed: None  Transportation at Discharge: Pt's mother will transport at d/c.   [employer] or means to access home:   Pt has access      Medicare Letter: N/A  Is patient a BCPI-A Bundle: N/A          If yes, was Bundle Letter given?: N/A   Is patient a  and connected with the [address]? No               If yes, was [place] transfer form completed and [facility] notified? Caregiver Contact: Ulises Lamb. Mother [phone]  Discharge Caregiver contacted prior to discharge? CM will notify caregiver at d/c. Care Conference needed?:    No    Reason for Admission:   DKA    Last admission- 2/8/22-2/14/22 DKA               RUR Score:   24 High risk         PCP: First and Last name:  Pineda Alonso MD     Name of Practice:    Are you a current patient: Yes/No: Yes   Approximate date of last visit: Week or two weeks ago   Can you do a virtual visit with your PCP: Yes             Resources/supports as identified by patient/family:  Pt has a good support system                 Top Challenges facing patient (as identified by patient/family and CM): Finances/Medication cost?   No issues with finances/medication cost. Pt has [address] Complete                Transportation? Pt does not drive. Pt's mother will transport at d/c. Support system or lack thereof? Pt has his mother for support                     Living arrangements? Pt resides with her mother in an one level home with 0 ARIELLE. Self-care/ADLs/Cognition? Pt is independent with ADL's and IADL's. Pt was alert and oriented. Current Advanced Directive/Advance Care Plan:  Full Code; No ACP on file. Pt did not want to address ACP.  Pt stated that his mother would be his primary decision Deon (ACP) Conversation      Date of Conversation: 3/9/2022  Conducted with: Patient with Decision Making Capacity    Healthcare Decision Maker:     Primary Decision Maker: Doug Chun Mother - [phone]  Click here to complete [address] including selection of the Healthcare Decision Maker Relationship (ie \"Primary\")      Today we documented Decision Maker(s) consistent with Legal Next of Kin hierarchy. Content/Action Overview:   DECLINED ACP conversation - will revisit periodically   Reviewed DNR/DNI and patient elects Full Code (Attempt Resuscitation)  Length of Voluntary ACP Conversation in minutes:  16 minutes              Healthcare Decision Maker:   Click here to complete [address] including selection of the Healthcare Decision Maker Relationship (ie \"Primary\")      Primary Decision MakerTodmarva Beth - [phone]    Payor Source Payor: [facility] [place] / Plan: [address] / Product Type: Managed Care Medicaid /                             Plan for utilizing home health:  Pt has not had home health in the past. Pt will not be utilizing home health at d/c.                    CM met with pt at bedside to discuss d/c plan. Pt was alert and oriented. CM verified pt's demographics, insurance and PCP. Pt is a 45 y/o  readmitted to [facility] on 3/8/2022 for DKA. Pt uses [facility] in [place] for Rx. No DME's. No HH, SNF or IPR in the past. Pt is FULL code status. No ACP on file. Pt's mother will transport at d/c. Care Management Interventions  PCP Verified by CM: Yes (Pt's PCP is Dr. Nina Schwarz)  Palliative Care Criteria Met (RRAT>21 & CHF Dx)?: No  Mode of Transport at Discharge:  Other (see comment) (Pt's mother will transport at d/c. )  Transition of Care Consult (CM Consult): Discharge Planning (Home with Follow-up appointments. )  Discharge Durable Medical Equipment: No (No DME's)  Physical Therapy Consult: No  Occupational Therapy Consult: No  Speech Therapy Consult: No  Support Systems: Parent(s) (Pt resides with his mother in an one level home. )  Confirm Follow Up Transport: Family (Pt does not drive. )  [employer] Provided?: No  Discharge Location  Patient Expects to be Discharged to[de-identified]  (Home with Follow-up appointment. )    Readmission Assessment  Number of days since last admission?: 8-30 days  Previous disposition: Home with Family  Who is being interviewed?: Patient  What was the patient's/caregiver's perception as to why they think they needed to return back to the hospital?: Other (Comment)  Did you visit your Primary Care Physician after you left the hospital, before you returned this time?: Yes  Did you see a specialist, such as Cardiac, Pulmonary, Orthopedic Physician, etc. after you left the hospital?: No  Who advised the patient to return to the hospital?: Self-referral  Does the patient report anything that got in the way of taking their medications?: No  In our efforts to provide the best possible care to you and others like you, can you think of anything that we could have done to help you after you left the hospital the first time, so that you might not have needed to return so soon?: Other (Comment)    CM will continue to follow patient for discharge planning needs and arrange for services as deemed necessary.     Hayes Mendez [address] [place] [address]  [phone]

## 2022-03-10 LAB
ANION GAP SERPL CALC-SCNC: 8 MMOL/L (ref 5–15)
BACTERIA SPEC CULT: ABNORMAL
BUN SERPL-MCNC: 25 MG/DL (ref 6–20)
BUN/CREAT SERPL: 15 (ref 12–20)
CALCIUM SERPL-MCNC: 7.4 MG/DL (ref 8.5–10.1)
CC UR VC: ABNORMAL
CHLORIDE SERPL-SCNC: 102 MMOL/L (ref 97–108)
CO2 SERPL-SCNC: 25 MMOL/L (ref 21–32)
CREAT SERPL-MCNC: 1.67 MG/DL (ref 0.7–1.3)
GLUCOSE BLD STRIP.AUTO-MCNC: 101 MG/DL (ref 65–117)
GLUCOSE BLD STRIP.AUTO-MCNC: 121 MG/DL (ref 65–117)
GLUCOSE BLD STRIP.AUTO-MCNC: 128 MG/DL (ref 65–117)
GLUCOSE BLD STRIP.AUTO-MCNC: 31 MG/DL (ref 65–117)
GLUCOSE BLD STRIP.AUTO-MCNC: 31 MG/DL (ref 65–117)
GLUCOSE BLD STRIP.AUTO-MCNC: 37 MG/DL (ref 65–117)
GLUCOSE BLD STRIP.AUTO-MCNC: 95 MG/DL (ref 65–117)
GLUCOSE SERPL-MCNC: 60 MG/DL (ref 65–100)
MAGNESIUM SERPL-MCNC: 2.1 MG/DL (ref 1.6–2.4)
POTASSIUM SERPL-SCNC: 2.9 MMOL/L (ref 3.5–5.1)
SERVICE CMNT-IMP: ABNORMAL
SERVICE CMNT-IMP: NORMAL
SERVICE CMNT-IMP: NORMAL
SODIUM SERPL-SCNC: 135 MMOL/L (ref 136–145)

## 2022-03-10 PROCEDURE — 74011250636 HC RX REV CODE- 250/636: Performed by: STUDENT IN AN ORGANIZED HEALTH CARE EDUCATION/TRAINING PROGRAM

## 2022-03-10 PROCEDURE — 99232 SBSQ HOSP IP/OBS MODERATE 35: CPT

## 2022-03-10 PROCEDURE — 83735 ASSAY OF MAGNESIUM: CPT

## 2022-03-10 PROCEDURE — 74011250637 HC RX REV CODE- 250/637: Performed by: STUDENT IN AN ORGANIZED HEALTH CARE EDUCATION/TRAINING PROGRAM

## 2022-03-10 PROCEDURE — 74011636637 HC RX REV CODE- 636/637: Performed by: INTERNAL MEDICINE

## 2022-03-10 PROCEDURE — 74011000250 HC RX REV CODE- 250: Performed by: STUDENT IN AN ORGANIZED HEALTH CARE EDUCATION/TRAINING PROGRAM

## 2022-03-10 PROCEDURE — 36415 COLL VENOUS BLD VENIPUNCTURE: CPT

## 2022-03-10 PROCEDURE — 82962 GLUCOSE BLOOD TEST: CPT

## 2022-03-10 PROCEDURE — 80048 BASIC METABOLIC PNL TOTAL CA: CPT

## 2022-03-10 PROCEDURE — 74011250637 HC RX REV CODE- 250/637: Performed by: INTERNAL MEDICINE

## 2022-03-10 PROCEDURE — 65660000000 HC RM CCU STEPDOWN

## 2022-03-10 PROCEDURE — 74011000258 HC RX REV CODE- 258: Performed by: STUDENT IN AN ORGANIZED HEALTH CARE EDUCATION/TRAINING PROGRAM

## 2022-03-10 RX ORDER — PREGABALIN 25 MG/1
75 CAPSULE ORAL 2 TIMES DAILY
Status: DISCONTINUED | OUTPATIENT
Start: 2022-03-10 | End: 2022-03-12 | Stop reason: HOSPADM

## 2022-03-10 RX ORDER — INSULIN GLARGINE 100 [IU]/ML
18 INJECTION, SOLUTION SUBCUTANEOUS
Status: DISCONTINUED | OUTPATIENT
Start: 2022-03-10 | End: 2022-03-12 | Stop reason: HOSPADM

## 2022-03-10 RX ORDER — INSULIN LISPRO 100 [IU]/ML
4 INJECTION, SOLUTION INTRAVENOUS; SUBCUTANEOUS
Status: DISCONTINUED | OUTPATIENT
Start: 2022-03-10 | End: 2022-03-12

## 2022-03-10 RX ORDER — AMLODIPINE BESYLATE 5 MG/1
10 TABLET ORAL DAILY
Status: DISCONTINUED | OUTPATIENT
Start: 2022-03-11 | End: 2022-03-12 | Stop reason: HOSPADM

## 2022-03-10 RX ORDER — POTASSIUM CHLORIDE 20 MEQ/1
60 TABLET, EXTENDED RELEASE ORAL
Status: COMPLETED | OUTPATIENT
Start: 2022-03-10 | End: 2022-03-10

## 2022-03-10 RX ADMIN — CARVEDILOL 12.5 MG: 12.5 TABLET, FILM COATED ORAL at 16:53

## 2022-03-10 RX ADMIN — CEFTRIAXONE 1 G: 1 INJECTION, POWDER, FOR SOLUTION INTRAMUSCULAR; INTRAVENOUS at 16:53

## 2022-03-10 RX ADMIN — ROSUVASTATIN 40 MG: 20 TABLET, FILM COATED ORAL at 22:37

## 2022-03-10 RX ADMIN — POTASSIUM CHLORIDE 60 MEQ: 20 TABLET, EXTENDED RELEASE ORAL at 10:50

## 2022-03-10 RX ADMIN — DULOXETINE HYDROCHLORIDE 60 MG: 30 CAPSULE, DELAYED RELEASE ORAL at 08:41

## 2022-03-10 RX ADMIN — SODIUM CHLORIDE, PRESERVATIVE FREE 10 ML: 5 INJECTION INTRAVENOUS at 06:46

## 2022-03-10 RX ADMIN — Medication 4 UNITS: at 11:49

## 2022-03-10 RX ADMIN — MORPHINE SULFATE 1 MG: 2 INJECTION, SOLUTION INTRAMUSCULAR; INTRAVENOUS at 22:42

## 2022-03-10 RX ADMIN — MORPHINE SULFATE 1 MG: 2 INJECTION, SOLUTION INTRAMUSCULAR; INTRAVENOUS at 16:54

## 2022-03-10 RX ADMIN — SODIUM CHLORIDE 75 ML/HR: 9 INJECTION, SOLUTION INTRAVENOUS at 13:17

## 2022-03-10 RX ADMIN — MORPHINE SULFATE 1 MG: 2 INJECTION, SOLUTION INTRAMUSCULAR; INTRAVENOUS at 09:52

## 2022-03-10 RX ADMIN — ONDANSETRON HYDROCHLORIDE 4 MG: 2 INJECTION, SOLUTION INTRAMUSCULAR; INTRAVENOUS at 02:45

## 2022-03-10 RX ADMIN — ENOXAPARIN SODIUM 40 MG: 100 INJECTION SUBCUTANEOUS at 08:45

## 2022-03-10 RX ADMIN — AMLODIPINE BESYLATE 5 MG: 5 TABLET ORAL at 08:42

## 2022-03-10 RX ADMIN — DEXTROSE MONOHYDRATE 250 ML: 10 INJECTION, SOLUTION INTRAVENOUS at 08:25

## 2022-03-10 RX ADMIN — SODIUM CHLORIDE, PRESERVATIVE FREE 20 ML: 5 INJECTION INTRAVENOUS at 13:16

## 2022-03-10 RX ADMIN — INSULIN GLARGINE 18 UNITS: 100 INJECTION, SOLUTION SUBCUTANEOUS at 22:38

## 2022-03-10 RX ADMIN — SODIUM CHLORIDE, PRESERVATIVE FREE 10 ML: 5 INJECTION INTRAVENOUS at 22:00

## 2022-03-10 RX ADMIN — FINASTERIDE 5 MG: 5 TABLET, FILM COATED ORAL at 08:42

## 2022-03-10 RX ADMIN — PREGABALIN 75 MG: 25 CAPSULE ORAL at 22:37

## 2022-03-10 RX ADMIN — PANTOPRAZOLE SODIUM 40 MG: 40 TABLET, DELAYED RELEASE ORAL at 08:43

## 2022-03-10 RX ADMIN — CARVEDILOL 12.5 MG: 12.5 TABLET, FILM COATED ORAL at 08:43

## 2022-03-10 RX ADMIN — MORPHINE SULFATE 1 MG: 2 INJECTION, SOLUTION INTRAMUSCULAR; INTRAVENOUS at 02:45

## 2022-03-10 RX ADMIN — Medication 4 UNITS: at 16:53

## 2022-03-10 RX ADMIN — PREGABALIN 75 MG: 25 CAPSULE ORAL at 08:42

## 2022-03-10 NOTE — DIABETES MGMT
[facility]    CLINICAL NURSE SPECIALIST CONSULT     Initial Presentation   Swapna Domignuez is a 46 y.o. male admitted 3/08/2022 with severe nausea, vomiting, abdominal pain, weakness over the last 1 week. Patient reports blood sugars have been running in the 3-400 range. He reports taking both his insulin and Lantus as prescribed. He was seen about a week ago and had hyperglycemia without evidence of DKA and was discharged to home. Admission glucose 601. Anion gap 25. Creatine 1.94. GFR 39. A1c 11.4%    HX:   Past Medical History:   Diagnosis Date    Bipolar 1 disorder, depressed ([address]     Bipolar disorder ([address]     Depression     Diabetes ([address]     DKA, type 1 ([address] 1/27/2013    diagnosed age 23    H/O noncompliance with medical treatment, presenting hazards to health     MRSA (methicillin resistant staph aureus) culture positive     MRSA (methicillin resistant Staphylococcus aureus)     Face    Noncompliance with medication regimen     Second hand smoke exposure     Seizure ([address]     Seizures ([address] 2006 or 2007    one episode during senior care        INITIAL DX:   DKA (diabetic ketoacidosis) ([address] [E11.10]  FELECIA (acute kidney injury) ([address] [N17.9]  UTI (urinary tract infection) [N39.0]     Current Treatment     TX: ABx. Insulin( transitioning off gluco-stabilizer), GI prophylaxis. Anti-nausea medication. Clot Prevention. IVF    Consulted by Provider for advanced diabetes nursing assessment and care for:   [x] Transitioning off Debbie Linear   [x] Inpatient management strategy  [x] Home management assessment  [x] Survival skill education    Hospital Course   Clinical progress has been complicated by DKA    Diabetes History   The patient reports a 20 year history of Type 1 diabetes. He has a positive family hx of diabetes (mom & niece). He reports taking 30 units of Lantus daily and 10 ( sliding scale) units of Humalog with meals.  He follows with a PCP, Karen Goel MD, for diabetes management. Diabetes-related Medical History  Acute complications  DKA  Neurological complications  Peripheral neuropathy  Microvascular disease  Retinopathy    Diabetes Medication History  Key Antihyperglycemic Medications             insulin glargine (Lantus Solostar U-100 Insulin) 100 unit/mL (3 mL) inpn INJECT 30 UNITS SUBCUTANEOUSLY DAILY    insulin aspart U-100 (NovoLOG Flexpen U-100 Insulin) 100 unit/mL (3 mL) inpn 10 Units by SubCUTAneous route Before breakfast, lunch, and dinner. (Novolog or Humalog, whichever more preferred: Inject 5 units with each meal + correction insulin, up to 30 units per day           Taking medications pattern  [x]? Consistent administration  [x]? Affordable  Social determinants of health impacting diabetes self-management practices   Concerned that you need to know more about how to stay healthy with diabetes  Overall evaluation:               [x]? Not achieving A1c target with drug therapy & self-care practices    Subjective   I'm hungry     Objective   Physical exam  General Normal weight male; ill-appearing. Conversant and cooperative  Neuro  Alert, oriented   Vital Signs   Visit Vitals  BP (!) 161/96 (BP 1 Location: Right upper arm, BP Patient Position: At rest)   Pulse 75   Temp 97.8 °F (36.6 °C)   Resp 15   Ht 5' 9\" (1.753 m)   Wt 70.8 kg (156 lb)   SpO2 100%   BMI 23.04 kg/m²   . Laboratory  Recent Labs     03/10/22  0433 03/09/22  0701 03/09/22  0201 03/08/22  2033 03/08/22  1423   GLU 60* 103* 119*   < > 601*   AGAP 8 12 11   < > 25*   WBC  --   --   --   --  10.9   CREA 1.67* 2.09* 2.07*   < > 1.94*   GFRNA 46* 36* 36*   < > 39*   AST  --   --   --   --  10*   ALT  --   --   --   --  16    < > = values in this interval not displayed.        Factors impacting BG management  Factor Dose Comments   Nutrition:  Standard meals     60 grams/meal      Pain Prn morphine    Infection Rocephin 1gm q 24 hrs    Other:   Kidney function GFR 46        Blood glucose pattern      Significant diabetes-related events over the past 24-72 hours  Transitioning off gluco-stabilzer this afternoon  3/10/2022 Hypoglycemic episode this morning    Assessment and Plan   Nursing Diagnosis Risk for unstable blood glucose pattern   Nursing Intervention Domain 5202 Decision-making Support   Nursing Interventions Examined current inpatient diabetes/blood glucose control   Explored factors facilitating and impeding inpatient management  Explored corrective strategies with patient and responsible inpatient provider   Informed patient of rational for insulin strategy while hospitalized       Evaluation   This 46year old  male with Type 1 diabetes has not maintained BG control prior to admission as evidence of an A1c of 11.4%. The patient has multiple ER visits and admissions for hyperglycemia/DKA in the past year. The patient was last admitted one month ago. The patient expected to transition off gluco-stabilizer this afternoon on 15 units of Lantus. He is scheduled to receive his meal tray this morning. He still complains of nausea and has a hx of gastroparesis. The patient reports that his home dose is 30 units Lantus daily. On his last admission he did well on 35 units Lantus daily and meal time insulin. Diabetes management will continue to follow with this patient, monitoring BG trends and making recommendations as needed. 3/10/2022 Pt had a hypoglycemic episode this morning after receiving 45 units Lantus and correction insulin. The patient is now scheduled for 18 units Lantus to start this evening and 4 units meal time insulin. This is an appropriate dose for now. Diabetes management will continue to follow with this patient, monitoring BG's and making recommendation as needed. This patient would benefit from diabetes self-management education and support [facility] after [facility].     Recommendations     [x] Use of Subcutaneous Insulin Order set (1935)  Insulin Dosing Specific recommendation   Basal                                      (Based on weight, BMI & GFR) []        0.2 units/kg/D  [] 0.3 units/kg/D  [] 0.5 units/kg/D  Continue 18 units Lantus today   Nutritional                                      (Based on CHO/dextrose load) [x] Normal sensitivity  [] Insulin-resistant sensitivity Continue 4 units Humalog with meals    Corrective                                       (Useful in adjusting insulin dosing) [] Normal sensitivity  [x] HIGH sensitivity  [] Insulin-resistant sensitivity   Switch to High sensitivity scale         [x] Referral to   [facility] (Phone [phone] to schedule appointment) for [facility]      Billing Code(s)   [] 42058 IP subsequent hospital care - 35 minutes [] 43706 Prolonged Services - 65 minutes [] 00520 Prolonged Services - 110 minutes  [x] 11236 IP subsequent hospital care - 25 minutes [] 22233 Prolonged Services - 55 minutes [] 93935 Prolonged Services - 100 minutes  [] 81608 IP subsequent hospital care - 15 minutes [] [id number] Prolonged Services - 45 minutes [] 48383 Prolonged Services - 90 minutes    Before making these care recommendations, I personally reviewed the hospitalization record, including notes, laboratory & diagnostic data and current medications, and examined the patient at the bedside (circumstances permitting) before making care recommendations. More than fifty (50) percent of the time was spent in patient counseling and/or care coordination.   Total minutes: 25 minutes    ANEESH Maldonado  Diabetes Clinical Nurse Specialist  [facility]  Access via [employer]

## 2022-03-10 NOTE — PROGRESS NOTES
Attempted to call report but was informed that the RN was in the room with a patient. Rep recorded this RN's name and number to call back. [phone]   TRANSFER - OUT REPORT:    Verbal report given to Lewis Allen (name) on Angelia Padilla  being transferred to [facility] (unit) for routine progression of care       Report consisted of patients Situation, Background, Assessment and   Recommendations(SBAR). Information from the following report(s) SBAR, Kardex, Intake/Output, MAR, Recent Results and Cardiac Rhythm NSR was reviewed with the receiving nurse. Lines:   Peripheral IV 03/08/22 Right Hand (Active)   Site Assessment Clean, dry, & intact 03/10/22 1211   Phlebitis Assessment 0 03/10/22 1211   Infiltration Assessment 0 03/10/22 1211   Dressing Status Clean, dry, & intact 03/10/22 1211   Dressing Type Tape;Transparent 03/10/22 1211   Hub Color/Line Status Pink; Infusing 03/10/22 1211   Action Taken Open ports on tubing capped 03/10/22 1010   Alcohol Cap Used Yes 03/10/22 1211       Peripheral IV 03/08/22 Left Antecubital (Active)   Site Assessment Clean, dry, & intact 03/10/22 1211   Phlebitis Assessment 0 03/10/22 1211   Infiltration Assessment 0 03/10/22 1211   Dressing Status Clean, dry, & intact 03/10/22 1211   Dressing Type Tape;Transparent 03/10/22 1211   Hub Color/Line Status Green;Capped 03/10/22 1211   Action Taken Open ports on tubing capped 03/10/22 1010   Alcohol Cap Used Yes 03/10/22 1211        Opportunity for questions and clarification was provided.       Patient transported with:   [employer]

## 2022-03-10 NOTE — PROGRESS NOTES
Hospitalist Progress Note    NAME: Pauline Mendez   :  1980   MRN:  [id number]       Assessment / Plan:    Diabetic ketoacidosis  Noncompliance  Metabolic acidosis  CKD stage IIIb  UTI/cystitis  Hypokalemia  -Chest x-ray-no acute finding. -CTA abdomen/pelvis- 1. Study significantly limited by motion. 2.  Markedly distended bladder with intraluminal gas, correlate for recent  instrumentation and/or cystitis. -DKA resolved, patient was placed on 35 units of Lantus with sliding scale  -He is extremely hypoglycemic this morning  -Adjust insulin and transition Lantus to 18 units with 4 units of Humalog premeal  -Replace potassium  -Placed on diabetic diet  -UA suggestive of UTI so started on antibiotics. WBC also elevated. -Blood culture pending.  -Urine culture growing 100,000 colonies of gram-negative rods  -Started on Rocephin. Continue the Rocephin.  -Diabetic management consulted. -IV fluids changed today. -Creatinine 2.09, baseline is around 1.9. It has improved to 1.6  -US renal did not show any evidence of hydronephrosis  -Patient was catheterized twice yesterday, he was able to void on his own this morning    Urinary retention  BPH  -He was catheterized twice yesterday  -He was able to void this morning  -Retention is like cystitis  -Urine culture growing gram-negative rods  -Continue ceftriaxone, await sensitivities     Hypertension  Hyperlipidemia  Anxiety disorder  GERD  -Continue home medications. 18.5 - 24.9 Normal weight / Body mass index is 23.04 kg/m². Estimated discharge date:   Barriers:    Code status: Full  Prophylaxis: Hep SQ  Recommended Disposition: Home w/Family     Subjective:     Chief Complaint / Reason for Physician Visit  Noticed to be hypoglycemic this morning, was able to void this morning.   Denies any nausea or vomiting, denies any fever chills    Review of Systems:  Symptom Y/N Comments  Symptom Y/N Comments   Fever/Chills    Chest Pain     Poor Appetite    Edema     Cough    Abdominal Pain     Sputum    Joint Pain     SOB/JOHNSTON    Pruritis/Rash     Nausea/vomit    Tolerating PT/OT     Diarrhea    Tolerating Diet     Constipation    Other       Could NOT obtain due to:      Objective:     VITALS:   Last 24hrs VS reviewed since prior progress note. Most recent are:  Patient Vitals for the past 24 hrs:   Temp Pulse Resp BP SpO2   03/10/22 0730 97.3 °F (36.3 °C) 70 17 (!) 174/93 98 %   03/10/22 0330 98 °F (36.7 °C) 75 13 122/74 98 %   03/10/22 0004 97.9 °F (36.6 °C) 81 13 134/84 97 %   03/09/22 1913 97.8 °F (36.6 °C) 91 15 (!) 150/88 98 %   03/09/22 1543 98.2 °F (36.8 °C) 97 13 (!) 170/91 --   03/09/22 1133 97.6 °F (36.4 °C) 100 18 (!) 156/87 100 %       Intake/Output Summary (Last 24 hours) at 3/10/2022 1044  Last data filed at 3/10/2022 6221  Gross per 24 hour   Intake 2170 ml   Output 2245 ml   Net -75 ml        I had a face to face encounter and independently examined this patient on 3/10/2022, as outlined below:  PHYSICAL EXAM:  General: WD, WN. Alert, cooperative, no acute distress    EENT:  EOMI. Anicteric sclerae. MMM  Resp:  CTA bilaterally, no wheezing or rales. No accessory muscle use  CV:  Regular  rhythm,  No edema  GI:  Soft, Non distended, Non tender. +Bowel sounds  Neurologic:  Alert and oriented X 3, normal speech,   Psych:   Good insight. Not anxious nor agitated  Skin:  No rashes.   No jaundice    Reviewed most current lab test results and cultures  YES  Reviewed most current radiology test results   YES  Review and summation of old records today    NO  Reviewed patient's current orders and MAR    YES  PMH/SH reviewed - no change compared to H&P  ________________________________________________________________________  Care Plan discussed with:    Comments   Patient x    Family      RN x    Care Manager     Consultant                       x Multidiciplinary team rounds were held today with , nursing, pharmacist and clinical coordinator. Patient's plan of care was discussed; medications were reviewed and discharge planning was addressed. ________________________________________________________________________  Total NON critical care TIME:  35  Minutes    Total CRITICAL CARE TIME Spent:   Minutes non procedure based      Comments   >50% of visit spent in counseling and coordination of care     ________________________________________________________________________  Luke Mackenzie MD     Procedures: see electronic medical records for all procedures/Xrays and details which were not copied into this note but were reviewed prior to creation of Plan. LABS:  I reviewed today's most current labs and imaging studies. Pertinent labs include:  Recent Labs     03/08/22  1423   WBC 10.9   HGB 11.9*   HCT 37.1   *     Recent Labs     03/10/22  0433 03/09/22  0701 03/09/22  0201 03/08/22  2033 03/08/22  1423   * 136 136   < > 126*   K 2.9* 3.5 3.4*   < > 4.2    103 104   < > 90*   CO2 25 21 21   < > 11*   GLU 60* 103* 119*   < > 601*   BUN 25* 31* 34*   < > 28*   CREA 1.67* 2.09* 2.07*   < > 1.94*   CA 7.4* 8.7 8.4*   < > 8.4*   MG 2.1 2.4 2.3   < >  --    ALB  --   --   --   --  1.7*   TBILI  --   --   --   --  0.5   ALT  --   --   --   --  16    < > = values in this interval not displayed.        Signed: Luke Mackenzie MD

## 2022-03-10 NOTE — PROGRESS NOTES
Received message from patient's nurse stating:    Straight cathed morning of 3/9 for retention, able to void during the day but now retaining 550 again. Would you like me to straight cath or place hansen? Thanks         Discussion / orders:    Patient was also treated for UTI with Rocephin which he will be completing today  · Straight cath ordered         Please note that this note was dictated using Dragon computer voice recognition software. Quite often unanticipated grammatical, syntax, homophones, and other interpretive errors are inadvertently transcribed by the computer software. Please disregard these errors. Please excuse any errors that have escaped final proofreading.      Signed by:  Matilda Salcido DNP, ACNP-BC

## 2022-03-10 NOTE — PROGRESS NOTES
HYPOGLYCEMIC EPISODE DOCUMENTATION    Patient with hypoglycemic episode(s) at 0813(time) on 3/10/22(date). BG value(s) pre-treatment 32    Was patient symptomatic?  [x] yes, [] no  Patient was treated with the following rescue medications/treatments: [x] D50                [] Glucose tablets                [] Glucagon                [] 4oz juice                [] 6oz reg soda                [] 8oz low fat milk  BG value post-treatment: 121  Once BG treated and value greater than 80mg/dl, pt was provided with the following:  [] snack  [x] meal

## 2022-03-10 NOTE — PROGRESS NOTES
1900- Bedside shift change report given to Jonny Carty RN (oncoming nurse) by Seda Weldon RN (offgoing nurse). Report included the following information SBAR, Kardex, ED Summary, Intake/Output, MAR, Recent Results, Med Rec Status and Cardiac Rhythm NSR.     0645- Retention overnight, straight cath performed. 0700- End of Shift Note    Bedside shift change report given to Beverly Wild RN (oncoming nurse) by Rahel Oconnor RN (offgoing nurse). Report included the following information SBAR, Kardex, ED Summary, Intake/Output, MAR, Recent Results, Med Rec Status and Cardiac Rhythm NSR    Shift worked:  7p-7a     Shift summary and any significant changes:     No acute changes overnight. Concerns for physician to address:       Zone phone for oncoming shift:          Activity:  Activity Level: Up with Assistance  Number times ambulated in hallways past shift: 0  Number of times OOB to chair past shift: 0    Cardiac:   Cardiac Monitoring: Yes      Cardiac Rhythm: Sinus Rhythm    Access:   Current line(s): PIV     Genitourinary:   Urinary status: voiding    Respiratory:   O2 Device: None (Room air)  Chronic home O2 use?: NO  Incentive spirometer at bedside: YES       GI:  Last Bowel Movement Date: 03/07/22  Current diet:  ADULT DIET Clear Liquid; 4 carb choices (60 gm/meal); Low Fat/Low Chol/High Fiber/2 gm Na  Passing flatus: YES  Tolerating current diet: YES       Pain Management:   Patient states pain is manageable on current regimen: YES    Skin:  Corey Score: 19  Interventions: increase time out of bed, PT/OT consult and nutritional support     Patient Safety:  Fall Score:  Total Score: 2  Interventions: bed/chair alarm, assistive device (walker, cane, etc), gripper socks and pt to call before getting OOB  High Fall Risk: Yes    Length of Stay:  Expected LOS: 3d 2h  Actual LOS: [facility], RN

## 2022-03-11 LAB
ANION GAP SERPL CALC-SCNC: 4 MMOL/L (ref 5–15)
BUN SERPL-MCNC: 20 MG/DL (ref 6–20)
BUN/CREAT SERPL: 12 (ref 12–20)
CALCIUM SERPL-MCNC: 7.5 MG/DL (ref 8.5–10.1)
CHLORIDE SERPL-SCNC: 103 MMOL/L (ref 97–108)
CO2 SERPL-SCNC: 25 MMOL/L (ref 21–32)
CREAT SERPL-MCNC: 1.63 MG/DL (ref 0.7–1.3)
GLUCOSE BLD STRIP.AUTO-MCNC: 110 MG/DL (ref 65–117)
GLUCOSE BLD STRIP.AUTO-MCNC: 111 MG/DL (ref 65–117)
GLUCOSE BLD STRIP.AUTO-MCNC: 116 MG/DL (ref 65–117)
GLUCOSE BLD STRIP.AUTO-MCNC: 142 MG/DL (ref 65–117)
GLUCOSE BLD STRIP.AUTO-MCNC: 173 MG/DL (ref 65–117)
GLUCOSE BLD STRIP.AUTO-MCNC: 237 MG/DL (ref 65–117)
GLUCOSE BLD STRIP.AUTO-MCNC: 90 MG/DL (ref 65–117)
GLUCOSE BLD STRIP.AUTO-MCNC: 98 MG/DL (ref 65–117)
GLUCOSE SERPL-MCNC: 112 MG/DL (ref 65–100)
MAGNESIUM SERPL-MCNC: 2 MG/DL (ref 1.6–2.4)
POTASSIUM SERPL-SCNC: 3.7 MMOL/L (ref 3.5–5.1)
SERVICE CMNT-IMP: ABNORMAL
SERVICE CMNT-IMP: NORMAL
SODIUM SERPL-SCNC: 132 MMOL/L (ref 136–145)

## 2022-03-11 PROCEDURE — 82962 GLUCOSE BLOOD TEST: CPT

## 2022-03-11 PROCEDURE — 74011250637 HC RX REV CODE- 250/637: Performed by: INTERNAL MEDICINE

## 2022-03-11 PROCEDURE — 74011636637 HC RX REV CODE- 636/637: Performed by: INTERNAL MEDICINE

## 2022-03-11 PROCEDURE — 99232 SBSQ HOSP IP/OBS MODERATE 35: CPT

## 2022-03-11 PROCEDURE — 74011250637 HC RX REV CODE- 250/637: Performed by: STUDENT IN AN ORGANIZED HEALTH CARE EDUCATION/TRAINING PROGRAM

## 2022-03-11 PROCEDURE — 74011000250 HC RX REV CODE- 250: Performed by: STUDENT IN AN ORGANIZED HEALTH CARE EDUCATION/TRAINING PROGRAM

## 2022-03-11 PROCEDURE — 80048 BASIC METABOLIC PNL TOTAL CA: CPT

## 2022-03-11 PROCEDURE — 74011250636 HC RX REV CODE- 250/636: Performed by: STUDENT IN AN ORGANIZED HEALTH CARE EDUCATION/TRAINING PROGRAM

## 2022-03-11 PROCEDURE — 83735 ASSAY OF MAGNESIUM: CPT

## 2022-03-11 PROCEDURE — 65660000000 HC RM CCU STEPDOWN

## 2022-03-11 PROCEDURE — 36415 COLL VENOUS BLD VENIPUNCTURE: CPT

## 2022-03-11 PROCEDURE — 74011636637 HC RX REV CODE- 636/637: Performed by: STUDENT IN AN ORGANIZED HEALTH CARE EDUCATION/TRAINING PROGRAM

## 2022-03-11 RX ORDER — AMOXICILLIN AND CLAVULANATE POTASSIUM 875; 125 MG/1; MG/1
1 TABLET, FILM COATED ORAL EVERY 12 HOURS
Status: DISCONTINUED | OUTPATIENT
Start: 2022-03-11 | End: 2022-03-12 | Stop reason: HOSPADM

## 2022-03-11 RX ORDER — AMOXICILLIN AND CLAVULANATE POTASSIUM 875; 125 MG/1; MG/1
1 TABLET, FILM COATED ORAL EVERY 12 HOURS
Status: DISCONTINUED | OUTPATIENT
Start: 2022-03-11 | End: 2022-03-11

## 2022-03-11 RX ADMIN — MORPHINE SULFATE 1 MG: 2 INJECTION, SOLUTION INTRAMUSCULAR; INTRAVENOUS at 04:39

## 2022-03-11 RX ADMIN — FINASTERIDE 5 MG: 5 TABLET, FILM COATED ORAL at 09:00

## 2022-03-11 RX ADMIN — AMLODIPINE BESYLATE 10 MG: 5 TABLET ORAL at 09:00

## 2022-03-11 RX ADMIN — MORPHINE SULFATE 1 MG: 2 INJECTION, SOLUTION INTRAMUSCULAR; INTRAVENOUS at 10:50

## 2022-03-11 RX ADMIN — AMOXICILLIN AND CLAVULANATE POTASSIUM 1 TABLET: 875; 125 TABLET, FILM COATED ORAL at 10:50

## 2022-03-11 RX ADMIN — PANTOPRAZOLE SODIUM 40 MG: 40 TABLET, DELAYED RELEASE ORAL at 09:00

## 2022-03-11 RX ADMIN — PREGABALIN 75 MG: 25 CAPSULE ORAL at 22:35

## 2022-03-11 RX ADMIN — Medication 2 UNITS: at 15:41

## 2022-03-11 RX ADMIN — PREGABALIN 75 MG: 25 CAPSULE ORAL at 09:00

## 2022-03-11 RX ADMIN — INSULIN GLARGINE 18 UNITS: 100 INJECTION, SOLUTION SUBCUTANEOUS at 22:36

## 2022-03-11 RX ADMIN — CARVEDILOL 12.5 MG: 12.5 TABLET, FILM COATED ORAL at 16:34

## 2022-03-11 RX ADMIN — SODIUM CHLORIDE, PRESERVATIVE FREE 10 ML: 5 INJECTION INTRAVENOUS at 06:08

## 2022-03-11 RX ADMIN — Medication 4 UNITS: at 15:41

## 2022-03-11 RX ADMIN — AMOXICILLIN AND CLAVULANATE POTASSIUM 1 TABLET: 875; 125 TABLET, FILM COATED ORAL at 22:36

## 2022-03-11 RX ADMIN — MORPHINE SULFATE 1 MG: 2 INJECTION, SOLUTION INTRAMUSCULAR; INTRAVENOUS at 22:36

## 2022-03-11 RX ADMIN — CARVEDILOL 12.5 MG: 12.5 TABLET, FILM COATED ORAL at 09:00

## 2022-03-11 RX ADMIN — SODIUM CHLORIDE, PRESERVATIVE FREE 10 ML: 5 INJECTION INTRAVENOUS at 15:42

## 2022-03-11 RX ADMIN — SODIUM CHLORIDE 75 ML/HR: 9 INJECTION, SOLUTION INTRAVENOUS at 03:07

## 2022-03-11 RX ADMIN — SODIUM CHLORIDE, PRESERVATIVE FREE 10 ML: 5 INJECTION INTRAVENOUS at 22:37

## 2022-03-11 RX ADMIN — ROSUVASTATIN 40 MG: 20 TABLET, FILM COATED ORAL at 22:36

## 2022-03-11 RX ADMIN — ENOXAPARIN SODIUM 40 MG: 100 INJECTION SUBCUTANEOUS at 09:00

## 2022-03-11 RX ADMIN — Medication 4 UNITS: at 13:45

## 2022-03-11 RX ADMIN — MORPHINE SULFATE 1 MG: 2 INJECTION, SOLUTION INTRAMUSCULAR; INTRAVENOUS at 16:35

## 2022-03-11 RX ADMIN — DULOXETINE HYDROCHLORIDE 60 MG: 30 CAPSULE, DELAYED RELEASE ORAL at 09:00

## 2022-03-11 RX ADMIN — Medication 3 UNITS: at 13:45

## 2022-03-11 NOTE — PROGRESS NOTES
Pt brought it to writers attention throughout the day he has developed scrotal edema. trace -+1,   Writer requested to stop IVF, as patient has great intake.   Pt denies any pain or injury  Paged  MD [place] IVF, CONSULT PT/OT pt to get out of bed more

## 2022-03-11 NOTE — DIABETES MGMT
[facility]    CLINICAL NURSE SPECIALIST CONSULT     Initial Presentation   Leisa Bazzi is a 46 y.o. male admitted 3/08/2022 with severe nausea, vomiting, abdominal pain, weakness over the last 1 week. Patient reports blood sugars have been running in the 3-400 range. He reports taking both his insulin and Lantus as prescribed. He was seen about a week ago and had hyperglycemia without evidence of DKA and was discharged to home. Admission glucose 601. Anion gap 25. Creatine 1.94. GFR 39. A1c 11.4%    HX:   Past Medical History:   Diagnosis Date    Bipolar 1 disorder, depressed ([address]     Bipolar disorder ([address]     Depression     Diabetes ([address]     DKA, type 1 ([address] 1/27/2013    diagnosed age 23    H/O noncompliance with medical treatment, presenting hazards to health     MRSA (methicillin resistant staph aureus) culture positive     MRSA (methicillin resistant Staphylococcus aureus)     Face    Noncompliance with medication regimen     Second hand smoke exposure     Seizure ([address]     Seizures ([address] 2006 or 2007    one episode during [facility]        INITIAL DX:   DKA (diabetic ketoacidosis) ([address] [E11.10]  FELECIA (acute kidney injury) ([address] [N17.9]  UTI (urinary tract infection) [N39.0]     Current Treatment     TX: ABx. Insulin. GI prophylaxis. Anti-nausea medication. Clot Prevention. IVF    Consulted by Provider for advanced diabetes nursing assessment and care for:   [x] Transitioning off [place]   [x] Inpatient management strategy  [x] Home management assessment  [x] Survival skill education    Hospital Course   Clinical progress has been complicated by DKA  0/60/8007 Reports feeling better today. Tolerating meals. Expected to be discharged today. Diabetes History   The patient reports a 20 year history of Type 1 diabetes. He has a positive family hx of diabetes (mom & niece).  He reports taking 30 units of Lantus daily and 10 ( sliding scale) units of Humalog with meals. He follows with a PCP, Shade Amezcua MD, for diabetes management. Diabetes-related Medical History  Acute complications  DKA  Neurological complications  Peripheral neuropathy  Microvascular disease  Retinopathy    Diabetes Medication History  Key Antihyperglycemic Medications             insulin glargine (Lantus Solostar U-100 Insulin) 100 unit/mL (3 mL) inpn INJECT 30 UNITS SUBCUTANEOUSLY DAILY    insulin aspart U-100 (NovoLOG Flexpen U-100 Insulin) 100 unit/mL (3 mL) inpn 10 Units by SubCUTAneous route Before breakfast, lunch, and dinner. (Novolog or Humalog, whichever more preferred: Inject 5 units with each meal + correction insulin, up to 30 units per day           Taking medications pattern  [x]? Consistent administration  [x]? Affordable  Social determinants of health impacting diabetes self-management practices   Concerned that you need to know more about how to stay healthy with diabetes  Overall evaluation:               [x]? Not achieving A1c target with drug therapy & self-care practices    Subjective   I hope I get sent home with pain meds for my hip     Objective   Physical exam  General Normal weight male; ill-appearing. Conversant and cooperative  Neuro  Alert, oriented   Vital Signs   Visit Vitals  BP (!) 167/98 (BP 1 Location: Right upper arm, BP Patient Position: At rest)   Pulse 87   Temp 98.7 °F (37.1 °C)   Resp 16   Ht 5' 9\" (1.753 m)   Wt 70.8 kg (156 lb)   SpO2 100%   BMI 23.04 kg/m²   . Laboratory  Recent Labs     03/11/22  0312 03/10/22  0433 03/09/22  0701 03/08/22  2033 03/08/22  1423   * 60* 103*   < > 601*   AGAP 4* 8 12   < > 25*   WBC  --   --   --   --  10.9   CREA 1.63* 1.67* 2.09*   < > 1.94*   GFRNA 47* 46* 36*   < > 39*   AST  --   --   --   --  10*   ALT  --   --   --   --  16    < > = values in this interval not displayed.        Factors impacting BG management  Factor Dose Comments   Nutrition:  Standard meals     60 grams/meal      Pain Prn morphine    Infection Augmentin    Other:   Kidney function       GFR 47        Blood glucose pattern      Significant diabetes-related events over the past 24-72 hours  Transitioning off gluco-stabilzer this afternoon  3/10/2022 Hypoglycemic episode this morning  3/11/22 BG's stable today    Assessment and Plan   Nursing Diagnosis Risk for unstable blood glucose pattern   Nursing Intervention Domain 0774 Decision-making Support   Nursing Interventions Examined current inpatient diabetes/blood glucose control   Explored factors facilitating and impeding inpatient management  Explored corrective strategies with patient and responsible inpatient provider   Informed patient of rational for insulin strategy while hospitalized       Evaluation   This 46year old  male with Type 1 diabetes has not maintained BG control prior to admission as evidence of an A1c of 11.4%. The patient has multiple ER visits and admissions for hyperglycemia/DKA in the past year. The patient was last admitted one month ago. The patient expected to transition off gluco-stabilizer this afternoon on 15 units of Lantus. He is scheduled to receive his meal tray this morning. He still complains of nausea and has a hx of gastroparesis. The patient reports that his home dose is 30 units Lantus daily. On his last admission he did well on 35 units Lantus daily and meal time insulin. Diabetes management will continue to follow with this patient, monitoring BG trends and making recommendations as needed. 3/10/2022 Pt had a hypoglycemic episode this morning after receiving 45 units Lantus and correction insulin. The patient is now scheduled for 18 units Lantus to start this evening and 4 units meal time insulin. This is an appropriate dose for now. Diabetes management will continue to follow with this patient, monitoring BG's and making recommendation as needed.    3/11/22 BG's stable today on 18 units Lantus daily and Humalog 4 units with meals. The patient is tolerating meals. I suspect he will eventually require more insulin and  recommend that the patient continue this regimen at discharge and follow-up with his PCP for outpatient diabetes management. This patient would benefit from diabetes self-management education and [facility]. Recommendations     1. 18 units lantus daily  2. 4 units Humalog with meals   3. Monitor BG's  4. Follow-up with PCP           [x] Referral to   [facility] (Phone [phone] to schedule appointment) for [facility]      Billing Code(s)   [] 80713 IP subsequent hospital care - 35 minutes [] 53956 Prolonged Services - 65 minutes [] 25433 Prolonged Services - 110 minutes  [x] 05069 IP subsequent hospital care - 25 minutes [] 11658 Prolonged Services - 55 minutes [] 60348 Prolonged Services - 100 minutes  [] 14893 IP subsequent hospital care - 15 minutes [] 33752 Prolonged Services - 45 minutes [] 98931 Prolonged Services - 90 minutes    Before making these care recommendations, I personally reviewed the hospitalization record, including notes, laboratory & diagnostic data and current medications, and examined the patient at the bedside (circumstances permitting) before making care recommendations. More than fifty (50) percent of the time was spent in patient counseling and/or care coordination.   Total minutes: 25 minutes    ANEESH Scott  Diabetes Clinical Nurse Specialist  [facility]  Access via [employer]

## 2022-03-11 NOTE — PROGRESS NOTES
Transition of Care Plan:     RUR: 25% \"high risk\"  Disposition: Home with follow-up appointments  Follow up appointments: PCP  DME needed: None  Transportation at Discharge: Pt's mother will transport at d/c.   [address] or means to access home:   Pt has access      Medicare Letter: N/A  Is patient a BCPI-A Bundle: N/A                     If yes, was Bundle Letter given?: N/A   Is patient a [place] and connected with the [place]? No               If yes, was [employer] transfer form completed and [facility] notified? Caregiver Contact: Genoveva Dean. Mother [phone]  Discharge Caregiver contacted prior to discharge? CM reviewed chart. Discharge plan discussed during IDR. Pt not medically stable for d/c at this time; anticipate discharge home over the weekend. CM will continue to follow & address needs for discharge. Will report updates as they become available.     Gustabo Hernandez MSW  Care Management

## 2022-03-11 NOTE — PROGRESS NOTES
Hospitalist Progress Note    NAME: Srinivasan Starkey   :  1980   MRN:  [id number]       Assessment / Plan:  Diabetic ketoacidosis  Noncompliance  Metabolic acidosis  -s/p insulin drip with improvement of blood sugars  -blood sugars were low and insulin dose was decreased to lantus 18 units and lispro 4 units TID with SSI  -A1c is 11.4  -will monitor blood sugars for one more due to risk of hypoglycemia    [place] UTI  -S/P Ceftriaxone. Will start Augmentin for 4 more days to complete 7 day regimen. CKD stage IIIb  -cr is improving and is close to base lien of around 1.7       Urinary retention  BPH  -able to void now      Hypertension  Hyperlipidemia  Anxiety disorder  GERD  -Continue home coreg and crestor    18.5 - 24.9 Normal weight / Body mass index is 23.04 kg/m². Estimated discharge date:  to home if blood sugars are better  Barriers:    Code status: Full  Prophylaxis: Hep SQ  Recommended Disposition: Home w/Family     Subjective:     Chief Complaint / Reason for Physician Visit  Blood sugar borderline low this am. No N/V. Reports chronic lower abdominal pain     Review of Systems:  Symptom Y/N Comments  Symptom Y/N Comments   Fever/Chills    Chest Pain     Poor Appetite    Edema     Cough    Abdominal Pain     Sputum    Joint Pain     SOB/JOHNSTON    Pruritis/Rash     Nausea/vomit    Tolerating PT/OT     Diarrhea    Tolerating Diet     Constipation    Other       Could NOT obtain due to:      Objective:     VITALS:   Last 24hrs VS reviewed since prior progress note.  Most recent are:  Patient Vitals for the past 24 hrs:   Temp Pulse Resp BP SpO2   22 0855 98.7 °F (37.1 °C) 87 16 (!) 167/98 100 %   03/10/22 1501 98.3 °F (36.8 °C) 82 16 (!) 140/90 97 %       Intake/Output Summary (Last 24 hours) at 3/11/2022 1229  Last data filed at 3/10/2022 1324  Gross per 24 hour   Intake --   Output 100 ml   Net -100 ml        I had a face to face encounter and independently examined this patient on 3/11/2022, as outlined below:  PHYSICAL EXAM:  General: WD, WN. Alert, cooperative, no acute distress    EENT:  EOMI. Anicteric sclerae. MMM  Resp:  CTA bilaterally, no wheezing or rales. No accessory muscle use  CV:  Regular  rhythm,  No edema  GI:  Soft, Non distended, Non tender. +Bowel sounds  Neurologic:  Alert and oriented X 3, normal speech,   Psych:   Good insight. Not anxious nor agitated  Skin:  No rashes. No jaundice    Reviewed most current lab test results and cultures  YES  Reviewed most current radiology test results   YES  Review and summation of old records today    NO  Reviewed patient's current orders and MAR    YES  PMH/SH reviewed - no change compared to H&P  ________________________________________________________________________  Care Plan discussed with:    Comments   Patient x    Family      RN x    Care Manager     Consultant                       x Multidiciplinary team rounds were held today with , nursing, pharmacist and clinical coordinator. Patient's plan of care was discussed; medications were reviewed and discharge planning was addressed. ________________________________________________________________________  Total NON critical care TIME:  35  Minutes    Total CRITICAL CARE TIME Spent:   Minutes non procedure based      Comments   >50% of visit spent in counseling and coordination of care     ________________________________________________________________________  Mayra Wilson MD     Procedures: see electronic medical records for all procedures/Xrays and details which were not copied into this note but were reviewed prior to creation of Plan. LABS:  I reviewed today's most current labs and imaging studies.   Pertinent labs include:  Recent Labs     03/08/22  1423   WBC 10.9   HGB 11.9*   HCT 37.1   *     Recent Labs     03/11/22  0312 03/10/22  0433 03/09/22  0701 03/08/22  2033 03/08/22  1423   * 135* 136   < > 126*   K 3.7 2.9* 3.5   < > 4.2    102 103   < > 90*   CO2 25 25 21   < > 11*   * 60* 103*   < > 601*   BUN 20 25* 31*   < > 28*   CREA 1.63* 1.67* 2.09*   < > 1.94*   CA 7.5* 7.4* 8.7   < > 8.4*   MG 2.0 2.1 2.4   < >  --    ALB  --   --   --   --  1.7*   TBILI  --   --   --   --  0.5   ALT  --   --   --   --  16    < > = values in this interval not displayed.        Signed: Saskia Taylor MD

## 2022-03-12 VITALS
WEIGHT: 156 LBS | SYSTOLIC BLOOD PRESSURE: 148 MMHG | OXYGEN SATURATION: 100 % | DIASTOLIC BLOOD PRESSURE: 84 MMHG | HEART RATE: 88 BPM | TEMPERATURE: 98.1 F | HEIGHT: 69 IN | BODY MASS INDEX: 23.11 KG/M2 | RESPIRATION RATE: 16 BRPM

## 2022-03-12 LAB
ANION GAP SERPL CALC-SCNC: 3 MMOL/L (ref 5–15)
BUN SERPL-MCNC: 25 MG/DL (ref 6–20)
BUN/CREAT SERPL: 15 (ref 12–20)
CALCIUM SERPL-MCNC: 7.9 MG/DL (ref 8.5–10.1)
CHLORIDE SERPL-SCNC: 103 MMOL/L (ref 97–108)
CO2 SERPL-SCNC: 26 MMOL/L (ref 21–32)
CREAT SERPL-MCNC: 1.62 MG/DL (ref 0.7–1.3)
GLUCOSE BLD STRIP.AUTO-MCNC: 123 MG/DL (ref 65–117)
GLUCOSE BLD STRIP.AUTO-MCNC: 182 MG/DL (ref 65–117)
GLUCOSE BLD STRIP.AUTO-MCNC: 66 MG/DL (ref 65–117)
GLUCOSE BLD STRIP.AUTO-MCNC: 81 MG/DL (ref 65–117)
GLUCOSE BLD STRIP.AUTO-MCNC: 93 MG/DL (ref 65–117)
GLUCOSE SERPL-MCNC: 107 MG/DL (ref 65–100)
MAGNESIUM SERPL-MCNC: 2.2 MG/DL (ref 1.6–2.4)
POTASSIUM SERPL-SCNC: 4.2 MMOL/L (ref 3.5–5.1)
SERVICE CMNT-IMP: ABNORMAL
SERVICE CMNT-IMP: ABNORMAL
SERVICE CMNT-IMP: NORMAL
SODIUM SERPL-SCNC: 132 MMOL/L (ref 136–145)

## 2022-03-12 PROCEDURE — 74011000250 HC RX REV CODE- 250: Performed by: STUDENT IN AN ORGANIZED HEALTH CARE EDUCATION/TRAINING PROGRAM

## 2022-03-12 PROCEDURE — 82962 GLUCOSE BLOOD TEST: CPT

## 2022-03-12 PROCEDURE — 74011636637 HC RX REV CODE- 636/637: Performed by: INTERNAL MEDICINE

## 2022-03-12 PROCEDURE — 74011250637 HC RX REV CODE- 250/637: Performed by: INTERNAL MEDICINE

## 2022-03-12 PROCEDURE — 74011250637 HC RX REV CODE- 250/637: Performed by: STUDENT IN AN ORGANIZED HEALTH CARE EDUCATION/TRAINING PROGRAM

## 2022-03-12 PROCEDURE — 80048 BASIC METABOLIC PNL TOTAL CA: CPT

## 2022-03-12 PROCEDURE — 83735 ASSAY OF MAGNESIUM: CPT

## 2022-03-12 PROCEDURE — 97116 GAIT TRAINING THERAPY: CPT

## 2022-03-12 PROCEDURE — 97161 PT EVAL LOW COMPLEX 20 MIN: CPT

## 2022-03-12 PROCEDURE — 97535 SELF CARE MNGMENT TRAINING: CPT

## 2022-03-12 PROCEDURE — 74011250636 HC RX REV CODE- 250/636: Performed by: STUDENT IN AN ORGANIZED HEALTH CARE EDUCATION/TRAINING PROGRAM

## 2022-03-12 PROCEDURE — 36415 COLL VENOUS BLD VENIPUNCTURE: CPT

## 2022-03-12 RX ORDER — AMOXICILLIN AND CLAVULANATE POTASSIUM 875; 125 MG/1; MG/1
1 TABLET, FILM COATED ORAL EVERY 12 HOURS
Qty: 6 TABLET | Refills: 0 | Status: SHIPPED | OUTPATIENT
Start: 2022-03-12 | End: 2022-03-15

## 2022-03-12 RX ORDER — INSULIN ASPART 100 [IU]/ML
2 INJECTION, SOLUTION INTRAVENOUS; SUBCUTANEOUS
Qty: 10 ADJUSTABLE DOSE PRE-FILLED PEN SYRINGE | Refills: 3 | Status: SHIPPED | OUTPATIENT
Start: 2022-03-12 | End: 2022-06-21 | Stop reason: SDUPTHER

## 2022-03-12 RX ORDER — INSULIN GLARGINE 100 [IU]/ML
18 INJECTION, SOLUTION SUBCUTANEOUS
Qty: 5.4 ML | Refills: 0 | Status: SHIPPED | OUTPATIENT
Start: 2022-03-12 | End: 2022-04-11

## 2022-03-12 RX ORDER — INSULIN LISPRO 100 [IU]/ML
2 INJECTION, SOLUTION INTRAVENOUS; SUBCUTANEOUS
Status: DISCONTINUED | OUTPATIENT
Start: 2022-03-12 | End: 2022-03-12 | Stop reason: HOSPADM

## 2022-03-12 RX ADMIN — PANTOPRAZOLE SODIUM 40 MG: 40 TABLET, DELAYED RELEASE ORAL at 06:31

## 2022-03-12 RX ADMIN — AMOXICILLIN AND CLAVULANATE POTASSIUM 1 TABLET: 875; 125 TABLET, FILM COATED ORAL at 08:42

## 2022-03-12 RX ADMIN — SODIUM CHLORIDE, PRESERVATIVE FREE 10 ML: 5 INJECTION INTRAVENOUS at 06:35

## 2022-03-12 RX ADMIN — FINASTERIDE 5 MG: 5 TABLET, FILM COATED ORAL at 08:42

## 2022-03-12 RX ADMIN — DULOXETINE HYDROCHLORIDE 60 MG: 30 CAPSULE, DELAYED RELEASE ORAL at 08:42

## 2022-03-12 RX ADMIN — AMLODIPINE BESYLATE 10 MG: 5 TABLET ORAL at 08:42

## 2022-03-12 RX ADMIN — Medication 2 UNITS: at 13:06

## 2022-03-12 RX ADMIN — PREGABALIN 75 MG: 25 CAPSULE ORAL at 08:42

## 2022-03-12 RX ADMIN — Medication 4 UNITS: at 07:16

## 2022-03-12 RX ADMIN — MORPHINE SULFATE 1 MG: 2 INJECTION, SOLUTION INTRAMUSCULAR; INTRAVENOUS at 12:31

## 2022-03-12 RX ADMIN — CARVEDILOL 12.5 MG: 12.5 TABLET, FILM COATED ORAL at 08:42

## 2022-03-12 RX ADMIN — MORPHINE SULFATE 1 MG: 2 INJECTION, SOLUTION INTRAMUSCULAR; INTRAVENOUS at 06:34

## 2022-03-12 RX ADMIN — ENOXAPARIN SODIUM 40 MG: 100 INJECTION SUBCUTANEOUS at 08:42

## 2022-03-12 NOTE — PROGRESS NOTES
End of Shift Note    Bedside shift change report given to [address] [place] [address] (oncoming nurse) by Eve Camacho RN (offgoing nurse). Report included the following information SBAR, Kardex, Intake/Output, MAR and Recent Results    Shift worked:  1837-0734     Shift summary and any significant changes:    Pt was hypoglycemic at 0100. Given Apple Juice. Blood sugar rechecked. Patient is within normal limits. Concerns for physician to address:  No concerns     Zone phone for oncoming shift:   [phone]       Activity:  Activity Level: Up ad leeanne  Number times ambulated in hallways past shift: 0  Number of times OOB to chair past shift: 2    Cardiac:   Cardiac Monitoring: Yes      Cardiac Rhythm: Sinus Rhythm    Access:   Current line(s): PIV     Genitourinary:   Urinary status: voiding    Respiratory:   O2 Device: None (Room air)  Chronic home O2 use?: NO  Incentive spirometer at bedside: NO       GI:  Last Bowel Movement Date: 03/08/22  Current diet:  ADULT DIET Regular; 4 carb choices (60 gm/meal)  DIET ONE TIME MESSAGE  DIET ONE TIME MESSAGE  Passing flatus: YES  Tolerating current diet: YES       Pain Management:   Patient states pain is manageable on current regimen: YES    Skin:  Corey Score: 22  Interventions: increase time out of bed    Patient Safety:  Fall Score:  Total Score: 1  Interventions: gripper socks  High Fall Risk: Yes    Length of Stay:  Expected LOS: 3d 2h  Actual LOS: 4      Eve Camacho RN
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
## 2022-03-12 NOTE — PROGRESS NOTES
Transition of Care Plan:     RUR: 25% \"high risk\"  Disposition: Home with follow-up appointments  Follow up appointments: PCP  DME needed: None  Transportation at Discharge: Pt's mother will transport at d/c.   [place] or means to access home:   Pt has access      Medicare Letter: N/A  Is patient a BCPI-A Bundle: N/A                     If yes, was Bundle Letter given?: N/A   Is patient a  and connected with the [facility]  If yes, was [employer] transfer form completed and [facility] notified? Caregiver Contact: Khadijah Segura. Mother [phone]  Discharge Caregiver contacted prior to discharge? Discharge orders placed. Pt to discharge home with follow-up appointments. PT/OT consults completed, no recommendations for discharge. Pt's mother to provide transportation home. All information entered into pt AVS.     Pt has no additional CM needs at this time. Care Management Interventions  PCP Verified by CM: Yes  Palliative Care Criteria Met (RRAT>21 & CHF Dx)?: No  Mode of Transport at Discharge: Other (see comment) (mother)  Transition of Care Consult (CM Consult): Discharge Planning  Discharge Durable Medical Equipment: No  Health Maintenance Reviewed: Yes  Physical Therapy Consult: Yes  Occupational Therapy Consult: Yes  Speech Therapy Consult: No  Support Systems: Parent(s),Other Family Member(s)  Confirm Follow Up Transport: Family  The Plan for Transition of Care is Related to the Following Treatment Goals :  Follow-up Appointments  The Patient and/or Patient Representative was Provided with a Choice of Provider and Agrees with the Discharge Plan?: Yes  Name of the Patient Representative Who was Provided with a Choice of Provider and Agrees with the Discharge Plan: Raoul Hahn (pt)  [address] Provided?: No  Discharge Location  Patient Expects to be Discharged to[de-identified] Home    Deangelo Izquierdo 29 Manager  [phone]

## 2022-03-12 NOTE — PROGRESS NOTES
Pts Bilateral PIV removed  Telemetry box removed  Educated patient on discharge instructions f/u appointments, Medications to  from pharmacy pts mother and patient verbally expressed understanding. Pt wheeled to main entrance by tech  Pts belongings packed and prepared by mother, all belongings taken to car by patients mother  Pt discharged into mothers care.
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
## 2022-03-12 NOTE — PROGRESS NOTES
Orders received, chart reviewed and patient evaluated by physical therapy. Pending progression with skilled acute physical therapy, recommend:  No skilled physical therapy/ follow up rehabilitation needs identified at this time. Pt is functioning at his baseline independent level and is safe to ambulate hallways with supervision of RN. Full evaluation to follow.

## 2022-03-13 LAB
BACTERIA SPEC CULT: NORMAL
SERVICE CMNT-IMP: NORMAL

## 2022-03-17 ENCOUNTER — OFFICE VISIT (OUTPATIENT)
Dept: NEUROLOGY | Age: 42
End: 2022-03-17
Payer: COMMERCIAL

## 2022-03-17 DIAGNOSIS — E11.42 DIABETIC POLYNEUROPATHY ASSOCIATED WITH TYPE 2 DIABETES MELLITUS (HCC): ICD-10-CM

## 2022-03-17 DIAGNOSIS — E10.40 DIABETIC NEUROPATHY, TYPE I DIABETES MELLITUS (HCC): ICD-10-CM

## 2022-03-17 DIAGNOSIS — M54.16 LUMBAR RADICULOPATHY: ICD-10-CM

## 2022-03-17 PROCEDURE — 95910 NRV CNDJ TEST 7-8 STUDIES: CPT | Performed by: PSYCHIATRY & NEUROLOGY

## 2022-03-17 PROCEDURE — 95886 MUSC TEST DONE W/N TEST COMP: CPT | Performed by: PSYCHIATRY & NEUROLOGY

## 2022-03-17 PROCEDURE — 3046F HEMOGLOBIN A1C LEVEL >9.0%: CPT | Performed by: PSYCHIATRY & NEUROLOGY

## 2022-03-17 NOTE — PROGRESS NOTES
[address] [facility]  [address] [facility] [address]   Phone [phone] Fax [phone]  Test Date:  3/17/2022    Patient: Suzette Doll : 1980 Physician: Gabby Ramos MD   Sex: Male Height: 5' 9\" Ref Phys: Gabby Ramos MD   ID#: [id number]  Weight: 156 lbs. Technician: Felicity Rodriguez     Patient Complaints:      Patient History / Exam:  82-year-old with poorly controlled diabetes who is being evaluated for peripheral neuropathy and low back pain radiating down into the lower extremities. He continues to experience pain and paresthesias in both legs below the knees. NCV & EMG Findings:  Evaluation of the left peroneal motor nerve showed reduced amplitude (0.1 mV) and decreased conduction velocity (Poplt-B Fib, 16 m/s). The right peroneal motor nerve showed reduced amplitude (0.1 mV) and decreased conduction velocity (B Fib-Ankle, 35 m/s). The left tibial motor and the right tibial motor nerves showed reduced amplitude (L0.1, R0.2 mV). The left Sup Peroneal sensory and the right Sup Peroneal sensory nerves showed no response (14 cm). The left sural sensory and the right sural sensory nerves showed no response (Calf). All left vs. right side differences were within normal limits. F Wave studies indicate that the right tibial F wave has no response. Needle evaluation of the left anterior tibialis, the left gastroc, the right peroneus longus, the right gastroc, and the right flexor digitorum longus muscles showed slightly increased spontaneous activity, increased motor unit amplitude, and diminished recruitment. The left peroneus longus muscle showed increased motor unit amplitude. The left flexor digitorum longus muscle showed slightly increased spontaneous activity and diminished recruitment.   The left vastus lateralis muscle showed increased motor unit amplitude and moderately increased polyphasic potentials. The right anterior tibialis muscle showed slightly increased spontaneous activity and increased motor unit amplitude. The right vastus lateralis muscle showed slightly increased spontaneous activity, increased motor unit amplitude, and moderately increased polyphasic potentials. All remaining muscles (as indicated in the following table) showed no evidence of electrical instability. Impression:    The electrodiagnostic testing is suggestive of:  1. Severe, distal, axonal type of large fiber peripheral neuropathy affecting both motor and sensory fibers  2.   Acute on chronic neurogenic changes seen in L4, L5-S1 myotomes bilaterally along with lumbar paraspinals which may represent an underlying multilevel/polyradiculopathy     Recommendations:  MRI of the lumbar spine    ___________________________  Rogelio Rodriges MD        Nerve Conduction Studies  Anti Sensory Summary Table     Stim Site NR Peak (ms) Norm Peak (ms) P-T Amp (µV) Norm P-T Amp Onset (ms) Site1 Site2 Delta-P (ms) Dist (cm) Dane (m/s) Norm Dane (m/s)   Left Sup Peroneal Anti Sensory (Ant Lat Mall)  32.4°C   14 cm NR  <4.4  >5.0  14 cm Ant Lat Mall  14.0  >32   Right Sup Peroneal Anti Sensory (Ant Lat Mall)  33.2°C   14 cm NR  <4.4  >5.0  14 cm Ant Lat Mall  14.0  >32   Left Sural Anti Sensory (Lat Mall)  32.2°C   Calf NR  <4.0  >5.0  Calf Lat Mall  14.0  >35   Right Sural Anti Sensory (Lat Mall)  32.8°C   Calf NR  <4.0  >5.0  Calf Lat Mall  14.0  >35     Motor Summary Table     Stim Site NR Onset (ms) Norm Onset (ms) O-P Amp (mV) Norm O-P Amp Site1 Site2 Delta-0 (ms) Dist (cm) Dane (m/s) Norm Dane (m/s)   Left Peroneal Motor (Ext Dig Brev)  32.1°C   Ankle    5.4 <6.1 0.1 >2.5 B Fib Ankle 5.6 35.0 63 >38   B Fib    11.0  0.1  Poplt B Fib 6.3 10.0 16 >40   Poplt    17.3  0.1          Right Peroneal Motor (Ext Dig Brev)  32.6°C   Ankle    3.1 <6.1 0.1 >2.5 B Fib Ankle 10.3 36.0 35 >38   B Fib    13.4  0.1  Poplt B Fib 2.5 10.0 40 >40 Poplt    15.9  0.5          Left Tibial Motor (Abd Jose Brev)  32.3°C   Ankle    5.1 <6.1 0.1 >3.0 Knee Ankle 12.4 43.0 35 >35   Knee    17.5  0.1          Right Tibial Motor (Abd Jose Brev)  33°C   Ankle    5.6 <6.1 0.2 >3.0 Knee Ankle 10.5 43.0 41 >35   Knee    16.1  0.1            F Wave Studies     NR F-Lat (ms) Lat Norm (ms) L-R F-Lat (ms) L-R Lat Norm   Right Tibial (Mrkrs) (Abd Hallucis)  32.9°C   NR  <61  <5.7     EMG     Side Muscle Nerve Root Ins Act Fibs Psw Amp Dur Poly Recrt Int Pat Comment   Left AntTibialis Dp Br Peronel L4-5 Nml Nml 1+ Incr Nml 0 Reduced Nml    Left Peroneus Long Sup Br Peronel L5-S1 Nml Nml Nml Incr Nml 0 Nml Nml    Left Gastroc Tibial S1-2 Nml Nml 1+ Incr Nml 0 Reduced Nml    Left Flex Dig Long Tibial L5-S2 Nml Nml 1+ Nml Nml 0 Reduced Nml    Left VastusLat Femoral L2-4 Nml Nml Nml Incr Nml 2+ Nml Nml    Right AntTibialis Dp Br Peronel L4-5 Nml Nml 1+ Incr Nml 0 Nml Nml    Right Peroneus Long Sup Br Peronel L5-S1 Nml Nml 1+ Incr Nml 0 Reduced Nml    Right Gastroc Tibial S1-2 Nml Nml 1+ Incr Nml 0 Reduced Nml    Right Flex Dig Long Tibial L5-S2 Nml Nml 1+ Incr Nml 0 Reduced Nml    Right VastusLat Femoral L2-4 Nml Nml 1+ Incr Nml 2+ Nml Nml    Right Lumbo Parasp Low Rami L5-S1 Nml Nml Nml               Waveforms:

## 2022-03-18 PROBLEM — E87.20 ACIDOSIS, METABOLIC: Status: ACTIVE | Noted: 2020-03-07

## 2022-03-18 PROBLEM — N13.30 HYDRONEPHROSIS OF RIGHT KIDNEY: Status: ACTIVE | Noted: 2019-10-29

## 2022-03-18 PROBLEM — K92.0 COFFEE GROUND EMESIS: Status: ACTIVE | Noted: 2022-02-09

## 2022-03-18 PROBLEM — U07.1 COVID-19: Status: ACTIVE | Noted: 2022-02-09

## 2022-03-19 PROBLEM — N39.0 UTI (URINARY TRACT INFECTION): Status: ACTIVE | Noted: 2022-03-08

## 2022-03-19 PROBLEM — T68.XXXA HYPOTHERMIA: Status: ACTIVE | Noted: 2020-03-07

## 2022-03-19 PROBLEM — E10.10 DKA, TYPE 1, NOT AT GOAL (HCC): Status: ACTIVE | Noted: 2020-03-07

## 2022-03-19 PROBLEM — N17.9 AKI (ACUTE KIDNEY INJURY) (HCC): Status: ACTIVE | Noted: 2020-01-02

## 2022-03-19 PROBLEM — E10.10 DKA, TYPE 1 (HCC): Status: ACTIVE | Noted: 2018-11-18

## 2022-03-19 PROBLEM — E87.5 HYPERKALEMIA: Status: ACTIVE | Noted: 2021-09-23

## 2022-03-19 PROBLEM — G62.9 NEUROPATHY: Status: ACTIVE | Noted: 2018-11-18

## 2022-03-19 PROBLEM — E11.10 DKA (DIABETIC KETOACIDOSIS) (HCC): Status: ACTIVE | Noted: 2021-12-02

## 2022-03-19 PROBLEM — K92.2 UPPER GI BLEED: Status: ACTIVE | Noted: 2018-11-18

## 2022-03-21 ENCOUNTER — PATIENT OUTREACH (OUTPATIENT)
Dept: CASE MANAGEMENT | Age: 42
End: 2022-03-21

## 2022-03-21 ENCOUNTER — CLINICAL SUPPORT (OUTPATIENT)
Dept: DIABETES SERVICES | Age: 42
End: 2022-03-21
Payer: COMMERCIAL

## 2022-03-21 DIAGNOSIS — E10.40 DIABETIC NEUROPATHY, TYPE I DIABETES MELLITUS (HCC): Primary | ICD-10-CM

## 2022-03-21 PROCEDURE — G0108 DIAB MANAGE TRN  PER INDIV: HCPCS | Performed by: DIETITIAN, REGISTERED

## 2022-03-21 NOTE — PROGRESS NOTES
Care Transitions Initial Call    Call within 2 business days of discharge: Yes     Patient: Yas Aguilar Patient : 1980 MRN: [id number]    Last Discharge [address]       Complaint Diagnosis Description Type Department Provider    3/8/22 High Blood Sugar Diabetic ketoacidosis without coma associated with type 1 diabetes mellitus ([facility] [place] [address] . .. ED to Hosp-Admission (Discharged) (ADMIT) Reyna Kingston MD; Pastora Flynn. .. Was this an external facility discharge? No Discharge Facility: [facility]    Challenges to be reviewed by the provider   Additional needs identified to be addressed with provider: yes  wound on leg with swelling and weeping. ctn recommended urgent care and to call transportation number on the back of medicaid card         Method of communication with provider : chart routing    Discussed COVID-19 related testing which was not done at this time. Test results were not done. Patient informed of results, if available? Not done on readmission     Advance Care Planning:   Inpatient Readmission Risk score: Unplanned Readmit Risk Score: 24.4 ( )    Was this a readmission? yes   Patient stated reason for the admission: dka    Patients top risk factors for readmission: financial, functional physical ability, lack of knowledge about disease, level of motivation, medication management, polypharmacy, support system, transportation and utilization of services   Interventions to address risk factors: Obtained and reviewed discharge summary and/or continuity of care documents    Care Transition Nurse (CTN) contacted the patient by telephone to perform post hospital discharge assessment. Verified name and  with patient as identifiers. Provided introduction to self, and explanation of the CTN role. CTN reviewed discharge instructions, medical action plan and red flags with patient who verbalized understanding. Were discharge instructions available to patient? yes.  Reviewed appropriate site of care based on symptoms and resources available to patient including: PCP, Specialist and Urgent Care Clinics. Patient given an opportunity to ask questions and does not have any further questions or concerns at this time. The patient agrees to contact the PCP office for questions related to their healthcare. Medication reconciliation was performed with patient, who verbalizes understanding of administration of home medications. Advised obtaining a 90-day supply of all daily and as-needed medications. Referral to Pharm D needed: no     Home Health/Outpatient orders at discharge: dietician services       Durable Medical Equipment ordered at discharge: None    Was patient discharged with a pulse oximeter? no.       Discussed follow-up appointments. If no appointment was previously scheduled, appointment scheduling offered: yes. Is follow up appointment scheduled within 7 days of discharge? no.   [facility] follow up appointment(s):   Future Appointments   Date Time Provider Caryl Foley   3/24/2022  5:15 PM [facility]   3/30/2022 10:00 AM Inell Dakins, MD [facility] BS AMB   4/6/2022  2:00 PM Sharron Walker MD [facility] BS AMB   6/21/2022 11:30 AM Ky Kingsley MD [facility] [place] BS AMB     [facility] follow up appointment(s): none at this time. Instructed patient to go to urgent care for wound to leg, concern for cellulitis with diabetes. Ctn informed patient of transportation number on back of medicaid card to get a ride. Plan for follow-up call in 3-5 days based on severity of symptoms and risk factors. Plan for next call: self management-wound care to leg  CTN provided contact information for future needs. Goals Addressed                 This Visit's Progress     Prevent complications post hospitalization.    Not on track     2/15/22     Patient to check blood sugars 3 x a day   Patient to check bp daily   Patient to wash hansen with soap and water daily   Patient to attend pcp appt on 2/16 virtual and [facility] appt [facility]rology at [facility] in one week.  Patient to complete antibiotic  and take all other meds as prescribed and listed in dc paperwork.  Ctn to follow up in one week. Lacie Brandon RN      2/22/22   Patient to check blood sugars 3 x a day   Patient to check bp daily   Patient to wash hansen with soap and water daily   Patient to attend pcp appt on 2/23 virtual and ctn called to make appt with urology at [facility] this week.  Patient to complete antibiotic  and take all other meds as prescribed and listed in dc paperwork.  Ctn to follow up in  one week.  Asif Mendoza RN      3/21/22   Patient to go to urgent care due to open wound on leg and swelling.  Patient to check blood sugars 3 x a day   Patient to check bp daily   Ctn to follow up in 3 to 5 days.    Lacie Brandon RN

## 2022-03-22 NOTE — PROGRESS NOTES
[address] [place] [address] Diabetes Health  Diabetes Self-Management Education & Support Program  Pre-program Assessment    Reason for Referral: diabetes education  Referral Source: Kasie Mccarthy MD  Services requested: DSMES    ASSESSMENT    From my perspective, the participant would benefit from [facility] specifically related to Reducing risks, Healthy eating, Monitoring, Physical activity, Taking medications, Healthy coping and Problem solving. Will adapt DSMES program to build on participant's skills score, confidence score and preparedness score as noted in the Diabetes Skills, Confidence, and Preparedness Index. During the program, we will focus on providing DSMES that specifically addresses participant's interest in Reducing risks, Healthy eating, Monitoring, Physical activity, Taking medications, Healthy coping and Problem solving, as shown by their reported readiness to change. The participant would be best served by attending weekly group class series. Diabetes Self-Management Education Follow-up Visit: 4/6/2022- patient currently with an abrasion on lower left leg, discussed with patient's care coordinator, along with patient, advised to go to Urgent care today as [facility] does not accept his insurance. Clinical Presentation  Claryce Goltz is a 46 y.o. White male referred for diabetes self-management education. Participant has Type 1 DM for 11-20 years. Family history unknown for diabetes.  Patient reports not receiving DSMES services in the past. Most recent A1c value:   Lab Results   Component Value Date/Time    Hemoglobin A1c 11.4 (H) 03/08/2022 08:33 PM    Hemoglobin A1c (POC) 13.2 04/01/2021 09:25 AM       Diabetes-related medical history:  Acute complications  DKA  Neurological complications  Peripheral neuropathy  Microvascular disease  Retinopathy and Nephropathy    Diabetes-related medications:  Current dosing:   Key Antihyperglycemic Medications             insulin aspart U-100 (NovoLOG Flexpen U-100 Insulin) 100 unit/mL (3 mL) inpn 1 unit of insulin for ever 15 grams of CHO eaten. - patient denies taking correctional insulin    insulin glargine (Lantus Solostar U-100 Insulin) 100 unit/mL (3 mL) inpn 18 Units by SubCUTAneous route nightly for 30 days. INJECT 30 UNITS SUBCUTANEOUSLY DAILY          Blood Pressure Management  Key ACE/ARB Medications     Patient is on no ACE or ARB meds. Lipid Management  Key Antihyperlipidemia Meds             rosuvastatin (CRESTOR) 40 mg tablet Take 1 Tablet by mouth nightly. Clot Prevention  Key Anti-Platelet Anticoagulant Meds     The patient is on no antiplatelet meds or anticoagulants. Learning Assessment  Learning objectives Educator assessment (3/22/2022)   Diabetes Disease Process  The participant can   A) describe diabetes in basic terms;   B) state the type of diabetes they have; &   C) state accepted blood glucose targets. Healthy Eating  The participant can   A) identify carbohydrate foods; &   B) accurately read food labels. Being Active  The participant can  A) state the benefits of physical activity;  B) report their current PA practices;  C) identify PA they would consider incorporating in their lives; &  D) develop an implementation plan. Monitoring  The participant can  A) operate their blood glucose meter; &  B) describe how they log their blood glucoses to share with their provider. Taking Medications  The participant can  A) name their diabetes medications;  B) state the purpose and dose;  C) note side effects; &  D) describe proper storage, disposal & transport (if appropriate). Healthy Coping  The participant can    A) describe their response to diabetes diagnosis; B) describe their specific coping mechanisms;  C) identify supportive people and/or other resources that positively support their diabetes self-care and health.     Reducing Risks  The participant can describe the preventive measures used by providers to promote health and prevent diabetes complications. Problem Solving  The participant can   A) identify signs, symptoms & treatment of hypoglycemia;   B) identify signs, symptoms & treatment of hyperglycemia;  C) describe their sick day plan; &  D) identify BG patterns to discuss with their provider.        Yes  Yes  Yes        No  No        Yes  Yes  Yes  Yes        Yes  Yes          Yes  Yes  Yes  Yes      Yes  Yes  YES        No          Yes  Yes  Yes  No     Characteristics to Learning   Barriers to Learning   [] Cognitive loss  [] Mental retardation   [] Intellectual delay/cognitive impairment  [] Psychiatric disorder  [] Visually impaired  [] Hearing loss                 [] Low literacy (difficulty with written text)  [] Low numeracy (difficulty with mathematical information  [] Low health literacy (difficulty with understanding health information & services  [] Language  [] Functional limitation  [x] Pain   [] Financial  [] Transportation  [] None   Favorite Ways to Learn   [] Lecture  [] Slides  [] Reading [] Video-Internet  [] Cassettes/CDs/MP3's  [x] Interactive Small Groups [] Other       Behavioral Assessment  Current self-care practices  Educator assessment (3/22/2022)   Healthy Eating  Current practices  24-hour Dietary Recall:  Breakfast: 2 eggs and cheese sandwich, coffee with milk and 2 equal sweetener, water,   Lunch: meal provided by insurance  Dinner: BBQ chicken, green beans, potatoes, water  Snacks: none  Beverages: water  Alcohol: none     Would benefit from DSMES related to Healthy Eating: Yes      Eats a carbohydrate controlled diet: No      Stage of change: Preparation   Being Active  Current practices  How many days during the past week have you performed physical activity where your heart beats faster and your breathing is harder than normal for 30 minutes or more?  0 days    How many days in a typical week do you perform activity such as this?  0 days     Would benefit from [facility] related to Being Active: Yes      Exercises 150 minutes/week: No      Stage of change: Preparation     Monitoring  Current practices  Do you monitor your blood sugar? Yes    How often do you monitor? CGM    What are the range of readings? 291mg/dL currently in office    Do you know your last A1c measurement? Yes    Do you know the meaning of the A1c? Yes     Would benefit from [facility] related to Monitoring: Yes      Uses BG readings to establish trends and understand BG patterns: No      Stage of change: Action   Taking Medication  Current practices  Do you understand what your diabetes medications do? Yes    How often do you miss doses of your diabetes medications? has missed his bolus insulin once in the past 2 days    Can you afford your diabetes medications? Yes   Would benefit from [facility] related to Taking Medication: Yes      Takes medications consistently to receive full benefit: No      Stage of change: Preparation       Healthy Coping   Current state  Diabetes Skills, Confidence and Preparedness Index: Total score: 4.9  Skills: 5.0  Confidence: 4.3  Preparedness: 5.3   Would benefit from DSMES related to Healthy Coping: Yes      Identifies specific people, organizations,etc, that actively support their diabetes self-care efforts: Yes      Stage of change: Action     Reducing Risks  Current state  Vaccines:  Influenza: There is no immunization history for the selected administration types on file for this patient. Pneumococcal:   Immunization History   Administered Date(s) Administered    (RETIRED) Pneumococcal Vaccine (Unspecified Type) 08/18/2011    Pneumococcal Polysaccharide (PPSV-23) 08/18/2011        Hepatitis: There is no immunization history for the selected administration types on file for this patient.     Examinations:  Diabetic Foot and Eye Exam HM Status   Topic Date Due         Eye Exam  06/18/2022        Dental exam: DUE    Foot exam: Last appointment was: 3/22    Heart Protection:  BP Readings from Last 2 Encounters:   03/12/22 (!) 148/84   03/03/22 (!) 145/85        Lab Results   Component Value Date/Time    LDL, calculated 89.4 11/15/2021 12:01 PM        Kidney Protection:  Lab Results   Component Value Date/Time    Microalbumin/Creat ratio (mg/g creat) 11,439 (H) 04/01/2021 10:00 AM    Microalbumin,urine random 151.00 04/01/2021 10:00 AM        Would benefit from DSMES related to Reducing Risks: Yes      Actively participates in decision-making with provider regarding secondary prevention:  Yes      Stage of change: Preparation   Problem Solving  Current state  Hypoglycemia Management:  What are signs and symptoms of hypoglycemia that you experience? Dizziness/light-headedness, Sweat/clammy skin    How do you prevent hypoglycemia? Consistent meals/snack times    How do you treat hypoglycemia? Rule of 15    Hyperglycemia Management:  What are signs and symptoms of hyperglycemia that you experience? Extreme thirst, Dry skin    How can you prevent hyperglycemia? Take medication as instructed    Sick Day Management:  What do you do differently on sick days? Stay hydrated, Take diabetes medication as instructed by provider, Check urine for ketones if on insulin and experiencing s/s    Pattern Management:  Do you notice blood glucose patterns when you look at the readings in your meter or logbook? No    How do you use the blood glucose readings from your meter or logbook?  Pt reported being unaware of pattern management skills     Would benefit from [facility] related to Problem Solving: Yes      Articulates appropriate strategies to address hypoglycemia, hyperglycemia, sick day care and BG pattern: No      Stage of change: Action     Note: Content derived from the American Association of Diabetes Educators' Diabetes Education Curriculum: A Guide to Successful Self-Management (3rd edition)      Janine Ruiz RD on 3/22/2022 at 10:06 AM    I have personally reviewed the health record, including provider notes, laboratory data and current medications before making these care and education recommendations. The time spent in this effort is included in the total time. Total minutes: 54      [facility] Logo  Diabetes Skills, Confidence & Preparedness Index (SCPI) ©  Thank you for completing the Skills, Confidence & Preparedness Index! Below are your scores. All scales and questions are out of 7. If you would like these results emailed, please enter your email address along with some identifying patient information. Email:    Patient Identifier:        Overall SCPI score: 4.9 Skills Score: 5.0  Low: Healthy Eating(Q1),Blood Sugar Monitoring(Q8),Reducing Risks(Q9) Confidence Score: 4.3  Low: Healthy Eating(Q1),Healthy Coping(Q2),Blood Sugar Monitoring(Q6) Preparedness Score: 5.3  Low: Taking Medication(Q5)  Healthy Eating Score: 3.8  Low: Skills(Q1),Confidence(Q1) Taking Medication Score: 4.0  Low: Preparedness(Q5) Blood Sugar Monitoring Score: 4.4  Low: Skills(Q8),Confidence(Q6) Reducing Risks Score: 5.3  Low: UPNAYV(K5)  Problem Solving Score: 6.3  Low: Confidence(Q7),Preparedness(Q7) Healthy Coping Score: 4.7  Low: Confidence(Q2) Being Active Score: 6.5  Low: Preparedness(Q2)    Skills/Knowledge Questions  1. I know how to plan meals that have the best balance between carbohydrates, proteins and vegetables. 1  2. I know how my diabetes medications (pills, injectables and/or insulin) work in my body. 7  3. I know when to check my blood sugar if I want to see how my body responded to a meal. 7  4. I know when to check my blood sugars to determine if my medication or insulin doses are correct. 7  5. I know what to do to prevent a low blood sugar when I exercise (either before, during, or after). 7  6. When I am sick, I know what to do differently with my diabetes management. 7  7. I know how stress can affect my diabetes management. 7  8.  When I look at my blood sugars over a given week, I can explain what my blood sugar pattern is. 1  9. I know what my target levels are for A1c, blood pressure and cholesterol. 1  Confidence Questions  1. I am confident that I can plan balanced meals and snacks. 1  2. I am confident that I can manage my stress. 1  3. I am confident that I can prevent a low blood sugar during or after exercise. 7  4. I am confident that the next time I eat out, I will be able to choose foods that best keep my blood sugars in target. 7  5. I am confident I can include exercise into my schedule. 7  6. I am confident that I can use my daily blood sugars to adjust my diet, my activity, and/or my insulin. 1  7. When something out of my normal routine happens, I am confident that I can problem-solve and keep my diabetes on track. 6  Preparedness Questions  1. Within the next month, I will begin to eat more balanced meals and snacks. 6  2. Within the next month, I will choose an exercise activity and I will start fitting it into my schedule. 6  3. Within the next month, I will make a list of stress management options that work for me. 6  4. Within the next month, I will consistently plan ahead to prevent low blood sugars. 6  5. Within the next month, I will start adjusting my insulin doses on my own. 1  6. Within the next month, I will begin making changes to my diabetes management based on my daily blood sugars (eg - eating, activity and/or insulin). 6  7. Within the next month, I will begin making changes to my diabetes management to meet my overall goals (eg - eating, activity and/or insulin).  6

## 2022-03-24 DIAGNOSIS — M25.551 RIGHT HIP PAIN: ICD-10-CM

## 2022-03-25 NOTE — DISCHARGE INSTRUCTIONS
Patient Discharge Instructions    Srinivasan Starkey / [id number] : 1980    Admitted 3/8/2022 Discharged: 3/12/2022         DISCHARGE DIAGNOSIS:   Diabetic ketoacidosis  Noncompliance  Metabolic acidosis  [place] UTI  CKD stage IIIb  Urinary retention  BPH  Hypertension  Hyperlipidemia  Anxiety disorder  GERD       Take Home Medications     {Medication reconciliation information is now added to the patient's AVS automatically when it is printed. There is no need to use this SmartLink in discharge instructions. Highlight this text and delete it to clear this message}      General drug facts     If you have a very bad allergy, wear an allergy ID at all times. It is important that you take the medication exactly as they are prescribed. Keep your medication in the bottles provided by the pharmacist.  Keep a list of all your drugs (prescription, natural products, vitamins, OTC) with you. Give this list to your doctor. Do not take other medications without consulting your doctor. Do not share your drugs with others and do not take anyone else's drugs. Keep all drugs out of the reach of children and pets. Most drugs may be thrown away in household trash after mixing with coffee grounds or kyle litter and sealing in a plastic bag. Keep a list Call your doctor for help with any side effects. If in the [place] you may also call the FDA at 2-242-JCD-4066    Talk with the doctor before starting any new drug, including OTC, natural products, or vitamins. What to do at Home    1. Recommended diet: diabetic    2. Recommended activity: Activity as tolerated    3. If you experience any of the following symptoms then please call your primary care physician or return to the emergency room if you cannot get hold of your doctor:    4. Wound Care:  None     5. Lab work: check blood sugar 4 times per day     6. Call your PCP to increase insulin dose if blood sugars are going up     7.  Continue to wear oxygen and use BIPAP as previously     8. Bring these papers with you to your follow up appointments. The papers will help your doctors be sure to continue the care plan from the hospital.      If you have questions regarding the hospital related prescriptions or hospital related issues please call [facility] at [phone]. You can always direct your questions to your primary care doctor if you are unable to reach your hospital physician; your PCP works as an extension of your hospital doctor just like your hospital doctor is an extension of your PCP for your time at the hospital [facility], [facility])      Follow-up with:   PCP: Jose L Victor MD  Follow-up Information     Follow up With Specialties Details Why Contact Anabel Lau MD Internal Medicine   [address] [address] [address] [facility]      Jose L Victor MD Internal Medicine Schedule an appointment as soon as possible for a visit in 1 week  [address] [facility]  [address]  [phone]             Please call for your own appointment        Information obtained by :  I understand that if any problems occur once I am at home I am to contact my physician. I understand and acknowledge receipt of the instructions indicated above.                                                                                                                                            Physician's or R.N.'s Signature                                                                  Date/Time                                                                                                                                              Patient or Representative Signature                                                          Date/Time

## 2022-03-25 NOTE — DISCHARGE SUMMARY
Hospitalist Discharge Summary     Patient ID:  Andrez Walsh  [id number]  89 y.o.  1980  3/8/2022    PCP on record: Luigi Stone MD    Admit date: 3/8/2022  Discharge date and time: 3/12/2022    DISCHARGE DIAGNOSIS:    Diabetic ketoacidosis  Noncompliance  Metabolic acidosis  [place] UTI  CKD stage IIIb  Urinary retention  BPH  Hypertension  Hyperlipidemia  Anxiety disorder  GERD     CONSULTATIONS:  None    Excerpted HPI from H&P of Elvira Vivas MD:  Jane Miller is a 46 y.o.  male who presents with nausea vomiting with abdominal pain for the last 2 weeks. Patient past medical history of diabetes type 1, hypertension, hyperlipidemia, anxiety disorder, GERD. Patient stated for the last 2 weeks he is having nausea and vomiting along with abdominal pain in the epigastric area. Patient states that right now he is having some blood in the vomiting. Denies any fever and chills, no chest pain or shortness of breath, no headache, no dizziness, no other complaints.    ______________________________________________________________________  DISCHARGE SUMMARY/HOSPITAL COURSE:  for full details see H&P, daily progress notes, labs, consult notes. Hospital course problem wise:    Diabetic ketoacidosis  Noncompliance  Metabolic acidosis  -Patient was admitted to hospital and underwent management as follows.  -s/p insulin drip with improvement of blood sugars  -blood sugars were low and insulin dose was decreased to lantus 18 units and lispro 4 units TID with SSI  -A1c is 11.4  -will monitor blood sugars for one more due to risk of hypoglycemia  -He was discharged from the hospital on insulin regimen as below and was advised to check blood sugar 4 times daily for adjustment of insulin dosing.     [place] UTI  -S/P Ceftriaxone.  Will start Augmentin for 4 more days to complete 7 day regimen.      CKD stage IIIb  -cr is improving and is close to base lien of around 1.7       Urinary retention  BPH  -able to void now      Hypertension  Hyperlipidemia  Anxiety disorder  GERD  -Continue home coreg and crestor     Patient seen and examined today, vitals are stable, lab work is at baseline and is being released in much improved condition for outpatient follow-up. He was advised to follow with his primary care physician in about 1 week's time. _______________________________________________________________________  Patient seen and examined by me on discharge day. Pertinent Findings:  Gen:    Not in distress  Chest: Clear lungs  CVS:   Regular rhythm. No edema  Abd:  Soft, not distended, not tender  Neuro:  Alert, awake  _______________________________________________________________________  DISCHARGE MEDICATIONS:   Discharge Medication List as of 3/12/2022  2:41 PM      START taking these medications    Details   amoxicillin-clavulanate (AUGMENTIN) 875-125 mg per tablet Take 1 Tablet by mouth every twelve (12) hours for 6 doses. , Normal, Disp-6 Tablet, R-0         CONTINUE these medications which have CHANGED    Details   insulin aspart U-100 (NovoLOG Flexpen U-100 Insulin) 100 unit/mL (3 mL) inpn 2 Units by SubCUTAneous route Before breakfast, lunch, and dinner., Normal, Disp-10 Adjustable Dose Pre-filled Pen Syringe, R-3      insulin glargine (Lantus Solostar U-100 Insulin) 100 unit/mL (3 mL) inpn 18 Units by SubCUTAneous route nightly for 30 days. INJECT 30 UNITS SUBCUTANEOUSLY DAILY, Normal, Disp-5.4 mL, R-0         CONTINUE these medications which have NOT CHANGED    Details   metoclopramide HCl (Reglan) 10 mg tablet Take 1 Tablet by mouth every six (6) hours as needed for Nausea for up to 10 days. , Normal, Disp-12 Tablet, R-0      !! ondansetron (ZOFRAN ODT) 8 mg disintegrating tablet Take 1 Tablet by mouth every eight (8) hours as needed for Nausea or Vomiting., Normal, Disp-12 Tablet, R-0      oxyCODONE IR (Roxicodone) 5 mg immediate release tablet Take 1 Tablet by mouth every eight (8) hours as needed for Pain for up to 30 days. Max Daily Amount: 15 mg., Normal, Disp-20 Tablet, R-0      naloxone (Narcan) 4 mg/actuation nasal spray Use 1 spray intranasally, then discard. Repeat with new spray every 2 min as needed for opioid overdose symptoms, alternating nostrils. , Normal, Disp-1 Each, R-0      pen needle, diabetic 30 gauge x 3/16\" ndle 5 Units by Does Not Apply route Before breakfast, lunch, and dinner., Normal, Disp-100 Each, R-3      amLODIPine (NORVASC) 5 mg tablet Take 1 Tablet by mouth daily. , Normal, Disp-30 Tablet, R-1      carvediloL (COREG) 12.5 mg tablet Take 1 Tablet by mouth two (2) times daily (with meals). , Normal, Disp-60 Tablet, R-1      finasteride (PROSCAR) 5 mg tablet Take 1 Tablet by mouth daily. , Normal, Disp-30 Tablet, R-2      rosuvastatin (CRESTOR) 40 mg tablet Take 1 Tablet by mouth nightly., Normal, Disp-30 Tablet, R-0      !! ondansetron (ZOFRAN ODT) 4 mg disintegrating tablet Take 1 Tablet by mouth every eight (8) hours as needed for Nausea or Vomiting., Normal, Disp-20 Tablet, R-0      DULoxetine (CYMBALTA) 60 mg capsule Take 1 Capsule by mouth daily. , Normal, Disp-30 Capsule, R-3      pantoprazole (Protonix) 40 mg tablet Take 1 Tablet by mouth daily. , Normal, Disp-30 Tablet, R-0      ergocalciferol (ERGOCALCIFEROL) 1,250 mcg (50,000 unit) capsule Take 1 capsule by mouth once a week, Normal, Disp-12 Capsule, R-0      pregabalin (Lyrica) 75 mg capsule Take 75 mg by mouth two (2) times a day., Historical Med       !! - Potential duplicate medications found. Please discuss with provider. Patient Follow Up Instructions:     1. Recommended diet: diabetic    2. Recommended activity: Activity as tolerated    3. If you experience any of the following symptoms then please call your primary care physician or return to the emergency room if you cannot get hold of your doctor:    4. Wound Care:  None     5.  Lab work: check blood sugar 4 times per day 6. Call your PCP to increase insulin dose if blood sugars are going up     7. Continue to wear oxygen and use BIPAP as previously     8. Bring these papers with you to your follow up appointments.  The papers will help your doctors be sure to continue the care plan from the hospital.        Follow-up Information     Follow up With Specialties Details Why Contact Jef Delcid MD Internal Medicine   [address] [facility] [address]       Rachael Taylor MD Internal Medicine Schedule an appointment as soon as possible for a visit in 1 week  [address] [facility]  [address]  [phone]          ________________________________________________________________    Risk of deterioration: High    Condition at Discharge:  Stable  __________________________________________________________________    Disposition  Home with family, no needs    ____________________________________________________________________    Code Status: Full Code  ___________________________________________________________________      Total time in minutes spent coordinating this discharge (includes going over instructions, follow-up, prescriptions, and preparing report for sign off to her PCP) :  35  minutes    Signed:  Liam Eastmna MD

## 2022-03-29 ENCOUNTER — PATIENT OUTREACH (OUTPATIENT)
Dept: CASE MANAGEMENT | Age: 42
End: 2022-03-29

## 2022-03-30 NOTE — PROGRESS NOTES
Care Transitions Outreach Attempt    Call within 2 business days of discharge: Yes   Attempted to reach patient for transitions of care follow up. Unable to reach patient. Patient: Carla Lance Patient : 1980 MRN: [id number]    Last Discharge [facility] Facility       Complaint Diagnosis Description Type Department Provider    3/8/22 High Blood Sugar Diabetic ketoacidosis without coma associated with type 1 diabetes mellitus ([address] . .. ED to Hosp-Admission (Discharged) (ADMIT) Peter Rebolledo MD; Gauri Nguyen .. Was this an external facility discharge? No Discharge Facility:       Noted following upcoming appointments from discharge chart review:   [facility] follow up appointment(s):   Future Appointments   Date Time Provider Caryl Foley   2022  2:00 PM Reggie Gardiner MD [facility] BS AMB   2022  3:30 PM DIABETES GROUP CLASS [facility] BS AMB   2022  3:30 PM DIABETES GROUP CLASS [facility] BS AMB   2022  3:30 PM DIABETES GROUP CLASS [facility] BS AMB   2022  3:30 PM [facility]   2022 11:30 AM Raul Benavides MD [facility] [place] BS AMB     Non-BS follow up appointment(s): none at this time.

## 2022-04-07 PROBLEM — N39.0 UTI (URINARY TRACT INFECTION): Status: RESOLVED | Noted: 2022-03-08 | Resolved: 2022-01-01

## 2022-04-12 ENCOUNTER — PATIENT OUTREACH (OUTPATIENT)
Dept: CASE MANAGEMENT | Age: 42
End: 2022-04-12

## 2022-04-13 NOTE — PROGRESS NOTES
Care Transitions Outreach Attempt    Call within 2 business days of discharge: Yes   Attempted to reach patient for transitions of care follow up. Unable to reach patient. Patient: Vicki Aguilar Patient : 1980 MRN: [id number]    Last Discharge [facility] Facility       Complaint Diagnosis Description Type Department Provider    3/8/22 High Blood Sugar Diabetic ketoacidosis without coma associated with type 1 diabetes mellitus ([address] . .. ED to Hosp-Admission (Discharged) (ADMIT) Jefferson Harvey MD; Oral Mata. .. Was this an external facility discharge? No Discharge Facility:       Noted following upcoming appointments from discharge chart review:   [facility] follow up appointment(s):   Future Appointments   Date Time Provider Caryl Foley   2022  3:30 PM DIABETES GROUP CLASS [facility] BS AMB   2022  3:30 PM DIABETES GROUP CLASS [facility] BS AMB   2022 11:30 AM Eduardo Hunter [facility]     Non-[facility] follow up appointment(s): none known at this time.

## 2022-04-14 ENCOUNTER — PATIENT OUTREACH (OUTPATIENT)
Dept: CASE MANAGEMENT | Age: 42
End: 2022-04-14

## 2022-04-14 NOTE — PROGRESS NOTES
Care Transitions Follow Up Call    Challenges to be reviewed by the provider   Additional needs identified to be addressed with provider: no  none           Method of communication with provider : none    Care Transition Nurse (CTN) contacted the patient by telephone to follow up after admission on 3/31/22. Verified name and  with patient as identifiers. Addressed changes since last contact: making appt for eye injection at [facility] clinic  Follow up appointment completed? yes. Was follow up appointment scheduled within 7 days of discharge? yes. CTN reviewed discharge instructions, medical action plan and red flags with patient and discussed any barriers to care and/or understanding of plan of care after discharge. Discussed appropriate site of care based on symptoms and resources available to patient including: Specialist. The patient agrees to contact the PCP office for questions related to their healthcare. Patients top risk factors for readmission: functional physical ability, lack of knowledge about disease, level of motivation, polypharmacy, support system and transportation   Interventions to address risk factors: Obtained and reviewed discharge summary and/or continuity of care documents    [facility] follow up appointment(s):   Future Appointments   Date Time Provider Caryl Foley   2022  3:30 PM DIABETES GROUP CLASS [facility] BS AMB   2022  3:30 PM DIABETES GROUP CLASS [facility] BS AMB   2022 11:30 AM Justina Aguilar MD [facility] BS AMB     [facility] follow up appointment(s): [facility] for [facility] exam    CTN provided contact information for future needs. Plan for follow-up call in 5-7 days based on severity of symptoms and risk factors. Plan for next call: follow up appointment-eye appt and bs checks, wound on leg     Goals Addressed                 This Visit's Progress     Prevent complications post hospitalization.    On track     2/15/22     Patient to check blood sugars 3 x a day   Patient to check bp daily   Patient to wash hansen with soap and water daily   Patient to attend pcp appt on 2/16 virtual and make appt with urology at u in one week.  Patient to complete antibiotic  and take all other meds as prescribed and listed in dc paperwork.  Ctn to follow up in one week. Lacie Brandon RN      2/22/22   Patient to check blood sugars 3 x a day   Patient to check bp daily   Patient to wash hansen with soap and water daily   Patient to attend pcp appt on 2/23 virtual and ctn called to make appt with urology at [facility] this week.  Patient to complete antibiotic  and take all other meds as prescribed and listed in dc paperwork.  Ctn to follow up in  one week.  Maya Martinez RN      3/21/22   Patient to go to urgent care due to open wound on leg and swelling.  Patient to check blood sugars 3 x a day   Patient to check bp daily   Ctn to follow up in 3 to 5 days. Lacie Brandon RN    4/14/22     Patient given number to [address] clinic to get eye injections   Patient to check bs 3 to 4 times a day.  Ctn to follow up in one week.    Lacie Brandon RN

## 2022-04-20 RX ORDER — OXYCODONE HYDROCHLORIDE 5 MG/1
5 TABLET ORAL
Qty: 20 TABLET | Refills: 0 | OUTPATIENT
Start: 2022-04-20 | End: 2022-05-20

## 2022-04-27 ENCOUNTER — PATIENT OUTREACH (OUTPATIENT)
Dept: CASE MANAGEMENT | Age: 42
End: 2022-04-27

## 2022-04-27 NOTE — PROGRESS NOTES
Care Transitions Follow Up Call    Challenges to be reviewed by the provider   Additional needs identified to be addressed with provider: no  none           Method of communication with provider : none    Care Transition Nurse (CTN) contacted the patient by telephone to follow up after admission on 3/31/22. Verified name and  with patient as identifiers. Addressed changes since last contact: patient fell and is having back pain. instructed patient to alternate ice and heat 3 x per day and [facility] Follow up appointment completed? yes. Was follow up appointment scheduled within 7 days of discharge? yes. CTN reviewed discharge instructions, medical action plan and red flags with patient and discussed any barriers to care and/or understanding of plan of care after discharge. Discussed appropriate site of care based on symptoms and resources available to patient including: Specialist and Urgent Care Clinics. The patient agrees to contact the PCP office for questions related to their healthcare. Patients top risk factors for readmission: financial, functional physical ability, level of motivation, medication management, multiple health system providers, PCP relationship, polypharmacy, support system and transportation   Interventions to address risk factors: Education of patient/family/caregiver/guardian to support self-management-ctn discussed patient fall and need to alternate ice and heat and tylenol, and to seek urgent care if not better    [facility] follow up appointment(s):   Future Appointments   Date Time Provider Caryl Foley   2022  3:30 PM Kunal JOHNSTON   2022 11:30 AM MD ANNETTE Marcelino [facility] [place] BS AMB     Non-[facility] follow up appointment(s): none at this time    CTN provided contact information for future needs. Plan for follow-up call in 5-7 days based on severity of symptoms and risk factors.   Plan for next call: graduation call     Goals Addressed                 This Visit's Progress     Prevent complications post hospitalization. 2/15/22     Patient to check blood sugars 3 x a day   Patient to check bp daily   Patient to wash hansen with soap and water daily   Patient to attend pcp appt on 2/16 virtual and make appt with urology at u in one week.  Patient to complete antibiotic  and take all other meds as prescribed and listed in dc paperwork.  Ctn to follow up in one week. 131Ghislaine Brandon RN      2/22/22   Patient to check blood sugars 3 x a day   Patient to check bp daily   Patient to wash hansen with soap and water daily   Patient to attend pcp appt on 2/23 virtual and ctn called to make appt with urology at u this week.  Patient to complete antibiotic  and take all other meds as prescribed and listed in dc paperwork.  Ctn to follow up in  one week.  Maricarmen Fabian RN      3/21/22   Patient to go to urgent care due to open wound on leg and swelling.  Patient to check blood sugars 3 x a day   Patient to check bp daily   Ctn to follow up in 3 to 5 days. 131Ghislaine Brandon RN    4/14/22     Patient given number to [address] [place] [address] clinic to get eye injections   Patient to check bs 3 to 4 times a day.  Ctn to follow up in one week.  Maricarmen Fabian RN    4/27/22   Patient to go to urgent care for back pain.     Patient to apply ice and heat alternating 3 x a day for 2 hours on back for pain relief  

## 2022-05-08 ENCOUNTER — APPOINTMENT (OUTPATIENT)
Dept: GENERAL RADIOLOGY | Age: 42
DRG: 720 | End: 2022-05-08
Attending: NURSE PRACTITIONER
Payer: COMMERCIAL

## 2022-05-08 ENCOUNTER — APPOINTMENT (OUTPATIENT)
Dept: CT IMAGING | Age: 42
DRG: 720 | End: 2022-05-08
Attending: NURSE PRACTITIONER
Payer: COMMERCIAL

## 2022-05-08 ENCOUNTER — HOSPITAL ENCOUNTER (INPATIENT)
Age: 42
LOS: 6 days | Discharge: HOME HEALTH | DRG: 720 | End: 2022-05-14
Attending: EMERGENCY MEDICINE | Admitting: INTERNAL MEDICINE
Payer: COMMERCIAL

## 2022-05-08 DIAGNOSIS — E10.11 DIABETIC KETOACIDOSIS WITH COMA ASSOCIATED WITH TYPE 1 DIABETES MELLITUS (HCC): Primary | ICD-10-CM

## 2022-05-08 DIAGNOSIS — T68.XXXA HYPOTHERMIA, INITIAL ENCOUNTER: ICD-10-CM

## 2022-05-08 LAB
ADMINISTERED INITIALS, ADMINIT: NORMAL
ALBUMIN SERPL-MCNC: 1.4 G/DL (ref 3.5–5)
ALBUMIN/GLOB SERPL: 0.3 {RATIO} (ref 1.1–2.2)
ALP SERPL-CCNC: 262 U/L (ref 45–117)
ALT SERPL-CCNC: 28 U/L (ref 12–78)
ANION GAP SERPL CALC-SCNC: ABNORMAL MMOL/L (ref 5–15)
APPEARANCE UR: ABNORMAL
AST SERPL-CCNC: 17 U/L (ref 15–37)
BACTERIA URNS QL MICRO: ABNORMAL /HPF
BASE DEFICIT BLD-SCNC: 25.3 MMOL/L
BASE DEFICIT BLD-SCNC: 27.2 MMOL/L
BASOPHILS # BLD: 0 K/UL (ref 0–0.1)
BASOPHILS NFR BLD: 0 % (ref 0–1)
BILIRUB SERPL-MCNC: 0.4 MG/DL (ref 0.2–1)
BILIRUB UR QL: NEGATIVE
BUN SERPL-MCNC: 78 MG/DL (ref 6–20)
BUN/CREAT SERPL: 21 (ref 12–20)
CA-I BLD-MCNC: 1.09 MMOL/L (ref 1.12–1.32)
CA-I BLD-MCNC: 1.1 MMOL/L (ref 1.12–1.32)
CALCIUM SERPL-MCNC: 8.3 MG/DL (ref 8.5–10.1)
CHLORIDE BLD-SCNC: 94 MMOL/L (ref 100–108)
CHLORIDE BLD-SCNC: 99 MMOL/L (ref 100–108)
CHLORIDE SERPL-SCNC: 84 MMOL/L (ref 97–108)
CO2 BLD-SCNC: 8 MMOL/L (ref 19–24)
CO2 BLD-SCNC: <5 MMOL/L (ref 19–24)
CO2 SERPL-SCNC: <5 MMOL/L (ref 21–32)
COLOR UR: ABNORMAL
CREAT SERPL-MCNC: 3.76 MG/DL (ref 0.7–1.3)
CREAT UR-MCNC: 3.5 MG/DL (ref 0.6–1.3)
CREAT UR-MCNC: 3.6 MG/DL (ref 0.6–1.3)
D50 ADMINISTERED, D50ADM: 0 ML
D50 ORDER, D50ORD: 0 ML
DIFFERENTIAL METHOD BLD: ABNORMAL
EOSINOPHIL # BLD: 0 K/UL (ref 0–0.4)
EOSINOPHIL NFR BLD: 0 % (ref 0–7)
EPITH CASTS URNS QL MICRO: ABNORMAL /LPF
ERYTHROCYTE [DISTWIDTH] IN BLOOD BY AUTOMATED COUNT: 13.7 % (ref 11.5–14.5)
GLOBULIN SER CALC-MCNC: 5 G/DL (ref 2–4)
GLSCOM COMMENTS: NORMAL
GLUCOSE BLD STRIP.AUTO-MCNC: >600 MG/DL (ref 65–117)
GLUCOSE BLD STRIP.AUTO-MCNC: >700 MG/DL (ref 74–106)
GLUCOSE BLD STRIP.AUTO-MCNC: >700 MG/DL (ref 74–106)
GLUCOSE SERPL-MCNC: 1335 MG/DL (ref 65–100)
GLUCOSE UR STRIP.AUTO-MCNC: >1000 MG/DL
GLUCOSE, GLC: 601 MG/DL
HCO3 BLDA-SCNC: 3 MMOL/L
HCO3 BLDA-SCNC: 6 MMOL/L
HCT VFR BLD AUTO: 34.6 % (ref 36.6–50.3)
HGB BLD-MCNC: 9.8 G/DL (ref 12.1–17)
HGB UR QL STRIP: ABNORMAL
HIGH TARGET, HITG: 250 MG/DL
IMM GRANULOCYTES # BLD AUTO: 0 K/UL (ref 0–0.04)
IMM GRANULOCYTES NFR BLD AUTO: 0 % (ref 0–0.5)
INSULIN ADMINSTERED, INSADM: 10.8 UNITS/HOUR
INSULIN ADMINSTERED, INSADM: 16.2 UNITS/HOUR
INSULIN ADMINSTERED, INSADM: 21.6 UNITS/HOUR
INSULIN ADMINSTERED, INSADM: 27.1 UNITS/HOUR
INSULIN ORDER, INSORD: 10.8 UNITS/HOUR
INSULIN ORDER, INSORD: 16.2 UNITS/HOUR
INSULIN ORDER, INSORD: 21.6 UNITS/HOUR
INSULIN ORDER, INSORD: 27.1 UNITS/HOUR
KETONES UR QL STRIP.AUTO: >80 MG/DL
LACTATE BLD-SCNC: 1.43 MMOL/L (ref 0.4–2)
LACTATE BLD-SCNC: 1.75 MMOL/L (ref 0.4–2)
LEUKOCYTE ESTERASE UR QL STRIP.AUTO: NEGATIVE
LOW TARGET, LOT: 150 MG/DL
LYMPHOCYTES # BLD: 3.2 K/UL (ref 0.8–3.5)
LYMPHOCYTES NFR BLD: 19 % (ref 12–49)
MCH RBC QN AUTO: 28.8 PG (ref 26–34)
MCHC RBC AUTO-ENTMCNC: 28.3 G/DL (ref 30–36.5)
MCV RBC AUTO: 101.8 FL (ref 80–99)
METAMYELOCYTES NFR BLD MANUAL: 1 %
MINUTES UNTIL NEXT BG, NBG: 60 MIN
MONOCYTES # BLD: 0.2 K/UL (ref 0–1)
MONOCYTES NFR BLD: 1 % (ref 5–13)
MULTIPLIER, MUL: 0.02
MULTIPLIER, MUL: 0.03
MULTIPLIER, MUL: 0.04
MULTIPLIER, MUL: 0.05
MYELOCYTES NFR BLD MANUAL: 2 %
NEUTS BAND NFR BLD MANUAL: 1 %
NEUTS SEG # BLD: 12.9 K/UL (ref 1.8–8)
NEUTS SEG NFR BLD: 76 % (ref 32–75)
NITRITE UR QL STRIP.AUTO: NEGATIVE
NRBC # BLD: 0 K/UL (ref 0–0.01)
NRBC BLD-RTO: 0 PER 100 WBC
ORDER INITIALS, ORDINIT: NORMAL
PCO2 BLDV: 15.3 MMHG (ref 41–51)
PCO2 BLDV: 30.2 MMHG (ref 41–51)
PH BLDV: 6.91 [PH] (ref 7.32–7.42)
PH BLDV: 6.95 [PH] (ref 7.32–7.42)
PH UR STRIP: 5.5 [PH] (ref 5–8)
PLATELET # BLD AUTO: 557 K/UL (ref 150–400)
PLATELET COMMENTS,PCOM: ABNORMAL
PMV BLD AUTO: 10.8 FL (ref 8.9–12.9)
PO2 BLDV: 38 MMHG (ref 25–40)
PO2 BLDV: 40 MMHG (ref 25–40)
POTASSIUM BLD-SCNC: 5.3 MMOL/L (ref 3.5–5.5)
POTASSIUM BLD-SCNC: 7.7 MMOL/L (ref 3.5–5.5)
POTASSIUM SERPL-SCNC: 7.3 MMOL/L (ref 3.5–5.1)
PROT SERPL-MCNC: 6.4 G/DL (ref 6.4–8.2)
PROT UR STRIP-MCNC: 100 MG/DL
RBC # BLD AUTO: 3.4 M/UL (ref 4.1–5.7)
RBC #/AREA URNS HPF: ABNORMAL /HPF (ref 0–5)
RBC MORPH BLD: ABNORMAL
RBC MORPH BLD: ABNORMAL
SERVICE CMNT-IMP: ABNORMAL
SODIUM BLD-SCNC: 112 MMOL/L (ref 136–145)
SODIUM BLD-SCNC: 119 MMOL/L (ref 136–145)
SODIUM SERPL-SCNC: 116 MMOL/L (ref 136–145)
SP GR UR REFRACTOMETRY: 1.01 (ref 1–1.03)
SPECIMEN SITE: ABNORMAL
SPECIMEN SITE: ABNORMAL
UA: UC IF INDICATED,UAUC: ABNORMAL
UROBILINOGEN UR QL STRIP.AUTO: 0.2 EU/DL (ref 0.2–1)
WBC # BLD AUTO: 16.8 K/UL (ref 4.1–11.1)
WBC URNS QL MICRO: ABNORMAL /HPF (ref 0–4)

## 2022-05-08 PROCEDURE — 84145 PROCALCITONIN (PCT): CPT

## 2022-05-08 PROCEDURE — 82947 ASSAY GLUCOSE BLOOD QUANT: CPT

## 2022-05-08 PROCEDURE — 74011250636 HC RX REV CODE- 250/636: Performed by: NURSE PRACTITIONER

## 2022-05-08 PROCEDURE — 82330 ASSAY OF CALCIUM: CPT

## 2022-05-08 PROCEDURE — 74011250636 HC RX REV CODE- 250/636: Performed by: INTERNAL MEDICINE

## 2022-05-08 PROCEDURE — 74011000250 HC RX REV CODE- 250: Performed by: NURSE PRACTITIONER

## 2022-05-08 PROCEDURE — 74011000258 HC RX REV CODE- 258: Performed by: INTERNAL MEDICINE

## 2022-05-08 PROCEDURE — 93005 ELECTROCARDIOGRAM TRACING: CPT

## 2022-05-08 PROCEDURE — 96375 TX/PRO/DX INJ NEW DRUG ADDON: CPT

## 2022-05-08 PROCEDURE — 81001 URINALYSIS AUTO W/SCOPE: CPT

## 2022-05-08 PROCEDURE — 87186 SC STD MICRODIL/AGAR DIL: CPT

## 2022-05-08 PROCEDURE — 74176 CT ABD & PELVIS W/O CONTRAST: CPT

## 2022-05-08 PROCEDURE — 36415 COLL VENOUS BLD VENIPUNCTURE: CPT

## 2022-05-08 PROCEDURE — 96374 THER/PROPH/DIAG INJ IV PUSH: CPT

## 2022-05-08 PROCEDURE — 74011636637 HC RX REV CODE- 636/637: Performed by: EMERGENCY MEDICINE

## 2022-05-08 PROCEDURE — 51702 INSERT TEMP BLADDER CATH: CPT

## 2022-05-08 PROCEDURE — 71045 X-RAY EXAM CHEST 1 VIEW: CPT

## 2022-05-08 PROCEDURE — 74011250636 HC RX REV CODE- 250/636: Performed by: EMERGENCY MEDICINE

## 2022-05-08 PROCEDURE — 96361 HYDRATE IV INFUSION ADD-ON: CPT

## 2022-05-08 PROCEDURE — 87086 URINE CULTURE/COLONY COUNT: CPT

## 2022-05-08 PROCEDURE — 80053 COMPREHEN METABOLIC PANEL: CPT

## 2022-05-08 PROCEDURE — 82962 GLUCOSE BLOOD TEST: CPT

## 2022-05-08 PROCEDURE — 99285 EMERGENCY DEPT VISIT HI MDM: CPT

## 2022-05-08 PROCEDURE — 87077 CULTURE AEROBIC IDENTIFY: CPT

## 2022-05-08 PROCEDURE — 65620000000 HC RM CCU GENERAL

## 2022-05-08 PROCEDURE — 74011000258 HC RX REV CODE- 258: Performed by: EMERGENCY MEDICINE

## 2022-05-08 PROCEDURE — 51798 US URINE CAPACITY MEASURE: CPT

## 2022-05-08 PROCEDURE — 85025 COMPLETE CBC W/AUTO DIFF WBC: CPT

## 2022-05-08 RX ORDER — ONDANSETRON 4 MG/1
4 TABLET, ORALLY DISINTEGRATING ORAL
Status: DISCONTINUED | OUTPATIENT
Start: 2022-05-08 | End: 2022-05-14 | Stop reason: HOSPADM

## 2022-05-08 RX ORDER — VANCOMYCIN 1.75 GRAM/500 ML IN 0.9 % SODIUM CHLORIDE INTRAVENOUS
1750 ONCE
Status: COMPLETED | OUTPATIENT
Start: 2022-05-08 | End: 2022-05-09

## 2022-05-08 RX ORDER — SODIUM CHLORIDE 9 MG/ML
250 INJECTION, SOLUTION INTRAVENOUS CONTINUOUS
Status: DISCONTINUED | OUTPATIENT
Start: 2022-05-08 | End: 2022-05-09

## 2022-05-08 RX ORDER — ONDANSETRON 2 MG/ML
4 INJECTION INTRAMUSCULAR; INTRAVENOUS
Status: DISCONTINUED | OUTPATIENT
Start: 2022-05-08 | End: 2022-05-14 | Stop reason: HOSPADM

## 2022-05-08 RX ORDER — DEXTROSE MONOHYDRATE 100 MG/ML
0-250 INJECTION, SOLUTION INTRAVENOUS AS NEEDED
Status: DISCONTINUED | OUTPATIENT
Start: 2022-05-08 | End: 2022-05-10

## 2022-05-08 RX ORDER — LORAZEPAM 2 MG/ML
1 INJECTION INTRAMUSCULAR ONCE
Status: COMPLETED | OUTPATIENT
Start: 2022-05-08 | End: 2022-05-08

## 2022-05-08 RX ORDER — MAGNESIUM SULFATE 100 %
4 CRYSTALS MISCELLANEOUS AS NEEDED
Status: DISCONTINUED | OUTPATIENT
Start: 2022-05-08 | End: 2022-05-10

## 2022-05-08 RX ORDER — ACETAMINOPHEN 325 MG/1
650 TABLET ORAL
Status: DISCONTINUED | OUTPATIENT
Start: 2022-05-08 | End: 2022-05-14 | Stop reason: HOSPADM

## 2022-05-08 RX ORDER — SODIUM CHLORIDE 0.9 % (FLUSH) 0.9 %
5-40 SYRINGE (ML) INJECTION AS NEEDED
Status: DISCONTINUED | OUTPATIENT
Start: 2022-05-08 | End: 2022-05-14 | Stop reason: HOSPADM

## 2022-05-08 RX ORDER — INSULIN LISPRO 100 [IU]/ML
INJECTION, SOLUTION INTRAVENOUS; SUBCUTANEOUS
Status: DISCONTINUED | OUTPATIENT
Start: 2022-05-08 | End: 2022-05-10

## 2022-05-08 RX ORDER — SODIUM CHLORIDE 0.9 % (FLUSH) 0.9 %
5-40 SYRINGE (ML) INJECTION EVERY 8 HOURS
Status: DISCONTINUED | OUTPATIENT
Start: 2022-05-08 | End: 2022-05-14 | Stop reason: HOSPADM

## 2022-05-08 RX ORDER — LIDOCAINE HYDROCHLORIDE 20 MG/ML
JELLY TOPICAL
Status: DISPENSED
Start: 2022-05-08 | End: 2022-05-09

## 2022-05-08 RX ORDER — CALCIUM GLUCONATE 94 MG/ML
1 INJECTION, SOLUTION INTRAVENOUS ONCE
Status: COMPLETED | OUTPATIENT
Start: 2022-05-08 | End: 2022-05-08

## 2022-05-08 RX ORDER — ENOXAPARIN SODIUM 100 MG/ML
30 INJECTION SUBCUTANEOUS DAILY
Status: DISCONTINUED | OUTPATIENT
Start: 2022-05-09 | End: 2022-05-09

## 2022-05-08 RX ORDER — SODIUM BICARBONATE 1 MEQ/ML
50 SYRINGE (ML) INTRAVENOUS ONCE
Status: COMPLETED | OUTPATIENT
Start: 2022-05-08 | End: 2022-05-08

## 2022-05-08 RX ORDER — LIDOCAINE HYDROCHLORIDE 20 MG/ML
JELLY TOPICAL
Status: DISPENSED | OUTPATIENT
Start: 2022-05-08 | End: 2022-05-09

## 2022-05-08 RX ORDER — POLYETHYLENE GLYCOL 3350 17 G/17G
17 POWDER, FOR SOLUTION ORAL DAILY PRN
Status: DISCONTINUED | OUTPATIENT
Start: 2022-05-08 | End: 2022-05-14 | Stop reason: HOSPADM

## 2022-05-08 RX ORDER — ACETAMINOPHEN 650 MG/1
650 SUPPOSITORY RECTAL
Status: DISCONTINUED | OUTPATIENT
Start: 2022-05-08 | End: 2022-05-14 | Stop reason: HOSPADM

## 2022-05-08 RX ADMIN — SODIUM BICARBONATE 50 MEQ: 84 INJECTION, SOLUTION INTRAVENOUS at 19:58

## 2022-05-08 RX ADMIN — PIPERACILLIN AND TAZOBACTAM 4.5 G: 4; .5 INJECTION, POWDER, LYOPHILIZED, FOR SOLUTION INTRAVENOUS at 22:49

## 2022-05-08 RX ADMIN — CALCIUM GLUCONATE 1 G: 98 INJECTION, SOLUTION INTRAVENOUS at 18:50

## 2022-05-08 RX ADMIN — SODIUM BICARBONATE 50 MEQ: 84 INJECTION, SOLUTION INTRAVENOUS at 19:55

## 2022-05-08 RX ADMIN — SODIUM CHLORIDE 1000 ML: 9 INJECTION, SOLUTION INTRAVENOUS at 18:14

## 2022-05-08 RX ADMIN — Medication 10.8 UNITS/HR: at 19:18

## 2022-05-08 RX ADMIN — SODIUM CHLORIDE 150 ML/HR: 9 INJECTION, SOLUTION INTRAVENOUS at 22:47

## 2022-05-08 RX ADMIN — SODIUM CHLORIDE 1000 ML: 9 INJECTION, SOLUTION INTRAVENOUS at 19:50

## 2022-05-08 RX ADMIN — LORAZEPAM 1 MG: 2 INJECTION INTRAMUSCULAR; INTRAVENOUS at 21:24

## 2022-05-08 RX ADMIN — SODIUM CHLORIDE, PRESERVATIVE FREE 10 ML: 5 INJECTION INTRAVENOUS at 22:21

## 2022-05-08 NOTE — ED TRIAGE NOTES
Pt arrived via EMS with AMS pt is diabetic and when reading BS it read HI on EMS ans personal metr at home per pt mother.  Placed placed on monitors in room and iv placed x2

## 2022-05-08 NOTE — ED PROVIDER NOTES
EMERGENCY DEPARTMENT HISTORY AND PHYSICAL EXAM      Date: 5/8/2022  Patient Name: Lloyd Wilde    History of Presenting Illness     Chief Complaint   Patient presents with    High Blood Sugar       History Provided By: EMS    HPI: Lloyd Wilde, 46 y.o. male with PMHx significant for bipolar disorder, diabetes, who presents with altered mental status. According to EMS, blood sugar reading high on their meter. Patient is somnolent but will arouse and state his name and occasionally mumble. He will stick out his tongue when asked. Otherwise not providing any meaningful history. Additional history and review of systems is limited by altered mental status      PCP: Minh Maharaj MD    There are no other complaints, changes, or physical findings at this time.     Current Facility-Administered Medications   Medication Dose Route Frequency Provider Last Rate Last Admin    insulin regular (NOVOLIN R, HUMULIN R) 100 Units in 0.9% sodium chloride 100 mL infusion  0-50 Units/hr IntraVENous TITRATE Della Young MD 16.2 mL/hr at 05/08/22 2030 16.2 Units/hr at 05/08/22 2030    insulin lispro (HUMALOG) injection   SubCUTAneous TIDAC Della Young MD        glucose chewable tablet 16 g  4 Tablet Oral PRN Della Young MD        dextrose 10% infusion 0-250 mL  0-250 mL IntraVENous PRN Della Young MD        glucagon (GLUCAGEN) injection 1 mg  1 mg IntraMUSCular PRN George Sparks MD        0.9% sodium chloride infusion  150 mL/hr IntraVENous CONTINUOUS Russel PatiBrenda powers NP        lidocaine (URO-JET/ GLYDO) 2 % jelly   Urethral NOW Della Young MD        sodium chloride (NS) flush 5-40 mL  5-40 mL IntraVENous Q8H Brenda Dumas NP        sodium chloride (NS) flush 5-40 mL  5-40 mL IntraVENous PRN Brian Barragan NP        acetaminophen (TYLENOL) tablet 650 mg  650 mg Oral Q6H PRN Brian Barragan NP        Or    acetaminophen (TYLENOL) suppository 650 mg  650 mg Rectal Q6H PRN Cristin Moment, NP        polyethylene glycol (MIRALAX) packet 17 g  17 g Oral DAILY PRN Cristin Moment, NP        ondansetron (ZOFRAN ODT) tablet 4 mg  4 mg Oral Q8H PRN Cristin Moment, NP        Or    ondansetron [facility]) injection 4 mg  4 mg IntraVENous Q6H PRN Cristin Moment, NP       Cervantes [START ON 5/9/2022] enoxaparin (LOVENOX) injection 30 mg  30 mg SubCUTAneous DAILY Cristin Moment, NP       Cervantes [START ON 5/9/2022] piperacillin-tazobactam (ZOSYN) 3.375 g in 0.9% sodium chloride (MBP/ADV) 100 mL MBP  3.375 g IntraVENous Q8H Sandie Dumas Reggie, NP        piperacillin-tazobactam (ZOSYN) 4.5 g in 0.9% sodium chloride (MBP/ADV) 100 mL MBP  4.5 g IntraVENous ONCE Germania Mcgill Mt, MD         Current Outpatient Medications   Medication Sig Dispense Refill    insulin aspart U-100 (NovoLOG Flexpen U-100 Insulin) 100 unit/mL (3 mL) inpn 2 Units by SubCUTAneous route Before breakfast, lunch, and dinner. 10 Adjustable Dose Pre-filled Pen Syringe 3    ondansetron (ZOFRAN ODT) 8 mg disintegrating tablet Take 1 Tablet by mouth every eight (8) hours as needed for Nausea or Vomiting. 12 Tablet 0    naloxone (Narcan) 4 mg/actuation nasal spray Use 1 spray intranasally, then discard. Repeat with new spray every 2 min as needed for opioid overdose symptoms, alternating nostrils. 1 Each 0    pen needle, diabetic 30 gauge x 3/16\" ndle 5 Units by Does Not Apply route Before breakfast, lunch, and dinner. 100 Each 3    amLODIPine (NORVASC) 5 mg tablet Take 1 Tablet by mouth daily. 30 Tablet 1    carvediloL (COREG) 12.5 mg tablet Take 1 Tablet by mouth two (2) times daily (with meals). 60 Tablet 1    finasteride (PROSCAR) 5 mg tablet Take 1 Tablet by mouth daily. 30 Tablet 2    rosuvastatin (CRESTOR) 40 mg tablet Take 1 Tablet by mouth nightly.  30 Tablet 0    ondansetron (ZOFRAN ODT) 4 mg disintegrating tablet Take 1 Tablet by mouth every eight (8) hours as needed for Nausea or Vomiting. 20 Tablet 0    DULoxetine (CYMBALTA) 60 mg capsule Take 1 Capsule by mouth daily. 30 Capsule 3    pantoprazole (Protonix) 40 mg tablet Take 1 Tablet by mouth daily. 30 Tablet 0    ergocalciferol (ERGOCALCIFEROL) 1,250 mcg (50,000 unit) capsule Take 1 capsule by mouth once a week 12 Capsule 0    pregabalin (Lyrica) 75 mg capsule Take 75 mg by mouth two (2) times a day. Past History     Past Medical History:  Past Medical History:   Diagnosis Date    Bipolar 1 disorder, depressed ([facility]     Bipolar disorder ([address]     Depression     Diabetes ([facility]     DKA, type 1 ([facility] [address] 2013    diagnosed age 23    H/O noncompliance with medical treatment, presenting hazards to health     MRSA (methicillin resistant staph aureus) culture positive     MRSA (methicillin resistant Staphylococcus aureus)     Face    Noncompliance with medication regimen     Second hand smoke exposure     Seizure ([facility] [facility]     Seizures ([facility] [address]  or     one episode during prison     Past Surgical History:  Past Surgical History:   Procedure Laterality Date    HX HEENT      top left wisdom tooth    HX ORTHOPAEDIC Left     wrist; MCV    UPPER GI ENDOSCOPY,BIOPSY  2018          Family History:  Family History   Problem Relation Age of Onset    Diabetes Mother     Diabetes Other         neice, type 1      Social History:  Social History     Tobacco Use    Smoking status: Former Smoker     Packs/day: 0.10     Years: 16.00     Pack years: 1.60     Types: Cigarettes     Start date: 10/4/1999     Quit date: 2020     Years since quittin.2    Smokeless tobacco: Never Used   Substance Use Topics    Alcohol use: No    Drug use: No     Allergies:  No Known Allergies  Review of Systems   Review of Systems   Unable to perform ROS: Mental status change     Physical Exam   Physical Exam  Vitals and nursing note reviewed.    Constitutional:       General: He is not in acute distress. Appearance: He is well-developed. HENT:      Head: Normocephalic and atraumatic. Mouth/Throat:      Mouth: Mucous membranes are dry. Eyes:      Conjunctiva/sclera: Conjunctivae normal.      Pupils: Pupils are equal, round, and reactive to light. Cardiovascular:      Rate and Rhythm: Normal rate and regular rhythm. Pulmonary:      Effort: No respiratory distress. Breath sounds: Normal breath sounds. No stridor. Comments: Kussmaul respirations   Abdominal:      General: There is no distension. Palpations: Abdomen is soft. Tenderness: There is no abdominal tenderness. Musculoskeletal:         General: Normal range of motion. Cervical back: Normal range of motion. Skin:     General: Skin is warm and dry. Neurological:      Mental Status: He is lethargic. Diagnostic Study Results   Labs -     Recent Results (from the past 12 hour(s))   CBC WITH AUTOMATED DIFF    Collection Time: 05/08/22  6:13 PM   Result Value Ref Range    WBC 16.8 (H) 4.1 - 11.1 K/uL    RBC 3.40 (L) 4.10 - 5.70 M/uL    HGB 9.8 (L) 12.1 - 17.0 g/dL    HCT 34.6 (L) 36.6 - 50.3 %    .8 (H) 80.0 - 99.0 FL    MCH 28.8 26.0 - 34.0 PG    MCHC 28.3 (L) 30.0 - 36.5 g/dL    RDW 13.7 11.5 - 14.5 %    PLATELET 936 (H) 963 - 400 K/uL    MPV 10.8 8.9 - 12.9 FL    NRBC 0.0 0  WBC    ABSOLUTE NRBC 0.00 0.00 - 0.01 K/uL    NEUTROPHILS 76 (H) 32 - 75 %    BAND NEUTROPHILS 1 %    LYMPHOCYTES 19 12 - 49 %    MONOCYTES 1 (L) 5 - 13 %    EOSINOPHILS 0 0 - 7 %    BASOPHILS 0 0 - 1 %    METAMYELOCYTES 1 %    MYELOCYTES 2 %    IMMATURE GRANULOCYTES 0 0.0 - 0.5 %    ABS. NEUTROPHILS 12.9 (H) 1.8 - 8.0 K/UL    ABS. LYMPHOCYTES 3.2 0.8 - 3.5 K/UL    ABS. MONOCYTES 0.2 0.0 - 1.0 K/UL    ABS. EOSINOPHILS 0.0 0.0 - 0.4 K/UL    ABS. BASOPHILS 0.0 0.0 - 0.1 K/UL    ABS. IMM.  GRANS. 0.0 0.00 - 0.04 K/UL    DF MANUAL      PLATELET COMMENTS Increased Platelets      RBC COMMENTS ANISOCYTOSIS  1+        RBC COMMENTS MACROCYTOSIS  1+       METABOLIC PANEL, COMPREHENSIVE    Collection Time: 05/08/22  6:13 PM   Result Value Ref Range    Sodium 116 (LL) 136 - 145 mmol/L    Potassium 7.3 (HH) 3.5 - 5.1 mmol/L    Chloride 84 (L) 97 - 108 mmol/L    CO2 <5 (LL) 21 - 32 mmol/L    Anion gap Cannot be calculated 5 - 15 mmol/L    Glucose 1,335 (HH) 65 - 100 mg/dL    BUN 78 (H) 6 - 20 MG/DL    Creatinine 3.76 (H) 0.70 - 1.30 MG/DL    BUN/Creatinine ratio 21 (H) 12 - 20      GFR est AA 22 (L) >60 ml/min/1.73m2    GFR est non-AA 18 (L) >60 ml/min/1.73m2    Calcium 8.3 (L) 8.5 - 10.1 MG/DL    Bilirubin, total 0.4 0.2 - 1.0 MG/DL    ALT (SGPT) 28 12 - 78 U/L    AST (SGOT) 17 15 - 37 U/L    Alk.  phosphatase 262 (H) 45 - 117 U/L    Protein, total 6.4 6.4 - 8.2 g/dL    Albumin 1.4 (L) 3.5 - 5.0 g/dL    Globulin 5.0 (H) 2.0 - 4.0 g/dL    A-G Ratio 0.3 (L) 1.1 - 2.2     BLOOD GAS,CHEM8,LACTIC ACID POC    Collection Time: 05/08/22  6:15 PM   Result Value Ref Range    Calcium, ionized (POC) 1.09 (L) 1.12 - 1.32 mmol/L    BICARBONATE 3 mmol/L    Base deficit (POC) 27.2 mmol/L    Sample source VENOUS BLOOD      CO2, POC <5 (LL) 19 - 24 MMOL/L    Sodium,  (LL) 136 - 145 MMOL/L    Potassium, POC 7.7 (HH) 3.5 - 5.5 MMOL/L    Chloride, POC 94 (L) 100 - 108 MMOL/L    Glucose, POC >700 (HH) 74 - 106 MG/DL    Creatinine, POC 3.6 (H) 0.6 - 1.3 MG/DL    Lactic Acid (POC) 1.43 0.40 - 2.00 mmol/L    Critical value read back BART     pH, venous (POC) 6.95 (LL) 7.32 - 7.42      pCO2, venous (POC) 15.3 (L) 41 - 51 MMHG    pO2, venous (POC) 38 25 - 40 mmHg   GLUCOSTABILIZER    Collection Time: 05/08/22  7:17 PM   Result Value Ref Range    Glucose 601 mg/dL    Insulin order 10.8 units/hour    Insulin adminstered 10.8 units/hour    Multiplier 0.020     Low target 150 mg/dL    High target 250 mg/dL    D50 order 0.0 ml    D50 administered 0.00 ml    Minutes until next BG 60 min    Order initials KR     Administered initials KR     GLSCOM Comments URINALYSIS W/ REFLEX CULTURE    Collection Time: 05/08/22  7:54 PM    Specimen: Urine   Result Value Ref Range    Color YELLOW/STRAW      Appearance HAZY (A) CLEAR      Specific gravity 1.015 1.003 - 1.030      pH (UA) 5.5 5.0 - 8.0      Protein 100 (A) NEG mg/dL    Glucose >1,000 (A) NEG mg/dL    Ketone >80 (A) NEG mg/dL    Bilirubin Negative NEG      Blood MODERATE (A) NEG      Urobilinogen 0.2 0.2 - 1.0 EU/dL    Nitrites Negative NEG      Leukocyte Esterase Negative NEG      WBC 20-50 0 - 4 /hpf    RBC 0-5 0 - 5 /hpf    Epithelial cells FEW FEW /lpf    Bacteria 4+ (A) NEG /hpf    UA:UC IF INDICATED URINE CULTURE ORDERED (A) CNI     GLUCOSE, POC    Collection Time: 05/08/22  8:22 PM   Result Value Ref Range    Glucose (POC) >600 (HH) 65 - 117 mg/dL    Performed by Griffin De La Rosa (GO RN)    GLUCOSTABILIZER    Collection Time: 05/08/22  8:29 PM   Result Value Ref Range    Glucose 601 mg/dL    Insulin order 16.2 units/hour    Insulin adminstered 16.2 units/hour    Multiplier 0.030     Low target 150 mg/dL    High target 250 mg/dL    D50 order 0.0 ml    D50 administered 0.00 ml    Minutes until next BG 60 min    Order initials gw     Administered initials sb     GLSCOM Comments         Radiologic Studies -   XR CHEST PORT   Final Result   No evidence of acute cardiopulmonary process. CT ABD PELV WO CONT    (Results Pending)     XR CHEST PORT    Result Date: 5/8/2022  No evidence of acute cardiopulmonary process. Medical Decision Making   I am the first provider for this patient. I reviewed the vital signs, available nursing notes, past medical history, past surgical history, family history and social history. Vital Signs-Reviewed the patient's vital signs.   Patient Vitals for the past 12 hrs:   Temp Pulse Resp BP SpO2   05/08/22 2013 -- 65 -- (!) 103/55 100 %   05/08/22 2010 (!) 88.9 °F (31.6 °C) 61 (!) 32 107/72 100 %   05/08/22 1958 -- 65 17 107/72 100 %   05/08/22 1943 -- 60 20 109/70 99 % 05/08/22 1928 -- 61 26 110/64 99 %   05/08/22 1822 97.9 °F (36.6 °C) (!) 59 28 (!) 103/57 100 %   05/08/22 1819 -- -- -- (!) (P) 97/56 --   05/08/22 1755 97.9 °F (36.6 °C) 72 26 (!) 103/57 100 %       Pulse Oximetry Analysis - 100% on ra    Cardiac Monitor:   Rate: 72 bpm  Rhythm: Normal Sinus Rhythm      ED EKG interpretation:  Rhythm: normal sinus rhythm; and regular . Rate (approx.): 74; Axis: normal; P wave: normal; QRS interval: normal ; ST/T wave: non-specific changes; Other findings: borderline ekg. This EKG was interpreted by KEKE Desai MD,ED Provider. Records Reviewed: Nursing Notes and Old Medical Records    Provider Notes (Medical Decision Making):   Patient presents with altered mental status in the setting of complex sugar. On presentation he is lethargic, confused, but does follow some commands. Occasional respirations noted with significantly dry mucous membranes. Suspect likely DKA. Will check VBG, basic labs, UA, start IV fluids    ED Course:   Initial assessment performed. The patients presenting problems have been discussed, and they are in agreement with the care plan formulated and outlined with them. I have encouraged them to ask questions as they arise throughout their visit. Point-of-care testing notable for pH of 6.9. Bicarb less than 5. Glucose above testable limit. Insulin drip ordered. Labs returned with sugar of 1335, FELECIA with a creatinine of 3.7. Potassium is 7.3. I suspect this will correct with IV fluids and insulin. Was given a dose of calcium gluconate. Given severity of DKA, I discussed with Reginaldo Mcginnis NP from the ICU. She was seen and that the patient. Temperature was rechecked checked rectally, 88.9. I suspect his hypothermia is related to his DKA and not related to sepsis. Noted to have urinary retention as well. Beard catheter placed by RN.        Procedures:  Procedures    Critical Care:  CRITICAL CARE NOTE :  IMPENDING DETERIORATION -Metabolic and Renal  ASSOCIATED RISK FACTORS - Hypotension, Shock and Metabolic changes  MANAGEMENT- Bedside Assessment  INTERPRETATION -  Blood Gases, ECG and Blood Pressure  INTERVENTIONS - hemodynamic mngmt and Metobolic interventions  CASE REVIEW - Hospitalist/Intensivist and Nursing  TREATMENT RESPONSE -Unchanged   PERFORMED BY - Self    NOTES   :    I have spent 65 minutes of critical care time involved in lab review, consultations with specialist, family decision- making, bedside attention and documentation. During this entire length of time I was immediately available to the patient . aCndy Prado MD      Disposition:    Admission Note:  Patient is being admitted to the hospital by Service: ICU. The results of their tests and reasons for their admission have been discussed with them and available family. They convey agreement and understanding for the need to be admitted and for their admission diagnosis. Diagnosis     Clinical Impression:   1. Diabetic ketoacidosis with coma associated with type 1 diabetes mellitus ([facility] [address]    2. Hypothermia, initial encounter            Please note that this dictation was completed with uTrail me, the computer voice recognition software. Quite often unanticipated grammatical, syntax, homophones, and other interpretive errors are inadvertently transcribed by the computer software. Please disregard these errors.   Please excuse any errors that have escaped final proofreading

## 2022-05-09 ENCOUNTER — APPOINTMENT (OUTPATIENT)
Dept: ULTRASOUND IMAGING | Age: 42
DRG: 720 | End: 2022-05-09
Attending: NURSE PRACTITIONER
Payer: COMMERCIAL

## 2022-05-09 LAB
ADMINISTERED INITIALS, ADMINIT: NORMAL
AMPHET UR QL SCN: NEGATIVE
ANION GAP SERPL CALC-SCNC: 12 MMOL/L (ref 5–15)
ANION GAP SERPL CALC-SCNC: 20 MMOL/L (ref 5–15)
ANION GAP SERPL CALC-SCNC: 24 MMOL/L (ref 5–15)
ANION GAP SERPL CALC-SCNC: 8 MMOL/L (ref 5–15)
ANION GAP SERPL CALC-SCNC: 9 MMOL/L (ref 5–15)
APPEARANCE UR: ABNORMAL
ATRIAL RATE: 60 BPM
BACTERIA URNS QL MICRO: ABNORMAL /HPF
BARBITURATES UR QL SCN: NEGATIVE
BASE DEFICIT BLD-SCNC: 9.5 MMOL/L
BASE DEFICIT BLDV-SCNC: 14 MMOL/L
BASOPHILS # BLD: 0 K/UL (ref 0–0.1)
BASOPHILS NFR BLD: 0 % (ref 0–1)
BENZODIAZ UR QL: NEGATIVE
BILIRUB UR QL: NEGATIVE
BODY TEMPERATURE: 98.1
BUN SERPL-MCNC: 64 MG/DL (ref 6–20)
BUN SERPL-MCNC: 69 MG/DL (ref 6–20)
BUN SERPL-MCNC: 70 MG/DL (ref 6–20)
BUN SERPL-MCNC: 74 MG/DL (ref 6–20)
BUN SERPL-MCNC: 79 MG/DL (ref 6–20)
BUN/CREAT SERPL: 19 (ref 12–20)
BUN/CREAT SERPL: 20 (ref 12–20)
BUN/CREAT SERPL: 20 (ref 12–20)
BUN/CREAT SERPL: 21 (ref 12–20)
BUN/CREAT SERPL: 22 (ref 12–20)
CA-I BLD-MCNC: 1.1 MMOL/L (ref 1.12–1.32)
CALCIUM SERPL-MCNC: 7.5 MG/DL (ref 8.5–10.1)
CALCIUM SERPL-MCNC: 7.7 MG/DL (ref 8.5–10.1)
CALCIUM SERPL-MCNC: 8.1 MG/DL (ref 8.5–10.1)
CALCULATED P AXIS, ECG09: 55 DEGREES
CALCULATED R AXIS, ECG10: 74 DEGREES
CALCULATED T AXIS, ECG11: 55 DEGREES
CANNABINOIDS UR QL SCN: NEGATIVE
CHLORIDE BLD-SCNC: 108 MMOL/L (ref 100–108)
CHLORIDE SERPL-SCNC: 102 MMOL/L (ref 97–108)
CHLORIDE SERPL-SCNC: 104 MMOL/L (ref 97–108)
CHLORIDE SERPL-SCNC: 109 MMOL/L (ref 97–108)
CHLORIDE SERPL-SCNC: 109 MMOL/L (ref 97–108)
CHLORIDE SERPL-SCNC: 96 MMOL/L (ref 97–108)
CO2 BLD-SCNC: 17 MMOL/L (ref 19–24)
CO2 SERPL-SCNC: 12 MMOL/L (ref 21–32)
CO2 SERPL-SCNC: 18 MMOL/L (ref 21–32)
CO2 SERPL-SCNC: 18 MMOL/L (ref 21–32)
CO2 SERPL-SCNC: 19 MMOL/L (ref 21–32)
CO2 SERPL-SCNC: 8 MMOL/L (ref 21–32)
COCAINE UR QL SCN: NEGATIVE
COLOR UR: ABNORMAL
COMMENT, HOLDF: NORMAL
CORTIS SERPL-MCNC: 42.3 UG/DL
CREAT SERPL-MCNC: 3.23 MG/DL (ref 0.7–1.3)
CREAT SERPL-MCNC: 3.34 MG/DL (ref 0.7–1.3)
CREAT SERPL-MCNC: 3.6 MG/DL (ref 0.7–1.3)
CREAT SERPL-MCNC: 3.71 MG/DL (ref 0.7–1.3)
CREAT SERPL-MCNC: 3.72 MG/DL (ref 0.7–1.3)
CREAT UR-MCNC: 3.6 MG/DL (ref 0.6–1.3)
D50 ADMINISTERED, D50ADM: 0 ML
D50 ORDER, D50ORD: 0 ML
DIAGNOSIS, 93000: NORMAL
DIFFERENTIAL METHOD BLD: ABNORMAL
DRUG SCRN COMMENT,DRGCM: NORMAL
EOSINOPHIL # BLD: 0 K/UL (ref 0–0.4)
EOSINOPHIL NFR BLD: 0 % (ref 0–7)
EPITH CASTS URNS QL MICRO: ABNORMAL /LPF
ERYTHROCYTE [DISTWIDTH] IN BLOOD BY AUTOMATED COUNT: 13.3 % (ref 11.5–14.5)
FIO2 ON VENT: 21 %
GLSCOM COMMENTS: NORMAL
GLUCOSE BLD STRIP.AUTO-MCNC: 104 MG/DL (ref 65–117)
GLUCOSE BLD STRIP.AUTO-MCNC: 107 MG/DL (ref 65–117)
GLUCOSE BLD STRIP.AUTO-MCNC: 117 MG/DL (ref 65–117)
GLUCOSE BLD STRIP.AUTO-MCNC: 122 MG/DL (ref 65–117)
GLUCOSE BLD STRIP.AUTO-MCNC: 128 MG/DL (ref 65–117)
GLUCOSE BLD STRIP.AUTO-MCNC: 132 MG/DL (ref 65–117)
GLUCOSE BLD STRIP.AUTO-MCNC: 140 MG/DL (ref 65–117)
GLUCOSE BLD STRIP.AUTO-MCNC: 159 MG/DL (ref 65–117)
GLUCOSE BLD STRIP.AUTO-MCNC: 166 MG/DL (ref 65–117)
GLUCOSE BLD STRIP.AUTO-MCNC: 222 MG/DL (ref 65–117)
GLUCOSE BLD STRIP.AUTO-MCNC: 238 MG/DL (ref 65–117)
GLUCOSE BLD STRIP.AUTO-MCNC: 341 MG/DL (ref 65–117)
GLUCOSE BLD STRIP.AUTO-MCNC: 346 MG/DL (ref 74–106)
GLUCOSE BLD STRIP.AUTO-MCNC: 403 MG/DL (ref 65–117)
GLUCOSE BLD STRIP.AUTO-MCNC: 479 MG/DL (ref 65–117)
GLUCOSE BLD STRIP.AUTO-MCNC: 558 MG/DL (ref 65–117)
GLUCOSE BLD STRIP.AUTO-MCNC: 587 MG/DL (ref 65–117)
GLUCOSE BLD STRIP.AUTO-MCNC: 84 MG/DL (ref 65–117)
GLUCOSE BLD STRIP.AUTO-MCNC: 98 MG/DL (ref 65–117)
GLUCOSE BLD STRIP.AUTO-MCNC: >600 MG/DL (ref 65–117)
GLUCOSE SERPL-MCNC: 110 MG/DL (ref 65–100)
GLUCOSE SERPL-MCNC: 119 MG/DL (ref 65–100)
GLUCOSE SERPL-MCNC: 358 MG/DL (ref 65–100)
GLUCOSE SERPL-MCNC: 604 MG/DL (ref 65–100)
GLUCOSE SERPL-MCNC: 979 MG/DL (ref 65–100)
GLUCOSE UR STRIP.AUTO-MCNC: >1000 MG/DL
GLUCOSE, GLC: 104 MG/DL
GLUCOSE, GLC: 107 MG/DL
GLUCOSE, GLC: 117 MG/DL
GLUCOSE, GLC: 122 MG/DL
GLUCOSE, GLC: 128 MG/DL
GLUCOSE, GLC: 132 MG/DL
GLUCOSE, GLC: 140 MG/DL
GLUCOSE, GLC: 159 MG/DL
GLUCOSE, GLC: 166 MG/DL
GLUCOSE, GLC: 222 MG/DL
GLUCOSE, GLC: 238 MG/DL
GLUCOSE, GLC: 341 MG/DL
GLUCOSE, GLC: 346 MG/DL
GLUCOSE, GLC: 403 MG/DL
GLUCOSE, GLC: 479 MG/DL
GLUCOSE, GLC: 558 MG/DL
GLUCOSE, GLC: 587 MG/DL
GLUCOSE, GLC: 601 MG/DL
GLUCOSE, GLC: 84 MG/DL
GLUCOSE, GLC: 98 MG/DL
HCO3 BLDA-SCNC: 16 MMOL/L
HCO3 BLDV-SCNC: 10.9 MMOL/L (ref 23–28)
HCT VFR BLD AUTO: 27.2 % (ref 36.6–50.3)
HGB BLD-MCNC: 8.6 G/DL (ref 12.1–17)
HGB UR QL STRIP: ABNORMAL
HIGH TARGET, HITG: 250 MG/DL
IMM GRANULOCYTES # BLD AUTO: 0 K/UL (ref 0–0.04)
IMM GRANULOCYTES NFR BLD AUTO: 0 % (ref 0–0.5)
INSULIN ADMINSTERED, INSADM: 0.5 UNITS/HOUR
INSULIN ADMINSTERED, INSADM: 0.6 UNITS/HOUR
INSULIN ADMINSTERED, INSADM: 1.2 UNITS/HOUR
INSULIN ADMINSTERED, INSADM: 1.5 UNITS/HOUR
INSULIN ADMINSTERED, INSADM: 1.7 UNITS/HOUR
INSULIN ADMINSTERED, INSADM: 14.8 UNITS/HOUR
INSULIN ADMINSTERED, INSADM: 2 UNITS/HOUR
INSULIN ADMINSTERED, INSADM: 2.2 UNITS/HOUR
INSULIN ADMINSTERED, INSADM: 2.2 UNITS/HOUR
INSULIN ADMINSTERED, INSADM: 22.7 UNITS/HOUR
INSULIN ADMINSTERED, INSADM: 24.9 UNITS/HOUR
INSULIN ADMINSTERED, INSADM: 32.5 UNITS/HOUR
INSULIN ADMINSTERED, INSADM: 36.5 UNITS/HOUR
INSULIN ADMINSTERED, INSADM: 37.9 UNITS/HOUR
INSULIN ADMINSTERED, INSADM: 4.2 UNITS/HOUR
INSULIN ADMINSTERED, INSADM: 40 UNITS/HOUR
INSULIN ADMINSTERED, INSADM: 41.2 UNITS/HOUR
INSULIN ADMINSTERED, INSADM: 43.3 UNITS/HOUR
INSULIN ADMINSTERED, INSADM: 46.1 UNITS/HOUR
INSULIN ADMINSTERED, INSADM: 47.4 UNITS/HOUR
INSULIN ADMINSTERED, INSADM: 49.8 UNITS/HOUR
INSULIN ADMINSTERED, INSADM: 7.6 UNITS/HOUR
INSULIN ORDER, INSORD: 0.5 UNITS/HOUR
INSULIN ORDER, INSORD: 0.6 UNITS/HOUR
INSULIN ORDER, INSORD: 1.2 UNITS/HOUR
INSULIN ORDER, INSORD: 1.5 UNITS/HOUR
INSULIN ORDER, INSORD: 1.7 UNITS/HOUR
INSULIN ORDER, INSORD: 14.8 UNITS/HOUR
INSULIN ORDER, INSORD: 2 UNITS/HOUR
INSULIN ORDER, INSORD: 2.2 UNITS/HOUR
INSULIN ORDER, INSORD: 2.2 UNITS/HOUR
INSULIN ORDER, INSORD: 22.7 UNITS/HOUR
INSULIN ORDER, INSORD: 24.9 UNITS/HOUR
INSULIN ORDER, INSORD: 32.5 UNITS/HOUR
INSULIN ORDER, INSORD: 36.5 UNITS/HOUR
INSULIN ORDER, INSORD: 37.9 UNITS/HOUR
INSULIN ORDER, INSORD: 4.2 UNITS/HOUR
INSULIN ORDER, INSORD: 40 UNITS/HOUR
INSULIN ORDER, INSORD: 41.2 UNITS/HOUR
INSULIN ORDER, INSORD: 43.3 UNITS/HOUR
INSULIN ORDER, INSORD: 46.1 UNITS/HOUR
INSULIN ORDER, INSORD: 47.4 UNITS/HOUR
INSULIN ORDER, INSORD: 49.8 UNITS/HOUR
INSULIN ORDER, INSORD: 7.6 UNITS/HOUR
KETONES UR QL STRIP.AUTO: >80 MG/DL
LACTATE BLD-SCNC: 3.45 MMOL/L (ref 0.4–2)
LEUKOCYTE ESTERASE UR QL STRIP.AUTO: NEGATIVE
LOW TARGET, LOT: 150 MG/DL
LYMPHOCYTES # BLD: 1.9 K/UL (ref 0.8–3.5)
LYMPHOCYTES NFR BLD: 13 % (ref 12–49)
MAGNESIUM SERPL-MCNC: 2.2 MG/DL (ref 1.6–2.4)
MAGNESIUM SERPL-MCNC: 2.2 MG/DL (ref 1.6–2.4)
MAGNESIUM SERPL-MCNC: 2.4 MG/DL (ref 1.6–2.4)
MAGNESIUM SERPL-MCNC: 2.6 MG/DL (ref 1.6–2.4)
MAGNESIUM SERPL-MCNC: 2.8 MG/DL (ref 1.6–2.4)
MCH RBC QN AUTO: 29.3 PG (ref 26–34)
MCHC RBC AUTO-ENTMCNC: 31.6 G/DL (ref 30–36.5)
MCV RBC AUTO: 92.5 FL (ref 80–99)
METAMYELOCYTES NFR BLD MANUAL: 1 %
METHADONE UR QL: NEGATIVE
MINUTES UNTIL NEXT BG, NBG: 60 MIN
MONOCYTES # BLD: 0.6 K/UL (ref 0–1)
MONOCYTES NFR BLD: 4 % (ref 5–13)
MULTIPLIER, MUL: 0.01
MULTIPLIER, MUL: 0.01
MULTIPLIER, MUL: 0.02
MULTIPLIER, MUL: 0.03
MULTIPLIER, MUL: 0.04
MULTIPLIER, MUL: 0.05
MULTIPLIER, MUL: 0.06
MULTIPLIER, MUL: 0.06
MULTIPLIER, MUL: 0.07
MULTIPLIER, MUL: 0.07
MULTIPLIER, MUL: 0.08
MULTIPLIER, MUL: 0.09
MULTIPLIER, MUL: 0.09
MULTIPLIER, MUL: 0.1
MULTIPLIER, MUL: 0.11
MULTIPLIER, MUL: 0.11
MULTIPLIER, MUL: 0.12
MULTIPLIER, MUL: 0.13
MULTIPLIER, MUL: 0.14
MYELOCYTES NFR BLD MANUAL: 2 %
NEUTS SEG # BLD: 11.9 K/UL (ref 1.8–8)
NEUTS SEG NFR BLD: 80 % (ref 32–75)
NITRITE UR QL STRIP.AUTO: NEGATIVE
NRBC # BLD: 0 K/UL (ref 0–0.01)
NRBC BLD-RTO: 0 PER 100 WBC
OPIATES UR QL: NEGATIVE
ORDER INITIALS, ORDINIT: NORMAL
P-R INTERVAL, ECG05: 178 MS
PCO2 BLDV: 24.2 MMHG (ref 41–51)
PCO2 BLDV: 30.5 MMHG (ref 41–51)
PCP UR QL: NEGATIVE
PH BLDV: 7.26 [PH] (ref 7.32–7.42)
PH BLDV: 7.32 [PH] (ref 7.32–7.42)
PH UR STRIP: 6 [PH] (ref 5–8)
PHOSPHATE SERPL-MCNC: 7.6 MG/DL (ref 2.6–4.7)
PLATELET # BLD AUTO: 460 K/UL (ref 150–400)
PMV BLD AUTO: 10.6 FL (ref 8.9–12.9)
PO2 BLDV: 35 MMHG (ref 25–40)
PO2 BLDV: 47 MMHG (ref 25–40)
POTASSIUM BLD-SCNC: 3.1 MMOL/L (ref 3.5–5.5)
POTASSIUM SERPL-SCNC: 3.1 MMOL/L (ref 3.5–5.1)
POTASSIUM SERPL-SCNC: 3.5 MMOL/L (ref 3.5–5.1)
POTASSIUM SERPL-SCNC: 3.8 MMOL/L (ref 3.5–5.1)
POTASSIUM SERPL-SCNC: 3.9 MMOL/L (ref 3.5–5.1)
POTASSIUM SERPL-SCNC: 4.1 MMOL/L (ref 3.5–5.1)
PROCALCITONIN SERPL-MCNC: 2.27 NG/ML
PROCALCITONIN SERPL-MCNC: 3.4 NG/ML
PROT UR STRIP-MCNC: 100 MG/DL
Q-T INTERVAL, ECG07: 500 MS
QRS DURATION, ECG06: 128 MS
QTC CALCULATION (BEZET), ECG08: 555 MS
RBC # BLD AUTO: 2.94 M/UL (ref 4.1–5.7)
RBC #/AREA URNS HPF: ABNORMAL /HPF (ref 0–5)
RBC MORPH BLD: ABNORMAL
SAMPLES BEING HELD,HOLD: NORMAL
SAO2 % BLDV: 77.2 % (ref 65–88)
SERVICE CMNT-IMP: ABNORMAL
SERVICE CMNT-IMP: NORMAL
SODIUM BLD-SCNC: 135 MMOL/L (ref 136–145)
SODIUM SERPL-SCNC: 128 MMOL/L (ref 136–145)
SODIUM SERPL-SCNC: 134 MMOL/L (ref 136–145)
SODIUM SERPL-SCNC: 134 MMOL/L (ref 136–145)
SODIUM SERPL-SCNC: 135 MMOL/L (ref 136–145)
SODIUM SERPL-SCNC: 137 MMOL/L (ref 136–145)
SP GR UR REFRACTOMETRY: 1.02 (ref 1–1.03)
SPECIMEN SITE: ABNORMAL
SPECIMEN TYPE: ABNORMAL
TSH SERPL DL<=0.05 MIU/L-ACNC: 7.99 UIU/ML (ref 0.36–3.74)
UA: UC IF INDICATED,UAUC: ABNORMAL
UROBILINOGEN UR QL STRIP.AUTO: 0.2 EU/DL (ref 0.2–1)
VENTRICULAR RATE, ECG03: 74 BPM
WBC # BLD AUTO: 14.9 K/UL (ref 4.1–11.1)
WBC URNS QL MICRO: ABNORMAL /HPF (ref 0–4)

## 2022-05-09 PROCEDURE — 77030040361 HC SLV COMPR DVT MDII -B

## 2022-05-09 PROCEDURE — 82962 GLUCOSE BLOOD TEST: CPT

## 2022-05-09 PROCEDURE — 82533 TOTAL CORTISOL: CPT

## 2022-05-09 PROCEDURE — 74011000250 HC RX REV CODE- 250: Performed by: NURSE PRACTITIONER

## 2022-05-09 PROCEDURE — 76770 US EXAM ABDO BACK WALL COMP: CPT

## 2022-05-09 PROCEDURE — 82803 BLOOD GASES ANY COMBINATION: CPT

## 2022-05-09 PROCEDURE — 74011250636 HC RX REV CODE- 250/636: Performed by: NURSE PRACTITIONER

## 2022-05-09 PROCEDURE — 87077 CULTURE AEROBIC IDENTIFY: CPT

## 2022-05-09 PROCEDURE — 80048 BASIC METABOLIC PNL TOTAL CA: CPT

## 2022-05-09 PROCEDURE — 83735 ASSAY OF MAGNESIUM: CPT

## 2022-05-09 PROCEDURE — 84100 ASSAY OF PHOSPHORUS: CPT

## 2022-05-09 PROCEDURE — 84443 ASSAY THYROID STIM HORMONE: CPT

## 2022-05-09 PROCEDURE — 74011250636 HC RX REV CODE- 250/636: Performed by: INTERNAL MEDICINE

## 2022-05-09 PROCEDURE — 87040 BLOOD CULTURE FOR BACTERIA: CPT

## 2022-05-09 PROCEDURE — 74011000258 HC RX REV CODE- 258: Performed by: EMERGENCY MEDICINE

## 2022-05-09 PROCEDURE — 74011250637 HC RX REV CODE- 250/637: Performed by: NURSE PRACTITIONER

## 2022-05-09 PROCEDURE — 82947 ASSAY GLUCOSE BLOOD QUANT: CPT

## 2022-05-09 PROCEDURE — 84145 PROCALCITONIN (PCT): CPT

## 2022-05-09 PROCEDURE — 74011636637 HC RX REV CODE- 636/637: Performed by: EMERGENCY MEDICINE

## 2022-05-09 PROCEDURE — 36415 COLL VENOUS BLD VENIPUNCTURE: CPT

## 2022-05-09 PROCEDURE — 99233 SBSQ HOSP IP/OBS HIGH 50: CPT | Performed by: CLINICAL NURSE SPECIALIST

## 2022-05-09 PROCEDURE — 80307 DRUG TEST PRSMV CHEM ANLYZR: CPT

## 2022-05-09 PROCEDURE — 65620000000 HC RM CCU GENERAL

## 2022-05-09 PROCEDURE — 87086 URINE CULTURE/COLONY COUNT: CPT

## 2022-05-09 PROCEDURE — 87186 SC STD MICRODIL/AGAR DIL: CPT

## 2022-05-09 PROCEDURE — 74011000250 HC RX REV CODE- 250: Performed by: INTERNAL MEDICINE

## 2022-05-09 PROCEDURE — 74011000258 HC RX REV CODE- 258: Performed by: INTERNAL MEDICINE

## 2022-05-09 PROCEDURE — 85025 COMPLETE CBC W/AUTO DIFF WBC: CPT

## 2022-05-09 RX ORDER — POTASSIUM CHLORIDE 20 MEQ/1
40 TABLET, EXTENDED RELEASE ORAL
Status: COMPLETED | OUTPATIENT
Start: 2022-05-09 | End: 2022-05-09

## 2022-05-09 RX ORDER — POTASSIUM CHLORIDE 14.9 MG/ML
10 INJECTION INTRAVENOUS
Status: DISCONTINUED | OUTPATIENT
Start: 2022-05-09 | End: 2022-05-09 | Stop reason: CLARIF

## 2022-05-09 RX ORDER — HEPARIN SODIUM 5000 [USP'U]/ML
5000 INJECTION, SOLUTION INTRAVENOUS; SUBCUTANEOUS EVERY 8 HOURS
Status: DISCONTINUED | OUTPATIENT
Start: 2022-05-09 | End: 2022-05-14 | Stop reason: HOSPADM

## 2022-05-09 RX ORDER — POTASSIUM CHLORIDE 7.45 MG/ML
10 INJECTION INTRAVENOUS
Status: COMPLETED | OUTPATIENT
Start: 2022-05-09 | End: 2022-05-09

## 2022-05-09 RX ORDER — DEXTROSE MONOHYDRATE AND SODIUM CHLORIDE 5; .45 G/100ML; G/100ML
100 INJECTION, SOLUTION INTRAVENOUS CONTINUOUS
Status: DISCONTINUED | OUTPATIENT
Start: 2022-05-09 | End: 2022-05-10

## 2022-05-09 RX ADMIN — POTASSIUM CHLORIDE 10 MEQ: 7.46 INJECTION, SOLUTION INTRAVENOUS at 14:46

## 2022-05-09 RX ADMIN — Medication 46.1 UNITS/HR: at 06:01

## 2022-05-09 RX ADMIN — Medication 22.7 UNITS/HR: at 10:53

## 2022-05-09 RX ADMIN — ACETAMINOPHEN 325MG 650 MG: 325 TABLET ORAL at 20:46

## 2022-05-09 RX ADMIN — Medication 41.2 UNITS/HR: at 07:52

## 2022-05-09 RX ADMIN — SODIUM CHLORIDE, POTASSIUM CHLORIDE, SODIUM LACTATE AND CALCIUM CHLORIDE 1000 ML: 600; 310; 30; 20 INJECTION, SOLUTION INTRAVENOUS at 02:12

## 2022-05-09 RX ADMIN — POTASSIUM CHLORIDE 40 MEQ: 20 TABLET, EXTENDED RELEASE ORAL at 07:47

## 2022-05-09 RX ADMIN — SODIUM CHLORIDE, PRESERVATIVE FREE 10 ML: 5 INJECTION INTRAVENOUS at 05:07

## 2022-05-09 RX ADMIN — VANCOMYCIN HYDROCHLORIDE 1750 MG: 10 INJECTION, POWDER, LYOPHILIZED, FOR SOLUTION INTRAVENOUS at 00:37

## 2022-05-09 RX ADMIN — HEPARIN SODIUM 5000 UNITS: 5000 INJECTION INTRAVENOUS; SUBCUTANEOUS at 17:47

## 2022-05-09 RX ADMIN — DEXTROSE AND SODIUM CHLORIDE 150 ML/HR: 5; 450 INJECTION, SOLUTION INTRAVENOUS at 09:54

## 2022-05-09 RX ADMIN — PIPERACILLIN AND TAZOBACTAM 3.38 G: 3; .375 INJECTION, POWDER, LYOPHILIZED, FOR SOLUTION INTRAVENOUS at 04:43

## 2022-05-09 RX ADMIN — POTASSIUM CHLORIDE 10 MEQ: 7.46 INJECTION, SOLUTION INTRAVENOUS at 11:19

## 2022-05-09 RX ADMIN — DEXTROSE AND SODIUM CHLORIDE 150 ML/HR: 5; 450 INJECTION, SOLUTION INTRAVENOUS at 16:34

## 2022-05-09 RX ADMIN — Medication 27.1 UNITS/HR: at 00:15

## 2022-05-09 RX ADMIN — HEPARIN SODIUM 5000 UNITS: 5000 INJECTION INTRAVENOUS; SUBCUTANEOUS at 09:20

## 2022-05-09 RX ADMIN — SODIUM CHLORIDE, PRESERVATIVE FREE 10 ML: 5 INJECTION INTRAVENOUS at 22:55

## 2022-05-09 RX ADMIN — DEXTROSE AND SODIUM CHLORIDE 150 ML/HR: 5; 450 INJECTION, SOLUTION INTRAVENOUS at 23:14

## 2022-05-09 RX ADMIN — PIPERACILLIN AND TAZOBACTAM 3.38 G: 3; .375 INJECTION, POWDER, LYOPHILIZED, FOR SOLUTION INTRAVENOUS at 22:52

## 2022-05-09 RX ADMIN — PIPERACILLIN AND TAZOBACTAM 3.38 G: 3; .375 INJECTION, POWDER, LYOPHILIZED, FOR SOLUTION INTRAVENOUS at 13:29

## 2022-05-09 RX ADMIN — POTASSIUM CHLORIDE 10 MEQ: 7.46 INJECTION, SOLUTION INTRAVENOUS at 10:16

## 2022-05-09 RX ADMIN — SODIUM CHLORIDE, PRESERVATIVE FREE 10 ML: 5 INJECTION INTRAVENOUS at 13:29

## 2022-05-09 RX ADMIN — POTASSIUM CHLORIDE 10 MEQ: 7.46 INJECTION, SOLUTION INTRAVENOUS at 12:45

## 2022-05-09 RX ADMIN — Medication 43.3 UNITS/HR: at 03:13

## 2022-05-09 NOTE — PROGRESS NOTES
Participated in CCU IDR where pt was discussed.   Tulio Buckley M.Div, [facility] [id number] [id number])

## 2022-05-09 NOTE — PROGRESS NOTES
Progress Note    Patient: Thania Garnica MRN: [id number]  SSN: [id number]    YOB: 1980  Age: 46 y.o. Sex: male        ADMITTED:  2022 to Teddy Deng MD  for DKA (diabetic [facility] [E11.10]         Thania Garnica was admitted for DKA (diabetic ketoacidosis) ([facility] [address] [E11.10]. ams today at bedside. On insulin gtt. 2900 UOP from hansen. Wbc slightly improved. Creat 3.7 with baseline around 2. Hansen placed yesterday for >1Liter retention with CTpersonally visualized, right hydronephrosis with markedly distended bladder despite hansen. concern for emphysematous cystitis. Treated with replaced hansen and empiric iv abx. Vitals:  Temp (24hrs), Av.8 °F (34.9 °C), Min:87.8 °F (31 °C), Max:98.1 °F (36.7 °C)     Blood pressure 117/70, pulse 85, temperature 97.9 °F (36.6 °C), resp. rate 18, height 5' 9.02\" (1.753 m), weight 70.8 kg (156 lb 1.4 oz), SpO2 98 %. I&O's:  1901 -  07  In: 3055.5 [P.O.:400; I.V.:2605.5]  Out: 6622 [Urine:2915]    07 -  190  In: -   Out: 250 [Urine:250]     Exam:   NAD. abdomen soft, NT  ams  Hansen draining clear yellow UA  On Room air. Aerating well      Labs:   Recent Labs     22  0031 22  1813   WBC 14.9* 16.8*   HGB 8.6* 9.8*   HCT 27.2* 34.6*   * 557*     Recent Labs     22  0838 22  0426 22  0031   * 134* 128*   K 3.1* 3.5 4.1    102 96*   CO2 18* 12* 8*   * 604* 979*   BUN 70* 74* 79*   CREA 3.72* 3.71* 3.60*   CA 8.1* 7.7* 7.7*        Cultures:      Imaging:       Assessment:     - Active Problems:    DKA (diabetic ketoacidosis) ([facility] [address] (2021)    right hydronephrosis - often resolves with hansen. Pending ELICEO later today     Urinary retention - now with hansen     Emphysematous cystitis - treated with hansen and cx directed abx.  On zosyn and vanc    AMS- Type 1 DM with DKA on insulin gtt    Plan:     - empiric abx until able to target to culture directed  - continue hansen long term through discharge. UD as outpatient   -US pending to monitor hydro- no stent indicated at this time asa hydro has resolved with bladder decompression in the past  -monitor renal function, tight glycemic control.  HD support  -will follow     Supervising MD, Dr. Tam Elliott  Signed By: Maicol Key NP - May 9, 2022

## 2022-05-09 NOTE — H&P
[facility]    ICU TEAM Progress Note    Name: Prachi Alberto   : 1980   MRN: [id number]   Date: 2022      Subjective:   Progress Note: 2022      47 y/o with pmh of bipolar disorder, HTN, DM1 presents to [facility] ICU for AMS. Upon arrival HDS, lethargic and disoriented, opens eyes to voice and follows commands. Lab work done and significant for pH 6.95 with a bicarb of 3. Glucose 1335,  (corrected 136), K 7.3, Cr 3.76, WBC 16.8. Initially temp unable to be obtained, core temp taken and showed a temp of 88.9 deg F.  Placed on warming blanket. Given 2 L IVF, calcium, and started on an insulin gtt. ICU consulted for admission. Upon my arrival pt still HDS and lethargic and disoriented. Physical exam significant for lower abdomen distention, rigidity and tenderness. Bladder scan done and showed >999. Beard placed. Will get CT abd to r/o intra abdominal source of infection. Pt will be admitted to the ICU for further management. Called back to bedside bc pt agitated and pulled out one of his IVs,unable to to get IV access. Also Map 62. Upon my assessment pt agitated, somewhat redirectable. I placed a ultrasound guided 20 karissa IV in the rt basilic vein and severo all labs. I accompanied pt and nurse to CT (to r/o intra abdominal source of infection) since BP borderline. Pt then transferred to ICU without complication. Active Problem List:     Problem List  Date Reviewed: 2022          Codes Class    UTI (urinary tract infection) ICD-10-CM: N39.0  ICD-9-CM: 599.0         Coffee ground emesis ICD-10-CM: K92.0  ICD-9-CM: 578.0         COVID-19 ICD-10-CM: U07.1  ICD-9-CM: 079.89         DKA (diabetic ketoacidosis) ([facility] ICD-10-CM: E11.10  ICD-9-CM: 250.12         Hyperkalemia ICD-10-CM: E87.5  ICD-9-CM: 276.7         Hypothermia ICD-10-CM: T68. XXXA  ICD-9-CM: 991.6         Acidosis, metabolic OLR-58-GP: C58.2  ICD-9-CM: 276.2         DKA, type 1, not at goal [facility]) ICD-10-CM: E10.10  ICD-9-CM: 250.13         FELECIA (acute kidney injury) ([facility] ICD-10-CM: N17.9  ICD-9-CM: 584.9         Hydronephrosis of right kidney ICD-10-CM: N13.30  ICD-9-CM: 559         DKA (diabetic ketoacidoses) ICD-10-CM: E11.10  ICD-9-CM: 250.12         DKA, type 1 ([facility] ICD-10-CM: E10.10  ICD-9-CM: 250.13         Neuropathy ICD-10-CM: G62.9  ICD-9-CM: 355.9         Upper GI bleed ICD-10-CM: K92.2  ICD-9-CM: 578.9         Diabetic neuropathy, type I diabetes mellitus ([facility] ICD-10-CM: E10.40  ICD-9-CM: 250.61, 357.2         Tobacco abuse (Chronic) ICD-10-CM: Z72.0  ICD-9-CM: 305.1 Chronic        HTN (hypertension) ICD-10-CM: I10  ICD-9-CM: 401.9               Past Medical History:      has a past medical history of Bipolar 1 disorder, depressed ([facility] Bipolar disorder ([facility] Depression, Diabetes ([facility] DKA, type 1 ([facility] (1/27/2013), H/O noncompliance with medical treatment, presenting hazards to health, MRSA (methicillin resistant staph aureus) culture positive, MRSA (methicillin resistant Staphylococcus aureus), Noncompliance with medication regimen, Second hand smoke exposure, Seizure ([facility] and Seizures ([facility] (2006 or 2007). Past Surgical History:      has a past surgical history that includes hx orthopaedic (Left); hx heent; and upper gi endoscopy,biopsy (11/20/2018). Home Medications:     Prior to Admission medications    Medication Sig Start Date End Date Taking? Authorizing Provider   insulin aspart U-100 (NovoLOG Flexpen U-100 Insulin) 100 unit/mL (3 mL) inpn 2 Units by SubCUTAneous route Before breakfast, lunch, and dinner. 3/12/22   Sunny Ramos MD   ondansetron (ZOFRAN ODT) 8 mg disintegrating tablet Take 1 Tablet by mouth every eight (8) hours as needed for Nausea or Vomiting. 3/3/22   Day Bates,    naloxone (Narcan) 4 mg/actuation nasal spray Use 1 spray intranasally, then discard. Repeat with new spray every 2 min as needed for opioid overdose symptoms, alternating nostrils.  2/23/22 Stone Smith MD   pen needle, diabetic 30 gauge x 3/16\" ndle 5 Units by Does Not Apply route Before breakfast, lunch, and dinner. 22   Stone Smith MD   amLODIPine (NORVASC) 5 mg tablet Take 1 Tablet by mouth daily. 2/15/22   Josh Guillory MD   carvediloL (COREG) 12.5 mg tablet Take 1 Tablet by mouth two (2) times daily (with meals). 22   Josh Guillory MD   finasteride (PROSCAR) 5 mg tablet Take 1 Tablet by mouth daily. 2/15/22   Josh Guillory MD   rosuvastatin (CRESTOR) 40 mg tablet Take 1 Tablet by mouth nightly. 22   Stone Smith MD   ondansetron (ZOFRAN ODT) 4 mg disintegrating tablet Take 1 Tablet by mouth every eight (8) hours as needed for Nausea or Vomiting. 21   Shane Goodman MD   DULoxetine (CYMBALTA) 60 mg capsule Take 1 Capsule by mouth daily. 21   Stone Smith MD   pantoprazole (Protonix) 40 mg tablet Take 1 Tablet by mouth daily. 21   Rishabh Wayne MD   ergocalciferol (ERGOCALCIFEROL) 1,250 mcg (50,000 unit) capsule Take 1 capsule by mouth once a week 10/30/21   Stone Smith MD   pregabalin (Lyrica) 75 mg capsule Take 75 mg by mouth two (2) times a day. Provider, Historical       Allergies/Social/Family History:     No Known Allergies   Social History     Tobacco Use    Smoking status: Former Smoker     Packs/day: 0.10     Years: 16.00     Pack years: 1.60     Types: Cigarettes     Start date: 10/4/1999     Quit date: 2020     Years since quittin.2    Smokeless tobacco: Never Used   Substance Use Topics    Alcohol use: No      Family History   Problem Relation Age of Onset    Diabetes Mother     Diabetes Other         neice, type 1        Review of Systems:     Review of systems not obtained due to patient factors.     Objective:   Vital Signs:  Visit Vitals  /72 (BP 1 Location: Right upper arm, BP Patient Position: At rest)   Pulse 61   Temp (!) 88.9 °F (31.6 °C)   Resp (!) 32    5' 9.02\" (1.753 m)   Wt 70.8 kg (156 lb 1.4 oz)   SpO2 100%   BMI 23.04 kg/m²      O2 Device: None (Room air) Temp (24hrs), Av.9 °F (34.9 °C), Min:88.9 °F (31.6 °C), Max:97.9 °F (36.6 °C)           Intake/Output:     Intake/Output Summary (Last 24 hours) at 2022  Last data filed at 2022  Gross per 24 hour   Intake 1000 ml   Output --   Net 1000 ml       Physical Exam:    General:  disoriented  Eye:  conjunctivae/corneas clear. PERRL,  Neurologic:  no focal deficits  Lymphatic:  Cervical, supraclavicular, and axillary nodes normal.   Neck:  normal and no erythema or exudates noted. Lungs:  clear to auscultation bilaterally  Heart:  regular rate and rhythm, S1, S2 normal, no murmur, click, rub or gallop  Abdomen:  Lower abdomen firm, distended, tender  Cardiovascular:  Regular rate and rhythm, S1S2 present, without murmur or extra heart sounds, pedal pulses normal and no edema  Skin:  Normal.    LABS AND  DATA: Personally reviewed  Recent Labs     22   WBC 16.8*   HGB 9.8*   HCT 34.6*   *     Recent Labs     22   *   K 7.3*   CL 84*   CO2 <5*   BUN 78*   CREA 3.76*   GLU 1,335*   CA 8.3*     Recent Labs     22   *   TP 6.4   ALB 1.4*   GLOB 5.0*     No results for input(s): INR, PTP, APTT, INREXT in the last 72 hours. No results for input(s): PHI, PCO2I, PO2I, FIO2I in the last 72 hours. No results for input(s): CPK, CKMB, TROIQ, BNPP in the last 72 hours.     Hemodynamics:   PAP:   CO:     Wedge:   CI:     CVP:    SVR:       PVR:       Ventilator Settings:  Mode Rate Tidal Volume Pressure FiO2 PEEP                    Peak airway pressure:      Minute ventilation:          MEDS: Reviewed    Chest X-Ray: personally reviewed and report checked      Assessment and Plan:     Discussed Plan of Care (goals of care): No,  Pt too altered to participate in [address] discussions and unable to contact NOK  Addressed Code Status: Full Code  NOK: Khadijah Hill (mother)- unable to contact bc number not in service    DKA  -glucose 1335, + ketones in urine, bicarb <5, anion gap too high to calculate  -insulin gtt per DKA protocol  -s/p 2 L NS in ED, given additional L NS then start NS at 150/hr  -give 2 amps Bicarb for bicarb <5  -BMP and VBG Q 4 hour  -when BS is <250 and if AG still >12 will switch IVF to D5 1/2 NS  -when AG < 12 and glucose <200 start Lantus and sliding scale and dc insulin gtt 2 hours after Lantus administration    Acute metabolic encephalopathy due to DKA  -currently protecting airway, still alert and following commands but disoriented with intermittent agitation  -treat DKA as above  -send UTox to r/o drug ingestion    Pseudohyponatremia  -NA upon admission 116; however, glucose 1335, corrected Na is 136    FELECIA on CKD 3a with severe hyperkalemia, pre-renal in the setting of dehydration vs post renal due to urinary retention  -K 7.3  -given calcium, bicarb. 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 Kimmie Gandara who recommended up sizing hansen since pt still retaining despite hansen. He will come see pt.   -NPO for possible procedure Hypothermia (T 88.9)  -likely due to sepsis and will treat with zosyn and vanc as above  -send TSH and cortisol  -warming blanket to warm to normothermia  -temp sensing hansen for accurate core temp    PMH of HTN  -will hold antihypertensives bc pt's BP on the softer side    T/L/D  Tubes: None  Lines: Peripheral IV  Drains: Hansen Catheter    SPECIAL EQUIPMENT  None    DISPOSITION  Stay in ICU    CRITICAL CARE CONSULTANT NOTE  I had a face to face encounter with the patient, reviewed and interpreted patient data including clinical events, labs, images, vital signs, I/O's, and examined patient. I have discussed the case and the plan and management of the patient's care with the consulting services, the bedside nurses and the respiratory therapist.      NOTE OF PERSONAL INVOLVEMENT IN CARE   This patient has a high probability of imminent, clinically significant deterioration, which requires the highest level of preparedness to intervene urgently. I participated in the decision-making and personally managed or directed the management of the following life and organ supporting interventions that required my frequent assessment to treat or prevent imminent deterioration. I personally spent 80 minutes of critical care time. This is time spent at this critically ill patient's bedside actively involved in patient care as well as the coordination of care and discussions with the patient's family. This does not include any procedural time which has been billed separately.     Philip Ho NP  [facility] [place] [facility]  5/8/2022

## 2022-05-09 NOTE — ED NOTES
Report called to [facility] for icu admit. Pt has poor peripheral access. 1 working iv, unable to obtain blood cultures and additional labs that were ordered. Will obtain in icu.

## 2022-05-09 NOTE — PROGRESS NOTES
[facility]    ICU TEAM Progress Note    Name: Thania Garnica   : 1980   MRN: [id number]   Date: 2022           ICU Assessment & Plan of Emely Ray Is a 46 y.o. male with bipolar disorder, HTN, DM1 admitted  with DKA. NEURO  #. Pain, agitation, delirium  - tylenol prn      CARDIAC  No acute      RESPIRATORY  No acute      RENAL  #. FELECIA  #. Emphysematous cystitis, R hydro  - Urology consulted; greatly appreciate assistance  - repeat CT pending  - IVFs for resuscitation and goal positive daily for now  - e- repletion prn      GASTROINTESTINAL  #. At risk for malnutrition  - NPO until gap closed/transitioned  - Nutrition consult      HEMATOLOGIC  #. Anemia, likely AoCD  - no indication for transfusion at this time; monitor      ID  #. Leukocytosis, elevated procal  - empiric vanc, zosyn pending cx data      ENDOCRINE  #. T1DM with DKA  - DKA protocol, currently on NS + insulin gtt  - gap closing, not yet req'g D5      DVT Prophylaxis: SCD's, heparin  U - Ulcer Prophylaxis: not indicated  G - Glycemic Control: Insulin  B - Bowel Regimen: miralax prn  Tubes: None  Lines: Peripheral IV  Drains: None    Subjective:   Progress Note: 2022      Reason for ICU Admission: DKA     HPI: (per prior clinicians) 47 y/o with pmh of bipolar disorder, HTN, DM1 presents to [facility] ICU for AMS. Upon arrival HDS, lethargic and disoriented, opens eyes to voice and follows commands. Lab work done and significant for pH 6.95 with a bicarb of 3. Glucose 1335,  (corrected 136), K 7.3, Cr 3.76, WBC 16.8. Initially temp unable to be obtained, core temp taken and showed a temp of 88.9 deg F.  Placed on warming blanket. Given 2 L IVF, calcium, and started on an insulin gtt. ICU consulted for admission.     Upon my arrival pt still HDS and lethargic and disoriented. Physical exam significant for lower abdomen distention, rigidity and tenderness. Bladder scan done and showed >999. Beard placed. Will get CT abd to r/o intra abdominal source of infection. Pt will be admitted to the ICU for further management.     Called back to bedside bc pt agitated and pulled out one of his IVs,unable to to get IV access. Also Map 62. Upon my assessment pt agitated, somewhat redirectable. I placed a ultrasound guided 20 karissa IV in the rt basilic vein and severo all labs. I accompanied pt and nurse to CT (to r/o intra abdominal source of infection) since BP borderline. Pt then transferred to ICU without complication. Overnight Events:   5/9: Gap decreasing. Home Medications:     Prior to Admission medications    Medication Sig Start Date End Date Taking? Authorizing Provider   insulin aspart U-100 (NovoLOG Flexpen U-100 Insulin) 100 unit/mL (3 mL) inpn 2 Units by SubCUTAneous route Before breakfast, lunch, and dinner. 3/12/22   Margret Galvez MD   ondansetron (ZOFRAN ODT) 8 mg disintegrating tablet Take 1 Tablet by mouth every eight (8) hours as needed for Nausea or Vomiting. 3/3/22   Lowell Chand, DO   naloxone (Narcan) 4 mg/actuation nasal spray Use 1 spray intranasally, then discard. Repeat with new spray every 2 min as needed for opioid overdose symptoms, alternating nostrils. 2/23/22   Talia Nassar MD   pen needle, diabetic 30 gauge x 3/16\" ndle 5 Units by Does Not Apply route Before breakfast, lunch, and dinner. 2/23/22   Talia Nassar MD   amLODIPine (NORVASC) 5 mg tablet Take 1 Tablet by mouth daily. 2/15/22   Gilford Ruths, MD   carvediloL (COREG) 12.5 mg tablet Take 1 Tablet by mouth two (2) times daily (with meals). 2/14/22   Gilford Ruths, MD   finasteride (PROSCAR) 5 mg tablet Take 1 Tablet by mouth daily. 2/15/22   Gilford Ruths, MD   rosuvastatin (CRESTOR) 40 mg tablet Take 1 Tablet by mouth nightly.  1/27/22   Talia Nassar MD   ondansetron (ZOFRAN ODT) 4 mg disintegrating tablet Take 1 Tablet by mouth every eight (8) hours as needed for Nausea or Vomiting. 12/28/21   Esther Shane MD   DULoxetine (CYMBALTA) 60 mg capsule Take 1 Capsule by mouth daily. 12/13/21   James Galicia MD   pantoprazole (Protonix) 40 mg tablet Take 1 Tablet by mouth daily. 12/6/21   Dawn Knowles MD   ergocalciferol (ERGOCALCIFEROL) 1,250 mcg (50,000 unit) capsule Take 1 capsule by mouth once a week 10/30/21   James Galicia MD   pregabalin (Lyrica) 75 mg capsule Take 75 mg by mouth two (2) times a day.     Provider, Historical       Current Meds:     Current Facility-Administered Medications   Medication Dose Route Frequency    Vancomycin Random Level Draw Reminder Note  1 Each Other ONCE    potassium chloride (K-DUR, KLOR-CON M20) SR tablet 40 mEq  40 mEq Oral NOW    insulin regular (NOVOLIN R, HUMULIN R) 100 Units in 0.9% sodium chloride 100 mL infusion  0-50 Units/hr IntraVENous TITRATE    insulin lispro (HUMALOG) injection   SubCUTAneous TIDAC    glucose chewable tablet 16 g  4 Tablet Oral PRN    dextrose 10% infusion 0-250 mL  0-250 mL IntraVENous PRN    glucagon (GLUCAGEN) injection 1 mg  1 mg IntraMUSCular PRN    0.9% sodium chloride infusion  150 mL/hr IntraVENous CONTINUOUS    lidocaine (URO-JET/ GLYDO) 2 % jelly   Urethral NOW    sodium chloride (NS) flush 5-40 mL  5-40 mL IntraVENous Q8H    sodium chloride (NS) flush 5-40 mL  5-40 mL IntraVENous PRN    acetaminophen (TYLENOL) tablet 650 mg  650 mg Oral Q6H PRN    Or    acetaminophen (TYLENOL) suppository 650 mg  650 mg Rectal Q6H PRN    polyethylene glycol (MIRALAX) packet 17 g  17 g Oral DAILY PRN    ondansetron (ZOFRAN ODT) tablet 4 mg  4 mg Oral Q8H PRN    Or    ondansetron (ZOFRAN) injection 4 mg  4 mg IntraVENous Q6H PRN    enoxaparin (LOVENOX) injection 30 mg  30 mg SubCUTAneous DAILY    piperacillin-tazobactam (ZOSYN) 3.375 g in 0.9% sodium chloride (MBP/ADV) 100 mL MBP  3.375 g IntraVENous Q8H    VANCOMYCIN INFORMATION NOTE   Other Rx Dosing/Monitoring    lidocaine (URO-JET/ GLYDO) 2 % jelly           Objective:   Vital Signs:  Visit Vitals  /61   Pulse 86   Temp 97.3 °F (36.3 °C)   Resp 17   Ht 5' 9.02\" (1.753 m)   Wt 70.8 kg (156 lb 1.4 oz)   SpO2 97%   BMI 23.04 kg/m²      O2 Device: None (Room air) Temp (24hrs), Av.6 °F (34.8 °C), Min:87.8 °F (31 °C), Max:98.1 °F (36.7 °C)           Intake/Output:     Intake/Output Summary (Last 24 hours) at 2022 0739  Last data filed at 2022 0656  Gross per 24 hour   Intake 1450 ml   Output 2915 ml   Net -1465 ml       Physical Exam:  Sleeping, awakens to voice, drowsy  Resps even and unlabored, symmetric chest rise  Reg rate, no heave/rub  Peripheral pulses intact  Abd soft, nontender  Skin warm, dry; multiple tattoos throughout  LAKHANI      LABS AND  DATA: Personally reviewed  Recent Labs     22  0031 22  1813   WBC 14.9* 16.8*   HGB 8.6* 9.8*   HCT 27.2* 34.6*   * 557*     Recent Labs     22  0426 22  0031   * 128*   K 3.5 4.1    96*   CO2 12* 8*   BUN 74* 79*   CREA 3.71* 3.60*   * 979*   CA 7.7* 7.7*   MG 2.6* 2.8*   PHOS  --  7.6*     Recent Labs     22  1813   *   TP 6.4   ALB 1.4*   GLOB 5.0*     No results for input(s): INR, PTP, APTT, INREXT in the last 72 hours. No results for input(s): PHI, PCO2I, PO2I, FIO2I in the last 72 hours. No results for input(s): CPK, CKMB, TROIQ, BNPP in the last 72 hours. MEDS: Reviewed    Chest X-Ray:  CXR Results  (Last 48 hours)               22  XR CHEST PORT Final result    Impression:  No evidence of acute cardiopulmonary process. Narrative:  INDICATION: Sepsis       COMPARISON: 3/8/2022       FINDINGS: AP portable imaging of the chest performed at 8:10 PM demonstrates a   stable cardiomediastinal silhouette. The lungs are clear bilaterally. No   significant osseous abnormalities are seen.                Multidisciplinary Rounds Completed:  Pending    ABCDEF Bundle/Checklist Completed:  Yes    SPECIAL EQUIPMENT  None    DISPOSITION  Stay in ICU    CRITICAL CARE CONSULTANT NOTE  I had a face to face encounter with the patient, reviewed and interpreted patient data including clinical events, labs, images, vital signs, I/O's, and examined patient. I have discussed the case and the plan and management of the patient's care with the consulting services, the bedside nurses and the respiratory therapist.      NOTE OF PERSONAL INVOLVEMENT IN CARE   This patient has a high probability of imminent, clinically significant deterioration, which requires the highest level of preparedness to intervene urgently. I participated in the decision-making and personally managed or directed the management of the following life and organ supporting interventions that required my frequent assessment to treat or prevent imminent deterioration. I personally spent 35 minutes of critical care time. This is time spent at this critically ill patient's bedside actively involved in patient care as well as the coordination of care. This does not include any procedural time which has been billed separately.     Ariel Woodall MD  Intensivist  5/9/2022

## 2022-05-09 NOTE — PROGRESS NOTES
Physical Therapy Screening:  Services are not indicated at this time. [facility] for [facility] therapy based on results obtained during the nursing admission assessment. The patients chart was reviewed and the patient is not appropriate for a skilled therapy evaluation at this time. Please consult physical therapy if any therapy needs arise. Thank you.     Jimmy Fagan

## 2022-05-09 NOTE — ED NOTES
Pts bp 99/48 map 62. Critical care np Twyla notified and to bedside to assess pt and start 2nd iv access prior to transport to ct. Pt remains altered, cool to touch, bare hugger in place for warming measures.  This nurse remains at bedside 1:1 with this critical pt. 3rd liter NS bolus infusing at this time

## 2022-05-09 NOTE — DIABETES MGMT
[address] [facility]    CLINICAL NURSE SPECIALIST CONSULT     Initial Presentation   Inetta Shone is a 46 y.o. male admitted from the ER with altered mental status. Found to have >999 cc in bladder => Beard placed. LAB: BG 1335 with incalculable AG. Urinary glucose & ketone +. WBC 16.8. Hyponatremic. K 7.3. FELECIA. CXR: No acute process noted  CT ABd: Markedly distended urinary bladder and moderately severe right-sided hydronephrosis despite the presence of a Beard catheter; correlate with Beard catheter function. Air throughout the urinary bladder wall is suspicious for emphysematous cystitis given the clinical finding of sepsis. HX:   Past Medical History:   Diagnosis Date    Bipolar 1 disorder, depressed ([address]     Bipolar disorder ([address]     Depression     Diabetes ([address]     DKA, type 1 ([address] 1/27/2013    diagnosed age 23    H/O noncompliance with medical treatment, presenting hazards to health     MRSA (methicillin resistant staph aureus) culture positive     MRSA (methicillin resistant Staphylococcus aureus)     Face    Noncompliance with medication regimen     Second hand smoke exposure     Seizure ([address]     Seizures ([address] 2006 or 2007    one episode during care home      INITIAL DX:   DKA (diabetic ketoacidosis) ([address] [E11.10]     Current Treatment     TX: Insulin. IVF. Heparin. ABx. Consulted by Provider for advanced diabetes nursing assessment and care for:   [x] Transitioning off Serafin Bunting   [x] Inpatient management strategy  [x] Home management assessment  [] Survival skill education    Hospital Course   Clinical progress has been complicated by need for ICU level of care. Diabetes History   Per EMR, patient has Type 1 diabetes since age 23, and positive family history in mother and niece. Admission BG 1335. A1c 11.4% (3/8/22).      Diabetes-related Medical History  Acute complications  DKA  Other associated conditions     Depression    Diabetes Medication History  Key Antihyperglycemic Medications             insulin aspart U-100 (NovoLOG Flexpen U-100 Insulin) 100 unit/mL (3 mL) inpn 2 Units by SubCUTAneous route Before breakfast, lunch, and dinner. Diabetes self-management practices: Deferred as patient is not alert    Subjective   Not arousing     Objective   Physical exam  General Normal weight male who is ll-appearing  Neuro  Minimally responsive at this time  Vital Signs   Visit Vitals  BP (!) 153/79   Pulse 89   Temp 97.9 °F (36.6 °C)   Resp 15   Ht 5' 9.02\" (1.753 m)   Wt 70.8 kg (156 lb 1.4 oz)   SpO2 98%   BMI 23.04 kg/m²     Laboratory  Recent Labs     05/09/22  0838 05/09/22  0426 05/09/22  0031 05/08/22  1813 05/08/22  1813   * 604* 979*   < > 1,335*   AGAP 12 20* 24*   < > Cannot be calculated   WBC  --   --  14.9*  --  16.8*   CREA 3.72* 3.71* 3.60*   < > 3.76*   GFRNA 18* 18* 19*   < > 18*   AST  --   --   --   --  17   ALT  --   --   --   --  28    < > = values in this interval not displayed. Factors impacting BG management  Factor Dose Comments   Nutrition:  NPO       Infection Zosyn Q8 hrs Afebrile. WBC elevated   Other:   Kidney function  Liver function   FELECIA  Liver enzymes normal      Blood glucose pattern      Significant diabetes-related events over the past 24-72 hours  Admitted in DKA with BG 1335 and incalculable AG. Fluid resuscitation    Assessment and Plan   Nursing Diagnosis Risk for unstable blood glucose pattern   Nursing Intervention Domain 2992 Decision-making Support   Nursing Interventions Examined current inpatient diabetes/blood glucose control   Explored factors facilitating and impeding inpatient management  Explored corrective strategies with patient and responsible inpatient provider   Informed patient of rational for insulin strategy while hospitalized     Evaluation   This normal weight  male with known Type 1 diabetes was admitted in DKA, as evidenced by BG 1335 & incalculable AG. POC  @10am. Dextrose infusion started. This patient would benefit from diabetes self-management education and support DAVID RAMIREZ [facility]) after discharge. Recommendations     [x] Glucostabilizer until [employer] closes X2 (less than 12). Transition insulin dosing   Use of Subcutaneous Insulin Order set (9928)  Insulin Dosing Specific recommendation   Basal                                      (Based on weight, BMI & GFR) [x]        0.2 units/kg/D  [] 0.3 units/kg/D  [] 0.4 units/kg/D    Nutritional                                      (Based on CHO/dextrose load) [x] Normal sensitivity  [] Insulin-resistant sensitivity    Corrective                                       (Useful in adjusting insulin dosing) [] Normal sensitivity  [x] HIGH sensitivity  [] Insulin-resistant sensitivity      [x] Referral to  [x] [facility] (Phone [phone] to schedule appointment) for [facility]    Billing Code(s)   [x] 07992 IP subsequent hospital care - 35 minutes    Before making these care recommendations, I personally reviewed the hospitalization record, including notes, laboratory & diagnostic data and current medications, and examined the patient at the bedside (circumstances permitting) before making care recommendations. More than fifty (50) percent of the time was spent in patient counseling and/or care coordination.   Total minutes: [address], CNS  Diabetes Clinical Nurse Specialist  [facility]  Access via [facility]

## 2022-05-09 NOTE — PROGRESS NOTES
2200: Pt arrived to room. Not interactive, no command following, responsive to pain and said \"stop\" but nothing meaningful. Placed on [employer]. Hansen draining but slowly. 2300: Urology at bedside, hansen removed and replaced with 20Fr. Immediately began to drain rapidly, total output -1215ml. Plan for [address] in AM to monitor hydronephrosis. Recommendation from Urology not to use temp sensing hansen. 0000: Pt reassessed, no acute changes. Blood sugar still reading HIGH, titrating insulin up accordingly. 0400: Pt reassessed, suddenly alert and oriented though drowsy, able to recall details and past history. Gave water, drank well with no coughing. Felt the urge to urinate, told that he has a urinary catheter. Turning himself in bed, resting well. Updated mothers phone number in chart under home phone, pt states she is privy to any and all medical information. 0700: Report given to           Problem:  Body Temperature -  Risk of, Imbalanced  Goal: *Absence of heat stress or hyperthermia signs and symptoms  Outcome: Progressing Towards Goal  Goal: *Absence of cold stress or hypothermia signs and symptoms  Outcome: Progressing Towards Goal

## 2022-05-09 NOTE — PROGRESS NOTES
Interdisciplinary team rounds were held 5/9/2022 with the following team members:Care Management, Diabetes Treatment Specialist, Nursing, Nutrition, Pharmacy and Physician. Plan of care discussed. See clinical pathway and/or care plan for interventions and desired outcomes. Goals of the Day: replace potassium, monitor insulin drip and labs. 1330 Difficulty obtaining labs to recheck BMP. Patient arms edematous. Specimen obtained and sent to lab. Dr. Ge Mosley notified of difficulty in obtaining specimen. 1830 Gap closed X 2. Discussed with Dr. Ge Mosley. Plan to keep patient on insulin drip overnight and transition in the morning once patient can eat. 1900 End of Shift Note    Bedside shift change report given to Thom Tenorio RN (oncoming nurse) by Darrel Matt RN (offgoing nurse). Report included the following information SBAR, Kardex, Intake/Output, MAR and Accordion    Shift worked:  7am to [facility]     Shift summary and any significant changes:    See above note   Concerns for physician to address: See above note   Zone phone for oncoming shift:  N/a     Activity:  Activity Level: Bed Rest  Number times ambulated in hallways past shift: 0  Number of times OOB to chair past shift: 0    Cardiac:   Cardiac Monitoring: Yes      Cardiac Rhythm: Sinus Rhythm    Access:   Current line(s): PIV     Genitourinary:   Urinary status: hansen    Respiratory:   O2 Device: None (Room air)  Chronic home O2 use?: NO  Incentive spirometer at bedside: NO       GI:  Last Bowel Movement Date:  (pta)  Current diet:  DIET NPO  Passing flatus: YES  Tolerating current diet: NPO       Pain Management:   Patient states pain is manageable on current regimen: YES    Skin:  Corey Score: 12  Interventions: float heels    Patient Safety:  Fall Score:  Total Score: 3  Interventions: bed/chair alarm  High Fall Risk: Yes    Length of Stay:  Expected LOS: - - -  Actual LOS: Pete Meeks, ASAD

## 2022-05-09 NOTE — ED NOTES
Pt feels cool to touch, unable to obtain temp orally or axillary. Rectal temp 88.9, Md notified new orders recd. Warm blankets applied to pt. Sr up x2, bed in low pos.  This nurse remains at bedside due to pts critical status and appears altered, frequent reorientation required as pt attempts to get oob

## 2022-05-09 NOTE — PROGRESS NOTES
Spiritual Care Assessment/Progress Note  [facility]      NAME: Thania Garnica      MRN: [id number]  AGE: 46 y.o. SEX: male  Hoahaoism Affiliation: Mosque   Language: English     5/9/2022     Total Time (in minutes): 5     Spiritual Assessment begun in [facility] CRITICAL CARE 1 through conversation with:         []Patient        [] Family    [] Friend(s)        Reason for Consult: Initial/Spiritual assessment, critical care     Spiritual beliefs: (Please include comment if needed)     [] Identifies with a keerthi tradition:         [] Supported by a keerthi community:            [] Claims no spiritual orientation:           [] Seeking spiritual identity:                [] Adheres to an individual form of spirituality:           [x] Not able to assess:                           Identified resources for coping:      [] Prayer                               [] Music                  [] Guided Imagery     [] Family/friends                 [] Pet visits     [] Devotional reading                         [] Unknown     [] Other:                                              Interventions offered during this visit: (See comments for more details)    Patient Interventions: Initial visit           Plan of Care:     [] Support spiritual and/or cultural needs    [] Support AMD and/or advance care planning process      [] Support grieving process   [] Coordinate Rites and/or Rituals    [] Coordination with community clergy   [] No spiritual needs identified at this time   [] Detailed Plan of Care below (See Comments)  [] Make referral to Music Therapy  [] Make referral to Pet Therapy     [] Make referral to Addiction services  [] Make referral to [facility]  [] Make referral to Spiritual Care Partner  [] No future visits requested        [x] Contact Spiritual Care for further referrals     Comments: Attempted initial spiritual assessment with pt in 2540. Pt sleeping on both attempts. No family present. Contact Spiritual Care for any further referrals.   Diania Landau, M.Div, [facility]   Paging Service [phone])

## 2022-05-09 NOTE — CONSULTS
Urology Consult    Subjective:     Date of Consultation:  May 8, 2022    Referring Physician: Reta Gregorio    Reason for Consultation:  Hydronephrosis, retention, emphysematous cystitis    Patient Name: Stephanie Berman  MRN: [id number]    History of Present Illness:   Stephanie Berman, 46 y.o. male with PMHx significant for bipolar disorder, diabetes, who presents with altered mental status. According to EMS, blood sugar reading high on their meter. Patient is somnolent but will arouse and state his name and occasionally mumble. He will stick out his tongue when asked. Otherwise not providing any meaningful history. Additional history and review of systems is limited by altered mental status. Multiple past admissions for urinary sepsis, retention as well as right hydro that resolves with hansen plaement. CT personally visualized, right hydronephrosis with markedly distended bladder despite hansen.     Hansen replaced with 20f 2 way- return of 1l clear urine      Past Medical History:   Diagnosis Date    Bipolar 1 disorder, depressed ([address]     Bipolar disorder ([address]     Depression     Diabetes ([address]     DKA, type 1 ([address] 1/27/2013    diagnosed age 23    H/O noncompliance with medical treatment, presenting hazards to health     MRSA (methicillin resistant staph aureus) culture positive     MRSA (methicillin resistant Staphylococcus aureus)     Face    Noncompliance with medication regimen     Second hand smoke exposure     Seizure ([address]     Seizures ([address] 2006 or 2007    one episode during nursing home      Past Surgical History:   Procedure Laterality Date    HX HEENT      top left wisdom tooth    HX ORTHOPAEDIC Left     wrist; MCV    UPPER GI ENDOSCOPY,BIOPSY  11/20/2018           Family History   Problem Relation Age of Onset    Diabetes Mother     Diabetes Other         neice, type 1       Social History     Tobacco Use    Smoking status: Former Smoker     Packs/day: 0.10     Years: 16.00     Pack years: 1.60     Types: Cigarettes     Start date: 10/4/1999     Quit date: 2020     Years since quittin.2    Smokeless tobacco: Never Used   Substance Use Topics    Alcohol use: No     No Known Allergies   Prior to Admission medications    Medication Sig Start Date End Date Taking? Authorizing Provider   insulin aspart U-100 (NovoLOG Flexpen U-100 Insulin) 100 unit/mL (3 mL) inpn 2 Units by SubCUTAneous route Before breakfast, lunch, and dinner. 3/12/22   Risa Feliciano MD   ondansetron (ZOFRAN ODT) 8 mg disintegrating tablet Take 1 Tablet by mouth every eight (8) hours as needed for Nausea or Vomiting. 3/3/22   Angelito Carter,    naloxone (Narcan) 4 mg/actuation nasal spray Use 1 spray intranasally, then discard. Repeat with new spray every 2 min as needed for opioid overdose symptoms, alternating nostrils. 22   Zachary Lopez MD   pen needle, diabetic 30 gauge x 3/16\" ndle 5 Units by Does Not Apply route Before breakfast, lunch, and dinner. 22   Zachary Lopez MD   amLODIPine (NORVASC) 5 mg tablet Take 1 Tablet by mouth daily. 2/15/22   Salome Shirley MD   carvediloL (COREG) 12.5 mg tablet Take 1 Tablet by mouth two (2) times daily (with meals). 22   Salome Shirley MD   finasteride (PROSCAR) 5 mg tablet Take 1 Tablet by mouth daily. 2/15/22   Salome Shirley MD   rosuvastatin (CRESTOR) 40 mg tablet Take 1 Tablet by mouth nightly. 22   Zachary Lopez MD   ondansetron (ZOFRAN ODT) 4 mg disintegrating tablet Take 1 Tablet by mouth every eight (8) hours as needed for Nausea or Vomiting. 21   Dominga Jerry MD   DULoxetine (CYMBALTA) 60 mg capsule Take 1 Capsule by mouth daily. 21   Zachary Lopez MD   pantoprazole (Protonix) 40 mg tablet Take 1 Tablet by mouth daily.  21   Delgado Lewis MD   ergocalciferol (ERGOCALCIFEROL) 1,250 mcg (50,000 unit) capsule Take 1 capsule by mouth once a week 10/30/21   Zachary Lopez MD pregabalin (Lyrica) 75 mg capsule Take 75 mg by mouth two (2) times a day. Provider, Historical         Review of Systems:  Review of systems not obtained due to patient factors. Objective:     Data Review (Labs):    Recent Labs     22  1813   WBC 16.8*   HGB 9.8*   .8*   *   *   K 7.3*   CREA 3.76*   BUN 78*   ALB 1.4*   TBILI 0.4   ALT 28   *   IPVITALS(8:)Temp (24hrs), Av.1 °F (33.4 °C), Min:87.8 °F (31 °C), Max:97.9 °F (36.6 °C)    Temp (24hrs), Av.1 °F (33.4 °C), Min:87.8 °F (31 °C), Max:97.9 °F (36.6 °C)  [id number] intake/output data recorded. Physical Exam:            General:    no distress, appears stated age, disoriented                     Skin:  Normal.   Lymph nodes:  Cervical, supraclavicular, and axillary nodes normal.             Abdomen[de-identified]  soft, non-tender. Bowel sounds normal. No masses,  no organomegaly             Genitalia:  circ male, metus normal, mild scrotal erythema left hemiscrotum.  No testicular masses          Extremities:  negative       Assessment:     Active Problems:    DKA (diabetic ketoacidosis) ([address] (2021)          Urinary retention-longstanding  Right hydronephrosis due to retention  Emphysematous cystitis    Plan:     Pan culture  HD support  Hansen  US tomorrow to monitor hydro- no stent indicated at this time asa hydro has resolved with bladder decompression in the past  Will need long term hansen or CIC, UD as outpatient    Signed By: Radha Garner MD                         May 8, 2022

## 2022-05-10 ENCOUNTER — PATIENT OUTREACH (OUTPATIENT)
Dept: CASE MANAGEMENT | Age: 42
End: 2022-05-10

## 2022-05-10 LAB
ADMINISTERED INITIALS, ADMINIT: NORMAL
ANION GAP SERPL CALC-SCNC: 10 MMOL/L (ref 5–15)
ANION GAP SERPL CALC-SCNC: 12 MMOL/L (ref 5–15)
ANION GAP SERPL CALC-SCNC: 14 MMOL/L (ref 5–15)
ANION GAP SERPL CALC-SCNC: 7 MMOL/L (ref 5–15)
BASOPHILS # BLD: 0 K/UL (ref 0–0.1)
BASOPHILS # BLD: 0 K/UL (ref 0–0.1)
BASOPHILS NFR BLD: 0 % (ref 0–1)
BASOPHILS NFR BLD: 0 % (ref 0–1)
BUN SERPL-MCNC: 48 MG/DL (ref 6–20)
BUN SERPL-MCNC: 50 MG/DL (ref 6–20)
BUN SERPL-MCNC: 53 MG/DL (ref 6–20)
BUN SERPL-MCNC: 59 MG/DL (ref 6–20)
BUN/CREAT SERPL: 16 (ref 12–20)
BUN/CREAT SERPL: 17 (ref 12–20)
BUN/CREAT SERPL: 18 (ref 12–20)
BUN/CREAT SERPL: 19 (ref 12–20)
CALCIUM SERPL-MCNC: 7.4 MG/DL (ref 8.5–10.1)
CALCIUM SERPL-MCNC: 7.5 MG/DL (ref 8.5–10.1)
CALCIUM SERPL-MCNC: 7.8 MG/DL (ref 8.5–10.1)
CALCIUM SERPL-MCNC: 8 MG/DL (ref 8.5–10.1)
CHLORIDE SERPL-SCNC: 106 MMOL/L (ref 97–108)
CHLORIDE SERPL-SCNC: 106 MMOL/L (ref 97–108)
CHLORIDE SERPL-SCNC: 108 MMOL/L (ref 97–108)
CHLORIDE SERPL-SCNC: 109 MMOL/L (ref 97–108)
CO2 SERPL-SCNC: 14 MMOL/L (ref 21–32)
CO2 SERPL-SCNC: 15 MMOL/L (ref 21–32)
CO2 SERPL-SCNC: 17 MMOL/L (ref 21–32)
CO2 SERPL-SCNC: 20 MMOL/L (ref 21–32)
CREAT SERPL-MCNC: 2.9 MG/DL (ref 0.7–1.3)
CREAT SERPL-MCNC: 2.95 MG/DL (ref 0.7–1.3)
CREAT SERPL-MCNC: 3.03 MG/DL (ref 0.7–1.3)
CREAT SERPL-MCNC: 3.06 MG/DL (ref 0.7–1.3)
D50 ADMINISTERED, D50ADM: 0 ML
D50 ORDER, D50ORD: 0 ML
DIFFERENTIAL METHOD BLD: ABNORMAL
DIFFERENTIAL METHOD BLD: ABNORMAL
EOSINOPHIL # BLD: 0 K/UL (ref 0–0.4)
EOSINOPHIL # BLD: 0.1 K/UL (ref 0–0.4)
EOSINOPHIL NFR BLD: 0 % (ref 0–7)
EOSINOPHIL NFR BLD: 0 % (ref 0–7)
ERYTHROCYTE [DISTWIDTH] IN BLOOD BY AUTOMATED COUNT: 13.6 % (ref 11.5–14.5)
ERYTHROCYTE [DISTWIDTH] IN BLOOD BY AUTOMATED COUNT: 14 % (ref 11.5–14.5)
GLSCOM COMMENTS: NORMAL
GLUCOSE BLD STRIP.AUTO-MCNC: 109 MG/DL (ref 65–117)
GLUCOSE BLD STRIP.AUTO-MCNC: 132 MG/DL (ref 65–117)
GLUCOSE BLD STRIP.AUTO-MCNC: 163 MG/DL (ref 65–117)
GLUCOSE BLD STRIP.AUTO-MCNC: 190 MG/DL (ref 65–117)
GLUCOSE BLD STRIP.AUTO-MCNC: 206 MG/DL (ref 65–117)
GLUCOSE BLD STRIP.AUTO-MCNC: 210 MG/DL (ref 65–117)
GLUCOSE BLD STRIP.AUTO-MCNC: 224 MG/DL (ref 65–117)
GLUCOSE BLD STRIP.AUTO-MCNC: 231 MG/DL (ref 65–117)
GLUCOSE BLD STRIP.AUTO-MCNC: 242 MG/DL (ref 65–117)
GLUCOSE BLD STRIP.AUTO-MCNC: 248 MG/DL (ref 65–117)
GLUCOSE BLD STRIP.AUTO-MCNC: 249 MG/DL (ref 65–117)
GLUCOSE BLD STRIP.AUTO-MCNC: 259 MG/DL (ref 65–117)
GLUCOSE BLD STRIP.AUTO-MCNC: 278 MG/DL (ref 65–117)
GLUCOSE BLD STRIP.AUTO-MCNC: 86 MG/DL (ref 65–117)
GLUCOSE BLD STRIP.AUTO-MCNC: 93 MG/DL (ref 65–117)
GLUCOSE BLD STRIP.AUTO-MCNC: 98 MG/DL (ref 65–117)
GLUCOSE SERPL-MCNC: 197 MG/DL (ref 65–100)
GLUCOSE SERPL-MCNC: 236 MG/DL (ref 65–100)
GLUCOSE SERPL-MCNC: 264 MG/DL (ref 65–100)
GLUCOSE SERPL-MCNC: 98 MG/DL (ref 65–100)
GLUCOSE, GLC: 109 MG/DL
GLUCOSE, GLC: 132 MG/DL
GLUCOSE, GLC: 190 MG/DL
GLUCOSE, GLC: 206 MG/DL
GLUCOSE, GLC: 210 MG/DL
GLUCOSE, GLC: 224 MG/DL
GLUCOSE, GLC: 231 MG/DL
GLUCOSE, GLC: 242 MG/DL
GLUCOSE, GLC: 248 MG/DL
GLUCOSE, GLC: 249 MG/DL
GLUCOSE, GLC: 259 MG/DL
GLUCOSE, GLC: 278 MG/DL
GLUCOSE, GLC: 86 MG/DL
GLUCOSE, GLC: 93 MG/DL
GLUCOSE, GLC: 98 MG/DL
HCT VFR BLD AUTO: 25.7 % (ref 36.6–50.3)
HCT VFR BLD AUTO: 27.7 % (ref 36.6–50.3)
HGB BLD-MCNC: 8.7 G/DL (ref 12.1–17)
HGB BLD-MCNC: 9.1 G/DL (ref 12.1–17)
HIGH TARGET, HITG: 250 MG/DL
IMM GRANULOCYTES # BLD AUTO: 0.1 K/UL (ref 0–0.04)
IMM GRANULOCYTES # BLD AUTO: 0.2 K/UL (ref 0–0.04)
IMM GRANULOCYTES NFR BLD AUTO: 1 % (ref 0–0.5)
IMM GRANULOCYTES NFR BLD AUTO: 1 % (ref 0–0.5)
INSULIN ADMINSTERED, INSADM: 0.9 UNITS/HOUR
INSULIN ADMINSTERED, INSADM: 0.9 UNITS/HOUR
INSULIN ADMINSTERED, INSADM: 1 UNITS/HOUR
INSULIN ADMINSTERED, INSADM: 1.1 UNITS/HOUR
INSULIN ADMINSTERED, INSADM: 1.1 UNITS/HOUR
INSULIN ADMINSTERED, INSADM: 1.2 UNITS/HOUR
INSULIN ADMINSTERED, INSADM: 1.3 UNITS/HOUR
INSULIN ADMINSTERED, INSADM: 1.6 UNITS/HOUR
INSULIN ADMINSTERED, INSADM: 13 UNITS/HOUR
INSULIN ADMINSTERED, INSADM: 17.1 UNITS/HOUR
INSULIN ADMINSTERED, INSADM: 2.1 UNITS/HOUR
INSULIN ADMINSTERED, INSADM: 2.2 UNITS/HOUR
INSULIN ADMINSTERED, INSADM: 3 UNITS/HOUR
INSULIN ADMINSTERED, INSADM: 3.5 UNITS/HOUR
INSULIN ADMINSTERED, INSADM: 3.9 UNITS/HOUR
INSULIN ORDER, INSORD: 0.9 UNITS/HOUR
INSULIN ORDER, INSORD: 0.9 UNITS/HOUR
INSULIN ORDER, INSORD: 1 UNITS/HOUR
INSULIN ORDER, INSORD: 1.1 UNITS/HOUR
INSULIN ORDER, INSORD: 1.1 UNITS/HOUR
INSULIN ORDER, INSORD: 1.2 UNITS/HOUR
INSULIN ORDER, INSORD: 1.3 UNITS/HOUR
INSULIN ORDER, INSORD: 1.6 UNITS/HOUR
INSULIN ORDER, INSORD: 13 UNITS/HOUR
INSULIN ORDER, INSORD: 17.1 UNITS/HOUR
INSULIN ORDER, INSORD: 2.1 UNITS/HOUR
INSULIN ORDER, INSORD: 2.2 UNITS/HOUR
INSULIN ORDER, INSORD: 3 UNITS/HOUR
INSULIN ORDER, INSORD: 3.5 UNITS/HOUR
INSULIN ORDER, INSORD: 3.9 UNITS/HOUR
LOW TARGET, LOT: 150 MG/DL
LYMPHOCYTES # BLD: 1.3 K/UL (ref 0.8–3.5)
LYMPHOCYTES # BLD: 1.3 K/UL (ref 0.8–3.5)
LYMPHOCYTES NFR BLD: 7 % (ref 12–49)
LYMPHOCYTES NFR BLD: 8 % (ref 12–49)
MAGNESIUM SERPL-MCNC: 2.1 MG/DL (ref 1.6–2.4)
MAGNESIUM SERPL-MCNC: 2.1 MG/DL (ref 1.6–2.4)
MAGNESIUM SERPL-MCNC: 2.2 MG/DL (ref 1.6–2.4)
MAGNESIUM SERPL-MCNC: 2.2 MG/DL (ref 1.6–2.4)
MCH RBC QN AUTO: 29 PG (ref 26–34)
MCH RBC QN AUTO: 29.2 PG (ref 26–34)
MCHC RBC AUTO-ENTMCNC: 32.9 G/DL (ref 30–36.5)
MCHC RBC AUTO-ENTMCNC: 33.9 G/DL (ref 30–36.5)
MCV RBC AUTO: 86.2 FL (ref 80–99)
MCV RBC AUTO: 88.2 FL (ref 80–99)
MINUTES UNTIL NEXT BG, NBG: 120 MIN
MINUTES UNTIL NEXT BG, NBG: 120 MIN
MINUTES UNTIL NEXT BG, NBG: 60 MIN
MONOCYTES # BLD: 1.1 K/UL (ref 0–1)
MONOCYTES # BLD: 1.5 K/UL (ref 0–1)
MONOCYTES NFR BLD: 6 % (ref 5–13)
MONOCYTES NFR BLD: 8 % (ref 5–13)
MULTIPLIER, MUL: 0.01
MULTIPLIER, MUL: 0.02
MULTIPLIER, MUL: 0.02
MULTIPLIER, MUL: 0.03
MULTIPLIER, MUL: 0.04
MULTIPLIER, MUL: 0.05
MULTIPLIER, MUL: 0.06
MULTIPLIER, MUL: 0.08
MULTIPLIER, MUL: 0.1
MULTIPLIER, MUL: 0.1
NEUTS SEG # BLD: 14.5 K/UL (ref 1.8–8)
NEUTS SEG # BLD: 15 K/UL (ref 1.8–8)
NEUTS SEG NFR BLD: 83 % (ref 32–75)
NEUTS SEG NFR BLD: 85 % (ref 32–75)
NRBC # BLD: 0 K/UL (ref 0–0.01)
NRBC # BLD: 0 K/UL (ref 0–0.01)
NRBC BLD-RTO: 0 PER 100 WBC
NRBC BLD-RTO: 0 PER 100 WBC
ORDER INITIALS, ORDINIT: NORMAL
PHOSPHATE SERPL-MCNC: 4 MG/DL (ref 2.6–4.7)
PLATELET # BLD AUTO: 458 K/UL (ref 150–400)
PLATELET # BLD AUTO: 475 K/UL (ref 150–400)
PMV BLD AUTO: 9.5 FL (ref 8.9–12.9)
PMV BLD AUTO: 9.6 FL (ref 8.9–12.9)
POTASSIUM SERPL-SCNC: 3.4 MMOL/L (ref 3.5–5.1)
POTASSIUM SERPL-SCNC: 3.6 MMOL/L (ref 3.5–5.1)
POTASSIUM SERPL-SCNC: 3.7 MMOL/L (ref 3.5–5.1)
POTASSIUM SERPL-SCNC: 3.8 MMOL/L (ref 3.5–5.1)
RBC # BLD AUTO: 2.98 M/UL (ref 4.1–5.7)
RBC # BLD AUTO: 3.14 M/UL (ref 4.1–5.7)
SERVICE CMNT-IMP: ABNORMAL
SERVICE CMNT-IMP: NORMAL
SODIUM SERPL-SCNC: 133 MMOL/L (ref 136–145)
SODIUM SERPL-SCNC: 134 MMOL/L (ref 136–145)
SODIUM SERPL-SCNC: 135 MMOL/L (ref 136–145)
SODIUM SERPL-SCNC: 136 MMOL/L (ref 136–145)
VANCOMYCIN SERPL-MCNC: 17.9 UG/ML
VANCOMYCIN SERPL-MCNC: 21.5 UG/ML
WBC # BLD AUTO: 17.4 K/UL (ref 4.1–11.1)
WBC # BLD AUTO: 17.7 K/UL (ref 4.1–11.1)

## 2022-05-10 PROCEDURE — 74011000258 HC RX REV CODE- 258: Performed by: EMERGENCY MEDICINE

## 2022-05-10 PROCEDURE — 74011636637 HC RX REV CODE- 636/637: Performed by: EMERGENCY MEDICINE

## 2022-05-10 PROCEDURE — 74011000258 HC RX REV CODE- 258: Performed by: INTERNAL MEDICINE

## 2022-05-10 PROCEDURE — 83735 ASSAY OF MAGNESIUM: CPT

## 2022-05-10 PROCEDURE — 84100 ASSAY OF PHOSPHORUS: CPT

## 2022-05-10 PROCEDURE — 74011000250 HC RX REV CODE- 250: Performed by: NURSE PRACTITIONER

## 2022-05-10 PROCEDURE — 85025 COMPLETE CBC W/AUTO DIFF WBC: CPT

## 2022-05-10 PROCEDURE — 80202 ASSAY OF VANCOMYCIN: CPT

## 2022-05-10 PROCEDURE — 80048 BASIC METABOLIC PNL TOTAL CA: CPT

## 2022-05-10 PROCEDURE — 36415 COLL VENOUS BLD VENIPUNCTURE: CPT

## 2022-05-10 PROCEDURE — 82962 GLUCOSE BLOOD TEST: CPT

## 2022-05-10 PROCEDURE — 74011250636 HC RX REV CODE- 250/636: Performed by: INTERNAL MEDICINE

## 2022-05-10 PROCEDURE — 74011250637 HC RX REV CODE- 250/637: Performed by: NURSE PRACTITIONER

## 2022-05-10 PROCEDURE — 74011636637 HC RX REV CODE- 636/637: Performed by: INTERNAL MEDICINE

## 2022-05-10 PROCEDURE — 99233 SBSQ HOSP IP/OBS HIGH 50: CPT | Performed by: CLINICAL NURSE SPECIALIST

## 2022-05-10 PROCEDURE — 74011000250 HC RX REV CODE- 250: Performed by: INTERNAL MEDICINE

## 2022-05-10 PROCEDURE — 65620000000 HC RM CCU GENERAL

## 2022-05-10 RX ORDER — POTASSIUM CHLORIDE 7.45 MG/ML
10 INJECTION INTRAVENOUS
Status: COMPLETED | OUTPATIENT
Start: 2022-05-10 | End: 2022-05-10

## 2022-05-10 RX ORDER — DEXTROSE MONOHYDRATE 100 MG/ML
50 INJECTION, SOLUTION INTRAVENOUS CONTINUOUS
Status: DISCONTINUED | OUTPATIENT
Start: 2022-05-10 | End: 2022-05-10

## 2022-05-10 RX ORDER — INSULIN LISPRO 100 [IU]/ML
4 INJECTION, SOLUTION INTRAVENOUS; SUBCUTANEOUS
Status: DISCONTINUED | OUTPATIENT
Start: 2022-05-11 | End: 2022-05-11

## 2022-05-10 RX ORDER — AMLODIPINE BESYLATE 5 MG/1
5 TABLET ORAL DAILY
Status: DISCONTINUED | OUTPATIENT
Start: 2022-05-10 | End: 2022-05-14 | Stop reason: HOSPADM

## 2022-05-10 RX ORDER — CARVEDILOL 12.5 MG/1
12.5 TABLET ORAL 2 TIMES DAILY WITH MEALS
Status: DISCONTINUED | OUTPATIENT
Start: 2022-05-10 | End: 2022-05-14 | Stop reason: HOSPADM

## 2022-05-10 RX ORDER — INSULIN LISPRO 100 [IU]/ML
INJECTION, SOLUTION INTRAVENOUS; SUBCUTANEOUS
Status: DISCONTINUED | OUTPATIENT
Start: 2022-05-10 | End: 2022-05-11

## 2022-05-10 RX ORDER — TAMSULOSIN HYDROCHLORIDE 0.4 MG/1
0.4 CAPSULE ORAL DAILY
COMMUNITY

## 2022-05-10 RX ORDER — MAGNESIUM SULFATE 100 %
4 CRYSTALS MISCELLANEOUS AS NEEDED
Status: DISCONTINUED | OUTPATIENT
Start: 2022-05-10 | End: 2022-05-14 | Stop reason: HOSPADM

## 2022-05-10 RX ORDER — DEXTROSE MONOHYDRATE 100 MG/ML
0-250 INJECTION, SOLUTION INTRAVENOUS AS NEEDED
Status: DISCONTINUED | OUTPATIENT
Start: 2022-05-10 | End: 2022-05-14 | Stop reason: HOSPADM

## 2022-05-10 RX ORDER — INSULIN GLARGINE 100 [IU]/ML
14 INJECTION, SOLUTION SUBCUTANEOUS
Status: COMPLETED | OUTPATIENT
Start: 2022-05-10 | End: 2022-05-10

## 2022-05-10 RX ORDER — TAMSULOSIN HYDROCHLORIDE 0.4 MG/1
0.4 CAPSULE ORAL DAILY
Status: DISCONTINUED | OUTPATIENT
Start: 2022-05-11 | End: 2022-05-14 | Stop reason: HOSPADM

## 2022-05-10 RX ADMIN — POTASSIUM CHLORIDE 10 MEQ: 7.46 INJECTION, SOLUTION INTRAVENOUS at 12:53

## 2022-05-10 RX ADMIN — HEPARIN SODIUM 5000 UNITS: 5000 INJECTION INTRAVENOUS; SUBCUTANEOUS at 08:58

## 2022-05-10 RX ADMIN — Medication 2.2 UNITS/HR: at 18:05

## 2022-05-10 RX ADMIN — INSULIN GLARGINE 14 UNITS: 100 INJECTION, SOLUTION SUBCUTANEOUS at 16:50

## 2022-05-10 RX ADMIN — ACETAMINOPHEN 325MG 650 MG: 325 TABLET ORAL at 20:04

## 2022-05-10 RX ADMIN — ONDANSETRON 4 MG: 4 TABLET, ORALLY DISINTEGRATING ORAL at 10:58

## 2022-05-10 RX ADMIN — PIPERACILLIN AND TAZOBACTAM 3.38 G: 3; .375 INJECTION, POWDER, LYOPHILIZED, FOR SOLUTION INTRAVENOUS at 21:56

## 2022-05-10 RX ADMIN — POTASSIUM CHLORIDE 10 MEQ: 7.46 INJECTION, SOLUTION INTRAVENOUS at 17:44

## 2022-05-10 RX ADMIN — SODIUM CHLORIDE, PRESERVATIVE FREE 10 ML: 5 INJECTION INTRAVENOUS at 13:22

## 2022-05-10 RX ADMIN — PIPERACILLIN AND TAZOBACTAM 3.38 G: 3; .375 INJECTION, POWDER, LYOPHILIZED, FOR SOLUTION INTRAVENOUS at 13:56

## 2022-05-10 RX ADMIN — DEXTROSE AND SODIUM CHLORIDE 100 ML/HR: 5; 450 INJECTION, SOLUTION INTRAVENOUS at 07:15

## 2022-05-10 RX ADMIN — PIPERACILLIN AND TAZOBACTAM 3.38 G: 3; .375 INJECTION, POWDER, LYOPHILIZED, FOR SOLUTION INTRAVENOUS at 04:59

## 2022-05-10 RX ADMIN — ACETAMINOPHEN 325MG 650 MG: 325 TABLET ORAL at 11:19

## 2022-05-10 RX ADMIN — POTASSIUM CHLORIDE 10 MEQ: 7.46 INJECTION, SOLUTION INTRAVENOUS at 13:55

## 2022-05-10 RX ADMIN — Medication 3 UNITS: at 17:47

## 2022-05-10 RX ADMIN — HEPARIN SODIUM 5000 UNITS: 5000 INJECTION INTRAVENOUS; SUBCUTANEOUS at 16:51

## 2022-05-10 RX ADMIN — AMLODIPINE BESYLATE 5 MG: 5 TABLET ORAL at 21:59

## 2022-05-10 RX ADMIN — VANCOMYCIN HYDROCHLORIDE 500 MG: 500 INJECTION, POWDER, LYOPHILIZED, FOR SOLUTION INTRAVENOUS at 17:42

## 2022-05-10 RX ADMIN — Medication 13 UNITS/HR: at 11:17

## 2022-05-10 RX ADMIN — DEXTROSE MONOHYDRATE 50 ML/HR: 100 INJECTION, SOLUTION INTRAVENOUS at 08:08

## 2022-05-10 RX ADMIN — SODIUM CHLORIDE, PRESERVATIVE FREE 10 ML: 5 INJECTION INTRAVENOUS at 21:56

## 2022-05-10 RX ADMIN — ONDANSETRON 4 MG: 4 TABLET, ORALLY DISINTEGRATING ORAL at 01:21

## 2022-05-10 RX ADMIN — HEPARIN SODIUM 5000 UNITS: 5000 INJECTION INTRAVENOUS; SUBCUTANEOUS at 01:21

## 2022-05-10 RX ADMIN — POTASSIUM CHLORIDE 10 MEQ: 7.46 INJECTION, SOLUTION INTRAVENOUS at 16:13

## 2022-05-10 RX ADMIN — CARVEDILOL 12.5 MG: 12.5 TABLET, FILM COATED ORAL at 21:59

## 2022-05-10 RX ADMIN — SODIUM CHLORIDE, PRESERVATIVE FREE 10 ML: 5 INJECTION INTRAVENOUS at 05:41

## 2022-05-10 NOTE — DIABETES MGMT
[address] [facility]    CLINICAL NURSE SPECIALIST CONSULT     Initial Presentation   Janice Vazquez is a 46 y.o. male admitted from the ER with altered mental status. Found to have >999 cc in bladder => Beard placed. LAB: BG 1335 with incalculable AG. Urinary glucose & ketone +. WBC 16.8. Hyponatremic. K 7.3. FELECIA. CXR: No acute process noted  CT ABd: Markedly distended urinary bladder and moderately severe right-sided hydronephrosis despite the presence of a Beard catheter; correlate with Beard catheter function. Air throughout the urinary bladder wall is suspicious for emphysematous cystitis given the clinical finding of sepsis. HX:   Past Medical History:   Diagnosis Date    Bipolar 1 disorder, depressed ([address]     Bipolar disorder ([address]     Depression     Diabetes ([address]     DKA, type 1 ([address] 1/27/2013    diagnosed age 23    H/O noncompliance with medical treatment, presenting hazards to health     MRSA (methicillin resistant staph aureus) culture positive     MRSA (methicillin resistant Staphylococcus aureus)     Face    Noncompliance with medication regimen     Second hand smoke exposure     Seizure ([address]     Seizures ([address] 2006 or 2007    one episode during snf      INITIAL DX:   DKA (diabetic ketoacidosis) ([address] [E11.10]     Current Treatment     TX: Insulin. IVF. Heparin. ABx. Consulted by Provider for advanced diabetes nursing assessment and care for:   [x] Transitioning off [facility]    [x] Inpatient management strategy  [x] Home management assessment  [] Survival skill education    Hospital Course   Clinical progress has been complicated by need for ICU level of care. 5/9/22  retroperitoneum: The right kidney measures 13.6 cm in sagittal length. The left kidney measures 13.8 cm in sagittal length. There is no hydronephrosis. No renal cyst, calculus or mass is visualized.  The abdominal aorta and aortic bifurcation are not well visualized due to overlying bowel gas. The visualized IVC is normal. The urinary bladder is incompletely distended and a Beard catheter is present. 5/10/22 Alert & oriented today. NPO. Beard+. Frequent stools. Diabetes History   Per EMR, patient has Type 1 diabetes since age 23, and positive family history in mother and niece. Admission BG 1335. A1c 11.4% (3/8/22). Diabetes care per PCP, but has appointment to see endocrinologist in June. Diabetes-related Medical History  Acute complications  DKA  Other associated conditions     Depression    Diabetes Medication History  Key Antihyperglycemic Medications             insulin aspart U-100 (NovoLOG Flexpen U-100 Insulin) 100 unit/mL (3 mL) inpn 2 Units by SubCUTAneous route Before breakfast, lunch, and dinner. Lantus insulin 16 units D     Diabetes self-management practices: Patient reports taking standard doses of basal and mealtime insulin. He is using a Connor@ CGM system and corrects when he sees high blood glucoses; he gives as much as 20 units to correct. He is injecting into his belly and has evidence of overuse in the areas adjacent to his umbilicus. Patient eats a lunch & evening meal. Drinks coffee and unsweetened tea. Subjective   \"Everything was going well. My daughter got sick and then this happened. \"     Objective   Physical exam  General Normal weight male who is in no acute distress  Neuro  Alert & oriented  Vital Signs   Visit Vitals  BP (!) 199/87   Pulse 85   Temp 98.3 °F (36.8 °C)   Resp 13   Ht 5' 9.02\" (1.753 m)   Wt 70.8 kg (156 lb 1.4 oz)   SpO2 98%   BMI 23.04 kg/m²     Laboratory  Recent Labs     05/10/22  1438 05/10/22  1032 05/10/22  0627 05/10/22  0010 05/10/22  0010 05/09/22  0426 05/09/22  0031 05/08/22  1813 05/08/22  1813   GLU 98 236* 264*   < > 197*   < > 979*   < > 1,335*   AGAP 7 10 14   < > 12   < > 24*   < > Cannot be calculated   WBC  --   --  17.7*  --  17.4*  --  14.9*   < > 16.8*   CREA 2.90* 3.06* 2.95*   < > 3.03*   < > 3.60* < > 3.76*   GFRNA 24* 23* 24*   < > 23*   < > 19*   < > 18*   AST  --   --   --   --   --   --   --   --  17   ALT  --   --   --   --   --   --   --   --  28    < > = values in this interval not displayed. Factors impacting BG management  Factor Dose Comments   Nutrition:  NPO       Infection Zosyn Q8 hrs Afebrile. WBC elevated  Blood cultures negative. Urine with gram negative rods   Other:   Kidney function  Liver function   FELECIA  Liver enzymes normal      Blood glucose pattern      Significant diabetes-related events over the past 24-72 hours  Admitted in DKA with BG 1335 and incalculable AG. Received fluid resuscitation & insulin  BG <250mg/dl @10am 5/9/22 => D5/.45NaCl started @100cc/hr  AG closed 5/9/22 X2 @530pm 5/9/22 => not transitioned off GS  AG has reopened 630am 5/10/22 => switched to D10 @ 50cc/hr @8am    Assessment and Plan   Nursing Diagnosis Risk for unstable blood glucose pattern   Nursing Intervention Domain 5256 Decision-making Support   Nursing Interventions Examined current inpatient diabetes/blood glucose control   Explored factors facilitating and impeding inpatient management  Explored corrective strategies with patient and responsible inpatient provider   Informed patient of rational for insulin strategy while hospitalized     Evaluation   This normal weight  male with known Type 1 diabetes was admitted in DKA, as evidenced by BG 1335 & incalculable AG. In our conversation today, he reports taking his insulins regularly. He has been using a Libre2 CGM system to detect BG patterns and address accordingly. He was surprised by this event and the need for hospitalization. Blood glucoses came down yesterday with a significant amount of insulin (578.9 ml). Patient's AG reopened and IVFs were switched to D10 this morning and the multiplier changed. Insulin running at about 13 units/hr presently.       Once the AG closes X2, as patient has Type 1 diabetes, transition off GS with home basal insulin dose and give mealtime insulin for consumed meals. The patient's gap is now closed x 2. The patient insulin drip usage has decreased significantly over that past 3 hours. This patient is well know to diabetes management and in considering current and past BG trends, I think starting at (0.2x kg) is a good strategy for this patient. I recommend transitioning off glucostabilizer with 14 units Lantus. Give 4 units Humalog with each consumed meal greater than 51%. Recommendations       Transition insulin dosing   Use of Subcutaneous Insulin Order set (0174)  Insulin Dosing Specific recommendation   Basal                                      (Based on weight, BMI & GFR) [x]        0.2 units/kg/D  [] 0.3 units/kg/D  [] 0.4 units/kg/D Transition off glucostabilizer with 14 units Lantus. Nutritional                                      (Based on CHO/dextrose load) [x] Normal sensitivity  [] Insulin-resistant sensitivity Humalog insulin 4 units with consumed meals   Corrective                                       (Useful in adjusting insulin dosing) [] Normal sensitivity  [x] HIGH sensitivity  [] Insulin-resistant sensitivity      Billing Code(s)   [x]  IP subsequent hospital care     Before making these care recommendations, I personally reviewed the hospitalization record, including notes, laboratory & diagnostic data and current medications, and examined the patient at the bedside (circumstances permitting) before making care recommendations. More than fifty (50) percent of the time was spent in patient counseling and/or care coordination.   Total minutes:     ANEESH Vargas  Diabetes Clinical Nurse Specialist  [facility]  Access via [employer]

## 2022-05-10 NOTE — PROGRESS NOTES
1900: Report received from [address]: Pt assessed, still having pain from hasnen site. Site inspected, appears visibly more red and irritated than this morning, causing distress to patient. Urology had mentioned going down a size if it caused problems on 5/8 when they came to assess, will bring this up to NP. Completed PTA medlist with help from pt, updated list is accurate. NP aware of elevated BP.    2020: Order to place smaller size hansen received. 0000: Pt reassessed, having frequent loose stools. 0400: Pt reassessed, having some nausea and coughing up stomach acid. Zofran provided. 1485: pt still having severe nausea, NP notified, orders for compazine received. Alerted that Pt is back in DKA. 0700: Report given to Zach vogt RN. Problem: Body Temperature -  Risk of, Imbalanced  Goal: *Absence of heat stress or hyperthermia signs and symptoms  Outcome: Progressing Towards Goal  Goal: *Absence of cold stress or hypothermia signs and symptoms  Outcome: Progressing Towards Goal     Problem: Patient Education: Go to Patient Education Activity  Goal: Patient/Family Education  Outcome: Progressing Towards Goal     Problem: Falls - Risk of  Goal: *Absence of Falls  Description: Document Shannon Fall Risk and appropriate interventions in the flowsheet.   Outcome: Progressing Towards Goal  Note: Fall Risk Interventions:  Mobility Interventions: Bed/chair exit alarm,Communicate number of staff needed for ambulation/transfer    Mentation Interventions: Adequate sleep, hydration, pain control,Bed/chair exit alarm,Door open when patient unattended,Evaluate medications/consider consulting pharmacy    Medication Interventions: Assess postural VS orthostatic hypotension,Bed/chair exit alarm,Evaluate medications/consider consulting pharmacy,Patient to call before getting OOB,Teach patient to arise slowly    Elimination Interventions: Bed/chair exit alarm,Call light in reach,Patient to call for help with toileting needs,Toileting schedule/hourly rounds              Problem: Patient Education: Go to Patient Education Activity  Goal: Patient/Family Education  Outcome: Progressing Towards Goal     Problem: Urinary Tract Infection - Adult  Goal: *Absence of infection signs and symptoms  Outcome: Progressing Towards Goal     Problem: Patient Education: Go to Patient Education Activity  Goal: Patient/Family Education  Outcome: Progressing Towards Goal     Problem: Pressure Injury - Risk of  Goal: *Prevention of pressure injury  Description: Document Corey Scale and appropriate interventions in the flowsheet.   Outcome: Progressing Towards Goal  Note: Pressure Injury Interventions:  Sensory Interventions: Assess changes in LOC,Assess need for specialty bed,Avoid rigorous massage over bony prominences,Float heels,Keep linens dry and wrinkle-free    Moisture Interventions: Absorbent underpads,Internal/External urinary devices    Activity Interventions: Assess need for specialty bed,Increase time out of bed,Pressure redistribution bed/mattress(bed type)    Mobility Interventions: Assess need for specialty bed,Float heels,Pressure redistribution bed/mattress (bed type)    Nutrition Interventions: Discuss nutritional consult with provider    Friction and Shear Interventions: Apply protective barrier, creams and emollients,Lift sheet,Lift team/patient mobility team,Minimize layers                Problem: Patient Education: Go to Patient Education Activity  Goal: Patient/Family Education  Outcome: Progressing Towards Goal

## 2022-05-10 NOTE — PROGRESS NOTES
Pharmacy Antimicrobial Kinetic Dosing    Indication for Antimicrobials: UTI, emphysematous cystitis    Current Regimen of Each Antimicrobial:  Zosyn 3.375 gm IV q8h - started  day 3  Vancomycin dose by level - started  day 2 of 7    Previous Antimicrobial Therapy:    Vancomycin Goal Level: AUC: 400-600 mg/hr/Liter/day    Date Dose & Interval Measured (mcg/mL) Predicted AUC/DIPTI    23:30 Per levels                   Dosing calculator used: [place] calculator     Significant Positive Cultures:    Urine: GNR>100k. Pending   blood: NG. Pending    Conditions for Dosing Consideration:  FELECIA    Labs:  Recent Labs     05/10/22  1032 05/10/22  0627 05/10/22  0010 22  0426 22  0031 22  2106 22  1813   CREA 3.06* 2.95* 3.03*   < > 3.60*  --    < >   BUN 50* 53* 59*   < > 79*  --    < >   PCT  --   --   --   --  3.40 2.27  --     < > = values in this interval not displayed. Recent Labs     05/10/22  0627 05/10/22  0010 22  0031 22  1813   WBC 17.7* 17.4* 14.9* 16.8*   BANDS  --   --   --  1     Temp (24hrs), Av.4 °F (36.9 °C), Min:98.1 °F (36.7 °C), Max:98.6 °F (37 °C)    Creatinine Clearance (mL/min):   CrCl (Ideal Body Weight): 32.4   If actual weight < IBW: CrCl (Actual Body Weight) 32.5    Impression/Plan:   Resume Vancomycin at 500 mg IV q24h for an expected AUC ~430  Continue Zosyn  BMP, check Vanc level in AM  Antimicrobial stop date:  Vanc 7 days, Zosyn pending     Pharmacy will follow daily and adjust medications as appropriate for renal function and/or serum levels.     Thank you,  Jojo Kent, PHARMD

## 2022-05-10 NOTE — DIABETES MGMT
[address] [facility]    CLINICAL NURSE SPECIALIST CONSULT     Initial Presentation   Gustavo Feliciano is a 46 y.o. male admitted from the ER with altered mental status. Found to have >999 cc in bladder => Beard placed. LAB: BG 1335 with incalculable AG. Urinary glucose & ketone +. WBC 16.8. Hyponatremic. K 7.3. FELECIA. CXR: No acute process noted  CT ABd: Markedly distended urinary bladder and moderately severe right-sided hydronephrosis despite the presence of a Beard catheter; correlate with Beard catheter function. Air throughout the urinary bladder wall is suspicious for emphysematous cystitis given the clinical finding of sepsis. HX:   Past Medical History:   Diagnosis Date    Bipolar 1 disorder, depressed ([address]     Bipolar disorder ([address]     Depression     Diabetes ([address]     DKA, type 1 ([address] 1/27/2013    diagnosed age 23    H/O noncompliance with medical treatment, presenting hazards to health     MRSA (methicillin resistant staph aureus) culture positive     MRSA (methicillin resistant Staphylococcus aureus)     Face    Noncompliance with medication regimen     Second hand smoke exposure     Seizure ([address]     Seizures ([address] 2006 or 2007    one episode during [facility]      INITIAL DX:   DKA (diabetic ketoacidosis) ([address] [E11.10]     Current Treatment     TX: Insulin. IVF. Heparin. ABx. Consulted by Provider for advanced diabetes nursing assessment and care for:   [x] Transitioning off [facility]   [x] Inpatient management strategy  [x] Home management assessment  [] Survival skill education    Hospital Course   Clinical progress has been complicated by need for ICU level of care. 5/9/22  retroperitoneum: The right kidney measures 13.6 cm in sagittal length. The left kidney measures 13.8 cm in sagittal length. There is no hydronephrosis. No renal cyst, calculus or mass is visualized.  The abdominal aorta and aortic bifurcation are not well visualized due to overlying bowel gas. The visualized IVC is normal. The urinary bladder is incompletely distended and a Beard catheter is present. 5/10/22 Alert & oriented today. NPO. Beard+. Frequent stools. Diabetes History   Per EMR, patient has Type 1 diabetes since age 23, and positive family history in mother and niece. Admission BG 1335. A1c 11.4% (3/8/22). Diabetes care per PCP, but has appointment to see endocrinologist in June. Diabetes-related Medical History  Acute complications  DKA  Other associated conditions     Depression    Diabetes Medication History  Key Antihyperglycemic Medications             insulin aspart U-100 (NovoLOG Flexpen U-100 Insulin) 100 unit/mL (3 mL) inpn 2 Units by SubCUTAneous route Before breakfast, lunch, and dinner. Lantus insulin 16 units D     Diabetes self-management practices: Patient reports taking standard doses of basal and mealtime insulin. He is using a Connor@ CGM system and corrects when he sees high blood glucoses; he gives as much as 20 units to correct. He is injecting into his belly and has evidence of overuse in the areas adjacent to his umbilicus. Patient eats a lunch & evening meal. Drinks coffee and unsweetened tea. Subjective   \"Everything was going well. My daughter got sick and then this happened. \"     Objective   Physical exam  General Normal weight male who is in no acute distress  Neuro  Alert & oriented  Vital Signs   Visit Vitals  BP (!) 173/96   Pulse 92   Temp 98.4 °F (36.9 °C)   Resp 12   Ht 5' 9.02\" (1.753 m)   Wt 70.8 kg (156 lb 1.4 oz)   SpO2 96%   BMI 23.04 kg/m²     Laboratory  Recent Labs     05/10/22  0627 05/10/22  0010 05/09/22  1728 05/09/22  0426 05/09/22  0031 05/08/22  1813 05/08/22  1813   * 197* 110*   < > 979*   < > 1,335*   AGAP 14 12 9   < > 24*   < > Cannot be calculated   WBC 17.7* 17.4*  --   --  14.9*   < > 16.8*   CREA 2.95* 3.03* 3.23*   < > 3.60*   < > 3.76*   GFRNA 24* 23* 22*   < > 19*   < > 18*   AST  --   --   -- --   --   --  17   ALT  --   --   --   --   --   --  28    < > = values in this interval not displayed. Factors impacting BG management  Factor Dose Comments   Nutrition:  NPO       Infection Zosyn Q8 hrs Afebrile. WBC elevated  Blood cultures negative. Urine with gram negative rods   Other:   Kidney function  Liver function   FELECIA  Liver enzymes normal      Blood glucose pattern      Significant diabetes-related events over the past 24-72 hours  Admitted in DKA with BG 1335 and incalculable AG. Received fluid resuscitation & insulin  BG <250mg/dl @10am 5/9/22 => D5/.45NaCl started @100cc/hr  AG closed 5/9/22 X2 @530pm 5/9/22 => not transitioned off GS  AG has reopened 630am 5/10/22 => switched to D10 @ 50cc/hr @8am    Assessment and Plan   Nursing Diagnosis Risk for unstable blood glucose pattern   Nursing Intervention Domain 5256 Decision-making Support   Nursing Interventions Examined current inpatient diabetes/blood glucose control   Explored factors facilitating and impeding inpatient management  Explored corrective strategies with patient and responsible inpatient provider   Informed patient of rational for insulin strategy while hospitalized     Evaluation   This normal weight  male with known Type 1 diabetes was admitted in DKA, as evidenced by BG 1335 & incalculable AG. In our conversation today, he reports taking his insulins regularly. He has been using a Libre2 CGM system to detect BG patterns and address accordingly. He was surprised by this event and the need for hospitalization. Blood glucoses came down yesterday with a significant amount of insulin (578.9 ml). Patient's AG reopened and IVFs were switched to D10 this morning and the multiplier changed. Insulin running at about 13 units/hr presently. Once the AG closes X2, as patient has Type 1 diabetes, transition off GS with home basal insulin dose and give mealtime insulin for consumed meals.     Recommendations [x] Glucostabilizer until [employer] closes X2 (less than 12). Transition insulin dosing   Use of Subcutaneous Insulin Order set (9158)  Insulin Dosing Specific recommendation   Basal                                      (Based on weight, BMI & GFR) [x]        0.2 units/kg/D  [] 0.3 units/kg/D  [] 0.4 units/kg/D Lantus insulin 16 D   Nutritional                                      (Based on CHO/dextrose load) [x] Normal sensitivity  [] Insulin-resistant sensitivity Humalog insulin 4 units with consumed meals   Corrective                                       (Useful in adjusting insulin dosing) [] Normal sensitivity  [x] HIGH sensitivity  [] Insulin-resistant sensitivity      Billing Code(s)   [x] 94433 IP subsequent hospital care - 35 minutes    Before making these care recommendations, I personally reviewed the hospitalization record, including notes, laboratory & diagnostic data and current medications, and examined the patient at the bedside (circumstances permitting) before making care recommendations. More than fifty (50) percent of the time was spent in patient counseling and/or care coordination.   Total minutes: [address], CNS  Diabetes Clinical Nurse Specialist  [facility]  Access via [address] [place] [address]

## 2022-05-10 NOTE — PROGRESS NOTES
Transition of Care Plan:    RUR:  25%  Disposition:  Home w/ OP f/u  Follow up appointments:  PCP; Endocr; Diabetes Educ  DME needed:  Likely none  Transportation at Discharge: Mother or Medicaid car  [address] or means to access home:   mother     IM Medicare Letter:  n/a  Is patient a BCPI-A Bundle: If yes, was Bundle Letter given?:    Is patient a  and connected with the [place]?    n/a            If yes, was [employer] transfer form completed and [facility] notified? Caregiver Contact:  Khadijah Gutierrez-[phone]  Discharge Caregiver contacted prior to discharge? Care Conference needed?:       Reason for Admission:   Pt is a 47 yo male admitted w/ AMS- BS was 1335 - in DKA. Type 1 DM since his 19's. Pt has had multiple admissions at [address] and [facility] for DKA in past months. 2/8 - 2/14/22  [address]  3/8/ - 3/12/22  [address]  3/31 - 4/2/22  [facility]                 RUR Score:     25%      PCP: First and Last name:  Anthony Solis MD     Name of Practice:  [facility]    Are you a current patient: Yes/No:  Yes   Approximate date of last visit:  2/23/22   Can you do a virtual visit with your PCP:              Resources/supports as identified by patient/family:   Lives w/ mom who drives him to appointments                Top Challenges facing patient (as identified by patient/family and CM): Finances/Medication cost?    Medicaid should cover all prescribed meds              Transportation? Mother drives or can use Medicaid transport              Support system or lack thereof? Living arrangements? 1 story home w/ mom             Self-care/ADLs/Cognition?   Indep for ADLs and mobility          Current Advanced Directive/Advance Care Plan:  Full Code      Healthcare Decision Maker:   Click here to complete HealthCare Decision Makers including selection of the Healthcare Decision Maker Relationship (ie \"Primary\")      Primary Decision MakerKeljorge Beth - [phone]    Payor Source Payor: [employer] and [employer] HE[employer] / Plan: [facility] [address] / Product Type: Managed Care Medicaid /                             Plan for utilizing home health:   Pt is not homebound so will not qualify                  Transition of Care Plan:      Pt remains in CCU - insulin drip, working on gap which reopened this am; hansen for R hydronephrosis to be dc'd. Pt has had so many admissions - has not been able to see PCP. Was scheduled to attend Diabetic Education class on 4/27 - unable to attend due to fall and subsequent back pain.              -Will likely dc home w/ family. Needs PCP appmt arranged prior to dc.  -Has BGM and access to insulin w/ his Medicaid. Mother drives to appmts or he can use Medicaid cab/[employer].  --as Endocr. appmt on 6/21/22 w/ Dr. Sammy Koehler ([address]). -Will need [facility] f/u appmt for retention/R hydronephrosis. Care Management Interventions  PCP Verified by CM: Yes  Last Visit to PCP: 02/23/22  Palliative Care Criteria Met (RRAT>21 & CHF Dx)?: No  Mode of Transport at Discharge:  Other (see comment)  Transition of Care Consult (CM Consult): Discharge Planning  Discharge Durable Medical Equipment: No  Physical Therapy Consult: No  Occupational Therapy Consult: No  Speech Therapy Consult: No  Support Systems: Parent(s)  Confirm Follow Up Transport: Family  Discharge Location  Patient Expects to be Discharged to[de-identified] Home with outpatient services     KAVEH Fajardo

## 2022-05-10 NOTE — PROGRESS NOTES
Patient has graduated from the Transitions of Care Coordination  program on 5/10/22. Patient/family has the ability to self-manage at this time Care management goals have been completed. Patient was not referred to the [facility] team for further management. Patient has had multiple readmits and missed calls from CTN. From last admission of 3/31 it had been 30 days since most recent admission of 5/8/22. Yasmin Mckeon RN, CPN - Care Transition Nurse- [phone]    Goals Addressed                 This Visit's Progress     COMPLETED: Prevent complications post hospitalization. Not on track     2/15/22     Patient to check blood sugars 3 x a day   Patient to check bp daily   Patient to wash hansen with soap and water daily   Patient to attend pcp appt on 2/16 virtual and make appt with urology at u in one week.  Patient to complete antibiotic  and take all other meds as prescribed and listed in dc paperwork.  Ctn to follow up in one week. Lacie Brandon RN      2/22/22   Patient to check blood sugars 3 x a day   Patient to check bp daily   Patient to wash hansen with soap and water daily   Patient to attend pcp appt on 2/23 virtual and ctn called to make appt with urology at u this week.  Patient to complete antibiotic  and take all other meds as prescribed and listed in dc paperwork.  Ctn to follow up in  one week.  Yasmin Mckeon RN      3/21/22   Patient to go to urgent care due to open wound on leg and swelling.  Patient to check blood sugars 3 x a day   Patient to check bp daily   Ctn to follow up in 3 to 5 days. Lacie Brandon RN    4/14/22     Patient given number to [address] to get eye injections   Patient to check bs 3 to 4 times a day.  Ctn to follow up in one week. Lacie Brandon RN              Patient has Care Transition Nurse's contact information for any further questions, concerns, or needs.   Patients upcoming visits:    Future Appointments   Date Time Provider Caryl Foley   6/21/2022 11:30 AM Orestes Atkinson MD RDE [facility] [place] [facility] AMB

## 2022-05-10 NOTE — PROGRESS NOTES
Progress Note    Patient: Kailey Wilhelm MRN: [id number]  SSN: [id number]    YOB: 1980  Age: 46 y.o. Sex: male        ADMITTED:  2022 to Deysi Wallace MD  for DKA (diabetic ketoacidosis) [facility]) [E11.10]         Kailey Wilhelm was admitted for DKA (diabetic ketoacidosis) ([facility] [E11.10]     Alerted and oriented today. Blood sugars now controlled post insulin gtt. 2700 UOP from hansen. Leukocytosis persistent. Creat improved to 2.9 with baseline around 2. Urine cx GNR. Blood cx NG so far, still preliminary. On zosyn and vanc    Hansen placed two days ago for >1Liter retention with CTpersonally visualized, right hydronephrosis with markedly distended bladder, concern for emphysematous cystitis. Treated with hansen and empiric iv abx. Vitals:  Temp (24hrs), Av.3 °F (36.8 °C), Min:97.7 °F (36.5 °C), Max:98.6 °F (37 °C)     Blood pressure (!) 176/93, pulse 87, temperature 98.4 °F (36.9 °C), resp. rate 19, height 5' 9.02\" (1.753 m), weight 70.8 kg (156 lb 1.4 oz), SpO2 97 %. I&O's:  1901 - 05/10 07  In: 8390.1 [P.O.:1250; I.V.:7090.1]  Out: [place] [EFMUV:5117]   05/10 07 - 05/10 1900  In: -   Out: 400 [Urine:400]     Exam:   NAD. abdomen soft, NT  Oriented today   moves all extremities   Denies flank pain   Hansen draining clear yellow UA  On Room air. Aerating well      Labs:   Recent Labs     05/10/22  0627 05/10/22  0010 22  0031   WBC 17.7* 17.4* 14.9*   HGB 9.1* 8.7* 8.6*   HCT 27.7* 25.7* 27.2*   * 458* 460*     Recent Labs     05/10/22  0627 05/10/22  0010 22  1728   * 135* 137   K 3.6 3.8 3.9    108 109*   CO2 14* 15* 19*   * 197* 110*   BUN 53* 59* 64*   CREA 2.95* 3.03* 3.23*   CA 7.4* 7.5* 7.7*        Cultures:      Imaging:       Assessment:     - Active Problems:    [facility]    right hydronephrosis - often resolves with hansen.  ELICEO yesterday showed resolved hydro    Urinary retention - now with hansen     Emphysematous cystitis - treated with hansen and cx directed abx. On zosyn and vanc    AMS- Type 1 DM with DKA on insulin gtt    Plan:     - empiric abx until able to target to culture directed  - continue hasnen long term through discharge. UD as outpatient, will arrange  -US- resolved hydronephrosis, no stent intervention needed at this time  -monitor renal function, tight glycemic control.  HD support  -will sign off, please call if needed, discharge will hansen and will arrange OP follow up     Supervising MD, Dr. Tc Head By: Devon Del Cid NP - May 10, 2022

## 2022-05-10 NOTE — PROGRESS NOTES
[facility]    ICU TEAM Progress Note    Name: Kye Charles   : 1980   MRN: [id number]   Date: 5/10/2022           ICU Assessment & Plan of Emely Ray Is a 46 y.o. male with bipolar disorder, HTN, DM1 admitted  with DKA. NEURO  #. Pain, agitation, delirium  - tylenol prn      CARDIAC  No acute      RESPIRATORY  No acute      RENAL  #. FELECIA  #. Emphysematous cystitis, R hydro  - Urology consulted; greatly appreciate assistance  - Beard to stay until outpatient appt  - e- repletion prn      GASTROINTESTINAL  #. At risk for malnutrition  - NPO until gap closed/transitioned  - Nutrition following      HEMATOLOGIC  #. Anemia, likely AoCD  - no indication for transfusion at this time; monitor      ID  #. Leukocytosis, elevated procal  - empiric vanc, zosyn pending cx data  - GNRs in UCx -- likely dc vanc today      ENDOCRINE  #. T1DM with DKA  - DKA protocol, currently on insulin gtt + D10 (has been volume resus'd)  - gap reopened this AM despite being on gtt overnight -- may need to increase multiplier on glucommander  - cont insulin for another two BMPs with gap closed      DVT Prophylaxis: SCD's, heparin  U - Ulcer Prophylaxis: not indicated  G - Glycemic Control: Insulin  B - Bowel Regimen: miralax prn  Tubes: None  Lines: Peripheral IV  Drains: None    Subjective:   Progress Note: 5/10/2022      Reason for ICU Admission: DKA     HPI: (per prior clinicians) 47 y/o with pmh of bipolar disorder, HTN, DM1 presents to [address] [facility] ICU for AMS. Upon arrival HDS, lethargic and disoriented, opens eyes to voice and follows commands. Lab work done and significant for pH 6.95 with a bicarb of 3. Glucose 1335,  (corrected 136), K 7.3, Cr 3.76, WBC 16.8. Initially temp unable to be obtained, core temp taken and showed a temp of 88.9 deg F.  Placed on warming blanket. Given 2 L IVF, calcium, and started on an insulin gtt.   ICU consulted for admission.     Upon my arrival pt still HDS and lethargic and disoriented. Physical exam significant for lower abdomen distention, rigidity and tenderness. Bladder scan done and showed >999. Beard placed. Will get CT abd to r/o intra abdominal source of infection. Pt will be admitted to the ICU for further management.     Called back to bedside bc pt agitated and pulled out one of his IVs,unable to to get IV access. Also Map 62. Upon my assessment pt agitated, somewhat redirectable. I placed a ultrasound guided 20 karissa IV in the rt basilic vein and severo all labs. I accompanied pt and nurse to CT (to r/o intra abdominal source of infection) since BP borderline. Pt then transferred to ICU without complication. Overnight Events:   5/9: Gap decreasing. 5/10: gap closed yesterday evening but left on gtt to transition in AM. Gap reopened this AM.        Home Medications:     Prior to Admission medications    Medication Sig Start Date End Date Taking? Authorizing Provider   insulin aspart U-100 (NovoLOG Flexpen U-100 Insulin) 100 unit/mL (3 mL) inpn 2 Units by SubCUTAneous route Before breakfast, lunch, and dinner. 3/12/22   Nasra Nix MD   ondansetron (ZOFRAN ODT) 8 mg disintegrating tablet Take 1 Tablet by mouth every eight (8) hours as needed for Nausea or Vomiting. 3/3/22   Brielle Dougherty DO   naloxone (Narcan) 4 mg/actuation nasal spray Use 1 spray intranasally, then discard. Repeat with new spray every 2 min as needed for opioid overdose symptoms, alternating nostrils. 2/23/22   Wicho Awad MD   pen needle, diabetic 30 gauge x 3/16\" ndle 5 Units by Does Not Apply route Before breakfast, lunch, and dinner. 2/23/22   Wicho Awad MD   amLODIPine (NORVASC) 5 mg tablet Take 1 Tablet by mouth daily. 2/15/22   Nic Hill MD   carvediloL (COREG) 12.5 mg tablet Take 1 Tablet by mouth two (2) times daily (with meals). 2/14/22   Nic Hill MD   finasteride (PROSCAR) 5 mg tablet Take 1 Tablet by mouth daily. 2/15/22   Fanta Velazquez MD   rosuvastatin (CRESTOR) 40 mg tablet Take 1 Tablet by mouth nightly. 1/27/22   Terrea Pallas, MD   ondansetron (ZOFRAN ODT) 4 mg disintegrating tablet Take 1 Tablet by mouth every eight (8) hours as needed for Nausea or Vomiting. 12/28/21   Linda Gómez MD   DULoxetine (CYMBALTA) 60 mg capsule Take 1 Capsule by mouth daily. 12/13/21   Terrea Pallas, MD   pantoprazole (Protonix) 40 mg tablet Take 1 Tablet by mouth daily. 12/6/21   Indu New MD   ergocalciferol (ERGOCALCIFEROL) 1,250 mcg (50,000 unit) capsule Take 1 capsule by mouth once a week 10/30/21   Terrea Pallas, MD   pregabalin (Lyrica) 75 mg capsule Take 75 mg by mouth two (2) times a day. Provider, Historical       Current Meds:     Current Facility-Administered Medications   Medication Dose Route Frequency    Vancomycin random level: 5/10, 1200.  thanks   Other ONCE    heparin (porcine) injection 5,000 Units  5,000 Units SubCUTAneous Q8H    dextrose 5 % - 0.45% NaCl infusion  100 mL/hr IntraVENous CONTINUOUS    insulin regular (NOVOLIN R, HUMULIN R) 100 Units in 0.9% sodium chloride 100 mL infusion  0-50 Units/hr IntraVENous TITRATE    insulin lispro (HUMALOG) injection   SubCUTAneous TIDAC    glucose chewable tablet 16 g  4 Tablet Oral PRN    dextrose 10% infusion 0-250 mL  0-250 mL IntraVENous PRN    glucagon (GLUCAGEN) injection 1 mg  1 mg IntraMUSCular PRN    sodium chloride (NS) flush 5-40 mL  5-40 mL IntraVENous Q8H    sodium chloride (NS) flush 5-40 mL  5-40 mL IntraVENous PRN    acetaminophen (TYLENOL) tablet 650 mg  650 mg Oral Q6H PRN    Or    acetaminophen (TYLENOL) suppository 650 mg  650 mg Rectal Q6H PRN    polyethylene glycol (MIRALAX) packet 17 g  17 g Oral DAILY PRN    ondansetron (ZOFRAN ODT) tablet 4 mg  4 mg Oral Q8H PRN    Or    ondansetron (ZOFRAN) injection 4 mg  4 mg IntraVENous Q6H PRN    piperacillin-tazobactam (ZOSYN) 3.375 g in 0.9% sodium chloride (MBP/ADV) 100 mL MBP  3.375 g IntraVENous Q8H    VANCOMYCIN INFORMATION NOTE   Other Rx Dosing/Monitoring       Objective:   Vital Signs:  Visit Vitals  BP (!) 171/96   Pulse 87   Temp 98.5 °F (36.9 °C)   Resp 15   Ht 5' 9.02\" (1.753 m)   Wt 70.8 kg (156 lb 1.4 oz)   SpO2 96%   BMI 23.04 kg/m²      O2 Device: None (Room air) Temp (24hrs), Av.3 °F (36.8 °C), Min:97.7 °F (36.5 °C), Max:98.6 °F (37 °C)           Intake/Output:     Intake/Output Summary (Last 24 hours) at 5/10/2022 0800  Last data filed at 5/10/2022 0543  Gross per 24 hour   Intake 5293.68 ml   Output 2570 ml   Net 2723.68 ml       Physical Exam:  Sleeping, awakens to voice, alert   Resps even and unlabored, symmetric chest rise on RA  Reg rate, no heave/rub  Peripheral pulses intact  Abd soft, nontender  Skin warm, dry; multiple tattoos throughout  LAKHANI      LABS AND  DATA: Personally reviewed  Recent Labs     05/10/22  0627 05/10/22  0010   WBC 17.7* 17.4*   HGB 9.1* 8.7*   HCT 27.7* 25.7*   * 458*     Recent Labs     05/10/22  0627 05/10/22  0010 22  0426 22  0031   * 135*   < > 128*   K 3.6 3.8   < > 4.1    108   < > 96*   CO2 14* 15*   < > 8*   BUN 53* 59*   < > 79*   CREA 2.95* 3.03*   < > 3.60*   * 197*   < > 979*   CA 7.4* 7.5*   < > 7.7*   MG 2.1 2.1   < > 2.8*   PHOS  --  4.0  --  7.6*    < > = values in this interval not displayed. Recent Labs     22  1813   *   TP 6.4   ALB 1.4*   GLOB 5.0*     No results for input(s): INR, PTP, APTT, INREXT, INREXT in the last 72 hours. No results for input(s): PHI, PCO2I, PO2I, FIO2I in the last 72 hours. No results for input(s): CPK, CKMB, TROIQ, BNPP in the last 72 hours. MEDS: Reviewed    Chest X-Ray:  CXR Results  (Last 48 hours)               22  XR CHEST PORT Final result    Impression:  No evidence of acute cardiopulmonary process.            Narrative:  INDICATION: Sepsis       COMPARISON: 3/8/2022       FINDINGS: AP portable imaging of the chest performed at 8:10 PM demonstrates a   stable cardiomediastinal silhouette. The lungs are clear bilaterally. No   significant osseous abnormalities are seen. Multidisciplinary Rounds Completed:  Pending    ABCDEF Bundle/Checklist Completed:  Yes    SPECIAL EQUIPMENT  None    DISPOSITION  Stay in ICU    CRITICAL CARE CONSULTANT NOTE  I had a face to face encounter with the patient, reviewed and interpreted patient data including clinical events, labs, images, vital signs, I/O's, and examined patient. I have discussed the case and the plan and management of the patient's care with the consulting services, the bedside nurses and the respiratory therapist.      NOTE OF PERSONAL INVOLVEMENT IN CARE   This patient has a high probability of imminent, clinically significant deterioration, which requires the highest level of preparedness to intervene urgently. I participated in the decision-making and personally managed or directed the management of the following life and organ supporting interventions that required my frequent assessment to treat or prevent imminent deterioration. I personally spent 35 minutes of critical care time. This is time spent at this critically ill patient's bedside actively involved in patient care as well as the coordination of care. This does not include any procedural time which has been billed separately.     Dhiraj Chacon MD  Intensivist  5/10/2022

## 2022-05-10 NOTE — PROGRESS NOTES
1900: Report received from Radha Rapp, [address] [place] [address] Road: Pt assessed, drowsy but easily aroused. Reports some discomfort from Beard, stomach is noticeably less distended and soft on palpation. Draining yellow/clear urine with heavy sediment. Tylenol given. Turning self. 0000: Pt reassessed, reporting some nausea. Problem: Body Temperature -  Risk of, Imbalanced  Goal: *Absence of heat stress or hyperthermia signs and symptoms  Outcome: Progressing Towards Goal  Goal: *Absence of cold stress or hypothermia signs and symptoms  Outcome: Progressing Towards Goal     Problem: Body Temperature -  Risk of, Imbalanced  Goal: *Absence of heat stress or hyperthermia signs and symptoms  Outcome: Progressing Towards Goal  Goal: *Absence of cold stress or hypothermia signs and symptoms  Outcome: Progressing Towards Goal     Problem: Patient Education: Go to Patient Education Activity  Goal: Patient/Family Education  Outcome: Progressing Towards Goal     Problem: Falls - Risk of  Goal: *Absence of Falls  Description: Document Shannon Fall Risk and appropriate interventions in the flowsheet.   Outcome: Progressing Towards Goal  Note: Fall Risk Interventions:  Mobility Interventions: Bed/chair exit alarm    Mentation Interventions: Adequate sleep, hydration, pain control,Bed/chair exit alarm    Medication Interventions: Assess postural VS orthostatic hypotension,Bed/chair exit alarm    Elimination Interventions: Bed/chair exit alarm,Call light in reach              Problem: Patient Education: Go to Patient Education Activity  Goal: Patient/Family Education  Outcome: Progressing Towards Goal     Problem: Urinary Tract Infection - Adult  Goal: *Absence of infection signs and symptoms  Outcome: Progressing Towards Goal     Problem: Patient Education: Go to Patient Education Activity  Goal: Patient/Family Education  Outcome: Progressing Towards Goal     Problem: Pressure Injury - Risk of  Goal: *Prevention of pressure injury  Description: Document Corey Scale and appropriate interventions in the flowsheet.   Outcome: Progressing Towards Goal  Note: Pressure Injury Interventions:  Sensory Interventions: Assess need for specialty bed,Assess changes in LOC    Moisture Interventions: Absorbent underpads    Activity Interventions: Assess need for specialty bed    Mobility Interventions: Assess need for specialty bed    Nutrition Interventions: Document food/fluid/supplement intake    Friction and Shear Interventions: Apply protective barrier, creams and emollients                Problem: Patient Education: Go to Patient Education Activity  Goal: Patient/Family Education  Outcome: Progressing Towards Goal

## 2022-05-10 NOTE — PROGRESS NOTES
4448 Interdisciplinary team rounds were held 5/10/2022 with the following team members:Care Management, Diabetes Treatment Specialist, Nursing, Nutrition, Pharmacy and Physician. Plan of care discussed. See clinical pathway and/or care plan for interventions and desired outcomes. Goals of the Day: close anion gap. Monitor labs and titrate insulin drip. Changing multiplier on glucostabilizer to .3 per diabetes team member and MD.   1123 blood sugar 231. With multiplier at .3, glucostabilizer had drip at 51cc/hr. Discussed with Dr. Walter Ace and diabetes treatment nurse, multiplier changed to .1  1629 Gap closed X 2. Discussed with Dr. Walter Ace and with Harsh Craven from Diabetes management. Order placed for 14 units of lantus to transition patient off of insulin drip. 1650 Lantus 14 units sub-q given at this time. 1854 Insulin drip stopped at this time. 1905 End of Shift Note    Bedside shift change report given to Christos Finch RN (oncoming nurse) by Jose Antonio Mccall RN (offgoing nurse).   Report included the following information SBAR, Kardex, Intake/Output, MAR and Recent Results    Shift worked:  7am to [facility]     Shift summary and any significant changes:     see above note     Concerns for physician to address:  see above note     Zone phone for oncoming shift:   n/a       Activity:  Activity Level: Bed Rest  Number times ambulated in hallways past shift: 0  Number of times OOB to chair past shift: 0    Cardiac:   Cardiac Monitoring: Yes      Cardiac Rhythm: Sinus Rhythm    Access:   Current line(s): PIV     Genitourinary:   Urinary status: hansen    Respiratory:   O2 Device: None (Room air)  Chronic home O2 use?: NO  Incentive spirometer at bedside: NO       GI:  Last Bowel Movement Date: 05/09/22  Current diet:  ADULT DIET Regular; 3 carb choices (45 gm/meal)  Passing flatus: YES  Tolerating current diet: YES       Pain Management:   Patient states pain is manageable on current regimen: YES    Skin:  Corey Score: 13  Interventions: turn team and float heels    Patient Safety:  Fall Score:  Total Score: 3  Interventions: pt to call before getting OOB  High Fall Risk: Yes    Length of Stay:  Expected LOS: - - -  Actual LOS: ASAD Garcia

## 2022-05-11 LAB
ADMINISTERED INITIALS, ADMINIT: NORMAL
ANION GAP SERPL CALC-SCNC: 11 MMOL/L (ref 5–15)
ANION GAP SERPL CALC-SCNC: 6 MMOL/L (ref 5–15)
ANION GAP SERPL CALC-SCNC: 9 MMOL/L (ref 5–15)
BACTERIA SPEC CULT: ABNORMAL
BACTERIA SPEC CULT: ABNORMAL
BASOPHILS # BLD: 0 K/UL (ref 0–0.1)
BASOPHILS NFR BLD: 0 % (ref 0–1)
BUN SERPL-MCNC: 40 MG/DL (ref 6–20)
BUN SERPL-MCNC: 40 MG/DL (ref 6–20)
BUN SERPL-MCNC: 43 MG/DL (ref 6–20)
BUN/CREAT SERPL: 16 (ref 12–20)
CALCIUM SERPL-MCNC: 8 MG/DL (ref 8.5–10.1)
CALCIUM SERPL-MCNC: 8.1 MG/DL (ref 8.5–10.1)
CALCIUM SERPL-MCNC: 8.5 MG/DL (ref 8.5–10.1)
CC UR VC: ABNORMAL
CC UR VC: ABNORMAL
CHLORIDE SERPL-SCNC: 105 MMOL/L (ref 97–108)
CHLORIDE SERPL-SCNC: 107 MMOL/L (ref 97–108)
CHLORIDE SERPL-SCNC: 110 MMOL/L (ref 97–108)
CO2 SERPL-SCNC: 17 MMOL/L (ref 21–32)
CO2 SERPL-SCNC: 18 MMOL/L (ref 21–32)
CO2 SERPL-SCNC: 20 MMOL/L (ref 21–32)
CREAT SERPL-MCNC: 2.5 MG/DL (ref 0.7–1.3)
CREAT SERPL-MCNC: 2.53 MG/DL (ref 0.7–1.3)
CREAT SERPL-MCNC: 2.69 MG/DL (ref 0.7–1.3)
D50 ADMINISTERED, D50ADM: 0 ML
D50 ORDER, D50ORD: 0 ML
DIFFERENTIAL METHOD BLD: ABNORMAL
EOSINOPHIL # BLD: 0.1 K/UL (ref 0–0.4)
EOSINOPHIL NFR BLD: 1 % (ref 0–7)
ERYTHROCYTE [DISTWIDTH] IN BLOOD BY AUTOMATED COUNT: 14.1 % (ref 11.5–14.5)
GLSCOM COMMENTS: NORMAL
GLUCOSE BLD STRIP.AUTO-MCNC: 103 MG/DL (ref 65–117)
GLUCOSE BLD STRIP.AUTO-MCNC: 117 MG/DL (ref 65–117)
GLUCOSE BLD STRIP.AUTO-MCNC: 139 MG/DL (ref 65–117)
GLUCOSE BLD STRIP.AUTO-MCNC: 140 MG/DL (ref 65–117)
GLUCOSE BLD STRIP.AUTO-MCNC: 144 MG/DL (ref 65–117)
GLUCOSE BLD STRIP.AUTO-MCNC: 158 MG/DL (ref 65–117)
GLUCOSE BLD STRIP.AUTO-MCNC: 169 MG/DL (ref 65–117)
GLUCOSE BLD STRIP.AUTO-MCNC: 170 MG/DL (ref 65–117)
GLUCOSE BLD STRIP.AUTO-MCNC: 172 MG/DL (ref 65–117)
GLUCOSE BLD STRIP.AUTO-MCNC: 197 MG/DL (ref 65–117)
GLUCOSE BLD STRIP.AUTO-MCNC: 211 MG/DL (ref 65–117)
GLUCOSE BLD STRIP.AUTO-MCNC: 232 MG/DL (ref 65–117)
GLUCOSE BLD STRIP.AUTO-MCNC: 283 MG/DL (ref 65–117)
GLUCOSE BLD STRIP.AUTO-MCNC: 355 MG/DL (ref 65–117)
GLUCOSE BLD STRIP.AUTO-MCNC: 414 MG/DL (ref 65–117)
GLUCOSE SERPL-MCNC: 125 MG/DL (ref 65–100)
GLUCOSE SERPL-MCNC: 203 MG/DL (ref 65–100)
GLUCOSE SERPL-MCNC: 366 MG/DL (ref 65–100)
GLUCOSE, GLC: 103 MG/DL
GLUCOSE, GLC: 117 MG/DL
GLUCOSE, GLC: 139 MG/DL
GLUCOSE, GLC: 140 MG/DL
GLUCOSE, GLC: 144 MG/DL
GLUCOSE, GLC: 158 MG/DL
GLUCOSE, GLC: 169 MG/DL
GLUCOSE, GLC: 170 MG/DL
GLUCOSE, GLC: 172 MG/DL
GLUCOSE, GLC: 197 MG/DL
GLUCOSE, GLC: 211 MG/DL
GLUCOSE, GLC: 232 MG/DL
GLUCOSE, GLC: 283 MG/DL
GLUCOSE, GLC: 355 MG/DL
GLUCOSE, GLC: 414 MG/DL
HCT VFR BLD AUTO: 28.7 % (ref 36.6–50.3)
HGB BLD-MCNC: 9.4 G/DL (ref 12.1–17)
HIGH TARGET, HITG: 250 MG/DL
IMM GRANULOCYTES # BLD AUTO: 0.1 K/UL (ref 0–0.04)
IMM GRANULOCYTES NFR BLD AUTO: 1 % (ref 0–0.5)
INSULIN ADMINSTERED, INSADM: 0 UNITS/HOUR
INSULIN ADMINSTERED, INSADM: 0 UNITS/HOUR
INSULIN ADMINSTERED, INSADM: 0.5 UNITS/HOUR
INSULIN ADMINSTERED, INSADM: 0.6 UNITS/HOUR
INSULIN ADMINSTERED, INSADM: 1.6 UNITS/HOUR
INSULIN ADMINSTERED, INSADM: 2.5 UNITS/HOUR
INSULIN ADMINSTERED, INSADM: 2.9 UNITS/HOUR
INSULIN ADMINSTERED, INSADM: 6.9 UNITS/HOUR
INSULIN ADMINSTERED, INSADM: 7.1 UNITS/HOUR
INSULIN ADMINSTERED, INSADM: 8.9 UNITS/HOUR
INSULIN ADMINSTERED, INSADM: 8.9 UNITS/HOUR
INSULIN ORDER, INSORD: 0 UNITS/HOUR
INSULIN ORDER, INSORD: 0 UNITS/HOUR
INSULIN ORDER, INSORD: 0.5 UNITS/HOUR
INSULIN ORDER, INSORD: 0.6 UNITS/HOUR
INSULIN ORDER, INSORD: 1.6 UNITS/HOUR
INSULIN ORDER, INSORD: 2.5 UNITS/HOUR
INSULIN ORDER, INSORD: 2.9 UNITS/HOUR
INSULIN ORDER, INSORD: 6.9 UNITS/HOUR
INSULIN ORDER, INSORD: 7.1 UNITS/HOUR
INSULIN ORDER, INSORD: 8.9 UNITS/HOUR
INSULIN ORDER, INSORD: 8.9 UNITS/HOUR
LOW TARGET, LOT: 150 MG/DL
LYMPHOCYTES # BLD: 1.5 K/UL (ref 0.8–3.5)
LYMPHOCYTES NFR BLD: 13 % (ref 12–49)
MAGNESIUM SERPL-MCNC: 2.1 MG/DL (ref 1.6–2.4)
MAGNESIUM SERPL-MCNC: 2.1 MG/DL (ref 1.6–2.4)
MCH RBC QN AUTO: 28.7 PG (ref 26–34)
MCHC RBC AUTO-ENTMCNC: 32.8 G/DL (ref 30–36.5)
MCV RBC AUTO: 87.8 FL (ref 80–99)
MINUTES UNTIL NEXT BG, NBG: 60 MIN
MONOCYTES # BLD: 0.6 K/UL (ref 0–1)
MONOCYTES NFR BLD: 5 % (ref 5–13)
MULTIPLIER, MUL: 0
MULTIPLIER, MUL: 0.01
MULTIPLIER, MUL: 0.02
MULTIPLIER, MUL: 0.02
MULTIPLIER, MUL: 0.03
MULTIPLIER, MUL: 0.04
MULTIPLIER, MUL: 0.04
NEUTS SEG # BLD: 9.2 K/UL (ref 1.8–8)
NEUTS SEG NFR BLD: 80 % (ref 32–75)
NRBC # BLD: 0 K/UL (ref 0–0.01)
NRBC BLD-RTO: 0 PER 100 WBC
ORDER INITIALS, ORDINIT: NORMAL
PHOSPHATE SERPL-MCNC: 4 MG/DL (ref 2.6–4.7)
PLATELET # BLD AUTO: 468 K/UL (ref 150–400)
PMV BLD AUTO: 9.7 FL (ref 8.9–12.9)
POTASSIUM SERPL-SCNC: 3.5 MMOL/L (ref 3.5–5.1)
POTASSIUM SERPL-SCNC: 3.9 MMOL/L (ref 3.5–5.1)
POTASSIUM SERPL-SCNC: 3.9 MMOL/L (ref 3.5–5.1)
RBC # BLD AUTO: 3.27 M/UL (ref 4.1–5.7)
SERVICE CMNT-IMP: ABNORMAL
SERVICE CMNT-IMP: NORMAL
SERVICE CMNT-IMP: NORMAL
SODIUM SERPL-SCNC: 133 MMOL/L (ref 136–145)
SODIUM SERPL-SCNC: 134 MMOL/L (ref 136–145)
SODIUM SERPL-SCNC: 136 MMOL/L (ref 136–145)
WBC # BLD AUTO: 11.4 K/UL (ref 4.1–11.1)

## 2022-05-11 PROCEDURE — 74011636637 HC RX REV CODE- 636/637: Performed by: NURSE PRACTITIONER

## 2022-05-11 PROCEDURE — 36415 COLL VENOUS BLD VENIPUNCTURE: CPT

## 2022-05-11 PROCEDURE — 74011000258 HC RX REV CODE- 258: Performed by: INTERNAL MEDICINE

## 2022-05-11 PROCEDURE — 74011000250 HC RX REV CODE- 250: Performed by: NURSE PRACTITIONER

## 2022-05-11 PROCEDURE — 74011000258 HC RX REV CODE- 258: Performed by: NURSE PRACTITIONER

## 2022-05-11 PROCEDURE — 82962 GLUCOSE BLOOD TEST: CPT

## 2022-05-11 PROCEDURE — 74011250636 HC RX REV CODE- 250/636: Performed by: INTERNAL MEDICINE

## 2022-05-11 PROCEDURE — 74011250636 HC RX REV CODE- 250/636: Performed by: NURSE PRACTITIONER

## 2022-05-11 PROCEDURE — 80048 BASIC METABOLIC PNL TOTAL CA: CPT

## 2022-05-11 PROCEDURE — 74011250637 HC RX REV CODE- 250/637: Performed by: INTERNAL MEDICINE

## 2022-05-11 PROCEDURE — 99232 SBSQ HOSP IP/OBS MODERATE 35: CPT | Performed by: CLINICAL NURSE SPECIALIST

## 2022-05-11 PROCEDURE — 65620000000 HC RM CCU GENERAL

## 2022-05-11 PROCEDURE — 74011250637 HC RX REV CODE- 250/637: Performed by: NURSE PRACTITIONER

## 2022-05-11 PROCEDURE — 83735 ASSAY OF MAGNESIUM: CPT

## 2022-05-11 PROCEDURE — 85025 COMPLETE CBC W/AUTO DIFF WBC: CPT

## 2022-05-11 PROCEDURE — 74011000250 HC RX REV CODE- 250: Performed by: INTERNAL MEDICINE

## 2022-05-11 PROCEDURE — 84100 ASSAY OF PHOSPHORUS: CPT

## 2022-05-11 RX ORDER — CALCIUM CARBONATE 200(500)MG
200 TABLET,CHEWABLE ORAL
Status: DISCONTINUED | OUTPATIENT
Start: 2022-05-11 | End: 2022-05-11

## 2022-05-11 RX ORDER — CALCIUM CARBONATE 200(500)MG
200 TABLET,CHEWABLE ORAL AS NEEDED
Status: DISCONTINUED | OUTPATIENT
Start: 2022-05-11 | End: 2022-05-14 | Stop reason: HOSPADM

## 2022-05-11 RX ORDER — MAGNESIUM SULFATE 100 %
4 CRYSTALS MISCELLANEOUS AS NEEDED
Status: DISCONTINUED | OUTPATIENT
Start: 2022-05-11 | End: 2022-05-12

## 2022-05-11 RX ORDER — INSULIN LISPRO 100 [IU]/ML
INJECTION, SOLUTION INTRAVENOUS; SUBCUTANEOUS
Status: DISCONTINUED | OUTPATIENT
Start: 2022-05-11 | End: 2022-05-12

## 2022-05-11 RX ORDER — SODIUM CHLORIDE 9 MG/ML
150 INJECTION, SOLUTION INTRAVENOUS CONTINUOUS
Status: DISCONTINUED | OUTPATIENT
Start: 2022-05-11 | End: 2022-05-12

## 2022-05-11 RX ADMIN — TAMSULOSIN HYDROCHLORIDE 0.4 MG: 0.4 CAPSULE ORAL at 08:19

## 2022-05-11 RX ADMIN — CARVEDILOL 12.5 MG: 12.5 TABLET, FILM COATED ORAL at 16:36

## 2022-05-11 RX ADMIN — ACETAMINOPHEN 325MG 650 MG: 325 TABLET ORAL at 15:33

## 2022-05-11 RX ADMIN — HEPARIN SODIUM 5000 UNITS: 5000 INJECTION INTRAVENOUS; SUBCUTANEOUS at 08:19

## 2022-05-11 RX ADMIN — PIPERACILLIN AND TAZOBACTAM 3.38 G: 3; .375 INJECTION, POWDER, LYOPHILIZED, FOR SOLUTION INTRAVENOUS at 04:36

## 2022-05-11 RX ADMIN — SODIUM CHLORIDE 7.1 UNITS/HR: 9 INJECTION, SOLUTION INTRAVENOUS at 08:11

## 2022-05-11 RX ADMIN — CALCIUM CARBONATE (ANTACID) CHEW TAB 500 MG 200 MG: 500 CHEW TAB at 21:50

## 2022-05-11 RX ADMIN — SODIUM CHLORIDE 150 ML/HR: 9 INJECTION, SOLUTION INTRAVENOUS at 08:00

## 2022-05-11 RX ADMIN — SODIUM CHLORIDE 150 ML/HR: 9 INJECTION, SOLUTION INTRAVENOUS at 18:22

## 2022-05-11 RX ADMIN — SODIUM CHLORIDE, PRESERVATIVE FREE 10 ML: 5 INJECTION INTRAVENOUS at 13:49

## 2022-05-11 RX ADMIN — ONDANSETRON 4 MG: 2 INJECTION INTRAMUSCULAR; INTRAVENOUS at 04:33

## 2022-05-11 RX ADMIN — HEPARIN SODIUM 5000 UNITS: 5000 INJECTION INTRAVENOUS; SUBCUTANEOUS at 15:24

## 2022-05-11 RX ADMIN — PROCHLORPERAZINE EDISYLATE 5 MG: 5 INJECTION INTRAMUSCULAR; INTRAVENOUS at 06:58

## 2022-05-11 RX ADMIN — CARVEDILOL 12.5 MG: 12.5 TABLET, FILM COATED ORAL at 08:19

## 2022-05-11 RX ADMIN — SODIUM CHLORIDE, PRESERVATIVE FREE 10 ML: 5 INJECTION INTRAVENOUS at 21:47

## 2022-05-11 RX ADMIN — Medication 1 AMPULE: at 10:22

## 2022-05-11 RX ADMIN — PIPERACILLIN AND TAZOBACTAM 3.38 G: 3; .375 INJECTION, POWDER, LYOPHILIZED, FOR SOLUTION INTRAVENOUS at 13:49

## 2022-05-11 RX ADMIN — SODIUM CHLORIDE 500 ML: 9 INJECTION, SOLUTION INTRAVENOUS at 07:59

## 2022-05-11 RX ADMIN — HEPARIN SODIUM 5000 UNITS: 5000 INJECTION INTRAVENOUS; SUBCUTANEOUS at 00:23

## 2022-05-11 RX ADMIN — INSULIN LISPRO 3 UNITS: 100 INJECTION, SOLUTION INTRAVENOUS; SUBCUTANEOUS at 16:36

## 2022-05-11 RX ADMIN — Medication 1 AMPULE: at 20:34

## 2022-05-11 RX ADMIN — CALCIUM CARBONATE (ANTACID) CHEW TAB 500 MG 200 MG: 500 CHEW TAB at 00:41

## 2022-05-11 RX ADMIN — ONDANSETRON 4 MG: 2 INJECTION INTRAMUSCULAR; INTRAVENOUS at 15:33

## 2022-05-11 RX ADMIN — INSULIN LISPRO 3 UNITS: 100 INJECTION, SOLUTION INTRAVENOUS; SUBCUTANEOUS at 08:09

## 2022-05-11 RX ADMIN — CEFAZOLIN SODIUM 1000 MG: 1 INJECTION, POWDER, FOR SOLUTION INTRAMUSCULAR; INTRAVENOUS at 15:24

## 2022-05-11 RX ADMIN — INSULIN LISPRO 3 UNITS: 100 INJECTION, SOLUTION INTRAVENOUS; SUBCUTANEOUS at 11:36

## 2022-05-11 RX ADMIN — SODIUM CHLORIDE, PRESERVATIVE FREE 10 ML: 5 INJECTION INTRAVENOUS at 06:48

## 2022-05-11 NOTE — DIABETES MGMT
[address] [facility]    CLINICAL NURSE SPECIALIST CONSULT     Initial Presentation   Lennox Mowers is a 46 y.o. male admitted from the ER with altered mental status. Found to have >999 cc in bladder => Beard placed. LAB: BG 1335 with incalculable AG. Urinary glucose & ketone +. WBC 16.8. Hyponatremic. K 7.3. FELECIA. CXR: No acute process noted  CT ABd: Markedly distended urinary bladder and moderately severe right-sided hydronephrosis despite the presence of a Beard catheter; correlate with Beard catheter function. Air throughout the urinary bladder wall is suspicious for emphysematous cystitis given the clinical finding of sepsis. HX:   Past Medical History:   Diagnosis Date    Bipolar 1 disorder, depressed ([address]     Bipolar disorder ([address]     Depression     Diabetes ([address]     DKA, type 1 ([address] 1/27/2013    diagnosed age 23    H/O noncompliance with medical treatment, presenting hazards to health     MRSA (methicillin resistant staph aureus) culture positive     MRSA (methicillin resistant Staphylococcus aureus)     Face    Noncompliance with medication regimen     Second hand smoke exposure     Seizure ([address]     Seizures ([address] 2006 or 2007    one episode during senior care      INITIAL DX:   DKA (diabetic ketoacidosis) ([address] [E11.10]     Current Treatment     TX: Insulin. IVF. Heparin. ABx. BP management    Consulted by Provider for advanced diabetes nursing assessment and care for:   [x] Transitioning off Austine Ou   [x] Inpatient management strategy  [x] Home management assessment  [] Survival skill education    Hospital Course   Clinical progress has been complicated by need for ICU level of care. 5/9/22  retroperitoneum: The right kidney measures 13.6 cm in sagittal length. The left kidney measures 13.8 cm in sagittal length. There is no hydronephrosis. No renal cyst, calculus or mass is visualized.  The abdominal aorta and aortic bifurcation are not well visualized due to overlying bowel gas. The visualized IVC is normal. The urinary bladder is incompletely distended and a Beard catheter is present. 5/10/22 Alert & oriented today. NPO. Beard+. Frequent stools. 5/11/22 Alert & oriented. Ate dinner last evening and did not have mealtime insulin ordered. BG marco to 400s. No AG. Luiz Yu restarted this morning. Diabetes History   Per EMR, patient has Type 1 diabetes since age 23, and positive family history in mother and niece. Admission BG 1335. A1c 11.4% (3/8/22). Diabetes care per PCP, but has appointment to see endocrinologist in June. Diabetes-related Medical History  Acute complications  DKA  Other associated conditions     Depression    Diabetes Medication History  Key Antihyperglycemic Medications             insulin aspart U-100 (NovoLOG Flexpen U-100 Insulin) 100 unit/mL (3 mL) inpn (Taking) 2 Units by SubCUTAneous route Before breakfast, lunch, and dinner. Lantus insulin 16 units D     Diabetes self-management practices: Patient reports taking standard doses of basal and mealtime insulin. He is using a [employer] CGM system and corrects when he sees high blood glucoses; he gives as much as 20 units to correct. He is injecting into his belly and has evidence of overuse in the areas adjacent to his umbilicus. Patient eats a lunch & evening meal. Drinks coffee and unsweetened tea. Subjective   \"Yes, I ate dinner last evening. \"     Objective   Physical exam  General Normal weight male who is in no acute distress  Neuro  Alert & oriented  Vital Signs   Visit Vitals  BP (!) 158/97   Pulse 81   Temp 98.6 °F (37 °C)   Resp 13   Ht 5' 9.02\" (1.753 m)   Wt 70.8 kg (156 lb 1.4 oz)   SpO2 97%   BMI 23.04 kg/m²     Laboratory  Recent Labs     05/11/22  0521 05/11/22  0436 05/10/22  1438 05/10/22  1032 05/10/22  0627 05/10/22  0627 05/10/22  0010 05/10/22  0010 05/09/22  0031 05/08/22  1813   *  --  98 236*   < > 264*   < > 197*   < > 1,335* AGAP 11  --  7 10   < > 14   < > 12   < > Cannot be calculated   WBC  --  11.4*  --   --   --  17.7*  --  17.4*   < > 16.8*   CREA 2.69*  --  2.90* 3.06*   < > 2.95*   < > 3.03*   < > 3.76*   GFRNA 27*  --  24* 23*   < > 24*   < > 23*   < > 18*   AST  --   --   --   --   --   --   --   --   --  17   ALT  --   --   --   --   --   --   --   --   --  28    < > = values in this interval not displayed. Factors impacting BG management  Factor Dose Comments   Nutrition:  Standard meals   Carb controlled 45 gms/meal   Ate dinner last evening and breakfast this morning; did not receive mealtime insulin   Infection Zosyn Q8 hrs Afebrile. WBC elevated  Blood cultures negative. Urine with gram negative rods   Other:   Kidney function  Liver function   FELECIA  Liver enzymes normal      Blood glucose pattern      Significant diabetes-related events over the past 24-72 hours  Admitted in DKA with BG 1335 and incalculable AG. Received fluid resuscitation & insulin  BG <250mg/dl @10am 5/9/22 => D5/.45NaCl started @100cc/hr  AG closed 5/9/22 X2 @530pm 5/9/22 => not transitioned off [address] has reopened 630am 5/10/22 => switched to D10 @ 50cc/hr @8am  Transitioned off GS @5pm. Given food without mealtime coverage. BGs marco to 300-400s  Placed back on GS @9am today    Assessment and Plan   Nursing Diagnosis Risk for unstable blood glucose pattern   Nursing Intervention Domain 5005 Decision-making Support   Nursing Interventions Examined current inpatient diabetes/blood glucose control   Explored factors facilitating and impeding inpatient management  Explored corrective strategies with patient and responsible inpatient provider   Informed patient of rational for insulin strategy while hospitalized     Evaluation   This normal weight  male with known Type 1 diabetes was admitted in DKA, as evidenced by BG 1335 & incalculable AG. In our conversation today, he reports taking his insulins regularly.  He has been using a [place] CGM system to detect BG patterns and address accordingly. He was surprised by this event and the need for hospitalization. Blood glucoses came down 5/9/22 with a significant amount of insulin (578.9 ml). Patient's AG reopened and IVFs were switched to D10 5/10/22 morning and the multiplier changed. Insulin ran at about 13 units/hr until the [address] closed X2 @230pm 5/10/22. Patient was transitioned off insulin infusion. Since the patient has Type 1 diabetes, it was recommended that both basal insulin and mealtime insulin be started. Mealtime insulin was not ordered. BG marco. GS was restarted this morning. It is important that this gentleman receive both basal and bolus insulins. No changes in recommendations from yesterday. Recommendations     Use of Subcutaneous Insulin Order set (8871)  Insulin Dosing Specific recommendation   Basal                                      (Based on weight, BMI & GFR) [x]        0.2 units/kg/D  [] 0.3 units/kg/D  [] 0.4 units/kg/D Transition off glucostabilizer with 14 units Lantus. Nutritional                                      (Based on CHO/dextrose load) [x] Normal sensitivity  [] Insulin-resistant sensitivity Humalog insulin 4 units with consumed meals   Corrective                                       (Useful in adjusting insulin dosing) [] Normal sensitivity  [x] HIGH sensitivity  [] Insulin-resistant sensitivity      Billing Code(s)   [x] 25 minutes    Before making these care recommendations, I personally reviewed the hospitalization record, including notes, laboratory & diagnostic data and current medications, and examined the patient at the bedside (circumstances permitting) before making care recommendations. More than fifty (50) percent of the time was spent in patient counseling and/or care coordination.   Total minutes: [address] [facility] [place], CNS  Diabetes Clinical Nurse Specialist  [facility]  Access via [address] [place] [address]

## 2022-05-11 NOTE — PROGRESS NOTES
[facility]    ICU TEAM Progress Note    Name: Lindy Montaño   : 1980   MRN: [id number]   Date: 2022           ICU Assessment & Plan of Emely Ray Is a 46 y.o. male with bipolar disorder, HTN, DM1 admitted  with DKA. NEURO  #. Pain, agitation, delirium  - tylenol prn      CARDIAC  No acute      RESPIRATORY  No acute      RENAL  #. FELECIA  #. Emphysematous cystitis, R hydro  - Urology consulted; greatly appreciate assistance  - Beard to stay until outpatient appt  - e- repletion prn      GASTROINTESTINAL  #. At risk for malnutrition  - resume diet  - Nutrition following      HEMATOLOGIC  #. Anemia, likely AoCD  - no indication for transfusion at this time; monitor      ID  #. Leukocytosis, elevated procal  - GNRs in UCx   - empiric zosyn pending cx data      ENDOCRINE  #. T1DM with DKA  - DKA protocol, currently on insulin gtt   - gap reopened this AM after gap closed x2 yesterday  - cont insulin for another two BMPs with gap closed      DVT Prophylaxis: SCD's, heparin  U - Ulcer Prophylaxis: not indicated  G - Glycemic Control: Insulin  B - Bowel Regimen: miralax prn  Tubes: None  Lines: Peripheral IV  Drains: None    Subjective:   Progress Note: 2022      Reason for ICU Admission: DKA     HPI: (per prior clinicians) 45 y/o with pmh of bipolar disorder, HTN, DM1 presents to [address] [facility] ICU for AMS. Upon arrival HDS, lethargic and disoriented, opens eyes to voice and follows commands. Lab work done and significant for pH 6.95 with a bicarb of 3. Glucose 1335,  (corrected 136), K 7.3, Cr 3.76, WBC 16.8. Initially temp unable to be obtained, core temp taken and showed a temp of 88.9 deg F.  Placed on warming blanket. Given 2 L IVF, calcium, and started on an insulin gtt. ICU consulted for admission.     Upon my arrival pt still HDS and lethargic and disoriented. Physical exam significant for lower abdomen distention, rigidity and tenderness.   Bladder scan done and showed >999. Beard placed. Will get CT abd to r/o intra abdominal source of infection. Pt will be admitted to the ICU for further management.     Called back to bedside bc pt agitated and pulled out one of his IVs,unable to to get IV access. Also [address]. Upon my assessment pt agitated, somewhat redirectable. I placed a ultrasound guided 20 karissa IV in the rt basilic vein and severo all labs. I accompanied pt and nurse to CT (to r/o intra abdominal source of infection) since BP borderline. Pt then transferred to ICU without complication. Overnight Events:   5/9: Gap decreasing. 5/10: gap closed yesterday evening but left on gtt to transition in AM. Gap reopened this AM.    5/11: attempted to transition yesterday evening after two closed gaps, but reopened again this morning. Back on insulin gtt. Diabetes Mgt following. Home Medications:     Prior to Admission medications    Medication Sig Start Date End Date Taking? Authorizing Provider   tamsulosin (FLOMAX) 0.4 mg capsule Take 0.4 mg by mouth daily. Yes Provider, Historical   insulin aspart U-100 (NovoLOG Flexpen U-100 Insulin) 100 unit/mL (3 mL) inpn 2 Units by SubCUTAneous route Before breakfast, lunch, and dinner. 3/12/22  Yes Risa Feliciano MD   ondansetron (ZOFRAN ODT) 8 mg disintegrating tablet Take 1 Tablet by mouth every eight (8) hours as needed for Nausea or Vomiting. 3/3/22  Yes Angelito Carter, DO   pen needle, diabetic 30 gauge x 3/16\" ndle 5 Units by Does Not Apply route Before breakfast, lunch, and dinner. 2/23/22  Yes Zachary Lopez MD   amLODIPine (NORVASC) 5 mg tablet Take 1 Tablet by mouth daily. 2/15/22  Yes Salome Shirley MD   carvediloL (COREG) 12.5 mg tablet Take 1 Tablet by mouth two (2) times daily (with meals). 2/14/22  Yes Salome Shirley MD   finasteride (PROSCAR) 5 mg tablet Take 1 Tablet by mouth daily.  2/15/22  Yes Salome Shirley MD   rosuvastatin (CRESTOR) 40 mg tablet Take 1 Tablet by mouth nightly. 1/27/22  Yes Noni Doe MD   ondansetron (ZOFRAN ODT) 4 mg disintegrating tablet Take 1 Tablet by mouth every eight (8) hours as needed for Nausea or Vomiting. 12/28/21  Yes Nikolay Carranza MD   DULoxetine (CYMBALTA) 60 mg capsule Take 1 Capsule by mouth daily. 12/13/21  Yes Noni Doe MD   pantoprazole (Protonix) 40 mg tablet Take 1 Tablet by mouth daily. 12/6/21  Yes Deyvi Sanderson MD   pregabalin (Lyrica) 75 mg capsule Take 75 mg by mouth two (2) times a day. Yes Provider, Historical   naloxone (Narcan) 4 mg/actuation nasal spray Use 1 spray intranasally, then discard. Repeat with new spray every 2 min as needed for opioid overdose symptoms, alternating nostrils.  2/23/22   Noni Doe MD   ergocalciferol (ERGOCALCIFEROL) 1,250 mcg (50,000 unit) capsule Take 1 capsule by mouth once a week  Patient not taking: Reported on 5/10/2022 10/30/21   Noni Doe MD       Current Meds:     Current Facility-Administered Medications   Medication Dose Route Frequency    calcium carbonate (TUMS) chewable tablet 200 mg [elemental]  200 mg Oral PRN    prochlorperazine (COMPAZINE) with saline injection 5 mg  5 mg IntraVENous Q4H PRN    0.9% sodium chloride infusion  150 mL/hr IntraVENous CONTINUOUS    insulin regular (NOVOLIN R, HUMULIN R) 100 Units in 0.9% sodium chloride 100 mL infusion  0-50 Units/hr IntraVENous TITRATE    insulin lispro (HUMALOG) injection   SubCUTAneous TIDAC    glucose chewable tablet 16 g  4 Tablet Oral PRN    dextrose 10 % infusion 0-250 mL  0-250 mL IntraVENous PRN    glucagon (GLUCAGEN) injection 1 mg  1 mg IntraMUSCular PRN    alcohol 62% (NOZIN) nasal  1 Ampule  1 Ampule Topical Q12H    glucose chewable tablet 16 g  4 Tablet Oral PRN    glucagon (GLUCAGEN) injection 1 mg  1 mg IntraMUSCular PRN    dextrose 10% infusion 0-250 mL  0-250 mL IntraVENous PRN    amLODIPine (NORVASC) tablet 5 mg  5 mg Oral DAILY    carvediloL (COREG) tablet 12.5 mg  12.5 mg Oral BID WITH MEALS    tamsulosin (FLOMAX) capsule 0.4 mg  0.4 mg Oral DAILY    heparin (porcine) injection 5,000 Units  5,000 Units SubCUTAneous Q8H    sodium chloride (NS) flush 5-40 mL  5-40 mL IntraVENous Q8H    sodium chloride (NS) flush 5-40 mL  5-40 mL IntraVENous PRN    acetaminophen (TYLENOL) tablet 650 mg  650 mg Oral Q6H PRN    Or    acetaminophen (TYLENOL) suppository 650 mg  650 mg Rectal Q6H PRN    polyethylene glycol (MIRALAX) packet 17 g  17 g Oral DAILY PRN    ondansetron (ZOFRAN ODT) tablet 4 mg  4 mg Oral Q8H PRN    Or    ondansetron (ZOFRAN) injection 4 mg  4 mg IntraVENous Q6H PRN    piperacillin-tazobactam (ZOSYN) 3.375 g in 0.9% sodium chloride (MBP/ADV) 100 mL MBP  3.375 g IntraVENous Q8H       Objective:   Vital Signs:  Visit Vitals  BP (!) 131/90   Pulse 77   Temp 98.6 °F (37 °C)   Resp 14   Ht 5' 9.02\" (1.753 m)   Wt 70.8 kg (156 lb 1.4 oz)   SpO2 99%   BMI 23.04 kg/m²      O2 Device: None (Room air) Temp (24hrs), Av.4 °F (36.9 °C), Min:98.1 °F (36.7 °C), Max:98.8 °F (37.1 °C)           Intake/Output:     Intake/Output Summary (Last 24 hours) at 2022 1056  Last data filed at 2022 0700  Gross per 24 hour   Intake 3055.64 ml   Output 3450 ml   Net -394.36 ml       Physical Exam:  Sleeping, awakens to voice, alert   Resps even and unlabored, symmetric chest rise on RA  Reg rate, no heave/rub  Peripheral pulses intact  Abd soft, nontender  Skin warm, dry; multiple tattoos throughout  LAKHANI      LABS AND  DATA: Personally reviewed  Recent Labs     22  0436 05/10/22  0627   WBC 11.4* 17.7*   HGB 9.4* 9.1*   HCT 28.7* 27.7*   * 475*     Recent Labs     22  0521 22  0436 05/10/22  1438 05/10/22  1032 05/10/22  1032 05/10/22  0627 05/10/22  0010   *  --  136   < > 133*   < > 135*   K 3.9  --  3.7   < > 3.4*   < > 3.8     --  109*   < > 106   < > 108   CO2 17*  --  20*   < > 17*   < > 15*   BUN 43*  --  48* < > 50*   < > 59*   CREA 2.69*  --  2.90*   < > 3.06*   < > 3.03*   *  --  98   < > 236*   < > 197*   CA 8.5  --  8.0*   < > 7.8*   < > 7.5*   MG  --   --  2.2  --  2.2   < > 2.1   PHOS  --  4.0  --   --   --   --  4.0    < > = values in this interval not displayed. Recent Labs     05/08/22  1813   *   TP 6.4   ALB 1.4*   GLOB 5.0*     No results for input(s): INR, PTP, APTT, INREXT, INREXT in the last 72 hours. No results for input(s): PHI, PCO2I, PO2I, FIO2I in the last 72 hours. No results for input(s): CPK, CKMB, TROIQ, BNPP in the last 72 hours. MEDS: Reviewed    Chest X-Ray:  CXR Results  (Last 48 hours)    None        Multidisciplinary Rounds Completed:  Pending    ABCDEF Bundle/Checklist Completed:  Yes    SPECIAL EQUIPMENT  None    DISPOSITION  Stay in ICU    CRITICAL CARE CONSULTANT NOTE  I had a face to face encounter with the patient, reviewed and interpreted patient data including clinical events, labs, images, vital signs, I/O's, and examined patient. I have discussed the case and the plan and management of the patient's care with the consulting services, the bedside nurses and the respiratory therapist.      NOTE OF PERSONAL INVOLVEMENT IN CARE   This patient has a high probability of imminent, clinically significant deterioration, which requires the highest level of preparedness to intervene urgently. I participated in the decision-making and personally managed or directed the management of the following life and organ supporting interventions that required my frequent assessment to treat or prevent imminent deterioration. I personally spent 35 minutes of critical care time. This is time spent at this critically ill patient's bedside actively involved in patient care as well as the coordination of care. This does not include any procedural time which has been billed separately.     Simeon Ashford MD  Intensivist  5/11/2022

## 2022-05-11 NOTE — PROGRESS NOTES
Problem: Body Temperature -  Risk of, Imbalanced  Goal: *Absence of heat stress or hyperthermia signs and symptoms  Outcome: Progressing Towards Goal  Goal: *Absence of cold stress or hypothermia signs and symptoms  Outcome: Progressing Towards Goal     Problem: Patient Education: Go to Patient Education Activity  Goal: Patient/Family Education  Outcome: Progressing Towards Goal     Problem: Falls - Risk of  Goal: *Absence of Falls  Description: Document Shannon Fall Risk and appropriate interventions in the flowsheet. Outcome: Progressing Towards Goal  Note: Fall Risk Interventions:  Mobility Interventions: Bed/chair exit alarm,PT Consult for mobility concerns,PT Consult for assist device competence,Strengthening exercises (ROM-active/passive)    Mentation Interventions: Adequate sleep, hydration, pain control,Bed/chair exit alarm,Evaluate medications/consider consulting pharmacy,Door open when patient unattended    Medication Interventions: Assess postural VS orthostatic hypotension,Bed/chair exit alarm,Evaluate medications/consider consulting pharmacy    Elimination Interventions: Bed/chair exit alarm,Call light in reach,Patient to call for help with toileting needs              Problem: Patient Education: Go to Patient Education Activity  Goal: Patient/Family Education  Outcome: Progressing Towards Goal     Problem: Urinary Tract Infection - Adult  Goal: *Absence of infection signs and symptoms  Outcome: Progressing Towards Goal     Problem: Patient Education: Go to Patient Education Activity  Goal: Patient/Family Education  Outcome: Progressing Towards Goal     Problem: Pressure Injury - Risk of  Goal: *Prevention of pressure injury  Description: Document Corey Scale and appropriate interventions in the flowsheet.   Outcome: Progressing Towards Goal  Note: Pressure Injury Interventions:  Sensory Interventions: Assess changes in LOC,Avoid rigorous massage over bony prominences,Discuss PT/OT consult with provider,Minimize linen layers    Moisture Interventions: Absorbent underpads,Assess need for specialty bed,Check for incontinence Q2 hours and as needed    Activity Interventions: Assess need for specialty bed,Pressure redistribution bed/mattress(bed type),PT/OT evaluation    Mobility Interventions: Assess need for specialty bed,HOB 30 degrees or less,PT/OT evaluation    Nutrition Interventions: Discuss nutritional consult with provider,Document food/fluid/supplement intake    Friction and Shear Interventions: Apply protective barrier, creams and emollients,HOB 30 degrees or less,Minimize layers                Problem: Patient Education: Go to Patient Education Activity  Goal: Patient/Family Education  Outcome: Progressing Towards Goal

## 2022-05-12 LAB
ADMINISTERED INITIALS, ADMINIT: NORMAL
ANION GAP SERPL CALC-SCNC: 7 MMOL/L (ref 5–15)
BUN SERPL-MCNC: 32 MG/DL (ref 6–20)
BUN/CREAT SERPL: 14 (ref 12–20)
CALCIUM SERPL-MCNC: 8.2 MG/DL (ref 8.5–10.1)
CHLORIDE SERPL-SCNC: 108 MMOL/L (ref 97–108)
CO2 SERPL-SCNC: 19 MMOL/L (ref 21–32)
CREAT SERPL-MCNC: 2.34 MG/DL (ref 0.7–1.3)
D50 ADMINISTERED, D50ADM: 0 ML
D50 ORDER, D50ORD: 0 ML
GLSCOM COMMENTS: NORMAL
GLUCOSE BLD STRIP.AUTO-MCNC: 164 MG/DL (ref 65–117)
GLUCOSE BLD STRIP.AUTO-MCNC: 186 MG/DL (ref 65–117)
GLUCOSE BLD STRIP.AUTO-MCNC: 191 MG/DL (ref 65–117)
GLUCOSE BLD STRIP.AUTO-MCNC: 200 MG/DL (ref 65–117)
GLUCOSE BLD STRIP.AUTO-MCNC: 202 MG/DL (ref 65–117)
GLUCOSE BLD STRIP.AUTO-MCNC: 208 MG/DL (ref 65–117)
GLUCOSE BLD STRIP.AUTO-MCNC: 214 MG/DL (ref 65–117)
GLUCOSE BLD STRIP.AUTO-MCNC: 221 MG/DL (ref 65–117)
GLUCOSE BLD STRIP.AUTO-MCNC: 230 MG/DL (ref 65–117)
GLUCOSE BLD STRIP.AUTO-MCNC: 233 MG/DL (ref 65–117)
GLUCOSE BLD STRIP.AUTO-MCNC: 246 MG/DL (ref 65–117)
GLUCOSE BLD STRIP.AUTO-MCNC: 253 MG/DL (ref 65–117)
GLUCOSE SERPL-MCNC: 176 MG/DL (ref 65–100)
GLUCOSE, GLC: 164 MG/DL
GLUCOSE, GLC: 186 MG/DL
GLUCOSE, GLC: 191 MG/DL
GLUCOSE, GLC: 202 MG/DL
GLUCOSE, GLC: 221 MG/DL
GLUCOSE, GLC: 253 MG/DL
HIGH TARGET, HITG: 250 MG/DL
INSULIN ADMINSTERED, INSADM: 0.5 UNITS/HOUR
INSULIN ORDER, INSORD: 0.5 UNITS/HOUR
LOW TARGET, LOT: 150 MG/DL
MAGNESIUM SERPL-MCNC: 2.1 MG/DL (ref 1.6–2.4)
MINUTES UNTIL NEXT BG, NBG: 120 MIN
MINUTES UNTIL NEXT BG, NBG: 60 MIN
MULTIPLIER, MUL: 0
ORDER INITIALS, ORDINIT: NORMAL
POTASSIUM SERPL-SCNC: 3.3 MMOL/L (ref 3.5–5.1)
SERVICE CMNT-IMP: ABNORMAL
SODIUM SERPL-SCNC: 134 MMOL/L (ref 136–145)

## 2022-05-12 PROCEDURE — 74011250636 HC RX REV CODE- 250/636: Performed by: NURSE PRACTITIONER

## 2022-05-12 PROCEDURE — 74011000250 HC RX REV CODE- 250: Performed by: NURSE PRACTITIONER

## 2022-05-12 PROCEDURE — 74011636637 HC RX REV CODE- 636/637: Performed by: INTERNAL MEDICINE

## 2022-05-12 PROCEDURE — 99232 SBSQ HOSP IP/OBS MODERATE 35: CPT | Performed by: CLINICAL NURSE SPECIALIST

## 2022-05-12 PROCEDURE — 65620000000 HC RM CCU GENERAL

## 2022-05-12 PROCEDURE — 74011250637 HC RX REV CODE- 250/637: Performed by: NURSE PRACTITIONER

## 2022-05-12 PROCEDURE — 74011250636 HC RX REV CODE- 250/636: Performed by: INTERNAL MEDICINE

## 2022-05-12 PROCEDURE — 82962 GLUCOSE BLOOD TEST: CPT

## 2022-05-12 PROCEDURE — 74011636637 HC RX REV CODE- 636/637: Performed by: CLINICAL NURSE SPECIALIST

## 2022-05-12 PROCEDURE — 74011000258 HC RX REV CODE- 258: Performed by: NURSE PRACTITIONER

## 2022-05-12 PROCEDURE — 97116 GAIT TRAINING THERAPY: CPT

## 2022-05-12 PROCEDURE — 97162 PT EVAL MOD COMPLEX 30 MIN: CPT

## 2022-05-12 PROCEDURE — 74011250637 HC RX REV CODE- 250/637: Performed by: INTERNAL MEDICINE

## 2022-05-12 PROCEDURE — 83735 ASSAY OF MAGNESIUM: CPT

## 2022-05-12 PROCEDURE — 74011000250 HC RX REV CODE- 250: Performed by: INTERNAL MEDICINE

## 2022-05-12 PROCEDURE — 80048 BASIC METABOLIC PNL TOTAL CA: CPT

## 2022-05-12 PROCEDURE — 97166 OT EVAL MOD COMPLEX 45 MIN: CPT

## 2022-05-12 PROCEDURE — 97535 SELF CARE MNGMENT TRAINING: CPT

## 2022-05-12 PROCEDURE — 74011636637 HC RX REV CODE- 636/637: Performed by: NURSE PRACTITIONER

## 2022-05-12 PROCEDURE — 36415 COLL VENOUS BLD VENIPUNCTURE: CPT

## 2022-05-12 RX ORDER — INSULIN GLARGINE 100 [IU]/ML
16 INJECTION, SOLUTION SUBCUTANEOUS DAILY
Status: CANCELLED | OUTPATIENT
Start: 2022-05-12

## 2022-05-12 RX ORDER — LOPERAMIDE HYDROCHLORIDE 2 MG/1
2 CAPSULE ORAL
Status: DISCONTINUED | OUTPATIENT
Start: 2022-05-12 | End: 2022-05-14 | Stop reason: HOSPADM

## 2022-05-12 RX ORDER — INSULIN LISPRO 100 [IU]/ML
4 INJECTION, SOLUTION INTRAVENOUS; SUBCUTANEOUS
Status: DISCONTINUED | OUTPATIENT
Start: 2022-05-12 | End: 2022-05-14 | Stop reason: HOSPADM

## 2022-05-12 RX ORDER — HYDRALAZINE HYDROCHLORIDE 20 MG/ML
10 INJECTION INTRAMUSCULAR; INTRAVENOUS
Status: DISCONTINUED | OUTPATIENT
Start: 2022-05-12 | End: 2022-05-14 | Stop reason: HOSPADM

## 2022-05-12 RX ORDER — INSULIN GLARGINE 100 [IU]/ML
3 INJECTION, SOLUTION SUBCUTANEOUS ONCE
Status: COMPLETED | OUTPATIENT
Start: 2022-05-12 | End: 2022-05-12

## 2022-05-12 RX ORDER — LABETALOL HYDROCHLORIDE 5 MG/ML
10 INJECTION, SOLUTION INTRAVENOUS
Status: DISCONTINUED | OUTPATIENT
Start: 2022-05-12 | End: 2022-05-14 | Stop reason: HOSPADM

## 2022-05-12 RX ORDER — INSULIN LISPRO 100 [IU]/ML
INJECTION, SOLUTION INTRAVENOUS; SUBCUTANEOUS
Status: DISCONTINUED | OUTPATIENT
Start: 2022-05-12 | End: 2022-05-14 | Stop reason: HOSPADM

## 2022-05-12 RX ORDER — DEXTROSE MONOHYDRATE AND SODIUM CHLORIDE 5; .45 G/100ML; G/100ML
125 INJECTION, SOLUTION INTRAVENOUS CONTINUOUS
Status: DISCONTINUED | OUTPATIENT
Start: 2022-05-12 | End: 2022-05-12

## 2022-05-12 RX ORDER — INSULIN GLARGINE 100 [IU]/ML
16 INJECTION, SOLUTION SUBCUTANEOUS
Status: DISCONTINUED | OUTPATIENT
Start: 2022-05-12 | End: 2022-05-14 | Stop reason: HOSPADM

## 2022-05-12 RX ADMIN — INSULIN GLARGINE 3 UNITS: 100 INJECTION, SOLUTION SUBCUTANEOUS at 12:22

## 2022-05-12 RX ADMIN — LABETALOL HYDROCHLORIDE 10 MG: 5 INJECTION INTRAVENOUS at 18:44

## 2022-05-12 RX ADMIN — SODIUM CHLORIDE 0.5 UNITS/HR: 9 INJECTION, SOLUTION INTRAVENOUS at 07:33

## 2022-05-12 RX ADMIN — INSULIN LISPRO 2 UNITS: 100 INJECTION, SOLUTION INTRAVENOUS; SUBCUTANEOUS at 16:09

## 2022-05-12 RX ADMIN — SODIUM CHLORIDE, PRESERVATIVE FREE 10 ML: 5 INJECTION INTRAVENOUS at 21:24

## 2022-05-12 RX ADMIN — LABETALOL HYDROCHLORIDE 10 MG: 5 INJECTION INTRAVENOUS at 22:25

## 2022-05-12 RX ADMIN — INSULIN GLARGINE 16 UNITS: 100 INJECTION, SOLUTION SUBCUTANEOUS at 21:05

## 2022-05-12 RX ADMIN — Medication 4 UNITS: at 12:24

## 2022-05-12 RX ADMIN — LOPERAMIDE HYDROCHLORIDE 2 MG: 2 CAPSULE ORAL at 21:05

## 2022-05-12 RX ADMIN — Medication 1 AMPULE: at 20:56

## 2022-05-12 RX ADMIN — HEPARIN SODIUM 5000 UNITS: 5000 INJECTION INTRAVENOUS; SUBCUTANEOUS at 08:54

## 2022-05-12 RX ADMIN — SODIUM CHLORIDE 150 ML/HR: 9 INJECTION, SOLUTION INTRAVENOUS at 00:27

## 2022-05-12 RX ADMIN — HEPARIN SODIUM 5000 UNITS: 5000 INJECTION INTRAVENOUS; SUBCUTANEOUS at 00:30

## 2022-05-12 RX ADMIN — DEXTROSE AND SODIUM CHLORIDE 125 ML/HR: 5; 450 INJECTION, SOLUTION INTRAVENOUS at 03:03

## 2022-05-12 RX ADMIN — CARVEDILOL 12.5 MG: 12.5 TABLET, FILM COATED ORAL at 08:54

## 2022-05-12 RX ADMIN — LABETALOL HYDROCHLORIDE 10 MG: 5 INJECTION INTRAVENOUS at 07:02

## 2022-05-12 RX ADMIN — AMLODIPINE BESYLATE 5 MG: 5 TABLET ORAL at 08:54

## 2022-05-12 RX ADMIN — TAMSULOSIN HYDROCHLORIDE 0.4 MG: 0.4 CAPSULE ORAL at 08:54

## 2022-05-12 RX ADMIN — ACETAMINOPHEN 325MG 650 MG: 325 TABLET ORAL at 19:46

## 2022-05-12 RX ADMIN — LABETALOL HYDROCHLORIDE 10 MG: 5 INJECTION INTRAVENOUS at 03:03

## 2022-05-12 RX ADMIN — CEFAZOLIN SODIUM 1000 MG: 1 INJECTION, POWDER, FOR SOLUTION INTRAMUSCULAR; INTRAVENOUS at 21:04

## 2022-05-12 RX ADMIN — HEPARIN SODIUM 5000 UNITS: 5000 INJECTION INTRAVENOUS; SUBCUTANEOUS at 16:07

## 2022-05-12 RX ADMIN — SODIUM CHLORIDE, PRESERVATIVE FREE 10 ML: 5 INJECTION INTRAVENOUS at 06:16

## 2022-05-12 RX ADMIN — SODIUM CHLORIDE, PRESERVATIVE FREE 10 ML: 5 INJECTION INTRAVENOUS at 13:58

## 2022-05-12 RX ADMIN — ACETAMINOPHEN 325MG 650 MG: 325 TABLET ORAL at 01:01

## 2022-05-12 RX ADMIN — Medication 1 AMPULE: at 08:54

## 2022-05-12 RX ADMIN — LOPERAMIDE HYDROCHLORIDE 2 MG: 2 CAPSULE ORAL at 16:06

## 2022-05-12 RX ADMIN — Medication 4 UNITS: at 16:06

## 2022-05-12 RX ADMIN — HEPARIN SODIUM 5000 UNITS: 5000 INJECTION INTRAVENOUS; SUBCUTANEOUS at 23:21

## 2022-05-12 RX ADMIN — CARVEDILOL 12.5 MG: 12.5 TABLET, FILM COATED ORAL at 16:06

## 2022-05-12 RX ADMIN — LABETALOL HYDROCHLORIDE 10 MG: 5 INJECTION INTRAVENOUS at 14:02

## 2022-05-12 NOTE — PROGRESS NOTES
Problem: Mobility Impaired (Adult and Pediatric)  Goal: *Acute Goals and Plan of Care (Insert Text)  Description: FUNCTIONAL STATUS PRIOR TO ADMISSION: Patient was independent and active without use of DME.    HOME SUPPORT PRIOR TO ADMISSION: The patient lived with mother. Physical Therapy Goals  Initiated 5/12/2022  1. Patient will move from supine to sit and sit to supine  in bed with independence within 7 day(s). 2.  Patient will transfer from bed to chair and chair to bed with modified independence using the least restrictive device within 7 day(s). 3.  Patient will perform sit to stand with modified independence within 7 day(s). 4.  Patient will ambulate with modified independence for 300 feet with the least restrictive device within 7 day(s). Outcome: Not Met     PHYSICAL THERAPY EVALUATION  Patient: Inetta Shone (86 y.o. male)  Date: 5/12/2022  Primary Diagnosis: DKA (diabetic ketoacidosis) ([facility] [E11.10]        Precautions:   Fall      ASSESSMENT  Based on the objective data described below, the patient presents with generalized weakness and altered gait. Pt was received in supine and cleared by nursing to mobilize. BP came down and remained stable once mobilizing. He was able to come to the edge of the bed without difficulty. Provided brief due to stool incontinence. Provided rollator and ambulated decent distance. No loss of balance noted and no major complaints of pain from pt. He was returned to the room and left sitting up in the chair. Discussed possible use of SPC, trial during next session. Current Level of Function Impacting Discharge (mobility/balance): CGA    Functional Outcome Measure: The patient scored Total: 50/100 on the Barthel Index outcome measure which is indicative of being partially dependent in basic self-care. Other factors to consider for discharge:      Patient will benefit from skilled therapy intervention to address the above noted impairments. PLAN :  Recommendations and Planned Interventions: bed mobility training, transfer training, gait training, therapeutic exercises, patient and family training/education, and therapeutic activities      Frequency/Duration: Patient will be followed by physical therapy:  3 times a week to address goals. Recommendation for discharge: (in order for the patient to meet his/her long term goals)  Physical therapy at least 2 days/week in the home     This discharge recommendation:  Has not yet been discussed the attending provider and/or case management    IF patient discharges home will need the following DME: to be determined (TBD)         SUBJECTIVE:   Patient stated I probably need a cane.     OBJECTIVE DATA SUMMARY:   HISTORY:    Past Medical History:   Diagnosis Date    Bipolar 1 disorder, depressed ([facility]     Bipolar disorder ([facility]     Depression     Diabetes ([facility]     DKA, type 1 ([facility] 1/27/2013    diagnosed age 23    H/O noncompliance with medical treatment, presenting hazards to health     MRSA (methicillin resistant staph aureus) culture positive     MRSA (methicillin resistant Staphylococcus aureus)     Face    Noncompliance with medication regimen     Second hand smoke exposure     Seizure ([facility]     Seizures ([facility] [facility] 2006 or 2007    one episode during jail     Past Surgical History:   Procedure Laterality Date    HX HEENT      top left wisdom tooth    HX ORTHOPAEDIC Left     wrist; MCV    UPPER GI ENDOSCOPY,BIOPSY  11/20/2018            Personal factors and/or comorbidities impacting plan of care:     Home Situation  Home Environment: Trailer/mobile home  Wheelchair Ramp: Yes  One/Two Story Residence: One story  Living Alone: No  Support Systems: Parent(s)  Patient Expects to be Discharged to[de-identified] Home  Current DME Used/Available at Home: Shower chair  Tub or Shower Type: Shower    EXAMINATION/PRESENTATION/DECISION MAKING:   Critical Behavior:  Neurologic State: Alert  Orientation Level: Oriented X4 Hearing: Auditory  Auditory Impairment: None  Skin:  intact  Edema: none  Range Of Motion:  AROM: Generally decreased, functional           PROM: Generally decreased, functional           Strength:    Strength: Generally decreased, functional                    Tone & Sensation:   Tone: Normal              Sensation: Impaired               Coordination:  Coordination: Within functional limits  Vision:      Functional Mobility:  Bed Mobility:  Rolling: Stand-by assistance  Supine to Sit: Stand-by assistance     Scooting: Stand-by assistance  Transfers:  Sit to Stand: Contact guard assistance  Stand to Sit: Contact guard assistance                       Balance:   Sitting: Intact  Standing: Impaired  Standing - Static: Fair  Standing - Dynamic : Fair;Constant support  Ambulation/Gait Training:  Distance (ft): 200 Feet (ft)  Assistive Device: Walker, rollator  Ambulation - Level of Assistance: Contact guard assistance        Gait Abnormalities: Decreased step clearance              Speed/Ana: Slow  Step Length: Left shortened;Right shortened                     Functional Measure:  Barthel Index:    Bathin  Bladder: 0  Bowels: 5  Groomin  Dressin  Feeding: 10  Mobility: 10  Stairs: 0  Toilet Use: 5  Transfer (Bed to Chair and Back): 10  Total: 50/100       The Barthel ADL Index: Guidelines  1. The index should be used as a record of what a patient does, not as a record of what a patient could do. 2. The main aim is to establish degree of independence from any help, physical or verbal, however minor and for whatever reason. 3. The need for supervision renders the patient not independent. 4. A patient's performance should be established using the best available evidence. Asking the patient, friends/relatives and nurses are the usual sources, but direct observation and common sense are also important. However direct testing is not needed.   5. Usually the patient's performance over the preceding 24-48 hours is important, but occasionally longer periods will be relevant. 6. Middle categories imply that the patient supplies over 50 per cent of the effort. 7. Use of aids to be independent is allowed. Score Interpretation (from [address])    Independent   60-79 Minimally independent   40-59 Partially dependent   20-39 Very dependent   <20 Totally dependent     -[employer]., Barthel, D.W. (1965). Functional evaluation: the Barthel Index. [address] ([address] (1997). The Barthel activities of daily living index: self-reporting versus actual performance in the old (> or = 75 years). Journal of [address] [place] [address] 45(7), [facility], FELIXFAWAD, Rachel Adam., [facility]. (1999). Measuring the change in disability after inpatient rehabilitation; comparison of the responsiveness of the Barthel Index and Functional [place] Measure. Journal of Neurology, Neurosurgery, and Psychiatry, 66(4), 540-096. Hernan Maynard, N.J.A, ANDERS Barnes, & Andrae Givens MWillamA. (2004) Assessment of post-stroke quality of life in cost-effectiveness studies: The usefulness of the Barthel Index and the EuroQoL-5D.  Quality of Life Research, 15, 206-72        Physical Therapy Evaluation Charge Determination   History Examination Presentation Decision-Making   HIGH Complexity :3+ comorbidities / personal factors will impact the outcome/ POC  MEDIUM Complexity : 3 Standardized tests and measures addressing body structure, function, activity limitation and / or participation in recreation  MEDIUM Complexity : Evolving with changing characteristics  Other outcome measures barthel  MEDIUM      Based on the above components, the patient evaluation is determined to be of the following complexity level: MEDIUM    Pain Rating:  No major complaints     Activity Tolerance:   Fair    After treatment patient left in no apparent distress:   Sitting in chair and Call bell within reach    COMMUNICATION/EDUCATION:   The patients plan of care was discussed with: Occupational therapist and Registered nurse. Fall prevention education was provided and the patient/caregiver indicated understanding. and Patient/family agree to work toward stated goals and plan of care.     Thank you for this referral.  Mitesh Duarte, PT, DPT   Time Calculation: 26 mins

## 2022-05-12 NOTE — DIABETES MGMT
[address] [facility]    CLINICAL NURSE SPECIALIST CONSULT     Initial Presentation   Lindy Montaño is a 46 y.o. male admitted from the ER with altered mental status. Found to have >999 cc in bladder => Beard placed. LAB: BG 1335 with incalculable AG. Urinary glucose & ketone +. WBC 16.8. Hyponatremic. K 7.3. FELECIA. CXR: No acute process noted  CT ABd: Markedly distended urinary bladder and moderately severe right-sided hydronephrosis despite the presence of a Beard catheter; correlate with Beard catheter function. Air throughout the urinary bladder wall is suspicious for emphysematous cystitis given the clinical finding of sepsis. HX:   Past Medical History:   Diagnosis Date    Bipolar 1 disorder, depressed ([address]     Bipolar disorder ([address]     Depression     Diabetes ([address]     DKA, type 1 ([address] 1/27/2013    diagnosed age 23    H/O noncompliance with medical treatment, presenting hazards to health     MRSA (methicillin resistant staph aureus) culture positive     MRSA (methicillin resistant Staphylococcus aureus)     Face    Noncompliance with medication regimen     Second hand smoke exposure     Seizure ([address]     Seizures ([address] 2006 or 2007    one episode during [facility]      INITIAL DX:   DKA (diabetic ketoacidosis) ([address] [E11.10]     Current Treatment     TX: Insulin. IVF. Heparin. ABx. BP management    Consulted by Provider for advanced diabetes nursing assessment and care for:   [x] Transitioning off [place]   [x] Inpatient management strategy  [x] Home management assessment  [] Survival skill education    Hospital Course   Clinical progress has been complicated by need for ICU level of care. 5/9/22  retroperitoneum: The right kidney measures 13.6 cm in sagittal length. The left kidney measures 13.8 cm in sagittal length. There is no hydronephrosis. No renal cyst, calculus or mass is visualized.  The abdominal aorta and aortic bifurcation are not well visualized due to overlying bowel gas. The visualized IVC is normal. The urinary bladder is incompletely distended and a Beard catheter is present. 5/10/22 Alert & oriented today. NPO. Beard+. Frequent stools. 5/11/22 Alert & oriented. Ate dinner last evening and did not have mealtime insulin ordered. BG marco to 400s. No AG. Henrietta Pandya restarted this morning. 5/12/22 Alert & oriented. Patient states he ate lunch and dinner meals yesterday. Remained on Glucostabilizer. BGs in 160-200s. AG remains closed. Diabetes History   Per EMR, patient has Type 1 diabetes since age 23, and positive family history in mother and niece. Admission BG 1335. A1c 11.4% (3/8/22). Diabetes care per PCP, but has appointment to see endocrinologist in June. Diabetes-related Medical History  Acute complications  DKA  Other associated conditions     Depression    Diabetes Medication History  Key Antihyperglycemic Medications             insulin aspart U-100 (NovoLOG Flexpen U-100 Insulin) 100 unit/mL (3 mL) inpn (Taking) 2 Units by SubCUTAneous route Before breakfast, lunch, and dinner. Lantus insulin 16 units D     Diabetes self-management practices: Patient reports taking standard doses of basal and mealtime insulin. He is using a [employer] CGM system and corrects when he sees high blood glucoses; he gives as much as 20 units to correct. He is injecting into his belly and has evidence of overuse in the areas adjacent to his umbilicus. Patient eats a lunch & evening meal. Drinks coffee and unsweetened tea. Subjective   \"Yes, I'm eating. \"     Objective   Physical exam  General Normal weight male who is in no acute distress  Neuro  Alert & oriented  Vital Signs   Visit Vitals  BP (!) 168/101   Pulse 82   Temp 98 °F (36.7 °C)   Resp 18   Ht 5' 9.02\" (1.753 m)   Wt 70.8 kg (156 lb 1.4 oz)   SpO2 94%   BMI 23.04 kg/m²     Laboratory  Recent Labs     05/12/22  0451 05/11/22  1530 05/11/22  1134 05/11/22  0521 05/11/22  0436 05/10/22  1032 05/10/22  0627 05/10/22  0010 05/10/22  0010   * 125* 203*   < >  --    < > 264*   < > 197*   AGAP 7 6 9   < >  --    < > 14   < > 12   WBC  --   --   --   --  11.4*  --  17.7*  --  17.4*   CREA 2.34* 2.53* 2.50*   < >  --    < > 2.95*   < > 3.03*   GFRNA 31* 29* 29*   < >  --    < > 24*   < > 23*    < > = values in this interval not displayed. Factors impacting BG management  Factor Dose Comments   Nutrition:  Standard meals   Carb controlled 45 gms/meal   Ate lunch & dinner yesterday   Infection Ancef (refusing) Afebrile. WBC elevated  Blood cultures negative. Urine with gram negative rods   Other:   Kidney function  Liver function   FELECIA  Liver enzymes normal      Blood glucose pattern      Significant diabetes-related events over the past 24-72 hours  Admitted in DKA with BG 1335 and incalculable AG. Received fluid resuscitation & insulin  BG <250mg/dl @10am 5/9/22 => D5/.45NaCl started @100cc/hr  AG closed 5/9/22 X2 @530pm 5/9/22 => not transitioned off [address] has reopened 630am 5/10/22 => switched to D10 @ 50cc/hr @8am. Transitioned off GS @5pm. Given food without mealtime coverage. BGs marco to 300-400s. Placed back on GS @9am 5/11/22  BG in 160-200 range & AG remains closed    Assessment and Plan   Nursing Diagnosis Risk for unstable blood glucose pattern   Nursing Intervention Domain 3210 Decision-making Support   Nursing Interventions Examined current inpatient diabetes/blood glucose control   Explored factors facilitating and impeding inpatient management  Explored corrective strategies with patient and responsible inpatient provider   Informed patient of rational for insulin strategy while hospitalized     Evaluation   This normal weight  male with known Type 1 diabetes was admitted in DKA, as evidenced by BG 1335 & incalculable AG. In our conversation today, he reports taking his insulins regularly.  He has been using a [employer] CGM system to detect BG patterns and address accordingly. He was surprised by this event and the need for hospitalization. Blood glucoses came down 5/9/22 with a significant amount of insulin (578.9 ml). Patient's AG reopened and IVFs were switched to D10 5/10/22 morning and the multiplier changed. Insulin ran at about 13 units/hr until the [address] closed X2 @230pm 5/10/22. Patient was transitioned off insulin infusion. Since the patient has Type 1 diabetes, it was recommended that both basal insulin and mealtime insulin be started. Mealtime insulin was not ordered. BG marco resulting in the restarting of GS. As this patient had Type 1 diabetes, it is important that this gentleman receive both basal and bolus insulins upon transitioning off GS. Recommendations remain the same. Recommendations     Use of Subcutaneous Insulin Order set (6525)  Insulin Dosing Specific recommendation   Basal                                      (Based on weight, BMI & GFR) [x]        0.2 units/kg/D  [] 0.3 units/kg/D  [] 0.4 units/kg/D Transition off glucostabilizer with 16 units of Lantus insulin   Nutritional                                      (Based on CHO/dextrose load) [x] Normal sensitivity  [] Insulin-resistant sensitivity Humalog insulin 4 units with consumed meals   Corrective                                       (Useful in adjusting insulin dosing) [] Normal sensitivity  [x] HIGH sensitivity  [] Insulin-resistant sensitivity      Billing Code(s)   [x] 25 minutes    Before making these care recommendations, I personally reviewed the hospitalization record, including notes, laboratory & diagnostic data and current medications, and examined the patient at the bedside (circumstances permitting) before making care recommendations. More than fifty (50) percent of the time was spent in patient counseling and/or care coordination.   Total minutes: [address] [facility] [place], CNS  Diabetes Clinical Nurse Specialist  [facility]  Access via [address] [place] [address]

## 2022-05-12 NOTE — PROGRESS NOTES
[facility]    ICU TEAM Progress Note    Name: Katy Narvaez   : 1980   MRN: [id number]   Date: 2022           ICU Assessment & Plan of Emely Ray Is a 46 y.o. male with bipolar disorder, HTN, DM1 admitted  with DKA. NEURO  #. Pain, agitation, delirium  - tylenol prn    CARDIAC  No acute    RESPIRATORY  No acute    RENAL  #. FELECIA  #. Emphysematous cystitis, R hydro  - Urology consulted; greatly appreciate assistance  - Beard to stay until outpatient appt  - e- repletion prn    GASTROINTESTINAL  #. At risk for malnutrition  - resume diet  - Nutrition following    HEMATOLOGIC  #. Anemia, likely AoCD  - no indication for transfusion at this time; monitor    ID  #. Klebs pna UTI  - cefazolin x7d    ENDOCRINE  #. T1DM with DKA  - DKA protocol, currently on insulin gtt   - gap reopened this AM after gap closed x2   - given albumin, normal AG for this patient is closer to 5  - transition again today as pt tolerating diet  - greatly appreciate Diabetes Mgt assistance      DVT Prophylaxis: SCDs, heparin  U - Ulcer Prophylaxis: not indicated  G - Glycemic Control: Insulin  B - Bowel Regimen: miralax prn  Tubes: None  Lines: Peripheral IV  Drains: None    Subjective:   Progress Note: 2022      Reason for ICU Admission: DKA     HPI: (per prior clinicians) 47 y/o with pmh of bipolar disorder, HTN, DM1 presents to [facility] ICU for AMS. Upon arrival HDS, lethargic and disoriented, opens eyes to voice and follows commands. Lab work done and significant for pH 6.95 with a bicarb of 3. Glucose 1335,  (corrected 136), K 7.3, Cr 3.76, WBC 16.8. Initially temp unable to be obtained, core temp taken and showed a temp of 88.9 deg F.  Placed on warming blanket. Given 2 L IVF, calcium, and started on an insulin gtt. ICU consulted for admission.     Upon my arrival pt still HDS and lethargic and disoriented.   Physical exam significant for lower abdomen distention, rigidity and tenderness. Bladder scan done and showed >999. Beard placed. Will get CT abd to r/o intra abdominal source of infection. Pt will be admitted to the ICU for further management.     Called back to bedside bc pt agitated and pulled out one of his IVs,unable to to get IV access. Also Map 62. Upon my assessment pt agitated, somewhat redirectable. I placed a ultrasound guided 20 karissa IV in the rt basilic vein and severo all labs. I accompanied pt and nurse to CT (to r/o intra abdominal source of infection) since BP borderline. Pt then transferred to ICU without complication. Overnight Events:   5/9: Gap decreasing. 5/10: gap closed yesterday evening but left on gtt to transition in AM. Gap reopened this AM.    5/11: attempted to transition yesterday evening after two closed gaps, but reopened again this morning. Back on insulin gtt. Diabetes Mgt following.  5/12: pt now tolerating diet better. Gap closer to closed (accounting for albumin) last night, but stayed on insulin gtt given high likelihood of reopening overnight again. Plan for transition today. Home Medications:     Prior to Admission medications    Medication Sig Start Date End Date Taking? Authorizing Provider   tamsulosin (FLOMAX) 0.4 mg capsule Take 0.4 mg by mouth daily. Yes Provider, Historical   insulin aspart U-100 (NovoLOG Flexpen U-100 Insulin) 100 unit/mL (3 mL) inpn 2 Units by SubCUTAneous route Before breakfast, lunch, and dinner. 3/12/22  Yes Sunny Ramos MD   ondansetron (ZOFRAN ODT) 8 mg disintegrating tablet Take 1 Tablet by mouth every eight (8) hours as needed for Nausea or Vomiting. 3/3/22  Yes [employer], DO   pen needle, diabetic 30 gauge x 3/16\" ndle 5 Units by Does Not Apply route Before breakfast, lunch, and dinner. 2/23/22  Yes Bill Colunga MD   amLODIPine (NORVASC) 5 mg tablet Take 1 Tablet by mouth daily.  2/15/22  Yes Joelle Goldstein MD   carvediloL (COREG) 12.5 mg tablet Take 1 Tablet by mouth two (2) times daily (with meals). 2/14/22  Yes Gladis Pena MD   finasteride (PROSCAR) 5 mg tablet Take 1 Tablet by mouth daily. 2/15/22  Yes Gladis Pena MD   rosuvastatin (CRESTOR) 40 mg tablet Take 1 Tablet by mouth nightly. 1/27/22  Yes Ted Phan MD   ondansetron (ZOFRAN ODT) 4 mg disintegrating tablet Take 1 Tablet by mouth every eight (8) hours as needed for Nausea or Vomiting. 12/28/21  Yes Yahir Garcia MD   DULoxetine (CYMBALTA) 60 mg capsule Take 1 Capsule by mouth daily. 12/13/21  Yes Ted Phan MD   pantoprazole (Protonix) 40 mg tablet Take 1 Tablet by mouth daily. 12/6/21  Yes José Luis Yun MD   pregabalin (Lyrica) 75 mg capsule Take 75 mg by mouth two (2) times a day. Yes Provider, Historical   naloxone (Narcan) 4 mg/actuation nasal spray Use 1 spray intranasally, then discard. Repeat with new spray every 2 min as needed for opioid overdose symptoms, alternating nostrils.  2/23/22   Ted Phan MD   ergocalciferol (ERGOCALCIFEROL) 1,250 mcg (50,000 unit) capsule Take 1 capsule by mouth once a week  Patient not taking: Reported on 5/10/2022 10/30/21   Ted Phan MD       Current Meds:     Current Facility-Administered Medications   Medication Dose Route Frequency    dextrose 5 % - 0.45% NaCl infusion  125 mL/hr IntraVENous CONTINUOUS    labetaloL (NORMODYNE;TRANDATE) injection 10 mg  10 mg IntraVENous Q4H PRN    calcium carbonate (TUMS) chewable tablet 200 mg [elemental]  200 mg Oral PRN    prochlorperazine (COMPAZINE) with saline injection 5 mg  5 mg IntraVENous Q4H PRN    insulin regular (NOVOLIN R, HUMULIN R) 100 Units in 0.9% sodium chloride 100 mL infusion  0-50 Units/hr IntraVENous TITRATE    [Held by provider] insulin lispro (HUMALOG) injection   SubCUTAneous TIDAC    glucose chewable tablet 16 g  4 Tablet Oral PRN    dextrose 10 % infusion 0-250 mL  0-250 mL IntraVENous PRN    glucagon (GLUCAGEN) injection 1 mg  1 mg IntraMUSCular PRN    alcohol 62% (NOZIN) nasal  1 Ampule  1 Ampule Topical Q12H    ceFAZolin (ANCEF) 1,000 mg in sterile water (preservative free) injection  1,000 mg IntraVENous Q8H    glucose chewable tablet 16 g  4 Tablet Oral PRN    glucagon (GLUCAGEN) injection 1 mg  1 mg IntraMUSCular PRN    dextrose 10% infusion 0-250 mL  0-250 mL IntraVENous PRN    amLODIPine (NORVASC) tablet 5 mg  5 mg Oral DAILY    carvediloL (COREG) tablet 12.5 mg  12.5 mg Oral BID WITH MEALS    tamsulosin (FLOMAX) capsule 0.4 mg  0.4 mg Oral DAILY    heparin (porcine) injection 5,000 Units  5,000 Units SubCUTAneous Q8H    sodium chloride (NS) flush 5-40 mL  5-40 mL IntraVENous Q8H    sodium chloride (NS) flush 5-40 mL  5-40 mL IntraVENous PRN    acetaminophen (TYLENOL) tablet 650 mg  650 mg Oral Q6H PRN    Or    acetaminophen (TYLENOL) suppository 650 mg  650 mg Rectal Q6H PRN    polyethylene glycol (MIRALAX) packet 17 g  17 g Oral DAILY PRN    ondansetron (ZOFRAN ODT) tablet 4 mg  4 mg Oral Q8H PRN    Or    ondansetron (ZOFRAN) injection 4 mg  4 mg IntraVENous Q6H PRN       Objective:   Vital Signs:  Visit Vitals  BP (!) 177/101   Pulse 80   Temp 98 °F (36.7 °C)   Resp 14   Ht 5' 9.02\" (1.753 m)   Wt 70.8 kg (156 lb 1.4 oz)   SpO2 93%   BMI 23.04 kg/m²      O2 Device: None (Room air) Temp (24hrs), Av.2 °F (36.8 °C), Min:98 °F (36.7 °C), Max:98.4 °F (36.9 °C)           Intake/Output:     Intake/Output Summary (Last 24 hours) at 2022 0853  Last data filed at 2022 0600  Gross per 24 hour   Intake 3603.82 ml   Output 3475 ml   Net 128.82 ml       Physical Exam:   Sleeping, awakens to voice, appropriate  Resps even and unlabored, symmetric chest rise on RA  Reg rate, no heave/rub  Peripheral pulses intact  Abd soft, nontender  Skin warm, dry; multiple tattoos throughout  LAKHANI      LABS AND  DATA: Personally reviewed  Recent Labs     22  0436 05/10/22  0627   WBC 11.4* 17.7*   HGB 9.4* 9.1*   HCT 28.7* 27.7*   * 475*     Recent Labs     05/12/22  0451 05/11/22  1530 05/11/22  0521 05/11/22  0436 05/10/22  0627 05/10/22  0010   * 136   < >  --    < > 135*   K 3.3* 3.9   < >  --    < > 3.8    110*   < >  --    < > 108   CO2 19* 20*   < >  --    < > 15*   BUN 32* 40*   < >  --    < > 59*   CREA 2.34* 2.53*   < >  --    < > 3.03*   * 125*   < >  --    < > 197*   CA 8.2* 8.0*   < >  --    < > 7.5*   MG 2.1 2.1   < >  --    < > 2.1   PHOS  --   --   --  4.0  --  4.0    < > = values in this interval not displayed. No results for input(s): AP, TBIL, TP, ALB, GLOB, AML, LPSE in the last 72 hours. No lab exists for component: SGOT, GPT, AMYP  No results for input(s): INR, PTP, APTT, INREXT, INREXT in the last 72 hours. No results for input(s): PHI, PCO2I, PO2I, FIO2I in the last 72 hours. No results for input(s): CPK, CKMB, TROIQ, BNPP in the last 72 hours. MEDS: Reviewed    Chest X-Ray:  CXR Results  (Last 48 hours)    None        Multidisciplinary Rounds Completed:  Pending    ABCDEF Bundle/Checklist Completed:  Yes    SPECIAL EQUIPMENT  None    DISPOSITION  Stay in ICU    CRITICAL CARE CONSULTANT NOTE  I had a face to face encounter with the patient, reviewed and interpreted patient data including clinical events, labs, images, vital signs, I/O's, and examined patient. I have discussed the case and the plan and management of the patient's care with the consulting services, the bedside nurses and the respiratory therapist.      NOTE OF PERSONAL INVOLVEMENT IN CARE   This patient has a high probability of imminent, clinically significant deterioration, which requires the highest level of preparedness to intervene urgently. I participated in the decision-making and personally managed or directed the management of the following life and organ supporting interventions that required my frequent assessment to treat or prevent imminent deterioration.     I personally spent 35 minutes of critical care time. This is time spent at this critically ill patient's bedside actively involved in patient care as well as the coordination of care. This does not include any procedural time which has been billed separately.     Rosemary Swenson MD  Intensivist  5/12/2022

## 2022-05-12 NOTE — PROGRESS NOTES
Problem: Self Care Deficits Care Plan (Adult)  Goal: *Acute Goals and Plan of Care (Insert Text)  Description: FUNCTIONAL STATUS PRIOR TO ADMISSION: Patient was independent and active without use of DME. Patient was modified independent for basic and instrumental ADLs. Patient reported his mom assisted with ADLs on \"bad days\". HOME SUPPORT: The patient lived with mom. Occupational Therapy Goals  Initiated 5/12/2022  1. Patient will perform grooming standing at sink with independence within 7 day(s). 2.  Patient will perform upper body dressing with independence within 7 day(s). 3.  Patient will perform lower body dressing with independence within 7 day(s). 4.  Patient will perform toilet transfers with independence within 7 day(s). 5.  Patient will perform all aspects of toileting with independence within 7 day(s). Outcome: Not Met   OCCUPATIONAL THERAPY EVALUATION  Patient: Joselyn Walker (89 y.o. male)  Date: 5/12/2022  Primary Diagnosis: [facility]di[facility] [E11.10]        Precautions: Fall    ASSESSMENT  Based on the objective data described below, the patient presents with decreased independence in self-care and functional mobility secondary to general weakness, impaired balance, and decreased activity tolerance. Patient is functioning below his baseline for self-care and functional mobility, now completing self-care with independence to max assist and functional mobility with stand-by to contact guard assist using rollator. Patient received semisupine in bed after being cleaned for bowel movement and cleared for therapy by nursing. Patient completed supine > sit with stand-by assist and demonstrated intact sitting balance. Patient attempted to don brief and required min assist to thread over RLE. Patient completed sit > stand with contact guard assist and walked to sink in room with HHA.  Patient completed grooming in standing with stand-by assist and required assist to open containers. Patient walked in hallway with PT present using rollator and tolerated well. Patient returned to room and agreed to end session sitting in chair. Patient was left sitting in chair with all needs met, VSS, and chair alarmed. Patient would benefit from skilled OT services during acute hospital stay. Anticipate patient can return home with HHOT/PT and assist from family, pending progress. Current Level of Function Impacting Discharge (ADLs/self-care): independence to max assist for self-care, stand-by to contact guard assist for functional mobility     Functional Outcome Measure: The patient scored 50/100 on the Barthel Index outcome measure which is indicative of being partially dependent in ADLs. Other factors to consider for discharge: fall risk, visual impairment     Patient will benefit from skilled therapy intervention to address the above noted impairments. PLAN :  Recommendations and Planned Interventions: self care training, functional mobility training, therapeutic exercise, balance training, therapeutic activities, endurance activities, patient education, home safety training and family training/education    Frequency/Duration: Patient will be followed by occupational therapy 3 times a week to address goals.     Recommendation for discharge: (in order for the patient to meet his/her long term goals)  Occupational therapy at least 2 days/week in the home     This discharge recommendation:  Has not yet been discussed the attending provider and/or case management    IF patient discharges home will need the following DME: TBD pending progress       SUBJECTIVE:   Patient stated My mom helps me when I need her to.    OBJECTIVE DATA SUMMARY:   HISTORY:   Past Medical History:   Diagnosis Date    [facility]     [facility]     Depression     Diabetes ([facility]     DKA, type 1 ([facility] 1/27/2013    diagnosed age 23    H/O noncompliance with medical treatment, presenting hazards to health     MRSA (methicillin resistant staph aureus) culture positive     MRSA (methicillin resistant Staphylococcus aureus)     Face    Noncompliance with medication regimen     Second hand smoke exposure     Seizure [facility]     Seizures ([facility] 2006 or 2007    one episode during care home     Past Surgical History:   Procedure Laterality Date    HX HEENT      top left wisdom tooth    HX ORTHOPAEDIC Left     wrist; MCV    UPPER GI ENDOSCOPY,BIOPSY  11/20/2018            Expanded or extensive additional review of patient history:     Home Situation  Home Environment: Trailer/mobile home  Wheelchair Ramp: Yes  One/Two Story Residence: One story  Living Alone: No  Support Systems: Parent(s)  Patient Expects to be Discharged to[de-identified] Home  Current DME Used/Available at Home: Shower chair  Tub or Shower Type: Shower    Hand dominance: Right    EXAMINATION OF PERFORMANCE DEFICITS:  Cognitive/Behavioral Status:  Neurologic State: Alert  Orientation Level: Oriented X4  Cognition: Follows commands  Perception: Appears intact  Perseveration: No perseveration noted  Safety/Judgement: Awareness of environment;Decreased insight into deficits    Hearing: Auditory  Auditory Impairment: None    Vision/Perceptual:    Acuity: Impaired near vision; Impaired far vision (impaired vision in L eye (baseline))    Corrective Lenses: Glasses (not with him)    Range of Motion:  AROM: Generally decreased, functional  PROM: Generally decreased, functional    Strength:  Strength: Generally decreased, functional    Coordination:  Coordination: Within functional limits  Fine Motor Skills-Upper: Left Intact; Right Intact    Gross Motor Skills-Upper: Left Intact; Right Intact    Tone & Sensation:  Tone: Normal  Sensation: Impaired    Balance:  Sitting: Intact  Standing: Impaired  Standing - Static: Fair  Standing - Dynamic : Fair;Constant support    Functional Mobility and Transfers for ADLs:  Bed Mobility:  Rolling: Stand-by assistance  Supine to Sit: Stand-by assistance  Scooting: Stand-by assistance    Transfers:  Sit to Stand: Contact guard assistance  Stand to Sit: Contact guard assistance  Bed to Chair: Contact guard assistance    ADL Assessment:  Feeding: Independent  Oral Facial Hygiene/Grooming: Setup;Stand-by assistance  Bathing: Minimum assistance  Upper Body Dressing: Setup;Supervision  Lower Body Dressing: Minimum assistance  Toileting: Maximum assistance (hansen catheter )    ADL Intervention and task modifications:    Grooming  Position Performed: Standing (at sink)  Brushing Teeth: Set-up; Stand-by assistance  Cues: Verbal cues provided    Lower Body Dressing Assistance  Protective Undergarmet: Minimum assistance  Leg Crossed Method Used: No  Position Performed: Bending forward method;Seated edge of bed  Cues: Don;Physical assistance; Tactile cues provided;Verbal cues provided    Toileting  Bladder Hygiene: Total assistance (dependent) (hansen catheter)    Cognitive Retraining  Safety/Judgement: Awareness of environment;Decreased insight into deficits    Functional Measure:    Barthel Index:  Bathin  Bladder: 0  Bowels: 5  Groomin  Dressin  Feeding: 10  Mobility: 10  Stairs: 0  Toilet Use: 5  Transfer (Bed to Chair and Back): 10  Total: 50/100      The Barthel ADL Index: Guidelines  1. The index should be used as a record of what a patient does, not as a record of what a patient could do. 2. The main aim is to establish degree of independence from any help, physical or verbal, however minor and for whatever reason. 3. The need for supervision renders the patient not independent. 4. A patient's performance should be established using the best available evidence. Asking the patient, friends/relatives and nurses are the usual sources, but direct observation and common sense are also important. However direct testing is not needed.   5. Usually the patient's performance over the preceding 24-48 hours is important, but occasionally longer periods will be relevant. 6. Middle categories imply that the patient supplies over 50 per cent of the effort. 7. Use of aids to be independent is allowed. Score Interpretation (from [address])    Independent   60-79 Minimally independent   40-59 Partially dependent   20-39 Very dependent   <20 Totally dependent     -Beltran Solis., Barthel, D.W. (1965). Functional evaluation: the Barthel Index. [address] ([address] (1997). The Barthel activities of daily living index: self-reporting versus actual performance in the old (> or = 75 years). Journal of [address] [place] [address] 45(7), [facility], LEFTY, Bladimir Zelaya., Batsheva Antunez. (1999). Measuring the change in disability after inpatient rehabilitation; comparison of the responsiveness of the Barthel Index and Functional Bond Measure. Journal of Neurology, Neurosurgery, and Psychiatry, 66(4), 483-346. Adilson Romo, NWillamJ.A, ANDERS Barnes, & Karen Rascon MJETT. (2004) Assessment of post-stroke quality of life in cost-effectiveness studies: The usefulness of the Barthel Index and the EuroQoL-5D. Quality of Life Research, 13, 843-65      Based on the above components, the patient evaluation is determined to be of the following complexity level: MEDIUM  Pain Rating:  Patient c/o stomach pain. RN aware and following. Activity Tolerance:   Fair, SpO2 stable on RA, and requires rest breaks    After treatment patient left in no apparent distress:    Sitting in chair, Call bell within reach, and Bed / chair alarm activated    COMMUNICATION/EDUCATION:   The patients plan of care was discussed with: Physical therapist and Registered nurse. Home safety education was provided and the patient/caregiver indicated understanding., Patient/family have participated as able in goal setting and plan of care. , and Patient/family agree to work toward stated goals and plan of care. This patients plan of care is appropriate for delegation to GURVINDER.     Thank you for this referral.  Suki Tavares, OTR/L  Time Calculation: 27 mins

## 2022-05-13 LAB
ANION GAP SERPL CALC-SCNC: 9 MMOL/L (ref 5–15)
BUN SERPL-MCNC: 32 MG/DL (ref 6–20)
BUN/CREAT SERPL: 15 (ref 12–20)
CALCIUM SERPL-MCNC: 7.9 MG/DL (ref 8.5–10.1)
CHLORIDE SERPL-SCNC: 108 MMOL/L (ref 97–108)
CO2 SERPL-SCNC: 19 MMOL/L (ref 21–32)
CREAT SERPL-MCNC: 2.12 MG/DL (ref 0.7–1.3)
ERYTHROCYTE [DISTWIDTH] IN BLOOD BY AUTOMATED COUNT: 13.2 % (ref 11.5–14.5)
GLUCOSE BLD STRIP.AUTO-MCNC: 101 MG/DL (ref 65–117)
GLUCOSE BLD STRIP.AUTO-MCNC: 48 MG/DL (ref 65–117)
GLUCOSE BLD STRIP.AUTO-MCNC: 52 MG/DL (ref 65–117)
GLUCOSE BLD STRIP.AUTO-MCNC: 60 MG/DL (ref 65–117)
GLUCOSE BLD STRIP.AUTO-MCNC: 63 MG/DL (ref 65–117)
GLUCOSE BLD STRIP.AUTO-MCNC: 74 MG/DL (ref 65–117)
GLUCOSE BLD STRIP.AUTO-MCNC: 80 MG/DL (ref 65–117)
GLUCOSE BLD STRIP.AUTO-MCNC: 84 MG/DL (ref 65–117)
GLUCOSE SERPL-MCNC: 78 MG/DL (ref 65–100)
HCT VFR BLD AUTO: 25.8 % (ref 36.6–50.3)
HGB BLD-MCNC: 9.1 G/DL (ref 12.1–17)
MAGNESIUM SERPL-MCNC: 2.1 MG/DL (ref 1.6–2.4)
MCH RBC QN AUTO: 29.8 PG (ref 26–34)
MCHC RBC AUTO-ENTMCNC: 35.3 G/DL (ref 30–36.5)
MCV RBC AUTO: 84.6 FL (ref 80–99)
NRBC # BLD: 0 K/UL (ref 0–0.01)
NRBC BLD-RTO: 0 PER 100 WBC
PHOSPHATE SERPL-MCNC: 3.8 MG/DL (ref 2.6–4.7)
PLATELET # BLD AUTO: 365 K/UL (ref 150–400)
PMV BLD AUTO: 9.6 FL (ref 8.9–12.9)
POTASSIUM SERPL-SCNC: 3.2 MMOL/L (ref 3.5–5.1)
RBC # BLD AUTO: 3.05 M/UL (ref 4.1–5.7)
SERVICE CMNT-IMP: ABNORMAL
SERVICE CMNT-IMP: NORMAL
SODIUM SERPL-SCNC: 136 MMOL/L (ref 136–145)
WBC # BLD AUTO: 7.5 K/UL (ref 4.1–11.1)

## 2022-05-13 PROCEDURE — 80048 BASIC METABOLIC PNL TOTAL CA: CPT

## 2022-05-13 PROCEDURE — 83735 ASSAY OF MAGNESIUM: CPT

## 2022-05-13 PROCEDURE — 84100 ASSAY OF PHOSPHORUS: CPT

## 2022-05-13 PROCEDURE — 74011000250 HC RX REV CODE- 250: Performed by: NURSE PRACTITIONER

## 2022-05-13 PROCEDURE — 65270000029 HC RM PRIVATE

## 2022-05-13 PROCEDURE — 74011000250 HC RX REV CODE- 250: Performed by: INTERNAL MEDICINE

## 2022-05-13 PROCEDURE — 74011250636 HC RX REV CODE- 250/636: Performed by: NURSE PRACTITIONER

## 2022-05-13 PROCEDURE — 82962 GLUCOSE BLOOD TEST: CPT

## 2022-05-13 PROCEDURE — 74011250637 HC RX REV CODE- 250/637: Performed by: HOSPITALIST

## 2022-05-13 PROCEDURE — 36415 COLL VENOUS BLD VENIPUNCTURE: CPT

## 2022-05-13 PROCEDURE — 74011250637 HC RX REV CODE- 250/637: Performed by: NURSE PRACTITIONER

## 2022-05-13 PROCEDURE — 74011250637 HC RX REV CODE- 250/637: Performed by: INTERNAL MEDICINE

## 2022-05-13 PROCEDURE — 85027 COMPLETE CBC AUTOMATED: CPT

## 2022-05-13 PROCEDURE — 74011250636 HC RX REV CODE- 250/636: Performed by: INTERNAL MEDICINE

## 2022-05-13 PROCEDURE — 74011636637 HC RX REV CODE- 636/637: Performed by: CLINICAL NURSE SPECIALIST

## 2022-05-13 PROCEDURE — 99233 SBSQ HOSP IP/OBS HIGH 50: CPT | Performed by: CLINICAL NURSE SPECIALIST

## 2022-05-13 RX ORDER — ROSUVASTATIN CALCIUM 40 MG/1
40 TABLET, COATED ORAL
Status: DISCONTINUED | OUTPATIENT
Start: 2022-05-13 | End: 2022-05-14 | Stop reason: HOSPADM

## 2022-05-13 RX ORDER — POTASSIUM CHLORIDE 7.45 MG/ML
10 INJECTION INTRAVENOUS
Status: DISCONTINUED | OUTPATIENT
Start: 2022-05-13 | End: 2022-05-13

## 2022-05-13 RX ORDER — POTASSIUM CHLORIDE 750 MG/1
40 TABLET, FILM COATED, EXTENDED RELEASE ORAL
Status: COMPLETED | OUTPATIENT
Start: 2022-05-13 | End: 2022-05-13

## 2022-05-13 RX ORDER — FINASTERIDE 5 MG/1
5 TABLET, FILM COATED ORAL DAILY
Status: DISCONTINUED | OUTPATIENT
Start: 2022-05-13 | End: 2022-05-14 | Stop reason: HOSPADM

## 2022-05-13 RX ORDER — DULOXETIN HYDROCHLORIDE 30 MG/1
60 CAPSULE, DELAYED RELEASE ORAL DAILY
Status: DISCONTINUED | OUTPATIENT
Start: 2022-05-13 | End: 2022-05-14 | Stop reason: HOSPADM

## 2022-05-13 RX ORDER — POTASSIUM CHLORIDE 750 MG/1
20 TABLET, FILM COATED, EXTENDED RELEASE ORAL ONCE
Status: COMPLETED | OUTPATIENT
Start: 2022-05-13 | End: 2022-05-13

## 2022-05-13 RX ADMIN — HEPARIN SODIUM 5000 UNITS: 5000 INJECTION INTRAVENOUS; SUBCUTANEOUS at 16:39

## 2022-05-13 RX ADMIN — CARVEDILOL 12.5 MG: 12.5 TABLET, FILM COATED ORAL at 16:39

## 2022-05-13 RX ADMIN — POTASSIUM CHLORIDE 20 MEQ: 750 TABLET, EXTENDED RELEASE ORAL at 05:22

## 2022-05-13 RX ADMIN — CEFAZOLIN SODIUM 1000 MG: 1 INJECTION, POWDER, FOR SOLUTION INTRAMUSCULAR; INTRAVENOUS at 05:23

## 2022-05-13 RX ADMIN — SODIUM CHLORIDE, PRESERVATIVE FREE 10 ML: 5 INJECTION INTRAVENOUS at 16:39

## 2022-05-13 RX ADMIN — FINASTERIDE 5 MG: 5 TABLET, FILM COATED ORAL at 09:53

## 2022-05-13 RX ADMIN — LOPERAMIDE HYDROCHLORIDE 2 MG: 2 CAPSULE ORAL at 23:10

## 2022-05-13 RX ADMIN — LOPERAMIDE HYDROCHLORIDE 2 MG: 2 CAPSULE ORAL at 13:40

## 2022-05-13 RX ADMIN — CEFAZOLIN SODIUM 1000 MG: 1 INJECTION, POWDER, FOR SOLUTION INTRAMUSCULAR; INTRAVENOUS at 13:40

## 2022-05-13 RX ADMIN — AMLODIPINE BESYLATE 5 MG: 5 TABLET ORAL at 08:19

## 2022-05-13 RX ADMIN — Medication 4 UNITS: at 12:54

## 2022-05-13 RX ADMIN — ROSUVASTATIN CALCIUM 40 MG: 40 TABLET, FILM COATED ORAL at 23:10

## 2022-05-13 RX ADMIN — POTASSIUM CHLORIDE 40 MEQ: 750 TABLET, EXTENDED RELEASE ORAL at 05:22

## 2022-05-13 RX ADMIN — SODIUM CHLORIDE, PRESERVATIVE FREE 10 ML: 5 INJECTION INTRAVENOUS at 05:23

## 2022-05-13 RX ADMIN — CEFAZOLIN SODIUM 1000 MG: 1 INJECTION, POWDER, FOR SOLUTION INTRAMUSCULAR; INTRAVENOUS at 23:10

## 2022-05-13 RX ADMIN — Medication 4 UNITS: at 09:53

## 2022-05-13 RX ADMIN — Medication 1 CAPSULE: at 10:00

## 2022-05-13 RX ADMIN — PREGABALIN 75 MG: 50 CAPSULE ORAL at 18:06

## 2022-05-13 RX ADMIN — Medication 1 AMPULE: at 08:10

## 2022-05-13 RX ADMIN — HYDRALAZINE HYDROCHLORIDE 10 MG: 20 INJECTION INTRAMUSCULAR; INTRAVENOUS at 03:18

## 2022-05-13 RX ADMIN — HEPARIN SODIUM 5000 UNITS: 5000 INJECTION INTRAVENOUS; SUBCUTANEOUS at 23:38

## 2022-05-13 RX ADMIN — CARVEDILOL 12.5 MG: 12.5 TABLET, FILM COATED ORAL at 08:19

## 2022-05-13 RX ADMIN — TAMSULOSIN HYDROCHLORIDE 0.4 MG: 0.4 CAPSULE ORAL at 08:19

## 2022-05-13 RX ADMIN — Medication 4 UNITS: at 18:06

## 2022-05-13 RX ADMIN — PREGABALIN 75 MG: 50 CAPSULE ORAL at 10:00

## 2022-05-13 RX ADMIN — HEPARIN SODIUM 5000 UNITS: 5000 INJECTION INTRAVENOUS; SUBCUTANEOUS at 08:19

## 2022-05-13 RX ADMIN — DULOXETINE HYDROCHLORIDE 60 MG: 30 CAPSULE, DELAYED RELEASE ORAL at 09:53

## 2022-05-13 RX ADMIN — SODIUM CHLORIDE, PRESERVATIVE FREE 10 ML: 5 INJECTION INTRAVENOUS at 23:10

## 2022-05-13 RX ADMIN — LOPERAMIDE HYDROCHLORIDE 2 MG: 2 CAPSULE ORAL at 05:23

## 2022-05-13 RX ADMIN — LABETALOL HYDROCHLORIDE 10 MG: 5 INJECTION INTRAVENOUS at 19:02

## 2022-05-13 RX ADMIN — HYDRALAZINE HYDROCHLORIDE 10 MG: 20 INJECTION INTRAMUSCULAR; INTRAVENOUS at 13:39

## 2022-05-13 NOTE — PROGRESS NOTES
05/13/22 0325   Vitals   Temp 98.1 °F (36.7 °C)   Temp Source Axillary   Pulse (Heart Rate) 82   Resp Rate 17   O2 Sat (%) 94 %   Oxygen Therapy   Pulse via Oximetry 82 beats per minute   PRN hydral  Admin at this time. Pt used own glucometer, blood glucose = 65, two apple juice given. Will recheck.

## 2022-05-13 NOTE — PROGRESS NOTES
Hospital follow-up PCP transitional care appointment has been scheduled with Dr. Kat Sykes. Mckinley on 5/16/22 at 1300. Pending patient discharge.   Olive Reynoso, Care Management Assistant

## 2022-05-13 NOTE — H&P
Hospitalist Progress Note    NAME: Deangelo Ugarte   :  1980   MRN:  [id number]     Accept note from ICU transfer    Assessment / Plan:  DKA in brittle DM 1  Hypoglycemia this AM BS 48-->80 after apple juice  --off insulin drip now (during ICU stay restarted on insulin drip as AG widened again after transition to SQ insulin), A1c 11.4 3/22, DM management consulted. Resumed on lantus 16 units (takes 18 at home), novolog with meals yesterday. Emphysematous cystitis with Klebsiella pneumoniae, POA  Recurrent Klebsiella pneumoniae uti 2022 and 3/2022  Sepsis, POA resolved  --on IV cefazolin x 7 days EDT , can switch to cefuroxime at discharge. Would treat for total 14 days since this is recurrent infection since 2022  --add probiotic while on abx    Acute urinary retention  BPH  --cont flomax  --restart finasteride  --cont hansen until fu with urology OP; patient states has appointment Monday    FELECIA on CKD 3b, improving  Right hydronephrosis, POA, resolved after hansen placement  --cont to monitor Cr  --f/u renal US showed resolution of right hydronephrosis. Cont hansen upon discharge and OP urology f/u for urodynamic study. Per patient, had been discharged with hansen in past.    HTN  --cont coreg, amlodipine    DM neuropathy  Right leg weakness x 1 year per patient with intermittent fall at home  --restart cymbalta and lyrica  --pt/ot evaluation appreciated; put on fall precaution, case management consult to set up home pt/ot    Hyperlipidemia  --restart crestor  18.5 - 24.9 Normal weight / Body mass index is 23.04 kg/m². Estimated discharge date: next 24-48 hours  Barriers: BS labile    Code status: Full  Prophylaxis: Hep SQ  Recommended Disposition:  PT, OT, RN     Subjective:     Chief Complaint / Reason for Physician Visit  \"DKA, uti\". Discussed with RN events overnight. Diarrhea last evening, associated with abx, given immodium AD.       Review of Systems:  Symptom Y/N Comments Symptom Y/N Comments   Fever/Chills    Chest Pain n    Poor Appetite    Edema     Cough n   Abdominal Pain n    Sputum n   Joint Pain     SOB/JOHNSTON    Pruritis/Rash     Nausea/vomit n   Tolerating PT/OT y Thinks he may need walker   Diarrhea y   Tolerating Diet y    Constipation    Other       Could NOT obtain due to:      Objective:     VITALS:   Last 24hrs VS reviewed since prior progress note.  Most recent are:  Patient Vitals for the past 24 hrs:   Temp Pulse Resp BP SpO2   05/13/22 0800 -- 89 14 (!) 146/96 95 %   05/13/22 0700 -- 86 16 (!) 163/94 91 %   05/13/22 0600 -- 83 13 (!) 151/93 95 %   05/13/22 0500 -- 87 17 (!) 154/86 90 %   05/13/22 0400 -- 79 9 (!) 141/81 94 %   05/13/22 0325 98.1 °F (36.7 °C) 82 17 -- 94 %   05/13/22 0315 -- 78 11 (!) 180/102 92 %   05/13/22 0300 -- 79 (!) 4 (!) 173/108 92 %   05/13/22 0200 -- 78 15 (!) 149/100 95 %   05/13/22 0100 -- 77 11 (!) 159/87 96 %   05/13/22 0000 -- 77 15 (!) 159/91 96 %   05/12/22 2315 98.3 °F (36.8 °C) 77 15 (!) 157/87 97 %   05/12/22 2300 -- 76 15 (!) 160/82 97 %   05/12/22 2245 -- 77 16 (!) 177/96 97 %   05/12/22 2230 -- 77 16 (!) 167/96 95 %   05/12/22 2225 -- 78 -- (!) 167/98 --   05/12/22 2200 -- 79 18 (!) 167/98 95 %   05/12/22 2100 -- 81 12 (!) 168/109 97 %   05/12/22 2000 -- 79 17 (!) 159/97 95 %   05/12/22 1900 97.9 °F (36.6 °C) 77 15 (!) 143/88 93 %   05/12/22 1600 98.6 °F (37 °C) 78 17 (!) 160/93 --   05/12/22 1530 -- 76 16 (!) 168/98 --   05/12/22 1500 -- 74 19 (!) 142/100 --   05/12/22 1430 -- 75 18 (!) 167/101 --   05/12/22 1400 -- 75 15 (!) 182/111 --   05/12/22 1200 98 °F (36.7 °C) 75 12 (!) 166/102 --   05/12/22 1130 -- 74 13 -- --   05/12/22 1100 -- 77 15 (!) 181/103 --   05/12/22 1030 -- 78 16 -- --   05/12/22 1000 -- 77 13 (!) 154/96 --   05/12/22 0930 -- 77 15 (!) 169/99 --       Intake/Output Summary (Last 24 hours) at 5/13/2022 0907  Last data filed at 5/13/2022 0800  Gross per 24 hour   Intake 1536.75 ml   Output 2975 ml   Net -1438.25 ml        I had a face to face encounter and independently examined this patient on 5/13/2022, as outlined below:  PHYSICAL EXAM:  General: WD, WN. Alert, cooperative, no acute distress    EENT:  EOMI. Anicteric sclerae. MMM, missing upper front teeth, dental bigg in right upper incisor  Resp:  CTA bilaterally, no wheezing or rales. No accessory muscle use, 1+ pitting edema BLE  CV:  Regular  rhythm,  No edema  GI:  Soft, Non distended, Non tender. +Bowel sounds  Neurologic:  Alert and oriented X 3, normal speech, arm strength 5/5, left leg 5/5, right leg 3/5  Psych:   Good insight. Not anxious nor agitated  Skin:  No rashes. No jaundice,  Pale    Reviewed most current lab test results and cultures  YES  Reviewed most current radiology test results   YES  Review and summation of old records today    NO  Reviewed patient's current orders and MAR    YES  PMH/ reviewed - no change compared to H&P  ________________________________________________________________________  Care Plan discussed with:    Comments   Patient y    Family      RN y    Care Manager     Consultant                        Multidiciplinary team rounds were held today with , nursing, pharmacist and clinical coordinator. Patient's plan of care was discussed; medications were reviewed and discharge planning was addressed. ________________________________________________________________________  Total NON critical care TIME:  25   Minutes      ________________________________________________________________________  Wesley Christiansen MD     Procedures: see electronic medical records for all procedures/Xrays and details which were not copied into this note but were reviewed prior to creation of Plan. LABS:  I reviewed today's most current labs and imaging studies.   Pertinent labs include:  Recent Labs     05/13/22  0328 05/11/22  0436   WBC 7.5 11.4*   HGB 9.1* 9.4*   HCT 25.8* 28.7*    468*     Recent Labs 05/13/22  0328 05/12/22  0451 05/11/22  1530 05/11/22  0521 05/11/22  0436    134* 136   < >  --    K 3.2* 3.3* 3.9   < >  --     108 110*   < >  --    CO2 19* 19* 20*   < >  --    GLU 78 176* 125*   < >  --    BUN 32* 32* 40*   < >  --    CREA 2.12* 2.34* 2.53*   < >  --    CA 7.9* 8.2* 8.0*   < >  --    MG 2.1 2.1 2.1   < >  --    PHOS 3.8  --   --   --  4.0    < > = values in this interval not displayed.        Signed: Sarah Grant MD

## 2022-05-13 NOTE — PROGRESS NOTES
Physician Progress Note      PATIENT:               Leland Roldan  CSN #:                  [id number]  :                       1980  ADMIT DATE:       2022 5:50 PM  [address] DATE:  RESPONDING  PROVIDER #:        Christel Kee MD          QUERY TEXT:    Patient admitted with Emphysematous cystitis . Documentation reflects  sepsis (H&P). If possible, please document in the progress notes and discharge summary if *** was: The medical record reflects the following:  Risk Factors: Emphysematous cystitis with Klebsiella pneumonia, nemesio, ckd3  Clinical Indicators: -Sepsis due to emphysematous cystitis and rt hydronephrosis.  H&P-Hypothermia (T 88.9) likely due to sepsis. wbc 16--17--11, lac 3.45, procal 3.40, t 87  Treatment: Clark Hidalgo,  Cindi Lo RN/CDi   Options provided:  -- sepsis  confirmed after study  -- sepsis  treated and resolved  -- sepsis  ruled out after study  -- Other - I will add my own diagnosis  -- Disagree - Not applicable / Not valid  -- Disagree - Clinically unable to determine / Unknown  -- Refer to Clinical Documentation Reviewer    PROVIDER RESPONSE TEXT:    sepsis treated and resolved.     Query created by: Concetta Cabral on 2022 11:07 AM      Electronically signed by:  Christel Kee MD 2022 11:22 AM

## 2022-05-13 NOTE — DIABETES MGMT
[facility]    CLINICAL NURSE SPECIALIST CONSULT     Initial Presentation   Raina Gómez is a 46 y.o. male admitted from the ER with altered mental status. Found to have >999 cc in bladder => Beard placed. LAB: BG 1335 with incalculable AG. Urinary glucose & ketone +. WBC 16.8. Hyponatremic. K 7.3. FELECIA. CXR: No acute process noted  CT ABd: Markedly distended urinary bladder and moderately severe right-sided hydronephrosis despite the presence of a Beard catheter; correlate with Beard catheter function. Air throughout the urinary bladder wall is suspicious for emphysematous cystitis given the clinical finding of sepsis. HX:   Past Medical History:   Diagnosis Date    Bipolar 1 disorder, depressed ([address]     Bipolar disorder ([address]     Depression     Diabetes [facility]     DKA, type 1 ([address] 1/27/2013    diagnosed age 23    H/O noncompliance with medical treatment, presenting hazards to health     MRSA (methicillin resistant staph aureus) culture positive     MRSA (methicillin resistant Staphylococcus aureus)     Face    Noncompliance with medication regimen     Second hand smoke exposure     Seizure ([address]     Seizures ([address] 2006 or 2007    one episode during half-way      INITIAL DX:   DKA (diabetic ketoacidosis) ([address] [E11.10]     Current Treatment     TX: Insulin. Heparin. ABx. BP & pain management    Consulted by Provider for advanced diabetes nursing assessment and care for:   [x] Transitioning off Beola Both   [x] Inpatient management strategy  [x] Home management assessment  [] Survival skill education    Hospital Course   Clinical progress has been complicated by need for ICU level of care. 5/9/22  retroperitoneum: The right kidney measures 13.6 cm in sagittal length. The left kidney measures 13.8 cm in sagittal length. There is no hydronephrosis. No renal cyst, calculus or mass is visualized.  The abdominal aorta and aortic bifurcation are not well visualized due to overlying bowel gas. The visualized IVC is normal. The urinary bladder is incompletely distended and a Beard catheter is present  5/10/22 Alert & oriented today. NPO. Beard+. Frequent stools  5/11/22 Alert & oriented. Ate dinner last evening and did not have mealtime insulin ordered. BG marco to 400s. No AG. Henrietta Pandya restarted this morning  5/12/22 Alert & oriented. Patient states he ate lunch and dinner meals yesterday. Remained on Glucostabilizer. BGs in 160-200s. AG remains closed  5/13/22 Alert & oriented. Eating well. On basal/mealtime/corrective strategy. Patient is more concerned about daughter who was hospitalized last evening at [facility]    Diabetes History   Per EMR, patient has Type 1 diabetes since age 23, and positive family history in mother and niece. Admission BG 1335. A1c 11.4% (3/8/22). Diabetes care per PCP, but has appointment to see endocrinologist in June. Diabetes-related Medical History  Acute complications  DKA  Other associated conditions     Depression    Diabetes Medication History  Key Antihyperglycemic Medications             insulin aspart U-100 (NovoLOG Flexpen U-100 Insulin) 100 unit/mL (3 mL) inpn (Taking) 2 Units by SubCUTAneous route Before breakfast, lunch, and dinner. Lantus insulin 16 units D     Diabetes self-management practices: Patient reports taking standard doses of basal and mealtime insulin. He is using a [employer]@ CGM system and corrects when he sees high blood glucoses; he gives as much as 20 units to correct. He is injecting into his belly and has evidence of overuse in the areas adjacent to his umbilicus. Patient eats a lunch & evening meal. Drinks coffee and unsweetened tea. Subjective   \"My daughter was admitted to [facility]. I want to be with her. \"     Objective   Physical exam  General Normal weight male who is in no acute distress  Neuro  Alert & oriented  Vital Signs   Visit Vitals  BP (!) 146/96 (BP 1 Location: Right upper arm, BP Patient Position: At rest)   Pulse 89   Temp 98.1 °F (36.7 °C)   Resp 14   Ht 5' 9.02\" (1.753 m)   Wt 70.8 kg (156 lb 1.4 oz)   SpO2 95%   BMI 23.04 kg/m²     Laboratory  Recent Labs     05/13/22  0328 05/12/22  0451 05/11/22  1530 05/11/22  0521 05/11/22  0436   GLU 78 176* 125*   < >  --    AGAP 9 7 6   < >  --    WBC 7.5  --   --   --  11.4*   CREA 2.12* 2.34* 2.53*   < >  --    GFRNA 35* 31* 29*   < >  --     < > = values in this interval not displayed. Factors impacting BG management  Factor Dose Comments   Nutrition:  Standard meals   Carb controlled 45 gms/meal   Eating well   Infection Ancef Q8 hrs Afebrile. WBC normalize  Blood cultures negative. Urine with gram negative rods   Other:   Kidney function  Liver function   FELECIA  Liver enzymes normal      Blood glucose pattern      Significant diabetes-related events over the past 24-72 hours  Admitted in DKA with BG 1335 and incalculable AG. Received fluid resuscitation & insulin  AG closed 5/9/22 X2 @530pm 5/9/22 => not transitioned off GS  AG has reopened 630am 5/10/22 => switched to D10 @ 50cc/hr @8am. Transitioned off GS @5pm. Given food without mealtime coverage. BGs marco to 300-400s. Placed back on GS @9am 5/11/22  Transitioned off GS with 16 units of Lantus insulin (after receiving 3 units earlier in the day)    Assessment and Plan   Nursing Diagnosis Risk for unstable blood glucose pattern   Nursing Intervention Domain 6898 Decision-making Support   Nursing Interventions Examined current inpatient diabetes/blood glucose control   Explored factors facilitating and impeding inpatient management  Explored corrective strategies with patient and responsible inpatient provider   Informed patient of rational for insulin strategy while hospitalized     Evaluation   This normal weight  male with known Type 1 diabetes was admitted in DKA, as evidenced by BG 1335 & incalculable AG.  In our conversation today, he reports taking his insulins regularly. He has been using a [employer] CGM system to detect BG patterns and address accordingly. He was surprised by this event and the need for hospitalization. At this point in his hospital stay, an appropriate home strategy has emerged. He should remain on Lantus insulin 16 units D for basal needs. He knows that he needs to focus on determining and dosing mealtime insulin. Encouraged to use [employer]" phone renetta (free) to learn carb loads of common foods he is choosing so he can dose using a 1:15 ratio. Recommendations     Use of Subcutaneous Insulin Order set (9653)  Insulin Dosing Specific recommendation   Basal                                      (Based on weight, BMI & GFR) [x]        0.2 units/kg/D  [] 0.3 units/kg/D  [] 0.4 units/kg/D Transition off glucostabilizer with 16 units of Lantus insulin   Nutritional                                      (Based on CHO/dextrose load) [x] Normal sensitivity  [] Insulin-resistant sensitivity Humalog insulin 4 units with consumed meals   Corrective                                       (Useful in adjusting insulin dosing) [] Normal sensitivity  [x] HIGH sensitivity  [] Insulin-resistant sensitivity      Billing Code(s)   [x] 35 minutes    Before making these care recommendations, I personally reviewed the hospitalization record, including notes, laboratory & diagnostic data and current medications, and examined the patient at the bedside (circumstances permitting) before making care recommendations. More than fifty (50) percent of the time was spent in patient counseling and/or care coordination.   Total minutes: [address], CNS  Diabetes Clinical Nurse Specialist  [facility]  Access via [address] [place] [address]

## 2022-05-13 NOTE — PROGRESS NOTES
Transition of Care Plan:     RUR:  25%  Disposition:  Home w/ OP f/u  Follow up appointments:  PCP; Endocr; Diabetes Educ  DME needed:  Likely none  Transportation at Discharge: Mother or Medicaid car  [address] or means to access home:   mother     IM Medicare Letter:  n/a  Is patient a BCPI-A Bundle: If yes, was Bundle Letter given?:    Is patient a [place] and connected with the [place]?    n/a            If yes, was [employer] transfer form completed and [facility] notified? Caregiver Contact:  Khadijah HillYtppfy-dys-[phone]  Discharge Caregiver contacted prior to discharge? Care Conference needed?:          Numerous referrals sent out for home health services. Diana Aparicio, [facility] [address] , [facility] and [facility] have all declined. [facility] and awaiting a call back. Referrals sent out to [facility] ([facility]), [facility] and awaiting those responses. Message left for weekend CM to follow up. Lashonda Rivero RN BSN CRM        [phone]

## 2022-05-13 NOTE — PROGRESS NOTES
[facility]    ICU TEAM Progress Note    Name: Ronald Oneal   : 1980   MRN: [id number]   Date: 2022           ICU Assessment & Plan of Emely Ray Is a 46 y.o. male with bipolar disorder, HTN, DM1 admitted  with DKA. NEURO  #. Pain, agitation, delirium  - tylenol prn    CARDIAC  No acute    RESPIRATORY  No acute    RENAL  #. FELECIA  #. Emphysematous cystitis, R hydro  - Urology consulted; greatly appreciate assistance  - Beard to stay until outpatient appt  - e- repletion prn  - abx as below    GASTROINTESTINAL  #. At risk for malnutrition  - cont diet  - Nutrition following    HEMATOLOGIC  #. Anemia, likely AoCD  - no indication for transfusion at this time; monitor    ID  #. Klebs pna UTI  - cefazolin x7d total therapy -- pt refusing some doses    ENDOCRINE  #. T1DM with DKA  - transitioned for third time yesterday -- greatly appreciate Diabetes Mgt assistance  - hypoglycemic this morning though pt largely asx as pt is so brittle and labile  - able to take apple juice   - recheck as per protocol      DVT Prophylaxis: SCDs, heparin  U - Ulcer Prophylaxis: not indicated  G - Glycemic Control: Insulin  B - Bowel Regimen: miralax prn  Tubes: None  Lines: Peripheral IV  Drains: None     Ok to transfer out of ICU from [facility]. Subjective:   Progress Note: 2022      Reason for ICU Admission: DKA     HPI: (per prior clinicians) 45 y/o with pmh of bipolar disorder, HTN, DM1 presents to [facility] ICU for AMS. Upon arrival HDS, lethargic and disoriented, opens eyes to voice and follows commands. Lab work done and significant for pH 6.95 with a bicarb of 3. Glucose 1335,  (corrected 136), K 7.3, Cr 3.76, WBC 16.8. Initially temp unable to be obtained, core temp taken and showed a temp of 88.9 deg F.  Placed on warming blanket. Given 2 L IVF, calcium, and started on an insulin gtt.   ICU consulted for admission.     Upon my arrival pt still HDS and lethargic and disoriented. Physical exam significant for lower abdomen distention, rigidity and tenderness. Bladder scan done and showed >999. Beard placed. Will get CT abd to r/o intra abdominal source of infection. Pt will be admitted to the ICU for further management.     Called back to bedside bc pt agitated and pulled out one of his IVs,unable to to get IV access. Also Map 62. Upon my assessment pt agitated, somewhat redirectable. I placed a ultrasound guided 20 karissa IV in the rt basilic vein and severo all labs. I accompanied pt and nurse to CT (to r/o intra abdominal source of infection) since BP borderline. Pt then transferred to ICU without complication. Overnight Events:   5/9: Gap decreasing. 5/10: gap closed yesterday evening but left on gtt to transition in AM. Gap reopened this AM.    5/11: attempted to transition yesterday evening after two closed gaps, but reopened again this morning. Back on insulin gtt. Diabetes Mgt following.  5/12: pt now tolerating diet better. Gap closer to closed (accounting for albumin) last night, but stayed on insulin gtt given high likelihood of reopening overnight again. Plan for transition today. 5/13: transitioned off insulin gtt yesterday afternoon. Did well overnight with some asx hypoglycemia this morning. Able to take apple juice. Eating breakfast at bedside. Home Medications:     Prior to Admission medications    Medication Sig Start Date End Date Taking? Authorizing Provider   tamsulosin (FLOMAX) 0.4 mg capsule Take 0.4 mg by mouth daily. Yes Provider, Historical   insulin aspart U-100 (NovoLOG Flexpen U-100 Insulin) 100 unit/mL (3 mL) inpn 2 Units by SubCUTAneous route Before breakfast, lunch, and dinner. 3/12/22  Yes Anabelle Soler MD   ondansetron (ZOFRAN ODT) 8 mg disintegrating tablet Take 1 Tablet by mouth every eight (8) hours as needed for Nausea or Vomiting.  3/3/22  Yes Lionel Sport, DO   pen needle, diabetic 30 gauge x 3/16\" ndle 5 Units by Does Not Apply route Before breakfast, lunch, and dinner. 2/23/22  Yes Geovanni Cardozo MD   amLODIPine (NORVASC) 5 mg tablet Take 1 Tablet by mouth daily. 2/15/22  Yes Emanuel Ramesh MD   carvediloL (COREG) 12.5 mg tablet Take 1 Tablet by mouth two (2) times daily (with meals). 2/14/22  Yes Emanuel Ramesh MD   finasteride (PROSCAR) 5 mg tablet Take 1 Tablet by mouth daily. 2/15/22  Yes Emanuel Ramesh MD   rosuvastatin (CRESTOR) 40 mg tablet Take 1 Tablet by mouth nightly. 1/27/22  Yes Geovanni Cardozo MD   ondansetron (ZOFRAN ODT) 4 mg disintegrating tablet Take 1 Tablet by mouth every eight (8) hours as needed for Nausea or Vomiting. 12/28/21  Yes Di Gr MD   DULoxetine (CYMBALTA) 60 mg capsule Take 1 Capsule by mouth daily. 12/13/21  Yes Geovanni Cardozo MD   pantoprazole (Protonix) 40 mg tablet Take 1 Tablet by mouth daily. 12/6/21  Yes Kate Thompson MD   pregabalin (Lyrica) 75 mg capsule Take 75 mg by mouth two (2) times a day. Yes Provider, Historical   naloxone (Narcan) 4 mg/actuation nasal spray Use 1 spray intranasally, then discard. Repeat with new spray every 2 min as needed for opioid overdose symptoms, alternating nostrils.  2/23/22   Geovanni Cardozo MD   ergocalciferol (ERGOCALCIFEROL) 1,250 mcg (50,000 unit) capsule Take 1 capsule by mouth once a week  Patient not taking: Reported on 5/10/2022 10/30/21   Geovanni Cardozo MD       Current Meds:     Current Facility-Administered Medications   Medication Dose Route Frequency    labetaloL (NORMODYNE;TRANDATE) injection 10 mg  10 mg IntraVENous Q4H PRN    insulin glargine (LANTUS) injection 16 Units  16 Units SubCUTAneous QHS    insulin lispro (HUMALOG) injection 4 Units  4 Units SubCUTAneous TIDAC    insulin lispro (HUMALOG) injection   SubCUTAneous AC&HS    loperamide (IMODIUM) capsule 2 mg  2 mg Oral Q4H PRN    hydrALAZINE (APRESOLINE) 20 mg/mL injection 10 mg  10 mg IntraVENous Q4H PRN    calcium carbonate (TUMS) chewable tablet 200 mg [elemental]  200 mg Oral PRN    prochlorperazine (COMPAZINE) with saline injection 5 mg  5 mg IntraVENous Q4H PRN    alcohol 62% (NOZIN) nasal  1 Ampule  1 Ampule Topical Q12H    ceFAZolin (ANCEF) 1,000 mg in sterile water (preservative free) injection  1,000 mg IntraVENous Q8H    glucose chewable tablet 16 g  4 Tablet Oral PRN    glucagon (GLUCAGEN) injection 1 mg  1 mg IntraMUSCular PRN    dextrose 10% infusion 0-250 mL  0-250 mL IntraVENous PRN    amLODIPine (NORVASC) tablet 5 mg  5 mg Oral DAILY    carvediloL (COREG) tablet 12.5 mg  12.5 mg Oral BID WITH MEALS    tamsulosin (FLOMAX) capsule 0.4 mg  0.4 mg Oral DAILY    heparin (porcine) injection 5,000 Units  5,000 Units SubCUTAneous Q8H    sodium chloride (NS) flush 5-40 mL  5-40 mL IntraVENous Q8H    sodium chloride (NS) flush 5-40 mL  5-40 mL IntraVENous PRN    acetaminophen (TYLENOL) tablet 650 mg  650 mg Oral Q6H PRN    Or    acetaminophen (TYLENOL) suppository 650 mg  650 mg Rectal Q6H PRN    polyethylene glycol (MIRALAX) packet 17 g  17 g Oral DAILY PRN    ondansetron (ZOFRAN ODT) tablet 4 mg  4 mg Oral Q8H PRN    Or    ondansetron (ZOFRAN) injection 4 mg  4 mg IntraVENous Q6H PRN       Objective:   Vital Signs:  Visit Vitals  BP (!) 146/96   Pulse 89   Temp 98.1 °F (36.7 °C)   Resp 14   Ht 5' 9.02\" (1.753 m)   Wt 70.8 kg (156 lb 1.4 oz)   SpO2 95%   BMI 23.04 kg/m²      O2 Device: None (Room air) Temp (24hrs), Av.2 °F (36.8 °C), Min:97.9 °F (36.6 °C), Max:98.6 °F (37 °C)           Intake/Output:     Intake/Output Summary (Last 24 hours) at 2022 0817  Last data filed at 2022 0600  Gross per 24 hour   Intake 1686.75 ml   Output 2700 ml   Net -1013.25 ml       Physical Exam:   Awake, alert, appropriate, eating breakfast in bed  Resps even and unlabored, symmetric chest rise on RA  Reg rate, no heave/rub  Peripheral pulses intact  Abd soft, nondistended  Skin warm, dry  MAEW      LABS AND  DATA: Personally reviewed  Recent Labs     05/13/22  0328 05/11/22  0436   WBC 7.5 11.4*   HGB 9.1* 9.4*   HCT 25.8* 28.7*    468*     Recent Labs     05/13/22  0328 05/12/22  0451 05/11/22  0521 05/11/22  0436    134*   < >  --    K 3.2* 3.3*   < >  --     108   < >  --    CO2 19* 19*   < >  --    BUN 32* 32*   < >  --    CREA 2.12* 2.34*   < >  --    GLU 78 176*   < >  --    CA 7.9* 8.2*   < >  --    MG 2.1 2.1   < >  --    PHOS 3.8  --   --  4.0    < > = values in this interval not displayed. No results for input(s): AP, TBIL, TP, ALB, GLOB, AML, LPSE in the last 72 hours. No lab exists for component: SGOT, GPT, AMYP  No results for input(s): INR, PTP, APTT, INREXT, INREXT in the last 72 hours. No results for input(s): PHI, PCO2I, PO2I, FIO2I in the last 72 hours. No results for input(s): CPK, CKMB, TROIQ, BNPP in the last 72 hours. MEDS: Reviewed    Chest X-Ray:  CXR Results  (Last 48 hours)    None        Multidisciplinary Rounds Completed:  Pending    ABCDEF Bundle/Checklist Completed:  Yes    SPECIAL EQUIPMENT  None    DISPOSITION  Stay in ICU    CRITICAL CARE CONSULTANT NOTE  I had a face to face encounter with the patient, reviewed and interpreted patient data including clinical events, labs, images, vital signs, I/O's, and examined patient. I have discussed the case and the plan and management of the patient's care with the consulting services, the bedside nurses and the respiratory therapist.      NOTE OF PERSONAL INVOLVEMENT IN CARE   This patient has a high probability of imminent, clinically significant deterioration, which requires the highest level of preparedness to intervene urgently. I participated in the decision-making and personally managed or directed the management of the following life and organ supporting interventions that required my frequent assessment to treat or prevent imminent deterioration.     I personally spent 35 minutes of critical care time. This is time spent at this critically ill patient's bedside actively involved in patient care as well as the coordination of care. This does not include any procedural time which has been billed separately.     Rosalinda Miller MD  Intensivist  5/13/2022

## 2022-05-13 NOTE — PROGRESS NOTES
0700- Bedside shift change report given to Noemi (oncoming nurse) by Ashwini Ayala (offgoing nurse). Report included the following information SBAR, Kardex, Intake/Output, MAR, Accordion and Recent Results. 0805- Pt hypoglycemic with BG 48. BG checked and treated multiple times with a total of 12oz of apple juice. At 0900 BG 80.     1815- TRANSFER - OUT REPORT:    Verbal report given to Cyndi(name) on Zari Glynn  being transferred to [facility](unit) for routine progression of care       Report consisted of patients Situation, Background, Assessment and   Recommendations(SBAR). Information from the following report(s) SBAR, Kardex, Intake/Output, MAR, Accordion and Recent Results was reviewed with the receiving nurse. Lines:   Peripheral IV 82/19/57 Left Basilic (Active)   Site Assessment Clean, dry, & intact 05/13/22 1200   Phlebitis Assessment 0 05/13/22 1200   Infiltration Assessment 0 05/13/22 1200   Dressing Status Clean, dry, & intact 05/13/22 1200   Dressing Type Transparent 05/13/22 1200   Hub Color/Line Status Green;Capped 05/13/22 1200   Action Taken Open ports on tubing capped 05/13/22 0325   Alcohol Cap Used Yes 05/13/22 1200       Peripheral IV 05/08/22 Right Other(comment) (Active)   Site Assessment Clean, dry, & intact 05/13/22 1200   Phlebitis Assessment 0 05/13/22 1200   Infiltration Assessment 0 05/13/22 1200   Dressing Status Clean, dry, & intact 05/13/22 1200   Dressing Type Transparent 05/13/22 1200   Hub Color/Line Status Pink;Capped 05/13/22 1200   Action Taken Open ports on tubing capped 05/13/22 0325   Alcohol Cap Used Yes 05/13/22 1200        Opportunity for questions and clarification was provided.       Patient transported with:   Monitor  Registered Nurse  Tech

## 2022-05-14 ENCOUNTER — HOSPITAL ENCOUNTER (EMERGENCY)
Age: 42
Discharge: LEFT BEFORE DISCHARGE | End: 2022-05-14
Payer: COMMERCIAL

## 2022-05-14 VITALS
SYSTOLIC BLOOD PRESSURE: 157 MMHG | DIASTOLIC BLOOD PRESSURE: 89 MMHG | RESPIRATION RATE: 18 BRPM | BODY MASS INDEX: 23.11 KG/M2 | HEIGHT: 69 IN | OXYGEN SATURATION: 98 % | HEART RATE: 86 BPM | WEIGHT: 156 LBS

## 2022-05-14 VITALS
HEIGHT: 69 IN | RESPIRATION RATE: 18 BRPM | DIASTOLIC BLOOD PRESSURE: 97 MMHG | HEART RATE: 85 BPM | BODY MASS INDEX: 23.12 KG/M2 | WEIGHT: 156.09 LBS | OXYGEN SATURATION: 96 % | SYSTOLIC BLOOD PRESSURE: 155 MMHG | TEMPERATURE: 98.7 F

## 2022-05-14 LAB
ANION GAP SERPL CALC-SCNC: 7 MMOL/L (ref 5–15)
BUN SERPL-MCNC: 34 MG/DL (ref 6–20)
BUN/CREAT SERPL: 16 (ref 12–20)
CALCIUM SERPL-MCNC: 8.1 MG/DL (ref 8.5–10.1)
CHLORIDE SERPL-SCNC: 106 MMOL/L (ref 97–108)
CO2 SERPL-SCNC: 21 MMOL/L (ref 21–32)
CREAT SERPL-MCNC: 2.11 MG/DL (ref 0.7–1.3)
ERYTHROCYTE [DISTWIDTH] IN BLOOD BY AUTOMATED COUNT: 13.6 % (ref 11.5–14.5)
GLUCOSE BLD STRIP.AUTO-MCNC: 133 MG/DL (ref 65–117)
GLUCOSE BLD STRIP.AUTO-MCNC: 195 MG/DL (ref 65–117)
GLUCOSE BLD STRIP.AUTO-MCNC: 92 MG/DL (ref 65–117)
GLUCOSE SERPL-MCNC: 95 MG/DL (ref 65–100)
HCT VFR BLD AUTO: 26 % (ref 36.6–50.3)
HGB BLD-MCNC: 8.5 G/DL (ref 12.1–17)
MAGNESIUM SERPL-MCNC: 2.2 MG/DL (ref 1.6–2.4)
MCH RBC QN AUTO: 28.7 PG (ref 26–34)
MCHC RBC AUTO-ENTMCNC: 32.7 G/DL (ref 30–36.5)
MCV RBC AUTO: 87.8 FL (ref 80–99)
NRBC # BLD: 0 K/UL (ref 0–0.01)
NRBC BLD-RTO: 0 PER 100 WBC
PHOSPHATE SERPL-MCNC: 3.5 MG/DL (ref 2.6–4.7)
PLATELET # BLD AUTO: 388 K/UL (ref 150–400)
PMV BLD AUTO: 10 FL (ref 8.9–12.9)
POTASSIUM SERPL-SCNC: 4.1 MMOL/L (ref 3.5–5.1)
RBC # BLD AUTO: 2.96 M/UL (ref 4.1–5.7)
SERVICE CMNT-IMP: ABNORMAL
SERVICE CMNT-IMP: ABNORMAL
SERVICE CMNT-IMP: NORMAL
SODIUM SERPL-SCNC: 134 MMOL/L (ref 136–145)
WBC # BLD AUTO: 8.5 K/UL (ref 4.1–11.1)

## 2022-05-14 PROCEDURE — 85027 COMPLETE CBC AUTOMATED: CPT

## 2022-05-14 PROCEDURE — 74011000250 HC RX REV CODE- 250: Performed by: NURSE PRACTITIONER

## 2022-05-14 PROCEDURE — 74011000250 HC RX REV CODE- 250: Performed by: INTERNAL MEDICINE

## 2022-05-14 PROCEDURE — 36415 COLL VENOUS BLD VENIPUNCTURE: CPT

## 2022-05-14 PROCEDURE — 84100 ASSAY OF PHOSPHORUS: CPT

## 2022-05-14 PROCEDURE — 74011250636 HC RX REV CODE- 250/636: Performed by: INTERNAL MEDICINE

## 2022-05-14 PROCEDURE — 74011250637 HC RX REV CODE- 250/637: Performed by: NURSE PRACTITIONER

## 2022-05-14 PROCEDURE — 82962 GLUCOSE BLOOD TEST: CPT

## 2022-05-14 PROCEDURE — 74011250637 HC RX REV CODE- 250/637: Performed by: INTERNAL MEDICINE

## 2022-05-14 PROCEDURE — 74011636637 HC RX REV CODE- 636/637: Performed by: CLINICAL NURSE SPECIALIST

## 2022-05-14 PROCEDURE — 74011250637 HC RX REV CODE- 250/637: Performed by: HOSPITALIST

## 2022-05-14 PROCEDURE — 80048 BASIC METABOLIC PNL TOTAL CA: CPT

## 2022-05-14 PROCEDURE — 83735 ASSAY OF MAGNESIUM: CPT

## 2022-05-14 PROCEDURE — 75810000275 HC EMERGENCY DEPT VISIT NO LEVEL OF CARE

## 2022-05-14 RX ORDER — LEVOFLOXACIN 750 MG/1
750 TABLET ORAL DAILY
Qty: 8 TABLET | Refills: 0 | Status: SHIPPED | OUTPATIENT
Start: 2022-05-14 | End: 2022-05-21

## 2022-05-14 RX ORDER — INSULIN GLARGINE 100 [IU]/ML
16 INJECTION, SOLUTION SUBCUTANEOUS DAILY
Qty: 1 ML | Refills: 0 | Status: SHIPPED
Start: 2022-05-14 | End: 2022-06-21 | Stop reason: SDUPTHER

## 2022-05-14 RX ADMIN — Medication 4 UNITS: at 09:00

## 2022-05-14 RX ADMIN — PREGABALIN 75 MG: 50 CAPSULE ORAL at 09:01

## 2022-05-14 RX ADMIN — DULOXETINE HYDROCHLORIDE 60 MG: 30 CAPSULE, DELAYED RELEASE ORAL at 09:00

## 2022-05-14 RX ADMIN — Medication 4 UNITS: at 12:11

## 2022-05-14 RX ADMIN — TAMSULOSIN HYDROCHLORIDE 0.4 MG: 0.4 CAPSULE ORAL at 09:01

## 2022-05-14 RX ADMIN — HEPARIN SODIUM 5000 UNITS: 5000 INJECTION INTRAVENOUS; SUBCUTANEOUS at 09:00

## 2022-05-14 RX ADMIN — SODIUM CHLORIDE, PRESERVATIVE FREE 10 ML: 5 INJECTION INTRAVENOUS at 05:59

## 2022-05-14 RX ADMIN — Medication 1 CAPSULE: at 09:01

## 2022-05-14 RX ADMIN — AMLODIPINE BESYLATE 5 MG: 5 TABLET ORAL at 09:01

## 2022-05-14 RX ADMIN — Medication 1 AMPULE: at 09:00

## 2022-05-14 RX ADMIN — CARVEDILOL 12.5 MG: 12.5 TABLET, FILM COATED ORAL at 09:01

## 2022-05-14 RX ADMIN — CEFAZOLIN SODIUM 1000 MG: 1 INJECTION, POWDER, FOR SOLUTION INTRAMUSCULAR; INTRAVENOUS at 05:58

## 2022-05-14 RX ADMIN — FINASTERIDE 5 MG: 5 TABLET, FILM COATED ORAL at 09:00

## 2022-05-14 RX ADMIN — LABETALOL HYDROCHLORIDE 10 MG: 5 INJECTION INTRAVENOUS at 09:04

## 2022-05-14 NOTE — DISCHARGE SUMMARY
Hospitalist Discharge Summary     Patient ID:  Nikole Mojica  [id number]  89 y.o.  1980 5/8/2022    PCP on record: Darrion Lee MD    Admit date: 5/8/2022  Discharge date and time: 5/14/2022    DISCHARGE DIAGNOSIS:    DKA in brittle DM 1  Emphysematous cystitis with Klebsiella pneumoniae, POA  Recurrent Klebsiella pneumoniae uti 2/2022 and 3/2022  Sepsis, POA resolved     Acute urinary retention  BPH  FELECIA on CKD 3b  HTN      CONSULTATIONS:  IP CONSULT TO UROLOGY    Excerpted HPI from H&P of Cheryl Dickson MD:  45 y/o with pmh of bipolar disorder, HTN, DM1 presents to [id number] [address] [facility] ICU for AMS. Upon arrival HDS, lethargic and disoriented, opens eyes to voice and follows commands. Lab work done and significant for pH 6.95 with a bicarb of 3. Glucose 1335,  (corrected 136), K 7.3, Cr 3.76, WBC 16.8. Initially temp unable to be obtained, core temp taken and showed a temp of 88.9 deg F.  Placed on warming blanket. Given 2 L IVF, calcium, and started on an insulin gtt. ICU consulted for admission.     Upon my arrival pt still HDS and lethargic and disoriented. Physical exam significant for lower abdomen distention, rigidity and tenderness. Bladder scan done and showed >999. Beard placed. Will get CT abd to r/o intra abdominal source of infection. Pt will be admitted to the ICU for further management.     Called back to bedside bc pt agitated and pulled out one of his IVs,unable to to get IV access. Also Map 62. Upon my assessment pt agitated, somewhat redirectable. I placed a ultrasound guided 20 karissa IV in the rt basilic vein and severo all labs. I accompanied pt and nurse to CT (to r/o intra abdominal source of infection) since BP borderline. Pt then transferred to ICU without complication.       ______________________________________________________________________  DISCHARGE SUMMARY/HOSPITAL COURSE:  for full details see H&P, daily progress notes, labs, consult notes. DKA in brittle DM 1  Hypoglycemia this AM BS 48-->80 after apple juice  --off insulin drip now (during ICU stay restarted on insulin drip as AG widened again after transition to SQ insulin), A1c 11.4 3/22, DM management consulted. Resume Lantus. Patient takes Lantus at home. Advised to take it regularly. He does not need refill of Lantus      Emphysematous cystitis with Klebsiella pneumoniae, POA  Recurrent Klebsiella pneumoniae uti 2/2022 and 3/2022  Sepsis, POA resolved  --on IV cefazolin x 7 days. Cefuroxime has a poor bioavailability. Switched to Levaquin 750 mg daily. Creatinine clearance 47. Expected to improve       Acute urinary retention  BPH  --cont flomax  --restart finasteride  --cont hansen until fu with urology OP; patient states has appointment Monday     FELECIA on CKD 3b, improving  Right hydronephrosis, POA, resolved after hansen placement  --cont to monitor Cr  --f/u renal US showed resolution of right hydronephrosis. Cont hansen upon discharge and OP urology f/u for urodynamic study. Per patient, had been discharged with hansen in past.     HTN  --cont coreg, amlodipine     DM neuropathy  Right leg weakness x 1 year per patient with intermittent fall at home  --restart cymbalta and lyrica  --pt/ot evaluation appreciated.         _______________________________________________________________________  Patient seen and examined by me on discharge day. Pertinent Findings:  Gen:    Not in distress  Chest: Clear lungs  CVS:   Regular rhythm. No edema  Abd:  Soft, not distended, not tender  Neuro:  Alert,   _______________________________________________________________________  DISCHARGE MEDICATIONS:   Current Discharge Medication List      START taking these medications    Details   levoFLOXacin (LEVAQUIN) 750 mg tablet Take 1 Tablet by mouth daily for 7 days.   Qty: 8 Tablet, Refills: 0  Start date: 5/14/2022, End date: 5/21/2022      insulin glargine (LANTUS) 100 unit/mL injection 16 Units by SubCUTAneous route daily. Qty: 1 mL, Refills: 0  Start date: 5/14/2022         CONTINUE these medications which have NOT CHANGED    Details   tamsulosin (FLOMAX) 0.4 mg capsule Take 0.4 mg by mouth daily. insulin aspart U-100 (NovoLOG Flexpen U-100 Insulin) 100 unit/mL (3 mL) inpn 2 Units by SubCUTAneous route Before breakfast, lunch, and dinner. Qty: 10 Adjustable Dose Pre-filled Pen Syringe, Refills: 3      pen needle, diabetic 30 gauge x 3/16\" ndle 5 Units by Does Not Apply route Before breakfast, lunch, and dinner. Qty: 100 Each, Refills: 3    Associated Diagnoses: DM type 2, goal HbA1c < 7% ([facility])      amLODIPine (NORVASC) 5 mg tablet Take 1 Tablet by mouth daily. Qty: 30 Tablet, Refills: 1      carvediloL (COREG) 12.5 mg tablet Take 1 Tablet by mouth two (2) times daily (with meals). Qty: 60 Tablet, Refills: 1      finasteride (PROSCAR) 5 mg tablet Take 1 Tablet by mouth daily. Qty: 30 Tablet, Refills: 2      rosuvastatin (CRESTOR) 40 mg tablet Take 1 Tablet by mouth nightly. Qty: 30 Tablet, Refills: 0    Associated Diagnoses: Hyperlipidemia, unspecified hyperlipidemia type      ondansetron (ZOFRAN ODT) 4 mg disintegrating tablet Take 1 Tablet by mouth every eight (8) hours as needed for Nausea or Vomiting. Qty: 20 Tablet, Refills: 0      DULoxetine (CYMBALTA) 60 mg capsule Take 1 Capsule by mouth daily. 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 Qty: 30 Tablet, Refills: 0      pregabalin (Lyrica) 75 mg capsule Take 75 mg by mouth two (2) times a day.      naloxone (Narcan) 4 mg/actuation nasal spray Use 1 spray intranasally, then discard. Repeat with new spray every 2 min as needed for opioid overdose symptoms, alternating nostrils.   Qty: 1 Each, Refills: 0    Associated Diagnoses: Right hip pain      ergocalciferol (ERGOCALCIFEROL) 1,250 mcg (50,000 unit) capsule Take 1 capsule by mouth once a week  Qty: 12 Capsule, Refills: 0    Associated Diagnoses: Vitamin D deficiency               Patient Follow Up Instructions: Activity: Activity as tolerated  Diet: Resume previous diet  Wound Care: None needed        Follow-up Information     Follow up With Specialties Details Why Miguel Martin MD Internal Medicine Physician Go on 5/16/2022 at 1:00pm for your PCP hospital follow up. Please arrive 15 minutes early, bring photo ID, insurance cards, copay, medication bottles and any completed forms. [address] Urology  On 5/16/2022 9:30 am follow up with voiding trial at [facility] office  [address] [place] [address], MD Internal Medicine Physician  Please contact your primary care provider to arrange home health services.  Physical therapy and occupational therapy at least 2 days a week in the home is suggested  [address] [address]  [phone]          ________________________________________________________________    Risk of deterioration: Low    Condition at Discharge:  Stable  __________________________________________________________________    Disposition  Home with family, no needs    ____________________________________________________________________    Code Status: Full Code  ___________________________________________________________________      Total time in minutes spent coordinating this discharge (includes going over instructions, follow-up, prescriptions, and preparing report for sign off to her PCP) :  >30 minutes    Signed:  Jerry Argueta MD

## 2022-05-14 NOTE — DISCHARGE INSTRUCTIONS
HOSPITALIST DISCHARGE INSTRUCTIONS    NAME: Siria Bruce   :  1980   MRN:  [id number]     Date/Time:  2022 11:39 AM    ADMIT DATE: 2022   DISCHARGE DATE: 2022     DKA in brittle DM 1  Emphysematous cystitis with Klebsiella pneumoniae, POA  Recurrent Klebsiella pneumoniae uti 2022 and 3/2022  Sepsis, POA resolved     Acute urinary retention  BPH  FELECIA on CKD 3b  HTN    · It is important that you take the medication exactly as they are prescribed. · Keep your medication in the bottles provided by the pharmacist and keep a list of the medication names, dosages, and times to be taken in your wallet. · Do not take other medications without consulting your doctor. What to do at Home    Recommended diet:  Diabetic Diet    Recommended activity: Activity as tolerated      If you have questions regarding the hospital related prescriptions or hospital related issues please call [facility] at [phone]. You can always direct your questions to your primary care doctor if you are unable to reach your hospital physician; your PCP works as an extension of your hospital doctor just like your hospital doctor is an extension of your PCP for your time at the hospital [facility], [facility])    If you experience any of the following symptoms then please call your primary care physician or return to the emergency room if you cannot get hold of your doctor:    Fever, chills, nausea, vomiting, or persistent diarrhea  Worsening weakness or new problems with your speech or balance  Dark stools or visible blood in your stools  New Leg swelling or shortness of breath as these could be signs of a clot    Additional Instructions:      Bring these papers with you to your follow up appointments.  The papers will help your doctors be sure to continue the care plan from the hospital.              Information obtained by :  I understand that if any problems occur once I am at home I am to contact my physician. I understand and acknowledge receipt of the instructions indicated above.                                                                                                                                            Physician's or R.N.'s Signature                                                                  Date/Time                                                                                                                                              Patient or Representative Signature

## 2022-05-14 NOTE — PROGRESS NOTES
End of Shift Note    Bedside shift change report given to Padmini Vargas RN (oncoming nurse) by Tania Shay RN (offgoing nurse). Report included the following information SBAR, Kardex, Intake/Output, MAR, Accordion, Recent Results and Med Rec Status    Shift worked:  7PM-7AM     Shift summary and any significant changes:     Blood glucose  of 84, lantus held per NP orders.      Concerns for physician to address:  As above     Zone phone for oncoming shift:              Tania Shay RN

## 2022-05-14 NOTE — PROGRESS NOTES
Received notification from bedside RN about patient with regards to: BG 84, has scheduled Lantus 16 units    Intervention given: Hold this evening dose of Lantus ordered

## 2022-05-14 NOTE — PROGRESS NOTES
Transition of Care Plan:     RUR:  25%  Disposition:  Home w/ OP f/u  Follow up appointments:  PCP; Endocr; Diabetes Educ, Home health services (PT/OT)  DME needed:  Likely none  Transportation at [facility]. [address] or Medicaid car  Minta Childes or means to access home:   mother     IM Medicare Letter:  n/a  Is patient a BCPI-A Bundle:                      If yes, was Bundle Letter given?:    Is patient a [place] and connected with the [facility]?    n/a            If yes, was [employer] transfer form completed and [facility] notified? Caregiver Contact:  Khadijah [phone]  Discharge Caregiver contacted prior to discharge? Care Conference needed?:            CM unable to arrange [facility] services for discharge. Several agencies have denied pt. due to not being in network with medical carrier. Customer service for [place] plan is currently closed to verify in network [facility] providers. [employer] message sent to CHARLY Sunshine asking if  patient could discharge home and contact his PCP to arrange [facility] services. CHARLY Pickens in agreement with plan. CM communicated information to patient and included plan of care on the AVS. Patient mother to provide transportation. No further CM needs identified.     Zuly De Luna, MSN, RN  Care Manager

## 2022-05-15 LAB
BACTERIA SPEC CULT: NORMAL
SERVICE CMNT-IMP: NORMAL

## 2022-05-17 ENCOUNTER — HOSPITAL ENCOUNTER (EMERGENCY)
Age: 42
Discharge: HOME | End: 2022-05-17
Attending: EMERGENCY MEDICINE
Payer: COMMERCIAL

## 2022-05-17 VITALS
SYSTOLIC BLOOD PRESSURE: 137 MMHG | RESPIRATION RATE: 18 BRPM | BODY MASS INDEX: 25.43 KG/M2 | DIASTOLIC BLOOD PRESSURE: 87 MMHG | WEIGHT: 167.77 LBS | TEMPERATURE: 98 F | HEART RATE: 85 BPM | OXYGEN SATURATION: 100 % | HEIGHT: 68 IN

## 2022-05-17 DIAGNOSIS — E10.65 UNCONTROLLED TYPE 1 DIABETES MELLITUS WITH HYPERGLYCEMIA (HCC): ICD-10-CM

## 2022-05-17 DIAGNOSIS — N48.89 PENILE SWELLING: ICD-10-CM

## 2022-05-17 DIAGNOSIS — N48.1 BALANITIS: Primary | ICD-10-CM

## 2022-05-17 DIAGNOSIS — B37.42 CANDIDIASIS OF PENIS: ICD-10-CM

## 2022-05-17 LAB
ALBUMIN SERPL-MCNC: 1.4 G/DL (ref 3.5–5)
ALBUMIN/GLOB SERPL: 0.3 {RATIO} (ref 1.1–2.2)
ALP SERPL-CCNC: 278 U/L (ref 45–117)
ALT SERPL-CCNC: 63 U/L (ref 12–78)
ANION GAP SERPL CALC-SCNC: 6 MMOL/L (ref 5–15)
APPEARANCE UR: CLEAR
AST SERPL-CCNC: 140 U/L (ref 15–37)
BACTERIA URNS QL MICRO: NEGATIVE /HPF
BASE DEFICIT BLD-SCNC: 3.2 MMOL/L
BASOPHILS # BLD: 0 K/UL (ref 0–0.1)
BASOPHILS NFR BLD: 0 % (ref 0–1)
BILIRUB SERPL-MCNC: 0.2 MG/DL (ref 0.2–1)
BILIRUB UR QL: NEGATIVE
BUN SERPL-MCNC: 36 MG/DL (ref 6–20)
BUN/CREAT SERPL: 14 (ref 12–20)
CA-I BLD-MCNC: 1.23 MMOL/L (ref 1.12–1.32)
CALCIUM SERPL-MCNC: 8.7 MG/DL (ref 8.5–10.1)
CHLORIDE BLD-SCNC: 104 MMOL/L (ref 100–108)
CHLORIDE SERPL-SCNC: 106 MMOL/L (ref 97–108)
CO2 BLD-SCNC: 24 MMOL/L (ref 19–24)
CO2 SERPL-SCNC: 23 MMOL/L (ref 21–32)
COLOR UR: ABNORMAL
CREAT SERPL-MCNC: 2.55 MG/DL (ref 0.7–1.3)
CREAT UR-MCNC: 2.5 MG/DL (ref 0.6–1.3)
DIFFERENTIAL METHOD BLD: ABNORMAL
EOSINOPHIL # BLD: 0.4 K/UL (ref 0–0.4)
EOSINOPHIL NFR BLD: 4 % (ref 0–7)
EPITH CASTS URNS QL MICRO: ABNORMAL /LPF
ERYTHROCYTE [DISTWIDTH] IN BLOOD BY AUTOMATED COUNT: 13.2 % (ref 11.5–14.5)
GLOBULIN SER CALC-MCNC: 4.6 G/DL (ref 2–4)
GLUCOSE BLD STRIP.AUTO-MCNC: 140 MG/DL (ref 74–106)
GLUCOSE SERPL-MCNC: 145 MG/DL (ref 65–100)
GLUCOSE UR STRIP.AUTO-MCNC: >1000 MG/DL
HCO3 BLDA-SCNC: 23 MMOL/L
HCT VFR BLD AUTO: 24.4 % (ref 36.6–50.3)
HGB BLD-MCNC: 8.4 G/DL (ref 12.1–17)
HGB UR QL STRIP: ABNORMAL
IMM GRANULOCYTES # BLD AUTO: 0.2 K/UL (ref 0–0.04)
IMM GRANULOCYTES NFR BLD AUTO: 2 % (ref 0–0.5)
KETONES UR QL STRIP.AUTO: NEGATIVE MG/DL
LACTATE BLD-SCNC: 0.91 MMOL/L (ref 0.4–2)
LEUKOCYTE ESTERASE UR QL STRIP.AUTO: ABNORMAL
LYMPHOCYTES # BLD: 2.1 K/UL (ref 0.8–3.5)
LYMPHOCYTES NFR BLD: 21 % (ref 12–49)
MCH RBC QN AUTO: 30 PG (ref 26–34)
MCHC RBC AUTO-ENTMCNC: 34.4 G/DL (ref 30–36.5)
MCV RBC AUTO: 87.1 FL (ref 80–99)
MONOCYTES # BLD: 1.1 K/UL (ref 0–1)
MONOCYTES NFR BLD: 11 % (ref 5–13)
NEUTS SEG # BLD: 6 K/UL (ref 1.8–8)
NEUTS SEG NFR BLD: 62 % (ref 32–75)
NITRITE UR QL STRIP.AUTO: NEGATIVE
NRBC # BLD: 0 K/UL (ref 0–0.01)
NRBC BLD-RTO: 0 PER 100 WBC
PCO2 BLDV: 42.3 MMHG (ref 41–51)
PH BLDV: 7.34 [PH] (ref 7.32–7.42)
PH UR STRIP: 7 [PH] (ref 5–8)
PLATELET # BLD AUTO: 448 K/UL (ref 150–400)
PMV BLD AUTO: 10.2 FL (ref 8.9–12.9)
PO2 BLDV: 36 MMHG (ref 25–40)
POTASSIUM BLD-SCNC: 3.4 MMOL/L (ref 3.5–5.5)
POTASSIUM SERPL-SCNC: 3.9 MMOL/L (ref 3.5–5.1)
PROT SERPL-MCNC: 6 G/DL (ref 6.4–8.2)
PROT UR STRIP-MCNC: >300 MG/DL
RBC # BLD AUTO: 2.8 M/UL (ref 4.1–5.7)
RBC #/AREA URNS HPF: ABNORMAL /HPF (ref 0–5)
RBC MORPH BLD: ABNORMAL
SODIUM BLD-SCNC: 140 MMOL/L (ref 136–145)
SODIUM SERPL-SCNC: 135 MMOL/L (ref 136–145)
SP GR UR REFRACTOMETRY: 1.02
SPECIMEN SITE: ABNORMAL
UA: UC IF INDICATED,UAUC: ABNORMAL
UROBILINOGEN UR QL STRIP.AUTO: 0.2 EU/DL (ref 0.2–1)
WBC # BLD AUTO: 9.8 K/UL (ref 4.1–11.1)
WBC URNS QL MICRO: ABNORMAL /HPF (ref 0–4)

## 2022-05-17 PROCEDURE — 99284 EMERGENCY DEPT VISIT MOD MDM: CPT

## 2022-05-17 PROCEDURE — 96374 THER/PROPH/DIAG INJ IV PUSH: CPT

## 2022-05-17 PROCEDURE — 36415 COLL VENOUS BLD VENIPUNCTURE: CPT

## 2022-05-17 PROCEDURE — 87086 URINE CULTURE/COLONY COUNT: CPT

## 2022-05-17 PROCEDURE — 51798 US URINE CAPACITY MEASURE: CPT

## 2022-05-17 PROCEDURE — 81001 URINALYSIS AUTO W/SCOPE: CPT

## 2022-05-17 PROCEDURE — 74011250636 HC RX REV CODE- 250/636: Performed by: EMERGENCY MEDICINE

## 2022-05-17 PROCEDURE — 80053 COMPREHEN METABOLIC PANEL: CPT

## 2022-05-17 PROCEDURE — 85025 COMPLETE CBC W/AUTO DIFF WBC: CPT

## 2022-05-17 PROCEDURE — 82947 ASSAY GLUCOSE BLOOD QUANT: CPT

## 2022-05-17 RX ORDER — MORPHINE SULFATE 2 MG/ML
4 INJECTION, SOLUTION INTRAMUSCULAR; INTRAVENOUS
Status: COMPLETED | OUTPATIENT
Start: 2022-05-17 | End: 2022-05-17

## 2022-05-17 RX ORDER — MUPIROCIN 20 MG/G
OINTMENT TOPICAL DAILY
Qty: 22 G | Refills: 0 | Status: SHIPPED | OUTPATIENT
Start: 2022-05-17 | End: 2022-07-12

## 2022-05-17 RX ORDER — CHLORPHENIRAMINE MALEATE 4 MG
TABLET ORAL 2 TIMES DAILY
Qty: 113 G | Refills: 0 | Status: SHIPPED | OUTPATIENT
Start: 2022-05-17 | End: 2022-06-16

## 2022-05-17 RX ADMIN — MORPHINE SULFATE 4 MG: 2 INJECTION, SOLUTION INTRAMUSCULAR; INTRAVENOUS at 03:08

## 2022-05-17 NOTE — DISCHARGE INSTRUCTIONS
You were seen in the ED for swelling of your penis. We obtained labs, urine samples, all of which did not show acute changes. These continue your antibiotics, we are writing a prescription for topical medicines to use as well for a possible superficial infection. Please follow-up with your urologist in the next 1 to 2 weeks for reevaluation. Please return to the ED if your symptoms worsen or you are unable to urinate.

## 2022-05-17 NOTE — ED PROVIDER NOTES
EMERGENCY DEPARTMENT HISTORY AND PHYSICAL EXAM          Date: 5/17/2022  Patient Name: Andressa Wright  Attending of Record: Dami Winkler MD    History of Presenting Illness     Chief Complaint   Patient presents with    Penis Swelling     pt reports blister and swelling to right side of penis and right testicle    Urinary Retention     pt reports he had his catheter removed yesterday morning and has not voided since 6pm       History Provided By: Patient and Patient's Mother    HPI: Andressa Wright is a 46 y.o. male with history of diabetes, bipolar type I, recent hospitalization 5/8-5/14 with DKA, emphysematous cystitis with sepsis, acute urinary retention requiring Beard placement, FELECIA who presents to the ED with penile swelling and pain. Patient reports that he had his catheter removed yesterday morning, he was initially having difficulty voiding afterward, he states that when he went to urinate he noticed swelling described as a \"blister\" to the ventral aspect of his penis. He states he was initially unable to urinate and was having discomfort with trying to urinate due to the swelling, however on arrival here he was able to urinate without difficulty. He denies any recent dysuria or hematuria, fevers or chills, nausea or vomiting, abdominal pain. Of note he is circumcised. PCP: Ricky Palm MD    There are no other complaints, changes, or physical findings at this time. Current Outpatient Medications   Medication Sig Dispense Refill    clotrimazole (LOTRIMIN) 1 % topical cream Apply  to affected area two (2) times a day for 30 days. Apply twice a day for 2-4 weeks. Apply until symptoms resolve. 113 g 0    mupirocin (BACTROBAN) 2 % ointment Apply  to affected area daily. Apply until symptoms resolve 22 g 0    levoFLOXacin (LEVAQUIN) 750 mg tablet Take 1 Tablet by mouth daily for 7 days. 8 Tablet 0    insulin glargine (LANTUS) 100 unit/mL injection 16 Units by SubCUTAneous route daily.  1 mL 0    tamsulosin (FLOMAX) 0.4 mg capsule Take 0.4 mg by mouth daily.  insulin aspart U-100 (NovoLOG Flexpen U-100 Insulin) 100 unit/mL (3 mL) inpn 2 Units by SubCUTAneous route Before breakfast, lunch, and dinner. 10 Adjustable Dose Pre-filled Pen Syringe 3    naloxone (Narcan) 4 mg/actuation nasal spray Use 1 spray intranasally, then discard. Repeat with new spray every 2 min as needed for opioid overdose symptoms, alternating nostrils. 1 Each 0    pen needle, diabetic 30 gauge x 3/16\" ndle 5 Units by Does Not Apply route Before breakfast, lunch, and dinner. 100 Each 3    amLODIPine (NORVASC) 5 mg tablet Take 1 Tablet by mouth daily. 30 Tablet 1    carvediloL (COREG) 12.5 mg tablet Take 1 Tablet by mouth two (2) times daily (with meals). 60 Tablet 1    finasteride (PROSCAR) 5 mg tablet Take 1 Tablet by mouth daily. 30 Tablet 2    rosuvastatin (CRESTOR) 40 mg tablet Take 1 Tablet by mouth nightly. 30 Tablet 0    ondansetron (ZOFRAN ODT) 4 mg disintegrating tablet Take 1 Tablet by mouth every eight (8) hours as needed for Nausea or Vomiting. 20 Tablet 0    DULoxetine (CYMBALTA) 60 mg capsule Take 1 Capsule by mouth daily. 30 Capsule 3    pantoprazole (Protonix) 40 mg tablet Take 1 Tablet by mouth daily. 30 Tablet 0    ergocalciferol (ERGOCALCIFEROL) 1,250 mcg (50,000 unit) capsule Take 1 capsule by mouth once a week (Patient not taking: Reported on 5/10/2022) 12 Capsule 0    pregabalin (Lyrica) 75 mg capsule Take 75 mg by mouth two (2) times a day.          Past History     Past Medical History:  Past Medical History:   Diagnosis Date    B[facility] 1 disorder[facility] depressed [facility] [facility]     Bipolar disorder ([facility]     Depression     Diabetes ([facility] [facility]     DKA, type 1 ([facility] [address] 1/27/2013    diagnosed age 23    H/O noncompliance with medical treatment, presenting hazards to health     MRSA (methicillin resistant staph aureus) culture positive     MRSA (methicillin resistant Staphylococcus aureus)     Face    Noncompliance with medication regimen     Second hand smoke exposure     Se[facility] [facility]     Seizures ([facility] [address] 2006 or 2007    one episode during care home       Past Surgical History:  Past Surgical History:   Procedure Laterality Date    HX HEENT      top left wisdom tooth    HX ORTHOPAEDIC Left     wrist; MCV    UPPER GI ENDOSCOPY,BIOPSY  2018            Family History:  Family History   Problem Relation Age of Onset    Diabetes Mother     Diabetes Other         neice, type 1        Social History:  Social History     Tobacco Use    Smoking status: Former Smoker     Packs/day: 0.10     Years: 16.00     Pack years: 1.60     Types: Cigarettes     Start date: 10/4/1999     Quit date: 2020     Years since quittin.2    Smokeless tobacco: Never Used   Substance Use Topics    Alcohol use: No    Drug use: No       Allergies:  No Known Allergies      Review of Systems   Review of Systems   Constitutional: Negative for chills and fever. HENT: Negative for rhinorrhea and sore throat. Respiratory: Negative for shortness of breath. Cardiovascular: Negative for chest pain. Gastrointestinal: Negative for abdominal pain, nausea and vomiting. Genitourinary: Positive for penile swelling. Negative for dysuria, hematuria and testicular pain. Musculoskeletal: Negative for myalgias. Skin: Negative for rash. Neurological: Negative for headaches. Hematological: Does not bruise/bleed easily. Physical Exam   Physical Exam  Vitals and nursing note reviewed. Constitutional:       Appearance: Normal appearance. He is well-developed. HENT:      Head: Normocephalic and atraumatic. Cardiovascular:      Rate and Rhythm: Normal rate and regular rhythm. Pulses:           Radial pulses are 2+ on the right side and 2+ on the left side. Pulmonary:      Effort: Pulmonary effort is normal. No accessory muscle usage. Chest:      Chest wall: No deformity.    Abdominal:      Palpations: Abdomen is soft.      Tenderness: There is no abdominal tenderness. Genitourinary:     Comments: 3 cm area of edema that is 60% circumferential on the ventral aspect of the distal penis, just proximal to the glans, mildly erythematous, circumcised, no other erythema or palpable gas locules, no rash noted in the perineum  Musculoskeletal:      Right lower leg: No edema. Left lower leg: No edema. Skin:     General: Skin is warm and dry. Neurological:      General: No focal deficit present. Mental Status: He is alert and oriented to person, place, and time.    Psychiatric:         Mood and Affect: Mood normal.         Behavior: Behavior normal.         Diagnostic Study Results     Labs -     Recent Results (from the past 12 hour(s))   URINALYSIS W/ REFLEX CULTURE    Collection Time: 05/17/22  2:22 AM    Specimen: Urine   Result Value Ref Range    Color YELLOW/STRAW      Appearance CLEAR CLEAR      Specific gravity 1.018      pH (UA) 7.0 5.0 - 8.0      Protein >300 (A) NEG mg/dL    Glucose >1,000 (A) NEG mg/dL    Ketone Negative NEG mg/dL    Bilirubin Negative NEG      Blood MODERATE (A) NEG      Urobilinogen 0.2 0.2 - 1.0 EU/dL    Nitrites Negative NEG      Leukocyte Esterase TRACE (A) NEG      WBC  0 - 4 /hpf    RBC 10-20 0 - 5 /hpf    Epithelial cells FEW FEW /lpf    Bacteria Negative NEG /hpf    UA:UC IF INDICATED URINE CULTURE ORDERED (A) CNI     CBC WITH AUTOMATED DIFF    Collection Time: 05/17/22  2:48 AM   Result Value Ref Range    WBC 9.8 4.1 - 11.1 K/uL    RBC 2.80 (L) 4.10 - 5.70 M/uL    HGB 8.4 (L) 12.1 - 17.0 g/dL    HCT 24.4 (L) 36.6 - 50.3 %    MCV 87.1 80.0 - 99.0 FL    MCH 30.0 26.0 - 34.0 PG    MCHC 34.4 30.0 - 36.5 g/dL    RDW 13.2 11.5 - 14.5 %    PLATELET 248 (H) 804 - 400 K/uL    MPV 10.2 8.9 - 12.9 FL    NRBC 0.0 0  WBC    ABSOLUTE NRBC 0.00 0.00 - 0.01 K/uL    NEUTROPHILS 62 32 - 75 %    LYMPHOCYTES 21 12 - 49 %    MONOCYTES 11 5 - 13 %    EOSINOPHILS 4 0 - 7 %    BASOPHILS 0 0 - 1 %    IMMATURE GRANULOCYTES 2 (H) 0.0 - 0.5 %    ABS. NEUTROPHILS 6.0 1.8 - 8.0 K/UL    ABS. LYMPHOCYTES 2.1 0.8 - 3.5 K/UL    ABS. MONOCYTES 1.1 (H) 0.0 - 1.0 K/UL    ABS. EOSINOPHILS 0.4 0.0 - 0.4 K/UL    ABS. BASOPHILS 0.0 0.0 - 0.1 K/UL    ABS. IMM. GRANS. 0.2 (H) 0.00 - 0.04 K/UL    DF SMEAR SCANNED      RBC COMMENTS NORMOCYTIC, NORMOCHROMIC     METABOLIC PANEL, COMPREHENSIVE    Collection Time: 05/17/22  2:48 AM   Result Value Ref Range    Sodium 135 (L) 136 - 145 mmol/L    Potassium 3.9 3.5 - 5.1 mmol/L    Chloride 106 97 - 108 mmol/L    CO2 23 21 - 32 mmol/L    Anion gap 6 5 - 15 mmol/L    Glucose 145 (H) 65 - 100 mg/dL    BUN 36 (H) 6 - 20 MG/DL    Creatinine 2.55 (H) 0.70 - 1.30 MG/DL    BUN/Creatinine ratio 14 12 - 20      GFR est AA 34 (L) >60 ml/min/1.73m2    GFR est non-AA 28 (L) >60 ml/min/1.73m2    Calcium 8.7 8.5 - 10.1 MG/DL    Bilirubin, total 0.2 0.2 - 1.0 MG/DL    ALT (SGPT) 63 12 - 78 U/L    AST (SGOT) 140 (H) 15 - 37 U/L    Alk.  phosphatase 278 (H) 45 - 117 U/L    Protein, total 6.0 (L) 6.4 - 8.2 g/dL    Albumin 1.4 (L) 3.5 - 5.0 g/dL    Globulin 4.6 (H) 2.0 - 4.0 g/dL    A-G Ratio 0.3 (L) 1.1 - 2.2     BLOOD GAS,CHEM8,LACTIC ACID POC    Collection Time: 05/17/22  3:18 AM   Result Value Ref Range    Calcium, ionized (POC) 1.23 1.12 - 1.32 mmol/L    BICARBONATE 23 mmol/L    Base deficit (POC) 3.2 mmol/L    Sample source VENOUS BLOOD      CO2, POC 24 19 - 24 MMOL/L    Sodium,  136 - 145 MMOL/L    Potassium, POC 3.4 (L) 3.5 - 5.5 MMOL/L    Chloride,  100 - 108 MMOL/L    Glucose,  (H) 74 - 106 MG/DL    Creatinine, POC 2.5 (H) 0.6 - 1.3 MG/DL    Lactic Acid (POC) 0.91 0.40 - 2.00 mmol/L    pH, venous (POC) 7.34 7.32 - 7.42      pCO2, venous (POC) 42.3 41 - 51 MMHG    pO2, venous (POC) 36 25 - 40 mmHg       Radiologic Studies -   No orders to display     CT Results  (Last 48 hours)    None        CXR Results  (Last 48 hours)    None            Medical Decision Making   I am the first provider for this patient. I reviewed the vital signs, available nursing notes, past medical history, past surgical history, family history and social history. Vital Signs-Reviewed the patient's vital signs. Patient Vitals for the past 12 hrs:   Temp Pulse Resp BP SpO2   05/17/22 0134 98 °F (36.7 °C) 85 18 137/87 100 %     Pulse Oximetry Analysis: 100% on RA    Cardiac Monitor:   Rate: 85 bpm  The cardiac monitor revealed the following rhythm as interpreted by me: Normal Sinus Rhythm  Cardiac monitoring was ordered to monitor patient for signs of cardiac dysrhythmia, which they are at risk for based on their history and/or risk for cardiovascular disease and/or metabolic abnormalities. Records Reviewed: Nursing Notes and Old Medical Records    Provider Notes (Medical Decision Making):   DDx: balanitis, paraphimosis, cellulitis  42-year-old male with history of recent hospitalization in the setting of DKA and emphysematous cystitis, Beard placement for urinary obstruction which was removed yesterday, presents with acute penile swelling and discomfort. He was able to urinate here in the department. On exam, afebrile, no acute distress, 3 cm area of edema noted to the ventral aspect of the penis which is 60% circumferential, he is circumcised. Initial concern for paraphimosis though patient is circumcised and his swelling is not circumferential so this is much less likely. This may be a localized balanitis. Unlikely to be Luz Marina's, no palpable crepitus, not febrile or otherwise showing signs of systemic illness. Will obtain lab work including UA, CBC, CMP, lactate, VBG to assess for signs of systemic infection. He is able to urinate, no emergent need for urology at this time. Will apply ice to help with edema. Pending lab work, if unremarkable will likely discharge with outpatient urology follow-up. ED Course and Progress Notes:   Initial assessment performed.  The patients presenting problems have been discussed, and they are in agreement with the care plan formulated and outlined with them. I have encouraged them to ask questions as they arise throughout their visit. ED Course as of 05/17/22 0357   Tue May 17, 2022   0630 Lab work showing stable kidney function, urinalysis with  WBCs, he is already on systemic antibiotics with levofloxacin, no leukocytosis. On reassessment, his symptoms are improved and his swelling has decreased. At this point, we will treat as possible balanitis with clotrimazole and mupirocin. I instructed him to closely follow-up with urology which he was understanding of.       ED Course User Index     Orders Placed This Encounter    CULTURE, URINE    URINALYSIS W/ REFLEX CULTURE    CBC WITH AUTOMATED DIFF    COMPREHENSIVE METABOLIC PANEL    VENOUS BLOOD GAS    BLOOD GAS,CHEM8,LACTIC ACID POC    morphine injection 4 mg    clotrimazole (LOTRIMIN) 1 % topical cream    mupirocin (BACTROBAN) 2 % ointment     Medications   morphine injection 4 mg (4 mg IntraVENous Given 5/17/22 0308)     Progress Note:  Pt states he feels much better after ED treatment; pt able to tolerate PO and ambulate per baseline. Pt is clinically safe for discharge. Sharyle Pol Eicher's final labs and imaging have been reviewed with him. He has been counseled regarding his diagnosis. He verbally conveys understanding and agreement of the signs, symptoms, diagnosis, treatment and prognosis and additionally agrees to follow up as recommended with Dr. Zulema Cee MD in 24 - 48 hours. All questions have been answered, pt voiced understanding and agreement with plan. Specific return precautions provided as well as instructions to return to the ED should sx worsen at any time. At time of discharge, pt had stable vital signs and had no questions or concerns, and was very satisfied with overall care. DISCHARGE  The pt is ready for discharge.  The pt's signs, symptoms, diagnosis, and discharge instructions have been discussed and pt has conveyed their understanding. The pt is to follow up as recommended or return to ER should their symptoms worsen. Plan has been discussed and pt is in agreement. Diagnosis     Clinical Impression:   1. Balanitis    2. Uncontrolled type 1 diabetes mellitus with hyperglycemia ([address]    3. Penile swelling    4. Candidiasis of penis        Disposition:  Home    DISCHARGE PLAN:  1. Current Discharge Medication List      START taking these medications    Details   clotrimazole (LOTRIMIN) 1 % topical cream Apply  to affected area two (2) times a day for 30 days. Apply twice a day for 2-4 weeks. Apply until symptoms resolve. Qty: 113 g, Refills: 0  Start date: 5/17/2022, End date: 6/16/2022      mupirocin (BACTROBAN) 2 % ointment Apply  to affected area daily. Apply until symptoms resolve  Qty: 22 g, Refills: 0  Start date: 5/17/2022           2. Follow-up Information     Follow up With Specialties Details Why [address] [facility] U[facility]    [facility] [address]   [phone] [facility] EMERGENCY DEPT Emergency Medicine  As needed, if unable to urinate or worsening symptoms [address]  [address] [facility]  [phone]        3. Return to ED if worse       I personally performed the services described in this documentation on this date 5/17/2022 for [employer]. I have reviewed and verified that all the information is accurate and complete.  Alanna Michael MD

## 2022-05-18 LAB
BACTERIA SPEC CULT: NORMAL
CC UR VC: NORMAL
SERVICE CMNT-IMP: NORMAL

## 2022-05-19 ENCOUNTER — OFFICE VISIT (OUTPATIENT)
Dept: PRIMARY CARE CLINIC | Age: 42
End: 2022-05-19
Payer: COMMERCIAL

## 2022-05-19 VITALS
HEIGHT: 68 IN | SYSTOLIC BLOOD PRESSURE: 166 MMHG | WEIGHT: 170 LBS | HEART RATE: 88 BPM | RESPIRATION RATE: 18 BRPM | BODY MASS INDEX: 25.76 KG/M2 | OXYGEN SATURATION: 100 % | DIASTOLIC BLOOD PRESSURE: 80 MMHG

## 2022-05-19 DIAGNOSIS — E78.00 HYPERCHOLESTEREMIA: ICD-10-CM

## 2022-05-19 DIAGNOSIS — E78.5 HYPERLIPIDEMIA, UNSPECIFIED HYPERLIPIDEMIA TYPE: ICD-10-CM

## 2022-05-19 DIAGNOSIS — E83.42 HYPOMAGNESEMIA: ICD-10-CM

## 2022-05-19 DIAGNOSIS — N18.30 STAGE 3 CHRONIC KIDNEY DISEASE, UNSPECIFIED WHETHER STAGE 3A OR 3B CKD (HCC): ICD-10-CM

## 2022-05-19 DIAGNOSIS — M25.551 RIGHT HIP PAIN: ICD-10-CM

## 2022-05-19 DIAGNOSIS — R80.9 PROTEINURIA, UNSPECIFIED TYPE: ICD-10-CM

## 2022-05-19 DIAGNOSIS — M79.89 LEG SWELLING: ICD-10-CM

## 2022-05-19 DIAGNOSIS — G89.29 CHRONIC MIDLINE LOW BACK PAIN WITHOUT SCIATICA: ICD-10-CM

## 2022-05-19 DIAGNOSIS — M54.50 CHRONIC MIDLINE LOW BACK PAIN WITHOUT SCIATICA: ICD-10-CM

## 2022-05-19 DIAGNOSIS — I10 ESSENTIAL HYPERTENSION: ICD-10-CM

## 2022-05-19 DIAGNOSIS — E11.9 DM TYPE 2, GOAL HBA1C < 7% (HCC): ICD-10-CM

## 2022-05-19 DIAGNOSIS — R79.89 ELEVATED TSH: Primary | ICD-10-CM

## 2022-05-19 PROCEDURE — 99215 OFFICE O/P EST HI 40 MIN: CPT | Performed by: INTERNAL MEDICINE

## 2022-05-19 RX ORDER — AMLODIPINE BESYLATE 5 MG/1
5 TABLET ORAL DAILY
Qty: 30 TABLET | Refills: 1 | Status: SHIPPED | OUTPATIENT
Start: 2022-05-19 | End: 2022-07-14 | Stop reason: DRUGHIGH

## 2022-05-19 RX ORDER — ROSUVASTATIN CALCIUM 40 MG/1
40 TABLET, COATED ORAL
Qty: 30 TABLET | Refills: 0 | Status: SHIPPED | OUTPATIENT
Start: 2022-05-19 | End: 2022-06-02 | Stop reason: DRUGHIGH

## 2022-05-19 RX ORDER — CARVEDILOL 12.5 MG/1
12.5 TABLET ORAL 2 TIMES DAILY WITH MEALS
Qty: 60 TABLET | Refills: 1 | Status: SHIPPED | OUTPATIENT
Start: 2022-05-19

## 2022-05-19 RX ORDER — FLUCONAZOLE 200 MG/1
200 TABLET ORAL DAILY
Qty: 5 TABLET | Refills: 0 | Status: SHIPPED | OUTPATIENT
Start: 2022-05-19 | End: 2022-05-24

## 2022-05-19 RX ORDER — FUROSEMIDE 20 MG/1
20 TABLET ORAL DAILY
Qty: 30 TABLET | Refills: 1 | Status: SHIPPED | OUTPATIENT
Start: 2022-05-19 | End: 2022-06-09

## 2022-05-19 RX ORDER — OXYCODONE HYDROCHLORIDE 5 MG/1
5 TABLET ORAL
Qty: 20 TABLET | Refills: 0 | Status: SHIPPED | OUTPATIENT
Start: 2022-05-19 | End: 2022-06-02 | Stop reason: SDUPTHER

## 2022-05-19 NOTE — PROGRESS NOTES
Chief Complaint   Patient presents with   [address] 5/17/2022        Visit Vitals  BP (!) 166/80 (BP 1 Location: Right arm, BP Patient Position: Sitting)   Pulse 88   Resp 18   Ht 5' 8\" (1.727 m)   Wt 170 lb (77.1 kg)   SpO2 100%   BMI 25.85 kg/m²        Health Maintenance Due   Topic    Pneumococcal 0-64 years (2 - PCV)    Foot Exam Q1     COVID-19 Vaccine (3 - Booster for Moderna series)    MICROALBUMIN Q1         1. Have you been to the ER, urgent care clinic since your last visit? Hospitalized since your last visit? Yes When: [facility] 5/17/2022 Where: [facility] 5/17/2022    2. Have you seen or consulted any other health care providers outside of the [address] [place] [address] since your last visit? Include any pap smears or colon screening.  No

## 2022-05-19 NOTE — PROGRESS NOTES
Jb Ontiveros is a 46 y.o.  male and presents with     Chief Complaint   Patient presents with   [address] 5/17/2022    Leg Swelling     patient fell on 5/17/2022    Transition of care note      Admit date: 5/8/2022  Discharge date and time: 5/14/2022  Symptoms on admission-altered mental status  Discharge diagnoses-DKA, emphysematous cystitis. Pt is here for post hopsital follow up. Was admitted to hospital in March and more recentyl this month. Pt has  bipolar disorder, HTN, DM1 and  Presented  to [facility] ICU for AMS.  Upon arrival HDS, lethargic and disoriented,  Was opening opening eyes to voice and follows commands.  Lab work done and significant for pH 6.95 with a bicarb of 3. Glucose 1335,  (corrected 136), K 7.3, Cr 3.76, WBC 16.8.  Initially temp unable to be obtained, core temp taken and showed a temp of 88.9 deg F.  Placed on warming blanket.  Given 2 L IVF, calcium, and started on an insulin gtt.  ICU consulted for admission.   Pt had lower abdomen distention, rigidity and tenderness.  Bladder scan done and showed >999.  Hansen placed. Will get CT abd to r/o intra abdominal source of infection.  Pt was  admitted to the ICU for further management.     Pt says he is in lot of pain in his back. MRI was rescheduled. Penis feels swollen. HAd hansen catheter. Saw Urology and catheter was removed. Pt went back to ER and then saw Urology again and was given a cream.  Has appt with Endocrinology in June. Also saw Neurology. Pt had UTI and urianry retention. Uology did not give a reason, . Pt is on flomax  Has lot of swelling in his legs. Pt has neuropathy in feet. Was given Lyrica by Neurology  Pt is doing sliding scal NOvolog in  Addition to Lantus. Pt had treatement on his eye for DM retinopathy.       Past Medical History:   Diagnosis Date    Bipolar 1 disorder, depressed ([address]     Bipolar disorder ([address]     Depression     Diabetes ([address]     DKA, type 1 ([address] 1/27/2013    diagnosed age 23    H/O noncompliance with medical treatment, presenting hazards to health     MRSA (methicillin resistant staph aureus) culture positive     MRSA (methicillin resistant Staphylococcus aureus)     Face    Noncompliance with medication regimen     Second hand smoke exposure     Seizure ([address]     Seizures ([facility] 2006 or 2007    one episode during prison     Past Surgical History:   Procedure Laterality Date    HX HEENT      top left wisdom tooth    HX ORTHOPAEDIC Left     wrist; MCV    UPPER GI ENDOSCOPY,BIOPSY  11/20/2018          Current Outpatient Medications   Medication Sig    rosuvastatin (CRESTOR) 40 mg tablet Take 1 Tablet by mouth nightly.  amLODIPine (NORVASC) 5 mg tablet Take 1 Tablet by mouth daily.  carvediloL (COREG) 12.5 mg tablet Take 1 Tablet by mouth two (2) times daily (with meals).  fluconazole (DIFLUCAN) 200 mg tablet Take 1 Tablet by mouth daily for 5 days. FDA advises cautious prescribing of oral fluconazole in pregnancy.  furosemide (LASIX) 20 mg tablet Take 1 Tablet by mouth daily.  oxyCODONE IR (Roxicodone) 5 mg immediate release tablet Take 1 Tablet by mouth every eight (8) hours as needed for Pain for up to 30 days. Max Daily Amount: 15 mg.  clotrimazole (LOTRIMIN) 1 % topical cream Apply  to affected area two (2) times a day for 30 days. Apply twice a day for 2-4 weeks. Apply until symptoms resolve.  mupirocin (BACTROBAN) 2 % ointment Apply  to affected area daily. Apply until symptoms resolve    levoFLOXacin (LEVAQUIN) 750 mg tablet Take 1 Tablet by mouth daily for 7 days.  insulin glargine (LANTUS) 100 unit/mL injection 16 Units by SubCUTAneous route daily.  tamsulosin (FLOMAX) 0.4 mg capsule Take 0.4 mg by mouth daily.  insulin aspart U-100 (NovoLOG Flexpen U-100 Insulin) 100 unit/mL (3 mL) inpn 2 Units by SubCUTAneous route Before breakfast, lunch, and dinner.     naloxone (Narcan) 4 mg/actuation nasal spray Use 1 spray intranasally, then discard. Repeat with new spray every 2 min as needed for opioid overdose symptoms, alternating nostrils.  pen needle, diabetic 30 gauge x 3/16\" ndle 5 Units by Does Not Apply route Before breakfast, lunch, and dinner.  finasteride (PROSCAR) 5 mg tablet Take 1 Tablet by mouth daily.  ondansetron (ZOFRAN ODT) 4 mg disintegrating tablet Take 1 Tablet by mouth every eight (8) hours as needed for Nausea or Vomiting.  DULoxetine (CYMBALTA) 60 mg capsule Take 1 Capsule by mouth daily.  pantoprazole (Protonix) 40 mg tablet Take 1 Tablet by mouth daily.  pregabalin (Lyrica) 75 mg capsule Take 75 mg by mouth two (2) times a day.  ergocalciferol (ERGOCALCIFEROL) 1,250 mcg (50,000 unit) capsule Take 1 capsule by mouth once a week (Patient not taking: Reported on 5/10/2022)     No current facility-administered medications for this visit. Health Maintenance   Topic Date Due    Pneumococcal 0-64 years (2 - PCV) 08/18/2012    Foot Exam Q1  03/07/2021    COVID-19 Vaccine (3 - Booster for Moderna series) 03/13/2022    MICROALBUMIN Q1  04/01/2022    A1C test (Diabetic or Prediabetic)  06/08/2022    Eye Exam Retinal or Dilated  06/18/2022    Flu Vaccine (Season Ended) 09/01/2022    Depression Screen  02/24/2023    Lipid Screen  04/01/2023    DTaP/Tdap/Td series (2 - Td or Tdap) 03/09/2028    Hepatitis C Screening  Completed     Immunization History   Administered Date(s) Administered    (RETIRED) Pneumococcal Vaccine (Unspecified Type) 08/18/2011    COVID-19, [place], Primary or Immunocompromised Series, MRNA, PF, 100mcg/0.5mL 09/11/2021, 10/13/2021    MMR 03/23/1995    Pneumococcal Polysaccharide (PPSV-23) 08/18/2011    Rabies Vaccine IM 09/17/1992    TD Vaccine 03/08/2011    Tdap 03/09/2018     No LMP for male patient.         Allergies and Intolerances:   No Known Allergies    Family History:   Family History   Problem Relation Age of Onset    Diabetes Mother    [facility] Diabetes Other         neice, type 1        Social History:   He  reports that he quit smoking about 2 years ago. His smoking use included cigarettes. He started smoking about 22 years ago. He has a 1.60 pack-year smoking history. He has never used smokeless tobacco.  He  reports no history of alcohol use. Review of Systems: Positive symptoms as listed above  General: negative for - chills, fatigue, fever, weight change  Psych: negative for - anxiety, depression, irritability or mood swings  ENT: negative for - headaches, hearing change, nasal congestion, oral lesions, sneezing or sore throat  Heme/ Lymph: negative for - bleeding problems, bruising, pallor or swollen lymph nodes  Endo: negative for - hot flashes, polydipsia/polyuria or temperature intolerance  Resp: negative for - cough, shortness of breath or wheezing  CV: negative for - chest pain, edema or palpitations  GI: negative for - abdominal pain, change in bowel habits, constipation, diarrhea or nausea/vomiting  : negative for - dysuria, hematuria, incontinence, pelvic pain or vulvar/vaginal symptoms  MSK: negative for - joint pain, joint swelling or muscle pain  Neuro: negative for - confusion, headaches, seizures or weakness  Derm: negative for - dry skin, hair changes, rash or skin lesion changes          Physical:   Vitals:   Vitals:    05/19/22 1220   BP: (!) 166/80   Pulse: 88   Resp: 18   SpO2: 100%   Weight: 170 lb (77.1 kg)   Height: 5' 8\" (1.727 m)           Exam:   HEENT- atraumatic,normocephalic, awake, oriented, well nourished  Neck - supple,no enlarged lymph nodes, no JVD, no thyromegaly  Chest- CTA, no rhonchi, no crackles  Heart- rrr, no murmurs / gallop/rub  Abdomen- soft,BS+,NT, no hepatosplenomegaly  Ext - no c/c/edema, bilateral lower extremity swelling, candidal balanitis, subcutaneous fluid collection the shaft of the penis  Neuro- no focal deficits. Power 5/5 all extremities  Skin - warm,dry, no obvious vivek. Review of Data:   LABS:   Lab Results   Component Value Date/Time    WBC 9.8 05/17/2022 02:48 AM    HGB 8.4 (L) 05/17/2022 02:48 AM    HCT 24.4 (L) 05/17/2022 02:48 AM    PLATELET 549 (H) 94/57/5521 02:48 AM     Lab Results   Component Value Date/Time    Sodium 135 (L) 05/17/2022 02:48 AM    Potassium 3.9 05/17/2022 02:48 AM    Chloride 106 05/17/2022 02:48 AM    CO2 23 05/17/2022 02:48 AM    Glucose 145 (H) 05/17/2022 02:48 AM    BUN 36 (H) 05/17/2022 02:48 AM    Creatinine 2.55 (H) 05/17/2022 02:48 AM     Lab Results   Component Value Date/Time    Cholesterol, total 192 11/15/2021 12:01 PM    HDL Cholesterol 61 11/15/2021 12:01 PM    LDL, calculated 89.4 11/15/2021 12:01 PM    Triglyceride 208 (H) 11/15/2021 12:01 PM     No components found for: GPT        Impression / Plan:        ICD-10-CM ICD-9-CM    1. Elevated TSH  R79.89 794.5 TSH 3RD GENERATION      T4, FREE   2. Hyperlipidemia, unspecified hyperlipidemia type  E78.5 272.4 rosuvastatin (CRESTOR) 40 mg tablet   3. DM type 2, goal HbA1c < 7% ([facility])  E11.9 250.00    4. Essential hypertension  I10 401.9    5. Hypercholesteremia  E78.00 272.0    6. Proteinuria, unspecified type  R80.9 791.0 PROTEIN, URINE, 24 HR   7. Chronic midline low back pain without sciatica  M54.50 724.2 MRI LUMB SPINE WO CONT    G89.29 338.29    8. Hypomagnesemia  E83.42 275.2 MAGNESIUM   9. Stage 3 chronic kidney disease, unspecified whether stage 3a or 3b CKD ([facility])  G72.15 493.5 METABOLIC PANEL, COMPREHENSIVE   10. Leg swelling  M79.89 729.81 furosemide (LASIX) 20 mg tablet   11. Right hip pain  M25.551 719.45 oxyCODONE IR (Roxicodone) 5 mg immediate release tablet     Keep appointment with endocrinologist and nephrologist as well as urologist    Balanitis, swelling elevation with wamr compresses with salt. Avoid excess fluid intake due to renal insufficiency.   Rule out significant proteinuria related to underlying diabetes  Patient does see nephrologist and gets potassium supplements. Most recent potassium levels were low. Explained to patient risk benefits of the medications. Advised patient to stop meds if having any side effects. Pt verbalized understanding of the instructions. I have discussed the diagnosis with the patient and the intended plan as seen in the above orders. The patient has received an after-visit summary and questions were answered concerning future plans. I have discussed medication side effects and warnings with the patient as well. I have reviewed the plan of care with the patient, accepted their input and they are in agreement with the treatment goals. Reviewed plan of care. Patient has provided input and agrees with goals. Follow-up and Dispositions    · Return in about 2 weeks (around 6/2/2022).          Mahnaz Garcia MD

## 2022-05-27 ENCOUNTER — HOSPITAL ENCOUNTER (OUTPATIENT)
Dept: MRI IMAGING | Age: 42
Discharge: HOME | End: 2022-05-27
Attending: INTERNAL MEDICINE
Payer: MEDICAID

## 2022-05-27 DIAGNOSIS — G89.29 CHRONIC MIDLINE LOW BACK PAIN WITHOUT SCIATICA: ICD-10-CM

## 2022-05-27 DIAGNOSIS — M54.50 CHRONIC MIDLINE LOW BACK PAIN WITHOUT SCIATICA: ICD-10-CM

## 2022-05-27 PROCEDURE — 72148 MRI LUMBAR SPINE W/O DYE: CPT

## 2022-05-31 NOTE — PROGRESS NOTES
Problem: Falls - Risk of  Goal: *Absence of Falls  Description: Document [place] Arevalo Fall Risk and appropriate interventions in the flowsheet. Outcome: Progressing Towards Goal  Note: Fall Risk Interventions:  Mobility Interventions: Bed/chair exit alarm,Patient to call before getting OOB         Medication Interventions: Bed/chair exit alarm,Patient to call before getting OOB,Teach patient to arise slowly    Elimination Interventions: Bed/chair exit alarm,Call light in reach,Patient to call for help with toileting needs,Toileting schedule/hourly rounds              Problem: Patient Education: Go to Patient Education Activity  Goal: Patient/Family Education  Outcome: Progressing Towards Goal     Problem: Diabetes Self-Management  Goal: *Disease process and treatment process  Description: Define diabetes and identify own type of diabetes; list 3 options for treating diabetes. Outcome: Progressing Towards Goal  Goal: *Incorporating nutritional management into lifestyle  Description: Describe effect of type, amount and timing of food on blood glucose; list 3 methods for planning meals. Outcome: Progressing Towards Goal  Goal: *Incorporating physical activity into lifestyle  Description: State effect of exercise on blood glucose levels. Outcome: Progressing Towards Goal  Goal: *Developing strategies to promote health/change behavior  Description: Define the ABC's of diabetes; identify appropriate screenings, schedule and personal plan for screenings. Outcome: Progressing Towards Goal  Goal: *Using medications safely  Description: State effect of diabetes medications on diabetes; name diabetes medication taking, action and side effects. Outcome: Progressing Towards Goal  Goal: *Monitoring blood glucose, interpreting and using results  Description: Identify recommended blood glucose targets  and personal targets.   Outcome: Progressing Towards Goal  Goal: *Prevention, detection, treatment of acute complications  Description: List symptoms of hyper- and hypoglycemia; describe how to treat low blood sugar and actions for lowering  high blood glucose level. Outcome: Progressing Towards Goal  Goal: *Prevention, detection and treatment of chronic complications  Description: Define the natural course of diabetes and describe the relationship of blood glucose levels to long term complications of diabetes. Outcome: Progressing Towards Goal  Goal: *Developing strategies to address psychosocial issues  Description: Describe feelings about living with diabetes; identify support needed and support network  Outcome: Progressing Towards Goal  Goal: *Insulin pump training  Outcome: Progressing Towards Goal  Goal: *Sick day guidelines  Outcome: Progressing Towards Goal  Goal: *Patient Specific Goal (EDIT GOAL, INSERT TEXT)  Outcome: Progressing Towards Goal     Problem: Patient Education: Go to Patient Education Activity  Goal: Patient/Family Education  Outcome: Progressing Towards Goal     Problem: Airway Clearance - Ineffective  Goal: Achieve or maintain patent airway  Outcome: Progressing Towards Goal     Problem: Gas Exchange - Impaired  Goal: Absence of hypoxia  Outcome: Progressing Towards Goal  Goal: Promote optimal lung function  Outcome: Progressing Towards Goal     Problem: Breathing Pattern - Ineffective  Goal: Ability to achieve and maintain a regular respiratory rate  Outcome: Progressing Towards Goal     Problem:  Body Temperature -  Risk of, Imbalanced  Goal: Ability to maintain a body temperature within defined limits  Outcome: Progressing Towards Goal  Goal: Will regain or maintain usual level of consciousness  Outcome: Progressing Towards Goal  Goal: Complications related to the disease process, condition or treatment will be avoided or minimized  Outcome: Progressing Towards Goal     Problem: Isolation Precautions - Risk of Spread of Infection  Goal: Prevent transmission of infectious organism to others  Outcome: Progressing Towards Goal     Problem: Nutrition Deficits  Goal: Optimize nutrtional status  Outcome: Progressing Towards Goal     Problem: Risk for Fluid Volume Deficit  Goal: Maintain normal heart rhythm  Outcome: Progressing Towards Goal  Goal: Maintain absence of muscle cramping  Outcome: Progressing Towards Goal  Goal: Maintain normal serum potassium, sodium, calcium, phosphorus, and pH  Outcome: Progressing Towards Goal     Problem: Loneliness or Risk for Loneliness  Goal: Demonstrate positive use of time alone when socialization is not possible  Outcome: Progressing Towards Goal     Problem: Fatigue  Goal: Verbalize increase energy and improved vitality  Outcome: Progressing Towards Goal     Problem: Patient Education: Go to Patient Education Activity  Goal: Patient/Family Education  Outcome: Progressing Towards Goal     Problem: Infection - Risk of, Urinary Catheter-Associated Urinary Tract Infection  Goal: *Absence of infection signs and symptoms  Outcome: Progressing Towards Goal     Problem: Patient Education: Go to Patient Education Activity  Goal: Patient/Family Education  Outcome: Progressing Towards Goal     Problem: Pressure Injury - Risk of  Goal: *Prevention of pressure injury  Description: Document Corey Scale and appropriate interventions in the flowsheet.   Outcome: Progressing Towards Goal     Problem: Patient Education: Go to Patient Education Activity  Goal: Patient/Family Education  Outcome: Progressing Towards Goal Yes

## 2022-06-02 ENCOUNTER — OFFICE VISIT (OUTPATIENT)
Dept: PRIMARY CARE CLINIC | Age: 42
End: 2022-06-02
Payer: COMMERCIAL

## 2022-06-02 VITALS
SYSTOLIC BLOOD PRESSURE: 102 MMHG | DIASTOLIC BLOOD PRESSURE: 64 MMHG | HEIGHT: 68 IN | TEMPERATURE: 97.3 F | BODY MASS INDEX: 24.71 KG/M2 | RESPIRATION RATE: 18 BRPM | WEIGHT: 163 LBS | OXYGEN SATURATION: 98 % | HEART RATE: 80 BPM

## 2022-06-02 DIAGNOSIS — E11.9 DM TYPE 2, GOAL HBA1C < 7% (HCC): ICD-10-CM

## 2022-06-02 DIAGNOSIS — Z12.5 PROSTATE CANCER SCREENING: ICD-10-CM

## 2022-06-02 DIAGNOSIS — G89.29 CHRONIC MIDLINE LOW BACK PAIN WITHOUT SCIATICA: ICD-10-CM

## 2022-06-02 DIAGNOSIS — D64.9 ANEMIA, UNSPECIFIED TYPE: ICD-10-CM

## 2022-06-02 DIAGNOSIS — M54.50 CHRONIC MIDLINE LOW BACK PAIN WITHOUT SCIATICA: ICD-10-CM

## 2022-06-02 DIAGNOSIS — M47.892 OTHER OSTEOARTHRITIS OF SPINE, CERVICAL REGION: ICD-10-CM

## 2022-06-02 DIAGNOSIS — R74.8 ELEVATED ALKALINE PHOSPHATASE LEVEL: ICD-10-CM

## 2022-06-02 DIAGNOSIS — N18.9 CHRONIC KIDNEY DISEASE, UNSPECIFIED CKD STAGE: ICD-10-CM

## 2022-06-02 DIAGNOSIS — E78.00 HYPERCHOLESTEREMIA: ICD-10-CM

## 2022-06-02 DIAGNOSIS — M25.551 RIGHT HIP PAIN: ICD-10-CM

## 2022-06-02 DIAGNOSIS — E78.5 HYPERLIPIDEMIA, UNSPECIFIED HYPERLIPIDEMIA TYPE: Primary | ICD-10-CM

## 2022-06-02 DIAGNOSIS — M47.816 OSTEOARTHRITIS OF LUMBAR SPINE, UNSPECIFIED SPINAL OSTEOARTHRITIS COMPLICATION STATUS: ICD-10-CM

## 2022-06-02 PROCEDURE — 3046F HEMOGLOBIN A1C LEVEL >9.0%: CPT | Performed by: INTERNAL MEDICINE

## 2022-06-02 PROCEDURE — 99214 OFFICE O/P EST MOD 30 MIN: CPT | Performed by: INTERNAL MEDICINE

## 2022-06-02 RX ORDER — ROSUVASTATIN CALCIUM 20 MG/1
20 TABLET, COATED ORAL
Qty: 90 TABLET | Refills: 0 | Status: SHIPPED | OUTPATIENT
Start: 2022-06-02 | End: 2022-08-20

## 2022-06-02 RX ORDER — OXYCODONE HYDROCHLORIDE 5 MG/1
5 TABLET ORAL
Qty: 15 TABLET | Refills: 0 | Status: SHIPPED | OUTPATIENT
Start: 2022-06-02 | End: 2022-07-02

## 2022-06-02 NOTE — PROGRESS NOTES
Amari Saucedo is a 46 y.o.  male and presents with     Chief Complaint   Patient presents with    Hypertension    Follow-up     2 week follow up     Patient wants to go over the MRI findings. It was originally ordered by neurologist.  Patient has diabetes and was felt to have neuropathy. Patient feels weakness to both lower extremities. Continues to have pain in his back. He also has anemia and renal insufficiency. Patient reported that he did see nephrology. He also has appointment with endocrinology. Past Medical History:   Diagnosis Date    Bipolar 1 disorder, depressed ([address]     Bipolar disorder ([address]     Depression     Diabetes ([address]     DKA, type 1 ([address] 1/27/2013    diagnosed age 23    H/O noncompliance with medical treatment, presenting hazards to health     MRSA (methicillin resistant staph aureus) culture positive     MRSA (methicillin resistant Staphylococcus aureus)     Face    Noncompliance with medication regimen     Second hand smoke exposure     Seizure ([address]     Seizures ([address] 2006 or 2007    one episode during prison     Past Surgical History:   Procedure Laterality Date    HX HEENT      top left wisdom tooth    HX ORTHOPAEDIC Left     wrist; MCV    UPPER GI ENDOSCOPY,BIOPSY  11/20/2018          Current Outpatient Medications   Medication Sig    rosuvastatin (CRESTOR) 20 mg tablet Take 1 Tablet by mouth nightly.  oxyCODONE IR (Roxicodone) 5 mg immediate release tablet Take 1 Tablet by mouth nightly as needed for Pain for up to 30 days. Max Daily Amount: 5 mg.  amLODIPine (NORVASC) 5 mg tablet Take 1 Tablet by mouth daily.  carvediloL (COREG) 12.5 mg tablet Take 1 Tablet by mouth two (2) times daily (with meals).  furosemide (LASIX) 20 mg tablet Take 1 Tablet by mouth daily.  mupirocin (BACTROBAN) 2 % ointment Apply  to affected area daily.  Apply until symptoms resolve    insulin glargine (LANTUS) 100 unit/mL injection 16 Units by SubCUTAneous route daily.  tamsulosin (FLOMAX) 0.4 mg capsule Take 0.4 mg by mouth daily.  insulin aspart U-100 (NovoLOG Flexpen U-100 Insulin) 100 unit/mL (3 mL) inpn 2 Units by SubCUTAneous route Before breakfast, lunch, and dinner.  naloxone (Narcan) 4 mg/actuation nasal spray Use 1 spray intranasally, then discard. Repeat with new spray every 2 min as needed for opioid overdose symptoms, alternating nostrils.  pen needle, diabetic 30 gauge x 3/16\" ndle 5 Units by Does Not Apply route Before breakfast, lunch, and dinner.  finasteride (PROSCAR) 5 mg tablet Take 1 Tablet by mouth daily.  ondansetron (ZOFRAN ODT) 4 mg disintegrating tablet Take 1 Tablet by mouth every eight (8) hours as needed for Nausea or Vomiting.  DULoxetine (CYMBALTA) 60 mg capsule Take 1 Capsule by mouth daily.  pantoprazole (Protonix) 40 mg tablet Take 1 Tablet by mouth daily.  pregabalin (Lyrica) 75 mg capsule Take 75 mg by mouth two (2) times a day.  clotrimazole (LOTRIMIN) 1 % topical cream Apply  to affected area two (2) times a day for 30 days. Apply twice a day for 2-4 weeks. Apply until symptoms resolve.  ergocalciferol (ERGOCALCIFEROL) 1,250 mcg (50,000 unit) capsule Take 1 capsule by mouth once a week (Patient not taking: Reported on 5/10/2022)     No current facility-administered medications for this visit.      Health Maintenance   Topic Date Due    Pneumococcal 0-64 years (2 - PCV) 08/18/2012    Foot Exam Q1  03/07/2021    COVID-19 Vaccine (3 - Booster for Moderna series) 03/13/2022    MICROALBUMIN Q1  04/01/2022    A1C test (Diabetic or Prediabetic)  06/08/2022    Eye Exam Retinal or Dilated  06/18/2022    Flu Vaccine (Season Ended) 09/01/2022    Depression Screen  02/24/2023    Lipid Screen  04/01/2023    DTaP/Tdap/Td series (2 - Td or Tdap) 03/09/2028    Hepatitis C Screening  Completed     Immunization History   Administered Date(s) Administered    (RETIRED) Pneumococcal Vaccine (Unspecified Type) 08/18/2011    COVID-19, Moderna, Primary or Immunocompromised Series, MRNA, PF, 100mcg/0.5mL 09/11/2021, 10/13/2021    MMR 03/23/1995    Pneumococcal Polysaccharide (PPSV-23) 08/18/2011    Rabies Vaccine IM 09/17/1992    TD Vaccine 03/08/2011    Tdap 03/09/2018     No LMP for male patient. Allergies and Intolerances:   No Known Allergies    Family History:   Family History   Problem Relation Age of Onset    Diabetes Mother     Diabetes Other         neice, type 1        Social History:   He  reports that he quit smoking about 2 years ago. His smoking use included cigarettes. He started smoking about 22 years ago. He has a 1.60 pack-year smoking history. He has never used smokeless tobacco.  He  reports no history of alcohol use.             Review of Systems:   General: negative for - chills, fatigue, fever, weight change  Psych: negative for - anxiety, depression, irritability or mood swings  ENT: negative for - headaches, hearing change, nasal congestion, oral lesions, sneezing or sore throat  Heme/ Lymph: negative for - bleeding problems, bruising, pallor or swollen lymph nodes  Endo: negative for - hot flashes, polydipsia/polyuria or temperature intolerance  Resp: negative for - cough, shortness of breath or wheezing  CV: negative for - chest pain, edema or palpitations  GI: negative for - abdominal pain, change in bowel habits, constipation, diarrhea or nausea/vomiting  : negative for - dysuria, hematuria, incontinence, pelvic pain or vulvar/vaginal symptoms  MSK: negative for - joint pain, joint swelling or muscle pain  Neuro: negative for - confusion, headaches, seizures or weakness  Derm: negative for - dry skin, hair changes, rash or skin lesion changes          Physical:   Vitals:   Vitals:    06/02/22 1036   BP: 102/64   Pulse: 80   Resp: 18   Temp: 97.3 °F (36.3 °C)   TempSrc: Temporal   SpO2: 98%   Weight: 163 lb (73.9 kg)   Height: 5' 8\" (1.727 m)           Exam:   HEENT- atraumatic,normocephalic, awake, oriented, well nourished  Neck - supple,no enlarged lymph nodes, no JVD, no thyromegaly  Chest- CTA, no rhonchi, no crackles  Heart- rrr, no murmurs / gallop/rub  Abdomen- soft,BS+,NT, no hepatosplenomegaly  Ext - no c/c/edema   Neuro- no focal deficits. Power 5/5 all extremities  Skin - warm,dry, no obvious rashes. Review of Data:   LABS:   Lab Results   Component Value Date/Time    WBC 9.8 05/17/2022 02:48 AM    HGB 8.4 (L) 05/17/2022 02:48 AM    HCT 24.4 (L) 05/17/2022 02:48 AM    PLATELET 713 (H) 57/69/0589 02:48 AM     Lab Results   Component Value Date/Time    Sodium 133 (L) 05/19/2022 02:26 PM    Potassium 4.9 05/19/2022 02:26 PM    Chloride 102 05/19/2022 02:26 PM    CO2 23 05/19/2022 02:26 PM    Glucose 313 (H) 05/19/2022 02:26 PM    BUN 38 (H) 05/19/2022 02:26 PM    Creatinine 2.56 (H) 05/19/2022 02:26 PM     Lab Results   Component Value Date/Time    Cholesterol, total 192 11/15/2021 12:01 PM    HDL Cholesterol 61 11/15/2021 12:01 PM    LDL, calculated 89.4 11/15/2021 12:01 PM    Triglyceride 208 (H) 11/15/2021 12:01 PM     No components found for: GPT        Impression / Plan:        ICD-10-CM ICD-9-CM    1. Hyperlipidemia, unspecified hyperlipidemia type  E78.5 272.4    2. DM type 2, goal HbA1c < 7% (HCC)  E11.9 250.00    3. Hypercholesteremia  E78.00 272.0 rosuvastatin (CRESTOR) 20 mg tablet   4. Chronic midline low back pain without sciatica  M54.50 724.2 REFERRAL TO SPINE SURGERY    G89.29 338.29 REFERRAL TO PHYSICAL THERAPY      oxyCODONE IR (Roxicodone) 5 mg immediate release tablet   5. Other osteoarthritis of spine, cervical region  M47.892 721.0 oxyCODONE IR (Roxicodone) 5 mg immediate release tablet   6. Osteoarthritis of lumbar spine, unspecified spinal osteoarthritis complication status  T16.878 721.3 REFERRAL TO SPINE SURGERY      REFERRAL TO PHYSICAL THERAPY      oxyCODONE IR (Roxicodone) 5 mg immediate release tablet   7.  Elevated alkaline phosphatase level  R74.8 790.5 ALK PHOS ISOENZYMES      ALK PHOS ISOENZYMES   8. Anemia, unspecified type  D64.9 285.9 IRON PROFILE      FERRITIN      PROTEIN ELECTROPHORESIS      PROTEIN ELECTROPHORESIS      FERRITIN      IRON PROFILE   9. Chronic kidney disease, unspecified CKD stage  N18.9 585.9 PROTEIN ELECTROPHORESIS      FREE LIGHT CHAINS, KAPPA/LAMBDA, QT      FREE LIGHT CHAINS, KAPPA/LAMBDA, QT      PROTEIN ELECTROPHORESIS   10. Right hip pain  M25.551 719.45 oxyCODONE IR (Roxicodone) 5 mg immediate release tablet   11. Prostate cancer screening  Z12.5 V76.44 PSA, DIAGNOSTIC (PROSTATE SPECIFIC AG)      PSA, DIAGNOSTIC (PROSTATE SPECIFIC AG)     Inform patient that narcotics will not be refilled in the future. He will have to see pain management    Diabetic neuropathy-informed patient that the key to neuropathy is control of diabetes    Hypertension-blood pressure has improved      Renal insufficiency, anemia, elevated alk phos-we will rule out multiple myeloma. Mild elevation of liver LFTs-we will reduce the dose of Crestor. Explained to patient risk benefits of the medications. Advised patient to stop meds if having any side effects. Pt verbalized understanding of the instructions. I have discussed the diagnosis with the patient and the intended plan as seen in the above orders. The patient has received an after-visit summary and questions were answered concerning future plans. I have discussed medication side effects and warnings with the patient as well. I have reviewed the plan of care with the patient, accepted their input and they are in agreement with the treatment goals. Reviewed plan of care. Patient has provided input and agrees with goals. Follow-up and Dispositions    · Return in about 6 weeks (around 7/14/2022).          Daniel Torres MD

## 2022-06-02 NOTE — PROGRESS NOTES
Chief Complaint   Patient presents with    Hypertension    Follow-up     2 week follow up        Visit Vitals  /64 (BP 1 Location: Left upper arm, BP Patient Position: Sitting)   Pulse 80   Temp 97.3 °F (36.3 °C) (Temporal)   Resp 18   Ht 5' 8\" (1.727 m)   Wt 163 lb (73.9 kg)   SpO2 98%   BMI 24.78 kg/m²        Health Maintenance Due   Topic    Pneumococcal 0-64 years (2 - PCV)    Foot Exam Q1     COVID-19 Vaccine (3 - Booster for Moderna series)    MICROALBUMIN Q1     A1C test (Diabetic or Prediabetic)         1. Have you been to the ER, urgent care clinic since your last visit? Hospitalized since your last visit? No    2. Have you seen or consulted any other health care providers outside of the [address] [place] [address] since your last visit? Include any pap smears or colon screening.  No

## 2022-06-03 LAB
FERRITIN SERPL-MCNC: 61 NG/ML (ref 26–388)
IRON SATN MFR SERPL: 53 % (ref 20–50)
IRON SERPL-MCNC: 120 UG/DL (ref 35–150)
PSA SERPL-MCNC: 0.2 NG/ML (ref 0.01–4)
TIBC SERPL-MCNC: 227 UG/DL (ref 250–450)

## 2022-06-05 LAB
ALBUMIN SERPL ELPH-MCNC: 2.2 G/DL (ref 2.9–4.4)
ALBUMIN/GLOB SERPL: 0.7 {RATIO} (ref 0.7–1.7)
ALPHA1 GLOB SERPL ELPH-MCNC: 0.2 G/DL (ref 0–0.4)
ALPHA2 GLOB SERPL ELPH-MCNC: 1.4 G/DL (ref 0.4–1)
B-GLOBULIN SERPL ELPH-MCNC: 0.9 G/DL (ref 0.7–1.3)
GAMMA GLOB SERPL ELPH-MCNC: 0.5 G/DL (ref 0.4–1.8)
GLOBULIN SER CALC-MCNC: 3.1 G/DL (ref 2.2–3.9)
M PROTEIN SERPL ELPH-MCNC: ABNORMAL G/DL
PROT SERPL-MCNC: 5.3 G/DL (ref 6–8.5)

## 2022-06-06 DIAGNOSIS — N18.9 CHRONIC KIDNEY DISEASE, UNSPECIFIED CKD STAGE: ICD-10-CM

## 2022-06-06 DIAGNOSIS — R76.8 ELEVATED SERUM IMMUNOGLOBULIN FREE LIGHT CHAIN LEVEL: Primary | ICD-10-CM

## 2022-06-06 DIAGNOSIS — D64.9 ANEMIA, UNSPECIFIED TYPE: ICD-10-CM

## 2022-06-06 LAB
KAPPA LC FREE SER-MCNC: 62.3 MG/L (ref 3.3–19.4)
KAPPA LC FREE/LAMBDA FREE SER: 1.41 {RATIO} (ref 0.26–1.65)
LAMBDA LC FREE SERPL-MCNC: 44.2 MG/L (ref 5.7–26.3)

## 2022-06-07 ENCOUNTER — HOSPITAL ENCOUNTER (INPATIENT)
Age: 42
LOS: 2 days | Discharge: HOME | DRG: 420 | End: 2022-06-09
Attending: STUDENT IN AN ORGANIZED HEALTH CARE EDUCATION/TRAINING PROGRAM | Admitting: HOSPITALIST
Payer: COMMERCIAL

## 2022-06-07 ENCOUNTER — TELEPHONE (OUTPATIENT)
Dept: PRIMARY CARE CLINIC | Age: 42
End: 2022-06-07

## 2022-06-07 ENCOUNTER — APPOINTMENT (OUTPATIENT)
Dept: GENERAL RADIOLOGY | Age: 42
DRG: 420 | End: 2022-06-07
Attending: STUDENT IN AN ORGANIZED HEALTH CARE EDUCATION/TRAINING PROGRAM
Payer: COMMERCIAL

## 2022-06-07 DIAGNOSIS — R11.2 NAUSEA AND VOMITING, UNSPECIFIED VOMITING TYPE: ICD-10-CM

## 2022-06-07 DIAGNOSIS — E10.10 TYPE 1 DIABETES MELLITUS WITH KETOACIDOSIS WITHOUT COMA (HCC): Primary | ICD-10-CM

## 2022-06-07 DIAGNOSIS — N17.9 AKI (ACUTE KIDNEY INJURY) (HCC): ICD-10-CM

## 2022-06-07 LAB
ADMINISTERED INITIALS, ADMINIT: NORMAL
ALBUMIN SERPL-MCNC: 1.7 G/DL (ref 3.5–5)
ALBUMIN/GLOB SERPL: 0.4 {RATIO} (ref 1.1–2.2)
ALP BONE CFR SERPL: 30 % (ref 12–68)
ALP INTEST CFR SERPL: 0 % (ref 0–18)
ALP LIVER CFR SERPL: 70 % (ref 13–88)
ALP SERPL-CCNC: 373 IU/L (ref 44–121)
ALP SERPL-CCNC: 397 U/L (ref 45–117)
ALT SERPL-CCNC: 94 U/L (ref 12–78)
ANION GAP SERPL CALC-SCNC: 28 MMOL/L (ref 5–15)
ANION GAP SERPL CALC-SCNC: 30 MMOL/L (ref 5–15)
APPEARANCE UR: CLEAR
ARTERIAL PATENCY WRIST A: YES
AST SERPL-CCNC: 27 U/L (ref 15–37)
BACTERIA URNS QL MICRO: ABNORMAL /HPF
BASE DEFICIT BLD-SCNC: 14.2 MMOL/L
BASE DEFICIT BLDA-SCNC: 9.7 MMOL/L
BASOPHILS # BLD: 0 K/UL (ref 0–0.1)
BASOPHILS NFR BLD: 0 % (ref 0–1)
BDY SITE: ABNORMAL
BILIRUB SERPL-MCNC: 0.4 MG/DL (ref 0.2–1)
BILIRUB UR QL: NEGATIVE
BUN SERPL-MCNC: 74 MG/DL (ref 6–20)
BUN SERPL-MCNC: 82 MG/DL (ref 6–20)
BUN/CREAT SERPL: 19 (ref 12–20)
BUN/CREAT SERPL: 20 (ref 12–20)
CA-I BLD-MCNC: 1.01 MMOL/L (ref 1.12–1.32)
CALCIUM SERPL-MCNC: 7.5 MG/DL (ref 8.5–10.1)
CALCIUM SERPL-MCNC: 8.6 MG/DL (ref 8.5–10.1)
CHLORIDE BLD-SCNC: 95 MMOL/L (ref 100–108)
CHLORIDE SERPL-SCNC: 79 MMOL/L (ref 97–108)
CHLORIDE SERPL-SCNC: 86 MMOL/L (ref 97–108)
CO2 BLD-SCNC: 12 MMOL/L (ref 19–24)
CO2 SERPL-SCNC: 11 MMOL/L (ref 21–32)
CO2 SERPL-SCNC: 14 MMOL/L (ref 21–32)
COLOR UR: ABNORMAL
COVID-19 RAPID TEST, COVR: NOT DETECTED
CREAT SERPL-MCNC: 3.65 MG/DL (ref 0.7–1.3)
CREAT SERPL-MCNC: 4.26 MG/DL (ref 0.7–1.3)
CREAT UR-MCNC: 3.6 MG/DL (ref 0.6–1.3)
D50 ADMINISTERED, D50ADM: 0 ML
D50 ORDER, D50ORD: 0 ML
DIFFERENTIAL METHOD BLD: ABNORMAL
EOSINOPHIL # BLD: 0.1 K/UL (ref 0–0.4)
EOSINOPHIL NFR BLD: 0 % (ref 0–7)
EPITH CASTS URNS QL MICRO: ABNORMAL /LPF
ERYTHROCYTE [DISTWIDTH] IN BLOOD BY AUTOMATED COUNT: 14.8 % (ref 11.5–14.5)
GLOBULIN SER CALC-MCNC: 4.1 G/DL (ref 2–4)
GLSCOM COMMENTS: NORMAL
GLUCOSE BLD STRIP.AUTO-MCNC: 292 MG/DL (ref 65–117)
GLUCOSE BLD STRIP.AUTO-MCNC: 353 MG/DL (ref 65–117)
GLUCOSE BLD STRIP.AUTO-MCNC: 434 MG/DL (ref 65–117)
GLUCOSE BLD STRIP.AUTO-MCNC: 504 MG/DL (ref 65–117)
GLUCOSE BLD STRIP.AUTO-MCNC: >600 MG/DL (ref 65–117)
GLUCOSE BLD STRIP.AUTO-MCNC: >700 MG/DL (ref 74–106)
GLUCOSE SERPL-MCNC: 652 MG/DL (ref 65–100)
GLUCOSE SERPL-MCNC: 938 MG/DL (ref 65–100)
GLUCOSE UR STRIP.AUTO-MCNC: >1000 MG/DL
GLUCOSE, GLC: 292 MG/DL
GLUCOSE, GLC: 353 MG/DL
GLUCOSE, GLC: 504 MG/DL
GLUCOSE, GLC: 600 MG/DL
GLUCOSE, GLC: 601 MG/DL
GRAN CASTS URNS QL MICRO: ABNORMAL /LPF
HCO3 BLDA-SCNC: 12 MMOL/L
HCO3 BLDA-SCNC: 13 MMOL/L (ref 22–26)
HCT VFR BLD AUTO: 31.1 % (ref 36.6–50.3)
HGB BLD-MCNC: 10.1 G/DL (ref 12.1–17)
HGB UR QL STRIP: ABNORMAL
HIGH TARGET, HITG: 250 MG/DL
IMM GRANULOCYTES # BLD AUTO: 0 K/UL (ref 0–0.04)
IMM GRANULOCYTES NFR BLD AUTO: 0 % (ref 0–0.5)
INSULIN ADMINSTERED, INSADM: 10.8 UNITS/HOUR
INSULIN ADMINSTERED, INSADM: 16.2 UNITS/HOUR
INSULIN ADMINSTERED, INSADM: 16.2 UNITS/HOUR
INSULIN ADMINSTERED, INSADM: 17.6 UNITS/HOUR
INSULIN ADMINSTERED, INSADM: 21.6 UNITS/HOUR
INSULIN ADMINSTERED, INSADM: 26.6 UNITS/HOUR
INSULIN ADMINSTERED, INSADM: 27 UNITS/HOUR
INSULIN ORDER, INSORD: 10.8 UNITS/HOUR
INSULIN ORDER, INSORD: 16.2 UNITS/HOUR
INSULIN ORDER, INSORD: 16.2 UNITS/HOUR
INSULIN ORDER, INSORD: 17.6 UNITS/HOUR
INSULIN ORDER, INSORD: 21.6 UNITS/HOUR
INSULIN ORDER, INSORD: 26.6 UNITS/HOUR
INSULIN ORDER, INSORD: 27 UNITS/HOUR
KETONES UR QL STRIP.AUTO: 40 MG/DL
LACTATE BLD-SCNC: 1.82 MMOL/L (ref 0.4–2)
LEUKOCYTE ESTERASE UR QL STRIP.AUTO: NEGATIVE
LIPASE SERPL-CCNC: 233 U/L (ref 73–393)
LOW TARGET, LOT: 150 MG/DL
LYMPHOCYTES # BLD: 1.2 K/UL (ref 0.8–3.5)
LYMPHOCYTES NFR BLD: 7 % (ref 12–49)
MAGNESIUM SERPL-MCNC: 2.5 MG/DL (ref 1.6–2.4)
MAGNESIUM SERPL-MCNC: 3.2 MG/DL (ref 1.6–2.4)
MCH RBC QN AUTO: 28.8 PG (ref 26–34)
MCHC RBC AUTO-ENTMCNC: 32.5 G/DL (ref 30–36.5)
MCV RBC AUTO: 88.6 FL (ref 80–99)
MINUTES UNTIL NEXT BG, NBG: 60 MIN
MONOCYTES # BLD: 0.6 K/UL (ref 0–1)
MONOCYTES NFR BLD: 4 % (ref 5–13)
MULTIPLIER, MUL: 0.02
MULTIPLIER, MUL: 0.03
MULTIPLIER, MUL: 0.04
MULTIPLIER, MUL: 0.05
MULTIPLIER, MUL: 0.06
MULTIPLIER, MUL: 0.06
MULTIPLIER, MUL: 0.07
NEUTS SEG # BLD: 16.1 K/UL (ref 1.8–8)
NEUTS SEG NFR BLD: 89 % (ref 32–75)
NITRITE UR QL STRIP.AUTO: NEGATIVE
NRBC # BLD: 0 K/UL (ref 0–0.01)
NRBC BLD-RTO: 0 PER 100 WBC
ORDER INITIALS, ORDINIT: NORMAL
PCO2 BLDA: 22 MMHG (ref 35–45)
PCO2 BLDV: 28.1 MMHG (ref 41–51)
PH BLDA: 7.38 [PH] (ref 7.35–7.45)
PH BLDV: 7.24 [PH] (ref 7.32–7.42)
PH UR STRIP: 5.5 [PH] (ref 5–8)
PHOSPHATE SERPL-MCNC: 7.1 MG/DL (ref 2.6–4.7)
PLATELET # BLD AUTO: 522 K/UL (ref 150–400)
PMV BLD AUTO: 11.3 FL (ref 8.9–12.9)
PO2 BLDA: 87 MMHG (ref 80–100)
PO2 BLDV: 48 MMHG (ref 25–40)
POTASSIUM BLD-SCNC: 4.3 MMOL/L (ref 3.5–5.5)
POTASSIUM SERPL-SCNC: 3.7 MMOL/L (ref 3.5–5.1)
POTASSIUM SERPL-SCNC: 4.2 MMOL/L (ref 3.5–5.1)
PROCALCITONIN SERPL-MCNC: 2.25 NG/ML
PROT SERPL-MCNC: 5.8 G/DL (ref 6.4–8.2)
PROT UR STRIP-MCNC: >300 MG/DL
RBC # BLD AUTO: 3.51 M/UL (ref 4.1–5.7)
RBC #/AREA URNS HPF: ABNORMAL /HPF (ref 0–5)
SAO2 % BLD: 97 % (ref 92–97)
SAO2% DEVICE SAO2% SENSOR NAME: ABNORMAL
SERVICE CMNT-IMP: ABNORMAL
SODIUM BLD-SCNC: 128 MMOL/L (ref 136–145)
SODIUM SERPL-SCNC: 118 MMOL/L (ref 136–145)
SODIUM SERPL-SCNC: 130 MMOL/L (ref 136–145)
SOURCE, COVRS: NORMAL
SP GR UR REFRACTOMETRY: 1.02
SPECIMEN SITE: ABNORMAL
SPECIMEN SITE: ABNORMAL
TROPONIN-HIGH SENSITIVITY: 10 NG/L (ref 0–76)
UROBILINOGEN UR QL STRIP.AUTO: 0.2 EU/DL (ref 0.2–1)
WBC # BLD AUTO: 18.1 K/UL (ref 4.1–11.1)
WBC URNS QL MICRO: ABNORMAL /HPF (ref 0–4)

## 2022-06-07 PROCEDURE — 84145 PROCALCITONIN (PCT): CPT

## 2022-06-07 PROCEDURE — 83735 ASSAY OF MAGNESIUM: CPT

## 2022-06-07 PROCEDURE — C9113 INJ PANTOPRAZOLE SODIUM, VIA: HCPCS | Performed by: STUDENT IN AN ORGANIZED HEALTH CARE EDUCATION/TRAINING PROGRAM

## 2022-06-07 PROCEDURE — 74011250636 HC RX REV CODE- 250/636: Performed by: STUDENT IN AN ORGANIZED HEALTH CARE EDUCATION/TRAINING PROGRAM

## 2022-06-07 PROCEDURE — 94640 AIRWAY INHALATION TREATMENT: CPT

## 2022-06-07 PROCEDURE — 83605 ASSAY OF LACTIC ACID: CPT

## 2022-06-07 PROCEDURE — 82947 ASSAY GLUCOSE BLOOD QUANT: CPT

## 2022-06-07 PROCEDURE — 93005 ELECTROCARDIOGRAM TRACING: CPT

## 2022-06-07 PROCEDURE — 36600 WITHDRAWAL OF ARTERIAL BLOOD: CPT

## 2022-06-07 PROCEDURE — 96375 TX/PRO/DX INJ NEW DRUG ADDON: CPT

## 2022-06-07 PROCEDURE — 87635 SARS-COV-2 COVID-19 AMP PRB: CPT

## 2022-06-07 PROCEDURE — 65270000046 HC RM TELEMETRY

## 2022-06-07 PROCEDURE — 36415 COLL VENOUS BLD VENIPUNCTURE: CPT

## 2022-06-07 PROCEDURE — 71045 X-RAY EXAM CHEST 1 VIEW: CPT

## 2022-06-07 PROCEDURE — 80053 COMPREHEN METABOLIC PANEL: CPT

## 2022-06-07 PROCEDURE — 83690 ASSAY OF LIPASE: CPT

## 2022-06-07 PROCEDURE — 74011250637 HC RX REV CODE- 250/637: Performed by: HOSPITALIST

## 2022-06-07 PROCEDURE — 74011250636 HC RX REV CODE- 250/636: Performed by: HOSPITALIST

## 2022-06-07 PROCEDURE — 82803 BLOOD GASES ANY COMBINATION: CPT

## 2022-06-07 PROCEDURE — 87040 BLOOD CULTURE FOR BACTERIA: CPT

## 2022-06-07 PROCEDURE — 74011636637 HC RX REV CODE- 636/637: Performed by: STUDENT IN AN ORGANIZED HEALTH CARE EDUCATION/TRAINING PROGRAM

## 2022-06-07 PROCEDURE — 99285 EMERGENCY DEPT VISIT HI MDM: CPT

## 2022-06-07 PROCEDURE — 84484 ASSAY OF TROPONIN QUANT: CPT

## 2022-06-07 PROCEDURE — 85025 COMPLETE CBC W/AUTO DIFF WBC: CPT

## 2022-06-07 PROCEDURE — 82962 GLUCOSE BLOOD TEST: CPT

## 2022-06-07 PROCEDURE — C9113 INJ PANTOPRAZOLE SODIUM, VIA: HCPCS | Performed by: HOSPITALIST

## 2022-06-07 PROCEDURE — 74011000250 HC RX REV CODE- 250: Performed by: HOSPITALIST

## 2022-06-07 PROCEDURE — 74011000258 HC RX REV CODE- 258: Performed by: STUDENT IN AN ORGANIZED HEALTH CARE EDUCATION/TRAINING PROGRAM

## 2022-06-07 PROCEDURE — 74011000250 HC RX REV CODE- 250: Performed by: STUDENT IN AN ORGANIZED HEALTH CARE EDUCATION/TRAINING PROGRAM

## 2022-06-07 PROCEDURE — 80048 BASIC METABOLIC PNL TOTAL CA: CPT

## 2022-06-07 PROCEDURE — 96374 THER/PROPH/DIAG INJ IV PUSH: CPT

## 2022-06-07 PROCEDURE — 81001 URINALYSIS AUTO W/SCOPE: CPT

## 2022-06-07 PROCEDURE — 84100 ASSAY OF PHOSPHORUS: CPT

## 2022-06-07 RX ORDER — OXYCODONE HYDROCHLORIDE 5 MG/1
5 TABLET ORAL
Status: DISCONTINUED | OUTPATIENT
Start: 2022-06-07 | End: 2022-06-09 | Stop reason: HOSPADM

## 2022-06-07 RX ORDER — ONDANSETRON 2 MG/ML
4 INJECTION INTRAMUSCULAR; INTRAVENOUS
Status: DISCONTINUED | OUTPATIENT
Start: 2022-06-07 | End: 2022-06-09 | Stop reason: HOSPADM

## 2022-06-07 RX ORDER — ACETAMINOPHEN 650 MG/1
650 SUPPOSITORY RECTAL
Status: DISCONTINUED | OUTPATIENT
Start: 2022-06-07 | End: 2022-06-09 | Stop reason: HOSPADM

## 2022-06-07 RX ORDER — METOCLOPRAMIDE HYDROCHLORIDE 5 MG/ML
10 INJECTION INTRAMUSCULAR; INTRAVENOUS
Status: COMPLETED | OUTPATIENT
Start: 2022-06-07 | End: 2022-06-07

## 2022-06-07 RX ORDER — FINASTERIDE 5 MG/1
5 TABLET, FILM COATED ORAL DAILY
Status: DISCONTINUED | OUTPATIENT
Start: 2022-06-08 | End: 2022-06-09 | Stop reason: HOSPADM

## 2022-06-07 RX ORDER — TAMSULOSIN HYDROCHLORIDE 0.4 MG/1
0.4 CAPSULE ORAL DAILY
Status: DISCONTINUED | OUTPATIENT
Start: 2022-06-08 | End: 2022-06-09 | Stop reason: HOSPADM

## 2022-06-07 RX ORDER — ROSUVASTATIN CALCIUM 20 MG/1
20 TABLET, COATED ORAL
Status: DISCONTINUED | OUTPATIENT
Start: 2022-06-07 | End: 2022-06-08

## 2022-06-07 RX ORDER — ONDANSETRON 2 MG/ML
4 INJECTION INTRAMUSCULAR; INTRAVENOUS
Status: COMPLETED | OUTPATIENT
Start: 2022-06-07 | End: 2022-06-07

## 2022-06-07 RX ORDER — VANCOMYCIN 1.75 GRAM/500 ML IN 0.9 % SODIUM CHLORIDE INTRAVENOUS
1750 ONCE
Status: COMPLETED | OUTPATIENT
Start: 2022-06-08 | End: 2022-06-08

## 2022-06-07 RX ORDER — PREGABALIN 75 MG/1
75 CAPSULE ORAL 2 TIMES DAILY
Status: DISCONTINUED | OUTPATIENT
Start: 2022-06-07 | End: 2022-06-09 | Stop reason: HOSPADM

## 2022-06-07 RX ORDER — HEPARIN SODIUM 5000 [USP'U]/ML
5000 INJECTION, SOLUTION INTRAVENOUS; SUBCUTANEOUS EVERY 8 HOURS
Status: DISCONTINUED | OUTPATIENT
Start: 2022-06-07 | End: 2022-06-09 | Stop reason: HOSPADM

## 2022-06-07 RX ORDER — MAGNESIUM SULFATE 100 %
4 CRYSTALS MISCELLANEOUS AS NEEDED
Status: DISCONTINUED | OUTPATIENT
Start: 2022-06-07 | End: 2022-06-09

## 2022-06-07 RX ORDER — DEXTROSE MONOHYDRATE 100 MG/ML
0-250 INJECTION, SOLUTION INTRAVENOUS AS NEEDED
Status: DISCONTINUED | OUTPATIENT
Start: 2022-06-07 | End: 2022-06-09

## 2022-06-07 RX ORDER — SODIUM CHLORIDE 9 MG/ML
125 INJECTION, SOLUTION INTRAVENOUS CONTINUOUS
Status: DISCONTINUED | OUTPATIENT
Start: 2022-06-07 | End: 2022-06-08

## 2022-06-07 RX ORDER — POTASSIUM CHLORIDE AND SODIUM CHLORIDE 450; 150 MG/100ML; MG/100ML
1000 INJECTION, SOLUTION INTRAVENOUS ONCE
Status: COMPLETED | OUTPATIENT
Start: 2022-06-07 | End: 2022-06-07

## 2022-06-07 RX ORDER — DULOXETIN HYDROCHLORIDE 30 MG/1
60 CAPSULE, DELAYED RELEASE ORAL DAILY
Status: DISCONTINUED | OUTPATIENT
Start: 2022-06-08 | End: 2022-06-09 | Stop reason: HOSPADM

## 2022-06-07 RX ADMIN — HEPARIN SODIUM 5000 UNITS: 5000 INJECTION INTRAVENOUS; SUBCUTANEOUS at 22:51

## 2022-06-07 RX ADMIN — SODIUM CHLORIDE 21.6 UNITS/HR: 9 INJECTION, SOLUTION INTRAVENOUS at 17:15

## 2022-06-07 RX ADMIN — SODIUM CHLORIDE 10.8 UNITS/HR: 9 INJECTION, SOLUTION INTRAVENOUS at 15:00

## 2022-06-07 RX ADMIN — METOCLOPRAMIDE 10 MG: 5 INJECTION, SOLUTION INTRAMUSCULAR; INTRAVENOUS at 14:38

## 2022-06-07 RX ADMIN — SODIUM CHLORIDE 40 MG: 9 INJECTION, SOLUTION INTRAMUSCULAR; INTRAVENOUS; SUBCUTANEOUS at 22:51

## 2022-06-07 RX ADMIN — SODIUM CHLORIDE 1000 ML: 9 INJECTION, SOLUTION INTRAVENOUS at 13:48

## 2022-06-07 RX ADMIN — PREGABALIN 75 MG: 75 CAPSULE ORAL at 18:45

## 2022-06-07 RX ADMIN — ROSUVASTATIN 20 MG: 20 TABLET, FILM COATED ORAL at 22:00

## 2022-06-07 RX ADMIN — ONDANSETRON 4 MG: 2 INJECTION INTRAMUSCULAR; INTRAVENOUS at 13:51

## 2022-06-07 RX ADMIN — POTASSIUM CHLORIDE AND SODIUM CHLORIDE 1000 ML: 450; 150 INJECTION, SOLUTION INTRAVENOUS at 15:00

## 2022-06-07 RX ADMIN — SODIUM CHLORIDE 17.6 UNITS/HR: 9 INJECTION, SOLUTION INTRAVENOUS at 21:46

## 2022-06-07 RX ADMIN — SODIUM CHLORIDE 125 ML/HR: 9 INJECTION, SOLUTION INTRAVENOUS at 17:59

## 2022-06-07 RX ADMIN — SODIUM CHLORIDE 40 MG: 9 INJECTION, SOLUTION INTRAMUSCULAR; INTRAVENOUS; SUBCUTANEOUS at 13:51

## 2022-06-07 RX ADMIN — FAMOTIDINE 20 MG: 10 INJECTION INTRAVENOUS at 13:51

## 2022-06-07 NOTE — ED PROVIDER NOTES
EMERGENCY DEPARTMENT HISTORY AND PHYSICAL EXAM      Date: 6/7/2022  Patient Name: Constanza Pappas    History of Presenting Illness     Chief Complaint   Patient presents with    Vomiting     x 2 days. BGL is \"HI\"    High Blood Sugar         HPI: Constanza Pappas, 46 y.o. male presents to the ED with cc of \"I am in DKA. \"  2 days ago he began to feel sick, reports about 5-6 episodes of vomiting daily since then. He states that the vomit is now dark red in color. He denies any associated abdominal pain, no fevers. Denies any new sick contacts or new foods or exposures. Denies diarrhea, dysuria or hematuria. No chest pain, shortness of breath or cough. He states he has been compliant with all doses of his insulin, he has not missed any recently or had any changes to his medications. There are no other complaints, changes, or physical findings at this time. PCP: Rom Peguero MD    No current facility-administered medications on file prior to encounter. Current Outpatient Medications on File Prior to Encounter   Medication Sig Dispense Refill    rosuvastatin (CRESTOR) 20 mg tablet Take 1 Tablet by mouth nightly. 90 Tablet 0    oxyCODONE IR (Roxicodone) 5 mg immediate release tablet Take 1 Tablet by mouth nightly as needed for Pain for up to 30 days. Max Daily Amount: 5 mg. 15 Tablet 0    amLODIPine (NORVASC) 5 mg tablet Take 1 Tablet by mouth daily. 30 Tablet 1    carvediloL (COREG) 12.5 mg tablet Take 1 Tablet by mouth two (2) times daily (with meals). 60 Tablet 1    furosemide (LASIX) 20 mg tablet Take 1 Tablet by mouth daily. 30 Tablet 1    clotrimazole (LOTRIMIN) 1 % topical cream Apply  to affected area two (2) times a day for 30 days. Apply twice a day for 2-4 weeks. Apply until symptoms resolve. 113 g 0    mupirocin (BACTROBAN) 2 % ointment Apply  to affected area daily.  Apply until symptoms resolve 22 g 0    insulin glargine (LANTUS) 100 unit/mL injection 16 Units by SubCUTAneous route daily. 1 mL 0    tamsulosin (FLOMAX) 0.4 mg capsule Take 0.4 mg by mouth daily.  insulin aspart U-100 (NovoLOG Flexpen U-100 Insulin) 100 unit/mL (3 mL) inpn 2 Units by SubCUTAneous route Before breakfast, lunch, and dinner. 10 Adjustable Dose Pre-filled Pen Syringe 3    naloxone (Narcan) 4 mg/actuation nasal spray Use 1 spray intranasally, then discard. Repeat with new spray every 2 min as needed for opioid overdose symptoms, alternating nostrils. 1 Each 0    pen needle, diabetic 30 gauge x 3/16\" ndle 5 Units by Does Not Apply route Before breakfast, lunch, and dinner. 100 Each 3    finasteride (PROSCAR) 5 mg tablet Take 1 Tablet by mouth daily. 30 Tablet 2    ondansetron (ZOFRAN ODT) 4 mg disintegrating tablet Take 1 Tablet by mouth every eight (8) hours as needed for Nausea or Vomiting. 20 Tablet 0    DULoxetine (CYMBALTA) 60 mg capsule Take 1 Capsule by mouth daily. 30 Capsule 3    pantoprazole (Protonix) 40 mg tablet Take 1 Tablet by mouth daily. 30 Tablet 0    ergocalciferol (ERGOCALCIFEROL) 1,250 mcg (50,000 unit) capsule Take 1 capsule by mouth once a week (Patient not taking: Reported on 5/10/2022) 12 Capsule 0    pregabalin (Lyrica) 75 mg capsule Take 75 mg by mouth two (2) times a day.          Past History     Past Medical History:  Past Medical History:   Diagnosis Date    Bipolar 1 disorder, depressed ([facility]     Bipolar disorder ([facility]     Depression     Diabetes ([facility]     DKA, type 1 ([facility] 1/27/2013    diagnosed age 23    H/O noncompliance with medical treatment, presenting hazards to health     MRSA (methicillin resistant staph aureus) culture positive     MRSA (methicillin resistant Staphylococcus aureus)     Face    Noncompliance with medication regimen     Second hand smoke exposure     Seizure ([address]     Seizures ([address] 2006 or 2007    one episode during prison       Past Surgical History:  Past Surgical History:   Procedure Laterality Date    [facility] top left wisdom tooth    HX ORTHOPAEDIC Left     wrist; MCV    UPPER GI ENDOSCOPY,BIOPSY  2018            Family History:  Family History   Problem Relation Age of Onset    Diabetes Mother     Diabetes Other         neice, type 1        Social History:  Social History     Tobacco Use    Smoking status: Former Smoker     Packs/day: 0.10     Years: 16.00     Pack years: 1.60     Types: Cigarettes     Start date: 10/4/1999     Quit date: 2020     Years since quittin.2    Smokeless tobacco: Never Used   Substance Use Topics    Alcohol use: No    Drug use: No       Allergies:  No Known Allergies      Review of Systems   no fever  No ear pain  No eye pain  no shortness of breath  no chest pain  no abdominal pain  no dysuria  no leg pain  No rash  No lymphadenopathy  No weight loss    Physical Exam   Physical Exam  Constitutional:       Appearance: He is not toxic-appearing. Comments: Weak appearing, emesis bag in hand   HENT:      Head: Normocephalic and atraumatic. Mouth/Throat:      Mouth: Mucous membranes are moist.   Eyes:      Extraocular Movements: Extraocular movements intact. Cardiovascular:      Rate and Rhythm: Normal rate and regular rhythm. Pulmonary:      Effort: Pulmonary effort is normal.      Breath sounds: Normal breath sounds. Abdominal:      Palpations: Abdomen is soft. Tenderness: There is no abdominal tenderness. Musculoskeletal:         General: No deformity. Cervical back: Neck supple. Comments: 1+ pitting edema bilateral lower extremities, baseline per patient   Skin:     General: Skin is warm and dry. Neurological:      General: No focal deficit present. Mental Status: He is alert and oriented to person, place, and time.    Psychiatric:         Mood and Affect: Mood normal.         Diagnostic Study Results     Labs -     Recent Results (from the past 24 hour(s))   GLUCOSE, POC    Collection Time: 22 12:01 PM   Result Value Ref Range    Glucose (POC) >600 (HH) 65 - 117 mg/dL    Performed by Julio César LEDESMA [address], POC    Collection Time: 06/07/22 12:02 PM   Result Value Ref Range    Glucose (POC) >600 (HH) 65 - 117 mg/dL    Performed by Neda Shaw        Radiologic Studies -   XR CHEST PORT    (Results Pending)     CT Results  (Last 48 hours)    None        CXR Results  (Last 48 hours)    None            Medical Decision Making   I am the first provider for this patient. I reviewed the vital signs, available nursing notes, past medical history, past surgical history, family history and social history. Vital Signs-Reviewed the patient's vital signs. Patient Vitals for the past 24 hrs:   Temp Pulse Resp BP SpO2   06/07/22 1156 97.2 °F (36.2 °C) 79 16 (!) 100/59 100 %         Provider Notes (Medical Decision Making):   72-year-old male presenting with nausea and vomiting. Blood pressure on the soft side at 100/59 on presentation. Concern for possible DKA, electrolyte or metabolic abnormalities, dehydration, UTI, gastroenteritis, gastritis, Lillie-Estevez tear, peptic ulcer disease. His abdominal exam is benign and nontender, unlikely any other acute intra-abdominal infection or obstruction. IV fluids ordered. ED Course:     Initial assessment performed. The patients presenting problems have been discussed, and they are in agreement with the care plan formulated and outlined with them. I have encouraged them to ask questions as they arise throughout their visit. Point-of-care testing shows hyperglycemia with glucose greater than 700, creatinine elevated at 3.6, bicarb low at 12. He does have history of CKD per chart review, however this is trending up from recent baseline. Venous blood gas shows metabolic acidosis with pH of 7.24, bicarb low at 12, CO2 low at 28 consistent with partial respiratory compensation.     Patient is initiated on insulin drip, given potassium of 4.3, this will be supplemented with fluids. He is resting comfortably on reevaluation, blood pressure is improved. Continues to be nauseous, additional antiemetics will be ordered. CBC with leukocytosis of 18.1, likely in the setting of repeated emesis. Hemoglobin is 10.1, improved from prior per chart review. No fever, normal lactic acid at 1.82, unlikely systemic infection as cause for leukocytosis. Chart is reviewed, last hospitalized for DKA in 5/2022, noted to have UTI at that time, urine here pending. Critical Care Time:     CRITICAL CARE NOTE :    2:37 PM    IMPENDING DETERIORATION -Cardiovascular, CNS, Metabolic and Renal  ASSOCIATED RISK FACTORS - Hypotension, Shock, Metabolic changes and Dehydration  MANAGEMENT- Bedside Assessment and Supervision of Care  INTERPRETATION -  Xrays, Blood Gases and Blood Pressure  INTERVENTIONS -IV fluids, potassium, antiemetics, insulin  CASE REVIEW - Hospitalist/Intensivist  TREATMENT RESPONSE -Stable  PERFORMED BY - Self    NOTES   :  I have spent 50 minutes of critical care time involved in lab review, consultations with specialist, family decision- making, bedside attention and documentation. This time excludes time spent in any separate billed procedures. During this entire length of time I was immediately available to the patient . Piedad Tse MD      Disposition:  Admit    PLAN:  1. Current Discharge Medication List        2.    Follow-up Information    None       Return to ED if worse     Diagnosis     Clinical Impression: Acute DKA, acute nausea and vomiting, FELECIA

## 2022-06-07 NOTE — ED NOTES
Pt informed RN that he was cold and sweating, RN attempted to check temperature of pt, unable to obtain temp oral or axillary, Rectal temp obtained, 91.4 F, MD Walker notified, Verbal order received to place barb lopez. Will continue to monitor.

## 2022-06-07 NOTE — PROGRESS NOTES
Patient admitted from the ED on arrival temp 92.0 fritz jessica brewer on At 1815  FSBS done show HI on Glucometer  blood send to the [facility] awaiting for resalt.   1910 Lab reported patient Na 118 co2 11 Glucose 652 Dr Kat kitchen d

## 2022-06-07 NOTE — ED NOTES
Delay in labwork due to difficulty with IV. Ultrasound Tech at bedside at this time attempting to obtain IV access. [FreeTextEntry1] : Mr. VERGARA is doing well, with excellent post-operative recovery. The surgical incision is improving  There is no evidence of infection or complication, and patient is progressing as expected. proper wound care discussed with the patient and his mother.  Patient's questions and concerns addressed to patient's satisfaction.\par

## 2022-06-07 NOTE — PROGRESS NOTES
Lab results revealed elevated free light chains. I am referring patient to hematology for their opinion.

## 2022-06-07 NOTE — PROGRESS NOTES
End of Shift Note    Bedside shift change report given to Emily Castro (oncoming nurse) by Jermaine Colvin RN (offgoing nurse). Report included the following information SBAR    Shift worked:  7am-7pm     Shift summary and any significant changes:     New adm from ED      Concerns for physician to address:  hig blood [facility] phone for oncoming shift:          Activity:  Activity Level: Up ad leeanne,Up with Assistance  Number times ambulated in hallways past shift: 0  Number of times OOB to chair past shift: 0    Cardiac:   Cardiac Monitoring: Yes      Cardiac Rhythm: Sinus Rhythm    Access:   Current line(s): PIV     Genitourinary:   Urinary status: voiding    Respiratory:   O2 Device: None (Room air)  Chronic home O2 use?: NO  Incentive spirometer at bedside: NO       GI:  Last Bowel Movement Date: 06/07/22  Current diet:  ADULT DIET Clear Liquid  Passing flatus: YES  Tolerating current diet: YES       Pain Management:   Patient states pain is manageable on current regimen: YES    Skin:     Interventions: float heels    Patient Safety:  Fall Score:  Total Score: 3  Interventions: bed/chair alarm     High Fall Risk: Yes    Length of Stay:  Expected LOS: - - -  Actual LOS: 0      Jermaine Colvin RN

## 2022-06-07 NOTE — TELEPHONE ENCOUNTER
----- Message from Felicita Castano MD sent at 6/6/2022  9:15 PM EDT -----  Lab results revealed elevated free light chains. I am referring patient to hematology for their opinion.

## 2022-06-07 NOTE — H&P
Hospitalist Admission Note    NAME: Raina Gómez   :  1980   MRN:  [id number]     Date/Time:  2022 2:39 PM    Patient PCP: Stone Smith MD  ______________________________________________________________________  Given the patient's current clinical presentation, I have a high level of concern for decompensation if discharged from the emergency department. Complex decision making was performed, which includes reviewing the patient's available past medical records, laboratory results, and x-ray films. My assessment of this patient's clinical condition and my plan of care is as follows. 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 FS every hour until out of DKA. Close monitoring of electrolytes/AG and bicarb. No indication for bicarbonate at present since Ph 7.24. Supplement electrolytes abnormalities aggressively. Aggressive hydration with NS for now --- 1st L of IVF just started. Change to D5W1/2NS once FS <=200  Diet: CLD     Chest pain  DDX: ro cardiac vs mague irma tears vs esophagitis   cxray negative   No ECG or troponin done yet, will order   C/w PPI IV bid in case related to   Check covid     Leukocytosis  Likely reactive in the region, no fever or chills  Chest x-ray clear  UA is pending but he is asymptomatic  Lactic acid normal   Check procalcitonin   Hx Emphysematous cystitis with Klebsiella pneumoniae, POA with Recurrent Klebsiella pneumoniae uti 2022 and 3/2022   --- FU UA   Addendum: procalcitonin high, check lactic acid, BC; start empiric AB; Temp low  Trop 10, will repeat  ; ECG with non specific ST-T changes     FELECIA on CKD 3b  Cr at baseline ~ 2.1-2.5, admission 4.26  Aggressive hydration   Hx urinary retention in the pst requiring Beard, monitor   monitor closely. Avoid nephrotoxic drugs, adjust all meds to GFR.      Anemia of chronic disease: Hemoglobin stable, monitor  BPH: cont flomax,  finasteride  HTN: BP soft, hold coreg, amlodipine  Hyperlipidemia: crestor    DM neuropathy  18.5 - 24.9 Normal weight / Body mass index is 23.04 kg/m².           Code status: Full  Prophylaxis: Hep SQ    Baseline: lives with his mother, independent       Subjective:   CHIEF COMPLAINT: Nausea vomiting    HISTORY OF PRESENT ILLNESS:     Yulissa Adrian is a 46 y.o.  male who presents with above complaints. Patient started with intractable nausea and vomiting 2 days ago. He had multiple episodes of vomiting at home. He was able to keep only minimal amount of water in. He also reported pressure-like midsternal chest pain which started together with vomiting, pain is nonradiating, denies shortness of breath. He admits to dry cough started with chest pain. He stated, I was coughing and vomiting, coughing and vomiting. ...... he denies any fever or chills. No abdominal pain. No diarrhea or constipation. He reports normal BM 2 days ago. He denies dysuria or hematuria. He has history of multiple hospitalizations for DKA. Blood work revealed in ED DKA again. He has history of being noncompliant with his insulin, but stated today that he was taking his insulin as he supposed to this time. He denies any difficulties with urination now, stated \" I am peeing ok\". He reports some blood tinged vomiting at the end. We were asked to admit for work up and evaluation of the above problems.      Past Medical History:   Diagnosis Date    [facility]     Bipolar disorder [facility]     Depression     Diabetes ([facility]     DKA, type 1 ([facility] 1/27/2013    diagnosed age 23    H/O noncompliance with medical treatment, presenting hazards to health     MRSA (methicillin resistant staph aureus) culture positive     MRSA (methicillin resistant Staphylococcus aureus)     Face    Noncompliance with medication regimen     Second hand smoke exposure     [facility]     Seizures ([facility] 2006 or 2007    one episode during prison        Past Surgical History:   Procedure Laterality Date    HX HEENT      top left wisdom tooth    HX ORTHOPAEDIC Left     wrist; [facility]    UPPER GI ENDOSCOPY,BIOPSY  2018            Social History     Tobacco Use    Smoking status: Former Smoker     Packs/day: 0.10     Years: 16.00     Pack years: 1.60     Types: Cigarettes     Start date: 10/4/1999     Quit date: 2020     Years since quittin.2    Smokeless tobacco: Never Used   Substance Use Topics    Alcohol use: No        Family History   Problem Relation Age of Onset    Diabetes Mother     Diabetes Other         neice, type 1      No Known Allergies     Prior to Admission medications    Medication Sig Start Date End Date Taking? Authorizing Provider   rosuvastatin (CRESTOR) 20 mg tablet Take 1 Tablet by mouth nightly. 22   Joanna Robbins MD   oxyCODONE IR (Roxicodone) 5 mg immediate release tablet Take 1 Tablet by mouth nightly as needed for Pain for up to 30 days. Max Daily Amount: 5 mg. 22  Joanna Robbins MD   amLODIPine (NORVASC) 5 mg tablet Take 1 Tablet by mouth daily. 22   Joanna Robbins MD   carvediloL (COREG) 12.5 mg tablet Take 1 Tablet by mouth two (2) times daily (with meals). 22   Joanna Robbins MD   furosemide (LASIX) 20 mg tablet Take 1 Tablet by mouth daily. 22   Joanna Robbins MD   clotrimazole (LOTRIMIN) 1 % topical cream Apply  to affected area two (2) times a day for 30 days. Apply twice a day for 2-4 weeks. Apply until symptoms resolve. 22  Dwane Cushing, MD   pirocin [facility]) 2 % ointment Apply  to affected area daily. Apply until symptoms resolve 22   Dwane Cushing, MD   insulin glargine (LANTUS) 100 unit/mL injection 16 Units by SubCUTAneous route daily.  22   Sancho Peguero MD   tamsulosin [facility]) 0.4 mg capsule Take 0.4 mg by mouth daily.    Provider, Historical   insulin aspart U-100 (NovoLOG Flexpen U-100 Insulin) 100 unit/mL (3 mL) inpn 2 Units by SubCUTAneous route Before breakfast, lunch, and dinner. 3/12/22   Haritha Cook MD   naloxone (Narcan) 4 mg/actuation nasal spray Use 1 spray intranasally, then discard. Repeat with new spray every 2 min as needed for opioid overdose symptoms, alternating nostrils. 2/23/22   Zulema Cee MD   pen needle, diabetic 30 gauge x 3/16\" ndle 5 Units by Does Not Apply route Before breakfast, lunch, and dinner. 2/23/22   Zulema Cee MD   finasteride (PROSCAR) 5 mg tablet Take 1 Tablet by mouth daily. 2/15/22   Foreign Davison MD   ondansetron (ZOFRAN ODT) 4 mg disintegrating tablet Take 1 Tablet by mouth every eight (8) hours as needed for Nausea or Vomiting. 12/28/21   Araceli Roldan MD   DULoxetine (CYMBALTA) 60 mg capsule Take 1 Capsule by mouth daily. 12/13/21   Zulema Cee MD   pantoprazole (Protonix) 40 mg tablet Take 1 Tablet by mouth daily. 12/6/21   Ced Solano MD   ergocalciferol (ERGOCALCIFEROL) 1,250 mcg (50,000 unit) capsule Take 1 capsule by mouth once a week  Patient not taking: Reported on 5/10/2022 10/30/21   Zulema Cee MD   pregabalin (Lyrica) 75 mg capsule Take 75 mg by mouth two (2) times a day. Provider, Historical       REVIEW OF SYSTEMS:     I am not able to complete the review of systems because:    The patient is intubated and sedated    The patient has altered mental status due to his acute medical problems    The patient has baseline aphasia from prior stroke(s)    The patient has baseline dementia and is not reliable historian    The patient is in acute medical distress and unable to provide information           Total of 12 systems reviewed as follows:       POSITIVE= underlined text  Negative = text not underlined  General:  fever, chills, sweats, generalized weakness, weight loss/gain,      loss of appetite   Eyes: blurred vision, eye pain, loss of vision, double vision  ENT:    rhinorrhea, pharyngitis   Respiratory:   cough, sputum production, SOB, [place], wheezing, pleuritic pain   Cardiology:   chest pain, palpitations, orthopnea, PND, edema, syncope   Gastrointestinal:  abdominal pain , N/V, diarrhea, dysphagia, constipation, bleeding   Genitourinary:  frequency, urgency, dysuria, hematuria, incontinence   Muskuloskeletal :  arthralgia, myalgia, back pain  Hematology:  easy bruising, nose or gum bleeding, lymphadenopathy   Dermatological: rash, ulceration, pruritis, color change / jaundice  Endocrine:   hot flashes or polydipsia   Neurological:  headache, dizziness, confusion, focal weakness, paresthesia,     Speech difficulties, memory loss, gait difficulty  Psychological: Feelings of anxiety, depression, agitation    Objective:   VITALS:    Visit Vitals  BP (!) 142/73   Pulse 87   Temp 97.2 °F (36.2 °C)   Resp 19   Ht 5' 9\" (1.753 m)   Wt 72.6 kg (160 lb)   SpO2 100%   BMI 23.63 kg/m²       PHYSICAL EXAM:    General:    Alert, cooperative, no distress, appears stated age. HEENT: Atraumatic, anicteric sclerae, pink conjunctivae     No oral ulcers, mucosa moist, throat clear, dentition fair  Neck:  Supple, symmetrical,  thyroid: non tender  Lungs:   Clear to auscultation bilaterally. No Wheezing or Rhonchi. No rales. Chest wall:  No tenderness  No Accessory muscle use. Heart:   Regular  rhythm,  No  murmur   No edema  Abdomen:   Soft, non-tender. Not distended. Bowel sounds normal  Extremities: No cyanosis. No clubbing,      Skin turgor normal, Capillary refill normal, Radial dial pulse 2+  Skin:     Not pale. Not Jaundiced  No rashes   Psych:  Good insight. Not depressed. Not anxious or agitated. Neurologic: EOMs intact. No facial asymmetry. No aphasia or slurred speech. Symmetrical strength, Sensation grossly intact. Alert and oriented X 4. _______________________________________________________________________  Care Plan discussed with:    Comments   Patient y    Family      RN y    Care Manager                    Consultant:  y    _______________________________________________________________________  Expected  Disposition:   Home with Family yy   HH/PT/OT/RN    SNF/LTC    CATHIE    ________________________________________________________________________  TOTAL TIME:  Minutes    Critical Care Provided  62   Minutes non procedure based      Comments    y Reviewed previous records   >50% of visit spent in counseling and coordination of care y Discussion with patient and/or family and questions answered       ________________________________________________________________________  Signed: Alejandra Hutchinson MD    Procedures: see electronic medical records for all procedures/Xrays and details which were not copied into this note but were reviewed prior to creation of Plan.     LAB DATA REVIEWED:    Recent Results (from the past 24 hour(s))   GLUCOSE, POC    Collection Time: 06/07/22 12:01 PM   Result Value Ref Range    Glucose (POC) >600 (HH) 65 - 117 mg/dL    Performed by [address], POC    Collection Time: 06/07/22 12:02 PM   Result Value Ref Range    Glucose (POC) >600 (HH) 65 - 117 mg/dL    Performed by [address], POC    Collection Time: 06/07/22  1:35 PM   Result Value Ref Range    Glucose (POC) >600 (HH) 65 - 117 mg/dL    Performed by [facility]    BLOOD GAS,CHEM8,LACTIC ACID POC    Collection Time: 06/07/22  1:37 PM   Result Value Ref Range    Calcium, ionized (POC) 1.01 (L) 1.12 - 1.32 mmol/L    BICARBONATE 12 mmol/L    Base deficit (POC) 14.2 mmol/L    Sample source VENOUS BLOOD      CO2, POC 12 (L) 19 - 24 MMOL/L    Sodium,  (L) 136 - 145 MMOL/L    Potassium, POC 4.3 3.5 - 5.5 MMOL/L    Chloride, POC 95 (L) 100 - 108 MMOL/L    Glucose, POC >700 (HH) 74 - 106 MG/DL    Creatinine, POC 3.6 (H) 0.6 - 1.3 MG/DL    Lactic Acid (POC) 1.82 0.40 - 2.00 mmol/L    pH, venous (POC) 7.24 (L) 7.32 - 7.42      pCO2, venous (POC) 28.1 (L) 41 - 51 MMHG    pO2, venous (POC) 48 (H) 25 - 40 mmHg   CBC WITH AUTOMATED DIFF    Collection Time: 06/07/22  1:40 PM   Result Value Ref Range    WBC 18.1 (H) 4.1 - 11.1 K/uL    RBC 3.51 (L) 4.10 - 5.70 M/uL    HGB 10.1 (L) 12.1 - 17.0 g/dL    HCT 31.1 (L) 36.6 - 50.3 %    MCV 88.6 80.0 - 99.0 FL    MCH 28.8 26.0 - 34.0 PG    MCHC 32.5 30.0 - 36.5 g/dL    RDW 14.8 (H) 11.5 - 14.5 %    PLATELET 009 (H) 431 - 400 K/uL    MPV 11.3 8.9 - 12.9 FL    NRBC 0.0 0  WBC    ABSOLUTE NRBC 0.00 0.00 - 0.01 K/uL    NEUTROPHILS 89 (H) 32 - 75 %    LYMPHOCYTES 7 (L) 12 - 49 %    MONOCYTES 4 (L) 5 - 13 %    EOSINOPHILS 0 0 - 7 %    BASOPHILS 0 0 - 1 %    IMMATURE GRANULOCYTES 0 0.0 - 0.5 %    ABS. NEUTROPHILS 16.1 (H) 1.8 - 8.0 K/UL    ABS. LYMPHOCYTES 1.2 0.8 - 3.5 K/UL    ABS. MONOCYTES 0.6 0.0 - 1.0 K/UL    ABS. EOSINOPHILS 0.1 0.0 - 0.4 K/UL    ABS. BASOPHILS 0.0 0.0 - 0.1 K/UL    ABS. IMM.  GRANS. 0.0 0.00 - 0.04 K/UL    DF AUTOMATED

## 2022-06-07 NOTE — ED NOTES
TRANSITION OF CARE - SBAR OUT    Patient is being transferred to [place] [id number] [facility], Room# 2243. Report GIVEN TO Latrice Cee RN on [facility] for routine progression of care. Report is consisted of the following information SBAR, ED Summary, MAR and Recent Results. Patient transferred to receiving unit by: RN from PCU (RN or Tech Name)     Called outstanding consults: Yes   Collected routine labs: Yes     All current orders reviewed with accepting nurse: Yes    The following personal items will be sent with the patient during transfer to the floor: All valuables:                          CARDIAC MONITORING ORDERED: Yes     The following CURRENT information were reported to the receiving RN:    CODE STATUS: Full Code    NIH SCORE:    THAIS SCREENING:      NEURO ASSESSMENT:        RESTRAINTS IN USE: No      IS DOCUMENTATION COMPLETE: Yes      Vital Signs  Level of Consciousness: Alert (0) (06/07/22 1720)  Temp: (!) 91.4 °F (33 °C) (MD madrigal. ) (06/07/22 1720)  Temp Source: Rectal (06/07/22 1720)  Pulse (Heart Rate): 74 (06/07/22 1700)  Resp Rate: 21 (06/07/22 1700)  BP: (!) 160/94 (06/07/22 1700)  MAP (Monitor): 113 (06/07/22 1700)  MAP (Calculated): 116 (06/07/22 1700)  MEWS Score: 2 (06/07/22 1156)  Pain 1  Pain Scale 1: Numeric (0 - 10) (06/07/22 1156)  Pain Intensity 1: 10 (06/07/22 1156)  Patient Stated Pain Goal: 0 (06/07/22 1156)      OXYGEN: Oxygen Therapy  O2 Device: None (Room air) (06/07/22 1156)    JAIME FALL RISK:        WOUNDS: No      URINARY CATHETER: voiding    LINE ACCESS:   Peripheral IV Left Antecubital (Active)        Opportunity for questions and clarification were provided.   Chi Pierce RN

## 2022-06-08 LAB
ADMINISTERED INITIALS, ADMINIT: NORMAL
ALBUMIN SERPL-MCNC: 1.7 G/DL (ref 3.5–5)
ALBUMIN/GLOB SERPL: 0.6 {RATIO} (ref 1.1–2.2)
ALP SERPL-CCNC: 330 U/L (ref 45–117)
ALT SERPL-CCNC: 78 U/L (ref 12–78)
ANION GAP SERPL CALC-SCNC: 13 MMOL/L (ref 5–15)
ANION GAP SERPL CALC-SCNC: 14 MMOL/L (ref 5–15)
ANION GAP SERPL CALC-SCNC: 17 MMOL/L (ref 5–15)
AST SERPL-CCNC: 35 U/L (ref 15–37)
ATRIAL RATE: 72 BPM
BASOPHILS # BLD: 0 K/UL (ref 0–0.1)
BASOPHILS NFR BLD: 0 % (ref 0–1)
BILIRUB SERPL-MCNC: 0.3 MG/DL (ref 0.2–1)
BUN SERPL-MCNC: 64 MG/DL (ref 6–20)
BUN SERPL-MCNC: 67 MG/DL (ref 6–20)
BUN SERPL-MCNC: 71 MG/DL (ref 6–20)
BUN/CREAT SERPL: 19 (ref 12–20)
BUN/CREAT SERPL: 19 (ref 12–20)
BUN/CREAT SERPL: 20 (ref 12–20)
CALCIUM SERPL-MCNC: 7.1 MG/DL (ref 8.5–10.1)
CALCIUM SERPL-MCNC: 7.5 MG/DL (ref 8.5–10.1)
CALCIUM SERPL-MCNC: 7.6 MG/DL (ref 8.5–10.1)
CALCULATED P AXIS, ECG09: 61 DEGREES
CALCULATED R AXIS, ECG10: 59 DEGREES
CALCULATED T AXIS, ECG11: 29 DEGREES
CHLORIDE SERPL-SCNC: 86 MMOL/L (ref 97–108)
CHLORIDE SERPL-SCNC: 92 MMOL/L (ref 97–108)
CHLORIDE SERPL-SCNC: 93 MMOL/L (ref 97–108)
CO2 SERPL-SCNC: 21 MMOL/L (ref 21–32)
CO2 SERPL-SCNC: 22 MMOL/L (ref 21–32)
CO2 SERPL-SCNC: 23 MMOL/L (ref 21–32)
CREAT SERPL-MCNC: 3.4 MG/DL (ref 0.7–1.3)
CREAT SERPL-MCNC: 3.4 MG/DL (ref 0.7–1.3)
CREAT SERPL-MCNC: 3.74 MG/DL (ref 0.7–1.3)
D50 ADMINISTERED, D50ADM: 0 ML
D50 ORDER, D50ORD: 0 ML
DIAGNOSIS, 93000: NORMAL
DIFFERENTIAL METHOD BLD: ABNORMAL
EOSINOPHIL # BLD: 0.1 K/UL (ref 0–0.4)
EOSINOPHIL NFR BLD: 1 % (ref 0–7)
ERYTHROCYTE [DISTWIDTH] IN BLOOD BY AUTOMATED COUNT: 13.6 % (ref 11.5–14.5)
GLOBULIN SER CALC-MCNC: 3 G/DL (ref 2–4)
GLSCOM COMMENTS: NORMAL
GLUCOSE BLD STRIP.AUTO-MCNC: 113 MG/DL (ref 65–117)
GLUCOSE BLD STRIP.AUTO-MCNC: 151 MG/DL (ref 65–117)
GLUCOSE BLD STRIP.AUTO-MCNC: 161 MG/DL (ref 65–117)
GLUCOSE BLD STRIP.AUTO-MCNC: 164 MG/DL (ref 65–117)
GLUCOSE BLD STRIP.AUTO-MCNC: 175 MG/DL (ref 65–117)
GLUCOSE BLD STRIP.AUTO-MCNC: 210 MG/DL (ref 65–117)
GLUCOSE BLD STRIP.AUTO-MCNC: 214 MG/DL (ref 65–117)
GLUCOSE BLD STRIP.AUTO-MCNC: 73 MG/DL (ref 65–117)
GLUCOSE BLD STRIP.AUTO-MCNC: 90 MG/DL (ref 65–117)
GLUCOSE BLD STRIP.AUTO-MCNC: 91 MG/DL (ref 65–117)
GLUCOSE BLD STRIP.AUTO-MCNC: 91 MG/DL (ref 65–117)
GLUCOSE BLD STRIP.AUTO-MCNC: 98 MG/DL (ref 65–117)
GLUCOSE BLD STRIP.AUTO-MCNC: 99 MG/DL (ref 65–117)
GLUCOSE SERPL-MCNC: 193 MG/DL (ref 65–100)
GLUCOSE SERPL-MCNC: 210 MG/DL (ref 65–100)
GLUCOSE SERPL-MCNC: 84 MG/DL (ref 65–100)
GLUCOSE, GLC: 113 MG/DL
GLUCOSE, GLC: 164 MG/DL
GLUCOSE, GLC: 175 MG/DL
GLUCOSE, GLC: 210 MG/DL
GLUCOSE, GLC: 214 MG/DL
GLUCOSE, GLC: 91 MG/DL
GLUCOSE, GLC: 98 MG/DL
GLUCOSE, GLC: 99 MG/DL
HCT VFR BLD AUTO: 25.7 % (ref 36.6–50.3)
HGB BLD-MCNC: 9.1 G/DL (ref 12.1–17)
HIGH TARGET, HITG: 250 MG/DL
IMM GRANULOCYTES # BLD AUTO: 0.1 K/UL (ref 0–0.04)
IMM GRANULOCYTES NFR BLD AUTO: 0 % (ref 0–0.5)
INSULIN ADMINSTERED, INSADM: 0.3 UNITS/HOUR
INSULIN ADMINSTERED, INSADM: 0.6 UNITS/HOUR
INSULIN ADMINSTERED, INSADM: 0.6 UNITS/HOUR
INSULIN ADMINSTERED, INSADM: 0.8 UNITS/HOUR
INSULIN ADMINSTERED, INSADM: 1.1 UNITS/HOUR
INSULIN ADMINSTERED, INSADM: 1.4 UNITS/HOUR
INSULIN ADMINSTERED, INSADM: 2.1 UNITS/HOUR
INSULIN ADMINSTERED, INSADM: 7.3 UNITS/HOUR
INSULIN ORDER, INSORD: 0.3 UNITS/HOUR
INSULIN ORDER, INSORD: 0.6 UNITS/HOUR
INSULIN ORDER, INSORD: 0.6 UNITS/HOUR
INSULIN ORDER, INSORD: 0.8 UNITS/HOUR
INSULIN ORDER, INSORD: 1.1 UNITS/HOUR
INSULIN ORDER, INSORD: 1.4 UNITS/HOUR
INSULIN ORDER, INSORD: 2.1 UNITS/HOUR
INSULIN ORDER, INSORD: 7.3 UNITS/HOUR
LACTATE SERPL-SCNC: 0.9 MMOL/L (ref 0.4–2)
LACTATE SERPL-SCNC: 5 MMOL/L (ref 0.4–2)
LOW TARGET, LOT: 150 MG/DL
LYMPHOCYTES # BLD: 2.4 K/UL (ref 0.8–3.5)
LYMPHOCYTES NFR BLD: 17 % (ref 12–49)
MAGNESIUM SERPL-MCNC: 2.3 MG/DL (ref 1.6–2.4)
MCH RBC QN AUTO: 28.8 PG (ref 26–34)
MCHC RBC AUTO-ENTMCNC: 35.4 G/DL (ref 30–36.5)
MCV RBC AUTO: 81.3 FL (ref 80–99)
MINUTES UNTIL NEXT BG, NBG: 60 MIN
MONOCYTES # BLD: 1.2 K/UL (ref 0–1)
MONOCYTES NFR BLD: 9 % (ref 5–13)
MULTIPLIER, MUL: 0.01
MULTIPLIER, MUL: 0.03
MULTIPLIER, MUL: 0.04
MULTIPLIER, MUL: 0.04
MULTIPLIER, MUL: 0.06
MULTIPLIER, MUL: 0.07
NEUTS SEG # BLD: 9.8 K/UL (ref 1.8–8)
NEUTS SEG NFR BLD: 72 % (ref 32–75)
NRBC # BLD: 0 K/UL (ref 0–0.01)
NRBC BLD-RTO: 0 PER 100 WBC
ORDER INITIALS, ORDINIT: NORMAL
P-R INTERVAL, ECG05: 138 MS
PHOSPHATE SERPL-MCNC: 4.4 MG/DL (ref 2.6–4.7)
PLATELET # BLD AUTO: 470 K/UL (ref 150–400)
PMV BLD AUTO: 10.2 FL (ref 8.9–12.9)
POTASSIUM SERPL-SCNC: 3.5 MMOL/L (ref 3.5–5.1)
POTASSIUM SERPL-SCNC: 3.6 MMOL/L (ref 3.5–5.1)
POTASSIUM SERPL-SCNC: 3.6 MMOL/L (ref 3.5–5.1)
PROT SERPL-MCNC: 4.7 G/DL (ref 6.4–8.2)
Q-T INTERVAL, ECG07: 458 MS
QRS DURATION, ECG06: 108 MS
QTC CALCULATION (BEZET), ECG08: 501 MS
RBC # BLD AUTO: 3.16 M/UL (ref 4.1–5.7)
SERVICE CMNT-IMP: ABNORMAL
SERVICE CMNT-IMP: NORMAL
SODIUM SERPL-SCNC: 124 MMOL/L (ref 136–145)
SODIUM SERPL-SCNC: 128 MMOL/L (ref 136–145)
SODIUM SERPL-SCNC: 129 MMOL/L (ref 136–145)
TROPONIN-HIGH SENSITIVITY: 11 NG/L (ref 0–76)
VENTRICULAR RATE, ECG03: 72 BPM
WBC # BLD AUTO: 13.5 K/UL (ref 4.1–11.1)

## 2022-06-08 PROCEDURE — 83735 ASSAY OF MAGNESIUM: CPT

## 2022-06-08 PROCEDURE — 74011250637 HC RX REV CODE- 250/637: Performed by: GENERAL ACUTE CARE HOSPITAL

## 2022-06-08 PROCEDURE — 36415 COLL VENOUS BLD VENIPUNCTURE: CPT

## 2022-06-08 PROCEDURE — 83036 HEMOGLOBIN GLYCOSYLATED A1C: CPT

## 2022-06-08 PROCEDURE — 74011636637 HC RX REV CODE- 636/637: Performed by: GENERAL ACUTE CARE HOSPITAL

## 2022-06-08 PROCEDURE — 74011000258 HC RX REV CODE- 258: Performed by: HOSPITALIST

## 2022-06-08 PROCEDURE — 84100 ASSAY OF PHOSPHORUS: CPT

## 2022-06-08 PROCEDURE — 74011250637 HC RX REV CODE- 250/637: Performed by: HOSPITALIST

## 2022-06-08 PROCEDURE — 83605 ASSAY OF LACTIC ACID: CPT

## 2022-06-08 PROCEDURE — 65270000046 HC RM TELEMETRY

## 2022-06-08 PROCEDURE — 82962 GLUCOSE BLOOD TEST: CPT

## 2022-06-08 PROCEDURE — 85025 COMPLETE CBC W/AUTO DIFF WBC: CPT

## 2022-06-08 PROCEDURE — 74011250636 HC RX REV CODE- 250/636: Performed by: HOSPITALIST

## 2022-06-08 PROCEDURE — 74011000258 HC RX REV CODE- 258: Performed by: NURSE PRACTITIONER

## 2022-06-08 PROCEDURE — 80053 COMPREHEN METABOLIC PANEL: CPT

## 2022-06-08 PROCEDURE — 74011000250 HC RX REV CODE- 250: Performed by: HOSPITALIST

## 2022-06-08 PROCEDURE — 74011250636 HC RX REV CODE- 250/636: Performed by: NURSE PRACTITIONER

## 2022-06-08 RX ORDER — PANTOPRAZOLE SODIUM 40 MG/1
40 TABLET, DELAYED RELEASE ORAL
Status: DISCONTINUED | OUTPATIENT
Start: 2022-06-09 | End: 2022-06-09 | Stop reason: HOSPADM

## 2022-06-08 RX ORDER — INSULIN LISPRO 100 [IU]/ML
INJECTION, SOLUTION INTRAVENOUS; SUBCUTANEOUS
Status: DISCONTINUED | OUTPATIENT
Start: 2022-06-08 | End: 2022-06-09 | Stop reason: HOSPADM

## 2022-06-08 RX ORDER — CARVEDILOL 12.5 MG/1
12.5 TABLET ORAL 2 TIMES DAILY WITH MEALS
Status: DISCONTINUED | OUTPATIENT
Start: 2022-06-08 | End: 2022-06-09 | Stop reason: HOSPADM

## 2022-06-08 RX ORDER — MAGNESIUM SULFATE 100 %
4 CRYSTALS MISCELLANEOUS AS NEEDED
Status: DISCONTINUED | OUTPATIENT
Start: 2022-06-08 | End: 2022-06-09 | Stop reason: HOSPADM

## 2022-06-08 RX ORDER — ROSUVASTATIN CALCIUM 10 MG/1
10 TABLET, COATED ORAL
Status: DISCONTINUED | OUTPATIENT
Start: 2022-06-08 | End: 2022-06-09

## 2022-06-08 RX ORDER — POTASSIUM CHLORIDE 7.45 MG/ML
10 INJECTION INTRAVENOUS
Status: COMPLETED | OUTPATIENT
Start: 2022-06-08 | End: 2022-06-08

## 2022-06-08 RX ORDER — INSULIN GLARGINE 100 [IU]/ML
16 INJECTION, SOLUTION SUBCUTANEOUS DAILY
Status: DISCONTINUED | OUTPATIENT
Start: 2022-06-08 | End: 2022-06-09 | Stop reason: HOSPADM

## 2022-06-08 RX ORDER — DEXTROSE MONOHYDRATE AND SODIUM CHLORIDE 5; .9 G/100ML; G/100ML
50 INJECTION, SOLUTION INTRAVENOUS CONTINUOUS
Status: DISCONTINUED | OUTPATIENT
Start: 2022-06-08 | End: 2022-06-09 | Stop reason: HOSPADM

## 2022-06-08 RX ORDER — DEXTROSE MONOHYDRATE 100 MG/ML
0-250 INJECTION, SOLUTION INTRAVENOUS AS NEEDED
Status: DISCONTINUED | OUTPATIENT
Start: 2022-06-08 | End: 2022-06-09 | Stop reason: HOSPADM

## 2022-06-08 RX ORDER — INSULIN LISPRO 100 [IU]/ML
4 INJECTION, SOLUTION INTRAVENOUS; SUBCUTANEOUS
Status: DISCONTINUED | OUTPATIENT
Start: 2022-06-08 | End: 2022-06-09 | Stop reason: HOSPADM

## 2022-06-08 RX ADMIN — HEPARIN SODIUM 5000 UNITS: 5000 INJECTION INTRAVENOUS; SUBCUTANEOUS at 21:00

## 2022-06-08 RX ADMIN — HEPARIN SODIUM 5000 UNITS: 5000 INJECTION INTRAVENOUS; SUBCUTANEOUS at 14:27

## 2022-06-08 RX ADMIN — CEFEPIME HYDROCHLORIDE 1 G: 1 INJECTION, POWDER, FOR SOLUTION INTRAMUSCULAR; INTRAVENOUS at 23:50

## 2022-06-08 RX ADMIN — DEXTROSE AND SODIUM CHLORIDE 125 ML/HR: 5; 900 INJECTION, SOLUTION INTRAVENOUS at 08:28

## 2022-06-08 RX ADMIN — VANCOMYCIN HYDROCHLORIDE 1750 MG: 10 INJECTION, POWDER, LYOPHILIZED, FOR SOLUTION INTRAVENOUS at 00:00

## 2022-06-08 RX ADMIN — CARVEDILOL 12.5 MG: 12.5 TABLET, FILM COATED ORAL at 14:27

## 2022-06-08 RX ADMIN — HEPARIN SODIUM 5000 UNITS: 5000 INJECTION INTRAVENOUS; SUBCUTANEOUS at 05:00

## 2022-06-08 RX ADMIN — SODIUM CHLORIDE, PRESERVATIVE FREE 2 G: 5 INJECTION INTRAVENOUS at 00:32

## 2022-06-08 RX ADMIN — Medication 4 UNITS: at 18:12

## 2022-06-08 RX ADMIN — POTASSIUM CHLORIDE 10 MEQ: 10 INJECTION, SOLUTION INTRAVENOUS at 03:49

## 2022-06-08 RX ADMIN — OXYCODONE 5 MG: 5 TABLET ORAL at 04:50

## 2022-06-08 RX ADMIN — Medication 4 UNITS: at 12:58

## 2022-06-08 RX ADMIN — DEXTROSE AND SODIUM CHLORIDE 125 ML/HR: 5; 900 INJECTION, SOLUTION INTRAVENOUS at 00:09

## 2022-06-08 RX ADMIN — POTASSIUM CHLORIDE 10 MEQ: 10 INJECTION, SOLUTION INTRAVENOUS at 06:00

## 2022-06-08 RX ADMIN — SODIUM CHLORIDE 250 ML: 9 INJECTION, SOLUTION INTRAVENOUS at 03:48

## 2022-06-08 RX ADMIN — INSULIN GLARGINE 16 UNITS: 100 INJECTION, SOLUTION SUBCUTANEOUS at 12:58

## 2022-06-08 RX ADMIN — ROSUVASTATIN CALCIUM 10 MG: 10 TABLET, COATED ORAL at 21:00

## 2022-06-08 RX ADMIN — ONDANSETRON 4 MG: 2 INJECTION INTRAMUSCULAR; INTRAVENOUS at 20:48

## 2022-06-08 RX ADMIN — Medication 3 UNITS: at 18:12

## 2022-06-08 RX ADMIN — PREGABALIN 75 MG: 75 CAPSULE ORAL at 18:12

## 2022-06-08 RX ADMIN — OXYCODONE 5 MG: 5 TABLET ORAL at 20:49

## 2022-06-08 RX ADMIN — FINASTERIDE 5 MG: 5 TABLET, FILM COATED ORAL at 09:37

## 2022-06-08 RX ADMIN — VANCOMYCIN HYDROCHLORIDE 750 MG: 750 INJECTION, POWDER, LYOPHILIZED, FOR SOLUTION INTRAVENOUS at 22:43

## 2022-06-08 RX ADMIN — TAMSULOSIN HYDROCHLORIDE 0.4 MG: 0.4 CAPSULE ORAL at 09:37

## 2022-06-08 RX ADMIN — PREGABALIN 75 MG: 75 CAPSULE ORAL at 09:37

## 2022-06-08 RX ADMIN — CEFEPIME HYDROCHLORIDE 1 G: 1 INJECTION, POWDER, FOR SOLUTION INTRAMUSCULAR; INTRAVENOUS at 14:27

## 2022-06-08 RX ADMIN — DULOXETINE HYDROCHLORIDE 60 MG: 30 CAPSULE, DELAYED RELEASE ORAL at 09:37

## 2022-06-08 RX ADMIN — OXYCODONE 5 MG: 5 TABLET ORAL at 09:42

## 2022-06-08 NOTE — PROGRESS NOTES
Received notification from bedside RN about patient with regards to: last BG reading 164, needs D5 to be incorporated ton current IV per DKA protocol  VS: /66, HR 96, RR 28, O2 sat 100% on RA    Intervention given: Changed NS to D5 NS at same rate of 125 ml/hour    0040: Notified of Lactic 5.0, noted K+ 3.5    - KCl 10 meq x 2 doses,  ml IV bolus x 1 dose, repeat lactic in 6 hours ordered

## 2022-06-08 NOTE — PROGRESS NOTES
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 Diet: CLD      Chest pain  DDX: ro cardiac vs mague irma tears vs esophagitis   cxray negative   ECG or troponin neg  C/w PPI IV bid in case related to      Leukocytosis  Likely reactive in the region, no fever or chills  Chest x-ray clear  UA is neg and he is asymptomatic  Hx Emphysematous cystitis with Klebsiella pneumoniae, POA with Recurrent Klebsiella pneumoniae uti 2022 and 3/2022   --- FU UA   Addendum: procalcitonin high, lactic acidosis, BC obtained; start empiric AB; Temp low, DC Abx if Cx are neg     FELECIA on CKD 3b  Cr at baseline ~ 1.6-2.1, admission 4.26  Lower hydration given leg edema   Hx urinary retention in the pst requiring Beard, monitor   monitor closely. Avoid nephrotoxic drugs, adjust all meds to GFR.      Anemia of chronic disease: Hemoglobin stable, monitor  BPH: cont flomax,  finasteride  HTN: BP soft, hold coreg, amlodipine  Hyperlipidemia: crestor    DM neuropathy  18.5 - 24.9 Normal weight / Body mass index is 23.04 kg/m².   Code status: Full  Prophylaxis: Hep SQ    Baseline: lives with his mother, independent   Recommended Disposition: Home w/Family  Anticipated Discharge Date: 48 hours        Subjective:     Discussed with RN events overnight.    Denies any specific complaints other that feeling tired and sleepy    Review of Systems:  Symptom Y/N Comments  Symptom Y/N Comments   Fever/Chills    Chest Pain     Poor Appetite    Edema     Cough    Abdominal Pain     Sputum    Joint Pain     SOB/JOHNSTON    Pruritis/Rash     Nausea/vomit    Tolerating PT/OT     Diarrhea    Tolerating Diet     Constipation Other       PO intake: No data found. Wt Readings from Last 10 Encounters:   06/07/22 72.6 kg (160 lb)   06/02/22 73.9 kg (163 lb)   05/19/22 77.1 kg (170 lb)   05/17/22 76.1 kg (167 lb 12.3 oz)   05/08/22 70.8 kg (156 lb 1.4 oz)   03/08/22 70.8 kg (156 lb)   03/03/22 61.2 kg (134 lb 14.7 oz)   02/24/22 70.3 kg (155 lb)   02/23/22 70.7 kg (155 lb 12.8 oz)   02/08/22 66.2 kg (146 lb)       Objective:     VITALS:   Last 24hrs VS reviewed since prior progress note. Most recent are:  Patient Vitals for the past 24 hrs:   Temp Pulse Resp BP SpO2   06/08/22 1052 97.7 °F (36.5 °C) 84 12 (!) 164/84 97 %   06/08/22 0740 98.8 °F (37.1 °C) 85 17 (!) 166/82 96 %   06/08/22 0400 98.8 °F (37.1 °C) 87 18 (!) 150/75 95 %   06/08/22 0200 -- 94 18 (!) 151/79 100 %   06/08/22 0004 -- 93 -- (!) 149/78 100 %   06/07/22 2241 98.1 °F (36.7 °C) 96 28 125/66 100 %   06/07/22 2100 98.9 °F (37.2 °C) 90 21 (!) 152/78 100 %   06/07/22 2049 -- -- -- -- 97 %   06/07/22 1930 (!) 94.8 °F (34.9 °C) 78 20 (!) 140/81 100 %   06/07/22 1802 (!) 92 °F (33.3 °C) 74 18 (!) 142/77 100 %   06/07/22 1720 (!) 91.4 °F (33 °C) -- -- -- --   06/07/22 1700 -- 74 21 (!) 160/94 100 %   06/07/22 1615 -- 79 26 (!) 140/91 100 %   06/07/22 1540 -- 87 25 132/71 100 %   06/07/22 1530 -- 86 20 -- 98 %   06/07/22 1330 -- 89 16 -- 100 %       Intake/Output Summary (Last 24 hours) at 6/8/2022 1308  Last data filed at 6/8/2022 1255  Gross per 24 hour   Intake --   Output 1650 ml   Net -1650 ml        I had a face to face encounter, and independently examined this patient on 6/8/2022, as outlined below:    PHYSICAL EXAM:  General:    No distress     HEENT: Atraumatic, anicteric sclerae, pink conjunctivae, MMM  Neck:  Supple, symmetrical  Lungs:   CTA. No Wheezing/Rhonchi. No rales. No tenderness. No Accessory muscle use. Heart:   Regular rhythm. No murmur. No JVD   GI/:   Soft. NT. ND. BS normal  Extremities: No edema. No cyanosis. No clubbing. Skin:     Not pale.  Not Jaundiced. No rashes   Psych:  Good insight. Not depressed. Not anxious or agitated. Neurologic: Alert and oriented X 4. EOMs intact. No facial asymmetry. No slurred speech. Symmetrical strength, Sensation grossly intact. Labs     I reviewed today's most current labs and imaging studies. Pertinent labs include:  Recent Labs     06/08/22  0433 06/07/22  1340   WBC 13.5* 18.1*   HGB 9.1* 10.1*   HCT 25.7* 31.1*   * 522*     Recent Labs     06/08/22  1037 06/08/22  0433 06/07/22  2340 06/07/22  1825 06/07/22  1825 06/07/22  1340 06/07/22  1340   * 129* 124*   < > 118*   < > 130*   K 3.6 3.6 3.5   < > 3.7   < > 4.2   CL 93* 92* 86*   < > 79*   < > 86*   CO2 22 23 21   < > 11*   < > 14*   * 84 193*   < > 652*   < > 938*   BUN 64* 67* 71*   < > 74*   < > 82*   CREA 3.40* 3.40* 3.74*   < > 3.65*   < > 4.26*   CA 7.6* 7.1* 7.5*   < > 7.5*   < > 8.6   MG  --  2.3  --   --  2.5*  --  3.2*   PHOS  --  4.4  --   --  7.1*  --   --    ALB  --  1.7*  --   --   --   --  1.7*   TBILI  --  0.3  --   --   --   --  0.4   ALT  --  78  --   --   --   --  94*    < > = values in this interval not displayed. XR CHEST PORT    Result Date: 6/7/2022  No acute findings. No results found. 10/22/20    ECHO ADULT COMPLETE 10/23/2020 10/23/2020    Interpretation Summary  · LV: Estimated LVEF is 55 - 60%. Visually measured ejection fraction. Normal cavity size, wall thickness and systolic function (ejection fraction normal). · MV: Mild mitral valve regurgitation is present.     Signed by: Afsaneh Vasquez MD on 10/23/2020  5:40 PM       Current Medications:     Current Facility-Administered Medications:     dextrose 5% and 0.9% NaCl infusion, 50 mL/hr, IntraVENous, CONTINUOUS, Al-Dabbagh, Elonda Moritz, MD, Last Rate: 50 mL/hr at 06/08/22 1259, 50 mL/hr at 06/08/22 1259    [START ON 6/9/2022] pantoprazole (PROTONIX) tablet 40 mg, 40 mg, Oral, ACB, Al-Dabbagh, Elonda Moritz, MD    [START ON 6/10/2022] Vancomycin Level Due 6/10 2330, , Other, Rx Dosing/Monitoring, Keith Morton MD    rosuvastatin (CRESTOR) tablet 10 mg, 10 mg, Oral, QHS, Keith Morton MD    insulin glargine (LANTUS) injection 16 Units, 16 Units, SubCUTAneous, DAILY, Keith Morton MD, 16 Units at 06/08/22 1258    insulin lispro (HUMALOG) injection 4 Units, 4 Units, SubCUTAneous, TIDAC, Keith Morton MD, 4 Units at 06/08/22 1258    insulin lispro (HUMALOG) injection, , SubCUTAneous, AC&HS, Keith Morton MD    glucose chewable tablet 16 g, 4 Tablet, Oral, PRN, Keith Morton MD    glucagon (GLUCAGEN) injection 1 mg, 1 mg, IntraMUSCular, PRN, Keith Morton MD    dextrose 10% infusion 0-250 mL, 0-250 mL, IntraVENous, PRN, Keith Morton MD    carvediloL (COREG) tablet 12.5 mg, 12.5 mg, Oral, BID WITH MEALS, Keith Morton MD    [Held by provider] insulin regular (NOVOLIN R, HUMULIN R) 100 Units in 0.9% sodium chloride 100 mL infusion, 0-50 Units/hr, IntraVENous, TITRATE, Royce Finch MD, Last Rate: 0.8 mL/hr at 06/08/22 1043, 0.8 Units/hr at 06/08/22 1043    glucose chewable tablet 16 g, 4 Tablet, Oral, PRN, Royce Finch MD    glucagon (GLUCAGEN) injection 1 mg, 1 mg, IntraMUSCular, PRN, Royce Finch MD    dextrose 10% infusion 0-250 mL, 0-250 mL, IntraVENous, PRN, Royce Finch MD    DULoxetine (CYMBALTA) capsule 60 mg, 60 mg, Oral, DAILY, Daylin Melchor MD, 60 mg at 06/08/22 8104    finasteride (PROSCAR) tablet 5 mg, 5 mg, Oral, DAILY, Daylin Melchor MD, 5 mg at 06/08/22 1473    oxyCODONE IR (ROXICODONE) tablet 5 mg, 5 mg, Oral, Q8H PRN, Daylin Melchor MD, 5 mg at 06/08/22 0942    pregabalin (LYRICA) capsule 75 mg, 75 mg, Oral, BID, Daylin Melchor MD, 75 mg at 06/08/22 0937    tamsulosin (FLOMAX) capsule 0.4 mg, 0.4 mg, Oral, DAILY, Daylin Melchor MD, 0.4 mg at 06/08/22 0937    heparin (porcine) injection 5,000 Units, 5,000 Units, SubCUTAneous, Q8H, Shady Argueta MD, 5,000 Units at 06/08/22 0500    ondansetron (ZOFRAN) injection 4 mg, 4 mg, IntraVENous, Q4H PRN, Shady Argueta MD    acetaminophen (TYLENOL) suppository 650 mg, 650 mg, Rectal, Q4H PRN, Shady Argueta MD    [COMPLETED] cefepime (MAXIPIME) 2 g in 0.9% sodium chloride 10 mL IV syringe, 2 g, IntraVENous, NOW, 2 g at 06/08/22 0032 **FOLLOWED BY** cefepime (MAXIPIME) 1 g in 0.9% sodium chloride (MBP/ADV) 50 mL MBP, 1 g, IntraVENous, Q12H, Brandi Walker MD    vancomycin (VANCOCIN) 750 mg in 0.9% sodium chloride 250 mL (VIAL-MATE), 750 mg, IntraVENous, Q24H, Shady Argueta MD     Procedures: see electronic medical records for all procedures/Xrays and details which were not copied into this note but were reviewed prior to creation of Plan. Reviewed most current lab test results and cultures  YES  Reviewed most current radiology test results   YES  Review and summation of old records today    NO  Reviewed patient's current orders and MAR    YES  PMH/ reviewed - no change compared to H&P  ________________________________________________________________________  Care Plan discussed with:    Comments   Patient x    Family      RN x    Care Manager     Consultant                        Multidiciplinary team rounds were held today with , nursing, pharmacist and clinical coordinator. Patient's plan of care was discussed; medications were reviewed and discharge planning was addressed.      ________________________________________________________________________  Total NON critical care TIME:   40  Minutes    Total CRITICAL CARE TIME Spent:   Minutes non procedure based      Comments   >50% of visit spent in counseling and coordination of care x     This includes time during multidisciplinary rounds if indicated above ________________________________________________________________________  Aloma Paling, MD

## 2022-06-09 VITALS
WEIGHT: 160 LBS | OXYGEN SATURATION: 100 % | HEIGHT: 69 IN | SYSTOLIC BLOOD PRESSURE: 174 MMHG | HEART RATE: 83 BPM | RESPIRATION RATE: 15 BRPM | TEMPERATURE: 97.7 F | DIASTOLIC BLOOD PRESSURE: 98 MMHG | BODY MASS INDEX: 23.7 KG/M2

## 2022-06-09 LAB
ANION GAP SERPL CALC-SCNC: 10 MMOL/L (ref 5–15)
BUN SERPL-MCNC: 49 MG/DL (ref 6–20)
BUN/CREAT SERPL: 17 (ref 12–20)
CALCIUM SERPL-MCNC: 7.8 MG/DL (ref 8.5–10.1)
CHLORIDE SERPL-SCNC: 101 MMOL/L (ref 97–108)
CO2 SERPL-SCNC: 23 MMOL/L (ref 21–32)
CREAT SERPL-MCNC: 2.83 MG/DL (ref 0.7–1.3)
EST. AVERAGE GLUCOSE BLD GHB EST-MCNC: 318 MG/DL
GLUCOSE BLD STRIP.AUTO-MCNC: 102 MG/DL (ref 65–117)
GLUCOSE BLD STRIP.AUTO-MCNC: 113 MG/DL (ref 65–117)
GLUCOSE BLD STRIP.AUTO-MCNC: 99 MG/DL (ref 65–117)
GLUCOSE SERPL-MCNC: 112 MG/DL (ref 65–100)
HBA1C MFR BLD: 12.7 % (ref 4–5.6)
POTASSIUM SERPL-SCNC: 3.1 MMOL/L (ref 3.5–5.1)
SERVICE CMNT-IMP: NORMAL
SODIUM SERPL-SCNC: 134 MMOL/L (ref 136–145)

## 2022-06-09 PROCEDURE — 74011000258 HC RX REV CODE- 258: Performed by: GENERAL ACUTE CARE HOSPITAL

## 2022-06-09 PROCEDURE — 51798 US URINE CAPACITY MEASURE: CPT

## 2022-06-09 PROCEDURE — 74011250637 HC RX REV CODE- 250/637: Performed by: GENERAL ACUTE CARE HOSPITAL

## 2022-06-09 PROCEDURE — 74011250636 HC RX REV CODE- 250/636: Performed by: GENERAL ACUTE CARE HOSPITAL

## 2022-06-09 PROCEDURE — 77030005513 HC CATH URETH FOL11 MDII -B

## 2022-06-09 PROCEDURE — 74011636637 HC RX REV CODE- 636/637: Performed by: GENERAL ACUTE CARE HOSPITAL

## 2022-06-09 PROCEDURE — 36415 COLL VENOUS BLD VENIPUNCTURE: CPT

## 2022-06-09 PROCEDURE — 80048 BASIC METABOLIC PNL TOTAL CA: CPT

## 2022-06-09 PROCEDURE — 74011250637 HC RX REV CODE- 250/637: Performed by: HOSPITALIST

## 2022-06-09 PROCEDURE — 74011250636 HC RX REV CODE- 250/636: Performed by: HOSPITALIST

## 2022-06-09 PROCEDURE — 82962 GLUCOSE BLOOD TEST: CPT

## 2022-06-09 PROCEDURE — 97162 PT EVAL MOD COMPLEX 30 MIN: CPT

## 2022-06-09 PROCEDURE — 97116 GAIT TRAINING THERAPY: CPT

## 2022-06-09 RX ORDER — POTASSIUM CHLORIDE 20 MEQ/1
40 TABLET, EXTENDED RELEASE ORAL 3 TIMES DAILY
Status: COMPLETED | OUTPATIENT
Start: 2022-06-09 | End: 2022-06-09

## 2022-06-09 RX ORDER — ROSUVASTATIN CALCIUM 20 MG/1
20 TABLET, COATED ORAL
Status: DISCONTINUED | OUTPATIENT
Start: 2022-06-09 | End: 2022-06-09 | Stop reason: HOSPADM

## 2022-06-09 RX ADMIN — DULOXETINE HYDROCHLORIDE 60 MG: 30 CAPSULE, DELAYED RELEASE ORAL at 08:43

## 2022-06-09 RX ADMIN — CEFEPIME HYDROCHLORIDE 2 G: 2 INJECTION, POWDER, FOR SOLUTION INTRAVENOUS at 12:25

## 2022-06-09 RX ADMIN — HEPARIN SODIUM 5000 UNITS: 5000 INJECTION INTRAVENOUS; SUBCUTANEOUS at 05:40

## 2022-06-09 RX ADMIN — OXYCODONE 5 MG: 5 TABLET ORAL at 04:30

## 2022-06-09 RX ADMIN — TAMSULOSIN HYDROCHLORIDE 0.4 MG: 0.4 CAPSULE ORAL at 08:43

## 2022-06-09 RX ADMIN — INSULIN GLARGINE 16 UNITS: 100 INJECTION, SOLUTION SUBCUTANEOUS at 08:41

## 2022-06-09 RX ADMIN — PANTOPRAZOLE SODIUM 40 MG: 40 TABLET, DELAYED RELEASE ORAL at 08:43

## 2022-06-09 RX ADMIN — FINASTERIDE 5 MG: 5 TABLET, FILM COATED ORAL at 08:43

## 2022-06-09 RX ADMIN — Medication 4 UNITS: at 08:42

## 2022-06-09 RX ADMIN — POTASSIUM CHLORIDE 40 MEQ: 20 TABLET, EXTENDED RELEASE ORAL at 14:59

## 2022-06-09 RX ADMIN — OXYCODONE 5 MG: 5 TABLET ORAL at 12:26

## 2022-06-09 RX ADMIN — Medication 4 UNITS: at 12:25

## 2022-06-09 RX ADMIN — PREGABALIN 75 MG: 75 CAPSULE ORAL at 08:43

## 2022-06-09 RX ADMIN — CARVEDILOL 12.5 MG: 12.5 TABLET, FILM COATED ORAL at 08:43

## 2022-06-09 RX ADMIN — POTASSIUM CHLORIDE 40 MEQ: 20 TABLET, EXTENDED RELEASE ORAL at 09:15

## 2022-06-09 NOTE — PROGRESS NOTES
Orders received, chart reviewed and patient evaluated by physical therapy. Pending progression with skilled acute physical therapy, recommend:  Physical therapy at least 2 days/week in the home  He will need a rollator walker to improve gait stability and allow sitting rests due to chronic low back pain from lumbar stenosis. He is a high risk for falls with Tinetti score of 12/28. Recommend with nursing OOB to chair 3x/day and walking daily with 1 person assist and walker. Thank you for completing as able in order to maintain patient strength, endurance and independence. Full evaluation to follow.      Mar Ott, PT

## 2022-06-09 NOTE — PROGRESS NOTES
Received notification from bedside RN about patient with regards to: recurrent urinary retention, was straight cath for 1 L at 2130, now showing 896 ml on bladder scan  VS: /84, HR 73, RR 17, O2 sat 97% on RA    Intervention given:  Beard insertion ordered

## 2022-06-09 NOTE — PROGRESS NOTES
2150 - Straight cathed, 1 L of clear yellow urine drained. Difficult cath d/t history of enlarged prostate, 1 attempt.     0500 - Beard ordered for retention. Sterling Reeder attempted. 12 [place] coude catheter successfully inserted.

## 2022-06-09 NOTE — PROGRESS NOTES
PHYSICAL THERAPY EVALUATION/DISCHARGE  Patient: Janice Vazquez (42 y.o. male)  Date: 6/9/2022  Primary Diagnosis: DKA (diabetic ketoacidosis[facility] [E11.10]       Precautions:   Fall      ASSESSMENT  Based on the objective data described below, the patient presents with chronic lower back pain due to lumbar stenosis which worsens with standing and walking, but relieved with sitting, diabetic retinopathy with impaired vision, neuropathy with decreased ankle strength and impaired sensation causing delayed standing balance reactions with history of falls x 6 in the last year. Came to sitting independently. Stood with sba, but lost balance backwards and needed to be assisted to regain balance. Gait assessed and patient tolerated ambulating 175 feet with gait belt only, but had frequent path deviations with lob laterally R>L. Returned to room and left on the side of bed to eat his lunch. He needs a rollator type walker to allow sitting rests as needed due to back pain and to improve his gait stability due to delayed balance reactions with frequent losses of balance and h/o falls. Functional Outcome Measure: The patient scored 12/28 on the Tinetti outcome measure which is indicative of being at high risk for falls. Other factors to consider for discharge: lives with family in mobile trailer home; history of falls     Further skilled acute physical therapy is not indicated at this time. Will sign off at this time as patient to be discharged home later today. PLAN :  Recommendation for discharge: (in order for the patient to meet his/her long term goals)  Physical therapy at least 2 days/week in the home     This discharge recommendation:  Has not yet been discussed the attending provider and/or case management    IF patient discharges home will need the following DME: rollator - please order for use at home. Next day delivery is ok. SUBJECTIVE:   Patient stated  I fall a lot  .     OBJECTIVE DATA SUMMARY:   HISTORY:    Past Medical History:   Diagnosis Date    Bipolar 1 disorder, depressed ([facility]     Bipolar disorder ([facility]     Depression     Diabetes ([facility]     DKA, type 1 ([facility] 1/27/2013    diagnosed age 23    H/O noncompliance with medical treatment, presenting hazards to health     MRSA (methicillin resistant staph aureus) culture positive     MRSA (methicillin resistant Staphylococcus aureus)     Face    Noncompliance with medication regimen     Second hand smoke exposure     Seizure ([facility]     Seizures ([facility] 2006 or 2007    one episode during half-way     Past Surgical History:   Procedure Laterality Date    HX HEENT      top left wisdom tooth    HX ORTHOPAEDIC Left     wrist; MCV    UPPER GI ENDOSCOPY,BIOPSY  11/20/2018            Prior level of function: independent ADLs and mobility, but limited tolerance of standing and walking due to back pain; falls due to impaired balance reactions. Personal factors and/or comorbidities impacting plan of care: dm, chronic back pain; impaired vision; impaired sensation in feet.     Home Situation  Home Environment: Trailer/mobile home  # Steps to Enter: 10  Rails to Enter: Yes  Hand Rails : Bilateral  Wheelchair Ramp: Yes  One/Two Story Residence: One story  Living Alone: No  Support Systems: Other Family Member(s)  Patient Expects to be Discharged to[de-identified] Home  Current DME Used/Available at Home: None  Tub or Shower Type: Shower    EXAMINATION/PRESENTATION/DECISION MAKING:   Critical Behavior:  Neurologic State: Alert  Orientation Level: Oriented X4  Cognition: Appropriate decision making,Appropriate safety awareness,Appropriate for age attention/concentration,Follows commands  Safety/Judgement: Awareness of environment,Fall prevention,Good awareness of safety precautions (neuropathy bilateral feet/legs)  Hearing:   good  Skin:  No findings  Edema: mild in feet  Range Of Motion:  AROM: Generally decreased, functional           PROM: Within functional limits           Strength:    Strength: Generally decreased, functional (3-/5 bilateral ankle df)                    Tone & Sensation:   Tone: Abnormal (increased rigidity)              Sensation: Impaired (poor bilateral feet)               Coordination:  Coordination: Generally decreased, functional  Vision:   Acuity: Impaired near vision; Impaired far vision (retinopathy)  Functional Mobility:  Bed Mobility:  Rolling: Independent  Supine to Sit: Independent  Sit to Supine: Independent  Scooting: Independent  Transfers:  Sit to Stand: Stand-by assistance  Stand to Sit: Stand-by assistance        Bed to Chair:  (no chair in room)              Balance:   Sitting: Intact  Standing: Impaired  Standing - Static: Good;Occasional  Standing - Dynamic : Fair;Poor  Ambulation/Gait Training:  Distance (ft): 175 Feet (ft)  Assistive Device: Gait belt  Ambulation - Level of Assistance: Contact guard assistance     Gait Description (WDL): Exceptions to WDL  Gait Abnormalities: Decreased step clearance; Path deviations; Altered arm swing;Trunk sway increased (multiple lob)        Base of Support: Widened     Speed/Ana: Pace decreased (<100 feet/min)  Step Length: Right shortened;Left shortened        Interventions: Safety awareness training (needs rollator walker)        Functional Measure:  Tinetti test:    Sitting Balance: 1  Arises: 1  Attempts to Rise: 2  Immediate Standing Balance: 1  Standing Balance: 1  Nudged: 1  Eyes Closed: 1  Turn 360 Degrees - Continuous/Discontinuous: 0  Turn 360 Degrees - Steady/Unsteady: 0  Sitting Down: 1  Balance Score: 9 Balance total score  Indication of Gait: 1  R Step Length/Height: 0  L Step Length/Height: 0  R Foot Clearance: 1  L Foot Clearance: 1  Step Symmetry: 0  Step Continuity: 0  Path: 0  Trunk: 0  Walking Time: 0  Gait Score: 3 Gait total score  Total Score: 12/28 Overall total score         Tinetti Tool Score Risk of Falls  <19 = High Fall Risk  19-24 = Moderate Fall Risk  25-28 = Low Fall Risk  Tinetti ME. Performance-Oriented Assessment of Mobility Problems in Elderly Patients. [facility]; [id number]. (Scoring Description: PT Bulletin Feb. 10, 1993)    Older adults: Collin Marx et al, 2009; n = 1000 [facility] elderly evaluated with ABC, MARY, ADL, and IADL)  · Mean MARY score for males aged 69-68 years = 26.21(3.40)  · Mean MARY score for females age 69-68 years = 25.16(4.30)  · Mean MARY score for males over 80 years = 23.29(6.02)  · Mean MARY score for females over 80 years = 17.20(8.32)        Physical Therapy Evaluation Charge Determination   History Examination Presentation Decision-Making   HIGH Complexity :3+ comorbidities / personal factors will impact the outcome/ POC  HIGH Complexity : 4+ Standardized tests and measures addressing body structure, function, activity limitation and / or participation in recreation  MEDIUM Complexity : Evolving with changing characteristics  Other outcome measures Tinetti  HIGH       Based on the above components, the patient evaluation is determined to be of the following complexity level: MEDIUM    Pain Rating:  Back a 10/10 when the session ended. RN arrived with pain medication    Activity Tolerance:   Fair, SpO2 stable on RA and requires rest breaks      After treatment patient left in no apparent distress:   Call bell within reach and sitting on side of bed    COMMUNICATION/EDUCATION:   The patients plan of care was discussed with: Registered nurse. Fall prevention education was provided and the patient/caregiver indicated understanding., Patient/family have participated as able in goal setting and plan of care. and Patient/family agree to work toward stated goals and plan of care.     Thank you for this referral.  Gloria Henry, PT   Time Calculation: 11 mins

## 2022-06-09 NOTE — DISCHARGE SUMMARY
Hospitalist Discharge Summary     Patient ID:  Edmar Bray  [id number]  20 y.o.  1980 6/7/2022    PCP on record: Talia Nassar MD    Admit date: 6/7/2022  Discharge date and time: 6/9/2022    DISCHARGE DIAGNOSIS:  As below     CONSULTATIONS:  IP CONSULT TO HOSPITALIST    Excerpted HPI from H&P of Soledad Sow MD:  Alexandria Mott is a 46 y.o.  male who presents with above complaints. Patient started with intractable nausea and vomiting 2 days ago. He had multiple episodes of vomiting at home. He was able to keep only minimal amount of water in. He also reported pressure-like midsternal chest pain which started together with vomiting, pain is nonradiating, denies shortness of breath. He admits to dry cough started with chest pain. He stated, I was coughing and vomiting, coughing and vomiting. ...... he denies any fever or chills. No abdominal pain. No diarrhea or constipation. He reports normal BM 2 days ago. He denies dysuria or hematuria. He has history of multiple hospitalizations for DKA. Blood work revealed in ED DKA again. He has history of being noncompliant with his insulin, but stated today that he was taking his insulin as he supposed to this time. He denies any difficulties with urination now, stated \" I am peeing ok\". He reports some blood tinged vomiting at the end.   ____________________________________________________________________  DISCHARGE SUMMARY/HOSPITAL COURSE:  for full details see H&P, daily progress notes, labs, consult notes.            DKA in brittle DM 1, d/t non compliance with diet   Hyponatremia, metabolic acidosis  insulin drip DCed  DKA resolved, home insulin regimen resumed   Supplement electrolytes abnormalities aggressively.    A1c 12.7  Tolerated diet with out problem  Seen by PT and recs PT at home     Chest pain, resolved   DDX: ro cardiac vs mague irma tears vs esophagitis   cxray negative   ECG or troponin neg    Leukocytosis  Likely reactive in the region, no fever or chills  Chest x-ray clear  UA is neg and he is asymptomatic  Hx Emphysematous cystitis with Klebsiella pneumoniae, POA with Recurrent Klebsiella pneumoniae uti 2/2022 and 3/2022   --- FU UA   Addendum: procalcitonin high, lactic acidosis, BC obtained; start empiric AB; Temp low, will DC Abx as Cx are neg so far     FELECIA on CKD 3b  Urinary retention   Cr at baseline ~ 1.6-2.1, admission 4.26  DC IVF  DC lasix   Hx urinary retention in the pst requiring Hansen, pt developed urinary retention and hansen inserted, will keep on DC, pt advised to f/u with Urology Dr Kim Navarro      Anemia of chronic disease: Hemoglobin stable, monitor  BPH: cont flomax,  finasteride  HTN: resume home meds as above  Hyperlipidemia: crestor    DM neuropathy         _______________________________________________________________________  Patient seen and examined by me on discharge day. Pertinent Findings:  Gen:    Not in distress  Chest: Clear lungs  CVS:   Regular rhythm. No edema  Abd:  Soft, not distended, not tender  Neuro:  Alert, oriented   _______________________________________________________________________  DISCHARGE MEDICATIONS:   Current Discharge Medication List      CONTINUE these medications which have NOT CHANGED    Details   rosuvastatin (CRESTOR) 20 mg tablet Take 1 Tablet by mouth nightly. Qty: 90 Tablet, Refills: 0    Associated Diagnoses: Hypercholesteremia      oxyCODONE IR (Roxicodone) 5 mg immediate release tablet Take 1 Tablet by mouth nightly as needed for Pain for up to 30 days. Max Daily Amount: 5 mg. Qty: 15 Tablet, Refills: 0    Associated Diagnoses: Chronic midline low back pain without sciatica; Other osteoarthritis of spine, cervical region; Osteoarthritis of lumbar spine, unspecified spinal osteoarthritis complication status; Right hip pain      amLODIPine (NORVASC) 5 mg tablet Take 1 Tablet by mouth daily.   Qty: 30 Tablet, Refills: 1 carvediloL (COREG) 12.5 mg tablet Take 1 Tablet by mouth two (2) times daily (with meals). Qty: 60 Tablet, Refills: 1      clotrimazole (LOTRIMIN) 1 % topical cream Apply  to affected area two (2) times a day for 30 days. Apply twice a day for 2-4 weeks. Apply until symptoms resolve. Qty: 113 g, Refills: 0      mupirocin (BACTROBAN) 2 % ointment Apply  to affected area daily. Apply until symptoms resolve  Qty: 22 g, Refills: 0      insulin glargine (LANTUS) 100 unit/mL injection 16 Units by SubCUTAneous route daily. Qty: 1 mL, Refills: 0      tamsulosin (FLOMAX) 0.4 mg capsule Take 0.4 mg by mouth daily. insulin aspart U-100 (NovoLOG Flexpen U-100 Insulin) 100 unit/mL (3 mL) inpn 2 Units by SubCUTAneous route Before breakfast, lunch, and dinner. Qty: 10 Adjustable Dose Pre-filled Pen Syringe, Refills: 3      naloxone (Narcan) 4 mg/actuation nasal spray Use 1 spray intranasally, then discard. Repeat with new spray every 2 min as needed for opioid overdose symptoms, alternating nostrils. Qty: 1 Each, Refills: 0    Associated Diagnoses: Right hip pain      pen needle, diabetic 30 gauge x 3/16\" ndle 5 Units by Does Not Apply route Before breakfast, lunch, and dinner. Qty: 100 Each, Refills: 3    Associated Diagnoses: DM type 2, goal HbA1c < 7% ([facility])      finasteride (PROSCAR) 5 mg tablet Take 1 Tablet by mouth daily. Qty: 30 Tablet, Refills: 2      ondansetron (ZOFRAN ODT) 4 mg disintegrating tablet Take 1 Tablet by mouth every eight (8) hours as needed for Nausea or Vomiting. Qty: 20 Tablet, Refills: 0      DULoxetine (CYMBALTA) 60 mg capsule Take 1 Capsule by mouth daily. 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   Qty: 30 Tablet, Refills: 0      ergocalciferol (ERGOCALCIFEROL) 1,250 mcg (50,000 unit) capsule Take 1 capsule by mouth once a week  Qty: 12 Capsule, Refills: 0    Associated Diagnoses: Vitamin D deficiency      pregabalin (Lyrica) 75 mg capsule Take 75 mg by mouth two (2) times a day. STOP taking these medications       furosemide (LASIX) 20 mg tablet Comments:   Reason for Stopping:                 Patient Follow Up Instructions: Activity: Activity as tolerated  Diet: ADULT DIET Regular; 4 carb choices (60 gm/meal)  Wound Care: None needed  Follow-up with PCP in  1 week.   Follow-up tests/labs none       Follow-up Information     Follow up With Specialties Details Why Flavia Mendoza MD Internal Medicine Physician   [address] [address] [facility]      Dwayne Clifton MD Urology In 1 week  60 [facility] 200  [facility] [phone]          ________________________________________________________________    Risk of deterioration: Moderate    Condition at Discharge:  Stable  __________________________________________________________________    Disposition  Home with family, no needs    ____________________________________________________________________    Code Status: Full Code  ___________________________________________________________________      Total time in minutes spent coordinating this discharge (includes going over instructions, follow-up, prescriptions, and preparing report for sign off to her PCP) :  >30 minutes    Signed:  Jaycob Hunt MD

## 2022-06-09 NOTE — DISCHARGE INSTRUCTIONS
Patient Education   Patient Education        Learning About How to Care for a PersPatient Education        Chronic Kidney Disease: Care Instructions  Overview     Chronic kidney disease happens when your kidneys don't work as well as they should. Your kidneys have a few important jobs. They remove waste from your blood. This waste leaves your body in your urine. They also balance your body's fluids and chemicals. When your kidneys don't work well, extra waste and fluid can build up. This can poison the body and sometimes cause death. The most common causes of this disease are diabetes and high blood pressure. In some cases, the disease develops in 2 to 3 months. But it usually develops over many years. If you take medicine and make healthy changes to your lifestyle, you may be able to prevent the disease from getting worse. But if your kidney damage gets worse, you may need dialysis or a kidney transplant. Dialysis uses a machine to filter waste from the blood. A transplant is surgery to give you a healthy kidney from another person. Follow-up care is a key part of your treatment and safety. Be sure to make and go to all appointments, and call your doctor if you are having problems. It's also a good idea to know your test results and keep a list of the medicines you take. How can you care for yourself at home? Treatments and appointments    · Be safe with medicines. Take your medicines exactly as prescribed. Call your doctor if you have any problems with your medicine. You also may take medicine to control your blood pressure or to treat diabetes. Many people who have diabetes take blood pressure medicine.     · If you have diabetes, do your best to keep your blood sugar in your target range. You may do this by eating healthy food and exercising.  You may also take medicines.     · Go to your dialysis appointments if you have this treatment.     · Do not take ibuprofen, naproxen, or similar medicines, unless your doctor tells you to. These may make the disease worse.     · Do not take any vitamins, over-the-counter medicines, or herbal products without talking to your doctor first.     · Do not smoke or use other tobacco products. Smoking can reduce blood flow to the kidneys. If you need help quitting, talk to your doctor about stop-smoking programs and medicines. These can increase your chances of quitting for good.     · Limit your use of alcohol and avoid illegal drugs.     · Talk to your doctor about an exercise plan. Exercise helps lower your blood pressure. It also makes you feel better.     · If you have an advance directive, let your doctor know. It may include a living will and a durable power of  for health care. If you don't have one, you may want to prepare one. It lets your doctor and loved ones know your health care wishes if you become unable to speak for yourself. Diet    · Talk to a registered dietitian. They can help you make a meal plan that is right for you. Most people with kidney disease need to limit salt (sodium), fluids, and protein. Some also have to limit potassium and phosphorus.     · You may have to give up many foods you like. But try to focus on the fact that this will help you stay healthy for as long as possible.     · If you have a hard time eating enough, talk to your doctor or dietitian about ways to add calories to your diet.     · Your diet may change as your disease changes. See your doctor for regular testing. And work with a dietitian to change your diet as needed. When should you call for help? Call 911 anytime you think you may need emergency care. For example, call if:    · You passed out (lost consciousness).    Call your doctor now or seek immediate medical care if:    · You have less urine than normal or no urine.     · You have trouble urinating or can urinate only very small amounts.     · You are confused or have trouble thinking clearly.     · You feel weaker or more tired than usual.     · You are very thirsty, lightheaded, or dizzy.     · You have nausea and vomiting.     · You have new swelling of your arms or feet, or your swelling is worse.     · You have blood in your urine.     · You have new or worse trouble breathing. Watch closely for changes in your health, and be sure to contact your doctor if:    · You have any problems with your medicine or other treatment. Where can you learn more? Go to http://www.gray.com/  Enter N276 in the search box to learn more about \"Chronic Kidney Disease: Care Instructions. \"  Current as of: September 8, 2021               Content Version: 13.2  © 2006-2022 [employer]. Care instructions adapted under license by [employer] (which disclaims liability or warranty for this information). If you have questions about a medical condition or this instruction, always ask your healthcare professional. [place] [address] any warranty or liability for your use of this information. on's Indwelling Urinary Catheter  Introduction     A urinary catheter is a flexible plastic tube that's used to drain urine from the bladder when a person can't urinate. The catheter is placed into the bladder by inserting it through the urethra. The urethra is the opening that carries urine from the bladder to the outside of the body. When the catheter is in the bladder, a small balloon is used to keep the catheter in place. The catheter lets urine drain from the bladder into a collection bag. Urinary catheters can be used in both men and women. A catheter that stays in place for a longer period of time is called an indwelling catheter. A catheter may be needed because of certain medical conditions. These include an enlarged prostate or problems controlling urine. It may be used after surgery on the pelvis or urinary tract.  Urinary catheters are also used when the lower part of the body is paralyzed. When helping a loved one with a catheter, try to be relaxed. Caring for a catheter can be embarrassing for both of you. If you are calm and don't seem embarrassed, the person may feel more comfortable. How do you take care of the catheter? Wear disposable gloves when handling someone's catheter. Make sure to follow all of the instructions the doctor has given. And always wash your hands before and after you're done. Here are some other things to remember when caring for someone's catheter:  · Make sure that urine is running out of the catheter into the urine collection bag. And make sure that the catheter tubing does not get twisted or bent. · Keep the urine collection bag below the level of the bladder. At night it may be helpful to hang the bag on the side of the bed. · Make sure that the urine collection bag does not drag and pull on the catheter. · It's okay to shower with a catheter and urine collection bag in place, unless the doctor says not to. · Check for swelling or signs of infection in the area around the catheter. Signs of infection include pus and irritated, swollen, red, or tender skin. · Clean the area around the catheter daily with soap and water. Dry with a clean towel afterward. · Do not apply powder or lotion to the skin around the catheter. · Do not tug or pull on the catheter. · Sexual intercourse may still be possible for people who wear a catheter. It's best to talk with a doctor about options. How do you empty the bag? The urine collection bag needs to be emptied regularly. It's best to empty the bag when it's about half full or at bedtime. If the doctor has asked you to measure the amount of urine, do that before you empty the urine into the toilet. When you are ready to empty the bag, follow these steps:  1. Put on disposable gloves. 2. Remove the drain spout from its sleeve at the bottom of the collection bag. Open the valve on the spout.   3. Let the urine flow out of the bag and into the toilet or a container. Do not let the tubing or drain spout touch anything. 4. After you empty the bag, close the valve and put the drain spout back into its sleeve. 5. Remove your gloves, and throw them away. 6. Wash your hands with soap and water. How do you care for someone after the catheter is removed? After the catheter is taken out, the person may have trouble urinating. If this happens, try helping them sit in a few inches of warm water (sitz bath). If the urge to urinate comes during the sitz bath, it may be easier for them to urinate while still in the bath. Some burning may happen the first few times the person urinates. If the burning lasts longer, it may be a sign of an infection. If the catheter causes irritation or a rash, wearing loose, cotton underwear may help. Watch closely for changes in the person's health. Be sure to contact their doctor if you notice any problems or if they are unable to urinate at all. Where can you learn more? Go to http://www.gray.com/  Enter X535 in the search box to learn more about \"Learning About How to Care for a Person's Indwelling Urinary Catheter. \"  Current as of: October 18, 2021               Content Version: 13.2  © 7722-1922 [employer]. Care instructions adapted under license by [employer] (which disclaims liability or warranty for this information). If you have questions about a medical condition or this instruction, always ask your healthcare professional. [place] [address] any warranty or liability for your use of this information. Diabetic Ketoacidosis (DKA): Care Instructions  Overview  Diabetic ketoacidosis (DKA) happens when the body does not have enough insulin and can't get the sugar it needs for energy. When the body can't use sugar for energy, it starts to use fat for energy. This process makes fatty acids called ketones. The ketones build up in the blood and change the chemical balance in your body. This problem can be very dangerous and needs to be treated. Without treatment, it can lead to a coma or death. DKA occurs most often in people with type 1 diabetes. But people with type 2 diabetes also can get it. DKA can be caused by many things. It can happen if you don't take enough insulin. It can also happen if you have an infection or illness like the flu. Sometimes it happens if you are very dehydrated. DKA can only be treated with insulin and fluids. These are often given in a vein (IV). Follow-up care is a key part of your treatment and safety. Be sure to make and go to all appointments, and call your doctor if you are having problems. It's also a good idea to know your test results and keep a list of the medicines you take. How can you care for yourself at home? To reduce your chance of ketoacidosis:  · Take your insulin and other diabetes medicines on time and in the right dose. ? If an infection caused your DKA and your doctor prescribed antibiotics, take them as directed. Do not stop taking them just because you feel better. You need to take the full course of antibiotics. · Test your blood sugar before meals and at bedtime or as often as your doctor advises. This is the best way to know when your blood sugar is high so you can treat it early. Watching for symptoms is not as helpful. This is because you may not have symptoms until your blood sugar is very high. Or you may not notice them. · Teach others at work and at home how to check your blood sugar. Make sure that someone else knows how do it in case you can't. · Wear or carry medical identification at all times. This is very important in case you are too sick or injured to speak for yourself. · Talk to your doctor about when you can start to exercise again. · Eat regular meals that spread your calories and carbohydrate throughout the day.  This will help keep your blood sugar steady. · When you are sick:  ? Take your insulin and diabetes medicines. This is important even if you are vomiting and having trouble eating or drinking. Your blood sugar may go up because you are sick. If you are eating less than normal, you may need to change your dose of insulin. Talk with your doctor about a plan when you are well. Then you will know what to do when you are sick. ? Drink extra fluids to prevent dehydration. These include water, broth, and sugar-free drinks. If you don't drink enough, the insulin from your shot may not get into your blood. So your blood sugar may go up. ? Try to eat as you normally do, with a focus on healthy food choices. ? Check your blood sugar at least every 3 to 4 hours. Check it more often if it's rising fast. If your doctor has told you to take an extra insulin dose for high blood sugar levels (for example, above 240 mg/dL) be sure to take the right amount. If you're not sure how much to take, call your doctor. ? Check your temperature and pulse often. If your temperature goes up, call your doctor. You may be getting worse. ? If you take insulin, check your urine or blood for ketones, especially when you have high blood sugar (for example, above 240 mg/dL). Call your doctor if your ketone level is moderate or high. If you know your blood sugar is high, treat it before it gets worse. · If you missed your usual dose of insulin or other diabetes medicine, take the missed dose or take the amount your doctor told you to take if this happens. · If you and your doctor decide on a dose of extra-fast-acting insulin, give yourself the right dose. If you take insulin and your doctor has not told you how much fast-acting insulin to take based on your blood sugar level, call your doctor. · Drink extra water or sugar-free drinks to prevent dehydration. · Wait 30 minutes after you take extra insulin or missed medicines.  Then check your blood sugar again.  · If symptoms of high blood sugar get worse or your blood sugar level keeps rising, call your doctor. If you start to feel sleepy or confused, call 911. When should you call for help? Call 911 anytime you think you may need emergency care. For example, call if:    · You passed out (lost consciousness).     · You are confused or cannot think clearly.     · Your blood sugar is very high or very low. Watch closely for changes in your health, and be sure to contact your doctor if:    · Your blood sugar stays outside the level your doctor set for you.     · You have any problems. Where can you learn more? Go to http://www.gray.com/  Enter [id number] in the search box to learn more about \"Diabetic Ketoacidosis (DKA): Care Instructions. \"  Current as of: July 28, 2021               Content Version: 13.2  © 5953-1223 [employer]. Care instructions adapted under license by [employer] (which disclaims liability or warranty for this information). If you have questions about a medical condition or this instruction, always ask your healthcare professional. [address] any warranty or liability for your use of this information.

## 2022-06-09 NOTE — PROGRESS NOTES
Transition of Care Plan:    Patient cleared for discharge per Case Management viewpoint. Mother at bedside. RUR: 27%  Disposition: Home with follow-up appts - mother  Follow up appointments: PCP, Endocrinology, Ophth, Urology, PT  DME needed: working with PCP to obtain a rollator - already initiated  Transportation at Discharge: Mother - at bedside  Keys or means to access home:      Yes   Medicare Letter:N/A  Is patient a BCPI-A Bundle:   N/A        If yes, was Bundle Letter given?:    Is patient a [place] and connected with the [place]? N/A            If yes, was [place] transfer form completed and [facility] notified? Caregiver Contact:mother  Discharge Caregiver contacted prior to discharge? Yes    Primary Decision MakerVerisael Chun Mother - [phone]  Care Conference needed?:       No              Reason for Readmission:     Diabetic ketoacidosis         RUR Score/Risk Level:     27%    PCP: First and Last name:  Dr. Rhiannon Bishop   Name of Practice: [facility]   Are you a current patient: Yes/No: Yes   Approximate date of last visit: 6/2/2022   Can you participate in a virtual visit with your PCP: Yes    Is a Care Conference indicated: Patient reviewed with Readmission Weekly Team to discuss ideas to prevent readmission      Did you attend your follow up appointment (s): If not, why not: Yes         Resources/supports as identified by patient/family:   [facility] Ophthalmology, [facility] Urology, Endocrinology, [facility], MOTHER       Top Challenges facing patient (as identified by patient/family and CM): Finances/Medication cost?     Mother assists with any co-pays for medication  Transportation      Mother provides all transport  Support system or lack thereof? No other support than Mother and Medicaid resources  Living arrangements? Lives with mother in one story home   Self-care/ADLs/Cognition?      Independent with all ADL's and Mobility - being followed by outpatient PT for back pain Current Advanced Directive/Advance Care Plan:  Full Code           Plan for utilizing home health:   Not at present time             Transition of Care Plan:    Based on readmission, the patient's previous Plan of Care   has been evaluated and/or modified. The current Transition of Care Plan is:       Patient plans to return home with his mother at discharge today. He states he is working with his PCP - Dr. Mikey Hinkle for all needs, including Diabetes Management, Handicap Parking Pass, Rollator and Disability Process. Patient states he checks his blood sugars at least once daily - has a [employer] and insurance has been covering all costs. He plans to check with insurance to make sure they will continue to cover all costs. Patient also receives \"Mom's Meals\" through insurance, which is a box of 12 diabetic meals (breakfast, lunch and dinner) for him when him or his mother are unable to cook. Mother is also independent with all ADL's, but uses this option when she is away or difficulty with costs. He denies any lack of food, diabetic supplies or medications. His mother or Medicaid provide all transportation. He denies missing any medical appts and has a new Endocrinology appt scheduled for 6/21/2022 with Dr. Zoila Mckeon to discuss an insulin pump as his PCP discussed it but could not prescribe and manage it. All prescriptions through [facility]. Patient plans to continue all follow-up appts as scheduled. No other needs identified. Care Management Interventions  PCP Verified by CM:  Yes  Last Visit to PCP: 06/02/22  Transition of Care Consult (CM Consult): Discharge Planning  MyChart Signup: No  Discharge Durable Medical Equipment: No  Physical Therapy Consult: Yes  Occupational Therapy Consult: No  Speech Therapy Consult: No  Support Systems: Parent(s)  Confirm Follow Up Transport: Family  Discharge Location  Patient Expects to be Discharged to[de-identified] Home with family assistance    Readmission Assessment  Number of days since last admission?: 8-30 days  Previous disposition: Home with Family  Who is being interviewed?: Patient  What was the patient's/caregiver's perception as to why they think they needed to return back to the hospital?: Other (Comment) (Symptoms just get \"out of whack\")  Did you visit your Primary Care Physician after you left the hospital, before you returned this time?: Yes  Did you see a specialist, such as Cardiac, Pulmonary, Orthopedic Physician, etc. after you left the hospital?: Yes (Ophthalmology and Urology)  Who advised the patient to return to the hospital?: Self-referral  Does the patient report anything that got in the way of taking their medications?: No  In our efforts to provide the best possible care to you and others like you, can you think of anything that we could have done to help you after you left the hospital the first time, so that you might not have needed to return so soon?: Other (Comment) (Nothing identified at present time)     Alexandria Horn RN, BSN, [address] [place] [address]  Manager of Case Management  [phone]

## 2022-06-10 ENCOUNTER — TELEPHONE (OUTPATIENT)
Dept: PRIMARY CARE CLINIC | Age: 42
End: 2022-06-10

## 2022-06-10 NOTE — TELEPHONE ENCOUNTER
Patient was seen on 6/2/22, went to the ED to [facility] admission on 6/7/22. Is still Very Weak, would you be able to write for these 2 things the rollator and Handicap tag.

## 2022-06-10 NOTE — TELEPHONE ENCOUNTER
Patient calling about getting rollator and handicap sign. Please call patient with how he can get these things.

## 2022-06-13 LAB
BACTERIA SPEC CULT: NORMAL
SERVICE CMNT-IMP: NORMAL

## 2022-06-21 ENCOUNTER — OFFICE VISIT (OUTPATIENT)
Dept: ENDOCRINOLOGY | Age: 42
End: 2022-06-21
Payer: COMMERCIAL

## 2022-06-21 VITALS
HEART RATE: 89 BPM | BODY MASS INDEX: 21.71 KG/M2 | HEIGHT: 69 IN | OXYGEN SATURATION: 100 % | DIASTOLIC BLOOD PRESSURE: 91 MMHG | RESPIRATION RATE: 16 BRPM | SYSTOLIC BLOOD PRESSURE: 145 MMHG | WEIGHT: 146.6 LBS

## 2022-06-21 DIAGNOSIS — E10.65 HYPERGLYCEMIA DUE TO TYPE 1 DIABETES MELLITUS (HCC): Primary | ICD-10-CM

## 2022-06-21 DIAGNOSIS — E10.49 OTHER DIABETIC NEUROLOGICAL COMPLICATION ASSOCIATED WITH TYPE 1 DIABETES MELLITUS (HCC): ICD-10-CM

## 2022-06-21 PROCEDURE — 95251 CONT GLUC MNTR ANALYSIS I&R: CPT | Performed by: INTERNAL MEDICINE

## 2022-06-21 PROCEDURE — 99214 OFFICE O/P EST MOD 30 MIN: CPT | Performed by: INTERNAL MEDICINE

## 2022-06-21 PROCEDURE — 3046F HEMOGLOBIN A1C LEVEL >9.0%: CPT | Performed by: INTERNAL MEDICINE

## 2022-06-21 RX ORDER — GLUCAGON 3 MG/1
1 POWDER NASAL
Qty: 2 EACH | Refills: 2 | Status: SHIPPED | OUTPATIENT
Start: 2022-06-21 | End: 2022-06-21

## 2022-06-21 RX ORDER — BLOOD-GLUCOSE SENSOR
EACH MISCELLANEOUS
Qty: 10 EACH | Refills: 11 | Status: SHIPPED | OUTPATIENT
Start: 2022-06-21

## 2022-06-21 RX ORDER — BLOOD-GLUCOSE TRANSMITTER
EACH MISCELLANEOUS
Qty: 10 EACH | Refills: 3 | Status: SHIPPED | OUTPATIENT
Start: 2022-06-21

## 2022-06-21 RX ORDER — INSULIN GLARGINE 100 [IU]/ML
16 INJECTION, SOLUTION SUBCUTANEOUS DAILY
Qty: 1 ML | Refills: 0 | Status: ON HOLD | OUTPATIENT
Start: 2022-06-21 | End: 2022-09-12 | Stop reason: SDUPTHER

## 2022-06-21 RX ORDER — FLASH GLUCOSE SENSOR
KIT MISCELLANEOUS
Qty: 2 KIT | Refills: 2 | Status: SHIPPED | OUTPATIENT
Start: 2022-06-21 | End: 2022-09-28

## 2022-06-21 RX ORDER — PEN NEEDLE, DIABETIC 30 GX3/16"
NEEDLE, DISPOSABLE MISCELLANEOUS
Qty: 200 EACH | Refills: 11 | Status: SHIPPED | OUTPATIENT
Start: 2022-06-21

## 2022-06-21 RX ORDER — INSULIN ASPART 100 [IU]/ML
INJECTION, SOLUTION INTRAVENOUS; SUBCUTANEOUS
Qty: 15 ML | Refills: 3 | Status: SHIPPED | OUTPATIENT
Start: 2022-06-21 | End: 2022-09-28

## 2022-06-21 RX ORDER — BLOOD-GLUCOSE,RECEIVER,CONT
EACH MISCELLANEOUS
Qty: 1 EACH | Refills: 0 | Status: SHIPPED | OUTPATIENT
Start: 2022-06-21 | End: 2022-07-01 | Stop reason: SDUPTHER

## 2022-06-21 NOTE — PATIENT INSTRUCTIONS
Insulin Instructions    Instructions for Lantus Insulin    This is the long-acting insulin. Inject Lantus at the same time each day. Your current dose is 16 units. If the BG before breakfast is higher than 150 for two days in a row, add TWO units to the Lantus dose. This becomes your new dose. If the BG before breakfast is lower than 90, subtract TWO units from the Lantus dose and that will be your new dose. Continue to adjust the dose by 1-2 units every 2 days until your morning BG is in the target range of . If you should forget to take your Lantus, take your usual dose as soon as you realize it was missed. Gradually work back to taking it at your usual time, moving it by 2 - 3 hours each day. Instructions for Mealtime Novolog  (or Humalog) Insulin    This is the rapid-acting insulin, used at mealtime to prevent a high BG after you eat. Check your BG before each meal.     If your BG is 100 or higher, inject Novolog right before eating.  If your BG is 80 - 100, inject Novolog right after eating.  If your BG is lower than 80, or you have symptoms of a low BG, treat the low BG first, then eat your meal and take the Novolog after eating. The dose of Novolog is based on the amount of carbohydrate in your meal.  Take 1 unit for every 15 grams of carbohydrate: Total carb in meal 15 grams = Novolog 1 unit      30 grams = Novolog 2 units      45 grams = Novolog 3 units      60 grams = Novolog 4 units      75 grams = Novolog 5 units      90 grams = Novolog 6 units        If you should forget to take your Novolog with a meal, take the usual dose if less than an hour has passed since you ate your meal.  If more than an hour has passed since you ate, check your BG and use the correction scale below. Instructions for Correction Novolog Insulin    This is the rapid-acting insulin, used to correct a high BG, and bring it back down to the target.   Inject correction Novolog if your BG is higher than 150 before meals or at bedtime. This dose can be added to the mealtime dose and given all as one injection. At bedtime, take this dose by itself. Your correction dose is 1 unit for every 50 points that your BG is above 150. ADD the following extra insulin if your before-meal BG is higher than 150:  -200 take 1 extra unit  -250 take 2 extra units  -300 take 3 extra units   -350 take 4 extra units   -400 take 5 extra units  -450 take 6 extra units       If you check your BG three - four hours after a meal - it should be in the target of 80 - 150. If the BG is higher than 150, take another dose of Novolog according to the scale above. You may repeat this every 3 - 4 hours until your BG is less than 150. Do not take Novolog doses closer than 3 - 4 hours apart. Call your doctor if your BG does not come down after three extra doses. Treatment of Low Blood Sugar (Hypoglycemia)    If your BG is lower than 80, you are likely to feel shaky, sweaty and lightheaded. This is a signal that your body needs more sugar. Quickly eat or drink a small serving of something sweet, such as:   4 ounces fruit juice or regular (not diet) soda   6 lifesavers   small box of raisins   4 glucose tablets  (~15 gm of glucose)    NB: If your BG is very low <50, you can double the amount above or take 30 gm of glucose gel/tablets. Sit and rest and you should feel better within a few minutes. Once you are feeling better, try to determine why your BG was so low. Common causes of hypoglycemia include skipping a meal, lots of exercise, too much insulin or any combination of these things. Understanding the cause my help you to avoid another low BG in the future. Call your doctor for blood sugars less than 60 or greater than 400 to have your insulin doses adjusted.                Endocrine clinic office             [phone]

## 2022-06-22 RX ORDER — PANTOPRAZOLE SODIUM 40 MG/1
TABLET, DELAYED RELEASE ORAL
Qty: 90 TABLET | Refills: 0 | Status: SHIPPED | OUTPATIENT
Start: 2022-06-22 | End: 2022-07-12

## 2022-06-22 RX ORDER — DULOXETIN HYDROCHLORIDE 60 MG/1
CAPSULE, DELAYED RELEASE ORAL
Qty: 90 CAPSULE | Refills: 0 | Status: SHIPPED | OUTPATIENT
Start: 2022-06-22 | End: 2022-09-13

## 2022-06-25 ENCOUNTER — HOSPITAL ENCOUNTER (EMERGENCY)
Age: 42
Discharge: HOME | End: 2022-06-25
Attending: EMERGENCY MEDICINE
Payer: COMMERCIAL

## 2022-06-25 ENCOUNTER — APPOINTMENT (OUTPATIENT)
Dept: GENERAL RADIOLOGY | Age: 42
End: 2022-06-25
Attending: EMERGENCY MEDICINE
Payer: COMMERCIAL

## 2022-06-25 VITALS
HEIGHT: 69 IN | BODY MASS INDEX: 23.38 KG/M2 | RESPIRATION RATE: 15 BRPM | WEIGHT: 157.85 LBS | DIASTOLIC BLOOD PRESSURE: 91 MMHG | TEMPERATURE: 97.6 F | SYSTOLIC BLOOD PRESSURE: 152 MMHG | HEART RATE: 82 BPM | OXYGEN SATURATION: 98 %

## 2022-06-25 DIAGNOSIS — E16.2 HYPOGLYCEMIA: Primary | ICD-10-CM

## 2022-06-25 DIAGNOSIS — N18.9 CHRONIC KIDNEY DISEASE, UNSPECIFIED CKD STAGE: ICD-10-CM

## 2022-06-25 LAB
ALBUMIN SERPL-MCNC: 1.7 G/DL (ref 3.5–5)
ALBUMIN/GLOB SERPL: 0.4 {RATIO} (ref 1.1–2.2)
ALP SERPL-CCNC: 245 U/L (ref 45–117)
ALT SERPL-CCNC: 37 U/L (ref 12–78)
ANION GAP SERPL CALC-SCNC: 7 MMOL/L (ref 5–15)
APPEARANCE UR: CLEAR
AST SERPL-CCNC: 25 U/L (ref 15–37)
ATRIAL RATE: 68 BPM
BACTERIA URNS QL MICRO: ABNORMAL /HPF
BASOPHILS # BLD: 0.1 K/UL (ref 0–0.1)
BASOPHILS NFR BLD: 1 % (ref 0–1)
BILIRUB SERPL-MCNC: 0.4 MG/DL (ref 0.2–1)
BILIRUB UR QL: NEGATIVE
BUN SERPL-MCNC: 34 MG/DL (ref 6–20)
BUN/CREAT SERPL: 17 (ref 12–20)
CALCIUM SERPL-MCNC: 8.8 MG/DL (ref 8.5–10.1)
CALCULATED P AXIS, ECG09: 48 DEGREES
CALCULATED R AXIS, ECG10: 16 DEGREES
CALCULATED T AXIS, ECG11: 48 DEGREES
CHLORIDE SERPL-SCNC: 92 MMOL/L (ref 97–108)
CO2 SERPL-SCNC: 30 MMOL/L (ref 21–32)
COLOR UR: ABNORMAL
CREAT SERPL-MCNC: 2.03 MG/DL (ref 0.7–1.3)
DIAGNOSIS, 93000: NORMAL
DIFFERENTIAL METHOD BLD: ABNORMAL
EOSINOPHIL # BLD: 0.1 K/UL (ref 0–0.4)
EOSINOPHIL NFR BLD: 1 % (ref 0–7)
EPITH CASTS URNS QL MICRO: ABNORMAL /LPF
ERYTHROCYTE [DISTWIDTH] IN BLOOD BY AUTOMATED COUNT: 13.9 % (ref 11.5–14.5)
GLOBULIN SER CALC-MCNC: 4.4 G/DL (ref 2–4)
GLUCOSE BLD STRIP.AUTO-MCNC: 128 MG/DL (ref 65–117)
GLUCOSE BLD STRIP.AUTO-MCNC: 64 MG/DL (ref 65–117)
GLUCOSE BLD STRIP.AUTO-MCNC: 65 MG/DL (ref 65–117)
GLUCOSE BLD STRIP.AUTO-MCNC: 67 MG/DL (ref 65–117)
GLUCOSE BLD STRIP.AUTO-MCNC: 75 MG/DL (ref 65–117)
GLUCOSE BLD STRIP.AUTO-MCNC: 80 MG/DL (ref 65–117)
GLUCOSE BLD STRIP.AUTO-MCNC: 83 MG/DL (ref 65–117)
GLUCOSE SERPL-MCNC: 79 MG/DL (ref 65–100)
GLUCOSE UR STRIP.AUTO-MCNC: 500 MG/DL
GRAN CASTS URNS QL MICRO: ABNORMAL /LPF
HCT VFR BLD AUTO: 32.2 % (ref 36.6–50.3)
HGB BLD-MCNC: 11.5 G/DL (ref 12.1–17)
HGB UR QL STRIP: ABNORMAL
IMM GRANULOCYTES # BLD AUTO: 0 K/UL (ref 0–0.04)
IMM GRANULOCYTES NFR BLD AUTO: 0 % (ref 0–0.5)
KETONES UR QL STRIP.AUTO: NEGATIVE MG/DL
LEUKOCYTE ESTERASE UR QL STRIP.AUTO: ABNORMAL
LYMPHOCYTES # BLD: 1.9 K/UL (ref 0.8–3.5)
LYMPHOCYTES NFR BLD: 20 % (ref 12–49)
MCH RBC QN AUTO: 29.5 PG (ref 26–34)
MCHC RBC AUTO-ENTMCNC: 35.7 G/DL (ref 30–36.5)
MCV RBC AUTO: 82.6 FL (ref 80–99)
MONOCYTES # BLD: 0.6 K/UL (ref 0–1)
MONOCYTES NFR BLD: 7 % (ref 5–13)
NEUTS SEG # BLD: 6.8 K/UL (ref 1.8–8)
NEUTS SEG NFR BLD: 71 % (ref 32–75)
NITRITE UR QL STRIP.AUTO: NEGATIVE
NRBC # BLD: 0 K/UL (ref 0–0.01)
NRBC BLD-RTO: 0 PER 100 WBC
P-R INTERVAL, ECG05: 168 MS
PH UR STRIP: 6.5 [PH] (ref 5–8)
PLATELET # BLD AUTO: 405 K/UL (ref 150–400)
PMV BLD AUTO: 10.7 FL (ref 8.9–12.9)
POTASSIUM SERPL-SCNC: 3.6 MMOL/L (ref 3.5–5.1)
PROT SERPL-MCNC: 6.1 G/DL (ref 6.4–8.2)
PROT UR STRIP-MCNC: >300 MG/DL
Q-T INTERVAL, ECG07: 458 MS
QRS DURATION, ECG06: 96 MS
QTC CALCULATION (BEZET), ECG08: 487 MS
RBC # BLD AUTO: 3.9 M/UL (ref 4.1–5.7)
RBC #/AREA URNS HPF: ABNORMAL /HPF (ref 0–5)
SERVICE CMNT-IMP: ABNORMAL
SERVICE CMNT-IMP: ABNORMAL
SERVICE CMNT-IMP: NORMAL
SODIUM SERPL-SCNC: 129 MMOL/L (ref 136–145)
SP GR UR REFRACTOMETRY: 1.02
TROPONIN-HIGH SENSITIVITY: 10 NG/L (ref 0–76)
UA: UC IF INDICATED,UAUC: ABNORMAL
UROBILINOGEN UR QL STRIP.AUTO: 0.2 EU/DL (ref 0.2–1)
VENTRICULAR RATE, ECG03: 68 BPM
WBC # BLD AUTO: 9.6 K/UL (ref 4.1–11.1)
WBC URNS QL MICRO: ABNORMAL /HPF (ref 0–4)

## 2022-06-25 PROCEDURE — 71045 X-RAY EXAM CHEST 1 VIEW: CPT

## 2022-06-25 PROCEDURE — 36415 COLL VENOUS BLD VENIPUNCTURE: CPT

## 2022-06-25 PROCEDURE — 84484 ASSAY OF TROPONIN QUANT: CPT

## 2022-06-25 PROCEDURE — 99285 EMERGENCY DEPT VISIT HI MDM: CPT

## 2022-06-25 PROCEDURE — 93005 ELECTROCARDIOGRAM TRACING: CPT

## 2022-06-25 PROCEDURE — 80053 COMPREHEN METABOLIC PANEL: CPT

## 2022-06-25 PROCEDURE — 85025 COMPLETE CBC W/AUTO DIFF WBC: CPT

## 2022-06-25 PROCEDURE — 87086 URINE CULTURE/COLONY COUNT: CPT

## 2022-06-25 PROCEDURE — 82962 GLUCOSE BLOOD TEST: CPT

## 2022-06-25 PROCEDURE — 74011250637 HC RX REV CODE- 250/637: Performed by: EMERGENCY MEDICINE

## 2022-06-25 PROCEDURE — 96374 THER/PROPH/DIAG INJ IV PUSH: CPT

## 2022-06-25 PROCEDURE — 74011250636 HC RX REV CODE- 250/636: Performed by: EMERGENCY MEDICINE

## 2022-06-25 PROCEDURE — 81001 URINALYSIS AUTO W/SCOPE: CPT

## 2022-06-25 RX ORDER — CEPHALEXIN 500 MG/1
500 CAPSULE ORAL 4 TIMES DAILY
Qty: 20 CAPSULE | Refills: 0 | Status: SHIPPED | OUTPATIENT
Start: 2022-06-25 | End: 2022-06-30

## 2022-06-25 RX ORDER — OXYCODONE AND ACETAMINOPHEN 5; 325 MG/1; MG/1
1 TABLET ORAL
Status: COMPLETED | OUTPATIENT
Start: 2022-06-25 | End: 2022-06-25

## 2022-06-25 RX ORDER — KETOROLAC TROMETHAMINE 30 MG/ML
15 INJECTION, SOLUTION INTRAMUSCULAR; INTRAVENOUS
Status: COMPLETED | OUTPATIENT
Start: 2022-06-25 | End: 2022-06-25

## 2022-06-25 RX ADMIN — OXYCODONE HYDROCHLORIDE AND ACETAMINOPHEN 1 TABLET: 5; 325 TABLET ORAL at 09:44

## 2022-06-25 RX ADMIN — KETOROLAC TROMETHAMINE 15 MG: 30 INJECTION, SOLUTION INTRAMUSCULAR at 07:42

## 2022-06-25 NOTE — ED NOTES
Shift change report given to Mahad Pantoja RN (oncoming nurse) by Mauri Peralta (offgoing nurse). Report included the following information SBAR, Kardex, ED Summary, Intake/Output, MAR, Recent Results and Cardiac Rhythm sinus.

## 2022-06-25 NOTE — ED NOTES
Pt temp rectally was 92.8. Dr. Nicole Pantoja notified and ordered a bear hugger. Pt was also given 8 ounces of OJ for a bs or 64.

## 2022-06-25 NOTE — ED NOTES
I have reviewed discharge instructions with the patient. The patient verbalized understanding. Mother at bedside. Water provided. Pt assisted to w/c and to private vehicle by ED tech.

## 2022-06-25 NOTE — DISCHARGE INSTRUCTIONS
You were seen in the ER for your symptoms. Please follow-up with your primary care doctor. Return for new or worsening symptoms at any time. Please continue to monitor your blood sugar. Please take the antibiotics for the bacteria in your urine.

## 2022-06-25 NOTE — ED PROVIDER NOTES
EMERGENCY DEPARTMENT HISTORY AND PHYSICAL EXAM      Date: 6/25/2022  Patient Name: Jb Ontiveros    History of Presenting Illness     Chief Complaint   Patient presents with    Low Blood Sugar     Pt arrives via EMS from home after family called d/t pt being unresponsive. When EMS arrives, pt's BS was 29 and EMS gave 250 mL of D10 which brought BS to 178. Pt now A&O X4. Pt complains of chest pain after EMS sternal rubbed him. History Provided By: Patient    HPI: Jb Ontiveros, 46 y.o. male presents to the ED with cc of hypoglycemia. 26-year-old male with a past medical history notable for type 1 diabetes, CKD, bipolar disorder presents with low blood sugar. Patient arrives via EMS, family called the patient being unresponsive. Blood glucose 29, given D10 with improvement. Patient reports in his normal state of health. Last took Lantus last night, 16 units. Reports woke up to paramedics over top of him. Patient denies any recent fevers but does feel \"cold. \"  Denies any nausea, vomiting or diarrhea. Denies any shortness of breath. Does report some chest pain. EMS reportedly sternal rub patient. Patient reports a mild substernal chest pain with no alleviating or aggravating factors. Without associated symptoms. Patient follows with endocrinology, on Lantus 16 units, basal.  Sliding scale NovoLog. There are no other complaints, changes, or physical findings at this time. PCP: Joanna Robbins MD    No current facility-administered medications on file prior to encounter.      Current Outpatient Medications on File Prior to Encounter   Medication Sig Dispense Refill    DULoxetine (CYMBALTA) 60 mg capsule Take 1 capsule by mouth once daily 90 Capsule 0    pantoprazole (PROTONIX) 40 mg tablet Take 1 tablet by mouth once daily 90 Tablet 0    Dexcom G6  misc Use as directed 1 Each 0    Dexcom G6 Sensor brandi Use as directed every 10 days 10 Each 11    Dexcom G6 Transmitter brandi Use as directed 10 Each 3    FreeStyle Connor 14 Day Sensor kit Use as directed every 14 days 2 Kit 2    Insulin Needles, Disposable, 31 gauge x 5/16\" ndle Dx code E11.65, use 6x daily 200 Each 11    insulin glargine (LANTUS) 100 unit/mL injection 16 Units by SubCUTAneous route daily. 1 mL 0    insulin aspart U-100 (NovoLOG Flexpen U-100 Insulin) 100 unit/mL (3 mL) inpn Take 1 unit for every 15 grams of carbohydrates, 1 unit for every 50 points >150. Max daily dose 25U. 15 mL 3    rosuvastatin (CRESTOR) 20 mg tablet Take 1 Tablet by mouth nightly. 90 Tablet 0    oxyCODONE IR (Roxicodone) 5 mg immediate release tablet Take 1 Tablet by mouth nightly as needed for Pain for up to 30 days. Max Daily Amount: 5 mg. (Patient not taking: Reported on 6/21/2022) 15 Tablet 0    amLODIPine (NORVASC) 5 mg tablet Take 1 Tablet by mouth daily. 30 Tablet 1    carvediloL (COREG) 12.5 mg tablet Take 1 Tablet by mouth two (2) times daily (with meals). 60 Tablet 1    mupirocin (BACTROBAN) 2 % ointment Apply  to affected area daily. Apply until symptoms resolve 22 g 0    tamsulosin (FLOMAX) 0.4 mg capsule Take 0.4 mg by mouth daily.  naloxone (Narcan) 4 mg/actuation nasal spray Use 1 spray intranasally, then discard. Repeat with new spray every 2 min as needed for opioid overdose symptoms, alternating nostrils. 1 Each 0    pen needle, diabetic 30 gauge x 3/16\" ndle 5 Units by Does Not Apply route Before breakfast, lunch, and dinner. 100 Each 3    finasteride (PROSCAR) 5 mg tablet Take 1 Tablet by mouth daily. 30 Tablet 2    ondansetron (ZOFRAN ODT) 4 mg disintegrating tablet Take 1 Tablet by mouth every eight (8) hours as needed for Nausea or Vomiting. 20 Tablet 0    ergocalciferol (ERGOCALCIFEROL) 1,250 mcg (50,000 unit) capsule Take 1 capsule by mouth once a week (Patient not taking: Reported on 5/10/2022) 12 Capsule 0    pregabalin (Lyrica) 75 mg capsule Take 75 mg by mouth two (2) times a day.          Past History     Past Medical History:  Past Medical History:   Diagnosis Date    Bipolar 1 disorder, depressed ([address]     Bipolar disorder ([address]     Chronic kidney disease, stage 3a ([address]     Depression     Diabetes ([address]     DKA, type 1 ([address] 2013    diagnosed age 23    DKA, type 1 ([address]     H/O noncompliance with medical treatment, presenting hazards to health     MRSA (methicillin resistant staph aureus) culture positive     MRSA (methicillin resistant Staphylococcus aureus)     Face    Noncompliance with medication regimen     Second hand smoke exposure     Seizure ([address]     Seizures ([address] 2006 or 2007    one episode during group home       Past Surgical History:  Past Surgical History:   Procedure Laterality Date    HX HEENT      top left wisdom tooth    HX ORTHOPAEDIC Left     wrist; MCV    UPPER GI ENDOSCOPY,BIOPSY  2018            Family History:  Family History   Problem Relation Age of Onset    Diabetes Mother     Diabetes Other         neice, type 1        Social History:  Social History     Tobacco Use    Smoking status: Former Smoker     Packs/day: 0.10     Years: 16.00     Pack years: 1.60     Types: Cigarettes     Start date: 10/4/1999     Quit date: 2020     Years since quittin.3    Smokeless tobacco: Never Used   Vaping Use    Vaping Use: Never used   Substance Use Topics    Alcohol use: No    Drug use: No       Allergies:  No Known Allergies      Review of Systems   Review of Systems   Constitutional: Negative for chills and fever. HENT: Negative for voice change. Eyes: Negative for pain and redness. Respiratory: Negative for cough, chest tightness and shortness of breath. Cardiovascular: Positive for chest pain. Negative for leg swelling. Gastrointestinal: Negative for abdominal pain, diarrhea, nausea and vomiting. Genitourinary: Negative for hematuria. Musculoskeletal: Negative for gait problem. Skin: Negative for color change, pallor and rash. Neurological: Negative for facial asymmetry, weakness and headaches. Hematological: Does not bruise/bleed easily. Psychiatric/Behavioral: Negative for behavioral problems. All other systems reviewed and are negative. Physical Exam   Physical Exam  Vitals and nursing note reviewed. Constitutional:       Comments: 46 YOM, resting in bed, non-toxic, no distress   HENT:      Head: Normocephalic and atraumatic. Nose: Nose normal.      Mouth/Throat:      Mouth: Mucous membranes are moist.   Eyes:      Pupils: Pupils are equal, round, and reactive to light. Cardiovascular:      Rate and Rhythm: Normal rate and regular rhythm. Pulses: Normal pulses. Heart sounds: No murmur heard. No friction rub. No gallop. Pulmonary:      Effort: Pulmonary effort is normal.      Breath sounds: Normal breath sounds. No wheezing, rhonchi or rales. Abdominal:      General: Abdomen is flat. There is no distension. Palpations: Abdomen is soft. Tenderness: There is no abdominal tenderness. Musculoskeletal:         General: Normal range of motion. Cervical back: Normal range of motion. Right lower leg: No edema. Left lower leg: No edema. Skin:     General: Skin is warm and dry. Capillary Refill: Capillary refill takes less than 2 seconds. Neurological:      General: No focal deficit present. Mental Status: He is alert.    Psychiatric:         Mood and Affect: Mood normal.         Diagnostic Study Results     Labs -     Recent Results (from the past 12 hour(s))   GLUCOSE, POC    Collection Time: 06/25/22  6:16 AM   Result Value Ref Range    Glucose (POC) 80 65 - 117 mg/dL    Performed by Mikie REHMAN    EKG, 12 LEAD, INITIAL    Collection Time: 06/25/22  6:48 AM   Result Value Ref Range    Ventricular Rate 68 BPM    Atrial Rate 68 BPM    P-R Interval 168 ms    QRS Duration 96 ms    Q-T Interval 458 ms    QTC Calculation (Bezet) 487 ms    Calculated P Axis 48 degrees Calculated R Axis 16 degrees    Calculated T Axis 48 degrees    Diagnosis       Normal sinus rhythm  Possible Left atrial enlargement  When compared with ECG of 07-JUN-2022 17:11,  No significant change was found  Confirmed by Kristian Knapp ([id number]) on 6/25/2022 10:49:07 AM     CBC WITH AUTOMATED DIFF    Collection Time: 06/25/22  6:52 AM   Result Value Ref Range    WBC 9.6 4.1 - 11.1 K/uL    RBC 3.90 (L) 4.10 - 5.70 M/uL    HGB 11.5 (L) 12.1 - 17.0 g/dL    HCT 32.2 (L) 36.6 - 50.3 %    MCV 82.6 80.0 - 99.0 FL    MCH 29.5 26.0 - 34.0 PG    MCHC 35.7 30.0 - 36.5 g/dL    RDW 13.9 11.5 - 14.5 %    PLATELET 371 (H) 073 - 400 K/uL    MPV 10.7 8.9 - 12.9 FL    NRBC 0.0 0  WBC    ABSOLUTE NRBC 0.00 0.00 - 0.01 K/uL    NEUTROPHILS 71 32 - 75 %    LYMPHOCYTES 20 12 - 49 %    MONOCYTES 7 5 - 13 %    EOSINOPHILS 1 0 - 7 %    BASOPHILS 1 0 - 1 %    IMMATURE GRANULOCYTES 0 0.0 - 0.5 %    ABS. NEUTROPHILS 6.8 1.8 - 8.0 K/UL    ABS. LYMPHOCYTES 1.9 0.8 - 3.5 K/UL    ABS. MONOCYTES 0.6 0.0 - 1.0 K/UL    ABS. EOSINOPHILS 0.1 0.0 - 0.4 K/UL    ABS. BASOPHILS 0.1 0.0 - 0.1 K/UL    ABS. IMM. GRANS. 0.0 0.00 - 0.04 K/UL    DF AUTOMATED     METABOLIC PANEL, COMPREHENSIVE    Collection Time: 06/25/22  6:52 AM   Result Value Ref Range    Sodium 129 (L) 136 - 145 mmol/L    Potassium 3.6 3.5 - 5.1 mmol/L    Chloride 92 (L) 97 - 108 mmol/L    CO2 30 21 - 32 mmol/L    Anion gap 7 5 - 15 mmol/L    Glucose 79 65 - 100 mg/dL    BUN 34 (H) 6 - 20 MG/DL    Creatinine 2.03 (H) 0.70 - 1.30 MG/DL    BUN/Creatinine ratio 17 12 - 20      GFR est AA 45 (L) >60 ml/min/1.73m2    GFR est non-AA 37 (L) >60 ml/min/1.73m2    Calcium 8.8 8.5 - 10.1 MG/DL    Bilirubin, total 0.4 0.2 - 1.0 MG/DL    ALT (SGPT) 37 12 - 78 U/L    AST (SGOT) 25 15 - 37 U/L    Alk.  phosphatase 245 (H) 45 - 117 U/L    Protein, total 6.1 (L) 6.4 - 8.2 g/dL    Albumin 1.7 (L) 3.5 - 5.0 g/dL    Globulin 4.4 (H) 2.0 - 4.0 g/dL    A-G Ratio 0.4 (L) 1.1 - 2.2     TROPONIN-HIGH SENSITIVITY    Collection Time: 06/25/22  6:52 AM   Result Value Ref Range    Troponin-High Sensitivity 10 0 - 76 ng/L   GLUCOSE, POC    Collection Time: 06/25/22  7:39 AM   Result Value Ref Range    Glucose (POC) 65 65 - 117 mg/dL    Performed by Redd Phan, POC    Collection Time: 06/25/22  7:41 AM   Result Value Ref Range    Glucose (POC) 64 (L) 65 - 117 mg/dL    Performed by Redd Phan, POC    Collection Time: 06/25/22  8:18 AM   Result Value Ref Range    Glucose (POC) 67 65 - 117 mg/dL    Performed by Ashley Roman RN    URINALYSIS W/ REFLEX CULTURE    Collection Time: 06/25/22  8:50 AM    Specimen: Urine   Result Value Ref Range    Color YELLOW/STRAW      Appearance CLEAR CLEAR      Specific gravity 1.017      pH (UA) 6.5 5.0 - 8.0      Protein >300 (A) NEG mg/dL    Glucose 500 (A) NEG mg/dL    Ketone Negative NEG mg/dL    Bilirubin Negative NEG      Blood MODERATE (A) NEG      Urobilinogen 0.2 0.2 - 1.0 EU/dL    Nitrites Negative NEG      Leukocyte Esterase SMALL (A) NEG      WBC 20-50 0 - 4 /hpf    RBC 0-5 0 - 5 /hpf    Epithelial cells FEW FEW /lpf    Bacteria 1+ (A) NEG /hpf    UA:UC IF INDICATED URINE CULTURE ORDERED (A) CNI      Granular cast 2-5 (A) NEG /lpf   GLUCOSE, POC    Collection Time: 06/25/22  8:58 AM   Result Value Ref Range    Glucose (POC) 75 65 - 117 mg/dL    Performed by Ashley Roman RN    GLUCOSE, POC    Collection Time: 06/25/22  9:19 AM   Result Value Ref Range    Glucose (POC) 83 65 - 117 mg/dL    Performed by Ashley Roman RN    GLUCOSE, POC    Collection Time: 06/25/22 10:22 AM   Result Value Ref Range    Glucose (POC) 128 (H) 65 - 117 mg/dL    Performed by Ashley Roman RN        Radiologic Studies -   XR CHEST PORT   Final Result   No acute process. CT Results  (Last 48 hours)    None        CXR Results  (Last 48 hours)               06/25/22 0644  XR CHEST PORT Final result    Impression:  No acute process.        Narrative:  INDICATION: cehst pain       EXAM:  AP CHEST RADIOGRAPH       COMPARISON: June 7, 2022       FINDINGS:       AP portable view of the chest demonstrates a normal cardiomediastinal   silhouette. There is no edema, effusion, consolidation, or pneumothorax. The   osseous structures are unremarkable. Medical Decision Making   I am the first provider for this patient. I reviewed the vital signs, available nursing notes, past medical history, past surgical history, family history and social history. Vital Signs-Reviewed the patient's vital signs. Patient Vitals for the past 12 hrs:   Temp Pulse Resp BP SpO2   06/25/22 1115 -- 82 15 (!) 152/91 98 %   06/25/22 1055 -- 82 17 (!) 151/97 99 %   06/25/22 1025 -- 82 13 (!) 172/109 99 %   06/25/22 1024 97.6 °F (36.4 °C) -- -- -- --   06/25/22 0958 -- 82 14 (!) 167/99 100 %   06/25/22 0925 (!) 93.5 °F (34.2 °C) -- -- -- 100 %   06/25/22 0813 -- 72 12 (!) 188/108 100 %   06/25/22 0753 (!) 92.8 °F (33.8 °C) -- -- -- --   06/25/22 0658 -- 67 10 (!) 188/110 100 %   06/25/22 0613 -- 66 22 (!) 202/118 100 %     Records Reviewed: Nursing Notes and Old Medical Records    Provider Notes (Medical Decision Making):     22-year-old male presents emergency department with a chief complaint of hyperglycemia. Does report feeling \"cold\" suspect this is in the setting of patient's hypoglycemia. Vitals notable for hypertension and this is also likely in setting of patient's hyperglycemia and adrenergic surge. Suspect iatrogenic hypoglycemia, check basic labs. Chest pain is likely secondary to patient's sternal rub he received, but is not reproducible, although atypical given his history of diabetes will check screening EKG, troponin and chest x-ray. The patient is able to tolerate p.o. and blood glucose remains normal, patient can likely be discharged. ED Course:   Initial assessment performed.  The patients presenting problems have been discussed, and they are in agreement with the care plan formulated and outlined with them. I have encouraged them to ask questions as they arise throughout their visit. ED Course as of 06/25/22 1435   Sat Jun 25, 2022   6753 EKG interpreted by me. Shows NSR with a HR of 68. No ST elevations or depressions concerning for ischemia. Normal intervals. [MB]   3856 CBC was stable anemia likely secondary to CKD. CMP with stable hyponatremia, stable and improving CKD. Otherwise unremarkable. Troponin baseline. [MB]   3235 Patient's rectal temperature 92 in the setting of hyperglycemia and not sepsis. Will place bear hugger. [MB]   6558 Reassessed. Now normothermic, Glucose remains stable. Will d/c with abx for bacteruria. [MB]      ED Course User Index  [MB] MD Sagrario Boggs MD      Disposition:    Discharged    DISCHARGE PLAN:  1. Discharge Medication List as of 6/25/2022 10:29 AM      START taking these medications    Details   cephALEXin (Keflex) 500 mg capsule Take 1 Capsule by mouth four (4) times daily for 5 days. , Normal, Disp-20 Capsule, R-0         CONTINUE these medications which have NOT CHANGED    Details   DULoxetine (CYMBALTA) 60 mg capsule Take 1 capsule by mouth once daily, Normal, Disp-90 Capsule, R-0      pantoprazole (PROTONIX) 40 mg tablet Take 1 tablet by mouth once daily, Normal, Disp-90 Tablet, R-0      Dexcom G6  misc Use as directed, Normal, Disp-1 Each, R-0, [id number] dx code E10.65      Dexcom G6 Sensor brandi Use as directed every 10 days, Normal, Disp-10 Each, R-11, [id number] dx code E10.65      Dexcom G6 Transmitter brandi Use as directed, Normal, Disp-10 Each, R-3, [id number] dx code E10.65      FreeStyle Connor 14 Day Sensor kit Use as directed every 14 days, Normal, Disp-2 Kit, R-2, PADMA      Insulin Needles, Disposable, 31 gauge x 5/16\" ndle Dx code E11.65, use 6x daily, Normal, Disp-200 Each, R-11      insulin glargine (LANTUS) 100 unit/mL injection 16 Units by SubCUTAneous route daily. , Normal, Disp-1 mL, R-0      insulin aspart U-100 (NovoLOG Flexpen U-100 Insulin) 100 unit/mL (3 mL) inpn Take 1 unit for every 15 grams of carbohydrates, 1 unit for every 50 points >150. Max daily dose 25U., Normal, Disp-15 mL, R-3      rosuvastatin (CRESTOR) 20 mg tablet Take 1 Tablet by mouth nightly., Normal, Disp-90 Tablet, R-0      oxyCODONE IR (Roxicodone) 5 mg immediate release tablet Take 1 Tablet by mouth nightly as needed for Pain for up to 30 days. Max Daily Amount: 5 mg., Normal, Disp-15 Tablet, R-0      amLODIPine (NORVASC) 5 mg tablet Take 1 Tablet by mouth daily. , Normal, Disp-30 Tablet, R-1      carvediloL (COREG) 12.5 mg tablet Take 1 Tablet by mouth two (2) times daily (with meals). , Normal, Disp-60 Tablet, R-1      mupirocin (BACTROBAN) 2 % ointment Apply  to affected area daily. Apply until symptoms resolve, Normal, Disp-22 g, R-0      tamsulosin (FLOMAX) 0.4 mg capsule Take 0.4 mg by mouth daily. , Historical Med      naloxone (Narcan) 4 mg/actuation nasal spray Use 1 spray intranasally, then discard. Repeat with new spray every 2 min as needed for opioid overdose symptoms, alternating nostrils. , Normal, Disp-1 Each, R-0      pen needle, diabetic 30 gauge x 3/16\" ndle 5 Units by Does Not Apply route Before breakfast, lunch, and dinner., Normal, Disp-100 Each, R-3      finasteride (PROSCAR) 5 mg tablet Take 1 Tablet by mouth daily. , Normal, Disp-30 Tablet, R-2      ondansetron (ZOFRAN ODT) 4 mg disintegrating tablet Take 1 Tablet by mouth every eight (8) hours as needed for Nausea or Vomiting., Normal, Disp-20 Tablet, R-0      ergocalciferol (ERGOCALCIFEROL) 1,250 mcg (50,000 unit) capsule Take 1 capsule by mouth once a week, Normal, Disp-12 Capsule, R-0      pregabalin (Lyrica) 75 mg capsule Take 75 mg by mouth two (2) times a day., Historical Med           2.    Follow-up Information     Follow up With Specialties Details Why Osvaldo Medrano MD Internal Medicine Physician In 3 days  [address]       [address] DEPT Emergency Medicine  If symptoms worsen [address] [facility] [address]   [address] [address] [phone]        3. Return to ED if worse     Diagnosis     Clinical Impression:   1. Hypoglycemia    2. Chronic kidney disease, unspecified CKD stage        Attestations:    Fatimah Frost MD    Please note that this dictation was completed with [employer], the computer voice recognition software. Quite often unanticipated grammatical, syntax, homophones, and other interpretive errors are inadvertently transcribed by the computer software. Please disregard these errors. Please excuse any errors that have escaped final proofreading. Thank you.

## 2022-06-26 LAB
BACTERIA SPEC CULT: NORMAL
SERVICE CMNT-IMP: NORMAL

## 2022-06-28 ENCOUNTER — TELEPHONE (OUTPATIENT)
Dept: ENDOCRINOLOGY | Age: 42
End: 2022-06-28

## 2022-06-28 NOTE — TELEPHONE ENCOUNTER
[address] [facility] called 6/28 @ 2:11pm. Stated the order they received for the dexcom  can not be processed. They would like the order re submitted with specific directions.  It should read with how many times he should be checking instead of saying \"used as directed\"    Pharmacy# [phone]

## 2022-06-28 NOTE — TELEPHONE ENCOUNTER
Received a fax with the below request and I replied back via fax that the dexcom rx was [facility] and requested to d/c the rx that they received.

## 2022-07-01 RX ORDER — BLOOD-GLUCOSE,RECEIVER,CONT
EACH MISCELLANEOUS
Qty: 1 EACH | Refills: 0 | Status: SHIPPED | OUTPATIENT
Start: 2022-07-01

## 2022-07-01 NOTE — TELEPHONE ENCOUNTER
Pt called and LVM 7/1 @ 12:54pm. Pt stated he was returning a missed call he had.     Pt# [phone]

## 2022-07-01 NOTE — TELEPHONE ENCOUNTER
Spoke with Agnes Hubbard, pharmacy tech at [facility] and she stated they are having a hard time with getting an override for the dexcom , but the transmitter and sensors are ready for . Agnes Hubbard was made aware that I needed to reach out to the pt to see whether or not he's able to download the dexcom/ clarity renetta on his phone and if he is, the  will not be  needed. I then tried to reach the pt but the call went directly to his voicemail. I left a message making the pt aware that I would be leaving the office early today and requested for an asap return call in regards to the dexcom device. I then called and left a message with Benjamin Pimentel (emergence contact) and requested to have pt to call my direct line before   12 noon today.

## 2022-07-01 NOTE — TELEPHONE ENCOUNTER
7/1/2022    The pharmacist at [address] called yesterday 6/30 and left a vm at 4:57 pm stating he is calling regarding the pt's [address] The directions on the prescription has \"used as directed\". The insurance won't cover it with those directions. He would like Dr. Adrienne Park to resend a prescription via electronic or fax stating the pt's testing frequency so the insurance will cover it. They can be reached at [phone] and fax# [phone].     Thanks,   Abbi Wills Pt arrives with c/o left sided back pain \"feels like a muscle is pulling.\" Symptoms started 3 days ago. Pt had a slip in fall last year, back pain started after this injury.

## 2022-07-10 ENCOUNTER — HOSPITAL ENCOUNTER (OUTPATIENT)
Age: 42
Setting detail: OBSERVATION
Discharge: HOME | End: 2022-07-12
Attending: EMERGENCY MEDICINE | Admitting: INTERNAL MEDICINE
Payer: MEDICAID

## 2022-07-10 ENCOUNTER — APPOINTMENT (OUTPATIENT)
Dept: CT IMAGING | Age: 42
End: 2022-07-10
Attending: EMERGENCY MEDICINE
Payer: MEDICAID

## 2022-07-10 DIAGNOSIS — R73.9 HYPERGLYCEMIA: ICD-10-CM

## 2022-07-10 DIAGNOSIS — R11.2 INTRACTABLE NAUSEA AND VOMITING: Primary | ICD-10-CM

## 2022-07-10 PROBLEM — E11.65 HYPERGLYCEMIA DUE TO DIABETES MELLITUS (HCC): Status: ACTIVE | Noted: 2022-01-01

## 2022-07-10 LAB
ALBUMIN SERPL-MCNC: 1.5 G/DL (ref 3.5–5)
ALBUMIN/GLOB SERPL: 0.4 {RATIO} (ref 1.1–2.2)
ALP SERPL-CCNC: 149 U/L (ref 45–117)
ALT SERPL-CCNC: 28 U/L (ref 12–78)
ANION GAP SERPL CALC-SCNC: 13 MMOL/L (ref 5–15)
APPEARANCE UR: ABNORMAL
AST SERPL-CCNC: 18 U/L (ref 15–37)
BACTERIA URNS QL MICRO: NEGATIVE /HPF
BILIRUB SERPL-MCNC: 0.2 MG/DL (ref 0.2–1)
BILIRUB UR QL: NEGATIVE
BUN SERPL-MCNC: 43 MG/DL (ref 6–20)
BUN/CREAT SERPL: 13 (ref 12–20)
CALCIUM SERPL-MCNC: 8.9 MG/DL (ref 8.5–10.1)
CHLORIDE SERPL-SCNC: 102 MMOL/L (ref 97–108)
CO2 SERPL-SCNC: 17 MMOL/L (ref 21–32)
COLOR UR: ABNORMAL
COMMENT, HOLDF: NORMAL
CREAT SERPL-MCNC: 3.44 MG/DL (ref 0.7–1.3)
EPITH CASTS URNS QL MICRO: ABNORMAL /LPF
ERYTHROCYTE [DISTWIDTH] IN BLOOD BY AUTOMATED COUNT: 15.2 % (ref 11.5–14.5)
EST. AVERAGE GLUCOSE BLD GHB EST-MCNC: 301 MG/DL
GLOBULIN SER CALC-MCNC: 4.2 G/DL (ref 2–4)
GLUCOSE BLD STRIP.AUTO-MCNC: 355 MG/DL (ref 65–117)
GLUCOSE BLD STRIP.AUTO-MCNC: 431 MG/DL (ref 65–117)
GLUCOSE BLD STRIP.AUTO-MCNC: 437 MG/DL (ref 65–117)
GLUCOSE BLD STRIP.AUTO-MCNC: 450 MG/DL (ref 65–117)
GLUCOSE SERPL-MCNC: 465 MG/DL (ref 65–100)
GLUCOSE UR STRIP.AUTO-MCNC: >1000 MG/DL
GRAN CASTS URNS QL MICRO: ABNORMAL /LPF
HBA1C MFR BLD: 12.1 % (ref 4–5.6)
HCT VFR BLD AUTO: 31.7 % (ref 36.6–50.3)
HGB BLD-MCNC: 10.8 G/DL (ref 12.1–17)
HGB UR QL STRIP: ABNORMAL
HYALINE CASTS URNS QL MICRO: ABNORMAL /LPF (ref 0–5)
KETONES UR QL STRIP.AUTO: 15 MG/DL
LEUKOCYTE ESTERASE UR QL STRIP.AUTO: NEGATIVE
LIPASE SERPL-CCNC: 56 U/L (ref 73–393)
MCH RBC QN AUTO: 28.7 PG (ref 26–34)
MCHC RBC AUTO-ENTMCNC: 34.1 G/DL (ref 30–36.5)
MCV RBC AUTO: 84.3 FL (ref 80–99)
MIXED CELL CASTS URNS QL MICRO: ABNORMAL /LPF
NITRITE UR QL STRIP.AUTO: NEGATIVE
NRBC # BLD: 0 K/UL (ref 0–0.01)
NRBC BLD-RTO: 0 PER 100 WBC
PH UR STRIP: 6 [PH] (ref 5–8)
PLATELET # BLD AUTO: 508 K/UL (ref 150–400)
PMV BLD AUTO: 9.5 FL (ref 8.9–12.9)
POTASSIUM SERPL-SCNC: 3.8 MMOL/L (ref 3.5–5.1)
PROT SERPL-MCNC: 5.7 G/DL (ref 6.4–8.2)
PROT UR STRIP-MCNC: >300 MG/DL
RBC # BLD AUTO: 3.76 M/UL (ref 4.1–5.7)
RBC #/AREA URNS HPF: ABNORMAL /HPF (ref 0–5)
SAMPLES BEING HELD,HOLD: NORMAL
SERVICE CMNT-IMP: ABNORMAL
SODIUM SERPL-SCNC: 132 MMOL/L (ref 136–145)
SP GR UR REFRACTOMETRY: 1.02
UA: UC IF INDICATED,UAUC: ABNORMAL
UROBILINOGEN UR QL STRIP.AUTO: 0.2 EU/DL (ref 0.2–1)
WBC # BLD AUTO: 11.4 K/UL (ref 4.1–11.1)
WBC URNS QL MICRO: ABNORMAL /HPF (ref 0–4)

## 2022-07-10 PROCEDURE — 83690 ASSAY OF LIPASE: CPT

## 2022-07-10 PROCEDURE — G0378 HOSPITAL OBSERVATION PER HR: HCPCS

## 2022-07-10 PROCEDURE — 82962 GLUCOSE BLOOD TEST: CPT

## 2022-07-10 PROCEDURE — 80053 COMPREHEN METABOLIC PANEL: CPT

## 2022-07-10 PROCEDURE — 96374 THER/PROPH/DIAG INJ IV PUSH: CPT

## 2022-07-10 PROCEDURE — 99285 EMERGENCY DEPT VISIT HI MDM: CPT

## 2022-07-10 PROCEDURE — 74011000250 HC RX REV CODE- 250: Performed by: INTERNAL MEDICINE

## 2022-07-10 PROCEDURE — 74011636637 HC RX REV CODE- 636/637: Performed by: EMERGENCY MEDICINE

## 2022-07-10 PROCEDURE — 74011250636 HC RX REV CODE- 250/636: Performed by: INTERNAL MEDICINE

## 2022-07-10 PROCEDURE — 96372 THER/PROPH/DIAG INJ SC/IM: CPT

## 2022-07-10 PROCEDURE — 74176 CT ABD & PELVIS W/O CONTRAST: CPT

## 2022-07-10 PROCEDURE — 81001 URINALYSIS AUTO W/SCOPE: CPT

## 2022-07-10 PROCEDURE — 87086 URINE CULTURE/COLONY COUNT: CPT

## 2022-07-10 PROCEDURE — 96376 TX/PRO/DX INJ SAME DRUG ADON: CPT

## 2022-07-10 PROCEDURE — 74011636637 HC RX REV CODE- 636/637: Performed by: INTERNAL MEDICINE

## 2022-07-10 PROCEDURE — 96375 TX/PRO/DX INJ NEW DRUG ADDON: CPT

## 2022-07-10 PROCEDURE — 36415 COLL VENOUS BLD VENIPUNCTURE: CPT

## 2022-07-10 PROCEDURE — 85027 COMPLETE CBC AUTOMATED: CPT

## 2022-07-10 PROCEDURE — 74011250636 HC RX REV CODE- 250/636: Performed by: EMERGENCY MEDICINE

## 2022-07-10 PROCEDURE — 83036 HEMOGLOBIN GLYCOSYLATED A1C: CPT

## 2022-07-10 PROCEDURE — 51798 US URINE CAPACITY MEASURE: CPT

## 2022-07-10 RX ORDER — SODIUM CHLORIDE 9 MG/ML
100 INJECTION, SOLUTION INTRAVENOUS CONTINUOUS
Status: DISCONTINUED | OUTPATIENT
Start: 2022-07-10 | End: 2022-07-11

## 2022-07-10 RX ORDER — HYDRALAZINE HYDROCHLORIDE 20 MG/ML
10 INJECTION INTRAMUSCULAR; INTRAVENOUS
Status: DISCONTINUED | OUTPATIENT
Start: 2022-07-10 | End: 2022-07-12 | Stop reason: HOSPADM

## 2022-07-10 RX ORDER — ONDANSETRON 4 MG/1
4 TABLET, ORALLY DISINTEGRATING ORAL
Status: DISCONTINUED | OUTPATIENT
Start: 2022-07-10 | End: 2022-07-12 | Stop reason: HOSPADM

## 2022-07-10 RX ORDER — PANTOPRAZOLE SODIUM 40 MG/1
40 TABLET, DELAYED RELEASE ORAL DAILY
Status: DISCONTINUED | OUTPATIENT
Start: 2022-07-11 | End: 2022-07-10

## 2022-07-10 RX ORDER — HEPARIN SODIUM 5000 [USP'U]/ML
5000 INJECTION, SOLUTION INTRAVENOUS; SUBCUTANEOUS EVERY 8 HOURS
Status: DISCONTINUED | OUTPATIENT
Start: 2022-07-10 | End: 2022-07-12 | Stop reason: HOSPADM

## 2022-07-10 RX ORDER — MUPIROCIN 20 MG/G
OINTMENT TOPICAL DAILY
Status: DISCONTINUED | OUTPATIENT
Start: 2022-07-11 | End: 2022-07-12 | Stop reason: HOSPADM

## 2022-07-10 RX ORDER — ACETAMINOPHEN 325 MG/1
650 TABLET ORAL
Status: DISCONTINUED | OUTPATIENT
Start: 2022-07-10 | End: 2022-07-12 | Stop reason: HOSPADM

## 2022-07-10 RX ORDER — ONDANSETRON 2 MG/ML
4 INJECTION INTRAMUSCULAR; INTRAVENOUS
Status: COMPLETED | OUTPATIENT
Start: 2022-07-10 | End: 2022-07-10

## 2022-07-10 RX ORDER — ACETAMINOPHEN 650 MG/1
650 SUPPOSITORY RECTAL
Status: DISCONTINUED | OUTPATIENT
Start: 2022-07-10 | End: 2022-07-12 | Stop reason: HOSPADM

## 2022-07-10 RX ORDER — INSULIN LISPRO 100 [IU]/ML
INJECTION, SOLUTION INTRAVENOUS; SUBCUTANEOUS
Status: DISCONTINUED | OUTPATIENT
Start: 2022-07-10 | End: 2022-07-11

## 2022-07-10 RX ORDER — AMLODIPINE BESYLATE 5 MG/1
5 TABLET ORAL DAILY
Status: DISCONTINUED | OUTPATIENT
Start: 2022-07-11 | End: 2022-07-12 | Stop reason: HOSPADM

## 2022-07-10 RX ORDER — MORPHINE SULFATE 2 MG/ML
1 INJECTION, SOLUTION INTRAMUSCULAR; INTRAVENOUS ONCE
Status: COMPLETED | OUTPATIENT
Start: 2022-07-10 | End: 2022-07-10

## 2022-07-10 RX ORDER — ERGOCALCIFEROL 1.25 MG/1
50000 CAPSULE ORAL
Status: DISCONTINUED | OUTPATIENT
Start: 2022-07-10 | End: 2022-07-10

## 2022-07-10 RX ORDER — FINASTERIDE 5 MG/1
5 TABLET, FILM COATED ORAL DAILY
Status: DISCONTINUED | OUTPATIENT
Start: 2022-07-11 | End: 2022-07-12 | Stop reason: HOSPADM

## 2022-07-10 RX ORDER — POLYETHYLENE GLYCOL 3350 17 G/17G
17 POWDER, FOR SOLUTION ORAL DAILY PRN
Status: DISCONTINUED | OUTPATIENT
Start: 2022-07-10 | End: 2022-07-11

## 2022-07-10 RX ORDER — MORPHINE SULFATE 2 MG/ML
2 INJECTION, SOLUTION INTRAMUSCULAR; INTRAVENOUS
Status: COMPLETED | OUTPATIENT
Start: 2022-07-10 | End: 2022-07-10

## 2022-07-10 RX ORDER — INSULIN LISPRO 100 [IU]/ML
7 INJECTION, SOLUTION INTRAVENOUS; SUBCUTANEOUS ONCE
Status: COMPLETED | OUTPATIENT
Start: 2022-07-10 | End: 2022-07-10

## 2022-07-10 RX ORDER — SODIUM CHLORIDE 0.9 % (FLUSH) 0.9 %
5-40 SYRINGE (ML) INJECTION EVERY 8 HOURS
Status: DISCONTINUED | OUTPATIENT
Start: 2022-07-10 | End: 2022-07-12 | Stop reason: HOSPADM

## 2022-07-10 RX ORDER — SODIUM CHLORIDE 0.9 % (FLUSH) 0.9 %
5-40 SYRINGE (ML) INJECTION AS NEEDED
Status: DISCONTINUED | OUTPATIENT
Start: 2022-07-10 | End: 2022-07-12 | Stop reason: HOSPADM

## 2022-07-10 RX ORDER — CARVEDILOL 12.5 MG/1
12.5 TABLET ORAL 2 TIMES DAILY WITH MEALS
Status: DISCONTINUED | OUTPATIENT
Start: 2022-07-11 | End: 2022-07-12 | Stop reason: HOSPADM

## 2022-07-10 RX ORDER — DULOXETIN HYDROCHLORIDE 30 MG/1
60 CAPSULE, DELAYED RELEASE ORAL DAILY
Status: DISCONTINUED | OUTPATIENT
Start: 2022-07-11 | End: 2022-07-12 | Stop reason: HOSPADM

## 2022-07-10 RX ORDER — TAMSULOSIN HYDROCHLORIDE 0.4 MG/1
0.4 CAPSULE ORAL DAILY
Status: DISCONTINUED | OUTPATIENT
Start: 2022-07-11 | End: 2022-07-12 | Stop reason: HOSPADM

## 2022-07-10 RX ORDER — INSULIN GLARGINE 100 [IU]/ML
16 INJECTION, SOLUTION SUBCUTANEOUS DAILY
Status: DISCONTINUED | OUTPATIENT
Start: 2022-07-11 | End: 2022-07-12 | Stop reason: HOSPADM

## 2022-07-10 RX ORDER — ONDANSETRON 2 MG/ML
4 INJECTION INTRAMUSCULAR; INTRAVENOUS
Status: DISCONTINUED | OUTPATIENT
Start: 2022-07-10 | End: 2022-07-12 | Stop reason: HOSPADM

## 2022-07-10 RX ORDER — MAGNESIUM SULFATE 100 %
4 CRYSTALS MISCELLANEOUS AS NEEDED
Status: DISCONTINUED | OUTPATIENT
Start: 2022-07-10 | End: 2022-07-12 | Stop reason: HOSPADM

## 2022-07-10 RX ORDER — ROSUVASTATIN CALCIUM 20 MG/1
20 TABLET, COATED ORAL
Status: DISCONTINUED | OUTPATIENT
Start: 2022-07-10 | End: 2022-07-12 | Stop reason: HOSPADM

## 2022-07-10 RX ADMIN — MORPHINE SULFATE 2 MG: 2 INJECTION, SOLUTION INTRAMUSCULAR; INTRAVENOUS at 18:55

## 2022-07-10 RX ADMIN — SODIUM CHLORIDE, PRESERVATIVE FREE 10 ML: 5 INJECTION INTRAVENOUS at 21:47

## 2022-07-10 RX ADMIN — SODIUM CHLORIDE 1000 ML: 9 INJECTION, SOLUTION INTRAVENOUS at 16:27

## 2022-07-10 RX ADMIN — HEPARIN SODIUM 5000 UNITS: 5000 INJECTION INTRAVENOUS; SUBCUTANEOUS at 21:47

## 2022-07-10 RX ADMIN — MORPHINE SULFATE 1 MG: 2 INJECTION, SOLUTION INTRAMUSCULAR; INTRAVENOUS at 23:23

## 2022-07-10 RX ADMIN — ONDANSETRON 4 MG: 2 INJECTION INTRAMUSCULAR; INTRAVENOUS at 15:06

## 2022-07-10 RX ADMIN — Medication 7 UNITS: at 23:23

## 2022-07-10 RX ADMIN — ONDANSETRON 4 MG: 2 INJECTION INTRAMUSCULAR; INTRAVENOUS at 21:46

## 2022-07-10 RX ADMIN — SODIUM CHLORIDE 84 ML/HR: 0.9 INJECTION, SOLUTION INTRAVENOUS at 19:20

## 2022-07-10 RX ADMIN — SODIUM CHLORIDE, PRESERVATIVE FREE 10 ML: 5 INJECTION INTRAVENOUS at 22:00

## 2022-07-10 RX ADMIN — HYDRALAZINE HYDROCHLORIDE 10 MG: 20 INJECTION INTRAMUSCULAR; INTRAVENOUS at 19:15

## 2022-07-10 RX ADMIN — Medication 10 UNITS: at 18:08

## 2022-07-10 RX ADMIN — ONDANSETRON 4 MG: 2 INJECTION INTRAMUSCULAR; INTRAVENOUS at 17:25

## 2022-07-10 NOTE — ED NOTES
Report given to Can Bass RN at the bedside. Nurse was informed of reason for arrival, vitals, labs, medications, orders, procedures, results, anything left pending and current plan of action. Questions were asked and received prior to departure from the patient.

## 2022-07-10 NOTE — Clinical Note
Status[de-identified] INPATIENT [101]   Type of Bed: Remote Telemetry [29]   Cardiac Monitoring Required?: Yes   Inpatient Hospitalization Certified Necessary for the Following Reasons: 3.  Patient receiving treatment that can only be provided in an inpatient setting (further clarification in H&P documentation)   Admitting Diagnosis: Hyperglycemia due to diabetes mellitus [facility]) [[id number]]   Admitting Diagnosis: FELECIA (acute kidney injury) [facility]) [[id number]]   Admitting Physician: Rodrigo Swartz   Attending Physician: Viral Espitia [[id number]]   Estimated Length of Stay: 3-4 Midnights   Discharge Plan[de-identified] [facility]

## 2022-07-10 NOTE — ED NOTES
Bladder scan showed showed 680 mL.  Immediately after, pt able to stand and void 650 mL in to urinal.

## 2022-07-10 NOTE — ED NOTES
Report received from [facility]. Reviewed reason for patient arrival, vitals, labs, medications, orders, procedures, results, pending orders/results and current plan for disposition. Questions were asked and answered prior to departure.

## 2022-07-10 NOTE — ED NOTES
Bedside shift change report given to Madyson Trinidad (oncoming nurse) by Jus Link (offgoing nurse). Report included the following information SBAR, Kardex, ED Summary and MAR.

## 2022-07-10 NOTE — H&P
GENERAL GENERIC H&P/CONSULT  Presenting complaint: Nausea/vomiting    Subjective: 30-year-old male with past medical history multiple comorbidities presents to [facility] with complaints of nausea and vomiting. Patient states he was in his usual state of health until 2 days back when he start experiencing sudden onset persistent nausea as well as multiple episodes of nonbilious nonprojectile nonbloody vomiting resulting in an inability to take p.o. as well as generalized weakness following which patient presented to the ED. Patient denies any fevers chills lightheadedness dizziness dyspnea orthopnea paroxysmal nocturnal dyspnea chest pain palpitations headache focal weakness loss of sensation auditory or visual symptoms or stool or urinary complaints. Patient endorses no recent sick contacts or travel activity    Past Medical History:   Diagnosis Date    Bipolar 1 disorder, depressed ([address]     Bipolar disorder ([address]     Chronic kidney disease, stage 3a ([address]     Depression     Diabetes ([address]     DKA, type 1 ([address] 1/27/2013    diagnosed age 23    DKA, type 1 ([address]     H/O noncompliance with medical treatment, presenting hazards to health     MRSA (methicillin resistant staph aureus) culture positive     MRSA (methicillin resistant Staphylococcus aureus)     Face    Noncompliance with medication regimen     Second hand smoke exposure     Seizure ([address]     Seizures ([address] 2006 or 2007    one episode during [facility]      Past Surgical History:   Procedure Laterality Date    HX HEENT      top left wisdom tooth    HX ORTHOPAEDIC Left     wrist; MCV    UPPER GI ENDOSCOPY,BIOPSY  11/20/2018           Prior to Admission medications    Medication Sig Start Date End Date Taking?  Authorizing Provider   Dexcom G6  misc Use with the dexcom device to check blood sugars 4 times a day 7/1/22   Jared Booth MD   DULoxetine (CYMBALTA) 60 mg capsule Take 1 capsule by mouth once daily 6/22/22   Roscoe Cain MD   pantoprazole (PROTONIX) 40 mg tablet Take 1 tablet by mouth once daily 6/22/22   Roscoe Cain MD   Dexcom G6 Sensor brandi Use as directed every 10 days 6/21/22   Chalino Sanders MD   Dexcom G6 Transmitter brandi Use as directed 6/21/22   Chalino Sanders MD   FreeStyle Connor 14 Day Sensor kit Use as directed every 14 days 6/21/22   Chalino Sanders MD   Insulin Needles, Disposable, 31 gauge x 5/16\" ndle Dx code E11.65, use 6x daily 6/21/22   Chalino Sanders MD   insulin glargine (LANTUS) 100 unit/mL injection 16 Units by SubCUTAneous route daily. 6/21/22   Chalino Sanders MD   insulin aspart U-100 (NovoLOG Flexpen U-100 Insulin) 100 unit/mL (3 mL) inpn Take 1 unit for every 15 grams of carbohydrates, 1 unit for every 50 points >150. Max daily dose 25U. 6/21/22   Chalino Sanders MD   rosuvastatin (CRESTOR) 20 mg tablet Take 1 Tablet by mouth nightly. 6/2/22   Roscoe Cain MD   amLODIPine (NORVASC) 5 mg tablet Take 1 Tablet by mouth daily. 5/19/22   Roscoe Cain MD   carvediloL (COREG) 12.5 mg tablet Take 1 Tablet by mouth two (2) times daily (with meals). 5/19/22   Roscoe Cain MD   mupirocin (BACTROBAN) 2 % ointment Apply  to affected area daily. Apply until symptoms resolve 5/17/22   Angélica Mckeon MD   tamsulosin [facility]) 0.4 mg capsule Take 0.4 mg by mouth daily. Provider, Historical   naloxone (Narcan) 4 mg/actuation nasal spray Use 1 spray intranasally, then discard. Repeat with new spray every 2 min as needed for opioid overdose symptoms, alternating nostrils. 2/23/22   Roscoe Cain MD   pen needle, diabetic 30 gauge x 3/16\" ndle 5 Units by Does Not Apply route Before breakfast, lunch, and dinner. 2/23/22   Roscoe Cain MD   finasteride (PROSCAR) 5 mg tablet Take 1 Tablet by mouth daily.  2/15/22   Ziyad Michel MD   ondansetron (ZOFRAN ODT) 4 mg disintegrating tablet Take 1 Tablet by mouth every eight (8) hours as needed for Nausea or Vomiting. 21   Vicenta Pickens MD   ergocalciferol (ERGOCALCIFEROL) 1,250 mcg (50,000 unit) capsule Take 1 capsule by mouth once a week  Patient not taking: Reported on 5/10/2022 10/30/21   Cheri Kerr MD   pregabalin (Lyrica) 75 mg capsule Take 75 mg by mouth two (2) times a day. Provider, Historical     No Known Allergies   Social History     Tobacco Use    Smoking status: Former Smoker     Packs/day: 0.10     Years: 16.00     Pack years: 1.60     Types: Cigarettes     Start date: 10/4/1999     Quit date: 2020     Years since quittin.3    Smokeless tobacco: Never Used   Substance Use Topics    Alcohol use: No      Family History   Problem Relation Age of Onset    Diabetes Mother     Diabetes Other         neice, type 1       Review of Systems   Constitutional: Positive for activity change, appetite change and fatigue. Negative for chills, diaphoresis, fever and unexpected weight change. HENT: Negative for congestion, dental problem, drooling, ear discharge, ear pain, facial swelling, hearing loss, mouth sores, nosebleeds, postnasal drip, rhinorrhea, sinus pressure, sinus pain, sneezing, sore throat, tinnitus, trouble swallowing and voice change. Eyes: Negative for photophobia, pain, discharge, redness, itching and visual disturbance. Respiratory: Negative for apnea, cough, choking, chest tightness, shortness of breath, wheezing and stridor. Cardiovascular: Negative for chest pain, palpitations and leg swelling. Gastrointestinal: Positive for nausea and vomiting. Negative for abdominal distention, abdominal pain, anal bleeding, blood in stool, constipation, diarrhea and rectal pain. Endocrine: Negative for cold intolerance, heat intolerance, polydipsia, polyphagia and polyuria.    Genitourinary: Negative for decreased urine volume, difficulty urinating, dysuria, enuresis, flank pain, frequency, genital sores, hematuria, penile discharge, penile pain, penile swelling, scrotal swelling, testicular pain and urgency. Musculoskeletal: Negative for arthralgias, back pain, gait problem, joint swelling, myalgias, neck pain and neck stiffness. Skin: Negative for color change, pallor, rash and wound. Allergic/Immunologic: Negative for environmental allergies, food allergies and immunocompromised state. Neurological: Positive for weakness. Negative for dizziness, tremors, seizures, syncope, facial asymmetry, speech difficulty, light-headedness, numbness and headaches. Hematological: Negative for adenopathy. Does not bruise/bleed easily. Psychiatric/Behavioral: Negative for agitation, behavioral problems, confusion, decreased concentration, dysphoric mood, hallucinations, self-injury, sleep disturbance and suicidal ideas. The patient is not nervous/anxious and is not hyperactive. Objective:    No intake/output data recorded. 07/09 0701 - 07/10 1900  In: -   Out: 650 [Urine:650]  Patient Vitals for the past 8 hrs:   BP Temp Pulse Resp SpO2 Height Weight   07/10/22 1931 (!) 143/89 -- 90 15 99 % -- --   07/10/22 1915 (!) 191/100 -- 91 -- -- -- --   07/10/22 1830 (!) 188/92 -- -- -- 100 % -- --   07/10/22 1800 (!) 207/101 -- -- -- 99 % -- --   07/10/22 1745 (!) 211/115 -- -- -- 99 % -- --   07/10/22 1720 (!) 160/122 -- -- -- 99 % -- --   07/10/22 1600 (!) 188/98 -- 80 -- 96 % -- --   07/10/22 1320 (!) 172/99 97.9 °F (36.6 °C) 88 16 97 % 5' 9\" (1.753 m) 63.5 kg (140 lb)     Physical Exam  Vitals reviewed. Constitutional:       General: He is not in acute distress. Appearance: He is normal weight. He is ill-appearing. He is not toxic-appearing or diaphoretic. HENT:      Head: Normocephalic and atraumatic. Nose: Nose normal. No congestion or rhinorrhea. Mouth/Throat:      Mouth: Mucous membranes are dry. Pharynx: Oropharynx is clear. No oropharyngeal exudate or posterior oropharyngeal erythema. Eyes:      General: No scleral icterus. Right eye: No discharge. Left eye: No discharge. Extraocular Movements: Extraocular movements intact. Conjunctiva/sclera: Conjunctivae normal.      Pupils: Pupils are equal, round, and reactive to light. Neck:      Vascular: No carotid bruit. Cardiovascular:      Rate and Rhythm: Normal rate and regular rhythm. Pulses: Normal pulses. Heart sounds: Normal heart sounds. No murmur heard. No friction rub. No gallop. Pulmonary:      Effort: Pulmonary effort is normal. No respiratory distress. Breath sounds: Normal breath sounds. No stridor. No wheezing, rhonchi or rales. Chest:      Chest wall: No tenderness. Abdominal:      General: Abdomen is flat. Bowel sounds are normal. There is no distension. Palpations: Abdomen is soft. There is no mass. Tenderness: There is no abdominal tenderness. There is no right CVA tenderness, left CVA tenderness, guarding or rebound. Hernia: No hernia is present. Musculoskeletal:         General: No swelling, tenderness, deformity or signs of injury. Normal range of motion. Cervical back: Normal range of motion and neck supple. No rigidity or tenderness. Right lower leg: No edema. Left lower leg: No edema. Lymphadenopathy:      Cervical: No cervical adenopathy. Skin:     General: Skin is warm. Capillary Refill: Capillary refill takes less than 2 seconds. Coloration: Skin is not jaundiced or pale. Findings: No bruising, erythema, lesion or rash. Neurological:      General: No focal deficit present. Mental Status: He is alert and oriented to person, place, and time. Mental status is at baseline. Cranial Nerves: No cranial nerve deficit. Sensory: No sensory deficit. Motor: No weakness.       Coordination: Coordination normal.      Gait: Gait normal.      Deep Tendon Reflexes: Reflexes normal.   Psychiatric:         Mood and Affect: Mood normal.         Behavior: Behavior normal. Thought Content: Thought content normal.         Judgment: Judgment normal.          Labs:    Recent Results (from the past 24 hour(s))   SAMPLES BEING HELD    Collection Time: 07/10/22  1:25 PM   Result Value Ref Range    SAMPLES BEING HELD LAV, PST     COMMENT        Add-on orders for these samples will be processed based on acceptable specimen integrity and analyte stability, which may vary by analyte.    URINALYSIS W/ REFLEX CULTURE    Collection Time: 07/10/22  1:48 PM    Specimen: Urine   Result Value Ref Range    Color YELLOW/STRAW      Appearance CLOUDY (A) CLEAR      Specific gravity 1.019      pH (UA) 6.0 5.0 - 8.0      Protein >300 (A) NEG mg/dL    Glucose >1,000 (A) NEG mg/dL    Ketone 15 (A) NEG mg/dL    Bilirubin Negative NEG      Blood MODERATE (A) NEG      Urobilinogen 0.2 0.2 - 1.0 EU/dL    Nitrites Negative NEG      Leukocyte Esterase Negative NEG      WBC 20-50 0 - 4 /hpf    RBC 0-5 0 - 5 /hpf    Epithelial cells FEW FEW /lpf    Bacteria Negative NEG /hpf    UA:UC IF INDICATED URINE CULTURE ORDERED (A) CNI      Hyaline cast 2-5 0 - 5 /lpf    Granular cast 2-5 (A) NEG /lpf    Mixed Cell Cast 2-5 (A) NEG /LPF   GLUCOSE, POC    Collection Time: 07/10/22  2:36 PM   Result Value Ref Range    Glucose (POC) 450 (H) 65 - 117 mg/dL    Performed by [facility] EDT    CBC W/O DIFF    Collection Time: 07/10/22  2:45 PM   Result Value Ref Range    WBC 11.4 (H) 4.1 - 11.1 K/uL    RBC 3.76 (L) 4.10 - 5.70 M/uL    HGB 10.8 (L) 12.1 - 17.0 g/dL    HCT 31.7 (L) 36.6 - 50.3 %    MCV 84.3 80.0 - 99.0 FL    MCH 28.7 26.0 - 34.0 PG    MCHC 34.1 30.0 - 36.5 g/dL    RDW 15.2 (H) 11.5 - 14.5 %    PLATELET 338 (H) 591 - 400 K/uL    MPV 9.5 8.9 - 12.9 FL    NRBC 0.0 0  WBC    ABSOLUTE NRBC 0.00 0.00 - 7.64 K/uL   METABOLIC PANEL, COMPREHENSIVE    Collection Time: 07/10/22  2:45 PM   Result Value Ref Range    Sodium 132 (L) 136 - 145 mmol/L    Potassium 3.8 3.5 - 5.1 mmol/L    Chloride 102 97 - 108 mmol/L CO2 17 (L) 21 - 32 mmol/L    Anion gap 13 5 - 15 mmol/L    Glucose 465 (H) 65 - 100 mg/dL    BUN 43 (H) 6 - 20 MG/DL    Creatinine 3.44 (H) 0.70 - 1.30 MG/DL    BUN/Creatinine ratio 13 12 - 20      GFR est AA 24 (L) >60 ml/min/1.73m2    GFR est non-AA 20 (L) >60 ml/min/1.73m2    Calcium 8.9 8.5 - 10.1 MG/DL    Bilirubin, total 0.2 0.2 - 1.0 MG/DL    ALT (SGPT) 28 12 - 78 U/L    AST (SGOT) 18 15 - 37 U/L    Alk. phosphatase 149 (H) 45 - 117 U/L    Protein, total 5.7 (L) 6.4 - 8.2 g/dL    Albumin 1.5 (L) 3.5 - 5.0 g/dL    Globulin 4.2 (H) 2.0 - 4.0 g/dL    A-G Ratio 0.4 (L) 1.1 - 2.2     LIPASE    Collection Time: 07/10/22  2:45 PM   Result Value Ref Range    Lipase 56 (L) 73 - 393 U/L   GLUCOSE, POC    Collection Time: 07/10/22  6:54 PM   Result Value Ref Range    Glucose (POC) 431 (H) 65 - 117 mg/dL    Performed by Nichole Dueñas        ECG: nonspecific ST and T waves changes   CT abdomen/pelvis:1. Distal esophageal mucosal thickening, suspicious for esophagitis. 2. Severely distended bladder without bladder wall emphysema or focal  thickening. 3. Trace bilateral pleural effusions.     Assessment:  Active Problems:    FELECIA (acute kidney injury) ([facility] (1/2/2020)      Hyperglycemia due to diabetes mellitus ([facility] (7/10/2022)        Plan:    Intractable nausea and vomiting-patient presents with above-mentioned symptomatology found to have intractable nausea and vomiting, likely secondary to esophagitis, patient salvador hemodynamically stable at this time, does not appear to be infectious in etiology  Maintenance IV fluids  Aggressive hydration  Zofran as needed for symptomatic relief  Protonix IV once daily  Continue to monitor clinical status    Acute on chronic kidney disease stage III-of note patient found to have serum creatinine above baseline, likely prerenal etiology  Obtain urine electrolytes calculate fractional excretion of sodium  Maintenance IV fluids  Continue to trend serum creatinine    Hyperglycemia type 2 diabetes-reinitiate home insulin regimen with additional sliding scale further coverage    Hypertensive urgency-reinitiate home antihypertensive medications, hydralazine as needed for systolic blood pressure of over 160    FEN- cardiac/diabetic diet, maintenance IV fluids, replete potassium and magnesium  Full code, will clarify about surrogate decision-maker, admitted  for observation    Signed:   Leoncio Vidal MD 7/10/2022

## 2022-07-11 ENCOUNTER — ANESTHESIA (OUTPATIENT)
Dept: ENDOSCOPY | Age: 42
End: 2022-07-11
Payer: MEDICAID

## 2022-07-11 ENCOUNTER — ANESTHESIA EVENT (OUTPATIENT)
Dept: ENDOSCOPY | Age: 42
End: 2022-07-11
Payer: MEDICAID

## 2022-07-11 LAB
ANION GAP SERPL CALC-SCNC: 12 MMOL/L (ref 5–15)
BACTERIA SPEC CULT: NORMAL
BASOPHILS # BLD: 0 K/UL (ref 0–0.1)
BASOPHILS NFR BLD: 0 % (ref 0–1)
BUN SERPL-MCNC: 46 MG/DL (ref 6–20)
BUN/CREAT SERPL: 12 (ref 12–20)
CALCIUM SERPL-MCNC: 8.9 MG/DL (ref 8.5–10.1)
CHLORIDE SERPL-SCNC: 103 MMOL/L (ref 97–108)
CHLORIDE UR-SCNC: 24 MMOL/L
CO2 SERPL-SCNC: 19 MMOL/L (ref 21–32)
CREAT SERPL-MCNC: 3.71 MG/DL (ref 0.7–1.3)
CREAT UR-MCNC: 39.4 MG/DL
DIFFERENTIAL METHOD BLD: ABNORMAL
EOSINOPHIL # BLD: 0 K/UL (ref 0–0.4)
EOSINOPHIL NFR BLD: 0 % (ref 0–7)
ERYTHROCYTE [DISTWIDTH] IN BLOOD BY AUTOMATED COUNT: 15.7 % (ref 11.5–14.5)
GLUCOSE BLD STRIP.AUTO-MCNC: 125 MG/DL (ref 65–117)
GLUCOSE BLD STRIP.AUTO-MCNC: 141 MG/DL (ref 65–117)
GLUCOSE BLD STRIP.AUTO-MCNC: 174 MG/DL (ref 65–117)
GLUCOSE BLD STRIP.AUTO-MCNC: 271 MG/DL (ref 65–117)
GLUCOSE BLD STRIP.AUTO-MCNC: 59 MG/DL (ref 65–117)
GLUCOSE BLD STRIP.AUTO-MCNC: 90 MG/DL (ref 65–117)
GLUCOSE SERPL-MCNC: 303 MG/DL (ref 65–100)
HCT VFR BLD AUTO: 31.8 % (ref 36.6–50.3)
HGB BLD-MCNC: 10.7 G/DL (ref 12.1–17)
IMM GRANULOCYTES # BLD AUTO: 0.1 K/UL (ref 0–0.04)
IMM GRANULOCYTES NFR BLD AUTO: 1 % (ref 0–0.5)
LYMPHOCYTES # BLD: 2.1 K/UL (ref 0.8–3.5)
LYMPHOCYTES NFR BLD: 16 % (ref 12–49)
MAGNESIUM SERPL-MCNC: 2.6 MG/DL (ref 1.6–2.4)
MCH RBC QN AUTO: 28.8 PG (ref 26–34)
MCHC RBC AUTO-ENTMCNC: 33.6 G/DL (ref 30–36.5)
MCV RBC AUTO: 85.7 FL (ref 80–99)
MONOCYTES # BLD: 1 K/UL (ref 0–1)
MONOCYTES NFR BLD: 7 % (ref 5–13)
NEUTS SEG # BLD: 10.2 K/UL (ref 1.8–8)
NEUTS SEG NFR BLD: 76 % (ref 32–75)
NRBC # BLD: 0 K/UL (ref 0–0.01)
NRBC BLD-RTO: 0 PER 100 WBC
PLATELET # BLD AUTO: 597 K/UL (ref 150–400)
PMV BLD AUTO: 10 FL (ref 8.9–12.9)
POTASSIUM SERPL-SCNC: 3.9 MMOL/L (ref 3.5–5.1)
RBC # BLD AUTO: 3.71 M/UL (ref 4.1–5.7)
SERVICE CMNT-IMP: ABNORMAL
SERVICE CMNT-IMP: NORMAL
SERVICE CMNT-IMP: NORMAL
SODIUM SERPL-SCNC: 134 MMOL/L (ref 136–145)
SODIUM UR-SCNC: 33 MMOL/L
WBC # BLD AUTO: 13.4 K/UL (ref 4.1–11.1)

## 2022-07-11 PROCEDURE — 96372 THER/PROPH/DIAG INJ SC/IM: CPT

## 2022-07-11 PROCEDURE — 76040000019: Performed by: INTERNAL MEDICINE

## 2022-07-11 PROCEDURE — 77030019988 HC FCPS ENDOSC DISP BSC -B: Performed by: INTERNAL MEDICINE

## 2022-07-11 PROCEDURE — 74011250637 HC RX REV CODE- 250/637: Performed by: NURSE PRACTITIONER

## 2022-07-11 PROCEDURE — 74011250636 HC RX REV CODE- 250/636: Performed by: NURSE ANESTHETIST, CERTIFIED REGISTERED

## 2022-07-11 PROCEDURE — 76060000031 HC ANESTHESIA FIRST 0.5 HR: Performed by: INTERNAL MEDICINE

## 2022-07-11 PROCEDURE — 88305 TISSUE EXAM BY PATHOLOGIST: CPT

## 2022-07-11 PROCEDURE — 85025 COMPLETE CBC W/AUTO DIFF WBC: CPT

## 2022-07-11 PROCEDURE — 74011000250 HC RX REV CODE- 250: Performed by: INTERNAL MEDICINE

## 2022-07-11 PROCEDURE — 88341 IMHCHEM/IMCYTCHM EA ADD ANTB: CPT

## 2022-07-11 PROCEDURE — 36415 COLL VENOUS BLD VENIPUNCTURE: CPT

## 2022-07-11 PROCEDURE — 82570 ASSAY OF URINE CREATININE: CPT

## 2022-07-11 PROCEDURE — 74011250636 HC RX REV CODE- 250/636: Performed by: PHYSICIAN ASSISTANT

## 2022-07-11 PROCEDURE — 74011636637 HC RX REV CODE- 636/637: Performed by: INTERNAL MEDICINE

## 2022-07-11 PROCEDURE — 74011250636 HC RX REV CODE- 250/636: Performed by: INTERNAL MEDICINE

## 2022-07-11 PROCEDURE — 74011000250 HC RX REV CODE- 250: Performed by: PHYSICIAN ASSISTANT

## 2022-07-11 PROCEDURE — 74011000250 HC RX REV CODE- 250: Performed by: NURSE ANESTHETIST, CERTIFIED REGISTERED

## 2022-07-11 PROCEDURE — G0378 HOSPITAL OBSERVATION PER HR: HCPCS

## 2022-07-11 PROCEDURE — C9113 INJ PANTOPRAZOLE SODIUM, VIA: HCPCS | Performed by: PHYSICIAN ASSISTANT

## 2022-07-11 PROCEDURE — 82962 GLUCOSE BLOOD TEST: CPT

## 2022-07-11 PROCEDURE — 96376 TX/PRO/DX INJ SAME DRUG ADON: CPT

## 2022-07-11 PROCEDURE — 96375 TX/PRO/DX INJ NEW DRUG ADDON: CPT

## 2022-07-11 PROCEDURE — 82436 ASSAY OF URINE CHLORIDE: CPT

## 2022-07-11 PROCEDURE — 88312 SPECIAL STAINS GROUP 1: CPT

## 2022-07-11 PROCEDURE — 74011250637 HC RX REV CODE- 250/637: Performed by: INTERNAL MEDICINE

## 2022-07-11 PROCEDURE — 2709999900 HC NON-CHARGEABLE SUPPLY: Performed by: INTERNAL MEDICINE

## 2022-07-11 PROCEDURE — 88342 IMHCHEM/IMCYTCHM 1ST ANTB: CPT

## 2022-07-11 PROCEDURE — 84300 ASSAY OF URINE SODIUM: CPT

## 2022-07-11 PROCEDURE — 74011250636 HC RX REV CODE- 250/636: Performed by: NURSE PRACTITIONER

## 2022-07-11 PROCEDURE — 83735 ASSAY OF MAGNESIUM: CPT

## 2022-07-11 PROCEDURE — 80048 BASIC METABOLIC PNL TOTAL CA: CPT

## 2022-07-11 RX ORDER — PROPOFOL 10 MG/ML
INJECTION, EMULSION INTRAVENOUS AS NEEDED
Status: DISCONTINUED | OUTPATIENT
Start: 2022-07-11 | End: 2022-07-11 | Stop reason: HOSPADM

## 2022-07-11 RX ORDER — INSULIN LISPRO 100 [IU]/ML
8 INJECTION, SOLUTION INTRAVENOUS; SUBCUTANEOUS
Status: DISCONTINUED | OUTPATIENT
Start: 2022-07-11 | End: 2022-07-12 | Stop reason: HOSPADM

## 2022-07-11 RX ORDER — EPINEPHRINE 0.1 MG/ML
1 INJECTION INTRACARDIAC; INTRAVENOUS
Status: DISCONTINUED | OUTPATIENT
Start: 2022-07-11 | End: 2022-07-11 | Stop reason: HOSPADM

## 2022-07-11 RX ORDER — LIDOCAINE HYDROCHLORIDE 20 MG/ML
INJECTION, SOLUTION EPIDURAL; INFILTRATION; INTRACAUDAL; PERINEURAL AS NEEDED
Status: DISCONTINUED | OUTPATIENT
Start: 2022-07-11 | End: 2022-07-11 | Stop reason: HOSPADM

## 2022-07-11 RX ORDER — POLYETHYLENE GLYCOL 3350 17 G/17G
17 POWDER, FOR SOLUTION ORAL DAILY
Status: DISCONTINUED | OUTPATIENT
Start: 2022-07-12 | End: 2022-07-12 | Stop reason: HOSPADM

## 2022-07-11 RX ORDER — DIPHENHYDRAMINE HYDROCHLORIDE 50 MG/ML
25 INJECTION, SOLUTION INTRAMUSCULAR; INTRAVENOUS
Status: DISCONTINUED | OUTPATIENT
Start: 2022-07-11 | End: 2022-07-12 | Stop reason: HOSPADM

## 2022-07-11 RX ORDER — HYDROCODONE BITARTRATE AND ACETAMINOPHEN 5; 325 MG/1; MG/1
1 TABLET ORAL
Status: DISCONTINUED | OUTPATIENT
Start: 2022-07-11 | End: 2022-07-12 | Stop reason: HOSPADM

## 2022-07-11 RX ORDER — DEXTROMETHORPHAN/PSEUDOEPHED 2.5-7.5/.8
1.2 DROPS ORAL
Status: DISCONTINUED | OUTPATIENT
Start: 2022-07-11 | End: 2022-07-11 | Stop reason: HOSPADM

## 2022-07-11 RX ORDER — MIDAZOLAM HYDROCHLORIDE 1 MG/ML
.25-5 INJECTION, SOLUTION INTRAMUSCULAR; INTRAVENOUS
Status: DISCONTINUED | OUTPATIENT
Start: 2022-07-11 | End: 2022-07-11 | Stop reason: HOSPADM

## 2022-07-11 RX ORDER — DIPHENHYDRAMINE HYDROCHLORIDE 50 MG/ML
50 INJECTION, SOLUTION INTRAMUSCULAR; INTRAVENOUS ONCE
Status: DISCONTINUED | OUTPATIENT
Start: 2022-07-11 | End: 2022-07-11 | Stop reason: HOSPADM

## 2022-07-11 RX ORDER — DEXMEDETOMIDINE HYDROCHLORIDE 100 UG/ML
INJECTION, SOLUTION INTRAVENOUS AS NEEDED
Status: DISCONTINUED | OUTPATIENT
Start: 2022-07-11 | End: 2022-07-11 | Stop reason: HOSPADM

## 2022-07-11 RX ORDER — INSULIN LISPRO 100 [IU]/ML
INJECTION, SOLUTION INTRAVENOUS; SUBCUTANEOUS
Status: DISCONTINUED | OUTPATIENT
Start: 2022-07-11 | End: 2022-07-12 | Stop reason: HOSPADM

## 2022-07-11 RX ORDER — MAG HYDROX/ALUMINUM HYD/SIMETH 200-200-20
30 SUSPENSION, ORAL (FINAL DOSE FORM) ORAL
Status: DISCONTINUED | OUTPATIENT
Start: 2022-07-11 | End: 2022-07-11

## 2022-07-11 RX ORDER — ATROPINE SULFATE 0.1 MG/ML
0.5 INJECTION INTRAVENOUS
Status: DISCONTINUED | OUTPATIENT
Start: 2022-07-11 | End: 2022-07-11 | Stop reason: HOSPADM

## 2022-07-11 RX ORDER — SUCRALFATE 1 G/1
1 TABLET ORAL
Status: DISCONTINUED | OUTPATIENT
Start: 2022-07-11 | End: 2022-07-12 | Stop reason: HOSPADM

## 2022-07-11 RX ORDER — HYDROXYZINE HYDROCHLORIDE 10 MG/1
25 TABLET, FILM COATED ORAL
Status: DISCONTINUED | OUTPATIENT
Start: 2022-07-11 | End: 2022-07-12 | Stop reason: HOSPADM

## 2022-07-11 RX ORDER — SODIUM CHLORIDE 9 MG/ML
125 INJECTION, SOLUTION INTRAVENOUS CONTINUOUS
Status: DISCONTINUED | OUTPATIENT
Start: 2022-07-11 | End: 2022-07-11

## 2022-07-11 RX ORDER — LANOLIN ALCOHOL/MO/W.PET/CERES
3 CREAM (GRAM) TOPICAL
Status: DISCONTINUED | OUTPATIENT
Start: 2022-07-11 | End: 2022-07-12 | Stop reason: HOSPADM

## 2022-07-11 RX ORDER — PHENYLEPHRINE HCL IN 0.9% NACL 0.4MG/10ML
SYRINGE (ML) INTRAVENOUS AS NEEDED
Status: DISCONTINUED | OUTPATIENT
Start: 2022-07-11 | End: 2022-07-11 | Stop reason: HOSPADM

## 2022-07-11 RX ORDER — MORPHINE SULFATE 2 MG/ML
2 INJECTION, SOLUTION INTRAMUSCULAR; INTRAVENOUS
Status: DISCONTINUED | OUTPATIENT
Start: 2022-07-11 | End: 2022-07-11

## 2022-07-11 RX ORDER — FLUMAZENIL 0.1 MG/ML
0.2 INJECTION INTRAVENOUS
Status: DISCONTINUED | OUTPATIENT
Start: 2022-07-11 | End: 2022-07-11 | Stop reason: HOSPADM

## 2022-07-11 RX ORDER — DEXTROSE MONOHYDRATE 100 MG/ML
INJECTION, SOLUTION INTRAVENOUS
Status: DISCONTINUED
Start: 2022-07-11 | End: 2022-07-12

## 2022-07-11 RX ORDER — PANTOPRAZOLE SODIUM 40 MG/1
40 TABLET, DELAYED RELEASE ORAL
Status: DISCONTINUED | OUTPATIENT
Start: 2022-07-11 | End: 2022-07-12 | Stop reason: HOSPADM

## 2022-07-11 RX ORDER — SODIUM CHLORIDE 9 MG/ML
75 INJECTION, SOLUTION INTRAVENOUS CONTINUOUS
Status: DISCONTINUED | OUTPATIENT
Start: 2022-07-11 | End: 2022-07-11 | Stop reason: HOSPADM

## 2022-07-11 RX ORDER — NALOXONE HYDROCHLORIDE 0.4 MG/ML
0.4 INJECTION, SOLUTION INTRAMUSCULAR; INTRAVENOUS; SUBCUTANEOUS
Status: DISCONTINUED | OUTPATIENT
Start: 2022-07-11 | End: 2022-07-11 | Stop reason: HOSPADM

## 2022-07-11 RX ADMIN — Medication 80 MCG: at 11:52

## 2022-07-11 RX ADMIN — DEXMEDETOMIDINE HYDROCHLORIDE 6 MCG: 100 INJECTION, SOLUTION, CONCENTRATE INTRAVENOUS at 11:37

## 2022-07-11 RX ADMIN — CARVEDILOL 12.5 MG: 12.5 TABLET, FILM COATED ORAL at 08:50

## 2022-07-11 RX ADMIN — PREGABALIN 75 MG: 50 CAPSULE ORAL at 21:24

## 2022-07-11 RX ADMIN — ROSUVASTATIN 20 MG: 20 TABLET, FILM COATED ORAL at 21:24

## 2022-07-11 RX ADMIN — ALUMINUM HYDROXIDE AND MAGNESIUM HYDROXIDE 15 ML: 200; 200 SUSPENSION ORAL at 03:49

## 2022-07-11 RX ADMIN — SUCRALFATE 1 G: 1 TABLET ORAL at 17:01

## 2022-07-11 RX ADMIN — PROPOFOL 30 MG: 10 INJECTION, EMULSION INTRAVENOUS at 11:49

## 2022-07-11 RX ADMIN — PROPOFOL 10 MG: 10 INJECTION, EMULSION INTRAVENOUS at 11:42

## 2022-07-11 RX ADMIN — AMLODIPINE BESYLATE 5 MG: 5 TABLET ORAL at 08:49

## 2022-07-11 RX ADMIN — TAMSULOSIN HYDROCHLORIDE 0.4 MG: 0.4 CAPSULE ORAL at 08:49

## 2022-07-11 RX ADMIN — PANTOPRAZOLE SODIUM 40 MG: 40 TABLET, DELAYED RELEASE ORAL at 17:01

## 2022-07-11 RX ADMIN — LIDOCAINE HYDROCHLORIDE 100 MG: 20 INJECTION, SOLUTION EPIDURAL; INFILTRATION; INTRACAUDAL; PERINEURAL at 11:38

## 2022-07-11 RX ADMIN — SUCRALFATE 1 G: 1 TABLET ORAL at 21:25

## 2022-07-11 RX ADMIN — MORPHINE SULFATE 2 MG: 2 INJECTION, SOLUTION INTRAMUSCULAR; INTRAVENOUS at 13:03

## 2022-07-11 RX ADMIN — ONDANSETRON 4 MG: 2 INJECTION INTRAMUSCULAR; INTRAVENOUS at 03:49

## 2022-07-11 RX ADMIN — HEPARIN SODIUM 5000 UNITS: 5000 INJECTION INTRAVENOUS; SUBCUTANEOUS at 21:23

## 2022-07-11 RX ADMIN — SODIUM CHLORIDE 75 ML/HR: 9 INJECTION, SOLUTION INTRAVENOUS at 11:28

## 2022-07-11 RX ADMIN — HEPARIN SODIUM 5000 UNITS: 5000 INJECTION INTRAVENOUS; SUBCUTANEOUS at 05:38

## 2022-07-11 RX ADMIN — ONDANSETRON 4 MG: 2 INJECTION INTRAMUSCULAR; INTRAVENOUS at 08:48

## 2022-07-11 RX ADMIN — PROPOFOL 20 MG: 10 INJECTION, EMULSION INTRAVENOUS at 11:51

## 2022-07-11 RX ADMIN — PREGABALIN 75 MG: 50 CAPSULE ORAL at 08:49

## 2022-07-11 RX ADMIN — PROPOFOL 20 MG: 10 INJECTION, EMULSION INTRAVENOUS at 11:39

## 2022-07-11 RX ADMIN — PROPOFOL 10 MG: 10 INJECTION, EMULSION INTRAVENOUS at 11:44

## 2022-07-11 RX ADMIN — ONDANSETRON 4 MG: 4 TABLET, ORALLY DISINTEGRATING ORAL at 21:25

## 2022-07-11 RX ADMIN — HEPARIN SODIUM 5000 UNITS: 5000 INJECTION INTRAVENOUS; SUBCUTANEOUS at 13:38

## 2022-07-11 RX ADMIN — Medication 5 UNITS: at 08:52

## 2022-07-11 RX ADMIN — SODIUM CHLORIDE 125 ML/HR: 9 INJECTION, SOLUTION INTRAVENOUS at 13:39

## 2022-07-11 RX ADMIN — PROPOFOL 100 MG: 10 INJECTION, EMULSION INTRAVENOUS at 11:37

## 2022-07-11 RX ADMIN — SODIUM CHLORIDE 40 MG: 9 INJECTION, SOLUTION INTRAMUSCULAR; INTRAVENOUS; SUBCUTANEOUS at 08:49

## 2022-07-11 RX ADMIN — CARVEDILOL 12.5 MG: 12.5 TABLET, FILM COATED ORAL at 17:01

## 2022-07-11 RX ADMIN — FINASTERIDE 5 MG: 5 TABLET, FILM COATED ORAL at 08:50

## 2022-07-11 RX ADMIN — HYDROCODONE BITARTRATE AND ACETAMINOPHEN 1 TABLET: 5; 325 TABLET ORAL at 21:23

## 2022-07-11 RX ADMIN — SODIUM CHLORIDE, PRESERVATIVE FREE 10 ML: 5 INJECTION INTRAVENOUS at 21:24

## 2022-07-11 RX ADMIN — DULOXETINE 60 MG: 30 CAPSULE, DELAYED RELEASE ORAL at 08:50

## 2022-07-11 RX ADMIN — DEXTROSE MONOHYDRATE 250 ML: 10 INJECTION, SOLUTION INTRAVENOUS at 20:55

## 2022-07-11 RX ADMIN — FAMOTIDINE 20 MG: 10 INJECTION INTRAVENOUS at 08:49

## 2022-07-11 RX ADMIN — SODIUM CHLORIDE, PRESERVATIVE FREE 10 ML: 5 INJECTION INTRAVENOUS at 05:42

## 2022-07-11 RX ADMIN — SODIUM CHLORIDE, PRESERVATIVE FREE 10 ML: 5 INJECTION INTRAVENOUS at 13:05

## 2022-07-11 RX ADMIN — Medication 16 UNITS: at 08:51

## 2022-07-11 RX ADMIN — PROPOFOL 10 MG: 10 INJECTION, EMULSION INTRAVENOUS at 11:47

## 2022-07-11 NOTE — CONSULTS
Gastroenterology Consult  ([place], [place]   for Dr. Nola Parada)     Referring Physician: Dr. Tasha Wilkins    Consult Date: 7/11/2022     Subjective:     Chief Complaint: intractable N/V    History of Present Illness: Petey Kee is a 46 y.o. male with type 1 diabetes who is seen in consultation for intractable N/V. Patient is known to this group as he was previously hospitalized for intractable nausea vomiting in December 2021. His last EGD with this group was in 2018 by Dr. Diogenes Morgan. There was a 1 cm white ulcer in the distal esophagus felt to be either Valeri or healing Lillie-Estevez tear. Cytology brushings were negative for yeast.  Prior EGD in 2015 by Dr. Mirta Avila is revealed grade C esophagitis without bleeding. There was scattered coffee-ground's in the stomach with at least 2 erosions in the body and old blood. H. pylori was negative he was in his usual state of health until Saturday morning, 2 days ago. He woke up with nausea vomiting. He had not eaten breakfast but saw food contents from the night before. I note that he had a normal 2-hour gastric emptying study in November 2021. His hemoglobin A1c is 12. His blood sugar on arrival was 465. He continued vomiting, too many times to count. He was unable to keep down anything. By yesterday afternoon he was vomiting bright red blood. He states in large quantities. His hemoglobin is 10.7 this morning. Yesterday it was 10.8 and 2 weeks ago was 11.5. He has not thrown up since last night. His abdomen is sore from vomiting but no kimani abdominal pain. He had a CT abdomen and pelvis without contrast when he arrived. Aside from distal esophageal mucosal thickening suspicious for esophagitis and a distended bladder with trace pleural effusions there is no other acute pathology in the abdomen to explain his nausea vomiting. He denies any NSAID use. He cannot tell me the color of his stool as he states his vision is going.   He denies any opioid or marijuana use. He did not follow-up with the office after his last hospitalization in December. He states he is compliant with his once a day pantoprazole every morning.   He also reports dysphagia with food sticking in his mid to lower chest.      Past Medical History:   Diagnosis Date    [facility]     [facility]     Chronic kidney disease, stage 3a ([facility]     Depression     Diabetes ([facility]     DKA, type 1 ([facility] 2013    diagnosed age 23    DKA, type 1 ([facility]     H/O noncompliance with medical treatment, presenting hazards to health     MRSA (methicillin resistant staph aureus) culture positive     MRSA (methicillin resistant Staphylococcus aureus)     Face    Noncompliance with medication regimen     Second hand smoke exposure     Seiz[facility]     Seizures ([facility]  or     one episode during [facility]     Past Surgical History:   Procedure Laterality Date    HX HEENT      top left wisdom tooth    HX ORTHOPAEDIC Left     wrist; MCV    UPPER GI ENDOSCOPY,BIOPSY  2018           Family History   Problem Relation Age of Onset    Diabetes Mother     Diabetes Other         neice, type 1      Social History     Tobacco Use    Smoking status: Former Smoker     Packs/day: 0.10     Years: 16.00     Pack years: 1.60     Types: Cigarettes     Start date: 10/4/1999     Quit date: 2020     Years since quittin.3    Smokeless tobacco: Never Used   Substance Use Topics    Alcohol use: No      No Known Allergies  Current Facility-Administered Medications   Medication Dose Route Frequency    famotidine (PF) (PEPCID) 20 mg in 0.9% sodium chloride 10 mL injection  20 mg IntraVENous Q12H    pantoprazole (PROTONIX) 40 mg in 0.9% sodium chloride 10 mL injection  40 mg IntraVENous Q12H    hydrALAZINE (APRESOLINE) 20 mg/mL injection 10 mg  10 mg IntraVENous Q6H PRN    amLODIPine (NORVASC) tablet 5 mg  5 mg Oral DAILY    carvediloL (COREG) tablet 12.5 mg  12.5 mg Oral BID WITH MEALS    DULoxetine (CYMBALTA) capsule 60 mg  60 mg Oral DAILY    finasteride (PROSCAR) tablet 5 mg  5 mg Oral DAILY    insulin glargine (LANTUS) injection 16 Units  16 Units SubCUTAneous DAILY    mupirocin (BACTROBAN) 2 % ointment   Topical DAILY    pregabalin (LYRICA) capsule 75 mg  75 mg Oral BID    rosuvastatin (CRESTOR) tablet 20 mg  20 mg Oral QHS    tamsulosin (FLOMAX) capsule 0.4 mg  0.4 mg Oral DAILY    glucose chewable tablet 16 g  4 Tablet Oral PRN    glucagon (GLUCAGEN) injection 1 mg  1 mg IntraMUSCular PRN    dextrose 10% infusion 0-250 mL  0-250 mL IntraVENous PRN    insulin lispro (HUMALOG) injection   SubCUTAneous AC&HS    0.9% sodium chloride infusion  100 mL/hr IntraVENous CONTINUOUS    sodium chloride (NS) flush 5-40 mL  5-40 mL IntraVENous Q8H    sodium chloride (NS) flush 5-40 mL  5-40 mL IntraVENous PRN    acetaminophen (TYLENOL) tablet 650 mg  650 mg Oral Q6H PRN    Or    acetaminophen (TYLENOL) suppository 650 mg  650 mg Rectal Q6H PRN    polyethylene glycol (MIRALAX) packet 17 g  17 g Oral DAILY PRN    ondansetron (ZOFRAN ODT) tablet 4 mg  4 mg Oral Q8H PRN    Or    ondansetron (ZOFRAN) injection 4 mg  4 mg IntraVENous Q6H PRN    heparin (porcine) injection 5,000 Units  5,000 Units SubCUTAneous Q8H    aluminum-magnesium hydroxide (MAALOX) oral suspension 15 mL  15 mL Oral QID PRN        Review of Systems:  A detailed review of systems was performed as follows:  Constitutional:  Negative  Eyes:  +visual impairment  ENMT:  No nose or sinus problems. Respiratory: No coughing, wheezing or sob  Cardiac:  No chest pain, exertional chest pain or palpitations  Gastrointestinal:  See history of the present illness  :   No pain with urination or hematuria  Musculoskeletal:  No arthritis or hot swollen joints.     Endocrine:  +type 1 diabetes  Psychiatric: No depression or feeling blue  Integumentary:  No skin rash or sensitivity to the sun.  Neurologic:  No stroke or seizure; no numbness or tingling of the extremities. Heme-Lymphatic:  See HPI      Objective:     Physical Exam:  Visit Vitals  BP (!) 169/79   Pulse 93   Temp 97.9 °F (36.6 °C)   Resp 16   Ht 5' 9\" (1.753 m)   Wt 63.5 kg (140 lb)   SpO2 98%   BMI 20.67 kg/m²        Gen: white male in nad  Skin:  Many tattoos   HEENT: Sclerae anicteric. Cardiovascular: Regular rate and rhythm. No murmurs, gallops, or rubs. Respiratory:  Comfortable breathing with no accessory muscle use. Clear breath sounds with no wheezes, rales, or rhonchi. GI:  Abdomen nondistended, soft, and nontender. Normal active bowel sounds. No enlargement of the liver or spleen. No masses palpable. Rectal:  Deferred  Musculoskeletal:  No pitting edema of the lower legs. .  Neurological:  Gross memory appears intact. Patient is alert and oriented. Psychiatric:  Mood appears appropriate with judgement intact. Lymphatic:  No cervical or supraclavicular adenopathy. Lab/Data Review:  Lab Results   Component Value Date/Time    WBC 13.4 (H) 07/11/2022 03:10 AM    Hemoglobin (POC) 18.4 (H) 03/07/2017 09:00 AM    HGB 10.7 (L) 07/11/2022 03:10 AM    Hematocrit (POC) 41 10/22/2020 01:07 PM    HCT 31.8 (L) 07/11/2022 03:10 AM    PLATELET 472 (H) 25/57/1752 03:10 AM    MCV 85.7 07/11/2022 03:10 AM     Lab Results   Component Value Date/Time    Sodium 134 (L) 07/11/2022 03:10 AM    Potassium 3.9 07/11/2022 03:10 AM    Chloride 103 07/11/2022 03:10 AM    CO2 19 (L) 07/11/2022 03:10 AM    Anion gap 12 07/11/2022 03:10 AM    Glucose 303 (H) 07/11/2022 03:10 AM    BUN 46 (H) 07/11/2022 03:10 AM    Creatinine 3.71 (H) 07/11/2022 03:10 AM    BUN/Creatinine ratio 12 07/11/2022 03:10 AM    GFR est AA 22 (L) 07/11/2022 03:10 AM    GFR est non-AA 18 (L) 07/11/2022 03:10 AM    Calcium 8.9 07/11/2022 03:10 AM    Bilirubin, total 0.2 07/10/2022 02:45 PM    Alk.  phosphatase 149 (H) 07/10/2022 02:45 PM    Protein, total 5.7 (L) 07/10/2022 02:45 PM    Albumin 1.5 (L) 07/10/2022 02:45 PM    Globulin 4.2 (H) 07/10/2022 02:45 PM    A-G Ratio 0.4 (L) 07/10/2022 02:45 PM    ALT (SGPT) 28 07/10/2022 02:45 PM    AST (SGOT) 18 07/10/2022 02:45 PM     CT Results (most recent):  Results from [place] encounter on 07/10/22    CT ABD PELV WO CONT    Narrative  EXAM: CT ABD PELV WO CONT    INDICATION: abd pain, n/v    COMPARISON: CT abdomen pelvis May 8, 2022    IV CONTRAST: None. ORAL CONTRAST: None    TECHNIQUE:  Thin axial images were obtained through the abdomen and pelvis. Coronal and  sagittal reformats were generated. CT dose reduction was achieved through use of  a standardized protocol tailored for this examination and automatic exposure  control for dose modulation. The absence of intravenous contrast material reduces the sensitivity for  evaluation of the vasculature and solid organs. FINDINGS:  LOWER THORAX: Trace bilateral pleural effusions. Distal esophageal mucosal  thickening. LIVER: No mass. BILIARY TREE: Gallbladder is within normal limits. CBD is not dilated. SPLEEN: within normal limits. PANCREAS: No focal abnormality. ADRENALS: Unremarkable. KIDNEYS/URETERS: No calculus or hydronephrosis. STOMACH: Unremarkable. SMALL BOWEL: No dilatation or wall thickening. COLON: No dilatation or wall thickening. Scattered mild colonic diverticulosis  without evidence of diverticulitis. APPENDIX: Unremarkable. PERITONEUM: Prominent  RETROPERITONEUM: No lymphadenopathy or aortic aneurysm. REPRODUCTIVE ORGANS: Normal prostate. URINARY BLADDER: Severely distended bladder. BONES: No destructive bone lesion. ABDOMINAL WALL: No mass or hernia. ADDITIONAL COMMENTS: N/A    Impression  1. Distal esophageal mucosal thickening, suspicious for esophagitis. 2. Severely distended bladder without bladder wall emphysema or focal  thickening. 3. Trace bilateral pleural effusions.         Assessment/Plan:     Active Problems:    FELECIA (acute kidney injury) ([facility] (1/2/2020)      Hyperglycemia due to diabetes mellitus ([facility] (7/10/2022)      Intractable nausea and vomiting (7/10/2022)     Hematemesis    Dysphagia    Patient's intractable nausea vomiting could very well be due to his poorly controlled diabetes which slows gastric emptying. However this is his second admission for similar symptoms in the past 6 months and he did not follow-up after the last hospital admission. His last EGD was in 2018. He is reporting kimani hematemesis in large quantities as well as dysphagia. His hemoglobin and vital signs are stable. I suggest an EGD this morning for further evaluation and treatment of the hematemesis and dysphagia. Keep n.p.o. and continue supportive care. Tight glycemic control is paramount. Increase PPI to twice daily. Consider outpatient 4-hour gastric emptying scan.   Patient denies marijuana use but has been positive in the past and I note that a urine drug screen has been ordered by the primary team.    Denise Mckenzie, [id number]  07/11/22  8:47 AM

## 2022-07-11 NOTE — CONSULTS
NEPHROLOGY CONSULT NOTE     Patient: Bernard Duran MRN: [id number]  PCP: Severo Dolin, MD   :     1980  Age:   46 y.o. Sex:  male      Referring physician: Fabio Spurling*  Reason for consultation: 46 y.o. male with Hyperglycemia due to diabetes mellitus ([facility] [E11.65]  FELECIA (acute kidney injury) ([facility] [N17.9]  Intractable nausea and vomiting [T99.3] complicated by FELECIA   Admission Date: 7/10/2022  1:55 PM  LOS: 1 day      ASSESSMENT and PLAN :   FELECIA on CKD:  - suspect 2/2 volume depletion from n/v  - CT showing bladder distention  - bladder scan and place hansen if post void > 250cc   - cont IVF  - daily labs and I/Os    CKD 4:  - baseline Cr 2 to 2.5  - likely 2/2 DM and HTN    N/V:  - EGD showing gastritis and esophagitis  - per GI/primary team    Hypovolemia:  - cont IVF    Hx of urinary retention:  - cont flomax and proscar  - bladder scan as above    Type 1 DM:  - per primary team    Hx of bipolar d/o     Active Problems / Assessment AAActive  : Active Problems:    [facility] ([facility] (2020)      Hyperglycemia due to diabetes mellitus ([facility] (7/10/2022)      Intractable nausea and vomiting (7/10/2022)         Subjective:   HPI: Bernard Duran is a 46 y.o.  male who has been admitted to the hospital for nausea and vomiting. He has hx of CKD 4, baseline Cr appears to be around 2 to 2.5 at best.  Claims he is followed by nephrology at [facility], but I could find no records of this. Has a hx of Type 1 DM, multiple admissions for FELECIA in the setting of DKA. Has not seen a nephrologist during any of these admissions. Also has a hx of bipolar d/o, sz d/o. Had EGD this AM showing erosive esophagitis and gastric erosions. Cr 3/4 on admission, up to 3.7 today. CT scan on admission showed bladder distention, no hydronephrosis. He has a hx of urinary retention in the past and has seen urology at [facility] for this.   He is voiding here, made about  1.3 of UOP in the past 24 hrs. No cp, sob, fevers or chills. Is c/o of ongoing nausea. Past Medical Hx:   Past Medical History:   Diagnosis Date    Bipolar 1 disorder, depressed ([facility]     Bipolar disorder ([address]     Chronic kidney disease, stage 3a ([address]     Depression     Diabetes ([facility]     DKA, type 1 ([address] 1/27/2013    diagnosed age 23    DKA, type 1 ([address]     H/O noncompliance with medical treatment, presenting hazards to health     MRSA (methicillin resistant staph aureus) culture positive     MRSA (methicillin resistant Staphylococcus aureus)     Face    Noncompliance with medication regimen     Second hand smoke exposure     Seizure ([facility]     Seizures ([facility] 2006 or 2007    one episode during group home        Past Surgical Hx:     Past Surgical History:   Procedure Laterality Date    HX HEENT      top left wisdom tooth    HX ORTHOPAEDIC Left     wrist; MCV    UPPER GI ENDOSCOPY,BIOPSY  11/20/2018            Medications:  Prior to Admission medications    Medication Sig Start Date End Date Taking? Authorizing Provider   Dexcom G6  misc Use with the dexcom device to check blood sugars 4 times a day 7/1/22   Chalino Sanders MD   DULoxetine (CYMBALTA) 60 mg capsule Take 1 capsule by mouth once daily 6/22/22   Roscoe Cain MD   pantoprazole (PROTONIX) 40 mg tablet Take 1 tablet by mouth once daily 6/22/22   Roscoe Cain MD   Dexcom G6 Sensor brandi Use as directed every 10 days 6/21/22   Chalino Sanders MD   Dexcom G6 Transmitter brandi Use as directed 6/21/22   Chalino Sanders MD   FreeStyle Connor 14 Day Sensor kit Use as directed every 14 days 6/21/22   Chalino Sanders MD   Insulin Needles, Disposable, 31 gauge x 5/16\" ndle Dx code E11.65, use 6x daily 6/21/22   Chalino Sanders MD   insulin glargine (LANTUS) 100 unit/mL injection 16 Units by SubCUTAneous route daily.  6/21/22   Chalino Sanders MD   insulin aspart U-100 (NovoLOG Flexpen U-100 Insulin) 100 unit/mL (3 mL) inpn Take 1 unit for every 15 grams of carbohydrates, 1 unit for every 50 points >150. Max daily dose 25U. 6/21/22   Adilene Harper MD   rosuvastatin (CRESTOR) 20 mg tablet Take 1 Tablet by mouth nightly. 6/2/22   Rubia Estes MD   amLODIPine (NORVASC) 5 mg tablet Take 1 Tablet by mouth daily. 5/19/22   Rubia Estes MD   carvediloL (COREG) 12.5 mg tablet Take 1 Tablet by mouth two (2) times daily (with meals). 5/19/22   Rubia Estes MD   mupirocin (BACTROBAN) 2 % ointment Apply  to affected area daily. Apply until symptoms resolve 5/17/22   David Mar MD   tamsulosin [facility]) 0.4 mg capsule Take 0.4 mg by mouth daily. Provider, Historical   naloxone (Narcan) 4 mg/actuation nasal spray Use 1 spray intranasally, then discard. Repeat with new spray every 2 min as needed for opioid overdose symptoms, alternating nostrils. 2/23/22   Rubia Estes MD   pen needle, diabetic 30 gauge x 3/16\" ndle 5 Units by Does Not Apply route Before breakfast, lunch, and dinner. 2/23/22   Rubia Estes MD   finasteride (PROSCAR) 5 mg tablet Take 1 Tablet by mouth daily. 2/15/22   Severo Hartigan, MD   ondansetron (ZOFRAN ODT) 4 mg disintegrating tablet Take 1 Tablet by mouth every eight (8) hours as needed for Nausea or Vomiting. 12/28/21   Ace Pritchett MD   ergocalciferol (ERGOCALCIFEROL) 1,250 mcg (50,000 unit) capsule Take 1 capsule by mouth once a week  Patient not taking: Reported on 5/10/2022 10/30/21   Rubia Estes MD   pregabalin (Lyrica) 75 mg capsule Take 75 mg by mouth two (2) times a day. Provider, Historical       No Known Allergies    Social Hx:  reports that he quit smoking about 2 years ago. His smoking use included cigarettes. He started smoking about 22 years ago. He has a 1.60 pack-year smoking history. He has never used smokeless tobacco. He reports that he does not drink alcohol and does not use drugs.      Family History   Problem Relation Age of Onset    Diabetes Mother     Diabetes Surendra babb, type 1        Review of Systems:  A twelve point review of system was performed today. Pertinent positives and negatives are mentioned in the HPI. The reminder of the ROS is negative and noncontributory. Objective:    Vitals:    Vitals:    07/11/22 1233 07/11/22 1236 07/11/22 1239 07/11/22 1257   BP: 116/69 110/72 114/72 129/75   Pulse: 74 71 72 75   Resp: 14 10 14 14   Temp:    97.4 °F (36.3 °C)   SpO2: 97% 98% 96% 99%   Weight:       Height:         I&O's:  07/10 0701 - 07/11 0700  In: -   Out: 1290 [Urine:1290]  Visit Vitals  /75 (BP 1 Location: Left upper arm, BP Patient Position: At rest)   Pulse 75   Temp 97.4 °F (36.3 °C)   Resp 14   Ht 5' 9\" (1.753 m)   Wt 63.5 kg (140 lb)   SpO2 99%   BMI 20.67 kg/m²       Physical Exam:  General:Alert, No distress,   HEENT: Eyes are PERRL. Conjunctiva without pallor ,erythema. The sclerae without icterus. .   Neck:Supple,no mass palpable  Lungs : Clears to auscultation Bilaterally, Normal respiratory effort  CVS: RRR, S1 S2 normal, No rub,  no LE edema  Abdomen: Soft, Non tender, No hepatosplenomegaly, bowel sounds present  Extremities: No cyanosis, No clubbing  Skin: No rash or lesions.   Lymph nodes: No palpable nodes  MS: No joint swelling, erythema, warmth  Neurologic: non focal, AAO x 3  Psych: normal affect    Laboratory Results:    Lab Results   Component Value Date    BUN 46 (H) 07/11/2022     (L) 07/11/2022    K 3.9 07/11/2022     07/11/2022    CO2 19 (L) 07/11/2022       Lab Results   Component Value Date    BUN 46 (H) 07/11/2022    BUN 43 (H) 07/10/2022    BUN 34 (H) 06/25/2022    BUN 49 (H) 06/09/2022    BUN 64 (H) 06/08/2022    K 3.9 07/11/2022    K 3.8 07/10/2022    K 3.6 06/25/2022    K 3.1 (L) 06/09/2022    K 3.6 06/08/2022       Lab Results   Component Value Date    WBC 13.4 (H) 07/11/2022    RBC 3.71 (L) 07/11/2022    HGB 10.7 (L) 07/11/2022    HCT 31.8 (L) 07/11/2022    MCV 85.7 07/11/2022    MCH 28.8 07/11/2022    RDW 15.7 (H) 07/11/2022     (H) 07/11/2022       Lab Results   Component Value Date    PHOS 4.4 06/08/2022       Urine dipstick:   Lab Results   Component Value Date/Time    Color YELLOW/STRAW 07/10/2022 01:48 PM    Appearance CLOUDY (A) 07/10/2022 01:48 PM    Specific gravity 1.019 07/10/2022 01:48 PM    Specific gravity 1.020 05/08/2022 09:06 PM    pH (UA) 6.0 07/10/2022 01:48 PM    Protein >300 (A) 07/10/2022 01:48 PM    Glucose >1,000 (A) 07/10/2022 01:48 PM    Ketone 15 (A) 07/10/2022 01:48 PM    Bilirubin Negative 07/10/2022 01:48 PM    Urobilinogen 0.2 07/10/2022 01:48 PM    Nitrites Negative 07/10/2022 01:48 PM    Leukocyte Esterase Negative 07/10/2022 01:48 PM    Epithelial cells FEW 07/10/2022 01:48 PM    Bacteria Negative 07/10/2022 01:48 PM    WBC 20-50 07/10/2022 01:48 PM    RBC 0-5 07/10/2022 01:48 PM               Thank you for allowing us to participate in the care of this patient. We will follow patient. Please dont hesitate to call with any questions    Cierra Lunsford MD  7/11/2022    [facility] [address]  Phone - [phone]   Fax - [phone]  [email or web address]

## 2022-07-11 NOTE — PROCEDURES
NAME:  Brady Morales   :   1980   MRN:   [id number]     Date/Time:  2022 11:48 AM    Esophagogastroduodenoscopy (EGD) Procedure Note    : Lady Nicole MD    Staff: Endoscopy Seamusmer Sake: Rigoberto Gonzalez  Endoscopy RN-1: Arnold Edmonds RN     Referring Provider:  Cheri Kerr MD    Anethesia/Sedation:  MAC anesthesia Propofol    Preoperative diagnosis: hematemesis    Postoperative diagnosis: Esophagitis, gastritis, gastric ulcer    Procedure Details     After infom consent was obtained for the procedure, with all risks and benefits of procedure explained the patient was taken to the endoscopy suite and placed in the left lateral decubitus position. Following sequential administration of sedation as per above, the [id number] gastroscope was inserted into the mouth and advanced under direct vision to second portion of the duodenum. A careful inspection was made as the gastroscope was withdrawn, including a retroflexed view of the proximal stomach; findings and interventions are described below. Findings:  Esophagus: severe grade D erosive esophagitis from 25cm to EGJ at 41cm. The more distal 35-41cm with spontaneous oozing and contact friability and spasms. Multiple cold forceps biopsies were obtained of distal esophagus ulcerations with minimal bleeding to r/o CMV, HSV, dysplasia, malignancy. Stomach: multiple gastric erosions <2mm in size in antrum, no stigmata of bleeding. Cold forceps biopsies were obtained antrum, incisura, body, cardia to r/o H pylori. Duodenum/jejunum:normal    EBL: minimal    Complications:   None; patient tolerated the procedure well. Impression:    See Postoperative diagnosis above    Recommendations:  -Continue acid suppression. , -Await pathology. , -No NSAIDS, -OK to advance diet per primary team  - discussed w/ Briana Bermudez    Discharge disposition:  [facility]  Thanh Torrez MD

## 2022-07-11 NOTE — PERIOP NOTES
Endoscopy Case End Note:    5483:  Procedure scope was pre-cleaned, per protocol, at bedside by Krystal SIMPSON      4331:  Report received from anesthesia - [address] [place] [address]. See anesthesia flowsheet for intra-procedure vital signs and events.

## 2022-07-11 NOTE — ED PROVIDER NOTES
EMERGENCY DEPARTMENT HISTORY AND PHYSICAL EXAM      Date: 7/10/2022  Patient Name: Mark Weaver    History of Presenting Illness     Chief Complaint   Patient presents with    Vomiting     vomiting x2 days     Other     facial swelling to R side since this AM       History Provided By: Patient    HPI: Mark Weaver, 46 y.o. male with PMHx significant for bipolar disorder, diabetes with multiple admissions for DKA who presents with chief complaint of nausea and vomiting for last 2 days as well as generalized abdominal pain that is described as sharp. He tells me his blood sugar this morning was 178. Notes he is unable to keep anything down. No diarrhea or urinary symptoms. Also noted some right-sided facial swelling this morning. No fever, chest pain, dyspnea, cough. PCP: Jose Zarco MD    There are no other complaints, changes, or physical findings at this time.     Current Facility-Administered Medications   Medication Dose Route Frequency Provider Last Rate Last Admin    hydrALAZINE (APRESOLINE) 20 mg/mL injection 10 mg  10 mg IntraVENous Q6H PRN Haroon Dudley MD   10 mg at 07/10/22 1915    [START ON 7/11/2022] amLODIPine (NORVASC) tablet 5 mg  5 mg Oral DAILY Elkin Trevino MD        [START ON 7/11/2022] carvediloL (COREG) tablet 12.5 mg  12.5 mg Oral BID WITH MEALS Elkin Trevino MD        [START ON 7/11/2022] DULoxetine (CYMBALTA) capsule 60 mg  60 mg Oral DAILY Elkin Trevino MD        [START ON 7/11/2022] finasteride (PROSCAR) tablet 5 mg  5 mg Oral DAILY Haroon Dudley MD        [START ON 7/11/2022] insulin glargine (LANTUS) injection 16 Units  16 Units SubCUTAneous DAILY Elkin Trevino MD        [START ON 7/11/2022] mupirocin (BACTROBAN) 2 % ointment   Topical DAILY Elkin Trevino MD        pregabalin (LYRICA) capsule 75 mg  75 mg Oral BID Haroon Dudley MD       Floydene Ada rosuvastatin (CRESTOR) tablet 20 mg  20 mg Oral QHS Ofe Mosley MD        [START ON 7/11/2022] tamsulosin [facility]) capsule 0.4 mg  0.4 mg Oral DAILY Ofe Mosley MD        glucose chewable tablet 16 g  4 Tablet Oral PRN Ofe Mosley MD        glucagon [facility]) injection 1 mg  1 mg IntraMUSCular PRN Ofe Mosley MD        dextrose 10% infusion 0-250 mL  0-250 mL IntraVENous PRN Ofe Mosley MD        insulin lispro (HUMALOG) injection   SubCUTAneous AC&HS Ofe Mosley MD        0.9% sodium chloride infusion  100 mL/hr IntraVENous CONTINUOUS Ofe Mosley MD 84 mL/hr at 07/10/22 1920 84 mL/hr at 07/10/22 1920    sodium chloride (NS) flush 5-40 mL  5-40 mL IntraVENous Saqib Lewis MD        sodium chloride (NS) flush 5-40 mL  5-40 mL IntraVENous PRN Ofe Mosley MD        acetaminophen (TYLENOL) tablet 650 mg  650 mg Oral Q6H PRN Ofe Mosley MD        Or    acetaminophen (TYLENOL) suppository 650 mg  650 mg Rectal Q6H PRN Ofe Mosley MD        polyethylene glycol [facility]) packet 17 g  17 g Oral DAILY PRN Ofe Mosley MD        ondansetron (ZOFRAN ODT) tablet 4 mg  4 mg Oral Q8H PRN Ofe Mosley MD        Or    [facility]) injection 4 mg  4 mg IntraVENous Q6H PRN Ofe Mosley MD        heparin (porcine) injection 5,000 Units  5,000 Units SubCUTAneous Saqib Lewis MD        [START ON 7/11/2022] pantoprazole (PROTONIX) 40 mg in 0.9% sodium chloride 10 mL injection  40 mg IntraVENous DAILY Saqib Trevino MD        aluminum-magnesium hydroxide (MAALOX) oral suspension 15 mL  15 mL Oral QID PRN Ofe Mosley MD         Past History     Past Medical History:  Past Medical History:   Diagnosis Date    Bipolar 1 disorder, depressed ([address]     [facility]     [facility] disease, stage [facility]a [facility]     Depression     Diabetes [facility]     DKA, type 1 ([facility] 2013    diagnosed age 23    DKA, type 1 ([facility]     H/O noncompliance with medical treatment, presenting hazards to health     MRSA (methicillin resistant staph aureus) culture positive     MRSA (methicillin resistant Staphylococcus aureus)     Face    Noncompliance with medication regimen     Second hand smoke exposure     [facility]     Seizures ([facility] 2006 or 2007    one episode during group home     Past Surgical History:  Past Surgical History:   Procedure Laterality Date    HX HEENT      top left wisdom tooth    HX ORTHOPAEDIC Left     wrist; MCV    UPPER GI ENDOSCOPY,BIOPSY  2018          Family History:  Family History   Problem Relation Age of Onset    Diabetes Mother     Diabetes Other         neice, type 1      Social History:  Social History     Tobacco Use    Smoking status: Former Smoker     Packs/day: 0.10     Years: 16.00     Pack years: 1.60     Types: Cigarettes     Start date: 10/4/1999     Quit date: 2020     Years since quittin.3    Smokeless tobacco: Never Used   Vaping Use    Vaping Use: Never used   Substance Use Topics    Alcohol use: No    Drug use: No     Allergies:  No Known Allergies  Review of Systems   Review of Systems   Constitutional: Negative for chills and fever. HENT: Positive for facial swelling. Negative for congestion, rhinorrhea and sore throat. Respiratory: Negative for cough and shortness of breath. Cardiovascular: Negative for chest pain. Gastrointestinal: Positive for abdominal pain, nausea and vomiting. Genitourinary: Negative for dysuria and urgency. Skin: Negative for rash. Neurological: Negative for dizziness, light-headedness and headaches. All other systems reviewed and are negative. Physical Exam   Physical Exam  Vitals and nursing note reviewed.    Constitutional:       General: He is not in acute distress. Appearance: He is well-developed. HENT:      Head: Normocephalic and atraumatic. Mouth/Throat:      Dentition: Abnormal dentition. Dental caries present. Pharynx: Oropharynx is clear. Uvula midline. Comments: Mild swelling to the right lower jaw without erythema or induration     No swelling under the tongue, no evidence of Janes's  Eyes:      Conjunctiva/sclera: Conjunctivae normal.      Pupils: Pupils are equal, round, and reactive to light. Cardiovascular:      Rate and Rhythm: Normal rate and regular rhythm. Pulmonary:      Effort: Pulmonary effort is normal. No respiratory distress. Breath sounds: Normal breath sounds. No stridor. Abdominal:      General: There is no distension. Palpations: Abdomen is soft. Tenderness: There is generalized abdominal tenderness. Musculoskeletal:         General: Normal range of motion. Cervical back: Normal range of motion. Skin:     General: Skin is warm and dry. Neurological:      Mental Status: He is alert and oriented to person, place, and time. Diagnostic Study Results   Labs -     Recent Results (from the past 12 hour(s))   SAMPLES BEING HELD    Collection Time: 07/10/22  1:25 PM   Result Value Ref Range    SAMPLES BEING HELD LAV, PST     COMMENT        Add-on orders for these samples will be processed based on acceptable specimen integrity and analyte stability, which may vary by analyte.    URINALYSIS W/ REFLEX CULTURE    Collection Time: 07/10/22  1:48 PM    Specimen: Urine   Result Value Ref Range    Color YELLOW/STRAW      Appearance CLOUDY (A) CLEAR      Specific gravity 1.019      pH (UA) 6.0 5.0 - 8.0      Protein >300 (A) NEG mg/dL    Glucose >1,000 (A) NEG mg/dL    Ketone 15 (A) NEG mg/dL    Bilirubin Negative NEG      Blood MODERATE (A) NEG      Urobilinogen 0.2 0.2 - 1.0 EU/dL    Nitrites Negative NEG      Leukocyte Esterase Negative NEG      WBC 20-50 0 - 4 /hpf    RBC 0-5 0 - 5 /hpf    Epithelial cells FEW FEW /lpf    Bacteria Negative NEG /hpf    UA:UC IF INDICATED URINE CULTURE ORDERED (A) CNI      Hyaline cast 2-5 0 - 5 /lpf    Granular cast 2-5 (A) NEG /lpf    Mixed Cell Cast 2-5 (A) NEG /LPF   GLUCOSE, POC    Collection Time: 07/10/22  2:36 PM   Result Value Ref Range    Glucose (POC) 450 (H) 65 - 117 mg/dL    Performed by Manish REHMAN    CBC W/O DIFF    Collection Time: 07/10/22  2:45 PM   Result Value Ref Range    WBC 11.4 (H) 4.1 - 11.1 K/uL    RBC 3.76 (L) 4.10 - 5.70 M/uL    HGB 10.8 (L) 12.1 - 17.0 g/dL    HCT 31.7 (L) 36.6 - 50.3 %    MCV 84.3 80.0 - 99.0 FL    MCH 28.7 26.0 - 34.0 PG    MCHC 34.1 30.0 - 36.5 g/dL    RDW 15.2 (H) 11.5 - 14.5 %    PLATELET 975 (H) 719 - 400 K/uL    MPV 9.5 8.9 - 12.9 FL    NRBC 0.0 0  WBC    ABSOLUTE NRBC 0.00 0.00 - 5.95 K/uL   METABOLIC PANEL, COMPREHENSIVE    Collection Time: 07/10/22  2:45 PM   Result Value Ref Range    Sodium 132 (L) 136 - 145 mmol/L    Potassium 3.8 3.5 - 5.1 mmol/L    Chloride 102 97 - 108 mmol/L    CO2 17 (L) 21 - 32 mmol/L    Anion gap 13 5 - 15 mmol/L    Glucose 465 (H) 65 - 100 mg/dL    BUN 43 (H) 6 - 20 MG/DL    Creatinine 3.44 (H) 0.70 - 1.30 MG/DL    BUN/Creatinine ratio 13 12 - 20      GFR est AA 24 (L) >60 ml/min/1.73m2    GFR est non-AA 20 (L) >60 ml/min/1.73m2    Calcium 8.9 8.5 - 10.1 MG/DL    Bilirubin, total 0.2 0.2 - 1.0 MG/DL    ALT (SGPT) 28 12 - 78 U/L    AST (SGOT) 18 15 - 37 U/L    Alk.  phosphatase 149 (H) 45 - 117 U/L    Protein, total 5.7 (L) 6.4 - 8.2 g/dL    Albumin 1.5 (L) 3.5 - 5.0 g/dL    Globulin 4.2 (H) 2.0 - 4.0 g/dL    A-G Ratio 0.4 (L) 1.1 - 2.2     LIPASE    Collection Time: 07/10/22  2:45 PM   Result Value Ref Range    Lipase 56 (L) 73 - 393 U/L   GLUCOSE, POC    Collection Time: 07/10/22  6:54 PM   Result Value Ref Range    Glucose (POC) 431 (H) 65 - 117 mg/dL    Performed by [address] [place] [address], POC    Collection Time: 07/10/22  7:54 PM   Result Value Ref Range Glucose (POC) 355 (H) 65 - 117 mg/dL    Performed by Kori REHMAN        Radiologic Studies -   CT ABD PELV WO CONT   Final Result      1. Distal esophageal mucosal thickening, suspicious for esophagitis. 2. Severely distended bladder without bladder wall emphysema or focal   thickening. 3. Trace bilateral pleural effusions. CT ABD PELV WO CONT    Result Date: 7/10/2022  1. Distal esophageal mucosal thickening, suspicious for esophagitis. 2. Severely distended bladder without bladder wall emphysema or focal thickening. 3. Trace bilateral pleural effusions. Medical Decision Making   I am the first provider for this patient. I reviewed the vital signs, available nursing notes, past medical history, past surgical history, family history and social history. Vital Signs-Reviewed the patient's vital signs. Patient Vitals for the past 12 hrs:   Temp Pulse Resp BP SpO2   07/10/22 1931 -- 90 15 (!) 143/89 99 %   07/10/22 1915 -- 91 -- (!) 191/100 --   07/10/22 1830 -- -- -- (!) 188/92 100 %   07/10/22 1800 -- -- -- (!) 207/101 99 %   07/10/22 1745 -- -- -- (!) 211/115 99 %   07/10/22 1720 -- -- -- (!) 160/122 99 %   07/10/22 1600 -- 80 -- (!) 188/98 96 %   07/10/22 1320 97.9 °F (36.6 °C) 88 16 (!) 172/99 97 %       Pulse Oximetry Analysis - 99% on ra    Cardiac Monitor:   Rate: 80 bpm  Rhythm: Normal Sinus Rhythm        Records Reviewed: Nursing Notes and Old Medical Records    Provider Notes (Medical Decision Making):   Patient presents with a chief complaint of 2 days of nausea and vomiting with generalized abdominal pain. Also reports right-sided facial swelling. On presentation he is hypertensive with otherwise stable vital signs. Has mild generalized abdominal tenderness on exam.  He does have some mild right-sided facial swelling without edema or induration. Concern for significant electrolyte derangements given his vomiting as well as history of diabetes.   Will rule out DKA with basic lab work, urinalysis. Also check CT abdomen to rule out any acute abdominal process I think this is less likely. ED Course:   Initial assessment performed. The patients presenting problems have been discussed, and they are in agreement with the care plan formulated and outlined with them. I have encouraged them to ask questions as they arise throughout their visit. Lab work with hyperglycemia, bicarb of 17 with no anion gap. Given insulin and IV fluids. Nausea and pain medication given the patient remains persistently nauseous and continues to vomit. I think he is at high risk for further decompensation if discharged. Discussed with hospitalist for admission. Procedures:  Procedures    Critical Care:  none    Disposition:    Admission Note:  Patient is being admitted to the hospital by Dr. Destiny Tirado, Service: Hospitalist.  The results of their tests and reasons for their admission have been discussed with them and available family. They convey agreement and understanding for the need to be admitted and for their admission diagnosis. Diagnosis     Clinical Impression:   1. Intractable nausea and vomiting    2. Hyperglycemia            Please note that this dictation was completed with [employer], the computer voice recognition software. Quite often unanticipated grammatical, syntax, homophones, and other interpretive errors are inadvertently transcribed by the computer software. Please disregard these errors.   Please excuse any errors that have escaped final proofreading

## 2022-07-11 NOTE — ANESTHESIA PREPROCEDURE EVALUATION
Relevant Problems   CARDIOVASCULAR   (+) HTN (hypertension)      RENAL FAILURE   (+) FELECIA (acute kidney injury) ([facility]   (+) Chronic kidney disease, stage 3a (HCC)   (+) Hydronephrosis of right kidney      ENDOCRINE   (+) DKA, type 1 (HCC)   (+) DKA, type 1, not at goal [facility])   (+) Diabetic neuropathy, type I diabetes mellitus ([facility]       Anesthetic History   No history of anesthetic complications     Pertinent negatives: No PONV       Review of Systems / Medical History  Patient summary reviewed, nursing notes reviewed and pertinent labs reviewed    Pulmonary  Within defined limits                 Neuro/Psych     seizures: well controlled    Psychiatric history     Cardiovascular    Hypertension              Exercise tolerance: >4 METS     GI/Hepatic/Renal  Within defined limits              Endo/Other    Diabetes: type 1         Other Findings              Physical Exam    Airway  Mallampati: II  TM Distance: 4 - 6 cm  Neck ROM: normal range of motion   Mouth opening: Normal     Cardiovascular  Regular rate and rhythm,  S1 and S2 normal,  no murmur, click, rub, or gallop  Rhythm: regular  Rate: normal         Dental  No notable dental hx       Pulmonary  Breath sounds clear to auscultation               Abdominal  GI exam deferred       Other Findings            Anesthetic Plan    ASA: 3  Anesthesia type: MAC, general and total IV anesthesia          Induction: Intravenous  Anesthetic plan and risks discussed with: Patient      Propofol MAC

## 2022-07-11 NOTE — PROGRESS NOTES
Hospitalist Progress Note    Subjective:   Daily Progress Note: 7/11/2022 2:56 PM    Hospital Course:  Pt admitted for acute onset of nausea and vomiting at home, stating looked bloody. He presented with hyperglycemia and CT abdomen/pelvis showing possible esophagitis, distal esophageal mucosal thickening. Subjective: Pt seen in room, complaints of abdominal pain, no vomiting reported.      Current Facility-Administered Medications   Medication Dose Route Frequency    pantoprazole (PROTONIX) 40 mg in 0.9% sodium chloride 10 mL injection  40 mg IntraVENous Q12H    morphine injection 2 mg  2 mg IntraVENous Q4H PRN    0.9% sodium chloride infusion  125 mL/hr IntraVENous CONTINUOUS    hydrALAZINE (APRESOLINE) 20 mg/mL injection 10 mg  10 mg IntraVENous Q6H PRN    amLODIPine (NORVASC) tablet 5 mg  5 mg Oral DAILY    carvediloL (COREG) tablet 12.5 mg  12.5 mg Oral BID WITH MEALS    DULoxetine (CYMBALTA) capsule 60 mg  60 mg Oral DAILY    finasteride (PROSCAR) tablet 5 mg  5 mg Oral DAILY    insulin glargine (LANTUS) injection 16 Units  16 Units SubCUTAneous DAILY    mupirocin (BACTROBAN) 2 % ointment   Topical DAILY    pregabalin (LYRICA) capsule 75 mg  75 mg Oral BID    rosuvastatin (CRESTOR) tablet 20 mg  20 mg Oral QHS    tamsulosin (FLOMAX) capsule 0.4 mg  0.4 mg Oral DAILY    glucose chewable tablet 16 g  4 Tablet Oral PRN    glucagon (GLUCAGEN) injection 1 mg  1 mg IntraMUSCular PRN    dextrose 10 % infusion 0-250 mL  0-250 mL IntraVENous PRN    insulin lispro (HUMALOG) injection   SubCUTAneous AC&HS    sodium chloride (NS) flush 5-40 mL  5-40 mL IntraVENous Q8H    sodium chloride (NS) flush 5-40 mL  5-40 mL IntraVENous PRN    acetaminophen (TYLENOL) tablet 650 mg  650 mg Oral Q6H PRN    Or    acetaminophen (TYLENOL) suppository 650 mg  650 mg Rectal Q6H PRN    polyethylene glycol (MIRALAX) packet 17 g  17 g Oral DAILY PRN    ondansetron (ZOFRAN ODT) tablet 4 mg  4 mg Oral Q8H PRN    Or    ondansetron (ZOFRAN) injection 4 mg  4 mg IntraVENous Q6H PRN    heparin (porcine) injection 5,000 Units  5,000 Units SubCUTAneous Q8H    aluminum-magnesium hydroxide (MAALOX) oral suspension 15 mL  15 mL Oral QID PRN        Review of Systems:    Review of Systems   Constitutional: Negative for chills and fever. Eyes: Negative for blurred vision and double vision. Cardiovascular: Negative for chest pain and palpitations. Gastrointestinal: Positive for abdominal pain, constipation and nausea. Negative for diarrhea and heartburn. Neurological: Negative for dizziness and headaches. Objective:     Visit Vitals  /75 (BP 1 Location: Left upper arm, BP Patient Position: At rest)   Pulse 75   Temp 97.4 °F (36.3 °C)   Resp 14   Ht 5' 9\" (1.753 m)   Wt 63.5 kg (140 lb)   SpO2 99%   BMI 20.67 kg/m²      O2 Device: None (Room air)    Temp (24hrs), Av °F (36.7 °C), Min:97.4 °F (36.3 °C), Max:98.4 °F (36.9 °C)      701 -  190  In: 200 [I.V.:200]  Out: 0   1901 -  0700  In: -   Out: 5490 [Urine:1290]    PHYSICAL EXAM:    Physical Exam  Constitutional:       General: He is not in acute distress. Cardiovascular:      Rate and Rhythm: Normal rate and regular rhythm. Pulses: Normal pulses. Heart sounds: Normal heart sounds. Pulmonary:      Effort: Pulmonary effort is normal.      Breath sounds: Normal breath sounds. Abdominal:      Tenderness: There is abdominal tenderness. Musculoskeletal:         General: Normal range of motion. Skin:     General: Skin is warm and dry. Neurological:      Mental Status: He is oriented to person, place, and time.    Psychiatric:         Mood and Affect: Mood normal.         Behavior: Behavior normal.            Data Review    Recent Results (from the past 24 hour(s))   GLUCOSE, POC    Collection Time: 07/10/22  6:54 PM   Result Value Ref Range    Glucose (POC) 431 (H) 65 - 117 mg/dL    Performed by Pastora Kan    HEMOGLOBIN A1C WITH EAG    Collection Time: 07/10/22  7:51 PM   Result Value Ref Range    Hemoglobin A1c 12.1 (H) 4.0 - 5.6 %    Est. average glucose 301 mg/dL   GLUCOSE, POC    Collection Time: 07/10/22  7:54 PM   Result Value Ref Range    Glucose (POC) 355 (H) 65 - 117 mg/dL    Performed by Lali REHMAN    GLUCOSE, POC    Collection Time: 07/10/22  9:25 PM   Result Value Ref Range    Glucose (POC) 437 (H) 65 - 117 mg/dL    Performed by David Guerrero    CREATININE, UR, RANDOM    Collection Time: 07/11/22  3:10 AM   Result Value Ref Range    Creatinine, urine 39.40 mg/dL   CHLORIDE, URINE RANDOM    Collection Time: 07/11/22  3:10 AM   Result Value Ref Range    Chloride,urine random 24 MMOL/L   METABOLIC PANEL, BASIC    Collection Time: 07/11/22  3:10 AM   Result Value Ref Range    Sodium 134 (L) 136 - 145 mmol/L    Potassium 3.9 3.5 - 5.1 mmol/L    Chloride 103 97 - 108 mmol/L    CO2 19 (L) 21 - 32 mmol/L    Anion gap 12 5 - 15 mmol/L    Glucose 303 (H) 65 - 100 mg/dL    BUN 46 (H) 6 - 20 MG/DL    Creatinine 3.71 (H) 0.70 - 1.30 MG/DL    BUN/Creatinine ratio 12 12 - 20      GFR est AA 22 (L) >60 ml/min/1.73m2    GFR est non-AA 18 (L) >60 ml/min/1.73m2    Calcium 8.9 8.5 - 10.1 MG/DL   MAGNESIUM    Collection Time: 07/11/22  3:10 AM   Result Value Ref Range    Magnesium 2.6 (H) 1.6 - 2.4 mg/dL   CBC WITH AUTOMATED DIFF    Collection Time: 07/11/22  3:10 AM   Result Value Ref Range    WBC 13.4 (H) 4.1 - 11.1 K/uL    RBC 3.71 (L) 4.10 - 5.70 M/uL    HGB 10.7 (L) 12.1 - 17.0 g/dL    HCT 31.8 (L) 36.6 - 50.3 %    MCV 85.7 80.0 - 99.0 FL    MCH 28.8 26.0 - 34.0 PG    MCHC 33.6 30.0 - 36.5 g/dL    RDW 15.7 (H) 11.5 - 14.5 %    PLATELET 452 (H) 195 - 400 K/uL    MPV 10.0 8.9 - 12.9 FL    NRBC 0.0 0  WBC    ABSOLUTE NRBC 0.00 0.00 - 0.01 K/uL    NEUTROPHILS 76 (H) 32 - 75 %    LYMPHOCYTES 16 12 - 49 %    MONOCYTES 7 5 - 13 %    EOSINOPHILS 0 0 - 7 %    BASOPHILS 0 0 - 1 %    IMMATURE GRANULOCYTES 1 (H) 0.0 - 0.5 %    ABS. NEUTROPHILS 10.2 (H) 1.8 - 8.0 K/UL    ABS. LYMPHOCYTES 2.1 0.8 - 3.5 K/UL    ABS. MONOCYTES 1.0 0.0 - 1.0 K/UL    ABS. EOSINOPHILS 0.0 0.0 - 0.4 K/UL    ABS. BASOPHILS 0.0 0.0 - 0.1 K/UL    ABS. IMM. GRANS. 0.1 (H) 0.00 - 0.04 K/UL    DF AUTOMATED     SODIUM, UR, RANDOM    Collection Time: 07/11/22  3:10 AM   Result Value Ref Range    Sodium,urine random 33 MMOL/L   GLUCOSE, POC    Collection Time: 07/11/22  6:58 AM   Result Value Ref Range    Glucose (POC) 271 (H) 65 - 117 mg/dL    Performed by Rashmi Wagoner    GLUCOSE, POC    Collection Time: 07/11/22 11:29 AM   Result Value Ref Range    Glucose (POC) 141 (H) 65 - 117 mg/dL    Performed by [facility] RN    GLUCOSE, POC    Collection Time: 07/11/22 12:33 PM   Result Value Ref Range    Glucose (POC) 125 (H) 65 - 117 mg/dL    Performed by Zarina Shah RN        CT ABD PELV WO CONT   Final Result      1. Distal esophageal mucosal thickening, suspicious for esophagitis. 2. Severely distended bladder without bladder wall emphysema or focal   thickening. 3. Trace bilateral pleural effusions. Active Problems:    FELECIA (acute kidney injury) ([facility] [address] (1/2/2020)      Hyperglycemia due to diabetes mellitus ([facility] [address] (7/10/2022)      Intractable nausea and vomiting (7/10/2022)        Assessment/Plan:   1. Nausea/vomiting/abdominal pain- secondary to esophagitis and gastritis,   GI consulted, EGD showing severe erosive esophagitis and gastric erosions , continue PPI. Avoid NSAIDS, asa    2. CKD stage 4- creatinine above baseline , nephrology consulted,  Continue IV fluids    3. Hx of urinary retention- on flomax, proscar, bladder scan insert hansen for urine residual greater than  250 ml,     4. Hypertension- on coreg, norvasc    5. DM II- uncontrolled, HGA1c is 12.1, adjust basal insulin doses  Continue s/s coverage. 6. Hx of depression- on cymbalta    7. Peripheral neuropathy-on lyrica. 8. Discharge- anticipate next 24-48 hrs.            DVT Prophylaxis: heparin sq  Code Status:  Full Code  POA: mother Unknown [place]     HRWZ SJYX discussed with:   _______patient, staff nurse,________________________________________________________    Joshua Kent NP

## 2022-07-11 NOTE — PERIOP NOTES
TRANSFER - IN REPORT:    Verbal report received from Colton RN (name) on Leanord Prim  being received from Med Tele room 2113 (unit) for ordered procedure      Report consisted of patients Situation, Background, Assessment and   Recommendations(SBAR). Information from the following report(s) SBAR, Procedure Summary, Intake/Output, MAR and Recent Results was reviewed with the receiving nurse. Opportunity for questions and clarification was provided. Will give report to primary Endoscopy nurse upon arrival to unit.

## 2022-07-11 NOTE — ROUTINE PROCESS
Shirley Ramon  1980  [id number]    Situation:  Verbal report received from: MARCI Plaza RN  Procedure: Procedure(s):  ESOPHAGOGASTRODUODENOSCOPY (EGD)  ESOPHAGOGASTRODUODENAL (EGD) BIOPSY    Background:    Preoperative diagnosis: hematemesis  Postoperative diagnosis: Esophagitis, gastritis, gastric ulcer    :  Dr. Volodymyr Rodriguez  Assistant(s): Endoscopy Technician-1: Emerita Amos  Endoscopy RN-1: Nancy Tirado RN    Specimens:   ID Type Source Tests Collected by Time Destination   1 : Gastric biopsy Preservative Gastric  Abdirashid Pandya MD 7/11/2022 1144 Pathology   2 : Esophagus biopsy Graceative   Abdirashid Pandya MD 7/11/2022 1147 Pathology     H. Pylori  no    Assessment:  Intra-procedure medications     Anesthesia gave intra-procedure sedation and medications, see anesthesia flow sheet yes    Intravenous fluids: NS@ KVO     Vital signs stable   yes    Abdominal assessment: round and soft   yes    Recommendation:    Return to floor  Yes, inpatient, room 2113  Family or Friend   No one in room per patient  Permission to share finding with family or friend n/a

## 2022-07-11 NOTE — PROGRESS NOTES
Occupational Therapy Screening:  Services are not indicated at this time. [facility] therapy based on results obtained during the nursing admission assessment. The patients chart was reviewed and the patient is not appropriate for a skilled therapy evaluation at this time. Please consult occupational therapy if any therapy needs arise. Thank you.     Cortez Manzo OTR/L

## 2022-07-11 NOTE — PROGRESS NOTES
Attempted to deliver and verbally explain the Jolane Reamer with patient. Patient was KAJAL.  Antionette Chaudhari, Care Management Assistant

## 2022-07-11 NOTE — PERIOP NOTES
TRANSFER - OUT REPORT:    Verbal report given to [facility] RN(name) on Abhinav Thompson  being transferred to [place] [address](unit) for routine progression of care       Report consisted of patients Situation, Background, Assessment and   Recommendations(SBAR). Information from the following report(s) SBAR was reviewed with the receiving nurse. Lines:   Peripheral IV 07/10/22 Posterior;Right Hand (Active)   Site Assessment Clean, dry, & intact 07/11/22 0403   Phlebitis Assessment 0 07/11/22 0403   Infiltration Assessment 0 07/11/22 0403   Dressing Status Clean, dry, & intact 07/11/22 0403        Opportunity for questions and clarification was provided.

## 2022-07-11 NOTE — ANESTHESIA POSTPROCEDURE EVALUATION
Procedure(s):  ESOPHAGOGASTRODUODENOSCOPY (EGD)  ESOPHAGOGASTRODUODENAL (EGD) BIOPSY. MAC, general, total IV anesthesia    Anesthesia Post Evaluation        Patient location during evaluation: PACU  Note status: Adequate. Level of consciousness: responsive to verbal stimuli and sleepy but conscious  Pain management: satisfactory to patient  Airway patency: patent  Anesthetic complications: no  Cardiovascular status: acceptable  Respiratory status: acceptable  Hydration status: acceptable  Comments: +Post-Anesthesia Evaluation and Assessment    Patient: Tammy Palm MRN: [id number]  SSN: [id number]   YOB: 1980  Age: 46 y.o. Sex: male      Cardiovascular Function/Vital Signs    /75 (BP 1 Location: Left upper arm, BP Patient Position: At rest)   Pulse 75   Temp 36.3 °C (97.4 °F)   Resp 14   Ht 5' 9\" (1.753 m)   Wt 63.5 kg (140 lb)   SpO2 99%   BMI 20.67 kg/m²     Patient is status post Procedure(s):  ESOPHAGOGASTRODUODENOSCOPY (EGD)  ESOPHAGOGASTRODUODENAL (EGD) BIOPSY. Nausea/Vomiting: Controlled. Postoperative hydration reviewed and adequate. Pain:  Pain Scale 1: Numeric (0 - 10) (07/11/22 1257)  Pain Intensity 1: 5 (07/11/22 1257)   Managed. Neurological Status: At baseline. Mental Status and Level of Consciousness: Arousable. Pulmonary Status:   O2 Device: None (Room air) (07/11/22 1257)   Adequate oxygenation and airway patent. Complications related to anesthesia: None    Post-anesthesia assessment completed. No concerns. Signed By: Jimena Norman MD    7/11/2022  Post anesthesia nausea and vomiting:  controlled      INITIAL Post-op Vital signs:   Vitals Value Taken Time   /75 07/11/22 1257   Temp 36.3 °C (97.4 °F) 07/11/22 1257   Pulse 89 07/11/22 1259   Resp 19 07/11/22 1259   SpO2 95 % 07/11/22 1259   Vitals shown include unvalidated device data.

## 2022-07-12 VITALS
HEIGHT: 69 IN | OXYGEN SATURATION: 97 % | HEART RATE: 72 BPM | RESPIRATION RATE: 18 BRPM | SYSTOLIC BLOOD PRESSURE: 175 MMHG | BODY MASS INDEX: 20.73 KG/M2 | DIASTOLIC BLOOD PRESSURE: 99 MMHG | TEMPERATURE: 97.5 F | WEIGHT: 140 LBS

## 2022-07-12 LAB
ALBUMIN SERPL-MCNC: 1.5 G/DL (ref 3.5–5)
ANION GAP SERPL CALC-SCNC: 6 MMOL/L (ref 5–15)
BUN SERPL-MCNC: 40 MG/DL (ref 6–20)
BUN/CREAT SERPL: 11 (ref 12–20)
CALCIUM SERPL-MCNC: 8.2 MG/DL (ref 8.5–10.1)
CHLORIDE SERPL-SCNC: 107 MMOL/L (ref 97–108)
CO2 SERPL-SCNC: 23 MMOL/L (ref 21–32)
CREAT SERPL-MCNC: 3.48 MG/DL (ref 0.7–1.3)
CREAT UR-MCNC: 55.4 MG/DL
ERYTHROCYTE [DISTWIDTH] IN BLOOD BY AUTOMATED COUNT: 15.7 % (ref 11.5–14.5)
GLUCOSE BLD STRIP.AUTO-MCNC: 101 MG/DL (ref 65–117)
GLUCOSE BLD STRIP.AUTO-MCNC: 106 MG/DL (ref 65–117)
GLUCOSE SERPL-MCNC: 57 MG/DL (ref 65–100)
HCT VFR BLD AUTO: 26.6 % (ref 36.6–50.3)
HGB BLD-MCNC: 8.9 G/DL (ref 12.1–17)
MCH RBC QN AUTO: 29 PG (ref 26–34)
MCHC RBC AUTO-ENTMCNC: 33.5 G/DL (ref 30–36.5)
MCV RBC AUTO: 86.6 FL (ref 80–99)
NRBC # BLD: 0 K/UL (ref 0–0.01)
NRBC BLD-RTO: 0 PER 100 WBC
PHOSPHATE SERPL-MCNC: 4.8 MG/DL (ref 2.6–4.7)
PLATELET # BLD AUTO: 491 K/UL (ref 150–400)
PMV BLD AUTO: 9.6 FL (ref 8.9–12.9)
POTASSIUM SERPL-SCNC: 3.6 MMOL/L (ref 3.5–5.1)
PROT UR-MCNC: 607 MG/DL (ref 0–11.9)
PROT/CREAT UR-RTO: 11
RBC # BLD AUTO: 3.07 M/UL (ref 4.1–5.7)
SERVICE CMNT-IMP: NORMAL
SERVICE CMNT-IMP: NORMAL
SODIUM SERPL-SCNC: 136 MMOL/L (ref 136–145)
WBC # BLD AUTO: 8.1 K/UL (ref 4.1–11.1)

## 2022-07-12 PROCEDURE — 74011250637 HC RX REV CODE- 250/637: Performed by: NURSE PRACTITIONER

## 2022-07-12 PROCEDURE — 96372 THER/PROPH/DIAG INJ SC/IM: CPT

## 2022-07-12 PROCEDURE — 36415 COLL VENOUS BLD VENIPUNCTURE: CPT

## 2022-07-12 PROCEDURE — G0378 HOSPITAL OBSERVATION PER HR: HCPCS

## 2022-07-12 PROCEDURE — 84156 ASSAY OF PROTEIN URINE: CPT

## 2022-07-12 PROCEDURE — 82962 GLUCOSE BLOOD TEST: CPT

## 2022-07-12 PROCEDURE — 80069 RENAL FUNCTION PANEL: CPT

## 2022-07-12 PROCEDURE — 85027 COMPLETE CBC AUTOMATED: CPT

## 2022-07-12 PROCEDURE — 74011250637 HC RX REV CODE- 250/637: Performed by: INTERNAL MEDICINE

## 2022-07-12 PROCEDURE — 74011250636 HC RX REV CODE- 250/636: Performed by: INTERNAL MEDICINE

## 2022-07-12 PROCEDURE — 74011000250 HC RX REV CODE- 250: Performed by: INTERNAL MEDICINE

## 2022-07-12 PROCEDURE — 74011636637 HC RX REV CODE- 636/637: Performed by: INTERNAL MEDICINE

## 2022-07-12 RX ORDER — SUCRALFATE 1 G/1
1 TABLET ORAL
Qty: 56 TABLET | Refills: 0 | Status: SHIPPED | OUTPATIENT
Start: 2022-07-12 | End: 2022-07-26

## 2022-07-12 RX ORDER — ONDANSETRON 4 MG/1
4 TABLET, ORALLY DISINTEGRATING ORAL
Qty: 90 TABLET | Refills: 0 | Status: SHIPPED | OUTPATIENT
Start: 2022-07-12 | End: 2022-08-20

## 2022-07-12 RX ORDER — PANTOPRAZOLE SODIUM 40 MG/1
40 TABLET, DELAYED RELEASE ORAL
Qty: 60 TABLET | Refills: 0 | Status: SHIPPED | OUTPATIENT
Start: 2022-07-12 | End: 2022-08-20

## 2022-07-12 RX ORDER — LANOLIN ALCOHOL/MO/W.PET/CERES
325 CREAM (GRAM) TOPICAL
Qty: 30 TABLET | Refills: 0 | Status: SHIPPED | OUTPATIENT
Start: 2022-07-12 | End: 2022-08-20

## 2022-07-12 RX ORDER — LANOLIN ALCOHOL/MO/W.PET/CERES
1 CREAM (GRAM) TOPICAL
Status: DISCONTINUED | OUTPATIENT
Start: 2022-07-12 | End: 2022-07-12 | Stop reason: HOSPADM

## 2022-07-12 RX ORDER — ACETAMINOPHEN 325 MG/1
650 TABLET ORAL
Qty: 180 TABLET | Refills: 0 | Status: SHIPPED
Start: 2022-07-12 | End: 2022-08-20

## 2022-07-12 RX ADMIN — TAMSULOSIN HYDROCHLORIDE 0.4 MG: 0.4 CAPSULE ORAL at 08:43

## 2022-07-12 RX ADMIN — FINASTERIDE 5 MG: 5 TABLET, FILM COATED ORAL at 08:43

## 2022-07-12 RX ADMIN — CARVEDILOL 12.5 MG: 12.5 TABLET, FILM COATED ORAL at 08:44

## 2022-07-12 RX ADMIN — POLYETHYLENE GLYCOL 3350 17 G: 17 POWDER, FOR SOLUTION ORAL at 08:46

## 2022-07-12 RX ADMIN — AMLODIPINE BESYLATE 5 MG: 5 TABLET ORAL at 08:44

## 2022-07-12 RX ADMIN — HEPARIN SODIUM 5000 UNITS: 5000 INJECTION INTRAVENOUS; SUBCUTANEOUS at 05:24

## 2022-07-12 RX ADMIN — Medication 16 UNITS: at 09:00

## 2022-07-12 RX ADMIN — HYDROCODONE BITARTRATE AND ACETAMINOPHEN 1 TABLET: 5; 325 TABLET ORAL at 04:10

## 2022-07-12 RX ADMIN — PANTOPRAZOLE SODIUM 40 MG: 40 TABLET, DELAYED RELEASE ORAL at 08:43

## 2022-07-12 RX ADMIN — SODIUM CHLORIDE, PRESERVATIVE FREE 10 ML: 5 INJECTION INTRAVENOUS at 05:24

## 2022-07-12 RX ADMIN — FERROUS SULFATE TAB 325 MG (65 MG ELEMENTAL FE) 325 MG: 325 (65 FE) TAB at 08:43

## 2022-07-12 RX ADMIN — DEXTROSE MONOHYDRATE 250 ML: 10 INJECTION, SOLUTION INTRAVENOUS at 05:24

## 2022-07-12 RX ADMIN — SUCRALFATE 1 G: 1 TABLET ORAL at 08:42

## 2022-07-12 RX ADMIN — PREGABALIN 75 MG: 50 CAPSULE ORAL at 08:42

## 2022-07-12 RX ADMIN — DULOXETINE 60 MG: 30 CAPSULE, DELAYED RELEASE ORAL at 08:42

## 2022-07-12 NOTE — DISCHARGE SUMMARY
Hospitalist Discharge Summary     Patient ID:    Michael Su  [id number]  23 y.o.  1980    Admit date: 7/10/2022    Discharge date : 7/12/2022    Chronic Diagnoses:    Problem List as of 7/12/2022 Date Reviewed: 6/21/2022          Codes Class Noted - Resolved    Hyperglycemia due to diabetes mellitus ([facility] ICD-10-CM: E11.65  ICD-9-CM: 250.02  7/10/2022 - Present        Intractable nausea and vomiting ICD-10-CM: R11.2  ICD-9-CM: 536.2  7/10/2022 - Present        Chronic kidney disease, stage 3a ([facility] ICD-10-CM: N18.31  ICD-9-CM: 527. 3  Unknown - Present        UTI (urinary tract infection) ICD-10-CM: N39.0  ICD-9-CM: 599.0  3/8/2022 - Present        Coffee ground emesis ICD-10-CM: K92.0  ICD-9-CM: 578.0  2/9/2022 - Present        COVID-19 ICD-10-CM: U07.1  ICD-9-CM: 079.89  2/9/2022 - Present        DKA (diabetic ketoacidosis) ([facility] ICD-10-CM: E11.10  ICD-9-CM: 250.12  12/2/2021 - Present        Hyperkalemia ICD-10-CM: E87.5  ICD-9-CM: 276.7  9/23/2021 - Present        Hypothermia ICD-10-CM: T68. Nora Ropes  ICD-9-CM: 991.6  3/7/2020 - Present        Acidosis, metabolic [id number]-[id number]: V33.6  ICD-9-CM: 276.2  3/7/2020 - Present        DKA, type 1, not at goal [facility]) ICD-10-CM: E10.10  ICD-9-CM: 250.13  3/7/2020 - Present        FELECIA (acute kidney injury) ([facility] ICD-10-CM: N17.9  ICD-9-CM: 584.9  1/2/2020 - Present        Hydronephrosis of right kidney ICD-10-CM: N13.30  ICD-9-CM: 494  10/29/2019 - Present        DKA (diabetic ketoacidoses) ICD-10-CM: E11.10  ICD-9-CM: 250.12  7/6/2019 - Present        DKA, type 1 ([facility] ICD-10-CM: E10.10  ICD-9-CM: 250.13  11/18/2018 - Present        Neuropathy ICD-10-CM: G62.9  ICD-9-CM: 355.9  11/18/2018 - Present        Upper GI bleed ICD-10-CM: K92.2  ICD-9-CM: 578.9  11/18/2018 - Present        Diabetic neuropathy, type I diabetes mellitus ([facility] ICD-10-CM: E10.40  ICD-9-CM: 250.61, 357.2  2/2/2014 - Present        Tobacco abuse (Chronic) ICD-10-CM: Z72.0  ICD-9-CM: 305.1 Chronic 8/18/2011 - Present        HTN (hypertension) ICD-10-CM: I10  ICD-9-CM: 401.9  3/13/2011 - Present        RESOLVED: Nausea with vomiting ICD-10-CM: R11.2  ICD-9-CM: 787.01  4/16/2019 - 11/8/2019        RESOLVED: DKA, type 1, not at goal [facility]) ICD-10-CM: E10.10  ICD-9-CM: 250.13  4/14/2019 - 11/8/2019        RESOLVED: Cellulitis of left foot ICD-10-CM: L03.116  ICD-9-CM: 682.7  3/11/2018 - 9/2/2018        RESOLVED: Pancreatitis, acute ICD-10-CM: K85.90  ICD-9-CM: 577.0  9/27/2016 - 9/2/2018        RESOLVED: Tachycardia ICD-10-CM: R00.0  ICD-9-CM: 785.0  2/2/2014 - 11/8/2019        RESOLVED: DKA (diabetic ketoacidoses) ICD-10-CM: E11.10  ICD-9-CM: 250.12  7/4/2013 - 1/29/2014        RESOLVED: SIRS (systemic inflammatory response syndrome) ([facility] ICD-10-CM: R65.10  ICD-9-CM: 995.90  7/4/2013 - 9/2/2018        RESOLVED: Hyperkalemia ICD-10-CM: E87.5  ICD-9-CM: 276.7  3/8/2013 - 3/10/2013        RESOLVED: DKA (diabetic ketoacidoses) ICD-10-CM: E11.10  ICD-9-CM: 250.12  12/27/2012 - 12/30/2012        RESOLVED: SIRS (systemic inflammatory response syndrome) ([facility] ICD-10-CM: R65.10  ICD-9-CM: 995.90  10/29/2012 - 3/10/2013        RESOLVED: Pharyngitis ICD-10-CM: J02.9  ICD-9-CM: 462  10/28/2012 - 3/10/2013        RESOLVED: LFT elevation ICD-10-CM: R79.89  ICD-9-CM: 790.6  10/28/2012 - 3/10/2013        RESOLVED: DKA (diabetic ketoacidoses) ICD-10-CM: E11.10  ICD-9-CM: 250.12  9/7/2012 - 3/10/2013        RESOLVED: DM (diabetes mellitus), type 1, uncontrolled ICD-10-CM: KQL8792  ICD-9-CM: 250.03  3/13/2011 - 1/29/2014        RESOLVED: Cellulitis and abscess of foot, except toes ICD-10-CM: L03.119, L02.619  ICD-9-CM: 682.7  3/13/2011 - 9/2/2018          22    Final Diagnoses:    Active Problems:    FELECIA (acute kidney injury) ([facility] (1/2/2020)      Hyperglycemia due to diabetes mellitus ([facility] (7/10/2022)      Intractable nausea and vomiting (7/10/2022)        Reason for Hospitalization:  Nausea/vomiting/abdominal pain    Hospital Course:   Pt admitted for nausea/vomiting with associated abdominal pain, CT abdomen/pelvis showing possible esophagitis, and distal esophageal mucosal thickening. He underwent an EGD which showed severe erosive esophagitis and gastric erosions. Biopsies were sent and pt will continue with PPI twice daily and carafate as prescribed for 14 days. Pt was seen by nephrology for CKD stage 4, creatinine above his baseline, was given IV fluids during hospital stay. Pt was cleared by nephrology for discharge with outpatient follow up. Pt instructed to avoid NSAIDS, and aspirin products. Pt to continue with current home medications and follow up with PCP in 2 weeks. Pt is stable for discharge home today. Discharge Medications:   Current Discharge Medication List      START taking these medications    Details   acetaminophen (TYLENOL) 325 mg tablet Take 2 Tablets by mouth every four (4) hours as needed for Pain or Fever for up to 30 days. Qty: 180 Tablet, Refills: 0  Start date: 7/12/2022, End date: 8/11/2022      sucralfate (CARAFATE) 1 gram tablet Take 1 Tablet by mouth Before breakfast, lunch, dinner and at bedtime for 14 days. Indications: reflux esophagitis, or inflammation of the esophagus from backflow of stomach acid  Qty: 56 Tablet, Refills: 0  Start date: 7/12/2022, End date: 7/26/2022      ferrous sulfate 325 mg (65 mg iron) tablet Take 1 Tablet by mouth daily (with breakfast) for 30 days. Qty: 30 Tablet, Refills: 0  Start date: 7/12/2022, End date: 8/11/2022         CONTINUE these medications which have CHANGED    Details   ondansetron (ZOFRAN ODT) 4 mg disintegrating tablet Take 1 Tablet by mouth every eight (8) hours as needed for Nausea or Vomiting for up to 30 days. Qty: 90 Tablet, Refills: 0  Start date: 7/12/2022, End date: 8/11/2022      pantoprazole (PROTONIX) 40 mg tablet Take 1 Tablet by mouth Before breakfast and dinner for 30 days. Indications: inflammation of the esophagus with erosion  Qty: 60 Tablet, Refills: 0  Start date: 7/12/2022, End date: 8/11/2022         CONTINUE these medications which have NOT CHANGED    Details   Dexcom G6  misc Use with the dexcom device to check blood sugars 4 times a day  Qty: 1 Each, Refills: 0    Comments: [phone] dx code E10.65      DULoxetine (CYMBALTA) 60 mg capsule Take 1 capsule by mouth once daily  Qty: 90 Capsule, Refills: 0    Associated Diagnoses: Other diabetic neurological complication associated with type 1 diabetes mellitus ([facility]      Dexcom G6 Sensor brandi Use as directed every 10 days  Qty: 10 Each, Refills: 11    Comments: [phone] dx code E10.65      Dexcom G6 Transmitter brandi Use as directed  Qty: 10 Each, Refills: 3    Comments: [phone] dx code E10.65      FreeStyle Connor 14 Day Sensor kit Use as directed every 14 days  Qty: 2 Kit, Refills: 2      Insulin Needles, Disposable, 31 gauge x 5/16\" ndle Dx code E11.65, use 6x daily  Qty: 200 Each, Refills: 11      insulin glargine (LANTUS) 100 unit/mL injection 16 Units by SubCUTAneous route daily. Qty: 1 mL, Refills: 0      insulin aspart U-100 (NovoLOG Flexpen U-100 Insulin) 100 unit/mL (3 mL) inpn Take 1 unit for every 15 grams of carbohydrates, 1 unit for every 50 points >150. Max daily dose 25U. Qty: 15 mL, Refills: 3      rosuvastatin (CRESTOR) 20 mg tablet Take 1 Tablet by mouth nightly. Qty: 90 Tablet, Refills: 0    Associated Diagnoses: Hypercholesteremia      amLODIPine (NORVASC) 5 mg tablet Take 1 Tablet by mouth daily. Qty: 30 Tablet, Refills: 1      carvediloL (COREG) 12.5 mg tablet Take 1 Tablet by mouth two (2) times daily (with meals). Qty: 60 Tablet, Refills: 1      tamsulosin (FLOMAX) 0.4 mg capsule Take 0.4 mg by mouth daily. pen needle, diabetic 30 gauge x 3/16\" ndle 5 Units by Does Not Apply route Before breakfast, lunch, and dinner.   Qty: 100 Each, Refills: 3    Associated Diagnoses: DM type 2, goal HbA1c < 7% (HCC)      finasteride (PROSCAR) 5 mg tablet Take 1 Tablet by mouth daily. Qty: 30 Tablet, Refills: 2      ergocalciferol (ERGOCALCIFEROL) 1,250 mcg (50,000 unit) capsule Take 1 capsule by mouth once a week  Qty: 12 Capsule, Refills: 0    Associated Diagnoses: Vitamin D deficiency      pregabalin (Lyrica) 75 mg capsule Take 75 mg by mouth two (2) times a day. STOP taking these medications       mupirocin (BACTROBAN) 2 % ointment Comments:   Reason for Stopping:         naloxone (Narcan) 4 mg/actuation nasal spray Comments:   Reason for Stopping: Follow up Care:    1. So Medina MD in 2 weeks. Follow-up Information     Follow up With Specialties Details Why Cam Casas MD Internal Medicine Physician In 2 weeks  [address] [facility]  [address]      Brandi Johnson MD Gastroenterology In 2 weeks follow up after hospital admission for esophagitis/gastritis [address] [facility]      Leslie Christian MD Nephrology In 4 weeks follow up as new patient referral for CKD [id number] [facility]  [phone]              * Follow-up Care/Patient Instructions: Activity: Activity as tolerated  Diet: Regular Diet  Wound Care: None needed      Condition at Discharge:  Stable  __________________________________________________________________    Disposition  Home or Self Care  ____________________________________________________________________    Code Status:  Full Code  ___________________________________________________________________    Discharge Exam:  Patient seen and examined by me on discharge day. Physical Exam  Constitutional:       General: He is not in acute distress. Cardiovascular:      Rate and Rhythm: Normal rate and regular rhythm. Pulmonary:      Breath sounds: Normal breath sounds.    Abdominal:      Palpations: Abdomen is soft. Musculoskeletal:         General: Normal range of motion. Skin:     General: Skin is warm and dry. Neurological:      Mental Status: He is oriented to person, place, and time.           CONSULTATIONS: GI, Nephrology    Significant Diagnostic Studies:   Recent Results (from the past 24 hour(s))   GLUCOSE, POC    Collection Time: 07/11/22 11:29 AM   Result Value Ref Range    Glucose (POC) 141 (H) 65 - 117 mg/dL    Performed by [facility] RN    GLUCOSE, POC    Collection Time: 07/11/22 12:33 PM   Result Value Ref Range    Glucose (POC) 125 (H) 65 - 117 mg/dL    Performed by Jose Ramon Liao RN    GLUCOSE, POC    Collection Time: 07/11/22  4:08 PM   Result Value Ref Range    Glucose (POC) 90 65 - 117 mg/dL    Performed by Olive Irving PCT    GLUCOSE, POC    Collection Time: 07/11/22  8:43 PM   Result Value Ref Range    Glucose (POC) 59 (L) 65 - 117 mg/dL    Performed by Sean Rojo (PCT)    GLUCOSE, POC    Collection Time: 07/11/22  9:23 PM   Result Value Ref Range    Glucose (POC) 174 (H) 65 - 117 mg/dL    Performed by Sean Rojo (PCT)    RENAL FUNCTION PANEL    Collection Time: 07/12/22  4:06 AM   Result Value Ref Range    Sodium 136 136 - 145 mmol/L    Potassium 3.6 3.5 - 5.1 mmol/L    Chloride 107 97 - 108 mmol/L    CO2 23 21 - 32 mmol/L    Anion gap 6 5 - 15 mmol/L    Glucose 57 (L) 65 - 100 mg/dL    BUN 40 (H) 6 - 20 MG/DL    Creatinine 3.48 (H) 0.70 - 1.30 MG/DL    BUN/Creatinine ratio 11 (L) 12 - 20      GFR est AA 24 (L) >60 ml/min/1.73m2    GFR est non-AA 20 (L) >60 ml/min/1.73m2    Calcium 8.2 (L) 8.5 - 10.1 MG/DL    Phosphorus 4.8 (H) 2.6 - 4.7 MG/DL    Albumin 1.5 (L) 3.5 - 5.0 g/dL   CBC W/O DIFF    Collection Time: 07/12/22  4:06 AM   Result Value Ref Range    WBC 8.1 4.1 - 11.1 K/uL    RBC 3.07 (L) 4.10 - 5.70 M/uL    HGB 8.9 (L) 12.1 - 17.0 g/dL    HCT 26.6 (L) 36.6 - 50.3 %    MCV 86.6 80.0 - 99.0 FL    MCH 29.0 26.0 - 34.0 PG    MCHC 33.5 30.0 - 36.5 g/dL    RDW 15.7 (H) 11.5 - 14.5 %    PLATELET 518 (H) 099 - 400 K/uL    MPV 9.6 8.9 - 12.9 FL    NRBC 0.0 0  WBC    ABSOLUTE NRBC 0.00 0.00 - 0.01 K/uL   PROTEIN/CREATININE RATIO, URINE    Collection Time: 07/12/22  4:09 AM   Result Value Ref Range    Protein, urine random 607 (H) 0.0 - 11.9 mg/dL    Creatinine, urine 55.40 mg/dL    Protein/Creat. urine Ratio 11.0     GLUCOSE, POC    Collection Time: 07/12/22  6:09 AM   Result Value Ref Range    Glucose (POC) 106 65 - 117 mg/dL    Performed by Sabrina Simmons (PCT)      CT ABD PELV WO CONT   Final Result      1. Distal esophageal mucosal thickening, suspicious for esophagitis. 2. Severely distended bladder without bladder wall emphysema or focal   thickening. 3. Trace bilateral pleural effusions. Discharge: time spent 35 minutes in discharge  Education and counseling.      Signed:  Peter Bo NP  7/12/2022  7:33 AM

## 2022-07-12 NOTE — PROGRESS NOTES
Dr Mariann Dawkins made aware via perfect serve of pt's FSBS 61 and pt treated with D10 infusion, no new orders received.

## 2022-07-12 NOTE — PROGRESS NOTES
Gastroenterology Daily Progress Note MANUEL Perez   for Dr. Deven Gannon)   [facility]    Admit Date: 7/10/2022     F/u of intractable N/V    Subjective:       EGD 7/11/22: Findings:  Esophagus: severe grade D erosive esophagitis from 25cm to EGJ at 41cm. The more distal 35-41cm with spontaneous oozing and contact friability and spasms. Multiple cold forceps biopsies were obtained of distal esophagus ulcerations with minimal bleeding to r/o CMV, HSV, dysplasia, malignancy. Stomach: multiple gastric erosions <2mm in size in antrum, no stigmata of bleeding. Cold forceps biopsies were obtained antrum, incisura, body, cardia to r/o H pylori. Duodenum/jejunum:normal    He is tolerating clear liquids but has a GI bland diet ordered   He has discharge orders  No further N/V or hematemesis  Results for Roe Wright (MRN [id number]) as of 7/12/2022 07:50   Ref.  Range 7/11/2022 03:10 7/12/2022 04:06   HGB Latest Ref Range: 12.1 - 17.0 g/dL 10.7 (L) 8.9 (L)   HCT Latest Ref Range: 36.6 - 50.3 % 31.8 (L) 26.6 (L)       Current Facility-Administered Medications   Medication Dose Route Frequency    ferrous sulfate tablet 325 mg  1 Tablet Oral DAILY WITH BREAKFAST    pantoprazole (PROTONIX) tablet 40 mg  40 mg Oral ACB&D    insulin lispro (HUMALOG) injection 8 Units  8 Units SubCUTAneous TIDAC    insulin lispro (HUMALOG) injection   SubCUTAneous AC&HS    HYDROcodone-acetaminophen (NORCO) 5-325 mg per tablet 1 Tablet  1 Tablet Oral Q4H PRN    polyethylene glycol (MIRALAX) packet 17 g  17 g Oral DAILY    melatonin tablet 3 mg  3 mg Oral QHS PRN    hydrOXYzine HCL (ATARAX) tablet 25 mg  25 mg Oral TID PRN    diphenhydrAMINE (BENADRYL) injection 25 mg  25 mg IntraVENous Q6H PRN    sucralfate (CARAFATE) tablet 1 g  1 g Oral AC&HS    hydrALAZINE (APRESOLINE) 20 mg/mL injection 10 mg  10 mg IntraVENous Q6H PRN    amLODIPine (NORVASC) tablet 5 mg  5 mg Oral DAILY    carvediloL (COREG) tablet 12.5 mg  12.5 mg Oral BID WITH MEALS    DULoxetine (CYMBALTA) capsule 60 mg  60 mg Oral DAILY    finasteride (PROSCAR) tablet 5 mg  5 mg Oral DAILY    insulin glargine (LANTUS) injection 16 Units  16 Units SubCUTAneous DAILY    mupirocin (BACTROBAN) 2 % ointment   Topical DAILY    pregabalin (LYRICA) capsule 75 mg  75 mg Oral BID    rosuvastatin (CRESTOR) tablet 20 mg  20 mg Oral QHS    tamsulosin (FLOMAX) capsule 0.4 mg  0.4 mg Oral DAILY    glucose chewable tablet 16 g  4 Tablet Oral PRN    glucagon (GLUCAGEN) injection 1 mg  1 mg IntraMUSCular PRN    dextrose 10 % infusion 0-250 mL  0-250 mL IntraVENous PRN    sodium chloride (NS) flush 5-40 mL  5-40 mL IntraVENous Q8H    sodium chloride (NS) flush 5-40 mL  5-40 mL IntraVENous PRN    acetaminophen (TYLENOL) tablet 650 mg  650 mg Oral Q6H PRN    Or    acetaminophen (TYLENOL) suppository 650 mg  650 mg Rectal Q6H PRN    ondansetron (ZOFRAN ODT) tablet 4 mg  4 mg Oral Q8H PRN    Or    ondansetron (ZOFRAN) injection 4 mg  4 mg IntraVENous Q6H PRN    heparin (porcine) injection 5,000 Units  5,000 Units SubCUTAneous Q8H    aluminum-magnesium hydroxide (MAALOX) oral suspension 15 mL  15 mL Oral QID PRN        Objective:     Visit Vitals  BP (!) 175/99   Pulse 72   Temp 97.5 °F (36.4 °C)   Resp 18   Ht 5' 9\" (1.753 m)   Wt 63.5 kg (140 lb)   SpO2 97%   BMI 20.67 kg/m²   Blood pressure (!) 175/99, pulse 72, temperature 97.5 °F (36.4 °C), resp. rate 18, height 5' 9\" (1.753 m), weight 63.5 kg (140 lb), SpO2 97 %. No intake/output data recorded. 07/10 1901 - 07/12 0700  In: 5 [P.O.:220;  I.V.:200]  Out: 940 [Urine:940]      Intake/Output Summary (Last 24 hours) at 7/12/2022 0756  Last data filed at 7/12/2022 0410  Gross per 24 hour   Intake 420 ml   Output 300 ml   Net 120 ml         Physical Exam:       General: WM in NAD      Labs:       Recent Results (from the past 24 hour(s))   GLUCOSE, POC    Collection Time: 07/11/22 11:29 AM Result Value Ref Range    Glucose (POC) 141 (H) 65 - 117 mg/dL    Performed by [facility] RN    GLUCOSE, POC    Collection Time: 07/11/22 12:33 PM   Result Value Ref Range    Glucose (POC) 125 (H) 65 - 117 mg/dL    Performed by Maninder Katz RN    GLUCOSE, POC    Collection Time: 07/11/22  4:08 PM   Result Value Ref Range    Glucose (POC) 90 65 - 117 mg/dL    Performed by Lien Yoon PCT    GLUCOSE, POC    Collection Time: 07/11/22  8:43 PM   Result Value Ref Range    Glucose (POC) 59 (L) 65 - 117 mg/dL    Performed by Desiree Ramos (PCT)    GLUCOSE, POC    Collection Time: 07/11/22  9:23 PM   Result Value Ref Range    Glucose (POC) 174 (H) 65 - 117 mg/dL    Performed by Desiree Ramos (PCT)    RENAL FUNCTION PANEL    Collection Time: 07/12/22  4:06 AM   Result Value Ref Range    Sodium 136 136 - 145 mmol/L    Potassium 3.6 3.5 - 5.1 mmol/L    Chloride 107 97 - 108 mmol/L    CO2 23 21 - 32 mmol/L    Anion gap 6 5 - 15 mmol/L    Glucose 57 (L) 65 - 100 mg/dL    BUN 40 (H) 6 - 20 MG/DL    Creatinine 3.48 (H) 0.70 - 1.30 MG/DL    BUN/Creatinine ratio 11 (L) 12 - 20      GFR est AA 24 (L) >60 ml/min/1.73m2    GFR est non-AA 20 (L) >60 ml/min/1.73m2    Calcium 8.2 (L) 8.5 - 10.1 MG/DL    Phosphorus 4.8 (H) 2.6 - 4.7 MG/DL    Albumin 1.5 (L) 3.5 - 5.0 g/dL   CBC W/O DIFF    Collection Time: 07/12/22  4:06 AM   Result Value Ref Range    WBC 8.1 4.1 - 11.1 K/uL    RBC 3.07 (L) 4.10 - 5.70 M/uL    HGB 8.9 (L) 12.1 - 17.0 g/dL    HCT 26.6 (L) 36.6 - 50.3 %    MCV 86.6 80.0 - 99.0 FL    MCH 29.0 26.0 - 34.0 PG    MCHC 33.5 30.0 - 36.5 g/dL    RDW 15.7 (H) 11.5 - 14.5 %    PLATELET 454 (H) 595 - 400 K/uL    MPV 9.6 8.9 - 12.9 FL    NRBC 0.0 0  WBC    ABSOLUTE NRBC 0.00 0.00 - 0.01 K/uL   PROTEIN/CREATININE RATIO, URINE    Collection Time: 07/12/22  4:09 AM   Result Value Ref Range    Protein, urine random 607 (H) 0.0 - 11.9 mg/dL    Creatinine, urine 55.40 mg/dL    Protein/Creat.  urine Ratio 11.0     GLUCOSE, POC Collection Time: 07/12/22  6:09 AM   Result Value Ref Range    Glucose (POC) 106 65 - 117 mg/dL    Performed by Shaye Encarnacion (PCT)    GLUCOSE, POC    Collection Time: 07/12/22  7:40 AM   Result Value Ref Range    Glucose (POC) 101 65 - 117 mg/dL    Performed by Mita Palumbo (PCT)    LABRCNT(wbc:2,hgb:2,hct:2,plt:2,)  Recent Labs     07/12/22  0406 07/11/22  0310 07/10/22  1445    134* 132*   K 3.6 3.9 3.8    103 102   CO2 23 19* 17*   BUN 40* 46* 43*   CREA 3.48* 3.71* 3.44*   GLU 57* 303* 465*   CA 8.2* 8.9 8.9   MG  --  2.6*  --    PHOS 4.8*  --   --    LABRCNT(sgot:3,gpt:3,ap:3,tbiL:3,TP:3,ALB:3,GLOB:3,ggt:3,aml:3,amyp:3,lpse:3,hlpse:3)No results for input(s): INR, PTP, APTT, INREXT in the last 72 hours. Recent Labs     07/12/22  0406 07/10/22  1445   AP  --  149*   TP  --  5.7*   ALB 1.5* 1.5*   GLOB  --  4.2*   LPSE  --  56*   BRIEFLAB(B12,FOL,FOLAT,RBCF)No results found for: FOL, RBCFLABRCNT(CPK:3,CpKMB:3,ckndx:3,troiq:3)No components found for: GLPOCBRIEFLAB(CHOL,CHOLX,CHOLP,CHLST,CHOLV,HDL,HDLC,HDLP,LDL,DLDL,LDLC,DLDLP,TGL,TGLX,TRIGL,TRIGP,CHHD,CHHDX)No results for input(s): PH, PCO2, PO2 in the last 72 hours. LABRCNT(CPK:3,CpKMB:3,ckndx:3,troiq:3)MANUEL Stanford  No results for input(s): CPK, CKNDX, TROIQ in the last 72 hours. No lab exists for component: MANUEL Jackson      Problem List:     Active Problems:    FELECIA (acute kidney injury) ([facility] (1/2/2020)      Hyperglycemia due to diabetes mellitus ([facility] (7/10/2022)      Intractable nausea and vomiting (7/10/2022)        Impression:  Grade D esophagitis  Gastric erosions  Acute blood loss anemia  N/V       Plan:  He is being discharged home. I told him we would call him with his bx results. He should take pantoprazole 2x/day and sucralfate 4x/day. He will need repeat EGD but we don't accept his insurance so I advised him to call [facility] to establish follow up.          MANUEL Morley    7/12/2022  [facility] [address], [address] [place] [address]  [phone]  [address] [place] [address] [place]: [phone]

## 2022-07-12 NOTE — PROGRESS NOTES
Bedside shift change report given to ASAD Mike (oncoming nurse) by Fabio Lazaro RN (offgoing nurse). Report included the following information SBAR, Kardex, Intake/Output, MAR and Cardiac Rhythm sinus rhythm.

## 2022-07-12 NOTE — PROGRESS NOTES
Nephrology Progress Note  Merry Myrick  Date of Admission : 7/10/2022    CC:  Follow up for FELECIA on CKD       Assessment and Plan     FELECIA on CKD:  - suspect 2/2 volume depletion from n/v  - improving  - ok for d/c  - will need f/u with us in 2 weeks post d/c    CKD 4:  - baseline Cr 2 to 2.5  - likely 2/2 DM and HTN     N/V:  - EGD showing gastritis and esophagitis  - per GI/primary team     Hypovolemia:  - resolving     Hx of urinary retention:  - cont flomax and proscar     Type 1 DM:  - per primary team     Hx of bipolar d/o       Interval History:  Seen and examined. Cr slightly better. Voiding ok on his own. No cp, sob, n/v/d.  GI symptoms resolved. Current Medications: all current  Medications have been eviewed in EPIC  Review of Systems: Pertinent items are noted in HPI. Objective:  Vitals:    Vitals:    07/11/22 2346 07/12/22 0359 07/12/22 0410 07/12/22 0748   BP: (!) 155/87 (!) 157/78  (!) 175/99   Pulse: 78 76 70 72   Resp: 18 18 18 18   Temp: 98.1 °F (36.7 °C) 97.8 °F (36.6 °C)  97.5 °F (36.4 °C)   SpO2: 99% 97%  97%   Weight:       Height:         Intake and Output:  07/12 0701 - 07/12 1900  In: -   Out: 950 [Urine:950]  07/10 1901 - 07/12 0700  In: 420 [P.O.:220; I.V.:200]  Out: 940 [Urine:940]    Physical Examination:    General: NAD,Conversant   Neck:  Supple, no mass  Resp:  Lungs CTA B/L, no wheezing , normal respiratory effort  CV:  RRR,  no murmur or rub, no LE edema  GI:  Soft, NT, + Bowel sounds, no hepatosplenomegaly  Neurologic:  Non focal  Psych:             AAO x 3 appropriate affect   Skin:  No Rash  :  No hansen    []    High complexity decision making was performed  []    Patient is at high-risk of decompensation with multiple organ involvement    Lab Data Personally Reviewed: I have reviewed all the pertinent labs, microbiology data and radiology studies during assessment.     Recent Labs     07/12/22  0406 07/11/22  0310 07/10/22  1445    134* 132*   K 3.6 3.9 3.8  103 102   CO2 23 19* 17*   GLU 57* 303* 465*   BUN 40* 46* 43*   CREA 3.48* 3.71* 3.44*   CA 8.2* 8.9 8.9   MG  --  2.6*  --    PHOS 4.8*  --   --    ALB 1.5*  --  1.5*   ALT  --   --  28     Recent Labs     07/12/22  0406 07/11/22  0310 07/10/22  1445   WBC 8.1 13.4* 11.4*   HGB 8.9* 10.7* 10.8*   HCT 26.6* 31.8* 31.7*   * 597* 508*     Lab Results   Component Value Date/Time    Specimen Description: NASAL 04/30/2014 03:30 AM    Specimen Description: NARES 01/28/2014 08:30 PM    Specimen Description: NARES 07/04/2013 11:00 PM     Lab Results   Component Value Date/Time    Culture result: No growth (<1,000 CFU/ML) 07/10/2022 01:48 PM    Culture result: No growth (<1,000 CFU/ML) 06/25/2022 08:50 AM    Culture result: NO GROWTH 5 DAYS 06/07/2022 11:40 PM     Recent Results (from the past 24 hour(s))   GLUCOSE, POC    Collection Time: 07/11/22 11:29 AM   Result Value Ref Range    Glucose (POC) 141 (H) 65 - 117 mg/dL    Performed by [facility] RN    GLUCOSE, POC    Collection Time: 07/11/22 12:33 PM   Result Value Ref Range    Glucose (POC) 125 (H) 65 - 117 mg/dL    Performed by Johanne Jaeger RN    GLUCOSE, POC    Collection Time: 07/11/22  4:08 PM   Result Value Ref Range    Glucose (POC) 90 65 - 117 mg/dL    Performed by Heriberto Mendoza PCT    GLUCOSE, POC    Collection Time: 07/11/22  8:43 PM   Result Value Ref Range    Glucose (POC) 59 (L) 65 - 117 mg/dL    Performed by Iris Corrales (PCT)    GLUCOSE, POC    Collection Time: 07/11/22  9:23 PM   Result Value Ref Range    Glucose (POC) 174 (H) 65 - 117 mg/dL    Performed by Iris Corrales (PCT)    RENAL FUNCTION PANEL    Collection Time: 07/12/22  4:06 AM   Result Value Ref Range    Sodium 136 136 - 145 mmol/L    Potassium 3.6 3.5 - 5.1 mmol/L    Chloride 107 97 - 108 mmol/L    CO2 23 21 - 32 mmol/L    Anion gap 6 5 - 15 mmol/L    Glucose 57 (L) 65 - 100 mg/dL    BUN 40 (H) 6 - 20 MG/DL    Creatinine 3.48 (H) 0.70 - 1.30 MG/DL    BUN/Creatinine ratio 11 (L) 12 - 20      GFR est AA 24 (L) >60 ml/min/1.73m2    GFR est non-AA 20 (L) >60 ml/min/1.73m2    Calcium 8.2 (L) 8.5 - 10.1 MG/DL    Phosphorus 4.8 (H) 2.6 - 4.7 MG/DL    Albumin 1.5 (L) 3.5 - 5.0 g/dL   CBC W/O DIFF    Collection Time: 07/12/22  4:06 AM   Result Value Ref Range    WBC 8.1 4.1 - 11.1 K/uL    RBC 3.07 (L) 4.10 - 5.70 M/uL    HGB 8.9 (L) 12.1 - 17.0 g/dL    HCT 26.6 (L) 36.6 - 50.3 %    MCV 86.6 80.0 - 99.0 FL    MCH 29.0 26.0 - 34.0 PG    MCHC 33.5 30.0 - 36.5 g/dL    RDW 15.7 (H) 11.5 - 14.5 %    PLATELET 809 (H) 349 - 400 K/uL    MPV 9.6 8.9 - 12.9 FL    NRBC 0.0 0  WBC    ABSOLUTE NRBC 0.00 0.00 - 0.01 K/uL   PROTEIN/CREATININE RATIO, URINE    Collection Time: 07/12/22  4:09 AM   Result Value Ref Range    Protein, urine random 607 (H) 0.0 - 11.9 mg/dL    Creatinine, urine 55.40 mg/dL    Protein/Creat. urine Ratio 11.0     GLUCOSE, POC    Collection Time: 07/12/22  6:09 AM   Result Value Ref Range    Glucose (POC) 106 65 - 117 mg/dL    Performed by Sonal Casas (PCT)    GLUCOSE, POC    Collection Time: 07/12/22  7:40 AM   Result Value Ref Range    Glucose (POC) 101 65 - 117 mg/dL    Performed by Concha Jc (PCT)                  Crystal Maldonado MD  [address]  Phone - [phone]   Fax - [phone]  [email or web address]

## 2022-07-12 NOTE — PROGRESS NOTES
Pt is cleared for d/c from a CM standpoint. Transition of Care Plan:    RUR: 33% high/OBS  Disposition:home with mother  Follow up appointments:PCP, Specialists  DME needed:none  Transportation at [place] [address] or means to access home:   yes     IM Medicare Letter: n/a  Is patient a BCPI-A Bundle:   n/a        If yes, was Bundle Letter given?:    Is patient a [place] and connected with the [place]?   n/a             If yes, was [employer] transfer form completed and [facility] notified? Caregiver Contact:  Rolle Res  [phone]  Discharge Caregiver contacted prior to discharge? CM will contact prior to d/c if pt desires. Care Conference needed?:          Reason for Admission:  FELECIA; Hyperglycemia d/t DM; Intractable N/V                     RUR Score:    OBS                 Plan for utilizing home health:    As recommended      PCP: First and Last name:  Ruperto Dee MD     Name of Practice:    Are you a current patient: Yes/No: yes   Approximate date of last visit: a month ago    Can you participate in a virtual visit with your PCP:                     Current Advanced Directive/Advance Care Plan: Full Code    Advance Care Planning     General Advance Care Planning (ACP) Conversation      Date of Conversation:7/12/22  Conducted with: Patient with Decision Making Capacity    Healthcare Decision Maker:     Primary Decision Maker: Jaquelin Calvert - Mother - [phone]  Click here to complete [address] including selection of the Healthcare Decision Maker Relationship (ie \"Primary\")        Content/Action Overview:   DECLINED ACP conversation - will revisit periodically   Reviewed DNR/DNI and patient elects Full Code (Attempt Resuscitation)    Length of Voluntary ACP Conversation in minutes:  <16 minutes (Non-Billable)    Aylin King                        Primary Decision Maker: Jaquelin Calvert - Mother - [phone]                  Transition of Care Plan:     Home with family    9:19 a.m. CM scheduled f/u appts. CM met with pt at bedside to introduce self/role, verify demographics, insurance and PCP. CM also discussed d/c plan. Pt is a 45 yo, single, male who is under O[facility] at [facility] for his above dxs. Pt sees his PCP as needed. Pt uses the [facility] in [place]. Pt lives with his mother in a one floor home with a ramp. Pt has a supportive Uncle. Pt needs some asst with his ADL care. Pt does drive. Pt denied any DME use. Pt denied a hx of HH, SNF or IPR. Pt's mother is here and ready to transport hime home. CM will continue to assess for d/c needs. Care Management Interventions  PCP Verified by CM: Yes (Pt sees Dr. Ed Carlton. )  Mode of Transport at Discharge:  Other (see comment) (mother)  Transition of Care Consult (CM Consult): Discharge Planning  Discharge Durable Medical Equipment: No  Physical Therapy Consult: No  Occupational Therapy Consult: No  Speech Therapy Consult: No  Support Systems: Parent(s)  Confirm Follow Up Transport: Self  The Patient and/or Patient Representative was Provided with a Choice of Provider and Agrees with the Discharge Plan?: Yes  Freedom of Choice List was Provided with Basic Dialogue that Supports the Patient's Individualized Plan of Care/Goals, Treatment Preferences and Shares the Quality Data Associated with the Providers?: Yes  Discharge Location  Patient Expects to be Discharged to[de-identified] Home with family assistance    KAVEH Lee  Care Management, [address]

## 2022-07-12 NOTE — PROGRESS NOTES
Hospital follow-up PCP transitional care appointment has been scheduled with Dr. Katheran Saint. Deshmukh on 7/14/22 at 1100. This is a previously scheduled appt. Pending patient discharge.   Colton Gutiérrez Care Management Assistant

## 2022-07-14 ENCOUNTER — OFFICE VISIT (OUTPATIENT)
Dept: PRIMARY CARE CLINIC | Age: 42
End: 2022-07-14
Payer: MEDICAID

## 2022-07-14 VITALS
WEIGHT: 163 LBS | DIASTOLIC BLOOD PRESSURE: 98 MMHG | BODY MASS INDEX: 24.14 KG/M2 | RESPIRATION RATE: 18 BRPM | HEIGHT: 69 IN | OXYGEN SATURATION: 98 % | HEART RATE: 76 BPM | TEMPERATURE: 97.1 F | SYSTOLIC BLOOD PRESSURE: 172 MMHG

## 2022-07-14 DIAGNOSIS — I10 PRIMARY HYPERTENSION: ICD-10-CM

## 2022-07-14 DIAGNOSIS — R29.6 RECURRENT FALLS: ICD-10-CM

## 2022-07-14 DIAGNOSIS — E11.319 DIABETIC RETINOPATHY ASSOCIATED WITH TYPE 2 DIABETES MELLITUS, MACULAR EDEMA PRESENCE UNSPECIFIED, UNSPECIFIED LATERALITY, UNSPECIFIED RETINOPATHY SEVERITY (HCC): ICD-10-CM

## 2022-07-14 DIAGNOSIS — E78.00 HYPERCHOLESTEREMIA: ICD-10-CM

## 2022-07-14 DIAGNOSIS — E11.49 OTHER DIABETIC NEUROLOGICAL COMPLICATION ASSOCIATED WITH TYPE 2 DIABETES MELLITUS (HCC): ICD-10-CM

## 2022-07-14 DIAGNOSIS — K20.90 ESOPHAGITIS: ICD-10-CM

## 2022-07-14 DIAGNOSIS — E11.9 DM TYPE 2, GOAL HBA1C < 7% (HCC): Primary | ICD-10-CM

## 2022-07-14 DIAGNOSIS — K25.9 GASTRIC ULCER WITHOUT HEMORRHAGE OR PERFORATION, UNSPECIFIED CHRONICITY: ICD-10-CM

## 2022-07-14 DIAGNOSIS — N18.9 CHRONIC KIDNEY DISEASE, UNSPECIFIED CKD STAGE: ICD-10-CM

## 2022-07-14 PROCEDURE — 99215 OFFICE O/P EST HI 40 MIN: CPT | Performed by: INTERNAL MEDICINE

## 2022-07-14 RX ORDER — ROSUVASTATIN CALCIUM 40 MG/1
TABLET, COATED ORAL
Status: ON HOLD | COMMUNITY
Start: 2022-06-21 | End: 2022-09-12 | Stop reason: SDUPTHER

## 2022-07-14 RX ORDER — INSULIN ASPART 100 [IU]/ML
INJECTION, SOLUTION INTRAVENOUS; SUBCUTANEOUS
Qty: 30 ML | Refills: 2 | Status: SHIPPED | COMMUNITY
Start: 2022-07-14 | End: 2022-07-15 | Stop reason: SDUPTHER

## 2022-07-14 RX ORDER — CLONIDINE 0.1 MG/24H
1 PATCH, EXTENDED RELEASE TRANSDERMAL
Qty: 12 PATCH | Refills: 1 | Status: SHIPPED | OUTPATIENT
Start: 2022-07-14

## 2022-07-14 RX ORDER — FUROSEMIDE 40 MG/1
TABLET ORAL DAILY
COMMUNITY
End: 2022-08-02 | Stop reason: SDUPTHER

## 2022-07-14 RX ORDER — PREGABALIN 75 MG/1
75 CAPSULE ORAL 2 TIMES DAILY
Qty: 60 CAPSULE | Refills: 2 | Status: SHIPPED | OUTPATIENT
Start: 2022-07-14 | End: 2022-11-02 | Stop reason: SDUPTHER

## 2022-07-14 RX ORDER — AMLODIPINE BESYLATE 10 MG/1
10 TABLET ORAL DAILY
Qty: 90 TABLET | Refills: 1 | Status: SHIPPED | OUTPATIENT
Start: 2022-07-14

## 2022-07-14 NOTE — PROGRESS NOTES
Chief Complaint   Patient presents with   [facility] Follow Up       Visit Vitals  BP (!) 172/98 (BP 1 Location: Left upper arm, BP Patient Position: Sitting)   Pulse 76   Temp 97.1 °F (36.2 °C) (Temporal)   Resp 18   Ht 5' 9\" (1.753 m)   Wt 163 lb (73.9 kg)   SpO2 98%   BMI 24.07 kg/m²        Health Maintenance Due   Topic    Pneumococcal 0-64 years (2 - PCV)    Foot Exam Q1     COVID-19 Vaccine (3 - Booster for Moderna series)    MICROALBUMIN Q1         1. Have you been to the ER, urgent care clinic since your last visit? Hospitalized since your last visit? Yes When: 7/10/2022 [facility]     2. Have you seen or consulted any other health care providers outside of the [address] [place] [address] since your last visit? Include any pap smears or colon screening.  No

## 2022-07-14 NOTE — PROGRESS NOTES
Martinez Webb is a 46 y.o.  male and presents with     Chief Complaint   Patient presents with   [facility] Follow Up     Transition of care note  Admit date: 7/10/2022     Discharge date : 7/12/2022     Reason for admission - vomiting    Dsicharge diagnosis - gastric ulcer, esophagitis    Patient is here for posthospital follow-up. Was admitted for vomiting and nausea. An EGD that showed esophagitis and gastric ulcer. Was placed on PPI and given IV fluids  Pt sees Dr Trish Fernandez  Is now on insulin pump and also got Dexcom  Pt has back pain , keeps falling  Patient has neuropathy in his feet and is requesting Lyrica. Used to be prescribed by neurology. He is also requesting a walker and handicap parking. Blood pressure is elevated    Past Medical History:   Diagnosis Date    Bipolar 1 disorder, depressed ([address]     Bipolar disorder ([address]     Chronic kidney disease, stage 3a ([address]     Depression     Diabetes ([address]     DKA, type 1 ([address] 1/27/2013    diagnosed age 23    DKA, type 1 ([address]     H/O noncompliance with medical treatment, presenting hazards to health     MRSA (methicillin resistant staph aureus) culture positive     MRSA (methicillin resistant Staphylococcus aureus)     Face    Noncompliance with medication regimen     Second hand smoke exposure     Seizure ([address]     Seizures ([address] 2006 or 2007    one episode during intermediate     Past Surgical History:   Procedure Laterality Date    HX HEENT      top left wisdom tooth    HX ORTHOPAEDIC Left     wrist; MCV    UPPER GI ENDOSCOPY,BIOPSY  11/20/2018          Current Outpatient Medications   Medication Sig    rosuvastatin (CRESTOR) 40 mg tablet     furosemide (LASIX) 40 mg tablet Take  by mouth daily.  pregabalin (Lyrica) 75 mg capsule Take 1 Capsule by mouth two (2) times a day. Max Daily Amount: 150 mg.  Walker (Ultra-Light Rollator) misc Use while ambulating    amLODIPine (NORVASC) 10 mg tablet Take 1 Tablet by mouth daily.     cloNIDine (CATAPRES) 0.1 mg/24 hr ptwk 1 Patch by TransDERmal route every seven (7) days.  ondansetron (ZOFRAN ODT) 4 mg disintegrating tablet Take 1 Tablet by mouth every eight (8) hours as needed for Nausea or Vomiting for up to 30 days.  pantoprazole (PROTONIX) 40 mg tablet Take 1 Tablet by mouth Before breakfast and dinner for 30 days. Indications: inflammation of the esophagus with erosion    acetaminophen (TYLENOL) 325 mg tablet Take 2 Tablets by mouth every four (4) hours as needed for Pain or Fever for up to 30 days.  sucralfate (CARAFATE) 1 gram tablet Take 1 Tablet by mouth Before breakfast, lunch, dinner and at bedtime for 14 days. Indications: reflux esophagitis, or inflammation of the esophagus from backflow of stomach acid    ferrous sulfate 325 mg (65 mg iron) tablet Take 1 Tablet by mouth daily (with breakfast) for 30 days.  Dexcom G6  misc Use with the dexcom device to check blood sugars 4 times a day    DULoxetine (CYMBALTA) 60 mg capsule Take 1 capsule by mouth once daily    Dexcom G6 Sensor brandi Use as directed every 10 days    Dexcom G6 Transmitter brandi Use as directed   [employer] [facility] 14 Day Sensor kit Use as directed every 14 days    Insulin Needles, Disposable, 31 gauge x 5/16\" ndle Dx code E11.65, use 6x daily    insulin glargine (LANTUS) 100 unit/mL injection 16 Units by SubCUTAneous route daily.  insulin aspart U-100 (NovoLOG Flexpen U-100 Insulin) 100 unit/mL (3 mL) inpn Take 1 unit for every 15 grams of carbohydrates, 1 unit for every 50 points >150. Max daily dose 25U.  carvediloL (COREG) 12.5 mg tablet Take 1 Tablet by mouth two (2) times daily (with meals).  tamsulosin (FLOMAX) 0.4 mg capsule Take 0.4 mg by mouth daily.  pen needle, diabetic 30 gauge x 3/16\" ndle 5 Units by Does Not Apply route Before breakfast, lunch, and dinner.  finasteride (PROSCAR) 5 mg tablet Take 1 Tablet by mouth daily.     insulin aspart U-100 (NovoLOG U-100 Insulin aspart) 100 unit/mL injection Use as instructed via insulin pump. Dx code E10.65 max daily dose 50U    rosuvastatin (CRESTOR) 20 mg tablet Take 1 Tablet by mouth nightly. (Patient not taking: Reported on 7/14/2022)    ergocalciferol (ERGOCALCIFEROL) 1,250 mcg (50,000 unit) capsule Take 1 capsule by mouth once a week (Patient not taking: Reported on 5/10/2022)     No current facility-administered medications for this visit. Health Maintenance   Topic Date Due    Pneumococcal 0-64 years (2 - PCV) 08/18/2012    Foot Exam Q1  03/07/2021    COVID-19 Vaccine (3 - Booster for Moderna series) 03/13/2022    MICROALBUMIN Q1  04/01/2022    Flu Vaccine (1) 09/01/2022    A1C test (Diabetic or Prediabetic)  10/10/2022    Depression Screen  02/24/2023    Lipid Screen  04/01/2023    Eye Exam Retinal or Dilated  06/20/2024    DTaP/Tdap/Td series (2 - Td or Tdap) 03/09/2028    Hepatitis C Screening  Completed     Immunization History   Administered Date(s) Administered    (RETIRED) Pneumococcal Vaccine (Unspecified Type) 08/18/2011    COVID-19, MODERNA BLUE border, Primary or Immunocompromised, (age 18y+), IM, 100 mcg/0.5mL 09/11/2021, 10/13/2021    MMR 03/23/1995    Pneumococcal Polysaccharide (PPSV-23) 08/18/2011    Rabies Vaccine IM 09/17/1992    TD Vaccine 03/08/2011    Tdap 03/09/2018     No LMP for male patient. Allergies and Intolerances:   No Known Allergies    Family History:   Family History   Problem Relation Age of Onset    Diabetes Mother     Diabetes Other         neice, type 1        Social History:   He  reports that he quit smoking about 2 years ago. His smoking use included cigarettes. He started smoking about 22 years ago. He has a 1.60 pack-year smoking history. He has never used smokeless tobacco.  He  reports no history of alcohol use.             Review of Systems:   General: negative for - chills, fatigue, fever, weight change  Psych: negative for - anxiety, depression, irritability or mood swings  ENT: negative for - headaches, hearing change, nasal congestion, oral lesions, sneezing or sore throat  Heme/ Lymph: negative for - bleeding problems, bruising, pallor or swollen lymph nodes  Endo: negative for - hot flashes, polydipsia/polyuria or temperature intolerance  Resp: negative for - cough, shortness of breath or wheezing  CV: negative for - chest pain, edema or palpitations  GI: negative for - abdominal pain, change in bowel habits, constipation, diarrhea or nausea/vomiting  : negative for - dysuria, hematuria, incontinence, pelvic pain or vulvar/vaginal symptoms  MSK: negative for - joint pain, joint swelling or muscle pain  Neuro: negative for - confusion, headaches, seizures or weakness  Derm: negative for - dry skin, hair changes, rash or skin lesion changes          Physical:   Vitals:   Vitals:    07/14/22 1104   BP: (!) 172/98   Pulse: 76   Resp: 18   Temp: 97.1 °F (36.2 °C)   TempSrc: Temporal   SpO2: 98%   Weight: 163 lb (73.9 kg)   Height: 5' 9\" (1.753 m)           Exam:   HEENT- atraumatic,normocephalic, awake, oriented, well nourished  Neck - supple,no enlarged lymph nodes, no JVD, no thyromegaly  Chest- CTA, no rhonchi, no crackles  Heart- rrr, no murmurs / gallop/rub  Abdomen- soft,BS+,NT, no hepatosplenomegaly  Ext - no c/c/edema   Neuro- no focal deficits. Power 5/5 all extremities  Skin - warm,dry, no obvious rashes.           Review of Data:   LABS:   Lab Results   Component Value Date/Time    WBC 8.1 07/12/2022 04:06 AM    HGB 8.9 (L) 07/12/2022 04:06 AM    HCT 26.6 (L) 07/12/2022 04:06 AM    PLATELET 769 (H) 82/54/8017 04:06 AM     Lab Results   Component Value Date/Time    Sodium 136 07/12/2022 04:06 AM    Potassium 3.6 07/12/2022 04:06 AM    Chloride 107 07/12/2022 04:06 AM    CO2 23 07/12/2022 04:06 AM    Glucose 57 (L) 07/12/2022 04:06 AM    BUN 40 (H) 07/12/2022 04:06 AM    Creatinine 3.48 (H) 07/12/2022 04:06 AM     Lab Results   Component Value Date/Time    Cholesterol, total 192 11/15/2021 12:01 PM    HDL Cholesterol 61 11/15/2021 12:01 PM    LDL, calculated 89.4 11/15/2021 12:01 PM    Triglyceride 208 (H) 11/15/2021 12:01 PM     No components found for: GPT        Impression / Plan:        ICD-10-CM ICD-9-CM    1. DM type 2, goal HbA1c < 7% ([facility])  E11.9 250.00 Walker (Ultra-Light Rollator) [facility]      REFERRAL TO NEPHROLOGY   2. Hypercholesteremia  E78.00 272.0    3. Gastric ulcer without hemorrhage or perforation, unspecified chronicity  K25.9 531.90    4. Esophagitis  K20.90 530.10    5. Other diabetic neurological complication associated with type 2 diabetes mellitus ([facility])  E11.49 250.60 pregabalin (Lyrica) 75 mg capsule   6. Recurrent falls  R29.6 V15.88 pregabalin (Lyrica) 75 mg capsule      Walker (Ultra-Light Rollator) misc   7. Diabetic retinopathy associated with type 2 diabetes mellitus, macular edema presence unspecified, unspecified laterality, unspecified retinopathy severity ([facility]  E11.319 250.50      362.01    8. Chronic kidney disease, unspecified CKD stage  N18.9 585.9 PROTEIN, URINE, 24 HR      REFERRAL TO NEPHROLOGY   9. Primary hypertension  I10 401.9 amLODIPine (NORVASC) 10 mg tablet      cloNIDine (CATAPRES) 0.1 mg/24 hr ptwk         Explained to patient risk benefits of the medications. Advised patient to stop meds if having any side effects. Pt verbalized understanding of the instructions. I have discussed the diagnosis with the patient and the intended plan as seen in the above orders. The patient has received an after-visit summary and questions were answered concerning future plans. I have discussed medication side effects and warnings with the patient as well. I have reviewed the plan of care with the patient, accepted their input and they are in agreement with the treatment goals. Reviewed plan of care. Patient has provided input and agrees with goals.     Follow-up and Dispositions    · Return in about 6 weeks (around 8/25/2022).          Praveen Barraza MD

## 2022-07-15 ENCOUNTER — OFFICE VISIT (OUTPATIENT)
Dept: DIABETES SERVICES | Age: 42
End: 2022-07-15

## 2022-07-15 DIAGNOSIS — E10.65 HYPERGLYCEMIA DUE TO TYPE 1 DIABETES MELLITUS (HCC): ICD-10-CM

## 2022-07-15 RX ORDER — INSULIN ASPART 100 [IU]/ML
INJECTION, SOLUTION INTRAVENOUS; SUBCUTANEOUS
Qty: 30 ML | Refills: 2 | Status: SHIPPED | OUTPATIENT
Start: 2022-07-15 | End: 2022-10-25

## 2022-07-15 NOTE — PROGRESS NOTES
Pt completed insulin pump training per [employer] guidelines for their t:slimx2 with Control IQ . Completed training checklist per pump company guidelines and demonstrated understanding. Pt is using [employer] XC infusion set with 6mm cannula with a fill amount: 0.3 units. Settings programed today per Dr. Monterroso Norma order:    Timed Settings Basal   unit/hour Correction Factor   1 unit: mg/dl Carb Ratio   1 unit: gram Target BG  1 unit: mg/dl   12:00 am 0.65 50 15 110   0800 0.85 50 15 110   1200 1.5 50 15 110   1700 1.4 50 15 110                   Patient able to sync Dexcom G6 during session- sensor code 7171.

## 2022-07-25 RX ORDER — FINASTERIDE 5 MG/1
5 TABLET, FILM COATED ORAL DAILY
Qty: 30 TABLET | Refills: 2 | Status: SHIPPED | OUTPATIENT
Start: 2022-07-25

## 2022-08-01 DIAGNOSIS — R60.0 LOCALIZED EDEMA: Primary | ICD-10-CM

## 2022-08-01 NOTE — TELEPHONE ENCOUNTER
Patient calling because he stated he had a lot of edema on his legs and want to have Dr. Roseann Muhammad sent rx to the pharmacy that he use to take for this problem. Please send to pharmacy on file or call patient.

## 2022-08-02 ENCOUNTER — TELEPHONE (OUTPATIENT)
Dept: DIABETES SERVICES | Age: 42
End: 2022-08-02

## 2022-08-02 RX ORDER — FUROSEMIDE 40 MG/1
40 TABLET ORAL DAILY
Qty: 90 TABLET | Refills: 0 | Status: SHIPPED | OUTPATIENT
Start: 2022-08-02 | End: 2022-08-20

## 2022-08-02 NOTE — TELEPHONE ENCOUNTER
Patient called to say that he believes that the tubing from his [employer] pump contains blood. He states he is due for a set and cartridge change tomorrow. Advised to do a set and cartridge change today and test BG 2 hours later to make sure his site is working properly. Patient states he is in agreement.      Marifer Granda RD, [facility]

## 2022-08-04 ENCOUNTER — TELEPHONE (OUTPATIENT)
Dept: DIABETES SERVICES | Age: 42
End: 2022-08-04

## 2022-08-04 NOTE — TELEPHONE ENCOUNTER
Call received 8/3/2022 (after hours)- returned 8/4/2022 Mr. Gildardo Espinoza states that he continues to have blood in his tubing. He reports that he is pressing hard when inserting the sight into his skin. Discussed the importance of not pressing hard when trying to get the sight to stick. He is to change his set to start over to try and solve this issue he keeps having. To call with any other questions or concerns.     Pia Waldron RD, [facility]

## 2022-08-09 ENCOUNTER — HOSPITAL ENCOUNTER (EMERGENCY)
Age: 42
Discharge: HOME | DRG: 420 | End: 2022-08-09
Attending: EMERGENCY MEDICINE
Payer: MEDICAID

## 2022-08-09 ENCOUNTER — APPOINTMENT (OUTPATIENT)
Dept: GENERAL RADIOLOGY | Age: 42
DRG: 420 | End: 2022-08-09
Attending: EMERGENCY MEDICINE
Payer: MEDICAID

## 2022-08-09 VITALS
OXYGEN SATURATION: 95 % | DIASTOLIC BLOOD PRESSURE: 96 MMHG | RESPIRATION RATE: 20 BRPM | BODY MASS INDEX: 27 KG/M2 | HEART RATE: 81 BPM | SYSTOLIC BLOOD PRESSURE: 163 MMHG | TEMPERATURE: 97.5 F | HEIGHT: 69 IN | WEIGHT: 182.32 LBS

## 2022-08-09 DIAGNOSIS — R60.9 PERIPHERAL EDEMA: Primary | ICD-10-CM

## 2022-08-09 DIAGNOSIS — N18.4 CKD (CHRONIC KIDNEY DISEASE) STAGE 4, GFR 15-29 ML/MIN (HCC): ICD-10-CM

## 2022-08-09 LAB
ALBUMIN SERPL-MCNC: 2.1 G/DL (ref 3.5–5)
ALBUMIN/GLOB SERPL: 0.5 {RATIO} (ref 1.1–2.2)
ALP SERPL-CCNC: 178 U/L (ref 45–117)
ALT SERPL-CCNC: 34 U/L (ref 12–78)
ANION GAP SERPL CALC-SCNC: 8 MMOL/L (ref 5–15)
APPEARANCE UR: CLEAR
AST SERPL-CCNC: 27 U/L (ref 15–37)
ATRIAL RATE: 82 BPM
BACTERIA URNS QL MICRO: ABNORMAL /HPF
BASOPHILS # BLD: 0.1 K/UL (ref 0–0.1)
BASOPHILS NFR BLD: 1 % (ref 0–1)
BILIRUB SERPL-MCNC: 0.2 MG/DL (ref 0.2–1)
BILIRUB UR QL: NEGATIVE
BNP SERPL-MCNC: 4600 PG/ML
BUN SERPL-MCNC: 44 MG/DL (ref 6–20)
BUN/CREAT SERPL: 10 (ref 12–20)
CALCIUM SERPL-MCNC: 8.8 MG/DL (ref 8.5–10.1)
CALCULATED P AXIS, ECG09: 66 DEGREES
CALCULATED R AXIS, ECG10: 54 DEGREES
CALCULATED T AXIS, ECG11: 78 DEGREES
CHLORIDE SERPL-SCNC: 109 MMOL/L (ref 97–108)
CO2 SERPL-SCNC: 22 MMOL/L (ref 21–32)
COLOR UR: ABNORMAL
CREAT SERPL-MCNC: 4.24 MG/DL (ref 0.7–1.3)
DIAGNOSIS, 93000: NORMAL
DIFFERENTIAL METHOD BLD: ABNORMAL
EOSINOPHIL # BLD: 0.5 K/UL (ref 0–0.4)
EOSINOPHIL NFR BLD: 7 % (ref 0–7)
EPITH CASTS URNS QL MICRO: ABNORMAL /LPF
ERYTHROCYTE [DISTWIDTH] IN BLOOD BY AUTOMATED COUNT: 15.6 % (ref 11.5–14.5)
GLOBULIN SER CALC-MCNC: 4.6 G/DL (ref 2–4)
GLUCOSE SERPL-MCNC: 126 MG/DL (ref 65–100)
GLUCOSE UR STRIP.AUTO-MCNC: 250 MG/DL
GRAN CASTS URNS QL MICRO: ABNORMAL /LPF
HCT VFR BLD AUTO: 31.8 % (ref 36.6–50.3)
HGB BLD-MCNC: 10.4 G/DL (ref 12.1–17)
HGB UR QL STRIP: ABNORMAL
IMM GRANULOCYTES # BLD AUTO: 0 K/UL (ref 0–0.04)
IMM GRANULOCYTES NFR BLD AUTO: 1 % (ref 0–0.5)
KETONES UR QL STRIP.AUTO: NEGATIVE MG/DL
LEUKOCYTE ESTERASE UR QL STRIP.AUTO: NEGATIVE
LYMPHOCYTES # BLD: 1.5 K/UL (ref 0.8–3.5)
LYMPHOCYTES NFR BLD: 25 % (ref 12–49)
MCH RBC QN AUTO: 28.9 PG (ref 26–34)
MCHC RBC AUTO-ENTMCNC: 32.7 G/DL (ref 30–36.5)
MCV RBC AUTO: 88.3 FL (ref 80–99)
MONOCYTES # BLD: 0.5 K/UL (ref 0–1)
MONOCYTES NFR BLD: 8 % (ref 5–13)
MUCOUS THREADS URNS QL MICRO: ABNORMAL /LPF
NEUTS SEG # BLD: 3.6 K/UL (ref 1.8–8)
NEUTS SEG NFR BLD: 58 % (ref 32–75)
NITRITE UR QL STRIP.AUTO: NEGATIVE
NRBC # BLD: 0 K/UL (ref 0–0.01)
NRBC BLD-RTO: 0 PER 100 WBC
P-R INTERVAL, ECG05: 166 MS
PH UR STRIP: 6.5 [PH] (ref 5–8)
PLATELET # BLD AUTO: 479 K/UL (ref 150–400)
PMV BLD AUTO: 10.1 FL (ref 8.9–12.9)
POTASSIUM SERPL-SCNC: 4 MMOL/L (ref 3.5–5.1)
PROT SERPL-MCNC: 6.7 G/DL (ref 6.4–8.2)
PROT UR STRIP-MCNC: 300 MG/DL
Q-T INTERVAL, ECG07: 406 MS
QRS DURATION, ECG06: 94 MS
QTC CALCULATION (BEZET), ECG08: 474 MS
RBC # BLD AUTO: 3.6 M/UL (ref 4.1–5.7)
RBC #/AREA URNS HPF: ABNORMAL /HPF (ref 0–5)
SODIUM SERPL-SCNC: 139 MMOL/L (ref 136–145)
SP GR UR REFRACTOMETRY: 1.01
TROPONIN-HIGH SENSITIVITY: 13 NG/L (ref 0–76)
UA: UC IF INDICATED,UAUC: ABNORMAL
UROBILINOGEN UR QL STRIP.AUTO: 0.2 EU/DL (ref 0.2–1)
VENTRICULAR RATE, ECG03: 82 BPM
WAXY CASTS URNS QL MICRO: ABNORMAL /LPF
WBC # BLD AUTO: 6.2 K/UL (ref 4.1–11.1)
WBC URNS QL MICRO: ABNORMAL /HPF (ref 0–4)

## 2022-08-09 PROCEDURE — 71045 X-RAY EXAM CHEST 1 VIEW: CPT

## 2022-08-09 PROCEDURE — 36415 COLL VENOUS BLD VENIPUNCTURE: CPT

## 2022-08-09 PROCEDURE — 80053 COMPREHEN METABOLIC PANEL: CPT

## 2022-08-09 PROCEDURE — 96375 TX/PRO/DX INJ NEW DRUG ADDON: CPT

## 2022-08-09 PROCEDURE — 84484 ASSAY OF TROPONIN QUANT: CPT

## 2022-08-09 PROCEDURE — 93005 ELECTROCARDIOGRAM TRACING: CPT

## 2022-08-09 PROCEDURE — 85025 COMPLETE CBC W/AUTO DIFF WBC: CPT

## 2022-08-09 PROCEDURE — 99285 EMERGENCY DEPT VISIT HI MDM: CPT

## 2022-08-09 PROCEDURE — 74011250636 HC RX REV CODE- 250/636: Performed by: EMERGENCY MEDICINE

## 2022-08-09 PROCEDURE — 96374 THER/PROPH/DIAG INJ IV PUSH: CPT

## 2022-08-09 PROCEDURE — 81001 URINALYSIS AUTO W/SCOPE: CPT

## 2022-08-09 PROCEDURE — 83880 ASSAY OF NATRIURETIC PEPTIDE: CPT

## 2022-08-09 RX ORDER — MORPHINE SULFATE 2 MG/ML
4 INJECTION, SOLUTION INTRAMUSCULAR; INTRAVENOUS
Status: COMPLETED | OUTPATIENT
Start: 2022-08-09 | End: 2022-08-09

## 2022-08-09 RX ORDER — FUROSEMIDE 10 MG/ML
60 INJECTION INTRAMUSCULAR; INTRAVENOUS
Status: COMPLETED | OUTPATIENT
Start: 2022-08-09 | End: 2022-08-09

## 2022-08-09 RX ORDER — MORPHINE SULFATE 2 MG/ML
INJECTION, SOLUTION INTRAMUSCULAR; INTRAVENOUS
Status: DISCONTINUED
Start: 2022-08-09 | End: 2022-08-09 | Stop reason: HOSPADM

## 2022-08-09 RX ADMIN — MORPHINE SULFATE 4 MG: 2 INJECTION, SOLUTION INTRAMUSCULAR; INTRAVENOUS at 18:41

## 2022-08-09 RX ADMIN — FUROSEMIDE 60 MG: 10 INJECTION, SOLUTION INTRAMUSCULAR; INTRAVENOUS at 18:40

## 2022-08-09 NOTE — ED NOTES
Chief Complaint   Patient presents with    Leg Swelling     Pt reports new onset swelling throughout body, most noticeable in bilateral legs, pt reports swelling is painful, states he has stage 3 kidney disease      Leg swelling x5 days, progressively worsen. Pt also increase swelling in scutum, upper extremities and face. Has not missed any lasix doses. Gained 30lbs in 2 weeks.

## 2022-08-09 NOTE — ED NOTES
Bedside and Verbal shift change report given to Maynor CROWLEY RN and Katie Sorensen RN   (oncoming nurse) by Ted Altman (offgoing nurse). Report included the following information SBAR, ED Summary, Intake/Output, MAR and Recent Results.

## 2022-08-10 NOTE — DISCHARGE INSTRUCTIONS
You should INCREASE your Lasix dose to 40 mg TWICE a day. It is very important that you follow up with the kidney specialist (nephrologist), because you may need to get set up to start dialysis, which can involve small surgeries or procedures. Return to the ED if you have worsening shortness of breath or other concerns. For pain, take Tylenol 650 mg.

## 2022-08-10 NOTE — ED NOTES
Pt resting in low-fowlers with blanket over head, Aox4, pleasant and conversational; vital signs stable, warm blanket provided for comfort, family at bedside, indicates no needs at this time.

## 2022-08-11 ENCOUNTER — APPOINTMENT (OUTPATIENT)
Dept: GENERAL RADIOLOGY | Age: 42
DRG: 420 | End: 2022-08-11
Attending: EMERGENCY MEDICINE
Payer: MEDICAID

## 2022-08-11 ENCOUNTER — HOSPITAL ENCOUNTER (INPATIENT)
Age: 42
LOS: 8 days | Discharge: HOME | DRG: 420 | End: 2022-08-20
Attending: EMERGENCY MEDICINE | Admitting: HOSPITALIST
Payer: MEDICAID

## 2022-08-11 DIAGNOSIS — J96.01 ACUTE RESPIRATORY FAILURE WITH HYPOXIA (HCC): ICD-10-CM

## 2022-08-11 DIAGNOSIS — E86.0 DEHYDRATION: ICD-10-CM

## 2022-08-11 DIAGNOSIS — N17.9 ACUTE RENAL FAILURE, UNSPECIFIED ACUTE RENAL FAILURE TYPE (HCC): ICD-10-CM

## 2022-08-11 DIAGNOSIS — E87.5 ACUTE HYPERKALEMIA: ICD-10-CM

## 2022-08-11 DIAGNOSIS — E10.11 DIABETIC KETOACIDOSIS WITH COMA ASSOCIATED WITH TYPE 1 DIABETES MELLITUS (HCC): Primary | ICD-10-CM

## 2022-08-11 LAB
ADMINISTERED INITIALS, ADMINIT: NORMAL
BASE DEFICIT BLD-SCNC: 25.6 MMOL/L
CA-I BLD-MCNC: 1.08 MMOL/L (ref 1.12–1.32)
CHLORIDE BLD-SCNC: 106 MMOL/L (ref 100–108)
CO2 BLD-SCNC: 8 MMOL/L (ref 19–24)
CREAT UR-MCNC: 5.9 MG/DL (ref 0.6–1.3)
D50 ADMINISTERED, D50ADM: 0 ML
D50 ORDER, D50ORD: 0 ML
GLUCOSE BLD STRIP.AUTO-MCNC: >600 MG/DL (ref 65–117)
GLUCOSE BLD STRIP.AUTO-MCNC: >700 MG/DL (ref 74–106)
GLUCOSE, GLC: 601 MG/DL
HCO3 BLDA-SCNC: 7 MMOL/L
HIGH TARGET, HITG: 250 MG/DL
INSULIN ADMINSTERED, INSADM: 10.8 UNITS/HOUR
INSULIN ORDER, INSORD: 10.8 UNITS/HOUR
LACTATE BLD-SCNC: 1.34 MMOL/L (ref 0.4–2)
LOW TARGET, LOT: 150 MG/DL
MINUTES UNTIL NEXT BG, NBG: 60 MIN
MULTIPLIER, MUL: 0.02
ORDER INITIALS, ORDINIT: NORMAL
PCO2 BLDV: 41.9 MMHG (ref 41–51)
PH BLDV: 6.83 [PH] (ref 7.32–7.42)
PO2 BLDV: 41 MMHG (ref 25–40)
POTASSIUM BLD-SCNC: 5.8 MMOL/L (ref 3.5–5.5)
SERVICE CMNT-IMP: 5555
SERVICE CMNT-IMP: ABNORMAL
SODIUM BLD-SCNC: 128 MMOL/L (ref 136–145)
SPECIMEN SITE: ABNORMAL

## 2022-08-11 PROCEDURE — 93005 ELECTROCARDIOGRAM TRACING: CPT

## 2022-08-11 PROCEDURE — 82962 GLUCOSE BLOOD TEST: CPT

## 2022-08-11 PROCEDURE — 82947 ASSAY GLUCOSE BLOOD QUANT: CPT

## 2022-08-11 PROCEDURE — 85025 COMPLETE CBC W/AUTO DIFF WBC: CPT

## 2022-08-11 PROCEDURE — 74011250636 HC RX REV CODE- 250/636: Performed by: EMERGENCY MEDICINE

## 2022-08-11 PROCEDURE — 96374 THER/PROPH/DIAG INJ IV PUSH: CPT

## 2022-08-11 PROCEDURE — 74011000258 HC RX REV CODE- 258: Performed by: EMERGENCY MEDICINE

## 2022-08-11 PROCEDURE — 96375 TX/PRO/DX INJ NEW DRUG ADDON: CPT

## 2022-08-11 PROCEDURE — 80053 COMPREHEN METABOLIC PANEL: CPT

## 2022-08-11 PROCEDURE — 99291 CRITICAL CARE FIRST HOUR: CPT

## 2022-08-11 PROCEDURE — 87040 BLOOD CULTURE FOR BACTERIA: CPT

## 2022-08-11 PROCEDURE — 71045 X-RAY EXAM CHEST 1 VIEW: CPT

## 2022-08-11 PROCEDURE — 74011636637 HC RX REV CODE- 636/637: Performed by: EMERGENCY MEDICINE

## 2022-08-11 PROCEDURE — 84484 ASSAY OF TROPONIN QUANT: CPT

## 2022-08-11 PROCEDURE — 87635 SARS-COV-2 COVID-19 AMP PRB: CPT

## 2022-08-11 PROCEDURE — 36415 COLL VENOUS BLD VENIPUNCTURE: CPT

## 2022-08-11 RX ORDER — LEVOFLOXACIN 5 MG/ML
750 INJECTION, SOLUTION INTRAVENOUS ONCE
Status: COMPLETED | OUTPATIENT
Start: 2022-08-12 | End: 2022-08-12

## 2022-08-11 RX ORDER — DEXTROSE MONOHYDRATE 100 MG/ML
0-250 INJECTION, SOLUTION INTRAVENOUS AS NEEDED
Status: DISCONTINUED | OUTPATIENT
Start: 2022-08-11 | End: 2022-08-13

## 2022-08-11 RX ORDER — ONDANSETRON 2 MG/ML
4 INJECTION INTRAMUSCULAR; INTRAVENOUS
Status: COMPLETED | OUTPATIENT
Start: 2022-08-11 | End: 2022-08-11

## 2022-08-11 RX ORDER — INSULIN LISPRO 100 [IU]/ML
INJECTION, SOLUTION INTRAVENOUS; SUBCUTANEOUS
Status: DISCONTINUED | OUTPATIENT
Start: 2022-08-12 | End: 2022-08-12

## 2022-08-11 RX ORDER — SODIUM CHLORIDE 0.9 % (FLUSH) 0.9 %
5-10 SYRINGE (ML) INJECTION AS NEEDED
Status: DISCONTINUED | OUTPATIENT
Start: 2022-08-11 | End: 2022-08-13 | Stop reason: SDUPTHER

## 2022-08-11 RX ORDER — MAGNESIUM SULFATE 100 %
4 CRYSTALS MISCELLANEOUS AS NEEDED
Status: DISCONTINUED | OUTPATIENT
Start: 2022-08-11 | End: 2022-08-13

## 2022-08-11 RX ADMIN — Medication 10.8 UNITS/HR: at 23:54

## 2022-08-11 RX ADMIN — ONDANSETRON 4 MG: 2 INJECTION INTRAMUSCULAR; INTRAVENOUS at 23:30

## 2022-08-11 RX ADMIN — Medication 10 UNITS: at 23:29

## 2022-08-11 RX ADMIN — SODIUM CHLORIDE 2121 ML: 9 INJECTION, SOLUTION INTRAVENOUS at 23:47

## 2022-08-11 RX ADMIN — CEFEPIME 2 G: 2 INJECTION, POWDER, FOR SOLUTION INTRAVENOUS at 23:46

## 2022-08-12 ENCOUNTER — APPOINTMENT (OUTPATIENT)
Dept: GENERAL RADIOLOGY | Age: 42
DRG: 420 | End: 2022-08-12
Attending: EMERGENCY MEDICINE
Payer: MEDICAID

## 2022-08-12 LAB
ADMINISTERED INITIALS, ADMINIT: NORMAL
ALBUMIN SERPL-MCNC: 1.9 G/DL (ref 3.5–5)
ALBUMIN/GLOB SERPL: 0.5 {RATIO} (ref 1.1–2.2)
ALP SERPL-CCNC: 179 U/L (ref 45–117)
ALT SERPL-CCNC: 32 U/L (ref 12–78)
AMPHET UR QL SCN: NEGATIVE
ANION GAP SERPL CALC-SCNC: 13 MMOL/L (ref 5–15)
ANION GAP SERPL CALC-SCNC: 13 MMOL/L (ref 5–15)
ANION GAP SERPL CALC-SCNC: 14 MMOL/L (ref 5–15)
ANION GAP SERPL CALC-SCNC: 15 MMOL/L (ref 5–15)
ANION GAP SERPL CALC-SCNC: 22 MMOL/L (ref 5–15)
ANION GAP SERPL CALC-SCNC: 22 MMOL/L (ref 5–15)
ANION GAP SERPL CALC-SCNC: 25 MMOL/L (ref 5–15)
APPEARANCE UR: CLEAR
ARTERIAL PATENCY WRIST A: ABNORMAL
ARTERIAL PATENCY WRIST A: ABNORMAL
AST SERPL-CCNC: 36 U/L (ref 15–37)
ATRIAL RATE: 49 BPM
BACTERIA URNS QL MICRO: NEGATIVE /HPF
BARBITURATES UR QL SCN: NEGATIVE
BASE DEFICIT BLD-SCNC: 25.7 MMOL/L
BASE DEFICIT BLDA-SCNC: 13.3 MMOL/L
BASE DEFICIT BLDA-SCNC: 20.5 MMOL/L
BASOPHILS # BLD: 0 K/UL (ref 0–0.1)
BASOPHILS NFR BLD: 0 % (ref 0–1)
BDY SITE: ABNORMAL
BDY SITE: ABNORMAL
BENZODIAZ UR QL: NEGATIVE
BILIRUB SERPL-MCNC: 0.4 MG/DL (ref 0.2–1)
BILIRUB UR QL: NEGATIVE
BUN SERPL-MCNC: 77 MG/DL (ref 6–20)
BUN SERPL-MCNC: 77 MG/DL (ref 6–20)
BUN SERPL-MCNC: 78 MG/DL (ref 6–20)
BUN SERPL-MCNC: 81 MG/DL (ref 6–20)
BUN SERPL-MCNC: 82 MG/DL (ref 6–20)
BUN SERPL-MCNC: 82 MG/DL (ref 6–20)
BUN SERPL-MCNC: 84 MG/DL (ref 6–20)
BUN/CREAT SERPL: 14 (ref 12–20)
BUN/CREAT SERPL: 15 (ref 12–20)
BUN/CREAT SERPL: 15 (ref 12–20)
CALCIUM SERPL-MCNC: 7.6 MG/DL (ref 8.5–10.1)
CALCIUM SERPL-MCNC: 7.8 MG/DL (ref 8.5–10.1)
CALCIUM SERPL-MCNC: 7.8 MG/DL (ref 8.5–10.1)
CALCIUM SERPL-MCNC: 7.9 MG/DL (ref 8.5–10.1)
CALCIUM SERPL-MCNC: 7.9 MG/DL (ref 8.5–10.1)
CALCIUM SERPL-MCNC: 8 MG/DL (ref 8.5–10.1)
CALCIUM SERPL-MCNC: 8 MG/DL (ref 8.5–10.1)
CALCULATED P AXIS, ECG09: 80 DEGREES
CALCULATED R AXIS, ECG10: 89 DEGREES
CALCULATED T AXIS, ECG11: 54 DEGREES
CANNABINOIDS UR QL SCN: POSITIVE
CHLORIDE SERPL-SCNC: 104 MMOL/L (ref 97–108)
CHLORIDE SERPL-SCNC: 105 MMOL/L (ref 97–108)
CHLORIDE SERPL-SCNC: 108 MMOL/L (ref 97–108)
CHLORIDE SERPL-SCNC: 110 MMOL/L (ref 97–108)
CHLORIDE SERPL-SCNC: 99 MMOL/L (ref 97–108)
CO2 SERPL-SCNC: 10 MMOL/L (ref 21–32)
CO2 SERPL-SCNC: 17 MMOL/L (ref 21–32)
CO2 SERPL-SCNC: 20 MMOL/L (ref 21–32)
CO2 SERPL-SCNC: 21 MMOL/L (ref 21–32)
CO2 SERPL-SCNC: 22 MMOL/L (ref 21–32)
CO2 SERPL-SCNC: 8 MMOL/L (ref 21–32)
CO2 SERPL-SCNC: 9 MMOL/L (ref 21–32)
COCAINE UR QL SCN: NEGATIVE
COLOR UR: ABNORMAL
COVID-19 RAPID TEST, COVR: NOT DETECTED
CREAT SERPL-MCNC: 5.39 MG/DL (ref 0.7–1.3)
CREAT SERPL-MCNC: 5.44 MG/DL (ref 0.7–1.3)
CREAT SERPL-MCNC: 5.5 MG/DL (ref 0.7–1.3)
CREAT SERPL-MCNC: 5.5 MG/DL (ref 0.7–1.3)
CREAT SERPL-MCNC: 5.58 MG/DL (ref 0.7–1.3)
CREAT SERPL-MCNC: 5.7 MG/DL (ref 0.7–1.3)
CREAT SERPL-MCNC: 5.93 MG/DL (ref 0.7–1.3)
D50 ADMINISTERED, D50ADM: 0 ML
D50 ADMINISTERED, D50ADM: 14 ML
D50 ADMINISTERED, D50ADM: 14 ML
D50 ORDER, D50ORD: 0 ML
D50 ORDER, D50ORD: 14 ML
D50 ORDER, D50ORD: 14 ML
DIAGNOSIS, 93000: NORMAL
DIFFERENTIAL METHOD BLD: ABNORMAL
DRUG SCRN COMMENT,DRGCM: ABNORMAL
EOSINOPHIL # BLD: 0 K/UL (ref 0–0.4)
EOSINOPHIL NFR BLD: 0 % (ref 0–7)
EPITH CASTS URNS QL MICRO: ABNORMAL /LPF
ERYTHROCYTE [DISTWIDTH] IN BLOOD BY AUTOMATED COUNT: 15.1 % (ref 11.5–14.5)
ERYTHROCYTE [DISTWIDTH] IN BLOOD BY AUTOMATED COUNT: 15.3 % (ref 11.5–14.5)
ERYTHROCYTE [DISTWIDTH] IN BLOOD BY AUTOMATED COUNT: 15.3 % (ref 11.5–14.5)
ERYTHROCYTE [DISTWIDTH] IN BLOOD BY AUTOMATED COUNT: 15.5 % (ref 11.5–14.5)
EST. AVERAGE GLUCOSE BLD GHB EST-MCNC: 203 MG/DL
FIO2 ON VENT: 100 %
FIO2 ON VENT: 80 %
GAS FLOW.O2 SETTING OXYMISER: 24 L/MIN
GAS FLOW.O2 SETTING OXYMISER: 24 L/MIN
GLOBULIN SER CALC-MCNC: 3.6 G/DL (ref 2–4)
GLUCOSE BLD STRIP.AUTO-MCNC: 109 MG/DL (ref 65–117)
GLUCOSE BLD STRIP.AUTO-MCNC: 117 MG/DL (ref 65–117)
GLUCOSE BLD STRIP.AUTO-MCNC: 150 MG/DL (ref 65–117)
GLUCOSE BLD STRIP.AUTO-MCNC: 218 MG/DL (ref 65–117)
GLUCOSE BLD STRIP.AUTO-MCNC: 278 MG/DL (ref 65–117)
GLUCOSE BLD STRIP.AUTO-MCNC: 340 MG/DL (ref 65–117)
GLUCOSE BLD STRIP.AUTO-MCNC: 370 MG/DL (ref 65–117)
GLUCOSE BLD STRIP.AUTO-MCNC: 437 MG/DL (ref 65–117)
GLUCOSE BLD STRIP.AUTO-MCNC: 486 MG/DL (ref 65–117)
GLUCOSE BLD STRIP.AUTO-MCNC: 486 MG/DL (ref 65–117)
GLUCOSE BLD STRIP.AUTO-MCNC: 519 MG/DL (ref 65–117)
GLUCOSE BLD STRIP.AUTO-MCNC: 561 MG/DL (ref 65–117)
GLUCOSE BLD STRIP.AUTO-MCNC: 571 MG/DL (ref 65–117)
GLUCOSE BLD STRIP.AUTO-MCNC: 65 MG/DL (ref 65–117)
GLUCOSE BLD STRIP.AUTO-MCNC: 65 MG/DL (ref 65–117)
GLUCOSE BLD STRIP.AUTO-MCNC: 82 MG/DL (ref 65–117)
GLUCOSE BLD STRIP.AUTO-MCNC: 84 MG/DL (ref 65–117)
GLUCOSE BLD STRIP.AUTO-MCNC: 88 MG/DL (ref 65–117)
GLUCOSE BLD STRIP.AUTO-MCNC: 93 MG/DL (ref 65–117)
GLUCOSE BLD STRIP.AUTO-MCNC: 94 MG/DL (ref 65–117)
GLUCOSE BLD STRIP.AUTO-MCNC: 95 MG/DL (ref 65–117)
GLUCOSE BLD STRIP.AUTO-MCNC: >600 MG/DL (ref 65–117)
GLUCOSE SERPL-MCNC: 207 MG/DL (ref 65–100)
GLUCOSE SERPL-MCNC: 388 MG/DL (ref 65–100)
GLUCOSE SERPL-MCNC: 646 MG/DL (ref 65–100)
GLUCOSE SERPL-MCNC: 698 MG/DL (ref 65–100)
GLUCOSE SERPL-MCNC: 756 MG/DL (ref 65–100)
GLUCOSE SERPL-MCNC: 812 MG/DL (ref 65–100)
GLUCOSE SERPL-MCNC: 93 MG/DL (ref 65–100)
GLUCOSE SERPL-MCNC: 98 MG/DL (ref 65–100)
GLUCOSE UR STRIP.AUTO-MCNC: >1000 MG/DL
GLUCOSE, GLC: 109 MG/DL
GLUCOSE, GLC: 117 MG/DL
GLUCOSE, GLC: 142 MG/DL
GLUCOSE, GLC: 150 MG/DL
GLUCOSE, GLC: 218 MG/DL
GLUCOSE, GLC: 278 MG/DL
GLUCOSE, GLC: 340 MG/DL
GLUCOSE, GLC: 370 MG/DL
GLUCOSE, GLC: 437 MG/DL
GLUCOSE, GLC: 486 MG/DL
GLUCOSE, GLC: 486 MG/DL
GLUCOSE, GLC: 519 MG/DL
GLUCOSE, GLC: 561 MG/DL
GLUCOSE, GLC: 571 MG/DL
GLUCOSE, GLC: 600 MG/DL
GLUCOSE, GLC: 600 MG/DL
GLUCOSE, GLC: 601 MG/DL
GLUCOSE, GLC: 65 MG/DL
GLUCOSE, GLC: 65 MG/DL
GLUCOSE, GLC: 82 MG/DL
GLUCOSE, GLC: 84 MG/DL
GLUCOSE, GLC: 88 MG/DL
GLUCOSE, GLC: 93 MG/DL
GLUCOSE, GLC: 94 MG/DL
GLUCOSE, GLC: 95 MG/DL
GRAN CASTS URNS QL MICRO: ABNORMAL /LPF
HBA1C MFR BLD: 8.7 % (ref 4–5.6)
HCO3 BLD-SCNC: 7.7 MMOL/L (ref 22–26)
HCO3 BLDA-SCNC: 12 MMOL/L (ref 22–26)
HCO3 BLDA-SCNC: 8 MMOL/L (ref 22–26)
HCT VFR BLD AUTO: 19.6 % (ref 36.6–50.3)
HCT VFR BLD AUTO: 22.5 % (ref 36.6–50.3)
HCT VFR BLD AUTO: 23.4 % (ref 36.6–50.3)
HCT VFR BLD AUTO: 25.6 % (ref 36.6–50.3)
HGB BLD-MCNC: 6.8 G/DL (ref 12.1–17)
HGB BLD-MCNC: 7.2 G/DL (ref 12.1–17)
HGB BLD-MCNC: 7.7 G/DL (ref 12.1–17)
HGB BLD-MCNC: 8.1 G/DL (ref 12.1–17)
HGB UR QL STRIP: ABNORMAL
HIGH TARGET, HITG: 250 MG/DL
HISTORY CHECKED?,CKHIST: NORMAL
IMM GRANULOCYTES # BLD AUTO: 0.2 K/UL (ref 0–0.04)
IMM GRANULOCYTES NFR BLD AUTO: 1 % (ref 0–0.5)
INSULIN ADMINSTERED, INSADM: 0 UNITS/HOUR
INSULIN ADMINSTERED, INSADM: 0.3 UNITS/HOUR
INSULIN ADMINSTERED, INSADM: 1 UNITS/HOUR
INSULIN ADMINSTERED, INSADM: 12.6 UNITS/HOUR
INSULIN ADMINSTERED, INSADM: 16.2 UNITS/HOUR
INSULIN ADMINSTERED, INSADM: 21.6 UNITS/HOUR
INSULIN ADMINSTERED, INSADM: 22.1 UNITS/HOUR
INSULIN ADMINSTERED, INSADM: 27 UNITS/HOUR
INSULIN ADMINSTERED, INSADM: 3 UNITS/HOUR
INSULIN ADMINSTERED, INSADM: 30.1 UNITS/HOUR
INSULIN ADMINSTERED, INSADM: 30.5 UNITS/HOUR
INSULIN ADMINSTERED, INSADM: 35.8 UNITS/HOUR
INSULIN ADMINSTERED, INSADM: 36.4 UNITS/HOUR
INSULIN ADMINSTERED, INSADM: 36.7 UNITS/HOUR
INSULIN ADMINSTERED, INSADM: 37.2 UNITS/HOUR
INSULIN ADMINSTERED, INSADM: 38.3 UNITS/HOUR
INSULIN ADMINSTERED, INSADM: 41.5 UNITS/HOUR
INSULIN ADMINSTERED, INSADM: 42.6 UNITS/HOUR
INSULIN ADMINSTERED, INSADM: 5.5 UNITS/HOUR
INSULIN ORDER, INSORD: 0 UNITS/HOUR
INSULIN ORDER, INSORD: 0.3 UNITS/HOUR
INSULIN ORDER, INSORD: 1 UNITS/HOUR
INSULIN ORDER, INSORD: 12.6 UNITS/HOUR
INSULIN ORDER, INSORD: 16.2 UNITS/HOUR
INSULIN ORDER, INSORD: 21.6 UNITS/HOUR
INSULIN ORDER, INSORD: 22.1 UNITS/HOUR
INSULIN ORDER, INSORD: 27 UNITS/HOUR
INSULIN ORDER, INSORD: 3 UNITS/HOUR
INSULIN ORDER, INSORD: 30.1 UNITS/HOUR
INSULIN ORDER, INSORD: 30.5 UNITS/HOUR
INSULIN ORDER, INSORD: 35.8 UNITS/HOUR
INSULIN ORDER, INSORD: 36.4 UNITS/HOUR
INSULIN ORDER, INSORD: 36.7 UNITS/HOUR
INSULIN ORDER, INSORD: 37.2 UNITS/HOUR
INSULIN ORDER, INSORD: 38.3 UNITS/HOUR
INSULIN ORDER, INSORD: 41.5 UNITS/HOUR
INSULIN ORDER, INSORD: 42.6 UNITS/HOUR
INSULIN ORDER, INSORD: 5.5 UNITS/HOUR
KETONES UR QL STRIP.AUTO: 15 MG/DL
LEUKOCYTE ESTERASE UR QL STRIP.AUTO: NEGATIVE
LOW TARGET, LOT: 150 MG/DL
LYMPHOCYTES # BLD: 1.7 K/UL (ref 0.8–3.5)
LYMPHOCYTES NFR BLD: 14 % (ref 12–49)
MAGNESIUM SERPL-MCNC: 2.7 MG/DL (ref 1.6–2.4)
MAGNESIUM SERPL-MCNC: 2.9 MG/DL (ref 1.6–2.4)
MCH RBC QN AUTO: 28.6 PG (ref 26–34)
MCH RBC QN AUTO: 28.9 PG (ref 26–34)
MCH RBC QN AUTO: 29 PG (ref 26–34)
MCH RBC QN AUTO: 29.2 PG (ref 26–34)
MCHC RBC AUTO-ENTMCNC: 30.1 G/DL (ref 30–36.5)
MCHC RBC AUTO-ENTMCNC: 32 G/DL (ref 30–36.5)
MCHC RBC AUTO-ENTMCNC: 34.6 G/DL (ref 30–36.5)
MCHC RBC AUTO-ENTMCNC: 34.7 G/DL (ref 30–36.5)
MCV RBC AUTO: 83.6 FL (ref 80–99)
MCV RBC AUTO: 84.1 FL (ref 80–99)
MCV RBC AUTO: 90.7 FL (ref 80–99)
MCV RBC AUTO: 95.2 FL (ref 80–99)
METHADONE UR QL: NEGATIVE
MINUTES UNTIL NEXT BG, NBG: 15 MIN
MINUTES UNTIL NEXT BG, NBG: 15 MIN
MINUTES UNTIL NEXT BG, NBG: 60 MIN
MONOCYTES # BLD: 0.6 K/UL (ref 0–1)
MONOCYTES NFR BLD: 5 % (ref 5–13)
MULTIPLIER, MUL: 0
MULTIPLIER, MUL: 0.01
MULTIPLIER, MUL: 0.02
MULTIPLIER, MUL: 0.03
MULTIPLIER, MUL: 0.04
MULTIPLIER, MUL: 0.05
MULTIPLIER, MUL: 0.06
MULTIPLIER, MUL: 0.07
MULTIPLIER, MUL: 0.08
MULTIPLIER, MUL: 0.09
MULTIPLIER, MUL: 0.09
MULTIPLIER, MUL: 0.1
MULTIPLIER, MUL: 0.11
MULTIPLIER, MUL: 0.11
MULTIPLIER, MUL: 0.12
MULTIPLIER, MUL: 0.13
MULTIPLIER, MUL: 0.14
NEUTS SEG # BLD: 10 K/UL (ref 1.8–8)
NEUTS SEG NFR BLD: 80 % (ref 32–75)
NITRITE UR QL STRIP.AUTO: NEGATIVE
NRBC # BLD: 0 K/UL (ref 0–0.01)
NRBC # BLD: 0 K/UL (ref 0–0.01)
NRBC # BLD: 0.02 K/UL (ref 0–0.01)
NRBC # BLD: 0.03 K/UL (ref 0–0.01)
NRBC BLD-RTO: 0 PER 100 WBC
NRBC BLD-RTO: 0 PER 100 WBC
NRBC BLD-RTO: 0.2 PER 100 WBC
NRBC BLD-RTO: 0.3 PER 100 WBC
OPIATES UR QL: NEGATIVE
ORDER INITIALS, ORDINIT: NORMAL
OSMOLALITY SERPL: 335 MOSM/KG H2O
P-R INTERVAL, ECG05: 170 MS
PCO2 BLD: 41.3 MMHG (ref 35–45)
PCO2 BLDA: 26 MMHG (ref 35–45)
PCO2 BLDA: 27 MMHG (ref 35–45)
PCP UR QL: NEGATIVE
PEEP RESPIRATORY: 5 CM[H2O]
PEEP RESPIRATORY: 5 CM[H2O]
PH BLD: 6.88 [PH] (ref 7.35–7.45)
PH BLDA: 7.09 [PH] (ref 7.35–7.45)
PH BLDA: 7.27 [PH] (ref 7.35–7.45)
PH UR STRIP: 5.5 [PH] (ref 5–8)
PHOSPHATE SERPL-MCNC: 7.5 MG/DL (ref 2.6–4.7)
PHOSPHATE SERPL-MCNC: 9.1 MG/DL (ref 2.6–4.7)
PLATELET # BLD AUTO: 352 K/UL (ref 150–400)
PLATELET # BLD AUTO: 376 K/UL (ref 150–400)
PLATELET # BLD AUTO: 384 K/UL (ref 150–400)
PLATELET # BLD AUTO: 407 K/UL (ref 150–400)
PMV BLD AUTO: 10.6 FL (ref 8.9–12.9)
PMV BLD AUTO: 10.8 FL (ref 8.9–12.9)
PMV BLD AUTO: 10.9 FL (ref 8.9–12.9)
PMV BLD AUTO: 11 FL (ref 8.9–12.9)
PO2 BLD: 88 MMHG (ref 80–100)
PO2 BLDA: 276 MMHG (ref 80–100)
PO2 BLDA: 84 MMHG (ref 80–100)
POTASSIUM SERPL-SCNC: 2.9 MMOL/L (ref 3.5–5.1)
POTASSIUM SERPL-SCNC: 3 MMOL/L (ref 3.5–5.1)
POTASSIUM SERPL-SCNC: 3.1 MMOL/L (ref 3.5–5.1)
POTASSIUM SERPL-SCNC: 3.3 MMOL/L (ref 3.5–5.1)
POTASSIUM SERPL-SCNC: 4.3 MMOL/L (ref 3.5–5.1)
POTASSIUM SERPL-SCNC: 5.2 MMOL/L (ref 3.5–5.1)
POTASSIUM SERPL-SCNC: 5.9 MMOL/L (ref 3.5–5.1)
PROT SERPL-MCNC: 5.5 G/DL (ref 6.4–8.2)
PROT UR STRIP-MCNC: 300 MG/DL
Q-T INTERVAL, ECG07: 552 MS
QRS DURATION, ECG06: 102 MS
QTC CALCULATION (BEZET), ECG08: 498 MS
RBC # BLD AUTO: 2.33 M/UL (ref 4.1–5.7)
RBC # BLD AUTO: 2.48 M/UL (ref 4.1–5.7)
RBC # BLD AUTO: 2.69 M/UL (ref 4.1–5.7)
RBC # BLD AUTO: 2.8 M/UL (ref 4.1–5.7)
RBC #/AREA URNS HPF: ABNORMAL /HPF (ref 0–5)
SAO2 % BLD: 86.2 % (ref 92–97)
SAO2 % BLD: 95 % (ref 92–97)
SAO2 % BLD: 99 % (ref 92–97)
SAO2% DEVICE SAO2% SENSOR NAME: ABNORMAL
SAO2% DEVICE SAO2% SENSOR NAME: ABNORMAL
SERVICE CMNT-IMP: ABNORMAL
SERVICE CMNT-IMP: NORMAL
SODIUM SERPL-SCNC: 132 MMOL/L (ref 136–145)
SODIUM SERPL-SCNC: 136 MMOL/L (ref 136–145)
SODIUM SERPL-SCNC: 136 MMOL/L (ref 136–145)
SODIUM SERPL-SCNC: 140 MMOL/L (ref 136–145)
SODIUM SERPL-SCNC: 144 MMOL/L (ref 136–145)
SODIUM SERPL-SCNC: 144 MMOL/L (ref 136–145)
SODIUM SERPL-SCNC: 145 MMOL/L (ref 136–145)
SOURCE, COVRS: NORMAL
SP GR UR REFRACTOMETRY: 1.02
SPECIMEN SITE: ABNORMAL
SPECIMEN SITE: ABNORMAL
SPECIMEN TYPE: ABNORMAL
TROPONIN-HIGH SENSITIVITY: 253 NG/L (ref 0–76)
TROPONIN-HIGH SENSITIVITY: 326 NG/L (ref 0–76)
TROPONIN-HIGH SENSITIVITY: 700 NG/L (ref 0–76)
TROPONIN-HIGH SENSITIVITY: 883 NG/L (ref 0–76)
TSH SERPL DL<=0.05 MIU/L-ACNC: 4.3 UIU/ML (ref 0.36–3.74)
TSH SERPL DL<=0.05 MIU/L-ACNC: 7.28 UIU/ML (ref 0.36–3.74)
UA: UC IF INDICATED,UAUC: ABNORMAL
UROBILINOGEN UR QL STRIP.AUTO: 0.2 EU/DL (ref 0.2–1)
VENTILATION MODE VENT: ABNORMAL
VENTILATION MODE VENT: ABNORMAL
VENTRICULAR RATE, ECG03: 49 BPM
VT SETTING VENT: 500 ML
VT SETTING VENT: 500 ML
WBC # BLD AUTO: 10.9 K/UL (ref 4.1–11.1)
WBC # BLD AUTO: 10.9 K/UL (ref 4.1–11.1)
WBC # BLD AUTO: 12.6 K/UL (ref 4.1–11.1)
WBC # BLD AUTO: 9.3 K/UL (ref 4.1–11.1)
WBC URNS QL MICRO: ABNORMAL /HPF (ref 0–4)

## 2022-08-12 PROCEDURE — 81001 URINALYSIS AUTO W/SCOPE: CPT

## 2022-08-12 PROCEDURE — 36430 TRANSFUSION BLD/BLD COMPNT: CPT

## 2022-08-12 PROCEDURE — 84100 ASSAY OF PHOSPHORUS: CPT

## 2022-08-12 PROCEDURE — 74011250636 HC RX REV CODE- 250/636: Performed by: EMERGENCY MEDICINE

## 2022-08-12 PROCEDURE — 31500 INSERT EMERGENCY AIRWAY: CPT

## 2022-08-12 PROCEDURE — 74018 RADEX ABDOMEN 1 VIEW: CPT

## 2022-08-12 PROCEDURE — 74011636637 HC RX REV CODE- 636/637

## 2022-08-12 PROCEDURE — 80048 BASIC METABOLIC PNL TOTAL CA: CPT

## 2022-08-12 PROCEDURE — 74011000258 HC RX REV CODE- 258

## 2022-08-12 PROCEDURE — 82962 GLUCOSE BLOOD TEST: CPT

## 2022-08-12 PROCEDURE — 77030005513 HC CATH URETH FOL11 MDII -B

## 2022-08-12 PROCEDURE — 74011000250 HC RX REV CODE- 250: Performed by: EMERGENCY MEDICINE

## 2022-08-12 PROCEDURE — 94003 VENT MGMT INPAT SUBQ DAY: CPT

## 2022-08-12 PROCEDURE — 74011000250 HC RX REV CODE- 250

## 2022-08-12 PROCEDURE — P9016 RBC LEUKOCYTES REDUCED: HCPCS

## 2022-08-12 PROCEDURE — 83735 ASSAY OF MAGNESIUM: CPT

## 2022-08-12 PROCEDURE — 99232 SBSQ HOSP IP/OBS MODERATE 35: CPT

## 2022-08-12 PROCEDURE — 82803 BLOOD GASES ANY COMBINATION: CPT

## 2022-08-12 PROCEDURE — 84484 ASSAY OF TROPONIN QUANT: CPT

## 2022-08-12 PROCEDURE — 74011000250 HC RX REV CODE- 250: Performed by: NURSE PRACTITIONER

## 2022-08-12 PROCEDURE — 74011636637 HC RX REV CODE- 636/637: Performed by: EMERGENCY MEDICINE

## 2022-08-12 PROCEDURE — 71045 X-RAY EXAM CHEST 1 VIEW: CPT

## 2022-08-12 PROCEDURE — C9113 INJ PANTOPRAZOLE SODIUM, VIA: HCPCS | Performed by: NURSE PRACTITIONER

## 2022-08-12 PROCEDURE — 5A1945Z RESPIRATORY VENTILATION, 24-96 CONSECUTIVE HOURS: ICD-10-PCS | Performed by: INTERNAL MEDICINE

## 2022-08-12 PROCEDURE — 84443 ASSAY THYROID STIM HORMONE: CPT

## 2022-08-12 PROCEDURE — 80307 DRUG TEST PRSMV CHEM ANLYZR: CPT

## 2022-08-12 PROCEDURE — 74011000258 HC RX REV CODE- 258: Performed by: NURSE PRACTITIONER

## 2022-08-12 PROCEDURE — 30233N1 TRANSFUSION OF NONAUTOLOGOUS RED BLOOD CELLS INTO PERIPHERAL VEIN, PERCUTANEOUS APPROACH: ICD-10-PCS | Performed by: INTERNAL MEDICINE

## 2022-08-12 PROCEDURE — 74011250636 HC RX REV CODE- 250/636: Performed by: ANESTHESIOLOGY

## 2022-08-12 PROCEDURE — 83036 HEMOGLOBIN GLYCOSYLATED A1C: CPT

## 2022-08-12 PROCEDURE — 83930 ASSAY OF BLOOD OSMOLALITY: CPT

## 2022-08-12 PROCEDURE — 96375 TX/PRO/DX INJ NEW DRUG ADDON: CPT

## 2022-08-12 PROCEDURE — 74011000258 HC RX REV CODE- 258: Performed by: EMERGENCY MEDICINE

## 2022-08-12 PROCEDURE — 96376 TX/PRO/DX INJ SAME DRUG ADON: CPT

## 2022-08-12 PROCEDURE — 94002 VENT MGMT INPAT INIT DAY: CPT

## 2022-08-12 PROCEDURE — 74011250636 HC RX REV CODE- 250/636: Performed by: NURSE PRACTITIONER

## 2022-08-12 PROCEDURE — 85027 COMPLETE CBC AUTOMATED: CPT

## 2022-08-12 PROCEDURE — 74011250637 HC RX REV CODE- 250/637: Performed by: HOSPITALIST

## 2022-08-12 PROCEDURE — 74011250637 HC RX REV CODE- 250/637: Performed by: NURSE PRACTITIONER

## 2022-08-12 PROCEDURE — P9045 ALBUMIN (HUMAN), 5%, 250 ML: HCPCS | Performed by: NURSE PRACTITIONER

## 2022-08-12 PROCEDURE — 36600 WITHDRAWAL OF ARTERIAL BLOOD: CPT

## 2022-08-12 PROCEDURE — 0BH17EZ INSERTION OF ENDOTRACHEAL AIRWAY INTO TRACHEA, VIA NATURAL OR ARTIFICIAL OPENING: ICD-10-PCS | Performed by: INTERNAL MEDICINE

## 2022-08-12 PROCEDURE — 86900 BLOOD TYPING SEROLOGIC ABO: CPT

## 2022-08-12 PROCEDURE — 36415 COLL VENOUS BLD VENIPUNCTURE: CPT

## 2022-08-12 PROCEDURE — 82947 ASSAY GLUCOSE BLOOD QUANT: CPT

## 2022-08-12 PROCEDURE — 65620000000 HC RM CCU GENERAL

## 2022-08-12 RX ORDER — SODIUM BICARBONATE 1 MEQ/ML
50 SYRINGE (ML) INTRAVENOUS
Status: COMPLETED | OUTPATIENT
Start: 2022-08-12 | End: 2022-08-12

## 2022-08-12 RX ORDER — LEVOFLOXACIN 5 MG/ML
500 INJECTION, SOLUTION INTRAVENOUS
Status: DISCONTINUED | OUTPATIENT
Start: 2022-08-14 | End: 2022-08-12

## 2022-08-12 RX ORDER — SODIUM CHLORIDE 0.9 % (FLUSH) 0.9 %
5-40 SYRINGE (ML) INJECTION AS NEEDED
Status: DISCONTINUED | OUTPATIENT
Start: 2022-08-12 | End: 2022-08-20 | Stop reason: HOSPADM

## 2022-08-12 RX ORDER — ALBUMIN HUMAN 50 G/1000ML
25 SOLUTION INTRAVENOUS ONCE
Status: COMPLETED | OUTPATIENT
Start: 2022-08-12 | End: 2022-08-12

## 2022-08-12 RX ORDER — SODIUM CHLORIDE 9 MG/ML
150 INJECTION, SOLUTION INTRAVENOUS CONTINUOUS
Status: DISCONTINUED | OUTPATIENT
Start: 2022-08-12 | End: 2022-08-12

## 2022-08-12 RX ORDER — FENTANYL CITRATE 50 UG/ML
50 INJECTION, SOLUTION INTRAMUSCULAR; INTRAVENOUS
Status: DISCONTINUED | OUTPATIENT
Start: 2022-08-12 | End: 2022-08-13

## 2022-08-12 RX ORDER — LEVOFLOXACIN 5 MG/ML
500 INJECTION, SOLUTION INTRAVENOUS
Status: DISCONTINUED | OUTPATIENT
Start: 2022-08-14 | End: 2022-08-15

## 2022-08-12 RX ORDER — SODIUM CHLORIDE 0.9 % (FLUSH) 0.9 %
5-40 SYRINGE (ML) INJECTION EVERY 8 HOURS
Status: DISCONTINUED | OUTPATIENT
Start: 2022-08-12 | End: 2022-08-20 | Stop reason: HOSPADM

## 2022-08-12 RX ORDER — ONDANSETRON 4 MG/1
4 TABLET, ORALLY DISINTEGRATING ORAL
Status: DISCONTINUED | OUTPATIENT
Start: 2022-08-12 | End: 2022-08-13

## 2022-08-12 RX ORDER — POLYETHYLENE GLYCOL 3350 17 G/17G
17 POWDER, FOR SOLUTION ORAL DAILY PRN
Status: DISCONTINUED | OUTPATIENT
Start: 2022-08-12 | End: 2022-08-20 | Stop reason: HOSPADM

## 2022-08-12 RX ORDER — NOREPINEPHRINE BITARTRATE/D5W 8 MG/250ML
2-100 PLASTIC BAG, INJECTION (ML) INTRAVENOUS
Status: DISCONTINUED | OUTPATIENT
Start: 2022-08-12 | End: 2022-08-12

## 2022-08-12 RX ORDER — LEVOFLOXACIN 5 MG/ML
750 INJECTION, SOLUTION INTRAVENOUS
Status: DISCONTINUED | OUTPATIENT
Start: 2022-08-12 | End: 2022-08-12

## 2022-08-12 RX ORDER — DEXTROSE MONOHYDRATE 100 MG/ML
INJECTION, SOLUTION INTRAVENOUS
Status: DISPENSED
Start: 2022-08-12 | End: 2022-08-13

## 2022-08-12 RX ORDER — ONDANSETRON 2 MG/ML
4 INJECTION INTRAMUSCULAR; INTRAVENOUS
Status: DISCONTINUED | OUTPATIENT
Start: 2022-08-12 | End: 2022-08-20 | Stop reason: HOSPADM

## 2022-08-12 RX ORDER — FENTANYL CITRATE 50 UG/ML
50 INJECTION, SOLUTION INTRAMUSCULAR; INTRAVENOUS ONCE
Status: COMPLETED | OUTPATIENT
Start: 2022-08-13 | End: 2022-08-12

## 2022-08-12 RX ORDER — SODIUM BICARBONATE 1 MEQ/ML
SYRINGE (ML) INTRAVENOUS
Status: COMPLETED
Start: 2022-08-12 | End: 2022-08-12

## 2022-08-12 RX ORDER — ACETAMINOPHEN 325 MG/1
650 TABLET ORAL
Status: DISCONTINUED | OUTPATIENT
Start: 2022-08-12 | End: 2022-08-20 | Stop reason: HOSPADM

## 2022-08-12 RX ORDER — PROPOFOL 10 MG/ML
0-50 VIAL (ML) INTRAVENOUS
Status: DISCONTINUED | OUTPATIENT
Start: 2022-08-12 | End: 2022-08-13

## 2022-08-12 RX ORDER — DEXTROSE MONOHYDRATE AND SODIUM CHLORIDE 5; .9 G/100ML; G/100ML
75 INJECTION, SOLUTION INTRAVENOUS CONTINUOUS
Status: DISPENSED | OUTPATIENT
Start: 2022-08-12 | End: 2022-08-13

## 2022-08-12 RX ORDER — CALCIUM GLUCONATE 94 MG/ML
1 INJECTION, SOLUTION INTRAVENOUS
Status: COMPLETED | OUTPATIENT
Start: 2022-08-12 | End: 2022-08-12

## 2022-08-12 RX ORDER — CHLORHEXIDINE GLUCONATE 0.12 MG/ML
15 RINSE ORAL EVERY 12 HOURS
Status: DISCONTINUED | OUTPATIENT
Start: 2022-08-12 | End: 2022-08-20 | Stop reason: HOSPADM

## 2022-08-12 RX ORDER — SODIUM CHLORIDE 9 MG/ML
250 INJECTION, SOLUTION INTRAVENOUS AS NEEDED
Status: DISCONTINUED | OUTPATIENT
Start: 2022-08-12 | End: 2022-08-20 | Stop reason: HOSPADM

## 2022-08-12 RX ORDER — BALSAM PERU/CASTOR OIL
OINTMENT (GRAM) TOPICAL EVERY 12 HOURS
Status: DISCONTINUED | OUTPATIENT
Start: 2022-08-12 | End: 2022-08-20 | Stop reason: HOSPADM

## 2022-08-12 RX ORDER — ACETAMINOPHEN 650 MG/1
650 SUPPOSITORY RECTAL
Status: DISCONTINUED | OUTPATIENT
Start: 2022-08-12 | End: 2022-08-20 | Stop reason: HOSPADM

## 2022-08-12 RX ADMIN — SODIUM BICARBONATE: 84 INJECTION, SOLUTION INTRAVENOUS at 07:06

## 2022-08-12 RX ADMIN — SODIUM BICARBONATE 50 MEQ: 84 INJECTION INTRAVENOUS at 00:31

## 2022-08-12 RX ADMIN — SODIUM CHLORIDE 150 ML/HR: 9 INJECTION, SOLUTION INTRAVENOUS at 02:12

## 2022-08-12 RX ADMIN — CEFEPIME 1 G: 1 INJECTION, POWDER, FOR SOLUTION INTRAMUSCULAR; INTRAVENOUS at 22:36

## 2022-08-12 RX ADMIN — FENTANYL CITRATE 50 MCG: 50 INJECTION, SOLUTION INTRAMUSCULAR; INTRAVENOUS at 23:53

## 2022-08-12 RX ADMIN — ACETAMINOPHEN 650 MG: 325 TABLET ORAL at 20:22

## 2022-08-12 RX ADMIN — CHLORHEXIDINE GLUCONATE 15 ML: 1.2 RINSE ORAL at 20:33

## 2022-08-12 RX ADMIN — PROPOFOL 30 MCG/KG/MIN: 10 INJECTION, EMULSION INTRAVENOUS at 08:47

## 2022-08-12 RX ADMIN — DEXTROSE AND SODIUM CHLORIDE 75 ML/HR: 5; 900 INJECTION, SOLUTION INTRAVENOUS at 19:03

## 2022-08-12 RX ADMIN — DEXTROSE MONOHYDRATE 75 ML: 100 INJECTION, SOLUTION INTRAVENOUS at 17:36

## 2022-08-12 RX ADMIN — ALBUMIN (HUMAN) 25 G: 12.5 INJECTION, SOLUTION INTRAVENOUS at 02:57

## 2022-08-12 RX ADMIN — CALCIUM GLUCONATE 1 G: 98 INJECTION, SOLUTION INTRAVENOUS at 02:56

## 2022-08-12 RX ADMIN — CASTOR OIL AND BALSAM, PERU: 788; 87 OINTMENT TOPICAL at 20:31

## 2022-08-12 RX ADMIN — PROPOFOL 30 MCG/KG/MIN: 10 INJECTION, EMULSION INTRAVENOUS at 14:02

## 2022-08-12 RX ADMIN — PROPOFOL 50 MCG/KG/MIN: 10 INJECTION, EMULSION INTRAVENOUS at 23:22

## 2022-08-12 RX ADMIN — SODIUM BICARBONATE 50 MEQ: 84 INJECTION INTRAVENOUS at 00:20

## 2022-08-12 RX ADMIN — SODIUM CHLORIDE, PRESERVATIVE FREE 10 ML: 5 INJECTION INTRAVENOUS at 22:01

## 2022-08-12 RX ADMIN — SODIUM CHLORIDE 40 MG: 9 INJECTION INTRAMUSCULAR; INTRAVENOUS; SUBCUTANEOUS at 08:52

## 2022-08-12 RX ADMIN — SODIUM CHLORIDE 1000 ML: 9 INJECTION, SOLUTION INTRAVENOUS at 00:57

## 2022-08-12 RX ADMIN — Medication 16.2 UNITS/HR: at 00:56

## 2022-08-12 RX ADMIN — PROPOFOL 40 MCG/KG/MIN: 10 INJECTION, EMULSION INTRAVENOUS at 19:58

## 2022-08-12 RX ADMIN — Medication 1 AMPULE: at 20:44

## 2022-08-12 RX ADMIN — SODIUM CHLORIDE, PRESERVATIVE FREE 10 ML: 5 INJECTION INTRAVENOUS at 02:16

## 2022-08-12 RX ADMIN — SODIUM CHLORIDE 1000 ML: 9 INJECTION, SOLUTION INTRAVENOUS at 11:07

## 2022-08-12 RX ADMIN — DEXTROSE MONOHYDRATE 75 ML: 100 INJECTION, SOLUTION INTRAVENOUS at 18:33

## 2022-08-12 RX ADMIN — FENTANYL CITRATE 50 MCG: 50 INJECTION, SOLUTION INTRAMUSCULAR; INTRAVENOUS at 21:27

## 2022-08-12 RX ADMIN — SODIUM CHLORIDE, PRESERVATIVE FREE 10 ML: 5 INJECTION INTRAVENOUS at 14:02

## 2022-08-12 RX ADMIN — PROPOFOL 30 MCG/KG/MIN: 10 INJECTION, EMULSION INTRAVENOUS at 02:13

## 2022-08-12 RX ADMIN — SODIUM BICARBONATE: 84 INJECTION, SOLUTION INTRAVENOUS at 14:01

## 2022-08-12 RX ADMIN — Medication 27 UNITS/HR: at 04:14

## 2022-08-12 RX ADMIN — SODIUM BICARBONATE 50 MEQ: 84 INJECTION INTRAVENOUS at 00:28

## 2022-08-12 RX ADMIN — Medication 36.7 UNITS/HR: at 07:42

## 2022-08-12 RX ADMIN — SODIUM CHLORIDE, PRESERVATIVE FREE 10 ML: 5 INJECTION INTRAVENOUS at 06:11

## 2022-08-12 RX ADMIN — Medication 35.9 UNITS/HR: at 05:27

## 2022-08-12 RX ADMIN — Medication 36.4 UNITS/HR: at 12:50

## 2022-08-12 RX ADMIN — Medication 41.5 UNITS/HR: at 10:00

## 2022-08-12 RX ADMIN — CASTOR OIL AND BALSAM, PERU: 788; 87 OINTMENT TOPICAL at 15:56

## 2022-08-12 RX ADMIN — CEFEPIME 1 G: 1 INJECTION, POWDER, FOR SOLUTION INTRAMUSCULAR; INTRAVENOUS at 11:57

## 2022-08-12 RX ADMIN — CHLORHEXIDINE GLUCONATE 15 ML: 1.2 RINSE ORAL at 03:00

## 2022-08-12 RX ADMIN — CHLORHEXIDINE GLUCONATE 15 ML: 1.2 RINSE ORAL at 08:02

## 2022-08-12 RX ADMIN — Medication 0.3 UNITS/HR: at 18:51

## 2022-08-12 RX ADMIN — SODIUM CHLORIDE 40 MG: 9 INJECTION INTRAMUSCULAR; INTRAVENOUS; SUBCUTANEOUS at 20:26

## 2022-08-12 RX ADMIN — LEVOFLOXACIN 750 MG: 5 INJECTION, SOLUTION INTRAVENOUS at 00:35

## 2022-08-12 NOTE — PROGRESS NOTES
0200 Bedside and Verbal shift change report given to Leisa Day (oncoming nurse) by Gen Pelaez  (offgoing nurse). Report included the following information ED Summary, Intake/Output, Recent Results, Med Rec Status, Cardiac Rhythm normal sinus , and Alarm Parameters .      0215 pt picked up from the Ed, pt is intubated with vent settings acvc r 24  fio2 80% peep of 5 pt is a rass -2, pt temp was 91 pt is on a fritz hugger, itrace done pt is on insulin drip , ns at 150, pt b/p is stable at the moment, insulin has been titrated as ordered     0400 labs sent awaiting results   0500 critical called in pt troponin, blood glucose, co2 af 9 icu NP notified no new orders at this time    0700 report given to the oncoming nurse

## 2022-08-12 NOTE — ED NOTES
Siena SWANN at bedside   Sodium bicarb 50meq at 0020  Calcium gluconate 1000mg at 0020    Respiratory at bedside 0020  Push dose Epi at [facility]

## 2022-08-12 NOTE — CONSULTS
Nephrology Progress Note  [facility] / [address] [place], [address]  Phone - [phone]  Fax - [phone]                 Patient: Cory White                   YOB: 1980        Date- 8/12/2022                      Admit Date: 8/11/2022  CC: Follow up for DKA, FELECIA          IMPRESSION & PLAN:   FELECIA stage III on CKD stage IV(secondary to intravenous volume depletion)  CKD stage IV(baseline creatinine 2.8-3.5)(secondary to uncontrolled diabetes)  Anion gap metabolic acidosis sec to DKA  Non-anion gap metabolic acidosis  Diabetic ketoacidosis  Type 1 diabetes uncontrolled  Hyperphosphatemia  Elevated troponins  Community-acquired pneumonia  Acute hypoxic respiratory failure on mechanical ventilation    PLAN-  Can switch bicarb gtt. to normal saline. Blood gas already started to improve with improving lactic acid levels. Continue with IV insulin per protocol for DKA. Not much change in serum creatinine for now. Suspect patient is getting very close to hemodialysis. We will monitor for now. No acute need for RRT at this time from laboratory standpoint. His acidosis is getting corrected  He is nonoliguric with 1.2 L urine output. Already received 2 L of IV fluid boluses. Continue check BMP every 4 hours. Might need cardiology evaluation for elevated troponins. Thank you for the consult     Subjective: Interval History:   -Patient was seen and examined today. He is history of CKD stage IV with a baseline creatinine of 2.8-3.5 also history of type 1 diabetes, bipolar disorder who presented to the ED with complaints of acute hypoxic respiratory failure in setting of severe diabetic ketoacidosis and community-acquired pneumonia. He was admitted to ICU was placed on mechanical ventilation and was placed on DKA protocol. He received 3 L of IV fluids with boluses.   He is on IV antibiotics for pneumonia. He was significantly acidotic and had ongoing FELECIA. Renal consult is requested for evaluation of FELECIA on CKD. Objective:   Vitals:    08/12/22 1000 08/12/22 1100 08/12/22 1200 08/12/22 1202   BP: (!) 140/73 133/73 (!) 142/83    Pulse: 86 86 84 85   Resp: 24 24 24 24   Temp: 99 °F (37.2 °C) 98.8 °F (37.1 °C) 98.2 °F (36.8 °C)    SpO2: 100% 100% 100% 100%   Weight:       Height:          08/11 0701 - 08/12 0700  In: 825.4 [I.V.:825.4]  Out: 1100 [Urine:1100]  Last 3 Recorded Weights in this Encounter    08/11/22 2320 08/12/22 0400   Weight: 82.6 kg (182 lb) 84.7 kg (186 lb 11.7 oz)      Physical exam:    GEN: Intubated and sedated  NECK- Supple, no mass  RESP: No wheezing, Clear b/l  CVS: Tachycardia  NEURO: Sedated  EXT: No Edema       Chart reviewed. Pertinent Notes reviewed. Data Review :  Recent Labs     08/12/22  1019 08/12/22  0346 08/12/22  0246 08/12/22  0041    136  --  136   K 3.1* 4.3  --  5.2*    105  --  104   CO2 17* 9*  --  10*   BUN 81* 82*  --  82*   CREA 5.70* 5.44*  --  5.58*   * 646* 698* 756*   CA 7.9* 7.8*  --  8.0*   MG  --  2.7*  --  2.9*   PHOS  --  7.5*  --  9.1*     Recent Labs     08/12/22  1015 08/12/22  0346 08/11/22  2332   WBC 9.3 10.9 12.6*   HGB 6.8* 7.2* 7.7*   HCT 19.6* 22.5* 25.6*    352 407*     No results for input(s): FE, TIBC, PSAT, FERR in the last 72 hours.    Medication list  reviewed  Current Facility-Administered Medications   Medication Dose Route Frequency    propofol (DIPRIVAN) 10 mg/mL infusion  0-50 mcg/kg/min IntraVENous TITRATE    NOREPINephrine (LEVOPHED) 8 mg in 5% dextrose 250mL (32 mcg/mL) infusion  2-100 mcg/min IntraVENous TITRATE    sodium chloride (NS) flush 5-40 mL  5-40 mL IntraVENous Q8H    sodium chloride (NS) flush 5-40 mL  5-40 mL IntraVENous PRN    acetaminophen (TYLENOL) tablet 650 mg  650 mg Oral Q6H PRN    Or    acetaminophen (TYLENOL) suppository 650 mg  650 mg Rectal Q6H PRN    polyethylene glycol (MIRALAX) packet 17 g  17 g Oral DAILY PRN    ondansetron (ZOFRAN ODT) tablet 4 mg  4 mg Oral Q8H PRN    Or    ondansetron (ZOFRAN) injection 4 mg  4 mg IntraVENous Q6H PRN    chlorhexidine (ORAL CARE KIT) 0.12 % mouthwash 15 mL  15 mL Oral Q12H    [Held by provider] dextrose 5% and 0.9% NaCl infusion  75 mL/hr IntraVENous CONTINUOUS    pantoprazole (PROTONIX) 40 mg in 0.9% sodium chloride 10 mL injection  40 mg IntraVENous Q12H    cefepime (MAXIPIME) 1 g in 0.9% sodium chloride (MBP/ADV) 50 mL MBP  1 g IntraVENous Q12H    [START ON 8/14/2022] levoFLOXacin (LEVAQUIN) 500 mg in D5W IVPB  500 mg IntraVENous Q48H    sodium bicarbonate (8.4%) 150 mEq in 0.45% sodium chloride 1,000 mL infusion   IntraVENous CONTINUOUS    0.9% sodium chloride infusion 250 mL  250 mL IntraVENous PRN    sodium chloride (NS) flush 5-10 mL  5-10 mL IntraVENous PRN    glucose chewable tablet 16 g  4 Tablet Oral PRN    glucagon (GLUCAGEN) injection 1 mg  1 mg IntraMUSCular PRN    dextrose 10% infusion 0-250 mL  0-250 mL IntraVENous PRN    insulin regular (NOVOLIN R, HUMULIN R) 100 Units in 0.9% sodium chloride 100 mL infusion  0-50 Units/hr IntraVENous TITRATE        Harshad Ying MD  8/12/2022

## 2022-08-12 NOTE — H&P
CRITICAL CARE NOTE      Name: Collins Logan   : 1980   MRN: [id number]   Date: 2022      REASON FOR ICU ADMISSION: Diabetes ketoacidosis, Acute Respiratory Failure    PRINCIPAL ICU DIAGNOSIS   Diabetes ketoacidosis  AG metabolic acidosis  Acute on chronic renal failure  Acute hypoxic respiratory failure    BRIEF PATIENT SUMMARY   47 y/o male history of poorly controlled T1DM, bipolar disorder, and CKD 3 admitted  for acute hypoxic respiratory failure in the setting of severe diabetic ketoacidosis. EMS called for generalized lethargy and increased work of breathing. Glucose > 600, VBG: PH 6.83,, 2 L NS administered, initially was maintaining his airway, then proceeded to have minimal respirations, obtunded, placed on mechanical ventilation. Insulin infusion initiated for DKA, antibiotics d/t concern for PNA.      COMPREHENSIVE ASSESSMENT & PLAN:SYSTEM BASED     24 HOUR EVENTS: Transferred to ICU on mechanical ventilation, insulin drip    NEUROLOGICAL: Hx of Bipolar Disorder, neuropathy   Sedated Propofol, PRN fentanyl, Goal Rass 0 to -1  Close neurological monitoring    PULMONOLOGY:   Acute respiratory failure requiring mechanical intubation -current,  in setting of DKA, Possible PNA  CXR: bilateral opacities concern for possible PNA  Optimize PEEP/Ventilation/Oxygenation in setting of DKA  SpO2 Goal: > 92%    CARDIOVASCULAR:   Elevated Troponin  Sinus Rhythm on Telemetry, on acute ST changes  Elevated Troponin likely d/t demand ischemia- trending  Levophed for goal map > 65    GASTROINTESTINAL: Hx of severe erosive esophagitis, gastric errosions   Recent EGD 22: Severe grade D erosive esophagitis, multiple gastric erosions  NPO   Protonix for GI Prophylaxis    RENAL/ELECTROLYTE/FLUIDS: Hx of CKD3 and urinary retention   Acute on chronic Renal Failure, AG Metabolic Acidosis in setting of DKA  FELECIA 2/2 volume depletion->Baseline Cr 2-2.5, Cr on admission 5.93  Serial BMP and electrolyte replacement, volume replacement- IV fluids    ENDOCRINE: Hx of Type 1 Diabetes   Diabetes ketoacidosis in poorly controlled Type 1 Diabetic ( f/b Dr. Sky Velasquez in insulin pump and Dexcom)  Fluid Resuscitation, Insulin infusion protocol  Hemoglobin A1c 12.1 (7/22) repeat -pending, TSH- pending  Diabetes Educator Consult  Blood Sugar Goal 120-180 - Glycemic Control: Insulin    HEMATOLOGY/ONCOLOGY:   Acute on chronic anemia in setting of chronic renal failure  No signs of active bleeding  SCDS for DVT prophylaxis    INFECTIOUS DISEASE:   Concern for possible PNA, continue empiric antibiotics    ANTIBIOTICS TO DATE:  Levaquin (8/12-current)  Cefepime (8/12-current)    CULTURES TO DATE:  (8/12) Blood Cultures- pending  (8/12) Urine Culture- pending    ICU DAILY CHECKLIST     Code Status:FULL  DVT Prophylaxis:SCD  T/L/D: Tubes: ETT and Orogastric Tube  Lines: Peripheral IV  Drains: Beard Catheter  SUP: Protonix  Diet: NPO  Activity Level:Bedrest  ABCDEF Bundle/Checklist Completed:Yes  Disposition: Stay in ICU  Multidisciplinary Rounds Completed:  Pending  Goals of Care Discussion/Palliative: Pending  Patient/Family Updated: [address]   Review of Systems   Unable to perform ROS: Intubated      OBJECTIVE     Labs and Data: Reviewed 08/12/22  Medications: Reviewed 08/12/22  Imaging: Reviewed 08/12/22    Physical Exam  Vitals reviewed. Constitutional:       Appearance: He is ill-appearing. Interventions: He is sedated and intubated. HENT:      Head: Normocephalic and atraumatic. Mouth/Throat:      Mouth: Mucous membranes are dry. Eyes:      Comments: Pupils 4->3.5 mm bilateral   Cardiovascular:      Rate and Rhythm: Normal rate and regular rhythm. Pulses: Normal pulses. Heart sounds: Normal heart sounds. Pulmonary:      Effort: He is intubated. Comments: Scattered coarse mechanical breath sounds  Abdominal:      General: Abdomen is flat.  Bowel sounds are normal.      Palpations: Abdomen is soft. Musculoskeletal:         General: Normal range of motion. Cervical back: Normal range of motion and neck supple. Right lower leg: Edema present. Left lower leg: Edema present. Comments: 1+ BLE edema   Skin:     General: Skin is warm and dry. Neurological:      Comments: Withdraws x 4 to noxious stimuli   Psychiatric:      Comments: On MV        Visit Vitals  BP (!) 73/41   Pulse (!) 34   Temp (!) 92.3 °F (33.5 °C)   Resp (!) 5   Ht 5' 9\" (1.753 m)   Wt 82.6 kg (182 lb)   SpO2 91%   BMI 26.88 kg/m²    O2 Flow Rate (L/min): 5 l/min O2 Device: Nasal cannula (nasal cannula started) Temp (24hrs), Av.3 °F (33.5 °C), Min:92.3 °F (33.5 °C), Max:92.3 °F (33.5 °C)           Intake/Output:   No intake or output data in the 24 hours ending 22 0040    Imaging    10/22/20    ECHO ADULT COMPLETE 10/23/2020 10/23/2020    Interpretation Summary  · LV: Estimated LVEF is 55 - 60%. Visually measured ejection fraction. Normal cavity size, wall thickness and systolic function (ejection fraction normal). · MV: Mild mitral valve regurgitation is present. Signed by: Perlita Rollins MD on 10/23/2020  5:40 PM         CRITICAL CARE DOCUMENTATION  I had a face to face encounter with the patient, reviewed and interpreted patient data including clinical events, labs, images, vital signs, I/O's, and examined patient. I have discussed the case and the plan and management of the patient's care with the consulting services, the bedside nurses and the respiratory therapist.      NOTE OF PERSONAL INVOLVEMENT IN CARE   This patient has a high probability of imminent, clinically significant deterioration, which requires the highest level of preparedness to intervene urgently.  I participated in the decision-making and personally managed or directed the management of the following life and organ supporting interventions that required my frequent assessment to treat or prevent imminent deterioration. I personally spent 85 minutes of critical care time. This is time spent at this critically ill patient's bedside actively involved in patient care as well as the coordination of care. This does not include any procedural time which has been billed separately.     Keagan Sorensen NP   Critical Care Medicine  [facility]

## 2022-08-12 NOTE — CONSULTS
[facility]  [address] [address]  [phone]  [address]       Date of  Admission: 8/11/2022 11:11 PM     Admission type:Emergency   Primary Care Physician:Manav Sen MD     Attending Provider: Agnes Oneal MD  Cardiology Provider:     PLEASE NOTE THAT WE CONFIRMED WITH THE REFERRING PHYSICIAN PRIOR TO SEEING THE PATIENT THAT THE PATIENT IS BEING REFERRED FOR INITIAL HOSPITAL EVALUATION AND FOR LONG-TERM ONGOING CARDIAC CARE    CC/REASON FOR CONSULT: elevated troponin      Subjective: Soledad Dior is a 46 y.o. male admitted for DKA (diabetic ketoacidosis) ([facility] [E11.10]. .  Patient is a 42-year-old male with a history of type 1 diabetes, chronic kidney disease was admitted for acute hypoxic respiratory failure, severe DKA. He was noted to have altered mental status upon EMS arrival he was hypoxic hyperglycemic. He was intubated in the ER started on fluid resuscitation and insulin drip.     Patient Active Problem List    Diagnosis Date Noted    Hyperglycemia due to diabetes mellitus ([address] 07/10/2022    Intractable nausea and vomiting 07/10/2022    Chronic kidney disease, stage 3a ([address]     UTI (urinary tract infection) 03/08/2022    Coffee ground emesis 02/09/2022    COVID-19 02/09/2022    DKA (diabetic ketoacidosis) ([address] 12/02/2021    Hyperkalemia 09/23/2021    Hypothermia 03/07/2020    Acidosis, metabolic 54/19/0062    DKA, type 1, not at goal [facility]) 03/07/2020    FELECIA (acute kidney injury) ([address] 01/02/2020    Hydronephrosis of right kidney 10/29/2019    DKA (diabetic ketoacidoses) 07/06/2019    DKA, type 1 ([address] 11/18/2018    Neuropathy 11/18/2018    Upper GI bleed 11/18/2018    Diabetic neuropathy, type I diabetes mellitus ([address] 02/02/2014    Tobacco abuse 08/18/2011    HTN (hypertension) 03/13/2011      Lacey Diaz MD  Past Medical History:   Diagnosis Date    Bipolar 1 disorder, depressed ([facility]     Bipolar disorder ([facility]     Chronic kidney disease, stage 3a ([facility]     Depression     Diabetes ([facility]     DKA, type 1 ([facility] 2013    diagnosed age 23    DKA, type 1 ([facility]     H/O noncompliance with medical treatment, presenting hazards to health     MRSA (methicillin resistant staph aureus) culture positive     MRSA (methicillin resistant Staphylococcus aureus)     Face    Noncompliance with medication regimen     Second hand smoke exposure     Seizure ([facility]     Seizures ([facility] 2006 or 2007    one episode during residential      Social History     Socioeconomic History    Marital status: SINGLE   Tobacco Use    Smoking status: Former     Packs/day: 0.10     Years: 16.00     Pack years: 1.60     Types: Cigarettes     Start date: 10/4/1999     Quit date: 2020     Years since quittin.4    Smokeless tobacco: Never   Vaping Use    Vaping Use: Never used   Substance and Sexual Activity    Alcohol use: No    Drug use: No   Social History Narrative    ** Merged History Encounter **          No Known Allergies   Family History   Problem Relation Age of Onset    Diabetes Mother     Diabetes Other         neice, type 1       Current Facility-Administered Medications   Medication Dose Route Frequency    propofol (DIPRIVAN) 10 mg/mL infusion  0-50 mcg/kg/min IntraVENous TITRATE    NOREPINephrine (LEVOPHED) 8 mg in 5% dextrose 250mL (32 mcg/mL) infusion  2-100 mcg/min IntraVENous TITRATE    sodium chloride (NS) flush 5-40 mL  5-40 mL IntraVENous Q8H    sodium chloride (NS) flush 5-40 mL  5-40 mL IntraVENous PRN    acetaminophen (TYLENOL) tablet 650 mg  650 mg Oral Q6H PRN    Or    acetaminophen (TYLENOL) suppository 650 mg  650 mg Rectal Q6H PRN    polyethylene glycol (MIRALAX) packet 17 g  17 g Oral DAILY PRN    ondansetron (ZOFRAN ODT) tablet 4 mg  4 mg Oral Q8H PRN    Or    ondansetron (ZOFRAN) injection 4 mg  4 mg IntraVENous Q6H PRN    chlorhexidine (ORAL CARE KIT) 0.12 % mouthwash 15 mL  15 mL Oral Q12H    [Held by provider] dextrose 5% and 0.9% NaCl infusion  75 mL/hr IntraVENous CONTINUOUS    pantoprazole (PROTONIX) 40 mg in 0.9% sodium chloride 10 mL injection  40 mg IntraVENous Q12H    cefepime (MAXIPIME) 1 g in 0.9% sodium chloride (MBP/ADV) 50 mL MBP  1 g IntraVENous Q12H    [START ON 8/14/2022] levoFLOXacin (LEVAQUIN) 500 mg in D5W IVPB  500 mg IntraVENous Q48H    sodium bicarbonate (8.4%) 150 mEq in 0.45% sodium chloride 1,000 mL infusion   IntraVENous CONTINUOUS    0.9% sodium chloride infusion 250 mL  250 mL IntraVENous PRN    balsam peru-castor oiL (VENELEX) ointment   Topical Q12H    sodium chloride (NS) flush 5-10 mL  5-10 mL IntraVENous PRN    glucose chewable tablet 16 g  4 Tablet Oral PRN    glucagon (GLUCAGEN) injection 1 mg  1 mg IntraMUSCular PRN    dextrose 10% infusion 0-250 mL  0-250 mL IntraVENous PRN    insulin regular (NOVOLIN R, HUMULIN R) 100 Units in 0.9% sodium chloride 100 mL infusion  0-50 Units/hr IntraVENous TITRATE        Review of Symptoms: Pt intubated and sedated       Objective:      Visit Vitals  BP (!) 140/87   Pulse 79   Temp 97.3 °F (36.3 °C)   Resp 24   Ht 5' 9\" (1.753 m)   Wt 84.7 kg (186 lb 11.7 oz)   SpO2 97%   BMI 27.58 kg/m²       Physical Exam     General: Well developed, sedated, vitals stable  HEENT: No carotid bruits, no JVD, trach is midline. Neck Supple,  Heart:  Normal S1/S2 negative S3 or S4. Regular, no murmur, gallop or rub. Respiratory: Clear bilaterally x 4, no wheezing or rales  Abdomen:   Soft, non-tender, no masses, bowel sounds are active. Extremities:  Bilateral LE trace edema, normal cap refill, no cyanosis, atraumatic. Mechanical compression stockings in place. Neuro: Sedated, intubated, non responsive . Skin: Skin color is normal. No rashes or lesions.  Non diaphoretic  Vascular: 2+ pulses symmetric in all extremities    Data Review:   Recent Labs     08/12/22  1015 08/12/22  0346 08/11/22  2332   WBC 9.3 10.9 12.6*   HGB 6.8* 7.2* 7.7* HCT 19.6* 22.5* 25.6*    352 407*     Recent Labs     08/12/22  1405 08/12/22  1019 08/12/22  0346 08/12/22  0246 08/12/22  0041 08/11/22  2332    140 136  --  136 132*   K 2.9* 3.1* 4.3  --  5.2* 5.9*   * 108 105  --  104 99   CO2 20* 17* 9*  --  10* 8*   * 388* 646*   < > 756* 812*   BUN 78* 81* 82*  --  82* 84*   CREA 5.50* 5.70* 5.44*  --  5.58* 5.93*   CA 8.0* 7.9* 7.8*  --  8.0* 7.9*   MG  --   --  2.7*  --  2.9*  --    PHOS  --   --  7.5*  --  9.1*  --    ALB  --   --   --   --   --  1.9*   TBILI  --   --   --   --   --  0.4   ALT  --   --   --   --   --  32    < > = values in this interval not displayed. No results for input(s): TROIQ, CPK, CKMB in the last 72 hours. Intake/Output Summary (Last 24 hours) at 8/12/2022 1647  Last data filed at 8/12/2022 1630  Gross per 24 hour   Intake 3635.06 ml   Output 1530 ml   Net 2105.06 ml        Cardiographics    Telemetry:Sinus  QTC 460ms   ECG: Sinus bradycardia prolonged QT interval.   Echocardiogram: Pending   CXRAY:Satisfactory endotracheal tube placement. The enteric tube terminates in the  distal esophagus. Stable bilateral lung opacities. Assessment:       Active Problems:    DKA (diabetic ketoacidosis) ([address] (12/2/2021)         Plan:     Type II NSTEMI: In setting of DKA/FELECIA/Anemia and respiratory failure, Full ECHO ordered. Quick view at bedside EF 40% +/-5%, no pericardial effusion. Will consider BRGIHT NST once patient condition improves. Bradycardia: Resolved along with normalization of QT interval.     Will continue to follow.        CHARLY Skinner MD

## 2022-08-12 NOTE — PROGRESS NOTES
Consult received will see patient after clinic this PM   Lizbeth Thompson DNP, ANP-BC  [facility]

## 2022-08-12 NOTE — PROGRESS NOTES
0142: Patient transported from ER 19 to CCU 2544 via [id number]: Patient back on 840 ventilator. Patient tolerated well.

## 2022-08-12 NOTE — ED NOTES
Respiratory and MD at bedside for intubation    Sodium bicarb 50meq at 0028  Etomidate 10mg at 0029  flush  Rocuronium 100mg at 0029  flush    MD inserted 7.5 tube   24 at gum line   Patient is currently being bagged     Sodium bicarb 50meq  at [facility]

## 2022-08-12 NOTE — ED PROVIDER NOTES
EMERGENCY DEPARTMENT HISTORY AND PHYSICAL EXAM     ----------------------------------------------------------------------------  Please note that this dictation was completed with [employer], the [employer] voice recognition software. Quite often unanticipated grammatical, syntax, homophones, and other interpretive errors are inadvertently transcribed by the computer software. Please disregard these errors. Please excuse any errors that have escaped final proofreading  ----------------------------------------------------------------------------      Date: 8/11/2022  Patient Name: Robert Granados    History of Presenting Illness     Chief Complaint   Patient presents with    High Blood Sugar     Patient had EMS called on him for hyperglycemia and altered mental status. Patient has been having vomiting for 36 hours prior to EMS arrival according to bystanders. Patient insulin dependent and has a pump which was turned off and removed on his arrival. He was satting 77% on room air and 92% on 10 liters non-rebreather now. He was hypotensive at 77/44 for EMS. Heart rate in the 50's. History Provided By: EMS    HPI: Robert Granados is a 46 y.o. male, with significant pmhx of poorly controlled diabetes, chronic kidney dysfunction stage III, bipolar disorder, seizure disorder, depression, DKA who presents via EMS to the ED with report of nausea and vomiting for the last 3 days with change in mental status this evening prompting his mother to call 911. EMS reports patient was satting in the 76s, with hypertension on their arrival.  Patient had initiation of IV in left arm with initiation of fluids. Noted to have blood sugar running high on their monitor. Patient was seen in emergency department several days ago with complaints of bilateral lower extremity edema and referred to nephrology for further management of his acute on chronic renal insufficiency.   Patient arrives confused and unable to answer questions appropriately, repeatedly calling out for [facility]. \"      Patient unable to provide further HPI due to change in mental status.   PCP: Jose Zarco MD    No Known Allergies    Current Facility-Administered Medications   Medication Dose Route Frequency Provider Last Rate Last Admin    propofol (DIPRIVAN) 10 mg/mL infusion  0-50 mcg/kg/min IntraVENous TITRATE Otto Reid MD 14.9 mL/hr at 08/12/22 0213 30 mcg/kg/min at 08/12/22 9900    NOREPINephrine (LEVOPHED) 8 mg in 5% dextrose 250mL (32 mcg/mL) infusion  2-100 mcg/min IntraVENous TITRATE Otto Reid MD   Held at 08/12/22 0036    sodium chloride (NS) flush 5-40 mL  5-40 mL IntraVENous Q8H Candy Salas NP   10 mL at 08/12/22 3067    sodium chloride (NS) flush 5-40 mL  5-40 mL IntraVENous PRN Candy Salas NP        acetaminophen (TYLENOL) tablet 650 mg  650 mg Oral Q6H PRN Candy Salas NP        Or    acetaminophen (TYLENOL) suppository 650 mg  650 mg Rectal Q6H PRN Candy Salas NP        polyethylene glycol (MIRALAX) packet 17 g  17 g Oral DAILY PRN Candy Salas NP        ondansetron (ZOFRAN ODT) tablet 4 mg  4 mg Oral Q8H PRN Candy Salas NP        Or    ondansetron (ZOFRAN) injection 4 mg  4 mg IntraVENous Q6H PRN Candy Salas NP        chlorhexidine (ORAL CARE KIT) 0.12 % mouthwash 15 mL  15 mL Oral Q12H Chelsea Salas NP   15 mL at 08/12/22 0300    [Held by provider] dextrose 5% and 0.9% NaCl infusion  75 mL/hr IntraVENous CONTINUOUS Candy Salas NP   Held at 08/12/22 0107    pantoprazole (PROTONIX) 40 mg in 0.9% sodium chloride 10 mL injection  40 mg IntraVENous Q12H Chelsea Salas NP        cefepime (MAXIPIME) 1 g in 0.9% sodium chloride (MBP/ADV) 50 mL MBP  1 g IntraVENous Q12H Candy Salas NP        [START ON 8/14/2022] levoFLOXacin (LEVAQUIN) 500 mg in D5W IVPB  500 mg IntraVENous Q48H Chelsea Salas, NP        sodium bicarbonate (8.4%) 150 mEq in 0.45% sodium chloride 1,000 mL infusion   IntraVENous CONTINUOUS [place], Kat Cruz,  mL/hr at 22 0706 New Bag at 22 0706    sodium chloride (NS) flush 5-10 mL  5-10 mL IntraVENous PRN Rosa Leach MD        glucose chewable tablet 16 g  4 Tablet Oral PRN Rosa Leach MD        glucagon (GLUCAGEN) injection 1 mg  1 mg IntraMUSCular PRN Rosa Leach MD        dextrose 10% infusion 0-250 mL  0-250 mL IntraVENous PRN Rosa Leach MD        insulin regular (Ryan Sires R, HUMULIN R) 100 Units in 0.9% sodium chloride 100 mL infusion  0-50 Units/hr IntraVENous TITRATE Rosa Leach MD 36.7 mL/hr at 22 0634 36.7 Units/hr at 22 3607       Past History     Past Medical History:  Past Medical History:   Diagnosis Date    Bipolar 1 disorder, depressed ([facility] [address]     Bipolar disorder ([facility] [address]     Chronic kidney disease, stage 3a ([address]     Depression     Diabetes ([address]     DKA, type 1 ([address] 2013    diagnosed age 23    DKA, type 1 ([address]     H/O noncompliance with medical treatment, presenting hazards to health     MRSA (methicillin resistant staph aureus) culture positive     MRSA (methicillin resistant Staphylococcus aureus)     Face    Noncompliance with medication regimen     Second hand smoke exposure     Seizure ([address]     Seizures ([facility] [address]  or     one episode during [facility]       Past Surgical History:  Past Surgical History:   Procedure Laterality Date    HX HEENT      top left wisdom tooth    HX ORTHOPAEDIC Left     wrist; [facility]CV    UPPER GI ENDOSCOPY,BIOPSY  2018            Family History:  Family History   Problem Relation Age of Onset    Diabetes Mother     Diabetes Other         neice, type 1        Social History:  Social History     Tobacco Use    Smoking status: Former     Packs/day: 0.10     Years: 16.00     Pack years: 1.60     Types: Cigarettes     Start date: 10/4/1999     Quit date: 2020     Years since quittin.4    Smokeless tobacco: Never Vaping Use    Vaping Use: Never used   Substance Use Topics    Alcohol use: No    Drug use: No       Allergies:  No Known Allergies      Review of Systems   Review of Systems   Unable to perform ROS: Mental status change       Physical Exam   Physical Exam  Vitals and nursing note reviewed. Constitutional:       General: He is awake. He is not in acute distress. Appearance: He is well-developed. He is not diaphoretic. HENT:      Head: Normocephalic and atraumatic. Nose: Nose normal.      Mouth/Throat:      Pharynx: No oropharyngeal exudate. Eyes:      Conjunctiva/sclera: Conjunctivae normal.      Pupils: Pupils are equal, round, and reactive to light. Neck:      Vascular: No JVD. Cardiovascular:      Rate and Rhythm: Regular rhythm. Bradycardia present. Heart sounds: Normal heart sounds. No murmur heard. No friction rub. Pulmonary:      Effort: Bradypnea and prolonged expiration present. No respiratory distress. Breath sounds: No stridor. No wheezing or rales. Abdominal:      General: Bowel sounds are normal. There is no distension. Palpations: Abdomen is soft. Tenderness: There is no abdominal tenderness. There is no rebound. Musculoskeletal:         General: No tenderness. Normal range of motion. Cervical back: Normal range of motion and neck supple. Right lower leg: Edema present. Left lower leg: Edema present. Comments: Moving all extremities independently   Skin:     General: Skin is dry. Findings: No rash. Neurological:      Mental Status: He is disoriented. GCS: GCS eye subscore is 4. GCS verbal subscore is 4. GCS motor subscore is 5. Cranial Nerves: No cranial nerve deficit. Motor: Motor function is intact. Psychiatric:         Attention and Perception: He is inattentive. Behavior: Behavior is uncooperative and agitated.          Diagnostic Study Results     Labs -     Recent Results (from the past 12 hour(s))   GLUCOSE, POC    Collection Time: 08/11/22 11:13 PM   Result Value Ref Range    Glucose (POC) >600 (HH) 65 - 117 mg/dL    Performed by Delano QUIGLEY (EDT)    GLUCOSE, POC    Collection Time: 08/11/22 11:15 PM   Result Value Ref Range    Glucose (POC) >600 (HH) 65 - 117 mg/dL    Performed by Neal Councilman (EDT)    EKG, 12 LEAD, INITIAL    Collection Time: 08/11/22 11:19 PM   Result Value Ref Range    Ventricular Rate 49 BPM    Atrial Rate 49 BPM    P-R Interval 170 ms    QRS Duration 102 ms    Q-T Interval 552 ms    QTC Calculation (Bezet) 498 ms    Calculated P Axis 80 degrees    Calculated R Axis 89 degrees    Calculated T Axis 54 degrees    Diagnosis       Sinus bradycardia  Prolonged QT  When compared with ECG of 09-AUG-2022 14:10,  Vent. rate has decreased BY  33 BPM     COVID-19 RAPID TEST    Collection Time: 08/11/22 11:32 PM   Result Value Ref Range    Specimen source Nasopharyngeal      COVID-19 rapid test Not detected NOTD     METABOLIC PANEL, COMPREHENSIVE    Collection Time: 08/11/22 11:32 PM   Result Value Ref Range    Sodium 132 (L) 136 - 145 mmol/L    Potassium 5.9 (H) 3.5 - 5.1 mmol/L    Chloride 99 97 - 108 mmol/L    CO2 8 (LL) 21 - 32 mmol/L    Anion gap 25 (H) 5 - 15 mmol/L    Glucose 812 (HH) 65 - 100 mg/dL    BUN 84 (H) 6 - 20 MG/DL    Creatinine 5.93 (H) 0.70 - 1.30 MG/DL    BUN/Creatinine ratio 14 12 - 20      GFR est AA 13 (L) >60 ml/min/1.73m2    GFR est non-AA 11 (L) >60 ml/min/1.73m2    Calcium 7.9 (L) 8.5 - 10.1 MG/DL    Bilirubin, total 0.4 0.2 - 1.0 MG/DL    ALT (SGPT) 32 12 - 78 U/L    AST (SGOT) 36 15 - 37 U/L    Alk.  phosphatase 179 (H) 45 - 117 U/L    Protein, total 5.5 (L) 6.4 - 8.2 g/dL    Albumin 1.9 (L) 3.5 - 5.0 g/dL    Globulin 3.6 2.0 - 4.0 g/dL    A-G Ratio 0.5 (L) 1.1 - 2.2     CBC WITH AUTOMATED DIFF    Collection Time: 08/11/22 11:32 PM   Result Value Ref Range    WBC 12.6 (H) 4.1 - 11.1 K/uL    RBC 2.69 (L) 4.10 - 5.70 M/uL    HGB 7.7 (L) 12.1 - 17.0 g/dL    HCT 25.6 (L) 36.6 - 50.3 %    MCV 95.2 80.0 - 99.0 FL    MCH 28.6 26.0 - 34.0 PG    MCHC 30.1 30.0 - 36.5 g/dL    RDW 15.3 (H) 11.5 - 14.5 %    PLATELET 377 (H) 075 - 400 K/uL    MPV 10.9 8.9 - 12.9 FL    NRBC 0.0 0  WBC    ABSOLUTE NRBC 0.00 0.00 - 0.01 K/uL    NEUTROPHILS 80 (H) 32 - 75 %    LYMPHOCYTES 14 12 - 49 %    MONOCYTES 5 5 - 13 %    EOSINOPHILS 0 0 - 7 %    BASOPHILS 0 0 - 1 %    IMMATURE GRANULOCYTES 1 (H) 0.0 - 0.5 %    ABS. NEUTROPHILS 10.0 (H) 1.8 - 8.0 K/UL    ABS. LYMPHOCYTES 1.7 0.8 - 3.5 K/UL    ABS. MONOCYTES 0.6 0.0 - 1.0 K/UL    ABS. EOSINOPHILS 0.0 0.0 - 0.4 K/UL    ABS. BASOPHILS 0.0 0.0 - 0.1 K/UL    ABS. IMM.  GRANS. 0.2 (H) 0.00 - 0.04 K/UL    DF AUTOMATED     TROPONIN-HIGH SENSITIVITY    Collection Time: 08/11/22 11:32 PM   Result Value Ref Range    Troponin-High Sensitivity 326 (HH) 0 - 76 ng/L   BLOOD GAS,CHEM8,LACTIC ACID POC    Collection Time: 08/11/22 11:33 PM   Result Value Ref Range    Calcium, ionized (POC) 1.08 (L) 1.12 - 1.32 mmol/L    BICARBONATE 7 mmol/L    Base deficit (POC) 25.6 mmol/L    Sample source VENOUS BLOOD      CO2, POC 8 (LL) 19 - 24 MMOL/L    Sodium,  (L) 136 - 145 MMOL/L    Potassium, POC 5.8 (H) 3.5 - 5.5 MMOL/L    Chloride,  100 - 108 MMOL/L    Glucose, POC >700 (HH) 74 - 106 MG/DL    Creatinine, POC 5.9 (H) 0.6 - 1.3 MG/DL    Lactic Acid (POC) 1.34 0.40 - 2.00 mmol/L    Critical value read back 5555     pH, venous (POC) 6.83 (LL) 7.32 - 7.42      pCO2, venous (POC) 41.9 41 - 51 MMHG    pO2, venous (POC) 41 (H) 25 - 40 mmHg   GLUCOSE, POC    Collection Time: 08/11/22 11:52 PM   Result Value Ref Range    Glucose (POC) >600 (HH) 65 - 117 mg/dL    Performed by Hanny TURPIN)    GLUCOSTABILIZER    Collection Time: 08/11/22 11:54 PM   Result Value Ref Range    Glucose 601 mg/dL    Insulin order 10.8 units/hour    Insulin adminstered 10.8 units/hour    Multiplier 0.020     Low target 150 mg/dL    High target 250 mg/dL    D50 order 0.0 ml    D50 administered 0.00 ml    Minutes until next BG 60 min    Order initials mf     Administered initials mf    POC G3 - PUL    Collection Time: 08/12/22 12:22 AM   Result Value Ref Range    pH (POC) 6.88 (LL) 7.35 - 7.45      pCO2 (POC) 41.3 35.0 - 45.0 MMHG    pO2 (POC) 88 80 - 100 MMHG    HCO3 (POC) 7.7 (L) 22 - 26 MMOL/L    sO2 (POC) 86.2 (L) 92 - 97 %    Base deficit (POC) 25.7 mmol/L    Specimen type (POC) ARTERIAL      Critical value read back Ian Palma MD    URINALYSIS W/ REFLEX CULTURE    Collection Time: 08/12/22 12:40 AM    Specimen: Urine   Result Value Ref Range    Color YELLOW/STRAW      Appearance CLEAR CLEAR      Specific gravity 1.017      pH (UA) 5.5 5.0 - 8.0      Protein 300 (A) NEG mg/dL    Glucose >1,000 (A) NEG mg/dL    Ketone 15 (A) NEG mg/dL    Bilirubin Negative NEG      Blood SMALL (A) NEG      Urobilinogen 0.2 0.2 - 1.0 EU/dL    Nitrites Negative NEG      Leukocyte Esterase Negative NEG      WBC 5-10 0 - 4 /hpf    RBC 5-10 0 - 5 /hpf    Epithelial cells FEW FEW /lpf    Bacteria Negative NEG /hpf    UA:UC IF INDICATED CULTURE NOT INDICATED BY UA RESULT CNI      Granular cast 2-5 (A) NEG /lpf   DRUG SCREEN, URINE    Collection Time: 08/12/22 12:40 AM   Result Value Ref Range    AMPHETAMINES Negative NEG      BARBITURATES Negative NEG      BENZODIAZEPINES Negative NEG      COCAINE Negative NEG      METHADONE Negative NEG      OPIATES Negative NEG      PCP(PHENCYCLIDINE) Negative NEG      THC (TH-CANNABINOL) Positive (A) NEG      Drug screen comment (NOTE)    METABOLIC PANEL, BASIC    Collection Time: 08/12/22 12:41 AM   Result Value Ref Range    Sodium 136 136 - 145 mmol/L    Potassium 5.2 (H) 3.5 - 5.1 mmol/L    Chloride 104 97 - 108 mmol/L    CO2 10 (LL) 21 - 32 mmol/L    Anion gap 22 (H) 5 - 15 mmol/L    Glucose 756 (HH) 65 - 100 mg/dL    BUN 82 (H) 6 - 20 MG/DL    Creatinine 5.58 (H) 0.70 - 1.30 MG/DL    BUN/Creatinine ratio 15 12 - 20      GFR est AA 14 (L) >60 ml/min/1.73m2    GFR est non-AA 11 (L) >60 ml/min/1.73m2    Calcium 8.0 (L) 8.5 - 10.1 MG/DL   MAGNESIUM    Collection Time: 08/12/22 12:41 AM   Result Value Ref Range    Magnesium 2.9 (H) 1.6 - 2.4 mg/dL   PHOSPHORUS    Collection Time: 08/12/22 12:41 AM   Result Value Ref Range    Phosphorus 9.1 (H) 2.6 - 4.7 MG/DL   HEMOGLOBIN A1C WITH EAG    Collection Time: 08/12/22 12:41 AM   Result Value Ref Range    Hemoglobin A1c 8.7 (H) 4.0 - 5.6 %    Est. average glucose 203 mg/dL   GLUCOSE, POC    Collection Time: 08/12/22 12:48 AM   Result Value Ref Range    Glucose (POC) >600 (HH) 65 - 117 mg/dL    Performed by Heather TURPIN)    GLUCOSTABILIZER    Collection Time: 08/12/22 12:55 AM   Result Value Ref Range    Glucose 601 mg/dL    Insulin order 16.2 units/hour    Insulin adminstered 16.2 units/hour    Multiplier 0.030     Low target 150 mg/dL    High target 250 mg/dL    D50 order 0.0 ml    D50 administered 0.00 ml    Minutes until next BG 60 min    Order initials mf     Administered initials mf    BLOOD GAS, ARTERIAL    Collection Time: 08/12/22  1:34 AM   Result Value Ref Range    pH 7.09 (LL) 7.35 - 7.45      PCO2 27 (L) 35 - 45 mmHg    PO2 276 (H) 80 - 100 mmHg    O2 SAT 99 (H) 92 - 97 %    BICARBONATE 8 (L) 22 - 26 mmol/L    BASE DEFICIT 20.5 mmol/L    O2 METHOD VENT      FIO2 100 %    MODE ASSIST CONTROL      Tidal volume 500.0      SET RATE 24      PEEP/CPAP 5.0      Sample source ARTERIAL      SITE RIGHT RADIAL      KEIKO'S TEST NOT APPLICABLE      Critical value read back Called to MD Florin on 08/12/2022 at 01:38    GLUCOSE, POC    Collection Time: 08/12/22  2:03 AM   Result Value Ref Range    Glucose (POC) >600 (HH) 65 - 117 mg/dL    Performed by Ted Nice RN    GLUCOSTABILIZER    Collection Time: 08/12/22  2:03 AM   Result Value Ref Range    Glucose 600 mg/dL    Insulin order 21.6 units/hour    Insulin adminstered 21.6 units/hour    Multiplier 0.040     Low target 150 mg/dL    High target 250 mg/dL D50 order 0.0 ml    D50 administered 0.00 ml    Minutes until next BG 60 min    Order initials im     Administered initials im    GLUCOSE, RANDOM    Collection Time: 08/12/22  2:46 AM   Result Value Ref Range    Glucose 698 (HH) 65 - 100 mg/dL   GLUCOSE, POC    Collection Time: 08/12/22  3:12 AM   Result Value Ref Range    Glucose (POC) >600 (HH) 65 - 117 mg/dL    Performed by Alejandra Sparks RN    GLUCOSTABILIZER    Collection Time: 08/12/22  3:13 AM   Result Value Ref Range    Glucose 600 mg/dL    Insulin order 27.0 units/hour    Insulin adminstered 27.0 units/hour    Multiplier 0.050     Low target 150 mg/dL    High target 250 mg/dL    D50 order 0.0 ml    D50 administered 0.00 ml    Minutes until next BG 60 min    Order initials im     Administered initials im    TROPONIN-HIGH SENSITIVITY    Collection Time: 08/12/22  3:46 AM   Result Value Ref Range    Troponin-High Sensitivity 253 (HH) 0 - 76 ng/L   CBC W/O DIFF    Collection Time: 08/12/22  3:46 AM   Result Value Ref Range    WBC 10.9 4.1 - 11.1 K/uL    RBC 2.48 (L) 4.10 - 5.70 M/uL    HGB 7.2 (L) 12.1 - 17.0 g/dL    HCT 22.5 (L) 36.6 - 50.3 %    MCV 90.7 80.0 - 99.0 FL    MCH 29.0 26.0 - 34.0 PG    MCHC 32.0 30.0 - 36.5 g/dL    RDW 15.3 (H) 11.5 - 14.5 %    PLATELET 626 152 - 742 K/uL    MPV 11.0 8.9 - 12.9 FL    NRBC 0.0 0  WBC    ABSOLUTE NRBC 0.00 0.00 - 0.01 K/uL   MAGNESIUM    Collection Time: 08/12/22  3:46 AM   Result Value Ref Range    Magnesium 2.7 (H) 1.6 - 2.4 mg/dL   PHOSPHORUS    Collection Time: 08/12/22  3:46 AM   Result Value Ref Range    Phosphorus 7.5 (H) 2.6 - 4.7 MG/DL   METABOLIC PANEL, BASIC    Collection Time: 08/12/22  3:46 AM   Result Value Ref Range    Sodium 136 136 - 145 mmol/L    Potassium 4.3 3.5 - 5.1 mmol/L    Chloride 105 97 - 108 mmol/L    CO2 9 (LL) 21 - 32 mmol/L    Anion gap 22 (H) 5 - 15 mmol/L    Glucose 646 (HH) 65 - 100 mg/dL    BUN 82 (H) 6 - 20 MG/DL    Creatinine 5.44 (H) 0.70 - 1.30 MG/DL    BUN/Creatinine ratio 15 12 - 20      GFR est AA 14 (L) >60 ml/min/1.73m2    GFR est non-AA 12 (L) >60 ml/min/1.73m2    Calcium 7.8 (L) 8.5 - 10.1 MG/DL   TSH 3RD GENERATION    Collection Time: 08/12/22  3:46 AM   Result Value Ref Range    TSH 7.28 (H) 0.36 - 3.74 uIU/mL   GLUCOSE, POC    Collection Time: 08/12/22  4:17 AM   Result Value Ref Range    Glucose (POC) 561 (H) 65 - 117 mg/dL    Performed by Brigida Jiménez RN    GLUCOSTABILIZER    Collection Time: 08/12/22  4:21 AM   Result Value Ref Range    Glucose 561 mg/dL    Insulin order 30.1 units/hour    Insulin adminstered 30.1 units/hour    Multiplier 0.060     Low target 150 mg/dL    High target 250 mg/dL    D50 order 0.0 ml    D50 administered 0.00 ml    Minutes until next BG 60 min    Order initials im     Administered initials im    GLUCOSE, POC    Collection Time: 08/12/22  5:25 AM   Result Value Ref Range    Glucose (POC) 571 (H) 65 - 117 mg/dL    Performed by Brigida Jiménez RN    GLUCOSTABILIZER    Collection Time: 08/12/22  5:26 AM   Result Value Ref Range    Glucose 571 mg/dL    Insulin order 35.8 units/hour    Insulin adminstered 35.8 units/hour    Multiplier 0.070     Low target 150 mg/dL    High target 250 mg/dL    D50 order 0.0 ml    D50 administered 0.00 ml    Minutes until next BG 60 min    Order initials im     Administered initials im    GLUCOSE, POC    Collection Time: 08/12/22  6:32 AM   Result Value Ref Range    Glucose (POC) 519 (H) 65 - 117 mg/dL    Performed by Brigida Jiménez RN    GLUCOSTABILIZER    Collection Time: 08/12/22  6:33 AM   Result Value Ref Range    Glucose 519 mg/dL    Insulin order 36.7 units/hour    Insulin adminstered 36.7 units/hour    Multiplier 0.080     Low target 150 mg/dL    High target 250 mg/dL    D50 order 0.0 ml    D50 administered 0.00 ml    Minutes until next BG 60 min    Order initials im     Administered initials im    BLOOD GAS, ARTERIAL    Collection Time: 08/12/22  6:37 AM   Result Value Ref Range    pH 7.27 (L) 7.35 - 7.45 PCO2 26 (L) 35 - 45 mmHg    PO2 84 80 - 100 mmHg    O2 SAT 95 92 - 97 %    BICARBONATE 12 (L) 22 - 26 mmol/L    BASE DEFICIT 13.3 mmol/L    O2 METHOD VENT      FIO2 80 %    MODE ASSIST CONTROL      Tidal volume 500.0      SET RATE 24      PEEP/CPAP 5.0      Sample source ARTERIAL      SITE RIGHT RADIAL      KEIKO'S TEST NOT APPLICABLE         Radiologic Studies -   XR ABD (KUB)   Final Result   The enteric tube terminates in the stomach. XR CHEST PORT   Final Result      Satisfactory endotracheal tube placement. The enteric tube terminates in the   distal esophagus. Stable bilateral lung opacities. XR CHEST PORT   Final Result   Interval development of bilateral hazy lung opacities concerning for edema or   infection. CT Results  (Last 48 hours)      None          CXR Results  (Last 48 hours)                 08/12/22 0107  XR CHEST PORT Final result    Impression:      Satisfactory endotracheal tube placement. The enteric tube terminates in the   distal esophagus. Stable bilateral lung opacities. Narrative:  EXAM:  XR CHEST PORT       INDICATION: Intubated       COMPARISON: 8/11/2022 at 2335 hours       TECHNIQUE: Portable AP semiupright chest view at 0058 hours       FINDINGS: The endotracheal tube terminates above the shaw. The enteric tube   terminates in the distal esophagus. The cardiomediastinal contours are stable. There are stable bilateral lung opacities. There is no pleural effusion or   pneumothorax. The bones and upper abdomen are stable. 08/11/22 2339  XR CHEST PORT Final result    Impression:  Interval development of bilateral hazy lung opacities concerning for edema or   infection. Narrative:  EXAM: XR CHEST PORT       DATE: 8/11/2022 11:39 PM       INDICATION: Eval for Infiltrate       COMPARISON: Chest radiograph August 9, 2022       FINDINGS: AP portable chest radiograph. The heart is borderline enlarged.  There   are bilateral hazy airspace infiltrates. No definite effusion or pneumothorax is   seen. No displaced fracture is identified. Medical Decision Making   I am the first provider for this patient. I reviewed the vital signs, available nursing notes, past medical history, past surgical history, family history and social history. Vital Signs-Reviewed the patient's vital signs.   Patient Vitals for the past 12 hrs:   Temp Pulse Resp BP SpO2   08/12/22 0630 99 °F (37.2 °C) 84 24 (!) 113/57 100 %   08/12/22 0600 98.2 °F (36.8 °C) 81 24 (!) 107/55 100 %   08/12/22 0530 97.3 °F (36.3 °C) 80 24 (!) 110/58 100 %   08/12/22 0500 (!) 96.1 °F (35.6 °C) 79 24 109/62 100 %   08/12/22 0443 (!) 95.5 °F (35.3 °C) 77 24 -- 100 %   08/12/22 0442 (!) 95.5 °F (35.3 °C) 76 24 -- 100 %   08/12/22 0441 (!) 95.4 °F (35.2 °C) 76 24 -- 100 %   08/12/22 0430 (!) 95 °F (35 °C) 75 24 108/63 100 %   08/12/22 0400 (!) 93.9 °F (34.4 °C) 73 24 114/63 100 %   08/12/22 0336 -- 71 24 -- 100 %   08/12/22 0330 (!) 92.8 °F (33.8 °C) 70 24 109/65 100 %   08/12/22 0300 (!) 91.8 °F (33.2 °C) 66 24 106/61 100 %   08/12/22 0200 -- 64 24 108/65 100 %   08/12/22 0153 -- 65 24 -- 100 %   08/12/22 0123 -- 66 -- 111/60 100 %   08/12/22 0108 -- 68 -- 90/78 100 %   08/12/22 0053 -- 72 -- (!) 108/57 100 %   08/12/22 0052 -- -- -- -- (!) 83 %   08/12/22 0047 -- 76 -- 115/63 100 %   08/12/22 0037 -- 80 24 -- 100 %   08/12/22 0017 -- (!) 34 (!) 5 (!) 73/41 91 %   08/12/22 0007 -- -- -- -- 96 %   08/12/22 0003 -- 77 8 (!) 79/38 95 %   08/12/22 0002 -- 75 8 (!) 74/38 95 %   08/11/22 2348 -- 80 13 (!) 88/67 98 %   08/11/22 2340 (!) 92.3 °F (33.5 °C) -- -- -- --   08/11/22 2333 -- 63 24 (!) 81/53 99 %   08/11/22 2320 -- (!) 50 18 (!) 81/48 98 %       Pulse Oximetry Analysis - 98% on on nonrebreather, abnormal  Rate: 50 bpm  Rhythm: Bradycardia      Provider Notes (Medical Decision Making):     DDX:  DKA, COVID, dehydration, acute on chronic renal failure, electrolyte derangement, pneumonia, volume overload    Plan:  IV fluids, insulin bolus and insulin infusion, COVID test, chest x-ray, labs, lactate, cultures, antibiotic coverage for undefined sepsis    Impression:  DKA, acute renal failure, hyperkalemia, anemia    ED Course:   Initial assessment performed. The patients presenting problems have been discussed, and they are in agreement with the care plan formulated and outlined with them. I have encouraged them to ask questions as they arise throughout their visit. I reviewed the nursing notes and and vital signs from today's visit, as well as the electronic medical record system for any past medical records that were available that may contribute to the patients current condition, including Previous ER visit for acute on chronic renal insufficiency with bilateral lower extremity edema    Nursing notes will be reviewed as they become available in realtime while the pt has been in the ED. Arnulfo Sosa MD      EKG interpretation 8715: sinus carloz, nl Axis, rate 49; , , QTc 498; NO STEMI; interpreted by Arnulfo Sosa MD    I personally reviewed/interpreted pt's imaging. Agree with official read by radiology as noted above. Arnulfo Sosa MD      ED [facility] ED SEPSIS NOTE:     11:15 PM The patient now meets criteria for: Septic Shock    Fluid resuscitation with: 30 mL/kg crystalloid bolus  Due to concern for rapidly advancing infection and deterioration of patient's condition, antibiotics are started STAT and cultures ordered. ----------------------------------------------------  PROGRESS NOTE:  12:15 AM   Pt becoming more lethargic and less responsive. Heart rate bradycardic with slowing respirations. We will move forward with intubation but will seedlings cardiac membranes with calcium and treat acidosis with bicarb. Will use push dose epi as needed.     Procedure Note - Intubation:   Performed by Arnulfo Sosa MD .     Immediately prior to the procedure, the patient was reevaluated and found suitable for the planned procedure and any planned medications. Immediately prior to the procedure a time out was called to verify the correct patient, procedure, equipment, staff, and marking as appropriate. Medications given were etomidate and rocuronium (Zemuron). A number 7.5 cuffed   ETT was placed to 24 cm at the teeth. Placement was evaluated by noting bilateral, symmetric breath sounds, good end-tidal CO2 detector color change , no breath sounds over stomach, bulb aspirator expands promptly, and chest x-ray visualization. Attempts required: 1. Complications: none. RSI was used. .  The procedure was tolerated well. Sis Starr MD spoke with NP Adama Nelson,   Specialty: [facility] NP due to DKA/cute respiratory failure, acute renal failure. Discussed pt's HPI and available diagnostic results thus far.   Consultant will evaluate patient for admission  Sis Starr MD      I've performed a sepsis reassessment of the patient's clinical volume status and tissue perfusion at time 1:42 AM       Critical Care Time:       CRITICAL CARE NOTE  IMPENDING DETERIORATION -Airway, Respiratory, Cardiovascular, CNS, Metabolic, Renal, and Hepatic  ASSOCIATED RISK FACTORS - Hypotension, Shock, Hypoxia, Dysrhythmia, Metabolic changes, Dehydration, Vascular Compromise, and CNS Decompensation  MANAGEMENT- Bedside Assessment and Supervision of Care  INTERPRETATION -  Xrays, Blood Gases, ECG, Blood Pressure, and Cardiac Output Measures   INTERVENTIONS - hemodynamic mngmt, vent mngmt, gastric tube, vascular control, Neurologic interventions , and Metobolic interventions  CASE REVIEW - Hospitalist/Intensivist and Nursing  TREATMENT RESPONSE -Improved  PERFORMED BY - Self  NOTES   :  I have spent 60 minutes of critical care time involved in lab review, consultations with specialist, family decision- making, bedside attention and documentation excluding time spent on any separately billed procedures. During this entire length of time I was immediately available to the patient . Jasen Saunders MD        Diagnosis     Clinical Impression:   1. Diabetic ketoacidosis with coma associated with type 1 diabetes mellitus ([facility]    2. Acute renal failure, unspecified acute renal failure type ([facility]    3. Acute hyperkalemia    4. Dehydration    5. Acute respiratory failure with hypoxia (HCC)        PLAN:  1.  Admit to ICU

## 2022-08-12 NOTE — PROGRESS NOTES
0700  Bedside and verbal report from Santos Petit RN. Patient is intubated, on ventilator, sedated on propofol @ 30 mcg/kg/min. On insulin drip per glucostabilizer. 0800  Assessment completed. See flow sheets. Consult sent to nephrology Dr. Tonia Mcleod.    0900  Urine output marginal. BG continues 486 on insulin drip currently @ 42.6 units/hr. 1000  BG now 437 Interdisciplinary team rounds were held 8/12/2022 with the following team members:Care Management, Nursing, Nutrition, Occupational Therapy, Pharmacy, Physical Therapy, Physician, Respiratory Therapy, and Clinical Coordinator and the patient. Plan of care discussed. See clinical pathway and/or care plan for interventions and desired outcomes. Goals of the Day: Continue on insulin and bicarb drips. Monitor labs for status of DKA. 1100  BG now 370. Labs pending. 1107  Saline bolus 1000 ml started. 1130  Blood sent to blood bank for type and screen. One unit PRBC's ordered per Dr. Veronica Ibarra. 1200  Saline bolus completed. Reassessment completed. Continues on insulin, propofol, and bicarb drips. 1300  Urine output 75 ml/hr. Insulin drip now @ 30.5 units/hr    1441  RBC's started. 1600  RBC's continue to infuse. Reassessment completed. Consult placed to cardiology by Dr. Veronica Ibarra.    1700  RBC's completed. 1733  FSBS 65  Insulin drip stopped and 75 ml D10 given per glucostablizer protocol equivalent for D50. Beatrice Branch NP here to see patient. 1752  FSBS 88   Insulin drip restarted @ 1 unit/hr per glucostabilizer. [address] again 72. Insulin drip again stopped and 75 ml D10 given per glucostabilizer equivalent for D50.    1849  FSBS now 94    1851  Insulin drip resumed @ 0.3 units/hr. 1903  D5NS hung @ 75 ml/hr per order from NP. Bicarb drip stopped.      1910  Bedside and verbal report given to Cristóbal Muñiz RN

## 2022-08-12 NOTE — INTERDISCIPLINARY ROUNDS
Interdisciplinary team rounds were held 8/12/2022 with the following team members:Care Management, Nursing, Nutrition, Pharmacy, Physician, Respiratory Therapy, and Clinical Coordinator. Plan of care discussed. See clinical pathway and/or care plan for interventions and desired outcomes. Goals of the Day: Adjustments to Ventilator made. Remains on Insulin gtt.

## 2022-08-12 NOTE — DIABETES MGMT
[address] [facility]    CLINICAL NURSE SPECIALIST CONSULT     Initial Presentation   Andrew Robledo is a 46 y.o. male admitted 8/12/2022 after experiencing nausea, vomiting and AMS. Past hx of DKA. EMS reports patient was satting in the 76s, with hypertension on their arrival  LAB: Glucose 812. Creatine 5.93. GFR 11. Anion Gap 25. A1c 8.7  CXR:  CT/MRI:    HX:   Past Medical History:   Diagnosis Date    Bipolar 1 disorder, depressed ([address]     Bipolar disorder ([address]     Chronic kidney disease, stage 3a ([address]     Depression     Diabetes ([address]     DKA, type 1 ([address] 1/27/2013    diagnosed age 23    DKA, type 1 ([address]     H/O noncompliance with medical treatment, presenting hazards to health     MRSA (methicillin resistant staph aureus) culture positive     MRSA (methicillin resistant Staphylococcus aureus)     Face    Noncompliance with medication regimen     Second hand smoke exposure     Seizure ([address]     Seizures ([address] 2006 or 2007    one episode during senior living        INITIAL DX:   DKA (diabetic ketoacidosis) ([address] [E11.10]     Current Treatment     TX: Cefepime. Levaquin. Protonix. Levophed. Sodium Bicarbonate. Insulin Infusion. Consulted by Provider for advanced diabetes nursing assessment and care for:   [x] Transitioning off Sonia Mitts   [x] Inpatient management strategy  [] Home management assessment  [] Survival skill education    Hospital Course   Clinical progress has been complicated by DKA. Diabetes History   The patient reports a 20 year history of Type 1 diabetes. He has a positive family hx of diabetes (mom & niece). He follows with a PCP,  Tony Romano MD, for diabetes management.     Diabetes-related Medical History  Acute complications  DKA  Neurological complications  Peripheral neuropathy  Microvascular disease  Retinopathy    Diabetes Medication History  Key Antihyperglycemic Medications               insulin aspart U-100 (NovoLOG U-100 Insulin aspart) 100 unit/mL injection Use as instructed via insulin pump. Dx code E10.65 max daily dose 50U    insulin glargine (LANTUS) 100 unit/mL injection 16 Units by SubCUTAneous route daily. insulin aspart U-100 (NovoLOG Flexpen U-100 Insulin) 100 unit/mL (3 mL) inpn Take 1 unit for every 15 grams of carbohydrates, 1 unit for every 50 points >150. Max daily dose 25U. Overall evaluation:    [x] Achieving A1c target with drug therapy & self-care practices    Subjective   Vented and sedated     Objective   Physical exam  General Normal weight male; ill-appearing.  Conversant and cooperative  Neuro  Alert, oriented   Vital Signs Visit Vitals  /72   Pulse 82   Temp 97.9 °F (36.6 °C)   Resp 24   Ht 5' 9\" (1.753 m)   Wt 84.7 kg (186 lb 11.7 oz)   SpO2 99%   BMI 27.58 kg/m²       Laboratory  Recent Labs     08/12/22  1019 08/12/22  1015 08/12/22  0346 08/12/22  0246 08/12/22  0041 08/11/22  2332 08/09/22  1413   *  --  646* 698* 756* 812* 126*   AGAP 15  --  22*  --  22* 25* 8   WBC  --  9.3 10.9  --   --  12.6* 6.2   CREA 5.70*  --  5.44*  --  5.58* 5.93* 4.24*   GFRNA 11*  --  12*  --  11* 11* 16*   AST  --   --   --   --   --  36 27   ALT  --   --   --   --   --  32 34       Factors impacting BG management  Factor Dose Comments   Nutrition:  Standard meals     NPO      Drugs:  Vasopressor load     Levophed     Affects insulin delivery     Pain Sedated    Infection Cefepime 1 g Q 12 hours  Levofloxacin 500 mg Q 48 hours     Other:   Kidney function     CKD      Blood glucose pattern      Significant diabetes-related events over the past 24-72 hours    Assessment and Plan   Nursing Diagnosis Risk for unstable blood glucose pattern   Nursing Intervention Domain 9340 Decision-making Support   Nursing Interventions Examined current inpatient diabetes/blood glucose control   Explored factors facilitating and impeding inpatient management  Explored corrective strategies with patient and responsible inpatient provider Informed patient of rational for insulin strategy while hospitalized       Evaluation   This 46year old patient with Type 1 diabetes did not achieve BG control prior to admission as evidenced by an A1c of 8.7%. The patient had an admission glucose of 812. The patient was in DKA and was started on glucostabilzer. The patient had an admission anion gap of 25. His current gap is 15. The patient should continue on glucostabilizer according to the DKA protocol. Recommendations   Anion Gap has not closed x 2; continue on glucostabilizer per DKA protocol        Billing Code(s)   [] 87698 IP subsequent hospital care - 35 minutes [] 63344 Prolonged Services - 65 minutes [] 16603 Prolonged Services - 110 minutes  [x] 42625 IP subsequent hospital care - 25 minutes [] 63695 Prolonged Services - 55 minutes [] 25490 Prolonged Services - 100 minutes  [] 87286 IP subsequent hospital care - 15 minutes [] 46481 Prolonged Services - 45 minutes [] 19362 Prolonged Services - 90 minutes    Before making these care recommendations, I personally reviewed the hospitalization record, including notes, laboratory & diagnostic data and current medications, and examined the patient at the bedside (circumstances permitting) before making care recommendations. More than fifty (50) percent of the time was spent in patient counseling and/or care coordination.   Total minutes: 25 minutes    ANEESH Chinchilla  Diabetes Clinical Nurse Specialist  [facility]  Access via [employer]

## 2022-08-13 ENCOUNTER — APPOINTMENT (OUTPATIENT)
Dept: GENERAL RADIOLOGY | Age: 42
DRG: 420 | End: 2022-08-13
Attending: NURSE PRACTITIONER
Payer: MEDICAID

## 2022-08-13 ENCOUNTER — APPOINTMENT (OUTPATIENT)
Dept: NON INVASIVE DIAGNOSTICS | Age: 42
DRG: 420 | End: 2022-08-13
Attending: NURSE PRACTITIONER
Payer: MEDICAID

## 2022-08-13 LAB
ABO + RH BLD: NORMAL
ANION GAP SERPL CALC-SCNC: 12 MMOL/L (ref 5–15)
ANION GAP SERPL CALC-SCNC: 16 MMOL/L (ref 5–15)
BACTERIA SPEC CULT: NORMAL
BACTERIA SPEC CULT: NORMAL
BLD PROD TYP BPU: NORMAL
BLOOD GROUP ANTIBODIES SERPL: NORMAL
BPU ID: NORMAL
BUN SERPL-MCNC: 73 MG/DL (ref 6–20)
BUN SERPL-MCNC: 75 MG/DL (ref 6–20)
BUN/CREAT SERPL: 14 (ref 12–20)
BUN/CREAT SERPL: 14 (ref 12–20)
CALCIUM SERPL-MCNC: 7.7 MG/DL (ref 8.5–10.1)
CALCIUM SERPL-MCNC: 8 MG/DL (ref 8.5–10.1)
CHLORIDE SERPL-SCNC: 109 MMOL/L (ref 97–108)
CHLORIDE SERPL-SCNC: 113 MMOL/L (ref 97–108)
CO2 SERPL-SCNC: 18 MMOL/L (ref 21–32)
CO2 SERPL-SCNC: 21 MMOL/L (ref 21–32)
CREAT SERPL-MCNC: 5.37 MG/DL (ref 0.7–1.3)
CREAT SERPL-MCNC: 5.43 MG/DL (ref 0.7–1.3)
CROSSMATCH RESULT,%XM: NORMAL
ERYTHROCYTE [DISTWIDTH] IN BLOOD BY AUTOMATED COUNT: 15.8 % (ref 11.5–14.5)
GLUCOSE BLD STRIP.AUTO-MCNC: 142 MG/DL (ref 65–117)
GLUCOSE BLD STRIP.AUTO-MCNC: 165 MG/DL (ref 65–117)
GLUCOSE BLD STRIP.AUTO-MCNC: 168 MG/DL (ref 65–117)
GLUCOSE BLD STRIP.AUTO-MCNC: 171 MG/DL (ref 65–117)
GLUCOSE BLD STRIP.AUTO-MCNC: 173 MG/DL (ref 65–117)
GLUCOSE BLD STRIP.AUTO-MCNC: 181 MG/DL (ref 65–117)
GLUCOSE SERPL-MCNC: 161 MG/DL (ref 65–100)
GLUCOSE SERPL-MCNC: 187 MG/DL (ref 65–100)
HCT VFR BLD AUTO: 26.6 % (ref 36.6–50.3)
HGB BLD-MCNC: 9.2 G/DL (ref 12.1–17)
MAGNESIUM SERPL-MCNC: 2.3 MG/DL (ref 1.6–2.4)
MCH RBC QN AUTO: 28.8 PG (ref 26–34)
MCHC RBC AUTO-ENTMCNC: 34.6 G/DL (ref 30–36.5)
MCV RBC AUTO: 83.4 FL (ref 80–99)
NRBC # BLD: 0 K/UL (ref 0–0.01)
NRBC BLD-RTO: 0 PER 100 WBC
PHOSPHATE SERPL-MCNC: 4.5 MG/DL (ref 2.6–4.7)
PLATELET # BLD AUTO: 390 K/UL (ref 150–400)
PMV BLD AUTO: 10.8 FL (ref 8.9–12.9)
POTASSIUM SERPL-SCNC: 2.9 MMOL/L (ref 3.5–5.1)
POTASSIUM SERPL-SCNC: 3.8 MMOL/L (ref 3.5–5.1)
RBC # BLD AUTO: 3.19 M/UL (ref 4.1–5.7)
SERVICE CMNT-IMP: ABNORMAL
SERVICE CMNT-IMP: NORMAL
SODIUM SERPL-SCNC: 143 MMOL/L (ref 136–145)
SODIUM SERPL-SCNC: 146 MMOL/L (ref 136–145)
SPECIMEN EXP DATE BLD: NORMAL
STATUS OF UNIT,%ST: NORMAL
TROPONIN-HIGH SENSITIVITY: 259 NG/L (ref 0–76)
UNIT DIVISION, %UDIV: 0
WBC # BLD AUTO: 13.1 K/UL (ref 4.1–11.1)

## 2022-08-13 PROCEDURE — 94003 VENT MGMT INPAT SUBQ DAY: CPT

## 2022-08-13 PROCEDURE — 85027 COMPLETE CBC AUTOMATED: CPT

## 2022-08-13 PROCEDURE — 80048 BASIC METABOLIC PNL TOTAL CA: CPT

## 2022-08-13 PROCEDURE — 74011250637 HC RX REV CODE- 250/637: Performed by: INTERNAL MEDICINE

## 2022-08-13 PROCEDURE — 93306 TTE W/DOPPLER COMPLETE: CPT

## 2022-08-13 PROCEDURE — 74011250636 HC RX REV CODE- 250/636: Performed by: INTERNAL MEDICINE

## 2022-08-13 PROCEDURE — 71045 X-RAY EXAM CHEST 1 VIEW: CPT

## 2022-08-13 PROCEDURE — 74011250636 HC RX REV CODE- 250/636: Performed by: EMERGENCY MEDICINE

## 2022-08-13 PROCEDURE — 84100 ASSAY OF PHOSPHORUS: CPT

## 2022-08-13 PROCEDURE — 74011250637 HC RX REV CODE- 250/637: Performed by: NURSE PRACTITIONER

## 2022-08-13 PROCEDURE — 74018 RADEX ABDOMEN 1 VIEW: CPT

## 2022-08-13 PROCEDURE — P9045 ALBUMIN (HUMAN), 5%, 250 ML: HCPCS | Performed by: NURSE PRACTITIONER

## 2022-08-13 PROCEDURE — 74011636637 HC RX REV CODE- 636/637: Performed by: NURSE PRACTITIONER

## 2022-08-13 PROCEDURE — C9113 INJ PANTOPRAZOLE SODIUM, VIA: HCPCS | Performed by: NURSE PRACTITIONER

## 2022-08-13 PROCEDURE — 74011000250 HC RX REV CODE- 250: Performed by: NURSE PRACTITIONER

## 2022-08-13 PROCEDURE — 74011250637 HC RX REV CODE- 250/637: Performed by: HOSPITALIST

## 2022-08-13 PROCEDURE — 74011636637 HC RX REV CODE- 636/637: Performed by: INTERNAL MEDICINE

## 2022-08-13 PROCEDURE — 83735 ASSAY OF MAGNESIUM: CPT

## 2022-08-13 PROCEDURE — 65620000000 HC RM CCU GENERAL

## 2022-08-13 PROCEDURE — 84484 ASSAY OF TROPONIN QUANT: CPT

## 2022-08-13 PROCEDURE — 36415 COLL VENOUS BLD VENIPUNCTURE: CPT

## 2022-08-13 PROCEDURE — 74011250636 HC RX REV CODE- 250/636: Performed by: NURSE PRACTITIONER

## 2022-08-13 RX ORDER — ALBUMIN HUMAN 50 G/1000ML
25 SOLUTION INTRAVENOUS ONCE
Status: COMPLETED | OUTPATIENT
Start: 2022-08-13 | End: 2022-08-14

## 2022-08-13 RX ORDER — INSULIN GLARGINE 100 [IU]/ML
6 INJECTION, SOLUTION SUBCUTANEOUS ONCE
Status: COMPLETED | OUTPATIENT
Start: 2022-08-13 | End: 2022-08-13

## 2022-08-13 RX ORDER — INSULIN GLARGINE 100 [IU]/ML
16 INJECTION, SOLUTION SUBCUTANEOUS DAILY
Status: DISCONTINUED | OUTPATIENT
Start: 2022-08-14 | End: 2022-08-15

## 2022-08-13 RX ORDER — DEXTROSE MONOHYDRATE 100 MG/ML
0-250 INJECTION, SOLUTION INTRAVENOUS AS NEEDED
Status: DISCONTINUED | OUTPATIENT
Start: 2022-08-13 | End: 2022-08-20 | Stop reason: HOSPADM

## 2022-08-13 RX ORDER — INSULIN LISPRO 100 [IU]/ML
INJECTION, SOLUTION INTRAVENOUS; SUBCUTANEOUS EVERY 6 HOURS
Status: DISCONTINUED | OUTPATIENT
Start: 2022-08-13 | End: 2022-08-13

## 2022-08-13 RX ORDER — INSULIN GLARGINE 100 [IU]/ML
16 INJECTION, SOLUTION SUBCUTANEOUS DAILY
Status: DISCONTINUED | OUTPATIENT
Start: 2022-08-13 | End: 2022-08-13

## 2022-08-13 RX ORDER — CARVEDILOL 12.5 MG/1
12.5 TABLET ORAL 2 TIMES DAILY
Status: DISCONTINUED | OUTPATIENT
Start: 2022-08-13 | End: 2022-08-13

## 2022-08-13 RX ORDER — INSULIN LISPRO 100 [IU]/ML
INJECTION, SOLUTION INTRAVENOUS; SUBCUTANEOUS
Status: DISCONTINUED | OUTPATIENT
Start: 2022-08-13 | End: 2022-08-19

## 2022-08-13 RX ORDER — HEPARIN SODIUM 5000 [USP'U]/ML
5000 INJECTION, SOLUTION INTRAVENOUS; SUBCUTANEOUS EVERY 8 HOURS
Status: DISCONTINUED | OUTPATIENT
Start: 2022-08-13 | End: 2022-08-13

## 2022-08-13 RX ORDER — MAGNESIUM SULFATE 100 %
4 CRYSTALS MISCELLANEOUS AS NEEDED
Status: DISCONTINUED | OUTPATIENT
Start: 2022-08-13 | End: 2022-08-20 | Stop reason: HOSPADM

## 2022-08-13 RX ORDER — AMLODIPINE BESYLATE 5 MG/1
10 TABLET ORAL DAILY
Status: DISCONTINUED | OUTPATIENT
Start: 2022-08-14 | End: 2022-08-18

## 2022-08-13 RX ORDER — AMOXICILLIN 250 MG
1 CAPSULE ORAL DAILY
Status: DISCONTINUED | OUTPATIENT
Start: 2022-08-13 | End: 2022-08-20 | Stop reason: HOSPADM

## 2022-08-13 RX ORDER — INSULIN GLARGINE 100 [IU]/ML
10 INJECTION, SOLUTION SUBCUTANEOUS DAILY
Status: DISCONTINUED | OUTPATIENT
Start: 2022-08-13 | End: 2022-08-13

## 2022-08-13 RX ORDER — AMLODIPINE BESYLATE 5 MG/1
10 TABLET ORAL DAILY
Status: DISCONTINUED | OUTPATIENT
Start: 2022-08-13 | End: 2022-08-13

## 2022-08-13 RX ORDER — DEXTROSE MONOHYDRATE 100 MG/ML
0-250 INJECTION, SOLUTION INTRAVENOUS AS NEEDED
Status: DISCONTINUED | OUTPATIENT
Start: 2022-08-13 | End: 2022-08-15

## 2022-08-13 RX ORDER — CARVEDILOL 12.5 MG/1
12.5 TABLET ORAL 2 TIMES DAILY
Status: DISCONTINUED | OUTPATIENT
Start: 2022-08-13 | End: 2022-08-16

## 2022-08-13 RX ORDER — LABETALOL HYDROCHLORIDE 5 MG/ML
10 INJECTION, SOLUTION INTRAVENOUS
Status: DISCONTINUED | OUTPATIENT
Start: 2022-08-13 | End: 2022-08-14

## 2022-08-13 RX ORDER — SODIUM CHLORIDE 9 MG/ML
50 INJECTION, SOLUTION INTRAVENOUS CONTINUOUS
Status: DISCONTINUED | OUTPATIENT
Start: 2022-08-13 | End: 2022-08-13

## 2022-08-13 RX ORDER — INSULIN LISPRO 100 [IU]/ML
INJECTION, SOLUTION INTRAVENOUS; SUBCUTANEOUS EVERY 4 HOURS
Status: DISCONTINUED | OUTPATIENT
Start: 2022-08-13 | End: 2022-08-13

## 2022-08-13 RX ADMIN — SODIUM CHLORIDE, PRESERVATIVE FREE 10 ML: 5 INJECTION INTRAVENOUS at 13:13

## 2022-08-13 RX ADMIN — SODIUM CHLORIDE, PRESERVATIVE FREE 10 ML: 5 INJECTION INTRAVENOUS at 21:01

## 2022-08-13 RX ADMIN — PROPOFOL 30 MCG/KG/MIN: 10 INJECTION, EMULSION INTRAVENOUS at 09:15

## 2022-08-13 RX ADMIN — AMLODIPINE BESYLATE 10 MG: 5 TABLET ORAL at 00:08

## 2022-08-13 RX ADMIN — Medication 1 AMPULE: at 20:26

## 2022-08-13 RX ADMIN — POTASSIUM BICARBONATE 40 MEQ: 782 TABLET, EFFERVESCENT ORAL at 05:07

## 2022-08-13 RX ADMIN — LABETALOL HYDROCHLORIDE 10 MG: 5 INJECTION INTRAVENOUS at 21:00

## 2022-08-13 RX ADMIN — CASTOR OIL AND BALSAM, PERU: 788; 87 OINTMENT TOPICAL at 09:23

## 2022-08-13 RX ADMIN — PROPOFOL 50 MCG/KG/MIN: 10 INJECTION, EMULSION INTRAVENOUS at 08:07

## 2022-08-13 RX ADMIN — CARVEDILOL 12.5 MG: 12.5 TABLET, FILM COATED ORAL at 17:28

## 2022-08-13 RX ADMIN — CHLORHEXIDINE GLUCONATE 15 ML: 1.2 RINSE ORAL at 09:22

## 2022-08-13 RX ADMIN — INSULIN GLARGINE 10 UNITS: 100 INJECTION, SOLUTION SUBCUTANEOUS at 00:42

## 2022-08-13 RX ADMIN — CHLORHEXIDINE GLUCONATE 15 ML: 1.2 RINSE ORAL at 20:25

## 2022-08-13 RX ADMIN — CARVEDILOL 12.5 MG: 12.5 TABLET, FILM COATED ORAL at 09:16

## 2022-08-13 RX ADMIN — POTASSIUM CHLORIDE: 149 INJECTION, SOLUTION, CONCENTRATE INTRAVENOUS at 10:48

## 2022-08-13 RX ADMIN — SODIUM CHLORIDE 500 ML: 9 INJECTION, SOLUTION INTRAVENOUS at 04:41

## 2022-08-13 RX ADMIN — Medication 2 UNITS: at 05:06

## 2022-08-13 RX ADMIN — PROPOFOL 50 MCG/KG/MIN: 10 INJECTION, EMULSION INTRAVENOUS at 03:26

## 2022-08-13 RX ADMIN — SODIUM CHLORIDE 50 ML/HR: 9 INJECTION, SOLUTION INTRAVENOUS at 05:37

## 2022-08-13 RX ADMIN — PROPOFOL 40 MCG/KG/MIN: 10 INJECTION, EMULSION INTRAVENOUS at 09:14

## 2022-08-13 RX ADMIN — INSULIN GLARGINE 6 UNITS: 100 INJECTION, SOLUTION SUBCUTANEOUS at 05:06

## 2022-08-13 RX ADMIN — ACETAMINOPHEN 650 MG: 325 TABLET ORAL at 21:49

## 2022-08-13 RX ADMIN — LABETALOL HYDROCHLORIDE 10 MG: 5 INJECTION INTRAVENOUS at 09:20

## 2022-08-13 RX ADMIN — Medication 2 UNITS: at 13:12

## 2022-08-13 RX ADMIN — LABETALOL HYDROCHLORIDE 10 MG: 5 INJECTION INTRAVENOUS at 02:03

## 2022-08-13 RX ADMIN — Medication 2 UNITS: at 17:28

## 2022-08-13 RX ADMIN — SENNOSIDES AND DOCUSATE SODIUM 1 TABLET: 50; 8.6 TABLET ORAL at 21:49

## 2022-08-13 RX ADMIN — ALBUMIN (HUMAN) 25 G: 12.5 INJECTION, SOLUTION INTRAVENOUS at 22:48

## 2022-08-13 RX ADMIN — CASTOR OIL AND BALSAM, PERU: 788; 87 OINTMENT TOPICAL at 20:26

## 2022-08-13 RX ADMIN — SODIUM CHLORIDE 40 MG: 9 INJECTION INTRAMUSCULAR; INTRAVENOUS; SUBCUTANEOUS at 09:27

## 2022-08-13 RX ADMIN — LABETALOL HYDROCHLORIDE 10 MG: 5 INJECTION INTRAVENOUS at 17:29

## 2022-08-13 RX ADMIN — Medication 1 AMPULE: at 09:25

## 2022-08-13 RX ADMIN — LEVOFLOXACIN 500 MG: 5 INJECTION, SOLUTION INTRAVENOUS at 23:42

## 2022-08-13 RX ADMIN — SODIUM CHLORIDE, PRESERVATIVE FREE 10 ML: 5 INJECTION INTRAVENOUS at 05:08

## 2022-08-13 NOTE — PROGRESS NOTES
[address] [facility]  YOB: 1980          Assessment & Plan:     FELECIA stage III on CKD stage IV(secondary to intravenous volume depletion)  - cr stable but 1.4 lit urine and +Ve balance  CKD stage IV(baseline creatinine 2.8-3.5)(secondary to uncontrolled diabetes)  Hypokalemia: total Body K depletion  Anion gap metabolic acidosis sec to DKA  - improving  Non-anion gap metabolic acidosis  Diabetic ketoacidosis  Type 1 diabetes uncontrolled  Hyperphosphatemia  Elevated troponins  Community-acquired pneumonia  Acute hypoxic respiratory failure on mechanical ventilation  PLAN  1. Replete K  2. No HD needed yet but heading towards it  3. Labs in am  4. I tried to reach the family : not able to  5. DW CCM: Will adjust IVF. Subjective:   CC:CKD  HPI: Patient seen   FELECIA on CKD. Cr is not better. K low, Acidosis better but still+.  Making little urine On Vent  ROS: not available due to vent  Current Facility-Administered Medications   Medication Dose Route Frequency    labetaloL (NORMODYNE;TRANDATE) injection 10 mg  10 mg IntraVENous Q4H PRN    amLODIPine (NORVASC) tablet 10 mg  10 mg Per NG tube DAILY    carvediloL (COREG) tablet 12.5 mg  12.5 mg Per NG tube BID    dextrose 10% infusion 0-250 mL  0-250 mL IntraVENous PRN    0.9% sodium chloride infusion  50 mL/hr IntraVENous CONTINUOUS    [START ON 8/14/2022] insulin glargine (LANTUS) injection 16 Units  16 Units SubCUTAneous DAILY    insulin lispro (HUMALOG) injection   SubCUTAneous Q4H    propofol (DIPRIVAN) 10 mg/mL infusion  0-50 mcg/kg/min IntraVENous TITRATE    sodium chloride (NS) flush 5-40 mL  5-40 mL IntraVENous Q8H    sodium chloride (NS) flush 5-40 mL  5-40 mL IntraVENous PRN    acetaminophen (TYLENOL) tablet 650 mg  650 mg Oral Q6H PRN    Or    acetaminophen (TYLENOL) suppository 650 mg  650 mg Rectal Q6H PRN    polyethylene glycol (MIRALAX) packet 17 g  17 g Oral DAILY PRN ondansetron (ZOFRAN ODT) tablet 4 mg  4 mg Oral Q8H PRN    Or    ondansetron (ZOFRAN) injection 4 mg  4 mg IntraVENous Q6H PRN    chlorhexidine (ORAL CARE KIT) 0.12 % mouthwash 15 mL  15 mL Oral Q12H    pantoprazole (PROTONIX) 40 mg in 0.9% sodium chloride 10 mL injection  40 mg IntraVENous Q12H    cefepime (MAXIPIME) 1 g in 0.9% sodium chloride (MBP/ADV) 50 mL MBP  1 g IntraVENous Q12H    [START ON 2022] levoFLOXacin (LEVAQUIN) 500 mg in D5W IVPB  500 mg IntraVENous Q48H    0.9% sodium chloride infusion 250 mL  250 mL IntraVENous PRN    balsam peru-castor oiL (VENELEX) ointment   Topical Q12H    alcohol 62% (NOZIN) nasal  1 Ampule  1 Ampule Topical Q12H    fentaNYL citrate (PF) injection 50 mcg  50 mcg IntraVENous Q4H PRN    sodium chloride (NS) flush 5-10 mL  5-10 mL IntraVENous PRN          Objective:     Vitals:  Blood pressure (!) 151/86, pulse 84, temperature 97.3 °F (36.3 °C), resp. rate (!) 32, height 5' 9\" (1.753 m), weight 89.4 kg (197 lb), SpO2 95 %. Temp (24hrs), Av.3 °F (36.3 °C), Min:96.6 °F (35.9 °C), Max:99.1 °F (37.3 °C)      Intake and Output:   07 - 1900  In: 149.6 [I.V.:149.6]  Out: 275 [Urine:275]  1901 -  0700  In: 6127.5 [I.V.:5877.5]  Out: 2970 [Urine:2520]    Physical Exam:                Patient is intubated:  yes    Physical Examination:   GENERAL ASSESSMENT: NAD  HEENT:Nontraumatic   CHEST: CTA  HEART: S1S2  ABDOMEN: slightly distended,NT,  :Beard: yes  EXTREMITY: EDEMA 1  NEURO:sedated          ECG/rhythm:    Data Review      No results for input(s): TNIPOC in the last 72 hours. No lab exists for component: ITNL   No results for input(s): CPK, CKMB, TROIQ in the last 72 hours.   Recent Labs     22  0338 22  2154 22  1742 22  1019 22  1015 22  0346 22  0246 22  0041 22  2332    144 145   < >  --  136  --  136 132*   K 2.9* 3.3* 3.0*   < >  --  4.3  --  5.2* 5.9*   * 110* 110*   < >  --  105  --  104 99   CO2 18* 21 22   < >  --  9*  --  10* 8*   BUN 75* 77* 77*   < >  --  82*  --  82* 84*   CREA 5.43* 5.39* 5.50*   < >  --  5.44*  --  5.58* 5.93*   * 98 93   < >  --  646*   < > 756* 812*   PHOS 4.5  --   --   --   --  7.5*  --  9.1*  --    MG 2.3  --   --   --   --  2.7*  --  2.9*  --    CA 8.0* 7.6* 7.8*   < >  --  7.8*  --  8.0* 7.9*   ALB  --   --   --   --   --   --   --   --  1.9*   WBC 13.1*  --  10.9  --  9.3 10.9  --   --  12.6*   HGB 9.2*  --  8.1*  --  6.8* 7.2*  --   --  7.7*   HCT 26.6*  --  23.4*  --  19.6* 22.5*  --   --  25.6*     --  384  --  376 352  --   --  407*    < > = values in this interval not displayed. No results for input(s): INR, PTP, APTT, INREXT in the last 72 hours. Needs: urine analysis, urine sodium, protein and creatinine  Lab Results   Component Value Date/Time    Sodium,urine random 33 07/11/2022 03:10 AM    Creatinine, urine 55.40 07/12/2022 04:09 AM         Discussed with:  Colleague    : Nancy Mcgill MD  8/13/2022        [facility]:  [email or web address]  Nikita Álvarez office:  [address] [facility] [address] [facility] [address]  [place], [address] [facility]  Phone: [phone]  Fax :     [phone]    [facility] office:  [address] [facility], [address]  Phone - [phone]  Fax - [phone]

## 2022-08-13 NOTE — PROGRESS NOTES
0700  High BP > anti HTN medication resumed and PRN Labetalol 10 mg IV given once,   Glucose 80s-165>  insuline Drip been off since 20:00hr > NP transition him to SSI Q 6 hr witth Lantus 10 units OD > Gap up to 16 in am > NP ordered 500 ml NS bolus then started in NS at 50 ml/hr > another 6 units of Lantus given and NP agreed to give him 2 units Lispro at 5 am,   Bedside and Verbal shift change report given to Lenard Victor RN (oncoming nurse) by Ernestina Diallo RN (offgoing nurse). Report included the following information SBAR, Kardex, Intake/Output, MAR, Accordion, Recent Results, Med Rec Status and Cardiac Rhythm SR.         Problem: Non-Violent Restraints  Goal: Removal from restraints as soon as assessed to be safe  Outcome: Progressing Towards Goal  Goal: No harm/injury to patient while restraints in use  Outcome: Progressing Towards Goal  Goal: Patient's dignity will be maintained  Outcome: Progressing Towards Goal  Goal: Patient Interventions  Outcome: Progressing Towards Goal     Problem: DKA: Day 2  Goal: Off Pathway (Use only if patient is Off Pathway)  Outcome: Progressing Towards Goal  Goal: Activity/Safety  Outcome: Progressing Towards Goal  Goal: Consults, if ordered  Outcome: Progressing Towards Goal  Goal: Diagnostic Test/Procedures  Outcome: Progressing Towards Goal  Goal: Nutrition/Diet  Outcome: Progressing Towards Goal  Goal: Discharge Planning  Outcome: Progressing Towards Goal  Goal: Medications  Outcome: Progressing Towards Goal  Goal: Respiratory  Outcome: Progressing Towards Goal  Goal: Treatments/Interventions/Procedures  Outcome: Progressing Towards Goal  Goal: Psychosocial  Outcome: Progressing Towards Goal  Goal: *Acidosis resolved  Outcome: Progressing Towards Goal  Goal: *Tolerating diet  Outcome: Progressing Towards Goal  Goal: *Demonstrates progressive activity  Outcome: Progressing Towards Goal  Goal: *Blood glucose 80 to 180 mg/dl  Outcome: Progressing Towards Goal     Problem: Ventilator Management  Goal: *Adequate oxygenation and ventilation  Outcome: Progressing Towards Goal  Goal: *Patient maintains clear airway/free of aspiration  Outcome: Progressing Towards Goal  Goal: *Absence of infection signs and symptoms  Outcome: Progressing Towards Goal  Goal: *Normal spontaneous ventilation  Outcome: Progressing Towards Goal     Problem: Pressure Injury - Risk of  Goal: *Prevention of pressure injury  Description: Document Corey Scale and appropriate interventions in the flowsheet. Outcome: Progressing Towards Goal  Note: Pressure Injury Interventions: Activity Interventions: Assess need for specialty bed, Increase time out of bed, Pressure redistribution bed/mattress(bed type)    Mobility Interventions: Assess need for specialty bed, Float heels, HOB 30 degrees or less, Pressure redistribution bed/mattress (bed type), Suspension boots, Turn and reposition approx. every two hours(pillow and wedges)    Nutrition Interventions: Document food/fluid/supplement intake    Friction and Shear Interventions: Apply protective barrier, creams and emollients, Feet elevated on foot rest, Foam dressings/transparent film/skin sealants, HOB 30 degrees or less, Lift team/patient mobility team, Lift sheet, Minimize layers, Transfer aides:transfer board/Gabrielal lift/ceiling lift, Transferring/repositioning devices                Problem: Falls - Risk of  Goal: *Absence of Falls  Description: Document Shannon Fall Risk and appropriate interventions in the flowsheet.   Outcome: Progressing Towards Goal  Note: Fall Risk Interventions:       Mentation Interventions: Adequate sleep, hydration, pain control, Bed/chair exit alarm, Door open when patient unattended, Evaluate medications/consider consulting pharmacy, Increase mobility, More frequent rounding, Reorient patient, Room close to nurse's station, Toileting rounds, Update white board    Medication Interventions: Assess postural VS orthostatic hypotension, Bed/chair exit alarm, Evaluate medications/consider consulting pharmacy, Patient to call before getting OOB    Elimination Interventions: Bed/chair exit alarm, Call light in reach, Patient to call for help with toileting needs, Toilet paper/wipes in reach, Toileting schedule/hourly rounds, Urinal in reach, Stay With Me (per policy)

## 2022-08-13 NOTE — PROGRESS NOTES
CRITICAL CARE NOTE      Name: Emily Hopper   : 1980   MRN: [id number]   Date: 2022      REASON FOR ICU ADMISSION: Diabetes ketoacidosis, Acute Respiratory Failure    PRINCIPAL ICU DIAGNOSIS   Diabetes ketoacidosis  Acute on chronic renal failure  Acute hypoxic respiratory failure    BRIEF PATIENT SUMMARY   45 y/o male history of poorly controlled T1DM, bipolar disorder, and CKD 3 admitted  for acute hypoxic respiratory failure in the setting of severe diabetic ketoacidosis. EMS called for generalized lethargy and increased work of breathing. Glucose > 600, VBG: PH 6.83,, 2 L NS administered, initially was maintaining his airway, then proceeded to have minimal respirations, obtunded, placed on mechanical ventilation. Insulin infusion initiated for DKA, antibiotics d/t concern for PNA. COMPREHENSIVE ASSESSMENT & PLAN:SYSTEM BASED     24 HOUR EVENTS: transitioned off insulin gtt. Extubated. Off vasopressors. NEUROLOGICAL: Hx of Bipolar Disorder, neuropathy   Monitor  Sedative meds DC'd  PULMONOLOGY:   Acute respiratory failure requiring mechanical intubation .  Extubated   CXR with BLL infiltrates and/or effusions    Extubated under my supervision this AM  Monitor respiratory status closely post extubation  Supplemental O2 as needed to maintain SpO2 > 90%  CARDIOVASCULAR:   Elevated Troponin    Cardiac/hemodynamic monitoring  Cardiology following  Echocardiogram   Recheck trop I this afternoon    GASTROINTESTINAL: Hx of severe erosive esophagitis, gastric erosions   Advance diet as able  Cont Protonix    RENAL/ELECTROLYTE/FLUIDS: Hx of CKD3 and urinary retention   Acute on chronic Renal Failure  AG metabolic acidosis    Nephrology following  IVFs adjusted  Recheck BMP this afternoon  Track chemistry panels  Correct electrolytes as indicated    ENDOCRINE: Hx of Type 1 Diabetes   Diabetes ketoacidosis in poorly controlled Type 1 Diabetic ( f/b Dr. John Carter in insulin pump and Dexcom)  DKA resolved    Cont Lantus  Cont SSI  Change to ACHS when diet ordered    HEMATOLOGY/ONCOLOGY:   Acute on chronic anemia in setting of chronic renal failure  No signs of active bleeding  Cont SCDs for DVT prophylaxis  Consider SQ heparin  if not ambulating     INFECTIOUS DISEASE:   Concern for possible PNA, continue empiric antibiotics    ANTIBIOTICS TO DATE:  Levaquin (-current)  Cefepime (-)    CULTURES TO DATE:  () Blood Cultures- pending  () Urine Culture- pending    Recheck CXR in AM     ICU DAILY CHECKLIST     Code Status:FULL  DVT Prophylaxis:SCD  T/L/D: Tubes: None  Lines: Peripheral IV  Drains: Beard Catheter  SUP: Protonix  Diet: NPO  Activity Level:Bedrest  ABCDEF Bundle/Checklist Completed:Yes  Disposition: Stay in ICU  Multidisciplinary Rounds Completed:  N/A  Goals of Care Discussion/Palliative: N/A  Patient/Family Updated: N/A          SUBJECTIVE   Passed SBT and + F/C. Extubated under my supervision  AM. Tolerating well. NAD    OBJECTIVE     Labs and Data: Reviewed 22  Medications: Reviewed 22  Imaging: Reviewed 22         Visit Vitals  BP (!) 140/77   Pulse 85   Temp 98.1 °F (36.7 °C)   Resp 17   Ht 5' 9\" (1.753 m)   Wt 89.4 kg (197 lb)   SpO2 95%   BMI 29.09 kg/m²    O2 Flow Rate (L/min): 3 l/min O2 Device: Nasal cannula Temp (24hrs), Av.3 °F (36.3 °C), Min:96.6 °F (35.9 °C), Max:98.4 °F (36.9 °C)           Intake/Output:     Intake/Output Summary (Last 24 hours) at 2022 1456  Last data filed at 2022 1400  Gross per 24 hour   Intake 3019.07 ml   Output 2530 ml   Net 489.07 ml     EXAM:  RASS 0, + F/C. Comfortable post extubation  HEENT WNL  No JVD noted  Chest clear anteriorly  Sinus rhythm, reg, no M  NABS, soft,   No edema, ext warm  No focal neuro deficits    CXR:  bibasilar infiltrates +/- effusions      ECHO ADULT COMPLETE 10/23/2020 10/23/2020    Interpretation Summary  · LV: Estimated LVEF is 55 - 60%.  Visually measured ejection fraction. Normal cavity size, wall thickness and systolic function (ejection fraction normal). · MV: Mild mitral valve regurgitation is present. Signed by: Wilbur Erwin MD on 10/23/2020  5:40 PM         CRITICAL CARE DOCUMENTATION  I had a face to face encounter with the patient, reviewed and interpreted patient data including clinical events, labs, images, vital signs, I/O's, and examined patient. I have discussed the case and the plan and management of the patient's care with the consulting services, the bedside nurses and the respiratory therapist.      NOTE OF PERSONAL INVOLVEMENT IN CARE   This patient has a high probability of imminent, clinically significant deterioration, which requires the highest level of preparedness to intervene urgently. I participated in the decision-making and personally managed or directed the management of the following life and organ supporting interventions that required my frequent assessment to treat or prevent imminent deterioration. I personally spent 45 minutes of critical care time. This is time spent at this critically ill patient's bedside actively involved in patient care as well as the coordination of care. This does not include any procedural time which has been billed separately.     Sangita Gillette, [facility],[address]  [phone]  8/13/2022

## 2022-08-13 NOTE — PROGRESS NOTES
[facility]  [address]  [phone]  [email or web address]  CARDIOLOGY PROGRESS NOTE    8/13/2022 10:27 AM    Admit Date: 8/11/2022    Admit Diagnosis:   DKA (diabetic ketoacidosis) ([address] [E11.10]    Subjective: Dyan Meadows has been extubated since yesterdays consult. Prior to admission he denies events of [place]ST[place] or chest pain. Main complaint at this time is throat discomfort.      Visit Vitals  BP (!) 151/86   Pulse 84   Temp 97.3 °F (36.3 °C)   Resp (!) 32   Ht 5' 9\" (1.753 m)   Wt 89.4 kg (197 lb)   SpO2 95%   BMI 29.09 kg/m²       Current Facility-Administered Medications   Medication Dose Route Frequency    labetaloL (NORMODYNE;TRANDATE) injection 10 mg  10 mg IntraVENous Q4H PRN    amLODIPine (NORVASC) tablet 10 mg  10 mg Per NG tube DAILY    carvediloL (COREG) tablet 12.5 mg  12.5 mg Per NG tube BID    dextrose 10% infusion 0-250 mL  0-250 mL IntraVENous PRN    [START ON 8/14/2022] insulin glargine (LANTUS) injection 16 Units  16 Units SubCUTAneous DAILY    [START ON 8/14/2022] pantoprazole (PROTONIX) 40 mg in 0.9% sodium chloride 10 mL injection  40 mg IntraVENous Q24H    insulin lispro (HUMALOG) injection   SubCUTAneous Q6H    glucose chewable tablet 16 g  4 Tablet Oral PRN    glucagon (GLUCAGEN) injection 1 mg  1 mg IntraMUSCular PRN    dextrose 10% infusion 0-250 mL  0-250 mL IntraVENous PRN    lactated Ringers 1,000 mL with potassium chloride 40 mEq infusion   IntraVENous CONTINUOUS    sodium chloride (NS) flush 5-40 mL  5-40 mL IntraVENous Q8H    sodium chloride (NS) flush 5-40 mL  5-40 mL IntraVENous PRN    acetaminophen (TYLENOL) tablet 650 mg  650 mg Oral Q6H PRN    Or    acetaminophen (TYLENOL) suppository 650 mg  650 mg Rectal Q6H PRN    polyethylene glycol (MIRALAX) packet 17 g  17 g Oral DAILY PRN    ondansetron (ZOFRAN) injection 4 mg  4 mg IntraVENous Q6H PRN    chlorhexidine (ORAL CARE KIT) 0.12 % mouthwash 15 mL 15 mL Oral Q12H    [START ON 8/14/2022] levoFLOXacin (LEVAQUIN) 500 mg in D5W IVPB  500 mg IntraVENous Q48H    0.9% sodium chloride infusion 250 mL  250 mL IntraVENous PRN    balsam peru-castor oiL (VENELEX) ointment   Topical Q12H    alcohol 62% (NOZIN) nasal  1 Ampule  1 Ampule Topical Q12H         Objective:      Physical Exam    General: Well developed, in no acute distress, lethargic, answers questions appropriately cooperative   HEENT: No carotid bruits, no JVD, trach is midline. Heart:  Normal S1/S2 negative S3 or S4. Regular, no murmur, gallop or rub. Respiratory: Diminished bilaterally at bases no rales   Abdomen:   Soft, non-tender, no masses, bowel sounds are active. Extremities:  +1 pedal edema    Neuro: A&Ox3, speech clear, MAEx4   Skin: Skin color is normal. No rashes or lesions. Non diaphoretic  Vascular: 2+ pulses symmetric in all extremities      Data Review:   Recent Labs     08/13/22  0338 08/12/22  1742 08/12/22  1015   WBC 13.1* 10.9 9.3   HGB 9.2* 8.1* 6.8*   HCT 26.6* 23.4* 19.6*    384 376     Recent Labs     08/13/22  0338 08/12/22  2154 08/12/22  1742 08/12/22  1019 08/12/22  0346 08/12/22  0246 08/12/22  0041 08/11/22  2332    144 145   < > 136  --  136 132*   K 2.9* 3.3* 3.0*   < > 4.3  --  5.2* 5.9*   * 110* 110*   < > 105  --  104 99   CO2 18* 21 22   < > 9*  --  10* 8*   * 98 93   < > 646*   < > 756* 812*   BUN 75* 77* 77*   < > 82*  --  82* 84*   CREA 5.43* 5.39* 5.50*   < > 5.44*  --  5.58* 5.93*   CA 8.0* 7.6* 7.8*   < > 7.8*  --  8.0* 7.9*   MG 2.3  --   --   --  2.7*  --  2.9*  --    PHOS 4.5  --   --   --  7.5*  --  9.1*  --    ALB  --   --   --   --   --   --   --  1.9*   TBILI  --   --   --   --   --   --   --  0.4   ALT  --   --   --   --   --   --   --  32    < > = values in this interval not displayed. No results for input(s): TROIQ, CPK, CKMB in the last 72 hours.       Intake/Output Summary (Last 24 hours) at 8/13/2022 1027  Last data filed at 8/13/2022 0800  Gross per 24 hour   Intake 5094.65 ml   Output 2020 ml   Net 3074.65 ml        Telemetry: NSR   EKG:  Cxray:    Assessment:     Active Problems:    DKA (diabetic ketoacidosis) ([facility] [address] (12/2/2021)        Plan:     Type II NSTEMI: In setting of DKA/FELECIA/Anemia and respiratory failure, Full ECHO ordered. Quick view at bedside EF 40% +/-5%, no pericardial effusion. Started yesterday on Coreg. No ACEI/ARB/ARNI due to FELECIA. Will consider BRIGHT NST once patient condition improves. Bradycardia: Resolved along with normalization of QT interval. Stable to remain on Coreg. 3.   HTN: Controlled continue current therapy.    Will continue to follow     Bonnie Mckeon NP

## 2022-08-13 NOTE — PROGRESS NOTES
Bedside shift change report given to Michelle Fleming   (oncoming nurse) by Alley Larose   (offgoing nurse). Report included the following information SBAR, Kardex, Intake/Output, MAR, Recent Results, Cardiac Rhythm NSR, and Alarm Parameters .      0930 Pt extubated

## 2022-08-14 ENCOUNTER — APPOINTMENT (OUTPATIENT)
Dept: CT IMAGING | Age: 42
DRG: 420 | End: 2022-08-14
Attending: INTERNAL MEDICINE
Payer: MEDICAID

## 2022-08-14 LAB
ALBUMIN SERPL-MCNC: 2.1 G/DL (ref 3.5–5)
ALBUMIN/GLOB SERPL: 0.7 {RATIO} (ref 1.1–2.2)
ALP SERPL-CCNC: 145 U/L (ref 45–117)
ALT SERPL-CCNC: 25 U/L (ref 12–78)
ANION GAP SERPL CALC-SCNC: 12 MMOL/L (ref 5–15)
AST SERPL-CCNC: 20 U/L (ref 15–37)
BILIRUB SERPL-MCNC: 0.4 MG/DL (ref 0.2–1)
BUN SERPL-MCNC: 66 MG/DL (ref 6–20)
BUN/CREAT SERPL: 13 (ref 12–20)
CALCIUM SERPL-MCNC: 8 MG/DL (ref 8.5–10.1)
CHLORIDE SERPL-SCNC: 109 MMOL/L (ref 97–108)
CO2 SERPL-SCNC: 19 MMOL/L (ref 21–32)
CREAT SERPL-MCNC: 5.05 MG/DL (ref 0.7–1.3)
ECHO AO ROOT DIAM: 2.8 CM
ECHO AO ROOT INDEX: 1.37 CM/M2
ECHO AV AREA PEAK VELOCITY: 2.5 CM2
ECHO AV AREA VTI: 2.4 CM2
ECHO AV AREA/BSA PEAK VELOCITY: 1.2 CM2/M2
ECHO AV AREA/BSA VTI: 1.2 CM2/M2
ECHO AV MEAN GRADIENT: 5 MMHG
ECHO AV MEAN VELOCITY: 1 M/S
ECHO AV PEAK GRADIENT: 8 MMHG
ECHO AV PEAK VELOCITY: 1.4 M/S
ECHO AV VELOCITY RATIO: 0.79
ECHO AV VTI: 31.8 CM
ECHO LA DIAMETER INDEX: 1.51 CM/M2
ECHO LA DIAMETER: 3.1 CM
ECHO LA TO AORTIC ROOT RATIO: 1.11
ECHO LA VOL 4C: 35 ML (ref 18–58)
ECHO LA VOLUME INDEX A4C: 17 ML/M2 (ref 16–34)
ECHO LV EDV A4C: 144 ML
ECHO LV EDV INDEX A4C: 70 ML/M2
ECHO LV EJECTION FRACTION A4C: 36 %
ECHO LV ESV A4C: 92 ML
ECHO LV ESV INDEX A4C: 45 ML/M2
ECHO LV FRACTIONAL SHORTENING: 30 % (ref 28–44)
ECHO LV INTERNAL DIMENSION DIASTOLE INDEX: 2.1 CM/M2
ECHO LV INTERNAL DIMENSION DIASTOLIC: 4.3 CM (ref 4.2–5.9)
ECHO LV INTERNAL DIMENSION SYSTOLIC INDEX: 1.46 CM/M2
ECHO LV INTERNAL DIMENSION SYSTOLIC: 3 CM
ECHO LV IVSD: 1.2 CM (ref 0.6–1)
ECHO LV MASS 2D: 162.9 G (ref 88–224)
ECHO LV MASS INDEX 2D: 79.5 G/M2 (ref 49–115)
ECHO LV POSTERIOR WALL DIASTOLIC: 1 CM (ref 0.6–1)
ECHO LV RELATIVE WALL THICKNESS RATIO: 0.47
ECHO LVOT AREA: 3.1 CM2
ECHO LVOT AV VTI INDEX: 0.78
ECHO LVOT DIAM: 2 CM
ECHO LVOT MEAN GRADIENT: 3 MMHG
ECHO LVOT PEAK GRADIENT: 5 MMHG
ECHO LVOT PEAK VELOCITY: 1.1 M/S
ECHO LVOT STROKE VOLUME INDEX: 38.1 ML/M2
ECHO LVOT SV: 78.2 ML
ECHO LVOT VTI: 24.9 CM
ECHO MV A VELOCITY: 0.85 M/S
ECHO MV AREA VTI: 3.9 CM2
ECHO MV E DECELERATION TIME (DT): 168.2 MS
ECHO MV E VELOCITY: 0.79 M/S
ECHO MV E/A RATIO: 0.93
ECHO MV LVOT VTI INDEX: 0.82
ECHO MV MAX VELOCITY: 1.2 M/S
ECHO MV MEAN GRADIENT: 2 MMHG
ECHO MV MEAN VELOCITY: 0.7 M/S
ECHO MV PEAK GRADIENT: 6 MMHG
ECHO MV VTI: 20.3 CM
ECHO PV MAX VELOCITY: 1.2 M/S
ECHO PV PEAK GRADIENT: 6 MMHG
ECHO RV INTERNAL DIMENSION: 2.9 CM
ECHO TV REGURGITANT MAX VELOCITY: 2.43 M/S
ECHO TV REGURGITANT PEAK GRADIENT: 24 MMHG
ERYTHROCYTE [DISTWIDTH] IN BLOOD BY AUTOMATED COUNT: 16.8 % (ref 11.5–14.5)
GLOBULIN SER CALC-MCNC: 3.2 G/DL (ref 2–4)
GLUCOSE BLD STRIP.AUTO-MCNC: 208 MG/DL (ref 65–117)
GLUCOSE BLD STRIP.AUTO-MCNC: 272 MG/DL (ref 65–117)
GLUCOSE BLD STRIP.AUTO-MCNC: 294 MG/DL (ref 65–117)
GLUCOSE BLD STRIP.AUTO-MCNC: 380 MG/DL (ref 65–117)
GLUCOSE SERPL-MCNC: 252 MG/DL (ref 65–100)
HCT VFR BLD AUTO: 24.5 % (ref 36.6–50.3)
HGB BLD-MCNC: 8 G/DL (ref 12.1–17)
MCH RBC QN AUTO: 28.4 PG (ref 26–34)
MCHC RBC AUTO-ENTMCNC: 32.7 G/DL (ref 30–36.5)
MCV RBC AUTO: 86.9 FL (ref 80–99)
NRBC # BLD: 0 K/UL (ref 0–0.01)
NRBC BLD-RTO: 0 PER 100 WBC
PHOSPHATE SERPL-MCNC: 5.7 MG/DL (ref 2.6–4.7)
PLATELET # BLD AUTO: 325 K/UL (ref 150–400)
PMV BLD AUTO: 10.8 FL (ref 8.9–12.9)
POTASSIUM SERPL-SCNC: 3.8 MMOL/L (ref 3.5–5.1)
PROT SERPL-MCNC: 5.3 G/DL (ref 6.4–8.2)
RBC # BLD AUTO: 2.82 M/UL (ref 4.1–5.7)
SERVICE CMNT-IMP: ABNORMAL
SODIUM SERPL-SCNC: 140 MMOL/L (ref 136–145)
WBC # BLD AUTO: 9.2 K/UL (ref 4.1–11.1)

## 2022-08-14 PROCEDURE — 74011250637 HC RX REV CODE- 250/637: Performed by: NURSE PRACTITIONER

## 2022-08-14 PROCEDURE — 80053 COMPREHEN METABOLIC PANEL: CPT

## 2022-08-14 PROCEDURE — 74011250636 HC RX REV CODE- 250/636: Performed by: INTERNAL MEDICINE

## 2022-08-14 PROCEDURE — 74011250637 HC RX REV CODE- 250/637: Performed by: INTERNAL MEDICINE

## 2022-08-14 PROCEDURE — 74011636637 HC RX REV CODE- 636/637: Performed by: NURSE PRACTITIONER

## 2022-08-14 PROCEDURE — 74011250636 HC RX REV CODE- 250/636: Performed by: NURSE PRACTITIONER

## 2022-08-14 PROCEDURE — 74011250637 HC RX REV CODE- 250/637: Performed by: HOSPITALIST

## 2022-08-14 PROCEDURE — P9045 ALBUMIN (HUMAN), 5%, 250 ML: HCPCS | Performed by: NURSE PRACTITIONER

## 2022-08-14 PROCEDURE — 36415 COLL VENOUS BLD VENIPUNCTURE: CPT

## 2022-08-14 PROCEDURE — 77010033678 HC OXYGEN DAILY

## 2022-08-14 PROCEDURE — 65270000046 HC RM TELEMETRY

## 2022-08-14 PROCEDURE — 70450 CT HEAD/BRAIN W/O DYE: CPT

## 2022-08-14 PROCEDURE — 84100 ASSAY OF PHOSPHORUS: CPT

## 2022-08-14 PROCEDURE — 74011636637 HC RX REV CODE- 636/637: Performed by: INTERNAL MEDICINE

## 2022-08-14 PROCEDURE — 93306 TTE W/DOPPLER COMPLETE: CPT | Performed by: INTERNAL MEDICINE

## 2022-08-14 PROCEDURE — 74011000250 HC RX REV CODE- 250: Performed by: NURSE PRACTITIONER

## 2022-08-14 PROCEDURE — 85027 COMPLETE CBC AUTOMATED: CPT

## 2022-08-14 PROCEDURE — 82962 GLUCOSE BLOOD TEST: CPT

## 2022-08-14 RX ORDER — HEPARIN SODIUM 5000 [USP'U]/ML
5000 INJECTION, SOLUTION INTRAVENOUS; SUBCUTANEOUS EVERY 8 HOURS
Status: DISCONTINUED | OUTPATIENT
Start: 2022-08-14 | End: 2022-08-20 | Stop reason: HOSPADM

## 2022-08-14 RX ORDER — HYDRALAZINE HYDROCHLORIDE 20 MG/ML
20 INJECTION INTRAMUSCULAR; INTRAVENOUS
Status: DISCONTINUED | OUTPATIENT
Start: 2022-08-14 | End: 2022-08-20 | Stop reason: HOSPADM

## 2022-08-14 RX ORDER — CLONIDINE 0.1 MG/24H
1 PATCH, EXTENDED RELEASE TRANSDERMAL
Status: DISCONTINUED | OUTPATIENT
Start: 2022-08-14 | End: 2022-08-18

## 2022-08-14 RX ORDER — PANTOPRAZOLE SODIUM 40 MG/1
40 TABLET, DELAYED RELEASE ORAL
Status: DISCONTINUED | OUTPATIENT
Start: 2022-08-15 | End: 2022-08-20 | Stop reason: HOSPADM

## 2022-08-14 RX ORDER — ROSUVASTATIN CALCIUM 10 MG/1
10 TABLET, COATED ORAL
Status: DISCONTINUED | OUTPATIENT
Start: 2022-08-14 | End: 2022-08-20 | Stop reason: HOSPADM

## 2022-08-14 RX ORDER — LABETALOL HYDROCHLORIDE 5 MG/ML
20 INJECTION, SOLUTION INTRAVENOUS
Status: DISCONTINUED | OUTPATIENT
Start: 2022-08-14 | End: 2022-08-20 | Stop reason: HOSPADM

## 2022-08-14 RX ORDER — ALBUMIN HUMAN 50 G/1000ML
25 SOLUTION INTRAVENOUS ONCE
Status: COMPLETED | OUTPATIENT
Start: 2022-08-14 | End: 2022-08-14

## 2022-08-14 RX ORDER — SODIUM BICARBONATE 650 MG/1
650 TABLET ORAL 3 TIMES DAILY
Status: DISCONTINUED | OUTPATIENT
Start: 2022-08-14 | End: 2022-08-20 | Stop reason: HOSPADM

## 2022-08-14 RX ORDER — HYDRALAZINE HYDROCHLORIDE 20 MG/ML
20 INJECTION INTRAMUSCULAR; INTRAVENOUS ONCE
Status: COMPLETED | OUTPATIENT
Start: 2022-08-14 | End: 2022-08-14

## 2022-08-14 RX ORDER — CLONIDINE HYDROCHLORIDE 0.1 MG/1
0.1 TABLET ORAL
Status: COMPLETED | OUTPATIENT
Start: 2022-08-14 | End: 2022-08-14

## 2022-08-14 RX ORDER — ASPIRIN 81 MG/1
81 TABLET ORAL DAILY
Status: DISCONTINUED | OUTPATIENT
Start: 2022-08-14 | End: 2022-08-20 | Stop reason: HOSPADM

## 2022-08-14 RX ADMIN — HEPARIN SODIUM 5000 UNITS: 5000 INJECTION INTRAVENOUS; SUBCUTANEOUS at 09:42

## 2022-08-14 RX ADMIN — ACETAMINOPHEN 650 MG: 325 TABLET ORAL at 17:30

## 2022-08-14 RX ADMIN — ROSUVASTATIN CALCIUM 10 MG: 10 TABLET, FILM COATED ORAL at 21:05

## 2022-08-14 RX ADMIN — CASTOR OIL AND BALSAM, PERU: 788; 87 OINTMENT TOPICAL at 21:04

## 2022-08-14 RX ADMIN — ACETAMINOPHEN 650 MG: 325 TABLET ORAL at 04:06

## 2022-08-14 RX ADMIN — Medication 10 UNITS: at 09:48

## 2022-08-14 RX ADMIN — CHLORHEXIDINE GLUCONATE 15 ML: 1.2 RINSE ORAL at 21:08

## 2022-08-14 RX ADMIN — SODIUM CHLORIDE, PRESERVATIVE FREE 10 ML: 5 INJECTION INTRAVENOUS at 21:05

## 2022-08-14 RX ADMIN — CASTOR OIL AND BALSAM, PERU: 788; 87 OINTMENT TOPICAL at 09:42

## 2022-08-14 RX ADMIN — SODIUM CHLORIDE, PRESERVATIVE FREE 10 ML: 5 INJECTION INTRAVENOUS at 16:11

## 2022-08-14 RX ADMIN — SODIUM BICARBONATE 650 MG: 650 TABLET ORAL at 20:59

## 2022-08-14 RX ADMIN — CLONIDINE HYDROCHLORIDE 0.1 MG: 0.1 TABLET ORAL at 09:41

## 2022-08-14 RX ADMIN — Medication 5 UNITS: at 12:41

## 2022-08-14 RX ADMIN — SODIUM BICARBONATE 650 MG: 650 TABLET ORAL at 16:09

## 2022-08-14 RX ADMIN — Medication 2 UNITS: at 21:04

## 2022-08-14 RX ADMIN — AMLODIPINE BESYLATE 10 MG: 5 TABLET ORAL at 09:41

## 2022-08-14 RX ADMIN — ASPIRIN 81 MG: 81 TABLET, COATED ORAL at 10:21

## 2022-08-14 RX ADMIN — CARVEDILOL 12.5 MG: 12.5 TABLET, FILM COATED ORAL at 09:42

## 2022-08-14 RX ADMIN — CARVEDILOL 12.5 MG: 12.5 TABLET, FILM COATED ORAL at 17:31

## 2022-08-14 RX ADMIN — Medication 1 AMPULE: at 09:35

## 2022-08-14 RX ADMIN — INSULIN GLARGINE 16 UNITS: 100 INJECTION, SOLUTION SUBCUTANEOUS at 09:41

## 2022-08-14 RX ADMIN — HEPARIN SODIUM 5000 UNITS: 5000 INJECTION INTRAVENOUS; SUBCUTANEOUS at 17:31

## 2022-08-14 RX ADMIN — ONDANSETRON 4 MG: 2 INJECTION INTRAMUSCULAR; INTRAVENOUS at 09:45

## 2022-08-14 RX ADMIN — Medication 5 UNITS: at 17:31

## 2022-08-14 RX ADMIN — ALBUMIN (HUMAN) 25 G: 12.5 INJECTION, SOLUTION INTRAVENOUS at 06:16

## 2022-08-14 RX ADMIN — HYDRALAZINE HYDROCHLORIDE 20 MG: 20 INJECTION, SOLUTION INTRAMUSCULAR; INTRAVENOUS at 04:06

## 2022-08-14 RX ADMIN — Medication 1 AMPULE: at 21:03

## 2022-08-14 RX ADMIN — LABETALOL HYDROCHLORIDE 10 MG: 5 INJECTION INTRAVENOUS at 01:07

## 2022-08-14 NOTE — PROGRESS NOTES
[facility]  [address]  [phone]  [email or web address]  CARDIOLOGY PROGRESS NOTE    8/14/2022 10:27 AM    Admit Date: 8/11/2022    Admit Diagnosis:   DKA (diabetic ketoacidosis) ([address] [E11.10]    Subjective: Ruby Avendano presents hypertensive this Am and has slurred speech. Denies CP or shortness of breath.       Visit Vitals  BP (!) 183/83   Pulse 84   Temp 97.2 °F (36.2 °C)   Resp 24   Ht 5' 9\" (1.753 m)   Wt 89.1 kg (196 lb 6.9 oz)   SpO2 93%   BMI 29.01 kg/m²       Current Facility-Administered Medications   Medication Dose Route Frequency    labetaloL (NORMODYNE;TRANDATE) injection 10 mg  10 mg IntraVENous Q4H PRN    dextrose 10% infusion 0-250 mL  0-250 mL IntraVENous PRN    insulin glargine (LANTUS) injection 16 Units  16 Units SubCUTAneous DAILY    pantoprazole (PROTONIX) 40 mg in 0.9% sodium chloride 10 mL injection  40 mg IntraVENous Q24H    glucose chewable tablet 16 g  4 Tablet Oral PRN    glucagon (GLUCAGEN) injection 1 mg  1 mg IntraMUSCular PRN    dextrose 10% infusion 0-250 mL  0-250 mL IntraVENous PRN    carvediloL (COREG) tablet 12.5 mg  12.5 mg Oral BID    amLODIPine (NORVASC) tablet 10 mg  10 mg Oral DAILY    insulin lispro (HUMALOG) injection   SubCUTAneous AC&HS    senna-docusate (PERICOLACE) 8.6-50 mg per tablet 1 Tablet  1 Tablet Oral DAILY    sodium chloride (NS) flush 5-40 mL  5-40 mL IntraVENous Q8H    sodium chloride (NS) flush 5-40 mL  5-40 mL IntraVENous PRN    acetaminophen (TYLENOL) tablet 650 mg  650 mg Oral Q6H PRN    Or    acetaminophen (TYLENOL) suppository 650 mg  650 mg Rectal Q6H PRN    polyethylene glycol (MIRALAX) packet 17 g  17 g Oral DAILY PRN    ondansetron (ZOFRAN) injection 4 mg  4 mg IntraVENous Q6H PRN    chlorhexidine (ORAL CARE KIT) 0.12 % mouthwash 15 mL  15 mL Oral Q12H    levoFLOXacin (LEVAQUIN) 500 mg in D5W IVPB  500 mg IntraVENous Q48H    0.9% sodium chloride infusion 250 mL 250 mL IntraVENous PRN    balsam peru-castor oiL (VENELEX) ointment   Topical Q12H    alcohol 62% (NOZIN) nasal  1 Ampule  1 Ampule Topical Q12H         Objective:      Physical Exam    General: Well developed, in no acute distress, lethargic, answers questions appropriately cooperative   HEENT: No carotid bruits, no JVD, trach is midline. Heart:  Normal S1/S2 negative S3 or S4. Regular, no murmur, gallop or rub. Respiratory: Diminished bilaterally at bases no rales   Abdomen:   Soft, non-tender, no masses, bowel sounds are active. Extremities:  +1 pedal edema    Neuro: A&Ox3, speech slightly slurred this Am (Dr Janet Cleveland aware) , MAEx4   Skin: Skin color is normal. No rashes or lesions. Non diaphoretic  Vascular: 2+ pulses symmetric in all extremities      Data Review:   Recent Labs     08/14/22  0359 08/13/22  0338 08/12/22  1742   WBC 9.2 13.1* 10.9   HGB 8.0* 9.2* 8.1*   HCT 24.5* 26.6* 23.4*    390 384     Recent Labs     08/14/22  0359 08/13/22  1602 08/13/22  0338 08/12/22  1019 08/12/22  0346 08/12/22  0246 08/12/22  0041 08/11/22  2332 08/11/22  2332    146* 143   < > 136  --  136  --  132*   K 3.8 3.8 2.9*   < > 4.3  --  5.2*  --  5.9*   * 113* 109*   < > 105  --  104  --  99   CO2 19* 21 18*   < > 9*  --  10*  --  8*   * 161* 187*   < > 646*   < > 756*  --  812*   BUN 66* 73* 75*   < > 82*  --  82*  --  84*   CREA 5.05* 5.37* 5.43*   < > 5.44*  --  5.58*  --  5.93*   CA 8.0* 7.7* 8.0*   < > 7.8*  --  8.0*  --  7.9*   MG  --   --  2.3  --  2.7*  --  2.9*  --   --    PHOS 5.7*  --  4.5  --  7.5*  --  9.1*   < >  --    ALB 2.1*  --   --   --   --   --   --   --  1.9*   TBILI 0.4  --   --   --   --   --   --   --  0.4   ALT 25  --   --   --   --   --   --   --  32    < > = values in this interval not displayed. No results for input(s): TROIQ, CPK, CKMB in the last 72 hours.       Intake/Output Summary (Last 24 hours) at 8/14/2022 9123  Last data filed at 8/14/2022 0730  Gross per 24 hour   Intake 2965 ml   Output 2950 ml   Net 15 ml        Telemetry: NSR   EKG:  Cxray:    Assessment:     Active Problems:    DKA (diabetic ketoacidosis) ([address] (12/2/2021)      Plan:     Type II NSTEMI: In setting of DKA/FELECIA/Anemia and respiratory failure. Trop trending down. ECHO remains pending     Bradycardia: Resolved along with normalization of QT interval. Stable to remain on Coreg. 3.   HTN: Elevated this Am, discussed with Dr Emi Christiansen,     No further recommendations from cardiology, signing off.      Enid Mauro NP

## 2022-08-14 NOTE — PROGRESS NOTES
Bedside shift change report given to Betito Fontaine (oncoming nurse) by Taylor Banegas (offgoing nurse). Report included the following information SBAR, Kardex, Intake/Output, MAR, Recent Results, and Cardiac Rhythm NSR .     0945 Pt C/O nausea after breakfast. Vomited small amt undigested food. Medicated with zofran    1145 Beard cath dc'd at 1100 Pt voided.  Up to chair with minimal assist

## 2022-08-14 NOTE — PROGRESS NOTES
CRITICAL CARE NOTE      Name: Topher Roa   : 1980   MRN: [id number]   Date: 2022      REASON FOR ICU ADMISSION: Diabetes ketoacidosis, Acute Respiratory Failure    PRINCIPAL ICU DIAGNOSIS   Diabetes ketoacidosis  Acute on chronic renal failure  Acute hypoxic respiratory failure    BRIEF PATIENT SUMMARY   45 y/o male history of poorly controlled T1DM, bipolar disorder, and CKD 3 admitted  for acute hypoxic respiratory failure in the setting of severe diabetic ketoacidosis. EMS called for generalized lethargy and increased work of breathing. Glucose > 600, VBG: PH 6.83,, 2 L NS administered, initially was maintaining his airway, then proceeded to have minimal respirations, obtunded, placed on mechanical ventilation. Insulin infusion initiated for DKA, antibiotics d/t concern for PNA. ASSESSMENT/PLAN       NEUROLOGICAL: Hx of Bipolar Disorder, neuropathy   Slurred speech - no other focal deficits noted   CT head ordered   Monitor  Avoid all sedating meds  PULMONOLOGY:   Acute respiratory failure requiring mechanical intubation .  Extubated   CXR with BLL infiltrates and/or effusions   CXR     Extubated under my supervision this AM  Monitor respiratory status closely post extubation  Supplemental O2 as needed to maintain SpO2 > 90%  CARDIOVASCULAR:   Elevated Troponin  Elevated BNP  Severe hypertension  Cont cardiac/hemodynamic monitoring  Echocardiogram  - results pending  Resume clonidine patch   Clonidine 0.1 mg given AM   PRN hydralazine to maintain SBP < 160 mmHg      GASTROINTESTINAL: Hx of severe erosive esophagitis, gastric erosions   Advance diet as able  Cont PO Protonix    RENAL/ELECTROLYTE/FLUIDS: Hx of CKD3 and urinary retention   Acute on chronic Renal Failure - nonoliguric  AG metabolic acidosis, resolved    Nephrology following  IVFs stopped   Track chemistry panels  Correct electrolytes as indicated  DC hansen catheter     ENDOCRINE: Hx of Type 1 Diabetes   Poorly controlled Type 1 Diabetic ( f/b Dr. Joe Teague in insulin pump and Dexcom)  DKA resolved    Cont Lantus  Cont SSI  Changed to [facility]     HEMATOLOGY/ONCOLOGY:   Acute on chronic anemia in setting of chronic renal failure  No signs of active bleeding  Cont SCDs for DVT prophylaxis  Initiate SQ heparin . Continue until fully ambulatory    INFECTIOUS DISEASE:   Concern for possible PNA, complete empiric antibiotics    ANTIBIOTICS TO DATE:  Levaquin (-current). Stop date ordered  Cefepime (-)    CULTURES TO DATE:  () Blood Cultures- pending  () Urine Culture- UA negative      ICU DAILY CHECKLIST     Code Status:FULL  DVT Prophylaxis:SCD, SQH  T/L/D: Tubes: None  Lines: Peripheral IV  Drains: None  SUP: Protonix  Diet: Carb restricted diet  Activity Level: advance as tolerated  ABCDEF Bundle/Checklist Completed:Yes  Disposition: Transfer to non-ICU bed - discussed with Dr Sayra Forman Completed:  N/A  Goals of Care Discussion/Palliative: N/A  Patient/Family Updated: N/A          SUBJECTIVE   Slurred speech. No distress.  Cognition intact    OBJECTIVE     Labs and Data: Reviewed 22  Medications: Reviewed 22  Imaging: Reviewed 22         Visit Vitals  BP (!) 169/87 (BP 1 Location: Right arm)   Pulse 85   Temp 97.2 °F (36.2 °C)   Resp 19   Ht 5' 9\" (1.753 m)   Wt 89.1 kg (196 lb 6.9 oz)   SpO2 91%   BMI 29.01 kg/m²    O2 Flow Rate (L/min): 4 l/min O2 Device: Nasal cannula Temp (24hrs), Av.9 °F (36.6 °C), Min:97 °F (36.1 °C), Max:98.4 °F (36.9 °C)           Intake/Output:     Intake/Output Summary (Last 24 hours) at 2022 1147  Last data filed at 2022 1144  Gross per 24 hour   Intake 2965 ml   Output 3300 ml   Net -335 ml       EXAM:  RASS 0, + F/C. NC 4 LPM  HEENT WNL  No JVD noted  Chest few rhonchi, no wheezes  Sinus rhythm, reg, no M  NABS, soft,   No edema, ext warm  No focal neuro deficits    CXR: 08/13 CM, hazy B infiltrates, suspect mild edema           CRITICAL CARE DOCUMENTATION  I had a face to face encounter with the patient, reviewed and interpreted patient data including clinical events, labs, images, vital signs, I/O's, and examined patient. I have discussed the case and the plan and management of the patient's care with the consulting services, the bedside nurses and the respiratory therapist.      NOTE OF PERSONAL INVOLVEMENT IN CARE   This patient has a high probability of imminent, clinically significant deterioration, which requires the highest level of preparedness to intervene urgently. I participated in the decision-making and personally managed or directed the management of the following life and organ supporting interventions that required my frequent assessment to treat or prevent imminent deterioration. I personally spent 35 minutes of critical care time. This is time spent at this critically ill patient's bedside actively involved in patient care as well as the coordination of care. This does not include any procedural time which has been billed separately.     Beatriz Clements, [facility],[address]  [phone]  8/14/2022

## 2022-08-14 NOTE — PROGRESS NOTES
41/m admitted to ICU with DKA, Acute respiratory failure needing ventilation, intubated 8/12, extubated on 8/13. Also being treated for pneumonia. Has FELECIA, nephrology following, may need HD soon. Continue with current management for now. Non billable rounding.

## 2022-08-14 NOTE — PROGRESS NOTES
[id number]  Patient complained from Right lower Quadrant 7/10 tender, sharp and shooting constant > started an hour ago after eat, no temp however patient feel cold and freezing despite warm temp in room, sat started to drop down to 86-89% on NC 3 l/min > I/S started 1500 ml > sat up to 94 then drop again > NP already updated Abd KUB ordered and Tech at bedside > CXR added > showed Gaseous distention of bowel without dilation or other acute  findings. Small bilateral pleural effusions and suspected congestive failure  with pulmonary edema > NP updated >  Pericolace started, NP ok with Tylenol for now, Continue with I/S, BP > 160 mmHg > Labetalol 10 mg IV PRN given     0700  Albumin 5% 25 gram given at night that help his UOP, anther 25 mg started at at 6 am, still -200 mmHg > not responding to Labetalol 10 mg IV PRN X 2> Hydralazine 20 mg IV once order given > -160 mmHg,    Bedside and Verbal shift change report given to Arun Kinsey RN (oncoming nurse) by Camila Dillard RN (offgoing nurse).  Report included the following information SBAR, Kardex, Intake/Output, MAR, Accordion, Recent Results, Med Rec Status and Cardiac Rhythm SR.

## 2022-08-14 NOTE — PROGRESS NOTES
[address] [facility]  YOB: 1980          Assessment & Plan:     FELECIA stage III on CKD stage IV(secondary to intravenous volume depletion)  - cr stable but 1.4 lit urine and +Ve balance  CKD stage IV(baseline creatinine 2.8-3.5)(secondary to uncontrolled diabetes)  Hypokalemia: total Body K depletion  Anion gap metabolic acidosis sec to DKA  - improving  Non-anion gap metabolic acidosis  Diabetic ketoacidosis  Type 1 diabetes uncontrolled  Hyperphosphatemia  Elevated troponins  Community-acquired pneumonia  Acute hypoxic respiratory failure on mechanical ventilation  PLAN  1. start po bicarb  2. No HD needed yet but heading towards it  3. Labs in am  4. No more ivf  5. Diuretics PRN. 6. Continue norvasc, coreg, clonidine  7. PRN Hydralazine       Subjective:   CC:FELECIA ON CKD  HPI: Patient seen   FELECIA on CKD.  Cr slightly improved  Off vent  Bp high  Acidosis +  ROS: no sob    Current Facility-Administered Medications   Medication Dose Route Frequency    [START ON 8/15/2022] pantoprazole (PROTONIX) tablet 40 mg  40 mg Oral ACB    cloNIDine (CATAPRES) 0.1 mg/24 hr patch 1 Patch  1 Patch TransDERmal Q7D    heparin (porcine) injection 5,000 Units  5,000 Units SubCUTAneous Q8H    rosuvastatin (CRESTOR) tablet 10 mg  10 mg Oral QHS    aspirin delayed-release tablet 81 mg  81 mg Oral DAILY    labetaloL (NORMODYNE;TRANDATE) injection 20 mg  20 mg IntraVENous Q4H PRN    hydrALAZINE (APRESOLINE) 20 mg/mL injection 20 mg  20 mg IntraVENous Q6H PRN    dextrose 10% infusion 0-250 mL  0-250 mL IntraVENous PRN    insulin glargine (LANTUS) injection 16 Units  16 Units SubCUTAneous DAILY    glucose chewable tablet 16 g  4 Tablet Oral PRN    glucagon (GLUCAGEN) injection 1 mg  1 mg IntraMUSCular PRN    dextrose 10% infusion 0-250 mL  0-250 mL IntraVENous PRN    carvediloL (COREG) tablet 12.5 mg  12.5 mg Oral BID    amLODIPine (NORVASC) tablet 10 mg  10 mg Oral DAILY    insulin lispro (HUMALOG) injection   SubCUTAneous AC&HS    senna-docusate (PERICOLACE) 8.6-50 mg per tablet 1 Tablet  1 Tablet Oral DAILY    sodium chloride (NS) flush 5-40 mL  5-40 mL IntraVENous Q8H    sodium chloride (NS) flush 5-40 mL  5-40 mL IntraVENous PRN    acetaminophen (TYLENOL) tablet 650 mg  650 mg Oral Q6H PRN    Or    acetaminophen (TYLENOL) suppository 650 mg  650 mg Rectal Q6H PRN    polyethylene glycol (MIRALAX) packet 17 g  17 g Oral DAILY PRN    ondansetron (ZOFRAN) injection 4 mg  4 mg IntraVENous Q6H PRN    chlorhexidine (ORAL CARE KIT) 0.12 % mouthwash 15 mL  15 mL Oral Q12H    levoFLOXacin (LEVAQUIN) 500 mg in D5W IVPB  500 mg IntraVENous Q48H    0.9% sodium chloride infusion 250 mL  250 mL IntraVENous PRN    balsam peru-castor oiL (VENELEX) ointment   Topical Q12H    alcohol 62% (NOZIN) nasal  1 Ampule  1 Ampule Topical Q12H          Objective:     Vitals:  Blood pressure (!) 169/87, pulse 85, temperature 97.2 °F (36.2 °C), resp. rate 19, height 5' 9\" (1.753 m), weight 89.1 kg (196 lb 6.9 oz), SpO2 91 %. Temp (24hrs), Av.9 °F (36.6 °C), Min:97 °F (36.1 °C), Max:98.4 °F (36.9 °C)      Intake and Output:   0701 -  1900  In: 500 [I.V.:500]  Out: 650 [Urine:650]   1901 -  0700  In: 4277.2 [P.O.:1270; I.V.:2757.2]  Out: 2424 [Urine:4190]    Physical Exam:                GEN:  NAD  NECK:  Supple, no thyromegaly  RESP: decreased bs  b/l, no  wheezing,   CVS: RRR,S1,S2   NEURO: non focal, normal speech  EXT: Edema +nt               ECG/rhythm:    Data Review      No results for input(s): TNIPOC in the last 72 hours. No lab exists for component: ITNL   No results for input(s): CPK, CKMB, TROIQ in the last 72 hours.   Recent Labs     22  0359 22  1602 22  0338 22  2154 22  1742 22  1015 22  0346 22  0246 22  0041 22  2332 22  2332    146* 143   < > 145   < > 136  --  136  -- 132*   K 3.8 3.8 2.9*   < > 3.0*   < > 4.3  --  5.2*  --  5.9*   * 113* 109*   < > 110*   < > 105  --  104  --  99   CO2 19* 21 18*   < > 22   < > 9*  --  10*  --  8*   BUN 66* 73* 75*   < > 77*   < > 82*  --  82*  --  84*   CREA 5.05* 5.37* 5.43*   < > 5.50*   < > 5.44*  --  5.58*  --  5.93*   * 161* 187*   < > 93   < > 646*   < > 756*  --  812*   PHOS 5.7*  --  4.5  --   --   --  7.5*  --  9.1*   < >  --    MG  --   --  2.3  --   --   --  2.7*  --  2.9*  --   --    CA 8.0* 7.7* 8.0*   < > 7.8*   < > 7.8*  --  8.0*  --  7.9*   ALB 2.1*  --   --   --   --   --   --   --   --   --  1.9*   WBC 9.2  --  13.1*  --  10.9   < > 10.9  --   --   --  12.6*   HGB 8.0*  --  9.2*  --  8.1*   < > 7.2*  --   --   --  7.7*   HCT 24.5*  --  26.6*  --  23.4*   < > 22.5*  --   --   --  25.6*     --  390  --  384   < > 352  --   --   --  407*    < > = values in this interval not displayed. No results for input(s): INR, PTP, APTT, INREXT, INREXT in the last 72 hours. Needs: urine analysis, urine sodium, protein and creatinine  Lab Results   Component Value Date/Time    Sodium,urine random 33 07/11/2022 03:10 AM    Creatinine, urine 55.40 07/12/2022 04:09 AM         Discussed with:  Colleague    : Rich Lizama MD  8/14/2022        [facility]:  [email or web address]  Carlos Adria office:  [address], [address] [place] [address],[address]  [address]  Phone: [phone]  Fax :     [phone]    [address] [place] [address] office:  [address]  Phone - [phone]  Fax - [phone]

## 2022-08-15 ENCOUNTER — APPOINTMENT (OUTPATIENT)
Dept: ULTRASOUND IMAGING | Age: 42
DRG: 420 | End: 2022-08-15
Attending: STUDENT IN AN ORGANIZED HEALTH CARE EDUCATION/TRAINING PROGRAM
Payer: MEDICAID

## 2022-08-15 LAB
ALBUMIN SERPL-MCNC: 2.1 G/DL (ref 3.5–5)
ANION GAP SERPL CALC-SCNC: 13 MMOL/L (ref 5–15)
BASOPHILS # BLD: 0 K/UL (ref 0–0.1)
BASOPHILS NFR BLD: 1 % (ref 0–1)
BUN SERPL-MCNC: 61 MG/DL (ref 6–20)
BUN/CREAT SERPL: 13 (ref 12–20)
CALCIUM SERPL-MCNC: 8.4 MG/DL (ref 8.5–10.1)
CHLORIDE SERPL-SCNC: 106 MMOL/L (ref 97–108)
CO2 SERPL-SCNC: 19 MMOL/L (ref 21–32)
CREAT SERPL-MCNC: 4.86 MG/DL (ref 0.7–1.3)
DIFFERENTIAL METHOD BLD: ABNORMAL
EOSINOPHIL # BLD: 0.3 K/UL (ref 0–0.4)
EOSINOPHIL NFR BLD: 5 % (ref 0–7)
ERYTHROCYTE [DISTWIDTH] IN BLOOD BY AUTOMATED COUNT: 16.4 % (ref 11.5–14.5)
GLUCOSE BLD STRIP.AUTO-MCNC: 191 MG/DL (ref 65–117)
GLUCOSE BLD STRIP.AUTO-MCNC: 205 MG/DL (ref 65–117)
GLUCOSE BLD STRIP.AUTO-MCNC: 73 MG/DL (ref 65–117)
GLUCOSE SERPL-MCNC: 201 MG/DL (ref 65–100)
HCT VFR BLD AUTO: 31.5 % (ref 36.6–50.3)
HGB BLD-MCNC: 10.4 G/DL (ref 12.1–17)
IMM GRANULOCYTES # BLD AUTO: 0 K/UL (ref 0–0.04)
IMM GRANULOCYTES NFR BLD AUTO: 1 % (ref 0–0.5)
LYMPHOCYTES # BLD: 1.5 K/UL (ref 0.8–3.5)
LYMPHOCYTES NFR BLD: 24 % (ref 12–49)
MAGNESIUM SERPL-MCNC: 2.3 MG/DL (ref 1.6–2.4)
MCH RBC QN AUTO: 28.9 PG (ref 26–34)
MCHC RBC AUTO-ENTMCNC: 33 G/DL (ref 30–36.5)
MCV RBC AUTO: 87.5 FL (ref 80–99)
MONOCYTES # BLD: 0.5 K/UL (ref 0–1)
MONOCYTES NFR BLD: 9 % (ref 5–13)
NEUTS SEG # BLD: 3.8 K/UL (ref 1.8–8)
NEUTS SEG NFR BLD: 60 % (ref 32–75)
NRBC # BLD: 0 K/UL (ref 0–0.01)
NRBC BLD-RTO: 0 PER 100 WBC
PHOSPHATE SERPL-MCNC: 5.7 MG/DL (ref 2.6–4.7)
PLATELET # BLD AUTO: 270 K/UL (ref 150–400)
PMV BLD AUTO: 10.7 FL (ref 8.9–12.9)
POTASSIUM SERPL-SCNC: 4 MMOL/L (ref 3.5–5.1)
RBC # BLD AUTO: 3.6 M/UL (ref 4.1–5.7)
SERVICE CMNT-IMP: ABNORMAL
SERVICE CMNT-IMP: ABNORMAL
SERVICE CMNT-IMP: NORMAL
SODIUM SERPL-SCNC: 138 MMOL/L (ref 136–145)
WBC # BLD AUTO: 6.3 K/UL (ref 4.1–11.1)

## 2022-08-15 PROCEDURE — 65270000029 HC RM PRIVATE

## 2022-08-15 PROCEDURE — 80069 RENAL FUNCTION PANEL: CPT

## 2022-08-15 PROCEDURE — 74011250636 HC RX REV CODE- 250/636: Performed by: INTERNAL MEDICINE

## 2022-08-15 PROCEDURE — 36415 COLL VENOUS BLD VENIPUNCTURE: CPT

## 2022-08-15 PROCEDURE — 74011250637 HC RX REV CODE- 250/637: Performed by: NURSE PRACTITIONER

## 2022-08-15 PROCEDURE — 74011250637 HC RX REV CODE- 250/637: Performed by: STUDENT IN AN ORGANIZED HEALTH CARE EDUCATION/TRAINING PROGRAM

## 2022-08-15 PROCEDURE — 74011636637 HC RX REV CODE- 636/637: Performed by: INTERNAL MEDICINE

## 2022-08-15 PROCEDURE — 99232 SBSQ HOSP IP/OBS MODERATE 35: CPT | Performed by: CLINICAL NURSE SPECIALIST

## 2022-08-15 PROCEDURE — 85025 COMPLETE CBC W/AUTO DIFF WBC: CPT

## 2022-08-15 PROCEDURE — 74011636637 HC RX REV CODE- 636/637: Performed by: NURSE PRACTITIONER

## 2022-08-15 PROCEDURE — 74011000250 HC RX REV CODE- 250: Performed by: NURSE PRACTITIONER

## 2022-08-15 PROCEDURE — 76870 US EXAM SCROTUM: CPT

## 2022-08-15 PROCEDURE — 74011250637 HC RX REV CODE- 250/637: Performed by: INTERNAL MEDICINE

## 2022-08-15 PROCEDURE — 82962 GLUCOSE BLOOD TEST: CPT

## 2022-08-15 PROCEDURE — 74011250637 HC RX REV CODE- 250/637: Performed by: HOSPITALIST

## 2022-08-15 PROCEDURE — 83735 ASSAY OF MAGNESIUM: CPT

## 2022-08-15 PROCEDURE — 77010033678 HC OXYGEN DAILY

## 2022-08-15 RX ORDER — OXYCODONE HYDROCHLORIDE 5 MG/1
5 TABLET ORAL
Status: DISCONTINUED | OUTPATIENT
Start: 2022-08-15 | End: 2022-08-20 | Stop reason: HOSPADM

## 2022-08-15 RX ORDER — INSULIN LISPRO 100 [IU]/ML
7 INJECTION, SOLUTION INTRAVENOUS; SUBCUTANEOUS
Status: DISCONTINUED | OUTPATIENT
Start: 2022-08-15 | End: 2022-08-18

## 2022-08-15 RX ORDER — INSULIN GLARGINE 100 [IU]/ML
26 INJECTION, SOLUTION SUBCUTANEOUS DAILY
Status: DISCONTINUED | OUTPATIENT
Start: 2022-08-16 | End: 2022-08-15

## 2022-08-15 RX ORDER — IPRATROPIUM BROMIDE AND ALBUTEROL SULFATE 2.5; .5 MG/3ML; MG/3ML
3 SOLUTION RESPIRATORY (INHALATION)
Status: DISCONTINUED | OUTPATIENT
Start: 2022-08-15 | End: 2022-08-20 | Stop reason: HOSPADM

## 2022-08-15 RX ORDER — INSULIN LISPRO 100 [IU]/ML
4 INJECTION, SOLUTION INTRAVENOUS; SUBCUTANEOUS
Status: DISCONTINUED | OUTPATIENT
Start: 2022-08-15 | End: 2022-08-15

## 2022-08-15 RX ORDER — INSULIN GLARGINE 100 [IU]/ML
32 INJECTION, SOLUTION SUBCUTANEOUS DAILY
Status: DISCONTINUED | OUTPATIENT
Start: 2022-08-16 | End: 2022-08-16

## 2022-08-15 RX ORDER — TRAMADOL HYDROCHLORIDE 50 MG/1
50 TABLET ORAL
Status: DISCONTINUED | OUTPATIENT
Start: 2022-08-15 | End: 2022-08-15

## 2022-08-15 RX ORDER — LEVOFLOXACIN 500 MG/1
500 TABLET, FILM COATED ORAL
Status: DISCONTINUED | OUTPATIENT
Start: 2022-08-15 | End: 2022-08-16

## 2022-08-15 RX ADMIN — Medication 4 UNITS: at 12:07

## 2022-08-15 RX ADMIN — HEPARIN SODIUM 5000 UNITS: 5000 INJECTION INTRAVENOUS; SUBCUTANEOUS at 01:27

## 2022-08-15 RX ADMIN — LEVOFLOXACIN 500 MG: 500 TABLET, FILM COATED ORAL at 22:24

## 2022-08-15 RX ADMIN — CHLORHEXIDINE GLUCONATE 15 ML: 1.2 RINSE ORAL at 21:00

## 2022-08-15 RX ADMIN — SODIUM BICARBONATE 650 MG: 650 TABLET ORAL at 08:29

## 2022-08-15 RX ADMIN — SENNOSIDES AND DOCUSATE SODIUM 1 TABLET: 50; 8.6 TABLET ORAL at 08:29

## 2022-08-15 RX ADMIN — ROSUVASTATIN CALCIUM 10 MG: 10 TABLET, FILM COATED ORAL at 22:24

## 2022-08-15 RX ADMIN — Medication 1 AMPULE: at 22:15

## 2022-08-15 RX ADMIN — SODIUM CHLORIDE, PRESERVATIVE FREE 10 ML: 5 INJECTION INTRAVENOUS at 22:28

## 2022-08-15 RX ADMIN — HEPARIN SODIUM 5000 UNITS: 5000 INJECTION INTRAVENOUS; SUBCUTANEOUS at 12:08

## 2022-08-15 RX ADMIN — OXYCODONE 5 MG: 5 TABLET ORAL at 22:24

## 2022-08-15 RX ADMIN — Medication 3 UNITS: at 08:29

## 2022-08-15 RX ADMIN — Medication 2 UNITS: at 12:08

## 2022-08-15 RX ADMIN — AMLODIPINE BESYLATE 10 MG: 5 TABLET ORAL at 08:29

## 2022-08-15 RX ADMIN — SODIUM CHLORIDE, PRESERVATIVE FREE 10 ML: 5 INJECTION INTRAVENOUS at 15:27

## 2022-08-15 RX ADMIN — Medication 1 AMPULE: at 08:30

## 2022-08-15 RX ADMIN — HYDRALAZINE HYDROCHLORIDE 20 MG: 20 INJECTION INTRAMUSCULAR; INTRAVENOUS at 00:03

## 2022-08-15 RX ADMIN — CARVEDILOL 12.5 MG: 12.5 TABLET, FILM COATED ORAL at 18:19

## 2022-08-15 RX ADMIN — HYDRALAZINE HYDROCHLORIDE 20 MG: 20 INJECTION INTRAMUSCULAR; INTRAVENOUS at 16:44

## 2022-08-15 RX ADMIN — PANTOPRAZOLE SODIUM 40 MG: 40 TABLET, DELAYED RELEASE ORAL at 08:29

## 2022-08-15 RX ADMIN — HEPARIN SODIUM 5000 UNITS: 5000 INJECTION INTRAVENOUS; SUBCUTANEOUS at 18:19

## 2022-08-15 RX ADMIN — SODIUM BICARBONATE 650 MG: 650 TABLET ORAL at 15:26

## 2022-08-15 RX ADMIN — OXYCODONE 5 MG: 5 TABLET ORAL at 15:26

## 2022-08-15 RX ADMIN — CARVEDILOL 12.5 MG: 12.5 TABLET, FILM COATED ORAL at 08:29

## 2022-08-15 RX ADMIN — CASTOR OIL AND BALSAM, PERU: 788; 87 OINTMENT TOPICAL at 22:28

## 2022-08-15 RX ADMIN — INSULIN GLARGINE 16 UNITS: 100 INJECTION, SOLUTION SUBCUTANEOUS at 08:29

## 2022-08-15 RX ADMIN — SODIUM BICARBONATE 650 MG: 650 TABLET ORAL at 22:24

## 2022-08-15 RX ADMIN — ASPIRIN 81 MG: 81 TABLET, COATED ORAL at 08:29

## 2022-08-15 RX ADMIN — SODIUM CHLORIDE, PRESERVATIVE FREE 10 ML: 5 INJECTION INTRAVENOUS at 05:37

## 2022-08-15 NOTE — PROGRESS NOTES
Hospitalist Progress Note    NAME: Herbert Castañeda   :  1980   MRN:  [id number]       Assessment / Plan:  47 y/o male history of poorly controlled T1DM, bipolar disorder, and CKD 3 admitted  for acute hypoxic respiratory failure in the setting of severe diabetic ketoacidosis. EMS called for generalized lethargy and increased work of breathing. Had DKA on presentation, was obtunded, got intubated  and extubated . Acute respiratory failure requiring intubation   Community acquired pneumonia POA    Intubated on , likely d/t respiratory failure from acidosis  Extubated on , now on room air  C/w Levofloxacin, 7 day course    DKA  DKA resolved  BS not controlled  Increased lantus and schedule lispro    Elevated troponin:  Hypertensive urgency:  Bradycardia  Believed to be demand ischemia  C/w clonidine, amlodipine, carvedilol  Hydralazine PRN  Cardiology signed off    FELECIA on CKD Stage 4:  Creatinine improving slowly  No urgent need for HD  C/w bicarb  Nephrology following    Scrotal swelling:  Could be d/t fluid overload from anasarca  Also has some pain  Will get renal sono    Slurred speech on   ? laceration on posterior scalp:  Doesn't remember having any trauma  CT head was done yesterday for ?slurred speech, negative  Seems at baseline now    Estimated discharge date:   Barriers: Scrotal swelling/ pain/ anasarca/ PT/OT consult    Code status: Full  Prophylaxis: Lovenox  Recommended Disposition: Home w/Family     Subjective:     Chief Complaint / Reason for Physician Visit  Follow up for DKA  He mentions having generalized and scrotal pain    Review of Systems:  Symptom Y/N Comments  Symptom Y/N Comments   Fever/Chills n   Chest Pain n    Poor Appetite n   Edema n    Cough    Abdominal Pain     Sputum    Joint Pain     SOB/JOHNSTON    Pruritis/Rash     Nausea/vomit    Tolerating PT/OT     Diarrhea    Tolerating Diet     Constipation    Other x Swelling, pain     Could NOT obtain due to:      Objective:     VITALS:   Last 24hrs VS reviewed since prior progress note. Most recent are:  Patient Vitals for the past 24 hrs:   Temp Pulse Resp BP SpO2   08/15/22 1200 97.7 °F (36.5 °C) 83 23 (!) 188/103 90 %   08/15/22 0800 98.1 °F (36.7 °C) 84 20 (!) 175/98 92 %   08/15/22 0400 -- 83 15 (!) 163/83 97 %   08/15/22 0343 98.8 °F (37.1 °C) -- -- -- --   08/15/22 0119 -- 82 17 (!) 154/81 90 %   08/15/22 0000 98.6 °F (37 °C) 84 19 (!) 181/92 (!) 87 %   08/14/22 2000 98.7 °F (37.1 °C) 80 17 (!) 152/94 (!) 88 %   08/14/22 1830 -- 78 25 (!) 176/80 (!) 88 %   08/14/22 1800 -- 80 12 (!) 103/91 (!) 83 %   08/14/22 1700 -- 78 23 (!) 164/87 90 %   08/14/22 1650 -- 78 24 (!) 161/86 92 %   08/14/22 1400 -- 79 21 (!) 161/90 95 %       Intake/Output Summary (Last 24 hours) at 8/15/2022 1359  Last data filed at 8/15/2022 0756  Gross per 24 hour   Intake 1440 ml   Output 1875 ml   Net -435 ml        I had a face to face encounter and independently examined this patient on 8/15/2022, as outlined below:  PHYSICAL EXAM:  General: Awake, No acute distress  EENT:  EOMI. Anicteric sclerae. MMM  Resp:  CTA bilaterally, no wheezing or rales. No accessory muscle use  CV:  Regular  rhythm,  No edema  GI:  Soft, Non distended, Non tender.   +Bowel sounds  : Scrotal edema and redness  Neurologic:  Alert and oriented X 3, normal speech,   Psych:   Not anxious nor agitated  Ext: B/l Le edema    Reviewed most current lab test results and cultures  YES  Reviewed most current radiology test results   YES  Review and summation of old records today    NO  Reviewed patient's current orders and MAR    YES  PMH/SH reviewed - no change compared to H&P  ________________________________________________________________________  Care Plan discussed with:    Comments   Patient y    Family      RN y    Care Manager     Consultant                       y Multidiciplinary team rounds were held today with , nursing, pharmacist and clinical coordinator. Patient's plan of care was discussed; medications were reviewed and discharge planning was addressed. ________________________________________________________________________  Total NON critical care TIME:  36   Minutes    Total CRITICAL CARE TIME Spent:   Minutes non procedure based      Comments   >50% of visit spent in counseling and coordination of care     ________________________________________________________________________  Jane Huggins MD     Procedures: see electronic medical records for all procedures/Xrays and details which were not copied into this note but were reviewed prior to creation of Plan. LABS:  I reviewed today's most current labs and imaging studies.   Pertinent labs include:  Recent Labs     08/15/22  0316 08/14/22  0359 08/13/22  0338   WBC 6.3 9.2 13.1*   HGB 10.4* 8.0* 9.2*   HCT 31.5* 24.5* 26.6*    325 390     Recent Labs     08/15/22  0316 08/14/22  0359 08/13/22  1602 08/13/22  0338    140 146* 143   K 4.0 3.8 3.8 2.9*    109* 113* 109*   CO2 19* 19* 21 18*   * 252* 161* 187*   BUN 61* 66* 73* 75*   CREA 4.86* 5.05* 5.37* 5.43*   CA 8.4* 8.0* 7.7* 8.0*   MG 2.3  --   --  2.3   PHOS 5.7* 5.7*  --  4.5   ALB 2.1* 2.1*  --   --    TBILI  --  0.4  --   --    ALT  --  25  --   --        Signed: Jane Huggins MD

## 2022-08-15 NOTE — DIABETES MGMT
[facility]    CLINICAL NURSE SPECIALIST CONSULT     Initial Presentation   Sanju Sal is a 46 y.o. male admitted 8/11/2022 after experiencing nausea, vomiting and AMS. Past hx of DKA. EMS reports patient was satting in the 76s, with hypertension on their arrival.  Patient was seen in emergency department several days ago with complaints of bilateral lower extremity edema and referred to nephrology for further management of his acute on chronic renal insufficiency. Low temp, low HR & low BP. O2 sats 98%  ER exam:   Cardiovascular:     Rate and Rhythm: Regular rhythm. Bradycardia present. Heart sounds: Normal heart sounds. No murmur heard. No friction rub. Pulmonary:     Effort: Bradypnea and prolonged expiration present. No respiratory distress. Breath sounds: No stridor. No wheezing or rales. Abdominal:     General: Bowel sounds are normal. There is no distension. Palpations: Abdomen is soft. Tenderness: There is no abdominal tenderness. There is no rebound. Musculoskeletal:         General: No tenderness. Normal range of motion. Cervical back: Normal range of motion and neck supple. Right lower leg: Edema present. Left lower leg: Edema present. Comments: Moving all extremities independently   LAB: WBC 12.6. Glucose 812. Creatine 5.93. GFR 11. Anion Gap 25. A1c 8.7  CXR:   Interval development of bilateral hazy lung opacities concerning for edema or   infection.      HX:   Past Medical History:   Diagnosis Date    Bipolar 1 disorder, depressed ([facility]     Bipolar disorder ([facility]     Chronic kidney disease, stage 3a ([facility]     Depression     Diabetes ([facility]     DKA, type 1 ([facility] 1/27/2013    diagnosed age 23    DKA, type 1 ([address]     H/O noncompliance with medical treatment, presenting hazards to health     MRSA (methicillin resistant staph aureus) culture positive     MRSA (methicillin resistant Staphylococcus aureus)     Face    Noncompliance with medication regimen     Second hand smoke exposure     Seizure ([facility]     Seizures ([facility] 2006 or 2007    one episode during skilled nursing      INITIAL DX:   DKA (diabetic ketoacidosis) ([facility] [E11.10]     Current Treatment     TX: ASA. Levaquin. Protonix. Insulin. BP management    Consulted by Provider for advanced diabetes nursing assessment and care for:   [x] Transitioning off Kathy London   [x] Inpatient management strategy  [] Home management assessment  [] Survival skill education    Hospital Course   Clinical progress has been complicated by DKA. 08/15 Patient off Glucostabilizer, vasopressors and extubated over the weekend; has been on 16 units Lantus daily in the last 3 days. Diabetes History   The patient reports a 20 year history of Type 1 diabetes. He has a positive family hx of diabetes (mom & niece). He states today that he sees Dr. Sushil Lehman (endocrinologist) for his diabetes. He was recently started on a Tandem insulin pump on 07/15/22. Unsure of which CGM is in use. Diabetes-related Medical History  Acute complications  DKA  Neurological complications  Peripheral neuropathy  Microvascular disease  Retinopathy    Diabetes Medication History  Key Antihyperglycemic Medications               insulin aspart U-100 (NovoLOG U-100 Insulin aspart) 100 unit/mL injection Use as instructed via insulin pump. Dx code E10.65 max daily dose 50U    insulin glargine (LANTUS) 100 unit/mL injection 16 Units by SubCUTAneous route daily. insulin aspart U-100 (NovoLOG Flexpen U-100 Insulin) 100 unit/mL (3 mL) inpn Take 1 unit for every 15 grams of carbohydrates, 1 unit for every 50 points >150. Max daily dose 25U. Overall evaluation:    [x] Achieving A1c target with drug therapy & self-care practices    Subjective   \" I have the Tandem pump and use a Dexcom. \"     Objective   Physical exam  General Normal weight male. Conversant and cooperative, Sitting up on side of bed.   Neuro  Alert, oriented   Vital Signs Visit Vitals  BP (!) 163/83   Pulse 83   Temp 98.1 °F (36.7 °C)   Resp 15   Ht 5' 9\" (1.753 m)   Wt 91 kg (200 lb 9.9 oz)   SpO2 97%   BMI 29.63 kg/m²     Laboratory  Recent Labs     08/15/22  0316 08/14/22  0359 08/13/22  1602 08/13/22  0338   * 252* 161* 187*   AGAP 13 12 12 16*   WBC 6.3 9.2  --  13.1*   CREA 4.86* 5.05* 5.37* 5.43*   GFRNA 13* 13* 12* 12*   AST  --  20  --   --    ALT  --  25  --   --      Factors impacting BG management  Factor Dose Comments   Nutrition:  Standard meals   45 gram/meal   Ate dinner & morning meals   Pain Tylenol prn    Infection   Levofloxacin 500 mg Q 48 hours     Other:   Kidney function  Liver enzymes   CKD   Normal      Blood glucose pattern      Significant diabetes-related events over the past 24-72 hours    Assessment and Plan   Nursing Diagnosis Risk for unstable blood glucose pattern   Nursing Intervention Domain 5253 Decision-making Support   Nursing Interventions Examined current inpatient diabetes/blood glucose control   Explored factors facilitating and impeding inpatient management  Explored corrective strategies with patient and responsible inpatient provider   Informed patient of rational for insulin strategy while hospitalized     Evaluation   This 46year old patient with Type 1 diabetes did not achieve BG control prior to admission as evidenced by an A1c of 8.7%. Patient's A1c was much higher just one month ago prior to start of insulin pump therapy. Of concern, the patient had an admission glucose of 812. The patient was in DKA and was started on Glucostabilzer. He is now alert and off ventilator. In our discussion, he admits he did have some N/V prior to him \"falling out\". He has been on Lantus 16 units daily for the last 3 days, but his basal rate is the equivalent of 26 units per day. Khang Reilly He has received 22 units of correctional insulin in the last 24 hours.      PTA, he was on a Tandem pump which was started 07/15/22 with a basal rate equated to 26 units daily. He is now eating and will likely require mealtime insulin coverage along with his basal dose to get him closer to his home dose. Since the last hospitalization, he states he has been using a CGM for blood glucose monitoring. He states it is a Dexcom system, but unsure if this is accurate. He states he has an upcoming appointment with Dr. Amada Clancy. Recommendations     Lantus 26 units daily    Add mealtime insulin 4 units Lispro with each consumed meal.     3. Referral to case management regarding Medicaid questions that patient has inquired about. Billing Code(s)   [] [id number] IP subsequent hospital care - 35 minutes [] 56191 Prolonged Services - 65 minutes [] 46189 Prolonged Services - 110 minutes  [x] 40991 IP subsequent hospital care - 25 minutes [] 47228 Prolonged Services - 55 minutes [] [id number] Prolonged Services - 100 minutes  [] [id number] IP subsequent hospital care - 15 minutes [] 17038 Prolonged Services - 45 minutes [] 22208 Prolonged Services - 90 minutes    Before making these care recommendations, I personally reviewed the hospitalization record, including notes, laboratory & diagnostic data and current medications, and examined the patient at the bedside (circumstances permitting) before making care recommendations. More than fifty (50) percent of the time was spent in patient counseling and/or care coordination.   Total minutes: 25 minutes    ANEESH Meier  Diabetes Clinical Nurse Specialist  [facility]  Access via [place]

## 2022-08-15 NOTE — PROGRESS NOTES
0715- Bedside and Verbal shift change report given to 0 [facility] (oncoming nurse) by Rafael Hansen RN (offgoing nurse). Report included the following information SBAR, Kardex, Intake/Output, Recent Results, Cardiac Rhythm NSR, and Alarm Parameters . 0800- Shift assessment completed; see flow sheet for details. Pt is A/V/Ox4; cooperative; following commands. Currently in NSR; BP stable. Lungs are clear; diminished in the bases; remains on NC 4LPM. ABD is semi-soft; BS active; reports a good appetite. +2 pitting edema to BUE, BLE and Abdomen. Pt reports discomfort to BLE and groin; will attempt to get pt a stronger PRN pain medication    1110- Dr. Emory Kim made aware of pt's increase pain to BLE; gave telephone order for PRN Roxicodone Q6hr PRN     Attempted to page Nephrology regarding ? Restart of PO Lasix     1200- Reassessment completed; see flow sheet for details. No acute changes in pt assessment. Pt resting in bed with family at the bedside     Hospitalist at the bedside; see progress note for details. Scrotal US ordered    1526- PRN Rosemary given for BLE discomfort     1600- Reassessment completed; see flow sheet for details. No acute changes in pt assessment. Hernan Kim aware of pt's complaint of nasal congestion; order placed for Saline nasal spray     1900- TRANSFER - OUT REPORT:    Verbal report given to Obed RN(name) on Petey Kee  being transferred to Satnam's RN(unit) for routine progression of care       Report consisted of patients Situation, Background, Assessment and   Recommendations(SBAR). Information from the following report(s) SBAR, Kardex, Intake/Output, Recent Results, and Cardiac Rhythm NSR  was reviewed with the receiving nurse.     Lines:   Peripheral IV 08/11/22 Left Arm (Active)   Site Assessment Clean, dry, & intact 08/15/22 1600   Phlebitis Assessment 0 08/15/22 1600   Infiltration Assessment 0 08/15/22 1600   Dressing Status Clean, dry, & intact 08/15/22 1600 Dressing Type Transparent 08/15/22 1600   Hub Color/Line Status Pink;Flushed 08/15/22 1600   Alcohol Cap Used Yes 08/15/22 0000       Peripheral IV 08/11/22 Right;Upper Arm (Active)   Site Assessment Clean, dry, & intact 08/15/22 1600   Phlebitis Assessment 0 08/15/22 1600   Infiltration Assessment 0 08/15/22 1600   Dressing Status Clean, dry, & intact 08/15/22 1600   Dressing Type Transparent 08/15/22 1600   Hub Color/Line Status Pink;Flushed 08/15/22 1600   Alcohol Cap Used Yes 08/15/22 0000       Peripheral IV 08/12/22 Left;Posterior Wrist (Active)   Site Assessment Clean, dry, & intact 08/15/22 1600   Phlebitis Assessment 0 08/15/22 1600   Infiltration Assessment 0 08/15/22 1600   Dressing Status Clean, dry, & intact 08/15/22 1600   Dressing Type Transparent 08/15/22 1600   Hub Color/Line Status Pink;Flushed 08/15/22 1600   Alcohol Cap Used Yes 08/14/22 0400        Opportunity for questions and clarification was provided.       Patient transported with:   Monitor  Registered Nurse    Will transport patient following shift change

## 2022-08-15 NOTE — PROGRESS NOTES
Nephrology Progress Note  [facility] / [address], [address]  [address] [facility], [address]  Phone - [phone]  Fax - [phone]                 Patient: Kaley Grijalva                   YOB: 1980        Date- 8/15/2022                      Admit Date: 8/11/2022  CC: Follow up for FELECIA on CKD          IMPRESSION & PLAN:    FELECIA stage III on CKD stage IV(secondary to intravenous volume depletion)  CKD stage IV(baseline creatinine 2.8-3.5)(secondary to uncontrolled diabetes)  Anion gap metabolic acidosis sec to DKA  Non-anion gap metabolic acidosis  Diabetic ketoacidosis  Type 1 diabetes uncontrolled  Hyperphosphatemia  Elevated troponins  Community-acquired pneumonia  Acute hypoxic respiratory failure on mechanical ventilation    PLAN-  Creatinine remains elevated but continues to show gradual improvement. Remains nonoliguric with 2 L urine output. Continue sodium bicarbonate tabs  No acute need for RRT from volume laboratory standpoint  He will need nephrology follow-up as an outpatient     Subjective: Interval History:   -Patient was seen and examined today.  -Possible transfer out of ICU. Objective:   Vitals:    08/15/22 0119 08/15/22 0343 08/15/22 0400 08/15/22 0800   BP: (!) 154/81  (!) 163/83    Pulse: 82  83    Resp: 17  15    Temp:  98.8 °F (37.1 °C)  98.1 °F (36.7 °C)   SpO2: 90%  97%    Weight:  91 kg (200 lb 9.9 oz)     Height:          08/14 0701 - 08/15 0700  In: 1940 [P.O.:1440; I.V.:500]  Out: 2000 [Urine:2000]  Last 3 Recorded Weights in this Encounter    08/13/22 0820 08/14/22 0500 08/15/22 0343   Weight: 89.4 kg (197 lb) 89.1 kg (196 lb 6.9 oz) 91 kg (200 lb 9.9 oz)      Physical exam:    GEN: NAD  NECK- Supple, no mass  RESP: No wheezing, Clear b/l  CVS: S1,S2  RRR  NEURO: Normal speech, Non focal  EXT: No Edema   PSYCH: Normal Mood    Chart reviewed. Pertinent Notes reviewed.      Data Review :  Recent Labs     08/15/22  0316 08/14/22  0359 08/13/22  1602 08/13/22  0338    140 146* 143   K 4.0 3.8 3.8 2.9*    109* 113* 109*   CO2 19* 19* 21 18*   BUN 61* 66* 73* 75*   CREA 4.86* 5.05* 5.37* 5.43*   * 252* 161* 187*   CA 8.4* 8.0* 7.7* 8.0*   MG 2.3  --   --  2.3   PHOS 5.7* 5.7*  --  4.5     Recent Labs     08/15/22  0316 08/14/22  0359 08/13/22 0338   WBC 6.3 9.2 13.1*   HGB 10.4* 8.0* 9.2*   HCT 31.5* 24.5* 26.6*    325 390     No results for input(s): FE, TIBC, PSAT, FERR in the last 72 hours.    Medication list  reviewed  Current Facility-Administered Medications   Medication Dose Route Frequency    [START ON 8/16/2022] insulin glargine (LANTUS) injection 26 Units  26 Units SubCUTAneous DAILY    insulin lispro (HUMALOG) injection 4 Units  4 Units SubCUTAneous TIDAC    pantoprazole (PROTONIX) tablet 40 mg  40 mg Oral ACB    cloNIDine (CATAPRES) 0.1 mg/24 hr patch 1 Patch  1 Patch TransDERmal Q7D    heparin (porcine) injection 5,000 Units  5,000 Units SubCUTAneous Q8H    rosuvastatin (CRESTOR) tablet 10 mg  10 mg Oral QHS    aspirin delayed-release tablet 81 mg  81 mg Oral DAILY    labetaloL (NORMODYNE;TRANDATE) injection 20 mg  20 mg IntraVENous Q4H PRN    hydrALAZINE (APRESOLINE) 20 mg/mL injection 20 mg  20 mg IntraVENous Q6H PRN    sodium bicarbonate tablet 650 mg  650 mg Oral TID    dextrose 10% infusion 0-250 mL  0-250 mL IntraVENous PRN    glucose chewable tablet 16 g  4 Tablet Oral PRN    glucagon (GLUCAGEN) injection 1 mg  1 mg IntraMUSCular PRN    dextrose 10% infusion 0-250 mL  0-250 mL IntraVENous PRN    carvediloL (COREG) tablet 12.5 mg  12.5 mg Oral BID    amLODIPine (NORVASC) tablet 10 mg  10 mg Oral DAILY    insulin lispro (HUMALOG) injection   SubCUTAneous AC&HS    senna-docusate (PERICOLACE) 8.6-50 mg per tablet 1 Tablet  1 Tablet Oral DAILY    sodium chloride (NS) flush 5-40 mL  5-40 mL IntraVENous Q8H    sodium chloride (NS) flush 5-40 mL  5-40 mL IntraVENous PRN    acetaminophen (TYLENOL) tablet 650 mg  650 mg Oral Q6H PRN    Or    acetaminophen (TYLENOL) suppository 650 mg  650 mg Rectal Q6H PRN    polyethylene glycol (MIRALAX) packet 17 g  17 g Oral DAILY PRN    ondansetron (ZOFRAN) injection 4 mg  4 mg IntraVENous Q6H PRN    chlorhexidine (ORAL CARE KIT) 0.12 % mouthwash 15 mL  15 mL Oral Q12H    levoFLOXacin (LEVAQUIN) 500 mg in D5W IVPB  500 mg IntraVENous Q48H    0.9% sodium chloride infusion 250 mL  250 mL IntraVENous PRN    balsam peru-castor oiL (VENELEX) ointment   Topical Q12H    alcohol 62% (NOZIN) nasal  1 Ampule  1 Ampule Topical Q12H        Burt Severs, MD  8/15/2022

## 2022-08-15 NOTE — PROGRESS NOTES
2100  Pt with complaints of hip pain. Repositioned and pillows placed for comfort. Also with complaints of scrotal pain. Attempt to elevate scrotal sac. Unable to receive sedatives at this time. Will give Tylenol when next doseage available. Pt with sats 85%. Found to have O2 off. Encouraged IS (can reach [phone]) and deep breathing exercises. Sats improved to 90%    0000  . Hydralazine 20 mg IVP  0119  154/81    06:15  Awakened and called to room. Pt with small amount of bleeding from rt nostril. HOB raised and pt pinched nasal bridge for 5 minutes and bleeding appears to have stopped.

## 2022-08-15 NOTE — PROGRESS NOTES
Participated in CCU IDR where pt was discussed.   Yas Resendez M.Div, [facility] Paging Service [phone]

## 2022-08-16 ENCOUNTER — APPOINTMENT (OUTPATIENT)
Dept: GENERAL RADIOLOGY | Age: 42
DRG: 420 | End: 2022-08-16
Attending: STUDENT IN AN ORGANIZED HEALTH CARE EDUCATION/TRAINING PROGRAM
Payer: MEDICAID

## 2022-08-16 LAB
ALBUMIN SERPL-MCNC: 2.1 G/DL (ref 3.5–5)
ANION GAP SERPL CALC-SCNC: 11 MMOL/L (ref 5–15)
ARTERIAL PATENCY WRIST A: POSITIVE
BASE DEFICIT BLD-SCNC: 5.4 MMOL/L
BASOPHILS # BLD: 0.1 K/UL (ref 0–0.1)
BASOPHILS NFR BLD: 1 % (ref 0–1)
BDY SITE: ABNORMAL
BNP SERPL-MCNC: ABNORMAL PG/ML
BUN SERPL-MCNC: 58 MG/DL (ref 6–20)
BUN/CREAT SERPL: 12 (ref 12–20)
CALCIUM SERPL-MCNC: 8.3 MG/DL (ref 8.5–10.1)
CHLORIDE SERPL-SCNC: 106 MMOL/L (ref 97–108)
CO2 SERPL-SCNC: 19 MMOL/L (ref 21–32)
CREAT SERPL-MCNC: 4.66 MG/DL (ref 0.7–1.3)
DIFFERENTIAL METHOD BLD: ABNORMAL
EOSINOPHIL # BLD: 0.5 K/UL (ref 0–0.4)
EOSINOPHIL NFR BLD: 6 % (ref 0–7)
ERYTHROCYTE [DISTWIDTH] IN BLOOD BY AUTOMATED COUNT: 16.3 % (ref 11.5–14.5)
GAS FLOW.O2 O2 DELIVERY SYS: ABNORMAL L/MIN
GLUCOSE BLD STRIP.AUTO-MCNC: 197 MG/DL (ref 65–117)
GLUCOSE BLD STRIP.AUTO-MCNC: 205 MG/DL (ref 65–117)
GLUCOSE BLD STRIP.AUTO-MCNC: 67 MG/DL (ref 65–117)
GLUCOSE BLD STRIP.AUTO-MCNC: 80 MG/DL (ref 65–117)
GLUCOSE SERPL-MCNC: 120 MG/DL (ref 65–100)
HCO3 BLD-SCNC: 19.7 MMOL/L (ref 22–26)
HCT VFR BLD AUTO: 26.9 % (ref 36.6–50.3)
HGB BLD-MCNC: 8.9 G/DL (ref 12.1–17)
IMM GRANULOCYTES # BLD AUTO: 0.1 K/UL (ref 0–0.04)
IMM GRANULOCYTES NFR BLD AUTO: 1 % (ref 0–0.5)
LYMPHOCYTES # BLD: 1.4 K/UL (ref 0.8–3.5)
LYMPHOCYTES NFR BLD: 17 % (ref 12–49)
MCH RBC QN AUTO: 29 PG (ref 26–34)
MCHC RBC AUTO-ENTMCNC: 33.1 G/DL (ref 30–36.5)
MCV RBC AUTO: 87.6 FL (ref 80–99)
MONOCYTES # BLD: 0.8 K/UL (ref 0–1)
MONOCYTES NFR BLD: 9 % (ref 5–13)
NEUTS SEG # BLD: 5.5 K/UL (ref 1.8–8)
NEUTS SEG NFR BLD: 66 % (ref 32–75)
NRBC # BLD: 0.02 K/UL (ref 0–0.01)
NRBC BLD-RTO: 0.2 PER 100 WBC
O2/TOTAL GAS SETTING VFR VENT: 10 %
PCO2 BLD: 35.8 MMHG (ref 35–45)
PH BLD: 7.35 [PH] (ref 7.35–7.45)
PHOSPHATE SERPL-MCNC: 5.7 MG/DL (ref 2.6–4.7)
PLATELET # BLD AUTO: 298 K/UL (ref 150–400)
PMV BLD AUTO: 10.7 FL (ref 8.9–12.9)
PO2 BLD: 69 MMHG (ref 80–100)
POTASSIUM SERPL-SCNC: 4.3 MMOL/L (ref 3.5–5.1)
RBC # BLD AUTO: 3.07 M/UL (ref 4.1–5.7)
SAO2 % BLD: 92.7 % (ref 92–97)
SERVICE CMNT-IMP: ABNORMAL
SERVICE CMNT-IMP: ABNORMAL
SERVICE CMNT-IMP: NORMAL
SERVICE CMNT-IMP: NORMAL
SODIUM SERPL-SCNC: 136 MMOL/L (ref 136–145)
SPECIMEN TYPE: ABNORMAL
WBC # BLD AUTO: 8.3 K/UL (ref 4.1–11.1)

## 2022-08-16 PROCEDURE — 36600 WITHDRAWAL OF ARTERIAL BLOOD: CPT

## 2022-08-16 PROCEDURE — 74011250637 HC RX REV CODE- 250/637: Performed by: HOSPITALIST

## 2022-08-16 PROCEDURE — 74011250637 HC RX REV CODE- 250/637: Performed by: NURSE PRACTITIONER

## 2022-08-16 PROCEDURE — 74011250637 HC RX REV CODE- 250/637: Performed by: INTERNAL MEDICINE

## 2022-08-16 PROCEDURE — 74011250636 HC RX REV CODE- 250/636: Performed by: NURSE PRACTITIONER

## 2022-08-16 PROCEDURE — 74011250636 HC RX REV CODE- 250/636: Performed by: INTERNAL MEDICINE

## 2022-08-16 PROCEDURE — 94760 N-INVAS EAR/PLS OXIMETRY 1: CPT

## 2022-08-16 PROCEDURE — 71045 X-RAY EXAM CHEST 1 VIEW: CPT

## 2022-08-16 PROCEDURE — 77010033711 HC HIGH FLOW OXYGEN

## 2022-08-16 PROCEDURE — 99231 SBSQ HOSP IP/OBS SF/LOW 25: CPT

## 2022-08-16 PROCEDURE — 82803 BLOOD GASES ANY COMBINATION: CPT

## 2022-08-16 PROCEDURE — 65270000029 HC RM PRIVATE

## 2022-08-16 PROCEDURE — 74011636637 HC RX REV CODE- 636/637: Performed by: STUDENT IN AN ORGANIZED HEALTH CARE EDUCATION/TRAINING PROGRAM

## 2022-08-16 PROCEDURE — 74011250637 HC RX REV CODE- 250/637: Performed by: STUDENT IN AN ORGANIZED HEALTH CARE EDUCATION/TRAINING PROGRAM

## 2022-08-16 PROCEDURE — 82962 GLUCOSE BLOOD TEST: CPT

## 2022-08-16 PROCEDURE — 74011000250 HC RX REV CODE- 250: Performed by: NURSE PRACTITIONER

## 2022-08-16 PROCEDURE — 36415 COLL VENOUS BLD VENIPUNCTURE: CPT

## 2022-08-16 PROCEDURE — 85025 COMPLETE CBC W/AUTO DIFF WBC: CPT

## 2022-08-16 PROCEDURE — 83880 ASSAY OF NATRIURETIC PEPTIDE: CPT

## 2022-08-16 PROCEDURE — 74011636637 HC RX REV CODE- 636/637: Performed by: INTERNAL MEDICINE

## 2022-08-16 PROCEDURE — 74011000250 HC RX REV CODE- 250: Performed by: STUDENT IN AN ORGANIZED HEALTH CARE EDUCATION/TRAINING PROGRAM

## 2022-08-16 PROCEDURE — 80069 RENAL FUNCTION PANEL: CPT

## 2022-08-16 RX ORDER — FENTANYL CITRATE 50 UG/ML
50 INJECTION, SOLUTION INTRAMUSCULAR; INTRAVENOUS ONCE
Status: COMPLETED | OUTPATIENT
Start: 2022-08-17 | End: 2022-08-16

## 2022-08-16 RX ORDER — BUMETANIDE 0.25 MG/ML
2 INJECTION INTRAMUSCULAR; INTRAVENOUS ONCE
Status: COMPLETED | OUTPATIENT
Start: 2022-08-16 | End: 2022-08-16

## 2022-08-16 RX ORDER — BUMETANIDE 1 MG/1
1 TABLET ORAL 2 TIMES DAILY
Status: DISCONTINUED | OUTPATIENT
Start: 2022-08-16 | End: 2022-08-16

## 2022-08-16 RX ORDER — LIDOCAINE HYDROCHLORIDE 20 MG/ML
JELLY TOPICAL ONCE
Status: COMPLETED | OUTPATIENT
Start: 2022-08-16 | End: 2022-08-16

## 2022-08-16 RX ORDER — HYDROMORPHONE HYDROCHLORIDE 1 MG/ML
0.5 INJECTION, SOLUTION INTRAMUSCULAR; INTRAVENOUS; SUBCUTANEOUS ONCE
Status: COMPLETED | OUTPATIENT
Start: 2022-08-16 | End: 2022-08-16

## 2022-08-16 RX ORDER — LEVOFLOXACIN 750 MG/1
750 TABLET ORAL
Status: COMPLETED | OUTPATIENT
Start: 2022-08-17 | End: 2022-08-17

## 2022-08-16 RX ORDER — BUMETANIDE 0.25 MG/ML
1 INJECTION INTRAMUSCULAR; INTRAVENOUS ONCE
Status: COMPLETED | OUTPATIENT
Start: 2022-08-16 | End: 2022-08-16

## 2022-08-16 RX ORDER — CARVEDILOL 12.5 MG/1
25 TABLET ORAL 2 TIMES DAILY WITH MEALS
Status: DISCONTINUED | OUTPATIENT
Start: 2022-08-16 | End: 2022-08-20 | Stop reason: HOSPADM

## 2022-08-16 RX ORDER — OXYMETAZOLINE HCL 0.05 %
2 SPRAY, NON-AEROSOL (ML) NASAL
Status: DISCONTINUED | OUTPATIENT
Start: 2022-08-16 | End: 2022-08-20 | Stop reason: HOSPADM

## 2022-08-16 RX ORDER — BUMETANIDE 1 MG/1
2 TABLET ORAL 2 TIMES DAILY
Status: DISCONTINUED | OUTPATIENT
Start: 2022-08-16 | End: 2022-08-17

## 2022-08-16 RX ORDER — INSULIN GLARGINE 100 [IU]/ML
20 INJECTION, SOLUTION SUBCUTANEOUS DAILY
Status: DISCONTINUED | OUTPATIENT
Start: 2022-08-17 | End: 2022-08-18

## 2022-08-16 RX ADMIN — CASTOR OIL AND BALSAM, PERU: 788; 87 OINTMENT TOPICAL at 09:23

## 2022-08-16 RX ADMIN — CARVEDILOL 12.5 MG: 12.5 TABLET, FILM COATED ORAL at 09:22

## 2022-08-16 RX ADMIN — Medication 7 UNITS: at 09:23

## 2022-08-16 RX ADMIN — Medication 1 AMPULE: at 21:00

## 2022-08-16 RX ADMIN — Medication 7 UNITS: at 12:57

## 2022-08-16 RX ADMIN — SODIUM CHLORIDE, PRESERVATIVE FREE 10 ML: 5 INJECTION INTRAVENOUS at 21:17

## 2022-08-16 RX ADMIN — OXYCODONE 5 MG: 5 TABLET ORAL at 16:56

## 2022-08-16 RX ADMIN — CASTOR OIL AND BALSAM, PERU: 788; 87 OINTMENT TOPICAL at 21:00

## 2022-08-16 RX ADMIN — BUMETANIDE 1 MG: 1 TABLET ORAL at 09:24

## 2022-08-16 RX ADMIN — OXYCODONE 5 MG: 5 TABLET ORAL at 22:53

## 2022-08-16 RX ADMIN — OXYCODONE 5 MG: 5 TABLET ORAL at 09:27

## 2022-08-16 RX ADMIN — HEPARIN SODIUM 5000 UNITS: 5000 INJECTION INTRAVENOUS; SUBCUTANEOUS at 09:27

## 2022-08-16 RX ADMIN — ROSUVASTATIN CALCIUM 10 MG: 10 TABLET, FILM COATED ORAL at 21:31

## 2022-08-16 RX ADMIN — SODIUM BICARBONATE 650 MG: 650 TABLET ORAL at 21:31

## 2022-08-16 RX ADMIN — Medication 3 UNITS: at 12:57

## 2022-08-16 RX ADMIN — SODIUM BICARBONATE 650 MG: 650 TABLET ORAL at 09:22

## 2022-08-16 RX ADMIN — HYDROMORPHONE HYDROCHLORIDE 0.5 MG: 1 INJECTION, SOLUTION INTRAMUSCULAR; INTRAVENOUS; SUBCUTANEOUS at 04:27

## 2022-08-16 RX ADMIN — SODIUM CHLORIDE, PRESERVATIVE FREE 10 ML: 5 INJECTION INTRAVENOUS at 15:16

## 2022-08-16 RX ADMIN — FENTANYL CITRATE 50 MCG: 0.05 INJECTION, SOLUTION INTRAMUSCULAR; INTRAVENOUS at 23:33

## 2022-08-16 RX ADMIN — Medication 1 AMPULE: at 09:26

## 2022-08-16 RX ADMIN — ASPIRIN 81 MG: 81 TABLET, COATED ORAL at 09:22

## 2022-08-16 RX ADMIN — BUMETANIDE 2 MG: 1 TABLET ORAL at 17:00

## 2022-08-16 RX ADMIN — LIDOCAINE HYDROCHLORIDE: 20 JELLY TOPICAL at 03:39

## 2022-08-16 RX ADMIN — INSULIN GLARGINE 32 UNITS: 100 INJECTION, SOLUTION SUBCUTANEOUS at 09:25

## 2022-08-16 RX ADMIN — AMLODIPINE BESYLATE 10 MG: 5 TABLET ORAL at 09:22

## 2022-08-16 RX ADMIN — Medication 7 UNITS: at 09:22

## 2022-08-16 RX ADMIN — SODIUM BICARBONATE 650 MG: 650 TABLET ORAL at 16:56

## 2022-08-16 RX ADMIN — SENNOSIDES AND DOCUSATE SODIUM 1 TABLET: 50; 8.6 TABLET ORAL at 09:20

## 2022-08-16 RX ADMIN — HEPARIN SODIUM 5000 UNITS: 5000 INJECTION INTRAVENOUS; SUBCUTANEOUS at 16:56

## 2022-08-16 RX ADMIN — BUMETANIDE 1 MG: 0.25 INJECTION INTRAMUSCULAR; INTRAVENOUS at 02:11

## 2022-08-16 RX ADMIN — CARVEDILOL 25 MG: 12.5 TABLET, FILM COATED ORAL at 16:56

## 2022-08-16 RX ADMIN — BUMETANIDE 2 MG: 0.25 INJECTION INTRAMUSCULAR; INTRAVENOUS at 03:53

## 2022-08-16 RX ADMIN — SODIUM CHLORIDE, PRESERVATIVE FREE 10 ML: 5 INJECTION INTRAVENOUS at 06:15

## 2022-08-16 RX ADMIN — CHLORHEXIDINE GLUCONATE 15 ML: 1.2 RINSE ORAL at 21:00

## 2022-08-16 RX ADMIN — PANTOPRAZOLE SODIUM 40 MG: 40 TABLET, DELAYED RELEASE ORAL at 09:21

## 2022-08-16 NOTE — PROGRESS NOTES
Physical Therapy:  Chart reviewed. Discussed with nurse ,and pt with decline in medical status with rapid response calls, increased BP and blood sugars, working on recovery with interventions in place. Will defer and continue to follow as appropriate.

## 2022-08-16 NOTE — DIABETES MGMT
[facility]    CLINICAL NURSE SPECIALIST CONSULT     Initial Presentation   Mitzi Garland is a 46 y.o. male admitted 8/11/2022 after experiencing nausea, vomiting and AMS. Past hx of DKA. EMS reports patient was satting in the 76s, with hypertension on their arrival.  Patient was seen in emergency department several days ago with complaints of bilateral lower extremity edema and referred to nephrology for further management of his acute on chronic renal insufficiency. Low temp, low HR & low BP. O2 sats 98%  ER exam:   Cardiovascular:     Rate and Rhythm: Regular rhythm. Bradycardia present. Heart sounds: Normal heart sounds. No murmur heard. No friction rub. Pulmonary:     Effort: Bradypnea and prolonged expiration present. No respiratory distress. Breath sounds: No stridor. No wheezing or rales. Abdominal:     General: Bowel sounds are normal. There is no distension. Palpations: Abdomen is soft. Tenderness: There is no abdominal tenderness. There is no rebound. Musculoskeletal:         General: No tenderness. Normal range of motion. Cervical back: Normal range of motion and neck supple. Right lower leg: Edema present. Left lower leg: Edema present. Comments: Moving all extremities independently   LAB: WBC 12.6. Glucose 812. Creatine 5.93. GFR 11. Anion Gap 25. A1c 8.7  CXR:   Interval development of bilateral hazy lung opacities concerning for edema or   infection.      HX:   Past Medical History:   Diagnosis Date    Bipolar 1 disorder, depressed ([facility]     Bipolar disorder ([facility]     Chronic kidney disease, stage 3a ([facility]     Depression     Diabetes ([facility]     DKA, type 1 ([facility] 1/27/2013    diagnosed age 23    DKA, type 1 ([facility]     H/O noncompliance with medical treatment, presenting hazards to health     MRSA (methicillin resistant staph aureus) culture positive     MRSA (methicillin resistant Staphylococcus aureus)     Face    Noncompliance with medication regimen     Second hand smoke exposure     Seizure ([facility]     Seizures ([facility] 2006 or 2007    one episode during retirement      INITIAL DX:   DKA (diabetic ketoacidosis) ([facility] [E11.10]     Current Treatment     TX: ASA. Levaquin. Protonix. Insulin. BP management    Consulted by Provider for advanced diabetes nursing assessment and care for:   [x] Transitioning off Charissa Silvius   [x] Inpatient management strategy  [] Home management assessment  [] Survival skill education    Hospital Course   Clinical progress has been complicated by DKA. 08/15 Patient off Glucostabilizer, vasopressors and extubated over the weekend; has been on 16 units Lantus daily in the last 3 days. Diabetes History   The patient reports a 20 year history of Type 1 diabetes. He has a positive family hx of diabetes (mom & niece). He states today that he sees Dr. Matty Monterroso (endocrinologist) for his diabetes. He was recently started on a Tandem insulin pump on 07/15/22. Unsure of which CGM is in use. Diabetes-related Medical History  Acute complications  DKA  Neurological complications  Peripheral neuropathy  Microvascular disease  Retinopathy    Diabetes Medication History  Key Antihyperglycemic Medications               insulin aspart U-100 (NovoLOG U-100 Insulin aspart) 100 unit/mL injection Use as instructed via insulin pump. Dx code E10.65 max daily dose 50U    insulin glargine (LANTUS) 100 unit/mL injection 16 Units by SubCUTAneous route daily. insulin aspart U-100 (NovoLOG Flexpen U-100 Insulin) 100 unit/mL (3 mL) inpn Take 1 unit for every 15 grams of carbohydrates, 1 unit for every 50 points >150. Max daily dose 25U. Overall evaluation:    [x] Achieving A1c target with drug therapy & self-care practices    Subjective   \" I had pnemonia\"     Objective   Physical exam  General Normal weight male. Conversant and cooperative, Sitting up on side of bed.   Neuro  Alert, oriented   Vital Signs Visit Vitals  BP (!) 184/87 Pulse 85   Temp 98 °F (36.7 °C)   Resp 19   Ht 5' 9\" (1.753 m)   Wt 91 kg (200 lb 9.9 oz)   SpO2 98%   BMI 29.63 kg/m²     Laboratory  Recent Labs     08/16/22  0050 08/15/22  0316 08/14/22  0359   * 201* 252*   AGAP 11 13 12   WBC 8.3 6.3 9.2   CREA 4.66* 4.86* 5.05*   GFRNA 14* 13* 13*   AST  --   --  20   ALT  --   --  25     Factors impacting BG management  Factor Dose Comments   Nutrition:  Standard meals   45 gram/meal   Ate dinner & morning meals   Pain Tylenol prn    Infection   Levofloxacin 500 mg Q 48 hours     Other:   Kidney function  Liver enzymes   CKD   Normal      Blood glucose pattern      Significant diabetes-related events over the past 24-72 hours    Assessment and Plan   Nursing Diagnosis Risk for unstable blood glucose pattern   Nursing Intervention Domain 5251 Decision-making Support   Nursing Interventions Examined current inpatient diabetes/blood glucose control   Explored factors facilitating and impeding inpatient management  Explored corrective strategies with patient and responsible inpatient provider   Informed patient of rational for insulin strategy while hospitalized     Evaluation   This 46year old patient with Type 1 diabetes did not achieve BG control prior to admission as evidenced by an A1c of 8.7%. Patient's A1c was much higher just one month ago prior to start of insulin pump therapy. Of concern, the patient had an admission glucose of 812. The patient was in DKA and was started on Glucostabilzer. He is now alert and off ventilator. In our discussion, he admits he did have some N/V prior to him \"falling out\". He has been on Lantus 16 units daily for the last 3 days, but his basal rate is the equivalent of 26 units per day. Tone Be He has received 22 units of correctional insulin in the last 24 hours. PTA, he was on a Tandem pump which was started 07/15/22 with a basal rate equated to 26 units daily. He is now eating and will likely require mealtime insulin coverage along with his basal dose to get him closer to his home dose. Since the last hospitalization, he states he has been using a CGM for blood glucose monitoring. He states it is a Dexcom system, but unsure if this is accurate. He states he has an upcoming appointment with Dr. Comer Goltz. Some info copied from [address] [facility] note. 8/16/22 The patient's BG went to 73 yesterday afternoon on 16 units Lantus. His BG was 197 this morning. The patient may need an increase in basal dose, however from past hospitalizations, it is noted that the patient's BG's are labile. Therefore, a slow increase is recommended. Consider 22 units Lantus daily. Also consider decreasing meal time to 4 units Humalog with each meal.     Recommendations     Lantus 22 units daily    2. Decrease Humalog to 4 units with each consumed meal.       Billing Code(s)   [] 57878 IP subsequent hospital care - 35 minutes [] 71143 Prolonged Services - 65 minutes [] 75527 Prolonged Services - 110 minutes  [] 04853 IP subsequent hospital care - 25 minutes [] 01775 Prolonged Services - 55 minutes [] 20228 Prolonged Services - 100 minutes  [x] 20156 IP subsequent hospital care - 15 minutes [] 20452 Prolonged Services - 45 minutes [] 35861 Prolonged Services - 90 minutes    Before making these care recommendations, I personally reviewed the hospitalization record, including notes, laboratory & diagnostic data and current medications, and examined the patient at the bedside (circumstances permitting) before making care recommendations. More than fifty (50) percent of the time was spent in patient counseling and/or care coordination.   Total minutes: 15 minutes    ANEESH Olguin  Diabetes Clinical Nurse Specialist  [facility]  Access via [employer]

## 2022-08-16 NOTE — PROGRESS NOTES
Received notification from bedside RN about patient with regards to: unable to hold saturation >88% on NC 5 L  VS: /81, HR 84, RR 19, O2 sat 88% on NC 5 L    [facility] 15 L to keep sats >92%, Duoneb PRN, pro BNP added to AM labs    0104: Notified of episode of epistaxis with patient blowing his nose    - Afrin nasal spray BID PRN, ice application PRN ordered    0159: Noted Pro BNP 11,477  VS: /81, HR 81, RR 19, O2 sat 94% on NHHF 10 L    - Bumex 1 mg IV x 1 dose ordered    0405: Notified of 10/10 pain generalized pain, not yet time for PRN Roxicodone and requesting medication for relief    - Dilaudid 0.5 mg IV x 1 dose ordered    2363: RRT called, patient woke up reporting blurriness of left vision    - vision check done, able to identify objects in left vision appropriately, no other deficit and associated symptoms noted. Patient was laying on his left side when this event happened.  RRT cancelled, asked RN to monitor for progression of symptoms

## 2022-08-16 NOTE — PROGRESS NOTES
March 2, 2022       Cynthia Nicholson MD  [address] [place] [address]  [facility] [id number]  Via In Basket      Patient: Fernanda Chaidez   YOB: 2017   Date of Visit: 3/2/2022       Dear Dr. Nicholson:    Thank you for referring Fernanda Chaidez to me for evaluation. Below are my notes for this visit with her.    If you have questions, please do not hesitate to call me. I look forward to following your patient along with you.      Sincerely,        Obed Toussaint MD        CC: No Recipients  Obed Toussaint MD  3/2/2022  2:05 PM  Signed  Reason For Visit    Chief Complaint   Patient presents with   • Office Visit   Fernanda Chaidez is accompanied by parent.      Referred By  Patient was referred by Cynthia Nicholson MD.     History of Present Illness  29 month old female last seen in early January. I said, \"Patient has mild MARCELLO, but the sleep study may be an underestimate and she has observed apnea in office. I advised flonase for 2 months, and repeat exam.\"    She continues to snore, has mouth breathing.   Does not have pauses in her breathing.  During the day, she has better breathing through her nose.  She did receive flonase, and she was much better.     She      Review of Systems  10 systems were reviewed and are negative aside from those detailed above.     Past Medical History  Significant past medical history:   Past Medical History:   Diagnosis Date   • Prematurity    • Screening for galactosemia 2019     Surgical History  Significant past surgical history:   Past Surgical History:   Procedure Laterality Date   • No past surgeries       Social History  Custody status: Parents have full custody of the patient: yes     Current Meds  Current Outpatient Medications   Medication Sig Dispense Refill   • fluticasone (FLONASE) 50 MCG/ACT nasal spray Spray 1 spray in each nostril daily. 16 g 12   • cetirizine (ZyrTEC) 5 MG/5ML solution Take 2.5 mLs by mouth daily. 75 mL 0     No current  Nephrology Progress Note  [facility] / [address], Smita Ferraro, [address]  Phone - [phone]  Fax - [phone]                 Patient: Kaley Grijalva                   YOB: 1980        Date- 8/16/2022                      Admit Date: 8/11/2022  CC: Follow up for FELECIA on CKD          IMPRESSION & PLAN:    FELECIA stage III on CKD stage IV(secondary to intravenous volume depletion)  CKD stage IV(baseline creatinine 2.8-3.5)(secondary to uncontrolled diabetes)  Anion gap metabolic acidosis sec to DKA  Non-anion gap metabolic acidosis  Diabetic ketoacidosis  Type 1 diabetes uncontrolled  Hyperphosphatemia  Elevated troponins  Community-acquired pneumonia  Acute hypoxic respiratory failure on mechanical ventilation    PLAN-  Noted interval events, patient is getting hypervolemic. With increased oxygen requirements. Responded well to one-time dose of Bumex, with 3.0 L urine output. Patient definitely has anasarca on my exam.  Will start on Bumex 2 mg p.o. twice daily. Creatinine continues to improve. No plans for RRT at this point. But patient is agreeable for RRT if it is needed. Discussed with internal medicine team     Subjective:   Interval History:   -Patient was seen and examined today.  -Has been hypoxemic overnight.  -Hypervolemic on exam      Objective:   Vitals:    08/15/22 2015 08/16/22 0102 08/16/22 0254 08/16/22 0750   BP: (!) 173/81  (!) 178/81 (!) 195/93   Pulse: 84  81 86   Resp:   19 18   Temp: 97.5 °F (36.4 °C)  97.7 °F (36.5 °C) 97.6 °F (36.4 °C)   SpO2: (!) 79% 90% 94% 95%   Weight:       Height:          08/15 0701 - 08/16 0700  In: -   Out: 3300 [Urine:3300]  Last 3 Recorded Weights in this Encounter    08/13/22 0820 08/14/22 0500 08/15/22 0343   Weight: 89.4 kg (197 lb) 89.1 kg (196 lb 6.9 oz) 91 kg (200 lb 9.9 oz)      Physical exam:    GEN: Mild respiratory distress  NECK- Supple, facility-administered medications for this visit.     Vitals  Visit Vitals  Temp 98 °F (36.7 °C) (Temporal)   Wt 13.2 kg (29 lb 1.6 oz)     Physical Exam  General:  The patient is awake, alert, NAD.   Head/Face:  Atraumatic, normocephalic. No dysmorphic features.  Eyes:  Conjunctiva normal.   Gaze conjugate.   Ears:  Right:  normal pinna, external auditory meatus is normal. The external auditory canal clear. Tympanic membrane intact, with normal surface anatomy. Middle ear space aerated. Tympanic membrane is mobile on pneumatic otoscopy.  Left:    normal pinna, external auditory meatus is normal. The external auditory canal clear. Tympanic membrane intact, with normal surface anatomy. Middle ear space aerated. Tympanic membrane is mobile on pneumatic otoscopy.  Nose:   Anterior rhinoscopy clear. Breathing through her mouth.  Oral Cavity:  The vestibule is normal appearing. Oral cavity mucosa is without lesion. The tongue is mobile and midline. Hard Palate is intact and without lesions. No ankyloglossia.  Oropharynx:  Soft palate elevates in the midline. Uvula normal. Tonsils 3+ deep, symmetric. Base of tongue soft without lesions. Posterior oropharyngeal wall clear.  Larynx:   No stridor. Normal voice.  Neck:  normal surface anatomy.   Lymphatic:  No cervical lymphadenopathy.  Neuro:  Alert, no focal deficits.     Assessment  Problem List Items Addressed This Visit     None      Visit Diagnoses     Hypertrophy of tonsils and adenoids    -  Primary    Obstructive sleep apnea            Discussion/Summary    this patient has large tonsils, and she continues to have some degree of obstructive sleep apnea.  Her sleep study was deceptive, and that it showed very mild disease but she did not tolerate nasal cannula and her exam has consistently been much more worrisome than that study.  She seemed to respond to Flonase.  Were not sure that was a permanent response.  She will discontinue the Flonase for now, and then we  no mass  RESP: Basilar rales  CVS: S1,S2  RRR  NEURO: Normal speech, Non focal  EXT: 1-2+ edema  PSYCH: Normal Mood    Chart reviewed. Pertinent Notes reviewed. Data Review :  Recent Labs     08/16/22  0050 08/15/22  0316 08/14/22  0359    138 140   K 4.3 4.0 3.8    106 109*   CO2 19* 19* 19*   BUN 58* 61* 66*   CREA 4.66* 4.86* 5.05*   * 201* 252*   CA 8.3* 8.4* 8.0*   MG  --  2.3  --    PHOS 5.7* 5.7* 5.7*       Recent Labs     08/16/22  0050 08/15/22  0316 08/14/22  0359   WBC 8.3 6.3 9.2   HGB 8.9* 10.4* 8.0*   HCT 26.9* 31.5* 24.5*    270 325       No results for input(s): FE, TIBC, PSAT, FERR in the last 72 hours.    Medication list  reviewed  Current Facility-Administered Medications   Medication Dose Route Frequency    oxymetazoline (AFRIN) 0.05 % nasal spray 2 Spray  2 Spray Both Nostrils BID PRN    bumetanide (BUMEX) tablet 2 mg  2 mg Oral BID    [START ON 8/17/2022] insulin glargine (LANTUS) injection 20 Units  20 Units SubCUTAneous DAILY    levoFLOXacin (LEVAQUIN) tablet 500 mg  500 mg Oral Q48H    oxyCODONE IR (ROXICODONE) tablet 5 mg  5 mg Oral Q6H PRN    insulin lispro (HUMALOG) injection 7 Units  7 Units SubCUTAneous TIDAC    sodium chloride (OCEAN) 0.65 % nasal squeeze bottle 2 Spray  2 Spray Both Nostrils Q2H PRN    albuterol-ipratropium (DUO-NEB) 2.5 MG-0.5 MG/3 ML  3 mL Nebulization Q4H PRN    pantoprazole (PROTONIX) tablet 40 mg  40 mg Oral ACB    cloNIDine (CATAPRES) 0.1 mg/24 hr patch 1 Patch  1 Patch TransDERmal Q7D    heparin (porcine) injection 5,000 Units  5,000 Units SubCUTAneous Q8H    rosuvastatin (CRESTOR) tablet 10 mg  10 mg Oral QHS    aspirin delayed-release tablet 81 mg  81 mg Oral DAILY    labetaloL (NORMODYNE;TRANDATE) injection 20 mg  20 mg IntraVENous Q4H PRN    hydrALAZINE (APRESOLINE) 20 mg/mL injection 20 mg  20 mg IntraVENous Q6H PRN    sodium bicarbonate tablet 650 mg  650 mg Oral TID    glucose chewable tablet 16 g  4 Tablet Oral PRN glucagon (GLUCAGEN) injection 1 mg  1 mg IntraMUSCular PRN    dextrose 10% infusion 0-250 mL  0-250 mL IntraVENous PRN    carvediloL (COREG) tablet 12.5 mg  12.5 mg Oral BID    amLODIPine (NORVASC) tablet 10 mg  10 mg Oral DAILY    insulin lispro (HUMALOG) injection   SubCUTAneous AC&HS    senna-docusate (PERICOLACE) 8.6-50 mg per tablet 1 Tablet  1 Tablet Oral DAILY    sodium chloride (NS) flush 5-40 mL  5-40 mL IntraVENous Q8H    sodium chloride (NS) flush 5-40 mL  5-40 mL IntraVENous PRN    acetaminophen (TYLENOL) tablet 650 mg  650 mg Oral Q6H PRN    Or    acetaminophen (TYLENOL) suppository 650 mg  650 mg Rectal Q6H PRN    polyethylene glycol (MIRALAX) packet 17 g  17 g Oral DAILY PRN    ondansetron (ZOFRAN) injection 4 mg  4 mg IntraVENous Q6H PRN    chlorhexidine (ORAL CARE KIT) 0.12 % mouthwash 15 mL  15 mL Oral Q12H    0.9% sodium chloride infusion 250 mL  250 mL IntraVENous PRN    balsam peru-castor oiL (VENELEX) ointment   Topical Q12H    alcohol 62% (NOZIN) nasal  1 Ampule  1 Ampule Topical Q12H          Pebbles Thomason MD  8/16/2022 will see how she does.  I will check in with mom over the summer, and around her [age] birthday if she has persistent issues, we will complete adenotonsillectomy.     Obed Toussaint MD

## 2022-08-16 NOTE — PROGRESS NOTES
Spiritual Care Assessment/Progress Note  [facility]      NAME: Tammy Palm      MRN: [id number]  AGE: 46 y.o. SEX: male  Restorationist Affiliation: Shinto   Language: English     8/16/2022     Total Time (in minutes): 13     Spiritual Assessment begun in [facility] 2 MED TELE through conversation with:         [x]Patient        [] Family    [] Friend(s)        Reason for Consult: Initial/Spiritual assessment, patient floor     Spiritual beliefs: (Please include comment if needed)     [] Identifies with a keerthi tradition:         [] Supported by a keerthi community:            [] Claims no spiritual orientation:           [] Seeking spiritual identity:                [] Adheres to an individual form of spirituality:           [x] Not able to assess:                           Identified resources for coping:      [] Prayer                               [] Music                  [] Guided Imagery     [] Family/friends                 [] Pet visits     [] Devotional reading                         [x] Unknown     [] Other:                                               Interventions offered during this visit: (See comments for more details)    Patient Interventions: Other (comment)Patient sleep          Plan of Care:     [x] Support spiritual and/or cultural needs    [] Support AMD and/or advance care planning process      [] Support grieving process   [] Coordinate Rites and/or Rituals    [] Coordination with community clergy   [] No spiritual needs identified at this time   [] Detailed Plan of Care below (See Comments)  [] Make referral to Music Therapy  [] Make referral to Pet Therapy     [] Make referral to Addiction services  [] Make referral to [facility]  [] Make referral to Spiritual Care Partner  [] No future visits requested        [x] Contact Spiritual Care for further referrals     Comments:  reviewed the patient's chart prior to the visit.  Mr. Dhiraj Titus was asleep when the [facility] entered his room.  services are available 24 hours a day as requested. Rev. MARCI Meredith  Min   Paging Service [phone])

## 2022-08-16 NOTE — PROGRESS NOTES
Hospitalist Progress Note    NAME: Gutierrez Mayo   :  1980   MRN:  [id number]       Assessment / Plan:  45 y/o male history of poorly controlled T1DM, bipolar disorder, and CKD 3 admitted  for acute hypoxic respiratory failure in the setting of severe diabetic ketoacidosis. EMS called for generalized lethargy and increased work of breathing. Had DKA on presentation, was obtunded, got intubated  and extubated . Acute respiratory failure requiring intubation   Community acquired pneumonia POA  Acute pulmonary edema  Anasarca    Intubated on , likely d/t respiratory failure from acidosis  Extubated on   C/w Levofloxacin, 7 day course  Was placed on high flow last night. CXR showing Acute pulmonary edema, bilateral pleural effusions, this morning doing better on 4 liters looks comfortable. Bumex increased to 2 mg q12 hours  3 liters urine output in last 24 hours  Discussed with nephrology, no need for urgent HD, responded to bumex. FELECIA on CKD Stage 4:  Creatinine improving slowly  No urgent need for HD  C/w bicarb  Nephrology following  Made 3 liters urine in last 24 hours    DKA  DKA resolved  C/w lantus and lispro    Elevated troponin:  Hypertensive urgency:  Bradycardia  Believed to be demand ischemia  C/w clonidine, amlodipine, carvedilol dose increased, BP remains uncontrolled  Hydralazine PRN  Cardiology signed off  ECHO normal ef. Normal diastolic fxn, small pericardial effusion    Scrotal swelling:  Could be d/t fluid overload from CKD  Also has some pain  Scrotal sono: shows superficial soft tissue edema    Slurred speech on   ? laceration on posterior scalp:  Doesn't remember having any trauma  CT head was done for ?slurred speech, negative  Seems at baseline now    Estimated discharge date:   Barriers: Scrotal swelling/ pain/ anasarca/ PT/OT consult    Code status: Full  Prophylaxis: Lovenox  Recommended Disposition: Home w/Family     Subjective:     Chief Complaint / Reason for Physician Visit  Follow up for DKA  Discussed with RN about overnight, was placed on high flow, now on 4 liters oxygen, looks comfortable    Review of Systems:  Symptom Y/N Comments  Symptom Y/N Comments   Fever/Chills n   Chest Pain n    Poor Appetite n   Edema n    Cough    Abdominal Pain     Sputum    Joint Pain     SOB/JOHNSTON    Pruritis/Rash     Nausea/vomit    Tolerating PT/OT     Diarrhea    Tolerating Diet     Constipation    Other x Swelling, pain     Could NOT obtain due to:      Objective:     VITALS:   Last 24hrs VS reviewed since prior progress note. Most recent are:  Patient Vitals for the past 24 hrs:   Temp Pulse Resp BP SpO2   08/16/22 1048 98 °F (36.7 °C) 85 19 (!) 184/87 98 %   08/16/22 0750 97.6 °F (36.4 °C) 86 18 (!) 195/93 95 %   08/16/22 0254 97.7 °F (36.5 °C) 81 19 (!) 178/81 94 %   08/16/22 0102 -- -- -- -- 90 %   08/15/22 2015 97.5 °F (36.4 °C) 84 -- (!) 173/81 (!) 79 %   08/15/22 1644 -- -- -- (!) 179/104 --   08/15/22 1600 97.7 °F (36.5 °C) -- -- -- 91 %   08/15/22 1528 -- 88 19 (!) 131/115 90 %       Intake/Output Summary (Last 24 hours) at 8/16/2022 1343  Last data filed at 8/16/2022 7920  Gross per 24 hour   Intake --   Output 2375 ml   Net -2375 ml        I had a face to face encounter and independently examined this patient on 8/16/2022, as outlined below:  PHYSICAL EXAM:  General: Awake, No acute distress  EENT:  EOMI. Anicteric sclerae. MMM  Resp:  CTA bilaterally, no wheezing or rales. No accessory muscle use  CV:  Regular  rhythm,  No edema  GI:  Soft, Non distended, Non tender.   +Bowel sounds  : Scrotal edema and redness  Neurologic:  Alert and oriented X 3, normal speech,   Psych:   Not anxious nor agitated  Ext: B/l Le edema    Reviewed most current lab test results and cultures  YES  Reviewed most current radiology test results   YES  Review and summation of old records today    NO  Reviewed patient's current orders and MAR    YES  PMH/SH reviewed - no change compared to H&P  ________________________________________________________________________  Care Plan discussed with:    Comments   Patient y    Family      RN y    Care Manager     Consultant                       y Multidiciplinary team rounds were held today with , nursing, pharmacist and clinical coordinator. Patient's plan of care was discussed; medications were reviewed and discharge planning was addressed. ________________________________________________________________________  Total NON critical care TIME:  36   Minutes    Total CRITICAL CARE TIME Spent:   Minutes non procedure based      Comments   >50% of visit spent in counseling and coordination of care     ________________________________________________________________________  Stacy Cogan, MD     Procedures: see electronic medical records for all procedures/Xrays and details which were not copied into this note but were reviewed prior to creation of Plan. LABS:  I reviewed today's most current labs and imaging studies.   Pertinent labs include:  Recent Labs     08/16/22  0050 08/15/22  0316 08/14/22  0359   WBC 8.3 6.3 9.2   HGB 8.9* 10.4* 8.0*   HCT 26.9* 31.5* 24.5*    270 325     Recent Labs     08/16/22  0050 08/15/22  0316 08/14/22  0359    138 140   K 4.3 4.0 3.8    106 109*   CO2 19* 19* 19*   * 201* 252*   BUN 58* 61* 66*   CREA 4.66* 4.86* 5.05*   CA 8.3* 8.4* 8.0*   MG  --  2.3  --    PHOS 5.7* 5.7* 5.7*   ALB 2.1* 2.1* 2.1*   TBILI  --   --  0.4   ALT  --   --  25       Signed: Stacy Cogan, MD

## 2022-08-16 NOTE — PROGRESS NOTES
2105 Received patient from CCU with report baseline 1+-2+ edema, O2/NC UP TO 4l as needed that pt was 91% RA and lungs clear. 2200 this nurse notes patient O2 is 81%RA with tech assist patient is placed on 02 5L/NC MD notified. Patient only able to reach 88% on 5L/ NC   Orders for high isabel initiated  RT came placed patient on Hi isabel up to 15L as needed to get patient above 91%      [facility] to keep sats >92%, Duoneb PRN, pro BNP added to AM labs(per md)     0100 This nurse notified by patient of epistaxis with hiflo and noticeable shift in edema from 1+-2+ to 3+-4+ MELISSA AND BLE. This nurse notified MD and new orders placed for Pro BNP and nasal spray. Afrin nasal spray BID PRN, ice application PRN ordered(per md)  0130 This nurse received note about labs returning and notified md and rapid nurse on shift     0159: Noted Pro BNP 11,477  VS: /81, HR 81, RR 19, O2 sat 94% on NHHF 10 L     - Bumex 1 mg IV x 1 dose ordered (per md)    0245 This nurse noted patient edema increasing, o2 sats not improving, urine output not increasing and results of jose de jesus ordered returned. Rapid nurse called and interventions put in place. Patient resuming baseline once interventions put in place. 0300:     Hi flow O2 withdrawn and 4L/NC initiated at 92%. Beard inserted 16f. 4 bumex given per orders by RN rapid nurse.      0405: Notified of 10/10 pain generalized pain, not yet time for PRN Roxicodone and requesting medication for relief     - Dilaudid 0.5 mg IV x 1 dose ordered

## 2022-08-16 NOTE — PROGRESS NOTES
Chart reviewed. Discussed with nurse ,and pt with decline in medical status with rapid response calls, increased BP and blood sugars, working on recovery with interventions in place. Will defer and continue to follow as appropriate.     Thank you  Rufina Pritchett MS OTR/L

## 2022-08-16 NOTE — PROGRESS NOTES
Transition of Care Plan:    RUR: 35 (high)  Disposition: Home with HH? and follow ups. Follow up appointments: PCP, Nephrology  DME needed: TBD, patient said he needs rollator. Transportation at Discharge: Mother to transport. Keys or means to access home:  Patient has keys. IM Medicare Letter: N/A  Is patient a  and connected with the [place]? N/A        If yes, was [employer] transfer form completed and [facility] notified? N/A  Caregiver Contact: Sarah Tobin - mother - [phone]  Discharge Caregiver contacted prior to discharge? Patient to contact mother. Care Conference needed?:  No.             Reason for Admission:   DKA               RUR Score:  35% (high)       PCP: First and Last name:  Bradley Hartley MD     Name of Practice: [facility]   Are you a current patient: Yes/No: Yes   Approximate date of last visit: 7/14/2022. Can you do a virtual visit with your PCP: No.             Resources/supports as identified by patient/family:   Patient has mother to provide transport and support at home. Patient interested in [facility]. Top Challenges facing patient (as identified by patient/family and CM): Finances/Medication cost?      Patient able to receive medications without issue at home. Transportation? Mother can drive patient. Support system or lack thereof? Patient has mother to provide support. Living arrangements? Patient lives with mother. Self-care/ADLs/Cognition? Patient interested in [facility] to assist with ADLs.           Current Advanced Directive/Advance Care Plan:  Full Code  Advance Care Planning     General Advance Care Planning (ACP) Conversation    Date of Conversation: 8/16/2022  Conducted with: Patient with Decision Making Capacity    Healthcare Decision Maker:     Primary Decision Maker: Dorothy Salinas - Mother - [phone]  Click here to complete [address] including selection of the Healthcare Decision Maker Relationship (ie \"Primary\")    Today we documented Decision Maker(s) consistent with Legal Next of Kin hierarchy. Content/Action Overview:   Has NO ACP documents/care preferences - information provided, considering goals and options  Reviewed DNR/DNI and patient elects Full Code (Attempt Resuscitation)  Topics discussed: treatment goals    Length of Voluntary ACP Conversation in minutes:  <16 minutes (Non-Billable)    Jonathan Arce RN            Healthcare Decision Maker:   Click here to complete [address] including selection of the Healthcare Decision Maker Relationship (ie \"Primary\")      Primary Decision Maker: Jeff Beard - Mother - [phone]    Payor Source Payor: [facility] MEDICAID / Plan: Jack Killings / Product Type: Managed Care Medicaid /                           Plan for utilizing home health:    Patient has never used [facility] or rehab in the past but open to options. Transition of Care Plan:                CM present in room to introduce self/role and verify patient's demographic, contact, insurance and PCP information. CM updated patient's home address. He lives with mother, uncle and aunt in uncles's one story home with 6 steps to get inside the home but there is a ramp in the back. He said he previously had a prescription from his PCP to get a rollator but has not been able to yet. PT/OT unable to see today, patient would like to see PT/OT to have recommendations for SNF or HH. Care Management Interventions  PCP Verified by CM:  Yes  Last Visit to PCP: 07/14/22  Palliative Care Criteria Met (RRAT>21 & CHF Dx)?: No  Mode of Transport at Discharge: Self  Transition of Care Consult (CM Consult): Discharge Planning  MyChart Signup: No  Discharge Durable Medical Equipment: No  Physical Therapy Consult: No  Occupational Therapy Consult: No  Speech Therapy Consult: No  Support Systems: Parent(s)  Confirm Follow Up Transport: Family  The Patient and/or Patient Representative was Provided with a Choice of Provider and Agrees with the Discharge Plan?: No  Freedom of Choice List was Provided with Basic Dialogue that Supports the Patient's Individualized Plan of Care/Goals, Treatment Preferences and Shares the Quality Data Associated with the Providers?: No   Resource Information Provided?: No  Discharge Location  Patient Expects to be Discharged to[de-identified] Home      BRET Begum, RN    Care Management  [phone]

## 2022-08-17 LAB
ALBUMIN SERPL-MCNC: 2 G/DL (ref 3.5–5)
ALBUMIN/GLOB SERPL: 0.5 {RATIO} (ref 1.1–2.2)
ALP SERPL-CCNC: 142 U/L (ref 45–117)
ALT SERPL-CCNC: 22 U/L (ref 12–78)
ANION GAP SERPL CALC-SCNC: 10 MMOL/L (ref 5–15)
AST SERPL-CCNC: 18 U/L (ref 15–37)
BACTERIA SPEC CULT: NORMAL
BASOPHILS # BLD: 0 K/UL (ref 0–0.1)
BASOPHILS NFR BLD: 1 % (ref 0–1)
BILIRUB DIRECT SERPL-MCNC: 0.1 MG/DL (ref 0–0.2)
BILIRUB SERPL-MCNC: 0.2 MG/DL (ref 0.2–1)
BUN SERPL-MCNC: 61 MG/DL (ref 6–20)
BUN/CREAT SERPL: 14 (ref 12–20)
CALCIUM SERPL-MCNC: 8.5 MG/DL (ref 8.5–10.1)
CHLORIDE SERPL-SCNC: 103 MMOL/L (ref 97–108)
CO2 SERPL-SCNC: 22 MMOL/L (ref 21–32)
CREAT SERPL-MCNC: 4.51 MG/DL (ref 0.7–1.3)
DIFFERENTIAL METHOD BLD: ABNORMAL
EOSINOPHIL # BLD: 0.4 K/UL (ref 0–0.4)
EOSINOPHIL NFR BLD: 6 % (ref 0–7)
ERYTHROCYTE [DISTWIDTH] IN BLOOD BY AUTOMATED COUNT: 15.8 % (ref 11.5–14.5)
GLOBULIN SER CALC-MCNC: 3.7 G/DL (ref 2–4)
GLUCOSE BLD STRIP.AUTO-MCNC: 106 MG/DL (ref 65–117)
GLUCOSE BLD STRIP.AUTO-MCNC: 111 MG/DL (ref 65–117)
GLUCOSE BLD STRIP.AUTO-MCNC: 124 MG/DL (ref 65–117)
GLUCOSE BLD STRIP.AUTO-MCNC: 56 MG/DL (ref 65–117)
GLUCOSE BLD STRIP.AUTO-MCNC: 74 MG/DL (ref 65–117)
GLUCOSE SERPL-MCNC: 100 MG/DL (ref 65–100)
HCT VFR BLD AUTO: 28.3 % (ref 36.6–50.3)
HGB BLD-MCNC: 9.4 G/DL (ref 12.1–17)
IMM GRANULOCYTES # BLD AUTO: 0 K/UL (ref 0–0.04)
IMM GRANULOCYTES NFR BLD AUTO: 1 % (ref 0–0.5)
LYMPHOCYTES # BLD: 1.3 K/UL (ref 0.8–3.5)
LYMPHOCYTES NFR BLD: 18 % (ref 12–49)
MAGNESIUM SERPL-MCNC: 2.3 MG/DL (ref 1.6–2.4)
MCH RBC QN AUTO: 29.1 PG (ref 26–34)
MCHC RBC AUTO-ENTMCNC: 33.2 G/DL (ref 30–36.5)
MCV RBC AUTO: 87.6 FL (ref 80–99)
MONOCYTES # BLD: 0.8 K/UL (ref 0–1)
MONOCYTES NFR BLD: 11 % (ref 5–13)
NEUTS SEG # BLD: 4.5 K/UL (ref 1.8–8)
NEUTS SEG NFR BLD: 63 % (ref 32–75)
NRBC # BLD: 0 K/UL (ref 0–0.01)
NRBC BLD-RTO: 0 PER 100 WBC
PHOSPHATE SERPL-MCNC: 5.8 MG/DL (ref 2.6–4.7)
PLATELET # BLD AUTO: 304 K/UL (ref 150–400)
PMV BLD AUTO: 10.3 FL (ref 8.9–12.9)
POTASSIUM SERPL-SCNC: 4.1 MMOL/L (ref 3.5–5.1)
PROT SERPL-MCNC: 5.7 G/DL (ref 6.4–8.2)
RBC # BLD AUTO: 3.23 M/UL (ref 4.1–5.7)
SERVICE CMNT-IMP: ABNORMAL
SERVICE CMNT-IMP: ABNORMAL
SERVICE CMNT-IMP: NORMAL
SODIUM SERPL-SCNC: 135 MMOL/L (ref 136–145)
WBC # BLD AUTO: 7.1 K/UL (ref 4.1–11.1)

## 2022-08-17 PROCEDURE — 80076 HEPATIC FUNCTION PANEL: CPT

## 2022-08-17 PROCEDURE — 74011250636 HC RX REV CODE- 250/636: Performed by: INTERNAL MEDICINE

## 2022-08-17 PROCEDURE — 82962 GLUCOSE BLOOD TEST: CPT

## 2022-08-17 PROCEDURE — 36415 COLL VENOUS BLD VENIPUNCTURE: CPT

## 2022-08-17 PROCEDURE — P9047 ALBUMIN (HUMAN), 25%, 50ML: HCPCS | Performed by: NURSE PRACTITIONER

## 2022-08-17 PROCEDURE — 83735 ASSAY OF MAGNESIUM: CPT

## 2022-08-17 PROCEDURE — 74011250637 HC RX REV CODE- 250/637: Performed by: INTERNAL MEDICINE

## 2022-08-17 PROCEDURE — 74011250636 HC RX REV CODE- 250/636: Performed by: NURSE PRACTITIONER

## 2022-08-17 PROCEDURE — 97165 OT EVAL LOW COMPLEX 30 MIN: CPT

## 2022-08-17 PROCEDURE — 74011636637 HC RX REV CODE- 636/637: Performed by: STUDENT IN AN ORGANIZED HEALTH CARE EDUCATION/TRAINING PROGRAM

## 2022-08-17 PROCEDURE — 97535 SELF CARE MNGMENT TRAINING: CPT

## 2022-08-17 PROCEDURE — 74011000250 HC RX REV CODE- 250: Performed by: NURSE PRACTITIONER

## 2022-08-17 PROCEDURE — 51798 US URINE CAPACITY MEASURE: CPT

## 2022-08-17 PROCEDURE — 65270000029 HC RM PRIVATE

## 2022-08-17 PROCEDURE — 74011250637 HC RX REV CODE- 250/637: Performed by: NURSE PRACTITIONER

## 2022-08-17 PROCEDURE — 94760 N-INVAS EAR/PLS OXIMETRY 1: CPT

## 2022-08-17 PROCEDURE — 97530 THERAPEUTIC ACTIVITIES: CPT

## 2022-08-17 PROCEDURE — 99231 SBSQ HOSP IP/OBS SF/LOW 25: CPT

## 2022-08-17 PROCEDURE — 80048 BASIC METABOLIC PNL TOTAL CA: CPT

## 2022-08-17 PROCEDURE — 97162 PT EVAL MOD COMPLEX 30 MIN: CPT

## 2022-08-17 PROCEDURE — 74011250637 HC RX REV CODE- 250/637: Performed by: HOSPITALIST

## 2022-08-17 PROCEDURE — 74011250636 HC RX REV CODE- 250/636

## 2022-08-17 PROCEDURE — 84100 ASSAY OF PHOSPHORUS: CPT

## 2022-08-17 PROCEDURE — 74011250637 HC RX REV CODE- 250/637: Performed by: STUDENT IN AN ORGANIZED HEALTH CARE EDUCATION/TRAINING PROGRAM

## 2022-08-17 PROCEDURE — P9047 ALBUMIN (HUMAN), 25%, 50ML: HCPCS | Performed by: INTERNAL MEDICINE

## 2022-08-17 PROCEDURE — 85025 COMPLETE CBC W/AUTO DIFF WBC: CPT

## 2022-08-17 RX ORDER — ALBUMIN HUMAN 250 G/1000ML
12.5 SOLUTION INTRAVENOUS ONCE
Status: COMPLETED | OUTPATIENT
Start: 2022-08-17 | End: 2022-08-17

## 2022-08-17 RX ORDER — BUMETANIDE 0.25 MG/ML
2 INJECTION INTRAMUSCULAR; INTRAVENOUS ONCE
Status: COMPLETED | OUTPATIENT
Start: 2022-08-17 | End: 2022-08-17

## 2022-08-17 RX ORDER — HYDROMORPHONE HYDROCHLORIDE 1 MG/ML
0.5 INJECTION, SOLUTION INTRAMUSCULAR; INTRAVENOUS; SUBCUTANEOUS
Status: COMPLETED | OUTPATIENT
Start: 2022-08-17 | End: 2022-08-17

## 2022-08-17 RX ORDER — ALBUMIN HUMAN 250 G/1000ML
25 SOLUTION INTRAVENOUS EVERY 6 HOURS
Status: DISCONTINUED | OUTPATIENT
Start: 2022-08-17 | End: 2022-08-18

## 2022-08-17 RX ORDER — MORPHINE SULFATE 2 MG/ML
1 INJECTION, SOLUTION INTRAMUSCULAR; INTRAVENOUS
Status: DISCONTINUED | OUTPATIENT
Start: 2022-08-17 | End: 2022-08-20 | Stop reason: HOSPADM

## 2022-08-17 RX ORDER — HYDROMORPHONE HYDROCHLORIDE 1 MG/ML
INJECTION, SOLUTION INTRAMUSCULAR; INTRAVENOUS; SUBCUTANEOUS
Status: COMPLETED
Start: 2022-08-17 | End: 2022-08-17

## 2022-08-17 RX ORDER — BUMETANIDE 1 MG/1
2 TABLET ORAL 3 TIMES DAILY
Status: DISCONTINUED | OUTPATIENT
Start: 2022-08-17 | End: 2022-08-18

## 2022-08-17 RX ADMIN — ALBUMIN (HUMAN) 12.5 G: 0.25 INJECTION, SOLUTION INTRAVENOUS at 05:00

## 2022-08-17 RX ADMIN — SENNOSIDES AND DOCUSATE SODIUM 1 TABLET: 50; 8.6 TABLET ORAL at 09:01

## 2022-08-17 RX ADMIN — SODIUM CHLORIDE, PRESERVATIVE FREE 10 ML: 5 INJECTION INTRAVENOUS at 22:15

## 2022-08-17 RX ADMIN — ASPIRIN 81 MG: 81 TABLET, COATED ORAL at 09:01

## 2022-08-17 RX ADMIN — CASTOR OIL AND BALSAM, PERU: 788; 87 OINTMENT TOPICAL at 09:06

## 2022-08-17 RX ADMIN — ALBUMIN (HUMAN) 25 G: 0.25 INJECTION, SOLUTION INTRAVENOUS at 12:24

## 2022-08-17 RX ADMIN — HYDROMORPHONE HYDROCHLORIDE 0.5 MG: 1 INJECTION, SOLUTION INTRAMUSCULAR; INTRAVENOUS; SUBCUTANEOUS at 02:00

## 2022-08-17 RX ADMIN — OXYCODONE 5 MG: 5 TABLET ORAL at 09:01

## 2022-08-17 RX ADMIN — Medication 7 UNITS: at 17:00

## 2022-08-17 RX ADMIN — SODIUM BICARBONATE 650 MG: 650 TABLET ORAL at 22:13

## 2022-08-17 RX ADMIN — LEVOFLOXACIN 750 MG: 750 TABLET, FILM COATED ORAL at 22:13

## 2022-08-17 RX ADMIN — CARVEDILOL 25 MG: 12.5 TABLET, FILM COATED ORAL at 12:22

## 2022-08-17 RX ADMIN — BUMETANIDE 2 MG: 1 TABLET ORAL at 16:47

## 2022-08-17 RX ADMIN — OXYCODONE 5 MG: 5 TABLET ORAL at 22:42

## 2022-08-17 RX ADMIN — SODIUM CHLORIDE, PRESERVATIVE FREE 10 ML: 5 INJECTION INTRAVENOUS at 16:47

## 2022-08-17 RX ADMIN — PANTOPRAZOLE SODIUM 40 MG: 40 TABLET, DELAYED RELEASE ORAL at 09:01

## 2022-08-17 RX ADMIN — Medication 1 AMPULE: at 21:00

## 2022-08-17 RX ADMIN — MORPHINE SULFATE 1 MG: 2 INJECTION, SOLUTION INTRAMUSCULAR; INTRAVENOUS at 12:22

## 2022-08-17 RX ADMIN — ALBUMIN (HUMAN) 25 G: 0.25 INJECTION, SOLUTION INTRAVENOUS at 23:28

## 2022-08-17 RX ADMIN — SODIUM CHLORIDE, PRESERVATIVE FREE 10 ML: 5 INJECTION INTRAVENOUS at 05:01

## 2022-08-17 RX ADMIN — ALBUMIN (HUMAN) 25 G: 0.25 INJECTION, SOLUTION INTRAVENOUS at 18:27

## 2022-08-17 RX ADMIN — ROSUVASTATIN CALCIUM 10 MG: 10 TABLET, FILM COATED ORAL at 22:13

## 2022-08-17 RX ADMIN — BUMETANIDE 2 MG: 1 TABLET ORAL at 09:01

## 2022-08-17 RX ADMIN — HEPARIN SODIUM 5000 UNITS: 5000 INJECTION INTRAVENOUS; SUBCUTANEOUS at 18:27

## 2022-08-17 RX ADMIN — CHLORHEXIDINE GLUCONATE 15 ML: 1.2 RINSE ORAL at 21:00

## 2022-08-17 RX ADMIN — HEPARIN SODIUM 5000 UNITS: 5000 INJECTION INTRAVENOUS; SUBCUTANEOUS at 12:21

## 2022-08-17 RX ADMIN — AMLODIPINE BESYLATE 10 MG: 5 TABLET ORAL at 12:22

## 2022-08-17 RX ADMIN — OXYCODONE 5 MG: 5 TABLET ORAL at 16:47

## 2022-08-17 RX ADMIN — BUMETANIDE 2 MG: 0.25 INJECTION INTRAMUSCULAR; INTRAVENOUS at 05:00

## 2022-08-17 RX ADMIN — CARVEDILOL 25 MG: 12.5 TABLET, FILM COATED ORAL at 16:47

## 2022-08-17 RX ADMIN — HYDRALAZINE HYDROCHLORIDE 20 MG: 20 INJECTION INTRAMUSCULAR; INTRAVENOUS at 20:27

## 2022-08-17 RX ADMIN — SODIUM BICARBONATE 650 MG: 650 TABLET ORAL at 16:47

## 2022-08-17 RX ADMIN — BUMETANIDE 2 MG: 1 TABLET ORAL at 22:13

## 2022-08-17 RX ADMIN — MORPHINE SULFATE 1 MG: 2 INJECTION, SOLUTION INTRAMUSCULAR; INTRAVENOUS at 20:39

## 2022-08-17 RX ADMIN — SODIUM BICARBONATE 650 MG: 650 TABLET ORAL at 09:00

## 2022-08-17 RX ADMIN — CASTOR OIL AND BALSAM, PERU: 788; 87 OINTMENT TOPICAL at 22:14

## 2022-08-17 RX ADMIN — INSULIN GLARGINE 20 UNITS: 100 INJECTION, SOLUTION SUBCUTANEOUS at 08:59

## 2022-08-17 NOTE — PROGRESS NOTES
Hospitalist Progress Note    NAME: Len Sal   :  1980   MRN:  [id number]       Assessment / Plan:  47 y/o male history of poorly controlled T1DM, bipolar disorder, and CKD 3 admitted  for acute hypoxic respiratory failure in the setting of severe diabetic ketoacidosis. EMS called for generalized lethargy and increased work of breathing. Had DKA on presentation, was obtunded, got intubated  and extubated . Acute respiratory failure requiring intubation   Community acquired pneumonia POA  Acute pulmonary edema  Anasarca    Intubated on , likely d/t respiratory failure from acidosis  Extubated on   C/w Levofloxacin, 7 day course  Was placed on high flow last night. CXR showing Acute pulmonary edema, bilateral pleural effusions, this morning doing better on 4 liters looks comfortable. Bumex increased to 2 mg TID  Voiding trial today  Discussed with nephrology, no need for urgent HD, responded to bumex. FELECIA on CKD Stage 4:  Creatinine is stable  No urgent need for HD. He is making good UO  C/w PO bicarb  Nephrology following    DKA  DKA resolved  C/w lantus and lispro    Elevated troponin  Hypertensive urgency  Bradycardia  Believed to be demand ischemia  Adjust antihypertensives  Cardiology signed off  ECHO normal ef. Normal diastolic fxn, small pericardial effusion    Scrotal swelling  Could be d/t fluid overload from CKD  Also has some pain  Scrotal sono: shows superficial soft tissue edema  Cont' elevation. Bumex as above    Slurred speech on   ? laceration on posterior scalp:  Doesn't remember having any trauma  CT head was done for ?slurred speech, negative  Seems at baseline now    Estimated discharge date:   Barriers: Scrotal swelling/ pain/ anasarca/ PT/OT consult    Code status: Full  Prophylaxis: Lovenox  Recommended Disposition: Home w/Family     Subjective:     Chief Complaint / Reason for Physician Visit  Follow up for DKA  C/o of scrotum edema and pain    Review of Systems:  Symptom Y/N Comments  Symptom Y/N Comments   Fever/Chills n   Chest Pain n    Poor Appetite n   Edema n    Cough    Abdominal Pain     Sputum    Joint Pain     SOB/JOHNSTON    Pruritis/Rash     Nausea/vomit    Tolerating PT/OT     Diarrhea    Tolerating Diet     Constipation    Other x Swelling, pain     Could NOT obtain due to:      Objective:     VITALS:   Last 24hrs VS reviewed since prior progress note. Most recent are:  Patient Vitals for the past 24 hrs:   Temp Pulse Resp BP SpO2   08/17/22 1337 -- 87 -- (!) 153/91 --   08/17/22 1138 -- 94 -- (!) 200/100 --   08/17/22 1131 -- -- -- (!) 180/53 --   08/17/22 0900 -- 94 -- (!) 177/94 --   08/17/22 0824 97.8 °F (36.6 °C) 68 18 (!) 106/53 95 %   08/17/22 0400 -- -- -- 119/63 --   08/17/22 0058 -- -- -- (!) 214/105 --   08/16/22 2358 -- -- -- (!) 172/84 --   08/16/22 2316 98 °F (36.7 °C) 88 20 (!) 197/100 93 %   08/16/22 1955 98.6 °F (37 °C) 85 18 (!) 178/91 95 %   08/16/22 1947 -- -- -- -- 95 %   08/16/22 1706 -- -- -- -- 94 %   08/16/22 1520 98.3 °F (36.8 °C) 86 18 (!) 184/86 98 %         Intake/Output Summary (Last 24 hours) at 8/17/2022 1457  Last data filed at 8/17/2022 1440  Gross per 24 hour   Intake --   Output 5625 ml   Net -5625 ml          I had a face to face encounter and independently examined this patient on 8/17/2022, as outlined below:  PHYSICAL EXAM:  General: Awake, No acute distress  EENT:  EOMI. Anicteric sclerae. MMM  Resp:  CTA bilaterally, no wheezing or rales. No accessory muscle use  CV:  Regular  rhythm,  scrotal edema  GI:  Soft, Non distended, Non tender.   +Bowel sounds  : Scrotal edema and redness  Neurologic:  Alert and oriented X 3, normal speech,   Psych:   Not anxious nor agitated  Ext: B/l Le edema    Reviewed most current lab test results and cultures  YES  Reviewed most current radiology test results   YES  Review and summation of old records today    NO  Reviewed patient's current orders and MAR YES  PMH/SH reviewed - no change compared to H&P  ________________________________________________________________________  Care Plan discussed with:    Comments   Patient y    Family      RN y    Care Manager     Consultant  y Dr Ariana Bravo                    y [address] team rounds were held today with , nursing, pharmacist and clinical coordinator. Patient's plan of care was discussed; medications were reviewed and discharge planning was addressed. ________________________________________________________________________  Total NON critical care TIME:  36   Minutes    Total CRITICAL CARE TIME Spent:   Minutes non procedure based      Comments   >50% of visit spent in counseling and coordination of care     ________________________________________________________________________  Chacha Del Toro MD     Procedures: see electronic medical records for all procedures/Xrays and details which were not copied into this note but were reviewed prior to creation of Plan. LABS:  I reviewed today's most current labs and imaging studies.   Pertinent labs include:  Recent Labs     08/17/22 0106 08/16/22  0050 08/15/22  0316   WBC 7.1 8.3 6.3   HGB 9.4* 8.9* 10.4*   HCT 28.3* 26.9* 31.5*    298 270       Recent Labs     08/17/22 0106 08/16/22  0050 08/15/22  0316   * 136 138   K 4.1 4.3 4.0    106 106   CO2 22 19* 19*    120* 201*   BUN 61* 58* 61*   CREA 4.51* 4.66* 4.86*   CA 8.5 8.3* 8.4*   MG 2.3  --  2.3   PHOS 5.8* 5.7* 5.7*   ALB 2.0* 2.1* 2.1*   TBILI 0.2  --   --    ALT 22  --   --          Signed: Chacha Del Toro MD

## 2022-08-17 NOTE — PROGRESS NOTES
Received notification from bedside RN about patient with regards to: discomfort in private area secondary to penile and scrotal swelling, requesting medication for relief  VS: /100, HR 88, RR 20, O2 sat 93% on RA    Intervention given: Fentanyl 50 mcg IV x 1 dose ordered    0137: Notified by RRT nurse about patient's increasing scrotal edema extending to bilateral lower extremity R>L, skin tight and patient increasingly uncomfortable complaining of 10/10 pain, with elevated BP  VS: /105, HR 88, RR 20, O2 sat 93%    - Bumex 2 g IV x 1 dose, 25% Albumin 25 g IV x 1 dose, Dilaudid 0.5 mg IV x 1 dose ordered

## 2022-08-17 NOTE — PROGRESS NOTES
Nephrology Progress Note  formerly [facility] / [address], [place] Ferraro, [address]  Phone - [phone]  Fax - [phone]                 Patient: Daly Wang                   YOB: 1980        Date- 8/17/2022                      Admit Date: 8/11/2022  CC: Follow up for FELECIA on CKD          IMPRESSION & PLAN:    FELECIA stage III on CKD stage IV(secondary to intravenous volume depletion)  CKD stage IV(baseline creatinine 2.8-3.5)(secondary to uncontrolled diabetes)  Anion gap metabolic acidosis sec to DKA  Non-anion gap metabolic acidosis  Diabetic ketoacidosis  Type 1 diabetes uncontrolled  Hyperphosphatemia  Elevated troponins  Community-acquired pneumonia  Acute hypoxic respiratory failure on mechanical ventilation    PLAN-  Remains hypervolemic. Change Bumex to 2 mg 3 times daily. Add albumin. Creatinine remains stable with normal electrolytes. No acute need for hemodialysis from volume laboratory standpoint. Patient continues to diurese well on Bumex. Subjective: Interval History:   -Patient was seen and examined today.  -He has not been hypoxic anymore.  -Complains of scrotal swelling      Objective:   Vitals:    08/16/22 2358 08/17/22 0058 08/17/22 0400 08/17/22 0824   BP: (!) 172/84 (!) 214/105 119/63 (!) 106/53   Pulse:    68   Resp:    18   Temp:    97.8 °F (36.6 °C)   SpO2:    95%   Weight:  91.4 kg (201 lb 8 oz)     Height:          08/16 0701 - 08/17 0700  In: -   Out: 3775 [[id number]]  Last 3 Recorded Weights in this Encounter    08/14/22 0500 08/15/22 0343 08/17/22 0058   Weight: 89.1 kg (196 lb 6.9 oz) 91 kg (200 lb 9.9 oz) 91.4 kg (201 lb 8 oz)      Physical exam:    GEN: In no apparent distress, anasarca noted  NECK- Supple, no mass  RESP: Basilar rales  CVS: S1,S2  RRR  NEURO: Normal speech, Non focal  EXT: 1-2+ edema  PSYCH: Normal Mood    Chart reviewed.          Pertinent Notes reviewed. Data Review :  Recent Labs     08/17/22  0106 08/16/22  0050 08/15/22  0316   * 136 138   K 4.1 4.3 4.0    106 106   CO2 22 19* 19*   BUN 61* 58* 61*   CREA 4.51* 4.66* 4.86*    120* 201*   CA 8.5 8.3* 8.4*   MG 2.3  --  2.3   PHOS 5.8* 5.7* 5.7*       Recent Labs     08/17/22  0106 08/16/22  0050 08/15/22  0316   WBC 7.1 8.3 6.3   HGB 9.4* 8.9* 10.4*   HCT 28.3* 26.9* 31.5*    298 270       No results for input(s): FE, TIBC, PSAT, FERR in the last 72 hours.    Medication list  reviewed  Current Facility-Administered Medications   Medication Dose Route Frequency    bumetanide (BUMEX) tablet 2 mg  2 mg Oral TID    oxymetazoline (AFRIN) 0.05 % nasal spray 2 Spray  2 Spray Both Nostrils BID PRN    insulin glargine (LANTUS) injection 20 Units  20 Units SubCUTAneous DAILY    carvediloL (COREG) tablet 25 mg  25 mg Oral BID WITH MEALS    levoFLOXacin (LEVAQUIN) tablet 750 mg  750 mg Oral Q48H    oxyCODONE IR (ROXICODONE) tablet 5 mg  5 mg Oral Q6H PRN    insulin lispro (HUMALOG) injection 7 Units  7 Units SubCUTAneous TIDAC    sodium chloride (OCEAN) 0.65 % nasal squeeze bottle 2 Spray  2 Spray Both Nostrils Q2H PRN    albuterol-ipratropium (DUO-NEB) 2.5 MG-0.5 MG/3 ML  3 mL Nebulization Q4H PRN    pantoprazole (PROTONIX) tablet 40 mg  40 mg Oral ACB    cloNIDine (CATAPRES) 0.1 mg/24 hr patch 1 Patch  1 Patch TransDERmal Q7D    heparin (porcine) injection 5,000 Units  5,000 Units SubCUTAneous Q8H    rosuvastatin (CRESTOR) tablet 10 mg  10 mg Oral QHS    aspirin delayed-release tablet 81 mg  81 mg Oral DAILY    labetaloL (NORMODYNE;TRANDATE) injection 20 mg  20 mg IntraVENous Q4H PRN    hydrALAZINE (APRESOLINE) 20 mg/mL injection 20 mg  20 mg IntraVENous Q6H PRN    sodium bicarbonate tablet 650 mg  650 mg Oral TID    glucose chewable tablet 16 g  4 Tablet Oral PRN    glucagon (GLUCAGEN) injection 1 mg  1 mg IntraMUSCular PRN    dextrose 10% infusion 0-250 mL  0-250 mL IntraVENous PRN amLODIPine (NORVASC) tablet 10 mg  10 mg Oral DAILY    insulin lispro (HUMALOG) injection   SubCUTAneous AC&HS    senna-docusate (PERICOLACE) 8.6-50 mg per tablet 1 Tablet  1 Tablet Oral DAILY    sodium chloride (NS) flush 5-40 mL  5-40 mL IntraVENous Q8H    sodium chloride (NS) flush 5-40 mL  5-40 mL IntraVENous PRN    acetaminophen (TYLENOL) tablet 650 mg  650 mg Oral Q6H PRN    Or    acetaminophen (TYLENOL) suppository 650 mg  650 mg Rectal Q6H PRN    polyethylene glycol (MIRALAX) packet 17 g  17 g Oral DAILY PRN    ondansetron (ZOFRAN) injection 4 mg  4 mg IntraVENous Q6H PRN    chlorhexidine (ORAL CARE KIT) 0.12 % mouthwash 15 mL  15 mL Oral Q12H    0.9% sodium chloride infusion 250 mL  250 mL IntraVENous PRN    balsam peru-castor oiL (VENELEX) ointment   Topical Q12H    alcohol 62% (NOZIN) nasal  1 Ampule  1 Ampule Topical Q12H          Lisa Peterson MD  8/17/2022

## 2022-08-17 NOTE — PROGRESS NOTES
Problem: Self Care Deficits Care Plan (Adult)  Goal: *Acute Goals and Plan of Care (Insert Text)  Description: FUNCTIONAL STATUS PRIOR TO ADMISSION: Patient was independent and active without use of DME. He recently received an order for a rollator but has not gotten one. His mother helps to hold his pump when he showers. He has not worked since last year and is in process of filing disability. HOME SUPPORT: The patient lived with mother and uncle and required assistance with showering and IADL tasks. Occupational Therapy Goals  Initiated 8/17/2022  1. Patient will perform grooming standing at sink with supervision/set-up within 7 day(s). 2.  Patient will perform lower body dressing with supervision/set-up within 7 day(s). 3.  Patient will perform toilet transfers with supervision/set-up within 7 day(s). 5.  Patient will perform all aspects of toileting with supervision/set-up within 7 day(s). 6.  Patient will participate in upper extremity therapeutic exercise/activities with supervision/set-up for 5 minutes within 7 day(s). 7.  Patient will utilize energy conservation techniques during functional activities with verbal cues within 7 day(s). Outcome: Progressing Towards Goal   OCCUPATIONAL THERAPY EVALUATION  Patient: Momo Moreno (85 y.o. male)  Date: 8/17/2022  Primary Diagnosis: DKA [facility]diabetic ketoacidosis) [facility] [E11.10]       Precautions: Fall       ASSESSMENT  Based on the objective data described below, the patient presents with impaired ability to complete ADL tasks dur to impaired functional mobility, activity tolerance, and edema with of scrotum, BLE and BUE. Received resting in recliner chair and agreeable to attempt stand and participate in oral care. Needing additional time for recline>sit>stand secondary to scrotal edema. Sit > stand with CGA with brief static stand, he deferred progression to bathroom secondary to pain from scrotal edema.  Anticipate he will have increase with indep with ADL tasks as edema is resolved and dc home with support from family. Current Level of Function Impacting Discharge (ADLs/self-care): oral care in sitting set up, sit <> stand with CGA    Functional Outcome Measure: The patient scored 40/100 on the Barthel Index outcome measure which is indicative of moderate impairment with ADL tasks. Other factors to consider for discharge: Patient limited with edema impacting his mobility, standing balance and functional reach to BLE. Patient will benefit from skilled therapy intervention to address the above noted impairments. PLAN :  Recommendations and Planned Interventions: self care training, functional mobility training, therapeutic exercise, therapeutic activities, endurance activities, patient education, home safety training, and family training/education    Frequency/Duration: Patient will be followed by occupational therapy 3 times a week to address goals. Recommendation for discharge: (in order for the patient to meet his/her long term goals)  No skilled occupational therapy/ follow up rehabilitation needs identified at this time.     This discharge recommendation:  Has not yet been discussed the attending provider and/or case management    IF patient discharges home will need the following DME: walker: rollator       SUBJECTIVE:   Patient stated I am .    OBJECTIVE DATA SUMMARY:   HISTORY:   Past Medical History:   Diagnosis Date    Bipolar 1 disorder, depressed ([address]     Bipolar disorder ([address]     Chronic kidney disease, stage 3a ([address]     Depression     Diabetes ([address]     DKA, type 1 ([address] 1/27/2013    diagnosed age 23    DKA, type 1 ([address]     H/O noncompliance with medical treatment, presenting hazards to health     MRSA (methicillin resistant staph aureus) culture positive     MRSA (methicillin resistant Staphylococcus aureus)     Face    Noncompliance with medication regimen     Second hand smoke exposure     Seizure ([address] Seizures ([facility] 2006 or 2007    one episode during group home     Past Surgical History:   Procedure Laterality Date    HX HEENT      top left wisdom tooth    HX ORTHOPAEDIC Left     wrist; MCV    UPPER GI ENDOSCOPY,BIOPSY  11/20/2018            Expanded or extensive additional review of patient history:     Home Situation  Home Environment: Private residence  # Steps to Enter: 6  Wheelchair Ramp: Yes  One/Two Story Residence: One story  Living Alone: No  Support Systems: Parent(s) (mom and uncle)  Patient Expects to be Discharged to[de-identified] Home  Current DME Used/Available at Home: None  Tub or Shower Type: Shower (family helps with holding his pump)    Hand dominance: Right    EXAMINATION OF PERFORMANCE DEFICITS:  Cognitive/Behavioral Status:  Neurologic State: Alert; Appropriate for age                     Vision/Perceptual:                      Diplopia: No    Acuity:  (baseline blurry- far worst than close)         Range of Motion:  AROM: Generally decreased, functional (BLE's)  PROM: Generally decreased, functional (BLE's)                      Strength:  Strength: Generally decreased, functional (BLE's)                Coordination:  Coordination: Within functional limits  Fine Motor Skills-Upper: Right Intact; Left Intact (additional time secondary to swelling)    Gross Motor Skills-Upper: Right Intact; Left Intact    Tone & Sensation:  Tone: Normal  Sensation: Impaired (BLE neuropathy)                      Balance:  Sitting: Intact  Standing: Impaired; With support  Standing - Static: Constant support;Good (RW)    Functional Mobility and Transfers for ADLs:  Bed Mobility:  Supine to Sit:  (received in chair and left in chair)  Scooting: Supervision    Transfers:  Sit to Stand: Stand-by assistance  Stand to Sit: Stand-by assistance    ADL Assessment:  Feeding: Independent    Oral Facial Hygiene/Grooming: Modified Independent (seated)    Bathing:  Moderate assistance    Upper Body Dressing: Setup    Lower Body Dressing: Moderate assistance    Toileting: Setup;Supervision (limited secondary to scrotal edema)                  ADL Intervention and task modifications:   Patient instructed and indicated understanding the benefits of maintaining activity tolerance, functional mobility, and independence with self care tasks during acute stay  to ensure safe return home and to baseline. Encouraged patient to increase frequency and duration OOB, be out of bed for all meals, perform daily ADLs (as approved by RN/MD regarding bathing etc), and performing functional mobility to/from bathroom. Grooming  Position Performed: Seated in chair  Brushing Teeth: Set-up                   Functional Measure:    Barthel Index:  Bathin  Bladder: 0  Bowels: 10  Groomin  Dressin  Feeding: 10  Mobility: 0  Stairs: 0  Toilet Use: 0  Transfer (Bed to Chair and Back): 10  Total: 40/100      The Barthel ADL Index: Guidelines  1. The index should be used as a record of what a patient does, not as a record of what a patient could do. 2. The main aim is to establish degree of independence from any help, physical or verbal, however minor and for whatever reason. 3. The need for supervision renders the patient not independent. 4. A patient's performance should be established using the best available evidence. Asking the patient, friends/relatives and nurses are the usual sources, but direct observation and common sense are also important. However direct testing is not needed. 5. Usually the patient's performance over the preceding 24-48 hours is important, but occasionally longer periods will be relevant. 6. Middle categories imply that the patient supplies over 50 per cent of the effort. 7. Use of aids to be independent is allowed. Score Interpretation (from [address] [address])    Independent   60-79 Minimally independent   40-59 Partially dependent   20-39 Very dependent   <20 Totally dependent     -Beltran Solis., Barthel, DWillamW. (1965). Functional evaluation: the Barthel Index. [facility] ([address] (1997). The Barthel activities of daily living index: self-reporting versus actual performance in the old (> or = 75 years). Journal of [address] [place] [address] 45(7), 14 [facility], LEFTY, Nasir Uriostegui., Maria Del Rosario Flynn. (1999). Measuring the change in disability after inpatient rehabilitation; comparison of the responsiveness of the Barthel Index and Functional [place] Measure. Journal of Neurology, Neurosurgery, and Psychiatry, 66(4), 534-301. KADEEM Saab, ANDERS Barnes, & Nataly Gomes M.A. (2004) Assessment of post-stroke quality of life in cost-effectiveness studies: The usefulness of the Barthel Index and the EuroQoL-5D. Quality of Life Research, 15, 341-95         Occupational Therapy Evaluation Charge Determination   History Examination Decision-Making   LOW Complexity : Brief history review  LOW Complexity : 1-3 performance deficits relating to physical, cognitive , or psychosocial skils that result in activity limitations and / or participation restrictions  LOW Complexity : No comorbidities that affect functional and no verbal or physical assistance needed to complete eval tasks       Based on the above components, the patient evaluation is determined to be of the following complexity level: LOW   Pain Rating:  Secondary to scrotal swelling    Activity Tolerance:   Fair    After treatment patient left in no apparent distress:    Sitting in chair and Call bell within reach    COMMUNICATION/EDUCATION:   The patients plan of care was discussed with: Physical therapist and Registered nurse. Home safety education was provided and the patient/caregiver indicated understanding. and Patient/family agree to work toward stated goals and plan of care. This patients plan of care is appropriate for delegation to [facility].     Thank you for this referral.  Maurice Delgado OT  Time Calculation: 21 mins

## 2022-08-17 NOTE — PROGRESS NOTES
Problem: Mobility Impaired (Adult and Pediatric)  Goal: *Acute Goals and Plan of Care (Insert Text)  Description: FUNCTIONAL STATUS PRIOR TO ADMISSION: Patient was independent without use of DME. Pt reports \"trying to get on disability. \"  Pt does not work currently. HOME SUPPORT PRIOR TO ADMISSION: The patient lived with family (Mom and Uncle) and required assistance for showering (\"to hold my pump\") but otherwise mod. I, no device use. Physical Therapy Goals  Initiated 8/17/2022  1. Patient will move from supine to sit and sit to supine , scoot up and down, and roll side to side in bed with modified independence within 7 day(s). 2.  Patient will transfer from bed to chair and chair to bed with modified independence using the least restrictive device within 7 day(s). 3.  Patient will perform sit to stand with modified independence within 7 day(s). 4.  Patient will ambulate with modified independence for 100 feet with the least restrictive device within 7 day(s). Outcome: Progressing Towards Goal     PHYSICAL THERAPY EVALUATION  Patient: Emily Hopper (89 y.o. male)  Date: 8/17/2022  Primary Diagnosis: DKA (diabetic ketoacidosis) ([facility] [E11.10]       Precautions: Fall         ASSESSMENT  Based on the objective data described below, the patient presents with decreased functional mobility secondary to medical illness, scrotal edema limiting mobility due to pain. He is willing to stand at bedside chair today with RW for support but declines gait efforts. Anticipate progression of mobility once edema is decreased and continued family support at discharge with pt already having a script for rollator. Continue to follow. Current Level of Function Impacting Discharge (mobility/balance): transfers SBA ; gait deferred due to scrotal edema    Functional Outcome Measure:   The patient scored Total: 40/100 on the Barthel Index outcome measure which is indicative of being partially dependent in basic self-care. Other factors to consider for discharge: pt has supportive family in home     Patient will benefit from skilled therapy intervention to address the above noted impairments. PLAN :  Recommendations and Planned Interventions: bed mobility training, transfer training, gait training, patient and family training/education, and therapeutic activities      Frequency/Duration: Patient will be followed by physical therapy:  4 times a week to address goals. Recommendation for discharge: (in order for the patient to meet his/her long term goals)  Physical therapy at least 2 days/week in the home AND ensure assist and/or supervision for safety with mobility    This discharge recommendation:  Has been made in collaboration with the attending provider and/or case management    IF patient discharges home will need the following DME: rollator and pt reports he has a script for one already         SUBJECTIVE:   Patient stated I just can't do much because of my scrotum being so swollen.     OBJECTIVE DATA SUMMARY:   HISTORY:    Past Medical History:   Diagnosis Date    Bipolar 1 disorder, depressed ([address]     Bipolar disorder ([address]     Chronic kidney disease, stage 3a ([address]     Depression     Diabetes ([address]     DKA, type 1 ([address] 1/27/2013    diagnosed age 23    DKA, type 1 ([address]     H/O noncompliance with medical treatment, presenting hazards to health     MRSA (methicillin resistant staph aureus) culture positive     MRSA (methicillin resistant Staphylococcus aureus)     Face    Noncompliance with medication regimen     Second hand smoke exposure     Seizure ([address]     Seizures ([address] 2006 or 2007    one episode during intermediate     Past Surgical History:   Procedure Laterality Date    HX HEENT      top left wisdom tooth    HX ORTHOPAEDIC Left     wrist; MCV    UPPER GI ENDOSCOPY,BIOPSY  11/20/2018            Personal factors and/or comorbidities impacting plan of care:     Home Situation  Home Environment: Private residence  # Steps to Enter: 6  Wheelchair Ramp: Yes  One/Two Story Residence: One story  Living Alone: No  Support Systems: Parent(s) (mom and uncle)  Patient Expects to be Discharged to[de-identified] Home  Current DME Used/Available at Home: None  Tub or Shower Type: Shower (family helps with holding his pump)    EXAMINATION/PRESENTATION/DECISION MAKING:   Critical Behavior:  Neurologic State: Alert, Appropriate for age  Orientation Level: Oriented X4  Cognition: Appropriate decision making, Appropriate for age attention/concentration, Appropriate safety awareness       Skin:  dry, scaly, scabs on BLE's  Edema: BLE, B hands  Range Of Motion:  AROM: Generally decreased, functional (BLE's)           PROM: Generally decreased, functional (BLE's)           Strength:    Strength: Generally decreased, functional (BLE's)                    Tone & Sensation:   Tone: Normal              Sensation: Impaired (BLE neuropathy)               Coordination:  Coordination: Within functional limits  Vision:   Diplopia: No  Acuity:  (baseline blurry- far worst than close)  Functional Mobility:  Bed Mobility:     Supine to Sit:  (received in chair and left in chair)     Scooting: Supervision  Transfers:  Sit to Stand: Stand-by assistance  Stand to Sit: Stand-by assistance                       Balance:   Sitting: Intact  Standing: Impaired; With support  Standing - Static: Constant support;Good (RW)  Ambulation/Gait Training:              Gait Description (WDL):  (pt declines due to scrotal edema)            Functional Measure:  Barthel Index:    Bathin  Bladder: 0  Bowels: 10  Groomin  Dressin  Feeding: 10  Mobility: 0  Stairs: 0  Toilet Use: 0  Transfer (Bed to Chair and Back): 10  Total: 40/100       The Barthel ADL Index: Guidelines  1. The index should be used as a record of what a patient does, not as a record of what a patient could do.   2. The main aim is to establish degree of independence from any help, physical or verbal, however minor and for whatever reason. 3. The need for supervision renders the patient not independent. 4. A patient's performance should be established using the best available evidence. Asking the patient, friends/relatives and nurses are the usual sources, but direct observation and common sense are also important. However direct testing is not needed. 5. Usually the patient's performance over the preceding 24-48 hours is important, but occasionally longer periods will be relevant. 6. Middle categories imply that the patient supplies over 50 per cent of the effort. 7. Use of aids to be independent is allowed. Score Interpretation (from [facility])    Independent   60-79 Minimally independent   40-59 Partially dependent   20-39 Very dependent   <20 Totally dependent     -Beltran Solis., Barthel, DWillamW. (1965). Functional evaluation: the Barthel Index. [facility] ([address] (1997). The Barthel activities of daily living index: self-reporting versus actual performance in the old (> or = 75 years). Journal [address] 45(7), 14 [facility], .WillamWillam, [place] Rafael., May Moises. (1999). Measuring the change in disability after inpatient rehabilitation; comparison of the responsiveness of the Barthel Index and Functional [place] Measure. Journal of Neurology, Neurosurgery, and Psychiatry, 66(4), 473-177. KADEEM David, ANDERS Barnes, & Sydni Dewitt MJETT. (2004) Assessment of post-stroke quality of life in cost-effectiveness studies: The usefulness of the Barthel Index and the EuroQoL-5D.  Quality of Life Research, 15, 155-88        Physical Therapy Evaluation Charge Determination   History Examination Presentation Decision-Making   MEDIUM  Complexity : 1-2 comorbidities / personal factors will impact the outcome/ POC  MEDIUM Complexity : 3 Standardized tests and measures addressing body structure, function, activity limitation and / or participation in recreation  MEDIUM Complexity : Evolving with changing characteristics  Other outcome measures Tinetti  MEDIUM      Based on the above components, the patient evaluation is determined to be of the following complexity level: MEDIUM    Pain Rating:  C/o 10/10 scrotal pain    Activity Tolerance:   Fair and limited by scrotal edema    After treatment patient left in no apparent distress:   Sitting in chair, Call bell within reach, and nurse notified     COMMUNICATION/EDUCATION:   The patients plan of care was discussed with: Occupational therapist and Registered nurse. Fall prevention education was provided and the patient/caregiver indicated understanding., Patient/family have participated as able in goal setting and plan of care. , and Patient/family agree to work toward stated goals and plan of care.     Thank you for this referral.  Pavel Yeager, PT, DPT   Time Calculation: 23 mins

## 2022-08-17 NOTE — PROGRESS NOTES
Transition of Care Plan:     RUR: 35 (high)  Disposition: Home with HH? and follow ups. Follow up appointments: PCP, Nephrology  DME needed: TBD, patient said he needs rollator. Transportation at Discharge: Mother to transport. Keys or means to access home:  Patient has keys. IM Medicare Letter: N/A  Is patient a  and connected with the [address]? N/A        If yes, was [employer] transfer form completed and [facility] notified? N/A  Caregiver Contact: Wilma Chandler - mother - [phone]  Discharge Caregiver contacted prior to discharge? Patient to contact mother. Care Conference needed?:  No.    Patient pending PT/OT recs, says that he has had multiple falls and injuries, feels that he needs some type of therapy or rehab if healthcare team feels it is necessary. Private caregiver list provided to patient. Patient would like [facility] referral sent to [facility] and rollator to be ordered. At Home declined, referral sent to [place] [facility] [employer] and [facility] [place] [facility]. [place] [facility] [employer] declined rollator, referral sent to [facility]. [facility] declined.           BRET Hairston, RN    Care Management  [phone]

## 2022-08-17 NOTE — DIABETES MGMT
[facility]    CLINICAL NURSE SPECIALIST CONSULT     Initial Presentation   Kaley Grijalva is a 46 y.o. male admitted 8/11/2022 after experiencing nausea, vomiting and AMS. Past hx of DKA. EMS reports patient was satting in the 76s, with hypertension on their arrival.  Patient was seen in emergency department several days ago with complaints of bilateral lower extremity edema and referred to nephrology for further management of his acute on chronic renal insufficiency. Low temp, low HR & low BP. O2 sats 98%  ER exam:   Cardiovascular:     Rate and Rhythm: Regular rhythm. Bradycardia present. Heart sounds: Normal heart sounds. No murmur heard. No friction rub. Pulmonary:     Effort: Bradypnea and prolonged expiration present. No respiratory distress. Breath sounds: No stridor. No wheezing or rales. Abdominal:     General: Bowel sounds are normal. There is no distension. Palpations: Abdomen is soft. Tenderness: There is no abdominal tenderness. There is no rebound. Musculoskeletal:         General: No tenderness. Normal range of motion. Cervical back: Normal range of motion and neck supple. Right lower leg: Edema present. Left lower leg: Edema present. Comments: Moving all extremities independently   LAB: WBC 12.6. Glucose 812. Creatine 5.93. GFR 11. Anion Gap 25. A1c 8.7  CXR:   Interval development of bilateral hazy lung opacities concerning for edema or   infection.      HX:   Past Medical History:   Diagnosis Date    Bipolar 1 disorder, depressed ([facility] [facility]     Bipolar disorder ([facility] [address]     Chronic kidney disease, stage 3a ([address]     Depression     Diabetes ([facility] [address]     DKA, type 1 ([facility] [address] 1/27/2013    diagnosed age 23    DKA, type 1 ([address]     H/O noncompliance with medical treatment, presenting hazards to health     MRSA (methicillin resistant staph aureus) culture positive     MRSA (methicillin resistant Staphylococcus aureus)     Face    Noncompliance with medication regimen     Second hand smoke exposure     Seizure ([facility] [address]     Seizures ([facility] [address] 2006 or 2007    one episode during [facility]      INITIAL DX:   DKA (diabetic ketoacidosis) ([facility] [address] [E11.10]     Current Treatment     TX: ASA. Levaquin. Protonix. Insulin. BP management    Consulted by Provider for advanced diabetes nursing assessment and care for:   [x] Transitioning off [place]   [x] Inpatient management strategy  [] Home management assessment  [] Survival skill education    Hospital Course   Clinical progress has been complicated by DKA. 08/15 Patient off Glucostabilizer, vasopressors and extubated over the weekend; has been on 16 units Lantus daily in the last 3 days. Diabetes History   The patient reports a 20 year history of Type 1 diabetes. He has a positive family hx of diabetes (mom & niece). He states today that he sees Dr. Geovanny Duarte (endocrinologist) for his diabetes. He was recently started on a Tandem insulin pump on 07/15/22. Unsure of which CGM is in use. Diabetes-related Medical History  Acute complications  DKA  Neurological complications  Peripheral neuropathy  Microvascular disease  Retinopathy    Diabetes Medication History  Key Antihyperglycemic Medications               insulin aspart U-100 (NovoLOG U-100 Insulin aspart) 100 unit/mL injection Use as instructed via insulin pump. Dx code E10.65 max daily dose 50U    insulin glargine (LANTUS) 100 unit/mL injection 16 Units by SubCUTAneous route daily. insulin aspart U-100 (NovoLOG Flexpen U-100 Insulin) 100 unit/mL (3 mL) inpn Take 1 unit for every 15 grams of carbohydrates, 1 unit for every 50 points >150. Max daily dose 25U. Overall evaluation:    [x] Achieving A1c target with drug therapy & self-care practices    Subjective   \" I had pnemonia\"     Objective   Physical exam  General Normal weight male. Conversant and cooperative, Sitting up on side of bed.   Neuro  Alert, oriented   Vital Signs Visit Vitals  BP (!) 177/94 (BP 1 Location: Right lower arm, BP Patient Position: Reclining)   Pulse 94   Temp 97.8 °F (36.6 °C)   Resp 18   Ht 5' 9\" (1.753 m)   Wt 91.4 kg (201 lb 8 oz)   SpO2 95%   BMI 29.76 kg/m²     Laboratory  Recent Labs     08/17/22  0106 08/16/22  0050 08/15/22  0316    120* 201*   AGAP 10 11 13   WBC 7.1 8.3 6.3   CREA 4.51* 4.66* 4.86*   GFRNA 15* 14* 13*   AST 18  --   --    ALT 22  --   --      Factors impacting BG management  Factor Dose Comments   Nutrition:  Standard meals   45 gram/meal      Pain Tylenol prn    Infection   Levofloxacin 750 mg Q 48 hours     Other:   Kidney function  Liver enzymes   CKD   Normal      Blood glucose pattern      Significant diabetes-related events over the past 24-72 hours    Assessment and Plan   Nursing Diagnosis Risk for unstable blood glucose pattern   Nursing Intervention Domain 525 Decision-making Support   Nursing Interventions Examined current inpatient diabetes/blood glucose control   Explored factors facilitating and impeding inpatient management  Explored corrective strategies with patient and responsible inpatient provider   Informed patient of rational for insulin strategy while hospitalized     Evaluation   This 46year old patient with Type 1 diabetes did not achieve BG control prior to admission as evidenced by an A1c of 8.7%. Patient's A1c was much higher just one month ago prior to start of insulin pump therapy. Of concern, the patient had an admission glucose of 812. The patient was in DKA and was started on Glucostabilzer. He is now alert and off ventilator. In our discussion, he admits he did have some N/V prior to him \"falling out\". He has been on Lantus 16 units daily for the last 3 days, but his basal rate is the equivalent of 26 units per day. Sis Ayala He has received 22 units of correctional insulin in the last 24 hours. PTA, he was on a Tandem pump which was started 07/15/22 with a basal rate equated to 26 units daily. He is now eating and will likely require mealtime insulin coverage along with his basal dose to get him closer to his home dose. Since the last hospitalization, he states he has been using a CGM for blood glucose monitoring. He states it is a Dexcom system, but unsure if this is accurate. He states he has an upcoming appointment with Dr. Sky Velasquez. Some info copied from [address] [facility] note. 8/16/22 The patient's BG went to 73 yesterday afternoon on 16 units Lantus. His BG was 197 this morning. The patient may need an increase in basal dose, however from past hospitalizations, it is noted that the patient's BG's are labile. Therefore, a slow increase is recommended. Consider 22 units Lantus daily. Also consider decreasing meal time to 4 units Humalog with each meal.   8/17/22 The patient had a BG of 67 yesterday evening on 32 units Lantus. His dose was reduced to 20 units Lantus today. I agree with strategy. However, consider reducing meal time insulin dose to 4 units Humalog. The patient reports the desire to resume his insulin pump at discharge. I would like ensure the pump is on and working. The patient does not have his insulin pump  or supplies at this time. He reportedly will have a family member bring it. I will check back tomorrow.      Recommendations   Continue 20 units Lantus daily  Consider reducing Humalog insulin dose to 4 units Humalog with each meal.       Billing Code(s)   [] 28249 IP subsequent hospital care - 35 minutes [] 46142 Prolonged Services - 65 minutes [] 74220 Prolonged Services - 110 minutes  [] 58910 IP subsequent hospital care - 25 minutes [] 52040 Prolonged Services - 55 minutes [] 80791 Prolonged Services - 100 minutes  [x] 82717 IP subsequent hospital care - 15 minutes [] 19374 Prolonged Services - 45 minutes [] 47618 Prolonged Services - 90 minutes    Before making these care recommendations, I personally reviewed the hospitalization record, including notes, laboratory & diagnostic data and current medications, and examined the patient at the bedside (circumstances permitting) before making care recommendations. More than fifty (50) percent of the time was spent in patient counseling and/or care coordination.   Total minutes: 15 minutes    ANEESH Butts  Diabetes Clinical Nurse Specialist  [facility]  Access via [employer]

## 2022-08-17 NOTE — PROGRESS NOTES
This nurse in to assess patient at this time and recognized a shift in patient status. This nurse notified MD about patient changes, discomfort and needs expressed. MD entered orders at this time . This nurse will continue to monitor patient status for further changes.

## 2022-08-18 LAB
GLUCOSE BLD STRIP.AUTO-MCNC: 100 MG/DL (ref 65–117)
GLUCOSE BLD STRIP.AUTO-MCNC: 69 MG/DL (ref 65–117)
GLUCOSE BLD STRIP.AUTO-MCNC: 74 MG/DL (ref 65–117)
GLUCOSE BLD STRIP.AUTO-MCNC: 81 MG/DL (ref 65–117)
GLUCOSE BLD STRIP.AUTO-MCNC: 92 MG/DL (ref 65–117)
SERVICE CMNT-IMP: NORMAL

## 2022-08-18 PROCEDURE — 74011000250 HC RX REV CODE- 250: Performed by: INTERNAL MEDICINE

## 2022-08-18 PROCEDURE — 74011250637 HC RX REV CODE- 250/637: Performed by: INTERNAL MEDICINE

## 2022-08-18 PROCEDURE — 94760 N-INVAS EAR/PLS OXIMETRY 1: CPT

## 2022-08-18 PROCEDURE — 99231 SBSQ HOSP IP/OBS SF/LOW 25: CPT

## 2022-08-18 PROCEDURE — 65270000029 HC RM PRIVATE

## 2022-08-18 PROCEDURE — 74011000250 HC RX REV CODE- 250: Performed by: NURSE PRACTITIONER

## 2022-08-18 PROCEDURE — 74011250637 HC RX REV CODE- 250/637: Performed by: STUDENT IN AN ORGANIZED HEALTH CARE EDUCATION/TRAINING PROGRAM

## 2022-08-18 PROCEDURE — 74011250636 HC RX REV CODE- 250/636: Performed by: NURSE PRACTITIONER

## 2022-08-18 PROCEDURE — 82962 GLUCOSE BLOOD TEST: CPT

## 2022-08-18 PROCEDURE — 74011250637 HC RX REV CODE- 250/637: Performed by: NURSE PRACTITIONER

## 2022-08-18 PROCEDURE — 74011250636 HC RX REV CODE- 250/636: Performed by: INTERNAL MEDICINE

## 2022-08-18 PROCEDURE — P9047 ALBUMIN (HUMAN), 25%, 50ML: HCPCS | Performed by: INTERNAL MEDICINE

## 2022-08-18 RX ORDER — HYDROMORPHONE HYDROCHLORIDE 1 MG/ML
0.5 INJECTION, SOLUTION INTRAMUSCULAR; INTRAVENOUS; SUBCUTANEOUS ONCE
Status: COMPLETED | OUTPATIENT
Start: 2022-08-18 | End: 2022-08-18

## 2022-08-18 RX ORDER — ALBUMIN HUMAN 250 G/1000ML
25 SOLUTION INTRAVENOUS ONCE
Status: COMPLETED | OUTPATIENT
Start: 2022-08-18 | End: 2022-08-18

## 2022-08-18 RX ORDER — HYDRALAZINE HYDROCHLORIDE 50 MG/1
50 TABLET, FILM COATED ORAL 3 TIMES DAILY
Status: DISCONTINUED | OUTPATIENT
Start: 2022-08-18 | End: 2022-08-18

## 2022-08-18 RX ORDER — HYDRALAZINE HYDROCHLORIDE 50 MG/1
100 TABLET, FILM COATED ORAL 3 TIMES DAILY
Status: DISCONTINUED | OUTPATIENT
Start: 2022-08-18 | End: 2022-08-20 | Stop reason: HOSPADM

## 2022-08-18 RX ORDER — LIDOCAINE HYDROCHLORIDE 20 MG/ML
JELLY TOPICAL ONCE
Status: COMPLETED | OUTPATIENT
Start: 2022-08-18 | End: 2022-08-18

## 2022-08-18 RX ORDER — AMLODIPINE BESYLATE 5 MG/1
5 TABLET ORAL DAILY
Status: DISCONTINUED | OUTPATIENT
Start: 2022-08-19 | End: 2022-08-20 | Stop reason: HOSPADM

## 2022-08-18 RX ORDER — BUMETANIDE 0.25 MG/ML
4 INJECTION INTRAMUSCULAR; INTRAVENOUS 2 TIMES DAILY
Status: DISCONTINUED | OUTPATIENT
Start: 2022-08-18 | End: 2022-08-20 | Stop reason: HOSPADM

## 2022-08-18 RX ORDER — CLONIDINE 0.2 MG/24H
1 PATCH, EXTENDED RELEASE TRANSDERMAL
Status: DISCONTINUED | OUTPATIENT
Start: 2022-08-18 | End: 2022-08-20 | Stop reason: HOSPADM

## 2022-08-18 RX ADMIN — LIDOCAINE HYDROCHLORIDE: 20 JELLY TOPICAL at 03:02

## 2022-08-18 RX ADMIN — SODIUM BICARBONATE 650 MG: 650 TABLET ORAL at 21:39

## 2022-08-18 RX ADMIN — BUMETANIDE 4 MG: 0.25 INJECTION INTRAMUSCULAR; INTRAVENOUS at 17:48

## 2022-08-18 RX ADMIN — SODIUM CHLORIDE, PRESERVATIVE FREE 10 ML: 5 INJECTION INTRAVENOUS at 13:46

## 2022-08-18 RX ADMIN — MORPHINE SULFATE 1 MG: 2 INJECTION, SOLUTION INTRAMUSCULAR; INTRAVENOUS at 21:39

## 2022-08-18 RX ADMIN — HYDROMORPHONE HYDROCHLORIDE 0.5 MG: 1 INJECTION, SOLUTION INTRAMUSCULAR; INTRAVENOUS; SUBCUTANEOUS at 03:02

## 2022-08-18 RX ADMIN — MORPHINE SULFATE 1 MG: 2 INJECTION, SOLUTION INTRAMUSCULAR; INTRAVENOUS at 16:02

## 2022-08-18 RX ADMIN — LABETALOL HYDROCHLORIDE 20 MG: 5 INJECTION INTRAVENOUS at 06:45

## 2022-08-18 RX ADMIN — SODIUM BICARBONATE 650 MG: 650 TABLET ORAL at 16:04

## 2022-08-18 RX ADMIN — OXYCODONE 5 MG: 5 TABLET ORAL at 06:28

## 2022-08-18 RX ADMIN — ASPIRIN 81 MG: 81 TABLET, COATED ORAL at 09:16

## 2022-08-18 RX ADMIN — OXYCODONE 5 MG: 5 TABLET ORAL at 18:54

## 2022-08-18 RX ADMIN — CASTOR OIL AND BALSAM, PERU: 788; 87 OINTMENT TOPICAL at 09:00

## 2022-08-18 RX ADMIN — CARVEDILOL 25 MG: 12.5 TABLET, FILM COATED ORAL at 09:17

## 2022-08-18 RX ADMIN — ALBUMIN (HUMAN) 25 G: 0.25 INJECTION, SOLUTION INTRAVENOUS at 17:47

## 2022-08-18 RX ADMIN — MORPHINE SULFATE 1 MG: 2 INJECTION, SOLUTION INTRAMUSCULAR; INTRAVENOUS at 11:03

## 2022-08-18 RX ADMIN — SENNOSIDES AND DOCUSATE SODIUM 1 TABLET: 50; 8.6 TABLET ORAL at 09:16

## 2022-08-18 RX ADMIN — HEPARIN SODIUM 5000 UNITS: 5000 INJECTION INTRAVENOUS; SUBCUTANEOUS at 02:05

## 2022-08-18 RX ADMIN — AMLODIPINE BESYLATE 10 MG: 5 TABLET ORAL at 09:16

## 2022-08-18 RX ADMIN — SODIUM CHLORIDE, PRESERVATIVE FREE 10 ML: 5 INJECTION INTRAVENOUS at 06:28

## 2022-08-18 RX ADMIN — HEPARIN SODIUM 5000 UNITS: 5000 INJECTION INTRAVENOUS; SUBCUTANEOUS at 09:27

## 2022-08-18 RX ADMIN — SODIUM CHLORIDE, PRESERVATIVE FREE 10 ML: 5 INJECTION INTRAVENOUS at 21:41

## 2022-08-18 RX ADMIN — LABETALOL HYDROCHLORIDE 20 MG: 5 INJECTION INTRAVENOUS at 16:05

## 2022-08-18 RX ADMIN — CARVEDILOL 25 MG: 12.5 TABLET, FILM COATED ORAL at 17:48

## 2022-08-18 RX ADMIN — PANTOPRAZOLE SODIUM 40 MG: 40 TABLET, DELAYED RELEASE ORAL at 09:16

## 2022-08-18 RX ADMIN — ROSUVASTATIN CALCIUM 10 MG: 10 TABLET, FILM COATED ORAL at 21:39

## 2022-08-18 RX ADMIN — CASTOR OIL AND BALSAM, PERU: 788; 87 OINTMENT TOPICAL at 21:43

## 2022-08-18 RX ADMIN — HYDRALAZINE HYDROCHLORIDE 100 MG: 50 TABLET, FILM COATED ORAL at 16:04

## 2022-08-18 RX ADMIN — ALBUMIN (HUMAN) 25 G: 0.25 INJECTION, SOLUTION INTRAVENOUS at 06:28

## 2022-08-18 RX ADMIN — HYDRALAZINE HYDROCHLORIDE 100 MG: 50 TABLET, FILM COATED ORAL at 21:39

## 2022-08-18 RX ADMIN — SODIUM BICARBONATE 650 MG: 650 TABLET ORAL at 09:16

## 2022-08-18 RX ADMIN — ACETAMINOPHEN 650 MG: 325 TABLET ORAL at 21:39

## 2022-08-18 RX ADMIN — MORPHINE SULFATE 1 MG: 2 INJECTION, SOLUTION INTRAMUSCULAR; INTRAVENOUS at 02:05

## 2022-08-18 RX ADMIN — BUMETANIDE 2 MG: 1 TABLET ORAL at 09:16

## 2022-08-18 RX ADMIN — OXYCODONE 5 MG: 5 TABLET ORAL at 12:02

## 2022-08-18 NOTE — PROGRESS NOTES
Nephrology Progress Note  [facility] / [address] [facility] [address]  [place], [address]  Phone - [phone]  Fax - [phone]                 Patient: Sanju Sal                   YOB: 1980        Date- 8/18/2022                      Admit Date: 8/11/2022  CC: Follow up for felecia         IMPRESSION & PLAN:   FELECIA on CKD stage IV  CKD stage IV(baseline creatinine 2.8-3.5)(secondary to uncontrolled diabetes)  edema  Non-anion gap metabolic acidosis  Diabetic ketoacidosis  Type 1 diabetes uncontrolled  Hyperphosphatemia  Elevated troponins  Community-acquired pneumonia  Acute hypoxic respiratory failure on mechanical ventilation    PLAN-  Continue bumex - iv  Give albumin today. NO RRT indicated  Keep hansen in place  Lower norvasc dose  Increase hydralazine dose. Urology consult  Continue norvasc, clonidine  Avoid acei or arb  Continue sod. bicarb     Subjective: Interval History:   -Patient was seen and examined today. He had hansen placed overnight  No c/o sob  C/o leg edema      Objective:   Vitals:    08/17/22 2014 08/17/22 2216 08/18/22 0632 08/18/22 0833   BP: (!) 203/85 (!) 199/85 (!) 180/87 (!) 179/89   Pulse: 85  91 89   Resp: 18  20 18   Temp: 98.2 °F (36.8 °C)  97.6 °F (36.4 °C) 98 °F (36.7 °C)   SpO2: 94%  93% 90%   Weight:       Height:          08/17 0701 - 08/18 0700  In: 610 [P.O.:600]  Out: 4006 [Urine:3536]  Last 3 Recorded Weights in this Encounter    08/14/22 0500 08/15/22 0343 08/17/22 0058   Weight: 89.1 kg (196 lb 6.9 oz) 91 kg (200 lb 9.9 oz) 91.4 kg (201 lb 8 oz)      Physical exam:    GEN:  NAD  NECK:  Supple, no thyromegaly  RESP: Clear  b/l, no  wheezing,   CVS: RRR,S1,S2   NEURO: non focal, normal speech  EXT: Edema +nt     Hansen +  Scrotal edema +    Chart reviewed. Pertinent Notes reviewed.      Data Review :  Recent Labs     08/17/22  0106 08/16/22  0050   * 136   K 4.1 4.3    106   CO2 22 19*   BUN 61* 58*   CREA 4.51* 4.66*    120*   CA 8.5 8.3*   MG 2.3  --    PHOS 5.8* 5.7*       Recent Labs     08/17/22  0106 08/16/22  0050   WBC 7.1 8.3   HGB 9.4* 8.9*   HCT 28.3* 26.9*    298       No results for input(s): FE, TIBC, PSAT, FERR in the last 72 hours.    Medication list  reviewed  Current Facility-Administered Medications   Medication Dose Route Frequency    hydrALAZINE (APRESOLINE) tablet 50 mg  50 mg Oral TID    cloNIDine (CATAPRES) 0.2 mg/24 hr patch 1 Patch  1 Patch TransDERmal Q7D    bumetanide (BUMEX) tablet 2 mg  2 mg Oral TID    albumin human 25% (BUMINATE) solution 25 g  25 g IntraVENous Q6H    morphine injection 1 mg  1 mg IntraVENous Q4H PRN    oxymetazoline (AFRIN) 0.05 % nasal spray 2 Spray  2 Spray Both Nostrils BID PRN    carvediloL (COREG) tablet 25 mg  25 mg Oral BID WITH MEALS    oxyCODONE IR (ROXICODONE) tablet 5 mg  5 mg Oral Q6H PRN    sodium chloride (OCEAN) 0.65 % nasal squeeze bottle 2 Spray  2 Spray Both Nostrils Q2H PRN    albuterol-ipratropium (DUO-NEB) 2.5 MG-0.5 MG/3 ML  3 mL Nebulization Q4H PRN    pantoprazole (PROTONIX) tablet 40 mg  40 mg Oral ACB    [Held by provider] heparin (porcine) injection 5,000 Units  5,000 Units SubCUTAneous Q8H    rosuvastatin (CRESTOR) tablet 10 mg  10 mg Oral QHS    aspirin delayed-release tablet 81 mg  81 mg Oral DAILY    labetaloL (NORMODYNE;TRANDATE) injection 20 mg  20 mg IntraVENous Q4H PRN    hydrALAZINE (APRESOLINE) 20 mg/mL injection 20 mg  20 mg IntraVENous Q6H PRN    sodium bicarbonate tablet 650 mg  650 mg Oral TID    glucose chewable tablet 16 g  4 Tablet Oral PRN    glucagon (GLUCAGEN) injection 1 mg  1 mg IntraMUSCular PRN    dextrose 10% infusion 0-250 mL  0-250 mL IntraVENous PRN    amLODIPine (NORVASC) tablet 10 mg  10 mg Oral DAILY    insulin lispro (HUMALOG) injection   SubCUTAneous AC&HS    senna-docusate (PERICOLACE) 8.6-50 mg per tablet 1 Tablet  1 Tablet Oral DAILY    sodium chloride (NS) flush 5-40 mL  5-40 mL IntraVENous Q8H    sodium chloride (NS) flush 5-40 mL  5-40 mL IntraVENous PRN    acetaminophen (TYLENOL) tablet 650 mg  650 mg Oral Q6H PRN    Or    acetaminophen (TYLENOL) suppository 650 mg  650 mg Rectal Q6H PRN    polyethylene glycol (MIRALAX) packet 17 g  17 g Oral DAILY PRN    ondansetron (ZOFRAN) injection 4 mg  4 mg IntraVENous Q6H PRN    chlorhexidine (ORAL CARE KIT) 0.12 % mouthwash 15 mL  15 mL Oral Q12H    0.9% sodium chloride infusion 250 mL  250 mL IntraVENous PRN    balsam peru-castor oiL (VENELEX) ointment   Topical Q12H    alcohol 62% (NOZIN) nasal  1 Ampule  1 Ampule Topical Q12H          Zonia Pettit MD  8/18/2022

## 2022-08-18 NOTE — DIABETES MGMT
[address] [facility]    CLINICAL NURSE SPECIALIST CONSULT     Initial Presentation   Sally Krishnan is a 46 y.o. male admitted 8/11/2022 after experiencing nausea, vomiting and AMS. Past hx of DKA. EMS reports patient was satting in the 76s, with hypertension on their arrival.  Patient was seen in emergency department several days ago with complaints of bilateral lower extremity edema and referred to nephrology for further management of his acute on chronic renal insufficiency. Low temp, low HR & low BP. O2 sats 98%  ER exam:   Cardiovascular:     Rate and Rhythm: Regular rhythm. Bradycardia present. Heart sounds: Normal heart sounds. No murmur heard. No friction rub. Pulmonary:     Effort: Bradypnea and prolonged expiration present. No respiratory distress. Breath sounds: No stridor. No wheezing or rales. Abdominal:     General: Bowel sounds are normal. There is no distension. Palpations: Abdomen is soft. Tenderness: There is no abdominal tenderness. There is no rebound. Musculoskeletal:         General: No tenderness. Normal range of motion. Cervical back: Normal range of motion and neck supple. Right lower leg: Edema present. Left lower leg: Edema present. Comments: Moving all extremities independently   LAB: WBC 12.6. Glucose 812. Creatine 5.93. GFR 11. Anion Gap 25. A1c 8.7  CXR:   Interval development of bilateral hazy lung opacities concerning for edema or   infection.      HX:   Past Medical History:   Diagnosis Date    Bipolar 1 disorder, depressed ([facility] [address]     Bipolar disorder ([facility] [address]     Chronic kidney disease, stage 3a ([address]     Depression     Diabetes ([address]     DKA, type 1 ([facility] [address] 1/27/2013    diagnosed age 23    DKA, type 1 ([address]     H/O noncompliance with medical treatment, presenting hazards to health     MRSA (methicillin resistant staph aureus) culture positive     MRSA (methicillin resistant Staphylococcus aureus)     Face    Noncompliance with medication regimen     Second hand smoke exposure     Seizure ([facility] [address]     Seizures ([facility] [address] 2006 or 2007    one episode during senior care      INITIAL DX:   DKA (diabetic ketoacidosis) ([facility] [address] [E11.10]     Current Treatment     TX: ASA. Levaquin. Protonix. Insulin. BP management    Consulted by Provider for advanced diabetes nursing assessment and care for:   [x] Transitioning off [address]   [x] Inpatient management strategy  [] Home management assessment  [] Survival skill education    Hospital Course   Clinical progress has been complicated by DKA. 08/15 Patient off Glucostabilizer, vasopressors and extubated over the weekend; has been on 16 units Lantus daily in the last 3 days. Diabetes History   The patient reports a 20 year history of Type 1 diabetes. He has a positive family hx of diabetes (mom & niece). He states today that he sees Dr. Sanjiv Renae (endocrinologist) for his diabetes. He was recently started on a Tandem insulin pump on 07/15/22. Unsure of which CGM is in use. Diabetes-related Medical History  Acute complications  DKA  Neurological complications  Peripheral neuropathy  Microvascular disease  Retinopathy    Diabetes Medication History  Key Antihyperglycemic Medications               insulin aspart U-100 (NovoLOG U-100 Insulin aspart) 100 unit/mL injection Use as instructed via insulin pump. Dx code E10.65 max daily dose 50U    insulin glargine (LANTUS) 100 unit/mL injection 16 Units by SubCUTAneous route daily. insulin aspart U-100 (NovoLOG Flexpen U-100 Insulin) 100 unit/mL (3 mL) inpn Take 1 unit for every 15 grams of carbohydrates, 1 unit for every 50 points >150. Max daily dose 25U. Overall evaluation:    [x] Achieving A1c target with drug therapy & self-care practices    Subjective   \" I have my pump all set\"     Objective   Physical exam  General Normal weight male. Conversant and cooperative, Sitting up on side of bed.   Neuro  Alert, oriented   Vital Signs Visit Vitals  BP (!) 185/95   Pulse 89   Temp 97.9 °F (36.6 °C)   Resp 18   Ht 5' 9\" (1.753 m)   Wt 91.4 kg (201 lb 8 oz)   SpO2 91%   BMI 29.76 kg/m²     Laboratory  Recent Labs     08/17/22  0106 08/16/22  0050    120*   AGAP 10 11   WBC 7.1 8.3   CREA 4.51* 4.66*   GFRNA 15* 14*   AST 18  --    ALT 22  --      Factors impacting BG management  Factor Dose Comments   Nutrition:  Standard meals   45 gram/meal      Pain Tylenol prn    Infection   Levofloxacin 750 mg Q 48 hours     Other:   Kidney function  Liver enzymes   GFR 18   Normal      Blood glucose pattern      Significant diabetes-related events over the past 24-72 hours    Assessment and Plan   Nursing Diagnosis Risk for unstable blood glucose pattern   Nursing Intervention Domain 5258 Decision-making Support   Nursing Interventions Examined current inpatient diabetes/blood glucose control   Explored factors facilitating and impeding inpatient management  Explored corrective strategies with patient and responsible inpatient provider   Informed patient of rational for insulin strategy while hospitalized     Evaluation   This 46year old patient with Type 1 diabetes did not achieve BG control prior to admission as evidenced by an A1c of 8.7%. Patient's A1c was much higher just one month ago prior to start of insulin pump therapy. Of concern, the patient had an admission glucose of 812. The patient was in DKA and was started on Glucostabilzer. He is now alert and off ventilator. In our discussion, he admits he did have some N/V prior to him \"falling out\". He has been on Lantus 16 units daily for the last 3 days, but his basal rate is the equivalent of 26 units per day. Vilma Maria He has received 22 units of correctional insulin in the last 24 hours. PTA, he was on a Tandem pump which was started 07/15/22 with a basal rate equated to 26 units daily. He is now eating and will likely require mealtime insulin coverage along with his basal dose to get him closer to his home dose.  Since the last hospitalization, he states he has been using a CGM for blood glucose monitoring. He states it is a Dexcom system, but unsure if this is accurate. He states he has an upcoming appointment with Dr. Geri Roldan. Some info copied from [address] [facility] note. 8/16/22 The patient's BG went to 73 yesterday afternoon on 16 units Lantus. His BG was 197 this morning. The patient may need an increase in basal dose, however from past hospitalizations, it is noted that the patient's BG's are labile. Therefore, a slow increase is recommended. Consider 22 units Lantus daily. Also consider decreasing meal time to 4 units Humalog with each meal.   8/17/22 The patient had a BG of 67 yesterday evening on 32 units Lantus. His dose was reduced to 20 units Lantus today. I agree with strategy. However, consider reducing meal time insulin dose to 4 units Humalog. The patient reports the desire to resume his insulin pump at discharge. I would like ensure the pump is on and working. The patient does not have his insulin pump  or supplies at this time. He reportedly will have a family member bring it. I will check back tomorrow. 8/18/22 The patient is now restarted on his Tandem insulin pump. He has a new insertion set, tubing and insulin vial. His insulin pump is charged and is working properly. Nursing notified. The hospitalist notified. Diabetes management will continue to monitor BG trends. Recommendations     Continue on Tandem inulin pump.     Billing Code(s)   [] [id number] IP subsequent hospital care - 35 minutes [] 96835 Prolonged Services - 65 minutes [] 94789 Prolonged Services - 110 minutes  [] 98014 IP subsequent hospital care - 25 minutes [] 40857 Prolonged Services - 55 minutes [] 78232 Prolonged Services - 100 minutes  [x] 52394 IP subsequent hospital care - 15 minutes [] 25369 Prolonged Services - 45 minutes [] 74606 Prolonged Services - 90 minutes    Before making these care recommendations, I personally reviewed the hospitalization record, including notes, laboratory & diagnostic data and current medications, and examined the patient at the bedside (circumstances permitting) before making care recommendations. More than fifty (50) percent of the time was spent in patient counseling and/or care coordination.   Total minutes: 15 minutes    Burnice Crews, CNS  Diabetes Clinical Nurse Specialist  [facility]  Access via [employer]

## 2022-08-18 NOTE — CONSULTS
Urology Consult    Patient: Kaley Grijalva MRN: [id number]  SSN: [id number]    YOB: 1980  Age: 46 y.o. Sex: male          Date of Consultation:  August 18, 2022  Requesting Physician: Amy Garcia MD  Reason for Consultation: urinary retention            Assessment/Plan:  Urinary retention s/p hansen placement   Hx of BPH with recurrent urinary retention   Failed inpatient voiding trials-hematuria from hansen placement  FELECIA on CKD stage 4  Scrotal swelling- anasarca     -continue hansen through discharge for bladder decompression, will arrange outpatient voiding trial   -prn manual irrigation for visualized clots  -flomax if bp tolerates    Supervising Dr. Heidy SWANN       History of Present Illness:  Patient is a 46 y.o. male admitted 8/11/2022 to the hospital for DKA (diabetic ketoacidosis) ([facility] [address] [E11.10]. He has a hx of DM type 1, bipolar disorder and CKD stage 4 admitted for ARF with DKA. Urology consulted for urinary retention. Known patient of [facility] urology for recurrent urinary retention. Urine draining clear yellow at bedside. Some reports of voiding dysfunction at home with nocturia x2 and incomplete emptying. No hematuria or dysuria. Voiding symptoms worse with scrotal edema and SOB. Chart reviewed:  No recent abd imaging  Creat 4.5 (b/l 2.8-3.5) ckd stage 4 hx. Creat improving since hansen placed  HD stable  No recent urine cx  Scrotal US- no acute process. Soft tissue edema. Receiving bumex    ==Last urology office note==    Merle Balderas is a 46year old male who presents today for \"Retention\". He returns for follow-up.  Previously seen by me for urinary retention and hx of emphysematous cystitis  Had some penile pain and meatal stenosis when he last saw me  his pvrs were in the 200 range  we planned to leave out catheter and perform renal ultrasound  since he last saw me he was admitted to hospital with blood sugars around 1000  reports he was told to stop flomax  reports he thinks flomax did help  he was on flomax and finsteride  No fevers no flank pain    PAST MEDICAL HISTORY:    Allergies: No known allergies. DENIES: Latex, Shellfish, X-Ray Dye, Iodine. Medications: Flomax 0.4 mg capsule (tamsulosin) Take 1 capsule by mouth every night   duloxetine 60 mg capsule,delayed release(DR/EC) (duloxetine)   finasteride 5 mg tablet (finasteride)   carvedilol 12.5 mg tablet (carvedilol)   Lantus Solostar U-100 Insulin 100 unit/mL (3 mL) insulin pen (insulin glargine)   pregabalin 75 mg capsule (pregabalin)   metoprolol tartrate 25 mg tablet (metoprolol tartrate)   rosuvastatin 40 mg tablet (rosuvastatin)   insulin aspart U-100 100 unit/mL (3 mL) insulin pen (insulin aspart u-100)   pantoprazole 40 mg tablet,delayed release (DR/EC) (pantoprazole)   furosemide 40 mg tablet (furosemide)   ondansetron 4 mg tablet,disintegrating (ondansetron) DISSOLVE 1 TABLET IN MOUTH EVERY 8 HOURS AS NEEDED FOR NAUSEA FOR VOMITING  lisinopril 20 mg tablet (lisinopril)     Problems: UTI (ICD-599.0) (GRG46-A09.0)  Hydronephrosis (ICD-591) (KLI88-X46.30)  BPH with urinary obstruction (ICD-600.01) (GRN33-K41.1)  Urinary retention (ICD-788.20) (VZB24-H98. 9)  Diabetes Mellitus (ICD-250.00) (KAU22-Z79.9)    Illnesses: Diabetes, High Blood Pressure, and Kidney Problems. DENIES: Heart Disease, Pacemaker/Defibrillator, Lung Disease, Bowel Problems, Stroke/Seizure, Bleeding Problems, HIV, Hepatitis, or Cancer. Surgeries: DENIES pertinent  surgeries      Family History: DENIES: Prostate cancer, Kidney cancer, Kidney disease, Kidney stones. Social History: Unemployed. Other. Smoking status: former smoker. Does not drink alcohol. System Review: Admits to: Difficulty Walking, Leg Swelling, and Lower Extremity Weakness.    DENIES: Unexplained Weight Loss, Dry Eyes, Dry Mouth, Shortness of Breath, Constipation, Involuntary Urine Loss, Dry Skin, Psychiatric Problems, Impaired Sex Drive, Easy Bleeding, Rash. EXAMINATION: Appearance: well-developed NAD Respiratory Effort: breathing easily Lower Extremity: no edema Abdomen/Flank: soft non-tender without masses; no CVA tenderness Liver/Spleen: no organomegaly Hernia: no ventral hernia     DIAGNOSTIC STUDIES:  IMPRESSION:    1. No hydronephrosis with Beard in place. 2. Normal appearance of the kidneys. 3. Decompressed bladder with nonspecific circumferential wall thickening. 4. Trace free pelvic fluid incidentally noted. ELECTRONICALLY SIGNED BY: Deette Leyden, MD      URINALYSIS from Voided specimen  Urine Micro not done    Prescription(s) Today: Flomax 0.4 mg capsule (tamsulosin) Take 1 capsule by mouth every night   #90 capsule x 3,  06/14/2022, Nicolle Hahn RN, SIGNED    IMPRESSION:    1. BPH WITH URINARY OBSTRUCTION (HOH04-O45.1) - Deteriorated: discussed restarting flomax since he felt this did help we perofrmed fill and pull today and he was able to completely empty we will see him back in a couple weeks with renal utlrasound. 2. DIABETES MELLITUS (EUA86-D35.9) - Deteriorated: poorly controlled he was admitted since he last saw me with sugars around 1000 we discussed blood surgar control and that if he has persistent elevated blood sugars he will most definitely burn out his bladder w/ atonic bladder  he may have already rendered it atonic. 3. URINARY RETENTION (FCI77-Y41.9) - Deteriorated: improved today  he passed voiding trial.         cc: Aracelis Landry MD  Today's Services  E&M Service    Upcoming Orders  Return Office Visit - with Trinh Cabrera MD in 2 weeks  U/S; Renal, UA        ]      Electronically signed by Trinh Cabrera MD on 06/14/2022 at 3:42 PM    ________________________________________________________________________        Past Medical History:  No Known Allergies   Prior to Admission medications    Medication Sig Start Date End Date Taking?  Authorizing Provider   furosemide (LASIX) 40 mg tablet Take 1 Tablet by mouth in the morning for 90 days. Take  by mouth daily. 8/2/22 10/31/22  Marcy Reis MD   finasteride (PROSCAR) 5 mg tablet Take 1 Tablet by mouth in the morning. 7/25/22   Marcy Reis MD   insulin aspart U-100 (NovoLOG U-100 Insulin aspart) 100 unit/mL injection Use as instructed via insulin pump. Dx code E10.65 max daily dose 50U 7/15/22   Heath Sosa MD   rosuvastatin (CRESTOR) 40 mg tablet  6/21/22   Provider, Stephie   pregabalin (Lyrica) 75 mg capsule Take 1 Capsule by mouth two (2) times a day. Max Daily Amount: 150 mg. 7/14/22   MD Tan [facility] (Ultra-Light Rollator) misc Use while ambulating 7/14/22   Marcy Reis MD   amLODIPine (NORVASC) 10 mg tablet Take 1 Tablet by mouth daily. 7/14/22   Marcy Reis MD   cloNIDine (CATAPRES) 0.1 mg/24 hr ptwk 1 Patch by TransDERmal route every seven (7) days. 7/14/22   Marcy Reis MD   Dexcom G6  misc Use with the dexcom device to check blood sugars 4 times a day 7/1/22   Heath Sosa MD   DULoxetine (CYMBALTA) 60 mg capsule Take 1 capsule by mouth once daily 6/22/22   Marcy Reis MD   Dexcom G6 Sensor brandi Use as directed every 10 days 6/21/22   Heath Sosa MD   Dexcom G6 Transmitter brandi Use as directed 6/21/22   Heath Sosa MD   FreeStyle Connor 14 Day Sensor kit Use as directed every 14 days 6/21/22   Heath Sosa MD   Insulin Needles, Disposable, 31 gauge x 5/16\" ndle Dx code E11.65, use 6x daily 6/21/22   Heath Sosa MD   insulin glargine (LANTUS) 100 unit/mL injection 16 Units by SubCUTAneous route daily. 6/21/22   Heath Sosa MD   insulin aspart U-100 (NovoLOG Flexpen U-100 Insulin) 100 unit/mL (3 mL) inpn Take 1 unit for every 15 grams of carbohydrates, 1 unit for every 50 points >150. Max daily dose 25U. 6/21/22   Heath Sosa MD   rosuvastatin (CRESTOR) 20 mg tablet Take 1 Tablet by mouth nightly.   Patient not taking: Reported on 7/14/2022 6/2/22 Jey Ramsey MD   carvediloL (COREG) 12.5 mg tablet Take 1 Tablet by mouth two (2) times daily (with meals). 5/19/22   Jey Ramsey MD   tamsulosin (FLOMAX) 0.4 mg capsule Take 0.4 mg by mouth daily. Provider, Historical   pen needle, diabetic 30 gauge x 3/16\" ndle 5 Units by Does Not Apply route Before breakfast, lunch, and dinner. 2/23/22   Jey Ramsey MD   ergocalciferol (ERGOCALCIFEROL) 1,250 mcg (50,000 unit) capsule Take 1 capsule by mouth once a week  Patient not taking: Reported on 5/10/2022 10/30/21   Jey Ramsey MD      PMHx:  has a past medical history of Bipolar 1 disorder, depressed ([address] Bipolar disorder ([address] Chronic kidney disease, stage 3a ([address] Depression, Diabetes ([address] DKA, type 1 ([address] (1/27/2013), DKA, type 1 ([address] H/O noncompliance with medical treatment, presenting hazards to health, MRSA (methicillin resistant staph aureus) culture positive, MRSA (methicillin resistant Staphylococcus aureus), Noncompliance with medication regimen, Second hand smoke exposure, Seizure ([address] and Seizures ([address] (2006 or 2007). PSurgHx:  has a past surgical history that includes hx orthopaedic (Left); hx heent; and upper gi endoscopy,biopsy (11/20/2018). PSocHx:  reports that he quit smoking about 2 years ago. His smoking use included cigarettes. He started smoking about 22 years ago. He has a 1.60 pack-year smoking history. He has never used smokeless tobacco. He reports that he does not drink alcohol and does not use drugs. ROS:  Admission ROS by Chika Marquez MD from 8/11/2022 were reviewed with the patient and changes (other than per HPI) include: none.     Physical Exam    General Appearance: NAD, awake  HENT: atraumatic, normal ears  Cardiovascular: not tachycardic, some LE edema  Respiratory: no distress, room air  Abdomen: soft, no suprapubic fullness or tenderness  : no CVA tenderness, hansen clear yellow, scrotal edema  Extremities: moves all  Musculoskeletal: normal alignment of neck and head  Neuro: Appropriate, no focal neurological deficits  Mood/Affect: appropriate, A&O x 3      Lab Results   Component Value Date/Time    WBC 7.1 08/17/2022 01:06 AM    HCT 28.3 (L) 08/17/2022 01:06 AM    PLATELET 169 44/44/0781 01:06 AM    Sodium 135 (L) 08/17/2022 01:06 AM    Potassium 4.1 08/17/2022 01:06 AM    Chloride 103 08/17/2022 01:06 AM    CO2 22 08/17/2022 01:06 AM    BUN 61 (H) 08/17/2022 01:06 AM    Creatinine 4.51 (H) 08/17/2022 01:06 AM    Glucose 100 08/17/2022 01:06 AM    Calcium 8.5 08/17/2022 01:06 AM    Magnesium 2.3 08/17/2022 01:06 AM    INR 1.0 02/08/2022 07:22 PM    Prostate Specific Ag 0.2 06/02/2022 11:54 AM       UA:   Lab Results   Component Value Date/Time    Color YELLOW/STRAW 08/12/2022 12:40 AM    Appearance CLEAR 08/12/2022 12:40 AM    Specific gravity 1.017 08/12/2022 12:40 AM    Specific gravity 1.020 05/08/2022 09:06 PM    pH (UA) 5.5 08/12/2022 12:40 AM    Protein 300 (A) 08/12/2022 12:40 AM    Glucose >1,000 (A) 08/12/2022 12:40 AM    Ketone 15 (A) 08/12/2022 12:40 AM    Bilirubin Negative 08/12/2022 12:40 AM    Urobilinogen 0.2 08/12/2022 12:40 AM    Nitrites Negative 08/12/2022 12:40 AM    Leukocyte Esterase Negative 08/12/2022 12:40 AM    Epithelial cells FEW 08/12/2022 12:40 AM    Bacteria Negative 08/12/2022 12:40 AM    WBC 5-10 08/12/2022 12:40 AM    RBC 5-10 08/12/2022 12:40 AM           Signed By: Blair Redman NP  - August 18, 2022

## 2022-08-18 NOTE — PROGRESS NOTES
[facility] or Facility: [facility] From: Juan Joe RE: Latoya Pay 1980 RM: [id number] Patient developing bright red blood from hansen cath. Per night nurse it is new within last 20mins. Backstory is hansen was taken out yesterday and then replaced just after midnight for retention. Had scant dranaige but now new blood in hansen tube with clots.  At this time patient doesnt have any complaints Need Callback: NO CALLBACK [address]

## 2022-08-18 NOTE — PROGRESS NOTES
[facility] to delivered rollator tomorrow.       ELIZABETH VillalpandoN, RN    Care Management  [phone]

## 2022-08-18 NOTE — PROGRESS NOTES
Hospitalist Progress Note    NAME: Soledad Dior   :  1980   MRN:  [id number]       Assessment / Plan:  45 y/o male history of poorly controlled T1DM, bipolar disorder, and CKD 3 admitted  for acute hypoxic respiratory failure in the setting of severe diabetic ketoacidosis. EMS called for generalized lethargy and increased work of breathing. Had DKA on presentation, was obtunded, got intubated  and extubated . Acute respiratory failure requiring intubation   Community acquired pneumonia POA  Acute pulmonary edema  Anasarca  Urinary retention  Hematuria, from hansen trauma  Intubated on , likely d/t respiratory failure from acidosis  Extubated on   Completed a course of levaquin  CXR showing acute pulmonary edema, bilateral pleural effusions  Bumex increased to 4mg BID today  Failed voiding trial.  Developed hematuria from traumatic hansen re-insertion. Urology is consulted  [address] nephrology following    FELECIA on CKD Stage 4  Creatinine is stable  No urgent need for HD. He is making good UO  C/w PO bicarb  Nephrology following    DKA   T1DM  DKA resolved. Off insulin gtt, transitioned to to lantus but now is back on insulin pump. Appreciate diabetic team following. Outpt f/u with Dr Peña Bruno    Elevated troponin  Hypertensive urgency  Bradycardia  Believed to be demand ischemia  Adjust antihypertensives, adding hydralazine today for better BP control. Increase clonidine dose  Cardiology signed off  ECHO normal ef. Normal diastolic fxn, small pericardial effusion    Scrotal swelling  Could be d/t fluid overload from CKD  Also has some pain  Scrotal sono: shows superficial soft tissue edema  Cont' elevation. Bumex as above    Slurred speech on   ? laceration on posterior scalp  Doesn't remember having any trauma  CT head was done for ?slurred speech, negative  Seems at baseline now    Estimated discharge date:   Barriers: Scrotal swelling/ pain/ anasarca/ PT/OT consult    Code status: Full  Prophylaxis: Lovenox  Recommended Disposition: Home w/Family     Subjective:     Chief Complaint / Reason for Physician Visit  Follow up for DKA  C/o of scrotum edema and pain. Failed voiding trial overnight, required hansen re-insertion, now with hematuria. Review of Systems:  Symptom Y/N Comments  Symptom Y/N Comments   Fever/Chills n   Chest Pain n    Poor Appetite n   Edema n    Cough    Abdominal Pain     Sputum    Joint Pain     SOB/JOHNSTON    Pruritis/Rash     Nausea/vomit    Tolerating PT/OT     Diarrhea    Tolerating Diet     Constipation    Other x Swelling, pain     Could NOT obtain due to:      Objective:     VITALS:   Last 24hrs VS reviewed since prior progress note. Most recent are:  Patient Vitals for the past 24 hrs:   Temp Pulse Resp BP SpO2   08/18/22 0833 98 °F (36.7 °C) 89 18 (!) 179/89 90 %   08/18/22 0632 97.6 °F (36.4 °C) 91 20 (!) 180/87 93 %   08/17/22 2216 -- -- -- (!) 199/85 --   08/17/22 2014 98.2 °F (36.8 °C) 85 18 (!) 203/85 94 %   08/17/22 1551 98.1 °F (36.7 °C) 95 18 (!) 202/101 95 %   08/17/22 1337 -- 87 -- (!) 153/91 --   08/17/22 1138 -- 94 -- (!) 200/100 --   08/17/22 1131 -- -- -- (!) 180/53 --         Intake/Output Summary (Last 24 hours) at 8/18/2022 1040  Last data filed at 8/18/2022 0646  Gross per 24 hour   Intake 610 ml   Output 3606 ml   Net -2996 ml          I had a face to face encounter and independently examined this patient on 8/18/2022, as outlined below:  PHYSICAL EXAM:  General: Awake, No acute distress  EENT:  EOMI. Anicteric sclerae. MMM  Resp:  CTA bilaterally, no wheezing or rales. No accessory muscle use  CV:  Regular  rhythm,  scrotal edema  GI:  Soft, Non distended, Non tender.   +Bowel sounds  : Scrotal edema and redness  Neurologic:  Alert and oriented X 3, normal speech,   Psych:   Not anxious nor agitated  Ext: B/l Le edema    Reviewed most current lab test results and cultures  YES  Reviewed most current radiology test results   YES  Review and summation of old records today    NO  Reviewed patient's current orders and MAR    YES  PMH/SH reviewed - no change compared to H&P  ________________________________________________________________________  Care Plan discussed with:    Comments   Patient y    Family      RN y    Care Manager     Consultant                       y Multidiciplinary team rounds were held today with , nursing, pharmacist and clinical coordinator. Patient's plan of care was discussed; medications were reviewed and discharge planning was addressed. ________________________________________________________________________  Total NON critical care TIME:  36   Minutes    Total CRITICAL CARE TIME Spent:   Minutes non procedure based      Comments   >50% of visit spent in counseling and coordination of care     ________________________________________________________________________  Jo-Ann Tyler MD     Procedures: see electronic medical records for all procedures/Xrays and details which were not copied into this note but were reviewed prior to creation of Plan. LABS:  I reviewed today's most current labs and imaging studies.   Pertinent labs include:  Recent Labs     08/17/22  0106 08/16/22  0050   WBC 7.1 8.3   HGB 9.4* 8.9*   HCT 28.3* 26.9*    298       Recent Labs     08/17/22  0106 08/16/22  0050   * 136   K 4.1 4.3    106   CO2 22 19*    120*   BUN 61* 58*   CREA 4.51* 4.66*   CA 8.5 8.3*   MG 2.3  --    PHOS 5.8* 5.7*   ALB 2.0* 2.1*   TBILI 0.2  --    ALT 22  --          Signed: Jo-Ann Tyler MD

## 2022-08-18 NOTE — PROGRESS NOTES
Received notification from bedside RN about patient with regards to: failed voiding trial, post void scan 433 ml. Urine noted to be red/pink tinged.  Requesting medication for pain as Morphine did not provide adequate relief  VS: /85, HR 85, RR 18, O2 sat 94%    Intervention given: Urojet, reinsert hansen, Dilaudid 0.5 mg IV x 1 dose, Urology consultation ordered

## 2022-08-18 NOTE — PROGRESS NOTES
Physical Therapy note    Pt chart reviewed and attempted to see for therapy services. Pt politely declining PT services at this time due to scrotal edema and increased pain with mobility. Pt visually showing this writer scrotal area to prove edema. Pt reports ambulating with nursing assistance from chair to bed and with intense pain levels (10/10) with transfer. Will defer and continue to follow. Anticipate good progress pending pain management, continue to recommend HHPT.      Ian Dempsey, PTA

## 2022-08-18 NOTE — PROGRESS NOTES
This nurse in room to assess patient at this time and patient is noted to have large amount of emesis at this time. MD notified.

## 2022-08-18 NOTE — PROGRESS NOTES
2000: SPAIN REMOVED, VOIDING TRIAL INITIATED.    2200: OUTPUT OF <100ML    2340: BLADDER SCAN REVEALS RETENTION OF 280ML    0154: OUTPUT  ML NOTED PINK TINGED AT THIS TIME     0212: BLADDER SCAN REVEALS 423ML RETENTION POST USE OF URINAL.  WILL NOTIFY MD

## 2022-08-19 LAB
ALBUMIN SERPL-MCNC: 2.6 G/DL (ref 3.5–5)
ANION GAP SERPL CALC-SCNC: 10 MMOL/L (ref 5–15)
BASOPHILS # BLD: 0 K/UL (ref 0–0.1)
BASOPHILS NFR BLD: 0 % (ref 0–1)
BUN SERPL-MCNC: 53 MG/DL (ref 6–20)
BUN/CREAT SERPL: 12 (ref 12–20)
CALCIUM SERPL-MCNC: 8.9 MG/DL (ref 8.5–10.1)
CHLORIDE SERPL-SCNC: 101 MMOL/L (ref 97–108)
CO2 SERPL-SCNC: 25 MMOL/L (ref 21–32)
CREAT SERPL-MCNC: 4.41 MG/DL (ref 0.7–1.3)
DIFFERENTIAL METHOD BLD: ABNORMAL
EOSINOPHIL # BLD: 0.4 K/UL (ref 0–0.4)
EOSINOPHIL NFR BLD: 5 % (ref 0–7)
ERYTHROCYTE [DISTWIDTH] IN BLOOD BY AUTOMATED COUNT: 15.4 % (ref 11.5–14.5)
GLUCOSE BLD STRIP.AUTO-MCNC: 136 MG/DL (ref 65–117)
GLUCOSE BLD STRIP.AUTO-MCNC: 77 MG/DL (ref 65–117)
GLUCOSE BLD STRIP.AUTO-MCNC: 91 MG/DL (ref 65–117)
GLUCOSE BLD STRIP.AUTO-MCNC: 99 MG/DL (ref 65–117)
GLUCOSE SERPL-MCNC: 98 MG/DL (ref 65–100)
HCT VFR BLD AUTO: 24.7 % (ref 36.6–50.3)
HGB BLD-MCNC: 8.1 G/DL (ref 12.1–17)
IMM GRANULOCYTES # BLD AUTO: 0.1 K/UL (ref 0–0.04)
IMM GRANULOCYTES NFR BLD AUTO: 1 % (ref 0–0.5)
LYMPHOCYTES # BLD: 1.6 K/UL (ref 0.8–3.5)
LYMPHOCYTES NFR BLD: 19 % (ref 12–49)
MCH RBC QN AUTO: 28.6 PG (ref 26–34)
MCHC RBC AUTO-ENTMCNC: 32.8 G/DL (ref 30–36.5)
MCV RBC AUTO: 87.3 FL (ref 80–99)
MONOCYTES # BLD: 1 K/UL (ref 0–1)
MONOCYTES NFR BLD: 12 % (ref 5–13)
NEUTS SEG # BLD: 5.3 K/UL (ref 1.8–8)
NEUTS SEG NFR BLD: 63 % (ref 32–75)
NRBC # BLD: 0 K/UL (ref 0–0.01)
NRBC BLD-RTO: 0 PER 100 WBC
PHOSPHATE SERPL-MCNC: 5.6 MG/DL (ref 2.6–4.7)
PLATELET # BLD AUTO: 281 K/UL (ref 150–400)
PMV BLD AUTO: 10.3 FL (ref 8.9–12.9)
POTASSIUM SERPL-SCNC: 3.7 MMOL/L (ref 3.5–5.1)
RBC # BLD AUTO: 2.83 M/UL (ref 4.1–5.7)
SERVICE CMNT-IMP: ABNORMAL
SERVICE CMNT-IMP: NORMAL
SODIUM SERPL-SCNC: 136 MMOL/L (ref 136–145)
WBC # BLD AUTO: 8.4 K/UL (ref 4.1–11.1)

## 2022-08-19 PROCEDURE — 99231 SBSQ HOSP IP/OBS SF/LOW 25: CPT

## 2022-08-19 PROCEDURE — 74011250636 HC RX REV CODE- 250/636: Performed by: INTERNAL MEDICINE

## 2022-08-19 PROCEDURE — 36415 COLL VENOUS BLD VENIPUNCTURE: CPT

## 2022-08-19 PROCEDURE — 74011000250 HC RX REV CODE- 250: Performed by: INTERNAL MEDICINE

## 2022-08-19 PROCEDURE — 80069 RENAL FUNCTION PANEL: CPT

## 2022-08-19 PROCEDURE — 74011000250 HC RX REV CODE- 250: Performed by: NURSE PRACTITIONER

## 2022-08-19 PROCEDURE — 82962 GLUCOSE BLOOD TEST: CPT

## 2022-08-19 PROCEDURE — 97535 SELF CARE MNGMENT TRAINING: CPT

## 2022-08-19 PROCEDURE — 94760 N-INVAS EAR/PLS OXIMETRY 1: CPT

## 2022-08-19 PROCEDURE — 65270000029 HC RM PRIVATE

## 2022-08-19 PROCEDURE — 97116 GAIT TRAINING THERAPY: CPT

## 2022-08-19 PROCEDURE — 74011250637 HC RX REV CODE- 250/637: Performed by: INTERNAL MEDICINE

## 2022-08-19 PROCEDURE — 74011250637 HC RX REV CODE- 250/637: Performed by: STUDENT IN AN ORGANIZED HEALTH CARE EDUCATION/TRAINING PROGRAM

## 2022-08-19 PROCEDURE — 85025 COMPLETE CBC W/AUTO DIFF WBC: CPT

## 2022-08-19 PROCEDURE — 74011250637 HC RX REV CODE- 250/637: Performed by: NURSE PRACTITIONER

## 2022-08-19 RX ORDER — METOLAZONE 2.5 MG/1
5 TABLET ORAL ONCE
Status: COMPLETED | OUTPATIENT
Start: 2022-08-19 | End: 2022-08-19

## 2022-08-19 RX ADMIN — CASTOR OIL AND BALSAM, PERU: 788; 87 OINTMENT TOPICAL at 21:29

## 2022-08-19 RX ADMIN — PANTOPRAZOLE SODIUM 40 MG: 40 TABLET, DELAYED RELEASE ORAL at 09:49

## 2022-08-19 RX ADMIN — HYDRALAZINE HYDROCHLORIDE 100 MG: 50 TABLET, FILM COATED ORAL at 09:49

## 2022-08-19 RX ADMIN — CARVEDILOL 25 MG: 12.5 TABLET, FILM COATED ORAL at 17:50

## 2022-08-19 RX ADMIN — CARVEDILOL 25 MG: 12.5 TABLET, FILM COATED ORAL at 09:49

## 2022-08-19 RX ADMIN — BUMETANIDE 4 MG: 0.25 INJECTION INTRAMUSCULAR; INTRAVENOUS at 09:49

## 2022-08-19 RX ADMIN — OXYCODONE 5 MG: 5 TABLET ORAL at 01:15

## 2022-08-19 RX ADMIN — MORPHINE SULFATE 1 MG: 2 INJECTION, SOLUTION INTRAMUSCULAR; INTRAVENOUS at 07:07

## 2022-08-19 RX ADMIN — OXYCODONE 5 MG: 5 TABLET ORAL at 23:05

## 2022-08-19 RX ADMIN — ROSUVASTATIN CALCIUM 10 MG: 10 TABLET, FILM COATED ORAL at 21:28

## 2022-08-19 RX ADMIN — SODIUM BICARBONATE 650 MG: 650 TABLET ORAL at 21:28

## 2022-08-19 RX ADMIN — MORPHINE SULFATE 1 MG: 2 INJECTION, SOLUTION INTRAMUSCULAR; INTRAVENOUS at 13:37

## 2022-08-19 RX ADMIN — OXYCODONE 5 MG: 5 TABLET ORAL at 09:58

## 2022-08-19 RX ADMIN — MORPHINE SULFATE 1 MG: 2 INJECTION, SOLUTION INTRAMUSCULAR; INTRAVENOUS at 17:50

## 2022-08-19 RX ADMIN — HYDRALAZINE HYDROCHLORIDE 100 MG: 50 TABLET, FILM COATED ORAL at 21:28

## 2022-08-19 RX ADMIN — CASTOR OIL AND BALSAM, PERU: 788; 87 OINTMENT TOPICAL at 09:51

## 2022-08-19 RX ADMIN — AMLODIPINE BESYLATE 5 MG: 5 TABLET ORAL at 09:49

## 2022-08-19 RX ADMIN — SENNOSIDES AND DOCUSATE SODIUM 1 TABLET: 50; 8.6 TABLET ORAL at 09:49

## 2022-08-19 RX ADMIN — SODIUM CHLORIDE, PRESERVATIVE FREE 10 ML: 5 INJECTION INTRAVENOUS at 17:55

## 2022-08-19 RX ADMIN — BUMETANIDE 4 MG: 0.25 INJECTION INTRAMUSCULAR; INTRAVENOUS at 18:37

## 2022-08-19 RX ADMIN — ASPIRIN 81 MG: 81 TABLET, COATED ORAL at 09:49

## 2022-08-19 RX ADMIN — SODIUM BICARBONATE 650 MG: 650 TABLET ORAL at 17:50

## 2022-08-19 RX ADMIN — HYDRALAZINE HYDROCHLORIDE 100 MG: 50 TABLET, FILM COATED ORAL at 17:50

## 2022-08-19 RX ADMIN — MORPHINE SULFATE 1 MG: 2 INJECTION, SOLUTION INTRAMUSCULAR; INTRAVENOUS at 21:51

## 2022-08-19 RX ADMIN — SODIUM CHLORIDE, PRESERVATIVE FREE 10 ML: 5 INJECTION INTRAVENOUS at 21:30

## 2022-08-19 RX ADMIN — METOLAZONE 5 MG: 2.5 TABLET ORAL at 13:37

## 2022-08-19 RX ADMIN — SODIUM BICARBONATE 650 MG: 650 TABLET ORAL at 09:49

## 2022-08-19 NOTE — PROGRESS NOTES
Nephrology Progress Note  [facility] / [address], [address]  [place], [address]  Phone - [phone]  Fax - [phone]                 Patient: Harriett Homans                   YOB: 1980        Date- 8/19/2022                      Admit Date: 8/11/2022  CC: Follow up for acute kidney injury         IMPRESSION & PLAN:   Acute kidney injury on CKD stage IV  CKD stage IV(baseline creatinine 2.8-3.5)(secondary to uncontrolled diabetes)  edema  Non-anion gap metabolic acidosis  Diabetic ketoacidosis  Type 1 diabetes uncontrolled  Hyperphosphatemia  Elevated troponins  Community-acquired pneumonia  Acute hypoxic respiratory failure on mechanical ventilation    PLAN-  Continue bumex - iv  Metolazone 5 mg p.o. today. NO RRT indicated  If he is discharged over the weekend he will go home on Lasix 80 mg twice a day  Keep hansen in place  Continue norvasc 5 mg   Add Cardura if blood pressure remains high   Keep Hansen in place  Continue norvasc, clonidine, hydralazine  Avoid acei or arb  Continue sod. bicarb     Subjective:   Interval History:   Blood pressure remains high  No complaint of shortness of breath  He is on room air  Complain of leg edema and scrotal edema  Creatinine stable      Objective:   Vitals:    08/18/22 2315 08/19/22 0320 08/19/22 0855 08/19/22 1114   BP: (!) 156/77 (!) 155/75 (!) 190/84 (!) 163/85   Pulse: 93 89 87 89   Resp: 16 18 18 18   Temp: 98.7 °F (37.1 °C) 97.9 °F (36.6 °C) 97.8 °F (36.6 °C) 97.8 °F (36.6 °C)   SpO2: 91% 92% 94% 100%   Weight:       Height:          08/18 0701 - 08/19 0700  In: -   Out: 1850   Last 3 Recorded Weights in this Encounter    08/14/22 0500 08/15/22 0343 08/17/22 0058   Weight: 89.1 kg (196 lb 6.9 oz) 91 kg (200 lb 9.9 oz) 91.4 kg (201 lb 8 oz)      Physical exam:    GEN: NAD  NECK:  Supple, no thyromegaly  RESP: Clear bilaterally, no  wheezing,   CVS: RRR,S1,S2 NEURO: non focal, normal speech  EXT: Edema +nt     Beard +  Scrotal edema +    Chart reviewed. Pertinent Notes reviewed. Data Review :  Recent Labs     08/19/22  0642 08/17/22  0106    135*   K 3.7 4.1    103   CO2 25 22   BUN 53* 61*   CREA 4.41* 4.51*   GLU 98 100   CA 8.9 8.5   MG  --  2.3   PHOS 5.6* 5.8*       Recent Labs     08/19/22  0642 08/17/22  0106   WBC 8.4 7.1   HGB 8.1* 9.4*   HCT 24.7* 28.3*    304       No results for input(s): FE, TIBC, PSAT, FERR in the last 72 hours.    Medication list  reviewed  Current Facility-Administered Medications   Medication Dose Route Frequency    insulin pump (PATIENT SUPPLIED)   SubCUTAneous PRN    metOLazone (ZAROXOLYN) tablet 5 mg  5 mg Oral ONCE    cloNIDine (CATAPRES) 0.2 mg/24 hr patch 1 Patch  1 Patch TransDERmal Q7D    hydrALAZINE (APRESOLINE) tablet 100 mg  100 mg Oral TID    bumetanide (BUMEX) injection 4 mg  4 mg IntraVENous BID    amLODIPine (NORVASC) tablet 5 mg  5 mg Oral DAILY    morphine injection 1 mg  1 mg IntraVENous Q4H PRN    oxymetazoline (AFRIN) 0.05 % nasal spray 2 Spray  2 Spray Both Nostrils BID PRN    carvediloL (COREG) tablet 25 mg  25 mg Oral BID WITH MEALS    oxyCODONE IR (ROXICODONE) tablet 5 mg  5 mg Oral Q6H PRN    sodium chloride (OCEAN) 0.65 % nasal squeeze bottle 2 Spray  2 Spray Both Nostrils Q2H PRN    albuterol-ipratropium (DUO-NEB) 2.5 MG-0.5 MG/3 ML  3 mL Nebulization Q4H PRN    pantoprazole (PROTONIX) tablet 40 mg  40 mg Oral ACB    [Held by provider] heparin (porcine) injection 5,000 Units  5,000 Units SubCUTAneous Q8H    rosuvastatin (CRESTOR) tablet 10 mg  10 mg Oral QHS    aspirin delayed-release tablet 81 mg  81 mg Oral DAILY    labetaloL (NORMODYNE;TRANDATE) injection 20 mg  20 mg IntraVENous Q4H PRN    hydrALAZINE (APRESOLINE) 20 mg/mL injection 20 mg  20 mg IntraVENous Q6H PRN    sodium bicarbonate tablet 650 mg  650 mg Oral TID    glucose chewable tablet 16 g  4 Tablet Oral PRN glucagon (GLUCAGEN) injection 1 mg  1 mg IntraMUSCular PRN    dextrose 10% infusion 0-250 mL  0-250 mL IntraVENous PRN    senna-docusate (PERICOLACE) 8.6-50 mg per tablet 1 Tablet  1 Tablet Oral DAILY    sodium chloride (NS) flush 5-40 mL  5-40 mL IntraVENous Q8H    sodium chloride (NS) flush 5-40 mL  5-40 mL IntraVENous PRN    acetaminophen (TYLENOL) tablet 650 mg  650 mg Oral Q6H PRN    Or    acetaminophen (TYLENOL) suppository 650 mg  650 mg Rectal Q6H PRN    polyethylene glycol (MIRALAX) packet 17 g  17 g Oral DAILY PRN    ondansetron (ZOFRAN) injection 4 mg  4 mg IntraVENous Q6H PRN    chlorhexidine (ORAL CARE KIT) 0.12 % mouthwash 15 mL  15 mL Oral Q12H    0.9% sodium chloride infusion 250 mL  250 mL IntraVENous PRN    balsam peru-castor oiL (VENELEX) ointment   Topical Q12H    alcohol 62% (NOZIN) nasal  1 Ampule  1 Ampule Topical Q12H          Sarai Michel MD  8/19/2022

## 2022-08-19 NOTE — PROGRESS NOTES
Spiritual Care Assessment/Progress Note  [facility]      NAME: Topher Roa      MRN: [id number]  AGE: 46 y.o.  SEX: male  Nondenominational Affiliation: Religious   Language: English     8/19/2022     Total Time (in minutes): 14     Spiritual Assessment begun in [facility] 2 MED TELE through conversation with:         [x]Patient        [] Family    [] Friend(s)        Reason for Consult: Initial/Spiritual assessment, patient floor     Spiritual beliefs: (Please include comment if needed)     [x] Identifies with a keerthi tradition:         [] Supported by a keerthi community:            [] Claims no spiritual orientation:           [] Seeking spiritual identity:                [] Adheres to an individual form of spirituality:           [] Not able to assess:                           Identified resources for coping:      [] Prayer                               [] Music                  [] Guided Imagery     [x] Family/friends                 [] Pet visits     [] Devotional reading                         [] Unknown     [] Other:                                                Interventions offered during this visit: (See comments for more details)    Patient Interventions: Coping skills reviewed/reinforced, Initial/Spiritual assessment, patient floor, Life review/legacy, Normalization of emotional/spiritual concerns, Nondenominational beliefs/image of God discussed, Affirmation of keerthi, Initial/Spiritual assessment, Critical care           Plan of Care:     [] Support spiritual and/or cultural needs    [] Support AMD and/or advance care planning process      [] Support grieving process   [] Coordinate Rites and/or Rituals    [] Coordination with community clergy   [x] No spiritual needs identified at this time   [] Detailed Plan of Care below (See Comments)  [] Make referral to Music Therapy  [] Make referral to Pet Therapy     [] Make referral to Addiction services  [] Make referral to [facility]  [] Make referral to Spiritual Care Partner  [] No future visits requested        [] Contact Spiritual Care for further referrals     Comments:  's visit was in 2123 for an initial spiritual assessment. Patient was in his recliner and comfortable. He received visit kindly and engaged in conversation. Indicating he was doing well at this time. He  shared what led to his hospitalization as well as family and keerthi stories. He expressed no need for support when  inquired. He was grateful for the visit.        Visited by: Shay crocker: [phone])

## 2022-08-19 NOTE — DIABETES MGMT
[address] [facility]    CLINICAL NURSE SPECIALIST CONSULT     Initial Presentation   Andrew Robledo is a 46 y.o. male admitted 8/11/2022 after experiencing nausea, vomiting and AMS. Past hx of DKA. EMS reports patient was satting in the 76s, with hypertension on their arrival.  Patient was seen in emergency department several days ago with complaints of bilateral lower extremity edema and referred to nephrology for further management of his acute on chronic renal insufficiency. Low temp, low HR & low BP. O2 sats 98%  ER exam:   Cardiovascular:     Rate and Rhythm: Regular rhythm. Bradycardia present. Heart sounds: Normal heart sounds. No murmur heard. No friction rub. Pulmonary:     Effort: Bradypnea and prolonged expiration present. No respiratory distress. Breath sounds: No stridor. No wheezing or rales. Abdominal:     General: Bowel sounds are normal. There is no distension. Palpations: Abdomen is soft. Tenderness: There is no abdominal tenderness. There is no rebound. Musculoskeletal:         General: No tenderness. Normal range of motion. Cervical back: Normal range of motion and neck supple. Right lower leg: Edema present. Left lower leg: Edema present. Comments: Moving all extremities independently   LAB: WBC 12.6. Glucose 812. Creatine 5.93. GFR 11. Anion Gap 25. A1c 8.7  CXR:   Interval development of bilateral hazy lung opacities concerning for edema or   infection.      HX:   Past Medical History:   Diagnosis Date    Bipolar 1 disorder, depressed ([facility]     Bipolar disorder ([facility]     Chronic kidney disease, stage 3a ([address]     Depression     Diabetes ([facility]     DKA, type 1 ([facility] 1/27/2013    diagnosed age 23    DKA, type 1 ([address]     H/O noncompliance with medical treatment, presenting hazards to health     MRSA (methicillin resistant staph aureus) culture positive     MRSA (methicillin resistant Staphylococcus aureus)     Face    Noncompliance with medication regimen     Second hand smoke exposure     Seizure ([facility]     Seizures ([facility] 2006 or 2007    one episode during care home      INITIAL DX:   DKA (diabetic ketoacidosis) ([facility] [E11.10]     Current Treatment     TX: ASA. Levaquin. Protonix. Insulin. BP management    Consulted by Provider for advanced diabetes nursing assessment and care for:   [x] Transitioning off Charissa Silvius   [x] Inpatient management strategy  [] Home management assessment  [] Survival skill education    Hospital Course   Clinical progress has been complicated by DKA. 08/15 Patient off Glucostabilizer, vasopressors and extubated over the weekend; has been on 16 units Lantus daily in the last 3 days. Diabetes History   The patient reports a 20 year history of Type 1 diabetes. He has a positive family hx of diabetes (mom & niece). He states today that he sees Dr. Matty Monterroso (endocrinologist) for his diabetes. He was recently started on a Tandem insulin pump on 07/15/22. Unsure of which CGM is in use. Diabetes-related Medical History  Acute complications  DKA  Neurological complications  Peripheral neuropathy  Microvascular disease  Retinopathy    Diabetes Medication History  Key Antihyperglycemic Medications               insulin aspart U-100 (NovoLOG U-100 Insulin aspart) 100 unit/mL injection Use as instructed via insulin pump. Dx code E10.65 max daily dose 50U    insulin glargine (LANTUS) 100 unit/mL injection 16 Units by SubCUTAneous route daily. insulin aspart U-100 (NovoLOG Flexpen U-100 Insulin) 100 unit/mL (3 mL) inpn Take 1 unit for every 15 grams of carbohydrates, 1 unit for every 50 points >150. Max daily dose 25U. Overall evaluation:    [x] Achieving A1c target with drug therapy & self-care practices    Subjective   \"My pump is working fine\"     Objective   Physical exam  General Normal weight male. Conversant and cooperative, Sitting up on side of bed.   Neuro  Alert, oriented   Vital Signs Visit Vitals  BP (!) 163/85   Pulse 89   Temp 97.8 °F (36.6 °C)   Resp 18   Ht 5' 9\" (1.753 m)   Wt 91.4 kg (201 lb 8 oz)   SpO2 100%   BMI 29.76 kg/m²     Laboratory  Recent Labs     08/19/22  0642 08/17/22  0106   GLU 98 100   AGAP 10 10   WBC 8.4 7.1   CREA 4.41* 4.51*   GFRNA 15* 15*   AST  --  18   ALT  --  22     Factors impacting BG management  Factor Dose Comments   Nutrition:  Standard meals   45 gram/meal      Pain Tylenol prn    Infection   Levofloxacin 750 mg Q 48 hours     Other:   Kidney function  Liver enzymes   GFR 18   Normal      Blood glucose pattern      Significant diabetes-related events over the past 24-72 hours    Assessment and Plan   Nursing Diagnosis Risk for unstable blood glucose pattern   Nursing Intervention Domain 5256 Decision-making Support   Nursing Interventions Examined current inpatient diabetes/blood glucose control   Explored factors facilitating and impeding inpatient management  Explored corrective strategies with patient and responsible inpatient provider   Informed patient of rational for insulin strategy while hospitalized     Evaluation   This 46year old patient with Type 1 diabetes did not achieve BG control prior to admission as evidenced by an A1c of 8.7%. Patient's A1c was much higher just one month ago prior to start of insulin pump therapy. Of concern, the patient had an admission glucose of 812. The patient was in DKA and was started on Glucostabilzer. He is now alert and off ventilator. In our discussion, he admits he did have some N/V prior to him \"falling out\". He has been on Lantus 16 units daily for the last 3 days, but his basal rate is the equivalent of 26 units per day. Sis Ayala He has received 22 units of correctional insulin in the last 24 hours. PTA, he was on a Tandem pump which was started 07/15/22 with a basal rate equated to 26 units daily. He is now eating and will likely require mealtime insulin coverage along with his basal dose to get him closer to his home dose.  Since the last hospitalization, he states he has been using a CGM for blood glucose monitoring. He states it is a Dexcom system, but unsure if this is accurate. He states he has an upcoming appointment with Dr. Kaleb ePtty. Some info copied from [address] [facility] note. 8/16/22 The patient's BG went to 73 yesterday afternoon on 16 units Lantus. His BG was 197 this morning. The patient may need an increase in basal dose, however from past hospitalizations, it is noted that the patient's BG's are labile. Therefore, a slow increase is recommended. Consider 22 units Lantus daily. Also consider decreasing meal time to 4 units Humalog with each meal.   8/17/22 The patient had a BG of 67 yesterday evening on 32 units Lantus. His dose was reduced to 20 units Lantus today. I agree with strategy. However, consider reducing meal time insulin dose to 4 units Humalog. The patient reports the desire to resume his insulin pump at discharge. I would like ensure the pump is on and working. The patient does not have his insulin pump  or supplies at this time. He reportedly will have a family member bring it. I will check back tomorrow. 8/18/22 The patient is now restarted on his Tandem insulin pump. He has a new insertion set, tubing and insulin vial. His insulin pump is charged and is working properly. Nursing notified. The hospitalist notified. Diabetes management will continue to monitor BG trends. 8/19/22 The patient is using his own insulin pump with good results. He remains alert and oriented. We will continue to monitor his BG trends. Recommendations     Continue on Tandem inulin pump.     Billing Code(s)   [] [id number] IP subsequent hospital care - 35 minutes [] 15524 Prolonged Services - 65 minutes [] 24787 Prolonged Services - 110 minutes  [] [id number] IP subsequent hospital care - 25 minutes [] 14144 Prolonged Services - 55 minutes [] 15956 Prolonged Services - 100 minutes  [x] 60389 IP subsequent hospital care - 15 minutes [] 79303 Prolonged Services - 45 minutes [] [id number] Prolonged Services - 90 minutes    Before making these care recommendations, I personally reviewed the hospitalization record, including notes, laboratory & diagnostic data and current medications, and examined the patient at the bedside (circumstances permitting) before making care recommendations. More than fifty (50) percent of the time was spent in patient counseling and/or care coordination.   Total minutes: 15 minutes    ANEESH Sweeney  Diabetes Clinical Nurse Specialist  [facility]  Access via [employer]

## 2022-08-19 NOTE — PROGRESS NOTES
Problem: Self Care Deficits Care Plan (Adult)  Goal: *Acute Goals and Plan of Care (Insert Text)  Description: FUNCTIONAL STATUS PRIOR TO ADMISSION: Patient was independent and active without use of DME. He recently received an order for a rollator but has not gotten one. His mother helps to hold his pump when he showers. He has not worked since last year and is in process of filing disability. HOME SUPPORT: The patient lived with mother and uncle and required assistance with showering and IADL tasks. Occupational Therapy Goals  Initiated 8/17/2022  1. Patient will perform grooming standing at sink with supervision/set-up within 7 day(s). 2.  Patient will perform lower body dressing with supervision/set-up within 7 day(s). 3.  Patient will perform toilet transfers with supervision/set-up within 7 day(s). 5.  Patient will perform all aspects of toileting with supervision/set-up within 7 day(s). 6.  Patient will participate in upper extremity therapeutic exercise/activities with supervision/set-up for 5 minutes within 7 day(s). 7.  Patient will utilize energy conservation techniques during functional activities with verbal cues within 7 day(s). Outcome: Progressing Towards Goal     OCCUPATIONAL THERAPY TREATMENT  Patient: Gutierrez Mayo (84 y.o. male)  Date: 8/19/2022  Diagnosis: [facility] [E11.10] <principal problem not specified>      Precautions:    Chart, occupational therapy assessment, plan of care, and goals were reviewed. ASSESSMENT  Patient continues with skilled OT services and is progressing towards goals. Pt continues to be limited by pain and discomfort of scrotum. Pt participated well in session. Pt performed bed mobilities with mod(I) and sit<>stand transfers with SBA-CGA. Pt declined grooming tasks in standing at sink s/s scrotal pain however was amenable to grooming task in sitting. Completed hand washing/grooming with mod(I)-SBA and min verbal cueing. Pt would benefit from continued OT for return to his functional baseline. Current Level of Function Impacting Discharge (ADLs): CGA overall for ADLs. Pt is limited by his pain otherwise he would likely be independent with all ADLs/IADLs. Other factors to consider for discharge:          PLAN :  Patient continues to benefit from skilled intervention to address the above impairments. Continue treatment per established plan of care to address goals. Recommendation for discharge: (in order for the patient to meet his/her long term goals)  No skilled occupational therapy/ follow up rehabilitation needs identified at this time. This discharge recommendation:  Has been made in collaboration with the attending provider and/or case management    IF patient discharges home will need the following DME: none       SUBJECTIVE:   Patient stated I can sit in the chair for lunch.     OBJECTIVE DATA SUMMARY:   Cognitive/Behavioral Status:  Neurologic State: Alert  Orientation Level: Oriented X4  Cognition: Follows commands; Appropriate decision making  Perception: Appears intact  Perseveration: No perseveration noted  Safety/Judgement: Awareness of environment    Functional Mobility and Transfers for ADLs:  Bed Mobility:  Rolling: Modified independent  Supine to Sit: Modified independent  Scooting: Modified independent    Transfers:  Sit to Stand: Stand-by assistance  Functional Transfers  Bathroom Mobility: Contact guard assistance    Balance:  Sitting: Intact  Standing: Impaired (limited by scrotal pain)  Standing - Static: Good  Standing - Dynamic : Good    ADL Intervention:  Grooming  Grooming Assistance: Set-up; Stand-by assistance  Position Performed: Seated in chair  Washing Hands: Modified independent; Set-up    [address]: Contact guard assistance    Cognitive Retraining  Safety/Judgement: Awareness of environment    Therapeutic Exercises:   Participated in BUE strengthening/ROM exercises     Pain:  Scrotal pain, RN aware    Activity Tolerance:   Fair    After treatment patient left in no apparent distress:   Sitting in chair and Call bell within reach    COMMUNICATION/COLLABORATION:   The patients plan of care was discussed with: Physical therapist and Registered nurse.      Eva Gillespie OT  Time Calculation: 8 mins

## 2022-08-19 NOTE — PROGRESS NOTES
Problem: Mobility Impaired (Adult and Pediatric)  Goal: *Acute Goals and Plan of Care (Insert Text)  Description: FUNCTIONAL STATUS PRIOR TO ADMISSION: Patient was independent without use of DME. Pt reports \"trying to get on disability. \"  Pt does not work currently. HOME SUPPORT PRIOR TO ADMISSION: The patient lived with family (Mom and Uncle) and required assistance for showering (\"to hold my pump\") but otherwise mod. I, no device use. Physical Therapy Goals  Initiated 8/17/2022  1. Patient will move from supine to sit and sit to supine , scoot up and down, and roll side to side in bed with modified independence within 7 day(s). 2.  Patient will transfer from bed to chair and chair to bed with modified independence using the least restrictive device within 7 day(s). 3.  Patient will perform sit to stand with modified independence within 7 day(s). 4.  Patient will ambulate with modified independence for 100 feet with the least restrictive device within 7 day(s). Outcome: Progressing Towards Goal   PHYSICAL THERAPY TREATMENT  Patient: Emily Hopper (52 y.o. male)  Date: 8/19/2022  Diagnosis: [facility]diabetic ketoacidosis) [facility] [E11.10] <principal problem not specified>      Precautions:    Chart, physical therapy assessment, plan of care and goals were reviewed. ASSESSMENT  Patient continues with skilled PT services and is progressing towards goals. Pt was received in supine and agreeable to participate with therapy services. Pt activity tolerance is grossly limited due to scrotal swelling and increased pain levels with mobility. Pt overall moving well with therapy standby assist. Pt reports initially feeling unsteady upon standing with no assistive device and with a very wide base of support to alleviate scrotal pain. Pt was provided a RW and able to ambulate 30ft with RW standby assist with no loss of balance.  Pt was left seated in chair deferring further mobility due to scrotal pain and left with all needs met. Pt likely able to improve in activity tolerance pending pain management. Continue to recommend HHPT upon discharge. Current Level of Function Impacting Discharge (mobility/balance): supervision w/bed mobility, SBA w/transfers, SBA w/RW for gait    Other factors to consider for discharge: scrotal swelling, pain management, supportive family         PLAN :  Patient continues to benefit from skilled intervention to address the above impairments. Continue treatment per established plan of care. to address goals. Recommendation for discharge: (in order for the patient to meet his/her long term goals)  Physical therapy at least 2 days/week in the home AND ensure assist and/or supervision for safety with mobility & ADL's    This discharge recommendation:  Has been made in collaboration with the attending provider and/or case management    IF patient discharges home will need the following DME: reports getting a rollator today       SUBJECTIVE:   Patient stated sure I can do a little bit, it just hurts a lot .     OBJECTIVE DATA SUMMARY:   Critical Behavior:  Neurologic State: Alert  Orientation Level: Oriented X4  Cognition: Appropriate decision making, Appropriate for age attention/concentration, Appropriate safety awareness, Follows commands     Functional Mobility Training:  Bed Mobility:  Rolling: Supervision  Supine to Sit: Supervision     Scooting: Supervision    Transfers:  Sit to Stand: Stand-by assistance  Stand to Sit: Stand-by assistance    Balance:  Sitting: Intact  Standing: Impaired; Without support  Standing - Static: Good;Constant support (RW)  Standing - Dynamic : Good;Constant support (RW)  Ambulation/Gait Training:  Distance (ft): 30 Feet (ft)  Assistive Device: Gait belt;Walker, rolling  Ambulation - Level of Assistance: Stand-by assistance    Gait Abnormalities: Decreased step clearance;Shuffling gait    Base of Support: Widened     Speed/Ana: Pace decreased (<100 feet/min)  Step Length: Left shortened;Right shortened    Pain Rating:  Pt did not quantify however reports increased pain in scrotal area with mobility    Activity Tolerance:   Fair    After treatment patient left in no apparent distress:   Sitting in chair and Call bell within reach    COMMUNICATION/COLLABORATION:   The patients plan of care was discussed with: Occupational therapist and Registered nurse.      Timoteo Swain PTA   Time Calculation: 9 mins

## 2022-08-19 NOTE — PROGRESS NOTES
Hospitalist Progress Note    NAME: Topher Roa   :  1980   MRN:  [id number]       Assessment / Plan:  47 y/o male history of poorly controlled T1DM, bipolar disorder, and CKD 3 admitted  for acute hypoxic respiratory failure in the setting of severe diabetic ketoacidosis. EMS called for generalized lethargy and increased work of breathing. Had DKA on presentation, was obtunded, got intubated  and extubated . Acute respiratory failure requiring intubation   Community acquired pneumonia POA  Acute pulmonary edema  Anasarca  Urinary retention  Hematuria, from hansen trauma  Intubated on , likely d/t respiratory failure from acidosis  Extubated on   Completed a course of levaquin  CXR showing acute pulmonary edema, bilateral pleural effusions  Bumex increased to 4mg BID, IV albumin  Failed voiding trial.  Developed hematuria from traumatic hansen re-insertion. Urology is consulted  [address] nephrology following, discussed with Dr Nuria Snyder, can likely discharge this weekend    FELECIA on CKD Stage 4  Creatinine is stable  No urgent need for HD. He is making good UO  C/w PO bicarb  Nephrology following    DKA   T1DM  DKA resolved. Off insulin gtt, transitioned to to lantus but now is back on insulin pump. Appreciate diabetic team following. Outpt f/u with Dr Jude Ordoñez    Elevated troponin  Hypertensive urgency  Bradycardia  Believed to be demand ischemia  Adjust antihypertensives, adding hydralazine today for better BP control. Increase clonidine dose  Cardiology signed off  ECHO normal ef. Normal diastolic fxn, small pericardial effusion    Scrotal swelling  Could be d/t fluid overload from CKD  Also has some pain  Scrotal sono: shows superficial soft tissue edema  Cont' elevation. Bumex as above    Slurred speech on   ? laceration on posterior scalp  Doesn't remember having any trauma  CT head was done for ?slurred speech, negative  Seems at baseline now    Estimated discharge date: 8/20-21  Barriers: Scrotal swelling/ pain/ anasarca/ PT/OT consult    Code status: Full  Prophylaxis: Lovenox  Recommended Disposition: Home w/Family     Subjective:     Chief Complaint / Reason for Physician Visit  Follow up for DKA  No new complaint other than scrotal edema    Review of Systems:  Symptom Y/N Comments  Symptom Y/N Comments   Fever/Chills n   Chest Pain n    Poor Appetite n   Edema n    Cough    Abdominal Pain     Sputum    Joint Pain     SOB/JOHNSTON    Pruritis/Rash     Nausea/vomit    Tolerating PT/OT     Diarrhea    Tolerating Diet     Constipation    Other x Swelling, pain     Could NOT obtain due to:      Objective:     VITALS:   Last 24hrs VS reviewed since prior progress note. Most recent are:  Patient Vitals for the past 24 hrs:   Temp Pulse Resp BP SpO2   08/19/22 1114 97.8 °F (36.6 °C) 89 18 (!) 163/85 100 %   08/19/22 0855 97.8 °F (36.6 °C) 87 18 (!) 190/84 94 %   08/19/22 0320 97.9 °F (36.6 °C) 89 18 (!) 155/75 92 %   08/18/22 2315 98.7 °F (37.1 °C) 93 16 (!) 156/77 91 %   08/18/22 2121 98.1 °F (36.7 °C) 90 16 (!) 186/92 97 %   08/18/22 1910 98.7 °F (37.1 °C) 92 -- (!) 185/86 93 %   08/18/22 1752 -- 68 -- (!) 147/88 --   08/18/22 1554 97.9 °F (36.6 °C) 87 14 (!) 189/100 92 %         Intake/Output Summary (Last 24 hours) at 8/19/2022 1344  Last data filed at 8/18/2022 1902  Gross per 24 hour   Intake --   Output 1850 ml   Net -1850 ml          I had a face to face encounter and independently examined this patient on 8/19/2022, as outlined below:  PHYSICAL EXAM:  General: Awake, No acute distress  EENT:  EOMI. Anicteric sclerae. MMM  Resp:  CTA bilaterally, no wheezing or rales. No accessory muscle use  CV:  Regular  rhythm,  scrotal edema  GI:  Soft, Non distended, Non tender.   +Bowel sounds  : Scrotal edema and redness  Neurologic:  Alert and oriented X 3, normal speech,   Psych:   Not anxious nor agitated  Ext: B/l Le edema    Reviewed most current lab test results and cultures YES  Reviewed most current radiology test results   YES  Review and summation of old records today    NO  Reviewed patient's current orders and MAR    YES  PMH/SH reviewed - no change compared to H&P  ________________________________________________________________________  Care Plan discussed with:    Comments   Patient y    Family      RN y    Care Manager     Consultant                       y Multidiciplinary team rounds were held today with , nursing, pharmacist and clinical coordinator. Patient's plan of care was discussed; medications were reviewed and discharge planning was addressed. ________________________________________________________________________  Total NON critical care TIME:  36   Minutes    Total CRITICAL CARE TIME Spent:   Minutes non procedure based      Comments   >50% of visit spent in counseling and coordination of care     ________________________________________________________________________  Gee Torrez MD     Procedures: see electronic medical records for all procedures/Xrays and details which were not copied into this note but were reviewed prior to creation of Plan. LABS:  I reviewed today's most current labs and imaging studies.   Pertinent labs include:  Recent Labs     08/19/22  0642 08/17/22  0106   WBC 8.4 7.1   HGB 8.1* 9.4*   HCT 24.7* 28.3*    304       Recent Labs     08/19/22  0642 08/17/22  0106    135*   K 3.7 4.1    103   CO2 25 22   GLU 98 100   BUN 53* 61*   CREA 4.41* 4.51*   CA 8.9 8.5   MG  --  2.3   PHOS 5.6* 5.8*   ALB 2.6* 2.0*   TBILI  --  0.2   ALT  --  22         Signed: Gee Torrez MD

## 2022-08-19 NOTE — PROGRESS NOTES
This RN gave report to Woldme (oncoming RN). All questions answered. Pt had an uneventful night. Pt received his pain medications (both IV Morphine and PO Oxycodone. This AM at handoff, pt was awake and alert.

## 2022-08-20 VITALS
SYSTOLIC BLOOD PRESSURE: 144 MMHG | BODY MASS INDEX: 29.84 KG/M2 | HEIGHT: 69 IN | OXYGEN SATURATION: 94 % | DIASTOLIC BLOOD PRESSURE: 77 MMHG | TEMPERATURE: 98.1 F | RESPIRATION RATE: 16 BRPM | WEIGHT: 201.5 LBS | HEART RATE: 85 BPM

## 2022-08-20 LAB
ALBUMIN SERPL-MCNC: 2.4 G/DL (ref 3.5–5)
ANION GAP SERPL CALC-SCNC: 9 MMOL/L (ref 5–15)
BUN SERPL-MCNC: 55 MG/DL (ref 6–20)
BUN/CREAT SERPL: 12 (ref 12–20)
CALCIUM SERPL-MCNC: 9 MG/DL (ref 8.5–10.1)
CHLORIDE SERPL-SCNC: 99 MMOL/L (ref 97–108)
CO2 SERPL-SCNC: 28 MMOL/L (ref 21–32)
CREAT SERPL-MCNC: 4.57 MG/DL (ref 0.7–1.3)
GLUCOSE BLD STRIP.AUTO-MCNC: 107 MG/DL (ref 65–117)
GLUCOSE BLD STRIP.AUTO-MCNC: 89 MG/DL (ref 65–117)
GLUCOSE SERPL-MCNC: 106 MG/DL (ref 65–100)
PHOSPHATE SERPL-MCNC: 5.5 MG/DL (ref 2.6–4.7)
POTASSIUM SERPL-SCNC: 3.6 MMOL/L (ref 3.5–5.1)
SERVICE CMNT-IMP: NORMAL
SERVICE CMNT-IMP: NORMAL
SODIUM SERPL-SCNC: 136 MMOL/L (ref 136–145)

## 2022-08-20 PROCEDURE — 82962 GLUCOSE BLOOD TEST: CPT

## 2022-08-20 PROCEDURE — 94760 N-INVAS EAR/PLS OXIMETRY 1: CPT

## 2022-08-20 PROCEDURE — 74011250637 HC RX REV CODE- 250/637: Performed by: NURSE PRACTITIONER

## 2022-08-20 PROCEDURE — 36415 COLL VENOUS BLD VENIPUNCTURE: CPT

## 2022-08-20 PROCEDURE — 74011250637 HC RX REV CODE- 250/637: Performed by: INTERNAL MEDICINE

## 2022-08-20 PROCEDURE — 74011250637 HC RX REV CODE- 250/637: Performed by: HOSPITALIST

## 2022-08-20 PROCEDURE — 80069 RENAL FUNCTION PANEL: CPT

## 2022-08-20 PROCEDURE — 74011250637 HC RX REV CODE- 250/637: Performed by: STUDENT IN AN ORGANIZED HEALTH CARE EDUCATION/TRAINING PROGRAM

## 2022-08-20 PROCEDURE — 74011000250 HC RX REV CODE- 250: Performed by: NURSE PRACTITIONER

## 2022-08-20 PROCEDURE — 74011000250 HC RX REV CODE- 250: Performed by: INTERNAL MEDICINE

## 2022-08-20 PROCEDURE — 74011250636 HC RX REV CODE- 250/636: Performed by: INTERNAL MEDICINE

## 2022-08-20 RX ORDER — SODIUM BICARBONATE 650 MG/1
650 TABLET ORAL 3 TIMES DAILY
Qty: 90 TABLET | Refills: 0 | Status: SHIPPED | OUTPATIENT
Start: 2022-08-20 | End: 2022-09-19

## 2022-08-20 RX ORDER — PANTOPRAZOLE SODIUM 40 MG/1
40 TABLET, DELAYED RELEASE ORAL
Qty: 30 TABLET | Refills: 0 | Status: SHIPPED | OUTPATIENT
Start: 2022-08-21

## 2022-08-20 RX ORDER — HYDRALAZINE HYDROCHLORIDE 100 MG/1
100 TABLET, FILM COATED ORAL 3 TIMES DAILY
Qty: 90 TABLET | Refills: 0 | Status: SHIPPED | OUTPATIENT
Start: 2022-08-20 | End: 2022-09-19

## 2022-08-20 RX ORDER — FUROSEMIDE 80 MG/1
80 TABLET ORAL 2 TIMES DAILY
Qty: 60 TABLET | Refills: 0 | Status: SHIPPED | OUTPATIENT
Start: 2022-08-20 | End: 2022-09-19

## 2022-08-20 RX ADMIN — MORPHINE SULFATE 1 MG: 2 INJECTION, SOLUTION INTRAMUSCULAR; INTRAVENOUS at 11:16

## 2022-08-20 RX ADMIN — SENNOSIDES AND DOCUSATE SODIUM 1 TABLET: 50; 8.6 TABLET ORAL at 08:45

## 2022-08-20 RX ADMIN — Medication 1 AMPULE: at 08:54

## 2022-08-20 RX ADMIN — SODIUM BICARBONATE 650 MG: 650 TABLET ORAL at 08:45

## 2022-08-20 RX ADMIN — CASTOR OIL AND BALSAM, PERU: 788; 87 OINTMENT TOPICAL at 08:54

## 2022-08-20 RX ADMIN — MORPHINE SULFATE 1 MG: 2 INJECTION, SOLUTION INTRAMUSCULAR; INTRAVENOUS at 05:30

## 2022-08-20 RX ADMIN — CARVEDILOL 25 MG: 12.5 TABLET, FILM COATED ORAL at 08:45

## 2022-08-20 RX ADMIN — BUMETANIDE 4 MG: 0.25 INJECTION INTRAMUSCULAR; INTRAVENOUS at 08:45

## 2022-08-20 RX ADMIN — SODIUM CHLORIDE, PRESERVATIVE FREE 10 ML: 5 INJECTION INTRAVENOUS at 05:30

## 2022-08-20 RX ADMIN — ASPIRIN 81 MG: 81 TABLET, COATED ORAL at 08:45

## 2022-08-20 RX ADMIN — HYDRALAZINE HYDROCHLORIDE 100 MG: 50 TABLET, FILM COATED ORAL at 08:44

## 2022-08-20 RX ADMIN — AMLODIPINE BESYLATE 5 MG: 5 TABLET ORAL at 08:45

## 2022-08-20 RX ADMIN — OXYCODONE 5 MG: 5 TABLET ORAL at 08:45

## 2022-08-20 RX ADMIN — PANTOPRAZOLE SODIUM 40 MG: 40 TABLET, DELAYED RELEASE ORAL at 08:45

## 2022-08-20 NOTE — PROGRESS NOTES
Nephrology Progress Note  [facility] / [address], [place]da Dancer [place], [address]  Phone - [phone]  Fax - [phone]                 Patient: Petey Kee                   YOB: 1980        Date- 8/20/2022                      Admit Date: 8/11/2022  CC: Follow up for acute kidney injury         IMPRESSION & PLAN:   Acute kidney injury on CKD stage IV  CKD stage IV(baseline creatinine 2.8-3.5)(secondary to uncontrolled diabetes)  edema  Non-anion gap metabolic acidosis  Diabetic ketoacidosis  Type 1 diabetes uncontrolled  Hyperphosphatemia  Elevated troponins  Community-acquired pneumonia  Acute hypoxic respiratory failure on mechanical ventilation    PLAN-  Cr stable, good UOP  Cont IV diuretics  NO RRT indicated  If he is discharged over the weekend he will go home on Lasix 80 mg twice a day  Keep hansen in place  Daily labs and I/Os while here     Subjective: Interval History:   Seen and examined. Resting in bed. On RA. No cp, sob. Stable UOP and renal function. Objective:   Vitals:    08/19/22 1611 08/19/22 2001 08/19/22 2301 08/20/22 0240   BP: (!) 159/82 (!) 146/75 (!) 156/74 (!) 151/73   Pulse: 87 95 91 90   Resp: 18 18 14 14   Temp: 98 °F (36.7 °C) 98.3 °F (36.8 °C) 98.5 °F (36.9 °C) 99.2 °F (37.3 °C)   SpO2: 94% 95% 96% 96%   Weight:       Height:          08/19 0701 - 08/20 0700  In: -   Out: 4400 [Urine:4400]  Last 3 Recorded Weights in this Encounter    08/14/22 0500 08/15/22 0343 08/17/22 0058   Weight: 89.1 kg (196 lb 6.9 oz) 91 kg (200 lb 9.9 oz) 91.4 kg (201 lb 8 oz)      Physical exam:    GEN: NAD  NECK:  Supple, no thyromegaly  RESP: Clear bilaterally, no  wheezing,   CVS: RRR,S1,S2   NEURO: non focal, normal speech  EXT: Edema +nt     Hansen +  Scrotal edema +    Chart reviewed. Pertinent Notes reviewed.      Data Review :  Recent Labs     08/20/22  0515 08/19/22  0642   NA 136 136   K 3.6 3.7   CL 99 101   CO2 28 25   BUN 55* 53*   CREA 4.57* 4.41*   * 98   CA 9.0 8.9   PHOS 5.5* 5.6*       Recent Labs     08/19/22  0642   WBC 8.4   HGB 8.1*   HCT 24.7*          No results for input(s): FE, TIBC, PSAT, FERR in the last 72 hours.    Medication list  reviewed  Current Facility-Administered Medications   Medication Dose Route Frequency    insulin pump (PATIENT SUPPLIED)   SubCUTAneous PRN    cloNIDine (CATAPRES) 0.2 mg/24 hr patch 1 Patch  1 Patch TransDERmal Q7D    hydrALAZINE (APRESOLINE) tablet 100 mg  100 mg Oral TID    bumetanide (BUMEX) injection 4 mg  4 mg IntraVENous BID    amLODIPine (NORVASC) tablet 5 mg  5 mg Oral DAILY    morphine injection 1 mg  1 mg IntraVENous Q4H PRN    oxymetazoline (AFRIN) 0.05 % nasal spray 2 Spray  2 Spray Both Nostrils BID PRN    carvediloL (COREG) tablet 25 mg  25 mg Oral BID WITH MEALS    oxyCODONE IR (ROXICODONE) tablet 5 mg  5 mg Oral Q6H PRN    sodium chloride (OCEAN) 0.65 % nasal squeeze bottle 2 Spray  2 Spray Both Nostrils Q2H PRN    albuterol-ipratropium (DUO-NEB) 2.5 MG-0.5 MG/3 ML  3 mL Nebulization Q4H PRN    pantoprazole (PROTONIX) tablet 40 mg  40 mg Oral ACB    [Held by provider] heparin (porcine) injection 5,000 Units  5,000 Units SubCUTAneous Q8H    rosuvastatin (CRESTOR) tablet 10 mg  10 mg Oral QHS    aspirin delayed-release tablet 81 mg  81 mg Oral DAILY    labetaloL (NORMODYNE;TRANDATE) injection 20 mg  20 mg IntraVENous Q4H PRN    hydrALAZINE (APRESOLINE) 20 mg/mL injection 20 mg  20 mg IntraVENous Q6H PRN    sodium bicarbonate tablet 650 mg  650 mg Oral TID    glucose chewable tablet 16 g  4 Tablet Oral PRN    glucagon (GLUCAGEN) injection 1 mg  1 mg IntraMUSCular PRN    dextrose 10% infusion 0-250 mL  0-250 mL IntraVENous PRN    senna-docusate (PERICOLACE) 8.6-50 mg per tablet 1 Tablet  1 Tablet Oral DAILY    sodium chloride (NS) flush 5-40 mL  5-40 mL IntraVENous Q8H    sodium chloride (NS) flush 5-40 mL  5-40 mL IntraVENous PRN    acetaminophen (TYLENOL) tablet 650 mg  650 mg Oral Q6H PRN    Or    acetaminophen (TYLENOL) suppository 650 mg  650 mg Rectal Q6H PRN    polyethylene glycol (MIRALAX) packet 17 g  17 g Oral DAILY PRN    ondansetron (ZOFRAN) injection 4 mg  4 mg IntraVENous Q6H PRN    chlorhexidine (ORAL CARE KIT) 0.12 % mouthwash 15 mL  15 mL Oral Q12H    0.9% sodium chloride infusion 250 mL  250 mL IntraVENous PRN    balsam peru-castor oiL (VENELEX) ointment   Topical Q12H    alcohol 62% (NOZIN) nasal  1 Ampule  1 Ampule Topical Q12H          Amparo Kee MD  8/20/2022

## 2022-08-20 NOTE — PROGRESS NOTES
Transition of Care Plan:    RUR: 34%  Disposition: Home with [address], [facility] has accepted and they are aware he is being discharged home today, 8/20/22  Follow up appointments: To be scheduled by patient   DME needed: The patient and his mother confirmed that a rollator was delivered to the home yesterday, 8/19/22  Transportation at Discharge: Yes, the patient's mother is at bedside and will be transporting him home   [facility] or means to access home: Yes      IM Medicare Letter: N/A  Is patient a  and connected with the [facility]? N/A               If yes, was [place] transfer form completed and [facility] notified? Caregiver Contact: González Pleitez, Mother, Phone: [phone]  Discharge Caregiver contacted prior to discharge? Yes, CM spoke to the patient's mother at [facility] needed?: No    CM was alerted that the patient is being discharged home today, 8/20/22. [facility] and CM informed them that the patient is being discharged home today, 8/20/22. They will be following the patient. CM confirmed with the patient and his mother that a rollator was delivered to their home yesterday, 8/19/22. The patient's mother will be transporting him home. 2nd IM letter is not needed. From CM perspective, the patient can be discharged.      [address] [facility]

## 2022-08-20 NOTE — DISCHARGE SUMMARY
Discharge Summary      Name: Soledad Dior  [id number]  YOB: 1980 (Age: 46 y.o.)   Date of Admission: 8/11/2022  Date of Discharge: 8/20/2022  Attending Physician: Ian Reynolds MD    Discharge Diagnosis:   Acute respiratory failure requiring intubation   Community acquired pneumonia POA  Acute pulmonary edema  Anasarca  Urinary retention  Hematuria  FELECIA on CKD Stage 4   DKA   Elevated troponin  Hypertensive urgency  Bradycardia      Consultations:  IP CONSULT TO NEPHROLOGY  IP CONSULT TO UROLOGY  IP CONSULT TO CARDIOLOGY    Brief Admission History/Reason for Admission Per Km Aguilar MD:   45 y/o male history of poorly controlled T1DM, bipolar disorder, and CKD 3 admitted 8/12 for acute hypoxic respiratory failure in the setting of severe diabetic ketoacidosis. EMS called for generalized lethargy and increased work of breathing. Glucose > 600, VBG: PH 6.83,, 2 L NS administered, initially was maintaining his airway, then proceeded to have minimal respirations, obtunded, placed on mechanical ventilation. Insulin infusion initiated for DKA, antibiotics d/t concern for PNA. Brief Hospital Course by Main Problems:   Acute respiratory failure requiring intubation   Community acquired pneumonia POA  Acute pulmonary edema  Anasarca  Urinary retention  Hematuria, from hansen trauma, resolved  Intubated on 8/12, likely d/t respiratory failure from acidosis  Extubated on 8/13  Completed a course of levaquin  CXR showing acute pulmonary edema, bilateral pleural effusions  Bumex increased to 4mg BID, IV albumin  Failed voiding trial.  Developed hematuria from traumatic hansen re-insertion. Urology evaluated, recommend keep hansen for bladder decompression and out fu with urology for VT as scheduled. Appreciate nephrology input. Outpt f/u Dr Barbara Espinoza. FELECIA on CKD Stage 4  Creatinine is stable  No urgent need for HD. He is making good UO     DKA   T1DM  DKA resolved.   Off insulin gtt, transitioned to to lantus but now is back on insulin pump. Appreciate diabetic team input  Outpt f/u with Dr Hermelinda Starkey     Elevated troponin  Hypertensive urgency  Bradycardia  Believed to be demand ischemia  Cont' antihypertensives as prescribed. ECHO normal ef. Normal diastolic fxn, small pericardial effusion    Scrotal swelling  Could be d/t fluid overload from CKD  Also has some pain  Scrotal sono: shows superficial soft tissue edema  Cont' elevation. Bumex as above     Slurred speech on 8/14  ? laceration on posterior scalp  Doesn't remember having any trauma  CT head was done for slurred speech, negative  mentation and speech at baseline now. Discharge Exam:  Patient seen and examined by me on discharge day. Pertinent Findings:  Visit Vitals  BP (!) 144/77   Pulse 85   Temp 98.1 °F (36.7 °C)   Resp 16   Ht 5' 9\" (1.753 m)   Wt 91.4 kg (201 lb 8 oz)   SpO2 94%   BMI 29.76 kg/m²     Gen:    Not in distress  Chest: Clear lungs  CVS:   Regular rhythm. No edema  Abd:  Soft, not distended, not tender    Discharge/Recent Laboratory Results:  Recent Labs     08/20/22  0515      K 3.6   CL 99   CO2 28   BUN 55*   *   CA 9.0   PHOS 5.5*     Recent Labs     08/19/22  0642   HGB 8.1*   HCT 24.7*   WBC 8.4          Discharge Medications:  Current Discharge Medication List        START taking these medications    Details   sodium bicarbonate 650 mg tablet Take 1 Tablet by mouth three (3) times daily for 30 days. Qty: 90 Tablet, Refills: 0  Start date: 8/20/2022, End date: 9/19/2022      hydrALAZINE (APRESOLINE) 100 mg tablet Take 1 Tablet by mouth three (3) times daily for 30 days. Qty: 90 Tablet, Refills: 0  Start date: 8/20/2022, End date: 9/19/2022           CONTINUE these medications which have CHANGED    Details   pantoprazole (PROTONIX) 40 mg tablet Take 1 Tablet by mouth Daily (before breakfast).   Qty: 30 Tablet, Refills: 0  Start date: 8/21/2022      furosemide (Lasix) 80 mg tablet Take 1 Tablet by mouth two (2) times a day for 30 days. Qty: 60 Tablet, Refills: 0  Start date: 8/20/2022, End date: 9/19/2022           CONTINUE these medications which have NOT CHANGED    Details   finasteride (PROSCAR) 5 mg tablet Take 1 Tablet by mouth in the morning. Qty: 30 Tablet, Refills: 2      insulin aspart U-100 (NovoLOG U-100 Insulin aspart) 100 unit/mL injection Use as instructed via insulin pump. Dx code E10.65 max daily dose 50U  Qty: 30 mL, Refills: 2      rosuvastatin (CRESTOR) 40 mg tablet       pregabalin (Lyrica) 75 mg capsule Take 1 Capsule by mouth two (2) times a day. Max Daily Amount: 150 mg.  Qty: 60 Capsule, Refills: 2    Associated Diagnoses: Other diabetic neurological complication associated with type 2 diabetes mellitus ([facility] [address] Recurrent falls      Walker (Ultra-Light Rollator) misc Use while ambulating  Qty: 1 Each, Refills: 0    Associated Diagnoses: DM type 2, goal HbA1c < 7% ([facility] [address] Recurrent falls      amLODIPine (NORVASC) 10 mg tablet Take 1 Tablet by mouth daily. Qty: 90 Tablet, Refills: 1    Associated Diagnoses: Primary hypertension      cloNIDine (CATAPRES) 0.1 mg/24 hr ptwk 1 Patch by TransDERmal route every seven (7) days.   Qty: 12 Patch, Refills: 1    Associated Diagnoses: Primary hypertension      Dexcom G6  misc Use with the dexcom device to check blood sugars 4 times a day  Qty: 1 Each, Refills: 0    Comments: [phone] dx code E10.65      DULoxetine (CYMBALTA) 60 mg capsule Take 1 capsule by mouth once daily  Qty: 90 Capsule, Refills: 0    Associated Diagnoses: Other diabetic neurological complication associated with type 1 diabetes mellitus ([facility] [address]      Dexcom G6 Sensor brandi Use as directed every 10 days  Qty: 10 Each, Refills: 11    Comments: [phone] dx code E10.65      Dexcom G6 Transmitter brandi Use as directed  Qty: 10 Each, Refills: 3    Comments: [phone] dx code E10.65      FreeStyle Connor 14 Day Sensor kit Use as directed every 14 days  Qty: 2 Kit, Refills: 2      Insulin Needles, Disposable, 31 gauge x 5/16\" ndle Dx code E11.65, use 6x daily  Qty: 200 Each, Refills: 11      insulin glargine (LANTUS) 100 unit/mL injection 16 Units by SubCUTAneous route daily. Qty: 1 mL, Refills: 0      insulin aspart U-100 (NovoLOG Flexpen U-100 Insulin) 100 unit/mL (3 mL) inpn Take 1 unit for every 15 grams of carbohydrates, 1 unit for every 50 points >150. Max daily dose 25U. Qty: 15 mL, Refills: 3      carvediloL (COREG) 12.5 mg tablet Take 1 Tablet by mouth two (2) times daily (with meals). Qty: 60 Tablet, Refills: 1      tamsulosin (FLOMAX) 0.4 mg capsule Take 0.4 mg by mouth daily. pen needle, diabetic 30 gauge x 3/16\" ndle 5 Units by Does Not Apply route Before breakfast, lunch, and dinner.   Qty: 100 Each, Refills: 3    Associated Diagnoses: DM type 2, goal HbA1c < 7% ([facility])           STOP taking these medications       ondansetron (ZOFRAN ODT) 4 mg disintegrating tablet Comments:   Reason for Stopping:         acetaminophen (TYLENOL) 325 mg tablet Comments:   Reason for Stopping:         ferrous sulfate 325 mg (65 mg iron) tablet Comments:   Reason for Stopping:         ergocalciferol (ERGOCALCIFEROL) 1,250 mcg (50,000 unit) capsule Comments:   Reason for Stopping:                   DISPOSITION:    Home with Family: x   Home with HH/PT/OT/RN:    SNF/LTC:    CATHIE:    OTHER:          Follow up with:   PCP : Ian Beltrán MD  Follow-up Information       Follow up With Specialties Details Why [address]   Follow up  [address] [place] [address] 1361 [id number] Urology  Follow up on 9/6/2022 follow up on this date at our [facility] office with Dr. Kenna Schwab at 8:50 am [address] Moses MD Internal Medicine Physician   [facility] [address]  [phone]                Total time in minutes spent coordinating this discharge (includes going over instructions, follow-up, prescriptions, and preparing report for sign off to her PCP) :  35 minutes

## 2022-08-22 ENCOUNTER — PATIENT OUTREACH (OUTPATIENT)
Dept: CASE MANAGEMENT | Age: 42
End: 2022-08-22

## 2022-08-22 NOTE — ACP (ADVANCE CARE PLANNING)
Advance Care Planning     General Advance Care Planning (ACP) Conversation      Date of Conversation: 8/22/2022  Conducted with: Patient with Decision Making Capacity    Healthcare Decision Maker:     Primary Decision Maker: Opal Coleman - Mother - [phone]  Click here to complete [address] including selection of the Healthcare Decision Maker Relationship (ie \"Primary\")        Content/Action Overview:   DECLINED ACP conversation - will revisit periodically   Reviewed DNR/DNI and patient elects Full Code (Attempt Resuscitation)         Length of Voluntary ACP Conversation in minutes:  <16 minutes (Non-Billable)    Nicole Ch RN

## 2022-08-23 ENCOUNTER — TELEPHONE (OUTPATIENT)
Dept: ENDOCRINOLOGY | Age: 42
End: 2022-08-23

## 2022-08-23 ENCOUNTER — DOCUMENTATION ONLY (OUTPATIENT)
Dept: ENDOCRINOLOGY | Age: 42
End: 2022-08-23

## 2022-08-23 DIAGNOSIS — E10.65 HYPERGLYCEMIA DUE TO TYPE 1 DIABETES MELLITUS (HCC): Primary | ICD-10-CM

## 2022-08-23 RX ORDER — INSULIN GLARGINE 100 [IU]/ML
16 INJECTION, SOLUTION SUBCUTANEOUS AS NEEDED
Qty: 15 ML | Refills: 0 | Status: SHIPPED | OUTPATIENT
Start: 2022-08-23 | End: 2022-09-28

## 2022-08-23 NOTE — TELEPHONE ENCOUNTER
Pt stated that is using the tandem and after reinserting the infusion set, his number went from HI to 517. Pt stated that if his number jump back up, he will go to the ER.

## 2022-08-23 NOTE — TELEPHONE ENCOUNTER
Deanne from 's home physical therapy called and stated that pt's bs was 500 with his CGM. She then asked him to rechecked it using a finger prick and at that point, it just read \"HI\". I then encouraged to call 911. I called back 30 mins later and spoke with pt asking how was his bs. He stated that they were still reading \"HI\" and that they were still waiting on the EMS to arrive. I asked if he was wearing his pump and if so, was it a medtronic pump and he said yes to both questions. I then stated that sometimes with the medtonic pumps the cords tend to have a  glitch which keep the insulin from flowing. He was then asked if was willing to removed the cord to make sure his insertion was not bent. Pt was also told that if removed the infusion cord, he will then have to replace the infusion cord with a new one, and he stated that he was ok with doing that. Once removed, he saw that the insertion tip was bent and that none of his insulin was passing through. While he was was changing his set, EMS arrived. I then stated that I will give him a call back within a couple of hours.

## 2022-08-23 NOTE — PROGRESS NOTES
Care Transitions Initial Call    Call within 2 business days of discharge: Yes     Patient: Yessica Langston Patient : 1980 MRN: [id number]    Last Discharge [address]       Date Complaint Diagnosis Description Type Department Provider    22 High Blood Sugar Diabetic ketoacidosis with coma associated with type 1 diabetes mellitus ([facility] [address] . .. ED to Hosp-Admission (Discharged) (ADMIT) Christa Cook MD; Karin Johnson... Was this an external facility discharge? No Discharge Facility: [facility]    Challenges to be reviewed by the provider   Additional needs identified to be addressed with provider: yes  home health care- [facility] to start on 22  medications- sodium bicarb started, ckd stage IV  Recently intubated for dka,   patient has dexacom and insulin pump. Has appt for hospital follow up on m         Method of communication with provider : chart routing    Discussed COVID-19 related testing which was available at this time. Test results were negative. Patient informed of results, if available? yes     Advance Care Planning:   Does patient have an Advance Directive: not on file, patient declined education. Inpatient Readmission Risk score: Unplanned Readmit Risk Score: 34.8    Was this a readmission?  no   Patient stated reason for the admission: dka    Patients top risk factors for readmission: functional physical ability, lack of knowledge about disease, medical condition-diabetes, medication management, multiple health system providers, polypharmacy, support system, transportation, and utilization of services   Interventions to address risk factors: Scheduled appointment with PCP-22, Scheduled appointment with [address] [place] [address] with urology, and endo 10/28/22, Obtained and reviewed discharge summary and/or continuity of care documents, Communication with home health agencies or other community services the patient is currently using-[facility], and Assistance in accessing community resources-moms meals, meals on wheels application, number ofr senior connections and lets go ride services given to patient    Care Transition Nurse (CTN) contacted the patient by telephone to perform post hospital discharge assessment. Verified name and  with patient as identifiers. Provided introduction to self, and explanation of the CTN role. CTN reviewed discharge instructions, medical action plan and red flags with patient who verbalized understanding. Were discharge instructions available to patient? yes. Reviewed appropriate site of care based on symptoms and resources available to patient including: PCP, Specialist, and Home Health. Patient given an opportunity to ask questions and does not have any further questions or concerns at this time. The patient agrees to contact the PCP office for questions related to their healthcare. Medication reconciliation was performed with patient, who verbalizes understanding of administration of home medications. Advised obtaining a 90-day supply of all daily and as-needed medications. Referral to Pharm D needed: no     Home Health/Outpatient orders at discharge: home health care and [address]: [facility]  Date of initial visit: 22    Durable Medical Equipment ordered at discharge: Cane/Walker/Crutches  Durable Medical Equipment received: rollator    Was patient discharged with a pulse oximeter? no    Discussed follow-up appointments. If no appointment was previously scheduled, appointment scheduling offered: yes. Is follow up appointment scheduled within 7 days of discharge? yes.    [facility] follow up appointment(s):   Future Appointments   Date Time Provider Caryl Foley   2022 11:00 AM Driss Oneal MD [facility] BS AMB   10/28/2022 10:30 AM Maria Del Carmen Adler MD RDHODAN [facility] BS AMB     Non-[facility] follow up appointment(s): urology on 22    Plan for follow-up call in 5-7 days based on severity of symptoms and risk factors. Plan for next call: follow up appointment-8/25/22 pcp, moms meals coming, contacted ride services, blood sugar readings, hansen care and urine monitoring  CTN provided contact information for future needs. Goals Addressed                   This Visit's Progress     Prevent complications post hospitalization. 8/23/22    Patient to attend pcp appt on 8/25/22  Patient to use dexacom and pump to control blood sugars and check bs 5 times daily with meals, fasting in the am and before bed. Ctn contacted mom's meals and restarted meal delivery for patient. Ctn gave patient number for senior connections rides and lets go service for transportation needs. [facility] follow patient and soc on 8/23/22  Patient with hansen and instructed wash daily with soap and water, empty bag daily and monitor urine color and clarity  Patient to add sodium bicarb tablets and take as ordered. Ctn to follow up in one week.   Glynn Franklin RN

## 2022-08-23 NOTE — PROGRESS NOTES
Spoke with pt, bg in 300s now  Referral to diabetes educator placed  Order for lantus and ketone test strips placed  Insertion site was dislodged - reviewed importance of rotating sites, can use alternative sites    Josette Abreu [facility]

## 2022-08-24 NOTE — TELEPHONE ENCOUNTER
Spoke with pt and he stated that he did not go to the ER yesterday simply because his bs went from 517 to 430 an hour after talking to me and it continued to drop to the normal range for the remainder of the evening. This morning his bs was 109 and it is currently 162.

## 2022-08-25 ENCOUNTER — OFFICE VISIT (OUTPATIENT)
Dept: PRIMARY CARE CLINIC | Age: 42
End: 2022-08-25
Payer: MEDICAID

## 2022-08-25 VITALS
DIASTOLIC BLOOD PRESSURE: 76 MMHG | WEIGHT: 183.4 LBS | OXYGEN SATURATION: 96 % | BODY MASS INDEX: 27.16 KG/M2 | RESPIRATION RATE: 18 BRPM | HEIGHT: 69 IN | SYSTOLIC BLOOD PRESSURE: 120 MMHG | TEMPERATURE: 97.8 F | HEART RATE: 80 BPM

## 2022-08-25 DIAGNOSIS — M54.50 CHRONIC MIDLINE LOW BACK PAIN WITHOUT SCIATICA: ICD-10-CM

## 2022-08-25 DIAGNOSIS — E11.9 DM TYPE 2, GOAL HBA1C < 7% (HCC): Primary | ICD-10-CM

## 2022-08-25 DIAGNOSIS — E78.00 HYPERCHOLESTEREMIA: ICD-10-CM

## 2022-08-25 DIAGNOSIS — K25.9 GASTRIC ULCER WITHOUT HEMORRHAGE OR PERFORATION, UNSPECIFIED CHRONICITY: ICD-10-CM

## 2022-08-25 DIAGNOSIS — S00.01XA ABRASION OF SCALP, INITIAL ENCOUNTER: ICD-10-CM

## 2022-08-25 DIAGNOSIS — E11.319 DIABETIC RETINOPATHY ASSOCIATED WITH TYPE 2 DIABETES MELLITUS, MACULAR EDEMA PRESENCE UNSPECIFIED, UNSPECIFIED LATERALITY, UNSPECIFIED RETINOPATHY SEVERITY (HCC): ICD-10-CM

## 2022-08-25 DIAGNOSIS — R29.6 RECURRENT FALLS: ICD-10-CM

## 2022-08-25 DIAGNOSIS — I10 PRIMARY HYPERTENSION: ICD-10-CM

## 2022-08-25 DIAGNOSIS — G89.29 CHRONIC MIDLINE LOW BACK PAIN WITHOUT SCIATICA: ICD-10-CM

## 2022-08-25 DIAGNOSIS — N18.9 CHRONIC KIDNEY DISEASE, UNSPECIFIED CKD STAGE: ICD-10-CM

## 2022-08-25 PROCEDURE — 99215 OFFICE O/P EST HI 40 MIN: CPT | Performed by: INTERNAL MEDICINE

## 2022-08-25 RX ORDER — BACITRACIN ZINC 500 UNIT/G
OINTMENT (GRAM) TOPICAL 2 TIMES DAILY
Qty: 28 G | Refills: 4 | Status: SHIPPED | OUTPATIENT
Start: 2022-08-25

## 2022-08-25 RX ORDER — ACETAMINOPHEN AND CODEINE PHOSPHATE 300; 30 MG/1; MG/1
1 TABLET ORAL
Qty: 15 TABLET | Refills: 0 | Status: SHIPPED | OUTPATIENT
Start: 2022-08-25 | End: 2022-09-13 | Stop reason: SDUPTHER

## 2022-08-25 NOTE — PROGRESS NOTES
Abhinav Thompson is a 46 y.o.  male and presents with     Chief Complaint   Patient presents with    Follow-up     Knot on head-  had for about a week now    Fall     Transition of care  Date of Admission: 8/11/2022  Date of Discharge: 8/20/2022  Reason for admission - leg swelling  Dsicharge diagnosis - acute respitory failure, DKA ,renal failure, urinary retention. Pt was admitetd to hospital with leg swelling. Was found to have community acquired pneumonia, DKA, respitory failure, urinary retention, acute on chronic kidney failure   Was seen by ENdocrinology, cardiologist, Nephrologisst , endocrinology. CXR showed bilateral pleural effusions  Pt has  appt with Urology for indwelling hansen catheter  Pt has a bump on his head. Pt is having headaches. BUmex was increased. Pt has neuropathy in his feet and is currently on Lyrica. Pt sees Ortho for back pain. Was told pinched nerve. Pt has noticed as small bruised spot in his calp on the left side. Does not remember if he had any injuries.         Past Medical History:   Diagnosis Date    Bipolar 1 disorder, depressed ([address]     Bipolar disorder ([address]     Chronic kidney disease, stage 3a ([address]     Depression     Diabetes ([address]     DKA, type 1 ([address] 1/27/2013    diagnosed age 23    DKA, type 1 ([address]     H/O noncompliance with medical treatment, presenting hazards to health     MRSA (methicillin resistant staph aureus) culture positive     MRSA (methicillin resistant Staphylococcus aureus)     Face    Noncompliance with medication regimen     Second hand smoke exposure     Seizure ([address]     Seizures ([address] 2006 or 2007    one episode during [facility]     Past Surgical History:   Procedure Laterality Date    HX HEENT      top left wisdom tooth    HX ORTHOPAEDIC Left     wrist; [facility]    UPPER GI ENDOSCOPY,BIOPSY  11/20/2018          Current Outpatient Medications   Medication Sig    acetaminophen-codeine (Tylenol-Codeine #3) 300-30 mg per tablet Take 1 Tablet by mouth every six (6) hours as needed for Pain for up to 30 days. Max Daily Amount: 4 Tablets. bacitracin zinc (BACITRACIN) ointment Apply  to affected area two (2) times a day. insulin glargine (Lantus Solostar U-100 Insulin) 100 unit/mL (3 mL) inpn 16 Units by SubCUTAneous route as needed (pump malfunction). Ketone Blood Test strp 50 Each by Does Not Apply route as needed for PRN Reason (Other) (as needed for suspected dka). pantoprazole (PROTONIX) 40 mg tablet Take 1 Tablet by mouth Daily (before breakfast). sodium bicarbonate 650 mg tablet Take 1 Tablet by mouth three (3) times daily for 30 days. furosemide (Lasix) 80 mg tablet Take 1 Tablet by mouth two (2) times a day for 30 days. hydrALAZINE (APRESOLINE) 100 mg tablet Take 1 Tablet by mouth three (3) times daily for 30 days. finasteride (PROSCAR) 5 mg tablet Take 1 Tablet by mouth in the morning. insulin aspart U-100 (NovoLOG U-100 Insulin aspart) 100 unit/mL injection Use as instructed via insulin pump. Dx code E10.65 max daily dose 50U    rosuvastatin (CRESTOR) 40 mg tablet     pregabalin (Lyrica) 75 mg capsule Take 1 Capsule by mouth two (2) times a day. Max Daily Amount: 150 mg. Walker (Ultra-Light Rollator) misc Use while ambulating    amLODIPine (NORVASC) 10 mg tablet Take 1 Tablet by mouth daily. cloNIDine (CATAPRES) 0.1 mg/24 hr ptwk 1 Patch by TransDERmal route every seven (7) days. Dexcom G6  misc Use with the dexcom device to check blood sugars 4 times a day    DULoxetine (CYMBALTA) 60 mg capsule Take 1 capsule by mouth once daily    Dexcom G6 Sensor brandi Use as directed every 10 days    Dexcom G6 Transmitter brandi Use as directed    Insulin Needles, Disposable, 31 gauge x 5/16\" ndle Dx code E11.65, use 6x daily    insulin glargine (LANTUS) 100 unit/mL injection 16 Units by SubCUTAneous route daily.     insulin aspart U-100 (NovoLOG Flexpen U-100 Insulin) 100 unit/mL (3 mL) inpn Take 1 unit for every 15 grams of carbohydrates, 1 unit for every 50 points >150. Max daily dose 25U.    carvediloL (COREG) 12.5 mg tablet Take 1 Tablet by mouth two (2) times daily (with meals). tamsulosin (FLOMAX) 0.4 mg capsule Take 0.4 mg by mouth daily. pen needle, diabetic 30 gauge x 3/16\" ndle 5 Units by Does Not Apply route Before breakfast, lunch, and dinner. FreeStyle Connor 14 Day Sensor kit Use as directed every 14 days (Patient not taking: No sig reported)     No current facility-administered medications for this visit. Health Maintenance   Topic Date Due    Pneumococcal 0-64 years (2 - PCV) 08/18/2012    Foot Exam Q1  03/07/2021    COVID-19 Vaccine (3 - Booster for Moderna series) 03/13/2022    MICROALBUMIN Q1  04/01/2022    Flu Vaccine (1) 09/01/2022    Depression Screen  02/24/2023    Lipid Screen  04/01/2023    A1C test (Diabetic or Prediabetic)  08/12/2023    Eye Exam Retinal or Dilated  08/01/2024    DTaP/Tdap/Td series (2 - Td or Tdap) 03/09/2028    Hepatitis C Screening  Completed     Immunization History   Administered Date(s) Administered    (RETIRED) Pneumococcal Vaccine (Unspecified Type) 08/18/2011    COVID-19, MODERNA BLUE border, Primary or Immunocompromised, (age 18y+), IM, 100 mcg/0.5mL 09/11/2021, 10/13/2021    MMR 03/23/1995    Pneumococcal Polysaccharide (PPSV-23) 08/18/2011    Rabies Vaccine IM 09/17/1992    TD Vaccine 03/08/2011    Tdap 03/09/2018     No LMP for male patient. Allergies and Intolerances:   No Known Allergies    Family History:   Family History   Problem Relation Age of Onset    Diabetes Mother     Diabetes Other         neice, type 1        Social History:   He  reports that he quit smoking about 2 years ago. His smoking use included cigarettes. He started smoking about 22 years ago. He has a 1.60 pack-year smoking history. He has never used smokeless tobacco.  He  reports no history of alcohol use.             Review of Systems:   General: negative for - chills, fatigue, fever, weight change  Psych: negative for - anxiety, depression, irritability or mood swings  ENT: negative for - headaches, hearing change, nasal congestion, oral lesions, sneezing or sore throat  Heme/ Lymph: negative for - bleeding problems, bruising, pallor or swollen lymph nodes  Endo: negative for - hot flashes, polydipsia/polyuria or temperature intolerance  Resp: negative for - cough, shortness of breath or wheezing  CV: negative for - chest pain, edema or palpitations  GI: negative for - abdominal pain, change in bowel habits, constipation, diarrhea or nausea/vomiting  : negative for - dysuria, hematuria, incontinence, pelvic pain or vulvar/vaginal symptoms  MSK: negative for - joint pain, joint swelling or muscle pain  Neuro: negative for - confusion, headaches, seizures or weakness  Derm: negative for - dry skin, hair changes, rash or skin lesion changes          Physical:   Vitals:   Vitals:    08/25/22 1054   BP: 120/76   Pulse: 80   Resp: 18   Temp: 97.8 °F (36.6 °C)   SpO2: 96%   Weight: 183 lb 6.4 oz (83.2 kg)   Height: 5' 9\" (1.753 m)           Exam:   HEENT- atraumatic,normocephalic, awake, oriented, well nourished  Neck - supple,no enlarged lymph nodes, no JVD, no thyromegaly  Chest- CTA, no rhonchi, no crackles  Heart- rrr, no murmurs / gallop/rub  Abdomen- soft,BS+,NT, no hepatosplenomegaly  Ext - no c/c/edema   Neuro- no focal deficits. Power 5/5 all extremities  Skin - warm,dry, no obvious rashes.   Urinary cathter in place        Review of Data:   LABS:   Lab Results   Component Value Date/Time    WBC 8.4 08/19/2022 06:42 AM    HGB 8.1 (L) 08/19/2022 06:42 AM    HCT 24.7 (L) 08/19/2022 06:42 AM    PLATELET 028 83/40/4592 06:42 AM     Lab Results   Component Value Date/Time    Sodium 136 08/20/2022 05:15 AM    Potassium 3.6 08/20/2022 05:15 AM    Chloride 99 08/20/2022 05:15 AM    CO2 28 08/20/2022 05:15 AM    Glucose 106 (H) 08/20/2022 05:15 AM    BUN 55 (H) 08/20/2022 05:15 AM    Creatinine 4.57 (H) 08/20/2022 05:15 AM     Lab Results   Component Value Date/Time    Cholesterol, total 192 11/15/2021 12:01 PM    HDL Cholesterol 61 11/15/2021 12:01 PM    LDL, calculated 89.4 11/15/2021 12:01 PM    Triglyceride 208 (H) 11/15/2021 12:01 PM     No components found for: GPT        Impression / Plan:        ICD-10-CM ICD-9-CM    1. DM type 2, goal HbA1c < 7% (HCC)  E11.9 250.00       2. Hypercholesteremia  E78.00 272.0       3. Gastric ulcer without hemorrhage or perforation, unspecified chronicity  K25.9 531.90       4. Recurrent falls  R29.6 V15.88       5. Diabetic retinopathy associated with type 2 diabetes mellitus, macular edema presence unspecified, unspecified laterality, unspecified retinopathy severity ([facility] [address]  E11.319 250.50      362.01       6. Chronic kidney disease, unspecified CKD stage  N18.9 585.9       7. Primary hypertension  I10 401.9       8. Chronic midline low back pain without sciatica  M54.50 724.2 REFERRAL TO PAIN MANAGEMENT    G89.29 338.29 acetaminophen-codeine (Tylenol-Codeine #3) 300-30 mg per tablet      9. Abrasion of scalp, initial encounter  S00. 01XA 910.0 bacitracin zinc (BACITRACIN) ointment        Follow up with Nephrology, Cardiology , Urology. Pulmonary. And endocrinology    Pt will need to be in hemodiaylis in the near future to prevent fluid overload. Informed pt that given his multiple co morbid conditions he should be disabled. Would be happy to do any paperwork. Explained to patient risk benefits of the medications. Advised patient to stop meds if having any side effects. Pt verbalized understanding of the instructions. I have discussed the diagnosis with the patient and the intended plan as seen in the above orders. The patient has received an after-visit summary and questions were answered concerning future plans. I have discussed medication side effects and warnings with the patient as well.  I have reviewed the plan of care with the patient, accepted their input and they are in agreement with the treatment goals. Reviewed plan of care. Patient has provided input and agrees with goals. Follow-up and Dispositions    Return in about 6 weeks (around 10/6/2022).          Amos Benites MD

## 2022-08-25 NOTE — PROGRESS NOTES
Chief Complaint   Patient presents with    Follow-up     Knot on head-  had for about a week now    Fall       Visit Vitals  /76 (BP 1 Location: Left upper arm)   Pulse 80   Temp 97.8 °F (36.6 °C)   Resp 18   Ht 5' 9\" (1.753 m)   Wt 183 lb 6.4 oz (83.2 kg)   SpO2 96%   BMI 27.08 kg/m²       1. Have you been to the ER, urgent care clinic since your last visit? Hospitalized since your last visit? Yes [facility]    2. Have you seen or consulted any other health care providers outside of the [address] [place] [address] since your last visit? Include any pap smears or colon screening.  No

## 2022-09-06 ENCOUNTER — HOSPITAL ENCOUNTER (INPATIENT)
Age: 42
LOS: 6 days | Discharge: HOME | DRG: 470 | End: 2022-09-12
Attending: EMERGENCY MEDICINE | Admitting: INTERNAL MEDICINE
Payer: COMMERCIAL

## 2022-09-06 ENCOUNTER — APPOINTMENT (OUTPATIENT)
Dept: GENERAL RADIOLOGY | Age: 42
DRG: 470 | End: 2022-09-06
Attending: EMERGENCY MEDICINE
Payer: COMMERCIAL

## 2022-09-06 ENCOUNTER — APPOINTMENT (OUTPATIENT)
Dept: CT IMAGING | Age: 42
DRG: 470 | End: 2022-09-06
Attending: EMERGENCY MEDICINE
Payer: COMMERCIAL

## 2022-09-06 DIAGNOSIS — J96.01 ACUTE RESPIRATORY FAILURE WITH HYPOXIA (HCC): ICD-10-CM

## 2022-09-06 DIAGNOSIS — J90 PLEURAL EFFUSION: Primary | ICD-10-CM

## 2022-09-06 DIAGNOSIS — R07.81 PLEURITIC PAIN: ICD-10-CM

## 2022-09-06 DIAGNOSIS — J81.0 ACUTE PULMONARY EDEMA (HCC): ICD-10-CM

## 2022-09-06 PROBLEM — J96.90 RESPIRATORY FAILURE (HCC): Status: ACTIVE | Noted: 2022-09-06

## 2022-09-06 PROBLEM — J96.90 RESPIRATORY FAILURE (HCC): Status: ACTIVE | Noted: 2022-01-01

## 2022-09-06 LAB
ALBUMIN SERPL-MCNC: 2.7 G/DL (ref 3.5–5)
ALBUMIN/GLOB SERPL: 0.7 {RATIO} (ref 1.1–2.2)
ALP SERPL-CCNC: 155 U/L (ref 45–117)
ALT SERPL-CCNC: 47 U/L (ref 12–78)
ANION GAP SERPL CALC-SCNC: 10 MMOL/L (ref 5–15)
AST SERPL-CCNC: 56 U/L (ref 15–37)
ATRIAL RATE: 83 BPM
BASOPHILS # BLD: 0.1 K/UL (ref 0–0.1)
BASOPHILS NFR BLD: 1 % (ref 0–1)
BILIRUB SERPL-MCNC: 0.3 MG/DL (ref 0.2–1)
BNP SERPL-MCNC: 5688 PG/ML
BUN SERPL-MCNC: 69 MG/DL (ref 6–20)
BUN/CREAT SERPL: 12 (ref 12–20)
CALCIUM SERPL-MCNC: 8.6 MG/DL (ref 8.5–10.1)
CALCULATED P AXIS, ECG09: 53 DEGREES
CALCULATED R AXIS, ECG10: 23 DEGREES
CALCULATED T AXIS, ECG11: 57 DEGREES
CHLORIDE SERPL-SCNC: 100 MMOL/L (ref 97–108)
CO2 SERPL-SCNC: 26 MMOL/L (ref 21–32)
COMMENT, HOLDF: NORMAL
CREAT SERPL-MCNC: 5.56 MG/DL (ref 0.7–1.3)
D DIMER PPP FEU-MCNC: 1.91 MG/L FEU (ref 0–0.65)
DIAGNOSIS, 93000: NORMAL
DIFFERENTIAL METHOD BLD: ABNORMAL
EOSINOPHIL # BLD: 0.5 K/UL (ref 0–0.4)
EOSINOPHIL NFR BLD: 4 % (ref 0–7)
ERYTHROCYTE [DISTWIDTH] IN BLOOD BY AUTOMATED COUNT: 14.7 % (ref 11.5–14.5)
GLOBULIN SER CALC-MCNC: 4.1 G/DL (ref 2–4)
GLUCOSE BLD STRIP.AUTO-MCNC: 115 MG/DL (ref 65–117)
GLUCOSE BLD STRIP.AUTO-MCNC: 118 MG/DL (ref 65–117)
GLUCOSE BLD STRIP.AUTO-MCNC: 44 MG/DL (ref 65–117)
GLUCOSE BLD STRIP.AUTO-MCNC: 45 MG/DL (ref 65–117)
GLUCOSE BLD STRIP.AUTO-MCNC: 67 MG/DL (ref 65–117)
GLUCOSE SERPL-MCNC: 148 MG/DL (ref 65–100)
HCT VFR BLD AUTO: 27.1 % (ref 36.6–50.3)
HGB BLD-MCNC: 8.9 G/DL (ref 12.1–17)
IMM GRANULOCYTES # BLD AUTO: 0.1 K/UL (ref 0–0.04)
IMM GRANULOCYTES NFR BLD AUTO: 1 % (ref 0–0.5)
LIPASE SERPL-CCNC: 68 U/L (ref 73–393)
LYMPHOCYTES # BLD: 1.3 K/UL (ref 0.8–3.5)
LYMPHOCYTES NFR BLD: 11 % (ref 12–49)
MCH RBC QN AUTO: 28.2 PG (ref 26–34)
MCHC RBC AUTO-ENTMCNC: 32.8 G/DL (ref 30–36.5)
MCV RBC AUTO: 85.8 FL (ref 80–99)
MONOCYTES # BLD: 0.8 K/UL (ref 0–1)
MONOCYTES NFR BLD: 7 % (ref 5–13)
NEUTS SEG # BLD: 9.1 K/UL (ref 1.8–8)
NEUTS SEG NFR BLD: 76 % (ref 32–75)
NRBC # BLD: 0 K/UL (ref 0–0.01)
NRBC BLD-RTO: 0 PER 100 WBC
P-R INTERVAL, ECG05: 180 MS
PLATELET # BLD AUTO: 245 K/UL (ref 150–400)
POTASSIUM SERPL-SCNC: 4.1 MMOL/L (ref 3.5–5.1)
PROT SERPL-MCNC: 6.8 G/DL (ref 6.4–8.2)
Q-T INTERVAL, ECG07: 410 MS
QRS DURATION, ECG06: 92 MS
QTC CALCULATION (BEZET), ECG08: 481 MS
RBC # BLD AUTO: 3.16 M/UL (ref 4.1–5.7)
RBC MORPH BLD: ABNORMAL
SAMPLES BEING HELD,HOLD: NORMAL
SERVICE CMNT-IMP: ABNORMAL
SERVICE CMNT-IMP: NORMAL
SERVICE CMNT-IMP: NORMAL
SODIUM SERPL-SCNC: 136 MMOL/L (ref 136–145)
TROPONIN-HIGH SENSITIVITY: 10 NG/L (ref 0–76)
TROPONIN-HIGH SENSITIVITY: 11 NG/L (ref 0–76)
VENTRICULAR RATE, ECG03: 83 BPM
WBC # BLD AUTO: 11.9 K/UL (ref 4.1–11.1)

## 2022-09-06 PROCEDURE — 96376 TX/PRO/DX INJ SAME DRUG ADON: CPT

## 2022-09-06 PROCEDURE — 74011250637 HC RX REV CODE- 250/637: Performed by: EMERGENCY MEDICINE

## 2022-09-06 PROCEDURE — 74011250636 HC RX REV CODE- 250/636: Performed by: INTERNAL MEDICINE

## 2022-09-06 PROCEDURE — 65270000029 HC RM PRIVATE

## 2022-09-06 PROCEDURE — 83690 ASSAY OF LIPASE: CPT

## 2022-09-06 PROCEDURE — 80053 COMPREHEN METABOLIC PANEL: CPT

## 2022-09-06 PROCEDURE — 96374 THER/PROPH/DIAG INJ IV PUSH: CPT

## 2022-09-06 PROCEDURE — 93005 ELECTROCARDIOGRAM TRACING: CPT

## 2022-09-06 PROCEDURE — 85025 COMPLETE CBC W/AUTO DIFF WBC: CPT

## 2022-09-06 PROCEDURE — 83880 ASSAY OF NATRIURETIC PEPTIDE: CPT

## 2022-09-06 PROCEDURE — 84484 ASSAY OF TROPONIN QUANT: CPT

## 2022-09-06 PROCEDURE — 85379 FIBRIN DEGRADATION QUANT: CPT

## 2022-09-06 PROCEDURE — 36415 COLL VENOUS BLD VENIPUNCTURE: CPT

## 2022-09-06 PROCEDURE — 74011250637 HC RX REV CODE- 250/637: Performed by: INTERNAL MEDICINE

## 2022-09-06 PROCEDURE — 74011000250 HC RX REV CODE- 250: Performed by: INTERNAL MEDICINE

## 2022-09-06 PROCEDURE — 96375 TX/PRO/DX INJ NEW DRUG ADDON: CPT

## 2022-09-06 PROCEDURE — 71250 CT THORAX DX C-: CPT

## 2022-09-06 PROCEDURE — 71045 X-RAY EXAM CHEST 1 VIEW: CPT

## 2022-09-06 PROCEDURE — 74176 CT ABD & PELVIS W/O CONTRAST: CPT

## 2022-09-06 PROCEDURE — 74011250636 HC RX REV CODE- 250/636: Performed by: EMERGENCY MEDICINE

## 2022-09-06 PROCEDURE — 82962 GLUCOSE BLOOD TEST: CPT

## 2022-09-06 PROCEDURE — 99285 EMERGENCY DEPT VISIT HI MDM: CPT

## 2022-09-06 RX ORDER — OXYCODONE AND ACETAMINOPHEN 5; 325 MG/1; MG/1
1 TABLET ORAL
Status: DISCONTINUED | OUTPATIENT
Start: 2022-09-06 | End: 2022-09-11

## 2022-09-06 RX ORDER — BUMETANIDE 0.25 MG/ML
2 INJECTION INTRAMUSCULAR; INTRAVENOUS EVERY 8 HOURS
Status: DISCONTINUED | OUTPATIENT
Start: 2022-09-07 | End: 2022-09-12

## 2022-09-06 RX ORDER — ONDANSETRON 2 MG/ML
4 INJECTION INTRAMUSCULAR; INTRAVENOUS
Status: DISCONTINUED | OUTPATIENT
Start: 2022-09-06 | End: 2022-09-12 | Stop reason: HOSPADM

## 2022-09-06 RX ORDER — CLONIDINE 0.1 MG/24H
1 PATCH, EXTENDED RELEASE TRANSDERMAL
Status: DISCONTINUED | OUTPATIENT
Start: 2022-09-12 | End: 2022-09-12

## 2022-09-06 RX ORDER — PANTOPRAZOLE SODIUM 40 MG/1
40 TABLET, DELAYED RELEASE ORAL
Status: DISCONTINUED | OUTPATIENT
Start: 2022-09-06 | End: 2022-09-06 | Stop reason: DRUGHIGH

## 2022-09-06 RX ORDER — DEXTROSE MONOHYDRATE 100 MG/ML
0-250 INJECTION, SOLUTION INTRAVENOUS AS NEEDED
Status: DISCONTINUED | OUTPATIENT
Start: 2022-09-06 | End: 2022-09-12 | Stop reason: HOSPADM

## 2022-09-06 RX ORDER — ROSUVASTATIN CALCIUM 10 MG/1
10 TABLET, COATED ORAL
Status: DISCONTINUED | OUTPATIENT
Start: 2022-09-06 | End: 2022-09-12 | Stop reason: HOSPADM

## 2022-09-06 RX ORDER — MORPHINE SULFATE 2 MG/ML
4 INJECTION, SOLUTION INTRAMUSCULAR; INTRAVENOUS ONCE
Status: COMPLETED | OUTPATIENT
Start: 2022-09-06 | End: 2022-09-06

## 2022-09-06 RX ORDER — PANTOPRAZOLE SODIUM 40 MG/1
40 TABLET, DELAYED RELEASE ORAL
Status: DISCONTINUED | OUTPATIENT
Start: 2022-09-07 | End: 2022-09-12 | Stop reason: HOSPADM

## 2022-09-06 RX ORDER — ONDANSETRON 2 MG/ML
4 INJECTION INTRAMUSCULAR; INTRAVENOUS ONCE
Status: COMPLETED | OUTPATIENT
Start: 2022-09-06 | End: 2022-09-06

## 2022-09-06 RX ORDER — FUROSEMIDE 40 MG/1
80 TABLET ORAL 2 TIMES DAILY
Status: DISCONTINUED | OUTPATIENT
Start: 2022-09-06 | End: 2022-09-06

## 2022-09-06 RX ORDER — HEPARIN SODIUM 5000 [USP'U]/ML
5000 INJECTION, SOLUTION INTRAVENOUS; SUBCUTANEOUS EVERY 12 HOURS
Status: DISCONTINUED | OUTPATIENT
Start: 2022-09-06 | End: 2022-09-12 | Stop reason: HOSPADM

## 2022-09-06 RX ORDER — TAMSULOSIN HYDROCHLORIDE 0.4 MG/1
0.4 CAPSULE ORAL DAILY
Status: DISCONTINUED | OUTPATIENT
Start: 2022-09-07 | End: 2022-09-12 | Stop reason: HOSPADM

## 2022-09-06 RX ORDER — ACETAMINOPHEN 500 MG
1000 TABLET ORAL ONCE
Status: COMPLETED | OUTPATIENT
Start: 2022-09-06 | End: 2022-09-06

## 2022-09-06 RX ORDER — INSULIN GLARGINE 100 [IU]/ML
15 INJECTION, SOLUTION SUBCUTANEOUS
Status: DISCONTINUED | OUTPATIENT
Start: 2022-09-06 | End: 2022-09-09

## 2022-09-06 RX ORDER — FUROSEMIDE 10 MG/ML
80 INJECTION INTRAMUSCULAR; INTRAVENOUS 2 TIMES DAILY
Status: DISCONTINUED | OUTPATIENT
Start: 2022-09-06 | End: 2022-09-06

## 2022-09-06 RX ORDER — MORPHINE SULFATE 2 MG/ML
6 INJECTION, SOLUTION INTRAMUSCULAR; INTRAVENOUS ONCE
Status: COMPLETED | OUTPATIENT
Start: 2022-09-06 | End: 2022-09-06

## 2022-09-06 RX ORDER — HYDRALAZINE HYDROCHLORIDE 50 MG/1
100 TABLET, FILM COATED ORAL 3 TIMES DAILY
Status: DISCONTINUED | OUTPATIENT
Start: 2022-09-06 | End: 2022-09-12 | Stop reason: HOSPADM

## 2022-09-06 RX ORDER — CLONIDINE 0.1 MG/24H
1 PATCH, EXTENDED RELEASE TRANSDERMAL
Status: DISCONTINUED | OUTPATIENT
Start: 2022-09-06 | End: 2022-09-06 | Stop reason: DRUGHIGH

## 2022-09-06 RX ORDER — CARVEDILOL 12.5 MG/1
12.5 TABLET ORAL 2 TIMES DAILY WITH MEALS
Status: DISCONTINUED | OUTPATIENT
Start: 2022-09-06 | End: 2022-09-12 | Stop reason: HOSPADM

## 2022-09-06 RX ORDER — MAGNESIUM SULFATE 100 %
4 CRYSTALS MISCELLANEOUS AS NEEDED
Status: DISCONTINUED | OUTPATIENT
Start: 2022-09-06 | End: 2022-09-12 | Stop reason: HOSPADM

## 2022-09-06 RX ORDER — INSULIN LISPRO 100 [IU]/ML
INJECTION, SOLUTION INTRAVENOUS; SUBCUTANEOUS
Status: DISCONTINUED | OUTPATIENT
Start: 2022-09-06 | End: 2022-09-09

## 2022-09-06 RX ORDER — FUROSEMIDE 10 MG/ML
80 INJECTION INTRAMUSCULAR; INTRAVENOUS
Status: COMPLETED | OUTPATIENT
Start: 2022-09-06 | End: 2022-09-06

## 2022-09-06 RX ORDER — BUMETANIDE 0.25 MG/ML
2 INJECTION INTRAMUSCULAR; INTRAVENOUS EVERY 8 HOURS
Status: DISCONTINUED | OUTPATIENT
Start: 2022-09-06 | End: 2022-09-06 | Stop reason: SDUPTHER

## 2022-09-06 RX ORDER — ACETAMINOPHEN 325 MG/1
650 TABLET ORAL
Status: DISCONTINUED | OUTPATIENT
Start: 2022-09-06 | End: 2022-09-11

## 2022-09-06 RX ORDER — AMLODIPINE BESYLATE 5 MG/1
10 TABLET ORAL DAILY
Status: DISCONTINUED | OUTPATIENT
Start: 2022-09-07 | End: 2022-09-12 | Stop reason: HOSPADM

## 2022-09-06 RX ADMIN — ONDANSETRON 4 MG: 2 INJECTION INTRAMUSCULAR; INTRAVENOUS at 09:41

## 2022-09-06 RX ADMIN — MORPHINE SULFATE 4 MG: 2 INJECTION, SOLUTION INTRAMUSCULAR; INTRAVENOUS at 09:40

## 2022-09-06 RX ADMIN — OXYCODONE AND ACETAMINOPHEN 1 TABLET: 5; 325 TABLET ORAL at 18:41

## 2022-09-06 RX ADMIN — ACETAMINOPHEN 1000 MG: 500 TABLET ORAL at 09:41

## 2022-09-06 RX ADMIN — HEPARIN SODIUM 5000 UNITS: 5000 INJECTION INTRAVENOUS; SUBCUTANEOUS at 18:41

## 2022-09-06 RX ADMIN — BUMETANIDE 2 MG: 0.25 INJECTION INTRAMUSCULAR; INTRAVENOUS at 18:41

## 2022-09-06 RX ADMIN — EPOETIN ALFA-EPBX 12000 UNITS: 10000 INJECTION, SOLUTION INTRAVENOUS; SUBCUTANEOUS at 22:33

## 2022-09-06 RX ADMIN — FUROSEMIDE 80 MG: 10 INJECTION, SOLUTION INTRAMUSCULAR; INTRAVENOUS at 10:56

## 2022-09-06 RX ADMIN — HYDRALAZINE HYDROCHLORIDE 100 MG: 50 TABLET, FILM COATED ORAL at 22:33

## 2022-09-06 RX ADMIN — CARVEDILOL 12.5 MG: 12.5 TABLET, FILM COATED ORAL at 18:42

## 2022-09-06 RX ADMIN — PREGABALIN 75 MG: 50 CAPSULE ORAL at 18:42

## 2022-09-06 RX ADMIN — ROSUVASTATIN CALCIUM 10 MG: 10 TABLET, FILM COATED ORAL at 22:33

## 2022-09-06 RX ADMIN — MORPHINE SULFATE 6 MG: 2 INJECTION, SOLUTION INTRAMUSCULAR; INTRAVENOUS at 11:50

## 2022-09-06 NOTE — ED PROVIDER NOTES
EMERGENCY DEPARTMENT HISTORY AND PHYSICAL EXAM      Date: 9/6/2022  Patient Name: Len Sal    History of Presenting Illness     Chief Complaint   Patient presents with    Shortness of Breath     Pt arrives via wheelchair, reports SOB starting this morning, reports R side abd pain and that \"it hurts to breathe\". Patient reports hx of DM, insulin pump,  this morning. Denies CP and N/V/D. History Provided By: Patient    HPI: Len Sal, 46 y.o. male with a past medical history significant for history of insulin-dependent diabetes, state, stage IV kidney disease, recent admission for pneumonia, medical problems as stated below presents to the ED with cc of complaint of severe left-sided chest pain that occurred upon wakening this morning. Patient also reports the pain was on the left side of his abdomen as well. It did not radiate but is worse with breathing and movement. He has never had the same pain before in the past.  He is having no cough or fevers. No vomiting or diarrhea or bloody stools. Patient does report he had a Beard catheter in place since he was discharged on August 20 from her hospital.  Reportedly patient was admitted for DKA and possible pneumonia. He was supposed to go to his urologist to have his catheter removed and strongly would like us to do this. No other associated symptoms. No other exacerbating or mounting factors. There are no other complaints, changes, or physical findings at this time. PCP: Jessica Peña MD    No current facility-administered medications on file prior to encounter. Current Outpatient Medications on File Prior to Encounter   Medication Sig Dispense Refill    acetaminophen-codeine (Tylenol-Codeine #3) 300-30 mg per tablet Take 1 Tablet by mouth every six (6) hours as needed for Pain for up to 30 days. Max Daily Amount: 4 Tablets.  15 Tablet 0    bacitracin zinc (BACITRACIN) ointment Apply  to affected area two (2) times a day. 28 g 4    insulin glargine (Lantus Solostar U-100 Insulin) 100 unit/mL (3 mL) inpn 16 Units by SubCUTAneous route as needed (pump malfunction). 15 mL 0    Ketone Blood Test strp 50 Each by Does Not Apply route as needed for PRN Reason (Other) (as needed for suspected dka). 50 Strip 3    pantoprazole (PROTONIX) 40 mg tablet Take 1 Tablet by mouth Daily (before breakfast). 30 Tablet 0    sodium bicarbonate 650 mg tablet Take 1 Tablet by mouth three (3) times daily for 30 days. 90 Tablet 0    furosemide (Lasix) 80 mg tablet Take 1 Tablet by mouth two (2) times a day for 30 days. 60 Tablet 0    hydrALAZINE (APRESOLINE) 100 mg tablet Take 1 Tablet by mouth three (3) times daily for 30 days. 90 Tablet 0    finasteride (PROSCAR) 5 mg tablet Take 1 Tablet by mouth in the morning. 30 Tablet 2    insulin aspart U-100 (NovoLOG U-100 Insulin aspart) 100 unit/mL injection Use as instructed via insulin pump. Dx code E10.65 max daily dose 50U 30 mL 2    rosuvastatin (CRESTOR) 40 mg tablet       pregabalin (Lyrica) 75 mg capsule Take 1 Capsule by mouth two (2) times a day. Max Daily Amount: 150 mg. 60 Capsule 2    Walker (Ultra-Light Rollator) misc Use while ambulating 1 Each 0    amLODIPine (NORVASC) 10 mg tablet Take 1 Tablet by mouth daily. 90 Tablet 1    cloNIDine (CATAPRES) 0.1 mg/24 hr ptwk 1 Patch by TransDERmal route every seven (7) days.  12 Patch 1    Dexcom G6  misc Use with the dexcom device to check blood sugars 4 times a day 1 Each 0    DULoxetine (CYMBALTA) 60 mg capsule Take 1 capsule by mouth once daily 90 Capsule 0    Dexcom G6 Sensor brandi Use as directed every 10 days 10 Each 11    Dexcom G6 Transmitter brandi Use as directed 10 Each 3    FreeStyle Connor 14 Day Sensor kit Use as directed every 14 days (Patient not taking: No sig reported) 2 Kit 2    Insulin Needles, Disposable, 31 gauge x 5/16\" ndle Dx code E11.65, use 6x daily 200 Each 11    insulin glargine (LANTUS) 100 unit/mL injection 16 Units by SubCUTAneous route daily. 1 mL 0    insulin aspart U-100 (NovoLOG Flexpen U-100 Insulin) 100 unit/mL (3 mL) inpn Take 1 unit for every 15 grams of carbohydrates, 1 unit for every 50 points >150. Max daily dose 25U. 15 mL 3    carvediloL (COREG) 12.5 mg tablet Take 1 Tablet by mouth two (2) times daily (with meals). 60 Tablet 1    tamsulosin (FLOMAX) 0.4 mg capsule Take 0.4 mg by mouth daily. pen needle, diabetic 30 gauge x 3/16\" ndle 5 Units by Does Not Apply route Before breakfast, lunch, and dinner.  100 Each 3       Past History     Past Medical History:  Past Medical History:   Diagnosis Date    Bipolar 1 disorder, depressed ([address]     Bipolar disorder ([address]     Chronic kidney disease, stage 3a ([address]     Depression     Diabetes ([address]     DKA, type 1 ([address] 2013    diagnosed age 23    DKA, type 1 ([address]     H/O noncompliance with medical treatment, presenting hazards to health     MRSA (methicillin resistant staph aureus) culture positive     MRSA (methicillin resistant Staphylococcus aureus)     Face    Noncompliance with medication regimen     Second hand smoke exposure     Seizure ([address]     Seizures ([address]  or     one episode during pen[facility]iary       Past Surgical History:  Past Surgical History:   Procedure Laterality Date    HX HEENT      top left wisdom tooth    HX ORTHOPAEDIC Left     wrist; [facility]    UPPER GI ENDOSCOPY,BIOPSY  2018            Family History:  Family History   Problem Relation Age of Onset    Diabetes Mother     Diabetes Other         neice, type 1        Social History:  Social History     Tobacco Use    Smoking status: Former     Packs/day: 0.10     Years: 16.00     Pack years: 1.60     Types: Cigarettes     Start date: 10/4/1999     Quit date: 2020     Years since quittin.5    Smokeless tobacco: Never   Vaping Use    Vaping Use: Never used   Substance Use Topics    Alcohol use: No    Drug use: No       Allergies:  No Known Allergies      Review of Systems   Review of Systems   Constitutional:  Negative for chills, diaphoresis, fatigue and fever. HENT:  Negative for ear pain and sore throat. Eyes:  Negative for pain and redness. Respiratory:  Negative for cough and shortness of breath. Cardiovascular:  Negative for chest pain and leg swelling. Gastrointestinal:  Negative for abdominal pain, diarrhea, nausea and vomiting. Endocrine: Negative for cold intolerance and heat intolerance. Genitourinary:  Negative for flank pain and hematuria. Musculoskeletal:  Negative for back pain and neck stiffness. Skin:  Negative for rash and wound. Neurological:  Negative for dizziness, syncope and headaches. All other systems reviewed and are negative. Physical Exam   Physical Exam  Vitals and nursing note reviewed. Constitutional:       General: He is in acute distress. Appearance: He is well-developed. He is not ill-appearing. Comments: Acute distress secondary to pain   HENT:      Head: Normocephalic and atraumatic. Mouth/Throat:      Pharynx: No oropharyngeal exudate. Eyes:      Conjunctiva/sclera: Conjunctivae normal.      Pupils: Pupils are equal, round, and reactive to light. Cardiovascular:      Rate and Rhythm: Normal rate and regular rhythm. Heart sounds: No murmur heard. Pulmonary:      Effort: Pulmonary effort is normal. No respiratory distress. Breath sounds: Examination of the right-lower field reveals rales. Examination of the left-lower field reveals rales. Rales present. No decreased breath sounds or wheezing. Abdominal:      General: Bowel sounds are normal. There is no distension. Palpations: Abdomen is soft. Tenderness: There is abdominal tenderness in the left upper quadrant and left lower quadrant. There is guarding. There is no right CVA tenderness, left CVA tenderness or rebound. Musculoskeletal:         General: No deformity. Normal range of motion. Cervical back: Normal range of motion. Skin:     General: Skin is warm and dry. Findings: No rash. Neurological:      Mental Status: He is alert and oriented to person, place, and time. Coordination: Coordination normal.   Psychiatric:         Behavior: Behavior normal.       Diagnostic Study Results     Labs -     Recent Results (from the past 24 hour(s))   EKG, 12 LEAD, INITIAL    Collection Time: 09/06/22  8:50 AM   Result Value Ref Range    Ventricular Rate 83 BPM    Atrial Rate 83 BPM    P-R Interval 180 ms    QRS Duration 92 ms    Q-T Interval 410 ms    QTC Calculation (Bezet) 481 ms    Calculated P Axis 53 degrees    Calculated R Axis 23 degrees    Calculated T Axis 57 degrees    Diagnosis       Normal sinus rhythm  When compared with ECG of 11-AUG-2022 23:19,  Vent. rate has increased BY  34 BPM  Questionable change in QRS axis  Confirmed by Garrett New P.ROYAL ([id number]) on 9/6/2022 9:05:32 AM     CBC WITH AUTOMATED DIFF    Collection Time: 09/06/22  9:16 AM   Result Value Ref Range    WBC 11.9 (H) 4.1 - 11.1 K/uL    RBC 3.16 (L) 4.10 - 5.70 M/uL    HGB 8.9 (L) 12.1 - 17.0 g/dL    HCT 27.1 (L) 36.6 - 50.3 %    MCV 85.8 80.0 - 99.0 FL    MCH 28.2 26.0 - 34.0 PG    MCHC 32.8 30.0 - 36.5 g/dL    RDW 14.7 (H) 11.5 - 14.5 %    PLATELET 618 106 - 917 K/uL    NRBC 0.0 0  WBC    ABSOLUTE NRBC 0.00 0.00 - 0.01 K/uL    NEUTROPHILS 76 (H) 32 - 75 %    LYMPHOCYTES 11 (L) 12 - 49 %    MONOCYTES 7 5 - 13 %    EOSINOPHILS 4 0 - 7 %    BASOPHILS 1 0 - 1 %    IMMATURE GRANULOCYTES 1 (H) 0.0 - 0.5 %    ABS. NEUTROPHILS 9.1 (H) 1.8 - 8.0 K/UL    ABS. LYMPHOCYTES 1.3 0.8 - 3.5 K/UL    ABS. MONOCYTES 0.8 0.0 - 1.0 K/UL    ABS. EOSINOPHILS 0.5 (H) 0.0 - 0.4 K/UL    ABS. BASOPHILS 0.1 0.0 - 0.1 K/UL    ABS. IMM.  GRANS. 0.1 (H) 0.00 - 0.04 K/UL    DF SMEAR SCANNED      RBC COMMENTS NORMOCYTIC, NORMOCHROMIC     METABOLIC PANEL, COMPREHENSIVE    Collection Time: 09/06/22  9:16 AM   Result Value Ref Range    Sodium 136 136 - 145 mmol/L    Potassium 4.1 3.5 - 5.1 mmol/L    Chloride 100 97 - 108 mmol/L    CO2 26 21 - 32 mmol/L    Anion gap 10 5 - 15 mmol/L    Glucose 148 (H) 65 - 100 mg/dL    BUN 69 (H) 6 - 20 MG/DL    Creatinine 5.56 (H) 0.70 - 1.30 MG/DL    BUN/Creatinine ratio 12 12 - 20      GFR est AA 14 (L) >60 ml/min/1.73m2    GFR est non-AA 11 (L) >60 ml/min/1.73m2    Calcium 8.6 8.5 - 10.1 MG/DL    Bilirubin, total 0.3 0.2 - 1.0 MG/DL    ALT (SGPT) 47 12 - 78 U/L    AST (SGOT) 56 (H) 15 - 37 U/L    Alk. phosphatase 155 (H) 45 - 117 U/L    Protein, total 6.8 6.4 - 8.2 g/dL    Albumin 2.7 (L) 3.5 - 5.0 g/dL    Globulin 4.1 (H) 2.0 - 4.0 g/dL    A-G Ratio 0.7 (L) 1.1 - 2.2     TROPONIN-HIGH SENSITIVITY    Collection Time: 09/06/22  9:16 AM   Result Value Ref Range    Troponin-High Sensitivity 11 0 - 76 ng/L   NT-PRO BNP    Collection Time: 09/06/22  9:16 AM   Result Value Ref Range    NT pro-BNP 5,688 (H) <125 PG/ML   D DIMER    Collection Time: 09/06/22  9:16 AM   Result Value Ref Range    D-dimer 1.91 (H) 0.00 - 0.65 mg/L FEU   LIPASE    Collection Time: 09/06/22  9:16 AM   Result Value Ref Range    Lipase 68 (L) 73 - 393 U/L   SAMPLES BEING HELD    Collection Time: 09/06/22  9:30 AM   Result Value Ref Range    SAMPLES BEING HELD YELLOW, GRAY     COMMENT        Add-on orders for these samples will be processed based on acceptable specimen integrity and analyte stability, which may vary by analyte.    TROPONIN-HIGH SENSITIVITY    Collection Time: 09/06/22 11:25 AM   Result Value Ref Range    Troponin-High Sensitivity 10 0 - 76 ng/L   EKG, 12 LEAD, SUBSEQUENT    Collection Time: 09/06/22 11:35 AM   Result Value Ref Range    Ventricular Rate 76 BPM    Atrial Rate 76 BPM    P-R Interval 170 ms    QRS Duration 98 ms    Q-T Interval 434 ms    QTC Calculation (Bezet) 488 ms    Calculated P Axis 49 degrees    Calculated R Axis 53 degrees    Calculated T Axis 64 degrees    Diagnosis       Normal sinus rhythm  Septal infarct , age undetermined  When compared with ECG of 06-SEP-2022 08:50,  Septal infarct is now present         Radiologic Studies -   CT ABD PELV WO CONT   Final Result      1. Moderate bilateral pleural effusions, larger on the left. 2. Mild interstitial pulmonary edema. Bibasilar assessment of atelectasis. 3. Beard catheter within urinary bladder. Moderate diffuse urinary bladder mural   thickening. No emphysematous changes. No ureterectasis or renal   pelvocaliectasis. CT CHEST WO CONT   Final Result      1. Moderate bilateral pleural effusions, larger on the left. 2. Mild interstitial pulmonary edema. Bibasilar assessment of atelectasis. 3. Beard catheter within urinary bladder. Moderate diffuse urinary bladder mural   thickening. No emphysematous changes. No ureterectasis or renal   pelvocaliectasis. XR CHEST PORT   Final Result   Mild-moderate interstitial pulmonary edema and small right pleural   effusion. CT Results  (Last 48 hours)                 09/06/22 1108  CT ABD PELV WO CONT Final result    Impression:      1. Moderate bilateral pleural effusions, larger on the left. 2. Mild interstitial pulmonary edema. Bibasilar assessment of atelectasis. 3. Beard catheter within urinary bladder. Moderate diffuse urinary bladder mural   thickening. No emphysematous changes. No ureterectasis or renal   pelvocaliectasis. Narrative:  EXAM:  CT CHEST WO CONT, CT ABD PELV WO CONT       INDICATION: Severe left flank pain, severe left-sided chest pain       COMPARISON: Radiograph of the chest from earlier, CT of abdomen and pelvis   5/3/2022 and 7/10/2022       CONTRAST: No oral or IV contrast. The absence of contrast material diminishes   the capacity of CT to evaluate the bowel, solid organs, vasculature. TECHNIQUE:    Following the uneventful intravenous administration of contrast, thin axial   images were obtained through the chest, abdomen and pelvis. Coronal and sagittal   reconstructions were generated.  CT dose reduction was achieved through use of a   standardized protocol tailored for this examination and automatic exposure   control for dose modulation. FINDINGS:        CHEST WALL: No mass. Bilateral gynecomastia. THYROID: No nodule. MEDIASTINUM: Small right paratracheal, aorticopulmonary and subcarinal lymph   nodes. KIN: No obvious mass. THORACIC AORTA: No dissection or aneurysm. MAIN PULMONARY ARTERY: Normal in caliber. TRACHEA/BRONCHI: Patent. ESOPHAGUS: No wall thickening or dilatation. HEART: Cardiac size upper limits of normal. Trace pericardial fluid. PLEURA: Moderate bilateral pleural effusions, larger on the left. LUNGS: Mild interstitial pulmonary edema with septal thickening. Subsegmental   atelectasis prominently shown in basilar lower lobes bilaterally with linear   atelectasis in right middle lobe and lingula also demonstrated. LIVER: No mass or biliary dilatation. GALLBLADDER: Unremarkable. SPLEEN: No mass. PANCREAS: No mass or ductal dilatation. ADRENALS: Unremarkable. KIDNEYS: No mass, calculus, or hydronephrosis. STOMACH: Unremarkable. SMALL BOWEL: No dilatation or wall thickening. COLON: No dilatation or wall thickening. APPENDIX: Unremarkable. PERITONEUM: No ascites or pneumoperitoneum. RETROPERITONEUM: No lymphadenopathy or aortic aneurysm. REPRODUCTIVE ORGANS: Those shown appear unremarkable. URINARY BLADDER: There is a Beard catheter within the urinary bladder which is   not distended. There is diffuse mural thickening of urinary bladder. A small   focus of nondependent intraluminal air is shown though no mural emphysema is   demonstrated. BONES: No destructive bone lesion. ADDITIONAL COMMENTS: N/A           09/06/22 1108  CT CHEST WO CONT Final result    Impression:      1. Moderate bilateral pleural effusions, larger on the left. 2. Mild interstitial pulmonary edema. Bibasilar assessment of atelectasis. 3. Beard catheter within urinary bladder. Moderate diffuse urinary bladder mural   thickening. No emphysematous changes. No ureterectasis or renal   pelvocaliectasis. Narrative:  EXAM:  CT CHEST WO CONT, CT ABD PELV WO CONT       INDICATION: Severe left flank pain, severe left-sided chest pain       COMPARISON: Radiograph of the chest from earlier, CT of abdomen and pelvis   5/3/2022 and 7/10/2022       CONTRAST: No oral or IV contrast. The absence of contrast material diminishes   the capacity of CT to evaluate the bowel, solid organs, vasculature. TECHNIQUE:    Following the uneventful intravenous administration of contrast, thin axial   images were obtained through the chest, abdomen and pelvis. Coronal and sagittal   reconstructions were generated. CT dose reduction was achieved through use of a   standardized protocol tailored for this examination and automatic exposure   control for dose modulation. FINDINGS:        CHEST WALL: No mass. Bilateral gynecomastia. THYROID: No nodule. MEDIASTINUM: Small right paratracheal, aorticopulmonary and subcarinal lymph   nodes. KIN: No obvious mass. THORACIC AORTA: No dissection or aneurysm. MAIN PULMONARY ARTERY: Normal in caliber. TRACHEA/BRONCHI: Patent. ESOPHAGUS: No wall thickening or dilatation. HEART: Cardiac size upper limits of normal. Trace pericardial fluid. PLEURA: Moderate bilateral pleural effusions, larger on the left. LUNGS: Mild interstitial pulmonary edema with septal thickening. Subsegmental   atelectasis prominently shown in basilar lower lobes bilaterally with linear   atelectasis in right middle lobe and lingula also demonstrated. LIVER: No mass or biliary dilatation. GALLBLADDER: Unremarkable. SPLEEN: No mass. PANCREAS: No mass or ductal dilatation. ADRENALS: Unremarkable. KIDNEYS: No mass, calculus, or hydronephrosis. STOMACH: Unremarkable. SMALL BOWEL: No dilatation or wall thickening. COLON: No dilatation or wall thickening. APPENDIX: Unremarkable. PERITONEUM: No ascites or pneumoperitoneum. RETROPERITONEUM: No lymphadenopathy or aortic aneurysm. REPRODUCTIVE ORGANS: Those shown appear unremarkable. URINARY BLADDER: There is a Beard catheter within the urinary bladder which is   not distended. There is diffuse mural thickening of urinary bladder. A small   focus of nondependent intraluminal air is shown though no mural emphysema is   demonstrated. BONES: No destructive bone lesion. ADDITIONAL COMMENTS: N/A                 CXR Results  (Last 48 hours)                 09/06/22 1002  XR CHEST PORT Final result    Impression:  Mild-moderate interstitial pulmonary edema and small right pleural   effusion. Narrative:  EXAM: XR CHEST PORT       INDICATION: Shortness of breath       COMPARISON: 8/16/2022       FINDINGS: A portable AP radiograph of the chest was obtained at 095A hours. There is a mild to moderate appearance of interstitial pulmonary edema with   small right pleural effusion. Cardiac size is mildly enlarged. Mediastinal   contours are within normal limits. Hilar contours are obscured. The bones and   soft tissues are grossly within normal limits. Medical Decision Making   I am the first provider for this patient. I reviewed the vital signs, available nursing notes, past medical history, past surgical history, family history and social history. Vital Signs-Reviewed the patient's vital signs.   Patient Vitals for the past 12 hrs:   Temp Pulse Resp BP SpO2   09/06/22 1056 -- 75 -- 127/83 --   09/06/22 0956 -- -- -- -- 92 %   09/06/22 0942 -- 79 21 138/80 90 %   09/06/22 0842 97.7 °F (36.5 °C) 84 16 (!) 149/81 92 %       Records Reviewed: Nursing records and medical records reviewed    MDM:  Pt presents with acute abdominal pain; vital signs stable with currently a non-peritoneal exam; DDx includes: Gastroenteritis, hepatitis, pancreatitis, obstruction, appendicitis, viral illness, IBD, diverticulitis, mesenteric ischemia, AAA or descending dissection, ACS, kidney stone. Will get labs, treat symptomatically and obtain serial abdominal exams to determine if a CT is warranted. Will reassess and monitor closely. Patient presents with CP. DDx:  ACS, Aortic dissection, PNA, PE, PTX, pericarditis, myocarditis, GERD, costochondritis, anxiety. Provider Notes (Medical Decision Making):   81-year-old male presenting with findings of pulm edema, pleural effusions, acute respiratory failure with hypoxia. Serial EKGs and serial troponin showed no evidence of acute coronary syndrome. Patient is hypoxic, will admit for oxygen supplementation, further diuresis, and renal consultation due to worsening renal function. ED Course:   Initial assessment performed. The patients presenting problems have been discussed, and they are in agreement with the care plan formulated and outlined with them. I have encouraged them to ask questions as they arise throughout their visit. Critical Care:  I have spent 35 minutes of critical care time in evaluating and treating this patient. This includes time spent at bedside, time with family and decision makers, documentation, review of labs and imaging, and/or consultation with specialists. It does not include time spent on separately billed procedures. This patient presents with a critical illness or injury that acutely impairs one or more vital organ systems such that there is a high probability of imminent or life threatening deterioration in the patient's condition. This case involved decision making of high complexity to assess, manipulate, and support vital organ system failure and/or to prevent further life threatening deterioration of the patient's condition.  Failure to initiate these interventions on an urgent basis would likely result in sudden, clinically significant or life threatening deterioration in the patient's condition. Abnormal findings supporting critical care: Acute respiratory failure with hypoxia  Interventions to support critical care: IV Lasix, supplemental oxygen  Failure to intervene may result in: Respiratory failure        Disposition:  Admit Note:  1:44 PM  Pt is being admitted by Dr. Lizeth Anders. The results of their tests and reason(s) for their admission have been discussed with pt and/or available family. They convey agreement and understanding for the need to be admitted and for admission diagnosis. Diagnosis     Clinical Impression:   1. Pleural effusion    2. Acute pulmonary edema (HCC)    3. Acute respiratory failure with hypoxia (HCC)        Attestations:    Lalitha Shin MD    Please note that this dictation was completed with ioBridge, the computer voice recognition software. Quite often unanticipated grammatical, syntax, homophones, and other interpretive errors are inadvertently transcribed by the computer software. Please disregard these errors. Please excuse any errors that have escaped final proofreading. Thank you.

## 2022-09-06 NOTE — H&P
Hospitalist Admission Note    NAME: Herbert Castañeda   :  1980   MRN:  [id number]     Date/Time:  2022 3:57 PM    Patient PCP: Christelle Cuevas MD  ______________________________________________________________________  Given the patient's current clinical presentation, I have a high level of concern for decompensation if discharged from the emergency department. Complex decision making was performed, which includes reviewing the patient's available past medical records, laboratory results, and x-ray films. My assessment of this patient's clinical condition and my plan of care is as follows. Assessment / Plan:    Acute Hypoxic Respiratory Failure  2/2 to bilateral pleural effusions  CT chest with moderate bilateral effusions L>R  On 3L O2, keep supplemental O2 for sats >90%  Start IV lasix, consult IR  Troponin wnl, EKG without acute st segment changes  Pro BNP 5,688  ECHO with EF 44-56%, normal diastolic function ()  No indication to repeat ECHO at this time    Urinary retention  Beard in place from last admission  Cont. tamsulosin    DM Lantus 15 units SSI  FELECIA on CKD stage 4 vs new baseline? Consult Nephrology  HTN- cont. meds  Normocytic Anemia At baseline Hgb monitor  Correct Electrolytes as needed  PT/OT   CM/SW    Code Status: full  Surrogate Decision Maker: Cheo Elaine  [phone]    DVT Prophylaxis: heparin  GI Prophylaxis: not indicated    Baseline: home IADL's      Subjective:   CHIEF COMPLAINT: sob    HISTORY OF PRESENT ILLNESS:     Coni Orona is a 46 y.o.  male with a history of CKD, DM, Bipolar disorder who presents with sob for 1 day. Patient woke up this morning at 5am to sob, he endorses left sided sharp non radiating chest pain. His chest pain increases with inspiration and improves with rest. He denies cough, palpitations, fever chills, nausea or vomiting.  His mom drove him to the hospital.  Patient was recently discharged from the hospital 22 and was discharged with hansen in place due to urinary retention, he was supposed to have it removed outpatient this week. We were asked to admit for work up and evaluation of the above problems. Past Medical History:   Diagnosis Date    Bipolar 1 disorder, depressed ([facility]     Bipolar disorder ([facility]     Chronic kidney disease, stage 3a ([facility]     Depression     Diabetes ([facility]     DKA, type 1 ([facility] 2013    diagnosed age 23    DKA, type 1 ([address]     H/O noncompliance with medical treatment, presenting hazards to health     MRSA (methicillin resistant staph aureus) culture positive     MRSA (methicillin resistant Staphylococcus aureus)     Face    Noncompliance with medication regimen     Second hand smoke exposure     Seizure ([facility]     Seizures ([facility]  or     one episode during prison        Past Surgical History:   Procedure Laterality Date    HX HEENT      top left wisdom tooth    HX ORTHOPAEDIC Left     wrist; [facility]    UPPER GI ENDOSCOPY,BIOPSY  2018            Social History     Tobacco Use    Smoking status: Former     Packs/day: 0.10     Years: 16.00     Pack years: 1.60     Types: Cigarettes     Start date: 10/4/1999     Quit date: 2020     Years since quittin.5    Smokeless tobacco: Never   Substance Use Topics    Alcohol use: No        Family History   Problem Relation Age of Onset    Diabetes Mother     Diabetes Other         neice, type 1      No Known Allergies     Prior to Admission medications    Medication Sig Start Date End Date Taking? Authorizing Provider   acetaminophen-codeine (Tylenol-Codeine #3) 300-30 mg per tablet Take 1 Tablet by mouth every six (6) hours as needed for Pain for up to 30 days. Max Daily Amount: 4 Tablets. 22  So Medina MD   bacitracin zinc (BACITRACIN) ointment Apply  to affected area two (2) times a day.  22   So Medina MD   insulin glargine (Lantus Solostar U-100 Insulin) 100 unit/mL (3 mL) inpn 16 Units by SubCUTAneous route as needed (pump malfunction). 8/23/22   Crystal Hector MD   Ketone Blood Test strp 50 Each by Does Not Apply route as needed for PRN Reason (Other) (as needed for suspected dka). 8/23/22   Crystal Hector MD   pantoprazole (PROTONIX) 40 mg tablet Take 1 Tablet by mouth Daily (before breakfast). 8/21/22   Gustavo Cedeno MD   sodium bicarbonate 650 mg tablet Take 1 Tablet by mouth three (3) times daily for 30 days. 8/20/22 9/19/22  Gustavo Cedeno MD   furosemide (Lasix) 80 mg tablet Take 1 Tablet by mouth two (2) times a day for 30 days. 8/20/22 9/19/22  Gustavo Cedeno MD   hydrALAZINE (APRESOLINE) 100 mg tablet Take 1 Tablet by mouth three (3) times daily for 30 days. 8/20/22 9/19/22  Gustavo Cedeno MD   finasteride (PROSCAR) 5 mg tablet Take 1 Tablet by mouth in the morning. 7/25/22   Matthew Holter, MD   insulin aspart U-100 (NovoLOG U-100 Insulin aspart) 100 unit/mL injection Use as instructed via insulin pump. Dx code E10.65 max daily dose 50U 7/15/22   Crystal Hector MD   rosuvastatin (CRESTOR) 40 mg tablet  6/21/22   Provider, Historical   pregabalin (Lyrica) 75 mg capsule Take 1 Capsule by mouth two (2) times a day. Max Daily Amount: 150 mg. 7/14/22   Matthew Holter, MD Richerd Redfield (Ultra-Light Rollator) misc Use while ambulating 7/14/22   Matthew Holter, MD   amLODIPine (NORVASC) 10 mg tablet Take 1 Tablet by mouth daily. 7/14/22   Matthew Holter, MD   cloNIDine (CATAPRES) 0.1 mg/24 hr ptwk 1 Patch by TransDERmal route every seven (7) days.  7/14/22   Matthew Holter, MD   Dexcom G6  misc Use with the dexcom device to check blood sugars 4 times a day 7/1/22   Crystal Hector MD   DULoxetine (CYMBALTA) 60 mg capsule Take 1 capsule by mouth once daily 6/22/22   Matthew Holter, MD   Dexcom G6 Sensor brandi Use as directed every 10 days 6/21/22   Crystal Divers, MD   Dexcom G6 Transmitter brandi Use as directed 6/21/22   Crystal Divers, MD   FreeStyle Connor 14 Day Sensor kit Use as directed every 14 days  Patient not taking: No sig reported 6/21/22   Nato Tse MD   Insulin Needles, Disposable, 31 gauge x 5/16\" ndle Dx code E11.65, use 6x daily 6/21/22   Nato Tse MD   insulin glargine (LANTUS) 100 unit/mL injection 16 Units by SubCUTAneous route daily. 6/21/22   Nato Tse MD   insulin aspart U-100 (NovoLOG Flexpen U-100 Insulin) 100 unit/mL (3 mL) inpn Take 1 unit for every 15 grams of carbohydrates, 1 unit for every 50 points >150. Max daily dose 25U. 6/21/22   Nato Tse MD   carvediloL (COREG) 12.5 mg tablet Take 1 Tablet by mouth two (2) times daily (with meals). 5/19/22   Mahesh Shine MD   tamsulosin (FLOMAX) 0.4 mg capsule Take 0.4 mg by mouth daily. Provider, Historical   pen needle, diabetic 30 gauge x 3/16\" ndle 5 Units by Does Not Apply route Before breakfast, lunch, and dinner. 2/23/22   Mahesh Shine MD       REVIEW OF SYSTEMS:     I am not able to complete the review of systems because:    The patient is intubated and sedated    The patient has altered mental status due to his acute medical problems    The patient has baseline aphasia from prior stroke(s)    The patient has baseline dementia and is not reliable historian    The patient is in acute medical distress and unable to provide information           Total of 12 systems reviewed as follows:       POSITIVE= underlined text  Negative = text not underlined  General:  fever, chills, sweats, generalized weakness, weight loss/gain,      loss of appetite   Eyes:    blurred vision, eye pain, loss of vision, double vision  ENT:    rhinorrhea, pharyngitis   Respiratory:   cough, sputum production, SOB, [place], wheezing, pleuritic pain   Cardiology:   chest pain, palpitations, orthopnea, PND, edema, syncope   Gastrointestinal:  abdominal pain , N/V, diarrhea, dysphagia, constipation, bleeding   Genitourinary:  frequency, urgency, dysuria, hematuria, incontinence   Muskuloskeletal : arthralgia, myalgia, back pain  Hematology:  easy bruising, nose or gum bleeding, lymphadenopathy   Dermatological: rash, ulceration, pruritis, color change / jaundice  Endocrine:   hot flashes or polydipsia   Neurological:  headache, dizziness, confusion, focal weakness, paresthesia,     Speech difficulties, memory loss, gait difficulty  Psychological: Feelings of anxiety, depression, agitation    Objective:   VITALS:    Visit Vitals  /83   Pulse 75   Temp 97.7 °F (36.5 °C)   Resp 21   Ht 5' 9\" (1.753 m)   Wt 81.6 kg (180 lb)   SpO2 92%   BMI 26.58 kg/m²       PHYSICAL EXAM:    General:    Alert, cooperative, appears stated age. Lungs:   Decreased breath sounds BL, no wheeze  Chest wall:  No tenderness  No Accessory muscle use. Heart:   Regular  rhythm,  No  murmur   No edema  Abdomen:   Soft, non-tender. Not distended. Bowel sounds normal  Extremities: No cyanosis. No clubbing,      Skin turgor normal, Capillary refill normal, Radial dial pulse 2+  Skin:     Not pale. Not Jaundiced  No rashes   Psych:  Not depressed. Not anxious or agitated. Neurologic: No facial asymmetry. No aphasia or slurred speech. Symmetrical strength, Sensation grossly intact.  Alert and oriented X 4.     _______________________________________________________________________  Care Plan discussed with:    Comments   Patient x    Family      RN x    Care Manager                    Consultant:      _______________________________________________________________________  Expected  Disposition:   Home with Family x   HH/PT/OT/RN    SNF/LTC    CATHIE    ________________________________________________________________________  TOTAL TIME:  79 Minutes    Critical Care Provided     Minutes non procedure based      Comments     Reviewed previous records   >50% of visit spent in counseling and coordination of care x Discussion with patient and/or family and questions answered ________________________________________________________________________  Signed: Mai Salcedo MD    Procedures: see electronic medical records for all procedures/Xrays and details which were not copied into this note but were reviewed prior to creation of Plan. LAB DATA REVIEWED:    Recent Results (from the past 24 hour(s))   EKG, 12 LEAD, INITIAL    Collection Time: 09/06/22  8:50 AM   Result Value Ref Range    Ventricular Rate 83 BPM    Atrial Rate 83 BPM    P-R Interval 180 ms    QRS Duration 92 ms    Q-T Interval 410 ms    QTC Calculation (Bezet) 481 ms    Calculated P Axis 53 degrees    Calculated R Axis 23 degrees    Calculated T Axis 57 degrees    Diagnosis       Normal sinus rhythm  When compared with ECG of 11-AUG-2022 23:19,  Vent. rate has increased BY  34 BPM  Questionable change in QRS axis  Confirmed by Yeimy Raymond, P.VWillam ([id number]) on 9/6/2022 9:05:32 AM     CBC WITH AUTOMATED DIFF    Collection Time: 09/06/22  9:16 AM   Result Value Ref Range    WBC 11.9 (H) 4.1 - 11.1 K/uL    RBC 3.16 (L) 4.10 - 5.70 M/uL    HGB 8.9 (L) 12.1 - 17.0 g/dL    HCT 27.1 (L) 36.6 - 50.3 %    MCV 85.8 80.0 - 99.0 FL    MCH 28.2 26.0 - 34.0 PG    MCHC 32.8 30.0 - 36.5 g/dL    RDW 14.7 (H) 11.5 - 14.5 %    PLATELET 103 764 - 416 K/uL    NRBC 0.0 0  WBC    ABSOLUTE NRBC 0.00 0.00 - 0.01 K/uL    NEUTROPHILS 76 (H) 32 - 75 %    LYMPHOCYTES 11 (L) 12 - 49 %    MONOCYTES 7 5 - 13 %    EOSINOPHILS 4 0 - 7 %    BASOPHILS 1 0 - 1 %    IMMATURE GRANULOCYTES 1 (H) 0.0 - 0.5 %    ABS. NEUTROPHILS 9.1 (H) 1.8 - 8.0 K/UL    ABS. LYMPHOCYTES 1.3 0.8 - 3.5 K/UL    ABS. MONOCYTES 0.8 0.0 - 1.0 K/UL    ABS. EOSINOPHILS 0.5 (H) 0.0 - 0.4 K/UL    ABS. BASOPHILS 0.1 0.0 - 0.1 K/UL    ABS. IMM.  GRANS. 0.1 (H) 0.00 - 0.04 K/UL    DF SMEAR SCANNED      RBC COMMENTS NORMOCYTIC, NORMOCHROMIC     METABOLIC PANEL, COMPREHENSIVE    Collection Time: 09/06/22  9:16 AM   Result Value Ref Range    Sodium 136 136 - 145 mmol/L    Potassium 4.1 3.5 - 5.1 mmol/L    Chloride 100 97 - 108 mmol/L    CO2 26 21 - 32 mmol/L    Anion gap 10 5 - 15 mmol/L    Glucose 148 (H) 65 - 100 mg/dL    BUN 69 (H) 6 - 20 MG/DL    Creatinine 5.56 (H) 0.70 - 1.30 MG/DL    BUN/Creatinine ratio 12 12 - 20      GFR est AA 14 (L) >60 ml/min/1.73m2    GFR est non-AA 11 (L) >60 ml/min/1.73m2    Calcium 8.6 8.5 - 10.1 MG/DL    Bilirubin, total 0.3 0.2 - 1.0 MG/DL    ALT (SGPT) 47 12 - 78 U/L    AST (SGOT) 56 (H) 15 - 37 U/L    Alk. phosphatase 155 (H) 45 - 117 U/L    Protein, total 6.8 6.4 - 8.2 g/dL    Albumin 2.7 (L) 3.5 - 5.0 g/dL    Globulin 4.1 (H) 2.0 - 4.0 g/dL    A-G Ratio 0.7 (L) 1.1 - 2.2     TROPONIN-HIGH SENSITIVITY    Collection Time: 09/06/22  9:16 AM   Result Value Ref Range    Troponin-High Sensitivity 11 0 - 76 ng/L   NT-PRO BNP    Collection Time: 09/06/22  9:16 AM   Result Value Ref Range    NT pro-BNP 5,688 (H) <125 PG/ML   D DIMER    Collection Time: 09/06/22  9:16 AM   Result Value Ref Range    D-dimer 1.91 (H) 0.00 - 0.65 mg/L FEU   LIPASE    Collection Time: 09/06/22  9:16 AM   Result Value Ref Range    Lipase 68 (L) 73 - 393 U/L   SAMPLES BEING HELD    Collection Time: 09/06/22  9:30 AM   Result Value Ref Range    SAMPLES BEING HELD YELLOW, GRAY     COMMENT        Add-on orders for these samples will be processed based on acceptable specimen integrity and analyte stability, which may vary by analyte.    TROPONIN-HIGH SENSITIVITY    Collection Time: 09/06/22 11:25 AM   Result Value Ref Range    Troponin-High Sensitivity 10 0 - 76 ng/L   EKG, 12 LEAD, SUBSEQUENT    Collection Time: 09/06/22 11:35 AM   Result Value Ref Range    Ventricular Rate 76 BPM    Atrial Rate 76 BPM    P-R Interval 170 ms    QRS Duration 98 ms    Q-T Interval 434 ms    QTC Calculation (Bezet) 488 ms    Calculated P Axis 49 degrees    Calculated R Axis 53 degrees    Calculated T Axis 64 degrees    Diagnosis       Normal sinus rhythm  Septal infarct , age undetermined  When compared with ECG of 06-SEP-2022 08:50,  Septal infarct is now present

## 2022-09-06 NOTE — PROGRESS NOTES
Transition of Care Plan:     RUR: 33% GLOS:    TBD  LOS:   3 Hours  Disposition:   Home with resumption of Home Health/[facility]  Follow up appointments:   PCP and specialist as indicated  DME needed:  Received rollator on last admission  Transportation at Discharge: Mother  [address] or means to access home:      Lives with mother  IM Medicare Letter:   N/A  Is patient a [place] and connected with the [place]? No              If yes, was [employer] transfer form completed and [facility] notified? Caregiver Contact:   Camila Velez [phone]  Discharge Caregiver contacted prior to discharge? Yes  Care Conference needed?:         Not indicated at this time    Reason for Readmission:     shortness of breath, right sided abdominal pain          RUR Score/Risk Level:     33% High Risk for Admission     PCP: First and Last name:    Dr. Corrine Taylor   Name of Practice:  [facility]   Are you a current patient: Yes/No:    Yes   Approximate date of last visit:  8/25/22   Can you participate in a virtual visit with your PCP:   Yes    Is a Care Conference indicated:  None indicated      Did you attend your follow up appointment (s): If not, why not:  Yes, follow up with PCP         Resources/supports as identified by patient/family:    Good relationship with mother, lives in her home. He has a [de-identified] year old daughter. Top Challenges facing patient (as identified by patient/family and CM): Finances/Medication cost?     0 copay Medicaid  Transportation      Mother  Support system or lack thereof? Good support system     Living arrangements? Lives with mother  Self-care/ADLs/Cognition?      Independent within home, needs assistance outside of the home       Current Advanced Directive/Advance Care Plan:  Full Code           Plan for utilizing home health:   Open to Joann Perry             Transition of Care Plan:      La Swanson is a 46year old male to [facility] [place] [facility] with shortness of breath, CT of chest - bilateral pleural effusions, pulmonary edema. He has insulin pump. Noted previous admission  8/11-8/20. Discharged home with [place]. Using rollator outside of the home, ramp at the rear of home    Care Management Interventions  PCP Verified by CM:  Yes (Dr. Víctor Tian)  Last Visit to PCP: 07/14/22  Palliative Care Criteria Met (RRAT>21 & CHF Dx)?: No  Transition of Care Consult (CM Consult): Discharge Planning (Discharge Planning Consult)  [address] [place] [place]: No  Discharge Durable Medical Equipment: No  Health Maintenance Reviewed: Yes  Physical Therapy Consult: No  Occupational Therapy Consult: No  Speech Therapy Consult: No  Support Systems: Parent(s) (Lives with mother, [de-identified] year old daughter)  Confirm Follow Up Transport: Family  Discharge Location  Patient Expects to be Discharged to[de-identified] Home with home health (Return home with home health)    Marylu Villa,   ED Care Management  [phone]

## 2022-09-06 NOTE — ED NOTES
Spoke with nursing supervisor who states that if the patient can manage his insulin pupm he is allowed to keep it on. Notified the floor rn that he does have it on when I gave report.

## 2022-09-06 NOTE — ED NOTES
Pt states he woke up this morning at 5 am with left sided chest pain and sob. He denies n/v/d, fevers or any other symptoms. States the pain came on suddenly.

## 2022-09-06 NOTE — PROGRESS NOTES
Report received from [facility] from ED. Report included SBAR. Writer requested blood sugar check on patient. Per Dylon Cristincisco patient has an insulin pump he manages on his own. Malu Le patient has insulin orders and we need to check it ourselves. No MEWS score available. No temp checked in ED.    1815- Patient arrived to the unit. Alert and oriented. Vital signs obtained. Unable to obtain temperature. Blood sugar reading of 44. Patient given orange juice and dinner tray. 1841- Repeat blood sugar after meal. 67. Orange juice. 1857- Repeat blood sugar 118.      1920- Bedside and Verbal shift change report given to [address] (oncoming nurse) by Chuckie Mendez RN (offgoing nurse). Report included the following information SBAR, Kardex, MAR, Recent Results, and Cardiac Rhythm NSR .

## 2022-09-07 ENCOUNTER — APPOINTMENT (OUTPATIENT)
Dept: INTERVENTIONAL RADIOLOGY/VASCULAR | Age: 42
DRG: 470 | End: 2022-09-07
Attending: INTERNAL MEDICINE
Payer: COMMERCIAL

## 2022-09-07 ENCOUNTER — APPOINTMENT (OUTPATIENT)
Dept: GENERAL RADIOLOGY | Age: 42
DRG: 470 | End: 2022-09-07
Attending: STUDENT IN AN ORGANIZED HEALTH CARE EDUCATION/TRAINING PROGRAM
Payer: COMMERCIAL

## 2022-09-07 LAB
ALBUMIN SERPL-MCNC: 2.5 G/DL (ref 3.5–5)
ANION GAP SERPL CALC-SCNC: 7 MMOL/L (ref 5–15)
ATRIAL RATE: 76 BPM
BUN SERPL-MCNC: 75 MG/DL (ref 6–20)
BUN/CREAT SERPL: 14 (ref 12–20)
CALCIUM SERPL-MCNC: 8.3 MG/DL (ref 8.5–10.1)
CALCULATED P AXIS, ECG09: 49 DEGREES
CALCULATED R AXIS, ECG10: 53 DEGREES
CALCULATED T AXIS, ECG11: 64 DEGREES
CHLORIDE SERPL-SCNC: 101 MMOL/L (ref 97–108)
CO2 SERPL-SCNC: 28 MMOL/L (ref 21–32)
CREAT SERPL-MCNC: 5.5 MG/DL (ref 0.7–1.3)
DIAGNOSIS, 93000: NORMAL
ERYTHROCYTE [DISTWIDTH] IN BLOOD BY AUTOMATED COUNT: 14.4 % (ref 11.5–14.5)
GLUCOSE BLD STRIP.AUTO-MCNC: 132 MG/DL (ref 65–117)
GLUCOSE BLD STRIP.AUTO-MCNC: 162 MG/DL (ref 65–117)
GLUCOSE BLD STRIP.AUTO-MCNC: 60 MG/DL (ref 65–117)
GLUCOSE BLD STRIP.AUTO-MCNC: 65 MG/DL (ref 65–117)
GLUCOSE BLD STRIP.AUTO-MCNC: 70 MG/DL (ref 65–117)
GLUCOSE BLD STRIP.AUTO-MCNC: 74 MG/DL (ref 65–117)
GLUCOSE BLD STRIP.AUTO-MCNC: 90 MG/DL (ref 65–117)
GLUCOSE SERPL-MCNC: 69 MG/DL (ref 65–100)
HBV SURFACE AB SER QL: NONREACTIVE
HBV SURFACE AB SER-ACNC: <3.1 MIU/ML
HBV SURFACE AG SER QL: <0.1 INDEX
HBV SURFACE AG SER QL: NEGATIVE
HCT VFR BLD AUTO: 25.2 % (ref 36.6–50.3)
HCV AB SER IA-ACNC: 5.8 INDEX
HCV AB SERPL QL IA: REACTIVE
HGB BLD-MCNC: 8.2 G/DL (ref 12.1–17)
MCH RBC QN AUTO: 28.7 PG (ref 26–34)
MCHC RBC AUTO-ENTMCNC: 32.5 G/DL (ref 30–36.5)
MCV RBC AUTO: 88.1 FL (ref 80–99)
NRBC # BLD: 0 K/UL (ref 0–0.01)
NRBC BLD-RTO: 0 PER 100 WBC
P-R INTERVAL, ECG05: 170 MS
PHOSPHATE SERPL-MCNC: 7.7 MG/DL (ref 2.6–4.7)
PLATELET # BLD AUTO: 235 K/UL (ref 150–400)
PMV BLD AUTO: 11.6 FL (ref 8.9–12.9)
POTASSIUM SERPL-SCNC: 4.2 MMOL/L (ref 3.5–5.1)
Q-T INTERVAL, ECG07: 434 MS
QRS DURATION, ECG06: 98 MS
QTC CALCULATION (BEZET), ECG08: 488 MS
RBC # BLD AUTO: 2.86 M/UL (ref 4.1–5.7)
SERVICE CMNT-IMP: ABNORMAL
SERVICE CMNT-IMP: NORMAL
SODIUM SERPL-SCNC: 136 MMOL/L (ref 136–145)
VENTRICULAR RATE, ECG03: 76 BPM
WBC # BLD AUTO: 7.9 K/UL (ref 4.1–11.1)

## 2022-09-07 PROCEDURE — 65270000029 HC RM PRIVATE

## 2022-09-07 PROCEDURE — 86803 HEPATITIS C AB TEST: CPT

## 2022-09-07 PROCEDURE — 74011250637 HC RX REV CODE- 250/637: Performed by: EMERGENCY MEDICINE

## 2022-09-07 PROCEDURE — 86706 HEP B SURFACE ANTIBODY: CPT

## 2022-09-07 PROCEDURE — 74011250636 HC RX REV CODE- 250/636: Performed by: STUDENT IN AN ORGANIZED HEALTH CARE EDUCATION/TRAINING PROGRAM

## 2022-09-07 PROCEDURE — 90935 HEMODIALYSIS ONE EVALUATION: CPT

## 2022-09-07 PROCEDURE — 74011000250 HC RX REV CODE- 250: Performed by: INTERNAL MEDICINE

## 2022-09-07 PROCEDURE — 74011250637 HC RX REV CODE- 250/637: Performed by: INTERNAL MEDICINE

## 2022-09-07 PROCEDURE — 94760 N-INVAS EAR/PLS OXIMETRY 1: CPT

## 2022-09-07 PROCEDURE — 82962 GLUCOSE BLOOD TEST: CPT

## 2022-09-07 PROCEDURE — 85027 COMPLETE CBC AUTOMATED: CPT

## 2022-09-07 PROCEDURE — 02HV33Z INSERTION OF INFUSION DEVICE INTO SUPERIOR VENA CAVA, PERCUTANEOUS APPROACH: ICD-10-PCS | Performed by: STUDENT IN AN ORGANIZED HEALTH CARE EDUCATION/TRAINING PROGRAM

## 2022-09-07 PROCEDURE — 36415 COLL VENOUS BLD VENIPUNCTURE: CPT

## 2022-09-07 PROCEDURE — C1892 INTRO/SHEATH,FIXED,PEEL-AWAY: HCPCS

## 2022-09-07 PROCEDURE — 2709999900 HC NON-CHARGEABLE SUPPLY

## 2022-09-07 PROCEDURE — 80069 RENAL FUNCTION PANEL: CPT

## 2022-09-07 PROCEDURE — 87340 HEPATITIS B SURFACE AG IA: CPT

## 2022-09-07 PROCEDURE — 86704 HEP B CORE ANTIBODY TOTAL: CPT

## 2022-09-07 PROCEDURE — 71045 X-RAY EXAM CHEST 1 VIEW: CPT

## 2022-09-07 PROCEDURE — 74011250636 HC RX REV CODE- 250/636: Performed by: INTERNAL MEDICINE

## 2022-09-07 PROCEDURE — 74011000250 HC RX REV CODE- 250: Performed by: STUDENT IN AN ORGANIZED HEALTH CARE EDUCATION/TRAINING PROGRAM

## 2022-09-07 PROCEDURE — 77010033678 HC OXYGEN DAILY

## 2022-09-07 PROCEDURE — 5A1D70Z PERFORMANCE OF URINARY FILTRATION, INTERMITTENT, LESS THAN 6 HOURS PER DAY: ICD-10-PCS | Performed by: STUDENT IN AN ORGANIZED HEALTH CARE EDUCATION/TRAINING PROGRAM

## 2022-09-07 PROCEDURE — 76937 US GUIDE VASCULAR ACCESS: CPT

## 2022-09-07 RX ORDER — HEPARIN 100 UNIT/ML
300 SYRINGE INTRAVENOUS ONCE
Status: COMPLETED | OUTPATIENT
Start: 2022-09-07 | End: 2022-09-07

## 2022-09-07 RX ORDER — HEPARIN SODIUM 200 [USP'U]/100ML
400 INJECTION, SOLUTION INTRAVENOUS ONCE
Status: COMPLETED | OUTPATIENT
Start: 2022-09-07 | End: 2022-09-07

## 2022-09-07 RX ORDER — LIDOCAINE HYDROCHLORIDE 20 MG/ML
10 INJECTION, SOLUTION INFILTRATION; PERINEURAL ONCE
Status: COMPLETED | OUTPATIENT
Start: 2022-09-07 | End: 2022-09-07

## 2022-09-07 RX ADMIN — SODIUM CHLORIDE, PRESERVATIVE FREE 300 UNITS: 5 INJECTION INTRAVENOUS at 10:00

## 2022-09-07 RX ADMIN — BUMETANIDE 2 MG: 0.25 INJECTION INTRAMUSCULAR; INTRAVENOUS at 22:44

## 2022-09-07 RX ADMIN — BUMETANIDE 2 MG: 0.25 INJECTION INTRAMUSCULAR; INTRAVENOUS at 06:31

## 2022-09-07 RX ADMIN — BUMETANIDE 2 MG: 0.25 INJECTION INTRAMUSCULAR; INTRAVENOUS at 13:17

## 2022-09-07 RX ADMIN — HYDRALAZINE HYDROCHLORIDE 100 MG: 50 TABLET, FILM COATED ORAL at 09:10

## 2022-09-07 RX ADMIN — HEPARIN SODIUM 20 ML: 200 INJECTION, SOLUTION INTRAVENOUS at 10:00

## 2022-09-07 RX ADMIN — CARVEDILOL 12.5 MG: 12.5 TABLET, FILM COATED ORAL at 16:50

## 2022-09-07 RX ADMIN — HYDRALAZINE HYDROCHLORIDE 100 MG: 50 TABLET, FILM COATED ORAL at 16:50

## 2022-09-07 RX ADMIN — TAMSULOSIN HYDROCHLORIDE 0.4 MG: 0.4 CAPSULE ORAL at 09:10

## 2022-09-07 RX ADMIN — HYDRALAZINE HYDROCHLORIDE 100 MG: 50 TABLET, FILM COATED ORAL at 22:50

## 2022-09-07 RX ADMIN — ROSUVASTATIN CALCIUM 10 MG: 10 TABLET, FILM COATED ORAL at 22:44

## 2022-09-07 RX ADMIN — CARVEDILOL 12.5 MG: 12.5 TABLET, FILM COATED ORAL at 09:10

## 2022-09-07 RX ADMIN — HEPARIN SODIUM 5000 UNITS: 5000 INJECTION INTRAVENOUS; SUBCUTANEOUS at 18:07

## 2022-09-07 RX ADMIN — PANTOPRAZOLE SODIUM 40 MG: 40 TABLET, DELAYED RELEASE ORAL at 06:31

## 2022-09-07 RX ADMIN — OXYCODONE AND ACETAMINOPHEN 1 TABLET: 5; 325 TABLET ORAL at 06:37

## 2022-09-07 RX ADMIN — PREGABALIN 75 MG: 50 CAPSULE ORAL at 09:10

## 2022-09-07 RX ADMIN — OXYCODONE AND ACETAMINOPHEN 1 TABLET: 5; 325 TABLET ORAL at 00:22

## 2022-09-07 RX ADMIN — ACETAMINOPHEN 650 MG: 325 TABLET ORAL at 22:54

## 2022-09-07 RX ADMIN — OXYCODONE AND ACETAMINOPHEN 1 TABLET: 5; 325 TABLET ORAL at 20:17

## 2022-09-07 RX ADMIN — LIDOCAINE HYDROCHLORIDE 5 ML: 20 INJECTION, SOLUTION INFILTRATION; PERINEURAL at 10:00

## 2022-09-07 RX ADMIN — PREGABALIN 75 MG: 50 CAPSULE ORAL at 18:08

## 2022-09-07 RX ADMIN — AMLODIPINE BESYLATE 10 MG: 5 TABLET ORAL at 09:10

## 2022-09-07 RX ADMIN — HEPARIN SODIUM 5000 UNITS: 5000 INJECTION INTRAVENOUS; SUBCUTANEOUS at 06:31

## 2022-09-07 RX ADMIN — ACETAMINOPHEN 650 MG: 325 TABLET ORAL at 16:50

## 2022-09-07 RX ADMIN — OXYCODONE AND ACETAMINOPHEN 1 TABLET: 5; 325 TABLET ORAL at 13:17

## 2022-09-07 NOTE — PROGRESS NOTES
PT orders received and chart reviewed for PT Evaluation. At time of review, note that patient is undergoing catheter placement and thoracentesis today. Will defer and initiate PT services tomorrow, 9/8/2022.

## 2022-09-07 NOTE — PROGRESS NOTES
Nephrology Progress Note  [facility] / [address] [place], [address]  Phone - [phone]  Fax - [phone]                 Patient: Soledad Dior                   YOB: 1980        Date- 9/7/2022                      Admit Date: 9/6/2022  CC: Follow up for CKD stage IV with progression to ESRD          IMPRESSION & PLAN:   CKD stage IV with progression to ESRD  Volume overload  Acute hypoxic respiratory failure  Urinary retention status post Beard  Type 1 diabetes  Hypertension  Anemia  Pleural effusion    PLAN-  Patient continues to demonstrate poor renal clearance. He still remains nonoliguric. This is patient's second admission for volume overload despite of using Bumex twice daily dosing at home. Suspect patient needs to be initiated on hemodialysis in view of poor renal clearance and persistent volume overload  I spoke to him in detail and got him consented for hemodialysis he is agreeable. Plan to start hemodialysis from today, we will dialyze him 3 days in a row. Consult placed for IR for Francis catheter placement. Plan for possible thoracocentesis today. Continue Epogen for anemia  Also consulted  for placement at [facility] unit. Spoke to RN     Subjective: Interval History:   -Seen and examined this morning.  -Urine output not charted. -Creatinine remains elevated.     Objective:   Vitals:    09/06/22 2224 09/07/22 0213 09/07/22 0800 09/07/22 0817   BP: 132/76 130/70  128/76   Pulse: 78 76 82 82   Resp: 19 18  18   Temp: 97.7 °F (36.5 °C) 98 °F (36.7 °C)  98.3 °F (36.8 °C)   SpO2: 94% 92%  91%   Weight:       Height:          09/06 0701 - 09/07 0700  In: -   Out: 850 [Urine:850]  Last 3 Recorded Weights in this Encounter    09/06/22 0842   Weight: 81.6 kg (180 lb)      Physical exam:    GEN: NAD  NECK- Supple, no mass  RESP: No wheezing, Clear b/l  CVS: S1,S2  RRR  NEURO: Normal speech, Non focal  EXT: 1+ edema      Chart reviewed. Pertinent Notes reviewed. Data Review :  Recent Labs     09/07/22  0337 09/06/22  0916    136   K 4.2 4.1    100   CO2 28 26   BUN 75* 69*   CREA 5.50* 5.56*   GLU 69 148*   CA 8.3* 8.6   PHOS 7.7*  --      Recent Labs     09/07/22 0337 09/06/22  0916   WBC 7.9 11.9*   HGB 8.2* 8.9*   HCT 25.2* 27.1*    245     No results for input(s): FE, TIBC, PSAT, FERR in the last 72 hours.    Medication list  reviewed  Current Facility-Administered Medications   Medication Dose Route Frequency    amLODIPine (NORVASC) tablet 10 mg  10 mg Oral DAILY    carvediloL (COREG) tablet 12.5 mg  12.5 mg Oral BID WITH MEALS    rosuvastatin (CRESTOR) tablet 10 mg  10 mg Oral QHS    tamsulosin (FLOMAX) capsule 0.4 mg  0.4 mg Oral DAILY    hydrALAZINE (APRESOLINE) tablet 100 mg  100 mg Oral TID    pregabalin (LYRICA) capsule 75 mg  75 mg Oral BID    insulin glargine (LANTUS) injection 15 Units  15 Units SubCUTAneous QHS    acetaminophen (TYLENOL) tablet 650 mg  650 mg Oral Q6H PRN    ondansetron (ZOFRAN) injection 4 mg  4 mg IntraVENous Q4H PRN    [START ON 9/12/2022] cloNIDine (CATAPRES) 0.1 mg/24 hr patch 1 Patch  1 Patch TransDERmal Q7D    pantoprazole (PROTONIX) tablet 40 mg  40 mg Oral ACB    heparin (porcine) injection 5,000 Units  5,000 Units SubCUTAneous Q12H    insulin lispro (HUMALOG) injection   SubCUTAneous TIDAC    glucose chewable tablet 16 g  4 Tablet Oral PRN    glucagon (GLUCAGEN) injection 1 mg  1 mg IntraMUSCular PRN    dextrose 10% infusion 0-250 mL  0-250 mL IntraVENous PRN    oxyCODONE-acetaminophen (PERCOCET) 5-325 mg per tablet 1 Tablet  1 Tablet Oral Q6H PRN    epoetin rell-epbx (RETACRIT) 12,000 Units combo injection  12,000 Units SubCUTAneous Q TUE, THU & SAT    bumetanide (BUMEX) injection 2 mg  2 mg IntraVENous Q8H        Mt Jain MD  9/7/2022

## 2022-09-07 NOTE — PROGRESS NOTES
Hospitalist Progress Note    NAME: Abhinav Thompson   :  1980   MRN:  [id number]       Assessment / Plan:  cute Hypoxic Respiratory Failure  2/2 to bilateral pleural effusions  Arden on ckd 4  Started on hemodialysis on   CT chest with moderate bilateral effusions L>R  On 3L O2, keep supplemental O2 for sats >90%  Continue with IV Lasix, he is oliguric  Troponin wnl, EKG without acute st segment changes  Pro BNP 5,688  ECHO with EF 65-18%, normal diastolic function ()  No indication to repeat ECHO at this time  No improvement despite IV Lasix, nephrology decided [facility]  Hold off on thoracocentesis  We will repeat chest x-ray, if persistent pleural effusion will consider thoracocentesis    Urinary retention  Beard in place from last admission  Cont. tamsulosin    DM Lantus 15 units SSI  HTN- cont. meds  Normocytic Anemia At baseline Hgb monitor  Correct Electrolytes as needed  PT/OT   CM/SW    Code Status: full  Surrogate Decision Maker: Cheo Elaine  [phone]    DVT Prophylaxis: heparin  GI Prophylaxis: not indicated    Baseline: home IADL's    25.0 - 29.9 Overweight / Body mass index is 26.58 kg/m². Estimated discharge date:   Barriers:    Code status: Full  Prophylaxis: Hep SQ  Recommended Disposition: Home w/Family     Subjective:     Chief Complaint / Reason for Physician Visit  Follow-up acute respiratory failure with hypoxia, worsening CKD. Patient was started on hemodialysis today. Remains on 3 L of oxygen discussed with RN events overnight.      Review of Systems:  Symptom Y/N Comments  Symptom Y/N Comments   Fever/Chills n   Chest Pain n    Poor Appetite    Edema     Cough n   Abdominal Pain n    Sputum    Joint Pain     SOB/JOHNSTON y   Pruritis/Rash     Nausea/vomit n   Tolerating PT/OT     Diarrhea n   Tolerating Diet n    Constipation    Other       Could NOT obtain due to:      Objective:     VITALS:   Last 24hrs VS reviewed since prior progress note. Most recent are:  Patient Vitals for the past 24 hrs:   Temp Pulse Resp BP SpO2   09/07/22 1648 97.9 °F (36.6 °C) 81 14 (!) 140/85 98 %   09/07/22 1622 98.1 °F (36.7 °C) 79 18 138/76 --   09/07/22 1615 -- 78 18 115/75 --   09/07/22 1600 -- 80 18 131/69 --   09/07/22 1545 -- 80 18 111/70 --   09/07/22 1530 -- 76 18 123/63 --   09/07/22 1515 -- 74 18 (!) 124/45 --   09/07/22 1500 -- 75 18 (!) 120/58 --   09/07/22 1445 -- 73 18 119/65 --   09/07/22 1430 -- 76 18 116/61 --   09/07/22 1415 -- 74 18 111/62 --   09/07/22 1400 -- 74 18 130/68 --   09/07/22 1349 97.5 °F (36.4 °C) 75 18 137/73 --   09/07/22 1317 97.4 °F (36.3 °C) 74 20 136/84 93 %   09/07/22 1150 -- 78 13 (!) 140/74 92 %   09/07/22 1128 -- 81 15 (!) 145/77 92 %   09/07/22 0817 98.3 °F (36.8 °C) 82 18 128/76 91 %   09/07/22 0800 -- 82 -- -- --   09/07/22 0213 98 °F (36.7 °C) 76 18 130/70 92 %   09/06/22 2224 97.7 °F (36.5 °C) 78 19 132/76 94 %   09/06/22 1932 (!) 93.7 °F (34.3 °C) 76 18 124/82 92 %   09/06/22 1821 -- 75 18 120/75 92 %       Intake/Output Summary (Last 24 hours) at 9/7/2022 1759  Last data filed at 9/7/2022 1622  Gross per 24 hour   Intake --   Output 2350 ml   Net -2350 ml        I had a face to face encounter and independently examined this patient on 9/7/2022, as outlined below:  PHYSICAL EXAM:  General: WD, WN. Alert, cooperative, no acute distress    EENT:  EOMI. Anicteric sclerae. MMM  Resp:  CTA bilaterally, no wheezing or rales. No accessory muscle use  CV:  Regular  rhythm,  No edema  GI:  Soft, Non distended, Non tender. +Bowel sounds  Neurologic:  Alert and oriented X 3, normal speech,   Psych:   Good insight. Not anxious nor agitated  Skin:  No rashes.   No jaundice    Reviewed most current lab test results and cultures  YES  Reviewed most current radiology test results   YES  Review and summation of old records today    NO  Reviewed patient's current orders and MAR    YES  PMH/SH reviewed - no change compared to H&P  ________________________________________________________________________  Care Plan discussed with:    Comments   Patient x    Family      RN x    Care Manager     Consultant                        Multidiciplinary team rounds were held today with , nursing, pharmacist and clinical coordinator. Patient's plan of care was discussed; medications were reviewed and discharge planning was addressed. ________________________________________________________________________  Total NON critical care TIME:  35   Minutes    Total CRITICAL CARE TIME Spent:   Minutes non procedure based      Comments   >50% of visit spent in counseling and coordination of care     ________________________________________________________________________  Camden Gaytan MD     Procedures: see electronic medical records for all procedures/Xrays and details which were not copied into this note but were reviewed prior to creation of Plan. LABS:  I reviewed today's most current labs and imaging studies.   Pertinent labs include:  Recent Labs     09/07/22 0337 09/06/22 0916   WBC 7.9 11.9*   HGB 8.2* 8.9*   HCT 25.2* 27.1*    245     Recent Labs     09/07/22 0337 09/06/22 0916    136   K 4.2 4.1    100   CO2 28 26   GLU 69 148*   BUN 75* 69*   CREA 5.50* 5.56*   CA 8.3* 8.6   PHOS 7.7*  --    ALB 2.5* 2.7*   TBILI  --  0.3   ALT  --  47       Signed: Camden Gaytan MD [place] Counseling: I discussed with the patient the risks of tildrakizumab including but not limited to immunosuppression, malignancy, posterior leukoencephalopathy syndrome, and serious infections.  The patient understands that monitoring is required including a PPD at baseline and must alert us or the primary physician if symptoms of infection or other concerning signs are noted.

## 2022-09-07 NOTE — PROGRESS NOTES
Pt A&Ox4 and pleasant overnight. Coming on shift pt temp was only able to be obtained via rectal thermometer (93.7) but once pt blood glucose came back up and pt placed under multiple blankets with room heat turned up, temp was WDL through oral route. Pt own insulin monitor alarmed for low blood glucose around 0330 and accu-check resulted with a blood glucose of 60. Pt ate some snacks and came up to 132. Beard drained adequate amount yellow, clear urine. Pt complained of abdominal pain that was controlled with PRN tylenol and percocet.

## 2022-09-07 NOTE — PROCEDURES
Interventional Radiology  Procedure Note        9/7/2022 4:19 PM    Patient: Tammy Palm     Informed consent obtained    Diagnosis: CKD    Procedure(s): nontunneled hemodialysis catheter placement    Specimens removed:  none    Complications: None    Primary Physician: Shu Chavarria MD    Recommendations: N/A    Discharge Disposition: return  to floor    Full dictated report to follow    Shu Chavarria MD  Interventional Radiology  [facility]  4:19 PM, 9/7/2022

## 2022-09-07 NOTE — PROCEDURES
Hemodialysis / [phone]    Vitals Pre Post Assessment Pre Post   BP BP: 137/73 (09/07/22 1349) 138/76 LOC A&Ox4 No change   HR Pulse (Heart Rate): 75 (09/07/22 1349) 79 Lungs clear No change   Resp Resp Rate: 18 (09/07/22 1349) 18 Cardiac RRR No change   Temp Temp: 97.5 °F (36.4 °C) (09/07/22 1349) 98.1 Skin Tattoos / CDI No change   Weight    Edema 1+ No change   Tele status   Pain Pain Intensity 1: 8 (09/07/22 1317)      Orders   Duration: Start: 0182 End: 1622 Total: 2.5hrs   Dialyzer: Dialyzer/Set Up Inspection: Revaclear (09/07/22 1349)   K Bath: Dialysate K (mEq/L): 3 (09/07/22 1349)   Ca Bath: Dialysate CA (mEq/L): 2.5 (09/07/22 1349)   Na: Dialysate NA (mEq/L): 138 (09/07/22 1349)   Bicarb: Dialysate HCO3 (mEq/L): 30 (09/07/22 1349)   Target Fluid Removal: Goal/Amount of Fluid to Remove (mL): 1500 mL (09/07/22 1349)     Access   Type & Location: Jennifer Hopkins / PC : Dressing CDI. No s/s of infection. Both lumens aspirate & flush well. Running well at . SBAR received from Primary RN. Pt arrived to HD suite A&Ox4. Consent signed & on file. Each catheter limb disinfected per p&p, caps removed, hubs disinfected per p&p. Each lumen aspirated for blood return and flushed with Normal Saline per policy. VSS. Dialysis Tx initiated. Comments:   Dressing CDI. No s/s of infection noted.                                      Labs   HBsAg (Antigen) / date: Unknown -  Drawn                                              HBsAb (Antibody) / date: Unknown -  Drawn   Source:    Obtained/Reviewed  Critical Results Called HGB   Date Value Ref Range Status   09/07/2022 8.2 (L) 12.1 - 17.0 g/dL Final     Potassium   Date Value Ref Range Status   09/07/2022 4.2 3.5 - 5.1 mmol/L Final     Calcium   Date Value Ref Range Status   09/07/2022 8.3 (L) 8.5 - 10.1 MG/DL Final     BUN   Date Value Ref Range Status   09/07/2022 75 (H) 6 - 20 MG/DL Final     Creatinine   Date Value Ref Range Status   09/07/2022 5.50 (H) 0.70 - 1.30 MG/DL Final        Meds Given   Name Dose Route   None ordered                 Adequacy / Fluid    Total Liters Process: 36.2   Net Fluid Removed: 1500mL      Comments   Time Out Done:   (Time) 1344   Admitting Diagnosis: Respiratory Failure   Consent obtained/signed: Informed Consent Verified: Yes (09/07/22 6236)   Machine / RO # Machine Number: [id number] (09/07/22 0033)   Primary Nurse Rpt Pre: Kamla Ni RN   Primary Nurse Rpt Post: Shayy Mcarthur RN   Pt Education: procedural   Care Plan: On going   Pts outpatient clinic: TBD     Tx Summary  5700:  Saint[facility] Jules / PC : Dressing CDI. No s/s of infection. Both lumens aspirate & flush well. Running well at . SBAR received from Primary RN. Pt arrived to HD suite A&Ox4. Consent signed & on file. Each catheter limb disinfected per p&p, caps removed, hubs disinfected per p&p. Each lumen aspirated for blood return and flushed with Normal Saline per policy. VSS. Dialysis Tx initiated. 1400:  Pt resting  1415:  Pt resting  1430:  Pt resting  1445:  Pt resting  1500:  Pt resting  1515:  Pt resting  1530:  Pt resting  1545:  Pt resting  1600:  Pt resting  1615:  Pt resting  1622: Tx ended. VSS. Each dialysis catheter limb disinfected per p&p, all possible blood returned per p&p, and each dialysis hub disinfected per p&p. Each lumen flushed, post dialysis catheter Heparin dwell instilled per order, and caps applied. Bed locked and in the lowest position, call bell and belongings in reach. SBAR given to Primary, ASAD. Patient is stable at time of their/ my departure. All Dialysis related medications have been reviewed. Comments:  Assessment performed by RN. Procedure and documentation observed and reviewed by Jazlyn Cochran RN.

## 2022-09-07 NOTE — ROUTINE PROCESS
Arrived into the xray recovery area via stretcher and accompanied with transporter. Here today for a Francis Catheter placement and Thoracentesis.

## 2022-09-07 NOTE — PROGRESS NOTES
Bedside and Verbal shift change report given to Dipesh Donald 71 (oncoming nurse) by Corina Echevarria RN (offgoing nurse). Report included the following information SBAR, Kardex, Procedure Summary, Intake/Output, MAR, Recent Results, and Cardiac Rhythm NSR .

## 2022-09-07 NOTE — PROGRESS NOTES
Patient back to the floor from IR.      1320- Patient clamp on hansen bag not working. No cathter bag attached.

## 2022-09-07 NOTE — PROGRESS NOTES
Occupational Therapy   Chart reviewed for OT eval; Patient with catheter placement and thoracentesis earlier today and now at HD. Will retry tomorrow.  Lalita Daivson OTR/L

## 2022-09-08 ENCOUNTER — APPOINTMENT (OUTPATIENT)
Dept: GENERAL RADIOLOGY | Age: 42
DRG: 470 | End: 2022-09-08
Attending: INTERNAL MEDICINE
Payer: COMMERCIAL

## 2022-09-08 LAB
ALBUMIN SERPL-MCNC: 2.4 G/DL (ref 3.5–5)
ANION GAP SERPL CALC-SCNC: 6 MMOL/L (ref 5–15)
BUN SERPL-MCNC: 48 MG/DL (ref 6–20)
BUN/CREAT SERPL: 11 (ref 12–20)
CALCIUM SERPL-MCNC: 8.3 MG/DL (ref 8.5–10.1)
CHLORIDE SERPL-SCNC: 102 MMOL/L (ref 97–108)
CO2 SERPL-SCNC: 26 MMOL/L (ref 21–32)
CREAT SERPL-MCNC: 4.29 MG/DL (ref 0.7–1.3)
ERYTHROCYTE [DISTWIDTH] IN BLOOD BY AUTOMATED COUNT: 14.8 % (ref 11.5–14.5)
GLUCOSE BLD STRIP.AUTO-MCNC: 268 MG/DL (ref 65–117)
GLUCOSE BLD STRIP.AUTO-MCNC: 357 MG/DL (ref 65–117)
GLUCOSE BLD STRIP.AUTO-MCNC: 77 MG/DL (ref 65–117)
GLUCOSE BLD STRIP.AUTO-MCNC: 82 MG/DL (ref 65–117)
GLUCOSE BLD STRIP.AUTO-MCNC: 91 MG/DL (ref 65–117)
GLUCOSE SERPL-MCNC: 68 MG/DL (ref 65–100)
HBV CORE AB SERPL QL IA: NEGATIVE
HCT VFR BLD AUTO: 25.9 % (ref 36.6–50.3)
HGB BLD-MCNC: 8.2 G/DL (ref 12.1–17)
MCH RBC QN AUTO: 27.7 PG (ref 26–34)
MCHC RBC AUTO-ENTMCNC: 31.7 G/DL (ref 30–36.5)
MCV RBC AUTO: 87.5 FL (ref 80–99)
NRBC # BLD: 0 K/UL (ref 0–0.01)
NRBC BLD-RTO: 0 PER 100 WBC
PHOSPHATE SERPL-MCNC: 5 MG/DL (ref 2.6–4.7)
PLATELET # BLD AUTO: 248 K/UL (ref 150–400)
PMV BLD AUTO: 11.8 FL (ref 8.9–12.9)
POTASSIUM SERPL-SCNC: 4.3 MMOL/L (ref 3.5–5.1)
RBC # BLD AUTO: 2.96 M/UL (ref 4.1–5.7)
SERVICE CMNT-IMP: ABNORMAL
SERVICE CMNT-IMP: ABNORMAL
SERVICE CMNT-IMP: NORMAL
SODIUM SERPL-SCNC: 134 MMOL/L (ref 136–145)
WBC # BLD AUTO: 8.3 K/UL (ref 4.1–11.1)

## 2022-09-08 PROCEDURE — 74011636637 HC RX REV CODE- 636/637: Performed by: NURSE PRACTITIONER

## 2022-09-08 PROCEDURE — 74011250636 HC RX REV CODE- 250/636: Performed by: INTERNAL MEDICINE

## 2022-09-08 PROCEDURE — 94760 N-INVAS EAR/PLS OXIMETRY 1: CPT

## 2022-09-08 PROCEDURE — 80069 RENAL FUNCTION PANEL: CPT

## 2022-09-08 PROCEDURE — 74011250637 HC RX REV CODE- 250/637: Performed by: INTERNAL MEDICINE

## 2022-09-08 PROCEDURE — 71045 X-RAY EXAM CHEST 1 VIEW: CPT

## 2022-09-08 PROCEDURE — 74011000250 HC RX REV CODE- 250: Performed by: INTERNAL MEDICINE

## 2022-09-08 PROCEDURE — 90935 HEMODIALYSIS ONE EVALUATION: CPT

## 2022-09-08 PROCEDURE — 82962 GLUCOSE BLOOD TEST: CPT

## 2022-09-08 PROCEDURE — 65270000029 HC RM PRIVATE

## 2022-09-08 PROCEDURE — 74011250636 HC RX REV CODE- 250/636: Performed by: NURSE PRACTITIONER

## 2022-09-08 PROCEDURE — 85027 COMPLETE CBC AUTOMATED: CPT

## 2022-09-08 PROCEDURE — 74011636637 HC RX REV CODE- 636/637: Performed by: INTERNAL MEDICINE

## 2022-09-08 PROCEDURE — 51798 US URINE CAPACITY MEASURE: CPT

## 2022-09-08 PROCEDURE — 77010033678 HC OXYGEN DAILY

## 2022-09-08 PROCEDURE — 36415 COLL VENOUS BLD VENIPUNCTURE: CPT

## 2022-09-08 RX ORDER — INSULIN LISPRO 100 [IU]/ML
2 INJECTION, SOLUTION INTRAVENOUS; SUBCUTANEOUS ONCE
Status: COMPLETED | OUTPATIENT
Start: 2022-09-08 | End: 2022-09-08

## 2022-09-08 RX ORDER — HEPARIN SODIUM 1000 [USP'U]/ML
2600 INJECTION, SOLUTION INTRAVENOUS; SUBCUTANEOUS
Status: DISCONTINUED | OUTPATIENT
Start: 2022-09-08 | End: 2022-09-12 | Stop reason: HOSPADM

## 2022-09-08 RX ORDER — HYDROMORPHONE HYDROCHLORIDE 1 MG/ML
0.5 INJECTION, SOLUTION INTRAMUSCULAR; INTRAVENOUS; SUBCUTANEOUS ONCE
Status: COMPLETED | OUTPATIENT
Start: 2022-09-08 | End: 2022-09-08

## 2022-09-08 RX ADMIN — CARVEDILOL 12.5 MG: 12.5 TABLET, FILM COATED ORAL at 17:51

## 2022-09-08 RX ADMIN — OXYCODONE AND ACETAMINOPHEN 1 TABLET: 5; 325 TABLET ORAL at 22:17

## 2022-09-08 RX ADMIN — BUMETANIDE 2 MG: 0.25 INJECTION INTRAMUSCULAR; INTRAVENOUS at 22:16

## 2022-09-08 RX ADMIN — OXYCODONE AND ACETAMINOPHEN 1 TABLET: 5; 325 TABLET ORAL at 09:16

## 2022-09-08 RX ADMIN — BUMETANIDE 2 MG: 0.25 INJECTION INTRAMUSCULAR; INTRAVENOUS at 15:41

## 2022-09-08 RX ADMIN — OXYCODONE AND ACETAMINOPHEN 1 TABLET: 5; 325 TABLET ORAL at 15:41

## 2022-09-08 RX ADMIN — HYDROMORPHONE HYDROCHLORIDE 0.5 MG: 1 INJECTION, SOLUTION INTRAMUSCULAR; INTRAVENOUS; SUBCUTANEOUS at 01:05

## 2022-09-08 RX ADMIN — BUMETANIDE 2 MG: 0.25 INJECTION INTRAMUSCULAR; INTRAVENOUS at 07:03

## 2022-09-08 RX ADMIN — Medication 2 UNITS: at 22:17

## 2022-09-08 RX ADMIN — HEPARIN SODIUM 5000 UNITS: 5000 INJECTION INTRAVENOUS; SUBCUTANEOUS at 07:02

## 2022-09-08 RX ADMIN — PANTOPRAZOLE SODIUM 40 MG: 40 TABLET, DELAYED RELEASE ORAL at 07:03

## 2022-09-08 RX ADMIN — INSULIN GLARGINE 15 UNITS: 100 INJECTION, SOLUTION SUBCUTANEOUS at 22:17

## 2022-09-08 RX ADMIN — EPOETIN ALFA-EPBX 12000 UNITS: 10000 INJECTION, SOLUTION INTRAVENOUS; SUBCUTANEOUS at 22:16

## 2022-09-08 RX ADMIN — HEPARIN SODIUM 2600 UNITS: 1000 INJECTION INTRAVENOUS; SUBCUTANEOUS at 10:17

## 2022-09-08 RX ADMIN — HYDRALAZINE HYDROCHLORIDE 100 MG: 50 TABLET, FILM COATED ORAL at 15:41

## 2022-09-08 RX ADMIN — OXYCODONE AND ACETAMINOPHEN 1 TABLET: 5; 325 TABLET ORAL at 02:33

## 2022-09-08 RX ADMIN — ROSUVASTATIN CALCIUM 10 MG: 10 TABLET, FILM COATED ORAL at 22:16

## 2022-09-08 RX ADMIN — PREGABALIN 75 MG: 50 CAPSULE ORAL at 17:51

## 2022-09-08 RX ADMIN — HYDRALAZINE HYDROCHLORIDE 100 MG: 50 TABLET, FILM COATED ORAL at 22:15

## 2022-09-08 NOTE — PROGRESS NOTES
Received notification from bedside RN about patient with regards to: sever abdominal pain not relieved by Tylenol and Percocet, requesting medication for relief  VS: /83, HR 87, RR 18, O2 sat 94% on NC 3 L    Intervention given: Dilaudid 0.5 mg IV x 1 dose ordered

## 2022-09-08 NOTE — PROGRESS NOTES
Hospitalist Progress Note    NAME: Bernard Duran   :  1980   MRN:  [id number]       Assessment / Plan:  cute Hypoxic Respiratory Failure  2/2 to bilateral pleural effusions  Arden on ckd 4  Started on hemodialysis on   CT chest with moderate bilateral effusions L>R  On 3L O2, keep supplemental O2 for sats >90%  Continue with IV Lasix, he is oliguric  Troponin wnl, EKG without acute st segment changes  Pro BNP 5,688  ECHO with EF 14-79%, normal diastolic function ()  No indication to repeat ECHO at this time  No improvement despite IV Lasix, nephrology decided to start hemodialysis via [facility] his first HD treatment ton , got his second treatment today  Is to place a permacath tomorrow and plan for outpatient hemodialysis set up  We will repeat chest x-ray, if persistent pleural effusion will consider thoracocentesis  N.p.o. after midnight    Urinary retention  Beard in place from last admission  Cont. tamsulosin    DM Lantus 15 units SSI  HTN- cont. meds  Normocytic Anemia At baseline Hgb monitor  Correct Electrolytes as needed  PT/OT   CM/SW    Code Status: full  Surrogate Decision Maker: Cheo Elaine  [phone]    DVT Prophylaxis: heparin  GI Prophylaxis: not indicated    Baseline: home IADL's    25.0 - 29.9 Overweight / Body mass index is 26.58 kg/m². Estimated discharge date:   Barriers:    Code status: Full  Prophylaxis: Hep SQ  Recommended Disposition: Home w/Family     Subjective:     Chief Complaint / Reason for Physician Visit  Follow-up acute respiratory failure with hypoxia, worsening CKD. Seen after his second session of hemodialysis  Tolerating it well  discussed with RN events overnight.      Review of Systems:  Symptom Y/N Comments  Symptom Y/N Comments   Fever/Chills n   Chest Pain n    Poor Appetite    Edema     Cough n   Abdominal Pain n    Sputum    Joint Pain     SOB/JOHNSTON y   Pruritis/Rash     Nausea/vomit n   Tolerating PT/OT     Diarrhea n Tolerating Diet n    Constipation    Other       Could NOT obtain due to:      Objective:     VITALS:   Last 24hrs VS reviewed since prior progress note. Most recent are:  Patient Vitals for the past 24 hrs:   Temp Pulse Resp BP SpO2   09/08/22 0845 -- 80 16 130/76 --   09/08/22 0830 -- 81 18 139/76 --   09/08/22 0815 -- 84 18 (!) 144/75 --   09/08/22 0800 98.3 °F (36.8 °C) 84 18 139/75 --   09/08/22 0300 98.1 °F (36.7 °C) 88 18 (!) 151/82 94 %   09/07/22 2248 98.3 °F (36.8 °C) 87 18 (!) 149/83 94 %   09/07/22 2016 98.8 °F (37.1 °C) 84 16 110/60 92 %   09/07/22 1648 97.9 °F (36.6 °C) 81 14 (!) 140/85 98 %   09/07/22 1622 98.1 °F (36.7 °C) 79 18 138/76 --   09/07/22 1615 -- 78 18 115/75 --   09/07/22 1600 -- 80 18 131/69 --   09/07/22 1545 -- 80 18 111/70 --   09/07/22 1530 -- 76 18 123/63 --   09/07/22 1515 -- 74 18 (!) 124/45 --   09/07/22 1500 -- 75 18 (!) 120/58 --   09/07/22 1445 -- 73 18 119/65 --   09/07/22 1430 -- 76 18 116/61 --   09/07/22 1415 -- 74 18 111/62 --   09/07/22 1400 -- 74 18 130/68 --   09/07/22 1349 97.5 °F (36.4 °C) 75 18 137/73 --   09/07/22 1317 97.4 °F (36.3 °C) 74 20 136/84 93 %   09/07/22 1150 -- 78 13 (!) 140/74 92 %   09/07/22 1128 -- 81 15 (!) 145/77 92 %         Intake/Output Summary (Last 24 hours) at 9/8/2022 0918  Last data filed at 9/8/2022 0650  Gross per 24 hour   Intake --   Output 3050 ml   Net -3050 ml          I had a face to face encounter and independently examined this patient on 9/8/2022, as outlined below:  PHYSICAL EXAM:  General: WD, WN. Alert, cooperative, no acute distress    EENT:  EOMI. Anicteric sclerae. MMM  Resp:  CTA bilaterally, no wheezing or rales. No accessory muscle use  CV:  Regular  rhythm,  No edema  GI:  Soft, Non distended, Non tender. +Bowel sounds  Neurologic:  Alert and oriented X 3, normal speech,   Psych:   Good insight. Not anxious nor agitated  Skin:  No rashes.   No jaundice    Reviewed most current lab test results and cultures YES  Reviewed most current radiology test results   YES  Review and summation of old records today    NO  Reviewed patient's current orders and MAR    YES  PMH/SH reviewed - no change compared to H&P  ________________________________________________________________________  Care Plan discussed with:    Comments   Patient x    Family      RN x    Care Manager     Consultant                        Multidiciplinary team rounds were held today with , nursing, pharmacist and clinical coordinator. Patient's plan of care was discussed; medications were reviewed and discharge planning was addressed. ________________________________________________________________________  Total NON critical care TIME:  35   Minutes    Total CRITICAL CARE TIME Spent:   Minutes non procedure based      Comments   >50% of visit spent in counseling and coordination of care     ________________________________________________________________________  Isela Hargrove MD     Procedures: see electronic medical records for all procedures/Xrays and details which were not copied into this note but were reviewed prior to creation of Plan. LABS:  I reviewed today's most current labs and imaging studies.   Pertinent labs include:  Recent Labs     09/08/22 0244 09/07/22  0337 09/06/22  0916   WBC 8.3 7.9 11.9*   HGB 8.2* 8.2* 8.9*   HCT 25.9* 25.2* 27.1*    235 245       Recent Labs     09/08/22  0244 09/07/22  0337 09/06/22  0916   * 136 136   K 4.3 4.2 4.1    101 100   CO2 26 28 26   GLU 68 69 148*   BUN 48* 75* 69*   CREA 4.29* 5.50* 5.56*   CA 8.3* 8.3* 8.6   PHOS 5.0* 7.7*  --    ALB 2.4* 2.5* 2.7*   TBILI  --   --  0.3   ALT  --   --  47         Signed: Isela Hargrove MD

## 2022-09-08 NOTE — PROCEDURES
Hemodialysis / [phone]    Vitals Pre Post Assessment Pre Post   BP BP: (!) 144/75 (09/08/22 0815)   149/84 LOC A & O x 4 No change   HR Pulse (Heart Rate): 84 (09/08/22 0815) 85 Lungs Rales RL LL No change   Resp Resp Rate: 18 (09/08/22 0815) 18 Cardiac S1S2 No change   Temp Temp: 98.3 °F (36.8 °C) (09/08/22 0800) 98.1 oral Skin Warm dry & intact No change   Weight  N/a N/a Edema Trace Lower ext No change   Tele status yes yes Pain Pain Intensity 1: 8 (09/07/22 2017) No change     Orders   Duration: Start: 0800 End: 1100 Total: 3.0   Dialyzer: Dialyzer/Set Up Inspection: Revaclear (09/08/22 0800)   K Bath: Dialysate K (mEq/L): 3 (09/08/22 0800)   Ca Bath: Dialysate CA (mEq/L): 2.5 (09/08/22 0800)   Na: Dialysate NA (mEq/L): 138 (09/08/22 0800)   Bicarb: Dialysate HCO3 (mEq/L): 35 (09/08/22 0800)   Target Fluid Removal: Goal/Amount of Fluid to Remove (mL): 2000 mL (09/08/22 0800)     Access   Type & Location: Right IJ cvc   Comments:      No problems noted, no drainage/redness to site.                                   Labs   HBsAg (Antigen) / date:       Neg 9/7/22                                     HBsAb (Antibody) / date: Susc 9/7/22   Source: epic   Obtained/Reviewed  Critical Results Called HGB   Date Value Ref Range Status   09/08/2022 8.2 (L) 12.1 - 17.0 g/dL Final     Potassium   Date Value Ref Range Status   09/08/2022 4.3 3.5 - 5.1 mmol/L Final     Calcium   Date Value Ref Range Status   09/08/2022 8.3 (L) 8.5 - 10.1 MG/DL Final     BUN   Date Value Ref Range Status   09/08/2022 48 (H) 6 - 20 MG/DL Final     Comment:     INVESTIGATED PER DELTA CHECK PROTOCOL     Creatinine   Date Value Ref Range Status   09/08/2022 4.29 (H) 0.70 - 1.30 MG/DL Final        Meds Given   Name Dose Route   Heparin arterial  1.3ml iv   Heparin venous  1.3ml iv          Adequacy / Fluid    Total Liters Process: 2000ml   Net Fluid Removed: 51.2       Comments   Time Out Done:   (Time) 0700   Admitting Diagnosis: CKD stage IV with progression to ESRD  Volume overload  Acute hypoxic respiratory failure  Urinary retention status post Beard  Type 1 diabetes  Hypertension  Anemia  Pleural effusion   Consent obtained/signed: Informed Consent Verified: Yes (09/08/22 0800)   Machine / RO # Machine Number: [id number] (09/08/22 0800)   Primary Nurse Rpt Pre: Justice Muro   Primary Nurse Rpt Post: Salomon Carrasco   Pt Education: Access care   Care Plan: Continue HD   Pts outpatient clinic: Acute     Tx Summary Right IJ Francis  : Dressing CDI. No s/s of infection. Both lumens aspirate & flush well. Running well at . SBAR received from Primary RN. Pt arrived to HD suite A&Ox4. Consent signed & on file. 0800: Each catheter limb disinfected per p&p, caps removed, hubs disinfected per p&p. Each lumen aspirated for blood return and flushed with Normal Saline per policy. Labs drawn per request/ order. VSS. Dialysis Tx initiated. 0830: pt. Stable, lines secure and visible  0900: pt. Stable,lines secure and visible  0930: pt. Resting well. Lines secure  1000: pt. Stable,lines secure  1030: pt. Stable, lines secure and visible  1100: Tx ended. VSS. Each dialysis catheter limb disinfected per p&p, all possible blood returned per p&p, and each dialysis hub disinfected per p&p. Each lumen flushed, post dialysis catheter Heparin dwell instilled per order, and caps applied. Bed locked and in the lowest position, call bell and belongings in reach. SBAR given to Primary, RN. Patient is stable at time of their/ my departure. All Dialysis related medications have been reviewed. Comments: RN reviewed LPN assessment and completed RN assessment. RN completed patient assessment. RN reviewed technicians vital signs and procedure note. Tx completed. Reviewed by ASAD Hodge

## 2022-09-08 NOTE — PROGRESS NOTES
Bedside and Verbal shift change report given to [address] Gallegos (oncoming nurse) by Keiry Miramontes RN (offgoing nurse). Report included the following information SBAR, Kardex, Intake/Output, MAR, and Cardiac Rhythm NSR .

## 2022-09-08 NOTE — PROGRESS NOTES
Nephrology Progress Note  [facility] / [address], Josee Ferraro, [address]  Phone - [phone]  Fax - [phone]                 Patient: Moraima Alfaro                   YOB: 1980        Date- 9/8/2022                      Admit Date: 9/6/2022  CC: Follow up for CKD stage IV with progression to ESRD          IMPRESSION & PLAN:   CKD stage IV with progression to ESRD  Volume overload  Acute hypoxic respiratory failure  Urinary retention status post Beard  Type 1 diabetes  Hypertension  Anemia  Pleural effusion    PLAN-  Patient tolerated hemodialysis treatment well. Plan for HD #2 today. HD #3 will be tomorrow. Will order for PermCath placement by IR by tomorrow  Continue Epogen for anemia  Also consulted  for placement at [facility] unit. Spoke to RN     Subjective: Interval History:   -Seen and examined this morning.  -Got HD# 2 today. -Remains short of breath    Objective:   Vitals:    09/08/22 1045 09/08/22 1100 09/08/22 1139 09/08/22 1159   BP: 139/86 (!) 149/84 (!) 145/75    Pulse: 82 85 87 87   Resp: 16 16 18    Temp:  98.1 °F (36.7 °C) 97.9 °F (36.6 °C)    TempSrc:  Oral     SpO2:   95%    Weight:       Height:          09/07 0701 - 09/08 0700  In: -   Out: 4157 [Urine:1550]  Last 3 Recorded Weights in this Encounter    09/06/22 0842   Weight: 81.6 kg (180 lb)      Physical exam:    GEN: NAD  NECK- Supple, no mass  RESP: No wheezing, Clear b/l  CVS: S1,S2  RRR  NEURO: Normal speech, Non focal  EXT: 1+ edema  HD access: Right IJ [place]      Chart reviewed. Pertinent Notes reviewed.      Data Review :  Recent Labs     09/08/22  0244 09/07/22  0337 09/06/22  0916   * 136 136   K 4.3 4.2 4.1    101 100   CO2 26 28 26   BUN 48* 75* 69*   CREA 4.29* 5.50* 5.56*   GLU 68 69 148*   CA 8.3* 8.3* 8.6   PHOS 5.0* 7.7*  --        Recent Labs     09/08/22  0244 09/07/22  0337 09/06/22  0916   WBC 8.3 7.9 11.9*   HGB 8.2* 8.2* 8.9*   HCT 25.9* 25.2* 27.1*    235 245       No results for input(s): FE, TIBC, PSAT, FERR in the last 72 hours.    Medication list  reviewed  Current Facility-Administered Medications   Medication Dose Route Frequency    heparin (porcine) 1,000 unit/mL injection 2,600 Units  2,600 Units Hemodialysis DIALYSIS PRN    amLODIPine (NORVASC) tablet 10 mg  10 mg Oral DAILY    carvediloL (COREG) tablet 12.5 mg  12.5 mg Oral BID WITH MEALS    rosuvastatin (CRESTOR) tablet 10 mg  10 mg Oral QHS    tamsulosin (FLOMAX) capsule 0.4 mg  0.4 mg Oral DAILY    hydrALAZINE (APRESOLINE) tablet 100 mg  100 mg Oral TID    pregabalin (LYRICA) capsule 75 mg  75 mg Oral BID    insulin glargine (LANTUS) injection 15 Units  15 Units SubCUTAneous QHS    acetaminophen (TYLENOL) tablet 650 mg  650 mg Oral Q6H PRN    ondansetron (ZOFRAN) injection 4 mg  4 mg IntraVENous Q4H PRN    [START ON 9/12/2022] cloNIDine (CATAPRES) 0.1 mg/24 hr patch 1 Patch  1 Patch TransDERmal Q7D    pantoprazole (PROTONIX) tablet 40 mg  40 mg Oral ACB    heparin (porcine) injection 5,000 Units  5,000 Units SubCUTAneous Q12H    insulin lispro (HUMALOG) injection   SubCUTAneous TIDAC    glucose chewable tablet 16 g  4 Tablet Oral PRN    glucagon (GLUCAGEN) injection 1 mg  1 mg IntraMUSCular PRN    dextrose 10% infusion 0-250 mL  0-250 mL IntraVENous PRN    oxyCODONE-acetaminophen (PERCOCET) 5-325 mg per tablet 1 Tablet  1 Tablet Oral Q6H PRN    epoetin rell-epbx (RETACRIT) 12,000 Units combo injection  12,000 Units SubCUTAneous Q TUE, THU & SAT    bumetanide (BUMEX) injection 2 mg  2 mg IntraVENous Q8H          Opal Boyle MD  9/8/2022

## 2022-09-08 NOTE — PROGRESS NOTES
Physical Therapy    Chart reviewed, patient KAJAL at HD. Will follow up as able. Update 1400: attempted to see for PT evaluation, patient in bed with 8/10 pain in abdomen and awaiting medication, declined full evaluation stating recently up to bathroom and wondering next steps for fluid removal, will follow up tomorrow as able. Encouraged OOB activity with nursing ORTEGA

## 2022-09-08 NOTE — PROGRESS NOTES
TRANSFER - OUT REPORT:    Verbal report given to Rahul Lucio RN(name) on Herbert Castañeda  being transferred to PCU(unit) for ordered procedure       Report consisted of patients Situation, Background, Assessment and   Recommendations(SBAR). Information from the following report(s) Kardex was reviewed with the receiving nurse. Opportunity for questions and clarification was provided.       Patient transported with:   [employer]

## 2022-09-08 NOTE — CONSULTS
Urology Consult    Patient: Len Sal MRN: [id number]  SSN: [id number]    YOB: 1980  Age: 46 y.o. Sex: male          Date of Consultation:  September 8, 2022  Requesting Physician: Author Rodolfo MD  Reason for Consultation: urinary retention            Assessment/Plan:  Felecia on CKD stage IV  Urinary retention hx with bph and suspected atonic bladder- hx of failed voiding trials    -continue flomax  -okay to conduct inpatient voiding trial per nursing  tomorrow AM- replace hansen for PVR>350ml  -tight glycemic control- if he has persistent elevated blood sugars he will most definitely burn out his bladder w/ atonic bladder  -he will need urologic OP follow up (possibly [facility]), this will need arranged by QM to see verify who will take his insurance  -no further urologic intervention, please call if needed    Supervising MD, Dr. Darwin Bonner       History of Present Illness:  Patient is a 46 y.o. male admitted 9/6/2022 to the hospital for Respiratory failure ([facility] [address] [J96.90]. He has a history of CKD stage IV, DM on insulin pump, Bipolar disorder who presents with sob for 1 day. Patient started with SOB and some chest pain yesterday morning. He denies cough, palpitations, fever chills, nausea or vomiting. His mom drove him to the hospital.He required NC on 3L and CT demonstrated bilateral pleural effusions so he was admitted.  on admission. Creat 5.5 on admission up from baseline, felecia on ckd stage IV, had HD per nephrology. he was recently admitted at [facility] in August for  DKA and acidosis along with FELECIA. He was admitted to ICU was placed on mechanical ventilation and was placed on DKA protocol. He was discharged 8/20 with hansen in place due to urinary retention, he was supposed to have it removed outpatient this week. Urology consulted. Known urologic pt of Dr. Hakan Dumas however has not followed up outpatient due to [facility] no longer taking his insurance.  Reference last office visit note for pertinent urologic hx. Pt seen in dialysis. Hansen draining clear yellow UA. Hansen has remained indwelling he reports for two weeks. Denies flank pain or hansen malfunction. He states his SOB and chest pain has greatly improved. BS remain stable due to insulin pump. Currently on HD  Chart and images reviewed. Ct a/p 9/6 with no acute  process or obstruction. Moderate bladder wall thickening. Creat now 4.29 on HD. Ckd anemia, other HD stable. No recent UA. Excellent UOP, ~3L    ==June 2022 [facility] office visit==  Meet Jones is a 46year old male who presents today for \"Retention\". He returns for follow-up. Previously seen by me for urinary retention and hx of emphysematous cystitis  Had some penile pain and meatal stenosis when he last saw me  his pvrs were in the 200 range  we planned to leave out catheter and perform renal ultrasound  since he last saw me he was admitted to hospital with blood sugars around 1000  reports he was told to stop flomax  reports he thinks flomax did help  he was on flomax and finsteride  No fevers no flank pain    PAST MEDICAL HISTORY:    Allergies: No known allergies. DENIES: Latex, Shellfish, X-Ray Dye, Iodine.      Medications: Flomax 0.4 mg capsule (tamsulosin) Take 1 capsule by mouth every night   duloxetine 60 mg capsule,delayed release(DR/EC) (duloxetine)   finasteride 5 mg tablet (finasteride)   carvedilol 12.5 mg tablet (carvedilol)   Lantus Solostar U-100 Insulin 100 unit/mL (3 mL) insulin pen (insulin glargine)   pregabalin 75 mg capsule (pregabalin)   metoprolol tartrate 25 mg tablet (metoprolol tartrate)   rosuvastatin 40 mg tablet (rosuvastatin)   insulin aspart U-100 100 unit/mL (3 mL) insulin pen (insulin aspart u-100)   pantoprazole 40 mg tablet,delayed release (DR/EC) (pantoprazole)   furosemide 40 mg tablet (furosemide)   ondansetron 4 mg tablet,disintegrating (ondansetron) DISSOLVE 1 TABLET IN MOUTH EVERY 8 HOURS AS NEEDED FOR NAUSEA FOR VOMITING  lisinopril 20 mg tablet (lisinopril)     Problems: UTI (ICD-599.0) (EDR48-X75.0)  Hydronephrosis (ICD-591) (OYL52-S08.30)  BPH with urinary obstruction (ICD-600.01) (PNC64-Q74.1)  Urinary retention (ICD-788.20) (GCT93-M29. 9)  Diabetes Mellitus (ICD-250.00) (PFN61-D97.9)    Illnesses: Diabetes, High Blood Pressure, and Kidney Problems. DENIES: Heart Disease, Pacemaker/Defibrillator, Lung Disease, Bowel Problems, Stroke/Seizure, Bleeding Problems, HIV, Hepatitis, or Cancer. Surgeries: DENIES pertinent  surgeries      Family History: DENIES: Prostate cancer, Kidney cancer, Kidney disease, Kidney stones. Social History: Unemployed. Other. Smoking status: former smoker. Does not drink alcohol. System Review: Admits to: Difficulty Walking, Leg Swelling, and Lower Extremity Weakness. DENIES: Unexplained Weight Loss, Dry Eyes, Dry Mouth, Shortness of Breath, Constipation, Involuntary Urine Loss, Dry Skin, Psychiatric Problems, Impaired Sex Drive, Easy Bleeding, Rash. EXAMINATION: Appearance: well-developed NAD Respiratory Effort: breathing easily Lower Extremity: no edema Abdomen/Flank: soft non-tender without masses; no CVA tenderness Liver/Spleen: no organomegaly Hernia: no ventral hernia     DIAGNOSTIC STUDIES:  IMPRESSION:    1. No hydronephrosis with Beard in place. 2. Normal appearance of the kidneys. 3. Decompressed bladder with nonspecific circumferential wall thickening. 4. Trace free pelvic fluid incidentally noted.     ELECTRONICALLY SIGNED BY: Kb Malone MD      URINALYSIS from Voided specimen  Urine Micro not done    Prescription(s) Today: Flomax 0.4 mg capsule (tamsulosin) Take 1 capsule by mouth every night   #90 capsule x 3,  06/14/2022, Teressa Hahn RN, SIGNED    IMPRESSION:    1. BPH WITH URINARY OBSTRUCTION (EOK76-F43.1) - Deteriorated: discussed restarting flomax since he felt this did help we perofrmed fill and pull today and he was able to completely empty we will see him back in a couple weeks with renal utlrasound. 2. DIABETES MELLITUS (LMA93-V06.9) - Deteriorated: poorly controlled he was admitted since he last saw me with sugars around 1000 we discussed blood surgar control and that if he has persistent elevated blood sugars he will most definitely burn out his bladder w/ atonic bladder  he may have already rendered it atonic. 3. URINARY RETENTION (FRI45-M01.9) - Deteriorated: improved today  he passed voiding trial.         cc: Alen Stovall MD  Today's Services  E&M Service    Upcoming Orders  Return Office Visit - with Nestor Richardson MD in 2 weeks  U/S; Renal, UA        ]      Electronically signed by Nestor Richardson MD on 06/14/2022 at 3:42 PM    ________________________________________________________________________        Past Medical History:  No Known Allergies   Prior to Admission medications    Medication Sig Start Date End Date Taking? Authorizing Provider   acetaminophen-codeine (Tylenol-Codeine #3) 300-30 mg per tablet Take 1 Tablet by mouth every six (6) hours as needed for Pain for up to 30 days. Max Daily Amount: 4 Tablets. 8/25/22 9/24/22  Augie Gonzales MD   bacitracin zinc (BACITRACIN) ointment Apply  to affected area two (2) times a day. 8/25/22   Augie Gonzales MD   insulin glargine (Lantus Solostar U-100 Insulin) 100 unit/mL (3 mL) inpn 16 Units by SubCUTAneous route as needed (pump malfunction). 8/23/22   Nahum Yanes MD   Ketone Blood Test strp 50 Each by Does Not Apply route as needed for PRN Reason (Other) (as needed for suspected dka). 8/23/22   Nahum Yanes MD   pantoprazole (PROTONIX) 40 mg tablet Take 1 Tablet by mouth Daily (before breakfast). 8/21/22   Marija Mcmullen MD   sodium bicarbonate 650 mg tablet Take 1 Tablet by mouth three (3) times daily for 30 days. 8/20/22 9/19/22  Marija Mcmullen MD   furosemide (Lasix) 80 mg tablet Take 1 Tablet by mouth two (2) times a day for 30 days.  8/20/22 9/19/22  Marija Mcmullen MD   hydrALAZINE (APRESOLINE) 100 mg tablet Take 1 Tablet by mouth three (3) times daily for 30 days. 8/20/22 9/19/22  Negro Peacock MD   finasteride (PROSCAR) 5 mg tablet Take 1 Tablet by mouth in the morning. 7/25/22   Carlos Johnson MD   insulin aspart U-100 (NovoLOG U-100 Insulin aspart) 100 unit/mL injection Use as instructed via insulin pump. Dx code E10.65 max daily dose 50U 7/15/22   Stevan Nogueira MD   rosuvastatin (CRESTOR) 40 mg tablet  6/21/22   Provider, Historical   pregabalin (Lyrica) 75 mg capsule Take 1 Capsule by mouth two (2) times a day. Max Daily Amount: 150 mg. 7/14/22   Carlos Johnson MD   Ronnald Real (Ultra-Light Rollator) misc Use while ambulating 7/14/22   Carlos Johnson MD   amLODIPine (NORVASC) 10 mg tablet Take 1 Tablet by mouth daily. 7/14/22   Carlos Johnson MD   cloNIDine (CATAPRES) 0.1 mg/24 hr ptwk 1 Patch by TransDERmal route every seven (7) days. 7/14/22   Carlos Johnson MD   Dexcom G6  misc Use with the dexcom device to check blood sugars 4 times a day 7/1/22   Stevan Nogueira MD   DULoxetine (CYMBALTA) 60 mg capsule Take 1 capsule by mouth once daily 6/22/22   Carlos Johnson MD   Dexcom G6 Sensor brandi Use as directed every 10 days 6/21/22   Stevan Nogueira MD   Dexcom G6 Transmitter brandi Use as directed 6/21/22   Stevan Nogueira MD   FreeStyle Connor 14 Day Sensor kit Use as directed every 14 days  Patient not taking: No sig reported 6/21/22   Stevan Nogueira MD   Insulin Needles, Disposable, 31 gauge x 5/16\" ndle Dx code E11.65, use 6x daily 6/21/22   Stevan Nogueira MD   insulin glargine (LANTUS) 100 unit/mL injection 16 Units by SubCUTAneous route daily. 6/21/22   Stevan Nogueira MD   insulin aspart U-100 (NovoLOG Flexpen U-100 Insulin) 100 unit/mL (3 mL) inpn Take 1 unit for every 15 grams of carbohydrates, 1 unit for every 50 points >150.  Max daily dose 25U. 6/21/22   Stevan Nogueira MD   carvediloL (COREG) 12.5 mg tablet Take 1 Tablet by mouth two (2) times daily (with meals). 5/19/22   Bradley Hartley MD   tamsulosin (FLOMAX) 0.4 mg capsule Take 0.4 mg by mouth daily. Provider, Historical   pen needle, diabetic 30 gauge x 3/16\" ndle 5 Units by Does Not Apply route Before breakfast, lunch, and dinner. 2/23/22   Bradley Hartley MD      PMHx:  has a past medical history of Bipolar 1 disorder, depressed ([address] Bipolar disorder ([address] Chronic kidney disease, stage 3a ([address] Depression, Diabetes ([address] DKA, type 1 ([address] (1/27/2013), DKA, type 1 ([address] H/O noncompliance with medical treatment, presenting hazards to health, MRSA (methicillin resistant staph aureus) culture positive, MRSA (methicillin resistant Staphylococcus aureus), Noncompliance with medication regimen, Second hand smoke exposure, Seizure ([address] and Seizures ([address] (2006 or 2007). PSurgHx:  has a past surgical history that includes hx orthopaedic (Left); hx heent; upper gi endoscopy,biopsy (11/20/2018); and ir insert non tunl cvc over 5 yrs (9/7/2022). PSocHx:  reports that he quit smoking about 2 years ago. His smoking use included cigarettes. He started smoking about 22 years ago. He has a 1.60 pack-year smoking history. He has never used smokeless tobacco. He reports that he does not drink alcohol and does not use drugs. ROS:  Admission ROS by Rose Freeman MD from 9/6/2022 were reviewed with the patient and changes (other than per HPI) include: none.     Physical Exam    General Appearance: NAD, awake  HENT: atraumatic, normal ears  Cardiovascular: not tachycardic, no LE edema  Respiratory: no distress, room air  Abdomen: soft, no suprapubic fullness or tenderness  : no CVA tenderness, hansen clear yellow  Extremities: moves all  Musculoskeletal: normal alignment of neck and head  Neuro: Appropriate, no focal neurological deficits  Mood/Affect: appropriate, A&O x 3      Lab Results   Component Value Date/Time    WBC 8.3 09/08/2022 02:44 AM    HCT 25.9 (L) 09/08/2022 02:44 AM PLATELET 600 35/51/9980 02:44 AM    Sodium 134 (L) 09/08/2022 02:44 AM    Potassium 4.3 09/08/2022 02:44 AM    Chloride 102 09/08/2022 02:44 AM    CO2 26 09/08/2022 02:44 AM    BUN 48 (H) 09/08/2022 02:44 AM    Creatinine 4.29 (H) 09/08/2022 02:44 AM    Glucose 68 09/08/2022 02:44 AM    Calcium 8.3 (L) 09/08/2022 02:44 AM    Magnesium 2.3 08/17/2022 01:06 AM    INR 1.0 02/08/2022 07:22 PM    Prostate Specific Ag 0.2 06/02/2022 11:54 AM       UA:   Lab Results   Component Value Date/Time    Color YELLOW/STRAW 08/12/2022 12:40 AM    Appearance CLEAR 08/12/2022 12:40 AM    Specific gravity 1.017 08/12/2022 12:40 AM    Specific gravity 1.020 05/08/2022 09:06 PM    pH (UA) 5.5 08/12/2022 12:40 AM    Protein 300 (A) 08/12/2022 12:40 AM    Glucose >1,000 (A) 08/12/2022 12:40 AM    Ketone 15 (A) 08/12/2022 12:40 AM    Bilirubin Negative 08/12/2022 12:40 AM    Urobilinogen 0.2 08/12/2022 12:40 AM    Nitrites Negative 08/12/2022 12:40 AM    Leukocyte Esterase Negative 08/12/2022 12:40 AM    Epithelial cells FEW 08/12/2022 12:40 AM    Bacteria Negative 08/12/2022 12:40 AM    WBC 5-10 08/12/2022 12:40 AM    RBC 5-10 08/12/2022 12:40 AM           Signed By: Celso Hunter NP  - September 8, 2022

## 2022-09-08 NOTE — PROGRESS NOTES
Occupational Therapy    Update 1400: attempted to see for OT evaluation, patient in bed with 8/10 pain in abdomen and awaiting medication, declined full evaluation stating recently up to bathroom and wondering next steps for fluid removal, will follow up tomorrow as able. Encouraged OOB activity with nursing A and participation with ADLs as able to maintain strength and endurance. Chart reviewed, patient KAJAL at HD. Will follow up as able. Bonifacio Ortega.  MS Mily OTR/L

## 2022-09-09 ENCOUNTER — APPOINTMENT (OUTPATIENT)
Dept: INTERVENTIONAL RADIOLOGY/VASCULAR | Age: 42
DRG: 470 | End: 2022-09-09
Attending: INTERNAL MEDICINE
Payer: COMMERCIAL

## 2022-09-09 ENCOUNTER — APPOINTMENT (OUTPATIENT)
Dept: GENERAL RADIOLOGY | Age: 42
DRG: 470 | End: 2022-09-09
Attending: STUDENT IN AN ORGANIZED HEALTH CARE EDUCATION/TRAINING PROGRAM
Payer: COMMERCIAL

## 2022-09-09 ENCOUNTER — APPOINTMENT (OUTPATIENT)
Dept: ULTRASOUND IMAGING | Age: 42
DRG: 470 | End: 2022-09-09
Attending: INTERNAL MEDICINE
Payer: COMMERCIAL

## 2022-09-09 LAB
ALBUMIN FLD-MCNC: 1.2 G/DL
ALBUMIN SERPL-MCNC: 2.3 G/DL (ref 3.5–5)
ANION GAP SERPL CALC-SCNC: 12 MMOL/L (ref 5–15)
ANION GAP SERPL CALC-SCNC: 8 MMOL/L (ref 5–15)
APPEARANCE FLD: ABNORMAL
BUN SERPL-MCNC: 37 MG/DL (ref 6–20)
BUN SERPL-MCNC: 39 MG/DL (ref 6–20)
BUN/CREAT SERPL: 11 (ref 12–20)
BUN/CREAT SERPL: 11 (ref 12–20)
CALCIUM SERPL-MCNC: 8.8 MG/DL (ref 8.5–10.1)
CALCIUM SERPL-MCNC: 9 MG/DL (ref 8.5–10.1)
CHLORIDE SERPL-SCNC: 97 MMOL/L (ref 97–108)
CHLORIDE SERPL-SCNC: 97 MMOL/L (ref 97–108)
CO2 SERPL-SCNC: 25 MMOL/L (ref 21–32)
CO2 SERPL-SCNC: 28 MMOL/L (ref 21–32)
COLOR FLD: ABNORMAL
CREAT SERPL-MCNC: 3.41 MG/DL (ref 0.7–1.3)
CREAT SERPL-MCNC: 3.42 MG/DL (ref 0.7–1.3)
EOSINOPHIL NFR FLD MANUAL: 1 %
ERYTHROCYTE [DISTWIDTH] IN BLOOD BY AUTOMATED COUNT: 14.3 % (ref 11.5–14.5)
GLUCOSE BLD STRIP.AUTO-MCNC: 161 MG/DL (ref 65–117)
GLUCOSE BLD STRIP.AUTO-MCNC: 327 MG/DL (ref 65–117)
GLUCOSE BLD STRIP.AUTO-MCNC: 345 MG/DL (ref 65–117)
GLUCOSE FLD-MCNC: 285 MG/DL
GLUCOSE SERPL-MCNC: 344 MG/DL (ref 65–100)
GLUCOSE SERPL-MCNC: 352 MG/DL (ref 65–100)
HCT VFR BLD AUTO: 27.3 % (ref 36.6–50.3)
HGB BLD-MCNC: 8.9 G/DL (ref 12.1–17)
LDH FLD L TO P-CCNC: 171 U/L
LYMPHOCYTES NFR FLD: 7 %
MCH RBC QN AUTO: 28.6 PG (ref 26–34)
MCHC RBC AUTO-ENTMCNC: 32.6 G/DL (ref 30–36.5)
MCV RBC AUTO: 87.8 FL (ref 80–99)
MONOS+MACROS NFR FLD: 13 %
NEUTROPHILS NFR FLD: 79 %
NRBC # BLD: 0 K/UL (ref 0–0.01)
NRBC BLD-RTO: 0 PER 100 WBC
NUC CELL # FLD: 3385 /CU MM
PHOSPHATE SERPL-MCNC: 3.7 MG/DL (ref 2.6–4.7)
PLATELET # BLD AUTO: 235 K/UL (ref 150–400)
PMV BLD AUTO: 11.7 FL (ref 8.9–12.9)
POTASSIUM SERPL-SCNC: 4.3 MMOL/L (ref 3.5–5.1)
POTASSIUM SERPL-SCNC: 4.3 MMOL/L (ref 3.5–5.1)
PROT FLD-MCNC: 2.6 G/DL
RBC # BLD AUTO: 3.11 M/UL (ref 4.1–5.7)
RBC # FLD: >100 /CU MM
SERVICE CMNT-IMP: ABNORMAL
SODIUM SERPL-SCNC: 133 MMOL/L (ref 136–145)
SODIUM SERPL-SCNC: 134 MMOL/L (ref 136–145)
SPECIMEN SOURCE FLD: ABNORMAL
SPECIMEN SOURCE FLD: NORMAL
WBC # BLD AUTO: 8 K/UL (ref 4.1–11.1)

## 2022-09-09 PROCEDURE — 90935 HEMODIALYSIS ONE EVALUATION: CPT

## 2022-09-09 PROCEDURE — 74011000250 HC RX REV CODE- 250: Performed by: STUDENT IN AN ORGANIZED HEALTH CARE EDUCATION/TRAINING PROGRAM

## 2022-09-09 PROCEDURE — 77030002996 HC SUT SLK J&J -A

## 2022-09-09 PROCEDURE — 87205 SMEAR GRAM STAIN: CPT

## 2022-09-09 PROCEDURE — 77030032034 US THORACENTESIS LT NDL W IMAGE

## 2022-09-09 PROCEDURE — 74011000250 HC RX REV CODE- 250: Performed by: INTERNAL MEDICINE

## 2022-09-09 PROCEDURE — 82042 OTHER SOURCE ALBUMIN QUAN EA: CPT

## 2022-09-09 PROCEDURE — 74011250636 HC RX REV CODE- 250/636: Performed by: INTERNAL MEDICINE

## 2022-09-09 PROCEDURE — 82945 GLUCOSE OTHER FLUID: CPT

## 2022-09-09 PROCEDURE — 74011250637 HC RX REV CODE- 250/637: Performed by: EMERGENCY MEDICINE

## 2022-09-09 PROCEDURE — 36415 COLL VENOUS BLD VENIPUNCTURE: CPT

## 2022-09-09 PROCEDURE — 65270000029 HC RM PRIVATE

## 2022-09-09 PROCEDURE — 80048 BASIC METABOLIC PNL TOTAL CA: CPT

## 2022-09-09 PROCEDURE — 74011250636 HC RX REV CODE- 250/636: Performed by: EMERGENCY MEDICINE

## 2022-09-09 PROCEDURE — 83615 LACTATE (LD) (LDH) ENZYME: CPT

## 2022-09-09 PROCEDURE — 74011636637 HC RX REV CODE- 636/637: Performed by: INTERNAL MEDICINE

## 2022-09-09 PROCEDURE — 84157 ASSAY OF PROTEIN OTHER: CPT

## 2022-09-09 PROCEDURE — C1750 CATH, HEMODIALYSIS,LONG-TERM: HCPCS

## 2022-09-09 PROCEDURE — 77001 FLUOROGUIDE FOR VEIN DEVICE: CPT

## 2022-09-09 PROCEDURE — 80069 RENAL FUNCTION PANEL: CPT

## 2022-09-09 PROCEDURE — 77030010507 HC ADH SKN DERMBND J&J -B

## 2022-09-09 PROCEDURE — 82962 GLUCOSE BLOOD TEST: CPT

## 2022-09-09 PROCEDURE — 74011250636 HC RX REV CODE- 250/636: Performed by: STUDENT IN AN ORGANIZED HEALTH CARE EDUCATION/TRAINING PROGRAM

## 2022-09-09 PROCEDURE — 89050 BODY FLUID CELL COUNT: CPT

## 2022-09-09 PROCEDURE — 74011250637 HC RX REV CODE- 250/637: Performed by: INTERNAL MEDICINE

## 2022-09-09 PROCEDURE — 71045 X-RAY EXAM CHEST 1 VIEW: CPT

## 2022-09-09 PROCEDURE — 94760 N-INVAS EAR/PLS OXIMETRY 1: CPT

## 2022-09-09 PROCEDURE — 2709999900 HC NON-CHARGEABLE SUPPLY

## 2022-09-09 PROCEDURE — 51798 US URINE CAPACITY MEASURE: CPT

## 2022-09-09 PROCEDURE — 74011250637 HC RX REV CODE- 250/637: Performed by: NURSE PRACTITIONER

## 2022-09-09 PROCEDURE — 85027 COMPLETE CBC AUTOMATED: CPT

## 2022-09-09 PROCEDURE — 77010033678 HC OXYGEN DAILY

## 2022-09-09 RX ORDER — LIDOCAINE HYDROCHLORIDE 20 MG/ML
10 INJECTION, SOLUTION INFILTRATION; PERINEURAL ONCE
Status: COMPLETED | OUTPATIENT
Start: 2022-09-09 | End: 2022-09-09

## 2022-09-09 RX ORDER — ONDANSETRON 2 MG/ML
4 INJECTION INTRAMUSCULAR; INTRAVENOUS
Status: COMPLETED | OUTPATIENT
Start: 2022-09-09 | End: 2022-09-09

## 2022-09-09 RX ORDER — INSULIN GLARGINE 100 [IU]/ML
20 INJECTION, SOLUTION SUBCUTANEOUS
Status: DISCONTINUED | OUTPATIENT
Start: 2022-09-09 | End: 2022-09-11

## 2022-09-09 RX ORDER — LIDOCAINE HYDROCHLORIDE 10 MG/ML
INJECTION, SOLUTION EPIDURAL; INFILTRATION; INTRACAUDAL; PERINEURAL
Status: DISCONTINUED
Start: 2022-09-09 | End: 2022-09-11 | Stop reason: ALTCHOICE

## 2022-09-09 RX ORDER — HEPARIN SODIUM 1000 [USP'U]/ML
10000 INJECTION, SOLUTION INTRAVENOUS; SUBCUTANEOUS ONCE
Status: COMPLETED | OUTPATIENT
Start: 2022-09-09 | End: 2022-09-09

## 2022-09-09 RX ORDER — FENTANYL CITRATE 50 UG/ML
100 INJECTION, SOLUTION INTRAMUSCULAR; INTRAVENOUS
Status: DISCONTINUED | OUTPATIENT
Start: 2022-09-09 | End: 2022-09-09

## 2022-09-09 RX ORDER — INSULIN LISPRO 100 [IU]/ML
INJECTION, SOLUTION INTRAVENOUS; SUBCUTANEOUS
Status: DISCONTINUED | OUTPATIENT
Start: 2022-09-09 | End: 2022-09-12 | Stop reason: HOSPADM

## 2022-09-09 RX ORDER — LIDOCAINE HYDROCHLORIDE 10 MG/ML
5 INJECTION, SOLUTION EPIDURAL; INFILTRATION; INTRACAUDAL; PERINEURAL
Status: COMPLETED | OUTPATIENT
Start: 2022-09-09 | End: 2022-09-09

## 2022-09-09 RX ORDER — SODIUM CHLORIDE 9 MG/ML
25 INJECTION, SOLUTION INTRAVENOUS CONTINUOUS
Status: DISCONTINUED | OUTPATIENT
Start: 2022-09-09 | End: 2022-09-09

## 2022-09-09 RX ORDER — INSULIN LISPRO 100 [IU]/ML
INJECTION, SOLUTION INTRAVENOUS; SUBCUTANEOUS EVERY 6 HOURS
Status: DISCONTINUED | OUTPATIENT
Start: 2022-09-09 | End: 2022-09-09

## 2022-09-09 RX ORDER — LIDOCAINE HYDROCHLORIDE 20 MG/ML
10 INJECTION, SOLUTION INFILTRATION; PERINEURAL ONCE
Status: DISCONTINUED | OUTPATIENT
Start: 2022-09-09 | End: 2022-09-09

## 2022-09-09 RX ORDER — MIDAZOLAM HYDROCHLORIDE 1 MG/ML
1-5 INJECTION, SOLUTION INTRAMUSCULAR; INTRAVENOUS
Status: DISCONTINUED | OUTPATIENT
Start: 2022-09-09 | End: 2022-09-09

## 2022-09-09 RX ORDER — HEPARIN SODIUM 200 [USP'U]/100ML
400 INJECTION, SOLUTION INTRAVENOUS ONCE
Status: COMPLETED | OUTPATIENT
Start: 2022-09-09 | End: 2022-09-09

## 2022-09-09 RX ADMIN — PREGABALIN 75 MG: 50 CAPSULE ORAL at 08:45

## 2022-09-09 RX ADMIN — BUMETANIDE 2 MG: 0.25 INJECTION INTRAMUSCULAR; INTRAVENOUS at 16:27

## 2022-09-09 RX ADMIN — ONDANSETRON 4 MG: 2 INJECTION INTRAMUSCULAR; INTRAVENOUS at 01:23

## 2022-09-09 RX ADMIN — PANTOPRAZOLE SODIUM 40 MG: 40 TABLET, DELAYED RELEASE ORAL at 06:38

## 2022-09-09 RX ADMIN — SALINE NASAL SPRAY 2 SPRAY: 1.5 SOLUTION NASAL at 00:47

## 2022-09-09 RX ADMIN — CEFAZOLIN 2 G: 1 INJECTION, POWDER, FOR SOLUTION INTRAMUSCULAR; INTRAVENOUS at 10:45

## 2022-09-09 RX ADMIN — HYDRALAZINE HYDROCHLORIDE 100 MG: 50 TABLET, FILM COATED ORAL at 16:27

## 2022-09-09 RX ADMIN — ROSUVASTATIN CALCIUM 10 MG: 10 TABLET, FILM COATED ORAL at 21:37

## 2022-09-09 RX ADMIN — OXYCODONE AND ACETAMINOPHEN 1 TABLET: 5; 325 TABLET ORAL at 16:27

## 2022-09-09 RX ADMIN — BUMETANIDE 2 MG: 0.25 INJECTION INTRAMUSCULAR; INTRAVENOUS at 06:15

## 2022-09-09 RX ADMIN — HYDRALAZINE HYDROCHLORIDE 100 MG: 50 TABLET, FILM COATED ORAL at 21:37

## 2022-09-09 RX ADMIN — AMLODIPINE BESYLATE 10 MG: 5 TABLET ORAL at 08:45

## 2022-09-09 RX ADMIN — ACETAMINOPHEN 650 MG: 325 TABLET ORAL at 21:37

## 2022-09-09 RX ADMIN — ONDANSETRON 4 MG: 2 INJECTION INTRAMUSCULAR; INTRAVENOUS at 11:10

## 2022-09-09 RX ADMIN — TAMSULOSIN HYDROCHLORIDE 0.4 MG: 0.4 CAPSULE ORAL at 08:45

## 2022-09-09 RX ADMIN — LIDOCAINE HYDROCHLORIDE 5 ML: 10 INJECTION, SOLUTION EPIDURAL; INFILTRATION; INTRACAUDAL; PERINEURAL at 11:00

## 2022-09-09 RX ADMIN — OXYCODONE AND ACETAMINOPHEN 1 TABLET: 5; 325 TABLET ORAL at 06:36

## 2022-09-09 RX ADMIN — HEPARIN SODIUM 10000 UNITS: 1000 INJECTION, SOLUTION INTRAVENOUS; SUBCUTANEOUS at 11:00

## 2022-09-09 RX ADMIN — MIDAZOLAM 1 MG: 1 INJECTION INTRAMUSCULAR; INTRAVENOUS at 11:34

## 2022-09-09 RX ADMIN — INSULIN GLARGINE 20 UNITS: 100 INJECTION, SOLUTION SUBCUTANEOUS at 21:38

## 2022-09-09 RX ADMIN — CARVEDILOL 12.5 MG: 12.5 TABLET, FILM COATED ORAL at 16:27

## 2022-09-09 RX ADMIN — CARVEDILOL 12.5 MG: 12.5 TABLET, FILM COATED ORAL at 08:45

## 2022-09-09 RX ADMIN — PREGABALIN 75 MG: 50 CAPSULE ORAL at 17:21

## 2022-09-09 RX ADMIN — Medication 7 UNITS: at 07:09

## 2022-09-09 RX ADMIN — FENTANYL CITRATE 25 MCG: 50 INJECTION, SOLUTION INTRAMUSCULAR; INTRAVENOUS at 11:30

## 2022-09-09 RX ADMIN — MIDAZOLAM 1 MG: 1 INJECTION INTRAMUSCULAR; INTRAVENOUS at 11:30

## 2022-09-09 RX ADMIN — HEPARIN SODIUM 800 UNITS: 200 INJECTION, SOLUTION INTRAVENOUS at 11:00

## 2022-09-09 RX ADMIN — HYDRALAZINE HYDROCHLORIDE 100 MG: 50 TABLET, FILM COATED ORAL at 08:45

## 2022-09-09 RX ADMIN — LIDOCAINE HYDROCHLORIDE 200 MG: 20 INJECTION, SOLUTION INFILTRATION; PERINEURAL at 11:00

## 2022-09-09 RX ADMIN — HEPARIN SODIUM 2600 UNITS: 1000 INJECTION INTRAVENOUS; SUBCUTANEOUS at 15:57

## 2022-09-09 RX ADMIN — SODIUM CHLORIDE 25 ML/HR: 9 INJECTION, SOLUTION INTRAVENOUS at 10:45

## 2022-09-09 RX ADMIN — ONDANSETRON 4 MG: 2 INJECTION INTRAMUSCULAR; INTRAVENOUS at 06:47

## 2022-09-09 RX ADMIN — Medication 2 UNITS: at 21:38

## 2022-09-09 RX ADMIN — BUMETANIDE 2 MG: 0.25 INJECTION INTRAMUSCULAR; INTRAVENOUS at 21:38

## 2022-09-09 NOTE — PROGRESS NOTES
Occupational Therapy  Chart reviewed for evaluation, attempted to see however patient preparing to go off the floor for thoracentesis. Will continue to follow.    Donnella Aase, OTR/L

## 2022-09-09 NOTE — PROGRESS NOTES
Received notification from bedside RN about patient with regards to: , NPO past MN for Francis cath placement in AM. Needs order for Lispro coverage    Intervention given: Lispro 2 units SQ x 1 dose ordered    0009: Notified of complain nasal dryness  VS: /75, HR 95, RR 16, o2 sat 92% ON NC 2 L    - Saline nasal spray PRN ordered

## 2022-09-09 NOTE — ROUTINE PROCESS
TRANSFER - OUT REPORT:    Verbal report given to ASAD Wang on Ruby Avendano  being transferred to dialysis for routine progression of care       Report consisted of patients Situation, Background, Assessment and   Recommendations(SBAR). Information from the following report(s) Procedure Summary was reviewed with the receiving nurse. Opportunity for questions and clarification was provided.       Patient transported with:   O2 @ 3 liters  Registered Nurse

## 2022-09-09 NOTE — PROGRESS NOTES
Nephrology Progress Note  [facility] / [address] [place], [address]  Phone - [phone]  Fax - [phone]                 Patient: Gutierrez Mayo                   YOB: 1980        Date- 9/9/2022                      Admit Date: 9/6/2022  CC: Follow up for CKD stage IV with progression to ESRD          IMPRESSION & PLAN:   CKD stage IV with progression to ESRD  Volume overload  Acute hypoxic respiratory failure  Urinary retention status post Beard  Type 1 diabetes  Hypertension  Anemia  Pleural effusion    PLAN-  Plan for HD #3 today  Next HD is going to be on Monday  S/p PC placement by IR  Continue Epogen for anemia  Also consulted  for placement at [address] [place] [address]. Spoke to RN  He can be discharged once he is outpatient chair arranged. Subjective: Interval History:   -Seen and examined this morning.  -Status post PermCath placement  -Seen on HD today    Objective:   Vitals:    09/09/22 1145 09/09/22 1205 09/09/22 1215 09/09/22 1230   BP: (!) 150/79 134/76 (!) 156/73    Pulse: 85 81 83    Resp: 16 16 16 16   Temp:  98.1 °F (36.7 °C)     TempSrc:  Axillary     SpO2: 98%      Weight:       Height:          09/08 0701 - 09/09 0700  In: -   Out: 1831 [KPYFD:3800]  Last 3 Recorded Weights in this Encounter    09/06/22 0842   Weight: 81.6 kg (180 lb)      Physical exam:    GEN: NAD  NECK- Supple, no mass  RESP: No wheezing, Clear b/l  CVS: S1,S2  RRR  NEURO: Normal speech, Non focal  EXT: 1+ edema  HD access: Right IJ [place]      Chart reviewed. Pertinent Notes reviewed.      Data Review :  Recent Labs     09/09/22  0316 09/08/22  0244 09/07/22  0337   *  133* 134* 136   K 4.3  4.3 4.3 4.2   CL 97  97 102 101   CO2 25  28 26 28   BUN 37*  39* 48* 75*   CREA 3.41*  3.42* 4.29* 5.50*   *  352* 68 69   CA 9.0  8.8 8.3* 8.3*   PHOS 3.7 5.0* 7.7* Recent Labs     09/09/22  0316 09/08/22  0244 09/07/22  0337   WBC 8.0 8.3 7.9   HGB 8.9* 8.2* 8.2*   HCT 27.3* 25.9* 25.2*    248 235       No results for input(s): FE, TIBC, PSAT, FERR in the last 72 hours.    Medication list  reviewed  Current Facility-Administered Medications   Medication Dose Route Frequency    sodium chloride (OCEAN) 0.65 % nasal squeeze bottle 2 Spray  2 Spray Both Nostrils Q2H PRN    insulin lispro (HUMALOG) injection   SubCUTAneous Q6H    insulin glargine (LANTUS) injection 20 Units  20 Units SubCUTAneous QHS    lidocaine (PF) (XYLOCAINE) 10 mg/mL (1 %) injection        heparin (porcine) 1,000 unit/mL injection 2,600 Units  2,600 Units Hemodialysis DIALYSIS PRN    amLODIPine (NORVASC) tablet 10 mg  10 mg Oral DAILY    carvediloL (COREG) tablet 12.5 mg  12.5 mg Oral BID WITH MEALS    rosuvastatin (CRESTOR) tablet 10 mg  10 mg Oral QHS    tamsulosin (FLOMAX) capsule 0.4 mg  0.4 mg Oral DAILY    hydrALAZINE (APRESOLINE) tablet 100 mg  100 mg Oral TID    pregabalin (LYRICA) capsule 75 mg  75 mg Oral BID    acetaminophen (TYLENOL) tablet 650 mg  650 mg Oral Q6H PRN    ondansetron (ZOFRAN) injection 4 mg  4 mg IntraVENous Q4H PRN    [START ON 9/12/2022] cloNIDine (CATAPRES) 0.1 mg/24 hr patch 1 Patch  1 Patch TransDERmal Q7D    pantoprazole (PROTONIX) tablet 40 mg  40 mg Oral ACB    [Held by provider] heparin (porcine) injection 5,000 Units  5,000 Units SubCUTAneous Q12H    glucose chewable tablet 16 g  4 Tablet Oral PRN    glucagon (GLUCAGEN) injection 1 mg  1 mg IntraMUSCular PRN    dextrose 10% infusion 0-250 mL  0-250 mL IntraVENous PRN    oxyCODONE-acetaminophen (PERCOCET) 5-325 mg per tablet 1 Tablet  1 Tablet Oral Q6H PRN    epoetin rell-epbx (RETACRIT) 12,000 Units combo injection  12,000 Units SubCUTAneous Q TUE, THU & SAT    bumetanide (BUMEX) injection 2 mg  2 mg IntraVENous Q8H          Hari Zamora MD  9/9/2022

## 2022-09-09 NOTE — PROGRESS NOTES
Hospitalist Progress Note    NAME: Fernando Clark   :  1980   MRN:  [id number]       Assessment / Plan:  acute Hypoxic Respiratory Failure  2/2 to bilateral pleural effusions  [facility] on ckd 4  Started on hemodialysis on   CT chest with moderate bilateral effusions L>R  On 3L O2, keep supplemental O2 for sats >90%  Continue with IV Lasix, he is oliguric  Troponin wnl, EKG without acute st segment changes  Pro BNP 5,688  ECHO with EF 47-69%, normal diastolic function ()  No indication to repeat ECHO at this time  No improvement despite IV Lasix, nephrology decided to start hemodialysis via [facility] his first HD treatment ton , got his second treatment today  Is to place a permacath tomorrow and plan for outpatient hemodialysis set up  We will repeat chest x-ray, if persistent pleural effusion will consider thoracocentesis  N.p.o. after midnight  : Patient underwent left-sided thoracocentesis which removed about 800 cc of fluid, will follow-up pleural fluid studies  Had a permacath placement    Urinary retention  Beard in place from last admission  Cont. tamsulosin    DM Lantus 15 units SSI  HTN- cont. meds  Normocytic Anemia At baseline Hgb monitor  Correct Electrolytes as needed  PT/OT   CM/SW    Code Status: full  Surrogate Decision Maker: Cheo Elaine  [phone]    DVT Prophylaxis: heparin  GI Prophylaxis: not indicated    Baseline: home IADL's    25.0 - 29.9 Overweight / Body mass index is 26.58 kg/m². Estimated discharge date:   Barriers:    Code status: Full  Prophylaxis: Hep SQ  Recommended Disposition: Home w/Family     Subjective:     Chief Complaint / Reason for Physician Visit  Follow-up acute respiratory failure with hypoxia, worsening CKD. Seen after  permacath placement and thoracocentesis  No acute issues   discussed with RN events overnight.      Review of Systems:  Symptom Y/N Comments  Symptom Y/N Comments   Fever/Chills n   Chest Pain n    Poor Appetite    Edema     Cough n   Abdominal Pain n    Sputum    Joint Pain     SOB/JOHNSTON y   Pruritis/Rash     Nausea/vomit n   Tolerating PT/OT     Diarrhea n   Tolerating Diet n    Constipation    Other       Could NOT obtain due to:      Objective:     VITALS:   Last 24hrs VS reviewed since prior progress note. Most recent are:  Patient Vitals for the past 24 hrs:   Temp Pulse Resp BP SpO2   09/09/22 1632 98.6 °F (37 °C) 94 16 (!) 145/86 98 %   09/09/22 1538 98.3 °F (36.8 °C) 80 16 115/63 --   09/09/22 1530 -- 80 16 (!) 103/59 --   09/09/22 1515 -- 79 16 101/62 --   09/09/22 1500 -- 79 16 100/63 --   09/09/22 1445 -- 81 16 (!) 106/58 --   09/09/22 1430 -- 78 16 (!) 101/58 --   09/09/22 1415 -- 83 16 (!) 107/54 --   09/09/22 1400 -- 75 16 (!) 105/56 --   09/09/22 1345 -- 77 16 101/61 --   09/09/22 1330 -- 80 16 (!) 109/54 --   09/09/22 1315 -- 78 16 115/62 --   09/09/22 1300 -- 81 16 117/65 --   09/09/22 1245 -- 78 16 117/67 --   09/09/22 1230 -- 80 16 129/72 --   09/09/22 1215 -- 83 16 (!) 156/73 --   09/09/22 1205 98.1 °F (36.7 °C) 81 16 134/76 --   09/09/22 1145 -- 85 16 (!) 150/79 98 %   09/09/22 1140 -- 85 14 (!) 157/74 98 %   09/09/22 1135 -- 85 18 (!) 157/78 97 %   09/09/22 1130 -- 85 18 (!) 152/77 100 %   09/09/22 1029 -- 87 18 (!) 145/70 100 %   09/09/22 1022 -- 87 18 (!) 140/79 100 %   09/09/22 1006 98 °F (36.7 °C) 90 18 (!) 142/71 100 %   09/09/22 0743 98.3 °F (36.8 °C) 95 16 (!) 149/74 92 %   09/09/22 0256 98.8 °F (37.1 °C) 93 20 (!) 154/74 90 %   09/08/22 2056 98.7 °F (37.1 °C) 95 20 (!) 157/75 (!) 89 %         Intake/Output Summary (Last 24 hours) at 9/9/2022 1725  Last data filed at 9/9/2022 1538  Gross per 24 hour   Intake --   Output 5550 ml   Net -5550 ml          I had a face to face encounter and independently examined this patient on 9/9/2022, as outlined below:  PHYSICAL EXAM:  General: WD, WN. Alert, cooperative, no acute distress    EENT:  EOMI. Anicteric sclerae.  MMM  Resp:  CTA bilaterally, no wheezing or rales. No accessory muscle use  CV:  Regular  rhythm,  No edema  GI:  Soft, Non distended, Non tender. +Bowel sounds  Neurologic:  Alert and oriented X 3, normal speech,   Psych:   Good insight. Not anxious nor agitated  Skin:  No rashes. No jaundice    Reviewed most current lab test results and cultures  YES  Reviewed most current radiology test results   YES  Review and summation of old records today    NO  Reviewed patient's current orders and MAR    YES  PMH/SH reviewed - no change compared to H&P  ________________________________________________________________________  Care Plan discussed with:    Comments   Patient x    Family      RN x    Care Manager     Consultant                        Multidiciplinary team rounds were held today with , nursing, pharmacist and clinical coordinator. Patient's plan of care was discussed; medications were reviewed and discharge planning was addressed. ________________________________________________________________________  Total NON critical care TIME:  35   Minutes    Total CRITICAL CARE TIME Spent:   Minutes non procedure based      Comments   >50% of visit spent in counseling and coordination of care     ________________________________________________________________________  Bryant Barbour MD     Procedures: see electronic medical records for all procedures/Xrays and details which were not copied into this note but were reviewed prior to creation of Plan. LABS:  I reviewed today's most current labs and imaging studies.   Pertinent labs include:  Recent Labs     09/09/22  0316 09/08/22  0244 09/07/22  0337   WBC 8.0 8.3 7.9   HGB 8.9* 8.2* 8.2*   HCT 27.3* 25.9* 25.2*    248 235       Recent Labs     09/09/22  0316 09/08/22  0244 09/07/22  0337   *  133* 134* 136   K 4.3  4.3 4.3 4.2   CL 97  97 102 101   CO2 25  28 26 28   *  352* 68 69   BUN 37*  39* 48* 75*   CREA 3.41*  3.42* 4.29* 5.50*   CA 9.0 8.8 8.3* 8.3*   PHOS 3.7 5.0* 7.7*   ALB 2.3* 2.4* 2.5*         Signed: Geri Roberts MD

## 2022-09-09 NOTE — PROGRESS NOTES
Physical Therapy  Chart reviewed for evaluation, attempted to see however patient preparing to go off the floor for thoracentesis. Will continue to follow.

## 2022-09-09 NOTE — PROGRESS NOTES
Bedside and Verbal shift change report given to Sobeida Hatch RN (oncoming nurse) by Hillary Gilliland RN (offgoing nurse). Report included the following information SBAR, Kardex, Intake/Output, MAR, and Recent Results.

## 2022-09-09 NOTE — PROGRESS NOTES
Bedside and Verbal shift change report given to [address] (oncoming nurse) by Erna Perkins RN (offgoing nurse). Report included the following information SBAR, Kardex, Procedure Summary, Intake/Output, MAR, Recent Results, and Cardiac Rhythm NSR .

## 2022-09-09 NOTE — PROCEDURES
Hemodialysis / [phone]    Vitals Pre Post Assessment Pre Post   BP BP: 134/76 (09/09/22 1205) 115/63 LOC A&Ox self; post permcath sedation A&Ox4   HR Pulse (Heart Rate): 81 (09/09/22 1205) 80 Lungs clear No change   Resp Resp Rate: 16 (09/09/22 1205) 16 Cardiac RRR No change   Temp Temp: 98.1 °F (36.7 °C) (09/09/22 1205) 98.3 Skin Tattoos / CDI No change   Weight    Edema Trace No change   Tele status   Pain Pain Intensity 1: 0 (09/09/22 1145) No change     Orders   Duration: Start: 1578 End: 1538 Total: 3.5hrs   Dialyzer: Dialyzer/Set Up Inspection: Revaclear (09/09/22 1205)   K Bath: Dialysate K (mEq/L): 3 (09/09/22 1205)   Ca Bath: Dialysate CA (mEq/L): 2.5 (09/09/22 1205)   Na: Dialysate NA (mEq/L): 138 (09/09/22 1205)   Bicarb: Dialysate HCO3 (mEq/L): 35 (09/09/22 1205)   Target Fluid Removal: Goal/Amount of Fluid to Remove (mL): 3000 mL (09/09/22 1205)     Access   Type & Location: R PC : Dressing CDI. No s/s of infection. Both lumens aspirate & flush well. Running well at . SBAR received from Primary RN. Pt arrived to HD suite A&Ox self. Consent signed & on file. Each catheter limb disinfected per p&p, caps removed, hubs disinfected per p&p. Each lumen aspirated for blood return and flushed with Normal Saline per policy. VSS. Dialysis Tx initiated. Comments:   Dressing CDI. No s/s of infection noted.                                      Labs   HBsAg (Antigen) / date: Neg  9/7/22                                             HBsAb (Antibody) / date: Susc 9/7/22   Source: EPIC   Obtained/Reviewed  Critical Results Called HGB   Date Value Ref Range Status   09/09/2022 8.9 (L) 12.1 - 17.0 g/dL Final     Potassium   Date Value Ref Range Status   09/09/2022 4.3 3.5 - 5.1 mmol/L Final   09/09/2022 4.3 3.5 - 5.1 mmol/L Final     Calcium   Date Value Ref Range Status   09/09/2022 9.0 8.5 - 10.1 MG/DL Final   09/09/2022 8.8 8.5 - 10.1 MG/DL Final     BUN   Date Value Ref Range Status   09/09/2022 37 (H) 6 - 20 MG/DL Final   09/09/2022 39 (H) 6 - 20 MG/DL Final     Creatinine   Date Value Ref Range Status   09/09/2022 3.41 (H) 0.70 - 1.30 MG/DL Final   09/09/2022 3.42 (H) 0.70 - 1.30 MG/DL Final     Comment:     INVESTIGATED PER DELTA CHECK PROTOCOL        Meds Given   Name Dose Route   Heparin 1:1000 1.8 [email or web address]   Heparin 1:1000 1.8 [email or web address]          Adequacy / Fluid    Total Liters Process: 76.9   Net Fluid Removed: 3000mL      Comments   Time Out Done:   (Time) 1200   Admitting Diagnosis: ESRD   Consent obtained/signed: Informed Consent Verified: Yes (09/09/22 1205)   Machine / Raynald November # Machine Number: [email or web address] Greenbrandon (09/09/22 1205)   Primary Nurse Rpt Pre: Sher Parsons RN   Primary Nurse Rpt Post: Sher Parsons RN   Pt Education: procedural   Care Plan: On going   Pts outpatient clinic: TBD     Tx Summary  1205:  R PC : Dressing CDI. No s/s of infection. Both lumens aspirate & flush well. Running well at . SBAR received from Primary RN. Pt arrived to HD suite A&Ox self. Consent signed & on file. Each catheter limb disinfected per p&p, caps removed, hubs disinfected per p&p. Each lumen aspirated for blood return and flushed with Normal Saline per policy. VSS. Dialysis Tx initiated. 1215:  Pt resting  1230:  Pt resting  1245:  Pt resting  1300:  Pt resting  1315:  Pt resting  1330:  Pt resting  1345:  Pt resting  1400:  Pt resting  1415:  Pt resting  1430:  Pt resting  1445:  Pt resting  1500:  Pt resting  1515:  Pt resting  1538: Tx ended. VSS. Each dialysis catheter limb disinfected per p&p, all possible blood returned per p&p, and each dialysis hub disinfected per p&p. Each lumen flushed, post dialysis catheter Heparin dwell instilled per order, and caps applied. Bed locked and in the lowest position, call bell and belongings in reach. SBAR given to Primary, RN. Patient is stable at time of their/ my departure. All Dialysis related medications have been reviewed. Comments:   Assessment performed by RN. Procedure and documentation observed and reviewed by Fede Griffin RN.

## 2022-09-10 ENCOUNTER — APPOINTMENT (OUTPATIENT)
Dept: GENERAL RADIOLOGY | Age: 42
DRG: 470 | End: 2022-09-10
Attending: INTERNAL MEDICINE
Payer: COMMERCIAL

## 2022-09-10 LAB
ALBUMIN SERPL-MCNC: 2.4 G/DL (ref 3.5–5)
ALBUMIN SERPL-MCNC: 2.5 G/DL (ref 3.5–5)
ALBUMIN/GLOB SERPL: 0.7 {RATIO} (ref 1.1–2.2)
ALP SERPL-CCNC: 160 U/L (ref 45–117)
ALT SERPL-CCNC: 25 U/L (ref 12–78)
ANION GAP SERPL CALC-SCNC: 14 MMOL/L (ref 5–15)
AST SERPL-CCNC: 16 U/L (ref 15–37)
BILIRUB DIRECT SERPL-MCNC: 0.1 MG/DL (ref 0–0.2)
BILIRUB SERPL-MCNC: 0.3 MG/DL (ref 0.2–1)
BUN SERPL-MCNC: 26 MG/DL (ref 6–20)
BUN/CREAT SERPL: 9 (ref 12–20)
CALCIUM SERPL-MCNC: 9.4 MG/DL (ref 8.5–10.1)
CHLORIDE SERPL-SCNC: 95 MMOL/L (ref 97–108)
CO2 SERPL-SCNC: 24 MMOL/L (ref 21–32)
CREAT SERPL-MCNC: 2.97 MG/DL (ref 0.7–1.3)
ERYTHROCYTE [DISTWIDTH] IN BLOOD BY AUTOMATED COUNT: 14.1 % (ref 11.5–14.5)
GLOBULIN SER CALC-MCNC: 3.4 G/DL (ref 2–4)
GLUCOSE BLD STRIP.AUTO-MCNC: 279 MG/DL (ref 65–117)
GLUCOSE BLD STRIP.AUTO-MCNC: 312 MG/DL (ref 65–117)
GLUCOSE BLD STRIP.AUTO-MCNC: 331 MG/DL (ref 65–117)
GLUCOSE BLD STRIP.AUTO-MCNC: 349 MG/DL (ref 65–117)
GLUCOSE SERPL-MCNC: 353 MG/DL (ref 65–100)
HCT VFR BLD AUTO: 28.1 % (ref 36.6–50.3)
HGB BLD-MCNC: 9 G/DL (ref 12.1–17)
MCH RBC QN AUTO: 28.2 PG (ref 26–34)
MCHC RBC AUTO-ENTMCNC: 32 G/DL (ref 30–36.5)
MCV RBC AUTO: 88.1 FL (ref 80–99)
NRBC # BLD: 0 K/UL (ref 0–0.01)
NRBC BLD-RTO: 0 PER 100 WBC
PHOSPHATE SERPL-MCNC: 3.5 MG/DL (ref 2.6–4.7)
PLATELET # BLD AUTO: 261 K/UL (ref 150–400)
PMV BLD AUTO: 11.8 FL (ref 8.9–12.9)
POTASSIUM SERPL-SCNC: 4 MMOL/L (ref 3.5–5.1)
PROT SERPL-MCNC: 5.9 G/DL (ref 6.4–8.2)
RBC # BLD AUTO: 3.19 M/UL (ref 4.1–5.7)
SERVICE CMNT-IMP: ABNORMAL
SODIUM SERPL-SCNC: 133 MMOL/L (ref 136–145)
WBC # BLD AUTO: 9.9 K/UL (ref 4.1–11.1)

## 2022-09-10 PROCEDURE — 97535 SELF CARE MNGMENT TRAINING: CPT

## 2022-09-10 PROCEDURE — 74011000250 HC RX REV CODE- 250: Performed by: INTERNAL MEDICINE

## 2022-09-10 PROCEDURE — 65270000029 HC RM PRIVATE

## 2022-09-10 PROCEDURE — 85027 COMPLETE CBC AUTOMATED: CPT

## 2022-09-10 PROCEDURE — 74018 RADEX ABDOMEN 1 VIEW: CPT

## 2022-09-10 PROCEDURE — 74011250636 HC RX REV CODE- 250/636: Performed by: EMERGENCY MEDICINE

## 2022-09-10 PROCEDURE — 36415 COLL VENOUS BLD VENIPUNCTURE: CPT

## 2022-09-10 PROCEDURE — 97116 GAIT TRAINING THERAPY: CPT

## 2022-09-10 PROCEDURE — 80076 HEPATIC FUNCTION PANEL: CPT

## 2022-09-10 PROCEDURE — 74011636637 HC RX REV CODE- 636/637: Performed by: INTERNAL MEDICINE

## 2022-09-10 PROCEDURE — 97165 OT EVAL LOW COMPLEX 30 MIN: CPT

## 2022-09-10 PROCEDURE — 94760 N-INVAS EAR/PLS OXIMETRY 1: CPT

## 2022-09-10 PROCEDURE — 74011250637 HC RX REV CODE- 250/637: Performed by: INTERNAL MEDICINE

## 2022-09-10 PROCEDURE — 97161 PT EVAL LOW COMPLEX 20 MIN: CPT

## 2022-09-10 PROCEDURE — 82962 GLUCOSE BLOOD TEST: CPT

## 2022-09-10 PROCEDURE — 80069 RENAL FUNCTION PANEL: CPT

## 2022-09-10 PROCEDURE — 74011250636 HC RX REV CODE- 250/636: Performed by: INTERNAL MEDICINE

## 2022-09-10 RX ADMIN — PANTOPRAZOLE SODIUM 40 MG: 40 TABLET, DELAYED RELEASE ORAL at 06:37

## 2022-09-10 RX ADMIN — EPOETIN ALFA-EPBX 12000 UNITS: 10000 INJECTION, SOLUTION INTRAVENOUS; SUBCUTANEOUS at 22:34

## 2022-09-10 RX ADMIN — Medication 2 UNITS: at 22:29

## 2022-09-10 RX ADMIN — ONDANSETRON 4 MG: 2 INJECTION INTRAMUSCULAR; INTRAVENOUS at 08:22

## 2022-09-10 RX ADMIN — HYDRALAZINE HYDROCHLORIDE 100 MG: 50 TABLET, FILM COATED ORAL at 22:29

## 2022-09-10 RX ADMIN — Medication 4 UNITS: at 11:28

## 2022-09-10 RX ADMIN — ONDANSETRON 4 MG: 2 INJECTION INTRAMUSCULAR; INTRAVENOUS at 01:40

## 2022-09-10 RX ADMIN — OXYCODONE AND ACETAMINOPHEN 1 TABLET: 5; 325 TABLET ORAL at 22:29

## 2022-09-10 RX ADMIN — HYDRALAZINE HYDROCHLORIDE 100 MG: 50 TABLET, FILM COATED ORAL at 17:40

## 2022-09-10 RX ADMIN — Medication 4 UNITS: at 17:40

## 2022-09-10 RX ADMIN — PREGABALIN 75 MG: 50 CAPSULE ORAL at 08:22

## 2022-09-10 RX ADMIN — PREGABALIN 75 MG: 50 CAPSULE ORAL at 17:39

## 2022-09-10 RX ADMIN — OXYCODONE AND ACETAMINOPHEN 1 TABLET: 5; 325 TABLET ORAL at 01:06

## 2022-09-10 RX ADMIN — AMLODIPINE BESYLATE 10 MG: 5 TABLET ORAL at 08:22

## 2022-09-10 RX ADMIN — INSULIN GLARGINE 20 UNITS: 100 INJECTION, SOLUTION SUBCUTANEOUS at 22:29

## 2022-09-10 RX ADMIN — BUMETANIDE 2 MG: 0.25 INJECTION INTRAMUSCULAR; INTRAVENOUS at 05:28

## 2022-09-10 RX ADMIN — OXYCODONE AND ACETAMINOPHEN 1 TABLET: 5; 325 TABLET ORAL at 15:34

## 2022-09-10 RX ADMIN — TAMSULOSIN HYDROCHLORIDE 0.4 MG: 0.4 CAPSULE ORAL at 08:22

## 2022-09-10 RX ADMIN — BUMETANIDE 2 MG: 0.25 INJECTION INTRAMUSCULAR; INTRAVENOUS at 15:34

## 2022-09-10 RX ADMIN — Medication 4 UNITS: at 08:22

## 2022-09-10 RX ADMIN — BUMETANIDE 2 MG: 0.25 INJECTION INTRAMUSCULAR; INTRAVENOUS at 22:29

## 2022-09-10 RX ADMIN — CARVEDILOL 12.5 MG: 12.5 TABLET, FILM COATED ORAL at 17:40

## 2022-09-10 RX ADMIN — HYDRALAZINE HYDROCHLORIDE 100 MG: 50 TABLET, FILM COATED ORAL at 08:22

## 2022-09-10 RX ADMIN — ROSUVASTATIN CALCIUM 10 MG: 10 TABLET, FILM COATED ORAL at 22:30

## 2022-09-10 RX ADMIN — CARVEDILOL 12.5 MG: 12.5 TABLET, FILM COATED ORAL at 08:22

## 2022-09-10 NOTE — PROGRESS NOTES
Hospitalist Progress Note    NAME: Daly Wang   :  1980   MRN:  [id number]       Assessment / Plan:  acute Hypoxic Respiratory Failure  2/2 to bilateral pleural effusions  [facility] on ckd 4  Started on hemodialysis on   CT chest with moderate bilateral effusions L>R  On 3L O2, keep supplemental O2 for sats >90%  Continue with IV Lasix, he is oliguric  Troponin wnl, EKG without acute st segment changes  Pro BNP 5,688  ECHO with EF 43-35%, normal diastolic function ()  No indication to repeat ECHO at this time  No improvement despite IV Lasix, nephrology decided to start hemodialysis via [facility] his first HD treatment ton , got his second treatment today  Is to place a permacath tomorrow and plan for outpatient hemodialysis set up  We will repeat chest x-ray, if persistent pleural effusion will consider thoracocentesis  N.p.o. after midnight  : Patient underwent left-sided thoracocentesis which removed about 800 cc of fluid, will follow-up pleural fluid studies  Had a permacath placement  10/09: Patient is complaining of nausea and vomiting , complain of reflux  Continue PPI    Urinary retention  Beard in place from last admission, failed voiding trial  Cont. tamsulosin    DM Egolyw09 units SSI  HTN- cont. meds  Normocytic Anemia At baseline Hgb monitor  Correct Electrolytes as needed  PT/OT   CM/SW    Code Status: full  Surrogate Decision Maker: Cheo Elaine  [phone]    DVT Prophylaxis: heparin  GI Prophylaxis: not indicated    Baseline: home IADL's    25.0 - 29.9 Overweight / Body mass index is 26.58 kg/m². Estimated discharge date:   Barriers:    Code status: Full  Prophylaxis: Hep SQ  Recommended Disposition: Home w/Family     Subjective:     Chief Complaint / Reason for Physician Visit  Follow-up acute respiratory failure with hypoxia, worsening CKD. Complain of nausea and vomiting   discussed with RN events overnight.      Review of Systems:  Symptom Y/N Comments  Symptom Y/N Comments   Fever/Chills n   Chest Pain n    Poor Appetite    Edema     Cough n   Abdominal Pain n    Sputum    Joint Pain     SOB/JOHNSTON y   Pruritis/Rash     Nausea/vomit Y/y    Tolerating PT/OT     Diarrhea n   Tolerating Diet n    Constipation    Other       Could NOT obtain due to:      Objective:     VITALS:   Last 24hrs VS reviewed since prior progress note.  Most recent are:  Patient Vitals for the past 24 hrs:   Temp Pulse Resp BP SpO2   09/10/22 0827 98.8 °F (37.1 °C) 86 16 (!) 157/81 92 %   09/10/22 0802 98.7 °F (37.1 °C) -- 18 (!) 162/87 94 %   09/10/22 0241 98.4 °F (36.9 °C) 89 18 (!) 141/62 94 %   09/09/22 2055 99.8 °F (37.7 °C) 81 17 (!) 142/55 92 %   09/09/22 1632 98.6 °F (37 °C) 94 16 (!) 145/86 98 %   09/09/22 1538 98.3 °F (36.8 °C) 80 16 115/63 --   09/09/22 1530 -- 80 16 (!) 103/59 --   09/09/22 1515 -- 79 16 101/62 --   09/09/22 1500 -- 79 16 100/63 --   09/09/22 1445 -- 81 16 (!) 106/58 --   09/09/22 1430 -- 78 16 (!) 101/58 --   09/09/22 1415 -- 83 16 (!) 107/54 --   09/09/22 1400 -- 75 16 (!) 105/56 --   09/09/22 1345 -- 77 16 101/61 --   09/09/22 1330 -- 80 16 (!) 109/54 --   09/09/22 1315 -- 78 16 115/62 --   09/09/22 1300 -- 81 16 117/65 --   09/09/22 1245 -- 78 16 117/67 --   09/09/22 1230 -- 80 16 129/72 --   09/09/22 1215 -- 83 16 (!) 156/73 --   09/09/22 1205 98.1 °F (36.7 °C) 81 16 134/76 --   09/09/22 1145 -- 85 16 (!) 150/79 98 %   09/09/22 1140 -- 85 14 (!) 157/74 98 %   09/09/22 1135 -- 85 18 (!) 157/78 97 %   09/09/22 1130 -- 85 18 (!) 152/77 100 %   09/09/22 1029 -- 87 18 (!) 145/70 100 %   09/09/22 1022 -- 87 18 (!) 140/79 100 %   09/09/22 1006 98 °F (36.7 °C) 90 18 (!) 142/71 100 %         Intake/Output Summary (Last 24 hours) at 9/10/2022 1002  Last data filed at 9/10/2022 0410  Gross per 24 hour   Intake --   Output 4375 ml   Net -4375 ml          I had a face to face encounter and independently examined this patient on 9/10/2022, as outlined below:  PHYSICAL EXAM:  General: WD, WN. Alert, cooperative, no acute distress    EENT:  EOMI. Anicteric sclerae. MMM  Resp:  CTA bilaterally, no wheezing or rales. No accessory muscle use  CV:  Regular  rhythm,  No edema  GI:  Soft, Non distended, Non tender. +Bowel sounds  Neurologic:  Alert and oriented X 3, normal speech,   Psych:   Good insight. Not anxious nor agitated  Skin:  No rashes. No jaundice    Reviewed most current lab test results and cultures  YES  Reviewed most current radiology test results   YES  Review and summation of old records today    NO  Reviewed patient's current orders and MAR    YES  PMH/SH reviewed - no change compared to H&P  ________________________________________________________________________  Care Plan discussed with:    Comments   Patient x    Family      RN x    Care Manager     Consultant                        Multidiciplinary team rounds were held today with , nursing, pharmacist and clinical coordinator. Patient's plan of care was discussed; medications were reviewed and discharge planning was addressed. ________________________________________________________________________  Total NON critical care TIME:  35   Minutes    Total CRITICAL CARE TIME Spent:   Minutes non procedure based      Comments   >50% of visit spent in counseling and coordination of care     ________________________________________________________________________  Bryant Barbour MD     Procedures: see electronic medical records for all procedures/Xrays and details which were not copied into this note but were reviewed prior to creation of Plan. LABS:  I reviewed today's most current labs and imaging studies.   Pertinent labs include:  Recent Labs     09/10/22  0256 09/09/22  0316 09/08/22  0244   WBC 9.9 8.0 8.3   HGB 9.0* 8.9* 8.2*   HCT 28.1* 27.3* 25.9*    235 248       Recent Labs     09/10/22  0256 09/09/22  0316 09/08/22  0244   * 134*  133* 134*   K 4.0 4.3 4.3 4.3   CL 95* 97  97 102   CO2 24 25  28 26   * 344*  352* 68   BUN 26* 37*  39* 48*   CREA 2.97* 3.41*  3.42* 4.29*   CA 9.4 9.0  8.8 8.3*   PHOS 3.5 3.7 5.0*   ALB 2.4* 2.3* 2.4*         Signed: Kezia Snell MD

## 2022-09-10 NOTE — ROUTINE PROCESS
End of Shift Note    Bedside shift change report given to German  (oncoming nurse) by Cami Iglesias RN (offgoing nurse). Report included the following information SBAR    Shift worked: [id number]     Shift summary and any significant changes:    no     Concerns for physician to address: no     Zone phone for oncoming shift:  no        Activity:  Activity Level: Up with Assistance  Number times ambulated in hallways past shift: 0  Number of times OOB to chair past shift: 3    Cardiac:   Cardiac Monitoring: Yes      Cardiac Rhythm: Sinus Rhythm    Access:  Current line(s): PIV     Genitourinary:   Urinary status: hansen    Respiratory:   O2 Device: None (Room air)  Chronic home O2 use?: NO  Incentive spirometer at bedside: NO       GI:  Last Bowel Movement Date: 09/09/22  Current diet:  DIET ONE TIME MESSAGE  ADULT DIET Regular; 3 carb choices (45 gm/meal); No Salt Added (3-4 gm); Low Potassium (Less than 3000 mg/day)  DIET ONE TIME MESSAGE  Passing flatus: YES  Tolerating current diet: YES       Pain Management:   Patient states pain is manageable on current regimen: YES    Skin:  Corey Score: 21  Interventions: increase time out of bed    Patient Safety:  Fall Score:  Total Score: 4  Interventions: assistive device (walker, cane, etc)  High Fall Risk: Yes    Length of Stay:  Expected LOS: 3d 14h  Actual LOS: 4      Cami Iglesias RN

## 2022-09-10 NOTE — PROGRESS NOTES
OCCUPATIONAL THERAPY EVALUATION/DISCHARGE  Patient: Abhinav Thompson (40 y.o. male)  Date: 9/10/2022  Primary Diagnosis: Respiratory failure ([facility] [address] [J96.90]       Precautions:       ASSESSMENT  Based on the objective data described below, the patient presents with decreased endurance and strength following admission for respiratory failure. At baseline pt lives with his mother, is independent-mod I for ADLs and functional mobility, recently began using rollator PRN. He was received supine in bed, agreeable to participate. He transferred supine>sit with mod I and demo'd good sitting balance EOB while donning socks with supervision. Pt ambulated in room, performed toilet transfer and standing grooming ADL with SBA-supervision, no LOB observed. Transferred to chair at end of session with needs met and VSS on RA. He reports he is near his functional baseline at this time and had no further questions or concerns for acute OT. Will complete OT orders at this time. Current Level of Function (ADLs/self-care): independent-supervision ADLs    Functional Outcome Measure: The patient scored 60/100 on the Barthel Index outcome measure. Other factors to consider for discharge: lives with mother     PLAN :  Recommend with staff: Encourage OOB for meals    Recommendation for discharge: (in order for the patient to meet his/her long term goals)  No skilled occupational therapy/ follow up rehabilitation needs identified at this time. This discharge recommendation:  Has been made in collaboration with the attending provider and/or case management    IF patient discharges home will need the following DME: patient owns DME required for discharge       SUBJECTIVE:   Patient stated I feel ok.     OBJECTIVE DATA SUMMARY:   HISTORY:   Past Medical History:   Diagnosis Date    Bipolar 1 disorder, depressed ([facility] [address]     [facility]     Chronic kidney disease, stage 3a ([facility] [address]     Depression     Diabetes ([facility] [address]     DKA, type 1 ([facility] [address] 1/27/2013    diagnosed age 23    DKA, type 1 ([facility] [address]     H/O noncompliance with medical treatment, presenting hazards to health     MRSA (methicillin resistant staph aureus) culture positive     MRSA (methicillin resistant Staphylococcus aureus)     Face    Noncompliance with medication regimen     Second hand smoke exposure     Seizure ([facility]     Seizures ([facility] [address] 2006 or 2007    one episode during senior care     Past Surgical History:   Procedure Laterality Date    HX HEENT      top left wisdom tooth    HX ORTHOPAEDIC Left     wrist; MCV    IR INSERT NON TUNL CVC OVER 5 YRS  9/7/2022    IR INSERT TUNL CVC W/O PORT OVER 5 YR  9/9/2022    UPPER GI ENDOSCOPY,BIOPSY  11/20/2018            Prior Level of Function/Environment/Context: Independent-mod I ADLs and functional mobility, lives with mother  Expanded or extensive additional review of patient history:   Home Situation  Home Environment: Trailer/mobile home  # Steps to Enter: 6  One/Two Story Residence: One story  Living Alone: No  Support Systems: Other Family Member(s)  Patient Expects to be Discharged to[de-identified] Home with family assistance  Current DME Used/Available at Home: Walker, rollator    Hand dominance: Right    EXAMINATION OF PERFORMANCE DEFICITS:  Cognitive/Behavioral Status:  Neurologic State: Alert  Orientation Level: Oriented X4  Cognition: Appropriate decision making; Appropriate safety awareness           Hearing: Auditory  Auditory Impairment: None      Range of Motion:  AROM: Generally decreased, functional  PROM: Generally decreased, functional       Strength:  Strength: Generally decreased, functional          Coordination:  Coordination: Within functional limits           Tone & Sensation:  Tone: Normal  Sensation: Intact           Balance:  Sitting: Intact  Standing: Impaired; With support  Standing - Static: Good;Constant support  Standing - Dynamic : Fair;Constant support    Functional Mobility and Transfers for ADLs:  Bed Mobility:  Rolling: Independent  Supine to Sit: Modified independent  Scooting: Modified independent    Transfers:  Sit to Stand: Supervision  Stand to Sit: Supervision  Bed to Chair: Supervision  Toilet Transfer : Supervision    ADL Assessment:  Feeding: Independent    Oral Facial Hygiene/Grooming: Supervision (standing)    Bathing: Supervision    Type of Bath: Chlorhexidine (CHG)    Upper Body Dressing: Modified independent    Lower Body Dressing: Supervision;Setup    Toileting: Supervision        ADL Intervention and task modifications:       Grooming  Position Performed: Standing  Washing Hands: Supervision        Functional Measure:    Barthel Index:  Bathin  Bladder: 10  Bowels: 10  Groomin  Dressin  Feeding: 10  Mobility: 0  Stairs: 0  Toilet Use: 5  Transfer (Bed to Chair and Back): 10  Total: 60/100      The Barthel ADL Index: Guidelines  1. The index should be used as a record of what a patient does, not as a record of what a patient could do. 2. The main aim is to establish degree of independence from any help, physical or verbal, however minor and for whatever reason. 3. The need for supervision renders the patient not independent. 4. A patient's performance should be established using the best available evidence. Asking the patient, friends/relatives and nurses are the usual sources, but direct observation and common sense are also important. However direct testing is not needed. 5. Usually the patient's performance over the preceding 24-48 hours is important, but occasionally longer periods will be relevant. 6. Middle categories imply that the patient supplies over 50 per cent of the effort. 7. Use of aids to be independent is allowed. Score Interpretation (from [address] [place] [address])    Independent   60-79 Minimally independent   40-59 Partially dependent   20-39 Very dependent   <20 Totally dependent     -Beltran Solis., Barthel, D.W. (1965). Functional evaluation: the Barthel Index.  [address] [facility] (14)2.  -CHARITY Roes, [address] (1997). The Barthel activities of daily living index: self-reporting versus actual performance in the old (> or = 75 years). Journal [address] 45(7), [facility], Javier Rodas., Dasia Mckeon. (1999). Measuring the change in disability after inpatient rehabilitation; comparison of the responsiveness of the Barthel Index and Functional [place] Measure. Journal of Neurology, Neurosurgery, and Psychiatry, 66(4), 389-109. KADEEM Haynes, ANDERS Barnes, & Ksenia Alfaro M.A. (2004) Assessment of post-stroke quality of life in cost-effectiveness studies: The usefulness of the Barthel Index and the EuroQoL-5D. Quality of Life Research, 15, 610-93        Occupational Therapy Evaluation Charge Determination   History Examination Decision-Making   LOW Complexity : Brief history review  LOW Complexity : 1-3 performance deficits relating to physical, cognitive , or psychosocial skils that result in activity limitations and / or participation restrictions  LOW Complexity : No comorbidities that affect functional and no verbal or physical assistance needed to complete eval tasks       Based on the above components, the patient evaluation is determined to be of the following complexity level: LOW   Pain Rating:  Pt reported mild abdominal pain during session    Activity Tolerance: WNL    After treatment patient left in no apparent distress:    Sitting in chair and Call bell within reach    COMMUNICATION/EDUCATION:   The patients plan of care was discussed with: Physical therapist and Registered nurse.      Thank you for this referral.  Ti Reed OT  Time Calculation: 18 mins

## 2022-09-10 NOTE — PROGRESS NOTES
Labs stable, next HD Monday  Call with any issues over the weekend        Linsey Langston MD  [facility]   [address] [address]  Phone - [phone]   Fax - [phone]  www. [facility]

## 2022-09-10 NOTE — PROGRESS NOTES
Problem: Diabetes Self-Management  Goal: *Disease process and treatment process  Description: Define diabetes and identify own type of diabetes; list 3 options for treating diabetes. Outcome: Progressing Towards Goal  Goal: *Incorporating nutritional management into lifestyle  Description: Describe effect of type, amount and timing of food on blood glucose; list 3 methods for planning meals. Outcome: Progressing Towards Goal  Goal: *Incorporating physical activity into lifestyle  Description: State effect of exercise on blood glucose levels. Outcome: Progressing Towards Goal  Goal: *Developing strategies to promote health/change behavior  Description: Define the ABC's of diabetes; identify appropriate screenings, schedule and personal plan for screenings. Outcome: Progressing Towards Goal  Goal: *Using medications safely  Description: State effect of diabetes medications on diabetes; name diabetes medication taking, action and side effects. Outcome: Progressing Towards Goal  Goal: *Monitoring blood glucose, interpreting and using results  Description: Identify recommended blood glucose targets  and personal targets. Outcome: Progressing Towards Goal  Goal: *Prevention, detection, treatment of acute complications  Description: List symptoms of hyper- and hypoglycemia; describe how to treat low blood sugar and actions for lowering  high blood glucose level. Outcome: Progressing Towards Goal  Goal: *Prevention, detection and treatment of chronic complications  Description: Define the natural course of diabetes and describe the relationship of blood glucose levels to long term complications of diabetes.   Outcome: Progressing Towards Goal  Goal: *Developing strategies to address psychosocial issues  Description: Describe feelings about living with diabetes; identify support needed and support network  Outcome: Progressing Towards Goal  Goal: *Insulin pump training  Outcome: Progressing Towards Goal  Goal: *Sick day guidelines  Outcome: Progressing Towards Goal  Goal: *Patient Specific Goal (EDIT GOAL, INSERT TEXT)  Outcome: Progressing Towards Goal     Problem: Patient Education: Go to Patient Education Activity  Goal: Patient/Family Education  Outcome: Progressing Towards Goal     Problem: Falls - Risk of  Goal: *Absence of Falls  Description: Document Kristian Simmons Fall Risk and appropriate interventions in the flowsheet. Outcome: Progressing Towards Goal  Note: Fall Risk Interventions:  Mobility Interventions: Bed/chair exit alarm         Medication Interventions: Teach patient to arise slowly    Elimination Interventions: Call light in reach    History of Falls Interventions: Door open when patient unattended         Problem: Patient Education: Go to Patient Education Activity  Goal: Patient/Family Education  Outcome: Progressing Towards Goal     Problem: Diabetes Self-Management  Goal: *Disease process and treatment process  Description: Define diabetes and identify own type of diabetes; list 3 options for treating diabetes. Outcome: Progressing Towards Goal  Goal: *Incorporating nutritional management into lifestyle  Description: Describe effect of type, amount and timing of food on blood glucose; list 3 methods for planning meals. Outcome: Progressing Towards Goal  Goal: *Incorporating physical activity into lifestyle  Description: State effect of exercise on blood glucose levels. Outcome: Progressing Towards Goal  Goal: *Developing strategies to promote health/change behavior  Description: Define the ABC's of diabetes; identify appropriate screenings, schedule and personal plan for screenings. Outcome: Progressing Towards Goal  Goal: *Using medications safely  Description: State effect of diabetes medications on diabetes; name diabetes medication taking, action and side effects.   Outcome: Progressing Towards Goal  Goal: *Monitoring blood glucose, interpreting and using results  Description: Identify recommended blood glucose targets  and personal targets. Outcome: Progressing Towards Goal  Goal: *Prevention, detection, treatment of acute complications  Description: List symptoms of hyper- and hypoglycemia; describe how to treat low blood sugar and actions for lowering  high blood glucose level. Outcome: Progressing Towards Goal  Goal: *Prevention, detection and treatment of chronic complications  Description: Define the natural course of diabetes and describe the relationship of blood glucose levels to long term complications of diabetes. Outcome: Progressing Towards Goal  Goal: *Developing strategies to address psychosocial issues  Description: Describe feelings about living with diabetes; identify support needed and support network  Outcome: Progressing Towards Goal  Goal: *Insulin pump training  Outcome: Progressing Towards Goal  Goal: *Sick day guidelines  Outcome: Progressing Towards Goal  Goal: *Patient Specific Goal (EDIT GOAL, INSERT TEXT)  Outcome: Progressing Towards Goal     Problem: Patient Education: Go to Patient Education Activity  Goal: Patient/Family Education  Outcome: Progressing Towards Goal     Problem: Falls - Risk of  Goal: *Absence of Falls  Description: Document Shannon Fall Risk and appropriate interventions in the flowsheet. Outcome: Progressing Towards Goal  Note: Fall Risk Interventions:  Mobility Interventions: Bed/chair exit alarm         Medication Interventions: Teach patient to arise slowly    Elimination Interventions: Call light in reach    History of Falls Interventions: Door open when patient unattended         Problem: Patient Education: Go to Patient Education Activity  Goal: Patient/Family Education  Outcome: Progressing Towards Goal     Problem: Diabetes Self-Management  Goal: *Disease process and treatment process  Description: Define diabetes and identify own type of diabetes; list 3 options for treating diabetes.   Outcome: Progressing Towards Goal  Goal: *Incorporating nutritional management into lifestyle  Description: Describe effect of type, amount and timing of food on blood glucose; list 3 methods for planning meals. Outcome: Progressing Towards Goal  Goal: *Incorporating physical activity into lifestyle  Description: State effect of exercise on blood glucose levels. Outcome: Progressing Towards Goal  Goal: *Developing strategies to promote health/change behavior  Description: Define the ABC's of diabetes; identify appropriate screenings, schedule and personal plan for screenings. Outcome: Progressing Towards Goal  Goal: *Using medications safely  Description: State effect of diabetes medications on diabetes; name diabetes medication taking, action and side effects. Outcome: Progressing Towards Goal  Goal: *Monitoring blood glucose, interpreting and using results  Description: Identify recommended blood glucose targets  and personal targets. Outcome: Progressing Towards Goal  Goal: *Prevention, detection, treatment of acute complications  Description: List symptoms of hyper- and hypoglycemia; describe how to treat low blood sugar and actions for lowering  high blood glucose level. Outcome: Progressing Towards Goal  Goal: *Prevention, detection and treatment of chronic complications  Description: Define the natural course of diabetes and describe the relationship of blood glucose levels to long term complications of diabetes.   Outcome: Progressing Towards Goal  Goal: *Developing strategies to address psychosocial issues  Description: Describe feelings about living with diabetes; identify support needed and support network  Outcome: Progressing Towards Goal  Goal: *Insulin pump training  Outcome: Progressing Towards Goal  Goal: *Sick day guidelines  Outcome: Progressing Towards Goal  Goal: *Patient Specific Goal (EDIT GOAL, INSERT TEXT)  Outcome: Progressing Towards Goal     Problem: Patient Education: Go to Patient Education Activity  Goal: Patient/Family Education  Outcome: Progressing Towards Goal     Problem: Falls - Risk of  Goal: *Absence of Falls  Description: Document Kristina Simmons Fall Risk and appropriate interventions in the flowsheet.   Outcome: Progressing Towards Goal  Note: Fall Risk Interventions:  Mobility Interventions: Bed/chair exit alarm         Medication Interventions: Teach patient to arise slowly    Elimination Interventions: Call light in reach    History of Falls Interventions: Door open when patient unattended         Problem: Patient Education: Go to Patient Education Activity  Goal: Patient/Family Education  Outcome: Progressing Towards Goal

## 2022-09-10 NOTE — PROGRESS NOTES
PHYSICAL THERAPY EVALUATION/DISCHARGE  Patient: Gutierrez Mayo (27 y.o. male)  Date: 9/10/2022  Primary Diagnosis: Respiratory failure ([address] [J96.90]       Precautions: Fall risk         ASSESSMENT  Based on the objective data described below, the patient presents from home with complaint of increasing weakness and fatigue. Pt with difficulty breathing per chart, found to have B pleural effusions requiring medical intervention. Pt received for PT evaluation supine in bed, agreeable to session. He demonstrated bed mobility with mod I/ Supervision and rail. Sit <> Stands during session with SBA-Supervision and RW. Pt ambulated 40ft x2 in room with RW and Supervision, no LOB or buckling noted, with pt reporting he is near his functional baseline. Pt left seated end of session up in recliner chair, LEs elevated for comfort, and call bell in lap. RN aware of pt position and mobility status. He reports he is near his functional baseline at this time, will sign off. Functional Outcome Measure: The patient scored 60/100 on the Barthel Index outcome measure which is indicative of 40% impairment in mobility and ADLs. Other factors to consider for discharge: Lives with mother in 1 story home without ARIELLE. Further skilled acute physical therapy is not indicated at this time. PLAN :  Recommendation for discharge: (in order for the patient to meet his/her long term goals)  [facility] for chronic deconditioning. No further acute care PT needs. Encouraged ambulation with nursing staff and up to chair for meals. This discharge recommendation:  Has been made in collaboration with the attending provider and/or case management    IF patient discharges home will need the following DME: patient owns DME required for discharge       SUBJECTIVE:   Patient stated I feel okay today.     OBJECTIVE DATA SUMMARY:   HISTORY:    Past Medical History:   Diagnosis Date    Bipolar 1 disorder, depressed ([facility] [address]     Bipolar disorder ([facility] [address]     Chronic kidney disease, stage 3a ([facility] [address]     Depression     Diabetes ([facility] [address]     DKA, type 1 ([facility] [address] 1/27/2013    diagnosed age 23    DKA, type 1 ([facility] [address]     H/O noncompliance with medical treatment, presenting hazards to health     MRSA (methicillin resistant staph aureus) culture positive     MRSA (methicillin resistant Staphylococcus aureus)     Face    Noncompliance with medication regimen     Second hand smoke exposure     Seizure ([facility] [address]     Seizures ([facility] [address] 2006 or 2007    one episode during USP     Past Surgical History:   Procedure Laterality Date    HX HEENT      top left wisdom tooth    HX ORTHOPAEDIC Left     wrist; MCV    IR INSERT NON TUNL CVC OVER 5 YRS  9/7/2022    IR INSERT TUNL CVC W/O PORT OVER 5 YR  9/9/2022    UPPER GI ENDOSCOPY,BIOPSY  11/20/2018            Prior level of function: mod I with RW household distances  Personal factors and/or comorbidities impacting plan of care: complex medical history    Home Situation  Home Environment: Trailer/mobile home  # Steps to Enter: 6  One/Two Story Residence: One story  Living Alone: No  Support Systems: Other Family Member(s)  Patient Expects to be Discharged to[de-identified] Home with family assistance  Current DME Used/Available at Home: min [place]    EXAMINATION/PRESENTATION/DECISION MAKING:   Critical Behavior:  Neurologic State: Alert  Orientation Level: Oriented X4  Cognition: Appropriate decision making, Appropriate safety awareness     Hearing:   Auditory  Auditory Impairment: None  Skin:  appears intact  Edema: none noted  Range Of Motion:  AROM: Generally decreased, functional           PROM: Generally decreased, functional           Strength:    Strength: Generally decreased, functional                    Tone & Sensation:   Tone: Normal              Sensation: Intact               Coordination:  Coordination: Within functional limits  Vision:      Functional Mobility:  Bed Mobility:  Rolling: Independent  Supine to Sit: Modified independent     Scooting: Modified independent  Transfers:  Sit to Stand: Supervision  Stand to Sit: Supervision  Stand Pivot Transfers: Minimum assistance     Bed to Chair: Supervision              Balance:   Sitting: Intact  Standing: Impaired; With support  Standing - Static: Good;Constant support  Standing - Dynamic : Fair;Constant support  Ambulation/Gait Training:  Distance (ft): 40 Feet (ft) (x2 with seated rest between bouts on commode)  Assistive Device: Gait belt; Other (comment) (B hand held assist)  Ambulation - Level of Assistance: Supervision        Gait Abnormalities: Decreased step clearance; Path deviations; Shuffling gait        Base of Support: Widened     Speed/Ana: Slow;Shuffled  Step Length: Right shortened;Left shortened            Functional Measure:  Barthel Index:    Bathin  Bladder: 10  Bowels: 10  Groomin  Dressin  Feeding: 10  Mobility: 0  Stairs: 0  Toilet Use: 5  Transfer (Bed to Chair and Back): 10  Total: 60/100       The Barthel ADL Index: Guidelines  1. The index should be used as a record of what a patient does, not as a record of what a patient could do. 2. The main aim is to establish degree of independence from any help, physical or verbal, however minor and for whatever reason. 3. The need for supervision renders the patient not independent. 4. A patient's performance should be established using the best available evidence. Asking the patient, friends/relatives and nurses are the usual sources, but direct observation and common sense are also important. However direct testing is not needed. 5. Usually the patient's performance over the preceding 24-48 hours is important, but occasionally longer periods will be relevant. 6. Middle categories imply that the patient supplies over 50 per cent of the effort. 7. Use of aids to be independent is allowed.     Score Interpretation (from [address] [address])    Independent   60-79 Minimally independent 40-59 Partially dependent   20-39 Very dependent   <20 Totally dependent     -Zonia Solis, Barthel, D.W. (0012). Functional evaluation: the Barthel Index. [address] ([address] (1997). The Barthel activities of daily living index: self-reporting versus actual performance in the old (> or = 75 years). Journal of [address] [place] [address] 45(7), [facility], J.JCLAIR.F, Kalyani Capps., Milagro Spakrs. (1999). Measuring the change in disability after inpatient rehabilitation; comparison of the responsiveness of the Barthel Index and Functional [place] Measure. Journal of Neurology, Neurosurgery, and Psychiatry, 66(4), 269-402. CHRISTOPH Wick.EDDIE, ANDERS Barnes, & Martha Rodriguez MJETT. (2004) Assessment of post-stroke quality of life in cost-effectiveness studies: The usefulness of the Barthel Index and the EuroQoL-5D. Quality of Life Research, 15, 504-72           Physical Therapy Evaluation Charge Determination   History Examination Presentation Decision-Making   LOW Complexity : Zero comorbidities / personal factors that will impact the outcome / POC LOW Complexity : 1-2 Standardized tests and measures addressing body structure, function, activity limitation and / or participation in recreation  LOW Complexity : Stable, uncomplicated  LOW Complexity : FOTO score of       Based on the above components, the patient evaluation is determined to be of the following complexity level: LOW     Pain Rating:  Denies pain    Activity Tolerance: WNL      After treatment patient left in no apparent distress:   Sitting in chair, Heels elevated for pressure relief, and Call bell within reach    COMMUNICATION/EDUCATION:   The patients plan of care was discussed with: Occupational therapist and Registered nurse. Fall prevention education was provided and the patient/caregiver indicated understanding.     Thank you for this referral.  Arcelia Myrick, PT, DPT, NCS   Time Calculation: 18 mins

## 2022-09-11 ENCOUNTER — APPOINTMENT (OUTPATIENT)
Dept: GENERAL RADIOLOGY | Age: 42
DRG: 470 | End: 2022-09-11
Attending: INTERNAL MEDICINE
Payer: COMMERCIAL

## 2022-09-11 LAB
ALBUMIN SERPL-MCNC: 2.2 G/DL (ref 3.5–5)
ANION GAP SERPL CALC-SCNC: 6 MMOL/L (ref 5–15)
BUN SERPL-MCNC: 36 MG/DL (ref 6–20)
BUN/CREAT SERPL: 9 (ref 12–20)
CALCIUM SERPL-MCNC: 8.9 MG/DL (ref 8.5–10.1)
CHLORIDE SERPL-SCNC: 96 MMOL/L (ref 97–108)
CO2 SERPL-SCNC: 29 MMOL/L (ref 21–32)
COMMENT, HOLDF: NORMAL
CREAT SERPL-MCNC: 4.05 MG/DL (ref 0.7–1.3)
ERYTHROCYTE [DISTWIDTH] IN BLOOD BY AUTOMATED COUNT: 14.1 % (ref 11.5–14.5)
GLUCOSE BLD STRIP.AUTO-MCNC: 206 MG/DL (ref 65–117)
GLUCOSE BLD STRIP.AUTO-MCNC: 215 MG/DL (ref 65–117)
GLUCOSE BLD STRIP.AUTO-MCNC: 241 MG/DL (ref 65–117)
GLUCOSE BLD STRIP.AUTO-MCNC: 288 MG/DL (ref 65–117)
GLUCOSE SERPL-MCNC: 256 MG/DL (ref 65–100)
HBV SURFACE AG SER QL: <0.1 INDEX
HBV SURFACE AG SER QL: NEGATIVE
HCT VFR BLD AUTO: 26.8 % (ref 36.6–50.3)
HCV AB SER IA-ACNC: 6.25 INDEX
HCV AB SERPL QL IA: REACTIVE
HGB BLD-MCNC: 8.9 G/DL (ref 12.1–17)
HIV1 P24 AG SERPL QL IA: NONREACTIVE
HIV1+2 AB SERPL QL IA: NONREACTIVE
MCH RBC QN AUTO: 28.3 PG (ref 26–34)
MCHC RBC AUTO-ENTMCNC: 33.2 G/DL (ref 30–36.5)
MCV RBC AUTO: 85.1 FL (ref 80–99)
NRBC # BLD: 0 K/UL (ref 0–0.01)
NRBC BLD-RTO: 0 PER 100 WBC
PHOSPHATE SERPL-MCNC: 3.7 MG/DL (ref 2.6–4.7)
PLATELET # BLD AUTO: 276 K/UL (ref 150–400)
PMV BLD AUTO: 10.9 FL (ref 8.9–12.9)
POTASSIUM SERPL-SCNC: 3.4 MMOL/L (ref 3.5–5.1)
RBC # BLD AUTO: 3.15 M/UL (ref 4.1–5.7)
SAMPLES BEING HELD,HOLD: NORMAL
SERVICE CMNT-IMP: ABNORMAL
SODIUM SERPL-SCNC: 131 MMOL/L (ref 136–145)
WBC # BLD AUTO: 8.5 K/UL (ref 4.1–11.1)

## 2022-09-11 PROCEDURE — 77030027138 HC INCENT SPIROMETER -A

## 2022-09-11 PROCEDURE — 36415 COLL VENOUS BLD VENIPUNCTURE: CPT

## 2022-09-11 PROCEDURE — 85027 COMPLETE CBC AUTOMATED: CPT

## 2022-09-11 PROCEDURE — 74011250637 HC RX REV CODE- 250/637: Performed by: INTERNAL MEDICINE

## 2022-09-11 PROCEDURE — 65270000029 HC RM PRIVATE

## 2022-09-11 PROCEDURE — 80069 RENAL FUNCTION PANEL: CPT

## 2022-09-11 PROCEDURE — 74011636637 HC RX REV CODE- 636/637: Performed by: INTERNAL MEDICINE

## 2022-09-11 PROCEDURE — 82962 GLUCOSE BLOOD TEST: CPT

## 2022-09-11 PROCEDURE — 74011000250 HC RX REV CODE- 250: Performed by: INTERNAL MEDICINE

## 2022-09-11 PROCEDURE — 71045 X-RAY EXAM CHEST 1 VIEW: CPT

## 2022-09-11 PROCEDURE — 94760 N-INVAS EAR/PLS OXIMETRY 1: CPT

## 2022-09-11 RX ORDER — OXYCODONE AND ACETAMINOPHEN 5; 325 MG/1; MG/1
2 TABLET ORAL
Status: DISCONTINUED | OUTPATIENT
Start: 2022-09-11 | End: 2022-09-12 | Stop reason: HOSPADM

## 2022-09-11 RX ORDER — POTASSIUM CHLORIDE 750 MG/1
40 TABLET, FILM COATED, EXTENDED RELEASE ORAL ONCE
Status: COMPLETED | OUTPATIENT
Start: 2022-09-11 | End: 2022-09-11

## 2022-09-11 RX ORDER — INSULIN GLARGINE 100 [IU]/ML
25 INJECTION, SOLUTION SUBCUTANEOUS
Status: DISCONTINUED | OUTPATIENT
Start: 2022-09-11 | End: 2022-09-12 | Stop reason: HOSPADM

## 2022-09-11 RX ADMIN — OXYCODONE AND ACETAMINOPHEN 1 TABLET: 5; 325 TABLET ORAL at 05:38

## 2022-09-11 RX ADMIN — PREGABALIN 75 MG: 50 CAPSULE ORAL at 17:00

## 2022-09-11 RX ADMIN — HYDRALAZINE HYDROCHLORIDE 100 MG: 50 TABLET, FILM COATED ORAL at 16:52

## 2022-09-11 RX ADMIN — BUMETANIDE 2 MG: 0.25 INJECTION INTRAMUSCULAR; INTRAVENOUS at 16:52

## 2022-09-11 RX ADMIN — HYDRALAZINE HYDROCHLORIDE 100 MG: 50 TABLET, FILM COATED ORAL at 09:46

## 2022-09-11 RX ADMIN — AMLODIPINE BESYLATE 10 MG: 5 TABLET ORAL at 09:46

## 2022-09-11 RX ADMIN — Medication 2 UNITS: at 16:52

## 2022-09-11 RX ADMIN — INSULIN GLARGINE 25 UNITS: 100 INJECTION, SOLUTION SUBCUTANEOUS at 21:57

## 2022-09-11 RX ADMIN — CARVEDILOL 12.5 MG: 12.5 TABLET, FILM COATED ORAL at 16:53

## 2022-09-11 RX ADMIN — Medication 2 UNITS: at 09:46

## 2022-09-11 RX ADMIN — OXYCODONE AND ACETAMINOPHEN 2 TABLET: 5; 325 TABLET ORAL at 16:57

## 2022-09-11 RX ADMIN — PREGABALIN 75 MG: 50 CAPSULE ORAL at 09:46

## 2022-09-11 RX ADMIN — Medication 2 UNITS: at 12:26

## 2022-09-11 RX ADMIN — Medication 2 UNITS: at 21:57

## 2022-09-11 RX ADMIN — TAMSULOSIN HYDROCHLORIDE 0.4 MG: 0.4 CAPSULE ORAL at 09:46

## 2022-09-11 RX ADMIN — CARVEDILOL 12.5 MG: 12.5 TABLET, FILM COATED ORAL at 09:46

## 2022-09-11 RX ADMIN — ROSUVASTATIN CALCIUM 10 MG: 10 TABLET, FILM COATED ORAL at 21:57

## 2022-09-11 RX ADMIN — OXYCODONE AND ACETAMINOPHEN 2 TABLET: 5; 325 TABLET ORAL at 12:25

## 2022-09-11 RX ADMIN — POTASSIUM CHLORIDE 40 MEQ: 750 TABLET, FILM COATED, EXTENDED RELEASE ORAL at 12:25

## 2022-09-11 RX ADMIN — BUMETANIDE 2 MG: 0.25 INJECTION INTRAMUSCULAR; INTRAVENOUS at 21:57

## 2022-09-11 RX ADMIN — BUMETANIDE 2 MG: 0.25 INJECTION INTRAMUSCULAR; INTRAVENOUS at 05:30

## 2022-09-11 RX ADMIN — HYDRALAZINE HYDROCHLORIDE 100 MG: 50 TABLET, FILM COATED ORAL at 21:57

## 2022-09-11 RX ADMIN — PANTOPRAZOLE SODIUM 40 MG: 40 TABLET, DELAYED RELEASE ORAL at 09:46

## 2022-09-11 RX ADMIN — OXYCODONE AND ACETAMINOPHEN 2 TABLET: 5; 325 TABLET ORAL at 21:57

## 2022-09-11 NOTE — PROGRESS NOTES
Hospitalist Progress Note    NAME: Sally Krishnan   :  1980   MRN:  [id number]       Assessment / Plan:  acute Hypoxic Respiratory Failure  2/2 to bilateral pleural effusions  Arden on ckd 4  Started on hemodialysis on   CT chest with moderate bilateral effusions L>R  On 3L O2, keep supplemental O2 for sats >90%  Continue with IV Lasix, he is oliguric  Troponin wnl, EKG without acute st segment changes  Pro BNP 5,688  ECHO with EF 18-77%, normal diastolic function (56)  No indication to repeat ECHO at this time  No improvement despite IV Lasix, nephrology decided to start hemodialysis via Leonardo Aparicio his first HD treatment ton , got his second treatment today  Is to place a permacath tomorrow and plan for outpatient hemodialysis set up  We will repeat chest x-ray, if persistent pleural effusion will consider thoracocentesis  N.p.o. after midnight  : Patient underwent left-sided thoracocentesis which removed about 800 cc of fluid, will follow-up pleural fluid studies  Had a permacath placement  09/10: Patient is complaining of nausea and vomiting , complain of reflux  Continue PPI  :  pt is complaining of left flank pain which is worse upon deep inspiration  Will check chest x-ray    Urinary retention  Beard in place from last admission, failed voiding trial, Beard replaced  Cont. tamsulosin    DM [id number] units SSI  HTN- cont. meds  Normocytic Anemia At baseline Hgb monitor  Correct Electrolytes as needed  PT/OT   CM/SW    Code Status: full  Surrogate Decision Maker: Cheo Patriciasherry  [phone]    DVT Prophylaxis: heparin  GI Prophylaxis: not indicated    Baseline: home IADL's    25.0 - 29.9 Overweight / Body mass index is 26.58 kg/m².     Estimated discharge date:   Barriers:    Code status: Full  Prophylaxis: Hep SQ  Recommended Disposition: Home w/Family     Subjective:     Chief Complaint / Reason for Physician Visit  Follow-up acute respiratory failure with hypoxia, worsening CKD. C/o of left flank pain upon DC inspiration   discussed with RN events overnight. Review of Systems:  Symptom Y/N Comments  Symptom Y/N Comments   Fever/Chills n   Chest Pain n    Poor Appetite    Edema     Cough n   Abdominal Pain n    Sputum    Joint Pain     SOB/JOHNSTON y   Pruritis/Rash     Nausea/vomit Y/y    Tolerating PT/OT     Diarrhea n   Tolerating Diet n    Constipation    Other       Could NOT obtain due to:      Objective:     VITALS:   Last 24hrs VS reviewed since prior progress note. Most recent are:  Patient Vitals for the past 24 hrs:   Temp Pulse Resp BP SpO2   09/11/22 0904 97.7 °F (36.5 °C) 78 18 (!) 159/90 92 %   09/11/22 0500 97.9 °F (36.6 °C) 78 18 (!) 155/80 93 %   09/10/22 2342 -- -- -- (!) 142/84 --   09/10/22 2215 98.2 °F (36.8 °C) 84 18 (!) 184/82 92 %   09/10/22 1527 98.4 °F (36.9 °C) 86 16 (!) 157/72 91 %   09/10/22 1223 98.5 °F (36.9 °C) 85 20 (!) 142/70 94 %       No intake or output data in the 24 hours ending 09/11/22 1028       I had a face to face encounter and independently examined this patient on 9/11/2022, as outlined below:  PHYSICAL EXAM:  General: WD, WN. Alert, cooperative, no acute distress    EENT:  EOMI. Anicteric sclerae. MMM  Resp:  CTA bilaterally, no wheezing or rales. No accessory muscle use  CV:  Regular  rhythm,  No edema  GI:  Soft, Non distended, Non tender. +Bowel sounds  Neurologic:  Alert and oriented X 3, normal speech,   Psych:   Good insight. Not anxious nor agitated  Skin:  No rashes.   No jaundice    Reviewed most current lab test results and cultures  YES  Reviewed most current radiology test results   YES  Review and summation of old records today    NO  Reviewed patient's current orders and MAR    YES  PMH/SH reviewed - no change compared to H&P  ________________________________________________________________________  Care Plan discussed with:    Comments   Patient x    Family      RN x    Care Manager     Consultant Multidiciplinary team rounds were held today with , nursing, pharmacist and clinical coordinator. Patient's plan of care was discussed; medications were reviewed and discharge planning was addressed. ________________________________________________________________________  Total NON critical care TIME:  35   Minutes    Total CRITICAL CARE TIME Spent:   Minutes non procedure based      Comments   >50% of visit spent in counseling and coordination of care     ________________________________________________________________________  Briana Benedict MD     Procedures: see electronic medical records for all procedures/Xrays and details which were not copied into this note but were reviewed prior to creation of Plan. LABS:  I reviewed today's most current labs and imaging studies.   Pertinent labs include:  Recent Labs     09/11/22  0306 09/10/22  0256 09/09/22  0316   WBC 8.5 9.9 8.0   HGB 8.9* 9.0* 8.9*   HCT 26.8* 28.1* 27.3*    261 235       Recent Labs     09/11/22  0306 09/10/22  1852 09/10/22  0256 09/09/22  0316   *  --  133* 134*  133*   K 3.4*  --  4.0 4.3  4.3   CL 96*  --  95* 97  97   CO2 29  --  24 25  28   *  --  353* 344*  352*   BUN 36*  --  26* 37*  39*   CREA 4.05*  --  2.97* 3.41*  3.42*   CA 8.9  --  9.4 9.0  8.8   PHOS 3.7  --  3.5 3.7   ALB 2.2* 2.5* 2.4* 2.3*   TBILI  --  0.3  --   --    ALT  --  25  --   --          Signed: Briana Benedict MD

## 2022-09-12 VITALS
RESPIRATION RATE: 18 BRPM | TEMPERATURE: 98.7 F | WEIGHT: 180 LBS | DIASTOLIC BLOOD PRESSURE: 77 MMHG | HEART RATE: 85 BPM | SYSTOLIC BLOOD PRESSURE: 128 MMHG | BODY MASS INDEX: 26.66 KG/M2 | OXYGEN SATURATION: 95 % | HEIGHT: 69 IN

## 2022-09-12 LAB
ALBUMIN SERPL-MCNC: 2.5 G/DL (ref 3.5–5)
ANION GAP SERPL CALC-SCNC: 8 MMOL/L (ref 5–15)
ANION GAP SERPL CALC-SCNC: 8 MMOL/L (ref 5–15)
BUN SERPL-MCNC: 45 MG/DL (ref 6–20)
BUN SERPL-MCNC: 45 MG/DL (ref 6–20)
BUN/CREAT SERPL: 9 (ref 12–20)
BUN/CREAT SERPL: 9 (ref 12–20)
CALCIUM SERPL-MCNC: 9.5 MG/DL (ref 8.5–10.1)
CALCIUM SERPL-MCNC: 9.7 MG/DL (ref 8.5–10.1)
CHLORIDE SERPL-SCNC: 95 MMOL/L (ref 97–108)
CHLORIDE SERPL-SCNC: 95 MMOL/L (ref 97–108)
CO2 SERPL-SCNC: 29 MMOL/L (ref 21–32)
CO2 SERPL-SCNC: 29 MMOL/L (ref 21–32)
CREAT SERPL-MCNC: 4.89 MG/DL (ref 0.7–1.3)
CREAT SERPL-MCNC: 4.94 MG/DL (ref 0.7–1.3)
ERYTHROCYTE [DISTWIDTH] IN BLOOD BY AUTOMATED COUNT: 14.2 % (ref 11.5–14.5)
GLUCOSE BLD STRIP.AUTO-MCNC: 130 MG/DL (ref 65–117)
GLUCOSE SERPL-MCNC: 198 MG/DL (ref 65–100)
GLUCOSE SERPL-MCNC: 200 MG/DL (ref 65–100)
HCT VFR BLD AUTO: 33 % (ref 36.6–50.3)
HGB BLD-MCNC: 10.5 G/DL (ref 12.1–17)
MCH RBC QN AUTO: 27.3 PG (ref 26–34)
MCHC RBC AUTO-ENTMCNC: 31.8 G/DL (ref 30–36.5)
MCV RBC AUTO: 85.9 FL (ref 80–99)
NRBC # BLD: 0.02 K/UL (ref 0–0.01)
NRBC BLD-RTO: 0.3 PER 100 WBC
PHOSPHATE SERPL-MCNC: 5.4 MG/DL (ref 2.6–4.7)
PLATELET # BLD AUTO: 313 K/UL (ref 150–400)
PMV BLD AUTO: 11.2 FL (ref 8.9–12.9)
POTASSIUM SERPL-SCNC: 3.9 MMOL/L (ref 3.5–5.1)
POTASSIUM SERPL-SCNC: 4 MMOL/L (ref 3.5–5.1)
RBC # BLD AUTO: 3.84 M/UL (ref 4.1–5.7)
SERVICE CMNT-IMP: ABNORMAL
SODIUM SERPL-SCNC: 132 MMOL/L (ref 136–145)
SODIUM SERPL-SCNC: 132 MMOL/L (ref 136–145)
WBC # BLD AUTO: 7.1 K/UL (ref 4.1–11.1)

## 2022-09-12 PROCEDURE — 74011250637 HC RX REV CODE- 250/637: Performed by: INTERNAL MEDICINE

## 2022-09-12 PROCEDURE — 85027 COMPLETE CBC AUTOMATED: CPT

## 2022-09-12 PROCEDURE — 36415 COLL VENOUS BLD VENIPUNCTURE: CPT

## 2022-09-12 PROCEDURE — 80048 BASIC METABOLIC PNL TOTAL CA: CPT

## 2022-09-12 PROCEDURE — 74011000250 HC RX REV CODE- 250: Performed by: INTERNAL MEDICINE

## 2022-09-12 PROCEDURE — 77030040831 HC BAG URINE DRNG MDII -A

## 2022-09-12 PROCEDURE — 77030012865 HC BG URIN LEG MDII -A

## 2022-09-12 PROCEDURE — 90935 HEMODIALYSIS ONE EVALUATION: CPT

## 2022-09-12 PROCEDURE — 80069 RENAL FUNCTION PANEL: CPT

## 2022-09-12 PROCEDURE — 82962 GLUCOSE BLOOD TEST: CPT

## 2022-09-12 PROCEDURE — 74011250636 HC RX REV CODE- 250/636: Performed by: INTERNAL MEDICINE

## 2022-09-12 RX ORDER — ROSUVASTATIN CALCIUM 10 MG/1
10 TABLET, COATED ORAL
Qty: 30 TABLET | Refills: 1 | Status: SHIPPED | OUTPATIENT
Start: 2022-09-12 | End: 2022-11-11

## 2022-09-12 RX ORDER — INSULIN GLARGINE 100 [IU]/ML
20 INJECTION, SOLUTION SUBCUTANEOUS DAILY
Qty: 1 ML | Refills: 0 | Status: SHIPPED | OUTPATIENT
Start: 2022-09-12 | End: 2022-09-28

## 2022-09-12 RX ORDER — FUROSEMIDE 40 MG/1
80 TABLET ORAL 2 TIMES DAILY
Status: DISCONTINUED | OUTPATIENT
Start: 2022-09-12 | End: 2022-09-12 | Stop reason: HOSPADM

## 2022-09-12 RX ADMIN — TAMSULOSIN HYDROCHLORIDE 0.4 MG: 0.4 CAPSULE ORAL at 13:02

## 2022-09-12 RX ADMIN — HEPARIN SODIUM 2600 UNITS: 1000 INJECTION INTRAVENOUS; SUBCUTANEOUS at 11:15

## 2022-09-12 RX ADMIN — OXYCODONE AND ACETAMINOPHEN 2 TABLET: 5; 325 TABLET ORAL at 07:30

## 2022-09-12 RX ADMIN — AMLODIPINE BESYLATE 10 MG: 5 TABLET ORAL at 13:02

## 2022-09-12 RX ADMIN — FUROSEMIDE 80 MG: 40 TABLET ORAL at 13:02

## 2022-09-12 RX ADMIN — OXYCODONE AND ACETAMINOPHEN 2 TABLET: 5; 325 TABLET ORAL at 03:35

## 2022-09-12 RX ADMIN — OXYCODONE AND ACETAMINOPHEN 2 TABLET: 5; 325 TABLET ORAL at 13:02

## 2022-09-12 RX ADMIN — BUMETANIDE 2 MG: 0.25 INJECTION INTRAMUSCULAR; INTRAVENOUS at 05:27

## 2022-09-12 RX ADMIN — PREGABALIN 75 MG: 50 CAPSULE ORAL at 13:02

## 2022-09-12 NOTE — PROCEDURES
Hemodialysis / [phone]    Vitals Pre Post Assessment Pre Post   BP BP: (!) 140/84 (09/12/22 0741)   105/45 LOC A&Ox4 No change   HR Pulse (Heart Rate): 81 (09/12/22 0741) 78 Lungs clear No change   Resp Resp Rate: 18 (09/12/22 0741) 18 Cardiac RRR No change   Temp Temp: 97.6 °F (36.4 °C) (09/12/22 0741) 98.0 Skin CDI No change   Weight    Edema Generalized No change   Tele status   Pain Pain Intensity 1: 0 (09/12/22 0300)      Orders   Duration: Start: 4677 End: 3622 Total: 3.5hrs   Dialyzer: Dialyzer/Set Up Inspection: Abram Mitchell (09/12/22 0741)   K Bath: Dialysate K (mEq/L): 3 (09/12/22 0741)   Ca Bath: Dialysate CA (mEq/L): 2.5 (09/12/22 0741)   Na: Dialysate NA (mEq/L): 138 (09/12/22 0741)   Bicarb: Dialysate HCO3 (mEq/L): 35 (09/12/22 0741)   Target Fluid Removal: Goal/Amount of Fluid to Remove (mL): 3000 mL (09/12/22 0741)     Access   Type & Location: R PC : Dressing CDI. No s/s of infection. Both lumens aspirate & flush well. Running well at . SBAR received from Primary RN. Pt arrived to HD suite A&Ox4. Consent signed & on file. Each catheter limb disinfected per p&p, caps removed, hubs disinfected per p&p. Each lumen aspirated for blood return and flushed with Normal Saline per policy. VSS. Dialysis Tx initiated. Comments:    Dressing CDI. No s/s of infection noted.                                     Labs   HBsAg (Antigen) / date: Neg 9/7/22                                              HBsAb (Antibody) / date: Susc 9/7/22   Source: EPIC   Obtained/Reviewed  Critical Results Called HGB   Date Value Ref Range Status   09/12/2022 10.5 (L) 12.1 - 17.0 g/dL Final     Potassium   Date Value Ref Range Status   09/12/2022 4.0 3.5 - 5.1 mmol/L Final   09/12/2022 3.9 3.5 - 5.1 mmol/L Final     Calcium   Date Value Ref Range Status   09/12/2022 9.5 8.5 - 10.1 MG/DL Final   09/12/2022 9.7 8.5 - 10.1 MG/DL Final     BUN   Date Value Ref Range Status   09/12/2022 45 (H) 6 - 20 MG/DL Final   09/12/2022 45 (H) 6 - 20 MG/DL Final     Creatinine   Date Value Ref Range Status   09/12/2022 4.89 (H) 0.70 - 1.30 MG/DL Final   09/12/2022 4.94 (H) 0.70 - 1.30 MG/DL Final     Comment:     INVESTIGATED PER DELTA CHECK PROTOCOL        Meds Given   Name Dose Route   Heparin 1:1000 1.8 Silvia@Nitronex   Heparin 1:1000 1.8 Silvia@Nitronex          Adequacy / Fluid    Total Liters Process: 77.3   Net Fluid Removed: 1800mL      Comments   Time Out Done:   (Time) 0736   Admitting Diagnosis: Respiratory Failure   Consent obtained/signed: Informed Consent Verified: Yes (09/12/22 0741)   Machine / RO # Machine Number: [id number] (09/12/22 7301)   Primary Nurse Rpt Pre: Emory Herndon RN   Primary Nurse Rpt Post: Antoni Gallego RN   Pt Education: Procedural   Care Plan: On going   Pts outpatient clinic: TBD     Tx Summary  [phone]:  R PC : Dressing CDI. No s/s of infection. Both lumens aspirate & flush well. Running well at . SBAR received from Primary RN. Pt arrived to HD suite A&Ox4. Consent signed & on file. Each catheter limb disinfected per p&p, caps removed, hubs disinfected per p&p. Each lumen aspirated for blood return and flushed with Normal Saline per policy. VSS. Dialysis Tx initiated. 0745:  Pt resting  0800:  Pt resting  0815:  Pt resting  0830:  Pt resting  0845:  Pt resting  0900:  Pt resting  0915:  Goal reduced to pull 2500mL d/t bp. Dr Rosenberg Self aware  0930:  Pt resting  0945:  Pt resting  1000:  Pt resting  1015:  Pt resting  1030:  UF off; Pt resting  1045:  Pt resting  1100:  Pt resting  1111: Tx ended. VSS. Each dialysis catheter limb disinfected per p&p, all possible blood returned per p&p, and each dialysis hub disinfected per p&p. Each lumen flushed, post dialysis catheter Heparin dwell instilled per order, and caps applied. Bed locked and in the lowest position, call bell and belongings in reach. SBAR given to Primary, RN. Patient is stable at time of their/ my departure. All Dialysis related medications have been reviewed. Comments:   Assessment performed by RN. Procedure and documentation observed and reviewed by Laila Urena RN.

## 2022-09-12 NOTE — DISCHARGE INSTRUCTIONS
DISCHARGE DIAGNOSIS:  acute Hypoxic Respiratory Failure  2/2 to bilateral pleural effusions  Arden on ckd 4  Urinary retention  DM   HTN- Normocytic Anemia      MEDICATIONS:  It is important that you take the medication exactly as they are prescribed. Keep your medication in the bottles provided by the pharmacist and keep a list of the medication names, dosages, and times to be taken in your wallet. Do not take other medications without consulting your doctor. Pain Management: per above medications    What to do at Home    Recommended diet:  Renal Diet    Recommended activity: Activity as tolerated  Need outpatient fu with urology     If you have questions regarding the hospital related prescriptions or hospital related issues please call [employer] [facility] at . You can always direct your questions to your primary care doctor if you are unable to reach your hospital physician; your PCP works as an extension of your hospital doctor just like your hospital doctor is an extension of your PCP for your time at the hospital [facility]).     If you experience any of the following symptoms then please call your primary care physician or return to the emergency room if you cannot get hold of your doctor:  Fever, chills, nausea, vomiting, diarrhea, change in mentation, falling, bleeding, shortness of breath, ***

## 2022-09-12 NOTE — PROGRESS NOTES
Nephrology Progress Note  [facility] / [address], [place]  [address], [address]  Phone - [phone]  Fax - [phone]                 Patient: Brady Morales                   YOB: 1980        Date- 9/12/2022                      Admit Date: 9/6/2022  CC: Follow up for  new onset ESRD      IMPRESSION & PLAN:   NEW ONSET ESRD  CKD stage IV with progression to ESRD  Volume overload  hyponatremia  Acute hypoxic respiratory failure  Urinary retention status post Hansen  Type 1 diabetes  Hypertension  Anemia  H/O URINARY Retention- hansen placed in aug 2022  Pleural effusion    PLAN-  SEEN ON HD  S/p PC placement by IR  Continue Epogen for anemia  [facility]  Stop clonidine  Hold hydralazine due to low bp  Okay to d/c - if out pt hd set up is done   Subjective: Interval History:   Seen on hd  Bp on low side  Na low- stable  No sob    Objective:   Vitals:    09/12/22 0800 09/12/22 0815 09/12/22 0830 09/12/22 0845   BP: 130/85 117/72 98/67 (!) 102/55   Pulse: 80 79 79 79   Resp: 18 18 18 18   Temp:       TempSrc:       SpO2:       Weight:       Height:          09/11 0701 - 09/12 0700  In: 900 [P.O.:900]  Out: 1800 [Urine:1800]  Last 3 Recorded Weights in this Encounter    09/06/22 0842   Weight: 81.6 kg (180 lb)      Physical exam:  GEN:  NAD  NECK:  Supple, no thyromegaly  RESP: Clear  b/l, no  wheezing,   CVS: RRR,S1,S2   NEURO: non focal, normal speech  EXT: Edema +nt    Hansen +        Chart reviewed. Pertinent Notes reviewed.      Data Review :  Recent Labs     09/12/22  0312 09/11/22  0306 09/10/22  0256   *  132* 131* 133*   K 4.0  3.9 3.4* 4.0   CL 95*  95* 96* 95*   CO2 29  29 29 24   BUN 45*  45* 36* 26*   CREA 4.89*  4.94* 4.05* 2.97*   *  200* 256* 353*   CA 9.5  9.7 8.9 9.4   PHOS 5.4* 3.7 3.5       Recent Labs     09/12/22  0312 09/11/22  0305 09/10/22  0256   WBC 7.1 8.5 9.9   HGB 10.5* 8.9* 9.0*   HCT 33.0* 26.8* 28.1*    276 261       No results for input(s): FE, TIBC, PSAT, FERR in the last 72 hours.    Medication list  reviewed  Current Facility-Administered Medications   Medication Dose Route Frequency    oxyCODONE-acetaminophen (PERCOCET) 5-325 mg per tablet 2 Tablet  2 Tablet Oral Q4H PRN    insulin glargine (LANTUS) injection 25 Units  25 Units SubCUTAneous QHS    sodium chloride (OCEAN) 0.65 % nasal squeeze bottle 2 Spray  2 Spray Both Nostrils Q2H PRN    insulin lispro (HUMALOG) injection   SubCUTAneous AC&HS    heparin (porcine) 1,000 unit/mL injection 2,600 Units  2,600 Units Hemodialysis DIALYSIS PRN    amLODIPine (NORVASC) tablet 10 mg  10 mg Oral DAILY    carvediloL (COREG) tablet 12.5 mg  12.5 mg Oral BID WITH MEALS    rosuvastatin (CRESTOR) tablet 10 mg  10 mg Oral QHS    tamsulosin (FLOMAX) capsule 0.4 mg  0.4 mg Oral DAILY    hydrALAZINE (APRESOLINE) tablet 100 mg  100 mg Oral TID    pregabalin (LYRICA) capsule 75 mg  75 mg Oral BID    ondansetron (ZOFRAN) injection 4 mg  4 mg IntraVENous Q4H PRN    cloNIDine (CATAPRES) 0.1 mg/24 hr patch 1 Patch  1 Patch TransDERmal Q7D    pantoprazole (PROTONIX) tablet 40 mg  40 mg Oral ACB    [Held by provider] heparin (porcine) injection 5,000 Units  5,000 Units SubCUTAneous Q12H    glucose chewable tablet 16 g  4 Tablet Oral PRN    glucagon (GLUCAGEN) injection 1 mg  1 mg IntraMUSCular PRN    dextrose 10% infusion 0-250 mL  0-250 mL IntraVENous PRN    epoetin rell-epbx (RETACRIT) 12,000 Units combo injection  12,000 Units SubCUTAneous Q TUE, THU & SAT    bumetanide (BUMEX) injection 2 mg  2 mg IntraVENous Q8H          Gaetano Gregorio MD  9/12/2022

## 2022-09-12 NOTE — PROGRESS NOTES
Hospital follow-up PCP transitional care appointment has been scheduled with Dr. Kathleen Delgado. Mckinley on 9/13/22 at 1415. Pending patient discharge.   Ricardo Marie, Care Management Assistant

## 2022-09-12 NOTE — PROGRESS NOTES
Transition of Care Plan:    RUR: 33% high  Disposition: home with new HD set up with [facility] MWF - first day at 9 am for admission  Follow up appointments:PCP/Specialist   DME needed: none  Transportation at Discharge: mother  Buzz Phillip or means to access home:   mother     IM Medicare Letter:N/A  Is patient a [place] and connected with the [place]? N/A             If yes, was [place] transfer form completed and [facility] notified? Caregiver Contact: Esdras Espinoza    [phone]  Discharge Caregiver contacted prior to discharge? Yes will contact once discharge order confirmed  Care Conference needed?:   no    CM sent referral on 9/9/2022 via Care port to [address] [place] [address] to secure OP HD- spoke with Ashwini Pedraza at [phone] fax# [phone] - confirmed [facility] can accept patient for new HD  - schedule will be MWF at 10 am chair time with initial day on Weds 9/14 at 9 am for admission paperwork. Mother to provide transport home and to dialysis center. KAVEH Piña CM set up Urology Appt with Cassie Aparicio on Oct 6th at 1:45 pm - info added to [address], [place]

## 2022-09-12 NOTE — PROGRESS NOTES
Hospitalist Progress Note    NAME: Bernard Duran   :  1980   MRN:  [id number]       Assessment / Plan:  acute Hypoxic Respiratory Failure  2/2 to bilateral pleural effusions  Arden on ckd 4  Started on hemodialysis on   CT chest with moderate bilateral effusions L>R  On 3L O2, keep supplemental O2 for sats >90%  Continue with IV Lasix, he is oliguric  Troponin wnl, EKG without acute st segment changes  Pro BNP 5,688  ECHO with EF 44-73%, normal diastolic function (3/61/47)  No indication to repeat ECHO at this time  No improvement despite IV Lasix, nephrology decided to start hemodialysis via [facility] Sis his first HD treatment ton , got his second treatment today  Is to place a permacath tomorrow and plan for outpatient hemodialysis set up  We will repeat chest x-ray, if persistent pleural effusion will consider thoracocentesis  N.p.o. after midnight  : Patient underwent left-sided thoracocentesis which removed about 800 cc of fluid, will follow-up pleural fluid studies  Had a permacath placement  09/10: Patient is complaining of nausea and vomiting , complain of reflux  Continue PPI  :  pt is complaining of left flank pain which is worse upon deep inspiration  Will check chest x-ray    Urinary retention  Beard in place from last admission, failed voiding trial, Beard replaced  Cont. tamsulosin    DM Aytonp20 units SSI  HTN- cont. meds  Normocytic Anemia At baseline Hgb monitor  Correct Electrolytes as needed  PT/OT   CM/SW    Code Status: full  Surrogate Decision Maker: Cheo Elaine  [phone]    DVT Prophylaxis: heparin  GI Prophylaxis: not indicated    Baseline: home IADL's    25.0 - 29.9 Overweight / Body mass index is 26.58 kg/m².     Estimated discharge date:   Barriers:    Code status: Full  Prophylaxis: Hep SQ  Recommended Disposition: Home w/Family     Subjective:     Chief Complaint / Reason for Physician Visit  Follow-up acute respiratory failure with hypoxia, worsening CKD. C/o of left flank pain upon DC inspiration   discussed with RN events overnight. Review of Systems:  Symptom Y/N Comments  Symptom Y/N Comments   Fever/Chills n   Chest Pain n    Poor Appetite    Edema     Cough n   Abdominal Pain n    Sputum    Joint Pain     SOB/JOHNSTON y   Pruritis/Rash     Nausea/vomit Y/y    Tolerating PT/OT     Diarrhea n   Tolerating Diet n    Constipation    Other       Could NOT obtain due to:      Objective:     VITALS:   Last 24hrs VS reviewed since prior progress note. Most recent are:  Patient Vitals for the past 24 hrs:   Temp Pulse Resp BP SpO2   09/12/22 0845 -- 79 18 (!) 102/55 --   09/12/22 0830 -- 79 18 98/67 --   09/12/22 0815 -- 79 18 117/72 --   09/12/22 0800 -- 80 18 130/85 --   09/12/22 0745 -- 79 18 131/81 --   09/12/22 0741 97.6 °F (36.4 °C) 81 18 (!) 140/84 --   09/12/22 0538 98.2 °F (36.8 °C) 85 18 132/68 94 %   09/12/22 0115 97.9 °F (36.6 °C) 78 18 (!) 155/80 93 %   09/11/22 1945 98 °F (36.7 °C) 79 18 (!) 169/83 94 %   09/11/22 1517 97.8 °F (36.6 °C) 77 18 (!) 153/82 93 %   09/11/22 1151 97.6 °F (36.4 °C) 79 18 (!) 156/73 94 %   09/11/22 0904 97.7 °F (36.5 °C) 78 18 (!) 159/90 92 %         Intake/Output Summary (Last 24 hours) at 9/12/2022 0857  Last data filed at 9/11/2022 1800  Gross per 24 hour   Intake 900 ml   Output 1800 ml   Net -900 ml          I had a face to face encounter and independently examined this patient on 9/12/2022, as outlined below:  PHYSICAL EXAM:  General: WD, WN. Alert, cooperative, no acute distress    EENT:  EOMI. Anicteric sclerae. MMM  Resp:  CTA bilaterally, no wheezing or rales. No accessory muscle use  CV:  Regular  rhythm,  No edema  GI:  Soft, Non distended, Non tender. +Bowel sounds  Neurologic:  Alert and oriented X 3, normal speech,   Psych:   Good insight. Not anxious nor agitated  Skin:  No rashes.   No jaundice    Reviewed most current lab test results and cultures  YES  Reviewed most current radiology test results   YES  Review and summation of old records today    NO  Reviewed patient's current orders and MAR    YES  PMH/SH reviewed - no change compared to H&P  ________________________________________________________________________  Care Plan discussed with:    Comments   Patient x    Family      RN x    Care Manager     Consultant                        Multidiciplinary team rounds were held today with , nursing, pharmacist and clinical coordinator. Patient's plan of care was discussed; medications were reviewed and discharge planning was addressed. ________________________________________________________________________  Total NON critical care TIME:  35   Minutes    Total CRITICAL CARE TIME Spent:   Minutes non procedure based      Comments   >50% of visit spent in counseling and coordination of care     ________________________________________________________________________  Eva Plata MD     Procedures: see electronic medical records for all procedures/Xrays and details which were not copied into this note but were reviewed prior to creation of Plan. LABS:  I reviewed today's most current labs and imaging studies.   Pertinent labs include:  Recent Labs     09/12/22  0312 09/11/22  0306 09/10/22  0256   WBC 7.1 8.5 9.9   HGB 10.5* 8.9* 9.0*   HCT 33.0* 26.8* 28.1*    276 261       Recent Labs     09/12/22 0312 09/11/22  0306 09/10/22  1852 09/10/22  0256   *  132* 131*  --  133*   K 4.0  3.9 3.4*  --  4.0   CL 95*  95* 96*  --  95*   CO2 29  29 29  --  24   *  200* 256*  --  353*   BUN 45*  45* 36*  --  26*   CREA 4.89*  4.94* 4.05*  --  2.97*   CA 9.5  9.7 8.9  --  9.4   PHOS 5.4* 3.7  --  3.5   ALB 2.5* 2.2* 2.5* 2.4*   TBILI  --   --  0.3  --    ALT  --   --  25  --          Signed: Eva Plata MD

## 2022-09-13 ENCOUNTER — OFFICE VISIT (OUTPATIENT)
Dept: PRIMARY CARE CLINIC | Age: 42
End: 2022-09-13
Payer: MEDICAID

## 2022-09-13 ENCOUNTER — PATIENT OUTREACH (OUTPATIENT)
Dept: CASE MANAGEMENT | Age: 42
End: 2022-09-13

## 2022-09-13 VITALS
TEMPERATURE: 97.1 F | DIASTOLIC BLOOD PRESSURE: 67 MMHG | RESPIRATION RATE: 18 BRPM | BODY MASS INDEX: 23.55 KG/M2 | HEART RATE: 87 BPM | SYSTOLIC BLOOD PRESSURE: 109 MMHG | OXYGEN SATURATION: 96 % | WEIGHT: 159 LBS | HEIGHT: 69 IN

## 2022-09-13 DIAGNOSIS — M54.50 CHRONIC MIDLINE LOW BACK PAIN WITHOUT SCIATICA: ICD-10-CM

## 2022-09-13 DIAGNOSIS — G89.29 CHRONIC MIDLINE LOW BACK PAIN WITHOUT SCIATICA: ICD-10-CM

## 2022-09-13 DIAGNOSIS — N18.6 ESRD (END STAGE RENAL DISEASE) (HCC): ICD-10-CM

## 2022-09-13 DIAGNOSIS — E78.00 HYPERCHOLESTEREMIA: ICD-10-CM

## 2022-09-13 DIAGNOSIS — E11.9 DM TYPE 2, GOAL HBA1C < 7% (HCC): Primary | ICD-10-CM

## 2022-09-13 DIAGNOSIS — R29.6 RECURRENT FALLS: ICD-10-CM

## 2022-09-13 DIAGNOSIS — I10 PRIMARY HYPERTENSION: ICD-10-CM

## 2022-09-13 LAB
BACTERIA SPEC CULT: NORMAL
GRAM STN SPEC: NORMAL
GRAM STN SPEC: NORMAL
SERVICE CMNT-IMP: NORMAL

## 2022-09-13 PROCEDURE — 99496 TRANSJ CARE MGMT HIGH F2F 7D: CPT | Performed by: INTERNAL MEDICINE

## 2022-09-13 RX ORDER — ACETAMINOPHEN AND CODEINE PHOSPHATE 300; 30 MG/1; MG/1
1 TABLET ORAL
Qty: 15 TABLET | Refills: 0 | Status: SHIPPED | OUTPATIENT
Start: 2022-09-13 | End: 2022-09-28

## 2022-09-13 RX ORDER — NALOXONE HYDROCHLORIDE 4 MG/.1ML
SPRAY NASAL
Qty: 1 EACH | Refills: 0 | Status: SHIPPED | OUTPATIENT
Start: 2022-09-13

## 2022-09-13 NOTE — PROGRESS NOTES
Yas Aguilar is a 46 y.o.  male and presents with     Chief Complaint   Patient presents with    [address] 9/6/2022      Transition of care    Admit date: 9/6/2022  Discharge date and time: 9/18/2022  Reason for admission- SOB  Discharge diagnosis -  pleural effsuion, ESRD    Pt was  on his way to Urology to get his hansen cathter removed but became SOB. He ended up in the hospital - was found to have bilateral pleural effusion- had dialysis catheter placed in the chest wall  Is supposed to get HD three times weekly. Pt has back pain and was getting pain meds in the hospital.  Pt has apt with Nephrology and Urology  Pt had seen DR Neyda Ritchie , ? Ortho who id MRI of his back. Pt sees Dr Bruna Jyoce for DM  Is currently on Lyrica 75 bid. Past Medical History:   Diagnosis Date    Bipolar 1 disorder, depressed ([address]     Bipolar disorder ([address]     Chronic kidney disease, stage 3a ([address]     Depression     Diabetes ([address]     DKA, type 1 ([address] 1/27/2013    diagnosed age 23    DKA, type 1 ([address]     H/O noncompliance with medical treatment, presenting hazards to health     MRSA (methicillin resistant staph aureus) culture positive     MRSA (methicillin resistant Staphylococcus aureus)     Face    Noncompliance with medication regimen     Second hand smoke exposure     Seizure ([address]     Seizures ([address] 2006 or 2007    one episode during prison     Past Surgical History:   Procedure Laterality Date    HX HEENT      top left wisdom tooth    HX ORTHOPAEDIC Left     wrist; MCV    IR INSERT NON TUNL CVC OVER 5 YRS  9/7/2022    IR INSERT TUNL CVC W/O PORT OVER 5 YR  9/9/2022    UPPER GI ENDOSCOPY,BIOPSY  11/20/2018          Current Outpatient Medications   Medication Sig    naloxone (Narcan) 4 mg/actuation nasal spray Use 1 spray intranasally, then discard. Repeat with new spray every 2 min as needed for opioid overdose symptoms, alternating nostrils.     rosuvastatin (CRESTOR) 10 mg tablet Take 1 Tablet by mouth nightly for 60 days. bacitracin zinc (BACITRACIN) ointment Apply  to affected area two (2) times a day. Ketone Blood Test strp 50 Each by Does Not Apply route as needed for PRN Reason (Other) (as needed for suspected dka). pantoprazole (PROTONIX) 40 mg tablet Take 1 Tablet by mouth Daily (before breakfast). finasteride (PROSCAR) 5 mg tablet Take 1 Tablet by mouth in the morning. insulin aspart U-100 (NovoLOG U-100 Insulin aspart) 100 unit/mL injection Use as instructed via insulin pump. Dx code E10.65 max daily dose 50U    pregabalin (Lyrica) 75 mg capsule Take 1 Capsule by mouth two (2) times a day. Max Daily Amount: 150 mg. Walker (Ultra-Light Rollator) misc Use while ambulating    amLODIPine (NORVASC) 10 mg tablet Take 1 Tablet by mouth daily. cloNIDine (CATAPRES) 0.1 mg/24 hr ptwk 1 Patch by TransDERmal route every seven (7) days. Dexcom G6  misc Use with the dexcom device to check blood sugars 4 times a day    Dexcom G6 Sensor brandi Use as directed every 10 days    Dexcom G6 Transmitter brandi Use as directed    Insulin Needles, Disposable, 31 gauge x 5/16\" ndle Dx code E11.65, use 6x daily    carvediloL (COREG) 12.5 mg tablet Take 1 Tablet by mouth two (2) times daily (with meals). tamsulosin (FLOMAX) 0.4 mg capsule Take 0.4 mg by mouth daily. No current facility-administered medications for this visit.      Health Maintenance   Topic Date Due    Foot Exam Q1  03/07/2021    COVID-19 Vaccine (3 - Booster for Moderna series) 03/13/2022    MICROALBUMIN Q1  04/01/2022    Flu Vaccine (1) Never done    Depression Screen  02/24/2023    Lipid Screen  04/01/2023    A1C test (Diabetic or Prediabetic)  09/26/2023    Eye Exam Retinal or Dilated  08/01/2024    DTaP/Tdap/Td series (2 - Td or Tdap) 03/09/2028    Hepatitis C Screening  Completed    Pneumococcal 0-64 years  Completed     Immunization History   Administered Date(s) Administered    (RETIRED) Pneumococcal Vaccine (Unspecified Type) 08/18/2011    COVID-19, MODERNA BLUE border, Primary or Immunocompromised, (age 18y+), IM, 100 mcg/0.5mL 09/11/2021, 10/13/2021    MMR 03/23/1995    Pneumococcal Polysaccharide (PPSV-23) 08/18/2011    Rabies Vaccine IM 09/17/1992    TD Vaccine 03/08/2011    Tdap 03/09/2018     No LMP for male patient. Allergies and Intolerances:   No Known Allergies    Family History:   Family History   Problem Relation Age of Onset    Diabetes Mother     Diabetes Other         neice, type 1        Social History:   He  reports that he quit smoking about 2 years ago. His smoking use included cigarettes. He started smoking about 23 years ago. He has a 1.60 pack-year smoking history. He has never used smokeless tobacco.  He  reports no history of alcohol use.             Review of Systems:   General: negative for - chills, fatigue, fever, weight change  Psych: negative for - anxiety, depression, irritability or mood swings  ENT: negative for - headaches, hearing change, nasal congestion, oral lesions, sneezing or sore throat  Heme/ Lymph: negative for - bleeding problems, bruising, pallor or swollen lymph nodes  Endo: negative for - hot flashes, polydipsia/polyuria or temperature intolerance  Resp: negative for - cough, shortness of breath or wheezing  CV: negative for - chest pain, edema or palpitations  GI: negative for - abdominal pain, change in bowel habits, constipation, diarrhea or nausea/vomiting  : negative for - dysuria, hematuria, incontinence, pelvic pain or vulvar/vaginal symptoms  MSK: negative for - joint pain, joint swelling or muscle pain  Neuro: negative for - confusion, headaches, seizures or weakness  Derm: negative for - dry skin, hair changes, rash or skin lesion changes          Physical:   Vitals:   Vitals:    09/13/22 1431   BP: 109/67   Pulse: 87   Resp: 18   Temp: 97.1 °F (36.2 °C)   TempSrc: Temporal   SpO2: 96%   Weight: 159 lb (72.1 kg)   Height: 5' 9\" (1.753 m) Exam:   HEENT- atraumatic,normocephalic, awake, oriented, well nourished  Neck - supple,no enlarged lymph nodes, no JVD, no thyromegaly  Chest- CTA, no rhonchi, no crackles, dialysis catheter chest wall  Heart- rrr, no murmurs / gallop/rub  Abdomen- soft,BS+,NT, no hepatosplenomegaly  Ext - no c/c/edema   Neuro- no focal deficits. Power 5/5 all extremities  Skin - warm,dry, no obvious rashes. Review of Data:   LABS:   Lab Results   Component Value Date/Time    WBC 8.4 09/27/2022 02:48 AM    HGB 9.4 (L) 09/27/2022 02:48 AM    HCT 30.1 (L) 09/27/2022 02:48 AM    PLATELET 692 24/93/2555 02:48 AM     Lab Results   Component Value Date/Time    Sodium 132 (L) 09/28/2022 03:15 AM    Potassium 4.3 09/28/2022 03:15 AM    Chloride 100 09/28/2022 03:15 AM    CO2 22 09/28/2022 03:15 AM    Glucose 167 (H) 09/28/2022 03:15 AM    BUN 31 (H) 09/28/2022 03:15 AM    Creatinine 3.51 (H) 09/28/2022 03:15 AM     Lab Results   Component Value Date/Time    Cholesterol, total 192 11/15/2021 12:01 PM    HDL Cholesterol 61 11/15/2021 12:01 PM    LDL, calculated 89.4 11/15/2021 12:01 PM    Triglyceride 208 (H) 11/15/2021 12:01 PM     No components found for: GPT        Impression / Plan:        ICD-10-CM ICD-9-CM    1. DM type 2, goal HbA1c < 7% (HCC)  E11.9 250.00       2. Chronic midline low back pain without sciatica  M54.50 724.2 REFERRAL TO PAIN MANAGEMENT    G89.29 338.29 naloxone (Narcan) 4 mg/actuation nasal spray      DISCONTINUED: acetaminophen-codeine (Tylenol-Codeine #3) 300-30 mg per tablet      3. Hypercholesteremia  E78.00 272.0       4. Recurrent falls  R29.6 V15.88       5. Primary hypertension  I10 401.9       6. ESRD (end stage renal disease) ([facility]  N18.6 585.6         PLeural effusion , fluid overload secondary to ESRD - now on dialysis. Follow up with Nephrology. Explained to patient risk benefits of the medications. Advised patient to stop meds if having any side effects. Pt verbalized understanding of the instructions. I have discussed the diagnosis with the patient and the intended plan as seen in the above orders. The patient has received an after-visit summary and questions were answered concerning future plans. I have discussed medication side effects and warnings with the patient as well. I have reviewed the plan of care with the patient, accepted their input and they are in agreement with the treatment goals. Reviewed plan of care. Patient has provided input and agrees with goals. Follow-up and Dispositions    Return in about 1 month (around 10/13/2022).          Josemanuel Jackson MD

## 2022-09-13 NOTE — PROGRESS NOTES
Care Transitions Initial Call    Call within 2 business days of discharge: Yes     Patient: Rae Elena Patient : 1980 MRN: [id number]    Last Discharge [address]       Date Complaint Diagnosis Description Type Department Provider    22 Shortness of Breath Pleural effusion . .. ED to Hosp-Admission (Discharged) (ADMIT) Philip Bass MD; Cl Yanes, Humphrey... Was this an external facility discharge? No Discharge Facility: [facility]    Challenges to be reviewed by the provider   Additional needs identified to be addressed with provider: yes  home health care- [facility] sent to [place]ina patient  medications- sodium bicarb tablets   Patient has catheter in chest for dialysis, [facility] in [place] M,W, F. Also has hansen cath for urinary retention and has an appt with u urology on 10/6. Ctn has call out for earlier appt. Method of communication with provider : chart routing    Discussed [id number] related testing which was not done at this time. Test results were not done. Patient informed of results, if available? Not done       Inpatient Readmission Risk score: Unplanned Readmit Risk Score: 33.1    Was this a readmission?  yes   Patient stated reason for the admission: chest pain and sob    Patients top risk factors for readmission: functional physical ability, level of motivation, medical condition-diabetes, dialysis, medication management, polypharmacy, and support system   Interventions to address risk factors: Scheduled appointment with PCP-22, Scheduled appointment with Specialist-10/6/22 [facility], 10/28/22 [facility]ology[facility] to , Obtained and reviewed discharge summary and/or continuity of care documents, and Communication with specialists who will assume or re-assume care of the patient's system-specific problems-[facility] urology called for earlier appt than 10/6/22 for hansen     Care Transition Nurse (CTN) contacted the patient by telephone to perform post hospital discharge assessment. Verified name and  with patient as identifiers. Provided introduction to self, and explanation of the CTN role. CTN reviewed discharge instructions, medical action plan and red flags with patient who verbalized understanding. Were discharge instructions available to patient? yes. Reviewed appropriate site of care based on symptoms and resources available to patient including: PCP, Specialist, and Home Health. Patient given an opportunity to ask questions and does not have any further questions or concerns at this time. The patient agrees to contact the PCP office for questions related to their healthcare. Medication reconciliation was performed with patient, who verbalizes understanding of administration of home medications. Advised obtaining a 90-day supply of all daily and as-needed medications. Referral to Pharm D needed: no     Home Health/Outpatient orders at discharge: PT, OT, and [place] [facility]: [address] sent  Date of initial visit: resumption of care    Durable Medical Equipment ordered at discharge: None      Was patient discharged with a pulse oximeter? no    Discussed follow-up appointments. If no appointment was previously scheduled, appointment scheduling offered: yes. Is follow up appointment scheduled within 7 days of discharge? yes. [facility] follow up appointment(s):   Future Appointments   Date Time Provider Caryl Foley   2022  2:15 PM Jey Ramsey MD [facility] BS AMB   2022  9:15 AM Jey Ramsey MD [facility] AMB   10/28/2022 10:30 AM Melany Romero MD [facility] BS AMB     [facility] follow up appointment(s): [facility] urology on 10/6/22, ctn called to get sooner appt time for hansen cath    Plan for follow-up call in 5-7 days based on severity of symptoms and risk factors. Plan for next call: follow up appointment-pcp on 22, home health?, blood sugar monitoring and dialysis toleration  CTN provided contact information for future needs.      Goals Addressed                   This Visit's Progress     Prevent complications post hospitalization. Not on track     8/23/22    Patient to attend pcp appt on 8/25/22  Patient to use dexacom and pump to control blood sugars and check bs 5 times daily with meals, fasting in the am and before bed. Ctn contacted mom's meals and restarted meal delivery for patient. Ctn gave patient number for senior connections rides and lets go service for transportation needs. [facility] sent to follow patient and soc on 8/23/22  Patient with hansen and instructed wash daily with soap and water, empty bag daily and monitor urine color and clarity  Patient to add sodium bicarb tablets and take as ordered. Ctn to follow up in one week. Janine Diallo RN    9/13/22    Patient to attend pcp appt on 9/13/22  Patient to use dexacom and pump to control blood sugars and check bs 5 times daily with meals, fasting in the am and before bed. [facility] sent to follow patient and georgina  Patient to monitor dialysis catheter for signs of infection including redness, swelling, drainage, fever  Patient with hansen and instructed wash daily with soap and water, empty bag daily and monitor urine color and clarity  Patient to add sodium bicarb tablets and take as ordered. Ctn to follow up in one week.   Janine Diallo RN

## 2022-09-13 NOTE — PROGRESS NOTES
Chief Complaint   Patient presents with    Hospital Follow Up        Visit Vitals  /67 (BP 1 Location: Left upper arm, BP Patient Position: Sitting)   Pulse 87   Temp 97.1 °F (36.2 °C) (Temporal)   Resp 18   Ht 5' 9\" (1.753 m)   Wt 159 lb (72.1 kg)   SpO2 96%   BMI 23.48 kg/m²        Health Maintenance Due   Topic    Pneumococcal 0-64 years (2 - PCV)    Foot Exam Q1     COVID-19 Vaccine (3 - Booster for Moderna series)    MICROALBUMIN Q1     Flu Vaccine (1)        1. Have you been to the ER, urgent care clinic since your last visit? Hospitalized since your last visit? Yes When:   [facility] 9/6/2022     2. Have you seen or consulted any other health care providers outside of the [address] [place] [address] since your last visit? Include any pap smears or colon screening.  No

## 2022-09-26 ENCOUNTER — APPOINTMENT (OUTPATIENT)
Dept: GENERAL RADIOLOGY | Age: 42
DRG: 420 | End: 2022-09-26
Attending: EMERGENCY MEDICINE
Payer: MEDICAID

## 2022-09-26 ENCOUNTER — HOSPITAL ENCOUNTER (INPATIENT)
Age: 42
LOS: 2 days | Discharge: HOME | DRG: 420 | End: 2022-09-28
Attending: EMERGENCY MEDICINE | Admitting: HOSPITALIST
Payer: MEDICAID

## 2022-09-26 DIAGNOSIS — Z99.2 ESRD ON HEMODIALYSIS (HCC): ICD-10-CM

## 2022-09-26 DIAGNOSIS — N18.6 ESRD ON HEMODIALYSIS (HCC): ICD-10-CM

## 2022-09-26 DIAGNOSIS — J18.9 PNEUMONIA DUE TO INFECTIOUS ORGANISM, UNSPECIFIED LATERALITY, UNSPECIFIED PART OF LUNG: ICD-10-CM

## 2022-09-26 DIAGNOSIS — E10.10 DIABETIC KETOACIDOSIS WITHOUT COMA ASSOCIATED WITH TYPE 1 DIABETES MELLITUS (HCC): Primary | ICD-10-CM

## 2022-09-26 LAB
ALBUMIN SERPL-MCNC: 3 G/DL (ref 3.5–5)
ALBUMIN/GLOB SERPL: 0.8 {RATIO} (ref 1.1–2.2)
ALP SERPL-CCNC: 161 U/L (ref 45–117)
ALT SERPL-CCNC: 29 U/L (ref 12–78)
ANION GAP SERPL CALC-SCNC: 18 MMOL/L (ref 5–15)
ANION GAP SERPL CALC-SCNC: 19 MMOL/L (ref 5–15)
ANION GAP SERPL CALC-SCNC: 5 MMOL/L (ref 5–15)
ANION GAP SERPL CALC-SCNC: 8 MMOL/L (ref 5–15)
AST SERPL-CCNC: 19 U/L (ref 15–37)
B-OH-BUTYR SERPL-SCNC: >4.42 MMOL/L
BASE DEFICIT BLD-SCNC: 11.2 MMOL/L
BASOPHILS # BLD: 0.1 K/UL (ref 0–0.1)
BASOPHILS NFR BLD: 1 % (ref 0–1)
BILIRUB SERPL-MCNC: 0.6 MG/DL (ref 0.2–1)
BUN SERPL-MCNC: 20 MG/DL (ref 6–20)
BUN SERPL-MCNC: 24 MG/DL (ref 6–20)
BUN SERPL-MCNC: 59 MG/DL (ref 6–20)
BUN SERPL-MCNC: 64 MG/DL (ref 6–20)
BUN/CREAT SERPL: 11 (ref 12–20)
BUN/CREAT SERPL: 12 (ref 12–20)
BUN/CREAT SERPL: 14 (ref 12–20)
BUN/CREAT SERPL: 9 (ref 12–20)
CA-I BLD-MCNC: 0.98 MMOL/L (ref 1.12–1.32)
CALCIUM SERPL-MCNC: 8.1 MG/DL (ref 8.5–10.1)
CALCIUM SERPL-MCNC: 8.1 MG/DL (ref 8.5–10.1)
CALCIUM SERPL-MCNC: 8.3 MG/DL (ref 8.5–10.1)
CALCIUM SERPL-MCNC: 8.6 MG/DL (ref 8.5–10.1)
CHLORIDE BLD-SCNC: 94 MMOL/L (ref 100–108)
CHLORIDE SERPL-SCNC: 100 MMOL/L (ref 97–108)
CHLORIDE SERPL-SCNC: 103 MMOL/L (ref 97–108)
CHLORIDE SERPL-SCNC: 88 MMOL/L (ref 97–108)
CHLORIDE SERPL-SCNC: 90 MMOL/L (ref 97–108)
CO2 BLD-SCNC: 16 MMOL/L (ref 19–24)
CO2 SERPL-SCNC: 17 MMOL/L (ref 21–32)
CO2 SERPL-SCNC: 19 MMOL/L (ref 21–32)
CO2 SERPL-SCNC: 25 MMOL/L (ref 21–32)
CO2 SERPL-SCNC: 31 MMOL/L (ref 21–32)
COMMENT, HOLDF: NORMAL
COVID-19 RAPID TEST, COVR: NOT DETECTED
CREAT SERPL-MCNC: 1.77 MG/DL (ref 0.7–1.3)
CREAT SERPL-MCNC: 2.67 MG/DL (ref 0.7–1.3)
CREAT SERPL-MCNC: 4.73 MG/DL (ref 0.7–1.3)
CREAT SERPL-MCNC: 4.73 MG/DL (ref 0.7–1.3)
CREAT UR-MCNC: 4.6 MG/DL (ref 0.6–1.3)
DIFFERENTIAL METHOD BLD: ABNORMAL
EOSINOPHIL # BLD: 0.2 K/UL (ref 0–0.4)
EOSINOPHIL NFR BLD: 2 % (ref 0–7)
ERYTHROCYTE [DISTWIDTH] IN BLOOD BY AUTOMATED COUNT: 15.6 % (ref 11.5–14.5)
EST. AVERAGE GLUCOSE BLD GHB EST-MCNC: 160 MG/DL
GLOBULIN SER CALC-MCNC: 3.7 G/DL (ref 2–4)
GLUCOSE BLD STRIP.AUTO-MCNC: 103 MG/DL (ref 65–117)
GLUCOSE BLD STRIP.AUTO-MCNC: 128 MG/DL (ref 65–117)
GLUCOSE BLD STRIP.AUTO-MCNC: 128 MG/DL (ref 65–117)
GLUCOSE BLD STRIP.AUTO-MCNC: 162 MG/DL (ref 65–117)
GLUCOSE BLD STRIP.AUTO-MCNC: 167 MG/DL (ref 65–117)
GLUCOSE BLD STRIP.AUTO-MCNC: 193 MG/DL (ref 65–117)
GLUCOSE BLD STRIP.AUTO-MCNC: 196 MG/DL (ref 65–117)
GLUCOSE BLD STRIP.AUTO-MCNC: 305 MG/DL (ref 65–117)
GLUCOSE BLD STRIP.AUTO-MCNC: 487 MG/DL (ref 65–117)
GLUCOSE BLD STRIP.AUTO-MCNC: >700 MG/DL (ref 74–106)
GLUCOSE SERPL-MCNC: 108 MG/DL (ref 65–100)
GLUCOSE SERPL-MCNC: 209 MG/DL (ref 65–100)
GLUCOSE SERPL-MCNC: 920 MG/DL (ref 65–100)
GLUCOSE SERPL-MCNC: 946 MG/DL (ref 65–100)
HBA1C MFR BLD: 7.2 % (ref 4–5.6)
HCO3 BLDA-SCNC: 16 MMOL/L
HCT VFR BLD AUTO: 29.2 % (ref 36.6–50.3)
HCT VFR BLD AUTO: 29.3 % (ref 36.6–50.3)
HCT VFR BLD AUTO: 33 % (ref 36.6–50.3)
HGB BLD-MCNC: 10 G/DL (ref 12.1–17)
HGB BLD-MCNC: 9.2 G/DL (ref 12.1–17)
HGB BLD-MCNC: 9.3 G/DL (ref 12.1–17)
IMM GRANULOCYTES # BLD AUTO: 0 K/UL (ref 0–0.04)
IMM GRANULOCYTES NFR BLD AUTO: 0 % (ref 0–0.5)
LACTATE BLD-SCNC: 1.53 MMOL/L (ref 0.4–2)
LIPASE SERPL-CCNC: 110 U/L (ref 73–393)
LYMPHOCYTES # BLD: 0.7 K/UL (ref 0.8–3.5)
LYMPHOCYTES NFR BLD: 7 % (ref 12–49)
MAGNESIUM SERPL-MCNC: 2.1 MG/DL (ref 1.6–2.4)
MAGNESIUM SERPL-MCNC: 2.2 MG/DL (ref 1.6–2.4)
MAGNESIUM SERPL-MCNC: 2.5 MG/DL (ref 1.6–2.4)
MAGNESIUM SERPL-MCNC: 2.6 MG/DL (ref 1.6–2.4)
MCH RBC QN AUTO: 26.2 PG (ref 26–34)
MCHC RBC AUTO-ENTMCNC: 30.3 G/DL (ref 30–36.5)
MCV RBC AUTO: 86.4 FL (ref 80–99)
MONOCYTES # BLD: 0.4 K/UL (ref 0–1)
MONOCYTES NFR BLD: 4 % (ref 5–13)
NEUTS SEG # BLD: 7.9 K/UL (ref 1.8–8)
NEUTS SEG NFR BLD: 86 % (ref 32–75)
NRBC # BLD: 0 K/UL (ref 0–0.01)
NRBC BLD-RTO: 0 PER 100 WBC
PCO2 BLDV: 37.2 MMHG (ref 41–51)
PH BLDV: 7.23 [PH] (ref 7.32–7.42)
PHOSPHATE SERPL-MCNC: 6.3 MG/DL (ref 2.6–4.7)
PHOSPHATE SERPL-MCNC: 6.7 MG/DL (ref 2.6–4.7)
PLATELET # BLD AUTO: 282 K/UL (ref 150–400)
PMV BLD AUTO: 10.7 FL (ref 8.9–12.9)
PO2 BLDV: 76 MMHG (ref 25–40)
POTASSIUM BLD-SCNC: 8.9 MMOL/L (ref 3.5–5.5)
POTASSIUM SERPL-SCNC: 3.1 MMOL/L (ref 3.5–5.1)
POTASSIUM SERPL-SCNC: 3.3 MMOL/L (ref 3.5–5.1)
POTASSIUM SERPL-SCNC: 5.2 MMOL/L (ref 3.5–5.1)
POTASSIUM SERPL-SCNC: 5.7 MMOL/L (ref 3.5–5.1)
PROCALCITONIN SERPL-MCNC: 0.94 NG/ML
PROT SERPL-MCNC: 6.7 G/DL (ref 6.4–8.2)
RBC # BLD AUTO: 3.82 M/UL (ref 4.1–5.7)
RBC MORPH BLD: ABNORMAL
RBC MORPH BLD: ABNORMAL
SAMPLES BEING HELD,HOLD: NORMAL
SERVICE CMNT-IMP: ABNORMAL
SERVICE CMNT-IMP: NORMAL
SODIUM BLD-SCNC: 118 MMOL/L (ref 136–145)
SODIUM SERPL-SCNC: 125 MMOL/L (ref 136–145)
SODIUM SERPL-SCNC: 126 MMOL/L (ref 136–145)
SODIUM SERPL-SCNC: 136 MMOL/L (ref 136–145)
SODIUM SERPL-SCNC: 136 MMOL/L (ref 136–145)
SOURCE, COVRS: NORMAL
SPECIMEN SITE: ABNORMAL
TROPONIN-HIGH SENSITIVITY: 11 NG/L (ref 0–76)
WBC # BLD AUTO: 9.3 K/UL (ref 4.1–11.1)

## 2022-09-26 PROCEDURE — 83036 HEMOGLOBIN GLYCOSYLATED A1C: CPT

## 2022-09-26 PROCEDURE — 84484 ASSAY OF TROPONIN QUANT: CPT

## 2022-09-26 PROCEDURE — 82947 ASSAY GLUCOSE BLOOD QUANT: CPT

## 2022-09-26 PROCEDURE — 80053 COMPREHEN METABOLIC PANEL: CPT

## 2022-09-26 PROCEDURE — 74011000250 HC RX REV CODE- 250: Performed by: HOSPITALIST

## 2022-09-26 PROCEDURE — 76937 US GUIDE VASCULAR ACCESS: CPT

## 2022-09-26 PROCEDURE — 74011250636 HC RX REV CODE- 250/636: Performed by: HOSPITALIST

## 2022-09-26 PROCEDURE — 99285 EMERGENCY DEPT VISIT HI MDM: CPT

## 2022-09-26 PROCEDURE — 87635 SARS-COV-2 COVID-19 AMP PRB: CPT

## 2022-09-26 PROCEDURE — 93005 ELECTROCARDIOGRAM TRACING: CPT

## 2022-09-26 PROCEDURE — 85025 COMPLETE CBC W/AUTO DIFF WBC: CPT

## 2022-09-26 PROCEDURE — 96374 THER/PROPH/DIAG INJ IV PUSH: CPT

## 2022-09-26 PROCEDURE — 83735 ASSAY OF MAGNESIUM: CPT

## 2022-09-26 PROCEDURE — 71045 X-RAY EXAM CHEST 1 VIEW: CPT

## 2022-09-26 PROCEDURE — 84145 PROCALCITONIN (PCT): CPT

## 2022-09-26 PROCEDURE — 82010 KETONE BODYS QUAN: CPT

## 2022-09-26 PROCEDURE — 96375 TX/PRO/DX INJ NEW DRUG ADDON: CPT

## 2022-09-26 PROCEDURE — 74011250637 HC RX REV CODE- 250/637: Performed by: HOSPITALIST

## 2022-09-26 PROCEDURE — 83690 ASSAY OF LIPASE: CPT

## 2022-09-26 PROCEDURE — 74011250636 HC RX REV CODE- 250/636: Performed by: EMERGENCY MEDICINE

## 2022-09-26 PROCEDURE — 80048 BASIC METABOLIC PNL TOTAL CA: CPT

## 2022-09-26 PROCEDURE — 74011000258 HC RX REV CODE- 258: Performed by: EMERGENCY MEDICINE

## 2022-09-26 PROCEDURE — 36415 COLL VENOUS BLD VENIPUNCTURE: CPT

## 2022-09-26 PROCEDURE — 65270000046 HC RM TELEMETRY

## 2022-09-26 PROCEDURE — 90935 HEMODIALYSIS ONE EVALUATION: CPT

## 2022-09-26 PROCEDURE — 85018 HEMOGLOBIN: CPT

## 2022-09-26 PROCEDURE — 5A1D70Z PERFORMANCE OF URINARY FILTRATION, INTERMITTENT, LESS THAN 6 HOURS PER DAY: ICD-10-PCS | Performed by: INTERNAL MEDICINE

## 2022-09-26 PROCEDURE — 84100 ASSAY OF PHOSPHORUS: CPT

## 2022-09-26 PROCEDURE — 87040 BLOOD CULTURE FOR BACTERIA: CPT

## 2022-09-26 PROCEDURE — 74011636637 HC RX REV CODE- 636/637: Performed by: EMERGENCY MEDICINE

## 2022-09-26 PROCEDURE — 74011000258 HC RX REV CODE- 258: Performed by: HOSPITALIST

## 2022-09-26 PROCEDURE — 82962 GLUCOSE BLOOD TEST: CPT

## 2022-09-26 PROCEDURE — C9113 INJ PANTOPRAZOLE SODIUM, VIA: HCPCS | Performed by: HOSPITALIST

## 2022-09-26 RX ORDER — METOCLOPRAMIDE HYDROCHLORIDE 5 MG/ML
10 INJECTION INTRAMUSCULAR; INTRAVENOUS EVERY 8 HOURS
Status: DISPENSED | OUTPATIENT
Start: 2022-09-26 | End: 2022-09-27

## 2022-09-26 RX ORDER — ACETAMINOPHEN 325 MG/1
650 TABLET ORAL
Status: DISCONTINUED | OUTPATIENT
Start: 2022-09-26 | End: 2022-09-28 | Stop reason: HOSPADM

## 2022-09-26 RX ORDER — CARVEDILOL 12.5 MG/1
12.5 TABLET ORAL 2 TIMES DAILY WITH MEALS
Status: DISCONTINUED | OUTPATIENT
Start: 2022-09-26 | End: 2022-09-28 | Stop reason: HOSPADM

## 2022-09-26 RX ORDER — ONDANSETRON 2 MG/ML
4 INJECTION INTRAMUSCULAR; INTRAVENOUS
Status: DISCONTINUED | OUTPATIENT
Start: 2022-09-26 | End: 2022-09-28 | Stop reason: HOSPADM

## 2022-09-26 RX ORDER — FINASTERIDE 5 MG/1
5 TABLET, FILM COATED ORAL DAILY
Status: DISCONTINUED | OUTPATIENT
Start: 2022-09-26 | End: 2022-09-28 | Stop reason: HOSPADM

## 2022-09-26 RX ORDER — SODIUM CHLORIDE 0.9 % (FLUSH) 0.9 %
5-40 SYRINGE (ML) INJECTION AS NEEDED
Status: DISCONTINUED | OUTPATIENT
Start: 2022-09-26 | End: 2022-09-28 | Stop reason: HOSPADM

## 2022-09-26 RX ORDER — ROSUVASTATIN CALCIUM 10 MG/1
10 TABLET, COATED ORAL
Status: DISCONTINUED | OUTPATIENT
Start: 2022-09-26 | End: 2022-09-28 | Stop reason: HOSPADM

## 2022-09-26 RX ORDER — ACETAMINOPHEN 650 MG/1
650 SUPPOSITORY RECTAL
Status: DISCONTINUED | OUTPATIENT
Start: 2022-09-26 | End: 2022-09-28 | Stop reason: HOSPADM

## 2022-09-26 RX ORDER — MAGNESIUM SULFATE 100 %
4 CRYSTALS MISCELLANEOUS AS NEEDED
Status: DISCONTINUED | OUTPATIENT
Start: 2022-09-26 | End: 2022-09-27

## 2022-09-26 RX ORDER — ACETAMINOPHEN 325 MG/1
650 TABLET ORAL
Status: DISCONTINUED | OUTPATIENT
Start: 2022-09-26 | End: 2022-09-26 | Stop reason: SDUPTHER

## 2022-09-26 RX ORDER — TAMSULOSIN HYDROCHLORIDE 0.4 MG/1
0.4 CAPSULE ORAL DAILY
Status: DISCONTINUED | OUTPATIENT
Start: 2022-09-26 | End: 2022-09-28 | Stop reason: HOSPADM

## 2022-09-26 RX ORDER — HYDRALAZINE HYDROCHLORIDE 20 MG/ML
10 INJECTION INTRAMUSCULAR; INTRAVENOUS
Status: DISCONTINUED | OUTPATIENT
Start: 2022-09-26 | End: 2022-09-28 | Stop reason: HOSPADM

## 2022-09-26 RX ORDER — HEPARIN SODIUM 5000 [USP'U]/ML
5000 INJECTION, SOLUTION INTRAVENOUS; SUBCUTANEOUS 2 TIMES DAILY
Status: DISCONTINUED | OUTPATIENT
Start: 2022-09-26 | End: 2022-09-28 | Stop reason: HOSPADM

## 2022-09-26 RX ORDER — METOCLOPRAMIDE HYDROCHLORIDE 5 MG/ML
10 INJECTION INTRAMUSCULAR; INTRAVENOUS
Status: COMPLETED | OUTPATIENT
Start: 2022-09-26 | End: 2022-09-26

## 2022-09-26 RX ORDER — DEXTROSE MONOHYDRATE 100 MG/ML
0-250 INJECTION, SOLUTION INTRAVENOUS AS NEEDED
Status: DISCONTINUED | OUTPATIENT
Start: 2022-09-26 | End: 2022-09-27

## 2022-09-26 RX ORDER — INSULIN LISPRO 100 [IU]/ML
INJECTION, SOLUTION INTRAVENOUS; SUBCUTANEOUS
Status: DISCONTINUED | OUTPATIENT
Start: 2022-09-26 | End: 2022-09-27

## 2022-09-26 RX ORDER — POLYETHYLENE GLYCOL 3350 17 G/17G
17 POWDER, FOR SOLUTION ORAL DAILY PRN
Status: DISCONTINUED | OUTPATIENT
Start: 2022-09-26 | End: 2022-09-28 | Stop reason: HOSPADM

## 2022-09-26 RX ORDER — PREGABALIN 75 MG/1
75 CAPSULE ORAL 2 TIMES DAILY
Status: DISCONTINUED | OUTPATIENT
Start: 2022-09-26 | End: 2022-09-28 | Stop reason: HOSPADM

## 2022-09-26 RX ORDER — SODIUM CHLORIDE 0.9 % (FLUSH) 0.9 %
5-40 SYRINGE (ML) INJECTION EVERY 8 HOURS
Status: DISCONTINUED | OUTPATIENT
Start: 2022-09-26 | End: 2022-09-28 | Stop reason: HOSPADM

## 2022-09-26 RX ORDER — ONDANSETRON 4 MG/1
4 TABLET, ORALLY DISINTEGRATING ORAL
Status: DISCONTINUED | OUTPATIENT
Start: 2022-09-26 | End: 2022-09-28 | Stop reason: HOSPADM

## 2022-09-26 RX ADMIN — SODIUM CHLORIDE 500 ML: 9 INJECTION, SOLUTION INTRAVENOUS at 08:16

## 2022-09-26 RX ADMIN — AZITHROMYCIN DIHYDRATE 500 MG: 500 INJECTION, POWDER, LYOPHILIZED, FOR SOLUTION INTRAVENOUS at 09:52

## 2022-09-26 RX ADMIN — METOCLOPRAMIDE 10 MG: 5 INJECTION, SOLUTION INTRAMUSCULAR; INTRAVENOUS at 08:16

## 2022-09-26 RX ADMIN — SODIUM CHLORIDE 10.8 UNITS/HR: 9 INJECTION, SOLUTION INTRAVENOUS at 11:04

## 2022-09-26 RX ADMIN — PREGABALIN 75 MG: 75 CAPSULE ORAL at 18:07

## 2022-09-26 RX ADMIN — AMPICILLIN SODIUM AND SULBACTAM SODIUM 3 G: 2; 1 INJECTION, POWDER, FOR SOLUTION INTRAMUSCULAR; INTRAVENOUS at 23:10

## 2022-09-26 RX ADMIN — CARVEDILOL 12.5 MG: 12.5 TABLET, FILM COATED ORAL at 18:07

## 2022-09-26 RX ADMIN — SODIUM CHLORIDE 1 G: 900 INJECTION INTRAVENOUS at 09:52

## 2022-09-26 RX ADMIN — ACETAMINOPHEN 650 MG: 325 TABLET ORAL at 21:36

## 2022-09-26 RX ADMIN — ROSUVASTATIN CALCIUM 10 MG: 10 TABLET, COATED ORAL at 21:03

## 2022-09-26 RX ADMIN — METOCLOPRAMIDE 10 MG: 5 INJECTION, SOLUTION INTRAMUSCULAR; INTRAVENOUS at 21:03

## 2022-09-26 RX ADMIN — SODIUM CHLORIDE, PRESERVATIVE FREE 10 ML: 5 INJECTION INTRAVENOUS at 21:03

## 2022-09-26 RX ADMIN — ONDANSETRON 4 MG: 2 INJECTION INTRAMUSCULAR; INTRAVENOUS at 19:00

## 2022-09-26 RX ADMIN — SODIUM CHLORIDE 4.9 UNITS/HR: 9 INJECTION, SOLUTION INTRAVENOUS at 13:11

## 2022-09-26 RX ADMIN — SODIUM CHLORIDE 2.7 UNITS/HR: 9 INJECTION, SOLUTION INTRAVENOUS at 14:14

## 2022-09-26 RX ADMIN — SODIUM CHLORIDE, PRESERVATIVE FREE 40 MG: 5 INJECTION INTRAVENOUS at 21:02

## 2022-09-26 NOTE — PROGRESS NOTES
Nephrology Progress Note  [facility] / [address], [address]  [address] [facility] [address], [address]  Phone - [phone]  Fax - [phone]                 Patient: Andrew Robledo                   YOB: 1980        Date- 9/26/2022                      Admit Date: 9/26/2022  CC: Follow up for ESRD          IMPRESSION & PLAN:   [facility], right chest permacath, under Dr. Bri Rodriguez  DKA  Hyperkalemia  Anion gap acidosis  Non-anion gap metabolic acidosis  Type 1 diabetes uncontrolled  Hypertension  Anemia    PLAN-  Plan to dialyze urgently in the ED. We will not do any ultrafiltration on HD as patient is volume depleted  Already on IV insulin per DKA protocol. Hemoglobin stable no need for Epogen  Will check need for HD again tomorrow  Acidosis should improve with IV insulin and also with dialysis. Discussed with Dr. Sharee Gomez. Subjective: Interval History:   -Patient was seen and examined in the ED. He is well-known to me from his previous hospital admission. He is a new ESRD and was started on hemodialysis from his previous admission. [facility]. His last dialysis was on Friday. He started to feel worse overnight as his infusion set for insulin  stopped working. He became hyperglycemic and started exhibiting nausea and vomiting. He was brought to the ED and was found to be in DKA. He was becoming hyperkalemic so urgent renal consult for evaluation of hemodialysis needs. Objective:   Vitals:    09/26/22 1315 09/26/22 1330 09/26/22 1345 09/26/22 1400   BP: (!) 149/84 (!) 151/82 (!) 170/89 (!) 162/85   Pulse: 96 96 98 99   Resp:       Temp:       TempSrc:       SpO2:       Weight:       Height:          No intake/output data recorded.   Last 3 Recorded Weights in this Encounter    09/26/22 0830 09/26/22 0830   Weight: 68 kg (150 lb) 68 kg (150 lb)        Physical exam:    GEN: No apparent distress  NECK- Supple, no mass  RESP: No wheezing, Clear b/l  CVS: Tachycardic  NEURO: Normal speech, Non focal  EXT: No Edema   HD access: Right chest permacath    Chart reviewed. Pertinent Notes reviewed. Data Review :  Recent Labs     09/26/22  0934 09/26/22  0805   * 125*   K 5.7* 5.2*   CL 90* 88*   CO2 17* 19*   BUN 64* 59*   CREA 4.73* 4.73*   * 920*   CA 8.1* 8.6   MG 2.5* 2.6*   PHOS 6.7* 6.3*     Recent Labs     09/26/22  0805   WBC 9.3   HGB 10.0*   HCT 33.0*        No results for input(s): FE, TIBC, PSAT, FERR in the last 72 hours.    Lab Results   Component Value Date/Time    Hemoglobin A1c 8.7 (H) 08/12/2022 12:41 AM    Hemoglobin A1c 12.1 (H) 07/10/2022 07:51 PM    Hemoglobin A1c 12.7 (H) 06/08/2022 04:53 AM      Lab Results   Component Value Date/Time    Microalbumin/Creat ratio (mg/g creat) 11,439 (H) 04/01/2021 10:00 AM    Microalbumin,urine random 151.00 04/01/2021 10:00 AM     Lab Results   Component Value Date/Time    NT pro-BNP 5,688 (H) 09/06/2022 09:16 AM    NT pro-BNP 11,477 (H) 08/16/2022 12:50 AM    NT pro-BNP 4,600 (H) 08/09/2022 02:13 PM    NT pro- (H) 07/06/2021 09:52 AM     US Results (most recent):  Medication list  reviewed  Current Facility-Administered Medications   Medication Dose Route Frequency    insulin lispro (HUMALOG) injection   SubCUTAneous TIDAC    glucose chewable tablet 16 g  4 Tablet Oral PRN    glucagon (GLUCAGEN) injection 1 mg  1 mg IntraMUSCular PRN    dextrose 10% infusion 0-250 mL  0-250 mL IntraVENous PRN    insulin regular (NOVOLIN R, HUMULIN R) 100 Units in 0.9% sodium chloride 100 mL infusion  0-50 Units/hr IntraVENous TITRATE    sodium chloride (NS) flush 5-40 mL  5-40 mL IntraVENous Q8H    sodium chloride (NS) flush 5-40 mL  5-40 mL IntraVENous PRN    acetaminophen (TYLENOL) tablet 650 mg  650 mg Oral Q6H PRN    Or acetaminophen (TYLENOL) suppository 650 mg  650 mg Rectal Q6H PRN    polyethylene glycol (MIRALAX) packet 17 g  17 g Oral DAILY PRN    ondansetron (ZOFRAN ODT) tablet 4 mg  4 mg Oral Q8H PRN    Or    ondansetron (ZOFRAN) injection 4 mg  4 mg IntraVENous Q6H PRN    [Held by provider] heparin (porcine) injection 5,000 Units  5,000 Units SubCUTAneous BID    pantoprazole (PROTONIX) 40 mg in 0.9% sodium chloride 10 mL injection  40 mg IntraVENous Q12H    carvediloL (COREG) tablet 12.5 mg  12.5 mg Oral BID WITH MEALS    finasteride (PROSCAR) tablet 5 mg  5 mg Oral DAILY    pregabalin (LYRICA) capsule 75 mg  75 mg Oral BID    rosuvastatin (CRESTOR) tablet 10 mg  10 mg Oral QHS    tamsulosin (FLOMAX) capsule 0.4 mg  0.4 mg Oral DAILY    metoclopramide HCl (REGLAN) injection 10 mg  10 mg IntraVENous Q8H    ampicillin-sulbactam (UNASYN) 3 g in 0.9% sodium chloride (MBP/ADV) 100 mL MBP  3 g IntraVENous Q12H     Current Outpatient Medications   Medication Sig    acetaminophen-codeine (Tylenol-Codeine #3) 300-30 mg per tablet Take 1 Tablet by mouth every six (6) hours as needed for Pain for up to 30 days. Max Daily Amount: 4 Tablets. naloxone (Narcan) 4 mg/actuation nasal spray Use 1 spray intranasally, then discard. Repeat with new spray every 2 min as needed for opioid overdose symptoms, alternating nostrils. insulin glargine (LANTUS) 100 unit/mL injection 20 Units by SubCUTAneous route daily. rosuvastatin (CRESTOR) 10 mg tablet Take 1 Tablet by mouth nightly for 60 days. bacitracin zinc (BACITRACIN) ointment Apply  to affected area two (2) times a day. insulin glargine (Lantus Solostar U-100 Insulin) 100 unit/mL (3 mL) inpn 16 Units by SubCUTAneous route as needed (pump malfunction). Ketone Blood Test strp 50 Each by Does Not Apply route as needed for PRN Reason (Other) (as needed for suspected dka). pantoprazole (PROTONIX) 40 mg tablet Take 1 Tablet by mouth Daily (before breakfast).     finasteride (PROSCAR) 5 mg tablet Take 1 Tablet by mouth in the morning. insulin aspart U-100 (NovoLOG U-100 Insulin aspart) 100 unit/mL injection Use as instructed via insulin pump. Dx code E10.65 max daily dose 50U    pregabalin (Lyrica) 75 mg capsule Take 1 Capsule by mouth two (2) times a day. Max Daily Amount: 150 mg. Walker (Ultra-Light Rollator) misc Use while ambulating    amLODIPine (NORVASC) 10 mg tablet Take 1 Tablet by mouth daily. cloNIDine (CATAPRES) 0.1 mg/24 hr ptwk 1 Patch by TransDERmal route every seven (7) days. Dexcom G6  misc Use with the dexcom device to check blood sugars 4 times a day    Dexcom G6 Sensor brandi Use as directed every 10 days    Dexcom G6 Transmitter brandi Use as directed    FreeStyle Connor 14 Day Sensor kit Use as directed every 14 days (Patient not taking: No sig reported)    Insulin Needles, Disposable, 31 gauge x 5/16\" ndle Dx code E11.65, use 6x daily    insulin aspart U-100 (NovoLOG Flexpen U-100 Insulin) 100 unit/mL (3 mL) inpn Take 1 unit for every 15 grams of carbohydrates, 1 unit for every 50 points >150. Max daily dose 25U.    carvediloL (COREG) 12.5 mg tablet Take 1 Tablet by mouth two (2) times daily (with meals). tamsulosin (FLOMAX) 0.4 mg capsule Take 0.4 mg by mouth daily. pen needle, diabetic 30 gauge x 3/16\" ndle 5 Units by Does Not Apply route Before breakfast, lunch, and dinner.         Opal Boyle MD  9/26/2022

## 2022-09-26 NOTE — PROGRESS NOTES
Pt awake and alert. Currently on dialysis. C/O having bowel movement on self. Mom at bedside with change of clothes and assisting patient with self care. [address] started at 10.8 units/hr    1208  Blood sugar 487. Insulin gtt decreased to 8u/hr    1309  Blood glucose 305. Gtt decreased to 4.9 units/hr    Report called to nurse on PCU.    1412  Blood sugar 196. Gtt decreased to 2.7 units/hr    1446  Dialysis complete. Pt to transfer to room 2251.

## 2022-09-26 NOTE — ED PROVIDER NOTES
EMERGENCY DEPARTMENT HISTORY AND PHYSICAL EXAM      Date: 9/26/2022  Patient Name: Mark Weaver    History of Presenting Illness     CC: High glucose      History Provided By: Patient and EMS    HPI: Mark Weaver, 46 y.o. male presents to the ED with cc of high blood sugar. Patient past medical history significant for insulin-dependent diabetes with insulin pump as well as end-stage renal disease dialysis Monday Wednesday Friday. Patient states that during the night his glucose sensor had fallen off and so he had not been getting any insulin this morning he was feeling poorly replaced the sensor and it was reading high. Patient complaining of nausea as well as vomiting. He denies any chest pain or abdominal pain. There is been no diarrhea. He denies any recent fever or chills. He denies any cough or cold symptoms. He denies any current shortness of breath. He denies any headache or neck pain. There are no other complaints, changes, or physical findings at this time. PCP: Jose Zarco MD    No current facility-administered medications on file prior to encounter. Current Outpatient Medications on File Prior to Encounter   Medication Sig Dispense Refill    acetaminophen-codeine (Tylenol-Codeine #3) 300-30 mg per tablet Take 1 Tablet by mouth every six (6) hours as needed for Pain for up to 30 days. Max Daily Amount: 4 Tablets. 15 Tablet 0    naloxone (Narcan) 4 mg/actuation nasal spray Use 1 spray intranasally, then discard. Repeat with new spray every 2 min as needed for opioid overdose symptoms, alternating nostrils. 1 Each 0    insulin glargine (LANTUS) 100 unit/mL injection 20 Units by SubCUTAneous route daily. 1 mL 0    rosuvastatin (CRESTOR) 10 mg tablet Take 1 Tablet by mouth nightly for 60 days. 30 Tablet 1    bacitracin zinc (BACITRACIN) ointment Apply  to affected area two (2) times a day.  28 g 4    insulin glargine (Lantus Solostar U-100 Insulin) 100 unit/mL (3 mL) inpn 16 Units by SubCUTAneous route as needed (pump malfunction). 15 mL 0    Ketone Blood Test strp 50 Each by Does Not Apply route as needed for PRN Reason (Other) (as needed for suspected dka). 50 Strip 3    pantoprazole (PROTONIX) 40 mg tablet Take 1 Tablet by mouth Daily (before breakfast). 30 Tablet 0    finasteride (PROSCAR) 5 mg tablet Take 1 Tablet by mouth in the morning. 30 Tablet 2    insulin aspart U-100 (NovoLOG U-100 Insulin aspart) 100 unit/mL injection Use as instructed via insulin pump. Dx code E10.65 max daily dose 50U 30 mL 2    pregabalin (Lyrica) 75 mg capsule Take 1 Capsule by mouth two (2) times a day. Max Daily Amount: 150 mg. 60 Capsule 2    Walker (Ultra-Light Rollator) misc Use while ambulating 1 Each 0    amLODIPine (NORVASC) 10 mg tablet Take 1 Tablet by mouth daily. 90 Tablet 1    cloNIDine (CATAPRES) 0.1 mg/24 hr ptwk 1 Patch by TransDERmal route every seven (7) days. 12 Patch 1    Dexcom G6  misc Use with the dexcom device to check blood sugars 4 times a day 1 Each 0    Dexcom G6 Sensor brandi Use as directed every 10 days 10 Each 11    Dexcom G6 Transmitter brandi Use as directed 10 Each 3    FreeStyle Connor 14 Day Sensor kit Use as directed every 14 days (Patient not taking: No sig reported) 2 Kit 2    Insulin Needles, Disposable, 31 gauge x 5/16\" ndle Dx code E11.65, use 6x daily 200 Each 11    insulin aspart U-100 (NovoLOG Flexpen U-100 Insulin) 100 unit/mL (3 mL) inpn Take 1 unit for every 15 grams of carbohydrates, 1 unit for every 50 points >150. Max daily dose 25U. 15 mL 3    carvediloL (COREG) 12.5 mg tablet Take 1 Tablet by mouth two (2) times daily (with meals). 60 Tablet 1    tamsulosin (FLOMAX) 0.4 mg capsule Take 0.4 mg by mouth daily. pen needle, diabetic 30 gauge x 3/16\" ndle 5 Units by Does Not Apply route Before breakfast, lunch, and dinner.  100 Each 3       Past History     Past Medical History:  Past Medical History:   Diagnosis Date    Bipolar 1 disorder, depressed ([facility]     Bipolar disorder ([facility]     Chronic kidney disease, stage 3a ([facility] [facility]     Depression     Diabetes ([facility]     DKA, type 1 ([facility] [facility] 2013    diagnosed age [address]    DKA, type 1 ([facility]     H/O noncompliance with medical treatment, presenting hazards to health     MRSA (methicillin resistant staph aureus) culture positive     MRSA (methicillin resistant Staphylococcus aureus)     Face    Noncompliance with medication regimen     Second hand smoke exposure     Seizure ([facility] [address]     Seizures ([facility] [address]  or     one episode during detention       Past Surgical History:  Past Surgical History:   Procedure Laterality Date    HX HEENT      top left wisdom tooth    HX ORTHOPAEDIC Left     wrist; MCV    IR INSERT NON TUNL CVC OVER 5 YRS  2022    IR INSERT TUNL CVC W/O PORT OVER 5 YR  2022    UPPER GI ENDOSCOPY,BIOPSY  2018            Family History:  Family History   Problem Relation Age of Onset    Diabetes Mother     Diabetes Other         neice, type 1        Social History:  Social History     Tobacco Use    Smoking status: Former     Packs/day: 0.10     Years: 16.00     Pack years: 1.60     Types: Cigarettes     Start date: 10/4/1999     Quit date: 2020     Years since quittin.5    Smokeless tobacco: Never   Vaping Use    Vaping Use: Never used   Substance Use Topics    Alcohol use: No    Drug use: No       Allergies:  No Known Allergies      Review of Systems   Review of Systems   Constitutional:  Positive for diaphoresis. Negative for appetite change, chills, fatigue and fever. HENT: Negative. Negative for congestion, rhinorrhea, sinus pressure and sore throat. Eyes: Negative. Respiratory: Negative. Negative for cough, choking, chest tightness, shortness of breath and wheezing. Cardiovascular: Negative. Negative for chest pain, palpitations and leg swelling. Gastrointestinal:  Positive for nausea and vomiting. Negative for abdominal pain, constipation and diarrhea. Endocrine: Negative. Genitourinary: Negative. Negative for difficulty urinating, dysuria, flank pain and urgency. Musculoskeletal: Negative. Skin: Negative. Neurological: Negative. Negative for dizziness, speech difficulty, weakness, light-headedness, numbness and headaches. Psychiatric/Behavioral: Negative. All other systems reviewed and are negative. Physical Exam   Physical Exam  Vitals and nursing note reviewed. Constitutional:       General: He is not in acute distress. Appearance: He is well-developed. He is ill-appearing and diaphoretic. Comments: Dry heaves     HENT:      Head: Normocephalic and atraumatic. Mouth/Throat:      Pharynx: No oropharyngeal exudate. Eyes:      Extraocular Movements: Extraocular movements intact. Conjunctiva/sclera: Conjunctivae normal.      Pupils: Pupils are equal, round, and reactive to light. Neck:      Vascular: No JVD. Trachea: No tracheal deviation. Cardiovascular:      Rate and Rhythm: Normal rate and regular rhythm. Heart sounds: Normal heart sounds. No murmur heard. Pulmonary:      Effort: Pulmonary effort is normal. No respiratory distress. Breath sounds: Normal breath sounds. No stridor. No wheezing or rales. Comments: Dialysis cath    Abdominal:      General: There is no distension. Palpations: Abdomen is soft. Tenderness: There is no abdominal tenderness. There is no guarding or rebound. Musculoskeletal:         General: No tenderness. Normal range of motion. Cervical back: Normal range of motion and neck supple. Skin:     General: Skin is warm. Capillary Refill: Capillary refill takes less than 2 seconds. Neurological:      Mental Status: He is alert and oriented to person, place, and time. Cranial Nerves: No cranial nerve deficit.       Comments: No gross motor or sensory deficits    Psychiatric:         Mood and Affect: Mood normal.         Behavior: Behavior normal.       Diagnostic Study Results     Labs -     Recent Results (from the past 12 hour(s))   CBC WITH AUTOMATED DIFF    Collection Time: 09/26/22  8:05 AM   Result Value Ref Range    WBC 9.3 4.1 - 11.1 K/uL    RBC 3.82 (L) 4.10 - 5.70 M/uL    HGB 10.0 (L) 12.1 - 17.0 g/dL    HCT 33.0 (L) 36.6 - 50.3 %    MCV 86.4 80.0 - 99.0 FL    MCH 26.2 26.0 - 34.0 PG    MCHC 30.3 30.0 - 36.5 g/dL    RDW 15.6 (H) 11.5 - 14.5 %    PLATELET 508 080 - 756 K/uL    MPV 10.7 8.9 - 12.9 FL    NRBC 0.0 0  WBC    ABSOLUTE NRBC 0.00 0.00 - 0.01 K/uL    NEUTROPHILS 86 (H) 32 - 75 %    LYMPHOCYTES 7 (L) 12 - 49 %    MONOCYTES 4 (L) 5 - 13 %    EOSINOPHILS 2 0 - 7 %    BASOPHILS 1 0 - 1 %    IMMATURE GRANULOCYTES 0 0.0 - 0.5 %    ABS. NEUTROPHILS 7.9 1.8 - 8.0 K/UL    ABS. LYMPHOCYTES 0.7 (L) 0.8 - 3.5 K/UL    ABS. MONOCYTES 0.4 0.0 - 1.0 K/UL    ABS. EOSINOPHILS 0.2 0.0 - 0.4 K/UL    ABS. BASOPHILS 0.1 0.0 - 0.1 K/UL    ABS. IMM. GRANS. 0.0 0.00 - 0.04 K/UL    DF SMEAR SCANNED      RBC COMMENTS DEBBIE CELLS  2+        RBC COMMENTS ANISOCYTOSIS  PRESENT       METABOLIC PANEL, COMPREHENSIVE    Collection Time: 09/26/22  8:05 AM   Result Value Ref Range    Sodium 125 (L) 136 - 145 mmol/L    Potassium 5.2 (H) 3.5 - 5.1 mmol/L    Chloride 88 (L) 97 - 108 mmol/L    CO2 19 (L) 21 - 32 mmol/L    Anion gap 18 (H) 5 - 15 mmol/L    Glucose 920 (HH) 65 - 100 mg/dL    BUN 59 (H) 6 - 20 MG/DL    Creatinine 4.73 (H) 0.70 - 1.30 MG/DL    BUN/Creatinine ratio 12 12 - 20      GFR est AA 17 (L) >60 ml/min/1.73m2    GFR est non-AA 14 (L) >60 ml/min/1.73m2    Calcium 8.6 8.5 - 10.1 MG/DL    Bilirubin, total 0.6 0.2 - 1.0 MG/DL    ALT (SGPT) 29 12 - 78 U/L    AST (SGOT) 19 15 - 37 U/L    Alk.  phosphatase 161 (H) 45 - 117 U/L    Protein, total 6.7 6.4 - 8.2 g/dL    Albumin 3.0 (L) 3.5 - 5.0 g/dL    Globulin 3.7 2.0 - 4.0 g/dL    A-G Ratio 0.8 (L) 1.1 - 2.2     MAGNESIUM    Collection Time: 09/26/22  8:05 AM   Result Value Ref Range    Magnesium 2.6 (H) 1.6 - 2.4 mg/dL   PHOSPHORUS    Collection Time: 09/26/22  8:05 AM   Result Value Ref Range    Phosphorus 6.3 (H) 2.6 - 4.7 MG/DL   TROPONIN-HIGH SENSITIVITY    Collection Time: 09/26/22  8:14 AM   Result Value Ref Range    Troponin-High Sensitivity 11 0 - 76 ng/L   SAMPLES BEING HELD    Collection Time: 09/26/22  8:14 AM   Result Value Ref Range    SAMPLES BEING HELD sst,blue,lav     COMMENT        Add-on orders for these samples will be processed based on acceptable specimen integrity and analyte stability, which may vary by analyte. EKG, 12 LEAD, INITIAL    Collection Time: 09/26/22  8:37 AM   Result Value Ref Range    Ventricular Rate 89 BPM    Atrial Rate 89 BPM    P-R Interval 176 ms    QRS Duration 106 ms    Q-T Interval 402 ms    QTC Calculation (Bezet) 489 ms    Calculated P Axis 56 degrees    Calculated R Axis 48 degrees    Calculated T Axis 49 degrees    Diagnosis       Normal sinus rhythm  Prolonged QT  When compared with ECG of 06-SEP-2022 11:35,  Criteria for Septal infarct are no longer present  T wave inversion no longer evident in Anterior leads         Radiologic Studies -   XR CHEST PORT   Final Result   Left basilar patchy airspace disease and small left pleural   effusion. CT Results  (Last 48 hours)      None          CXR Results  (Last 48 hours)      None            Medical Decision Making   I am the first provider for this patient. I reviewed the vital signs, available nursing notes, past medical history, past surgical history, family history and social history. Vital Signs-Reviewed the patient's vital signs. EKG interpretation: (Preliminary)  NSR, rate 89, normal axis/pr/qrs, prolonged QTc.      Records Reviewed: Nursing Notes, Old Medical Records, Previous Radiology Studies, and Previous Laboratory Studies, EMS, patient last admitted 8/11/2022 for DKA, patient followed by Sarai Gallegos nephrology dialysis Monday Wednesday Friday    Provider Notes (Medical Decision Making): DDx-DKA, electrolyte abnormality, dehydration    ED Course:   Initial assessment performed. The patients presenting problems have been discussed, and they are in agreement with the care plan formulated and outlined with them. I have encouraged them to ask questions as they arise throughout their visit. Discussed with patient we will remove his insulin pump while he is here in the emergency department as not to interfere with treatment here. Labs ordered patient is a dialysis patient due for dialysis today. We will consult nephrology once we have further lab work back. Pt elevated glucose 920. There is anion gap- ? Metabolic acidosis secondary to ESRD v DKA, will send Betahydroxybutyrate, Patchy infiltrate on CXR, Blood cultures, procalcitonin, COVID ordered     Consult placed to Nephrology. Consult:  Case discussed with hospitalist, will evaluate and admit. Critical Care Time:   CRITICAL CARE NOTE :    7:54 AM    IMPENDING DETERIORATION -Respiratory, CNS, Metabolic, and Renal  ASSOCIATED RISK FACTORS - Metabolic changes  MANAGEMENT- Bedside Assessment and Supervision of Care  INTERPRETATION -  Xrays, ECG, Blood Pressure, Cardiac Output Measures , and labs  INTERVENTIONS - Small fluid bolus, Glucose stabilizer pathway, IV Ab  CASE REVIEW - Hospitalist/Intensivist, Medical Sub-Specialist, and Nursing  TREATMENT RESPONSE -Stable  PERFORMED BY - Self    NOTES   :  I have spent 40 minutes of critical care time involved in lab review, consultations with specialist, family decision- making, bedside attention and documentation. This time excludes time spent in any separate billed procedures. During this entire length of time I was immediately available to the patient . Tosha Perdomo DO      Disposition:  Admit    Diagnosis     Clinical Impression:   1. Diabetic ketoacidosis without coma associated with type 1 diabetes mellitus ([facility] [address]    2.  Pneumonia due to infectious organism, unspecified laterality, unspecified part of lung    3. ESRD on hemodialysis [facility])        Attestations:    Gaetano Sadler, DO        Please note that this dictation was completed with [employer], the computer voice recognition software. Quite often unanticipated grammatical, syntax, homophones, and other interpretive errors are inadvertently transcribed by the computer software. Please disregard these errors. Please excuse any errors that have escaped final proofreading. Thank you.

## 2022-09-26 NOTE — PROCEDURES
Hemodialysis / [phone]    Vitals Pre Post Assessment Pre Post   BP BP: (!) 156/79 (09/26/22 1300)   159/85 LOC A/O x 3 A/O x 3   HR Pulse (Heart Rate): 96 (09/26/22 1300) 98 Lungs CTA CTA   Resp Resp Rate: 18 (09/26/22 1100) 20 Cardiac s1s2 s1s2   Temp Temp: 97.8 °F (36.6 °C) (09/26/22 1100) 98.1 Skin Intact catheter Intact catheter   Weight  Unable to obtain UTO Edema None None   Tele status Yes Yes Pain  0/10 0/10     Orders   Duration: Start: 1100 End: 1430 Total: 3.5 hours   Dialyzer: Dialyzer/Set Up Inspection: Revaclear (09/26/22 1100)   K Bath: Dialysate K (mEq/L): 2 (09/26/22 1100)   Ca Bath: Dialysate CA (mEq/L): 2.5 (09/26/22 1100)   Na: Dialysate NA (mEq/L): 138 (09/26/22 1100)   Bicarb: Dialysate HCO3 (mEq/L): 35 (09/26/22 1100)   Target Fluid Removal: Goal/Amount of Fluid to Remove (mL): 0 mL (09/26/22 1100)     Access   Type & Location: Right tunneled catheter   Comments:       Good aspirations/flushes. Dressing changed. No s/s infection noted.      Labs   HBsAg (Antigen) / date:  Negative on 9/7/2022                                        HBsAb (Antibody) / date: Susceptible on 9/7/2022   Source: EPIC   Obtained/Reviewed  Critical Results Called HGB   Date Value Ref Range Status   09/26/2022 10.0 (L) 12.1 - 17.0 g/dL Final     Potassium   Date Value Ref Range Status   09/26/2022 5.7 (H) 3.5 - 5.1 mmol/L Final     Calcium   Date Value Ref Range Status   09/26/2022 8.1 (L) 8.5 - 10.1 MG/DL Final     BUN   Date Value Ref Range Status   09/26/2022 64 (H) 6 - 20 MG/DL Final     Creatinine   Date Value Ref Range Status   09/26/2022 4.73 (H) 0.70 - 1.30 MG/DL Final        Meds Given   Name Dose Route   None ordered                 Adequacy / Fluid    Total Liters Process: 76.2   Net Fluid Removed: [id number]=0      Comments   Time Out Done:   (Time) 1020   Admitting Diagnosis: DKA   Consent obtained/signed: Informed Consent Verified: Yes (continuation of chronic dialysis) (09/26/22 1100)   Kylee Tripp / Adwoa Christopher # Machine Number: [id number] (09/26/22 1100)   Primary Nurse Rpt Pre: Bri Virgen RN   Primary Nurse Rpt Post: Bri Virgen RN   Pt Education: Procedure   Care Plan: Continue HD as prescribed   Pts outpatient clinic: [facility] Gaines in [facility] Summary   Comments:  Treatment initiated via rigt Perm. Catheter. Dr. Kasie Sullivan reinforced for no fluid removal today. Tolerated treatment. All possible blood returned. Catheter ports flushed with NS; sterile caps applied.  Endorsed to primary, Bri Virgen RN

## 2022-09-26 NOTE — PROGRESS NOTES
Endurance Catheter insertion note     Inserted 20 G, 6 cm long  Endurance Extended Dwell Peripheral Catheter into right forearm using ultrasound guidance and sterile technique. Positive blood return. Patient tolerated procedure well. Denies questions or concerns at this time. The Endurance catheter is an extended dwell peripheral catheter and may remain in place 29 days. Blood samples can be obtained from this catheter. To obtain labs, a tourniquet may be used, flush with 10ml NS, waste 3ml, draw labs, flush with 20ml NS. Dressings needs to be changed with central line dressing kit using bio patch and stat lock every 7 days by nurse. Primary nurse, Maximo RN notified.           Keara Matthew  RN BSN, Vascular Access Team. PICC Nurse

## 2022-09-26 NOTE — PROGRESS NOTES
Pt received from ED pulled out IV access, PICC team notified for replacement line Gluco stablizer on hold

## 2022-09-26 NOTE — PROGRESS NOTES
Admission History and Physical      NAME:  Gutierrez Mayo   :   1980   MRN:  [id number]     PCP:  Cynthia Becerra MD     Date/Time:  2022           Subjective:     CHIEF COMPLAINT: Nausea vomiting/high glucose    HISTORY OF PRESENT ILLNESS:     Mr. Yuni Gould is a 46 y.o.   M who was discharged from the hospital less than 2 weeks ago, patient recently started on dialysis , last dialyzed on Friday. Also recently been placed on insulin pump. Patient woke up this morning he was not feeling well with nausea/vomiting. His Dexcom glucose reading device was not functioning. His sugars were noted to be elevated, he presented to emergency room. He was having some persistent nausea/vomiting. His labs showed elevated anion gap, elevated bicarb, chest x-ray showed left lung infiltrate. COVID test, procalcitonin, lipase, beta hydroxybutyrate with pending. LFTs were normal.  He was started on insulin drip as per protocol. His sugars still remain elevated at 600. He continues to have nausea vomiting. He is being admitted to hospital for further care. Patient denies any abdominal pain, he had diarrhea earlier today. He had no chest pain, no cough, no shortness of breath, no fever, no chills, no rectal bleed, no headache, no blurred vision, no dysuria, no hematuria. He has a previous Beard catheter in place and is supposed to follow-up with his urologist sometime in the next 2 weeks. He was noted to have severe gastritis and early admissions with some GI bleeds. He supposedly intolerant to heparin/Lovenox.       Past Medical History:   Diagnosis Date    Bipolar 1 disorder, depressed ([facility] [address]     Bipolar disorder ([address] [address]     Chronic kidney disease, stage 3a ([address] [address]     Depression     Diabetes ([address] [address]     DKA, type 1 ([facility] [address] 2013    diagnosed age 23    DKA, type 1 ([facility] [address]     H/O noncompliance with medical treatment, presenting hazards to health     MRSA (methicillin resistant staph aureus) culture positive     MRSA (methicillin resistant Staphylococcus aureus)     Face    Noncompliance with medication regimen     Second hand smoke exposure     Seizure ([facility]     Seizures ([facility] [address] 2006 or 2007    one episode during prison        Past Surgical History:   Procedure Laterality Date    HX HEENT      top left wisdom tooth    HX ORTHOPAEDIC Left     wrist; MCV    IR INSERT NON TUNL CVC OVER 5 YRS  2022    IR INSERT TUNL CVC W/O PORT OVER 5 YR  2022    UPPER GI ENDOSCOPY,BIOPSY  2018            Social History     Tobacco Use    Smoking status: Former     Packs/day: 0.10     Years: 16.00     Pack years: 1.60     Types: Cigarettes     Start date: 10/4/1999     Quit date: 2020     Years since quittin.5    Smokeless tobacco: Never   Substance Use Topics    Alcohol use: No        Family History   Problem Relation Age of Onset    Diabetes Mother     Diabetes Other         neice, type 1         No Known Allergies     Prior to Admission medications    Medication Sig Start Date End Date Taking? Authorizing Provider   acetaminophen-codeine (Tylenol-Codeine #3) 300-30 mg per tablet Take 1 Tablet by mouth every six (6) hours as needed for Pain for up to 30 days. Max Daily Amount: 4 Tablets. 9/13/22 10/13/22  Jey Ramsey MD   naloxone (Narcan) 4 mg/actuation nasal spray Use 1 spray intranasally, then discard. Repeat with new spray every 2 min as needed for opioid overdose symptoms, alternating nostrils. 22   Jey Ramsey MD   insulin glargine (LANTUS) 100 unit/mL injection 20 Units by SubCUTAneous route daily. 22   Marcelo Canela MD   rosuvastatin (CRESTOR) 10 mg tablet Take 1 Tablet by mouth nightly for 60 days. 22  Marcelo Canela MD   bacitracin zinc (BACITRACIN) ointment Apply  to affected area two (2) times a day.  22   Jey Ramsey MD   insulin glargine (Lantus Solostar U-100 Insulin) 100 unit/mL (3 mL) inpn 16 Units by SubCUTAneous route as needed (pump malfunction). 8/23/22   Garrett Montiel MD   Ketone Blood Test strp 50 Each by Does Not Apply route as needed for PRN Reason (Other) (as needed for suspected dka). 8/23/22   Garrett Montiel MD   pantoprazole (PROTONIX) 40 mg tablet Take 1 Tablet by mouth Daily (before breakfast). 8/21/22   Marlys Escamilla MD   finasteride (PROSCAR) 5 mg tablet Take 1 Tablet by mouth in the morning. 7/25/22   Shandra Lazacno MD   insulin aspart U-100 (NovoLOG U-100 Insulin aspart) 100 unit/mL injection Use as instructed via insulin pump. Dx code E10.65 max daily dose 50U 7/15/22   Garrett Montiel MD   pregabalin (Lyrica) 75 mg capsule Take 1 Capsule by mouth two (2) times a day. Max Daily Amount: 150 mg. 7/14/22   Shandra Lazcano MD   Mardee Shallow (Ultra-Light Rollator) misc Use while ambulating 7/14/22   Shandra Lazcano MD   amLODIPine (NORVASC) 10 mg tablet Take 1 Tablet by mouth daily. 7/14/22   Shandra Lazcano MD   cloNIDine (CATAPRES) 0.1 mg/24 hr ptwk 1 Patch by TransDERmal route every seven (7) days. 7/14/22   Shandra Lazcano MD   Dexcom G6  misc Use with the dexcom device to check blood sugars 4 times a day 7/1/22   Garrett Montiel MD   Dexcom G6 Sensor brandi Use as directed every 10 days 6/21/22   Garrett Montiel MD   Dexcom G6 Transmitter brandi Use as directed 6/21/22   Garrett Montiel MD   FreeStyle Connor 14 Day Sensor kit Use as directed every 14 days  Patient not taking: No sig reported 6/21/22   Garrett Montiel MD   Insulin Needles, Disposable, 31 gauge x 5/16\" ndle Dx code E11.65, use 6x daily 6/21/22   Garrett Montiel MD   insulin aspart U-100 (NovoLOG Flexpen U-100 Insulin) 100 unit/mL (3 mL) inpn Take 1 unit for every 15 grams of carbohydrates, 1 unit for every 50 points >150. Max daily dose 25U. 6/21/22   Garrett Montiel MD   carvediloL (COREG) 12.5 mg tablet Take 1 Tablet by mouth two (2) times daily (with meals).  5/19/22   Shandra Lazcano MD   tamsulosin (FLOMAX) 0.4 mg capsule Take 0.4 mg by mouth daily. Provider, Historical   pen needle, diabetic 30 gauge x 3/16\" ndle 5 Units by Does Not Apply route Before breakfast, lunch, and dinner. 2/23/22   Marcy Reis MD         Review of Systems:  Pertinent findings there was remaining 10 review of system negative         Objective:      VITALS:    Vital signs reviewed; most recent are:    Visit Vitals  BP (!) 119/57   Pulse 89   Temp 98.2 °F (36.8 °C)   Resp 16   Ht 5' 6\" (1.676 m)   Wt 68 kg (150 lb)   SpO2 100%   BMI 24.21 kg/m²     SpO2 Readings from Last 6 Encounters:   09/26/22 100%   09/13/22 96%   09/12/22 95%   08/25/22 96%   08/20/22 94%   08/09/22 95%        No intake or output data in the 24 hours ending 09/26/22 1029         Exam:     Physical Exam:    Gen: ill-looking,  HEENT:   dry moist mucous membranes  Neck:  Supple   Resp:  No accessory muscle use, symmetric expansion  Card:  no peripheral edema, tachycardic  Abd:  Soft, non-tender, non-distended,    Lymph:  No visible cervical adenopathy  Musc:  No visible cyanosis or clubbing  Skin:  No visible rashes   Neuro:   follows commands appropriately, no focal deficits  Psych:   Anxious, not agitated       Labs:    Recent Labs     09/26/22  0805   WBC 9.3   HGB 10.0*   HCT 33.0*        Recent Labs     09/26/22  0805   *   K 5.2*   CL 88*   CO2 19*   *   BUN 59*   CREA 4.73*   CA 8.6   MG 2.6*   PHOS 6.3*   ALB 3.0*   TBILI 0.6   ALT 29     Lab Results   Component Value Date/Time    Glucose (POC) 130 (H) 09/12/2022 10:27 AM    Glucose (POC) 288 (H) 09/11/2022 09:09 PM    Glucose, POC >700 (HH) 09/26/2022 10:18 AM     Recent Labs     09/26/22  1018   HCO3 16     No results for input(s): INR, INREXT in the last 72 hours. CXR-IMPRESSION  Left basilar patchy airspace disease and small left pleural  effusion.       Medical records reviewed in preparation for this admission: yes      Assesment and Plan:    IDDM with DKA-  Cont insulin drip per Glucostabilizer protocol  Unable to use IVF as ESRD  Monitor BG every hour's  Monitor BMP every 4 hours  Follow-up beta hydroxybutyrate  Diabetic teaching  Endocrinology consult while in the hospital  Follow repeat A1c  Hold patient's insulin pump while hospitalized    ESRD-  Recently started on HD, MWF  Last HD was Friday  Nephrology consulted for dialysis needs  Not in fluid overload, lites looks okay    Pseudohyponatremia-from elevated BG, will correct if BG improves    Intractable N/V-likely from DKA, abdominal exam benign, check lipase, if no improvement consider further work-up and GI eval, NPO except meds. Symptomatic treatment for now    Severe esophageal/gastric inflammation on previous EGD-IV PPI, avoid heparin/Lovenox products    L Lung infiltrate-probably may have aspirated, empiric IV antibiotic with Unasyn, follow procalcitonin, follow blood cultures.     HTN-slowly add back home medications as BP allows    ADFC  DVT PRx SCD  NOK     Dispo: home if improved 48-72hrs           ___________________________________________________    Attending Physician: Abdiaziz Sheriff MD   9/26/2022 Initial (On Arrival)

## 2022-09-27 ENCOUNTER — APPOINTMENT (OUTPATIENT)
Dept: GENERAL RADIOLOGY | Age: 42
DRG: 420 | End: 2022-09-27
Attending: HOSPITALIST
Payer: MEDICAID

## 2022-09-27 LAB
ANION GAP SERPL CALC-SCNC: 13 MMOL/L (ref 5–15)
BASOPHILS # BLD: 0.1 K/UL (ref 0–0.1)
BASOPHILS NFR BLD: 1 % (ref 0–1)
BUN SERPL-MCNC: 26 MG/DL (ref 6–20)
BUN/CREAT SERPL: 9 (ref 12–20)
CALCIUM SERPL-MCNC: 8.4 MG/DL (ref 8.5–10.1)
CHLORIDE SERPL-SCNC: 102 MMOL/L (ref 97–108)
CO2 SERPL-SCNC: 23 MMOL/L (ref 21–32)
CREAT SERPL-MCNC: 2.98 MG/DL (ref 0.7–1.3)
DIFFERENTIAL METHOD BLD: ABNORMAL
EOSINOPHIL # BLD: 0.1 K/UL (ref 0–0.4)
EOSINOPHIL NFR BLD: 1 % (ref 0–7)
ERYTHROCYTE [DISTWIDTH] IN BLOOD BY AUTOMATED COUNT: 14.7 % (ref 11.5–14.5)
GLUCOSE BLD STRIP.AUTO-MCNC: 120 MG/DL (ref 65–117)
GLUCOSE BLD STRIP.AUTO-MCNC: 123 MG/DL (ref 65–117)
GLUCOSE BLD STRIP.AUTO-MCNC: 143 MG/DL (ref 65–117)
GLUCOSE BLD STRIP.AUTO-MCNC: 146 MG/DL (ref 65–117)
GLUCOSE BLD STRIP.AUTO-MCNC: 148 MG/DL (ref 65–117)
GLUCOSE BLD STRIP.AUTO-MCNC: 161 MG/DL (ref 65–117)
GLUCOSE BLD STRIP.AUTO-MCNC: 180 MG/DL (ref 65–117)
GLUCOSE BLD STRIP.AUTO-MCNC: 180 MG/DL (ref 65–117)
GLUCOSE BLD STRIP.AUTO-MCNC: 186 MG/DL (ref 65–117)
GLUCOSE BLD STRIP.AUTO-MCNC: 188 MG/DL (ref 65–117)
GLUCOSE BLD STRIP.AUTO-MCNC: 203 MG/DL (ref 65–117)
GLUCOSE BLD STRIP.AUTO-MCNC: 216 MG/DL (ref 65–117)
GLUCOSE BLD STRIP.AUTO-MCNC: 244 MG/DL (ref 65–117)
GLUCOSE BLD STRIP.AUTO-MCNC: 268 MG/DL (ref 65–117)
GLUCOSE SERPL-MCNC: 188 MG/DL (ref 65–100)
HCT VFR BLD AUTO: 30.1 % (ref 36.6–50.3)
HGB BLD-MCNC: 9.4 G/DL (ref 12.1–17)
IMM GRANULOCYTES # BLD AUTO: 0 K/UL (ref 0–0.04)
IMM GRANULOCYTES NFR BLD AUTO: 0 % (ref 0–0.5)
LYMPHOCYTES # BLD: 1.6 K/UL (ref 0.8–3.5)
LYMPHOCYTES NFR BLD: 19 % (ref 12–49)
MAGNESIUM SERPL-MCNC: 2.1 MG/DL (ref 1.6–2.4)
MCH RBC QN AUTO: 26 PG (ref 26–34)
MCHC RBC AUTO-ENTMCNC: 31.2 G/DL (ref 30–36.5)
MCV RBC AUTO: 83.1 FL (ref 80–99)
MONOCYTES # BLD: 0.8 K/UL (ref 0–1)
MONOCYTES NFR BLD: 9 % (ref 5–13)
NEUTS SEG # BLD: 5.9 K/UL (ref 1.8–8)
NEUTS SEG NFR BLD: 70 % (ref 32–75)
NRBC # BLD: 0 K/UL (ref 0–0.01)
NRBC BLD-RTO: 0 PER 100 WBC
PLATELET # BLD AUTO: 312 K/UL (ref 150–400)
PMV BLD AUTO: 10.8 FL (ref 8.9–12.9)
POTASSIUM SERPL-SCNC: 3.3 MMOL/L (ref 3.5–5.1)
RBC # BLD AUTO: 3.62 M/UL (ref 4.1–5.7)
SERVICE CMNT-IMP: ABNORMAL
SODIUM SERPL-SCNC: 138 MMOL/L (ref 136–145)
WBC # BLD AUTO: 8.4 K/UL (ref 4.1–11.1)

## 2022-09-27 PROCEDURE — 65270000046 HC RM TELEMETRY

## 2022-09-27 PROCEDURE — 99222 1ST HOSP IP/OBS MODERATE 55: CPT | Performed by: INTERNAL MEDICINE

## 2022-09-27 PROCEDURE — 71046 X-RAY EXAM CHEST 2 VIEWS: CPT

## 2022-09-27 PROCEDURE — 74011000258 HC RX REV CODE- 258: Performed by: HOSPITALIST

## 2022-09-27 PROCEDURE — 74011636637 HC RX REV CODE- 636/637: Performed by: INTERNAL MEDICINE

## 2022-09-27 PROCEDURE — 82962 GLUCOSE BLOOD TEST: CPT

## 2022-09-27 PROCEDURE — 74011000250 HC RX REV CODE- 250: Performed by: HOSPITALIST

## 2022-09-27 PROCEDURE — 74011250637 HC RX REV CODE- 250/637: Performed by: INTERNAL MEDICINE

## 2022-09-27 PROCEDURE — 36415 COLL VENOUS BLD VENIPUNCTURE: CPT

## 2022-09-27 PROCEDURE — 83735 ASSAY OF MAGNESIUM: CPT

## 2022-09-27 PROCEDURE — C9113 INJ PANTOPRAZOLE SODIUM, VIA: HCPCS | Performed by: HOSPITALIST

## 2022-09-27 PROCEDURE — 80048 BASIC METABOLIC PNL TOTAL CA: CPT

## 2022-09-27 PROCEDURE — 74011250636 HC RX REV CODE- 250/636: Performed by: HOSPITALIST

## 2022-09-27 PROCEDURE — 85025 COMPLETE CBC W/AUTO DIFF WBC: CPT

## 2022-09-27 PROCEDURE — 74011250637 HC RX REV CODE- 250/637: Performed by: HOSPITALIST

## 2022-09-27 PROCEDURE — 74011250636 HC RX REV CODE- 250/636: Performed by: NURSE PRACTITIONER

## 2022-09-27 RX ORDER — INSULIN GLARGINE 100 [IU]/ML
20 INJECTION, SOLUTION SUBCUTANEOUS DAILY
Status: DISCONTINUED | OUTPATIENT
Start: 2022-09-27 | End: 2022-09-27

## 2022-09-27 RX ORDER — POTASSIUM CHLORIDE 750 MG/1
20 TABLET, FILM COATED, EXTENDED RELEASE ORAL
Status: COMPLETED | OUTPATIENT
Start: 2022-09-27 | End: 2022-09-27

## 2022-09-27 RX ORDER — INSULIN LISPRO 100 [IU]/ML
INJECTION, SOLUTION INTRAVENOUS; SUBCUTANEOUS
Status: DISCONTINUED | OUTPATIENT
Start: 2022-09-27 | End: 2022-09-28 | Stop reason: HOSPADM

## 2022-09-27 RX ORDER — INSULIN LISPRO 100 [IU]/ML
INJECTION, SOLUTION INTRAVENOUS; SUBCUTANEOUS
Status: DISCONTINUED | OUTPATIENT
Start: 2022-09-27 | End: 2022-09-27

## 2022-09-27 RX ORDER — INSULIN LISPRO 100 [IU]/ML
6 INJECTION, SOLUTION INTRAVENOUS; SUBCUTANEOUS
Status: DISCONTINUED | OUTPATIENT
Start: 2022-09-27 | End: 2022-09-28 | Stop reason: HOSPADM

## 2022-09-27 RX ORDER — INSULIN GLARGINE 100 [IU]/ML
20 INJECTION, SOLUTION SUBCUTANEOUS DAILY
Status: DISCONTINUED | OUTPATIENT
Start: 2022-09-28 | End: 2022-09-28 | Stop reason: HOSPADM

## 2022-09-27 RX ORDER — INSULIN LISPRO 100 [IU]/ML
8 INJECTION, SOLUTION INTRAVENOUS; SUBCUTANEOUS ONCE
Status: COMPLETED | OUTPATIENT
Start: 2022-09-27 | End: 2022-09-27

## 2022-09-27 RX ORDER — LIDOCAINE HYDROCHLORIDE 20 MG/ML
JELLY TOPICAL ONCE
Status: COMPLETED | OUTPATIENT
Start: 2022-09-27 | End: 2022-09-27

## 2022-09-27 RX ORDER — MAGNESIUM SULFATE 100 %
4 CRYSTALS MISCELLANEOUS AS NEEDED
Status: DISCONTINUED | OUTPATIENT
Start: 2022-09-27 | End: 2022-09-28 | Stop reason: HOSPADM

## 2022-09-27 RX ORDER — MAGNESIUM SULFATE 100 %
4 CRYSTALS MISCELLANEOUS AS NEEDED
Status: DISCONTINUED | OUTPATIENT
Start: 2022-09-27 | End: 2022-09-27 | Stop reason: SDUPTHER

## 2022-09-27 RX ORDER — CLONIDINE 0.1 MG/24H
1 PATCH, EXTENDED RELEASE TRANSDERMAL
Status: DISCONTINUED | OUTPATIENT
Start: 2022-09-27 | End: 2022-09-28 | Stop reason: HOSPADM

## 2022-09-27 RX ORDER — AMLODIPINE BESYLATE 5 MG/1
10 TABLET ORAL DAILY
Status: DISCONTINUED | OUTPATIENT
Start: 2022-09-27 | End: 2022-09-28 | Stop reason: HOSPADM

## 2022-09-27 RX ADMIN — SODIUM CHLORIDE, PRESERVATIVE FREE 10 ML: 5 INJECTION INTRAVENOUS at 14:48

## 2022-09-27 RX ADMIN — Medication 6 UNITS: at 17:31

## 2022-09-27 RX ADMIN — LIDOCAINE HYDROCHLORIDE: 20 JELLY TOPICAL at 03:50

## 2022-09-27 RX ADMIN — SODIUM CHLORIDE, PRESERVATIVE FREE 40 MG: 5 INJECTION INTRAVENOUS at 22:14

## 2022-09-27 RX ADMIN — METOCLOPRAMIDE 10 MG: 5 INJECTION, SOLUTION INTRAMUSCULAR; INTRAVENOUS at 06:16

## 2022-09-27 RX ADMIN — Medication 8 UNITS: at 17:31

## 2022-09-27 RX ADMIN — INSULIN GLARGINE 20 UNITS: 100 INJECTION, SOLUTION SUBCUTANEOUS at 10:38

## 2022-09-27 RX ADMIN — AMLODIPINE BESYLATE 10 MG: 5 TABLET ORAL at 17:32

## 2022-09-27 RX ADMIN — METOCLOPRAMIDE 10 MG: 5 INJECTION, SOLUTION INTRAMUSCULAR; INTRAVENOUS at 14:48

## 2022-09-27 RX ADMIN — CARVEDILOL 12.5 MG: 12.5 TABLET, FILM COATED ORAL at 08:47

## 2022-09-27 RX ADMIN — SODIUM CHLORIDE, PRESERVATIVE FREE 10 ML: 5 INJECTION INTRAVENOUS at 22:14

## 2022-09-27 RX ADMIN — TAMSULOSIN HYDROCHLORIDE 0.4 MG: 0.4 CAPSULE ORAL at 08:47

## 2022-09-27 RX ADMIN — ONDANSETRON 4 MG: 2 INJECTION INTRAMUSCULAR; INTRAVENOUS at 08:48

## 2022-09-27 RX ADMIN — CARVEDILOL 12.5 MG: 12.5 TABLET, FILM COATED ORAL at 17:32

## 2022-09-27 RX ADMIN — SODIUM CHLORIDE, PRESERVATIVE FREE 40 MG: 5 INJECTION INTRAVENOUS at 08:47

## 2022-09-27 RX ADMIN — HEPARIN SODIUM 5000 UNITS: 5000 INJECTION INTRAVENOUS; SUBCUTANEOUS at 17:32

## 2022-09-27 RX ADMIN — PREGABALIN 75 MG: 75 CAPSULE ORAL at 08:47

## 2022-09-27 RX ADMIN — FINASTERIDE 5 MG: 5 TABLET, FILM COATED ORAL at 08:47

## 2022-09-27 RX ADMIN — POTASSIUM CHLORIDE 20 MEQ: 750 TABLET, FILM COATED, EXTENDED RELEASE ORAL at 17:32

## 2022-09-27 RX ADMIN — AMPICILLIN SODIUM AND SULBACTAM SODIUM 3 G: 2; 1 INJECTION, POWDER, FOR SOLUTION INTRAMUSCULAR; INTRAVENOUS at 10:38

## 2022-09-27 RX ADMIN — HYDRALAZINE HYDROCHLORIDE 10 MG: 20 INJECTION INTRAMUSCULAR; INTRAVENOUS at 15:11

## 2022-09-27 RX ADMIN — SODIUM CHLORIDE, PRESERVATIVE FREE 10 ML: 5 INJECTION INTRAVENOUS at 06:16

## 2022-09-27 RX ADMIN — ROSUVASTATIN CALCIUM 10 MG: 10 TABLET, COATED ORAL at 22:14

## 2022-09-27 RX ADMIN — PREGABALIN 75 MG: 75 CAPSULE ORAL at 17:31

## 2022-09-27 RX ADMIN — HYDRALAZINE HYDROCHLORIDE 10 MG: 20 INJECTION INTRAMUSCULAR; INTRAVENOUS at 00:03

## 2022-09-27 NOTE — PROGRESS NOTES
Received notification from bedside RN about patient with regards to: BP trending up, needs PRN medication  VS: /78, HR 97, RR 17, O2 sat 95%     Intervention given: Hydralazine IV PRN ordered

## 2022-09-27 NOTE — PROGRESS NOTES
Reason for Readmission:   DKA           RUR Score/Risk Level:   34%      PCP: First and Last name:  Perri Mckee MD   Name of Practice:    Are you a current patient: Yes/No:    Approximate date of last visit: Unsure   Can you participate in a virtual visit with your PCP:     Is a Care Conference indicated: No      Did you attend your follow up appointment (s): If not, why not:  No, returned to hospital before appointment       Resources/supports as identified by patient/family:  Patient lives with his mother        Top Challenges facing patient (as identified by patient/family and CM): Finances/Medication cost?   No issues    Transportation   No issues     Support system or lack thereof? No issues    Living arrangements? No issues      Self-care/ADLs/Cognition? No issues          Current Advanced Directive/Advance Care Plan: FULL CODE  CM confirmed that he is a Full Code            Plan for utilizing home health:   Declines             Transition of Care Plan:    Based on readmission, the patient's previous Plan of Care   has been evaluated and/or modified. The current Transition of Care Plan is:         Patient lives at home with his mother. He is independent in care with a little assistance from his mother. Patient is a new dialysis patient at [facility] at 9am.  Current Dispo: Home/self.

## 2022-09-27 NOTE — PROGRESS NOTES
Hospitalist Progress Note    NAME: Brady Morales   :  1980   MRN:  [id number]       Assessment / Plan:  DKA POA- resolved s/p IV insulin overnight  Insulin dependent DM type 1 POA- on insulin pump at home, pt claims pump not malfunctioning,- just CGM fell off last night when he went to sleep per pt    S/p insulin drip per Glucostabilizer protocol- Now off it  S/p 20 units Lantus before stopping insulin drip  Unable to use IVF as ESRD  DC BMP every 4 hrs - in AM now  Diabetic teaching  IP Endocrinology consult while in the hospital-- resumed the Insulin pump today with setting changes  Repeat A1c= 7.2  Po diet as tolerated - diabetic restrictions     ESRD-  Recently started on HD, MWF  S/p HD in ER yesterday- HD tomorrow per schedule  Nephrology consulted for dialysis needs  Not in fluid overload, lites looks okay     Pseudohyponatremia-from elevated BG, resolved now     Intractable N/V-likely from DKA, abdominal exam benign, check lipase,  Resolved now, advance PO diet as able today     Severe esophageal/gastric inflammation on previous EGD-IV PPI, avoid heparin/Lovenox products     L Lung infiltrate-probably may have aspirated,   Cont empiric IV antibiotic with Unasyn, for now-- DC in 24 hrs  Procalcitonin 0.94   follow blood cultures- neg in 21 hrs     HTN-  Resume Amlodipine, clonidine  Cont coreg      Estimated discharge date:   Barriers: none    Code status: Full  Prophylaxis: Hep SQ  Recommended Disposition: Home w/Family     Subjective:     Chief Complaint / Reason for Physician Visit: F/U Nausea/Vomiting, DKA, ESRD on HD  \"I am better\". Discussed with RN events overnight.      Review of Systems:  Symptom Y/N Comments  Symptom Y/N Comments   Fever/Chills n   Chest Pain n    Poor Appetite n   Edema n    Cough n   Abdominal Pain n    Sputum n   Joint Pain     SOB/JOHNSTON n   Pruritis/Rash     Nausea/vomit n resolved  Tolerating PT/OT y    Diarrhea    Tolerating Diet y    Constipation Other       Could NOT obtain due to:      Objective:     VITALS:   Last 24hrs VS reviewed since prior progress note. Most recent are:  Patient Vitals for the past 24 hrs:   Temp Pulse Resp BP SpO2   09/27/22 1454 98.4 °F (36.9 °C) 93 15 (!) 181/89 94 %   09/27/22 1151 98.4 °F (36.9 °C) 91 11 (!) 152/69 93 %   09/27/22 0745 98.6 °F (37 °C) 99 19 (!) 149/72 93 %   09/27/22 0252 98.6 °F (37 °C) 100 18 (!) 159/73 96 %   09/27/22 0025 -- -- -- 133/68 --   09/26/22 2332 98 °F (36.7 °C) 97 17 (!) 172/78 95 %   09/26/22 2220 98.9 °F (37.2 °C) -- -- -- --   09/26/22 2128 (!) 100.6 °F (38.1 °C) -- -- -- --   09/26/22 1930 98 °F (36.7 °C) (!) 105 16 (!) 161/75 97 %       Intake/Output Summary (Last 24 hours) at 9/27/2022 1546  Last data filed at 9/27/2022 0306  Gross per 24 hour   Intake --   Output 400 ml   Net -400 ml        I had a face to face encounter and independently examined this patient on 9/27/2022, as outlined below:  PHYSICAL EXAM:  General: WD, WN. Alert, cooperative, no acute distress    EENT:  EOMI. Anicteric sclerae. MMM  Resp:  CTA bilaterally, no wheezing or rales. No accessory muscle use  CV:  Regular  rhythm,  No edema  GI:  Soft, Non distended, Non tender. +Bowel sounds  Neurologic:  Alert and oriented X 3, normal speech,   Psych:   Good insight. Not anxious nor agitated  Skin:  No rashes. No jaundice    Reviewed most current lab test results and cultures  YES  Reviewed most current radiology test results   YES  Review and summation of old records today    NO  Reviewed patient's current orders and MAR    YES  PMH/SH reviewed - no change compared to H&P  ________________________________________________________________________  Care Plan discussed with:    Comments   Patient x    Family      RN x    Care Manager x    Consultant                       x Multidiciplinary team rounds were held today with , nursing, pharmacist and clinical coordinator.   Patient's plan of care was discussed; medications were reviewed and discharge planning was addressed. ________________________________________________________________________  Total NON critical care TIME:  36   Minutes    Total CRITICAL CARE TIME Spent:   Minutes non procedure based      Comments   >50% of visit spent in counseling and coordination of care     ________________________________________________________________________  Yaima Gaitan MD     Procedures: see electronic medical records for all procedures/Xrays and details which were not copied into this note but were reviewed prior to creation of Plan. LABS:  I reviewed today's most current labs and imaging studies.   Pertinent labs include:  Recent Labs     09/27/22 0248 09/26/22 2134 09/26/22  1415 09/26/22  0805   WBC 8.4  --   --  9.3   HGB 9.4* 9.3* 9.2* 10.0*   HCT 30.1* 29.2* 29.3* 33.0*     --   --  282     Recent Labs     09/27/22 0248 09/26/22 2134 09/26/22  1415 09/26/22  0934 09/26/22  0805    136 136 126* 125*   K 3.3* 3.3* 3.1* 5.7* 5.2*    103 100 90* 88*   CO2 23 25 31 17* 19*   * 108* 209* 946* 920*   BUN 26* 24* 20 64* 59*   CREA 2.98* 2.67* 1.77* 4.73* 4.73*   CA 8.4* 8.3* 8.1* 8.1* 8.6   MG 2.1 2.2 2.1 2.5* 2.6*   PHOS  --   --   --  6.7* 6.3*   ALB  --   --   --   --  3.0*   TBILI  --   --   --   --  0.6   ALT  --   --   --   --  29       Signed: Yaima Gaitan MD

## 2022-09-27 NOTE — CONSULTS
Consult    Patient: Brady Morales MRN: [id number]  SSN: [id number]    YOB: 1980  Age: 46 y.o. Sex: male      Subjective: Brady Morales is a 46 y.o. male who is being seen for DKA. Mr. Katty Hernandez is a patient of Dr. Koffi Arnett who saw him in June 2022. Since that time he has been in the hospital for DKA twice and respiratory failure and worsening renal function three times. After his last hospitalization on 9/6/22 he was started on HD on M/W/F. Pt reports that he fell asleep last night and his CGM fell off. When he woke up his BG was \"very high\" and he came to the ED. His BG was 920 and his AG was 18. He was started on an insulin drip. Pt also had a CXR that showed concern for possible aspiration. Pt insists that his insulin pump has been working normally with no errors or problems. He changes his infusion sets every 4 days. He notes his BGs run in the 250 or less, but has not had issues of hypoglycemia. Pt insists he has been using his insulin pump and has been bolusing for his meals. When he was admitted his insulin pump was suspended, but not taken off. Pt still has the insulin pump attached and he notes the placed the current infusion site on Sunday (today would be day 2). On admission he was started on an insulin drip and once his BG dropped to 120 the hospitalist stopped the insulin drip and give him a 20 units dose of Lantus around 11AM today. Reviewing his insulin pump settings  Basal  MN-8AM 0.65 units per hour  8AM-Noon 0.85 units per hour  Noon-5PM 1.5 units per hour  5PM-MN 1.4 units per hour  Carb Ratio  1:15  Correction Factor  1:50. Pt denies issus of CP, SOB, N/V, HA, abdominal pains.     Past Medical History:   Diagnosis Date    Bipolar 1 disorder, depressed ([facility]     Bipolar disorder ([facility]     Chronic kidney disease, stage 3a ([address]     Depression     Diabetes ([facility]     DKA, type 1 ([facility] 1/27/2013    diagnosed age 23    [facility], [facility]     H/O noncompliance with medical treatment, presenting hazards to health     MRSA (methicillin resistant staph aureus) culture positive     MRSA (methicillin resistant Staphylococcus aureus)     Face    Noncompliance with medication regimen     Second hand smoke exposure     Seizure ([facility]     Seizures ([facility]  or     one episode during nursing home     Past Surgical History:   Procedure Laterality Date    HX HEENT      top left wisdom tooth    HX ORTHOPAEDIC Left     wrist; MCV    IR INSERT NON TUNL CVC OVER 5 YRS  2022    IR INSERT TUNL CVC W/O PORT OVER 5 YR  2022    UPPER GI ENDOSCOPY,BIOPSY  2018           Family History   Problem Relation Age of Onset    Diabetes Mother     Diabetes Other         neice, type 1      Social History     Tobacco Use    Smoking status: Former     Packs/day: 0.10     Years: 16.00     Pack years: 1.60     Types: Cigarettes     Start date: 10/4/1999     Quit date: 2020     Years since quittin.5    Smokeless tobacco: Never   Substance Use Topics    Alcohol use: No      Current Facility-Administered Medications   Medication Dose Route Frequency Provider Last Rate Last Admin    glucose chewable tablet 16 g  4 Tablet Oral PRN Suly Nearing, DO        glucagon (GLUCAGEN) injection 1 mg  1 mg IntraMUSCular PRN Suly Nearing, DO        dextrose 10% infusion 0-250 mL  0-250 mL IntraVENous PRN Suly Nearing, DO        sodium chloride (NS) flush 5-40 mL  5-40 mL IntraVENous Q8H Zeke Barone MD   10 mL at 22 0616    sodium chloride (NS) flush 5-40 mL  5-40 mL IntraVENous PRN Zeke Barone MD        acetaminophen (TYLENOL) tablet 650 mg  650 mg Oral Q6H PRN Zeke Barone MD   650 mg at 22 2136    Or    acetaminophen (TYLENOL) suppository 650 mg  650 mg Rectal Q6H PRN Zeke Barone MD        polyethylene glycol (MIRALAX) packet 17 g  17 g Oral DAILY PRN Zeke Barone MD        ondansetron (ZOFRAN ODT) tablet 4 mg  4 mg Oral Q8H PRN Zeke Barone MD        Or    [facility]) injection 4 mg  4 mg IntraVENous Q6H PRN Zeke Barone MD   4 mg at 09/27/22 0848    [Held by provider] heparin (porcine) injection 5,000 Units  5,000 Units SubCUTAneous BID Zeke Barone MD        pantoprazole (PROTONIX) 40 mg in 0.9% sodium chloride 10 mL injection  40 mg IntraVENous Q12H Zeke Barone MD   40 mg at 09/27/22 0847    carvediloL (COREG) tablet 12.5 mg  12.5 mg Oral BID WITH MEALS Zeke Barone MD   12.5 mg at 09/27/22 0847    finasteride (PROSCAR) tablet 5 mg  5 mg Oral DAILY Zeke Barone MD   5 mg at 09/27/22 0847    pregabalin (LYRICA) capsule 75 mg  75 mg Oral BID Zeke Barone MD   75 mg at 09/27/22 0847    rosuvastatin (CRESTOR) tablet 10 mg  10 mg Oral QHS Zeke Barone MD   10 mg at 09/26/22 2103    tamsulosin (FLOMAX) capsule 0.4 mg  0.4 mg Oral DAILY Zeke Barone MD   0.4 mg at 09/27/22 0847    metoclopramide HCl (REGLAN) injection 10 mg  10 mg IntraVENous Q8H Zeke Barone MD   10 mg at 09/27/22 0616    ampicillin-sulbactam (UNASYN) 3 g in 0.9% sodium chloride (MBP/ADV) 100 mL MBP  3 g IntraVENous Q12H Zeke Barone  mL/hr at 09/27/22 1038 3 g at 09/27/22 1038    hydrALAZINE (APRESOLINE) 20 mg/mL injection 10 mg  10 mg IntraVENous Q6H PRN Juliette Amin NP   10 mg at 09/27/22 0003        No Known Allergies    Review of Systems:  A comprehensive review of systems was negative. Objective:     Vitals:    09/26/22 2332 09/27/22 0025 09/27/22 0252 09/27/22 0745   BP: (!) 172/78 133/68 (!) 159/73 (!) 149/72   Pulse: 97  100 99   Resp: 17  18 19   Temp: 98 °F (36.7 °C)  98.6 °F (37 °C) 98.6 °F (37 °C)   TempSrc:       SpO2: 95%  96% 93%   Weight:       Height:            Physical Exam:  GENERAL: alert, cooperative, no distress, appears stated age  EYE: negative  LYMPHATIC: Cervical, supraclavicular, and axillary nodes normal.   THROAT & NECK: normal and no erythema or exudates noted.    LUNG: diminished breath sounds L base  HEART: regular rate and rhythm, S1, S2 normal, no murmur, click, rub or gallop  ABDOMEN: soft, non-tender.  Bowel sounds normal. No masses,  no organomegaly  EXTREMITIES:  extremities normal, atraumatic, no cyanosis or edema  SKIN: Normal.  NEUROLOGIC: negative    Recent Results (from the past 24 hour(s))   GLUCOSE, POC    Collection Time: 09/26/22 12:06 PM   Result Value Ref Range    Glucose (POC) 487 (H) 65 - 117 mg/dL    Performed by Claudia Silva RN    GLUCOSE, POC    Collection Time: 09/26/22  1:09 PM   Result Value Ref Range    Glucose (POC) 305 (H) 65 - 117 mg/dL    Performed by Claudia Silva RN    GLUCOSE, POC    Collection Time: 09/26/22  2:12 PM   Result Value Ref Range    Glucose (POC) 196 (H) 65 - 117 mg/dL    Performed by Claudia Silva RN    HGB & HCT    Collection Time: 09/26/22  2:15 PM   Result Value Ref Range    HGB 9.2 (L) 12.1 - 17.0 g/dL    HCT 29.3 (L) 36.6 - 95.4 %   METABOLIC PANEL, BASIC    Collection Time: 09/26/22  2:15 PM   Result Value Ref Range    Sodium 136 136 - 145 mmol/L    Potassium 3.1 (L) 3.5 - 5.1 mmol/L    Chloride 100 97 - 108 mmol/L    CO2 31 21 - 32 mmol/L    Anion gap 5 5 - 15 mmol/L    Glucose 209 (H) 65 - 100 mg/dL    BUN 20 6 - 20 MG/DL    Creatinine 1.77 (H) 0.70 - 1.30 MG/DL    BUN/Creatinine ratio 11 (L) 12 - 20      GFR est AA 52 (L) >60 ml/min/1.73m2    GFR est non-AA 43 (L) >60 ml/min/1.73m2    Calcium 8.1 (L) 8.5 - 10.1 MG/DL   MAGNESIUM    Collection Time: 09/26/22  2:15 PM   Result Value Ref Range    Magnesium 2.1 1.6 - 2.4 mg/dL   GLUCOSE, POC    Collection Time: 09/26/22  3:21 PM   Result Value Ref Range    Glucose (POC) 193 (H) 65 - 117 mg/dL    Performed by Berta Capellan RN    GLUCOSE, POC    Collection Time: 09/26/22  5:24 PM   Result Value Ref Range    Glucose (POC) 162 (H) 65 - 117 mg/dL    Performed by [employer] PCT    GLUCOSE, POC    Collection Time: 09/26/22  6:48 PM   Result Value Ref Range    Glucose (POC) 167 (H) 65 - 117 mg/dL    Performed by [employer] PCT    GLUCOSE, POC Collection Time: 09/26/22  8:54 PM   Result Value Ref Range    Glucose (POC) 128 (H) 65 - 117 mg/dL    Performed by [place], BASIC    Collection Time: 09/26/22  9:34 PM   Result Value Ref Range    Sodium 136 136 - 145 mmol/L    Potassium 3.3 (L) 3.5 - 5.1 mmol/L    Chloride 103 97 - 108 mmol/L    CO2 25 21 - 32 mmol/L    Anion gap 8 5 - 15 mmol/L    Glucose 108 (H) 65 - 100 mg/dL    BUN 24 (H) 6 - 20 MG/DL    Creatinine 2.67 (H) 0.70 - 1.30 MG/DL    BUN/Creatinine ratio 9 (L) 12 - 20      GFR est AA 32 (L) >60 ml/min/1.73m2    GFR est non-AA 27 (L) >60 ml/min/1.73m2    Calcium 8.3 (L) 8.5 - 10.1 MG/DL   MAGNESIUM    Collection Time: 09/26/22  9:34 PM   Result Value Ref Range    Magnesium 2.2 1.6 - 2.4 mg/dL   HGB & HCT    Collection Time: 09/26/22  9:34 PM   Result Value Ref Range    HGB 9.3 (L) 12.1 - 17.0 g/dL    HCT 29.2 (L) 36.6 - 50.3 %   GLUCOSE, POC    Collection Time: 09/26/22  9:59 PM   Result Value Ref Range    Glucose (POC) 103 65 - 117 mg/dL    Performed by [address], POC    Collection Time: 09/26/22 11:02 PM   Result Value Ref Range    Glucose (POC) 128 (H) 65 - 117 mg/dL    Performed by [address], POC    Collection Time: 09/27/22 12:02 AM   Result Value Ref Range    Glucose (POC) 146 (H) 65 - 117 mg/dL    Performed by [address], POC    Collection Time: 09/27/22  1:09 AM   Result Value Ref Range    Glucose (POC) 216 (H) 65 - 117 mg/dL    Performed by Mauricio Ryan    GLUCOSE, POC    Collection Time: 09/27/22  2:07 AM   Result Value Ref Range    Glucose (POC) 188 (H) 65 - 117 mg/dL    Performed by Mauricio Ryan    METABOLIC PANEL, BASIC    Collection Time: 09/27/22  2:48 AM   Result Value Ref Range    Sodium 138 136 - 145 mmol/L    Potassium 3.3 (L) 3.5 - 5.1 mmol/L    Chloride 102 97 - 108 mmol/L    CO2 23 21 - 32 mmol/L    Anion gap 13 5 - 15 mmol/L    Glucose 188 (H) 65 - 100 mg/dL    BUN 26 (H) 6 - 20 MG/DL    Creatinine 2.98 (H) 0.70 - 1.30 MG/DL    BUN/Creatinine ratio 9 (L) 12 - 20      GFR est AA 29 (L) >60 ml/min/1.73m2    GFR est non-AA 24 (L) >60 ml/min/1.73m2    Calcium 8.4 (L) 8.5 - 10.1 MG/DL   MAGNESIUM    Collection Time: 09/27/22  2:48 AM   Result Value Ref Range    Magnesium 2.1 1.6 - 2.4 mg/dL   CBC WITH AUTOMATED DIFF    Collection Time: 09/27/22  2:48 AM   Result Value Ref Range    WBC 8.4 4.1 - 11.1 K/uL    RBC 3.62 (L) 4.10 - 5.70 M/uL    HGB 9.4 (L) 12.1 - 17.0 g/dL    HCT 30.1 (L) 36.6 - 50.3 %    MCV 83.1 80.0 - 99.0 FL    MCH 26.0 26.0 - 34.0 PG    MCHC 31.2 30.0 - 36.5 g/dL    RDW 14.7 (H) 11.5 - 14.5 %    PLATELET 063 346 - 518 K/uL    MPV 10.8 8.9 - 12.9 FL    NRBC 0.0 0  WBC    ABSOLUTE NRBC 0.00 0.00 - 0.01 K/uL    NEUTROPHILS 70 32 - 75 %    LYMPHOCYTES 19 12 - 49 %    MONOCYTES 9 5 - 13 %    EOSINOPHILS 1 0 - 7 %    BASOPHILS 1 0 - 1 %    IMMATURE GRANULOCYTES 0 0.0 - 0.5 %    ABS. NEUTROPHILS 5.9 1.8 - 8.0 K/UL    ABS. LYMPHOCYTES 1.6 0.8 - 3.5 K/UL    ABS. MONOCYTES 0.8 0.0 - 1.0 K/UL    ABS. EOSINOPHILS 0.1 0.0 - 0.4 K/UL    ABS. BASOPHILS 0.1 0.0 - 0.1 K/UL    ABS. IMM.  GRANS. 0.0 0.00 - 0.04 K/UL    DF AUTOMATED     GLUCOSE, POC    Collection Time: 09/27/22  3:05 AM   Result Value Ref Range    Glucose (POC) 180 (H) 65 - 117 mg/dL    Performed by [address], POC    Collection Time: 09/27/22  4:05 AM   Result Value Ref Range    Glucose (POC) 161 (H) 65 - 117 mg/dL    Performed by [address], POC    Collection Time: 09/27/22  5:14 AM   Result Value Ref Range    Glucose (POC) 148 (H) 65 - 117 mg/dL    Performed by [address], POC    Collection Time: 09/27/22  6:19 AM   Result Value Ref Range    Glucose (POC) 203 (H) 65 - 117 mg/dL    Performed by [address], POC    Collection Time: 09/27/22  7:23 AM   Result Value Ref Range    Glucose (POC) 244 (H) 65 - 117 mg/dL    Performed by Kem Crow RN    GLUCOSE, POC    Collection Time: 09/27/22  8:27 AM Result Value Ref Range    Glucose (POC) 180 (H) 65 - 117 mg/dL    Performed by [address], POC    Collection Time: 09/27/22  9:30 AM   Result Value Ref Range    Glucose (POC) 143 (H) 65 - 117 mg/dL    Performed by El Manning RN    GLUCOSE, POC    Collection Time: 09/27/22 10:32 AM   Result Value Ref Range    Glucose (POC) 120 (H) 65 - 117 mg/dL    Performed by Michael Meals \"Nory\"  PCT        Assessment:     Hospital Problems  Date Reviewed: 6/21/2022            Codes Class Noted POA    D[facility] (diabetic ketoacidosis) [facility] ICD-10-CM: E11.10  ICD-9-CM: 250.12  12/2/2021 Unknown           Plan:     1) DKA > Pt's BG is down to 120 at 11AM and received 20 units of lantus. I will restart his insulin pump and discontinue the Lantus and Humalog orders. Pt to bolus for meals and corrections based on his pump settings. I will adjust his basal settings some as it sounds like his BGs overnight and in the early morning tend to run higher. Basal  MN-8AM 0.65 > 0.75 units per hour  8AM-Noon 0.85 > 0.90 units per hour  Noon-5PM 1.5 units per hour  5PM-MN 1.4 units per hour  Carb Ratio  1:15  Correction Factor  1:50. I will continue to monitor his BGs while in the hospital with the plan of him going home with his insulin pump and following up with Dr. Geri Roldan. I spent 60 minutes on his case and > 50% of the time was spent reviewing the chart and coordinating his care with the nurse caring for him.       Signed By: Otilia Garland MD     September 27, 2022

## 2022-09-27 NOTE — PROGRESS NOTES
Pt's BG has increased up to the 300s off the insulin drip, despite the insulin pump running. He does not have any infusion sets so could not try a new site. I instructed pt to turn off his insulin pump and I will re-order the Lantus 20 units, his next dose will be tomorrow morning. I will also order Humalog 6 units with each meal plus correction scale.

## 2022-09-27 NOTE — PROGRESS NOTES
Nephrology Progress Note  [facility] / [address], [address]  [place], [address]  Phone - [phone]  Fax - [phone]                 Patient: Ruby Avendano                   YOB: 1980        Date- 9/27/2022                      Admit Date: 9/26/2022  CC: Follow up for ESRD          IMPRESSION & PLAN:   ESRD, [facility], Monday Wednesday Friday, right chest permacath, under Dr. Keron Diaz  DKA  Hyperkalemia  Anion gap acidosis  Non-anion gap metabolic acidosis  Type 1 diabetes uncontrolled  Hypertension  Anemia    PLAN-  No plans for HD today  We will plan for HD tomorrow and keep him on Monday Wednesday Friday schedule  Acidosis is now resolved. Further management of DKA per internal medicine team.     Subjective: Interval History:   -Patient was seen and examined today.  -Tolerated HD well yesterday without any ultrafiltration    Objective:   Vitals:    09/27/22 0252 09/27/22 0745 09/27/22 1151 09/27/22 1243   BP: (!) 159/73 (!) 149/72 (!) 152/69    Pulse: 100 99 91    Resp: 18 19 11    Temp: 98.6 °F (37 °C) 98.6 °F (37 °C) 98.4 °F (36.9 °C)    TempSrc:       SpO2: 96% 93% 93%    Weight:       Height:    5' 9\" (1.753 m)      09/26 0701 - 09/27 0700  In: -   Out: 400 [Urine:400]  Last 3 Recorded Weights in this Encounter    09/26/22 0830 09/26/22 0830 09/26/22 1519   Weight: 68 kg (150 lb) 68 kg (150 lb) 73.7 kg (162 lb 7.7 oz)        Physical exam:    GEN: No apparent distress  NECK- Supple, no mass  RESP: No wheezing, Clear b/l  CVS: Tachycardic  NEURO: Normal speech, Non focal  EXT: No Edema   HD access: Right chest permacath    Chart reviewed. Pertinent Notes reviewed.      Data Review :  Recent Labs     09/27/22  0248 09/26/22  2134 09/26/22  1415 09/26/22  0934 09/26/22  0805    136 136 126* 125*   K 3.3* 3.3* 3.1* 5.7* 5.2*    103 100 90* 88*   CO2 23 25 31 17* 19* BUN 26* 24* 20 64* 59*   CREA 2.98* 2.67* 1.77* 4.73* 4.73*   * 108* 209* 946* 920*   CA 8.4* 8.3* 8.1* 8.1* 8.6   MG 2.1 2.2 2.1 2.5* 2.6*   PHOS  --   --   --  6.7* 6.3*       Recent Labs     09/27/22  0248 09/26/22  2134 09/26/22  1415 09/26/22  0805   WBC 8.4  --   --  9.3   HGB 9.4* 9.3* 9.2* 10.0*   HCT 30.1* 29.2* 29.3* 33.0*     --   --  282       No results for input(s): FE, TIBC, PSAT, FERR in the last 72 hours.    Lab Results   Component Value Date/Time    Hemoglobin A1c 7.2 (H) 09/26/2022 09:34 AM    Hemoglobin A1c 8.7 (H) 08/12/2022 12:41 AM    Hemoglobin A1c 12.1 (H) 07/10/2022 07:51 PM        Lab Results   Component Value Date/Time    Microalbumin/Creat ratio (mg/g creat) 11,439 (H) 04/01/2021 10:00 AM    Microalbumin,urine random 151.00 04/01/2021 10:00 AM     Lab Results   Component Value Date/Time    NT pro-BNP 5,688 (H) 09/06/2022 09:16 AM    NT pro-BNP 11,477 (H) 08/16/2022 12:50 AM    NT pro-BNP 4,600 (H) 08/09/2022 02:13 PM    NT pro- (H) 07/06/2021 09:52 AM     US Results (most recent):  Medication list  reviewed  Current Facility-Administered Medications   Medication Dose Route Frequency    glucose chewable tablet 16 g  4 Tablet Oral PRN    glucagon (GLUCAGEN) injection 1 mg  1 mg IntraMUSCular PRN    dextrose 10% infusion 0-250 mL  0-250 mL IntraVENous PRN    sodium chloride (NS) flush 5-40 mL  5-40 mL IntraVENous Q8H    sodium chloride (NS) flush 5-40 mL  5-40 mL IntraVENous PRN    acetaminophen (TYLENOL) tablet 650 mg  650 mg Oral Q6H PRN    Or    acetaminophen (TYLENOL) suppository 650 mg  650 mg Rectal Q6H PRN    polyethylene glycol (MIRALAX) packet 17 g  17 g Oral DAILY PRN    ondansetron (ZOFRAN ODT) tablet 4 mg  4 mg Oral Q8H PRN    Or    ondansetron (ZOFRAN) injection 4 mg  4 mg IntraVENous Q6H PRN    [Held by provider] heparin (porcine) injection 5,000 Units  5,000 Units SubCUTAneous BID    pantoprazole (PROTONIX) 40 mg in 0.9% sodium chloride 10 mL injection 40 mg IntraVENous Q12H    carvediloL (COREG) tablet 12.5 mg  12.5 mg Oral BID WITH MEALS    finasteride (PROSCAR) tablet 5 mg  5 mg Oral DAILY    pregabalin (LYRICA) capsule 75 mg  75 mg Oral BID    rosuvastatin (CRESTOR) tablet 10 mg  10 mg Oral QHS    tamsulosin (FLOMAX) capsule 0.4 mg  0.4 mg Oral DAILY    metoclopramide HCl (REGLAN) injection 10 mg  10 mg IntraVENous Q8H    ampicillin-sulbactam (UNASYN) 3 g in 0.9% sodium chloride (MBP/ADV) 100 mL MBP  3 g IntraVENous Q12H    hydrALAZINE (APRESOLINE) 20 mg/mL injection 10 mg  10 mg IntraVENous Q6H PRN        Devin Gonzalez MD  9/27/2022

## 2022-09-27 NOTE — PROGRESS NOTES
Pt on insulin gtt see Mar for changes    2134 BNP/mag and H&H sent to lab    2300 Called lab to ask why BMP hasn't resulted.  stated that it was never processed and that it should be resulted in the next 7 mins. 2340 Notified CHARLY Amin:Pt BP is currently 172/78. He has been trending systolically in the 744'N. He has no prn BP meds. What do you recommend? NP placed order for Hydralazine PRN Q6    2350 BMP has not resulted yet. Called lab again to check on the status of the BMP.  stated that it should be resulted in the next minute. NP Janet Torres made aware of pt K 3.3. No new orders at this time    0212 Notified CHARLY Amin: Pt has a hansen from home with a leg bag. He was recently D/c with it on 9/12 for retention. No order was placed. What do you recommend? NP placed order to change the hansen and continue    0302 Hansen removed catheter intact. No c/o of pain. 0429 Pt off floor for PA LAT. This RN accompanied pt to imaging    0448 Pt back to floor       End of Shift Note    Bedside shift change report given to Ayla Mitchell RN (oncoming nurse) by Marcell Cowart (offgoing nurse).   Report included the following information SBAR, Kardex, ED Summary, Intake/Output, MAR, and Recent Results    Shift worked:  [id number]     Shift summary and any significant changes:     See above note     Concerns for physician to address:       Zone phone for oncoming shift:                Marcell Cowart

## 2022-09-27 NOTE — PROGRESS NOTES
Comprehensive Nutrition Assessment    Type and Reason for Visit: Initial, Wound    Nutrition Recommendations/Plan:   Advance to 4 carb choice diet as tolerated      Nutrition Assessment:    Patient medically noted for DKA and pneumonia. PMH HTN, DM, and ESRD on HD. Patient advanced to a full liquid diet this morning; hadn't had a meal yet. States he is hungry. Typically has a good appetite; eats well at home. Denies any significant weight changes recently but reports a UBW ~140#. Large fluctuations in weight noted per chart review. Does not drink supplements at home but gets a protein bar at dialysis. No nutrition questions or concerns reported. Encouraged intake of meals. Advance diet as tolerated. PI referral received; discussed with RN. Unsure of etiology - abrasion and stage 3 documented for same wound. Patient picks at it per RN. Discussed possible wound care consult. Wt Readings from Last 10 Encounters:   09/26/22 73.7 kg (162 lb 7.7 oz)   09/13/22 72.1 kg (159 lb)   09/06/22 81.6 kg (180 lb)   08/25/22 83.2 kg (183 lb 6.4 oz)   08/17/22 91.4 kg (201 lb 8 oz)   08/09/22 82.7 kg (182 lb 5.1 oz)   07/14/22 73.9 kg (163 lb)   07/10/22 63.5 kg (140 lb)   06/25/22 71.6 kg (157 lb 13.6 oz)   06/21/22 66.5 kg (146 lb 9.6 oz)     Malnutrition Assessment:  Malnutrition Status:  No malnutrition (09/27/22 1247)      Nutrition Related Findings:    Na 138, K+ 3.3, -[phone]   humalog, Reglan, Coreg, protonix, Rosuvastatin   Wound Type:  (head wound, unsure of etiology (abrasion vs Pressure?))    Current Nutrition Intake & Therapies:        ADULT DIET Full Liquid; 4 carb choices (60 gm/meal)    Anthropometric Measures:  Height: 5' 9\" (175.3 cm)  Ideal Body Weight (IBW): 160 lbs (73 kg)     Current Body Wt:  73.7 kg (162 lb 7.7 oz), 101.5 % IBW.     Current BMI (kg/m2): 24  Usual Body Weight: 63.5 kg (140 lb)  % Weight Change (Calculated): 16.1                    BMI Category: Normal weight (BMI 18.5-24. 9)    Estimated Daily Nutrient Needs:  Energy Requirements Based On: Formula  Weight Used for Energy Requirements: Current  Energy (kcal/day): 2139 kcals (BMR 1645 x 1. 3AF)  Weight Used for Protein Requirements: Current  Protein (g/day): 89-104g (1.2-1.4 g/kg bw)  Method Used for Fluid Requirements: Standard renal  Fluid (ml/day): 1800 mL or per MD    Nutrition Diagnosis:   Altered nutrition-related lab values related to  (DM) as evidenced by  ()    Nutrition Interventions:   Food and/or Nutrient Delivery: Modify current diet  Nutrition Education/Counseling: No recommendations at this time  Coordination of Nutrition Care: Continue to monitor while inpatient       Goals:     Goals:  (PO intake >70% of meals and BG tren <180 next 5-7 days)       Nutrition Monitoring and Evaluation:   Behavioral-Environmental Outcomes: None identified  Food/Nutrient Intake Outcomes: Food and nutrient intake  Physical Signs/Symptoms Outcomes: Biochemical data, Weight    Discharge Planning:     (4 carb choice diet)    Sandra Elkins RD  Contact: ext [phone]

## 2022-09-28 ENCOUNTER — TELEPHONE (OUTPATIENT)
Dept: ENDOCRINOLOGY | Age: 42
End: 2022-09-28

## 2022-09-28 VITALS
RESPIRATION RATE: 18 BRPM | OXYGEN SATURATION: 96 % | HEART RATE: 89 BPM | DIASTOLIC BLOOD PRESSURE: 82 MMHG | WEIGHT: 167.2 LBS | TEMPERATURE: 98.2 F | HEIGHT: 69 IN | BODY MASS INDEX: 24.76 KG/M2 | SYSTOLIC BLOOD PRESSURE: 171 MMHG

## 2022-09-28 LAB
ANION GAP SERPL CALC-SCNC: 10 MMOL/L (ref 5–15)
ATRIAL RATE: 89 BPM
BUN SERPL-MCNC: 31 MG/DL (ref 6–20)
BUN/CREAT SERPL: 9 (ref 12–20)
CALCIUM SERPL-MCNC: 8.5 MG/DL (ref 8.5–10.1)
CALCULATED P AXIS, ECG09: 56 DEGREES
CALCULATED R AXIS, ECG10: 48 DEGREES
CALCULATED T AXIS, ECG11: 49 DEGREES
CHLORIDE SERPL-SCNC: 100 MMOL/L (ref 97–108)
CO2 SERPL-SCNC: 22 MMOL/L (ref 21–32)
CREAT SERPL-MCNC: 3.51 MG/DL (ref 0.7–1.3)
DIAGNOSIS, 93000: NORMAL
GLUCOSE BLD STRIP.AUTO-MCNC: 114 MG/DL (ref 65–117)
GLUCOSE BLD STRIP.AUTO-MCNC: 153 MG/DL (ref 65–117)
GLUCOSE BLD STRIP.AUTO-MCNC: 44 MG/DL (ref 65–117)
GLUCOSE BLD STRIP.AUTO-MCNC: 45 MG/DL (ref 65–117)
GLUCOSE BLD STRIP.AUTO-MCNC: 75 MG/DL (ref 65–117)
GLUCOSE SERPL-MCNC: 167 MG/DL (ref 65–100)
P-R INTERVAL, ECG05: 176 MS
POTASSIUM SERPL-SCNC: 4.3 MMOL/L (ref 3.5–5.1)
Q-T INTERVAL, ECG07: 402 MS
QRS DURATION, ECG06: 106 MS
QTC CALCULATION (BEZET), ECG08: 489 MS
SERVICE CMNT-IMP: ABNORMAL
SERVICE CMNT-IMP: NORMAL
SERVICE CMNT-IMP: NORMAL
SODIUM SERPL-SCNC: 132 MMOL/L (ref 136–145)
VENTRICULAR RATE, ECG03: 89 BPM

## 2022-09-28 PROCEDURE — 80048 BASIC METABOLIC PNL TOTAL CA: CPT

## 2022-09-28 PROCEDURE — 36415 COLL VENOUS BLD VENIPUNCTURE: CPT

## 2022-09-28 PROCEDURE — 99232 SBSQ HOSP IP/OBS MODERATE 35: CPT | Performed by: INTERNAL MEDICINE

## 2022-09-28 PROCEDURE — 74011250637 HC RX REV CODE- 250/637: Performed by: INTERNAL MEDICINE

## 2022-09-28 PROCEDURE — 74011636637 HC RX REV CODE- 636/637: Performed by: INTERNAL MEDICINE

## 2022-09-28 PROCEDURE — C9113 INJ PANTOPRAZOLE SODIUM, VIA: HCPCS | Performed by: HOSPITALIST

## 2022-09-28 PROCEDURE — 90935 HEMODIALYSIS ONE EVALUATION: CPT

## 2022-09-28 PROCEDURE — 74011250637 HC RX REV CODE- 250/637: Performed by: HOSPITALIST

## 2022-09-28 PROCEDURE — 82962 GLUCOSE BLOOD TEST: CPT

## 2022-09-28 PROCEDURE — 74011250636 HC RX REV CODE- 250/636: Performed by: INTERNAL MEDICINE

## 2022-09-28 PROCEDURE — 74011250636 HC RX REV CODE- 250/636: Performed by: HOSPITALIST

## 2022-09-28 PROCEDURE — 74011000250 HC RX REV CODE- 250: Performed by: HOSPITALIST

## 2022-09-28 RX ADMIN — CARVEDILOL 12.5 MG: 12.5 TABLET, FILM COATED ORAL at 17:00

## 2022-09-28 RX ADMIN — PREGABALIN 75 MG: 75 CAPSULE ORAL at 08:40

## 2022-09-28 RX ADMIN — HEPARIN SODIUM 1800 UNITS: 1000 INJECTION INTRAVENOUS; SUBCUTANEOUS at 13:50

## 2022-09-28 RX ADMIN — INSULIN GLARGINE 20 UNITS: 100 INJECTION, SOLUTION SUBCUTANEOUS at 08:37

## 2022-09-28 RX ADMIN — TAMSULOSIN HYDROCHLORIDE 0.4 MG: 0.4 CAPSULE ORAL at 08:40

## 2022-09-28 RX ADMIN — PREGABALIN 75 MG: 75 CAPSULE ORAL at 17:22

## 2022-09-28 RX ADMIN — SODIUM CHLORIDE, PRESERVATIVE FREE 10 ML: 5 INJECTION INTRAVENOUS at 06:30

## 2022-09-28 RX ADMIN — SODIUM CHLORIDE, PRESERVATIVE FREE 10 ML: 5 INJECTION INTRAVENOUS at 14:00

## 2022-09-28 RX ADMIN — HEPARIN SODIUM 5000 UNITS: 5000 INJECTION INTRAVENOUS; SUBCUTANEOUS at 08:40

## 2022-09-28 RX ADMIN — HEPARIN SODIUM 5000 UNITS: 5000 INJECTION INTRAVENOUS; SUBCUTANEOUS at 17:23

## 2022-09-28 RX ADMIN — AMLODIPINE BESYLATE 10 MG: 5 TABLET ORAL at 08:40

## 2022-09-28 RX ADMIN — SODIUM CHLORIDE, PRESERVATIVE FREE 40 MG: 5 INJECTION INTRAVENOUS at 08:40

## 2022-09-28 RX ADMIN — CARVEDILOL 12.5 MG: 12.5 TABLET, FILM COATED ORAL at 08:40

## 2022-09-28 RX ADMIN — Medication 6 UNITS: at 08:37

## 2022-09-28 RX ADMIN — FINASTERIDE 5 MG: 5 TABLET, FILM COATED ORAL at 08:40

## 2022-09-28 NOTE — PROGRESS NOTES
[address] follow-up ENDO transitional care appointment has been scheduled with Dr. Malu Fenton on 10/28/22 at 1030. This is a previously scheduled appt. Pending patient discharge. [place] Management Assistant Kelli Fletcher. follow-up PCP transitional care appointment has been scheduled with Dr. Armond Fagan. Mckinley on 10/4/22 at 0900. Pending patient discharge. [place] Management Assistant     Attempted to schedule hospital follow up PCP appointment. Sent a message to PCP office to find patient an appointment. Awaiting callback from PCP office.  Lona Nelson

## 2022-09-28 NOTE — TELEPHONE ENCOUNTER
Spoke with pt to make him aware that he will be notified via txt of his PA determination within  the next 24 hrs. He voiced understanding and said ok. He then stated that he is being d/c from the hospital within the next couple of hours as well.

## 2022-09-28 NOTE — PROCEDURES
Hemodialysis / [phone]    Vitals Pre Post Assessment Pre Post   BP BP: (!) 152/104 (09/28/22 1010)   166/98 LOC A&Ox4 No change   HR Pulse (Heart Rate): 88 (09/28/22 1010) 86 Lungs clear No change   Resp Resp Rate: 18 (09/28/22 1010) 18 Cardiac RRR No change   Temp Temp: 98.5 °F (36.9 °C) (09/28/22 1010) 97.8 Skin CDI / tattoos No change   Weight    Edema None noted No change   Tele status   Pain Pain Intensity 1: 0 (09/28/22 0732)      Orders   Duration: Start: 1010 End: 1346 Total: 3.5hrs   Dialyzer: Dialyzer/Set Up Inspection: Annmarie Quintero (09/28/22 1010)   K Bath: Dialysate K (mEq/L): 3 (09/28/22 1010)   Ca Bath: Dialysate CA (mEq/L): 2.5 (09/28/22 1010)   Na: Dialysate NA (mEq/L): 138 (09/28/22 1010)   Bicarb: Dialysate HCO3 (mEq/L): 35 (09/28/22 1010)   Target Fluid Removal: Goal/Amount of Fluid to Remove (mL): 1000 mL (09/28/22 1010)     Access   Type & Location: R PC : Dressing changed, slight drainage noticed at exit site, no redness noted. Pt denies pain at site. Both lumens aspirate & flush well. Running well at . SBAR received from Primary RN. Pt arrived to HD suite A&Ox4. Consent signed & on file. Each catheter limb disinfected per p&p, caps removed, hubs disinfected per p&p. Each lumen aspirated for blood return and flushed with Normal Saline per policy. VSS. Dialysis Tx initiated. Comments:   Dressing changed. Slight drainage from exit site, no redness noted. Pt denies any pain at site.                                     Labs   HBsAg (Antigen) / date: Neg 9/7/22                                              HBsAb (Antibody) / date: Susc 9/7/22   Source: EPIC   Obtained/Reviewed  Critical Results Called HGB   Date Value Ref Range Status   09/27/2022 9.4 (L) 12.1 - 17.0 g/dL Final     Potassium   Date Value Ref Range Status   09/28/2022 4.3 3.5 - 5.1 mmol/L Final     Comment:     INVESTIGATED PER DELTA CHECK PROTOCOL     Calcium   Date Value Ref Range Status   09/28/2022 8.5 8.5 - 10.1 MG/DL Final     BUN   Date Value Ref Range Status   09/28/2022 31 (H) 6 - 20 MG/DL Final     Creatinine   Date Value Ref Range Status   09/28/2022 3.51 (H) 0.70 - 1.30 MG/DL Final        Meds Given   Name Dose Route   Heparin 1:1000 1.8 [email or web address]   Heparin 1:1000 1.8 [email or web address]          Adequacy / Fluid    Total Liters Process: 78.0   Net Fluid Removed: 1000mL      Comments   Time Out Done:   (Time) 1005   Admitting Diagnosis: DKA   Consent obtained/signed: Informed Consent Verified: Yes (09/28/22 1010)   Machine / RO # Machine Number: [id number] Rapp (09/28/22 1010)   Primary Nurse Rpt Pre: Daniel Lance RN   Primary Nurse Rpt Post: Daniel Lance RN   Pt Education: Importance of keeping CVC dressing dry   Care Plan: On going   [facility]     Tx Summary  1010:  R PC : Dressing changed. Slight drainage noted from exit site, no redness noted. Pt denies any pain at site. Both lumens aspirate & flush well. Running well at . SBAR received from Primary RN. Pt arrived to HD suite A&Ox4. Consent signed & on file. Each catheter limb disinfected per p&p, caps removed, hubs disinfected per p&p. Each lumen aspirated for blood return and flushed with Normal Saline per policy. VSS. Dialysis Tx initiated. 1015:  Pt resting  1030:  Pt resting  1045:  Pt resting  1100:  Pt resting  1115:  Pt resting  1130:  Pt resting  1145:  Pt resting  1200:  Pt resting  1215:  Pt resting  1230:  Pt resting  1245:  Pt resting  1300:  Pt resting  1315:  Pt resting  1330:  Pt resting  1346:  Tx ended. VSS. Each dialysis catheter limb disinfected per p&p, all possible blood returned per p&p, and each dialysis hub disinfected per p&p. Each lumen flushed, post dialysis catheter Heparin dwell instilled per order, and caps applied. Bed locked and in the lowest position, call bell and belongings in reach. SBAR given to Primary, RN. Patient is stable at time of their/ my departure. All Dialysis related medications have been reviewed. Comments:   Assessment performed by RN. Procedure and documentation observed and reviewed by Cheryl Costa RN.

## 2022-09-28 NOTE — PROGRESS NOTES
End of Shift Note    Bedside shift change report given to ASAD Francis (oncoming nurse) by Dalton Garcia RN (offgoing nurse). Report included the following information SBAR, Kardex, MAR, and Recent Results    Shift worked:  [id number]     Shift summary and any significant changes:     No significant changes. Concerns for physician to address:       Zone phone for oncoming shift:          Activity:  Activity Level: Up with Assistance  Number times ambulated in hallways past shift: 0  Number of times OOB to chair past shift: 0    Cardiac:   Cardiac Monitoring: Yes      Cardiac Rhythm: Sinus Rhythm    Access:  Current line(s): PIV     Genitourinary:   Urinary status: voiding    Respiratory:   O2 Device: None (Room air)  Chronic home O2 use?: NO  Incentive spirometer at bedside: N/A       GI:     Current diet:  ADULT DIET Regular; 4 carb choices (60 gm/meal); Low Fat/Low Chol/High Fiber/2 gm Na  Passing flatus: YES  Tolerating current diet: YES       Pain Management:   Patient states pain is manageable on current regimen: YES    Skin:  Corey Score: 18  Interventions: increase time out of bed    Patient Safety:  Fall Score:  Total Score: 3  Interventions: pt to call before getting OOB  High Fall Risk: Yes    Length of Stay:  Expected LOS: 3d 19h  Actual LOS: 2      Dalton Garcia RN

## 2022-09-28 NOTE — TELEPHONE ENCOUNTER
9/28/2022    Khadijah Hill (Pt's Mother) called and stated the pt is currently in the hospital and the hospital wont release him until his Dexcom G6 is ready for . She contacted the pharmacy and the pharmacy adv the insurance company denied the RX and the pt couldn't get another one. She stated the nurse can call the pt at [phone].     Thanks, Tariq Yang

## 2022-09-28 NOTE — PROGRESS NOTES
Transition of Care Plan:Patient's mother is in the room with him and will transport him home when discharged. RUR:  35%    Disposition: Home with family    Follow up appointments:Placed on AVS    DME needed: None    Transportation at Discharge: Mother    [address] or means to access home:  Mother has keys        IM Medicare Letter:N/A--Patient has medicaid    Is patient a  and connected with the [place]? No                If yes, was [employer] transfer form completed and [facility] notified? N/A    Caregiver Contact: Mother    Discharge Caregiver contacted prior to discharge? Mother is in the room with the patient at this time. Care Conference needed?:  No      Patient has returned from having dialysis. Spoke with his dialysis unit and faxed them his medicals and run sheet to [phone] with confirmation. Patient aware to follow up on Friday with his scheduled dialysis at his usual time if discharging today. He will follow up      Lashonda JOHNSONN [employer]        [phone]

## 2022-09-28 NOTE — PROGRESS NOTES
Answered patients call bell, patient complaining of feeling like his blood sugar is low. Checked patients blood sugar, 45, repeat 44. Primary nurse Ti notified, orange juice given to patient. Primary nurse Ti to repeat blood sugar per protocol.

## 2022-09-28 NOTE — PROGRESS NOTES
1615 hansen catheter discontinue by Dr Niecy Pollard order will discharge patient after he void his own

## 2022-09-28 NOTE — PROGRESS NOTES
Progress Note    Patient: Emily Hopper MRN: [id number]  SSN: [id number]    YOB: 1980  Age: 46 y.o. Sex: male      Admit Date: 9/26/2022    LOS: 2 days     Subjective:     No acute events overnight. Pt without complaints this afternoon. Reviewing his BG readings his sugars have improved last night his BG improved from the 300s down to 186 at bedtime. This AM his BG was 152 before breakfast.  Pt is eating well and tolerating PO. Objective:     Vitals:    09/28/22 1200 09/28/22 1215 09/28/22 1230 09/28/22 1245   BP: (!) 167/98 (!) 184/94 (!) 159/98 (!) 162/93   Pulse: 89 87 86 88   Resp: 18 18 18 18   Temp:       TempSrc:       SpO2:       Weight:       Height:            Intake and Output:  Current Shift: 09/28 0701 - 09/28 1900  In: 240 [P.O.:240]  Out: 750 [Urine:750]  Last three shifts: 09/26 1901 - 09/28 0700  In: 540 [P.O.:540]  Out: 1600 [Urine:1600]    Physical Exam:   GENERAL: alert, cooperative, no distress, appears stated age  EYE: negative  THROAT & NECK: normal and no erythema or exudates noted. ABDOMEN: soft, non-tender. Bowel sounds normal. No masses,  no organomegaly  EXTREMITIES:  extremities normal, atraumatic, no cyanosis or edema  SKIN: Normal.    Lab/Data Review: All lab results for the last 24 hours reviewed. Assessment:     Active Problems:    DKA (diabetic ketoacidosis) ([facility] [address] (12/2/2021)        Plan:     1) DKA > His BGs have improved on the Lantus 20 units daily and Humalog 6 units with meals. While pt is in the hospital I recommend continuing this current regimen. He can restart his insulin pump once he is ready for discharge and follow up with Dr. John Carter as an OP. I spent 30 minutes on his case and > 50% of the time was spent reviewing the chart and coordinating his care with the nurse caring for him.       Signed By: Freya Goodell, MD     September 28, 2022

## 2022-09-28 NOTE — DISCHARGE SUMMARY
Hospitalist Discharge Note    NAME: Bethany Villa   :  1980   MRN:  [id number]     Admit date: 2022    Discharge date: 22    PCP: Shandra Lazcano MD    Discharge Diagnoses:    DKA POA likely with pump malfunction, HgBa1c 7.2    Insulin dependent DM type 1 POA on home insulin pump     ESRD POA on HD --    Hyperkalemia K 5.7 POA resolved with HD     Hypertonic hyponatremia POA due to hyperglycemia     Intractable N/V POA likely from DKA    Severe esophageal/gastric inflammation POA     Left Lung infiltrate at admit, resolved on repeat CXR     Essential HTN POA    Recent urinary retention from prior admit POA hansen removed    ? BPH with atonic bladder      Code status: Full      Discharge Medications:    Resuming home insulin pump, started before discharge from the hospital    Current Discharge Medication List        CONTINUE these medications which have NOT CHANGED    Details   naloxone (Narcan) 4 mg/actuation nasal spray Use 1 spray intranasally, then discard. Repeat with new spray every 2 min as needed for opioid overdose symptoms, alternating nostrils. Qty: 1 Each, Refills: 0    Associated Diagnoses: Chronic midline low back pain without sciatica      rosuvastatin (CRESTOR) 10 mg tablet Take 1 Tablet by mouth nightly for 60 days. Qty: 30 Tablet, Refills: 1      bacitracin zinc (BACITRACIN) ointment Apply  to affected area two (2) times a day. Qty: 28 g, Refills: 4    Associated Diagnoses: Abrasion of scalp, initial encounter      Ketone Blood Test strp 50 Each by Does Not Apply route as needed for PRN Reason (Other) (as needed for suspected dka). Qty: 50 Strip, Refills: 3      pantoprazole (PROTONIX) 40 mg tablet Take 1 Tablet by mouth Daily (before breakfast). Qty: 30 Tablet, Refills: 0      finasteride (PROSCAR) 5 mg tablet Take 1 Tablet by mouth in the morning.   Qty: 30 Tablet, Refills: 2      insulin aspart U-100 (NovoLOG U-100 Insulin aspart) 100 unit/mL injection Use as instructed via insulin pump. Dx code E10.65 max daily dose 50U  Qty: 30 mL, Refills: 2      pregabalin (Lyrica) 75 mg capsule Take 1 Capsule by mouth two (2) times a day. Max Daily Amount: 150 mg.  Qty: 60 Capsule, Refills: 2    Associated Diagnoses: Other diabetic neurological complication associated with type 2 diabetes mellitus ([facility] [facility] Recurrent falls      Walker (Ultra-Light Rollator) misc Use while ambulating  Qty: 1 Each, Refills: 0    Associated Diagnoses: DM type 2, goal HbA[facility]c < 7% [facility] Recurrent falls      amLODIPine (NORVASC) 10 mg tablet Take 1 Tablet by mouth daily. Qty: 90 Tablet, Refills: 1    Associated Diagnoses: Primary hypertension      cloNIDine (CATAPRES) 0.1 mg/24 hr ptwk 1 Patch by TransDERmal route every seven (7) days. Qty: 12 Patch, Refills: 1    Associated Diagnoses: Primary hypertension      Dexcom G6  misc Use with the dexcom device to check blood sugars 4 times a day  Qty: 1 Each, Refills: 0    Comments: [phone] dx code E10.65      Dexcom G6 Sensor brandi Use as directed every 10 days  Qty: 10 Each, Refills: 11    Comments: [phone] dx code E10.65      Dexcom G6 Transmitter brandi Use as directed  Qty: 10 Each, Refills: 3    Comments: [phone] dx code E10.65      Insulin Needles, Disposable, 31 gauge x 5/16\" ndle Dx code E11.65, use 6x daily  Qty: 200 Each, Refills: 11      carvediloL (COREG) 12.5 mg tablet Take 1 Tablet by mouth two (2) times daily (with meals). Qty: 60 Tablet, Refills: 1      tamsulosin (FLOMAX) 0.4 mg capsule Take 0.4 mg by mouth daily.            STOP taking these medications       acetaminophen-codeine (Tylenol-Codeine #3) 300-30 mg per tablet Comments:   Reason for Stopping:         insulin glargine (LANTUS) 100 unit/mL injection Comments:   Reason for Stopping:         insulin glargine (Lantus Solostar U-100 Insulin) 100 unit/mL (3 mL) inpn Comments:   Reason for Stopping:         FreeStyle Connor 14 Day Sensor kit Comments:   Reason for Stopping:         insulin aspart U-100 (NovoLOG Flexpen U-100 Insulin) 100 unit/mL (3 mL) inpn Comments:   Reason for Stopping:         pen needle, diabetic 30 gauge x 3/16\" ndle Comments:   Reason for Stopping: Follow-up Information       Follow up With Specialties Details Why Chalino Gee MD Internal Medicine Physician Go on 10/4/2022 at 9:00am for your [facility] hospital follow up. [address] [place] [address]  Follow up You are to resume your regular schedule dialysis when discharged. If you have any questions call the above number to your dialysis unit. Eyal Khan MD Internal Medicine Physician, Endocrinology Physician Go on 10/28/2022 at 10:30am for your ENDOCRINOLOGY hospital follow up. [address] [facility] Division Of Urology  Follow up keep your scheduled appointment in early october 2022 [address]  [phone]            Time spent on discharge:   I spent greater than 30 minutes on discharge, seeing and examining the patient, reconciling home meds and new meds, coordinating care with case management, doing the discharge papers and the D/C summary    Discharge disposition: home    Discharge Condition: Stable    Summary of admission H+P(copied from Dr Dennie Ask Note):     CHIEF COMPLAINT: Nausea vomiting/high glucose     HISTORY OF PRESENT ILLNESS:     Mr. Yuni Gould is a 46 y.o.   M who was discharged from the hospital less than 2 weeks ago, patient recently started on dialysis Monday Wednesday Friday, last dialyzed on Friday. Also recently been placed on insulin pump. Patient woke up this morning he was not feeling well with nausea/vomiting. His Dexcom glucose reading device was not functioning. His sugars were noted to be elevated, he presented to emergency room.   He was having some persistent nausea/vomiting. His labs showed elevated anion gap, elevated bicarb, chest x-ray showed left lung infiltrate. COVID test, procalcitonin, lipase, beta hydroxybutyrate with pending. LFTs were normal.  He was started on insulin drip as per protocol. His sugars still remain elevated at 600. He continues to have nausea vomiting. He is being admitted to hospital for further care. Patient denies any abdominal pain, he had diarrhea earlier today. He had no chest pain, no cough, no shortness of breath, no fever, no chills, no rectal bleed, no headache, no blurred vision, no dysuria, no hematuria. He has a previous Beard catheter in place and is supposed to follow-up with his urologist sometime in the next 2 weeks. He was noted to have severe gastritis and early admissions with some GI bleeds. He supposedly intolerant to heparin/Lovenox. Past Medical History:   Diagnosis Date    Bipolar 1 disorder, depressed ([facility]      Bipolar disorder ([address]      Chronic kidney disease, stage 3a ([address]      Depression      Diabetes ([address]      DKA, type 1 ([address] 1/27/2013     diagnosed age 23    DKA, type 1 ([address]      H/O noncompliance with medical treatment, presenting hazards to health      MRSA (methicillin resistant staph aureus) culture positive      MRSA (methicillin resistant Staphylococcus aureus)       Face    Noncompliance with medication regimen      Second hand smoke exposure      Seizure ([address]      Seizures ([address] 2006 or 2007     one episode during long-term     Admit pCXR read by radiology   Cardiomediastinal silhouette is stable. Dual-lumen central venous catheter  extends to the right atrium. There is left basilar patchy airspace disease and a  small left pleural effusion. Right lung is clear. There is no pneumothorax. IMPRESSION  Left basilar patchy airspace disease and small left pleural effusion.      Repeat CXR 9/27/2022 read by radiology   TECHNIQUE: PA and lateral chest views  FINDINGS: The right IJ catheter is stable. The cardiomediastinal contours are  stable. The pulmonary vasculature is within normal limits. Left basilar opacity and small left pleural effusion have resolved. There is an  increased trace right pleural effusion. There is no pneumothorax. The bones and  upper abdomen are stable. IMPRESSION  Resolved left basilar opacity and small left pleural effusion. Increased trace  right pleural effusion.      Hospital course:      DKA POA likely with pump malfunction, HgBa1c 7.2  Insulin dependent DM type 1 POA on home insulin pump  Admitted with , HCO3 18 with AG 18  Insulin pump at home, pt claims pump not malfunctioning   Tubing became dislodged or kinked while sleeping  Admit started on insulin drip per Glucostabilizer protocol   Glycemic control improved and AG normalized  Transitioned to 20 units Lantus before stopping insulin drip  Held IVF as ESRD  Diabetic teaching  IP Endocrinology consult while in the hospital, Saw Dr Cresencio Patricia the Insulin pump today with setting changes  Repeat A1c= 7.2  Po diet as tolerated - diabetic restrictions     ESRD POA  Hyperkalemia K 5.7 POA resolved with HD  Recently started on HD, MWF on 9/7/2022  Nephrology consulted for dialysis needs  Not fluid overloaded     Hypertonic hyponatremia POA due to hyperglycemia  Na 126 with glucose 946   Corrects to 143 with the hyperglycemia     Intractable N/V POA likely from DKA, abdominal exam benign  Normal lipase lipase,  Resolved now, advance PO diet as able today     Severe esophageal/gastric inflammation POA  Seen on previous EGD  IV PPI     Left Lung infiltrate at admit  Resolved next day on PA/lat CXR  Antibiotics stopped   Blood cultures negative     Essential HTN POA  Resume Amlodipine, clonidine  Cont coreg    Recent urinary retention from prior admit POA hansen removed  Hansen in place since 8/11/2022    Has not been able to follow up with urology as outpatient  Unable to remove during admit in early sept 2022   Urology concerned for ?? BPH and atonic bladder per consult  Flomax  Still makes significant urine  Not able to follow up with [place] u[facility] as insurance not covered   Has an appointment at [facility] urology next week  Pt requested hansen to be removed   D/w patient it might have to be replaced now or in near future  Options d/w him and mother at bedside   1. Leave in place till [place] urology follow up   2. Remove and see how he does with risk of replacement above  He really wanted it removed  Removed hansen before discharge  D/W him to be sure to keep the urology follow up      Code status: Full  Prophylaxis: Hep SQ  Recommended Disposition: Home w/Family    Subjective:     Chief Complaint / Reason for Physician Visit: F/U Nausea/Vomiting, DKA, ESRD on HD  \"I feel good\". Discussed with RN events overnight. BS dropped after HD, was not able to eat during HD  Improved now  Asking to get hansen out, removed before discharge    Review of Systems:  Symptom Y/N Comments  Symptom Y/N Comments   Fever/Chills n   Chest Pain n    Poor Appetite n   Edema n    Cough n   Abdominal Pain n    Sputum n   Joint Pain     SOB/JOHNSTON n   Pruritis/Rash     Nausea/vomit n resolved  Tolerating PT/OT y    Diarrhea    Tolerating Diet y    Constipation    Other       Could NOT obtain due to:      Objective:     VITALS:   Last 24hrs VS reviewed since prior progress note.  Most recent are:  Patient Vitals for the past 24 hrs:   Temp Pulse Resp BP SpO2   09/28/22 1440 98.2 °F (36.8 °C) 89 18 (!) 171/82 96 %   09/28/22 1439 -- 90 18 (!) 171/82 96 %   09/28/22 1346 97.8 °F (36.6 °C) 86 18 (!) 166/98 --   09/28/22 1330 -- 86 18 (!) 159/96 --   09/28/22 1315 -- 88 18 (!) 167/96 --   09/28/22 1300 -- 88 18 (!) 170/96 --   09/28/22 1245 -- 88 18 (!) 162/93 --   09/28/22 1230 -- 86 18 (!) 159/98 --   09/28/22 1215 -- 87 18 (!) 184/94 --   09/28/22 1200 -- 89 18 (!) 167/98 --   09/28/22 1145 -- 88 18 (!) 154/91 --   09/28/22 1130 -- 87 18 (!) 157/97 --   09/28/22 1115 -- 86 18 (!) 166/90 --   09/28/22 1100 -- 87 18 (!) 156/86 --   09/28/22 1045 -- 85 18 126/69 --   09/28/22 1030 -- 85 18 (!) 146/88 --   09/28/22 1015 -- 87 18 (!) 158/96 --   09/28/22 1010 98.5 °F (36.9 °C) 88 18 (!) 152/104 --   09/28/22 0732 97.9 °F (36.6 °C) 95 16 (!) 169/92 96 %   09/28/22 0312 98 °F (36.7 °C) 91 18 (!) 166/90 94 %   09/27/22 2330 98.6 °F (37 °C) 90 17 (!) 158/82 96 %   09/27/22 1912 98.4 °F (36.9 °C) 94 20 (!) 144/76 98 %         Intake/Output Summary (Last 24 hours) at 9/28/2022 1651  Last data filed at 9/28/2022 1346  Gross per 24 hour   Intake 780 ml   Output 3050 ml   Net -2270 ml          I had a face to face encounter and independently examined this patient on 9/28/2022, as outlined below:  PHYSICAL EXAM:  General: WD, WN. Alert, cooperative, no acute distress    EENT:  EOMI. Anicteric sclerae. MMM  Resp:  CTA bilaterally, no wheezing or rales. No accessory muscle use  CV:  Regular  rhythm,  No edema  GI:  Soft, Non distended, Non tender. +Bowel sounds  Neurologic:  Alert and oriented X 3, normal speech,   Psych:   Good insight. Not anxious nor agitated  Skin:  No rashes. No jaundice    Reviewed most current lab test results and cultures  YES  Reviewed most current radiology test results   YES  Review and summation of old records today    NO  Reviewed patient's current orders and MAR    YES  PMH/SH reviewed - no change compared to H&P  ________________________________________________________________________  Care Plan discussed with:    Comments   Patient x    Family      RN x    Care Manager x    Consultant                       x Multidiciplinary team rounds were held today with , nursing, pharmacist and clinical coordinator. Patient's plan of care was discussed; medications were reviewed and discharge planning was addressed.      ________________________________________________________________________  Total NON critical care TIME:  36 Minutes    Total CRITICAL CARE TIME Spent:   Minutes non procedure based      Comments   >50% of visit spent in counseling and coordination of care     ________________________________________________________________________  Natasha Pina MD     Procedures: see electronic medical records for all procedures/Xrays and details which were not copied into this note but were reviewed prior to creation of Plan. LABS:  I reviewed today's most current labs and imaging studies.   Pertinent labs include:  Recent Labs     09/27/22 0248 09/26/22 2134 09/26/22  1415 09/26/22  0805   WBC 8.4  --   --  9.3   HGB 9.4* 9.3* 9.2* 10.0*   HCT 30.1* 29.2* 29.3* 33.0*     --   --  282       Recent Labs     09/28/22  0315 09/27/22 0248 09/26/22 2134 09/26/22  1415 09/26/22  0934 09/26/22  0805   * 138 136 136 126* 125*   K 4.3 3.3* 3.3* 3.1* 5.7* 5.2*    102 103 100 90* 88*   CO2 22 23 25 31 17* 19*   * 188* 108* 209* 946* 920*   BUN 31* 26* 24* 20 64* 59*   CREA 3.51* 2.98* 2.67* 1.77* 4.73* 4.73*   CA 8.5 8.4* 8.3* 8.1* 8.1* 8.6   MG  --  2.1 2.2 2.1 2.5* 2.6*   PHOS  --   --   --   --  6.7* 6.3*   ALB  --   --   --   --   --  3.0*   TBILI  --   --   --   --   --  0.6   ALT  --   --   --   --   --  29         Signed: Natasha Pina MD

## 2022-09-28 NOTE — PROGRESS NOTES
Nephrology Progress Note  [facility] / [address], [place], [address]  Phone - [phone]  Fax - [phone]                 Patient: Andrew Robledo                   YOB: 1980        Date- 9/28/2022                      Admit Date: 9/26/2022  CC: Follow up for ESRD          IMPRESSION & PLAN:   [facility] right chest permacath, under Dr. Bri Rodriguez  DKA  Hyperkalemia  Anion gap acidosis  Non-anion gap metabolic acidosis  Type 1 diabetes uncontrolled  Hypertension  Anemia  History of atonic bladder with long-term Beard catheter    PLAN-  Plan for HD today. Next HD will be on Friday. Acidosis is now resolved. He will need to follow-up with urology to address the concerns about his Beard catheter  Can you discharge from renal standpoint      Subjective: Interval History:   -Patient was seen and examined today on HD.  -No issues overnight    Objective:   Vitals:    09/28/22 1130 09/28/22 1145 09/28/22 1200 09/28/22 1215   BP: (!) 157/97 (!) 154/91 (!) 167/98 (!) 184/94   Pulse: 87 88 89 87   Resp: 18 18 18 18   Temp:       TempSrc:       SpO2:       Weight:       Height:          09/27 0701 - 09/28 0700  In: 540 [P.O.:540]  Out: 1300 [Urine:1300]  Last 3 Recorded Weights in this Encounter    09/26/22 0830 09/26/22 1519 09/28/22 0312   Weight: 68 kg (150 lb) 73.7 kg (162 lb 7.7 oz) 75.8 kg (167 lb 3.2 oz)        Physical exam:    GEN: No apparent distress  NECK- Supple, no mass  RESP: No wheezing, Clear b/l  CVS: Tachycardic  NEURO: Normal speech, Non focal  EXT: No Edema   HD access: Right chest permacath    Chart reviewed. Pertinent Notes reviewed.      Data Review :  Recent Labs     09/28/22  0315 09/27/22  0248 09/26/22  2134 09/26/22  1415 09/26/22  0934 09/26/22  0805   * 138 136 136 126* 125*   K 4.3 3.3* 3.3* 3.1* 5.7* 5.2*    102 103 100 90* 88*   CO2 22 23 25 31 17* 19*   BUN 31* 26* 24* 20 64* 59*   CREA 3.51* 2.98* 2.67* 1.77* 4.73* 4.73*   * 188* 108* 209* 946* 920*   CA 8.5 8.4* 8.3* 8.1* 8.1* 8.6   MG  --  2.1 2.2 2.1 2.5* 2.6*   PHOS  --   --   --   --  6.7* 6.3*       Recent Labs     09/27/22  0248 09/26/22  2134 09/26/22  1415 09/26/22  0805   WBC 8.4  --   --  9.3   HGB 9.4* 9.3* 9.2* 10.0*   HCT 30.1* 29.2* 29.3* 33.0*     --   --  282       No results for input(s): FE, TIBC, PSAT, FERR in the last 72 hours.    Lab Results   Component Value Date/Time    Hemoglobin A1c 7.2 (H) 09/26/2022 09:34 AM    Hemoglobin A1c 8.7 (H) 08/12/2022 12:41 AM    Hemoglobin A1c 12.1 (H) 07/10/2022 07:51 PM        Lab Results   Component Value Date/Time    Microalbumin/Creat ratio (mg/g creat) 11,439 (H) 04/01/2021 10:00 AM    Microalbumin,urine random 151.00 04/01/2021 10:00 AM     Lab Results   Component Value Date/Time    NT pro-BNP 5,688 (H) 09/06/2022 09:16 AM    NT pro-BNP 11,477 (H) 08/16/2022 12:50 AM    NT pro-BNP 4,600 (H) 08/09/2022 02:13 PM    NT pro- (H) 07/06/2021 09:52 AM     US Results (most recent):  Medication list  reviewed  Current Facility-Administered Medications   Medication Dose Route Frequency    amLODIPine (NORVASC) tablet 10 mg  10 mg Oral DAILY    cloNIDine (CATAPRES) 0.1 mg/24 hr patch 1 Patch  1 Patch TransDERmal Q7D    insulin glargine (LANTUS) injection 20 Units  20 Units SubCUTAneous DAILY    insulin lispro (HUMALOG) injection 6 Units  6 Units SubCUTAneous TIDAC    insulin lispro (HUMALOG) injection   SubCUTAneous AC&HS    glucose chewable tablet 16 g  4 Tablet Oral PRN    glucagon (GLUCAGEN) injection 1 mg  1 mg IntraMUSCular PRN    dextrose 10 % infusion 0-250 mL  0-250 mL IntraVENous PRN    sodium chloride (NS) flush 5-40 mL  5-40 mL IntraVENous Q8H    sodium chloride (NS) flush 5-40 mL  5-40 mL IntraVENous PRN    acetaminophen (TYLENOL) tablet 650 mg  650 mg Oral Q6H PRN    Or    acetaminophen (TYLENOL) suppository 650 mg  650 mg Rectal Q6H PRN    polyethylene glycol (MIRALAX) packet 17 g  17 g Oral DAILY PRN    ondansetron (ZOFRAN ODT) tablet 4 mg  4 mg Oral Q8H PRN    Or    ondansetron (ZOFRAN) injection 4 mg  4 mg IntraVENous Q6H PRN    heparin (porcine) injection 5,000 Units  5,000 Units SubCUTAneous BID    pantoprazole (PROTONIX) 40 mg in 0.9% sodium chloride 10 mL injection  40 mg IntraVENous Q12H    carvediloL (COREG) tablet 12.5 mg  12.5 mg Oral BID WITH MEALS    finasteride (PROSCAR) tablet 5 mg  5 mg Oral DAILY    pregabalin (LYRICA) capsule 75 mg  75 mg Oral BID    rosuvastatin (CRESTOR) tablet 10 mg  10 mg Oral QHS    tamsulosin (FLOMAX) capsule 0.4 mg  0.4 mg Oral DAILY    hydrALAZINE (APRESOLINE) 20 mg/mL injection 10 mg  10 mg IntraVENous Q6H PRN        Sherman Steinberg MD  9/28/2022

## 2022-09-28 NOTE — PROGRESS NOTES
I have reviewed discharge instructions with the patient. The patient verbalized understanding. IV line removed and dressing applied.

## 2022-09-28 NOTE — PROGRESS NOTES
1415 patient return to the unit  1507 FSBS=45 patient not symptomatic  tow cup off OJ and grahams crackers  given lunch called it is no way will continue monitoring.   1525 recheck FSBS=75

## 2022-09-29 ENCOUNTER — PATIENT OUTREACH (OUTPATIENT)
Dept: CASE MANAGEMENT | Age: 42
End: 2022-09-29

## 2022-09-29 NOTE — PROGRESS NOTES
Care Transitions Initial Call    Call within 2 business days of discharge: Yes     Patient: Tammy Palm Patient : 1980 MRN: [id number]    Last Discharge [address]       Date Complaint Diagnosis Description Type Department Provider    22 hyperglycemia Diabetic ketoacidosis without coma associated with type 1 diabetes mellitus ([facility] [address] . .. ED to Hosp-Admission (Discharged) (ADMIT) [id number] Suresh Meadows MD; Lico Raymond,... Was this an external facility discharge? No Discharge Facility: [facility]    Challenges to be reviewed by the provider   Additional needs identified to be addressed with provider: yes  Patient readmitted from  to  for DKA due to kink in insertion tubing on insulin pump. Method of communication with provider : chart routing    Discussed COVID-19 related testing which was available at this time. Test results were negative. Patient informed of results, if available? yes     Advance Care Planning:   Does patient have an Advance Directive: not on file. Inpatient Readmission Risk score: Unplanned Readmit Risk Score: 34.5    Was this a readmission? yes   Patient stated reason for the admission: My tubing got kinked and I wasn't getting my insulin    Patients top risk factors for readmission: functional physical ability, medical condition-uncontrolled diabetes, dialysis, medication management, polypharmacy, support system, and transportation   Interventions to address risk factors: Scheduled appointment with PCP-10/4/22, Scheduled appointment with Specialist-10/28/22, and Obtained and reviewed discharge summary and/or continuity of care documents    Care Transition Nurse (CTN) contacted the patient by telephone to perform post hospital discharge assessment. Verified name and  with patient as identifiers. Provided introduction to self, and explanation of the CTN role.      CTN reviewed discharge instructions, medical action plan and red flags with patient who verbalized understanding. Were discharge instructions available to patient? yes. Reviewed appropriate site of care based on symptoms and resources available to patient including: PCP, Specialist, Urgent Care Clinics, and When to call 911. Patient given an opportunity to ask questions and does not have any further questions or concerns at this time. The patient agrees to contact the PCP office for questions related to their healthcare. Medication reconciliation was performed with patient, who verbalizes understanding of administration of home medications. Advised obtaining a 90-day supply of all daily and as-needed medications. Referral to Pharm D needed: no     Home Health/Outpatient orders at discharge: none    Durable Medical Equipment ordered at discharge: None      Was patient discharged with a pulse oximeter? no    Discussed follow-up appointments. If no appointment was previously scheduled, appointment scheduling offered: yes. Is follow up appointment scheduled within 7 days of discharge? yes. [address] follow up appointment(s):   Future Appointments   Date Time Provider Caryl Foley   9/30/2022  2:30 PM Portillo Guillory RD [facility] BS AMB   10/4/2022  9:00 AM Roscoe Cain MD [facility] BS AMB   10/13/2022  1:15 PM Roscoe Cain MD [facility] BS AMB   10/28/2022 10:30 AM Chalino Sanders MD RDE [facility] BS AMB     Non-[facility] follow up appointment(s): [facility] on 10/6/22    Plan for follow-up call in 5-7 days based on severity of symptoms and risk factors. Plan for next call: self management-bs checks with insertion site monitoring and dialysis catheter monitoring for infection and follow up appointment-10/4/22 with pcp, and diabetes education on 9/30/22  CTN provided contact information for future needs. Goals Addressed                   This Visit's Progress     Prevent complications post hospitalization.         8/23/22    Patient to attend pcp appt on 8/25/22  Patient to use dexacom and pump to control blood sugars and check bs 5 times daily with meals, fasting in the am and before bed. Ctn contacted mom[employer] meals and restarted meal delivery for patient. Ctn gave patient number for senior connections rides and lets go service for transportation needs. [facility] sent to follow patient and soc on 8/23/22  Patient with hansen and instructed wash daily with soap and water, empty bag daily and monitor urine color and clarity  Patient to add sodium bicarb tablets and take as ordered. Ctn to follow up in one week. Don Mcbride RN    9/13/22    Patient to attend pcp appt on 9/13/22  Patient to use dexacom and pump to control blood sugars and check bs 5 times daily with meals, fasting in the am and before bed. [facility] sent to follow patient and georgina  Patient to monitor dialysis catheter for signs of infection including redness, swelling, drainage, fever  Patient with hansen and instructed wash daily with soap and water, empty bag daily and monitor urine color and clarity  Patient to add sodium bicarb tablets and take as ordered. Ctn to follow up in one week. Don Mcbride RN    9/29/22  Patient to use dexacom and pump to control blood sugars and check bs 5 times daily with meals, fasting in the am and before bed. Patient to monitor dialysis catheter for signs of infection including redness, swelling, drainage, fever  Patient to attend pcp appt on 10/4/22  Patient to attend diabetes education on 9/30/22  Patient to adhere to fluid restriction of 32 oz a day and dietary restrictions related to dialysis including no dairy, no soda, only brewed tea, low phosphorus and low potassium foods   Ctn to follow up in one week.    Don Mcbride RN

## 2022-10-02 LAB
BACTERIA SPEC CULT: NORMAL
SERVICE CMNT-IMP: NORMAL

## 2022-10-05 DIAGNOSIS — E10.49 OTHER DIABETIC NEUROLOGICAL COMPLICATION ASSOCIATED WITH TYPE 1 DIABETES MELLITUS (HCC): ICD-10-CM

## 2022-10-06 RX ORDER — DULOXETIN HYDROCHLORIDE 60 MG/1
CAPSULE, DELAYED RELEASE ORAL
Qty: 30 CAPSULE | Refills: 0 | Status: SHIPPED | OUTPATIENT
Start: 2022-10-06

## 2022-10-14 ENCOUNTER — PATIENT OUTREACH (OUTPATIENT)
Dept: CASE MANAGEMENT | Age: 42
End: 2022-10-14

## 2022-10-14 NOTE — PROGRESS NOTES
Care Transitions Outreach Attempt    Call within 2 business days of discharge: Yes   Attempted to reach patient for transitions of care follow up. Unable to reach patient. left message with patient and mother. Chilango Delgadillo RN, CPN - Care Transition Nurse- [phone]       Patient: Marlys Granados Patient : 1980 MRN: [id number]    Last Discharge [address]       Date Complaint Diagnosis Description Type Department Provider    22 hyperglycemia Diabetic ketoacidosis without coma associated with type 1 diabetes mellitus ([facility] [address] . .. ED to Hosp-Admission (Discharged) (ADMIT) [id number] Radha Stark MD; Francisco Denis,... Was this an external facility discharge? No Discharge Facility: [facility]      Noted following upcoming appointments from discharge chart review:   [facility] follow up appointment(s):   Future Appointments   Date Time Provider Caryl Foley   10/25/2022  1:00 PM [address] [place] [address], [facility] BS AMB   10/28/2022 10:30 AM Belen Wiley MD [facility] BS AMB     Non-[facility] follow up appointment(s): none at this time.

## 2022-10-16 ENCOUNTER — HOSPITAL ENCOUNTER (INPATIENT)
Age: 42
LOS: 9 days | Discharge: HOME | DRG: 420 | End: 2022-10-25
Attending: STUDENT IN AN ORGANIZED HEALTH CARE EDUCATION/TRAINING PROGRAM | Admitting: INTERNAL MEDICINE
Payer: MEDICAID

## 2022-10-16 ENCOUNTER — APPOINTMENT (OUTPATIENT)
Dept: GENERAL RADIOLOGY | Age: 42
DRG: 420 | End: 2022-10-16
Attending: STUDENT IN AN ORGANIZED HEALTH CARE EDUCATION/TRAINING PROGRAM
Payer: MEDICAID

## 2022-10-16 DIAGNOSIS — R11.2 NAUSEA AND VOMITING, UNSPECIFIED VOMITING TYPE: ICD-10-CM

## 2022-10-16 DIAGNOSIS — E10.10 TYPE 1 DIABETES MELLITUS WITH KETOACIDOSIS WITHOUT COMA (HCC): Primary | ICD-10-CM

## 2022-10-16 LAB
ADMINISTERED INITIALS, ADMINIT: NORMAL
ALBUMIN SERPL-MCNC: 3.1 G/DL (ref 3.5–5)
ALBUMIN/GLOB SERPL: 1 {RATIO} (ref 1.1–2.2)
ALP SERPL-CCNC: 166 U/L (ref 45–117)
ALT SERPL-CCNC: 27 U/L (ref 12–78)
ANION GAP SERPL CALC-SCNC: 30 MMOL/L (ref 5–15)
AST SERPL-CCNC: 20 U/L (ref 15–37)
BASE DEFICIT BLD-SCNC: 14.3 MMOL/L
BASOPHILS # BLD: 0.1 K/UL (ref 0–0.1)
BASOPHILS NFR BLD: 1 % (ref 0–1)
BILIRUB SERPL-MCNC: 0.5 MG/DL (ref 0.2–1)
BUN SERPL-MCNC: 60 MG/DL (ref 6–20)
BUN/CREAT SERPL: 11 (ref 12–20)
CA-I BLD-MCNC: 1 MMOL/L (ref 1.12–1.32)
CALCIUM SERPL-MCNC: 8.9 MG/DL (ref 8.5–10.1)
CHLORIDE BLD-SCNC: 97 MMOL/L (ref 100–108)
CHLORIDE SERPL-SCNC: 88 MMOL/L (ref 97–108)
CO2 BLD-SCNC: 12 MMOL/L (ref 19–24)
CO2 SERPL-SCNC: 12 MMOL/L (ref 21–32)
COMMENT, HOLDF: NORMAL
CREAT SERPL-MCNC: 5.23 MG/DL (ref 0.7–1.3)
CREAT UR-MCNC: 4.4 MG/DL (ref 0.6–1.3)
D50 ADMINISTERED, D50ADM: 0 ML
D50 ORDER, D50ORD: 0 ML
DIFFERENTIAL METHOD BLD: ABNORMAL
EOSINOPHIL # BLD: 0 K/UL (ref 0–0.4)
EOSINOPHIL NFR BLD: 0 % (ref 0–7)
ERYTHROCYTE [DISTWIDTH] IN BLOOD BY AUTOMATED COUNT: 19.5 % (ref 11.5–14.5)
GLOBULIN SER CALC-MCNC: 3 G/DL (ref 2–4)
GLUCOSE BLD STRIP.AUTO-MCNC: 175 MG/DL (ref 65–117)
GLUCOSE BLD STRIP.AUTO-MCNC: 246 MG/DL (ref 65–117)
GLUCOSE BLD STRIP.AUTO-MCNC: 330 MG/DL (ref 65–117)
GLUCOSE BLD STRIP.AUTO-MCNC: 409 MG/DL (ref 65–117)
GLUCOSE BLD STRIP.AUTO-MCNC: 507 MG/DL (ref 65–117)
GLUCOSE BLD STRIP.AUTO-MCNC: >600 MG/DL (ref 65–117)
GLUCOSE BLD STRIP.AUTO-MCNC: >700 MG/DL (ref 74–106)
GLUCOSE SERPL-MCNC: 924 MG/DL (ref 65–100)
GLUCOSE, GLC: 175 MG/DL
GLUCOSE, GLC: 246 MG/DL
GLUCOSE, GLC: 330 MG/DL
GLUCOSE, GLC: 409 MG/DL
GLUCOSE, GLC: 507 MG/DL
GLUCOSE, GLC: 600 MG/DL
GLUCOSE, GLC: 601 MG/DL
HCO3 BLDA-SCNC: 12 MMOL/L
HCT VFR BLD AUTO: 39.1 % (ref 36.6–50.3)
HGB BLD-MCNC: 11.4 G/DL (ref 12.1–17)
HIGH TARGET, HITG: 200 MG/DL
IMM GRANULOCYTES # BLD AUTO: 0.1 K/UL (ref 0–0.04)
IMM GRANULOCYTES NFR BLD AUTO: 1 % (ref 0–0.5)
INSULIN ADMINSTERED, INSADM: 10.4 UNITS/HOUR
INSULIN ADMINSTERED, INSADM: 10.8 UNITS/HOUR
INSULIN ADMINSTERED, INSADM: 16.2 UNITS/HOUR
INSULIN ADMINSTERED, INSADM: 16.7 UNITS/HOUR
INSULIN ADMINSTERED, INSADM: 21.6 UNITS/HOUR
INSULIN ADMINSTERED, INSADM: 21.6 UNITS/HOUR
INSULIN ADMINSTERED, INSADM: 24.4 UNITS/HOUR
INSULIN ADMINSTERED, INSADM: 26.8 UNITS/HOUR
INSULIN ADMINSTERED, INSADM: 27 UNITS/HOUR
INSULIN ORDER, INSORD: 10.4 UNITS/HOUR
INSULIN ORDER, INSORD: 10.8 UNITS/HOUR
INSULIN ORDER, INSORD: 16.2 UNITS/HOUR
INSULIN ORDER, INSORD: 16.7 UNITS/HOUR
INSULIN ORDER, INSORD: 21.6 UNITS/HOUR
INSULIN ORDER, INSORD: 21.6 UNITS/HOUR
INSULIN ORDER, INSORD: 24.4 UNITS/HOUR
INSULIN ORDER, INSORD: 26.8 UNITS/HOUR
INSULIN ORDER, INSORD: 27 UNITS/HOUR
LACTATE BLD-SCNC: 2 MMOL/L (ref 0.4–2)
LOW TARGET, LOT: 150 MG/DL
LYMPHOCYTES # BLD: 0.7 K/UL (ref 0.8–3.5)
LYMPHOCYTES NFR BLD: 5 % (ref 12–49)
MAGNESIUM SERPL-MCNC: 3.1 MG/DL (ref 1.6–2.4)
MCH RBC QN AUTO: 26.3 PG (ref 26–34)
MCHC RBC AUTO-ENTMCNC: 29.2 G/DL (ref 30–36.5)
MCV RBC AUTO: 90.1 FL (ref 80–99)
MINUTES UNTIL NEXT BG, NBG: 60 MIN
MONOCYTES # BLD: 0.7 K/UL (ref 0–1)
MONOCYTES NFR BLD: 5 % (ref 5–13)
MULTIPLIER, MUL: 0.02
MULTIPLIER, MUL: 0.03
MULTIPLIER, MUL: 0.04
MULTIPLIER, MUL: 0.05
MULTIPLIER, MUL: 0.06
MULTIPLIER, MUL: 0.07
MULTIPLIER, MUL: 0.08
MULTIPLIER, MUL: 0.09
MULTIPLIER, MUL: 0.09
NEUTS SEG # BLD: 12.4 K/UL (ref 1.8–8)
NEUTS SEG NFR BLD: 88 % (ref 32–75)
NRBC # BLD: 0 K/UL (ref 0–0.01)
NRBC BLD-RTO: 0 PER 100 WBC
ORDER INITIALS, ORDINIT: 246
ORDER INITIALS, ORDINIT: NORMAL
PCO2 BLDV: 27.3 MMHG (ref 41–51)
PH BLDV: 7.23 [PH] (ref 7.32–7.42)
PHOSPHATE SERPL-MCNC: 8.6 MG/DL (ref 2.6–4.7)
PLATELET # BLD AUTO: 290 K/UL (ref 150–400)
PMV BLD AUTO: 11.5 FL (ref 8.9–12.9)
PO2 BLDV: 53 MMHG (ref 25–40)
POTASSIUM BLD-SCNC: 5.3 MMOL/L (ref 3.5–5.5)
POTASSIUM SERPL-SCNC: 5.5 MMOL/L (ref 3.5–5.1)
PROT SERPL-MCNC: 6.1 G/DL (ref 6.4–8.2)
RBC # BLD AUTO: 4.34 M/UL (ref 4.1–5.7)
RBC MORPH BLD: ABNORMAL
RBC MORPH BLD: ABNORMAL
SAMPLES BEING HELD,HOLD: NORMAL
SERVICE CMNT-IMP: ABNORMAL
SODIUM BLD-SCNC: 124 MMOL/L (ref 136–145)
SODIUM SERPL-SCNC: 130 MMOL/L (ref 136–145)
SPECIMEN SITE: ABNORMAL
TROPONIN-HIGH SENSITIVITY: 17 NG/L (ref 0–76)
WBC # BLD AUTO: 14 K/UL (ref 4.1–11.1)

## 2022-10-16 PROCEDURE — 74011250636 HC RX REV CODE- 250/636: Performed by: HOSPITALIST

## 2022-10-16 PROCEDURE — 36415 COLL VENOUS BLD VENIPUNCTURE: CPT

## 2022-10-16 PROCEDURE — 74011250636 HC RX REV CODE- 250/636: Performed by: INTERNAL MEDICINE

## 2022-10-16 PROCEDURE — 99285 EMERGENCY DEPT VISIT HI MDM: CPT

## 2022-10-16 PROCEDURE — 74011000258 HC RX REV CODE- 258: Performed by: STUDENT IN AN ORGANIZED HEALTH CARE EDUCATION/TRAINING PROGRAM

## 2022-10-16 PROCEDURE — 84484 ASSAY OF TROPONIN QUANT: CPT

## 2022-10-16 PROCEDURE — 71045 X-RAY EXAM CHEST 1 VIEW: CPT

## 2022-10-16 PROCEDURE — 74011000250 HC RX REV CODE- 250: Performed by: INTERNAL MEDICINE

## 2022-10-16 PROCEDURE — 96374 THER/PROPH/DIAG INJ IV PUSH: CPT

## 2022-10-16 PROCEDURE — 84100 ASSAY OF PHOSPHORUS: CPT

## 2022-10-16 PROCEDURE — 74011250637 HC RX REV CODE- 250/637: Performed by: INTERNAL MEDICINE

## 2022-10-16 PROCEDURE — 80053 COMPREHEN METABOLIC PANEL: CPT

## 2022-10-16 PROCEDURE — 85025 COMPLETE CBC W/AUTO DIFF WBC: CPT

## 2022-10-16 PROCEDURE — 93005 ELECTROCARDIOGRAM TRACING: CPT

## 2022-10-16 PROCEDURE — 82947 ASSAY GLUCOSE BLOOD QUANT: CPT

## 2022-10-16 PROCEDURE — 74011250636 HC RX REV CODE- 250/636: Performed by: STUDENT IN AN ORGANIZED HEALTH CARE EDUCATION/TRAINING PROGRAM

## 2022-10-16 PROCEDURE — 83735 ASSAY OF MAGNESIUM: CPT

## 2022-10-16 PROCEDURE — 65270000046 HC RM TELEMETRY

## 2022-10-16 PROCEDURE — 74011000250 HC RX REV CODE- 250: Performed by: HOSPITALIST

## 2022-10-16 PROCEDURE — 82962 GLUCOSE BLOOD TEST: CPT

## 2022-10-16 PROCEDURE — 74011636637 HC RX REV CODE- 636/637: Performed by: STUDENT IN AN ORGANIZED HEALTH CARE EDUCATION/TRAINING PROGRAM

## 2022-10-16 RX ORDER — HEPARIN SODIUM 5000 [USP'U]/ML
5000 INJECTION, SOLUTION INTRAVENOUS; SUBCUTANEOUS EVERY 8 HOURS
Status: DISCONTINUED | OUTPATIENT
Start: 2022-10-16 | End: 2022-10-25 | Stop reason: HOSPADM

## 2022-10-16 RX ORDER — ONDANSETRON 2 MG/ML
4 INJECTION INTRAMUSCULAR; INTRAVENOUS
Status: DISCONTINUED | OUTPATIENT
Start: 2022-10-16 | End: 2022-10-21

## 2022-10-16 RX ORDER — ACETAMINOPHEN 650 MG/1
650 SUPPOSITORY RECTAL
Status: DISCONTINUED | OUTPATIENT
Start: 2022-10-16 | End: 2022-10-25 | Stop reason: HOSPADM

## 2022-10-16 RX ORDER — TAMSULOSIN HYDROCHLORIDE 0.4 MG/1
0.4 CAPSULE ORAL DAILY
Status: DISCONTINUED | OUTPATIENT
Start: 2022-10-17 | End: 2022-10-25 | Stop reason: HOSPADM

## 2022-10-16 RX ORDER — POLYETHYLENE GLYCOL 3350 17 G/17G
17 POWDER, FOR SOLUTION ORAL DAILY PRN
Status: DISCONTINUED | OUTPATIENT
Start: 2022-10-16 | End: 2022-10-25 | Stop reason: HOSPADM

## 2022-10-16 RX ORDER — METOCLOPRAMIDE HYDROCHLORIDE 5 MG/ML
10 INJECTION INTRAMUSCULAR; INTRAVENOUS
Status: COMPLETED | OUTPATIENT
Start: 2022-10-16 | End: 2022-10-16

## 2022-10-16 RX ORDER — ACETAMINOPHEN 325 MG/1
650 TABLET ORAL
Status: DISCONTINUED | OUTPATIENT
Start: 2022-10-16 | End: 2022-10-25 | Stop reason: HOSPADM

## 2022-10-16 RX ORDER — SODIUM CHLORIDE 0.9 % (FLUSH) 0.9 %
5-40 SYRINGE (ML) INJECTION AS NEEDED
Status: DISCONTINUED | OUTPATIENT
Start: 2022-10-16 | End: 2022-10-25 | Stop reason: HOSPADM

## 2022-10-16 RX ORDER — CLONIDINE 0.1 MG/24H
1 PATCH, EXTENDED RELEASE TRANSDERMAL
Status: DISCONTINUED | OUTPATIENT
Start: 2022-10-16 | End: 2022-10-16

## 2022-10-16 RX ORDER — DEXTROSE MONOHYDRATE 100 MG/ML
0-250 INJECTION, SOLUTION INTRAVENOUS AS NEEDED
Status: DISCONTINUED | OUTPATIENT
Start: 2022-10-16 | End: 2022-10-17

## 2022-10-16 RX ORDER — ONDANSETRON 2 MG/ML
4 INJECTION INTRAMUSCULAR; INTRAVENOUS
Status: COMPLETED | OUTPATIENT
Start: 2022-10-16 | End: 2022-10-16

## 2022-10-16 RX ORDER — MAGNESIUM SULFATE 100 %
4 CRYSTALS MISCELLANEOUS AS NEEDED
Status: DISCONTINUED | OUTPATIENT
Start: 2022-10-16 | End: 2022-10-17

## 2022-10-16 RX ORDER — SODIUM CHLORIDE 0.9 % (FLUSH) 0.9 %
5-40 SYRINGE (ML) INJECTION EVERY 8 HOURS
Status: DISCONTINUED | OUTPATIENT
Start: 2022-10-16 | End: 2022-10-25 | Stop reason: HOSPADM

## 2022-10-16 RX ORDER — METOPROLOL TARTRATE 5 MG/5ML
2.5 INJECTION INTRAVENOUS ONCE
Status: COMPLETED | OUTPATIENT
Start: 2022-10-16 | End: 2022-10-16

## 2022-10-16 RX ORDER — DULOXETIN HYDROCHLORIDE 30 MG/1
60 CAPSULE, DELAYED RELEASE ORAL DAILY
Status: DISCONTINUED | OUTPATIENT
Start: 2022-10-17 | End: 2022-10-25 | Stop reason: HOSPADM

## 2022-10-16 RX ORDER — ONDANSETRON 4 MG/1
4 TABLET, ORALLY DISINTEGRATING ORAL
Status: DISCONTINUED | OUTPATIENT
Start: 2022-10-16 | End: 2022-10-21

## 2022-10-16 RX ORDER — FINASTERIDE 5 MG/1
5 TABLET, FILM COATED ORAL DAILY
Status: DISCONTINUED | OUTPATIENT
Start: 2022-10-17 | End: 2022-10-25 | Stop reason: HOSPADM

## 2022-10-16 RX ADMIN — ONDANSETRON 4 MG: 2 INJECTION INTRAMUSCULAR; INTRAVENOUS at 18:51

## 2022-10-16 RX ADMIN — SODIUM CHLORIDE 10.4 UNITS/HR: 9 INJECTION, SOLUTION INTRAVENOUS at 23:30

## 2022-10-16 RX ADMIN — SODIUM CHLORIDE, PRESERVATIVE FREE 10 ML: 5 INJECTION INTRAVENOUS at 21:16

## 2022-10-16 RX ADMIN — SODIUM CHLORIDE 21.6 UNITS/HR: 9 INJECTION, SOLUTION INTRAVENOUS at 16:04

## 2022-10-16 RX ADMIN — METOCLOPRAMIDE 10 MG: 5 INJECTION, SOLUTION INTRAMUSCULAR; INTRAVENOUS at 21:01

## 2022-10-16 RX ADMIN — SODIUM CHLORIDE 1000 ML: 9 INJECTION, SOLUTION INTRAVENOUS at 13:24

## 2022-10-16 RX ADMIN — METOPROLOL TARTRATE 2.5 MG: 5 INJECTION, SOLUTION INTRAVENOUS at 21:01

## 2022-10-16 RX ADMIN — HEPARIN SODIUM 5000 UNITS: 5000 INJECTION INTRAVENOUS; SUBCUTANEOUS at 16:52

## 2022-10-16 RX ADMIN — HEPARIN SODIUM 5000 UNITS: 5000 INJECTION INTRAVENOUS; SUBCUTANEOUS at 21:00

## 2022-10-16 RX ADMIN — ONDANSETRON 4 MG: 2 INJECTION INTRAMUSCULAR; INTRAVENOUS at 13:46

## 2022-10-16 RX ADMIN — ACETAMINOPHEN 650 MG: 325 TABLET ORAL at 21:01

## 2022-10-16 RX ADMIN — SODIUM CHLORIDE 10.8 UNITS/HR: 9 INJECTION, SOLUTION INTRAVENOUS at 14:03

## 2022-10-16 RX ADMIN — SODIUM CHLORIDE 16.2 UNITS/HR: 9 INJECTION, SOLUTION INTRAVENOUS at 15:04

## 2022-10-16 RX ADMIN — SODIUM CHLORIDE 26.8 UNITS/HR: 9 INJECTION, SOLUTION INTRAVENOUS at 18:26

## 2022-10-16 RX ADMIN — SODIUM CHLORIDE, PRESERVATIVE FREE 10 ML: 5 INJECTION INTRAVENOUS at 18:33

## 2022-10-16 NOTE — ED NOTES
Pt arrives to ED via EMS with complaints of nausea, vomiting and diarrhea x 1 day. Pt states that his glucose reading was not working. EMS stated that his POC glucose read high. Pt stated that he has substernal chest pain that began this morning.

## 2022-10-16 NOTE — ED NOTES
TRANSFER - OUT REPORT:    Verbal report given to FLAKO RN(name) on Pauline Mendez  being transferred to PCU(unit) for routine progression of care       Report consisted of patients Situation, Background, Assessment and   Recommendations(SBAR). Information from the following report(s) SBAR, Kardex, ED Summary, and MAR was reviewed with the receiving nurse. Lines:   Peripheral IV 92/67/43 Left Basilic (Active)   Site Assessment Clean, dry, & intact 10/16/22 1316   Phlebitis Assessment 0 10/16/22 1316   Infiltration Assessment 0 10/16/22 1316   Dressing Status Clean, dry, & intact 10/16/22 1316   Dressing Type Tape;Transparent 10/16/22 1316        Opportunity for questions and clarification was provided.       Patient transported with:   Monitor  Registered Nurse

## 2022-10-16 NOTE — ED PROVIDER NOTES
EMERGENCY DEPARTMENT HISTORY AND PHYSICAL EXAM      Date: 10/16/2022  Patient Name: Marlys Granados    History of Presenting Illness     Chief Complaint   Patient presents with    Nausea    Vomiting    Chest Pain     Pt arrives to ED via EMS with complaints of nausea, vomiting and diarrhea x 1 day. Pt states that his glucose reading was not working. EMS stated that his POC glucose read high. Pt stated that he has substernal chest pain that began this morning. High Blood Sugar    Diarrhea         HPI: Marlys Granados, 46 y.o. male presents to the ED with cc of nausea vomiting and elevated blood sugars. This started last night. He reports countless episodes of nonbloody emesis and diarrhea. He denies any fevers. Does report some associated substernal chest pressure and difficulty breathing. He denies dysuria or hematuria, no significant abdominal pain. He has a history of type 1 diabetes, says that he thinks he has been compliant with his insulin. Also has been compliant with dialysis, has permacath in place, goes Monday Wednesday and Friday. There are no other complaints, changes, or physical findings at this time. PCP: Yumi Holly MD    No current facility-administered medications on file prior to encounter. Current Outpatient Medications on File Prior to Encounter   Medication Sig Dispense Refill    DULoxetine (CYMBALTA) 60 mg capsule Take 1 capsule by mouth once daily 30 Capsule 0    naloxone (Narcan) 4 mg/actuation nasal spray Use 1 spray intranasally, then discard. Repeat with new spray every 2 min as needed for opioid overdose symptoms, alternating nostrils. 1 Each 0    rosuvastatin (CRESTOR) 10 mg tablet Take 1 Tablet by mouth nightly for 60 days. 30 Tablet 1    bacitracin zinc (BACITRACIN) ointment Apply  to affected area two (2) times a day. 28 g 4    Ketone Blood Test strp 50 Each by Does Not Apply route as needed for PRN Reason (Other) (as needed for suspected dka). 50 Strip 3    pantoprazole (PROTONIX) 40 mg tablet Take 1 Tablet by mouth Daily (before breakfast). 30 Tablet 0    finasteride (PROSCAR) 5 mg tablet Take 1 Tablet by mouth in the morning. 30 Tablet 2    insulin aspart U-100 (NovoLOG U-100 Insulin aspart) 100 unit/mL injection Use as instructed via insulin pump. Dx code E10.65 max daily dose 50U 30 mL 2    pregabalin (Lyrica) 75 mg capsule Take 1 Capsule by mouth two (2) times a day. Max Daily Amount: 150 mg. 60 Capsule 2    Walker (Ultra-Light Rollator) misc Use while ambulating 1 Each 0    amLODIPine (NORVASC) 10 mg tablet Take 1 Tablet by mouth daily. 90 Tablet 1    cloNIDine (CATAPRES) 0.1 mg/24 hr ptwk 1 Patch by TransDERmal route every seven (7) days. 12 Patch 1    Dexcom G6  misc Use with the dexcom device to check blood sugars 4 times a day 1 Each 0    Dexcom G6 Sensor brandi Use as directed every 10 days 10 Each 11    Dexcom G6 Transmitter brandi Use as directed 10 Each 3    Insulin Needles, Disposable, 31 gauge x 5/16\" ndle Dx code E11.65, use 6x daily 200 Each 11    carvediloL (COREG) 12.5 mg tablet Take 1 Tablet by mouth two (2) times daily (with meals). 60 Tablet 1    tamsulosin (FLOMAX) 0.4 mg capsule Take 0.4 mg by mouth daily.          Past History     Past Medical History:  Past Medical History:   Diagnosis Date    Bipolar 1 disorder, depressed ([facility] [address]     Bipolar disorder ([facility] [address]     Chronic kidney disease, stage 3a ([facility] [address]     Depression     Diabetes ([facility] [address]     DKA, type 1 ([facility] [address] 1/27/2013    diagnosed age 23    DKA, type 1 ([address]     H/O noncompliance with medical treatment, presenting hazards to health     MRSA (methicillin resistant staph aureus) culture positive     MRSA (methicillin resistant Staphylococcus aureus)     Face    Noncompliance with medication regimen     Second hand smoke exposure     Seizure ([address]     Seizures ([address] 2006 or 2007    one episode during half-way       Past Surgical History:  Past Surgical History:   Procedure Laterality Date    HX HEENT      top left wisdom tooth    HX ORTHOPAEDIC Left     wrist; MCV    IR INSERT NON TUNL CVC OVER 5 YRS  2022    IR INSERT TUNL CVC W/O PORT OVER 5 YR  2022    UPPER GI ENDOSCOPY,BIOPSY  2018            Family History:  Family History   Problem Relation Age of Onset    Diabetes Mother     Diabetes Other         neice, type 1        Social History:  Social History     Tobacco Use    Smoking status: Former     Packs/day: 0.10     Years: 16.00     Pack years: 1.60     Types: Cigarettes     Start date: 10/4/1999     Quit date: 2020     Years since quittin.6    Smokeless tobacco: Never   Vaping Use    Vaping Use: Never used   Substance Use Topics    Alcohol use: No    Drug use: No       Allergies:  No Known Allergies      Review of Systems   no fever  No ear pain  No eye pain  Reports shortness of breath  Reports chest pain  Reports vomiting  no dysuria  no leg pain  No rash  No lymphadenopathy  No weight gain    Physical Exam   Physical Exam  Constitutional:       General: He is in acute distress. Appearance: He is not toxic-appearing. Comments: Clue small respirations, he is uncomfortable   HENT:      Head: Normocephalic and atraumatic. Mouth/Throat:      Comments:   Tacky mucous membranes  Eyes:      Extraocular Movements: Extraocular movements intact. Cardiovascular:      Rate and Rhythm: Regular rhythm. Tachycardia present. Pulmonary:      Comments: Respirations are shallow and tachypneic, no significant accessory muscle use, he is able to speak in full sentences, lungs clear to auscultation bilaterally  Chest:      Comments: Permacath in place in right anterior chest wall without surrounding swelling or erythema  Abdominal:      Palpations: Abdomen is soft. Tenderness: There is no abdominal tenderness. Musculoskeletal:         General: No deformity. Cervical back: Neck supple. Skin:     General: Skin is warm and dry.    Neurological: General: No focal deficit present. Mental Status: He is alert and oriented to person, place, and time. Psychiatric:         Mood and Affect: Mood normal.       Diagnostic Study Results     Labs -     Recent Results (from the past 24 hour(s))   CBC WITH AUTOMATED DIFF    Collection Time: 10/16/22  1:15 PM   Result Value Ref Range    WBC 14.0 (H) 4.1 - 11.1 K/uL    RBC 4.34 4.10 - 5.70 M/uL    HGB 11.4 (L) 12.1 - 17.0 g/dL    HCT 39.1 36.6 - 50.3 %    MCV 90.1 80.0 - 99.0 FL    MCH 26.3 26.0 - 34.0 PG    MCHC 29.2 (L) 30.0 - 36.5 g/dL    RDW 19.5 (H) 11.5 - 14.5 %    PLATELET 352 971 - 181 K/uL    MPV 11.5 8.9 - 12.9 FL    NRBC 0.0 0  WBC    ABSOLUTE NRBC 0.00 0.00 - 0.01 K/uL    NEUTROPHILS 88 (H) 32 - 75 %    LYMPHOCYTES 5 (L) 12 - 49 %    MONOCYTES 5 5 - 13 %    EOSINOPHILS 0 0 - 7 %    BASOPHILS 1 0 - 1 %    IMMATURE GRANULOCYTES 1 (H) 0.0 - 0.5 %    ABS. NEUTROPHILS 12.4 (H) 1.8 - 8.0 K/UL    ABS. LYMPHOCYTES 0.7 (L) 0.8 - 3.5 K/UL    ABS. MONOCYTES 0.7 0.0 - 1.0 K/UL    ABS. EOSINOPHILS 0.0 0.0 - 0.4 K/UL    ABS. BASOPHILS 0.1 0.0 - 0.1 K/UL    ABS. IMM. GRANS. 0.1 (H) 0.00 - 0.04 K/UL    DF SMEAR SCANNED      RBC COMMENTS DEBBIE CELLS  2+        RBC COMMENTS ANISOCYTOSIS  1+       METABOLIC PANEL, COMPREHENSIVE    Collection Time: 10/16/22  1:15 PM   Result Value Ref Range    Sodium 130 (L) 136 - 145 mmol/L    Potassium 5.5 (H) 3.5 - 5.1 mmol/L    Chloride 88 (L) 97 - 108 mmol/L    CO2 12 (LL) 21 - 32 mmol/L    Anion gap 30 (H) 5 - 15 mmol/L    Glucose 924 (HH) 65 - 100 mg/dL    BUN 60 (H) 6 - 20 MG/DL    Creatinine 5.23 (H) 0.70 - 1.30 MG/DL    BUN/Creatinine ratio 11 (L) 12 - 20      eGFR 13 (L) >60 ml/min/1.73m2    Calcium 8.9 8.5 - 10.1 MG/DL    Bilirubin, total 0.5 0.2 - 1.0 MG/DL    ALT (SGPT) 27 12 - 78 U/L    AST (SGOT) 20 15 - 37 U/L    Alk.  phosphatase 166 (H) 45 - 117 U/L    Protein, total 6.1 (L) 6.4 - 8.2 g/dL    Albumin 3.1 (L) 3.5 - 5.0 g/dL    Globulin 3.0 2.0 - 4.0 g/dL    A-G Ratio 1.0 (L) 1.1 - 2.2     MAGNESIUM    Collection Time: 10/16/22  1:15 PM   Result Value Ref Range    Magnesium 3.1 (H) 1.6 - 2.4 mg/dL   PHOSPHORUS    Collection Time: 10/16/22  1:15 PM   Result Value Ref Range    Phosphorus 8.6 (H) 2.6 - 4.7 MG/DL   SAMPLES BEING HELD    Collection Time: 10/16/22  1:15 PM   Result Value Ref Range    SAMPLES BEING HELD DRK GRN     COMMENT        Add-on orders for these samples will be processed based on acceptable specimen integrity and analyte stability, which may vary by analyte.    BLOOD GAS,CHEM8,LACTIC ACID POC    Collection Time: 10/16/22  1:20 PM   Result Value Ref Range    Calcium, ionized (POC) 1.00 (L) 1.12 - 1.32 mmol/L    BICARBONATE 12 mmol/L    Base deficit (POC) 14.3 mmol/L    Sample source VENOUS BLOOD      CO2, POC 12 (L) 19 - 24 MMOL/L    Sodium,  (L) 136 - 145 MMOL/L    Potassium, POC 5.3 3.5 - 5.5 MMOL/L    Chloride, POC 97 (L) 100 - 108 MMOL/L    Glucose, POC >700 (HH) 74 - 106 MG/DL    Creatinine, POC 4.4 (H) 0.6 - 1.3 MG/DL    Lactic Acid (POC) 2.00 0.40 - 2.00 mmol/L    Critical value read back MINVENDITTI     pH, venous (POC) 7.23 (L) 7.32 - 7.42      pCO2, venous (POC) 27.3 (L) 41 - 51 MMHG    pO2, venous (POC) 53 (H) 25 - 40 mmHg   GLUCOSTABILIZER    Collection Time: 10/16/22  2:03 PM   Result Value Ref Range    Glucose 601 mg/dL    Insulin order 10.8 units/hour    Insulin adminstered 10.8 units/hour    Multiplier 0.020     Low target 150 mg/dL    High target 200 mg/dL    D50 order 0.0 ml    D50 administered 0.00 ml    Minutes until next BG 60 min    Order initials LA     Administered initials LA    TROPONIN-HIGH SENSITIVITY    Collection Time: 10/16/22  2:06 PM   Result Value Ref Range    Troponin-High Sensitivity 17 0 - 76 ng/L   GLUCOSE, POC    Collection Time: 10/16/22  2:59 PM   Result Value Ref Range    Glucose (POC) >600 (HH) 65 - 117 mg/dL    Performed by Wynonia Kawasaki RN    GLUCOSTABILIZER    Collection Time: 10/16/22  3:03 PM   Result Value Ref Range    Glucose 601 mg/dL    Insulin order 16.2 units/hour    Insulin adminstered 16.2 units/hour    Multiplier 0.030     Low target 150 mg/dL    High target 200 mg/dL    D50 order 0.0 ml    D50 administered 0.00 ml    Minutes until next BG 60 min    Order initials LA     Administered initials LA        Radiologic Studies -   XR CHEST PORT   Final Result      No acute findings. CT Results  (Last 48 hours)      None          CXR Results  (Last 48 hours)                 10/16/22 1351  XR CHEST PORT Final result    Impression:      No acute findings. Narrative:  EXAM:  XR CHEST PORT       INDICATION: Cough       COMPARISON: 9/27/2022       TECHNIQUE: Upright portable chest AP view       FINDINGS: A double lumen right IJ line is again shown terminating at the   cavoatrial junction. The cardiomediastinal and hilar contours are within normal   limits. Lungs and pleural margins are clear. The visualized bones and upper abdomen are   age-appropriate. Medical Decision Making   I am the first provider for this patient. I reviewed the vital signs, available nursing notes, past medical history, past surgical history, family history and social history. Vital Signs-Reviewed the patient's vital signs. Patient Vitals for the past 24 hrs:   Temp Pulse Resp BP SpO2   10/16/22 1359 -- (!) 107 23 117/64 --   10/16/22 1344 -- (!) 106 24 (!) 143/77 --   10/16/22 1314 -- (!) 106 22 (!) 148/79 --   10/16/22 1259 -- (!) 106 19 139/65 --   10/16/22 1244 98.1 °F (36.7 °C) (!) 106 19 (!) 142/66 98 %         Provider Notes (Medical Decision Making):   61-year-old male presenting with vomiting, diarrhea, chest pain and elevated blood glucose. Concern for possible DKA, dehydration, electrolyte or metabolic abnormalities, atypical ACS, gastritis or GERD, gastroenteritis. He is tachycardic, with Kussmaul respirations concerning for DKA and significant acidosis.     ED Course:     Initial assessment performed. The patients presenting problems have been discussed, and they are in agreement with the care plan formulated and outlined with them. I have encouraged them to ask questions as they arise throughout their visit. EKG is performed at 12: 39, shows sinus tachycardia at a rate of 105, , QRS 98, QTc 507, right axis deviation, no ST segment elevation or depression concerning for ACS, incomplete right bundle branch block, this is interpreted as sinus tachycardia with incomplete right bundle branch block. He is dry appearing on exam, is on dialysis, however still produces urine, saturating well, he develops closely the amount of fluid resuscitation, at this point, he appears volume depleted and would benefit from IV fluids, will start with 1 L. His lactic acid is not significantly elevated at 2. CBC with leukocytosis of 14, likely reactive and also in setting of vomiting. Basic metabolic panel with elevated BUN/creatinine of 60/5.29, consistent with history of ESRD, his potassium elevated at 5.5, he is elevated anion gap of 30 and bicarb is low at 12, consistent with metabolic acidosis, I suspect this is in the setting of DKA with added renal disease contributing, with glucose greater than 924, and comfortable respirations, he would benefit from insulin drip which is ordered. His troponin is reassuring at 17. Blood gas shows metabolic acidosis with pH of 7.23, bicarb low at 12, compensatory CO2 low at 27.3. Chest x-ray shows no acute findings. Reevaluation, the patient continues to be tachypneic, however more comfortable appearing, continues to be tachycardic but otherwise vitals reassuring. He will be admitted. His chart is reviewed, his last admission was last month, at that time, he was also in DKA, and noted to have intractable nausea vomiting from the DKA, and noted to be on dialysis Monday Wednesday Friday at that time.     Critical Care Time:     CRITICAL CARE NOTE :    3:34 PM    IMPENDING DETERIORATION -Respiratory, Cardiovascular, CNS, Metabolic, and Renal  ASSOCIATED RISK FACTORS - Hypotension, Shock, Hypoxia, Dysrhythmia, Metabolic changes, Dehydration, and Vascular Compromise  MANAGEMENT- Bedside Assessment and Supervision of Care  INTERPRETATION -  Xrays, Blood Gases, ECG, and Blood Pressure  INTERVENTIONS -IV fluids, insulin, antiemetics  CASE REVIEW - Hospitalist/Intensivist and Nursing  TREATMENT RESPONSE -Improved  PERFORMED BY - Self    NOTES   :  I have spent 50 minutes of critical care time involved in lab review, consultations with specialist, family decision- making, bedside attention and documentation. This time excludes time spent in any separate billed procedures. During this entire length of time I was immediately available to the patient . Vikas Cueva MD      Disposition:  Admit    PLAN:  1. Current Discharge Medication List        2.    Follow-up Information    None       Return to ED if worse     Diagnosis     Clinical Impression: Acute DKA, acute dehydration, acute nausea and vomiting in setting of above, acute atypical chest

## 2022-10-16 NOTE — H&P
Hospitalist Admission Note    NAME: Naomie Hallman   :  1980   MRN:  [id number]     Date/Time:  10/16/2022 3:43 PM    Patient PCP: Althea Avila MD  _____________________________________________________________________  Given the patient's current clinical presentation, I have a high level of concern for decompensation if discharged from the emergency department. Complex decision making was performed, which includes reviewing the patient's available past medical records, laboratory results, and x-ray films. My assessment of this patient's clinical condition and my plan of care is as follows. Assessment / Plan:    DM1, uncontrolled with DKA  -Patient reports that his insulin pump has been functioning appropriately. We will continue this with a basal rate of 0.7 as I anticipate he will come off the insulin drip sometime tonight as his sugars come down. -Given IV fluid bolus in ED. We will hold off on further IV fluids as he is ESRD  -Asked diabetes treatment center to help  -He follows with Dr. Lauri Rick  -Recent A1c 7.2    ESRD POA  -consult nephrology. Patient is MWF and did not miss any sessions PTA    Hypertonic hyponatremia POA due to hyperglycemia  Na 130 with glucose 924     Intractable N/V POA likely from DKA  -abdominal exam benign  -allow clears today.  Re assess in AM     Hx Severe esophageal/gastric inflammation POA  -noted on recent EGD  -start IV PPI     Essential HTN POA  -holding PO meds today, restart coreg, amlodipine, clonidine tomorrow if appropriate     Recent urinary retention   -He reports that he is voiding okay  -Continue Proscar and Flomax      Code Status: Full  Surrogate Decision Maker:    DVT Prophylaxis: Heparin subcu  GI Prophylaxis: not indicated    Baseline:         Subjective:   CHIEF COMPLAINT: Nausea and vomiting    HISTORY OF PRESENT ILLNESS:     Avery Rae is a 46 y.o. male with a history of type 1 diabetes mellitus and end-stage renal disease who presents with persistent nausea and vomiting. Patient was just recently in the hospital, 3 weeks ago for DKA and similarly presented with intractable nausea and vomiting. That episode was blamed on a malfunctioning insulin pump but the patient reports that his pump has been functioning well this past few days. 2 days ago he completed his Friday dialysis session without any issues. Yesterday morning he started to feel sick and started vomiting. He could not check his blood sugar because there was something wrong with his glucose monitor. He continued to experience nausea and vomiting all day yesterday and last night. With no relief, this morning he called for EMS. In the ER, blood sugar 924, sodium 130, potassium 5.5.,  Patient was started on an insulin drip and we were asked to admit for work up and evaluation of the above problems.      Past Medical History:   Diagnosis Date    Bipolar 1 disorder, depressed ([address]     Bipolar disorder ([address]     Chronic kidney disease, stage 3a ([address]     Depression     Diabetes ([address]     DKA, type 1 ([address] 1/27/2013    diagnosed age 23    DKA, type 1 ([address]     H/O noncompliance with medical treatment, presenting hazards to health     MRSA (methicillin resistant staph aureus) culture positive     MRSA (methicillin resistant Staphylococcus aureus)     Face    Noncompliance with medication regimen     Second hand smoke exposure     Seizure ([address]     Seizures ([address] 2006 or 2007    one episode during [facility]        Past Surgical History:   Procedure Laterality Date    HX HEENT      top left wisdom tooth    HX ORTHOPAEDIC Left     wrist; MCV    IR INSERT NON TUNL CVC OVER 5 YRS  9/7/2022    IR INSERT TUNL CVC W/O PORT OVER 5 YR  9/9/2022    UPPER GI ENDOSCOPY,BIOPSY  11/20/2018            Social History     Tobacco Use    Smoking status: Former     Packs/day: 0.10     Years: 16.00     Pack years: 1.60     Types: Cigarettes     Start date: 10/4/1999     Quit date: 2020     Years since quittin.6    Smokeless tobacco: Never   Substance Use Topics    Alcohol use: No        Family History   Problem Relation Age of Onset    Diabetes Mother     Diabetes Other         neice, type 1      No Known Allergies     Prior to Admission medications    Medication Sig Start Date End Date Taking? Authorizing Provider   DULoxetine (CYMBALTA) 60 mg capsule Take 1 capsule by mouth once daily 10/6/22   Juan Miguel Guillory MD   naloxone (Narcan) 4 mg/actuation nasal spray Use 1 spray intranasally, then discard. Repeat with new spray every 2 min as needed for opioid overdose symptoms, alternating nostrils. 22   Juan Miguel Guillory MD   rosuvastatin (CRESTOR) 10 mg tablet Take 1 Tablet by mouth nightly for 60 days. 22  Vicenta Pennington MD   bacitracin zinc (BACITRACIN) ointment Apply  to affected area two (2) times a day. 22   Juan Miguel Guillory MD   Ketone Blood Test strp 50 Each by Does Not Apply route as needed for PRN Reason (Other) (as needed for suspected dka). 22   Christa Cross MD   pantoprazole (PROTONIX) 40 mg tablet Take 1 Tablet by mouth Daily (before breakfast). 22   Josiane Givens MD   finasteride (PROSCAR) 5 mg tablet Take 1 Tablet by mouth in the morning. 22   Juan Miguel Guillory MD   insulin aspart U-100 (NovoLOG U-100 Insulin aspart) 100 unit/mL injection Use as instructed via insulin pump. Dx code E10.65 max daily dose 50U 7/15/22   Christa Cross MD   pregabalin (Lyrica) 75 mg capsule Take 1 Capsule by mouth two (2) times a day. Max Daily Amount: 150 mg. 22   Juan Miguel Guillory MD   Nora Bears (Ultra-Light Rollator) misc Use while ambulating 22   Juan Miguel Guillory MD   amLODIPine (NORVASC) 10 mg tablet Take 1 Tablet by mouth daily. 22   Juan Miguel Guillory MD   cloNIDine (CATAPRES) 0.1 mg/24 hr ptwk 1 Patch by TransDERmal route every seven (7) days.  22   Juan Miguel Guillory MD   Dexcom G6  misc Use with the dexcom device to check blood sugars 4 times a day 7/1/22   Odalis Aguilar MD   Dexcom G6 Sensor brandi Use as directed every 10 days 6/21/22   Odalis Aguilar MD   Dexcom G6 Transmitter brandi Use as directed 6/21/22   Odalis Aguilar MD   Insulin Needles, Disposable, 31 gauge x 5/16\" ndle Dx code E11.65, use 6x daily 6/21/22   Odalis Aguilar MD   carvediloL (COREG) 12.5 mg tablet Take 1 Tablet by mouth two (2) times daily (with meals). 5/19/22   Kwabena Jett MD   tamsulosin (FLOMAX) 0.4 mg capsule Take 0.4 mg by mouth daily. Provider, Historical       REVIEW OF SYSTEMS:       Total of 12 systems reviewed as follows:       POSITIVE= underlined text  Negative = text not underlined  General:  fever, chills, sweats, generalized weakness, weight loss/gain,      loss of appetite   Eyes:    blurred vision, eye pain, loss of vision, double vision  ENT:    rhinorrhea, pharyngitis   Respiratory:   cough, sputum production, SOB, [place], wheezing, pleuritic pain   Cardiology:   chest pain, palpitations, orthopnea, PND, edema, syncope   Gastrointestinal:  abdominal pain , N/V, diarrhea, dysphagia, constipation, bleeding   Genitourinary:  frequency, urgency, dysuria, hematuria, incontinence   Muskuloskeletal :  arthralgia, myalgia, back pain  Hematology:  easy bruising, nose or gum bleeding, lymphadenopathy   Dermatological: rash, ulceration, pruritis, color change / jaundice  Endocrine:   hot flashes or polydipsia   Neurological:  headache, dizziness, confusion, focal weakness, paresthesia,     Speech difficulties, memory loss, gait difficulty  Psychological: Feelings of anxiety, depression, agitation    Objective:   VITALS:    Visit Vitals  /64   Pulse (!) 107   Temp 98.1 °F (36.7 °C)   Resp 23   Ht 5' 8\" (1.727 m)   Wt 76.7 kg (169 lb 3.2 oz)   SpO2 98%   BMI 25.73 kg/m²       PHYSICAL EXAM:    General:    Alert, cooperative, no distress, appears stated age.      HEENT: Atraumatic, anicteric sclerae, pink conjunctivae     No oral ulcers, mucosa moist, throat clear, dentition fair  Neck:  Supple, symmetrical,  thyroid: non tender  Lungs:   Clear to auscultation bilaterally. No Wheezing or Rhonchi. No rales. Chest wall:  No tenderness  No Accessory muscle use. Heart:   Regular  rhythm,  No  murmur   No edema  Abdomen:   Soft, non-tender. Not distended. Bowel sounds normal  Extremities: No cyanosis. No clubbing,      Skin turgor normal, Capillary refill normal, Radial dial pulse 2+  Skin:     Not pale. Not Jaundiced  No rashes   Psych:  Good insight. Not depressed. Not anxious or agitated. Neurologic: EOMs intact. No facial asymmetry. No aphasia or slurred speech. Symmetrical strength, Sensation grossly intact. Alert and oriented X 4.     _______________________________________________________________________  Care Plan discussed with:    Comments   Patient x    Family      RN     Care Manager                    Consultant:      _______________________________________________________________________  Expected  Disposition:   Home with Family x   HH/PT/OT/RN    SNF/LTC    CATHIE    ________________________________________________________________________  TOTAL TIME: 48   Minutes    Critical Care Provided     Minutes non procedure based      Comments    x Reviewed previous records   >50% of visit spent in counseling and coordination of care  Discussion with patient and/or family and questions answered       Given the patient's current clinical presentation, I have a high level of concern for decompensation if discharged from the ED. Complex decision making was performed which includes reviewing the patient's available past medical records, laboratory results, and Xray films.  I have also directly communicated my plan and discussed this case with the involved ED physician.         ____________________________________________________________________  Phil Gerber MD    Please note that this dictation was completed with Dragon, the computer voice recognition software. Quite often unanticipated grammatical, syntax, homophones, and other interpretive errors are inadvertently transcribed by the computer software. Please disregard these errors. Please excuse any errors that have escaped final proofreading    Procedures: see electronic medical records for all procedures/Xrays and details which were not copied into this note but were reviewed prior to creation of Plan. LAB DATA REVIEWED:    Recent Results (from the past 24 hour(s))   CBC WITH AUTOMATED DIFF    Collection Time: 10/16/22  1:15 PM   Result Value Ref Range    WBC 14.0 (H) 4.1 - 11.1 K/uL    RBC 4.34 4.10 - 5.70 M/uL    HGB 11.4 (L) 12.1 - 17.0 g/dL    HCT 39.1 36.6 - 50.3 %    MCV 90.1 80.0 - 99.0 FL    MCH 26.3 26.0 - 34.0 PG    MCHC 29.2 (L) 30.0 - 36.5 g/dL    RDW 19.5 (H) 11.5 - 14.5 %    PLATELET 369 066 - 541 K/uL    MPV 11.5 8.9 - 12.9 FL    NRBC 0.0 0  WBC    ABSOLUTE NRBC 0.00 0.00 - 0.01 K/uL    NEUTROPHILS 88 (H) 32 - 75 %    LYMPHOCYTES 5 (L) 12 - 49 %    MONOCYTES 5 5 - 13 %    EOSINOPHILS 0 0 - 7 %    BASOPHILS 1 0 - 1 %    IMMATURE GRANULOCYTES 1 (H) 0.0 - 0.5 %    ABS. NEUTROPHILS 12.4 (H) 1.8 - 8.0 K/UL    ABS. LYMPHOCYTES 0.7 (L) 0.8 - 3.5 K/UL    ABS. MONOCYTES 0.7 0.0 - 1.0 K/UL    ABS. EOSINOPHILS 0.0 0.0 - 0.4 K/UL    ABS. BASOPHILS 0.1 0.0 - 0.1 K/UL    ABS. IMM.  GRANS. 0.1 (H) 0.00 - 0.04 K/UL    DF SMEAR SCANNED      RBC COMMENTS DEBBIE CELLS  2+        RBC COMMENTS ANISOCYTOSIS  1+       METABOLIC PANEL, COMPREHENSIVE    Collection Time: 10/16/22  1:15 PM   Result Value Ref Range    Sodium 130 (L) 136 - 145 mmol/L    Potassium 5.5 (H) 3.5 - 5.1 mmol/L    Chloride 88 (L) 97 - 108 mmol/L    CO2 12 (LL) 21 - 32 mmol/L    Anion gap 30 (H) 5 - 15 mmol/L    Glucose 924 (HH) 65 - 100 mg/dL    BUN 60 (H) 6 - 20 MG/DL    Creatinine 5.23 (H) 0.70 - 1.30 MG/DL    BUN/Creatinine ratio 11 (L) 12 - 20      eGFR 13 (L) >60 ml/min/1.73m2    Calcium 8.9 8.5 - 10.1 MG/DL    Bilirubin, total 0.5 0.2 - 1.0 MG/DL    ALT (SGPT) 27 12 - 78 U/L    AST (SGOT) 20 15 - 37 U/L    Alk. phosphatase 166 (H) 45 - 117 U/L    Protein, total 6.1 (L) 6.4 - 8.2 g/dL    Albumin 3.1 (L) 3.5 - 5.0 g/dL    Globulin 3.0 2.0 - 4.0 g/dL    A-G Ratio 1.0 (L) 1.1 - 2.2     MAGNESIUM    Collection Time: 10/16/22  1:15 PM   Result Value Ref Range    Magnesium 3.1 (H) 1.6 - 2.4 mg/dL   PHOSPHORUS    Collection Time: 10/16/22  1:15 PM   Result Value Ref Range    Phosphorus 8.6 (H) 2.6 - 4.7 MG/DL   SAMPLES BEING HELD    Collection Time: 10/16/22  1:15 PM   Result Value Ref Range    SAMPLES BEING HELD DRK GRN     COMMENT        Add-on orders for these samples will be processed based on acceptable specimen integrity and analyte stability, which may vary by analyte.    BLOOD GAS,CHEM8,LACTIC ACID POC    Collection Time: 10/16/22  1:20 PM   Result Value Ref Range    Calcium, ionized (POC) 1.00 (L) 1.12 - 1.32 mmol/L    BICARBONATE 12 mmol/L    Base deficit (POC) 14.3 mmol/L    Sample source VENOUS BLOOD      CO2, POC 12 (L) 19 - 24 MMOL/L    Sodium,  (L) 136 - 145 MMOL/L    Potassium, POC 5.3 3.5 - 5.5 MMOL/L    Chloride, POC 97 (L) 100 - 108 MMOL/L    Glucose, POC >700 (HH) 74 - 106 MG/DL    Creatinine, POC 4.4 (H) 0.6 - 1.3 MG/DL    Lactic Acid (POC) 2.00 0.40 - 2.00 mmol/L    Critical value read back MINVENDITTI     pH, venous (POC) 7.23 (L) 7.32 - 7.42      pCO2, venous (POC) 27.3 (L) 41 - 51 MMHG    pO2, venous (POC) 53 (H) 25 - 40 mmHg   GLUCOSTABILIZER    Collection Time: 10/16/22  2:03 PM   Result Value Ref Range    Glucose 601 mg/dL    Insulin order 10.8 units/hour    Insulin adminstered 10.8 units/hour    Multiplier 0.020     Low target 150 mg/dL    High target 200 mg/dL    D50 order 0.0 ml    D50 administered 0.00 ml    Minutes until next BG 60 min    Order initials LA     Administered initials LA    TROPONIN-HIGH SENSITIVITY    Collection Time: 10/16/22  2:06 PM   Result Value Ref Range    Troponin-High Sensitivity 17 0 - 76 ng/L   GLUCOSE, POC    Collection Time: 10/16/22  2:59 PM   Result Value Ref Range    Glucose (POC) >600 (HH) 65 - 117 mg/dL    Performed by Gia Joshi RN    GLUCOSTABILIZER    Collection Time: 10/16/22  3:03 PM   Result Value Ref Range    Glucose 601 mg/dL    Insulin order 16.2 units/hour    Insulin adminstered 16.2 units/hour    Multiplier 0.030     Low target 150 mg/dL    High target 200 mg/dL    D50 order 0.0 ml    D50 administered 0.00 ml    Minutes until next BG 60 min    Order initials LA     Administered initials LA

## 2022-10-16 NOTE — CONSULTS
NEPHROLOGY CONSULT NOTE     Patient: Claryce Goltz MRN: [id number]  PCP: Kasie Mccarthy MD   :     1980  Age:   46 y.o. Sex:  male      Referring physician: Ale Rangel MD  Reason for consultation: 46 y.o. male with DKA, type 1 ([facility] [C75.94] complicated by ESRD on HD  Admission Date: 10/16/2022 12:39 PM  LOS: 0 days      ASSESSMENT and PLAN :   ESRD on HD  -Dialyzes MWF [facility], patient of Dr. Cee Boggs  -Right chest permacath  -No urgent indication for hemodialysis this evening. Acidosis should improve on insulin drip and potassium along with that. Does not appear hypervolemic, no increased shortness of breath.  -Resume home MWF dialysis schedule, next treatment tomorrow  -HD orders placed. Potassium bath not ordered as this will probably change overnight, HD nurse to please call on-call with most recent labs  -[facility] notified    Hyperkalemia  -Potassium 5.3  -Will likely trend down overnight on insulin drip and with improvement of acidosis  -Continue to monitor with serial chemistries per DKA protocol    Anion gap metabolic acidosis  -Anion gap 30 serum bicarb 12 in the setting of DKA  -On insulin drip with serial labs    Anemia of chronic disease  -Hemoglobin 11.4, monitor    Hypertension      Care Plan discussed with: Patient, nurse        Thank you for consulting [facility] in the care of your patient. Subjective:   HPI: Claryce Goltz is a 46 y.o.  male with past medical history of ESRD on HD MWF, DKA, hypertension, BPH who has been admitted to the hospital for DKA. Presents to the emergency department this afternoon with complaint of nausea, vomiting, elevated blood sugars beginning last night. Evaluation emergency department revealed elevated blood sugar, anion gap acidosis consistent with DKA in setting of type 1 diabetes. Admitted to the hospitalist service on insulin drip.   Nephrology is consulted for management of ESRD on HD  -Dialyzes [facility], patient of Dr. Dinah Glass  -Compliant with dialysis this past week, no complications during Fridays dialysis  -Right chest PC  -As above, multiple episodes of nausea, vomiting, diarrhea. Received 1 L normal saline in the emergency department. At present, denies nausea, shortness of breath, chest pain, lower extremity edema  -Presenting blood sugar 924, anion gap 30, serum bicarb 12, potassium 5.5  -On insulin drip protocol with serial metabolic panels, next to be drawn shortly. Past Medical History:   Diagnosis Date    Bipolar 1 disorder, depressed ([address]     Bipolar disorder ([address]     Chronic kidney disease, stage 3a ([address]     Depression     Diabetes ([address]     DKA, type 1 ([address] 1/27/2013    diagnosed age 23    DKA, type 1 ([address]     H/O noncompliance with medical treatment, presenting hazards to health     MRSA (methicillin resistant staph aureus) culture positive     MRSA (methicillin resistant Staphylococcus aureus)     Face    Noncompliance with medication regimen     Second hand smoke exposure     Seizure ([address]     Seizures ([address] 2006 or 2007    one episode during shelter        Past Surgical History:   Procedure Laterality Date    HX HEENT      top left wisdom tooth    HX ORTHOPAEDIC Left     wrist; MCV    IR INSERT NON TUNL CVC OVER 5 YRS  9/7/2022    IR INSERT TUNL CVC W/O PORT OVER 5 YR  9/9/2022    UPPER GI ENDOSCOPY,BIOPSY  11/20/2018            No Known Allergies    Social Hx:    reports that he quit smoking about 2 years ago. His smoking use included cigarettes. He started smoking about 23 years ago. He has a 1.60 pack-year smoking history. He has never used smokeless tobacco. He reports that he does not drink alcohol and does not use drugs. Family History   Problem Relation Age of Onset    Diabetes Mother     Diabetes Other         neice, type 1        Review of Systems   Constitutional:  Positive for malaise/fatigue. Negative for chills and fever.    HENT: Negative for congestion and sore throat. Respiratory:  Negative for cough, shortness of breath and wheezing. Cardiovascular:  Negative for chest pain, palpitations and leg swelling. Gastrointestinal:  Positive for diarrhea, nausea and vomiting. Negative for abdominal pain, blood in stool, constipation, heartburn and melena. Genitourinary:  Negative for dysuria, flank pain, frequency, hematuria and urgency. Neurological:  Positive for weakness. Negative for dizziness and headaches. Objective:      I&O's:  No intake/output data recorded. Visit Vitals  BP (!) 137/57 (BP Patient Position: At rest;Semi fowlers)   Pulse (!) 103   Temp 98 °F (36.7 °C)   Resp 20   Ht 5' 8\" (1.727 m)   Wt 76.7 kg (169 lb 3.2 oz)   SpO2 99%   BMI 25.73 kg/m²       Physical Exam  Constitutional:       General: He is not in acute distress. Appearance: Normal appearance. He is well-developed. He is not ill-appearing or diaphoretic. HENT:      Head: Normocephalic. Eyes:      Extraocular Movements: Extraocular movements intact. Cardiovascular:      Rate and Rhythm: Normal rate and regular rhythm. Heart sounds: Normal heart sounds. No murmur heard. No friction rub. No gallop. Pulmonary:      Effort: Pulmonary effort is normal. No respiratory distress. Breath sounds: Normal breath sounds. No wheezing or rales. Abdominal:      General: Bowel sounds are normal. There is no distension. Palpations: Abdomen is soft. Tenderness: There is no abdominal tenderness. Musculoskeletal:      Right lower leg: No edema. Left lower leg: No edema. Skin:     General: Skin is warm and dry. Capillary Refill: Capillary refill takes less than 2 seconds. Coloration: Skin is not pale. Findings: No erythema or rash. Comments: Right chest PC   Neurological:      Mental Status: He is alert and oriented to person, place, and time.    Psychiatric:         Behavior: Behavior normal. Thought Content: Thought content normal.         Judgment: Judgment normal.       Laboratory Results:    Recent Labs     10/16/22  1315   *   K 5.5*   CL 88*   CO2 12*   *   BUN 60*   CREA 5.23*   CA 8.9   MG 3.1*   PHOS 8.6*   ALB 3.1*   ALT 27     Recent Labs     10/16/22  1315   WBC 14.0*   HGB 11.4*   HCT 39.1        Lab Results   Component Value Date/Time    Color YELLOW/STRAW 08/12/2022 12:40 AM    Appearance CLEAR 08/12/2022 12:40 AM    Specific gravity 1.017 08/12/2022 12:40 AM    Specific gravity 1.020 05/08/2022 09:06 PM    pH (UA) 5.5 08/12/2022 12:40 AM    Protein 300 (A) 08/12/2022 12:40 AM    Glucose >1,000 (A) 08/12/2022 12:40 AM    Ketone 15 (A) 08/12/2022 12:40 AM    Bilirubin Negative 08/12/2022 12:40 AM    Urobilinogen 0.2 08/12/2022 12:40 AM    Nitrites Negative 08/12/2022 12:40 AM    Leukocyte Esterase Negative 08/12/2022 12:40 AM    Epithelial cells FEW 08/12/2022 12:40 AM    Bacteria Negative 08/12/2022 12:40 AM    WBC 5-10 08/12/2022 12:40 AM    RBC 5-10 08/12/2022 12:40 AM     Recent Results (from the past 24 hour(s))   CBC WITH AUTOMATED DIFF    Collection Time: 10/16/22  1:15 PM   Result Value Ref Range    WBC 14.0 (H) 4.1 - 11.1 K/uL    RBC 4.34 4.10 - 5.70 M/uL    HGB 11.4 (L) 12.1 - 17.0 g/dL    HCT 39.1 36.6 - 50.3 %    MCV 90.1 80.0 - 99.0 FL    MCH 26.3 26.0 - 34.0 PG    MCHC 29.2 (L) 30.0 - 36.5 g/dL    RDW 19.5 (H) 11.5 - 14.5 %    PLATELET 430 247 - 305 K/uL    MPV 11.5 8.9 - 12.9 FL    NRBC 0.0 0  WBC    ABSOLUTE NRBC 0.00 0.00 - 0.01 K/uL    NEUTROPHILS 88 (H) 32 - 75 %    LYMPHOCYTES 5 (L) 12 - 49 %    MONOCYTES 5 5 - 13 %    EOSINOPHILS 0 0 - 7 %    BASOPHILS 1 0 - 1 %    IMMATURE GRANULOCYTES 1 (H) 0.0 - 0.5 %    ABS. NEUTROPHILS 12.4 (H) 1.8 - 8.0 K/UL    ABS. LYMPHOCYTES 0.7 (L) 0.8 - 3.5 K/UL    ABS. MONOCYTES 0.7 0.0 - 1.0 K/UL    ABS. EOSINOPHILS 0.0 0.0 - 0.4 K/UL    ABS. BASOPHILS 0.1 0.0 - 0.1 K/UL    ABS. IMM.  GRANS. 0.1 (H) 0.00 - 0.04 K/UL    DF SMEAR SCANNED      RBC COMMENTS DEBBIE CELLS  2+        RBC COMMENTS ANISOCYTOSIS  1+       METABOLIC PANEL, COMPREHENSIVE    Collection Time: 10/16/22  1:15 PM   Result Value Ref Range    Sodium 130 (L) 136 - 145 mmol/L    Potassium 5.5 (H) 3.5 - 5.1 mmol/L    Chloride 88 (L) 97 - 108 mmol/L    CO2 12 (LL) 21 - 32 mmol/L    Anion gap 30 (H) 5 - 15 mmol/L    Glucose 924 (HH) 65 - 100 mg/dL    BUN 60 (H) 6 - 20 MG/DL    Creatinine 5.23 (H) 0.70 - 1.30 MG/DL    BUN/Creatinine ratio 11 (L) 12 - 20      eGFR 13 (L) >60 ml/min/1.73m2    Calcium 8.9 8.5 - 10.1 MG/DL    Bilirubin, total 0.5 0.2 - 1.0 MG/DL    ALT (SGPT) 27 12 - 78 U/L    AST (SGOT) 20 15 - 37 U/L    Alk. phosphatase 166 (H) 45 - 117 U/L    Protein, total 6.1 (L) 6.4 - 8.2 g/dL    Albumin 3.1 (L) 3.5 - 5.0 g/dL    Globulin 3.0 2.0 - 4.0 g/dL    A-G Ratio 1.0 (L) 1.1 - 2.2     MAGNESIUM    Collection Time: 10/16/22  1:15 PM   Result Value Ref Range    Magnesium 3.1 (H) 1.6 - 2.4 mg/dL   PHOSPHORUS    Collection Time: 10/16/22  1:15 PM   Result Value Ref Range    Phosphorus 8.6 (H) 2.6 - 4.7 MG/DL   SAMPLES BEING HELD    Collection Time: 10/16/22  1:15 PM   Result Value Ref Range    SAMPLES BEING HELD DRK GRN     COMMENT        Add-on orders for these samples will be processed based on acceptable specimen integrity and analyte stability, which may vary by analyte.    BLOOD GAS,CHEM8,LACTIC ACID POC    Collection Time: 10/16/22  1:20 PM   Result Value Ref Range    Calcium, ionized (POC) 1.00 (L) 1.12 - 1.32 mmol/L    BICARBONATE 12 mmol/L    Base deficit (POC) 14.3 mmol/L    Sample source VENOUS BLOOD      CO2, POC 12 (L) 19 - 24 MMOL/L    Sodium,  (L) 136 - 145 MMOL/L    Potassium, POC 5.3 3.5 - 5.5 MMOL/L    Chloride, POC 97 (L) 100 - 108 MMOL/L    Glucose, POC >700 (HH) 74 - 106 MG/DL    Creatinine, POC 4.4 (H) 0.6 - 1.3 MG/DL    Lactic Acid (POC) 2.00 0.40 - 2.00 mmol/L    Critical value read back MINVENDITTI     pH, venous (POC) 7.23 (L) 7.32 - 7.42      pCO2, venous (POC) 27.3 (L) 41 - 51 MMHG    pO2, venous (POC) 53 (H) 25 - 40 mmHg   GLUCOSTABILIZER    Collection Time: 10/16/22  2:03 PM   Result Value Ref Range    Glucose 601 mg/dL    Insulin order 10.8 units/hour    Insulin adminstered 10.8 units/hour    Multiplier 0.020     Low target 150 mg/dL    High target 200 mg/dL    D50 order 0.0 ml    D50 administered 0.00 ml    Minutes until next BG 60 min    Order initials LA     Administered initials LA    TROPONIN-HIGH SENSITIVITY    Collection Time: 10/16/22  2:06 PM   Result Value Ref Range    Troponin-High Sensitivity 17 0 - 76 ng/L   GLUCOSE, POC    Collection Time: 10/16/22  2:59 PM   Result Value Ref Range    Glucose (POC) >600 (HH) 65 - 117 mg/dL    Performed by Nedra Borges RN    GLUCOSTABILIZER    Collection Time: 10/16/22  3:03 PM   Result Value Ref Range    Glucose 601 mg/dL    Insulin order 16.2 units/hour    Insulin adminstered 16.2 units/hour    Multiplier 0.030     Low target 150 mg/dL    High target 200 mg/dL    D50 order 0.0 ml    D50 administered 0.00 ml    Minutes until next BG 60 min    Order initials LA     Administered initials LA    GLUCOSE, POC    Collection Time: 10/16/22  3:58 PM   Result Value Ref Range    Glucose (POC) >600 (HH) 65 - 117 mg/dL    Performed by Nedra Borges RN    GLUCOSTABILIZER    Collection Time: 10/16/22  4:03 PM   Result Value Ref Range    Glucose 601 mg/dL    Insulin order 21.6 units/hour    Insulin adminstered 21.6 units/hour    Multiplier 0.040     Low target 150 mg/dL    High target 200 mg/dL    D50 order 0.0 ml    D50 administered 0.00 ml    Minutes until next BG 60 min    Order initials LA     Administered initials LA    GLUCOSE, POC    Collection Time: 10/16/22  5:17 PM   Result Value Ref Range    Glucose (POC) >600 (HH) 65 - 117 mg/dL    Performed by Tena TORIBIO    GLUCOSTABILIZER    Collection Time: 10/16/22  5:19 PM   Result Value Ref Range    Glucose 600 mg/dL    Insulin order 27.0 units/hour    Insulin adminstered 27.0 units/hour    Multiplier 0.050     Low target 150 mg/dL    High target 200 mg/dL    D50 order 0.0 ml    D50 administered 0.00 ml    Minutes until next BG 60 min    Order initials Sl     Administered initials Sl    GLUCOSE, POC    Collection Time: 10/16/22  6:25 PM   Result Value Ref Range    Glucose (POC) 507 (H) 65 - 117 mg/dL    Performed by Romeo Dillon RN    GLUCOSTABILIZER    Collection Time: 10/16/22  6:27 PM   Result Value Ref Range    Glucose 507 mg/dL    Insulin order 26.8 units/hour    Insulin adminstered 26.8 units/hour    Multiplier 0.060     Low target 150 mg/dL    High target 200 mg/dL    D50 order 0.0 ml    D50 administered 0.00 ml    Minutes until next BG 60 min    Order initials SL     Administered initials SL        I have reviewed the following all pertinent labs, microbiology data, radiology imaging, and home medications for my assessment     We will follow patient. Please dont hesitate to call with any questions.     Jayla Martinez NP, PALayC  [facility]      [facility]  [address] [address]  Phone: [phone]     [phone]   [facility]  [address]  [place]  [facility], [address]  Phone: [phone]     [phone]   [facility]  [address] [place], [address] [facility],[address]  [address]  Phone: [phone]     [phone]   [facility]OURDES CHERRY [address], [address]  Phone: [phone]     [phone]

## 2022-10-17 ENCOUNTER — APPOINTMENT (OUTPATIENT)
Dept: CT IMAGING | Age: 42
DRG: 420 | End: 2022-10-17
Attending: INTERNAL MEDICINE
Payer: MEDICAID

## 2022-10-17 LAB
ADMINISTERED INITIALS, ADMINIT: NORMAL
ANION GAP SERPL CALC-SCNC: 13 MMOL/L (ref 5–15)
ATRIAL RATE: 105 BPM
BUN SERPL-MCNC: 66 MG/DL (ref 6–20)
BUN/CREAT SERPL: 12 (ref 12–20)
CALCIUM SERPL-MCNC: 8.2 MG/DL (ref 8.5–10.1)
CALCULATED P AXIS, ECG09: 77 DEGREES
CALCULATED R AXIS, ECG10: 105 DEGREES
CALCULATED T AXIS, ECG11: 68 DEGREES
CHLORIDE SERPL-SCNC: 97 MMOL/L (ref 97–108)
CO2 SERPL-SCNC: 25 MMOL/L (ref 21–32)
CREAT SERPL-MCNC: 5.71 MG/DL (ref 0.7–1.3)
D50 ADMINISTERED, D50ADM: 0 ML
D50 ADMINISTERED, D50ADM: 14 ML
D50 ADMINISTERED, D50ADM: 22 ML
D50 ORDER, D50ORD: 0 ML
D50 ORDER, D50ORD: 14 ML
D50 ORDER, D50ORD: 22 ML
DIAGNOSIS, 93000: NORMAL
GLUCOSE BLD STRIP.AUTO-MCNC: 254 MG/DL (ref 65–117)
GLUCOSE BLD STRIP.AUTO-MCNC: 257 MG/DL (ref 65–117)
GLUCOSE BLD STRIP.AUTO-MCNC: 267 MG/DL (ref 65–117)
GLUCOSE BLD STRIP.AUTO-MCNC: 321 MG/DL (ref 65–117)
GLUCOSE BLD STRIP.AUTO-MCNC: 331 MG/DL (ref 65–117)
GLUCOSE BLD STRIP.AUTO-MCNC: 44 MG/DL (ref 65–117)
GLUCOSE BLD STRIP.AUTO-MCNC: 46 MG/DL (ref 65–117)
GLUCOSE BLD STRIP.AUTO-MCNC: 66 MG/DL (ref 65–117)
GLUCOSE BLD STRIP.AUTO-MCNC: 68 MG/DL (ref 65–117)
GLUCOSE BLD STRIP.AUTO-MCNC: 77 MG/DL (ref 65–117)
GLUCOSE BLD STRIP.AUTO-MCNC: 87 MG/DL (ref 65–117)
GLUCOSE BLD STRIP.AUTO-MCNC: 95 MG/DL (ref 65–117)
GLUCOSE SERPL-MCNC: 101 MG/DL (ref 65–100)
GLUCOSE, GLC: 44 MG/DL
GLUCOSE, GLC: 66 MG/DL
GLUCOSE, GLC: 95 MG/DL
HBV SURFACE AB SER QL: NONREACTIVE
HBV SURFACE AB SER-ACNC: <3.1 MIU/ML
HBV SURFACE AG SER QL: 0.16 INDEX
HBV SURFACE AG SER QL: NEGATIVE
HIGH TARGET, HITG: 200 MG/DL
INSULIN ADMINSTERED, INSADM: 0 UNITS/HOUR
INSULIN ADMINSTERED, INSADM: 0 UNITS/HOUR
INSULIN ADMINSTERED, INSADM: 1.2 UNITS/HOUR
INSULIN ORDER, INSORD: 0 UNITS/HOUR
INSULIN ORDER, INSORD: 0 UNITS/HOUR
INSULIN ORDER, INSORD: 1.2 UNITS/HOUR
LOW TARGET, LOT: 150 MG/DL
MINUTES UNTIL NEXT BG, NBG: 15 MIN
MINUTES UNTIL NEXT BG, NBG: 15 MIN
MINUTES UNTIL NEXT BG, NBG: 60 MIN
MULTIPLIER, MUL: 0.02
MULTIPLIER, MUL: 0.04
MULTIPLIER, MUL: 0.04
ORDER INITIALS, ORDINIT: NORMAL
P-R INTERVAL, ECG05: 184 MS
POTASSIUM SERPL-SCNC: 3.4 MMOL/L (ref 3.5–5.1)
Q-T INTERVAL, ECG07: 384 MS
QRS DURATION, ECG06: 98 MS
QTC CALCULATION (BEZET), ECG08: 507 MS
SERVICE CMNT-IMP: ABNORMAL
SERVICE CMNT-IMP: NORMAL
SODIUM SERPL-SCNC: 135 MMOL/L (ref 136–145)
VENTRICULAR RATE, ECG03: 105 BPM

## 2022-10-17 PROCEDURE — C9113 INJ PANTOPRAZOLE SODIUM, VIA: HCPCS | Performed by: INTERNAL MEDICINE

## 2022-10-17 PROCEDURE — 86706 HEP B SURFACE ANTIBODY: CPT

## 2022-10-17 PROCEDURE — 51798 US URINE CAPACITY MEASURE: CPT

## 2022-10-17 PROCEDURE — 74011250636 HC RX REV CODE- 250/636: Performed by: INTERNAL MEDICINE

## 2022-10-17 PROCEDURE — 74011636637 HC RX REV CODE- 636/637: Performed by: HOSPITALIST

## 2022-10-17 PROCEDURE — 65270000046 HC RM TELEMETRY

## 2022-10-17 PROCEDURE — 99233 SBSQ HOSP IP/OBS HIGH 50: CPT

## 2022-10-17 PROCEDURE — 87340 HEPATITIS B SURFACE AG IA: CPT

## 2022-10-17 PROCEDURE — 74011000250 HC RX REV CODE- 250: Performed by: INTERNAL MEDICINE

## 2022-10-17 PROCEDURE — 82962 GLUCOSE BLOOD TEST: CPT

## 2022-10-17 PROCEDURE — 74011000250 HC RX REV CODE- 250: Performed by: STUDENT IN AN ORGANIZED HEALTH CARE EDUCATION/TRAINING PROGRAM

## 2022-10-17 PROCEDURE — 80048 BASIC METABOLIC PNL TOTAL CA: CPT

## 2022-10-17 PROCEDURE — 90935 HEMODIALYSIS ONE EVALUATION: CPT

## 2022-10-17 PROCEDURE — 5A1D70Z PERFORMANCE OF URINARY FILTRATION, INTERMITTENT, LESS THAN 6 HOURS PER DAY: ICD-10-PCS | Performed by: INTERNAL MEDICINE

## 2022-10-17 PROCEDURE — 36415 COLL VENOUS BLD VENIPUNCTURE: CPT

## 2022-10-17 PROCEDURE — 74011250636 HC RX REV CODE- 250/636: Performed by: HOSPITALIST

## 2022-10-17 PROCEDURE — 74177 CT ABD & PELVIS W/CONTRAST: CPT

## 2022-10-17 RX ORDER — MORPHINE SULFATE 2 MG/ML
2 INJECTION, SOLUTION INTRAMUSCULAR; INTRAVENOUS
Status: DISCONTINUED | OUTPATIENT
Start: 2022-10-17 | End: 2022-10-25 | Stop reason: HOSPADM

## 2022-10-17 RX ORDER — CARVEDILOL 12.5 MG/1
12.5 TABLET ORAL 2 TIMES DAILY WITH MEALS
Status: DISCONTINUED | OUTPATIENT
Start: 2022-10-17 | End: 2022-10-19

## 2022-10-17 RX ORDER — HYDRALAZINE HYDROCHLORIDE 20 MG/ML
20 INJECTION INTRAMUSCULAR; INTRAVENOUS
Status: DISCONTINUED | OUTPATIENT
Start: 2022-10-17 | End: 2022-10-25 | Stop reason: HOSPADM

## 2022-10-17 RX ORDER — INSULIN LISPRO 100 [IU]/ML
INJECTION, SOLUTION INTRAVENOUS; SUBCUTANEOUS EVERY 4 HOURS
Status: DISCONTINUED | OUTPATIENT
Start: 2022-10-17 | End: 2022-10-20

## 2022-10-17 RX ORDER — DEXTROSE MONOHYDRATE 100 MG/ML
0-250 INJECTION, SOLUTION INTRAVENOUS AS NEEDED
Status: DISCONTINUED | OUTPATIENT
Start: 2022-10-17 | End: 2022-10-20

## 2022-10-17 RX ORDER — MAGNESIUM SULFATE 100 %
4 CRYSTALS MISCELLANEOUS AS NEEDED
Status: DISCONTINUED | OUTPATIENT
Start: 2022-10-17 | End: 2022-10-20

## 2022-10-17 RX ORDER — METOCLOPRAMIDE HYDROCHLORIDE 5 MG/ML
10 INJECTION INTRAMUSCULAR; INTRAVENOUS
Status: COMPLETED | OUTPATIENT
Start: 2022-10-17 | End: 2022-10-17

## 2022-10-17 RX ORDER — AMLODIPINE BESYLATE 5 MG/1
10 TABLET ORAL DAILY
Status: DISCONTINUED | OUTPATIENT
Start: 2022-10-18 | End: 2022-10-25 | Stop reason: HOSPADM

## 2022-10-17 RX ORDER — LIDOCAINE HYDROCHLORIDE 20 MG/ML
15 SOLUTION OROPHARYNGEAL AS NEEDED
Status: DISCONTINUED | OUTPATIENT
Start: 2022-10-17 | End: 2022-10-25 | Stop reason: HOSPADM

## 2022-10-17 RX ORDER — HYDRALAZINE HYDROCHLORIDE 20 MG/ML
20 INJECTION INTRAMUSCULAR; INTRAVENOUS ONCE
Status: COMPLETED | OUTPATIENT
Start: 2022-10-17 | End: 2022-10-17

## 2022-10-17 RX ORDER — MORPHINE SULFATE 2 MG/ML
2 INJECTION, SOLUTION INTRAMUSCULAR; INTRAVENOUS
Status: DISCONTINUED | OUTPATIENT
Start: 2022-10-17 | End: 2022-10-17

## 2022-10-17 RX ADMIN — ONDANSETRON 4 MG: 2 INJECTION INTRAMUSCULAR; INTRAVENOUS at 02:16

## 2022-10-17 RX ADMIN — HEPARIN SODIUM 5000 UNITS: 5000 INJECTION INTRAVENOUS; SUBCUTANEOUS at 05:34

## 2022-10-17 RX ADMIN — SODIUM CHLORIDE, PRESERVATIVE FREE 10 ML: 5 INJECTION INTRAVENOUS at 21:55

## 2022-10-17 RX ADMIN — HYDRALAZINE HYDROCHLORIDE 20 MG: 20 INJECTION INTRAMUSCULAR; INTRAVENOUS at 17:41

## 2022-10-17 RX ADMIN — HYDRALAZINE HYDROCHLORIDE 20 MG: 20 INJECTION INTRAMUSCULAR; INTRAVENOUS at 04:18

## 2022-10-17 RX ADMIN — DEXTROSE MONOHYDRATE 250 ML: 100 INJECTION, SOLUTION INTRAVENOUS at 01:05

## 2022-10-17 RX ADMIN — MORPHINE SULFATE 2 MG: 2 INJECTION, SOLUTION INTRAMUSCULAR; INTRAVENOUS at 22:05

## 2022-10-17 RX ADMIN — METOCLOPRAMIDE 10 MG: 5 INJECTION, SOLUTION INTRAMUSCULAR; INTRAVENOUS at 21:55

## 2022-10-17 RX ADMIN — SODIUM CHLORIDE, PRESERVATIVE FREE 10 ML: 5 INJECTION INTRAVENOUS at 05:37

## 2022-10-17 RX ADMIN — HEPARIN SODIUM 5000 UNITS: 5000 INJECTION INTRAVENOUS; SUBCUTANEOUS at 21:55

## 2022-10-17 RX ADMIN — Medication 3 UNITS: at 05:33

## 2022-10-17 RX ADMIN — ONDANSETRON 4 MG: 2 INJECTION INTRAMUSCULAR; INTRAVENOUS at 13:26

## 2022-10-17 RX ADMIN — HYDRALAZINE HYDROCHLORIDE 20 MG: 20 INJECTION INTRAMUSCULAR; INTRAVENOUS at 23:38

## 2022-10-17 RX ADMIN — HEPARIN SODIUM 5000 UNITS: 5000 INJECTION INTRAVENOUS; SUBCUTANEOUS at 16:29

## 2022-10-17 RX ADMIN — MORPHINE SULFATE 2 MG: 2 INJECTION, SOLUTION INTRAMUSCULAR; INTRAVENOUS at 17:35

## 2022-10-17 RX ADMIN — SODIUM CHLORIDE, PRESERVATIVE FREE 10 ML: 5 INJECTION INTRAVENOUS at 16:30

## 2022-10-17 RX ADMIN — DEXTROSE MONOHYDRATE 75 ML: 100 INJECTION, SOLUTION INTRAVENOUS at 02:58

## 2022-10-17 RX ADMIN — Medication 4 UNITS: at 16:29

## 2022-10-17 RX ADMIN — ONDANSETRON 4 MG: 2 INJECTION INTRAMUSCULAR; INTRAVENOUS at 08:33

## 2022-10-17 RX ADMIN — Medication 4 UNITS: at 21:55

## 2022-10-17 RX ADMIN — SODIUM CHLORIDE 40 MG: 9 INJECTION, SOLUTION INTRAMUSCULAR; INTRAVENOUS; SUBCUTANEOUS at 08:33

## 2022-10-17 RX ADMIN — ONDANSETRON 4 MG: 2 INJECTION INTRAMUSCULAR; INTRAVENOUS at 18:20

## 2022-10-17 RX ADMIN — Medication 3 UNITS: at 09:38

## 2022-10-17 NOTE — PROGRESS NOTES
End of Shift Note    Bedside shift change report given to [address] [place] [address],[address] (oncoming nurse) by Karlos Gray RN (offgoing nurse). Report included the following information SBAR    Shift worked:  7am-7pm     Shift summary and any significant changes:     Patient has HD cont vomiting Zofran has been given as order c/o chest soreness / pain MD notified morphine order given, administrated, pt off the unit for CT on abd. Hig BP PRN hydralazine PRN given       Concerns for physician to address:  [place]     Zone phone for oncoming shift:          Activity:  Activity Level: Up ad leeanne, Up with Assistance  Number times ambulated in hallways past shift: 0  Number of times OOB to chair past shift: 0    Cardiac:   Cardiac Monitoring: Yes      Cardiac Rhythm: Sinus Rhythm    Access:  Current line(s): PIV     Genitourinary:   Urinary status: voiding    Respiratory:   O2 Device: None (Room air)  Chronic home O2 use?: NO  Incentive spirometer at bedside: NO       GI:  Last Bowel Movement Date: 10/16/22  Current diet:  ADULT DIET Regular; 3 carb choices (45 gm/meal); Low Potassium (Less than 3000 mg/day); Low Phosphorus (Less than 1000 mg)  Passing flatus: YES  Tolerating current diet: YES       Pain Management:   Patient states pain is manageable on current regimen: YES    Skin:  Corey Score: 16  Interventions: float heels    Patient Safety:  Fall Score:  Total Score: 2  Interventions: bed/chair alarm       Length of Stay:  Expected LOS: 3d 19h  Actual LOS: 1      Ti Irene Torrez RN

## 2022-10-17 NOTE — PROGRESS NOTES
0102 Called to bedside by Primary RN for BS 44(pt asymptomatic). Pt currently on insulin gtt. Gtt held and initiated D10 protocol per glucostabilizer instructions. Pt given 250ml D10 over 20 mins verified with Pharmacy. 0142 BS rechecked and is now 95. Informed Primary RN to notify MD. Pt restarted on insulin gtt per glucostabilzer. 0216 Pt c/o of nausea. Zofran IV given     0255 BS retaken 1hr later and BS 66. Insulin gtt paused and d10 given per protocol. 75ml D10 given over 10mins. 0327 BS now 87. MD was notified by Primary RN and MD instructed to stop insulin gtt and get Stat BMP. MD Din instructed not to restart per Jodie Alexander but to wait till Stat BMP has resulted.

## 2022-10-17 NOTE — PROGRESS NOTES
2030 pt heart rate is elevated, BP elevated. Pt remains nausea and vomiting with Zophran on board. Contacted covering Doctor that ordered Reglan and Metoprolol to help with the BP.     [id number] pt responded to bp came down heart rate decrease bellow 100 and nausea subsided for a brief time.

## 2022-10-17 NOTE — PROGRESS NOTES
End of Shift Note    Bedside shift change report given to Ventura Edwards RN (oncoming nurse) by Walt Hale RN (offgoing nurse). Report included the following information SBAR, Kardex, ED Summary, Cardiac Rhythm ST, and Quality Measures    Shift worked:  [phone] (Admitted) -1900     Shift summary and any significant changes:     Checked Glucose every hour and changed insulin rate accordingly   PRN zofran given x1     Concerns for physician to address:  Repeated Readmission        Activity:  Activity Level: Up ad leeanne  Number times ambulated in hallways past shift: 1  Number of times OOB to chair past shift: 0    Cardiac:   Cardiac Monitoring: Yes      Cardiac Rhythm: Sinus Tachy    Access:  Current line(s): PIV     Genitourinary:   Urinary status: anuric    Respiratory:   O2 Device: None (Room air)  Chronic home O2 use?: NO  Incentive spirometer at bedside: NO       GI:  Last Bowel Movement Date: 10/16/22  Current diet:  ADULT DIET Clear Liquid  Passing flatus: YES  Tolerating current diet: NO       Pain Management:   Patient states pain is manageable on current regimen: YES    Skin:  Corey Score: 20  Interventions: increase time out of bed    Patient Safety:  Fall Score:  Total Score: 1  Interventions: pt to call before getting OOB       Length of Stay:  Expected LOS: - - -  Actual LOS: 0      Walt Hale RN

## 2022-10-17 NOTE — PROGRESS NOTES
Nephrology Progress Note  [facility] / [address], [place], [address]  Phone - [phone]  Fax - [phone]                 Patient: Yesica Stoddard                   YOB: 1980        Date- 10/17/2022                      Admit Date: 10/16/2022  CC: Follow up for esrd          IMPRESSION & PLAN:   Esrd- hd mwf at [facility]  Hyponatremia  Hyperkalemia  DKA  H/o Urinary retention requiring hansen cath  Type 1 diabetes  Hypertension  Anemia      PLAN-  Seen on hd today  No fluid removal on hd  Hold epogen     Subjective: Interval History:   -  k improved with hyperglyemia correction  Bp high  No sob    Objective:   Vitals:    10/17/22 0359 10/17/22 0400 10/17/22 0436 10/17/22 0800   BP: (!) 187/100  (!) 140/55 (!) 163/70   Pulse: (!) 101 96 96 (!) 103   Resp: 20 18 16   Temp:   98 °F (36.7 °C) 97.5 °F (36.4 °C)   SpO2:    98%   Weight:       Height:          10/16 0701 - 10/17 0700  In: 50 [P.O.:50]  Out: 2000 [Urine:350]  Last 3 Recorded Weights in this Encounter    10/16/22 1244   Weight: 76.7 kg (169 lb 3.2 oz)        Physical exam:    GEN: NAD  NECK- Supple, no mass  RESP: No wheezing, Clear b/l  CVS: S1,S2  RRR  NEURO: Normal speech, Non focal  PSYCH: Normal Mood  PERMACATH +  Chart reviewed. Pertinent Notes reviewed. Data Review :  Recent Labs     10/17/22  0322 10/16/22  1315   * 130*   K 3.4* 5.5*   CL 97 88*   CO2 25 12*   BUN 66* 60*   CREA 5.71* 5.23*   * 924*   CA 8.2* 8.9   MG  --  3.1*   PHOS  --  8.6*     Recent Labs     10/16/22  1315   WBC 14.0*   HGB 11.4*   HCT 39.1        No results for input(s): FE, TIBC, PSAT, FERR in the last 72 hours.    Lab Results   Component Value Date/Time    Hemoglobin A1c 7.2 (H) 09/26/2022 09:34 AM    Hemoglobin A1c 8.7 (H) 08/12/2022 12:41 AM    Hemoglobin A1c 12.1 (H) 07/10/2022 07:51 PM      Lab Results   Component Value Date/Time    Microalbumin/Creat ratio (mg/g creat) 11,439 (H) 04/01/2021 10:00 AM    Microalbumin,urine random 151.00 04/01/2021 10:00 AM     Lab Results   Component Value Date/Time    NT pro-BNP 5,688 (H) 09/06/2022 09:16 AM    NT pro-BNP 11,477 (H) 08/16/2022 12:50 AM    NT pro-BNP 4,600 (H) 08/09/2022 02:13 PM    NT pro- (H) 07/06/2021 09:52 AM     US Results (most recent):  Medication list  reviewed  Current Facility-Administered Medications   Medication Dose Route Frequency    lidocaine (XYLOCAINE) 2 % viscous solution 15 mL  15 mL Mouth/Throat PRN    benzocaine-menthoL (CEPACOL) lozenge 1 Lozenge  1 Lozenge Mucous Membrane PRN    insulin lispro (HUMALOG) injection   SubCUTAneous Q4H    glucose chewable tablet 16 g  4 Tablet Oral PRN    glucagon (GLUCAGEN) injection 1 mg  1 mg IntraMUSCular PRN    dextrose 10% infusion 0-250 mL  0-250 mL IntraVENous PRN    DULoxetine (CYMBALTA) capsule 60 mg  60 mg Oral DAILY    finasteride (PROSCAR) tablet 5 mg  5 mg Oral DAILY    tamsulosin (FLOMAX) capsule 0.4 mg  0.4 mg Oral DAILY    sodium chloride (NS) flush 5-40 mL  5-40 mL IntraVENous Q8H    sodium chloride (NS) flush 5-40 mL  5-40 mL IntraVENous PRN    acetaminophen (TYLENOL) tablet 650 mg  650 mg Oral Q6H PRN    Or    acetaminophen (TYLENOL) suppository 650 mg  650 mg Rectal Q6H PRN    polyethylene glycol (MIRALAX) packet 17 g  17 g Oral DAILY PRN    ondansetron (ZOFRAN ODT) tablet 4 mg  4 mg Oral Q8H PRN    Or    ondansetron (ZOFRAN) injection 4 mg  4 mg IntraVENous Q6H PRN    heparin (porcine) injection 5,000 Units  5,000 Units SubCUTAneous Q8H    pantoprazole (PROTONIX) 40 mg in 0.9% sodium chloride 10 mL injection  40 mg IntraVENous DAILY        Galo Jones MD  10/17/2022

## 2022-10-17 NOTE — PROGRESS NOTES
[id number] pt having issues with HR, BP, and Nausea and vomiting. (See Flow sheet.) Consulted covering MD who ordered Metoprolol and Reglan.     0102 Called Charge RN to bedside because pt blood sugar dropped to 44 repeat 46 to ask for assistance with D50 protocol suggested by the Glucose Reford [place] Notified Dr Jarad Smith about pt's drop in blood sugar and continue to follow Glucose stabilizer. 9789 Doctor orders a stat BMP    0310 doctor ordered to hold drip until BMP results    0329 Glucose stabilizer suggests to restart drip at 0.2, Doctor notified. Doctor still wanted to wait until BMP resulted    0406 pt Bp again is elevated notified doctor and he ordered Hydralazine. Pt responded well. 3344 Notified Dr Tiffanie Ward that the pt's BMP had resulted and what was the next coarse of action  65 Dr stated that he was no longer in DKA and he would discontinue the drip. He wrote orders for a glucose at 0500 and to cover sugar with Humalog. 7734 pt blood sugar was 254 pt was covered with 3 Units per [facility] End of Shift Note    Bedside shift change report given to Gallo Walsh (oncoming nurse) by Veto Wade RN (offgoing nurse).   Report included the following information SBAR, Kardex, and MAR    Shift worked:  [id number]     Shift summary and any significant changes:     Significant changes in MAR and Flow Sheet and Glucose Stabilizer     Concerns for physician to address:       Zone phone for oncoming shift:          Activity:  Activity Level: Up ad leeanne  Number times ambulated in hallways past shift: 0  Number of times OOB to chair past shift: 3    Cardiac:   Cardiac Monitoring: Yes      Cardiac Rhythm: Sinus Rhythm    Access:  Current line(s): PIV     Genitourinary:   Urinary status: voiding and oliguric    Respiratory:   O2 Device: None (Room air)  Chronic home O2 use?: NO  Incentive spirometer at bedside: NO       GI:  Last Bowel Movement Date: 10/16/22  Current diet:  ADULT DIET Regular; 3 carb choices (45 gm/meal); Low Potassium (Less than 3000 mg/day); Low Phosphorus (Less than 1000 mg)  Passing flatus: YES  Tolerating current diet: YES       Pain Management:   Patient states pain is manageable on current regimen: YES    Skin:  Corey Score: 20  Interventions: speciality bed, float heels, and increase time out of bed    Patient Safety:  Fall Score:  Total Score: 1  Interventions: bed/chair alarm       Length of Stay:  Expected LOS: - - -  Actual LOS: 1      Miguel Catalan RN

## 2022-10-17 NOTE — PROCEDURES
Hemodialysis / [phone]    Vitals Pre Post Assessment Pre Post   BP BP: (!) 193/108 (10/17/22 1153)   170/84 LOC AxOx4 AxOx4   HR Pulse (Heart Rate): (!) 102 (10/17/22 1153)   109 Lungs CTA Even and unlabored   Resp Resp Rate: 18 (10/17/22 1153) 20 Cardiac Tachy / denies CP/SOB C/o CP  Denies SOB  Dr Michael Grimaldo and primary RN aware   Temp Temp: 97.8 °F (36.6 °C) (10/17/22 1153) 97.9 Skin Warm/dry Warm/dry   Weight    Edema No edema No edema   Tele status remote remote Pain Pain Intensity 1: 0 (10/17/22 0800) 3     Orders   Duration: Start: 7485 End: 9907 Total: 3.25 hr   Dialyzer: Dialyzer/Set Up Inspection: Revaclear (10/17/22 1153)   K Bath: Dialysate K (mEq/L): 4 (10/17/22 1153)   Ca Bath: Dialysate CA (mEq/L): 2.5 (10/17/22 1153)   Na: Dialysate NA (mEq/L): 139 (10/17/22 1153)   Bicarb: Dialysate HCO3 (mEq/L): 38 (10/17/22 1153)   Target Fluid Removal: Goal/Amount of Fluid to Remove (mL): 0 mL (10/17/22 1153)     Access   Type & Location: R PC    Comments:                                     Right tunneled CVC, dressing changed and dated 10/17 - redness noted around catheter insertion site. Patient states slight tenderness but only when the catheter is moved in some way. MD notified. Each catheter limb disinfected for 60 seconds per limb with alcohol swabs.  Caps removed, dialysis CVC hub scrubbed with Prevantics for 5 seconds, followed by a 5 second dry time per Hospital P&P.   +aspirated/+flushed       Labs   HBsAg (Antigen) / date: 10/17/22 negative                                             HBsAb (Antibody) / date: 10/17/22 susceptible   Source: Epic   Obtained/Reviewed  Critical Results Called HGB   Date Value Ref Range Status   10/16/2022 11.4 (L) 12.1 - 17.0 g/dL Final     Potassium   Date Value Ref Range Status   10/17/2022 3.4 (L) 3.5 - 5.1 mmol/L Final     Comment:     INVESTIGATED PER DELTA CHECK PROTOCOL     Calcium   Date Value Ref Range Status   10/17/2022 8.2 (L) 8.5 - 10.1 MG/DL Final     BUN Date Value Ref Range Status   10/17/2022 66 (H) 6 - 20 MG/DL Final     Creatinine   Date Value Ref Range Status   10/17/2022 5.71 (H) 0.70 - 1.30 MG/DL Final        Meds Given   Name Dose Route   Heparin 1:1000 A 1.8 mL / V 1.8 mL               Adequacy / Fluid    Total Liters Process:    Net Fluid Removed:       Comments   Time Out Done:   (Time) 1145   Admitting Diagnosis: DKA   Consent obtained/signed: Informed Consent Verified: Yes (10/17/22 1153)   Machine / RO # Machine Number: [id number] (10/17/22 1153)   Primary Nurse Rpt Pre: Krissy Garcia RN   Primary Nurse Rpt Post: Krissy Garcia RN   Pt Education: Procedural / infection control   Care Plan: Continue current HD plan of care   Pts outpatient clinic: [facility] Summary   Comments:              7601 HD treatment initiated per physicians order. 1445 Patient c/o intermittent, non-radiating, mid-sternal chest pain, sinus tachy 111, Current /75. Skin is warm/dry, denies SOB. TORB to given  mL x1 now and continue HD.  1450 Patient states chest pain has resolved. Will continue to monitor. 1510 Patient states chest pain has returned. Dr Jaja Burtly notified. Orders to stop HD.  7068 HD treatment completed per physician order. All possible blood returned to patient. Each catheter limb disinfected for 60 seconds per limb with alcohol swabs. Dialysis CVC hub scrubbed with Prevantics for 5 seconds, followed by a 5 second dry time per Hospital P&P.   1520 Notified Dr aJja Richardson patient states CP is improved after rinseback but did not completely subside as it had before. TORB to give  mL bolus now.  +flushed/+heplock/+capped. CP still persistent but has decreased to 3/10 on pain scale. Primary RN notified of events, and nephrologist recommendation for Hospitalist to do cardiac work up. Verbalized understanding. Patient escorted back to room via patient transport.

## 2022-10-17 NOTE — DIABETES MGMT
[address] [place] [address]    CLINICAL NURSE SPECIALIST CONSULT     Initial Presentation   Priscilla Mcfadden is a 46 y.o. male admitted 10/16/2022 after experiencing nausea and elevated blood sugars. Hx of Type 1 diabetes. Hx of DKA. LAB: Glucose 924. Creatine 5.23. GFR 13. CXR:XR CHEST PORT:   Patient Communication     Add Comments   Not seen     Study Result    Narrative & Impression   EXAM:  XR CHEST PORT     INDICATION: Cough     COMPARISON: 9/27/2022     TECHNIQUE: Upright portable chest AP view     FINDINGS: A double lumen right IJ line is again shown terminating at the  cavoatrial junction. The cardiomediastinal and hilar contours are within normal  limits. Lungs and pleural margins are clear. The visualized bones and upper abdomen are  age-appropriate. IMPRESSION     No acute findings.    EKG, 12 LEAD, INITIAL  Order: [id number]  Status: Final result    Visible to patient: Yes (not seen)    Next appt: 10/25/2022 at 01:00 PM in Diabetes (Lindrith All, RD)    0 Result Notes  Component Ref Range & Units 1 d ago 3 wk ago   Ventricular Rate   89    Atrial Rate   89    P-R Interval ms 184  176    QRS Duration ms 98  106    Q-T Interval ms 384  402    QTC Calculation (Bezet) ms 507  489    Calculated P Axis degrees 77  56    Calculated R Axis degrees 105  48    Calculated T Axis degrees 68  49    Diagnosis  Sinus tachycardia   Rightward axis   Incomplete right bundle branch block   When compared with ECG of 26-SEP-2022 08:37,   QRS axis shifted right   ST elevation now present in Anterior leads   Confirmed by Quiana Morfin ([id number]) on 10/17/2022 11:32:38 AM  Normal sinus rhythm   Prolonged QT   When compared with ECG of 06-SEP-2022 11:35,   No significant change was found   Confirmed by Eliana Block ([id number]) on 9/28/2022 6:00:13 PM           HX:   Past Medical History:   Diagnosis Date    Bipolar 1 disorder, depressed ([address]     Bipolar disorder ([address]     Chronic kidney disease, stage 3a ([facility]     Depression     Diabetes ([facility]     DKA, type 1 ([facility] 1/27/2013    diagnosed age 23    DKA, type 1 ([facility]     H/O noncompliance with medical treatment, presenting hazards to health     MRSA (methicillin resistant staph aureus) culture positive     MRSA (methicillin resistant Staphylococcus aureus)     Face    Noncompliance with medication regimen     Second hand smoke exposure     Seizure ([facility]     Seizures ([facility] 2006 or 2007    one episode during alf        INITIAL DX:   DKA, type 1 ([facility] [E10.10]     Current Treatment     TX: Cymbalta. Proscar. Heparin. Insulin. Protonix. Flomax. Consulted by Provider for advanced diabetes nursing assessment and care for:   [x] Transitioning off [place]   [x] Inpatient management strategy  [] Home management assessment  [] Survival skill education    Hospital Course   Clinical progress has been uncomplicated. Diabetes History   The patient reports a 20 year history of Type 1 diabetes. He has a positive family hx of diabetes (mom & niece). He states today that he sees Dr. Arville Paget (endocrinologist) for his diabetes. He was recently started on a Tandem insulin pump on 07/15/22. Unsure of which CGM is in use. Diabetes-related Medical History  Acute complications  DKA  Neurological complications  Peripheral neuropathy  Microvascular disease  Retinopathy    Diabetes Medication History  Key Antihyperglycemic Medications               insulin aspart U-100 (NovoLOG U-100 Insulin aspart) 100 unit/mL injection Use as instructed via insulin pump.  Dx code E10.65 max daily dose 50U             Diabetes self-management practices:   Eating pattern   [] Eating a carbohydrate-controlled mealplan    Physical activity pattern   [x] Not employing a physical activity program to control BG    Monitoring pattern   [x] Testing BGs sufficiently to inform self-management adjustments                ( Using Dexcom G6)  Taking medications pattern  [x] Consistent administration  [x] Affordable  Social determinants of health impacting diabetes self-management practices   Concerned that you need to know more about how to stay healthy with diabetes  Overall evaluation:    [x] Unknown if achieving A1c target with drug therapy & self-care practices    Subjective   My dex-com sensor stopped working and my before I knew it my blood sugar was too high.      Objective   Physical exam  General Normal weight male in no acute distress.  Conversant and cooperative  Neuro  Alert, oriented   Vital Signs Visit Vitals  BP (!) 193/108   Pulse (!) 102   Temp 97.8 °F (36.6 °C) (Temporal)   Resp 18   Ht 5' 8\" (1.727 m)   Wt 76.7 kg (169 lb 3.2 oz)   SpO2 98%   BMI 25.73 kg/m²         Laboratory  Recent Labs     10/17/22  0322 10/16/22  1315   * 924*   AGAP 13 30*   WBC  --  14.0*   CREA 5.71* 5.23*   AST  --  20   ALT  --  27       Factors impacting BG management  Factor Dose Comments   Nutrition:  Standard meals     60 grams/meal      Pain PRN morphine    Other:   Kidney function  Liver function     Dialysis patient   Normal   M,W, F     Blood glucose pattern      Significant diabetes-related events over the past 24-72 hours  Transitioned off glucostabilizer with 3 units Humalog    Assessment and Plan   Nursing Diagnosis Risk for unstable blood glucose pattern   Nursing Intervention Domain 5250 Decision-making Support   Nursing Interventions Examined current inpatient diabetes/blood glucose control   Explored factors facilitating and impeding inpatient management  Explored corrective strategies with patient and responsible inpatient provider   Informed patient of rational for insulin strategy while hospitalized     Nursing Diagnosis 77917 Ineffective Health Management   Nursing Intervention Domain 5250 Decision-makingSupport   Nursing Interventions Identified diabetes self-management practices impeding diabetes control  Discussed diabetes survival skills related to  Healthy Plate eating plan; given handouts  Role of physical activity in improving insulin sensitivity and action  Procedure for blood glucose monitoring & options for low-cost products available from [facility]   Medications plan at discharge     Evaluation   This 46year old patient with Type 1 diabetes has a hx of DKA. The patient started a tandem insulin pump in July 2022. The patient reports issues with the Dex com sensor. The patient reports that the sensor may have come off while he was sleep. The insulin pump should alarm, ti notify the patient that the sensor is not operating. The insulin pump should also switch over to manual and deliver the pre-set basal dose programmed in the insulin pump. The patient has insulin pump education on file, but may require reinforcement. The patient may also benefit from using adhesive overlays to ensure the sensor will not come off at night. This was discussed with the patient. The patient does not have supplies on hand to resume the insulin pump. I recommend using scheduled  basal/bolus while hospitalized. This patient would benefit from diabetes self-management education and [facility].     Recommendations   Use 18 units Lantus daily ( to start this evening) and 4 units Humalog with each meal.       Billing Code(s)   [x] 16819 IP subsequent hospital care - 35 minutes [] 66305 Prolonged Services - 65 minutes [] 23166 Prolonged Services - 110 minutes  [] 80786 IP subsequent hospital care - 25 minutes [] 70199 Prolonged Services - 55 minutes [] 45696 Prolonged Services - 100 minutes  [] 40916 IP subsequent hospital care - 15 minutes [] 41723 Prolonged Services - 45 minutes [] 47171 Prolonged Services - 90 minutes    Before making these care recommendations, I personally reviewed the hospitalization record, including notes, laboratory & diagnostic data and current medications, and examined the patient at the bedside (circumstances permitting) before making care recommendations. More than fifty (50) percent of the time was spent in patient counseling and/or care coordination.   Total minutes: 35 minutes    Sandip Ruelas CNS  Diabetes Clinical Nurse Specialist  [facility]  Access via [email or web address]

## 2022-10-17 NOTE — PROGRESS NOTES
Problem: Patient Education: Go to Patient Education Activity  Goal: Patient/Family Education  Outcome: Progressing Towards Goal     Problem: Falls - Risk of  Goal: *Absence of Falls  Description: Document Johnnie Retana Fall Risk and appropriate interventions in the flowsheet.   Outcome: Progressing Towards Goal  Note: Fall Risk Interventions:  Mobility Interventions: Assess mobility with egress test         Medication Interventions: Assess postural VS orthostatic hypotension, Bed/chair exit alarm                   Problem: Patient Education: Go to Patient Education Activity  Goal: Patient/Family Education  Outcome: Progressing Towards Goal

## 2022-10-18 LAB
ADMINISTERED INITIALS, ADMINIT: NORMAL
ALBUMIN SERPL-MCNC: 2.6 G/DL (ref 3.5–5)
ALBUMIN/GLOB SERPL: 0.9 {RATIO} (ref 1.1–2.2)
ALP SERPL-CCNC: 134 U/L (ref 45–117)
ALT SERPL-CCNC: 22 U/L (ref 12–78)
ANION GAP SERPL CALC-SCNC: 12 MMOL/L (ref 5–15)
ANION GAP SERPL CALC-SCNC: 16 MMOL/L (ref 5–15)
ANION GAP SERPL CALC-SCNC: 17 MMOL/L (ref 5–15)
ANION GAP SERPL CALC-SCNC: 20 MMOL/L (ref 5–15)
ANION GAP SERPL CALC-SCNC: 21 MMOL/L (ref 5–15)
APPEARANCE UR: CLEAR
AST SERPL-CCNC: 20 U/L (ref 15–37)
BACTERIA URNS QL MICRO: NEGATIVE /HPF
BASOPHILS # BLD: 0 K/UL (ref 0–0.1)
BASOPHILS NFR BLD: 0 % (ref 0–1)
BILIRUB SERPL-MCNC: 0.5 MG/DL (ref 0.2–1)
BILIRUB UR QL: NEGATIVE
BUN SERPL-MCNC: 37 MG/DL (ref 6–20)
BUN SERPL-MCNC: 44 MG/DL (ref 6–20)
BUN SERPL-MCNC: 46 MG/DL (ref 6–20)
BUN/CREAT SERPL: 10 (ref 12–20)
CALCIUM SERPL-MCNC: 7.4 MG/DL (ref 8.5–10.1)
CALCIUM SERPL-MCNC: 7.6 MG/DL (ref 8.5–10.1)
CALCIUM SERPL-MCNC: 7.8 MG/DL (ref 8.5–10.1)
CALCIUM SERPL-MCNC: 8 MG/DL (ref 8.5–10.1)
CALCIUM SERPL-MCNC: 8.2 MG/DL (ref 8.5–10.1)
CHLORIDE SERPL-SCNC: 92 MMOL/L (ref 97–108)
CO2 SERPL-SCNC: 18 MMOL/L (ref 21–32)
CO2 SERPL-SCNC: 18 MMOL/L (ref 21–32)
CO2 SERPL-SCNC: 21 MMOL/L (ref 21–32)
CO2 SERPL-SCNC: 24 MMOL/L (ref 21–32)
CO2 SERPL-SCNC: 25 MMOL/L (ref 21–32)
COLOR UR: ABNORMAL
CREAT SERPL-MCNC: 3.75 MG/DL (ref 0.7–1.3)
CREAT SERPL-MCNC: 4.26 MG/DL (ref 0.7–1.3)
CREAT SERPL-MCNC: 4.51 MG/DL (ref 0.7–1.3)
CREAT SERPL-MCNC: 4.51 MG/DL (ref 0.7–1.3)
CREAT SERPL-MCNC: 4.73 MG/DL (ref 0.7–1.3)
D50 ADMINISTERED, D50ADM: 0 ML
D50 ORDER, D50ORD: 0 ML
DIFFERENTIAL METHOD BLD: ABNORMAL
EOSINOPHIL # BLD: 0 K/UL (ref 0–0.4)
EOSINOPHIL NFR BLD: 0 % (ref 0–7)
EPITH CASTS URNS QL MICRO: ABNORMAL /LPF
ERYTHROCYTE [DISTWIDTH] IN BLOOD BY AUTOMATED COUNT: 19.7 % (ref 11.5–14.5)
GLOBULIN SER CALC-MCNC: 3 G/DL (ref 2–4)
GLUCOSE BLD STRIP.AUTO-MCNC: 112 MG/DL (ref 65–117)
GLUCOSE BLD STRIP.AUTO-MCNC: 129 MG/DL (ref 65–117)
GLUCOSE BLD STRIP.AUTO-MCNC: 134 MG/DL (ref 65–117)
GLUCOSE BLD STRIP.AUTO-MCNC: 143 MG/DL (ref 65–117)
GLUCOSE BLD STRIP.AUTO-MCNC: 143 MG/DL (ref 65–117)
GLUCOSE BLD STRIP.AUTO-MCNC: 145 MG/DL (ref 65–117)
GLUCOSE BLD STRIP.AUTO-MCNC: 177 MG/DL (ref 65–117)
GLUCOSE BLD STRIP.AUTO-MCNC: 195 MG/DL (ref 65–117)
GLUCOSE BLD STRIP.AUTO-MCNC: 199 MG/DL (ref 65–117)
GLUCOSE BLD STRIP.AUTO-MCNC: 207 MG/DL (ref 65–117)
GLUCOSE BLD STRIP.AUTO-MCNC: 221 MG/DL (ref 65–117)
GLUCOSE BLD STRIP.AUTO-MCNC: 223 MG/DL (ref 65–117)
GLUCOSE BLD STRIP.AUTO-MCNC: 228 MG/DL (ref 65–117)
GLUCOSE BLD STRIP.AUTO-MCNC: 230 MG/DL (ref 65–117)
GLUCOSE BLD STRIP.AUTO-MCNC: 266 MG/DL (ref 65–117)
GLUCOSE BLD STRIP.AUTO-MCNC: 270 MG/DL (ref 65–117)
GLUCOSE BLD STRIP.AUTO-MCNC: 98 MG/DL (ref 65–117)
GLUCOSE SERPL-MCNC: 105 MG/DL (ref 65–100)
GLUCOSE SERPL-MCNC: 155 MG/DL (ref 65–100)
GLUCOSE SERPL-MCNC: 213 MG/DL (ref 65–100)
GLUCOSE SERPL-MCNC: 254 MG/DL (ref 65–100)
GLUCOSE SERPL-MCNC: 255 MG/DL (ref 65–100)
GLUCOSE UR STRIP.AUTO-MCNC: >1000 MG/DL
GLUCOSE, GLC: 112 MG/DL
GLUCOSE, GLC: 129 MG/DL
GLUCOSE, GLC: 134 MG/DL
GLUCOSE, GLC: 143 MG/DL
GLUCOSE, GLC: 143 MG/DL
GLUCOSE, GLC: 145 MG/DL
GLUCOSE, GLC: 177 MG/DL
GLUCOSE, GLC: 195 MG/DL
GLUCOSE, GLC: 199 MG/DL
GLUCOSE, GLC: 207 MG/DL
GLUCOSE, GLC: 223 MG/DL
GLUCOSE, GLC: 266 MG/DL
GLUCOSE, GLC: 98 MG/DL
HCT VFR BLD AUTO: 34.6 % (ref 36.6–50.3)
HGB BLD-MCNC: 10.7 G/DL (ref 12.1–17)
HGB UR QL STRIP: ABNORMAL
HIGH TARGET, HITG: 200 MG/DL
IMM GRANULOCYTES # BLD AUTO: 0.1 K/UL (ref 0–0.04)
IMM GRANULOCYTES NFR BLD AUTO: 1 % (ref 0–0.5)
INSULIN ADMINSTERED, INSADM: 0 UNITS/HOUR
INSULIN ADMINSTERED, INSADM: 0.8 UNITS/HOUR
INSULIN ADMINSTERED, INSADM: 0.9 UNITS/HOUR
INSULIN ADMINSTERED, INSADM: 1.5 UNITS/HOUR
INSULIN ADMINSTERED, INSADM: 2.3 UNITS/HOUR
INSULIN ADMINSTERED, INSADM: 2.7 UNITS/HOUR
INSULIN ADMINSTERED, INSADM: 2.8 UNITS/HOUR
INSULIN ADMINSTERED, INSADM: 3.3 UNITS/HOUR
INSULIN ADMINSTERED, INSADM: 4.1 UNITS/HOUR
INSULIN ORDER, INSORD: 0 UNITS/HOUR
INSULIN ORDER, INSORD: 0.8 UNITS/HOUR
INSULIN ORDER, INSORD: 0.9 UNITS/HOUR
INSULIN ORDER, INSORD: 1.5 UNITS/HOUR
INSULIN ORDER, INSORD: 2.3 UNITS/HOUR
INSULIN ORDER, INSORD: 2.7 UNITS/HOUR
INSULIN ORDER, INSORD: 2.8 UNITS/HOUR
INSULIN ORDER, INSORD: 3.3 UNITS/HOUR
INSULIN ORDER, INSORD: 4.1 UNITS/HOUR
KETONES UR QL STRIP.AUTO: 80 MG/DL
LACTATE SERPL-SCNC: 1.6 MMOL/L (ref 0.4–2)
LEUKOCYTE ESTERASE UR QL STRIP.AUTO: NEGATIVE
LIPASE SERPL-CCNC: 76 U/L (ref 73–393)
LOW TARGET, LOT: 150 MG/DL
LYMPHOCYTES # BLD: 0.8 K/UL (ref 0.8–3.5)
LYMPHOCYTES NFR BLD: 6 % (ref 12–49)
MAGNESIUM SERPL-MCNC: 2.3 MG/DL (ref 1.6–2.4)
MAGNESIUM SERPL-MCNC: 2.4 MG/DL (ref 1.6–2.4)
MCH RBC QN AUTO: 26.4 PG (ref 26–34)
MCHC RBC AUTO-ENTMCNC: 30.9 G/DL (ref 30–36.5)
MCV RBC AUTO: 85.2 FL (ref 80–99)
MINUTES UNTIL NEXT BG, NBG: 60 MIN
MONOCYTES # BLD: 1 K/UL (ref 0–1)
MONOCYTES NFR BLD: 8 % (ref 5–13)
MULTIPLIER, MUL: 0
MULTIPLIER, MUL: 0.01
MULTIPLIER, MUL: 0.02
NEUTS SEG # BLD: 10.6 K/UL (ref 1.8–8)
NEUTS SEG NFR BLD: 85 % (ref 32–75)
NITRITE UR QL STRIP.AUTO: NEGATIVE
NRBC # BLD: 0 K/UL (ref 0–0.01)
NRBC BLD-RTO: 0 PER 100 WBC
ORDER INITIALS, ORDINIT: NORMAL
PH UR STRIP: 6 [PH] (ref 5–8)
PHOSPHATE SERPL-MCNC: 6.4 MG/DL (ref 2.6–4.7)
PLATELET # BLD AUTO: 288 K/UL (ref 150–400)
PMV BLD AUTO: 10.7 FL (ref 8.9–12.9)
POTASSIUM SERPL-SCNC: 3.5 MMOL/L (ref 3.5–5.1)
POTASSIUM SERPL-SCNC: 3.6 MMOL/L (ref 3.5–5.1)
POTASSIUM SERPL-SCNC: 3.7 MMOL/L (ref 3.5–5.1)
POTASSIUM SERPL-SCNC: 4 MMOL/L (ref 3.5–5.1)
POTASSIUM SERPL-SCNC: 4.2 MMOL/L (ref 3.5–5.1)
PROCALCITONIN SERPL-MCNC: 12.51 NG/ML
PROT SERPL-MCNC: 5.6 G/DL (ref 6.4–8.2)
PROT UR STRIP-MCNC: >300 MG/DL
RBC # BLD AUTO: 4.06 M/UL (ref 4.1–5.7)
RBC #/AREA URNS HPF: ABNORMAL /HPF (ref 0–5)
RBC MORPH BLD: ABNORMAL
RBC MORPH BLD: ABNORMAL
SERVICE CMNT-IMP: ABNORMAL
SERVICE CMNT-IMP: NORMAL
SERVICE CMNT-IMP: NORMAL
SODIUM SERPL-SCNC: 129 MMOL/L (ref 136–145)
SODIUM SERPL-SCNC: 130 MMOL/L (ref 136–145)
SODIUM SERPL-SCNC: 130 MMOL/L (ref 136–145)
SODIUM SERPL-SCNC: 131 MMOL/L (ref 136–145)
SODIUM SERPL-SCNC: 132 MMOL/L (ref 136–145)
SP GR UR REFRACTOMETRY: 1.03
TROPONIN-HIGH SENSITIVITY: 133 NG/L (ref 0–76)
TROPONIN-HIGH SENSITIVITY: 137 NG/L (ref 0–76)
UR CULT HOLD, URHOLD: NORMAL
UROBILINOGEN UR QL STRIP.AUTO: 0.2 EU/DL (ref 0.2–1)
WBC # BLD AUTO: 12.5 K/UL (ref 4.1–11.1)
WBC URNS QL MICRO: ABNORMAL /HPF (ref 0–4)

## 2022-10-18 PROCEDURE — C9113 INJ PANTOPRAZOLE SODIUM, VIA: HCPCS | Performed by: INTERNAL MEDICINE

## 2022-10-18 PROCEDURE — 99231 SBSQ HOSP IP/OBS SF/LOW 25: CPT

## 2022-10-18 PROCEDURE — 74011250636 HC RX REV CODE- 250/636: Performed by: INTERNAL MEDICINE

## 2022-10-18 PROCEDURE — 83735 ASSAY OF MAGNESIUM: CPT

## 2022-10-18 PROCEDURE — 87086 URINE CULTURE/COLONY COUNT: CPT

## 2022-10-18 PROCEDURE — 83690 ASSAY OF LIPASE: CPT

## 2022-10-18 PROCEDURE — 84484 ASSAY OF TROPONIN QUANT: CPT

## 2022-10-18 PROCEDURE — 74011250637 HC RX REV CODE- 250/637: Performed by: INTERNAL MEDICINE

## 2022-10-18 PROCEDURE — 74011250636 HC RX REV CODE- 250/636: Performed by: HOSPITALIST

## 2022-10-18 PROCEDURE — 36415 COLL VENOUS BLD VENIPUNCTURE: CPT

## 2022-10-18 PROCEDURE — 74011000250 HC RX REV CODE- 250: Performed by: INTERNAL MEDICINE

## 2022-10-18 PROCEDURE — 74011000250 HC RX REV CODE- 250: Performed by: HOSPITALIST

## 2022-10-18 PROCEDURE — 85025 COMPLETE CBC W/AUTO DIFF WBC: CPT

## 2022-10-18 PROCEDURE — 74011000258 HC RX REV CODE- 258: Performed by: INTERNAL MEDICINE

## 2022-10-18 PROCEDURE — 80048 BASIC METABOLIC PNL TOTAL CA: CPT

## 2022-10-18 PROCEDURE — 80053 COMPREHEN METABOLIC PANEL: CPT

## 2022-10-18 PROCEDURE — 87040 BLOOD CULTURE FOR BACTERIA: CPT

## 2022-10-18 PROCEDURE — 74011636637 HC RX REV CODE- 636/637: Performed by: HOSPITALIST

## 2022-10-18 PROCEDURE — 83605 ASSAY OF LACTIC ACID: CPT

## 2022-10-18 PROCEDURE — 74011636637 HC RX REV CODE- 636/637: Performed by: INTERNAL MEDICINE

## 2022-10-18 PROCEDURE — 82962 GLUCOSE BLOOD TEST: CPT

## 2022-10-18 PROCEDURE — 84100 ASSAY OF PHOSPHORUS: CPT

## 2022-10-18 PROCEDURE — 81001 URINALYSIS AUTO W/SCOPE: CPT

## 2022-10-18 PROCEDURE — 65270000046 HC RM TELEMETRY

## 2022-10-18 PROCEDURE — 84145 PROCALCITONIN (PCT): CPT

## 2022-10-18 RX ORDER — INSULIN LISPRO 100 [IU]/ML
INJECTION, SOLUTION INTRAVENOUS; SUBCUTANEOUS
Status: DISCONTINUED | OUTPATIENT
Start: 2022-10-18 | End: 2022-10-20

## 2022-10-18 RX ORDER — METOPROLOL TARTRATE 5 MG/5ML
2.5 INJECTION INTRAVENOUS ONCE
Status: COMPLETED | OUTPATIENT
Start: 2022-10-18 | End: 2022-10-18

## 2022-10-18 RX ORDER — GUAIFENESIN 100 MG/5ML
81 LIQUID (ML) ORAL DAILY
Status: DISCONTINUED | OUTPATIENT
Start: 2022-10-19 | End: 2022-10-25 | Stop reason: HOSPADM

## 2022-10-18 RX ORDER — DEXTROSE MONOHYDRATE 50 MG/ML
100 INJECTION, SOLUTION INTRAVENOUS CONTINUOUS
Status: DISCONTINUED | OUTPATIENT
Start: 2022-10-18 | End: 2022-10-20

## 2022-10-18 RX ORDER — MAGNESIUM SULFATE 100 %
4 CRYSTALS MISCELLANEOUS AS NEEDED
Status: DISCONTINUED | OUTPATIENT
Start: 2022-10-18 | End: 2022-10-20

## 2022-10-18 RX ORDER — PROCHLORPERAZINE EDISYLATE 5 MG/ML
10 INJECTION INTRAMUSCULAR; INTRAVENOUS
Status: COMPLETED | OUTPATIENT
Start: 2022-10-18 | End: 2022-10-18

## 2022-10-18 RX ORDER — DEXTROSE MONOHYDRATE 100 MG/ML
0-250 INJECTION, SOLUTION INTRAVENOUS AS NEEDED
Status: DISCONTINUED | OUTPATIENT
Start: 2022-10-18 | End: 2022-10-20

## 2022-10-18 RX ADMIN — AMLODIPINE BESYLATE 10 MG: 5 TABLET ORAL at 09:30

## 2022-10-18 RX ADMIN — ONDANSETRON 4 MG: 2 INJECTION INTRAMUSCULAR; INTRAVENOUS at 01:27

## 2022-10-18 RX ADMIN — MORPHINE SULFATE 2 MG: 2 INJECTION, SOLUTION INTRAMUSCULAR; INTRAVENOUS at 11:43

## 2022-10-18 RX ADMIN — SODIUM CHLORIDE, PRESERVATIVE FREE 10 ML: 5 INJECTION INTRAVENOUS at 05:14

## 2022-10-18 RX ADMIN — HEPARIN SODIUM 5000 UNITS: 5000 INJECTION INTRAVENOUS; SUBCUTANEOUS at 16:22

## 2022-10-18 RX ADMIN — HEPARIN SODIUM 5000 UNITS: 5000 INJECTION INTRAVENOUS; SUBCUTANEOUS at 05:13

## 2022-10-18 RX ADMIN — CARVEDILOL 12.5 MG: 12.5 TABLET, FILM COATED ORAL at 09:30

## 2022-10-18 RX ADMIN — METOPROLOL TARTRATE 2.5 MG: 5 INJECTION, SOLUTION INTRAVENOUS at 01:28

## 2022-10-18 RX ADMIN — SODIUM CHLORIDE 1 G: 900 INJECTION INTRAVENOUS at 16:22

## 2022-10-18 RX ADMIN — Medication 2 UNITS: at 05:14

## 2022-10-18 RX ADMIN — MORPHINE SULFATE 2 MG: 2 INJECTION, SOLUTION INTRAMUSCULAR; INTRAVENOUS at 23:28

## 2022-10-18 RX ADMIN — TAMSULOSIN HYDROCHLORIDE 0.4 MG: 0.4 CAPSULE ORAL at 09:30

## 2022-10-18 RX ADMIN — SODIUM CHLORIDE, PRESERVATIVE FREE 10 ML: 5 INJECTION INTRAVENOUS at 14:00

## 2022-10-18 RX ADMIN — SODIUM CHLORIDE 4.1 UNITS/HR: 9 INJECTION, SOLUTION INTRAVENOUS at 10:42

## 2022-10-18 RX ADMIN — MORPHINE SULFATE 2 MG: 2 INJECTION, SOLUTION INTRAMUSCULAR; INTRAVENOUS at 20:24

## 2022-10-18 RX ADMIN — MORPHINE SULFATE 2 MG: 2 INJECTION, SOLUTION INTRAMUSCULAR; INTRAVENOUS at 04:35

## 2022-10-18 RX ADMIN — CARVEDILOL 12.5 MG: 12.5 TABLET, FILM COATED ORAL at 16:23

## 2022-10-18 RX ADMIN — FINASTERIDE 5 MG: 5 TABLET, FILM COATED ORAL at 09:30

## 2022-10-18 RX ADMIN — DEXTROSE MONOHYDRATE 75 ML/HR: 50 INJECTION, SOLUTION INTRAVENOUS at 15:03

## 2022-10-18 RX ADMIN — Medication 2 UNITS: at 00:52

## 2022-10-18 RX ADMIN — HEPARIN SODIUM 5000 UNITS: 5000 INJECTION INTRAVENOUS; SUBCUTANEOUS at 22:23

## 2022-10-18 RX ADMIN — Medication 3 UNITS: at 09:29

## 2022-10-18 RX ADMIN — DULOXETINE HYDROCHLORIDE 60 MG: 30 CAPSULE, DELAYED RELEASE ORAL at 09:30

## 2022-10-18 RX ADMIN — SODIUM CHLORIDE, PRESERVATIVE FREE 10 ML: 5 INJECTION INTRAVENOUS at 22:25

## 2022-10-18 RX ADMIN — ONDANSETRON 4 MG: 2 INJECTION INTRAMUSCULAR; INTRAVENOUS at 16:28

## 2022-10-18 RX ADMIN — PROCHLORPERAZINE EDISYLATE 10 MG: 5 INJECTION INTRAMUSCULAR; INTRAVENOUS at 05:40

## 2022-10-18 RX ADMIN — ONDANSETRON 4 MG: 2 INJECTION INTRAMUSCULAR; INTRAVENOUS at 09:25

## 2022-10-18 RX ADMIN — SODIUM CHLORIDE 40 MG: 9 INJECTION, SOLUTION INTRAMUSCULAR; INTRAVENOUS; SUBCUTANEOUS at 09:25

## 2022-10-18 NOTE — PROGRESS NOTES
Hospitalist Progress Note    NAME: Jemma Wray   :  1980   MRN:  [id number]     Admit date: 10/16/2022    Today's date: 10/18/22    PCP: Melissa Turcios MD      Anticipated discharge date: 10/19    Barriers:  new sepsis, elevated troponin    Assessment / Plan:    DKA POA Admitted with , HCO3 12 with AG 30  Insulin dependent DM type 1 POA on home insulin pump  Recent DKA from pump malfunction 2022  Admit started on insulin drip per Glucostabilizer protocol              Glycemic control improved and AG normalized  Delayed starting insulin pump after off gtt, Increased anion gap   Resumed insulin gtt till anion gap closes   D5W till back on pump  ? Trigger sepsis  Diabetic teaching  Last A1c= 7.2  Po diet as tolerated - diabetic restrictions  Appreciate diabetes treatment team input  Discharge on insulin pump    Elevated troponin 17 --> 137 --> 133  Setting of DKA and recurrent N/V  At risk for underlying CAD  ASA  Tele, serial labs  Last echo 2022 with LVEF 55-60%, normal wall motion   Normal RV function  Similar bump with DKA admit 2022, saw [facility]  Talk of stress test being done, cannot see that it was done or needed   Ask them to see patient in AM    Sepsis POA WBC 14.8, 106, lactate 1.6, procalcitonin 12.51  Possible UTI POA  CXR with no ASD or edema  CT abdomen/pelvis IMPRESSION  1. Questionable mild diffuse colitis. 2. Moderate distal esophagitis. 3. Trace pelvic free fluid. 4. Moderate diffuse bladder wall thickening. NS unable to get UA till today, was ordered since admit  UA 80 mg% ketones, 5 to 10 WBC, 5 to 10 RBCs, negative bacteria  Check blood and urine cultures  Empiric ceftriaxone and vancomycin pending the cultures    Recurrent N/V POA  Generalized abdominal pain POA  ? Related to DKA, DKA resolved, but persistent   ? Lagging improvement in the DKA  CT abdomen/pelvis IMPRESSION  1. Questionable mild diffuse colitis.   2. Moderate distal esophagitis. 3. Trace pelvic free fluid. 4. Moderate diffuse bladder wall thickening. Normal lipase  Suspect related to DKA     ESRD POA  Recently started on HD, MWF on 9/7/2022  Nephrology consulted for dialysis needs  Not fluid overloaded    Severe esophageal/gastric inflammation POA  Seen on previous EGD  IV PPI     Essential HTN POA  Resume Amlodipine, clonidine  Cont coreg     Recent urinary retention from prior admit POA hansen removed  Hansen in placed 1 month in august/September 2022  Urology concerned for ?? BPH and atonic bladder per consult  Removed last admit several weeks ago, follows with [facility] urology    Overweight POA Body mass index is 25.73 kg/m². Code status: Full  Prophylaxis: Hep SQ  Recommended Disposition: Home w/Family    Subjective:     Chief Complaint / Reason for Physician Visit  \"Still throwing up\". Discussed with RN events overnight. DKA recurrent, trouble getting insulin pump restarted, back on insulin gtt  Still with persistent N/V, wretching when I was in the room  Moderate generalized abdominal pain    Review of Systems:  Symptom Y/N Comments  Symptom Y/N Comments   Fever/Chills n   Chest Pain n    Poor Appetite    Edema     Cough n   Abdominal Pain y    Sputum    Joint Pain     SOB/JOHNSTON n   Headache     Nausea/vomit y   Tolerating PT/OT     Diarrhea n   Tolerating Diet n    Constipation    Other       Could NOT obtain due to:      Objective:     VITALS:   Last 24hrs VS reviewed since prior progress note.  Most recent are:  Patient Vitals for the past 24 hrs:   Temp Pulse Resp BP SpO2   10/18/22 1218 98.3 °F (36.8 °C) 91 16 (!) 153/62 97 %   10/18/22 0800 98.6 °F (37 °C) 100 18 (!) 172/78 98 %   10/18/22 0406 98.6 °F (37 °C) (!) 112 18 (!) 164/82 96 %   10/18/22 0054 -- (!) 113 -- (!) 176/82 --   10/17/22 2338 98.4 °F (36.9 °C) (!) 116 20 (!) 177/86 94 %   10/17/22 2006 98.9 °F (37.2 °C) (!) 111 18 (!) 164/84 95 %         Intake/Output Summary (Last 24 hours) at 10/18/2022 1610  Last data filed at 10/18/2022 0406  Gross per 24 hour   Intake 240 ml   Output 800 ml   Net -560 ml          Wt Readings from Last 12 Encounters:   10/16/22 76.7 kg (169 lb 3.2 oz)   09/28/22 75.8 kg (167 lb 3.2 oz)   09/13/22 72.1 kg (159 lb)   09/06/22 81.6 kg (180 lb)   08/25/22 83.2 kg (183 lb 6.4 oz)   08/17/22 91.4 kg (201 lb 8 oz)   08/09/22 82.7 kg (182 lb 5.1 oz)   07/14/22 73.9 kg (163 lb)   07/10/22 63.5 kg (140 lb)   06/25/22 71.6 kg (157 lb 13.6 oz)   06/21/22 66.5 kg (146 lb 9.6 oz)   06/07/22 72.6 kg (160 lb)       PHYSICAL EXAM:    I had a face to face encounter and independently examined this patient on 10/18/22 as outlined below:    General: WD, WN. Alert, cooperative, actively vomiting  EENT:  PERRL. Anicteric sclerae. MMM  Neck:  No meningismus, no thyromegaly  Resp:  CTA bilaterally, no wheezing or rales. No accessory muscle use  CV:  Regular  rhythm,  No edema  GI:  Soft, Non distended, generalized tender. +Bowel sounds, no rebound  Neurologic:  Alert and oriented X 3, normal speech, non focal motor exam  Psych:   Not anxious nor agitated  Skin:  No rashes. No jaundice    Reviewed most current lab test results and cultures  YES  Reviewed most current radiology test results   YES  Review and summation of old records today    NO  Reviewed patient's current orders and MAR    YES  PMH/SH reviewed - no change compared to H&P  ________________________________________________________________________  Care Plan discussed with:    Comments   Patient x    Family      RN x    Care Manager     Consultant                        Multidiciplinary team rounds were held today with , nursing, pharmacist and clinical coordinator. Patient's plan of care was discussed; medications were reviewed and discharge planning was addressed.      ________________________________________________________________________      Comments   >50% of visit spent in counseling and coordination of care     ________________________________________________________________________  Tuan Fontaine MD     Procedures: see electronic medical records for all procedures/Xrays and details which were not copied into this note but were reviewed prior to creation of Plan. LABS:  I reviewed today's most current labs and imaging studies. Pertinent labs include:  Recent Labs     10/18/22  0440 10/16/22  1315   WBC 12.5* 14.0*   HGB 10.7* 11.4*   HCT 34.6* 39.1    290       Recent Labs     10/18/22  1508 10/18/22  1050 10/18/22  0440 10/17/22  0322 10/16/22  1315   * 130* 131*   < > 130*   K 3.7 3.6 4.0   < > 5.5*   CL 92* 92* 92*   < > 88*   CO2 24 18* 18*   < > 12*   * 255* 254*   < > 924*   BUN 46* 44* 37*   < > 60*   CREA 4.51* 4.26* 3.75*   < > 5.23*   CA 8.0* 7.8* 8.2*   < > 8.9   MG 2.4 2.3  --   --  3.1*   PHOS  --  6.4*  --   --  8.6*   ALB  --   --  2.6*  --  3.1*   TBILI  --   --  0.5  --  0.5   ALT  --   --  22  --  27    < > = values in this interval not displayed.

## 2022-10-18 NOTE — PROGRESS NOTES
Spiritual Care Partner Volunteer visited patient at [address] in [facility] PROGRESSIVE CARE on 10/18/2022   Documented by:     Aundria Moritz, MPS, [facility], Staff [address]    [address] Paging Service  [phone]

## 2022-10-18 NOTE — PROGRESS NOTES
Problem: Diabetes Self-Management  Goal: *Disease process and treatment process  Description: Define diabetes and identify own type of diabetes; list 3 options for treating diabetes. Outcome: Progressing Towards Goal     Problem: Falls - Risk of  Goal: *Absence of Falls  Description: Document Leslie Sahu Fall Risk and appropriate interventions in the flowsheet.   Outcome: Progressing Towards Goal  Note: Fall Risk Interventions:  Mobility Interventions: Assess mobility with egress test, Bed/chair exit alarm         Medication Interventions: Assess postural VS orthostatic hypotension, Bed/chair exit alarm

## 2022-10-18 NOTE — PROGRESS NOTES
1900 Bedside and Verbal shift change report given to Jojo Hale, [facility] (oncoming nurse) by Greta Le RN (offgoing nurse). Report included the following information SBAR, Kardex, Intake/Output, MAR, Recent Results, and Cardiac Rhythm NSR/ST . 2142 Pt continues to have episodes of vomiting. Prn Zofran no yet available. Notified Dhaval Bell MD. Orders receved for 1x dose metoclopramide 10 mg IV.     0059 /82, PRN hydralazine not yet available. Notified Dhaval Bell MD. Orders received for 1x dose metoprolol 2.5 mg IV     0500 Pt continues to c/o nausea, PRN Zofran not yet available. Dhaval Bell MD notified. Orders received for 1x dose Compazine 10 mg IV. End of Shift Note    Bedside shift change report given to ASAD Francis (oncoming nurse) by Loris Kanner, RN (offgoing nurse). Report included the following information SBAR, Kardex, Intake/Output, MAR, and Cardiac Rhythm ST    Shift worked:  1900 - 0700     Shift summary and any significant changes:     See above. PRN Zofran, morphine, and hydralazine given as needed (see MAR for details). Concerns for physician to address:  Continues to have episodes of vomiting, nausea poorly controlled with Zofran. Trop 137, repeat ordered for 0740. Zone phone for oncoming shift:          Activity:  Activity Level: Up with Assistance  Number times ambulated in hallways past shift: 0  Number of times OOB to chair past shift: 0    Cardiac:   Cardiac Monitoring: Yes      Cardiac Rhythm: Sinus Tachy    Access:  Current line(s): PIV     Genitourinary:   Urinary status: voiding    Respiratory:   O2 Device: None (Room air)  Chronic home O2 use?: NO  Incentive spirometer at bedside: N/A       GI:  Last Bowel Movement Date: 10/16/22  Current diet:  ADULT DIET Regular; 3 carb choices (45 gm/meal); Low Potassium (Less than 3000 mg/day);  Low Phosphorus (Less than 1000 mg)  Passing flatus: YES  Tolerating current diet: NO       Pain Management:   Patient states pain is manageable on current regimen: YES    Skin:  Corey Score: 17  Interventions: increase time out of bed and nutritional support     Patient Safety:  Fall Score:  Total Score: 2  Interventions: bed/chair alarm, gripper socks, and pt to call before getting OOB       Length of Stay:  Expected LOS: 3d 19h  Actual LOS: 2      Angel Wallace RN

## 2022-10-18 NOTE — PROGRESS NOTES
1900 Bedside and Verbal shift change report given to Perlita Cali, 2450 [facility] (oncoming nurse) by Emelina Moreno RN (offgoing nurse). Report included the following information SBAR, Kardex, Intake/Output, MAR, Recent Results, and Cardiac Rhythm NSR .     0140 Pt c/o difficulty voiding, straight cathed 556 mL.     0147 Pt straight cathed without difficulty, 525 mL removed. 0325 /91, PRN hydralazine administered. [address] [place] [address], NP notified. No new orders received, will leave up to cardiology in AM.     0436 Pt c/o nausea and stated continuous glucose monitor from home was reading in the 60's. Briefly paused insulin gtt to check POC glucose. Reads 186 (same as previous check ~20 minutes prior). Insulin gtt resumed. PRN Zofran not yet available, notified Mehul Meier NP. Orders received for PRN Compazine. End of Shift Note    [facility] Randy Nguyen RN (offgoing nurse). Report included the following information SBAR, Kardex, Intake/Output, MAR, Recent Results, and Cardiac Rhythm NSR    Shift worked:  1900 - 0700     Shift summary and any significant changes:     See above. Remains on insulin gtt; anion gap came down to 12, but then re-increased to 15. BMP collected near end of shift pending. Concerns for physician to address:  Troponin 151; K 3.4     Zone phone for [facility] shift:          Activity:  Activity Level:  Up with Assistance  Number times ambulated in hallways past shift: 0  Number of times OOB to chair past shift: 0    Cardiac:   Cardiac Monitoring: Yes      Cardiac Rhythm: Sinus Rhythm    Access:  Current line(s): PIV     Genitourinary:   Urinary status: straight cath    Respiratory:   O2 Device: None (Room air)  Chronic home O2 use?: NO  Incentive spirometer at bedside: N/A       GI:  Last Bowel Movement Date: 10/16/22  Current diet:  ADULT DIET Clear Liquid  Passing flatus: YES  Tolerating current diet: YES       Pain Management:   Patient states pain is manageable on current regimen: YES    Skin:  Corey Score: 18  Interventions: increase time out of bed and nutritional support     Patient Safety:  Fall Score:  Total Score: 2  Interventions: bed/chair alarm, gripper socks, and pt to call before getting OOB       Length of Stay:  Expected LOS: 3d 19h  Actual LOS: 52 [facility], RN

## 2022-10-18 NOTE — PROGRESS NOTES
End of Shift Note    Bedside shift change report given to [address] [place] [address],[address] (oncoming nurse) by Johnathan Melara RN (offgoing nurse). Report included the following information SBAR    Shift worked:  7am-7pm     Shift summary and any significant changes:     Patient on insulin drips cont Anino gap elevated BMP sent q 4hrs    Concerns for physician to address:  N&V     Zone phone for oncoming shift:          Activity:  Activity Level: Up ad leeanne, Up with Assistance  Number times ambulated in hallways past shift: 0  Number of times OOB to chair past shift: 0    Cardiac:   Cardiac Monitoring: Yes      Cardiac Rhythm: Sinus Rhythm    Access:  Current line(s): PIV     Genitourinary:   Urinary status: voiding    Respiratory:   O2 Device: None (Room air)  Chronic home O2 use?: NO  Incentive spirometer at bedside: NO       GI:  Last Bowel Movement Date: 10/16/22  Current diet:  ADULT DIET Clear Liquid  Passing flatus: NO  Tolerating current diet: YES       Pain Management:   Patient states pain is manageable on current regimen: YES    Skin:  Corey Score: 17  Interventions: turn team    Patient Safety:  Fall Score:  Total Score: 2  Interventions: bed/chair alarm       Length of Stay:  Expected LOS: 3d 19h  Actual LOS: 2      Ti D ASAD Smith

## 2022-10-18 NOTE — PROGRESS NOTES
Nephrology Progress Note  [facility] [address], [place] Ferr[place], [address]  Phone - [phone]  Fax - [phone]                 Patient: Jemma Wray                   YOB: 1980        Date- 10/18/2022                      Admit Date: 10/16/2022  CC: Follow up for end-stage renal disease          IMPRESSION & PLAN:   End-stage renal disease- hd mwf at [facility]  Hyponatremia  Hyperkalemia  DKA  H/o Urinary retention requiring hansen cath  Type 1 diabetes  Hypertension  Anemia      PLAN-  No dialysis today   Resume home BP meds  Continue dialysis Monday Wednesday Friday  Hold epogen     Subjective: Interval History:   -Patient had dialysis yesterday   Complaint of nausea   Blood pressure is high   no sob    Objective:   Vitals:    10/17/22 2338 10/18/22 0054 10/18/22 0406 10/18/22 0800   BP: (!) 177/86 (!) 176/82 (!) 164/82 (!) 172/78   Pulse: (!) 116 (!) 113 (!) 112 100   Resp: 20 18 18   Temp: 98.4 °F (36.9 °C)  98.6 °F (37 °C) 98.6 °F (37 °C)   TempSrc:       SpO2: 94%  96% 98%   Weight:       Height:          10/17 0701 - 10/18 0700  In: 360 [P.O.:360]  Out: 1457 [Urine:1650]  Last 3 Recorded Weights in this Encounter    10/16/22 1244   Weight: 76.7 kg (169 lb 3.2 oz)        Physical exam:    GEN: NAD   NECK- Supple, no mass  RESP: No wheezing, clear bilaterally   CVS: S1,S2  RRR  NEURO: Normal speech, nonfocal  PSYCH: Normal Mood  PERMACATH +  Chart reviewed. Pertinent Notes reviewed.      Data Review :  Recent Labs     10/18/22  0440 10/17/22  0322 10/16/22  1315   * 135* 130*   K 4.0 3.4* 5.5*   CL 92* 97 88*   CO2 18* 25 12*   BUN 37* 66* 60*   CREA 3.75* 5.71* 5.23*   * 101* 924*   CA 8.2* 8.2* 8.9   MG  --   --  3.1*   PHOS  --   --  8.6*       Recent Labs     10/18/22  0440 10/16/22  1315   WBC 12.5* 14.0*   HGB 10.7* 11.4*   HCT 34.6* 39.1    290       No results for input(s): FE, TIBC, PSAT, FERR in the last 72 hours.    Lab Results   Component Value Date/Time    Hemoglobin A1c 7.2 (H) 09/26/2022 09:34 AM    Hemoglobin A1c 8.7 (H) 08/12/2022 12:41 AM    Hemoglobin A1c 12.1 (H) 07/10/2022 07:51 PM        Lab Results   Component Value Date/Time    Microalbumin/Creat ratio (mg/g creat) 11,439 (H) 04/01/2021 10:00 AM    Microalbumin,urine random 151.00 04/01/2021 10:00 AM     Lab Results   Component Value Date/Time    NT pro-BNP 5,688 (H) 09/06/2022 09:16 AM    NT pro-BNP 11,477 (H) 08/16/2022 12:50 AM    NT pro-BNP 4,600 (H) 08/09/2022 02:13 PM    NT pro- (H) 07/06/2021 09:52 AM     US Results (most recent):  Medication list  reviewed  Current Facility-Administered Medications   Medication Dose Route Frequency    insulin lispro (HUMALOG) injection   SubCUTAneous TIDAC    glucose chewable tablet 16 g  4 Tablet Oral PRN    glucagon (GLUCAGEN) injection 1 mg  1 mg IntraMUSCular PRN    dextrose 10% infusion 0-250 mL  0-250 mL IntraVENous PRN    insulin regular (NOVOLIN R, HUMULIN R) 100 Units in 0.9% sodium chloride 100 mL infusion  0-50 Units/hr IntraVENous TITRATE    lidocaine (XYLOCAINE) 2 % viscous solution 15 mL  15 mL Mouth/Throat PRN    benzocaine-menthoL (CEPACOL) lozenge 1 Lozenge  1 Lozenge Mucous Membrane PRN    insulin lispro (HUMALOG) injection   SubCUTAneous Q4H    glucose chewable tablet 16 g  4 Tablet Oral PRN    glucagon (GLUCAGEN) injection 1 mg  1 mg IntraMUSCular PRN    dextrose 10% infusion 0-250 mL  0-250 mL IntraVENous PRN    morphine injection 2 mg  2 mg IntraVENous Q3H PRN    amLODIPine (NORVASC) tablet 10 mg  10 mg Oral DAILY    carvediloL (COREG) tablet 12.5 mg  12.5 mg Oral BID WITH MEALS    hydrALAZINE (APRESOLINE) 20 mg/mL injection 20 mg  20 mg IntraVENous Q6H PRN    DULoxetine (CYMBALTA) capsule 60 mg  60 mg Oral DAILY    finasteride (PROSCAR) tablet 5 mg  5 mg Oral DAILY    tamsulosin (FLOMAX) capsule 0.4 mg  0.4 mg Oral DAILY sodium chloride (NS) flush 5-40 mL  5-40 mL IntraVENous Q8H    sodium chloride (NS) flush 5-40 mL  5-40 mL IntraVENous PRN    acetaminophen (TYLENOL) tablet 650 mg  650 mg Oral Q6H PRN    Or    acetaminophen (TYLENOL) suppository 650 mg  650 mg Rectal Q6H PRN    polyethylene glycol (MIRALAX) packet 17 g  17 g Oral DAILY PRN    ondansetron (ZOFRAN ODT) tablet 4 mg  4 mg Oral Q8H PRN    Or    ondansetron (ZOFRAN) injection 4 mg  4 mg IntraVENous Q6H PRN    heparin (porcine) injection 5,000 Units  5,000 Units SubCUTAneous Q8H    pantoprazole (PROTONIX) 40 mg in 0.9% sodium chloride 10 mL injection  40 mg IntraVENous DAILY        Latoya Taylor MD  10/18/2022

## 2022-10-18 NOTE — DIABETES MGMT
[address] [place] [address]    CLINICAL NURSE SPECIALIST CONSULT     Initial Presentation   Priscilla Mcfadden is a 46 y.o. male admitted 10/16/2022 after experiencing nausea and elevated blood sugars. Hx of Type 1 diabetes. Hx of DKA. LAB: Glucose 924. Creatine 5.23. GFR 13. CXR:XR CHEST PORT:   Patient Communication     Add Comments   Not seen     Study Result    Narrative & Impression   EXAM:  XR CHEST PORT     INDICATION: Cough     COMPARISON: 9/27/2022     TECHNIQUE: Upright portable chest AP view     FINDINGS: A double lumen right IJ line is again shown terminating at the  cavoatrial junction. The cardiomediastinal and hilar contours are within normal  limits. Lungs and pleural margins are clear. The visualized bones and upper abdomen are  age-appropriate. IMPRESSION     No acute findings.    EKG, 12 LEAD, INITIAL  Order: [id number]  Status: Final result    Visible to patient: Yes (not seen)    Next appt: 10/25/2022 at 01:00 PM in Diabetes ([place] All, RD)    0 Result Notes  Component Ref Range & Units 1 d ago 3 wk ago   Ventricular Rate   89    Atrial Rate   89    P-R Interval ms 184  176    QRS Duration ms 98  106    Q-T Interval ms 384  402    QTC Calculation (Bezet) ms 507  489    Calculated P Axis degrees 77  56    Calculated R Axis degrees 105  48    Calculated T Axis degrees 68  49    Diagnosis  Sinus tachycardia   Rightward axis   Incomplete right bundle branch block   When compared with ECG of 26-SEP-2022 08:37,   QRS axis shifted right   ST elevation now present in Anterior leads   Confirmed by Quiana Morfin ([id number]) on 10/17/2022 11:32:38 AM  Normal sinus rhythm   Prolonged QT   When compared with ECG of 06-SEP-2022 11:35,   No significant change was found   Confirmed by Eliana Block ([id number]) on 9/28/2022 6:00:13 PM           HX:   Past Medical History:   Diagnosis Date    Bipolar 1 disorder, depressed ([address]     Bipolar disorder ([address]     Chronic kidney disease, stage 3a ([facility]     Depression     Diabetes ([facility]     DKA, type 1 ([facility] 1/27/2013    diagnosed age 23    DKA, type 1 ([facility]     H/O noncompliance with medical treatment, presenting hazards to health     MRSA (methicillin resistant staph aureus) culture positive     MRSA (methicillin resistant Staphylococcus aureus)     Face    Noncompliance with medication regimen     Second hand smoke exposure     Seizure ([facility]     Seizures ([facility] 2006 or 2007    one episode during jail        INITIAL DX:   DKA, type 1 ([facility] [E10.10]     Current Treatment     TX: Cymbalta. Proscar. Heparin. Insulin. Protonix. Flomax. Consulted by Provider for advanced diabetes nursing assessment and care for:   [x] Transitioning off Galileo Fuchs   [x] Inpatient management strategy  [] Home management assessment  [] Survival skill education    Hospital Course   Clinical progress has been uncomplicated. Diabetes History   The patient reports a 20 year history of Type 1 diabetes. He has a positive family hx of diabetes (mom & niece). He states today that he sees Dr. Edgardo Goldstein (endocrinologist) for his diabetes. He was recently started on a Tandem insulin pump on 07/15/22. Unsure of which CGM is in use. Diabetes-related Medical History  Acute complications  DKA  Neurological complications  Peripheral neuropathy  Microvascular disease  Retinopathy    Diabetes Medication History  Key Antihyperglycemic Medications               insulin aspart U-100 (NovoLOG U-100 Insulin aspart) 100 unit/mL injection Use as instructed via insulin pump.  Dx code E10.65 max daily dose 50U             Diabetes self-management practices:   Eating pattern Deferred       Physical activity pattern   [x] Not employing a physical activity program to control BG    Monitoring pattern   [x] Testing BGs sufficiently to inform self-management adjustments                ( Using Dexcom G6)  Taking medications pattern  [x] Consistent administration  [x] Affordable  Social determinants of health impacting diabetes self-management practices   Concerned that you need to know more about how to stay healthy with diabetes  Overall evaluation:    [x] Unknown if achieving A1c target with drug therapy & self-care practices    Subjective   \" I have my supplies in that bag     Objective   Physical exam  General Normal weight male in no acute distress.  Conversant and cooperative  Neuro  Alert, oriented   Vital Signs Visit Vitals  BP (!) 172/78 (BP 1 Location: Right upper arm, BP Patient Position: At rest)   Pulse 100   Temp 98.6 °F (37 °C)   Resp 18   Ht 5' 8\" (1.727 m)   Wt 76.7 kg (169 lb 3.2 oz)   SpO2 98%   BMI 25.73 kg/m²         Laboratory  Recent Labs     10/18/22  1050 10/18/22  0440 10/17/22  0322 10/16/22  1315   * 254* 101* 924*   AGAP 20* 21* 13 30*   WBC  --  12.5*  --  14.0*   CREA 4.26* 3.75* 5.71* 5.23*   AST  --  20  --  20   ALT  --  22  --  27         Factors impacting BG management  Factor Dose Comments   Nutrition:  Standard meals     CL        Pain PRN morphine    Other:   Kidney function  Liver function     Dialysis patient   Normal   M,W, F     Blood glucose pattern      Significant diabetes-related events over the past 24-72 hours  Transitioned off glucostabilizer with 3 units Humalog    Assessment and Plan   Nursing Diagnosis Risk for unstable blood glucose pattern   Nursing Intervention Domain 5250 Decision-making Support   Nursing Interventions Examined current inpatient diabetes/blood glucose control   Explored factors facilitating and impeding inpatient management  Explored corrective strategies with patient and responsible inpatient provider   Informed patient of rational for insulin strategy while hospitalized     Nursing Diagnosis 06326 Ineffective Health Management   Nursing Intervention Domain 5250 Decision-makingSupport   Nursing Interventions Identified diabetes self-management practices impeding diabetes control  Discussed diabetes survival skills related to  Healthy Plate eating plan; given handouts  Role of physical activity in improving insulin sensitivity and action  Procedure for blood glucose monitoring & options for low-cost products available from [facility]   Medications plan at discharge     Evaluation   This 46year old patient with Type 1 diabetes has a hx of DKA. The patient started a tandem insulin pump in July 2022. The patient reports issues with the Dex com sensor. The patient reports that the sensor may have come off while he was sleep. The insulin pump should alarm, ti notify the patient that the sensor is not operating. The insulin pump should also switch over to manual and deliver the pre-set basal dose programmed in the insulin pump. The patient has insulin pump education on file, but may require reinforcement. The patient may also benefit for from using adhesive overlays to ensure the sensor will not come off at night. This was discussed with the patient. The patient does not have supplies on hand to resume the insulin pump. I recommend using scheduled  basal/bolus while hospitalized. This patient would benefit from diabetes self-management education and support DAVID RAMIREZ [facility]) after discharge. BG's are elevated this morning. The patient has not received basal insulin since transitioning off glucostabilizer. The patient had all supplies and  for Tandem insulin pump brought to him. He was able to restart his insulin pump with the previous settings, set by his endocrinologist. The patient is alert and oriented and proficient to use and operate his own insulin pump. I was notified that the anion gap is back open and the patient will return to [facility]. I recommend that the patient remain on glucostabilizer until the the anion gap is closed x 2. At that time the patient can restart his pump.  After starting the patient back on his own insulin pump, the patient should remain on the glucostabilizer for 2 hours before stopping it. Nurses notified and verbalize understanding. Recommendations   Continue on glucostabilizer until anion gap is closed x 2. Billing Code(s)   [] [id number] IP subsequent hospital care - 35 minutes [] [id number] Prolonged Services - 65 minutes [] 10041 Prolonged Services - 110 minutes  [] [id number] IP subsequent hospital care - 25 minutes [] 17284 Prolonged Services - 55 minutes [] 96271 Prolonged Services - 100 minutes  [x] 41860 IP subsequent hospital care - 15 minutes [] 20157 Prolonged Services - 45 minutes [] 23468 Prolonged Services - 90 minutes    Before making these care recommendations, I personally reviewed the hospitalization record, including notes, laboratory & diagnostic data and current medications, and examined the patient at the bedside (circumstances permitting) before making care recommendations. More than fifty (50) percent of the time was spent in patient counseling and/or care coordination.   Total minutes: 15 minutes    ANEESH Pfeiffer  Diabetes Clinical Nurse Specialist  [facility]  Access via [employer]

## 2022-10-18 NOTE — PROGRESS NOTES
1030 patient stop his insulin pap  to start insulin drip. 1030 FSBS  266 patient initial dose 4.1 will cont monitor. 1400 FSBS= 129 insulin drip hold an notified DR Xiong Bloodgood and  order given to start D5% on 75ml and cont. Motoring FSBS. 1500 FSBS 121 cont. Hold insuline   1617 increase D5% to 100 ml/hr recheck FSBS 98 also pt start Rocephin 1gm  will cont monitoring   1711 FSBS 143 cont. hold insulin drip cont. D5% infusion .   1818 FSBS 207 insulin drip resumed  by 1.5unt /h.

## 2022-10-18 NOTE — PROGRESS NOTES
CM made effort to contact pt to complete readmission assessment. Pt was off from floor for testing . Unable to complete readmission CM assessment.      Marianna Cota MSW     Ext [phone]

## 2022-10-18 NOTE — DIABETES MGMT
[address] [place] [address]    CLINICAL NURSE SPECIALIST CONSULT     Initial Presentation   Srinivasan Starkey is a 46 y.o. male admitted 10/16/2022 after experiencing nausea and elevated blood sugars. Hx of Type 1 diabetes. Hx of DKA. LAB: Glucose 924. Creatine 5.23. GFR 13. CXR:XR CHEST PORT:   Patient Communication     Add Comments   Not seen     Study Result    Narrative & Impression   EXAM:  XR CHEST PORT     INDICATION: Cough     COMPARISON: 9/27/2022     TECHNIQUE: Upright portable chest AP view     FINDINGS: A double lumen right IJ line is again shown terminating at the  cavoatrial junction. The cardiomediastinal and hilar contours are within normal  limits. Lungs and pleural margins are clear. The visualized bones and upper abdomen are  age-appropriate. IMPRESSION     No acute findings.    EKG, 12 LEAD, INITIAL  Order: [id number]  Status: Final result    Visible to patient: Yes (not seen)    Next appt: 10/25/2022 at 01:00 PM in Diabetes (Gian Murphy, LUCY)    0 Result Notes  Component Ref Range & Units 1 d ago 3 wk ago   Ventricular Rate   89    Atrial Rate   89    P-R Interval ms 184  176    QRS Duration ms 98  106    Q-T Interval ms 384  402    QTC Calculation (Bezet) ms 507  489    Calculated P Axis degrees 77  56    Calculated R Axis degrees 105  48    Calculated T Axis degrees 68  49    Diagnosis  Sinus tachycardia   Rightward axis   Incomplete right bundle branch block   When compared with ECG of 26-SEP-2022 08:37,   QRS axis shifted right   ST elevation now present in Anterior leads   Confirmed by Dmitri Barnard ([id number]) on 10/17/2022 11:32:38 AM  Normal sinus rhythm   Prolonged QT   When compared with ECG of 06-SEP-2022 11:35,   No significant change was found   Confirmed by Sarina [place] Baldwin ([id number]) on 9/28/2022 6:00:13 PM           HX:   Past Medical History:   Diagnosis Date    Bipolar 1 disorder, depressed ([address]     Bipolar disorder ([address]     Chronic kidney disease, stage 3a ([facility]     Depression     Diabetes ([facility]     DKA, type 1 ([facility] 1/27/2013    diagnosed age 23    DKA, type 1 ([facility]     H/O noncompliance with medical treatment, presenting hazards to health     MRSA (methicillin resistant staph aureus) culture positive     MRSA (methicillin resistant Staphylococcus aureus)     Face    Noncompliance with medication regimen     Second hand smoke exposure     Seizure ([facility]     Seizures ([facility] 2006 or 2007    one episode during half-way        INITIAL DX:   DKA, type 1 ([facility] [E10.10]     Current Treatment     TX: Cymbalta. Proscar. Heparin. Insulin. Protonix. Flomax. Consulted by Provider for advanced diabetes nursing assessment and care for:   [x] Transitioning off Flordia Oswaldo   [x] Inpatient management strategy  [] Home management assessment  [] Survival skill education    Hospital Course   Clinical progress has been uncomplicated. Diabetes History   The patient reports a 20 year history of Type 1 diabetes. He has a positive family hx of diabetes (mom & niece). He states today that he sees Dr. Tonia Joshua (endocrinologist) for his diabetes. He was recently started on a Tandem insulin pump on 07/15/22. Unsure of which CGM is in use. Diabetes-related Medical History  Acute complications  DKA  Neurological complications  Peripheral neuropathy  Microvascular disease  Retinopathy    Diabetes Medication History  Key Antihyperglycemic Medications               insulin aspart U-100 (NovoLOG U-100 Insulin aspart) 100 unit/mL injection Use as instructed via insulin pump.  Dx code E10.65 max daily dose 50U             Diabetes self-management practices:   Eating pattern Deferred       Physical activity pattern   [x] Not employing a physical activity program to control BG    Monitoring pattern   [x] Testing BGs sufficiently to inform self-management adjustments                ( Using Dexcom G6)  Taking medications pattern  [x] Consistent administration  [x] Affordable  Social determinants of health impacting diabetes self-management practices   Concerned that you need to know more about how to stay healthy with diabetes  Overall evaluation:    [x] Unknown if achieving A1c target with drug therapy & self-care practices    Subjective   \" I have my supplies in that bag     Objective   Physical exam  General Normal weight male in no acute distress.  Conversant and cooperative  Neuro  Alert, oriented   Vital Signs Visit Vitals  BP (!) 172/78 (BP 1 Location: Right upper arm, BP Patient Position: At rest)   Pulse 100   Temp 98.6 °F (37 °C)   Resp 18   Ht 5' 8\" (1.727 m)   Wt 76.7 kg (169 lb 3.2 oz)   SpO2 98%   BMI 25.73 kg/m²         Laboratory  Recent Labs     10/18/22  0440 10/17/22  0322 10/16/22  1315   * 101* 924*   AGAP 21* 13 30*   WBC 12.5*  --  14.0*   CREA 3.75* 5.71* 5.23*   AST 20  --  20   ALT 22  --  27         Factors impacting BG management  Factor Dose Comments   Nutrition:  Standard meals     CL        Pain PRN morphine    Other:   Kidney function  Liver function     Dialysis patient   Normal   M,W, F     Blood glucose pattern      Significant diabetes-related events over the past 24-72 hours  Transitioned off glucostabilizer with 3 units Humalog    Assessment and Plan   Nursing Diagnosis Risk for unstable blood glucose pattern   Nursing Intervention Domain 5250 Decision-making Support   Nursing Interventions Examined current inpatient diabetes/blood glucose control   Explored factors facilitating and impeding inpatient management  Explored corrective strategies with patient and responsible inpatient provider   Informed patient of rational for insulin strategy while hospitalized     Nursing Diagnosis 25246 Ineffective Health Management   Nursing Intervention Domain 5250 Decision-makingSupport   Nursing Interventions Identified diabetes self-management practices impeding diabetes control  Discussed diabetes survival skills related to  Healthy Plate eating plan; given handouts  Role of physical activity in improving insulin sensitivity and action  Procedure for blood glucose monitoring & options for low-cost products available from [facility]   Medications plan at discharge     Evaluation   This 46year old patient with Type 1 diabetes has a hx of DKA. The patient started a tandem insulin pump in July 2022. The patient reports issues with the Dex com sensor. The patient reports that the sensor may have come off while he was sleep. The insulin pump should alarm, ti notify the patient that the sensor is not operating. The insulin pump should also switch over to manual and deliver the pre-set basal dose programmed in the insulin pump. The patient has insulin pump education on file, but may require reinforcement. The patient may also benefit for from using adhesive overlays to ensure the sensor will not come off at night. This was discussed with the patient. The patient does not have supplies on hand to resume the insulin pump. I recommend using scheduled  basal/bolus while hospitalized. This patient would benefit from diabetes self-management education and support DAVID [facility]) after discharge. The patient had all supplies and  for Tandem insulin pump brought to him. He was able to restart his insulin pump with the previous settings, set by his endocrinologist. The patient is alert and oriented and proficient to use and operate his own insulin pump. I was notified that the anion gap is back open and the patient will return to [facility]. I recommend that the patient remain on glucostabilizer until the the anion gap is closed x 2. At that time the patient can restart his pump. After starting the patient back on his own insulin pump, the patient should remain on the glucostabilizer for 2 hours before stopping it. Nurses notified and verbalize understanding. Recommendations   Continue on glucostabilizer until anion gap is closed x 2. Billing Code(s)   [] 21  IP subsequent hospital care - 35 minutes [] 62088 Prolonged Services - 65 minutes [] 23232 Prolonged Services - 110 minutes  [] 21128 IP subsequent hospital care - 25 minutes [] 15099 Prolonged Services - 55 minutes [] 45673 Prolonged Services - 100 minutes  [x] 03227 IP subsequent hospital care - 15 minutes [] 36415 Prolonged Services - 45 minutes [] 09421 Prolonged Services - 90 minutes    Before making these care recommendations, I personally reviewed the hospitalization record, including notes, laboratory & diagnostic data and current medications, and examined the patient at the bedside (circumstances permitting) before making care recommendations. More than fifty (50) percent of the time was spent in patient counseling and/or care coordination.   Total minutes: 15 minutes    ANEESH Alva  Diabetes Clinical Nurse Specialist  [facility]  Access via [employer]

## 2022-10-19 ENCOUNTER — APPOINTMENT (OUTPATIENT)
Dept: NON INVASIVE DIAGNOSTICS | Age: 42
DRG: 420 | End: 2022-10-19
Attending: NURSE PRACTITIONER
Payer: MEDICAID

## 2022-10-19 ENCOUNTER — APPOINTMENT (OUTPATIENT)
Dept: NUCLEAR MEDICINE | Age: 42
DRG: 420 | End: 2022-10-19
Attending: NURSE PRACTITIONER
Payer: MEDICAID

## 2022-10-19 LAB
ADMINISTERED INITIALS, ADMINIT: NORMAL
ANION GAP SERPL CALC-SCNC: 13 MMOL/L (ref 5–15)
ANION GAP SERPL CALC-SCNC: 15 MMOL/L (ref 5–15)
ANION GAP SERPL CALC-SCNC: 16 MMOL/L (ref 5–15)
ANION GAP SERPL CALC-SCNC: 5 MMOL/L (ref 5–15)
ANION GAP SERPL CALC-SCNC: 9 MMOL/L (ref 5–15)
BACTERIA SPEC CULT: NORMAL
BASOPHILS # BLD: 0 K/UL (ref 0–0.1)
BASOPHILS NFR BLD: 0 % (ref 0–1)
BUN SERPL-MCNC: 15 MG/DL (ref 6–20)
BUN SERPL-MCNC: 17 MG/DL (ref 6–20)
BUN SERPL-MCNC: 47 MG/DL (ref 6–20)
BUN SERPL-MCNC: 47 MG/DL (ref 6–20)
BUN SERPL-MCNC: 48 MG/DL (ref 6–20)
BUN/CREAT SERPL: 10 (ref 12–20)
BUN/CREAT SERPL: 6 (ref 12–20)
BUN/CREAT SERPL: 6 (ref 12–20)
BUN/CREAT SERPL: 9 (ref 12–20)
BUN/CREAT SERPL: 9 (ref 12–20)
CALCIUM SERPL-MCNC: 7.5 MG/DL (ref 8.5–10.1)
CALCIUM SERPL-MCNC: 7.6 MG/DL (ref 8.5–10.1)
CALCIUM SERPL-MCNC: 7.6 MG/DL (ref 8.5–10.1)
CALCIUM SERPL-MCNC: 7.7 MG/DL (ref 8.5–10.1)
CALCIUM SERPL-MCNC: 7.9 MG/DL (ref 8.5–10.1)
CHLORIDE SERPL-SCNC: 90 MMOL/L (ref 97–108)
CHLORIDE SERPL-SCNC: 91 MMOL/L (ref 97–108)
CHLORIDE SERPL-SCNC: 92 MMOL/L (ref 97–108)
CHLORIDE SERPL-SCNC: 98 MMOL/L (ref 97–108)
CHLORIDE SERPL-SCNC: 98 MMOL/L (ref 97–108)
CO2 SERPL-SCNC: 21 MMOL/L (ref 21–32)
CO2 SERPL-SCNC: 21 MMOL/L (ref 21–32)
CO2 SERPL-SCNC: 24 MMOL/L (ref 21–32)
CO2 SERPL-SCNC: 28 MMOL/L (ref 21–32)
CO2 SERPL-SCNC: 31 MMOL/L (ref 21–32)
CREAT SERPL-MCNC: 2.66 MG/DL (ref 0.7–1.3)
CREAT SERPL-MCNC: 2.87 MG/DL (ref 0.7–1.3)
CREAT SERPL-MCNC: 4.91 MG/DL (ref 0.7–1.3)
CREAT SERPL-MCNC: 5.04 MG/DL (ref 0.7–1.3)
CREAT SERPL-MCNC: 5.2 MG/DL (ref 0.7–1.3)
D50 ADMINISTERED, D50ADM: 0 ML
D50 ORDER, D50ORD: 0 ML
DIFFERENTIAL METHOD BLD: ABNORMAL
EOSINOPHIL # BLD: 0.1 K/UL (ref 0–0.4)
EOSINOPHIL NFR BLD: 2 % (ref 0–7)
ERYTHROCYTE [DISTWIDTH] IN BLOOD BY AUTOMATED COUNT: 18.8 % (ref 11.5–14.5)
GLUCOSE BLD STRIP.AUTO-MCNC: 111 MG/DL (ref 65–117)
GLUCOSE BLD STRIP.AUTO-MCNC: 132 MG/DL (ref 65–117)
GLUCOSE BLD STRIP.AUTO-MCNC: 133 MG/DL (ref 65–117)
GLUCOSE BLD STRIP.AUTO-MCNC: 138 MG/DL (ref 65–117)
GLUCOSE BLD STRIP.AUTO-MCNC: 140 MG/DL (ref 65–117)
GLUCOSE BLD STRIP.AUTO-MCNC: 141 MG/DL (ref 65–117)
GLUCOSE BLD STRIP.AUTO-MCNC: 151 MG/DL (ref 65–117)
GLUCOSE BLD STRIP.AUTO-MCNC: 153 MG/DL (ref 65–117)
GLUCOSE BLD STRIP.AUTO-MCNC: 155 MG/DL (ref 65–117)
GLUCOSE BLD STRIP.AUTO-MCNC: 161 MG/DL (ref 65–117)
GLUCOSE BLD STRIP.AUTO-MCNC: 180 MG/DL (ref 65–117)
GLUCOSE BLD STRIP.AUTO-MCNC: 182 MG/DL (ref 65–117)
GLUCOSE BLD STRIP.AUTO-MCNC: 186 MG/DL (ref 65–117)
GLUCOSE BLD STRIP.AUTO-MCNC: 186 MG/DL (ref 65–117)
GLUCOSE BLD STRIP.AUTO-MCNC: 187 MG/DL (ref 65–117)
GLUCOSE BLD STRIP.AUTO-MCNC: 194 MG/DL (ref 65–117)
GLUCOSE BLD STRIP.AUTO-MCNC: 202 MG/DL (ref 65–117)
GLUCOSE BLD STRIP.AUTO-MCNC: 217 MG/DL (ref 65–117)
GLUCOSE BLD STRIP.AUTO-MCNC: 217 MG/DL (ref 65–117)
GLUCOSE BLD STRIP.AUTO-MCNC: 226 MG/DL (ref 65–117)
GLUCOSE SERPL-MCNC: 135 MG/DL (ref 65–100)
GLUCOSE SERPL-MCNC: 155 MG/DL (ref 65–100)
GLUCOSE SERPL-MCNC: 200 MG/DL (ref 65–100)
GLUCOSE SERPL-MCNC: 204 MG/DL (ref 65–100)
GLUCOSE SERPL-MCNC: 225 MG/DL (ref 65–100)
GLUCOSE, GLC: 111 MG/DL
GLUCOSE, GLC: 132 MG/DL
GLUCOSE, GLC: 133 MG/DL
GLUCOSE, GLC: 140 MG/DL
GLUCOSE, GLC: 141 MG/DL
GLUCOSE, GLC: 151 MG/DL
GLUCOSE, GLC: 153 MG/DL
GLUCOSE, GLC: 155 MG/DL
GLUCOSE, GLC: 161 MG/DL
GLUCOSE, GLC: 180 MG/DL
GLUCOSE, GLC: 182 MG/DL
GLUCOSE, GLC: 186 MG/DL
GLUCOSE, GLC: 187 MG/DL
GLUCOSE, GLC: 194 MG/DL
GLUCOSE, GLC: 202 MG/DL
GLUCOSE, GLC: 217 MG/DL
GLUCOSE, GLC: 226 MG/DL
HCT VFR BLD AUTO: 33.6 % (ref 36.6–50.3)
HGB BLD-MCNC: 10.8 G/DL (ref 12.1–17)
HIGH TARGET, HITG: 200 MG/DL
IMM GRANULOCYTES # BLD AUTO: 0 K/UL (ref 0–0.04)
IMM GRANULOCYTES NFR BLD AUTO: 0 % (ref 0–0.5)
INSULIN ADMINSTERED, INSADM: 0 UNITS/HOUR
INSULIN ADMINSTERED, INSADM: 0.5 UNITS/HOUR
INSULIN ADMINSTERED, INSADM: 0.7 UNITS/HOUR
INSULIN ADMINSTERED, INSADM: 1.2 UNITS/HOUR
INSULIN ADMINSTERED, INSADM: 1.2 UNITS/HOUR
INSULIN ADMINSTERED, INSADM: 1.3 UNITS/HOUR
INSULIN ADMINSTERED, INSADM: 1.4 UNITS/HOUR
INSULIN ADMINSTERED, INSADM: 1.6 UNITS/HOUR
INSULIN ADMINSTERED, INSADM: 3.3 UNITS/HOUR
INSULIN ORDER, INSORD: 0 UNITS/HOUR
INSULIN ORDER, INSORD: 0.5 UNITS/HOUR
INSULIN ORDER, INSORD: 0.7 UNITS/HOUR
INSULIN ORDER, INSORD: 1.2 UNITS/HOUR
INSULIN ORDER, INSORD: 1.2 UNITS/HOUR
INSULIN ORDER, INSORD: 1.3 UNITS/HOUR
INSULIN ORDER, INSORD: 1.4 UNITS/HOUR
INSULIN ORDER, INSORD: 1.6 UNITS/HOUR
INSULIN ORDER, INSORD: 3.3 UNITS/HOUR
LOW TARGET, LOT: 150 MG/DL
LYMPHOCYTES # BLD: 1 K/UL (ref 0.8–3.5)
LYMPHOCYTES NFR BLD: 13 % (ref 12–49)
MAGNESIUM SERPL-MCNC: 2 MG/DL (ref 1.6–2.4)
MAGNESIUM SERPL-MCNC: 2.4 MG/DL (ref 1.6–2.4)
MAGNESIUM SERPL-MCNC: 2.4 MG/DL (ref 1.6–2.4)
MCH RBC QN AUTO: 26.9 PG (ref 26–34)
MCHC RBC AUTO-ENTMCNC: 32.1 G/DL (ref 30–36.5)
MCV RBC AUTO: 83.6 FL (ref 80–99)
MINUTES UNTIL NEXT BG, NBG: 120 MIN
MINUTES UNTIL NEXT BG, NBG: 120 MIN
MINUTES UNTIL NEXT BG, NBG: 60 MIN
MONOCYTES # BLD: 0.7 K/UL (ref 0–1)
MONOCYTES NFR BLD: 9 % (ref 5–13)
MULTIPLIER, MUL: 0
MULTIPLIER, MUL: 0.01
MULTIPLIER, MUL: 0.02
NEUTS SEG # BLD: 5.9 K/UL (ref 1.8–8)
NEUTS SEG NFR BLD: 76 % (ref 32–75)
NRBC # BLD: 0 K/UL (ref 0–0.01)
NRBC BLD-RTO: 0 PER 100 WBC
ORDER INITIALS, ORDINIT: NORMAL
PHOSPHATE SERPL-MCNC: 6.7 MG/DL (ref 2.6–4.7)
PLATELET # BLD AUTO: 257 K/UL (ref 150–400)
PMV BLD AUTO: 9.9 FL (ref 8.9–12.9)
POTASSIUM SERPL-SCNC: 3.2 MMOL/L (ref 3.5–5.1)
POTASSIUM SERPL-SCNC: 3.2 MMOL/L (ref 3.5–5.1)
POTASSIUM SERPL-SCNC: 3.4 MMOL/L (ref 3.5–5.1)
POTASSIUM SERPL-SCNC: 3.5 MMOL/L (ref 3.5–5.1)
POTASSIUM SERPL-SCNC: 3.6 MMOL/L (ref 3.5–5.1)
RBC # BLD AUTO: 4.02 M/UL (ref 4.1–5.7)
SERVICE CMNT-IMP: ABNORMAL
SERVICE CMNT-IMP: NORMAL
SERVICE CMNT-IMP: NORMAL
SODIUM SERPL-SCNC: 127 MMOL/L (ref 136–145)
SODIUM SERPL-SCNC: 127 MMOL/L (ref 136–145)
SODIUM SERPL-SCNC: 129 MMOL/L (ref 136–145)
SODIUM SERPL-SCNC: 134 MMOL/L (ref 136–145)
SODIUM SERPL-SCNC: 135 MMOL/L (ref 136–145)
TROPONIN-HIGH SENSITIVITY: 151 NG/L (ref 0–76)
UR CULT HOLD, URHOLD: NORMAL
WBC # BLD AUTO: 7.7 K/UL (ref 4.1–11.1)

## 2022-10-19 PROCEDURE — A9500 TC99M SESTAMIBI: HCPCS

## 2022-10-19 PROCEDURE — 99231 SBSQ HOSP IP/OBS SF/LOW 25: CPT | Performed by: GENERAL ACUTE CARE HOSPITAL

## 2022-10-19 PROCEDURE — 80048 BASIC METABOLIC PNL TOTAL CA: CPT

## 2022-10-19 PROCEDURE — 51798 US URINE CAPACITY MEASURE: CPT

## 2022-10-19 PROCEDURE — 74011250637 HC RX REV CODE- 250/637: Performed by: INTERNAL MEDICINE

## 2022-10-19 PROCEDURE — 84100 ASSAY OF PHOSPHORUS: CPT

## 2022-10-19 PROCEDURE — 74011250636 HC RX REV CODE- 250/636: Performed by: NURSE PRACTITIONER

## 2022-10-19 PROCEDURE — 74011250636 HC RX REV CODE- 250/636: Performed by: INTERNAL MEDICINE

## 2022-10-19 PROCEDURE — 83735 ASSAY OF MAGNESIUM: CPT

## 2022-10-19 PROCEDURE — 74011250637 HC RX REV CODE- 250/637: Performed by: NURSE PRACTITIONER

## 2022-10-19 PROCEDURE — 74011636637 HC RX REV CODE- 636/637: Performed by: HOSPITALIST

## 2022-10-19 PROCEDURE — 85025 COMPLETE CBC W/AUTO DIFF WBC: CPT

## 2022-10-19 PROCEDURE — 74011000250 HC RX REV CODE- 250: Performed by: INTERNAL MEDICINE

## 2022-10-19 PROCEDURE — 65270000046 HC RM TELEMETRY

## 2022-10-19 PROCEDURE — 36415 COLL VENOUS BLD VENIPUNCTURE: CPT

## 2022-10-19 PROCEDURE — 99231 SBSQ HOSP IP/OBS SF/LOW 25: CPT

## 2022-10-19 PROCEDURE — 78452 HT MUSCLE IMAGE SPECT MULT: CPT

## 2022-10-19 PROCEDURE — 77030040830 HC CATH URETH FOL MDII -A

## 2022-10-19 PROCEDURE — 82962 GLUCOSE BLOOD TEST: CPT

## 2022-10-19 PROCEDURE — C9113 INJ PANTOPRAZOLE SODIUM, VIA: HCPCS | Performed by: INTERNAL MEDICINE

## 2022-10-19 PROCEDURE — 84484 ASSAY OF TROPONIN QUANT: CPT

## 2022-10-19 RX ORDER — CARVEDILOL 12.5 MG/1
25 TABLET ORAL 2 TIMES DAILY WITH MEALS
Status: DISCONTINUED | OUTPATIENT
Start: 2022-10-19 | End: 2022-10-25 | Stop reason: HOSPADM

## 2022-10-19 RX ORDER — POTASSIUM CHLORIDE 750 MG/1
40 TABLET, FILM COATED, EXTENDED RELEASE ORAL ONCE
Status: COMPLETED | OUTPATIENT
Start: 2022-10-19 | End: 2022-10-19

## 2022-10-19 RX ORDER — PROCHLORPERAZINE EDISYLATE 5 MG/ML
10 INJECTION INTRAMUSCULAR; INTRAVENOUS
Status: DISCONTINUED | OUTPATIENT
Start: 2022-10-19 | End: 2022-10-21

## 2022-10-19 RX ADMIN — CARVEDILOL 25 MG: 12.5 TABLET, FILM COATED ORAL at 17:13

## 2022-10-19 RX ADMIN — HEPARIN SODIUM 5000 UNITS: 5000 INJECTION INTRAVENOUS; SUBCUTANEOUS at 05:20

## 2022-10-19 RX ADMIN — ASPIRIN 81 MG CHEWABLE TABLET 81 MG: 81 TABLET CHEWABLE at 09:15

## 2022-10-19 RX ADMIN — ONDANSETRON 4 MG: 2 INJECTION INTRAMUSCULAR; INTRAVENOUS at 01:32

## 2022-10-19 RX ADMIN — TAMSULOSIN HYDROCHLORIDE 0.4 MG: 0.4 CAPSULE ORAL at 09:15

## 2022-10-19 RX ADMIN — DEXTROSE MONOHYDRATE 100 ML/HR: 50 INJECTION, SOLUTION INTRAVENOUS at 14:23

## 2022-10-19 RX ADMIN — HEPARIN SODIUM 5000 UNITS: 5000 INJECTION INTRAVENOUS; SUBCUTANEOUS at 20:00

## 2022-10-19 RX ADMIN — Medication 2 UNITS: at 18:13

## 2022-10-19 RX ADMIN — ONDANSETRON 4 MG: 2 INJECTION INTRAMUSCULAR; INTRAVENOUS at 12:29

## 2022-10-19 RX ADMIN — PROCHLORPERAZINE EDISYLATE 10 MG: 5 INJECTION INTRAMUSCULAR; INTRAVENOUS at 05:19

## 2022-10-19 RX ADMIN — HEPARIN SODIUM 5000 UNITS: 5000 INJECTION INTRAVENOUS; SUBCUTANEOUS at 13:36

## 2022-10-19 RX ADMIN — SODIUM CHLORIDE, PRESERVATIVE FREE 10 ML: 5 INJECTION INTRAVENOUS at 09:23

## 2022-10-19 RX ADMIN — AMLODIPINE BESYLATE 10 MG: 5 TABLET ORAL at 09:14

## 2022-10-19 RX ADMIN — SODIUM CHLORIDE, PRESERVATIVE FREE 10 ML: 5 INJECTION INTRAVENOUS at 13:37

## 2022-10-19 RX ADMIN — MORPHINE SULFATE 2 MG: 2 INJECTION, SOLUTION INTRAMUSCULAR; INTRAVENOUS at 20:00

## 2022-10-19 RX ADMIN — POTASSIUM CHLORIDE 40 MEQ: 750 TABLET, FILM COATED, EXTENDED RELEASE ORAL at 14:29

## 2022-10-19 RX ADMIN — SODIUM CHLORIDE, PRESERVATIVE FREE 10 ML: 5 INJECTION INTRAVENOUS at 05:20

## 2022-10-19 RX ADMIN — HYDRALAZINE HYDROCHLORIDE 20 MG: 20 INJECTION INTRAMUSCULAR; INTRAVENOUS at 03:25

## 2022-10-19 RX ADMIN — MORPHINE SULFATE 2 MG: 2 INJECTION, SOLUTION INTRAMUSCULAR; INTRAVENOUS at 04:44

## 2022-10-19 RX ADMIN — MORPHINE SULFATE 2 MG: 2 INJECTION, SOLUTION INTRAMUSCULAR; INTRAVENOUS at 17:13

## 2022-10-19 RX ADMIN — FINASTERIDE 5 MG: 5 TABLET, FILM COATED ORAL at 09:15

## 2022-10-19 RX ADMIN — DULOXETINE HYDROCHLORIDE 60 MG: 30 CAPSULE, DELAYED RELEASE ORAL at 09:15

## 2022-10-19 RX ADMIN — SODIUM CHLORIDE, PRESERVATIVE FREE 10 ML: 5 INJECTION INTRAVENOUS at 20:00

## 2022-10-19 RX ADMIN — MORPHINE SULFATE 2 MG: 2 INJECTION, SOLUTION INTRAMUSCULAR; INTRAVENOUS at 09:49

## 2022-10-19 RX ADMIN — MORPHINE SULFATE 2 MG: 2 INJECTION, SOLUTION INTRAMUSCULAR; INTRAVENOUS at 13:36

## 2022-10-19 RX ADMIN — CARVEDILOL 12.5 MG: 12.5 TABLET, FILM COATED ORAL at 09:14

## 2022-10-19 RX ADMIN — SODIUM CHLORIDE 40 MG: 9 INJECTION, SOLUTION INTRAMUSCULAR; INTRAVENOUS; SUBCUTANEOUS at 09:14

## 2022-10-19 RX ADMIN — MORPHINE SULFATE 2 MG: 2 INJECTION, SOLUTION INTRAMUSCULAR; INTRAVENOUS at 22:52

## 2022-10-19 RX ADMIN — DEXTROSE MONOHYDRATE 100 ML/HR: 50 INJECTION, SOLUTION INTRAVENOUS at 03:14

## 2022-10-19 RX ADMIN — SODIUM CHLORIDE 0 UNITS/HR: 9 INJECTION, SOLUTION INTRAVENOUS at 21:03

## 2022-10-19 NOTE — CONSULTS
[facility]  [address] [place] [address], [address] [facility]  [phone]  [email or web address]       CARDIOLOGY CONSULTATION    PLEASE NOTE THAT WE CONFIRMED WITH THE REFERRING PHYSICIAN PRIOR TO SEEING THE PATIENT THAT THE PATIENT IS BEING REFERRED FOR [address] EVALUATION AND FOR LONG-TERM ONGOING CARDIAC CARE     Date of  Admission: 10/16/2022 12:39 PM     Admission type:Emergency   Primary Care Jessica Pollock MD     Attending Provider: Loretta Garrido MD  Cardiology Provider: Dr. Cornelius Go: Elevated troponin     Subjective: Priscilla Mcfadden is a 46 y.o. male admitted for DKA, type 1 ([address] [E10.10]. He has a PMHx of DM1, HTN, ESRD on HD MWF, HLD and hx of COVID-19. He was admitted to [address] [place] [address] for DKA. Started on insulin gtt. Had been having increased N/V during admission. For some reason, troponin levels were obtained, trending upwards, peak at 151. Cardiology consulted for further evaluation. Pt was recently seen by us in 8/2022, at that time admitted for DKA with acute hypoxic respiratory failure. Required intubation initally. Had elevated troponin at that time. Echo done with preserved LVEF. Recommended stress test, planned to do this outpatient, but he did not follow up. He tells me he started having chest pain yesterday after throwing up. Pain is improved now. It is mid-sternal without radiation. Has no associated symptoms of SOB or nausea. Says the pain worsens if he's throwing up.       Patient Active Problem List    Diagnosis Date Noted    Respiratory failure ([address] 09/06/2022    Hyperglycemia due to diabetes mellitus ([address] 07/10/2022    Intractable nausea and vomiting 07/10/2022    Chronic kidney disease, stage 3a ([address]     UTI (urinary tract infection) 03/08/2022    Coffee ground emesis 02/09/2022    COVID-19 02/09/2022    DKA (diabetic ketoacidosis) ([address] 12/02/2021    Hyperkalemia 09/23/2021 Hypothermia 2020    Acidosis, metabolic     DKA, type 1, not at goal [facility]) 2020    FELECIA (acute kidney injury) ([address] 2020    Hydronephrosis of right kidney 10/29/2019    DKA (diabetic ketoacidoses) 2019    DKA, type 1 ([address] 2018    Neuropathy 2018    Upper GI bleed 2018    Diabetic neuropathy, type I diabetes mellitus ([address] 2014    Tobacco abuse 2011    HTN (hypertension) 2011      Jeffrey Moss MD  Past Medical History:   Diagnosis Date    Bipolar 1 disorder, depressed ([address]     Bipolar disorder ([address]     Chronic kidney disease, stage 3a ([address]     Depression     Diabetes ([address]     DKA, type 1 ([address] 2013    diagnosed age 23    DKA, type 1 ([address]     H/O noncompliance with medical treatment, presenting hazards to health     MRSA (methicillin resistant staph aureus) culture positive     MRSA (methicillin resistant Staphylococcus aureus)     Face    Noncompliance with medication regimen     Second hand smoke exposure     Seizure ([address]     Seizures ([address] 2006 or 2007    one episode during halfway      Social History     Socioeconomic History    Marital status: SINGLE   Tobacco Use    Smoking status: Former     Packs/day: 0.10     Years: 16.00     Pack years: 1.60     Types: Cigarettes     Start date: 10/4/1999     Quit date: 2020     Years since quittin.6    Smokeless tobacco: Never   Vaping Use    Vaping Use: Never used   Substance and Sexual Activity    Alcohol use: No    Drug use: No    Sexual activity: Not Currently   Social History Narrative    ** Merged History Encounter **          Social Determinants of Health     Financial Resource Strain: High Risk    Difficulty of Paying Living Expenses: Very hard   Food Insecurity: Food Insecurity Present    Worried About Running Out of Food in the Last Year: Often true    Ran Out of Food in the Last Year: Often true     No Known Allergies   Family History   Problem Relation Age of Onset Diabetes Mother     Diabetes Other         neice, type 1       Current Facility-Administered Medications   Medication Dose Route Frequency    prochlorperazine (COMPAZINE) injection 10 mg  10 mg IntraVENous Q6H PRN    insulin lispro (HUMALOG) injection   SubCUTAneous TIDAC    glucose chewable tablet 16 g  4 Tablet Oral PRN    glucagon (GLUCAGEN) injection 1 mg  1 mg IntraMUSCular PRN    dextrose 10% infusion 0-250 mL  0-250 mL IntraVENous PRN    insulin regular (NOVOLIN R, HUMULIN R) 100 Units in 0.9% sodium chloride 100 mL infusion  0-50 Units/hr IntraVENous TITRATE    dextrose 5% infusion  100 mL/hr IntraVENous CONTINUOUS    cefTRIAXone (ROCEPHIN) 1 g in 0.9% sodium chloride (MBP/ADV) 50 mL MBP  1 g IntraVENous Q24H    aspirin chewable tablet 81 mg  81 mg Oral DAILY    lidocaine (XYLOCAINE) 2 % viscous solution 15 mL  15 mL Mouth/Throat PRN    benzocaine-menthoL (CEPACOL) lozenge 1 Lozenge  1 Lozenge Mucous Membrane PRN    insulin lispro (HUMALOG) injection   SubCUTAneous Q4H    glucose chewable tablet 16 g  4 Tablet Oral PRN    glucagon (GLUCAGEN) injection 1 mg  1 mg IntraMUSCular PRN    dextrose 10% infusion 0-250 mL  0-250 mL IntraVENous PRN    morphine injection 2 mg  2 mg IntraVENous Q3H PRN    amLODIPine (NORVASC) tablet 10 mg  10 mg Oral DAILY    carvediloL (COREG) tablet 12.5 mg  12.5 mg Oral BID WITH MEALS    hydrALAZINE (APRESOLINE) 20 mg/mL injection 20 mg  20 mg IntraVENous Q6H PRN    DULoxetine (CYMBALTA) capsule 60 mg  60 mg Oral DAILY    finasteride (PROSCAR) tablet 5 mg  5 mg Oral DAILY    tamsulosin (FLOMAX) capsule 0.4 mg  0.4 mg Oral DAILY    sodium chloride (NS) flush 5-40 mL  5-40 mL IntraVENous Q8H    sodium chloride (NS) flush 5-40 mL  5-40 mL IntraVENous PRN    acetaminophen (TYLENOL) tablet 650 mg  650 mg Oral Q6H PRN    Or    acetaminophen (TYLENOL) suppository 650 mg  650 mg Rectal Q6H PRN    polyethylene glycol (MIRALAX) packet 17 g  17 g Oral DAILY PRN    ondansetron (ZOFRAN ODT) tablet 4 mg  4 mg Oral Q8H PRN    Or    ondansetron (ZOFRAN) injection 4 mg  4 mg IntraVENous Q6H PRN    heparin (porcine) injection 5,000 Units  5,000 Units SubCUTAneous Q8H    pantoprazole (PROTONIX) 40 mg in 0.9% sodium chloride 10 mL injection  40 mg IntraVENous DAILY        Review of Symptoms:   11 systems reviewed, negative other than as stated in the HPI        Objective:      Visit Vitals  BP (!) 157/74 (BP Patient Position: Semi fowlers)   Pulse 84   Temp 98 °F (36.7 °C)   Resp 18   Ht 5' 8\" (1.727 m)   Wt 72.7 kg (160 lb 4.4 oz)   SpO2 95%   BMI 24.37 kg/m²         Physical Exam:  General: Well developed, in no acute distress, cooperative and alert  HEENT: No carotid bruits, PEERL, EOM intact. Heart:  reg rate and rhythm; normal S1/S2; no murmurs  Respiratory: Clear bilaterally x 4, no wheezing or rales  Abdomen:   Soft, non-tender, no distention, no masses. + BS. Extremities:  Normal cap refill, no cyanosis, atraumatic. No edema.   Neuro: A&Ox3, speech clear  Skin: Skin color is normal. Non diaphoretic  Vascular: 2+ pulses symmetric in all extremities    Data Review:   Recent Labs     10/19/22  0315 10/18/22  0440 10/16/22  1315   WBC 7.7 12.5* 14.0*   HGB 10.8* 10.7* 11.4*   HCT 33.6* 34.6* 39.1    288 290     Recent Labs     10/19/22  0646 10/19/22  0315 10/18/22  2230 10/18/22  1508 10/18/22  1050 10/18/22  0440 10/17/22  0322 10/16/22  1315   * 127* 129*   < > 130* 131*   < > 130*   K 3.5 3.4* 3.5   < > 3.6 4.0   < > 5.5*   CL 90* 91* 92*   < > 92* 92*   < > 88*   CO2 21 21 25   < > 18* 18*   < > 12*   * 204* 155*   < > 255* 254*   < > 924*   BUN 47* 47* 46*   < > 44* 37*   < > 60*   CREA 5.04* 4.91* 4.73*   < > 4.26* 3.75*   < > 5.23*   CA 7.9* 7.6* 7.6*   < > 7.8* 8.2*   < > 8.9   MG 2.4 2.4 2.4   < > 2.3  --   --  3.1*   PHOS  --  6.7*  --   --  6.4*  --   --  8.6*   ALB  --   --   --   --   --  2.6*  --  3.1*   TBILI  --   --   --   --   --  0.5  --  0.5   ALT  --   --   -- --   --  22  --  27    < > = values in this interval not displayed. No results for input(s): TROIQ, CPK, CKMB in the last 72 hours. Intake/Output Summary (Last 24 hours) at 10/19/2022 1041  Last data filed at 10/19/2022 0943  Gross per 24 hour   Intake 400 ml   Output 975 ml   Net -575 ml        Cardiographics    Telemetry: sinus rhythm    ECG: sinus tachycardia; incomplete RBBB, vr 105 bpm    Radiology Results in the past 24 hours:  No results found. Assessment:       Active Problems:    DKA, type 1 ([facility] (11/18/2018)         Plan:     Elevated troponin  Trops 17 --> 137 --> 133 --> 151  Likely in the setting of DKA and recurrent N/V, similar to previous admission in 8/2022  EKG without acute ischemia  CP is atypical  Echo done last admission 8/2022 with normal LVEF 55-60%  Will proceed with lexiscan stress test    2. DKA  Care per primary team    3. HTN  Increase carvedilol 25 mg BID    Thank you for this consultation. We will continue to follow with you. CHARLY Feliz MD    Patient seen, examined by me personally. Plan discussed as detailed. Agree with note as outlined by NP with modifications as noted. Further recommendations based upon stress test results.      Melissa Roger MD

## 2022-10-19 NOTE — PROGRESS NOTES
Problem: Falls - Risk of  Goal: *Absence of Falls  Description: Document Beryl Counts Fall Risk and appropriate interventions in the flowsheet. Outcome: Progressing Towards Goal  Note: Fall Risk Interventions:  Mobility Interventions: Bed/chair exit alarm, Communicate number of staff needed for ambulation/transfer, Patient to call before getting OOB, Strengthening exercises (ROM-active/passive)         Medication Interventions: Bed/chair exit alarm, Evaluate medications/consider consulting pharmacy, Patient to call before getting OOB, Teach patient to arise slowly                  Problem: Infection - Risk of, Central Venous Catheter-Associated Bloodstream Infection  Goal: *Absence of infection signs and symptoms  Outcome: Progressing Towards Goal     Problem: Pressure Injury - Risk of  Goal: *Prevention of pressure injury  Description: Document Corey Scale and appropriate interventions in the flowsheet.   Outcome: Progressing Towards Goal  Note: Pressure Injury Interventions:  Sensory Interventions: Assess changes in LOC, Float heels, Keep linens dry and wrinkle-free, Maintain/enhance activity level, Minimize linen layers         Activity Interventions: Assess need for specialty bed, Increase time out of bed    Mobility Interventions: Assess need for specialty bed, Float heels, HOB 30 degrees or less    Nutrition Interventions: Document food/fluid/supplement intake, Offer support with meals,snacks and hydration    Friction and Shear Interventions: Lift sheet, Minimize layers

## 2022-10-19 NOTE — DIABETES MGMT
[address] [place] [address]    CLINICAL NURSE SPECIALIST CONSULT     Initial Presentation   Srinivasan Starkey is a 46 y.o. male admitted 10/16/2022 after experiencing nausea and elevated blood sugars. Hx of Type 1 diabetes. Hx of DKA. LAB: Glucose 924. Creatine 5.23. GFR 13. CXR:XR CHEST PORT:   Patient Communication     Add Comments   Not seen     Study Result    Narrative & Impression   EXAM:  XR CHEST PORT     INDICATION: Cough     COMPARISON: 9/27/2022     TECHNIQUE: Upright portable chest AP view     FINDINGS: A double lumen right IJ line is again shown terminating at the  cavoatrial junction. The cardiomediastinal and hilar contours are within normal  limits. Lungs and pleural margins are clear. The visualized bones and upper abdomen are  age-appropriate. IMPRESSION     No acute findings.    EKG, 12 LEAD, INITIAL  Order: [id number]  Status: Final result    Visible to patient: Yes (not seen)    Next appt: 10/25/2022 at 01:00 PM in Diabetes (Gian Murphy, LUCY)    0 Result Notes  Component Ref Range & Units 1 d ago 3 wk ago   Ventricular Rate   89    Atrial Rate   89    P-R Interval ms 184  176    QRS Duration ms 98  106    Q-T Interval ms 384  402    QTC Calculation (Bezet) ms 507  489    Calculated P Axis degrees 77  56    Calculated R Axis degrees 105  48    Calculated T Axis degrees 68  49    Diagnosis  Sinus tachycardia   Rightward axis   Incomplete right bundle branch block   When compared with ECG of 26-SEP-2022 08:37,   QRS axis shifted right   ST elevation now present in Anterior leads   Confirmed by Dmitri Barnard ([id number]) on 10/17/2022 11:32:38 AM  Normal sinus rhythm   Prolonged QT   When compared with ECG of 06-SEP-2022 11:35,   No significant change was found   Confirmed by Sarina [place] ([id number]) on 9/28/2022 6:00:13 PM           HX:   Past Medical History:   Diagnosis Date    Bipolar 1 disorder, depressed ([address]     Bipolar disorder ([address]     Chronic kidney disease, stage 3a ([facility]     Depression     Diabetes ([facility]     DKA, type 1 ([facility] 1/27/2013    diagnosed age 23    DKA, type 1 ([facility]     H/O noncompliance with medical treatment, presenting hazards to health     MRSA (methicillin resistant staph aureus) culture positive     MRSA (methicillin resistant Staphylococcus aureus)     Face    Noncompliance with medication regimen     Second hand smoke exposure     Seizure ([facility]     Seizures ([facility] 2006 or 2007    one episode during detention        INITIAL DX:   DKA, type 1 ([facility] [E10.10]     Current Treatment     TX: Cymbalta. Proscar. Heparin. Insulin. Protonix. Flomax. Consulted by Provider for advanced diabetes nursing assessment and care for:   [x] Transitioning off [place]   [x] Inpatient management strategy  [] Home management assessment  [] Survival skill education    Hospital Course   Clinical progress has been uncomplicated. Diabetes History   The patient reports a 20 year history of Type 1 diabetes. He has a positive family hx of diabetes (mom & niece). He states today that he sees Dr. Shweta Howard (endocrinologist) for his diabetes. He was recently started on a Tandem insulin pump on 07/15/22. Unsure of which CGM is in use. Diabetes-related Medical History  Acute complications  DKA  Neurological complications  Peripheral neuropathy  Microvascular disease  Retinopathy    Diabetes Medication History  Key Antihyperglycemic Medications               insulin aspart U-100 (NovoLOG U-100 Insulin aspart) 100 unit/mL injection Use as instructed via insulin pump.  Dx code E10.65 max daily dose 50U             Diabetes self-management practices:   Eating pattern Deferred       Physical activity pattern   [x] Not employing a physical activity program to control BG    Monitoring pattern   [x] Testing BGs sufficiently to inform self-management adjustments                ( Using Dexcom G6)  Taking medications pattern  [x] Consistent administration  [x] Affordable  Social determinants of health impacting diabetes self-management practices   Concerned that you need to know more about how to stay healthy with diabetes  Overall evaluation:    [x] Unknown if achieving A1c target with drug therapy & self-care practices    Subjective   \" I have my supplies in that bag     Objective   Physical exam  General Normal weight male in no acute distress. Conversant and cooperative  Neuro  Alert, oriented   Vital Signs Visit Vitals  BP (!) 157/74   Pulse 83   Temp 97.7 °F (36.5 °C)   Resp 16   Ht 5' 8\" (1.727 m)   Wt 72.7 kg (160 lb 4.4 oz)   SpO2 91%   BMI 24.37 kg/m²         Laboratory  Recent Labs     10/19/22  1103 10/19/22  0646 10/19/22  0315 10/18/22  1050 10/18/22  0440   * 200* 204*   < > 254*   AGAP 13 16* 15   < > 21*   WBC  --   --  7.7  --  12.5*   CREA 5.20* 5.04* 4.91*   < > 3.75*   AST  --   --   --   --  20   ALT  --   --   --   --  22    < > = values in this interval not displayed.          Factors impacting BG management  Factor Dose Comments   Nutrition:  Standard meals     CL        Pain PRN morphine    Other:   Kidney function  Liver function     Dialysis patient   Normal   M,W, F     Blood glucose pattern      Significant diabetes-related events over the past 24-72 hours  Transitioned off glucostabilizer with 3 units Humalog    Assessment and Plan   Nursing Diagnosis Risk for unstable blood glucose pattern   Nursing Intervention Domain 5250 Decision-making Support   Nursing Interventions Examined current inpatient diabetes/blood glucose control   Explored factors facilitating and impeding inpatient management  Explored corrective strategies with patient and responsible inpatient provider   Informed patient of rational for insulin strategy while hospitalized     Nursing Diagnosis 76626 Ineffective Health Management   Nursing Intervention Domain 5250 Decision-makingSupport   Nursing Interventions Identified diabetes self-management practices impeding diabetes control  Discussed diabetes survival skills related to  Healthy Plate eating plan; given handouts  Role of physical activity in improving insulin sensitivity and action  Procedure for blood glucose monitoring & options for low-cost products available from [facility]   Medications plan at discharge     Evaluation   This 46year old patient with Type 1 diabetes has a hx of DKA. The patient started a tandem insulin pump in July 2022. The patient reports issues with the Dex com sensor. The patient reports that the sensor may have come off while he was sleep. The insulin pump should alarm, ti notify the patient that the sensor is not operating. The insulin pump should also switch over to manual and deliver the pre-set basal dose programmed in the insulin pump. The patient has insulin pump education on file, but may require reinforcement. The patient may also benefit for from using adhesive overlays to ensure the sensor will not come off at night. This was discussed with the patient. The patient does not have supplies on hand to resume the insulin pump. I recommend using scheduled  basal/bolus while hospitalized. This patient would benefit from diabetes self-management education and support DAVID RAMIRE[facility]) after discharge. BG's are elevated this morning. The patient has not received basal insulin since transitioning off glucostabilizer. The patient had all supplies and  for Tandem insulin pump brought to him. He was able to restart his insulin pump with the previous settings, set by his endocrinologist. The patient is alert and oriented and proficient to use and operate his own insulin pump. I was notified that the anion gap is back open and the patient will return to [facility]. I recommend that the patient remain on glucostabilizer until the the anion gap is closed x 2. At that time the patient can restart his pump.  After starting the patient back on his own insulin pump, the patient should remain on the glucostabilizer for 2 hours before stopping it. Nurses notified and verbalize understanding. The patient remains on glucostabilizer. His anion gap is not closed x 2. Once the gap is closed x 2 the patient should resume Tandem insulin pump. After starting the patient back on his own insulin pump, the patient should remain on the glucostabilizer for 2 hours before stopping it. Recommendations   Continue on glucostabilizer until anion gap is closed x 2. Billing Code(s)   [] [id number] IP subsequent hospital care - 35 minutes [] [id number] Prolonged Services - 65 minutes [] [id number] Prolonged Services - 110 minutes  [] [id number] IP subsequent hospital care - 25 minutes [] [id number] Prolonged Services - 55 minutes [] [id number] Prolonged Services - 100 minutes  [x] [id number] IP subsequent hospital care - 15 minutes [] [id number] Prolonged Services - 45 minutes [] [id number] Prolonged Services - 90 minutes    Before making these care recommendations, I personally reviewed the hospitalization record, including notes, laboratory & diagnostic data and current medications, and examined the patient at the bedside (circumstances permitting) before making care recommendations. More than fifty (50) percent of the time was spent in patient counseling and/or care coordination.   Total minutes: 15 minutes    ANEESH Ashby  Diabetes Clinical Nurse Specialist  [facility]  Access via [employer]

## 2022-10-19 NOTE — PROCEDURES
Hemodialysis / [phone]    Vitals Pre Post Assessment Pre Post   BP BP: (!) 175/84 (10/19/22 1200)   162/81 LOC A&OX4 A&OX4   HR Pulse (Heart Rate): 82 (10/19/22 1214) 92 Lungs CLEAR CLEAR   Resp Resp Rate: 16 (10/19/22 1214) 16 Cardiac WNL WNL   Temp Temp: 97.7 °F (36.5 °C) (10/19/22 1214) 97.9 Skin WARM AND DRY WARM AND DRY   Weight  N/A N/A Edema NONE NONE   Tele status BEDSIDE  BEDSIDE Pain Pain Intensity 1: 0 (10/19/22 1050) DENIES PAIN     Orders   Duration: Start: 9701 End: 3942 Total: 3.5HR   Dialyzer: Dialyzer/Set Up Inspection: Revaclear (10/17/22 1153)   K Bath: Dialysate K (mEq/L): 4 (10/17/22 1153)   Ca Bath: Dialysate CA (mEq/L): 2.5 (10/17/22 1153)   Na: Dialysate NA (mEq/L): 139 (10/17/22 1153)   Bicarb: Dialysate HCO3 (mEq/L): 38 (10/17/22 1153)   Target Fluid Removal: Goal/Amount of Fluid to Remove (mL): 0 mL (10/17/22 1153)     Access   Type & Location: R PERMACATH   Comments:    PC : Pre- Assessment: Dressing CDI. No s/s of infection. Both lumens aspirate & flush well. Running well at . Post assessment: Dressing CDI. No changes.                                      Labs   HBsAg (Antigen) / date:  NEG   10/17/2022                                  HBsAb (Antibody) / date:  [facility] 10/17/2022   Source: EPIC   Obtained/Reviewed  Critical Results Called HGB   Date Value Ref Range Status   10/19/2022 10.8 (L) 12.1 - 17.0 g/dL Final     Potassium   Date Value Ref Range Status   10/19/2022 3.2 (L) 3.5 - 5.1 mmol/L Final     Calcium   Date Value Ref Range Status   10/19/2022 7.5 (L) 8.5 - 10.1 MG/DL Final     BUN   Date Value Ref Range Status   10/19/2022 48 (H) 6 - 20 MG/DL Final     Creatinine   Date Value Ref Range Status   10/19/2022 5.20 (H) 0.70 - 1.30 MG/DL Final        Meds Given   Name Dose Route   NONE                 Adequacy / Fluid    Total Liters Process: 75.2   Net Fluid Removed: 0      Comments   Time Out Done:   (Time) 1215   Admitting Diagnosis: DKA   Consent obtained/signed: Informed Consent Verified: Yes (10/17/22 1153)   Machine / RO # Machine Number: [id number] (10/17/22 1153)   Primary Nurse Rpt Pre: Hakan Bolanos RN   Primary Nurse Rpt Post: Hakan Bolanos RN   Pt Education: HD TREATMENT PROCESS   Care Plan: CONTINUE HD AS ORDERED   Pts outpatient clinic: [facility]   Comments:   SBAR received from Primary RN. Pt arrived to HD suite A&Ox[address]. Consent signed & on file. 1218: Each catheter limb disinfected per p&p, caps removed, hubs disinfected per p&p. Each lumen aspirated for blood return and flushed with Normal Saline per policy. Labs drawn per request/ order. VSS. Dialysis Tx initiated. 7344: Tx ended. VSS. Each dialysis catheter limb disinfected per p&p, all possible blood returned per p&p, and each dialysis hub disinfected per p&p. Each lumen flushed, post dialysis catheter Heparin dwell instilled per order, and caps applied. Bed locked and in the lowest position, call bell and belongings in reach. SBAR given to Primary, RN. Patient is stable at time of my departure. All Dialysis related medications have been reviewed.

## 2022-10-19 NOTE — PROGRESS NOTES
Pt was going to go to Stress test but then HD showed up and said that it was priority so stress test will be in AM.

## 2022-10-19 NOTE — PROGRESS NOTES
Received notification from bedside RN about patient with regards to: persistent nausea, not yet due for PRN Zofran and requesting medication for relief  VS: /72, HR 89, RR 18, O2 sat 96% on RA    Intervention given: Compazine IV PRN ordered

## 2022-10-19 NOTE — CONSULTS
Consult    Patient: Yajaira Taylor MRN: [id number]  SSN: [id number]    YOB: 1980  Age: 46 y.o. Sex: male      Subjective: Yajaira Taylor is a 46 y.o. male who is being seen for DKA admitted on 10/16/2022. Mr. Negro Delgado is a patient of Dr. Laila Marie who saw him in June 2022. Since that time he has been in the hospital for DKA four times and respiratory failure and worsening renal function three times. After his hospitalization on 9/6/22 he was started on HD on M/W/F. Pt reports similar to his last presentation for DKA that he fell asleep last night and his CGM fell off. When he woke up his BG was \"very high\" and he came to the ED. This time he says his insulin pump was functioning normally with no alarms or insulin/leakage/smell or other issue. He continues to complain of nausea and low appetite. His BG was 924 and his AG was 30, K 5.5, Na 130 ( autumn 143) . He was started on an insulin drip. Pt insists that his insulin pump has been working normally with no errors or problems. He changes his infusion sets every 4 days. He notes his BGs run in the 250 or less, but has not had issues of hypoglycemia. Pt insists he has been using his insulin pump and has been bolusing for his meals. When asked specifically about carb counting says he uses labels but does not know how to check for carb count without label. When he was admitted his insulin pump was suspended, but not taken off. Pt still has the insulin pump attached. On admission he was started on an insulin drip and once his BG dropped to 130 the insulin drip was stopped for past 2 hours as patient was not eating any food/liquid diet for lunch, he feels nauseas, had oatmeal bowl for breakfast. He is also currently undergoing HD at bedside. Pt denies issus of CP, SOB, vomiting, HA, abdominal pains.     Past Medical History:   Diagnosis Date    Bipolar 1 disorder, depressed ([facility]     Bipolar disorder ([facility]     Chronic kidney disease, stage 3a ([facility]     Depression     Diabetes ([facility]     DKA, type 1 ([facility] 2013    diagnosed age 23    DKA, type 1 ([facility]     H/O noncompliance with medical treatment, presenting hazards to health     MRSA (methicillin resistant staph aureus) culture positive     MRSA (methicillin resistant Staphylococcus aureus)     Face    Noncompliance with medication regimen     Second hand smoke exposure     Seizure ([facility]     Seizures ([facility]  or     one episode during nursing home     Past Surgical History:   Procedure Laterality Date    HX HEENT      top left wisdom tooth    HX ORTHOPAEDIC Left     wrist; MCV    IR INSERT NON TUNL CVC OVER 5 YRS  2022    IR INSERT TUNL CVC W/O PORT OVER 5 YR  2022    UPPER GI ENDOSCOPY,BIOPSY  2018           Family History   Problem Relation Age of Onset    Diabetes Mother     Diabetes Other         neice, type 1      Social History     Tobacco Use    Smoking status: Former     Packs/day: 0.10     Years: 16.00     Pack years: 1.60     Types: Cigarettes     Start date: 10/4/1999     Quit date: 2020     Years since quittin.6    Smokeless tobacco: Never   Substance Use Topics    Alcohol use: No      Current Facility-Administered Medications   Medication Dose Route Frequency Provider Last Rate Last Admin    prochlorperazine (COMPAZINE) injection 10 mg  10 mg IntraVENous Q6H PRN Juliette Amin NP   10 mg at 10/19/22 0519    carvediloL (COREG) tablet 25 mg  25 mg Oral BID WITH MEALS Stewatr Augustine NP   25 mg at 10/19/22 1713    insulin lispro (HUMALOG) injection   SubCUTAneous TIDANestor Calvin MD        glucose chewable tablet 16 g  4 Tablet Oral PRN Ruthie Lombardi MD        glucagon (GLUCAGEN) injection 1 mg  1 mg IntraMUSCular PRN Ruthie Lombardi MD        dextrose 10% infusion 0-250 mL  0-250 mL IntraVENous PRN Ruthie Lombardi MD        insulin regular (NOVOLIN R, HUMULIN R) 100 Units in 0.9% sodium chloride 100 mL infusion  0-50 Units/hr IntraVENous TITRATE Jasen Coffey MD 1.6 mL/hr at 10/19/22 1702 1.6 Units/hr at 10/19/22 1702    dextrose 5% infusion  100 mL/hr IntraVENous CONTINUOUS Jasen Coffey  mL/hr at 10/19/22 1423 100 mL/hr at 10/19/22 1423    aspirin chewable tablet 81 mg  81 mg Oral DAILY Jasen Coffey MD   81 mg at 10/19/22 0915    lidocaine (XYLOCAINE) 2 % viscous solution 15 mL  15 mL Mouth/Throat PRN Din, Rayshawn Ellison MD        benzocaine-menthoL (CEPACOL) lozenge 1 Lozenge  1 Lozenge Mucous Membrane PRN Din, Rayshawn Ellison MD        insulin lispro (HUMALOG) injection   SubCUTAneous Q4H Chiki Matthews MD   3 Units at 10/18/22 0929    glucose chewable tablet 16 g  4 Tablet Oral PRN Din, Rayshawn Ellison MD        glucagon (GLUCAGEN) injection 1 mg  1 mg IntraMUSCular PRN Din, Ji ZARATE MD        dextrose 10% infusion 0-250 mL  0-250 mL IntraVENous PRN Din, Rayshawn Ellison MD        morphine injection 2 mg  2 mg IntraVENous Q3H PRN Jasen Coffey MD   2 mg at 10/19/22 1713    amLODIPine (NORVASC) tablet 10 mg  10 mg Oral DAILY Jasen Coffey MD   10 mg at 10/19/22 0914    hydrALAZINE (APRESOLINE) 20 mg/mL injection 20 mg  20 mg IntraVENous Q6H PRN Jasen Coffey MD   20 mg at 10/19/22 0325    DULoxetine (CYMBALTA) capsule 60 mg  60 mg Oral DAILY Sana Paulino MD   60 mg at 10/19/22 0915    finasteride (PROSCAR) tablet 5 mg  5 mg Oral DAILY Sana Paulino MD   5 mg at 10/19/22 0915    tamsulosin (FLOMAX) capsule 0.4 mg  0.4 mg Oral DAILY Sana Paulino MD   0.4 mg at 10/19/22 0915    sodium chloride (NS) flush 5-40 mL  5-40 mL IntraVENous Q8H Sana Paulino MD   10 mL at 10/19/22 1337    sodium chloride (NS) flush 5-40 mL  5-40 mL IntraVENous PRN Sana Paulino MD   10 mL at 10/19/22 0923    acetaminophen (TYLENOL) tablet 650 mg  650 mg Oral Q6H PRN Sana Paulino MD   650 mg at 10/16/22 2101    Or    acetaminophen (TYLENOL) suppository 650 mg  650 mg Rectal Q6H PRN Sana Paulino MD polyethylene glycol (MIRALAX) packet 17 g  17 g Oral DAILY PRN Alton Mcqueen MD        ondansetron (ZOFRAN ODT) tablet 4 mg  4 mg Oral Q8H PRN Alton Mcqueen MD        Or    ondansetron [facility]) injection 4 mg  4 mg IntraVENous Q6H PRN Alton Mcqueen MD   4 mg at 10/19/22 1229    heparin (porcine) injection 5,000 Units  5,000 Units SubCUTAneous Q8H Alton Mcqueen MD   5,000 Units at 10/19/22 1336    pantoprazole (PROTONIX) 40 mg in 0.9% sodium chloride 10 mL injection  40 mg IntraVENous DAILY Alton Mcqueen MD   40 mg at 10/19/22 0914        No Known Allergies    Review of Systems:  A comprehensive review of systems was negative. Objective:     Vitals:    10/19/22 1515 10/19/22 1530 10/19/22 1541 10/19/22 1545   BP: (!) 161/68 138/60  139/65   Pulse: 82 83  89   Resp:  16     Temp:  97.9 °F (36.6 °C) 97.9 °F (36.6 °C)    TempSrc:       SpO2:  93% 93%    Weight:       Height:            Physical Exam:  GENERAL: alert, cooperative, no distress, appears stated age  EYE: negative  LYMPHATIC: Cervical, supraclavicular, and axillary nodes normal.   THROAT & NECK: normal and no erythema or exudates noted. LUNG: clear to ausc bilateral  HEART: regular rate and rhythm, S1, S2 normal, no murmur, click, rub or gallop  ABDOMEN: soft, non-tender.  Bowel sounds normal. No masses,  no organomegaly  EXTREMITIES:  extremities normal, atraumatic, no cyanosis or edema  SKIN: Normal.  NEUROLOGIC: negative    Recent Results (from the past 24 hour(s))   GLUCOSE, POC    Collection Time: 10/18/22  6:18 PM   Result Value Ref Range    Glucose (POC) 207 (H) 65 - 117 mg/dL    Performed by Osmar Segovia RN    GLUCOSTABILIZER    Collection Time: 10/18/22  6:21 PM   Result Value Ref Range    Glucose 207 mg/dL    Insulin order 1.5 units/hour    Insulin adminstered 1.5 units/hour    Multiplier 0.010     Low target 150 mg/dL    High target 200 mg/dL    D50 order 0.0 ml    D50 administered 0.00 ml    Minutes until next BG 60 min Order initials TM     Administered initials TM    METABOLIC PANEL, BASIC    Collection Time: 10/18/22  6:27 PM   Result Value Ref Range    Sodium 130 (L) 136 - 145 mmol/L    Potassium 4.2 3.5 - 5.1 mmol/L    Chloride 92 (L) 97 - 108 mmol/L    CO2 21 21 - 32 mmol/L    Anion gap 17 (H) 5 - 15 mmol/L    Glucose 213 (H) 65 - 100 mg/dL    BUN 46 (H) 6 - 20 MG/DL    Creatinine 4.51 (H) 0.70 - 1.30 MG/DL    BUN/Creatinine ratio 10 (L) 12 - 20      eGFR 16 (L) >60 ml/min/1.73m2    Calcium 7.4 (L) 8.5 - 10.1 MG/DL   MAGNESIUM    Collection Time: 10/18/22  6:27 PM   Result Value Ref Range    Magnesium 2.4 1.6 - 2.4 mg/dL   GLUCOSE, POC    Collection Time: 10/18/22  7:17 PM   Result Value Ref Range    Glucose (POC) 223 (H) 65 - 117 mg/dL    Performed by Pepe Stein RN    GLUCOSTABILIZER    Collection Time: 10/18/22  7:18 PM   Result Value Ref Range    Glucose 223 mg/dL    Insulin order 3.3 units/hour    Insulin adminstered 3.3 units/hour    Multiplier 0.020     Low target 150 mg/dL    High target 200 mg/dL    D50 order 0.0 ml    D50 administered 0.00 ml    Minutes until next BG 60 min    Order initials TM     Administered initials TM    GLUCOSE, POC    Collection Time: 10/18/22  8:11 PM   Result Value Ref Range    Glucose (POC) 195 (H) 65 - 117 mg/dL    Performed by Vicky Auguste RN    GLUCOSTABILIZER    Collection Time: 10/18/22  8:24 PM   Result Value Ref Range    Glucose 195 mg/dL    Insulin order 2.7 units/hour    Insulin adminstered 2.7 units/hour    Multiplier 0.020     Low target 150 mg/dL    High target 200 mg/dL    D50 order 0.0 ml    D50 administered 0.00 ml    Minutes until next BG 60 min    Order initials KL     Administered initials KL    GLUCOSE, POC    Collection Time: 10/18/22  9:11 PM   Result Value Ref Range    Glucose (POC) 177 (H) 65 - 117 mg/dL    Performed by Shelby Cantrell RN    GLUCOSTABILIZER    Collection Time: 10/18/22  9:26 PM   Result Value Ref Range    Glucose 177 mg/dL Insulin order 2.3 units/hour    Insulin adminstered 2.3 units/hour    Multiplier 0.020     Low target 150 mg/dL    High target 200 mg/dL    D50 order 0.0 ml    D50 administered 0.00 ml    Minutes until next BG 60 min    Order initials HORTENCIA     Administered initials HORTENCIA    GLUCOSE, POC    Collection Time: 10/18/22 10:17 PM   Result Value Ref Range    Glucose (POC) 145 (H) 65 - 117 mg/dL    Performed by Dickson Del Toro RN    GLUCOSTABILIZER    Collection Time: 10/18/22 10:24 PM   Result Value Ref Range    Glucose 145 mg/dL    Insulin order 0.9 units/hour    Insulin adminstered 0.9 units/hour    Multiplier 0.010     Low target 150 mg/dL    High target 200 mg/dL    D50 order 0.0 ml    D50 administered 0.00 ml    Minutes until next BG 60 min    Order initials HORTENCIA     Administered initials HORTENCIA    METABOLIC PANEL, BASIC    Collection Time: 10/18/22 10:30 PM   Result Value Ref Range    Sodium 129 (L) 136 - 145 mmol/L    Potassium 3.5 3.5 - 5.1 mmol/L    Chloride 92 (L) 97 - 108 mmol/L    CO2 25 21 - 32 mmol/L    Anion gap 12 5 - 15 mmol/L    Glucose 155 (H) 65 - 100 mg/dL    BUN 46 (H) 6 - 20 MG/DL    Creatinine 4.73 (H) 0.70 - 1.30 MG/DL    BUN/Creatinine ratio 10 (L) 12 - 20      eGFR 15 (L) >60 ml/min/1.73m2    Calcium 7.6 (L) 8.5 - 10.1 MG/DL   MAGNESIUM    Collection Time: 10/18/22 10:30 PM   Result Value Ref Range    Magnesium 2.4 1.6 - 2.4 mg/dL   GLUCOSE, POC    Collection Time: 10/18/22 11:32 PM   Result Value Ref Range    Glucose (POC) 134 (H) 65 - 117 mg/dL    Performed by Carmen Kumar RN    GLUCOSTABILIZER    Collection Time: 10/18/22 11:33 PM   Result Value Ref Range    Glucose 134 mg/dL    Insulin order 0.0 units/hour    Insulin adminstered 0.0 units/hour    Multiplier 0.000     Low target 150 mg/dL    High target 200 mg/dL    D50 order 0.0 ml    D50 administered 0.00 ml    Minutes until next BG 60 min    Order initials HORTENCIA     Administered initials HORTENCIA    GLUCOSE, POC    Collection Time: 10/19/22 12:45 AM Result Value Ref Range    Glucose (POC) 155 (H) 65 - 117 mg/dL    Performed by Madelyn Amaya RN    GLUCOSTABILIZER    Collection Time: 10/19/22 12:46 AM   Result Value Ref Range    Glucose 155 mg/dL    Insulin order 0.5 units/hour    Insulin adminstered 0.5 units/hour    Multiplier 0.000     Low target 150 mg/dL    High target 200 mg/dL    D50 order 0.0 ml    D50 administered 0.00 ml    Minutes until next BG 60 min    Order initials KL     Administered initials HORTENCIA    GLUCOSE, POC    Collection Time: 10/19/22  2:00 AM   Result Value Ref Range    Glucose (POC) 202 (H) 65 - 117 mg/dL    Performed by Madelyn Amaya RN    GLUCOSTABILIZER    Collection Time: 10/19/22  2:01 AM   Result Value Ref Range    Glucose 202 mg/dL    Insulin order 1.4 units/hour    Insulin adminstered 1.4 units/hour    Multiplier 0.010     Low target 150 mg/dL    High target 200 mg/dL    D50 order 0.0 ml    D50 administered 0.00 ml    Minutes until next BG 60 min    Order initials HORTENCIA     Administered initials HORTENCIA    URINE CULTURE HOLD SAMPLE    Collection Time: 10/19/22  2:05 AM    Specimen: Serum; Urine   Result Value Ref Range    Urine culture hold        Urine on hold in Microbiology dept for 2 days. If unpreserved urine is submitted, it cannot be used for addtional testing after 24 hours, recollection will be required.    GLUCOSE, POC    Collection Time: 10/19/22  3:12 AM   Result Value Ref Range    Glucose (POC) 187 (H) 65 - 117 mg/dL    Performed by Madelyn Amaya RN    GLUCOSTABILIZER    Collection Time: 10/19/22  3:13 AM   Result Value Ref Range    Glucose 187 mg/dL    Insulin order 1.3 units/hour    Insulin adminstered 1.3 units/hour    Multiplier 0.010     Low target 150 mg/dL    High target 200 mg/dL    D50 order 0.0 ml    D50 administered 0.00 ml    Minutes until next BG 60 min    Order initials HORTENCIA     Administered initials HORTENCIA    CBC WITH AUTOMATED DIFF    Collection Time: 10/19/22  3:15 AM   Result Value Ref Range    WBC 7.7 4.1 - 11.1 K/uL    RBC 4.02 (L) 4.10 - 5.70 M/uL    HGB 10.8 (L) 12.1 - 17.0 g/dL    HCT 33.6 (L) 36.6 - 50.3 %    MCV 83.6 80.0 - 99.0 FL    MCH 26.9 26.0 - 34.0 PG    MCHC 32.1 30.0 - 36.5 g/dL    RDW 18.8 (H) 11.5 - 14.5 %    PLATELET 551 297 - 922 K/uL    MPV 9.9 8.9 - 12.9 FL    NRBC 0.0 0  WBC    ABSOLUTE NRBC 0.00 0.00 - 0.01 K/uL    NEUTROPHILS 76 (H) 32 - 75 %    LYMPHOCYTES 13 12 - 49 %    MONOCYTES 9 5 - 13 %    EOSINOPHILS 2 0 - 7 %    BASOPHILS 0 0 - 1 %    IMMATURE GRANULOCYTES 0 0.0 - 0.5 %    ABS. NEUTROPHILS 5.9 1.8 - 8.0 K/UL    ABS. LYMPHOCYTES 1.0 0.8 - 3.5 K/UL    ABS. MONOCYTES 0.7 0.0 - 1.0 K/UL    ABS. EOSINOPHILS 0.1 0.0 - 0.4 K/UL    ABS. BASOPHILS 0.0 0.0 - 0.1 K/UL    ABS. IMM.  GRANS. 0.0 0.00 - 0.04 K/UL    DF AUTOMATED     METABOLIC PANEL, BASIC    Collection Time: 10/19/22  3:15 AM   Result Value Ref Range    Sodium 127 (L) 136 - 145 mmol/L    Potassium 3.4 (L) 3.5 - 5.1 mmol/L    Chloride 91 (L) 97 - 108 mmol/L    CO2 21 21 - 32 mmol/L    Anion gap 15 5 - 15 mmol/L    Glucose 204 (H) 65 - 100 mg/dL    BUN 47 (H) 6 - 20 MG/DL    Creatinine 4.91 (H) 0.70 - 1.30 MG/DL    BUN/Creatinine ratio 10 (L) 12 - 20      eGFR 15 (L) >60 ml/min/1.73m2    Calcium 7.6 (L) 8.5 - 10.1 MG/DL   TROPONIN-HIGH SENSITIVITY    Collection Time: 10/19/22  3:15 AM   Result Value Ref Range    Troponin-High Sensitivity 151 (HH) 0 - 76 ng/L   PHOSPHORUS    Collection Time: 10/19/22  3:15 AM   Result Value Ref Range    Phosphorus 6.7 (H) 2.6 - 4.7 MG/DL   MAGNESIUM    Collection Time: 10/19/22  3:15 AM   Result Value Ref Range    Magnesium 2.4 1.6 - 2.4 mg/dL   GLUCOSE, POC    Collection Time: 10/19/22  4:13 AM   Result Value Ref Range    Glucose (POC) 186 (H) 65 - 117 mg/dL    Performed by Osmar Merlos RN    GLUCOSTABILIZER    Collection Time: 10/19/22  4:15 AM   Result Value Ref Range    Glucose 186 mg/dL    Insulin order 1.3 units/hour    Insulin adminstered 1.3 units/hour    Multiplier 0.010     Low target 150 mg/dL    High target 200 mg/dL    D50 order 0.0 ml    D50 administered 0.00 ml    Minutes until next BG 60 min    Order initials      Administered initials     GLUCOSE, POC    Collection Time: 10/19/22  4:36 AM   Result Value Ref Range    Glucose (POC) 186 (H) 65 - 117 mg/dL    Performed by Yolanda Berry RN    GLUCOSE, POC    Collection Time: 10/19/22  5:26 AM   Result Value Ref Range    Glucose (POC) 180 (H) 65 - 117 mg/dL    Performed by Yolanda Berry RN    GLUCOSTABILIZER    Collection Time: 10/19/22  5:27 AM   Result Value Ref Range    Glucose 180 mg/dL    Insulin order 1.2 units/hour    Insulin adminstered 1.2 units/hour    Multiplier 0.010     Low target 150 mg/dL    High target 200 mg/dL    D50 order 0.0 ml    D50 administered 0.00 ml    Minutes until next BG 60 min    Order initials      Administered initials     GLUCOSE, POC    Collection Time: 10/19/22  6:30 AM   Result Value Ref Range    Glucose (POC) 182 (H) 65 - 117 mg/dL    Performed by Yolanda Berry RN    GLUCOSTABILIZER    Collection Time: 10/19/22  6:30 AM   Result Value Ref Range    Glucose 182 mg/dL    Insulin order 1.2 units/hour    Insulin adminstered 1.2 units/hour    Multiplier 0.010     Low target 150 mg/dL    High target 200 mg/dL    D50 order 0.0 ml    D50 administered 0.00 ml    Minutes until next  min    Order initials      Administered initials     METABOLIC PANEL, BASIC    Collection Time: 10/19/22  6:46 AM   Result Value Ref Range    Sodium 127 (L) 136 - 145 mmol/L    Potassium 3.5 3.5 - 5.1 mmol/L    Chloride 90 (L) 97 - 108 mmol/L    CO2 21 21 - 32 mmol/L    Anion gap 16 (H) 5 - 15 mmol/L    Glucose 200 (H) 65 - 100 mg/dL    BUN 47 (H) 6 - 20 MG/DL    Creatinine 5.04 (H) 0.70 - 1.30 MG/DL    BUN/Creatinine ratio 9 (L) 12 - 20      eGFR 14 (L) >60 ml/min/1.73m2    Calcium 7.9 (L) 8.5 - 10.1 MG/DL   MAGNESIUM    Collection Time: 10/19/22  6:46 AM   Result Value Ref Range    Magnesium 2.4 1.6 - 2.4 mg/dL   GLUCOSE, POC Collection Time: 10/19/22  8:46 AM   Result Value Ref Range    Glucose (POC) 194 (H) 65 - 117 mg/dL    Performed by Robert Justice    Collection Time: 10/19/22  8:47 AM   Result Value Ref Range    Glucose 194 mg/dL    Insulin order 1.3 units/hour    Insulin adminstered 1.3 units/hour    Multiplier 0.010     Low target 150 mg/dL    High target 200 mg/dL    D50 order 0.0 ml    D50 administered 0.00 ml    Minutes until next  min    Order initials gb     Administered initials gb    GLUCOSE, POC    Collection Time: 10/19/22 11:00 AM   Result Value Ref Range    Glucose (POC) 140 (H) 65 - 117 mg/dL    Performed by Serenity Fletcher    Collection Time: 10/19/22 11:00 AM   Result Value Ref Range    Glucose 140 mg/dL    Insulin order 0.0 units/hour    Insulin adminstered 0.0 units/hour    Multiplier 0.000     Low target 150 mg/dL    High target 200 mg/dL    D50 order 0.0 ml    D50 administered 0.00 ml    Minutes until next BG 60 min    Order initials RB     Administered initials RB    METABOLIC PANEL, BASIC    Collection Time: 10/19/22 11:03 AM   Result Value Ref Range    Sodium 129 (L) 136 - 145 mmol/L    Potassium 3.2 (L) 3.5 - 5.1 mmol/L    Chloride 92 (L) 97 - 108 mmol/L    CO2 24 21 - 32 mmol/L    Anion gap 13 5 - 15 mmol/L    Glucose 155 (H) 65 - 100 mg/dL    BUN 48 (H) 6 - 20 MG/DL    Creatinine 5.20 (H) 0.70 - 1.30 MG/DL    BUN/Creatinine ratio 9 (L) 12 - 20      eGFR 14 (L) >60 ml/min/1.73m2    Calcium 7.5 (L) 8.5 - 10.1 MG/DL   NUCLEAR CARDIAC STRESS TEST    Collection Time: 10/19/22 11:36 AM   Result Value Ref Range    Stress Target  bpm   GLUCOSE, POC    Collection Time: 10/19/22 12:05 PM   Result Value Ref Range    Glucose (POC) 153 (H) 65 - 117 mg/dL    Performed by Corene Landau PCT    GLUCOSTABILIZER    Collection Time: 10/19/22 12:18 PM   Result Value Ref Range    Glucose 153 mg/dL    Insulin order 0.5 units/hour    Insulin adminstered 0.5 units/hour Multiplier 0.000     Low target 150 mg/dL    High target 200 mg/dL    D50 order 0.0 ml    D50 administered 0.00 ml    Minutes until next BG 60 min    Order initials gb     Administered initials gb    GLUCOSE, POC    Collection Time: 10/19/22  1:25 PM   Result Value Ref Range    Glucose (POC) 133 (H) 65 - 117 mg/dL    Performed by Arlin April    Collection Time: 10/19/22  1:27 PM   Result Value Ref Range    Glucose 133 mg/dL    Insulin order 0.0 units/hour    Insulin adminstered 0.0 units/hour    Multiplier 0.000     Low target 150 mg/dL    High target 200 mg/dL    D50 order 0.0 ml    D50 administered 0.00 ml    Minutes until next BG 60 min    Order initials gb     Administered initials gb    GLUCOSE, POC    Collection Time: 10/19/22  2:08 PM   Result Value Ref Range    Glucose (POC) 138 (H) 65 - 117 mg/dL    Performed by [address] [place] [address] ([facility]), POC    Collection Time: 10/19/22  2:33 PM   Result Value Ref Range    Glucose (POC) 161 (H) 65 - 117 mg/dL    Performed by Arlin April    Collection Time: 10/19/22  2:35 PM   Result Value Ref Range    Glucose 161 mg/dL    Insulin order 0.5 units/hour    Insulin adminstered 0.5 units/hour    Multiplier 0.000     Low target 150 mg/dL    High target 200 mg/dL    D50 order 0.0 ml    D50 administered 0.00 ml    Minutes until next BG 60 min    Order initials gb     Administered initials gb    GLUCOSE, POC    Collection Time: 10/19/22  3:51 PM   Result Value Ref Range    Glucose (POC) 151 (H) 65 - 117 mg/dL    Performed by Kane Katz PCT    GLUCOSTABILIZER    Collection Time: 10/19/22  3:55 PM   Result Value Ref Range    Glucose 151 mg/dL    Insulin order 0.5 units/hour    Insulin adminstered 0.5 units/hour    Multiplier 0.000     Low target 150 mg/dL    High target 200 mg/dL    D50 order 0.0 ml    D50 administered 0.00 ml    Minutes until next BG 60 min    Order initials gb     Administered initials gb    GLUCOSE, POC    Collection Time: 10/19/22  4:45 PM   Result Value Ref Range    Glucose (POC) 217 (H) 65 - 117 mg/dL    Performed by Celso Ma PCT    GLUCOSE, POC    Collection Time: 10/19/22  4:55 PM   Result Value Ref Range    Glucose (POC) 217 (H) 65 - 117 mg/dL    Performed by Celso Ma PCT    GLUCOSTABILIZER    Collection Time: 10/19/22  5:00 PM   Result Value Ref Range    Glucose 217 mg/dL    Insulin order 1.6 units/hour    Insulin adminstered 1.6 units/hour    Multiplier 0.010     Low target 150 mg/dL    High target 200 mg/dL    D50 order 0.0 ml    D50 administered 0.00 ml    Minutes until next BG 60 min    Order initials gb     Administered initials gb        Review of most recent diabetes-related labs:  Lab Results   Component Value Date    HBA1C 7.2 (H) 09/26/2022    HBA1C 8.7 (H) 08/12/2022    HBA1C 12.1 (H) 07/10/2022    DCI9YBZL 13.2 04/01/2021    WGK6CPUO 13.7 09/25/2020    CHOL 192 11/15/2021    LDLC 89.4 11/15/2021    GFRAA 24 (L) 09/28/2022    GFRNA 20 (L) 09/28/2022    MCACR 11,439 (H) 04/01/2021    GADLT 43.0 (H) 07/14/2021    TSH 4.30 (H) 08/12/2022    VITD3 34.6 11/15/2021     Lab Key:  [id number] = IA-2 pancreatic islet cell autoantibody  CPEPL = C-peptide level  :EXT = External Lab  GADLT = COREY-65 autoantibody   INSUL = Insulin level  MCACR (or MALBEXT) = Urine Microalbumin (or External UM)  B12LT = B12 level   Assessment:     Hospital Problems  Date Reviewed: 6/21/2022            Codes Class Noted POA    [facility] ICD-10-CM: E10.10  ICD-9-CM: 250.13  11/18/2018 Unknown           Plan:     1) DKA >Pt is on insulin drip given his poor appetite, his AG has slowly improved from 30 to 15, also he is on HD which is helping control his BS and improve the AG, given he was able to eat some food today will likely be bridged soon. I advised to take him OFF the insulin pump given his multiple DKA admissions in the last few months, likely he will need more training to improve use or will be completely off it.  His improved a1c from 12 to 7.2% is likely more due to the multiple hospital admissions than insulin pump adherence. The dexcom measured at patient's bedside did not have discrepancy with patient's blood sugars and we discussed importance of him using Glucometer to check his sugars if his Dexcom came off or when he is feeling ill/sick. When asked if he knows how to use ketone strips patient says that he does not. His mother was not at bedside today. Plan is as follows:    -Remove the insulin pump ( to keep it in a safe place and not discard it), and use only Multiple daily insulin injections til further discussion with his outpatient Endocrinologist Dr Jacki Davidson  -Continue monitoring sugars with Dexcom and to check FS 3 times a day if the Dexcom comes off or whenever he is feeling ill, to rotate site of sensor placement and replacement every 10 days  -Learn how to use Ketone strips to manage DKA early    Once patient is ready to be bridged to take as follows: On Hemodialysis days (MWF)  Lantus = 14 units daily   Novolog = 3 units before each meal and low dose sliding scale, 1 unit for every 50 above 150  -200 take 1 extra unit  -250 take 2 extra units  -300 take 3 extra units   -350 take 4 extra units   -400 take 5 extra units  -450 take 6 extra units     On Non-hemodialysis days (TTSS)  Lantus = 18 units daily  Novolog = 3 units before each meal and low dose sliding scale, 1 unit for every 50 above 150  -200 take 1 extra unit  -250 take 2 extra units  -300 take 3 extra units   -350 take 4 extra units   -400 take 5 extra units  -450 take 6 extra units     I will continue to monitor his BGs while in the hospital with the plan of him going home with the Lantus/Novolog  (basal-bolus) regimen. His upcoming appt with Dr Jacki Davidson is on 10/28/2022 and I have updated her with plan that we will discontinue his insulin pump for now. Thank you very much for the consult. Signed By: Aleks Tenorio MD     October 19, 2022

## 2022-10-19 NOTE — PROGRESS NOTES
Hospitalist Progress Note    NAME: Marlys Granados   :  1980   MRN:  [id number]     Admit date: 10/16/2022    Today's date: 10/19/22    PCP: Yumi Holly MD      Anticipated discharge date: 10/21    Barriers:  DKA    Assessment / Plan:    DKA POA Admitted with , HCO3 12 with AG 30  Insulin dependent DM type 1 POA on home insulin pump  Recent DKA from pump malfunction 2022  Admit started on insulin drip per Glucostabilizer protocol              Glycemic control improved and AG normalized  Insulin gtt resumed, currently on hold  ? Trigger sepsis  Diabetic teaching  Last A1c= 7.2  Po diet as tolerated - diabetic restrictions  Endocrine consulted, plan to transition to subQ insulin rather than pump, will await orders  Will need to follow up as outpatient to determine if going back on pump is best for patient    Elevated troponin 17 --> 137 --> 133  Setting of DKA and recurrent N/V  At risk for underlying CAD  ASA  Tele, serial labs  Last echo 2022 with LVEF 55-60%, normal wall motion   Normal RV function  Similar bump with DKA admit 2022, saw [facility]  Plan for stress test tomorrow    Sepsis POA WBC 14.8, 106, lactate 1.6, procalcitonin 12.51  Possible UTI POA  CXR with no ASD or edema  CT abdomen/pelvis IMPRESSION  1. Questionable mild diffuse colitis. 2. Moderate distal esophagitis. 3. Trace pelvic free fluid. 4. Moderate diffuse bladder wall thickening. NS unable to get UA till today, was ordered since admit  UA 80 mg% ketones, 5 to 10 WBC, 5 to 10 RBCs, negative bacteria  blood and urine cultures NGTD  DC empiric ceftriaxone and vancomycin     Recurrent N/V POA  Generalized abdominal pain POA  ? Related to DKA, DKA resolved, but persistent   ? Lagging improvement in the DKA  CT abdomen/pelvis IMPRESSION  1. Questionable mild diffuse colitis. 2. Moderate distal esophagitis. 3. Trace pelvic free fluid.   4. Moderate diffuse bladder wall thickening. Normal lipase  Suspect related to DKA     ESRD POA  Recently started on HD, MWF on 9/7/2022  Nephrology consulted, c/w scheduled HD    Severe esophageal/gastric inflammation POA  Seen on previous EGD  IV PPI     Essential HTN POA  Resume Amlodipine, clonidine  Cont coreg     Recent urinary retention from prior admit POA hanesn removed  Hansen in placed 1 month in august/September 2022  Urology concerned for ?? BPH and atonic bladder per consult  Removed last admit several weeks ago, follows with [facility] urology  Still having some retention, may need another hansen. Has been getting straight cathed    Overweight POA Body mass index is 24.37 kg/m². Code status: Full  Prophylaxis: Hep SQ  Recommended Disposition: Home w/Family    Subjective:     Chief Complaint / Reason for Physician Visit  Patient seen and examined at bedside. Still with nausea vomiting and not much p.o. intake. Patient needs to go back on the insulin drip this morning for an increased gap. He currently says he is feeling improved. He is also having recurrent urinary retention. Review of Systems:  Symptom Y/N Comments  Symptom Y/N Comments   Fever/Chills n   Chest Pain n    Poor Appetite    Edema     Cough n   Abdominal Pain y    Sputum    Joint Pain     SOB/JOHNSTON n   Headache     Nausea/vomit y   Tolerating PT/OT     Diarrhea n   Tolerating Diet n    Constipation    Other       Could NOT obtain due to:      Objective:     VITALS:   Last 24hrs VS reviewed since prior progress note.  Most recent are:  Patient Vitals for the past 24 hrs:   Temp Pulse Resp BP SpO2   10/19/22 1415 -- 83 -- (!) 159/82 --   10/19/22 1400 -- 83 -- (!) 157/74 --   10/19/22 1345 -- 80 -- (!) 158/69 --   10/19/22 1330 -- 83 -- (!) 152/83 --   10/19/22 1315 -- 83 -- (!) 160/84 --   10/19/22 1300 -- 82 -- (!) 167/90 --   10/19/22 1245 -- 81 -- (!) 171/87 --   10/19/22 1230 -- 82 -- (!) 186/93 --   10/19/22 1218 -- 84 16 (!) 168/87 -- 10/19/22 1215 -- 85 -- (!) 168/87 --   10/19/22 1214 97.7 °F (36.5 °C) 82 16 -- 91 %   10/19/22 1200 -- 84 14 (!) 175/84 94 %   10/19/22 1050 98 °F (36.7 °C) 86 18 (!) 177/92 91 %   10/19/22 0800 98 °F (36.7 °C) 84 18 (!) 157/74 95 %   10/19/22 0414 -- 89 -- (!) 154/72 --   10/19/22 0325 98.4 °F (36.9 °C) 83 18 (!) 178/91 96 %   10/18/22 2234 98.3 °F (36.8 °C) 86 20 (!) 170/90 95 %   10/18/22 1916 98.1 °F (36.7 °C) 87 18 (!) 156/75 95 %   10/18/22 1519 98.3 °F (36.8 °C) 91 16 (!) 159/71 97 %         Intake/Output Summary (Last 24 hours) at 10/19/2022 1438  Last data filed at 10/19/2022 0943  Gross per 24 hour   Intake --   Output 825 ml   Net -825 ml          Wt Readings from Last 12 Encounters:   10/19/22 72.7 kg (160 lb 4.4 oz)   09/28/22 75.8 kg (167 lb 3.2 oz)   09/13/22 72.1 kg (159 lb)   09/06/22 81.6 kg (180 lb)   08/25/22 83.2 kg (183 lb 6.4 oz)   08/17/22 91.4 kg (201 lb 8 oz)   08/09/22 82.7 kg (182 lb 5.1 oz)   07/14/22 73.9 kg (163 lb)   07/10/22 63.5 kg (140 lb)   06/25/22 71.6 kg (157 lb 13.6 oz)   06/21/22 66.5 kg (146 lb 9.6 oz)   06/07/22 72.6 kg (160 lb)       PHYSICAL EXAM:    I had a face to face encounter and independently examined this patient on 10/19/22 as outlined below:    General: WD, WN. Alert, cooperative, actively vomiting  EENT:  PERRL. Anicteric sclerae. MMM  Neck:  No meningismus, no thyromegaly  Resp:  CTA bilaterally, no wheezing or rales. No accessory muscle use  CV:  Regular  rhythm,  No edema  GI:  Soft, Non distended, generalized tender. +Bowel sounds, no rebound  Neurologic:  Alert and oriented X 3, normal speech, non focal motor exam  Psych:   Not anxious nor agitated  Skin:  No rashes.   No jaundice    Reviewed most current lab test results and cultures  YES  Reviewed most current radiology test results   YES  Review and summation of old records today    NO  Reviewed patient's current orders and MAR    YES  PMH/SH reviewed - no change compared to H&P  ________________________________________________________________________  Care Plan discussed with:    Comments   Patient x    Family      RN x    Care Manager     Consultant                        Multidiciplinary team rounds were held today with , nursing, pharmacist and clinical coordinator. Patient's plan of care was discussed; medications were reviewed and discharge planning was addressed. ________________________________________________________________________      Comments   >50% of visit spent in counseling and coordination of care     ________________________________________________________________________  Marilu Lyon MD     Procedures: see electronic medical records for all procedures/Xrays and details which were not copied into this note but were reviewed prior to creation of Plan. LABS:  I reviewed today's most current labs and imaging studies. Pertinent labs include:  Recent Labs     10/19/22  0315 10/18/22  0440   WBC 7.7 12.5*   HGB 10.8* 10.7*   HCT 33.6* 34.6*    288       Recent Labs     10/19/22  1103 10/19/22  0646 10/19/22  0315 10/18/22  2230 10/18/22  1508 10/18/22  1050 10/18/22  0440   * 127* 127* 129*   < > 130* 131*   K 3.2* 3.5 3.4* 3.5   < > 3.6 4.0   CL 92* 90* 91* 92*   < > 92* 92*   CO2 24 21 21 25   < > 18* 18*   * 200* 204* 155*   < > 255* 254*   BUN 48* 47* 47* 46*   < > 44* 37*   CREA 5.20* 5.04* 4.91* 4.73*   < > 4.26* 3.75*   CA 7.5* 7.9* 7.6* 7.6*   < > 7.8* 8.2*   MG  --  2.4 2.4 2.4   < > 2.3  --    PHOS  --   --  6.7*  --   --  6.4*  --    ALB  --   --   --   --   --   --  2.6*   TBILI  --   --   --   --   --   --  0.5   ALT  --   --   --   --   --   --  22    < > = values in this interval not displayed.

## 2022-10-19 NOTE — PROGRESS NOTES
Nephrology Progress Note  [facility] / [address] [place] [address] Fawad Ferraro, [address]  Phone - [phone]  Fax - [phone]                 Patient: Lesa Edge                   YOB: 1980        Date- 10/19/2022                      Admit Date: 10/16/2022  CC: Follow up for ESRD          IMPRESSION & PLAN:   ESRD- hd mwf at [facility]  Hyponatremia  Hyperkalemia  DKA  H/o Urinary retention requiring hansen cath  Type 1 diabetes  Hypertension  Anemia      PLAN-  HD TODAY- no fluid removal on hd  4 K BATH WITH 3 CA BATH  NO fluid removal on hd  He is back on d5w and insulin gtt  Continue norvasc  Kcl 40 meq po  Continue hd mwf schedule. Subjective: Interval History:   -he is back on insulin gtt  Bp high-  K low  Na low - improving    Objective:   Vitals:    10/19/22 1245 10/19/22 1300 10/19/22 1315 10/19/22 1330   BP: (!) 171/87 (!) 167/90 (!) 160/84 (!) 152/83   Pulse: 81 82 83 83   Resp:       Temp:       TempSrc:       SpO2:       Weight:       Height:          10/18 0701 - 10/19 0700  In: 520 [P.O.:520]  Out: 1075 [Urine:1075]  Last 3 Recorded Weights in this Encounter    10/16/22 1244 10/19/22 0919   Weight: 76.7 kg (169 lb 3.2 oz) 72.7 kg (160 lb 4.4 oz)        Physical exam:    GEN:  NAD  NECK:  Supple, no thyromegaly  RESP: Clear  b/l, no  wheezing,   CVS: RRR,S1,S2   NEURO: non focal, normal speech  PERMACATH +  Chart reviewed. Pertinent Notes reviewed.      Data Review :  Recent Labs     10/19/22  1103 10/19/22  0646 10/19/22  0315 10/18/22  2230 10/18/22  1508 10/18/22  1050   * 127* 127* 129*   < > 130*   K 3.2* 3.5 3.4* 3.5   < > 3.6   CL 92* 90* 91* 92*   < > 92*   CO2 24 21 21 25   < > 18*   BUN 48* 47* 47* 46*   < > 44*   CREA 5.20* 5.04* 4.91* 4.73*   < > 4.26*   * 200* 204* 155*   < > 255*   CA 7.5* 7.9* 7.6* 7.6*   < > 7.8*   MG  --  2.4 2.4 2.4   < > 2.3   PHOS  -- --  6.7*  --   --  6.4*    < > = values in this interval not displayed. Recent Labs     10/19/22  0315 10/18/22  0440   WBC 7.7 12.5*   HGB 10.8* 10.7*   HCT 33.6* 34.6*    288       No results for input(s): FE, TIBC, PSAT, FERR in the last 72 hours.    Lab Results   Component Value Date/Time    Hemoglobin A1c 7.2 (H) 09/26/2022 09:34 AM    Hemoglobin A1c 8.7 (H) 08/12/2022 12:41 AM    Hemoglobin A1c 12.1 (H) 07/10/2022 07:51 PM        Lab Results   Component Value Date/Time    Microalbumin/Creat ratio (mg/g creat) 11,439 (H) 04/01/2021 10:00 AM    Microalbumin,urine random 151.00 04/01/2021 10:00 AM     Lab Results   Component Value Date/Time    NT pro-BNP 5,688 (H) 09/06/2022 09:16 AM    NT pro-BNP 11,477 (H) 08/16/2022 12:50 AM    NT pro-BNP 4,600 (H) 08/09/2022 02:13 PM    NT pro- (H) 07/06/2021 09:52 AM     US Results (most recent):  Medication list  reviewed  Current Facility-Administered Medications   Medication Dose Route Frequency    prochlorperazine (COMPAZINE) injection 10 mg  10 mg IntraVENous Q6H PRN    carvediloL (COREG) tablet 25 mg  25 mg Oral BID WITH MEALS    potassium chloride SR (KLOR-CON 10) tablet 40 mEq  40 mEq Oral ONCE    insulin lispro (HUMALOG) injection   SubCUTAneous TIDAC    glucose chewable tablet 16 g  4 Tablet Oral PRN    glucagon (GLUCAGEN) injection 1 mg  1 mg IntraMUSCular PRN    dextrose 10% infusion 0-250 mL  0-250 mL IntraVENous PRN    insulin regular (NOVOLIN R, HUMULIN R) 100 Units in 0.9% sodium chloride 100 mL infusion  0-50 Units/hr IntraVENous TITRATE    dextrose 5% infusion  100 mL/hr IntraVENous CONTINUOUS    cefTRIAXone (ROCEPHIN) 1 g in 0.9% sodium chloride (MBP/ADV) 50 mL MBP  1 g IntraVENous Q24H    aspirin chewable tablet 81 mg  81 mg Oral DAILY    lidocaine (XYLOCAINE) 2 % viscous solution 15 mL  15 mL Mouth/Throat PRN    benzocaine-menthoL (CEPACOL) lozenge 1 Lozenge  1 Lozenge Mucous Membrane PRN    insulin lispro (HUMALOG) injection SubCUTAneous Q4H    glucose chewable tablet 16 g  4 Tablet Oral PRN    glucagon (GLUCAGEN) injection 1 mg  1 mg IntraMUSCular PRN    dextrose 10% infusion 0-250 mL  0-250 mL IntraVENous PRN    morphine injection 2 mg  2 mg IntraVENous Q3H PRN    amLODIPine (NORVASC) tablet 10 mg  10 mg Oral DAILY    hydrALAZINE (APRESOLINE) 20 mg/mL injection 20 mg  20 mg IntraVENous Q6H PRN    DULoxetine (CYMBALTA) capsule 60 mg  60 mg Oral DAILY    finasteride (PROSCAR) tablet 5 mg  5 mg Oral DAILY    tamsulosin (FLOMAX) capsule 0.4 mg  0.4 mg Oral DAILY    sodium chloride (NS) flush 5-40 mL  5-40 mL IntraVENous Q8H    sodium chloride (NS) flush 5-40 mL  5-40 mL IntraVENous PRN    acetaminophen (TYLENOL) tablet 650 mg  650 mg Oral Q6H PRN    Or    acetaminophen (TYLENOL) suppository 650 mg  650 mg Rectal Q6H PRN    polyethylene glycol (MIRALAX) packet 17 g  17 g Oral DAILY PRN    ondansetron (ZOFRAN ODT) tablet 4 mg  4 mg Oral Q8H PRN    Or    ondansetron (ZOFRAN) injection 4 mg  4 mg IntraVENous Q6H PRN    heparin (porcine) injection 5,000 Units  5,000 Units SubCUTAneous Q8H    pantoprazole (PROTONIX) 40 mg in 0.9% sodium chloride 10 mL injection  40 mg IntraVENous DAILY        Timur Benites MD  10/19/2022

## 2022-10-19 NOTE — PROGRESS NOTES
Transition of Care Plan  RUR: 36%  DISPOSITION: The disposition plan is home with family assistance  [facility]  MWF at 9am  F/U with PCP/Specialist    Transport: mother        Reason for Readmission:     DKA Type 1         RUR Score/Risk Level:     36%    PCP: First and Last name:  Gianluca Jj MD   Name of Practice:    Are you a current patient: Yes/No:    Approximate date of last visit:    Can you participate in a virtual visit with your PCP:     Is a Care Conference indicated:  no      Did you attend your follow up appointment (s): If not, why not: yes          Resources/supports as identified by patient/family:   family       Top Challenges facing patient (as identified by patient/family and CM):     none    Finances/Medication cost?       Transportation        Support system or lack thereof? Living arrangements? Self-care/ADLs/Cognition? Current Advanced Directive/Advance Care Plan:             Plan for utilizing home health:   not recommended              Transition of Care Plan:    Based on readmission, the patient's previous Plan of Care   has been evaluated and/or modified. The current Transition of Care Plan is:               Patient lives at home with his mother. He is independent in care with a little assistance from his mother. Patient is a new dialysis patient at [facility]  MWF at [address] Management Interventions  PCP Verified by CM: Yes  Palliative Care Criteria Met (RRAT>21 & CHF Dx)?: No  Mode of Transport at Discharge:  Other (see comment) (family)  Transition of Care Consult (CM Consult): Discharge Planning  MyChart Signup: No  Discharge Durable Medical Equipment: No  Health Maintenance Reviewed: Yes  Physical Therapy Consult: No  Occupational Therapy Consult: No  Speech Therapy Consult: No  Support Systems: Parent(s)  Confirm Follow Up Transport: Family  [employer] Information Provided?: No  Discharge Location  Patient Expects to be Discharged to[de-identified] Home with family assistance    Readmission Assessment  Number of days since last admission?: 8-30 days  Previous disposition: Home with Family  Who is being interviewed?: Patient, Caregiver  What was the patient's/caregiver's perception as to why they think they needed to return back to the hospital?: Other (Comment)  Did you visit your Primary Care Physician after you left the hospital, before you returned this time?: Yes  Did you see a specialist, such as Cardiac, Pulmonary, Orthopedic Physician, etc. after you left the hospital?: Yes  Who advised the patient to return to the hospital?: Self-referral  Does the patient report anything that got in the way of taking their medications?: No  In our efforts to provide the best possible care to you and others like you, can you think of anything that we could have done to help you after you left the hospital the first time, so that you might not have needed to return so soon?: Other (Comment)    4:45 PM  MARIA DEL ROSARIO Olsen

## 2022-10-20 ENCOUNTER — APPOINTMENT (OUTPATIENT)
Dept: NUCLEAR MEDICINE | Age: 42
DRG: 420 | End: 2022-10-20
Attending: NURSE PRACTITIONER
Payer: MEDICAID

## 2022-10-20 LAB
ADMINISTERED INITIALS, ADMINIT: NORMAL
ANION GAP SERPL CALC-SCNC: 9 MMOL/L (ref 5–15)
BASOPHILS # BLD: 0 K/UL (ref 0–0.1)
BASOPHILS NFR BLD: 1 % (ref 0–1)
BUN SERPL-MCNC: 18 MG/DL (ref 6–20)
BUN/CREAT SERPL: 6 (ref 12–20)
CALCIUM SERPL-MCNC: 7.4 MG/DL (ref 8.5–10.1)
CHLORIDE SERPL-SCNC: 96 MMOL/L (ref 97–108)
CO2 SERPL-SCNC: 25 MMOL/L (ref 21–32)
CREAT SERPL-MCNC: 3.24 MG/DL (ref 0.7–1.3)
D50 ADMINISTERED, D50ADM: 0 ML
D50 ORDER, D50ORD: 0 ML
DIFFERENTIAL METHOD BLD: ABNORMAL
EOSINOPHIL # BLD: 0.2 K/UL (ref 0–0.4)
EOSINOPHIL NFR BLD: 5 % (ref 0–7)
ERYTHROCYTE [DISTWIDTH] IN BLOOD BY AUTOMATED COUNT: 18.4 % (ref 11.5–14.5)
GLUCOSE BLD STRIP.AUTO-MCNC: 117 MG/DL (ref 65–117)
GLUCOSE BLD STRIP.AUTO-MCNC: 123 MG/DL (ref 65–117)
GLUCOSE BLD STRIP.AUTO-MCNC: 128 MG/DL (ref 65–117)
GLUCOSE BLD STRIP.AUTO-MCNC: 150 MG/DL (ref 65–117)
GLUCOSE BLD STRIP.AUTO-MCNC: 162 MG/DL (ref 65–117)
GLUCOSE BLD STRIP.AUTO-MCNC: 176 MG/DL (ref 65–117)
GLUCOSE BLD STRIP.AUTO-MCNC: 189 MG/DL (ref 65–117)
GLUCOSE BLD STRIP.AUTO-MCNC: 193 MG/DL (ref 65–117)
GLUCOSE BLD STRIP.AUTO-MCNC: 198 MG/DL (ref 65–117)
GLUCOSE BLD STRIP.AUTO-MCNC: 205 MG/DL (ref 65–117)
GLUCOSE BLD STRIP.AUTO-MCNC: 89 MG/DL (ref 65–117)
GLUCOSE SERPL-MCNC: 228 MG/DL (ref 65–100)
GLUCOSE, GLC: 123 MG/DL
GLUCOSE, GLC: 128 MG/DL
GLUCOSE, GLC: 150 MG/DL
GLUCOSE, GLC: 162 MG/DL
GLUCOSE, GLC: 176 MG/DL
GLUCOSE, GLC: 189 MG/DL
GLUCOSE, GLC: 193 MG/DL
GLUCOSE, GLC: 198 MG/DL
GLUCOSE, GLC: 205 MG/DL
HCT VFR BLD AUTO: 35.9 % (ref 36.6–50.3)
HGB BLD-MCNC: 11.1 G/DL (ref 12.1–17)
HIGH TARGET, HITG: 200 MG/DL
IMM GRANULOCYTES # BLD AUTO: 0 K/UL (ref 0–0.04)
IMM GRANULOCYTES NFR BLD AUTO: 0 % (ref 0–0.5)
INSULIN ADMINSTERED, INSADM: 0 UNITS/HOUR
INSULIN ADMINSTERED, INSADM: 0 UNITS/HOUR
INSULIN ADMINSTERED, INSADM: 0.5 UNITS/HOUR
INSULIN ADMINSTERED, INSADM: 1.2 UNITS/HOUR
INSULIN ADMINSTERED, INSADM: 1.4 UNITS/HOUR
INSULIN ADMINSTERED, INSADM: 1.5 UNITS/HOUR
INSULIN ORDER, INSORD: 0 UNITS/HOUR
INSULIN ORDER, INSORD: 0 UNITS/HOUR
INSULIN ORDER, INSORD: 0.5 UNITS/HOUR
INSULIN ORDER, INSORD: 1.2 UNITS/HOUR
INSULIN ORDER, INSORD: 1.4 UNITS/HOUR
INSULIN ORDER, INSORD: 1.5 UNITS/HOUR
LOW TARGET, LOT: 150 MG/DL
LYMPHOCYTES # BLD: 0.7 K/UL (ref 0.8–3.5)
LYMPHOCYTES NFR BLD: 16 % (ref 12–49)
MAGNESIUM SERPL-MCNC: 2 MG/DL (ref 1.6–2.4)
MCH RBC QN AUTO: 25.8 PG (ref 26–34)
MCHC RBC AUTO-ENTMCNC: 30.9 G/DL (ref 30–36.5)
MCV RBC AUTO: 83.3 FL (ref 80–99)
MINUTES UNTIL NEXT BG, NBG: 120 MIN
MINUTES UNTIL NEXT BG, NBG: 60 MIN
MONOCYTES # BLD: 0.5 K/UL (ref 0–1)
MONOCYTES NFR BLD: 12 % (ref 5–13)
MULTIPLIER, MUL: 0
MULTIPLIER, MUL: 0.01
NEUTS SEG # BLD: 3 K/UL (ref 1.8–8)
NEUTS SEG NFR BLD: 67 % (ref 32–75)
NRBC # BLD: 0 K/UL (ref 0–0.01)
NRBC BLD-RTO: 0 PER 100 WBC
ORDER INITIALS, ORDINIT: NORMAL
PHOSPHATE SERPL-MCNC: 4 MG/DL (ref 2.6–4.7)
PLATELET # BLD AUTO: 226 K/UL (ref 150–400)
PMV BLD AUTO: 9.9 FL (ref 8.9–12.9)
POTASSIUM SERPL-SCNC: 3.5 MMOL/L (ref 3.5–5.1)
RBC # BLD AUTO: 4.31 M/UL (ref 4.1–5.7)
SERVICE CMNT-IMP: ABNORMAL
SERVICE CMNT-IMP: NORMAL
SERVICE CMNT-IMP: NORMAL
SODIUM SERPL-SCNC: 130 MMOL/L (ref 136–145)
STRESS BASELINE DIAS BP: 97 MMHG
STRESS BASELINE HR: 77 BPM
STRESS BASELINE ST DEPRESSION: 0 MM
STRESS BASELINE SYS BP: 153 MMHG
STRESS ESTIMATED WORKLOAD: 1 METS
STRESS EXERCISE DUR MIN: 0 MIN
STRESS EXERCISE DUR SEC: 51 SEC
STRESS O2 SAT PEAK: 100 %
STRESS O2 SAT REST: 100 %
STRESS PEAK DIAS BP: 89 MMHG
STRESS PEAK SYS BP: 160 MMHG
STRESS PERCENT HR ACHIEVED: 48 %
STRESS POST PEAK HR: 87 BPM
STRESS RATE PRESSURE PRODUCT: NORMAL BPM*MMHG
STRESS ST DEPRESSION: 0 MM
STRESS TARGET HR: 181 BPM
WBC # BLD AUTO: 4.5 K/UL (ref 4.1–11.1)

## 2022-10-20 PROCEDURE — 36415 COLL VENOUS BLD VENIPUNCTURE: CPT

## 2022-10-20 PROCEDURE — 74011000250 HC RX REV CODE- 250: Performed by: INTERNAL MEDICINE

## 2022-10-20 PROCEDURE — 65270000046 HC RM TELEMETRY

## 2022-10-20 PROCEDURE — 74011250637 HC RX REV CODE- 250/637: Performed by: INTERNAL MEDICINE

## 2022-10-20 PROCEDURE — 74011636637 HC RX REV CODE- 636/637: Performed by: INTERNAL MEDICINE

## 2022-10-20 PROCEDURE — 83735 ASSAY OF MAGNESIUM: CPT

## 2022-10-20 PROCEDURE — 84100 ASSAY OF PHOSPHORUS: CPT

## 2022-10-20 PROCEDURE — 80048 BASIC METABOLIC PNL TOTAL CA: CPT

## 2022-10-20 PROCEDURE — 85025 COMPLETE CBC W/AUTO DIFF WBC: CPT

## 2022-10-20 PROCEDURE — 74011250636 HC RX REV CODE- 250/636: Performed by: INTERNAL MEDICINE

## 2022-10-20 PROCEDURE — 74011250637 HC RX REV CODE- 250/637: Performed by: NURSE PRACTITIONER

## 2022-10-20 PROCEDURE — C9113 INJ PANTOPRAZOLE SODIUM, VIA: HCPCS | Performed by: INTERNAL MEDICINE

## 2022-10-20 PROCEDURE — 82962 GLUCOSE BLOOD TEST: CPT

## 2022-10-20 PROCEDURE — 74011000258 HC RX REV CODE- 258: Performed by: INTERNAL MEDICINE

## 2022-10-20 RX ORDER — MAGNESIUM SULFATE 100 %
4 CRYSTALS MISCELLANEOUS AS NEEDED
Status: DISCONTINUED | OUTPATIENT
Start: 2022-10-20 | End: 2022-10-25 | Stop reason: HOSPADM

## 2022-10-20 RX ORDER — HYDRALAZINE HYDROCHLORIDE 50 MG/1
50 TABLET, FILM COATED ORAL 3 TIMES DAILY
Status: DISCONTINUED | OUTPATIENT
Start: 2022-10-20 | End: 2022-10-25 | Stop reason: HOSPADM

## 2022-10-20 RX ORDER — INSULIN GLARGINE 100 [IU]/ML
14 INJECTION, SOLUTION SUBCUTANEOUS
Status: DISCONTINUED | OUTPATIENT
Start: 2022-10-21 | End: 2022-10-21

## 2022-10-20 RX ORDER — TETRAKIS(2-METHOXYISOBUTYLISOCYANIDE)COPPER(I) TETRAFLUOROBORATE 1 MG/ML
31.4 INJECTION, POWDER, LYOPHILIZED, FOR SOLUTION INTRAVENOUS
Status: COMPLETED | OUTPATIENT
Start: 2022-10-20 | End: 2022-10-20

## 2022-10-20 RX ORDER — INSULIN GLARGINE 100 [IU]/ML
18 INJECTION, SOLUTION SUBCUTANEOUS
Status: DISCONTINUED | OUTPATIENT
Start: 2022-10-20 | End: 2022-10-21

## 2022-10-20 RX ORDER — CLONIDINE 0.1 MG/24H
1 PATCH, EXTENDED RELEASE TRANSDERMAL
Status: DISCONTINUED | OUTPATIENT
Start: 2022-10-20 | End: 2022-10-25 | Stop reason: HOSPADM

## 2022-10-20 RX ORDER — DEXTROSE MONOHYDRATE 100 MG/ML
0-250 INJECTION, SOLUTION INTRAVENOUS AS NEEDED
Status: DISCONTINUED | OUTPATIENT
Start: 2022-10-20 | End: 2022-10-25 | Stop reason: HOSPADM

## 2022-10-20 RX ORDER — SODIUM CHLORIDE 0.9 % (FLUSH) 0.9 %
10 SYRINGE (ML) INJECTION AS NEEDED
Status: DISCONTINUED | OUTPATIENT
Start: 2022-10-20 | End: 2022-10-25 | Stop reason: HOSPADM

## 2022-10-20 RX ORDER — INSULIN LISPRO 100 [IU]/ML
3 INJECTION, SOLUTION INTRAVENOUS; SUBCUTANEOUS
Status: DISCONTINUED | OUTPATIENT
Start: 2022-10-20 | End: 2022-10-21

## 2022-10-20 RX ORDER — TETRAKIS(2-METHOXYISOBUTYLISOCYANIDE)COPPER(I) TETRAFLUOROBORATE 1 MG/ML
11 INJECTION, POWDER, LYOPHILIZED, FOR SOLUTION INTRAVENOUS
Status: COMPLETED | OUTPATIENT
Start: 2022-10-20 | End: 2022-10-20

## 2022-10-20 RX ORDER — INSULIN LISPRO 100 [IU]/ML
INJECTION, SOLUTION INTRAVENOUS; SUBCUTANEOUS
Status: DISCONTINUED | OUTPATIENT
Start: 2022-10-20 | End: 2022-10-25 | Stop reason: HOSPADM

## 2022-10-20 RX ADMIN — AMLODIPINE BESYLATE 10 MG: 5 TABLET ORAL at 08:04

## 2022-10-20 RX ADMIN — HEPARIN SODIUM 5000 UNITS: 5000 INJECTION INTRAVENOUS; SUBCUTANEOUS at 21:52

## 2022-10-20 RX ADMIN — TAMSULOSIN HYDROCHLORIDE 0.4 MG: 0.4 CAPSULE ORAL at 08:03

## 2022-10-20 RX ADMIN — CARVEDILOL 25 MG: 12.5 TABLET, FILM COATED ORAL at 08:03

## 2022-10-20 RX ADMIN — MORPHINE SULFATE 2 MG: 2 INJECTION, SOLUTION INTRAMUSCULAR; INTRAVENOUS at 02:24

## 2022-10-20 RX ADMIN — REGADENOSON 0.4 MG: 0.08 INJECTION, SOLUTION INTRAVENOUS at 11:10

## 2022-10-20 RX ADMIN — Medication 3 UNITS: at 17:16

## 2022-10-20 RX ADMIN — HYDRALAZINE HYDROCHLORIDE 50 MG: 50 TABLET, FILM COATED ORAL at 12:33

## 2022-10-20 RX ADMIN — CARVEDILOL 25 MG: 12.5 TABLET, FILM COATED ORAL at 16:24

## 2022-10-20 RX ADMIN — ONDANSETRON 4 MG: 2 INJECTION INTRAMUSCULAR; INTRAVENOUS at 07:02

## 2022-10-20 RX ADMIN — INSULIN GLARGINE 18 UNITS: 100 INJECTION, SOLUTION SUBCUTANEOUS at 09:35

## 2022-10-20 RX ADMIN — SODIUM CHLORIDE, PRESERVATIVE FREE 10 ML: 5 INJECTION INTRAVENOUS at 14:00

## 2022-10-20 RX ADMIN — HYDRALAZINE HYDROCHLORIDE 50 MG: 50 TABLET, FILM COATED ORAL at 16:21

## 2022-10-20 RX ADMIN — TETRAKIS(2-METHOXYISOBUTYLISOCYANIDE)COPPER(I) TETRAFLUOROBORATE 11 MILLICURIE: 1 INJECTION, POWDER, LYOPHILIZED, FOR SOLUTION INTRAVENOUS at 07:50

## 2022-10-20 RX ADMIN — HYDRALAZINE HYDROCHLORIDE 20 MG: 20 INJECTION INTRAMUSCULAR; INTRAVENOUS at 04:46

## 2022-10-20 RX ADMIN — TETRAKIS(2-METHOXYISOBUTYLISOCYANIDE)COPPER(I) TETRAFLUOROBORATE 31.4 MILLICURIE: 1 INJECTION, POWDER, LYOPHILIZED, FOR SOLUTION INTRAVENOUS at 12:00

## 2022-10-20 RX ADMIN — MORPHINE SULFATE 2 MG: 2 INJECTION, SOLUTION INTRAMUSCULAR; INTRAVENOUS at 04:46

## 2022-10-20 RX ADMIN — DEXTROSE MONOHYDRATE 100 ML/HR: 50 INJECTION, SOLUTION INTRAVENOUS at 09:35

## 2022-10-20 RX ADMIN — MORPHINE SULFATE 2 MG: 2 INJECTION, SOLUTION INTRAMUSCULAR; INTRAVENOUS at 17:20

## 2022-10-20 RX ADMIN — ONDANSETRON 4 MG: 2 INJECTION INTRAMUSCULAR; INTRAVENOUS at 12:33

## 2022-10-20 RX ADMIN — MORPHINE SULFATE 2 MG: 2 INJECTION, SOLUTION INTRAMUSCULAR; INTRAVENOUS at 08:01

## 2022-10-20 RX ADMIN — ASPIRIN 81 MG CHEWABLE TABLET 81 MG: 81 TABLET CHEWABLE at 08:04

## 2022-10-20 RX ADMIN — SODIUM CHLORIDE 1.2 UNITS/HR: 9 INJECTION, SOLUTION INTRAVENOUS at 07:00

## 2022-10-20 RX ADMIN — HYDRALAZINE HYDROCHLORIDE 50 MG: 50 TABLET, FILM COATED ORAL at 21:52

## 2022-10-20 RX ADMIN — SODIUM CHLORIDE, PRESERVATIVE FREE 10 ML: 5 INJECTION INTRAVENOUS at 02:24

## 2022-10-20 RX ADMIN — HEPARIN SODIUM 5000 UNITS: 5000 INJECTION INTRAVENOUS; SUBCUTANEOUS at 14:55

## 2022-10-20 RX ADMIN — SODIUM CHLORIDE 40 MG: 9 INJECTION, SOLUTION INTRAMUSCULAR; INTRAVENOUS; SUBCUTANEOUS at 08:04

## 2022-10-20 RX ADMIN — DULOXETINE HYDROCHLORIDE 60 MG: 30 CAPSULE, DELAYED RELEASE ORAL at 08:04

## 2022-10-20 RX ADMIN — SODIUM CHLORIDE 0.5 UNITS/HR: 9 INJECTION, SOLUTION INTRAVENOUS at 01:14

## 2022-10-20 RX ADMIN — SODIUM CHLORIDE, PRESERVATIVE FREE 10 ML: 5 INJECTION INTRAVENOUS at 21:53

## 2022-10-20 RX ADMIN — HEPARIN SODIUM 5000 UNITS: 5000 INJECTION INTRAVENOUS; SUBCUTANEOUS at 04:47

## 2022-10-20 RX ADMIN — MORPHINE SULFATE 2 MG: 2 INJECTION, SOLUTION INTRAMUSCULAR; INTRAVENOUS at 12:33

## 2022-10-20 RX ADMIN — FINASTERIDE 5 MG: 5 TABLET, FILM COATED ORAL at 08:04

## 2022-10-20 NOTE — PROGRESS NOTES
Hospitalist Progress Note    NAME: Leisa Bazzi   :  1980   MRN:  [id number]     Admit date: 10/16/2022    Today's date: 10/20/22    PCP: Poornima Guajardo MD      Anticipated discharge date: 10/21    Barriers:  DKA    Assessment / Plan:    DKA POA Admitted with , HCO3 12 with AG 30  Insulin dependent DM type 1 POA on home insulin pump  Recent DKA from pump malfunction 2022  Admit started on insulin drip per Glucostabilizer protocol              Glycemic control improved and AG normalized  Insulin gtt resumed, currently on hold  ? Trigger sepsis  Diabetic teaching  Last A1c= 7.2  Po diet as tolerated - diabetic restrictions  Endocrine consulted, plan to transitioned to subQ insulin rather than pump, recs appreciated and ordered  ADA diet  Will need to follow up as outpatient to determine if going back on pump is best for patient    Elevated troponin 17 --> 137 --> 133  Setting of DKA and recurrent N/V  At risk for underlying CAD  ASA  Tele, serial labs  Last echo 2022 with LVEF 55-60%, normal wall motion   Normal RV function  Similar bump with DKA admit 2022, saw [facility]  Underwent stress test today. Await results    Sepsis POA WBC 14.8, 106, lactate 1.6, procalcitonin 12.51  Possible UTI POA  CXR with no ASD or edema  CT abdomen/pelvis IMPRESSION  1. Questionable mild diffuse colitis. 2. Moderate distal esophagitis. 3. Trace pelvic free fluid. 4. Moderate diffuse bladder wall thickening. NS unable to get UA till today, was ordered since admit  UA 80 mg% ketones, 5 to 10 WBC, 5 to 10 RBCs, negative bacteria  blood and urine cultures NGTD  DC empiric ceftriaxone and vancomycin     Recurrent N/V POA  Generalized abdominal pain POA  ? Related to DKA, DKA resolved, but persistent   ? Lagging improvement in the DKA  CT abdomen/pelvis IMPRESSION  1. Questionable mild diffuse colitis. 2. Moderate distal esophagitis.   3. Trace pelvic free fluid.  4. Moderate diffuse bladder wall thickening. Normal lipase  Suspect related to DKA     ESRD POA  Recently started on HD, MWF on 9/7/2022  Nephrology consulted, c/w scheduled HD    Severe esophageal/gastric inflammation POA  Seen on previous EGD  IV PPI     Essential HTN POA  Resume Amlodipine, resume clonidine patch  Cont coreg  Hydralazine added     Recent urinary retention from prior admit POA hansen removed  Hansen in placed 1 month in august/September 2022  Urology concerned for ?? BPH and atonic bladder per consult  Removed last admit several weeks ago, follows with [facility]  Still having some retention, may need another hansen. Has been getting straight cathed    Overweight POA Body mass index is 24.37 kg/m². Code status: Full  Prophylaxis: Hep SQ  Recommended Disposition: Home w/Family    Subjective:     Chief Complaint / Reason for Physician Visit  Patient seen and examined at bedside. Just got back from stress test and was having some chest discomfort. He is no longer having nausea / vomiting. Says he feels well. He is also having recurrent urinary retention. Review of Systems:  Symptom Y/N Comments  Symptom Y/N Comments   Fever/Chills n   Chest Pain n    Poor Appetite    Edema     Cough n   Abdominal Pain y    Sputum    Joint Pain     SOB/JOHNSTON n   Headache     Nausea/vomit y   Tolerating PT/OT     Diarrhea n   Tolerating Diet n    Constipation    Other       Could NOT obtain due to:      Objective:     VITALS:   Last 24hrs VS reviewed since prior progress note.  Most recent are:  Patient Vitals for the past 24 hrs:   Temp Pulse Resp BP SpO2   10/20/22 1220 97.5 °F (36.4 °C) 80 18 (!) 147/81 95 %   10/20/22 0803 97.4 °F (36.3 °C) 79 18 (!) 152/87 97 %   10/20/22 0736 -- 80 -- (!) 152/87 --   10/20/22 0430 98 °F (36.7 °C) 81 16 (!) 181/93 96 %   10/19/22 2304 98.1 °F (36.7 °C) 78 16 (!) 168/80 98 %   10/19/22 1946 98 °F (36.7 °C) 82 16 (!) 146/74 95 %   10/19/22 1545 -- 89 -- 139/65 --   10/19/22 1541 97.9 °F (36.6 °C) -- -- -- 93 %   10/19/22 1530 97.9 °F (36.6 °C) 83 16 138/60 93 %   10/19/22 1515 -- 82 -- (!) 161/68 --   10/19/22 1500 98 °F (36.7 °C) 82 16 (!) 158/70 --   10/19/22 1445 -- 88 -- (!) 147/80 --   10/19/22 1430 -- 85 -- (!) 157/84 --   10/19/22 1415 -- 83 -- (!) 159/82 --   10/19/22 1400 -- 83 -- (!) 157/74 --   10/19/22 1345 -- 80 -- (!) 158/69 --   10/19/22 1330 -- 83 -- (!) 152/83 --         Intake/Output Summary (Last 24 hours) at 10/20/2022 1321  Last data filed at 10/19/2022 2200  Gross per 24 hour   Intake 1480 ml   Output 600 ml   Net 880 ml          Wt Readings from Last 12 Encounters:   10/19/22 72.7 kg (160 lb 4.4 oz)   09/28/22 75.8 kg (167 lb 3.2 oz)   09/13/22 72.1 kg (159 lb)   09/06/22 81.6 kg (180 lb)   08/25/22 83.2 kg (183 lb 6.4 oz)   08/17/22 91.4 kg (201 lb 8 oz)   08/09/22 82.7 kg (182 lb 5.1 oz)   07/14/22 73.9 kg (163 lb)   07/10/22 63.5 kg (140 lb)   06/25/22 71.6 kg (157 lb 13.6 oz)   06/21/22 66.5 kg (146 lb 9.6 oz)   06/07/22 72.6 kg (160 lb)       PHYSICAL EXAM:    I had a face to face encounter and independently examined this patient on 10/20/22 as outlined below:    General: WD, WN. Alert, cooperative, actively vomiting  EENT:  PERRL. Anicteric sclerae. MMM  Neck:  No meningismus, no thyromegaly  Resp:  CTA bilaterally, no wheezing or rales. No accessory muscle use  CV:  Regular  rhythm,  No edema  GI:  Soft, Non distended, generalized tender. +Bowel sounds, no rebound  Neurologic:  Alert and oriented X 3, normal speech, non focal motor exam  Psych:   Not anxious nor agitated  Skin:  No rashes.   No jaundice    Reviewed most current lab test results and cultures  YES  Reviewed most current radiology test results   YES  Review and summation of old records today    NO  Reviewed patient's current orders and MAR    YES  PMH/SH reviewed - no change compared to H&P  ________________________________________________________________________  Care Plan discussed with:    Comments   Patient x    Family      RN x    Care Manager     Consultant                        Multidiciplinary team rounds were held today with , nursing, pharmacist and clinical coordinator. Patient's plan of care was discussed; medications were reviewed and discharge planning was addressed. ________________________________________________________________________      Comments   >50% of visit spent in counseling and coordination of care     ________________________________________________________________________  Bruno Hernandes MD     Procedures: see electronic medical records for all procedures/Xrays and details which were not copied into this note but were reviewed prior to creation of Plan. LABS:  I reviewed today's most current labs and imaging studies. Pertinent labs include:  Recent Labs     10/20/22  0426 10/19/22  0315 10/18/22  0440   WBC 4.5 7.7 12.5*   HGB 11.1* 10.8* 10.7*   HCT 35.9* 33.6* 34.6*    257 288       Recent Labs     10/20/22  0426 10/19/22  2211 10/19/22  1836 10/19/22  1103 10/19/22  0646 10/19/22  0315 10/18/22  1508 10/18/22  1050 10/18/22  0440   * 135* 134*   < > 127* 127*   < > 130* 131*   K 3.5 3.6 3.2*   < > 3.5 3.4*   < > 3.6 4.0   CL 96* 98 98   < > 90* 91*   < > 92* 92*   CO2 25 28 31   < > 21 21   < > 18* 18*   * 135* 225*   < > 200* 204*   < > 255* 254*   BUN 18 17 15   < > 47* 47*   < > 44* 37*   CREA 3.24* 2.87* 2.66*   < > 5.04* 4.91*   < > 4.26* 3.75*   CA 7.4* 7.6* 7.7*   < > 7.9* 7.6*   < > 7.8* 8.2*   MG 2.0 2.0  --   --  2.4 2.4   < > 2.3  --    PHOS 4.0  --   --   --   --  6.7*  --  6.4*  --    ALB  --   --   --   --   --   --   --   --  2.6*   TBILI  --   --   --   --   --   --   --   --  0.5   ALT  --   --   --   --   --   --   --   --  22    < > = values in this interval not displayed.

## 2022-10-20 NOTE — PROGRESS NOTES
End of Shift Note  TY (oncoming nurse) by Juliane Cox RN (offgoing nurse). Report included the following information NSR      Shift worked:    7-7   Shift summary and any significant changes:     PT HAD POOR APPETITE THEN BY END OF SHIFT SAID HE HAD HUNGER ATE BROTH     Concerns for physician to address:  When to wean off the drip. Stress test to r/o pain source. Still can not void MD aware     Zone phone for oncoming shift:          Activity:  Activity Level: Up with Assistance  Number times ambulated in hallways past shift: 1  Number of times OOB to chair past shift: 0    Cardiac:   Cardiac Monitoring: Yes      Cardiac Rhythm: Sinus Rhythm    Access:  Current line(s): PIV     Genitourinary:   Urinary status: due to void    Respiratory:   O2 Device: None (Room air)  Chronic home O2 use?: NO  Incentive spirometer at bedside: NO       GI:  Last Bowel Movement Date: 10/16/22  Current diet:  ADULT DIET Clear Liquid  DIET NPO  Passing flatus: NO  Tolerating current diet: NO       Pain Management:   Patient states pain is manageable on current regimen: YES    Skin:  Corey Score: 18  Interventions: turn team, speciality bed, float heels, PT/OT consult, and nutritional support     Patient Safety:  Fall Score:  Total Score: 2  Interventions: bed/chair alarm, gripper socks, pt to call before getting OOB, and stay with me (per policy)  High Fall Risk: Yes    Length of Stay:  Expected LOS: 3d 19h  Actual LOS: 3      Juliane Cox RN

## 2022-10-20 NOTE — PROGRESS NOTES
2200 - straight cathed pt after bladder scan    0330 - pt voided into toilet. Pt educated on importance of measuring the urine in the urinal. Pt verbalizes understanding.

## 2022-10-20 NOTE — PROGRESS NOTES
0900  Pt FSBS 123 insulin drip on hold at this time. Dr El Barraza notified and 18 unit insulin lantus given as order will cont monitoring . Patient return to the unit

## 2022-10-20 NOTE — PROGRESS NOTES
Transition of Care Plan  RUR: 36%  DISPOSITION: The disposition plan is home with family assistance  [facility]  MWF at 9am  F/U with PCP/Specialist    Transport: mother    CM attended IDR. Pt has an SHAE of 10/21/22 per attending's note. CM will continue to follow.     KAVEH Martinez  Care Management, [address]

## 2022-10-20 NOTE — PROGRESS NOTES
[facility]  [address]  [phone]  [email or web address]         Cardiology Progress Note      10/20/2022 10:06 AM    Admit Date: 10/16/2022    Admit Diagnosis:   DKA, type 1 ([address] [E10.10]    Interval History/Subjective: Yas Aguilar still having emesis. CP persists, not worsened; still only when he has vomiting. Pending lexiscan stress test.    Visit Vitals  BP (!) 152/87 (BP 1 Location: Right upper arm, BP Patient Position: At rest)   Pulse 79   Temp 97.4 °F (36.3 °C)   Resp 18   Ht 5' 8\" (1.727 m)   Wt 72.7 kg (160 lb 4.4 oz)   SpO2 97% Comment: Simultaneous filing. User may not have seen previous data.    BMI 24.37 kg/m²       Current Facility-Administered Medications   Medication Dose Route Frequency    insulin glargine (LANTUS) injection 18 Units  18 Units SubCUTAneous EVERY TUES,THUR,SAT,SUN    [START ON 10/21/2022] insulin glargine (LANTUS) injection 14 Units  14 Units SubCUTAneous Q MON, WED & FRI    insulin lispro (HUMALOG) injection 3 Units  3 Units SubCUTAneous TIDAC    hydrALAZINE (APRESOLINE) tablet 50 mg  50 mg Oral TID    prochlorperazine (COMPAZINE) injection 10 mg  10 mg IntraVENous Q6H PRN    carvediloL (COREG) tablet 25 mg  25 mg Oral BID WITH MEALS    insulin lispro (HUMALOG) injection   SubCUTAneous TIDAC    glucose chewable tablet 16 g  4 Tablet Oral PRN    glucagon (GLUCAGEN) injection 1 mg  1 mg IntraMUSCular PRN    dextrose 10% infusion 0-250 mL  0-250 mL IntraVENous PRN    insulin regular (NOVOLIN R, HUMULIN R) 100 Units in 0.9% sodium chloride 100 mL infusion  0-50 Units/hr IntraVENous TITRATE    dextrose 5% infusion  100 mL/hr IntraVENous CONTINUOUS    aspirin chewable tablet 81 mg  81 mg Oral DAILY    lidocaine (XYLOCAINE) 2 % viscous solution 15 mL  15 mL Mouth/Throat PRN    benzocaine-menthoL (CEPACOL) lozenge 1 Lozenge  1 Lozenge Mucous Membrane PRN    glucose chewable tablet 16 g  4 Tablet Oral PRN glucagon (GLUCAGEN) injection 1 mg  1 mg IntraMUSCular PRN    dextrose 10% infusion 0-250 mL  0-250 mL IntraVENous PRN    morphine injection 2 mg  2 mg IntraVENous Q3H PRN    amLODIPine (NORVASC) tablet 10 mg  10 mg Oral DAILY    hydrALAZINE (APRESOLINE) 20 mg/mL injection 20 mg  20 mg IntraVENous Q6H PRN    DULoxetine (CYMBALTA) capsule 60 mg  60 mg Oral DAILY    finasteride (PROSCAR) tablet 5 mg  5 mg Oral DAILY    tamsulosin (FLOMAX) capsule 0.4 mg  0.4 mg Oral DAILY    sodium chloride (NS) flush 5-40 mL  5-40 mL IntraVENous Q8H    sodium chloride (NS) flush 5-40 mL  5-40 mL IntraVENous PRN    acetaminophen (TYLENOL) tablet 650 mg  650 mg Oral Q6H PRN    Or    acetaminophen (TYLENOL) suppository 650 mg  650 mg Rectal Q6H PRN    polyethylene glycol (MIRALAX) packet 17 g  17 g Oral DAILY PRN    ondansetron (ZOFRAN ODT) tablet 4 mg  4 mg Oral Q8H PRN    Or    ondansetron (ZOFRAN) injection 4 mg  4 mg IntraVENous Q6H PRN    heparin (porcine) injection 5,000 Units  5,000 Units SubCUTAneous Q8H    pantoprazole (PROTONIX) 40 mg in 0.9% sodium chloride 10 mL injection  40 mg IntraVENous DAILY       Objective:      Physical Exam:  General: Well developed, in no acute distress, cooperative and alert  HEENT: No carotid bruits, PEERL, EOM intact. Heart:  reg rate and rhythm; normal S1/S2; no murmurs  Respiratory: Clear bilaterally x 4, no wheezing or rales  Abdomen:   Soft, non-tender, no distention, no masses. + BS. Extremities:  Normal cap refill, no cyanosis, atraumatic. No edema.   Neuro: A&Ox3, speech clear  Skin: Skin color is normal. Non diaphoretic  Vascular: 2+ pulses symmetric in all extremities    Data Review:   Recent Labs     10/20/22  0426 10/19/22  0315 10/18/22  0440   WBC 4.5 7.7 12.5*   HGB 11.1* 10.8* 10.7*   HCT 35.9* 33.6* 34.6*    257 288     Recent Labs     10/20/22  0426 10/19/22  2211 10/19/22  1836 10/19/22  1103 10/19/22  5348 10/19/22  0315 10/18/22  1508 10/18/22  1050 10/18/22  0440 * 135* 134*   < > 127* 127*   < > 130* 131*   K 3.5 3.6 3.2*   < > 3.5 3.4*   < > 3.6 4.0   CL 96* 98 98   < > 90* 91*   < > 92* 92*   CO2 25 28 31   < > 21 21   < > 18* 18*   * 135* 225*   < > 200* 204*   < > 255* 254*   BUN 18 17 15   < > 47* 47*   < > 44* 37*   CREA 3.24* 2.87* 2.66*   < > 5.04* 4.91*   < > 4.26* 3.75*   CA 7.4* 7.6* 7.7*   < > 7.9* 7.6*   < > 7.8* 8.2*   MG 2.0 2.0  --   --  2.4 2.4   < > 2.3  --    PHOS 4.0  --   --   --   --  6.7*  --  6.4*  --    ALB  --   --   --   --   --   --   --   --  2.6*   TBILI  --   --   --   --   --   --   --   --  0.5   ALT  --   --   --   --   --   --   --   --  22    < > = values in this interval not displayed. No results for input(s): TROIQ, CPK, CKMB in the last 72 hours. Intake/Output Summary (Last 24 hours) at 10/20/2022 1006  Last data filed at 10/19/2022 2200  Gross per 24 hour   Intake 1480 ml   Output 600 ml   Net 880 ml        Telemetry: sinus rhythm    Radiology Results in the last 24 hours:  No results found. Assessment:     Active Problems:    DKA, type 1 ([address] (11/18/2018)        Plan:     Elevated troponin  Trops 17 --> 137 --> 133 --> 151  Likely in the setting of DKA and recurrent N/V, similar to previous admission in 8/2022  Lexiscan stress test pending  Echo normal in 8/2022     2. DKA  Care per primary team     3.   HTN  Continue amlodipine, carvedilol  Add hydralazine 50 mg TID    Jeffery Nolasco NP

## 2022-10-20 NOTE — PROGRESS NOTES
[facility] MED: Lexiscan MPI study completed. Results pending. Please check with cardiology regarding diet.

## 2022-10-20 NOTE — PROGRESS NOTES
End of Shift Note    Bedside shift change report given to [address] (oncoming nurse) by Batsheva Coyle RN (offgoing nurse). Report included the following information SBAR    Shift worked:  7am-7pm     Shift summary and any significant changes:     Inulin drip off ss start stress test completed      Concerns for physician to address:  non     Zone phone for oncoming shift:          Activity:  Activity Level: Up with Assistance  Number times ambulated in hallways past shift: 0  Number of times OOB to chair past shift: 2    Cardiac:   Cardiac Monitoring: Yes      Cardiac Rhythm: Sinus Rhythm    Access:  Current line(s): PIV     Genitourinary:   Urinary status: voiding    Respiratory:   O2 Device: None (Room air)  Chronic home O2 use?: NO  Incentive spirometer at bedside: NO       GI:  Last Bowel Movement Date: 10/20/22  Current diet:  ADULT DIET Regular; 3 carb choices (45 gm/meal); Low Potassium (Less than 3000 mg/day)  Passing flatus: YES  Tolerating current diet: YES       Pain Management:   Patient states pain is manageable on current regimen: YES    Skin:  Corey Score: 17  Interventions: PT/OT consult    Patient Safety:  Fall Score:  Total Score: 2  Interventions: bed/chair alarm  High Fall Risk: Yes    Length of Stay:  Expected LOS: 3d 19h  Actual LOS: 4      Ti Nabil Sanchez RN

## 2022-10-20 NOTE — PROGRESS NOTES
[facility]  [address] [facility] [place], [address] [facility]  [phone]  www. [employer]      IMPRESSION: No ischemia or infarct demonstrated. LVEF 51%  Nuclear stress test is negative for ischemia preserved ejection fraction. Continue current antihypertensive therapy no further recommendations from cardiology will sign off.     Esther Baptiste DNP, ANP-BC

## 2022-10-20 NOTE — PROGRESS NOTES
1000 Patient off the unit   1220 patient back to the unit FSBS check 150  Dr Johnnie Kinsey at the bed side stated we stop the drip will cont the sliding scale.

## 2022-10-21 LAB
ANION GAP SERPL CALC-SCNC: 8 MMOL/L (ref 5–15)
BUN SERPL-MCNC: 21 MG/DL (ref 6–20)
BUN/CREAT SERPL: 5 (ref 12–20)
CALCIUM SERPL-MCNC: 8 MG/DL (ref 8.5–10.1)
CHLORIDE SERPL-SCNC: 98 MMOL/L (ref 97–108)
CO2 SERPL-SCNC: 28 MMOL/L (ref 21–32)
CREAT SERPL-MCNC: 4.15 MG/DL (ref 0.7–1.3)
GLUCOSE BLD STRIP.AUTO-MCNC: 121 MG/DL (ref 65–117)
GLUCOSE BLD STRIP.AUTO-MCNC: 169 MG/DL (ref 65–117)
GLUCOSE BLD STRIP.AUTO-MCNC: 48 MG/DL (ref 65–117)
GLUCOSE BLD STRIP.AUTO-MCNC: 56 MG/DL (ref 65–117)
GLUCOSE BLD STRIP.AUTO-MCNC: 62 MG/DL (ref 65–117)
GLUCOSE BLD STRIP.AUTO-MCNC: 69 MG/DL (ref 65–117)
GLUCOSE BLD STRIP.AUTO-MCNC: 74 MG/DL (ref 65–117)
GLUCOSE BLD STRIP.AUTO-MCNC: 91 MG/DL (ref 65–117)
GLUCOSE BLD STRIP.AUTO-MCNC: 95 MG/DL (ref 65–117)
GLUCOSE BLD STRIP.AUTO-MCNC: 98 MG/DL (ref 65–117)
GLUCOSE SERPL-MCNC: 44 MG/DL (ref 65–100)
MAGNESIUM SERPL-MCNC: 2.1 MG/DL (ref 1.6–2.4)
POTASSIUM SERPL-SCNC: 3.1 MMOL/L (ref 3.5–5.1)
SERVICE CMNT-IMP: ABNORMAL
SERVICE CMNT-IMP: NORMAL
SODIUM SERPL-SCNC: 134 MMOL/L (ref 136–145)

## 2022-10-21 PROCEDURE — 90935 HEMODIALYSIS ONE EVALUATION: CPT

## 2022-10-21 PROCEDURE — 74011000250 HC RX REV CODE- 250: Performed by: INTERNAL MEDICINE

## 2022-10-21 PROCEDURE — 65270000046 HC RM TELEMETRY

## 2022-10-21 PROCEDURE — 82962 GLUCOSE BLOOD TEST: CPT

## 2022-10-21 PROCEDURE — 36415 COLL VENOUS BLD VENIPUNCTURE: CPT

## 2022-10-21 PROCEDURE — 74011250636 HC RX REV CODE- 250/636: Performed by: INTERNAL MEDICINE

## 2022-10-21 PROCEDURE — 74011250637 HC RX REV CODE- 250/637: Performed by: INTERNAL MEDICINE

## 2022-10-21 PROCEDURE — 80048 BASIC METABOLIC PNL TOTAL CA: CPT

## 2022-10-21 PROCEDURE — 74011250637 HC RX REV CODE- 250/637: Performed by: NURSE PRACTITIONER

## 2022-10-21 PROCEDURE — 83735 ASSAY OF MAGNESIUM: CPT

## 2022-10-21 RX ORDER — INSULIN GLARGINE 100 [IU]/ML
10 INJECTION, SOLUTION SUBCUTANEOUS
Status: DISCONTINUED | OUTPATIENT
Start: 2022-10-21 | End: 2022-10-24

## 2022-10-21 RX ORDER — INSULIN GLARGINE 100 [IU]/ML
13 INJECTION, SOLUTION SUBCUTANEOUS
Status: DISCONTINUED | OUTPATIENT
Start: 2022-10-22 | End: 2022-10-21

## 2022-10-21 RX ORDER — METOCLOPRAMIDE HYDROCHLORIDE 5 MG/ML
10 INJECTION INTRAMUSCULAR; INTRAVENOUS
Status: DISCONTINUED | OUTPATIENT
Start: 2022-10-21 | End: 2022-10-22

## 2022-10-21 RX ORDER — INSULIN LISPRO 100 [IU]/ML
2 INJECTION, SOLUTION INTRAVENOUS; SUBCUTANEOUS
Status: DISCONTINUED | OUTPATIENT
Start: 2022-10-21 | End: 2022-10-24

## 2022-10-21 RX ORDER — PANTOPRAZOLE SODIUM 40 MG/1
40 TABLET, DELAYED RELEASE ORAL
Status: DISCONTINUED | OUTPATIENT
Start: 2022-10-22 | End: 2022-10-21

## 2022-10-21 RX ORDER — INSULIN GLARGINE 100 [IU]/ML
12 INJECTION, SOLUTION SUBCUTANEOUS
Status: DISCONTINUED | OUTPATIENT
Start: 2022-10-22 | End: 2022-10-24

## 2022-10-21 RX ORDER — PROCHLORPERAZINE EDISYLATE 5 MG/ML
10 INJECTION INTRAMUSCULAR; INTRAVENOUS
Status: DISCONTINUED | OUTPATIENT
Start: 2022-10-21 | End: 2022-10-25 | Stop reason: HOSPADM

## 2022-10-21 RX ORDER — LIDOCAINE 4 G/100G
1 PATCH TOPICAL EVERY 24 HOURS
Status: DISCONTINUED | OUTPATIENT
Start: 2022-10-21 | End: 2022-10-25 | Stop reason: HOSPADM

## 2022-10-21 RX ORDER — PANTOPRAZOLE SODIUM 40 MG/1
40 TABLET, DELAYED RELEASE ORAL
Status: DISCONTINUED | OUTPATIENT
Start: 2022-10-21 | End: 2022-10-25 | Stop reason: HOSPADM

## 2022-10-21 RX ADMIN — HEPARIN SODIUM 5000 UNITS: 5000 INJECTION INTRAVENOUS; SUBCUTANEOUS at 14:55

## 2022-10-21 RX ADMIN — MORPHINE SULFATE 2 MG: 2 INJECTION, SOLUTION INTRAMUSCULAR; INTRAVENOUS at 17:49

## 2022-10-21 RX ADMIN — HYDRALAZINE HYDROCHLORIDE 50 MG: 50 TABLET, FILM COATED ORAL at 17:06

## 2022-10-21 RX ADMIN — MORPHINE SULFATE 2 MG: 2 INJECTION, SOLUTION INTRAMUSCULAR; INTRAVENOUS at 21:21

## 2022-10-21 RX ADMIN — SODIUM CHLORIDE, PRESERVATIVE FREE 10 ML: 5 INJECTION INTRAVENOUS at 13:44

## 2022-10-21 RX ADMIN — DEXTROSE MONOHYDRATE 250 ML: 100 INJECTION, SOLUTION INTRAVENOUS at 02:45

## 2022-10-21 RX ADMIN — FINASTERIDE 5 MG: 5 TABLET, FILM COATED ORAL at 17:06

## 2022-10-21 RX ADMIN — ONDANSETRON 4 MG: 2 INJECTION INTRAMUSCULAR; INTRAVENOUS at 08:11

## 2022-10-21 RX ADMIN — SODIUM CHLORIDE, PRESERVATIVE FREE 10 ML: 5 INJECTION INTRAVENOUS at 05:17

## 2022-10-21 RX ADMIN — PANTOPRAZOLE SODIUM 40 MG: 40 TABLET, DELAYED RELEASE ORAL at 17:08

## 2022-10-21 RX ADMIN — DULOXETINE HYDROCHLORIDE 60 MG: 30 CAPSULE, DELAYED RELEASE ORAL at 14:49

## 2022-10-21 RX ADMIN — HYDRALAZINE HYDROCHLORIDE 50 MG: 50 TABLET, FILM COATED ORAL at 21:21

## 2022-10-21 RX ADMIN — MORPHINE SULFATE 2 MG: 2 INJECTION, SOLUTION INTRAMUSCULAR; INTRAVENOUS at 08:17

## 2022-10-21 RX ADMIN — TAMSULOSIN HYDROCHLORIDE 0.4 MG: 0.4 CAPSULE ORAL at 14:47

## 2022-10-21 RX ADMIN — ONDANSETRON 4 MG: 2 INJECTION INTRAMUSCULAR; INTRAVENOUS at 13:46

## 2022-10-21 RX ADMIN — SODIUM CHLORIDE, PRESERVATIVE FREE 10 ML: 5 INJECTION INTRAVENOUS at 21:24

## 2022-10-21 RX ADMIN — MORPHINE SULFATE 2 MG: 2 INJECTION, SOLUTION INTRAMUSCULAR; INTRAVENOUS at 03:13

## 2022-10-21 RX ADMIN — AMLODIPINE BESYLATE 10 MG: 5 TABLET ORAL at 14:45

## 2022-10-21 RX ADMIN — CARVEDILOL 25 MG: 12.5 TABLET, FILM COATED ORAL at 14:48

## 2022-10-21 RX ADMIN — ASPIRIN 81 MG CHEWABLE TABLET 81 MG: 81 TABLET CHEWABLE at 14:47

## 2022-10-21 RX ADMIN — MORPHINE SULFATE 2 MG: 2 INJECTION, SOLUTION INTRAMUSCULAR; INTRAVENOUS at 13:35

## 2022-10-21 RX ADMIN — HEPARIN SODIUM 5000 UNITS: 5000 INJECTION INTRAVENOUS; SUBCUTANEOUS at 21:21

## 2022-10-21 RX ADMIN — Medication 16 G: at 02:52

## 2022-10-21 NOTE — PROGRESS NOTES
Progress note    Patient: Mike Dorsey MRN: [id number]  SSN: [id number]    YOB: 1980  Age: 46 y.o. Sex: male      Subjective: Mike Dorsey is a 46 y.o. male who is being seen for DKA admitted on 10/16/2022. Mr. Jose Barahona is a patient of Dr. Marc Peña who saw him in June 2022. Since that time he has been in the hospital for DKA four times and respiratory failure and worsening renal function three times. After his hospitalization on 9/6/22 he was started on HD on M/W/F. Pt reports similar to his last presentation for DKA that he fell asleep last night and his CGM fell off. When he woke up his BG was \"very high\" and he came to the ED. This time he says his insulin pump was functioning normally with no alarms or insulin/leakage/smell or other issue. He continues to complain of nausea and low appetite. His on admission BG was 924 and his AG was 30, K 5.5, Na 130 ( autumn 143) . Seen at bedside today but writer was told he has just fallen asleep and had been awake all night due to the hypoglycemia episode. Overnight nursing staff indicate patient continues to have very poor appetite and would drink very little of his liquid diet. He received 18 units at 9 AM yesterday and 3 units at 5 PM, at 8:30PM his BS was 89, then overnight at 2 AM it dropped to 44, he was given 4 chewable tablets and given D10% solution 250 ml, his last BS at 7 AM was 91.       Past Medical History:   Diagnosis Date    Bipolar 1 disorder, depressed ([address]     Bipolar disorder ([address]     Chronic kidney disease, stage 3a ([address]     Depression     Diabetes ([address]     DKA, type 1 ([address] 1/27/2013    diagnosed age 23    DKA, type 1 ([address]     H/O noncompliance with medical treatment, presenting hazards to health     MRSA (methicillin resistant staph aureus) culture positive     MRSA (methicillin resistant Staphylococcus aureus)     Face    Noncompliance with medication regimen     Second hand smoke exposure     Seizure ([facility]     Seizures ([facility] 2006 or 2007    one episode during [facility]     Past Surgical History:   Procedure Laterality Date    HX HEENT      top left wisdom tooth    HX ORTHOPAEDIC Left     wrist; MCV    IR INSERT NON TUNL CVC OVER 5 YRS  2022    IR INSERT TUNL CVC W/O PORT OVER 5 YR  2022    UPPER GI ENDOSCOPY,BIOPSY  2018           Family History   Problem Relation Age of Onset    Diabetes Mother     Diabetes Other         neice, type 1      Social History     Tobacco Use    Smoking status: Former     Packs/day: 0.10     Years: 16.00     Pack years: 1.60     Types: Cigarettes     Start date: 10/4/1999     Quit date: 2020     Years since quittin.6    Smokeless tobacco: Never   Substance Use Topics    Alcohol use: No      Current Facility-Administered Medications   Medication Dose Route Frequency Provider Last Rate Last Admin    insulin glargine (LANTUS) injection 18 Units  18 Units SubCUTAneous EVERY Sharee Young MD   18 Units at 10/20/22 0935    insulin glargine (LANTUS) injection 14 Units  14 Units SubCUTAneous Q MON, WED & Ivan Luna MD        insulin lispro (HUMALOG) injection 3 Units  3 Units SubCUTAneous Cari Soriano MD   3 Units at 10/20/22 1716    hydrALAZINE (APRESOLINE) tablet 50 mg  50 mg Oral TID Paulette Augustine, NP   50 mg at 10/20/22 2152    sodium chloride (NS) flush 10 mL  10 mL IntraVENous PRN Haven Love MD        insulin lispro (HUMALOG) injection   SubCUTAneous AC&HS Gwendolyn Khan MD        glucose chewable tablet 16 g  4 Tablet Oral PRN Gwendolyn Khan MD   16 g at 10/21/22 0252    glucagon (GLUCAGEN) injection 1 mg  1 mg IntraMUSCular PRN Gwendolyn Khan MD        dextrose 10% infusion 0-250 mL  0-250 mL IntraVENous PRN Gwendolyn Khan MD   250 mL at 10/21/22 0245    cloNIDine (CATAPRES) 0.1 mg/24 hr patch 1 Patch  1 Patch TransDERmal Q7D Gwendolyn Khan MD   1 Patch at 10/20/22 1500    prochlorperazine (COMPAZINE) injection 10 mg  10 mg IntraVENous Q6H PRN Juliette Sims NP   10 mg at 10/19/22 0519    carvediloL (COREG) tablet 25 mg  25 mg Oral BID WITH MEALS eBv Augustine Rosalina, NP   25 mg at 10/20/22 1624    [Held by provider] insulin regular (NOVOLIN R, HUMULIN R) 100 Units in 0.9% sodium chloride 100 mL infusion  0-50 Units/hr IntraVENous TITRATE Max Jett MD   Held at 10/20/22 0097    aspirin chewable tablet 81 mg  81 mg Oral DAILY Max Jett MD   81 mg at 10/20/22 0804    lidocaine (XYLOCAINE) 2 % viscous solution 15 mL  15 mL Mouth/Throat PRN Marsah Flores MD        benzocaine-menthoL (CEPACOL) lozenge 1 Lozenge  1 Lozenge Mucous Membrane PRN Marsha Flores MD        morphine injection 2 mg  2 mg IntraVENous Q3H PRN Max Jett MD   2 mg at 10/21/22 0313    amLODIPine (NORVASC) tablet 10 mg  10 mg Oral DAILY Max Jett MD   10 mg at 10/20/22 4920    hydrALAZINE (APRESOLINE) 20 mg/mL injection 20 mg  20 mg IntraVENous Q6H PRN Max Jett MD   20 mg at 10/20/22 0446    DULoxetine (CYMBALTA) capsule 60 mg  60 mg Oral DAILY Alton Mcqueen MD   60 mg at 10/20/22 0804    finasteride (PROSCAR) tablet 5 mg  5 mg Oral DAILY Alton Mcqueen MD   5 mg at 10/20/22 0804    tamsulosin (FLOMAX) capsule 0.4 mg  0.4 mg Oral DAILY Alton Mcqueen MD   0.4 mg at 10/20/22 0020    sodium chloride (NS) flush 5-40 mL  5-40 mL IntraVENous Q8H Alton Mcqueen MD   10 mL at 10/21/22 0517    sodium chloride (NS) flush 5-40 mL  5-40 mL IntraVENous PRN Alton Mcqueen MD   10 mL at 10/19/22 0923    acetaminophen (TYLENOL) tablet 650 mg  650 mg Oral Q6H PRN Alton Mcqueen MD   650 mg at 10/16/22 2101    Or    acetaminophen (TYLENOL) suppository 650 mg  650 mg Rectal Q6H PRN Alton Mcqueen MD        polyethylene glycol (MIRALAX) packet 17 g  17 g Oral DAILY PRN Alton Mcqueen MD        ondansetron (ZOFRAN ODT) tablet 4 mg  4 mg Oral Q8H PRN Alton Mcqueen MD        Or    ondansetron [facility]) injection 4 mg  4 mg IntraVENous Q6H PRN Dusty Padilla MD   4 mg at 10/20/22 1233    heparin (porcine) injection 5,000 Units  5,000 Units SubCUTAneous Q8H Dusty Padilla MD   5,000 Units at 10/20/22 2152    pantoprazole (PROTONIX) 40 mg in 0.9% sodium chloride 10 mL injection  40 mg IntraVENous DAILY Dusty Padilla MD   40 mg at 10/20/22 0804        No Known Allergies    Objective:     Vitals:    10/20/22 1453 10/20/22 1621 10/20/22 2308 10/21/22 0316   BP: 129/65 (!) 147/83 137/82 (!) 160/92   Pulse: 82 79 76 74   Resp: 16  17 18   Temp: 98.5 °F (36.9 °C)  98.2 °F (36.8 °C) 98 °F (36.7 °C)   TempSrc:       SpO2: 96%  97% 96%   Weight:       Height:            Recent Results (from the past 24 hour(s))   GLUCOSE, POC    Collection Time: 10/20/22  8:58 AM   Result Value Ref Range    Glucose (POC) 123 (H) 65 - 117 mg/dL    Performed by Rossi Corbett RN    GLUCOSTABILIZER    Collection Time: 10/20/22  9:03 AM   Result Value Ref Range    Glucose 123 mg/dL    Insulin order 0.0 units/hour    Insulin adminstered 0.0 units/hour    Multiplier 0.000     Low target 150 mg/dL    High target 200 mg/dL    D50 order 0.0 ml    D50 administered 0.00 ml    Minutes until next BG 60 min    Order initials TM     Administered initials TM    NUCLEAR CARDIAC STRESS TEST    Collection Time: 10/20/22 12:13 PM   Result Value Ref Range    Stress Target  bpm    Exercise Duration Time 0 min    Exercuse Duration Seconds 51 sec    Stress Systolic  mmHg    Stress Diastolic BP 89 mmHg    Stress Peak HR 87 BPM    Baseline HR 77 BPM    Stress Estimated Workload 1.0 METS    Stress Rate Pressure Product 13,920 BPM*mmHg    Stress Percent HR Achieved 48 %    Baseline Systolic  mmHg    Baseline Diastolic BP 97 mmHg    Baseline O2 Sat 100 %    Stress O2 Sat 100 %    Baseline ST Depression 0 mm    Stress ST Depression 0 mm   GLUCOSE, POC    Collection Time: 10/20/22 12:19 PM   Result Value Ref Range    Glucose (POC) 150 (H) 65 - 117 mg/dL    Performed by Dio Moore PCT    GLUCOSTABILIZER    Collection Time: 10/20/22 12:41 PM   Result Value Ref Range    Glucose 150 mg/dL    Insulin order 0.5 units/hour    Insulin adminstered 0.5 units/hour    Multiplier 0.000     Low target 150 mg/dL    High target 200 mg/dL    D50 order 0.0 ml    D50 administered 0.00 ml    Minutes until next BG 60 min    Order initials TM     Administered initials TM    GLUCOSE, POC    Collection Time: 10/20/22  3:57 PM   Result Value Ref Range    Glucose (POC) 117 65 - 117 mg/dL    Performed by Maryse Mayes RN    GLUCOSE, POC    Collection Time: 10/20/22  8:53 PM   Result Value Ref Range    Glucose (POC) 89 65 - 117 mg/dL    Performed by Phillip Richardson    MAGNESIUM    Collection Time: 10/21/22  2:10 AM   Result Value Ref Range    Magnesium 2.1 1.6 - 2.4 mg/dL   METABOLIC PANEL, BASIC    Collection Time: 10/21/22  2:10 AM   Result Value Ref Range    Sodium 134 (L) 136 - 145 mmol/L    Potassium 3.1 (L) 3.5 - 5.1 mmol/L    Chloride 98 97 - 108 mmol/L    CO2 28 21 - 32 mmol/L    Anion gap 8 5 - 15 mmol/L    Glucose 44 (LL) 65 - 100 mg/dL    BUN 21 (H) 6 - 20 MG/DL    Creatinine 4.15 (H) 0.70 - 1.30 MG/DL    BUN/Creatinine ratio 5 (L) 12 - 20      eGFR 18 (L) >60 ml/min/1.73m2    Calcium 8.0 (L) 8.5 - 10.1 MG/DL   GLUCOSE, POC    Collection Time: 10/21/22  2:29 AM   Result Value Ref Range    Glucose (POC) 48 (LL) 65 - 117 mg/dL    Performed by Phillip Richardson    GLUCOSE, POC    Collection Time: 10/21/22  3:19 AM   Result Value Ref Range    Glucose (POC) 169 (H) 65 - 117 mg/dL    Performed by Phillip Richardson    GLUCOSE, POC    Collection Time: 10/21/22  6:39 AM   Result Value Ref Range    Glucose (POC) 98 65 - 117 mg/dL    Performed by Phillip Richardson    GLUCOSE, POC    Collection Time: 10/21/22  7:22 AM   Result Value Ref Range    Glucose (POC) 91 65 - 117 mg/dL    Performed by Barbara Henry RN        Review of most recent diabetes-related labs:  Lab Results   Component Value Date    HBA1C 7.2 (H) 09/26/2022    HBA1C 8.7 (H) 08/12/2022    HBA1C 12.1 (H) 07/10/2022    OJA4HUYK 13.2 04/01/2021    YSJ5DXUD 13.7 09/25/2020    CHOL 192 11/15/2021    LDLC 89.4 11/15/2021    GFRAA 24 (L) 09/28/2022    GFRNA 20 (L) 09/28/2022    MCACR 11,439 (H) 04/01/2021    GADLT 43.0 (H) 07/14/2021    TSH 4.30 (H) 08/12/2022    VITD3 34.6 11/15/2021     Lab Key:  [id number] = IA-2 pancreatic islet cell autoantibody  CPEPL = C-peptide level  :EXT = External Lab  GADLT = COREY-65 autoantibody   INSUL = Insulin level  MCACR (or MALBEXT) = Urine Microalbumin (or External UM)  B12LT = B12 level   Assessment:     Hospital Problems  Date Reviewed: 6/21/2022            Codes Class Noted POA    D[facility], [facility] ([facility] ICD-10-CM: E10.10  ICD-9-CM: 250.13  11/18/2018 Unknown           Plan:     1) DKA >Pt was bridged with 18 units of lantus yesterday and received 3 units for dinner and developed overnight hypoglycemia, he continues to have a poor appetite. Plan is as follows to prevent recurrent hypoglycemia and until his appetite improves (changes made are underlined): On Hemodialysis days (MWF)  Lantus = 10 units daily   Humalog/Novolog = 2 units before each meal and low dose sliding scale, 1 unit for every 50 above 150  -200 take 1 extra unit  -250 take 2 extra units  -300 take 3 extra units   -350 take 4 extra units   -400 take 5 extra units  -450 take 6 extra units     On Non-hemodialysis days (TTSS)  Lantus = 12 units daily  Humalog/Novolog = 2 units before each meal and low dose sliding scale, 1 unit for every 50 above 150  -200 take 1 extra unit  -250 take 2 extra units  -300 take 3 extra units   -350 take 4 extra units   -400 take 5 extra units  -450 take 6 extra units     I will continue to monitor his BGs while in the hospital with the plan of him going home with the Lantus/Novolog  (basal-bolus) regimen.  His upcoming appt with Dr Yaritza Diamond is on 10/28/2022 and I have updated her with plan that we will discontinue his insulin pump for now. Thank you very much for the consult.        Signed By: Armen Alexander MD     October 21, 2022

## 2022-10-21 NOTE — PROGRESS NOTES
1926: Bedside and Verbal shift change report given to Rodolfo Alejo RN (oncoming nurse) by Kay Virgen RN (offgoing nurse). Report included the following information SBAR, Kardex, MAR, and Recent Results. 2100: patient complained of pain and noticed crackles on ausculation on lower left lungs. Hospitalist notified, orders received.

## 2022-10-21 NOTE — PROCEDURES
Hemodialysis / [phone]    Vitals Pre Post Assessment Pre Post   BP BP: (!) 167/103 (10/21/22 0854)   180/105 LOC A&Ox4 A&Ox4   HR Pulse (Heart Rate): 80 (10/21/22 0854) 85 Lungs clear clear   Resp Resp Rate: 18 (10/21/22 0854) 18 Cardiac RRR RRR   Temp Temp: 98.2 °F (36.8 °C) (10/21/22 0854) 97.8 Skin CDI CDI   Weight    Edema None noted None noted   Tele status   Pain Pain Intensity 1: 5 (10/21/22 0410)      Orders   Duration: Start: 0655 End: 9367 Total: 3.5hrs   Dialyzer: Dialyzer/Set Up Inspection: Reba Marks (10/21/22 0854)   K Bath: Dialysate K (mEq/L): 4 (10/21/22 0854)   Ca Bath: Dialysate CA (mEq/L): 3.0 (10/21/22 0854)   Na: Dialysate NA (mEq/L): 139 (10/21/22 0854)   Bicarb: Dialysate HCO3 (mEq/L): 38 (10/21/22 0854)   Target Fluid Removal: Goal/Amount of Fluid to Remove (mL): 0 mL (10/21/22 0854)     Access   Type & Location: R PC : Pre- Assessment: Dressing CDI. No s/s of infection. Both lumens aspirate & flush well. Running well at . Post assessment: Dressing CDI. No changes. SBAR received from Primary RN. Pt arrived to HD suite A&Ox4. Consent signed & on file OR Chronic consent applies. Each catheter limb disinfected per p&p, caps removed, hubs disinfected per p&p. Each lumen aspirated for blood return and flushed with Normal Saline per policy. Labs drawn per request/ order. VSS. Dialysis Tx initiated. Comments:   Dressing changed. No s/s of infection noted.                                      Labs   HBsAg (Antigen) / date: Neg 10/17/22                                             HBsAb (Antibody) / date: Susc 10/17/22   Source: EPIC   Obtained/Reviewed  Critical Results Called HGB   Date Value Ref Range Status   10/20/2022 11.1 (L) 12.1 - 17.0 g/dL Final     Potassium   Date Value Ref Range Status   10/21/2022 3.1 (L) 3.5 - 5.1 mmol/L Final     Calcium   Date Value Ref Range Status   10/21/2022 8.0 (L) 8.5 - 10.1 MG/DL Final     BUN   Date Value Ref Range Status   10/21/2022 21 (H) 6 - 20 MG/DL Final     Creatinine   Date Value Ref Range Status   10/21/2022 4.15 (H) 0.70 - 1.30 MG/DL Final     Comment:     INVESTIGATED PER DELTA CHECK PROTOCOL        Meds Given   Name Dose Route   Heparin 1:1000 1.8 [email or web address]   Heparin 1:1000 1.8 [email or web address]          Adequacy / Fluid    Total Liters Process: 76.0   Net Fluid Removed: 0      Comments   Time Out Done:   (Time) 7100   Admitting Diagnosis: DKA, Type 1   Consent obtained/signed: Informed Consent Verified: Yes (10/21/22 3949)   Machine / [place] # Machine Number: [id number] (10/21/22 3976)   Primary Nurse Rpt Pre: Esha Rivera RN   Primary Nurse Rpt Post: sEha Rivera RN   Pt Education: procedural   Care Plan: On going   Pts outpatient clinic: [facility]     Tx Summary  2725:  R PC : Pre- Assessment: Dressing CDI. No s/s of infection. Both lumens aspirate & flush well. Running well at . Post assessment: Dressing CDI. No changes. SBAR received from Primary RN. Pt arrived to HD suite A&Ox4. Consent signed & on file OR Chronic consent applies. Each catheter limb disinfected per p&p, caps removed, hubs disinfected per p&p. Each lumen aspirated for blood return and flushed with Normal Saline per policy. Labs drawn per request/ order. VSS. Dialysis Tx initiated. **Pt tolerated tx well. No issues or complaints voiced. **    1229: Tx ended. VSS. Each dialysis catheter limb disinfected per p&p, all possible blood returned per p&p, and each dialysis hub disinfected per p&p. Each lumen flushed, post dialysis catheter Heparin dwell instilled per order, and caps applied. Bed locked and in the lowest position, call bell and belongings in reach. SBAR given to Primary, RN. Patient is stable at time of their/ my departure. All Dialysis related medications have been reviewed. Comments:   Assessment performed by RN. Procedure and documentation observed and reviewed by Myrtle Waddell RN.

## 2022-10-21 NOTE — PROGRESS NOTES
Nephrology Progress Note  [facility] / [address], [place], [address]  Phone - [phone]  Fax - [phone]                 Patient: Yesy Javed                   YOB: 1980        Date- 10/21/2022                      Admit Date: 10/16/2022  CC: Follow up for ESRD        IMPRESSION & PLAN:   ESRD- hd mwf at [facility]  Hyponatremia  Hyperkalemia  DKA  H/o Urinary retention requiring hansen cath  Type 1 diabetes  Hypertension  Anemia  Elevated troponins- s/p stress test      PLAN-    Continue norvasc, coreg  Continue hydralazine  Okay to add acei or arb  Continue hd mwf schedule. Hold epogen     Subjective: Interval History:   -he is back on insulin gtt  Bp high-  K low      Objective:   Vitals:    10/21/22 1000 10/21/22 1015 10/21/22 1030 10/21/22 1045   BP: (!) 160/94 (!) 189/104 (!) 172/101 (!) 174/101   Pulse: 79 79 78 80   Resp: 18 18 18 18   Temp:       TempSrc:       SpO2:       Weight:       Height:          10/20 0701 - 10/21 0700  In: 240 [P.O.:240]  Out: 200 [Urine:200]  Last 3 Recorded Weights in this Encounter    10/16/22 1244 10/19/22 0919   Weight: 76.7 kg (169 lb 3.2 oz) 72.7 kg (160 lb 4.4 oz)        Physical exam:    GEN:  NAD  NECK:  Supple, no thyromegaly  RESP: Clear  b/l, no  wheezing,   CVS: RRR,S1,S2   NEURO: non focal, normal speech  PERMACATH +  Chart reviewed. Pertinent Notes reviewed.      Data Review :  Recent Labs     10/21/22  0210 10/20/22  0426 10/19/22  2211 10/19/22  0646 10/19/22  0315 10/18/22  1508 10/18/22  1050   * 130* 135*   < > 127*   < > 130*   K 3.1* 3.5 3.6   < > 3.4*   < > 3.6   CL 98 96* 98   < > 91*   < > 92*   CO2 28 25 28   < > 21   < > 18*   BUN 21* 18 17   < > 47*   < > 44*   CREA 4.15* 3.24* 2.87*   < > 4.91*   < > 4.26*   GLU 44* 228* 135*   < > 204*   < > 255*   CA 8.0* 7.4* 7.6*   < > 7.6*   < > 7.8*   MG 2.1 2.0 2.0   < > 2.4 < > 2.3   PHOS  --  4.0  --   --  6.7*  --  6.4*    < > = values in this interval not displayed. Recent Labs     10/20/22  0426 10/19/22  0315   WBC 4.5 7.7   HGB 11.1* 10.8*   HCT 35.9* 33.6*    257       No results for input(s): FE, TIBC, PSAT, FERR in the last 72 hours.    Lab Results   Component Value Date/Time    Hemoglobin A1c 7.2 (H) 09/26/2022 09:34 AM    Hemoglobin A1c 8.7 (H) 08/12/2022 12:41 AM    Hemoglobin A1c 12.1 (H) 07/10/2022 07:51 PM        Lab Results   Component Value Date/Time    Microalbumin/Creat ratio (mg/g creat) 11,439 (H) 04/01/2021 10:00 AM    Microalbumin,urine random 151.00 04/01/2021 10:00 AM     Lab Results   Component Value Date/Time    NT pro-BNP 5,688 (H) 09/06/2022 09:16 AM    NT pro-BNP 11,477 (H) 08/16/2022 12:50 AM    NT pro-BNP 4,600 (H) 08/09/2022 02:13 PM    NT pro- (H) 07/06/2021 09:52 AM     US Results (most recent):  Medication list  reviewed  Current Facility-Administered Medications   Medication Dose Route Frequency    insulin glargine (LANTUS) injection 10 Units  10 Units SubCUTAneous Q MON, WED & FRI    insulin lispro (HUMALOG) injection 2 Units  2 Units SubCUTAneous TIDAC    [START ON 10/22/2022] insulin glargine (LANTUS) injection 12 Units  12 Units SubCUTAneous 4 TIMES/WEEK (T,TH,SA,BRAVO)    hydrALAZINE (APRESOLINE) tablet 50 mg  50 mg Oral TID    sodium chloride (NS) flush 10 mL  10 mL IntraVENous PRN    insulin lispro (HUMALOG) injection   SubCUTAneous AC&HS    glucose chewable tablet 16 g  4 Tablet Oral PRN    glucagon (GLUCAGEN) injection 1 mg  1 mg IntraMUSCular PRN    dextrose 10% infusion 0-250 mL  0-250 mL IntraVENous PRN    cloNIDine (CATAPRES) 0.1 mg/24 hr patch 1 Patch  1 Patch TransDERmal Q7D    prochlorperazine (COMPAZINE) injection 10 mg  10 mg IntraVENous Q6H PRN    carvediloL (COREG) tablet 25 mg  25 mg Oral BID WITH MEALS    [Held by provider] insulin regular (NOVOLIN R, HUMULIN R) 100 Units in 0.9% sodium chloride 100 mL infusion  0-50 Units/hr IntraVENous TITRATE    aspirin chewable tablet 81 mg  81 mg Oral DAILY    lidocaine (XYLOCAINE) 2 % viscous solution 15 mL  15 mL Mouth/Throat PRN    benzocaine-menthoL (CEPACOL) lozenge 1 Lozenge  1 Lozenge Mucous Membrane PRN    morphine injection 2 mg  2 mg IntraVENous Q3H PRN    amLODIPine (NORVASC) tablet 10 mg  10 mg Oral DAILY    hydrALAZINE (APRESOLINE) 20 mg/mL injection 20 mg  20 mg IntraVENous Q6H PRN    DULoxetine (CYMBALTA) capsule 60 mg  60 mg Oral DAILY    finasteride (PROSCAR) tablet 5 mg  5 mg Oral DAILY    tamsulosin (FLOMAX) capsule 0.4 mg  0.4 mg Oral DAILY    sodium chloride (NS) flush 5-40 mL  5-40 mL IntraVENous Q8H    sodium chloride (NS) flush 5-40 mL  5-40 mL IntraVENous PRN    acetaminophen (TYLENOL) tablet 650 mg  650 mg Oral Q6H PRN    Or    acetaminophen (TYLENOL) suppository 650 mg  650 mg Rectal Q6H PRN    polyethylene glycol (MIRALAX) packet 17 g  17 g Oral DAILY PRN    ondansetron (ZOFRAN ODT) tablet 4 mg  4 mg Oral Q8H PRN    Or    ondansetron (ZOFRAN) injection 4 mg  4 mg IntraVENous Q6H PRN    heparin (porcine) injection 5,000 Units  5,000 Units SubCUTAneous Q8H    pantoprazole (PROTONIX) 40 mg in 0.9% sodium chloride 10 mL injection  40 mg IntraVENous DAILY        Shelli Urban MD  10/21/2022

## 2022-10-21 NOTE — PROGRESS NOTES
Called to attempt psychiatric consult over telehealth, pt off the floor on dialysis, was advised pt will return around 2pm. Left my number for staff to call me back when he has returned so I can complete consult later this afternoon.

## 2022-10-21 NOTE — PROGRESS NOTES
Hospitalist Progress Note    NAME: Allison Diaz   :  1980   MRN:  [id number]     Admit date: 10/16/2022    Today's date: 10/21/22    PCP: Sabrina Jimenez MD      Anticipated discharge date: 10/24    Barriers:  Hypoglycemia    Assessment / Plan:    DKA POA Admitted with , HCO3 12 with AG 30  Insulin dependent DM type 1 POA on home insulin pump  Recent DKA from pump malfunction 2022  Admit started on insulin drip per Glucostabilizer protocol              Glycemic control improved and AG normalized  Insulin gtt resumed, currently on hold  ? Trigger sepsis  Diabetic teaching  Last A1c= 7.2  Po diet as tolerated - diabetic restrictions  Endocrine consulted, plan to transitioned to subQ insulin rather than pump, recs appreciated and ordered. Insulin adjusted as patient still not eating due to nausea and has been having hypoglycemia  ADA diet  Will need to follow up as outpatient to determine if going back on pump is best for patient    Elevated troponin 17 --> 137 --> 133  Setting of DKA and recurrent N/V  At risk for underlying CAD  ASA  Tele, serial labs  Last echo 2022 with LVEF 55-60%, normal wall motion   Normal RV function  Similar bump with DKA admit 2022[facility]  Underwent stress test on 10/20 which showed No ischemia or infarct demonstrated. LVEF 51%  Nuclear stress test is negative for ischemia preserved ejection fraction. Sepsis POA WBC 14.8, 106, lactate 1.6, procalcitonin 12.51  Possible UTI POA  CXR with no ASD or edema  CT abdomen/pelvis IMPRESSION  1. Questionable mild diffuse colitis. 2. Moderate distal esophagitis. 3. Trace pelvic free fluid. 4. Moderate diffuse bladder wall thickening.    NS unable to get UA till today, was ordered since admit  UA 80 mg% ketones, 5 to 10 WBC, 5 to 10 RBCs, negative bacteria  blood and urine cultures NGTD  DC empiric ceftriaxone and vancomycin     Recurrent N/V POA  Generalized abdominal pain POA  ? Related to DKA, DKA resolved, but persistent   ? Lagging improvement in the DKA  CT abdomen/pelvis IMPRESSION  1. Questionable mild diffuse colitis. 2. Moderate distal esophagitis. 3. Trace pelvic free fluid. 4. Moderate diffuse bladder wall thickening. Normal lipase  Suspect related to DKA     ESRD POA  Recently started on HD, MWF on 9/7/2022  Nephrology consulted, c/w scheduled HD    Severe esophageal/gastric inflammation POA  Seen on previous EGD  IV PPI     Essential HTN POA  C/w Amlodipine, c/w clonidine patch  Cont coreg  Hydralazine added     Recent urinary retention from prior admit POA hansen removed  Hansen in placed 1 month in august/September 2022  Urology concerned for ?? BPH and atonic bladder per consult  Removed last admit several weeks ago, follows [facility]  Still having some retention, may need another hansen. Has been getting straight cathed    Depression  Patient admits to being depressed  Will consult psychiatry    Overweight POA Body mass index is 24.37 kg/m². Code status: Full  Prophylaxis: Hep SQ  Recommended Disposition: Home w/Family    Subjective:     Chief Complaint / Reason for Physician Visit  Patient seen and examined at bedside. Just got back from stress test and was having some chest discomfort. He is no longer having nausea / vomiting. Says he feels well. He is also having recurrent urinary retention. Review of Systems:  Symptom Y/N Comments  Symptom Y/N Comments   Fever/Chills n   Chest Pain n    Poor Appetite    Edema     Cough n   Abdominal Pain y    Sputum    Joint Pain     SOB/JOHNSTON n   Headache     Nausea/vomit y   Tolerating PT/OT     Diarrhea n   Tolerating Diet n    Constipation    Other       Could NOT obtain due to:      Objective:     VITALS:   Last 24hrs VS reviewed since prior progress note.  Most recent are:  Patient Vitals for the past 24 hrs:   Temp Pulse Resp BP SpO2   10/21/22 1445 -- 89 -- (!) 186/117 --   10/21/22 1336 -- 85 -- (!) 200/98 --   10/21/22 1329 -- 85 -- (!) 207/107 97 %   10/21/22 1229 97.8 °F (36.6 °C) 85 18 (!) 180/105 --   10/21/22 1215 -- 83 18 (!) 174/98 --   10/21/22 1200 -- 82 18 (!) 181/99 --   10/21/22 1145 -- 81 18 (!) 178/93 --   10/21/22 1130 -- 81 18 (!) 178/95 --   10/21/22 1115 -- 83 18 (!) 184/97 --   10/21/22 1100 -- 86 18 (!) 164/105 --   10/21/22 1045 -- 80 18 (!) 174/101 --   10/21/22 1030 -- 78 18 (!) 172/101 --   10/21/22 1015 -- 79 18 (!) 189/104 --   10/21/22 1000 -- 79 18 (!) 160/94 --   10/21/22 0945 -- 78 18 (!) 167/98 --   10/21/22 0930 -- 72 18 (!) 166/103 --   10/21/22 0915 -- 84 18 (!) 153/96 --   10/21/22 0900 -- 79 18 (!) 178/109 --   10/21/22 0854 98.2 °F (36.8 °C) 80 18 (!) 167/103 --   10/21/22 0821 97.7 °F (36.5 °C) 77 16 (!) 154/82 97 %   10/21/22 0316 98 °F (36.7 °C) 74 18 (!) 160/92 96 %   10/20/22 2308 98.2 °F (36.8 °C) 76 17 137/82 97 %         Intake/Output Summary (Last 24 hours) at 10/21/2022 1653  Last data filed at 10/21/2022 1500  Gross per 24 hour   Intake 420 ml   Output 200 ml   Net 220 ml          Wt Readings from Last 12 Encounters:   10/19/22 72.7 kg (160 lb 4.4 oz)   09/28/22 75.8 kg (167 lb 3.2 oz)   09/13/22 72.1 kg (159 lb)   09/06/22 81.6 kg (180 lb)   08/25/22 83.2 kg (183 lb 6.4 oz)   08/17/22 91.4 kg (201 lb 8 oz)   08/09/22 82.7 kg (182 lb 5.1 oz)   07/14/22 73.9 kg (163 lb)   07/10/22 63.5 kg (140 lb)   06/25/22 71.6 kg (157 lb 13.6 oz)   06/21/22 66.5 kg (146 lb 9.6 oz)   06/07/22 72.6 kg (160 lb)       PHYSICAL EXAM:    I had a face to face encounter and independently examined this patient on 10/21/22 as outlined below:    General: WD, WN. Alert, cooperative, actively vomiting  EENT:  PERRL. Anicteric sclerae. MMM  Neck:  No meningismus, no thyromegaly  Resp:  CTA bilaterally, no wheezing or rales. No accessory muscle use  CV:  Regular  rhythm,  No edema  GI:  Soft, Non distended, generalized tender.   +Bowel sounds, no rebound  Neurologic:  Alert and oriented X 3, normal speech, non focal motor exam  Psych:   Not anxious nor agitated  Skin:  No rashes. No jaundice    Reviewed most current lab test results and cultures  YES  Reviewed most current radiology test results   YES  Review and summation of old records today    NO  Reviewed patient's current orders and MAR    YES  PMH/SH reviewed - no change compared to H&P  ________________________________________________________________________  Care Plan discussed with:    Comments   Patient x    Family      RN x    Care Manager     Consultant                        Multidiciplinary team rounds were held today with , nursing, pharmacist and clinical coordinator. Patient's plan of care was discussed; medications were reviewed and discharge planning was addressed. ________________________________________________________________________      Comments   >50% of visit spent in counseling and coordination of care     ________________________________________________________________________  Hardik Cartagena MD     Procedures: see electronic medical records for all procedures/Xrays and details which were not copied into this note but were reviewed prior to creation of Plan. LABS:  I reviewed today's most current labs and imaging studies. Pertinent labs include:  Recent Labs     10/20/22  0426 10/19/22  0315   WBC 4.5 7.7   HGB 11.1* 10.8*   HCT 35.9* 33.6*    257       Recent Labs     10/21/22  0210 10/20/22  0426 10/19/22  2211 10/19/22  0646 10/19/22  0315   * 130* 135*   < > 127*   K 3.1* 3.5 3.6   < > 3.4*   CL 98 96* 98   < > 91*   CO2 28 25 28   < > 21   GLU 44* 228* 135*   < > 204*   BUN 21* 18 17   < > 47*   CREA 4.15* 3.24* 2.87*   < > 4.91*   CA 8.0* 7.4* 7.6*   < > 7.6*   MG 2.1 2.0 2.0   < > 2.4   PHOS  --  4.0  --   --  6.7*    < > = values in this interval not displayed.

## 2022-10-21 NOTE — PROGRESS NOTES
1320- Pt returned tp room 2266 s/p HD(Dialysis). 1336- /98. Pt has not had Blood pressure meds today due to dialysis. C/o pin. Morphine 2 mg iv given. And pt c/o nausea  1346- zofran given for nausea. 1353-accuchek- bg-69. Encouraged pt to eat food. Lunch tray was ordered and is at bedside but pt refuses to eat. 1424-BG- 62.  1435- apple juice given and drank 100 ml    1441- bg-56. BP meds given with apple juice. Except hydralazine pt could not take said starting to get nausea. And discussed with Patient that I will need to give him iv glucose if BG continues to drop. Pt does not want to have BG.      1500- Dr Lobo Hartmann in room updating patient. Stated to call him if BG continues to be low and he would restart D5W.  1535- report given to Noah Irvin , notified that BG is due now and [facility] order on chart. Dr Lobo Hartmann to be made aware of low BG. Also new Nausea and pain orders.

## 2022-10-22 ENCOUNTER — APPOINTMENT (OUTPATIENT)
Dept: GENERAL RADIOLOGY | Age: 42
DRG: 420 | End: 2022-10-22
Attending: NURSE PRACTITIONER
Payer: MEDICAID

## 2022-10-22 LAB
ANION GAP SERPL CALC-SCNC: 5 MMOL/L (ref 5–15)
BASOPHILS # BLD: 0 K/UL (ref 0–0.1)
BASOPHILS NFR BLD: 1 % (ref 0–1)
BNP SERPL-MCNC: 3665 PG/ML
BUN SERPL-MCNC: 10 MG/DL (ref 6–20)
BUN/CREAT SERPL: 3 (ref 12–20)
CALCIUM SERPL-MCNC: 8.3 MG/DL (ref 8.5–10.1)
CHLORIDE SERPL-SCNC: 101 MMOL/L (ref 97–108)
CO2 SERPL-SCNC: 30 MMOL/L (ref 21–32)
CREAT SERPL-MCNC: 3 MG/DL (ref 0.7–1.3)
DIFFERENTIAL METHOD BLD: ABNORMAL
EOSINOPHIL # BLD: 0.3 K/UL (ref 0–0.4)
EOSINOPHIL NFR BLD: 6 % (ref 0–7)
ERYTHROCYTE [DISTWIDTH] IN BLOOD BY AUTOMATED COUNT: 18.3 % (ref 11.5–14.5)
GLUCOSE BLD STRIP.AUTO-MCNC: 100 MG/DL (ref 65–117)
GLUCOSE BLD STRIP.AUTO-MCNC: 101 MG/DL (ref 65–117)
GLUCOSE BLD STRIP.AUTO-MCNC: 106 MG/DL (ref 65–117)
GLUCOSE BLD STRIP.AUTO-MCNC: 108 MG/DL (ref 65–117)
GLUCOSE BLD STRIP.AUTO-MCNC: 69 MG/DL (ref 65–117)
GLUCOSE SERPL-MCNC: 83 MG/DL (ref 65–100)
HCT VFR BLD AUTO: 41 % (ref 36.6–50.3)
HGB BLD-MCNC: 12.4 G/DL (ref 12.1–17)
IMM GRANULOCYTES # BLD AUTO: 0 K/UL (ref 0–0.04)
IMM GRANULOCYTES NFR BLD AUTO: 0 % (ref 0–0.5)
LYMPHOCYTES # BLD: 1.3 K/UL (ref 0.8–3.5)
LYMPHOCYTES NFR BLD: 29 % (ref 12–49)
MAGNESIUM SERPL-MCNC: 2 MG/DL (ref 1.6–2.4)
MCH RBC QN AUTO: 25.7 PG (ref 26–34)
MCHC RBC AUTO-ENTMCNC: 30.2 G/DL (ref 30–36.5)
MCV RBC AUTO: 85.1 FL (ref 80–99)
MONOCYTES # BLD: 0.6 K/UL (ref 0–1)
MONOCYTES NFR BLD: 13 % (ref 5–13)
NEUTS SEG # BLD: 2.2 K/UL (ref 1.8–8)
NEUTS SEG NFR BLD: 51 % (ref 32–75)
NRBC # BLD: 0 K/UL (ref 0–0.01)
NRBC BLD-RTO: 0 PER 100 WBC
PHOSPHATE SERPL-MCNC: 2.9 MG/DL (ref 2.6–4.7)
PLATELET # BLD AUTO: 213 K/UL (ref 150–400)
PMV BLD AUTO: 10.4 FL (ref 8.9–12.9)
POTASSIUM SERPL-SCNC: 3.5 MMOL/L (ref 3.5–5.1)
RBC # BLD AUTO: 4.82 M/UL (ref 4.1–5.7)
SERVICE CMNT-IMP: NORMAL
SODIUM SERPL-SCNC: 136 MMOL/L (ref 136–145)
WBC # BLD AUTO: 4.4 K/UL (ref 4.1–11.1)

## 2022-10-22 PROCEDURE — 74011250637 HC RX REV CODE- 250/637: Performed by: NURSE PRACTITIONER

## 2022-10-22 PROCEDURE — 74011250637 HC RX REV CODE- 250/637: Performed by: INTERNAL MEDICINE

## 2022-10-22 PROCEDURE — 74011000250 HC RX REV CODE- 250: Performed by: INTERNAL MEDICINE

## 2022-10-22 PROCEDURE — 83735 ASSAY OF MAGNESIUM: CPT

## 2022-10-22 PROCEDURE — 85025 COMPLETE CBC W/AUTO DIFF WBC: CPT

## 2022-10-22 PROCEDURE — 80048 BASIC METABOLIC PNL TOTAL CA: CPT

## 2022-10-22 PROCEDURE — 84100 ASSAY OF PHOSPHORUS: CPT

## 2022-10-22 PROCEDURE — 65270000046 HC RM TELEMETRY

## 2022-10-22 PROCEDURE — 74011250636 HC RX REV CODE- 250/636: Performed by: INTERNAL MEDICINE

## 2022-10-22 PROCEDURE — 36415 COLL VENOUS BLD VENIPUNCTURE: CPT

## 2022-10-22 PROCEDURE — 71045 X-RAY EXAM CHEST 1 VIEW: CPT

## 2022-10-22 PROCEDURE — 82962 GLUCOSE BLOOD TEST: CPT

## 2022-10-22 PROCEDURE — 74011636637 HC RX REV CODE- 636/637: Performed by: GENERAL ACUTE CARE HOSPITAL

## 2022-10-22 PROCEDURE — 83880 ASSAY OF NATRIURETIC PEPTIDE: CPT

## 2022-10-22 RX ORDER — METOCLOPRAMIDE HYDROCHLORIDE 5 MG/ML
5 INJECTION INTRAMUSCULAR; INTRAVENOUS
Status: DISCONTINUED | OUTPATIENT
Start: 2022-10-22 | End: 2022-10-25 | Stop reason: HOSPADM

## 2022-10-22 RX ADMIN — MORPHINE SULFATE 2 MG: 2 INJECTION, SOLUTION INTRAMUSCULAR; INTRAVENOUS at 01:59

## 2022-10-22 RX ADMIN — CARVEDILOL 25 MG: 12.5 TABLET, FILM COATED ORAL at 09:04

## 2022-10-22 RX ADMIN — SODIUM CHLORIDE, PRESERVATIVE FREE 10 ML: 5 INJECTION INTRAVENOUS at 05:29

## 2022-10-22 RX ADMIN — ASPIRIN 81 MG CHEWABLE TABLET 81 MG: 81 TABLET CHEWABLE at 09:04

## 2022-10-22 RX ADMIN — SODIUM CHLORIDE, PRESERVATIVE FREE 10 ML: 5 INJECTION INTRAVENOUS at 12:10

## 2022-10-22 RX ADMIN — METOCLOPRAMIDE 10 MG: 5 INJECTION, SOLUTION INTRAMUSCULAR; INTRAVENOUS at 08:03

## 2022-10-22 RX ADMIN — FINASTERIDE 5 MG: 5 TABLET, FILM COATED ORAL at 09:05

## 2022-10-22 RX ADMIN — INSULIN GLARGINE 12 UNITS: 100 INJECTION, SOLUTION SUBCUTANEOUS at 09:08

## 2022-10-22 RX ADMIN — DULOXETINE HYDROCHLORIDE 60 MG: 30 CAPSULE, DELAYED RELEASE ORAL at 09:05

## 2022-10-22 RX ADMIN — AMLODIPINE BESYLATE 10 MG: 5 TABLET ORAL at 09:05

## 2022-10-22 RX ADMIN — MORPHINE SULFATE 2 MG: 2 INJECTION, SOLUTION INTRAMUSCULAR; INTRAVENOUS at 21:26

## 2022-10-22 RX ADMIN — HYDRALAZINE HYDROCHLORIDE 50 MG: 50 TABLET, FILM COATED ORAL at 17:52

## 2022-10-22 RX ADMIN — HEPARIN SODIUM 5000 UNITS: 5000 INJECTION INTRAVENOUS; SUBCUTANEOUS at 05:29

## 2022-10-22 RX ADMIN — MORPHINE SULFATE 2 MG: 2 INJECTION, SOLUTION INTRAMUSCULAR; INTRAVENOUS at 12:10

## 2022-10-22 RX ADMIN — TAMSULOSIN HYDROCHLORIDE 0.4 MG: 0.4 CAPSULE ORAL at 09:05

## 2022-10-22 RX ADMIN — Medication 2 UNITS: at 17:53

## 2022-10-22 RX ADMIN — SODIUM CHLORIDE, PRESERVATIVE FREE 10 ML: 5 INJECTION INTRAVENOUS at 14:28

## 2022-10-22 RX ADMIN — HEPARIN SODIUM 5000 UNITS: 5000 INJECTION INTRAVENOUS; SUBCUTANEOUS at 14:27

## 2022-10-22 RX ADMIN — PANTOPRAZOLE SODIUM 40 MG: 40 TABLET, DELAYED RELEASE ORAL at 07:40

## 2022-10-22 RX ADMIN — HEPARIN SODIUM 5000 UNITS: 5000 INJECTION INTRAVENOUS; SUBCUTANEOUS at 21:27

## 2022-10-22 RX ADMIN — Medication 16 G: at 09:10

## 2022-10-22 RX ADMIN — HYDRALAZINE HYDROCHLORIDE 50 MG: 50 TABLET, FILM COATED ORAL at 21:27

## 2022-10-22 RX ADMIN — Medication 2 UNITS: at 12:13

## 2022-10-22 RX ADMIN — MORPHINE SULFATE 2 MG: 2 INJECTION, SOLUTION INTRAMUSCULAR; INTRAVENOUS at 18:04

## 2022-10-22 RX ADMIN — CARVEDILOL 25 MG: 12.5 TABLET, FILM COATED ORAL at 17:52

## 2022-10-22 RX ADMIN — HYDRALAZINE HYDROCHLORIDE 50 MG: 50 TABLET, FILM COATED ORAL at 09:05

## 2022-10-22 RX ADMIN — SODIUM CHLORIDE, PRESERVATIVE FREE 10 ML: 5 INJECTION INTRAVENOUS at 21:27

## 2022-10-22 RX ADMIN — MORPHINE SULFATE 2 MG: 2 INJECTION, SOLUTION INTRAMUSCULAR; INTRAVENOUS at 08:07

## 2022-10-22 NOTE — PROGRESS NOTES
Progress Note    Assessment:     Active Problems:    DKA, type 1 ([address] (11/18/2018)        ESRD- hd mwf at [facility]  Hyponatremia  Hyperkalemia  DKA  H/o Urinary retention requiring hansen cath  Type 1 diabetes  Hypertension  Anemia  Elevated troponins- s/p stress test        Stable from our view- chems ok  FUNMILAYO on hold given hct  May need serna eval left pleuritic CP   Back monday         Plan:          Time spent with patient during dialysis no      Subjective:       Complaint:  Left pleuritc cp. Also some nv .  NO prolonged immobilization PTA      Current Facility-Administered Medications   Medication Dose Route Frequency    insulin glargine (LANTUS) injection 10 Units  10 Units SubCUTAneous Q MON, WED & FRI    insulin lispro (HUMALOG) injection 2 Units  2 Units SubCUTAneous TIDAC    insulin glargine (LANTUS) injection 12 Units  12 Units SubCUTAneous 4 TIMES/WEEK (T,TH,SA,BRAVO)    metoclopramide HCl (REGLAN) injection 10 mg  10 mg IntraVENous Q6H PRN    lidocaine 4 % patch 1 Patch  1 Patch TransDERmal Q24H    prochlorperazine (COMPAZINE) injection 10 mg  10 mg IntraVENous Q6H PRN    pantoprazole (PROTONIX) tablet 40 mg  40 mg Oral ACB    hydrALAZINE (APRESOLINE) tablet 50 mg  50 mg Oral TID    sodium chloride (NS) flush 10 mL  10 mL IntraVENous PRN    insulin lispro (HUMALOG) injection   SubCUTAneous AC&HS    glucose chewable tablet 16 g  4 Tablet Oral PRN    glucagon (GLUCAGEN) injection 1 mg  1 mg IntraMUSCular PRN    dextrose 10% infusion 0-250 mL  0-250 mL IntraVENous PRN    cloNIDine (CATAPRES) 0.1 mg/24 hr patch 1 Patch  1 Patch TransDERmal Q7D    carvediloL (COREG) tablet 25 mg  25 mg Oral BID WITH MEALS    aspirin chewable tablet 81 mg  81 mg Oral DAILY    lidocaine (XYLOCAINE) 2 % viscous solution 15 mL  15 mL Mouth/Throat PRN    benzocaine-menthoL (CEPACOL) lozenge 1 Lozenge  1 Lozenge Mucous Membrane PRN    morphine injection 2 mg  2 mg IntraVENous Q3H PRN    amLODIPine (NORVASC) tablet 10 mg  10 mg Oral DAILY    hydrALAZINE (APRESOLINE) 20 mg/mL injection 20 mg  20 mg IntraVENous Q6H PRN    DULoxetine (CYMBALTA) capsule 60 mg  60 mg Oral DAILY    finasteride (PROSCAR) tablet 5 mg  5 mg Oral DAILY    tamsulosin (FLOMAX) capsule 0.4 mg  0.4 mg Oral DAILY    sodium chloride (NS) flush 5-40 mL  5-40 mL IntraVENous Q8H    sodium chloride (NS) flush 5-40 mL  5-40 mL IntraVENous PRN    acetaminophen (TYLENOL) tablet 650 mg  650 mg Oral Q6H PRN    Or    acetaminophen (TYLENOL) suppository 650 mg  650 mg Rectal Q6H PRN    polyethylene glycol (MIRALAX) packet 17 g  17 g Oral DAILY PRN    heparin (porcine) injection 5,000 Units  5,000 Units SubCUTAneous Q8H       Review of Systems  Pertinent items are noted in HPI. Objective:     Visit Vitals  BP (!) 153/106   Pulse 82   Temp 98.1 °F (36.7 °C)   Resp 18   Ht 5' 8\" (1.727 m)   Wt 72.7 kg (160 lb 4.4 oz)   SpO2 92%   BMI 24.37 kg/m²     Temp (24hrs), Av.2 °F (36.8 °C), Min:97.8 °F (36.6 °C), Max:98.6 °F (37 °C)      No intake/output data recorded.     Physical Exam:    General [    ] healthy     [x] acutely ill [    ] chronically ill   [    ] critical      Skin  [x] Nl color/turgor [    ]   Eyes  [x] EOMI [    ]    ENT  [x] clear/moist [    ]     Neck  [x] supple  [    ]    Pulm  [] clear to A/P [    ]    CV  [] RRR  [    ]   ABD  [] Soft  [    ]      [] Beard  [    ]    MS  [] Edema  [    ]     Neuro             [x] nonfocal  [    ]    Psyche           [x] anxious   [    ]       Data Review:     LABS:   Recent Results (from the past 24 hour(s))   GLUCOSE, POC    Collection Time: 10/21/22  1:53 PM   Result Value Ref Range    Glucose (POC) 69 65 - 117 mg/dL    Performed by Dulce Marroquin PCT    GLUCOSE, POC    Collection Time: 10/21/22  2:24 PM   Result Value Ref Range    Glucose (POC) 62 (L) 65 - 117 mg/dL    Performed by Dulce Marroquin PCT    GLUCOSE, POC    Collection Time: 10/21/22  2:41 PM   Result Value Ref Range    Glucose (POC) 56 (L) 65 - 117 mg/dL Performed by Lannis Laud PCT    GLUCOSE, POC    Collection Time: 10/21/22  3:10 PM   Result Value Ref Range    Glucose (POC) 74 65 - 117 mg/dL    Performed by Lannis Laud PCT    GLUCOSE, POC    Collection Time: 10/21/22  4:34 PM   Result Value Ref Range    Glucose (POC) 95 65 - 117 mg/dL    Performed by Lannis Laud PCT    GLUCOSE, POC    Collection Time: 10/21/22  8:36 PM   Result Value Ref Range    Glucose (POC) 121 (H) 65 - 117 mg/dL    Performed by Montrell Estrada    METABOLIC PANEL, BASIC    Collection Time: 10/22/22  2:12 AM   Result Value Ref Range    Sodium 136 136 - 145 mmol/L    Potassium 3.5 3.5 - 5.1 mmol/L    Chloride 101 97 - 108 mmol/L    CO2 30 21 - 32 mmol/L    Anion gap 5 5 - 15 mmol/L    Glucose 83 65 - 100 mg/dL    BUN 10 6 - 20 MG/DL    Creatinine 3.00 (H) 0.70 - 1.30 MG/DL    BUN/Creatinine ratio 3 (L) 12 - 20      eGFR 26 (L) >60 ml/min/1.73m2    Calcium 8.3 (L) 8.5 - 10.1 MG/DL   MAGNESIUM    Collection Time: 10/22/22  2:12 AM   Result Value Ref Range    Magnesium 2.0 1.6 - 2.4 mg/dL   PHOSPHORUS    Collection Time: 10/22/22  2:12 AM   Result Value Ref Range    Phosphorus 2.9 2.6 - 4.7 MG/DL   CBC WITH AUTOMATED DIFF    Collection Time: 10/22/22  2:12 AM   Result Value Ref Range    WBC 4.4 4.1 - 11.1 K/uL    RBC 4.82 4.10 - 5.70 M/uL    HGB 12.4 12.1 - 17.0 g/dL    HCT 41.0 36.6 - 50.3 %    MCV 85.1 80.0 - 99.0 FL    MCH 25.7 (L) 26.0 - 34.0 PG    MCHC 30.2 30.0 - 36.5 g/dL    RDW 18.3 (H) 11.5 - 14.5 %    PLATELET 103 993 - 214 K/uL    MPV 10.4 8.9 - 12.9 FL    NRBC 0.0 0  WBC    ABSOLUTE NRBC 0.00 0.00 - 0.01 K/uL    NEUTROPHILS 51 32 - 75 %    LYMPHOCYTES 29 12 - 49 %    MONOCYTES 13 5 - 13 %    EOSINOPHILS 6 0 - 7 %    BASOPHILS 1 0 - 1 %    IMMATURE GRANULOCYTES 0 0.0 - 0.5 %    ABS. NEUTROPHILS 2.2 1.8 - 8.0 K/UL    ABS. LYMPHOCYTES 1.3 0.8 - 3.5 K/UL    ABS. MONOCYTES 0.6 0.0 - 1.0 K/UL    ABS. EOSINOPHILS 0.3 0.0 - 0.4 K/UL    ABS. BASOPHILS 0.0 0.0 - 0.1 K/UL    ABS. IMMWillam Boykin. 0.0 0.00 - 0.04 K/UL    DF AUTOMATED     NT-PRO BNP    Collection Time: 10/22/22  2:12 AM   Result Value Ref Range    NT pro-BNP 3,665 (H) <125 PG/ML   GLUCOSE, POC    Collection Time: 10/22/22  7:33 AM   Result Value Ref Range    Glucose (POC) 69 65 - 117 mg/dL    Performed by Kirsten Maria PCT                  Signed By: Zeke Roger MD, ALENN                      October 22, 2022                      [email or web address]

## 2022-10-22 NOTE — PROGRESS NOTES
At the time that [facility] called I never received that call. I was told by our  that she had made an appointment for 5 pm./  The note was to [facility] consult for [phone] (but pt had transferred to [id number]) A number was left [phone] It stated if not soon will go to next person tomorrow. Call when can.       So by now guess we will have to call in AM

## 2022-10-22 NOTE — PROGRESS NOTES
With pt's new pain LLL also has congestion there and pt states has had to have that same lung tapped before.   Pt in no distress passed on in report and will address in am.

## 2022-10-22 NOTE — PROGRESS NOTES
Received notification from bedside RN about patient with regards to: new onset LLL pain, crackles on auscultation  VS: /90, HR 83, RR 16, O2 sat 96% on RA    Intervention given: CXR now ordered, pro BNP added to AM labs

## 2022-10-22 NOTE — BSMART NOTE
BSMART Liaison Team Note     LOS:  6     Patient goal(s) for today: communicate needs to staff, continue prescribed medications  BSMART Liaison team focus/goals: assess needs, provide support and encouragement    Progress note:   Pt presented to the ED c/o of nausea, vomiting, and elevated blood sugars. Pt reported he fell asleep - his CGM (pump) fell off - when he awoke, his BG was \"very high\" and he came to the ED. He has since been admitted to the medical floor for DKA . According to RN, Beryl Mcgill, noncompliance with his CGM is a major staff concern due to 4 prior admissions for DKA and respiratory failure and 3 prior admissions for worsening renal function. After his hospitalization on 9/6/22 he was started on HD on M/W/F. Minaledwar Mcgill reports pt is cooperative and mostly pleasant. Pt is received sitting up in bed eating dinner. He is alert, oriented, constricted affect, guarded, thoughts are logical and goal-directed. He reports his mood is \"OK. I'm getting through it. \"  He denies current depression or anxiety, but states his condition makes life difficult. He confirms taking Cymbalta for his neuropathy, \"not depression\", and states he \"might\" be interested in trying a psychiatric medication \"if\" he starts to feel depressed. He reports having a good support system at home: mother, aunt and uncle. He states he has 2 sisters but does not have a good relationship with either. He reports feeling confident about maintaining his pump post-discharge and states he will be [facility] up with [facility] for dialysis MWF. Pt is agreeable to outpatient counseling for support and to help with coping skills to manage his medical challenges. This writer provided pt with a list of counseling resources.       Barriers to Discharge: medical clearance  Guns in the home: no     Outpatient provider(s):  Dr. Kayla Carmona and [facility] Morataya for dialysis   Insurance info/prescription coverage:  [facility] MEDICAID/[place]    Diagnosis: Diabetes mellitus with ketoacidosis without coma    Plan:  DC home with family and dialysis follow-up at [facility]     Follow up Psych Consult placed? Yes - Neva Rothman NP. Psychiatrist updated?  yes       Participating treatment team members: COREEN Valencia,

## 2022-10-22 NOTE — PROGRESS NOTES
Hospitalist Progress Note    NAME: Janiya Razo   :  1980   MRN:  [id number]     Admit date: 10/16/2022    Today's date: 10/22/22    PCP: Ju Hood MD      Anticipated discharge date: 10/24    Barriers:  Hypoglycemia    Assessment / Plan:    DKA POA Admitted with , HCO3 12 with AG 30  Insulin dependent DM type 1 POA on home insulin pump  Recent DKA from pump malfunction 2022  Admit started on insulin drip per Glucostabilizer protocol              Glycemic control improved and AG normalized  Insulin gtt resumed, currently on hold  ? Trigger sepsis  Diabetic teaching  Last A1c= 7.2  Po diet as tolerated - diabetic restrictions  Endocrine consulted, plan to transitioned to subQ insulin rather than pump, recs appreciated and ordered. Insulin adjusted as per Endocrine  ADA diet  Will need to follow up as outpatient to determine if going back on pump is best for patient    Elevated troponin 17 --> 137 --> 133  Setting of DKA and recurrent N/V  At risk for underlying CAD  ASA  Tele, serial labs  Last echo 2022 with LVEF 55-60%, normal wall motion   Normal RV function  Similar bump with DKA admit 2022, [facility]  Underwent stress test on 10/20 which showed No ischemia or infarct demonstrated. LVEF 51%  Nuclear stress test is negative for ischemia preserved ejection fraction. Sepsis POA WBC 14.8, 106, lactate 1.6, procalcitonin 12.51  Possible UTI POA  CXR with no ASD or edema  CT abdomen/pelvis IMPRESSION  1. Questionable mild diffuse colitis. 2. Moderate distal esophagitis. 3. Trace pelvic free fluid. 4. Moderate diffuse bladder wall thickening. NS unable to get UA till today, was ordered since admit  UA 80 mg% ketones, 5 to 10 WBC, 5 to 10 RBCs, negative bacteria  blood and urine cultures NGTD  DC empiric ceftriaxone and vancomycin     Recurrent N/V POA  Generalized abdominal pain POA  ?  Related to DKA, DKA resolved, but persistent   ? Lagging improvement in the DKA  CT abdomen/pelvis IMPRESSION  1. Questionable mild diffuse colitis. 2. Moderate distal esophagitis. 3. Trace pelvic free fluid. 4. Moderate diffuse bladder wall thickening. Normal lipase  Suspect related to DKA     ESRD POA  Recently started on HD, MWF on 9/7/2022  Nephrology consulted, c/w scheduled HD    Severe esophageal/gastric inflammation POA  Seen on previous EGD  IV PPI     Essential HTN POA  C/w Amlodipine, c/w clonidine patch  Cont coreg  Hydralazine added     Recent urinary retention from prior admit POA hansen removed  Hansen in placed 1 month in august/September 2022  Urology concerned for ?? BPH and atonic bladder per consult  Removed last admit several weeks ago, follows with [facility] urology      Depression  Patient admits to being depressed  Will consult psychiatry    Pleurisy  CXR WNL  Better with lidocaine patch  May need to request pulmonology consultation if pain does not improve over the weekend    Overweight POA Body mass index is 24.37 kg/m². Code status: Full  Prophylaxis: Hep SQ  Recommended Disposition: Home w/Family    Subjective:     Chief Complaint / Reason for Physician Visit  Patient seen and examined at bedside. Says he is feeling better and nausea is improving. Tolerated a diet this morning      Continues to have a lot of pleurisy at the site where he underwent thoracentesis     Review of Systems:  Symptom Y/N Comments  Symptom Y/N Comments   Fever/Chills n   Chest Pain n    Poor Appetite    Edema     Cough n   Abdominal Pain y    Sputum    Joint Pain     SOB/JOHNSTON n   Headache     Nausea/vomit y   Tolerating PT/OT     Diarrhea n   Tolerating Diet n    Constipation    Other       Could NOT obtain due to:      Objective:     VITALS:   Last 24hrs VS reviewed since prior progress note.  Most recent are:  Patient Vitals for the past 24 hrs:   Temp Pulse Resp BP SpO2   10/22/22 1128 98.3 °F (36.8 °C) 83 18 (!) 156/88 93 %   10/22/22 0813 98.1 °F (36.7 °C) 82 18 (!) 153/106 92 %   10/22/22 0808 -- 82 -- (!) 188/108 --   10/22/22 0805 -- 83 -- -- 92 %   10/22/22 0352 98.2 °F (36.8 °C) 80 18 137/74 98 %   10/21/22 2327 98.1 °F (36.7 °C) 85 16 (!) 160/97 96 %   10/21/22 1929 98.2 °F (36.8 °C) 83 16 (!) 126/90 96 %   10/21/22 1658 98.6 °F (37 °C) 80 18 (!) 180/85 95 %   10/21/22 1600 -- -- -- -- 95 %   10/21/22 1445 -- 89 -- (!) 186/117 --   10/21/22 1336 -- 85 -- (!) 200/98 --   10/21/22 1329 -- 85 -- (!) 207/107 97 %   10/21/22 1229 97.8 °F (36.6 °C) 85 18 (!) 180/105 --   10/21/22 1215 -- 83 18 (!) 174/98 --   10/21/22 1200 -- 82 18 (!) 181/99 --   10/21/22 1145 -- 81 18 (!) 178/93 --         Intake/Output Summary (Last 24 hours) at 10/22/2022 1134  Last data filed at 10/22/2022 0908  Gross per 24 hour   Intake 540 ml   Output 0 ml   Net 540 ml          Wt Readings from Last 12 Encounters:   10/21/22 72.7 kg (160 lb 4.4 oz)   09/28/22 75.8 kg (167 lb 3.2 oz)   09/13/22 72.1 kg (159 lb)   09/06/22 81.6 kg (180 lb)   08/25/22 83.2 kg (183 lb 6.4 oz)   08/17/22 91.4 kg (201 lb 8 oz)   08/09/22 82.7 kg (182 lb 5.1 oz)   07/14/22 73.9 kg (163 lb)   07/10/22 63.5 kg (140 lb)   06/25/22 71.6 kg (157 lb 13.6 oz)   06/21/22 66.5 kg (146 lb 9.6 oz)   06/07/22 72.6 kg (160 lb)       PHYSICAL EXAM:    I had a face to face encounter and independently examined this patient on 10/22/22 as outlined below:    General: WD, WN. Alert, cooperative, actively vomiting  EENT:  PERRL. Anicteric sclerae. MMM  Neck:  No meningismus, no thyromegaly  Resp:  CTA bilaterally, no wheezing or rales. No accessory muscle use  CV:  Regular  rhythm,  No edema  GI:  Soft, Non distended, generalized tender. +Bowel sounds, no rebound  Neurologic:  Alert and oriented X 3, normal speech, non focal motor exam  Psych:   Not anxious nor agitated  Skin:  No rashes.   No jaundice    Reviewed most current lab test results and cultures  YES  Reviewed most current radiology test results   YES  Review and summation of old records today    NO  Reviewed patient's current orders and MAR    YES  PMH/SH reviewed - no change compared to H&P  ________________________________________________________________________  Care Plan discussed with:    Comments   Patient x    Family      RN x    Care Manager     Consultant                        Multidiciplinary team rounds were held today with , nursing, pharmacist and clinical coordinator. Patient's plan of care was discussed; medications were reviewed and discharge planning was addressed. ________________________________________________________________________      Comments   >50% of visit spent in counseling and coordination of care     ________________________________________________________________________  Les Dempsey MD     Procedures: see electronic medical records for all procedures/Xrays and details which were not copied into this note but were reviewed prior to creation of Plan. LABS:  I reviewed today's most current labs and imaging studies.   Pertinent labs include:  Recent Labs     10/22/22  0212 10/20/22  0426   WBC 4.4 4.5   HGB 12.4 11.1*   HCT 41.0 35.9*    226       Recent Labs     10/22/22  0212 10/21/22  0210 10/20/22  0426    134* 130*   K 3.5 3.1* 3.5    98 96*   CO2 30 28 25   GLU 83 44* 228*   BUN 10 21* 18   CREA 3.00* 4.15* 3.24*   CA 8.3* 8.0* 7.4*   MG 2.0 2.1 2.0   PHOS 2.9  --  4.0

## 2022-10-23 LAB
GLUCOSE BLD STRIP.AUTO-MCNC: 125 MG/DL (ref 65–117)
GLUCOSE BLD STRIP.AUTO-MCNC: 167 MG/DL (ref 65–117)
GLUCOSE BLD STRIP.AUTO-MCNC: 193 MG/DL (ref 65–117)
GLUCOSE BLD STRIP.AUTO-MCNC: 51 MG/DL (ref 65–117)
GLUCOSE BLD STRIP.AUTO-MCNC: 54 MG/DL (ref 65–117)
GLUCOSE BLD STRIP.AUTO-MCNC: 78 MG/DL (ref 65–117)
SERVICE CMNT-IMP: ABNORMAL
SERVICE CMNT-IMP: NORMAL

## 2022-10-23 PROCEDURE — 74011250636 HC RX REV CODE- 250/636: Performed by: INTERNAL MEDICINE

## 2022-10-23 PROCEDURE — 74011250637 HC RX REV CODE- 250/637: Performed by: NURSE PRACTITIONER

## 2022-10-23 PROCEDURE — 74011250637 HC RX REV CODE- 250/637: Performed by: INTERNAL MEDICINE

## 2022-10-23 PROCEDURE — 82962 GLUCOSE BLOOD TEST: CPT

## 2022-10-23 PROCEDURE — 65270000046 HC RM TELEMETRY

## 2022-10-23 PROCEDURE — 97161 PT EVAL LOW COMPLEX 20 MIN: CPT

## 2022-10-23 PROCEDURE — 74011636637 HC RX REV CODE- 636/637: Performed by: GENERAL ACUTE CARE HOSPITAL

## 2022-10-23 PROCEDURE — 74011000250 HC RX REV CODE- 250: Performed by: INTERNAL MEDICINE

## 2022-10-23 PROCEDURE — 97530 THERAPEUTIC ACTIVITIES: CPT

## 2022-10-23 RX ADMIN — MORPHINE SULFATE 2 MG: 2 INJECTION, SOLUTION INTRAMUSCULAR; INTRAVENOUS at 03:49

## 2022-10-23 RX ADMIN — CARVEDILOL 25 MG: 12.5 TABLET, FILM COATED ORAL at 16:45

## 2022-10-23 RX ADMIN — Medication 16 G: at 16:43

## 2022-10-23 RX ADMIN — Medication 2 UNITS: at 08:47

## 2022-10-23 RX ADMIN — HEPARIN SODIUM 5000 UNITS: 5000 INJECTION INTRAVENOUS; SUBCUTANEOUS at 08:44

## 2022-10-23 RX ADMIN — SODIUM CHLORIDE, PRESERVATIVE FREE 10 ML: 5 INJECTION INTRAVENOUS at 05:32

## 2022-10-23 RX ADMIN — MORPHINE SULFATE 2 MG: 2 INJECTION, SOLUTION INTRAMUSCULAR; INTRAVENOUS at 12:30

## 2022-10-23 RX ADMIN — HYDRALAZINE HYDROCHLORIDE 50 MG: 50 TABLET, FILM COATED ORAL at 15:25

## 2022-10-23 RX ADMIN — MORPHINE SULFATE 2 MG: 2 INJECTION, SOLUTION INTRAMUSCULAR; INTRAVENOUS at 23:55

## 2022-10-23 RX ADMIN — Medication 1 UNITS: at 08:47

## 2022-10-23 RX ADMIN — INSULIN GLARGINE 12 UNITS: 100 INJECTION, SOLUTION SUBCUTANEOUS at 08:46

## 2022-10-23 RX ADMIN — MORPHINE SULFATE 2 MG: 2 INJECTION, SOLUTION INTRAMUSCULAR; INTRAVENOUS at 15:24

## 2022-10-23 RX ADMIN — Medication 2 UNITS: at 12:27

## 2022-10-23 RX ADMIN — HYDRALAZINE HYDROCHLORIDE 50 MG: 50 TABLET, FILM COATED ORAL at 08:44

## 2022-10-23 RX ADMIN — TAMSULOSIN HYDROCHLORIDE 0.4 MG: 0.4 CAPSULE ORAL at 08:44

## 2022-10-23 RX ADMIN — MORPHINE SULFATE 2 MG: 2 INJECTION, SOLUTION INTRAMUSCULAR; INTRAVENOUS at 20:35

## 2022-10-23 RX ADMIN — PANTOPRAZOLE SODIUM 40 MG: 40 TABLET, DELAYED RELEASE ORAL at 08:44

## 2022-10-23 RX ADMIN — CARVEDILOL 25 MG: 12.5 TABLET, FILM COATED ORAL at 08:44

## 2022-10-23 RX ADMIN — Medication 1 UNITS: at 12:27

## 2022-10-23 RX ADMIN — SODIUM CHLORIDE, PRESERVATIVE FREE 10 ML: 5 INJECTION INTRAVENOUS at 12:30

## 2022-10-23 RX ADMIN — ACETAMINOPHEN 650 MG: 325 TABLET ORAL at 21:31

## 2022-10-23 RX ADMIN — SODIUM CHLORIDE, PRESERVATIVE FREE 10 ML: 5 INJECTION INTRAVENOUS at 21:24

## 2022-10-23 RX ADMIN — HYDRALAZINE HYDROCHLORIDE 50 MG: 50 TABLET, FILM COATED ORAL at 21:25

## 2022-10-23 RX ADMIN — FINASTERIDE 5 MG: 5 TABLET, FILM COATED ORAL at 08:44

## 2022-10-23 RX ADMIN — HEPARIN SODIUM 5000 UNITS: 5000 INJECTION INTRAVENOUS; SUBCUTANEOUS at 21:24

## 2022-10-23 RX ADMIN — ASPIRIN 81 MG CHEWABLE TABLET 81 MG: 81 TABLET CHEWABLE at 08:44

## 2022-10-23 RX ADMIN — MORPHINE SULFATE 2 MG: 2 INJECTION, SOLUTION INTRAMUSCULAR; INTRAVENOUS at 08:45

## 2022-10-23 RX ADMIN — DULOXETINE HYDROCHLORIDE 60 MG: 30 CAPSULE, DELAYED RELEASE ORAL at 08:44

## 2022-10-23 RX ADMIN — AMLODIPINE BESYLATE 10 MG: 5 TABLET ORAL at 08:49

## 2022-10-23 NOTE — PROGRESS NOTES
Report to [facility]. Spoke with Pych liaison today and reordered psych consult per their request. Liaison called her pych partner to come.

## 2022-10-23 NOTE — PROGRESS NOTES
Hospitalist Progress Note    NAME: Jemma Wray   :  1980   MRN:  [id number]     Admit date: 10/16/2022    Today's date: 10/23/22    PCP: Melissa Turcios MD      Anticipated discharge date: 10/24    Barriers:  Hypoglycemia    Assessment / Plan:    DKA POA Admitted with , HCO3 12 with AG 30  Insulin dependent DM type 1 POA on home insulin pump  Recent DKA from pump malfunction 2022  Admit started on insulin drip per Glucostabilizer protocol              Glycemic control improved and AG normalized  Insulin gtt DC'd  ? Trigger sepsis  Diabetic teaching  Last A1c= 7.2  Po diet as tolerated - diabetic restrictions  Endocrine consulted, plan to transitioned to subQ insulin rather than pump, recs appreciated and ordered. Insulin adjusted as per Endocrine. Glucose has been better controlled  ADA diet  Will need to follow up as outpatient to determine if going back on pump is best for patient    Elevated troponin 17 --> 137 --> 133  Setting of DKA and recurrent N/V  At risk for underlying CAD  ASA  Tele, serial labs  Last echo 2022 with LVEF 55-60%, normal wall motion   Normal RV function  Similar bump with DKA [facility]  Underwent stress test on 10/20 which showed No ischemia or infarct demonstrated. LVEF 51%  Nuclear stress test is negative for ischemia preserved ejection fraction. Sepsis POA WBC 14.8, 106, lactate 1.6, procalcitonin 12.51  Possible UTI POA  CXR with no ASD or edema  CT abdomen/pelvis IMPRESSION  1. Questionable mild diffuse colitis. 2. Moderate distal esophagitis. 3. Trace pelvic free fluid. 4. Moderate diffuse bladder wall thickening. NS unable to get UA till today, was ordered since admit  UA 80 mg% ketones, 5 to 10 WBC, 5 to 10 RBCs, negative bacteria  blood and urine cultures NGTD  DC empiric ceftriaxone and vancomycin     Recurrent N/V POA  Generalized abdominal pain POA  ?  Related to DKA, DKA resolved, but persistent   ? Lagging improvement in the DKA  CT abdomen/pelvis IMPRESSION  1. Questionable mild diffuse colitis. 2. Moderate distal esophagitis. 3. Trace pelvic free fluid. 4. Moderate diffuse bladder wall thickening. Normal lipase  Suspect related to DKA     ESRD POA  Recently started on HD, MWF on 9/7/2022  Nephrology consulted, c/w scheduled HD    Severe esophageal/gastric inflammation POA  Seen on previous EGD  IV PPI     Essential HTN POA  C/w Amlodipine, c/w clonidine patch  Cont coreg  Hydralazine added     Recent urinary retention from prior admit POA hansen removed  Hansen in placed 1 month in august/September 2022  Urology concerned for ?? BPH and atonic bladder per consult  Removed last admit several weeks ago, follows with [facility] urology      Depression  Patient admits to being depressed  Will consult psychiatry, awaiting recs  Has been seen by behavioral health    Pleurisy  CXR WNL  Better with lidocaine patch  May need to request pulmonology consultation if pain does not improve over the weekend. Will place consult for tomorrow    Overweight POA Body mass index is 24.2 kg/m². Code status: Full  Prophylaxis: Hep SQ  Recommended Disposition: Home w/Family    Subjective:     Chief Complaint / Reason for Physician Visit  Patient seen and examined at bedside. Patient continues to feel well. No more nausea vomiting. Tolerating a diet. Still having a lot of pleurisy that is worse with taking deep breaths. He says the lidocaine slightly helps. No additional complaints.     Continues to have a lot of pleurisy at the site where he underwent thoracentesis     Review of Systems:  Symptom Y/N Comments  Symptom Y/N Comments   Fever/Chills n   Chest Pain n    Poor Appetite    Edema     Cough n   Abdominal Pain y    Sputum    Joint Pain     SOB/JOHNSTON n   Headache     Nausea/vomit y   Tolerating PT/OT     Diarrhea n   Tolerating Diet n    Constipation    Other       Could NOT obtain due to:      Objective:     VITALS:   Last 24hrs VS reviewed since prior progress note. Most recent are:  Patient Vitals for the past 24 hrs:   Temp Pulse Resp BP SpO2   10/23/22 0846 98 °F (36.7 °C) 77 18 (!) 191/98 97 %   10/23/22 0346 97.8 °F (36.6 °C) 84 16 (!) 156/99 96 %   10/22/22 2328 98.5 °F (36.9 °C) 83 18 (!) 150/87 96 %   10/22/22 1945 98.5 °F (36.9 °C) 81 16 (!) 155/95 96 %   10/22/22 1752 98 °F (36.7 °C) 80 18 (!) 147/79 97 %   10/22/22 1128 98.3 °F (36.8 °C) 83 18 (!) 156/88 93 %         Intake/Output Summary (Last 24 hours) at 10/23/2022 0910  Last data filed at 10/22/2022 1752  Gross per 24 hour   Intake 440 ml   Output --   Net 440 ml          Wt Readings from Last 12 Encounters:   10/23/22 72.2 kg (159 lb 2.8 oz)   09/28/22 75.8 kg (167 lb 3.2 oz)   09/13/22 72.1 kg (159 lb)   09/06/22 81.6 kg (180 lb)   08/25/22 83.2 kg (183 lb 6.4 oz)   08/17/22 91.4 kg (201 lb 8 oz)   08/09/22 82.7 kg (182 lb 5.1 oz)   07/14/22 73.9 kg (163 lb)   07/10/22 63.5 kg (140 lb)   06/25/22 71.6 kg (157 lb 13.6 oz)   06/21/22 66.5 kg (146 lb 9.6 oz)   06/07/22 72.6 kg (160 lb)       PHYSICAL EXAM:    I had a face to face encounter and independently examined this patient on 10/23/22 as outlined below:    General: WD, WN. Alert, cooperative, actively vomiting  EENT:  PERRL. Anicteric sclerae. MMM  Neck:  No meningismus, no thyromegaly  Resp:  CTA bilaterally, no wheezing or rales. No accessory muscle use  CV:  Regular  rhythm,  No edema  GI:  Soft, Non distended, generalized tender. +Bowel sounds, no rebound  Neurologic:  Alert and oriented X 3, normal speech, non focal motor exam  Psych:   Not anxious nor agitated  Skin:  No rashes.   No jaundice    Reviewed most current lab test results and cultures  YES  Reviewed most current radiology test results   YES  Review and summation of old records today    NO  Reviewed patient's current orders and MAR    YES  PMH/SH reviewed - no change compared to H&P  ________________________________________________________________________  Care Plan discussed with:    Comments   Patient x    Family      RN x    Care Manager     Consultant                        Multidiciplinary team rounds were held today with , nursing, pharmacist and clinical coordinator. Patient's plan of care was discussed; medications were reviewed and discharge planning was addressed. ________________________________________________________________________      Comments   >50% of visit spent in counseling and coordination of care     ________________________________________________________________________  Mak Curry MD     Procedures: see electronic medical records for all procedures/Xrays and details which were not copied into this note but were reviewed prior to creation of Plan. LABS:  I reviewed today's most current labs and imaging studies.   Pertinent labs include:  Recent Labs     10/22/22  0212   WBC 4.4   HGB 12.4   HCT 41.0          Recent Labs     10/22/22  0212 10/21/22  0210    134*   K 3.5 3.1*    98   CO2 30 28   GLU 83 44*   BUN 10 21*   CREA 3.00* 4.15*   CA 8.3* 8.0*   MG 2.0 2.1   PHOS 2.9  --

## 2022-10-23 NOTE — PROGRESS NOTES
End of Shift Note    Bedside shift change report given to ASAD Flores (oncoming nurse) by Rekha Flores RN (offgoing nurse). Report included the following information SBAR, Kardex, MAR, and Recent Results    Shift worked:  [id number]     Shift summary and any significant changes:     Complained about pain PRN morphine. Concerns for physician to address:       Zone phone for oncoming shift:          Activity:  Activity Level: Bath Room Privileges  Number times ambulated in hallways past shift: 0  Number of times OOB to chair past shift: 0    Cardiac:   Cardiac Monitoring: Yes      Cardiac Rhythm: Sinus Rhythm    Access:  Current line(s): PIV     Genitourinary:   Urinary status: oliguric    Respiratory:   O2 Device: None (Room air)  Chronic home O2 use?: NO  Incentive spirometer at bedside: NO       GI:  Last Bowel Movement Date: 10/20/22  Current diet:  ADULT DIET Regular; 3 carb choices (45 gm/meal); Low Potassium (Less than 3000 mg/day)  Passing flatus: YES  Tolerating current diet: YES       Pain Management:   Patient states pain is manageable on current regimen: YES    Skin:  Corey Score: 19  Interventions: increase time out of bed    Patient Safety:  Fall Score:  Total Score: 1  Interventions: pt to call before getting OOB  High Fall Risk: Yes    Length of Stay:  Expected LOS: 3d 19h  Actual LOS: 7      Rekha Flores RN

## 2022-10-23 NOTE — PROGRESS NOTES
Problem: Mobility Impaired (Adult and Pediatric)  Goal: *Acute Goals and Plan of Care (Insert Text)  Description: FUNCTIONAL STATUS PRIOR TO ADMISSION: Patient was modified independent using a rollator for functional mobility outside of home. HOME SUPPORT PRIOR TO ADMISSION: The patient lived with mom and uncle without assist for functional mobility but assist for visual impairment at times    Physical Therapy Goals  Initiated 10/23/2022  1. Patient will move from supine to sit and sit to supine  in bed with independence within 7 day(s). 2.  Patient will transfer from bed to chair and chair to bed with modified independence using the least restrictive device within 7 day(s). 3.  Patient will perform sit to stand with modified independence within 7 day(s). 4.  Patient will ambulate with modified independence for 100 feet with the least restrictive device within 7 day(s). 5.  Patient will increase LEMUS balance score 5 points within 7 day(s). Outcome: Not Met     PHYSICAL THERAPY EVALUATION  Patient: Rekha Reddy (80 y.o. male)  Date: 10/23/2022  Primary Diagnosis: [facility] ([facility] [E10.10]       Precautions: fall risk       ASSESSMENT  Based on the objective data described below, the patient presents with reduced LE strength, impaired balance, increased need for assist with functional mobility and reduced quality of gait s/p hospitalization due to uncontrolled DM1 and DKA. Patient educated on the purpose and benefits of skilled PT and goals to be addressed with pt verbalizing good understanding. Pt received lying in bed and directed in supine and transitioned to sitting EOB with supervision. Patient educated on use of gripper socks or shoes when getting out of bed with pt verbalizing understanding. Pt engaged in transfer from sit to stand with SBA. Pt ambulated to and from bathroom 40' with SBA and pt reaching for the wall and items for stability.  Pt reports feeling less steady and not needing a device in the home. Patient educated on calling for assist when getting out of bed to ensure safety at this time. Pt verbalized good understanding. Pt can continue to benefit from skilled PT to improve independence with functional mobility prior to discharge when medically appropriate. Current Level of Function Impacting Discharge (mobility/balance): SBA for transfer and ambulate to bathroom without device    Functional Outcome Measure: The patient scored 31/56 on the LEMUS balance assessment outcome measure which is indicative of moderate risk for falls. Other factors to consider for discharge: pt presents below baseline function     Patient will benefit from skilled therapy intervention to address the above noted impairments. PLAN :  Recommendations and Planned Interventions: bed mobility training, transfer training, gait training, therapeutic exercises, neuromuscular re-education, patient and family training/education, and therapeutic activities      Frequency/Duration: Patient will be followed by physical therapy:  3 times a week to address goals. Recommendation for discharge: (in order for the patient to meet his/her long term goals)  Physical therapy at least 2 days/week in the home AND ensure assist and/or supervision for safety with functional mobility    This discharge recommendation:  Has been made in collaboration with the attending provider and/or case management    IF patient discharges home will need the following DME: to be determined (TBD)         SUBJECTIVE:   Patient stated I don't feel as steady.     OBJECTIVE DATA SUMMARY:   HISTORY:    Past Medical History:   Diagnosis Date    Bipolar 1 disorder, depressed ([facility]     Bipolar disorder ([facility]     Chronic kidney disease, stage 3a ([facility]     Depression     Diabetes ([facility]     DKA, type 1 ([facility] 1/27/2013    diagnosed age 23    DKA, type 1 ([facility]     H/O noncompliance with medical treatment, presenting hazards to health     MRSA (methicillin resistant staph aureus) culture positive     MRSA (methicillin resistant Staphylococcus aureus)     Face    Noncompliance with medication regimen     Second hand smoke exposure     Seizure ([facility]     Seizures ([facility] 2006 or 2007    one episode [facility]     Past Surgical History:   Procedure Laterality Date    HX HEENT      top left wisdom tooth    HX ORTHOPAEDIC Left     wrist; MCV    IR INSERT NON TUNL CVC OVER 5 YRS  9/7/2022    IR INSERT TUNL CVC W/O PORT OVER 5 YR  9/9/2022    UPPER GI ENDOSCOPY,BIOPSY  11/20/2018            Personal factors and/or comorbidities impacting plan of care: unsteady on feet    Home Situation  Home Environment: Private residence  # Steps to Enter: 6  Rails to Enter: Yes  Hand Rails : Bilateral  Wheelchair Ramp: Yes  One/Two Story Residence: One story  Living Alone: Yes  Support Systems:  (uncle and mom)  Patient Expects to be Discharged to[de-identified] Home with family assistance  Current DME Used/Available at Home: [address], rollator, Grab bars, Shower chair (uses rollator outside home)  Tub or Shower Type: Tub/Shower combination    EXAMINATION/PRESENTATION/DECISION MAKING:   Critical Behavior:  Neurologic State: Alert, Eyes open spontaneously  Orientation Level: Oriented X4  Cognition: Appropriate decision making, Appropriate safety awareness, Follows commands     Hearing:   Auditory  Auditory Impairment: None  Skin:    Edema:   Range Of Motion:  AROM: Within functional limits           PROM: Within functional limits           Strength:    Strength: Generally decreased, functional                    Tone & Sensation:   Tone: Normal              Sensation: Intact               Coordination:  Coordination: Within functional limits  Vision:      Functional Mobility:  Bed Mobility:     Supine to Sit: Supervision  Sit to Supine: Supervision     Transfers:  Sit to Stand: Stand-by assistance  Stand to Sit: Stand-by assistance                       Balance:   Sitting: Intact  Standing: Impaired  Standing - Static: Good  Standing - Dynamic : Fair  Ambulation/Gait Training:  Distance (ft): 40 Feet (ft)  Assistive Device: Gait belt           Gait Abnormalities: Decreased step clearance        Base of Support: Widened     Speed/Ana: Pace decreased (<100 feet/min)                        Stairs: Therapeutic Exercises:       Functional Measure:  Fish Balance Test:    Sitting to Standing: 3  Standing Unsupported: 3  Sitting with Back Unsupported: 4  Standing to Sitting: 3  Transfers: 4  Standing Unsupported with Eyes Closed: 3  Standing Unsupported with Feet Together: 2  Reach Forward with Outstretched Arm: 1   Object: 1  Turn to Look Over Shoulders: 1  Turn 360 Degrees: 3  Alternate Foot on Step/Stool: 1  Standing Unsupported One Foot in Front: 2  Stand on One Le  Total: 31/56         56=Maximum possible score;   0-20=High fall risk  21-40=Moderate fall risk   41-56=Low fall risk               Physical Therapy Evaluation Charge Determination   History Examination Presentation Decision-Making   LOW Complexity : Zero comorbidities / personal factors that will impact the outcome / POC LOW Complexity : 1-2 Standardized tests and measures addressing body structure, function, activity limitation and / or participation in recreation  LOW Complexity : Stable, uncomplicated  LOW Complexity : FOTO score of       Based on the above components, the patient evaluation is determined to be of the following complexity level: LOW     Pain Ratin/10    Activity Tolerance:   Fair    After treatment patient left in no apparent distress:   Supine in bed and Call bell within reach    COMMUNICATION/EDUCATION:   The patients plan of care was discussed with: Registered nurse. Fall prevention education was provided and the patient/caregiver indicated understanding., Patient/family have participated as able in goal setting and plan of care. , and Patient/family agree to work toward stated goals and plan of care.     Thank you for this referral.  Luci Diallo, PT   Time Calculation: 16 mins

## 2022-10-23 NOTE — BH NOTES
NP attempted to call for psychiatric consult at the following number x [phone] and was unable to connect with anyone. Please call in consult in it is still needed. Thank you.

## 2022-10-24 ENCOUNTER — APPOINTMENT (OUTPATIENT)
Dept: CT IMAGING | Age: 42
DRG: 420 | End: 2022-10-24
Attending: INTERNAL MEDICINE
Payer: MEDICAID

## 2022-10-24 LAB
ALBUMIN SERPL-MCNC: 2.1 G/DL (ref 3.5–5)
ANION GAP SERPL CALC-SCNC: 5 MMOL/L (ref 5–15)
BACTERIA SPEC CULT: NORMAL
BUN SERPL-MCNC: 25 MG/DL (ref 6–20)
BUN/CREAT SERPL: 5 (ref 12–20)
CALCIUM SERPL-MCNC: 8.6 MG/DL (ref 8.5–10.1)
CHLORIDE SERPL-SCNC: 101 MMOL/L (ref 97–108)
CO2 SERPL-SCNC: 28 MMOL/L (ref 21–32)
CREAT SERPL-MCNC: 4.7 MG/DL (ref 0.7–1.3)
ERYTHROCYTE [DISTWIDTH] IN BLOOD BY AUTOMATED COUNT: 18.3 % (ref 11.5–14.5)
GLUCOSE BLD STRIP.AUTO-MCNC: 101 MG/DL (ref 65–117)
GLUCOSE BLD STRIP.AUTO-MCNC: 215 MG/DL (ref 65–117)
GLUCOSE BLD STRIP.AUTO-MCNC: 73 MG/DL (ref 65–117)
GLUCOSE SERPL-MCNC: 130 MG/DL (ref 65–100)
HCT VFR BLD AUTO: 40.2 % (ref 36.6–50.3)
HGB BLD-MCNC: 12.5 G/DL (ref 12.1–17)
MCH RBC QN AUTO: 26.4 PG (ref 26–34)
MCHC RBC AUTO-ENTMCNC: 31.1 G/DL (ref 30–36.5)
MCV RBC AUTO: 84.8 FL (ref 80–99)
NRBC # BLD: 0 K/UL (ref 0–0.01)
NRBC BLD-RTO: 0 PER 100 WBC
PHOSPHATE SERPL-MCNC: 4.1 MG/DL (ref 2.6–4.7)
PLATELET # BLD AUTO: 297 K/UL (ref 150–400)
PMV BLD AUTO: 11.3 FL (ref 8.9–12.9)
POTASSIUM SERPL-SCNC: 4.1 MMOL/L (ref 3.5–5.1)
RBC # BLD AUTO: 4.74 M/UL (ref 4.1–5.7)
SERVICE CMNT-IMP: ABNORMAL
SERVICE CMNT-IMP: NORMAL
SODIUM SERPL-SCNC: 134 MMOL/L (ref 136–145)
WBC # BLD AUTO: 5.5 K/UL (ref 4.1–11.1)

## 2022-10-24 PROCEDURE — 74011250636 HC RX REV CODE- 250/636: Performed by: INTERNAL MEDICINE

## 2022-10-24 PROCEDURE — 71275 CT ANGIOGRAPHY CHEST: CPT

## 2022-10-24 PROCEDURE — 85027 COMPLETE CBC AUTOMATED: CPT

## 2022-10-24 PROCEDURE — 74011000250 HC RX REV CODE- 250: Performed by: INTERNAL MEDICINE

## 2022-10-24 PROCEDURE — 74011000636 HC RX REV CODE- 636: Performed by: INTERNAL MEDICINE

## 2022-10-24 PROCEDURE — 36415 COLL VENOUS BLD VENIPUNCTURE: CPT

## 2022-10-24 PROCEDURE — 90935 HEMODIALYSIS ONE EVALUATION: CPT

## 2022-10-24 PROCEDURE — 74011636637 HC RX REV CODE- 636/637: Performed by: GENERAL ACUTE CARE HOSPITAL

## 2022-10-24 PROCEDURE — 82962 GLUCOSE BLOOD TEST: CPT

## 2022-10-24 PROCEDURE — 80069 RENAL FUNCTION PANEL: CPT

## 2022-10-24 PROCEDURE — 74011250637 HC RX REV CODE- 250/637: Performed by: INTERNAL MEDICINE

## 2022-10-24 PROCEDURE — 65270000046 HC RM TELEMETRY

## 2022-10-24 PROCEDURE — 74011250637 HC RX REV CODE- 250/637: Performed by: NURSE PRACTITIONER

## 2022-10-24 RX ORDER — INSULIN GLARGINE 100 [IU]/ML
8 INJECTION, SOLUTION SUBCUTANEOUS
Status: DISCONTINUED | OUTPATIENT
Start: 2022-10-24 | End: 2022-10-25 | Stop reason: HOSPADM

## 2022-10-24 RX ORDER — INSULIN LISPRO 100 [IU]/ML
1 INJECTION, SOLUTION INTRAVENOUS; SUBCUTANEOUS
Status: DISCONTINUED | OUTPATIENT
Start: 2022-10-24 | End: 2022-10-25 | Stop reason: HOSPADM

## 2022-10-24 RX ORDER — INSULIN GLARGINE 100 [IU]/ML
10 INJECTION, SOLUTION SUBCUTANEOUS
Status: DISCONTINUED | OUTPATIENT
Start: 2022-10-25 | End: 2022-10-25 | Stop reason: HOSPADM

## 2022-10-24 RX ADMIN — HEPARIN SODIUM 1800 UNITS: 1000 INJECTION, SOLUTION INTRAVENOUS; SUBCUTANEOUS at 10:59

## 2022-10-24 RX ADMIN — MORPHINE SULFATE 2 MG: 2 INJECTION, SOLUTION INTRAMUSCULAR; INTRAVENOUS at 15:39

## 2022-10-24 RX ADMIN — MORPHINE SULFATE 2 MG: 2 INJECTION, SOLUTION INTRAMUSCULAR; INTRAVENOUS at 12:33

## 2022-10-24 RX ADMIN — CARVEDILOL 25 MG: 12.5 TABLET, FILM COATED ORAL at 17:22

## 2022-10-24 RX ADMIN — MORPHINE SULFATE 2 MG: 2 INJECTION, SOLUTION INTRAMUSCULAR; INTRAVENOUS at 23:19

## 2022-10-24 RX ADMIN — SODIUM CHLORIDE, PRESERVATIVE FREE 10 ML: 5 INJECTION INTRAVENOUS at 06:28

## 2022-10-24 RX ADMIN — AMLODIPINE BESYLATE 10 MG: 5 TABLET ORAL at 12:34

## 2022-10-24 RX ADMIN — IOPAMIDOL 100 ML: 755 INJECTION, SOLUTION INTRAVENOUS at 11:54

## 2022-10-24 RX ADMIN — HYDRALAZINE HYDROCHLORIDE 50 MG: 50 TABLET, FILM COATED ORAL at 21:19

## 2022-10-24 RX ADMIN — DULOXETINE HYDROCHLORIDE 60 MG: 30 CAPSULE, DELAYED RELEASE ORAL at 12:33

## 2022-10-24 RX ADMIN — Medication 2 UNITS: at 21:19

## 2022-10-24 RX ADMIN — TAMSULOSIN HYDROCHLORIDE 0.4 MG: 0.4 CAPSULE ORAL at 12:33

## 2022-10-24 RX ADMIN — FINASTERIDE 5 MG: 5 TABLET, FILM COATED ORAL at 12:34

## 2022-10-24 RX ADMIN — SODIUM CHLORIDE, PRESERVATIVE FREE 10 ML: 5 INJECTION INTRAVENOUS at 21:20

## 2022-10-24 RX ADMIN — ASPIRIN 81 MG CHEWABLE TABLET 81 MG: 81 TABLET CHEWABLE at 12:33

## 2022-10-24 RX ADMIN — HEPARIN SODIUM 5000 UNITS: 5000 INJECTION INTRAVENOUS; SUBCUTANEOUS at 06:28

## 2022-10-24 RX ADMIN — HYDRALAZINE HYDROCHLORIDE 50 MG: 50 TABLET, FILM COATED ORAL at 15:39

## 2022-10-24 RX ADMIN — HEPARIN SODIUM 5000 UNITS: 5000 INJECTION INTRAVENOUS; SUBCUTANEOUS at 21:19

## 2022-10-24 RX ADMIN — MORPHINE SULFATE 2 MG: 2 INJECTION, SOLUTION INTRAMUSCULAR; INTRAVENOUS at 19:44

## 2022-10-24 RX ADMIN — SODIUM CHLORIDE, PRESERVATIVE FREE 10 ML: 5 INJECTION INTRAVENOUS at 12:34

## 2022-10-24 NOTE — PROGRESS NOTES
1900: Bedside shift change report given to [facility] Gwendolyn  (oncoming nurse) by Mani Chaudhary (offgoing nurse). Report included the following information SBAR, Kardex, Intake/Output, MAR, Recent Results, and Cardiac Rhythm NSR . Alfa Mitchell, Student RN, will be charting assessments on this patient. 2035: PRN morphine given for pain. 2130: Prn tylenol given for pain. 2355: PRN morphine given for pain. 0630: Report given to [facility] with dialysis. 0700: End of Shift Note    Bedside shift change report given to Mark KAMARA (oncoming nurse) by Rosy Manriquez RN (offgoing nurse). Report included the following information SBAR, Kardex, Intake/Output, MAR, Recent Results, and Cardiac Rhythm MSR    Shift worked:  [id number]     Shift summary and any significant changes:     PRN morphine given 2x, PRN tylenol given 1x. Concerns for physician to address:       Zone phone for oncoming shift:          Activity:  Activity Level: Bath Room Privileges, Up ad leeanne  Number times ambulated in hallways past shift: 0  Number of times OOB to chair past shift: 0    Cardiac:   Cardiac Monitoring: Yes      Cardiac Rhythm: Sinus Rhythm    Access:  Current line(s): PIV and HD access     Genitourinary:   Urinary status: voiding    Respiratory:   O2 Device: None (Room air)  Chronic home O2 use?: NO  Incentive spirometer at bedside: YES       GI:  Last Bowel Movement Date: 10/16/22  Current diet:  ADULT DIET Regular; 3 carb choices (45 gm/meal); Low Potassium (Less than 3000 mg/day)  Passing flatus: YES  Tolerating current diet: YES       Pain Management:   Patient states pain is manageable on current regimen: YES    Skin:  Corey Score: 20  Interventions: increase time out of bed    Patient Safety:  Fall Score:  Total Score: 1  Interventions: bed/chair alarm, assistive device (walker, cane, etc), gripper socks, and pt to call before getting OOB  High Fall Risk: Yes    Length of Stay:  Expected LOS: 3d 19h  Actual LOS: [address], RN            Problem: Diabetes Self-Management  Goal: *Disease process and treatment process  Description: Define diabetes and identify own type of diabetes; list 3 options for treating diabetes. Outcome: Progressing Towards Goal     Problem: Falls - Risk of  Goal: *Absence of Falls  Description: Document Abram Fail Fall Risk and appropriate interventions in the flowsheet. Outcome: Progressing Towards Goal  Note: Fall Risk Interventions:  Mobility Interventions: Patient to call before getting OOB         Medication Interventions: Patient to call before getting OOB         History of Falls Interventions: Bed/chair exit alarm         Problem: Pressure Injury - Risk of  Goal: *Prevention of pressure injury  Description: Document Corey Scale and appropriate interventions in the flowsheet. Outcome: Progressing Towards Goal  Note: Pressure Injury Interventions:  Sensory Interventions: Assess changes in LOC, Avoid rigorous massage over bony prominences         Activity Interventions: Increase time out of bed, PT/OT evaluation    Mobility Interventions: PT/OT evaluation, Turn and reposition approx.  every two hours(pillow and wedges)    Nutrition Interventions: Document food/fluid/supplement intake    Friction and Shear Interventions: Lift sheet, Minimize layers

## 2022-10-24 NOTE — PROGRESS NOTES
IMPRESSION:   Left sided CP  Small to moderate left sided pleural effusion  ESRD- HD M/W/F  Prior Left pleural effusion- s/p thoracentesis 9/2022 with improvement of symptoms  Can not exclude small peripheral cavitary lesion on CT  H/O recreational drug use (last IV in 2016)  DKA- resolved  Prior tobacco abuse  H/o HTN      RECOMMENDATIONS/PLAN:   On room air  Repeat thoracentesis as it gave him relief previously, though fluid appears related to volume overload  Check echo to evaluate for vegetation  Empiric antibiotics   Pain control  HD per renal          10-24-22: no events. Still with mild to moderate intermittent pleuritic left sided chest pain    Subjective/Initial History:   I have reviewed the flowsheet and previous days notes. Seen earlier today on rounds. I was asked by Barbie Yang MD to see Rekha Reddy a 46 y.o.  male  in consultation for a chief complaint of CP.     47 yo M with a history ESRD (HD M/W/F), DM, L effusion (s/p thoracentesis 9/2022), prior tobacco abuse presented initially with hyperglycemia found to be in dka. He was traeaed with insulin gtt and his dka resolved. Pulm was consulted today due to sudden onset left posterior back pain. He underwent L thoracentesis in September and was experiencing a similar discomfort then. CXR 10/22/22 reveals no obvious effusion or any other acute airspace disease. He quit smoking years ago after 15 pack year history but denies any known lung diseases. The patient is unable to give any meaningful history or review of systems due to patient factors.  Excerpts from admission notes as follows:     \"\"    PMH:  has a past medical history of Bipolar 1 disorder, depressed ([address] Bipolar disorder ([address] Chronic kidney disease, stage 3a ([address] Depression, Diabetes ([address] DKA, type 1 ([address] (1/27/2013), DKA, type 1 ([address] H/O noncompliance with medical treatment, presenting hazards to health, MRSA (methicillin resistant staph aureus) culture positive, MRSA (methicillin resistant Staphylococcus aureus), Noncompliance with medication regimen, Second hand smoke exposure, Seizure ([address] and Seizures ([facility] (2006 or 2007). PSH:   has a past surgical history that includes hx orthopaedic (Left); hx heent; upper gi endoscopy,biopsy (11/20/2018); ir insert non tunl cvc over 5 yrs (9/7/2022); and ir insert tunl cvc w/o port over 5 yr (9/9/2022). FHX: family history includes Diabetes in his mother and another family member. SHX:  reports that he quit smoking about 2 years ago. His smoking use included cigarettes. He started smoking about 23 years ago. He has a 1.60 pack-year smoking history. He has never used smokeless tobacco. He reports that he does not drink alcohol and does not use drugs. ROS:A comprehensive review of systems was negative except for that written in the HPI.     No Known Allergies     MAR reviewed and pertinent medications noted or modified as needed  MEDS:   Current Facility-Administered Medications   Medication    [START ON 10/25/2022] insulin glargine (LANTUS) injection 10 Units    insulin glargine (LANTUS) injection 8 Units    insulin lispro (HUMALOG) injection 1 Units    heparin (porcine) 1,000 unit/mL injection 1,800 Units    And    heparin (porcine) 1,000 unit/mL injection 1,800 Units    metoclopramide HCl (REGLAN) injection 5 mg    lidocaine 4 % patch 1 Patch    prochlorperazine (COMPAZINE) injection 10 mg    pantoprazole (PROTONIX) tablet 40 mg    hydrALAZINE (APRESOLINE) tablet 50 mg    sodium chloride (NS) flush 10 mL    insulin lispro (HUMALOG) injection    glucose chewable tablet 16 g    glucagon (GLUCAGEN) injection 1 mg    dextrose 10% infusion 0-250 mL    cloNIDine (CATAPRES) 0.1 mg/24 hr patch 1 Patch    carvediloL (COREG) tablet 25 mg    aspirin chewable tablet 81 mg    lidocaine (XYLOCAINE) 2 % viscous solution 15 mL    benzocaine-menthoL (CEPACOL) lozenge 1 Lozenge    morphine injection 2 mg    amLODIPine (NORVASC) tablet 10 mg    hydrALAZINE (APRESOLINE) 20 mg/mL injection 20 mg    DULoxetine (CYMBALTA) capsule 60 mg    finasteride (PROSCAR) tablet 5 mg    tamsulosin (FLOMAX) capsule 0.4 mg    sodium chloride (NS) flush 5-40 mL    sodium chloride (NS) flush 5-40 mL    acetaminophen (TYLENOL) tablet 650 mg    Or    acetaminophen (TYLENOL) suppository 650 mg    polyethylene glycol (MIRALAX) packet 17 g    heparin (porcine) injection 5,000 Units        Objective:     Vital Signs: Telemetry:    normal sinus rhythm Intake/Output:   Visit Vitals  BP (!) 177/76   Pulse 93   Temp 97.7 °F (36.5 °C) (Oral)   Resp 18   Ht 5' 8\" (1.727 m)   Wt 76.2 kg (167 lb 15.9 oz)   SpO2 91%   BMI 25.54 kg/m²       Temp (24hrs), Av.9 °F (36.6 °C), Min:97.7 °F (36.5 °C), Max:98.4 °F (36.9 °C)        O2 Device: None (Room air)         Wt Readings from Last 4 Encounters:   10/24/22 76.2 kg (167 lb 15.9 oz)   22 75.8 kg (167 lb 3.2 oz)   22 72.1 kg (159 lb)   22 81.6 kg (180 lb)          Intake/Output Summary (Last 24 hours) at 10/24/2022 1318  Last data filed at 10/24/2022 1057  Gross per 24 hour   Intake --   Output 0 ml   Net 0 ml       Last shift:      No intake/output data recorded. Last 3 shifts: No intake/output data recorded. Physical Exam:    Gen: no distress  HEENT: poor dentition  Cardiac: no murmurs  Lungs: clear b/l , Point tenderness along left posterior chest wall  Abd: non tender  Neuro: aoo3    Data:   Labs:  Recent Labs     10/24/22  0736 10/22/22  0212   WBC 5.5 4.4   HGB 12.5 12.4   HCT 40.2 41.0    213     Recent Labs     10/24/22  0736 10/22/22  0212   * 136   K 4.1 3.5    101   CO2 28 30   * 83   BUN 25* 10   CREA 4.70* 3.00*   CA 8.6 8.3*   MG  --  2.0   PHOS 4.1 2.9   ALB 2.1*  --      No results for input(s): PHI, PCO2I, PO2I, HCO3I, FIO2I in the last 72 hours. Imaging:  I have personally reviewed the patients radiographs:  Small effusion.   Can not exclude small peripheral left sided cavitary lesion        Charles Bonilla MD

## 2022-10-24 NOTE — PROGRESS NOTES
Progress note    Patient: Srinivasan Starkey MRN: [id number]  SSN: [id number]    YOB: 1980  Age: 46 y.o. Sex: male      Subjective: Srinivasan Starkey is a 46 y.o. male who is being seen for DKA admitted on 10/16/2022. Mr. Ela Braswell is a patient of Dr. Sammy Owens who saw him in June 2022. Since that time he has been in the hospital for DKA four times and respiratory failure and worsening renal function three times. After his hospitalization on 9/6/22 he was started on HD on M/W/F. Pt reports similar to his last presentation for DKA that he fell asleep last night and his CGM fell off. When he woke up his BG was \"very high\" and he came to the ED. This time he says his insulin pump was functioning normally with no alarms or insulin/leakage/smell or other issue. He continues to complain of nausea and low appetite. His on admission BG was 924 and his AG was 30, K 5.5, Na 130 ( autumn 143) . He received 12 units Lantus for past 2 days and meal time 2 units + low dose SS (total 6 units yesterday) and had 2 episodes of BS 69 and 54 requiring 4 4 glucose tabs.  this morning.     Past Medical History:   Diagnosis Date    Bipolar 1 disorder, depressed ([address]     Bipolar disorder ([address]     Chronic kidney disease, stage 3a ([address]     Depression     Diabetes ([address]     DKA, type 1 ([address] 1/27/2013    diagnosed age 23    DKA, type 1 ([address]     H/O noncompliance with medical treatment, presenting hazards to health     MRSA (methicillin resistant staph aureus) culture positive     MRSA (methicillin resistant Staphylococcus aureus)     Face    Noncompliance with medication regimen     Second hand smoke exposure     Seizure ([address]     Seizures ([address] 2006 or 2007    one episode during [facility]     Past Surgical History:   Procedure Laterality Date    HX HEENT      top left wisdom tooth    HX ORTHOPAEDIC Left     wrist; MCV    IR INSERT NON TUNL CVC OVER 5 YRS  9/7/2022    IR INSERT TUNL CVC W/O PORT OVER 5 YR 2022    UPPER GI ENDOSCOPY,BIOPSY  2018           Family History   Problem Relation Age of Onset    Diabetes Mother     Diabetes Other         neice, type 1      Social History     Tobacco Use    Smoking status: Former     Packs/day: 0.10     Years: 16.00     Pack years: 1.60     Types: Cigarettes     Start date: 10/4/1999     Quit date: 2020     Years since quittin.6    Smokeless tobacco: Never   Substance Use Topics    Alcohol use: No      Current Facility-Administered Medications   Medication Dose Route Frequency Provider Last Rate Last Admin    [START ON 10/25/2022] insulin glargine (LANTUS) injection 10 Units  10 Units SubCUTAneous 4 TIMES/WEEK (T,TH,SA,BRAVO) Keith Byrd MD        insulin glargine (LANTUS) injection 8 Units  8 Units SubCUTAneous Q MON, WED & FRI Keith Byrd MD        insulin lispro (HUMALOG) injection 1 Units  1 Units SubCUTAneous TIDAC Keith Byrd MD        metoclopramide HCl (REGLAN) injection 5 mg  5 mg IntraVENous Q12H PRN Aaliyah Burks MD        lidocaine 4 % patch 1 Patch  1 Patch TransDERmal Q24H Aaliyah Burks MD   1 Patch at 10/23/22 1525    prochlorperazine (COMPAZINE) injection 10 mg  10 mg IntraVENous Q6H PRN Aaliyah Burks MD        pantoprazole (PROTONIX) tablet 40 mg  40 mg Oral ACB Aaliyah Burks MD   40 mg at 10/23/22 0844    hydrALAZINE (APRESOLINE) tablet 50 mg  50 mg Oral TID Maeve Augustine, NP   50 mg at 10/23/22 2125    sodium chloride (NS) flush 10 mL  10 mL IntraVENous PRN Deanna Clark MD        insulin lispro (HUMALOG) injection   SubCUTAneous AC&HS Keith Byrd MD   1 Units at 10/23/22 1227    glucose chewable tablet 16 g  4 Tablet Oral PRN Aaliyah Burks MD   16 g at 10/23/22 1643    glucagon (GLUCAGEN) injection 1 mg  1 mg IntraMUSCular PRN Aaliyah Burks MD        dextrose 10% infusion 0-250 mL  0-250 mL IntraVENous PRN Aaliyah Burks MD   250 mL at 10/21/22 0245    cloNIDine (CATAPRES) 0.1 mg/24 hr patch 1 Patch  1 Patch TransDERmal Q7D Rekha No MD   1 Patch at 10/20/22 1500    carvediloL (COREG) tablet 25 mg  25 mg Oral BID WITH MEALS Elder Augustine, NP   25 mg at 10/23/22 1645    aspirin chewable tablet 81 mg  81 mg Oral DAILY Laxmi Fitzgerald MD   81 mg at 10/23/22 0844    lidocaine (XYLOCAINE) 2 % viscous solution 15 mL  15 mL Mouth/Throat PRN Din, Yulissa Zelaya MD        benzocaine-menthoL (CEPACOL) lozenge 1 Lozenge  1 Lozenge Mucous Membrane PRN Din, Yulissa Zelaya MD        morphine injection 2 mg  2 mg IntraVENous Q3H PRN Laxmi Fitzgerald MD   2 mg at 10/23/22 2355    amLODIPine (NORVASC) tablet 10 mg  10 mg Oral DAILY Laxmi Fitzgerald MD   10 mg at 10/23/22 0849    hydrALAZINE (APRESOLINE) 20 mg/mL injection 20 mg  20 mg IntraVENous Q6H PRN Laxmi Fitzgerald MD   20 mg at 10/20/22 0446    DULoxetine (CYMBALTA) capsule 60 mg  60 mg Oral DAILY Hakan Martinez MD   60 mg at 10/23/22 0844    finasteride (PROSCAR) tablet 5 mg  5 mg Oral DAILY Hakan Martinez MD   5 mg at 10/23/22 0844    tamsulosin (FLOMAX) capsule 0.4 mg  0.4 mg Oral DAILY Hakan Martinez MD   0.4 mg at 10/23/22 0844    sodium chloride (NS) flush 5-40 mL  5-40 mL IntraVENous Q8H Hakan Martinez MD   10 mL at 10/24/22 6669    sodium chloride (NS) flush 5-40 mL  5-40 mL IntraVENous PRN Hakan Martinez MD   10 mL at 10/22/22 1210    acetaminophen (TYLENOL) tablet 650 mg  650 mg Oral Q6H PRN Hakan Martinez MD   650 mg at 10/23/22 2131    Or    acetaminophen (TYLENOL) suppository 650 mg  650 mg Rectal Q6H PRN Hakan Martinez MD        polyethylene glycol (MIRALAX) packet 17 g  17 g Oral DAILY PRN Hakan Martinez MD        heparin (porcine) injection 5,000 Units  5,000 Units SubCUTAneous Solis Gaston MD   5,000 Units at 10/24/22 0628        No Known Allergies    Objective:     Vitals:    10/24/22 0731 10/24/22 0745 10/24/22 0800 10/24/22 0815   BP: (!) 174/99 (!) 152/84 122/72 124/69   Pulse: 69 68 70 70   Resp: 18 18 18 18   Temp: 97.7 °F (36.5 °C)      TempSrc: Oral      SpO2:       Weight:       Height:            Recent Results (from the past 24 hour(s))   GLUCOSE, POC    Collection Time: 10/23/22 12:00 PM   Result Value Ref Range    Glucose (POC) 167 (H) 65 - 117 mg/dL    Performed by Juanell Market PCT    GLUCOSE, POC    Collection Time: 10/23/22  4:23 PM   Result Value Ref Range    Glucose (POC) 51 (LL) 65 - 117 mg/dL    Performed by Juanell Market PCT    GLUCOSE, POC    Collection Time: 10/23/22  4:37 PM   Result Value Ref Range    Glucose (POC) 54 (L) 65 - 117 mg/dL    Performed by Juanell Market PCT    GLUCOSE, POC    Collection Time: 10/23/22  4:58 PM   Result Value Ref Range    Glucose (POC) 78 65 - 117 mg/dL    Performed by Juanell Market PCT    GLUCOSE, POC    Collection Time: 10/23/22  8:34 PM   Result Value Ref Range    Glucose (POC) 125 (H) 65 - 117 mg/dL    Performed by [facility]    RENAL FUNCTION PANEL    Collection Time: 10/24/22  7:36 AM   Result Value Ref Range    Sodium 134 (L) 136 - 145 mmol/L    Potassium 4.1 3.5 - 5.1 mmol/L    Chloride 101 97 - 108 mmol/L    CO2 28 21 - 32 mmol/L    Anion gap 5 5 - 15 mmol/L    Glucose 130 (H) 65 - 100 mg/dL    BUN 25 (H) 6 - 20 MG/DL    Creatinine 4.70 (H) 0.70 - 1.30 MG/DL    BUN/Creatinine ratio 5 (L) 12 - 20      eGFR 15 (L) >60 ml/min/1.73m2    Calcium 8.6 8.5 - 10.1 MG/DL    Phosphorus 4.1 2.6 - 4.7 MG/DL    Albumin 2.1 (L) 3.5 - 5.0 g/dL   CBC W/O DIFF    Collection Time: 10/24/22  7:36 AM   Result Value Ref Range    WBC 5.5 4.1 - 11.1 K/uL    RBC 4.74 4.10 - 5.70 M/uL    HGB 12.5 12.1 - 17.0 g/dL    HCT 40.2 36.6 - 50.3 %    MCV 84.8 80.0 - 99.0 FL    MCH 26.4 26.0 - 34.0 PG    MCHC 31.1 30.0 - 36.5 g/dL    RDW 18.3 (H) 11.5 - 14.5 %    PLATELET 179 399 - 109 K/uL    MPV 11.3 8.9 - 12.9 FL    NRBC 0.0 0  WBC    ABSOLUTE NRBC 0.00 0.00 - 0.01 K/uL       Review of most recent diabetes-related labs:  Lab Results   Component Value Date    HBA1C 7.2 (H) 09/26/2022    HBA1C 8.7 (H) 08/12/2022    HBA1C 12.1 (H) 07/10/2022    WOF3KWPT 13.2 04/01/2021    ODS6VXQD 13.7 09/25/2020    CHOL 192 11/15/2021    LDLC 89.4 11/15/2021    GFRAA 24 (L) 09/28/2022    GFRNA 20 (L) 09/28/2022    MCACR 11,439 (H) 04/01/2021    GADLT 43.0 (H) 07/14/2021    TSH 4.30 (H) 08/12/2022    VITD3 34.6 11/15/2021     Lab Key:  [id number] = IA-2 pancreatic islet cell autoantibody  CPEPL = C-peptide level  :EXT = External Lab  GADLT = COREY-65 autoantibody   INSUL = Insulin level  MCACR (or MALBEXT) = Urine Microalbumin (or External UM)  B12LT = B12 level   Assessment:     Hospital Problems  Date Reviewed: 6/21/2022            Codes Class Noted POA    D[facility], type 1 ([facility] ICD-10-CM: E10.10  ICD-9-CM: 250.13  11/18/2018 Unknown           Plan:     1) DKA >Pt was bridged with 18 units of lantus 10/20/2022, he continues to have a poor appetite. Plan is as follows to prevent recurrent hypoglycemia and until his appetite improves (changes made are underlined): On Hemodialysis days (MWF)  Lantus = 8 units daily   Humalog/Novolog = 1 units before each meal and low dose sliding scale, 1 unit for every 50 above 150  -200 take 1 extra unit  -250 take 2 extra units  -300 take 3 extra units   -350 take 4 extra units   -400 take 5 extra units  -450 take 6 extra units     On Non-hemodialysis days (TTSS)  Lantus = 10 units daily  Humalog/Novolog = 1 units before each meal and low dose sliding scale, 1 unit for every 50 above 150  -200 take 1 extra unit  -250 take 2 extra units  -300 take 3 extra units   -350 take 4 extra units   -400 take 5 extra units  -450 take 6 extra units     I will continue to monitor his BGs while in the hospital with the plan of him going home with the Lantus/Novolog  (basal-bolus) regimen. His upcoming appt with Dr Michelle Klein is on 10/28/2022 and I have updated her with plan that we will discontinue his insulin pump for now.     Thank you very much for the consult.        Signed By: Yesy Oconnell MD     October 24, 2022

## 2022-10-24 NOTE — PROGRESS NOTES
Nephrology Progress Note  [facility] / [address], [place], [address]  Phone - [phone]  Fax - [phone]                 Patient: Yajaira Taylor                   YOB: 1980        Date- 10/24/2022                      Admit Date: 10/16/2022  CC: Follow up for ESRD          IMPRESSION & PLAN:   ESRD- hd mwf at [facility]  DKA  Hyponatremia  Elevated troponins  H/o Urinary retention requiring hansen cath  Type 1 diabetes  Hypertension  Anemia    PLAN-  Plan for hemodialysis today. Hemoglobin stable no need for Epogen  Next HD is going to be Wednesday     Subjective: Interval History:   -Seen and examined on HD    Objective:   Vitals:    10/24/22 1015 10/24/22 1030 10/24/22 1045 10/24/22 1057   BP: 134/78 (!) 143/90 (!) 158/95 (!) 177/76   Pulse: 74 77 74 93   Resp: 18 18 18 18   Temp:    97.7 °F (36.5 °C)   TempSrc:    Oral   SpO2:       Weight:       Height:          No intake/output data recorded. Last 3 Recorded Weights in this Encounter    10/22/22 1724 10/23/22 0346 10/24/22 0626   Weight: 72.7 kg (160 lb 4.4 oz) 72.2 kg (159 lb 2.8 oz) 76.2 kg (167 lb 15.9 oz)        Physical exam:    GEN: NAD  NECK- no mass  RESP: No wheezing, decreased BS b/l  CVS: S1,S2  RRR  NEURO: Normal speech, Non focal  EXT: No Edema       Chart reviewed. Pertinent Notes reviewed. Data Review :  Recent Labs     10/24/22  0736 10/22/22  0212   * 136   K 4.1 3.5    101   CO2 28 30   BUN 25* 10   CREA 4.70* 3.00*   * 83   CA 8.6 8.3*   MG  --  2.0   PHOS 4.1 2.9     Recent Labs     10/24/22  0736 10/22/22  0212   WBC 5.5 4.4   HGB 12.5 12.4   HCT 40.2 41.0    213     No results for input(s): FE, TIBC, PSAT, FERR in the last 72 hours.    Lab Results   Component Value Date/Time    Hemoglobin A1c 7.2 (H) 09/26/2022 09:34 AM    Hemoglobin A1c 8.7 (H) 08/12/2022 12:41 AM    Hemoglobin A1c 12.1 (H) 07/10/2022 07:51 PM      Lab Results   Component Value Date/Time    Microalbumin/Creat ratio (mg/g creat) 11,439 (H) 04/01/2021 10:00 AM    Microalbumin,urine random 151.00 04/01/2021 10:00 AM     Lab Results   Component Value Date/Time    NT pro-BNP 3,665 (H) 10/22/2022 02:12 AM    NT pro-BNP 5,688 (H) 09/06/2022 09:16 AM    NT pro-BNP 11,477 (H) 08/16/2022 12:50 AM    NT pro-BNP 4,600 (H) 08/09/2022 02:13 PM    NT pro- (H) 07/06/2021 09:52 AM     US Results (most recent):  Medication list  reviewed  Current Facility-Administered Medications   Medication Dose Route Frequency    [START ON 10/25/2022] insulin glargine (LANTUS) injection 10 Units  10 Units SubCUTAneous 4 TIMES/WEEK (T,TH,SA,BRAVO)    insulin glargine (LANTUS) injection 8 Units  8 Units SubCUTAneous Q MON, WED & FRI    insulin lispro (HUMALOG) injection 1 Units  1 Units SubCUTAneous TIDAC    heparin (porcine) 1,000 unit/mL injection 1,800 Units  1,800 Units InterCATHeter DIALYSIS PRN    And    heparin (porcine) 1,000 unit/mL injection 1,800 Units  1,800 Units InterCATHeter DIALYSIS PRN    metoclopramide HCl (REGLAN) injection 5 mg  5 mg IntraVENous Q12H PRN    lidocaine 4 % patch 1 Patch  1 Patch TransDERmal Q24H    prochlorperazine (COMPAZINE) injection 10 mg  10 mg IntraVENous Q6H PRN    pantoprazole (PROTONIX) tablet 40 mg  40 mg Oral ACB    hydrALAZINE (APRESOLINE) tablet 50 mg  50 mg Oral TID    sodium chloride (NS) flush 10 mL  10 mL IntraVENous PRN    insulin lispro (HUMALOG) injection   SubCUTAneous AC&HS    glucose chewable tablet 16 g  4 Tablet Oral PRN    glucagon (GLUCAGEN) injection 1 mg  1 mg IntraMUSCular PRN    dextrose 10% infusion 0-250 mL  0-250 mL IntraVENous PRN    cloNIDine (CATAPRES) 0.1 mg/24 hr patch 1 Patch  1 Patch TransDERmal Q7D    carvediloL (COREG) tablet 25 mg  25 mg Oral BID WITH MEALS    aspirin chewable tablet 81 mg  81 mg Oral DAILY    lidocaine (XYLOCAINE) 2 % viscous solution 15 mL  15 mL Mouth/Throat PRN    benzocaine-menthoL (CEPACOL) lozenge 1 Lozenge  1 Lozenge Mucous Membrane PRN    morphine injection 2 mg  2 mg IntraVENous Q3H PRN    amLODIPine (NORVASC) tablet 10 mg  10 mg Oral DAILY    hydrALAZINE (APRESOLINE) 20 mg/mL injection 20 mg  20 mg IntraVENous Q6H PRN    DULoxetine (CYMBALTA) capsule 60 mg  60 mg Oral DAILY    finasteride (PROSCAR) tablet 5 mg  5 mg Oral DAILY    tamsulosin (FLOMAX) capsule 0.4 mg  0.4 mg Oral DAILY    sodium chloride (NS) flush 5-40 mL  5-40 mL IntraVENous Q8H    sodium chloride (NS) flush 5-40 mL  5-40 mL IntraVENous PRN    acetaminophen (TYLENOL) tablet 650 mg  650 mg Oral Q6H PRN    Or    acetaminophen (TYLENOL) suppository 650 mg  650 mg Rectal Q6H PRN    polyethylene glycol (MIRALAX) packet 17 g  17 g Oral DAILY PRN    heparin (porcine) injection 5,000 Units  5,000 Units SubCUTAneous Aishwarya Olivier MD  10/24/2022

## 2022-10-24 NOTE — CONSULTS
PSYCHIATRY CONSULT NOTE    REASON FOR CONSULT: Severe depression      HISTORY OF PRESENTING COMPLAINT:  Jaziel Roland is a 46 y.o. WHITE/NON- male who is currently admitted to the medical floor at [facility]. Patient presented to the ED due to nausea, vomiting and elevated blood sugar. He was subsequently admitted. During this assessment, he was calm and cooperative. Alert and oriented X4. He reports feeling alright and looking forward to discharge. He denies symptoms of depression including sadness, hopelessness, lack of energy and motivation. He also denies anxiety, suicidal/homicidal thoughts and VA hallucinations. He denies sleep and appetite concerns. No delusions or paranoia reported. PAST PSYCHIATRIC HISTORY: Patient reports he saw a psychiatrists years ago. He denies hx of inpatient hospitalization and suicide attempt. He denies family history of mental illness. SUBSTANCE ABUSE HISTORY: Patient reports he smokes weed every now and then. PAST MEDICAL HISTORY:    Please see H&P for details. Past Medical History:   Diagnosis Date    Bipolar 1 disorder, depressed ([address]     Bipolar disorder ([address]     Chronic kidney disease, stage 3a ([address]     Depression     Diabetes ([address]     DKA, type 1 ([address] 1/27/2013    diagnosed age 23    DKA, type 1 ([address]     H/O noncompliance with medical treatment, presenting hazards to health     MRSA (methicillin resistant staph aureus) culture positive     MRSA (methicillin resistant Staphylococcus aureus)     Face    Noncompliance with medication regimen     Second hand smoke exposure     Seizure ([address]     Seizures ([address] 2006 or 2007    one episode during detention     Prior to Admission medications    Medication Sig Start Date End Date Taking? Authorizing Provider   DULoxetine (CYMBALTA) 60 mg capsule Take 1 capsule by mouth once daily 10/6/22   Juan Miguel Guillory MD   naloxone (Narcan) 4 mg/actuation nasal spray Use 1 spray intranasally, then discard.  Repeat with new spray every 2 min as needed for opioid overdose symptoms, alternating nostrils. 9/13/22   Jeffrey Moss MD   rosuvastatin (CRESTOR) 10 mg tablet Take 1 Tablet by mouth nightly for 60 days. 9/12/22 11/11/22  Jeffrey Jose MD   bacitracin zinc (BACITRACIN) ointment Apply  to affected area two (2) times a day. 8/25/22   Jeffrey Moss MD   Ketone Blood Test strp 50 Each by Does Not Apply route as needed for PRN Reason (Other) (as needed for suspected dka). 8/23/22   Jacey White MD   pantoprazole (PROTONIX) 40 mg tablet Take 1 Tablet by mouth Daily (before breakfast). 8/21/22   Ebonie Barber MD   finasteride (PROSCAR) 5 mg tablet Take 1 Tablet by mouth in the morning. 7/25/22   Jeffrey Moss MD   insulin aspart U-100 (NovoLOG U-100 Insulin aspart) 100 unit/mL injection Use as instructed via insulin pump. Dx code E10.65 max daily dose 50U 7/15/22   Jacey White MD   pregabalin (Lyrica) 75 mg capsule Take 1 Capsule by mouth two (2) times a day. Max Daily Amount: 150 mg. 7/14/22   MD Maribeth Irizarry (Ultra-Light Rollator) misc Use while ambulating 7/14/22   Jeffrey Moss MD   amLODIPine (NORVASC) 10 mg tablet Take 1 Tablet by mouth daily. 7/14/22   Jeffrey Moss MD   cloNIDine (CATAPRES) 0.1 mg/24 hr ptwk 1 Patch by TransDERmal route every seven (7) days. 7/14/22   Jeffrey Moss MD   Dexcom G6  misc Use with the dexcom device to check blood sugars 4 times a day 7/1/22   Jacey White MD   Dexcom G6 Sensor brandi Use as directed every 10 days 6/21/22   Jacey White MD   Dexcom G6 Transmitter brandi Use as directed 6/21/22   Jacey White MD   Insulin Needles, Disposable, 31 gauge x 5/16\" ndle Dx code E11.65, use 6x daily 6/21/22   Jacey White MD   carvediloL (COREG) 12.5 mg tablet Take 1 Tablet by mouth two (2) times daily (with meals). 5/19/22   Jeffrey Moss MD   tamsulosin (FLOMAX) 0.4 mg capsule Take 0.4 mg by mouth daily.     Provider, Historical Vitals:    10/24/22 1030 10/24/22 1045 10/24/22 1057 10/24/22 1228   BP: (!) 143/90 (!) 158/95 (!) 177/76 (!) 165/112   Pulse: 77 74 93 85   Resp: 18 18 18 18   Temp:   97.7 °F (36.5 °C) 97.8 °F (36.6 °C)   TempSrc:   Oral    SpO2:    96%   Weight:       Height:         Lab Results   Component Value Date/Time    WBC 5.5 10/24/2022 07:36 AM    Hemoglobin (POC) 18.4 (H) 03/07/2017 09:00 AM    HGB 12.5 10/24/2022 07:36 AM    Hematocrit (POC) 41 10/22/2020 01:07 PM    HCT 40.2 10/24/2022 07:36 AM    PLATELET 429 45/52/6132 07:36 AM    MCV 84.8 10/24/2022 07:36 AM     Lab Results   Component Value Date/Time    Sodium 134 (L) 10/24/2022 07:36 AM    Potassium 4.1 10/24/2022 07:36 AM    Chloride 101 10/24/2022 07:36 AM    CO2 28 10/24/2022 07:36 AM    Anion gap 5 10/24/2022 07:36 AM    Glucose 130 (H) 10/24/2022 07:36 AM    BUN 25 (H) 10/24/2022 07:36 AM    Creatinine 4.70 (H) 10/24/2022 07:36 AM    BUN/Creatinine ratio 5 (L) 10/24/2022 07:36 AM    GFR est AA 24 (L) 09/28/2022 03:15 AM    GFR est non-AA 20 (L) 09/28/2022 03:15 AM    Calcium 8.6 10/24/2022 07:36 AM    Bilirubin, total 0.5 10/18/2022 04:40 AM    Alk. phosphatase 134 (H) 10/18/2022 04:40 AM    Protein, total 5.6 (L) 10/18/2022 04:40 AM    Albumin 2.1 (L) 10/24/2022 07:36 AM    Globulin 3.0 10/18/2022 04:40 AM    A-G Ratio 0.9 (L) 10/18/2022 04:40 AM    ALT (SGPT) 22 10/18/2022 04:40 AM    AST (SGOT) 20 10/18/2022 04:40 AM     No results found for: VALF2, VALAC, VALP, VALPR, DS6, CRBAM, CRBAMP, CARB2, XCRBAM  No results found for: LITHM  RADIOLOGY REPORTS:(reviewed/updated 10/24/2022)  XR CHEST PA LAT    Result Date: 9/27/2022  EXAM:  XR CHEST PA LAT INDICATION: Hyperglycemia COMPARISON: 9/26/2022 at 0836 hours TECHNIQUE: PA and lateral chest views FINDINGS: The right IJ catheter is stable. The cardiomediastinal contours are stable. The pulmonary vasculature is within normal limits. Left basilar opacity and small left pleural effusion have resolved.  There is an increased trace right pleural effusion. There is no pneumothorax. The bones and upper abdomen are stable. Resolved left basilar opacity and small left pleural effusion. Increased trace right pleural effusion. XR ABD (KUB)    Result Date: 9/10/2022  EXAM: KUB INDICATION: n/v A frontal view of the abdomen shows normal small bowel gas pattern. There is mild diffuse colorectal stool without significant colorectal distention. There are no significant calcifications. There is no apparent organomegaly. No acute findings. XR ABD (KUB)    Result Date: 8/13/2022  EXAM: XR ABD (KUB) INDICATION: Diabetic ketoacidosis. COMPARISON: Abdominal plain film of April 2022 and CT 7/10/2022. Carbon Chime FINDINGS: A supine radiograph of the abdomen shows diffuse nondilated gaseous distention of bowel with less normal bowel gas pattern. No soft tissue masses or pathologic calcifications are identified. The bones and soft tissues are within normal limits. Beard catheter projects over the center of the pelvis. Visualized lower chest show bilateral pleural effusions with pulmonary vascular congestion and interstitial prominence. Gaseous distention of bowel without dilation or other acute findings. Small bilateral pleural effusions and suspected congestive failure with pulmonary edema. XR ABD (KUB)    Result Date: 8/12/2022  EXAM:  XR ABD (KUB) INDICATION: Enteric tube placement COMPARISON: CT 7/10/2022. TECHNIQUE: Portable AP supine abdomen view at 0110 hours FINDINGS: The enteric tube terminates in the stomach. There are no dilated bowel loops. The bones are unremarkable. The enteric tube terminates in the stomach. IR INSERT TUNL CVC W/O PORT OVER 5 YR    Result Date: 9/9/2022  CLINICAL DATA: 30-year-old male presents for conversion of existing temporary dialysis catheter to a dual-lumen tunneled dialysis catheter.  DATE: 9/9/2022 : Joann Lundy MD ESTIMATED BLOOD LOSS: Minimal COMPLICATIONS: None SPECIMEN REMOVED: None PROCEDURE SUMMARY: 1. Fluoroscopic guidance for conversion of temporary dialysis catheter to dual-lumen, tunneled dialysis catheter in the right neck. SEDATION: Moderate intravenous conscious sedation was supervised by Dr. Cody Benz. The patient was independently monitored by a registered nurse assigned to the Department of Radiology using automated blood pressure, EKG, and pulse oximetry. The detail conscious sedation record is stored in the hospital information system. Medication: Versed: 2 mg Fentanyl: 25 mcg INTRAPROCEDURE TIME: 20 minutes FLUOROSCOPY TIME: 0.2 min FLUOROSCOPY DOSE (air kerma): 1 mGy PROCEDURE DETAILS: The risks and benefits of the procedure were explained to the patient. Informed written consent was obtained. A timeout was performed to ensure proper patient, site, and procedure. Patient was placed supine on the fluoroscopic table. The right neck and chest was prepped and draped in sterile fashion. Initial  evaluation demonstrated appropriate positioning of the existing right neck temporary dialysis catheter. 0.035 inch wire was advanced through the catheter into the IVC, and the new tunneled catheter was appropriately sized. Next, a tunnel site incision was made over the right chest, and a new 14 Hungarian 23 cm dual-lumen cuffed dialysis catheter was tunneled to the neck site. Serial dilation was then performed, and a valved peel-away sheath was placed. The catheter was then advanced through the peel-away sheath into the central veins under fluoroscopic guidance. The tip of the catheter lies in the right atrium. The catheter aspirated and flushed appropriately, and was blocked with heparin. The catheter was secured to the skin using silk suture. The neck dermatotomy was closed using resorbable suture and Dermabond. The patient tolerated the procedure well, and left the IR suite in stable condition.      1.  Conversion of existing right neck temporary dialysis catheter to a new dual-lumen, tunneled dialysis catheter. The catheter is ready for immediate use. CT HEAD WO CONT    Result Date: 8/14/2022  INDICATION: Slurred speech EXAM: CT HEAD without contrast. TECHNIQUE: Unenhanced CT Head is performed. CT dose reduction was achieved through use of a standardized protocol tailored for this examination and automatic exposure control for dose modulation. FINDINGS: No acute infarct is seen. There is no apparent mass on unenhanced imaging. There is no bleed, shift, obstructive hydrocephalus or significant extra-axial fluid collection. Bone windows are unremarkable. No acute intracranial finding. CT CHEST WO CONT    Result Date: 9/6/2022  EXAM:  CT CHEST WO CONT, CT ABD PELV WO CONT INDICATION: Severe left flank pain, severe left-sided chest pain COMPARISON: Radiograph of the chest from earlier, CT of abdomen and pelvis 5/3/2022 and 7/10/2022 CONTRAST: No oral or IV contrast. The absence of contrast material diminishes the capacity of CT to evaluate the bowel, solid organs, vasculature. TECHNIQUE: Following the uneventful intravenous administration of contrast, thin axial images were obtained through the chest, abdomen and pelvis. Coronal and sagittal reconstructions were generated. CT dose reduction was achieved through use of a standardized protocol tailored for this examination and automatic exposure control for dose modulation. FINDINGS: CHEST WALL: No mass. Bilateral gynecomastia. THYROID: No nodule. MEDIASTINUM: Small right paratracheal, aorticopulmonary and subcarinal lymph nodes. KIN: No obvious mass. THORACIC AORTA: No dissection or aneurysm. MAIN PULMONARY ARTERY: Normal in caliber. TRACHEA/BRONCHI: Patent. ESOPHAGUS: No wall thickening or dilatation. HEART: Cardiac size upper limits of normal. Trace pericardial fluid. PLEURA: Moderate bilateral pleural effusions, larger on the left. LUNGS: Mild interstitial pulmonary edema with septal thickening.  Subsegmental atelectasis prominently shown in basilar lower lobes bilaterally with linear atelectasis in right middle lobe and lingula also demonstrated. LIVER: No mass or biliary dilatation. GALLBLADDER: Unremarkable. SPLEEN: No mass. PANCREAS: No mass or ductal dilatation. ADRENALS: Unremarkable. KIDNEYS: No mass, calculus, or hydronephrosis. STOMACH: Unremarkable. SMALL BOWEL: No dilatation or wall thickening. COLON: No dilatation or wall thickening. APPENDIX: Unremarkable. PERITONEUM: No ascites or pneumoperitoneum. RETROPERITONEUM: No lymphadenopathy or aortic aneurysm. REPRODUCTIVE ORGANS: Those shown appear unremarkable. URINARY BLADDER: There is a Beard catheter within the urinary bladder which is not distended. There is diffuse mural thickening of urinary bladder. A small focus of nondependent intraluminal air is shown though no mural emphysema is demonstrated. BONES: No destructive bone lesion. ADDITIONAL COMMENTS: N/A     1. Moderate bilateral pleural effusions, larger on the left. 2. Mild interstitial pulmonary edema. Bibasilar assessment of atelectasis. 3. Beard catheter within urinary bladder. Moderate diffuse urinary bladder mural thickening. No emphysematous changes. No ureterectasis or renal pelvocaliectasis. CTA CHEST W OR W WO CONT    Result Date: 10/24/2022  EXAM:  CTA CHEST W OR W WO CONT INDICATION: pleuritic chest pain COMPARISON: Chest radiograph 10/22/2022 CONTRAST:  100 mL of Isovue-370. ? Technique: Following the uneventful intravenous administration of contrast, thin axial images were obtained through the chest. Coronal and sagittal reconstructions were generated. MIP image reconstructions were also performed. CT dose reduction was achieved through use of a standardized protocol tailored for this examination and automatic exposure control for dose modulation. ? Findings: Vascular: No evidence of acute or chronic pulmonary embolism. The pulmonary arteries are normal in size.  The thoracic aorta is normal in size. Chest: Lungs/Pleura: Moderate left and small right pleural effusions with overlying bilateral lower lobe subsegmental atelectasis, left worse than right. There is mild diffuse bilateral interlobular septal thickening. No pneumothorax. No suspicious pulmonary nodules. Axilla/Soft Tissue: No pathologic axillary adenopathy. Mediastinum: Right IJ central venous catheter terminates in the right atrium. The esophagus is distended and fluid and air-filled. The heart is normal in size. No pericardial effusion. No pathologic mediastinal or hilar adenopathy. Upper Abdomen: The visualized upper abdomen appears normal. Bones: No evidence of acute fracture, dislocation, or aggressive osseous abnormality. 1. No evidence of pulmonary embolism. 2. Moderate left and small right pleural effusions with overlying bilateral lower lobe subsegmental atelectasis. Mild pulmonary interstitial edema. 3. Distended fluid and air-filled esophagus. CT ABD PELV WO CONT    Result Date: 9/6/2022  EXAM:  CT CHEST WO CONT, CT ABD PELV WO CONT INDICATION: Severe left flank pain, severe left-sided chest pain COMPARISON: Radiograph of the chest from earlier, CT of abdomen and pelvis 5/3/2022 and 7/10/2022 CONTRAST: No oral or IV contrast. The absence of contrast material diminishes the capacity of CT to evaluate the bowel, solid organs, vasculature. TECHNIQUE: Following the uneventful intravenous administration of contrast, thin axial images were obtained through the chest, abdomen and pelvis. Coronal and sagittal reconstructions were generated. CT dose reduction was achieved through use of a standardized protocol tailored for this examination and automatic exposure control for dose modulation. FINDINGS: CHEST WALL: No mass. Bilateral gynecomastia. THYROID: No nodule. MEDIASTINUM: Small right paratracheal, aorticopulmonary and subcarinal lymph nodes. KIN: No obvious mass. THORACIC AORTA: No dissection or aneurysm.  MAIN PULMONARY ARTERY: Normal in caliber. TRACHEA/BRONCHI: Patent. ESOPHAGUS: No wall thickening or dilatation. HEART: Cardiac size upper limits of normal. Trace pericardial fluid. PLEURA: Moderate bilateral pleural effusions, larger on the left. LUNGS: Mild interstitial pulmonary edema with septal thickening. Subsegmental atelectasis prominently shown in basilar lower lobes bilaterally with linear atelectasis in right middle lobe and lingula also demonstrated. LIVER: No mass or biliary dilatation. GALLBLADDER: Unremarkable. SPLEEN: No mass. PANCREAS: No mass or ductal dilatation. ADRENALS: Unremarkable. KIDNEYS: No mass, calculus, or hydronephrosis. STOMACH: Unremarkable. SMALL BOWEL: No dilatation or wall thickening. COLON: No dilatation or wall thickening. APPENDIX: Unremarkable. PERITONEUM: No ascites or pneumoperitoneum. RETROPERITONEUM: No lymphadenopathy or aortic aneurysm. REPRODUCTIVE ORGANS: Those shown appear unremarkable. URINARY BLADDER: There is a Beard catheter within the urinary bladder which is not distended. There is diffuse mural thickening of urinary bladder. A small focus of nondependent intraluminal air is shown though no mural emphysema is demonstrated. BONES: No destructive bone lesion. ADDITIONAL COMMENTS: N/A     1. Moderate bilateral pleural effusions, larger on the left. 2. Mild interstitial pulmonary edema. Bibasilar assessment of atelectasis. 3. Beard catheter within urinary bladder. Moderate diffuse urinary bladder mural thickening. No emphysematous changes. No ureterectasis or renal pelvocaliectasis. CT ABD PELV W CONT    Result Date: 10/17/2022  INDICATION: Perssitent abdominal pain, N/V COMPARISON: 7/10/2022 TECHNIQUE: Thin axial images were obtained through the abdomen and pelvis following intravenous iodinated contrast administration. Coronal and sagittal reconstructions were generated. Oral contrast was not administered.  CT dose reduction was achieved through use of a standardized protocol tailored for this examination and automatic exposure control for dose modulation. FINDINGS: LIVER: No mass or biliary dilatation. GALLBLADDER: No calcified gallstone SPLEEN: Small splenule PANCREAS: No mass or ductal dilatation. ADRENALS: Unremarkable. KIDNEYS/URETERS: Symmetric nephrograms with subcentimeter low-density left renal lesion too small to characterize. No hydronephrosis or hydroureter PERITONEUM: Numerous prominent but nonenlarged retroperitoneal lymph nodes are again seen. No ascites. COLON: Questionable mild diffuse colitis APPENDIX: Unremarkable. SMALL BOWEL: No dilatation or wall thickening. STOMACH: Unremarkable. PELVIS: Trace pelvic free fluid. No pelvic lymphadenopathy. Moderate nonspecific diffuse bladder wall thickening. BONES: No destructive bone lesion. VISUALIZED THORAX: Mild distended distal esophagus with moderate circumferential esophageal wall thickening ADDITIONAL COMMENTS: N/A     1. Questionable mild diffuse colitis. 2. Moderate distal esophagitis. 3. Trace pelvic free fluid. 4. Moderate diffuse bladder wall thickening. US SCROTUM/TESTICLES    Result Date: 8/15/2022  EXAM: US SCROTUM/TESTICLES INDICATION: Pain, swelling. COMPARISON: None. TECHNIQUE: Real-time sonography of the scrotum was performed with a high frequency linear transducer. Multiple static images were obtained. Color Doppler evaluation was also performed. FINDINGS: RIGHT TESTICLE: The right testicle is normal in size and echotexture with normal color-flow. No mass. RIGHT EPIDIDYMIS: The right epididymis is normal. LEFT TESTICLE: The left testicle is normal in size and echotexture with normal color-flow. No mass.   LEFT EPIDIDYMIS: The left epididymis is normal. INGUINAL SOFT TISSUES:  soft tissue edema, no mass or fluid collection     Superficial soft tissue edema groin regions     XR CHEST PORT    Result Date: 10/22/2022  EXAM:  XR CHEST PORT INDICATION: New onset back pain COMPARISON: 10/16/2022 TECHNIQUE: portable chest AP view at 0431 hours FINDINGS: The cardiac silhouette is within normal limits. The pulmonary vasculature is within normal limits. There is dual lumen central line in place with its tip in the right atrium. The lungs and pleural spaces are clear. The visualized bones and upper abdomen are age-appropriate. No acute process on portable chest.     XR CHEST PORT    Result Date: 10/16/2022  EXAM:  XR CHEST PORT INDICATION: Cough COMPARISON: 9/27/2022 TECHNIQUE: Upright portable chest AP view FINDINGS: A double lumen right IJ line is again shown terminating at the cavoatrial junction. The cardiomediastinal and hilar contours are within normal limits. Lungs and pleural margins are clear. The visualized bones and upper abdomen are age-appropriate. No acute findings. XR CHEST PORT    Result Date: 9/26/2022  INDICATION: Nausea, vomiting. Portable AP view of the chest. Direct comparison made to prior chest x-ray dated September 11, 2022. Cardiomediastinal silhouette is stable. Dual-lumen central venous catheter extends to the right atrium. There is left basilar patchy airspace disease and a small left pleural effusion. Right lung is clear. There is no pneumothorax. Left basilar patchy airspace disease and small left pleural effusion. XR CHEST PORT    Result Date: 9/11/2022  Clinical history: pain left flank post thoracocentesis INDICATION:   pain left flank post thoracocentesis COMPARISON: 9/9/2022 FINDINGS: AP portable upright view of the chest demonstrates a stable  cardiopericardial silhouette. Diminished right-sided pleural effusion with dialysis catheter on the right. .There is no focal consolidation. .There is no pneumothorax. . Patient is on a cardiac monitor. Diminished right-sided pleural effusion. No other change. XR CHEST PORT    Result Date: 9/9/2022  EXAM: XR CHEST PORT HISTORY: Pt is in xray recovery bed 1. COMPARISON: 9/8/2022 FINDINGS: Portable AP.  A right IJ dialysis catheter is in place. There are unchanged mild bilateral pleural effusions. There is improved aeration of the left lower lobe. No pneumothorax. The patient has bilateral nipple piercings. 1. Unchanged mild bilateral pleural effusions. 2. Improved aeration of the left lower lobe. XR CHEST PORT    Result Date: 9/9/2022  INDICATION:  assess pleural effusion EXAM: CXR Portable. FINDINGS: Portable chest shows no significant change including CVL since yesterday. There is no apparent pneumothorax. Lungs show no significant change of pleural effusions and associated atelectasis. Heart size is normal. There is no overt pulmonary edema. No significant change. XR CHEST PORT    Result Date: 9/7/2022  Clinical indication: Dialysis catheter placement. AP portable upright view of the chest obtained, comparison September 6. The tip of the Francis catheter is in the right atrium. There is no pneumothorax. Some improvement in pulmonary edema. impression: Francis catheter in place. XR CHEST PORT    Result Date: 9/6/2022  EXAM: XR CHEST PORT INDICATION: Shortness of breath COMPARISON: 8/16/2022 FINDINGS: A portable AP radiograph of the chest was obtained at 095A hours. There is a mild to moderate appearance of interstitial pulmonary edema with small right pleural effusion. Cardiac size is mildly enlarged. Mediastinal contours are within normal limits. Hilar contours are obscured. The bones and soft tissues are grossly within normal limits. Mild-moderate interstitial pulmonary edema and small right pleural effusion. XR CHEST PORT    Result Date: 8/16/2022  EXAM: XR CHEST PORT INDICATION: increased O2 demands COMPARISON: 8/13/2022 FINDINGS: A portable AP radiograph of the chest was obtained at 0158 hours. The patient is on a cardiac monitor. There is mild to moderate pulmonary edema with bilateral pleural effusions, increased in size.      Mild to moderate pulmonary edema with increased bilateral pleural effusions    XR CHEST PORT    Result Date: 8/13/2022  EXAM: XR CHEST PORT INDICATION: hypoxia COMPARISON: Chest x-ray 8/12/2022. FINDINGS: A portable AP radiograph of the chest was obtained at 20:51 hours. The patient is on a cardiac monitor. There is airspace infiltrate in the right upper lobe and probable bilateral pleural effusions with opacities in the lower lobes bilaterally. There is pulmonary vascular congestion and mild interstitial prominence. . The cardiac and mediastinal contours and pulmonary vascularity are normal.  The bones and soft tissues are grossly within normal limits. Consolidative right upper lobe infiltrate suspicious for pneumonia in appropriate clinical setting. Prominent vascularity and interstitial prominence with probable bilateral pleural effusions could reflect congestive failure and edema versus atypical infectious process. XR CHEST PORT    Result Date: 8/12/2022  EXAM:  XR CHEST PORT INDICATION: Intubated COMPARISON: 8/11/2022 at 2335 hours TECHNIQUE: Portable AP semiupright chest view at 0058 hours FINDINGS: The endotracheal tube terminates above the shaw. The enteric tube terminates in the distal esophagus. The cardiomediastinal contours are stable. There are stable bilateral lung opacities. There is no pleural effusion or pneumothorax. The bones and upper abdomen are stable. Satisfactory endotracheal tube placement. The enteric tube terminates in the distal esophagus. Stable bilateral lung opacities. XR CHEST PORT    Result Date: 8/11/2022  EXAM: XR CHEST PORT DATE: 8/11/2022 11:39 PM INDICATION: Eval for Infiltrate COMPARISON: Chest radiograph August 9, 2022 FINDINGS: AP portable chest radiograph. The heart is borderline enlarged. There are bilateral hazy airspace infiltrates. No definite effusion or pneumothorax is seen. No displaced fracture is identified. Interval development of bilateral hazy lung opacities concerning for edema or infection.     XR CHEST PORT    Result Date: 8/9/2022  EXAM: XR CHEST PORT INDICATION: Shortness of breath COMPARISON: None. FINDINGS: A portable AP radiograph of the chest was obtained at 1402 hours. There is blunting of both lateral costophrenic angles. The cardiac and mediastinal contours and pulmonary vascularity are normal.  The bones and soft tissues are grossly within normal limits. Tiny bilateral pleural effusions    IR INSERT NON TUNL CVC OVER 5 YRS    Result Date: 9/7/2022  Clinical Data: A 46year-old patient presents for double-lumen non-tunneled hemodialysis catheter placement at bedside. Date: 9/7/2022 : Nadya Stout M.D. Estimated Blood Loss: Minimal. Specimens Removed: None. Complications: None. Technique: Informed verbal and written consent was obtained following discussion of risks, benefits and alternatives to this procedure. The patient was positioned supine on the bed. The patient's right neck and chest were then prepped and draped in normal sterile fashion. A timeout was performed to ensure proper patient, procedure and site. Ultrasound was used to image the right neck. The right IJ was shown to be patent. A small amount of 1% lidocaine was injected subcutaneously at the site of vascular access. Using real-time ultrasound guidance, the needle was advanced into the vessel. A hard copy image was stored on PACS for documentation. A wire was advanced into the needle, a short incision was made at the puncture site, and serial dilatation was performed. A 14 Irish 20 cm non-tunneled hemodialysis catheter was advanced into the central veins. The catheter flushed and aspirated appropriately. The lumens were blocked with heparin. The catheter was secured at the exit site with silk suture and a sterile dressing. The patient tolerated the procedure well. There were no immediate complications. Successful placement of a double-lumen non-tunneled hemodialysis catheter.     US THORACENTESIS LT NDL W IMAGE    Result Date: 9/9/2022  PROCEDURE: Thoracentesis INDICATION: Pleural effusion OPERATING PHYSICIAN: Maria Hoffman M.D. ESTIMATED BLOOD LOSS: None SPECIMENS REMOVED: 820 cc of pleural fluid COMPLICATIONS: None immediate. Procedure and findings: The risks and benefits of the procedure were discussed with the patient. Written consent was obtained. Preliminary ultrasound imaging of the left chest demonstrated a large pleural effusion. An appropriate site for thoracentesis was marked on the skin. The patient was prepped and draped in sterile fashion. 1% lidocaine was injected locally. A dermatotomy was made. A thoracentesis catheter was then inserted into the pleural space using trocar technique. Approximately 820 ml of yellow fluid was obtained. The catheter was then removed. The patient tolerated the procedure well. There were no immediate complications. Successful ultrasound guided left  thoracentesis yielding approximately 820 ml of fluid. ECHO ADULT COMPLETE    Result Date: 8/14/2022  Formatting of this result is different from the original.   Left Ventricle: Normal left ventricular systolic function with a visually estimated EF of 55 - 60%. Left ventricle size is normal. Normal wall thickness. Normal wall motion. Normal diastolic function. Pericardium: Small (<1 cm) pericardial effusion present. No indication of cardiac tamponade. NUCLEAR CARDIAC STRESS TEST    Result Date: 10/20/2022    ECG: Resting ECG demonstrates normal sinus rhythm. Stress Test: A pharmacological stress test was performed using lexiscan. Blood pressure demonstrated a normal response and heart rate demonstrated a normal response to stress. The patient's heart rate recovery was normal. The patient reported nausea and no chest pain during the stress test.   Non Diagnostic cardiac stress test. Nuclear images to follow. MPI: No ischemia or infarct demonstrated. LVEF 51%.  Rest and Lexiscan myocardial perfusion SPECT imaging is performed with IV injections of 11.0 and 31.4 mCi technetium 99m Sestamibi respectively. SPECT quantitative perfusion analysis and images displayed in three planes demonstrate relatively uniform radiotracer uptake in the myocardium on both sequences. There is no ischemic reversibility. There is no apparent infarct fixed defect. Gated SPECT quantitative analysis and images reviewed in 3 planes exhibit appropriate LV systolic wall thickening. There is no segmental wall motion abnormality of the left ventricular myocardium. The calculated LV ejection fraction is 51%. Pulmonary uptake and left ventricular cavity size appear normal.     : No ischemia or infarct demonstrated. LVEF 51%. No results found for: Christina Leblanc [id number], [id number], [id number], PREGU, POCHCG, MHCGN, HCGQR, THCGA1, SHCG, HCGN, HCGSERUM, HCGURQLPOC    PSYCHOSOCIAL HISTORY: Patient reports he lives with mom, single and has a son. He is unemployed    MENTAL STATUS EXAM:  General appearance: moderately   groomed, psychomotor activity is   Eye contact: good eye contact  Speech: Spontaneous, soft, decreased output. Affect : congruent with mood  Mood: \"alright \"  Thought Process: Logical, goal directed  Perception: Denies AH or VH. Thought Content: denies SI/HI or Plan  Insight: Partial  Judgement: Fair  Cognition: Intact grossly. ASSESSMENT AND PLAN:  Johann Diaz meets criteria for a diagnosis of  mood disorder, unspecified . Patient denies current depression, anxiety, suicidal/homicidal thoughts and AV hallucinations. No further psychiatric intervention is required at this time. Psych admission is not recommended. Thank your your consult. Please feel free to consult us again as needed.

## 2022-10-24 NOTE — PROGRESS NOTES
Hospitalist Progress Note    NAME: Rekha Reddy   :  1980   MRN:  [id number]     Admit date: 10/16/2022    Today's date: 10/24/22    PCP: Jeffrey Moss MD      Anticipated discharge date: 10/25    Barriers:  Hypoglycemia, psych, thoracentesis    Assessment / Plan:    DKA POA Admitted with , HCO3 12 with AG 30  Insulin dependent DM type 1 POA on home insulin pump  Recent DKA from pump malfunction 2022  Admit started on insulin drip per Glucostabilizer protocol              Glycemic control improved and AG normalized  Insulin gtt DC'd  ? Trigger sepsis  Diabetic teaching  Last A1c= 7.2  Po diet as tolerated - diabetic restrictions  Endocrine consulted, plan to transitioned to subQ insulin rather than pump, recs appreciated and ordered. Insulin adjusted as per Endocrine. Glucose has been better controlled  ADA diet  Will need to follow up as outpatient to determine if going back on pump is best for patient  Insulin regimen as per psych. Needed adjustment today due to hypoglycemia    Elevated troponin 17 --> 137 --> 133  Setting of DKA and recurrent N/V  At risk for underlying CAD  ASA  Tele, serial labs  Last echo 2022 with LVEF 55-60%, normal wall motion   Normal RV function  Similar bump with DKA admit 2022, [facility]  Underwent stress test on 10/20 which showed No ischemia or infarct demonstrated. LVEF 51%  Nuclear stress test is negative for ischemia preserved ejection fraction. Sepsis POA WBC 14.8, 106, lactate 1.6, procalcitonin 12.51  Possible UTI POA  CXR with no ASD or edema  CT abdomen/pelvis IMPRESSION  1. Questionable mild diffuse colitis. 2. Moderate distal esophagitis. 3. Trace pelvic free fluid. 4. Moderate diffuse bladder wall thickening.    NS unable to get UA till today, was ordered since admit  UA 80 mg% ketones, 5 to 10 WBC, 5 to 10 RBCs, negative bacteria  blood and urine cultures NGTD  DC empiric ceftriaxone and vancomycin     Recurrent N/V POA  Generalized abdominal pain POA  ? Related to DKA, DKA resolved, but persistent   ? Lagging improvement in the DKA  CT abdomen/pelvis IMPRESSION  1. Questionable mild diffuse colitis. 2. Moderate distal esophagitis. 3. Trace pelvic free fluid. 4. Moderate diffuse bladder wall thickening. Normal lipase  Suspect related to DKA     ESRD POA  Recently started on HD, MWF on 9/7/2022  Nephrology consulted, c/w scheduled HD    Severe esophageal/gastric inflammation POA  Seen on previous EGD  IV PPI     Essential HTN POA  C/w Amlodipine, c/w clonidine patch  Cont coreg  Hydralazine added     Recent urinary retention from prior admit POA hansen removed  Hansen in placed 1 month in august/September 2022  Urology concerned for ?? BPH and atonic bladder per consult  Removed last admit several weeks ago, follows with [facility] u[facility]      Depression  Patient admits to being depressed  Will consult psychiatry, awaiting recs  Has been seen by behavioral health    Pleurisy  CXR WNL  Better with lidocaine patch  Seen by pulm  CTA showed pleural effusion. Thoracentesis has been ordered    Overweight POA Body mass index is 25.54 kg/m². Code status: Full  Prophylaxis: Hep SQ  Recommended Disposition: Home w/Family    Subjective:     Chief Complaint / Reason for Physician Visit  Patient seen and examined at bedside. Patient continues to feel well. Underwent HD this morning. Currently with no n/v. Feels well. Still with pleuritic chest pain. No additional complaints.     Continues to have a lot of pleurisy at the site where he underwent thoracentesis     Review of Systems:  Symptom Y/N Comments  Symptom Y/N Comments   Fever/Chills n   Chest Pain n    Poor Appetite    Edema     Cough n   Abdominal Pain y    Sputum    Joint Pain     SOB/JOHNSTON n   Headache     Nausea/vomit y   Tolerating PT/OT     Diarrhea n   Tolerating Diet n    Constipation    Other       Could NOT obtain due to:      Objective:     VITALS:   Last 24hrs VS reviewed since prior progress note. Most recent are:  Patient Vitals for the past 24 hrs:   Temp Pulse Resp BP SpO2   10/24/22 1228 97.8 °F (36.6 °C) 85 18 (!) 165/112 96 %   10/24/22 1057 97.7 °F (36.5 °C) 93 18 (!) 177/76 --   10/24/22 1045 -- 74 18 (!) 158/95 --   10/24/22 1030 -- 77 18 (!) 143/90 --   10/24/22 1015 -- 74 18 134/78 --   10/24/22 1000 -- 74 18 135/78 --   10/24/22 0945 -- 74 18 (!) 141/78 --   10/24/22 0930 -- 73 18 135/78 --   10/24/22 0915 -- 73 18 127/77 --   10/24/22 0900 -- 74 18 128/73 --   10/24/22 0845 -- 73 18 126/72 --   10/24/22 0830 -- 72 18 114/68 --   10/24/22 0815 -- 70 18 124/69 --   10/24/22 0800 -- 70 18 122/72 --   10/24/22 0745 -- 68 18 (!) 152/84 --   10/24/22 0731 97.7 °F (36.5 °C) 69 18 (!) 174/99 --   10/24/22 0302 -- 69 18 (!) 164/89 91 %   10/23/22 2340 -- 70 16 (!) 145/83 95 %   10/23/22 2125 -- 72 -- (!) 144/85 --   10/23/22 1959 98.4 °F (36.9 °C) 73 18 136/85 94 %   10/23/22 1525 97.9 °F (36.6 °C) 71 18 (!) 153/94 97 %         Intake/Output Summary (Last 24 hours) at 10/24/2022 1345  Last data filed at 10/24/2022 1057  Gross per 24 hour   Intake --   Output 0 ml   Net 0 ml          Wt Readings from Last 12 Encounters:   10/24/22 76.2 kg (167 lb 15.9 oz)   09/28/22 75.8 kg (167 lb 3.2 oz)   09/13/22 72.1 kg (159 lb)   09/06/22 81.6 kg (180 lb)   08/25/22 83.2 kg (183 lb 6.4 oz)   08/17/22 91.4 kg (201 lb 8 oz)   08/09/22 82.7 kg (182 lb 5.1 oz)   07/14/22 73.9 kg (163 lb)   07/10/22 63.5 kg (140 lb)   06/25/22 71.6 kg (157 lb 13.6 oz)   06/21/22 66.5 kg (146 lb 9.6 oz)   06/07/22 72.6 kg (160 lb)       PHYSICAL EXAM:    I had a face to face encounter and independently examined this patient on 10/24/22 as outlined below:    General: WD, WN. Alert, cooperative, actively vomiting  EENT:  PERRL. Anicteric sclerae. MMM  Neck:  No meningismus, no thyromegaly  Resp:  CTA bilaterally, no wheezing or rales.   No accessory muscle use  CV:  Regular  rhythm,  No edema  GI:  Soft, Non distended, generalized tender. +Bowel sounds, no rebound  Neurologic:  Alert and oriented X 3, normal speech, non focal motor exam  Psych:   Not anxious nor agitated  Skin:  No rashes. No jaundice    Reviewed most current lab test results and cultures  YES  Reviewed most current radiology test results   YES  Review and summation of old records today    NO  Reviewed patient's current orders and MAR    YES  PMH/SH reviewed - no change compared to H&P  ________________________________________________________________________  Care Plan discussed with:    Comments   Patient x    Family      RN x    Care Manager     Consultant                        Multidiciplinary team rounds were held today with , nursing, pharmacist and clinical coordinator. Patient's plan of care was discussed; medications were reviewed and discharge planning was addressed. ________________________________________________________________________      Comments   >50% of visit spent in counseling and coordination of care     ________________________________________________________________________  Jaret Hahn MD     Procedures: see electronic medical records for all procedures/Xrays and details which were not copied into this note but were reviewed prior to creation of Plan. LABS:  I reviewed today's most current labs and imaging studies.   Pertinent labs include:  Recent Labs     10/24/22  0736 10/22/22  0212   WBC 5.5 4.4   HGB 12.5 12.4   HCT 40.2 41.0    213       Recent Labs     10/24/22  0736 10/22/22  0212   * 136   K 4.1 3.5    101   CO2 28 30   * 83   BUN 25* 10   CREA 4.70* 3.00*   CA 8.6 8.3*   MG  --  2.0   PHOS 4.1 2.9   ALB 2.1*  --

## 2022-10-24 NOTE — CONSULTS
IMPRESSION:   Left sided CP- atypical in nature, likely pleuritic vs MSK. Low suspicion for PE or PNA  ESRD- HD M/W/F  Prior Lefyt pleural effusion- s/p thoracentesis 9/2022, CXR 10/22 without evidence of recurrence  DKA- resolved  Prior tobacco abuse  H/o HTN      RECOMMENDATIONS/PLAN:   Rely on supplemental o2 as needed to keep Spo2>90%- currently on RA  Holding off on repeat chest ct at this time, CT 9/2022 with Left sided effusion although CXR 10/22 is clear  No indication for thoracentesis given lack of pleural effusions  If CP continues without explanation may consider CTA to rule out PE and any other parenchymal abnormality  Pain control  HD per renal    FULL CODE    Pulm will follow but call with any questions              Subjective/Initial History:   I have reviewed the flowsheet and previous days notes. Seen earlier today on rounds. I was asked by Rebecca Romero MD to see Johann Diaz a 46 y.o.  male  in consultation for a chief complaint of CP.     45 yo M with a history ESRD (HD M/W/F), DM, L effusion (s/p thoracentesis 9/2022), prior tobacco abuse presented initially with hyperglycemia found to be in dka. He was traeaed with insulin gtt and his dka resolved. Pulm was consulted today due to sudden onset left posterior back pain. He underwent L thoracentesis in September and was experiencing a similar discomfort then. CXR 10/22/22 reveals no obvious effusion or any other acute airspace disease. He quit smoking years ago after 15 pack year history but denies any known lung diseases. The patient is unable to give any meaningful history or review of systems due to patient factors.  Excerpts from admission notes as follows:     \"\"    PMH:  has a past medical history of Bipolar 1 disorder, depressed ([address] Bipolar disorder ([address] Chronic kidney disease, stage 3a ([address] Depression, Diabetes ([address] DKA, type 1 ([address] (1/27/2013), DKA, type 1 ([address] H/O noncompliance with medical treatment, presenting hazards to health, MRSA (methicillin resistant staph aureus) culture positive, MRSA (methicillin resistant Staphylococcus aureus), Noncompliance with medication regimen, Second hand smoke exposure, Seizure ([address] and Seizures ([address] (2006 or 2007). PSH:   has a past surgical history that includes hx orthopaedic (Left); hx heent; upper gi endoscopy,biopsy (11/20/2018); ir insert non tunl cvc over 5 yrs (9/7/2022); and ir insert tunl cvc w/o port over 5 yr (9/9/2022). FHX: family history includes Diabetes in his mother and another family member. SHX:  reports that he quit smoking about 2 years ago. His smoking use included cigarettes. He started smoking about 23 years ago. He has a 1.60 pack-year smoking history. He has never used smokeless tobacco. He reports that he does not drink alcohol and does not use drugs. ROS:A comprehensive review of systems was negative except for that written in the HPI.     No Known Allergies     MAR reviewed and pertinent medications noted or modified as needed  MEDS:   Current Facility-Administered Medications   Medication    metoclopramide HCl (REGLAN) injection 5 mg    insulin glargine (LANTUS) injection 10 Units    insulin lispro (HUMALOG) injection 2 Units    insulin glargine (LANTUS) injection 12 Units    lidocaine 4 % patch 1 Patch    prochlorperazine (COMPAZINE) injection 10 mg    pantoprazole (PROTONIX) tablet 40 mg    hydrALAZINE (APRESOLINE) tablet 50 mg    sodium chloride (NS) flush 10 mL    insulin lispro (HUMALOG) injection    glucose chewable tablet 16 g    glucagon (GLUCAGEN) injection 1 mg    dextrose 10% infusion 0-250 mL    cloNIDine (CATAPRES) 0.1 mg/24 hr patch 1 Patch    carvediloL (COREG) tablet 25 mg    aspirin chewable tablet 81 mg    lidocaine (XYLOCAINE) 2 % viscous solution 15 mL    benzocaine-menthoL (CEPACOL) lozenge 1 Lozenge    morphine injection 2 mg    amLODIPine (NORVASC) tablet 10 mg    hydrALAZINE (APRESOLINE) 20 mg/mL injection 20 mg    DULoxetine (CYMBALTA) capsule 60 mg    finasteride (PROSCAR) tablet 5 mg    tamsulosin (FLOMAX) capsule 0.4 mg    sodium chloride (NS) flush 5-40 mL    sodium chloride (NS) flush 5-40 mL    acetaminophen (TYLENOL) tablet 650 mg    Or    acetaminophen (TYLENOL) suppository 650 mg    polyethylene glycol (MIRALAX) packet 17 g    heparin (porcine) injection 5,000 Units        Objective:     Vital Signs: Telemetry:    normal sinus rhythm Intake/Output:   Visit Vitals  BP (!) 145/83 (BP 1 Location: Right upper arm, BP Patient Position: Lying right side)   Pulse 70   Temp 98.4 °F (36.9 °C)   Resp 16   Ht 5' 8\" (1.727 m)   Wt 72.2 kg (159 lb 2.8 oz)   SpO2 95%   BMI 24.20 kg/m²       Temp (24hrs), Av °F (36.7 °C), Min:97.7 °F (36.5 °C), Max:98.4 °F (36.9 °C)        O2 Device: None (Room air)         Wt Readings from Last 4 Encounters:   10/23/22 72.2 kg (159 lb 2.8 oz)   22 75.8 kg (167 lb 3.2 oz)   22 72.1 kg (159 lb)   22 81.6 kg (180 lb)        No intake or output data in the 24 hours ending 10/24/22 0103    Last shift:      No intake/output data recorded. Last 3 shifts: 10/22 0701 - 10/23 1900  In: 680 [P.O.:680]  Out: -      Hemodynamics:    CO:    CI:    CVP:    SVR:   PAP Systolic:    PAP Diastolic:    PVR:    MF94:        Ventilator Settings:      Mode Rate TV Press PEEP FiO2 PIP Min. Vent                            Physical Exam:    Gen: no distress  HEENT: poor dentition  Cardiac: no murmurs  Lungs: clear b/l , Point tenderness along left posterior chest wall  Abd: non tender  Neuro: aoo3    Data:   Interval lab and diagnostic data was reviewed. Interval radiology images were independently viewed and available reports were reviewed. All lab results for the last 24 hours reviewed.           Labs:    Recent Labs     10/22/22  0212   WBC 4.4   HGB 12.4        Recent Labs     10/22/22  0212 10/21/22  0210    134*   K 3.5 3.1*    98   CO2 30 28   GLU 83 44*   BUN 10 21*   CREA 3.00* 4.15*   CA 8.3* 8.0*   MG 2.0 2.1   PHOS 2.9  --      No results for input(s): PH, PCO2, PO2, HCO3, FIO2 in the last 72 hours. No results for input(s): CPK, CKNDX, TROIQ in the last 72 hours. No lab exists for component: CPKMB  No results found for: BNPP, BNP   Lab Results   Component Value Date/Time    Culture result: No growth (<1,000 CFU/ML) 10/18/2022 10:50 AM    Culture result: NO GROWTH 5 DAYS 10/18/2022 10:38 AM    Culture result: NO GROWTH 6 DAYS 09/26/2022 09:30 AM     Lab Results   Component Value Date/Time    TSH 4.30 (H) 08/12/2022 10:19 AM        Imaging:  I have personally reviewed the patients radiographs and have reviewed the reports:        Results from Hospital Encounter encounter on 10/16/22    XR CHEST PORT    Narrative  EXAM:  XR CHEST PORT    INDICATION: New onset back pain    COMPARISON: 10/16/2022    TECHNIQUE: portable chest AP view at 0431 hours    FINDINGS: The cardiac silhouette is within normal limits. The pulmonary  vasculature is within normal limits. There is dual lumen central line in place  with its tip in the right atrium. The lungs and pleural spaces are clear. The visualized bones and upper abdomen  are age-appropriate. Impression  No acute process on portable chest.      Results from Hospital Encounter encounter on 10/16/22    CT ABD PELV W CONT    Narrative  INDICATION: Perssitent abdominal pain, N/V    COMPARISON: 7/10/2022    TECHNIQUE:  Thin axial images were obtained through the abdomen and pelvis following  intravenous iodinated contrast administration. Coronal and sagittal  reconstructions were generated. Oral contrast was not administered. CT dose  reduction was achieved through use of a standardized protocol tailored for this  examination and automatic exposure control for dose modulation. FINDINGS:    LIVER: No mass or biliary dilatation.   GALLBLADDER: No calcified gallstone  SPLEEN: Small splenule  PANCREAS: No mass or ductal dilatation. ADRENALS: Unremarkable. KIDNEYS/URETERS: Symmetric nephrograms with subcentimeter low-density left renal  lesion too small to characterize. No hydronephrosis or hydroureter  PERITONEUM: Numerous prominent but nonenlarged retroperitoneal lymph nodes are  again seen. No ascites. COLON: Questionable mild diffuse colitis  APPENDIX: Unremarkable. SMALL BOWEL: No dilatation or wall thickening. STOMACH: Unremarkable. PELVIS: Trace pelvic free fluid. No pelvic lymphadenopathy. Moderate nonspecific  diffuse bladder wall thickening. BONES: No destructive bone lesion. VISUALIZED THORAX: Mild distended distal esophagus with moderate circumferential  esophageal wall thickening  ADDITIONAL COMMENTS: N/A    Impression  1. Questionable mild diffuse colitis. 2. Moderate distal esophagitis. 3. Trace pelvic free fluid. 4. Moderate diffuse bladder wall thickening. This care involved high complexity decision making which includes independently reviewing the patient's past medical records, current laboratory results, medication profiles that were immediately available to me and actual Xray images at the bedside in order to assess, support vital system function, and to treat this degree of vital organ system failure, and to prevent further life threatening deterioration of the patients condition. I was in direct communication with the nursing staff throughout this time. I have personally and independently reviewed the patients interval diagnostic lab data, radiographs and the reports. I have ordered additional labs to follow the current medical conditions and to monitor treatment responses over the next 24 hours or sooner if needed. I will order additional imaging to follow longitudinal changes found on the most current imaging.        Medical Decision Making Today  Reviewed the flowsheet and previous days notes  Reviewed and summarized records or history from previous days note or discussions with staff, family  Parenteral controlled substances - Reviewed/ Adjusted / Lucas Donning / Started  High Risk Drug therapy requiring intensive monitoring for toxicity: eg steroids, pressors, antibiotics  Review and order of Clinical lab tests  Review and Order of Radiology tests  Review and Order of Medicine tests  Independent visualization of radiologic Images  Reviewed Ventilator / NiPPV  I have personally reviewed the patients ECG / Telemetry  Abrupt change in neurologic status  Diagnostic endoscopies with identified risk factors           Thank you for allowing us to participate in the care of this patient. We will be happy to follow along with you.     Corrina Zimmerman, DO

## 2022-10-24 NOTE — PROCEDURES
Hemodialysis / [phone]    Vitals Pre Post Assessment Pre Post   BP BP: (!) 174/99 (10/24/22 0731)   177/76   LOC A&Ox4, withdrawn A&Ox4, withdrawn   HR Pulse (Heart Rate): 69 (10/24/22 0731) 93 Lungs clear clear   Resp Resp Rate: 18 (10/24/22 0731) 18 Cardiac RRR ( NSR per report) RRR   Temp Temp: 97.7 °F (36.5 °C) (10/24/22 0731) 97.7 Skin intact intact   Weight  N/a N/a Edema Trace BLE Trace BLE   Tele status Remote tele Remote tele Pain 0 0     Orders   Duration: Start: 3335 End: 6995 Total: 3.5 hours   Dialyzer: Dialyzer/Set Up Inspection: Paulina Silvana (10/24/22 0731)   K Bath: Dialysate K (mEq/L): 4 (10/24/22 0731)   Ca Bath: Dialysate CA (mEq/L): 3.0 (10/24/22 0731)   Na: Dialysate NA (mEq/L): 139 (10/24/22 0731)   Bicarb: Dialysate HCO3 (mEq/L): 38 (10/24/22 0731)   Target Fluid Removal: Goal/Amount of Fluid to Remove (mL): 0 mL (10/24/22 0731)     Access   Type & Location: R PC : Pre- Assessment: Dressing CDI and dated 10/21/22. No s/s of infection. Both lumens aspirate & flush well. Running well at . Post assessment: Dressing CDI. No changes.     Comments:                                        Labs   HBsAg (Antigen) / date: Negative 10/17/22                                            HBsAb (Antibody) / date: Susceptible 10/17/22   Source: Epic   Obtained/Reviewed  Critical Results Called HGB   Date Value Ref Range Status   10/24/2022 12.5 12.1 - 17.0 g/dL Final     Potassium   Date Value Ref Range Status   10/24/2022 4.1 3.5 - 5.1 mmol/L Final     Calcium   Date Value Ref Range Status   10/24/2022 8.6 8.5 - 10.1 MG/DL Final     BUN   Date Value Ref Range Status   10/24/2022 25 (H) 6 - 20 MG/DL Final     Creatinine   Date Value Ref Range Status   10/24/2022 4.70 (H) 0.70 - 1.30 MG/DL Final     Comment:     INVESTIGATED PER DELTA CHECK PROTOCOL        Meds Given   Name Dose Route   heparin 1800 units and 1800 units CVC dwell post dialysis               Adequacy / Fluid    Total Liters Process: 74.6L   Net Fluid Removed: 0ml      Comments   Time Out Done:   (Time) 0730   Admitting Diagnosis: DKA   Consent obtained/signed: Informed Consent Verified: Yes (chronic dialysis, no consent needed) (10/24/22 1679)   Machine / RO # Machine Number: [id number] (10/24/22 7008)   Primary Nurse Rpt Pre: Walker Garcia RN   Primary Nurse Rpt Post: Brittany Antonio RN   Pt Education: Procedure   Care Plan: Ongoing   Pts outpatient clinic: [facility] Summary   Comments:    SBAR received from Primary RN. Pt arrived to HD suite A&Ox4. Chronic consent applies. Pt agreeable to treatment. 2609: Each catheter limb disinfected per p&p, caps removed, hubs disinfected per p&p. Each lumen aspirated for blood return and flushed with Normal Saline per policy. Labs drawn per order. VSS. Dialysis Tx initiated. **Patient tolerated treatment well without complaints or complications. All lines and connections remained intact and visible throughout entire treatment. 1057: Tx ended. VSS. Each dialysis catheter limb disinfected per p&p, all possible blood returned per p&p, and each dialysis hub disinfected per p&p. Each lumen flushed, post dialysis catheter Heparin dwell instilled per order, and caps applied. Bed locked and in the lowest position, call bell and belongings in reach. SBAR given to Primary, ASAD. Patient is stable at time of their departure. All Dialysis related medications have been reviewed.

## 2022-10-24 NOTE — BH NOTES
Attempted to consult with patient and per nurse the computer does not work and she has to go look for another computer. Will try again in 30 minutes.

## 2022-10-25 ENCOUNTER — APPOINTMENT (OUTPATIENT)
Dept: NON INVASIVE DIAGNOSTICS | Age: 42
DRG: 420 | End: 2022-10-25
Attending: INTERNAL MEDICINE
Payer: MEDICAID

## 2022-10-25 ENCOUNTER — APPOINTMENT (OUTPATIENT)
Dept: ULTRASOUND IMAGING | Age: 42
DRG: 420 | End: 2022-10-25
Attending: INTERNAL MEDICINE
Payer: MEDICAID

## 2022-10-25 ENCOUNTER — VIRTUAL VISIT (OUTPATIENT)
Dept: DIABETES SERVICES | Age: 42
End: 2022-10-25

## 2022-10-25 VITALS
HEIGHT: 68 IN | DIASTOLIC BLOOD PRESSURE: 84 MMHG | BODY MASS INDEX: 23.12 KG/M2 | TEMPERATURE: 98.4 F | OXYGEN SATURATION: 96 % | HEART RATE: 76 BPM | RESPIRATION RATE: 18 BRPM | WEIGHT: 152.56 LBS | SYSTOLIC BLOOD PRESSURE: 158 MMHG

## 2022-10-25 DIAGNOSIS — E10.65 HYPERGLYCEMIA DUE TO TYPE 1 DIABETES MELLITUS (HCC): Primary | ICD-10-CM

## 2022-10-25 LAB
GLUCOSE BLD STRIP.AUTO-MCNC: 133 MG/DL (ref 65–117)
GLUCOSE BLD STRIP.AUTO-MCNC: 274 MG/DL (ref 65–117)
GLUCOSE BLD STRIP.AUTO-MCNC: 275 MG/DL (ref 65–117)
GLUCOSE BLD STRIP.AUTO-MCNC: 280 MG/DL (ref 65–117)
SERVICE CMNT-IMP: ABNORMAL

## 2022-10-25 PROCEDURE — 74011250637 HC RX REV CODE- 250/637: Performed by: INTERNAL MEDICINE

## 2022-10-25 PROCEDURE — 74011000250 HC RX REV CODE- 250: Performed by: INTERNAL MEDICINE

## 2022-10-25 PROCEDURE — 76604 US EXAM CHEST: CPT

## 2022-10-25 PROCEDURE — 93306 TTE W/DOPPLER COMPLETE: CPT

## 2022-10-25 PROCEDURE — 74011636637 HC RX REV CODE- 636/637: Performed by: GENERAL ACUTE CARE HOSPITAL

## 2022-10-25 PROCEDURE — 74011250637 HC RX REV CODE- 250/637: Performed by: NURSE PRACTITIONER

## 2022-10-25 PROCEDURE — 82962 GLUCOSE BLOOD TEST: CPT

## 2022-10-25 PROCEDURE — 74011250636 HC RX REV CODE- 250/636: Performed by: INTERNAL MEDICINE

## 2022-10-25 RX ORDER — INSULIN GLARGINE 100 [IU]/ML
10 INJECTION, SOLUTION SUBCUTANEOUS
Qty: 1 ML | Refills: 0 | Status: SHIPPED | OUTPATIENT
Start: 2022-10-25 | End: 2022-10-28

## 2022-10-25 RX ORDER — LIDOCAINE HYDROCHLORIDE 10 MG/ML
10 INJECTION, SOLUTION EPIDURAL; INFILTRATION; INTRACAUDAL; PERINEURAL
Status: DISCONTINUED | OUTPATIENT
Start: 2022-10-25 | End: 2022-10-25 | Stop reason: HOSPADM

## 2022-10-25 RX ORDER — INSULIN LISPRO 100 [IU]/ML
1 INJECTION, SOLUTION INTRAVENOUS; SUBCUTANEOUS
Qty: 1 EACH | Refills: 0 | Status: SHIPPED | OUTPATIENT
Start: 2022-10-25 | End: 2022-10-28

## 2022-10-25 RX ORDER — HYDRALAZINE HYDROCHLORIDE 50 MG/1
50 TABLET, FILM COATED ORAL 3 TIMES DAILY
Qty: 90 TABLET | Refills: 0 | Status: SHIPPED | OUTPATIENT
Start: 2022-10-25 | End: 2022-11-02 | Stop reason: DRUGHIGH

## 2022-10-25 RX ORDER — LIDOCAINE HYDROCHLORIDE AND EPINEPHRINE 10; 10 MG/ML; UG/ML
15 INJECTION, SOLUTION INFILTRATION; PERINEURAL ONCE
Status: DISCONTINUED | OUTPATIENT
Start: 2022-10-25 | End: 2022-10-25

## 2022-10-25 RX ORDER — INSULIN GLARGINE 100 [IU]/ML
8 INJECTION, SOLUTION SUBCUTANEOUS
Qty: 1 ML | Refills: 0 | Status: SHIPPED | OUTPATIENT
Start: 2022-10-26 | End: 2022-10-28

## 2022-10-25 RX ORDER — LIDOCAINE HYDROCHLORIDE 10 MG/ML
15 INJECTION, SOLUTION EPIDURAL; INFILTRATION; INTRACAUDAL; PERINEURAL
Status: DISCONTINUED | OUTPATIENT
Start: 2022-10-25 | End: 2022-10-25 | Stop reason: HOSPADM

## 2022-10-25 RX ORDER — LIDOCAINE HYDROCHLORIDE AND EPINEPHRINE 10; 10 MG/ML; UG/ML
150 INJECTION, SOLUTION INFILTRATION; PERINEURAL ONCE
Status: DISCONTINUED | OUTPATIENT
Start: 2022-10-25 | End: 2022-10-25 | Stop reason: HOSPADM

## 2022-10-25 RX ADMIN — AMLODIPINE BESYLATE 10 MG: 5 TABLET ORAL at 08:38

## 2022-10-25 RX ADMIN — CARVEDILOL 25 MG: 12.5 TABLET, FILM COATED ORAL at 08:38

## 2022-10-25 RX ADMIN — HYDRALAZINE HYDROCHLORIDE 50 MG: 50 TABLET, FILM COATED ORAL at 08:38

## 2022-10-25 RX ADMIN — MORPHINE SULFATE 2 MG: 2 INJECTION, SOLUTION INTRAMUSCULAR; INTRAVENOUS at 08:39

## 2022-10-25 RX ADMIN — Medication 3 UNITS: at 12:13

## 2022-10-25 RX ADMIN — MORPHINE SULFATE 2 MG: 2 INJECTION, SOLUTION INTRAMUSCULAR; INTRAVENOUS at 15:48

## 2022-10-25 RX ADMIN — INSULIN GLARGINE 10 UNITS: 100 INJECTION, SOLUTION SUBCUTANEOUS at 08:38

## 2022-10-25 RX ADMIN — PANTOPRAZOLE SODIUM 40 MG: 40 TABLET, DELAYED RELEASE ORAL at 08:38

## 2022-10-25 RX ADMIN — MORPHINE SULFATE 2 MG: 2 INJECTION, SOLUTION INTRAMUSCULAR; INTRAVENOUS at 11:56

## 2022-10-25 RX ADMIN — DULOXETINE HYDROCHLORIDE 60 MG: 30 CAPSULE, DELAYED RELEASE ORAL at 08:38

## 2022-10-25 RX ADMIN — SODIUM CHLORIDE, PRESERVATIVE FREE 10 ML: 5 INJECTION INTRAVENOUS at 11:58

## 2022-10-25 RX ADMIN — TAMSULOSIN HYDROCHLORIDE 0.4 MG: 0.4 CAPSULE ORAL at 08:38

## 2022-10-25 RX ADMIN — FINASTERIDE 5 MG: 5 TABLET, FILM COATED ORAL at 08:38

## 2022-10-25 RX ADMIN — Medication 1 UNITS: at 12:13

## 2022-10-25 RX ADMIN — MORPHINE SULFATE 2 MG: 2 INJECTION, SOLUTION INTRAMUSCULAR; INTRAVENOUS at 03:29

## 2022-10-25 RX ADMIN — HYDRALAZINE HYDROCHLORIDE 20 MG: 20 INJECTION INTRAMUSCULAR; INTRAVENOUS at 04:49

## 2022-10-25 RX ADMIN — Medication 1 UNITS: at 08:39

## 2022-10-25 RX ADMIN — Medication 3 UNITS: at 08:39

## 2022-10-25 RX ADMIN — ASPIRIN 81 MG CHEWABLE TABLET 81 MG: 81 TABLET CHEWABLE at 08:38

## 2022-10-25 RX ADMIN — HYDRALAZINE HYDROCHLORIDE 50 MG: 50 TABLET, FILM COATED ORAL at 15:48

## 2022-10-25 RX ADMIN — CARVEDILOL 25 MG: 12.5 TABLET, FILM COATED ORAL at 17:23

## 2022-10-25 NOTE — PROGRESS NOTES
Spiritual Care Assessment/Progress Note  [facility]      NAME: Yas Aguilar      MRN: [id number]  AGE: 46 y.o.  SEX: male  Confucianist Affiliation: Baptist   Language: English     10/25/2022     Total Time (in minutes): 18     Spiritual Assessment begun in [facility] PROGRESSIVE CARE through conversation with:         [x]Patient        [x] Family    [] Friend(s)        Reason for Consult: Initial/Spiritual assessment, patient floor     Spiritual beliefs: (Please include comment if needed)     [x] Identifies with a keerthi tradition:         [] Supported by a keerthi community:            [] Claims no spiritual orientation:           [] Seeking spiritual identity:                [] Adheres to an individual form of spirituality:           [] Not able to assess:                           Identified resources for coping:      [] Prayer                               [] Music                  [] Guided Imagery     [x] Family/friends                 [] Pet visits     [] Devotional reading                         [] Unknown     [] Other:                                                Interventions offered during this visit: (See comments for more details)    Patient Interventions: Coping skills reviewed/reinforced, Life review/legacy, Initial/Spiritual assessment, patient floor, Normalization of emotional/spiritual concerns, Prayer (assurance of), Confucianist beliefs/image of God discussed, Integration of medical assessment with existing values and beliefs, Affirmation of keerthi, Catharsis/review of pertinent events in supportive environment, Iconic (affirming the presence of God/Higher Power)     Family/Friend(s): Initial Assessment, Normalization of emotional/spiritual concerns     Plan of Care:     [] Support spiritual and/or cultural needs    [] Support AMD and/or advance care planning process      [] Support grieving process   [] Coordinate Rites and/or Rituals    [] Coordination with community clergy   [] No spiritual needs identified at this time   [] Detailed Plan of Care below (See Comments)  [] Make referral to Music Therapy  [] Make referral to Pet Therapy     [] Make referral to Addiction services  [] Make referral to [facility]  [] Make referral to Spiritual Care Partner  [x] No future visits requested        [] Contact Spiritual Care for further referrals     Comments:  Visit was for initial spiritual assessment in . Patient was in bed and seemed comfortable. Hi mother was present visiting. Patient shared what led to his hospitalization, indicating he contributed to his current condition because he did not take his medical condition seriously from the unset. He has a supportive family. His father  about  year ago, but he has his mother ans children. He shared about his son who he indicated has multiple skills and intends studding nursing. Ольга is very important to patient, though he is not baptized yet.  listened with affirmation and empathy. Assurance of prayer offered, patient and mom thanked  for the visit.        Visited by: Baldev austin : Forest Aceves  ([id number])

## 2022-10-25 NOTE — PROGRESS NOTES
Comprehensive Nutrition Assessment    Type and Reason for Visit: Initial, RD nutrition re-screen/LOS    Nutrition Recommendations/Plan:   4 carb choice/low potassium diet      Malnutrition Assessment:  Malnutrition Status:  No malnutrition (10/25/22 1109)      Nutrition Assessment:    Patient medically noted for DKA, nausea/vomiting, and pleural effusion. PMH DM, ESRD on HD, HTN, and depression. Chart reviewed for length of stay. Patient reports a good appetite and eating well. Nausea/vomiting has resolved. Complains of not getting enough food and still being hungry after meals. Declines supplements. Will increase to 4 carb choices with meals. Provided menu to help with meal selections. Patient states his weight has been stable. Last potassium and phos WNL. Plans for potential discharge this afternoon. Nutrition Related Findings:    Na 134, -[id number], K+ 4.1, Phos 4.1   Norvasc, Coreg, Cymbalta, Hydralazine, Lantus, Humalog, Protonix   Wound Type: None    Current Nutrition Intake & Therapies:  Average Meal Intake: 51-75%  Average Supplement Intake: None ordered  ADULT DIET Regular; 3 carb choices (45 gm/meal); Low Potassium (Less than 3000 mg/day)  DIET ONE TIME MESSAGE    Anthropometric Measures:  Height: 5' 8\" (172.7 cm)  Ideal Body Weight (IBW): 154 lbs (70 kg)     Current Body Wt:  69.2 kg (152 lb 8.9 oz), 99.1 % IBW. Current BMI (kg/m2): 23.2                          BMI Category: Normal weight (BMI 18.5-24. 9)    Estimated Daily Nutrient Needs:  Energy Requirements Based On: Formula  Weight Used for Energy Requirements: Current  Energy (kcal/day): 2053 kcals (BMR 1580 x 1. 3AF)  Weight Used for Protein Requirements: Current  Protein (g/day): 90g (1.3 g/kg bw)  Method Used for Fluid Requirements: Standard renal  Fluid (ml/day): 1800 mL or per MD    Nutrition Diagnosis:   No nutrition diagnosis at this time       Nutrition Interventions:   Food and/or Nutrient Delivery: Modify current diet  Nutrition Education/Counseling: No recommendations at this time  Coordination of Nutrition Care: Continue to monitor while inpatient       Goals:     Goals: PO intake 75% or greater, by next RD assessment       Nutrition Monitoring and Evaluation:   Behavioral-Environmental Outcomes: None identified  Food/Nutrient Intake Outcomes: Food and nutrient intake  Physical Signs/Symptoms Outcomes: Weight, Biochemical data    Discharge Planning:    Continue current diet    Linette Harper RD  Contact: ext [phone]

## 2022-10-25 NOTE — ROUTINE PROCESS
Pt transferred to unit via stretcher accomp. By transport. Pt awake, alert and oriented x 3. Pt c/o pain left side back \"that one spot\". Pt c/o shortness of breath since last Monday per pt.  Upon arrival to hospital.

## 2022-10-25 NOTE — PROGRESS NOTES
Pt requesting water - pt given 1 cup of water to drink and tolerated without difficulty. Pauly spoke with pt. After reviewing images with Dr. Yolis Castro to inform pt. \"Not enough fluid to drain\". Pt in transport to go back to his inpt room.

## 2022-10-25 NOTE — PROGRESS NOTES
End of Shift Note    Bedside shift change report given to Mark KAMARA (oncoming nurse) by Bong Lucas RN (offgoing nurse). Report included the following information SBAR, Kardex, Intake/Output, MAR, Recent Results, and Cardiac Rhythm NSR    Shift worked:  7p-7a     Shift summary and any significant changes:     Back pain, asking for morphine frequently. Educated on need to wean from narcotic. Held heparin this AM for thoracetesis     Concerns for physician to address:       Zone phone for oncoming shift:   [phone]       Activity:  Activity Level: Bath Room Privileges  Number times ambulated in hallways past shift: 0  Number of times OOB to chair past shift: 0    Cardiac:   Cardiac Monitoring: Yes      Cardiac Rhythm: Sinus Rhythm    Access:  Current line(s): PIV and HD access     Genitourinary:   Urinary status: oliguric    Respiratory:   O2 Device: None (Room air)  Chronic home O2 use?: NO  Incentive spirometer at bedside: NO       GI:  Last Bowel Movement Date: 10/16/22  Current diet:  ADULT DIET Regular; 3 carb choices (45 gm/meal); Low Potassium (Less than 3000 mg/day)  DIET ONE TIME MESSAGE  Passing flatus: YES  Tolerating current diet: YES       Pain Management:   Patient states pain is manageable on current regimen: NO    Skin:  Corey Score: 20  Interventions: increase time out of bed and nutritional support     Patient Safety:  Fall Score:  Total Score: 2  Interventions: bed/chair alarm, assistive device (walker, cane, etc), gripper socks, pt to call before getting OOB, and stay with me (per policy)  High Fall Risk: Yes    Length of Stay:  Expected LOS: 3d 19h  Actual LOS: 9      Bong Lucas, RN

## 2022-10-25 NOTE — PROGRESS NOTES
Nephrology Progress Note  [facility] / [address], [address]  [place], [address]  Phone - [phone]  Fax - [phone]                 Patient: Miguel Mcfadden                   YOB: 1980        Date- 10/25/2022                      Admit Date: 10/16/2022  CC: Follow up for ESRD          IMPRESSION & PLAN:   ESRD- hd mwf at [facility]  DKA  Hyponatremia  Elevated troponins  H/o Urinary retention requiring hansen cath  Type 1 diabetes  Hypertension  Anemia  Bilateral pleural effusion    PLAN-  Plan for hemodialysis tomorrow with 2-3 liter ultrafiltration  Hemoglobin stable no need for Epogen       Subjective: Interval History:   -Seen and examined   -Went down to IR today for thoracocentesis. Objective:   Vitals:    10/25/22 0746 10/25/22 0833 10/25/22 1105 10/25/22 1116   BP: (!) 168/77 (!) 165/84  137/80   Pulse: 83 88  79   Resp: 20 20  18   Temp: 98.6 °F (37 °C) 98.5 °F (36.9 °C)  98.6 °F (37 °C)   TempSrc:       SpO2: 92% 94%  92%   Weight:       Height:   5' 8\" (1.727 m)       No intake/output data recorded. Last 3 Recorded Weights in this Encounter    10/23/22 0346 10/24/22 0626 10/25/22 0634   Weight: 72.2 kg (159 lb 2.8 oz) 76.2 kg (167 lb 15.9 oz) 69.2 kg (152 lb 8.9 oz)        Physical exam:    GEN: NAD  NECK- no mass  RESP: No wheezing, decreased BS b/l  CVS: S1,S2  RRR  NEURO: Normal speech, Non focal  EXT: No Edema       Chart reviewed. Pertinent Notes reviewed. Data Review :  Recent Labs     10/24/22  0736   *   K 4.1      CO2 28   BUN 25*   CREA 4.70*   *   CA 8.6   PHOS 4.1       Recent Labs     10/24/22  0736   WBC 5.5   HGB 12.5   HCT 40.2          No results for input(s): FE, TIBC, PSAT, FERR in the last 72 hours.    Lab Results   Component Value Date/Time    Hemoglobin A1c 7.2 (H) 09/26/2022 09:34 AM    Hemoglobin A1c 8.7 (H) 08/12/2022 12:41 AM Hemoglobin A1c 12.1 (H) 07/10/2022 07:51 PM        Lab Results   Component Value Date/Time    Microalbumin/Creat ratio (mg/g creat) 11,439 (H) 04/01/2021 10:00 AM    Microalbumin,urine random 151.00 04/01/2021 10:00 AM     Lab Results   Component Value Date/Time    NT pro-BNP 3,665 (H) 10/22/2022 02:12 AM    NT pro-BNP 5,688 (H) 09/06/2022 09:16 AM    NT pro-BNP 11,477 (H) 08/16/2022 12:50 AM    NT pro-BNP 4,600 (H) 08/09/2022 02:13 PM    NT pro- (H) 07/06/2021 09:52 AM     US Results (most recent):  Medication list  reviewed  Current Facility-Administered Medications   Medication Dose Route Frequency    lidocaine (PF) (XYLOCAINE) 10 mg/mL (1 %) injection 10 mL  10 mL SubCUTAneous RAD ONCE    lidocaine (PF) (XYLOCAINE) 10 mg/mL (1 %) injection 15 mL  15 mL SubCUTAneous RAD ONCE    lidocaine-EPINEPHrine (XYLOCAINE) 1 %-1:100,000 injection 150 mg  150 mg IntraDERMal ONCE    insulin glargine (LANTUS) injection 10 Units  10 Units SubCUTAneous 4 TIMES/WEEK (T,TH,SA,BRAVO)    insulin glargine (LANTUS) injection 8 Units  8 Units SubCUTAneous Q MON, WED & FRI    insulin lispro (HUMALOG) injection 1 Units  1 Units SubCUTAneous TIDAC    heparin (porcine) 1,000 unit/mL injection 1,800 Units  1,800 Units InterCATHeter DIALYSIS PRN    And    heparin (porcine) 1,000 unit/mL injection 1,800 Units  1,800 Units InterCATHeter DIALYSIS PRN    metoclopramide HCl (REGLAN) injection 5 mg  5 mg IntraVENous Q12H PRN    lidocaine 4 % patch 1 Patch  1 Patch TransDERmal Q24H    prochlorperazine (COMPAZINE) injection 10 mg  10 mg IntraVENous Q6H PRN    pantoprazole (PROTONIX) tablet 40 mg  40 mg Oral ACB    hydrALAZINE (APRESOLINE) tablet 50 mg  50 mg Oral TID    sodium chloride (NS) flush 10 mL  10 mL IntraVENous PRN    insulin lispro (HUMALOG) injection   SubCUTAneous AC&HS    glucose chewable tablet 16 g  4 Tablet Oral PRN    glucagon (GLUCAGEN) injection 1 mg  1 mg IntraMUSCular PRN    dextrose 10% infusion 0-250 mL  0-250 mL IntraVENous PRN    cloNIDine (CATAPRES) 0.1 mg/24 hr patch 1 Patch  1 Patch TransDERmal Q7D    carvediloL (COREG) tablet 25 mg  25 mg Oral BID WITH MEALS    aspirin chewable tablet 81 mg  81 mg Oral DAILY    lidocaine (XYLOCAINE) 2 % viscous solution 15 mL  15 mL Mouth/Throat PRN    benzocaine-menthoL (CEPACOL) lozenge 1 Lozenge  1 Lozenge Mucous Membrane PRN    morphine injection 2 mg  2 mg IntraVENous Q3H PRN    amLODIPine (NORVASC) tablet 10 mg  10 mg Oral DAILY    hydrALAZINE (APRESOLINE) 20 mg/mL injection 20 mg  20 mg IntraVENous Q6H PRN    DULoxetine (CYMBALTA) capsule 60 mg  60 mg Oral DAILY    finasteride (PROSCAR) tablet 5 mg  5 mg Oral DAILY    tamsulosin (FLOMAX) capsule 0.4 mg  0.4 mg Oral DAILY    sodium chloride (NS) flush 5-40 mL  5-40 mL IntraVENous Q8H    sodium chloride (NS) flush 5-40 mL  5-40 mL IntraVENous PRN    acetaminophen (TYLENOL) tablet 650 mg  650 mg Oral Q6H PRN    Or    acetaminophen (TYLENOL) suppository 650 mg  650 mg Rectal Q6H PRN    polyethylene glycol (MIRALAX) packet 17 g  17 g Oral DAILY PRN    heparin (porcine) injection 5,000 Units  5,000 Units SubCUTAneous Sunny Parker MD  10/25/2022

## 2022-10-25 NOTE — PROGRESS NOTES
Physical Therapy Note    Patient declines ambulating with PT in hallway. He is up adlib to the restroom. His mother is present and reports she feels confident with his mobility and D/C plan home with [place]. Patient is also in agreement with this plan. Will complete PT orders at this time.

## 2022-10-25 NOTE — PROGRESS NOTES
0820 - pt. Transferred back to his inpt. Room via stretcher accomp. By transport. 9722 - called Mark pt.'s nurse, to inform procedure was not completed as previously noted in [phone] note.

## 2022-10-25 NOTE — DISCHARGE INSTRUCTIONS
On Hemodialysis days (MWF)  Lantus = 8 units daily   Humalog/Novolog = 1 units before each meal and low dose sliding scale, 1 unit for every 50 above 150  -200 take 1 extra unit  -250 take 2 extra units  -300 take 3 extra units   -350 take 4 extra units   -400 take 5 extra units  -450 take 6 extra units      On Non-hemodialysis days (TTSS)  Lantus = 10 units daily  Humalog/Novolog = 1 units before each meal and low dose sliding scale, 1 unit for every 50 above 150  -200 take 1 extra unit  -250 take 2 extra units  -300 take 3 extra units   -350 take 4 extra units   -400 take 5 extra units  -450 take 6 extra units      Lantus/Novolog  (basal-bolus) regimen. His upcoming appt with Dr Yomaira Kat is on 10/28/2022 and I have updated her with plan that we will discontinue his insulin pump for now.

## 2022-10-25 NOTE — DISCHARGE SUMMARY
Hospitalist Discharge Summary     Patient ID:  Carla Lance  [id number]  46 y.o.  1980    PCP on record: James Robbins MD    Admit date: 10/16/2022  Discharge date and time: 10/25/2022      DISCHARGE DIAGNOSIS:    DKA      CONSULTATIONS:  IP CONSULT TO HOSPITALIST  IP CONSULT TO NEPHROLOGY  IP CONSULT TO ENDOCRINOLOGY  IP CONSULT TO PSYCHIATRY  IP CONSULT TO PSYCHIATRY  IP CONSULT TO CARDIOLOGY  IP CONSULT TO PULMONOLOGY    Excerpted HPI from H&P of Stacy oTrres MD:    Lino Soto is a 46 y.o. male with a history of type 1 diabetes mellitus and end-stage renal disease who presents with persistent nausea and vomiting. Patient was just recently in the hospital, 3 weeks ago for DKA and similarly presented with intractable nausea and vomiting. That episode was blamed on a malfunctioning insulin pump but the patient reports that his pump has been functioning well this past few days. 2 days ago he completed his Friday dialysis session without any issues. Yesterday morning he started to feel sick and started vomiting. He could not check his blood sugar because there was something wrong with his glucose monitor. He continued to experience nausea and vomiting all day yesterday and last night. With no relief, this morning he called for EMS. In the ER, blood sugar 924, sodium 130, potassium 5.5.,  Patient was started on an insulin drip and we were asked to admit for work up and evaluation of the above problems. ______________________________________________________________________  DISCHARGE SUMMARY/HOSPITAL COURSE:  for full details see H&P, daily progress notes, labs, consult notes. DKA POA Admitted with , HCO3 12 with AG 30  Insulin dependent DM type 1 POA on home insulin pump  Recent DKA from pump malfunction 9/2022  Admit started on insulin drip per Glucostabilizer protocol              Glycemic control improved and AG normalized  Insulin gtt DC'd  ?  Trigger sepsis  Diabetic teaching  Last A1c= 7.2  Po diet as tolerated - diabetic restrictions  Endocrine consulted, plan to transitioned to subQ insulin rather than pump, recs appreciated and ordered. Insulin adjusted as per Endocrine. Glucose has been better controlled  ADA diet  Will need to follow up as outpatient to determine if going back on pump is best for patient  Insulin regimen as per psych. Needed adjustment today due to hypoglycemia     Elevated troponin 17 --> 137 --> 133  Setting of DKA and recurrent N/V  At risk for underlying CAD  ASA  Tele, serial labs  Last echo 8/13/2022 with LVEF 55-60%, normal wall motion              Normal RV function  Similar bump with DKA admit 8/2022, saw [facility]  Underwent stress test on 10/20 which showed No ischemia or infarct demonstrated. LVEF 51%  Nuclear stress test is negative for ischemia preserved ejection fraction. Sepsis POA WBC 14.8, 106, lactate 1.6, procalcitonin 12.51  Possible UTI POA  CXR with no ASD or edema  CT abdomen/pelvis IMPRESSION  1. Questionable mild diffuse colitis. 2. Moderate distal esophagitis. 3. Trace pelvic free fluid. 4. Moderate diffuse bladder wall thickening. NS unable to get UA till today, was ordered since admit  UA 80 mg% ketones, 5 to 10 WBC, 5 to 10 RBCs, negative bacteria  blood and urine cultures NGTD  DC empiric ceftriaxone and vancomycin      Recurrent N/V POA  Generalized abdominal pain POA  ? Related to DKA, DKA resolved, but persistent              ? Lagging improvement in the DKA  CT abdomen/pelvis IMPRESSION  1. Questionable mild diffuse colitis. 2. Moderate distal esophagitis. 3. Trace pelvic free fluid. 4. Moderate diffuse bladder wall thickening.    Normal lipase  Suspect related to DKA     ESRD POA  Recently started on HD, MWF on 9/7/2022  Nephrology consulted, c/w scheduled HD     Severe esophageal/gastric inflammation POA  Seen on previous EGD  IV PPI     Essential HTN POA  C/w Amlodipine, c/w clonidine patch  Cont coreg  Hydralazine added     Recent urinary retention from prior admit POA hansen removed  Hansen in placed 1 month in august/September 2022  Urology concerned for ?? BPH and atonic bladder per consult  Removed last admit several weeks ago, follows with [facility]        Depression  Patient admits to being depressed  Will consult psychiatry, awaiting recs  Has been seen by behavioral health     Pleurisy  CXR WNL  Better with lidocaine patch  Seen by pulm  CTA showed pleural effusion. Thoracentesis has been ordered, but not enough fluid. Will get ultrafiltration at HD tomorrow. Patient improved and was stable for DC home.        _______________________________________________________________________  Patient seen and examined by me on discharge day. Pertinent Findings:  Gen:    Not in distress  Chest: Clear lungs  CVS:   Regular rhythm. No edema  Abd:  Soft, not distended, not tender  Neuro:  Alert, Oriented x 4, grossly non focal exam  _______________________________________________________________________  DISCHARGE MEDICATIONS:   Current Discharge Medication List        START taking these medications    Details   hydrALAZINE (APRESOLINE) 50 mg tablet Take 1 Tablet by mouth three (3) times daily. Qty: 90 Tablet, Refills: 0  Start date: 10/25/2022      !! insulin glargine (LANTUS) 100 unit/mL injection 10 Units by SubCUTAneous route every Tuesday, Thursday, Saturday & Sunday. Qty: 1 mL, Refills: 0  Start date: 10/25/2022      !! insulin glargine (LANTUS) 100 unit/mL injection 8 Units by SubCUTAneous route every Monday, Wednesday, Friday. Qty: 1 mL, Refills: 0  Start date: 10/26/2022      insulin lispro (HUMALOG) 100 unit/mL injection 1 Units by SubCUTAneous route Before breakfast, lunch, and dinner. Qty: 1 Each, Refills: 0  Start date: 10/25/2022       !! - Potential duplicate medications found. Please discuss with provider.         CONTINUE these medications which have NOT CHANGED Details   DULoxetine (CYMBALTA) 60 mg capsule Take 1 capsule by mouth once daily  Qty: 30 Capsule, Refills: 0    Associated Diagnoses: Other diabetic neurological complication associated with type 1 diabetes mellitus (HCC)      naloxone (Narcan) 4 mg/actuation nasal spray Use 1 spray intranasally, then discard. Repeat with new spray every 2 min as needed for opioid overdose symptoms, alternating nostrils. Qty: 1 Each, Refills: 0    Associated Diagnoses: Chronic midline low back pain without sciatica      rosuvastatin (CRESTOR) 10 mg tablet Take 1 Tablet by mouth nightly for 60 days. Qty: 30 Tablet, Refills: 1      bacitracin zinc (BACITRACIN) ointment Apply  to affected area two (2) times a day. Qty: 28 g, Refills: 4    Associated Diagnoses: Abrasion of scalp, initial encounter      Ketone Blood Test strp 50 Each by Does Not Apply route as needed for PRN Reason (Other) (as needed for suspected dka). Qty: 50 Strip, Refills: 3      pantoprazole (PROTONIX) 40 mg tablet Take 1 Tablet by mouth Daily (before breakfast). Qty: 30 Tablet, Refills: 0      finasteride (PROSCAR) 5 mg tablet Take 1 Tablet by mouth in the morning. Qty: 30 Tablet, Refills: 2      pregabalin (Lyrica) 75 mg capsule Take 1 Capsule by mouth two (2) times a day. Max Daily Amount: 150 mg.  Qty: 60 Capsule, Refills: 2    Associated Diagnoses: Other diabetic neurological complication associated with type 2 diabetes mellitus ([address] Recurrent falls      Walker (Ultra-Light Rollator) misc Use while ambulating  Qty: 1 Each, Refills: 0    Associated Diagnoses: DM type 2, goal HbA1c < 7% ([facility] Recurrent falls      amLODIPine (NORVASC) 10 mg tablet Take 1 Tablet by mouth daily. Qty: 90 Tablet, Refills: 1    Associated Diagnoses: Primary hypertension      cloNIDine (CATAPRES) 0.1 mg/24 hr ptwk 1 Patch by TransDERmal route every seven (7) days.   Qty: 12 Patch, Refills: 1    Associated Diagnoses: Primary hypertension      Dexcom G6  misc Use with the dexcom device to check blood sugars 4 times a day  Qty: 1 Each, Refills: 0    Comments: [phone] dx code E10.65      Dexcom G6 Sensor brandi Use as directed every 10 days  Qty: 10 Each, Refills: 11    Comments: [phone] dx code E10.65      Dexcom G6 Transmitter brandi Use as directed  Qty: 10 Each, Refills: 3    Comments: [phone] dx code E10.65      Insulin Needles, Disposable, 31 gauge x 5/16\" ndle Dx code E11.65, use 6x daily  Qty: 200 Each, Refills: 11      carvediloL (COREG) 12.5 mg tablet Take 1 Tablet by mouth two (2) times daily (with meals). Qty: 60 Tablet, Refills: 1      tamsulosin (FLOMAX) 0.4 mg capsule Take 0.4 mg by mouth daily. STOP taking these medications       insulin aspart U-100 (NovoLOG U-100 Insulin aspart) 100 unit/mL injection Comments:   Reason for Stopping:               My Recommended Diet, Activity, Wound Care, and follow-up labs are listed in the patient's Discharge Insturctions which I have personally completed and reviewed.   Risk of deterioration: High    Condition at Discharge:  Stable  _____________________________________________________________________    Disposition  Home with family and home health services  ____________________________________________________________________    Care Plan discussed with:   Patient, Family, RN, Care Manager, Consultant    ____________________________________________________________________    Code Status: Full Code  ____________________________________________________________________      Condition at Discharge:  Stable  _____________________________________________________________________  Follow up with:   PCP : Tae Aguirre MD  Follow-up Information       Follow up With Specialties Details Why Contact Napoloen Saleem MD Internal Medicine Physician   [address] [place] [address]      Keshia Ham MD Internal Medicine Physician, Endocrinology Physician Follow up [address]  [phone]                  Total time in minutes spent coordinating this discharge (includes going over instructions, follow-up, prescriptions, and preparing report for sign off to her PCP) :  35 minutes    Signed:  Jesse Reeves MD

## 2022-10-25 NOTE — PROGRESS NOTES
Attempted to schedule hospital follow up PCP appointment. Sent a message to PCP office to find patient an appointment. Awaiting callback from PCP office.  Lona Nelson

## 2022-10-25 NOTE — PROGRESS NOTES
IMPRESSION:   Left sided CP  Small to moderate left sided pleural effusion  ESRD- HD M/W/F  Prior Left pleural effusion- s/p thoracentesis 9/2022 with improvement of symptoms  Can not exclude small peripheral cavitary lesion on CT  H/O recreational drug use (last IV in 2016)  DKA- resolved  Prior tobacco abuse  H/o HTN      RECOMMENDATIONS/PLAN:   On room air  Fluid appears related to volume overload and will probably be best dealt with by UF. Unclear if this is the cause of his chest pain   Check echo to evaluate for vegetation  Empiric antibiotics   Pain control  HD per renal          10-25-22: not enough fluid for drainage by ultrasound this morning. He reports his chest pain is largely unchanged. 10-24-22: no events. Still with mild to moderate intermittent pleuritic left sided chest pain    Subjective/Initial History:   I have reviewed the flowsheet and previous days notes. Seen earlier today on rounds. I was asked by Geovanny Wilkerson MD to see Jemma Wray a 46 y.o.  male  in consultation for a chief complaint of CP.     47 yo M with a history ESRD (HD M/W/F), DM, L effusion (s/p thoracentesis 9/2022), prior tobacco abuse presented initially with hyperglycemia found to be in dka. He was traeaed with insulin gtt and his dka resolved. Pulm was consulted today due to sudden onset left posterior back pain. He underwent L thoracentesis in September and was experiencing a similar discomfort then. CXR 10/22/22 reveals no obvious effusion or any other acute airspace disease. He quit smoking years ago after 15 pack year history but denies any known lung diseases. The patient is unable to give any meaningful history or review of systems due to patient factors.  Excerpts from admission notes as follows:     \"\"    PMH:  has a past medical history of Bipolar 1 disorder, depressed ([address] Bipolar disorder ([address] Chronic kidney disease, stage 3a ([address] Depression, Diabetes ([address] DKA, type 1 ([address] (1/27/2013), DKA, type 1 ([address] H/O noncompliance with medical treatment, presenting hazards to health, MRSA (methicillin resistant staph aureus) culture positive, MRSA (methicillin resistant Staphylococcus aureus), Noncompliance with medication regimen, Second hand smoke exposure, Seizure ([address] and Seizures ([address] (2006 or 2007). PSH:   has a past surgical history that includes hx orthopaedic (Left); hx heent; upper gi endoscopy,biopsy (11/20/2018); ir insert non tunl cvc over 5 yrs (9/7/2022); and ir insert tunl cvc w/o port over 5 yr (9/9/2022). FHX: family history includes Diabetes in his mother and another family member. SHX:  reports that he quit smoking about 2 years ago. His smoking use included cigarettes. He started smoking about 23 years ago. He has a 1.60 pack-year smoking history. He has never used smokeless tobacco. He reports that he does not drink alcohol and does not use drugs. ROS:A comprehensive review of systems was negative except for that written in the HPI.     No Known Allergies     MAR reviewed and pertinent medications noted or modified as needed  MEDS:   Current Facility-Administered Medications   Medication    lidocaine (PF) (XYLOCAINE) 10 mg/mL (1 %) injection 10 mL    lidocaine (PF) (XYLOCAINE) 10 mg/mL (1 %) injection 15 mL    lidocaine-EPINEPHrine (XYLOCAINE) 1 %-1:100,000 injection 150 mg    insulin glargine (LANTUS) injection 10 Units    insulin glargine (LANTUS) injection 8 Units    insulin lispro (HUMALOG) injection 1 Units    heparin (porcine) 1,000 unit/mL injection 1,800 Units    And    heparin (porcine) 1,000 unit/mL injection 1,800 Units    metoclopramide HCl (REGLAN) injection 5 mg    lidocaine 4 % patch 1 Patch    prochlorperazine (COMPAZINE) injection 10 mg    pantoprazole (PROTONIX) tablet 40 mg    hydrALAZINE (APRESOLINE) tablet 50 mg    sodium chloride (NS) flush 10 mL    insulin lispro (HUMALOG) injection    glucose chewable tablet 16 g    glucagon (GLUCAGEN) injection 1 mg    dextrose 10% infusion 0-250 mL    cloNIDine (CATAPRES) 0.1 mg/24 hr patch 1 Patch    carvediloL (COREG) tablet 25 mg    aspirin chewable tablet 81 mg    lidocaine (XYLOCAINE) 2 % viscous solution 15 mL    benzocaine-menthoL (CEPACOL) lozenge 1 Lozenge    morphine injection 2 mg    amLODIPine (NORVASC) tablet 10 mg    hydrALAZINE (APRESOLINE) 20 mg/mL injection 20 mg    DULoxetine (CYMBALTA) capsule 60 mg    finasteride (PROSCAR) tablet 5 mg    tamsulosin (FLOMAX) capsule 0.4 mg    sodium chloride (NS) flush 5-40 mL    sodium chloride (NS) flush 5-40 mL    acetaminophen (TYLENOL) tablet 650 mg    Or    acetaminophen (TYLENOL) suppository 650 mg    polyethylene glycol (MIRALAX) packet 17 g    heparin (porcine) injection 5,000 Units        Objective:     Vital Signs: Telemetry:    normal sinus rhythm Intake/Output:   Visit Vitals  BP (!) 168/77 (BP 1 Location: Left lower arm, BP Patient Position: At rest)   Pulse 83   Temp 98.6 °F (37 °C)   Resp 20   Ht 5' 8\" (1.727 m)   Wt 69.2 kg (152 lb 8.9 oz)   SpO2 92%   BMI 23.20 kg/m²       Temp (24hrs), Av °F (36.7 °C), Min:97.7 °F (36.5 °C), Max:98.6 °F (37 °C)        O2 Device: None (Room air)         Wt Readings from Last 4 Encounters:   10/25/22 69.2 kg (152 lb 8.9 oz)   22 75.8 kg (167 lb 3.2 oz)   22 72.1 kg (159 lb)   22 81.6 kg (180 lb)          Intake/Output Summary (Last 24 hours) at 10/25/2022 0839  Last data filed at 10/24/2022 1057  Gross per 24 hour   Intake --   Output 0 ml   Net 0 ml         Last shift:      No intake/output data recorded. Last 3 shifts: No intake/output data recorded.      Physical Exam:    Gen: no distress  HEENT: poor dentition  Cardiac: no murmurs  Lungs: clear b/l , Point tenderness along left posterior chest wall  Abd: non tender  Neuro: aoo3    Data:   Labs:  Recent Labs     10/24/22  0736   WBC 5.5   HGB 12.5   HCT 40.2          Recent Labs     10/24/22  0736   *   K 4.1    CO2 28   *   BUN 25*   CREA 4.70*   CA 8.6   PHOS 4.1   ALB 2.1*       No results for input(s): PHI, PCO2I, PO2I, HCO3I, FIO2I in the last 72 hours. Imaging:  I have personally reviewed the patients radiographs:  Small effusion.   Can not exclude small peripheral left sided cavitary lesion        Perlita Lloyd MD

## 2022-10-25 NOTE — PROGRESS NOTES
Transition of Care Plan: Patient is being discharged today and his mother is here and will be transporting him home. RUR:34%    Disposition:Home with family    Follow up appointments:Office will call him to set up his appointment. DME needed:No    Transportation at Discharge: Mother    [address] or means to access home:    Mother has keys      IM Medicare Letter:N/A--Patient has [employer] Medicaid    Is patient a [place] and connected with the [address]? No                If yes, was [place] transfer form completed and [facility] notified? N/A    Caregiver Contact: Mother    Discharge Caregiver contacted prior to discharge? Caregiver contacted. Care Conference needed?: No        Patient is being discharged home today with his mother. He politely declined home health services. Called [facility] ([phone]) and spoke with [place] and informed her of patient's discharge for today. Run sheets faxed to them at [phone] with confirmation along with discharge summary. Patient aware to attend his dialysis on his scheduled days. Lashonda Rivero RN BSN CRM        [phone]

## 2022-10-25 NOTE — PROGRESS NOTES
Encounter opened in error - pt admitted to the hospital.    [facility] [address] [facility]/[phone]

## 2022-10-25 NOTE — PROGRESS NOTES
Reviewed discharge instructions and prescriptions with patient. Patient verbalizes understanding. PIV removed. Patient taken to front lobby via wheelchair.

## 2022-10-27 ENCOUNTER — HOSPITAL ENCOUNTER (OUTPATIENT)
Age: 42
Setting detail: OBSERVATION
Discharge: HOME | End: 2022-10-28
Attending: EMERGENCY MEDICINE | Admitting: INTERNAL MEDICINE
Payer: MEDICAID

## 2022-10-27 ENCOUNTER — APPOINTMENT (OUTPATIENT)
Dept: GENERAL RADIOLOGY | Age: 42
End: 2022-10-27
Attending: EMERGENCY MEDICINE
Payer: MEDICAID

## 2022-10-27 DIAGNOSIS — R07.89 ATYPICAL CHEST PAIN: ICD-10-CM

## 2022-10-27 DIAGNOSIS — J90 PLEURISY WITH EFFUSION: ICD-10-CM

## 2022-10-27 DIAGNOSIS — E16.2 HYPOGLYCEMIA: Primary | ICD-10-CM

## 2022-10-27 LAB
ALBUMIN SERPL-MCNC: 2.6 G/DL (ref 3.5–5)
ALBUMIN/GLOB SERPL: 0.7 {RATIO} (ref 1.1–2.2)
ALP SERPL-CCNC: 150 U/L (ref 45–117)
ALT SERPL-CCNC: 28 U/L (ref 12–78)
AMPHET UR QL SCN: NEGATIVE
ANION GAP BLD CALC-SCNC: 11 MMOL/L (ref 10–20)
ANION GAP SERPL CALC-SCNC: 10 MMOL/L (ref 5–15)
APPEARANCE UR: CLEAR
AST SERPL-CCNC: 20 U/L (ref 15–37)
ATRIAL RATE: 51 BPM
BACTERIA URNS QL MICRO: ABNORMAL /HPF
BARBITURATES UR QL SCN: NEGATIVE
BASOPHILS # BLD: 0 K/UL (ref 0–0.1)
BASOPHILS NFR BLD: 1 % (ref 0–1)
BENZODIAZ UR QL: NEGATIVE
BILIRUB SERPL-MCNC: 0.2 MG/DL (ref 0.2–1)
BILIRUB UR QL: NEGATIVE
BUN SERPL-MCNC: 35 MG/DL (ref 6–20)
BUN/CREAT SERPL: 7 (ref 12–20)
CA-I BLD-MCNC: 1.31 MMOL/L (ref 1.12–1.32)
CALCIUM SERPL-MCNC: 9.5 MG/DL (ref 8.5–10.1)
CALCULATED P AXIS, ECG09: 54 DEGREES
CALCULATED R AXIS, ECG10: -54 DEGREES
CALCULATED T AXIS, ECG11: 71 DEGREES
CANNABINOIDS UR QL SCN: NEGATIVE
CHLORIDE BLD-SCNC: 99 MMOL/L (ref 100–108)
CHLORIDE SERPL-SCNC: 100 MMOL/L (ref 97–108)
CO2 BLD-SCNC: 24 MMOL/L (ref 19–24)
CO2 SERPL-SCNC: 24 MMOL/L (ref 21–32)
COCAINE UR QL SCN: NEGATIVE
COLOR UR: ABNORMAL
COMMENT, HOLDF: NORMAL
CREAT SERPL-MCNC: 4.85 MG/DL (ref 0.7–1.3)
CREAT UR-MCNC: 4.9 MG/DL (ref 0.6–1.3)
DIAGNOSIS, 93000: NORMAL
DIFFERENTIAL METHOD BLD: ABNORMAL
DRUG SCRN COMMENT,DRGCM: ABNORMAL
ECHO AV AREA PEAK VELOCITY: 2.8 CM2
ECHO AV AREA VTI: 3.5 CM2
ECHO AV AREA/BSA PEAK VELOCITY: 1.5 CM2/M2
ECHO AV AREA/BSA VTI: 1.9 CM2/M2
ECHO AV MEAN GRADIENT: 2 MMHG
ECHO AV MEAN VELOCITY: 0.7 M/S
ECHO AV PEAK GRADIENT: 4 MMHG
ECHO AV PEAK VELOCITY: 1 M/S
ECHO AV VELOCITY RATIO: 0.9
ECHO AV VTI: 17.4 CM
ECHO LA DIAMETER INDEX: 1.65 CM/M2
ECHO LA DIAMETER: 3 CM
ECHO LA VOL 4C: 29 ML (ref 18–58)
ECHO LA VOLUME INDEX A4C: 16 ML/M2 (ref 16–34)
ECHO LV E' SEPTAL VELOCITY: 5 CM/S
ECHO LV EDV A4C: 99 ML
ECHO LV EDV INDEX A4C: 54 ML/M2
ECHO LV EJECTION FRACTION A4C: 49 %
ECHO LV ESV A4C: 51 ML
ECHO LV ESV INDEX A4C: 28 ML/M2
ECHO LV FRACTIONAL SHORTENING: 21 % (ref 28–44)
ECHO LV INTERNAL DIMENSION DIASTOLE INDEX: 2.58 CM/M2
ECHO LV INTERNAL DIMENSION DIASTOLIC: 4.7 CM (ref 4.2–5.9)
ECHO LV INTERNAL DIMENSION SYSTOLIC INDEX: 2.03 CM/M2
ECHO LV INTERNAL DIMENSION SYSTOLIC: 3.7 CM
ECHO LV IVSD: 1.1 CM (ref 0.6–1)
ECHO LV MASS 2D: 175.8 G (ref 88–224)
ECHO LV MASS INDEX 2D: 96.6 G/M2 (ref 49–115)
ECHO LV POSTERIOR WALL DIASTOLIC: 1 CM (ref 0.6–1)
ECHO LV RELATIVE WALL THICKNESS RATIO: 0.43
ECHO LVOT AREA: 3.1 CM2
ECHO LVOT AV VTI INDEX: 1.1
ECHO LVOT DIAM: 2 CM
ECHO LVOT MEAN GRADIENT: 2 MMHG
ECHO LVOT PEAK GRADIENT: 3 MMHG
ECHO LVOT PEAK VELOCITY: 0.9 M/S
ECHO LVOT STROKE VOLUME INDEX: 33 ML/M2
ECHO LVOT SV: 60 ML
ECHO LVOT VTI: 19.1 CM
ECHO MV A VELOCITY: 0.78 M/S
ECHO MV AREA PHT: 4.8 CM2
ECHO MV AREA VTI: 2.2 CM2
ECHO MV E DECELERATION TIME (DT): 255.8 MS
ECHO MV E VELOCITY: 0.63 M/S
ECHO MV E/A RATIO: 0.81
ECHO MV E/E' SEPTAL: 12.6
ECHO MV LVOT VTI INDEX: 1.41
ECHO MV MAX VELOCITY: 0.7 M/S
ECHO MV MEAN GRADIENT: 1 MMHG
ECHO MV MEAN VELOCITY: 0.5 M/S
ECHO MV PEAK GRADIENT: 2 MMHG
ECHO MV PRESSURE HALF TIME (PHT): 45.6 MS
ECHO MV REGURGITANT PEAK GRADIENT: 130 MMHG
ECHO MV REGURGITANT PEAK VELOCITY: 5.7 M/S
ECHO MV VTI: 26.9 CM
ECHO PV MAX VELOCITY: 1 M/S
ECHO PV PEAK GRADIENT: 4 MMHG
ECHO RV FREE WALL PEAK S': 10 CM/S
ECHO RV TAPSE: 1.5 CM (ref 1.7–?)
EOSINOPHIL # BLD: 0.3 K/UL (ref 0–0.4)
EOSINOPHIL NFR BLD: 5 % (ref 0–7)
EPITH CASTS URNS QL MICRO: ABNORMAL /LPF
ERYTHROCYTE [DISTWIDTH] IN BLOOD BY AUTOMATED COUNT: 18.5 % (ref 11.5–14.5)
GLOBULIN SER CALC-MCNC: 3.9 G/DL (ref 2–4)
GLUCOSE BLD STRIP.AUTO-MCNC: 105 MG/DL (ref 65–117)
GLUCOSE BLD STRIP.AUTO-MCNC: 149 MG/DL (ref 65–117)
GLUCOSE BLD STRIP.AUTO-MCNC: 50 MG/DL (ref 65–117)
GLUCOSE BLD STRIP.AUTO-MCNC: 504 MG/DL (ref 65–117)
GLUCOSE BLD STRIP.AUTO-MCNC: 531 MG/DL (ref 65–117)
GLUCOSE BLD STRIP.AUTO-MCNC: 58 MG/DL (ref 65–117)
GLUCOSE BLD STRIP.AUTO-MCNC: 61 MG/DL (ref 74–106)
GLUCOSE BLD STRIP.AUTO-MCNC: 65 MG/DL (ref 65–117)
GLUCOSE BLD STRIP.AUTO-MCNC: 73 MG/DL (ref 65–117)
GLUCOSE BLD STRIP.AUTO-MCNC: 79 MG/DL (ref 65–117)
GLUCOSE BLD STRIP.AUTO-MCNC: 86 MG/DL (ref 65–117)
GLUCOSE BLD STRIP.AUTO-MCNC: 87 MG/DL (ref 65–117)
GLUCOSE BLD STRIP.AUTO-MCNC: 93 MG/DL (ref 65–117)
GLUCOSE BLD STRIP.AUTO-MCNC: 95 MG/DL (ref 65–117)
GLUCOSE SERPL-MCNC: 63 MG/DL (ref 65–100)
GLUCOSE UR STRIP.AUTO-MCNC: 250 MG/DL
HCT VFR BLD AUTO: 45.1 % (ref 36.6–50.3)
HGB BLD-MCNC: 14.2 G/DL (ref 12.1–17)
HGB UR QL STRIP: ABNORMAL
HYALINE CASTS URNS QL MICRO: ABNORMAL /LPF (ref 0–5)
IMM GRANULOCYTES # BLD AUTO: 0 K/UL (ref 0–0.04)
IMM GRANULOCYTES NFR BLD AUTO: 0 % (ref 0–0.5)
KETONES UR QL STRIP.AUTO: NEGATIVE MG/DL
LACTATE BLD-SCNC: 0.61 MMOL/L (ref 0.4–2)
LACTATE BLD-SCNC: 0.79 MMOL/L (ref 0.4–2)
LEUKOCYTE ESTERASE UR QL STRIP.AUTO: ABNORMAL
LYMPHOCYTES # BLD: 1.1 K/UL (ref 0.8–3.5)
LYMPHOCYTES NFR BLD: 21 % (ref 12–49)
MCH RBC QN AUTO: 26.1 PG (ref 26–34)
MCHC RBC AUTO-ENTMCNC: 31.5 G/DL (ref 30–36.5)
MCV RBC AUTO: 82.8 FL (ref 80–99)
METHADONE UR QL: NEGATIVE
MONOCYTES # BLD: 0.5 K/UL (ref 0–1)
MONOCYTES NFR BLD: 10 % (ref 5–13)
NEUTS SEG # BLD: 3.2 K/UL (ref 1.8–8)
NEUTS SEG NFR BLD: 63 % (ref 32–75)
NITRITE UR QL STRIP.AUTO: NEGATIVE
NRBC # BLD: 0 K/UL (ref 0–0.01)
NRBC BLD-RTO: 0 PER 100 WBC
OPIATES UR QL: POSITIVE
P-R INTERVAL, ECG05: 174 MS
PCP UR QL: NEGATIVE
PH UR STRIP: 7.5 [PH] (ref 5–8)
PLATELET # BLD AUTO: 346 K/UL (ref 150–400)
PMV BLD AUTO: 9.7 FL (ref 8.9–12.9)
POTASSIUM BLD-SCNC: 4 MMOL/L (ref 3.5–5.5)
POTASSIUM SERPL-SCNC: 3.9 MMOL/L (ref 3.5–5.1)
PROT SERPL-MCNC: 6.5 G/DL (ref 6.4–8.2)
PROT UR STRIP-MCNC: >300 MG/DL
Q-T INTERVAL, ECG07: 520 MS
QRS DURATION, ECG06: 106 MS
QTC CALCULATION (BEZET), ECG08: 479 MS
RBC # BLD AUTO: 5.45 M/UL (ref 4.1–5.7)
RBC #/AREA URNS HPF: ABNORMAL /HPF (ref 0–5)
SAMPLES BEING HELD,HOLD: NORMAL
SERVICE CMNT-IMP: ABNORMAL
SERVICE CMNT-IMP: NORMAL
SODIUM BLD-SCNC: 133 MMOL/L (ref 136–145)
SODIUM SERPL-SCNC: 134 MMOL/L (ref 136–145)
SP GR UR REFRACTOMETRY: 1.02
TROPONIN-HIGH SENSITIVITY: 27 NG/L (ref 0–76)
TROPONIN-HIGH SENSITIVITY: 31 NG/L (ref 0–76)
UA: UC IF INDICATED,UAUC: ABNORMAL
UROBILINOGEN UR QL STRIP.AUTO: 0.2 EU/DL (ref 0.2–1)
VENTRICULAR RATE, ECG03: 51 BPM
WBC # BLD AUTO: 5.1 K/UL (ref 4.1–11.1)
WBC URNS QL MICRO: ABNORMAL /HPF (ref 0–4)

## 2022-10-27 PROCEDURE — 94762 N-INVAS EAR/PLS OXIMTRY CONT: CPT

## 2022-10-27 PROCEDURE — 71045 X-RAY EXAM CHEST 1 VIEW: CPT

## 2022-10-27 PROCEDURE — 74011250637 HC RX REV CODE- 250/637: Performed by: INTERNAL MEDICINE

## 2022-10-27 PROCEDURE — G0378 HOSPITAL OBSERVATION PER HR: HCPCS

## 2022-10-27 PROCEDURE — 90935 HEMODIALYSIS ONE EVALUATION: CPT

## 2022-10-27 PROCEDURE — 74011250637 HC RX REV CODE- 250/637: Performed by: EMERGENCY MEDICINE

## 2022-10-27 PROCEDURE — 74011250636 HC RX REV CODE- 250/636: Performed by: INTERNAL MEDICINE

## 2022-10-27 PROCEDURE — 96375 TX/PRO/DX INJ NEW DRUG ADDON: CPT

## 2022-10-27 PROCEDURE — 74011636637 HC RX REV CODE- 636/637: Performed by: INTERNAL MEDICINE

## 2022-10-27 PROCEDURE — G0257 UNSCHED DIALYSIS ESRD PT HOS: HCPCS

## 2022-10-27 PROCEDURE — 80307 DRUG TEST PRSMV CHEM ANLYZR: CPT

## 2022-10-27 PROCEDURE — 81001 URINALYSIS AUTO W/SCOPE: CPT

## 2022-10-27 PROCEDURE — 96374 THER/PROPH/DIAG INJ IV PUSH: CPT

## 2022-10-27 PROCEDURE — 96365 THER/PROPH/DIAG IV INF INIT: CPT

## 2022-10-27 PROCEDURE — 93005 ELECTROCARDIOGRAM TRACING: CPT

## 2022-10-27 PROCEDURE — 96372 THER/PROPH/DIAG INJ SC/IM: CPT

## 2022-10-27 PROCEDURE — 85025 COMPLETE CBC W/AUTO DIFF WBC: CPT

## 2022-10-27 PROCEDURE — 87086 URINE CULTURE/COLONY COUNT: CPT

## 2022-10-27 PROCEDURE — 96376 TX/PRO/DX INJ SAME DRUG ADON: CPT

## 2022-10-27 PROCEDURE — 74011000250 HC RX REV CODE- 250: Performed by: INTERNAL MEDICINE

## 2022-10-27 PROCEDURE — 99285 EMERGENCY DEPT VISIT HI MDM: CPT

## 2022-10-27 PROCEDURE — 84484 ASSAY OF TROPONIN QUANT: CPT

## 2022-10-27 PROCEDURE — 83605 ASSAY OF LACTIC ACID: CPT

## 2022-10-27 PROCEDURE — 80047 BASIC METABLC PNL IONIZED CA: CPT

## 2022-10-27 PROCEDURE — 82962 GLUCOSE BLOOD TEST: CPT

## 2022-10-27 PROCEDURE — 36415 COLL VENOUS BLD VENIPUNCTURE: CPT

## 2022-10-27 PROCEDURE — 96366 THER/PROPH/DIAG IV INF ADDON: CPT

## 2022-10-27 PROCEDURE — 80053 COMPREHEN METABOLIC PANEL: CPT

## 2022-10-27 RX ORDER — PROMETHAZINE HYDROCHLORIDE 25 MG/1
12.5 TABLET ORAL
Status: DISCONTINUED | OUTPATIENT
Start: 2022-10-27 | End: 2022-10-28 | Stop reason: HOSPADM

## 2022-10-27 RX ORDER — MAGNESIUM SULFATE 100 %
4 CRYSTALS MISCELLANEOUS AS NEEDED
Status: DISCONTINUED | OUTPATIENT
Start: 2022-10-27 | End: 2022-10-28 | Stop reason: HOSPADM

## 2022-10-27 RX ORDER — POLYETHYLENE GLYCOL 3350 17 G/17G
17 POWDER, FOR SOLUTION ORAL DAILY
Status: DISCONTINUED | OUTPATIENT
Start: 2022-10-28 | End: 2022-10-28 | Stop reason: HOSPADM

## 2022-10-27 RX ORDER — MAGNESIUM SULFATE 100 %
4 CRYSTALS MISCELLANEOUS AS NEEDED
Status: DISCONTINUED | OUTPATIENT
Start: 2022-10-27 | End: 2022-10-28 | Stop reason: SDUPTHER

## 2022-10-27 RX ORDER — CARVEDILOL 12.5 MG/1
12.5 TABLET ORAL 2 TIMES DAILY WITH MEALS
Status: DISCONTINUED | OUTPATIENT
Start: 2022-10-27 | End: 2022-10-28 | Stop reason: HOSPADM

## 2022-10-27 RX ORDER — ACETAMINOPHEN 650 MG/1
650 SUPPOSITORY RECTAL
Status: DISCONTINUED | OUTPATIENT
Start: 2022-10-27 | End: 2022-10-28 | Stop reason: HOSPADM

## 2022-10-27 RX ORDER — CLONIDINE 0.1 MG/24H
1 PATCH, EXTENDED RELEASE TRANSDERMAL
Status: DISCONTINUED | OUTPATIENT
Start: 2022-10-27 | End: 2022-10-28 | Stop reason: HOSPADM

## 2022-10-27 RX ORDER — ACETAMINOPHEN 500 MG
1000 TABLET ORAL ONCE
Status: COMPLETED | OUTPATIENT
Start: 2022-10-27 | End: 2022-10-27

## 2022-10-27 RX ORDER — MORPHINE SULFATE 2 MG/ML
2 INJECTION, SOLUTION INTRAMUSCULAR; INTRAVENOUS
Status: DISCONTINUED | OUTPATIENT
Start: 2022-10-27 | End: 2022-10-28 | Stop reason: HOSPADM

## 2022-10-27 RX ORDER — SODIUM CHLORIDE 0.9 % (FLUSH) 0.9 %
5-40 SYRINGE (ML) INJECTION AS NEEDED
Status: DISCONTINUED | OUTPATIENT
Start: 2022-10-27 | End: 2022-10-28 | Stop reason: HOSPADM

## 2022-10-27 RX ORDER — BISACODYL 5 MG
5 TABLET, DELAYED RELEASE (ENTERIC COATED) ORAL DAILY PRN
Status: DISCONTINUED | OUTPATIENT
Start: 2022-10-27 | End: 2022-10-28 | Stop reason: HOSPADM

## 2022-10-27 RX ORDER — FINASTERIDE 5 MG/1
5 TABLET, FILM COATED ORAL DAILY
Status: DISCONTINUED | OUTPATIENT
Start: 2022-10-28 | End: 2022-10-28 | Stop reason: HOSPADM

## 2022-10-27 RX ORDER — SODIUM CHLORIDE 0.9 % (FLUSH) 0.9 %
5-40 SYRINGE (ML) INJECTION EVERY 8 HOURS
Status: DISCONTINUED | OUTPATIENT
Start: 2022-10-27 | End: 2022-10-28 | Stop reason: HOSPADM

## 2022-10-27 RX ORDER — LIDOCAINE 4 G/100G
1 PATCH TOPICAL EVERY 24 HOURS
Status: DISCONTINUED | OUTPATIENT
Start: 2022-10-27 | End: 2022-10-28 | Stop reason: HOSPADM

## 2022-10-27 RX ORDER — PANTOPRAZOLE SODIUM 40 MG/1
40 TABLET, DELAYED RELEASE ORAL
Status: DISCONTINUED | OUTPATIENT
Start: 2022-10-28 | End: 2022-10-28 | Stop reason: HOSPADM

## 2022-10-27 RX ORDER — INSULIN LISPRO 100 [IU]/ML
INJECTION, SOLUTION INTRAVENOUS; SUBCUTANEOUS
Status: DISCONTINUED | OUTPATIENT
Start: 2022-10-28 | End: 2022-10-28 | Stop reason: HOSPADM

## 2022-10-27 RX ORDER — DULOXETIN HYDROCHLORIDE 30 MG/1
60 CAPSULE, DELAYED RELEASE ORAL DAILY
Status: DISCONTINUED | OUTPATIENT
Start: 2022-10-28 | End: 2022-10-28 | Stop reason: HOSPADM

## 2022-10-27 RX ORDER — ONDANSETRON 2 MG/ML
4 INJECTION INTRAMUSCULAR; INTRAVENOUS
Status: DISCONTINUED | OUTPATIENT
Start: 2022-10-27 | End: 2022-10-28 | Stop reason: HOSPADM

## 2022-10-27 RX ORDER — HYDRALAZINE HYDROCHLORIDE 20 MG/ML
20 INJECTION INTRAMUSCULAR; INTRAVENOUS
Status: DISCONTINUED | OUTPATIENT
Start: 2022-10-27 | End: 2022-10-28 | Stop reason: HOSPADM

## 2022-10-27 RX ORDER — ACETAMINOPHEN 325 MG/1
650 TABLET ORAL
Status: DISCONTINUED | OUTPATIENT
Start: 2022-10-27 | End: 2022-10-28 | Stop reason: HOSPADM

## 2022-10-27 RX ORDER — GUAIFENESIN 100 MG/5ML
81 LIQUID (ML) ORAL DAILY
Status: DISCONTINUED | OUTPATIENT
Start: 2022-10-28 | End: 2022-10-28 | Stop reason: HOSPADM

## 2022-10-27 RX ORDER — DEXTROSE MONOHYDRATE 100 MG/ML
25 INJECTION, SOLUTION INTRAVENOUS CONTINUOUS
Status: DISCONTINUED | OUTPATIENT
Start: 2022-10-27 | End: 2022-10-28 | Stop reason: HOSPADM

## 2022-10-27 RX ORDER — HEPARIN SODIUM 5000 [USP'U]/ML
5000 INJECTION, SOLUTION INTRAVENOUS; SUBCUTANEOUS EVERY 8 HOURS
Status: DISCONTINUED | OUTPATIENT
Start: 2022-10-27 | End: 2022-10-28 | Stop reason: HOSPADM

## 2022-10-27 RX ORDER — ROSUVASTATIN CALCIUM 10 MG/1
10 TABLET, COATED ORAL
Status: DISCONTINUED | OUTPATIENT
Start: 2022-10-27 | End: 2022-10-28 | Stop reason: HOSPADM

## 2022-10-27 RX ORDER — INSULIN LISPRO 100 [IU]/ML
4 INJECTION, SOLUTION INTRAVENOUS; SUBCUTANEOUS ONCE
Status: DISCONTINUED | OUTPATIENT
Start: 2022-10-27 | End: 2022-10-27 | Stop reason: SDUPTHER

## 2022-10-27 RX ORDER — DEXTROSE MONOHYDRATE 100 MG/ML
0-250 INJECTION, SOLUTION INTRAVENOUS AS NEEDED
Status: DISCONTINUED | OUTPATIENT
Start: 2022-10-27 | End: 2022-10-27 | Stop reason: SDUPTHER

## 2022-10-27 RX ORDER — INSULIN GLARGINE 100 [IU]/ML
10 INJECTION, SOLUTION SUBCUTANEOUS
Status: DISCONTINUED | OUTPATIENT
Start: 2022-10-27 | End: 2022-10-28 | Stop reason: HOSPADM

## 2022-10-27 RX ORDER — TAMSULOSIN HYDROCHLORIDE 0.4 MG/1
0.4 CAPSULE ORAL DAILY
Status: DISCONTINUED | OUTPATIENT
Start: 2022-10-28 | End: 2022-10-28 | Stop reason: HOSPADM

## 2022-10-27 RX ORDER — INSULIN LISPRO 100 [IU]/ML
6 INJECTION, SOLUTION INTRAVENOUS; SUBCUTANEOUS ONCE
Status: COMPLETED | OUTPATIENT
Start: 2022-10-27 | End: 2022-10-27

## 2022-10-27 RX ORDER — PREGABALIN 25 MG/1
75 CAPSULE ORAL 2 TIMES DAILY
Status: DISCONTINUED | OUTPATIENT
Start: 2022-10-27 | End: 2022-10-28 | Stop reason: HOSPADM

## 2022-10-27 RX ORDER — AMLODIPINE BESYLATE 5 MG/1
10 TABLET ORAL DAILY
Status: DISCONTINUED | OUTPATIENT
Start: 2022-10-28 | End: 2022-10-28 | Stop reason: HOSPADM

## 2022-10-27 RX ADMIN — ROSUVASTATIN CALCIUM 10 MG: 10 TABLET, COATED ORAL at 22:38

## 2022-10-27 RX ADMIN — HYDRALAZINE HYDROCHLORIDE 20 MG: 20 INJECTION INTRAMUSCULAR; INTRAVENOUS at 12:28

## 2022-10-27 RX ADMIN — INSULIN GLARGINE 10 UNITS: 100 INJECTION, SOLUTION SUBCUTANEOUS at 22:38

## 2022-10-27 RX ADMIN — MORPHINE SULFATE 2 MG: 2 INJECTION, SOLUTION INTRAMUSCULAR; INTRAVENOUS at 22:34

## 2022-10-27 RX ADMIN — HEPARIN SODIUM 5000 UNITS: 5000 INJECTION INTRAVENOUS; SUBCUTANEOUS at 17:23

## 2022-10-27 RX ADMIN — HEPARIN SODIUM 1800 UNITS: 1000 INJECTION, SOLUTION INTRAVENOUS; SUBCUTANEOUS at 17:08

## 2022-10-27 RX ADMIN — ACETAMINOPHEN 1000 MG: 500 TABLET ORAL at 03:34

## 2022-10-27 RX ADMIN — PREGABALIN 75 MG: 25 CAPSULE ORAL at 17:21

## 2022-10-27 RX ADMIN — HEPARIN SODIUM 5000 UNITS: 5000 INJECTION INTRAVENOUS; SUBCUTANEOUS at 22:39

## 2022-10-27 RX ADMIN — MORPHINE SULFATE 2 MG: 2 INJECTION, SOLUTION INTRAMUSCULAR; INTRAVENOUS at 12:28

## 2022-10-27 RX ADMIN — MORPHINE SULFATE 2 MG: 2 INJECTION, SOLUTION INTRAMUSCULAR; INTRAVENOUS at 17:23

## 2022-10-27 RX ADMIN — SODIUM CHLORIDE, PRESERVATIVE FREE 10 ML: 5 INJECTION INTRAVENOUS at 22:36

## 2022-10-27 RX ADMIN — MORPHINE SULFATE 2 MG: 2 INJECTION, SOLUTION INTRAMUSCULAR; INTRAVENOUS at 19:51

## 2022-10-27 RX ADMIN — Medication 6 UNITS: at 22:37

## 2022-10-27 RX ADMIN — ONDANSETRON 4 MG: 2 INJECTION INTRAMUSCULAR; INTRAVENOUS at 19:51

## 2022-10-27 RX ADMIN — DEXTROSE MONOHYDRATE 50 ML/HR: 100 INJECTION, SOLUTION INTRAVENOUS at 12:28

## 2022-10-27 RX ADMIN — CARVEDILOL 12.5 MG: 12.5 TABLET, FILM COATED ORAL at 17:21

## 2022-10-27 NOTE — PROGRESS NOTES
1800: TRANSFER - IN REPORT:    Verbal report received from RN(name) on Jaziel Roland  being received from ED(unit) for routine progression of care      Report consisted of patients Situation, Background, Assessment and   Recommendations(SBAR). Information from the following report(s) SBAR, Kardex, ED Summary, Intake/Output, MAR, and Recent Results was reviewed with the receiving nurse. Opportunity for questions and clarification was provided. Assessment completed upon patients arrival to unit and care assumed.

## 2022-10-27 NOTE — ED NOTES
TRANSITION OF CARE - SBAR OUT    Patient is being transferred to [address], Room# 2215. Report GIVEN TO Tameka Pool RN on [facility] for routine progression of care. Report is consisted of the following information SBAR, Kardex, ED Summary, and MAR. Patient transferred to receiving unit by: transport (RN or Tech Name)     Called outstanding consults: Yes . Collected routine labs: Yes . All current orders reviewed with accepting nurse: Yes    The following personal items will be sent with the patient during transfer to the floor: All valuables:                          CARDIAC MONITORING ORDERED: Yes .     The following CURRENT information were reported to the receiving RN:    CODE STATUS: Full Code    NIH SCORE:    THAIS SCREENING:      NEURO ASSESSMENT:        RESTRAINTS IN USE: No      IS DOCUMENTATION COMPLETE: Yes      Vital Signs  Level of Consciousness: Alert (0) (10/27/22 1210)  Temp: 98.3 °F (36.8 °C) (10/27/22 1705)  Temp Source: Oral (10/27/22 0700)  Pulse (Heart Rate): 90 (10/27/22 1705)  Heart Rate Source: Monitor (10/27/22 0700)  Cardiac Rhythm: Sinus Rhythm (10/27/22 0700)  Resp Rate: 18 (10/27/22 1705)  BP: (!) 158/82 (10/27/22 1705)  MAP (Monitor): 123 (10/27/22 1210)  MAP (Calculated): 107 (10/27/22 1705)  BP 1 Location: Right upper arm (10/27/22 0700)  BP 1 Method: Automatic (10/27/22 0700)  BP Patient Position: At rest, Semi fowlers (10/27/22 0700)  MEWS Score: 1 (10/27/22 1210)  Pain 1  Pain Scale 1: Numeric (0 - 10) (10/27/22 0524)  Pain Intensity 1: 8 (10/27/22 0524)  Patient Stated Pain Goal: 0 (10/27/22 0524)  Pain Location 1: Chest (10/27/22 0524)  Pain Orientation 1: Mid (10/27/22 0225)  Pain Description 1: Sho Ceja (10/27/22 0225)      OXYGEN: Oxygen Therapy  O2 Device: None (10/27/22 0700)    KINDER FALL ASSESSMENT:  No data recorded    WOUNDS: No  .    URINARY CATHETER: voiding    LINE ACCESS:   Peripheral IV 10/27/22 Left Forearm (Active)   Site Assessment Clean, dry, & intact 10/27/22 0236   Phlebitis Assessment 0 10/27/22 0236   Infiltration Assessment 0 10/27/22 0236   Dressing Type Transparent 10/27/22 0236   Hub Color/Line Status Capped;Flushed;Patent 10/27/22 0236   Action Taken Blood drawn 10/27/22 0236   Alcohol Cap Used No 10/27/22 0236        Opportunity for questions and clarification were provided.   Glory Gu RN

## 2022-10-27 NOTE — PROCEDURES
Hemodialysis / [phone]    Vitals Pre Post Assessment Pre Post   BP BP: (!) 155/85 (10/27/22 1334)   158/82 LOC A&Ox4, withdrawn, flat affect A&Ox4, withdrawn, flat affect   HR Pulse (Heart Rate): 81 (10/27/22 1334) 90 Lungs clear clear   Resp Resp Rate: 18 (10/27/22 1334) 18 Cardiac NSR NSR   Temp Temp: 98 °F (36.7 °C) (10/27/22 1334) 98.3 Skin intact intact   Weight  N/a N/a Edema Trace BLE Trace BLE   Tele status Bedside monitor Bedside monitor Pain 0 0     Orders   Duration: Start: 5708 End: 1705 Total: 3.5 hours   Dialyzer: Dialyzer/Set Up Inspection: Revaclear (10/27/22 1334)   K Bath: Dialysate K (mEq/L): 3 (10/27/22 1334)   Ca Bath: Dialysate CA (mEq/L): 2.5 (10/27/22 1334)   Na: Dialysate NA (mEq/L): 138 (10/27/22 1334)   Bicarb: Dialysate HCO3 (mEq/L): 35 (10/27/22 1334)   Target Fluid Removal: Goal/Amount of Fluid to Remove (mL): 2000 mL (10/27/22 1334)     Access   Type & Location: R PC   Comments: Pre- Assessment: Dressing changed and dated 10/27/22. No s/s of infection. Both lumens aspirate & flush well. Running well at . Post assessment: Dressing CDI. No changes.      Labs   HBsAg (Antigen) / date: Negative 10/17/22                                              HBsAb (Antibody) / date: Susceptible 10/17/22   Source: Epic   Obtained/Reviewed  Critical Results Called HGB   Date Value Ref Range Status   10/27/2022 14.2 12.1 - 17.0 g/dL Final     Potassium   Date Value Ref Range Status   10/27/2022 3.9 3.5 - 5.1 mmol/L Final     Calcium   Date Value Ref Range Status   10/27/2022 9.5 8.5 - 10.1 MG/DL Final     BUN   Date Value Ref Range Status   10/27/2022 35 (H) 6 - 20 MG/DL Final     Creatinine   Date Value Ref Range Status   10/27/2022 4.85 (H) 0.70 - 1.30 MG/DL Final        Meds Given   Name Dose Route   heparin 1800 units and 1800 units CVC dwell post dialysis               Adequacy / Fluid    Total Liters Process: 74.3L   Net Fluid Removed: 2000ml      Comments   Time Out Done:   (Time) 200   Admitting Diagnosis: Hypoglycemia,    Consent obtained/signed: Informed Consent Verified: Yes (chronic dialysis, no consent needed) (10/27/22 1334)   Machine / RO # Machine Number: [id number] (10/27/22 1334)   Primary Nurse Rpt Pre: King Egan RN   Primary Nurse Rpt Post: King Egan RN   Pt Education: Procedure   Care Plan: Ongoing   P[facility] outpatient [facility]: [facility]   Comments:   SBAR received from Primary RN. Arrived to pt's bedside. Pt A&Ox4. Chronic consent applies. Pt agreeable to treatment. 1334: Each catheter limb disinfected per p&p, caps removed, hubs disinfected per p&p. Each lumen aspirated for blood return and flushed with Normal Saline per policy. VSS. Dialysis Tx initiated. 1515: 300ml NS given to prevent clotting. **Patient tolerated treatment well without complaints or complications. All lines and connections remained intact and visible throughout entire treatment. **    1705: Tx ended. VSS. Each dialysis catheter limb disinfected per p&p, all possible blood returned per p&p, and each dialysis hub disinfected per p&p. Each lumen flushed, post dialysis catheter Heparin dwell instilled per order, and caps applied. Bed locked and in the lowest position, call bell and belongings in reach. SBAR given to Primary, RN. Patient is stable at time of their/ my departure. All Dialysis related medications have been reviewed.

## 2022-10-27 NOTE — ED TRIAGE NOTES
Pt was found unresponsive by family.   On ems arrival BS was 28,  D10 was given to pt and BS increased to 168,  on arrival to ER BS 67.  Pt able to answer questions slowly

## 2022-10-27 NOTE — H&P
Hospitalist Admission Note    NAME:  Yas Aguilar   :   1980   MRN:   [id number]     Date of admit: 10/27/2022    PCP: Inell Dakins, MD    Assessment/Plan:     Acute metabolic encephalopathy POA due to hypoglycemia  Hypoglycemia BS 22 at home POA  DM type 1 POA still using home insulin pump  Hypothermia temp 90.6 POA likely environmental due to hypoglycemia  Several recent admits for DKA, last 10/16 to   Complicated by sepsis, left CP, hypoglycemia   Seen by Dr Luis Miguel Shane from endocrinology   Planned to transition from insulin pump to lantus 10-12 units SQ daily at discharge    Outpatient follow up to decide if to resume the pump  Eating well  Reports he did not start lantus yet at home, had just picked it up   Was using insulin pump at time of admission   ER reports the insulin pump was still attached upon arrival to ED, was removed in ED  Woke up this AM on floor with EMS in room, no recollection of what happened   BS 22 per ED notes  Treated and eat,BS improved, then dropped again to 50, we were called to admit the patient  D10 gtt  Admit to stepdown, check blood sugars every hour  Resume lantus at night at 6 units plus SSI   Need to prevent DKA  ? Trigger sepsis  Diabetic teaching  Last A1c= 7.2 on 2022  Resume diabetic diet  Wean D10W off, goal BS < 180  Endocrine consult in AM     Left sided chest pain ? Pleurisy vs musculoskeletal POA  Elevated HS troponin 137 last admit --> now 27  Moderate left effusion last admit POA  Left CP noted last admit  Pleural effusion left x months   Left thoracentesis 2022 ~ 820 cc cloudy, straw fluid removed    3385 nuc cells, 79% PMNs, 7% lymphs, 13% macrophages    TP 2.6 (serum 5.9)        Occasional WBC, NOS, negative cultures    No cytology sent  Seen by cardiology and pulmonology last admit  NM cardiac stress test 10/20/2022 revealed non diagnotic EKG findings   NM imaging revealed no ischemia or infarct demonstrated.  LVEF 51%.  CTA chest 10/24/2022 IMPRESSION  1. No evidence of pulmonary embolism. 2. Moderate left and small right pleural effusions with overlying bilateral  lower lobe subsegmental atelectasis. Mild pulmonary interstitial edema. 3. Distended fluid and air-filled esophagus. Echo TTE 10/25/2022   LVEF 45 to 50%, normal wall motion   Normal RV size and function   Trace MR, no MS, No AS   No mention of pericardium  US left chest IMPRESSION  Trace fluid. Thoracentesis not performed. Admit pCXR 10/27 IMPRESSION  Small to moderate new left pleural effusion. Better with lidocaine patch, will resume   PRN morphine  Volume removed with HD  Reassess if enough fluid present for thoracentesis,     Recent Sepsis last admit POA WBC 14.8, 106, lactate 1.6, procalcitonin 12.51  CXR with no ASD or edema, + left   CT abdomen/pelvis IMPRESSION  1. Questionable mild diffuse colitis. 2. Moderate distal esophagitis. 3. Trace pelvic free fluid. 4. Moderate diffuse bladder wall thickening. UA 80 mg% ketones, 5 to 10 WBC, 5 to 10 RBCs, negative bacteria  blood and urine cultures NGTD  S/P empiric ceftriaxone and vancomycin   No current SIRS criteria  Check follow up procalcitonin     ESRD POA  Recently started on HD, MWF on 9/7/2022  Nephrology consulted, c/w scheduled HD     Severe esophageal/gastric inflammation POA  Seen on previous EGD   PPI     Essential HTN POA  C/w Amlodipine, c/w clonidine patch  Cont coreg  Hydralazine PRN added     Recent urinary retention POA hansen removed last month  Still makes significant urine  Hansen in placed 1 month in august/September 2022  Urology concerned for ?? BPH and atonic bladder per consult  Removed mid September 2022,  follows with [facility] urology     Depression  Patient admits to being depressed  Saw psychiatry last admit    Given the patient's current clinical presentation, I have a high level of concern for decompensation if discharged from the emergency department.   My assessment of this patient's clinical condition and my plan of care is as noted above. DVT prophylaxis with heparin SQ    Code status: Full code  NOK: Mother    History     CHIEF COMPLAINT: Found unresponsive at home this AM with BS of 22    HISTORY OF PRESENT ILLNESS:    46 y.o. male    Known DM type 1 with renal complications    ESRD due to diabetic nephrology  Recently started on HD several months ago    Several recent admits for DKA, often due insulin pump issues  Last Admit 10/16 to 38/72/8884  Complicated by sepsis, left CP, hypoglycemia   Seen by Dr Jina Cushing from endocrinology   Planned to transition from insulin pump to lantus 10-12 units SQ daily at discharge    Outpatient follow up to decide if to resume the pump  Eating well  Reports he did not start lantus yet at home, had just picked it up   Was using insulin pump at time of admission  ER reports the insulin pump was still attached upon arrival to ED, was removed in ED  Woke up this AM on floor with EMS in room, no recollection of what happened   BS 22 per ED notes  Blood sugar on arrival to the ED was 68.   Patient alert and oriented, but slow  Treated and eat in ED, BS  then dropped again to 50, we were called to admit the patient  D10 gtt started  Currently alert, oriented, follows commands  Main complaint is moderate left lateral CP, present since last admit  Denies fevers, chills, headache, SOB, nausea, vomiting, diarrhea, abd pain, changes in BM, dysuria  + mild cough  We were called to admit the patient    Past Medical History:   Diagnosis Date    Bipolar 1 disorder, depressed ([facility] [address]     Bipolar disorder ([facility] [address]     Chronic kidney disease, stage 3a ([address]     Depression     Diabetes ([facility] [address]     DKA, type 1 ([facility] [address] 1/27/2013    diagnosed age 23    DKA, type 1 ([address]     H/O noncompliance with medical treatment, presenting hazards to health     MRSA (methicillin resistant staph aureus) culture positive     MRSA (methicillin resistant Staphylococcus aureus)     Face Noncompliance with medication regimen     Second hand smoke exposure     Seizure ([facility] [address]     Seizures ([facility] [address] 2006 or 2007    one episode during prison        Past Surgical History:   Procedure Laterality Date    HX HEENT      top left wisdom tooth    HX ORTHOPAEDIC Left     wrist; MCV    IR INSERT NON TUNL CVC OVER 5 YRS  2022    IR INSERT TUNL CVC W/O PORT OVER 5 YR  2022    UPPER GI ENDOSCOPY,BIOPSY  2018            Social History     Tobacco Use    Smoking status: Former     Packs/day: 0.10     Years: 16.00     Pack years: 1.60     Types: Cigarettes     Start date: 10/4/1999     Quit date: 2020     Years since quittin.6    Smokeless tobacco: Never   Substance Use Topics    Alcohol use: No        Family History   Problem Relation Age of Onset    Diabetes Mother     Diabetes Other         neice, type 1         No Known Allergies     Prior to Admission medications    Medication Sig Start Date End Date Taking? Authorizing Provider   hydrALAZINE (APRESOLINE) 50 mg tablet Take 1 Tablet by mouth three (3) times daily. 10/25/22   Brandy Sunshine MD   insulin glargine (LANTUS) 100 unit/mL injection 10 Units by SubCUTAneous route every Tuesday, Thursday, Saturday & . 10/25/22   Brandy Sunshine MD   insulin glargine (LANTUS) 100 unit/mL injection 8 Units by SubCUTAneous route every Monday, Wednesday, Friday. 10/26/22   Brandy Sunshine MD   insulin lispro (HUMALOG) 100 unit/mL injection 1 Units by SubCUTAneous route Before breakfast, lunch, and dinner. 10/25/22   Brandy Sunshine MD   DULoxetine (CYMBALTA) 60 mg capsule Take 1 capsule by mouth once daily 10/6/22   Pineda Alonso MD   naloxone (Narcan) 4 mg/actuation nasal spray Use 1 spray intranasally, then discard. Repeat with new spray every 2 min as needed for opioid overdose symptoms, alternating nostrils. 22   Pineda Alonso MD   rosuvastatin (CRESTOR) 10 mg tablet Take 1 Tablet by mouth nightly for 60 days.  22 Carmen Marquis MD   bacitracin zinc (BACITRACIN) ointment Apply  to affected area two (2) times a day. 8/25/22   Yumi Holly MD   Ketone Blood Test strp 50 Each by Does Not Apply route as needed for PRN Reason (Other) (as needed for suspected dka). 8/23/22   Belen Wiley MD   pantoprazole (PROTONIX) 40 mg tablet Take 1 Tablet by mouth Daily (before breakfast). 8/21/22   Kaela Hutton MD   finasteride (PROSCAR) 5 mg tablet Take 1 Tablet by mouth in the morning. 7/25/22   Yumi Holly MD   pregabalin (Lyrica) 75 mg capsule Take 1 Capsule by mouth two (2) times a day. Max Daily Amount: 150 mg. 7/14/22   Yumi Holly MD   [address] (Ultra-Light Rollator) misc Use while ambulating 7/14/22   Yumi Holly MD   amLODIPine (NORVASC) 10 mg tablet Take 1 Tablet by mouth daily. 7/14/22   Yumi Holly MD   cloNIDine (CATAPRES) 0.1 mg/24 hr ptwk 1 Patch by TransDERmal route every seven (7) days. 7/14/22   Yumi Holly MD   Dexcom G6  misc Use with the dexcom device to check blood sugars 4 times a day 7/1/22   Belen Wiley MD   Dexcom G6 Sensor brandi Use as directed every 10 days 6/21/22   Belen Wiley MD   Dexcom G6 Transmitter brandi Use as directed 6/21/22   Belen Wiley MD   Insulin Needles, Disposable, 31 gauge x 5/16\" ndle Dx code E11.65, use 6x daily 6/21/22   Belen Wiley MD   carvediloL (COREG) 12.5 mg tablet Take 1 Tablet by mouth two (2) times daily (with meals). 5/19/22   Yumi Holly MD   tamsulosin (FLOMAX) 0.4 mg capsule Take 0.4 mg by mouth daily.     Provider, Historical       Review of symptoms:     POSITIVE= Bold  Negative = not bold  General:  fever, chills, sweats, generalized weakness, weight loss     loss of appetite   Eyes:    blurred vision, eye pain, double vision  ENT:    Coryza, sore throat, trouble swallowing  Respiratory:   cough, sputum, SOB  Cardiology:   Left chest pain, orthopnea, PND, edema  Gastrointestinal:  abdominal pain , N/V, diarrhea, constipation, melena or BRBPR  Genitourinary:  Urgency, dysuria, hematuria  Muskuloskeletal :  Joint redness, swelling or acute joint pain, myalgias  Hematology:  easy bruising, nose or gum bleeding  Dermatological: rash, ulceration  Endocrine:   Polyuria or polydipsia, heat or hold intolerance  Neurological:  Headache, focal motor or sensory changes, Unresponsive     Speech difficulties, memory loss  Psychological: depression, agitation      Objective:   VITALS:    Patient Vitals for the past 24 hrs:   Temp Pulse Resp BP SpO2   10/27/22 1210 98 °F (36.7 °C) 81 17 (!) 187/98 98 %   10/27/22 1052 -- 71 22 (!) 178/98 98 %   10/27/22 0922 -- 69 13 (!) 165/90 95 %   10/27/22 0807 -- 66 13 (!) 151/89 97 %   10/27/22 0700 97.2 °F (36.2 °C) 62 12 (!) 156/83 97 %   10/27/22 0533 -- (!) 56 18 (!) 144/80 94 %   10/27/22 0518 -- (!) 56 14 (!) 150/84 97 %   10/27/22 0507 (!) 92.3 °F (33.5 °C) -- -- -- --   10/27/22 0506 -- -- -- -- 97 %   10/27/22 0456 -- (!) 53 16 135/83 97 %   10/27/22 0441 -- (!) 53 14 134/85 95 %   10/27/22 0426 -- (!) 52 12 (!) 145/102 97 %   10/27/22 0411 -- (!) 52 9 (!) 145/85 97 %   10/27/22 0356 -- (!) 51 10 (!) 151/87 98 %   10/27/22 0341 -- (!) 52 11 (!) 186/89 99 %   10/27/22 0256 -- (!) 53 10 (!) 161/82 98 %   10/27/22 0241 -- (!) 50 11 (!) 149/86 98 %   10/27/22 0231 (!) 90.6 °F (32.6 °C) -- -- -- --   10/27/22 0225 -- (!) 53 17 (!) 175/98 97 %     Temp (24hrs), Av.5 °F (34.7 °C), Min:90.6 °F (32.6 °C), Max:98 °F (36.7 °C)      O2 Device: None    Wt Readings from Last 12 Encounters:   10/25/22 69.2 kg (152 lb 8.9 oz)   22 75.8 kg (167 lb 3.2 oz)   22 72.1 kg (159 lb)   22 81.6 kg (180 lb)   22 83.2 kg (183 lb 6.4 oz)   22 91.4 kg (201 lb 8 oz)   22 82.7 kg (182 lb 5.1 oz)   22 73.9 kg (163 lb)   07/10/22 63.5 kg (140 lb)   22 71.6 kg (157 lb 13.6 oz)   22 66.5 kg (146 lb 9.6 oz)   22 72.6 kg (160 lb)         PHYSICAL EXAM: I had a face to face encounter and independently examined this patient on 10/27/22  as outlined below:    General:    Alert, cooperative in no distress     HEENT: Normocephalic, atraumatic    PERRL,   Sclera no icterus    Nasal mucosa without masses or discharge  Hearing intact to voice    Oropharynx without erythema or exudate   Neck:  No meningismus, trachea midline, no carotid bruits     Thyroid not enlarged, no nodules or tenderness  Lungs:   Few crackles bilaterally. No wheezing    No accessory muscle use or retractions. Heart:   Regular rate and rhythm,  no murmur or gallop. No LE edema   Abdomen:   Soft, non-tender. Not distended. Bowel sounds normal.     No masses, No Hepatosplenomegaly, No Rebound or guarding  Lymph nodes: No cervical or inguinal JACI  Musculoskeletal:  No Joint swelling, erythema, warmth.  No Cyanosis or clubbing  Skin:      No rashes      Not Jaundiced   No nodules or thickening    Capillary refill normal  Neurologic: Alert and oriented X 3, follows commands     Cranial nerves 2 to 12 intact    Symmetric motor strength bilaterally       LAB DATA REVIEWED:    Recent Results (from the past 12 hour(s))   GLUCOSE, POC    Collection Time: 10/27/22  2:10 AM   Result Value Ref Range    Glucose (POC) 65 65 - 117 mg/dL    Performed by Sindi Earl RN    GLUCOSE, POC    Collection Time: 10/27/22  2:34 AM   Result Value Ref Range    Glucose (POC) 58 (L) 65 - 117 mg/dL    Performed by Marvine Dubin \"Bronwyn\" RN    CBC WITH AUTOMATED DIFF    Collection Time: 10/27/22  2:37 AM   Result Value Ref Range    WBC 5.1 4.1 - 11.1 K/uL    RBC 5.45 4.10 - 5.70 M/uL    HGB 14.2 12.1 - 17.0 g/dL    HCT 45.1 36.6 - 50.3 %    MCV 82.8 80.0 - 99.0 FL    MCH 26.1 26.0 - 34.0 PG    MCHC 31.5 30.0 - 36.5 g/dL    RDW 18.5 (H) 11.5 - 14.5 %    PLATELET 769 737 - 446 K/uL    MPV 9.7 8.9 - 12.9 FL    NRBC 0.0 0  WBC    ABSOLUTE NRBC 0.00 0.00 - 0.01 K/uL    NEUTROPHILS 63 32 - 75 %    LYMPHOCYTES 21 12 - 49 %    MONOCYTES 10 5 - 13 %    EOSINOPHILS 5 0 - 7 %    BASOPHILS 1 0 - 1 %    IMMATURE GRANULOCYTES 0 0.0 - 0.5 %    ABS. NEUTROPHILS 3.2 1.8 - 8.0 K/UL    ABS. LYMPHOCYTES 1.1 0.8 - 3.5 K/UL    ABS. MONOCYTES 0.5 0.0 - 1.0 K/UL    ABS. EOSINOPHILS 0.3 0.0 - 0.4 K/UL    ABS. BASOPHILS 0.0 0.0 - 0.1 K/UL    ABS. IMM. GRANS. 0.0 0.00 - 0.04 K/UL    DF AUTOMATED     METABOLIC PANEL, COMPREHENSIVE    Collection Time: 10/27/22  2:37 AM   Result Value Ref Range    Sodium 134 (L) 136 - 145 mmol/L    Potassium 3.9 3.5 - 5.1 mmol/L    Chloride 100 97 - 108 mmol/L    CO2 24 21 - 32 mmol/L    Anion gap 10 5 - 15 mmol/L    Glucose 63 (L) 65 - 100 mg/dL    BUN 35 (H) 6 - 20 MG/DL    Creatinine 4.85 (H) 0.70 - 1.30 MG/DL    BUN/Creatinine ratio 7 (L) 12 - 20      eGFR 15 (L) >60 ml/min/1.73m2    Calcium 9.5 8.5 - 10.1 MG/DL    Bilirubin, total 0.2 0.2 - 1.0 MG/DL    ALT (SGPT) 28 12 - 78 U/L    AST (SGOT) 20 15 - 37 U/L    Alk. phosphatase 150 (H) 45 - 117 U/L    Protein, total 6.5 6.4 - 8.2 g/dL    Albumin 2.6 (L) 3.5 - 5.0 g/dL    Globulin 3.9 2.0 - 4.0 g/dL    A-G Ratio 0.7 (L) 1.1 - 2.2     SAMPLES BEING HELD    Collection Time: 10/27/22  2:47 AM   Result Value Ref Range    SAMPLES BEING HELD PST [facility]     COMMENT        Add-on orders for these samples will be processed based on acceptable specimen integrity and analyte stability, which may vary by analyte.    POC CHEM8    Collection Time: 10/27/22  2:47 AM   Result Value Ref Range    CO2, POC 24 19 - 24 MMOL/L    Glucose, POC 61 (L) 74 - 106 MG/DL    Creatinine, POC 4.9 (H) 0.6 - 1.3 MG/DL    eGFR (POC) 15 (L) >60 ml/min/1.73m2    Sodium,  (L) 136 - 145 MMOL/L    Potassium, POC 4.0 3.5 - 5.5 MMOL/L    Calcium, ionized (POC) 1.31 1.12 - 1.32 mmol/L    Chloride, POC 99 (L) 100 - 108 MMOL/L    Anion gap, POC 11 10 - 20     POC LACTIC ACID    Collection Time: 10/27/22  2:47 AM   Result Value Ref Range    Lactic Acid (POC) 0.79 0.40 - 2.00 mmol/L   TROPONIN-HIGH SENSITIVITY    Collection Time: 10/27/22  2:47 AM   Result Value Ref Range    Troponin-High Sensitivity 31 0 - 76 ng/L   GLUCOSE, POC    Collection Time: 10/27/22  2:52 AM   Result Value Ref Range    Glucose (POC) 79 65 - 117 mg/dL    Performed by Ivelisse REHMAN    EKG, 12 LEAD, INITIAL    Collection Time: 10/27/22  2:58 AM   Result Value Ref Range    Ventricular Rate 51 BPM    Atrial Rate 51 BPM    P-R Interval 174 ms    QRS Duration 106 ms    Q-T Interval 520 ms    QTC Calculation (Bezet) 479 ms    Calculated P Axis 54 degrees    Calculated R Axis -54 degrees    Calculated T Axis 71 degrees    Diagnosis       Sinus bradycardia  Left anterior fascicular block  When compared with ECG of 16-OCT-2022 12:39,  Vent.  rate has decreased BY  54 BPM  Left anterior fascicular block is now present  T wave amplitude has decreased in Inferior leads  Confirmed by Gio Fierro ([id number]) on 10/27/2022 10:12:15 AM     GLUCOSE, POC    Collection Time: 10/27/22  3:09 AM   Result Value Ref Range    Glucose (POC) 86 65 - 117 mg/dL    Performed by Georgette Peña \"Bronwyn\" RN    GLUCOSE, POC    Collection Time: 10/27/22  3:26 AM   Result Value Ref Range    Glucose (POC) 93 65 - 117 mg/dL    Performed by Archana REHMAN    GLUCOSE, POC    Collection Time: 10/27/22  4:02 AM   Result Value Ref Range    Glucose (POC) 105 65 - 117 mg/dL    Performed by Ivelisse REHMAN    GLUCOSE, POC    Collection Time: 10/27/22  5:09 AM   Result Value Ref Range    Glucose (POC) 95 65 - 117 mg/dL    Performed by Araceli Wellington" RN    POC LACTIC ACID    Collection Time: 10/27/22  6:21 AM   Result Value Ref Range    Lactic Acid (POC) 0.61 0.40 - 2.00 mmol/L   TROPONIN-HIGH SENSITIVITY    Collection Time: 10/27/22  6:24 AM   Result Value Ref Range    Troponin-High Sensitivity 27 0 - 76 ng/L   GLUCOSE, POC    Collection Time: 10/27/22  7:03 AM   Result Value Ref Range    Glucose (POC) 73 65 - 117 mg/dL    Performed by Georgette Peña \"Bronwyn\" RN    DRUG SCREEN, URINE    Collection Time: 10/27/22 10:20 AM   Result Value Ref Range    AMPHETAMINES Negative NEG      BARBITURATES Negative NEG      BENZODIAZEPINES Negative NEG      COCAINE Negative NEG      METHADONE Negative NEG      OPIATES Positive (A) NEG      PCP(PHENCYCLIDINE) Negative NEG      THC (TH-CANNABINOL) Negative NEG      Drug screen comment (NOTE)    URINALYSIS W/ REFLEX CULTURE    Collection Time: 10/27/22 10:20 AM    Specimen: Urine   Result Value Ref Range    Color YELLOW/STRAW      Appearance CLEAR CLEAR      Specific gravity 1.016      pH (UA) 7.5 5.0 - 8.0      Protein >300 (A) NEG mg/dL    Glucose 250 (A) NEG mg/dL    Ketone Negative NEG mg/dL    Bilirubin Negative NEG      Blood TRACE (A) NEG      Urobilinogen 0.2 0.2 - 1.0 EU/dL    Nitrites Negative NEG      Leukocyte Esterase SMALL (A) NEG      WBC 10-20 0 - 4 /hpf    RBC 5-10 0 - 5 /hpf    Epithelial cells FEW FEW /lpf    Bacteria 1+ (A) NEG /hpf    UA:UC IF INDICATED URINE CULTURE ORDERED (A) CNI      Hyaline cast 2-5 0 - 5 /lpf   GLUCOSE, POC    Collection Time: 10/27/22 10:21 AM   Result Value Ref Range    Glucose (POC) 50 (LL) 65 - 117 mg/dL    Performed by PATRICIA MUELLER    GLUCOSE, POC    Collection Time: 10/27/22 11:56 AM   Result Value Ref Range    Glucose (POC) 87 65 - 117 mg/dL    Performed by Jose Diaz (EDT)          CT Results  (Last 48 hours)      None              XR CHEST PORT    Result Date: 10/27/2022  Small to moderate new left pleural effusion. I saw the patient personally, took a history and did a complete physical exam at the bedside. I performed complex decision making in coming up with a diagnostic and treatment plan for the patient. I reviewed the patient's past medical records, current laboratory and radiology results, and actual Xray films/EKG. I have also discussed this case with the involved ED physician.     Care Plan discussed with:    Patient, ED Doc    Risk of deterioration: High    Total Time Coordinating Admission:  65   minutes    Total Critical Care Time:         Wojciech Martinez MD

## 2022-10-27 NOTE — DISCHARGE INSTRUCTIONS
It was a pleasure taking care of you in our Emergency Department today. We know that when you come to [facility], you are entrusting us with your health, comfort, and safety. Our physicians and nurses honor that trust, and truly appreciate the opportunity to care for you and your loved ones. We also value your feedback. If you receive a survey about your Emergency Department experience today, please fill it out. We care about our patients' feedback, and we listen to what you have to say. Thank you!       Dr. Mihaela Gutierrez MD.

## 2022-10-27 NOTE — Clinical Note
Patient Class[de-identified] OBSERVATION [140]   Type of Bed: Medical [8]   Cardiac Monitoring Required?: No   Reason for Observation: hypoglycemia   Admitting Diagnosis: Hypoglycemia [[id number]]   Admitting Physician: Km Aguilera   Attending Physician: Km Aguilera

## 2022-10-27 NOTE — Clinical Note
[address]  [facility] EMERGENCY DEPT  [address] [address]  [phone]    Work/School Note    Date: 10/27/2022    To Whom It May concern:      Tennille Lozada was seen and treated today in the emergency room by the following provider(s):  Attending Provider: Codie Jackson MD.      Tennille Lozada is excused from work/school on 10/27/22. He is clear to return to work/school on 10/28/22.         Sincerely,          Chasity Issa MD

## 2022-10-27 NOTE — ED NOTES
Bedside shift change report given to [address] (oncoming nurse) by Jefferson Roberts (offgoing nurse). Report included the following information SBAR, Kardex and ED Summary.

## 2022-10-27 NOTE — ED NOTES
Bs 50- gave two po orange juice, removed the patients insulin pump, patient complaining of chest pain.

## 2022-10-27 NOTE — PROGRESS NOTES
Nephrology Progress Note  Formerly [facility] / [address], Vanessa Ferraro, [address]  Phone - [phone]  Fax - [phone]                 Patient: Yas Aguilar                   YOB: 1980        Date- 10/27/2022                      Admit Date: 10/27/2022  CC: Follow up for ESRD          IMPRESSION & PLAN:   ESRD- hd mwf at [facility],  patient detail  History of recurrent DKA   Hypoglycemia  Hyponatremia  Elevated troponins  H/o Urinary retention requiring hansen cath  Type 1 diabetes  Hypertension  Anemia  Bilateral pleural effusion      PLAN-  Plan for hemodialysis today  Will keep him on TTS schedule while being inpatient  [facility] team is been notified  Continue with D10 gently for hypoglycemia. Subjective: Interval History:   -Patient was seen and examined in the ED. He is well-known to me from his recurrent previous admissions. He presented to the ED today with episodes of altered mental status and hypoglycemia. He was just discharged a day ago and failed to show for dialysis yesterday. Renal consult is requested for evaluation management of ESRD. Objective:   Vitals:    10/27/22 1210 10/27/22 1228 10/27/22 1334 10/27/22 1345   BP: (!) 187/98 (!) 187/89 (!) 155/85 (!) 153/82   Pulse: 81 76 81 76   Resp: 17  18 12   Temp: 98 °F (36.7 °C)  98 °F (36.7 °C)    TempSrc:   Oral    SpO2: 98%   97%      No intake/output data recorded. There were no vitals filed for this visit. Physical exam:    GEN: NAD  NECK- no mass  RESP: No wheezing, decreased BS b/l  CVS: S1,S2  RRR  NEURO: Normal speech, Non focal  EXT: No Edema   PSYCH: Normal Mood  HD access: Right chest permacath    Chart reviewed. Pertinent Notes reviewed.      Data Review :  Recent Labs     10/27/22  0237   *   K 3.9      CO2 24   BUN 35*   CREA 4.85*   GLU 63*   CA 9.5     Recent Labs     10/27/22  0237   WBC 5.1 HGB 14.2   HCT 45.1        No results for input(s): FE, TIBC, PSAT, FERR in the last 72 hours.    Lab Results   Component Value Date/Time    Hemoglobin A1c 7.2 (H) 09/26/2022 09:34 AM    Hemoglobin A1c 8.7 (H) 08/12/2022 12:41 AM    Hemoglobin A1c 12.1 (H) 07/10/2022 07:51 PM      Lab Results   Component Value Date/Time    Microalbumin/Creat ratio (mg/g creat) 11,439 (H) 04/01/2021 10:00 AM    Microalbumin,urine random 151.00 04/01/2021 10:00 AM     Lab Results   Component Value Date/Time    NT pro-BNP 3,665 (H) 10/22/2022 02:12 AM    NT pro-BNP 5,688 (H) 09/06/2022 09:16 AM    NT pro-BNP 11,477 (H) 08/16/2022 12:50 AM    NT pro-BNP 4,600 (H) 08/09/2022 02:13 PM    NT pro- (H) 07/06/2021 09:52 AM     US Results (most recent):  Medication list  reviewed  Current Facility-Administered Medications   Medication Dose Route Frequency    dextrose 10% infusion 0-250 mL  0-250 mL IntraVENous PRN    dextrose 10% infusion  50 mL/hr IntraVENous CONTINUOUS    glucose chewable tablet 16 g  4 Tablet Oral PRN    glucagon (GLUCAGEN) injection 1 mg  1 mg IntraMUSCular PRN    dextrose 10 % infusion 0-250 mL  0-250 mL IntraVENous PRN    hydrALAZINE (APRESOLINE) 20 mg/mL injection 20 mg  20 mg IntraVENous Q6H PRN    morphine injection 2 mg  2 mg IntraVENous Q3H PRN    sodium chloride (NS) flush 5-40 mL  5-40 mL IntraVENous Q8H    sodium chloride (NS) flush 5-40 mL  5-40 mL IntraVENous PRN    acetaminophen (TYLENOL) tablet 650 mg  650 mg Oral Q6H PRN    Or    acetaminophen (TYLENOL) suppository 650 mg  650 mg Rectal Q6H PRN    [START ON 10/28/2022] polyethylene glycol (MIRALAX) packet 17 g  17 g Oral DAILY    bisacodyL (DULCOLAX) tablet 5 mg  5 mg Oral DAILY PRN    promethazine (PHENERGAN) tablet 12.5 mg  12.5 mg Oral Q6H PRN    Or    ondansetron (ZOFRAN) injection 4 mg  4 mg IntraVENous Q6H PRN    heparin (porcine) injection 5,000 Units  5,000 Units SubCUTAneous Q8H    [START ON 10/28/2022] amLODIPine (NORVASC) tablet 10 mg  10 mg Oral DAILY    cloNIDine (CATAPRES) 0.1 mg/24 hr patch 1 Patch  1 Patch TransDERmal Q7D    carvediloL (COREG) tablet 12.5 mg  12.5 mg Oral BID WITH MEALS    [START ON 10/28/2022] DULoxetine (CYMBALTA) capsule 60 mg  60 mg Oral DAILY    [START ON 10/28/2022] finasteride (PROSCAR) tablet 5 mg  5 mg Oral DAILY    [START ON 10/28/2022] pantoprazole (PROTONIX) tablet 40 mg  40 mg Oral ACB    pregabalin (LYRICA) capsule 75 mg  75 mg Oral BID    [START ON 10/28/2022] tamsulosin (FLOMAX) capsule 0.4 mg  0.4 mg Oral DAILY    rosuvastatin (CRESTOR) tablet 10 mg  10 mg Oral QHS    [START ON 10/28/2022] aspirin chewable tablet 81 mg  81 mg Oral DAILY     Current Outpatient Medications   Medication Sig    hydrALAZINE (APRESOLINE) 50 mg tablet Take 1 Tablet by mouth three (3) times daily. insulin glargine (LANTUS) 100 unit/mL injection 10 Units by SubCUTAneous route every Tuesday, Thursday, Saturday & Sunday. insulin glargine (LANTUS) 100 unit/mL injection 8 Units by SubCUTAneous route every Monday, Wednesday, Friday. insulin lispro (HUMALOG) 100 unit/mL injection 1 Units by SubCUTAneous route Before breakfast, lunch, and dinner. DULoxetine (CYMBALTA) 60 mg capsule Take 1 capsule by mouth once daily    naloxone (Narcan) 4 mg/actuation nasal spray Use 1 spray intranasally, then discard. Repeat with new spray every 2 min as needed for opioid overdose symptoms, alternating nostrils. rosuvastatin (CRESTOR) 10 mg tablet Take 1 Tablet by mouth nightly for 60 days. bacitracin zinc (BACITRACIN) ointment Apply  to affected area two (2) times a day. Ketone Blood Test strp 50 Each by Does Not Apply route as needed for PRN Reason (Other) (as needed for suspected dka). pantoprazole (PROTONIX) 40 mg tablet Take 1 Tablet by mouth Daily (before breakfast). finasteride (PROSCAR) 5 mg tablet Take 1 Tablet by mouth in the morning. pregabalin (Lyrica) 75 mg capsule Take 1 Capsule by mouth two (2) times a day. Max Daily Amount: 150 mg. Walker (Ultra-Light Rollator) misc Use while ambulating    amLODIPine (NORVASC) 10 mg tablet Take 1 Tablet by mouth daily. cloNIDine (CATAPRES) 0.1 mg/24 hr ptwk 1 Patch by TransDERmal route every seven (7) days. Dexcom G6  misc Use with the dexcom device to check blood sugars 4 times a day    Dexcom G6 Sensor brandi Use as directed every 10 days    Dexcom G6 Transmitter brandi Use as directed    Insulin Needles, Disposable, 31 gauge x 5/16\" ndle Dx code E11.65, use 6x daily    carvediloL (COREG) 12.5 mg tablet Take 1 Tablet by mouth two (2) times daily (with meals). tamsulosin (FLOMAX) 0.4 mg capsule Take 0.4 mg by mouth daily.         Karri Mckeon MD  10/27/2022

## 2022-10-28 VITALS
HEART RATE: 77 BPM | SYSTOLIC BLOOD PRESSURE: 159 MMHG | RESPIRATION RATE: 18 BRPM | DIASTOLIC BLOOD PRESSURE: 95 MMHG | TEMPERATURE: 98.4 F | OXYGEN SATURATION: 98 %

## 2022-10-28 LAB
ALBUMIN SERPL-MCNC: 2 G/DL (ref 3.5–5)
ALBUMIN/GLOB SERPL: 0.6 {RATIO} (ref 1.1–2.2)
ALP SERPL-CCNC: 134 U/L (ref 45–117)
ALT SERPL-CCNC: 22 U/L (ref 12–78)
ANION GAP SERPL CALC-SCNC: 8 MMOL/L (ref 5–15)
AST SERPL-CCNC: 12 U/L (ref 15–37)
BACTERIA SPEC CULT: NORMAL
BASOPHILS # BLD: 0 K/UL (ref 0–0.1)
BASOPHILS NFR BLD: 1 % (ref 0–1)
BILIRUB SERPL-MCNC: 0.5 MG/DL (ref 0.2–1)
BUN SERPL-MCNC: 20 MG/DL (ref 6–20)
BUN/CREAT SERPL: 6 (ref 12–20)
CALCIUM SERPL-MCNC: 8.1 MG/DL (ref 8.5–10.1)
CHLORIDE SERPL-SCNC: 92 MMOL/L (ref 97–108)
CO2 SERPL-SCNC: 26 MMOL/L (ref 21–32)
CREAT SERPL-MCNC: 3.11 MG/DL (ref 0.7–1.3)
DIFFERENTIAL METHOD BLD: ABNORMAL
EOSINOPHIL # BLD: 0.2 K/UL (ref 0–0.4)
EOSINOPHIL NFR BLD: 4 % (ref 0–7)
ERYTHROCYTE [DISTWIDTH] IN BLOOD BY AUTOMATED COUNT: 17.8 % (ref 11.5–14.5)
GLOBULIN SER CALC-MCNC: 3.2 G/DL (ref 2–4)
GLUCOSE BLD STRIP.AUTO-MCNC: 192 MG/DL (ref 65–117)
GLUCOSE BLD STRIP.AUTO-MCNC: 226 MG/DL (ref 65–117)
GLUCOSE BLD STRIP.AUTO-MCNC: 257 MG/DL (ref 65–117)
GLUCOSE BLD STRIP.AUTO-MCNC: 326 MG/DL (ref 65–117)
GLUCOSE BLD STRIP.AUTO-MCNC: 436 MG/DL (ref 65–117)
GLUCOSE SERPL-MCNC: 374 MG/DL (ref 65–100)
HCT VFR BLD AUTO: 36.7 % (ref 36.6–50.3)
HGB BLD-MCNC: 11.7 G/DL (ref 12.1–17)
IMM GRANULOCYTES # BLD AUTO: 0 K/UL (ref 0–0.04)
IMM GRANULOCYTES NFR BLD AUTO: 0 % (ref 0–0.5)
LYMPHOCYTES # BLD: 1.1 K/UL (ref 0.8–3.5)
LYMPHOCYTES NFR BLD: 18 % (ref 12–49)
MCH RBC QN AUTO: 26.1 PG (ref 26–34)
MCHC RBC AUTO-ENTMCNC: 31.9 G/DL (ref 30–36.5)
MCV RBC AUTO: 81.7 FL (ref 80–99)
MONOCYTES # BLD: 0.7 K/UL (ref 0–1)
MONOCYTES NFR BLD: 12 % (ref 5–13)
NEUTS SEG # BLD: 3.9 K/UL (ref 1.8–8)
NEUTS SEG NFR BLD: 65 % (ref 32–75)
NRBC # BLD: 0 K/UL (ref 0–0.01)
NRBC BLD-RTO: 0 PER 100 WBC
PLATELET # BLD AUTO: 351 K/UL (ref 150–400)
PMV BLD AUTO: 10.5 FL (ref 8.9–12.9)
POTASSIUM SERPL-SCNC: 4.4 MMOL/L (ref 3.5–5.1)
PROCALCITONIN SERPL-MCNC: 0.45 NG/ML
PROT SERPL-MCNC: 5.2 G/DL (ref 6.4–8.2)
RBC # BLD AUTO: 4.49 M/UL (ref 4.1–5.7)
SERVICE CMNT-IMP: ABNORMAL
SERVICE CMNT-IMP: NORMAL
SODIUM SERPL-SCNC: 126 MMOL/L (ref 136–145)
TROPONIN-HIGH SENSITIVITY: 17 NG/L (ref 0–76)
WBC # BLD AUTO: 6 K/UL (ref 4.1–11.1)

## 2022-10-28 PROCEDURE — 77010033678 HC OXYGEN DAILY

## 2022-10-28 PROCEDURE — 74011000250 HC RX REV CODE- 250: Performed by: INTERNAL MEDICINE

## 2022-10-28 PROCEDURE — 74011636637 HC RX REV CODE- 636/637: Performed by: INTERNAL MEDICINE

## 2022-10-28 PROCEDURE — 84484 ASSAY OF TROPONIN QUANT: CPT

## 2022-10-28 PROCEDURE — 74011250636 HC RX REV CODE- 250/636: Performed by: INTERNAL MEDICINE

## 2022-10-28 PROCEDURE — 94760 N-INVAS EAR/PLS OXIMETRY 1: CPT

## 2022-10-28 PROCEDURE — 74011250637 HC RX REV CODE- 250/637: Performed by: INTERNAL MEDICINE

## 2022-10-28 PROCEDURE — G0378 HOSPITAL OBSERVATION PER HR: HCPCS

## 2022-10-28 PROCEDURE — 36415 COLL VENOUS BLD VENIPUNCTURE: CPT

## 2022-10-28 PROCEDURE — 84145 PROCALCITONIN (PCT): CPT

## 2022-10-28 PROCEDURE — 96372 THER/PROPH/DIAG INJ SC/IM: CPT

## 2022-10-28 PROCEDURE — 82962 GLUCOSE BLOOD TEST: CPT

## 2022-10-28 PROCEDURE — 96376 TX/PRO/DX INJ SAME DRUG ADON: CPT

## 2022-10-28 PROCEDURE — 80053 COMPREHEN METABOLIC PANEL: CPT

## 2022-10-28 PROCEDURE — 85025 COMPLETE CBC W/AUTO DIFF WBC: CPT

## 2022-10-28 RX ORDER — GUAIFENESIN 100 MG/5ML
81 LIQUID (ML) ORAL DAILY
Qty: 30 TABLET | Refills: 0 | Status: ON HOLD | OUTPATIENT
Start: 2022-10-29

## 2022-10-28 RX ORDER — INSULIN PUMP SYRINGE, 3 ML
EACH MISCELLANEOUS
Qty: 1 KIT | Refills: 0 | Status: ON HOLD | OUTPATIENT
Start: 2022-10-28

## 2022-10-28 RX ORDER — LANCETS
EACH MISCELLANEOUS
Qty: 1 EACH | Refills: 11 | Status: ON HOLD | OUTPATIENT
Start: 2022-10-28

## 2022-10-28 RX ORDER — IBUPROFEN 200 MG
CAPSULE ORAL
Qty: 200 STRIP | Refills: 0 | Status: ON HOLD | OUTPATIENT
Start: 2022-10-28

## 2022-10-28 RX ORDER — INSULIN LISPRO 100 [IU]/ML
INJECTION, SOLUTION INTRAVENOUS; SUBCUTANEOUS
Qty: 1 EACH | Refills: 2 | Status: ON HOLD | OUTPATIENT
Start: 2022-10-28

## 2022-10-28 RX ORDER — INSULIN GLARGINE 100 [IU]/ML
INJECTION, SOLUTION SUBCUTANEOUS
Qty: 1 ML | Refills: 2 | Status: ON HOLD | OUTPATIENT
Start: 2022-10-28

## 2022-10-28 RX ADMIN — MORPHINE SULFATE 2 MG: 2 INJECTION, SOLUTION INTRAMUSCULAR; INTRAVENOUS at 06:10

## 2022-10-28 RX ADMIN — CARVEDILOL 12.5 MG: 12.5 TABLET, FILM COATED ORAL at 08:36

## 2022-10-28 RX ADMIN — HYDRALAZINE HYDROCHLORIDE 20 MG: 20 INJECTION INTRAMUSCULAR; INTRAVENOUS at 02:22

## 2022-10-28 RX ADMIN — HEPARIN SODIUM 5000 UNITS: 5000 INJECTION INTRAVENOUS; SUBCUTANEOUS at 06:08

## 2022-10-28 RX ADMIN — MORPHINE SULFATE 2 MG: 2 INJECTION, SOLUTION INTRAMUSCULAR; INTRAVENOUS at 02:14

## 2022-10-28 RX ADMIN — TAMSULOSIN HYDROCHLORIDE 0.4 MG: 0.4 CAPSULE ORAL at 08:37

## 2022-10-28 RX ADMIN — LIDOCAINE HYDROCHLORIDE 40 ML: 20 SOLUTION ORAL; TOPICAL at 14:24

## 2022-10-28 RX ADMIN — AMLODIPINE BESYLATE 10 MG: 5 TABLET ORAL at 08:36

## 2022-10-28 RX ADMIN — SODIUM CHLORIDE, PRESERVATIVE FREE 10 ML: 5 INJECTION INTRAVENOUS at 06:11

## 2022-10-28 RX ADMIN — DULOXETINE HYDROCHLORIDE 60 MG: 30 CAPSULE, DELAYED RELEASE ORAL at 08:36

## 2022-10-28 RX ADMIN — MORPHINE SULFATE 2 MG: 2 INJECTION, SOLUTION INTRAMUSCULAR; INTRAVENOUS at 09:41

## 2022-10-28 RX ADMIN — ASPIRIN 81 MG CHEWABLE TABLET 81 MG: 81 TABLET CHEWABLE at 08:37

## 2022-10-28 RX ADMIN — PREGABALIN 75 MG: 25 CAPSULE ORAL at 08:37

## 2022-10-28 RX ADMIN — Medication 2 UNITS: at 08:36

## 2022-10-28 RX ADMIN — PANTOPRAZOLE SODIUM 40 MG: 40 TABLET, DELAYED RELEASE ORAL at 08:37

## 2022-10-28 RX ADMIN — ONDANSETRON 4 MG: 2 INJECTION INTRAMUSCULAR; INTRAVENOUS at 02:13

## 2022-10-28 RX ADMIN — FINASTERIDE 5 MG: 5 TABLET, FILM COATED ORAL at 08:36

## 2022-10-28 NOTE — DISCHARGE SUMMARY
Hospitalist Discharge Summary     Patient ID:  Mykel Vogt  [id number]  46 y.o.  1980  10/27/2022    PCP on record: Gaurav Houser MD    Admit date: 10/27/2022  Discharge date and time: 10/28/2022    DISCHARGE DIAGNOSIS:    Metabolic encephalopathy/iatrogenic hypoglycemia/hypothermia/left-sided chest pain/pleural effusion/end-stage renal disease on hemodialysis    CONSULTATIONS:  IP CONSULT TO ENDOCRINOLOGY    Excerpted HPI from H&P of Milo Carrasco MD:  46 y.o. male     Known DM type 1 with renal complications     ESRD due to diabetic nephrology  Recently started on HD several months ago     Several recent admits for DKA, often due insulin pump issues  Last Admit 10/16 to 02/51/7070  Complicated by sepsis, left CP, hypoglycemia              Seen by Dr Alexis Meneses from endocrinology              Planned to transition from insulin pump to lantus 10-12 units SQ daily at discharge                          Outpatient follow up to decide if to resume the pump  Eating well  Reports he did not start lantus yet at home, had just picked it up              Was using insulin pump at time of admission  ER reports the insulin pump was still attached upon arrival to ED, was removed in ED  Woke up this AM on floor with EMS in room, no recollection of what happened              BS 22 per ED notes  Blood sugar on arrival to the ED was 68.   Patient alert and oriented, but slow  Treated and eat in ED, BS  then dropped again to 50, we were called to admit the patient  Reported taking lantus in evening, last dose last night              ER reports patient had the insulin pump attached upon arrival to ED, was removed  D10 gtt started  Currently alert, oriented, follows commands  Main complaint is moderate left lateral CP, present since last admit  Denies fevers, chills, headache, SOB, nausea, vomiting, diarrhea, abd pain, changes in BM, dysuria  + mild cough  We were called to admit the patient    ______________________________________________________________________  DISCHARGE SUMMARY/HOSPITAL COURSE:  for full details see H&P, daily progress notes, labs, consult notes. Patient was subsequently admitted to [facility] for evaluation as well as management, patient was initially placed on dextrose drip, started on Lantus 10 units once daily, insulin pump was stopped, patient was symptoms resolved, hypoglycemia resolved, patient's medications were readjusted, overall as patient's clinical status improved patient was deemed stable for discharge on new insulin regimen with close outpatient follow-up with primary care physician as well as endocrinology, the plan is explained to the patient in details agreeable to current plan.          _______________________________________________________________________  Patient seen and examined by me on discharge day. Pertinent Findings:  Gen:    Not in distress  Chest: Clear lungs  CVS:   Regular rhythm, s1/s2 no m/r/g  No edema  Abd:  Soft, not distended, not tender  Neuro:  Alert, Oriented x 4  _______________________________________________________________________  DISCHARGE MEDICATIONS:   Current Discharge Medication List        START taking these medications    Details   aspirin 81 mg chewable tablet Take 1 Tablet by mouth daily. Qty: 30 Tablet, Refills: 0  Start date: 10/29/2022           CONTINUE these medications which have CHANGED    Details   insulin glargine (LANTUS) 100 unit/mL injection Take 10 units once daily at night  Qty: 1 mL, Refills: 2  Start date: 10/28/2022      insulin lispro (HUMALOG) 100 unit/mL injection INITIATE CORRECTIVE INSULIN PROTOCOL (FREDA): High Sensitivity (thin, ESRD): AC (before meals), Q6H CORRECTIONAL SCALE only For Blood Sugar (mg/dl) of :           140-199=0 units          200-249=2 units 250-299=3 units 300-349=4 units 350-400=6 units 401 or greater = Call MD Give in addition to basal medications. Do Not Hold for NPO BEDTIME CORRECTIONAL sliding scale when scheduled: 200-249=1 units 250-349=2 units 350-400=3 units 401 or greater = Call MD Give in addition to basal medications. Do Not Hold for NPO Fast Acting - Administer Immediately - or within 15 minutes of start of meal, if mealtime coverage. Qty: 1 Each, Refills: 2  Start date: 10/28/2022           CONTINUE these medications which have NOT CHANGED    Details   hydrALAZINE (APRESOLINE) 50 mg tablet Take 1 Tablet by mouth three (3) times daily. 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 Repeat with new spray every 2 min as needed for opioid overdose symptoms, alternating nostrils. Qty: 1 Each, Refills: 0    Associated Diagnoses: Chronic midline low back pain without sciatica      rosuvastatin (CRESTOR) 10 mg tablet Take 1 Tablet by mouth nightly for 60 days. Qty: 30 Tablet, Refills: 1      bacitracin zinc (BACITRACIN) ointment Apply  to affected area two (2) times a day. Qty: 28 g, Refills: 4    Associated Diagnoses: Abrasion of scalp, initial encounter      Ketone Blood Test strp 50 Each by Does Not Apply route as needed for PRN Reason (Other) (as needed for suspected dka). Qty: 50 Strip, Refills: 3      pantoprazole (PROTONIX) 40 mg tablet Take 1 Tablet by mouth Daily (before breakfast). Qty: 30 Tablet, Refills: 0      finasteride (PROSCAR) 5 mg tablet Take 1 Tablet by mouth in the morning. Qty: 30 Tablet, Refills: 2      pregabalin (Lyrica) 75 mg capsule Take 1 Capsule by mouth two (2) times a day.  Max Daily Amount: 150 mg.  Qty: 60 Capsule, Refills: 2    Associated Diagnoses: Other diabetic neurological complication associated with type 2 diabetes mellitus ([address] Recurrent falls      Walker (Ultra-Light Rollator) misc Use while ambulating  Qty: 1 Each, Refills: 0    Associated Diagnoses: DM type 2, goal HbA1c < 7% ([address] Recurrent falls      amLODIPine (NORVASC) 10 mg tablet Take 1 Tablet by mouth daily. Qty: 90 Tablet, Refills: 1    Associated Diagnoses: Primary hypertension      cloNIDine (CATAPRES) 0.1 mg/24 hr ptwk 1 Patch by TransDERmal route every seven (7) days. Qty: 12 Patch, Refills: 1    Associated Diagnoses: Primary hypertension      Dexcom G6  misc Use with the dexcom device to check blood sugars 4 times a day  Qty: 1 Each, Refills: 0    Comments: [phone] dx code E10.65      Dexcom G6 Sensor brandi Use as directed every 10 days  Qty: 10 Each, Refills: 11    Comments: [phone] dx code E10.65      Dexcom G6 Transmitter brandi Use as directed  Qty: 10 Each, Refills: 3    Comments: [phone] dx code E10.65      Insulin Needles, Disposable, 31 gauge x 5/16\" ndle Dx code E11.65, use 6x daily  Qty: 200 Each, Refills: 11      carvediloL (COREG) 12.5 mg tablet Take 1 Tablet by mouth two (2) times daily (with meals). Qty: 60 Tablet, Refills: 1      tamsulosin (FLOMAX) 0.4 mg capsule Take 0.4 mg by mouth daily. Patient Follow Up Instructions: Activity: Activity as tolerated  Diet: Diabetic Diet  Wound Care: None needed    Follow-up with PCP/Endocrinology in 2 weeks.   Follow-up tests/labs As per above physicians  Follow-up Information       Follow up With Specialties Details Why Juliana Benedict MD Internal Medicine Physician Schedule an appointment as soon as possible for a visit in 2 day(s) To schedule your PCP hospital follow up. [address] EMERGENCY DEPT Emergency Medicine  As needed [address] [facility] 31    Lisa Ochoa MD Endocrinology Physician, Internal Medicine Physician Follow up in 1 week(s)  [address] [facility]  [phone]            ________________________________________________________________    Risk of deterioration: Low    Condition at Discharge:  Stable  __________________________________________________________________    Disposition  Home with family, no needs    ____________________________________________________________________    Code Status: Full Code  ___________________________________________________________________      Total time in minutes spent coordinating this discharge (includes going over instructions, follow-up, prescriptions, and preparing report for sign off to her PCP) :  45 minutes    Signed:   Jewell Jones MD

## 2022-10-28 NOTE — PROGRESS NOTES
10:40 AM Spoke with [facility] regarding need for HD treatment prior to discharge. Patient was dialyzed yesterday and does not have orders for HD today per dialysis RN.

## 2022-10-28 NOTE — PROGRESS NOTES
Physical Therapy  10/28/2022    Orders received and acknowledged. Pt independent at baseline and remains at this level following admission for hypoglycemia. No difficulties mobilizing. Will sign off.      Thank Annette Saleem, PT, DPT

## 2022-10-28 NOTE — PROGRESS NOTES
Hospitalist    BS not checked for hours  BS now 531    Reduce D10W from 75 to 25 cc/hr  Lantus 10 units qHS  Humalog 6 units now  SSI in AM    If BS still > 350 at next check, wean D10 off    Jermaine Villeda MD

## 2022-10-28 NOTE — PROGRESS NOTES
Problem: Falls - Risk of  Goal: *Absence of Falls  Description: Document Cristin Marking Fall Risk and appropriate interventions in the flowsheet.   Outcome: Progressing Towards Goal  Note: Fall Risk Interventions:            Medication Interventions: Bed/chair exit alarm         History of Falls Interventions: Bed/chair exit alarm, Door open when patient unattended         Problem: Patient Education: Go to Patient Education Activity  Goal: Patient/Family Education  Outcome: Progressing Towards Goal     Problem: Chronic Renal Failure  Goal: *Fluid and electrolytes stabilized  Outcome: Progressing Towards Goal     Problem: Patient Education: Go to Patient Education Activity  Goal: Patient/Family Education  Outcome: Progressing Towards Goal     Problem: Infection - Risk of, Central Venous Catheter-Associated Bloodstream Infection  Goal: *Absence of infection signs and symptoms  Outcome: Progressing Towards Goal     Problem: Patient Education: Go to Patient Education Activity  Goal: Patient/Family Education  Outcome: Progressing Towards Goal

## 2022-10-28 NOTE — PROGRESS NOTES
Attempted to schedule hospital follow up PCP appointment. Unable to reach anyone, unable to leave voicemail. Pending patient discharge.  Ghazal Vazquez, Care Management Assistant

## 2022-10-28 NOTE — PROGRESS NOTES
DISCHARGE SUMMARY FROM [facility] NURSE    The patient is stable for discharge. I have reviewed the discharge instructions with the patient and parent. The patient and parent verbalized understanding. All questions were fully answered. The patient and parent verbalized no complaints. Hard scripts and medication handouts were given and reviewed with the patient and parent. Appropriate educational materials and medication side effects teaching were also provided. Cardiac monitor and IV line(s) were removed. The following personal items collected during admission were returned to the patient/family  Home medications: None  Dental Appliance: Dental Appliances: None  Vision: Visual Aid: None  Hearing Aid: Hearing Aid: None  Jewelry: Jewelry: Body Piercing  Clothing: Clothing: Pants, Socks  Other Valuables: Other Valuables: Other (comment), With patient (insulin pump)  Valuables sent to safe: There were no personal belongings, valuables or home medications left at patient's bedside,  or safe.

## 2022-10-28 NOTE — PROGRESS NOTES
0730 Bedside shift change report given to [address] (oncoming nurse) by Ary Sorto RN (offgoing nurse). Report included the following information SBAR, Kardex, ED Summary, MAR, Recent Results, and Cardiac Rhythm NSR .

## 2022-10-28 NOTE — PROGRESS NOTES
Transition of Care Plan:    Imelda Shall to go home  OP HD ;Venkat Liish at [facility]  appointments: PCP, Specialist  DME needed:none  Transportation at [address] [facility] member  [address] or means to access home:  yes      IM Medicare Letter:non applicable  Is patient a  and connected with the [address] [facility]? No             If yes, was [place] transfer form completed and [facility] notified? Caregiver Contact:   Discharge Caregiver contacted prior to discharge? no  Care Conference needed?:  No         Reason for Admission:  Hypoglycemia                     RUR Score:      Observation               Plan for utilizing home health:   no  PCP: First and Last name:  Libia Chau MD     Name of Practice:    Are you a current patient: Yes/No: yes   Approximate date of last visit: unknown, 2022   Can you participate in a virtual visit with your PCP:                     Current Advanced Directive/Advance Care Plan: Full Code      Healthcare Decision Maker:   Click here to complete [address] including selection of the Healthcare Decision Maker Relationship (ie \"Primary\")             Primary Decision MakerMvalencia Mcgee - Mother - [phone]                  Transition of Care Plan:    home       CM introduced self and role to patient. Verified his demographics and insurance, emergency contact. Patient lives with his mother. Patient is independent with adls , ambulates. No DME, no HH, no SNF in the [facility] in [place]. Encouraged patient to communicate with primary physician since he has missed several appointments due to conflicting scheduling with dialysis. Care Management Interventions  PCP Verified by CM: Yes  Mode of Transport at Discharge:  Other (see comment)  Transition of Care Consult (CM Consult): Discharge Planning  Discharge Durable Medical Equipment: No  Physical Therapy Consult: No  Occupational Therapy Consult: No  Speech Therapy Consult: No  Support Systems: Parent(s)  Discharge Location  Patient Expects to be Discharged to[de-identified] [address]  [phone]

## 2022-10-28 NOTE — PROGRESS NOTES
1900: Bedside and Verbal shift change report given to Siri Parnell  (oncoming nurse) by Monie Rodriges RN (offgoing nurse). Report included the following information SBAR, Kardex, Intake/Output, MAR, Accordion, and Cardiac Rhythm NSR .     2047: Orders received for Q2hr BG checks tonight. 2157: . No orders currently. This RN lauren served Dr. Gregorio Gracia for orders    [phone]: Orders received for insulin. 2358: . This ASAD aguilar served Dr. Gregorio Gracia for orders. Per Dr. Blas Newell note if BG >350 wean pt off D10.    0002: Orders received to hold D10. If BG drops below <200 restart per MD orders. 0205: . This ASAD aguilar served Dr. Gregorio Gracia for orders. Per Dr. Gregorio Gracia notify him if bg <200 or >500    [phone]: No orders received at this time. Will continue to monitor and continue to hold D10 at this time per MD    0402:     0608:   End of Shift Note    Bedside shift change report given to Marj Solitario rn (oncoming nurse) by Braeden Bailey RN (offgoing nurse). Report included the following information SBAR, Kardex, and Intake/Output    Shift worked:  night     Shift summary and any significant changes:     See above notes  *q2h sugars overnight  *D10 on hold per MD orders d/t high BG     Concerns for physician to address:  Blood Glucose control      [facility]   [facility]       Activity:  Activity Level:  Up with Assistance  Number times ambulated in hallways past shift: 0  Number of times OOB to chair past shift: 0    Cardiac:   Cardiac Monitoring: Yes      Cardiac Rhythm: Sinus Rhythm    Access:  Current line(s): PIV     Genitourinary:   Urinary status: voiding    Respiratory:   O2 Device: None (Room air)  Chronic home O2 use?: NO  Incentive spirometer at bedside: NO       GI:     Current diet:  ADULT DIET Regular; 4 carb choices (60 gm/meal)  Passing flatus: YES  Tolerating current diet: YES       Pain Management:   Patient states pain is manageable on current regimen: YES    Skin:  Corey Score: 22  Interventions: increase time out of bed    Patient Safety:  Fall Score:  Total Score: 2  Interventions: bed/chair alarm, gripper socks, and pt to call before getting OOB       Length of Stay:  Expected LOS: - - -  Actual LOS: 0      Cristiana Martini RN

## 2022-10-31 ENCOUNTER — DOCUMENTATION ONLY (OUTPATIENT)
Dept: ENDOCRINOLOGY | Age: 42
End: 2022-10-31

## 2022-10-31 ENCOUNTER — PATIENT OUTREACH (OUTPATIENT)
Dept: CASE MANAGEMENT | Age: 42
End: 2022-10-31

## 2022-10-31 NOTE — PROGRESS NOTES
Called [facility] 2x- he missed appt on Friday- was in the hospital  Left vm on cell asking him to call back so we can reschedule  Pt's mother's # disconnected    MD [facility]

## 2022-11-02 ENCOUNTER — OFFICE VISIT (OUTPATIENT)
Dept: PRIMARY CARE CLINIC | Age: 42
End: 2022-11-02
Payer: MEDICAID

## 2022-11-02 VITALS
SYSTOLIC BLOOD PRESSURE: 176 MMHG | WEIGHT: 158.4 LBS | TEMPERATURE: 97.5 F | RESPIRATION RATE: 18 BRPM | OXYGEN SATURATION: 98 % | BODY MASS INDEX: 24.01 KG/M2 | HEART RATE: 99 BPM | DIASTOLIC BLOOD PRESSURE: 102 MMHG | HEIGHT: 68 IN

## 2022-11-02 DIAGNOSIS — E11.49 OTHER DIABETIC NEUROLOGICAL COMPLICATION ASSOCIATED WITH TYPE 2 DIABETES MELLITUS (HCC): ICD-10-CM

## 2022-11-02 DIAGNOSIS — N18.6 ESRD (END STAGE RENAL DISEASE) (HCC): ICD-10-CM

## 2022-11-02 DIAGNOSIS — R29.6 RECURRENT FALLS: ICD-10-CM

## 2022-11-02 DIAGNOSIS — E78.00 HYPERCHOLESTEREMIA: ICD-10-CM

## 2022-11-02 DIAGNOSIS — I10 PRIMARY HYPERTENSION: Primary | ICD-10-CM

## 2022-11-02 PROCEDURE — 99496 TRANSJ CARE MGMT HIGH F2F 7D: CPT | Performed by: INTERNAL MEDICINE

## 2022-11-02 RX ORDER — HYDRALAZINE HYDROCHLORIDE 100 MG/1
100 TABLET, FILM COATED ORAL 3 TIMES DAILY
Qty: 90 TABLET | Refills: 3 | Status: ON HOLD | OUTPATIENT
Start: 2022-11-02

## 2022-11-02 RX ORDER — PREGABALIN 75 MG/1
75 CAPSULE ORAL
Qty: 30 CAPSULE | Refills: 2 | Status: ON HOLD | OUTPATIENT
Start: 2022-11-02

## 2022-11-02 NOTE — PROGRESS NOTES
Chief Complaint   Patient presents with    [facility] follow up [address] [place] [address] 10/27/2022        Visit Vitals  BP (!) 176/102 (BP 1 Location: Right upper arm, BP Patient Position: Sitting)   Pulse 99   Temp 97.5 °F (36.4 °C) (Esophageal)   Resp 18   Ht 5' 8\" (1.727 m)   Wt 158 lb 6.4 oz (71.8 kg)   SpO2 98%   BMI 24.08 kg/m²        Health Maintenance Due   Topic    Hepatitis B Vaccine (1 of 3 - Risk 3-dose series)    Foot Exam Q1     COVID-19 Vaccine (3 - Booster for Moderna series)    MICROALBUMIN Q1     Flu Vaccine (1)        1. \"Have you been to the ER, urgent care clinic since your last visit? Hospitalized since your last visit? \" Hospital follow up [address] [place] [address] 10/27/2022    2. \"Have you seen or consulted any other health care providers outside of the [address] [place] [address] since your last visit? \" No     3. For patients aged 39-70: Has the patient had a colonoscopy / FIT/ Cologuard? NA - based on age      If the patient is female:    4. For patients aged 41-77: Has the patient had a mammogram within the past 2 years? NA - based on age or sex      11. For patients aged 21-65: Has the patient had a pap smear?  NA - based on age or sex

## 2022-11-03 ENCOUNTER — PATIENT OUTREACH (OUTPATIENT)
Dept: CASE MANAGEMENT | Age: 42
End: 2022-11-03

## 2022-11-03 NOTE — PROGRESS NOTES
ACM contacted patient to provide resources as requested by PCP.  Unable to reach patient and left VM for return call with contact info provided

## 2022-11-03 NOTE — PROGRESS NOTES
Claryce Goltz is a 46 y.o.  male and presents with     Chief Complaint   Patient presents with    [address] [facility] follow up [address] [facility] 10/27/2022     Transition of care visit  Reason for admission-hyperglycemia  Discharge diagnoses-DKA       Admit date: 10/16/2022  Discharge date and time: 10/25/2022    Patient has had multiple admissions to the hospital since previous visit. He had diabetic ketoacidosis on 2 occasions due to malfunctioning of the insulin pump. During 1 admission he had worsening renal failure and temporary catheter was placed in the right chest wall for dialysis    Most recently had low sugars and passed out. Patient is being seen by nephrology and endocrinology. CT chest showed fluid overload. He had thoracocentesis done. Cultures were negative  Urine cultures were negative. He is on hemodialysis 3 times a week. He has appointment with endocrinology soon  Blood pressure is still elevated. He also complains of pain in his legs. He had seen neurologist who did MRI of his spine that showed minimal disc bulging  He could not follow-up with various specialists because he was admitted to the hospital 4 times within the past 2 months  He is taking Lyrica for pain that seems to help some. He is also taking duloxetine for pain. He has not been able to work due to his underlying condition.   He has not applied for disability    Past Medical History:   Diagnosis Date    Bipolar 1 disorder, depressed ([address]     Bipolar disorder ([address]     Chronic kidney disease, stage 3a ([address]     Depression     Diabetes ([address]     DKA, type 1 ([address] 1/27/2013    diagnosed age 23    DKA, type 1 ([address]     H/O noncompliance with medical treatment, presenting hazards to health     MRSA (methicillin resistant staph aureus) culture positive     MRSA (methicillin resistant Staphylococcus aureus)     Face    Noncompliance with medication regimen     Second hand smoke exposure     Seizure ([address]     Seizures ([facility] 2006 or 2007    one episode during prison     Past Surgical History:   Procedure Laterality Date    HX HEENT      top left wisdom tooth    HX ORTHOPAEDIC Left     wrist; MCV    IR INSERT NON TUNL CVC OVER 5 YRS  9/7/2022    IR INSERT TUNL CVC W/O PORT OVER 5 YR  9/9/2022    UPPER GI ENDOSCOPY,BIOPSY  11/20/2018          Current Outpatient Medications   Medication Sig    hydrALAZINE (APRESOLINE) 100 mg tablet Take 1 Tablet by mouth three (3) times daily. pregabalin (Lyrica) 75 mg capsule Take 1 Capsule by mouth nightly. Max Daily Amount: 75 mg.    insulin glargine (LANTUS) 100 unit/mL injection Take 10 units once daily at night    insulin lispro (HUMALOG) 100 unit/mL injection INITIATE CORRECTIVE INSULIN PROTOCOL (FREDA): High Sensitivity (thin, ESRD): AC (before meals), Q6H CORRECTIONAL SCALE only For Blood Sugar (mg/dl) of :           140-199=0 units          200-249=2 units 250-299=3 units 300-349=4 units 350-400=6 units 401 or greater = Call MD Give in addition to basal medications. Do Not Hold for NPO BEDTIME CORRECTIONAL sliding scale when scheduled: 200-249=1 units 250-349=2 units 350-400=3 units 401 or greater = Call MD Give in addition to basal medications. Do Not Hold for NPO Fast Acting - Administer Immediately - or within 15 minutes of start of meal, if mealtime coverage. aspirin 81 mg chewable tablet Take 1 Tablet by mouth daily. lancets [facility]    Blood-Glucose Meter monitoring kit [facility]    glucose blood VI test strips (blood glucose test) strip [facility]    Insulin Syringe-Needle U-100 1 mL 28 x 5/16\" syrg 1 Syringe by SubCUTAneous route Before breakfast, lunch, dinner and at bedtime. DULoxetine (CYMBALTA) 60 mg capsule Take 1 capsule by mouth once daily    naloxone (Narcan) 4 mg/actuation nasal spray Use 1 spray intranasally, then discard. Repeat with new spray every 2 min as needed for opioid overdose symptoms, alternating nostrils.     rosuvastatin (CRESTOR) 10 mg tablet Take 1 Tablet by mouth nightly for 60 days. Ketone Blood Test strp 50 Each by Does Not Apply route as needed for PRN Reason (Other) (as needed for suspected dka). pantoprazole (PROTONIX) 40 mg tablet Take 1 Tablet by mouth Daily (before breakfast). finasteride (PROSCAR) 5 mg tablet Take 1 Tablet by mouth in the morning. Walker (Ultra-Light Rollator) misc Use while ambulating    amLODIPine (NORVASC) 10 mg tablet Take 1 Tablet by mouth daily. cloNIDine (CATAPRES) 0.1 mg/24 hr ptwk 1 Patch by TransDERmal route every seven (7) days. Dexcom G6  misc Use with the dexcom device to check blood sugars 4 times a day    Dexcom G6 Sensor brandi Use as directed every 10 days    Dexcom G6 Transmitter brandi Use as directed    Insulin Needles, Disposable, 31 gauge x 5/16\" ndle Dx code E11.65, use 6x daily    carvediloL (COREG) 12.5 mg tablet Take 1 Tablet by mouth two (2) times daily (with meals). tamsulosin (FLOMAX) 0.4 mg capsule Take 0.4 mg by mouth daily. bacitracin zinc (BACITRACIN) ointment Apply  to affected area two (2) times a day. (Patient not taking: Reported on 11/2/2022)     No current facility-administered medications for this visit.      Health Maintenance   Topic Date Due    Hepatitis B Vaccine (1 of 3 - Risk 3-dose series) Never done    Foot Exam Q1  03/07/2021    COVID-19 Vaccine (3 - Booster for Moderna series) 12/08/2021    MICROALBUMIN Q1  04/01/2022    Flu Vaccine (1) Never done    Depression Screen  02/24/2023    Lipid Screen  04/01/2023    A1C test (Diabetic or Prediabetic)  09/26/2023    Eye Exam Retinal or Dilated  08/01/2024    DTaP/Tdap/Td series (2 - Td or Tdap) 03/09/2028    Hepatitis C Screening  Completed    Pneumococcal 0-64 years  Completed     Immunization History   Administered Date(s) Administered    (RETIRED) Pneumococcal Vaccine (Unspecified Type) 08/18/2011    COVID-19, MODERNA BLUE border, Primary or Immunocompromised, (age 18y+), IM, 100 mcg/0.5mL 09/11/2021, 10/13/2021    MMR 03/23/1995    Pneumococcal Polysaccharide (PPSV-23) 08/18/2011    Rabies Vaccine IM 09/17/1992    TD Vaccine 03/08/2011    Tdap 03/09/2018     No LMP for male patient. Allergies and Intolerances:   No Known Allergies    Family History:   Family History   Problem Relation Age of Onset    Diabetes Mother     Diabetes Other         neice, type 1        Social History:   He  reports that he quit smoking about 2 years ago. His smoking use included cigarettes. He started smoking about 23 years ago. He has a 1.60 pack-year smoking history. He has never used smokeless tobacco.  He  reports no history of alcohol use.             Review of Systems:   General: negative for - chills, fatigue, fever, weight change  Psych: negative for - anxiety, depression, irritability or mood swings  ENT: negative for - headaches, hearing change, nasal congestion, oral lesions, sneezing or sore throat  Heme/ Lymph: negative for - bleeding problems, bruising, pallor or swollen lymph nodes  Endo: negative for - hot flashes, polydipsia/polyuria or temperature intolerance  Resp: negative for - cough, shortness of breath or wheezing  CV: negative for - chest pain, edema or palpitations  GI: negative for - abdominal pain, change in bowel habits, constipation, diarrhea or nausea/vomiting  : negative for - dysuria, hematuria, incontinence, pelvic pain or vulvar/vaginal symptoms  MSK: negative for - joint pain, joint swelling or muscle pain  Neuro: negative for - confusion, headaches, seizures or weakness  Derm: negative for - dry skin, hair changes, rash or skin lesion changes          Physical:   Vitals:   Vitals:    11/02/22 1142   BP: (!) 176/102   Pulse: 99   Resp: 18   Temp: 97.5 °F (36.4 °C)   TempSrc: Esophageal   SpO2: 98%   Weight: 158 lb 6.4 oz (71.8 kg)   Height: 5' 8\" (1.727 m)           Exam:   HEENT- atraumatic,normocephalic, awake, oriented, well nourished  Neck - supple,no enlarged lymph nodes, no JVD, no thyromegaly  Chest- CTA, no rhonchi, no crackles, dialysis catheter in the anterior chest wall on the right side  Heart- rrr, no murmurs / gallop/rub  Abdomen- soft,BS+,NT, no hepatosplenomegaly  Ext - no c/c/edema   Neuro- no focal deficits. Power 5/5 all extremities  Skin - warm,dry, no obvious rashes. Review of Data:   LABS:   Lab Results   Component Value Date/Time    WBC 6.0 10/28/2022 04:03 AM    HGB 11.7 (L) 10/28/2022 04:03 AM    HCT 36.7 10/28/2022 04:03 AM    PLATELET 362 95/81/7466 04:03 AM     Lab Results   Component Value Date/Time    Sodium 126 (L) 10/28/2022 04:03 AM    Potassium 4.4 10/28/2022 04:03 AM    Chloride 92 (L) 10/28/2022 04:03 AM    CO2 26 10/28/2022 04:03 AM    Glucose 374 (H) 10/28/2022 04:03 AM    BUN 20 10/28/2022 04:03 AM    Creatinine 3.11 (H) 10/28/2022 04:03 AM     Lab Results   Component Value Date/Time    Cholesterol, total 192 11/15/2021 12:01 PM    HDL Cholesterol 61 11/15/2021 12:01 PM    LDL, calculated 89.4 11/15/2021 12:01 PM    Triglyceride 208 (H) 11/15/2021 12:01 PM     No components found for: GPT        Impression / Plan:        ICD-10-CM ICD-9-CM    1. Primary hypertension  I10 401.9 hydrALAZINE (APRESOLINE) 100 mg tablet      2. Hypercholesteremia  E78.00 272.0       3. ESRD (end stage renal disease) ([facility]  N18.6 585.6       4. Other diabetic neurological complication associated with type 2 diabetes mellitus (HCC)  E11.49 250.60 pregabalin (Lyrica) 75 mg capsule      5. Recurrent falls  R29.6 V15.88 pregabalin (Lyrica) 75 mg capsule      Hypertension--increase hydralazine to 100 mg 3 times a day    Diabetes-type I-brittle, being seen by endocrinology and currently on insulin pump,    End-stage renal disease-on hemodialysis 3 times a week    Diabetic neuropathy-on Lyrica and duloxetine. Patient has not had a chance to see pain management    Patient's prognosis is guarded.     Based on history and his underlying medical conditions, patient should be deemed permanently disabled    Explained to patient risk benefits of the medications. Advised patient to stop meds if having any side effects. Pt verbalized understanding of the instructions. I have discussed the diagnosis with the patient and the intended plan as seen in the above orders. The patient has received an after-visit summary and questions were answered concerning future plans. I have discussed medication side effects and warnings with the patient as well. I have reviewed the plan of care with the patient, accepted their input and they are in agreement with the treatment goals. Reviewed plan of care. Patient has provided input and agrees with goals. Follow-up and Dispositions    Return in about 3 months (around 2/2/2023).          Gerson Holden MD

## 2022-11-03 NOTE — PROGRESS NOTES
ACM contacted patient per PCP request to provide resources on where to go to apply for disability . Guidance provided on how to apply for disability. All questions answered.  No further outreaches planned at this time

## 2022-11-22 ENCOUNTER — TELEPHONE (OUTPATIENT)
Dept: ENDOCRINOLOGY | Age: 42
End: 2022-11-22

## 2022-11-22 NOTE — TELEPHONE ENCOUNTER
11/22/2022  1:02 PM    Reshma from [facility] called to get the latest office notes faxed to her at [phone]- she is looking for the patient's pump settings.  Please give her a call back at [phone]

## 2022-11-29 ENCOUNTER — HOSPITAL ENCOUNTER (INPATIENT)
Age: 42
LOS: 7 days | Discharge: HOME HEALTH | DRG: 420 | End: 2022-12-06
Attending: STUDENT IN AN ORGANIZED HEALTH CARE EDUCATION/TRAINING PROGRAM | Admitting: STUDENT IN AN ORGANIZED HEALTH CARE EDUCATION/TRAINING PROGRAM
Payer: MEDICAID

## 2022-11-29 ENCOUNTER — APPOINTMENT (OUTPATIENT)
Dept: GENERAL RADIOLOGY | Age: 42
DRG: 420 | End: 2022-11-29
Attending: STUDENT IN AN ORGANIZED HEALTH CARE EDUCATION/TRAINING PROGRAM
Payer: MEDICAID

## 2022-11-29 DIAGNOSIS — I10 PRIMARY HYPERTENSION: ICD-10-CM

## 2022-11-29 DIAGNOSIS — E10.10 DIABETIC KETOACIDOSIS WITHOUT COMA ASSOCIATED WITH TYPE 1 DIABETES MELLITUS (HCC): Primary | ICD-10-CM

## 2022-11-29 DIAGNOSIS — E10.10 DKA, TYPE 1, NOT AT GOAL (HCC): ICD-10-CM

## 2022-11-29 LAB
ADMINISTERED INITIALS, ADMINIT: NORMAL
ALBUMIN SERPL-MCNC: 1.5 G/DL (ref 3.5–5)
ALBUMIN/GLOB SERPL: 0.4 {RATIO} (ref 1.1–2.2)
ALP SERPL-CCNC: 195 U/L (ref 45–117)
ALT SERPL-CCNC: 26 U/L (ref 12–78)
ANION GAP SERPL CALC-SCNC: 20 MMOL/L (ref 5–15)
ANION GAP SERPL CALC-SCNC: 23 MMOL/L (ref 5–15)
APPEARANCE UR: CLEAR
ARTERIAL PATENCY WRIST A: POSITIVE
AST SERPL-CCNC: 17 U/L (ref 15–37)
BACTERIA URNS QL MICRO: NEGATIVE /HPF
BASE DEFICIT BLD-SCNC: 12.3 MMOL/L
BASE DEFICIT BLD-SCNC: 16.3 MMOL/L
BASOPHILS # BLD: 0 K/UL (ref 0–0.1)
BASOPHILS NFR BLD: 0 % (ref 0–1)
BDY SITE: ABNORMAL
BILIRUB SERPL-MCNC: 0.3 MG/DL (ref 0.2–1)
BILIRUB UR QL: NEGATIVE
BNP SERPL-MCNC: ABNORMAL PG/ML
BUN SERPL-MCNC: 64 MG/DL (ref 6–20)
BUN SERPL-MCNC: 65 MG/DL (ref 6–20)
BUN/CREAT SERPL: 11 (ref 12–20)
BUN/CREAT SERPL: 11 (ref 12–20)
CA-I BLD-MCNC: 1.09 MMOL/L (ref 1.12–1.32)
CALCIUM SERPL-MCNC: 7.7 MG/DL (ref 8.5–10.1)
CALCIUM SERPL-MCNC: 7.8 MG/DL (ref 8.5–10.1)
CHLORIDE BLD-SCNC: 86 MMOL/L (ref 100–108)
CHLORIDE SERPL-SCNC: 80 MMOL/L (ref 97–108)
CHLORIDE SERPL-SCNC: 83 MMOL/L (ref 97–108)
CO2 BLD-SCNC: 10 MMOL/L (ref 19–24)
CO2 SERPL-SCNC: 12 MMOL/L (ref 21–32)
CO2 SERPL-SCNC: 14 MMOL/L (ref 21–32)
COLOR UR: ABNORMAL
CREAT SERPL-MCNC: 5.95 MG/DL (ref 0.7–1.3)
CREAT SERPL-MCNC: 6.11 MG/DL (ref 0.7–1.3)
CREAT UR-MCNC: 5.4 MG/DL (ref 0.6–1.3)
D50 ADMINISTERED, D50ADM: 0 ML
D50 ORDER, D50ORD: 0 ML
DIFFERENTIAL METHOD BLD: ABNORMAL
EOSINOPHIL # BLD: 0 K/UL (ref 0–0.4)
EOSINOPHIL NFR BLD: 0 % (ref 0–7)
EPITH CASTS URNS QL MICRO: ABNORMAL /LPF
ERYTHROCYTE [DISTWIDTH] IN BLOOD BY AUTOMATED COUNT: 18.7 % (ref 11.5–14.5)
GAS FLOW.O2 O2 DELIVERY SYS: ABNORMAL L/MIN
GLOBULIN SER CALC-MCNC: 3.4 G/DL (ref 2–4)
GLUCOSE BLD STRIP.AUTO-MCNC: >600 MG/DL (ref 65–117)
GLUCOSE BLD STRIP.AUTO-MCNC: >600 MG/DL (ref 65–117)
GLUCOSE BLD STRIP.AUTO-MCNC: >700 MG/DL (ref 74–106)
GLUCOSE SERPL-MCNC: 1013 MG/DL (ref 65–100)
GLUCOSE SERPL-MCNC: 1181 MG/DL (ref 65–100)
GLUCOSE UR STRIP.AUTO-MCNC: >1000 MG/DL
GLUCOSE, GLC: 601 MG/DL
HCO3 BLD-SCNC: 13 MMOL/L (ref 22–26)
HCO3 BLDA-SCNC: 11 MMOL/L
HCT VFR BLD AUTO: 28.3 % (ref 36.6–50.3)
HGB BLD-MCNC: 8.9 G/DL (ref 12.1–17)
HGB UR QL STRIP: ABNORMAL
HIGH TARGET, HITG: 200 MG/DL
HYALINE CASTS URNS QL MICRO: ABNORMAL /LPF (ref 0–2)
IMM GRANULOCYTES # BLD AUTO: 0.3 K/UL (ref 0–0.04)
IMM GRANULOCYTES NFR BLD AUTO: 2 % (ref 0–0.5)
INSULIN ADMINSTERED, INSADM: 10.8 UNITS/HOUR
INSULIN ADMINSTERED, INSADM: 16.2 UNITS/HOUR
INSULIN ADMINSTERED, INSADM: 21.6 UNITS/HOUR
INSULIN ORDER, INSORD: 10.8 UNITS/HOUR
INSULIN ORDER, INSORD: 16.2 UNITS/HOUR
INSULIN ORDER, INSORD: 21.6 UNITS/HOUR
KETONES UR QL STRIP.AUTO: 15 MG/DL
LACTATE BLD-SCNC: 3.85 MMOL/L (ref 0.4–2)
LEUKOCYTE ESTERASE UR QL STRIP.AUTO: NEGATIVE
LIPASE SERPL-CCNC: 83 U/L (ref 73–393)
LOW TARGET, LOT: 150 MG/DL
LYMPHOCYTES # BLD: 1.3 K/UL (ref 0.8–3.5)
LYMPHOCYTES NFR BLD: 9 % (ref 12–49)
MAGNESIUM SERPL-MCNC: 2 MG/DL (ref 1.6–2.4)
MCH RBC QN AUTO: 26.3 PG (ref 26–34)
MCHC RBC AUTO-ENTMCNC: 31.4 G/DL (ref 30–36.5)
MCV RBC AUTO: 83.5 FL (ref 80–99)
MINUTES UNTIL NEXT BG, NBG: 60 MIN
MONOCYTES # BLD: 0.7 K/UL (ref 0–1)
MONOCYTES NFR BLD: 5 % (ref 5–13)
MULTIPLIER, MUL: 0.02
MULTIPLIER, MUL: 0.03
MULTIPLIER, MUL: 0.04
NEUTS SEG # BLD: 12.9 K/UL (ref 1.8–8)
NEUTS SEG NFR BLD: 84 % (ref 32–75)
NITRITE UR QL STRIP.AUTO: NEGATIVE
NRBC # BLD: 0 K/UL (ref 0–0.01)
NRBC BLD-RTO: 0 PER 100 WBC
ORDER INITIALS, ORDINIT: NORMAL
PCO2 BLD: 26.9 MMHG (ref 35–45)
PCO2 BLDV: 29.2 MMHG (ref 41–51)
PH BLD: 7.29 [PH] (ref 7.35–7.45)
PH BLDV: 7.17 [PH] (ref 7.32–7.42)
PH UR STRIP: 6.5 [PH] (ref 5–8)
PHOSPHATE SERPL-MCNC: 6 MG/DL (ref 2.6–4.7)
PLATELET # BLD AUTO: 392 K/UL (ref 150–400)
PMV BLD AUTO: 10.4 FL (ref 8.9–12.9)
PO2 BLD: 95 MMHG (ref 80–100)
PO2 BLDV: 29 MMHG (ref 25–40)
POTASSIUM BLD-SCNC: 3.9 MMOL/L (ref 3.5–5.5)
POTASSIUM SERPL-SCNC: 4 MMOL/L (ref 3.5–5.1)
POTASSIUM SERPL-SCNC: 4.1 MMOL/L (ref 3.5–5.1)
PROT SERPL-MCNC: 4.9 G/DL (ref 6.4–8.2)
PROT UR STRIP-MCNC: 300 MG/DL
RBC # BLD AUTO: 3.39 M/UL (ref 4.1–5.7)
RBC #/AREA URNS HPF: ABNORMAL /HPF (ref 0–5)
SAO2 % BLD: 96.6 % (ref 92–97)
SERVICE CMNT-IMP: ABNORMAL
SODIUM BLD-SCNC: 111 MMOL/L (ref 136–145)
SODIUM SERPL-SCNC: 115 MMOL/L (ref 136–145)
SODIUM SERPL-SCNC: 117 MMOL/L (ref 136–145)
SP GR UR REFRACTOMETRY: 1.02
SPECIMEN SITE: ABNORMAL
SPECIMEN TYPE: ABNORMAL
TROPONIN-HIGH SENSITIVITY: 188 NG/L (ref 0–76)
UA: UC IF INDICATED,UAUC: ABNORMAL
UROBILINOGEN UR QL STRIP.AUTO: 0.2 EU/DL (ref 0.2–1)
WBC # BLD AUTO: 15.2 K/UL (ref 4.1–11.1)
WBC URNS QL MICRO: ABNORMAL /HPF (ref 0–4)

## 2022-11-29 PROCEDURE — 83880 ASSAY OF NATRIURETIC PEPTIDE: CPT

## 2022-11-29 PROCEDURE — 36600 WITHDRAWAL OF ARTERIAL BLOOD: CPT

## 2022-11-29 PROCEDURE — 65270000046 HC RM TELEMETRY

## 2022-11-29 PROCEDURE — 96375 TX/PRO/DX INJ NEW DRUG ADDON: CPT

## 2022-11-29 PROCEDURE — 81001 URINALYSIS AUTO W/SCOPE: CPT

## 2022-11-29 PROCEDURE — 83690 ASSAY OF LIPASE: CPT

## 2022-11-29 PROCEDURE — 93005 ELECTROCARDIOGRAM TRACING: CPT

## 2022-11-29 PROCEDURE — 74011636637 HC RX REV CODE- 636/637: Performed by: STUDENT IN AN ORGANIZED HEALTH CARE EDUCATION/TRAINING PROGRAM

## 2022-11-29 PROCEDURE — 82962 GLUCOSE BLOOD TEST: CPT

## 2022-11-29 PROCEDURE — 74011250636 HC RX REV CODE- 250/636: Performed by: NURSE PRACTITIONER

## 2022-11-29 PROCEDURE — 82803 BLOOD GASES ANY COMBINATION: CPT

## 2022-11-29 PROCEDURE — 74011000258 HC RX REV CODE- 258: Performed by: STUDENT IN AN ORGANIZED HEALTH CARE EDUCATION/TRAINING PROGRAM

## 2022-11-29 PROCEDURE — 99285 EMERGENCY DEPT VISIT HI MDM: CPT

## 2022-11-29 PROCEDURE — 74011250636 HC RX REV CODE- 250/636: Performed by: STUDENT IN AN ORGANIZED HEALTH CARE EDUCATION/TRAINING PROGRAM

## 2022-11-29 PROCEDURE — 74011250637 HC RX REV CODE- 250/637: Performed by: STUDENT IN AN ORGANIZED HEALTH CARE EDUCATION/TRAINING PROGRAM

## 2022-11-29 PROCEDURE — 85025 COMPLETE CBC W/AUTO DIFF WBC: CPT

## 2022-11-29 PROCEDURE — 74011000250 HC RX REV CODE- 250: Performed by: STUDENT IN AN ORGANIZED HEALTH CARE EDUCATION/TRAINING PROGRAM

## 2022-11-29 PROCEDURE — 94762 N-INVAS EAR/PLS OXIMTRY CONT: CPT

## 2022-11-29 PROCEDURE — 71045 X-RAY EXAM CHEST 1 VIEW: CPT

## 2022-11-29 PROCEDURE — 36415 COLL VENOUS BLD VENIPUNCTURE: CPT

## 2022-11-29 PROCEDURE — 84100 ASSAY OF PHOSPHORUS: CPT

## 2022-11-29 PROCEDURE — 83036 HEMOGLOBIN GLYCOSYLATED A1C: CPT

## 2022-11-29 PROCEDURE — 96374 THER/PROPH/DIAG INJ IV PUSH: CPT

## 2022-11-29 PROCEDURE — 82947 ASSAY GLUCOSE BLOOD QUANT: CPT

## 2022-11-29 PROCEDURE — 84484 ASSAY OF TROPONIN QUANT: CPT

## 2022-11-29 PROCEDURE — C9113 INJ PANTOPRAZOLE SODIUM, VIA: HCPCS | Performed by: STUDENT IN AN ORGANIZED HEALTH CARE EDUCATION/TRAINING PROGRAM

## 2022-11-29 PROCEDURE — 96361 HYDRATE IV INFUSION ADD-ON: CPT

## 2022-11-29 PROCEDURE — 80053 COMPREHEN METABOLIC PANEL: CPT

## 2022-11-29 PROCEDURE — 83735 ASSAY OF MAGNESIUM: CPT

## 2022-11-29 RX ORDER — MAGNESIUM SULFATE 100 %
4 CRYSTALS MISCELLANEOUS AS NEEDED
Status: DISCONTINUED | OUTPATIENT
Start: 2022-11-29 | End: 2022-12-01 | Stop reason: SDUPTHER

## 2022-11-29 RX ORDER — GUAIFENESIN 100 MG/5ML
81 LIQUID (ML) ORAL DAILY
Status: DISCONTINUED | OUTPATIENT
Start: 2022-11-30 | End: 2022-12-06 | Stop reason: HOSPADM

## 2022-11-29 RX ORDER — ONDANSETRON 2 MG/ML
4 INJECTION INTRAMUSCULAR; INTRAVENOUS ONCE
Status: COMPLETED | OUTPATIENT
Start: 2022-11-29 | End: 2022-11-29

## 2022-11-29 RX ORDER — PREGABALIN 25 MG/1
75 CAPSULE ORAL
Status: DISCONTINUED | OUTPATIENT
Start: 2022-11-29 | End: 2022-12-06 | Stop reason: HOSPADM

## 2022-11-29 RX ORDER — HYDRALAZINE HYDROCHLORIDE 50 MG/1
100 TABLET, FILM COATED ORAL 3 TIMES DAILY
Status: DISCONTINUED | OUTPATIENT
Start: 2022-11-29 | End: 2022-12-06 | Stop reason: HOSPADM

## 2022-11-29 RX ORDER — FINASTERIDE 5 MG/1
5 TABLET, FILM COATED ORAL DAILY
Status: DISCONTINUED | OUTPATIENT
Start: 2022-11-30 | End: 2022-12-06 | Stop reason: HOSPADM

## 2022-11-29 RX ORDER — HYDROMORPHONE HYDROCHLORIDE 1 MG/ML
0.5 INJECTION, SOLUTION INTRAMUSCULAR; INTRAVENOUS; SUBCUTANEOUS ONCE
Status: COMPLETED | OUTPATIENT
Start: 2022-11-29 | End: 2022-11-29

## 2022-11-29 RX ORDER — INSULIN LISPRO 100 [IU]/ML
INJECTION, SOLUTION INTRAVENOUS; SUBCUTANEOUS
Status: DISCONTINUED | OUTPATIENT
Start: 2022-11-30 | End: 2022-12-01 | Stop reason: SDUPTHER

## 2022-11-29 RX ORDER — ONDANSETRON 2 MG/ML
4 INJECTION INTRAMUSCULAR; INTRAVENOUS
Status: DISCONTINUED | OUTPATIENT
Start: 2022-11-29 | End: 2022-11-29 | Stop reason: SDUPTHER

## 2022-11-29 RX ORDER — CARVEDILOL 12.5 MG/1
12.5 TABLET ORAL 2 TIMES DAILY WITH MEALS
Status: DISCONTINUED | OUTPATIENT
Start: 2022-11-30 | End: 2022-12-06 | Stop reason: HOSPADM

## 2022-11-29 RX ORDER — CLONIDINE 0.1 MG/24H
1 PATCH, EXTENDED RELEASE TRANSDERMAL
Status: DISCONTINUED | OUTPATIENT
Start: 2022-11-29 | End: 2022-11-30

## 2022-11-29 RX ORDER — AMLODIPINE BESYLATE 5 MG/1
10 TABLET ORAL DAILY
Status: DISCONTINUED | OUTPATIENT
Start: 2022-11-30 | End: 2022-12-06 | Stop reason: HOSPADM

## 2022-11-29 RX ORDER — PANTOPRAZOLE SODIUM 40 MG/1
40 TABLET, DELAYED RELEASE ORAL
Status: DISCONTINUED | OUTPATIENT
Start: 2022-11-30 | End: 2022-11-30

## 2022-11-29 RX ORDER — DULOXETIN HYDROCHLORIDE 30 MG/1
60 CAPSULE, DELAYED RELEASE ORAL DAILY
Status: DISCONTINUED | OUTPATIENT
Start: 2022-11-30 | End: 2022-12-06 | Stop reason: HOSPADM

## 2022-11-29 RX ORDER — TAMSULOSIN HYDROCHLORIDE 0.4 MG/1
0.4 CAPSULE ORAL DAILY
Status: DISCONTINUED | OUTPATIENT
Start: 2022-11-30 | End: 2022-12-06 | Stop reason: HOSPADM

## 2022-11-29 RX ORDER — ONDANSETRON 2 MG/ML
4 INJECTION INTRAMUSCULAR; INTRAVENOUS
Status: DISCONTINUED | OUTPATIENT
Start: 2022-11-29 | End: 2022-12-06 | Stop reason: HOSPADM

## 2022-11-29 RX ORDER — POTASSIUM CHLORIDE 7.45 MG/ML
10 INJECTION INTRAVENOUS
Status: COMPLETED | OUTPATIENT
Start: 2022-11-29 | End: 2022-11-30

## 2022-11-29 RX ADMIN — SODIUM CHLORIDE 40 MG: 9 INJECTION, SOLUTION INTRAMUSCULAR; INTRAVENOUS; SUBCUTANEOUS at 19:16

## 2022-11-29 RX ADMIN — POTASSIUM CHLORIDE 10 MEQ: 7.46 INJECTION, SOLUTION INTRAVENOUS at 20:39

## 2022-11-29 RX ADMIN — SODIUM CHLORIDE: 900 INJECTION, SOLUTION INTRAVENOUS at 22:46

## 2022-11-29 RX ADMIN — SODIUM CHLORIDE 1000 ML: 9 INJECTION, SOLUTION INTRAVENOUS at 19:15

## 2022-11-29 RX ADMIN — HYDROMORPHONE HYDROCHLORIDE 0.5 MG: 1 INJECTION, SOLUTION INTRAMUSCULAR; INTRAVENOUS; SUBCUTANEOUS at 22:44

## 2022-11-29 RX ADMIN — SODIUM CHLORIDE 10.8 UNITS/HR: 9 INJECTION, SOLUTION INTRAVENOUS at 21:35

## 2022-11-29 RX ADMIN — ONDANSETRON 4 MG: 2 INJECTION INTRAMUSCULAR; INTRAVENOUS at 19:15

## 2022-11-29 RX ADMIN — PREGABALIN 75 MG: 25 CAPSULE ORAL at 21:30

## 2022-11-29 RX ADMIN — ONDANSETRON 4 MG: 2 INJECTION INTRAMUSCULAR; INTRAVENOUS at 22:35

## 2022-11-29 RX ADMIN — HYDRALAZINE HYDROCHLORIDE 100 MG: 50 TABLET, FILM COATED ORAL at 21:31

## 2022-11-30 LAB
ADMINISTERED INITIALS, ADMINIT: NORMAL
ALBUMIN SERPL-MCNC: 1.2 G/DL (ref 3.5–5)
ALBUMIN/GLOB SERPL: 0.4 {RATIO} (ref 1.1–2.2)
ALP SERPL-CCNC: 148 U/L (ref 45–117)
ALT SERPL-CCNC: 19 U/L (ref 12–78)
ANION GAP BLD CALC-SCNC: 11 MMOL/L (ref 10–20)
ANION GAP SERPL CALC-SCNC: 11 MMOL/L (ref 5–15)
ANION GAP SERPL CALC-SCNC: 9 MMOL/L (ref 5–15)
AST SERPL-CCNC: 13 U/L (ref 15–37)
ATRIAL RATE: 90 BPM
BASE DEFICIT BLDV-SCNC: 6.8 MMOL/L
BILIRUB SERPL-MCNC: 0.4 MG/DL (ref 0.2–1)
BUN SERPL-MCNC: 65 MG/DL (ref 6–20)
BUN SERPL-MCNC: 66 MG/DL (ref 6–20)
BUN/CREAT SERPL: 11 (ref 12–20)
BUN/CREAT SERPL: 12 (ref 12–20)
CA-I BLD-MCNC: 1.03 MMOL/L (ref 1.12–1.32)
CALCIUM SERPL-MCNC: 6.9 MG/DL (ref 8.5–10.1)
CALCIUM SERPL-MCNC: 7.4 MG/DL (ref 8.5–10.1)
CALCULATED P AXIS, ECG09: 80 DEGREES
CALCULATED R AXIS, ECG10: 91 DEGREES
CALCULATED T AXIS, ECG11: 61 DEGREES
CHLORIDE BLD-SCNC: 95 MMOL/L (ref 100–108)
CHLORIDE SERPL-SCNC: 92 MMOL/L (ref 97–108)
CHLORIDE SERPL-SCNC: 98 MMOL/L (ref 97–108)
CO2 BLD-SCNC: 22 MMOL/L (ref 19–24)
CO2 SERPL-SCNC: 21 MMOL/L (ref 21–32)
CO2 SERPL-SCNC: 24 MMOL/L (ref 21–32)
CREAT SERPL-MCNC: 5.57 MG/DL (ref 0.7–1.3)
CREAT SERPL-MCNC: 5.83 MG/DL (ref 0.7–1.3)
CREAT UR-MCNC: 5.2 MG/DL (ref 0.6–1.3)
D50 ADMINISTERED, D50ADM: 0 ML
D50 ADMINISTERED, D50ADM: 14 ML
D50 ORDER, D50ORD: 0 ML
D50 ORDER, D50ORD: 14 ML
DIAGNOSIS, 93000: NORMAL
ERYTHROCYTE [DISTWIDTH] IN BLOOD BY AUTOMATED COUNT: 18.4 % (ref 11.5–14.5)
EST. AVERAGE GLUCOSE BLD GHB EST-MCNC: 243 MG/DL
GLOBULIN SER CALC-MCNC: 2.8 G/DL (ref 2–4)
GLUCOSE BLD STRIP.AUTO-MCNC: 102 MG/DL (ref 65–117)
GLUCOSE BLD STRIP.AUTO-MCNC: 119 MG/DL (ref 65–117)
GLUCOSE BLD STRIP.AUTO-MCNC: 122 MG/DL (ref 65–117)
GLUCOSE BLD STRIP.AUTO-MCNC: 126 MG/DL (ref 74–106)
GLUCOSE BLD STRIP.AUTO-MCNC: 129 MG/DL (ref 65–117)
GLUCOSE BLD STRIP.AUTO-MCNC: 136 MG/DL (ref 65–117)
GLUCOSE BLD STRIP.AUTO-MCNC: 153 MG/DL (ref 65–117)
GLUCOSE BLD STRIP.AUTO-MCNC: 173 MG/DL (ref 65–117)
GLUCOSE BLD STRIP.AUTO-MCNC: 199 MG/DL (ref 65–117)
GLUCOSE BLD STRIP.AUTO-MCNC: 210 MG/DL (ref 65–117)
GLUCOSE BLD STRIP.AUTO-MCNC: 214 MG/DL (ref 65–117)
GLUCOSE BLD STRIP.AUTO-MCNC: 215 MG/DL (ref 65–117)
GLUCOSE BLD STRIP.AUTO-MCNC: 313 MG/DL (ref 65–117)
GLUCOSE BLD STRIP.AUTO-MCNC: 416 MG/DL (ref 65–117)
GLUCOSE BLD STRIP.AUTO-MCNC: 566 MG/DL (ref 65–117)
GLUCOSE BLD STRIP.AUTO-MCNC: 65 MG/DL (ref 65–117)
GLUCOSE BLD STRIP.AUTO-MCNC: 75 MG/DL (ref 65–117)
GLUCOSE BLD STRIP.AUTO-MCNC: 89 MG/DL (ref 65–117)
GLUCOSE BLD STRIP.AUTO-MCNC: 95 MG/DL (ref 65–117)
GLUCOSE BLD STRIP.AUTO-MCNC: >600 MG/DL (ref 65–117)
GLUCOSE BLD STRIP.AUTO-MCNC: >600 MG/DL (ref 65–117)
GLUCOSE SERPL-MCNC: 123 MG/DL (ref 65–100)
GLUCOSE SERPL-MCNC: 560 MG/DL (ref 65–100)
GLUCOSE, GLC: 102 MG/DL
GLUCOSE, GLC: 119 MG/DL
GLUCOSE, GLC: 122 MG/DL
GLUCOSE, GLC: 129 MG/DL
GLUCOSE, GLC: 173 MG/DL
GLUCOSE, GLC: 199 MG/DL
GLUCOSE, GLC: 214 MG/DL
GLUCOSE, GLC: 215 MG/DL
GLUCOSE, GLC: 313 MG/DL
GLUCOSE, GLC: 416 MG/DL
GLUCOSE, GLC: 566 MG/DL
GLUCOSE, GLC: 601 MG/DL
GLUCOSE, GLC: 65 MG/DL
GLUCOSE, GLC: 75 MG/DL
GLUCOSE, GLC: 89 MG/DL
GLUCOSE, GLC: 95 MG/DL
HBA1C MFR BLD: 10.1 % (ref 4–5.6)
HCO3 BLDV-SCNC: 18.5 MMOL/L (ref 23–28)
HCT VFR BLD AUTO: 21.2 % (ref 36.6–50.3)
HGB BLD-MCNC: 7.2 G/DL (ref 12.1–17)
HIGH TARGET, HITG: 200 MG/DL
INSULIN ADMINSTERED, INSADM: 0 UNITS/HOUR
INSULIN ADMINSTERED, INSADM: 0.3 UNITS/HOUR
INSULIN ADMINSTERED, INSADM: 0.5 UNITS/HOUR
INSULIN ADMINSTERED, INSADM: 0.6 UNITS/HOUR
INSULIN ADMINSTERED, INSADM: 1.6 UNITS/HOUR
INSULIN ADMINSTERED, INSADM: 15.2 UNITS/HOUR
INSULIN ADMINSTERED, INSADM: 2.8 UNITS/HOUR
INSULIN ADMINSTERED, INSADM: 21.4 UNITS/HOUR
INSULIN ADMINSTERED, INSADM: 27.1 UNITS/HOUR
INSULIN ADMINSTERED, INSADM: 3.1 UNITS/HOUR
INSULIN ADMINSTERED, INSADM: 30.4 UNITS/HOUR
INSULIN ADMINSTERED, INSADM: 8.3 UNITS/HOUR
INSULIN ORDER, INSORD: 0 UNITS/HOUR
INSULIN ORDER, INSORD: 0.3 UNITS/HOUR
INSULIN ORDER, INSORD: 0.5 UNITS/HOUR
INSULIN ORDER, INSORD: 0.6 UNITS/HOUR
INSULIN ORDER, INSORD: 1.6 UNITS/HOUR
INSULIN ORDER, INSORD: 15.2 UNITS/HOUR
INSULIN ORDER, INSORD: 2.8 UNITS/HOUR
INSULIN ORDER, INSORD: 21.4 UNITS/HOUR
INSULIN ORDER, INSORD: 27.1 UNITS/HOUR
INSULIN ORDER, INSORD: 3.1 UNITS/HOUR
INSULIN ORDER, INSORD: 30.4 UNITS/HOUR
INSULIN ORDER, INSORD: 8.3 UNITS/HOUR
LACTATE BLD-SCNC: 1.1 MMOL/L (ref 0.4–2)
LOW TARGET, LOT: 150 MG/DL
MAGNESIUM SERPL-MCNC: 1.7 MG/DL (ref 1.6–2.4)
MAGNESIUM SERPL-MCNC: 1.7 MG/DL (ref 1.6–2.4)
MCH RBC QN AUTO: 25.6 PG (ref 26–34)
MCHC RBC AUTO-ENTMCNC: 34 G/DL (ref 30–36.5)
MCV RBC AUTO: 75.4 FL (ref 80–99)
MINUTES UNTIL NEXT BG, NBG: 15 MIN
MINUTES UNTIL NEXT BG, NBG: 60 MIN
MULTIPLIER, MUL: 0
MULTIPLIER, MUL: 0.01
MULTIPLIER, MUL: 0.01
MULTIPLIER, MUL: 0.02
MULTIPLIER, MUL: 0.02
MULTIPLIER, MUL: 0.04
MULTIPLIER, MUL: 0.05
MULTIPLIER, MUL: 0.05
MULTIPLIER, MUL: 0.06
NRBC # BLD: 0 K/UL (ref 0–0.01)
NRBC BLD-RTO: 0 PER 100 WBC
ORDER INITIALS, ORDINIT: NORMAL
P-R INTERVAL, ECG05: 144 MS
PCO2 BLDV: 35.1 MMHG (ref 41–51)
PH BLDV: 7.33 [PH] (ref 7.32–7.42)
PLATELET # BLD AUTO: 294 K/UL (ref 150–400)
PMV BLD AUTO: 9.8 FL (ref 8.9–12.9)
PO2 BLDV: 45 MMHG (ref 25–40)
POTASSIUM BLD-SCNC: 4.4 MMOL/L (ref 3.5–5.5)
POTASSIUM SERPL-SCNC: 3.5 MMOL/L (ref 3.5–5.1)
POTASSIUM SERPL-SCNC: 4.5 MMOL/L (ref 3.5–5.1)
PROT SERPL-MCNC: 4 G/DL (ref 6.4–8.2)
Q-T INTERVAL, ECG07: 390 MS
QRS DURATION, ECG06: 98 MS
QTC CALCULATION (BEZET), ECG08: 477 MS
RBC # BLD AUTO: 2.81 M/UL (ref 4.1–5.7)
SAO2 % BLDV: 77.9 % (ref 65–88)
SERVICE CMNT-IMP: ABNORMAL
SERVICE CMNT-IMP: NORMAL
SODIUM BLD-SCNC: 127 MMOL/L (ref 136–145)
SODIUM SERPL-SCNC: 124 MMOL/L (ref 136–145)
SODIUM SERPL-SCNC: 131 MMOL/L (ref 136–145)
SPECIMEN TYPE: ABNORMAL
TROPONIN-HIGH SENSITIVITY: 242 NG/L (ref 0–76)
TROPONIN-HIGH SENSITIVITY: 246 NG/L (ref 0–76)
VENTRICULAR RATE, ECG03: 90 BPM
WBC # BLD AUTO: 11.7 K/UL (ref 4.1–11.1)

## 2022-11-30 PROCEDURE — 36415 COLL VENOUS BLD VENIPUNCTURE: CPT

## 2022-11-30 PROCEDURE — 97530 THERAPEUTIC ACTIVITIES: CPT

## 2022-11-30 PROCEDURE — 83605 ASSAY OF LACTIC ACID: CPT

## 2022-11-30 PROCEDURE — 83735 ASSAY OF MAGNESIUM: CPT

## 2022-11-30 PROCEDURE — 90935 HEMODIALYSIS ONE EVALUATION: CPT

## 2022-11-30 PROCEDURE — 84484 ASSAY OF TROPONIN QUANT: CPT

## 2022-11-30 PROCEDURE — 74011000250 HC RX REV CODE- 250: Performed by: STUDENT IN AN ORGANIZED HEALTH CARE EDUCATION/TRAINING PROGRAM

## 2022-11-30 PROCEDURE — 74011250636 HC RX REV CODE- 250/636: Performed by: STUDENT IN AN ORGANIZED HEALTH CARE EDUCATION/TRAINING PROGRAM

## 2022-11-30 PROCEDURE — 74011000250 HC RX REV CODE- 250: Performed by: INTERNAL MEDICINE

## 2022-11-30 PROCEDURE — 74011636637 HC RX REV CODE- 636/637: Performed by: INTERNAL MEDICINE

## 2022-11-30 PROCEDURE — 30233N1 TRANSFUSION OF NONAUTOLOGOUS RED BLOOD CELLS INTO PERIPHERAL VEIN, PERCUTANEOUS APPROACH: ICD-10-PCS | Performed by: INTERNAL MEDICINE

## 2022-11-30 PROCEDURE — 74011250637 HC RX REV CODE- 250/637: Performed by: STUDENT IN AN ORGANIZED HEALTH CARE EDUCATION/TRAINING PROGRAM

## 2022-11-30 PROCEDURE — 80053 COMPREHEN METABOLIC PANEL: CPT

## 2022-11-30 PROCEDURE — 5A1D70Z PERFORMANCE OF URINARY FILTRATION, INTERMITTENT, LESS THAN 6 HOURS PER DAY: ICD-10-PCS | Performed by: INTERNAL MEDICINE

## 2022-11-30 PROCEDURE — 82962 GLUCOSE BLOOD TEST: CPT

## 2022-11-30 PROCEDURE — 82803 BLOOD GASES ANY COMBINATION: CPT

## 2022-11-30 PROCEDURE — 80047 BASIC METABLC PNL IONIZED CA: CPT

## 2022-11-30 PROCEDURE — 65270000046 HC RM TELEMETRY

## 2022-11-30 PROCEDURE — 97161 PT EVAL LOW COMPLEX 20 MIN: CPT

## 2022-11-30 PROCEDURE — 74011636637 HC RX REV CODE- 636/637: Performed by: STUDENT IN AN ORGANIZED HEALTH CARE EDUCATION/TRAINING PROGRAM

## 2022-11-30 PROCEDURE — 97165 OT EVAL LOW COMPLEX 30 MIN: CPT | Performed by: OCCUPATIONAL THERAPIST

## 2022-11-30 PROCEDURE — 74011000258 HC RX REV CODE- 258: Performed by: STUDENT IN AN ORGANIZED HEALTH CARE EDUCATION/TRAINING PROGRAM

## 2022-11-30 PROCEDURE — 74011250636 HC RX REV CODE- 250/636: Performed by: INTERNAL MEDICINE

## 2022-11-30 PROCEDURE — 85027 COMPLETE CBC AUTOMATED: CPT

## 2022-11-30 PROCEDURE — C9113 INJ PANTOPRAZOLE SODIUM, VIA: HCPCS | Performed by: INTERNAL MEDICINE

## 2022-11-30 PROCEDURE — 74011250636 HC RX REV CODE- 250/636: Performed by: NURSE PRACTITIONER

## 2022-11-30 PROCEDURE — 97535 SELF CARE MNGMENT TRAINING: CPT | Performed by: OCCUPATIONAL THERAPIST

## 2022-11-30 RX ORDER — ROSUVASTATIN CALCIUM 10 MG/1
10 TABLET, COATED ORAL
COMMUNITY

## 2022-11-30 RX ORDER — SEVELAMER HYDROCHLORIDE 800 MG/1
TABLET, FILM COATED ORAL
COMMUNITY

## 2022-11-30 RX ORDER — HYDROMORPHONE HYDROCHLORIDE 1 MG/ML
0.5 INJECTION, SOLUTION INTRAMUSCULAR; INTRAVENOUS; SUBCUTANEOUS ONCE
Status: COMPLETED | OUTPATIENT
Start: 2022-11-30 | End: 2022-11-30

## 2022-11-30 RX ORDER — INSULIN GLARGINE 100 [IU]/ML
8 INJECTION, SOLUTION SUBCUTANEOUS DAILY
Status: DISCONTINUED | OUTPATIENT
Start: 2022-11-30 | End: 2022-12-04

## 2022-11-30 RX ORDER — TRAMADOL HYDROCHLORIDE 50 MG/1
50 TABLET ORAL ONCE
Status: COMPLETED | OUTPATIENT
Start: 2022-12-01 | End: 2022-12-01

## 2022-11-30 RX ORDER — MAGNESIUM SULFATE 100 %
4 CRYSTALS MISCELLANEOUS AS NEEDED
Status: DISCONTINUED | OUTPATIENT
Start: 2022-11-30 | End: 2022-12-06 | Stop reason: HOSPADM

## 2022-11-30 RX ORDER — INSULIN LISPRO 100 [IU]/ML
INJECTION, SOLUTION INTRAVENOUS; SUBCUTANEOUS
Status: DISCONTINUED | OUTPATIENT
Start: 2022-11-30 | End: 2022-12-06 | Stop reason: HOSPADM

## 2022-11-30 RX ADMIN — FINASTERIDE 5 MG: 5 TABLET, FILM COATED ORAL at 09:50

## 2022-11-30 RX ADMIN — AMLODIPINE BESYLATE 10 MG: 5 TABLET ORAL at 09:50

## 2022-11-30 RX ADMIN — Medication 8 UNITS: at 15:16

## 2022-11-30 RX ADMIN — TAMSULOSIN HYDROCHLORIDE 0.4 MG: 0.4 CAPSULE ORAL at 09:50

## 2022-11-30 RX ADMIN — HYDRALAZINE HYDROCHLORIDE 100 MG: 50 TABLET, FILM COATED ORAL at 22:04

## 2022-11-30 RX ADMIN — SODIUM CHLORIDE: 900 INJECTION, SOLUTION INTRAVENOUS at 06:29

## 2022-11-30 RX ADMIN — HYDROMORPHONE HYDROCHLORIDE 0.5 MG: 1 INJECTION, SOLUTION INTRAMUSCULAR; INTRAVENOUS; SUBCUTANEOUS at 06:28

## 2022-11-30 RX ADMIN — CARVEDILOL 12.5 MG: 12.5 TABLET, FILM COATED ORAL at 09:50

## 2022-11-30 RX ADMIN — SODIUM CHLORIDE 30.4 UNITS/HR: 9 INJECTION, SOLUTION INTRAVENOUS at 02:00

## 2022-11-30 RX ADMIN — HYDRALAZINE HYDROCHLORIDE 100 MG: 50 TABLET, FILM COATED ORAL at 09:50

## 2022-11-30 RX ADMIN — PREGABALIN 75 MG: 25 CAPSULE ORAL at 22:05

## 2022-11-30 RX ADMIN — DEXTROSE MONOHYDRATE 125 ML: 100 INJECTION, SOLUTION INTRAVENOUS at 08:42

## 2022-11-30 RX ADMIN — ASPIRIN 81 MG: 81 TABLET, CHEWABLE ORAL at 09:50

## 2022-11-30 RX ADMIN — SODIUM CHLORIDE 2.8 UNITS/HR: 9 INJECTION, SOLUTION INTRAVENOUS at 06:25

## 2022-11-30 RX ADMIN — PANTOPRAZOLE SODIUM 40 MG: 40 TABLET, DELAYED RELEASE ORAL at 09:50

## 2022-11-30 RX ADMIN — DULOXETINE HYDROCHLORIDE 60 MG: 30 CAPSULE, DELAYED RELEASE ORAL at 09:50

## 2022-11-30 RX ADMIN — SODIUM CHLORIDE 40 MG: 9 INJECTION, SOLUTION INTRAMUSCULAR; INTRAVENOUS; SUBCUTANEOUS at 22:04

## 2022-11-30 NOTE — ED NOTES
Assumed care of pt from triage. Pt complaining of nausea and vomiting x2 days. Pt on monitor x3 with call bell in reach. Pts family states he has not been to dialysis since Friday. Pts family member at bedside at this time. 1915: Bedside and Verbal shift change report given to ASAD Ames (oncoming nurse) by Amberly Mejia RN (offgoing nurse). Report included the following information SBAR, ED Summary, MAR, and Recent Results.

## 2022-11-30 NOTE — ED NOTES
Bedside and Verbal shift change report given to Radha RN (oncoming nurse) by Winston Chaney RN (offgoing nurse). Report included the following information SBAR, ED Summary, MAR, and Recent Results.

## 2022-11-30 NOTE — PROGRESS NOTES
Problem: Self Care Deficits Care Plan (Adult)  Goal: *Acute Goals and Plan of Care (Insert Text)  Description: FUNCTIONAL STATUS PRIOR TO ADMISSION: just recently at [facility] and was intubated had MI and was on CRRT, returned to home with his mother and has been needing assist with mobility from his mother, performed ADLS on his own, mother performs IADLs, gets medial transport to dialysis, prior to previous hospital stay pt was independent with mobility and ADLS, no HH prior to admit and pt reported he was seen by OT/PT at [facility] and had no needs    HOME SUPPORT PRIOR TO ADMISSION: The patient lived with mother and uncle that have been assisting. Occupational Therapy Goals:  Initiated 11/30/2022  1. Patient will perform grooming standing with modified independence within 7 days. 2. Patient will perform upper body dressing and lower body dressing with modified independence within 7 days. 3. Patient will perform toileting with modified independence within 7 days. 4. Patient will transfer from toilet with modified independence using the least restrictive device and appropriate durable medical equipment within 7 days. Outcome: Not Met   OCCUPATIONAL THERAPY EVALUATION  Patient: Hershel Curling (38 y.o. male)  Date: 11/30/2022  Primary Diagnosis: DKA (diabetic ketoacidosis) ([facility] [E11.10]       Precautions: fall       ASSESSMENT  Based on the objective data described below, the patient presents with fatigue, functional weakness and report of feeling poorly. He reports that when he returned from home from previous hospital admit at [facility] (intubated, CRRT, MI) he was needing assist with mobility and IADLS from his mother. He is normally independent with all tasks prior to recent illness. Overall pt is performing mobility and ADLs at a set up to min assist level. Recommend home health at discharge.        Current Level of Function Impacting Discharge (ADLs/self-care): CGA to occasional min assist for mobility without assist devices    Feeding: Independent (inferred pt is currently NPO)    Oral Facial Hygiene/Grooming: Setup    Bathing: Minimum assistance    Upper Body Dressing: Setup    Lower Body Dressing: Minimum assistance    Toileting: Minimum assistance       Functional Outcome Measure: The patient scored 55/100 on the barthel outcome measure which is indicative of moderate decline in mobility and ADLS. Other factors to consider for discharge: none     Patient will benefit from skilled therapy intervention to address the above noted impairments. PLAN :  Recommendations and Planned Interventions: self care training, functional mobility training, therapeutic exercise, balance training, therapeutic activities, patient education, home safety training, and family training/education    Frequency/Duration: Patient will be followed by occupational therapy 4 times a week to address goals. Recommendation for discharge: (in order for the patient to meet his/her long term goals)  Occupational therapy at least 2 days/week in the home AND ensure assist and/or supervision for safety with mobility and ADLs as needed    This discharge recommendation:  Has been made in collaboration with the attending provider and/or case management    IF patient discharges home will need the following DME: none       SUBJECTIVE:   Patient stated I am dizzy.     OBJECTIVE DATA SUMMARY:   HISTORY:   Past Medical History:   Diagnosis Date    Bipolar 1 disorder, depressed ([facility]     Bipolar disorder ([facility]     Chronic kidney disease, stage 3a ([address]     Depression     Diabetes ([address]     DKA, type 1 ([address] 1/27/2013    diagnosed age 23    DKA, type 1 ([address]     H/O noncompliance with medical treatment, presenting hazards to health     MRSA (methicillin resistant staph aureus) culture positive     MRSA (methicillin resistant Staphylococcus aureus)     Face    Noncompliance with medication regimen     Second hand smoke exposure     Seizure ([address] Seizures ([facility] 2006 or 2007    one episode during penitentiary     Past Surgical History:   Procedure Laterality Date    HX HEENT      top left wisdom tooth    HX ORTHOPAEDIC Left     wrist; MCV    IR INSERT NON TUNL CVC OVER 5 YRS  9/7/2022    IR INSERT TUNL CVC W/O PORT OVER 5 YR  9/9/2022    UPPER GI ENDOSCOPY,BIOPSY  11/20/2018            Expanded or extensive additional review of patient history:     Home Situation  Home Environment: Private residence  # Steps to Enter: 7  Rails to Enter: Yes  Wheelchair Ramp: Yes  One/Two Story Residence: One story  Living Alone: No  Support Systems: Parent(s), Other Family Member(s)  Patient Expects to be Discharged to[de-identified] Home with home health  Current DME Used/Available at Home: Cane, straight    Hand dominance: Right    EXAMINATION OF PERFORMANCE DEFICITS:  Cognitive/Behavioral Status:  Neurologic State: Alert; Appropriate for age  Orientation Level: Appropriate for age;Oriented X4  Cognition: Appropriate decision making; Follows commands; Appropriate for age attention/concentration        Safety/Judgement: Awareness of environment    Vision/Perceptual:                           Acuity: Within Defined Limits         Range of Motion:    AROM: Generally decreased, functional  PROM: Within functional limits                      Strength:    Strength: Generally decreased, functional                Coordination:  Coordination: Within functional limits  Fine Motor Skills-Upper: Left Intact; Right Intact    Gross Motor Skills-Upper: Left Intact; Right Intact    Tone & Sensation:    Tone: Normal  Sensation: Impaired (hands/feet)                      Balance:  Sitting: Intact  Standing: Impaired  Standing - Static: Constant support;Good  Standing - Dynamic : Constant support; Fair    Functional Mobility and Transfers for ADLs:  Bed Mobility:  Supine to Sit: Minimum assistance    Transfers:  Sit to Stand: Contact guard assistance  Stand to Sit: Contact guard assistance  Bed to Chair: Contact guard assistance;Minimum assistance  Bathroom Mobility:  (unable at this time)  Toilet Transfer : Contact guard assistance;Minimum assistance    ADL Assessment:  Feeding: Independent (inferred pt is currently NPO)    Oral Facial Hygiene/Grooming: Setup    Bathing: Minimum assistance    Upper Body Dressing: Setup    Lower Body Dressing: Minimum assistance    Toileting: Minimum assistance                  ADL Intervention and task modifications:     Min assist to manage brief in standing. CGA to min assist standing next to stretcher to use urinal.  Min assist to side step head of stretcher with hand held assist.     Cognitive Retraining  Safety/Judgement: Awareness of environment      Functional Measure:    Barthel Index:  Bathin  Bladder: 10  Bowels: 10  Groomin  Dressin  Feeding: 10  Mobility: 0  Stairs: 0  Toilet Use: 5  Transfer (Bed to Chair and Back): 10  Total: 55/100      The Barthel ADL Index: Guidelines  1. The index should be used as a record of what a patient does, not as a record of what a patient could do. 2. The main aim is to establish degree of independence from any help, physical or verbal, however minor and for whatever reason. 3. The need for supervision renders the patient not independent. 4. A patient's performance should be established using the best available evidence. Asking the patient, friends/relatives and nurses are the usual sources, but direct observation and common sense are also important. However direct testing is not needed. 5. Usually the patient's performance over the preceding 24-48 hours is important, but occasionally longer periods will be relevant. 6. Middle categories imply that the patient supplies over 50 per cent of the effort. 7. Use of aids to be independent is allowed.     Score Interpretation (from [address] [place] [address])    Independent   60-79 Minimally independent   40-59 Partially dependent   20-39 Very dependent   <20 Totally dependent     -Irma F.l., Barthel DWillamW. (1965). Functional evaluation: the Barthel Index. [address] ([address] (1997). The Barthel activities of daily living index: self-reporting versus actual performance in the old (> or = 75 years). Journal of [facility]4 [facility] 45(7), 14 [facility], J.JANET, Yeison Cullen., Nancy Garcia (1999). Measuring the change in disability after inpatient rehabilitation; comparison of the responsiveness of the Barthel Index and Functional [place] Measure. Journal of Neurology, Neurosurgery, and Psychiatry, 66(4), 092-514. Santiago Cuevas, N.KOKO.A, ANDERS Barnes, & Herminio Meneses MWillamA. (2004) Assessment of post-stroke quality of life in cost-effectiveness studies: The usefulness of the Barthel Index and the EuroQoL-5D. Quality of Life Research, 15, 708-51           Occupational Therapy Evaluation Charge Determination   History Examination Decision-Making   HIGH Complexity : Extensive review of history including physical, cognitive and psychosocial history  HIGH Complexity : 5 or more performance deficits relating to physical, cognitive , or psychosocial skils that result in activity limitations and / or participation restrictions HIGH Complexity : Patient presents with comorbidities that affect occupational performance. Signifigant modification of tasks or assistance (eg, physical or verbal) with assessment (s) is necessary to enable patient to complete evaluation       Based on the above components, the patient evaluation is determined to be of the following complexity level: HIGH     Activity Tolerance:   Poor and requires frequent rest breaks    After treatment patient left in no apparent distress:    Supine in bed, Call bell within reach, and bilateral rails up on stretcher as prior to session    COMMUNICATION/EDUCATION:   The patients plan of care was discussed with: Physical therapist, Registered nurse, and patient .      Home safety education was provided and the patient/caregiver indicated understanding., Patient/family have participated as able in goal setting and plan of care. , and Patient/family agree to work toward stated goals and plan of care. This patients plan of care is appropriate for delegation to GURVINDER.     Thank you for this referral.  Wing Zheng, OTR/L  Time Calculation: 15 mins

## 2022-11-30 NOTE — ED NOTES
Bedside shift change report given to ASAD Ames (oncoming nurse) by ASAD Ortiz (offgoing nurse). Report included the following information SBAR, ED Summary, and Recent Results. Assumed care of patient and provided a pillow for comfort at this time. 2022- Intensivist at bedside for evaluation. 0717-Bedside shift change report given to ASAD Perkins (oncoming nurse) by ASAD Ames (offgoing nurse). Report included the following information SBAR, Kardex, and Recent Results.

## 2022-11-30 NOTE — PROGRESS NOTES
Pt arrive to room 2220, A&O, states 7/10 mid sternal sharp pain, Per Pt MD aware. Dual skin assessment completed with 2nd RN Cristopher Kwong. Abrasion noted to right dorsal area near right great toe. . VSS.

## 2022-11-30 NOTE — PROGRESS NOTES
1317:  TRANSFER - IN REPORT:    Verbal report received from [employer] (name) on Yajaira Taylor  being received from ED (unit) for routine progression of care      Report consisted of patients Situation, Background, Assessment and   Recommendations(SBAR). Information from the following report(s) SBAR, Kardex, MAR, and Cardiac Rhythm NSr  was reviewed with the receiving nurse. Opportunity for questions and clarification was provided. Assessment completed upon patients arrival to unit and care assumed. No BMP drawn in greater than 12 hours despite Q4H orders in chart, nor Mag & repeat Lactic Acid. ED RN made aware that those labs need to be drawn prior to patient coming to [facility]. We cannot transition off insulin gtt without necessary labs listed above. 1325:  Confirmed with ASAD Harding that CMP was drawn prior to patient being transported. 1433:  Troponin up to 246 (up frpm 188 yesterday). MD Carole made aware. Repeat trp ordered for 1500.    8016:  Bedside shift change report given to [address] (oncoming nurse) by Klaudia Brewster (offgoing nurse). Report included the following information SBAR, Kardex, MAR, and Cardiac Rhythm NSR .      1728: Insulin gtt stopped and disconnected 2 hours post lantus administration. Diet restarted per MD order. 1808:  Troponin 242, down from 246. MD notified. No orders received. End of Shift Note    Bedside shift change report given to [address] (oncoming nurse) by Kyleigh Vernon RN (offgoing nurse). Report included the following information SBAR, Kardex, MAR, and Cardiac Rhythm NSR    Shift worked:  7a-7p     Shift summary and any significant changes:    See above     Concerns for physician to address: Chest pain. Elevated troponins, Attending MD made aware multiple times   Zone phone for oncoming shift:       Activity:  Activity Level:  Up with Assistance  Number times ambulated in hallways past shift: 0  Number of times OOB to chair past shift: 0    Cardiac:   Cardiac Monitoring: Yes      Cardiac Rhythm: Sinus Rhythm    Access:  Current line(s): PIV , permacath    Genitourinary:   Urinary status: voiding    Respiratory:   O2 Device: None (Room air)  Chronic home O2 use?: NO  Incentive spirometer at bedside: NO       GI:  Last Bowel Movement Date: 11/30/22  Current diet:  ADULT DIET Regular; 4 carb choices (60 gm/meal); Low Potassium (Less than 3000 mg/day)  Passing flatus: YES  Tolerating current diet: YES       Pain Management:   Patient states pain is manageable on current regimen: YES    Skin:  Corey Score: 19  Interventions: increase time out of bed    Patient Safety:  Fall Score:  Total Score: 3  Interventions: bed/chair alarm  High Fall Risk: Yes    Length of Stay:  Expected LOS: - - -  Actual LOS: 1      Jef Jones RN

## 2022-11-30 NOTE — PROGRESS NOTES
Reason for Admission:   DKA                 RUR Score:  36%         PCP: First and Last name:  Maykel Kelly MD     Name of Practice:   Are you a current patient: Yes/No:   Approximate date of last visit: Unsure   Can you do a virtual visit with your PCP:              Resources/supports as identified by patient/family:                   Top Challenges facing patient (as identified by patient/family and CM): Finances/Medication cost?  No issues                  Transportation? No issues              Support system or lack thereof? No issues                     Living arrangements? No issues              Self-care/ADLs/Cognition? No issues          Current Advanced Directive/Advance Care Plan:  Full Code  CM confirmed patient is a Full Code    Healthcare Decision Maker:   Click here to complete [address] including selection of the Healthcare Decision Maker Relationship (ie \"Primary\")      Primary Decision MakeVeronica Beth - [phone]    Payor Source Payor: [facility] MEDICAID / Plan: [address] [place] [address] / Product Type: Managed Care Medicaid /                             Plan for utilizing home health:                     Transition of Care Plan:                Patient lives at home with his mother and uncle. Patient uses no DME and is independent in care. Patient's family will be his ride at discharge. Current Dispo: home/self.

## 2022-11-30 NOTE — PROGRESS NOTES
Problem: Mobility Impaired (Adult and Pediatric)  Goal: *Acute Goals and Plan of Care (Insert Text)  Description: FUNCTIONAL STATUS PRIOR TO ADMISSION: Patient was mod. independent and active without use of DME (prior to recent [facility] hospitalization). .. since returning home from [address] [place] [address], pt's family (Mom) have assisted prn in the home    [address]: The patient lived with family but did not require assist prior to hospitalization at [address] [place] [address] (just recently, assist prn for safety with mobility) ; no device use     Physical Therapy Goals  Initiated 11/30/2022  1. Patient will move from supine to sit and sit to supine , scoot up and down, and roll side to side in bed with modified independence within 7 day(s). 2.  Patient will transfer from bed to chair and chair to bed with modified independence using the least restrictive device within 7 day(s). 3.  Patient will perform sit to stand with modified independence within 7 day(s). 4.  Patient will ambulate with modified independence for 200 feet with the least restrictive device within 7 day(s). Outcome: Progressing Towards Goal     PHYSICAL THERAPY EVALUATION  Patient: Naomie Hallman (89 y.o. male)  Date: 11/30/2022  Primary Diagnosis: DKA (diabetic ketoacidosis) ([facility] [E11.10]       Precautions: Fall         ASSESSMENT  Nurse clears pt for mobility. He is reporting lethargy this date and agreeable to sitting up, standing to urinate side of bed, and taking a few steps before getting back to bed. .. also c/o dizziness with upright activity, vitals stable during session. Based on the objective data described below, the patient presents with generalized weakness (had not fully recovered back to PLOF since recent [facility] hospitalization). Likely can return home with HHPT and continued assist prn from family. .. will continue to follow and advance mobility efforts as pt able.     Patient Vitals for the past 8 hrs:   Temp Pulse Resp BP SpO2 11/30/22 1108 -- -- -- 129/83 --   11/30/22 1103 -- 79 -- 113/66 100 %     Current Level of Function Impacting Discharge (mobility/balance): bed mob. Min. ; transfers CGA ; gait CGA, [facility]    Functional Outcome Measure: The patient scored Total Score: 15/28 on the Tinetti outcome measure which is indicative of high fall risk. Other factors to consider for discharge: pt has support of family ; recent hospitalization at [address] [place] [address]     Patient will benefit from skilled therapy intervention to address the above noted impairments. PLAN :  Recommendations and Planned Interventions: bed mobility training, transfer training, gait training, therapeutic exercises, edema management/control, patient and family training/education, and therapeutic activities      Frequency/Duration: Patient will be followed by physical therapy:  4 times a week to address goals. Recommendation for discharge: (in order for the patient to meet his/her long term goals)  Physical therapy at least 2 days/week in the home AND ensure assist and/or supervision for safety with mobility (Pending progress)    This discharge recommendation:  Has been made in collaboration with the attending provider and/or case management    IF patient discharges home will need the following DME: none likely needed (will continue to assess)         SUBJECTIVE:   Patient stated I feel really bad.       OBJECTIVE DATA SUMMARY:   HISTORY:    Past Medical History:   Diagnosis Date    Bipolar 1 disorder, depressed ([facility] [address]     Bipolar disorder ([facility] [address] [facility]     Chronic kidney disease, stage 3a ([facility] [address]     Depression     Diabetes ([facility] [address]     DKA, type 1 ([facility] [address] 1/27/2013    diagnosed age 23    DKA, type 1 ([facility] [address]     H/O noncompliance with medical treatment, presenting hazards to health     MRSA (methicillin resistant staph aureus) culture positive     MRSA (methicillin resistant Staphylococcus aureus)     Face    Noncompliance with medication regimen     Second hand smoke exposure Seizure ([facility] [address]     Seizures ([facility] [address] 2006 or 2007    one episode during jail     Past Surgical History:   Procedure Laterality Date    HX HEENT      top left wisdom tooth    HX ORTHOPAEDIC Left     wrist; MCV    IR INSERT NON TUNL CVC OVER 5 YRS  9/7/2022    IR INSERT TUNL CVC W/O PORT OVER 5 YR  9/9/2022    UPPER GI ENDOSCOPY,BIOPSY  11/20/2018            Personal factors and/or comorbidities impacting plan of care:     Home Situation  Home Environment: Private residence  # Steps to Enter: 7  Rails to Enter: Yes  Wheelchair Ramp: Yes  One/Two Story Residence: One story  Living Alone: No  Support Systems: Parent(s), Other Family Member(s)  Patient Expects to be Discharged to[de-identified] Home  Current DME Used/Available at Home: None    EXAMINATION/PRESENTATION/DECISION MAKING:   Critical Behavior:  Neurologic State: Alert  Orientation Level: Oriented X4  Cognition: Appropriate decision making, Appropriate for age attention/concentration, Appropriate safety awareness, Follows commands  Safety/Judgement: Awareness of environment  Hearing: Auditory  Auditory Impairment: None  Skin:  Intact (abrasion on toe per chart, not observed this date)  Edema: slight BUE's  Range Of Motion:  AROM: Generally decreased, functional           PROM: Within functional limits           Strength:    Strength: Generally decreased, functional                    Tone & Sensation:   Tone: Normal              Sensation: Impaired (hands/feet)               Coordination:  Coordination: Within functional limits  Vision:   Acuity: Within Defined Limits  Functional Mobility:  Bed Mobility:     Supine to Sit: Minimum assistance        Transfers:  Sit to Stand: Contact guard assistance  Stand to Sit: Contact guard assistance             Balance:   Sitting: Intact  Standing: Impaired  Standing - Static: Constant support;Good  Standing - Dynamic : Constant support; Fair  Ambulation/Gait Training:  Distance (ft): 3 Feet (ft) (lateral side-steps toward [facility]) Ambulation - Level of Assistance: Contact guard assistance; Other (comment) (HHA)     Gait Description (WDL): Exceptions to WDL           Base of Support: Widened     Speed/Ana: Pace decreased (<100 feet/min)  Step Length: Right shortened;Left shortened        Interventions: Verbal cues            Functional Measure:  Tinetti test:    Sitting Balance: 1  Arises: 1  Attempts to Rise: 2  Immediate Standing Balance: 1  Standing Balance: 1  Nudged: 1  Eyes Closed: 1  Turn 360 Degrees - Continuous/Discontinuous: 0  Turn 360 Degrees - Steady/Unsteady: 0  Sitting Down: 1  Balance Score: 9 Balance total score  Indication of Gait: 0  R Step Length/Height: 0  L Step Length/Height: 0  R Foot Clearance: 1  L Foot Clearance: 1  Step Symmetry: 1  Step Continuity: 1  Path: 1  Trunk: 1  Walking Time: 0  Gait Score: 6 Gait total score  Total Score: 15/28 Overall total score         Tinetti Tool Score Risk of Falls  <19 = High Fall Risk  19-24 = Moderate Fall Risk  25-28 = Low Fall Risk  Tinetti ME. Performance-Oriented Assessment of Mobility Problems in Elderly Patients. Pichardo 66; [id number].  (Scoring Description: PT Bulletin Feb. 10, 1993)    Older adults: Marisol Tesfaye et al, 2009; n = 1000 [facility] elderly evaluated with ABC, MARY, ADL, and IADL)  · Mean MARY score for males aged 69-68 years = 26.21(3.40)  · Mean MARY score for females age 69-68 years = 25.16(4.30)  · Mean MARY score for males over 80 years = 23.29(6.02)  · Mean MARY score for females over 80 years = 17.20(8.32)        Physical Therapy Evaluation Charge Determination   History Examination Presentation Decision-Making   MEDIUM  Complexity : 1-2 comorbidities / personal factors will impact the outcome/ POC  LOW Complexity : 1-2 Standardized tests and measures addressing body structure, function, activity limitation and / or participation in recreation  LOW Complexity : Stable, uncomplicated  Other outcome measures Tinetti  LOW       Based on the above components, the patient evaluation is determined to be of the following complexity level: LOW     Pain Rating:  No c/o pain    Activity Tolerance:   Fair      After treatment patient left in no apparent distress:   Supine in bed, Call bell within reach, and nurse notified     COMMUNICATION/EDUCATION:   The patients plan of care was discussed with: Occupational therapist, Registered nurse, and Case management. Fall prevention education was provided and the patient/caregiver indicated understanding., Patient/family have participated as able in goal setting and plan of care. , and Patient/family agree to work toward stated goals and plan of care.     Thank you for this referral.  Karmen Damon, PT, DPT   Time Calculation: 17 mins

## 2022-11-30 NOTE — ED NOTES
Assumed care of pt. Pt resting in bed comfortably at this time. Pt remains on monitor x3 with call bell in reach. 0820: Pt remains on monitor x3 with call bell in reach and resting comfortably in stretcher at this time. 0920: Pt has no complaints at this time. Pt remains on monitor x3 with call bell in reach. 3722: Pts BG 89- insulin drip paused at this time per glucostabilizer. 1020: Pt on monitor x3 with call bell in reach and no complaints at this time. 1059: Pts - insulin drip remains paused per glucostabilizer. 1120: Pt remains resting in stretcher and on monitor x3 with call bell in reach.      1200: Pts - insulin drip remains paused per glucostabilizer

## 2022-11-30 NOTE — PROGRESS NOTES
Received notification from bedside RN about patient with regards to: pain around [facility] site, most pronounced when coughing.  Requesting medication for relief  VS: /69, HR 86, RR 26, O2 sat 99%     Intervention given: Dilaudid 0.5 mg IV x 1 dose ordered

## 2022-11-30 NOTE — ED NOTES
TRANSITION OF CARE - SBAR OUT    Patient is being transferred to [address], Room# 2220. Report GIVEN TO Kane Monroy RN on [facility] for routine progression of care. Report is consisted of the following information SBAR, ED Summary, MAR, and Recent Results. Patient transferred to receiving unit by: [facility] and ASAD Leach (RN or Tech Name)     Laila Don mayte consults: Yes   Collected routine labs: Yes     All current orders reviewed with accepting nurse: Yes    The following personal items will be sent with the patient during transfer to the floor: All valuables:                          CARDIAC MONITORING ORDERED: Yes     The following CURRENT information were reported to the receiving RN:    CODE STATUS: Full Code    NIH SCORE:    THAIS SCREENING:      NEURO ASSESSMENT: Neuro  Neurologic State: Alert, Appropriate for age (11/30/22 1101)  Orientation Level: Appropriate for age, Oriented X4 (11/30/22 1101)  Cognition: Appropriate decision making, Follows commands, Appropriate for age attention/concentration (11/30/22 1101)      RESTRAINTS IN USE: No      IS DOCUMENTATION COMPLETE: Yes      Vital Signs  Level of Consciousness: Alert (0) (11/30/22 0830)  Temp: 98.2 °F (36.8 °C) (11/30/22 0830)  Temp Source: Oral (11/30/22 0830)  Pulse (Heart Rate): 78 (11/30/22 1245)  Resp Rate: 17 (11/30/22 1245)  BP: (!) 110/56 (11/30/22 1245)  MAP (Monitor): 73 (11/30/22 1245)  MAP (Calculated): 74 (11/30/22 1245)  BP Patient Position: Sitting (11/30/22 1108)  MEWS Score: 1 (11/30/22 0830)         OXYGEN: Oxygen Therapy  O2 Device: None (Room air) (11/30/22 1103)    KINDER FALL ASSESSMENT:  Presents to emergency department  because of falls (Syncope, seizure, or loss of consciousness): No, Age > 79:  No, Altered Mental Status, Intoxication with alcohol or substance confusion (Disorientation, impaired judgment, poor safety awaremess, or inability to follow instructions): Yes, Impaired Mobility: Ambulates or transfers with assistive devices or assistance; Unable to ambulate or transer.: No    WOUNDS: No      URINARY CATHETER: voiding    LINE ACCESS:   Peripheral IV 11/29/22 Right Antecubital (Active)       Peripheral IV 54/04/34 Left Basilic (Active)   Site Assessment Clean, dry, & intact 11/29/22 2031   Phlebitis Assessment 0 11/29/22 2031   Infiltration Assessment 0 11/29/22 2031   Dressing Status Clean, dry, & intact 11/29/22 2031   Dressing Type Tape;Transparent 11/29/22 2031   Hub Color/Line Status Green;Flushed;Patent 11/29/22 2031   Alcohol Cap Used No 11/29/22 2031        Opportunity for questions and clarification were provided.   Colonel Roosevelt RN

## 2022-11-30 NOTE — PROGRESS NOTES
Nephrology Progress Note  [facility] / [address], [place], [address]  Phone - [phone]  Fax - [phone]                 Patient: Miguel Mcfadden                   YOB: 1980        Date- 11/30/2022                      Admit Date: 11/29/2022  CC: Follow up for esrd          IMPRESSION & PLAN:   Esrd-- hd mwf at [facility]  Hyponatremia  na 115 on 11-29-22 with glucose 1181. Hyperkalemia  DKA  H/o Urinary retention requiring hansen cath  Type 1 diabetes  Hypertension  Anemia      PLAN-  Hd today  Continue hd mwf  He had hd on Monday at [facility]  Continue norvasc, coreg, hydralazine       Subjective: Interval History:   -  he has h/o esrd- he is on hd at [facility]. He has been to [facility] last week. He was discharged from [facility] recently. Objective:   Vitals:    11/30/22 0530 11/30/22 0545 11/30/22 0715 11/30/22 0730   BP: 138/71 (!) 151/71 104/66 125/67   Pulse: 84 85 81 88   Resp: 14 17 16 18   Temp:       SpO2: 97% 99% 97% 96%   Weight:       Height:          11/29 0701 - 11/30 0700  In: 100 [I.V.:100]  Out: -   Last 3 Recorded Weights in this Encounter    11/29/22 1750 11/29/22 1845   Weight: 77.1 kg (170 lb) 77.1 kg (169 lb 15.6 oz)        Physical exam:    GEN: NAD  NECK- no mass  RESP: No wheezing, decreased BS b/l  CVS: S1,S2  RRR  NEURO: Normal speech, Non focal  PSYCH: Normal Mood    Chart reviewed. Pertinent Notes reviewed.      Data Review :  Recent Labs     11/30/22  0211 11/29/22  2048 11/29/22  1855   * 117* 115*   K 3.5 4.1 4.0   CL 92* 83* 80*   CO2 21 14* 12*   BUN 65* 64* 65*   CREA 5.83* 5.95* 6.11*   * 1,013* 1,181*   CA 7.4* 7.7* 7.8*   MG 1.7 2.0  --    PHOS  --  6.0*  --      Recent Labs     11/30/22  0211 11/29/22  1855   WBC 11.7* 15.2*   HGB 7.2* 8.9*   HCT 21.2* 28.3*    392     No results for input(s): FE, TIBC, PSAT, FERR in the last 72 hours.    Lab Results   Component Value Date/Time    Hemoglobin A1c 10.1 (H) 11/29/2022 08:48 PM    Hemoglobin A1c 7.2 (H) 09/26/2022 09:34 AM    Hemoglobin A1c 8.7 (H) 08/12/2022 12:41 AM      Lab Results   Component Value Date/Time    Microalbumin/Creat ratio (mg/g creat) 11,439 (H) 04/01/2021 10:00 AM    Microalbumin,urine random 151.00 04/01/2021 10:00 AM     Lab Results   Component Value Date/Time    NT pro-BNP 32,663 (H) 11/29/2022 06:55 PM    NT pro-BNP 3,665 (H) 10/22/2022 02:12 AM    NT pro-BNP 5,688 (H) 09/06/2022 09:16 AM    NT pro-BNP 11,477 (H) 08/16/2022 12:50 AM    NT pro-BNP 4,600 (H) 08/09/2022 02:13 PM     US Results (most recent):  Medication list  reviewed  Current Facility-Administered Medications   Medication Dose Route Frequency    insulin regular (NOVOLIN R, HUMULIN R) 100 Units in 0.9% sodium chloride 100 mL infusion  1 Units/hr IntraVENous TITRATE    insulin lispro (HUMALOG) injection   SubCUTAneous TIDAC    glucose chewable tablet 16 g  4 Tablet Oral PRN    glucagon (GLUCAGEN) injection 1 mg  1 mg IntraMUSCular PRN    dextrose 10% infusion 0-250 mL  0-250 mL IntraVENous PRN    tamsulosin (FLOMAX) capsule 0.4 mg  0.4 mg Oral DAILY    carvediloL (COREG) tablet 12.5 mg  12.5 mg Oral BID WITH MEALS    amLODIPine (NORVASC) tablet 10 mg  10 mg Oral DAILY    cloNIDine (CATAPRES) 0.1 mg/24 hr patch 1 Patch  1 Patch TransDERmal Q7D    finasteride (PROSCAR) tablet 5 mg  5 mg Oral DAILY    pantoprazole (PROTONIX) tablet 40 mg  40 mg Oral ACB    DULoxetine (CYMBALTA) capsule 60 mg  60 mg Oral DAILY    aspirin chewable tablet 81 mg  81 mg Oral DAILY    hydrALAZINE (APRESOLINE) tablet 100 mg  100 mg Oral TID    pregabalin (LYRICA) capsule 75 mg  75 mg Oral QHS    Please check with Patient as to what day they need the Clonidine patch placed  1 Each Other DAILY    ondansetron (ZOFRAN) injection 4 mg  4 mg IntraVENous Q4H PRN     Current Outpatient Medications   Medication Sig    hydrALAZINE (APRESOLINE) 100 mg tablet Take 1 Tablet by mouth three (3) times daily. pregabalin (Lyrica) 75 mg capsule Take 1 Capsule by mouth nightly. Max Daily Amount: 75 mg.    insulin glargine (LANTUS) 100 unit/mL injection Take 10 units once daily at night    insulin lispro (HUMALOG) 100 unit/mL injection INITIATE CORRECTIVE INSULIN PROTOCOL (FREDA): High Sensitivity (thin, ESRD): AC (before meals), Q6H CORRECTIONAL SCALE only For Blood Sugar (mg/dl) of :           140-199=0 units          200-249=2 units 250-299=3 units 300-349=4 units 350-400=6 units 401 or greater = Call MD Give in addition to basal medications. Do Not Hold for NPO BEDTIME CORRECTIONAL sliding scale when scheduled: 200-249=1 units 250-349=2 units 350-400=3 units 401 or greater = Call MD Give in addition to basal medications. Do Not Hold for NPO Fast Acting - Administer Immediately - or within 15 minutes of start of meal, if mealtime coverage. aspirin 81 mg chewable tablet Take 1 Tablet by mouth daily. lancets [facility]    Blood-Glucose Meter monitoring kit [facility]    glucose blood VI test strips (blood glucose test) strip [facility]    Insulin Syringe-Needle U-100 1 mL 28 x 5/16\" syrg 1 Syringe by SubCUTAneous route Before breakfast, lunch, dinner and at bedtime. DULoxetine (CYMBALTA) 60 mg capsule Take 1 capsule by mouth once daily    naloxone (Narcan) 4 mg/actuation nasal spray Use 1 spray intranasally, then discard. Repeat with new spray every 2 min as needed for opioid overdose symptoms, alternating nostrils. bacitracin zinc (BACITRACIN) ointment Apply  to affected area two (2) times a day. (Patient not taking: Reported on 11/2/2022)    Ketone Blood Test strp 50 Each by Does Not Apply route as needed for PRN Reason (Other) (as needed for suspected dka). pantoprazole (PROTONIX) 40 mg tablet Take 1 Tablet by mouth Daily (before breakfast). finasteride (PROSCAR) 5 mg tablet Take 1 Tablet by mouth in the morning.     Walker (Ultra-Light Rollator) misc Use while ambulating    amLODIPine (NORVASC) 10 mg tablet Take 1 Tablet by mouth daily. cloNIDine (CATAPRES) 0.1 mg/24 hr ptwk 1 Patch by TransDERmal route every seven (7) days. Dexcom G6  misc Use with the dexcom device to check blood sugars 4 times a day    Dexcom G6 Sensor brandi Use as directed every 10 days    Dexcom G6 Transmitter brandi Use as directed    Insulin Needles, Disposable, 31 gauge x 5/16\" ndle Dx code E11.65, use 6x daily    carvediloL (COREG) 12.5 mg tablet Take 1 Tablet by mouth two (2) times daily (with meals). tamsulosin (FLOMAX) 0.4 mg capsule Take 0.4 mg by mouth daily.         Yazan Castro MD  11/30/2022

## 2022-11-30 NOTE — PROCEDURES
Hemodialysis / [phone]    Vitals Pre Post Assessment Pre Post   BP BP: (!) 173/88 (11/30/22 1826)   163/73 LOC A/Ox4 No change   HR Pulse (Heart Rate): 81 (11/30/22 1826) 87 Lungs Diminished No change   Resp Resp Rate: 16 (11/30/22 1826) 16 Cardiac Steady RR No change   Temp Temp: 98 °F (36.7 °C) (11/30/22 1826) 98.6 Skin Warm/Dry No change   Weight    Edema Trace No change   Tele status Remote Remote Pain Pain Intensity 1: 5 (11/30/22 1741) 6     Orders   Duration: Start: 2932 End: 2141 Total: 3.25 hrs   Dialyzer: Dialyzer/Set Up Inspection: Verlon End (11/30/22 1826)   K Bath: Dialysate K (mEq/L): 3 (11/30/22 1826)   Ca Bath: Dialysate CA (mEq/L): 2.5 (11/30/22 1826)   Na: Dialysate NA (mEq/L): 138 (11/30/22 1826)   Bicarb: Dialysate HCO3 (mEq/L): 35 (11/30/22 1826)   Target Fluid Removal: Goal/Amount of Fluid to Remove (mL): 0 mL (11/30/22 1826)     Access   Type & Location: Rt Chest PC : Pre- Assessment: Dressing CDI. No s/s of infection. Both lumens aspirate & flush well. Running well at . Post assessment: Dressing CDI. No changes.     Comments:                                        Labs   HBsAg (Antigen) / date: Negative 11/16/2022                                              HBsAb (Antibody) / date: Susceptible 11/16/2022   Source: Epic   Obtained/Reviewed  Critical Results Called HGB   Date Value Ref Range Status   11/30/2022 7.2 (L) 12.1 - 17.0 g/dL Final     Potassium   Date Value Ref Range Status   11/30/2022 4.5 3.5 - 5.1 mmol/L Final     Comment:     INVESTIGATED PER DELTA CHECK PROTOCOL     Calcium   Date Value Ref Range Status   11/30/2022 6.9 (L) 8.5 - 10.1 MG/DL Final     BUN   Date Value Ref Range Status   11/30/2022 66 (H) 6 - 20 MG/DL Final     Creatinine   Date Value Ref Range Status   11/30/2022 5.57 (H) 0.70 - 1.30 MG/DL Final        Meds Given   Name Dose Route   None ordered                 Adequacy / Fluid    Total Liters Process: 73 L   Net Fluid Removed: 0 ml      Comments Time Out Done:   (Time) 1823   Admitting Diagnosis: DKA   Consent obtained/signed: Informed Consent Verified: Yes (11/30/22 1826)   Machine / RO # Machine Number: [id number] (11/30/22 1826)   Primary Nurse Rpt Pre: Kirsten Jiménez RN   Primary Nurse Rpt Post: Aroldo King RN   Pt Education: Procedural   Care Plan: Ongoing   Pts outpatient clinic: [facility]     Tx Summary   SBAR received from Primary RN. Pt arrived to HD suite A&Ox4. Consent signed & on file OR Chronic consent applies. 1826: Each catheter limb disinfected per p&p, caps removed, hubs disinfected per p&p. Each lumen aspirated for blood return and flushed with Normal Saline per policy. VSS. Dialysis Tx initiated. *Pt tolerated treatment well, sleeping quietly throughout. 2144: Tx ended. VSS. Each dialysis catheter limb disinfected per p&p, all possible blood returned per p&p, and each dialysis hub disinfected per p&p. Each lumen flushed, post dialysis catheter Heparin dwell instilled per order, and caps applied. Bed locked and in the lowest position, call bell and belongings in reach. SBAR given to Primary, RN. Patient is stable at time of their departure. All Dialysis related medications have been reviewed.

## 2022-11-30 NOTE — ED PROVIDER NOTES
EMERGENCY DEPARTMENT HISTORY AND PHYSICAL EXAM      Date: 11/29/2022  Patient Name: Lesa Edge    History of Presenting Illness     Chief Complaint   Patient presents with    Vomiting     Patient vomiting and lethargic x 2 days. Most recent emesis in triage appears bloody. Patient is pale and fatigued during triage. Given 15 units insulin pta for high bg. History Provided By: Patient and Patient's Mother    HPI: Lesa Edge, 38 y.o. male with past medical history significant for type 1 diabetes presents to the ED with cc of nausea, vomiting, lethargy for the past 2 days. Patient's mother states that his insulin prior to arrival read high, he was given 15 units of insulin. Patient's mother states he was recently admitted to [facility] for chest pain, he required intubation, mechanical ventilation, CRRT and pressors due to DKA and acute hypoxic respiratory failure. His course was complicated by an NSTEMI with a left heart cath without significant CAD. Endocrinology recommended continuing his insulin pump at discharge with recommendations for long-acting and mealtime insulin. Patient's mother states that patient missed his Friday and Monday dialysis as he was hospitalized and nobody picked him up on Monday. She states that he has been complaining of chest pain, lethargy and vomiting since his discharge. She states he has been vomiting and appears it is blood-tinged. History is somewhat limited, patient is lethargic and not answering questions. PCP: Libia Chau MD    No current facility-administered medications on file prior to encounter. Current Outpatient Medications on File Prior to Encounter   Medication Sig Dispense Refill    hydrALAZINE (APRESOLINE) 100 mg tablet Take 1 Tablet by mouth three (3) times daily. 90 Tablet 3    pregabalin (Lyrica) 75 mg capsule Take 1 Capsule by mouth nightly.  Max Daily Amount: 75 mg. 30 Capsule 2    insulin glargine (LANTUS) 100 unit/mL injection Take 10 units once daily at night 1 mL 2    insulin lispro (HUMALOG) 100 unit/mL injection INITIATE CORRECTIVE INSULIN PROTOCOL (FREDA): High Sensitivity (thin, ESRD): AC (before meals), Q6H CORRECTIONAL SCALE only For Blood Sugar (mg/dl) of :           140-199=0 units          200-249=2 units 250-299=3 units 300-349=4 units 350-400=6 units 401 or greater = Call MD Give in addition to basal medications. Do Not Hold for NPO BEDTIME CORRECTIONAL sliding scale when scheduled: 200-249=1 units 250-349=2 units 350-400=3 units 401 or greater = Call MD Give in addition to basal medications. Do Not Hold for NPO Fast Acting - Administer Immediately - or within 15 minutes of start of meal, if mealtime coverage. 1 Each 2    aspirin 81 mg chewable tablet Take 1 Tablet by mouth daily. 30 Tablet 0    lancets misc ACHS 1 Each 11    Blood-Glucose Meter monitoring kit ACHS 1 Kit 0    glucose blood VI test strips (blood glucose test) strip ACHS 200 Strip 0    Insulin Syringe-Needle U-100 1 mL 28 x 5/16\" syrg 1 Syringe by SubCUTAneous route Before breakfast, lunch, dinner and at bedtime. 200 Each 2    DULoxetine (CYMBALTA) 60 mg capsule Take 1 capsule by mouth once daily 30 Capsule 0    naloxone (Narcan) 4 mg/actuation nasal spray Use 1 spray intranasally, then discard. Repeat with new spray every 2 min as needed for opioid overdose symptoms, alternating nostrils. 1 Each 0    bacitracin zinc (BACITRACIN) ointment Apply  to affected area two (2) times a day. (Patient not taking: Reported on 11/2/2022) 28 g 4    Ketone Blood Test strp 50 Each by Does Not Apply route as needed for PRN Reason (Other) (as needed for suspected dka). 50 Strip 3    pantoprazole (PROTONIX) 40 mg tablet Take 1 Tablet by mouth Daily (before breakfast). 30 Tablet 0    finasteride (PROSCAR) 5 mg tablet Take 1 Tablet by mouth in the morning.  30 Tablet 2    Walker (Ultra-Light Rollator) misc Use while ambulating 1 Each 0    amLODIPine (NORVASC) 10 mg tablet Take 1 Tablet by mouth daily. 90 Tablet 1    cloNIDine (CATAPRES) 0.1 mg/24 hr ptwk 1 Patch by TransDERmal route every seven (7) days. 12 Patch 1    Dexcom G6  misc Use with the dexcom device to check blood sugars 4 times a day 1 Each 0    Dexcom G6 Sensor brandi Use as directed every 10 days 10 Each 11    Dexcom G6 Transmitter brandi Use as directed 10 Each 3    Insulin Needles, Disposable, 31 gauge x 5/16\" ndle Dx code E11.65, use 6x daily 200 Each 11    carvediloL (COREG) 12.5 mg tablet Take 1 Tablet by mouth two (2) times daily (with meals). 60 Tablet 1    tamsulosin (FLOMAX) 0.4 mg capsule Take 0.4 mg by mouth daily.          Past History     Past Medical History:  Past Medical History:   Diagnosis Date    Bipolar 1 disorder, depressed ([address]     Bipolar disorder ([address]     Chronic kidney disease, stage 3a ([address]     Depression     Diabetes ([address]     DKA, type 1 ([address] 1/27/2013    diagnosed age 23    DKA, type 1 ([address]     H/O noncompliance with medical treatment, presenting hazards to health     MRSA (methicillin resistant staph aureus) culture positive     MRSA (methicillin resistant Staphylococcus aureus)     Face    Noncompliance with medication regimen     Second hand smoke exposure     Seizure ([address]     Seizures ([address] 2006 or 2007    one episode during jail       Past Surgical History:  Past Surgical History:   Procedure Laterality Date    HX HEENT      top left wisdom tooth    HX ORTHOPAEDIC Left     wrist; MCV    IR INSERT NON TUNL CVC OVER 5 YRS  9/7/2022    IR INSERT TUNL CVC W/O PORT OVER 5 YR  9/9/2022    UPPER GI ENDOSCOPY,BIOPSY  11/20/2018            Family History:  Family History   Problem Relation Age of Onset    Diabetes Mother     Diabetes Other         neice, type 1        Social History:  Social History     Tobacco Use    Smoking status: Former     Packs/day: 0.10     Years: 16.00     Pack years: 1.60     Types: Cigarettes     Start date: 10/4/1999     Quit date: 2/25/2020     Years since quittin.7    Smokeless tobacco: Never   Vaping Use    Vaping Use: Never used   Substance Use Topics    Alcohol use: No    Drug use: No       Allergies:  No Known Allergies      Review of Systems   Review of Systems   Unable to perform ROS: Mental status change     Physical Exam   Physical Exam  Constitutional:       Appearance: He is ill-appearing. Comments: pale   HENT:      Head: Normocephalic and atraumatic. Nose: No congestion or rhinorrhea. Mouth/Throat:      Mouth: Mucous membranes are dry. Eyes:      Pupils: Pupils are equal, round, and reactive to light. Cardiovascular:      Rate and Rhythm: Normal rate and regular rhythm. Pulses: Normal pulses. Pulmonary:      Effort: Pulmonary effort is normal. No respiratory distress. Breath sounds: No wheezing. Abdominal:      General: Abdomen is flat. There is no distension. Palpations: Abdomen is soft. Musculoskeletal:         General: No swelling or deformity. Cervical back: Normal range of motion. Right lower leg: Edema present. Left lower leg: Edema present. Skin:     Capillary Refill: Capillary refill takes 2 to 3 seconds. Findings: Bruising present. Comments: Extensive bruising to the abdomen and bilateral arms   Neurological:      General: No focal deficit present. Mental Status: He is alert and oriented to person, place, and time.    Psychiatric:         Mood and Affect: Mood normal.       Diagnostic Study Results     Labs -     Recent Results (from the past 12 hour(s))   CBC WITH AUTOMATED DIFF    Collection Time: 22  6:55 PM   Result Value Ref Range    WBC 15.2 (H) 4.1 - 11.1 K/uL    RBC 3.39 (L) 4.10 - 5.70 M/uL    HGB 8.9 (L) 12.1 - 17.0 g/dL    HCT 28.3 (L) 36.6 - 50.3 %    MCV 83.5 80.0 - 99.0 FL    MCH 26.3 26.0 - 34.0 PG    MCHC 31.4 30.0 - 36.5 g/dL    RDW 18.7 (H) 11.5 - 14.5 %    PLATELET 576 822 - 612 K/uL    MPV 10.4 8.9 - 12.9 FL    NRBC 0.0 0  WBC    ABSOLUTE NRBC 0.00 0.00 - 0.01 K/uL    NEUTROPHILS 84 (H) 32 - 75 %    LYMPHOCYTES 9 (L) 12 - 49 %    MONOCYTES 5 5 - 13 %    EOSINOPHILS 0 0 - 7 %    BASOPHILS 0 0 - 1 %    IMMATURE GRANULOCYTES 2 (H) 0.0 - 0.5 %    ABS. NEUTROPHILS 12.9 (H) 1.8 - 8.0 K/UL    ABS. LYMPHOCYTES 1.3 0.8 - 3.5 K/UL    ABS. MONOCYTES 0.7 0.0 - 1.0 K/UL    ABS. EOSINOPHILS 0.0 0.0 - 0.4 K/UL    ABS. BASOPHILS 0.0 0.0 - 0.1 K/UL    ABS. IMM. GRANS. 0.3 (H) 0.00 - 0.04 K/UL    DF AUTOMATED     METABOLIC PANEL, COMPREHENSIVE    Collection Time: 11/29/22  6:55 PM   Result Value Ref Range    Sodium 115 (LL) 136 - 145 mmol/L    Potassium 4.0 3.5 - 5.1 mmol/L    Chloride 80 (L) 97 - 108 mmol/L    CO2 12 (LL) 21 - 32 mmol/L    Anion gap 23 (H) 5 - 15 mmol/L    Glucose 1,181 (HH) 65 - 100 mg/dL    BUN 65 (H) 6 - 20 MG/DL    Creatinine 6.11 (H) 0.70 - 1.30 MG/DL    BUN/Creatinine ratio 11 (L) 12 - 20      eGFR 11 (L) >60 ml/min/1.73m2    Calcium 7.8 (L) 8.5 - 10.1 MG/DL    Bilirubin, total 0.3 0.2 - 1.0 MG/DL    ALT (SGPT) 26 12 - 78 U/L    AST (SGOT) 17 15 - 37 U/L    Alk.  phosphatase 195 (H) 45 - 117 U/L    Protein, total 4.9 (L) 6.4 - 8.2 g/dL    Albumin 1.5 (L) 3.5 - 5.0 g/dL    Globulin 3.4 2.0 - 4.0 g/dL    A-G Ratio 0.4 (L) 1.1 - 2.2     NT-PRO BNP    Collection Time: 11/29/22  6:55 PM   Result Value Ref Range    NT pro-BNP 32,663 (H) <125 PG/ML   TROPONIN-HIGH SENSITIVITY    Collection Time: 11/29/22  6:55 PM   Result Value Ref Range    Troponin-High Sensitivity 188 (HH) 0 - 76 ng/L   LIPASE    Collection Time: 11/29/22  6:55 PM   Result Value Ref Range    Lipase 83 73 - 393 U/L   BLOOD GAS,CHEM8,LACTIC ACID POC    Collection Time: 11/29/22  7:03 PM   Result Value Ref Range    Calcium, ionized (POC) 1.09 (L) 1.12 - 1.32 mmol/L    BICARBONATE 11 mmol/L    Base deficit (POC) 16.3 mmol/L    Sample source VENOUS BLOOD      CO2, POC 10 (L) 19 - 24 MMOL/L    Sodium,  (LL) 136 - 145 MMOL/L    Potassium, POC 3.9 3.5 - 5.5 MMOL/L    Chloride, POC 86 (L) 100 - 108 MMOL/L    Glucose, POC >700 (HH) 74 - 106 MG/DL    Creatinine, POC 5.4 (H) 0.6 - 1.3 MG/DL    Lactic Acid (POC) 3.85 (HH) 0.40 - 2.00 mmol/L    Critical value read back CHAPARRITA     pH, venous (POC) 7.17 (LL) 7.32 - 7.42      pCO2, venous (POC) 29.2 (L) 41 - 51 MMHG    pO2, venous (POC) 29 25 - 40 mmHg   URINALYSIS W/ REFLEX CULTURE    Collection Time: 11/29/22  7:56 PM    Specimen: Urine   Result Value Ref Range    Color YELLOW/STRAW      Appearance CLEAR CLEAR      Specific gravity 1.022      pH (UA) 6.5 5.0 - 8.0      Protein 300 (A) NEG mg/dL    Glucose >1,000 (A) NEG mg/dL    Ketone 15 (A) NEG mg/dL    Bilirubin Negative NEG      Blood SMALL (A) NEG      Urobilinogen 0.2 0.2 - 1.0 EU/dL    Nitrites Negative NEG      Leukocyte Esterase Negative NEG      UA:UC IF INDICATED CULTURE NOT INDICATED BY UA RESULT      WBC 0-4 0 - 4 /hpf    RBC 0-5 0 - 5 /hpf    Epithelial cells FEW FEW /lpf    Bacteria Negative NEG /hpf    Hyaline cast 2-5 0 - 2 /lpf   METABOLIC PANEL, BASIC    Collection Time: 11/29/22  8:48 PM   Result Value Ref Range    Sodium 117 (LL) 136 - 145 mmol/L    Potassium 4.1 3.5 - 5.1 mmol/L    Chloride 83 (L) 97 - 108 mmol/L    CO2 14 (LL) 21 - 32 mmol/L    Anion gap 20 (H) 5 - 15 mmol/L    Glucose 1,013 (HH) 65 - 100 mg/dL    BUN 64 (H) 6 - 20 MG/DL    Creatinine 5.95 (H) 0.70 - 1.30 MG/DL    BUN/Creatinine ratio 11 (L) 12 - 20      eGFR 11 (L) >60 ml/min/1.73m2    Calcium 7.7 (L) 8.5 - 10.1 MG/DL   MAGNESIUM    Collection Time: 11/29/22  8:48 PM   Result Value Ref Range    Magnesium 2.0 1.6 - 2.4 mg/dL   PHOSPHORUS    Collection Time: 11/29/22  8:48 PM   Result Value Ref Range    Phosphorus 6.0 (H) 2.6 - 4.7 MG/DL   HEMOGLOBIN A1C WITH EAG    Collection Time: 11/29/22  8:48 PM   Result Value Ref Range    Hemoglobin A1c 10.1 (H) 4.0 - 5.6 %    Est. average glucose 243 mg/dL   EKG, 12 LEAD, INITIAL    Collection Time: 11/29/22  8:52 PM   Result Value Ref Range    Ventricular Rate 90 BPM Atrial Rate 90 BPM    P-R Interval 144 ms    QRS Duration 98 ms    Q-T Interval 390 ms    QTC Calculation (Bezet) 477 ms    Calculated P Axis 80 degrees    Calculated R Axis 91 degrees    Calculated T Axis 61 degrees    Diagnosis       Normal sinus rhythm  Rightward axis  Pulmonary disease pattern  When compared with ECG of 27-OCT-2022 02:58,  Vent.  rate has increased BY  39 BPM  Left anterior fascicular block is no longer present     GLUCOSE, POC    Collection Time: 11/29/22  9:01 PM   Result Value Ref Range    Glucose (POC) >600 (HH) 65 - 117 mg/dL    Performed by Ronnell Sauceda (ASAD)    POC G3 - PUL    Collection Time: 11/29/22  9:01 PM   Result Value Ref Range    pH (POC) 7.29 (L) 7.35 - 7.45      pCO2 (POC) 26.9 (L) 35.0 - 45.0 MMHG    pO2 (POC) 95 80 - 100 MMHG    HCO3 (POC) 13.0 (L) 22 - 26 MMOL/L    sO2 (POC) 96.6 92 - 97 %    Base deficit (POC) 12.3 mmol/L    Site LEFT RADIAL      Device: ROOM AIR      Allens test (POC) Positive      Specimen type (POC) ARTERIAL     GLUCOSTABILIZER    Collection Time: 11/29/22  9:38 PM   Result Value Ref Range    Glucose 601 mg/dL    Insulin order 10.8 units/hour    Insulin adminstered 10.8 units/hour    Multiplier 0.020     Low target 150 mg/dL    High target 200 mg/dL    D50 order 0.0 ml    D50 administered 0.00 ml    Minutes until next BG 60 min    Order initials CYH     Administered initials RP    GLUCOSE, POC    Collection Time: 11/29/22 10:39 PM   Result Value Ref Range    Glucose (POC) >600 (HH) 65 - 117 mg/dL    Performed by Ronnell Sauceda (ASAD)    GLUCOSTABILIZER    Collection Time: 11/29/22 10:40 PM   Result Value Ref Range    Glucose 601 mg/dL    Insulin order 16.2 units/hour    Insulin adminstered 16.2 units/hour    Multiplier 0.030     Low target 150 mg/dL    High target 200 mg/dL    D50 order 0.0 ml    D50 administered 0.00 ml    Minutes until next BG 60 min    Order initials CYH     Administered initials CYH    GLUCOSE, POC    Collection Time: 11/29/22 11:58 PM Result Value Ref Range    Glucose (POC) >600 (HH) 65 - 117 mg/dL    Performed by Severo Long (AS[facility])    GLUCOSTABILIZER    Collection Time: 11/29/22 11:59 PM   Result Value Ref Range    Glucose 601 mg/dL    Insulin order 21.6 units/hour    Insulin adminstered 21.6 units/hour    Multiplier 0.040     Low target 150 mg/dL    High target 200 mg/dL    D50 order 0.0 ml    D50 administered 0.00 ml    Minutes until next BG 60 min    Order initials Cyh     Administered initials SW        Radiologic Studies -   XR CHEST PORT   Final Result      Small bilateral pleural effusions with mild bibasilar atelectasis. CT Results  (Last 48 hours)      None          CXR Results  (Last 48 hours)                 11/29/22 1928  XR CHEST PORT Final result    Impression:      Small bilateral pleural effusions with mild bibasilar atelectasis. Narrative:  EXAM:  XR CHEST PORT       INDICATION: Vomiting       COMPARISON: 10/27/2022       TECHNIQUE: Portable chest AP view       FINDINGS: Right internal jugular tunneled dialysis catheter is stable in   position. The cardiac silhouette is within normal limits. There are small   bilateral pleural effusions with mild bibasilar atelectasis. The visualized   bones and upper abdomen are unremarkable. Medical Decision Making   I am the first provider for this patient. I reviewed the vital signs, available nursing notes, past medical history, past surgical history, family history and social history. Vital Signs-Reviewed the patient's vital signs.   Patient Vitals for the past 12 hrs:   Temp Pulse Resp BP SpO2   11/29/22 2300 97.8 °F (36.6 °C) 86 21 (!) 145/66 95 %   11/29/22 2045 -- 86 17 (!) 174/84 100 %   11/29/22 1945 98.1 °F (36.7 °C) 90 18 (!) 181/101 99 %   11/29/22 1915 -- 87 18 (!) 163/131 100 %   11/29/22 1750 -- 86 18 101/60 100 %       EKG interpretation: (Preliminary)  NSR, rate 90, WA: normal, QRS: normal, no siginficnat ST elevations or depressions. Records Reviewed: Nursing Notes, Old Medical Records, Previous Radiology Studies, and Previous Laboratory Studies    Provider Notes (Medical Decision Making):   -year-old male past medical history significant for type 1 diabetes presenting to the emergency department with chief complaint of nausea, vomiting, lethargy. Neil Tiff, he is hemodynamically stable, not hypoxic, answering questions appropriately but does appear lethargic. He is pale, edematous and unwell appearing labs were ordered including VBG, UA, CBC, BMP, troponin, lipase, chest x-ray, EKG. Patient initially started in the hallway and labs were not obtained until ultrasound IV access was placed by me. His initial VBG significant for significant acidemia with anion gap and blood glucose greater than 700 consistent with DKA. He appears somewhat fluid overloaded on exam, will give 1 L fluid bolus although peripherally he appears fluid overloaded, his mouth is dry and he is actively vomiting. Be cautious with fluid administration given his ESRD history and recent missed dialysis. ED Course:   Initial assessment performed. The patients presenting problems have been discussed, and they are in agreement with the care plan formulated and outlined with them. I have encouraged them to ask questions as they arise throughout their visit. ED Course as of 11/30/22 0029   Tue Nov 29, 2022 1911 Delay in obtaining labs, IV access as patient was in the hallway. US IV placed. [NM]      ED Course User Index  [NM] Ferny Morning, DO     Procedure Note- Peripheral IV access with Ultrasound Guidance  8:44 PM  Keara Spencer DO   gained IV access using  20 gauge needle because the patient had no vascular access. After cleaning the site with alcohol prep, the basilic vein was localized with ultrasound guidance in an anterior approach. Line confirmation was obtained by direct visualization and good blood return. No anaesthetic was used.   The line was successfully flushed with normal saline and was secured with transparent tape. The patient tolerated the procedure without complication. CMP significant for potassium of 4, glucose of 1100 corrected sodium within normal limits, anion gap of 23, creatinine 6 consistent with history of CKD, initial troponin 88, BNP 32,000 consisting with history of ESRD and missed dialysis. pH 7.17. Insulin drip ordered with cautious potassium replacement is given he is an ESRD patient. Discussed with ICU. ICU declined, discussed with hospitalist who will admit. Will engage renal for dialysis    I have spent 75 minutes of critical care time in evaluating and treating this patient. This includes time spent at bedside, time with family and decision makers, documentation, review of labs and imaging, and/or consultation with specialists. It does not include time spent on separately billed procedures. This patient presents with a critical illness or injury that acutely impairs one or more vital organ systems such that there is a high probability of imminent or life threatening deterioration in the patient's condition. This case involved decision making of high complexity to assess, manipulate, and support vital organ system failure and/or to prevent further life threatening deterioration of the patient's condition. Failure to initiate these interventions on an urgent basis would likely result in sudden, clinically significant or life threatening deterioration in the patient's condition. Abnormal findings supporting critical care: acidosis, hyperglycemia, anemia, fluid overload, ESRD  Interventions to support critical care: insulin gtt, ICU and hospitalist consultation, fluids  Failure to intervene may result in: shock, coma, death        Disposition:    Admitted to Floor Medical Floor the case was discussed with the admitting physician     Diagnosis     Clinical Impression:   1.  Diabetic ketoacidosis without coma associated with type 1 diabetes mellitus ([facility] [address]        Attestations:    Maci Ayers, DO        Please note that this dictation was completed with [employer], the computer voice recognition software. Quite often unanticipated grammatical, syntax, homophones, and other interpretive errors are inadvertently transcribed by the computer software. Please disregard these errors. Please excuse any errors that have escaped final proofreading. Thank you.

## 2022-11-30 NOTE — PROGRESS NOTES
Pharmacy Medication Reconciliation      The patient was interviewed regarding current PTA medication list, use and drug allergies. The patient was questioned regarding use of any other inhalers, topical products, over the counter medications, herbal medications, vitamin products or ophthalmic/nasal/otic medication use. Allergy Update: Patient has no known allergies. Recommendations/Findings: The following amendments were made to the patient's active medication list on file at [facility]:   1) Additions: Sevelamer 800 mg, Rosuvastatin 10 mg      2) Deletions: Bacitracin ointment      3) Changes: None        Pertinent Findings: None        Prior to Admission Medications   Prescriptions Last Dose Informant Taking? Blood-Glucose Meter monitoring kit     No   Sig: ACHS   DULoxetine (CYMBALTA) 60 mg capsule     No   Sig: Take 1 capsule by mouth once daily   Dexcom G6  misc     No   Sig: Use with the dexcom device to check blood sugars 4 times a day   Dexcom G6 Sensor brandi     No   Sig: Use as directed every 10 days   Dexcom G6 Transmitter brandi     No   Sig: Use as directed   Insulin Needles, Disposable, 31 gauge x 5/16\" ndle     No   Sig: Dx code E11.65, use 6x daily   Insulin Syringe-Needle U-100 1 mL 28 x 5/16\" syrg     No   Si Syringe by SubCUTAneous route Before breakfast, lunch, dinner and at bedtime. Ketone Blood Test strp     No   Si Each by Does Not Apply route as needed for PRN Reason (Other) (as needed for suspected dka). Walker (Ultra-Light Rollator) misc     No   Sig: Use while ambulating   amLODIPine (NORVASC) 10 mg tablet     No   Sig: Take 1 Tablet by mouth daily. aspirin 81 mg chewable tablet     No   Sig: Take 1 Tablet by mouth daily. carvediloL (COREG) 12.5 mg tablet     No   Sig: Take 1 Tablet by mouth two (2) times daily (with meals). finasteride (PROSCAR) 5 mg tablet     No   Sig: Take 1 Tablet by mouth in the morning.    glucose blood VI test strips (blood glucose test) strip     No   Sig: ACHS   hydrALAZINE (APRESOLINE) 100 mg tablet     No   Sig: Take 1 Tablet by mouth three (3) times daily. insulin glargine (LANTUS) 100 unit/mL injection     No   Sig: Take 10 units once daily at night   insulin lispro (HUMALOG) 100 unit/mL injection     No   Sig: INITIATE CORRECTIVE INSULIN PROTOCOL (FREDA): High Sensitivity (thin, ESRD): AC (before meals), Q6H CORRECTIONAL SCALE only For Blood Sugar (mg/dl) of :           140-199=0 units          200-249=2 units 250-299=3 units 300-349=4 units 350-400=6 units 401 or greater = Call MD Give in addition to basal medications. Do Not Hold for NPO BEDTIME CORRECTIONAL sliding scale when scheduled: 200-249=1 units 250-349=2 units 350-400=3 units 401 or greater = Call MD Give in addition to basal medications. Do Not Hold for NPO Fast Acting - Administer Immediately - or within 15 minutes of start of meal, if mealtime coverage. lancets misc     No   Sig: ACHS   naloxone (Narcan) 4 mg/actuation nasal spray     No   Sig: Use 1 spray intranasally, then discard. Repeat with new spray every 2 min as needed for opioid overdose symptoms, alternating nostrils. pantoprazole (PROTONIX) 40 mg tablet     No   Sig: Take 1 Tablet by mouth Daily (before breakfast). pregabalin (Lyrica) 75 mg capsule     No   Sig: Take 1 Capsule by mouth nightly. Max Daily Amount: 75 mg.   rosuvastatin (CRESTOR) 10 mg tablet     Yes   Sig: Take 10 mg by mouth nightly. sevelamer (RENAGEL) 800 mg tablet     Yes   Sig: Take  by mouth three (3) times daily (with meals). tamsulosin (FLOMAX) 0.4 mg capsule     No   Sig: Take 0.4 mg by mouth daily.       Facility-Administered Medications: None         Thank you,  Yas Guido

## 2022-11-30 NOTE — H&P
PLEASE NOTE: I HAVE GENERATED THIS NOTE WITH THE ASSISTANCE OF VOICE-RECOGNITION TECHNOLOGY. PLEASE EXCUSE ANY SPELLING, GRAMMATICAL, AND SYNTAX ERRORS YOU MAY FIND. IF YOU NEED CLARIFICATION ON ANYTHING, PLEASE FEEL FREE TO REACH OUT TO ME.  Umer Rodriguez [facility]  Hospitalist Group  History and Physical - Dr. Joselin Evans:     38 y.o. male with pertinent medical hx of DM presented with nausea and vomiting, found to be in DKA    Differential diagnosis, explanation and analysis: Given patient's anion gap, elevated sugar, and presenting sx of n/v, it is obvious that patient is in DKA. He is also an ESRD HD patient, but I believe that this patient does not need urgent HD, rather he needs his acidosis treated with insulin gtt    Active Problems:    DKA (diabetic ketoacidosis) ([address] (12/2/2021)        Plan:     Acute problems:    Acute Metabolic Acidosis, likely 2/2 DKA  - Bicarb gtt  - Insulin gtt  - Will recheck BMP in 4 hours  - Will recheck ABG in 2 hours  - If acidosis is not better with bicarb gtt and with insulin gtt, will consult nephro stat  - K+ was 4 on admission, will add K+ to fluids    Chronic Problems:    HTN   - Will c/w home Norvasc, Clonidine, Hydralazine    CAD  - Will continue with home asa, coreg    DM Neuropathy  - C/w home Cymbalta    BPH  - C/w home proscar, tamsulosin    ESRD on HD  - Nephro consult in AM; if patient gets worse, will call overnight. If code status was discussed with the patient, then code status entered as per the orders: Full                                                                            Subjective:   Primary Care Provider: Grisel Starkey MD  Date of Service:  See Date Note Was Originated  Chief Complaint: Nausea    38 y.o. male presents with 2-3 days' duration of gradual onset, gradually worsening, severe, constant, nausea that is associated with vomiting, remitted by nothing, exacerbated by nothing.     Further history: of note, patient is an ESRD on HD patient - has not been to HD since F, missed his M dose. Pertinent chart review: Patient was just admitted for DKA on 11/20/2022    Review of Systems:  12 point ROS obtained and otherwise negative, except as per HPI and above. Past Medical History:   Diagnosis Date    Bipolar 1 disorder, depressed ([address]     Bipolar disorder ([address]     Chronic kidney disease, stage 3a ([address]     Depression     Diabetes ([address]     DKA, type 1 ([address] 1/27/2013    diagnosed age 23    DKA, type 1 ([address]     H/O noncompliance with medical treatment, presenting hazards to health     MRSA (methicillin resistant staph aureus) culture positive     MRSA (methicillin resistant Staphylococcus aureus)     Face    Noncompliance with medication regimen     Second hand smoke exposure     Seizure ([address]     Seizures ([facility] 2006 or 2007    one episode during senior living      Past Surgical History:   Procedure Laterality Date    HX HEENT      top left wisdom tooth    HX ORTHOPAEDIC Left     wrist; MCV    IR INSERT NON TUNL CVC OVER 5 YRS  9/7/2022    IR INSERT TUNL CVC W/O PORT OVER 5 YR  9/9/2022    UPPER GI ENDOSCOPY,BIOPSY  11/20/2018          Prior to Admission medications    Medication Sig Start Date End Date Taking? Authorizing Provider   hydrALAZINE (APRESOLINE) 100 mg tablet Take 1 Tablet by mouth three (3) times daily. 11/2/22   Savanna Weston MD   pregabalin (Lyrica) 75 mg capsule Take 1 Capsule by mouth nightly.  Max Daily Amount: 75 mg. 11/2/22   Savanna Weston MD   insulin glargine (LANTUS) 100 unit/mL injection Take 10 units once daily at night 10/28/22   Liza Kaye MD   insulin lispro (HUMALOG) 100 unit/mL injection INITIATE CORRECTIVE INSULIN PROTOCOL (FREDA): High Sensitivity (thin, ESRD): AC (before meals), Q6H CORRECTIONAL SCALE only For Blood Sugar (mg/dl) of :           140-199=0 units          200-249=2 units 250-299=3 units 300-349=4 units 350-400=6 units 401 or greater = Call MD Give in addition to basal medications. Do Not Hold for NPO BEDTIME CORRECTIONAL sliding scale when scheduled: 200-249=1 units 250-349=2 units 350-400=3 units 401 or greater = Call MD Give in addition to basal medications. Do Not Hold for NPO Fast Acting - Administer Immediately - or within 15 minutes of start of meal, if mealtime coverage. 10/28/22   Venice Resendez MD   aspirin 81 mg chewable tablet Take 1 Tablet by mouth daily. 10/29/22   Venice Resendez MD   [facility] 10/28/22   Venice Resendez MD   Blood-Glucose Meter monitoring kit [facility] 10/28/22   Venice Resendez MD   glucose blood VI test strips (blood glucose test) strip [facility] 10/28/22   Venice Resendez MD   Insulin Syringe-Needle U-100 1 mL 28 x 5/16\" syrg 1 Syringe by SubCUTAneous route Before breakfast, lunch, dinner and at bedtime. 10/28/22   Venice Resendez MD   DULoxetine (CYMBALTA) 60 mg capsule Take 1 capsule by mouth once daily 10/6/22   Merle Sena MD   naloxone (Narcan) 4 mg/actuation nasal spray Use 1 spray intranasally, then discard. Repeat with new spray every 2 min as needed for opioid overdose symptoms, alternating nostrils. 9/13/22   Merle Sena MD   bacitracin zinc (BACITRACIN) ointment Apply  to affected area two (2) times a day. Patient not taking: Reported on 11/2/2022 8/25/22   Merle Sena MD   Ketone Blood Test strp 50 Each by Does Not Apply route as needed for PRN Reason (Other) (as needed for suspected dka). 8/23/22   Subhash Granados MD   pantoprazole (PROTONIX) 40 mg tablet Take 1 Tablet by mouth Daily (before breakfast). 8/21/22   Lilliana Fortune MD   finasteride (PROSCAR) 5 mg tablet Take 1 Tablet by mouth in the morning. 7/25/22   MD Betty Jackson (Ultra-Light Rollator) misc Use while ambulating 7/14/22   Merle Sena MD   amLODIPine (NORVASC) 10 mg tablet Take 1 Tablet by mouth daily.  7/14/22   Delray Plane MD SORAIDA   cloNIDine (CATAPRES) 0.1 mg/24 hr ptwk 1 Patch by TransDERmal route every seven (7) days. 22   Pineda Alonso MD   Dexcom G6  misc Use with the dexcom device to check blood sugars 4 times a day 22   Carol Wadsworth MD   Dexcom G6 Sensor brandi Use as directed every 10 days 22   Carol Wadsworth MD   Dexcom G6 Transmitter brandi Use as directed 22   Carol Wadsworth MD   Insulin Needles, Disposable, 31 gauge x 16\" ndle Dx code E11.65, use 6x daily 22   Carol Wadsworth MD   carvediloL (COREG) 12.5 mg tablet Take 1 Tablet by mouth two (2) times daily (with meals). 22   Pineda Alonso MD   tamsulosin (FLOMAX) 0.4 mg capsule Take 0.4 mg by mouth daily.     Provider, Historical     No Known Allergies   Family History   Problem Relation Age of Onset    Diabetes Mother     Diabetes Other         neice, type 1        Social History     Socioeconomic History    Marital status: SINGLE   Tobacco Use    Smoking status: Former     Packs/day: 0.10     Years: 16.00     Pack years: 1.60     Types: Cigarettes     Start date: 10/4/1999     Quit date: 2020     Years since quittin.7    Smokeless tobacco: Never   Vaping Use    Vaping Use: Never used   Substance and Sexual Activity    Alcohol use: No    Drug use: No    Sexual activity: Not Currently   Social History Narrative    ** Merged History Encounter **          Social Determinants of Health     Financial Resource Strain: High Risk    Difficulty of Paying Living Expenses: Very hard   Food Insecurity: Food Insecurity Present    Worried About Running Out of Food in the Last Year: Often true    Ran Out of Food in the Last Year: Often true      Objective:   Physical Exam:     VS as below    Const'l:          Normal body habitus, a&o, no acute distress  Head/Neck:       no cervical/head mass  Eyes:     nonicteric sclera, eom intact  ENT:      auditory acuity grossly intact either with or without device, no nasal deformity  Cardio: Reg rate reg rhythm  Pulm:     no accessory muscle use  Abd:       sntnd   Derm:     no rashes, no ulcers, no lesions  Extr:      No edema, no cyanosis, no calf tenderness, no varicosities  Neuro:    cn II-XII intact  Psych:   mood intact, judgement intact    Data Review: All diagnostic labs and studies have been reviewed.

## 2022-11-30 NOTE — CONSULTS
[facility] INITIAL ASSESSMENT. Name: Rupesh Bowden   : 1980   MRN: [id number]   Date: 2022        Chief Complaint   Patient presents with    Vomiting     Patient vomiting and lethargic x 2 days. Most recent emesis in triage appears bloody. Patient is pale and fatigued during triage. Given 15 units insulin pta for high bg. HPI:     39 y/o M pt with pmh of ESRD on HD and DM presented with n/v.    Upon arrival to ED [facility] pt HDS, SPO2 98% on RA. Labs significant for DKA (glucose 1100, pH 7.17, bicarb 12, AG 23, + ketones in urine). Started on insulin gtt, ICU consulted for dispo. Upon my arrival pt alert, HDS, RR normal and SPO2 99% on RA. C/o nausea. Stated he missed HD on Monday, last HD session Friday. Pt appears to be stable with normal mentation and work of breathing. At this time he does not appear to have any critical care needs. He is appropriate for step down level of care. Discussed with hospitalist and ED physician. Active Problems Being Managed:     DKA   ESRD on HD    Assessment/Plan:     DKA   -pH 7.17, bicarb 12, AG 23, glucose 1100  -insulin gtt  -gentle hydration due to ESRD  -when BG<250 switch IVF to D51/2NS  -BMP Q 4 hr   -once AG <12 and BG<200 transition to lantus and insulin sliding scale    ESRD on HD  -would consult nephrology for HD given missed HD session  -monitor electrolytes (K WNL on last BMP)  -renally dose meds    ** rest of medical plan as per primary team.  Please re-consult ICU if pt's medical condition deteriorates**    DVT prophylaxis: sub Q hep  SUP: protonix  Code status: Full    Next of kin: Rikki Leger (mother)    I personally spent 45 minutes of critical care time. This is time spent at this critically ill patient's bedside actively involved in patient care as well as the coordination of care and discussions with the patient's family.   This does not include any procedural time which has been billed separately. Review of systems:     Review of Systems   Constitutional:  Negative for chills and fever. HENT:  Negative for congestion. Eyes:  Negative for blurred vision. Respiratory:  Negative for cough, sputum production and shortness of breath. Cardiovascular:  Negative for chest pain and palpitations. Gastrointestinal:  Positive for nausea and vomiting. Negative for abdominal pain and diarrhea. Genitourinary:  Negative for dysuria. Musculoskeletal:  Negative for myalgias. Neurological:  Negative for dizziness and headaches. Objective:     Vital Signs:  Visit Vitals  BP (!) 174/84   Pulse 86   Temp 98.1 °F (36.7 °C)   Resp 17   Ht 5' 8\" (1.727 m)   Wt 77.1 kg (169 lb 15.6 oz)   SpO2 100%   BMI 25.84 kg/m²        Temp (24hrs), Av.1 °F (36.7 °C), Min:98.1 °F (36.7 °C), Max:98.1 °F (36.7 °C)           Intake/Output:   No intake or output data in the 24 hours ending 22 2595    Physical Exam:  Physical Exam  HENT:      Head: Normocephalic. Nose: Nose normal.      Mouth/Throat:      Mouth: Mucous membranes are moist.   Eyes:      Conjunctiva/sclera: Conjunctivae normal.   Cardiovascular:      Rate and Rhythm: Normal rate and regular rhythm. Pulses: Normal pulses. Heart sounds: Normal heart sounds. Pulmonary:      Effort: Pulmonary effort is normal.      Breath sounds: Normal breath sounds. Abdominal:      General: There is no distension. Palpations: Abdomen is soft. Tenderness: There is no abdominal tenderness. Musculoskeletal:         General: Normal range of motion. Cervical back: Normal range of motion. Skin:     General: Skin is warm. Capillary Refill: Capillary refill takes less than 2 seconds. Neurological:      General: No focal deficit present. Mental Status: He is alert.        Past Medical History:        has a past medical history of Bipolar 1 disorder, depressed ([address] Bipolar disorder ([address] Chronic kidney disease, stage 3a ([facility] [address] Depression, Diabetes ([facility] DKA, type 1 ([facility] (1/27/2013), DKA, type 1 ([facility] H/O noncompliance with medical treatment, presenting hazards to health, MRSA (methicillin resistant staph aureus) culture positive, MRSA (methicillin resistant Staphylococcus aureus), Noncompliance with medication regimen, Second hand smoke exposure, Seizure ([facility] [address] and Seizures ([facility] [address] (2006 or 2007). Past Surgical History:      has a past surgical history that includes hx orthopaedic (Left); hx heent; upper gi endoscopy,biopsy (11/20/2018); ir insert non tunl cvc over 5 yrs (9/7/2022); and ir insert tunl cvc w/o port over 5 yr (9/9/2022). Home Medications:     Prior to Admission medications    Medication Sig Start Date End Date Taking? Authorizing Provider   hydrALAZINE (APRESOLINE) 100 mg tablet Take 1 Tablet by mouth three (3) times daily. 11/2/22   Nina Ventura MD   pregabalin (Lyrica) 75 mg capsule Take 1 Capsule by mouth nightly. Max Daily Amount: 75 mg. 11/2/22   Nina Ventura MD   insulin glargine (LANTUS) 100 unit/mL injection Take 10 units once daily at night 10/28/22   Cedrick Rollins MD   insulin lispro (HUMALOG) 100 unit/mL injection INITIATE CORRECTIVE INSULIN PROTOCOL (FREDA): High Sensitivity (thin, ESRD): AC (before meals), Q6H CORRECTIONAL SCALE only For Blood Sugar (mg/dl) of :           140-199=0 units          200-249=2 units 250-299=3 units 300-349=4 units 350-400=6 units 401 or greater = Call MD Give in addition to basal medications. Do Not Hold for NPO BEDTIME CORRECTIONAL sliding scale when scheduled: 200-249=1 units 250-349=2 units 350-400=3 units 401 or greater = Call MD Give in addition to basal medications. Do Not Hold for NPO Fast Acting - Administer Immediately - or within 15 minutes of start of meal, if mealtime coverage. 10/28/22   Cedrick Rollins MD   aspirin 81 mg chewable tablet Take 1 Tablet by mouth daily.  10/29/22   Belinda[address] Jayden Elias, MD   lancets misc [facility] 10/28/22   Segun Smith MD   Blood-Glucose Meter monitoring kit [facility] 10/28/22   Segun Smith MD   glucose blood VI test strips (blood glucose test) strip [facility] 10/28/22   Segun Smith MD   Insulin Syringe-Needle U-100 1 mL 28 x 5/16\" syrg 1 Syringe by SubCUTAneous route Before breakfast, lunch, dinner and at bedtime. 10/28/22   Segun Smith MD   DULoxetine (CYMBALTA) 60 mg capsule Take 1 capsule by mouth once daily 10/6/22   Kwabena Jett MD   naloxone (Narcan) 4 mg/actuation nasal spray Use 1 spray intranasally, then discard. Repeat with new spray every 2 min as needed for opioid overdose symptoms, alternating nostrils. 9/13/22   Kwabena Jett MD   bacitracin zinc (BACITRACIN) ointment Apply  to affected area two (2) times a day. Patient not taking: Reported on 11/2/2022 8/25/22   Kwabena Jett MD   Ketone Blood Test strp 50 Each by Does Not Apply route as needed for PRN Reason (Other) (as needed for suspected dka). 8/23/22   Odalis Aguilar MD   pantoprazole (PROTONIX) 40 mg tablet Take 1 Tablet by mouth Daily (before breakfast). 8/21/22   Isadora Smith MD   finasteride (PROSCAR) 5 mg tablet Take 1 Tablet by mouth in the morning. 7/25/22   MD Gómez Clay (Ultra-Light Rollator) misc Use while ambulating 7/14/22   Kwabena Jett MD   amLODIPine (NORVASC) 10 mg tablet Take 1 Tablet by mouth daily. 7/14/22   Kwabena Jett MD   cloNIDine (CATAPRES) 0.1 mg/24 hr ptwk 1 Patch by TransDERmal route every seven (7) days.  7/14/22   Kwabena Jett MD   Dexcom G6  misc Use with the dexcom device to check blood sugars 4 times a day 7/1/22   Odalis Aguilar MD   Dexcom G6 Sensor brandi Use as directed every 10 days 6/21/22   Odalis Aguilar MD   Dexcom G6 Transmitter brandi Use as directed 6/21/22   Odalis Aguilar MD   Insulin Needles, Disposable, 31 gauge x 5/16\" ndle Dx code E11.65, use 6x daily 22   Vernon Sanchez MD   carvediloL (COREG) 12.5 mg tablet Take 1 Tablet by mouth two (2) times daily (with meals). 22   Malachi Pope MD   tamsulosin (FLOMAX) 0.4 mg capsule Take 0.4 mg by mouth daily. Provider, Historical         Allergies/Social/Family History:     No Known Allergies   Social History     Tobacco Use    Smoking status: Former     Packs/day: 0.10     Years: 16.00     Pack years: 1.60     Types: Cigarettes     Start date: 10/4/1999     Quit date: 2020     Years since quittin.7    Smokeless tobacco: Never   Substance Use Topics    Alcohol use: No      Family History   Problem Relation Age of Onset    Diabetes Mother     Diabetes Other         neice, type 1          LABS AND  DATA:   Reviewed      Peak airway pressure:      Minute ventilation:        CRITICAL CARE CONSULTANT NOTE  I had a face to face encounter with the patient, reviewed and interpreted patient data including clinical events, labs, images, vital signs, I/O's, and examined patient. I have discussed the case and the plan and management of the patient's care with the consulting services, the bedside nurses and the respiratory therapist.      NOTE OF PERSONAL INVOLVEMENT IN CARE   This patient has a high probability of imminent, clinically significant deterioration, which requires the highest level of preparedness to intervene urgently. I participated in the decision-making and personally managed or directed the management of the following life and organ supporting interventions that required my frequent assessment to treat or prevent imminent deterioration.         Nain Mike NP   Pulmonary/CCM  [address]  [phone]  2022

## 2022-11-30 NOTE — PROGRESS NOTES
Hospitalist Progress Note    NAME: Janiya Razo   :  1980   MRN:  [id number]       Assessment / Plan:    DKA  Patient recently admitted to ICU on  at [facility] with a DKA and respiratory failure requiring intubation and mechanical ventilation, CRRT and pressors  Was initially on insulin drip  Anion gap closed bicarb normal  Lantus 8 unit uptitrate as tolerated   we will discontinue insulin drip  Diabetic management consult diabetic diet  Continue SSI    Off note-Patient was on insulin pump  before which was discontinued on previous hospitalization. Recommended Lantus 10 unit daily with aspart 3 units with meals and SSI on discharge    Anemia  Hemoglobin trended down to 7.2  Baseline hemoglobin around 11  Will change to pantoprazole 40 IV twice daily  Occult blood  Gi consult if hb trends down  (Per discharge summary hemoglobin was around 7.4-7.6 at [facility])            ESRD on hemodialysis  Patient missed dialysis  Nephrology consulted  Will be dialyzed today  Continue MWF schedule  On sevelamer at home    Chest pain  Less likely ACS  Troponin slightly trended up to 200  Standing and lifting  Patient had recent LHC early November not showing any significant CAD        HTN   - Will c/w home Norvasc, Coreg hydralazine     CAD  - Will continue with home asa, coreg     DM Neuropathy  - C/w home Cymbalta     BPH  - C/w home proscar, tamsulosin     ESRD on HD  - Nephro consult in AM; if patient gets worse, will call overnight. If code status was discussed with the patient, then code status entered as per the orders: Full    25.0 - 29.9 Overweight / Body mass index is 25.84 kg/m². Estimated discharge date:   Barriers:    Code status: Full  Prophylaxis: scd  Recommended Disposition: Home w/Family     Subjective:     Chief Complaint / Reason for Physician Visit  \" Complaining of stabbing end of chest pain which happens sometimes at home denies any nausea vomiting. Patient already on pantoprazole. [facility]\". Discussed with RN events overnight. Review of Systems: All pertinent positive as above all other systems negative  Symptom Y/N Comments  Symptom Y/N Comments   Fever/Chills    Chest Pain     Poor Appetite    Edema     Cough    Abdominal Pain     Sputum    Joint Pain     SOB/J[place]ST[place]    Pruritis/Rash     Nausea/vomit    Tolerating PT/OT     Diarrhea    Tolerating Diet     Constipation    Other       Could NOT obtain due to:      Objective:     VITALS:   Last 24hrs VS reviewed since prior progress note. Most recent are:  Patient Vitals for the past 24 hrs:   Temp Pulse Resp BP SpO2   11/30/22 1402 -- -- -- -- 91 %   11/30/22 1350 98.5 °F (36.9 °C) 78 11 127/67 (!) 89 %   11/30/22 1245 -- 78 17 (!) 110/56 --   11/30/22 1108 -- -- -- 129/83 --   11/30/22 1103 -- 79 -- 113/66 100 %   11/30/22 1030 -- 83 21 (!) 149/68 95 %   11/30/22 0930 -- 85 22 (!) 155/97 100 %   11/30/22 0830 98.2 °F (36.8 °C) 81 16 130/67 91 %   11/30/22 0730 -- 88 18 125/67 96 %   11/30/22 0715 -- 81 16 104/66 97 %   11/30/22 0545 -- 85 17 (!) 151/71 99 %   11/30/22 0530 -- 84 14 138/71 97 %   11/30/22 0415 -- 86 26 126/69 99 %   11/30/22 0345 -- 87 (!) 0 137/74 97 %   11/29/22 2300 97.8 °F (36.6 °C) 86 21 (!) 145/66 95 %   11/29/22 2045 -- 86 17 (!) 174/84 100 %   11/29/22 1945 98.1 °F (36.7 °C) 90 18 (!) 181/101 99 %   11/29/22 1915 -- 87 18 (!) 163/131 100 %   11/29/22 1750 -- 86 18 101/60 100 %       Intake/Output Summary (Last 24 hours) at 11/30/2022 1552  Last data filed at 11/30/2022 1319  Gross per 24 hour   Intake 1916.67 ml   Output 500 ml   Net 1416.67 ml        I had a face to face encounter and independently examined this patient on 11/30/2022, as outlined below:  PHYSICAL EXAM:  General: WD, WN. Alert, cooperative, no acute distress    EENT:  EOMI. Anicteric sclerae. MMM  Resp:  CTA bilaterally, no wheezing or rales.   No accessory muscle use  CV:  Regular  rhythm,  No edema  GI:  Soft, Non distended, Non tender. +Bowel sounds  Neurologic:  Alert and oriented X 3, normal speech,   Psych:   Good insight. Not anxious nor agitated  Skin:  No rashes. No jaundice    Reviewed most current lab test results and cultures  YES  Reviewed most current radiology test results   YES  Review and summation of old records today    NO  Reviewed patient's current orders and MAR    YES  PMH/SH reviewed - no change compared to H&P  ________________________________________________________________________  Care Plan discussed with:    Comments   Patient x    Family      RN x    Care Manager     Consultant                        Multidiciplinary team rounds were held today with , nursing, pharmacist and clinical coordinator. Patient's plan of care was discussed; medications were reviewed and discharge planning was addressed. ________________________________________________________________________  Total NON critical care TIME:  45   Minutes    Total CRITICAL CARE TIME Spent:   Minutes non procedure based      Comments   >50% of visit spent in counseling and coordination of care x    ________________________________________________________________________  Angela Oswald MD     Procedures: see electronic medical records for all procedures/Xrays and details which were not copied into this note but were reviewed prior to creation of Plan. LABS:  I reviewed today's most current labs and imaging studies.   Pertinent labs include:  Recent Labs     11/30/22  0211 11/29/22  1855   WBC 11.7* 15.2*   HGB 7.2* 8.9*   HCT 21.2* 28.3*    392     Recent Labs     11/30/22  1304 11/30/22  0211 11/29/22 2048 11/29/22  1855   * 124* 117* 115*   K 4.5 3.5 4.1 4.0   CL 98 92* 83* 80*   CO2 24 21 14* 12*   * 560* 1,013* 1,181*   BUN 66* 65* 64* 65*   CREA 5.57* 5.83* 5.95* 6.11*   CA 6.9* 7.4* 7.7* 7.8*   MG 1.7 1.7 2.0  --    PHOS  --   --  6.0*  --    ALB  --  1.2*  --  1.5*   TBILI  --  0.4  --  0.3   ALT  --  19  -- 26       Signed: Uriel Jimenez MD

## 2022-12-01 ENCOUNTER — HOME HEALTH ADMISSION (OUTPATIENT)
Dept: HOME HEALTH SERVICES | Facility: HOME HEALTH | Age: 42
End: 2022-12-01
Payer: MEDICAID

## 2022-12-01 ENCOUNTER — DOCUMENTATION ONLY (OUTPATIENT)
Dept: ENDOCRINOLOGY | Age: 42
End: 2022-12-01

## 2022-12-01 PROBLEM — E16.2 HYPOGLYCEMIA: Status: RESOLVED | Noted: 2022-10-27 | Resolved: 2022-12-01

## 2022-12-01 PROBLEM — E11.65 HYPERGLYCEMIA DUE TO DIABETES MELLITUS (HCC): Status: RESOLVED | Noted: 2022-01-01 | Resolved: 2022-01-01

## 2022-12-01 PROBLEM — E11.10 DKA (DIABETIC KETOACIDOSES): Status: RESOLVED | Noted: 2019-07-06 | Resolved: 2022-01-01

## 2022-12-01 PROBLEM — E10.10 DKA, TYPE 1 (HCC): Status: RESOLVED | Noted: 2018-11-18 | Resolved: 2022-01-01

## 2022-12-01 PROBLEM — E11.65 HYPERGLYCEMIA DUE TO DIABETES MELLITUS (HCC): Status: RESOLVED | Noted: 2022-07-10 | Resolved: 2022-12-01

## 2022-12-01 PROBLEM — E11.10 DKA (DIABETIC KETOACIDOSIS) (HCC): Status: RESOLVED | Noted: 2021-12-02 | Resolved: 2022-12-01

## 2022-12-01 PROBLEM — E16.2 HYPOGLYCEMIA: Status: RESOLVED | Noted: 2022-01-01 | Resolved: 2022-01-01

## 2022-12-01 PROBLEM — E11.10 DKA (DIABETIC KETOACIDOSIS) (HCC): Status: RESOLVED | Noted: 2021-12-02 | Resolved: 2022-01-01

## 2022-12-01 PROBLEM — E11.10 DKA (DIABETIC KETOACIDOSES): Status: RESOLVED | Noted: 2019-07-06 | Resolved: 2022-12-01

## 2022-12-01 PROBLEM — E10.10 DKA, TYPE 1 (HCC): Status: RESOLVED | Noted: 2018-11-18 | Resolved: 2022-12-01

## 2022-12-01 LAB
ANION GAP SERPL CALC-SCNC: 5 MMOL/L (ref 5–15)
BASOPHILS # BLD: 0 K/UL (ref 0–0.1)
BASOPHILS NFR BLD: 0 % (ref 0–1)
BUN SERPL-MCNC: 27 MG/DL (ref 6–20)
BUN/CREAT SERPL: 9 (ref 12–20)
CALCIUM SERPL-MCNC: 7.4 MG/DL (ref 8.5–10.1)
CHLORIDE SERPL-SCNC: 100 MMOL/L (ref 97–108)
CO2 SERPL-SCNC: 29 MMOL/L (ref 21–32)
CREAT SERPL-MCNC: 3.12 MG/DL (ref 0.7–1.3)
DIFFERENTIAL METHOD BLD: ABNORMAL
EOSINOPHIL # BLD: 0.1 K/UL (ref 0–0.4)
EOSINOPHIL NFR BLD: 1 % (ref 0–7)
ERYTHROCYTE [DISTWIDTH] IN BLOOD BY AUTOMATED COUNT: 20.3 % (ref 11.5–14.5)
GLUCOSE BLD STRIP.AUTO-MCNC: 125 MG/DL (ref 65–117)
GLUCOSE BLD STRIP.AUTO-MCNC: 127 MG/DL (ref 65–117)
GLUCOSE BLD STRIP.AUTO-MCNC: 170 MG/DL (ref 65–117)
GLUCOSE BLD STRIP.AUTO-MCNC: 224 MG/DL (ref 65–117)
GLUCOSE SERPL-MCNC: 137 MG/DL (ref 65–100)
HCT VFR BLD AUTO: 19.7 % (ref 36.6–50.3)
HCT VFR BLD AUTO: 26.6 % (ref 36.6–50.3)
HGB BLD-MCNC: 6.7 G/DL (ref 12.1–17)
HGB BLD-MCNC: 8.9 G/DL (ref 12.1–17)
HISTORY CHECKED?,CKHIST: NORMAL
IMM GRANULOCYTES # BLD AUTO: 0.1 K/UL (ref 0–0.04)
IMM GRANULOCYTES NFR BLD AUTO: 1 % (ref 0–0.5)
IRON SATN MFR SERPL: 31 % (ref 20–50)
IRON SERPL-MCNC: 44 UG/DL (ref 35–150)
LYMPHOCYTES # BLD: 1.5 K/UL (ref 0.8–3.5)
LYMPHOCYTES NFR BLD: 10 % (ref 12–49)
MAGNESIUM SERPL-MCNC: 1.8 MG/DL (ref 1.6–2.4)
MCH RBC QN AUTO: 26.1 PG (ref 26–34)
MCHC RBC AUTO-ENTMCNC: 34 G/DL (ref 30–36.5)
MCV RBC AUTO: 76.7 FL (ref 80–99)
MONOCYTES # BLD: 0.9 K/UL (ref 0–1)
MONOCYTES NFR BLD: 6 % (ref 5–13)
NEUTS SEG # BLD: 12 K/UL (ref 1.8–8)
NEUTS SEG NFR BLD: 82 % (ref 32–75)
NRBC # BLD: 0 K/UL (ref 0–0.01)
NRBC BLD-RTO: 0 PER 100 WBC
PLATELET # BLD AUTO: 320 K/UL (ref 150–400)
PMV BLD AUTO: 9.5 FL (ref 8.9–12.9)
POTASSIUM SERPL-SCNC: 3.8 MMOL/L (ref 3.5–5.1)
RBC # BLD AUTO: 2.57 M/UL (ref 4.1–5.7)
RBC MORPH BLD: ABNORMAL
SERVICE CMNT-IMP: ABNORMAL
SODIUM SERPL-SCNC: 134 MMOL/L (ref 136–145)
TIBC SERPL-MCNC: 140 UG/DL (ref 250–450)
WBC # BLD AUTO: 14.6 K/UL (ref 4.1–11.1)

## 2022-12-01 PROCEDURE — 74011250636 HC RX REV CODE- 250/636: Performed by: NURSE PRACTITIONER

## 2022-12-01 PROCEDURE — 82962 GLUCOSE BLOOD TEST: CPT

## 2022-12-01 PROCEDURE — P9016 RBC LEUKOCYTES REDUCED: HCPCS

## 2022-12-01 PROCEDURE — 74011250636 HC RX REV CODE- 250/636: Performed by: INTERNAL MEDICINE

## 2022-12-01 PROCEDURE — 83540 ASSAY OF IRON: CPT

## 2022-12-01 PROCEDURE — 83735 ASSAY OF MAGNESIUM: CPT

## 2022-12-01 PROCEDURE — 65270000046 HC RM TELEMETRY

## 2022-12-01 PROCEDURE — 74011250637 HC RX REV CODE- 250/637: Performed by: STUDENT IN AN ORGANIZED HEALTH CARE EDUCATION/TRAINING PROGRAM

## 2022-12-01 PROCEDURE — 86923 COMPATIBILITY TEST ELECTRIC: CPT

## 2022-12-01 PROCEDURE — 99233 SBSQ HOSP IP/OBS HIGH 50: CPT

## 2022-12-01 PROCEDURE — 85018 HEMOGLOBIN: CPT

## 2022-12-01 PROCEDURE — 80048 BASIC METABOLIC PNL TOTAL CA: CPT

## 2022-12-01 PROCEDURE — C9113 INJ PANTOPRAZOLE SODIUM, VIA: HCPCS | Performed by: INTERNAL MEDICINE

## 2022-12-01 PROCEDURE — 74011636637 HC RX REV CODE- 636/637: Performed by: INTERNAL MEDICINE

## 2022-12-01 PROCEDURE — 86900 BLOOD TYPING SEROLOGIC ABO: CPT

## 2022-12-01 PROCEDURE — 74011250637 HC RX REV CODE- 250/637: Performed by: NURSE PRACTITIONER

## 2022-12-01 PROCEDURE — 74011000250 HC RX REV CODE- 250: Performed by: INTERNAL MEDICINE

## 2022-12-01 PROCEDURE — 36415 COLL VENOUS BLD VENIPUNCTURE: CPT

## 2022-12-01 PROCEDURE — 77010033678 HC OXYGEN DAILY

## 2022-12-01 PROCEDURE — 74011250637 HC RX REV CODE- 250/637: Performed by: PHYSICIAN ASSISTANT

## 2022-12-01 PROCEDURE — 36430 TRANSFUSION BLD/BLD COMPNT: CPT

## 2022-12-01 PROCEDURE — 85025 COMPLETE CBC W/AUTO DIFF WBC: CPT

## 2022-12-01 RX ORDER — HYDROMORPHONE HYDROCHLORIDE 1 MG/ML
0.5 INJECTION, SOLUTION INTRAMUSCULAR; INTRAVENOUS; SUBCUTANEOUS
Status: DISCONTINUED | OUTPATIENT
Start: 2022-12-01 | End: 2022-12-02

## 2022-12-01 RX ORDER — SUCRALFATE 1 G/1
1 TABLET ORAL
Status: DISCONTINUED | OUTPATIENT
Start: 2022-12-01 | End: 2022-12-06 | Stop reason: HOSPADM

## 2022-12-01 RX ORDER — SODIUM CHLORIDE 9 MG/ML
250 INJECTION, SOLUTION INTRAVENOUS AS NEEDED
Status: DISCONTINUED | OUTPATIENT
Start: 2022-12-01 | End: 2022-12-06 | Stop reason: HOSPADM

## 2022-12-01 RX ORDER — LABETALOL HYDROCHLORIDE 5 MG/ML
20 INJECTION, SOLUTION INTRAVENOUS
Status: DISCONTINUED | OUTPATIENT
Start: 2022-12-01 | End: 2022-12-06 | Stop reason: HOSPADM

## 2022-12-01 RX ORDER — FUROSEMIDE 10 MG/ML
80 INJECTION INTRAMUSCULAR; INTRAVENOUS ONCE
Status: COMPLETED | OUTPATIENT
Start: 2022-12-01 | End: 2022-12-01

## 2022-12-01 RX ORDER — FUROSEMIDE 40 MG/1
80 TABLET ORAL DAILY
Status: DISCONTINUED | OUTPATIENT
Start: 2022-12-02 | End: 2022-12-06 | Stop reason: HOSPADM

## 2022-12-01 RX ADMIN — CARVEDILOL 12.5 MG: 12.5 TABLET, FILM COATED ORAL at 09:38

## 2022-12-01 RX ADMIN — Medication 8 UNITS: at 09:37

## 2022-12-01 RX ADMIN — HYDROMORPHONE HYDROCHLORIDE 0.5 MG: 1 INJECTION, SOLUTION INTRAMUSCULAR; INTRAVENOUS; SUBCUTANEOUS at 11:15

## 2022-12-01 RX ADMIN — HYDROMORPHONE HYDROCHLORIDE 0.5 MG: 1 INJECTION, SOLUTION INTRAMUSCULAR; INTRAVENOUS; SUBCUTANEOUS at 18:36

## 2022-12-01 RX ADMIN — DULOXETINE HYDROCHLORIDE 60 MG: 30 CAPSULE, DELAYED RELEASE ORAL at 09:38

## 2022-12-01 RX ADMIN — SODIUM CHLORIDE 250 ML: 9 INJECTION, SOLUTION INTRAVENOUS at 05:42

## 2022-12-01 RX ADMIN — PREGABALIN 75 MG: 25 CAPSULE ORAL at 22:42

## 2022-12-01 RX ADMIN — FINASTERIDE 5 MG: 5 TABLET, FILM COATED ORAL at 09:38

## 2022-12-01 RX ADMIN — HYDRALAZINE HYDROCHLORIDE 100 MG: 50 TABLET, FILM COATED ORAL at 09:38

## 2022-12-01 RX ADMIN — TAMSULOSIN HYDROCHLORIDE 0.4 MG: 0.4 CAPSULE ORAL at 09:38

## 2022-12-01 RX ADMIN — SODIUM CHLORIDE 40 MG: 9 INJECTION, SOLUTION INTRAMUSCULAR; INTRAVENOUS; SUBCUTANEOUS at 09:38

## 2022-12-01 RX ADMIN — CARVEDILOL 12.5 MG: 12.5 TABLET, FILM COATED ORAL at 17:15

## 2022-12-01 RX ADMIN — AMLODIPINE BESYLATE 10 MG: 5 TABLET ORAL at 09:38

## 2022-12-01 RX ADMIN — SUCRALFATE 1 G: 1 TABLET ORAL at 17:14

## 2022-12-01 RX ADMIN — FUROSEMIDE 80 MG: 10 INJECTION, SOLUTION INTRAMUSCULAR; INTRAVENOUS at 17:15

## 2022-12-01 RX ADMIN — SUCRALFATE 1 G: 1 TABLET ORAL at 11:15

## 2022-12-01 RX ADMIN — Medication 1 UNITS: at 22:44

## 2022-12-01 RX ADMIN — SUCRALFATE 1 G: 1 TABLET ORAL at 22:42

## 2022-12-01 RX ADMIN — SODIUM CHLORIDE 40 MG: 9 INJECTION, SOLUTION INTRAMUSCULAR; INTRAVENOUS; SUBCUTANEOUS at 22:42

## 2022-12-01 RX ADMIN — HYDRALAZINE HYDROCHLORIDE 100 MG: 50 TABLET, FILM COATED ORAL at 22:42

## 2022-12-01 RX ADMIN — HYDRALAZINE HYDROCHLORIDE 100 MG: 50 TABLET, FILM COATED ORAL at 17:15

## 2022-12-01 RX ADMIN — TRAMADOL HYDROCHLORIDE 50 MG: 50 TABLET, COATED ORAL at 00:07

## 2022-12-01 NOTE — PROGRESS NOTES
Received notification from bedside RN about patient with regards to: chest soreness rated at 7/10, requesting medication for relief  VS: /95, HR 85, RR 16, O2 sat 97% on NC 2 L    Intervention given: Tramadol 50 mg PO x 1 dose ordered    0407: Notified of H/H 6.7/19.7  VS: /76, HR 82, RR 18, O2 sat 96% on NC 2 L    - Transfuse 1 unit PRBC, CBC for tomorrow AM ordered    I have discussed with the patient the rationale for blood component transfusion; its benefits in treating or preventing fatigue, organ damage, or death; and its risk which includes mild transfusion reactions, rare risk of blood borne infection, or more serious but rare reactions. I have discussed the alternatives to transfusion, including the risk and consequences of not receiving transfusion. The patient had an opportunity to ask questions and had agreed to proceed with transfusion of blood components.

## 2022-12-01 NOTE — PROGRESS NOTES
Nephrology Progress Note  [facility] / [address], [address]  [place], [address]  Phone - [phone]  Fax - [phone]                 Patient: Janiya Razo                   YOB: 1980        Date- 12/1/2022                      Admit Date: 11/29/2022  CC: Follow up for esrd         IMPRESSION & PLAN:   ESRD- hd mwf at [facility]  Hyponatremia  na 115 on 11-29-22 with glucose 1181. Hyperkalemia  hypertension  DKA  H/o Urinary retention requiring hansen cath  Type 1 diabetes  Anemia      PLAN-  No dialysis today  Continue norvasc, hydralazine, coreg  Start losartan if bp remains high  Continue hd mwf  Epogen for anemia       Subjective: Interval History:   12-1-22---- s/p hd yesterday--bp high-- blood sugar improved to 127---na 134- improved,   11/30/22-- he has h/o esrd- he is on hd at [facility]. He has been to [facility] last week. He was discharged from [facility] recently. Objective:   Vitals:    12/01/22 0610 12/01/22 0638 12/01/22 0800 12/01/22 1118   BP: (!) 147/74 (!) 162/76 (!) 158/82 (!) 183/99   Pulse: 86 83 79 86   Resp: 16 16 18 18   Temp: 98.2 °F (36.8 °C) 98.3 °F (36.8 °C) 98.4 °F (36.9 °C) 97.9 °F (36.6 °C)   SpO2: 95% 94% 92% 93%   Weight:       Height:          11/30 0701 - 12/01 0700  In: 1991.6 [I.V.:1991.6]  Out: 550 [Urine:550]  Last 3 Recorded Weights in this Encounter    11/29/22 1750 11/29/22 1845 11/30/22 1826   Weight: 77.1 kg (170 lb) 77.1 kg (169 lb 15.6 oz) 75.8 kg (167 lb)        Physical exam:    GEN:  NAD  NECK:  Supple, no thyromegaly  RESP: Clear  b/l, no  wheezing,   CVS: RRR,S1,S2   NEURO: non focal, normal speech  EXT: Edema +nt     Permacath +    Chart reviewed. Pertinent Notes reviewed.      Data Review :  Recent Labs     12/01/22 0219 11/30/22  1304 11/30/22 0211 11/29/22 2048   * 131* 124* 117*   K 3.8 4.5 3.5 4.1    98 92* 83*   CO2 29 24 21 14*   BUN 27* 66* 65* 64*   CREA 3.12* 5.57* 5.83* 5.95*   * 123* 560* 1,013*   CA 7.4* 6.9* 7.4* 7.7*   MG 1.8 1.7 1.7 2.0   PHOS  --   --   --  6.0*       Recent Labs     12/01/22 0219 11/30/22 0211 11/29/22  1855   WBC 14.6* 11.7* 15.2*   HGB 6.7* 7.2* 8.9*   HCT 19.7* 21.2* 28.3*    294 392       Recent Labs     12/01/22 0219   TIBC 140*   PSAT 31            Lab Results   Component Value Date/Time    Hemoglobin A1c 10.1 (H) 11/29/2022 08:48 PM    Hemoglobin A1c 7.2 (H) 09/26/2022 09:34 AM    Hemoglobin A1c 8.7 (H) 08/12/2022 12:41 AM        Lab Results   Component Value Date/Time    Microalbumin/Creat ratio (mg/g creat) 11,439 (H) 04/01/2021 10:00 AM    Microalbumin,urine random 151.00 04/01/2021 10:00 AM     Lab Results   Component Value Date/Time    NT pro-BNP 32,663 (H) 11/29/2022 06:55 PM    NT pro-BNP 3,665 (H) 10/22/2022 02:12 AM    NT pro-BNP 5,688 (H) 09/06/2022 09:16 AM    NT pro-BNP 11,477 (H) 08/16/2022 12:50 AM    NT pro-BNP 4,600 (H) 08/09/2022 02:13 PM     US Results (most recent):  Medication list  reviewed  Current Facility-Administered Medications   Medication Dose Route Frequency    0.9% sodium chloride infusion 250 mL  250 mL IntraVENous PRN    HYDROmorphone (DILAUDID) injection 0.5 mg  0.5 mg IntraVENous Q6H PRN    sucralfate (CARAFATE) tablet 1 g  1 g Oral AC&HS    insulin glargine (LANTUS) injection 8 Units  8 Units SubCUTAneous DAILY    insulin lispro (HUMALOG) injection   SubCUTAneous AC&HS    glucose chewable tablet 16 g  4 Tablet Oral PRN    glucagon (GLUCAGEN) injection 1 mg  1 mg IntraMUSCular PRN    dextrose 10% infusion 0-250 mL  0-250 mL IntraVENous PRN    pantoprazole (PROTONIX) 40 mg in 0.9% sodium chloride 10 mL injection  40 mg IntraVENous Q12H    [Held by provider] insulin regular (NOVOLIN R, HUMULIN R) 100 Units in 0.9% sodium chloride 100 mL infusion  1 Units/hr IntraVENous TITRATE    glucose chewable tablet 16 g  4 Tablet Oral PRN    glucagon (GLUCAGEN) injection 1 mg  1 mg IntraMUSCular PRN    dextrose 10 % infusion 0-250 mL  0-250 mL IntraVENous PRN    tamsulosin (FLOMAX) capsule 0.4 mg  0.4 mg Oral DAILY    carvediloL (COREG) tablet 12.5 mg  12.5 mg Oral BID WITH MEALS    amLODIPine (NORVASC) tablet 10 mg  10 mg Oral DAILY    finasteride (PROSCAR) tablet 5 mg  5 mg Oral DAILY    DULoxetine (CYMBALTA) capsule 60 mg  60 mg Oral DAILY    [Held by provider] aspirin chewable tablet 81 mg  81 mg Oral DAILY    hydrALAZINE (APRESOLINE) tablet 100 mg  100 mg Oral TID    pregabalin (LYRICA) capsule 75 mg  75 mg Oral QHS    ondansetron (ZOFRAN) injection 4 mg  4 mg IntraVENous Q4H PRN        Chandra Hamilton MD  12/1/2022

## 2022-12-01 NOTE — DIABETES MGMT
[address] [place] [address]  DIABETES MANAGEMENT CONSULT    Consulted by Provider for advanced nursing evaluation and care for inpatient blood glucose management. Evaluation and Action Plan   This 38year old male patient with Type 1 diabetes did not achieve BG control prior to admission as evidenced by an A1c of 10.1%. The patient was using an insulin pump but it was removed after a recent admission at [address] [place] [address] per patient. The patient was told to not resume the insulin pump until he saw his endocrinologist. The patient was started on glucostabilizer for DKA during this admission. The patient was transition off using 8 units Lantus daily. The patient has Type 1 diabetes and will require the addition of meal time insulin once he is eating consistently. He is not eating much today and will be NPO after midnight for an upper endoscopy tomorrow. Management Rationale Action Plan   Medication   Basal needs Using 8 units/kg/D based on 0.1xkg Continue 8 units Lantus for now. The patient will require the addition of meal time insulin once eating. Nutritional needs     Corrective insulin Using high sensitivity          Referral []        Outpatient diabetes education  [] Behavioral health  [] Inpatient nutrition services  [] Pharmacy services          Initial Presentation   Mercy Griffin is a 38 y.o. male admitted from home on 11/29/2022 after experiencing nausea, vomiting and lethargy. LAB: Glucose 1,181. Creatine 6. 11. GFR 11. Anion Gap 23.  A1c 10.1%  CXR:  CT:  MRI:    HX:   Past Medical History:   Diagnosis Date    Bipolar 1 disorder, depressed ([address]     Bipolar disorder ([facility]     Chronic kidney disease, stage 3a ([address]     Depression     Diabetes ([address]     DKA, type 1 ([address] 1/27/2013    diagnosed age 23    DKA, type 1 ([address]     H/O noncompliance with medical treatment, presenting hazards to health     MRSA (methicillin resistant staph aureus) culture positive     MRSA (methicillin resistant Staphylococcus aureus)     Face    Noncompliance with medication regimen     Second hand smoke exposure     Seizure ([facility]     Seizures ([facility] 2006 or 2007    one episode during retirement        INITIAL DX:   DKA (diabetic ketoacidosis) ([facility] [E11.10]     Current Treatment     TX: BP management. Insulin. Protonix. Lyrica. Cymbalta. Hospital Course   Clinical progress has been uncomplicated. Diabetes History   The patient reports a 20 year history of Type 1 diabetes. He has a positive family hx of diabetes (mom & niece). He states today that he sees Dr. Haywood Gowers (endocrinologist) for his diabetes. He was recently started on a Tandem insulin pump on 07/15/22. Unsure of which CGM is in use. The patient had a recent admission at [facility] and was reportedly told to not resume the insulin pump until he is evaluated by his endocrinologist.        Diabetes-related Medical History  Acute complications  DKA  Neurological complications  Gastroparesis and Peripheral neuropathy  Microvascular disease  Nephropathy  Macrovascular disease  Myocardial infarction      Diabetes Medication History  Key Antihyperglycemic Medications               insulin glargine (LANTUS) 100 unit/mL injection Take 10 units once daily at night    insulin lispro (HUMALOG) 100 unit/mL injection INITIATE CORRECTIVE INSULIN PROTOCOL (FREDA): High Sensitivity (thin, ESRD): AC (before meals), Q6H CORRECTIONAL SCALE only For Blood Sugar (mg/dl) of :           140-199=0 units          200-249=2 units 250-299=3 units 300-349=4 units 350-400=6 units 401 or greater = Call MD Give in addition to basal medications. Do Not Hold for NPO BEDTIME CORRECTIONAL sliding scale when scheduled: 200-249=1 units 250-349=2 units 350-400=3 units 401 or greater = Call MD Give in addition to basal medications. Do Not Hold for NPO Fast Acting - Administer Immediately - or within 15 minutes of start of meal, if mealtime coverage.            Diabetes self-management practices:   Eating pattern Deferred                   Physical activity pattern              [x]        Not employing a physical activity program to control BG     Monitoring pattern              [x]        Testing BGs sufficiently to inform self-management adjustments                ( Using Dexcom G6)  Taking medications pattern ( not using insulin pump at this time)  [x]        Affordable  Social determinants of health impacting diabetes self-management practices   Concerned that you need to know more about how to stay healthy with diabetes  Overall evaluation:               [x]        Unknown if achieving A1c target with drug therapy & self-care practices      Subjective   I have been trying to reach my doctor     Objective   Physical exam  General Normal weight male in no acute distress. Conversant and cooperative  Neuro  Alert, oriented   Vital Signs Visit Vitals  BP (!) 142/79 (BP 1 Location: Right lower arm, BP Patient Position: Lying)   Pulse 77   Temp 97.6 °F (36.4 °C)   Resp 18   Ht 5' 8\" (1.727 m)   Wt 75.8 kg (167 lb)   SpO2 93%   BMI 25.39 kg/m²         Laboratory  Recent Labs     12/01/22  0219 11/30/22  1304 11/30/22  0211 11/29/22  2048 11/29/22  1855   * 123* 560*   < > 1,181*   AGAP 5 9 11   < > 23*   WBC 14.6*  --  11.7*  --  15.2*   CREA 3.12* 5.57* 5.83*   < > 6.11*   AST  --   --  13*  --  17   ALT  --   --  19  --  26    < > = values in this interval not displayed. Factors impacting BG management  Factor Dose Comments   Nutrition:  Standard meals     60 grams/meal     Not eating each. Will be NPO after midnight.     Pain PRN Dilaudid    Other:   Kidney function       CKD   Hemodialysis patient     Blood glucose pattern    Significant diabetes-related events over the past 24-72 hours      Assessment and Nursing Intervention   Nursing Diagnosis Risk for unstable blood glucose pattern   Nursing Intervention Domain 9955 Decision-making Support   Nursing Interventions Examined current inpatient diabetes/blood glucose control   Explored factors facilitating and impeding inpatient management  Explored corrective strategies with patient and responsible inpatient provider   Informed patient of rational for insulin strategy while hospitalized       Billing Code(s)   [] 88767 IP subsequent hospital care - 50 minutes   [x] 66320 IP subsequent hospital care - 35 minutes   [] [id number] [id number] IP subsequent hospital care - 25 minutes   [] [id number] IP initial hospital care - 40 minutes     Before making these care recommendations, I personally reviewed the hospitalization record, including notes, laboratory & diagnostic data and current medications, and examined the patient at the bedside (circumstances permitting) before making care recommendations. More than fifty (50) percent of the time was spent in patient counseling and/or care coordination.   Total minutes: 35 minutes    ANEESH Hutchins  Diabetes Clinical Nurse Specialist  [facility]  Access via Canpages

## 2022-12-01 NOTE — CONSULTS
Gastroenterology Consult  ([place], [place] for Dr. Mallory)     Referring Physician: Dr. Potter Necessary Date: 12/1/2022     Subjective:     Chief Complaint: vomiting    History of Present Illness: Yajaira Taylor is a 38 y.o. male with poorly controlled type 1 DM, CAD s/p NSTEMI last week at [facility], CHF with EF 45-50%, ESRD on dialysis who is seen in consultation for anemia. He was recently admitted to [facility] for chest pain, he required intubation, mechanical ventilation, CRRT and pressors due to DKA and acute hypoxic respiratory failure. His course was complicated by an NSTEMI with a left heart cath without significant CAD. Endocrinology recommended continuing his insulin pump at discharge with recommendations for long-acting and mealtime insulin. Patient's mother reported to ED staff that patient missed his Friday and Monday dialysis as he was hospitalized and nobody picked him up on Monday. She states that he has been complaining of chest pain, lethargy and vomiting since his discharge. His emesis was reportedly blood-tinged. On admission 11/29/22, his glucose was >1,000. Anion gap 23, , BUN 65, Crt 6.11, Phos 6.0. He has been treated for DKA and has received dialysis. His BUN/Crt/electrolytes have improved. His anion gap has closed and his glucose has corrected. Hgb was 8.9 on admission. Baseline hgb 7-8 while at [facility]. Hgb dropped to 7.2 yesterday and then 6.7 today. He denies any hematemesis in 2 days. He states he is going blind and can't tell if his stools are bloody or black. He had an EGD during an admission in July that showed grade D esophagitis. He failed to follow up as directed and have repeat EGD. I note that he also had grade C esophagitis in 2018. He has been compliant with his daily PPI. He takes 81mg ASA but denies any other blood thinners. His troponin is elevated at 242. He does report chest pain but has reproducible chest wall tenderness. He received a unit of PRBC's this AM.  His protonix was increased to BID. He ate a full breakfast this AM.  He is comfortable at the moment with no major c/o and VSS.     Past Medical History:   Diagnosis Date    Bipolar 1 disorder, depressed ([facility]     Bipolar disorder ([address]     Chronic kidney disease, stage 3a ([address]     Depression     Diabetes ([facility]     DKA, type 1 ([address] 2013    diagnosed age 23    DKA, type 1 ([address]     H/O noncompliance with medical treatment, presenting hazards to health     MRSA (methicillin resistant staph aureus) culture positive     MRSA (methicillin resistant Staphylococcus aureus)     Face    Noncompliance with medication regimen     Second hand smoke exposure     Seizure ([facility]     Seizures ([facility]  or     one episode during care home     Past Surgical History:   Procedure Laterality Date    HX HEENT      top left wisdom tooth    HX ORTHOPAEDIC Left     wrist; MCV    IR INSERT NON TUNL CVC OVER 5 YRS  2022    IR INSERT TUNL CVC W/O PORT OVER 5 YR  2022    UPPER GI ENDOSCOPY,BIOPSY  2018           Family History   Problem Relation Age of Onset    Diabetes Mother     Diabetes Other         neice, type 1      Social History     Tobacco Use    Smoking status: Former     Packs/day: 0.10     Years: 16.00     Pack years: 1.60     Types: Cigarettes     Start date: 10/4/1999     Quit date: 2020     Years since quittin.7    Smokeless tobacco: Never   Substance Use Topics    Alcohol use: No      No Known Allergies  Current Facility-Administered Medications   Medication Dose Route Frequency    0.9% sodium chloride infusion 250 mL  250 mL IntraVENous PRN    insulin glargine (LANTUS) injection 8 Units  8 Units SubCUTAneous DAILY    insulin lispro (HUMALOG) injection   SubCUTAneous AC&HS    glucose chewable tablet 16 g  4 Tablet Oral PRN    glucagon (GLUCAGEN) injection 1 mg  1 mg IntraMUSCular PRN    dextrose 10% infusion 0-250 mL  0-250 mL IntraVENous PRN pantoprazole (PROTONIX) 40 mg in 0.9% sodium chloride 10 mL injection  40 mg IntraVENous Q12H    [Held by provider] insulin regular (NOVOLIN R, HUMULIN R) 100 Units in 0.9% sodium chloride 100 mL infusion  1 Units/hr IntraVENous TITRATE    insulin lispro (HUMALOG) injection   SubCUTAneous TIDAC    glucose chewable tablet 16 g  4 Tablet Oral PRN    glucagon (GLUCAGEN) injection 1 mg  1 mg IntraMUSCular PRN    dextrose 10 % infusion 0-250 mL  0-250 mL IntraVENous PRN    tamsulosin (FLOMAX) capsule 0.4 mg  0.4 mg Oral DAILY    carvediloL (COREG) tablet 12.5 mg  12.5 mg Oral BID WITH MEALS    amLODIPine (NORVASC) tablet 10 mg  10 mg Oral DAILY    finasteride (PROSCAR) tablet 5 mg  5 mg Oral DAILY    DULoxetine (CYMBALTA) capsule 60 mg  60 mg Oral DAILY    [Held by provider] aspirin chewable tablet 81 mg  81 mg Oral DAILY    hydrALAZINE (APRESOLINE) tablet 100 mg  100 mg Oral TID    pregabalin (LYRICA) capsule 75 mg  75 mg Oral QHS    ondansetron (ZOFRAN) injection 4 mg  4 mg IntraVENous Q4H PRN        Review of Systems:  A detailed 10 organ review of systems is obtained with pertinent positives as listed in the History of Present Illness and Past Medical History. All others are negative. Objective:     Physical Exam:  Visit Vitals  BP (!) 158/82 (BP 1 Location: Right upper arm, BP Patient Position: At rest)   Pulse 79   Temp 98.4 °F (36.9 °C)   Resp 18   Ht 5' 8\" (1.727 m)   Wt 75.8 kg (167 lb)   SpO2 92%   BMI 25.39 kg/m²        Skin:  Extremities and face reveal no rashes. No schumacher erythema. No telangiectasias on the chest wall. HEENT: Sclerae anicteric. Extra-occular muscles are intact. No oral ulcers. No abnormal pigmentation of the lips. The neck is supple. Cardiovascular: Regular rate and rhythm. No murmurs, gallops, or rubs. PMI nondisplaced. Carotids without bruits. Respiratory:  Comfortable breathing with no accessory muscle use. Clear breath sounds with no wheezes, rales, or rhonchi.   GI:  Abdomen nondistended, soft, and nontender. Normal active bowel sounds. No enlargement of the liver or spleen. No masses palpable. Rectal:  Deferred  Musculoskeletal:  No pitting edema of the lower legs. Extremities have good range of motion. No costovertebral tenderness. Neurological:  Gross memory appears intact. Patient is alert and oriented. Psychiatric:  Mood appears appropriate with judgement intact. Lymphatic:  No cervical or supraclavicular adenopathy. Lab/Data Review:  CMP:   Lab Results   Component Value Date/Time     (L) 12/01/2022 02:19 AM    K 3.8 12/01/2022 02:19 AM     12/01/2022 02:19 AM    CO2 29 12/01/2022 02:19 AM    AGAP 5 12/01/2022 02:19 AM     (H) 12/01/2022 02:19 AM    BUN 27 (H) 12/01/2022 02:19 AM    CREA 3.12 (H) 12/01/2022 02:19 AM    CA 7.4 (L) 12/01/2022 02:19 AM    MG 1.8 12/01/2022 02:19 AM     CBC:   Lab Results   Component Value Date/Time    WBC 14.6 (H) 12/01/2022 02:19 AM    HGB 6.7 (L) 12/01/2022 02:19 AM    HCT 19.7 (L) 12/01/2022 02:19 AM     12/01/2022 02:19 AM         Assessment/Plan:      39 y/o male with multiple comorbidities and hospitalizations including a recent admission to [facility] ICU for respiratory failure which required intubation, CRRT, pressor support and was complicated by an NSTEMI. Cardiac cath was unremarkable and is not on any blood thinners other than 81mg ASA. He was readmitted here 2 days ago with acute on chronic renal failure after missing 2 dialysis sessions and DKA with glucose >1,000 and anion gap 23. He was vomiting for 2 days and reports emesis was blood tinged. We are consulted for anemia. His hgb has trended down from 8>7>6. His baseline hgb is 7-8. He received a unit of PRBC's this AM.  His hematemesis has resolved and he is tolerating a regular diet. (He ate a full breakfast this AM). He states he is going blind and cannot tell if his stools are black or bloody but seem normal consistency to him.     He has a hx of grade D esophagitis and was non compliant with follow up. He has been taking his PPI however. We recommend an EGD for further evaluation but this will have to be postponed until tomorrow as he is not NPO. A post transfusion H/H should be checked and should be monitored. Given CAD, I recommend keeping hgb >8. As he cannot see the color of his stools, I've asked nursing to keep an eye on this and notify us of melena or hematochezia. He should continue BID PPI and I will add sucralfate. He can have a regular diet but should be kept NPO after MN for EGD tomorrow.     MANUEL Marie  12/01/22  10:09 AM

## 2022-12-01 NOTE — ADT AUTH CERT NOTES
Agustin Guillen, RN   Registered Nurse   NURSING   Progress Notes      Addendum   Date of Service:  12/01/22 0709                                                                                                                                                                                                                                                             0700  End of Shift Note     Bedside shift change report given to  (oncoming nurse) by Jonas Ortiz RN (offgoing nurse). Report included the following information SBAR, Kardex, ED Summary, OR Summary, Procedure Summary, Intake/Output, MAR, Accordion, Recent Results, Med Rec Status, and Cardiac Rhythm NSR     Shift worked:  7p to 7a      Shift summary and any significant changes:      Had HD 7p to 10p. AM labs show Hgb 6.7, transfusion ordered. 1 unit PRBC transfusion in progress. Concerns for physician to address: Possible GI Consult? Zone phone for oncoming shift:            Activity:  Activity Level: Up with Assistance  Number times ambulated in hallways past shift: 0  Number of times OOB to chair past shift: 0     Cardiac:   Cardiac Monitoring: Yes      Cardiac Rhythm: Sinus Rhythm     Access:  Current line(s): PIV      Genitourinary:   Urinary status: oliguric     Respiratory:   O2 Device: Nasal cannula  Chronic home O2 use?: NO  Incentive spirometer at bedside: NO     GI:  Last Bowel Movement Date: 11/30/22  Current diet:  ADULT DIET Regular; 4 carb choices (60 gm/meal); Low Potassium (Less than 3000 mg/day)  Passing flatus: YES  Tolerating current diet: YES     Pain Management:   Patient states pain is manageable on current regimen: YES     Skin:  Corey Score: 19  Interventions: increase time out of bed, limit briefs, and nutritional support     Patient Safety:  Fall Score:  Total Score: 3  Interventions: bed/chair alarm, assistive device (walker, cane, etc), gripper socks, and pt to call before getting OOB  High Fall Risk: Yes     Length of Stay:  Expected LOS: - - -  Actual LOS: 2        Faustino Harrell, RN                                                 Revision History

## 2022-12-01 NOTE — CONSULTS
Sterling Francheska         NAME:Chapincito Sun Counts  LPR:[id number]   :1980       Patient seen  Esrd      DKA (diabetic ketoacidosis) ([address] [E11.10]     Galo Jones MD  [facility]  [facility]  [address]  [place], [address]  Phone - [phone]         Fax - [phone] [facility] Office  [address] [place] [address]  Phone - [phone]        Fax - [phone]

## 2022-12-01 NOTE — PROGRESS NOTES
Transition of Care Plan:    RUR: 37% \"high risk\"  Disposition: Home with Home Health  If SNF or IPR: Date FOC offered: N/A  Date FOC received: N/A  Date authorization started with reference number: N/A  Date authorization received and expires: N/A  Accepting facility: N/A  Follow up appointments: PCP & Specialists as needed  DME needed: TBD  Transportation at Discharge: Family to provide transport at d/c  [place] or means to access home: Pt has access to home    IM Medicare Letter: N/A  Is patient a [place] and connected with the [place]? N/A  Caregiver Contact: Pt's mother Karey Roy [phone]  Discharge Caregiver contacted prior to discharge? To be contacted  Care Conference needed?: Not at this time    Initial note: Chart reviewed for updates. CM met with pt at bedside introduced role and discussed d/c planning. CM discussed PT/OT recommendations. Pt agreeable with Home Health. Family to provide transport. No questions/concerns reported by pt. Referrla sent to [facility]. [facility] pt. CM will continue to follow up.       KAVEH Peralta    [phone]

## 2022-12-01 NOTE — PROGRESS NOTES
Problem: Diabetes Self-Management  Goal: *Disease process and treatment process  Description: Define diabetes and identify own type of diabetes; list 3 options for treating diabetes. Outcome: Progressing Towards Goal  Goal: *Incorporating nutritional management into lifestyle  Description: Describe effect of type, amount and timing of food on blood glucose; list 3 methods for planning meals. Outcome: Progressing Towards Goal  Goal: *Incorporating physical activity into lifestyle  Description: State effect of exercise on blood glucose levels. Outcome: Progressing Towards Goal  Goal: *Developing strategies to promote health/change behavior  Description: Define the ABC's of diabetes; identify appropriate screenings, schedule and personal plan for screenings. Outcome: Progressing Towards Goal  Goal: *Using medications safely  Description: State effect of diabetes medications on diabetes; name diabetes medication taking, action and side effects. Outcome: Progressing Towards Goal  Goal: *Monitoring blood glucose, interpreting and using results  Description: Identify recommended blood glucose targets  and personal targets. Outcome: Progressing Towards Goal  Goal: *Prevention, detection, treatment of acute complications  Description: List symptoms of hyper- and hypoglycemia; describe how to treat low blood sugar and actions for lowering  high blood glucose level. Outcome: Progressing Towards Goal  Goal: *Prevention, detection and treatment of chronic complications  Description: Define the natural course of diabetes and describe the relationship of blood glucose levels to long term complications of diabetes.   Outcome: Progressing Towards Goal  Goal: *Developing strategies to address psychosocial issues  Description: Describe feelings about living with diabetes; identify support needed and support network  Outcome: Progressing Towards Goal  Goal: *Insulin pump training  Outcome: Progressing Towards Goal  Goal: *Sick day guidelines  Outcome: Progressing Towards Goal  Goal: *Patient Specific Goal (EDIT GOAL, INSERT TEXT)  Outcome: Progressing Towards Goal     Problem: Patient Education: Go to Patient Education Activity  Goal: Patient/Family Education  Outcome: Progressing Towards Goal     Problem: Patient Education: Go to Patient Education Activity  Goal: Patient/Family Education  Outcome: Progressing Towards Goal     Problem: Pressure Injury - Risk of  Goal: *Prevention of pressure injury  Description: Document Corey Scale and appropriate interventions in the flowsheet. Outcome: Progressing Towards Goal  Note: Pressure Injury Interventions:  Sensory Interventions: Assess changes in LOC, Assess need for specialty bed, Float heels, Maintain/enhance activity level    Moisture Interventions: Absorbent underpads    Activity Interventions: Increase time out of bed    Mobility Interventions: HOB 30 degrees or less    Nutrition Interventions: Document food/fluid/supplement intake    Friction and Shear Interventions: Apply protective barrier, creams and emollients                Problem: Patient Education: Go to Patient Education Activity  Goal: Patient/Family Education  Outcome: Progressing Towards Goal     Problem: Falls - Risk of  Goal: *Absence of Falls  Description: Document Shannon Fall Risk and appropriate interventions in the flowsheet.   Outcome: Progressing Towards Goal  Note: Fall Risk Interventions:  Mobility Interventions: Assess mobility with egress test, Bed/chair exit alarm, Communicate number of staff needed for ambulation/transfer, Patient to call before getting OOB, Utilize walker, cane, or other assistive device         Medication Interventions: Patient to call before getting OOB, Bed/chair exit alarm, Assess postural VS orthostatic hypotension    Elimination Interventions: Bed/chair exit alarm, Call light in reach, Patient to call for help with toileting needs              Problem: Patient Education: Go to Patient Education Activity  Goal: Patient/Family Education  Outcome: Progressing Towards Goal     Problem: Patient Education: Go to Patient Education Activity  Goal: Patient/Family Education  Outcome: Progressing Towards Goal

## 2022-12-01 NOTE — PROGRESS NOTES
Stiven Mendez NP in to speak to patient about blood transfusion and possible GI consult. Pt voices understanding. Blood already sent for type and cross, consent placed in chart box.

## 2022-12-01 NOTE — PROGRESS NOTES
Hospitalist Progress Note    NAME: Miguel Mcfadden   :  1980   MRN:  [id number]       Assessment / Plan:    DKA  Patient recently admitted to ICU on  at [facility] with a DKA and respiratory failure requiring intubation and mechanical ventilation, CRRT and pressors  Was initially on insulin drip  Anion gap closed bicarb normal  Lantus 8 unit uptitrate as tolerated   Diabetic management consult diabetic diet  Continue SSI    Off note-Patient was on insulin pump  before which was discontinued on previous hospitalization. Recommended Lantus 10 unit daily with aspart 3 units with meals and SSI on discharge    Anemia  Hemoglobin trended down to 7.2>6.7  Will change to pantoprazole 40 IV twice daily  Status post 1 unit PRBC  (Per discharge summary hemoglobin was around 7.4-7.6 at [facility])  GI consulted  Previous EGD  grade D esophagitis and gastric erosions,  Plan EGD tomorrow n.p.o. after midnight  Added sucralfate        ESRD on hemodialysis  Nephrology consulted  Continue MWF schedule  On sevelamer at home      Chest pain  Less likely ACS  Troponin slightly trended up to 200  Standing and lifting  Patient had recent LHC early November not showing any significant CAD  Dilaudid as needed      HTN   - Will c/w home Norvasc, Coreg hydralazine     CAD  - Will continue with home asa, coreg     DM Neuropathy  - C/w home Cymbalta     BPH  - C/w home proscar, tamsulosin         25.0 - 29.9 Overweight / Body mass index is 25.39 kg/m². Estimated discharge date:   Barriers:    Code status: Full  Prophylaxis: scd  Recommended Disposition: Home w/Family     Subjective:     Chief Complaint / Reason for Physician Visit  \" Currently comfortable no new complaints. Denies any abdominal pain nausea vomiting. Discussed with RN events overnight.      Review of Systems: All pertinent positive as above all other systems negative  Symptom Y/N Comments  Symptom Y/N Comments   Fever/Chills    Chest Pain     Poor Appetite    Edema     Cough    Abdominal Pain     Sputum    Joint Pain     SOB/JOHNSTON    Pruritis/Rash     Nausea/vomit    Tolerating PT/OT     Diarrhea    Tolerating Diet     Constipation    Other       Could NOT obtain due to:      Objective:     VITALS:   Last 24hrs VS reviewed since prior progress note. Most recent are:  Patient Vitals for the past 24 hrs:   Temp Pulse Resp BP SpO2   12/01/22 1445 97.6 °F (36.4 °C) 77 18 (!) 142/79 --   12/01/22 1118 97.9 °F (36.6 °C) 86 18 (!) 183/99 93 %   12/01/22 0900 98 °F (36.7 °C) 89 18 (!) 173/94 94 %   12/01/22 0800 98.4 °F (36.9 °C) 79 18 (!) 158/82 92 %   12/01/22 0638 98.3 °F (36.8 °C) 83 16 (!) 162/76 94 %   12/01/22 0610 98.2 °F (36.8 °C) 86 16 (!) 147/74 95 %   12/01/22 0556 98.3 °F (36.8 °C) 83 16 (!) 158/84 95 %   12/01/22 0541 98 °F (36.7 °C) 82 18 (!) 150/79 97 %   12/01/22 0328 98 °F (36.7 °C) 82 18 (!) 181/76 96 %   11/30/22 2320 98.5 °F (36.9 °C) 85 16 (!) 134/95 97 %   11/30/22 2213 98.5 °F (36.9 °C) 91 16 (!) 150/72 96 %   11/30/22 2144 98.6 °F (37 °C) 87 16 (!) 163/73 --   11/30/22 2130 -- 82 16 (!) 171/81 --   11/30/22 2115 -- 82 16 (!) 178/80 --   11/30/22 2100 -- 81 16 (!) 156/74 --   11/30/22 2045 -- 81 16 (!) 172/82 --   11/30/22 2030 -- 81 16 (!) 155/73 --   11/30/22 2015 -- 78 16 (!) 141/72 --   11/30/22 2000 -- 77 16 (!) 159/76 --   11/30/22 1945 -- 78 16 (!) 155/77 --   11/30/22 1930 -- 79 16 (!) 156/74 --   11/30/22 1915 -- 79 16 (!) 156/78 --   11/30/22 1900 -- 79 16 (!) 158/80 --   11/30/22 1845 -- 81 16 (!) 176/87 --   11/30/22 1826 98 °F (36.7 °C) 81 16 (!) 173/88 --         Intake/Output Summary (Last 24 hours) at 12/1/2022 1623  Last data filed at 12/1/2022 0820  Gross per 24 hour   Intake 472.04 ml   Output 800 ml   Net -327.96 ml          I had a face to face encounter and independently examined this patient on 12/1/2022, as outlined below:  PHYSICAL EXAM:  General: WD, WN. Alert, cooperative, no acute distress    EENT:  EOMI.  Anicteric sclerae. MMM  Resp:  CTA bilaterally, no wheezing or rales. No accessory muscle use  CV:  Regular  rhythm,  No edema  GI:  Soft, Non distended, Non tender. +Bowel sounds  Neurologic:  Alert and oriented X 3, normal speech,   Psych:   Good insight. Not anxious nor agitated  Skin:  No rashes. No jaundice    Reviewed most current lab test results and cultures  YES  Reviewed most current radiology test results   YES  Review and summation of old records today    NO  Reviewed patient's current orders and MAR    YES  PMH/SH reviewed - no change compared to H&P  ________________________________________________________________________  Care Plan discussed with:    Comments   Patient x    Family      RN x    Care Manager     Consultant                        Multidiciplinary team rounds were held today with , nursing, pharmacist and clinical coordinator. Patient's plan of care was discussed; medications were reviewed and discharge planning was addressed. ________________________________________________________________________  Total NON critical care TIME:  37 Minutes    Total CRITICAL CARE TIME Spent:   Minutes non procedure based      Comments   >50% of visit spent in counseling and coordination of care x    ________________________________________________________________________  Gertrudis Wharton MD     Procedures: see electronic medical records for all procedures/Xrays and details which were not copied into this note but were reviewed prior to creation of Plan. LABS:  I reviewed today's most current labs and imaging studies.   Pertinent labs include:  Recent Labs     12/01/22  1143 12/01/22  0219 11/30/22  0211 11/29/22  1855   WBC  --  14.6* 11.7* 15.2*   HGB 8.9* 6.7* 7.2* 8.9*   HCT 26.6* 19.7* 21.2* 28.3*   PLT  --  320 294 392       Recent Labs     12/01/22  0219 11/30/22  1304 11/30/22  0211 11/29/22  2048 11/29/22  1855 11/29/22  1855   * 131* 124* 117*  --  115*   K 3.8 4.5 3.5 4.1  -- 4.0    98 92* 83*  --  80*   CO2 29 24 21 14*  --  12*   * 123* 560* 1,013*  --  1,181*   BUN 27* 66* 65* 64*  --  65*   CREA 3.12* 5.57* 5.83* 5.95*  --  6.11*   CA 7.4* 6.9* 7.4* 7.7*  --  7.8*   MG 1.8 1.7 1.7 2.0   < >  --    PHOS  --   --   --  6.0*  --   --    ALB  --   --  1.2*  --   --  1.5*   TBILI  --   --  0.4  --   --  0.3   ALT  --   --  19  --   --  26    < > = values in this interval not displayed.          Signed: Zahira Lazo MD

## 2022-12-01 NOTE — PROGRESS NOTES
0700  End of Shift Note    Bedside shift change report given to  (oncoming nurse) by Jodee Lott RN (offgoing nurse). Report included the following information SBAR, Kardex, ED Summary, OR Summary, Procedure Summary, Intake/Output, MAR, Accordion, Recent Results, Med Rec Status, and Cardiac Rhythm NSR    Shift worked:  7p to 7a     Shift summary and any significant changes:     Had HD 7p to 10p. AM labs show Hgb 6.7, transfusion ordered. 1 unit PRBC transfusion in progress. Concerns for physician to address: Possible GI Consult? Zone phone for oncoming shift:          Activity:  Activity Level: Up with Assistance  Number times ambulated in hallways past shift: 0  Number of times OOB to chair past shift: 0    Cardiac:   Cardiac Monitoring: Yes      Cardiac Rhythm: Sinus Rhythm    Access:  Current line(s): PIV     Genitourinary:   Urinary status: oliguric    Respiratory:   O2 Device: Nasal cannula  Chronic home O2 use?: NO  Incentive spirometer at bedside: NO       GI:  Last Bowel Movement Date: 11/30/22  Current diet:  ADULT DIET Regular; 4 carb choices (60 gm/meal); Low Potassium (Less than 3000 mg/day)  Passing flatus: YES  Tolerating current diet: YES       Pain Management:   Patient states pain is manageable on current regimen: YES    Skin:  Corey Score: 19  Interventions: increase time out of bed, limit briefs, and nutritional support     Patient Safety:  Fall Score:  Total Score: 3  Interventions: bed/chair alarm, assistive device (walker, cane, etc), gripper socks, and pt to call before getting OOB  High Fall Risk: Yes    Length of Stay:  Expected LOS: - - -  Actual LOS: 2      Jodee Lott RN

## 2022-12-02 ENCOUNTER — ANESTHESIA EVENT (OUTPATIENT)
Dept: ENDOSCOPY | Age: 42
DRG: 420 | End: 2022-12-02
Payer: MEDICAID

## 2022-12-02 ENCOUNTER — ANESTHESIA (OUTPATIENT)
Dept: ENDOSCOPY | Age: 42
DRG: 420 | End: 2022-12-02
Payer: MEDICAID

## 2022-12-02 LAB
ABO + RH BLD: NORMAL
ALBUMIN SERPL-MCNC: 1.2 G/DL (ref 3.5–5)
ANION GAP BLD CALC-SCNC: 10 MMOL/L (ref 10–20)
ANION GAP SERPL CALC-SCNC: 8 MMOL/L (ref 5–15)
BASOPHILS # BLD: 0 K/UL (ref 0–0.1)
BASOPHILS NFR BLD: 0 % (ref 0–1)
BLD PROD TYP BPU: NORMAL
BLOOD GROUP ANTIBODIES SERPL: NORMAL
BPU ID: NORMAL
BUN SERPL-MCNC: 29 MG/DL (ref 6–20)
BUN/CREAT SERPL: 8 (ref 12–20)
CA-I BLD-MCNC: 1.16 MMOL/L (ref 1.12–1.32)
CALCIUM SERPL-MCNC: 6.3 MG/DL (ref 8.5–10.1)
CHLORIDE BLD-SCNC: 94 MMOL/L (ref 98–107)
CHLORIDE SERPL-SCNC: 105 MMOL/L (ref 97–108)
CO2 BLD-SCNC: 27.3 MMOL/L (ref 21–32)
CO2 SERPL-SCNC: 24 MMOL/L (ref 21–32)
CREAT BLD-MCNC: 3.99 MG/DL (ref 0.6–1.3)
CREAT SERPL-MCNC: 3.45 MG/DL (ref 0.7–1.3)
CROSSMATCH RESULT,%XM: NORMAL
DIFFERENTIAL METHOD BLD: ABNORMAL
EOSINOPHIL # BLD: 0.2 K/UL (ref 0–0.4)
EOSINOPHIL NFR BLD: 3 % (ref 0–7)
ERYTHROCYTE [DISTWIDTH] IN BLOOD BY AUTOMATED COUNT: 19.8 % (ref 11.5–14.5)
GLUCOSE BLD STRIP.AUTO-MCNC: 140 MG/DL (ref 65–117)
GLUCOSE BLD STRIP.AUTO-MCNC: 181 MG/DL (ref 65–117)
GLUCOSE BLD STRIP.AUTO-MCNC: 99 MG/DL (ref 65–117)
GLUCOSE BLD-MCNC: 144 MG/DL (ref 65–100)
GLUCOSE SERPL-MCNC: 136 MG/DL (ref 65–100)
HCT VFR BLD AUTO: 24.7 % (ref 36.6–50.3)
HGB BLD-MCNC: 8.2 G/DL (ref 12.1–17)
IMM GRANULOCYTES # BLD AUTO: 0 K/UL (ref 0–0.04)
IMM GRANULOCYTES NFR BLD AUTO: 1 % (ref 0–0.5)
LYMPHOCYTES # BLD: 1.6 K/UL (ref 0.8–3.5)
LYMPHOCYTES NFR BLD: 21 % (ref 12–49)
MAGNESIUM SERPL-MCNC: 1.6 MG/DL (ref 1.6–2.4)
MCH RBC QN AUTO: 26.7 PG (ref 26–34)
MCHC RBC AUTO-ENTMCNC: 33.2 G/DL (ref 30–36.5)
MCV RBC AUTO: 80.5 FL (ref 80–99)
MONOCYTES # BLD: 0.7 K/UL (ref 0–1)
MONOCYTES NFR BLD: 9 % (ref 5–13)
NEUTS SEG # BLD: 5.2 K/UL (ref 1.8–8)
NEUTS SEG NFR BLD: 66 % (ref 32–75)
NRBC # BLD: 0.02 K/UL (ref 0–0.01)
NRBC BLD-RTO: 0.3 PER 100 WBC
PLATELET # BLD AUTO: 303 K/UL (ref 150–400)
PMV BLD AUTO: 9.4 FL (ref 8.9–12.9)
POTASSIUM BLD-SCNC: 4 MMOL/L (ref 3.5–5.1)
POTASSIUM SERPL-SCNC: 3.6 MMOL/L (ref 3.5–5.1)
RBC # BLD AUTO: 3.07 M/UL (ref 4.1–5.7)
SERVICE CMNT-IMP: ABNORMAL
SERVICE CMNT-IMP: NORMAL
SODIUM BLD-SCNC: 130 MMOL/L (ref 136–145)
SODIUM SERPL-SCNC: 137 MMOL/L (ref 136–145)
SPECIMEN EXP DATE BLD: NORMAL
STATUS OF UNIT,%ST: NORMAL
UNIT DIVISION, %UDIV: 0
WBC # BLD AUTO: 7.8 K/UL (ref 4.1–11.1)

## 2022-12-02 PROCEDURE — 74011000250 HC RX REV CODE- 250: Performed by: NURSE ANESTHETIST, CERTIFIED REGISTERED

## 2022-12-02 PROCEDURE — 36415 COLL VENOUS BLD VENIPUNCTURE: CPT

## 2022-12-02 PROCEDURE — 88341 IMHCHEM/IMCYTCHM EA ADD ANTB: CPT

## 2022-12-02 PROCEDURE — C9113 INJ PANTOPRAZOLE SODIUM, VIA: HCPCS | Performed by: INTERNAL MEDICINE

## 2022-12-02 PROCEDURE — 82040 ASSAY OF SERUM ALBUMIN: CPT

## 2022-12-02 PROCEDURE — 88342 IMHCHEM/IMCYTCHM 1ST ANTB: CPT

## 2022-12-02 PROCEDURE — 90935 HEMODIALYSIS ONE EVALUATION: CPT

## 2022-12-02 PROCEDURE — 65270000046 HC RM TELEMETRY

## 2022-12-02 PROCEDURE — 0DB58ZX EXCISION OF ESOPHAGUS, VIA NATURAL OR ARTIFICIAL OPENING ENDOSCOPIC, DIAGNOSTIC: ICD-10-PCS | Performed by: INTERNAL MEDICINE

## 2022-12-02 PROCEDURE — 74011250637 HC RX REV CODE- 250/637: Performed by: INTERNAL MEDICINE

## 2022-12-02 PROCEDURE — 74011250636 HC RX REV CODE- 250/636: Performed by: INTERNAL MEDICINE

## 2022-12-02 PROCEDURE — 82962 GLUCOSE BLOOD TEST: CPT

## 2022-12-02 PROCEDURE — 80047 BASIC METABLC PNL IONIZED CA: CPT

## 2022-12-02 PROCEDURE — 74011636637 HC RX REV CODE- 636/637: Performed by: INTERNAL MEDICINE

## 2022-12-02 PROCEDURE — 2709999900 HC NON-CHARGEABLE SUPPLY: Performed by: INTERNAL MEDICINE

## 2022-12-02 PROCEDURE — 74011000250 HC RX REV CODE- 250: Performed by: INTERNAL MEDICINE

## 2022-12-02 PROCEDURE — 88305 TISSUE EXAM BY PATHOLOGIST: CPT

## 2022-12-02 PROCEDURE — 74011250636 HC RX REV CODE- 250/636: Performed by: NURSE PRACTITIONER

## 2022-12-02 PROCEDURE — 80048 BASIC METABOLIC PNL TOTAL CA: CPT

## 2022-12-02 PROCEDURE — 76040000019: Performed by: INTERNAL MEDICINE

## 2022-12-02 PROCEDURE — 74011250637 HC RX REV CODE- 250/637: Performed by: PHYSICIAN ASSISTANT

## 2022-12-02 PROCEDURE — 76060000031 HC ANESTHESIA FIRST 0.5 HR: Performed by: INTERNAL MEDICINE

## 2022-12-02 PROCEDURE — 85025 COMPLETE CBC W/AUTO DIFF WBC: CPT

## 2022-12-02 PROCEDURE — 77030019988 HC FCPS ENDOSC DISP BSC -B: Performed by: INTERNAL MEDICINE

## 2022-12-02 PROCEDURE — 74011250636 HC RX REV CODE- 250/636: Performed by: NURSE ANESTHETIST, CERTIFIED REGISTERED

## 2022-12-02 PROCEDURE — 83735 ASSAY OF MAGNESIUM: CPT

## 2022-12-02 PROCEDURE — 77010033678 HC OXYGEN DAILY

## 2022-12-02 PROCEDURE — 74011250637 HC RX REV CODE- 250/637: Performed by: STUDENT IN AN ORGANIZED HEALTH CARE EDUCATION/TRAINING PROGRAM

## 2022-12-02 PROCEDURE — 88312 SPECIAL STAINS GROUP 1: CPT

## 2022-12-02 RX ORDER — FLUCONAZOLE 100 MG/1
100 TABLET ORAL DAILY
Status: DISCONTINUED | OUTPATIENT
Start: 2022-12-03 | End: 2022-12-06 | Stop reason: HOSPADM

## 2022-12-02 RX ORDER — FLUMAZENIL 0.1 MG/ML
0.2 INJECTION INTRAVENOUS
Status: CANCELLED | OUTPATIENT
Start: 2022-12-02 | End: 2022-12-02

## 2022-12-02 RX ORDER — ATROPINE SULFATE 0.1 MG/ML
0.5 INJECTION INTRAVENOUS
Status: CANCELLED | OUTPATIENT
Start: 2022-12-02 | End: 2022-12-03

## 2022-12-02 RX ORDER — MIDAZOLAM HYDROCHLORIDE 1 MG/ML
.25-5 INJECTION, SOLUTION INTRAMUSCULAR; INTRAVENOUS
Status: CANCELLED | OUTPATIENT
Start: 2022-12-02 | End: 2022-12-02

## 2022-12-02 RX ORDER — SODIUM CHLORIDE 9 MG/ML
10 INJECTION, SOLUTION INTRAVENOUS CONTINUOUS
Status: DISCONTINUED | OUTPATIENT
Start: 2022-12-02 | End: 2022-12-03

## 2022-12-02 RX ORDER — HEPARIN SODIUM 1000 [USP'U]/ML
1800 INJECTION, SOLUTION INTRAVENOUS; SUBCUTANEOUS
Status: DISCONTINUED | OUTPATIENT
Start: 2022-12-02 | End: 2022-12-06 | Stop reason: HOSPADM

## 2022-12-02 RX ORDER — DEXTROMETHORPHAN/PSEUDOEPHED 2.5-7.5/.8
1.2 DROPS ORAL
Status: CANCELLED | OUTPATIENT
Start: 2022-12-02

## 2022-12-02 RX ORDER — PROPOFOL 10 MG/ML
INJECTION, EMULSION INTRAVENOUS AS NEEDED
Status: DISCONTINUED | OUTPATIENT
Start: 2022-12-02 | End: 2022-12-02 | Stop reason: HOSPADM

## 2022-12-02 RX ORDER — LIDOCAINE HYDROCHLORIDE 20 MG/ML
INJECTION, SOLUTION EPIDURAL; INFILTRATION; INTRACAUDAL; PERINEURAL AS NEEDED
Status: DISCONTINUED | OUTPATIENT
Start: 2022-12-02 | End: 2022-12-02 | Stop reason: HOSPADM

## 2022-12-02 RX ORDER — OXYCODONE HYDROCHLORIDE 5 MG/1
5 TABLET ORAL
Status: DISCONTINUED | OUTPATIENT
Start: 2022-12-02 | End: 2022-12-06 | Stop reason: HOSPADM

## 2022-12-02 RX ORDER — DIPHENHYDRAMINE HYDROCHLORIDE 50 MG/ML
50 INJECTION, SOLUTION INTRAMUSCULAR; INTRAVENOUS ONCE
Status: CANCELLED | OUTPATIENT
Start: 2022-12-02 | End: 2022-12-02

## 2022-12-02 RX ORDER — GLYCOPYRROLATE 0.2 MG/ML
INJECTION INTRAMUSCULAR; INTRAVENOUS AS NEEDED
Status: DISCONTINUED | OUTPATIENT
Start: 2022-12-02 | End: 2022-12-02 | Stop reason: HOSPADM

## 2022-12-02 RX ORDER — NALOXONE HYDROCHLORIDE 0.4 MG/ML
0.4 INJECTION, SOLUTION INTRAMUSCULAR; INTRAVENOUS; SUBCUTANEOUS
Status: CANCELLED | OUTPATIENT
Start: 2022-12-02 | End: 2022-12-02

## 2022-12-02 RX ORDER — SODIUM CHLORIDE 9 MG/ML
125 INJECTION, SOLUTION INTRAVENOUS CONTINUOUS
Status: DISPENSED | OUTPATIENT
Start: 2022-12-02 | End: 2022-12-02

## 2022-12-02 RX ORDER — EPINEPHRINE 0.1 MG/ML
1 INJECTION INTRACARDIAC; INTRAVENOUS
Status: CANCELLED | OUTPATIENT
Start: 2022-12-02 | End: 2022-12-03

## 2022-12-02 RX ORDER — SODIUM CHLORIDE 9 MG/ML
INJECTION, SOLUTION INTRAVENOUS
Status: DISCONTINUED | OUTPATIENT
Start: 2022-12-02 | End: 2022-12-02 | Stop reason: HOSPADM

## 2022-12-02 RX ORDER — FLUCONAZOLE 200 MG/1
200 TABLET ORAL
Status: COMPLETED | OUTPATIENT
Start: 2022-12-02 | End: 2022-12-02

## 2022-12-02 RX ADMIN — PROPOFOL 50 MG: 10 INJECTION, EMULSION INTRAVENOUS at 10:49

## 2022-12-02 RX ADMIN — OXYCODONE 5 MG: 5 TABLET ORAL at 18:03

## 2022-12-02 RX ADMIN — FLUCONAZOLE 200 MG: 200 TABLET ORAL at 18:12

## 2022-12-02 RX ADMIN — PROPOFOL 25 MG: 10 INJECTION, EMULSION INTRAVENOUS at 11:01

## 2022-12-02 RX ADMIN — HYDRALAZINE HYDROCHLORIDE 100 MG: 50 TABLET, FILM COATED ORAL at 21:05

## 2022-12-02 RX ADMIN — SUCRALFATE 1 G: 1 TABLET ORAL at 21:05

## 2022-12-02 RX ADMIN — SUCRALFATE 1 G: 1 TABLET ORAL at 18:03

## 2022-12-02 RX ADMIN — SODIUM CHLORIDE 40 MG: 9 INJECTION, SOLUTION INTRAMUSCULAR; INTRAVENOUS; SUBCUTANEOUS at 21:05

## 2022-12-02 RX ADMIN — GLYCOPYRROLATE 0.2 MG: 0.2 INJECTION, SOLUTION INTRAMUSCULAR; INTRAVENOUS at 10:49

## 2022-12-02 RX ADMIN — CARVEDILOL 12.5 MG: 12.5 TABLET, FILM COATED ORAL at 08:17

## 2022-12-02 RX ADMIN — HYDROMORPHONE HYDROCHLORIDE 0.5 MG: 1 INJECTION, SOLUTION INTRAMUSCULAR; INTRAVENOUS; SUBCUTANEOUS at 08:14

## 2022-12-02 RX ADMIN — PROPOFOL 25 MG: 10 INJECTION, EMULSION INTRAVENOUS at 10:55

## 2022-12-02 RX ADMIN — SODIUM CHLORIDE: 0.9 INJECTION, SOLUTION INTRAVENOUS at 10:46

## 2022-12-02 RX ADMIN — HEPARIN SODIUM 1800 UNITS: 1000 INJECTION INTRAVENOUS; SUBCUTANEOUS at 17:13

## 2022-12-02 RX ADMIN — SODIUM CHLORIDE 40 MG: 9 INJECTION, SOLUTION INTRAMUSCULAR; INTRAVENOUS; SUBCUTANEOUS at 09:20

## 2022-12-02 RX ADMIN — Medication 8 UNITS: at 09:20

## 2022-12-02 RX ADMIN — IRON SUCROSE 200 MG: 20 INJECTION, SOLUTION INTRAVENOUS at 08:17

## 2022-12-02 RX ADMIN — CARVEDILOL 12.5 MG: 12.5 TABLET, FILM COATED ORAL at 18:03

## 2022-12-02 RX ADMIN — LIDOCAINE HYDROCHLORIDE 40 MG: 20 INJECTION, SOLUTION EPIDURAL; INFILTRATION; INTRACAUDAL; PERINEURAL at 10:49

## 2022-12-02 RX ADMIN — PREGABALIN 75 MG: 25 CAPSULE ORAL at 21:05

## 2022-12-02 RX ADMIN — PROPOFOL 25 MG: 10 INJECTION, EMULSION INTRAVENOUS at 10:58

## 2022-12-02 RX ADMIN — EPOETIN ALFA-EPBX 20000 UNITS: 10000 INJECTION, SOLUTION INTRAVENOUS; SUBCUTANEOUS at 22:54

## 2022-12-02 RX ADMIN — HYDROMORPHONE HYDROCHLORIDE 0.5 MG: 1 INJECTION, SOLUTION INTRAMUSCULAR; INTRAVENOUS; SUBCUTANEOUS at 00:23

## 2022-12-02 RX ADMIN — PROPOFOL 25 MG: 10 INJECTION, EMULSION INTRAVENOUS at 10:51

## 2022-12-02 RX ADMIN — ONDANSETRON 4 MG: 2 INJECTION INTRAMUSCULAR; INTRAVENOUS at 08:28

## 2022-12-02 RX ADMIN — HYDRALAZINE HYDROCHLORIDE 100 MG: 50 TABLET, FILM COATED ORAL at 18:03

## 2022-12-02 RX ADMIN — SODIUM CHLORIDE 10 ML/HR: 9 INJECTION, SOLUTION INTRAVENOUS at 09:57

## 2022-12-02 RX ADMIN — HYDROMORPHONE HYDROCHLORIDE 0.5 MG: 1 INJECTION, SOLUTION INTRAMUSCULAR; INTRAVENOUS; SUBCUTANEOUS at 13:44

## 2022-12-02 NOTE — ANESTHESIA POSTPROCEDURE EVALUATION
Procedure(s):  ESOPHAGOGASTRODUODENOSCOPY (EGD)  ESOPHAGOGASTRODUODENAL (EGD) BIOPSY. MAC    Anesthesia Post Evaluation        Patient location during evaluation: PACU  Note status: Adequate. Level of consciousness: responsive to verbal stimuli and sleepy but conscious  Pain management: satisfactory to patient  Airway patency: patent  Anesthetic complications: no  Cardiovascular status: acceptable  Respiratory status: acceptable  Hydration status: acceptable  Comments: +Post-Anesthesia Evaluation and Assessment    Patient: Wilmar Alonso MRN: [id number]  SSN: [id number]   YOB: 1980  Age: 38 y.o. Sex: male      Cardiovascular Function/Vital Signs    /86   Pulse 80   Temp 36.3 °C (97.3 °F)   Resp 17   Ht 5' 8\" (1.727 m)   Wt 83.9 kg (185 lb)   SpO2 95%   BMI 28.13 kg/m²     Patient is status post Procedure(s):  ESOPHAGOGASTRODUODENOSCOPY (EGD)  ESOPHAGOGASTRODUODENAL (EGD) BIOPSY. Nausea/Vomiting: Controlled. Postoperative hydration reviewed and adequate. Pain:  Pain Scale 1: Numeric (0 - 10) (12/02/22 1111)  Pain Intensity 1: 7 (12/02/22 1111)   Managed. Neurological Status: At baseline. Mental Status and Level of Consciousness: Arousable. Pulmonary Status:   O2 Device: None (Room air) (12/02/22 1111)   Adequate oxygenation and airway patent. Complications related to anesthesia: None    Post-anesthesia assessment completed. No concerns. Signed By: Susana Ramsey MD    12/2/2022  Post anesthesia nausea and vomiting:  controlled      INITIAL Post-op Vital signs:   Vitals Value Taken Time   /89 12/02/22 1130   Temp     Pulse 87 12/02/22 1130   Resp 16 12/02/22 1130   SpO2 97 % 12/02/22 1130   Vitals shown include unvalidated device data.

## 2022-12-02 NOTE — PROGRESS NOTES
TRANSFER - OUT REPORT:    Verbal report given to [facility](name) on [employer]  being transferred to [facility](unit) for routine progression of care       Report consisted of patients Situation, Background, Assessment and   Recommendations(SBAR). Information from the following report(s) SBAR, Procedure Summary, Intake/Output, MAR, Recent Results, and Procedure Verification was reviewed with the receiving nurse. Lines:   Peripheral IV 33/88/85 Left Basilic (Active)   Site Assessment Clean, dry, & intact 12/02/22 0800   Phlebitis Assessment 0 12/02/22 0800   Infiltration Assessment 0 12/02/22 0800   Dressing Status Clean, dry, & intact 12/02/22 0800   Dressing Type Tape;Transparent 12/02/22 0800   Hub Color/Line Status Green;Flushed;Patent 12/02/22 0800   Action Taken Open ports on tubing capped 12/02/22 0800   Alcohol Cap Used No 12/01/22 2041        Opportunity for questions and clarification was provided.       Patient transported with:   O2 @ 3 liters

## 2022-12-02 NOTE — ANESTHESIA PREPROCEDURE EVALUATION
Relevant Problems   CARDIOVASCULAR   (+) HTN (hypertension)      RENAL FAILURE   (+) FELECIA (acute kidney injury) ([facility] [address]   (+) Chronic kidney disease, stage 3a (HCC)   (+) Hydronephrosis of right kidney      ENDOCRINE   (+) DKA, type 1, not at goal [facility])       Anesthetic History   No history of anesthetic complications     Pertinent negatives: No PONV       Review of Systems / Medical History  Patient summary reviewed, nursing notes reviewed and pertinent labs reviewed    Pulmonary  Within defined limits                 Neuro/Psych     seizures: well controlled    Psychiatric history     Cardiovascular    Hypertension              Exercise tolerance: >4 METS  Comments: Pt states he had mild heart attack about two weeks ago at [facility]. Seen here October 2022, by cardiology with neg stress test and normal echo at that time.    GI/Hepatic/Renal         Renal disease: ESRD and dialysis       Endo/Other    Diabetes: type 1    Anemia     Other Findings   Comments: Upper GI bleed   recent DKA-Patient recently admitted to ICU on 11/20 at [facility] with a DKA and respiratory failure requiring intubation and mechanical ventilation, CRRT and pressors         Physical Exam    Airway  Mallampati: II  TM Distance: 4 - 6 cm  Neck ROM: normal range of motion   Mouth opening: Normal     Cardiovascular  Regular rate and rhythm,  S1 and S2 normal,  no murmur, click, rub, or gallop  Rhythm: regular  Rate: normal         Dental  No notable dental hx       Pulmonary  Breath sounds clear to auscultation               Abdominal  GI exam deferred       Other Findings            Anesthetic Plan    ASA: 4  Anesthesia type: MAC          Induction: Intravenous  Anesthetic plan and risks discussed with: Patient      Propofol MAC

## 2022-12-02 NOTE — PROGRESS NOTES
Received notification from bedside RN about patient with regards to: Ca 6.3  VS: /83, HR 73, RR 18, O2 sat 95% on NC 2 L    Intervention given: Albumin AOT ordered

## 2022-12-02 NOTE — PROGRESS NOTES
Hospitalist Progress Note    NAME: Marlys Granados   :  1980   MRN:  [id number]       Assessment / Plan:    DKA  Patient recently admitted to ICU on  at [facility] with a DKA and respiratory failure requiring intubation and mechanical ventilation, CRRT and pressors  Was initially on insulin drip  Anion gap closed bicarb normal  Lantus 8 unit uptitrate as tolerated   Diabetic management consult diabetic diet appreciate recs  Continue SSI    Discharge plan-8 units Lantus with 3 units lispro 3 times daily  With SSI    Anemia multifactorial  Anemia chronic kidney disease with esophagitis  Hemoglobin t continue to 7.2>6.7>8.9>8. 2  Will change to pantoprazole 40 IV twice daily  Status post 1 unit PRBC on   (Per discharge summary hemoglobin was around 7.4-7.6 at [facility])  GI recs appreciated  EGD -Severe grade D erosive esophagitis with candida plaques 26cm to EGJ at 41cm. -Biopsy pending  -Recommend empiric treatment of esophagus with fluconazole 200 mg daily then 100 mg from day to 2 total 14 days  Added sucralfate        ESRD on hemodialysis  Nephrology consulted  Continue MWF schedule  On sevelamer at home      Chest pain  Less likely ACS  Troponin slightly trended up to 200   oxycodone as needed  Patient had recent LHC early November not showing any significant CAD  Dilaudid as needed      HTN   - Will c/w home Norvasc, Coreg hydralazine     CAD  - Will continue with home asa, coreg     DM Neuropathy  - C/w home Cymbalta     BPH  - C/w home proscar, tamsulosin     Hypocalcemia        25.0 - 29.9 Overweight / Body mass index is 28.13 kg/m². Estimated discharge date: December 3  Barriers:Gi clearance, hb stability    Code status: Full  Prophylaxis: scd  Recommended Disposition: Home w/Family     Subjective:     Chief Complaint / Reason for Physician Visit  \" Currently comfortable no new complaints. Denies any abdominal pain nausea vomiting. Discussed with RN events overnight.      Review of Systems: All pertinent positive as above all other systems negative  Symptom Y/N Comments  Symptom Y/N Comments   Fever/Chills    Chest Pain     Poor Appetite    Edema     Cough    Abdominal Pain     Sputum    Joint Pain     SOB/JOHNSTON    Pruritis/Rash     Nausea/vomit    Tolerating PT/OT     Diarrhea    Tolerating Diet     Constipation    Other       Could NOT obtain due to:      Objective:     VITALS:   Last 24hrs VS reviewed since prior progress note. Most recent are:  Patient Vitals for the past 24 hrs:   Temp Pulse Resp BP SpO2   12/02/22 1430 -- 77 16 (!) 155/85 --   12/02/22 1415 -- 80 16 (!) 178/88 --   12/02/22 1404 97.5 °F (36.4 °C) 80 16 (!) 171/90 --   12/02/22 1136 -- 87 16 (!) 183/91 97 %   12/02/22 1133 -- 87 13 (!) 177/88 98 %   12/02/22 1130 -- 86 12 (!) 169/89 98 %   12/02/22 1127 -- 84 13 (!) 166/84 98 %   12/02/22 1124 -- 84 12 (!) 162/85 98 %   12/02/22 1121 97.8 °F (36.6 °C) 81 11 131/70 96 %   12/02/22 1111 -- 80 17 135/86 95 %   12/02/22 1110 -- 80 12 135/86 97 %   12/02/22 1002 -- -- -- -- 97 %   12/02/22 0743 97.3 °F (36.3 °C) 82 18 (!) 163/92 94 %   12/02/22 0000 98.2 °F (36.8 °C) 73 18 133/83 95 %   12/01/22 1915 97.5 °F (36.4 °C) 75 18 121/80 91 %   12/01/22 1445 97.6 °F (36.4 °C) 77 18 (!) 142/79 --         Intake/Output Summary (Last 24 hours) at 12/2/2022 1439  Last data filed at 12/2/2022 1130  Gross per 24 hour   Intake 690 ml   Output 725 ml   Net -35 ml          I had a face to face encounter and independently examined this patient on 12/2/2022, as outlined below:  PHYSICAL EXAM:  General: WD, WN. Alert, cooperative, no acute distress    EENT:  EOMI. Anicteric sclerae. MMM  Resp:  CTA bilaterally, no wheezing or rales. No accessory muscle use  CV:  Regular  rhythm,  No edema  GI:  Soft, Non distended, Non tender. +Bowel sounds  Neurologic:  Alert and oriented X 3, normal speech,   Psych:   Good insight. Not anxious nor agitated  Skin:  No rashes.   No jaundice    Reviewed most current lab test results and cultures  YES  Reviewed most current radiology test results   YES  Review and summation of old records today    NO  Reviewed patient's current orders and MAR    YES  PMH/SH reviewed - no change compared to H&P  ________________________________________________________________________  Care Plan discussed with:    Comments   Patient x    Family      RN x    Care Manager     Consultant                        Multidiciplinary team rounds were held today with , nursing, pharmacist and clinical coordinator. Patient's plan of care was discussed; medications were reviewed and discharge planning was addressed. ________________________________________________________________________  Total NON critical care TIME:  37 Minutes    Total CRITICAL CARE TIME Spent:   Minutes non procedure based      Comments   >50% of visit spent in counseling and coordination of care x    ________________________________________________________________________  Fatmata Dawson MD     Procedures: see electronic medical records for all procedures/Xrays and details which were not copied into this note but were reviewed prior to creation of Plan. LABS:  I reviewed today's most current labs and imaging studies.   Pertinent labs include:  Recent Labs     12/02/22 0416 12/01/22  1143 12/01/22  0219 11/30/22  0211   WBC 7.8  --  14.6* 11.7*   HGB 8.2* 8.9* 6.7* 7.2*   HCT 24.7* 26.6* 19.7* 21.2*     --  320 294       Recent Labs     12/02/22  0416 12/01/22  0219 11/30/22  1304 11/30/22  0211 11/29/22 2048 11/29/22  1855 11/29/22  1855    134* 131* 124* 117*  --  115*   K 3.6 3.8 4.5 3.5 4.1  --  4.0    100 98 92* 83*  --  80*   CO2 24 29 24 21 14*  --  12*   * 137* 123* 560* 1,013*  --  1,181*   BUN 29* 27* 66* 65* 64*  --  65*   CREA 3.45* 3.12* 5.57* 5.83* 5.95*  --  6.11*   CA 6.3* 7.4* 6.9* 7.4* 7.7*  --  7.8*   MG 1.6 1.8 1.7 1.7 2.0   < >  --    PHOS  --   --   --   --  6.0*  --   -- ALB 1.2*  --   --  1.2*  --   --  1.5*   TBILI  --   --   --  0.4  --   --  0.3   ALT  --   --   --  19  --   --  26    < > = values in this interval not displayed.          Signed: Huber Alvares MD

## 2022-12-02 NOTE — PROGRESS NOTES
Seen / examined in endoscopy. No further bleeding. Exam of respiratory, cardiovascular exam, and abdominal exam unchanged from yesterday. Proceed with EGD.

## 2022-12-02 NOTE — PROGRESS NOTES
Allison Diaz  1980  [id number]    Situation:  Verbal report received from: Catalina Kraft RN  Procedure: Procedure(s):  ESOPHAGOGASTRODUODENOSCOPY (EGD)  ESOPHAGOGASTRODUODENAL (EGD) BIOPSY    Background:    Preoperative diagnosis: vomited blood  Postoperative diagnosis: severe esophagitis    :  Dr. Bejna Carmona  Assistant(s): Endoscopy RN-2: Claudia Cheney    Specimens:   ID Type Source Tests Collected by Time Destination   1 : esophagus bx Preservative   Delmy Culver MD 12/2/2022 1058 Pathology     H. Pylori  no    Assessment:  Intra-procedure medications     Anesthesia gave intra-procedure sedation and medications, see anesthesia flow sheet yes    Intravenous fluids: NS@ KVO     Vital signs stable yes    Abdominal assessment: round and soft yes    Recommendation:  Discharge patient per MD order no.   Return to floor Dialysis then room   Family or Friend Khadijah, aliza  Permission to share finding with family or friend yes

## 2022-12-02 NOTE — PROGRESS NOTES
End of Shift Note    Bedside shift change report given to Alexandria  (oncoming nurse) by Gabrielle Love RN (offgoing nurse). Report included the following information SBAR, Kardex, ED Summary, OR Summary, Procedure Summary, Intake/Output, MAR, Accordion, Recent Results, Med Rec Status, and Cardiac Rhythm NSR    Shift worked:  7a-7p     Shift summary and any significant changes:     1u PRBC received this morning. H/h recheck hgb 8.9. Consent needs to be signed for EGD in AM. Pt NPO after midnight. Pt has chronic chest pain, prn iv dilaudid. Hemodialysis in AM. No N/V or BM today. Needs hemoccult. Concerns for physician to address: none     Zone phone for oncoming shift:     [phone]     Activity:  Activity Level: Up with Assistance  Number times ambulated in hallways past shift: 0  Number of times OOB to chair past shift: 0    Cardiac:   Cardiac Monitoring: Yes      Cardiac Rhythm: Sinus Rhythm    Access:  Current line(s): PIV     Genitourinary:   Urinary status: oliguric    Respiratory:   O2 Device: Nasal cannula  Chronic home O2 use?: NO  Incentive spirometer at bedside: NO       GI:  Last Bowel Movement Date: 11/30/22  Current diet:  ADULT DIET Regular; 4 carb choices (60 gm/meal); Low Potassium (Less than 3000 mg/day)  DIET NPO  Passing flatus: YES  Tolerating current diet: YES       Pain Management:   Patient states pain is manageable on current regimen: YES    Skin:  Corey Score: 19  Interventions: increase time out of bed, limit briefs, and nutritional support     Patient Safety:  Fall Score:  Total Score: 3  Interventions: bed/chair alarm, assistive device (walker, cane, etc), gripper socks, and pt to call before getting OOB  High Fall Risk: Yes    Length of Stay:  Expected LOS: 3d 19h  Actual LOS: 2      Brandon Liriano RN

## 2022-12-02 NOTE — PROGRESS NOTES
Physical Therapy    Chart reviewed up to date. Attempted to see pt this AM for PT session. Pt KAJAL for procedure. Will defer and continue to follow.     Ced Breen PT, DPT, NCS

## 2022-12-02 NOTE — PROGRESS NOTES
Verbal shift change report given to Yoanna Currie RN (oncoming nurse) by Paige Torres RN (offgoing nurse). Report included the following information SBAR, Kardex, Intake/Output, MAR, Recent Results, and Cardiac Rhythm SR . Patient still in Dialysis Suite. 5:18PM- TRANSFER - IN REPORT:    Verbal report received from Dialysis, RN(name) on Mike Dorsey  being received from Dialysis(unit) for routine progression of care . 2L of fluids removed during dialysis. Report consisted of patients Situation, Background, Assessment and   Recommendations(SBAR). Information from the following report(s) SBAR, Kardex, Procedure Summary, Intake/Output, and Cardiac Rhythm SR  was reviewed with the receiving nurse. Opportunity for questions and clarification was provided. 5:44PM- Assessment completed upon patients arrival to unit and care assumed. End of Shift Note    Bedside shift change report given to Irlanda Rosales RN (oncoming nurse) by Rambo Woods RN (offgoing nurse). Report included the following information SBAR, Kardex, Procedure Summary, Intake/Output, MAR, Recent Results, and Cardiac Rhythm SR    Shift worked:  5:44PM-7PM     Shift summary and any significant changes:     Dressing changed on HD access, tolerated well. PRN Roxicodone x 1 given. BP elevated on arrival, due medications given, then BP improved. Concerns for physician to address:       Zone phone for oncoming shift:          Activity:  Activity Level:  Up with Assistance  Number times ambulated in hallways past shift: 0  Number of times OOB to chair past shift: 0    Cardiac:   Cardiac Monitoring: Yes      Cardiac Rhythm: Sinus Rhythm    Access:  Current line(s): PIV and HD access     Genitourinary:   Urinary status: voiding    Respiratory:   O2 Device: Nasal cannula  Chronic home O2 use?: NO  Incentive spirometer at bedside: NO       GI:  Last Bowel Movement Date: 11/30/22  Current diet:  ADULT DIET Regular; 4 carb choices (60 gm/meal); Low Potassium (Less than 3000 mg/day)  DIET ONE TIME MESSAGE  DIET ONE TIME MESSAGE  Passing flatus: YES  Tolerating current diet: YES       Pain Management:   Patient states pain is manageable on current regimen: YES    Skin:  Corey Score: 19  Interventions: increase time out of bed    Patient Safety:  Fall Score:  Total Score: 3  Interventions: gripper socks, pt to call before getting OOB, and stay with me (per policy)  High Fall Risk: Yes    Length of Stay:  Expected LOS: 3d 19h  Actual LOS: 3      Dock ASAD Ely

## 2022-12-02 NOTE — PROGRESS NOTES
Occupational Therapy  12/2/2022    Chart reviewed and attempt for therapy. Pt KAJAL for procedure this morning. Will continue to follow.      Thank you,   Tulio Helton, OTR/L

## 2022-12-02 NOTE — DIABETES MGMT
[facility]  DIABETES MANAGEMENT CONSULT    Consulted by Provider for advanced nursing evaluation and care for inpatient blood glucose management. Evaluation and Action Plan   This 38year old male patient with Type 1 diabetes did not achieve BG control prior to admission as evidenced by an A1c of 10.1%. The patient was using an insulin pump but it was removed after a recent admission at [address] [place] [address] per patient. The patient was told to not resume the insulin pump until he saw his endocrinologist. The patient was started on glucostabilizer for DKA during this admission. The patient was transition off using 8 units Lantus daily. The patient has Type 1 diabetes and will require the addition of meal time insulin once he is eating consistently. He is not eating much today and will be NPO after midnight for an upper endoscopy tomorrow. In correspondence with the patient's endocrinologist [facility]), she does not endorse the use of an insulin pimp for this patient. The patient should be discharged on basal/bolus regimen. The patients' BG's are labile. Consider using 8 units Lantus daily and 3 units Humalog with each meal. The patient should follow-up with Dr. Haywood Gowers soon for future diabetes management. Management Rationale Action Plan   Medication   Basal needs Using 8 units/kg/D based on 0.1xkg Continue 8 units Lantus for now. The patient will require the addition of meal time insulin once eating. Nutritional needs     Corrective insulin Using high sensitivity          Referral []        Outpatient diabetes education  [] Behavioral health  [] Inpatient nutrition services  [] Pharmacy services          Initial Presentation   Yajaira Taylor is a 38 y.o. male admitted from home on 11/29/2022 after experiencing nausea, vomiting and lethargy. LAB: Glucose 1,181. Creatine 6. 11. GFR 11. Anion Gap 23.  A1c 10.1%  CXR:  CT:  MRI:    HX:   Past Medical History:   Diagnosis Date    Bipolar 1 disorder, depressed ([facility]     Bipolar disorder ([facility]     Ch[facility], [facility]a [facility]     Depression     Diabetes ([facility]     DKA, type 1 ([facility] 1/27/2013    diagnosed age 23    DKA, type 1 ([facility]     H/O noncompliance with medical treatment, presenting hazards to health     MRSA (methicillin resistant staph aureus) culture positive     MRSA (methicillin resistant Staphylococcus aureus)     Face    Noncompliance with medication regimen     Second hand smoke exposure     Seizure ([facility]     Seizures ([facility] 2006 or 2007    one episode during long term        INITIAL DX:   DKA (diabetic ketoacidosis) ([facility] [E11.10]     Current Treatment     TX: BP management. Insulin. Protonix. Lyrica. Cymbalta. Hospital Course   Clinical progress has been uncomplicated. Diabetes History   The patient reports a 20 year history of Type 1 diabetes. He has a positive family hx of diabetes (mom & niece). He states today that he sees Dr. Oliver Platt (endocrinologist) for his diabetes. He was recently started on a Tandem insulin pump on 07/15/22. Unsure of which CGM is in use.  The patient had a recent admission at [facility] and was reportedly told to not resume the insulin pump until he is evaluated by his endocrinologist.        Diabetes-related Medical History  Acute complications  DKA  Neurological complications  Gastroparesis and Peripheral neuropathy  Microvascular disease  Nephropathy  Macrovascular disease  Myocardial infarction      Diabetes Medication History  Key Antihyperglycemic Medications               insulin glargine (LANTUS) 100 unit/mL injection Take 10 units once daily at night    insulin lispro (HUMALOG) 100 unit/mL injection INITIATE CORRECTIVE INSULIN PROTOCOL (FREDA): High Sensitivity (thin, ESRD): AC (before meals), Q6H CORRECTIONAL SCALE only For Blood Sugar (mg/dl) of :           140-199=0 units          200-249=2 units 250-299=3 units 300-349=4 units 350-400=6 units 401 or greater = Call MD Give in addition to basal medications. Do Not Hold for NPO BEDTIME CORRECTIONAL sliding scale when scheduled: 200-249=1 units 250-349=2 units 350-400=3 units 401 or greater = Call MD Give in addition to basal medications. Do Not Hold for NPO Fast Acting - Administer Immediately - or within 15 minutes of start of meal, if mealtime coverage. Diabetes self-management practices:   Eating pattern Deferred                   Physical activity pattern              [x]        Not employing a physical activity program to control BG     Monitoring pattern              [x]        Testing BGs sufficiently to inform self-management adjustments                ( Using Dexcom G6)  Taking medications pattern ( not using insulin pump at this time)  [x]        Affordable  Social determinants of health impacting diabetes self-management practices   Concerned that you need to know more about how to stay healthy with diabetes  Overall evaluation:               [x]        Unknown if achieving A1c target with drug therapy & self-care practices      Subjective   Patient off the floor for procedure     Objective   Physical exam  General Normal weight male in no acute distress. Conversant and cooperative  Neuro  Alert, oriented   Vital Signs Visit Vitals  BP (!) 171/90   Pulse 80   Temp 97.5 °F (36.4 °C) (Oral)   Resp 16   Ht 5' 8\" (1.727 m)   Wt 83.9 kg (185 lb)   SpO2 97%   BMI 28.13 kg/m²         Laboratory  Recent Labs     12/02/22  0416 12/01/22  0219 11/30/22  1304 11/30/22  0211 11/29/22  2048 11/29/22  1855   * 137* 123* 560*   < > 1,181*   AGAP 8 5 9 11   < > 23*   WBC 7.8 14.6*  --  11.7*  --  15.2*   CREA 3.45* 3.12* 5.57* 5.83*   < > 6.11*   AST  --   --   --  13*  --  17   ALT  --   --   --  19  --  26    < > = values in this interval not displayed. Factors impacting BG management  Factor Dose Comments   Nutrition:  Standard meals     60 grams/meal     Not eating each. Will be NPO after midnight.     Pain PRN Dilaudid    Other: Kidney function       CKD   Hemodialysis patient     Blood glucose pattern    Significant diabetes-related events over the past 24-72 hours      Assessment and Nursing Intervention   Nursing Diagnosis Risk for unstable blood glucose pattern   Nursing Intervention Domain 3517 Decision-making Support   Nursing Interventions Examined current inpatient diabetes/blood glucose control   Explored factors facilitating and impeding inpatient management  Explored corrective strategies with patient and responsible inpatient provider   Informed patient of rational for insulin strategy while hospitalized           ANEESH Soriano  Diabetes Clinical Nurse Specialist  [facility]  Access via [address] [place] [address]

## 2022-12-02 NOTE — PERIOP NOTES
Endoscopy Case End Note:    1055:  Procedure scope was pre-cleaned, per protocol, at bedside by Luis Enrique Griffin RN.      1110:  Report received from anesthesia - Andrew Dave CRNA. See anesthesia flowsheet for intra-procedure vital signs and events.

## 2022-12-02 NOTE — PROGRESS NOTES
Barrington Batista,    Unfortunately I do not recommend he resume the pump. Georgi Riedel has had DKA 7-8x since starting the pump. He has never come to any follow up appointments aside from our first meeting. All 4 of my [employer] partners Tenisha Mcnair MD, Torrie Wilknis MD, Shilpa Mclaughlin MD, and Rodrigo Elliott MD) have had to attempt to help him with him pump and we have all concluded that he is not fit to use a pump due to insertion site issues/inability to control the pump to avoid DKA. We recommend he use lantus and lispro injections to ensure the insulin is delivered given his inability to use the pump in a responsible way. Morgan Madsen MD  [facility]       ===View-only below this line===  ----- Message -----  From: ANEESH Panda  Sent: 12/1/2022   3:37 PM EST  To: Marquez Brar MD    The patient is hospitalized. The patient has attempted to call the office regarding  guidance on resuming the insulin pump.

## 2022-12-02 NOTE — PROCEDURES
NAME:  Demond Zhou   :   1980   MRN:   [id number]     Date/Time:  2022 10:57 AM    Esophagogastroduodenoscopy (EGD) Procedure Note    : Chloe Dawkins MD    Staff: Endoscopy RN-2: Guerline Zheng     Referring Provider:  Nan Parsons MD    Anethesia/Sedation:  MAC anesthesia Propofol    Preoperative diagnosis: vomited blood    Postoperative diagnosis: * No post-op diagnosis entered *    Procedure Details     After infom consent was obtained for the procedure, with all risks and benefits of procedure explained the patient was taken to the endoscopy suite and placed in the left lateral decubitus position. Following sequential administration of sedation as per above, the [id number] gastroscope was inserted into the mouth and advanced under direct vision to second portion of the duodenum. A careful inspection was made as the gastroscope was withdrawn, including a retroflexed view of the proximal stomach; findings and interventions are described below. Findings:  Esophagus: Severe grade D erosive esophagitis with candida plaques 26cm to EGJ at 41cm. No blood or bleeding appreciated in esophagus. Multiple cold forceps biopsies were obtained of mid esophagus with minimal bleeding to r/o CMV, HSV, dysplasia, malignancy, candida. Stomach:normal. No blood or ulcers. Duodenum/jejunum:normal           EBL: minimal    Complications:   Hypoxemia at the end of procedure quickly responded. See ANES note. Impression:    See Postoperative diagnosis above    Recommendations:  -Continue acid suppression. , -Await pathology. - recommend empiric treatment of esophagus with fluconazole 200mg day 1, 100mg day 2-14. To be ordered by primary team in case other medication interactions. - ok to eat diabetic diet.   - attempted to call Dennis Montalvo [phone] no answer    Discharge disposition:  [facility]    Héctor Villela MD

## 2022-12-02 NOTE — PROGRESS NOTES
0700: Bedside shift change report given to Justin Neff, [facility] (oncoming nurse) by Bushra Umana RN (offgoing nurse). Report included the following information SBAR. 1040: Patient KAJAL going to EGD.

## 2022-12-02 NOTE — PROCEDURES
Hemodialysis / [phone]    Vitals Pre Post Assessment Pre Post   BP BP: (!) 171/90 (12/02/22 1404)   181/98 LOC A&Ox4 A&Ox4   HR Pulse (Heart Rate): 80 (12/02/22 1404) 82 Lungs clear clear   Resp Resp Rate: 16 (12/02/22 1404) 16 Cardiac RRR RRR   Temp Temp: 97.5 °F (36.4 °C) (12/02/22 1404) 97.6 Skin CDI CDI   Weight    Edema Generalized Generalized   Tele status Remote tele Remote tele Pain Pain Intensity 1: 0 (12/02/22 1136) 0     Orders   Duration: Start: 0473 End: 1416 Total: 3.25hrs   Dialyzer: Dialyzer/Set Up Inspection: Revaclear (12/02/22 1404)   K Bath: Dialysate K (mEq/L): 3 (12/02/22 1404)   Ca Bath: Dialysate CA (mEq/L): 2.5 (12/02/22 1404)   Na: Dialysate NA (mEq/L): 140 (12/02/22 1404)   Bicarb: Dialysate HCO3 (mEq/L): 40 (12/02/22 1404)   Target Fluid Removal: Goal/Amount of Fluid to Remove (mL): 2000 mL (12/02/22 1404)     Access   Type & Location: R PC : Pre- Assessment: Dressing dated 11/21/22 CDI. No s/s of infection. Both lumens aspirate & flush well. Running well at . SBAR received from Primary RN. Pt arrived to HD suite A&Ox4. Consent signed & on file OR Chronic consent applies. Each catheter limb disinfected per p&p, caps removed, hubs disinfected per p&p. Each lumen aspirated for blood return and flushed with Normal Saline per policy. VSS. Dialysis Tx initiated. Comments:   Dressing dated 11/21/22 CDI. No s/s of infection noted.                                      Labs   HBsAg (Antigen) / date: Neg 11/16/22                                              HBsAb (Antibody) / date: Susc 11/16/22   Source: [facility]   Obtained/Reviewed  Critical Results Called HGB   Date Value Ref Range Status   12/02/2022 8.2 (L) 12.1 - 17.0 g/dL Final     Potassium   Date Value Ref Range Status   12/02/2022 3.6 3.5 - 5.1 mmol/L Final     Calcium   Date Value Ref Range Status   12/02/2022 6.3 (LL) 8.5 - 10.1 MG/DL Final     Comment:     RESULTS VERIFIED, PHONED TO AND READ BACK BY  Aleksey Muñoz RN AT 0534/TCB       BUN   Date Value Ref Range Status   12/02/2022 29 (H) 6 - 20 MG/DL Final     Creatinine   Date Value Ref Range Status   12/02/2022 3.45 (H) 0.70 - 1.30 MG/DL Final        Meds Given   Name Dose Route   Heparin 1:1000 1.8 [email or web address]   Heparin 1:1000 1.8 [email or web address]          Adequacy / Fluid    Total Liters Process: 69.7   Net Fluid Removed: 2000mL      Comments   Time Out Done:   (Time) 1358   Admitting Diagnosis: DKA   Consent obtained/signed: Informed Consent Verified: Yes (12/02/22 1954)   Machine / Adriel Clarity # Machine Number: Antonio Ellis (12/02/22 1344)   Primary Nurse Rpt Pre: Temi Harris RN   Primary Nurse Rpt Post: Rambo Woods RN   Pt Education: procedural   Care Plan: On going   Pts outpatient [facility]: [facility]     Tx Summary  7718:  R PC : Pre- Assessment: Dressing dated 11/21/22 CDI. No s/s of infection. Both lumens aspirate & flush well. Running well at . SBAR received from Primary RN. Pt arrived to HD suite A&Ox4. Consent signed & on file OR Chronic consent applies. Each catheter limb disinfected per p&p, caps removed, hubs disinfected per p&p. Each lumen aspirated for blood return and flushed with Normal Saline per policy. VSS. Dialysis Tx initiated. **Pt tolerated tx well. No issues or complaints voiced. **    1725: Tx ended. VSS. Each dialysis catheter limb disinfected per p&p, all possible blood returned per p&p, and each dialysis hub disinfected per p&p. Each lumen flushed, post dialysis catheter Heparin dwell instilled per order, and caps applied. Bed locked and in the lowest position, call bell and belongings in reach. SBAR given to Primary, RN. Patient is stable at time of their/ my departure. Post assessment: Dressing CDI. No changes. All Dialysis related medications have been reviewed. Comments:   Assessment performed by RN. Procedure and documentation observed and reviewed by Kaia Alonso RN.

## 2022-12-03 ENCOUNTER — APPOINTMENT (OUTPATIENT)
Dept: GENERAL RADIOLOGY | Age: 42
DRG: 420 | End: 2022-12-03
Attending: INTERNAL MEDICINE
Payer: MEDICAID

## 2022-12-03 LAB
ANION GAP SERPL CALC-SCNC: 4 MMOL/L (ref 5–15)
BASOPHILS # BLD: 0 K/UL (ref 0–0.1)
BASOPHILS NFR BLD: 0 % (ref 0–1)
BUN SERPL-MCNC: 17 MG/DL (ref 6–20)
BUN/CREAT SERPL: 6 (ref 12–20)
CALCIUM SERPL-MCNC: 7.5 MG/DL (ref 8.5–10.1)
CHLORIDE SERPL-SCNC: 97 MMOL/L (ref 97–108)
CO2 SERPL-SCNC: 31 MMOL/L (ref 21–32)
COMMENT, HOLDF: NORMAL
CREAT SERPL-MCNC: 3.01 MG/DL (ref 0.7–1.3)
DIFFERENTIAL METHOD BLD: ABNORMAL
EOSINOPHIL # BLD: 0.2 K/UL (ref 0–0.4)
EOSINOPHIL NFR BLD: 2 % (ref 0–7)
ERYTHROCYTE [DISTWIDTH] IN BLOOD BY AUTOMATED COUNT: 19.6 % (ref 11.5–14.5)
GLUCOSE BLD STRIP.AUTO-MCNC: 172 MG/DL (ref 65–117)
GLUCOSE BLD STRIP.AUTO-MCNC: 195 MG/DL (ref 65–117)
GLUCOSE BLD STRIP.AUTO-MCNC: 275 MG/DL (ref 65–117)
GLUCOSE BLD STRIP.AUTO-MCNC: 283 MG/DL (ref 65–117)
GLUCOSE SERPL-MCNC: 220 MG/DL (ref 65–100)
HCT VFR BLD AUTO: 24.4 % (ref 36.6–50.3)
HGB BLD-MCNC: 7.9 G/DL (ref 12.1–17)
IMM GRANULOCYTES # BLD AUTO: 0.1 K/UL (ref 0–0.04)
IMM GRANULOCYTES NFR BLD AUTO: 1 % (ref 0–0.5)
LYMPHOCYTES # BLD: 1 K/UL (ref 0.8–3.5)
LYMPHOCYTES NFR BLD: 12 % (ref 12–49)
MAGNESIUM SERPL-MCNC: 1.7 MG/DL (ref 1.6–2.4)
MAGNESIUM SERPL-MCNC: 1.8 MG/DL (ref 1.6–2.4)
MCH RBC QN AUTO: 26.9 PG (ref 26–34)
MCHC RBC AUTO-ENTMCNC: 32.4 G/DL (ref 30–36.5)
MCV RBC AUTO: 83 FL (ref 80–99)
MONOCYTES # BLD: 0.5 K/UL (ref 0–1)
MONOCYTES NFR BLD: 5 % (ref 5–13)
NEUTS SEG # BLD: 7.1 K/UL (ref 1.8–8)
NEUTS SEG NFR BLD: 80 % (ref 32–75)
NRBC # BLD: 0.02 K/UL (ref 0–0.01)
NRBC BLD-RTO: 0.2 PER 100 WBC
PLATELET # BLD AUTO: 329 K/UL (ref 150–400)
PMV BLD AUTO: 9.6 FL (ref 8.9–12.9)
POTASSIUM SERPL-SCNC: 3.8 MMOL/L (ref 3.5–5.1)
PROCALCITONIN SERPL-MCNC: 3.44 NG/ML
RBC # BLD AUTO: 2.94 M/UL (ref 4.1–5.7)
SAMPLES BEING HELD,HOLD: NORMAL
SERVICE CMNT-IMP: ABNORMAL
SODIUM SERPL-SCNC: 132 MMOL/L (ref 136–145)
WBC # BLD AUTO: 8.9 K/UL (ref 4.1–11.1)

## 2022-12-03 PROCEDURE — 74011250636 HC RX REV CODE- 250/636: Performed by: INTERNAL MEDICINE

## 2022-12-03 PROCEDURE — 80048 BASIC METABOLIC PNL TOTAL CA: CPT

## 2022-12-03 PROCEDURE — 74011250637 HC RX REV CODE- 250/637: Performed by: PHYSICIAN ASSISTANT

## 2022-12-03 PROCEDURE — 71045 X-RAY EXAM CHEST 1 VIEW: CPT

## 2022-12-03 PROCEDURE — 74011000250 HC RX REV CODE- 250: Performed by: INTERNAL MEDICINE

## 2022-12-03 PROCEDURE — 84145 PROCALCITONIN (PCT): CPT

## 2022-12-03 PROCEDURE — 74011250637 HC RX REV CODE- 250/637: Performed by: INTERNAL MEDICINE

## 2022-12-03 PROCEDURE — 82962 GLUCOSE BLOOD TEST: CPT

## 2022-12-03 PROCEDURE — 65270000046 HC RM TELEMETRY

## 2022-12-03 PROCEDURE — 77010033678 HC OXYGEN DAILY

## 2022-12-03 PROCEDURE — 74011250637 HC RX REV CODE- 250/637: Performed by: STUDENT IN AN ORGANIZED HEALTH CARE EDUCATION/TRAINING PROGRAM

## 2022-12-03 PROCEDURE — C9113 INJ PANTOPRAZOLE SODIUM, VIA: HCPCS | Performed by: INTERNAL MEDICINE

## 2022-12-03 PROCEDURE — 83735 ASSAY OF MAGNESIUM: CPT

## 2022-12-03 PROCEDURE — 85025 COMPLETE CBC W/AUTO DIFF WBC: CPT

## 2022-12-03 PROCEDURE — 74011636637 HC RX REV CODE- 636/637: Performed by: INTERNAL MEDICINE

## 2022-12-03 PROCEDURE — 36415 COLL VENOUS BLD VENIPUNCTURE: CPT

## 2022-12-03 RX ORDER — INSULIN GLARGINE 100 [IU]/ML
INJECTION, SOLUTION SUBCUTANEOUS
Qty: 1 ML | Refills: 0 | Status: SHIPPED | OUTPATIENT
Start: 2022-12-03 | End: 2023-02-03

## 2022-12-03 RX ORDER — CLONIDINE HYDROCHLORIDE 0.1 MG/1
0.2 TABLET ORAL
Status: DISCONTINUED | OUTPATIENT
Start: 2022-12-03 | End: 2022-12-06 | Stop reason: HOSPADM

## 2022-12-03 RX ORDER — SUCRALFATE 1 G/1
1 TABLET ORAL
Qty: 120 TABLET | Refills: 0 | Status: SHIPPED | OUTPATIENT
Start: 2022-12-03 | End: 2023-02-03

## 2022-12-03 RX ORDER — FLUCONAZOLE 100 MG/1
100 TABLET ORAL DAILY
Qty: 12 TABLET | Refills: 0 | Status: SHIPPED | OUTPATIENT
Start: 2022-12-04 | End: 2022-12-06 | Stop reason: SDUPTHER

## 2022-12-03 RX ORDER — LOSARTAN POTASSIUM 25 MG/1
50 TABLET ORAL DAILY
Status: DISCONTINUED | OUTPATIENT
Start: 2022-12-03 | End: 2022-12-06 | Stop reason: HOSPADM

## 2022-12-03 RX ORDER — FUROSEMIDE 40 MG/1
80 TABLET ORAL DAILY
Qty: 30 TABLET | Refills: 0 | Status: SHIPPED | OUTPATIENT
Start: 2022-12-03

## 2022-12-03 RX ORDER — LOSARTAN POTASSIUM 50 MG/1
50 TABLET ORAL DAILY
Qty: 30 TABLET | Refills: 0 | Status: SHIPPED | OUTPATIENT
Start: 2022-12-03

## 2022-12-03 RX ORDER — PANTOPRAZOLE SODIUM 40 MG/1
40 TABLET, DELAYED RELEASE ORAL 2 TIMES DAILY
Qty: 120 TABLET | Refills: 0 | Status: SHIPPED | OUTPATIENT
Start: 2022-12-03 | End: 2023-02-01

## 2022-12-03 RX ORDER — FUROSEMIDE 10 MG/ML
60 INJECTION INTRAMUSCULAR; INTRAVENOUS ONCE
Status: COMPLETED | OUTPATIENT
Start: 2022-12-03 | End: 2022-12-03

## 2022-12-03 RX ADMIN — FLUCONAZOLE 100 MG: 100 TABLET ORAL at 08:19

## 2022-12-03 RX ADMIN — OXYCODONE 5 MG: 5 TABLET ORAL at 01:36

## 2022-12-03 RX ADMIN — SUCRALFATE 1 G: 1 TABLET ORAL at 17:38

## 2022-12-03 RX ADMIN — HYDRALAZINE HYDROCHLORIDE 100 MG: 50 TABLET, FILM COATED ORAL at 21:14

## 2022-12-03 RX ADMIN — OXYCODONE 5 MG: 5 TABLET ORAL at 19:56

## 2022-12-03 RX ADMIN — LOSARTAN POTASSIUM 50 MG: 25 TABLET, FILM COATED ORAL at 10:41

## 2022-12-03 RX ADMIN — OXYCODONE 5 MG: 5 TABLET ORAL at 13:52

## 2022-12-03 RX ADMIN — CARVEDILOL 12.5 MG: 12.5 TABLET, FILM COATED ORAL at 08:21

## 2022-12-03 RX ADMIN — HYDRALAZINE HYDROCHLORIDE 100 MG: 50 TABLET, FILM COATED ORAL at 17:38

## 2022-12-03 RX ADMIN — PREGABALIN 75 MG: 25 CAPSULE ORAL at 21:14

## 2022-12-03 RX ADMIN — SUCRALFATE 1 G: 1 TABLET ORAL at 21:14

## 2022-12-03 RX ADMIN — SALINE NASAL SPRAY 2 SPRAY: 1.5 SOLUTION NASAL at 20:13

## 2022-12-03 RX ADMIN — Medication 3 UNITS: at 12:15

## 2022-12-03 RX ADMIN — TAMSULOSIN HYDROCHLORIDE 0.4 MG: 0.4 CAPSULE ORAL at 08:20

## 2022-12-03 RX ADMIN — SODIUM CHLORIDE 40 MG: 9 INJECTION, SOLUTION INTRAMUSCULAR; INTRAVENOUS; SUBCUTANEOUS at 21:14

## 2022-12-03 RX ADMIN — Medication 3 UNITS: at 08:22

## 2022-12-03 RX ADMIN — AMLODIPINE BESYLATE 10 MG: 5 TABLET ORAL at 08:20

## 2022-12-03 RX ADMIN — FUROSEMIDE 60 MG: 10 INJECTION, SOLUTION INTRAMUSCULAR; INTRAVENOUS at 17:38

## 2022-12-03 RX ADMIN — SODIUM CHLORIDE 40 MG: 9 INJECTION, SOLUTION INTRAMUSCULAR; INTRAVENOUS; SUBCUTANEOUS at 08:21

## 2022-12-03 RX ADMIN — Medication 8 UNITS: at 09:00

## 2022-12-03 RX ADMIN — HYDRALAZINE HYDROCHLORIDE 100 MG: 50 TABLET, FILM COATED ORAL at 08:20

## 2022-12-03 RX ADMIN — DULOXETINE HYDROCHLORIDE 60 MG: 30 CAPSULE, DELAYED RELEASE ORAL at 08:21

## 2022-12-03 RX ADMIN — FUROSEMIDE 80 MG: 40 TABLET ORAL at 08:21

## 2022-12-03 RX ADMIN — OXYCODONE 5 MG: 5 TABLET ORAL at 08:19

## 2022-12-03 RX ADMIN — SUCRALFATE 1 G: 1 TABLET ORAL at 06:35

## 2022-12-03 RX ADMIN — CARVEDILOL 12.5 MG: 12.5 TABLET, FILM COATED ORAL at 17:38

## 2022-12-03 RX ADMIN — FINASTERIDE 5 MG: 5 TABLET, FILM COATED ORAL at 08:21

## 2022-12-03 RX ADMIN — SUCRALFATE 1 G: 1 TABLET ORAL at 12:18

## 2022-12-03 NOTE — PROGRESS NOTES
Nephrology Progress Note  [facility] / [address] [facility] [place], [address]  Phone - [phone]  Fax - [phone]                 Patient: Allison Diaz                   YOB: 1980        Date- 12/3/2022                      Admit Date: 11/29/2022  CC: Follow up for esrd      IMPRESSION & PLAN:   Esrd-- hd mwf at [facility]  Hyponatremia  na 115 on 11-29-22 with glucose 1181. Hyperkalemia  hypertension  DKA  H/o Urinary retention requiring hansen cath  Type 1 diabetes  Anemia      PLAN-  No dialysis today  Continue norvasc, hydralazine, coreg  Start losartan  Continue hd mwf  Epogen for anemia    Okay to d/c renal stand point   Subjective: Interval History:   12-3-22- s/p hd yesterday-- bp high  12-1-22---- s/p hd yesterday--bp high-- blood sugar improved to 127---na 134- improved,   11/30/22-- he has h/o esrd- he is on hd at [facility]. He has been to [facility] last week. He was discharged from [facility] recently. Objective:   Vitals:    12/02/22 1900 12/02/22 2250 12/03/22 0338 12/03/22 0740   BP: 138/80 (!) 148/81 (!) 160/87 (!) 182/87   Pulse: 85 85 88 88   Resp:  18 18 19   Temp:  98.3 °F (36.8 °C) 97.7 °F (36.5 °C) 98.2 °F (36.8 °C)   TempSrc:       SpO2:  91% 92% 93%   Weight:       Height:          12/02 0701 - 12/03 0700  In: 250 [P.O.:150; I.V.:100]  Out: 2250 [Urine:250]  Last 3 Recorded Weights in this Encounter    11/29/22 1845 11/30/22 1826 12/01/22 2041   Weight: 77.1 kg (169 lb 15.6 oz) 75.8 kg (167 lb) 83.9 kg (185 lb)        Physical exam:    GEN:  nad  NECK:  Supple, no thyromegaly  RESP: Clear  b/l, no  wheezing,   CVS: RRR,S1,S2   NEURO: non focal, normal speech  EXT: Edema +nt     Permacath +    Chart reviewed. Pertinent Notes reviewed.      Data Review :  Recent Labs     12/03/22  0326 12/02/22  0416 12/01/22  0219   * 137 134*   K 3.8 3.6 3.8   CL 97 105 100 CO2 31 24 29   BUN 17 29* 27*   CREA 3.01* 3.45* 3.12*   * 136* 137*   CA 7.5* 6.3* 7.4*   MG 1.8 1.6 1.8       Recent Labs     12/03/22  0326 12/02/22  0416 12/01/22  1143 12/01/22  0219   WBC 8.9 7.8  --  14.6*   HGB 7.9* 8.2* 8.9* 6.7*   HCT 24.4* 24.7* 26.6* 19.7*    303  --  320       Recent Labs     12/01/22  0219   TIBC 140*   PSAT 31          Lab Results   Component Value Date/Time    Hemoglobin A1c 10.1 (H) 11/29/2022 08:48 PM    Hemoglobin A1c 7.2 (H) 09/26/2022 09:34 AM    Hemoglobin A1c 8.7 (H) 08/12/2022 12:41 AM        Lab Results   Component Value Date/Time    Microalbumin/Creat ratio (mg/g creat) 11,439 (H) 04/01/2021 10:00 AM    Microalbumin,urine random 151.00 04/01/2021 10:00 AM     Lab Results   Component Value Date/Time    NT pro-BNP 32,663 (H) 11/29/2022 06:55 PM    NT pro-BNP 3,665 (H) 10/22/2022 02:12 AM    NT pro-BNP 5,688 (H) 09/06/2022 09:16 AM    NT pro-BNP 11,477 (H) 08/16/2022 12:50 AM    NT pro-BNP 4,600 (H) 08/09/2022 02:13 PM     US Results (most recent):  Medication list  reviewed  Current Facility-Administered Medications   Medication Dose Route Frequency    0.9% sodium chloride infusion  10 mL/hr IntraVENous CONTINUOUS    heparin (porcine) 1,000 unit/mL injection 1,800 Units  1,800 Units Hemodialysis DIALYSIS PRN    heparin (porcine) 1,000 unit/mL injection 1,800 Units  1,800 Units Hemodialysis DIALYSIS PRN    oxyCODONE IR (ROXICODONE) tablet 5 mg  5 mg Oral Q6H PRN    fluconazole (DIFLUCAN) tablet 100 mg  100 mg Oral DAILY    0.9% sodium chloride infusion 250 mL  250 mL IntraVENous PRN    sucralfate (CARAFATE) tablet 1 g  1 g Oral AC&HS    labetaloL (NORMODYNE;TRANDATE) injection 20 mg  20 mg IntraVENous Q4H PRN    furosemide (LASIX) tablet 80 mg  80 mg Oral DAILY    epoetin rell-epbx (RETACRIT) injection 20,000 Units  20,000 Units SubCUTAneous Q MON, WED & FRI    insulin glargine (LANTUS) injection 8 Units  8 Units SubCUTAneous DAILY    insulin lispro (HUMALOG) injection   SubCUTAneous AC&HS    glucose chewable tablet 16 g  4 Tablet Oral PRN    glucagon (GLUCAGEN) injection 1 mg  1 mg IntraMUSCular PRN    dextrose 10 % infusion 0-250 mL  0-250 mL IntraVENous PRN    pantoprazole (PROTONIX) 40 mg in 0.9% sodium chloride 10 mL injection  40 mg IntraVENous Q12H    tamsulosin (FLOMAX) capsule 0.4 mg  0.4 mg Oral DAILY    carvediloL (COREG) tablet 12.5 mg  12.5 mg Oral BID WITH MEALS    amLODIPine (NORVASC) tablet 10 mg  10 mg Oral DAILY    finasteride (PROSCAR) tablet 5 mg  5 mg Oral DAILY    DULoxetine (CYMBALTA) capsule 60 mg  60 mg Oral DAILY    [Held by provider] aspirin chewable tablet 81 mg  81 mg Oral DAILY    hydrALAZINE (APRESOLINE) tablet 100 mg  100 mg Oral TID    pregabalin (LYRICA) capsule 75 mg  75 mg Oral QHS    ondansetron (ZOFRAN) injection 4 mg  4 mg IntraVENous Q4H PRN        Selina Blake MD  12/3/2022

## 2022-12-03 NOTE — PROGRESS NOTES
Transition of Care Plan:     RUR: 37% \"high risk\"  Disposition: Home with [facility] PT/OT/SN  Follow up appointments: PCP, Specialists   *Resume OP HD MWF @ [address] [place] [address]  DME needed: None  Transportation at Discharge: Family to provide transport at d/c  [place] or means to access home: Pt has access to home    IM Medicare Letter: N/A  Is patient a  and connected with the [place]? N/A  Caregiver Contact: Pt's mother Karey Huitron [phone]  Discharge Caregiver contacted prior to discharge? To be contacted  Care Conference needed?: Not at this time    10:41AM UPDATE  Discharge orders placed. CM spoke with [facility], [phone]) who confirmed pt has been accepted for [facility] PT/OT/SN services. Pt to call and schedule PCP and specialty care follow-up appointments as offices are closed at this time. Pt to resume OP HD MWF schedule @ [address] [place] [address]. Updated clinicals sent to [address] [place] [address]. All information entered into pt AVS.     1:00PM UPDATE  CM spoke with MD, d/c orders cancelled as pt is hypoxia and ordering chest x-ray. CM notified [facility] of d/c delay update. Care Management Interventions  PCP Verified by CM: Yes  Palliative Care Criteria Met (RRAT>21 & CHF Dx)?: No  Mode of Transport at Discharge:  Other (see comment) (family)  Transition of Care Consult (CM Consult): [address]: Yes  Discharge Durable Medical Equipment: No  Health Maintenance Reviewed: Yes  Physical Therapy Consult: Yes  Occupational Therapy Consult: Yes  Speech Therapy Consult: No  Support Systems: Parent(s)  Confirm Follow Up Transport: Family  The Plan for Transition of Care is Related to the Following Treatment Goals : HH PT/OT/SN  The Patient and/or Patient Representative was Provided with a Choice of Provider and Agrees with the Discharge Plan?: Yes  Name of the Patient Representative Who was Provided with a Choice of Provider and Agrees with the Discharge Plan: Justin Suresh (pt)  Freedom of Choice List was Provided with Basic Dialogue that Supports the Patient's Individualized Plan of Care/Goals, Treatment Preferences and Shares the Quality Data Associated with the Providers?: Yes  Discharge Location  Patient Expects to be Discharged to[de-identified] Home with home health    Shelburne Deangelo Chong [facility] Manager  [phone]

## 2022-12-03 NOTE — DISCHARGE SUMMARY
Discharge Summary      Name: Jerrica Martínez  [id number]  YOB: 1980 (Age: 38 y.o.)   Date of Admission: 11/29/2022  Date of Discharge: 12/3/2022  Attending Physician: Sherman Kimbrough MD    Discharge Diagnosis:   Active Problems:    * No active hospital problems. *      Consultations:  IP CONSULT TO NEPHROLOGY  IP CONSULT TO GASTROENTEROLOGY    Procedures:     Brief Admission History/Reason for Admission Per Chas Weldon, DO:     [address] Problems:       Discharge Exam:  Patient seen and examined by me on discharge day. Pertinent Findings:  Visit Vitals  BP (!) 182/87 (BP 1 Location: Right upper arm, BP Patient Position: At rest)   Pulse 88   Temp 98.2 °F (36.8 °C)   Resp 19   Ht 5' 8\" (1.727 m)   Wt 83.9 kg (185 lb)   SpO2 93%   BMI 28.13 kg/m²     Gen:    Not in distress  Chest: Clear lungs  CVS:   Regular rhythm. No edema  Abd:  Soft, not distended, not tender    Discharge/Recent Laboratory Results:  Recent Labs     12/03/22  0326   *   K 3.8   CL 97   CO2 31   BUN 17   *   CA 7.5*   MG 1.8     Recent Labs     12/03/22  0326   HGB 7.9*   HCT 24.4*   WBC 8.9          Discharge Medications:  Current Discharge Medication List        START taking these medications    Details   fluconazole (DIFLUCAN) 100 mg tablet Take 1 Tablet by mouth daily for 12 doses. FDA advises cautious prescribing of oral fluconazole in pregnancy. Indications: a fungal infection in the esophagus due to Candida  Qty: 12 Tablet, Refills: 0  Start date: 12/4/2022, End date: 12/16/2022      furosemide (LASIX) 40 mg tablet Take 2 Tablets by mouth daily. Qty: 30 Tablet, Refills: 0  Start date: 12/3/2022      losartan (COZAAR) 50 mg tablet Take 1 Tablet by mouth daily. Qty: 30 Tablet, Refills: 0  Start date: 12/3/2022      sucralfate (CARAFATE) 1 gram tablet Take 1 Tablet by mouth Before breakfast, lunch, dinner and at bedtime for 62 days.   Qty: 120 Tablet, Refills: 0  Start date: 12/3/2022, End date: 2/3/2023           CONTINUE these medications which have CHANGED    Details   pantoprazole (PROTONIX) 40 mg tablet Take 1 Tablet by mouth two (2) times a day for 60 days. Qty: 120 Tablet, Refills: 0  Start date: 12/3/2022, End date: 2/1/2023      insulin glargine (LANTUS) 100 unit/mL injection Take 10 units once daily at night  Qty: 1 mL, Refills: 0  Start date: 12/3/2022, End date: 2/3/2023           CONTINUE these medications which have NOT CHANGED    Details   rosuvastatin (CRESTOR) 10 mg tablet Take 10 mg by mouth nightly. sevelamer (RENAGEL) 800 mg tablet Take  by mouth three (3) times daily (with meals). pregabalin (Lyrica) 75 mg capsule Take 1 Capsule by mouth nightly. Max Daily Amount: 75 mg. Qty: 30 Capsule, Refills: 2    Associated Diagnoses: Other diabetic neurological complication associated with type 2 diabetes mellitus ([facility] [address] Recurrent falls      insulin lispro (HUMALOG) 100 unit/mL injection INITIATE CORRECTIVE INSULIN PROTOCOL (FREDA): High Sensitivity (thin, ESRD): AC (before meals), Q6H CORRECTIONAL SCALE only For Blood Sugar (mg/dl) of :           140-199=0 units          200-249=2 units 250-299=3 units 300-349=4 units 350-400=6 units 401 or greater = Call MD Give in addition to basal medications. Do Not Hold for NPO BEDTIME CORRECTIONAL sliding scale when scheduled: 200-249=1 units 250-349=2 units 350-400=3 units 401 or greater = Call MD Give in addition to basal medications. Do Not Hold for NPO Fast Acting - Administer Immediately - or within 15 minutes of start of meal, if mealtime coverage.   Qty: 1 Each, Refills: 2      lancets misc ACHS  Qty: 1 Each, Refills: 11      Blood-Glucose Meter monitoring kit ACHS  Qty: 1 Kit, Refills: 0      glucose blood VI test strips (blood glucose test) strip ACHS  Qty: 200 Strip, Refills: 0      Insulin Syringe-Needle U-100 1 mL 28 x 5/16\" syrg 1 Syringe by SubCUTAneous route Before breakfast, lunch, dinner and at bedtime. Qty: 200 Each, Refills: 2      DULoxetine (CYMBALTA) 60 mg capsule Take 1 capsule by mouth once daily  Qty: 30 Capsule, Refills: 0    Associated Diagnoses: Other diabetic neurological complication associated with type 1 diabetes mellitus (HCC)      naloxone (Narcan) 4 mg/actuation nasal spray Use 1 spray intranasally, then discard. Repeat with new spray every 2 min as needed for opioid overdose symptoms, alternating nostrils. Qty: 1 Each, Refills: 0    Associated Diagnoses: Chronic midline low back pain without sciatica      Ketone Blood Test strp 50 Each by Does Not Apply route as needed for PRN Reason (Other) (as needed for suspected dka). Qty: 50 Strip, Refills: 3      finasteride (PROSCAR) 5 mg tablet Take 1 Tablet by mouth in the morning. Qty: 30 Tablet, Refills: 2      Walker (Ultra-Light Rollator) misc Use while ambulating  Qty: 1 Each, Refills: 0    Associated Diagnoses: DM type 2, goal HbA1c < 7% ([facility] [address] Recurrent falls      amLODIPine (NORVASC) 10 mg tablet Take 1 Tablet by mouth daily. Qty: 90 Tablet, Refills: 1    Associated Diagnoses: Primary hypertension      Dexcom G6  misc Use with the dexcom device to check blood sugars 4 times a day  Qty: 1 Each, Refills: 0    Comments: [phone] dx code E10.65      Dexcom G6 Sensor brandi Use as directed every 10 days  Qty: 10 Each, Refills: 11    Comments: [phone] dx code E10.65      Dexcom G6 Transmitter brandi Use as directed  Qty: 10 Each, Refills: 3    Comments: [phone] dx code E10.65      Insulin Needles, Disposable, 31 gauge x 5/16\" ndle Dx code E11.65, use 6x daily  Qty: 200 Each, Refills: 11      carvediloL (COREG) 12.5 mg tablet Take 1 Tablet by mouth two (2) times daily (with meals). Qty: 60 Tablet, Refills: 1      tamsulosin (FLOMAX) 0.4 mg capsule Take 0.4 mg by mouth daily.            STOP taking these medications       hydrALAZINE (APRESOLINE) 100 mg tablet Comments:   Reason for Stopping: aspirin 81 mg chewable tablet Comments:   Reason for Stopping:                   DISPOSITION:    Home with Family:    Home with HH/PT/OT/RN:    SNF/LTC:    CATHIE:    OTHER:          Follow up with:   PCP : James Robbins MD  Follow-up Information       Follow up With Specialties Details Why Vicki Arguello MD Internal Medicine Physician Follow up in 3 day(s)  [address]      James Robbins MD Internal Medicine Physician   [facility]  [address]      Carter Douglas MD Gastroenterology Follow up  [address]  [phone]                Total time in minutes spent coordinating this discharge (includes going over instructions, follow-up, prescriptions, and preparing report for sign off to her PCP) :  35 minutes

## 2022-12-03 NOTE — PROGRESS NOTES
Hospitalist Progress Note    NAME: Yesy Javed   :  1980   MRN:  [id number]       Assessment / Plan:  Acute hypoxic respiratory failure, 86% on RA, requiring 3LNC  B/l sided pleural effusion and atelectasis seen on admission CXR  Pt is on 3LNC, weaned to RA and pt dropped down to 86% per RN. Will obtain CXR. Check pro radha  He has no cough, no leukocytosis and is not febrile. Will hold abx  IS use, encourage out of bed  Wean O2 as tolerated  Addendum at 3:58PM, f/u on CXR showed pulm edema. PTA lasix 80mg held since admission. Will give IV lasix 60mgx1 and resume lasix 80mg PO in the AM  Wean O2. DKA  Patient recently admitted to ICU on  at [facility] with a DKA and respiratory failure requiring intubation and mechanical ventilation, CRRT and pressors  A1C 10.1, worst than 2 months ago at 7.2  Was initially on insulin drip. Anion gap closed bicarb normal  Increase lantus to home dose 10 units as pt is eating more now  Diabetic management consult diabetic diet appreciate recs  Continue SSI    Anemia multifactorial  Anemia chronic kidney disease with esophagitis  Hemoglobin t continue to 7.2>6.7>8.9>8. 2  Will change to pantoprazole 40 IV twice daily  Status post 1 unit PRBC on   (Per discharge summary hemoglobin was around 7.4-7.6 at [facility])  GI recs appreciated  EGD -Severe grade D erosive esophagitis with candida plaques 26cm to EGJ at 41cm.    -Biopsy pending  -Recommend empiric treatment of esophagus with fluconazole 200 mg daily then 100 mg from day to 2 total 14 days  cont' sucralfate    ESRD on hemodialysis  Nephrology consulted  Continue MWF schedule  On sevelamer at home      Chest pain  Less likely ACS  Troponin slightly trended up to 200   oxycodone as needed  Patient had recent LHC early November not showing any significant CAD  Dilaudid as needed      HTN   - Will c/w home Norvasc, Coreg hydralazine     CAD  - Will continue with home asa, coreg     DM Neuropathy  - C/w home Cymbalta     BPH  - C/w home proscar, tamsulosin     Hypocalcemia        25.0 - 29.9 Overweight / Body mass index is 28.13 kg/m². Estimated discharge date: December 3  Barriers:Gi clearance, hb stability    Code status: Full  Prophylaxis: scd  Recommended Disposition: Home w/Family     Subjective:     Chief Complaint / Reason for Physician Visit  NAD. No new complaint. On 3LNC. Discussed with RN events overnight. Review of Systems: All pertinent positive as above all other systems negative  Symptom Y/N Comments  Symptom Y/N Comments   Fever/Chills    Chest Pain     Poor Appetite    Edema     Cough    Abdominal Pain     Sputum    Joint Pain     SOB/JOHNSTON    Pruritis/Rash     Nausea/vomit    Tolerating PT/OT     Diarrhea    Tolerating Diet     Constipation    Other       Could NOT obtain due to:      Objective:     VITALS:   Last 24hrs VS reviewed since prior progress note.  Most recent are:  Patient Vitals for the past 24 hrs:   Temp Pulse Resp BP SpO2   12/03/22 1041 98.3 °F (36.8 °C) 87 19 (!) 185/104 91 %   12/03/22 0740 98.2 °F (36.8 °C) 88 19 (!) 182/87 93 %   12/03/22 0338 97.7 °F (36.5 °C) 88 18 (!) 160/87 92 %   12/02/22 2250 98.3 °F (36.8 °C) 85 18 (!) 148/81 91 %   12/02/22 1900 -- 85 -- 138/80 --   12/02/22 1800 -- 87 -- (!) 167/88 --   12/02/22 1747 97.8 °F (36.6 °C) 87 16 (!) 171/92 94 %   12/02/22 1725 97.6 °F (36.4 °C) 82 16 (!) 181/98 --   12/02/22 1715 -- 81 16 (!) 164/95 --   12/02/22 1700 -- 80 16 (!) 188/90 --   12/02/22 1645 -- 76 16 (!) 171/87 --   12/02/22 1630 -- 82 16 (!) 184/91 --   12/02/22 1615 -- 80 16 (!) 178/98 --   12/02/22 1600 -- 81 16 (!) 161/90 --   12/02/22 1545 -- 80 16 (!) 171/75 --   12/02/22 1530 -- 78 16 (!) 179/90 --   12/02/22 1515 -- 82 16 (!) 151/68 --   12/02/22 1500 -- 75 16 126/74 --   12/02/22 1445 -- 77 16 (!) 149/78 --   12/02/22 1430 -- 77 16 (!) 155/85 --   12/02/22 1415 -- 80 16 (!) 178/88 --         Intake/Output Summary (Last 24 hours) at 12/3/2022 [facility] filed at 12/3/2022 0748  Gross per 24 hour   Intake 150 ml   Output 2430 ml   Net -2280 ml          I had a face to face encounter and independently examined this patient on 12/3/2022, as outlined below:  PHYSICAL EXAM:  General: WD, WN. Alert, cooperative, no acute distress    EENT:  EOMI. Anicteric sclerae. MMM  Resp:  CTA bilaterally, no wheezing or rales. No accessory muscle use  CV:  Regular  rhythm,  No edema  GI:  Soft, Non distended, Non tender. +Bowel sounds  Neurologic:  Alert and oriented X 3, normal speech,   Psych:   Good insight. Not anxious nor agitated  Skin:  No rashes. No jaundice    Reviewed most current lab test results and cultures  YES  Reviewed most current radiology test results   YES  Review and summation of old records today    NO  Reviewed patient's current orders and MAR    YES  PMH/SH reviewed - no change compared to H&P  ________________________________________________________________________  Care Plan discussed with:    Comments   Patient x    Family      RN x    Care Manager     Consultant                        Multidiciplinary team rounds were held today with , nursing, pharmacist and clinical coordinator. Patient's plan of care was discussed; medications were reviewed and discharge planning was addressed. ________________________________________________________________________  Total NON critical care TIME:  37 Minutes    Total CRITICAL CARE TIME Spent:   Minutes non procedure based      Comments   >50% of visit spent in counseling and coordination of care x    ________________________________________________________________________  Arleth Stubbs MD     Procedures: see electronic medical records for all procedures/Xrays and details which were not copied into this note but were reviewed prior to creation of Plan. LABS:  I reviewed today's most current labs and imaging studies.   Pertinent labs include:  Recent Labs     12/03/22  0326 12/02/22  0416 12/01/22  1143 12/01/22  0219   WBC 8.9 7.8  --  14.6*   HGB 7.9* 8.2* 8.9* 6.7*   HCT 24.4* 24.7* 26.6* 19.7*    303  --  320       Recent Labs     12/03/22  0326 12/02/22  0416 12/01/22 0219   * 137 134*   K 3.8 3.6 3.8   CL 97 105 100   CO2 31 24 29   * 136* 137*   BUN 17 29* 27*   CREA 3.01* 3.45* 3.12*   CA 7.5* 6.3* 7.4*   MG 1.8 1.6 1.8   ALB  --  1.2*  --          Signed: Francia Amezcua MD

## 2022-12-03 NOTE — PROGRESS NOTES
The patient's oxygen levels intermittently drop below 90% on room air at rest. While ambulating the patient's oxygen levels fluctuated between 86% and 90% on room air. MD made aware.

## 2022-12-03 NOTE — PROGRESS NOTES
Gastroenterology Progress Note    12/3/2022    Admit Date: 11/29/2022    Subjective: Follow up for: Anemia, vomiting,esophagitis        Patient feels much better. In NAD. The patient has no new complaints today. EGD done 12.2.22 by Dr Mark Suarez reviewed w/ the pt.       Current Facility-Administered Medications   Medication Dose Route Frequency    losartan (COZAAR) tablet 50 mg  50 mg Oral DAILY    cloNIDine HCL (CATAPRES) tablet 0.2 mg  0.2 mg Oral QID PRN    heparin (porcine) 1,000 unit/mL injection 1,800 Units  1,800 Units Hemodialysis DIALYSIS PRN    heparin (porcine) 1,000 unit/mL injection 1,800 Units  1,800 Units Hemodialysis DIALYSIS PRN    oxyCODONE IR (ROXICODONE) tablet 5 mg  5 mg Oral Q6H PRN    fluconazole (DIFLUCAN) tablet 100 mg  100 mg Oral DAILY    0.9% sodium chloride infusion 250 mL  250 mL IntraVENous PRN    sucralfate (CARAFATE) tablet 1 g  1 g Oral AC&HS    labetaloL (NORMODYNE;TRANDATE) injection 20 mg  20 mg IntraVENous Q4H PRN    furosemide (LASIX) tablet 80 mg  80 mg Oral DAILY    epoetin rell-epbx (RETACRIT) injection 20,000 Units  20,000 Units SubCUTAneous Q MON, WED & FRI    insulin glargine (LANTUS) injection 8 Units  8 Units SubCUTAneous DAILY    insulin lispro (HUMALOG) injection   SubCUTAneous AC&HS    glucose chewable tablet 16 g  4 Tablet Oral PRN    glucagon (GLUCAGEN) injection 1 mg  1 mg IntraMUSCular PRN    dextrose 10 % infusion 0-250 mL  0-250 mL IntraVENous PRN    pantoprazole (PROTONIX) 40 mg in 0.9% sodium chloride 10 mL injection  40 mg IntraVENous Q12H    tamsulosin (FLOMAX) capsule 0.4 mg  0.4 mg Oral DAILY    carvediloL (COREG) tablet 12.5 mg  12.5 mg Oral BID WITH MEALS    amLODIPine (NORVASC) tablet 10 mg  10 mg Oral DAILY    finasteride (PROSCAR) tablet 5 mg  5 mg Oral DAILY    DULoxetine (CYMBALTA) capsule 60 mg  60 mg Oral DAILY    [Held by provider] aspirin chewable tablet 81 mg  81 mg Oral DAILY    hydrALAZINE (APRESOLINE) tablet 100 mg  100 mg Oral TID    pregabalin (LYRICA) capsule 75 mg  75 mg Oral QHS    ondansetron (ZOFRAN) injection 4 mg  4 mg IntraVENous Q4H PRN        Objective:     Blood pressure (!) 185/104, pulse 87, temperature 98.3 °F (36.8 °C), resp. rate 19, height 5' 8\" (1.727 m), weight 83.9 kg (185 lb), SpO2 91 %. 12/03 0701 - 12/03 1900  In: -   Out: 180 [Urine:180]    12/01 1901 - 12/03 0700  In: 340 [P.O.:240; I.V.:100]  Out: 2700 [Urine:700]        Physical Examination:       General:AAO x 3,     Data Review    Recent Results (from the past 24 hour(s))   GLUCOSE, POC    Collection Time: 12/02/22  6:07 PM   Result Value Ref Range    Glucose (POC) 99 65 - 117 mg/dL    Performed by Tashi Kat PCT    GLUCOSE, POC    Collection Time: 12/02/22  9:02 PM   Result Value Ref Range    Glucose (POC) 181 (H) 65 - 117 mg/dL    Performed by [facility]    METABOLIC PANEL, BASIC    Collection Time: 12/03/22  3:26 AM   Result Value Ref Range    Sodium 132 (L) 136 - 145 mmol/L    Potassium 3.8 3.5 - 5.1 mmol/L    Chloride 97 97 - 108 mmol/L    CO2 31 21 - 32 mmol/L    Anion gap 4 (L) 5 - 15 mmol/L    Glucose 220 (H) 65 - 100 mg/dL    BUN 17 6 - 20 MG/DL    Creatinine 3.01 (H) 0.70 - 1.30 MG/DL    BUN/Creatinine ratio 6 (L) 12 - 20      eGFR 26 (L) >60 ml/min/1.73m2    Calcium 7.5 (L) 8.5 - 10.1 MG/DL   CBC WITH AUTOMATED DIFF    Collection Time: 12/03/22  3:26 AM   Result Value Ref Range    WBC 8.9 4.1 - 11.1 K/uL    RBC 2.94 (L) 4.10 - 5.70 M/uL    HGB 7.9 (L) 12.1 - 17.0 g/dL    HCT 24.4 (L) 36.6 - 50.3 %    MCV 83.0 80.0 - 99.0 FL    MCH 26.9 26.0 - 34.0 PG    MCHC 32.4 30.0 - 36.5 g/dL    RDW 19.6 (H) 11.5 - 14.5 %    PLATELET 718 966 - 049 K/uL    MPV 9.6 8.9 - 12.9 FL    NRBC 0.2 (H) 0  WBC    ABSOLUTE NRBC 0.02 (H) 0.00 - 0.01 K/uL    NEUTROPHILS 80 (H) 32 - 75 %    LYMPHOCYTES 12 12 - 49 %    MONOCYTES 5 5 - 13 %    EOSINOPHILS 2 0 - 7 %    BASOPHILS 0 0 - 1 %    IMMATURE GRANULOCYTES 1 (H) 0.0 - 0.5 %    ABS.  NEUTROPHILS 7.1 1.8 - 8.0 K/UL    ABS. LYMPHOCYTES 1.0 0.8 - 3.5 K/UL    ABS. MONOCYTES 0.5 0.0 - 1.0 K/UL    ABS. EOSINOPHILS 0.2 0.0 - 0.4 K/UL    ABS. BASOPHILS 0.0 0.0 - 0.1 K/UL    ABS. IMM. GRANS. 0.1 (H) 0.00 - 0.04 K/UL    DF AUTOMATED     MAGNESIUM    Collection Time: 12/03/22  3:26 AM   Result Value Ref Range    Magnesium 1.8 1.6 - 2.4 mg/dL   SAMPLES BEING HELD    Collection Time: 12/03/22  3:26 AM   Result Value Ref Range    SAMPLES BEING HELD 2PST     COMMENT        Add-on orders for these samples will be processed based on acceptable specimen integrity and analyte stability, which may vary by analyte. GLUCOSE, POC    Collection Time: 12/03/22  6:36 AM   Result Value Ref Range    Glucose (POC) 275 (H) 65 - 117 mg/dL    Performed by Andressa Last    GLUCOSE, POC    Collection Time: 12/03/22 10:42 AM   Result Value Ref Range    Glucose (POC) 283 (H) 65 - 117 mg/dL    Performed by [email or web address] PCT      Recent Labs     12/03/22  0326 12/02/22 0416   WBC 8.9 7.8   HGB 7.9* 8.2*   HCT 24.4* 24.7*    303     Recent Labs     12/03/22  0326 12/02/22  0416 12/01/22 0219   * 137 134*   K 3.8 3.6 3.8   CL 97 105 100   CO2 31 24 29   BUN 17 29* 27*   CREA 3.01* 3.45* 3.12*   * 136* 137*   CA 7.5* 6.3* 7.4*   MG 1.8 1.6 1.8     Recent Labs     12/02/22 0416   ALB 1.2*     No results for input(s): INR, PTP, APTT, INREXT in the last 72 hours. Recent Labs     12/01/22 0219   TIBC 140*   PSAT 31      No results found for: FOL, RBCF   No results for input(s): PH, PCO2, PO2 in the last 72 hours. No results for input(s): CPK, CKNDX, TROIQ in the last 72 hours.     No lab exists for component: CPKMB  Lab Results   Component Value Date/Time    Cholesterol, total 192 11/15/2021 12:01 PM    HDL Cholesterol 61 11/15/2021 12:01 PM    LDL, calculated 89.4 11/15/2021 12:01 PM    Triglyceride 208 (H) 11/15/2021 12:01 PM    CHOL/HDL Ratio 3.1 11/15/2021 12:01 PM     No components found for: [place]  Lab Results Component Value Date/Time    Color YELLOW/STRAW 11/29/2022 07:56 PM    Appearance CLEAR 11/29/2022 07:56 PM    Specific gravity 1.022 11/29/2022 07:56 PM    Specific gravity 1.020 05/08/2022 09:06 PM    pH (UA) 6.5 11/29/2022 07:56 PM    Protein 300 (A) 11/29/2022 07:56 PM    Glucose >1,000 (A) 11/29/2022 07:56 PM    Ketone 15 (A) 11/29/2022 07:56 PM    Bilirubin Negative 11/29/2022 07:56 PM    Urobilinogen 0.2 11/29/2022 07:56 PM    Nitrites Negative 11/29/2022 07:56 PM    Leukocyte Esterase Negative 11/29/2022 07:56 PM    Epithelial cells FEW 11/29/2022 07:56 PM    Bacteria Negative 11/29/2022 07:56 PM    WBC 0-4 11/29/2022 07:56 PM    RBC 0-5 11/29/2022 07:56 PM        ROS: -CP, SOB, Dysuria, palpitations, cough. Assessment:  Esophagitis  Anemia  DM  Vomiting     Plan/Discussion:     His hemoglobin is better. Started on  pantoprazole 40mg twice daily  EGD 12/2/22/[facility]  with severe LA grade D erosive esophagitis with candida plaques from  26cm to EGJ seen  at 41cm. We await bx. He is on fluconazole for total 14 days and PO sucralfate was added. Would repeat EGD in 8 weeks as OP. We will arrange it. Make sure DM is well controlled. We will sign off the case. Signed By: Angela Casanova.  Robyn Beckford MD    12/3/2022  1:54 PM

## 2022-12-04 LAB
ANION GAP SERPL CALC-SCNC: 7 MMOL/L (ref 5–15)
BASOPHILS # BLD: 0 K/UL (ref 0–0.1)
BASOPHILS NFR BLD: 0 % (ref 0–1)
BUN SERPL-MCNC: 24 MG/DL (ref 6–20)
BUN/CREAT SERPL: 6 (ref 12–20)
CALCIUM SERPL-MCNC: 7.9 MG/DL (ref 8.5–10.1)
CHLORIDE SERPL-SCNC: 96 MMOL/L (ref 97–108)
CO2 SERPL-SCNC: 27 MMOL/L (ref 21–32)
COMMENT, HOLDF: NORMAL
CREAT SERPL-MCNC: 3.73 MG/DL (ref 0.7–1.3)
DIFFERENTIAL METHOD BLD: ABNORMAL
EOSINOPHIL # BLD: 0.3 K/UL (ref 0–0.4)
EOSINOPHIL NFR BLD: 3 % (ref 0–7)
ERYTHROCYTE [DISTWIDTH] IN BLOOD BY AUTOMATED COUNT: 19.1 % (ref 11.5–14.5)
GLUCOSE BLD STRIP.AUTO-MCNC: 120 MG/DL (ref 65–117)
GLUCOSE BLD STRIP.AUTO-MCNC: 163 MG/DL (ref 65–117)
GLUCOSE BLD STRIP.AUTO-MCNC: 198 MG/DL (ref 65–117)
GLUCOSE BLD STRIP.AUTO-MCNC: 242 MG/DL (ref 65–117)
GLUCOSE SERPL-MCNC: 174 MG/DL (ref 65–100)
HCT VFR BLD AUTO: 24.5 % (ref 36.6–50.3)
HGB BLD-MCNC: 7.8 G/DL (ref 12.1–17)
IMM GRANULOCYTES # BLD AUTO: 0 K/UL (ref 0–0.04)
IMM GRANULOCYTES NFR BLD AUTO: 0 % (ref 0–0.5)
LYMPHOCYTES # BLD: 1 K/UL (ref 0.8–3.5)
LYMPHOCYTES NFR BLD: 10 % (ref 12–49)
MAGNESIUM SERPL-MCNC: 1.8 MG/DL (ref 1.6–2.4)
MCH RBC QN AUTO: 26.4 PG (ref 26–34)
MCHC RBC AUTO-ENTMCNC: 31.8 G/DL (ref 30–36.5)
MCV RBC AUTO: 82.8 FL (ref 80–99)
MONOCYTES # BLD: 0.5 K/UL (ref 0–1)
MONOCYTES NFR BLD: 5 % (ref 5–13)
NEUTS SEG # BLD: 7.7 K/UL (ref 1.8–8)
NEUTS SEG NFR BLD: 82 % (ref 32–75)
NRBC # BLD: 0.02 K/UL (ref 0–0.01)
NRBC BLD-RTO: 0.2 PER 100 WBC
PLATELET # BLD AUTO: 321 K/UL (ref 150–400)
PMV BLD AUTO: 9.8 FL (ref 8.9–12.9)
POTASSIUM SERPL-SCNC: 4.1 MMOL/L (ref 3.5–5.1)
RBC # BLD AUTO: 2.96 M/UL (ref 4.1–5.7)
RBC MORPH BLD: ABNORMAL
RBC MORPH BLD: ABNORMAL
SAMPLES BEING HELD,HOLD: NORMAL
SERVICE CMNT-IMP: ABNORMAL
SODIUM SERPL-SCNC: 130 MMOL/L (ref 136–145)
WBC # BLD AUTO: 9.5 K/UL (ref 4.1–11.1)

## 2022-12-04 PROCEDURE — 74011250636 HC RX REV CODE- 250/636: Performed by: INTERNAL MEDICINE

## 2022-12-04 PROCEDURE — 36415 COLL VENOUS BLD VENIPUNCTURE: CPT

## 2022-12-04 PROCEDURE — 74011000250 HC RX REV CODE- 250: Performed by: INTERNAL MEDICINE

## 2022-12-04 PROCEDURE — 74011636637 HC RX REV CODE- 636/637: Performed by: INTERNAL MEDICINE

## 2022-12-04 PROCEDURE — 83735 ASSAY OF MAGNESIUM: CPT

## 2022-12-04 PROCEDURE — 94760 N-INVAS EAR/PLS OXIMETRY 1: CPT

## 2022-12-04 PROCEDURE — 77010033678 HC OXYGEN DAILY

## 2022-12-04 PROCEDURE — 74011250637 HC RX REV CODE- 250/637: Performed by: INTERNAL MEDICINE

## 2022-12-04 PROCEDURE — 80048 BASIC METABOLIC PNL TOTAL CA: CPT

## 2022-12-04 PROCEDURE — 74011250637 HC RX REV CODE- 250/637: Performed by: STUDENT IN AN ORGANIZED HEALTH CARE EDUCATION/TRAINING PROGRAM

## 2022-12-04 PROCEDURE — 82962 GLUCOSE BLOOD TEST: CPT

## 2022-12-04 PROCEDURE — 65270000046 HC RM TELEMETRY

## 2022-12-04 PROCEDURE — 74011250637 HC RX REV CODE- 250/637: Performed by: PHYSICIAN ASSISTANT

## 2022-12-04 PROCEDURE — 85025 COMPLETE CBC W/AUTO DIFF WBC: CPT

## 2022-12-04 PROCEDURE — C9113 INJ PANTOPRAZOLE SODIUM, VIA: HCPCS | Performed by: INTERNAL MEDICINE

## 2022-12-04 RX ORDER — INSULIN GLARGINE 100 [IU]/ML
15 INJECTION, SOLUTION SUBCUTANEOUS DAILY
Status: DISCONTINUED | OUTPATIENT
Start: 2022-12-05 | End: 2022-12-06 | Stop reason: HOSPADM

## 2022-12-04 RX ADMIN — CARVEDILOL 12.5 MG: 12.5 TABLET, FILM COATED ORAL at 17:36

## 2022-12-04 RX ADMIN — FUROSEMIDE 80 MG: 40 TABLET ORAL at 08:27

## 2022-12-04 RX ADMIN — FLUCONAZOLE 100 MG: 100 TABLET ORAL at 08:27

## 2022-12-04 RX ADMIN — OXYCODONE 5 MG: 5 TABLET ORAL at 08:26

## 2022-12-04 RX ADMIN — SUCRALFATE 1 G: 1 TABLET ORAL at 11:26

## 2022-12-04 RX ADMIN — SUCRALFATE 1 G: 1 TABLET ORAL at 21:52

## 2022-12-04 RX ADMIN — LOSARTAN POTASSIUM 50 MG: 25 TABLET, FILM COATED ORAL at 10:31

## 2022-12-04 RX ADMIN — SUCRALFATE 1 G: 1 TABLET ORAL at 17:36

## 2022-12-04 RX ADMIN — OXYCODONE 5 MG: 5 TABLET ORAL at 17:42

## 2022-12-04 RX ADMIN — FINASTERIDE 5 MG: 5 TABLET, FILM COATED ORAL at 08:25

## 2022-12-04 RX ADMIN — HYDRALAZINE HYDROCHLORIDE 100 MG: 50 TABLET, FILM COATED ORAL at 21:52

## 2022-12-04 RX ADMIN — AMLODIPINE BESYLATE 10 MG: 5 TABLET ORAL at 08:25

## 2022-12-04 RX ADMIN — SODIUM CHLORIDE 40 MG: 9 INJECTION, SOLUTION INTRAMUSCULAR; INTRAVENOUS; SUBCUTANEOUS at 08:27

## 2022-12-04 RX ADMIN — Medication 2 UNITS: at 11:25

## 2022-12-04 RX ADMIN — CARVEDILOL 12.5 MG: 12.5 TABLET, FILM COATED ORAL at 08:26

## 2022-12-04 RX ADMIN — Medication 8 UNITS: at 08:24

## 2022-12-04 RX ADMIN — SUCRALFATE 1 G: 1 TABLET ORAL at 06:56

## 2022-12-04 RX ADMIN — HYDRALAZINE HYDROCHLORIDE 100 MG: 50 TABLET, FILM COATED ORAL at 10:30

## 2022-12-04 RX ADMIN — SODIUM CHLORIDE 40 MG: 9 INJECTION, SOLUTION INTRAMUSCULAR; INTRAVENOUS; SUBCUTANEOUS at 21:52

## 2022-12-04 RX ADMIN — PREGABALIN 75 MG: 25 CAPSULE ORAL at 21:52

## 2022-12-04 RX ADMIN — TAMSULOSIN HYDROCHLORIDE 0.4 MG: 0.4 CAPSULE ORAL at 08:25

## 2022-12-04 RX ADMIN — DULOXETINE HYDROCHLORIDE 60 MG: 30 CAPSULE, DELAYED RELEASE ORAL at 08:26

## 2022-12-04 RX ADMIN — HYDRALAZINE HYDROCHLORIDE 100 MG: 50 TABLET, FILM COATED ORAL at 17:36

## 2022-12-04 NOTE — PROGRESS NOTES
Bedside shift change report given to Tawana Garvey (oncoming nurse) by Evin Echeverria (offgoing nurse). Report included the following information SBAR.

## 2022-12-04 NOTE — PROGRESS NOTES
Problem: Falls - Risk of  Goal: *Absence of Falls  Description: Document Vaughn Boozer Fall Risk and appropriate interventions in the flowsheet. Outcome: Progressing Towards Goal  Note: Fall Risk Interventions:  Mobility Interventions: Patient to call before getting OOB         Medication Interventions: Patient to call before getting OOB    Elimination Interventions: Call light in reach    History of Falls Interventions: Bed/chair exit alarm         Problem: Falls - Risk of  Goal: *Absence of Falls  Description: Document Vaughn Boozer Fall Risk and appropriate interventions in the flowsheet.   Outcome: Progressing Towards Goal  Note: Fall Risk Interventions:  Mobility Interventions: Patient to call before getting OOB         Medication Interventions: Patient to call before getting OOB    Elimination Interventions: Call light in reach    History of Falls Interventions: Bed/chair exit alarm

## 2022-12-04 NOTE — PROGRESS NOTES
Hospitalist Progress Note    NAME: Swapna Dominguez   :  1980   MRN:  [id number]       Assessment / Plan:  Acute hypoxic respiratory failure, 86% on RA, requiring 3LNC  B/l sided pleural effusion and atelectasis seen on admission CXR    Still needing 2-3 liters oxygen  Repeat CXR: mild to moderated pulmonary edema silghtly worsened  Procal elevated to 3.4, however no wbc/ fever, hold off on abx  IS use, encourage out of bed  Wean O2 as tolerated  Continue with lasix  Hopefully wont need oxygen supplementation after HD     DKA  Patient recently admitted to ICU on  at [facility] with a DKA and respiratory failure requiring intubation and mechanical ventilation, CRRT and pressors  A1C 10.1, worst than 2 months ago at 7.2  Was initially on insulin drip. Anion gap closed bicarb normal  Increased lantus to 15 units  Diabetic management consult diabetic diet appreciate recs  Continue SSI     Anemia multifactorial  Anemia chronic kidney disease with esophagitis  Hemoglobin t continue to 7.2>6.7>8.9>8. 2  Will change to pantoprazole 40 IV twice daily  Status post 1 unit PRBC on   (Per discharge summary hemoglobin was around 7.4-7.6 at [facility])  GI recs appreciated  EGD -Severe grade D erosive esophagitis with candida plaques 26cm to EGJ at 41cm.    -Biopsy pending  -Recommend empiric treatment of esophagus with fluconazole 200 mg daily then 100 mg from day to 2 total 14 days  cont' sucralfate     ESRD on hemodialysis  Nephrology consulted  Continue MWF schedule  On sevelamer at home     Chest pain  Less likely ACS  Troponin slightly trended up to 200   oxycodone as needed  Patient had recent LHC early November not showing any significant CAD  Dilaudid as needed     HTN   - Will c/w home Norvasc, Coreg hydralazine     CAD  - Will continue with home asa, coreg     DM Neuropathy  - C/w home Cymbalta     BPH  - C/w home proscar, tamsulosin     Hypocalcemia     25.0 - 29.9 Overweight / Body mass index is 28.13 kg/m². Estimated discharge date: December 4  Barriers: Weaning oxygen     Code status: Full  Prophylaxis: scd  Recommended Disposition: Home w/Family       Subjective:     Chief Complaint / Reason for Physician Visit  Follow up for ESRD on HD  He denies any shortness of breath, cough    Review of Systems:  Symptom Y/N Comments  Symptom Y/N Comments   Fever/Chills n   Chest Pain n    Poor Appetite n   Edema n    Cough n   Abdominal Pain n    Sputum    Joint Pain     SOB/JOHNSTON    Pruritis/Rash     Nausea/vomit    Tolerating PT/OT     Diarrhea    Tolerating Diet     Constipation    Other       Could NOT obtain due to:      Objective:     VITALS:   Last 24hrs VS reviewed since prior progress note. Most recent are:  Patient Vitals for the past 24 hrs:   Temp Pulse Resp BP SpO2   12/04/22 1524 97.9 °F (36.6 °C) 78 20 (!) 170/76 96 %   12/04/22 1129 97.6 °F (36.4 °C) 81 20 (!) 160/80 99 %   12/04/22 0757 97.9 °F (36.6 °C) 81 18 (!) 173/86 100 %   12/04/22 0333 97.7 °F (36.5 °C) 79 18 (!) 159/77 96 %   12/03/22 2249 97.7 °F (36.5 °C) 77 18 (!) 168/92 97 %   12/03/22 1944 98.2 °F (36.8 °C) 81 16 (!) 185/96 95 %       Intake/Output Summary (Last 24 hours) at 12/4/2022 1531  Last data filed at 12/3/2022 2119  Gross per 24 hour   Intake --   Output 575 ml   Net -575 ml        I had a face to face encounter and independently examined this patient on 12/4/2022, as outlined below:  PHYSICAL EXAM:  General: Awake, No acute distress  EENT:  EOMI. Anicteric sclerae. MMM  Resp:  CTA bilaterally, no wheezing or rales. No accessory muscle use  CV:  Regular  rhythm,  No edema  GI:  Soft, Non distended, Non tender. +Bowel sounds  Neurologic:  Alert and oriented X 3, normal speech,   Psych:   Not anxious nor agitated  Skin:  No rashes.   No jaundice    Reviewed most current lab test results and cultures  YES  Reviewed most current radiology test results   YES  Review and summation of old records today    NO  Reviewed patient's current orders and MAR    YES  PMH/SH reviewed - no change compared to H&P  ________________________________________________________________________  Care Plan discussed with:    Comments   Patient y    Family      RN y    Care Manager     Consultant                        Multidiciplinary team rounds were held today with , nursing, pharmacist and clinical coordinator. Patient's plan of care was discussed; medications were reviewed and discharge planning was addressed. ________________________________________________________________________  Total NON critical care TIME:  36   Minutes    Total CRITICAL CARE TIME Spent:   Minutes non procedure based      Comments   >50% of visit spent in counseling and coordination of care     ________________________________________________________________________  Sonya Solano MD     Procedures: see electronic medical records for all procedures/Xrays and details which were not copied into this note but were reviewed prior to creation of Plan. LABS:  I reviewed today's most current labs and imaging studies.   Pertinent labs include:  Recent Labs     12/04/22 0341 12/03/22  0326 12/02/22  0416   WBC 9.5 8.9 7.8   HGB 7.8* 7.9* 8.2*   HCT 24.5* 24.4* 24.7*    329 303     Recent Labs     12/04/22  0341 12/03/22  1604 12/03/22  0326 12/02/22  0416   *  --  132* 137   K 4.1  --  3.8 3.6   CL 96*  --  97 105   CO2 27  --  31 24   *  --  220* 136*   BUN 24*  --  17 29*   CREA 3.73*  --  3.01* 3.45*   CA 7.9*  --  7.5* 6.3*   MG 1.8 1.7 1.8 1.6   ALB  --   --   --  1.2*       Signed: Sonya Solano MD

## 2022-12-05 LAB
ANION GAP SERPL CALC-SCNC: 5 MMOL/L (ref 5–15)
BASOPHILS # BLD: 0.1 K/UL (ref 0–0.1)
BASOPHILS NFR BLD: 1 % (ref 0–1)
BUN SERPL-MCNC: 33 MG/DL (ref 6–20)
BUN/CREAT SERPL: 7 (ref 12–20)
CALCIUM SERPL-MCNC: 8.1 MG/DL (ref 8.5–10.1)
CHLORIDE SERPL-SCNC: 96 MMOL/L (ref 97–108)
CO2 BLD-SCNC: 17.7 MMOL/L (ref 21–32)
CO2 SERPL-SCNC: 29 MMOL/L (ref 21–32)
CREAT SERPL-MCNC: 4.5 MG/DL (ref 0.7–1.3)
DIFFERENTIAL METHOD BLD: ABNORMAL
EOSINOPHIL # BLD: 0.3 K/UL (ref 0–0.4)
EOSINOPHIL NFR BLD: 3 % (ref 0–7)
ERYTHROCYTE [DISTWIDTH] IN BLOOD BY AUTOMATED COUNT: 18.9 % (ref 11.5–14.5)
GLUCOSE BLD STRIP.AUTO-MCNC: 107 MG/DL (ref 65–117)
GLUCOSE BLD STRIP.AUTO-MCNC: 130 MG/DL (ref 65–117)
GLUCOSE BLD STRIP.AUTO-MCNC: 257 MG/DL (ref 65–117)
GLUCOSE BLD STRIP.AUTO-MCNC: 292 MG/DL (ref 65–117)
GLUCOSE SERPL-MCNC: 144 MG/DL (ref 65–100)
HCT VFR BLD AUTO: 25.2 % (ref 36.6–50.3)
HGB BLD-MCNC: 8.1 G/DL (ref 12.1–17)
IMM GRANULOCYTES # BLD AUTO: 0 K/UL (ref 0–0.04)
IMM GRANULOCYTES NFR BLD AUTO: 0 % (ref 0–0.5)
LYMPHOCYTES # BLD: 0.9 K/UL (ref 0.8–3.5)
LYMPHOCYTES NFR BLD: 10 % (ref 12–49)
MAGNESIUM SERPL-MCNC: 1.8 MG/DL (ref 1.6–2.4)
MCH RBC QN AUTO: 26.8 PG (ref 26–34)
MCHC RBC AUTO-ENTMCNC: 32.1 G/DL (ref 30–36.5)
MCV RBC AUTO: 83.4 FL (ref 80–99)
MONOCYTES # BLD: 0.8 K/UL (ref 0–1)
MONOCYTES NFR BLD: 9 % (ref 5–13)
MYELOCYTES NFR BLD MANUAL: 1 %
NEUTS SEG # BLD: 6.8 K/UL (ref 1.8–8)
NEUTS SEG NFR BLD: 76 % (ref 32–75)
NRBC # BLD: 0.02 K/UL (ref 0–0.01)
NRBC BLD-RTO: 0.2 PER 100 WBC
PLATELET # BLD AUTO: 379 K/UL (ref 150–400)
PMV BLD AUTO: 10.1 FL (ref 8.9–12.9)
POTASSIUM SERPL-SCNC: 4 MMOL/L (ref 3.5–5.1)
RBC # BLD AUTO: 3.02 M/UL (ref 4.1–5.7)
RBC MORPH BLD: ABNORMAL
SERVICE CMNT-IMP: ABNORMAL
SERVICE CMNT-IMP: NORMAL
SODIUM SERPL-SCNC: 130 MMOL/L (ref 136–145)
WBC # BLD AUTO: 8.9 K/UL (ref 4.1–11.1)

## 2022-12-05 PROCEDURE — 74011000250 HC RX REV CODE- 250: Performed by: INTERNAL MEDICINE

## 2022-12-05 PROCEDURE — 90935 HEMODIALYSIS ONE EVALUATION: CPT

## 2022-12-05 PROCEDURE — 74011250636 HC RX REV CODE- 250/636: Performed by: INTERNAL MEDICINE

## 2022-12-05 PROCEDURE — 82962 GLUCOSE BLOOD TEST: CPT

## 2022-12-05 PROCEDURE — 74011636637 HC RX REV CODE- 636/637: Performed by: INTERNAL MEDICINE

## 2022-12-05 PROCEDURE — 94760 N-INVAS EAR/PLS OXIMETRY 1: CPT

## 2022-12-05 PROCEDURE — C9113 INJ PANTOPRAZOLE SODIUM, VIA: HCPCS | Performed by: INTERNAL MEDICINE

## 2022-12-05 PROCEDURE — 83735 ASSAY OF MAGNESIUM: CPT

## 2022-12-05 PROCEDURE — 74011250637 HC RX REV CODE- 250/637: Performed by: PHYSICIAN ASSISTANT

## 2022-12-05 PROCEDURE — 74011250637 HC RX REV CODE- 250/637: Performed by: INTERNAL MEDICINE

## 2022-12-05 PROCEDURE — 65270000046 HC RM TELEMETRY

## 2022-12-05 PROCEDURE — 74011636637 HC RX REV CODE- 636/637: Performed by: STUDENT IN AN ORGANIZED HEALTH CARE EDUCATION/TRAINING PROGRAM

## 2022-12-05 PROCEDURE — 80048 BASIC METABOLIC PNL TOTAL CA: CPT

## 2022-12-05 PROCEDURE — 85025 COMPLETE CBC W/AUTO DIFF WBC: CPT

## 2022-12-05 PROCEDURE — 74011250637 HC RX REV CODE- 250/637: Performed by: STUDENT IN AN ORGANIZED HEALTH CARE EDUCATION/TRAINING PROGRAM

## 2022-12-05 PROCEDURE — 36415 COLL VENOUS BLD VENIPUNCTURE: CPT

## 2022-12-05 PROCEDURE — 77010033678 HC OXYGEN DAILY

## 2022-12-05 RX ADMIN — HYDRALAZINE HYDROCHLORIDE 100 MG: 50 TABLET, FILM COATED ORAL at 20:49

## 2022-12-05 RX ADMIN — HYDRALAZINE HYDROCHLORIDE 100 MG: 50 TABLET, FILM COATED ORAL at 17:22

## 2022-12-05 RX ADMIN — HYDRALAZINE HYDROCHLORIDE 100 MG: 50 TABLET, FILM COATED ORAL at 12:10

## 2022-12-05 RX ADMIN — PREGABALIN 75 MG: 25 CAPSULE ORAL at 20:50

## 2022-12-05 RX ADMIN — SUCRALFATE 1 G: 1 TABLET ORAL at 17:22

## 2022-12-05 RX ADMIN — OXYCODONE 5 MG: 5 TABLET ORAL at 18:16

## 2022-12-05 RX ADMIN — CARVEDILOL 12.5 MG: 12.5 TABLET, FILM COATED ORAL at 17:22

## 2022-12-05 RX ADMIN — FINASTERIDE 5 MG: 5 TABLET, FILM COATED ORAL at 12:09

## 2022-12-05 RX ADMIN — DULOXETINE HYDROCHLORIDE 60 MG: 30 CAPSULE, DELAYED RELEASE ORAL at 12:10

## 2022-12-05 RX ADMIN — CARVEDILOL 12.5 MG: 12.5 TABLET, FILM COATED ORAL at 12:10

## 2022-12-05 RX ADMIN — SODIUM CHLORIDE 40 MG: 9 INJECTION, SOLUTION INTRAMUSCULAR; INTRAVENOUS; SUBCUTANEOUS at 20:49

## 2022-12-05 RX ADMIN — LOSARTAN POTASSIUM 50 MG: 25 TABLET, FILM COATED ORAL at 12:10

## 2022-12-05 RX ADMIN — EPOETIN ALFA-EPBX 20000 UNITS: 10000 INJECTION, SOLUTION INTRAVENOUS; SUBCUTANEOUS at 20:50

## 2022-12-05 RX ADMIN — SUCRALFATE 1 G: 1 TABLET ORAL at 20:50

## 2022-12-05 RX ADMIN — Medication 3 UNITS: at 17:22

## 2022-12-05 RX ADMIN — HEPARIN SODIUM 1800 UNITS: 1000 INJECTION INTRAVENOUS; SUBCUTANEOUS at 11:28

## 2022-12-05 RX ADMIN — Medication 2 UNITS: at 21:32

## 2022-12-05 RX ADMIN — AMLODIPINE BESYLATE 10 MG: 5 TABLET ORAL at 12:10

## 2022-12-05 RX ADMIN — INSULIN GLARGINE 15 UNITS: 100 INJECTION, SOLUTION SUBCUTANEOUS at 12:12

## 2022-12-05 RX ADMIN — SUCRALFATE 1 G: 1 TABLET ORAL at 12:09

## 2022-12-05 RX ADMIN — SODIUM CHLORIDE 40 MG: 9 INJECTION, SOLUTION INTRAMUSCULAR; INTRAVENOUS; SUBCUTANEOUS at 12:10

## 2022-12-05 RX ADMIN — FUROSEMIDE 80 MG: 40 TABLET ORAL at 12:10

## 2022-12-05 RX ADMIN — OXYCODONE 5 MG: 5 TABLET ORAL at 12:10

## 2022-12-05 RX ADMIN — TAMSULOSIN HYDROCHLORIDE 0.4 MG: 0.4 CAPSULE ORAL at 12:10

## 2022-12-05 RX ADMIN — FLUCONAZOLE 100 MG: 100 TABLET ORAL at 12:10

## 2022-12-05 NOTE — PROGRESS NOTES
Physical Therapy    Chart reviewed up to date. Attempted to see pt this AM for PT session, pt currently KAJAL for HD. Will defer and continue to follow.     Sean Sharma PT, DPT, NCS

## 2022-12-05 NOTE — PROGRESS NOTES
Problem: Diabetes Self-Management  Goal: *Disease process and treatment process  Description: Define diabetes and identify own type of diabetes; list 3 options for treating diabetes. Outcome: Progressing Towards Goal  Goal: *Incorporating nutritional management into lifestyle  Description: Describe effect of type, amount and timing of food on blood glucose; list 3 methods for planning meals. Outcome: Progressing Towards Goal  Goal: *Incorporating physical activity into lifestyle  Description: State effect of exercise on blood glucose levels. Outcome: Progressing Towards Goal  Goal: *Developing strategies to promote health/change behavior  Description: Define the ABC's of diabetes; identify appropriate screenings, schedule and personal plan for screenings. Outcome: Progressing Towards Goal  Goal: *Using medications safely  Description: State effect of diabetes medications on diabetes; name diabetes medication taking, action and side effects. Outcome: Progressing Towards Goal  Goal: *Monitoring blood glucose, interpreting and using results  Description: Identify recommended blood glucose targets  and personal targets. Outcome: Progressing Towards Goal  Goal: *Prevention, detection, treatment of acute complications  Description: List symptoms of hyper- and hypoglycemia; describe how to treat low blood sugar and actions for lowering  high blood glucose level. Outcome: Progressing Towards Goal  Goal: *Prevention, detection and treatment of chronic complications  Description: Define the natural course of diabetes and describe the relationship of blood glucose levels to long term complications of diabetes.   Outcome: Progressing Towards Goal  Goal: *Developing strategies to address psychosocial issues  Description: Describe feelings about living with diabetes; identify support needed and support network  Outcome: Progressing Towards Goal  Goal: *Insulin pump training  Outcome: Progressing Towards Goal  Goal: *Sick day guidelines  Outcome: Progressing Towards Goal     Problem: Pressure Injury - Risk of  Goal: *Prevention of pressure injury  Description: Document Corey Scale and appropriate interventions in the flowsheet. Outcome: Progressing Towards Goal  Note: Pressure Injury Interventions:  Sensory Interventions: Assess changes in LOC    Moisture Interventions: Absorbent underpads    Activity Interventions: Increase time out of bed    Mobility Interventions: HOB 30 degrees or less    Nutrition Interventions: Document food/fluid/supplement intake    Friction and Shear Interventions: HOB 30 degrees or less                Problem: Falls - Risk of  Goal: *Absence of Falls  Description: Document Shannon Fall Risk and appropriate interventions in the flowsheet.   Outcome: Progressing Towards Goal  Note: Fall Risk Interventions:  Mobility Interventions: Patient to call before getting OOB         Medication Interventions: Patient to call before getting OOB, Teach patient to arise slowly    Elimination Interventions: Call light in reach    History of Falls Interventions: Room close to nurse's station

## 2022-12-05 NOTE — PROGRESS NOTES
Bedside shift change report given to Chapincito Jackson (oncoming nurse) by Reji Goodwin (offgoing nurse). Report included the following information SBAR.

## 2022-12-05 NOTE — PROCEDURES
Hemodialysis / [phone]    Vitals Pre Post Assessment Pre Post   BP BP: (!) 164/90 (12/05/22 0806)   130/84 LOC See flow sheet See flow sheet   HR Pulse (Heart Rate): 81 (12/05/22 0806) 83 Lungs See flow sheet See flow sheet   Resp Resp Rate: 16 (12/05/22 0806) 16 Cardiac See flow sheet See flow sheet   Temp Temp: 98.1 °F (36.7 °C) (12/05/22 0806) 98.2 Skin See flow sheet See flow sheet   Weight    Edema See flow sheet See flow sheet   Tele status Remote tele Remote tele Pain Pain Intensity 1: 0 (12/05/22 0717)      Orders   Duration: Start: 0582 End: 1734 Total: 3.25hrs   Dialyzer: Dialyzer/Set Up Inspection: Abelino Lees (12/05/22 0806)   Jono Barnacle Bath: Dialysate K (mEq/L): 3 (12/05/22 0806)   Ca Bath: Dialysate CA (mEq/L): 2.5 (12/05/22 0806)   Na: Dialysate NA (mEq/L): 140 (12/05/22 0806)   Bicarb: Dialysate HCO3 (mEq/L): 40 (12/05/22 0806)   Target Fluid Removal: Goal/Amount of Fluid to Remove (mL): 2000 mL (12/05/22 0806)     Access   Type & Location: R PC : Pre- Assessment: Dressing dated 12/2/22 CDI. No s/s of infection. Both lumens aspirate & flush well. Running well at . SBAR received from Primary RN. Pt arrived to HD suite A&Ox4. Consent signed & on file OR Chronic consent applies. Each catheter limb disinfected per p&p, caps removed, hubs disinfected per p&p. Each lumen aspirated for blood return and flushed with Normal Saline per policy. VSS. Dialysis Tx initiated. Comments:   Dressing dated 12/2/22 CDI. No s/s of infection noted.                                      Labs   HBsAg (Antigen) / date: Neg 11/16/22                                              HBsAb (Antibody) / date: unk   Source: [facility]   Obtained/Reviewed  Critical Results Called HGB   Date Value Ref Range Status   12/05/2022 8.1 (L) 12.1 - 17.0 g/dL Final     Potassium   Date Value Ref Range Status   12/05/2022 4.0 3.5 - 5.1 mmol/L Final     Calcium   Date Value Ref Range Status   12/05/2022 8.1 (L) 8.5 - 10.1 MG/DL Final     BUN   Date Value Ref Range Status   12/05/2022 33 (H) 6 - 20 MG/DL Final     Creatinine   Date Value Ref Range Status   12/05/2022 4.50 (H) 0.70 - 1.30 MG/DL Final        Meds Given   Name Dose Route   Heparin 1:1000 1.8 [email or web address]   Heparin 1:1000 1.8 [email or web address]          Adequacy / Fluid    Total Liters Process: 73.0   Net Fluid Removed: 2000mL      Comments   Time Out Done:   (Time) 0801   Admitting Diagnosis: DKA   Consent obtained/signed: Informed Consent Verified: Yes (12/05/22 0806)   Machine / RO # Machine Number: [id number] (12/05/22 4485)   Primary Nurse Rpt Pre: Phill Turcios RN   Primary Nurse Rpt Post: Sandra Mei RN   Pt Education: Procedural   Care Plan: On going   Pts outpatient [facility]: [facility]     Tx Summary  0806:  R PC : Pre- Assessment: Dressing dated 12/2/22 CDI. No s/s of infection. Both lumens aspirate & flush well. Running well at . SBAR received from Primary RN. Pt arrived to HD suite A&Ox4. Consent signed & on file OR Chronic consent applies. Each catheter limb disinfected per p&p, caps removed, hubs disinfected per p&p. Each lumen aspirated for blood return and flushed with Normal Saline per policy. VSS. Dialysis Tx initiated. **Pt tolerated tx well. No issues or complaints voiced. **    1123: Tx ended. VSS. Each dialysis catheter limb disinfected per p&p, all possible blood returned per p&p, and each dialysis hub disinfected per p&p. Each lumen flushed, post dialysis catheter Heparin dwell instilled per order, and caps applied. Bed locked and in the lowest position, call bell and belongings in reach. SBAR given to Primary, RN. Patient is stable at time of their/ my departure. Post assessment: Dressing CDI. No changes. All Dialysis related medications have been reviewed. Comments:   Assessment performed by RN. Procedure and documentation observed and reviewed by Nora Berry RN.

## 2022-12-05 NOTE — PROGRESS NOTES
Hospitalist Progress Note    NAME: Lesa Mean   :  1980   MRN:  [id number]       Assessment / Plan:  Acute hypoxic respiratory failure, 86% on RA, requiring 3LNC  B/l sided pleural effusion and atelectasis seen on admission CXR    Still needing 2-3 liters oxygen  Repeat CXR: mild to moderated pulmonary edema silghtly worsened  Repeat CXR today  Procal elevated to 3.4, however no wbc/ fever, hold off on abx  IS use, encourage out of bed  Wean O2 as tolerated  Continue with lasix  Oxygen challenge after HD today     DKA  Patient recently admitted to ICU on  at [facility] with a DKA and respiratory failure requiring intubation and mechanical ventilation, CRRT and pressors  A1C 10.1, worst than 2 months ago at 7.2  Was initially on insulin drip. Anion gap closed bicarb normal  Increased lantus to 15 units  Diabetic management consult diabetic diet appreciate recs  Continue SSI     Anemia multifactorial  Anemia chronic kidney disease with esophagitis  Hemoglobin t continue to 7.2>6.7>8.9>8. 2  Will change to pantoprazole 40 IV twice daily  Status post 1 unit PRBC on   (Per discharge summary hemoglobin was around 7.4-7.6 at [facility])  GI recs appreciated  EGD -Severe grade D erosive esophagitis with candida plaques 26cm to EGJ at 41cm. -Biopsy pending  -Recommend empiric treatment of esophagus with fluconazole 200 mg daily then 100 mg from day to 2 total 14 days  cont' sucralfate     ESRD on hemodialysis  Nephrology consulted  Continue MWF schedule  On sevelamer at home     Chest pain  Less likely ACS  Troponin slightly trended up to 200   oxycodone as needed  Patient had recent LHC early November not showing any significant CAD  Dilaudid as needed     HTN   - Will c/w home Norvasc, Coreg hydralazine     CAD  - Will continue with home asa, coreg     DM Neuropathy  - C/w home Cymbalta     BPH  - C/w home proscar, tamsulosin     Hypocalcemia     25.0 - 29.9 Overweight / Body mass index is 28.13 kg/m². Estimated discharge date: December 6  Barriers: Weaning oxygen     Code status: Full  Prophylaxis: scd  Recommended Disposition: Home w/Family       Subjective:     Chief Complaint / Reason for Physician Visit  Follow up for ESRD on HD  Has no active complain    Review of Systems:  Symptom Y/N Comments  Symptom Y/N Comments   Fever/Chills n   Chest Pain n    Poor Appetite n   Edema n    Cough n   Abdominal Pain n    Sputum    Joint Pain     SOB/JOHNSTON    Pruritis/Rash     Nausea/vomit    Tolerating PT/OT     Diarrhea    Tolerating Diet     Constipation    Other       Could NOT obtain due to:      Objective:     VITALS:   Last 24hrs VS reviewed since prior progress note.  Most recent are:  Patient Vitals for the past 24 hrs:   Temp Pulse Resp BP SpO2   12/05/22 1438 98 °F (36.7 °C) 90 16 126/78 92 %   12/05/22 1234 -- -- -- -- 93 %   12/05/22 1233 -- -- -- -- (!) 89 %   12/05/22 1232 -- -- -- -- (!) 89 %   12/05/22 1231 -- -- -- -- (!) 89 %   12/05/22 1210 -- -- -- -- 92 %   12/05/22 1209 -- -- -- -- 92 %   12/05/22 1208 -- -- -- -- (!) 88 %   12/05/22 1203 -- -- -- -- 93 %   12/05/22 1202 -- -- -- -- 94 %   12/05/22 1201 -- -- -- -- 92 %   12/05/22 1200 -- -- -- -- 92 %   12/05/22 1159 -- -- -- -- 94 %   12/05/22 1158 -- -- -- -- 96 %   12/05/22 1157 -- -- -- -- 97 %   12/05/22 1156 -- -- -- -- 96 %   12/05/22 1155 -- -- -- -- 96 %   12/05/22 1154 -- -- -- -- 97 %   12/05/22 1153 -- -- -- -- 93 %   12/05/22 1152 -- -- -- -- 94 %   12/05/22 1151 -- -- -- -- 93 %   12/05/22 1150 -- -- -- -- 93 %   12/05/22 1149 -- -- -- -- 93 %   12/05/22 1148 -- -- -- -- 95 %   12/05/22 1147 -- -- -- -- 95 %   12/05/22 1146 -- -- -- -- 97 %   12/05/22 1145 -- -- -- -- 97 %   12/05/22 1144 98.2 °F (36.8 °C) 88 16 (!) 162/89 97 %   12/05/22 1123 98.2 °F (36.8 °C) 83 16 130/84 --   12/05/22 1115 -- 83 16 130/77 --   12/05/22 1100 -- 82 16 139/74 --   12/05/22 1045 -- 83 16 (!) 143/81 --   12/05/22 1030 -- 81 16 130/75 --   12/05/22 1015 -- 81 16 133/70 --   12/05/22 1000 -- 78 16 133/64 --   12/05/22 0945 -- 79 16 (!) 142/91 --   12/05/22 0930 -- 78 16 132/77 --   12/05/22 0915 -- 79 16 (!) 142/73 --   12/05/22 0900 -- 79 16 130/74 --   12/05/22 0845 -- 78 16 125/75 --   12/05/22 0830 -- 78 16 135/82 --   12/05/22 0815 -- 78 16 (!) 156/89 --   12/05/22 0806 98.1 °F (36.7 °C) 81 16 (!) 164/90 --   12/05/22 0717 98.6 °F (37 °C) 78 11 (!) 147/80 96 %   12/05/22 0308 97.8 °F (36.6 °C) 77 -- (!) 150/92 95 %   12/04/22 2241 97.9 °F (36.6 °C) 86 20 137/79 94 %   12/04/22 2152 -- 81 -- (!) 168/76 --   12/04/22 1929 97.9 °F (36.6 °C) 79 18 (!) 152/87 93 %       Intake/Output Summary (Last 24 hours) at 12/5/2022 1533  Last data filed at 12/5/2022 1123  Gross per 24 hour   Intake --   Output 2400 ml   Net -2400 ml        I had a face to face encounter and independently examined this patient on 12/5/2022, as outlined below:  PHYSICAL EXAM:  General: Awake, No acute distress  EENT:  EOMI. Anicteric sclerae. MMM  Resp:  CTA bilaterally, no wheezing or rales. No accessory muscle use  CV:  Regular  rhythm,  No edema  GI:  Soft, Non distended, Non tender. +Bowel sounds  Neurologic:  Alert and oriented X 3, normal speech,   Psych:   Not anxious nor agitated  Skin:  No rashes. No jaundice    Reviewed most current lab test results and cultures  YES  Reviewed most current radiology test results   YES  Review and summation of old records today    NO  Reviewed patient's current orders and MAR    YES  PMH/SH reviewed - no change compared to H&P  ________________________________________________________________________  Care Plan discussed with:    Comments   Patient y    Family      RN y    Care Manager     Consultant                        Multidiciplinary team rounds were held today with , nursing, pharmacist and clinical coordinator. Patient's plan of care was discussed; medications were reviewed and discharge planning was addressed. ________________________________________________________________________  Total NON critical care TIME:  36   Minutes    Total CRITICAL CARE TIME Spent:   Minutes non procedure based      Comments   >50% of visit spent in counseling and coordination of care     ________________________________________________________________________  Viral Obrien MD     Procedures: see electronic medical records for all procedures/Xrays and details which were not copied into this note but were reviewed prior to creation of Plan. LABS:  I reviewed today's most current labs and imaging studies.   Pertinent labs include:  Recent Labs     12/05/22 0316 12/04/22  0341 12/03/22  0326   WBC 8.9 9.5 8.9   HGB 8.1* 7.8* 7.9*   HCT 25.2* 24.5* 24.4*    321 329     Recent Labs     12/05/22 0316 12/04/22  0341 12/03/22  1604 12/03/22  0326   * 130*  --  132*   K 4.0 4.1  --  3.8   CL 96* 96*  --  97   CO2 29 27  --  31   * 174*  --  220*   BUN 33* 24*  --  17   CREA 4.50* 3.73*  --  3.01*   CA 8.1* 7.9*  --  7.5*   MG 1.8 1.8 1.7 1.8       Signed: Viral Obrien MD

## 2022-12-05 NOTE — PROGRESS NOTES
Nephrology Progress Note  [facility] / [facility], [place], [address]  Phone - [phone]  Fax - [phone]                 Patient: Jaziel Roland                   YOB: 1980        Date- 12/5/2022                      Admit Date: 11/29/2022  CC: Follow up for esrd      IMPRESSION & PLAN:   ESRD, MWF, [facility], Dr. Oumou Angulo  Valeri esophagitis  Hyperkalemia  hypertension  DKA  H/o Urinary retention requiring hansen cath  Type 1 diabetes  Anemia      PLAN-  Plan hemodialysis today  Continue present blood pressure medications  Continue hd mwf  Epogen for anemia    Okay to d/c from renal stand point   Subjective: Interval History:   Seen and examined on dialysis  No further nausea vomiting  Tolerating regular diet      Objective:   Vitals:    12/05/22 1030 12/05/22 1045 12/05/22 1100 12/05/22 1115   BP: 130/75 (!) 143/81 139/74 130/77   Pulse: 81 83 82 83   Resp: 16 16 16 16   Temp:       TempSrc:       SpO2:       Weight:       Height:          12/04 0701 - 12/05 0700  In: -   Out: 400 [Urine:400]  Last 3 Recorded Weights in this Encounter    11/30/22 1826 12/01/22 2041 12/05/22 0508   Weight: 75.8 kg (167 lb) 83.9 kg (185 lb) 80.4 kg (177 lb 4 oz)        Physical exam:    GEN:  nad  NECK:  Supple, no thyromegaly  RESP: Clear  b/l, no  wheezing,   CVS: RRR,S1,S2   NEURO: non focal, normal speech  EXT: Edema +nt     Permacath +    Chart reviewed. Pertinent Notes reviewed.      Data Review :  Recent Labs     12/05/22  0316 12/04/22  0341 12/03/22  1604 12/03/22  0326   * 130*  --  132*   K 4.0 4.1  --  3.8   CL 96* 96*  --  97   CO2 29 27  --  31   BUN 33* 24*  --  17   CREA 4.50* 3.73*  --  3.01*   * 174*  --  220*   CA 8.1* 7.9*  --  7.5*   MG 1.8 1.8 1.7 1.8       Recent Labs     12/05/22  0316 12/04/22  0341 12/03/22  0326   WBC 8.9 9.5 8.9   HGB 8.1* 7.8* 7.9* HCT 25.2* 24.5* 24.4*    321 329       No results for input(s): FE, TIBC, PSAT, FERR in the last 72 hours.        Lab Results   Component Value Date/Time    Hemoglobin A1c 10.1 (H) 11/29/2022 08:48 PM    Hemoglobin A1c 7.2 (H) 09/26/2022 09:34 AM    Hemoglobin A1c 8.7 (H) 08/12/2022 12:41 AM        Lab Results   Component Value Date/Time    Microalbumin/Creat ratio (mg/g creat) 11,439 (H) 04/01/2021 10:00 AM    Microalbumin,urine random 151.00 04/01/2021 10:00 AM     Lab Results   Component Value Date/Time    NT pro-BNP 32,663 (H) 11/29/2022 06:55 PM    NT pro-BNP 3,665 (H) 10/22/2022 02:12 AM    NT pro-BNP 5,688 (H) 09/06/2022 09:16 AM    NT pro-BNP 11,477 (H) 08/16/2022 12:50 AM    NT pro-BNP 4,600 (H) 08/09/2022 02:13 PM     US Results (most recent):  Medication list  reviewed  Current Facility-Administered Medications   Medication Dose Route Frequency    insulin glargine (LANTUS) injection 15 Units  15 Units SubCUTAneous DAILY    losartan (COZAAR) tablet 50 mg  50 mg Oral DAILY    cloNIDine HCL (CATAPRES) tablet 0.2 mg  0.2 mg Oral QID PRN    sodium chloride (OCEAN) 0.65 % nasal squeeze bottle 2 Spray  2 Spray Both Nostrils Q2H PRN    heparin (porcine) 1,000 unit/mL injection 1,800 Units  1,800 Units Hemodialysis DIALYSIS PRN    heparin (porcine) 1,000 unit/mL injection 1,800 Units  1,800 Units Hemodialysis DIALYSIS PRN    oxyCODONE IR (ROXICODONE) tablet 5 mg  5 mg Oral Q6H PRN    fluconazole (DIFLUCAN) tablet 100 mg  100 mg Oral DAILY    0.9% sodium chloride infusion 250 mL  250 mL IntraVENous PRN    sucralfate (CARAFATE) tablet 1 g  1 g Oral AC&HS    labetaloL (NORMODYNE;TRANDATE) injection 20 mg  20 mg IntraVENous Q4H PRN    furosemide (LASIX) tablet 80 mg  80 mg Oral DAILY    epoetin rell-epbx (RETACRIT) injection 20,000 Units  20,000 Units SubCUTAneous Q MON, WED & FRI    insulin lispro (HUMALOG) injection   SubCUTAneous AC&HS    glucose chewable tablet 16 g  4 Tablet Oral PRN    glucagon (GLUCAGEN) injection 1 mg  1 mg IntraMUSCular PRN    dextrose 10 % infusion 0-250 mL  0-250 mL IntraVENous PRN    pantoprazole (PROTONIX) 40 mg in 0.9% sodium chloride 10 mL injection  40 mg IntraVENous Q12H    tamsulosin (FLOMAX) capsule 0.4 mg  0.4 mg Oral DAILY    carvediloL (COREG) tablet 12.5 mg  12.5 mg Oral BID WITH MEALS    amLODIPine (NORVASC) tablet 10 mg  10 mg Oral DAILY    finasteride (PROSCAR) tablet 5 mg  5 mg Oral DAILY    DULoxetine (CYMBALTA) capsule 60 mg  60 mg Oral DAILY    [Held by provider] aspirin chewable tablet 81 mg  81 mg Oral DAILY    hydrALAZINE (APRESOLINE) tablet 100 mg  100 mg Oral TID    pregabalin (LYRICA) capsule 75 mg  75 mg Oral QHS    ondansetron (ZOFRAN) injection 4 mg  4 mg IntraVENous Q4H PRN        Cora Baptiste MD  12/5/2022

## 2022-12-05 NOTE — PROGRESS NOTES
End of Shift Note    Bedside shift change report given to Edilson Rader RN (oncoming nurse) by Barney Garrido RN (offgoing nurse). Report included the following information SBAR, Kardex, ED Summary, Intake/Output, MAR, and Recent Results    Shift worked:  night     Shift summary and any significant changes:     Pt rested comfortably, report given for HD this AM     Room air trial after HD today for potential dc     Concerns for physician to address:  none     Zone phone for oncoming shift:   xxx       Activity:  Activity Level: Up with Assistance  Number times ambulated in hallways past shift: 0  Number of times OOB to chair past shift: 0    Cardiac:   Cardiac Monitoring: Yes      Cardiac Rhythm: Sinus Rhythm    Access:  Current line(s): PIV and HD access     Genitourinary:   Urinary status: voiding    Respiratory:   O2 Device: Nasal cannula  Chronic home O2 use?: NO  Incentive spirometer at bedside: NO       GI:  Last Bowel Movement Date: 11/30/22  Current diet:  ADULT DIET Regular; 4 carb choices (60 gm/meal); Low Potassium (Less than 3000 mg/day)  DIET ONE TIME MESSAGE  Passing flatus: YES  Tolerating current diet: YES       Pain Management:   Patient states pain is manageable on current regimen: YES    Skin:  Corey Score: 20  Interventions: increase time out of bed and nutritional support     Patient Safety:  Fall Score:  Total Score: 2  Interventions: bed/chair alarm, gripper socks, and pt to call before getting OOB  High Fall Risk: Yes    Length of Stay:  Expected LOS: 3d 19h  Actual LOS: 6      Barney Garrido RN

## 2022-12-05 NOTE — PROGRESS NOTES
Home Oxygen Test  Date of test: 12/5/2022  Time of test: 1415    Sa02 87 % on room air AT REST. Sa02 87 % on room air DURING AMBULATION. Sa02 86 % on 1 Liters DURING AMBULATION. Sa02 93 % on 2 Liters DURING AMBULATION. Sa02 93 % on 2 Liters AT REST/AFTER AMBULATION.

## 2022-12-05 NOTE — PROGRESS NOTES
Occupational Therapy    Chart reviewed in prep for skilled OT treatment; however, pt KAJAL for HD. Will defer and continue to follow.     Thank you,  Mikal Coello, OT

## 2022-12-05 NOTE — PROGRESS NOTES
Gastroenterology Progress Note  Bogdan Velazquez, [id number] for [address]. Bryant Thomas)    12/5/2022    Admit Date: 11/29/2022    Subjective: Follow up for: Anemia, vomiting, esophagitis        Seen while on dialysis. Tolerating a regular diabetic diet.   Denies dysphagia, odynophagia, N/V  EGD done 12.2.22 by Dr Mason Schultz: severe grade D esophagitis with candida plaques, no bleeding, multiple bx taken, normal stomach and duodenum    Lab Results   Component Value Date/Time    WBC 8.9 12/05/2022 03:16 AM    Hemoglobin (POC) 18.4 (H) 03/07/2017 09:00 AM    HGB 8.1 (L) 12/05/2022 03:16 AM    Hematocrit (POC) 41 10/22/2020 01:07 PM    HCT 25.2 (L) 12/05/2022 03:16 AM    PLATELET 466 92/18/9101 03:16 AM    MCV 83.4 12/05/2022 03:16 AM       Current Facility-Administered Medications   Medication Dose Route Frequency    insulin glargine (LANTUS) injection 15 Units  15 Units SubCUTAneous DAILY    losartan (COZAAR) tablet 50 mg  50 mg Oral DAILY    cloNIDine HCL (CATAPRES) tablet 0.2 mg  0.2 mg Oral QID PRN    sodium chloride (OCEAN) 0.65 % nasal squeeze bottle 2 Spray  2 Spray Both Nostrils Q2H PRN    heparin (porcine) 1,000 unit/mL injection 1,800 Units  1,800 Units Hemodialysis DIALYSIS PRN    heparin (porcine) 1,000 unit/mL injection 1,800 Units  1,800 Units Hemodialysis DIALYSIS PRN    oxyCODONE IR (ROXICODONE) tablet 5 mg  5 mg Oral Q6H PRN    fluconazole (DIFLUCAN) tablet 100 mg  100 mg Oral DAILY    0.9% sodium chloride infusion 250 mL  250 mL IntraVENous PRN    sucralfate (CARAFATE) tablet 1 g  1 g Oral AC&HS    labetaloL (NORMODYNE;TRANDATE) injection 20 mg  20 mg IntraVENous Q4H PRN    furosemide (LASIX) tablet 80 mg  80 mg Oral DAILY    epoetin rell-epbx (RETACRIT) injection 20,000 Units  20,000 Units SubCUTAneous Q MON, WED & FRI    insulin lispro (HUMALOG) injection   SubCUTAneous AC&HS    glucose chewable tablet 16 g  4 Tablet Oral PRN    glucagon (GLUCAGEN) injection 1 mg  1 mg IntraMUSCular PRN    dextrose 10 % infusion 0-250 mL  0-250 mL IntraVENous PRN    pantoprazole (PROTONIX) 40 mg in 0.9% sodium chloride 10 mL injection  40 mg IntraVENous Q12H    tamsulosin (FLOMAX) capsule 0.4 mg  0.4 mg Oral DAILY    carvediloL (COREG) tablet 12.5 mg  12.5 mg Oral BID WITH MEALS    amLODIPine (NORVASC) tablet 10 mg  10 mg Oral DAILY    finasteride (PROSCAR) tablet 5 mg  5 mg Oral DAILY    DULoxetine (CYMBALTA) capsule 60 mg  60 mg Oral DAILY    [Held by provider] aspirin chewable tablet 81 mg  81 mg Oral DAILY    hydrALAZINE (APRESOLINE) tablet 100 mg  100 mg Oral TID    pregabalin (LYRICA) capsule 75 mg  75 mg Oral QHS    ondansetron (ZOFRAN) injection 4 mg  4 mg IntraVENous Q4H PRN        Objective:     Blood pressure 139/74, pulse 82, temperature 98.1 °F (36.7 °C), temperature source Oral, resp. rate 16, height 5' 8\" (1.727 m), weight 80.4 kg (177 lb 4 oz), SpO2 96 %. No intake/output data recorded.     12/03 1901 - 12/05 0700  In: -   Out: 500 [Urine:500]        Physical Examination:       General: white male in nad, getting dialysis  HEENT: sclera anicteric  Heart: RRR  Lungs: CTA B  Abd: obese, ND, soft, NT  Ext: no edema  Neuro: A&O x 3    Data Review    Recent Results (from the past 24 hour(s))   GLUCOSE, POC    Collection Time: 12/04/22  4:26 PM   Result Value Ref Range    Glucose (POC) 120 (H) 65 - 117 mg/dL    Performed by Alfredo Coreria    GLUCOSE, POC    Collection Time: 12/04/22  8:42 PM   Result Value Ref Range    Glucose (POC) 163 (H) 65 - 117 mg/dL    Performed by Brittney TORIBIO    CBC WITH AUTOMATED DIFF    Collection Time: 12/05/22  3:16 AM   Result Value Ref Range    WBC 8.9 4.1 - 11.1 K/uL    RBC 3.02 (L) 4.10 - 5.70 M/uL    HGB 8.1 (L) 12.1 - 17.0 g/dL    HCT 25.2 (L) 36.6 - 50.3 %    MCV 83.4 80.0 - 99.0 FL    MCH 26.8 26.0 - 34.0 PG    MCHC 32.1 30.0 - 36.5 g/dL    RDW 18.9 (H) 11.5 - 14.5 %    PLATELET 070 967 - 202 K/uL    MPV 10.1 8.9 - 12.9 FL    NRBC 0.2 (H) 0  WBC    ABSOLUTE NRBC 0.02 (H) 0.00 - 0.01 K/uL    NEUTROPHILS 76 (H) 32 - 75 %    LYMPHOCYTES 10 (L) 12 - 49 %    MONOCYTES 9 5 - 13 %    EOSINOPHILS 3 0 - 7 %    BASOPHILS 1 0 - 1 %    MYELOCYTES 1 %    IMMATURE GRANULOCYTES 0 0.0 - 0.5 %    ABS. NEUTROPHILS 6.8 1.8 - 8.0 K/UL    ABS. LYMPHOCYTES 0.9 0.8 - 3.5 K/UL    ABS. MONOCYTES 0.8 0.0 - 1.0 K/UL    ABS. EOSINOPHILS 0.3 0.0 - 0.4 K/UL    ABS. BASOPHILS 0.1 0.0 - 0.1 K/UL    ABS. IMM. GRANS. 0.0 0.00 - 0.04 K/UL    DF MANUAL      RBC COMMENTS ANISOCYTOSIS  1+       METABOLIC PANEL, BASIC    Collection Time: 12/05/22  3:16 AM   Result Value Ref Range    Sodium 130 (L) 136 - 145 mmol/L    Potassium 4.0 3.5 - 5.1 mmol/L    Chloride 96 (L) 97 - 108 mmol/L    CO2 29 21 - 32 mmol/L    Anion gap 5 5 - 15 mmol/L    Glucose 144 (H) 65 - 100 mg/dL    BUN 33 (H) 6 - 20 MG/DL    Creatinine 4.50 (H) 0.70 - 1.30 MG/DL    BUN/Creatinine ratio 7 (L) 12 - 20      eGFR 16 (L) >60 ml/min/1.73m2    Calcium 8.1 (L) 8.5 - 10.1 MG/DL   MAGNESIUM    Collection Time: 12/05/22  3:16 AM   Result Value Ref Range    Magnesium 1.8 1.6 - 2.4 mg/dL   GLUCOSE, POC    Collection Time: 12/05/22  6:52 AM   Result Value Ref Range    Glucose (POC) 130 (H) 65 - 117 mg/dL    Performed by Cierra Crump PCT      Recent Labs     12/05/22 0316 12/04/22  0341   WBC 8.9 9.5   HGB 8.1* 7.8*   HCT 25.2* 24.5*    321     Recent Labs     12/05/22  0316 12/04/22  0341 12/03/22  1604 12/03/22  0326   * 130*  --  132*   K 4.0 4.1  --  3.8   CL 96* 96*  --  97   CO2 29 27  --  31   BUN 33* 24*  --  17   CREA 4.50* 3.73*  --  3.01*   * 174*  --  220*   CA 8.1* 7.9*  --  7.5*   MG 1.8 1.8 1.7 1.8     No results for input(s): AP, TBIL, TP, ALB, GLOB, GGT, AML, LPSE in the last 72 hours. No lab exists for component: SGOT, GPT, AMYP, HLPSE  No results for input(s): INR, PTP, APTT, INREXT, INREXT in the last 72 hours. No results for input(s): FE, TIBC, PSAT, FERR in the last 72 hours.      No results found for: FOL, RBCF No results for input(s): PH, PCO2, PO2 in the last 72 hours. No results for input(s): CPK, CKNDX, TROIQ in the last 72 hours. No lab exists for component: CPKMB  Lab Results   Component Value Date/Time    Cholesterol, total 192 11/15/2021 12:01 PM    HDL Cholesterol 61 11/15/2021 12:01 PM    LDL, calculated 89.4 11/15/2021 12:01 PM    Triglyceride 208 (H) 11/15/2021 12:01 PM    CHOL/HDL Ratio 3.1 11/15/2021 12:01 PM     No components found for: [place]  Lab Results   Component Value Date/Time    Color YELLOW/STRAW 11/29/2022 07:56 PM    Appearance CLEAR 11/29/2022 07:56 PM    Specific gravity 1.022 11/29/2022 07:56 PM    Specific gravity 1.020 05/08/2022 09:06 PM    pH (UA) 6.5 11/29/2022 07:56 PM    Protein 300 (A) 11/29/2022 07:56 PM    Glucose >1,000 (A) 11/29/2022 07:56 PM    Ketone 15 (A) 11/29/2022 07:56 PM    Bilirubin Negative 11/29/2022 07:56 PM    Urobilinogen 0.2 11/29/2022 07:56 PM    Nitrites Negative 11/29/2022 07:56 PM    Leukocyte Esterase Negative 11/29/2022 07:56 PM    Epithelial cells FEW 11/29/2022 07:56 PM    Bacteria Negative 11/29/2022 07:56 PM    WBC 0-4 11/29/2022 07:56 PM    RBC 0-5 11/29/2022 07:56 PM        ROS: -CP, SOB, Dysuria, palpitations, cough. Assessment:  Esophagitis, grade D  Anemia  DM  Vomiting     Plan/Discussion:     His hemoglobin is stable and he is on  pantoprazole 40mg twice daily plus sucralfate QID and Diflucan x 14 days total  EGD 12/2/22/[facility]  with severe LA grade D erosive esophagitis with candida plaques from  26 cm to EGJ seen  at 41cm. We await bx. He is on fluconazole for total of 14 days and on PO sucralfate. We would repeat his EGD in ~8 weeks as OP. I will arrange it. This was confirmed with the pt who agrees with plan. We will follow path remotely and sign off. Call with questions. MANUEL Sears    12/5/2022    11:16 AM   I have examined the patient.   I have reviewed the chart and agree with the documentation recorded by the RUFUS, including the assessment, treatment plan, and disposition. Patient seen and examined by me. I personally performed all components of the history, physical, and medical decision making and agree with the assessment and plan with minor modifications as noted. Exam:  Alert and awake  HEENT AT  GI: soft w/ normal bowel sounds    Plan:  EGD path results are not back as of now. He has no issues w/ a diabetic diet. He is on PPI BID,Carafate and Diflucan. We will sign off and arrange an OP EGD in 8 weeks or so as OP. Follow up on path: please and if there are any questions feel free to call us. Rahat Jones MD  [address] [facility])

## 2022-12-05 NOTE — PROGRESS NOTES
Transition of Care Plan:    RUR: 28%- [place]  Disposition: Home with [address] has accepted    Follow up appointments: PP, Specialist  DME needed: Home oxygen pending- [facility] [place]  Transportation at Discharge: Family to provide transportation  [address] or means to access home:        IM Medicare Letter:n/a-  [id number]  Complete Medicaid  Is patient a [place] and connected with the [place]? N/A            If yes, was [place] transfer form completed and [facility] notified? N/A  Caregiver Contact:  Discharge Caregiver contacted prior to discharge? Care Conference needed?:            Initial note [id number]    CM discussed d/c plan with pt at bedside. Pt requiring home oxygen- referral pending. CM sent referral to [address] via [employer] .      [address]  Phone: [phone]

## 2022-12-06 VITALS
SYSTOLIC BLOOD PRESSURE: 129 MMHG | BODY MASS INDEX: 26.93 KG/M2 | RESPIRATION RATE: 18 BRPM | WEIGHT: 177.69 LBS | HEART RATE: 78 BPM | HEIGHT: 68 IN | TEMPERATURE: 97.7 F | DIASTOLIC BLOOD PRESSURE: 84 MMHG | OXYGEN SATURATION: 95 %

## 2022-12-06 LAB
ANION GAP SERPL CALC-SCNC: 4 MMOL/L (ref 5–15)
BASOPHILS # BLD: 0.1 K/UL (ref 0–0.1)
BASOPHILS NFR BLD: 1 % (ref 0–1)
BUN SERPL-MCNC: 18 MG/DL (ref 6–20)
BUN/CREAT SERPL: 5 (ref 12–20)
CALCIUM SERPL-MCNC: 7.8 MG/DL (ref 8.5–10.1)
CHLORIDE SERPL-SCNC: 97 MMOL/L (ref 97–108)
CO2 SERPL-SCNC: 34 MMOL/L (ref 21–32)
CREAT SERPL-MCNC: 3.38 MG/DL (ref 0.7–1.3)
DIFFERENTIAL METHOD BLD: ABNORMAL
EOSINOPHIL # BLD: 0.2 K/UL (ref 0–0.4)
EOSINOPHIL NFR BLD: 3 % (ref 0–7)
ERYTHROCYTE [DISTWIDTH] IN BLOOD BY AUTOMATED COUNT: 19.5 % (ref 11.5–14.5)
GLUCOSE BLD STRIP.AUTO-MCNC: 72 MG/DL (ref 65–117)
GLUCOSE BLD STRIP.AUTO-MCNC: 91 MG/DL (ref 65–117)
GLUCOSE SERPL-MCNC: 102 MG/DL (ref 65–100)
HCT VFR BLD AUTO: 24.5 % (ref 36.6–50.3)
HGB BLD-MCNC: 7.8 G/DL (ref 12.1–17)
IMM GRANULOCYTES # BLD AUTO: 0.1 K/UL (ref 0–0.04)
IMM GRANULOCYTES NFR BLD AUTO: 2 % (ref 0–0.5)
LYMPHOCYTES # BLD: 1.5 K/UL (ref 0.8–3.5)
LYMPHOCYTES NFR BLD: 22 % (ref 12–49)
MAGNESIUM SERPL-MCNC: 1.8 MG/DL (ref 1.6–2.4)
MCH RBC QN AUTO: 26.9 PG (ref 26–34)
MCHC RBC AUTO-ENTMCNC: 31.8 G/DL (ref 30–36.5)
MCV RBC AUTO: 84.5 FL (ref 80–99)
MONOCYTES # BLD: 1 K/UL (ref 0–1)
MONOCYTES NFR BLD: 15 % (ref 5–13)
NEUTS SEG # BLD: 4.1 K/UL (ref 1.8–8)
NEUTS SEG NFR BLD: 57 % (ref 32–75)
NRBC # BLD: 0.03 K/UL (ref 0–0.01)
NRBC BLD-RTO: 0.4 PER 100 WBC
PLATELET # BLD AUTO: 401 K/UL (ref 150–400)
PMV BLD AUTO: 10 FL (ref 8.9–12.9)
POTASSIUM SERPL-SCNC: 3.6 MMOL/L (ref 3.5–5.1)
RBC # BLD AUTO: 2.9 M/UL (ref 4.1–5.7)
SERVICE CMNT-IMP: NORMAL
SERVICE CMNT-IMP: NORMAL
SODIUM SERPL-SCNC: 135 MMOL/L (ref 136–145)
WBC # BLD AUTO: 7 K/UL (ref 4.1–11.1)

## 2022-12-06 PROCEDURE — 74011250637 HC RX REV CODE- 250/637: Performed by: INTERNAL MEDICINE

## 2022-12-06 PROCEDURE — 82962 GLUCOSE BLOOD TEST: CPT

## 2022-12-06 PROCEDURE — 74011250637 HC RX REV CODE- 250/637: Performed by: PHYSICIAN ASSISTANT

## 2022-12-06 PROCEDURE — 74011000250 HC RX REV CODE- 250: Performed by: INTERNAL MEDICINE

## 2022-12-06 PROCEDURE — 80048 BASIC METABOLIC PNL TOTAL CA: CPT

## 2022-12-06 PROCEDURE — 94760 N-INVAS EAR/PLS OXIMETRY 1: CPT

## 2022-12-06 PROCEDURE — 85025 COMPLETE CBC W/AUTO DIFF WBC: CPT

## 2022-12-06 PROCEDURE — 74011250637 HC RX REV CODE- 250/637: Performed by: STUDENT IN AN ORGANIZED HEALTH CARE EDUCATION/TRAINING PROGRAM

## 2022-12-06 PROCEDURE — 99231 SBSQ HOSP IP/OBS SF/LOW 25: CPT

## 2022-12-06 PROCEDURE — 74011636637 HC RX REV CODE- 636/637: Performed by: STUDENT IN AN ORGANIZED HEALTH CARE EDUCATION/TRAINING PROGRAM

## 2022-12-06 PROCEDURE — 77010033678 HC OXYGEN DAILY

## 2022-12-06 PROCEDURE — 74011250636 HC RX REV CODE- 250/636: Performed by: INTERNAL MEDICINE

## 2022-12-06 PROCEDURE — 97535 SELF CARE MNGMENT TRAINING: CPT

## 2022-12-06 PROCEDURE — 36415 COLL VENOUS BLD VENIPUNCTURE: CPT

## 2022-12-06 PROCEDURE — C9113 INJ PANTOPRAZOLE SODIUM, VIA: HCPCS | Performed by: INTERNAL MEDICINE

## 2022-12-06 PROCEDURE — 83735 ASSAY OF MAGNESIUM: CPT

## 2022-12-06 RX ORDER — FLUCONAZOLE 100 MG/1
100 TABLET ORAL DAILY
Qty: 9 TABLET | Refills: 0 | Status: SHIPPED | OUTPATIENT
Start: 2022-12-06 | End: 2022-12-15

## 2022-12-06 RX ADMIN — HYDRALAZINE HYDROCHLORIDE 100 MG: 50 TABLET, FILM COATED ORAL at 08:19

## 2022-12-06 RX ADMIN — SUCRALFATE 1 G: 1 TABLET ORAL at 08:19

## 2022-12-06 RX ADMIN — TAMSULOSIN HYDROCHLORIDE 0.4 MG: 0.4 CAPSULE ORAL at 08:19

## 2022-12-06 RX ADMIN — DULOXETINE HYDROCHLORIDE 60 MG: 30 CAPSULE, DELAYED RELEASE ORAL at 08:19

## 2022-12-06 RX ADMIN — CARVEDILOL 12.5 MG: 12.5 TABLET, FILM COATED ORAL at 08:19

## 2022-12-06 RX ADMIN — INSULIN GLARGINE 15 UNITS: 100 INJECTION, SOLUTION SUBCUTANEOUS at 08:19

## 2022-12-06 RX ADMIN — LOSARTAN POTASSIUM 50 MG: 25 TABLET, FILM COATED ORAL at 08:19

## 2022-12-06 RX ADMIN — OXYCODONE 5 MG: 5 TABLET ORAL at 08:19

## 2022-12-06 RX ADMIN — FINASTERIDE 5 MG: 5 TABLET, FILM COATED ORAL at 08:19

## 2022-12-06 RX ADMIN — FUROSEMIDE 80 MG: 40 TABLET ORAL at 08:19

## 2022-12-06 RX ADMIN — SODIUM CHLORIDE 40 MG: 9 INJECTION, SOLUTION INTRAMUSCULAR; INTRAVENOUS; SUBCUTANEOUS at 08:20

## 2022-12-06 RX ADMIN — FLUCONAZOLE 100 MG: 100 TABLET ORAL at 08:19

## 2022-12-06 RX ADMIN — AMLODIPINE BESYLATE 10 MG: 5 TABLET ORAL at 08:19

## 2022-12-06 NOTE — PROGRESS NOTES
Problem: Diabetes Self-Management  Goal: *Disease process and treatment process  Description: Define diabetes and identify own type of diabetes; list 3 options for treating diabetes. Outcome: Resolved/Met  Goal: *Incorporating nutritional management into lifestyle  Description: Describe effect of type, amount and timing of food on blood glucose; list 3 methods for planning meals. Outcome: Resolved/Met  Goal: *Incorporating physical activity into lifestyle  Description: State effect of exercise on blood glucose levels. Outcome: Resolved/Met  Goal: *Developing strategies to promote health/change behavior  Description: Define the ABC's of diabetes; identify appropriate screenings, schedule and personal plan for screenings. Outcome: Resolved/Met  Goal: *Using medications safely  Description: State effect of diabetes medications on diabetes; name diabetes medication taking, action and side effects. Outcome: Resolved/Met  Goal: *Monitoring blood glucose, interpreting and using results  Description: Identify recommended blood glucose targets  and personal targets. Outcome: Resolved/Met  Goal: *Prevention, detection, treatment of acute complications  Description: List symptoms of hyper- and hypoglycemia; describe how to treat low blood sugar and actions for lowering  high blood glucose level. Outcome: Resolved/Met  Goal: *Prevention, detection and treatment of chronic complications  Description: Define the natural course of diabetes and describe the relationship of blood glucose levels to long term complications of diabetes.   Outcome: Resolved/Met  Goal: *Developing strategies to address psychosocial issues  Description: Describe feelings about living with diabetes; identify support needed and support network  Outcome: Resolved/Met  Goal: *Insulin pump training  Outcome: Resolved/Met  Goal: *Sick day guidelines  Outcome: Resolved/Met  Goal: *Patient Specific Goal (EDIT GOAL, INSERT TEXT)  Outcome: Resolved/Met Problem: Patient Education: Go to Patient Education Activity  Goal: Patient/Family Education  Outcome: Resolved/Met     Problem: Patient Education: Go to Patient Education Activity  Goal: Patient/Family Education  Outcome: Resolved/Met     Problem: Pressure Injury - Risk of  Goal: *Prevention of pressure injury  Description: Document Corey Scale and appropriate interventions in the flowsheet. Outcome: Resolved/Met     Problem: Patient Education: Go to Patient Education Activity  Goal: Patient/Family Education  Outcome: Resolved/Met     Problem: Falls - Risk of  Goal: *Absence of Falls  Description: Document Shannon Fall Risk and appropriate interventions in the flowsheet.   Outcome: Resolved/Met     Problem: Patient Education: Go to Patient Education Activity  Goal: Patient/Family Education  Outcome: Resolved/Met     Problem: Patient Education: Go to Patient Education Activity  Goal: Patient/Family Education  Outcome: Resolved/Met

## 2022-12-06 NOTE — PROGRESS NOTES
Problem: Diabetes Self-Management  Goal: *Disease process and treatment process  Description: Define diabetes and identify own type of diabetes; list 3 options for treating diabetes. Outcome: Progressing Towards Goal  Goal: *Incorporating nutritional management into lifestyle  Description: Describe effect of type, amount and timing of food on blood glucose; list 3 methods for planning meals. Outcome: Progressing Towards Goal  Goal: *Incorporating physical activity into lifestyle  Description: State effect of exercise on blood glucose levels. Outcome: Progressing Towards Goal  Goal: *Developing strategies to promote health/change behavior  Description: Define the ABC's of diabetes; identify appropriate screenings, schedule and personal plan for screenings. Outcome: Progressing Towards Goal  Goal: *Using medications safely  Description: State effect of diabetes medications on diabetes; name diabetes medication taking, action and side effects. Outcome: Progressing Towards Goal  Goal: *Monitoring blood glucose, interpreting and using results  Description: Identify recommended blood glucose targets  and personal targets. Outcome: Progressing Towards Goal  Goal: *Prevention, detection, treatment of acute complications  Description: List symptoms of hyper- and hypoglycemia; describe how to treat low blood sugar and actions for lowering  high blood glucose level. Outcome: Progressing Towards Goal  Goal: *Prevention, detection and treatment of chronic complications  Description: Define the natural course of diabetes and describe the relationship of blood glucose levels to long term complications of diabetes.   Outcome: Progressing Towards Goal  Goal: *Developing strategies to address psychosocial issues  Description: Describe feelings about living with diabetes; identify support needed and support network  Outcome: Progressing Towards Goal  Goal: *Insulin pump training  Outcome: Progressing Towards Goal  Goal: *Sick day guidelines  Outcome: Progressing Towards Goal     Problem: Pressure Injury - Risk of  Goal: *Prevention of pressure injury  Description: Document Corey Scale and appropriate interventions in the flowsheet. Outcome: Progressing Towards Goal  Note: Pressure Injury Interventions:  Sensory Interventions: Assess changes in LOC    Moisture Interventions: Absorbent underpads, Offer toileting Q_hr    Activity Interventions: Increase time out of bed    Mobility Interventions: Float heels, HOB 30 degrees or less    Nutrition Interventions: Document food/fluid/supplement intake    Friction and Shear Interventions: HOB 30 degrees or less                Problem: Falls - Risk of  Goal: *Absence of Falls  Description: Document Shannon Fall Risk and appropriate interventions in the flowsheet.   Outcome: Progressing Towards Goal  Note: Fall Risk Interventions:  Mobility Interventions: Patient to call before getting OOB         Medication Interventions: Patient to call before getting OOB, Teach patient to arise slowly    Elimination Interventions: Call light in reach, Patient to call for help with toileting needs, Urinal in reach    History of Falls Interventions: Door open when patient unattended, Room close to nurse's station

## 2022-12-06 NOTE — PROGRESS NOTES
End of Shift Note    Bedside shift change report given to [facility], RN (oncoming nurse) by tEhel Renteria (offgoing nurse). Report included the following information SBAR and Cardiac Rhythm NSR    Shift worked:  7a-7p     Shift summary and any significant changes:       Concerns for physician to address:    Zone phone for oncoming shift:       Activity:  Activity Level: Up with Assistance  Number times ambulated in hallways past shift: 1  Number of times OOB to chair past shift: 0    Cardiac:   Cardiac Monitoring: Yes      Cardiac Rhythm:  (NSR per report)    Access:  Current line(s): PIV     Genitourinary:   Urinary status: voiding    Respiratory:   O2 Device: Nasal cannula  Chronic home O2 use?: NO  Incentive spirometer at bedside: N/A       GI:  Last Bowel Movement Date: 11/30/22  Current diet:  ADULT DIET Regular; 4 carb choices (60 gm/meal); Low Potassium (Less than 3000 mg/day)  DIET ONE TIME MESSAGE  Passing flatus: YES  Tolerating current diet: YES       Pain Management:   Patient states pain is manageable on current regimen: YES    Skin:  Corey Score: 20  Interventions: increase time out of bed    Patient Safety:  Fall Score:  Total Score: 2  Interventions: gripper socks and pt to call before getting OOB  High Fall Risk: Yes    Length of Stay:  Expected LOS: 3d 19h  Actual LOS: 6      Ethel Renteria

## 2022-12-06 NOTE — PROGRESS NOTES
PCP hospital follow-up transitional care appointment has been scheduled with NP Parisa Lundberg on 12/8/22 at 1100. This is the first available appt as Dr. Clemencia Walter has limited availability. Pending patient discharge.  [place]susan Hoyos, Care Management Assistant

## 2022-12-06 NOTE — PROGRESS NOTES
Nephrology Progress Note  [facility] / [facility], Jesus Romanou, [address]  Phone - [phone]  Fax - [phone]                 Patient: Mykel Vogt                   YOB: 1980        Date- 12/6/2022                      Admit Date: 11/29/2022  CC: Follow up for esrd      IMPRESSION & PLAN:   ESRD, MWF, [facility]on[facility] Mcgee, Dr. Bravo Degroot  Valeri esophagitis  Hyperkalemia  hypertension  DKA  H/o Urinary retention requiring hansen cath  Type 1 diabetes  Anemia      PLAN-  Plan hemodialysis tomorrow if still inpatient  Continue present blood pressure medications  Continue hd mwf  Epogen for anemia    Okay to d/c from renal stand point   Subjective: Interval History:   Seen and examined   No further nausea vomiting  Tolerating regular diet      Objective:   Vitals:    12/06/22 0339 12/06/22 0348 12/06/22 0711 12/06/22 1118   BP:  128/73 (!) 142/79 129/84   Pulse:  83 86 84   Resp:  17 18 18   Temp:  97.7 °F (36.5 °C) 97.7 °F (36.5 °C) 97.7 °F (36.5 °C)   TempSrc:       SpO2:  92% 93% 93%   Weight: 80.6 kg (177 lb 11.1 oz)      Height:          12/05 0701 - 12/06 0700  In: -   Out: 2000   Last 3 Recorded Weights in this Encounter    12/01/22 2041 12/05/22 0508 12/06/22 0339   Weight: 83.9 kg (185 lb) 80.4 kg (177 lb 4 oz) 80.6 kg (177 lb 11.1 oz)        Physical exam:    GEN:  nad  NECK:  Supple, no thyromegaly  RESP: Clear  b/l, no  wheezing,   CVS: RRR,S1,S2   NEURO: non focal, normal speech  EXT: Edema +nt     Permacath +    Chart reviewed. Pertinent Notes reviewed.      Data Review :  Recent Labs     12/06/22  0336 12/05/22  0316 12/04/22  0341   * 130* 130*   K 3.6 4.0 4.1   CL 97 96* 96*   CO2 34* 29 27   BUN 18 33* 24*   CREA 3.38* 4.50* 3.73*   * 144* 174*   CA 7.8* 8.1* 7.9*   MG 1.8 1.8 1.8       Recent Labs     12/06/22  0336 12/05/22  0316 12/04/22  0341   WBC 7.0 8.9 9. 5   HGB 7.8* 8.1* 7.8*   HCT 24.5* 25.2* 24.5*   * 379 321       No results for input(s): FE, TIBC, PSAT, FERR in the last 72 hours.        Lab Results   Component Value Date/Time    Hemoglobin A1c 10.1 (H) 11/29/2022 08:48 PM    Hemoglobin A1c 7.2 (H) 09/26/2022 09:34 AM    Hemoglobin A1c 8.7 (H) 08/12/2022 12:41 AM        Lab Results   Component Value Date/Time    Microalbumin/Creat ratio (mg/g creat) 11,439 (H) 04/01/2021 10:00 AM    Microalbumin,urine random 151.00 04/01/2021 10:00 AM     Lab Results   Component Value Date/Time    NT pro-BNP 32,663 (H) 11/29/2022 06:55 PM    NT pro-BNP 3,665 (H) 10/22/2022 02:12 AM    NT pro-BNP 5,688 (H) 09/06/2022 09:16 AM    NT pro-BNP 11,477 (H) 08/16/2022 12:50 AM    NT pro-BNP 4,600 (H) 08/09/2022 02:13 PM     US Results (most recent):  Medication list  reviewed  Current Facility-Administered Medications   Medication Dose Route Frequency    insulin glargine (LANTUS) injection 15 Units  15 Units SubCUTAneous DAILY    losartan (COZAAR) tablet 50 mg  50 mg Oral DAILY    cloNIDine HCL (CATAPRES) tablet 0.2 mg  0.2 mg Oral QID PRN    sodium chloride (OCEAN) 0.65 % nasal squeeze bottle 2 Spray  2 Spray Both Nostrils Q2H PRN    heparin (porcine) 1,000 unit/mL injection 1,800 Units  1,800 Units Hemodialysis DIALYSIS PRN    heparin (porcine) 1,000 unit/mL injection 1,800 Units  1,800 Units Hemodialysis DIALYSIS PRN    oxyCODONE IR (ROXICODONE) tablet 5 mg  5 mg Oral Q6H PRN    fluconazole (DIFLUCAN) tablet 100 mg  100 mg Oral DAILY    0.9% sodium chloride infusion 250 mL  250 mL IntraVENous PRN    sucralfate (CARAFATE) tablet 1 g  1 g Oral AC&HS    labetaloL (NORMODYNE;TRANDATE) injection 20 mg  20 mg IntraVENous Q4H PRN    furosemide (LASIX) tablet 80 mg  80 mg Oral DAILY    epoetin rell-epbx (RETACRIT) injection 20,000 Units  20,000 Units SubCUTAneous Q MON, WED & FRI    insulin lispro (HUMALOG) injection   SubCUTAneous AC&HS    glucose chewable tablet 16 g  4 Tablet Oral PRN    glucagon (GLUCAGEN) injection 1 mg  1 mg IntraMUSCular PRN    dextrose 10 % infusion 0-250 mL  0-250 mL IntraVENous PRN    pantoprazole (PROTONIX) 40 mg in 0.9% sodium chloride 10 mL injection  40 mg IntraVENous Q12H    tamsulosin (FLOMAX) capsule 0.4 mg  0.4 mg Oral DAILY    carvediloL (COREG) tablet 12.5 mg  12.5 mg Oral BID WITH MEALS    amLODIPine (NORVASC) tablet 10 mg  10 mg Oral DAILY    finasteride (PROSCAR) tablet 5 mg  5 mg Oral DAILY    DULoxetine (CYMBALTA) capsule 60 mg  60 mg Oral DAILY    [Held by provider] aspirin chewable tablet 81 mg  81 mg Oral DAILY    hydrALAZINE (APRESOLINE) tablet 100 mg  100 mg Oral TID    pregabalin (LYRICA) capsule 75 mg  75 mg Oral QHS    ondansetron (ZOFRAN) injection 4 mg  4 mg IntraVENous Q4H PRN        Cora Baptiste MD  12/6/2022

## 2022-12-06 NOTE — PROGRESS NOTES
Problem: Self Care Deficits Care Plan (Adult)  Goal: *Acute Goals and Plan of Care (Insert Text)  Description: FUNCTIONAL STATUS PRIOR TO ADMISSION: just recently at [facility] and was intubated had MI and was on CRRT, returned to home with his mother and has been needing assist with mobility from his mother, performed ADLS on his own, mother performs IADLs, gets medial transport to dialysis, prior to previous hospital stay pt was independent with mobility and ADLS, no HH prior to admit and pt reported he was seen by OT/PT at [facility] and had no needs    HOME SUPPORT PRIOR TO ADMISSION: The patient lived with mother and uncle that have been assisting. Occupational Therapy Goals:  Initiated 11/30/2022  1. Patient will perform grooming standing with modified independence within 7 days. 2. Patient will perform upper body dressing and lower body dressing with modified independence within 7 days. 3. Patient will perform toileting with modified independence within 7 days. 4. Patient will transfer from toilet with modified independence using the least restrictive device and appropriate durable medical equipment within 7 days. 12/6/2022 1303 by TAYLA Heck/L  Outcome: Progressing Towards Goal    OCCUPATIONAL THERAPY TREATMENT  Patient: Wilmar Alonso (10 y.o. male)  Date: 12/6/2022  Diagnosis: DKA (diabetic ketoacidosis) ([facility] [address] [E11.10] <principal problem not specified>  Procedure(s) (LRB):  ESOPHAGOGASTRODUODENOSCOPY (EGD) (N/A)  ESOPHAGOGASTRODUODENAL (EGD) BIOPSY (N/A) 4 Days Post-Op  Precautions:  falls, O2  Chart, occupational therapy assessment, plan of care, and goals were reviewed. ASSESSMENT  Patient continues with skilled OT services and is not progressing towards goals as anticipated yet did cooperate fully towards goals.   This patient is currently min A to CGA for mobility without AD with POSTERIOR LOB x 3 with attempts to stand to urinate., manage gown, and stabilize L UE on grab bar for balance assist. He was unable to urinate in stand. Grooming completed in standing with anterior pelvis, trunk or UE stabilized on wall to promote improved dynamic standing balance. Patient educated on O2 tubing management as he will have NEW O2 needs, and with visual deficits, rec bright colored tape to place on O2 tubing in contrast with floor at his home. He is currently D for O2 tubing management and frustrated with this. Patient was on 2L O2 upon OT arrival, did attempt to check on room air and SpO2 dropped to upper 80's, then 1L donned with activity and he was stable 92-96% on room air. Nurse notified of this. He is a very high fall risk due to neuropathy, low vision and new O2 tubing. He will benefit from home with [facility] OT at d/c. He is not interested in any rehab facility per his report. Current Level of Function Impacting Discharge (ADLs): see above    Other factors to consider for discharge: high fall risk         PLAN :  Patient continues to benefit from skilled intervention to address the above impairments. Continue treatment per established plan of care to address goals. Recommendation for discharge: (in order for the patient to meet his/her long term goals)  Occupational therapy at least 2 days/week in the home     This discharge recommendation:  Has been made in collaboration with the attending provider and/or case management    IF patient discharges home will need the following DME: patient owns DME required for discharge       SUBJECTIVE:   Patient stated I can't see! Meaghan Carrillo    OBJECTIVE DATA SUMMARY:   Cognitive/Behavioral Status:  Neurologic State: Alert  Orientation Level: Oriented X4  Cognition: Follows commands; Appropriate for age attention/concentration; Appropriate safety awareness             Functional Mobility and Transfers for ADLs:  Bed Mobility:  Supine to Sit: Supervision  Scooting: Independent    Transfers:  Sit to Stand: Contact guard assistance  Functional Transfers  Bathroom Mobility: Minimum assistance (LOB posterior at toilet (@4 LOB with attempting to urinate adn clothing management))  Bed to Chair: Contact guard assistance    Balance:  Sitting: Intact  Standing: Impaired; Without support  Standing - Static: Constant support; Fair  Standing - Dynamic : Constant support; Fair    ADL Intervention:       Grooming  Position Performed: Standing (unsteadiness, props L UE, or R shoulder or pelvis on sink for stability')  Washing Face: Contact guard assistance  Washing Hands: Contact guard assistance  Brushing Teeth: Contact guard assistance  Brushing/Combing Hair: Contact guard assistance                        Lower Body Dressing Assistance  Antiembolitic Stockings: Set-up  Position Performed: Seated edge of bed  Cues: Don;Doff    Toileting  Bladder Hygiene: Moderate assistance (min A for LOB posterior multiple times, mod A for gown management, attemptign to stand for urination at sink --unable to urinate.)  Clothing Management: Moderate assistance  Cues: Verbal cues provided;Physical assistance for pants up;Physical assistance for pants down  Adaptive Equipment: Grab bars         Therapeutic Exercises:       Pain:  No c/o    Activity Tolerance:   Good    After treatment patient left in no apparent distress:   Sitting in chair and Call bell within reach    COMMUNICATION/COLLABORATION:   The patients plan of care was discussed with: Registered nurse.      Sarah Gates OTR/L  Time Calculation: 28 mins

## 2022-12-06 NOTE — PROGRESS NOTES
Transition of Care Plan:     RUR: 28%- [place]  Disposition: Home with [facility] has accepted     Follow up appointments: PP, Specialist  DME needed: Home oxygen - 2L- [place]. Has been delivered. Transportation at Discharge: Mother  to provide transportation  [address] or means to access home:         Medicare Letter:n/a-  [address]  Complete Medicaid  Is patient a [place] and connected with the [place]? N/A            If yes, was [place] transfer form completed and VA notified? N/A  Caregiver Contact: Mother-   Discharge Caregiver contacted prior to discharge? If pt desires  Care Conference needed?:        Pt cleared for d/c from [address] [place] [place] [address] Rd standpoint. CM checked on status of home oxygen with [place] per [employer] and home oxygen has been delivered to the home. Nurse shared pt mother has portable tank and will bring when she transport pt at d/c.    CM discussed d/c plan with pt at bedside, He agreed.       [address]  Phone: [phone]

## 2022-12-06 NOTE — PROGRESS NOTES
End of Shift Note    Bedside shift change report given to RN (oncoming nurse) by Vincent Salazar RN (offgoing nurse). Report included the following information SBAR, Kardex, ED Summary, Intake/Output, MAR, and Recent Results    Shift worked:  night     Shift summary and any significant changes:     Pt rested comfortably, remains on 2L O2 NC     Expected to be discharged pending O2 setup for home     Concerns for physician to address:  none     Zone phone for oncoming shift:   xxx       Activity:  Activity Level: Up with Assistance  Number times ambulated in hallways past shift: 0  Number of times OOB to chair past shift: 0    Cardiac:   Cardiac Monitoring: Yes      Cardiac Rhythm: Sinus Rhythm    Access:  Current line(s): PIV and HD access     Genitourinary:   Urinary status: voiding    Respiratory:   O2 Device: Nasal cannula  Chronic home O2 use?: YES - NEWLY CHRONIC  Incentive spirometer at bedside: NO       GI:  Last Bowel Movement Date: 11/30/22  Current diet:  ADULT DIET Regular; 4 carb choices (60 gm/meal); Low Potassium (Less than 3000 mg/day)  DIET ONE TIME MESSAGE  Passing flatus: YES  Tolerating current diet: YES       Pain Management:   Patient states pain is manageable on current regimen: YES    Skin:  Corey Score: 20  Interventions: increase time out of bed and nutritional support     Patient Safety:  Fall Score:  Total Score: 2  Interventions: gripper socks and pt to call before getting OOB  High Fall Risk: Yes    Length of Stay:  Expected LOS: 3d 19h  Actual LOS: 6      Vincent Salazar, RN

## 2022-12-06 NOTE — PROGRESS NOTES
0700:  Bedside shift change report given to [address] (oncoming nurse) by [facility] (offgoing nurse). Report included the following information SBAR, Kardex, MAR, and Cardiac Rhythm NSR .      0745:  Patient's blood sugar 72, given a cup of orange juice to prevent hypoglycemic episode. 1207:  Patient refused carafate at this time. States it hurts his stomach. 1234:  DISCHARGE SUMMARY FROM [facility][1] NURSE    The patient is stable for discharge. I have reviewed the discharge instructions with the patient. The patient verbalized understanding. All questions were fully answered. The patient verbalized no complaints. Hard scripts and medication handouts were given and reviewed with the patient. Appropriate educational materials and medication side effects teaching were also provided. Cardiac monitor and IV line(s) were removed. The following personal items collected during admission were returned to the patient/family  Home medications: None  Dental Appliance: Dental Appliances: None  Vision: Visual Aid: None  Hearing Aid:    Jewelry: Jewelry: None  Clothing: Clothing: None  Other Valuables: Other Valuables: None  Valuables sent to safe: There were no personal belongings, valuables or home medications left at patient's bedside,  or safe. Patient's mother states she will bring home O2.

## 2022-12-06 NOTE — DISCHARGE SUMMARY
Hospitalist Discharge Summary     Patient ID:  Jemma Wray  [id number]  38 y.o.  1980 11/29/2022    PCP on record: Melissa Turcios MD    Admit date: 11/29/2022  Discharge date and time: 12/6/2022    DISCHARGE DIAGNOSIS:  Acute hypoxic respiratory failure  D/t Diastolic Heart failure  ESRD on HD  DKA  Anemia  Esophagial candidiasis    CONSULTATIONS:  IP CONSULT TO NEPHROLOGY  IP CONSULT TO GASTROENTEROLOGY    Excerpted HPI from H&P of Zena Terrell, DO:  38 y.o. male with pertinent medical hx of DM presented with nausea and vomiting, found to be in DKA    ______________________________________________________________________  DISCHARGE SUMMARY/HOSPITAL COURSE:  for full details see H&P, daily progress notes, labs, consult notes. Acute hypoxic respiratory failure, 86% on RA, requiring 3LNC  B/l sided pleural effusion and atelectasis seen on admission CXR  Likely has Chronic Diastolic heart failure     Oxygen challenge test today  Repeat CXR: mild to moderated pulmonary edema silghtly worsened  Repeat CXR today  Procal elevated to 3.4, however no wbc/ fever, hold off on abx  IS use, encourage out of bed  Wean O2 as tolerated  Continue with lasix  Oxygen challenge after HD today     DKA  Patient recently admitted to ICU on 11/20 at [facility] with a DKA and respiratory failure requiring intubation and mechanical ventilation, CRRT and pressors  A1C 10.1, worst than 2 months ago at 7.2  Was initially on insulin drip. Anion gap closed bicarb normal  Increased lantus to 15 units  Diabetic management consult diabetic diet appreciate recs  Continue SSI     Anemia multifactorial  Anemia chronic kidney disease with esophagitis  Hemoglobin t continue to 7.2>6.7>8.9>8. 2  Will change to pantoprazole 40 IV twice daily  Status post 1 unit PRBC on 12/1  (Per discharge summary hemoglobin was around 7.4-7.6 at [facility])  GI recs appreciated  EGD 12/2-Severe grade D erosive esophagitis with candida plaques 26cm to EGJ at 41cm. -Biopsy pending  -Recommend empiric treatment of esophagus with fluconazole 200 mg daily then 100 mg from day to 2 total 14 days  cont' sucralfate     ESRD on hemodialysis  Nephrology consulted  Continue MWF schedule  On sevelamer at home     Chest pain  Less likely ACS  Troponin slightly trended up to 200   oxycodone as needed  Patient had recent LHC early November not showing any significant CAD  Dilaudid as needed     HTN   - Will c/w home Norvasc, Coreg hydralazine     CAD  - Will continue with home asa, coreg     DM Neuropathy  - C/w home Cymbalta     BPH  - C/w home proscar, tamsulosin     Hypocalcemia        _______________________________________________________________________  Patient seen and examined by me on discharge day. Pertinent Findings:  Gen:    Not in distress  Chest: Clear lungs  CVS:   Regular rhythm. No edema  Abd:  Soft, not distended, not tender  Neuro:  Alert,   _______________________________________________________________________  DISCHARGE MEDICATIONS:   Current Discharge Medication List        START taking these medications    Details   fluconazole (DIFLUCAN) 100 mg tablet Take 1 Tablet by mouth daily for 9 doses. FDA advises cautious prescribing of oral fluconazole in pregnancy. Indications: a fungal infection in the esophagus due to Candida  Qty: 9 Tablet, Refills: 0  Start date: 12/6/2022, End date: 12/15/2022      furosemide (LASIX) 40 mg tablet Take 2 Tablets by mouth daily. Qty: 30 Tablet, Refills: 0  Start date: 12/3/2022      losartan (COZAAR) 50 mg tablet Take 1 Tablet by mouth daily. Qty: 30 Tablet, Refills: 0  Start date: 12/3/2022      sucralfate (CARAFATE) 1 gram tablet Take 1 Tablet by mouth Before breakfast, lunch, dinner and at bedtime for 62 days.   Qty: 120 Tablet, Refills: 0  Start date: 12/3/2022, End date: 2/3/2023           CONTINUE these medications which have CHANGED    Details   pantoprazole (PROTONIX) 40 mg tablet Take 1 Tablet by mouth two (2) times a day for 60 days. Qty: 120 Tablet, Refills: 0  Start date: 12/3/2022, End date: 2/1/2023      insulin glargine (LANTUS) 100 unit/mL injection Take 10 units once daily at night  Qty: 1 mL, Refills: 0  Start date: 12/3/2022, End date: 2/3/2023           CONTINUE these medications which have NOT CHANGED    Details   rosuvastatin (CRESTOR) 10 mg tablet Take 10 mg by mouth nightly. sevelamer (RENAGEL) 800 mg tablet Take  by mouth three (3) times daily (with meals). pregabalin (Lyrica) 75 mg capsule Take 1 Capsule by mouth nightly. Max Daily Amount: 75 mg. Qty: 30 Capsule, Refills: 2    Associated Diagnoses: Other diabetic neurological complication associated with type 2 diabetes mellitus ([facility] [address] Recurrent falls      insulin lispro (HUMALOG) 100 unit/mL injection INITIATE CORRECTIVE INSULIN PROTOCOL (FREDA): High Sensitivity (thin, ESRD): AC (before meals), Q6H CORRECTIONAL SCALE only For Blood Sugar (mg/dl) of :           140-199=0 units          200-249=2 units 250-299=3 units 300-349=4 units 350-400=6 units 401 or greater = Call MD Give in addition to basal medications. Do Not Hold for NPO BEDTIME CORRECTIONAL sliding scale when scheduled: 200-249=1 units 250-349=2 units 350-400=3 units 401 or greater = Call MD Give in addition to basal medications. Do Not Hold for NPO Fast Acting - Administer Immediately - or within 15 minutes of start of meal, if mealtime coverage. Qty: 1 Each, Refills: 2      lancets [facility] ACHS  Qty: 1 Each, Refills: 11      Blood-Glucose Meter monitoring kit [facility]  Qty: 1 Kit, Refills: 0      glucose blood VI test strips (blood glucose test) strip [facility]  Qty: 200 Strip, Refills: 0      Insulin Syringe-Needle U-100 1 mL 28 x 5/16\" syrg 1 Syringe by SubCUTAneous route Before breakfast, lunch, dinner and at bedtime.   Qty: 200 Each, Refills: 2      DULoxetine (CYMBALTA) 60 mg capsule Take 1 capsule by mouth once daily  Qty: 30 Capsule, Refills: 0    Associated Diagnoses: Other diabetic neurological complication associated with type 1 diabetes mellitus (HCC)      naloxone (Narcan) 4 mg/actuation nasal spray Use 1 spray intranasally, then discard. Repeat with new spray every 2 min as needed for opioid overdose symptoms, alternating nostrils. Qty: 1 Each, Refills: 0    Associated Diagnoses: Chronic midline low back pain without sciatica      Ketone Blood Test strp 50 Each by Does Not Apply route as needed for PRN Reason (Other) (as needed for suspected dka). Qty: 50 Strip, Refills: 3      finasteride (PROSCAR) 5 mg tablet Take 1 Tablet by mouth in the morning. Qty: 30 Tablet, Refills: 2      Walker (Ultra-Light Rollator) misc Use while ambulating  Qty: 1 Each, Refills: 0    Associated Diagnoses: DM type 2, goal HbA1c < 7% ([address] Recurrent falls      amLODIPine (NORVASC) 10 mg tablet Take 1 Tablet by mouth daily. Qty: 90 Tablet, Refills: 1    Associated Diagnoses: Primary hypertension      Dexcom G6  misc Use with the dexcom device to check blood sugars 4 times a day  Qty: 1 Each, Refills: 0    Comments: [phone] dx code E10.65      Dexcom G6 Sensor brandi Use as directed every 10 days  Qty: 10 Each, Refills: 11    Comments: [phone] dx code E10.65      Dexcom G6 Transmitter brandi Use as directed  Qty: 10 Each, Refills: 3    Comments: [phone] dx code E10.65      Insulin Needles, Disposable, 31 gauge x 5/16\" ndle Dx code E11.65, use 6x daily  Qty: 200 Each, Refills: 11      carvediloL (COREG) 12.5 mg tablet Take 1 Tablet by mouth two (2) times daily (with meals). Qty: 60 Tablet, Refills: 1      tamsulosin (FLOMAX) 0.4 mg capsule Take 0.4 mg by mouth daily. STOP taking these medications       hydrALAZINE (APRESOLINE) 100 mg tablet Comments:   Reason for Stopping:         aspirin 81 mg chewable tablet Comments:   Reason for Stopping:                 Patient Follow Up Instructions:    Activity: Activity as tolerated  Diet: Cardiac Diet  Wound Care: None needed    Follow-up with     Follow-up Information       Follow up With Specialties Details Why Contact Info    Pablo Key NP Nurse Practitioner Follow up in 3 day(s) at 11:00 a.m. for your PCP hospital follow up. [address]      Brianne Romano MD Gastroenterology Follow up  [id number]  [phone]      [address] Follow up This is your post-acute home healthcare provider for PT, OT, and Nursing. If you don't hear from here within 24-48 hours, please give them a call. [address] [place] [address]  [address]  Follow up Please resume your outpatient MWF dialysis schedule.  [address] [facility] [address] [facility], [address]  Phone: [phone]    Yumi Holly MD Internal Medicine Physician   [address]  [phone]            ________________________________________________________________    Risk of deterioration: Moderate    Condition at Discharge:  Stable  __________________________________________________________________    Disposition  Home with family, no needs    ____________________________________________________________________    Code Status: Full Code  ___________________________________________________________________      Total time in minutes spent coordinating this discharge (includes going over instructions, follow-up, prescriptions, and preparing report for sign off to her PCP) :  32 min    Signed:  Silvio Pozo MD

## 2022-12-06 NOTE — DIABETES MGMT
[facility] [facility]  DIABETES MANAGEMENT CONSULT    Consulted by Provider for advanced nursing evaluation and care for inpatient blood glucose management. Evaluation and Action Plan   This 38year old male patient with Type 1 diabetes did not achieve BG control prior to admission as evidenced by an A1c of 10.1%. The patient was using an insulin pump but it was removed after a recent admission at [facility] per patient. The patient was told to not resume the insulin pump until he saw his endocrinologist. The patient was started on glucostabilizer for DKA during this admission. The patient was transition off using 8 units Lantus daily. The patient has Type 1 diabetes and will require the addition of meal time insulin once he is eating consistently. He is not eating much today and will be NPO after midnight for an upper endoscopy tomorrow. In correspondence with the patient's endocrinologist [facility]), she does not endorse the use of an insulin pimp for this patient. The patient should be discharged on basal/bolus regimen. The patients' BG's are labile. Consider using 8 units Lantus daily and 3 units Humalog with each meal. The patient should follow-up with Dr. Yonis kumar for future diabetes management. The patient is expected to be discharged home today with [facility] and supplemental O2. I recommend discharging on 8 units Lantus daily and 3 units Humalog with each meal. The patient should follow-up with endocrinologist  for future diabetes management. Management Rationale Action Plan   Medication   Basal needs Using 8 units/kg/D based on 0.1xkg Continue 8 units Lantus for now. The patient will require the addition of meal time insulin once eating.     Nutritional needs     Corrective insulin Using high sensitivity          Referral []        Outpatient diabetes education  [] Behavioral health  [] Inpatient nutrition services  [] Pharmacy services          Initial Presentation   Jemma Wray is a 38 y.o. male admitted from home on 11/29/2022 after experiencing nausea, vomiting and lethargy. LAB: Glucose 1,181. Creatine 6. 11. GFR 11. Anion Gap 23. A1c 10.1%  CXR:  CT:  MRI:    HX:   Past Medical History:   Diagnosis Date    Bipolar 1 disorder, depressed ([facility] [address]     Bipolar disorder ([address]     Chronic kidney disease, stage 3a ([address]     Depression     Diabetes ([address]     DKA, type 1 ([address] 1/27/2013    diagnosed age 23    DKA, type 1 ([address]     H/O noncompliance with medical treatment, presenting hazards to health     MRSA (methicillin resistant staph aureus) culture positive     MRSA (methicillin resistant Staphylococcus aureus)     Face    Noncompliance with medication regimen     Second hand smoke exposure     Seizure ([address]     Seizures ([address] 2006 or 2007    one episode during jail        INITIAL DX:   DKA (diabetic ketoacidosis) ([facility] [address] [E11.10]     Current Treatment     TX: BP management. Insulin. Protonix. Lyrica. Cymbalta. Hospital Course   Clinical progress has been uncomplicated. Diabetes History   The patient reports a 20 year history of Type 1 diabetes. He has a positive family hx of diabetes (mom & niece). He states today that he sees Dr. Yaritza Diamond (endocrinologist) for his diabetes. He was recently started on a Tandem insulin pump on 07/15/22. Unsure of which CGM is in use.  The patient had a recent admission at [facility] and was reportedly told to not resume the insulin pump until he is evaluated by his endocrinologist.        Diabetes-related Medical History  Acute complications  DKA  Neurological complications  Gastroparesis and Peripheral neuropathy  Microvascular disease  Nephropathy  Macrovascular disease  Myocardial infarction      Diabetes Medication History  Key Antihyperglycemic Medications               insulin glargine (LANTUS) 100 unit/mL injection (Taking) Take 10 units once daily at night    insulin lispro (HUMALOG) 100 unit/mL injection INITIATE CORRECTIVE INSULIN PROTOCOL (FREDA): High Sensitivity (thin, ESRD): AC (before meals), Q6H CORRECTIONAL SCALE only For Blood Sugar (mg/dl) of :           140-199=0 units          200-249=2 units 250-299=3 units 300-349=4 units 350-400=6 units 401 or greater = Call MD Give in addition to basal medications. Do Not Hold for NPO BEDTIME CORRECTIONAL sliding scale when scheduled: 200-249=1 units 250-349=2 units 350-400=3 units 401 or greater = Call MD Give in addition to basal medications. Do Not Hold for NPO Fast Acting - Administer Immediately - or within 15 minutes of start of meal, if mealtime coverage. Diabetes self-management practices:   Eating pattern Deferred                   Physical activity pattern              [x]        Not employing a physical activity program to control BG     Monitoring pattern              [x]        Testing BGs sufficiently to inform self-management adjustments                ( Using Dexcom G6)  Taking medications pattern ( not using insulin pump at this time)  [x]        Affordable  Social determinants of health impacting diabetes self-management practices   Concerned that you need to know more about how to stay healthy with diabetes  Overall evaluation:               [x]        Unknown if achieving A1c target with drug therapy & self-care practices      Subjective   \" I supposed to going home\"     Objective   Physical exam  General Normal weight male in no acute distress.  Conversant and cooperative  Neuro  Alert, oriented   Vital Signs Visit Vitals  /84 (BP 1 Location: Right upper arm, BP Patient Position: At rest)   Pulse 84   Temp 97.7 °F (36.5 °C)   Resp 18   Ht 5' 8\" (1.727 m)   Wt 80.6 kg (177 lb 11.1 oz)   SpO2 93%   BMI 27.02 kg/m²         Laboratory  Recent Labs     12/06/22  0336 12/05/22  0316 12/04/22  0341   * 144* 174*   AGAP 4* 5 7   WBC 7.0 8.9 9.5   CREA 3.38* 4.50* 3.73*         Factors impacting BG management  Factor Dose Comments   Nutrition:  Standard meals     60 grams/meal Ate breakfast    Pain PRN Dilaudid    Other:   Kidney function       CKD   Hemodialysis patient     Blood glucose pattern    Significant diabetes-related events over the past 24-72 hours      Assessment and Nursing Intervention   Nursing Diagnosis Risk for unstable blood glucose pattern   Nursing Intervention Domain 5259 Decision-making Support   Nursing Interventions Examined current inpatient diabetes/blood glucose control   Explored factors facilitating and impeding inpatient management  Explored corrective strategies with patient and responsible inpatient provider   Informed patient of rational for insulin strategy while hospitalized           Shaila Hansen CNS  Diabetes Clinical Nurse Specialist  Program for Diabetes Health  Access via [facility]

## 2022-12-06 NOTE — DISCHARGE INSTRUCTIONS
HOSPITALIST DISCHARGE INSTRUCTIONS    NAME: Pauline Mendez   :  1980   MRN:  [id number]     Date/Time:  2022 11:56 AM    ADMIT DATE: 2022     DISCHARGE DATE: 2022     DISCHARGE DIAGNOSIS:  Acute hypoxic respiratory failure  Likely d/t Diastolic heart failure  ESRD on HD  DKA  Anemia  Esophageal candidiasis    MEDICATIONS:  As per medication reconciliation  list  It is important that you take the medication exactly as they are prescribed. Keep your medication in the bottles provided by the pharmacist and keep a list of the medication names, dosages, and times to be taken in your wallet. Do not take other medications without consulting your doctor. Pain Management: per above medications    What to do at Home    Recommended diet:  Diabetic Diet    Recommended activity: Activity as tolerated    If you have questions regarding the hospital related prescriptions or hospital related issues please call at [phone]. If you experience any of the following symptoms then please call your primary care physician or return to the emergency room if you cannot get hold of your doctor:  Fever, chills, nausea, vomiting, diarrhea, change in mentation, falling, bleeding, shortness of breath    Follow Up: Your PMD in 1 week      Information obtained by :  I understand that if any problems occur once I am at home I am to contact my physician. I understand and acknowledge receipt of the instructions indicated above.                                                                                                                                            Physician's or R.N.'s Signature                                                                  Date/Time                                                                                                                                              Patient or Representative Signature Date/Time

## 2022-12-06 NOTE — PROGRESS NOTES
Gastroenterology Progress Note  Mukul Church MD f)    12/6/2022    Admit Date: 11/29/2022    Subjective: Follow up for: Anemia, vomiting, esophagitis        Path is still not back yet  Tolerating a regular diabetic diet.   EGD done 12.2.22 by Dr Darby Lights: severe grade D esophagitis with candida plaques, no bleeding, multiple bx taken, normal stomach and duodenum    Lab Results   Component Value Date/Time    WBC 7.0 12/06/2022 03:36 AM    Hemoglobin (POC) 18.4 (H) 03/07/2017 09:00 AM    HGB 7.8 (L) 12/06/2022 03:36 AM    Hematocrit (POC) 41 10/22/2020 01:07 PM    HCT 24.5 (L) 12/06/2022 03:36 AM    PLATELET 685 (H) 79/25/0527 03:36 AM    MCV 84.5 12/06/2022 03:36 AM       Current Facility-Administered Medications   Medication Dose Route Frequency    insulin glargine (LANTUS) injection 15 Units  15 Units SubCUTAneous DAILY    losartan (COZAAR) tablet 50 mg  50 mg Oral DAILY    cloNIDine HCL (CATAPRES) tablet 0.2 mg  0.2 mg Oral QID PRN    sodium chloride (OCEAN) 0.65 % nasal squeeze bottle 2 Spray  2 Spray Both Nostrils Q2H PRN    heparin (porcine) 1,000 unit/mL injection 1,800 Units  1,800 Units Hemodialysis DIALYSIS PRN    heparin (porcine) 1,000 unit/mL injection 1,800 Units  1,800 Units Hemodialysis DIALYSIS PRN    oxyCODONE IR (ROXICODONE) tablet 5 mg  5 mg Oral Q6H PRN    fluconazole (DIFLUCAN) tablet 100 mg  100 mg Oral DAILY    0.9% sodium chloride infusion 250 mL  250 mL IntraVENous PRN    sucralfate (CARAFATE) tablet 1 g  1 g Oral AC&HS    labetaloL (NORMODYNE;TRANDATE) injection 20 mg  20 mg IntraVENous Q4H PRN    furosemide (LASIX) tablet 80 mg  80 mg Oral DAILY    epoetin rell-epbx (RETACRIT) injection 20,000 Units  20,000 Units SubCUTAneous Q MON, WED & FRI    insulin lispro (HUMALOG) injection   SubCUTAneous AC&HS    glucose chewable tablet 16 g  4 Tablet Oral PRN    glucagon (GLUCAGEN) injection 1 mg  1 mg IntraMUSCular PRN    dextrose 10 % infusion 0-250 mL  0-250 mL IntraVENous PRN pantoprazole (PROTONIX) 40 mg in 0.9% sodium chloride 10 mL injection  40 mg IntraVENous Q12H    tamsulosin (FLOMAX) capsule 0.4 mg  0.4 mg Oral DAILY    carvediloL (COREG) tablet 12.5 mg  12.5 mg Oral BID WITH MEALS    amLODIPine (NORVASC) tablet 10 mg  10 mg Oral DAILY    finasteride (PROSCAR) tablet 5 mg  5 mg Oral DAILY    DULoxetine (CYMBALTA) capsule 60 mg  60 mg Oral DAILY    [Held by provider] aspirin chewable tablet 81 mg  81 mg Oral DAILY    hydrALAZINE (APRESOLINE) tablet 100 mg  100 mg Oral TID    pregabalin (LYRICA) capsule 75 mg  75 mg Oral QHS    ondansetron (ZOFRAN) injection 4 mg  4 mg IntraVENous Q4H PRN        Objective:     Blood pressure 129/84, pulse 78, temperature 97.7 °F (36.5 °C), resp. rate 18, height 5' 8\" (1.727 m), weight 80.6 kg (177 lb 11.1 oz), SpO2 95 %.     12/06 0701 - 12/06 1900  In: 240 [P.O.:240]  Out: 100 [Urine:100]    12/04 1901 - 12/06 0700  In: -   Out: 2000         Physical Examination:       General: white male in nad, getting dialysis  HEENT: sclera anicteric  Heart: RRR  Lungs: CTA B  Abd: obese, ND, soft, NT  Ext: no edema  Neuro: A&O x 3    Data Review    Recent Results (from the past 24 hour(s))   GLUCOSE, POC    Collection Time: 12/05/22  4:13 PM   Result Value Ref Range    Glucose (POC) 292 (H) 65 - 117 mg/dL    Performed by Gabriela Hanley PCT    GLUCOSE, POC    Collection Time: 12/05/22  9:13 PM   Result Value Ref Range    Glucose (POC) 257 (H) 65 - 117 mg/dL    Performed by Ayden Uribe (PCT)    CBC WITH AUTOMATED DIFF    Collection Time: 12/06/22  3:36 AM   Result Value Ref Range    WBC 7.0 4.1 - 11.1 K/uL    RBC 2.90 (L) 4.10 - 5.70 M/uL    HGB 7.8 (L) 12.1 - 17.0 g/dL    HCT 24.5 (L) 36.6 - 50.3 %    MCV 84.5 80.0 - 99.0 FL    MCH 26.9 26.0 - 34.0 PG    MCHC 31.8 30.0 - 36.5 g/dL    RDW 19.5 (H) 11.5 - 14.5 %    PLATELET 398 (H) 384 - 400 K/uL    MPV 10.0 8.9 - 12.9 FL    NRBC 0.4 (H) 0  WBC    ABSOLUTE NRBC 0.03 (H) 0.00 - 0.01 K/uL NEUTROPHILS 57 32 - 75 %    LYMPHOCYTES 22 12 - 49 %    MONOCYTES 15 (H) 5 - 13 %    EOSINOPHILS 3 0 - 7 %    BASOPHILS 1 0 - 1 %    IMMATURE GRANULOCYTES 2 (H) 0.0 - 0.5 %    ABS. NEUTROPHILS 4.1 1.8 - 8.0 K/UL    ABS. LYMPHOCYTES 1.5 0.8 - 3.5 K/UL    ABS. MONOCYTES 1.0 0.0 - 1.0 K/UL    ABS. EOSINOPHILS 0.2 0.0 - 0.4 K/UL    ABS. BASOPHILS 0.1 0.0 - 0.1 K/UL    ABS. IMM. GRANS. 0.1 (H) 0.00 - 0.04 K/UL    DF AUTOMATED     METABOLIC PANEL, BASIC    Collection Time: 12/06/22  3:36 AM   Result Value Ref Range    Sodium 135 (L) 136 - 145 mmol/L    Potassium 3.6 3.5 - 5.1 mmol/L    Chloride 97 97 - 108 mmol/L    CO2 34 (H) 21 - 32 mmol/L    Anion gap 4 (L) 5 - 15 mmol/L    Glucose 102 (H) 65 - 100 mg/dL    BUN 18 6 - 20 MG/DL    Creatinine 3.38 (H) 0.70 - 1.30 MG/DL    BUN/Creatinine ratio 5 (L) 12 - 20      eGFR 23 (L) >60 ml/min/1.73m2    Calcium 7.8 (L) 8.5 - 10.1 MG/DL   MAGNESIUM    Collection Time: 12/06/22  3:36 AM   Result Value Ref Range    Magnesium 1.8 1.6 - 2.4 mg/dL   GLUCOSE, POC    Collection Time: 12/06/22  7:08 AM   Result Value Ref Range    Glucose (POC) 72 65 - 117 mg/dL    Performed by Charo Yip (PCT)    GLUCOSE, POC    Collection Time: 12/06/22 11:25 AM   Result Value Ref Range    Glucose (POC) 91 65 - 117 mg/dL    Performed by Emeterio Bamberger PCT      Recent Labs     12/06/22  0336 12/05/22 0316   WBC 7.0 8.9   HGB 7.8* 8.1*   HCT 24.5* 25.2*   * 379       Recent Labs     12/06/22  0336 12/05/22 0316 12/04/22  0341   * 130* 130*   K 3.6 4.0 4.1   CL 97 96* 96*   CO2 34* 29 27   BUN 18 33* 24*   CREA 3.38* 4.50* 3.73*   * 144* 174*   CA 7.8* 8.1* 7.9*   MG 1.8 1.8 1.8       No results for input(s): AP, TBIL, TP, ALB, GLOB, GGT, AML, LPSE in the last 72 hours. No lab exists for component: SGOT, GPT, AMYP, HLPSE  No results for input(s): INR, PTP, APTT, INREXT, INREXT in the last 72 hours. No results for input(s): FE, TIBC, PSAT, FERR in the last 72 hours.      No results found for: FOL, RBCF   No results for input(s): PH, PCO2, PO2 in the last 72 hours. No results for input(s): CPK, CKNDX, TROIQ in the last 72 hours. No lab exists for component: CPKMB  Lab Results   Component Value Date/Time    Cholesterol, total 192 11/15/2021 12:01 PM    HDL Cholesterol 61 11/15/2021 12:01 PM    LDL, calculated 89.4 11/15/2021 12:01 PM    Triglyceride 208 (H) 11/15/2021 12:01 PM    CHOL/HDL Ratio 3.1 11/15/2021 12:01 PM     No components found for: [place]  Lab Results   Component Value Date/Time    Color YELLOW/STRAW 11/29/2022 07:56 PM    Appearance CLEAR 11/29/2022 07:56 PM    Specific gravity 1.022 11/29/2022 07:56 PM    Specific gravity 1.020 05/08/2022 09:06 PM    pH (UA) 6.5 11/29/2022 07:56 PM    Protein 300 (A) 11/29/2022 07:56 PM    Glucose >1,000 (A) 11/29/2022 07:56 PM    Ketone 15 (A) 11/29/2022 07:56 PM    Bilirubin Negative 11/29/2022 07:56 PM    Urobilinogen 0.2 11/29/2022 07:56 PM    Nitrites Negative 11/29/2022 07:56 PM    Leukocyte Esterase Negative 11/29/2022 07:56 PM    Epithelial cells FEW 11/29/2022 07:56 PM    Bacteria Negative 11/29/2022 07:56 PM    WBC 0-4 11/29/2022 07:56 PM    RBC 0-5 11/29/2022 07:56 PM        ROS: -CP, SOB, Dysuria, palpitations, cough. Assessment:  Esophagitis, grade D  Anemia  DM  Vomiting:resolved     Plan/Discussion:     His hemoglobin is stable and he is on  pantoprazole 40mg twice daily plus sucralfate QID. EGD on 12/2/22 (Dr Kirk Mcbride) with severe LA grade D erosive esophagitis with candida plaques from  26 cm to EGJ seen  at 41cm. We await biopsy results. He is on fluconazole for total of 14 days and on PO sucralfate. We would repeat his EGD in ~8 weeks as OP.    Stable for D/C from GI perspective        Roseann Coello MD    12/6/2022

## 2022-12-07 ENCOUNTER — PATIENT OUTREACH (OUTPATIENT)
Dept: CASE MANAGEMENT | Age: 42
End: 2022-12-07

## 2022-12-07 NOTE — PROGRESS NOTES
Care Transitions Initial Call    Call within 2 business days of discharge: Yes     Patient: Janiya Razo Patient : 1980 MRN: [id number]    Last Discharge [address]       Date Complaint Diagnosis Description Type Department Provider    22 Vomiting Diabetic ketoacidosis without coma associated with type 1 diabetes mellitus ([facility] [address] . .. ED to Hosp-Admission (Discharged) (ADMIT) [address] Alvarez Montanez MD; Marvin Julian . .. Was this an external facility discharge? No     Challenges to be reviewed by the provider   Additional needs identified to be addressed with provider:   [facility] dialysis chair time [id number] ride did not pick him up today, denies sob  Compliant with 2L of o2 via NC  Repeat EGD in 8 weeks as OP     Method of communication with provider : none    Discussed COVID-19 related testing which was not done at this time. Advance Care Planning:   Does patient have an Advance Directive:  will address on next outreach . Inpatient Readmission Risk score: Unplanned Readmit Risk Score: 38.7    Was this a readmission? yes   Patient stated reason for the admission: vomiting    Patients top risk factors for readmission: ineffective coping, lack of knowledge about disease, level of motivation, medical condition-dka, medication management, and transportation   Interventions to address risk factors: Scheduled appointment with PCP- and Obtained and reviewed discharge summary and/or continuity of care documents    Care Transition Nurse (CTN) contacted the patient by telephone to perform post hospital discharge assessment. Verified name and  with patient as identifiers. Provided introduction to self, and explanation of the CTN role. Reports non radiating chest pain located in the middle of his chest. Reports he had chest pain in the hospital. He rates his chest pain 8 out of 10 on pain scale. States his chest pain is felt after he eats.  Denies worsening sob, headache, dizziness, lightheadedness. No bladder, bowel concerns. Compliant with 2 L of oxygen. With oxygen saturation of 92-99%. Reviewed DM diet and monitoring BS. Pt states his performs home BS monitoring QID. CTN reviewed discharge instructions, medical action plan and red flags with patient who verbalized understanding. Were discharge instructions available to patient? yes. Reviewed appropriate site of care based on symptoms and resources available to patient including: PCP, Specialist, and Home Health. Patient given an opportunity to ask questions and does not have any further questions or concerns at this time. The patient agrees to contact the PCP office for questions related to their healthcare. Medication reconciliation was performed with patient, who verbalizes understanding of administration of home medications. Referral to Pharm D needed: no     Home Health/Outpatient orders at discharge: home health care, PT, OT, and [address]: [employer]  Date of initial visit: 12/8/2022    Durable Medical Equipment ordered at discharge: [address] received: yes    Was patient discharged with a pulse oximeter? no    Discussed follow-up appointments. If no appointment was previously scheduled, appointment scheduling offered:  NA. Appt arranged for 12/8 . Is follow up appointment scheduled within 7 days of discharge? yes. [address] follow up appointment(s):   Future Appointments   Date Time Provider Caryl Foley   12/8/2022 11:00 AM Lisa Lundberg NP [facility]   12/8/2022 12:00 PM Krystian Shaw, ASAD [address] [place] [address]   2/2/2023 11:30 AM Gaurav Houser MD [facility]     [facility] follow up appointment(s): NA    Plan for follow-up call in 5-7 days based on severity of symptoms and risk factors.   Plan for next call: self management-BS monitoring, follow up appointment-pcp, and community resources-  CTN provided contact information for future needs. Goals Addressed                   This Visit's Progress     COMPLETED: Establish PCP relationships and regularly scheduled appointments. COMPLETED: Prevent complications post hospitalization. COMPLETED: Prevent complications post hospitalization. 8/23/22    Patient to attend pcp appt on 8/25/22  Patient to use dexacom and pump to control blood sugars and check bs 5 times daily with meals, fasting in the am and before bed. Ctn contacted [employer] meals and restarted meal delivery for patient. Ctn gave patient number for senior connections rides and lets go service for transportation needs. [facility] sent to follow patient and soc on 8/23/22  Patient with hansen and instructed wash daily with soap and water, empty bag daily and monitor urine color and clarity  Patient to add sodium bicarb tablets and take as ordered. Ctn to follow up in one week. Bandar Bright RN    9/13/22    Patient to attend pcp appt on 9/13/22  Patient to use dexacom and pump to control blood sugars and check bs 5 times daily with meals, fasting in the am and before bed. [facility] sent to follow patient and georgina  Patient to monitor dialysis catheter for signs of infection including redness, swelling, drainage, fever  Patient with hansen and instructed wash daily with soap and water, empty bag daily and monitor urine color and clarity  Patient to add sodium bicarb tablets and take as ordered. Ctn to follow up in one week. Bandar Bright RN    9/29/22  Patient to use dexacom and pump to control blood sugars and check bs 5 times daily with meals, fasting in the am and before bed.    Patient to monitor dialysis catheter for signs of infection including redness, swelling, drainage, fever  Patient to attend pcp appt on 10/4/22  Patient to attend diabetes education on 9/30/22  Patient to adhere to fluid restriction of 32 oz a day and dietary restrictions related to dialysis including no dairy, no soda, only brewed tea, low phosphorus and low potassium foods   Ctn to follow up in one week. Asif Mendoza RN       Prevent complications post hospitalization. 12/07/22  Will attend follow up appt with PCP scheduled 12/8  Pt agrees to provide this CTN with a report of FBS at next scheduled follow up call in 7 days.    Will report compliance with oxygen  Will complete abx therapy  Will monitor for worsening chest pain and return to ER  Will arrange fu appt with GI/endo  Pt will remain out of the hospital or ER for remainder of MELL period

## 2022-12-08 ENCOUNTER — HOME CARE VISIT (OUTPATIENT)
Dept: HOME HEALTH SERVICES | Facility: HOME HEALTH | Age: 42
End: 2022-12-08

## 2022-12-11 ENCOUNTER — HOME CARE VISIT (OUTPATIENT)
Dept: SCHEDULING | Facility: HOME HEALTH | Age: 42
End: 2022-12-11
Payer: MEDICAID

## 2022-12-11 ENCOUNTER — HOME CARE VISIT (OUTPATIENT)
Dept: HOME HEALTH SERVICES | Facility: HOME HEALTH | Age: 42
End: 2022-12-11
Payer: MEDICAID

## 2022-12-11 PROCEDURE — G0299 HHS/HOSPICE OF RN EA 15 MIN: HCPCS

## 2022-12-13 VITALS
HEART RATE: 84 BPM | RESPIRATION RATE: 18 BRPM | TEMPERATURE: 98.9 F | OXYGEN SATURATION: 98 % | DIASTOLIC BLOOD PRESSURE: 78 MMHG | SYSTOLIC BLOOD PRESSURE: 134 MMHG

## 2022-12-14 ENCOUNTER — PATIENT OUTREACH (OUTPATIENT)
Dept: CASE MANAGEMENT | Age: 42
End: 2022-12-14

## 2022-12-14 NOTE — PROGRESS NOTES
Care Transitions Follow Up Call    Care Transition Nurse (CTN) contacted the patient by telephone to follow up after admission on 11/29/2022. Reports he had dialysis this morning and feeling fatigue now. Reports FBS today 134 mg/dL. Reports chest congestion. Discussed increasing hydration to assist with thinning his secretions. Endorses +cough. Denies sob. Reports compliance with oxygen at 2L via NC. Reports oxygen saturation average 91-98% with O2. Addressed changes since last contact:  FBS avg 100-200 mg/dL  Follow up appointment completed? no.   Was follow up appointment scheduled within 7 days of discharge? no.     CTN reviewed medical action plan and red flags with patient and discussed any barriers to care and/or understanding of plan of care after discharge. Discussed appropriate site of care based on symptoms and resources available to patient including: PCP, Specialist, and Home Health. The patient agrees to contact the PCP office for questions related to their healthcare. Patients top risk factors for readmission: medical condition-DKA, medication management, and utilization of services   Interventions to address risk factors: Scheduled appointment with PCP-1/10/2023    [facility] follow up appointment(s):   Future Appointments   Date Time Provider Caryl Foley   12/15/2022 To Be Determined Colonel Claudia PT [facility]   12/15/2022 11:00 AM Kelin Baker [facility]   12/15/2022 To Be Determined Christopher Portillo [facility]   12/20/2022 To Be Determined Sunitha Longoria RN [facility]   12/22/2022 To Be Determined Yun Goodwin LPN [address] [facility]   1/10/2023  2:15 PM Gaurav Houser MD [facility]   2/2/2023 11:30 AM MD ALEKSANDAR Pisano [facility] AMB     Non-[facility] follow up appointment(s): NA    CTN provided contact information for future needs. Plan for follow-up call in 5-7 days based on severity of symptoms and risk factors.   Plan for next call: self management-BS       Goals Addressed                   This Visit's Progress     Prevent complications post hospitalization. On track       12/07/22  Will attend follow up appt with PCP scheduled 12/8  Pt agrees to provide this CTN with a report of FBS at next scheduled follow up call in 7 days.    Will report compliance with oxygen  Will complete abx therapy  Will monitor for worsening chest pain and return to ER  Will arrange fu appt with GI/endo  Pt will remain out of the hospital or ER for remainder of MELL period    12/14/22  PCP appt scheduled 1/10  Reports average -200 mg/dL  Reports compliance with oxygen at 2L  Completed abx therapy  Has not arranged fu appts with GI/endo

## 2022-12-15 ENCOUNTER — HOME CARE VISIT (OUTPATIENT)
Dept: SCHEDULING | Facility: HOME HEALTH | Age: 42
End: 2022-12-15
Payer: MEDICAID

## 2022-12-15 ENCOUNTER — TELEPHONE (OUTPATIENT)
Dept: PRIMARY CARE CLINIC | Age: 42
End: 2022-12-15

## 2022-12-15 VITALS
TEMPERATURE: 97.5 F | HEART RATE: 81 BPM | RESPIRATION RATE: 16 BRPM | OXYGEN SATURATION: 97 % | SYSTOLIC BLOOD PRESSURE: 144 MMHG | DIASTOLIC BLOOD PRESSURE: 68 MMHG

## 2022-12-15 VITALS
DIASTOLIC BLOOD PRESSURE: 68 MMHG | OXYGEN SATURATION: 97 % | TEMPERATURE: 97.5 F | HEART RATE: 81 BPM | SYSTOLIC BLOOD PRESSURE: 144 MMHG

## 2022-12-15 PROCEDURE — G0152 HHCP-SERV OF OT,EA 15 MIN: HCPCS

## 2022-12-15 PROCEDURE — G0151 HHCP-SERV OF PT,EA 15 MIN: HCPCS

## 2022-12-15 PROCEDURE — G0300 HHS/HOSPICE OF LPN EA 15 MIN: HCPCS

## 2022-12-15 NOTE — TELEPHONE ENCOUNTER
Hira (home health nurse) reported several issues on behalf of the patient. Patient has been having chest pain several months and has been to the hospital several times as a result. Blood pressure is currently 144/68. And patient has had two falls in the last week.     No other comments at this time, but any questions can go to [facility]: [phone]

## 2022-12-16 VITALS
HEART RATE: 81 BPM | DIASTOLIC BLOOD PRESSURE: 68 MMHG | SYSTOLIC BLOOD PRESSURE: 144 MMHG | RESPIRATION RATE: 20 BRPM | TEMPERATURE: 97.5 F | OXYGEN SATURATION: 97 %

## 2022-12-19 NOTE — TELEPHONE ENCOUNTER
Patient aware to go to the ER for chest pain,  denies chest pain, no falls.      Patient stated he went to   [facility] ER 11/29/2022,     Appointment with Dr. Sanam Dove on 1/10/2023 at 2:15 pm.

## 2022-12-20 ENCOUNTER — HOME CARE VISIT (OUTPATIENT)
Dept: SCHEDULING | Facility: HOME HEALTH | Age: 42
End: 2022-12-20
Payer: MEDICAID

## 2022-12-21 ENCOUNTER — PATIENT OUTREACH (OUTPATIENT)
Dept: CASE MANAGEMENT | Age: 42
End: 2022-12-21

## 2022-12-21 ENCOUNTER — HOSPITAL ENCOUNTER (OUTPATIENT)
Dept: PREADMISSION TESTING | Age: 42
Discharge: HOME | End: 2022-12-21

## 2022-12-21 VITALS
TEMPERATURE: 98.1 F | BODY MASS INDEX: 25.47 KG/M2 | WEIGHT: 171.96 LBS | SYSTOLIC BLOOD PRESSURE: 176 MMHG | HEART RATE: 93 BPM | HEIGHT: 69 IN | DIASTOLIC BLOOD PRESSURE: 105 MMHG

## 2022-12-21 RX ORDER — SODIUM BICARBONATE 650 MG/1
650 TABLET ORAL 3 TIMES DAILY
COMMUNITY

## 2022-12-21 RX ORDER — FLUCONAZOLE 100 MG/1
100 TABLET ORAL DAILY
COMMUNITY

## 2022-12-21 RX ORDER — HYDRALAZINE HYDROCHLORIDE 100 MG/1
100 TABLET, FILM COATED ORAL 3 TIMES DAILY
COMMUNITY

## 2022-12-21 NOTE — PERIOP NOTES
[address] INSTRUCTIONS    Surgery Date:   12/29/22    Your surgeon's office or [facility] staff will call you between 4 PM- 8 PM the day before surgery with your arrival time. If your surgery is on a Monday, you will receive a call the preceding Friday. Please report to [facility] Patient Access/Admitting on the 1st floor. Bring your insurance card, photo identification, and any copayment ( if applicable). If you are going home the same day of your surgery, you must have a responsible adult to drive you home. You need to have a responsible adult to stay with you the first 24 hours after surgery and you should not drive a car for 24 hours following your surgery. Nothing to eat or drink after midnight the night before surgery. This includes no water, gum, mints, coffee, juice, etc.  Please note special instructions, if applicable, below for medications. Do NOT drink alcohol or smoke 24 hours before surgery. STOP smoking for 14 days prior as it helps with breathing and healing after surgery. If you are being admitted to the hospital, please leave personal belongings/luggage in your car until you have an assigned hospital room number. Please wear comfortable clothes. Wear your glasses instead of contacts. We ask that all money, jewelry and valuables be left at home. Wear no make up, particularly mascara, the day of surgery. All body piercings, rings, and jewelry need to be removed and left at home. Please remove any nail polish or artificial nails from your fingernails. Please wear your hair loose or down. Please no pony-tails, buns, or any metal hair accessories. If you shower the morning of surgery, please do not apply any lotions or powders afterwards. You may wear deodorant, unless having breast surgery. Do not shave any body area within 24 hours of your surgery. Please follow all instructions to avoid any potential surgical cancellation.   Should your physical condition change, (i.e. fever, cold, flu, etc.) please notify your surgeon as soon as possible. It is important to be on time. If a situation occurs where you may be delayed, please call:  [phone] / [phone] on the day of surgery. The Preadmission Testing staff can be reached at [phone]. Special instructions: ANY INSTRUCTIONS FROM DR. SUERO'S OFFICE  [phone]       Current Outpatient Medications   Medication Sig    hydrALAZINE (APRESOLINE) 100 mg tablet Take 100 mg by mouth three (3) times daily. sodium bicarbonate 650 mg tablet Take 650 mg by mouth three (3) times daily. fluconazole (DIFLUCAN) 100 mg tablet Take 100 mg by mouth daily. FDA advises cautious prescribing of oral fluconazole in pregnancy. OXYGEN-AIR DELIVERY SYSTEMS Take 2 L/min by inhalation continuous. OXYGEN-AIR DELIVERY SYSTEMS Take 2 L/min by inhalation continuous. furosemide (LASIX) 40 mg tablet Take 2 Tablets by mouth daily. losartan (COZAAR) 50 mg tablet Take 1 Tablet by mouth daily. sucralfate (CARAFATE) 1 gram tablet Take 1 Tablet by mouth Before breakfast, lunch, dinner and at bedtime for 62 days. (Patient taking differently: Take 1 g by mouth daily.)    insulin glargine (LANTUS) 100 unit/mL injection Take 10 units once daily at night    rosuvastatin (CRESTOR) 10 mg tablet Take 40 mg by mouth nightly. sevelamer (RENAGEL) 800 mg tablet Take 1,600 mg by mouth three (3) times daily (with meals). pregabalin (Lyrica) 75 mg capsule Take 1 Capsule by mouth nightly. Max Daily Amount: 75 mg. (Patient taking differently: Take 75 mg by mouth daily.)    insulin lispro (HUMALOG) 100 unit/mL injection INITIATE CORRECTIVE INSULIN PROTOCOL (FREDA): High Sensitivity (thin, ESRD): AC (before meals), Q6H CORRECTIONAL SCALE only For Blood Sugar (mg/dl) of :           140-199=0 units          200-249=2 units 250-299=3 units 300-349=4 units 350-400=6 units 401 or greater = Call MD Give in addition to basal medications. Do Not Hold for NPO BEDTIME CORRECTIONAL sliding scale when scheduled: 200-249=1 units 250-349=2 units 350-400=3 units 401 or greater = Call MD Give in addition to basal medications. Do Not Hold for NPO Fast Acting - Administer Immediately - or within 15 minutes of start of meal, if mealtime coverage. DULoxetine (CYMBALTA) 60 mg capsule Take 1 capsule by mouth once daily    finasteride (PROSCAR) 5 mg tablet Take 1 Tablet by mouth in the morning. amLODIPine (NORVASC) 10 mg tablet Take 1 Tablet by mouth daily. carvediloL (COREG) 12.5 mg tablet Take 1 Tablet by mouth two (2) times daily (with meals). tamsulosin (FLOMAX) 0.4 mg capsule Take 0.4 mg by mouth daily. pantoprazole (PROTONIX) 40 mg tablet Take 1 Tablet by mouth two (2) times a day for 60 days. (Patient taking differently: Take 40 mg by mouth daily.)    lancets misc ACHS    Blood-Glucose Meter monitoring kit [facility]    glucose blood VI test strips (blood glucose test) strip [facility]    Insulin Syringe-Needle U-100 1 mL 28 x 5/16\" syrg 1 Syringe by SubCUTAneous route Before breakfast, lunch, dinner and at bedtime. naloxone (Narcan) 4 mg/actuation nasal spray Use 1 spray intranasally, then discard. Repeat with new spray every 2 min as needed for opioid overdose symptoms, alternating nostrils. Ketone Blood Test strp 50 Each by Does Not Apply route as needed for PRN Reason (Other) (as needed for suspected dka). Walker (Ultra-Light Rollator) misc Use while ambulating    Dexcom G6  misc Use with the dexcom device to check blood sugars 4 times a day    Dexcom G6 Sensor brandi Use as directed every 10 days    Dexcom G6 Transmitter brandi Use as directed    Insulin Needles, Disposable, 31 gauge x 5/16\" ndle Dx code E11.65, use 6x daily     No current facility-administered medications for this encounter.        YOU MUST ONLY TAKE THESE MEDICATIONS THE MORNING OF SURGERY WITH A SIP OF WATER: HYDRALAZINE, FLUCONAZOLE, CARVIDELOL, SUCRALFATE, PREGABALIN (Ezchon Herndon), AMLODIPINE , PANTOPRAZOLE, DULOXETINE  MEDICATIONS TO TAKE THE MORNING OF SURGERY ONLY IF NEEDED: TYLENOL  HOLD these prescription medications BEFORE Surgery: SKIP LOSARTAN, FINASTERIDE, TAMSULOSIN AND FUROSEMIDE THE DAY OF SURGERY. Ask your surgeon/prescribing physician about when/if to STOP taking these medications: INSULIN , CHECK WITH SURGEON OR PRESCRIBING DOCTOR CARTER LEE   Stop all vitamins, herbal medicines and Aspirin containing products 7 days prior to surgery. Stop any non-steroidal anti-inflammatory drugs (i.e. Ibuprofen, Naproxen, Advil, Aleve) 3 days before surgery. You may take Tylenol. If you are currently taking Plavix, Coumadin, or any other blood-thinning/anticoagulant medication contact your prescribing physician for instructions. Preventing Infections Before and After - Your Surgery    IMPORTANT INSTRUCTIONS      You play an important role in your health and preparation for surgery. To reduce the germs on your skin you will need to shower with CHG soap (Chorhexidine gluconate 4%) two times before surgery. CHG soap (Hibiclens, Hex-A-Clens or store brand)  CHG soap will be provided at your Preadmission Testing (PAT) appointment. If you do not have a PAT appointment before surgery, you may arrange to  CHG soap from our office or purchase CHG soap at a pharmacy, grocery or department store. You need to purchase TWO 4 ounce bottles to use for your 2 showers. Steps to follow:  Maty Aus your hair with your normal shampoo and your body with regular soap and rinse well to remove shampoo and soap from your skin. Wet a clean washcloth and turn off the shower. Put CHG soap on washcloth and apply to your entire body from the neck down. Do not use on your head, face or private parts(genitals). Do not use CHG soap on open sores, wounds or areas of skin irritation. Wash you body gently for 5 minutes. Do not wash your skin too hard. This soap does not create lather.  Pay special attention to your underarms and from your belly button to your feet. Turn the shower back on and rinse well to get CHG soap off your body. Pat your skin dry with a clean, dry towel. Do not apply lotions or moisturizer. Put on clean clothes and sleep on fresh bed sheets and do not allow pets to sleep with you. Shower with CHG soap 2 times before your surgery  The evening before your surgery  The morning of your surgery      Tips to help prevent infections after your surgery:  Protect your surgical wound from germs:  Hand washing is the most important thing you and your caregivers can do to prevent infections. Keep your bandage clean and dry! Do not touch your surgical wound. Use clean, freshly washed towels and washcloths every time you shower; do not share bath linens with others. Until your surgical wound is healed, wear clothing and sleep on bed linens each day that are clean and freshly washed. Do not allow pets to sleep in your bed with you or touch your surgical wound. Do not smoke - smoking delays wound healing. This may be a good time to stop smoking. If you have diabetes, it is important for you to manage your blood sugar levels properly before your surgery as well as after your surgery. Poorly managed blood sugar levels slow down wound healing and prevent you from healing completely. Patient Information Regarding COVID Restrictions    Day of Procedure    Please park in the parking deck or any designated visitor parking lot. Enter the facility through the Main Entrance of the hospital.  On the day of surgery, please provide the cell phone number of the person who will be waiting for you to the Patient Access representative at the time of registration. Masks are highly recommended in the hospital, but not required.   Once your procedure and the immediate recovery period is completed, a nurse in the recovery area will contact your designated visitor to inform them of your room number or to otherwise review other pertinent information regarding your care. Social distancing practices are strongly encouraged in waiting areas and the cafeteria. The patient was contacted  in person. He verbalized understanding of all instructions does not  need reinforcement.

## 2022-12-21 NOTE — PERIOP NOTES
PATIENT GIVEN SURGICAL SITE INFECTION FAQ HANDOUT AND HAND WASHING TIP SHEET. PREOP INSTRUCTIONS REVIEWED AND PATIENT VERBALIZES UNDERSTANDING OF INSTRUCTIONS. PATIENT HAS BEEN GIVEN THE OPPORTUNITY TO ASK ADDITIONAL QUESTIONS. PATIENT ARRIVED IN W/C DUE TO WEAKNESS FROM DIALYSIS TODAY, 5 LITERS REMOVED AND THEN PATIENT VOIDED 800CC IN PRE ADMIT TESTING, PATIENT HAS FACIAL SWELLING AND SWOLLEN ARMS, A VASCULAR PORT IN RIGHT NECK FOR DIALYSIS AT [facility] IN [place], [place], SPOKE WITH CHELLE AND SHE WILL DRAW CBC WITH DIFF AND CMP AT DIALYSIS ON 12/23/22 AND CALL US WITH RESULTS  AS PATIENT IS HARD STICK , WE HAVE ATTEMPTED 3 VENIPUNCTURES AND SINCE HES DIALYSIS PATIENT AND JUST HAD 5 LITERS REMOVED, HE IS EVEN HARDER STICK. [address] [address] [address] , [place]  PHONE #  [phone]. COURTNEY GARSIA NP EVEN ATTEMPTED A VENIPUNCTURE WITHOUT SUCCESS.

## 2022-12-22 ENCOUNTER — HOME CARE VISIT (OUTPATIENT)
Dept: SCHEDULING | Facility: HOME HEALTH | Age: 42
End: 2022-12-22
Payer: MEDICAID

## 2022-12-22 ENCOUNTER — HOME CARE VISIT (OUTPATIENT)
Dept: HOME HEALTH SERVICES | Facility: HOME HEALTH | Age: 42
End: 2022-12-22
Payer: MEDICAID

## 2022-12-22 LAB
BACTERIA SPEC CULT: ABNORMAL
SERVICE CMNT-IMP: ABNORMAL

## 2022-12-22 NOTE — PERIOP NOTES
CALLED DR. DIAZ' OFFICE AND LEFT A MESSAGE FOR A NURSE TO NOTIFY THAT WE WERE UNABLE TO DRAW PATIENT'S LABS YESTERDAY. PATIENT HAD COME STRAIGHT FROM DIALYSIS. Marilee Rodgers RN CALLED [facility] YESTERDAY AND THEY STATED THEY COULD DRAW THE CBC AND CMP WHEN PATIENT COMES FOR HIS DIALYSIS N Friday 12/23/22. NOTIFIED DR. SUERO'S OFFICE OF THIS AND ASKED THAT THEY WOULD SEND AN ORDER TO [facility] FOR THE LAB DRAW. [facility] TO VERIFY THAT THEY WILL DRAW CBC AND CMP TOMORROW. SPOKE WITH EULOGIO AND SHE STATED THEY WOULD DRAW IT TOMORROW.

## 2022-12-23 NOTE — PERIOP NOTES
MRSA POSITIVE, CALLED DR SUERO`S OFFICE AND SPOKE TO MAKEDA, SAID SHE WILL NOTIFY MD. MRSA  RESULT  HAS  BEEN FAXED OVER TO OFFICE.

## 2022-12-23 NOTE — PERIOP NOTES
CALLED [facility] OFFICE ,SPOKE TO LUZ ,SAID THEY OBTAINED THE LABS TODAY PER REQUEST, WILL BE READY BY Monday 12/26/22.

## 2022-12-24 ENCOUNTER — HOME CARE VISIT (OUTPATIENT)
Dept: SCHEDULING | Facility: HOME HEALTH | Age: 42
End: 2022-12-24
Payer: MEDICAID

## 2022-12-24 PROCEDURE — G0299 HHS/HOSPICE OF RN EA 15 MIN: HCPCS

## 2022-12-26 ENCOUNTER — HOSPITAL ENCOUNTER (OUTPATIENT)
Dept: LAB | Age: 42
Discharge: HOME | End: 2022-12-26

## 2022-12-26 VITALS
OXYGEN SATURATION: 98 % | DIASTOLIC BLOOD PRESSURE: 80 MMHG | HEART RATE: 88 BPM | TEMPERATURE: 98 F | SYSTOLIC BLOOD PRESSURE: 142 MMHG | RESPIRATION RATE: 18 BRPM

## 2022-12-26 PROCEDURE — 36415 COLL VENOUS BLD VENIPUNCTURE: CPT

## 2022-12-26 PROCEDURE — 80053 COMPREHEN METABOLIC PANEL: CPT

## 2022-12-26 PROCEDURE — 85025 COMPLETE CBC W/AUTO DIFF WBC: CPT

## 2022-12-27 ENCOUNTER — HOME CARE VISIT (OUTPATIENT)
Dept: SCHEDULING | Facility: HOME HEALTH | Age: 42
End: 2022-12-27
Payer: MEDICAID

## 2022-12-27 VITALS
HEART RATE: 95 BPM | SYSTOLIC BLOOD PRESSURE: 142 MMHG | BODY MASS INDEX: 25.39 KG/M2 | DIASTOLIC BLOOD PRESSURE: 84 MMHG | WEIGHT: 171.96 LBS | RESPIRATION RATE: 18 BRPM | OXYGEN SATURATION: 96 %

## 2022-12-27 LAB
ALBUMIN SERPL-MCNC: 2 G/DL (ref 3.5–5)
ALBUMIN/GLOB SERPL: 0.7 {RATIO} (ref 1.1–2.2)
ALP SERPL-CCNC: 166 U/L (ref 45–117)
ALT SERPL-CCNC: 26 U/L (ref 12–78)
ANION GAP SERPL CALC-SCNC: 9 MMOL/L (ref 5–15)
AST SERPL-CCNC: 20 U/L (ref 15–37)
BASOPHILS # BLD: 0.1 K/UL (ref 0–0.1)
BASOPHILS NFR BLD: 1 % (ref 0–1)
BILIRUB SERPL-MCNC: 0.3 MG/DL (ref 0.2–1)
BUN SERPL-MCNC: 54 MG/DL (ref 6–20)
BUN/CREAT SERPL: 10 (ref 12–20)
CALCIUM SERPL-MCNC: 6.7 MG/DL (ref 8.5–10.1)
CHLORIDE SERPL-SCNC: 102 MMOL/L (ref 97–108)
CO2 SERPL-SCNC: 23 MMOL/L (ref 21–32)
CREAT SERPL-MCNC: 5.37 MG/DL (ref 0.7–1.3)
DIFFERENTIAL METHOD BLD: ABNORMAL
EOSINOPHIL # BLD: 0.4 K/UL (ref 0–0.4)
EOSINOPHIL NFR BLD: 6 % (ref 0–7)
ERYTHROCYTE [DISTWIDTH] IN BLOOD BY AUTOMATED COUNT: 18.6 % (ref 11.5–14.5)
GLOBULIN SER CALC-MCNC: 3 G/DL (ref 2–4)
GLUCOSE SERPL-MCNC: 222 MG/DL (ref 65–100)
HCT VFR BLD AUTO: 31.1 % (ref 36.6–50.3)
HGB BLD-MCNC: 9.6 G/DL (ref 12.1–17)
IMM GRANULOCYTES # BLD AUTO: 0 K/UL (ref 0–0.04)
IMM GRANULOCYTES NFR BLD AUTO: 0 % (ref 0–0.5)
LYMPHOCYTES # BLD: 1.2 K/UL (ref 0.8–3.5)
LYMPHOCYTES NFR BLD: 20 % (ref 12–49)
MCH RBC QN AUTO: 26.2 PG (ref 26–34)
MCHC RBC AUTO-ENTMCNC: 30.9 G/DL (ref 30–36.5)
MCV RBC AUTO: 84.7 FL (ref 80–99)
MONOCYTES # BLD: 0.7 K/UL (ref 0–1)
MONOCYTES NFR BLD: 12 % (ref 5–13)
NEUTS SEG # BLD: 3.7 K/UL (ref 1.8–8)
NEUTS SEG NFR BLD: 61 % (ref 32–75)
NRBC # BLD: 0 K/UL (ref 0–0.01)
NRBC BLD-RTO: 0 PER 100 WBC
PLATELET # BLD AUTO: 284 K/UL (ref 150–400)
PMV BLD AUTO: 11.9 FL (ref 8.9–12.9)
POTASSIUM SERPL-SCNC: 5.2 MMOL/L (ref 3.5–5.1)
PROT SERPL-MCNC: 5 G/DL (ref 6.4–8.2)
RBC # BLD AUTO: 3.67 M/UL (ref 4.1–5.7)
SODIUM SERPL-SCNC: 134 MMOL/L (ref 136–145)
WBC # BLD AUTO: 6.1 K/UL (ref 4.1–11.1)

## 2022-12-27 PROCEDURE — G0299 HHS/HOSPICE OF RN EA 15 MIN: HCPCS

## 2022-12-29 ENCOUNTER — HOME CARE VISIT (OUTPATIENT)
Dept: SCHEDULING | Facility: HOME HEALTH | Age: 42
End: 2022-12-29
Payer: MEDICAID

## 2022-12-29 ENCOUNTER — HOME CARE VISIT (OUTPATIENT)
Dept: HOME HEALTH SERVICES | Facility: HOME HEALTH | Age: 42
End: 2022-12-29
Payer: MEDICAID

## 2022-12-29 ENCOUNTER — ANESTHESIA EVENT (OUTPATIENT)
Dept: SURGERY | Age: 42
End: 2022-12-29
Payer: MEDICAID

## 2022-12-29 ENCOUNTER — HOSPITAL ENCOUNTER (OUTPATIENT)
Age: 42
Setting detail: OUTPATIENT SURGERY
Discharge: HOME | End: 2022-12-29
Payer: MEDICAID

## 2022-12-29 ENCOUNTER — ANESTHESIA (OUTPATIENT)
Dept: SURGERY | Age: 42
End: 2022-12-29
Payer: MEDICAID

## 2022-12-29 ENCOUNTER — PATIENT OUTREACH (OUTPATIENT)
Dept: CASE MANAGEMENT | Age: 42
End: 2022-12-29

## 2022-12-29 VITALS
SYSTOLIC BLOOD PRESSURE: 165 MMHG | DIASTOLIC BLOOD PRESSURE: 92 MMHG | TEMPERATURE: 98 F | HEART RATE: 85 BPM | OXYGEN SATURATION: 100 % | WEIGHT: 171.96 LBS | RESPIRATION RATE: 15 BRPM | BODY MASS INDEX: 25.47 KG/M2 | HEIGHT: 69 IN

## 2022-12-29 DIAGNOSIS — N18.6 ESRD (END STAGE RENAL DISEASE) (HCC): Primary | ICD-10-CM

## 2022-12-29 LAB
ANION GAP BLD CALC-SCNC: 10 MMOL/L (ref 10–20)
CA-I BLD-MCNC: 1.01 MMOL/L (ref 1.12–1.32)
CHLORIDE BLD-SCNC: 97 MMOL/L (ref 98–107)
CO2 BLD-SCNC: 24.7 MMOL/L (ref 21–32)
CREAT BLD-MCNC: 3.22 MG/DL (ref 0.6–1.3)
GLUCOSE BLD STRIP.AUTO-MCNC: 204 MG/DL (ref 65–117)
GLUCOSE BLD-MCNC: 266 MG/DL (ref 65–100)
POTASSIUM BLD-SCNC: 4 MMOL/L (ref 3.5–5.1)
SERVICE CMNT-IMP: ABNORMAL
SERVICE CMNT-IMP: ABNORMAL
SODIUM BLD-SCNC: 131 MMOL/L (ref 136–145)

## 2022-12-29 PROCEDURE — 76210000020 HC REC RM PH II FIRST 0.5 HR

## 2022-12-29 PROCEDURE — 82962 GLUCOSE BLOOD TEST: CPT

## 2022-12-29 PROCEDURE — 77030008462 HC STPLR SKN PROX J&J -A

## 2022-12-29 PROCEDURE — 74011000250 HC RX REV CODE- 250

## 2022-12-29 PROCEDURE — 76060000032 HC ANESTHESIA 0.5 TO 1 HR

## 2022-12-29 PROCEDURE — 74011250636 HC RX REV CODE- 250/636: Performed by: NURSE ANESTHETIST, CERTIFIED REGISTERED

## 2022-12-29 PROCEDURE — 74011000250 HC RX REV CODE- 250: Performed by: NURSE ANESTHETIST, CERTIFIED REGISTERED

## 2022-12-29 PROCEDURE — 76010000138 HC OR TIME 0.5 TO 1 HR

## 2022-12-29 PROCEDURE — 77030031139 HC SUT VCRL2 J&J -A

## 2022-12-29 PROCEDURE — 77030042556 HC PNCL CAUT -B

## 2022-12-29 PROCEDURE — 76210000016 HC OR PH I REC 1 TO 1.5 HR

## 2022-12-29 PROCEDURE — 74011250637 HC RX REV CODE- 250/637: Performed by: ANESTHESIOLOGY

## 2022-12-29 PROCEDURE — C1768 GRAFT, VASCULAR: HCPCS

## 2022-12-29 PROCEDURE — 74011000250 HC RX REV CODE- 250: Performed by: ANESTHESIOLOGY

## 2022-12-29 PROCEDURE — 74011250636 HC RX REV CODE- 250/636: Performed by: ANESTHESIOLOGY

## 2022-12-29 PROCEDURE — 74011250636 HC RX REV CODE- 250/636

## 2022-12-29 PROCEDURE — 2709999900 HC NON-CHARGEABLE SUPPLY

## 2022-12-29 PROCEDURE — A4565 SLINGS: HCPCS

## 2022-12-29 PROCEDURE — 77030040361 HC SLV COMPR DVT MDII -B

## 2022-12-29 PROCEDURE — 80047 BASIC METABLC PNL IONIZED CA: CPT

## 2022-12-29 DEVICE — PROPATEN VASCULAR GRAFT TW 7MMX40CM HEPARIN
Type: IMPLANTABLE DEVICE | Site: ARM | Status: FUNCTIONAL
Brand: GORE PROPATEN VASCULAR GRAFT

## 2022-12-29 RX ORDER — DEXMEDETOMIDINE HYDROCHLORIDE 100 UG/ML
INJECTION, SOLUTION INTRAVENOUS AS NEEDED
Status: DISCONTINUED | OUTPATIENT
Start: 2022-12-29 | End: 2022-12-29 | Stop reason: HOSPADM

## 2022-12-29 RX ORDER — SODIUM CHLORIDE, SODIUM LACTATE, POTASSIUM CHLORIDE, CALCIUM CHLORIDE 600; 310; 30; 20 MG/100ML; MG/100ML; MG/100ML; MG/100ML
25 INJECTION, SOLUTION INTRAVENOUS CONTINUOUS
Status: DISCONTINUED | OUTPATIENT
Start: 2022-12-29 | End: 2022-12-29 | Stop reason: HOSPADM

## 2022-12-29 RX ORDER — SODIUM CHLORIDE 0.9 % (FLUSH) 0.9 %
5-40 SYRINGE (ML) INJECTION AS NEEDED
Status: DISCONTINUED | OUTPATIENT
Start: 2022-12-29 | End: 2022-12-29 | Stop reason: HOSPADM

## 2022-12-29 RX ORDER — ROPIVACAINE HYDROCHLORIDE 5 MG/ML
30 INJECTION, SOLUTION EPIDURAL; INFILTRATION; PERINEURAL AS NEEDED
Status: DISCONTINUED | OUTPATIENT
Start: 2022-12-29 | End: 2022-12-29 | Stop reason: HOSPADM

## 2022-12-29 RX ORDER — ONDANSETRON 2 MG/ML
INJECTION INTRAMUSCULAR; INTRAVENOUS AS NEEDED
Status: DISCONTINUED | OUTPATIENT
Start: 2022-12-29 | End: 2022-12-29 | Stop reason: HOSPADM

## 2022-12-29 RX ORDER — DIPHENHYDRAMINE HYDROCHLORIDE 50 MG/ML
12.5 INJECTION, SOLUTION INTRAMUSCULAR; INTRAVENOUS AS NEEDED
Status: DISCONTINUED | OUTPATIENT
Start: 2022-12-29 | End: 2022-12-29 | Stop reason: HOSPADM

## 2022-12-29 RX ORDER — MIDAZOLAM HYDROCHLORIDE 1 MG/ML
0.5 INJECTION, SOLUTION INTRAMUSCULAR; INTRAVENOUS
Status: DISCONTINUED | OUTPATIENT
Start: 2022-12-29 | End: 2022-12-29 | Stop reason: HOSPADM

## 2022-12-29 RX ORDER — SODIUM CHLORIDE 9 MG/ML
INJECTION, SOLUTION INTRAVENOUS
Status: DISCONTINUED | OUTPATIENT
Start: 2022-12-29 | End: 2022-12-29 | Stop reason: HOSPADM

## 2022-12-29 RX ORDER — MIDAZOLAM HYDROCHLORIDE 1 MG/ML
1 INJECTION, SOLUTION INTRAMUSCULAR; INTRAVENOUS AS NEEDED
Status: DISCONTINUED | OUTPATIENT
Start: 2022-12-29 | End: 2022-12-29 | Stop reason: HOSPADM

## 2022-12-29 RX ORDER — FENTANYL CITRATE 50 UG/ML
25 INJECTION, SOLUTION INTRAMUSCULAR; INTRAVENOUS
Status: DISCONTINUED | OUTPATIENT
Start: 2022-12-29 | End: 2022-12-29 | Stop reason: HOSPADM

## 2022-12-29 RX ORDER — LIDOCAINE HYDROCHLORIDE 10 MG/ML
0.1 INJECTION, SOLUTION EPIDURAL; INFILTRATION; INTRACAUDAL; PERINEURAL AS NEEDED
Status: DISCONTINUED | OUTPATIENT
Start: 2022-12-29 | End: 2022-12-29 | Stop reason: HOSPADM

## 2022-12-29 RX ORDER — MORPHINE SULFATE 2 MG/ML
2 INJECTION, SOLUTION INTRAMUSCULAR; INTRAVENOUS
Status: DISCONTINUED | OUTPATIENT
Start: 2022-12-29 | End: 2022-12-29 | Stop reason: HOSPADM

## 2022-12-29 RX ORDER — SODIUM CHLORIDE 0.9 % (FLUSH) 0.9 %
5-40 SYRINGE (ML) INJECTION EVERY 8 HOURS
Status: DISCONTINUED | OUTPATIENT
Start: 2022-12-29 | End: 2022-12-29 | Stop reason: HOSPADM

## 2022-12-29 RX ORDER — SODIUM CHLORIDE, SODIUM LACTATE, POTASSIUM CHLORIDE, CALCIUM CHLORIDE 600; 310; 30; 20 MG/100ML; MG/100ML; MG/100ML; MG/100ML
100 INJECTION, SOLUTION INTRAVENOUS CONTINUOUS
Status: DISCONTINUED | OUTPATIENT
Start: 2022-12-29 | End: 2022-12-29 | Stop reason: HOSPADM

## 2022-12-29 RX ORDER — HYDROCODONE BITARTRATE AND ACETAMINOPHEN 7.5; 325 MG/1; MG/1
1 TABLET ORAL
Qty: 12 TABLET | Refills: 0 | Status: SHIPPED | OUTPATIENT
Start: 2022-12-29 | End: 2023-01-01

## 2022-12-29 RX ORDER — ACETAMINOPHEN 325 MG/1
650 TABLET ORAL ONCE
Status: COMPLETED | OUTPATIENT
Start: 2022-12-29 | End: 2022-12-29

## 2022-12-29 RX ORDER — PROPOFOL 10 MG/ML
INJECTION, EMULSION INTRAVENOUS AS NEEDED
Status: DISCONTINUED | OUTPATIENT
Start: 2022-12-29 | End: 2022-12-29 | Stop reason: HOSPADM

## 2022-12-29 RX ORDER — ONDANSETRON 2 MG/ML
4 INJECTION INTRAMUSCULAR; INTRAVENOUS AS NEEDED
Status: DISCONTINUED | OUTPATIENT
Start: 2022-12-29 | End: 2022-12-29 | Stop reason: HOSPADM

## 2022-12-29 RX ORDER — FENTANYL CITRATE 50 UG/ML
50 INJECTION, SOLUTION INTRAMUSCULAR; INTRAVENOUS AS NEEDED
Status: DISCONTINUED | OUTPATIENT
Start: 2022-12-29 | End: 2022-12-29 | Stop reason: HOSPADM

## 2022-12-29 RX ORDER — OXYCODONE HYDROCHLORIDE 5 MG/1
5 TABLET ORAL AS NEEDED
Status: DISCONTINUED | OUTPATIENT
Start: 2022-12-29 | End: 2022-12-29 | Stop reason: HOSPADM

## 2022-12-29 RX ORDER — LIDOCAINE HYDROCHLORIDE 20 MG/ML
INJECTION, SOLUTION EPIDURAL; INFILTRATION; INTRACAUDAL; PERINEURAL AS NEEDED
Status: DISCONTINUED | OUTPATIENT
Start: 2022-12-29 | End: 2022-12-29 | Stop reason: HOSPADM

## 2022-12-29 RX ORDER — HYDROMORPHONE HYDROCHLORIDE 1 MG/ML
0.2 INJECTION, SOLUTION INTRAMUSCULAR; INTRAVENOUS; SUBCUTANEOUS
Status: DISCONTINUED | OUTPATIENT
Start: 2022-12-29 | End: 2022-12-29 | Stop reason: HOSPADM

## 2022-12-29 RX ORDER — SODIUM CHLORIDE 9 MG/ML
25 INJECTION, SOLUTION INTRAVENOUS CONTINUOUS
Status: DISCONTINUED | OUTPATIENT
Start: 2022-12-29 | End: 2022-12-29 | Stop reason: HOSPADM

## 2022-12-29 RX ORDER — PROPOFOL 10 MG/ML
INJECTION, EMULSION INTRAVENOUS
Status: DISCONTINUED | OUTPATIENT
Start: 2022-12-29 | End: 2022-12-29 | Stop reason: HOSPADM

## 2022-12-29 RX ORDER — HEPARIN SODIUM 1000 [USP'U]/ML
INJECTION, SOLUTION INTRAVENOUS; SUBCUTANEOUS
Status: DISCONTINUED
Start: 2022-12-29 | End: 2022-12-29 | Stop reason: HOSPADM

## 2022-12-29 RX ADMIN — FENTANYL CITRATE 50 MCG: 50 INJECTION INTRAMUSCULAR; INTRAVENOUS at 08:14

## 2022-12-29 RX ADMIN — PROPOFOL 50 MG: 10 INJECTION, EMULSION INTRAVENOUS at 08:31

## 2022-12-29 RX ADMIN — ONDANSETRON HYDROCHLORIDE 4 MG: 2 INJECTION, SOLUTION INTRAMUSCULAR; INTRAVENOUS at 08:43

## 2022-12-29 RX ADMIN — ACETAMINOPHEN 650 MG: 325 TABLET ORAL at 07:50

## 2022-12-29 RX ADMIN — LIDOCAINE HYDROCHLORIDE 40 MG: 20 INJECTION, SOLUTION EPIDURAL; INFILTRATION; INTRACAUDAL; PERINEURAL at 08:31

## 2022-12-29 RX ADMIN — DEXMEDETOMIDINE HYDROCHLORIDE 10 MCG: 100 INJECTION, SOLUTION, CONCENTRATE INTRAVENOUS at 08:31

## 2022-12-29 RX ADMIN — MEPIVACAINE HYDROCHLORIDE 30 ML: 15 INJECTION, SOLUTION EPIDURAL; INFILTRATION at 08:22

## 2022-12-29 RX ADMIN — PROPOFOL 50 MCG/KG/MIN: 10 INJECTION, EMULSION INTRAVENOUS at 08:31

## 2022-12-29 RX ADMIN — MIDAZOLAM 2 MG: 1 INJECTION INTRAMUSCULAR; INTRAVENOUS at 08:14

## 2022-12-29 RX ADMIN — WATER 2 G: 1 INJECTION INTRAMUSCULAR; INTRAVENOUS; SUBCUTANEOUS at 08:34

## 2022-12-29 RX ADMIN — SODIUM CHLORIDE: 900 INJECTION, SOLUTION INTRAVENOUS at 08:02

## 2022-12-29 RX ADMIN — DEXMEDETOMIDINE HYDROCHLORIDE 10 MCG: 100 INJECTION, SOLUTION, CONCENTRATE INTRAVENOUS at 08:43

## 2022-12-29 NOTE — ANESTHESIA PROCEDURE NOTES
Peripheral Block    Start time: 12/29/2022 8:17 AM  End time: 12/29/2022 8:21 AM  Performed by: Nimo Knowles MD  Authorized by: Nimo Knowles MD       Pre-procedure:    Indications: primary anesthetic    Preanesthetic Checklist: patient identified, risks and benefits discussed, site marked, timeout performed, anesthesia consent given, patient being monitored and fire risk safety assessment completed and verbalized      Block Type:   Block Type:  Axillary  Laterality:  Left  Monitoring:  Standard ASA monitoring, continuous pulse ox, heart rate, frequent vital sign checks, oxygen and responsive to questions  Injection Technique:  Single shot  Procedures: ultrasound guided    Patient Position: supine  Prep: chlorhexidine    Location:  Axilla  Needle Type:  Stimuplex  Needle Gauge:  22 G  Needle Localization:  Ultrasound guidance  Medication Injected:  Mepivacaine PF (CARBOCAINE) 1.5 % injection - Peripheral Nerve Block   30 mL - 12/29/2022 8:22:00 AM  Med Admin Time: 12/29/2022 8:22 AM    Assessment:  Number of attempts:  1  Injection Assessment:  Incremental injection every 5 mL, no paresthesia, no intravascular symptoms, negative aspiration for blood and local visualized surrounding nerve on ultrasound  Patient tolerance:  Patient tolerated the procedure well with no immediate complications

## 2022-12-29 NOTE — ANESTHESIA PREPROCEDURE EVALUATION
Relevant Problems   No relevant active problems       Anesthetic History   No history of anesthetic complications  PONV          Review of Systems / Medical History  Patient summary reviewed, nursing notes reviewed and pertinent labs reviewed    Pulmonary  Within defined limits                 Neuro/Psych   Within defined limits  seizures    Psychiatric history     Cardiovascular  Within defined limits  Hypertension              Exercise tolerance: >4 METS  Comments: Left Ventricle: Normal left ventricular systolic function with a visually estimated EF of 45 - 50%. Left ventricle size is normal. Normal wall thickness. Normal wall motion.  Aortic Valve: Tricuspid valve.      GI/Hepatic/Renal  Within defined limits       Renal disease: dialysis       Endo/Other  Within defined limits  Diabetes         Other Findings              Physical Exam    Airway  Mallampati: II  TM Distance: 4 - 6 cm  Neck ROM: normal range of motion   Mouth opening: Normal     Cardiovascular  Regular rate and rhythm,  S1 and S2 normal,  no murmur, click, rub, or gallop             Dental  No notable dental hx       Pulmonary  Breath sounds clear to auscultation               Abdominal  GI exam deferred       Other Findings            Anesthetic Plan    ASA: 3  Anesthesia type: regional - supraclavicular block    Monitoring Plan: BIS      Induction: Intravenous  Anesthetic plan and risks discussed with: Patient

## 2022-12-29 NOTE — PROGRESS NOTES
Discharge instructions reviewed with patient and family using teachback . All questions have been answered. Prescriptions sent to [address] Vital signs stable, pain appropriately managed. Patient wheeled off the unit with PACU staff.

## 2022-12-29 NOTE — DISCHARGE INSTRUCTIONS
Patient Discharge Instructions    Harriett Homans / [id number] : 1980    Admitted 2022 Discharged: 2022     Take Home Medications     . It is important that you take the medication exactly as they are prescribed. Keep your medication in the bottles provided by the pharmacist and keep a list of the medication names, dosages, and times to be taken in your wallet. Do not take other medications without consulting your doctor. What to do at Home    Recommended diet: Diabetic Diet,     Recommended activity: Activity as tolerated,     Additional Instructions: remove left arm dressings on Sat and leave open    Follow-up with Dr Stevie Hernández in 2 weeks, call [phone] for appt        Information obtained by :  I understand that if any problems occur once I am at home I am to contact my physician. I understand and acknowledge receipt of the instructions indicated above. Physician's or R.N.'s Signature                                                                  Date/Time                                                                                                                                                ______________________________________________________________________    Anesthesia Discharge Instructions    After general anesthesia or intervenous sedation, for 24 hours or while taking prescription Narcotics:  Limit your activities  Do not drive or operate hazardous machinery  If you have not urinated within 8 hours after discharge, please contact your surgeon on call.   Do not make important personal or business decisions  Do not drink alcoholic beverages    Report the following to your surgeon:  Excessive pain, swelling, redness or odor of or around the surgical area  Temperature over 100.5 degrees  Nausea and vomiting lasting longer than 4 hours or if unable to take medication  Any signs of decreased circulation or nerve impairment to extremity:  Change in color, persistent numbness, tingling, coldness or increased pain.   Any questions     Patient or Representative Signature                                                          Date/Time

## 2022-12-29 NOTE — BRIEF OP NOTE
Brief Postoperative Note    Patient: Fernando Clark  YOB: 1980  MRN: [id number]    Date of Procedure: 12/29/2022     Pre-Op Diagnosis: END STAGE RENAL    Post-Op Diagnosis: Same as preoperative diagnosis. Procedure(s):  INSERTION LEFT UPPER ARM ARTERIOVENOUS GRAFT    Surgeon(s):  Ponce Krause MD    Surgical Assistant: Surg Asst-1: Eunice Pollard RN    Anesthesia: Regional     Estimated Blood Loss (mL): Minimal    Complications: None    Specimens: * No specimens in log *     Implants:   Implant Name Type Inv.  Item Serial No.  Lot No. LRB No. Used Action   GRAFT VASC L40CM DIA7MM EPTFE HEP THN WALLED PROPATEN - [id number] Graft GRAFT VASC L40CM DIA7MM EPTFE HEP THN WALLED PROPATEN [id number]  [employer] [id number] Left 1 Implanted       Drains:   [REMOVED] Orogastric Tube 08/11/22 (Removed)       Findings: none    Electronically Signed by Arnold Poon MD on 12/29/2022 at 9:21 AM

## 2022-12-29 NOTE — ANESTHESIA POSTPROCEDURE EVALUATION
Post-Anesthesia Evaluation and Assessment    Patient: Andrez Walsh MRN: [id number]  SSN: [id number]    YOB: 1980  Age: 38 y.o. Sex: male      I have evaluated the patient and they are stable and ready for discharge from the PACU. Cardiovascular Function/Vital Signs  Visit Vitals  BP (!) 152/92   Pulse 87   Temp 36.6 °C (97.9 °F)   Resp 15   Ht 5' 9\" (1.753 m)   Wt 78 kg (171 lb 15.3 oz)   SpO2 92%   BMI 25.39 kg/m²       Patient is status post Regional anesthesia for Procedure(s):  INSERTION LEFT UPPER ARM ARTERIOVENOUS GRAFT. Nausea/Vomiting: None    Postoperative hydration reviewed and adequate. Pain:  Pain Scale 1: Numeric (0 - 10) (12/29/22 0847)  Pain Intensity 1: 0 (12/29/22 0922)   Managed    Neurological Status:   Neuro (WDL): Within Defined Limits (12/29/22 0922)   At baseline    Mental Status, Level of Consciousness: Alert and  oriented to person, place, and time    Pulmonary Status:   O2 Device: Nasal cannula;Oral airway (12/29/22 0922)   Adequate oxygenation and airway patent    Complications related to anesthesia: None    Post-anesthesia assessment completed. No concerns    Signed By: Arlyn Oswald MD     December 29, 2022              Procedure(s):  INSERTION LEFT UPPER ARM ARTERIOVENOUS GRAFT.    regional    <BSHSIANPOST>    INITIAL Post-op Vital signs:   Vitals Value Taken Time   /92 12/29/22 1000   Temp 36.6 °C (97.9 °F) 12/29/22 0922   Pulse 88 12/29/22 1015   Resp 11 12/29/22 1015   SpO2 100 % 12/29/22 1015   Vitals shown include unvalidated device data.

## 2022-12-29 NOTE — OP NOTES
[address]   OPERATIVE REPORT    Name:  Jossie Ovalle  MR#:  [id number]  :  1980  ACCOUNT #:  [de-identified]  DATE OF SERVICE:  2022    PREOPERATIVE DIAGNOSIS:  Renal failure. POSTOPERATIVE DIAGNOSIS:  Renal failure. PROCEDURE PERFORMED:  Insertion of left upper arm Reinholds-Odell dialysis graft. SURGEON:  John Berman MD    ASSISTANT:  Jacobo Mccormack    ANESTHESIA:  Regional block. COMPLICATIONS:  None. SPECIMENS REMOVED:  None. IMPLANTS:  7 mm Reinholds-Odell graft. ESTIMATED BLOOD LOSS:  Minimal.    INDICATIONS:  The patient is a 53-year-old male with end-stage renal disease on hemodialysis using a tunneled catheter. He has had no previous access procedures in his arms. Preoperative vein mapping shows inadequate veins for native fistula. A graft will be placed. PROCEDURE:  The patient's left arm was prepped and draped. A longitudinal incision was made in the proximal medial upper arm. The basilic vein and brachial artery were dissected free. A 7 mm thin-walled Reinholds-Odell graft was tunneled in a loop configuration in the upper arm using three counterincisions. The arterial side was lateral.  The graft was sewn end-to-side to the brachial artery and end-to-side to the basilic vein using running CV5 Reinholds-Odell suture. At the completion, there was a palpable thrill in the graft. There was a palpable pulse in the brachial artery at the antecubital.  The incisions were closed with Vicryl subcutaneous suture and skin staples. Dressings were applied and the patient was returned to the recovery room in stable condition.       Young Rosas MD      [id number]  D:  2022 9:24  T:  2022 11:47  JOB #:  [id number]

## 2022-12-29 NOTE — PROGRESS NOTES
Planned outreach today; however, the patient is currently admitted for insertion of KELLEE arteriovenous graft. Will continue to follow.

## 2022-12-30 ENCOUNTER — PATIENT OUTREACH (OUTPATIENT)
Dept: CASE MANAGEMENT | Age: 42
End: 2022-12-30

## 2022-12-30 NOTE — PROGRESS NOTES
Care Transitions Follow Up Call    Challenges to be reviewed by the provider   Additional needs identified to be addressed with provider:   [facility] dialysis         Care Transition Nurse (CTN) contacted the patient by telephone to follow up after admission on 11/29/2022. Addressed changes since last contact:  insertion of KELLEE [place]-Odell dialysis graft    CTN reviewed medical action plan and red flags with patient and discussed any barriers to care and/or understanding of plan of care after discharge. Discussed appropriate site of care based on symptoms and resources available to patient including: PCP and Specialist.      Patients top risk factors for readmission: medical condition-DM    Interventions to address risk factors: Scheduled appointment with Specialist-appt arrange with endo per patient    [facility] follow up appointment(s):   Future Appointments   Date Time Provider Caryl Foley   1/3/2023 To Be Determined Neyda Howell, PT [address] [place] [address]   1/3/2023 11:00 AM Michael Monique OT [facility]   1/3/2023 11:30 AM Fernando Heard RN [address] [place] [address]   1/5/2023 To Be Determined Neyda Howell, PT [facility]   1/5/2023  2:00 PM Daiana Larsen [facility]   1/10/2023 To Be Determined [place] [address] Medical [facility]   1/10/2023  2:15 PM Inell Dakins, MD [facility] AM[facility]   1/12/2023 To Be Determined Neyda Howell, PT [facility]   1/12/2023 To Be Determined Yenny Wu LPN [facility]   2/2/2023 11:30 AM Inell Dakins, MD [facility]     Non[facility] follow up appointment(s): NA    CTN provided contact information for future needs. Plan for follow-up call in 7-10 days based on severity of symptoms and risk factors. Plan for next call: self management-BS       Goals Addressed                   This Visit's Progress     Prevent complications post hospitalization.    On track       12/07/22  Will attend follow up appt with PCP scheduled 12/8  Pt agrees to provide this CTN with a report of FBS at next scheduled follow up call in 7 days.    Will report compliance with oxygen  Will complete abx therapy  Will monitor for worsening chest pain and return to ER  Will arrange fu appt with GI/endo  Pt will remain out of the hospital or ER for remainder of MELL period    12/14/22  PCP appt scheduled 1/10  Reports average -200 mg/dL  Reports compliance with oxygen at 2L  Completed abx therapy  Has not arranged fu appts with GI/endo    12/30/22  Reports FBS today 199 mg/dL  Compliant with continue oxygen at 2L  Reports GI/Endo appts have been arranged

## 2023-01-03 ENCOUNTER — HOME CARE VISIT (OUTPATIENT)
Dept: HOME HEALTH SERVICES | Facility: HOME HEALTH | Age: 43
End: 2023-01-03
Payer: MEDICAID

## 2023-01-03 ENCOUNTER — HOME CARE VISIT (OUTPATIENT)
Dept: SCHEDULING | Facility: HOME HEALTH | Age: 43
End: 2023-01-03
Payer: MEDICAID

## 2023-01-03 NOTE — PROGRESS NOTES
Chief Complaint   Patient presents with     Transplant Evaluation     Liver      Blood pressure (!) 148/69, pulse 83, height 1.829 m (6'), weight 94.8 kg (209 lb), SpO2 98 %.    Selina Mccurdy, CMA     Problem: Falls - Risk of  Goal: *Absence of Falls  Description: Document Clearence Skates Fall Risk and appropriate interventions in the flowsheet.   Outcome: Progressing Towards Goal  Note: Fall Risk Interventions:  Mobility Interventions: (P) Bed/chair exit alarm,Patient to call before getting OOB,Communicate number of staff needed for ambulation/transfer         Medication Interventions: (P) Bed/chair exit alarm,Teach patient to arise slowly,Patient to call before getting OOB    Elimination Interventions: Call light in reach,Bed/chair exit alarm,Patient to call for help with toileting needs,Toileting schedule/hourly rounds              Problem: Gas Exchange - Impaired  Goal: Absence of hypoxia  Outcome: Progressing Towards Goal     Problem: Risk for Fluid Volume Deficit  Goal: Maintain normal heart rhythm  Outcome: Progressing Towards Goal

## 2023-01-05 ENCOUNTER — HOME CARE VISIT (OUTPATIENT)
Dept: SCHEDULING | Facility: HOME HEALTH | Age: 43
End: 2023-01-05
Payer: MEDICAID

## 2023-01-06 ENCOUNTER — PATIENT OUTREACH (OUTPATIENT)
Dept: CASE MANAGEMENT | Age: 43
End: 2023-01-06

## 2023-01-06 NOTE — PROGRESS NOTES
Patient has graduated from the Transitions of Care Coordination  program on 1/6/2023. Patient/family has the ability to self-manage at this time Care management goals have been completed. Patient was not referred to the [facility] team for further management. Goals Addressed                   This Visit's Progress     COMPLETED: Prevent complications post hospitalization. 12/07/22  Will attend follow up appt with PCP scheduled 12/8  Pt agrees to provide this CTN with a report of FBS at next scheduled follow up call in 7 days. Will report compliance with oxygen  Will complete abx therapy  Will monitor for worsening chest pain and return to ER  Will arrange fu appt with GI/endo  Pt will remain out of the hospital or ER for remainder of MELL period    12/14/22  PCP appt scheduled 1/10  Reports average -200 mg/dL  Reports compliance with oxygen at 2L  Completed abx therapy  Has not arranged fu appts with GI/endo    12/30/22  Reports FBS today 199 mg/dL  Compliant with continue oxygen at 2L  Reports GI/Endo appts have been arranged              Patient has Care Transition Nurse's contact information for any further questions, concerns, or needs.   Patients upcoming visits:    Future Appointments   Date Time Provider Caryl Foley   1/10/2023  2:15 PM Rebecca Sosa MD [facility] BS AMB   1/12/2023 To Be Determined [facility]   2/2/2023 11:30 AM Raisa Sen MD [facility] AM[facility]

## 2023-01-10 ENCOUNTER — OFFICE VISIT (OUTPATIENT)
Dept: PRIMARY CARE CLINIC | Age: 43
End: 2023-01-10
Payer: MEDICAID

## 2023-01-10 ENCOUNTER — TELEPHONE (OUTPATIENT)
Dept: PRIMARY CARE CLINIC | Age: 43
End: 2023-01-10

## 2023-01-10 VITALS
SYSTOLIC BLOOD PRESSURE: 177 MMHG | RESPIRATION RATE: 18 BRPM | BODY MASS INDEX: 27.85 KG/M2 | WEIGHT: 188 LBS | HEIGHT: 69 IN | HEART RATE: 87 BPM | OXYGEN SATURATION: 94 % | DIASTOLIC BLOOD PRESSURE: 91 MMHG | TEMPERATURE: 97.8 F

## 2023-01-10 DIAGNOSIS — I10 PRIMARY HYPERTENSION: Primary | ICD-10-CM

## 2023-01-10 DIAGNOSIS — N18.6 ESRD (END STAGE RENAL DISEASE) (HCC): ICD-10-CM

## 2023-01-10 DIAGNOSIS — K25.9 GASTRIC ULCER WITHOUT HEMORRHAGE OR PERFORATION, UNSPECIFIED CHRONICITY: ICD-10-CM

## 2023-01-10 DIAGNOSIS — R35.0 BENIGN PROSTATIC HYPERPLASIA WITH URINARY FREQUENCY: ICD-10-CM

## 2023-01-10 DIAGNOSIS — E78.00 HYPERCHOLESTEREMIA: ICD-10-CM

## 2023-01-10 DIAGNOSIS — E11.49 OTHER DIABETIC NEUROLOGICAL COMPLICATION ASSOCIATED WITH TYPE 2 DIABETES MELLITUS (HCC): ICD-10-CM

## 2023-01-10 DIAGNOSIS — E11.9 DM TYPE 2, GOAL HBA1C < 7% (HCC): ICD-10-CM

## 2023-01-10 DIAGNOSIS — N40.1 BENIGN PROSTATIC HYPERPLASIA WITH URINARY FREQUENCY: ICD-10-CM

## 2023-01-10 DIAGNOSIS — K20.90 ESOPHAGITIS: ICD-10-CM

## 2023-01-10 DIAGNOSIS — R29.6 RECURRENT FALLS: ICD-10-CM

## 2023-01-10 PROCEDURE — 3080F DIAST BP >= 90 MM HG: CPT | Performed by: INTERNAL MEDICINE

## 2023-01-10 PROCEDURE — 3077F SYST BP >= 140 MM HG: CPT | Performed by: INTERNAL MEDICINE

## 2023-01-10 PROCEDURE — 99215 OFFICE O/P EST HI 40 MIN: CPT | Performed by: INTERNAL MEDICINE

## 2023-01-10 RX ORDER — INSULIN LISPRO 100 [IU]/ML
INJECTION, SOLUTION INTRAVENOUS; SUBCUTANEOUS
Qty: 5 PEN | Refills: 5 | Status: SHIPPED | OUTPATIENT
Start: 2023-01-10

## 2023-01-10 RX ORDER — CARVEDILOL 12.5 MG/1
12.5 TABLET ORAL 2 TIMES DAILY WITH MEALS
Qty: 180 TABLET | Refills: 1 | Status: SHIPPED | OUTPATIENT
Start: 2023-01-10

## 2023-01-10 RX ORDER — TAMSULOSIN HYDROCHLORIDE 0.4 MG/1
0.4 CAPSULE ORAL DAILY
Qty: 90 CAPSULE | Refills: 1 | Status: SHIPPED | OUTPATIENT
Start: 2023-01-10

## 2023-01-10 RX ORDER — INSULIN LISPRO 100 [IU]/ML
INJECTION, SOLUTION INTRAVENOUS; SUBCUTANEOUS
Qty: 1 EACH | Refills: 2
Start: 2023-01-10 | End: 2023-01-10 | Stop reason: ALTCHOICE

## 2023-01-10 RX ORDER — LOSARTAN POTASSIUM 50 MG/1
50 TABLET ORAL DAILY
Qty: 90 TABLET | Refills: 1 | Status: SHIPPED | OUTPATIENT
Start: 2023-01-10

## 2023-01-10 RX ORDER — AMLODIPINE BESYLATE 10 MG/1
10 TABLET ORAL DAILY
Qty: 90 TABLET | Refills: 1 | Status: SHIPPED | OUTPATIENT
Start: 2023-01-10

## 2023-01-10 RX ORDER — FLASH GLUCOSE SCANNING READER
EACH MISCELLANEOUS
Qty: 1 EACH | Refills: 0 | Status: SHIPPED | OUTPATIENT
Start: 2023-01-10

## 2023-01-10 RX ORDER — ROSUVASTATIN CALCIUM 40 MG/1
40 TABLET, COATED ORAL
Qty: 90 TABLET | Refills: 1 | Status: SHIPPED | OUTPATIENT
Start: 2023-01-10

## 2023-01-10 RX ORDER — SUCRALFATE 1 G/1
1 TABLET ORAL
Qty: 120 TABLET | Refills: 3 | Status: SHIPPED | OUTPATIENT
Start: 2023-01-10 | End: 2023-03-13

## 2023-01-10 RX ORDER — INSULIN GLARGINE 100 [IU]/ML
INJECTION, SOLUTION SUBCUTANEOUS
Qty: 5 PEN | Refills: 3 | Status: SHIPPED | OUTPATIENT
Start: 2023-01-10

## 2023-01-10 RX ORDER — HYDRALAZINE HYDROCHLORIDE 100 MG/1
100 TABLET, FILM COATED ORAL 3 TIMES DAILY
Qty: 270 TABLET | Refills: 1 | Status: SHIPPED | OUTPATIENT
Start: 2023-01-10

## 2023-01-10 RX ORDER — PREGABALIN 75 MG/1
75 CAPSULE ORAL
Qty: 90 CAPSULE | Refills: 1 | Status: SHIPPED | OUTPATIENT
Start: 2023-01-10

## 2023-01-10 RX ORDER — FINASTERIDE 5 MG/1
5 TABLET, FILM COATED ORAL DAILY
Qty: 90 TABLET | Refills: 1 | Status: SHIPPED | OUTPATIENT
Start: 2023-01-10

## 2023-01-10 RX ORDER — PANTOPRAZOLE SODIUM 40 MG/1
40 TABLET, DELAYED RELEASE ORAL DAILY
Qty: 90 TABLET | Refills: 2 | Status: SHIPPED | OUTPATIENT
Start: 2023-01-10 | End: 2023-04-10

## 2023-01-10 RX ORDER — DULOXETIN HYDROCHLORIDE 60 MG/1
60 CAPSULE, DELAYED RELEASE ORAL DAILY
Qty: 90 CAPSULE | Refills: 1 | Status: SHIPPED | OUTPATIENT
Start: 2023-01-10

## 2023-01-10 RX ORDER — CLONIDINE 0.1 MG/24H
1 PATCH, EXTENDED RELEASE TRANSDERMAL
Qty: 4 PATCH | Refills: 1 | Status: SHIPPED | OUTPATIENT
Start: 2023-01-10

## 2023-01-10 RX ORDER — ACETAMINOPHEN AND CODEINE PHOSPHATE 300; 30 MG/1; MG/1
1 TABLET ORAL
Qty: 20 TABLET | Refills: 0 | Status: SHIPPED | OUTPATIENT
Start: 2023-01-10 | End: 2023-02-09

## 2023-01-10 RX ORDER — FLASH GLUCOSE SENSOR
KIT MISCELLANEOUS
Qty: 2 KIT | Refills: 5 | Status: SHIPPED | OUTPATIENT
Start: 2023-01-10

## 2023-01-10 NOTE — PROGRESS NOTES
Chief Complaint   Patient presents with    Letter     Letter to disability     Back Pain     Follow up on back pain, pain level 10/10     Medication Refill        Visit Vitals  BP (!) 177/91 (BP 1 Location: Right upper arm, BP Patient Position: Sitting)   Pulse 87   Temp 97.8 °F (36.6 °C) (Temporal)   Resp 18   Ht 5' 9\" (1.753 m)   Wt 188 lb (85.3 kg)   SpO2 94%   BMI 27.76 kg/m²        Health Maintenance Due   Topic    Hepatitis B Vaccine (1 of 3 - Risk 3-dose series)    Foot Exam Q1     Diabetic Alb to Cr ratio (uACR) test         1. \"Have you been to the ER, urgent care clinic since your last visit? Hospitalized since your last visit? \" No    2. \"Have you seen or consulted any other health care providers outside of the [address] [place] [address] since your last visit? \" No     3. For patients aged 39-70: Has the patient had a colonoscopy / FIT/ Cologuard? NA - based on age      If the patient is female:    4. For patients aged 41-77: Has the patient had a mammogram within the past 2 years? NA - based on age or sex      11. For patients aged 21-65: Has the patient had a pap smear?  NA - based on age or sex

## 2023-01-10 NOTE — PROGRESS NOTES
Srinivasan Starkey is a 38 y.o.  male and presents with     Chief Complaint   Patient presents with    Letter     Letter to disability     Back Pain     Follow up on back pain, pain level 10/10     Medication Refill     Pt is accompanied by his mother  Pt was ad admitted to  hospital in [facility] wit hrenal failure and fluid overload and was started on hemodialysis. Had fistula placed on left arm  ACS was ruled out. Anemia was felt to be chronic disease/  ECHO showed EF -45-50 %  CXR shoed fluid overload   Pt also had DKA that was treated  Pt has been getting dialysed through the port in his rt chest wall. Had fistula placed on left arm recntly  Will be getting staples removed soon. Pt says he used to be on insulin pump but endocrinology office will not repsond  HE wants to go back on Lantus pen and HUmalog  Also requtesting refills on Lyrica. Has neuropathy in both feet  HAs pain in legs. Pt had laser treatment on his one eye  Pt wants to work but his health would not permit him to work. He has h/o recurrent falls  He gets dialysed three times weekly.     Dexcom meter is not working   Requesting [facility]    Past Medical History:   Diagnosis Date    Bipolar 1 disorder, depressed ([address]     Bipolar disorder ([address]     Chronic kidney disease, stage 3a ([address]     Depression     Diabetes ([address]     DKA, type 1 ([address] 1/27/2013    diagnosed age 23    DKA, type 1 ([address]     H/O noncompliance with medical treatment, presenting hazards to health     MRSA (methicillin resistant staph aureus) culture positive     MRSA (methicillin resistant Staphylococcus aureus)     Face    Noncompliance with medication regimen     Second hand smoke exposure     Seizure ([address]     Seizures ([address] 2006 or 2007    one episode during halfway     Past Surgical History:   Procedure Laterality Date    HX HEART CATHETERIZATION  11/2022    AT [facility] AFTER WHAT PATIENT CALLED \"MILD HEART ATTACK\"    HX HEENT      top left wisdom tooth    HX ORTHOPAEDIC Left     wrist; MCV    IR INSERT NON TUNL CVC OVER 5 YRS  09/07/2022    IR INSERT TUNL CVC W/O PORT OVER 5 YR  09/09/2022    UPPER GI ENDOSCOPY,BIOPSY  11/20/2018          Current Outpatient Medications   Medication Sig    carvediloL (COREG) 12.5 mg tablet Take 1 Tablet by mouth two (2) times daily (with meals). amLODIPine (NORVASC) 10 mg tablet Take 1 Tablet by mouth daily. finasteride (PROSCAR) 5 mg tablet Take 1 Tablet by mouth daily. hydrALAZINE (APRESOLINE) 100 mg tablet Take 1 Tablet by mouth three (3) times daily. losartan (COZAAR) 50 mg tablet Take 1 Tablet by mouth daily. pantoprazole (PROTONIX) 40 mg tablet Take 1 Tablet by mouth daily for 90 days. pregabalin (Lyrica) 75 mg capsule Take 1 Capsule by mouth nightly. Max Daily Amount: 75 mg.    tamsulosin (FLOMAX) 0.4 mg capsule Take 1 Capsule by mouth daily. sucralfate (CARAFATE) 1 gram tablet Take 1 Tablet by mouth Before breakfast, lunch, dinner and at bedtime for 62 days. rosuvastatin (CRESTOR) 40 mg tablet Take 1 Tablet by mouth nightly. insulin glargine (LANTUS,BASAGLAR) 100 unit/mL (3 mL) inpn [place] 10 units subcut daily    insulin lispro (HUMALOG) 100 unit/mL kwikpen Administer 10 units before each meal    flash glucose sensor (FreeStyle Connor 14 Day Sensor) kit Administer blood sugars four times daily    flash glucose scanning reader (FreeStyle Connor 14 Day [place]) misc Administer blood sugars four times daily    cloNIDine (CATAPRES) 0.1 mg/24 hr ptwk 1 Patch by TransDERmal route every seven (7) days. acetaminophen-codeine (Tylenol-Codeine #3) 300-30 mg per tablet Take 1 Tablet by mouth nightly as needed for Pain for up to 30 days. Max Daily Amount: 1 Tablet. DULoxetine (CYMBALTA) 60 mg capsule Take 1 Capsule by mouth daily. sodium bicarbonate 650 mg tablet Take 650 mg by mouth three (3) times daily. OXYGEN-AIR DELIVERY SYSTEMS Take 2 L/min by inhalation continuous.     OXYGEN-AIR DELIVERY SYSTEMS Take 2 L/min by inhalation continuous. furosemide (LASIX) 40 mg tablet Take 2 Tablets by mouth daily. (Patient taking differently: Take 40 mg by mouth daily.)    sevelamer (RENAGEL) 800 mg tablet Take 1,600 mg by mouth three (3) times daily (with meals). lancets misc ACHS    Blood-Glucose Meter monitoring kit ACHS    glucose blood VI test strips (blood glucose test) strip ACHS    Insulin Syringe-Needle U-100 1 mL 28 x 5/16\" syrg 1 Syringe by SubCUTAneous route Before breakfast, lunch, dinner and at bedtime. DULoxetine (CYMBALTA) 60 mg capsule Take 1 capsule by mouth once daily    naloxone (Narcan) 4 mg/actuation nasal spray Use 1 spray intranasally, then discard. Repeat with new spray every 2 min as needed for opioid overdose symptoms, alternating nostrils. Walker (Ultra-Light Rollator) misc Use while ambulating    Insulin Needles, Disposable, 31 gauge x 5/16\" ndle Dx code E11.65, use 6x daily    fluconazole (DIFLUCAN) 100 mg tablet Take 100 mg by mouth daily. FDA advises cautious prescribing of oral fluconazole in pregnancy. Ketone Blood Test strp 50 Each by Does Not Apply route as needed for PRN Reason (Other) (as needed for suspected dka). (Patient not taking: Reported on 1/10/2023)    Dexcom G6  misc Use with the dexcom device to check blood sugars 4 times a day (Patient not taking: No sig reported)    Dexcom G6 Sensor brandi Use as directed every 10 days (Patient not taking: Reported on 1/10/2023)    Dexcom G6 Transmitter brandi Use as directed (Patient not taking: Reported on 1/10/2023)     No current facility-administered medications for this visit.      Health Maintenance   Topic Date Due    Hepatitis B Vaccine (1 of 3 - Risk 3-dose series) Never done    Foot Exam Q1  03/07/2021    Diabetic Alb to Cr ratio (uACR) test  08/10/2022    Shingles Vaccine (1 of 2) 04/17/2023 (Originally 11/22/2001)    Flu Vaccine (1) 06/30/2023 (Originally 8/1/2022)    COVID-19 Vaccine (3 - Booster for Moderna series) 12/31/2023 (Originally 12/8/2021)    Depression Screen  02/24/2023    A1C test (Diabetic or Prediabetic)  02/28/2023    Lipid Screen  04/01/2023    GFR test (Diabetes, CKD 3-4, OR last GFR 15-59)  12/29/2023    Eye Exam Retinal or Dilated  08/01/2024    DTaP/Tdap/Td series (2 - Td or Tdap) 03/09/2028    Hepatitis C Screening  Completed    Pneumococcal 0-64 years  Completed     Immunization History   Administered Date(s) Administered    (RETIRED) Pneumococcal Vaccine (Unspecified Type) 08/18/2011    COVID-19, MODERNA BLUE border, Primary or Immunocompromised, (age 18y+), IM, 100 mcg/0.5mL 09/11/2021, 10/13/2021    MMR 03/23/1995    Pneumococcal Polysaccharide (PPSV-23) 08/18/2011    Rabies Vaccine IM 09/17/1992    TD Vaccine 03/08/2011    Tdap 03/09/2018     No LMP for male patient. Allergies and Intolerances:   No Known Allergies    Family History:   Family History   Problem Relation Age of Onset    Diabetes Mother     Diabetes Other         neice, type 1        Social History:   He  reports that he quit smoking about 2 years ago. His smoking use included cigarettes. He started smoking about 23 years ago. He has a 1.60 pack-year smoking history. He has never used smokeless tobacco.  He  reports no history of alcohol use.             Review of Systems:   General: negative for - chills, fatigue, fever, weight change  Psych: negative for - anxiety, depression, irritability or mood swings  ENT: negative for - headaches, hearing change, nasal congestion, oral lesions, sneezing or sore throat  Heme/ Lymph: negative for - bleeding problems, bruising, pallor or swollen lymph nodes  Endo: negative for - hot flashes, polydipsia/polyuria or temperature intolerance  Resp: negative for - cough, shortness of breath or wheezing  CV: negative for - chest pain, edema or palpitations  GI: negative for - abdominal pain, change in bowel habits, constipation, diarrhea or nausea/vomiting  : negative for - dysuria, hematuria, incontinence, pelvic pain or vulvar/vaginal symptoms  MSK: negative for - joint pain, joint swelling or muscle pain  Neuro: negative for - confusion, headaches, seizures or weakness  Derm: negative for - dry skin, hair changes, rash or skin lesion changes          Physical:   Vitals:   Vitals:    01/10/23 1440   BP: (!) 177/91   Pulse: 87   Resp: 18   Temp: 97.8 °F (36.6 °C)   TempSrc: Temporal   SpO2: 94%   Weight: 188 lb (85.3 kg)   Height: 5' 9\" (1.753 m)           Exam:   HEENT- atraumatic,normocephalic, awake, oriented, well nourished  Neck - supple,no enlarged lymph nodes, no JVD, no thyromegaly  Chest- CTA, no rhonchi, no crackles  Heart- rrr, no murmurs / gallop/rub  Abdomen- soft,BS+,NT, no hepatosplenomegaly  Ext - no c/c/edema , fistula on left arm, staples in place  Neuro- no focal deficits. Power 5/5 all extremities  Skin - warm,dry, no obvious rashes. Review of Data:   LABS:   Lab Results   Component Value Date/Time    WBC 6.1 12/26/2022 10:30 AM    HGB 9.6 (L) 12/26/2022 10:30 AM    HCT 31.1 (L) 12/26/2022 10:30 AM    PLATELET 843 36/54/1790 10:30 AM     Lab Results   Component Value Date/Time    Sodium 134 (L) 12/26/2022 10:30 AM    Potassium 5.2 (H) 12/26/2022 10:30 AM    Chloride 102 12/26/2022 10:30 AM    CO2 23 12/26/2022 10:30 AM    Glucose 222 (H) 12/26/2022 10:30 AM    BUN 54 (H) 12/26/2022 10:30 AM    Creatinine 5.37 (H) 12/26/2022 10:30 AM     Lab Results   Component Value Date/Time    Cholesterol, total 192 11/15/2021 12:01 PM    HDL Cholesterol 61 11/15/2021 12:01 PM    LDL, calculated 89.4 11/15/2021 12:01 PM    Triglyceride 208 (H) 11/15/2021 12:01 PM     No components found for: GPT        Impression / Plan:        ICD-10-CM ICD-9-CM    1. Primary hypertension  I10 401.9 carvediloL (COREG) 12.5 mg tablet      amLODIPine (NORVASC) 10 mg tablet      hydrALAZINE (APRESOLINE) 100 mg tablet      losartan (COZAAR) 50 mg tablet      cloNIDine (CATAPRES) 0.1 mg/24 hr ptwk      2. Recurrent falls  R29.6 V15.88 pregabalin (Lyrica) 75 mg capsule      acetaminophen-codeine (Tylenol-Codeine #3) 300-30 mg per tablet      3. Hypercholesteremia  E78.00 272.0 rosuvastatin (CRESTOR) 40 mg tablet      4. ESRD (end stage renal disease) ([facility]  N18.6 585.6       5. DM type 2, goal HbA1c < 7% ([facility])  E11.9 250.00 insulin glargine (LANTUS,BASAGLAR) 100 unit/mL (3 mL) inpn      insulin lispro (HUMALOG) 100 unit/mL kwikpen      flash glucose sensor (FreeStyle Connor 14 Day Sensor) kit      flash glucose scanning reader (FreeStyle Connor 14 Day [place]) misc      DISCONTINUED: insulin lispro (HUMALOG) 100 unit/mL injection      6. Gastric ulcer without hemorrhage or perforation, unspecified chronicity  K25.9 531.90 pantoprazole (PROTONIX) 40 mg tablet      sucralfate (CARAFATE) 1 gram tablet      7. Esophagitis  K20.90 530.10       8. Other diabetic neurological complication associated with type 2 diabetes mellitus (HCC)  E11.49 250.60 pregabalin (Lyrica) 75 mg capsule      DULoxetine (CYMBALTA) 60 mg capsule      9. Benign prostatic hyperplasia with urinary frequency  N40.1 600.01 tamsulosin (FLOMAX) 0.4 mg capsule    R35.0 788.41           In my medical opinion pt is totally disabled and is not fit to work. Letter given to pt. Asked pt to get letter form [employer]. Has been missing OT/PT as he has to go for hemodialysis three times a week      Spent over 50 minutes with pt going over all his health concerns  and reviewing hospital notes , records, med refills, letter    Explained to patient risk benefits of the medications. Advised patient to stop meds if having any side effects. Pt verbalized understanding of the instructions. I have discussed the diagnosis with the patient and the intended plan as seen in the above orders. The patient has received an after-visit summary and questions were answered concerning future plans. I have discussed medication side effects and warnings with the patient as well. I have reviewed the plan of care with the patient, accepted their input and they are in agreement with the treatment goals. Reviewed plan of care. Patient has provided input and agrees with goals. Follow-up and Dispositions    Return in about 3 months (around 4/10/2023).          Martha Antunez MD

## 2023-01-10 NOTE — LETTER
1/10/2023 3:41 PM    Mr. Naomie Gilliam [facility] [phone]      To whom it may concern    This is to state that the above patient has multiple medical problems including Type 1 DM, Neuropathy , retinopathy, End stage renal disease on hemodialysis, uncontrolled Hypertension and hypercholesroleia    Pt is totally disable in my medical opinion and  Is not fit to work.           Sincerely,      Kofi Ariza MD

## 2023-01-12 ENCOUNTER — TELEPHONE (OUTPATIENT)
Dept: PRIMARY CARE CLINIC | Age: 43
End: 2023-01-12

## 2023-01-12 NOTE — TELEPHONE ENCOUNTER
[facility] filled a number of prescriptions for the patient, but their pharmacy is saying prior auths are needed for many of them. Patient could not provide the names of the medications but said it should be seven of them. Informed patient we would notify [facility] and reach out with any updates.

## 2023-01-16 NOTE — TELEPHONE ENCOUNTER
Prior auth submitted to ins for Pantoprazole Sodium 40MG dr tablets  Via covermymed. Key # [id number]     Prior auth submitted to ins for FreeStyle Connor 14 Day Sensor  Via covermymed. Key #  [id number]      Prior auth submitted to ins for Acetaminophen-Codeine #3 300-30MG tablets  Via covermymed.   Key # [id number]

## 2023-01-25 DIAGNOSIS — K25.9 GASTRIC ULCER WITHOUT HEMORRHAGE OR PERFORATION, UNSPECIFIED CHRONICITY: ICD-10-CM

## 2023-01-25 DIAGNOSIS — E11.9 DM TYPE 2, GOAL HBA1C < 7% (HCC): ICD-10-CM

## 2023-01-25 NOTE — TELEPHONE ENCOUNTER
[facility] did not receive all the medicines that the patient needs. Also, the pharmacist is saying the insurance is fussing about the insulin, it needs to be written for one month at a time with refills.

## 2023-01-27 RX ORDER — SUCRALFATE 1 G/1
1 TABLET ORAL
Qty: 120 TABLET | Refills: 3 | Status: SHIPPED | OUTPATIENT
Start: 2023-01-27 | End: 2023-03-30

## 2023-01-27 RX ORDER — FUROSEMIDE 40 MG/1
40 TABLET ORAL DAILY
Qty: 30 TABLET | Refills: 2 | Status: SHIPPED | OUTPATIENT
Start: 2023-01-27

## 2023-01-27 RX ORDER — INSULIN GLARGINE 100 [IU]/ML
INJECTION, SOLUTION SUBCUTANEOUS
Qty: 5 PEN | Refills: 3 | Status: SHIPPED | OUTPATIENT
Start: 2023-01-27

## 2023-01-27 RX ORDER — INSULIN LISPRO 100 [IU]/ML
INJECTION, SOLUTION INTRAVENOUS; SUBCUTANEOUS
Qty: 5 PEN | Refills: 5 | Status: SHIPPED | OUTPATIENT
Start: 2023-01-27

## 2023-01-27 RX ORDER — FLASH GLUCOSE SENSOR
KIT MISCELLANEOUS
Qty: 2 KIT | Refills: 5 | Status: SHIPPED | OUTPATIENT
Start: 2023-01-27

## 2023-01-27 RX ORDER — FLUCONAZOLE 100 MG/1
100 TABLET ORAL DAILY
OUTPATIENT
Start: 2023-01-27

## 2023-03-04 ENCOUNTER — APPOINTMENT (OUTPATIENT)
Dept: GENERAL RADIOLOGY | Age: 43
DRG: 628 | End: 2023-03-04
Attending: STUDENT IN AN ORGANIZED HEALTH CARE EDUCATION/TRAINING PROGRAM
Payer: MEDICARE

## 2023-03-04 ENCOUNTER — HOSPITAL ENCOUNTER (INPATIENT)
Age: 43
LOS: 9 days | Discharge: HOME | DRG: 628 | End: 2023-03-13
Attending: STUDENT IN AN ORGANIZED HEALTH CARE EDUCATION/TRAINING PROGRAM | Admitting: INTERNAL MEDICINE
Payer: MEDICARE

## 2023-03-04 ENCOUNTER — APPOINTMENT (OUTPATIENT)
Dept: ULTRASOUND IMAGING | Age: 43
DRG: 628 | End: 2023-03-04
Attending: INTERNAL MEDICINE
Payer: MEDICARE

## 2023-03-04 DIAGNOSIS — J81.0 ACUTE PULMONARY EDEMA (HCC): ICD-10-CM

## 2023-03-04 DIAGNOSIS — E11.9 DM TYPE 2, GOAL HBA1C < 7% (HCC): ICD-10-CM

## 2023-03-04 DIAGNOSIS — J96.01 ACUTE RESPIRATORY FAILURE WITH HYPOXIA (HCC): Primary | ICD-10-CM

## 2023-03-04 DIAGNOSIS — R73.9 HYPERGLYCEMIA: ICD-10-CM

## 2023-03-04 DIAGNOSIS — E10.65 HYPERGLYCEMIA DUE TO TYPE 1 DIABETES MELLITUS (HCC): ICD-10-CM

## 2023-03-04 PROBLEM — J96.21 ACUTE ON CHRONIC RESPIRATORY FAILURE WITH HYPOXIA (HCC): Status: ACTIVE | Noted: 2023-01-01

## 2023-03-04 PROBLEM — J96.21 ACUTE ON CHRONIC RESPIRATORY FAILURE WITH HYPOXIA (HCC): Status: ACTIVE | Noted: 2023-03-04

## 2023-03-04 LAB
ADMINISTERED INITIALS, ADMINIT: NORMAL
ALBUMIN SERPL-MCNC: 2.6 G/DL (ref 3.5–5)
ALBUMIN/GLOB SERPL: 0.7 (ref 1.1–2.2)
ALP SERPL-CCNC: 156 U/L (ref 45–117)
ALT SERPL-CCNC: 16 U/L (ref 12–78)
ANION GAP BLD CALC-SCNC: 11 (ref 10–20)
ANION GAP SERPL CALC-SCNC: 11 MMOL/L (ref 5–15)
ANION GAP SERPL CALC-SCNC: 11 MMOL/L (ref 5–15)
ANION GAP SERPL CALC-SCNC: 9 MMOL/L (ref 5–15)
APPEARANCE UR: CLEAR
AST SERPL-CCNC: 9 U/L (ref 15–37)
B-OH-BUTYR SERPL-SCNC: 1.26 MMOL/L
BACTERIA URNS QL MICRO: NEGATIVE /HPF
BASE DEFICIT BLD-SCNC: 7.6 MMOL/L
BASOPHILS # BLD: 0.1 K/UL (ref 0–0.1)
BASOPHILS NFR BLD: 1 % (ref 0–1)
BILIRUB SERPL-MCNC: 0.5 MG/DL (ref 0.2–1)
BILIRUB UR QL: NEGATIVE
BNP SERPL-MCNC: ABNORMAL PG/ML
BUN SERPL-MCNC: 60 MG/DL (ref 6–20)
BUN SERPL-MCNC: 63 MG/DL (ref 6–20)
BUN SERPL-MCNC: 68 MG/DL (ref 6–20)
BUN/CREAT SERPL: 9 (ref 12–20)
CA-I BLD-MCNC: 1.07 MMOL/L (ref 1.12–1.32)
CALCIUM SERPL-MCNC: 7.8 MG/DL (ref 8.5–10.1)
CALCIUM SERPL-MCNC: 8.1 MG/DL (ref 8.5–10.1)
CALCIUM SERPL-MCNC: 8.2 MG/DL (ref 8.5–10.1)
CHLORIDE BLD-SCNC: 94 MMOL/L (ref 100–108)
CHLORIDE SERPL-SCNC: 95 MMOL/L (ref 97–108)
CHLORIDE SERPL-SCNC: 95 MMOL/L (ref 97–108)
CHLORIDE SERPL-SCNC: 96 MMOL/L (ref 97–108)
CO2 BLD-SCNC: 21 MMOL/L (ref 19–24)
CO2 SERPL-SCNC: 21 MMOL/L (ref 21–32)
CO2 SERPL-SCNC: 22 MMOL/L (ref 21–32)
CO2 SERPL-SCNC: 22 MMOL/L (ref 21–32)
COLOR UR: ABNORMAL
CREAT SERPL-MCNC: 6.8 MG/DL (ref 0.7–1.3)
CREAT SERPL-MCNC: 6.88 MG/DL (ref 0.7–1.3)
CREAT SERPL-MCNC: 7.16 MG/DL (ref 0.7–1.3)
CREAT UR-MCNC: 7.4 MG/DL (ref 0.6–1.3)
D50 ADMINISTERED, D50ADM: 0 ML
D50 ORDER, D50ORD: 0 ML
DIFFERENTIAL METHOD BLD: ABNORMAL
EOSINOPHIL # BLD: 0.2 K/UL (ref 0–0.4)
EOSINOPHIL NFR BLD: 3 % (ref 0–7)
EPITH CASTS URNS QL MICRO: ABNORMAL /LPF
ERYTHROCYTE [DISTWIDTH] IN BLOOD BY AUTOMATED COUNT: 15.8 % (ref 11.5–14.5)
EST. AVERAGE GLUCOSE BLD GHB EST-MCNC: 306 MG/DL
GLOBULIN SER CALC-MCNC: 3.8 G/DL (ref 2–4)
GLUCOSE BLD STRIP.AUTO-MCNC: 178 MG/DL (ref 65–117)
GLUCOSE BLD STRIP.AUTO-MCNC: 235 MG/DL (ref 65–117)
GLUCOSE BLD STRIP.AUTO-MCNC: 269 MG/DL (ref 65–117)
GLUCOSE BLD STRIP.AUTO-MCNC: 322 MG/DL (ref 65–117)
GLUCOSE BLD STRIP.AUTO-MCNC: 353 MG/DL (ref 65–117)
GLUCOSE BLD STRIP.AUTO-MCNC: 391 MG/DL (ref 65–117)
GLUCOSE BLD STRIP.AUTO-MCNC: 436 MG/DL (ref 65–117)
GLUCOSE BLD STRIP.AUTO-MCNC: 492 MG/DL (ref 65–117)
GLUCOSE BLD STRIP.AUTO-MCNC: 536 MG/DL (ref 65–117)
GLUCOSE BLD STRIP.AUTO-MCNC: 591 MG/DL (ref 74–106)
GLUCOSE SERPL-MCNC: 377 MG/DL (ref 65–100)
GLUCOSE SERPL-MCNC: 595 MG/DL (ref 65–100)
GLUCOSE SERPL-MCNC: 617 MG/DL (ref 65–100)
GLUCOSE UR STRIP.AUTO-MCNC: >1000 MG/DL
GLUCOSE, GLC: 178 MG/DL
GLUCOSE, GLC: 235 MG/DL
GLUCOSE, GLC: 269 MG/DL
GLUCOSE, GLC: 322 MG/DL
GLUCOSE, GLC: 391 MG/DL
GLUCOSE, GLC: 436 MG/DL
GLUCOSE, GLC: 492 MG/DL
GLUCOSE, GLC: 536 MG/DL
HBA1C MFR BLD: 12.3 % (ref 4–5.6)
HCO3 BLDA-SCNC: 21 MMOL/L
HCT VFR BLD AUTO: 39.8 % (ref 36.6–50.3)
HGB BLD-MCNC: 11.9 G/DL (ref 12.1–17)
HGB UR QL STRIP: ABNORMAL
HIGH TARGET, HITG: 200 MG/DL
IMM GRANULOCYTES # BLD AUTO: 0 K/UL (ref 0–0.04)
IMM GRANULOCYTES NFR BLD AUTO: 0 % (ref 0–0.5)
INSULIN ADMINSTERED, INSADM: 13 UNITS/HOUR
INSULIN ADMINSTERED, INSADM: 14 UNITS/HOUR
INSULIN ADMINSTERED, INSADM: 14.6 UNITS/HOUR
INSULIN ADMINSTERED, INSADM: 15 UNITS/HOUR
INSULIN ADMINSTERED, INSADM: 15.7 UNITS/HOUR
INSULIN ADMINSTERED, INSADM: 16.6 UNITS/HOUR
INSULIN ADMINSTERED, INSADM: 9.4 UNITS/HOUR
INSULIN ADMINSTERED, INSADM: 9.5 UNITS/HOUR
INSULIN ORDER, INSORD: 13 UNITS/HOUR
INSULIN ORDER, INSORD: 14 UNITS/HOUR
INSULIN ORDER, INSORD: 14.6 UNITS/HOUR
INSULIN ORDER, INSORD: 15 UNITS/HOUR
INSULIN ORDER, INSORD: 15.7 UNITS/HOUR
INSULIN ORDER, INSORD: 16.6 UNITS/HOUR
INSULIN ORDER, INSORD: 9.4 UNITS/HOUR
INSULIN ORDER, INSORD: 9.5 UNITS/HOUR
KETONES UR QL STRIP.AUTO: ABNORMAL MG/DL
LACTATE BLD-SCNC: 0.6 MMOL/L (ref 0.4–2)
LEUKOCYTE ESTERASE UR QL STRIP.AUTO: NEGATIVE
LOW TARGET, LOT: 150 MG/DL
LYMPHOCYTES # BLD: 0.4 K/UL (ref 0.8–3.5)
LYMPHOCYTES NFR BLD: 5 % (ref 12–49)
MAGNESIUM SERPL-MCNC: 2.6 MG/DL (ref 1.6–2.4)
MAGNESIUM SERPL-MCNC: 2.7 MG/DL (ref 1.6–2.4)
MCH RBC QN AUTO: 24.9 PG (ref 26–34)
MCHC RBC AUTO-ENTMCNC: 29.9 G/DL (ref 30–36.5)
MCV RBC AUTO: 83.3 FL (ref 80–99)
MINUTES UNTIL NEXT BG, NBG: 60 MIN
MONOCYTES # BLD: 0.3 K/UL (ref 0–1)
MONOCYTES NFR BLD: 4 % (ref 5–13)
MULTIPLIER, MUL: 0.02
MULTIPLIER, MUL: 0.03
MULTIPLIER, MUL: 0.04
MULTIPLIER, MUL: 0.05
MULTIPLIER, MUL: 0.06
MULTIPLIER, MUL: 0.07
MULTIPLIER, MUL: 0.08
MULTIPLIER, MUL: 0.08
NEUTS SEG # BLD: 6.1 K/UL (ref 1.8–8)
NEUTS SEG NFR BLD: 87 % (ref 32–75)
NITRITE UR QL STRIP.AUTO: NEGATIVE
NRBC # BLD: 0 K/UL (ref 0–0.01)
NRBC BLD-RTO: 0 PER 100 WBC
ORDER INITIALS, ORDINIT: NORMAL
PCO2 BLDV: 53.6 MMHG (ref 41–51)
PH BLDV: 7.2 (ref 7.32–7.42)
PH UR STRIP: 5.5 (ref 5–8)
PHOSPHATE SERPL-MCNC: 7.8 MG/DL (ref 2.6–4.7)
PLATELET # BLD AUTO: 181 K/UL (ref 150–400)
PMV BLD AUTO: 11.6 FL (ref 8.9–12.9)
PO2 BLDV: 60 MMHG (ref 25–40)
POTASSIUM BLD-SCNC: 4.4 MMOL/L (ref 3.5–5.5)
POTASSIUM SERPL-SCNC: 3.9 MMOL/L (ref 3.5–5.1)
POTASSIUM SERPL-SCNC: 4.2 MMOL/L (ref 3.5–5.1)
POTASSIUM SERPL-SCNC: 4.6 MMOL/L (ref 3.5–5.1)
PROT SERPL-MCNC: 6.4 G/DL (ref 6.4–8.2)
PROT UR STRIP-MCNC: >300 MG/DL
RBC # BLD AUTO: 4.78 M/UL (ref 4.1–5.7)
RBC #/AREA URNS HPF: ABNORMAL /HPF (ref 0–5)
RBC MORPH BLD: ABNORMAL
RBC MORPH BLD: ABNORMAL
SAO2 % BLD: 84 %
SERVICE CMNT-IMP: ABNORMAL
SODIUM BLD-SCNC: 126 MMOL/L (ref 136–145)
SODIUM SERPL-SCNC: 126 MMOL/L (ref 136–145)
SODIUM SERPL-SCNC: 128 MMOL/L (ref 136–145)
SODIUM SERPL-SCNC: 128 MMOL/L (ref 136–145)
SP GR UR REFRACTOMETRY: 1.02
SPECIMEN SITE: ABNORMAL
TROPONIN I SERPL HS-MCNC: 13 NG/L (ref 0–76)
UA: UC IF INDICATED,UAUC: ABNORMAL
UROBILINOGEN UR QL STRIP.AUTO: 0.2 EU/DL (ref 0.2–1)
WBC # BLD AUTO: 7.1 K/UL (ref 4.1–11.1)
WBC URNS QL MICRO: ABNORMAL /HPF (ref 0–4)

## 2023-03-04 PROCEDURE — 76775 US EXAM ABDO BACK WALL LIM: CPT

## 2023-03-04 PROCEDURE — 82010 KETONE BODYS QUAN: CPT

## 2023-03-04 PROCEDURE — 74011000258 HC RX REV CODE- 258: Performed by: INTERNAL MEDICINE

## 2023-03-04 PROCEDURE — 82962 GLUCOSE BLOOD TEST: CPT

## 2023-03-04 PROCEDURE — 83036 HEMOGLOBIN GLYCOSYLATED A1C: CPT

## 2023-03-04 PROCEDURE — 85025 COMPLETE CBC W/AUTO DIFF WBC: CPT

## 2023-03-04 PROCEDURE — 84100 ASSAY OF PHOSPHORUS: CPT

## 2023-03-04 PROCEDURE — 93005 ELECTROCARDIOGRAM TRACING: CPT

## 2023-03-04 PROCEDURE — 74011000250 HC RX REV CODE- 250: Performed by: INTERNAL MEDICINE

## 2023-03-04 PROCEDURE — 74011636637 HC RX REV CODE- 636/637: Performed by: INTERNAL MEDICINE

## 2023-03-04 PROCEDURE — 74011250636 HC RX REV CODE- 250/636: Performed by: INTERNAL MEDICINE

## 2023-03-04 PROCEDURE — 84484 ASSAY OF TROPONIN QUANT: CPT

## 2023-03-04 PROCEDURE — 80048 BASIC METABOLIC PNL TOTAL CA: CPT

## 2023-03-04 PROCEDURE — 99285 EMERGENCY DEPT VISIT HI MDM: CPT

## 2023-03-04 PROCEDURE — 71045 X-RAY EXAM CHEST 1 VIEW: CPT

## 2023-03-04 PROCEDURE — 80053 COMPREHEN METABOLIC PANEL: CPT

## 2023-03-04 PROCEDURE — 36415 COLL VENOUS BLD VENIPUNCTURE: CPT

## 2023-03-04 PROCEDURE — 65270000046 HC RM TELEMETRY

## 2023-03-04 PROCEDURE — 81001 URINALYSIS AUTO W/SCOPE: CPT

## 2023-03-04 PROCEDURE — 83880 ASSAY OF NATRIURETIC PEPTIDE: CPT

## 2023-03-04 PROCEDURE — 82947 ASSAY GLUCOSE BLOOD QUANT: CPT

## 2023-03-04 PROCEDURE — 74011250637 HC RX REV CODE- 250/637: Performed by: INTERNAL MEDICINE

## 2023-03-04 PROCEDURE — 83735 ASSAY OF MAGNESIUM: CPT

## 2023-03-04 RX ORDER — CARVEDILOL 12.5 MG/1
12.5 TABLET ORAL 2 TIMES DAILY WITH MEALS
Status: DISCONTINUED | OUTPATIENT
Start: 2023-03-04 | End: 2023-03-13 | Stop reason: HOSPADM

## 2023-03-04 RX ORDER — ONDANSETRON 2 MG/ML
4 INJECTION INTRAMUSCULAR; INTRAVENOUS
Status: DISCONTINUED | OUTPATIENT
Start: 2023-03-04 | End: 2023-03-13 | Stop reason: HOSPADM

## 2023-03-04 RX ORDER — HEPARIN SODIUM 5000 [USP'U]/ML
5000 INJECTION, SOLUTION INTRAVENOUS; SUBCUTANEOUS EVERY 12 HOURS
Status: DISCONTINUED | OUTPATIENT
Start: 2023-03-04 | End: 2023-03-13 | Stop reason: HOSPADM

## 2023-03-04 RX ORDER — ONDANSETRON 4 MG/1
4 TABLET, ORALLY DISINTEGRATING ORAL
Status: DISCONTINUED | OUTPATIENT
Start: 2023-03-04 | End: 2023-03-13 | Stop reason: HOSPADM

## 2023-03-04 RX ORDER — LOSARTAN POTASSIUM 50 MG/1
50 TABLET ORAL DAILY
Status: DISCONTINUED | OUTPATIENT
Start: 2023-03-05 | End: 2023-03-13 | Stop reason: HOSPADM

## 2023-03-04 RX ORDER — TAMSULOSIN HYDROCHLORIDE 0.4 MG/1
0.4 CAPSULE ORAL DAILY
Status: DISCONTINUED | OUTPATIENT
Start: 2023-03-05 | End: 2023-03-13 | Stop reason: HOSPADM

## 2023-03-04 RX ORDER — AMLODIPINE BESYLATE 5 MG/1
10 TABLET ORAL DAILY
Status: DISCONTINUED | OUTPATIENT
Start: 2023-03-05 | End: 2023-03-13 | Stop reason: HOSPADM

## 2023-03-04 RX ORDER — IBUPROFEN 200 MG
4 TABLET ORAL AS NEEDED
Status: DISCONTINUED | OUTPATIENT
Start: 2023-03-04 | End: 2023-03-05

## 2023-03-04 RX ORDER — GUAIFENESIN 100 MG/5ML
81 LIQUID (ML) ORAL DAILY
COMMUNITY
Start: 2022-12-27

## 2023-03-04 RX ORDER — SODIUM BICARBONATE 650 MG/1
650 TABLET ORAL 3 TIMES DAILY
Status: DISCONTINUED | OUTPATIENT
Start: 2023-03-04 | End: 2023-03-05

## 2023-03-04 RX ORDER — SODIUM CHLORIDE 0.9 % (FLUSH) 0.9 %
5-40 SYRINGE (ML) INJECTION EVERY 8 HOURS
Status: DISCONTINUED | OUTPATIENT
Start: 2023-03-04 | End: 2023-03-07

## 2023-03-04 RX ORDER — CLONIDINE 0.1 MG/24H
1 PATCH, EXTENDED RELEASE TRANSDERMAL
Status: DISCONTINUED | OUTPATIENT
Start: 2023-03-04 | End: 2023-03-13 | Stop reason: HOSPADM

## 2023-03-04 RX ORDER — FUROSEMIDE 40 MG/1
40 TABLET ORAL 2 TIMES DAILY
Status: DISCONTINUED | OUTPATIENT
Start: 2023-03-05 | End: 2023-03-13 | Stop reason: HOSPADM

## 2023-03-04 RX ORDER — FINASTERIDE 5 MG/1
5 TABLET, FILM COATED ORAL DAILY
Status: DISCONTINUED | OUTPATIENT
Start: 2023-03-05 | End: 2023-03-13 | Stop reason: HOSPADM

## 2023-03-04 RX ORDER — HYDRALAZINE HYDROCHLORIDE 50 MG/1
100 TABLET, FILM COATED ORAL 3 TIMES DAILY
Status: DISCONTINUED | OUTPATIENT
Start: 2023-03-04 | End: 2023-03-13 | Stop reason: HOSPADM

## 2023-03-04 RX ORDER — INSULIN GLARGINE 100 [IU]/ML
10 INJECTION, SOLUTION SUBCUTANEOUS
Status: DISCONTINUED | OUTPATIENT
Start: 2023-03-04 | End: 2023-03-08

## 2023-03-04 RX ORDER — ROSUVASTATIN CALCIUM 40 MG/1
40 TABLET, COATED ORAL
Status: DISCONTINUED | OUTPATIENT
Start: 2023-03-04 | End: 2023-03-13 | Stop reason: HOSPADM

## 2023-03-04 RX ORDER — SODIUM CHLORIDE 0.9 % (FLUSH) 0.9 %
5-40 SYRINGE (ML) INJECTION AS NEEDED
Status: DISCONTINUED | OUTPATIENT
Start: 2023-03-04 | End: 2023-03-08

## 2023-03-04 RX ORDER — ACETAMINOPHEN 325 MG/1
650 TABLET ORAL
Status: DISCONTINUED | OUTPATIENT
Start: 2023-03-04 | End: 2023-03-13 | Stop reason: HOSPADM

## 2023-03-04 RX ORDER — SEVELAMER CARBONATE 800 MG/1
1600 TABLET, FILM COATED ORAL
Status: DISCONTINUED | OUTPATIENT
Start: 2023-03-04 | End: 2023-03-13 | Stop reason: HOSPADM

## 2023-03-04 RX ORDER — DULOXETIN HYDROCHLORIDE 30 MG/1
60 CAPSULE, DELAYED RELEASE ORAL DAILY
Status: DISCONTINUED | OUTPATIENT
Start: 2023-03-05 | End: 2023-03-13 | Stop reason: HOSPADM

## 2023-03-04 RX ORDER — OXYCODONE HYDROCHLORIDE 5 MG/1
5 TABLET ORAL
Status: DISCONTINUED | OUTPATIENT
Start: 2023-03-04 | End: 2023-03-08

## 2023-03-04 RX ORDER — POLYETHYLENE GLYCOL 3350 17 G/17G
17 POWDER, FOR SOLUTION ORAL DAILY PRN
Status: DISCONTINUED | OUTPATIENT
Start: 2023-03-04 | End: 2023-03-13 | Stop reason: HOSPADM

## 2023-03-04 RX ORDER — FUROSEMIDE 40 MG/1
40 TABLET ORAL DAILY
Status: DISCONTINUED | OUTPATIENT
Start: 2023-03-05 | End: 2023-03-04

## 2023-03-04 RX ORDER — SUCRALFATE 1 G/1
1 TABLET ORAL
Status: DISCONTINUED | OUTPATIENT
Start: 2023-03-04 | End: 2023-03-13 | Stop reason: HOSPADM

## 2023-03-04 RX ORDER — INSULIN LISPRO 100 [IU]/ML
INJECTION, SOLUTION INTRAVENOUS; SUBCUTANEOUS
Status: DISCONTINUED | OUTPATIENT
Start: 2023-03-04 | End: 2023-03-05

## 2023-03-04 RX ORDER — PANTOPRAZOLE SODIUM 40 MG/1
40 TABLET, DELAYED RELEASE ORAL DAILY
Status: DISCONTINUED | OUTPATIENT
Start: 2023-03-05 | End: 2023-03-13 | Stop reason: HOSPADM

## 2023-03-04 RX ORDER — FUROSEMIDE 10 MG/ML
40 INJECTION INTRAMUSCULAR; INTRAVENOUS ONCE
Status: COMPLETED | OUTPATIENT
Start: 2023-03-04 | End: 2023-03-04

## 2023-03-04 RX ORDER — DEXTROSE MONOHYDRATE 100 MG/ML
0-250 INJECTION, SOLUTION INTRAVENOUS AS NEEDED
Status: DISCONTINUED | OUTPATIENT
Start: 2023-03-04 | End: 2023-03-05 | Stop reason: SDUPTHER

## 2023-03-04 RX ORDER — ACETAMINOPHEN 650 MG/1
650 SUPPOSITORY RECTAL
Status: DISCONTINUED | OUTPATIENT
Start: 2023-03-04 | End: 2023-03-13 | Stop reason: HOSPADM

## 2023-03-04 RX ORDER — GUAIFENESIN 100 MG/5ML
81 LIQUID (ML) ORAL DAILY
Status: DISCONTINUED | OUTPATIENT
Start: 2023-03-05 | End: 2023-03-13 | Stop reason: HOSPADM

## 2023-03-04 RX ADMIN — SODIUM BICARBONATE 650 MG: 650 TABLET ORAL at 16:13

## 2023-03-04 RX ADMIN — SODIUM CHLORIDE, PRESERVATIVE FREE 10 ML: 5 INJECTION INTRAVENOUS at 17:23

## 2023-03-04 RX ADMIN — INSULIN GLARGINE 10 UNITS: 100 INJECTION, SOLUTION SUBCUTANEOUS at 21:28

## 2023-03-04 RX ADMIN — SODIUM CHLORIDE, PRESERVATIVE FREE 10 ML: 5 INJECTION INTRAVENOUS at 21:28

## 2023-03-04 RX ADMIN — FUROSEMIDE 40 MG: 10 INJECTION, SOLUTION INTRAMUSCULAR; INTRAVENOUS at 15:58

## 2023-03-04 RX ADMIN — SUCRALFATE 1 G: 1 TABLET ORAL at 21:27

## 2023-03-04 RX ADMIN — SODIUM BICARBONATE 650 MG: 650 TABLET ORAL at 21:27

## 2023-03-04 RX ADMIN — SODIUM CHLORIDE 9.5 UNITS/HR: 9 INJECTION, SOLUTION INTRAVENOUS at 15:52

## 2023-03-04 RX ADMIN — OXYCODONE 5 MG: 5 TABLET ORAL at 16:09

## 2023-03-04 RX ADMIN — SODIUM CHLORIDE 14.6 UNITS/HR: 9 INJECTION, SOLUTION INTRAVENOUS at 22:34

## 2023-03-04 RX ADMIN — HYDRALAZINE HYDROCHLORIDE 100 MG: 50 TABLET, FILM COATED ORAL at 15:58

## 2023-03-04 RX ADMIN — HEPARIN SODIUM 5000 UNITS: 5000 INJECTION INTRAVENOUS; SUBCUTANEOUS at 21:28

## 2023-03-04 RX ADMIN — SUCRALFATE 1 G: 1 TABLET ORAL at 15:58

## 2023-03-04 RX ADMIN — OXYCODONE 5 MG: 5 TABLET ORAL at 23:06

## 2023-03-04 RX ADMIN — CARVEDILOL 12.5 MG: 12.5 TABLET, FILM COATED ORAL at 15:58

## 2023-03-04 RX ADMIN — ROSUVASTATIN CALCIUM 40 MG: 40 TABLET, FILM COATED ORAL at 21:27

## 2023-03-04 RX ADMIN — SODIUM CHLORIDE 1 G: 900 INJECTION INTRAVENOUS at 16:14

## 2023-03-04 RX ADMIN — PREGABALIN 75 MG: 50 CAPSULE ORAL at 21:27

## 2023-03-04 RX ADMIN — HYDRALAZINE HYDROCHLORIDE 100 MG: 50 TABLET, FILM COATED ORAL at 21:27

## 2023-03-04 NOTE — H&P
Hospitalist Admission Note    NAME: Jayme Green   :  1980   MRN:  [id number]     Date/Time:  3/4/2023 3:44 PM    Patient PCP: Steve Cao MD  ______________________________________________________________________  Given the patient's current clinical presentation, I have a high level of concern for decompensation if discharged from the emergency department. Complex decision making was performed, which includes reviewing the patient's available past medical records, laboratory results, and x-ray films. Subjective:   CHIEF COMPLAINT: shortness of breath. HISTORY OF PRESENT ILLNESS:     Davon Artis is a 38 y.o. with hx of type I DM, hypertension, ESRD on HD presented to ED from [facility] for hemodialysis. Patient presented to [facility] ED with c/o right back pain. Patient missed HD on Friday as he slept all day. Patient reports that he had right back pain on Thursday night and couldn't sleep and finally went to sleep on Friday, so he didn't want to go to HD, so missed HD. Patient uses 2L of oxygen on baseline. But had increased oxygen requirement upto 6L at OSH. CXR showed pulmonary edema and B/L pleural effusion. Right back pain is pleuritic in nature. Patient reports that he was told that he has urine infection at OSH. Patient is currently saturation upper 80s to lower 90s on 4L of supplemental oxygen. Labs remarkable for elevated blood glucose of 595, no anion gap. However, is acidotic with pH Of 7.2 on VBG. We were asked to admit for work up and evaluation of the above problems.      Past Medical History:   Diagnosis Date    Bipolar 1 disorder, depressed ([address]     Bipolar disorder ([facility] [address]     Chronic kidney disease, stage 3a ([address]     Depression     Diabetes ([address]     DKA, type 1 ([address] 2013    diagnosed age 23    DKA, type 1 ([address]     H/O noncompliance with medical treatment, presenting hazards to health     MRSA (methicillin resistant staph aureus) culture positive MRSA (methicillin resistant Staphylococcus aureus)     Face    Noncompliance with medication regimen     Second hand smoke exposure     Seizure ([facility]     Seizures ([facility] [address] 2006 or 2007    one episode during jail        Past Surgical History:   Procedure Laterality Date    HX HEART CATHETERIZATION  11/2022    AT [facility] AFTER WHAT PATIENT CALLED \"MILD HEART ATTACK\"    HX HEENT      top left wisdom tooth    HX ORTHOPAEDIC Left     wrist; [facility]    IR INSERT NON TUNL CVC OVER 5 YRS  09/07/2022    IR INSERT TUNL CVC W/O PORT OVER 5 YR  09/09/2022    UPPER GI ENDOSCOPY,BIOPSY  11/20/2018            Social History     Tobacco Use    Smoking status: Former     Packs/day: 0.10     Years: 16.00     Pack years: 1.60     Types: Cigarettes     Start date: 10/4/1999     Quit date: 2/25/2020     Years since quitting: 3.0    Smokeless tobacco: Never   Substance Use Topics    Alcohol use: No        Family History   Problem Relation Age of Onset    Diabetes Mother     Diabetes Other         neice, type 1    Family hx reviewed and not pertinent to patient's presentation. No Known Allergies     Prior to Admission medications    Medication Sig Start Date End Date Taking? Authorizing Provider   aspirin 81 mg chewable tablet Take 81 mg by mouth daily. 12/27/22   Provider, Historical   furosemide (LASIX) 40 mg tablet Take 1 Tablet by mouth daily. 1/27/23   Ioana Butler MD   sucralfate (CARAFATE) 1 gram tablet Take 1 Tablet by mouth Before breakfast, lunch, dinner and at bedtime for 62 days. 1/27/23 3/30/23  Ioana Butler MD   flash glucose sensor (FreeStyle Connor 14 Day Sensor) kit Administer blood sugars four times daily 1/27/23   Ioana Butler MD   insulin glargine (LANTUS,BASAGLAR) 100 unit/mL (3 mL) inpn [place] 10 units subcut daily 1/27/23   Ioana Butler MD   Insulin Syringe-Needle U-100 1 mL 28 x 5/16\" syrg 1 Syringe by SubCUTAneous route Before breakfast, lunch, dinner and at bedtime.  1/27/23   Mckinley Carmenza Morocho MD   insulin lispro (HUMALOG) 100 unit/mL kwikpen Administer 10 units before each meal 1/27/23   Brianne Parada MD   carvediloL (COREG) 12.5 mg tablet Take 1 Tablet by mouth two (2) times daily (with meals). 1/10/23   Brianne Parada MD   amLODIPine (NORVASC) 10 mg tablet Take 1 Tablet by mouth daily. 1/10/23   Brianne Parada MD   finasteride (PROSCAR) 5 mg tablet Take 1 Tablet by mouth daily. 1/10/23   Brianne Parada MD   hydrALAZINE (APRESOLINE) 100 mg tablet Take 1 Tablet by mouth three (3) times daily. 1/10/23   Brianne Parada MD   losartan (COZAAR) 50 mg tablet Take 1 Tablet by mouth daily. 1/10/23   Brianne Parada MD   pantoprazole (PROTONIX) 40 mg tablet Take 1 Tablet by mouth daily for 90 days. 1/10/23 4/10/23  Brianne Parada MD   pregabalin (Lyrica) 75 mg capsule Take 1 Capsule by mouth nightly. Max Daily Amount: 75 mg. 1/10/23   Brianne Parada MD   tamsulosin (FLOMAX) 0.4 mg capsule Take 1 Capsule by mouth daily. 1/10/23   Brianne Parada MD   rosuvastatin (CRESTOR) 40 mg tablet Take 1 Tablet by mouth nightly. 1/10/23   Brianne Parada MD   flash glucose scanning reader (FreeStyle Connor 14 Day [place]) misc Administer blood sugars four times daily 1/10/23   Brianne Parada MD   cloNIDine (CATAPRES) 0.1 mg/24 hr ptwk 1 Patch by TransDERmal route every seven (7) days. 1/10/23   Brianne Parada MD   DULoxetine (CYMBALTA) 60 mg capsule Take 1 Capsule by mouth daily. 1/10/23   Brianne Parada MD   sodium bicarbonate 650 mg tablet Take 650 mg by mouth three (3) times daily. Provider, Historical   fluconazole (DIFLUCAN) 100 mg tablet Take 100 mg by mouth daily. FDA advises cautious prescribing of oral fluconazole in pregnancy. Provider, Historical   OXYGEN-AIR DELIVERY SYSTEMS Take 2 L/min by inhalation continuous. Provider, Historical   OXYGEN-AIR DELIVERY SYSTEMS Take 2 L/min by inhalation continuous.     Provider, Historical sevelamer (RENAGEL) 800 mg tablet Take 1,600 mg by mouth three (3) times daily (with meals). Provider, Historical   lancets misc [facility] 10/28/22   Yimi Aguilar MD   Blood-Glucose Meter monitoring kit [facility] 10/28/22   Yimi Aguilar MD   glucose blood VI test strips (blood glucose test) strip [facility] 10/28/22   Yimi Aguilar MD   DULoxetine (CYMBALTA) 60 mg capsule Take 1 capsule by mouth once daily 10/6/22   Jn Holman MD   naloxone (Narcan) 4 mg/actuation nasal spray Use 1 spray intranasally, then discard. Repeat with new spray every 2 min as needed for opioid overdose symptoms, alternating nostrils. 9/13/22   Jn Holman MD   Ketone Blood Test strp 50 Each by Does Not Apply route as needed for PRN Reason (Other) (as needed for suspected dka). Patient not taking: Reported on 1/10/2023 8/23/22   Isabell Estrada MD   Satinder Skipper (Ultra-Light Rollator) misc Use while ambulating 7/14/22   Jn Holman MD   Dexcom G6  misc Use with the dexcom device to check blood sugars 4 times a day  Patient not taking: No sig reported 7/1/22   Isabell Estrada MD   Dexcom G6 Sensor brandi Use as directed every 10 days  Patient not taking: Reported on 1/10/2023 6/21/22   Isabell Estrada MD   Dexcom G6 Transmitter brandi Use as directed  Patient not taking: Reported on 1/10/2023 6/21/22   Isabell Estrada MD   Insulin Needles, Disposable, 31 gauge x 5/16\" ndle Dx code E11.65, use 6x daily 6/21/22   Isabell Estrada MD       REVIEW OF SYSTEMS:     I am not able to complete the review of systems because:    The patient is intubated and sedated    The patient has altered mental status due to his acute medical problems    The patient has baseline aphasia from prior stroke(s)    The patient has baseline dementia and is not reliable historian    The patient is in acute medical distress and unable to provide information           Total of 12 systems reviewed as follows:       POSITIVE= underlined text  Negative = text not underlined  General:  fever, chills, sweats, generalized weakness, weight loss/gain,      loss of appetite   Eyes:    blurred vision, eye pain, loss of vision, double vision  ENT:    rhinorrhea, pharyngitis   Respiratory:   cough, sputum production, SOB, [place], wheezing, pleuritic pain   Cardiology:   chest pain, palpitations, orthopnea, PND, edema, syncope   Gastrointestinal:  abdominal pain , N/V, diarrhea, dysphagia, constipation, bleeding   Genitourinary:  frequency, urgency, dysuria, hematuria, incontinence   Muskuloskeletal :  arthralgia, myalgia, back pain  Hematology:  easy bruising, nose or gum bleeding, lymphadenopathy   Dermatological: rash, ulceration, pruritis, color change / jaundice  Endocrine:   hot flashes or polydipsia   Neurological:  headache, dizziness, confusion, focal weakness, paresthesia,     Speech difficulties, memory loss, gait difficulty  Psychological: Feelings of anxiety, depression, agitation    Objective:   VITALS:    Visit Vitals  /80   Pulse 77   Temp 98 °F (36.7 °C)   Resp 21   SpO2 92%       PHYSICAL EXAM:    General:    Alert, cooperative, no distress, appears stated age. HEENT: Atraumatic, anicteric sclerae, pink conjunctivae     No oral ulcers, mucosa moist, throat clear, dentition fair  Neck:  Supple, symmetrical,  thyroid: non tender  Lungs:   Clear to auscultation bilaterally. No Wheezing or Rhonchi. No rales. Chest wall:  No tenderness  No Accessory muscle use. Heart:   Regular  rhythm,  No  murmur   No edema  Abdomen:   Soft, non-tender. Not distended. Bowel sounds normal  Extremities: No cyanosis. No clubbing,      Skin turgor normal, Capillary refill normal, Radial dial pulse 2+  Skin:     Not pale. Not Jaundiced  No rashes   Psych:  Good insight. Not depressed. Not anxious or agitated. Neurologic: EOMs intact. No facial asymmetry. No aphasia or slurred speech. Symmetrical strength, Sensation grossly intact.  Alert and oriented X 4.     _______________________________________________________________________      My assessment of this patient's clinical condition and my plan of care is as follows. Assessment / Plan:  Acute on chronic hypoxic respiratory failure  S/s fluid overload in the setting of missing HD  Pulmonary edema and B/L pleural effusion, L>right  Nephrology consulted. HD as per nephrology  Continue oxygen and wean as tolerated    Uncontrolled blood sugar  Type I DM uncontrolled  Has elevated blood sugar without anion gap. VBG shows acidosis. Will add on beta hydroxybutyrate level. Started on insulin drip in ED  Continue insulin drip  Will give lantus 10 units once blood sugar is better controlled 200-250, continue drip for 2 more hours after lantus is given. Basic metabolic panel L4A while on insulin drip    Right back Pain:  Likely s/s pleural effusion vs right pyelonephritis. Will get UA  Will get renal/bladder US    Hypertension:  Resume home medications- coreg, amlodipine, hydralazine, losartan        ESRD on HD  Nephrology consulted  HD as per nephrology    Suspected UTI  UA is pending. Patient reports he makes little urine. Reports that he needs to drink more water to pee. Will give lasix 40 mg iv now to get urine for UA. Will start ceftriaxone after UA is collected. Will get renal/bladder US to rule out obvious hydronephrosis/pyelonephritis.   Will get CT abdomen and pelvis if patient continues to have right flank/back pain after HD and fluid removal.         Code Status: full code  Surrogate Decision Maker:mother    DVT Prophylaxis: sc heparin          Care Plan discussed with:    Comments   Patient x    Family      RN     Care Manager                    Consultant:      _______________________________________________________________________  Expected  Disposition:   Home with Family x   HH/PT/OT/RN    SNF/LTC    [facility] ________________________________________________________________________    I personally spent   55  minutes in patient's care. This is time spent at  patient's bedside actively involved in patient care as well as the coordination of care and discussions with the patient's family. This does not include any procedural time which has been billed separately. ________________________________________________________________________  Signed: Crystal Orozco MD    Procedures: see electronic medical records for all procedures/Xrays and details which were not copied into this note but were reviewed prior to creation of Plan. LAB DATA REVIEWED:    Recent Results (from the past 24 hour(s))   EKG, 12 LEAD, INITIAL    Collection Time: 03/04/23  1:54 PM   Result Value Ref Range    Ventricular Rate 77 BPM    Atrial Rate 77 BPM    P-R Interval 194 ms    QRS Duration 106 ms    Q-T Interval 436 ms    QTC Calculation (Bezet) 493 ms    Calculated P Axis 42 degrees    Calculated R Axis 45 degrees    Calculated T Axis 91 degrees    Diagnosis       Normal sinus rhythm  Prolonged QT  When compared with ECG of 29-NOV-2022 20:52,  No significant change was found     CBC WITH AUTOMATED DIFF    Collection Time: 03/04/23  2:03 PM   Result Value Ref Range    WBC 7.1 4.1 - 11.1 K/uL    RBC 4.78 4.10 - 5.70 M/uL    HGB 11.9 (L) 12.1 - 17.0 g/dL    HCT 39.8 36.6 - 50.3 %    MCV 83.3 80.0 - 99.0 FL    MCH 24.9 (L) 26.0 - 34.0 PG    MCHC 29.9 (L) 30.0 - 36.5 g/dL    RDW 15.8 (H) 11.5 - 14.5 %    PLATELET 948 756 - 709 K/uL    MPV 11.6 8.9 - 12.9 FL    NRBC 0.0 0  WBC    ABSOLUTE NRBC 0.00 0.00 - 0.01 K/uL    NEUTROPHILS 87 (H) 32 - 75 %    LYMPHOCYTES 5 (L) 12 - 49 %    MONOCYTES 4 (L) 5 - 13 %    EOSINOPHILS 3 0 - 7 %    BASOPHILS 1 0 - 1 %    IMMATURE GRANULOCYTES 0 0.0 - 0.5 %    ABS. NEUTROPHILS 6.1 1.8 - 8.0 K/UL    ABS. LYMPHOCYTES 0.4 (L) 0.8 - 3.5 K/UL    ABS. MONOCYTES 0.3 0.0 - 1.0 K/UL    ABS.  EOSINOPHILS 0.2 0.0 - 0.4 K/UL    ABS. BASOPHILS 0.1 0.0 - 0.1 K/UL    ABS. IMM. GRANS. 0.0 0.00 - 0.04 K/UL    DF SMEAR SCANNED      RBC COMMENTS DEBBIE CELLS  PRESENT        RBC COMMENTS ANISOCYTOSIS  PRESENT       METABOLIC PANEL, COMPREHENSIVE    Collection Time: 03/04/23  2:03 PM   Result Value Ref Range    Sodium 126 (L) 136 - 145 mmol/L    Potassium 4.2 3.5 - 5.1 mmol/L    Chloride 95 (L) 97 - 108 mmol/L    CO2 22 21 - 32 mmol/L    Anion gap 9 5 - 15 mmol/L    Glucose 595 (H) 65 - 100 mg/dL    BUN 60 (H) 6 - 20 MG/DL    Creatinine 6.80 (H) 0.70 - 1.30 MG/DL    BUN/Creatinine ratio 9 (L) 12 - 20      eGFR 10 (L) >60 ml/min/1.73m2    Calcium 7.8 (L) 8.5 - 10.1 MG/DL    Bilirubin, total 0.5 0.2 - 1.0 MG/DL    ALT (SGPT) 16 12 - 78 U/L    AST (SGOT) 9 (L) 15 - 37 U/L    Alk.  phosphatase 156 (H) 45 - 117 U/L    Protein, total 6.4 6.4 - 8.2 g/dL    Albumin 2.6 (L) 3.5 - 5.0 g/dL    Globulin 3.8 2.0 - 4.0 g/dL    A-G Ratio 0.7 (L) 1.1 - 2.2     NT-PRO BNP    Collection Time: 03/04/23  2:03 PM   Result Value Ref Range    NT pro-BNP 32,904 (H) <125 PG/ML   TROPONIN-HIGH SENSITIVITY    Collection Time: 03/04/23  2:03 PM   Result Value Ref Range    Troponin-High Sensitivity 13 0 - 76 ng/L   BLOOD GAS,CHEM8,LACTIC ACID POC    Collection Time: 03/04/23  2:10 PM   Result Value Ref Range    pH, venous (POC) 7.20 (L) 7.32 - 7.42      pCO2, venous (POC) 53.6 (H) 41 - 51 MMHG    pO2, venous (POC) 60 (H) 25 - 40 mmHg    BICARBONATE 21 mmol/L    Base deficit (POC) 7.6 mmol/L    O2 SAT 84 %    Sodium,  (L) 136 - 145 MMOL/L    Potassium, POC 4.4 3.5 - 5.5 MMOL/L    Chloride, POC 94 (L) 100 - 108 MMOL/L    CO2, POC 21 19 - 24 MMOL/L    Anion gap, POC 11 10 - 20      Glucose,  (HH) 74 - 106 MG/DL    Creatinine, POC 7.4 (H) 0.6 - 1.3 MG/DL    eGFR (POC) 9 (L) >60 ml/min/1.73m2    Calcium, ionized (POC) 1.07 (L) 1.12 - 1.32 mmol/L    Lactic Acid (POC) 0.60 0.40 - 2.00 mmol/L    Sample source VENOUS BLOOD     GLUCOSE, POC    Collection Time: 03/04/23  3:22 PM   Result Value Ref Range    Glucose (POC) 536 (H) 65 - 117 mg/dL    Performed by Gregorio Farmer RN

## 2023-03-04 NOTE — ED NOTES
Spoke to provider in re guards to the 250  bg level needing a dextrose product added on once they are on the insulin gtt, they stated not going to add as pt is already in fluid overload dr Salvatore Klinefelter

## 2023-03-04 NOTE — CONSULTS
NEPHROLOGY CONSULT NOTE     Patient: Kortney Batista MRN: [id number]  PCP: Segun Murphy MD   :     1980  Age:   38 y.o. Sex:  male      Referring physician: Spring Concepcion MD  Reason for consultation: 38 y.o. male with Acute on chronic respiratory failure with hypoxia ([facility] [address] [S86.55] complicated by FELECIA   Admission Date: 3/4/2023  1:14 PM  LOS: 0 days     ASSESSMENT and PLAN :   ESRD on HD  - MWF as outpt  - outpt HD at [facility] w/ Dr. Martinez Malcolm  - access: LUE AVG  - HD vintage: 2022  - last HD 3/1 (missed yesterday)  - for HD today: 3.5L UF as tolerated - Twila Mosquera RN notified  - still makes urine  - given IV Lasix 40  - can be on Lasix 40mg PO BID off HD days  - renal diet  - renally dose meds    AOCD  - Hb 11.9    CKD-MBD  - check corCa, phos    Pulm edema  - CXR: L>R pulm edema  - for HD today    HTN  Uncontrolled DM  - insulin gtt       Active Problems / Assessment AAActive  : Active Problems:    Acute on chronic respiratory failure with hypoxia ([facility] [address] (3/4/2023)         Subjective:   HPI: Kortney Batista is a 38 y.o.  male who presents as a transfer from [facility] for hemodialysis. Patient presented to outside hospital with chief complaint of right flank pain, elevated blood sugar in setting of missing dialysis. Patient is  dialysis, he missed yesterday session as he was having pain and finally went to sleep and then slept all day. He reports additional symptoms of shortness of breath. He is on 2 L chronically but had to bump up to 6 L at outside hospital.  His potassium was hemolyzed at 6.2, EKG was nonischemic, troponin was negative, lab work was otherwise unremarkable. Chest x-ray revealed bilateral pleural effusions, pulmonary edema versus infectious etiology. Patient has no complaints as of right now except feeling mildly short of breath.      Nursing Notes were all reviewed and agreed with or any disagreements were addressed in the HPI.    Past Medical Hx:   Past Medical History:   Diagnosis Date    Bipolar 1 disorder, depressed ([address]     Bipolar disorder ([address]     Chronic kidney disease, stage 3a ([address]     Depression     Diabetes ([address]     DKA, type 1 ([address] 1/27/2013    diagnosed age 23    DKA, type 1 ([address]     H/O noncompliance with medical treatment, presenting hazards to health     MRSA (methicillin resistant staph aureus) culture positive     MRSA (methicillin resistant Staphylococcus aureus)     Face    Noncompliance with medication regimen     Second hand smoke exposure     Seizure ([address]     Seizures ([address] 2006 or 2007    one episode during snf        Past Surgical Hx:     Past Surgical History:   Procedure Laterality Date    HX HE[place] [facility] [place] CALLED \"MILD HEART ATTACK\"    HX HEENT      top left wisdom tooth    HX [place]EDIC [place]     [place] [facility] [place]    IR INSERT NON TUNL CVC OVER 5 YRS  09/07/2022    IR INSERT TUNL CVC W/O PORT OVER 5 YR  09/09/2022    UPPER GI ENDOSCOPY,BIOPSY  11/20/2018            Medications:  Prior to Admission medications    Medication Sig Start Date End Date Taking? Authorizing Provider   aspirin 81 mg chewable tablet Take 81 mg by mouth daily. 12/27/22   Provider, Historical   furosemide (LASIX) 40 mg tablet Take 1 Tablet by mouth daily. 1/27/23   Martinez Rob MD   sucralfate (CARAFATE) 1 gram tablet Take 1 Tablet by mouth Before breakfast, lunch, dinner and at bedtime for 62 days. 1/27/23 3/30/23  Martinez Rob MD   flash glucose sensor (FreeStyle Connor 14 Day Sensor) kit Administer blood sugars four times daily 1/27/23   Martinez Rob MD   insulin glargine (LANTUS,BASAGLAR) 100 unit/mL (3 mL) inpn [place] 10 units subcut daily 1/27/23   Martinez Rob MD   Insulin Syringe-Needle U-100 1 mL 28 x 5/16\" syrg 1 Syringe by SubCUTAneous route Before breakfast, lunch, dinner and at bedtime.  1/27/23   Martinez Rob MD   insulin lispro (HUMALOG) 100 unit/mL kwikpen Administer 10 units before each meal 1/27/23   Etienne Coles MD   carvediloL (COREG) 12.5 mg tablet Take 1 Tablet by mouth two (2) times daily (with meals). 1/10/23   Etienne Coles MD   amLODIPine (NORVASC) 10 mg tablet Take 1 Tablet by mouth daily. 1/10/23   Etienne Coles MD   finasteride (PROSCAR) 5 mg tablet Take 1 Tablet by mouth daily. 1/10/23   Etienne Coles MD   hydrALAZINE (APRESOLINE) 100 mg tablet Take 1 Tablet by mouth three (3) times daily. 1/10/23   Etienne Coles MD   losartan (COZAAR) 50 mg tablet Take 1 Tablet by mouth daily. 1/10/23   Etienne Coles MD   pantoprazole (PROTONIX) 40 mg tablet Take 1 Tablet by mouth daily for 90 days. 1/10/23 4/10/23  Etienne Coles MD   pregabalin (Lyrica) 75 mg capsule Take 1 Capsule by mouth nightly. Max Daily Amount: 75 mg. 1/10/23   Etienne Coles MD   tamsulosin (FLOMAX) 0.4 mg capsule Take 1 Capsule by mouth daily. 1/10/23   Etienne Coles MD   rosuvastatin (CRESTOR) 40 mg tablet Take 1 Tablet by mouth nightly. 1/10/23   Etienne Coles MD   flash glucose scanning reader (FreeStyle Connor 14 Day [place]) misc Administer blood sugars four times daily 1/10/23   Etienne Coles MD   cloNIDine (CATAPRES) 0.1 mg/24 hr ptwk 1 Patch by TransDERmal route every seven (7) days. 1/10/23   Etienne Coles MD   DULoxetine (CYMBALTA) 60 mg capsule Take 1 Capsule by mouth daily. 1/10/23   Etienne Coles MD   sodium bicarbonate 650 mg tablet Take 650 mg by mouth three (3) times daily. Provider, Historical   fluconazole (DIFLUCAN) 100 mg tablet Take 100 mg by mouth daily. FDA advises cautious prescribing of oral fluconazole in pregnancy. Provider, Historical   OXYGEN-AIR DELIVERY SYSTEMS Take 2 L/min by inhalation continuous. Provider, Historical   OXYGEN-AIR DELIVERY SYSTEMS Take 2 L/min by inhalation continuous.     Provider, Historical   sevelamer (RENAGEL) 800 mg tablet Take 1,600 mg by mouth three (3) times daily (with meals). Provider, Historical   lancets misc [facility] 10/28/22   Shu Briones MD   Blood-Glucose Meter monitoring kit [facility] 10/28/22   Shu Briones MD   glucose blood VI test strips (blood glucose test) strip [facility] 10/28/22   Shu Briones MD   DULoxetine (CYMBALTA) 60 mg capsule Take 1 capsule by mouth once daily 10/6/22   Анна Saeed MD   naloxone (Narcan) 4 mg/actuation nasal spray Use 1 spray intranasally, then discard. Repeat with new spray every 2 min as needed for opioid overdose symptoms, alternating nostrils. 9/13/22   Анна Saeed MD   Ketone Blood Test strp 50 Each by Does Not Apply route as needed for PRN Reason (Other) (as needed for suspected dka). Patient not taking: Reported on 1/10/2023 8/23/22   MD Marquez Willett Dicphylicia (Ultra-Light Rollator) misc Use while ambulating 7/14/22   Анна Saeed MD   Dexcom G6  misc Use with the dexcom device to check blood sugars 4 times a day  Patient not taking: No sig reported 7/1/22   Kenyatta Buckner MD   Dexcom G6 Sensor brandi Use as directed every 10 days  Patient not taking: Reported on 1/10/2023 6/21/22   Kenyatta Buckner MD   Dexcom G6 Transmitter brandi Use as directed  Patient not taking: Reported on 1/10/2023 6/21/22   Kenyatta Buckner MD   Insulin Needles, Disposable, 31 gauge x 5/16\" ndle Dx code E11.65, use 6x daily 6/21/22   Kenyatta Buckner MD       No Known Allergies    Social Hx:  reports that he quit smoking about 3 years ago. His smoking use included cigarettes. He started smoking about 23 years ago. He has a 1.60 pack-year smoking history. He has never used smokeless tobacco. He reports that he does not drink alcohol and does not use drugs. Family History   Problem Relation Age of Onset    Diabetes Mother     Diabetes Other         neice, type 1        Review of Systems:  A twelve point review of system was performed today.  Pertinent positives and negatives are mentioned in the HPI. The reminder of the ROS is negative and noncontributory. Objective:    Vitals:    Vitals:    03/04/23 1322 03/04/23 1323 03/04/23 1324 03/04/23 1325   BP:       Pulse: 77 77 77 77   Resp: 23 22 22 21   Temp:       SpO2: 91% 92% 91% 92%     I&O's:  No intake/output data recorded. Visit Vitals  /80   Pulse 77   Temp 98 °F (36.7 °C)   Resp 21   SpO2 92%       Physical Exam:  General:Alert, mild distress,   HEENT: Eyes are PERRL. Conjunctiva without pallor ,erythema. The sclerae without icterus. .   Neck:Supple,no mass palpable  Lungs : +rales  CVS: RRR, S1 S2 normal, No rub, +2 LE edema  Abdomen: Soft, Non tender, No hepatosplenomegaly, bowel sounds present  Extremities: No cyanosis, No clubbing  Skin: No rash or lesions. Lymph nodes: No palpable nodes  MS: No joint swelling, erythema, warmth  Neurologic: non focal, AAO x 3  Psych: normal affect  Access: LUE AVG good thrill/bruit    Laboratory Results:  Recent Labs     03/04/23  1537 03/04/23  1403   * 126*   K 4.6 4.2   CL 96* 95*   CO2 21 22   * 595*   BUN 63* 60*   CREA 6.88* 6.80*   CA 8.2* 7.8*   MG 2.7*  --    PHOS 7.8*  --    ALB  --  2.6*   ALT  --  16     Recent Labs     03/04/23  1403   WBC 7.1   HGB 11.9*   HCT 39.8        Lab Results   Component Value Date/Time    Specimen Description: NASAL 04/30/2014 03:30 AM    Specimen Description: NARES 01/28/2014 08:30 PM    Specimen Description: NARES 07/04/2013 11:00 PM     Lab Results   Component Value Date/Time    Culture result: MRSA PRESENT (A) 12/21/2022 03:44 PM    Culture result:  12/21/2022 03:44 PM     Screening of patient nares for MRSA is for surveillance purposes and, if positive, to facilitate isolation considerations in high risk settings. It is not intended for automatic decolonization interventions per se as regimens are not sufficiently effective to warrant routine use.       Culture result:  12/21/2022 03:44 PM (NOTE) MRSA PRESENT CALLED TO READ BACK UNABLE TO CONTACT PROVIDER DPAT LOCATION CLOSED AFTER HOURS  FOLLOWUP  CB1 FOR  WITH RESULT CLIENT SERVICE [facility] LAB IN AM WITH RESULT TO PROVIDER. Recent Results (from the past 24 hour(s))   EKG, 12 LEAD, INITIAL    Collection Time: 03/04/23  1:54 PM   Result Value Ref Range    Ventricular Rate 77 BPM    Atrial Rate 77 BPM    P-R Interval 194 ms    QRS Duration 106 ms    Q-T Interval 436 ms    QTC Calculation (Bezet) 493 ms    Calculated P Axis 42 degrees    Calculated R Axis 45 degrees    Calculated T Axis 91 degrees    Diagnosis       Normal sinus rhythm  Prolonged QT  When compared with ECG of 29-NOV-2022 20:52,  No significant change was found     CBC WITH AUTOMATED DIFF    Collection Time: 03/04/23  2:03 PM   Result Value Ref Range    WBC 7.1 4.1 - 11.1 K/uL    RBC 4.78 4.10 - 5.70 M/uL    HGB 11.9 (L) 12.1 - 17.0 g/dL    HCT 39.8 36.6 - 50.3 %    MCV 83.3 80.0 - 99.0 FL    MCH 24.9 (L) 26.0 - 34.0 PG    MCHC 29.9 (L) 30.0 - 36.5 g/dL    RDW 15.8 (H) 11.5 - 14.5 %    PLATELET 047 290 - 821 K/uL    MPV 11.6 8.9 - 12.9 FL    NRBC 0.0 0  WBC    ABSOLUTE NRBC 0.00 0.00 - 0.01 K/uL    NEUTROPHILS 87 (H) 32 - 75 %    LYMPHOCYTES 5 (L) 12 - 49 %    MONOCYTES 4 (L) 5 - 13 %    EOSINOPHILS 3 0 - 7 %    BASOPHILS 1 0 - 1 %    IMMATURE GRANULOCYTES 0 0.0 - 0.5 %    ABS. NEUTROPHILS 6.1 1.8 - 8.0 K/UL    ABS. LYMPHOCYTES 0.4 (L) 0.8 - 3.5 K/UL    ABS. MONOCYTES 0.3 0.0 - 1.0 K/UL    ABS. EOSINOPHILS 0.2 0.0 - 0.4 K/UL    ABS. BASOPHILS 0.1 0.0 - 0.1 K/UL    ABS. IMM.  GRANS. 0.0 0.00 - 0.04 K/UL    DF SMEAR SCANNED      RBC COMMENTS DEBBIE CELLS  PRESENT        RBC COMMENTS ANISOCYTOSIS  PRESENT       METABOLIC PANEL, COMPREHENSIVE    Collection Time: 03/04/23  2:03 PM   Result Value Ref Range    Sodium 126 (L) 136 - 145 mmol/L    Potassium 4.2 3.5 - 5.1 mmol/L    Chloride 95 (L) 97 - 108 mmol/L    CO2 22 21 - 32 mmol/L    Anion gap 9 5 - 15 mmol/L    Glucose 595 (H) 65 - 100 mg/dL    BUN 60 (H) 6 - 20 MG/DL    Creatinine 6.80 (H) 0.70 - 1.30 MG/DL    BUN/Creatinine ratio 9 (L) 12 - 20      eGFR 10 (L) >60 ml/min/1.73m2    Calcium 7.8 (L) 8.5 - 10.1 MG/DL    Bilirubin, total 0.5 0.2 - 1.0 MG/DL    ALT (SGPT) 16 12 - 78 U/L    AST (SGOT) 9 (L) 15 - 37 U/L    Alk.  phosphatase 156 (H) 45 - 117 U/L    Protein, total 6.4 6.4 - 8.2 g/dL    Albumin 2.6 (L) 3.5 - 5.0 g/dL    Globulin 3.8 2.0 - 4.0 g/dL    A-G Ratio 0.7 (L) 1.1 - 2.2     NT-PRO BNP    Collection Time: 03/04/23  2:03 PM   Result Value Ref Range    NT pro-BNP 32,904 (H) <125 PG/ML   TROPONIN-HIGH SENSITIVITY    Collection Time: 03/04/23  2:03 PM   Result Value Ref Range    Troponin-High Sensitivity 13 0 - 76 ng/L   BLOOD GAS,CHEM8,LACTIC ACID POC    Collection Time: 03/04/23  2:10 PM   Result Value Ref Range    pH, venous (POC) 7.20 (L) 7.32 - 7.42      pCO2, venous (POC) 53.6 (H) 41 - 51 MMHG    pO2, venous (POC) 60 (H) 25 - 40 mmHg    BICARBONATE 21 mmol/L    Base deficit (POC) 7.6 mmol/L    O2 SAT 84 %    Sodium,  (L) 136 - 145 MMOL/L    Potassium, POC 4.4 3.5 - 5.5 MMOL/L    Chloride, POC 94 (L) 100 - 108 MMOL/L    CO2, POC 21 19 - 24 MMOL/L    Anion gap, POC 11 10 - 20      Glucose,  (HH) 74 - 106 MG/DL    Creatinine, POC 7.4 (H) 0.6 - 1.3 MG/DL    eGFR (POC) 9 (L) >60 ml/min/1.73m2    Calcium, ionized (POC) 1.07 (L) 1.12 - 1.32 mmol/L    Lactic Acid (POC) 0.60 0.40 - 2.00 mmol/L    Sample source VENOUS BLOOD     GLUCOSE, POC    Collection Time: 03/04/23  3:22 PM   Result Value Ref Range    Glucose (POC) 536 (H) 65 - 117 mg/dL    Performed by Afua Swenson RN    METABOLIC PANEL, BASIC    Collection Time: 03/04/23  3:37 PM   Result Value Ref Range    Sodium 128 (L) 136 - 145 mmol/L    Potassium 4.6 3.5 - 5.1 mmol/L    Chloride 96 (L) 97 - 108 mmol/L    CO2 21 21 - 32 mmol/L    Anion gap 11 5 - 15 mmol/L    Glucose 617 (HH) 65 - 100 mg/dL    BUN 63 (H) 6 - 20 MG/DL    Creatinine 6.88 (H) 0.70 - 1.30 MG/DL BUN/Creatinine ratio 9 (L) 12 - 20      eGFR 10 (L) >60 ml/min/1.73m2    Calcium 8.2 (L) 8.5 - 10.1 MG/DL   MAGNESIUM    Collection Time: 03/04/23  3:37 PM   Result Value Ref Range    Magnesium 2.7 (H) 1.6 - 2.4 mg/dL   PHOSPHORUS    Collection Time: 03/04/23  3:37 PM   Result Value Ref Range    Phosphorus 7.8 (H) 2.6 - 4.7 MG/DL   URINALYSIS W/ REFLEX CULTURE    Collection Time: 03/04/23  4:08 PM    Specimen: Urine   Result Value Ref Range    Color YELLOW/STRAW      Appearance CLEAR CLEAR      Specific gravity 1.023      pH (UA) 5.5 5.0 - 8.0      Protein >300 (A) NEG mg/dL    Glucose >1,000 (A) NEG mg/dL    Ketone TRACE (A) NEG mg/dL    Bilirubin Negative NEG      Blood SMALL (A) NEG      Urobilinogen 0.2 0.2 - 1.0 EU/dL    Nitrites Negative NEG      Leukocyte Esterase Negative NEG      WBC 0-4 0 - 4 /hpf    RBC 0-5 0 - 5 /hpf    Epithelial cells MODERATE (A) FEW /lpf    Bacteria Negative NEG /hpf    UA:UC IF INDICATED CULTURE NOT INDICATED BY UA RESULT CNI     GLUCOSTABILIZER    Collection Time: 03/04/23  4:15 PM   Result Value Ref Range    Glucose 536 mg/dL    Insulin order 9.5 units/hour    Insulin adminstered 9.5 units/hour    Multiplier 0.020     Low target 150 mg/dL    High target 200 mg/dL    D50 order 0.0 ml    D50 administered 0.00 ml    Minutes until next BG 60 min    Order initials jl     Administered initials jl        Urine dipstick:   Lab Results   Component Value Date/Time    Color YELLOW/STRAW 03/04/2023 04:08 PM    Appearance CLEAR 03/04/2023 04:08 PM    Specific gravity 1.023 03/04/2023 04:08 PM    Specific gravity 1.020 05/08/2022 09:06 PM    pH (UA) 5.5 03/04/2023 04:08 PM    Protein >300 (A) 03/04/2023 04:08 PM    Glucose >1,000 (A) 03/04/2023 04:08 PM    Ketone TRACE (A) 03/04/2023 04:08 PM    Bilirubin Negative 03/04/2023 04:08 PM    Urobilinogen 0.2 03/04/2023 04:08 PM    Nitrites Negative 03/04/2023 04:08 PM    Leukocyte Esterase Negative 03/04/2023 04:08 PM    Epithelial cells MODERATE (A) 03/04/2023 04:08 PM    Bacteria Negative 03/04/2023 04:08 PM    WBC 0-4 03/04/2023 04:08 PM    RBC 0-5 03/04/2023 04:08 PM       I have reviewed the following: All pertinent labs, microbiology data, radiology imaging for my assessment     ECG- Rev: Yes / No  Xray/CT/US/MRI REV: Yes/ No    Care Plan discussed with:  ER Dr. Micheal Lucero reviewed. Total time spent with patient:  20mins  Medications list Personally Reviewed   [x]      Yes     []               No      Thank you for allowing us to participate in the care of this patient. We will follow patient. Please dont hesitate to call with any questions    Archana Katz MD  3/4/2023    [facility] [address] [place] [address] [facility]  Phone - [phone]   Fax - [phone]  [email or web address]

## 2023-03-04 NOTE — ED PROVIDER NOTES
[id number]       Pt Name: Axel Lynn  MRN: [id number]  [place] 1980  Date of evaluation: 3/4/2023  Provider: Neva Ham DO   PCP: Kia Kan MD  Note Started: 2:09 PM 3/4/23     CHIEF COMPLAINT       Chief Complaint   Patient presents with    Flank Pain     Vcu tx for flank pain and bilateral Pleural effusions         HISTORY OF PRESENT ILLNESS: 1 or more elements      History From: Patient  HPI Limitations : None     Axel Lynn is a 38 y.o. male who presents as a transfer from [facility] for hemodialysis. Patient presented to outside hospital with chief complaint of right flank pain, elevated blood sugar in setting of missing dialysis. Patient is Monday Wednesday Friday dialysis, he missed yesterday session as he was having pain and finally went to sleep and then slept all day. He reports additional symptoms of shortness of breath. He is on 2 L chronically but had to bumped up to 6 L at outside hospital.  His potassium was hemolyzed at 6.2, EKG was nonischemic, troponin was negative, lab work was otherwise unremarkable. Chest x-ray revealed bilateral pleural effusions, pulmonary edema versus infectious etiology. Patient has no complaints as of right now except feeling mildly short of breath. Nursing Notes were all reviewed and agreed with or any disagreements were addressed in the HPI. REVIEW OF SYSTEMS      Review of Systems   Constitutional:  Negative for chills. Respiratory:  Positive for shortness of breath. Cardiovascular:  Negative for chest pain. Gastrointestinal:  Negative for diarrhea, nausea and vomiting. Neurological:  Negative for weakness, numbness and headaches. Positives and Pertinent negatives as per HPI.     PAST HISTORY     Past Medical History:  Past Medical History:   Diagnosis Date    Bipolar 1 disorder, depressed ([facility] [address]     Bipolar disorder ([facility] [address]     Chronic kidney disease, stage 3a ([facility] [address]     Depression     Diabetes ([facility] [address]     DKA, type 1 ([facility] [address] 1/27/2013    diagnosed age 23    DKA, type 1 ([facility] [address]     H/O noncompliance with medical treatment, presenting hazards to health     MRSA (methicillin resistant staph aureus) culture positive     MRSA (methicillin resistant Staphylococcus aureus)     Face    Noncompliance with medication regimen     Second hand smoke exposure     Seizure ([facility] [address]     Seizures ([facility] [address] 2006 or 2007    one episode during jail       Past Surgical History:  Past Surgical History:   Procedure Laterality Date    [place] [facility] [place]ED \"MILD HEART ATTACK\"    HX HEENT      top left wisdom tooth    [place] [facility] [place] INSERT NON TUNL CVC OVER 5 YRS  09/07/2022    IR INSERT TUNL CVC W/O PORT OVER 5 YR  09/09/2022    UPPER GI ENDOSCOPY,BIOPSY  11/20/2018            Family History:  Family History   Problem Relation Age of Onset    Diabetes Mother     Diabetes Other         neice, type 1        Social History:  Social History     Tobacco Use    Smoking status: Former     Packs/day: 0.10     Years: 16.00     Pack years: 1.60     Types: Cigarettes     Start date: 10/4/1999     Quit date: 2/25/2020     Years since quitting: 3.0    Smokeless tobacco: Never   Vaping Use    Vaping Use: Never used   Substance Use Topics    Alcohol use: No    Drug use: No       Allergies:  No Known Allergies    CURRENT MEDICATIONS      Current Discharge Medication List        CONTINUE these medications which have NOT CHANGED    Details   aspirin 81 mg chewable tablet Take 81 mg by mouth daily. furosemide (LASIX) 40 mg tablet Take 1 Tablet by mouth daily. Qty: 30 Tablet, Refills: 2      sucralfate (CARAFATE) 1 gram tablet Take 1 Tablet by mouth Before breakfast, lunch, dinner and at bedtime for 62 days.   Qty: 120 Tablet, Refills: 3    Associated Diagnoses: Gastric ulcer without hemorrhage or perforation, unspecified chronicity      flash glucose sensor (FreeStyle Connor 14 Day Sensor) kit Administer blood sugars four times daily  Qty: 2 Kit, Refills: 5    Associated Diagnoses: DM type 2, goal HbA1c < 7% ([facility])      insulin glargine (LANTUS,BASAGLAR) 100 unit/mL (3 mL) inpn [place] 10 units subcut daily  Qty: 5 Pen, Refills: 3    Associated Diagnoses: DM type 2, goal HbA1c < 7% ([facility])      Insulin Syringe-Needle U-100 1 mL 28 x 5/16\" syrg 1 Syringe by SubCUTAneous route Before breakfast, lunch, dinner and at bedtime. Qty: 200 Each, Refills: 2      insulin lispro (HUMALOG) 100 unit/mL kwikpen Administer 10 units before each meal  Qty: 5 Pen, Refills: 5    Associated Diagnoses: DM type 2, goal HbA1c < 7% ([facility])      carvediloL (COREG) 12.5 mg tablet Take 1 Tablet by mouth two (2) times daily (with meals). Qty: 180 Tablet, Refills: 1    Associated Diagnoses: Primary hypertension      amLODIPine (NORVASC) 10 mg tablet Take 1 Tablet by mouth daily. Qty: 90 Tablet, Refills: 1    Associated Diagnoses: Primary hypertension      finasteride (PROSCAR) 5 mg tablet Take 1 Tablet by mouth daily. Qty: 90 Tablet, Refills: 1      hydrALAZINE (APRESOLINE) 100 mg tablet Take 1 Tablet by mouth three (3) times daily. Qty: 270 Tablet, Refills: 1    Associated Diagnoses: Primary hypertension      losartan (COZAAR) 50 mg tablet Take 1 Tablet by mouth daily. Qty: 90 Tablet, Refills: 1    Associated Diagnoses: Primary hypertension      pantoprazole (PROTONIX) 40 mg tablet Take 1 Tablet by mouth daily for 90 days. Qty: 90 Tablet, Refills: 2    Associated Diagnoses: Gastric ulcer without hemorrhage or perforation, unspecified chronicity      pregabalin (Lyrica) 75 mg capsule Take 1 Capsule by mouth nightly. Max Daily Amount: 75 mg. Qty: 90 Capsule, Refills: 1    Associated Diagnoses: Recurrent falls; Other diabetic neurological complication associated with type 2 diabetes mellitus (HCC)      tamsulosin (FLOMAX) 0.4 mg capsule Take 1 Capsule by mouth daily.   Qty: 90 Capsule, Refills: 1    Associated Diagnoses: Benign prostatic hyperplasia with urinary frequency      rosuvastatin (CRESTOR) 40 mg tablet Take 1 Tablet by mouth nightly. Qty: 90 Tablet, Refills: 1    Associated Diagnoses: Hypercholesteremia      flash glucose scanning reader (FreeStyle Connor 14 Day [place]) misc Administer blood sugars four times daily  Qty: 1 Each, Refills: 0    Associated Diagnoses: DM type 2, goal HbA1c < 7% ([facility])      cloNIDine (CATAPRES) 0.1 mg/24 hr ptwk 1 Patch by TransDERmal route every seven (7) days. Qty: 4 Patch, Refills: 1    Associated Diagnoses: Primary hypertension      !! DULoxetine (CYMBALTA) 60 mg capsule Take 1 Capsule by mouth daily. Qty: 90 Capsule, Refills: 1    Associated Diagnoses: Other diabetic neurological complication associated with type 2 diabetes mellitus (HCC)      sodium bicarbonate 650 mg tablet Take 650 mg by mouth three (3) times daily. fluconazole (DIFLUCAN) 100 mg tablet Take 100 mg by mouth daily. FDA advises cautious prescribing of oral fluconazole in pregnancy. !! OXYGEN-AIR DELIVERY SYSTEMS Take 2 L/min by inhalation continuous. !! OXYGEN-AIR DELIVERY SYSTEMS Take 2 L/min by inhalation continuous. sevelamer (RENAGEL) 800 mg tablet Take 1,600 mg by mouth three (3) times daily (with meals). lancets misc ACHS  Qty: 1 Each, Refills: 11      Blood-Glucose Meter monitoring kit ACHS  Qty: 1 Kit, Refills: 0      glucose blood VI test strips (blood glucose test) strip ACHS  Qty: 200 Strip, Refills: 0      !! DULoxetine (CYMBALTA) 60 mg capsule Take 1 capsule by mouth once daily  Qty: 30 Capsule, Refills: 0    Associated Diagnoses: Other diabetic neurological complication associated with type 1 diabetes mellitus (HCC)      naloxone (Narcan) 4 mg/actuation nasal spray Use 1 spray intranasally, then discard. Repeat with new spray every 2 min as needed for opioid overdose symptoms, alternating nostrils.   Qty: 1 Each, Refills: 0    Associated Diagnoses: Chronic midline low back pain without sciatica      Ketone Blood Test strp 50 Each by Does Not Apply route as needed for PRN Reason (Other) (as needed for suspected dka). Qty: 50 Strip, Refills: 3      Walker (Ultra-Light Rollator) misc Use while ambulating  Qty: 1 Each, Refills: 0    Associated Diagnoses: DM type 2, goal HbA1c < 7% ([address] Recurrent falls      Dexcom G6  misc Use with the dexcom device to check blood sugars 4 times a day  Qty: 1 Each, Refills: 0    Comments: [phone] dx code E10.65      Dexcom G6 Sensor brandi Use as directed every 10 days  Qty: 10 Each, Refills: 11    Comments: [phone] dx code E10.65      Dexcom G6 Transmitter brandi Use as directed  Qty: 10 Each, Refills: 3    Comments: [phone] dx code E10.65      Insulin Needles, Disposable, 31 gauge x 5/16\" ndle Dx code E11.65, use 6x daily  Qty: 200 Each, Refills: 11       !! - Potential duplicate medications found. Please discuss with provider. SCREENINGS               No data recorded         PHYSICAL EXAM      ED Triage Vitals [03/04/23 1319]   ED Encounter Vitals Group      /80      Pulse (Heart Rate) 77      Resp Rate 20      Temp 98 °F (36.7 °C)      Temp src       O2 Sat (%) 90 %      Weight       Height         Physical Exam  Vitals and nursing note reviewed. Constitutional:       Appearance: Normal appearance. He is ill-appearing. HENT:      Head: Normocephalic and atraumatic. Mouth/Throat:      Mouth: Mucous membranes are moist.   Eyes:      Conjunctiva/sclera: Conjunctivae normal.   Cardiovascular:      Rate and Rhythm: Normal rate and regular rhythm. Pulmonary:      Effort: Pulmonary effort is normal.      Breath sounds: Rales present. Abdominal:      General: Abdomen is flat. There is no distension. Tenderness: There is no abdominal tenderness. Skin:     General: Skin is warm and dry. Neurological:      Mental Status: He is alert. Mental status is at baseline. DIAGNOSTIC RESULTS   LABS:     Recent Results (from the past 12 hour(s))   EKG, 12 LEAD, INITIAL    Collection Time: 03/04/23  1:54 PM   Result Value Ref Range    Ventricular Rate 77 BPM    Atrial Rate 77 BPM    P-R Interval 194 ms    QRS Duration 106 ms    Q-T Interval 436 ms    QTC Calculation (Bezet) 493 ms    Calculated P Axis 42 degrees    Calculated R Axis 45 degrees    Calculated T Axis 91 degrees    Diagnosis       Normal sinus rhythm  Prolonged QT  When compared with ECG of 29-NOV-2022 20:52,  No significant change was found     CBC WITH AUTOMATED DIFF    Collection Time: 03/04/23  2:03 PM   Result Value Ref Range    WBC 7.1 4.1 - 11.1 K/uL    RBC 4.78 4.10 - 5.70 M/uL    HGB 11.9 (L) 12.1 - 17.0 g/dL    HCT 39.8 36.6 - 50.3 %    MCV 83.3 80.0 - 99.0 FL    MCH 24.9 (L) 26.0 - 34.0 PG    MCHC 29.9 (L) 30.0 - 36.5 g/dL    RDW 15.8 (H) 11.5 - 14.5 %    PLATELET 819 617 - 710 K/uL    MPV 11.6 8.9 - 12.9 FL    NRBC 0.0 0  WBC    ABSOLUTE NRBC 0.00 0.00 - 0.01 K/uL    NEUTROPHILS 87 (H) 32 - 75 %    LYMPHOCYTES 5 (L) 12 - 49 %    MONOCYTES 4 (L) 5 - 13 %    EOSINOPHILS 3 0 - 7 %    BASOPHILS 1 0 - 1 %    IMMATURE GRANULOCYTES 0 0.0 - 0.5 %    ABS. NEUTROPHILS 6.1 1.8 - 8.0 K/UL    ABS. LYMPHOCYTES 0.4 (L) 0.8 - 3.5 K/UL    ABS. MONOCYTES 0.3 0.0 - 1.0 K/UL    ABS. EOSINOPHILS 0.2 0.0 - 0.4 K/UL    ABS. BASOPHILS 0.1 0.0 - 0.1 K/UL    ABS. IMM.  GRANS. 0.0 0.00 - 0.04 K/UL    DF SMEAR SCANNED      RBC COMMENTS DEBBIE CELLS  PRESENT        RBC COMMENTS ANISOCYTOSIS  PRESENT       METABOLIC PANEL, COMPREHENSIVE    Collection Time: 03/04/23  2:03 PM   Result Value Ref Range    Sodium 126 (L) 136 - 145 mmol/L    Potassium 4.2 3.5 - 5.1 mmol/L    Chloride 95 (L) 97 - 108 mmol/L    CO2 22 21 - 32 mmol/L    Anion gap 9 5 - 15 mmol/L    Glucose 595 (H) 65 - 100 mg/dL    BUN 60 (H) 6 - 20 MG/DL    Creatinine 6.80 (H) 0.70 - 1.30 MG/DL    BUN/Creatinine ratio 9 (L) 12 - 20      eGFR 10 (L) >60 ml/min/1.73m2    Calcium 7.8 (L) 8.5 - 10.1 MG/DL    Bilirubin, total 0.5 0.2 - 1.0 MG/DL    ALT (SGPT) 16 12 - 78 U/L    AST (SGOT) 9 (L) 15 - 37 U/L    Alk.  phosphatase 156 (H) 45 - 117 U/L    Protein, total 6.4 6.4 - 8.2 g/dL    Albumin 2.6 (L) 3.5 - 5.0 g/dL    Globulin 3.8 2.0 - 4.0 g/dL    A-G Ratio 0.7 (L) 1.1 - 2.2     NT-PRO BNP    Collection Time: 03/04/23  2:03 PM   Result Value Ref Range    NT pro-BNP 32,904 (H) <125 PG/ML   TROPONIN-HIGH SENSITIVITY    Collection Time: 03/04/23  2:03 PM   Result Value Ref Range    Troponin-High Sensitivity 13 0 - 76 ng/L   BLOOD GAS,CHEM8,LACTIC ACID POC    Collection Time: 03/04/23  2:10 PM   Result Value Ref Range    pH, venous (POC) 7.20 (L) 7.32 - 7.42      pCO2, venous (POC) 53.6 (H) 41 - 51 MMHG    pO2, venous (POC) 60 (H) 25 - 40 mmHg    BICARBONATE 21 mmol/L    Base deficit (POC) 7.6 mmol/L    O2 SAT 84 %    Sodium,  (L) 136 - 145 MMOL/L    Potassium, POC 4.4 3.5 - 5.5 MMOL/L    Chloride, POC 94 (L) 100 - 108 MMOL/L    CO2, POC 21 19 - 24 MMOL/L    Anion gap, POC 11 10 - 20      Glucose,  (HH) 74 - 106 MG/DL    Creatinine, POC 7.4 (H) 0.6 - 1.3 MG/DL    eGFR (POC) 9 (L) >60 ml/min/1.73m2    Calcium, ionized (POC) 1.07 (L) 1.12 - 1.32 mmol/L    Lactic Acid (POC) 0.60 0.40 - 2.00 mmol/L    Sample source VENOUS BLOOD     GLUCOSE, POC    Collection Time: 03/04/23  3:22 PM   Result Value Ref Range    Glucose (POC) 536 (H) 65 - 117 mg/dL    Performed by Stone Donaldson RN    METABOLIC PANEL, BASIC    Collection Time: 03/04/23  3:37 PM   Result Value Ref Range    Sodium 128 (L) 136 - 145 mmol/L    Potassium 4.6 3.5 - 5.1 mmol/L    Chloride 96 (L) 97 - 108 mmol/L    CO2 21 21 - 32 mmol/L    Anion gap 11 5 - 15 mmol/L    Glucose 617 (HH) 65 - 100 mg/dL    BUN 63 (H) 6 - 20 MG/DL    Creatinine 6.88 (H) 0.70 - 1.30 MG/DL    BUN/Creatinine ratio 9 (L) 12 - 20      eGFR 10 (L) >60 ml/min/1.73m2    Calcium 8.2 (L) 8.5 - 10.1 MG/DL   MAGNESIUM    Collection Time: 03/04/23  3:37 PM Result Value Ref Range    Magnesium 2.7 (H) 1.6 - 2.4 mg/dL   PHOSPHORUS    Collection Time: 03/04/23  3:37 PM   Result Value Ref Range    Phosphorus 7.8 (H) 2.6 - 4.7 MG/DL   HEMOGLOBIN A1C WITH EAG    Collection Time: 03/04/23  3:37 PM   Result Value Ref Range    Hemoglobin A1c 12.3 (H) 4.0 - 5.6 %    Est. average glucose 306 mg/dL   BETA-HYDROXYBUTYRATE    Collection Time: 03/04/23  3:37 PM   Result Value Ref Range    B-hydroxybutyrate 1.26 (H) <0.40 mmol/L   URINALYSIS W/ REFLEX CULTURE    Collection Time: 03/04/23  4:08 PM    Specimen: Urine   Result Value Ref Range    Color YELLOW/STRAW      Appearance CLEAR CLEAR      Specific gravity 1.023      pH (UA) 5.5 5.0 - 8.0      Protein >300 (A) NEG mg/dL    Glucose >1,000 (A) NEG mg/dL    Ketone TRACE (A) NEG mg/dL    Bilirubin Negative NEG      Blood SMALL (A) NEG      Urobilinogen 0.2 0.2 - 1.0 EU/dL    Nitrites Negative NEG      Leukocyte Esterase Negative NEG      WBC 0-4 0 - 4 /hpf    RBC 0-5 0 - 5 /hpf    Epithelial cells MODERATE (A) FEW /lpf    Bacteria Negative NEG /hpf    UA:UC IF INDICATED CULTURE NOT INDICATED BY UA RESULT CNI     GLUCOSTABILIZER    Collection Time: 03/04/23  4:15 PM   Result Value Ref Range    Glucose 536 mg/dL    Insulin order 9.5 units/hour    Insulin adminstered 9.5 units/hour    Multiplier 0.020     Low target 150 mg/dL    High target 200 mg/dL    D50 order 0.0 ml    D50 administered 0.00 ml    Minutes until next BG 60 min    Order initials jl     Administered initials jl    GLUCOSE, POC    Collection Time: 03/04/23  5:14 PM   Result Value Ref Range    Glucose (POC) 492 (H) 65 - 117 mg/dL    Performed by Marj Rodríguez RN    GLUCOSTABILIZER    Collection Time: 03/04/23  5:15 PM   Result Value Ref Range    Glucose 492 mg/dL    Insulin order 13.0 units/hour    Insulin adminstered 13.0 units/hour    Multiplier 0.030     Low target 150 mg/dL    High target 200 mg/dL    D50 order 0.0 ml    D50 administered 0.00 ml    Minutes until next BG 60 min    Order initials AM     Administered initials AM    GLUCOSE, POC    Collection Time: 03/04/23  6:24 PM   Result Value Ref Range    Glucose (POC) 436 (H) 65 - 117 mg/dL    Performed by Demar Fraire RN    GLUCOSTABILIZER    Collection Time: 03/04/23  6:25 PM   Result Value Ref Range    Glucose 436 mg/dL    Insulin order 15.0 units/hour    Insulin adminstered 15.0 units/hour    Multiplier 0.040     Low target 150 mg/dL    High target 200 mg/dL    D50 order 0.0 ml    D50 administered 0.00 ml    Minutes until next BG 60 min    Order initials AM     Administered initials AM    GLUCOSE, POC    Collection Time: 03/04/23  7:24 PM   Result Value Ref Range    Glucose (POC) 391 (H) 65 - 117 mg/dL    Performed by Maurizio Liang RN    GLUCOSTABILIZER    Collection Time: 03/04/23  7:29 PM   Result Value Ref Range    Glucose 391 mg/dL    Insulin order 16.6 units/hour    Insulin adminstered 16.6 units/hour    Multiplier 0.050     Low target 150 mg/dL    High target 200 mg/dL    D50 order 0.0 ml    D50 administered 0.00 ml    Minutes until next BG 60 min    Order initials JRT     Administered initials JRT    METABOLIC PANEL, BASIC    Collection Time: 03/04/23  8:21 PM   Result Value Ref Range    Sodium 128 (L) 136 - 145 mmol/L    Potassium 3.9 3.5 - 5.1 mmol/L    Chloride 95 (L) 97 - 108 mmol/L    CO2 22 21 - 32 mmol/L    Anion gap 11 5 - 15 mmol/L    Glucose 377 (H) 65 - 100 mg/dL    BUN 68 (H) 6 - 20 MG/DL    Creatinine 7.16 (H) 0.70 - 1.30 MG/DL    BUN/Creatinine ratio 9 (L) 12 - 20      eGFR 9 (L) >60 ml/min/1.73m2    Calcium 8.1 (L) 8.5 - 10.1 MG/DL   MAGNESIUM    Collection Time: 03/04/23  8:21 PM   Result Value Ref Range    Magnesium 2.6 (H) 1.6 - 2.4 mg/dL   GLUCOSE, POC    Collection Time: 03/04/23  8:28 PM   Result Value Ref Range    Glucose (POC) 353 (H) 65 - 117 mg/dL    Performed by Maurizio Liang RN    GLUCOSE, POC    Collection Time: 03/04/23  8:50 PM   Result Value Ref Range    Glucose (POC) 322 (H) 65 - 117 mg/dL    Performed by Carisa Dominguez RN    GLUCOSTABILIZER    Collection Time: 03/04/23  8:52 PM   Result Value Ref Range    Glucose 322 mg/dL    Insulin order 15.7 units/hour    Insulin adminstered 15.7 units/hour    Multiplier 0.060     Low target 150 mg/dL    High target 200 mg/dL    D50 order 0.0 ml    D50 administered 0.00 ml    Minutes until next BG 60 min    Order initials JRT     Administered initials JRT    GLUCOSTABILIZER    Collection Time: 03/04/23  9:55 PM   Result Value Ref Range    Glucose 269 mg/dL    Insulin order 14.6 units/hour    Insulin adminstered 14.6 units/hour    Multiplier 0.070     Low target 150 mg/dL    High target 200 mg/dL    D50 order 0.0 ml    D50 administered 0.00 ml    Minutes until next BG 60 min    Order initials JRT     Administered initials JRT         EKG interpreted by me: Normal sinus rhythm no peaked T waves     RADIOLOGY:  Non-plain film images such as CT, Ultrasound and MRI are read by the radiologist. [facility] are visualized and preliminarily interpreted by the ED Provider with the below findings:     xray interpreted by me with bilateral pleural effusions     Interpretation per the Radiologist below, if available at the time of this note:     US RETROPERITONEUM LTD    Result Date: 3/4/2023  INDICATION: right flank pain EXAM: US Renal/Retroperitoneum FINDINGS: Renal sonogram shows normal renal contours, cortical thickness, and cortical echogenicity. There is no sonographically apparent solid mass or stone. There is no apparent cyst. Renal lengths: Right  13.5 cm Left    12.6 cm There is no hydronephrosis or caliectasis. The proximal ureters are not dilated. There is no perirenal fluid collection. The bladder is empty. Aorta and common iliac artery origins show no apparent aneurysm. IVC is unremarkable.      Unremarkable Renal Sonogram.     XR CHEST PORT    Result Date: 3/4/2023  INDICATION: vol overload  EXAM:  AP CHEST RADIOGRAPH COMPARISON: 12/3/2022 FINDINGS: AP portable view of the chest demonstrates interval removal of right IJ permacath. Heart size is likely normal, though partly obscured by bibasilar airspace disease and moderate left/small right pleural effusions. Mild pulmonary edema. No pneumothorax. The osseous structures are unremarkable. Pulmonary edema with moderate left and small right pleural effusions, and associated bibasilar airspace disease. PROCEDURES   Unless otherwise noted below, none  Procedures     CRITICAL CARE TIME   I have spent 42 minutes of critical care time in evaluating and treating this patient. This includes time spent at bedside, time with family and decision makers, documentation, review of labs and imaging, and/or consultation with specialists. It does not include time spent on separately billed procedures. This patient presents with a critical illness or injury that acutely impairs one or more vital organ systems such that there is a high probability of imminent or life threatening deterioration in the patient's condition. This case involved decision making of high complexity to assess, manipulate, and support vital organ system failure and/or to prevent further life threatening deterioration of the patient's condition. Failure to initiate these interventions on an urgent basis would likely result in sudden, clinically significant or life threatening deterioration in the patient's condition.     Abnormal findings supporting critical care: hypoxic respiratory failure, hyperglycemia, fluid overload, ESRD  Interventions to support critical care: insulin gtt, neprhology consultation, hospitalist consultation, hemodynamic and respiratory monitoring  Failure to intervene may result in: shock, coma, death    EMERGENCY DEPARTMENT COURSE and DIFFERENTIAL DIAGNOSIS/MDM   Vitals:    Vitals:    03/04/23 1709 03/04/23 1710 03/04/23 1853 03/04/23 1918   BP: (!) 168/86  134/79 125/67   Pulse: 82 82 71 70   Resp: 23 22 15 13 Temp: 97.7 °F (36.5 °C)   97.1 °F (36.2 °C)   SpO2: (!) 89% 90% 92%         Patient was given the following medications:  Medications   insulin regular (NOVOLIN R, HUMULIN R) 100 Units in 0.9% sodium chloride 100 mL infusion (14.6 Units/hr IntraVENous Rate Verify 3/4/23 2153)   insulin lispro (HUMALOG) injection (0 Units SubCUTAneous Held 3/4/23 1630)   glucose chewable tablet 16 g (has no administration in time range)   glucagon (GLUCAGEN) injection 1 mg (has no administration in time range)   dextrose 10% infusion 0-250 mL (has no administration in time range)   sodium chloride (NS) flush 5-40 mL (10 mL IntraVENous Given 3/4/23 2128)   sodium chloride (NS) flush 5-40 mL (has no administration in time range)   acetaminophen (TYLENOL) tablet 650 mg (has no administration in time range)     Or   acetaminophen (TYLENOL) suppository 650 mg (has no administration in time range)   polyethylene glycol (MIRALAX) packet 17 g (has no administration in time range)   ondansetron (ZOFRAN ODT) tablet 4 mg (has no administration in time range)     Or   ondansetron (ZOFRAN) injection 4 mg (has no administration in time range)   amLODIPine (NORVASC) tablet 10 mg (has no administration in time range)   aspirin chewable tablet 81 mg (has no administration in time range)   carvediloL (COREG) tablet 12.5 mg ( Oral Canceled Entry 3/4/23 1700)   cloNIDine (CATAPRES) 0.1 mg/24 hr patch 1 Patch (has no administration in time range)   DULoxetine (CYMBALTA) capsule 60 mg (has no administration in time range)   finasteride (PROSCAR) tablet 5 mg (has no administration in time range)   hydrALAZINE (APRESOLINE) tablet 100 mg (100 mg Oral Given 3/4/23 2127)   losartan (COZAAR) tablet 50 mg (has no administration in time range)   pantoprazole (PROTONIX) tablet 40 mg (has no administration in time range)   pregabalin (LYRICA) capsule 75 mg (75 mg Oral Given 3/4/23 2127)   rosuvastatin (CRESTOR) tablet 40 mg (40 mg Oral Given 3/4/23 2127) sevelamer carbonate (RENVELA) tab 1,600 mg (0 mg Oral Held 3/4/23 1700)   sodium bicarbonate tablet 650 mg (650 mg Oral Given 3/4/23 2127)   sucralfate (CARAFATE) tablet 1 g (1 g Oral Given 3/4/23 2127)   tamsulosin (FLOMAX) capsule 0.4 mg (has no administration in time range)   heparin (porcine) injection 5,000 Units (5,000 Units SubCUTAneous Given 3/4/23 2128)   oxyCODONE IR (ROXICODONE) tablet 5 mg (5 mg Oral Given 3/4/23 1609)   insulin glargine (LANTUS) injection 10 Units (10 Units SubCUTAneous Given 3/4/23 2128)   cefTRIAXone (ROCEPHIN) 1 g in 0.9% sodium chloride (MBP/ADV) 50 mL MBP (0 g IntraVENous Held 3/4/23 1800)   furosemide (LASIX) tablet 40 mg (has no administration in time range)   furosemide (LASIX) injection 40 mg (40 mg IntraVENous Given 3/4/23 1558)       CONSULTS: (Who and What was discussed)  IP CONSULT TO NEPHROLOGY    Chronic Conditions: esrd on dialysis, diabetes    Social Determinants affecting Dx or Tx: None    Records Reviewed (source and summary of external notes): Prior medical records, Previous Radiology studies, and Previous Laboratory studies including notes from ED visit today at outside hospital    CC/HPI Summary, DDx, ED Course, and Reassessment: Patient presenting to the emergency department with chief complaint of shortness of breath in the setting of missing dialysis yesterday. Seen at outside hospital, had x-rays and labs done significant for bilateral pleural effusions and an increase in his oxygen requirement. His nephrologist Dr. Garcia Hidden is here so he was transferred here for further work-up. His lab work at outside hospital was reportedly unremarkable, troponin was negative, EKG was negative for ischemia. We will repeat labs, repeat chest x-ray, consult nephrology anticipate patient require admission given the extent of his pleural effusions and pulmonary edema.     ED Course as of 03/04/23 2200   Sat Mar 04, 2023   1351 Consult placed to Nephrology for HD, patient with large pleural effusion on Xray, missed dialysis yesterday. Dr. Lee Ann Spencer on call [NM]   1440 Pt's BG is significantly elevated to 591, was also in the 500s at outside ED but does not appear to have been treated, he is acidotic to 7.2 bicarb is 21, no anion gap creatinine is 7.4 potassium is 4.4. Pending formal CMP, will need insulin drip, dialysis and admission [NM]   1527 Per chart review, patient received zosyn and oxycodone 20mg prior to transfer from [facility] [NM]      ED Course User Index  [NM] Qiana Kumar, DO     With hospitalist regarding initiation of insulin drip versus IV insulin. Patient's blood glucoses in the 500s, no anion gap but acidotic to 7.20, likely due to hypoxia. Will start insulin gtt, admit to hospitalist.    Disposition Considerations (Tests not done, Shared Decision Making, Pt Expectation of Test or Tx.):     FINAL IMPRESSION     1. Acute respiratory failure with hypoxia ([address]    2. Acute pulmonary edema (HCC)    3. Hyperglycemia          DISPOSITION/PLAN   Admitted         PATIENT REFERRED TO:  Follow-up Information    None           DISCHARGE MEDICATIONS:  Current Discharge Medication List            DISCONTINUED MEDICATIONS:  Current Discharge Medication List            (Please note that parts of this dictation were completed with voice recognition software. Quite often unanticipated grammatical, syntax, homophones, and other interpretive errors are inadvertently transcribed by the computer software. Please disregards these errors.  Please excuse any errors that have escaped final proofreading.)    Evan Dawson,

## 2023-03-05 LAB
ADMINISTERED INITIALS, ADMINIT: NORMAL
ADMINISTERED INITIALS, ADMINIT: NORMAL
ANION GAP SERPL CALC-SCNC: 11 MMOL/L (ref 5–15)
ANION GAP SERPL CALC-SCNC: 9 MMOL/L (ref 5–15)
ATRIAL RATE: 77 BPM
BASOPHILS # BLD: 0 K/UL (ref 0–0.1)
BASOPHILS NFR BLD: 0 % (ref 0–1)
BUN SERPL-MCNC: 68 MG/DL (ref 6–20)
BUN SERPL-MCNC: 70 MG/DL (ref 6–20)
BUN/CREAT SERPL: 10 (ref 12–20)
BUN/CREAT SERPL: 10 (ref 12–20)
CALCIUM SERPL-MCNC: 8 MG/DL (ref 8.5–10.1)
CALCIUM SERPL-MCNC: 8.3 MG/DL (ref 8.5–10.1)
CALCULATED P AXIS, ECG09: 42 DEGREES
CALCULATED R AXIS, ECG10: 45 DEGREES
CALCULATED T AXIS, ECG11: 91 DEGREES
CHLORIDE SERPL-SCNC: 96 MMOL/L (ref 97–108)
CHLORIDE SERPL-SCNC: 98 MMOL/L (ref 97–108)
CO2 SERPL-SCNC: 23 MMOL/L (ref 21–32)
CO2 SERPL-SCNC: 23 MMOL/L (ref 21–32)
CREAT SERPL-MCNC: 7.07 MG/DL (ref 0.7–1.3)
CREAT SERPL-MCNC: 7.23 MG/DL (ref 0.7–1.3)
D50 ADMINISTERED, D50ADM: 0 ML
D50 ADMINISTERED, D50ADM: 0 ML
D50 ORDER, D50ORD: 0 ML
D50 ORDER, D50ORD: 0 ML
DIAGNOSIS, 93000: NORMAL
DIFFERENTIAL METHOD BLD: ABNORMAL
EOSINOPHIL # BLD: 0 K/UL (ref 0–0.4)
EOSINOPHIL NFR BLD: 0 % (ref 0–7)
ERYTHROCYTE [DISTWIDTH] IN BLOOD BY AUTOMATED COUNT: 15.8 % (ref 11.5–14.5)
GLUCOSE BLD STRIP.AUTO-MCNC: 101 MG/DL (ref 65–117)
GLUCOSE BLD STRIP.AUTO-MCNC: 103 MG/DL (ref 65–117)
GLUCOSE BLD STRIP.AUTO-MCNC: 144 MG/DL (ref 65–117)
GLUCOSE BLD STRIP.AUTO-MCNC: 156 MG/DL (ref 65–117)
GLUCOSE BLD STRIP.AUTO-MCNC: 159 MG/DL (ref 65–117)
GLUCOSE BLD STRIP.AUTO-MCNC: 205 MG/DL (ref 65–117)
GLUCOSE BLD STRIP.AUTO-MCNC: 246 MG/DL (ref 65–117)
GLUCOSE BLD STRIP.AUTO-MCNC: 80 MG/DL (ref 65–117)
GLUCOSE BLD STRIP.AUTO-MCNC: 96 MG/DL (ref 65–117)
GLUCOSE SERPL-MCNC: 184 MG/DL (ref 65–100)
GLUCOSE SERPL-MCNC: 94 MG/DL (ref 65–100)
GLUCOSE, GLC: 103 MG/DL
GLUCOSE, GLC: 156 MG/DL
HCT VFR BLD AUTO: 39.3 % (ref 36.6–50.3)
HGB BLD-MCNC: 11.9 G/DL (ref 12.1–17)
HIGH TARGET, HITG: 200 MG/DL
HIGH TARGET, HITG: 200 MG/DL
IMM GRANULOCYTES # BLD AUTO: 0 K/UL (ref 0–0.04)
IMM GRANULOCYTES NFR BLD AUTO: 0 % (ref 0–0.5)
INSULIN ADMINSTERED, INSADM: 2.8 UNITS/HOUR
INSULIN ADMINSTERED, INSADM: 7.7 UNITS/HOUR
INSULIN ORDER, INSORD: 2.8 UNITS/HOUR
INSULIN ORDER, INSORD: 7.7 UNITS/HOUR
LOW TARGET, LOT: 150 MG/DL
LOW TARGET, LOT: 150 MG/DL
LYMPHOCYTES # BLD: 0.3 K/UL (ref 0.8–3.5)
LYMPHOCYTES NFR BLD: 4 % (ref 12–49)
MAGNESIUM SERPL-MCNC: 2.8 MG/DL (ref 1.6–2.4)
MCH RBC QN AUTO: 25.2 PG (ref 26–34)
MCHC RBC AUTO-ENTMCNC: 30.3 G/DL (ref 30–36.5)
MCV RBC AUTO: 83.3 FL (ref 80–99)
MINUTES UNTIL NEXT BG, NBG: 60 MIN
MINUTES UNTIL NEXT BG, NBG: 60 MIN
MONOCYTES # BLD: 0.3 K/UL (ref 0–1)
MONOCYTES NFR BLD: 4 % (ref 5–13)
MULTIPLIER, MUL: 0.06
MULTIPLIER, MUL: 0.08
NEUTS SEG # BLD: 6.2 K/UL (ref 1.8–8)
NEUTS SEG NFR BLD: 91 % (ref 32–75)
NRBC # BLD: 0 K/UL (ref 0–0.01)
NRBC BLD-RTO: 0 PER 100 WBC
ORDER INITIALS, ORDINIT: NORMAL
ORDER INITIALS, ORDINIT: NORMAL
P-R INTERVAL, ECG05: 194 MS
PLATELET # BLD AUTO: 184 K/UL (ref 150–400)
PMV BLD AUTO: 10.7 FL (ref 8.9–12.9)
POTASSIUM SERPL-SCNC: 3.9 MMOL/L (ref 3.5–5.1)
POTASSIUM SERPL-SCNC: 4.3 MMOL/L (ref 3.5–5.1)
Q-T INTERVAL, ECG07: 436 MS
QRS DURATION, ECG06: 106 MS
QTC CALCULATION (BEZET), ECG08: 493 MS
RBC # BLD AUTO: 4.72 M/UL (ref 4.1–5.7)
SERVICE CMNT-IMP: ABNORMAL
SERVICE CMNT-IMP: NORMAL
SODIUM SERPL-SCNC: 128 MMOL/L (ref 136–145)
SODIUM SERPL-SCNC: 132 MMOL/L (ref 136–145)
VENTRICULAR RATE, ECG03: 77 BPM
WBC # BLD AUTO: 6.8 K/UL (ref 4.1–11.1)

## 2023-03-05 PROCEDURE — 74011250637 HC RX REV CODE- 250/637: Performed by: GENERAL ACUTE CARE HOSPITAL

## 2023-03-05 PROCEDURE — 74011000258 HC RX REV CODE- 258: Performed by: INTERNAL MEDICINE

## 2023-03-05 PROCEDURE — 74011250637 HC RX REV CODE- 250/637: Performed by: INTERNAL MEDICINE

## 2023-03-05 PROCEDURE — 65270000046 HC RM TELEMETRY

## 2023-03-05 PROCEDURE — 82962 GLUCOSE BLOOD TEST: CPT

## 2023-03-05 PROCEDURE — 74011636637 HC RX REV CODE- 636/637: Performed by: INTERNAL MEDICINE

## 2023-03-05 PROCEDURE — 74011250636 HC RX REV CODE- 250/636: Performed by: INTERNAL MEDICINE

## 2023-03-05 PROCEDURE — 36415 COLL VENOUS BLD VENIPUNCTURE: CPT

## 2023-03-05 PROCEDURE — 85025 COMPLETE CBC W/AUTO DIFF WBC: CPT

## 2023-03-05 PROCEDURE — 74011636637 HC RX REV CODE- 636/637: Performed by: GENERAL ACUTE CARE HOSPITAL

## 2023-03-05 PROCEDURE — 83735 ASSAY OF MAGNESIUM: CPT

## 2023-03-05 PROCEDURE — 74011000250 HC RX REV CODE- 250: Performed by: INTERNAL MEDICINE

## 2023-03-05 PROCEDURE — 5A1D70Z PERFORMANCE OF URINARY FILTRATION, INTERMITTENT, LESS THAN 6 HOURS PER DAY: ICD-10-PCS | Performed by: INTERNAL MEDICINE

## 2023-03-05 PROCEDURE — 80048 BASIC METABOLIC PNL TOTAL CA: CPT

## 2023-03-05 PROCEDURE — 90935 HEMODIALYSIS ONE EVALUATION: CPT

## 2023-03-05 RX ORDER — DEXTROSE MONOHYDRATE 100 MG/ML
0-250 INJECTION, SOLUTION INTRAVENOUS AS NEEDED
Status: DISCONTINUED | OUTPATIENT
Start: 2023-03-05 | End: 2023-03-13 | Stop reason: HOSPADM

## 2023-03-05 RX ORDER — INSULIN LISPRO 100 [IU]/ML
INJECTION, SOLUTION INTRAVENOUS; SUBCUTANEOUS
Status: DISCONTINUED | OUTPATIENT
Start: 2023-03-05 | End: 2023-03-13 | Stop reason: HOSPADM

## 2023-03-05 RX ORDER — CLONIDINE HYDROCHLORIDE 0.1 MG/1
0.1 TABLET ORAL
Status: DISCONTINUED | OUTPATIENT
Start: 2023-03-05 | End: 2023-03-13 | Stop reason: HOSPADM

## 2023-03-05 RX ORDER — IBUPROFEN 200 MG
4 TABLET ORAL AS NEEDED
Status: DISCONTINUED | OUTPATIENT
Start: 2023-03-05 | End: 2023-03-13 | Stop reason: HOSPADM

## 2023-03-05 RX ADMIN — SODIUM CHLORIDE, PRESERVATIVE FREE 10 ML: 5 INJECTION INTRAVENOUS at 13:16

## 2023-03-05 RX ADMIN — OXYCODONE 5 MG: 5 TABLET ORAL at 15:51

## 2023-03-05 RX ADMIN — HYDRALAZINE HYDROCHLORIDE 100 MG: 50 TABLET, FILM COATED ORAL at 21:51

## 2023-03-05 RX ADMIN — DULOXETINE 60 MG: 30 CAPSULE, DELAYED RELEASE ORAL at 08:39

## 2023-03-05 RX ADMIN — ROSUVASTATIN CALCIUM 40 MG: 40 TABLET, FILM COATED ORAL at 21:51

## 2023-03-05 RX ADMIN — SODIUM BICARBONATE 650 MG: 650 TABLET ORAL at 08:38

## 2023-03-05 RX ADMIN — FUROSEMIDE 40 MG: 40 TABLET ORAL at 17:36

## 2023-03-05 RX ADMIN — SODIUM CHLORIDE, PRESERVATIVE FREE 10 ML: 5 INJECTION INTRAVENOUS at 21:53

## 2023-03-05 RX ADMIN — TAMSULOSIN HYDROCHLORIDE 0.4 MG: 0.4 CAPSULE ORAL at 08:38

## 2023-03-05 RX ADMIN — SUCRALFATE 1 G: 1 TABLET ORAL at 15:51

## 2023-03-05 RX ADMIN — OXYCODONE 5 MG: 5 TABLET ORAL at 22:48

## 2023-03-05 RX ADMIN — SEVELAMER CARBONATE 1600 MG: 800 TABLET, FILM COATED ORAL at 17:01

## 2023-03-05 RX ADMIN — SEVELAMER CARBONATE 1600 MG: 800 TABLET, FILM COATED ORAL at 08:38

## 2023-03-05 RX ADMIN — HYDRALAZINE HYDROCHLORIDE 100 MG: 50 TABLET, FILM COATED ORAL at 15:51

## 2023-03-05 RX ADMIN — AMLODIPINE BESYLATE 10 MG: 5 TABLET ORAL at 13:27

## 2023-03-05 RX ADMIN — HEPARIN SODIUM 5000 UNITS: 5000 INJECTION INTRAVENOUS; SUBCUTANEOUS at 21:51

## 2023-03-05 RX ADMIN — HEPARIN SODIUM 5000 UNITS: 5000 INJECTION INTRAVENOUS; SUBCUTANEOUS at 08:39

## 2023-03-05 RX ADMIN — Medication 2 UNITS: at 21:51

## 2023-03-05 RX ADMIN — INSULIN GLARGINE 10 UNITS: 100 INJECTION, SOLUTION SUBCUTANEOUS at 21:52

## 2023-03-05 RX ADMIN — CLONIDINE HYDROCHLORIDE 0.1 MG: 0.1 TABLET ORAL at 16:38

## 2023-03-05 RX ADMIN — LOSARTAN POTASSIUM 50 MG: 50 TABLET, FILM COATED ORAL at 13:27

## 2023-03-05 RX ADMIN — SEVELAMER CARBONATE 1600 MG: 800 TABLET, FILM COATED ORAL at 13:10

## 2023-03-05 RX ADMIN — SUCRALFATE 1 G: 1 TABLET ORAL at 21:51

## 2023-03-05 RX ADMIN — SODIUM CHLORIDE 1 G: 900 INJECTION INTRAVENOUS at 17:36

## 2023-03-05 RX ADMIN — FINASTERIDE 5 MG: 5 TABLET, FILM COATED ORAL at 08:38

## 2023-03-05 RX ADMIN — SUCRALFATE 1 G: 1 TABLET ORAL at 08:38

## 2023-03-05 RX ADMIN — PREGABALIN 75 MG: 50 CAPSULE ORAL at 21:51

## 2023-03-05 RX ADMIN — Medication 4 UNITS: at 17:01

## 2023-03-05 RX ADMIN — CLONIDINE HYDROCHLORIDE 0.1 MG: 0.1 TABLET ORAL at 22:48

## 2023-03-05 RX ADMIN — SODIUM CHLORIDE, PRESERVATIVE FREE 10 ML: 5 INJECTION INTRAVENOUS at 05:20

## 2023-03-05 RX ADMIN — SUCRALFATE 1 G: 1 TABLET ORAL at 13:10

## 2023-03-05 RX ADMIN — OXYCODONE 5 MG: 5 TABLET ORAL at 08:38

## 2023-03-05 RX ADMIN — PANTOPRAZOLE SODIUM 40 MG: 40 TABLET, DELAYED RELEASE ORAL at 08:38

## 2023-03-05 RX ADMIN — CARVEDILOL 12.5 MG: 12.5 TABLET, FILM COATED ORAL at 17:05

## 2023-03-05 NOTE — PROGRESS NOTES
1709 - TRANSFER - IN REPORT:    Verbal report received from ASAD Dick(name) on Claudia Ramirez  being received from ED(unit) for routine progression of care      Report consisted of patients Situation, Background, Assessment and   Recommendations(SBAR). Information from the following report(s) SBAR, Kardex, ED Summary, Recent Results, and Cardiac Rhythm NSR  was reviewed with the receiving nurse. Opportunity for questions and clarification was provided. Assessment completed upon patients arrival to unit and care assumed. Eliane 61 about if patient had other pain medication scheduled, per [place], did not place another order as it could compromise patient's breathing. End of Shift Note    Bedside shift change report given to Elisabeth Camacho (oncoming nurse) by Gurvinder Martinez RN (offgoing nurse). Report included the following information SBAR, Kardex, ED Summary, Recent Results, and Cardiac Rhythm NSR    Shift worked:  0576-3139     Shift summary and any significant changes:     Patient currently on insulin gtt, expected to get dialysis tonight      Concerns for physician to address:  Pain management      Zone phone for oncoming shift:   [phone]       Activity:     Number times ambulated in hallways past shift: 0  Number of times OOB to chair past shift: 0    Cardiac:   Cardiac Monitoring: Yes      Cardiac Rhythm: Sinus Rhythm    Access:  Current line(s): PIV and HD access     Genitourinary:   Urinary status: oliguric    Respiratory:   O2 Device: Nasal cannula  Chronic home O2 use?: NO  Incentive spirometer at bedside: NO       GI:     Current diet:  ADULT DIET Regular; 3 carb choices (45 gm/meal); Low Potassium (Less than 3000 mg/day);  Low Phosphorus (Less than 1000 mg)  Passing flatus: YES  Tolerating current diet: YES       Pain Management:   Patient states pain is manageable on current regimen: YES    Skin:     Interventions: float heels and nutritional support     Patient Safety:  Fall Score:    Interventions: bed/chair alarm, gripper socks, pt to call before getting OOB, and stay with me (per policy)       Length of Stay:  Expected LOS: - - -  Actual LOS: 0      Andrew Ruano RN

## 2023-03-05 NOTE — PROGRESS NOTES
Provider MANUEL Spencer notified via [employer] of pt's BG level and lab values, instructed insulin drip to be stopped. Pt successfully taking PO nutrition. Labs to be drawn again at 0400.

## 2023-03-05 NOTE — PROGRESS NOTES
End of Shift Note    Bedside shift change report given to Luz Marina Swanson (oncoming nurse) by Bakari Workman RN (offgoing nurse). Report included the following information SBAR, Kardex, Intake/Output, MAR, and Recent Results    Shift worked:  0082-2292     Shift summary and any significant changes:     Pt's insulin gtt stopped, BG stable since coming off. Unable to wean O2, HD not done overnight but pt is supposed to be dialyzed first thing this AM per [address]. C/o pain x1 overnight, prn oxycodone given.       Concerns for physician to address:  none     Zone phone for oncoming shift:          Bakari Workman RN

## 2023-03-05 NOTE — PROCEDURES
Hemodialysis / [phone]    Vitals Pre Post Assessment Pre Post   BP BP: 134/81 (03/05/23 0815) 129/88 LOC A&Ox4, able to follow commands A&Ox4, able to follow commands   HR Pulse (Heart Rate): 74 (03/05/23 0815) 79 Lungs Clear, on 6 LPM Even and unlabored   Resp Resp Rate: 22 (03/05/23 0815) 17 Cardiac Regualr S1&S2 Regular S1 and S2   Temp Temp: 98 °F (36.7 °C) (03/05/23 0339) 97.3 Skin Warm and dry Warm and dry   Weight  NA NA Edema +2 edema on H[place] LE +2 edema on Haja LE   Tele status Bedside Bedside Pain Pain Intensity 1: 7 (03/05/23 0837) 0     Orders   Duration: Start: 6359 End: 1215 Total: 3 hours   Dialyzer:  Revaclear   K Bath:  3   Ca Bath:  2.5   Na:  138   Bicarb:  35   Target Fluid Removal:  3500 ml     Access   Type & Location: L upper arm AVG   Comments:                                     Left upper arm AV Graft without evidence of warmth, redness, or drainage. +thrill/+bruit. Each access site disinfected for 60 seconds per P&P. Cannulated with 15G needles x2 and secured with paper tape. +aspiration/+flushed.       Labs   HBsAg (Antigen) / date: Negative 02/15/2023                                              HBsAb (Antibody) / date: Susceptible 02/15/2023    Source: Physician portal   Obtained/Reviewed  Critical Results Called HGB   Date Value Ref Range Status   03/05/2023 11.9 (L) 12.1 - 17.0 g/dL Final     Potassium   Date Value Ref Range Status   03/05/2023 4.3 3.5 - 5.1 mmol/L Final     Calcium   Date Value Ref Range Status   03/05/2023 8.0 (L) 8.5 - 10.1 MG/DL Final     BUN   Date Value Ref Range Status   03/05/2023 70 (H) 6 - 20 MG/DL Final     Creatinine   Date Value Ref Range Status   03/05/2023 7.23 (H) 0.70 - 1.30 MG/DL Final        Meds Given   Name Dose Route   NA                 Adequacy / Fluid    Total Liters Process: 68.8 L   Net Fluid Removed: 5526 ml      Comments   Time Out Done:   (Time) 0845   Admitting Diagnosis: Acute on chronic respiratory failure with hypoxia   Consent obtained/signed:  Chronic consent applies   Machine / RO #  [id number]/ [id number]   Primary Nurse Rpt Pre: ORTEGA Rodrigez   Primary Nurse Rpt Post: ORTEGA Rodrigez   Pt Education: Procedural and infection control   Care Plan: Continue current HD plan of care   Pts outpatient [facility]: [facility]     Tx Summary   Comments:                         7897 Treatment initiated per physician order  1423 The lines connected to recircuit. Venous pressure is above recommended limit, venous needle adjusted but the pressure remains outside the recommended limit, needle removed with tip intact, pressure held to site. Hemostasis <5 minutes. New needle inserted and secured per P&P. HD resumed. The patient denies pain at the site and no signs of infiltration. 0945 The venous pressure is trending up. The needle was adjusted and BFR was decreased. Dr. Linda Beck was notified and instructed to reposition or restick. Needle repositioned and venous pressure is within appropriate limits, BFR increased as ordered. No clotting noted in the circuit after being flushed. The patient denies pain at the site and no signs of infiltration. 1050 20 ml of NS given for circuit flush. 1130 Venous pressure outside the recommended limits, needle repositioned. The patient denies pain at the site and no signs of infiltration. 1145 Venous pressure outside the recommended limits, needle and lines repositioned but no positive outcome. BFR decreased to 300. The patient denies pain at the site and no signs of infiltration. 1215 Venous pressure outside the limits even though BFR is only 250, blood rinsed back. Dr. Linda Beck was notified of early rinsed back with 35 minutes remaining. Verbal order treatment completed for today. HD treatment completed per physician order. All possible blood returned to patient. Hemostasis achieved in <10 minutes. Access site dressings clean, dry, and intact. +thrill remains.

## 2023-03-05 NOTE — PROGRESS NOTES
Problem: Falls - Risk of  Goal: *Absence of Falls  Description: Document Donnie Lyndsay Fall Risk and appropriate interventions in the flowsheet.   Note: Fall Risk Interventions:            Medication Interventions: Bed/chair exit alarm, Patient to call before getting OOB, Teach patient to arise slowly                   Problem: Patient Education: Go to Patient Education Activity  Goal: Patient/Family Education  Outcome: Progressing Towards Goal

## 2023-03-05 NOTE — PROGRESS NOTES
ANABELL ANDERSEN [facility]      [address]                [address], [address]         [place], [address] [facility]      Phone: [phone]   Fax: [phone]      Phone: [phone]   [phone]     [email or web address]    Nephrology Progress Note  Pt Name : Gen Lipoma  Date of Admission : 3/4/2023    CC: Follow up for ESRD on HD       Assessment and Plan   ESRD on HD  - MWF as outpt  - outpt HD at [facility] w/ Dr. Misha Lopes  - access: LUE AVG  - HD vintage: 9/2022  - last HD 3/1 (missed yesterday)  - did not get HD yesterday despite notifying [facility] staff issues  - received HD today: had issues w/ hi venous pressures  - plan for HD again tomorrow to optimize vol  - still makes urine  - can be on Lasix 40mg PO BID off HD days  - renal diet  - renally dose meds    AOCD  - Hb 11.9    CKD-MBD  - goal corCa 8.4-10.2 , phos  <5.5    Pulm edema  - CXR: L>R pulm edema  - HD today and again tomorrow    HTN    Uncontrolled DM  - better; currently off insulin gtt     For other plans, see orders. Interval History: Seen after HD. Had some issue w/ hi venous pressure but able to complete most of HD. Breathing better. Review of Systems: A comprehensive review of systems was negative except for that written in the HPI.     Current Facility-Administered Medications   Medication Dose Route Frequency    insulin regular (NOVOLIN R, HUMULIN R) 100 Units in 0.9% sodium chloride 100 mL infusion  0-50 Units/hr IntraVENous TITRATE    insulin lispro (HUMALOG) injection   SubCUTAneous TIDAC    glucose chewable tablet 16 g  4 Tablet Oral PRN    glucagon (GLUCAGEN) injection 1 mg  1 mg IntraMUSCular PRN    dextrose 10% infusion 0-250 mL  0-250 mL IntraVENous PRN    sodium chloride (NS) flush 5-40 mL  5-40 mL IntraVENous Q8H    sodium chloride (NS) flush 5-40 mL  5-40 mL IntraVENous PRN    acetaminophen (TYLENOL) tablet 650 mg  650 mg Oral Q6H PRN    Or    acetaminophen (TYLENOL) suppository 650 mg  650 mg Rectal Q6H PRN    polyethylene glycol (MIRALAX) packet 17 g  17 g Oral DAILY PRN    ondansetron (ZOFRAN ODT) tablet 4 mg  4 mg Oral Q8H PRN    Or    ondansetron (ZOFRAN) injection 4 mg  4 mg IntraVENous Q6H PRN    amLODIPine (NORVASC) tablet 10 mg  10 mg Oral DAILY    aspirin chewable tablet 81 mg  81 mg Oral DAILY    carvediloL (COREG) tablet 12.5 mg  12.5 mg Oral BID WITH MEALS    cloNIDine (CATAPRES) 0.1 mg/24 hr patch 1 Patch  1 Patch TransDERmal Q7D    DULoxetine (CYMBALTA) capsule 60 mg  60 mg Oral DAILY    finasteride (PROSCAR) tablet 5 mg  5 mg Oral DAILY    hydrALAZINE (APRESOLINE) tablet 100 mg  100 mg Oral TID    losartan (COZAAR) tablet 50 mg  50 mg Oral DAILY    pantoprazole (PROTONIX) tablet 40 mg  40 mg Oral DAILY    pregabalin (LYRICA) capsule 75 mg  75 mg Oral QHS    rosuvastatin (CRESTOR) tablet 40 mg  40 mg Oral QHS    sevelamer carbonate (RENVELA) tab 1,600 mg  1,600 mg Oral TID WITH MEALS    sucralfate (CARAFATE) tablet 1 g  1 g Oral AC&HS    tamsulosin (FLOMAX) capsule 0.4 mg  0.4 mg Oral DAILY    heparin (porcine) injection 5,000 Units  5,000 Units SubCUTAneous Q12H    oxyCODONE IR (ROXICODONE) tablet 5 mg  5 mg Oral Q6H PRN    insulin glargine (LANTUS) injection 10 Units  10 Units SubCUTAneous QHS    cefTRIAXone (ROCEPHIN) 1 g in 0.9% sodium chloride (MBP/ADV) 50 mL MBP  1 g IntraVENous Q24H    furosemide (LASIX) tablet 40 mg  40 mg Oral BID      No Known Allergies    Objective:  Vitals:    Vitals:    03/05/23 1145 03/05/23 1200 03/05/23 1216 03/05/23 1230   BP: 132/78 131/83 129/88 (!) 153/76   Pulse: 77 78 79 79   Resp: 18 11 17 11   Temp:       SpO2: 96% 96% (!) 89% 96%   Weight:         Intake and Output:  No intake/output data recorded.   03/03 1901 - 03/05 0700  In: 125 [P.O.:125]  Out: -     Physical Examination:  Pt intubated    No  General: NAD,Conversant   Neck:  Supple, no mass  Resp:  Lungs CTA B/L, no wheezing , normal respiratory effort  CV:  RRR,  no murmur or rub,+1 LE edema  GI:  Soft, NT, + Bowel sounds, no hepatosplenomegaly  Neurologic:  Non focal  Psych:             AAO x 3 appropriate affect   Skin:  No Rash  :  Beard -  Access: LUE AVG good thrill/bruit    []    High complexity decision making was performed  []    Patient is at high-risk of decompensation with multiple organ involvement    Lab Data Personally Reviewed: I have reviewed all the pertinent labs, microbiology data and radiology studies during assessment. Recent Labs     03/05/23  0406 03/05/23  0011 03/04/23 2021 03/04/23  1537 03/04/23  1403   * 132* 128* 128* 126*   K 4.3 3.9 3.9 4.6 4.2   CL 96* 98 95* 96* 95*   CO2 23 23 22 21 22   GLU 94 184* 377* 617* 595*   BUN 70* 68* 68* 63* 60*   CREA 7.23* 7.07* 7.16* 6.88* 6.80*   CA 8.0* 8.3* 8.1* 8.2* 7.8*   MG 2.8*  --  2.6* 2.7*  --    PHOS  --   --   --  7.8*  --    ALB  --   --   --   --  2.6*   ALT  --   --   --   --  16     Recent Labs     03/05/23  0011 03/04/23  1403   WBC 6.8 7.1   HGB 11.9* 11.9*   HCT 39.3 39.8    181     Lab Results   Component Value Date/Time    Specimen Description: NASAL 04/30/2014 03:30 AM    Specimen Description: NARES 01/28/2014 08:30 PM    Specimen Description: NARES 07/04/2013 11:00 PM     Lab Results   Component Value Date/Time    Culture result: MRSA PRESENT (A) 12/21/2022 03:44 PM    Culture result:  12/21/2022 03:44 PM     Screening of patient nares for MRSA is for surveillance purposes and, if positive, to facilitate isolation considerations in high risk settings. It is not intended for automatic decolonization interventions per se as regimens are not sufficiently effective to warrant routine use.       Culture result:  12/21/2022 03:44 PM     (NOTE) MRSA PRESENT CALLED TO READ BACK UNABLE TO CONTACT PROVIDER [facility]AT LOCATION CLOSED AFTER HOURS  FOLLOWUP  CB1 FOR  WITH RESULT CLIENT SERVICE [facility] IN AM WITH RESULT TO PROVIDER. Recent Results (from the past 24 hour(s))   EKG, 12 LEAD, INITIAL    Collection Time: 03/04/23  1:54 PM   Result Value Ref Range    Ventricular Rate 77 BPM    Atrial Rate 77 BPM    P-R Interval 194 ms    QRS Duration 106 ms    Q-T Interval 436 ms    QTC Calculation (Bezet) 493 ms    Calculated P Axis 42 degrees    Calculated R Axis 45 degrees    Calculated T Axis 91 degrees    Diagnosis       Normal sinus rhythm  Prolonged QT  When compared with ECG of 29-NOV-2022 20:52,  No significant change was found     CBC WITH AUTOMATED DIFF    Collection Time: 03/04/23  2:03 PM   Result Value Ref Range    WBC 7.1 4.1 - 11.1 K/uL    RBC 4.78 4.10 - 5.70 M/uL    HGB 11.9 (L) 12.1 - 17.0 g/dL    HCT 39.8 36.6 - 50.3 %    MCV 83.3 80.0 - 99.0 FL    MCH 24.9 (L) 26.0 - 34.0 PG    MCHC 29.9 (L) 30.0 - 36.5 g/dL    RDW 15.8 (H) 11.5 - 14.5 %    PLATELET 382 434 - 096 K/uL    MPV 11.6 8.9 - 12.9 FL    NRBC 0.0 0  WBC    ABSOLUTE NRBC 0.00 0.00 - 0.01 K/uL    NEUTROPHILS 87 (H) 32 - 75 %    LYMPHOCYTES 5 (L) 12 - 49 %    MONOCYTES 4 (L) 5 - 13 %    EOSINOPHILS 3 0 - 7 %    BASOPHILS 1 0 - 1 %    IMMATURE GRANULOCYTES 0 0.0 - 0.5 %    ABS. NEUTROPHILS 6.1 1.8 - 8.0 K/UL    ABS. LYMPHOCYTES 0.4 (L) 0.8 - 3.5 K/UL    ABS. MONOCYTES 0.3 0.0 - 1.0 K/UL    ABS. EOSINOPHILS 0.2 0.0 - 0.4 K/UL    ABS. BASOPHILS 0.1 0.0 - 0.1 K/UL    ABS. IMM.  GRANS. 0.0 0.00 - 0.04 K/UL    DF SMEAR SCANNED      RBC COMMENTS DEBBIE CELLS  PRESENT        RBC COMMENTS ANISOCYTOSIS  PRESENT       METABOLIC PANEL, COMPREHENSIVE    Collection Time: 03/04/23  2:03 PM   Result Value Ref Range    Sodium 126 (L) 136 - 145 mmol/L    Potassium 4.2 3.5 - 5.1 mmol/L    Chloride 95 (L) 97 - 108 mmol/L    CO2 22 21 - 32 mmol/L    Anion gap 9 5 - 15 mmol/L    Glucose 595 (H) 65 - 100 mg/dL    BUN 60 (H) 6 - 20 MG/DL    Creatinine 6.80 (H) 0.70 - 1.30 MG/DL    BUN/Creatinine ratio 9 (L) 12 - 20      eGFR 10 (L) >60 ml/min/1.73m2    Calcium 7.8 (L) 8.5 - 10.1 MG/DL    Bilirubin, total 0.5 0.2 - 1.0 MG/DL    ALT (SGPT) 16 12 - 78 U/L    AST (SGOT) 9 (L) 15 - 37 U/L    Alk.  phosphatase 156 (H) 45 - 117 U/L    Protein, total 6.4 6.4 - 8.2 g/dL    Albumin 2.6 (L) 3.5 - 5.0 g/dL    Globulin 3.8 2.0 - 4.0 g/dL    A-G Ratio 0.7 (L) 1.1 - 2.2     NT-PRO BNP    Collection Time: 03/04/23  2:03 PM   Result Value Ref Range    NT pro-BNP 32,904 (H) <125 PG/ML   TROPONIN-HIGH SENSITIVITY    Collection Time: 03/04/23  2:03 PM   Result Value Ref Range    Troponin-High Sensitivity 13 0 - 76 ng/L   BLOOD GAS,CHEM8,LACTIC ACID POC    Collection Time: 03/04/23  2:10 PM   Result Value Ref Range    pH, venous (POC) 7.20 (L) 7.32 - 7.42      pCO2, venous (POC) 53.6 (H) 41 - 51 MMHG    pO2, venous (POC) 60 (H) 25 - 40 mmHg    BICARBONATE 21 mmol/L    Base deficit (POC) 7.6 mmol/L    O2 SAT 84 %    Sodium,  (L) 136 - 145 MMOL/L    Potassium, POC 4.4 3.5 - 5.5 MMOL/L    Chloride, POC 94 (L) 100 - 108 MMOL/L    CO2, POC 21 19 - 24 MMOL/L    Anion gap, POC 11 10 - 20      Glucose,  (HH) 74 - 106 MG/DL    Creatinine, POC 7.4 (H) 0.6 - 1.3 MG/DL    eGFR (POC) 9 (L) >60 ml/min/1.73m2    Calcium, ionized (POC) 1.07 (L) 1.12 - 1.32 mmol/L    Lactic Acid (POC) 0.60 0.40 - 2.00 mmol/L    Sample source VENOUS BLOOD     GLUCOSE, POC    Collection Time: 03/04/23  3:22 PM   Result Value Ref Range    Glucose (POC) 536 (H) 65 - 117 mg/dL    Performed by Joss Zarate RN    METABOLIC PANEL, BASIC    Collection Time: 03/04/23  3:37 PM   Result Value Ref Range    Sodium 128 (L) 136 - 145 mmol/L    Potassium 4.6 3.5 - 5.1 mmol/L    Chloride 96 (L) 97 - 108 mmol/L    CO2 21 21 - 32 mmol/L    Anion gap 11 5 - 15 mmol/L    Glucose 617 (HH) 65 - 100 mg/dL    BUN 63 (H) 6 - 20 MG/DL    Creatinine 6.88 (H) 0.70 - 1.30 MG/DL    BUN/Creatinine ratio 9 (L) 12 - 20      eGFR 10 (L) >60 ml/min/1.73m2    Calcium 8.2 (L) 8.5 - 10.1 MG/DL   MAGNESIUM    Collection Time: 03/04/23  3:37 PM   Result Value Ref Range    Magnesium 2.7 (H) 1.6 - 2.4 mg/dL   PHOSPHORUS    Collection Time: 03/04/23  3:37 PM   Result Value Ref Range    Phosphorus 7.8 (H) 2.6 - 4.7 MG/DL   HEMOGLOBIN A1C WITH EAG    Collection Time: 03/04/23  3:37 PM   Result Value Ref Range    Hemoglobin A1c 12.3 (H) 4.0 - 5.6 %    Est. average glucose 306 mg/dL   BETA-HYDROXYBUTYRATE    Collection Time: 03/04/23  3:37 PM   Result Value Ref Range    B-hydroxybutyrate 1.26 (H) <0.40 mmol/L   URINALYSIS W/ REFLEX CULTURE    Collection Time: 03/04/23  4:08 PM    Specimen: Urine   Result Value Ref Range    Color YELLOW/STRAW      Appearance CLEAR CLEAR      Specific gravity 1.023      pH (UA) 5.5 5.0 - 8.0      Protein >300 (A) NEG mg/dL    Glucose >1,000 (A) NEG mg/dL    Ketone TRACE (A) NEG mg/dL    Bilirubin Negative NEG      Blood SMALL (A) NEG      Urobilinogen 0.2 0.2 - 1.0 EU/dL    Nitrites Negative NEG      Leukocyte Esterase Negative NEG      WBC 0-4 0 - 4 /hpf    RBC 0-5 0 - 5 /hpf    Epithelial cells MODERATE (A) FEW /lpf    Bacteria Negative NEG /hpf    UA:UC IF INDICATED CULTURE NOT INDICATED BY UA RESULT CNI     GLUCOSTABILIZER    Collection Time: 03/04/23  4:15 PM   Result Value Ref Range    Glucose 536 mg/dL    Insulin order 9.5 units/hour    Insulin adminstered 9.5 units/hour    Multiplier 0.020     Low target 150 mg/dL    High target 200 mg/dL    D50 order 0.0 ml    D50 administered 0.00 ml    Minutes until next BG 60 min    Order initials jl     Administered initials jl    GLUCOSE, POC    Collection Time: 03/04/23  5:14 PM   Result Value Ref Range    Glucose (POC) 492 (H) 65 - 117 mg/dL    Performed by Jacob Umana RN    GLUCOSTABILIZER    Collection Time: 03/04/23  5:15 PM   Result Value Ref Range    Glucose 492 mg/dL    Insulin order 13.0 units/hour    Insulin adminstered 13.0 units/hour    Multiplier 0.030     Low target 150 mg/dL    High target 200 mg/dL    D50 order 0.0 ml    D50 administered 0.00 ml    Minutes until next BG 60 min    Order initials AM     Administered initials AM    GLUCOSE, POC    Collection Time: 03/04/23  6:24 PM   Result Value Ref Range    Glucose (POC) 436 (H) 65 - 117 mg/dL    Performed by Dilcia Pettit RN    GLUCOSTABILIZER    Collection Time: 03/04/23  6:25 PM   Result Value Ref Range    Glucose 436 mg/dL    Insulin order 15.0 units/hour    Insulin adminstered 15.0 units/hour    Multiplier 0.040     Low target 150 mg/dL    High target 200 mg/dL    D50 order 0.0 ml    D50 administered 0.00 ml    Minutes until next BG 60 min    Order initials AM     Administered initials AM    GLUCOSE, POC    Collection Time: 03/04/23  7:24 PM   Result Value Ref Range    Glucose (POC) 391 (H) 65 - 117 mg/dL    Performed by Vonn Goltz RN    GLUCOSTABILIZER    Collection Time: 03/04/23  7:29 PM   Result Value Ref Range    Glucose 391 mg/dL    Insulin order 16.6 units/hour    Insulin adminstered 16.6 units/hour    Multiplier 0.050     Low target 150 mg/dL    High target 200 mg/dL    D50 order 0.0 ml    D50 administered 0.00 ml    Minutes until next BG 60 min    Order initials JRT     Administered initials JRT    METABOLIC PANEL, BASIC    Collection Time: 03/04/23  8:21 PM   Result Value Ref Range    Sodium 128 (L) 136 - 145 mmol/L    Potassium 3.9 3.5 - 5.1 mmol/L    Chloride 95 (L) 97 - 108 mmol/L    CO2 22 21 - 32 mmol/L    Anion gap 11 5 - 15 mmol/L    Glucose 377 (H) 65 - 100 mg/dL    BUN 68 (H) 6 - 20 MG/DL    Creatinine 7.16 (H) 0.70 - 1.30 MG/DL    BUN/Creatinine ratio 9 (L) 12 - 20      eGFR 9 (L) >60 ml/min/1.73m2    Calcium 8.1 (L) 8.5 - 10.1 MG/DL   MAGNESIUM    Collection Time: 03/04/23  8:21 PM   Result Value Ref Range    Magnesium 2.6 (H) 1.6 - 2.4 mg/dL   GLUCOSE, POC    Collection Time: 03/04/23  8:28 PM   Result Value Ref Range    Glucose (POC) 353 (H) 65 - 117 mg/dL    Performed by Vonn Goltz RN    GLUCOSE, POC    Collection Time: 03/04/23  8:50 PM   Result Value Ref Range    Glucose (POC) 322 (H) 65 - 117 mg/dL Performed by Noni Neil RN    GLUCOSTABILIZER    Collection Time: 03/04/23  8:52 PM   Result Value Ref Range    Glucose 322 mg/dL    Insulin order 15.7 units/hour    Insulin adminstered 15.7 units/hour    Multiplier 0.060     Low target 150 mg/dL    High target 200 mg/dL    D50 order 0.0 ml    D50 administered 0.00 ml    Minutes until next BG 60 min    Order initials JRT     Administered initials JRT    GLUCOSE, POC    Collection Time: 03/04/23  9:52 PM   Result Value Ref Range    Glucose (POC) 269 (H) 65 - 117 mg/dL    Performed by Noni Neil RN    GLUCOSTABILIZER    Collection Time: 03/04/23  9:55 PM   Result Value Ref Range    Glucose 269 mg/dL    Insulin order 14.6 units/hour    Insulin adminstered 14.6 units/hour    Multiplier 0.070     Low target 150 mg/dL    High target 200 mg/dL    D50 order 0.0 ml    D50 administered 0.00 ml    Minutes until next BG 60 min    Order initials JRT     Administered initials JRT    GLUCOSE, POC    Collection Time: 03/04/23 10:55 PM   Result Value Ref Range    Glucose (POC) 235 (H) 65 - 117 mg/dL    Performed by Noni Neil RN    GLUCOSTABILIZER    Collection Time: 03/04/23 10:57 PM   Result Value Ref Range    Glucose 235 mg/dL    Insulin order 14.0 units/hour    Insulin adminstered 14.0 units/hour    Multiplier 0.080     Low target 150 mg/dL    High target 200 mg/dL    D50 order 0.0 ml    D50 administered 0.00 ml    Minutes until next BG 60 min    Order initials JRT     Administered initials JRT    GLUCOSE, POC    Collection Time: 03/04/23 11:56 PM   Result Value Ref Range    Glucose (POC) 178 (H) 65 - 117 mg/dL    Performed by Noni Neil RN    GLUCOSTABILIZER    Collection Time: 03/04/23 11:59 PM   Result Value Ref Range    Glucose 178 mg/dL    Insulin order 9.4 units/hour    Insulin adminstered 9.4 units/hour    Multiplier 0.080     Low target 150 mg/dL    High target 200 mg/dL    D50 order 0.0 ml    D50 administered 0.00 ml    Minutes until next BG 60 min Order initials JRT     Administered initials JRT    METABOLIC PANEL, BASIC    Collection Time: 03/05/23 12:11 AM   Result Value Ref Range    Sodium 132 (L) 136 - 145 mmol/L    Potassium 3.9 3.5 - 5.1 mmol/L    Chloride 98 97 - 108 mmol/L    CO2 23 21 - 32 mmol/L    Anion gap 11 5 - 15 mmol/L    Glucose 184 (H) 65 - 100 mg/dL    BUN 68 (H) 6 - 20 MG/DL    Creatinine 7.07 (H) 0.70 - 1.30 MG/DL    BUN/Creatinine ratio 10 (L) 12 - 20      eGFR 9 (L) >60 ml/min/1.73m2    Calcium 8.3 (L) 8.5 - 10.1 MG/DL   CBC WITH AUTOMATED DIFF    Collection Time: 03/05/23 12:11 AM   Result Value Ref Range    WBC 6.8 4.1 - 11.1 K/uL    RBC 4.72 4.10 - 5.70 M/uL    HGB 11.9 (L) 12.1 - 17.0 g/dL    HCT 39.3 36.6 - 50.3 %    MCV 83.3 80.0 - 99.0 FL    MCH 25.2 (L) 26.0 - 34.0 PG    MCHC 30.3 30.0 - 36.5 g/dL    RDW 15.8 (H) 11.5 - 14.5 %    PLATELET 123 486 - 657 K/uL    MPV 10.7 8.9 - 12.9 FL    NRBC 0.0 0  WBC    ABSOLUTE NRBC 0.00 0.00 - 0.01 K/uL    NEUTROPHILS 91 (H) 32 - 75 %    LYMPHOCYTES 4 (L) 12 - 49 %    MONOCYTES 4 (L) 5 - 13 %    EOSINOPHILS 0 0 - 7 %    BASOPHILS 0 0 - 1 %    IMMATURE GRANULOCYTES 0 0.0 - 0.5 %    ABS. NEUTROPHILS 6.2 1.8 - 8.0 K/UL    ABS. LYMPHOCYTES 0.3 (L) 0.8 - 3.5 K/UL    ABS. MONOCYTES 0.3 0.0 - 1.0 K/UL    ABS. EOSINOPHILS 0.0 0.0 - 0.4 K/UL    ABS. BASOPHILS 0.0 0.0 - 0.1 K/UL    ABS. IMM.  GRANS. 0.0 0.00 - 0.04 K/UL    DF AUTOMATED     GLUCOSE, POC    Collection Time: 03/05/23 12:57 AM   Result Value Ref Range    Glucose (POC) 156 (H) 65 - 117 mg/dL    Performed by Harley Seals RN    GLUCOSTABILIZER    Collection Time: 03/05/23  1:00 AM   Result Value Ref Range    Glucose 156 mg/dL    Insulin order 7.7 units/hour    Insulin adminstered 7.7 units/hour    Multiplier 0.080     Low target 150 mg/dL    High target 200 mg/dL    D50 order 0.0 ml    D50 administered 0.00 ml    Minutes until next BG 60 min    Order initials JRT     Administered initials JRT    GLUCOSE, POC    Collection Time: 03/05/23  1:59 AM   Result Value Ref Range    Glucose (POC) 103 65 - 117 mg/dL    Performed by Flaca Ruano RN    GLUCOSTABILIZER    Collection Time: 03/05/23  2:02 AM   Result Value Ref Range    Glucose 103 mg/dL    Insulin order 2.8 units/hour    Insulin adminstered 2.8 units/hour    Multiplier 0.064     Low target 150 mg/dL    High target 200 mg/dL    D50 order 0.0 ml    D50 administered 0.00 ml    Minutes until next BG 60 min    Order initials JRT     Administered initials JRT    GLUCOSE, POC    Collection Time: 03/05/23  3:01 AM   Result Value Ref Range    Glucose (POC) 80 65 - 117 mg/dL    Performed by Flaca Ruano RN    GLUCOSE, POC    Collection Time: 03/05/23  3:59 AM   Result Value Ref Range    Glucose (POC) 96 65 - 117 mg/dL    Performed by Flaca Ruano RN    METABOLIC PANEL, BASIC    Collection Time: 03/05/23  4:06 AM   Result Value Ref Range    Sodium 128 (L) 136 - 145 mmol/L    Potassium 4.3 3.5 - 5.1 mmol/L    Chloride 96 (L) 97 - 108 mmol/L    CO2 23 21 - 32 mmol/L    Anion gap 9 5 - 15 mmol/L    Glucose 94 65 - 100 mg/dL    BUN 70 (H) 6 - 20 MG/DL    Creatinine 7.23 (H) 0.70 - 1.30 MG/DL    BUN/Creatinine ratio 10 (L) 12 - 20      eGFR 9 (L) >60 ml/min/1.73m2    Calcium 8.0 (L) 8.5 - 10.1 MG/DL   MAGNESIUM    Collection Time: 03/05/23  4:06 AM   Result Value Ref Range    Magnesium 2.8 (H) 1.6 - 2.4 mg/dL   GLUCOSE, POC    Collection Time: 03/05/23  5:19 AM   Result Value Ref Range    Glucose (POC) 101 65 - 117 mg/dL    Performed by Flaca Ruano RN    GLUCOSE, POC    Collection Time: 03/05/23  7:58 AM   Result Value Ref Range    Glucose (POC) 144 (H) 65 - 117 mg/dL    Performed by Susi Richards RN    GLUCOSE, POC    Collection Time: 03/05/23 12:05 PM   Result Value Ref Range    Glucose (POC) 159 (H) 65 - 117 mg/dL    Performed by Chelsey Oakes (PCT)        Total time spent with patient:  20  min.                                Care Plan discussed with:  Patient  x   Family      RN      Consulting Physician /Specialist        I have reviewed the flowsheets. Chart reviewed. Pertinent Notes reviewed. Current Medications list reviewed by me. No change in PMH ,family and social history from Consult note.     Kathia Deal MD  03/05/23

## 2023-03-06 ENCOUNTER — APPOINTMENT (OUTPATIENT)
Dept: INTERVENTIONAL RADIOLOGY/VASCULAR | Age: 43
DRG: 628 | End: 2023-03-06
Attending: INTERNAL MEDICINE
Payer: MEDICARE

## 2023-03-06 LAB
ANION GAP SERPL CALC-SCNC: 9 MMOL/L (ref 5–15)
BUN SERPL-MCNC: 56 MG/DL (ref 6–20)
BUN/CREAT SERPL: 9 (ref 12–20)
CALCIUM SERPL-MCNC: 7.6 MG/DL (ref 8.5–10.1)
CHLORIDE SERPL-SCNC: 96 MMOL/L (ref 97–108)
CO2 SERPL-SCNC: 27 MMOL/L (ref 21–32)
CREAT SERPL-MCNC: 6.3 MG/DL (ref 0.7–1.3)
GLUCOSE BLD STRIP.AUTO-MCNC: 113 MG/DL (ref 65–117)
GLUCOSE BLD STRIP.AUTO-MCNC: 129 MG/DL (ref 65–117)
GLUCOSE BLD STRIP.AUTO-MCNC: 149 MG/DL (ref 65–117)
GLUCOSE BLD STRIP.AUTO-MCNC: 190 MG/DL (ref 65–117)
GLUCOSE SERPL-MCNC: 198 MG/DL (ref 65–100)
MAGNESIUM SERPL-MCNC: 2.6 MG/DL (ref 1.6–2.4)
POTASSIUM SERPL-SCNC: 3.8 MMOL/L (ref 3.5–5.1)
SERVICE CMNT-IMP: ABNORMAL
SERVICE CMNT-IMP: NORMAL
SODIUM SERPL-SCNC: 132 MMOL/L (ref 136–145)

## 2023-03-06 PROCEDURE — 80048 BASIC METABOLIC PNL TOTAL CA: CPT

## 2023-03-06 PROCEDURE — 74011250637 HC RX REV CODE- 250/637: Performed by: INTERNAL MEDICINE

## 2023-03-06 PROCEDURE — 74011636637 HC RX REV CODE- 636/637: Performed by: GENERAL ACUTE CARE HOSPITAL

## 2023-03-06 PROCEDURE — 74011000250 HC RX REV CODE- 250: Performed by: STUDENT IN AN ORGANIZED HEALTH CARE EDUCATION/TRAINING PROGRAM

## 2023-03-06 PROCEDURE — 02HV33Z INSERTION OF INFUSION DEVICE INTO SUPERIOR VENA CAVA, PERCUTANEOUS APPROACH: ICD-10-PCS | Performed by: STUDENT IN AN ORGANIZED HEALTH CARE EDUCATION/TRAINING PROGRAM

## 2023-03-06 PROCEDURE — 36415 COLL VENOUS BLD VENIPUNCTURE: CPT

## 2023-03-06 PROCEDURE — 74011250636 HC RX REV CODE- 250/636: Performed by: STUDENT IN AN ORGANIZED HEALTH CARE EDUCATION/TRAINING PROGRAM

## 2023-03-06 PROCEDURE — 74011000250 HC RX REV CODE- 250: Performed by: GENERAL ACUTE CARE HOSPITAL

## 2023-03-06 PROCEDURE — 83735 ASSAY OF MAGNESIUM: CPT

## 2023-03-06 PROCEDURE — 82962 GLUCOSE BLOOD TEST: CPT

## 2023-03-06 PROCEDURE — 74011250636 HC RX REV CODE- 250/636: Performed by: INTERNAL MEDICINE

## 2023-03-06 PROCEDURE — 90935 HEMODIALYSIS ONE EVALUATION: CPT

## 2023-03-06 PROCEDURE — 77001 FLUOROGUIDE FOR VEIN DEVICE: CPT

## 2023-03-06 PROCEDURE — 74011000258 HC RX REV CODE- 258: Performed by: INTERNAL MEDICINE

## 2023-03-06 PROCEDURE — 77010033678 HC OXYGEN DAILY

## 2023-03-06 PROCEDURE — 99231 SBSQ HOSP IP/OBS SF/LOW 25: CPT

## 2023-03-06 PROCEDURE — 74011000250 HC RX REV CODE- 250: Performed by: INTERNAL MEDICINE

## 2023-03-06 PROCEDURE — 65270000046 HC RM TELEMETRY

## 2023-03-06 PROCEDURE — 74011636637 HC RX REV CODE- 636/637: Performed by: INTERNAL MEDICINE

## 2023-03-06 RX ORDER — LIDOCAINE HYDROCHLORIDE 20 MG/ML
18 INJECTION, SOLUTION INFILTRATION; PERINEURAL ONCE
Status: COMPLETED | OUTPATIENT
Start: 2023-03-06 | End: 2023-03-06

## 2023-03-06 RX ORDER — SODIUM CHLORIDE 0.9 % (FLUSH) 0.9 %
5-40 SYRINGE (ML) INJECTION EVERY 8 HOURS
Status: DISCONTINUED | OUTPATIENT
Start: 2023-03-06 | End: 2023-03-13 | Stop reason: HOSPADM

## 2023-03-06 RX ORDER — HEPARIN SODIUM 200 [USP'U]/100ML
400 INJECTION, SOLUTION INTRAVENOUS ONCE
Status: COMPLETED | OUTPATIENT
Start: 2023-03-06 | End: 2023-03-06

## 2023-03-06 RX ORDER — SODIUM CHLORIDE 9 MG/ML
25 INJECTION, SOLUTION INTRAVENOUS AS NEEDED
Status: DISCONTINUED | OUTPATIENT
Start: 2023-03-06 | End: 2023-03-09

## 2023-03-06 RX ORDER — SODIUM CHLORIDE 0.9 % (FLUSH) 0.9 %
5-40 SYRINGE (ML) INJECTION AS NEEDED
Status: DISCONTINUED | OUTPATIENT
Start: 2023-03-06 | End: 2023-03-13 | Stop reason: HOSPADM

## 2023-03-06 RX ADMIN — OXYCODONE 5 MG: 5 TABLET ORAL at 20:07

## 2023-03-06 RX ADMIN — PANTOPRAZOLE SODIUM 40 MG: 40 TABLET, DELAYED RELEASE ORAL at 08:13

## 2023-03-06 RX ADMIN — AMLODIPINE BESYLATE 10 MG: 5 TABLET ORAL at 08:13

## 2023-03-06 RX ADMIN — OXYCODONE 5 MG: 5 TABLET ORAL at 14:23

## 2023-03-06 RX ADMIN — CARVEDILOL 12.5 MG: 12.5 TABLET, FILM COATED ORAL at 07:37

## 2023-03-06 RX ADMIN — ROSUVASTATIN CALCIUM 40 MG: 40 TABLET, FILM COATED ORAL at 21:22

## 2023-03-06 RX ADMIN — SODIUM CHLORIDE, PRESERVATIVE FREE 10 ML: 5 INJECTION INTRAVENOUS at 21:29

## 2023-03-06 RX ADMIN — SODIUM CHLORIDE, PRESERVATIVE FREE 10 ML: 5 INJECTION INTRAVENOUS at 05:56

## 2023-03-06 RX ADMIN — Medication 3 UNITS: at 07:41

## 2023-03-06 RX ADMIN — SUCRALFATE 1 G: 1 TABLET ORAL at 07:37

## 2023-03-06 RX ADMIN — DULOXETINE 60 MG: 30 CAPSULE, DELAYED RELEASE ORAL at 08:13

## 2023-03-06 RX ADMIN — SEVELAMER CARBONATE 1600 MG: 800 TABLET, FILM COATED ORAL at 16:12

## 2023-03-06 RX ADMIN — SODIUM CHLORIDE 1 G: 900 INJECTION INTRAVENOUS at 17:35

## 2023-03-06 RX ADMIN — SUCRALFATE 1 G: 1 TABLET ORAL at 11:09

## 2023-03-06 RX ADMIN — INSULIN GLARGINE 10 UNITS: 100 INJECTION, SOLUTION SUBCUTANEOUS at 21:28

## 2023-03-06 RX ADMIN — SEVELAMER CARBONATE 1600 MG: 800 TABLET, FILM COATED ORAL at 11:09

## 2023-03-06 RX ADMIN — HEPARIN SODIUM 5000 UNITS: 5000 INJECTION INTRAVENOUS; SUBCUTANEOUS at 08:12

## 2023-03-06 RX ADMIN — HYDRALAZINE HYDROCHLORIDE 100 MG: 50 TABLET, FILM COATED ORAL at 21:23

## 2023-03-06 RX ADMIN — SODIUM CHLORIDE, PRESERVATIVE FREE 10 ML: 5 INJECTION INTRAVENOUS at 21:28

## 2023-03-06 RX ADMIN — OXYCODONE 5 MG: 5 TABLET ORAL at 07:37

## 2023-03-06 RX ADMIN — LOSARTAN POTASSIUM 50 MG: 50 TABLET, FILM COATED ORAL at 08:13

## 2023-03-06 RX ADMIN — SEVELAMER CARBONATE 1600 MG: 800 TABLET, FILM COATED ORAL at 07:37

## 2023-03-06 RX ADMIN — HEPARIN SODIUM IN SODIUM CHLORIDE 200 UNITS: 200 INJECTION INTRAVENOUS at 13:57

## 2023-03-06 RX ADMIN — FUROSEMIDE 40 MG: 40 TABLET ORAL at 17:35

## 2023-03-06 RX ADMIN — ACETAMINOPHEN 325MG 650 MG: 325 TABLET ORAL at 11:09

## 2023-03-06 RX ADMIN — HEPARIN SODIUM 5000 UNITS: 5000 INJECTION INTRAVENOUS; SUBCUTANEOUS at 21:28

## 2023-03-06 RX ADMIN — FUROSEMIDE 40 MG: 40 TABLET ORAL at 08:13

## 2023-03-06 RX ADMIN — HYDRALAZINE HYDROCHLORIDE 100 MG: 50 TABLET, FILM COATED ORAL at 16:12

## 2023-03-06 RX ADMIN — PREGABALIN 75 MG: 50 CAPSULE ORAL at 21:27

## 2023-03-06 RX ADMIN — CARVEDILOL 12.5 MG: 12.5 TABLET, FILM COATED ORAL at 16:12

## 2023-03-06 RX ADMIN — SUCRALFATE 1 G: 1 TABLET ORAL at 16:12

## 2023-03-06 RX ADMIN — TAMSULOSIN HYDROCHLORIDE 0.4 MG: 0.4 CAPSULE ORAL at 08:13

## 2023-03-06 RX ADMIN — HYDRALAZINE HYDROCHLORIDE 100 MG: 50 TABLET, FILM COATED ORAL at 08:13

## 2023-03-06 RX ADMIN — LIDOCAINE HYDROCHLORIDE 360 MG: 20 INJECTION, SOLUTION INFILTRATION; PERINEURAL at 13:57

## 2023-03-06 RX ADMIN — FINASTERIDE 5 MG: 5 TABLET, FILM COATED ORAL at 08:13

## 2023-03-06 RX ADMIN — ASPIRIN 81 MG: 81 TABLET, CHEWABLE ORAL at 08:13

## 2023-03-06 RX ADMIN — SUCRALFATE 1 G: 1 TABLET ORAL at 21:28

## 2023-03-06 RX ADMIN — SODIUM CHLORIDE, PRESERVATIVE FREE 10 ML: 5 INJECTION INTRAVENOUS at 14:23

## 2023-03-06 NOTE — PROGRESS NOTES
Nephrology Progress Note  [facility] / [address] [place] [address] No  [place], [address]  Phone - [phone]  Fax - [phone]                 Patient: Saturnino Gil                   YOB: 1980        Date- 3/6/2023                      Admit Date: 3/4/2023  CC: Follow up for esrd          IMPRESSION & PLAN:   ESRD- on hd mwf at [facility]  Hyponatremia  Fluid overlaod  Anemia of ckd  Pulmonary edema  hypertension  Uncontrolled DM  H/o Urinary retention requiring hansen cath in pasts  Type 1 diabetes      PLAN-  Hd today  Ultrafiltration on Tuesday  Continue hd mwf  Follow bmp     Subjective: Interval History:   -  bp stable  Sob improved- c/o right sided abdo pain-    Objective:   Vitals:    03/05/23 2200 03/06/23 0000 03/06/23 0404 03/06/23 0800   BP: (!) 166/92 119/65 (!) 145/80    Pulse: 83 74 67 71   Resp:  15 14    Temp:  97.6 °F (36.4 °C) 97.8 °F (36.6 °C)    SpO2: 93% 90% 93%    Weight:          03/05 0701 - 03/06 0700  In: -   Out: 2805     Last 3 Recorded Weights in this Encounter    03/05/23 0637   Weight: 76.2 kg (167 lb 15.9 oz)        Physical exam:    GEN:  NAD  NECK:  Supple, no thyromegaly  RESP: Clear  b/l, no  wheezing,   CVS: RRR,S1,S2   NEURO: non focal, normal speech  EXT: Edema +nt     Left arm avg +    Chart reviewed. Pertinent Notes reviewed.      Data Review :  Recent Labs     03/06/23  0430 03/05/23  0406 03/05/23  0011 03/04/23 2021 03/04/23  1537   * 128* 132* 128* 128*   K 3.8 4.3 3.9 3.9 4.6   CL 96* 96* 98 95* 96*   CO2 27 23 23 22 21   BUN 56* 70* 68* 68* 63*   CREA 6.30* 7.23* 7.07* 7.16* 6.88*   * 94 184* 377* 617*   CA 7.6* 8.0* 8.3* 8.1* 8.2*   MG 2.6* 2.8*  --  2.6* 2.7*   PHOS  --   --   --   --  7.8*     Recent Labs     03/05/23  0011 03/04/23  1403   WBC 6.8 7.1   HGB 11.9* 11.9*   HCT 39.3 39.8    181     No results for input(s): FE, TIBC, PSAT, FERR in the last 72 hours.    Lab Results   Component Value Date/Time    Hemoglobin A1c 12.3 (H) 03/04/2023 03:37 PM    Hemoglobin A1c 10.1 (H) 11/29/2022 08:48 PM    Hemoglobin A1c 7.2 (H) 09/26/2022 09:34 AM      Lab Results   Component Value Date/Time    Microalbumin/Creat ratio (mg/g creat) 11,439 (H) 04/01/2021 10:00 AM    Microalbumin,urine random 151.00 04/01/2021 10:00 AM     US Results (most recent):  Medication list  reviewed  Current Facility-Administered Medications   Medication Dose Route Frequency    insulin lispro (HUMALOG) injection   SubCUTAneous AC&HS    glucose chewable tablet 16 g  4 Tablet Oral PRN    glucagon (GLUCAGEN) injection 1 mg  1 mg IntraMUSCular PRN    dextrose 10% infusion 0-250 mL  0-250 mL IntraVENous PRN    cloNIDine HCL (CATAPRES) tablet 0.1 mg  0.1 mg Oral Q6H PRN    insulin regular (NOVOLIN R, HUMULIN R) 100 Units in 0.9% sodium chloride 100 mL infusion  0-50 Units/hr IntraVENous TITRATE    sodium chloride (NS) flush 5-40 mL  5-40 mL IntraVENous Q8H    sodium chloride (NS) flush 5-40 mL  5-40 mL IntraVENous PRN    acetaminophen (TYLENOL) tablet 650 mg  650 mg Oral Q6H PRN    Or    acetaminophen (TYLENOL) suppository 650 mg  650 mg Rectal Q6H PRN    polyethylene glycol (MIRALAX) packet 17 g  17 g Oral DAILY PRN    ondansetron (ZOFRAN ODT) tablet 4 mg  4 mg Oral Q8H PRN    Or    ondansetron (ZOFRAN) injection 4 mg  4 mg IntraVENous Q6H PRN    amLODIPine (NORVASC) tablet 10 mg  10 mg Oral DAILY    aspirin chewable tablet 81 mg  81 mg Oral DAILY    carvediloL (COREG) tablet 12.5 mg  12.5 mg Oral BID WITH MEALS    cloNIDine (CATAPRES) 0.1 mg/24 hr patch 1 Patch  1 Patch TransDERmal Q7D    DULoxetine (CYMBALTA) capsule 60 mg  60 mg Oral DAILY    finasteride (PROSCAR) tablet 5 mg  5 mg Oral DAILY    hydrALAZINE (APRESOLINE) tablet 100 mg  100 mg Oral TID    losartan (COZAAR) tablet 50 mg  50 mg Oral DAILY    pantoprazole (PROTONIX) tablet 40 mg  40 mg Oral DAILY    pregabalin (LYRICA) capsule 75 mg  75 mg Oral QHS    rosuvastatin (CRESTOR) tablet 40 mg  40 mg Oral QHS    sevelamer carbonate (RENVELA) tab 1,600 mg  1,600 mg Oral TID WITH MEALS    sucralfate (CARAFATE) tablet 1 g  1 g Oral AC&HS    tamsulosin (FLOMAX) capsule 0.4 mg  0.4 mg Oral DAILY    heparin (porcine) injection 5,000 Units  5,000 Units SubCUTAneous Q12H    oxyCODONE IR (ROXICODONE) tablet 5 mg  5 mg Oral Q6H PRN    insulin glargine (LANTUS) injection 10 Units  10 Units SubCUTAneous QHS    cefTRIAXone (ROCEPHIN) 1 g in 0.9% sodium chloride (MBP/ADV) 50 mL MBP  1 g IntraVENous Q24H    furosemide (LASIX) tablet 40 mg  40 mg Oral BID        Zuly Walsh MD  3/6/2023

## 2023-03-06 NOTE — PROGRESS NOTES
Hospitalist Progress Note    NAME: Bogdan Vang   :  1980   MRN:  [id number]       Assessment / Plan:    Acute on chronic hypoxic respiratory failure, chronically on 2 L/M  S/s fluid overload in the setting of missing HD  Pulmonary edema and B/L pleural effusion, L>right  Nephrology consulted. HD as per nephrology  Continue oxygen and wean as tolerated     Uncontrolled blood sugar  Type I DM uncontrolled  Has elevated blood sugar without anion gap. VBG shows acidosis. off insulin drip  lantus 10 units   A1c 12     Right back Pain:  Likely s/s pleural effusion vs right pyelonephritis. UA neg  renal/bladder US neg     Hypertension:  Resume home medications- coreg, amlodipine, hydralazine, losartan      ESRD on HD  Nephrology consulted  HD as per nephrology     Suspected UTI  UA is neg. Patient reports he makes little urine. Reports that he needs to drink more water to pee. Will start ceftriaxone after UA is collected. enal/bladder US neg  Will get CT abdomen and pelvis if patient continues to have right flank/back pain after HD and fluid removal.            Code Status: full code  Surrogate Decision Maker:mother   DVT Prophylaxis: sc heparin  18.5 - 24.9 Normal weight / Body mass index is 24.81 kg/m². Recommended Disposition: Home w/Family  Anticipated Discharge Date:  >48 hours   SHAE Barriers: clinical improvement, wean off O2       Subjective:     Discussed with RN events overnight.    Right flank pain improving  No N/V  Afebrile  No diarrhea  Feels he needs to pee but bladder scan is <10 ml      Review of Systems:  Symptom Y/N Comments  Symptom Y/N Comments   Fever/Chills    Chest Pain     Poor Appetite    Edema     Cough    Abdominal Pain     Sputum    Joint Pain     SOB/JOHNSTON    Pruritis/Rash     Nausea/vomit    Tolerating PT/OT     Diarrhea    Tolerating Diet     Constipation    Other       PO intake: No data found. Wt Readings from Last 10 Encounters:   03/05/23 76.2 kg (167 lb 15.9 oz)   01/10/23 85.3 kg (188 lb)   12/29/22 78 kg (171 lb 15.3 oz)   12/27/22 78 kg (171 lb 15.3 oz)   12/21/22 78 kg (171 lb 15.3 oz)   12/06/22 80.6 kg (177 lb 11.1 oz)   11/02/22 71.8 kg (158 lb 6.4 oz)   10/25/22 69.2 kg (152 lb 8.9 oz)   09/28/22 75.8 kg (167 lb 3.2 oz)   09/13/22 72.1 kg (159 lb)       Objective:     VITALS:   Last 24hrs VS reviewed since prior progress note.  Most recent are:  Patient Vitals for the past 24 hrs:   Temp Pulse Resp BP SpO2   03/05/23 2200 -- 83 -- (!) 166/92 93 %   03/05/23 2146 -- 79 -- (!) 156/85 92 %   03/05/23 2001 98.1 °F (36.7 °C) 78 14 (!) 159/82 (!) 89 %   03/05/23 1800 -- 79 11 (!) 148/90 93 %   03/05/23 1736 -- 81 -- (!) 170/90 --   03/05/23 1705 -- 86 -- (!) 187/90 --   03/05/23 1638 -- 84 -- (!) 167/85 --   03/05/23 1600 -- 88 16 -- 91 %   03/05/23 1315 -- 81 18 (!) 172/82 93 %   03/05/23 1230 -- 79 11 (!) 153/76 96 %   03/05/23 1216 97.3 °F (36.3 °C) 79 17 129/88 (!) 89 %   03/05/23 1200 -- 78 11 131/83 96 %   03/05/23 1145 -- 77 18 132/78 96 %   03/05/23 1130 98.2 °F (36.8 °C) 76 14 129/79 96 %   03/05/23 1115 -- 75 17 136/75 96 %   03/05/23 1100 -- 75 18 135/77 96 %   03/05/23 1045 -- 74 22 116/72 93 %   03/05/23 1030 -- 75 15 137/75 96 %   03/05/23 1015 -- 74 20 138/79 96 %   03/05/23 1000 -- 74 16 139/83 93 %   03/05/23 0945 -- 73 12 133/82 92 %   03/05/23 0930 -- 73 9 119/67 92 %   03/05/23 0915 -- 72 10 129/78 94 %   03/05/23 0900 -- 72 14 (!) 143/86 91 %   03/05/23 0855 97.3 °F (36.3 °C) -- -- -- --   03/05/23 0815 -- 74 22 134/81 93 %   03/05/23 0716 97.4 °F (36.3 °C) 70 16 (!) 141/61 95 %   03/05/23 0339 98 °F (36.7 °C) 63 17 90/67 --       Intake/Output Summary (Last 24 hours) at 3/6/2023 0001  Last data filed at 3/5/2023 1216  Gross per 24 hour   Intake --   Output 2805 ml   Net -2805 ml        I had a face to face encounter, and independently examined this patient as outlined below:    PHYSICAL EXAM:  General:    No distress     HEENT: Atraumatic, anicteric sclerae, pink conjunctivae, MMM  Neck:  Supple, symmetrical  Lungs:   CTA. No Wheezing/Rhonchi. No rales. No tenderness. No Accessory muscle use. CVS:   Regular rhythm. No murmur. No JVD. L UE AVF w + thrill   GI/:   Soft. NT. ND. BS normal. Mild R CVAT  Extremities: No edema. No cyanosis. No clubbing. Skin:     Not pale. Not Jaundiced. No rashes   Psych:  Good insight. Not depressed. Not anxious or agitated. Neurologic: Alert and oriented X 4. EOMs intact. No facial asymmetry. No slurred speech. Symmetrical strength, Sensation grossly intact. Labs     I reviewed today's most current labs and imaging studies. Pertinent labs include:  Recent Labs     03/05/23  0011 03/04/23  1403   WBC 6.8 7.1   HGB 11.9* 11.9*   HCT 39.3 39.8    181     Recent Labs     03/05/23  0406 03/05/23  0011 03/04/23 2021 03/04/23  1537 03/04/23  1403   * 132* 128* 128* 126*   K 4.3 3.9 3.9 4.6 4.2   CL 96* 98 95* 96* 95*   CO2 23 23 22 21 22   GLU 94 184* 377* 617* 595*   BUN 70* 68* 68* 63* 60*   CREA 7.23* 7.07* 7.16* 6.88* 6.80*   CA 8.0* 8.3* 8.1* 8.2* 7.8*   MG 2.8*  --  2.6* 2.7*  --    PHOS  --   --   --  7.8*  --    ALB  --   --   --   --  2.6*   TBILI  --   --   --   --  0.5   ALT  --   --   --   --  16     US RETROPERITONEUM LTD    Result Date: 3/4/2023  Unremarkable Renal Sonogram.     XR CHEST PORT    Result Date: 3/4/2023  Pulmonary edema with moderate left and small right pleural effusions, and associated bibasilar airspace disease. No results found. 10/16/22    ECHO ADULT COMPLETE 10/27/2022 10/27/2022    Interpretation Summary    Left Ventricle: Normal left ventricular systolic function with a visually estimated EF of 45 - 50%. Left ventricle size is normal. Normal wall thickness. Normal wall motion. Aortic Valve: Tricuspid valve.     Signed by: Gertrudis Medrano MD on 10/27/2022  8:23 AM All Micro Results       None              Current Medications:     Current Facility-Administered Medications:     insulin lispro (HUMALOG) injection, , SubCUTAneous, AC&HS, Luly Morton MD, 2 Units at 03/05/23 2151    glucose chewable tablet 16 g, 4 Tablet, Oral, PRN, Luly Morton MD    glucagon (GLUCAGEN) injection 1 mg, 1 mg, IntraMUSCular, PRN, Luly Morton MD    dextrose 10% infusion 0-250 mL, 0-250 mL, IntraVENous, PRN, Luly Morton MD    cloNIDine HCL (CATAPRES) tablet 0.1 mg, 0.1 mg, Oral, Q6H PRN, Felix Morton MD, 0.1 mg at 03/05/23 2248    insulin regular (NOVOLIN R, HUMULIN R) 100 Units in 0.9% sodium chloride 100 mL infusion, 0-50 Units/hr, IntraVENous, TITRATE, Ginny Yi MD, Stopped at 03/05/23 0233    sodium chloride (NS) flush 5-40 mL, 5-40 mL, IntraVENous, Q8H, Keila Jaime MD, 10 mL at 03/05/23 2153    sodium chloride (NS) flush 5-40 mL, 5-40 mL, IntraVENous, PRN, Ginny Yi MD    acetaminophen (TYLENOL) tablet 650 mg, 650 mg, Oral, Q6H PRN **OR** acetaminophen (TYLENOL) suppository 650 mg, 650 mg, Rectal, Q6H PRN, Ginny Yi MD    polyethylene glycol (MIRALAX) packet 17 g, 17 g, Oral, DAILY PRN, Ginny Yi MD    ondansetron (ZOFRAN ODT) tablet 4 mg, 4 mg, Oral, Q8H PRN **OR** ondansetron (ZOFRAN) injection 4 mg, 4 mg, IntraVENous, Q6H PRN, Keila Oates MD    amLODIPine (NORVASC) tablet 10 mg, 10 mg, Oral, DAILY, Keila Jaime MD, 10 mg at 03/05/23 1327    aspirin chewable tablet 81 mg, 81 mg, Oral, DAILY, Keila Jaime MD    carvediloL (COREG) tablet 12.5 mg, 12.5 mg, Oral, BID WITH MEALS, Keila Jaime MD, 12.5 mg at 03/05/23 1705    cloNIDine (CATAPRES) 0.1 mg/24 hr patch 1 Patch, 1 Patch, TransDERmal, Q7D, Keila Jaime MD    DULoxetine (CYMBALTA) capsule 60 mg, 60 mg, Oral, DAILY, Keila Jaime MD, 60 mg at 03/05/23 0839    finasteride (PROSCAR) tablet 5 mg, 5 mg, Oral, DAILY, Ginny Yi MD, 5 mg at 03/05/23 9484    hydrALAZINE (APRESOLINE) tablet 100 mg, 100 mg, Oral, TID, Daren Buitrago MD, 100 mg at 03/05/23 2151    losartan (COZAAR) tablet 50 mg, 50 mg, Oral, DAILY, Daren Buitrago MD, 50 mg at 03/05/23 1327    pantoprazole (PROTONIX) tablet 40 mg, 40 mg, Oral, DAILY, Daren Buitrago MD, 40 mg at 03/05/23 5693    pregabalin (LYRICA) capsule 75 mg, 75 mg, Oral, QHS, Daren Buitrago MD, 75 mg at 03/05/23 2151    rosuvastatin (CRESTOR) tablet 40 mg, 40 mg, Oral, QHS, Daren Buitrago MD, 40 mg at 03/05/23 2151    sevelamer carbonate (RENVELA) tab 1,600 mg, 1,600 mg, Oral, TID WITH MEALS, Keila Jaime MD, 1,600 mg at 03/05/23 1701    sucralfate (CARAFATE) tablet 1 g, 1 g, Oral, AC&HS, Daren Buitrago MD, 1 g at 03/05/23 2151    tamsulosin (FLOMAX) capsule 0.4 mg, 0.4 mg, Oral, DAILY, Daren Buitrago MD, 0.4 mg at 03/05/23 3656    heparin (porcine) injection 5,000 Units, 5,000 Units, SubCUTAneous, Q12H, Keila Jaime MD, 5,000 Units at 03/05/23 2151    oxyCODONE IR (ROXICODONE) tablet 5 mg, 5 mg, Oral, Q6H PRN, Daren Buitrago MD, 5 mg at 03/05/23 2248    insulin glargine (LANTUS) injection 10 Units, 10 Units, SubCUTAneous, QHS, Daren Buitrago MD, 10 Units at 03/05/23 2152    cefTRIAXone (ROCEPHIN) 1 g in 0.9% sodium chloride (MBP/ADV) 50 mL MBP, 1 g, IntraVENous, Q24H, Keila Jaime MD, Last Rate: 100 mL/hr at 03/05/23 1736, 1 g at 03/05/23 1736    furosemide (LASIX) tablet 40 mg, 40 mg, Oral, BID, Austin Lima MD, 40 mg at 03/05/23 1736     Procedures: see electronic medical records for all procedures/Xrays and details which were not copied into this note but were reviewed prior to creation of Plan.     Reviewed most current lab test results and cultures  YES  Reviewed most current radiology test results   YES  Review and summation of old records today    NO  Reviewed patient's current orders and MAR    YES  PMH/SH reviewed - no change compared to H&P  ________________________________________________________________________  Care Plan discussed with:    Comments   Patient x    Family      RN x    Care Manager     Consultant                        Multidiciplinary team rounds were held today with , nursing, pharmacist and clinical coordinator. Patient's plan of care was discussed; medications were reviewed and discharge planning was addressed.      ________________________________________________________________________  Total NON critical care TIME:   33  Minutes    Total CRITICAL CARE TIME Spent:   Minutes non procedure based      Comments   >50% of visit spent in counseling and coordination of care x     This includes time during multidisciplinary rounds if indicated above   ________________________________________________________________________  Nichelle Mcmullen MD

## 2023-03-06 NOTE — PROGRESS NOTES
Transition of Care Plan:    RUR: 27%-High Risk  Disposition: Home with family support    Follow up appointments: PCP, Specialist  DME needed:  has rollator,Home oxygen - 2L- company unknown  Transportation at Discharge: Mother to provide transportation  [address] or means to access home: Mother  has access  IM Medicare Letter: 2nd IM Letter at d/c  Caregiver Contact: Mother- Nereida Colón- [phone]  Discharge Caregiver contacted prior to discharge? If pt desires  Care Conference needed?:    no                 CM introduced self and role to pt at bedside  verified pt demographics, insurance info,emergency contact and ACD. [address]ses [address] [place] [address]. HH in the past, No SNF. Reason for Admission:   Acute on chronic hypoxic respiratory failure, chronically on 2 L/M  S/s fluid overload in the setting of missing HD  Pulmonary edema and B/L pleural effusion, L>right                 RUR Score:     27%- High Risk      PCP: First and Last name:  Jn Holman MD     Name of Practice:   Are you a current patient: Yes/No:  yes   Approximate date of last visit:  2/23   Can you do a virtual visit with your PCP:              Resources/supports as identified by patient/family:   Has supportive Mother                Top Challenges facing patient (as identified by patient/family and CM): Finances/Medication cost?                    Transportation? Mother or medicaid provides transportation               Support system or lack thereof? Has supportive mother  Living arrangements? Lives with mother at [facility]- one level home with 6 steps to enter, ramp attached  Self-care/ADLs/Cognition? Pt alert and oriented. Pt independent with ADL's, uses a rollator for ambulation.   Has Home oxygen- 2L arranged at [facility]          Current Advanced Directive/Advance Care Plan:  Full Code      Healthcare Decision Maker:   Click here to complete [address] including selection of the Healthcare Decision Maker Relationship (ie \"Primary\")      Primary Decision Maker: Марина Torres - Mother - [phone]    Payor Source Payor: [place] MEDICARE / Plan: Lenord Lefort / Product Type: Medicare /                             Plan for utilizing home health:      none                 Transition of Care Plan:           Home with family support           Care Management Interventions  Mode of Transport at Discharge:  Other (see comment)  Transition of Care Consult (CM Consult): Discharge Planning  Discharge Durable Medical Equipment: No  Support Systems: Parent(s)  Confirm Follow Up Transport: Other (see comment)  Discharge Location  Patient Expects to be Discharged to[de-identified] Home with family assistance    [address] [facility]  Phone: [phone]

## 2023-03-06 NOTE — PROGRESS NOTES
Bedside and Verbal shift change report given to Tamia Durbin (oncoming nurse) by Tyshawn Ortiz RN (offgoing nurse).  Report included the following information SBAR, Kardex, Intake/Output, Med Rec Status, and Cardiac Rhythm SR .

## 2023-03-06 NOTE — PROGRESS NOTES
TRANSFER - OUT REPORT:    Verbal report given to ASAD Auguste(name) on Claudia Ramirez  being [facility] for routine progression of care       Report consisted of patients Situation, Background, Assessment and   Recommendations(SBAR). Information from the following report(s) Procedure Summary was reviewed with the receiving nurse. Lines:   Peripheral IV 03/04/23 Right Antecubital (Active)   Site Assessment Clean, dry, & intact 03/06/23 1200   Phlebitis Assessment 0 03/06/23 1200   Infiltration Assessment 0 03/06/23 1200   Dressing Status Clean, dry, & intact 03/06/23 1200   Dressing Type Tape;Transparent 03/06/23 1200   Hub Color/Line Status Green;Capped 03/06/23 1200   Action Taken Open ports on tubing capped 03/06/23 1200   Alcohol Cap Used Yes 03/06/23 1200       Peripheral IV 27/82/69 Right Basilic (Active)   Site Assessment Clean, dry, & intact 03/06/23 1200   Phlebitis Assessment 0 03/06/23 1200   Infiltration Assessment 0 03/06/23 1200   Dressing Status Clean, dry, & intact 03/06/23 1200   Dressing Type Tape;Transparent 03/06/23 1200   Hub Color/Line Status Green;Capped 03/06/23 1200   Action Taken Open ports on tubing capped 03/06/23 1200   Alcohol Cap Used Yes 03/06/23 1200        Opportunity for questions and clarification was provided.       Patient transported with:   O2 @ 4 liters

## 2023-03-06 NOTE — PROGRESS NOTES
Problem: Risk for Spread of Infection  Goal: Prevent transmission of infectious organism to others  Description: Prevent the transmission of infectious organisms to other patients, staff members, and visitors. 3/5/2023 2122 by Ramirez Duncan RN  Outcome: Progressing Towards Goal  3/5/2023 2121 by Ramirez Duncan RN  Outcome: Progressing Towards Goal     Problem: Patient Education:  Go to Education Activity  Goal: Patient/Family Education  3/5/2023 2122 by Ramirez Duncan RN  Outcome: Progressing Towards Goal  3/5/2023 2121 by Ramirez Duncan RN  Outcome: Progressing Towards Goal     Problem: Falls - Risk of  Goal: *Absence of Falls  Description: Document Machelle Patrick Fall Risk and appropriate interventions in the flowsheet. 3/5/2023 2122 by Ramirez Duncan RN  Outcome: Progressing Towards Goal  Note: Fall Risk Interventions:            Medication Interventions: Bed/chair exit alarm, Patient to call before getting OOB, Teach patient to arise slowly                3/5/2023 2121 by Ramirez Duncan RN  Outcome: Progressing Towards Goal  Note: Fall Risk Interventions:            Medication Interventions: Bed/chair exit alarm, Patient to call before getting OOB, Teach patient to arise slowly                   Problem: Patient Education: Go to Patient Education Activity  Goal: Patient/Family Education  3/5/2023 2122 by Ramirez Duncan RN  Outcome: Progressing Towards Goal  3/5/2023 2121 by Ramirez Duncan RN  Outcome: Progressing Towards Goal     Problem: Diabetes Self-Management  Goal: *Disease process and treatment process  Description: Define diabetes and identify own type of diabetes; list 3 options for treating diabetes.   3/5/2023 2122 by Ramirez Duncan RN  Outcome: Progressing Towards Goal  3/5/2023 2121 by Ramirez Duncan RN  Outcome: Progressing Towards Goal  Goal: *Incorporating nutritional management into lifestyle  Description: Describe effect of type, amount and timing of food on blood glucose; list 3 methods for planning meals. 3/5/2023 2122 by Lisseth Mackay RN  Outcome: Progressing Towards Goal  3/5/2023 2121 by Lisseth Mackay RN  Outcome: Progressing Towards Goal  Goal: *Incorporating physical activity into lifestyle  Description: State effect of exercise on blood glucose levels. 3/5/2023 2122 by Lisseth Mackay RN  Outcome: Progressing Towards Goal  3/5/2023 2121 by Lisseth Mackay RN  Outcome: Progressing Towards Goal  Goal: *Developing strategies to promote health/change behavior  Description: Define the ABC's of diabetes; identify appropriate screenings, schedule and personal plan for screenings. 3/5/2023 2122 by Lisseth Mackay RN  Outcome: Progressing Towards Goal  3/5/2023 2121 by Lisseth Mackay RN  Outcome: Progressing Towards Goal  Goal: *Using medications safely  Description: State effect of diabetes medications on diabetes; name diabetes medication taking, action and side effects. 3/5/2023 2122 by Lisseth Mackay RN  Outcome: Progressing Towards Goal  3/5/2023 2121 by Lisseth Mackay RN  Outcome: Progressing Towards Goal  Goal: *Monitoring blood glucose, interpreting and using results  Description: Identify recommended blood glucose targets  and personal targets. 3/5/2023 2122 by Lisseth Mackay RN  Outcome: Progressing Towards Goal  3/5/2023 2121 by Lisseth Mackay RN  Outcome: Progressing Towards Goal  Goal: *Prevention, detection, treatment of acute complications  Description: List symptoms of hyper- and hypoglycemia; describe how to treat low blood sugar and actions for lowering  high blood glucose level. 3/5/2023 2122 by Lisseth Mackay RN  Outcome: Progressing Towards Goal  3/5/2023 2121 by Lisseth Mackay RN  Outcome: Progressing Towards Goal  Goal: *Prevention, detection and treatment of chronic complications  Description: Define the natural course of diabetes and describe the relationship of blood glucose levels to long term complications of diabetes.   3/5/2023 2122 by Alayna Branch RN  Outcome: Progressing Towards Goal  3/5/2023 2121 by Alayna Branch RN  Outcome: Progressing Towards Goal  Goal: *Developing strategies to address psychosocial issues  Description: Describe feelings about living with diabetes; identify support needed and support network  3/5/2023 2122 by Alayna Branch RN  Outcome: Progressing Towards Goal  3/5/2023 2121 by Alayna Branch RN  Outcome: Progressing Towards Goal  Goal: *Insulin pump training  3/5/2023 2122 by Alayna Branch RN  Outcome: Progressing Towards Goal  3/5/2023 2121 by Alayna Branch RN  Outcome: Progressing Towards Goal  Goal: *Sick day guidelines  3/5/2023 2122 by Alayna Branch RN  Outcome: Progressing Towards Goal  3/5/2023 2121 by Alayna Branch RN  Outcome: Progressing Towards Goal  Goal: *Patient Specific Goal (EDIT GOAL, INSERT TEXT)  3/5/2023 2122 by Alayna Branch RN  Outcome: Progressing Towards Goal  3/5/2023 2121 by Alayna Branch RN  Outcome: Progressing Towards Goal     Problem: Patient Education: Go to Patient Education Activity  Goal: Patient/Family Education  3/5/2023 2122 by Alayna Branch RN  Outcome: Progressing Towards Goal  3/5/2023 2121 by Alayna Branch RN  Outcome: Progressing Towards Goal

## 2023-03-06 NOTE — DIABETES MGMT
[address] [facility]    CLINICAL NURSE SPECIALIST CONSULT     Initial Presentation   Hakan Anderson is a 38 y.o. male admitted 3/4/2023 after experiencing right flank pain and hyperglycemia. The patient is M,W, F  HD patient and he reportedly missed his Friday ( 3/3/23)dialysis. LAB:Glucose 595. Creatine 6.80. GFR 10. A1c 12.3%  CXR:Study Result    Narrative & Impression   INDICATION: vol overload       EXAM:  AP CHEST RADIOGRAPH     COMPARISON: 12/3/2022     FINDINGS:     AP portable view of the chest demonstrates interval removal of right IJ  permacath. Heart size is likely normal, though partly obscured by bibasilar  airspace disease and moderate left/small right pleural effusions. Mild pulmonary  edema. No pneumothorax. The osseous structures are unremarkable. IMPRESSION  Pulmonary edema with moderate left and small right pleural effusions, and  associated bibasilar airspace disease. CT/MRI:    HX:   Past Medical History:   Diagnosis Date    Bipolar 1 disorder, depressed ([address]     Bipolar disorder ([address]     Chronic kidney disease, stage 3a ([address]     Depression     Diabetes ([address]     DKA, type 1 ([address] 1/27/2013    diagnosed age 23    DKA, type 1 ([address]     H/O noncompliance with medical treatment, presenting hazards to health     MRSA (methicillin resistant staph aureus) culture positive     MRSA (methicillin resistant Staphylococcus aureus)     Face    Noncompliance with medication regimen     Second hand smoke exposure     Seizure ([address]     Seizures ([address] 2006 or 2007    one episode during intermediate        INITIAL DX:   Acute on chronic respiratory failure with hypoxia ([address] [J96.21]     Current Treatment     TX: BP management. ASA. Coreg. Abx. Catapres. Cymbalta. Lasix. Apresolinew. Insulin lostartan. Protonix. Lyrica. Crestor. Crafate.      Consulted by Provider for advanced diabetes nursing assessment and care for:   [] Transitioning off [facility]   [x] Inpatient management strategy  [x] Home management assessment  [] Survival skill education    Hospital Course   Clinical progress has been uncomplicated . Diabetes History   The patient reports a 20 year history of Type 1 diabetes. He has a positive family hx of diabetes (mom & niece). He states today that he sees Dr. My Brewer (endocrinologist) for his diabetes. He no longer uses an insulin pump and is back on insulin injections. He uses 10 units Lantus nightly and sliding scale Humalog with meals. Diabetes-related Medical History  Acute complications  DKA  Neurological complications  Gastroparesis and Peripheral neuropathy  Microvascular disease  Nephropathy  Macrovascular disease  Myocardial infarction    Diabetes Medication History  Key Antihyperglycemic Medications               insulin glargine (LANTUS,BASAGLAR) 100 unit/mL (3 mL) inpn [place] 10 units subcut daily    insulin lispro (HUMALOG) 100 unit/mL kwikpen Administer 10 units before each meal             Diabetes self-management practices:   Eating pattern Deferred     Physical activity pattern Deferred     Monitoring pattern   [x] Testing BGs sufficiently to inform self-management adjustments    Taking medications pattern  [x] Inconsistent administration  [x] Affordable    Overall evaluation:    [x] Not achieving A1c target with drug therapy & self-care practices    Subjective   I was in too much pain to go to dialysis.      Objective   Physical exam  General Normal weight male in no acute distress.  Conversant and cooperative  Neuro  Alert, oriented   Vital Signs Visit Vitals  BP (!) 144/93 (BP 1 Location: Right lower arm, BP Patient Position: At rest)   Pulse 66   Temp 98 °F (36.7 °C)   Resp 13   Wt 76.2 kg (167 lb 15.9 oz)   SpO2 93%   BMI 24.81 kg/m²         Laboratory  Recent Labs     03/06/23  0430 03/05/23  0406 03/05/23  0011 03/04/23  1537 03/04/23  1403   * 94 184*   < > 595*   AGAP 9 9 11   < > 9   WBC  --   --  6.8  --  7.1   CREA 6.30* 7.23* 7.07* < > 6.80*   AST  --   --   --   --  9*   ALT  --   --   --   --  16    < > = values in this interval not displayed. Factors impacting BG management  Factor Dose Comments   Nutrition:  Standard meals     60 grams/meal      Pain PRN acetaminophen    Infection Rocephin    Other:   Kidney function  Liver function     ESRD  AST 9      Blood glucose pattern      Significant diabetes-related events over the past 24-72 hours  Transitioned off glucostabilizer on 3/4/23 with 10 units Lantus    Assessment and Plan   Nursing Diagnosis Risk for unstable blood glucose pattern   Nursing Intervention Domain 5251 Decision-making Support   Nursing Interventions Examined current inpatient diabetes/blood glucose control   Explored factors facilitating and impeding inpatient management  Explored corrective strategies with patient and responsible inpatient provider   Informed patient of rational for insulin strategy while hospitalized       Evaluation   This 38year old male did not achieve BG control prior to admission as evidenced by an A1c of 12.3%. The patient is using 10 units Lantus nightly at home, as well sliding scale Humalog. The patient reports that he takes his basal dose consistently but sometimes skips Humalog. The patient has a hx of many past admissions for DKA. The patient was using the insulin pump but has been taken off and put back on insulin injections by his endocrinologist. He is now ordered is home dose of 10 units Lantus daily and I agree with this strategy.        Orders/Plan   Continue 10 units Lantus  Use normnal sensitivity correction scale with meals and at bedtime      Billing Code(s)   [] 08568 IP subsequent hospital care - 50 minutes   [] [phone] subsequent hospital care - 35 minutes   [x] 27484 IP subsequent hospital care - 25 minutes     Before making these care recommendations, I personally reviewed the hospitalization record, including notes, laboratory & diagnostic data and current medications, and examined the patient at the bedside (circumstances permitting) before making care recommendations. More than fifty (50) percent of the time was spent in patient counseling and/or care coordination.   Total minutes: 25 minutes    ANEESH Grimes  Diabetes Clinical Nurse Specialist  [facility]  Access via [employer]

## 2023-03-06 NOTE — PROGRESS NOTES
0700- Bedside and Verbal shift change report given to ASAD Parra (oncoming nurse) by Sariah Hwang (offgoing nurse). Report included the following information SBAR, Kardex, ED Summary, Recent Results, and Cardiac Rhythm NSR .        0815- Dialysis at bedside    1325- Asked about patient's PO aspirin, Olimpia Aldridge MD said to hold     1408- Asked Al-Tamara about patient's sliding scale, sliding scale now in place     1613- Patient's BP elevated after schedule BP medication asked Chepe Antonio for PRN BP medication, ordered prn BP med      End of Shift Note    Bedside shift change report given to RN (oncoming nurse) by Michele Ruiz RN (offgoing nurse). Report included the following information SBAR, Kardex, ED Summary, Recent Results, and Cardiac Rhythm NSR    Shift worked:  [id number]     Shift summary and any significant changes:    Patient received dialysis today will have dialysis tomorrow, patient now on sliding scale insulin      Concerns for physician to address:  None     Zone phone for oncoming shift:   [phone]       Activity:  Activity Level: Up with Assistance  Number times ambulated in hallways past shift: 0  Number of times OOB to chair past shift: 0    Cardiac:   Cardiac Monitoring: Yes      Cardiac Rhythm: Sinus Rhythm    Access:  Current line(s): PIV     Genitourinary:   Urinary status: oliguric    Respiratory:   O2 Device: Nasal cannula  Chronic home O2 use?: YES  Incentive spirometer at bedside: NO       GI:  Last Bowel Movement Date:  (PTA)  Current diet:  ADULT DIET Regular; 3 carb choices (45 gm/meal); Low Potassium (Less than 3000 mg/day); Low Phosphorus (Less than 1000 mg)  Passing flatus: YES  Tolerating current diet: YES       Pain Management:   Patient states pain is manageable on current regimen: YES    Skin:  Corey Score: 19  Interventions: float heels and nutritional support     Patient Safety:  Fall Score:  Total Score: 1  Interventions: bed/chair alarm, gripper socks, pt to call before getting OOB, and stay with me (per policy)       Length of Stay:  Expected LOS: - - -  Actual LOS: [facility], RN

## 2023-03-06 NOTE — PROCEDURES
Hemodialysis Note: Left upper AV-Graft Clotted. Unable to initiate treatment.  Per Dr. Senaida Scheuermann, orders placed for stat carlos to be placed for HD, and for AV-graft to be declotted

## 2023-03-07 ENCOUNTER — APPOINTMENT (OUTPATIENT)
Dept: CT IMAGING | Age: 43
DRG: 628 | End: 2023-03-07
Attending: GENERAL ACUTE CARE HOSPITAL
Payer: MEDICARE

## 2023-03-07 LAB
ANION GAP SERPL CALC-SCNC: 9 MMOL/L (ref 5–15)
BUN SERPL-MCNC: 66 MG/DL (ref 6–20)
BUN/CREAT SERPL: 9 (ref 12–20)
CALCIUM SERPL-MCNC: 7.8 MG/DL (ref 8.5–10.1)
CHLORIDE SERPL-SCNC: 95 MMOL/L (ref 97–108)
CO2 SERPL-SCNC: 27 MMOL/L (ref 21–32)
CREAT SERPL-MCNC: 7.11 MG/DL (ref 0.7–1.3)
ERYTHROCYTE [DISTWIDTH] IN BLOOD BY AUTOMATED COUNT: 15.9 % (ref 11.5–14.5)
GLUCOSE BLD STRIP.AUTO-MCNC: 263 MG/DL (ref 65–117)
GLUCOSE BLD STRIP.AUTO-MCNC: 294 MG/DL (ref 65–117)
GLUCOSE BLD STRIP.AUTO-MCNC: 57 MG/DL (ref 65–117)
GLUCOSE BLD STRIP.AUTO-MCNC: 57 MG/DL (ref 65–117)
GLUCOSE BLD STRIP.AUTO-MCNC: 60 MG/DL (ref 65–117)
GLUCOSE BLD STRIP.AUTO-MCNC: 88 MG/DL (ref 65–117)
GLUCOSE BLD STRIP.AUTO-MCNC: 89 MG/DL (ref 65–117)
GLUCOSE SERPL-MCNC: 210 MG/DL (ref 65–100)
HCT VFR BLD AUTO: 36.6 % (ref 36.6–50.3)
HGB BLD-MCNC: 10.8 G/DL (ref 12.1–17)
MCH RBC QN AUTO: 24.8 PG (ref 26–34)
MCHC RBC AUTO-ENTMCNC: 29.5 G/DL (ref 30–36.5)
MCV RBC AUTO: 84.1 FL (ref 80–99)
NRBC # BLD: 0 K/UL (ref 0–0.01)
NRBC BLD-RTO: 0 PER 100 WBC
PLATELET # BLD AUTO: 178 K/UL (ref 150–400)
PMV BLD AUTO: 11.5 FL (ref 8.9–12.9)
POTASSIUM SERPL-SCNC: 4 MMOL/L (ref 3.5–5.1)
RBC # BLD AUTO: 4.35 M/UL (ref 4.1–5.7)
SERVICE CMNT-IMP: ABNORMAL
SERVICE CMNT-IMP: NORMAL
SERVICE CMNT-IMP: NORMAL
SODIUM SERPL-SCNC: 131 MMOL/L (ref 136–145)
WBC # BLD AUTO: 6.2 K/UL (ref 4.1–11.1)

## 2023-03-07 PROCEDURE — 74011250636 HC RX REV CODE- 250/636: Performed by: GENERAL ACUTE CARE HOSPITAL

## 2023-03-07 PROCEDURE — 74011000636 HC RX REV CODE- 636: Performed by: GENERAL ACUTE CARE HOSPITAL

## 2023-03-07 PROCEDURE — 74011250636 HC RX REV CODE- 250/636: Performed by: INTERNAL MEDICINE

## 2023-03-07 PROCEDURE — 74011000250 HC RX REV CODE- 250: Performed by: GENERAL ACUTE CARE HOSPITAL

## 2023-03-07 PROCEDURE — 80048 BASIC METABOLIC PNL TOTAL CA: CPT

## 2023-03-07 PROCEDURE — 74011000250 HC RX REV CODE- 250: Performed by: INTERNAL MEDICINE

## 2023-03-07 PROCEDURE — 74177 CT ABD & PELVIS W/CONTRAST: CPT

## 2023-03-07 PROCEDURE — 82962 GLUCOSE BLOOD TEST: CPT

## 2023-03-07 PROCEDURE — 92950 HEART/LUNG RESUSCITATION CPR: CPT

## 2023-03-07 PROCEDURE — 74011636637 HC RX REV CODE- 636/637: Performed by: GENERAL ACUTE CARE HOSPITAL

## 2023-03-07 PROCEDURE — 74011636637 HC RX REV CODE- 636/637: Performed by: INTERNAL MEDICINE

## 2023-03-07 PROCEDURE — 77010033678 HC OXYGEN DAILY

## 2023-03-07 PROCEDURE — 74011250637 HC RX REV CODE- 250/637: Performed by: INTERNAL MEDICINE

## 2023-03-07 PROCEDURE — 65270000046 HC RM TELEMETRY

## 2023-03-07 PROCEDURE — 36415 COLL VENOUS BLD VENIPUNCTURE: CPT

## 2023-03-07 PROCEDURE — 74011000258 HC RX REV CODE- 258: Performed by: INTERNAL MEDICINE

## 2023-03-07 PROCEDURE — 85027 COMPLETE CBC AUTOMATED: CPT

## 2023-03-07 RX ORDER — NALOXONE HYDROCHLORIDE 0.4 MG/ML
INJECTION, SOLUTION INTRAMUSCULAR; INTRAVENOUS; SUBCUTANEOUS
Status: COMPLETED | OUTPATIENT
Start: 2023-03-07 | End: 2023-03-07

## 2023-03-07 RX ORDER — HEPARIN SODIUM 1000 [USP'U]/ML
2500 INJECTION, SOLUTION INTRAVENOUS; SUBCUTANEOUS
Status: DISCONTINUED | OUTPATIENT
Start: 2023-03-07 | End: 2023-03-13 | Stop reason: HOSPADM

## 2023-03-07 RX ORDER — KETOROLAC TROMETHAMINE 30 MG/ML
30 INJECTION, SOLUTION INTRAMUSCULAR; INTRAVENOUS
Status: DISCONTINUED | OUTPATIENT
Start: 2023-03-07 | End: 2023-03-08

## 2023-03-07 RX ADMIN — OXYCODONE 5 MG: 5 TABLET ORAL at 04:18

## 2023-03-07 RX ADMIN — PANTOPRAZOLE SODIUM 40 MG: 40 TABLET, DELAYED RELEASE ORAL at 08:45

## 2023-03-07 RX ADMIN — INSULIN GLARGINE 10 UNITS: 100 INJECTION, SOLUTION SUBCUTANEOUS at 21:30

## 2023-03-07 RX ADMIN — CARVEDILOL 12.5 MG: 12.5 TABLET, FILM COATED ORAL at 16:57

## 2023-03-07 RX ADMIN — KETOROLAC TROMETHAMINE 30 MG: 30 INJECTION, SOLUTION INTRAMUSCULAR; INTRAVENOUS at 17:15

## 2023-03-07 RX ADMIN — SEVELAMER CARBONATE 1600 MG: 800 TABLET, FILM COATED ORAL at 08:45

## 2023-03-07 RX ADMIN — SODIUM CHLORIDE, PRESERVATIVE FREE 10 ML: 5 INJECTION INTRAVENOUS at 05:25

## 2023-03-07 RX ADMIN — SUCRALFATE 1 G: 1 TABLET ORAL at 21:29

## 2023-03-07 RX ADMIN — PREGABALIN 75 MG: 50 CAPSULE ORAL at 21:29

## 2023-03-07 RX ADMIN — ASPIRIN 81 MG: 81 TABLET, CHEWABLE ORAL at 08:45

## 2023-03-07 RX ADMIN — SEVELAMER CARBONATE 1600 MG: 800 TABLET, FILM COATED ORAL at 16:57

## 2023-03-07 RX ADMIN — SEVELAMER CARBONATE 1600 MG: 800 TABLET, FILM COATED ORAL at 12:21

## 2023-03-07 RX ADMIN — HEPARIN SODIUM 2500 UNITS: 1000 INJECTION INTRAVENOUS; SUBCUTANEOUS at 03:13

## 2023-03-07 RX ADMIN — SUCRALFATE 1 G: 1 TABLET ORAL at 12:21

## 2023-03-07 RX ADMIN — CARVEDILOL 12.5 MG: 12.5 TABLET, FILM COATED ORAL at 08:46

## 2023-03-07 RX ADMIN — HEPARIN SODIUM 5000 UNITS: 5000 INJECTION INTRAVENOUS; SUBCUTANEOUS at 08:44

## 2023-03-07 RX ADMIN — SODIUM CHLORIDE, PRESERVATIVE FREE 10 ML: 5 INJECTION INTRAVENOUS at 21:34

## 2023-03-07 RX ADMIN — ACETAMINOPHEN 325MG 650 MG: 325 TABLET ORAL at 21:29

## 2023-03-07 RX ADMIN — SUCRALFATE 1 G: 1 TABLET ORAL at 16:57

## 2023-03-07 RX ADMIN — HYDRALAZINE HYDROCHLORIDE 100 MG: 50 TABLET, FILM COATED ORAL at 08:45

## 2023-03-07 RX ADMIN — HYDRALAZINE HYDROCHLORIDE 100 MG: 50 TABLET, FILM COATED ORAL at 21:29

## 2023-03-07 RX ADMIN — AMLODIPINE BESYLATE 10 MG: 5 TABLET ORAL at 08:45

## 2023-03-07 RX ADMIN — DULOXETINE 60 MG: 30 CAPSULE, DELAYED RELEASE ORAL at 08:44

## 2023-03-07 RX ADMIN — SODIUM CHLORIDE, PRESERVATIVE FREE 10 ML: 5 INJECTION INTRAVENOUS at 14:58

## 2023-03-07 RX ADMIN — FINASTERIDE 5 MG: 5 TABLET, FILM COATED ORAL at 08:45

## 2023-03-07 RX ADMIN — LOSARTAN POTASSIUM 50 MG: 50 TABLET, FILM COATED ORAL at 08:46

## 2023-03-07 RX ADMIN — SUCRALFATE 1 G: 1 TABLET ORAL at 08:44

## 2023-03-07 RX ADMIN — TAMSULOSIN HYDROCHLORIDE 0.4 MG: 0.4 CAPSULE ORAL at 08:45

## 2023-03-07 RX ADMIN — HYDRALAZINE HYDROCHLORIDE 100 MG: 50 TABLET, FILM COATED ORAL at 16:57

## 2023-03-07 RX ADMIN — OXYCODONE 5 MG: 5 TABLET ORAL at 09:51

## 2023-03-07 RX ADMIN — SODIUM CHLORIDE, PRESERVATIVE FREE 10 ML: 5 INJECTION INTRAVENOUS at 14:59

## 2023-03-07 RX ADMIN — NALXONE HYDROCHLORIDE 2 MG: 0.4 INJECTION INTRAMUSCULAR; INTRAVENOUS; SUBCUTANEOUS at 10:43

## 2023-03-07 RX ADMIN — ROSUVASTATIN CALCIUM 40 MG: 40 TABLET, FILM COATED ORAL at 21:29

## 2023-03-07 RX ADMIN — IOPAMIDOL 100 ML: 755 INJECTION, SOLUTION INTRAVENOUS at 18:30

## 2023-03-07 RX ADMIN — HEPARIN SODIUM 5000 UNITS: 5000 INJECTION INTRAVENOUS; SUBCUTANEOUS at 21:33

## 2023-03-07 RX ADMIN — FUROSEMIDE 40 MG: 40 TABLET ORAL at 17:00

## 2023-03-07 RX ADMIN — SODIUM CHLORIDE, PRESERVATIVE FREE 10 ML: 5 INJECTION INTRAVENOUS at 05:26

## 2023-03-07 RX ADMIN — SODIUM CHLORIDE 1 G: 900 INJECTION INTRAVENOUS at 17:02

## 2023-03-07 RX ADMIN — FUROSEMIDE 40 MG: 40 TABLET ORAL at 08:46

## 2023-03-07 RX ADMIN — Medication 7 UNITS: at 21:29

## 2023-03-07 NOTE — PROGRESS NOTES
Noted saturations dropped rapidly from 96% to 70%. Found patient agonal breathing and cynotic. No pulse found via carotid or Femorial. Code blue called. Compressions started. Doctor Selina Delgado in and patient received his PRN Oxycodone an hour prior and Narcan IVP. Patient became arousable 15 seconds after medication given. Patient placed back on Nasal cannula. Mother at bedside and updated.

## 2023-03-07 NOTE — PROGRESS NOTES
Hospitalist Progress Note    NAME: Axel Lynn   :  1980   MRN:  [id number]       Assessment / Plan:    Acute on chronic hypoxic respiratory failure, chronically on 2 L/M  S/s fluid overload in the setting of missing HD  Pulmonary edema and B/L pleural effusion, L>right  Nephrology consulted. HD as per nephrology  Continue oxygen and wean as tolerated     Uncontrolled blood sugar  Type I DM uncontrolled  Has elevated blood sugar without anion gap. VBG shows acidosis. off insulin drip  lantus 10 units   A1c 12     Right back Pain:  Likely s/s pleural effusion vs right pyelonephritis. UA neg  renal/bladder US neg     Hypertension:  Resume home medications- coreg, amlodipine, hydralazine, losartan      ESRD on HD  Nephrology consulted  HD as per nephrology     Suspected UTI  UA is neg. Patient reports he makes little urine. Reports that he needs to drink more water to pee. Will start ceftriaxone after UA is collected. enal/bladder US neg  Will get CT abdomen and pelvis if patient continues to have right flank/back pain after HD and fluid removal.        Code Status: full code  Surrogate Decision Maker:mother   DVT Prophylaxis: sc heparin  18.5 - 24.9 Normal weight / Body mass index is 24.81 kg/m². Recommended Disposition: Home w/Family  Anticipated Discharge Date:  >48 hours   SHAE Barriers: clinical improvement, wean off O2       Subjective:     Discussed with RN events overnight. Right flank pain improving  No N/V  Afebrile  No diarrhea  No hypoxia     Review of Systems:  Symptom Y/N Comments  Symptom Y/N Comments   Fever/Chills    Chest Pain     Poor Appetite    Edema     Cough    Abdominal Pain     Sputum    Joint Pain     SOB/JOHNSTON    Pruritis/Rash     Nausea/vomit    Tolerating PT/OT     Diarrhea    Tolerating Diet     Constipation    Other       PO intake: No data found.     Wt Readings from Last 10 Encounters:   03/05/23 76.2 kg (167 lb 15.9 oz)   01/10/23 85.3 kg (188 lb)   12/29/22 78 kg (171 lb 15.3 oz)   12/27/22 78 kg (171 lb 15.3 oz)   12/21/22 78 kg (171 lb 15.3 oz)   12/06/22 80.6 kg (177 lb 11.1 oz)   11/02/22 71.8 kg (158 lb 6.4 oz)   10/25/22 69.2 kg (152 lb 8.9 oz)   09/28/22 75.8 kg (167 lb 3.2 oz)   09/13/22 72.1 kg (159 lb)       Objective:     VITALS:   Last 24hrs VS reviewed since prior progress note. Most recent are:  Patient Vitals for the past 24 hrs:   Temp Pulse Resp BP SpO2   03/06/23 1919 97.4 °F (36.3 °C) 68 14 (!) 154/85 91 %   03/06/23 1600 98 °F (36.7 °C) 67 -- (!) 147/82 --   03/06/23 1549 -- 67 -- -- --   03/06/23 1350 -- 66 -- -- --   03/06/23 1345 -- 66 -- -- --   03/06/23 1200 98 °F (36.7 °C) 66 -- (!) 144/93 --   03/06/23 1154 -- 66 -- -- --   03/06/23 0800 97.8 °F (36.6 °C) 71 13 (!) 151/92 --   03/06/23 0404 97.8 °F (36.6 °C) 67 14 (!) 145/80 93 %   03/06/23 0000 97.6 °F (36.4 °C) 74 15 119/65 90 %         Intake/Output Summary (Last 24 hours) at 3/6/2023 1508  Last data filed at 3/6/2023 2007  Gross per 24 hour   Intake 240 ml   Output 25 ml   Net 215 ml          I had a face to face encounter, and independently examined this patient as outlined below:    PHYSICAL EXAM:  General:    No distress     HEENT: Atraumatic, anicteric sclerae, pink conjunctivae, MMM  Neck:  Supple, symmetrical  Lungs:   CTA. No Wheezing/Rhonchi. No rales. No tenderness. No Accessory muscle use. CVS:   Regular rhythm. No murmur. No JVD. L UE AVF w + thrill   GI/:   Soft. NT. ND. BS normal. Mild R CVAT  Extremities: No edema. No cyanosis. No clubbing. Skin:     Not pale. Not Jaundiced. No rashes   Psych:  Good insight. Not depressed. Not anxious or agitated. Neurologic: Alert and oriented X 4. EOMs intact. No facial asymmetry. No slurred speech. Symmetrical strength, Sensation grossly intact. Labs     I reviewed today's most current labs and imaging studies.   Pertinent labs include:  Recent Labs     03/05/23  0011 03/04/23  1403   WBC 6.8 7.1   HGB 11.9* 11.9*   HCT 39.3 39.8    181       Recent Labs     03/06/23  0430 03/05/23  0406 03/05/23  0011 03/04/23 2021 03/04/23  1537 03/04/23  1403 03/04/23  1403   * 128* 132* 128* 128*  --  126*   K 3.8 4.3 3.9 3.9 4.6  --  4.2   CL 96* 96* 98 95* 96*  --  95*   CO2 27 23 23 22 21  --  22   * 94 184* 377* 617*  --  595*   BUN 56* 70* 68* 68* 63*  --  60*   CREA 6.30* 7.23* 7.07* 7.16* 6.88*  --  6.80*   CA 7.6* 8.0* 8.3* 8.1* 8.2*  --  7.8*   MG 2.6* 2.8*  --  2.6* 2.7*   < >  --    PHOS  --   --   --   --  7.8*  --   --    ALB  --   --   --   --   --   --  2.6*   TBILI  --   --   --   --   --   --  0.5   ALT  --   --   --   --   --   --  16    < > = values in this interval not displayed. US RETROPERITONEUM LTD    Result Date: 3/4/2023  Unremarkable Renal Sonogram.     XR CHEST PORT    Result Date: 3/4/2023  Pulmonary edema with moderate left and small right pleural effusions, and associated bibasilar airspace disease. IR INSERT NON TUNL CVC OVER 5 YRS    Result Date: 3/6/2023  1. Successful placement of right internal jugular temporary dialysis catheter. Catheter ready for immediate use. IR INSERT NON TUNL CVC OVER 5 YRS    Result Date: 3/6/2023  1. Successful placement of right internal jugular temporary dialysis catheter. Catheter ready for immediate use. 10/16/22    ECHO ADULT COMPLETE 10/27/2022 10/27/2022    Interpretation Summary    Left Ventricle: Normal left ventricular systolic function with a visually estimated EF of 45 - 50%. Left ventricle size is normal. Normal wall thickness. Normal wall motion. Aortic Valve: Tricuspid valve.     Signed by: Roscoe Arriola MD on 10/27/2022  8:23 AM     All Micro Results       None              Current Medications:     Current Facility-Administered Medications:     iopamidoL (ISOVUE-370) 370 mg iodine /mL (76 %) injection 100 mL, 100 mL, Other, ONCE, Иван Barrow MD    sodium chloride (NS) flush 5-40 mL, 5-40 mL, IntraVENous, Q8H, Fortunato Morton MD, 10 mL at 03/06/23 2128    sodium chloride (NS) flush 5-40 mL, 5-40 mL, IntraVENous, PRN, Fortunato Morton MD    0.9% sodium chloride infusion 25 mL, 25 mL, IntraVENous, PRN, Fortunato Morton MD    insulin lispro (HUMALOG) injection, , SubCUTAneous, AC&HS, Fortunato Morton MD, 3 Units at 03/06/23 0741    glucose chewable tablet 16 g, 4 Tablet, Oral, PRN, Fortunato Morton MD    glucagon (GLUCAGEN) injection 1 mg, 1 mg, IntraMUSCular, PRN, Fortunato Morton MD    dextrose 10% infusion 0-250 mL, 0-250 mL, IntraVENous, PRN, Fortunato Morton MD    cloNIDine HCL (CATAPRES) tablet 0.1 mg, 0.1 mg, Oral, Q6H PRN, Felix Morton MD, 0.1 mg at 03/05/23 2248    insulin regular ([place] R, HUMULIN R) 100 Units in 0.9% sodium chloride 100 mL infusion, 0-50 Units/hr, IntraVENous, TITRATE, Suzanne Tenorio MD, Stopped at 03/05/23 0233    sodium chloride (NS) flush 5-40 mL, 5-40 mL, IntraVENous, Q8H, Keila Jaime MD, 10 mL at 03/06/23 2129    sodium chloride (NS) flush 5-40 mL, 5-40 mL, IntraVENous, PRN, Suzanne Tenorio MD    acetaminophen (TYLENOL) tablet 650 mg, 650 mg, Oral, Q6H PRN, 650 mg at 03/06/23 1109 **OR** acetaminophen (TYLENOL) suppository 650 mg, 650 mg, Rectal, Q6H PRN, Suzanne Tenorio MD    polyethylene glycol (MIRALAX) packet 17 g, 17 g, Oral, DAILY PRN, Keila Jimenez MD    ondansetron (ZOFRAN ODT) tablet 4 mg, 4 mg, Oral, Q8H PRN **OR** ondansetron (ZOFRAN) injection 4 mg, 4 mg, IntraVENous, Q6H PRN, Keila Jimenez MD    amLODIPine (NORVASC) tablet 10 mg, 10 mg, Oral, DAILY, Keila Jaime MD, 10 mg at 03/06/23 0813    aspirin chewable tablet 81 mg, 81 mg, Oral, DAILY, Keila Jaime MD, 81 mg at 03/06/23 0813    carvediloL (COREG) tablet 12.5 mg, 12.5 mg, Oral, BID WITH MEALS, Keila Jaime MD, 12.5 mg at 03/06/23 1612    cloNIDine (CATAPRES) 0.1 mg/24 hr patch 1 Patch, 1 Patch, TransDERmal, Q7D, Keila Jaime MD    DULoxetine (CYMBALTA) capsule 60 mg, 60 mg, Oral, DAILY, Keila Jaime MD, 60 mg at 03/06/23 0813    finasteride (PROSCAR) tablet 5 mg, 5 mg, Oral, DAILY, Meka Sanderson MD, 5 mg at 03/06/23 0813    hydrALAZINE (APRESOLINE) tablet 100 mg, 100 mg, Oral, TID, Meka Sanderson MD, 100 mg at 03/06/23 2123    losartan (COZAAR) tablet 50 mg, 50 mg, Oral, DAILY, Meka Sanderson MD, 50 mg at 03/06/23 0813    pantoprazole (PROTONIX) tablet 40 mg, 40 mg, Oral, DAILY, Meka Sanderson MD, 40 mg at 03/06/23 0813    pregabalin (LYRICA) capsule 75 mg, 75 mg, Oral, QHS, Meka Sanderson MD, 75 mg at 03/06/23 2127    rosuvastatin (CRESTOR) tablet 40 mg, 40 mg, Oral, QHS, Meka Sanderson MD, 40 mg at 03/06/23 2122    sevelamer carbonate (RENVELA) tab 1,600 mg, 1,600 mg, Oral, TID WITH MEALS, Keila Jaime MD, 1,600 mg at 03/06/23 1612    sucralfate (CARAFATE) tablet 1 g, 1 g, Oral, AC&HS, Meka Sanderson MD, 1 g at 03/06/23 2128    tamsulosin (FLOMAX) capsule 0.4 mg, 0.4 mg, Oral, DAILY, Keila Jaime MD, 0.4 mg at 03/06/23 0813    heparin (porcine) injection 5,000 Units, 5,000 Units, SubCUTAneous, Q12H, Keila Jaime MD, 5,000 Units at 03/06/23 2128    oxyCODONE IR (ROXICODONE) tablet 5 mg, 5 mg, Oral, Q6H PRN, Meka Sanderson MD, 5 mg at 03/06/23 2007    insulin glargine (LANTUS) injection 10 Units, 10 Units, SubCUTAneous, QHS, Keila Jaime MD, 10 Units at 03/06/23 2128    cefTRIAXone (ROCEPHIN) 1 g in 0.9% sodium chloride (MBP/ADV) 50 mL MBP, 1 g, IntraVENous, Q24H, Keila Jaime MD, Last Rate: 100 mL/hr at 03/06/23 1735, 1 g at 03/06/23 1735    furosemide (LASIX) tablet 40 mg, 40 mg, Oral, BID, Chrystal Taylor MD, 40 mg at 03/06/23 1735     Procedures: see electronic medical records for all procedures/Xrays and details which were not copied into this note but were reviewed prior to creation of Plan.     Reviewed most current lab test results and cultures  YES  Reviewed most current radiology test results   YES  Review and summation of old records today    NO  Reviewed patient's current orders and MAR    YES  PMH/SH reviewed - no change compared to H&P  ________________________________________________________________________  Care Plan discussed with:    Comments   Patient x    Family      RN x    Care Manager     Consultant                        Multidiciplinary team rounds were held today with , nursing, pharmacist and clinical coordinator. Patient's plan of care was discussed; medications were reviewed and discharge planning was addressed.      ________________________________________________________________________  Total NON critical care TIME:   33  Minutes    Total CRITICAL CARE TIME Spent:   Minutes non procedure based      Comments   >50% of visit spent in counseling and coordination of care x     This includes time during multidisciplinary rounds if indicated above   ________________________________________________________________________  Minoo Victoria MD

## 2023-03-07 NOTE — PROCEDURES
[facility]                      [phone]  Vitals Pre Post Assessment Pre Post   BP BP: (!) 143/69 (03/07/23 1245)   157/71 LOC AOX4 AOX4   HR Pulse (Heart Rate): 79 (03/07/23 1245) 83 Lungs 02 @ 5 LPM NC 02 @ 5 LPM NC   Resp Resp Rate: 18 (03/07/23 1200) 20 Cardiac NRR NRR   Temp Temp: 99.1 °F (37.3 °C) (03/07/23 1200)  Skin WDI WDI   Weight   Edema 1+ BLE 1+ BLE   Pain Pain Intensity 1: 0 (03/07/23 1200) 0/10 Tele Status Bedside monitor Bedside monitor     Orders   Duration: Start: 6653 End: 1545 Total: 3 hrs   Dialyzer: Dialyzer/Set Up Inspection: Adina Muniz (03/07/23 1245)   K Bath: Dialysate K (mEq/L): 2 (03/07/23 1245)   Ca Bath: Dialysate CA (mEq/L): 2.5 (03/07/23 1245)   Na: Dialysate NA (mEq/L): 138 (03/07/23 1245)   Bicarb:  35   Target Fluid Removal: Goal/Amount of Fluid to Remove (mL): 3000 mL (03/07/23 1245)     Access   Type & Location: R CVC   Comments:   Access Dressing is intact; no evidence of used and dated. Each catheter limb disinfected per p&p, caps removed, hubs disinfected per p&p. Both lumen flushes without issues.                                       Labs   HBsAg (Antigen) / date: Negative (2/15/23)                             HBsAb (Antibody) / date: Susceptible (9/14/22)   Source: Physician portal   Obtained/Reviewed  Critical Results Called HGB   Date Value Ref Range Status   03/07/2023 10.8 (L) 12.1 - 17.0 g/dL Final     Potassium   Date Value Ref Range Status   03/07/2023 4.0 3.5 - 5.1 mmol/L Final     Calcium   Date Value Ref Range Status   03/07/2023 7.8 (L) 8.5 - 10.1 MG/DL Final     BUN   Date Value Ref Range Status   03/07/2023 66 (H) 6 - 20 MG/DL Final     Creatinine   Date Value Ref Range Status   03/07/2023 7.11 (H) 0.70 - 1.30 MG/DL Final        Meds Given   Name Dose Route                    Adequacy / Fluid    Total Liters Process:                  70 Liters   Net Fluid Removed:                 3000 ml      Comments   Time Out Done: 1240   Admitting Diagnosis: Acute on chronic respiratory failure with hypoxia   Consent obtained/signed: Informed Consent Verified: Yes (03/07/23 1245)   Machine / RO # Machine Number: [id number] (03/07/23 [id number])   Primary Nurse Rpt Pre: Mattie Kan RN   Primary Nurse Rpt Post: Mattie Kan RN   Pt Education: Fluid restrictions   Care Plan: ongoing   [facility] Summary   Comments:                         5467: Time out done, order parameters checked, Dialysis related medications and consent have been reviewed. 1245: Treatment started with slow flow, gradually increased to ordered BF. Monitored closely. Lines visible and secured at all time. Patients vitally stable. 1545: Treatment completed, all blood returned. Each dialysis catheter limb disinfected per p&p,  post dialysis catheter dwell instilled per order, and caps applied. Dressing changed and dated (3/7/23)  Treatment tolerated well. Comfort and care provided, needs attended, questions answered. Call bell within reach, bed to lowest position.

## 2023-03-07 NOTE — PROCEDURES
Hemodialysis / [phone]    Vitals Pre Post Assessment Pre Post   /95 151/83 LOC A&O x4 A&O x4   HR 65 74 Lungs No SOB No SOB   Resp 14 16 Cardiac regular regular   Temp 98.0 98.0 Skin Dry, warm Dry, warm   Weight    Edema mild none   Tele status Bedside monitor Bedside monitor Pain 0 0     Orders   Duration: Start: 2350 End: 0320 Total: 3.5 hrs   Dialyzer: Dialyzer/Set Up Inspection: Revaclear (03/06/23 2350)   K Bath: Dialysate K (mEq/L): 3 (03/06/23 2350)   Ca Bath: Dialysate CA (mEq/L): 2.5 (03/06/23 2350)   Na: Dialysate NA (mEq/L): 138 (03/06/23 2350)   Bicarb: Dialysate HCO3 (mEq/L): 35 (03/06/23 2350)   Target Fluid Removal: Goal/Amount of Fluid to Remove (mL): 3500 mL (03/05/23 0855)     Access   Type & Location: Right IJ: dressing dry, intact, ports cleaned per P&P, flushing well   Comments:                                        Labs   HBsAg (Antigen) / date: 02/15/23 negative                                              HBsAb (Antibody) / date: 09/14/22 susceptible   Source:    Obtained/Reviewed  Critical Results Called HGB   Date Value Ref Range Status   03/05/2023 11.9 (L) 12.1 - 17.0 g/dL Final     Potassium   Date Value Ref Range Status   03/06/2023 3.8 3.5 - 5.1 mmol/L Final     Calcium   Date Value Ref Range Status   03/06/2023 7.6 (L) 8.5 - 10.1 MG/DL Final     BUN   Date Value Ref Range Status   03/06/2023 56 (H) 6 - 20 MG/DL Final     Creatinine   Date Value Ref Range Status   03/06/2023 6.30 (H) 0.70 - 1.30 MG/DL Final        Meds Given   Name Dose Route        Heparin 2500 units HD ports          Adequacy / Fluid    Total Liters Process: 75 L   Net Fluid Removed: 3500 ml      Comments   Time Out Done:   (Time) 2345   Admitting Diagnosis:    Consent obtained/signed: Informed Consent Verified: Yes (03/05/23 0855)   Machine / RO # Machine Number: [id number] (03/06/23 2350)   Primary Nurse Rpt Pre: Nicolasa Cuevas, RN   Primary Nurse Rpt Post: Nicolasa Cuevas, RN   Pt Education: Access care, procedure Care Plan: Continue HD tx as per MD order   Pts outpatient clinic:      Tx Summary   Comments: Tolerated tx well, at the end remaining blood in circuit returned with 300 cc NS, ports flushed with NS, locked with heparin, cleaned per P&P, caps applied.

## 2023-03-07 NOTE — CONSULTS
Vascular Surgery Consult Note  3/7/2023    Subjective: Roderick Padgett is a 38 y.o. male with PMHx significant for ESRD (on HD MWF), HTN and DM Type I who we are asked to evaluate for clotted dialysis access. He presented to [id number] from [facility] on 3/4/23 with complaints of right flank pain, dypsnea with pleural effusions, elevatd blood glucose, and missed dialysis and was admitted for ARF, pulmonary edema and hyperglycemia. He is s/p LUE AVG with Dr. Kelli Fuller on 12/29/22. He had R IJ central line placed on 3/6/23 with IR. He reports no issue with AVG prior. He was seen this morning with complaints of abdominal pain.      Past Medical History:   Diagnosis Date    Bipolar 1 disorder, depressed ([address]     Bipolar disorder ([address]     Chronic kidney disease, stage 3a ([address]     Depression     Diabetes ([address]     DKA, type 1 ([address] 1/27/2013    diagnosed age 23    DKA, type 1 ([address]     H/O noncompliance with medical treatment, presenting hazards to health     MRSA (methicillin resistant staph aureus) culture positive     MRSA (methicillin resistant Staphylococcus aureus)     Face    Noncompliance with medication regimen     Second hand smoke exposure     Seizure ([address]     Seizures ([address] 2006 or 2007    one episode during [facility]      Past Surgical History:   Procedure Laterality Date    HX HEART CATHETERIZATION  11/2022    AT [facility] AFTER WHAT PATIENT CALLED \"MILD HEART ATTACK\"    HX HEENT      top left wisdom tooth    HX ORTHOPAEDIC Left     wrist; [facility]    IR INSERT NON TUNL CVC OVER 5 YRS  09/07/2022    IR INSERT NON TUNL CVC OVER 5 YRS  3/6/2023    IR INSERT TUNL CVC W/O PORT OVER 5 YR  09/09/2022    UPPER GI ENDOSCOPY,BIOPSY  11/20/2018          Family History   Problem Relation Age of Onset    Diabetes Mother     Diabetes Other         neice, type 1       Social History     Tobacco Use    Smoking status: Former     Packs/day: 0.10     Years: 16.00     Pack years: 1.60     Types: Cigarettes     Start date: 10/4/1999     Quit date: 2/25/2020     Years since quitting: 3.0    Smokeless tobacco: Never   Substance Use Topics    Alcohol use: No       Prior to Admission medications    Medication Sig Start Date End Date Taking? Authorizing Provider   aspirin 81 mg chewable tablet Take 81 mg by mouth daily. 12/27/22   Provider, Historical   furosemide (LASIX) 40 mg tablet Take 1 Tablet by mouth daily. 1/27/23   Martinez Rob MD   sucralfate (CARAFATE) 1 gram tablet Take 1 Tablet by mouth Before breakfast, lunch, dinner and at bedtime for 62 days. 1/27/23 3/30/23  Martinez Rob MD   flash glucose sensor (FreeStyle Connor 14 Day Sensor) kit Administer blood sugars four times daily 1/27/23   Martinez Rob MD   insulin glargine (LANTUS,BASAGLAR) 100 unit/mL (3 mL) inpn [place] 10 units subcut daily 1/27/23   Martinez Rob MD   Insulin Syringe-Needle U-100 1 mL 28 x 5/16\" syrg 1 Syringe by SubCUTAneous route Before breakfast, lunch, dinner and at bedtime. 1/27/23   Martinez Rob MD   insulin lispro (HUMALOG) 100 unit/mL kwikpen Administer 10 units before each meal 1/27/23   Martinez Rob MD   carvediloL (COREG) 12.5 mg tablet Take 1 Tablet by mouth two (2) times daily (with meals). 1/10/23   Martinez Rob MD   amLODIPine (NORVASC) 10 mg tablet Take 1 Tablet by mouth daily. 1/10/23   Martinez Rob MD   finasteride (PROSCAR) 5 mg tablet Take 1 Tablet by mouth daily. 1/10/23   Martinez Rob MD   hydrALAZINE (APRESOLINE) 100 mg tablet Take 1 Tablet by mouth three (3) times daily. 1/10/23   Martinez Rob MD   losartan (COZAAR) 50 mg tablet Take 1 Tablet by mouth daily. 1/10/23   Martinez Rob MD   pantoprazole (PROTONIX) 40 mg tablet Take 1 Tablet by mouth daily for 90 days. 1/10/23 4/10/23  Martinez Rob MD   pregabalin (Lyrica) 75 mg capsule Take 1 Capsule by mouth nightly.  Max Daily Amount: 75 mg. 1/10/23   Martinez Rob MD   tamsulosin (FLOMAX) 0.4 mg capsule Take 1 Capsule by mouth daily. 1/10/23   Frantz Scott MD   rosuvastatin (CRESTOR) 40 mg tablet Take 1 Tablet by mouth nightly. 1/10/23   Frantz Scott MD   flash glucose scanning reader (FreeStyle Connor 14 Day [place]) misc Administer blood sugars four times daily 1/10/23   Frantz Scott MD   cloNIDine (CATAPRES) 0.1 mg/24 hr ptwk 1 Patch by TransDERmal route every seven (7) days. 1/10/23   Frantz Scott MD   DULoxetine (CYMBALTA) 60 mg capsule Take 1 Capsule by mouth daily. 1/10/23   Frantz Scott MD   sodium bicarbonate 650 mg tablet Take 650 mg by mouth three (3) times daily. Provider, Historical   fluconazole (DIFLUCAN) 100 mg tablet Take 100 mg by mouth daily. FDA advises cautious prescribing of oral fluconazole in pregnancy. Provider, Historical   OXYGEN-AIR DELIVERY SYSTEMS Take 2 L/min by inhalation continuous. Provider, Historical   OXYGEN-AIR DELIVERY SYSTEMS Take 2 L/min by inhalation continuous. Provider, Historical   sevelamer (RENAGEL) 800 mg tablet Take 1,600 mg by mouth three (3) times daily (with meals). Provider, Historical   lancets misc ACHS 10/28/22   Sherri Nogueira MD   Blood-Glucose Meter monitoring kit [facility] 10/28/22   Sherri Nogueira MD   glucose blood VI test strips (blood glucose test) strip [facility] 10/28/22   Sherri Nogueira MD   DULoxetine (CYMBALTA) 60 mg capsule Take 1 capsule by mouth once daily 10/6/22   Frantz Scott MD   naloxone (Narcan) 4 mg/actuation nasal spray Use 1 spray intranasally, then discard. Repeat with new spray every 2 min as needed for opioid overdose symptoms, alternating nostrils. 9/13/22   Frantz Scott MD   Ketone Blood Test strp 50 Each by Does Not Apply route as needed for PRN Reason (Other) (as needed for suspected dka).   Patient not taking: Reported on 1/10/2023 8/23/22   Jitendra Ferreira MD   [address] (Ultra-Light Rollator) misc Use while ambulating 7/14/22 Amanda Champion MD   Dexcom G6  misc Use with the dexcom device to check blood sugars 4 times a day  Patient not taking: No sig reported 7/1/22   Nuria Hein MD   Dexcom G6 Sensor brandi Use as directed every 10 days  Patient not taking: Reported on 1/10/2023 6/21/22   Nuria Hein MD   Dexcom G6 Transmitter brandi Use as directed  Patient not taking: Reported on 1/10/2023 6/21/22   Nuria Hein MD   Insulin Needles, Disposable, 31 gauge x 5/16\" ndle Dx code E11.65, use 6x daily 6/21/22   Nuria Hein MD     No Known Allergies     Review of Systems:  Review of Systems   Constitutional:  Negative for appetite change, chills and diaphoresis. HENT: Negative. Respiratory:  Negative for apnea, chest tightness and shortness of breath. Cardiovascular: Negative. Gastrointestinal:  Positive for abdominal pain. Negative for nausea and vomiting. Endocrine: Negative. Genitourinary: Negative. Musculoskeletal: Negative. Allergic/Immunologic: Negative. Neurological: Negative. Hematological: Negative. Psychiatric/Behavioral: Negative.        Objective:       Patient Vitals for the past 24 hrs:   BP Temp Pulse Resp SpO2 Weight   03/07/23 1125 -- -- 82 -- 96 % --   03/07/23 0945 -- -- 76 -- 93 % --   03/07/23 0800 (!) 151/84 -- 82 16 93 % --   03/07/23 0525 -- -- -- -- -- 73.8 kg (162 lb 11.2 oz)   03/07/23 0325 (!) 151/83 98 °F (36.7 °C) 74 16 94 % --   03/07/23 0320 126/68 -- 68 16 -- --   03/07/23 0300 (!) 143/75 -- 70 16 -- --   03/07/23 0245 (!) 141/74 -- 70 16 -- --   03/07/23 0230 (!) 152/75 -- 69 16 -- --   03/07/23 0216 (!) 138/108 -- 67 16 -- --   03/07/23 0201 129/76 -- 69 16 -- --   03/07/23 0200 118/87 -- 68 16 -- --   03/07/23 0145 (!) 144/92 -- 68 16 -- --   03/07/23 0130 116/67 -- 68 16 -- --   03/07/23 0115 (!) 129/51 -- 68 16 -- --   03/07/23 0100 130/86 -- 67 16 -- --   03/07/23 0045 104/67 -- 66 14 -- --   03/07/23 0030 133/75 -- 66 14 -- --   03/07/23 0015 (!) 123/95 -- 65 16 -- --   03/07/23 0000 137/74 -- 65 16 -- --   03/06/23 2350 (!) 135/95 98 °F (36.7 °C) 65 14 -- --   03/06/23 1919 (!) 154/85 97.4 °F (36.3 °C) 68 14 91 % --   03/06/23 1600 (!) 147/82 98 °F (36.7 °C) 67 -- -- --   03/06/23 1549 -- -- 67 -- -- --   03/06/23 1350 -- -- 66 -- -- --   03/06/23 1345 -- -- 66 -- -- --     ---------------------------------------------------------------------------------------------------------    Physical Exam:   Physical Exam  Vitals and nursing note reviewed. Constitutional:       General: He is not in acute distress. Appearance: He is ill-appearing. HENT:      Head: Normocephalic. Eyes:      General: No scleral icterus. Cardiovascular:      Rate and Rhythm: Normal rate. Arteriovenous access: Left arteriovenous access is present. Comments: LUE AVG without palpable thrill and faint bruit  Neurological:      Mental Status: He is alert.        Pertinent Test Results:   Recent Results (from the past 24 hour(s))   GLUCOSE, POC    Collection Time: 03/06/23  4:01 PM   Result Value Ref Range    Glucose (POC) 129 (H) 65 - 117 mg/dL    Performed by Edith Becerril RN    GLUCOSE, POC    Collection Time: 03/06/23  9:07 PM   Result Value Ref Range    Glucose (POC) 190 (H) 65 - 117 mg/dL    Performed by Edson Webb RN    METABOLIC PANEL, BASIC    Collection Time: 03/07/23 12:04 AM   Result Value Ref Range    Sodium 131 (L) 136 - 145 mmol/L    Potassium 4.0 3.5 - 5.1 mmol/L    Chloride 95 (L) 97 - 108 mmol/L    CO2 27 21 - 32 mmol/L    Anion gap 9 5 - 15 mmol/L    Glucose 210 (H) 65 - 100 mg/dL    BUN 66 (H) 6 - 20 MG/DL    Creatinine 7.11 (H) 0.70 - 1.30 MG/DL    BUN/Creatinine ratio 9 (L) 12 - 20      eGFR 9 (L) >60 ml/min/1.73m2    Calcium 7.8 (L) 8.5 - 10.1 MG/DL   CBC W/O DIFF    Collection Time: 03/07/23 12:19 AM   Result Value Ref Range    WBC 6.2 4.1 - 11.1 K/uL    RBC 4.35 4.10 - 5.70 M/uL    HGB 10.8 (L) 12.1 - 17.0 g/dL    HCT 36.6 36.6 - 50.3 % MCV 84.1 80.0 - 99.0 FL    MCH 24.8 (L) 26.0 - 34.0 PG    MCHC 29.5 (L) 30.0 - 36.5 g/dL    RDW 15.9 (H) 11.5 - 14.5 %    PLATELET 560 943 - 569 K/uL    MPV 11.5 8.9 - 12.9 FL    NRBC 0.0 0  WBC    ABSOLUTE NRBC 0.00 0.00 - 0.01 K/uL   GLUCOSE, POC    Collection Time: 03/07/23  8:24 AM   Result Value Ref Range    Glucose (POC) 89 65 - 117 mg/dL    Performed by Douglas Bai RN    GLUCOSE, POC    Collection Time: 03/07/23 12:05 PM   Result Value Ref Range    Glucose (POC) 88 65 - 117 mg/dL    Performed by Douglas Bai RN        Assessment/Plan:     ESRD on HD  Electrolyte imbalance  Anemia of CKD  Missed HD  Fluid overload  - HD management per nephrology  Dialysis access complication  S/p LUE AVG 12/2022  - clotted with no thrill and faint bruit  - has R IJ placed in IR 3/6/23  - Dr. Carlos Alberto Alonso to evaluate for declotting    Acute on chronic hypoxic respiratory failure  Pulmonary edema and B/L pleural effusion, L>right  - code blue called this afternoon for agonal breathing and loss of pulse - Oxycodone administered an hour earlier  - patient arousable after  compressions and narcan  - remains on NC    DM1, uncontrolled  - management per primary team    Right flank/abdominal pain  - management per primary team  - UA negative, Renal U/S negative    HTN  VTE Prophylaxis:    Disposition:    Signed By: Mariela Miller NP     March 7, 2023

## 2023-03-07 NOTE — PROGRESS NOTES
Nephrology Progress Note  [facility] / [address], [place], [address]  Phone - [phone]  Fax - [phone]                 Patient: Mark Gomez                   YOB: 1980        Date- 3/7/2023                      Admit Date: 3/4/2023  CC: Follow up for esrd        IMPRESSION & PLAN:   ESRD- on hd mwf at [facility]  Hyponatremia  Fluid overlaod  Anemia of ckd  Pulmonary edema  hypertension  Uncontrolled DM  H/o Urinary retention requiring hansen cath in pasts  Type 1 diabetes      PLAN-  Hd again today  Consult vascular surgery for clotted avg  Continue norvasc, coreg, clonidine, hydralazine, losartan. Continue hd mwf  Follow bmp     Subjective: Interval History:   -3-7-23--he has clotted avg- he had new carlos cath placed yesterday-- he had hd last night  3-6-23--  bp stable--Sob improved- c/o right sided abdo pain-    Objective:   Vitals:    03/07/23 0325 03/07/23 0525 03/07/23 0800 03/07/23 0945   BP: (!) 151/83  (!) 151/84    Pulse: 74  82 76   Resp: 16  16    Temp: 98 °F (36.7 °C)      SpO2: 94%  93% 93%   Weight:  73.8 kg (162 lb 11.2 oz)        03/06 0701 - 03/07 0700  In: 240 [P.O.:240]  Out: 0190 [Urine:25]    Last 3 Recorded Weights in this Encounter    03/05/23 0637 03/07/23 0525   Weight: 76.2 kg (167 lb 15.9 oz) 73.8 kg (162 lb 11.2 oz)        Physical exam:    GEN:  NAD  NECK:  Supple, no thyromegaly  RESP: Clear  b/l, no  wheezing,   CVS: RRR,S1,S2   NEURO: non focal, normal speech  EXT: Edema +nt     Right ij carlos +  Left arm avg - no bruit or thrill    Chart reviewed. Pertinent Notes reviewed.      Data Review :  Recent Labs     03/07/23  0004 03/06/23  0430 03/05/23  0406 03/05/23  0011 03/04/23 2021 03/04/23  1537   * 132* 128*   < > 128* 128*   K 4.0 3.8 4.3   < > 3.9 4.6   CL 95* 96* 96*   < > 95* 96*   CO2 27 27 23   < > 22 21   BUN 66* 56* 70*   < > 68* 63*   CREA 7.11* 6.30* 7.23*   < > 7.16* 6.88*   * 198* 94   < > 377* 617*   CA 7.8* 7.6* 8.0*   < > 8.1* 8.2*   MG  --  2.6* 2.8*  --  2.6* 2.7*   PHOS  --   --   --   --   --  7.8*    < > = values in this interval not displayed. Recent Labs     03/07/23  0019 03/05/23  0011 03/04/23  1403   WBC 6.2 6.8 7.1   HGB 10.8* 11.9* 11.9*   HCT 36.6 39.3 39.8    184 181       No results for input(s): FE, TIBC, PSAT, FERR in the last 72 hours.    Lab Results   Component Value Date/Time    Hemoglobin A1c 12.3 (H) 03/04/2023 03:37 PM    Hemoglobin A1c 10.1 (H) 11/29/2022 08:48 PM    Hemoglobin A1c 7.2 (H) 09/26/2022 09:34 AM        Lab Results   Component Value Date/Time    Microalbumin/Creat ratio (mg/g creat) 11,439 (H) 04/01/2021 10:00 AM    Microalbumin,urine random 151.00 04/01/2021 10:00 AM     US Results (most recent):  Medication list  reviewed  Current Facility-Administered Medications   Medication Dose Route Frequency    heparin (porcine) 1,000 unit/mL injection 2,500 Units  2,500 Units Hemodialysis DIALYSIS PRN    sodium chloride (NS) flush 5-40 mL  5-40 mL IntraVENous Q8H    sodium chloride (NS) flush 5-40 mL  5-40 mL IntraVENous PRN    0.9% sodium chloride infusion 25 mL  25 mL IntraVENous PRN    insulin lispro (HUMALOG) injection   SubCUTAneous AC&HS    glucose chewable tablet 16 g  4 Tablet Oral PRN    glucagon (GLUCAGEN) injection 1 mg  1 mg IntraMUSCular PRN    dextrose 10% infusion 0-250 mL  0-250 mL IntraVENous PRN    cloNIDine HCL (CATAPRES) tablet 0.1 mg  0.1 mg Oral Q6H PRN    sodium chloride (NS) flush 5-40 mL  5-40 mL IntraVENous PRN    acetaminophen (TYLENOL) tablet 650 mg  650 mg Oral Q6H PRN    Or    acetaminophen (TYLENOL) suppository 650 mg  650 mg Rectal Q6H PRN    polyethylene glycol (MIRALAX) packet 17 g  17 g Oral DAILY PRN    ondansetron (ZOFRAN ODT) tablet 4 mg  4 mg Oral Q8H PRN    Or    ondansetron (ZOFRAN) injection 4 mg  4 mg IntraVENous Q6H PRN    amLODIPine (NORVASC) tablet 10 mg  10 mg Oral DAILY    aspirin chewable tablet 81 mg  81 mg Oral DAILY    carvediloL (COREG) tablet 12.5 mg  12.5 mg Oral BID WITH MEALS    cloNIDine (CATAPRES) 0.1 mg/24 hr patch 1 Patch  1 Patch TransDERmal Q7D    DULoxetine (CYMBALTA) capsule 60 mg  60 mg Oral DAILY    finasteride (PROSCAR) tablet 5 mg  5 mg Oral DAILY    hydrALAZINE (APRESOLINE) tablet 100 mg  100 mg Oral TID    losartan (COZAAR) tablet 50 mg  50 mg Oral DAILY    pantoprazole (PROTONIX) tablet 40 mg  40 mg Oral DAILY    pregabalin (LYRICA) capsule 75 mg  75 mg Oral QHS    rosuvastatin (CRESTOR) tablet 40 mg  40 mg Oral QHS    sevelamer carbonate (RENVELA) tab 1,600 mg  1,600 mg Oral TID WITH MEALS    sucralfate (CARAFATE) tablet 1 g  1 g Oral AC&HS    tamsulosin (FLOMAX) capsule 0.4 mg  0.4 mg Oral DAILY    heparin (porcine) injection 5,000 Units  5,000 Units SubCUTAneous Q12H    oxyCODONE IR (ROXICODONE) tablet 5 mg  5 mg Oral Q6H PRN    insulin glargine (LANTUS) injection 10 Units  10 Units SubCUTAneous QHS    cefTRIAXone (ROCEPHIN) 1 g in 0.9% sodium chloride (MBP/ADV) 50 mL MBP  1 g IntraVENous Q24H    furosemide (LASIX) tablet 40 mg  40 mg Oral BID        Padmini Garza MD  3/7/2023

## 2023-03-07 NOTE — PROGRESS NOTES
Currently dialyzing through temp R sided cath  LUE graft placed by Dr Santa Quezada at [facility] and had percutaneous intervention as outpt 2/28  Graft now occluded  Will try to get thrombectomized during this admission if schedule allows

## 2023-03-07 NOTE — PROGRESS NOTES
Problem: Falls - Risk of  Goal: *Absence of Falls  Description: Document Machelle Patrick Fall Risk and appropriate interventions in the flowsheet. Outcome: Progressing Towards Goal  Note: Fall Risk Interventions:     Patient to call before getting out of bed. Medication Interventions: Bed/chair exit alarm         Problem: Risk for Spread of Infection  Goal: Prevent transmission of infectious organism to others  Description: Prevent the transmission of infectious organisms to other patients, staff members, and visitors. Outcome: Progressing Towards Goal   1900: Bedside and Verbal shift change report given to Yaritza Velazquez RN (oncoming nurse) by Lena Leiva RN (offgoing nurse). Report included the following information SBAR, Kardex, and MAR.   2105: patient requested to be bladder scanned b/c he felt that his bladder is full. Bladder scanned showed 5 mL. 2250: Dialysis nurse came to bedside. End of Shift Note    Bedside shift change report given to Valentin Rush (oncoming nurse) by Maddy Pagan RN (offgoing nurse). Report included the following information SBAR, Kardex, and MAR    Shift worked:  [id number]     Shift summary and any significant changes:          Concerns for physician to address:       Zone phone for oncoming shift:          Activity:  Activity Level: Up ad leeanne  Number times ambulated in hallways past shift: 0  Number of times OOB to chair past shift: 0    Cardiac:   Cardiac Monitoring: Yes      Cardiac Rhythm: Sinus Rhythm    Access:  Current line(s): PIV     Genitourinary:   Urinary status: oliguric    Respiratory:   O2 Device: Nasal cannula  Chronic home O2 use?: YES  Incentive spirometer at bedside: NO       GI:  Last Bowel Movement Date: 03/03/23 (patient state)  Current diet:  ADULT DIET Regular; 3 carb choices (45 gm/meal); Low Potassium (Less than 3000 mg/day);  Low Phosphorus (Less than 1000 mg)  DIET ONE TIME MESSAGE  Passing flatus: YES  Tolerating current diet: YES       Pain Management:   Patient states pain is manageable on current regimen: YES    Skin:  Corey Score: 21  Interventions: increase time out of bed    Patient Safety:  Fall Score:  Total Score: 1  Interventions: gripper socks       Length of Stay:  Expected LOS: 3d 9h  Actual LOS: 3      Vandana Díaz RN

## 2023-03-07 NOTE — PROGRESS NOTES
Code Blue was called. Chest Compressions were started and patient was bagged by RT.   Narcan given patient woke up and placed on a 4lpm NC

## 2023-03-08 LAB
ALBUMIN SERPL-MCNC: 2.4 G/DL (ref 3.5–5)
ANION GAP SERPL CALC-SCNC: 5 MMOL/L (ref 5–15)
BUN SERPL-MCNC: 28 MG/DL (ref 6–20)
BUN/CREAT SERPL: 7 (ref 12–20)
CALCIUM SERPL-MCNC: 7.8 MG/DL (ref 8.5–10.1)
CHLORIDE SERPL-SCNC: 97 MMOL/L (ref 97–108)
CO2 SERPL-SCNC: 27 MMOL/L (ref 21–32)
CREAT SERPL-MCNC: 4.28 MG/DL (ref 0.7–1.3)
ERYTHROCYTE [DISTWIDTH] IN BLOOD BY AUTOMATED COUNT: 15.3 % (ref 11.5–14.5)
GLUCOSE BLD STRIP.AUTO-MCNC: 179 MG/DL (ref 65–117)
GLUCOSE BLD STRIP.AUTO-MCNC: 233 MG/DL (ref 65–117)
GLUCOSE BLD STRIP.AUTO-MCNC: 245 MG/DL (ref 65–117)
GLUCOSE BLD STRIP.AUTO-MCNC: 26 MG/DL (ref 65–117)
GLUCOSE BLD STRIP.AUTO-MCNC: 265 MG/DL (ref 65–117)
GLUCOSE BLD STRIP.AUTO-MCNC: 27 MG/DL (ref 65–117)
GLUCOSE BLD STRIP.AUTO-MCNC: 401 MG/DL (ref 65–117)
GLUCOSE BLD STRIP.AUTO-MCNC: 428 MG/DL (ref 65–117)
GLUCOSE SERPL-MCNC: 262 MG/DL (ref 65–100)
HCT VFR BLD AUTO: 40.6 % (ref 36.6–50.3)
HGB BLD-MCNC: 12 G/DL (ref 12.1–17)
MCH RBC QN AUTO: 24.9 PG (ref 26–34)
MCHC RBC AUTO-ENTMCNC: 29.6 G/DL (ref 30–36.5)
MCV RBC AUTO: 84.4 FL (ref 80–99)
NRBC # BLD: 0 K/UL (ref 0–0.01)
NRBC BLD-RTO: 0 PER 100 WBC
PHOSPHATE SERPL-MCNC: 3.9 MG/DL (ref 2.6–4.7)
PLATELET # BLD AUTO: 190 K/UL (ref 150–400)
PMV BLD AUTO: 11.7 FL (ref 8.9–12.9)
POTASSIUM SERPL-SCNC: 4.2 MMOL/L (ref 3.5–5.1)
RBC # BLD AUTO: 4.81 M/UL (ref 4.1–5.7)
SERVICE CMNT-IMP: ABNORMAL
SODIUM SERPL-SCNC: 129 MMOL/L (ref 136–145)
WBC # BLD AUTO: 5.6 K/UL (ref 4.1–11.1)

## 2023-03-08 PROCEDURE — 36415 COLL VENOUS BLD VENIPUNCTURE: CPT

## 2023-03-08 PROCEDURE — 74011000258 HC RX REV CODE- 258: Performed by: INTERNAL MEDICINE

## 2023-03-08 PROCEDURE — 80069 RENAL FUNCTION PANEL: CPT

## 2023-03-08 PROCEDURE — 74011250637 HC RX REV CODE- 250/637: Performed by: GENERAL ACUTE CARE HOSPITAL

## 2023-03-08 PROCEDURE — 74011636637 HC RX REV CODE- 636/637

## 2023-03-08 PROCEDURE — 74011000250 HC RX REV CODE- 250: Performed by: GENERAL ACUTE CARE HOSPITAL

## 2023-03-08 PROCEDURE — 82962 GLUCOSE BLOOD TEST: CPT

## 2023-03-08 PROCEDURE — 74011250637 HC RX REV CODE- 250/637: Performed by: INTERNAL MEDICINE

## 2023-03-08 PROCEDURE — 90935 HEMODIALYSIS ONE EVALUATION: CPT

## 2023-03-08 PROCEDURE — 85027 COMPLETE CBC AUTOMATED: CPT

## 2023-03-08 PROCEDURE — 74011250636 HC RX REV CODE- 250/636: Performed by: INTERNAL MEDICINE

## 2023-03-08 PROCEDURE — 77010033678 HC OXYGEN DAILY

## 2023-03-08 PROCEDURE — 74011636637 HC RX REV CODE- 636/637: Performed by: NURSE PRACTITIONER

## 2023-03-08 PROCEDURE — 74011636637 HC RX REV CODE- 636/637: Performed by: INTERNAL MEDICINE

## 2023-03-08 PROCEDURE — 74011000250 HC RX REV CODE- 250: Performed by: INTERNAL MEDICINE

## 2023-03-08 PROCEDURE — 65270000046 HC RM TELEMETRY

## 2023-03-08 RX ORDER — INSULIN GLARGINE 100 [IU]/ML
5 INJECTION, SOLUTION SUBCUTANEOUS
Status: DISCONTINUED | OUTPATIENT
Start: 2023-03-08 | End: 2023-03-09

## 2023-03-08 RX ORDER — DEXTROSE MONOHYDRATE 100 MG/ML
250 INJECTION, SOLUTION INTRAVENOUS ONCE
Status: COMPLETED | OUTPATIENT
Start: 2023-03-08 | End: 2023-03-08

## 2023-03-08 RX ORDER — OXYCODONE HYDROCHLORIDE 5 MG/1
2.5 TABLET ORAL
Status: DISCONTINUED | OUTPATIENT
Start: 2023-03-08 | End: 2023-03-13 | Stop reason: HOSPADM

## 2023-03-08 RX ORDER — ALBUMIN HUMAN 250 G/1000ML
12.5 SOLUTION INTRAVENOUS
Status: DISCONTINUED | OUTPATIENT
Start: 2023-03-08 | End: 2023-03-13 | Stop reason: HOSPADM

## 2023-03-08 RX ORDER — INSULIN LISPRO 100 [IU]/ML
4 INJECTION, SOLUTION INTRAVENOUS; SUBCUTANEOUS ONCE
Status: COMPLETED | OUTPATIENT
Start: 2023-03-08 | End: 2023-03-08

## 2023-03-08 RX ADMIN — AMLODIPINE BESYLATE 10 MG: 5 TABLET ORAL at 09:04

## 2023-03-08 RX ADMIN — CLONIDINE HYDROCHLORIDE 0.1 MG: 0.1 TABLET ORAL at 00:26

## 2023-03-08 RX ADMIN — PREGABALIN 75 MG: 50 CAPSULE ORAL at 21:57

## 2023-03-08 RX ADMIN — FINASTERIDE 5 MG: 5 TABLET, FILM COATED ORAL at 09:02

## 2023-03-08 RX ADMIN — DEXTROSE MONOHYDRATE 250 ML: 100 INJECTION, SOLUTION INTRAVENOUS at 08:24

## 2023-03-08 RX ADMIN — Medication 4 UNITS: at 22:23

## 2023-03-08 RX ADMIN — HEPARIN SODIUM 1400 UNITS: 1000 INJECTION INTRAVENOUS; SUBCUTANEOUS at 12:52

## 2023-03-08 RX ADMIN — SODIUM CHLORIDE, PRESERVATIVE FREE 10 ML: 5 INJECTION INTRAVENOUS at 05:51

## 2023-03-08 RX ADMIN — SEVELAMER CARBONATE 1600 MG: 800 TABLET, FILM COATED ORAL at 11:50

## 2023-03-08 RX ADMIN — SUCRALFATE 1 G: 1 TABLET ORAL at 21:57

## 2023-03-08 RX ADMIN — SEVELAMER CARBONATE 1600 MG: 800 TABLET, FILM COATED ORAL at 17:09

## 2023-03-08 RX ADMIN — HEPARIN SODIUM 5000 UNITS: 5000 INJECTION INTRAVENOUS; SUBCUTANEOUS at 21:57

## 2023-03-08 RX ADMIN — ROSUVASTATIN CALCIUM 40 MG: 40 TABLET, FILM COATED ORAL at 21:57

## 2023-03-08 RX ADMIN — CARVEDILOL 12.5 MG: 12.5 TABLET, FILM COATED ORAL at 09:03

## 2023-03-08 RX ADMIN — TAMSULOSIN HYDROCHLORIDE 0.4 MG: 0.4 CAPSULE ORAL at 09:03

## 2023-03-08 RX ADMIN — PANTOPRAZOLE SODIUM 40 MG: 40 TABLET, DELAYED RELEASE ORAL at 09:04

## 2023-03-08 RX ADMIN — ASPIRIN 81 MG: 81 TABLET, CHEWABLE ORAL at 09:03

## 2023-03-08 RX ADMIN — SODIUM CHLORIDE, PRESERVATIVE FREE 10 ML: 5 INJECTION INTRAVENOUS at 21:58

## 2023-03-08 RX ADMIN — LOSARTAN POTASSIUM 50 MG: 50 TABLET, FILM COATED ORAL at 09:02

## 2023-03-08 RX ADMIN — CARVEDILOL 12.5 MG: 12.5 TABLET, FILM COATED ORAL at 17:09

## 2023-03-08 RX ADMIN — FUROSEMIDE 40 MG: 40 TABLET ORAL at 09:04

## 2023-03-08 RX ADMIN — SEVELAMER CARBONATE 1600 MG: 800 TABLET, FILM COATED ORAL at 08:00

## 2023-03-08 RX ADMIN — HEPARIN SODIUM 5000 UNITS: 5000 INJECTION INTRAVENOUS; SUBCUTANEOUS at 09:04

## 2023-03-08 RX ADMIN — HYDRALAZINE HYDROCHLORIDE 100 MG: 50 TABLET, FILM COATED ORAL at 21:57

## 2023-03-08 RX ADMIN — DULOXETINE 60 MG: 30 CAPSULE, DELAYED RELEASE ORAL at 09:04

## 2023-03-08 RX ADMIN — DEXTROSE MONOHYDRATE 250 ML: 100 INJECTION, SOLUTION INTRAVENOUS at 07:44

## 2023-03-08 RX ADMIN — INSULIN GLARGINE 5 UNITS: 100 INJECTION, SOLUTION SUBCUTANEOUS at 21:58

## 2023-03-08 RX ADMIN — FUROSEMIDE 40 MG: 40 TABLET ORAL at 17:09

## 2023-03-08 RX ADMIN — HEPARIN SODIUM 1100 UNITS: 1000 INJECTION INTRAVENOUS; SUBCUTANEOUS at 12:53

## 2023-03-08 RX ADMIN — HYDRALAZINE HYDROCHLORIDE 100 MG: 50 TABLET, FILM COATED ORAL at 17:09

## 2023-03-08 RX ADMIN — SODIUM CHLORIDE 1 G: 900 INJECTION INTRAVENOUS at 17:10

## 2023-03-08 RX ADMIN — SUCRALFATE 1 G: 1 TABLET ORAL at 09:03

## 2023-03-08 RX ADMIN — SODIUM CHLORIDE, PRESERVATIVE FREE 10 ML: 5 INJECTION INTRAVENOUS at 14:36

## 2023-03-08 RX ADMIN — Medication 2 UNITS: at 17:09

## 2023-03-08 RX ADMIN — SUCRALFATE 1 G: 1 TABLET ORAL at 11:50

## 2023-03-08 RX ADMIN — HYDRALAZINE HYDROCHLORIDE 100 MG: 50 TABLET, FILM COATED ORAL at 09:03

## 2023-03-08 RX ADMIN — SUCRALFATE 1 G: 1 TABLET ORAL at 17:08

## 2023-03-08 NOTE — PROGRESS NOTES
Sterling Pritchard         NAME:Chapincito Bailey  IYM:[id number]   :1980     Patient seen on hd  Plan to remove 4 kg  D/w mother at bedside- regarding fluid restriction at home  He is gaining 8-12 lbs weight in between hd as out pt.     Visit Vitals  BP (!) 166/119   Pulse 63   Temp 98.2 °F (36.8 °C)   Resp 16   Wt 72.9 kg (160 lb 11.5 oz)   SpO2 95%   BMI 23.73 kg/m²     D/w HD NURSE        Acute on chronic respiratory failure with hypoxia [facility]) [J96.21]     Ac Fuentes, [facility]  [facility] [address], [place], [address] [place]  Phone - [phone]         Fax - [phone] [facility] Office  [address] [place] [address]  Phone - [phone]        Fax - [phone]

## 2023-03-08 NOTE — PROGRESS NOTES
Hospitalist Progress Note    NAME: Demetrio Ray   :  1980   MRN:  [id number]       Assessment / Plan: Altered mental status secondary to severe hypoglycemia  Uncontrolled diabetes mellitus with labile blood sugars  Patient was lethargic this morning as blood sugar was in 26, improved with D10 to 250 mL x 2  Was more alert oriented upon my evaluation  Initially was treated for DKA, now having hypoglycemias  Lantus reduced from 10 units to 5 units  Continue sliding scale insulin    S/p code blue on 3/7  Became responsive after round of CPR  Most likely due to too much pain medication  Pain medication was adjusted  As per mother, pt had few similar episodes at home    Acute on chronic hypoxic respiratory failure, chronically on 2 L/M  S/s fluid overload in the setting of missing HD  Pulmonary edema and B/L pleural effusion, L>right  Nephrology consulted. HD as per nephrology  Continue oxygen and wean as tolerated      Right back Pain:  Likely s/s pleural effusion vs right pyelonephritis. UA neg  renal/bladder US neg  Lower percocet dose to 2.5 mg, discontinue Toradol as it is contraindicated in hemodialysis patient  CT abd pelvis reviewed   IMPRESSION  1. Bilateral pleural effusions and lower lobe atelectasis. 2.  No evidence of nephrolithiasis or flank hematoma/injury  Hypertension:  Resume home medications- coreg, amlodipine, hydralazine, losartan      ESRD on HD  Nephrology consulted  HD as per nephrology  AVF clotted, Vascular consulted  New carlos cath inserted on 3/6     Suspected UTI  Started on ceftriaxone, follow-up urine cultures if able to obtain     Code Status: full code  Surrogate Decision Maker:mother   DVT Prophylaxis: sc heparin  18.5 - 24.9 Normal weight / Body mass index is 23.73 kg/m².   Recommended Disposition: Home w/Family  Anticipated Discharge Date: 03/10  SHAE Barriers: clinical improvement, wean off O2       Subjective:   Episode of altered mental status due to severe hypoglycemia  Patient was lethargic this morning, was more alert and awake after improvement of blood sugars  Still is on oxygen, denies any chest pain shortness of breath  Review of Systems:  Symptom Y/N Comments  Symptom Y/N Comments   Fever/Chills    Chest Pain     Poor Appetite    Edema     Cough    Abdominal Pain     Sputum    Joint Pain     SOB/JOHNSTON    Pruritis/Rash     Nausea/vomit    Tolerating PT/OT     Diarrhea    Tolerating Diet     Constipation    Other       PO intake: No data found. Wt Readings from Last 10 Encounters:   03/08/23 72.9 kg (160 lb 11.5 oz)   01/10/23 85.3 kg (188 lb)   12/29/22 78 kg (171 lb 15.3 oz)   12/27/22 78 kg (171 lb 15.3 oz)   12/21/22 78 kg (171 lb 15.3 oz)   12/06/22 80.6 kg (177 lb 11.1 oz)   11/02/22 71.8 kg (158 lb 6.4 oz)   10/25/22 69.2 kg (152 lb 8.9 oz)   09/28/22 75.8 kg (167 lb 3.2 oz)   09/13/22 72.1 kg (159 lb)       Objective:     VITALS:   Last 24hrs VS reviewed since prior progress note.  Most recent are:  Patient Vitals for the past 24 hrs:   Temp Pulse Resp BP SpO2   03/08/23 1600 97.9 °F (36.6 °C) 86 16 (!) 146/85 92 %   03/08/23 1245 -- 76 -- 133/71 97 %   03/08/23 1230 -- 70 -- 128/75 97 %   03/08/23 1215 -- 71 -- (!) 141/86 97 %   03/08/23 1200 98.1 °F (36.7 °C) 70 18 (!) 143/79 96 %   03/08/23 1145 -- 70 -- 115/79 96 %   03/08/23 1130 -- 69 -- 129/79 95 %   03/08/23 1115 -- 68 -- (!) 148/83 93 %   03/08/23 1100 -- 66 -- (!) 150/71 95 %   03/08/23 1045 -- 65 -- (!) 145/67 96 %   03/08/23 1030 -- 63 -- (!) 166/119 95 %   03/08/23 1015 -- 68 -- 134/67 96 %   03/08/23 1000 -- 62 -- 125/73 95 %   03/08/23 0945 -- 63 -- (!) 146/95 97 %   03/08/23 0930 -- 63 -- (!) 143/117 98 %   03/08/23 0915 -- 63 -- (!) 149/93 94 %   03/08/23 0902 -- 68 -- -- --   03/08/23 0845 -- (!) 58 -- (!) 149/79 99 %   03/08/23 0830 -- 60 -- 123/69 98 %   03/08/23 0815 -- 88 -- 120/81 98 %   03/08/23 0800 -- 69 -- 129/67 97 %   03/08/23 0745 98.2 °F (36.8 °C) 68 16 (!) 159/95 95 %   03/08/23 0400 98 °F (36.7 °C) 74 16 (!) 159/76 96 %   03/08/23 0000 98.2 °F (36.8 °C) 80 16 (!) 151/76 95 %   03/07/23 2000 -- -- -- -- 96 %   03/07/23 1941 98.1 °F (36.7 °C) 82 16 (!) 156/87 96 %         Intake/Output Summary (Last 24 hours) at 3/8/2023 1809  Last data filed at 3/8/2023 1600  Gross per 24 hour   Intake 860 ml   Output 4150 ml   Net -3290 ml          I had a face to face encounter, and independently examined this patient as outlined below:    PHYSICAL EXAM:  General:    No distress     HEENT: Atraumatic, anicteric sclerae, pink conjunctivae, MMM  Neck:  Supple, symmetrical  Lungs:   CTA. No Wheezing/Rhonchi. No rales. No tenderness. No Accessory muscle use. CVS:   Regular rhythm. No murmur. No JVD. L UE AVF w + thrill   GI/:   Soft. NT. ND. BS normal. Mild R CVAT  Extremities: No edema. No cyanosis. No clubbing. Skin:     Not pale. Not Jaundiced. No rashes   Psych:  Good insight. Not depressed. Not anxious or agitated. Neurologic: Alert and oriented X 4. EOMs intact. No facial asymmetry. No slurred speech. Symmetrical strength, Sensation grossly intact. Labs     I reviewed today's most current labs and imaging studies. Pertinent labs include:  Recent Labs     03/08/23  0920 03/07/23  0019   WBC 5.6 6.2   HGB 12.0* 10.8*   HCT 40.6 36.6    178       Recent Labs     03/08/23  0920 03/07/23  0004 03/06/23  0430   * 131* 132*   K 4.2 4.0 3.8   CL 97 95* 96*   CO2 27 27 27   * 210* 198*   BUN 28* 66* 56*   CREA 4.28* 7.11* 6.30*   CA 7.8* 7.8* 7.6*   MG  --   --  2.6*   PHOS 3.9  --   --    ALB 2.4*  --   --        CT ABD PELV W CONT    Result Date: 3/7/2023  1. Bilateral pleural effusions and lower lobe atelectasis. 2.  No evidence of nephrolithiasis or flank hematoma/injury.     US RETROPERITONEUM LTD    Result Date: 3/4/2023  Unremarkable Renal Sonogram.     XR CHEST PORT    Result Date: 3/4/2023  Pulmonary edema with moderate left and small right pleural effusions, and associated bibasilar airspace disease. IR INSERT NON TUNL CVC OVER 5 YRS    Result Date: 3/6/2023  1. Successful placement of right internal jugular temporary dialysis catheter. Catheter ready for immediate use. CT ABD PELV W CONT    Result Date: 3/7/2023  1. Bilateral pleural effusions and lower lobe atelectasis. 2.  No evidence of nephrolithiasis or flank hematoma/injury. 10/16/22    ECHO ADULT COMPLETE 10/27/2022 10/27/2022    Interpretation Summary    Left Ventricle: Normal left ventricular systolic function with a visually estimated EF of 45 - 50%. Left ventricle size is normal. Normal wall thickness. Normal wall motion. Aortic Valve: Tricuspid valve.     Signed by: Evonne Rojas MD on 10/27/2022  8:23 AM     All Micro Results       None              Current Medications:     Current Facility-Administered Medications:     oxyCODONE IR (ROXICODONE) tablet 2.5 mg, 2.5 mg, Oral, Q6H PRN, Selene Morton MD    heparin (porcine) 1,000 unit/mL injection 1,400 Units, 1,400 Units, InterCATHeter, DIALYSIS PRN, 1,400 Units at 03/08/23 1252 **AND** heparin (porcine) 1,000 unit/mL injection 1,100 Units, 1,100 Units, InterCATHeter, DIALYSIS PRN, Morgan Knox MD, 1,100 Units at 03/08/23 1253    albumin human 25% (BUMINATE) solution 12.5 g, 12.5 g, IntraVENous, DIALYSIS PRN, Morgan Knox MD    heparin (porcine) 1,000 unit/mL injection 2,500 Units, 2,500 Units, Hemodialysis, DIALYSIS PRN, Morgan Knox MD, 2,500 Units at 03/07/23 0313    sodium chloride (NS) flush 5-40 mL, 5-40 mL, IntraVENous, Q8H, Felix Morton MD, 10 mL at 03/08/23 1436    sodium chloride (NS) flush 5-40 mL, 5-40 mL, IntraVENous, PRN, Felix Morton MD    0.9% sodium chloride infusion 25 mL, 25 mL, IntraVENous, PRN, Selene Morton MD    insulin lispro (HUMALOG) injection, , SubCUTAneous, AC&HS, Fabio Jackman, CNS, 2 Units at 03/08/23 1709    glucose chewable tablet 16 g, 4 Tablet, Oral, PRN, Socrates Morton MD    glucagon (GLUCAGEN) injection 1 mg, 1 mg, IntraMUSCular, PRN, Socrates Morton MD    dextrose 10% infusion 0-250 mL, 0-250 mL, IntraVENous, PRN, Socrates Morton MD, 250 mL at 03/08/23 0744    cloNIDine HCL (CATAPRES) tablet 0.1 mg, 0.1 mg, Oral, Q6H PRN, Felix Morton MD, 0.1 mg at 03/08/23 4846    acetaminophen (TYLENOL) tablet 650 mg, 650 mg, Oral, Q6H PRN, 650 mg at 03/07/23 2129 **OR** acetaminophen (TYLENOL) suppository 650 mg, 650 mg, Rectal, Q6H PRN, Alexx Vidal MD    polyethylene glycol (MIRALAX) packet 17 g, 17 g, Oral, DAILY PRN, Alexx Vidal MD    ondansetron (ZOFRAN ODT) tablet 4 mg, 4 mg, Oral, Q8H PRN **OR** ondansetron (ZOFRAN) injection 4 mg, 4 mg, IntraVENous, Q6H PRN, Keila Gonzalez MD    amLODIPine (NORVASC) tablet 10 mg, 10 mg, Oral, DAILY, Keila Jaime MD, 10 mg at 03/08/23 9350    aspirin chewable tablet 81 mg, 81 mg, Oral, DAILY, Keila Jaime MD, 81 mg at 03/08/23 5961    carvediloL (COREG) tablet 12.5 mg, 12.5 mg, Oral, BID WITH MEALS, Keila Jaime MD, 12.5 mg at 03/08/23 1709    cloNIDine (CATAPRES) 0.1 mg/24 hr patch 1 Patch, 1 Patch, TransDERmal, Q7D, Keila Jaime MD    DULoxetine (CYMBALTA) capsule 60 mg, 60 mg, Oral, DAILY, Keila Jaime MD, 60 mg at 03/08/23 0904    finasteride (PROSCAR) tablet 5 mg, 5 mg, Oral, DAILY, Keila Jaime MD, 5 mg at 03/08/23 7442    hydrALAZINE (APRESOLINE) tablet 100 mg, 100 mg, Oral, TID, Alexx Vidal MD, 100 mg at 03/08/23 1709    losartan (COZAAR) tablet 50 mg, 50 mg, Oral, DAILY, Keila Jaime MD, 50 mg at 03/08/23 0902    pantoprazole (PROTONIX) tablet 40 mg, 40 mg, Oral, DAILY, Keila Jaime MD, 40 mg at 03/08/23 0904    pregabalin (LYRICA) capsule 75 mg, 75 mg, Oral, QHS, Keila Jaime MD, 75 mg at 03/07/23 2129    rosuvastatin (CRESTOR) tablet 40 mg, 40 mg, Oral, QHS, Suzanne Tenorio MD, 40 mg at 03/07/23 2129    sevelamer carbonate (RENVELA) tab 1,600 mg, 1,600 mg, Oral, TID WITH MEALS, Keila Jaime MD, 1,600 mg at 03/08/23 1709    sucralfate (CARAFATE) tablet 1 g, 1 g, Oral, AC&HS, Suzanne Tenorio MD, 1 g at 03/08/23 1708    tamsulosin (FLOMAX) capsule 0.4 mg, 0.4 mg, Oral, DAILY, Keila Jaime MD, 0.4 mg at 03/08/23 4986    heparin (porcine) injection 5,000 Units, 5,000 Units, SubCUTAneous, Q12H, Keila Jaime MD, 5,000 Units at 03/08/23 0904    insulin glargine (LANTUS) injection 10 Units, 10 Units, SubCUTAneous, QHS, Keila Jaime MD, 10 Units at 03/07/23 2130    furosemide (LASIX) tablet 40 mg, 40 mg, Oral, BID, Naima Langston MD, 40 mg at 03/08/23 1709     Procedures: see electronic medical records for all procedures/Xrays and details which were not copied into this note but were reviewed prior to creation of Plan. Reviewed most current lab test results and cultures  YES  Reviewed most current radiology test results   YES  Review and summation of old records today    NO  Reviewed patient's current orders and MAR    YES  PMH/ reviewed - no change compared to H&P  ________________________________________________________________________  Care Plan discussed with:    Comments   Patient x    Family  x    RN x    Care Manager x    Consultant                       x Multidiciplinary team rounds were held today with , nursing, pharmacist and clinical coordinator. Patient's plan of care was discussed; medications were reviewed and discharge planning was addressed.      ________________________________________________________________________  Total NON critical care TIME:   45 Minutes    Total CRITICAL CARE TIME Spent:Minutes non procedure based      Comments   >50% of visit spent in counseling and coordination of care x     This includes time during multidisciplinary rounds if indicated above ________________________________________________________________________  [facility] [place], MD

## 2023-03-08 NOTE — PROGRESS NOTES
SHIFT REPORT    0710: Bedside shift change report given to [address] (oncoming nurse) by Aki Segovia RN (offgoing nurse). Report included the following information SBAR, Kardex, Procedure Summary, Intake/Output, MAR, Recent Results, and Cardiac Rhythm NSR .     - CODE BLUE 3//7. 1 round CPR + Narcan. ROSC achieved, pt woke up. 0730: Dialysis RN at bedside to start scheduled HD.    0735: Notified by PCT that BG was 27. Repeat BG 26. Pt was lethargic, diaphoretic, slurred speech, and AMS. Arouses easily and Responds to voice. D10 given per protocol. Hospitalist, Dr. Kaia Robles, notified via [employer]. Additional 250mL D10 ordered. 0745: Shift assessment completed. - In bed, lethargic, slow slurred speech, decreased attention/following commands. AO x2  - 6L NC  - HD at bedside, on hold for Hypoglycemia this morning.   - 1500mL Fluid Restriction    LDA's:    - R GUNNAR OSPINA (placed 3/6)  - PIV x2      8593: Repeat . Pt is now alert, oriented, and with clear speech. Able to take morning PO meds. 7340: Repeat .     1200: Reassessment completed. Pt is alert and oriented. HD currently at bedside. 1255: HD completed. 4L removed. 1300: Voided 150mL urine. 1310: NC decreased to 4L. 1337: NC decreased to 2L    1600: Reassessment completed. No acute changes. 1915: Bedside shift change report given to Aki Segovia RN (oncoming nurse) by [address] (offgoing nurse). Report included the following information SBAR, Kardex, Intake/Output, MAR, Recent Results, and Cardiac Rhythm NSR .

## 2023-03-08 NOTE — PROGRESS NOTES
Transition of Care Plan:     RUR: 27%-High Risk  Disposition: Home with family support     Follow up appointments: PCP, Specialist  DME needed:  has rollator,Home oxygen - 2L- company unknown  Transportation at Discharge: Mother to provide transportation  [address] or means to access home: Mother  has access  IM Medicare Letter: 2nd IM Letter at d/c  Caregiver Contact: Judha Hightower Channel- [phone]  Discharge Caregiver contacted prior to discharge? If pt desires  Care Conference needed?:    no          CM reviewed pt alfaro. Pt not medially stable for d/c at this time.       [address]  Phone: [phone]

## 2023-03-08 NOTE — PROGRESS NOTES
Nephrology Progress Note  Formerly [facility] / [address], [place], [address]  Phone - [phone]  Fax - [phone]                 Patient: Brodie Redding                   YOB: 1980        Date- 3/8/2023                      Admit Date: 3/4/2023  CC: Follow up for ESRD      IMPRESSION & PLAN:   ESRD  - on hd mwf at [facility]  Hyponatremia  Fluid overload  Anemia of ckd  Pulmonary edema  hypertension  Uncontrolled DM  H/o Urinary retention requiring hansen cath in pasts  Type 1 diabetes  Non compliant to fluid restrictions. PLAN-  Hd today- remove 3-4 kg as tolerated  Fluid restriction 1500 ml/day  Continue norvasc, coreg, clonidine, hydralazine, losartan. Continue hd mwf  Follow bmp     Subjective: Interval History:   -3/8/23--- he has hypoglycemia this am-- now on d 10% gtt - s/p EXTRA hd yesterday - 3 kg fluid removed  3-7-23--he has clotted avg- he had new carlos cath placed yesterday-- he had hd last night  3-6-23--  bp stable--Sob improved- c/o right sided abdo pain-    Objective:   Vitals:    03/08/23 0815 03/08/23 0830 03/08/23 0845 03/08/23 0902   BP: 120/81 123/69 (!) 149/79    Pulse: 88 60 (!) 58 68   Resp:       Temp:       SpO2: 98% 98% 99%    Weight:          No intake/output data recorded. Last 3 Recorded Weights in this Encounter    03/05/23 0637 03/07/23 0525 03/08/23 0550   Weight: 76.2 kg (167 lb 15.9 oz) 73.8 kg (162 lb 11.2 oz) 72.9 kg (160 lb 11.5 oz)        Physical exam:    GEN:  NAD  NECK:  Supple, no thyromegaly  RESP: Clear  b/l, no  wheezing,   CVS: RRR,S1,S2   NEURO: non focal, normal speech  EXT: Edema +nt     Right ij carlos +  Left arm avg - no bruit or thrill    Chart reviewed. Pertinent Notes reviewed.      Data Review :  Recent Labs     03/07/23  0004 03/06/23  0430   * 132*   K 4.0 3.8   CL 95* 96*   CO2 27 27   BUN 66* 56*   CREA 7.11* 6.30* * 198*   CA 7.8* 7.6*   MG  --  2.6*       Recent Labs     03/07/23  0019   WBC 6.2   HGB 10.8*   HCT 36.6          No results for input(s): FE, TIBC, PSAT, FERR in the last 72 hours.    Lab Results   Component Value Date/Time    Hemoglobin A1c 12.3 (H) 03/04/2023 03:37 PM    Hemoglobin A1c 10.1 (H) 11/29/2022 08:48 PM    Hemoglobin A1c 7.2 (H) 09/26/2022 09:34 AM        Lab Results   Component Value Date/Time    Microalbumin/Creat ratio (mg/g creat) 11,439 (H) 04/01/2021 10:00 AM    Microalbumin,urine random 151.00 04/01/2021 10:00 AM     US Results (most recent):  Medication list  reviewed  Current Facility-Administered Medications   Medication Dose Route Frequency    oxyCODONE IR (ROXICODONE) tablet 2.5 mg  2.5 mg Oral Q6H PRN    heparin (porcine) 1,000 unit/mL injection 2,500 Units  2,500 Units Hemodialysis DIALYSIS PRN    ketorolac (TORADOL) injection 30 mg  30 mg IntraVENous Q6H PRN    sodium chloride (NS) flush 5-40 mL  5-40 mL IntraVENous Q8H    sodium chloride (NS) flush 5-40 mL  5-40 mL IntraVENous PRN    0.9% sodium chloride infusion 25 mL  25 mL IntraVENous PRN    insulin lispro (HUMALOG) injection   SubCUTAneous AC&HS    glucose chewable tablet 16 g  4 Tablet Oral PRN    glucagon (GLUCAGEN) injection 1 mg  1 mg IntraMUSCular PRN    dextrose 10% infusion 0-250 mL  0-250 mL IntraVENous PRN    cloNIDine HCL (CATAPRES) tablet 0.1 mg  0.1 mg Oral Q6H PRN    acetaminophen (TYLENOL) tablet 650 mg  650 mg Oral Q6H PRN    Or    acetaminophen (TYLENOL) suppository 650 mg  650 mg Rectal Q6H PRN    polyethylene glycol (MIRALAX) packet 17 g  17 g Oral DAILY PRN    ondansetron (ZOFRAN ODT) tablet 4 mg  4 mg Oral Q8H PRN    Or    ondansetron (ZOFRAN) injection 4 mg  4 mg IntraVENous Q6H PRN    amLODIPine (NORVASC) tablet 10 mg  10 mg Oral DAILY    aspirin chewable tablet 81 mg  81 mg Oral DAILY    carvediloL (COREG) tablet 12.5 mg  12.5 mg Oral BID WITH MEALS    cloNIDine (CATAPRES) 0.1 mg/24 hr patch 1 Patch  1 Patch TransDERmal Q7D    DULoxetine (CYMBALTA) capsule 60 mg  60 mg Oral DAILY    finasteride (PROSCAR) tablet 5 mg  5 mg Oral DAILY    hydrALAZINE (APRESOLINE) tablet 100 mg  100 mg Oral TID    losartan (COZAAR) tablet 50 mg  50 mg Oral DAILY    pantoprazole (PROTONIX) tablet 40 mg  40 mg Oral DAILY    pregabalin (LYRICA) capsule 75 mg  75 mg Oral QHS    rosuvastatin (CRESTOR) tablet 40 mg  40 mg Oral QHS    sevelamer carbonate (RENVELA) tab 1,600 mg  1,600 mg Oral TID WITH MEALS    sucralfate (CARAFATE) tablet 1 g  1 g Oral AC&HS    tamsulosin (FLOMAX) capsule 0.4 mg  0.4 mg Oral DAILY    heparin (porcine) injection 5,000 Units  5,000 Units SubCUTAneous Q12H    insulin glargine (LANTUS) injection 10 Units  10 Units SubCUTAneous QHS    cefTRIAXone (ROCEPHIN) 1 g in 0.9% sodium chloride (MBP/ADV) 50 mL MBP  1 g IntraVENous Q24H    furosemide (LASIX) tablet 40 mg  40 mg Oral BID        Jc Brizuela MD  3/8/2023

## 2023-03-08 NOTE — PROGRESS NOTES
1224 Pts pressure were sys 150 over. Gave clonidine. End of Shift Note    Bedside shift change report given to Marissa Green by Peña Rand RN (offgoing nurse). Report included the following information SBAR and Recent Results    Shift worked:  7p-7a     Shift summary and any significant changes:    Blood pressure is elevated     Concerns for physician to address: no     Zone phone for oncoming shift:  [phone]       Activity:  Activity Level: Bed Rest  Number times ambulated in hallways past shift: 0  Number of times OOB to chair past shift: 0    Cardiac:   Cardiac Monitoring: Yes      Cardiac Rhythm: Sinus Rhythm    Access:  Current line(s): HD access     Genitourinary:   Urinary status: voiding    Respiratory:   O2 Device: Nasal cannula  Chronic home O2 use?: YES  Incentive spirometer at bedside: NO       GI:  Last Bowel Movement Date: 03/03/23 (patient state)  Current diet:  ADULT DIET Regular; 3 carb choices (45 gm/meal); Low Potassium (Less than 3000 mg/day); Low Phosphorus (Less than 1000 mg)  DIET ONE TIME MESSAGE  DIET ONE TIME MESSAGE  Passing flatus: YES  Tolerating current diet: YES       Pain Management:   Patient states pain is manageable on current regimen: YES    Skin:  Corey Score: 17  Interventions: speciality bed and float heels    Patient Safety:  Fall Score:  Total Score: 2  Interventions: bed/chair alarm and gripper socks       Length of Stay:  Expected LOS: 3d 9h  Actual LOS: 1941 Kelin Snow RN

## 2023-03-08 NOTE — DIABETES MGMT
[facility]    CLINICAL NURSE SPECIALIST CONSULT     Initial Presentation   Roderick Padgett is a 38 y.o. male admitted 3/4/2023 after experiencing right flank pain and hyperglycemia. The patient is M,W, F  HD patient and he reportedly missed his Friday ( 3/3/23)dialysis. LAB:Glucose 595. Creatine 6.80. GFR 10. A1c 12.3%  CXR:Study Result    Narrative & Impression   INDICATION: vol overload       EXAM:  AP CHEST RADIOGRAPH     COMPARISON: 12/3/2022     FINDINGS:     AP portable view of the chest demonstrates interval removal of right IJ  permacath. Heart size is likely normal, though partly obscured by bibasilar  airspace disease and moderate left/small right pleural effusions. Mild pulmonary  edema. No pneumothorax. The osseous structures are unremarkable. IMPRESSION  Pulmonary edema with moderate left and small right pleural effusions, and  associated bibasilar airspace disease. CT/MRI:    HX:   Past Medical History:   Diagnosis Date    Bipolar 1 disorder, depressed ([address]     Bipolar disorder ([address]     Chronic kidney disease, stage 3a ([address]     Depression     Diabetes ([address]     DKA, type 1 ([address] 1/27/2013    diagnosed age 23    DKA, type 1 ([address]     H/O noncompliance with medical treatment, presenting hazards to health     MRSA (methicillin resistant staph aureus) culture positive     MRSA (methicillin resistant Staphylococcus aureus)     Face    Noncompliance with medication regimen     Second hand smoke exposure     Seizure ([address]     Seizures ([address] 2006 or 2007    one episode during skilled nursing        INITIAL DX:   Acute on chronic respiratory failure with hypoxia ([address] [J96.21]     Current Treatment     TX: BP management. ASA. Coreg. Abx. Catapres. Cymbalta. Lasix. Apresolinew. Insulin lostartan. Protonix. Lyrica. Crestor. Crafate.      Consulted by Provider for advanced diabetes nursing assessment and care for:   [] Transitioning off [facility]   [x] Inpatient management strategy  [x] Home management assessment  [] Survival skill education    Hospital Course   Clinical progress has been uncomplicated . Diabetes History   The patient reports a 20 year history of Type 1 diabetes. He has a positive family hx of diabetes (mom & niece). He states today that he sees Dr. Moustapha Landaverde (endocrinologist) for his diabetes. He no longer uses an insulin pump and is back on insulin injections. He uses 10 units Lantus nightly and sliding scale Humalog with meals. Diabetes-related Medical History  Acute complications  DKA  Neurological complications  Gastroparesis and Peripheral neuropathy  Microvascular disease  Nephropathy  Macrovascular disease  Myocardial infarction    Diabetes Medication History  Key Antihyperglycemic Medications               insulin glargine (LANTUS,BASAGLAR) 100 unit/mL (3 mL) inpn [place] 10 units subcut daily    insulin lispro (HUMALOG) 100 unit/mL kwikpen Administer 10 units before each meal             Diabetes self-management practices:   Eating pattern Deferred     Physical activity pattern Deferred     Monitoring pattern   [x] Testing BGs sufficiently to inform self-management adjustments    Taking medications pattern  [x] Inconsistent administration  [x] Affordable    Overall evaluation:    [x] Not achieving A1c target with drug therapy & self-care practices    Subjective   It's scary when you can't breathe.      Objective   Physical exam  General Normal weight male in no acute distress.  Conversant and cooperative  Neuro  Alert, oriented   Vital Signs Visit Vitals  BP (!) 166/119   Pulse 63   Temp 98.2 °F (36.8 °C)   Resp 16   Wt 72.9 kg (160 lb 11.5 oz)   SpO2 95%   BMI 23.73 kg/m²         Laboratory  Recent Labs     03/08/23  0920 03/07/23  0019 03/07/23  0004 03/06/23  0430   *  --  210* 198*   AGAP 5  --  9 9   WBC 5.6 6.2  --   --    CREA 4.28*  --  7.11* 6.30*         Factors impacting BG management  Factor Dose Comments   Nutrition:  Standard meals 60 grams/meal      Pain PRN acetaminophen    Infection Rocephin    Other:   Kidney function  Liver function     ESRD  AST 9 ( 3/4/2023)      Blood glucose pattern      Significant diabetes-related events over the past 24-72 hours  Transitioned off glucostabilizer on 3/4/23 with 10 units Lantus    Assessment and Plan   Nursing Diagnosis Risk for unstable blood glucose pattern   Nursing Intervention Domain 8086 Decision-making Support   Nursing Interventions Examined current inpatient diabetes/blood glucose control   Explored factors facilitating and impeding inpatient management  Explored corrective strategies with patient and responsible inpatient provider   Informed patient of rational for insulin strategy while hospitalized       Evaluation   This 38year old male did not achieve BG control prior to admission as evidenced by an A1c of 12.3%. The patient is using 10 units Lantus nightly at home, as well sliding scale Humalog. The patient reports that he takes his basal dose consistently but sometimes skips Humalog. The patient has a hx of many past admissions for DKA. The patient was using the insulin pump but has been taken off and put back on insulin injections by his endocrinologist. He is now ordered is home dose of 10 units Lantus daily and I agree with this strategy. The patient had a hypoglycemic episode in the early morning. The patient received 7 units correction insulin last evening with his basal dose. I suspect the additional corrective insulin may be the culprit for the hypoglycemia. I have changed the corrective insulin from resistant to high sensitivity. I recommend keeping basal dose at 10 units nightly.        Orders/Plan   Continue 10 units Lantus  Use high sensitivity correction        Williams Jalloh CNS  Diabetes Clinical Nurse Specialist  [facility]  Access via [employer]

## 2023-03-08 NOTE — PROGRESS NOTES
Vascular Surgery Progress Note  Jose Ramon, AGACNP-BC    Admit Date: 3/4/2023   LOS: 4 days      Daily Progress Note: 3/8/2023      Subjective: Claudia Ramirez is a 38 y.o. male with PMHx significant for ESRD (on HD MWF), HTN and DM Type I who we are following for clotted dialysis access. He presented to [facility] from [facility] on 3/4/23 with complaints of right flank pain, dypsnea with pleural effusions, elevatd blood glucose, and missed dialysis and was admitted for ARF, pulmonary edema and hyperglycemia. This admission, his graft stopped functioning. He is s/p LUE AVG with Dr. Driss Payne on 22. He had R IJ central line placed on 3/6/23 with IR and tolerated HD yesterday. Code called yesterday for over-narcotized event, ROSC obtained  with compressions and Narcan. This morning, he was hypoglycemic prior to morning HD which is on hold. Objective:     Vital signs  Temp (24hrs), Av.4 °F (36.9 °C), Min:98 °F (36.7 °C), Max:99.1 °F (37.3 °C)   No intake/output data recorded.  1901 -  0700  In: 240 [P.O.:240]  Out: 3525 [Urine:25]    Visit Vitals  BP (!) 149/93   Pulse 63   Temp 98.2 °F (36.8 °C)   Resp 16   Wt 72.9 kg (160 lb 11.5 oz)   SpO2 94%   BMI 23.73 kg/m²    O2 Flow Rate (L/min): 6 l/min O2 Device: Nasal cannula     Pain control  Pain Assessment  Pain Scale 1: Numeric (0 - 10)  Pain Intensity 1: 6  Pain Onset 1: acute  Pain Location 1: Flank  Pain Orientation 1: Right  Pain Description 1: Aching, Constant  Pain Intervention(s) 1: Medication (see MAR), Repositioned, Relaxation technique    PT/OT  Gait                 Physical Exam  Vitals and nursing note reviewed. Constitutional:       General: He is not in acute distress. Appearance: He is ill-appearing. HENT:      Head: Normocephalic. Eyes:      General: No scleral icterus. Cardiovascular:      Rate and Rhythm: Normal rate. Arteriovenous access: Left arteriovenous access is present.      Comments: TAMRA HER without palpable thrill and faint bruit  Musculoskeletal:      Cervical back: Normal range of motion. Neurological:      Mental Status: He is alert.           24 hour results:    Recent Results (from the past 24 hour(s))   GLUCOSE, POC    Collection Time: 03/07/23 12:05 PM   Result Value Ref Range    Glucose (POC) 88 65 - 117 mg/dL    Performed by Ayden Tracy RN    GLUCOSE, POC    Collection Time: 03/07/23  4:35 PM   Result Value Ref Range    Glucose (POC) 57 (L) 65 - 117 mg/dL    Performed by Mirtha Eli    GLUCOSE, POC    Collection Time: 03/07/23  4:43 PM   Result Value Ref Range    Glucose (POC) 57 (L) 65 - 117 mg/dL    Performed by Mirtha Eli    GLUCOSE, POC    Collection Time: 03/07/23  5:04 PM   Result Value Ref Range    Glucose (POC) 60 (L) 65 - 117 mg/dL    Performed by Mirtha Eli    GLUCOSE, POC    Collection Time: 03/07/23  6:07 PM   Result Value Ref Range    Glucose (POC) 294 (H) 65 - 117 mg/dL    Performed by Ayden Tracy RN    GLUCOSE, POC    Collection Time: 03/07/23  9:18 PM   Result Value Ref Range    Glucose (POC) 263 (H) 65 - 117 mg/dL    Performed by [facility], POC    Collection Time: 03/08/23  7:35 AM   Result Value Ref Range    Glucose (POC) 27 (LL) 65 - 117 mg/dL    Performed by Beth Mckeon PCT    GLUCOSE, POC    Collection Time: 03/08/23  7:38 AM   Result Value Ref Range    Glucose (POC) 26 (LL) 65 - 117 mg/dL    Performed by Beth Mckeon PCT    GLUCOSE, POC    Collection Time: 03/08/23  8:35 AM   Result Value Ref Range    Glucose (POC) 233 (H) 65 - 117 mg/dL    Performed by Emeterio Mendez (GO RN)    GLUCOSE, POC    Collection Time: 03/08/23  9:02 AM   Result Value Ref Range    Glucose (POC) 245 (H) 65 - 117 mg/dL    Performed by Emeterio Mendez (GO RN)    CBC W/O DIFF    Collection Time: 03/08/23  9:20 AM   Result Value Ref Range    WBC 5.6 4.1 - 11.1 K/uL    RBC 4.81 4.10 - 5.70 M/uL    HGB 12.0 (L) 12.1 - 17.0 g/dL    HCT 40.6 36.6 - 50.3 %    MCV 84.4 80.0 - 99.0 FL    MCH 24.9 (L) 26.0 - 34.0 PG    MCHC 29.6 (L) 30.0 - 36.5 g/dL    RDW 15.3 (H) 11.5 - 14.5 %    PLATELET 522 848 - 853 K/uL    MPV 11.7 8.9 - 12.9 FL    NRBC 0.0 0  WBC    ABSOLUTE NRBC 0.00 0.00 - 0.01 K/uL          Assessment/Plan:     Active Problems:    Acute on chronic respiratory failure with hypoxia (HCC) (3/4/2023)      ESRD on HD  Electrolyte imbalance  Anemia of CKD  Missed HD  Fluid overload  - HD management per nephrology  Dialysis access complication  S/p LUE AVG 12/2022  - clotted with no thrill and faint bruit  - has R IJ placed in IR 3/6/23  - tentative plan for declot Friday 3/10/23    Acute on chronic hypoxic respiratory failure  Pulmonary edema and B/L pleural effusion, L>right    code blue 3/7 for over-narcotization, ROSC with compressions and narcan       DM1, uncontrolled  - management per primary team     Right flank/abdominal pain  - management per primary team  - UA negative, Renal U/S negative     HTN      Activity:  DVT ppx:  Dispo:         Olivia Graham NP

## 2023-03-08 NOTE — PROGRESS NOTES
End of Shift Note    Bedside shift change report given to Lara Meadows 44 (oncoming nurse) by Dani Gu RN (offgoing nurse). Report included the following information SBAR, MAR, Med Rec Status, Cardiac Rhythm NSR, and Alarm Parameters     Shift worked:  7A-7P     Shift summary and any significant changes:     Patient coded after taking PRN dose of Oxycodine. CPR adminstered. Narcan given with immediate ROSC. Oxycode discontinued. CT done of Right Flank region for complaints of pain. Concerns for physician to address:  None     Zone phone for oncoming shift:   None       Activity:  Activity Level: Bed Rest  Number times ambulated in hallways past shift: 0  Number of times OOB to chair past shift: 0    Cardiac:   Cardiac Monitoring: Yes      Cardiac Rhythm: Sinus Rhythm    Access:  Current line(s): PIV     Genitourinary:   Urinary status: anuric    Respiratory:   O2 Device: Nasal cannula  Chronic home O2 use?: YES  Incentive spirometer at bedside: N/A       GI:  Last Bowel Movement Date: 03/03/23 (patient state)  Current diet:  ADULT DIET Regular; 3 carb choices (45 gm/meal); Low Potassium (Less than 3000 mg/day); Low Phosphorus (Less than 1000 mg)  DIET ONE TIME MESSAGE  DIET ONE TIME MESSAGE  Passing flatus: YES  Tolerating current diet: YES       Pain Management:   Patient states pain is manageable on current regimen: YES    Skin:  Corey Score: 17  Interventions: increase time out of bed    Patient Safety:  Fall Score:  Total Score: 2  Interventions: bed/chair alarm       Length of Stay:  Expected LOS: 3d 9h  Actual LOS: [place] ISAURA Prater, RN

## 2023-03-08 NOTE — PROGRESS NOTES
Hospitalist Progress Note    NAME: Liss Mcfadden   :  1980   MRN:  [id number]       Assessment / Plan:  Code blue on 3/7  Pt was pulses, blue, Sat dropping from 90s to 70s, Code blue called  Upon arrival pt was cyanotic, unresponsive, started CPR right away. Pt given perocet one hour earlier. After onr round of CPR, pt noted to have strong pulses, given narcan and pt woke up in few seconds. Cut done percocet, add toradol for pain  As per mother, pt had few similar episodes at home    Acute on chronic hypoxic respiratory failure, chronically on 2 L/M  S/s fluid overload in the setting of missing HD  Pulmonary edema and B/L pleural effusion, L>right  Nephrology consulted. HD as per nephrology  Continue oxygen and wean as tolerated     Uncontrolled blood sugar  Type I DM uncontrolled  Has elevated blood sugar without anion gap. VBG shows acidosis. off insulin drip  lantus 10 units   ISS  A1c 12     Right back Pain:  Likely s/s pleural effusion vs right pyelonephritis. UA neg  renal/bladder US neg  Lower percocet dose to 2.5 mg, add Toradol  CT abd pelvis ordered      Hypertension:  Resume home medications- coreg, amlodipine, hydralazine, losartan      ESRD on HD  Nephrology consulted  HD as per nephrology  AVF clotted, Vascular consulted  New carlos cath inserted on 3/6     Suspected UTI  UA is neg. Patient reports he makes little urine. Reports that he needs to drink more water to pee. Will start ceftriaxone after UA is collected. enal/bladder US neg  Will get CT abdomen and pelvis if patient continues to have right flank/back pain after HD and fluid removal.      Code Status: full code  Surrogate Decision Maker:mother   DVT Prophylaxis: sc heparin  18.5 - 24.9 Normal weight / Body mass index is 24.03 kg/m².   Recommended Disposition: Home w/Family  Anticipated Discharge Date:  48 hours   SHAE Barriers: clinical improvement, wean off O2       Subjective:     Discussed with RN events overnight. Right flank pain improving  No N/V  Afebrile  No diarrhea  No hypoxia     Review of Systems:  Symptom Y/N Comments  Symptom Y/N Comments   Fever/Chills    Chest Pain     Poor Appetite    Edema     Cough    Abdominal Pain     Sputum    Joint Pain     SOB/JOHNSTON    Pruritis/Rash     Nausea/vomit    Tolerating PT/OT     Diarrhea    Tolerating Diet     Constipation    Other       PO intake: No data found. Wt Readings from Last 10 Encounters:   03/07/23 73.8 kg (162 lb 11.2 oz)   01/10/23 85.3 kg (188 lb)   12/29/22 78 kg (171 lb 15.3 oz)   12/27/22 78 kg (171 lb 15.3 oz)   12/21/22 78 kg (171 lb 15.3 oz)   12/06/22 80.6 kg (177 lb 11.1 oz)   11/02/22 71.8 kg (158 lb 6.4 oz)   10/25/22 69.2 kg (152 lb 8.9 oz)   09/28/22 75.8 kg (167 lb 3.2 oz)   09/13/22 72.1 kg (159 lb)       Objective:     VITALS:   Last 24hrs VS reviewed since prior progress note.  Most recent are:  Patient Vitals for the past 24 hrs:   Temp Pulse Resp BP SpO2   03/08/23 0400 -- 74 -- (!) 159/76 96 %   03/08/23 0000 98.2 °F (36.8 °C) 80 16 (!) 151/76 95 %   03/07/23 2000 -- -- -- -- 96 %   03/07/23 1941 98.1 °F (36.7 °C) 82 16 (!) 156/87 96 %   03/07/23 1545 -- 83 -- (!) 157/71 95 %   03/07/23 1530 -- -- -- 139/74 95 %   03/07/23 1515 -- -- -- (!) 150/138 96 %   03/07/23 1500 98.7 °F (37.1 °C) 81 18 (!) 158/76 95 %   03/07/23 1445 -- 79 -- (!) 146/109 94 %   03/07/23 1430 -- 80 -- (!) 151/67 93 %   03/07/23 1415 -- 80 -- (!) 154/60 93 %   03/07/23 1400 -- 81 -- (!) 159/66 94 %   03/07/23 1345 -- 81 -- (!) 145/58 93 %   03/07/23 1330 -- 78 -- (!) 148/58 92 %   03/07/23 1315 -- 78 -- 129/64 92 %   03/07/23 1300 -- 81 -- 128/64 90 %   03/07/23 1245 -- 79 -- (!) 143/69 (!) 89 %   03/07/23 1200 99.1 °F (37.3 °C) 79 18 (!) 152/62 98 %   03/07/23 1125 -- 82 -- -- 96 %   03/07/23 0945 -- 76 -- -- 93 %   03/07/23 0800 -- 82 16 (!) 151/84 93 %       No intake or output data in the 24 hours ending 03/08/23 0437       I had a face to face encounter, and independently examined this patient as outlined below:    PHYSICAL EXAM:  General:    No distress     HEENT: Atraumatic, anicteric sclerae, pink conjunctivae, MMM  Neck:  Supple, symmetrical  Lungs:   CTA. No Wheezing/Rhonchi. No rales. No tenderness. No Accessory muscle use. CVS:   Regular rhythm. No murmur. No JVD. L UE AVF w + thrill   GI/:   Soft. NT. ND. BS normal. Mild R CVAT  Extremities: No edema. No cyanosis. No clubbing. Skin:     Not pale. Not Jaundiced. No rashes   Psych:  Good insight. Not depressed. Not anxious or agitated. Neurologic: Alert and oriented X 4. EOMs intact. No facial asymmetry. No slurred speech. Symmetrical strength, Sensation grossly intact. Labs     I reviewed today's most current labs and imaging studies. Pertinent labs include:  Recent Labs     03/07/23  0019   WBC 6.2   HGB 10.8*   HCT 36.6          Recent Labs     03/07/23  0004 03/06/23  0430   * 132*   K 4.0 3.8   CL 95* 96*   CO2 27 27   * 198*   BUN 66* 56*   CREA 7.11* 6.30*   CA 7.8* 7.6*   MG  --  2.6*       CT ABD PELV W CONT    Result Date: 3/7/2023  1. Bilateral pleural effusions and lower lobe atelectasis. 2.  No evidence of nephrolithiasis or flank hematoma/injury. US RETROPERITONEUM LTD    Result Date: 3/4/2023  Unremarkable Renal Sonogram.     XR CHEST PORT    Result Date: 3/4/2023  Pulmonary edema with moderate left and small right pleural effusions, and associated bibasilar airspace disease. IR INSERT NON TUNL CVC OVER 5 YRS    Result Date: 3/6/2023  1. Successful placement of right internal jugular temporary dialysis catheter. Catheter ready for immediate use. CT ABD PELV W CONT    Result Date: 3/7/2023  1. Bilateral pleural effusions and lower lobe atelectasis. 2.  No evidence of nephrolithiasis or flank hematoma/injury.    10/16/22    ECHO ADULT COMPLETE 10/27/2022 10/27/2022    Interpretation Summary    Left Ventricle: Normal left ventricular systolic function with a visually estimated EF of 45 - 50%. Left ventricle size is normal. Normal wall thickness. Normal wall motion. Aortic Valve: Tricuspid valve.     Signed by: Mckayla Mehta MD on 10/27/2022  8:23 AM     All Micro Results       None              Current Medications:     Current Facility-Administered Medications:     oxyCODONE IR (ROXICODONE) tablet 2.5 mg, 2.5 mg, Oral, Q6H PRN, Gabbi Morton MD    heparin (porcine) 1,000 unit/mL injection 2,500 Units, 2,500 Units, Hemodialysis, DIALYSIS PRN, Fransisco Armstrong MD, 2,500 Units at 03/07/23 0313    ketorolac (TORADOL) injection 30 mg, 30 mg, IntraVENous, Q6H PRN, Gabbi Morton MD, 30 mg at 03/07/23 1715    sodium chloride (NS) flush 5-40 mL, 5-40 mL, IntraVENous, Q8H, Gabbi Morton MD, 10 mL at 03/07/23 2134    sodium chloride (NS) flush 5-40 mL, 5-40 mL, IntraVENous, PRN, Gabbi Morton MD    0.9% sodium chloride infusion 25 mL, 25 mL, IntraVENous, PRN, Gabbi Morton MD    insulin lispro (HUMALOG) injection, , SubCUTAneous, AC&HS, Gabbi Morton MD, 7 Units at 03/07/23 2129    glucose chewable tablet 16 g, 4 Tablet, Oral, PRN, Gabbi Morton MD    glucagon (GLUCAGEN) injection 1 mg, 1 mg, IntraMUSCular, PRN, Gabbi Morton MD    dextrose 10% infusion 0-250 mL, 0-250 mL, IntraVENous, PRN, Gabbi Morton MD    cloNIDine HCL (CATAPRES) tablet 0.1 mg, 0.1 mg, Oral, Q6H PRN, Felix Morton MD, 0.1 mg at 03/08/23 0026    sodium chloride (NS) flush 5-40 mL, 5-40 mL, IntraVENous, PRN, Christian Jhaveri MD, 10 mL at 03/07/23 1458    acetaminophen (TYLENOL) tablet 650 mg, 650 mg, Oral, Q6H PRN, 650 mg at 03/07/23 2129 **OR** acetaminophen (TYLENOL) suppository 650 mg, 650 mg, Rectal, Q6H PRN, Keila De La oTrre MD    polyethylene glycol (MIRALAX) packet 17 g, 17 g, Oral, DAILY PRN, Christian Jhaveri MD ondansetron (ZOFRAN ODT) tablet 4 mg, 4 mg, Oral, Q8H PRN **OR** ondansetron (ZOFRAN) injection 4 mg, 4 mg, IntraVENous, Q6H PRN, Keila Disla MD    amLODIPine (NORVASC) tablet 10 mg, 10 mg, Oral, DAILY, AddisonKeila MD, 10 mg at 03/07/23 0845    aspirin chewable tablet 81 mg, 81 mg, Oral, DAILY, AddisonKeila MD, 81 mg at 03/07/23 0845    carvediloL (COREG) tablet 12.5 mg, 12.5 mg, Oral, BID WITH MEALS, Reji Kumar MD, 12.5 mg at 03/07/23 1657    cloNIDine (CATAPRES) 0.1 mg/24 hr patch 1 Patch, 1 Patch, TransDERmal, Q7D, Reji Kumar MD    DULoxetine (CYMBALTA) capsule 60 mg, 60 mg, Oral, DAILY, Reji Kumar MD, 60 mg at 03/07/23 0844    finasteride (PROSCAR) tablet 5 mg, 5 mg, Oral, DAILY, Reji Kumar MD, 5 mg at 03/07/23 0845    hydrALAZINE (APRESOLINE) tablet 100 mg, 100 mg, Oral, TID, Reji Kumar MD, 100 mg at 03/07/23 2129    losartan (COZAAR) tablet 50 mg, 50 mg, Oral, DAILY, Reji Kumar MD, 50 mg at 03/07/23 0846    pantoprazole (PROTONIX) tablet 40 mg, 40 mg, Oral, DAILY, Reji Kumar MD, 40 mg at 03/07/23 0845    pregabalin (LYRICA) capsule 75 mg, 75 mg, Oral, QHS, Reji Kumar MD, 75 mg at 03/07/23 2129    rosuvastatin (CRESTOR) tablet 40 mg, 40 mg, Oral, QHS, Reji Kumar MD, 40 mg at 03/07/23 2129    sevelamer carbonate (RENVELA) tab 1,600 mg, 1,600 mg, Oral, TID WITH MEALS, Addison, Keila, MD, 1,600 mg at 03/07/23 1657    sucralfate (CARAFATE) tablet 1 g, 1 g, Oral, AC&HS, Reji Kumar MD, 1 g at 03/07/23 2129    tamsulosin (FLOMAX) capsule 0.4 mg, 0.4 mg, Oral, DAILY, Reji Kumar MD, 0.4 mg at 03/07/23 0845    heparin (porcine) injection 5,000 Units, 5,000 Units, SubCUTAneous, Q12H, Keila Jaime MD, 5,000 Units at 03/07/23 2133    insulin glargine (LANTUS) injection 10 Units, 10 Units, SubCUTAneous, QHS, Reji Kumar MD, 10 Units at 03/07/23 2130    cefTRIAXone (ROCEPHIN) 1 g in 0.9% sodium chloride (MBP/ADV) 50 mL MBP, 1 g, IntraVENous, Q24H, Reji Kumar MD, Last Rate: 100 mL/hr at 03/07/23 1702, 1 g at 03/07/23 1702    furosemide (LASIX) tablet 40 mg, 40 mg, Oral, BID, Rosy Redman MD, 40 mg at 03/07/23 1700     Procedures: see electronic medical records for all procedures/Xrays and details which were not copied into this note but were reviewed prior to creation of Plan. Reviewed most current lab test results and cultures  YES  Reviewed most current radiology test results   YES  Review and summation of old records today    NO  Reviewed patient's current orders and MAR    YES  PMH/ reviewed - no change compared to H&P  ________________________________________________________________________  Care Plan discussed with:    Comments   Patient x    Family  x    RN x    Care Manager x    Consultant  x ICU on call                    x Multidiciplinary team rounds were held today with , nursing, pharmacist and clinical coordinator. Patient's plan of care was discussed; medications were reviewed and discharge planning was addressed.      ________________________________________________________________________  Total NON critical care TIME:    Minutes    Total CRITICAL CARE TIME Spent: 60  Minutes non procedure based      Comments   >50% of visit spent in counseling and coordination of care x     This includes time during multidisciplinary rounds if indicated above   ________________________________________________________________________  Stanley Cantu MD

## 2023-03-08 NOTE — PROGRESS NOTES
Problem: Risk for Spread of Infection  Goal: Prevent transmission of infectious organism to others  Description: Prevent the transmission of infectious organisms to other patients, staff members, and visitors. Outcome: Progressing Towards Goal     Problem: Patient Education:  Go to Education Activity  Goal: Patient/Family Education  Outcome: Progressing Towards Goal     Problem: Falls - Risk of  Goal: *Absence of Falls  Description: Document Reggie Johnson Fall Risk and appropriate interventions in the flowsheet.   Outcome: Progressing Towards Goal  Note: Fall Risk Interventions:            Medication Interventions: Bed/chair exit alarm

## 2023-03-08 NOTE — PROCEDURES
[facility]                      [phone]  Vitals Pre Post Assessment Pre Post   /93 133/71 LOC flowsheet flowsheet   HR 63 76 Lungs flowsheet flowsheet   Resp 20 20 Cardiac flowsheet flowsheet   Temp 98.5 F 97.5 F Skin flowsheet flowsheet   Weight   Edema flowsheet flowsheet   Pain Pain Intensity 1: 6 (03/08/23 0745) 6/10 Tele Status Bedside monitor Bedside monitor     Orders   Duration: Start: 0915 End: 1245 Total: 3.5 hrs   Dialyzer: Dialyzer/Set Up Inspection: Dairl Remak (03/08/23 0915)   K Bath: Dialysate K (mEq/L): 2 (03/07/23 1245)   Ca Bath: Dialysate CA (mEq/L): 2.5 (03/08/23 0915)   Na: Dialysate NA (mEq/L): 138 (03/08/23 0915)   Bicarb:  35   Target Fluid Removal: Goal/Amount of Fluid to Remove (mL): 4000 mL (03/08/23 0915)     Access   Type & Location: R CVC   Comments:  Access Dressing is clean and intact, no evidence of used and dated. Each catheter limb disinfected per p&p, caps removed, hubs disinfected per p&p. Both lumen flushes without issues.                                        Labs   HBsAg (Antigen) / date: Negative (2/15/23)                            HBsAb (Antibody) / date: Susceptible (9/14/23)   Source: Physician portal   Obtained/Reviewed  Critical Results Called HGB   Date Value Ref Range Status   03/08/2023 12.0 (L) 12.1 - 17.0 g/dL Final     Potassium   Date Value Ref Range Status   03/07/2023 4.0 3.5 - 5.1 mmol/L Final     Calcium   Date Value Ref Range Status   03/07/2023 7.8 (L) 8.5 - 10.1 MG/DL Final     BUN   Date Value Ref Range Status   03/07/2023 66 (H) 6 - 20 MG/DL Final     Creatinine   Date Value Ref Range Status   03/07/2023 7.11 (H) 0.70 - 1.30 MG/DL Final        Meds Given   Name Dose Route   heparin Art 1.1 ml /Arian 1.4 ml IVD               Adequacy / Fluid    Total Liters Process:                    67 Liters   Net Fluid Removed:                  4000  ml      Comments   Time Out Done: 0910   Admitting Diagnosis: Acute on chronic respiratory failure with hypoxia   Consent obtained/signed: Informed Consent Verified: Yes (23)   Machine / RO # Machine Number: [id number] (23)   Primary Nurse Rpt Pre: Gilma Nash RN   Primary Nurse Rpt Post: Gilma Nash RN   Pt Education: Fluid restrictions   Care Plan: ongoing   Pts outpatient clinic: [facility]     Tx Summary   Comments:                       PRE HD B; orange and apple juice given:2 250 ml of D10 given by primary nurse  [phone]: B  0910: Time out done, order parameters checked, Dialysis related medications and consent have been reviewed. 0915: Treatment started with slow flow, gradually increased to ordered BF. Monitored closely. Lines visible and secured at all time. Extracorporeal lines flushed with 100CC NS to monitor for clots. Patients vitally stable. 1245: Treatment completed, all blood returned. Each dialysis catheter limb disinfected per p&p,  post dialysis catheter dwell instilled per order, and caps applied. Dressing kept intact and dated. Treatment tolerated well. Comfort and care provided, needs attended, questions answered. Call bell within reach, bed to lowest position.

## 2023-03-09 ENCOUNTER — ANESTHESIA EVENT (OUTPATIENT)
Dept: SURGERY | Age: 43
End: 2023-03-09
Payer: MEDICARE

## 2023-03-09 LAB
GLUCOSE BLD STRIP.AUTO-MCNC: 203 MG/DL (ref 65–117)
GLUCOSE BLD STRIP.AUTO-MCNC: 216 MG/DL (ref 65–117)
GLUCOSE BLD STRIP.AUTO-MCNC: 243 MG/DL (ref 65–117)
GLUCOSE BLD STRIP.AUTO-MCNC: 262 MG/DL (ref 65–117)
SERVICE CMNT-IMP: ABNORMAL

## 2023-03-09 PROCEDURE — 74011000250 HC RX REV CODE- 250: Performed by: GENERAL ACUTE CARE HOSPITAL

## 2023-03-09 PROCEDURE — 74011250636 HC RX REV CODE- 250/636: Performed by: INTERNAL MEDICINE

## 2023-03-09 PROCEDURE — 74011250637 HC RX REV CODE- 250/637: Performed by: INTERNAL MEDICINE

## 2023-03-09 PROCEDURE — 77010033678 HC OXYGEN DAILY

## 2023-03-09 PROCEDURE — 82962 GLUCOSE BLOOD TEST: CPT

## 2023-03-09 PROCEDURE — 74011636637 HC RX REV CODE- 636/637

## 2023-03-09 PROCEDURE — 65270000046 HC RM TELEMETRY

## 2023-03-09 PROCEDURE — 74011250637 HC RX REV CODE- 250/637: Performed by: GENERAL ACUTE CARE HOSPITAL

## 2023-03-09 RX ORDER — INSULIN GLARGINE 100 [IU]/ML
10 INJECTION, SOLUTION SUBCUTANEOUS
Status: DISCONTINUED | OUTPATIENT
Start: 2023-03-09 | End: 2023-03-09

## 2023-03-09 RX ORDER — INSULIN GLARGINE 100 [IU]/ML
10 INJECTION, SOLUTION SUBCUTANEOUS
Status: DISCONTINUED | OUTPATIENT
Start: 2023-03-10 | End: 2023-03-09

## 2023-03-09 RX ORDER — INSULIN GLARGINE 100 [IU]/ML
10 INJECTION, SOLUTION SUBCUTANEOUS DAILY
Status: DISCONTINUED | OUTPATIENT
Start: 2023-03-10 | End: 2023-03-11

## 2023-03-09 RX ADMIN — Medication 2 UNITS: at 08:54

## 2023-03-09 RX ADMIN — TAMSULOSIN HYDROCHLORIDE 0.4 MG: 0.4 CAPSULE ORAL at 08:53

## 2023-03-09 RX ADMIN — ROSUVASTATIN CALCIUM 40 MG: 40 TABLET, FILM COATED ORAL at 22:38

## 2023-03-09 RX ADMIN — SEVELAMER CARBONATE 1600 MG: 800 TABLET, FILM COATED ORAL at 17:21

## 2023-03-09 RX ADMIN — PREGABALIN 75 MG: 50 CAPSULE ORAL at 22:37

## 2023-03-09 RX ADMIN — Medication 2 UNITS: at 21:02

## 2023-03-09 RX ADMIN — HYDRALAZINE HYDROCHLORIDE 100 MG: 50 TABLET, FILM COATED ORAL at 17:22

## 2023-03-09 RX ADMIN — SUCRALFATE 1 G: 1 TABLET ORAL at 17:22

## 2023-03-09 RX ADMIN — SUCRALFATE 1 G: 1 TABLET ORAL at 22:38

## 2023-03-09 RX ADMIN — CARVEDILOL 12.5 MG: 12.5 TABLET, FILM COATED ORAL at 08:53

## 2023-03-09 RX ADMIN — ASPIRIN 81 MG: 81 TABLET, CHEWABLE ORAL at 08:53

## 2023-03-09 RX ADMIN — HYDRALAZINE HYDROCHLORIDE 100 MG: 50 TABLET, FILM COATED ORAL at 22:38

## 2023-03-09 RX ADMIN — LOSARTAN POTASSIUM 50 MG: 50 TABLET, FILM COATED ORAL at 08:53

## 2023-03-09 RX ADMIN — Medication 2 UNITS: at 12:18

## 2023-03-09 RX ADMIN — PANTOPRAZOLE SODIUM 40 MG: 40 TABLET, DELAYED RELEASE ORAL at 08:53

## 2023-03-09 RX ADMIN — CARVEDILOL 12.5 MG: 12.5 TABLET, FILM COATED ORAL at 17:21

## 2023-03-09 RX ADMIN — SEVELAMER CARBONATE 1600 MG: 800 TABLET, FILM COATED ORAL at 08:53

## 2023-03-09 RX ADMIN — DULOXETINE 60 MG: 30 CAPSULE, DELAYED RELEASE ORAL at 08:53

## 2023-03-09 RX ADMIN — HEPARIN SODIUM 5000 UNITS: 5000 INJECTION INTRAVENOUS; SUBCUTANEOUS at 21:02

## 2023-03-09 RX ADMIN — Medication 2 UNITS: at 17:22

## 2023-03-09 RX ADMIN — Medication 5 UNITS: at 09:11

## 2023-03-09 RX ADMIN — HYDRALAZINE HYDROCHLORIDE 100 MG: 50 TABLET, FILM COATED ORAL at 08:53

## 2023-03-09 RX ADMIN — FUROSEMIDE 40 MG: 40 TABLET ORAL at 08:54

## 2023-03-09 RX ADMIN — SODIUM CHLORIDE, PRESERVATIVE FREE 10 ML: 5 INJECTION INTRAVENOUS at 21:03

## 2023-03-09 RX ADMIN — HEPARIN SODIUM 5000 UNITS: 5000 INJECTION INTRAVENOUS; SUBCUTANEOUS at 08:54

## 2023-03-09 RX ADMIN — SUCRALFATE 1 G: 1 TABLET ORAL at 08:53

## 2023-03-09 RX ADMIN — AMLODIPINE BESYLATE 10 MG: 5 TABLET ORAL at 08:54

## 2023-03-09 RX ADMIN — Medication 5 UNITS: at 21:02

## 2023-03-09 RX ADMIN — SEVELAMER CARBONATE 1600 MG: 800 TABLET, FILM COATED ORAL at 12:17

## 2023-03-09 RX ADMIN — FUROSEMIDE 40 MG: 40 TABLET ORAL at 17:22

## 2023-03-09 RX ADMIN — SUCRALFATE 1 G: 1 TABLET ORAL at 12:18

## 2023-03-09 RX ADMIN — FINASTERIDE 5 MG: 5 TABLET, FILM COATED ORAL at 08:53

## 2023-03-09 RX ADMIN — CLONIDINE HYDROCHLORIDE 0.1 MG: 0.1 TABLET ORAL at 04:26

## 2023-03-09 NOTE — PROGRESS NOTES
Received notification from bedside RN about patient with regards to: , needs Lispro coverage order    Intervention given:   - Lispro 4 units SQ x 1 dose

## 2023-03-09 NOTE — PROGRESS NOTES
Physician Progress Note      PATIENT:               Atul Bliss  CSN #:                  [id number]  :                       1980  ADMIT DATE:       3/4/2023 1:14 PM  [address] [place] DATE:  RESPONDING  PROVIDER #:        Ahemt Bhatti MD          QUERY TEXT:    Patient admitted with Acute on chronic hypoxic respiratory failure, S/s fluid overload in the setting of missing HD. Noted documentation of Suspected UTI, UA negative on Dr Donna Ugarte 3/5 pn. In order to support the diagnosis of UTI, please include additional clinical indicators in your documentation. Or please document if the diagnosis of UTI has been ruled out after further study. The medical record reflects the following:  Risk Factors: ESRD  Clinical Indicators: UA: Neg Nitrites/Leukocyte Esterase/Bacteria  Treatment: UA, per H&P: start ceftriaxone after UA is collected    Thank you,  Shekhar Shaw RN, CDI  Options provided:  -- UTI was ruled out  -- UTI present as evidenced by, Please document evidence. -- Other - I will add my own diagnosis  -- Disagree - Not applicable / Not valid  -- Disagree - Clinically unable to determine / Unknown  -- Refer to Clinical Documentation Reviewer    PROVIDER RESPONSE TEXT:    UTI was ruled out after study.     Query created by: Haley Olvera on 3/6/2023 8:11 AM      Electronically signed by:  Ahmet Bhatti MD 3/9/2023 8:02 AM

## 2023-03-09 NOTE — PROGRESS NOTES
Spiritual Care Assessment/Progress Note  [facility]      NAME: Demetrio Ray      MRN: [id number]  AGE: 38 y.o.  SEX: male  Evangelical Affiliation: Rastafarian   Language: English     3/9/2023     Total Time (in minutes): 27     Spiritual Assessment begun in [facility] PROGRESSIVE CARE through conversation with:         [x]Patient        [] Family    [] Friend(s)        Reason for Consult: Initial/Spiritual assessment, patient floor     Spiritual beliefs: (Please include comment if needed)     [x] Identifies with a keerthi tradition:         [x] Supported by a keerthi community:            [] Claims no spiritual orientation:           [] Seeking spiritual identity:                [] Adheres to an individual form of spirituality:           [] Not able to assess:                           Identified resources for coping:      [x] Prayer                               [] Music                  [] Guided Imagery     [x] Family/friends                 [] Pet visits     [] Devotional reading                         [] Unknown     [] Other:                                                Interventions offered during this visit: (See comments for more details)    Patient Interventions: Initial/Spiritual assessment, patient floor, Coping skills reviewed/reinforced, Life review/legacy, Normalization of emotional/spiritual concerns, Prayer (assurance of), Evangelical beliefs/image of God discussed, Integration of medical assessment with existing values and beliefs, Affirmation of keerthi, Catharsis/review of pertinent events in supportive environment, Iconic (affirming the presence of God/Higher Power)           Plan of Care:     [] Support spiritual and/or cultural needs    [] Support AMD and/or advance care planning process      [] Support grieving process   [] Coordinate Rites and/or Rituals    [] Coordination with community clergy   [] No spiritual needs identified at this time   [] Detailed Plan of Care below (See Comments)  [] Make referral to Music Therapy  [] Make referral to Pet Therapy     [] Make referral to Addiction services  [] Make referral to [facility]  [] Make referral to Spiritual Care Partner  [] No future visits requested        [x] Contact Spiritual Care for further referrals     Comments:  Initial spiritual assessment in 2327. Patient was in bed and appeared comfortable. He stated he was doing well today. He has a good support system and keerthi is very important for coping. He lives with his mom who is very special to him, and she comes to see him regularly. He always believed in God but he has recently become more devoted, he got baptized last November. His  was also here to see him. Patient's thoughts and feelings affirmed, continous self care encouraged. He thanked  for the visit.         Visited by: Dilan crocker: [phone])

## 2023-03-09 NOTE — PROGRESS NOTES
Problem: Risk for Spread of Infection  Goal: Prevent transmission of infectious organism to others  Description: Prevent the transmission of infectious organisms to other patients, staff members, and visitors. Outcome: Progressing Towards Goal     Problem: Patient Education:  Go to Education Activity  Goal: Patient/Family Education  Outcome: Progressing Towards Goal     Problem: Falls - Risk of  Goal: *Absence of Falls  Description: Document Bre Christie Fall Risk and appropriate interventions in the flowsheet.   Outcome: Progressing Towards Goal  Note: Fall Risk Interventions:            Medication Interventions: Bed/chair exit alarm

## 2023-03-09 NOTE — PROGRESS NOTES
959Pt BG was 428 then it was rechecked and got 401. Paged MD for orders. MD ordered 4units of lispo. End of Shift Note    Bedside shift change report given to [facility] by Gabriela Sharp . Report included the following information SBAR, Accordion, and Recent Results    Shift worked:  7p-7a     Shift summary and any significant changes:    Pt was hyperglycemic 428     Concerns for physician to address: no     Zone phone for oncoming shift:  [phone]       Activity:  Activity Level: Bed Rest  Number times ambulated in hallways past shift: 1  Number of times OOB to chair past shift: 2    Cardiac:   Cardiac Monitoring: Yes      Cardiac Rhythm: Sinus Rhythm    Access:  Current line(s): PIV and HD access     Genitourinary:   Urinary status: voiding and oliguric    Respiratory:   O2 Device: Nasal cannula  Chronic home O2 use?: YES  Incentive spirometer at bedside: NO       GI:  Last Bowel Movement Date: 03/03/23  Current diet:  ADULT DIET Regular; 3 carb choices (45 gm/meal); Low Potassium (Less than 3000 mg/day); Low Phosphorus (Less than 1000 mg)  DIET ONE TIME MESSAGE  DIET ONE TIME MESSAGE  Passing flatus: YES  Tolerating current diet: YES       Pain Management:   Patient states pain is manageable on current regimen: YES    Skin:  Corey Score: 16  Interventions: speciality bed    Patient Safety:  Fall Score:  Total Score: 2  Interventions: bed/chair alarm and assistive device (walker, cane, etc)       Length of Stay:  Expected LOS: 3d 9h  Actual LOS: 5      Rosalie Juarez RN

## 2023-03-09 NOTE — DIABETES MGMT
[facility]    CLINICAL NURSE SPECIALIST CONSULT     Initial Presentation   Mykel Garner is a 38 y.o. male admitted 3/4/2023 after experiencing right flank pain and hyperglycemia. The patient is M,W, F  HD patient and he reportedly missed his Friday ( 3/3/23)dialysis. LAB:Glucose 595. Creatine 6.80. GFR 10. A1c 12.3%  CXR:Study Result    Narrative & Impression   INDICATION: vol overload       EXAM:  AP CHEST RADIOGRAPH     COMPARISON: 12/3/2022     FINDINGS:     AP portable view of the chest demonstrates interval removal of right IJ  permacath. Heart size is likely normal, though partly obscured by bibasilar  airspace disease and moderate left/small right pleural effusions. Mild pulmonary  edema. No pneumothorax. The osseous structures are unremarkable. IMPRESSION  Pulmonary edema with moderate left and small right pleural effusions, and  associated bibasilar airspace disease. CT/MRI:    HX:   Past Medical History:   Diagnosis Date    Bipolar 1 disorder, depressed ([address]     Bipolar disorder ([address]     Chronic kidney disease, stage 3a ([address]     Depression     Diabetes ([address]     DKA, type 1 ([address] 1/27/2013    diagnosed age 23    DKA, type 1 ([address]     H/O noncompliance with medical treatment, presenting hazards to health     MRSA (methicillin resistant staph aureus) culture positive     MRSA (methicillin resistant Staphylococcus aureus)     Face    Noncompliance with medication regimen     Second hand smoke exposure     Seizure ([address]     Seizures ([address] 2006 or 2007    one episode during halfway        INITIAL DX:   Acute on chronic respiratory failure with hypoxia ([address] [J96.21]     Current Treatment     TX: BP management. ASA. Coreg. Abx. Catapres. Cymbalta. Lasix. Apresolinew. Insulin lostartan. Protonix. Lyrica. Crestor. Crafate.      Consulted by Provider for advanced diabetes nursing assessment and care for:   [] Transitioning off [facility]   [x] Inpatient management strategy  [x] Home management assessment  [] Survival skill education    Hospital Course   Clinical progress has been uncomplicated . Diabetes History   The patient reports a 20 year history of Type 1 diabetes. He has a positive family hx of diabetes (mom & niece). He states today that he sees Dr. Bear Gallagher (endocrinologist) for his diabetes. He no longer uses an insulin pump and is back on insulin injections. He uses 10 units Lantus nightly and sliding scale Humalog with meals. Diabetes-related Medical History  Acute complications  DKA  Neurological complications  Gastroparesis and Peripheral neuropathy  Microvascular disease  Nephropathy  Macrovascular disease  Myocardial infarction    Diabetes Medication History  Key Antihyperglycemic Medications               insulin glargine (LANTUS,BASAGLAR) 100 unit/mL (3 mL) inpn [place] 10 units subcut daily    insulin lispro (HUMALOG) 100 unit/mL kwikpen Administer 10 units before each meal             Diabetes self-management practices:   Eating pattern Deferred     Physical activity pattern Deferred     Monitoring pattern   [x] Testing BGs sufficiently to inform self-management adjustments    Taking medications pattern  [x] Inconsistent administration  [x] Affordable    Overall evaluation:    [x] Not achieving A1c target with drug therapy & self-care practices    Subjective   Ana Ortiz are trying to get my thing ( pointing to fistula site) working     Objective   Physical exam  General Normal weight male in no acute distress.  Conversant and cooperative  Neuro  Alert, oriented   Vital Signs Visit Vitals  BP (!) 129/104   Pulse 77   Temp 97.7 °F (36.5 °C)   Resp 16   Wt 72.9 kg (160 lb 11.5 oz)   SpO2 91%   BMI 23.73 kg/m²         Laboratory  Recent Labs     03/08/23  0920 03/07/23  0019 03/07/23  0004   *  --  210*   AGAP 5  --  9   WBC 5.6 6.2  --    CREA 4.28*  --  7.11*         Factors impacting BG management  Factor Dose Comments   Nutrition:  Standard meals 60 grams/meal      Pain PRN acetaminophen    Infection Rocephin    Other:   Kidney function  Liver function     ESRD  AST 9 ( 3/4/2023)      Blood glucose pattern      Significant diabetes-related events over the past 24-72 hours  Transitioned off glucostabilizer on 3/4/23 with 10 units Lantus    Assessment and Plan   Nursing Diagnosis Risk for unstable blood glucose pattern   Nursing Intervention Domain 4373 Decision-making Support   Nursing Interventions Examined current inpatient diabetes/blood glucose control   Explored factors facilitating and impeding inpatient management  Explored corrective strategies with patient and responsible inpatient provider   Informed patient of rational for insulin strategy while hospitalized       Evaluation   This 38year old male did not achieve BG control prior to admission as evidenced by an A1c of 12.3%. The patient is using 10 units Lantus nightly at home, as well sliding scale Humalog. The patient reports that he takes his basal dose consistently but sometimes skips Humalog. The patient has a hx of many past admissions for DKA. The patient was using the insulin pump but has been taken off and put back on insulin injections by his endocrinologist. He is now ordered is home dose of 10 units Lantus daily and I agree with this strategy. The patient had a hypoglycemic episode in the early morning. The patient received 7 units correction insulin last evening with his basal dose. I suspect the additional corrective insulin may be the culprit for the hypoglycemia. I have changed the corrective insulin from resistant to high sensitivity. I recommend keeping basal dose at 10 units nightly. BG's elevated this morning. The patient received a decreased basal dose of 5 units Lantus last evening. It was not enough. I ordered an additional 5 units NPH dose to be given this morning to help stabilize BG's.  An additional 5 units NPH dose ordered for tonight and 10 units Lantus daily to be resumed in the morning.        Orders/Plan   Resume  10 units Lantus daily tomorrow morning  Use high sensitivity correction        ANEESH Estrella  Diabetes Clinical Nurse Specialist  [facility]  Access via [address] [place] [address]

## 2023-03-09 NOTE — PROGRESS NOTES
Vascular Surgery Progress Note  Rubens Bravo, AGACNP-BC    Admit Date: 3/4/2023   LOS: 5 days      Daily Progress Note: 3/9/2023    Subjective: Jorge Laboy is a 38 y.o. male with PMHx significant for ESRD (on HD MWF), HTN and DM Type I who we are following for clotted dialysis access. He presented to [facility] from [facility] on 3/4/23 with complaints of right flank pain, dypsnea with pleural effusions, elevatd blood glucose, and missed dialysis and was admitted for ARF, pulmonary edema and hyperglycemia. This admission, his graft stopped functioning. He is s/p LUE AVG with Dr. Loralie Mohs on 22. He had R IJ central line placed on 3/6/23 with IR and is tolerating HD. Complications of his stay include code called on 3/7 for over-narcotized event, ROSC obtained  with compressions and Narcan. No acute events overnight. Blood glucose levels continue to fluctuate. Objective:     Vital signs  Temp (24hrs), Av.9 °F (36.6 °C), Min:97.5 °F (36.4 °C), Max:98.3 °F (36.8 °C)    0701 -  1900  In: 300 [P.O.:300]  Out: 50 [Urine:50]   190 -  0700  In: 4430 [P.O.:697; I.V.:500]  Out: 4250 [Urine:250]    Visit Vitals  BP (!) 139/58 (BP 1 Location: Left lower arm, BP Patient Position: Lying)   Pulse 72   Temp 97.7 °F (36.5 °C)   Resp 16   Wt 72.9 kg (160 lb 11.5 oz)   SpO2 95%   BMI 23.73 kg/m²    O2 Flow Rate (L/min): 3 l/min O2 Device: Nasal cannula     Pain control  Pain Assessment  Pain Scale 1: Numeric (0 - 10)  Pain Intensity 1: 0  Pain Onset 1: acute  Pain Location 1: Flank  Pain Orientation 1: Right  Pain Description 1: Aching, Constant  Pain Intervention(s) 1: Repositioned    PT/OT  Gait                 Physical Exam  Vitals and nursing note reviewed. Constitutional:       General: He is not in acute distress. HENT:      Head: Normocephalic. Eyes:      General: No scleral icterus. Cardiovascular:      Rate and Rhythm: Normal rate.       Arteriovenous access: Left arteriovenous access is present. Comments: LUE AVG without palpable thrill and faint bruit  Musculoskeletal:      Cervical back: Normal range of motion. Neurological:      Mental Status: He is alert.           24 hour results:    Recent Results (from the past 24 hour(s))   GLUCOSE, POC    Collection Time: 03/08/23  4:33 PM   Result Value Ref Range    Glucose (POC) 265 (H) 65 - 117 mg/dL    Performed by [facility] PCT    GLUCOSE, POC    Collection Time: 03/08/23  9:53 PM   Result Value Ref Range    Glucose (POC) 428 (H) 65 - 117 mg/dL    Performed by Chiquita Velazquez (GO RN)    GLUCOSE, POC    Collection Time: 03/08/23  9:54 PM   Result Value Ref Range    Glucose (POC) 401 (H) 65 - 117 mg/dL    Performed by Chiquita Velazquez (GO RN)    GLUCOSE, POC    Collection Time: 03/09/23  7:35 AM   Result Value Ref Range    Glucose (POC) 243 (H) 65 - 117 mg/dL    Performed by [facility] PCT    GLUCOSE, POC    Collection Time: 03/09/23 11:34 AM   Result Value Ref Range    Glucose (POC) 203 (H) 65 - 117 mg/dL    Performed by [facility] PCT           Assessment/Plan:     Active Problems:    Acute on chronic respiratory failure with hypoxia ([address] (3/4/2023)    ESRD on HD  Electrolyte imbalance  Anemia of CKD  Missed HD  Fluid overload  - HD management per nephrology  Dialysis access complication  S/p LUE AVG 12/2022  - clotted with no thrill and faint bruit  - has R IJ placed in IR 3/6/23  - plan for declot Friday 3/10/23  - NPO at midnight  - verify consent      Acute on chronic hypoxic respiratory failure  Pulmonary edema and B/L pleural effusion, L>right    code blue 3/7 for over-narcotization, ROSC with compressions and narcan       DM1, uncontrolled  - management per primary team  - Diabetes mgmt team following    Right flank/abdominal pain  - management per primary team  - UA negative, Renal U/S negative     HTN      Activity:  DVT ppx: heparin   Dispo:         Manny France NP

## 2023-03-09 NOTE — PROGRESS NOTES
Nephrology Progress Note  [place]       [facility] / [address], Adelane Yenny  [place], [address]  Phone - [phone]  Fax - [phone]                 Patient: Carmen Hill                   YOB: 1980        Date- 3/9/2023                      Admit Date: 3/4/2023  CC: Follow up for esrd      IMPRESSION & PLAN:   ESRD - on hd mwf at [facility]  Hyponatremia  Fluid overload  Anemia of ckd  Pulmonary edema  hypertension  Uncontrolled DM  H/o Urinary retention requiring hansen cath in pasts  Type 1 diabetes  Non compliant to fluid restrictions. PLAN-  NO HD TODAY  Declot for avf on Friday  Continue norvasc, coreg, clonidine, hydralazine, losartan. Continue hd mwf  Follow bmp     Subjective: Interval History:   -3-9-23-s/p hd yesterday-- sob improving  3/8/23--- he has hypoglycemia this am-- now on d 10% gtt - s/p EXTRA hd yesterday - 3 kg fluid removed  3-7-23--he has clotted avg- he had new carlos cath placed yesterday-- he had hd last night  3-6-23--  bp stable--Sob improved- c/o right sided abdo pain-    Objective:   Vitals:    03/09/23 0400 03/09/23 0426 03/09/23 0800 03/09/23 0858   BP: (!) 170/78 (!) 170/78 (!) 129/104    Pulse: 76 85 73 77   Resp:  14 16    Temp:  97.5 °F (36.4 °C) 97.7 °F (36.5 °C)    SpO2: 91% 95% 95% 91%   Weight:          03/08 0701 - 03/09 0700  In: 1197 [P.O.:697; I.V.:500]  Out: 4250 [Urine:250]    Last 3 Recorded Weights in this Encounter    03/05/23 0637 03/07/23 0525 03/08/23 0550   Weight: 76.2 kg (167 lb 15.9 oz) 73.8 kg (162 lb 11.2 oz) 72.9 kg (160 lb 11.5 oz)        Physical exam:    GEN:  NAD  NECK:  Supple, no thyromegaly  RESP: Clear  b/l, no  wheezing,   CVS: RRR,S1,S2   NEURO: non focal, normal speech  EXT: Edema +nt       Right ij carlos +  Left arm avg - no bruit or thrill    Chart reviewed. Pertinent Notes reviewed.      Data Review :  Recent Labs 03/08/23  0920 03/07/23  0004   * 131*   K 4.2 4.0   CL 97 95*   CO2 27 27   BUN 28* 66*   CREA 4.28* 7.11*   * 210*   CA 7.8* 7.8*   PHOS 3.9  --        Recent Labs     03/08/23  0920 03/07/23  0019   WBC 5.6 6.2   HGB 12.0* 10.8*   HCT 40.6 36.6    178       No results for input(s): FE, TIBC, PSAT, FERR in the last 72 hours.    Lab Results   Component Value Date/Time    Hemoglobin A1c 12.3 (H) 03/04/2023 03:37 PM    Hemoglobin A1c 10.1 (H) 11/29/2022 08:48 PM    Hemoglobin A1c 7.2 (H) 09/26/2022 09:34 AM        Lab Results   Component Value Date/Time    Microalbumin/Creat ratio (mg/g creat) 11,439 (H) 04/01/2021 10:00 AM    Microalbumin,urine random 151.00 04/01/2021 10:00 AM     US Results (most recent):  Medication list  reviewed  Current Facility-Administered Medications   Medication Dose Route Frequency    insulin glargine (LANTUS) injection 10 Units  10 Units SubCUTAneous QHS    oxyCODONE IR (ROXICODONE) tablet 2.5 mg  2.5 mg Oral Q6H PRN    heparin (porcine) 1,000 unit/mL injection 1,400 Units  1,400 Units InterCATHeter DIALYSIS PRN    And    heparin (porcine) 1,000 unit/mL injection 1,100 Units  1,100 Units InterCATHeter DIALYSIS PRN    albumin human 25% (BUMINATE) solution 12.5 g  12.5 g IntraVENous DIALYSIS PRN    heparin (porcine) 1,000 unit/mL injection 2,500 Units  2,500 Units Hemodialysis DIALYSIS PRN    sodium chloride (NS) flush 5-40 mL  5-40 mL IntraVENous Q8H    sodium chloride (NS) flush 5-40 mL  5-40 mL IntraVENous PRN    insulin lispro (HUMALOG) injection   SubCUTAneous AC&HS    glucose chewable tablet 16 g  4 Tablet Oral PRN    glucagon (GLUCAGEN) injection 1 mg  1 mg IntraMUSCular PRN    dextrose 10% infusion 0-250 mL  0-250 mL IntraVENous PRN    cloNIDine HCL (CATAPRES) tablet 0.1 mg  0.1 mg Oral Q6H PRN    acetaminophen (TYLENOL) tablet 650 mg  650 mg Oral Q6H PRN    Or    acetaminophen (TYLENOL) suppository 650 mg  650 mg Rectal Q6H PRN    polyethylene glycol (MIRALAX) packet 17 g  17 g Oral DAILY PRN    ondansetron (ZOFRAN ODT) tablet 4 mg  4 mg Oral Q8H PRN    Or    ondansetron (ZOFRAN) injection 4 mg  4 mg IntraVENous Q6H PRN    amLODIPine (NORVASC) tablet 10 mg  10 mg Oral DAILY    aspirin chewable tablet 81 mg  81 mg Oral DAILY    carvediloL (COREG) tablet 12.5 mg  12.5 mg Oral BID WITH MEALS    cloNIDine (CATAPRES) 0.1 mg/24 hr patch 1 Patch  1 Patch TransDERmal Q7D    DULoxetine (CYMBALTA) capsule 60 mg  60 mg Oral DAILY    finasteride (PROSCAR) tablet 5 mg  5 mg Oral DAILY    hydrALAZINE (APRESOLINE) tablet 100 mg  100 mg Oral TID    losartan (COZAAR) tablet 50 mg  50 mg Oral DAILY    pantoprazole (PROTONIX) tablet 40 mg  40 mg Oral DAILY    pregabalin (LYRICA) capsule 75 mg  75 mg Oral QHS    rosuvastatin (CRESTOR) tablet 40 mg  40 mg Oral QHS    sevelamer carbonate (RENVELA) tab 1,600 mg  1,600 mg Oral TID WITH MEALS    sucralfate (CARAFATE) tablet 1 g  1 g Oral AC&HS    tamsulosin (FLOMAX) capsule 0.4 mg  0.4 mg Oral DAILY    heparin (porcine) injection 5,000 Units  5,000 Units SubCUTAneous Q12H    furosemide (LASIX) tablet 40 mg  40 mg Oral BID        Calvin Westbrook MD  3/9/2023

## 2023-03-09 NOTE — PROGRESS NOTES
5930: Bedside report received from night nurse. Pt asleep in bed, no s/s of distress. 8017: Dr. Jaleesa Pandya rounding to bedside. Tentative plan for pt to have KO fistula declotted tomorrow. 1415: Consent for thrombectomy signed and placed on pt chart. 1930: Bedside report given to Sterling Yu RN.

## 2023-03-09 NOTE — PROGRESS NOTES
959Pt BG was 428 then it was rechecked and got 401. Paged MD for orders.  MD ordered 4units of shaista

## 2023-03-09 NOTE — PROGRESS NOTES
Hospitalist Progress Note    NAME: Saturnino Gil   :  1980   MRN:  [id number]       Assessment / Plan: Altered mental status secondary to severe hypoglycemia  Uncontrolled diabetes mellitus with labile blood sugars  Patient was lethargic on  as blood sugar was in 26, improved with D10 to 250 mL x 2  Was more alert oriented upon my evaluation  Initially was treated for DKA, now having hypoglycemias  Lantus reduced from 10 units to 5 units  Continue sliding scale insulin  : Sugar in the 400s, Lantus increased to 10 unit, added mealtime insulin 10 unit before each meal , continue sliding scale  Educator consulted    S/p code blue on 3/7  Became responsive after round of CPR  Most likely due to too much pain medication  Pain medication was adjusted  As per mother, pt had few similar episodes at home    Acute on chronic hypoxic respiratory failure, chronically on 2 L/M  S/s fluid overload in the setting of missing HD  Pulmonary edema and B/L pleural effusion, L>right  Nephrology consulted. HD as per nephrology  Continue oxygen and wean as tolerated      Right back Pain:  Likely s/s pleural effusion vs right pyelonephritis. UA neg  renal/bladder US neg  Lower percocet dose to 2.5 mg, discontinue Toradol as it is contraindicated in hemodialysis patient  CT abd pelvis reviewed   IMPRESSION  1. Bilateral pleural effusions and lower lobe atelectasis.   2.  No evidence of nephrolithiasis or flank hematoma/injury  Hypertension:  Resume home medications- coreg, amlodipine, hydralazine, losartan      ESRD on HD  Nephrology consulted  HD as per nephrology  AVF clotted, Vascular consulted  New carlos cath inserted on 3/6     Suspected UTI  Started on ceftriaxone, follow-up urine cultures if able to obtain     Code Status: full code  Surrogate Decision Maker:mother   DVT Prophylaxis: sc heparin  18.5 - 24.9 Normal weight / Body mass index is 23.73 kg/m². Recommended Disposition: Home w/Family  Anticipated Discharge Date: 03/10  SHAE Barriers: Stability of blood sugars       Subjective:   Episode of altered mental status due to severe hypoglycemia  No acute issues, patient feeling better but blood sugar trending up    Review of Systems:  Symptom Y/N Comments  Symptom Y/N Comments   Fever/Chills n   Chest Pain n    Poor Appetite    Edema     Cough    Abdominal Pain n    Sputum    Joint Pain     SOB/JOHNSTON n   Pruritis/Rash     Nausea/vomit n   Tolerating PT/OT     Diarrhea    Tolerating Diet y    Constipation    Other       PO intake: No data found. Wt Readings from Last 10 Encounters:   03/08/23 72.9 kg (160 lb 11.5 oz)   01/10/23 85.3 kg (188 lb)   12/29/22 78 kg (171 lb 15.3 oz)   12/27/22 78 kg (171 lb 15.3 oz)   12/21/22 78 kg (171 lb 15.3 oz)   12/06/22 80.6 kg (177 lb 11.1 oz)   11/02/22 71.8 kg (158 lb 6.4 oz)   10/25/22 69.2 kg (152 lb 8.9 oz)   09/28/22 75.8 kg (167 lb 3.2 oz)   09/13/22 72.1 kg (159 lb)       Objective:     VITALS:   Last 24hrs VS reviewed since prior progress note.  Most recent are:  Patient Vitals for the past 24 hrs:   Temp Pulse Resp BP SpO2   03/09/23 1609 97.7 °F (36.5 °C) 75 18 135/86 92 %   03/09/23 1044 97.7 °F (36.5 °C) 72 -- (!) 139/58 95 %   03/09/23 0858 -- 77 -- -- 91 %   03/09/23 0800 97.7 °F (36.5 °C) 73 16 (!) 129/104 95 %   03/09/23 0426 97.5 °F (36.4 °C) 85 14 (!) 170/78 95 %   03/09/23 0400 -- 76 -- (!) 170/78 91 %   03/09/23 0028 98.2 °F (36.8 °C) 84 16 (!) 157/84 93 %   03/08/23 2022 98.3 °F (36.8 °C) 85 14 (!) 169/75 94 %   03/08/23 2000 -- -- -- -- 95 %         Intake/Output Summary (Last 24 hours) at 3/9/2023 1614  Last data filed at 3/9/2023 0858  Gross per 24 hour   Intake 637 ml   Output 150 ml   Net 487 ml          I had a face to face encounter, and independently examined this patient as outlined below:    PHYSICAL EXAM:  General:    No distress HEENT: Atraumatic, anicteric sclerae, pink conjunctivae, MMM  Neck:  Supple, symmetrical  Lungs:   CTA. No Wheezing/Rhonchi. No rales. No tenderness. No Accessory muscle use. CVS:   Regular rhythm. No murmur. No JVD. L UE AVF w + thrill   GI/:   Soft. NT. ND. BS normal. Mild R CVAT  Extremities: No edema. No cyanosis. No clubbing. Skin:     Not pale. Not Jaundiced. No rashes   Psych:  Good insight. Not depressed. Not anxious or agitated. Neurologic: Alert and oriented X 4. EOMs intact. No facial asymmetry. No slurred speech. Symmetrical strength, Sensation grossly intact. Labs     I reviewed today's most current labs and imaging studies. Pertinent labs include:  Recent Labs     03/08/23  0920 03/07/23  0019   WBC 5.6 6.2   HGB 12.0* 10.8*   HCT 40.6 36.6    178       Recent Labs     03/08/23  0920 03/07/23  0004   * 131*   K 4.2 4.0   CL 97 95*   CO2 27 27   * 210*   BUN 28* 66*   CREA 4.28* 7.11*   CA 7.8* 7.8*   PHOS 3.9  --    ALB 2.4*  --        CT ABD PELV W CONT    Result Date: 3/7/2023  1. Bilateral pleural effusions and lower lobe atelectasis. 2.  No evidence of nephrolithiasis or flank hematoma/injury. US RETROPERITONEUM LTD    Result Date: 3/4/2023  Unremarkable Renal Sonogram.     XR CHEST PORT    Result Date: 3/4/2023  Pulmonary edema with moderate left and small right pleural effusions, and associated bibasilar airspace disease. IR INSERT NON TUNL CVC OVER 5 YRS    Result Date: 3/6/2023  1. Successful placement of right internal jugular temporary dialysis catheter. Catheter ready for immediate use. No results found. 10/16/22    ECHO ADULT COMPLETE 10/27/2022 10/27/2022    Interpretation Summary    Left Ventricle: Normal left ventricular systolic function with a visually estimated EF of 45 - 50%. Left ventricle size is normal. Normal wall thickness. Normal wall motion. Aortic Valve: Tricuspid valve.     Signed by: Jaja Hatch MD on 10/27/2022  8:23 AM     All Micro Results       None              Current Medications:     Current Facility-Administered Medications:     insulin NPH (NOVOLIN N, HUMULIN N) injection 5 Units, 5 Units, SubCUTAneous, ONCE, Lynne Bridges CNS    [START ON 3/10/2023] insulin glargine (LANTUS) injection 10 Units, 10 Units, SubCUTAneous, DAILY, Lynne Bridges CNS    oxyCODONE IR (ROXICODONE) tablet 2.5 mg, 2.5 mg, Oral, Q6H PRN, Mei Morton MD    heparin (porcine) 1,000 unit/mL injection 1,400 Units, 1,400 Units, InterCATHeter, DIALYSIS PRN, 1,400 Units at 03/08/23 1252 **AND** heparin (porcine) 1,000 unit/mL injection 1,100 Units, 1,100 Units, InterCATHeter, DIALYSIS PRN, Freda Sparks MD, 1,100 Units at 03/08/23 1253    albumin human 25% (BUMINATE) solution 12.5 g, 12.5 g, IntraVENous, DIALYSIS PRN, Freda Sparks MD    heparin (porcine) 1,000 unit/mL injection 2,500 Units, 2,500 Units, Hemodialysis, DIALYSIS PRN, Freda Sparks MD, 2,500 Units at 03/07/23 0313    sodium chloride (NS) flush 5-40 mL, 5-40 mL, IntraVENous, Q8H, Felix Morton MD, 10 mL at 03/08/23 2158    sodium chloride (NS) flush 5-40 mL, 5-40 mL, IntraVENous, PRN, Felix Morton MD    insulin lispro (HUMALOG) injection, , SubCUTAneous, AC&HS, Roxan Fleischer, [facility], 2 Units at 03/09/23 1218    glucose chewable tablet 16 g, 4 Tablet, Oral, PRN, Mei Morton MD    glucagon (GLUCAGEN) injection 1 mg, 1 mg, IntraMUSCular, PRN, Felix Morton MD    dextrose 10% infusion 0-250 mL, 0-250 mL, IntraVENous, PRN, Felix Morton MD, 250 mL at 03/08/23 0744    cloNIDine HCL (CATAPRES) tablet 0.1 mg, 0.1 mg, Oral, Q6H PRN, Felix Morton MD, 0.1 mg at 03/09/23 0426    acetaminophen (TYLENOL) tablet 650 mg, 650 mg, Oral, Q6H PRN, 650 mg at 03/07/23 2129 **OR** acetaminophen (TYLENOL) suppository 650 mg, 650 mg, Rectal, Q6H PRN, Keila Herr MD    polyethylene glycol (MIRALAX) packet 17 g, 17 g, Oral, DAILY PRN, Addison, MD Keila    ondansetron (ZOFRAN ODT) tablet 4 mg, 4 mg, Oral, Q8H PRN **OR** ondansetron (ZOFRAN) injection 4 mg, 4 mg, IntraVENous, Q6H PRN, Keila Espinoza MD    amLODIPine (NORVASC) tablet 10 mg, 10 mg, Oral, DAILY, AddisonKeila church MD, 10 mg at 03/09/23 9549    aspirin chewable tablet 81 mg, 81 mg, Oral, DAILY, AddisonKeila caruso MD, 81 mg at 03/09/23 0853    carvediloL (COREG) tablet 12.5 mg, 12.5 mg, Oral, BID WITH MEALS, Keila Jaime MD, 12.5 mg at 03/09/23 4981    cloNIDine (CATAPRES) 0.1 mg/24 hr patch 1 Patch, 1 Patch, TransDERmal, Q7D, Keila Jaime MD    DULoxetine (CYMBALTA) capsule 60 mg, 60 mg, Oral, DAILY, Unknown MD Bruce, 60 mg at 03/09/23 0853    finasteride (PROSCAR) tablet 5 mg, 5 mg, Oral, DAILY, Unknown MD Bruce, 5 mg at 03/09/23 1222    hydrALAZINE (APRESOLINE) tablet 100 mg, 100 mg, Oral, TID, Unknown MD Bruce, 100 mg at 03/09/23 0853    losartan (COZAAR) tablet 50 mg, 50 mg, Oral, DAILY, Unknown MD Bruce, 50 mg at 03/09/23 0853    pantoprazole (PROTONIX) tablet 40 mg, 40 mg, Oral, DAILY, Unknown MD Bruce, 40 mg at 03/09/23 0853    pregabalin (LYRICA) capsule 75 mg, 75 mg, Oral, QHS, Ramesh Barrera MD, 75 mg at 03/08/23 2157    rosuvastatin (CRESTOR) tablet 40 mg, 40 mg, Oral, QHS, Ramesh Barrera MD, 40 mg at 03/08/23 2157    sevelamer carbonate (RENVELA) tab 1,600 mg, 1,600 mg, Oral, TID WITH MEALS, Keila Jaime MD, 1,600 mg at 03/09/23 1217    sucralfate (CARAFATE) tablet 1 g, 1 g, Oral, AC&HS, Unknown MD Bruce, 1 g at 03/09/23 1218    tamsulosin (FLOMAX) capsule 0.4 mg, 0.4 mg, Oral, DAILY, Addison, MD Keila, 0.4 mg at 03/09/23 0853    heparin (porcine) injection 5,000 Units, 5,000 Units, SubCUTAneous, Q12H, Unknown MD Bruce, 5,000 Units at 03/09/23 0854    furosemide (LASIX) tablet 40 mg, 40 mg, Oral, BID, Madyson Biggs MD, 40 mg at 03/09/23 5663     Procedures: see electronic medical records for all procedures/Xrays and details which were not copied into this note but were reviewed prior to creation of Plan. Reviewed most current lab test results and cultures  YES  Reviewed most current radiology test results   YES  Review and summation of old records today    NO  Reviewed patient's current orders and MAR    YES  PMH/SH reviewed - no change compared to H&P  ________________________________________________________________________  Care Plan discussed with:    Comments   Patient x    Family  x    RN x    Care Manager x    Consultant                       x Multidiciplinary team rounds were held today with , nursing, pharmacist and clinical coordinator. Patient's plan of care was discussed; medications were reviewed and discharge planning was addressed.      ________________________________________________________________________  Total NON critical care TIME:   25Minutes    Total CRITICAL CARE TIME Spent:Minutes non procedure based      Comments   >50% of visit spent in counseling and coordination of care x     This includes time during multidisciplinary rounds if indicated above   ________________________________________________________________________  Jessica Trammell MD

## 2023-03-10 ENCOUNTER — APPOINTMENT (OUTPATIENT)
Dept: GENERAL RADIOLOGY | Age: 43
DRG: 628 | End: 2023-03-10
Attending: SURGERY
Payer: MEDICARE

## 2023-03-10 ENCOUNTER — ANESTHESIA (OUTPATIENT)
Dept: SURGERY | Age: 43
End: 2023-03-10
Payer: MEDICARE

## 2023-03-10 LAB
ANION GAP SERPL CALC-SCNC: 9 MMOL/L (ref 5–15)
BUN SERPL-MCNC: 47 MG/DL (ref 6–20)
BUN/CREAT SERPL: 8 (ref 12–20)
CALCIUM SERPL-MCNC: 7.9 MG/DL (ref 8.5–10.1)
CHLORIDE SERPL-SCNC: 101 MMOL/L (ref 97–108)
CO2 SERPL-SCNC: 22 MMOL/L (ref 21–32)
CREAT SERPL-MCNC: 5.71 MG/DL (ref 0.7–1.3)
ERYTHROCYTE [DISTWIDTH] IN BLOOD BY AUTOMATED COUNT: 15.4 % (ref 11.5–14.5)
GLUCOSE BLD STRIP.AUTO-MCNC: 167 MG/DL (ref 65–117)
GLUCOSE BLD STRIP.AUTO-MCNC: 173 MG/DL (ref 65–117)
GLUCOSE BLD STRIP.AUTO-MCNC: 174 MG/DL (ref 65–117)
GLUCOSE BLD STRIP.AUTO-MCNC: 187 MG/DL (ref 65–117)
GLUCOSE BLD STRIP.AUTO-MCNC: 251 MG/DL (ref 65–117)
GLUCOSE SERPL-MCNC: 186 MG/DL (ref 65–100)
HCT VFR BLD AUTO: 36.4 % (ref 36.6–50.3)
HGB BLD-MCNC: 11.2 G/DL (ref 12.1–17)
MCH RBC QN AUTO: 24.8 PG (ref 26–34)
MCHC RBC AUTO-ENTMCNC: 30.8 G/DL (ref 30–36.5)
MCV RBC AUTO: 80.5 FL (ref 80–99)
NRBC # BLD: 0 K/UL (ref 0–0.01)
NRBC BLD-RTO: 0 PER 100 WBC
PLATELET # BLD AUTO: 211 K/UL (ref 150–400)
PMV BLD AUTO: 10 FL (ref 8.9–12.9)
POTASSIUM SERPL-SCNC: 3.8 MMOL/L (ref 3.5–5.1)
RBC # BLD AUTO: 4.52 M/UL (ref 4.1–5.7)
SERVICE CMNT-IMP: ABNORMAL
SODIUM SERPL-SCNC: 132 MMOL/L (ref 136–145)
WBC # BLD AUTO: 5.6 K/UL (ref 4.1–11.1)

## 2023-03-10 PROCEDURE — C1769 GUIDE WIRE: HCPCS | Performed by: SURGERY

## 2023-03-10 PROCEDURE — 74011000250 HC RX REV CODE- 250: Performed by: SURGERY

## 2023-03-10 PROCEDURE — 74011250636 HC RX REV CODE- 250/636: Performed by: ANESTHESIOLOGY

## 2023-03-10 PROCEDURE — 2709999900 HC NON-CHARGEABLE SUPPLY: Performed by: SURGERY

## 2023-03-10 PROCEDURE — B31J1ZZ FLUOROSCOPY OF LEFT UPPER EXTREMITY ARTERIES USING LOW OSMOLAR CONTRAST: ICD-10-PCS | Performed by: SURGERY

## 2023-03-10 PROCEDURE — 74011000636 HC RX REV CODE- 636: Performed by: SURGERY

## 2023-03-10 PROCEDURE — 77030031139 HC SUT VCRL2 J&J -A: Performed by: SURGERY

## 2023-03-10 PROCEDURE — 77030008684 HC TU ET CUF COVD -B: Performed by: ANESTHESIOLOGY

## 2023-03-10 PROCEDURE — 77030003703 HC NDL VASC ACC COOK -A: Performed by: SURGERY

## 2023-03-10 PROCEDURE — 76060000033 HC ANESTHESIA 1 TO 1.5 HR: Performed by: SURGERY

## 2023-03-10 PROCEDURE — 76210000016 HC OR PH I REC 1 TO 1.5 HR: Performed by: SURGERY

## 2023-03-10 PROCEDURE — C1757 CATH, THROMBECTOMY/EMBOLECT: HCPCS | Performed by: SURGERY

## 2023-03-10 PROCEDURE — 73060 X-RAY EXAM OF HUMERUS: CPT

## 2023-03-10 PROCEDURE — 74011000250 HC RX REV CODE- 250: Performed by: GENERAL ACUTE CARE HOSPITAL

## 2023-03-10 PROCEDURE — 74011250636 HC RX REV CODE- 250/636: Performed by: NURSE ANESTHETIST, CERTIFIED REGISTERED

## 2023-03-10 PROCEDURE — 77030014008 HC SPNG HEMSTAT J&J -C: Performed by: SURGERY

## 2023-03-10 PROCEDURE — 74011636637 HC RX REV CODE- 636/637

## 2023-03-10 PROCEDURE — B51W1ZZ FLUOROSCOPY OF DIALYSIS SHUNT/FISTULA USING LOW OSMOLAR CONTRAST: ICD-10-PCS | Performed by: SURGERY

## 2023-03-10 PROCEDURE — 76000 FLUOROSCOPY <1 HR PHYS/QHP: CPT

## 2023-03-10 PROCEDURE — C1725 CATH, TRANSLUMIN NON-LASER: HCPCS | Performed by: SURGERY

## 2023-03-10 PROCEDURE — 74011000250 HC RX REV CODE- 250: Performed by: NURSE ANESTHETIST, CERTIFIED REGISTERED

## 2023-03-10 PROCEDURE — C1876 STENT, NON-COA/NON-COV W/DEL: HCPCS | Performed by: SURGERY

## 2023-03-10 PROCEDURE — 74011250636 HC RX REV CODE- 250/636: Performed by: SURGERY

## 2023-03-10 PROCEDURE — 82962 GLUCOSE BLOOD TEST: CPT

## 2023-03-10 PROCEDURE — 03CY0ZZ EXTIRPATION OF MATTER FROM UPPER ARTERY, OPEN APPROACH: ICD-10-PCS | Performed by: SURGERY

## 2023-03-10 PROCEDURE — 05CY0ZZ EXTIRPATION OF MATTER FROM UPPER VEIN, OPEN APPROACH: ICD-10-PCS | Performed by: SURGERY

## 2023-03-10 PROCEDURE — 77030002986 HC SUT PROL J&J -A: Performed by: SURGERY

## 2023-03-10 PROCEDURE — 057Y3DZ DILATION OF UPPER VEIN WITH INTRALUMINAL DEVICE, PERCUTANEOUS APPROACH: ICD-10-PCS | Performed by: SURGERY

## 2023-03-10 PROCEDURE — 77010033678 HC OXYGEN DAILY

## 2023-03-10 PROCEDURE — 74011250637 HC RX REV CODE- 250/637: Performed by: GENERAL ACUTE CARE HOSPITAL

## 2023-03-10 PROCEDURE — 77030026438 HC STYL ET INTUB CARD -A: Performed by: ANESTHESIOLOGY

## 2023-03-10 PROCEDURE — 65270000046 HC RM TELEMETRY

## 2023-03-10 PROCEDURE — 77030013058 HC DEV INFL ANGIO BSC -B: Performed by: SURGERY

## 2023-03-10 PROCEDURE — 36415 COLL VENOUS BLD VENIPUNCTURE: CPT

## 2023-03-10 PROCEDURE — C1894 INTRO/SHEATH, NON-LASER: HCPCS | Performed by: SURGERY

## 2023-03-10 PROCEDURE — 77030013079 HC BLNKT BAIR HGGR 3M -A: Performed by: ANESTHESIOLOGY

## 2023-03-10 PROCEDURE — 90935 HEMODIALYSIS ONE EVALUATION: CPT

## 2023-03-10 PROCEDURE — 76010000149 HC OR TIME 1 TO 1.5 HR: Performed by: SURGERY

## 2023-03-10 PROCEDURE — 74011636637 HC RX REV CODE- 636/637: Performed by: SURGERY

## 2023-03-10 PROCEDURE — 74011250637 HC RX REV CODE- 250/637: Performed by: SURGERY

## 2023-03-10 PROCEDURE — 85027 COMPLETE CBC AUTOMATED: CPT

## 2023-03-10 PROCEDURE — 77030002916 HC SUT ETHLN J&J -A: Performed by: SURGERY

## 2023-03-10 PROCEDURE — 74011250637 HC RX REV CODE- 250/637: Performed by: INTERNAL MEDICINE

## 2023-03-10 PROCEDURE — 77030002987 HC SUT PROL J&J -B: Performed by: SURGERY

## 2023-03-10 PROCEDURE — 80048 BASIC METABOLIC PNL TOTAL CA: CPT

## 2023-03-10 DEVICE — IMPLANTABLE DEVICE: Type: IMPLANTABLE DEVICE | Site: ARM | Status: FUNCTIONAL

## 2023-03-10 RX ORDER — ONDANSETRON 2 MG/ML
INJECTION INTRAMUSCULAR; INTRAVENOUS AS NEEDED
Status: DISCONTINUED | OUTPATIENT
Start: 2023-03-10 | End: 2023-03-10 | Stop reason: HOSPADM

## 2023-03-10 RX ORDER — LIDOCAINE HYDROCHLORIDE 10 MG/ML
0.1 INJECTION, SOLUTION EPIDURAL; INFILTRATION; INTRACAUDAL; PERINEURAL AS NEEDED
Status: DISCONTINUED | OUTPATIENT
Start: 2023-03-10 | End: 2023-03-10 | Stop reason: HOSPADM

## 2023-03-10 RX ORDER — SODIUM CHLORIDE 0.9 % (FLUSH) 0.9 %
5-40 SYRINGE (ML) INJECTION AS NEEDED
Status: DISCONTINUED | OUTPATIENT
Start: 2023-03-10 | End: 2023-03-10

## 2023-03-10 RX ORDER — FENTANYL CITRATE 50 UG/ML
25 INJECTION, SOLUTION INTRAMUSCULAR; INTRAVENOUS
Status: DISCONTINUED | OUTPATIENT
Start: 2023-03-10 | End: 2023-03-10

## 2023-03-10 RX ORDER — HYDROMORPHONE HYDROCHLORIDE 2 MG/ML
INJECTION, SOLUTION INTRAMUSCULAR; INTRAVENOUS; SUBCUTANEOUS AS NEEDED
Status: DISCONTINUED | OUTPATIENT
Start: 2023-03-10 | End: 2023-03-10 | Stop reason: HOSPADM

## 2023-03-10 RX ORDER — SODIUM CHLORIDE, SODIUM LACTATE, POTASSIUM CHLORIDE, CALCIUM CHLORIDE 600; 310; 30; 20 MG/100ML; MG/100ML; MG/100ML; MG/100ML
50 INJECTION, SOLUTION INTRAVENOUS CONTINUOUS
Status: DISCONTINUED | OUTPATIENT
Start: 2023-03-10 | End: 2023-03-10 | Stop reason: HOSPADM

## 2023-03-10 RX ORDER — HYDROMORPHONE HYDROCHLORIDE 1 MG/ML
0.2 INJECTION, SOLUTION INTRAMUSCULAR; INTRAVENOUS; SUBCUTANEOUS
Status: DISCONTINUED | OUTPATIENT
Start: 2023-03-10 | End: 2023-03-10

## 2023-03-10 RX ORDER — MIDAZOLAM HYDROCHLORIDE 1 MG/ML
INJECTION, SOLUTION INTRAMUSCULAR; INTRAVENOUS AS NEEDED
Status: DISCONTINUED | OUTPATIENT
Start: 2023-03-10 | End: 2023-03-10 | Stop reason: HOSPADM

## 2023-03-10 RX ORDER — PHENYLEPHRINE HCL IN 0.9% NACL 0.4MG/10ML
SYRINGE (ML) INTRAVENOUS AS NEEDED
Status: DISCONTINUED | OUTPATIENT
Start: 2023-03-10 | End: 2023-03-10 | Stop reason: HOSPADM

## 2023-03-10 RX ORDER — HEPARIN SODIUM 1000 [USP'U]/ML
INJECTION, SOLUTION INTRAVENOUS; SUBCUTANEOUS AS NEEDED
Status: DISCONTINUED | OUTPATIENT
Start: 2023-03-10 | End: 2023-03-10 | Stop reason: HOSPADM

## 2023-03-10 RX ORDER — SUCCINYLCHOLINE CHLORIDE 20 MG/ML
INJECTION INTRAMUSCULAR; INTRAVENOUS AS NEEDED
Status: DISCONTINUED | OUTPATIENT
Start: 2023-03-10 | End: 2023-03-10 | Stop reason: HOSPADM

## 2023-03-10 RX ORDER — ONDANSETRON 2 MG/ML
4 INJECTION INTRAMUSCULAR; INTRAVENOUS AS NEEDED
Status: DISCONTINUED | OUTPATIENT
Start: 2023-03-10 | End: 2023-03-10

## 2023-03-10 RX ORDER — SODIUM CHLORIDE 0.9 % (FLUSH) 0.9 %
5-40 SYRINGE (ML) INJECTION EVERY 8 HOURS
Status: DISCONTINUED | OUTPATIENT
Start: 2023-03-10 | End: 2023-03-10 | Stop reason: HOSPADM

## 2023-03-10 RX ORDER — PROPOFOL 10 MG/ML
INJECTION, EMULSION INTRAVENOUS AS NEEDED
Status: DISCONTINUED | OUTPATIENT
Start: 2023-03-10 | End: 2023-03-10 | Stop reason: HOSPADM

## 2023-03-10 RX ORDER — EPHEDRINE SULFATE/0.9% NACL/PF 50 MG/5 ML
SYRINGE (ML) INTRAVENOUS AS NEEDED
Status: DISCONTINUED | OUTPATIENT
Start: 2023-03-10 | End: 2023-03-10 | Stop reason: HOSPADM

## 2023-03-10 RX ORDER — MIDAZOLAM HYDROCHLORIDE 1 MG/ML
1 INJECTION, SOLUTION INTRAMUSCULAR; INTRAVENOUS AS NEEDED
Status: DISCONTINUED | OUTPATIENT
Start: 2023-03-10 | End: 2023-03-10 | Stop reason: HOSPADM

## 2023-03-10 RX ORDER — ACETAMINOPHEN 325 MG/1
650 TABLET ORAL ONCE
Status: DISCONTINUED | OUTPATIENT
Start: 2023-03-10 | End: 2023-03-10 | Stop reason: HOSPADM

## 2023-03-10 RX ORDER — LIDOCAINE HYDROCHLORIDE 20 MG/ML
INJECTION, SOLUTION EPIDURAL; INFILTRATION; INTRACAUDAL; PERINEURAL AS NEEDED
Status: DISCONTINUED | OUTPATIENT
Start: 2023-03-10 | End: 2023-03-10 | Stop reason: HOSPADM

## 2023-03-10 RX ORDER — FENTANYL CITRATE 50 UG/ML
50 INJECTION, SOLUTION INTRAMUSCULAR; INTRAVENOUS AS NEEDED
Status: DISCONTINUED | OUTPATIENT
Start: 2023-03-10 | End: 2023-03-10 | Stop reason: HOSPADM

## 2023-03-10 RX ORDER — SODIUM CHLORIDE 0.9 % (FLUSH) 0.9 %
5-40 SYRINGE (ML) INJECTION AS NEEDED
Status: DISCONTINUED | OUTPATIENT
Start: 2023-03-10 | End: 2023-03-10 | Stop reason: HOSPADM

## 2023-03-10 RX ORDER — KETAMINE HYDROCHLORIDE 10 MG/ML
INJECTION INTRAMUSCULAR; INTRAVENOUS AS NEEDED
Status: DISCONTINUED | OUTPATIENT
Start: 2023-03-10 | End: 2023-03-10 | Stop reason: HOSPADM

## 2023-03-10 RX ORDER — SODIUM CHLORIDE 0.9 % (FLUSH) 0.9 %
5-40 SYRINGE (ML) INJECTION EVERY 8 HOURS
Status: DISCONTINUED | OUTPATIENT
Start: 2023-03-10 | End: 2023-03-10

## 2023-03-10 RX ADMIN — SODIUM CHLORIDE, PRESERVATIVE FREE 10 ML: 5 INJECTION INTRAVENOUS at 16:06

## 2023-03-10 RX ADMIN — FUROSEMIDE 40 MG: 40 TABLET ORAL at 10:00

## 2023-03-10 RX ADMIN — LIDOCAINE HYDROCHLORIDE 40 MG: 20 INJECTION, SOLUTION EPIDURAL; INFILTRATION; INTRACAUDAL; PERINEURAL at 12:53

## 2023-03-10 RX ADMIN — CARVEDILOL 12.5 MG: 12.5 TABLET, FILM COATED ORAL at 10:00

## 2023-03-10 RX ADMIN — ONDANSETRON HYDROCHLORIDE 4 MG: 2 INJECTION, SOLUTION INTRAMUSCULAR; INTRAVENOUS at 13:56

## 2023-03-10 RX ADMIN — Medication 5 MG: at 13:05

## 2023-03-10 RX ADMIN — HYDROMORPHONE HYDROCHLORIDE 0.6 MG: 2 INJECTION, SOLUTION INTRAMUSCULAR; INTRAVENOUS; SUBCUTANEOUS at 13:07

## 2023-03-10 RX ADMIN — Medication 80 MCG: at 13:24

## 2023-03-10 RX ADMIN — PREGABALIN 75 MG: 50 CAPSULE ORAL at 22:37

## 2023-03-10 RX ADMIN — CLONIDINE HYDROCHLORIDE 0.1 MG: 0.1 TABLET ORAL at 04:01

## 2023-03-10 RX ADMIN — SUCRALFATE 1 G: 1 TABLET ORAL at 10:00

## 2023-03-10 RX ADMIN — HEPARIN SODIUM 5000 UNITS: 1000 INJECTION, SOLUTION INTRAVENOUS; SUBCUTANEOUS at 13:14

## 2023-03-10 RX ADMIN — KETAMINE HYDROCHLORIDE 20 MG: 10 INJECTION, SOLUTION INTRAMUSCULAR; INTRAVENOUS at 12:53

## 2023-03-10 RX ADMIN — PANTOPRAZOLE SODIUM 40 MG: 40 TABLET, DELAYED RELEASE ORAL at 10:00

## 2023-03-10 RX ADMIN — SODIUM CHLORIDE, POTASSIUM CHLORIDE, SODIUM LACTATE AND CALCIUM CHLORIDE: 600; 310; 30; 20 INJECTION, SOLUTION INTRAVENOUS at 13:16

## 2023-03-10 RX ADMIN — HYDRALAZINE HYDROCHLORIDE 100 MG: 50 TABLET, FILM COATED ORAL at 16:02

## 2023-03-10 RX ADMIN — SUCRALFATE 1 G: 1 TABLET ORAL at 22:37

## 2023-03-10 RX ADMIN — SODIUM CHLORIDE 40 MCG/MIN: 900 INJECTION, SOLUTION INTRAVENOUS at 13:24

## 2023-03-10 RX ADMIN — HEPARIN SODIUM 5000 UNITS: 5000 INJECTION INTRAVENOUS; SUBCUTANEOUS at 22:37

## 2023-03-10 RX ADMIN — FUROSEMIDE 40 MG: 40 TABLET ORAL at 17:00

## 2023-03-10 RX ADMIN — PROPOFOL 80 MG: 10 INJECTION, EMULSION INTRAVENOUS at 12:53

## 2023-03-10 RX ADMIN — CARVEDILOL 12.5 MG: 12.5 TABLET, FILM COATED ORAL at 17:00

## 2023-03-10 RX ADMIN — SEVELAMER CARBONATE 1600 MG: 800 TABLET, FILM COATED ORAL at 10:00

## 2023-03-10 RX ADMIN — Medication 1 AMPULE: at 16:02

## 2023-03-10 RX ADMIN — HYDRALAZINE HYDROCHLORIDE 100 MG: 50 TABLET, FILM COATED ORAL at 22:37

## 2023-03-10 RX ADMIN — ROSUVASTATIN CALCIUM 40 MG: 40 TABLET, FILM COATED ORAL at 22:37

## 2023-03-10 RX ADMIN — HYDRALAZINE HYDROCHLORIDE 100 MG: 50 TABLET, FILM COATED ORAL at 10:00

## 2023-03-10 RX ADMIN — TAMSULOSIN HYDROCHLORIDE 0.4 MG: 0.4 CAPSULE ORAL at 10:00

## 2023-03-10 RX ADMIN — SEVELAMER CARBONATE 1600 MG: 800 TABLET, FILM COATED ORAL at 17:00

## 2023-03-10 RX ADMIN — Medication 5 MG: at 13:14

## 2023-03-10 RX ADMIN — ASPIRIN 81 MG: 81 TABLET, CHEWABLE ORAL at 10:00

## 2023-03-10 RX ADMIN — Medication 1 AMPULE: at 22:36

## 2023-03-10 RX ADMIN — AMLODIPINE BESYLATE 10 MG: 5 TABLET ORAL at 10:05

## 2023-03-10 RX ADMIN — Medication 80 MCG: at 13:14

## 2023-03-10 RX ADMIN — SODIUM CHLORIDE, PRESERVATIVE FREE 10 ML: 5 INJECTION INTRAVENOUS at 22:38

## 2023-03-10 RX ADMIN — SUCRALFATE 1 G: 1 TABLET ORAL at 16:02

## 2023-03-10 RX ADMIN — SODIUM CHLORIDE, PRESERVATIVE FREE 10 ML: 5 INJECTION INTRAVENOUS at 05:39

## 2023-03-10 RX ADMIN — Medication 3 UNITS: at 22:37

## 2023-03-10 RX ADMIN — FINASTERIDE 5 MG: 5 TABLET, FILM COATED ORAL at 10:00

## 2023-03-10 RX ADMIN — LOSARTAN POTASSIUM 50 MG: 50 TABLET, FILM COATED ORAL at 10:00

## 2023-03-10 RX ADMIN — Medication 10 UNITS: at 09:00

## 2023-03-10 RX ADMIN — SODIUM CHLORIDE, POTASSIUM CHLORIDE, SODIUM LACTATE AND CALCIUM CHLORIDE: 600; 310; 30; 20 INJECTION, SOLUTION INTRAVENOUS at 13:56

## 2023-03-10 RX ADMIN — SUCCINYLCHOLINE CHLORIDE 180 MG: 20 INJECTION, SOLUTION INTRAMUSCULAR; INTRAVENOUS at 12:53

## 2023-03-10 RX ADMIN — MIDAZOLAM HYDROCHLORIDE 2 MG: 1 INJECTION, SOLUTION INTRAMUSCULAR; INTRAVENOUS at 12:48

## 2023-03-10 RX ADMIN — DULOXETINE 60 MG: 30 CAPSULE, DELAYED RELEASE ORAL at 10:00

## 2023-03-10 NOTE — PROGRESS NOTES
1900 Bedside and Verbal shift change report given to [address] (oncoming nurse) by Michel Perez RN (offgoing nurse). Report included the following information SBAR, Kardex, Intake/Output, MAR, Recent Results, and Cardiac Rhythm NSR . End of Shift Note    Bedside shift change report given to Essence Ngo RN (oncoming nurse) by Marry Wiley RN (offgoing nurse). Report included the following information SBAR, Kardex, Intake/Output, MAR, Recent Results, and Cardiac Rhythm NSR    Shift worked:  1900 - 0700     Shift summary and any significant changes:     Pt remains on 3L NC. NPO for anticipated thrombectomy of LUE graft today. Concerns for physician to address:       Zone phone for oncoming shift:          Activity:  Activity Level: Bath Room Privileges  Number times ambulated in hallways past shift: 0  Number of times OOB to chair past shift: 0    Cardiac:   Cardiac Monitoring: Yes      Cardiac Rhythm: Sinus Rhythm    Access:  Current line(s): PIV and HD access     Genitourinary:   Urinary status: oliguric    Respiratory:   O2 Device: Nasal cannula  Chronic home O2 use?: YES  Incentive spirometer at bedside: NO       GI:  Last Bowel Movement Date: 03/03/23  Current diet:  DIET ONE TIME MESSAGE  DIET ONE TIME MESSAGE  DIET ONE TIME MESSAGE  DIET NPO Sips of Water with Meds  Passing flatus: YES  Tolerating current diet: YES       Pain Management:   Patient states pain is manageable on current regimen: YES    Skin:  Corey Score: 20  Interventions: increase time out of bed and nutritional support     Patient Safety:  Fall Score:  Total Score: 2  Interventions: gripper socks and pt to call before getting OOB       Length of Stay:  Expected LOS: 3d 9h  Actual LOS: [address], RN

## 2023-03-10 NOTE — PERIOP NOTES
TRANSFER - IN REPORT:    Verbal report received from [facility] on Kortney Batista  being received from [phone] for ordered procedure      Report consisted of patients Situation, Background, Assessment and   Recommendations(SBAR). Information from the following report(s) SBAR, ED Summary, Procedure Summary, Intake/Output, and MAR was reviewed with the receiving nurse. Opportunity for questions and clarification was provided. Assessment completed upon patients arrival to unit and care assumed.

## 2023-03-10 NOTE — PROGRESS NOTES
Nephrology Progress Note  [facility] / [address], [place], [address]  Phone - [phone]  Fax - [phone]                 Patient: Tamra Warren                   YOB: 1980        Date- 3/10/2023                      Admit Date: 3/4/2023  CC: Follow up for esrd      IMPRESSION & PLAN:   ESRD - on hd mwf at [facility]  Hyponatremia  Fluid overload  Anemia of ckd  Pulmonary edema  hypertension  Uncontrolled DM  H/o Urinary retention requiring hansen cath in pasts  Type 1 diabetes  Non compliant to fluid restrictions. PLAN-  Plan for hemodialysis today after AV fistula declot  Will await vascular surgery recommendation about accessing fistula after declot or to use the Carlos catheter in right IJ. Continue norvasc, coreg, clonidine, hydralazine, losartan. Continue hd mwf  Follow bmp     Subjective: Interval History:   Seen and examined today  Shortness of breath much better  N.p.o. for surgery this morning  Objective:   Vitals:    03/09/23 2235 03/09/23 2358 03/10/23 0401 03/10/23 0730   BP: (!) 165/88 (!) 155/84 (!) 155/78 (!) 174/86   Pulse: 77 75 79 72   Resp: 18  20 18   Temp: 98.7 °F (37.1 °C)  98.1 °F (36.7 °C) 98 °F (36.7 °C)   SpO2: 91% 95% 92% 95%   Weight:   69.9 kg (154 lb 1.6 oz)       03/09 0701 - 03/10 0700  In: 1200 [P.O.:1200]  Out: 50 [Urine:50]    Last 3 Recorded Weights in this Encounter    03/07/23 0525 03/08/23 0550 03/10/23 0401   Weight: 73.8 kg (162 lb 11.2 oz) 72.9 kg (160 lb 11.5 oz) 69.9 kg (154 lb 1.6 oz)        Physical exam:    GEN:  NAD  NECK:  Supple, no thyromegaly  RESP: Clear  b/l, no  wheezing,   CVS: RRR,S1,S2   NEURO: non focal, normal speech  EXT: Edema +nt       Right ij carlos +  Left arm avg - no bruit or thrill    Chart reviewed. Pertinent Notes reviewed.      Data Review :  Recent Labs     03/08/23  0920   *   K 4.2   CL 97   CO2 27 BUN 28*   CREA 4.28*   *   CA 7.8*   PHOS 3.9       Recent Labs     03/08/23  0920   WBC 5.6   HGB 12.0*   HCT 40.6          No results for input(s): FE, TIBC, PSAT, FERR in the last 72 hours.    Lab Results   Component Value Date/Time    Hemoglobin A1c 12.3 (H) 03/04/2023 03:37 PM    Hemoglobin A1c 10.1 (H) 11/29/2022 08:48 PM    Hemoglobin A1c 7.2 (H) 09/26/2022 09:34 AM        Lab Results   Component Value Date/Time    Microalbumin/Creat ratio (mg/g creat) 11,439 (H) 04/01/2021 10:00 AM    Microalbumin,urine random 151.00 04/01/2021 10:00 AM     US Results (most recent):  Medication list  reviewed  Current Facility-Administered Medications   Medication Dose Route Frequency    insulin glargine (LANTUS) injection 10 Units  10 Units SubCUTAneous DAILY    oxyCODONE IR (ROXICODONE) tablet 2.5 mg  2.5 mg Oral Q6H PRN    heparin (porcine) 1,000 unit/mL injection 1,400 Units  1,400 Units InterCATHeter DIALYSIS PRN    And    heparin (porcine) 1,000 unit/mL injection 1,100 Units  1,100 Units InterCATHeter DIALYSIS PRN    albumin human 25% (BUMINATE) solution 12.5 g  12.5 g IntraVENous DIALYSIS PRN    heparin (porcine) 1,000 unit/mL injection 2,500 Units  2,500 Units Hemodialysis DIALYSIS PRN    sodium chloride (NS) flush 5-40 mL  5-40 mL IntraVENous Q8H    sodium chloride (NS) flush 5-40 mL  5-40 mL IntraVENous PRN    insulin lispro (HUMALOG) injection   SubCUTAneous AC&HS    glucose chewable tablet 16 g  4 Tablet Oral PRN    glucagon (GLUCAGEN) injection 1 mg  1 mg IntraMUSCular PRN    dextrose 10% infusion 0-250 mL  0-250 mL IntraVENous PRN    cloNIDine HCL (CATAPRES) tablet 0.1 mg  0.1 mg Oral Q6H PRN    acetaminophen (TYLENOL) tablet 650 mg  650 mg Oral Q6H PRN    Or    acetaminophen (TYLENOL) suppository 650 mg  650 mg Rectal Q6H PRN    polyethylene glycol (MIRALAX) packet 17 g  17 g Oral DAILY PRN    ondansetron (ZOFRAN ODT) tablet 4 mg  4 mg Oral Q8H PRN    Or    ondansetron (ZOFRAN) injection 4 mg  4 mg IntraVENous Q6H PRN    amLODIPine (NORVASC) tablet 10 mg  10 mg Oral DAILY    aspirin chewable tablet 81 mg  81 mg Oral DAILY    carvediloL (COREG) tablet 12.5 mg  12.5 mg Oral BID WITH MEALS    cloNIDine (CATAPRES) 0.1 mg/24 hr patch 1 Patch  1 Patch TransDERmal Q7D    DULoxetine (CYMBALTA) capsule 60 mg  60 mg Oral DAILY    finasteride (PROSCAR) tablet 5 mg  5 mg Oral DAILY    hydrALAZINE (APRESOLINE) tablet 100 mg  100 mg Oral TID    losartan (COZAAR) tablet 50 mg  50 mg Oral DAILY    pantoprazole (PROTONIX) tablet 40 mg  40 mg Oral DAILY    pregabalin (LYRICA) capsule 75 mg  75 mg Oral QHS    rosuvastatin (CRESTOR) tablet 40 mg  40 mg Oral QHS    sevelamer carbonate (RENVELA) tab 1,600 mg  1,600 mg Oral TID WITH MEALS    sucralfate (CARAFATE) tablet 1 g  1 g Oral AC&HS    tamsulosin (FLOMAX) capsule 0.4 mg  0.4 mg Oral DAILY    heparin (porcine) injection 5,000 Units  5,000 Units SubCUTAneous Q12H    furosemide (LASIX) tablet 40 mg  40 mg Oral BID        Harriett Robbins MD  3/10/2023

## 2023-03-10 NOTE — PROGRESS NOTES
Bedside shift change report given to Stuart Coffman (oncoming nurse) by Fitz Stoner RN (offgoing nurse). Report included the following information SBAR, Kardex, MAR, and Recent Results.

## 2023-03-10 NOTE — PROGRESS NOTES
Hospitalist Progress Note    NAME: Mark Gomez   :  1980   MRN:  [id number]       Assessment / Plan: Altered mental status secondary to severe hypoglycemia resolved   Uncontrolled diabetes mellitus with labile blood sugars  Thrombectomy LUE AV loop graft  Patient was lethargic on  as blood sugar was in 26, improved with D10 to 250 mL x 2  Was more alert oriented upon my evaluation  Initially was treated for DKA, now having hypoglycemias  Lantus reduced from 10 units to 5 units  Continue sliding scale insulin  : Sugar in the 400s, Lantus increased to 10 unit, added mealtime insulin 10 unit before each meal , continue sliding scale  Educator consulted  03/10:  blood Sugar controlled   patient underwent   Thrombectomy LUE AV loop graft                          Completion fistulagram w/fluoro                          Angioplasty/stenting venous outflow anastomosis (8x3)                          L arm arteriogram                          Angioplasty inflow anastomosis     Code blue on 3/7  Became responsive after round of CPR  Most likely due to too much pain medication  Pain medication was adjusted  As per mother, pt had few similar episodes at home    Acute on chronic hypoxic respiratory failure, chronically on 2 L/M  S/s fluid overload in the setting of missing HD  Pulmonary edema and B/L pleural effusion, L>right  Nephrology consulted. HD as per nephrology  Continue oxygen and wean as tolerated      Right back Pain:  Likely s/s pleural effusion vs right pyelonephritis. UA neg  renal/bladder US neg  Lower percocet dose to 2.5 mg, discontinue Toradol as it is contraindicated in hemodialysis patient  CT abd pelvis reviewed   IMPRESSION  1. Bilateral pleural effusions and lower lobe atelectasis.   2.  No evidence of nephrolithiasis or flank hematoma/injury  Hypertension:  Resume home medications- coreg, amlodipine, hydralazine, losartan      ESRD on HD  Nephrology consulted  HD as per nephrology  AVF clotted, Vascular consulted  New carlos cath inserted on 3/6     Suspected UTI  Started on ceftriaxone, follow-up urine cultures if able to obtain     Code Status: full code  Surrogate Decision Maker:mother   DVT Prophylaxis: sc heparin  18.5 - 24.9 Normal weight / Body mass index is 22.76 kg/m². Recommended Disposition: Home w/Family  Anticipated Discharge Date: 03/11  SHAE Barriers: Thrombectomy LUE        Subjective: Follow-up pulmonary edema  No acute issues       Review of Systems:  Symptom Y/N Comments  Symptom Y/N Comments   Fever/Chills n   Chest Pain n    Poor Appetite    Edema     Cough    Abdominal Pain n    Sputum    Joint Pain     SOB/JOHNSTON n   Pruritis/Rash     Nausea/vomit n   Tolerating PT/OT     Diarrhea    Tolerating Diet y    Constipation    Other       PO intake: No data found. Wt Readings from Last 10 Encounters:   03/10/23 69.9 kg (154 lb 1.6 oz)   01/10/23 85.3 kg (188 lb)   12/29/22 78 kg (171 lb 15.3 oz)   12/27/22 78 kg (171 lb 15.3 oz)   12/21/22 78 kg (171 lb 15.3 oz)   12/06/22 80.6 kg (177 lb 11.1 oz)   11/02/22 71.8 kg (158 lb 6.4 oz)   10/25/22 69.2 kg (152 lb 8.9 oz)   09/28/22 75.8 kg (167 lb 3.2 oz)   09/13/22 72.1 kg (159 lb)       Objective:     VITALS:   Last 24hrs VS reviewed since prior progress note.  Most recent are:  Patient Vitals for the past 24 hrs:   Temp Pulse Resp BP SpO2   03/10/23 1515 97.9 °F (36.6 °C) 69 12 126/68 95 %   03/10/23 1500 -- 69 17 126/70 95 %   03/10/23 1455 -- 70 16 126/70 95 %   03/10/23 1445 -- 69 17 122/62 95 %   03/10/23 1435 -- 67 15 117/61 93 %   03/10/23 1430 -- 67 20 115/69 94 %   03/10/23 1425 -- 67 12 114/63 94 %   03/10/23 1420 -- 66 10 116/64 98 %   03/10/23 1415 -- 65 9 113/63 98 %   03/10/23 1410 (!) 96.6 °F (35.9 °C) 65 12 (!) 106/59 98 %   03/10/23 1053 97.8 °F (36.6 °C) 73 19 (!) 147/86 94 %   03/10/23 1025 98.3 °F (36.8 °C) 70 16 (!) 147/86 95 %   03/10/23 0730 98 °F (36.7 °C) 72 18 (!) 174/86 95 %   03/10/23 0401 98.1 °F (36.7 °C) 79 20 (!) 155/78 92 %   03/09/23 2358 -- 75 -- (!) 155/84 95 %   03/09/23 2235 98.7 °F (37.1 °C) 77 18 (!) 165/88 91 %   03/09/23 1940 98.2 °F (36.8 °C) 76 20 (!) 158/79 90 %   03/09/23 1609 97.7 °F (36.5 °C) 75 18 135/86 92 %         Intake/Output Summary (Last 24 hours) at 3/10/2023 1540  Last data filed at 3/10/2023 1356  Gross per 24 hour   Intake 900 ml   Output 225 ml   Net 675 ml          I had a face to face encounter, and independently examined this patient as outlined below:    PHYSICAL EXAM:  General:    No distress     HEENT: Atraumatic, anicteric sclerae, pink conjunctivae, MMM  Neck:  Supple, symmetrical  Lungs:   CTA. No Wheezing/Rhonchi. No rales. No tenderness. No Accessory muscle use. CVS:   Regular rhythm. No murmur. No JVD. L UE AVF w + thrill   GI/:   Soft. NT. ND. BS normal. Mild R CVAT  Extremities: No edema. No cyanosis. No clubbing. Skin:     Not pale. Not Jaundiced. No rashes   Psych:  Good insight. Not depressed. Not anxious or agitated. Neurologic: Alert and oriented X 4. EOMs intact. No facial asymmetry. No slurred speech. Symmetrical strength, Sensation grossly intact. Labs     I reviewed today's most current labs and imaging studies. Pertinent labs include:  Recent Labs     03/08/23 0920   WBC 5.6   HGB 12.0*   HCT 40.6          Recent Labs     03/10/23  0952 03/08/23 0920   * 129*   K 3.8 4.2    97   CO2 22 27   * 262*   BUN 47* 28*   CREA 5.71* 4.28*   CA 7.9* 7.8*   PHOS  --  3.9   ALB  --  2.4*       CT ABD PELV W CONT    Result Date: 3/7/2023  1. Bilateral pleural effusions and lower lobe atelectasis. 2.  No evidence of nephrolithiasis or flank hematoma/injury.     US RETROPERITONEUM LTD    Result Date: 3/4/2023  Unremarkable Renal Sonogram.     XR CHEST PORT    Result Date: 3/4/2023  Pulmonary edema with moderate left and small right pleural effusions, and associated bibasilar airspace disease. IR INSERT NON TUNL CVC OVER 5 YRS    Result Date: 3/6/2023  1. Successful placement of right internal jugular temporary dialysis catheter. Catheter ready for immediate use. No results found. 10/16/22    ECHO ADULT COMPLETE 10/27/2022 10/27/2022    Interpretation Summary    Left Ventricle: Normal left ventricular systolic function with a visually estimated EF of 45 - 50%. Left ventricle size is normal. Normal wall thickness. Normal wall motion. Aortic Valve: Tricuspid valve.     Signed by: Edwin Ryan MD on 10/27/2022  8:23 AM     All Micro Results       None              Current Medications:     Current Facility-Administered Medications:     alcohol 62% (NOZIN) nasal  3 Ampule, 3 Ampule, Topical, ONCE, Mayra Madison MD    alcohol 62% (NOZIN) nasal  1 Ampule, 1 Ampule, Topical, Q12H, Mayra Madison MD    insulin glargine (LANTUS) injection 10 Units, 10 Units, SubCUTAneous, DAILY, Mayra Madison MD, 10 Units at 03/10/23 0900    oxyCODONE IR (ROXICODONE) tablet 2.5 mg, 2.5 mg, Oral, Q6H PRN, Mayra Madison MD    heparin (porcine) 1,000 unit/mL injection 1,400 Units, 1,400 Units, InterCATHeter, DIALYSIS PRN, 1,400 Units at 03/08/23 1252 **AND** heparin (porcine) 1,000 unit/mL injection 1,100 Units, 1,100 Units, InterCATHeter, DIALYSIS PRN, Mayra Madison MD, 1,100 Units at 03/08/23 1253    albumin human 25% (BUMINATE) solution 12.5 g, 12.5 g, IntraVENous, DIALYSIS PRN, Mayra Madison MD    heparin (porcine) 1,000 unit/mL injection 2,500 Units, 2,500 Units, Hemodialysis, DIALYSIS PRN, Mayra Madison MD, 2,500 Units at 03/07/23 0313    sodium chloride (NS) flush 5-40 mL, 5-40 mL, IntraVENous, Q8H, Mayra Madison MD, 10 mL at 03/10/23 0539    sodium chloride (NS) flush 5-40 mL, 5-40 mL, IntraVENous, PRN, Mayra Madison MD    insulin lispro (HUMALOG) injection, , SubCUTAneous, AC&HS, Edwin Haile MD, 2 Units at 03/09/23 2102    glucose chewable tablet 16 g, 4 Tablet, Oral, PRN, Edwin Haile MD    glucagon (GLUCAGEN) injection 1 mg, 1 mg, IntraMUSCular, PRN, Edwin Haile MD    dextrose 10% infusion 0-250 mL, 0-250 mL, IntraVENous, PRN, Ediwn Haile MD, 250 mL at 03/08/23 0744    cloNIDine HCL (CATAPRES) tablet 0.1 mg, 0.1 mg, Oral, Q6H PRN, Edwin Haile MD, 0.1 mg at 03/10/23 0401    acetaminophen (TYLENOL) tablet 650 mg, 650 mg, Oral, Q6H PRN, 650 mg at 03/07/23 2129 **OR** acetaminophen (TYLENOL) suppository 650 mg, 650 mg, Rectal, Q6H PRN, Edwin Haile MD    polyethylene glycol (MIRALAX) packet 17 g, 17 g, Oral, DAILY PRN, Edwin Haile MD    ondansetron (ZOFRAN ODT) tablet 4 mg, 4 mg, Oral, Q8H PRN **OR** ondansetron (ZOFRAN) injection 4 mg, 4 mg, IntraVENous, Q6H PRN, Edwin Haile MD    amLODIPine (NORVASC) tablet 10 mg, 10 mg, Oral, DAILY, Edwin Haile MD, 10 mg at 03/10/23 1005    aspirin chewable tablet 81 mg, 81 mg, Oral, DAILY, Edwin Haile MD, 81 mg at 03/10/23 1000    carvediloL (COREG) tablet 12.5 mg, 12.5 mg, Oral, BID WITH MEALS, Edwin Haile MD, 12.5 mg at 03/10/23 1000    cloNIDine (CATAPRES) 0.1 mg/24 hr patch 1 Patch, 1 Patch, TransDERmal, Q7D, Edwin Haile MD    DULoxetine (CYMBALTA) capsule 60 mg, 60 mg, Oral, DAILY, Edwin Haile MD, 60 mg at 03/10/23 1000    finasteride (PROSCAR) tablet 5 mg, 5 mg, Oral, DAILY, Edwin Haile MD, 5 mg at 03/10/23 1000    hydrALAZINE (APRESOLINE) tablet 100 mg, 100 mg, Oral, TID, Edwin Haile MD, 100 mg at 03/10/23 1000    losartan (COZAAR) tablet 50 mg, 50 mg, Oral, DAILY, Edwin Haile MD, 50 mg at 03/10/23 1000    pantoprazole (PROTONIX) tablet 40 mg, 40 mg, Oral, DAILY, Edwin Haile MD, 40 mg at 03/10/23 1000    pregabalin (LYRICA) capsule 75 mg, 75 mg, Oral, QHS, Edwin Haile MD, 75 mg at 03/09/23 2237    rosuvastatin (CRESTOR) tablet 40 mg, 40 mg, Oral, QHS, Kellie Tellez MD, 40 mg at 03/09/23 2238    sevelamer carbonate (RENVELA) tab 1,600 mg, 1,600 mg, Oral, TID WITH MEALS, Kellie Tellez MD, 1,600 mg at 03/10/23 1000    sucralfate (CARAFATE) tablet 1 g, 1 g, Oral, AC&HS, Kellie Tellez MD, 1 g at 03/10/23 1000    tamsulosin (FLOMAX) capsule 0.4 mg, 0.4 mg, Oral, DAILY, Kellie Tellez MD, 0.4 mg at 03/10/23 1000    heparin (porcine) injection 5,000 Units, 5,000 Units, SubCUTAneous, Q12H, Kellie Tellez MD, 5,000 Units at 03/09/23 2102    furosemide (LASIX) tablet 40 mg, 40 mg, Oral, BID, Kellie Tellez MD, 40 mg at 03/10/23 1000     Procedures: see electronic medical records for all procedures/Xrays and details which were not copied into this note but were reviewed prior to creation of Plan. Reviewed most current lab test results and cultures  YES  Reviewed most current radiology test results   YES  Review and summation of old records today    NO  Reviewed patient's current orders and MAR    YES  PMH/SH reviewed - no change compared to H&P  ________________________________________________________________________  Care Plan discussed with:    Comments   Patient x    Family  x    RN x    Care Manager x    Consultant                       x Multidiciplinary team rounds were held today with , nursing, pharmacist and clinical coordinator. Patient's plan of care was discussed; medications were reviewed and discharge planning was addressed.      ________________________________________________________________________  Total NON critical care TIME:   25Minutes    Total CRITICAL CARE TIME Spent:Minutes non procedure based      Comments   >50% of visit spent in counseling and coordination of care x     This includes time during multidisciplinary rounds if indicated above   ________________________________________________________________________  Arnulfo Lee MD

## 2023-03-10 NOTE — BRIEF OP NOTE
Brief Postoperative Note    Patient: Jorge Laboy  YOB: 1980  MRN: [id number]    Date of Procedure: 3/10/2023     Pre-Op Diagnosis: ESRD    Post-Op Diagnosis: ESRD      Procedure(s): Thrombectomy LUE AV loop graft                          Completion fistulagram w/fluoro                          Angioplasty/stenting venous outflow anastomosis (8x3)                          L arm arteriogram                          Angioplasty inflow anastomosis       Surgeon(s):  Arnoldo Ríos MD    Anesthesia: General     Estimated Blood Loss (mL): 691     Complications: None    Implants:   Implant Name Type Inv. Item Serial No.  Lot No. LRB No. Used Action   STENT BILI James Pain [id number] -- PROTEGE EVERFLEX - SNA  STENT BILI James Pain [id number] -- PROTEGE EVERFLEX NA Jose Wang [id number] Left 1 Implanted       Findings: Inflow difficult to re-establish.  Required angioplasty/over-the-wire bala     Electronically Signed by Fabiana Vernon MD on 3/10/2023 at 2:17 PM

## 2023-03-10 NOTE — PERIOP NOTES
Handoff Report from Operating Room to PACU    Report received from Tone May RN and Shelley Collins CRNA regarding Martinez Lanier. Surgeon(s):  Michael Morataya MD  And Procedure(s) (LRB):  THROMBECTOMY OF LEFT UPPER EXTREMITY GRAFT (Left)  confirmed   with dressings discussed. Anesthesia type, drugs, patient history, complications, estimated blood loss, vital signs, intake and output, and last pain medication, lines, and temperature were reviewed.

## 2023-03-10 NOTE — ANESTHESIA PREPROCEDURE EVALUATION
Relevant Problems   CARDIOVASCULAR   (+) HTN (hypertension)      RENAL FAILURE   (+) FELECIA (acute kidney injury) ([facility] [address]   (+) Chronic kidney disease, stage 3a (HCC)   (+) Hydronephrosis of right kidney      ENDOCRINE   (+) DKA, type 1, not at goal [facility])       Anesthetic History     PONV          Review of Systems / Medical History  Patient summary reviewed, nursing notes reviewed and pertinent labs reviewed    Pulmonary                Comments: Bilateral pleural effusions and lower lobe atelectasis. Chronic hypoxic respiratory failure, chronically on 2 L/M   Neuro/Psych     seizures    Psychiatric history     Cardiovascular  Within defined limits  Hypertension              Exercise tolerance: <4 METS  Comments: Left Ventricle: Normal left ventricular systolic function with a visually estimated EF of 45 - 50%. Left ventricle size is normal. Normal wall thickness. Normal wall motion.  Aortic Valve: Tricuspid valve.      GI/Hepatic/Renal         Renal disease: dialysis       Endo/Other    Diabetes: type 1, using insulin         Other Findings            Physical Exam    Airway  Mallampati: II  TM Distance: 4 - 6 cm  Neck ROM: normal range of motion   Mouth opening: Normal     Cardiovascular  Regular rate and rhythm,  S1 and S2 normal,  no murmur, click, rub, or gallop             Dental  No notable dental hx       Pulmonary  Breath sounds clear to auscultation               Abdominal  GI exam deferred       Other Findings            Anesthetic Plan    ASA: 4  Anesthesia type: general    Monitoring Plan: BIS      Induction: Intravenous  Anesthetic plan and risks discussed with: Patient

## 2023-03-10 NOTE — PROGRESS NOTES
Problem: Risk for Spread of Infection  Goal: Prevent transmission of infectious organism to others  Description: Prevent the transmission of infectious organisms to other patients, staff members, and visitors. Outcome: Progressing Towards Goal     Problem: Patient Education:  Go to Education Activity  Goal: Patient/Family Education  Outcome: Progressing Towards Goal     Problem: Falls - Risk of  Goal: *Absence of Falls  Description: Document [place] Fall Risk and appropriate interventions in the flowsheet. Outcome: Progressing Towards Goal  Note: Fall Risk Interventions:            Medication Interventions: Bed/chair exit alarm                   Problem: Patient Education: Go to Patient Education Activity  Goal: Patient/Family Education  Outcome: Progressing Towards Goal     Problem: Diabetes Self-Management  Goal: *Disease process and treatment process  Description: Define diabetes and identify own type of diabetes; list 3 options for treating diabetes. Outcome: Progressing Towards Goal  Goal: *Incorporating nutritional management into lifestyle  Description: Describe effect of type, amount and timing of food on blood glucose; list 3 methods for planning meals. Outcome: Progressing Towards Goal  Goal: *Incorporating physical activity into lifestyle  Description: State effect of exercise on blood glucose levels. Outcome: Progressing Towards Goal  Goal: *Developing strategies to promote health/change behavior  Description: Define the ABC's of diabetes; identify appropriate screenings, schedule and personal plan for screenings. Outcome: Progressing Towards Goal  Goal: *Using medications safely  Description: State effect of diabetes medications on diabetes; name diabetes medication taking, action and side effects. Outcome: Progressing Towards Goal  Goal: *Monitoring blood glucose, interpreting and using results  Description: Identify recommended blood glucose targets  and personal targets.   Outcome: Progressing Towards Goal  Goal: *Prevention, detection, treatment of acute complications  Description: List symptoms of hyper- and hypoglycemia; describe how to treat low blood sugar and actions for lowering  high blood glucose level. Outcome: Progressing Towards Goal  Goal: *Prevention, detection and treatment of chronic complications  Description: Define the natural course of diabetes and describe the relationship of blood glucose levels to long term complications of diabetes.   Outcome: Progressing Towards Goal  Goal: *Developing strategies to address psychosocial issues  Description: Describe feelings about living with diabetes; identify support needed and support network  Outcome: Progressing Towards Goal  Goal: *Insulin pump training  Outcome: Progressing Towards Goal  Goal: *Sick day guidelines  Outcome: Progressing Towards Goal  Goal: *Patient Specific Goal (EDIT GOAL, INSERT TEXT)  Outcome: Progressing Towards Goal     Problem: Patient Education: Go to Patient Education Activity  Goal: Patient/Family Education  Outcome: Progressing Towards Goal     Problem: Infection - Risk of, Central Venous Catheter-Associated Bloodstream Infection  Goal: *Absence of infection signs and symptoms  Outcome: Progressing Towards Goal     Problem: Patient Education: Go to Patient Education Activity  Goal: Patient/Family Education  Outcome: Progressing Towards Goal

## 2023-03-10 NOTE — ANESTHESIA POSTPROCEDURE EVALUATION
Procedure(s):  THROMBECTOMY OF LEFT UPPER EXTREMITY GRAFT. general    Anesthesia Post Evaluation        Patient location during evaluation: PACU  Note status: Adequate. Level of consciousness: responsive to verbal stimuli and sleepy but conscious  Pain management: satisfactory to patient  Airway patency: patent  Anesthetic complications: no  Cardiovascular status: acceptable  Respiratory status: acceptable  Hydration status: acceptable  Comments: +Post-Anesthesia Evaluation and Assessment    Patient: Frank Baker MRN: [id number]  SSN: [id number]   YOB: 1980  Age: 38 y.o. Sex: male      Cardiovascular Function/Vital Signs    /62   Pulse 69   Temp (!) 35.9 °C (96.6 °F)   Resp 17   Wt 69.9 kg (154 lb 1.6 oz)   SpO2 95%   BMI 22.76 kg/m²     Patient is status post Procedure(s):  THROMBECTOMY OF LEFT UPPER EXTREMITY GRAFT. Nausea/Vomiting: Controlled. Postoperative hydration reviewed and adequate. Pain:  Pain Scale 1: FLACC (03/10/23 1410)  Pain Intensity 1: 0 (03/10/23 1053)   Managed. Neurological Status:   Neuro (WDL): Exceptions to WDL (03/10/23 1410)   At baseline. Mental Status and Level of Consciousness: Arousable. Pulmonary Status:   O2 Device: Nasal cannula (03/10/23 1420)   Adequate oxygenation and airway patent. Complications related to anesthesia: None    Post-anesthesia assessment completed. No concerns. Signed By: Leticia Conde MD    3/10/2023  Post anesthesia nausea and vomiting:  controlled      INITIAL Post-op Vital signs:   Vitals Value Taken Time   /62 03/10/23 1445   Temp 35.9 °C (96.6 °F) 03/10/23 1410   Pulse 69 03/10/23 1459   Resp 14 03/10/23 1459   SpO2 95 % 03/10/23 1459   Vitals shown include unvalidated device data.

## 2023-03-10 NOTE — PERIOP NOTES
TRANSFER - OUT REPORT:    Verbal report given to ASAD Salazar(name) on Catalina Garza  being transferred to [facility](unit) for routine post - op       Report consisted of patients Situation, Background, Assessment and   Recommendations(SBAR). Information from the following report(s) OR Summary, Procedure Summary, Intake/Output, and MAR was reviewed with the receiving nurse. Opportunity for questions and clarification was provided. Patient transported with:   Monitor  O2 @ 3 liters  Registered Nurse  Tech    Reviewed with receiving nurse that only PACU orders were released. Postop orders will need to be released when pt gets to room.

## 2023-03-11 LAB
GLUCOSE BLD STRIP.AUTO-MCNC: 166 MG/DL (ref 65–117)
GLUCOSE BLD STRIP.AUTO-MCNC: 189 MG/DL (ref 65–117)
GLUCOSE BLD STRIP.AUTO-MCNC: 192 MG/DL (ref 65–117)
GLUCOSE BLD STRIP.AUTO-MCNC: 373 MG/DL (ref 65–117)
GLUCOSE BLD STRIP.AUTO-MCNC: 375 MG/DL (ref 65–117)
SERVICE CMNT-IMP: ABNORMAL

## 2023-03-11 PROCEDURE — 74011250637 HC RX REV CODE- 250/637: Performed by: SURGERY

## 2023-03-11 PROCEDURE — 74011636637 HC RX REV CODE- 636/637: Performed by: SURGERY

## 2023-03-11 PROCEDURE — 51798 US URINE CAPACITY MEASURE: CPT

## 2023-03-11 PROCEDURE — 82962 GLUCOSE BLOOD TEST: CPT

## 2023-03-11 PROCEDURE — 74011636637 HC RX REV CODE- 636/637: Performed by: INTERNAL MEDICINE

## 2023-03-11 PROCEDURE — 74011250636 HC RX REV CODE- 250/636: Performed by: SURGERY

## 2023-03-11 PROCEDURE — 77010033678 HC OXYGEN DAILY

## 2023-03-11 PROCEDURE — 65270000046 HC RM TELEMETRY

## 2023-03-11 PROCEDURE — 74011000250 HC RX REV CODE- 250: Performed by: SURGERY

## 2023-03-11 RX ORDER — INSULIN LISPRO 100 [IU]/ML
10 INJECTION, SOLUTION INTRAVENOUS; SUBCUTANEOUS ONCE
Status: COMPLETED | OUTPATIENT
Start: 2023-03-11 | End: 2023-03-11

## 2023-03-11 RX ORDER — INSULIN GLARGINE 100 [IU]/ML
15 INJECTION, SOLUTION SUBCUTANEOUS DAILY
Status: DISCONTINUED | OUTPATIENT
Start: 2023-03-12 | End: 2023-03-13 | Stop reason: HOSPADM

## 2023-03-11 RX ORDER — INSULIN LISPRO 100 [IU]/ML
5 INJECTION, SOLUTION INTRAVENOUS; SUBCUTANEOUS
Status: DISCONTINUED | OUTPATIENT
Start: 2023-03-11 | End: 2023-03-12

## 2023-03-11 RX ADMIN — SUCRALFATE 1 G: 1 TABLET ORAL at 21:29

## 2023-03-11 RX ADMIN — CARVEDILOL 12.5 MG: 12.5 TABLET, FILM COATED ORAL at 09:09

## 2023-03-11 RX ADMIN — FINASTERIDE 5 MG: 5 TABLET, FILM COATED ORAL at 09:09

## 2023-03-11 RX ADMIN — Medication 5 UNITS: at 13:39

## 2023-03-11 RX ADMIN — SODIUM CHLORIDE, PRESERVATIVE FREE 10 ML: 5 INJECTION INTRAVENOUS at 06:15

## 2023-03-11 RX ADMIN — CARVEDILOL 12.5 MG: 12.5 TABLET, FILM COATED ORAL at 17:14

## 2023-03-11 RX ADMIN — OXYCODONE HYDROCHLORIDE 2.5 MG: 5 TABLET ORAL at 21:29

## 2023-03-11 RX ADMIN — HEPARIN SODIUM 1100 UNITS: 1000 INJECTION INTRAVENOUS; SUBCUTANEOUS at 00:27

## 2023-03-11 RX ADMIN — SEVELAMER CARBONATE 1600 MG: 800 TABLET, FILM COATED ORAL at 11:31

## 2023-03-11 RX ADMIN — HYDRALAZINE HYDROCHLORIDE 100 MG: 50 TABLET, FILM COATED ORAL at 15:08

## 2023-03-11 RX ADMIN — SODIUM CHLORIDE, PRESERVATIVE FREE 10 ML: 5 INJECTION INTRAVENOUS at 21:31

## 2023-03-11 RX ADMIN — SODIUM CHLORIDE, PRESERVATIVE FREE 10 ML: 5 INJECTION INTRAVENOUS at 13:39

## 2023-03-11 RX ADMIN — Medication 10 UNITS: at 09:10

## 2023-03-11 RX ADMIN — HEPARIN SODIUM 5000 UNITS: 5000 INJECTION INTRAVENOUS; SUBCUTANEOUS at 09:09

## 2023-03-11 RX ADMIN — SUCRALFATE 1 G: 1 TABLET ORAL at 11:31

## 2023-03-11 RX ADMIN — PREGABALIN 75 MG: 50 CAPSULE ORAL at 21:29

## 2023-03-11 RX ADMIN — SEVELAMER CARBONATE 1600 MG: 800 TABLET, FILM COATED ORAL at 17:25

## 2023-03-11 RX ADMIN — SUCRALFATE 1 G: 1 TABLET ORAL at 17:14

## 2023-03-11 RX ADMIN — Medication 10 UNITS: at 11:47

## 2023-03-11 RX ADMIN — ROSUVASTATIN CALCIUM 40 MG: 40 TABLET, FILM COATED ORAL at 21:30

## 2023-03-11 RX ADMIN — FUROSEMIDE 40 MG: 40 TABLET ORAL at 18:00

## 2023-03-11 RX ADMIN — OXYCODONE HYDROCHLORIDE 2.5 MG: 5 TABLET ORAL at 09:08

## 2023-03-11 RX ADMIN — OXYCODONE HYDROCHLORIDE 2.5 MG: 5 TABLET ORAL at 15:08

## 2023-03-11 RX ADMIN — SEVELAMER CARBONATE 1600 MG: 800 TABLET, FILM COATED ORAL at 09:09

## 2023-03-11 RX ADMIN — LOSARTAN POTASSIUM 50 MG: 50 TABLET, FILM COATED ORAL at 09:09

## 2023-03-11 RX ADMIN — Medication 1 AMPULE: at 09:12

## 2023-03-11 RX ADMIN — Medication 5 UNITS: at 17:16

## 2023-03-11 RX ADMIN — AMLODIPINE BESYLATE 10 MG: 5 TABLET ORAL at 09:09

## 2023-03-11 RX ADMIN — PANTOPRAZOLE SODIUM 40 MG: 40 TABLET, DELAYED RELEASE ORAL at 09:09

## 2023-03-11 RX ADMIN — HEPARIN SODIUM 5000 UNITS: 5000 INJECTION INTRAVENOUS; SUBCUTANEOUS at 21:30

## 2023-03-11 RX ADMIN — SUCRALFATE 1 G: 1 TABLET ORAL at 09:10

## 2023-03-11 RX ADMIN — DULOXETINE 60 MG: 30 CAPSULE, DELAYED RELEASE ORAL at 09:09

## 2023-03-11 RX ADMIN — FUROSEMIDE 40 MG: 40 TABLET ORAL at 09:09

## 2023-03-11 RX ADMIN — HYDRALAZINE HYDROCHLORIDE 100 MG: 50 TABLET, FILM COATED ORAL at 09:09

## 2023-03-11 RX ADMIN — HEPARIN SODIUM 1400 UNITS: 1000 INJECTION INTRAVENOUS; SUBCUTANEOUS at 00:28

## 2023-03-11 RX ADMIN — ASPIRIN 81 MG: 81 TABLET, CHEWABLE ORAL at 09:09

## 2023-03-11 RX ADMIN — TAMSULOSIN HYDROCHLORIDE 0.4 MG: 0.4 CAPSULE ORAL at 09:10

## 2023-03-11 NOTE — PROGRESS NOTES
Bedside shift change report given to Jazmyn Gomez RN (oncoming nurse) by Juli Escobedo RN (offgoing nurse). Report included the following information SBAR, Kardex, Intake/Output, MAR, and Recent Results.

## 2023-03-11 NOTE — PROGRESS NOTES
Problem: Risk for Spread of Infection  Goal: Prevent transmission of infectious organism to others  Description: Prevent the transmission of infectious organisms to other patients, staff members, and visitors. Outcome: Progressing Towards Goal     Problem: Patient Education:  Go to Education Activity  Goal: Patient/Family Education  Outcome: Progressing Towards Goal     Problem: Falls - Risk of  Goal: *Absence of Falls  Description: Document Evelyn Barlow Fall Risk and appropriate interventions in the flowsheet. Outcome: Progressing Towards Goal  Note: Fall Risk Interventions:            Medication Interventions: Bed/chair exit alarm                   Problem: Patient Education: Go to Patient Education Activity  Goal: Patient/Family Education  Outcome: Progressing Towards Goal     Problem: Diabetes Self-Management  Goal: *Disease process and treatment process  Description: Define diabetes and identify own type of diabetes; list 3 options for treating diabetes. Outcome: Progressing Towards Goal  Goal: *Incorporating nutritional management into lifestyle  Description: Describe effect of type, amount and timing of food on blood glucose; list 3 methods for planning meals. Outcome: Progressing Towards Goal  Goal: *Incorporating physical activity into lifestyle  Description: State effect of exercise on blood glucose levels. Outcome: Progressing Towards Goal  Goal: *Developing strategies to promote health/change behavior  Description: Define the ABC's of diabetes; identify appropriate screenings, schedule and personal plan for screenings. Outcome: Progressing Towards Goal  Goal: *Using medications safely  Description: State effect of diabetes medications on diabetes; name diabetes medication taking, action and side effects. Outcome: Progressing Towards Goal  Goal: *Monitoring blood glucose, interpreting and using results  Description: Identify recommended blood glucose targets  and personal targets.   Outcome: Progressing Towards Goal  Goal: *Prevention, detection, treatment of acute complications  Description: List symptoms of hyper- and hypoglycemia; describe how to treat low blood sugar and actions for lowering  high blood glucose level. Outcome: Progressing Towards Goal  Goal: *Prevention, detection and treatment of chronic complications  Description: Define the natural course of diabetes and describe the relationship of blood glucose levels to long term complications of diabetes.   Outcome: Progressing Towards Goal  Goal: *Developing strategies to address psychosocial issues  Description: Describe feelings about living with diabetes; identify support needed and support network  Outcome: Progressing Towards Goal  Goal: *Insulin pump training  Outcome: Progressing Towards Goal  Goal: *Sick day guidelines  Outcome: Progressing Towards Goal  Goal: *Patient Specific Goal (EDIT GOAL, INSERT TEXT)  Outcome: Progressing Towards Goal     Problem: Patient Education: Go to Patient Education Activity  Goal: Patient/Family Education  Outcome: Progressing Towards Goal     Problem: Infection - Risk of, Central Venous Catheter-Associated Bloodstream Infection  Goal: *Absence of infection signs and symptoms  Outcome: Progressing Towards Goal     Problem: Patient Education: Go to Patient Education Activity  Goal: Patient/Family Education  Outcome: Progressing Towards Goal

## 2023-03-11 NOTE — PROGRESS NOTES
Reportedly had trouble with graft last night  Has a good thrill and bruit this am(/)  Will ask them to try again on Monday

## 2023-03-11 NOTE — PROGRESS NOTES
Hospitalist Progress Note    NAME: Kortney Batista   :  1980   MRN:  [id number]       Assessment / Plan: Altered mental status secondary to severe hypoglycemia resolved   Uncontrolled diabetes mellitus with labile blood sugars  Thrombectomy LUE AV loop graft  Patient was lethargic on  as blood sugar was in 26, improved with D10 to 250 mL x 2  Was more alert oriented upon my evaluation  Initially was treated for DKA, now having hypoglycemias  Lantus reduced from 10 units to 5 units  Continue sliding scale insulin  : Sugar in the 400s, Lantus increased to 10 unit, added mealtime insulin 10 unit before each meal , continue sliding scale  Educator consulted  03/10:  blood Sugar controlled   patient underwent   Thrombectomy LUE AV loop graft                          Completion fistulagram w/fluoro                          Angioplasty/stenting venous outflow anastomosis (8x3)                          L arm arteriogram                          Angioplasty inflow anastomosis       : Hemodialysis note had trouble using the newly declotted  AVF graft  The graft has good thrill, vasc Recommended to try again on Monday  Blood sugar elevated as patient has been snacking, blood sugar in the 300  Will increase Lantus and add mealtime lispro    Code blue on 3/7  Became responsive after round of CPR  Most likely due to too much pain medication  Pain medication was adjusted  As per mother, pt had few similar episodes at home    Acute on chronic hypoxic respiratory failure, chronically on 2 L/M  S/s fluid overload in the setting of missing HD  Pulmonary edema and B/L pleural effusion, L>right  Nephrology consulted. HD as per nephrology  Continue oxygen and wean as tolerated      Right back Pain:  Likely s/s pleural effusion vs right pyelonephritis.   UA neg  renal/bladder US neg  Lower percocet dose to 2.5 mg, discontinue Toradol as it is contraindicated in hemodialysis patient  CT abd pelvis reviewed   IMPRESSION  1. Bilateral pleural effusions and lower lobe atelectasis. 2.  No evidence of nephrolithiasis or flank hematoma/injury  Hypertension:  Resume home medications- coreg, amlodipine, hydralazine, losartan      ESRD on HD  Nephrology consulted  HD as per nephrology  AVF clotted, Vascular consulted  New carlos cath inserted on 3/6     Suspected UTI  Started on ceftriaxone, follow-up urine cultures if able to obtain     Code Status: full code  Surrogate Decision Maker:mother   DVT Prophylaxis: sc heparin  18.5 - 24.9 Normal weight / Body mass index is 22.95 kg/m². Recommended Disposition: Home w/Family  Anticipated Discharge Date: 03/13  SHAE Barriers:  malfunction   AV fistula graft, uncontrolled blood sugars       Subjective: Follow-up uncontrolled diabetes mellitus  Patient reported malfunction of the AV fistula graft yesterday while getting dialysis  Blood sugar elevated as patient' had a sugary snack yesterday  Review of Systems:  Symptom Y/N Comments  Symptom Y/N Comments   Fever/Chills n   Chest Pain n    Poor Appetite    Edema     Cough    Abdominal Pain n    Sputum    Joint Pain     SOB/JOHNSTON n   Pruritis/Rash     Nausea/vomit n   Tolerating PT/OT     Diarrhea    Tolerating Diet y    Constipation    Other       PO intake: No data found. Wt Readings from Last 10 Encounters:   03/11/23 70.5 kg (155 lb 6.8 oz)   01/10/23 85.3 kg (188 lb)   12/29/22 78 kg (171 lb 15.3 oz)   12/27/22 78 kg (171 lb 15.3 oz)   12/21/22 78 kg (171 lb 15.3 oz)   12/06/22 80.6 kg (177 lb 11.1 oz)   11/02/22 71.8 kg (158 lb 6.4 oz)   10/25/22 69.2 kg (152 lb 8.9 oz)   09/28/22 75.8 kg (167 lb 3.2 oz)   09/13/22 72.1 kg (159 lb)       Objective:     VITALS:   Last 24hrs VS reviewed since prior progress note.  Most recent are:  Patient Vitals for the past 24 hrs:   Temp Pulse Resp BP SpO2   03/11/23 1144 98.2 °F (36.8 °C) 81 18 (!) 141/77 93 %   03/11/23 0758 98.3 °F (36.8 °C) 87 18 (!) 171/58 93 %   03/11/23 0424 98 °F (36.7 °C) 86 16 (!) 142/72 (!) 89 %   03/11/23 0030 98 °F (36.7 °C) 74 -- 107/67 97 %   03/11/23 0015 -- 73 -- 102/67 97 %   03/11/23 0000 98.1 °F (36.7 °C) 72 15 107/66 97 %   03/10/23 2345 -- 72 -- (!) 105/58 96 %   03/10/23 2330 -- 73 -- (!) 96/57 95 %   03/10/23 2315 -- 70 -- 113/68 95 %   03/10/23 2300 -- 71 -- (!) 109/58 94 %   03/10/23 2245 -- 70 -- 136/69 --   03/10/23 2230 -- 71 -- (!) 155/80 --   03/10/23 2215 -- 70 -- 135/72 --   03/10/23 2200 -- 69 -- (!) 100/58 91 %   03/10/23 2145 -- 67 -- 110/60 90 %   03/10/23 2130 -- 67 -- 116/73 94 %   03/10/23 2115 -- 67 -- 99/74 94 %   03/10/23 2100 -- 66 -- 127/72 93 %   03/10/23 2045 -- 66 -- 122/76 93 %   03/10/23 2030 -- 66 -- 131/70 93 %   03/10/23 2027 98.1 °F (36.7 °C) -- 16 131/70 94 %   03/10/23 2011 98 °F (36.7 °C) 65 17 118/68 91 %   03/10/23 1601 98.1 °F (36.7 °C) 70 16 132/78 (!) 87 %   03/10/23 1515 97.9 °F (36.6 °C) 69 12 126/68 95 %   03/10/23 1500 -- 69 17 126/70 95 %   03/10/23 1455 -- 70 16 126/70 95 %   03/10/23 1445 -- 69 17 122/62 95 %   03/10/23 1435 -- 67 15 117/61 93 %   03/10/23 1430 -- 67 20 115/69 94 %   03/10/23 1425 -- 67 12 114/63 94 %   03/10/23 1420 -- 66 10 116/64 98 %   03/10/23 1415 -- 65 9 113/63 98 %   03/10/23 1410 (!) 96.6 °F (35.9 °C) 65 12 (!) 106/59 98 %         Intake/Output Summary (Last 24 hours) at 3/11/2023 1317  Last data filed at 3/11/2023 0030  Gross per 24 hour   Intake 500 ml   Output 3500 ml   Net -3000 ml          I had a face to face encounter, and independently examined this patient as outlined below:    PHYSICAL EXAM:  General:    No distress     HEENT: Atraumatic, anicteric sclerae, pink conjunctivae, MMM  Neck:  Supple, symmetrical  Lungs:   CTA. No Wheezing/Rhonchi. No rales. No tenderness. No Accessory muscle use. CVS:   Regular rhythm. No murmur. No JVD. L UE AVF w + thrill   GI/:   Soft.  NT. ND. BS normal. Mild R CVAT  Extremities: No edema. No cyanosis. No clubbing. Skin:     Not pale. Not Jaundiced. No rashes   Psych:  Good insight. Not depressed. Not anxious or agitated. Neurologic: Alert and oriented X 4. EOMs intact. No facial asymmetry. No slurred speech. Symmetrical strength, Sensation grossly intact. Labs     I reviewed today's most current labs and imaging studies. Pertinent labs include:  Recent Labs     03/10/23  2259   WBC 5.6   HGB 11.2*   HCT 36.4*          Recent Labs     03/10/23  0952   *   K 3.8      CO2 22   *   BUN 47*   CREA 5.71*   CA 7.9*       CT ABD PELV W CONT    Result Date: 3/7/2023  1. Bilateral pleural effusions and lower lobe atelectasis. 2.  No evidence of nephrolithiasis or flank hematoma/injury. US RETROPERITONEUM LTD    Result Date: 3/4/2023  Unremarkable Renal Sonogram.     XR CHEST PORT    Result Date: 3/4/2023  Pulmonary edema with moderate left and small right pleural effusions, and associated bibasilar airspace disease. IR INSERT NON TUNL CVC OVER 5 YRS    Result Date: 3/6/2023  1. Successful placement of right internal jugular temporary dialysis catheter. Catheter ready for immediate use. No results found. 10/16/22    ECHO ADULT COMPLETE 10/27/2022 10/27/2022    Interpretation Summary    Left Ventricle: Normal left ventricular systolic function with a visually estimated EF of 45 - 50%. Left ventricle size is normal. Normal wall thickness. Normal wall motion. Aortic Valve: Tricuspid valve.     Signed by: Ralph Kruse MD on 10/27/2022  8:23 AM     All Micro Results       None              Current Medications:     Current Facility-Administered Medications:     [START ON 3/12/2023] insulin glargine (LANTUS) injection 15 Units, 15 Units, SubCUTAneous, DAILY, Alex Palacio MD    alcohol 62% (NOZIN) nasal  1 Ampule, 1 Ampule, Topical, Q12H, Sarah Melo MD, 1 Ampule at 03/11/23 0912    oxyCODONE IR (ROXICODONE) tablet 2.5 mg, 2.5 mg, Oral, Q6H PRN, Itz Hanley MD, 2.5 mg at 03/11/23 0908    heparin (porcine) 1,000 unit/mL injection 1,400 Units, 1,400 Units, InterCATHeter, DIALYSIS PRN, 1,400 Units at 03/11/23 0028 **AND** heparin (porcine) 1,000 unit/mL injection 1,100 Units, 1,100 Units, InterCATHeter, DIALYSIS PRN, Itz Hanley MD, 1,100 Units at 03/11/23 0027    albumin human 25% (BUMINATE) solution 12.5 g, 12.5 g, IntraVENous, DIALYSIS PRN, Itz Hanley MD    heparin (porcine) 1,000 unit/mL injection 2,500 Units, 2,500 Units, Hemodialysis, DIALYSIS PRN, Itz Hanley MD, 2,500 Units at 03/07/23 0313    sodium chloride (NS) flush 5-40 mL, 5-40 mL, IntraVENous, Q8H, Itz Hanley MD, 10 mL at 03/11/23 0615    sodium chloride (NS) flush 5-40 mL, 5-40 mL, IntraVENous, PRN, Itz Hanley MD    insulin lispro (HUMALOG) injection, , SubCUTAneous, AC&HS, Itz Hanley MD, 3 Units at 03/10/23 2237    glucose chewable tablet 16 g, 4 Tablet, Oral, PRN, Itz Hanley MD    glucagon (GLUCAGEN) injection 1 mg, 1 mg, IntraMUSCular, PRN, Itz Hanley MD    dextrose 10% infusion 0-250 mL, 0-250 mL, IntraVENous, PRN, Itz Hanley MD, 250 mL at 03/08/23 0744    cloNIDine HCL (CATAPRES) tablet 0.1 mg, 0.1 mg, Oral, Q6H PRN, Itz Hanley MD, 0.1 mg at 03/10/23 0401    acetaminophen (TYLENOL) tablet 650 mg, 650 mg, Oral, Q6H PRN, 650 mg at 03/07/23 2129 **OR** acetaminophen (TYLENOL) suppository 650 mg, 650 mg, Rectal, Q6H PRN, Itz Hanley MD    polyethylene glycol (MIRALAX) packet 17 g, 17 g, Oral, DAILY PRN, Itz Hanley MD    ondansetron (ZOFRAN ODT) tablet 4 mg, 4 mg, Oral, Q8H PRN **OR** ondansetron (ZOFRAN) injection 4 mg, 4 mg, IntraVENous, Q6H PRN, Itz Hanley MD    amLODIPine (NORVASC) tablet 10 mg, 10 mg, Oral, DAILY, Itz Hanley MD, 10 mg at 03/11/23 6414    aspirin chewable tablet 81 mg, 81 mg, Oral, DAILY, Itz Hanley MD, 81 mg at 03/11/23 0909    carvediloL (COREG) tablet 12.5 mg, 12.5 mg, Oral, BID WITH MEALS, Gurney Osler, MD, 12.5 mg at 03/11/23 6183    cloNIDine (CATAPRES) 0.1 mg/24 hr patch 1 Patch, 1 Patch, TransDERmal, Q7D, Gurney Osler, MD    DULoxetine (CYMBALTA) capsule 60 mg, 60 mg, Oral, DAILY, Gurney Osler, MD, 60 mg at 03/11/23 0909    finasteride (PROSCAR) tablet 5 mg, 5 mg, Oral, DAILY, Gurney Osler, MD, 5 mg at 03/11/23 4365    hydrALAZINE (APRESOLINE) tablet 100 mg, 100 mg, Oral, TID, Gurney Osler, MD, 100 mg at 03/11/23 0909    losartan (COZAAR) tablet 50 mg, 50 mg, Oral, DAILY, Gurney Osler, MD, 50 mg at 03/11/23 0909    pantoprazole (PROTONIX) tablet 40 mg, 40 mg, Oral, DAILY, Gurney Osler, MD, 40 mg at 03/11/23 0909    pregabalin (LYRICA) capsule 75 mg, 75 mg, Oral, QHS, Gurney Osler, MD, 75 mg at 03/10/23 2237    rosuvastatin (CRESTOR) tablet 40 mg, 40 mg, Oral, QHS, Gurney Osler, MD, 40 mg at 03/10/23 2237    sevelamer carbonate (RENVELA) tab 1,600 mg, 1,600 mg, Oral, TID WITH MEALS, Gurney Osler, MD, 1,600 mg at 03/11/23 1131    sucralfate (CARAFATE) tablet 1 g, 1 g, Oral, AC&HS, Gurney Osler, MD, 1 g at 03/11/23 1131    tamsulosin (FLOMAX) capsule 0.4 mg, 0.4 mg, Oral, DAILY, Gurney Osler, MD, 0.4 mg at 03/11/23 0910    heparin (porcine) injection 5,000 Units, 5,000 Units, SubCUTAneous, Q12H, Gurney Osler, MD, 5,000 Units at 03/11/23 1311    furosemide (LASIX) tablet 40 mg, 40 mg, Oral, BID, Gurney Osler, MD, 40 mg at 03/11/23 8002     Procedures: see electronic medical records for all procedures/Xrays and details which were not copied into this note but were reviewed prior to creation of Plan.     Reviewed most current lab test results and cultures  YES  Reviewed most current radiology test results   YES  Review and summation of old records today    NO  Reviewed patient's current orders and MAR    YES  PMH/SH reviewed - no change compared to H&P  ________________________________________________________________________  Care Plan discussed with:    Comments   Patient x    Family  x    RN x    Care Manager x    Consultant                       x Multidiciplinary team rounds were held today with , nursing, pharmacist and clinical coordinator. Patient's plan of care was discussed; medications were reviewed and discharge planning was addressed.      ________________________________________________________________________  Total NON critical care TIME:   25Minutes    Total CRITICAL CARE TIME Spent:Minutes non procedure based      Comments   >50% of visit spent in counseling and coordination of care x     This includes time during multidisciplinary rounds if indicated above   ________________________________________________________________________  Thao Martinez MD

## 2023-03-11 NOTE — PROCEDURES
Hemodialysis / [phone]    Vitals Pre Post Assessment Pre Post   BP BP: 131/70 (03/10/23 2027) 107/67 LOC A&Ox4 A&Ox4   HR Pulse (Heart Rate): 70 (03/10/23 1601) 74 Lungs Diminished bilaterally Diminished bilaterally   Resp Resp Rate: 16 (03/10/23 2027) 18 Cardiac Regular, S1, S2 Regular, S1, S2   Temp Temp: 98.1 °F (36.7 °C) (03/10/23 2027) 98.1 Skin LUE AVG s/p stent placement LUE AVG, s/p stent placement   Weight    Edema Trace  Trace    Tele status NSR NSR Pain Pain Intensity 1: 0 (03/10/23 1601) 0     Orders   Duration: Start: 2027 End: 0027 Total: 3.5 hours   Dialyzer: Dialyzer/Set Up Inspection: Maximiliano Holt (03/10/23 2027)   K Bath: Dialysate K (mEq/L): 3 (03/10/23 2027)   Ca Bath: Dialysate CA (mEq/L): 2.5 (03/10/23 2027)   Na: Dialysate NA (mEq/L): 138 (03/10/23 2027)   Bicarb: Dialysate HCO3 (mEq/L): 35 (03/10/23 2027)   Target Fluid Removal: Goal/Amount of Fluid to Remove (mL): 3500 mL (03/10/23 2027)     Access   Type & Location: LUE AVG: Pre-assessment: skin CDI. No s/s of infection. + B/T. No issues with cannulation. Running well at -400. Discontinuation of AVG at 2200, due to elevated venous pressure and treatment alarming, venous site clotted. Did not see an appropriate site to re-cannulate venous site post clotting. Post assessment: No issues with hemostasis, + B/T remains. Dressing CDI. R IJ CVC Francis, accessed per p&p for remainder of treatment time, site and dressing CDI, dressing dated 03/07/23.      Comments:                                        Labs   HBsAg (Antigen) / date: 02/15/23 Negative                                           HBsAb (Antibody) / date: 09/14/22 Susceptible    Source: Physician Portal    Obtained/Reviewed  Critical Results Called HGB   Date Value Ref Range Status   03/08/2023 12.0 (L) 12.1 - 17.0 g/dL Final     Potassium   Date Value Ref Range Status   03/10/2023 3.8 3.5 - 5.1 mmol/L Final     Calcium   Date Value Ref Range Status   03/10/2023 7.9 (L) 8.5 - 10.1 MG/DL Final     BUN   Date Value Ref Range Status   03/10/2023 47 (H) 6 - 20 MG/DL Final     Comment:     INVESTIGATED PER DELTA CHECK PROTOCOL     Creatinine   Date Value Ref Range Status   03/10/2023 5.71 (H) 0.70 - 1.30 MG/DL Final     Comment:     INVESTIGATED PER DELTA CHECK PROTOCOL        Meds Given   Name Dose Route   Heparin 1:1000 1400/1100 per lumen IV hep lock               Adequacy / Fluid    Total Liters Process: 70.8 L   Net Fluid Removed: 3500 mL      Comments   Time Out Done:   (Time) @ 2015   Admitting Diagnosis: Acute on chronic hypoxic respiratory failure  S/s fluid overload in the setting of missing HD   Consent obtained/signed: Informed Consent Verified: Yes (03/10/23 2027)   Machine / Sergio Og # Machine Number: [id number] (03/10/23 2027)   Primary Nurse Rpt Pre: Marisel Ingram RN   Primary Nurse Rpt Post: Milo Enciso RN   Pt Education: Ordered HD treatment   Care Plan: CKD   Pts outpatient clinic: [facility] at [address]     Tx Summary   Comments:           SBAR received from Primary RN. Pt A&Ox4. Chronic consent applies. 2027: Pt cannulated with 66X needles per policy & without issue. VSS. Dialysis Tx initiated. 2040: Lines visible, patent and intact. 2140: Venous pressure increasing to 300, BFR decreased to 350, pressures returned to within normal limits. Vital signs stable. 2200: Patient requesting to use bedpan, pt assisted to use bedpan, Machine alarming, Venous pressure elevated, lines flushed, venous line difficult to flush, aspiration sluggish, appears clotted. Treatment paused and circuit blood placed in re-circulation while patient used bedpan. Blood clotted in circuit while patient was using bedpan so unable to return blood, new setup and prime performed. Did not see an appropriate venous site that would provide good venous flow post clotting, therefore R IJ CVC was cleaned and accessed per p&p and treatment restarted using CVC.  LUE AVG access was de-cannulated without difficulty and hemostasis achieved. + bruit/thrill. 2300: Vital signs stable, lines patent and intact, pt resting quietly. 7205: Tx ended. VSS. Each dialysis catheter limb disinfected per p&p, all possible blood returned per p&p, and each dialysis hub disinfected per p&p. Each lumen flushed, post dialysis catheter Heparin dwell instilled per order, and caps applied. Bed locked and in the lowest position, call bell and belongings in reach. SBAR given to Primary, RN. Patient is stable at time of their/ my departure. All Dialysis related medications have been reviewed.

## 2023-03-11 NOTE — PROGRESS NOTES
Bedside shift change report given to Miguel Brown RN (oncoming nurse) by Kalyani Parrish RN (offgoing nurse). Report included the following information SBAR, Kardex, ED Summary, Intake/Output, MAR, and Recent Results.

## 2023-03-12 LAB
GLUCOSE BLD STRIP.AUTO-MCNC: 117 MG/DL (ref 65–117)
GLUCOSE BLD STRIP.AUTO-MCNC: 119 MG/DL (ref 65–117)
GLUCOSE BLD STRIP.AUTO-MCNC: 371 MG/DL (ref 65–117)
GLUCOSE BLD STRIP.AUTO-MCNC: 434 MG/DL (ref 65–117)
GLUCOSE BLD STRIP.AUTO-MCNC: 446 MG/DL (ref 65–117)
SERVICE CMNT-IMP: ABNORMAL
SERVICE CMNT-IMP: NORMAL

## 2023-03-12 PROCEDURE — 74011636637 HC RX REV CODE- 636/637: Performed by: INTERNAL MEDICINE

## 2023-03-12 PROCEDURE — 74011000250 HC RX REV CODE- 250: Performed by: SURGERY

## 2023-03-12 PROCEDURE — 77010033678 HC OXYGEN DAILY

## 2023-03-12 PROCEDURE — 74011250637 HC RX REV CODE- 250/637: Performed by: SURGERY

## 2023-03-12 PROCEDURE — 82962 GLUCOSE BLOOD TEST: CPT

## 2023-03-12 PROCEDURE — 74011636637 HC RX REV CODE- 636/637: Performed by: SURGERY

## 2023-03-12 PROCEDURE — 74011250636 HC RX REV CODE- 250/636: Performed by: SURGERY

## 2023-03-12 PROCEDURE — 65270000046 HC RM TELEMETRY

## 2023-03-12 RX ORDER — INSULIN LISPRO 100 [IU]/ML
10 INJECTION, SOLUTION INTRAVENOUS; SUBCUTANEOUS
Status: DISCONTINUED | OUTPATIENT
Start: 2023-03-12 | End: 2023-03-13 | Stop reason: HOSPADM

## 2023-03-12 RX ADMIN — OXYCODONE HYDROCHLORIDE 2.5 MG: 5 TABLET ORAL at 06:03

## 2023-03-12 RX ADMIN — AMLODIPINE BESYLATE 10 MG: 5 TABLET ORAL at 08:11

## 2023-03-12 RX ADMIN — PANTOPRAZOLE SODIUM 40 MG: 40 TABLET, DELAYED RELEASE ORAL at 08:11

## 2023-03-12 RX ADMIN — HYDRALAZINE HYDROCHLORIDE 100 MG: 50 TABLET, FILM COATED ORAL at 08:11

## 2023-03-12 RX ADMIN — SEVELAMER CARBONATE 1600 MG: 800 TABLET, FILM COATED ORAL at 08:11

## 2023-03-12 RX ADMIN — FUROSEMIDE 40 MG: 40 TABLET ORAL at 08:12

## 2023-03-12 RX ADMIN — SUCRALFATE 1 G: 1 TABLET ORAL at 16:23

## 2023-03-12 RX ADMIN — Medication 1 AMPULE: at 21:17

## 2023-03-12 RX ADMIN — HYDRALAZINE HYDROCHLORIDE 100 MG: 50 TABLET, FILM COATED ORAL at 21:20

## 2023-03-12 RX ADMIN — SODIUM CHLORIDE, PRESERVATIVE FREE 10 ML: 5 INJECTION INTRAVENOUS at 06:03

## 2023-03-12 RX ADMIN — SEVELAMER CARBONATE 1600 MG: 800 TABLET, FILM COATED ORAL at 16:23

## 2023-03-12 RX ADMIN — SODIUM CHLORIDE, PRESERVATIVE FREE 10 ML: 5 INJECTION INTRAVENOUS at 14:48

## 2023-03-12 RX ADMIN — SUCRALFATE 1 G: 1 TABLET ORAL at 08:11

## 2023-03-12 RX ADMIN — SUCRALFATE 1 G: 1 TABLET ORAL at 21:20

## 2023-03-12 RX ADMIN — CARVEDILOL 12.5 MG: 12.5 TABLET, FILM COATED ORAL at 08:11

## 2023-03-12 RX ADMIN — PREGABALIN 75 MG: 50 CAPSULE ORAL at 21:19

## 2023-03-12 RX ADMIN — INSULIN GLARGINE 15 UNITS: 100 INJECTION, SOLUTION SUBCUTANEOUS at 08:19

## 2023-03-12 RX ADMIN — Medication 5 UNITS: at 08:19

## 2023-03-12 RX ADMIN — Medication 1 AMPULE: at 08:11

## 2023-03-12 RX ADMIN — TAMSULOSIN HYDROCHLORIDE 0.4 MG: 0.4 CAPSULE ORAL at 08:11

## 2023-03-12 RX ADMIN — FUROSEMIDE 40 MG: 40 TABLET ORAL at 18:25

## 2023-03-12 RX ADMIN — OXYCODONE HYDROCHLORIDE 2.5 MG: 5 TABLET ORAL at 18:25

## 2023-03-12 RX ADMIN — FINASTERIDE 5 MG: 5 TABLET, FILM COATED ORAL at 08:12

## 2023-03-12 RX ADMIN — SODIUM CHLORIDE, PRESERVATIVE FREE 10 ML: 5 INJECTION INTRAVENOUS at 21:19

## 2023-03-12 RX ADMIN — HEPARIN SODIUM 5000 UNITS: 5000 INJECTION INTRAVENOUS; SUBCUTANEOUS at 08:11

## 2023-03-12 RX ADMIN — SUCRALFATE 1 G: 1 TABLET ORAL at 11:30

## 2023-03-12 RX ADMIN — HYDRALAZINE HYDROCHLORIDE 100 MG: 50 TABLET, FILM COATED ORAL at 16:23

## 2023-03-12 RX ADMIN — Medication 10 UNITS: at 11:56

## 2023-03-12 RX ADMIN — SEVELAMER CARBONATE 1600 MG: 800 TABLET, FILM COATED ORAL at 11:55

## 2023-03-12 RX ADMIN — CARVEDILOL 12.5 MG: 12.5 TABLET, FILM COATED ORAL at 16:23

## 2023-03-12 RX ADMIN — LOSARTAN POTASSIUM 50 MG: 50 TABLET, FILM COATED ORAL at 08:11

## 2023-03-12 RX ADMIN — DULOXETINE 60 MG: 30 CAPSULE, DELAYED RELEASE ORAL at 08:11

## 2023-03-12 RX ADMIN — OXYCODONE HYDROCHLORIDE 2.5 MG: 5 TABLET ORAL at 11:55

## 2023-03-12 RX ADMIN — ASPIRIN 81 MG: 81 TABLET, CHEWABLE ORAL at 08:11

## 2023-03-12 RX ADMIN — ROSUVASTATIN CALCIUM 40 MG: 40 TABLET, FILM COATED ORAL at 21:20

## 2023-03-12 RX ADMIN — HEPARIN SODIUM 5000 UNITS: 5000 INJECTION INTRAVENOUS; SUBCUTANEOUS at 21:19

## 2023-03-12 NOTE — PROGRESS NOTES
Problem: Risk for Spread of Infection  Goal: Prevent transmission of infectious organism to others  Description: Prevent the transmission of infectious organisms to other patients, staff members, and visitors. Outcome: Progressing Towards Goal     Problem: Patient Education:  Go to Education Activity  Goal: Patient/Family Education  Outcome: Progressing Towards Goal     Problem: Falls - Risk of  Goal: *Absence of Falls  Description: Document Lali Comer Fall Risk and appropriate interventions in the flowsheet. Outcome: Progressing Towards Goal  Note: Fall Risk Interventions:            Medication Interventions: Bed/chair exit alarm                   Problem: Patient Education: Go to Patient Education Activity  Goal: Patient/Family Education  Outcome: Progressing Towards Goal     Problem: Diabetes Self-Management  Goal: *Disease process and treatment process  Description: Define diabetes and identify own type of diabetes; list 3 options for treating diabetes. Outcome: Progressing Towards Goal  Goal: *Incorporating nutritional management into lifestyle  Description: Describe effect of type, amount and timing of food on blood glucose; list 3 methods for planning meals. Outcome: Progressing Towards Goal  Goal: *Incorporating physical activity into lifestyle  Description: State effect of exercise on blood glucose levels. Outcome: Progressing Towards Goal  Goal: *Developing strategies to promote health/change behavior  Description: Define the ABC's of diabetes; identify appropriate screenings, schedule and personal plan for screenings. Outcome: Progressing Towards Goal  Goal: *Using medications safely  Description: State effect of diabetes medications on diabetes; name diabetes medication taking, action and side effects. Outcome: Progressing Towards Goal  Goal: *Monitoring blood glucose, interpreting and using results  Description: Identify recommended blood glucose targets  and personal targets.   Outcome: Progressing Towards Goal  Goal: *Prevention, detection, treatment of acute complications  Description: List symptoms of hyper- and hypoglycemia; describe how to treat low blood sugar and actions for lowering  high blood glucose level. Outcome: Progressing Towards Goal  Goal: *Prevention, detection and treatment of chronic complications  Description: Define the natural course of diabetes and describe the relationship of blood glucose levels to long term complications of diabetes.   Outcome: Progressing Towards Goal  Goal: *Developing strategies to address psychosocial issues  Description: Describe feelings about living with diabetes; identify support needed and support network  Outcome: Progressing Towards Goal  Goal: *Insulin pump training  Outcome: Progressing Towards Goal  Goal: *Sick day guidelines  Outcome: Progressing Towards Goal  Goal: *Patient Specific Goal (EDIT GOAL, INSERT TEXT)  Outcome: Progressing Towards Goal     Problem: Patient Education: Go to Patient Education Activity  Goal: Patient/Family Education  Outcome: Progressing Towards Goal     Problem: Infection - Risk of, Central Venous Catheter-Associated Bloodstream Infection  Goal: *Absence of infection signs and symptoms  Outcome: Progressing Towards Goal     Problem: Patient Education: Go to Patient Education Activity  Goal: Patient/Family Education  Outcome: Progressing Towards Goal

## 2023-03-12 NOTE — PROGRESS NOTES
Hospitalist Progress Note    NAME: Roderick Padgett   :  1980   MRN:  [id number]       Assessment / Plan: Altered mental status secondary to severe hypoglycemia resolved   Uncontrolled diabetes mellitus with labile blood sugars  Thrombectomy LUE AV loop graft  Patient was lethargic on  as blood sugar was in 26, improved with D10 to 250 mL x 2  Was more alert oriented upon my evaluation  Initially was treated for DKA, now having hypoglycemias  Lantus reduced from 10 units to 5 units  Continue sliding scale insulin  : Sugar in the 400s, Lantus increased to 10 unit, added mealtime insulin 10 unit before each meal , continue sliding scale  Educator consulted  03/10:  blood Sugar controlled   patient underwent   Thrombectomy LUE AV loop graft                          Completion fistulagram w/fluoro                          Angioplasty/stenting venous outflow anastomosis (8x3)                          L arm arteriogram                          Angioplasty inflow anastomosis       : Hemodialysis note had trouble using the newly declotted  AVF graft  The graft has good thrill, vasc Recommended to try again on Monday  Blood sugar elevated as patient has been snacking, blood sugar in the 300  Will increase Lantus and add mealtime lispro    : Blood sugar remains elevated around 400, adjusted insulin regiment, increase mealtime insulin    Code blue on 3/7  Became responsive after round of CPR  Most likely due to too much pain medication  Pain medication was adjusted  As per mother, pt had few similar episodes at home    Acute on chronic hypoxic respiratory failure, chronically on 2 L/M  S/s fluid overload in the setting of missing HD  Pulmonary edema and B/L pleural effusion, L>right  Nephrology consulted.   HD as per nephrology  Continue oxygen and wean as tolerated      Right back Pain:  Likely s/s pleural effusion vs right pyelonephritis. UA neg  renal/bladder US neg  Lower percocet dose to 2.5 mg, discontinue Toradol as it is contraindicated in hemodialysis patient  CT abd pelvis reviewed   IMPRESSION  1. Bilateral pleural effusions and lower lobe atelectasis. 2.  No evidence of nephrolithiasis or flank hematoma/injury  Hypertension:  Resume home medications- coreg, amlodipine, hydralazine, losartan      ESRD on HD  Nephrology consulted  HD as per nephrology  AVF clotted, Vascular consulted  New carlos cath inserted on 3/6  AV fistula declotted on 03/10, HD nurse unable to use it on Friday, plan is to try using it on the next HD session which will be on Monday     Suspected UTI  Status post ceftriaxone     Code Status: full code  Surrogate Decision Maker:mother   DVT Prophylaxis: sc heparin  18.5 - 24.9 Normal weight / Body mass index is 23.93 kg/m². Recommended Disposition: Home w/Family  Anticipated Discharge Date: 03/13  SHAE Barriers:  malfunction   AV fistula graft, uncontrolled blood sugars       Subjective: Follow-up uncontrolled diabetes mellitus  No acute complaints, blood sugar became elevated  Review of Systems:  Symptom Y/N Comments  Symptom Y/N Comments   Fever/Chills n   Chest Pain n    Poor Appetite    Edema     Cough    Abdominal Pain n    Sputum    Joint Pain     SOB/JOHNSTON n   Pruritis/Rash     Nausea/vomit n   Tolerating PT/OT     Diarrhea    Tolerating Diet y    Constipation    Other       PO intake: No data found. Wt Readings from Last 10 Encounters:   03/12/23 73.5 kg (162 lb 0.6 oz)   01/10/23 85.3 kg (188 lb)   12/29/22 78 kg (171 lb 15.3 oz)   12/27/22 78 kg (171 lb 15.3 oz)   12/21/22 78 kg (171 lb 15.3 oz)   12/06/22 80.6 kg (177 lb 11.1 oz)   11/02/22 71.8 kg (158 lb 6.4 oz)   10/25/22 69.2 kg (152 lb 8.9 oz)   09/28/22 75.8 kg (167 lb 3.2 oz)   09/13/22 72.1 kg (159 lb)       Objective:     VITALS:   Last 24hrs VS reviewed since prior progress note.  Most recent are:  Patient Vitals for the past 24 hrs:   Temp Pulse Resp BP SpO2   03/12/23 1120 98.1 °F (36.7 °C) 79 18 (!) 162/78 (!) 89 %   03/12/23 0733 97.8 °F (36.6 °C) 86 18 (!) 180/89 92 %   03/12/23 0621 -- 82 -- (!) 158/72 91 %   03/12/23 0539 98.3 °F (36.8 °C) 83 18 (!) 157/61 92 %   03/11/23 2258 98.3 °F (36.8 °C) 76 16 (!) 144/76 92 %   03/11/23 1928 98.5 °F (36.9 °C) 75 18 109/64 91 %   03/11/23 1512 -- 81 -- 112/69 91 %       No intake or output data in the 24 hours ending 03/12/23 1414       I had a face to face encounter, and independently examined this patient as outlined below:    PHYSICAL EXAM:  General:    No distress     HEENT: Atraumatic, anicteric sclerae, pink conjunctivae, MMM  Neck:  Supple, symmetrical  Lungs:   CTA. No Wheezing/Rhonchi. No rales. No tenderness. No Accessory muscle use. CVS:   Regular rhythm. No murmur. No JVD. L UE AVF w + thrill   GI/:   Soft. NT. ND. BS normal. Mild R CVAT  Extremities: No edema. No cyanosis. No clubbing. Skin:     Not pale. Not Jaundiced. No rashes   Psych:  Good insight. Not depressed. Not anxious or agitated. Neurologic: Alert and oriented X 4. EOMs intact. No facial asymmetry. No slurred speech. Symmetrical strength, Sensation grossly intact. Labs     I reviewed today's most current labs and imaging studies. Pertinent labs include:  Recent Labs     03/10/23  2259   WBC 5.6   HGB 11.2*   HCT 36.4*          Recent Labs     03/10/23  0952   *   K 3.8      CO2 22   *   BUN 47*   CREA 5.71*   CA 7.9*       CT ABD PELV W CONT    Result Date: 3/7/2023  1. Bilateral pleural effusions and lower lobe atelectasis. 2.  No evidence of nephrolithiasis or flank hematoma/injury. US RETROPERITONEUM LTD    Result Date: 3/4/2023  Unremarkable Renal Sonogram.     XR CHEST PORT    Result Date: 3/4/2023  Pulmonary edema with moderate left and small right pleural effusions, and associated bibasilar airspace disease.     IR INSERT NON TUNL CVC OVER 5 YRS    Result Date: 3/6/2023  1. Successful placement of right internal jugular temporary dialysis catheter. Catheter ready for immediate use. No results found. 10/16/22    ECHO ADULT COMPLETE 10/27/2022 10/27/2022    Interpretation Summary    Left Ventricle: Normal left ventricular systolic function with a visually estimated EF of 45 - 50%. Left ventricle size is normal. Normal wall thickness. Normal wall motion. Aortic Valve: Tricuspid valve.     Signed by: Mckayla Mehta MD on 10/27/2022  8:23 AM     All Micro Results       None              Current Medications:     Current Facility-Administered Medications:     insulin lispro (HUMALOG) injection 10 Units, 10 Units, SubCUTAneous, TID WITH MEALS, George Ng MD, 10 Units at 03/12/23 1156    insulin glargine (LANTUS) injection 15 Units, 15 Units, SubCUTAneous, DAILY, George Ng MD, 15 Units at 03/12/23 0819    alcohol 62% (NOZIN) nasal  1 Ampule, 1 Ampule, Topical, Q12H, Kamla Adams MD, 1 Ampule at 03/12/23 0811    oxyCODONE IR (ROXICODONE) tablet 2.5 mg, 2.5 mg, Oral, Q6H PRN, Kamla Adams MD, 2.5 mg at 03/12/23 1155    heparin (porcine) 1,000 unit/mL injection 1,400 Units, 1,400 Units, InterCATHeter, DIALYSIS PRN, 1,400 Units at 03/11/23 0028 **AND** heparin (porcine) 1,000 unit/mL injection 1,100 Units, 1,100 Units, InterCATHeter, DIALYSIS PRN, Kamla Adams MD, 1,100 Units at 03/11/23 0027    albumin human 25% (BUMINATE) solution 12.5 g, 12.5 g, IntraVENous, DIALYSIS PRN, Kamla Adams MD    heparin (porcine) 1,000 unit/mL injection 2,500 Units, 2,500 Units, Hemodialysis, DIALYSIS PRN, Kamla Adams MD, 2,500 Units at 03/07/23 0313    sodium chloride (NS) flush 5-40 mL, 5-40 mL, IntraVENous, Q8H, Kamla Adams MD, 10 mL at 03/12/23 0603    sodium chloride (NS) flush 5-40 mL, 5-40 mL, IntraVENous, PRN, Kamla Adams MD    insulin lispro (HUMALOG) injection, , SubCUTAneous, AC&HS, Marlon Holguin MD EDDIE, 10 Units at 03/12/23 1156    glucose chewable tablet 16 g, 4 Tablet, Oral, PRN, Gurney Osler, MD    glucagon (GLUCAGEN) injection 1 mg, 1 mg, IntraMUSCular, PRN, Gurney Osler, MD    dextrose 10% infusion 0-250 mL, 0-250 mL, IntraVENous, PRN, Gurney Osler, MD, 250 mL at 03/08/23 0744    cloNIDine HCL (CATAPRES) tablet 0.1 mg, 0.1 mg, Oral, Q6H PRN, Gurney Osler, MD, 0.1 mg at 03/10/23 0401    acetaminophen (TYLENOL) tablet 650 mg, 650 mg, Oral, Q6H PRN, 650 mg at 03/07/23 2129 **OR** acetaminophen (TYLENOL) suppository 650 mg, 650 mg, Rectal, Q6H PRN, Gurney Osler, MD    polyethylene glycol (MIRALAX) packet 17 g, 17 g, Oral, DAILY PRN, Gurney Osler, MD    ondansetron (ZOFRAN ODT) tablet 4 mg, 4 mg, Oral, Q8H PRN **OR** ondansetron (ZOFRAN) injection 4 mg, 4 mg, IntraVENous, Q6H PRN, Gurney Osler, MD    amLODIPine (NORVASC) tablet 10 mg, 10 mg, Oral, DAILY, Gurney Osler, MD, 10 mg at 03/12/23 9673    aspirin chewable tablet 81 mg, 81 mg, Oral, DAILY, Gurney Osler, MD, 81 mg at 03/12/23 0811    carvediloL (COREG) tablet 12.5 mg, 12.5 mg, Oral, BID WITH MEALS, Gurney Osler, MD, 12.5 mg at 03/12/23 3685    cloNIDine (CATAPRES) 0.1 mg/24 hr patch 1 Patch, 1 Patch, TransDERmal, Q7D, Gurney Osler, MD, 1 Patch at 03/11/23 1508    DULoxetine (CYMBALTA) capsule 60 mg, 60 mg, Oral, DAILY, Gurney Osler, MD, 60 mg at 03/12/23 0811    finasteride (PROSCAR) tablet 5 mg, 5 mg, Oral, DAILY, Gurney Osler, MD, 5 mg at 03/12/23 7060    hydrALAZINE (APRESOLINE) tablet 100 mg, 100 mg, Oral, TID, Gurney Osler, MD, 100 mg at 03/12/23 0811    losartan (COZAAR) tablet 50 mg, 50 mg, Oral, DAILY, Gurney Osler, MD, 50 mg at 03/12/23 0811    pantoprazole (PROTONIX) tablet 40 mg, 40 mg, Oral, DAILY, Gurney Osler, MD, 40 mg at 03/12/23 0811    pregabalin (LYRICA) capsule 75 mg, 75 mg, Oral, QHS, Gurney Osler, MD, 75 mg at 03/11/23 2129    rosuvastatin (CRESTOR) tablet 40 mg, 40 mg, Oral, QHS, Jorge Chavez MD, 40 mg at 03/11/23 2130    sevelamer carbonate (RENVELA) tab 1,600 mg, 1,600 mg, Oral, TID WITH MEALS, Jorge Chavez MD, 1,600 mg at 03/12/23 1155    sucralfate (CARAFATE) tablet 1 g, 1 g, Oral, AC&HS, Jorge Chavez MD, 1 g at 03/12/23 1130    tamsulosin (FLOMAX) capsule 0.4 mg, 0.4 mg, Oral, DAILY, Jorge Chavez MD, 0.4 mg at 03/12/23 0811    heparin (porcine) injection 5,000 Units, 5,000 Units, SubCUTAneous, Q12H, Jorge Chavez MD, 5,000 Units at 03/12/23 0466    furosemide (LASIX) tablet 40 mg, 40 mg, Oral, BID, Jorge Chavez MD, 40 mg at 03/12/23 8945     Procedures: see electronic medical records for all procedures/Xrays and details which were not copied into this note but were reviewed prior to creation of Plan. Reviewed most current lab test results and cultures  YES  Reviewed most current radiology test results   YES  Review and summation of old records today    NO  Reviewed patient's current orders and MAR    YES  PMH/ reviewed - no change compared to H&P  ________________________________________________________________________  Care Plan discussed with:    Comments   Patient x    Family  x    RN x    Care Manager x    Consultant                       x Multidiciplinary team rounds were held today with , nursing, pharmacist and clinical coordinator. Patient's plan of care was discussed; medications were reviewed and discharge planning was addressed.      ________________________________________________________________________  Total NON critical care TIME:   25Minutes    Total CRITICAL CARE TIME Spent:Minutes non procedure based      Comments   >50% of visit spent in counseling and coordination of care x     This includes time during multidisciplinary rounds if indicated above   ________________________________________________________________________  Abdiaziz Caldera MD

## 2023-03-12 NOTE — PROGRESS NOTES
1900: Bedside and Verbal shift change report given to Jay Cohen RN (oncoming nurse) by Missy Rizvi RN (offgoing nurse). Report included the following information SBAR, Kardex, and MAR. Bedside and Verbal shift change report given to Dorinda Dietrich RN (oncoming nurse) by Jay Cohen RN (offgoing nurse). Report included the following information SBAR, Kardex, and MAR.

## 2023-03-12 NOTE — PROGRESS NOTES
Bedside shift change report given to Xiomara Gomez RN (oncoming nurse) by Junaid Prakash RN (offgoing nurse). Report included the following information SBAR, Kardex, Intake/Output, MAR, and Recent Results.

## 2023-03-12 NOTE — PROGRESS NOTES
1900: Bedside and Verbal shift change report given to Carrie Saldivar RN (oncoming nurse) by David Ha RN (offgoing nurse). Report included the following information SBAR, Kardex, and MAR. End of Shift Note    Bedside shift change report given to David Ha RN (oncoming nurse) by Judy Abel RN (offgoing nurse). Report included the following information SBAR, Kardex, MAR, and Recent Results    Shift worked:  1850-1317     Shift summary and any significant changes:     Patient given PRN pain medication twice     Concerns for physician to address:       Zone phone for oncoming shift:          Activity:  Activity Level: Bath Room Privileges  Number times ambulated in hallways past shift: 0  Number of times OOB to chair past shift: 0    Cardiac:   Cardiac Monitoring: Yes      Cardiac Rhythm: Sinus Rhythm    Access:  Current line(s): PIV     Genitourinary:   Urinary status: oliguric    Respiratory:   O2 Device: Nasal cannula  Chronic home O2 use?: YES  Incentive spirometer at bedside: NO       GI:  Last Bowel Movement Date: 03/11/23  Current diet:  ADULT DIET Regular; 4 carb choices (60 gm/meal); Low Sodium (2 gm)  Passing flatus: YES  Tolerating current diet: YES       Pain Management:   Patient states pain is manageable on current regimen: YES    Skin:  Corey Score: 21  Interventions: increase time out of bed    Patient Safety:  Fall Score:  Total Score: 2  Interventions: gripper socks       Length of Stay:  Expected LOS: 3d 9h  Actual LOS: 8      Judy Abel RN

## 2023-03-12 NOTE — PROGRESS NOTES
Problem: Risk for Spread of Infection  Goal: Prevent transmission of infectious organism to others  Description: Prevent the transmission of infectious organisms to other patients, staff members, and visitors. Outcome: Progressing Towards Goal     Problem: Falls - Risk of  Goal: *Absence of Falls  Description: Document Williamsalaina Moyer Fall Risk and appropriate interventions in the flowsheet.   Outcome: Progressing Towards Goal  Note: Fall Risk Interventions:            Medication Interventions: Bed/chair exit alarm

## 2023-03-12 NOTE — PROGRESS NOTES
Bedside shift change report given to Kelsy Barrera RN (oncoming nurse) by Kelsy Barrera RN (offgoing nurse). Report included the following information SBAR, Kardex, Intake/Output, and MAR.

## 2023-03-12 NOTE — PROGRESS NOTES
Problem: Risk for Spread of Infection  Goal: Prevent transmission of infectious organism to others  Description: Prevent the transmission of infectious organisms to other patients, staff members, and visitors. Outcome: Progressing Towards Goal     Problem: Falls - Risk of  Goal: *Absence of Falls  Description: Document Machelle Patrick Fall Risk and appropriate interventions in the flowsheet.   Outcome: Progressing Towards Goal  Note: Fall Risk Interventions:            Medication Interventions: Bed/chair exit alarm

## 2023-03-13 VITALS
WEIGHT: 162.04 LBS | TEMPERATURE: 98.1 F | DIASTOLIC BLOOD PRESSURE: 78 MMHG | HEART RATE: 79 BPM | SYSTOLIC BLOOD PRESSURE: 155 MMHG | BODY MASS INDEX: 23.93 KG/M2 | OXYGEN SATURATION: 91 % | RESPIRATION RATE: 16 BRPM

## 2023-03-13 LAB
ALBUMIN SERPL-MCNC: 2.3 G/DL (ref 3.5–5)
ANION GAP SERPL CALC-SCNC: 6 MMOL/L (ref 5–15)
BASOPHILS # BLD: 0 K/UL (ref 0–0.1)
BASOPHILS NFR BLD: 0 % (ref 0–1)
BUN SERPL-MCNC: 73 MG/DL (ref 6–20)
BUN/CREAT SERPL: 9 (ref 12–20)
CALCIUM SERPL-MCNC: 8.6 MG/DL (ref 8.5–10.1)
CHLORIDE SERPL-SCNC: 95 MMOL/L (ref 97–108)
CO2 SERPL-SCNC: 26 MMOL/L (ref 21–32)
CREAT SERPL-MCNC: 7.75 MG/DL (ref 0.7–1.3)
DIFFERENTIAL METHOD BLD: ABNORMAL
EOSINOPHIL # BLD: 0.5 K/UL (ref 0–0.4)
EOSINOPHIL NFR BLD: 6 % (ref 0–7)
ERYTHROCYTE [DISTWIDTH] IN BLOOD BY AUTOMATED COUNT: 15.5 % (ref 11.5–14.5)
GLUCOSE BLD STRIP.AUTO-MCNC: 118 MG/DL (ref 65–117)
GLUCOSE BLD STRIP.AUTO-MCNC: 177 MG/DL (ref 65–117)
GLUCOSE BLD STRIP.AUTO-MCNC: 87 MG/DL (ref 65–117)
GLUCOSE SERPL-MCNC: 76 MG/DL (ref 65–100)
HBV SURFACE AB SER QL: REACTIVE
HBV SURFACE AB SER-ACNC: 12.01 MIU/ML
HBV SURFACE AG SER QL: <0.1 INDEX
HBV SURFACE AG SER QL: NEGATIVE
HCT VFR BLD AUTO: 32 % (ref 36.6–50.3)
HGB BLD-MCNC: 9.9 G/DL (ref 12.1–17)
IMM GRANULOCYTES # BLD AUTO: 0 K/UL (ref 0–0.04)
IMM GRANULOCYTES NFR BLD AUTO: 0 % (ref 0–0.5)
LYMPHOCYTES # BLD: 1.3 K/UL (ref 0.8–3.5)
LYMPHOCYTES NFR BLD: 17 % (ref 12–49)
MCH RBC QN AUTO: 24.8 PG (ref 26–34)
MCHC RBC AUTO-ENTMCNC: 30.9 G/DL (ref 30–36.5)
MCV RBC AUTO: 80.2 FL (ref 80–99)
MONOCYTES # BLD: 1.2 K/UL (ref 0–1)
MONOCYTES NFR BLD: 15 % (ref 5–13)
NEUTS SEG # BLD: 4.9 K/UL (ref 1.8–8)
NEUTS SEG NFR BLD: 62 % (ref 32–75)
NRBC # BLD: 0 K/UL (ref 0–0.01)
NRBC BLD-RTO: 0 PER 100 WBC
PHOSPHATE SERPL-MCNC: 3.8 MG/DL (ref 2.6–4.7)
PLATELET # BLD AUTO: 265 K/UL (ref 150–400)
PMV BLD AUTO: 10.3 FL (ref 8.9–12.9)
POTASSIUM SERPL-SCNC: 4.9 MMOL/L (ref 3.5–5.1)
RBC # BLD AUTO: 3.99 M/UL (ref 4.1–5.7)
SERVICE CMNT-IMP: ABNORMAL
SERVICE CMNT-IMP: ABNORMAL
SERVICE CMNT-IMP: NORMAL
SODIUM SERPL-SCNC: 127 MMOL/L (ref 136–145)
WBC # BLD AUTO: 7.9 K/UL (ref 4.1–11.1)

## 2023-03-13 PROCEDURE — P9047 ALBUMIN (HUMAN), 25%, 50ML: HCPCS | Performed by: SURGERY

## 2023-03-13 PROCEDURE — 36415 COLL VENOUS BLD VENIPUNCTURE: CPT

## 2023-03-13 PROCEDURE — 85025 COMPLETE CBC W/AUTO DIFF WBC: CPT

## 2023-03-13 PROCEDURE — 77010033678 HC OXYGEN DAILY

## 2023-03-13 PROCEDURE — 90935 HEMODIALYSIS ONE EVALUATION: CPT

## 2023-03-13 PROCEDURE — 86706 HEP B SURFACE ANTIBODY: CPT

## 2023-03-13 PROCEDURE — 74011250637 HC RX REV CODE- 250/637: Performed by: SURGERY

## 2023-03-13 PROCEDURE — 74011636637 HC RX REV CODE- 636/637: Performed by: INTERNAL MEDICINE

## 2023-03-13 PROCEDURE — 80069 RENAL FUNCTION PANEL: CPT

## 2023-03-13 PROCEDURE — 74011250636 HC RX REV CODE- 250/636: Performed by: SURGERY

## 2023-03-13 PROCEDURE — 87340 HEPATITIS B SURFACE AG IA: CPT

## 2023-03-13 PROCEDURE — 74011000250 HC RX REV CODE- 250: Performed by: SURGERY

## 2023-03-13 PROCEDURE — 82962 GLUCOSE BLOOD TEST: CPT

## 2023-03-13 RX ORDER — FUROSEMIDE 40 MG/1
40 TABLET ORAL 2 TIMES DAILY
Qty: 30 TABLET | Refills: 2 | Status: SHIPPED | OUTPATIENT
Start: 2023-03-13

## 2023-03-13 RX ADMIN — Medication 10 UNITS: at 08:12

## 2023-03-13 RX ADMIN — HYDRALAZINE HYDROCHLORIDE 100 MG: 50 TABLET, FILM COATED ORAL at 08:11

## 2023-03-13 RX ADMIN — AMLODIPINE BESYLATE 10 MG: 5 TABLET ORAL at 08:11

## 2023-03-13 RX ADMIN — CARVEDILOL 12.5 MG: 12.5 TABLET, FILM COATED ORAL at 17:05

## 2023-03-13 RX ADMIN — HYDRALAZINE HYDROCHLORIDE 100 MG: 50 TABLET, FILM COATED ORAL at 17:05

## 2023-03-13 RX ADMIN — SEVELAMER CARBONATE 1600 MG: 800 TABLET, FILM COATED ORAL at 08:11

## 2023-03-13 RX ADMIN — SUCRALFATE 1 G: 1 TABLET ORAL at 06:59

## 2023-03-13 RX ADMIN — FUROSEMIDE 40 MG: 40 TABLET ORAL at 17:05

## 2023-03-13 RX ADMIN — ASPIRIN 81 MG: 81 TABLET, CHEWABLE ORAL at 08:11

## 2023-03-13 RX ADMIN — Medication 10 UNITS: at 17:05

## 2023-03-13 RX ADMIN — SEVELAMER CARBONATE 1600 MG: 800 TABLET, FILM COATED ORAL at 17:05

## 2023-03-13 RX ADMIN — ALBUMIN (HUMAN) 12.5 G: 0.25 INJECTION, SOLUTION INTRAVENOUS at 12:58

## 2023-03-13 RX ADMIN — SODIUM CHLORIDE, PRESERVATIVE FREE 10 ML: 5 INJECTION INTRAVENOUS at 15:47

## 2023-03-13 RX ADMIN — LOSARTAN POTASSIUM 50 MG: 50 TABLET, FILM COATED ORAL at 08:11

## 2023-03-13 RX ADMIN — CARVEDILOL 12.5 MG: 12.5 TABLET, FILM COATED ORAL at 08:11

## 2023-03-13 RX ADMIN — INSULIN GLARGINE 15 UNITS: 100 INJECTION, SOLUTION SUBCUTANEOUS at 08:12

## 2023-03-13 RX ADMIN — OXYCODONE HYDROCHLORIDE 2.5 MG: 5 TABLET ORAL at 08:22

## 2023-03-13 RX ADMIN — OXYCODONE HYDROCHLORIDE 2.5 MG: 5 TABLET ORAL at 15:47

## 2023-03-13 RX ADMIN — TAMSULOSIN HYDROCHLORIDE 0.4 MG: 0.4 CAPSULE ORAL at 08:11

## 2023-03-13 RX ADMIN — HEPARIN SODIUM 5000 UNITS: 5000 INJECTION INTRAVENOUS; SUBCUTANEOUS at 08:11

## 2023-03-13 RX ADMIN — PANTOPRAZOLE SODIUM 40 MG: 40 TABLET, DELAYED RELEASE ORAL at 08:11

## 2023-03-13 RX ADMIN — SUCRALFATE 1 G: 1 TABLET ORAL at 17:05

## 2023-03-13 RX ADMIN — SODIUM CHLORIDE, PRESERVATIVE FREE 10 ML: 5 INJECTION INTRAVENOUS at 06:59

## 2023-03-13 RX ADMIN — FINASTERIDE 5 MG: 5 TABLET, FILM COATED ORAL at 08:11

## 2023-03-13 RX ADMIN — DULOXETINE 60 MG: 30 CAPSULE, DELAYED RELEASE ORAL at 08:11

## 2023-03-13 RX ADMIN — OXYCODONE HYDROCHLORIDE 2.5 MG: 5 TABLET ORAL at 01:29

## 2023-03-13 RX ADMIN — Medication 1 AMPULE: at 08:22

## 2023-03-13 RX ADMIN — FUROSEMIDE 40 MG: 40 TABLET ORAL at 08:11

## 2023-03-13 NOTE — DISCHARGE INSTRUCTIONS
DISCHARGE DIAGNOSIS:  Altered mental status secondary to severe hypoglycemia resolved   Uncontrolled diabetes mellitus with labile blood sugars  Thrombectomy LUE AV loop graft   Code blue on 3/7  Acute on chronic hypoxic respiratory failure, chronically on 2 L/M  S/s fluid overload in the setting of missing HD  Pulmonary edema and B/L pleural effusion, L>right  Nephrology consulted. HD as per nephrology  Right back Pain:  Likely s/s pleural effusion vs right pyelonephritis. Hypertension:  ESRD on HD  Suspected UTI         MEDICATIONS:  It is important that you take the medication exactly as they are prescribed. Keep your medication in the bottles provided by the pharmacist and keep a list of the medication names, dosages, and times to be taken in your wallet. Do not take other medications without consulting your doctor. Pain Management: per above medications    What to do at Home    Recommended diet:  Renal Diet    Recommended activity: Activity as tolerated    If you have questions regarding the hospital related prescriptions or hospital related issues please call [address] [facility] [address] at . You can always direct your questions to your primary care doctor if you are unable to reach your hospital physician; your PCP works as an extension of your hospital doctor just like your hospital doctor is an extension of your PCP for your time at the hospital [facility], [facility]).     If you experience any of the following symptoms then please call your primary care physician or return to the emergency room if you cannot get hold of your doctor:  Fever, chills, nausea, vomiting, diarrhea, change in mentation, falling, bleeding, shortness of breath,

## 2023-03-13 NOTE — DISCHARGE SUMMARY
Hospitalist Discharge Summary     Patient ID:  Hakan Anderson  [id number]  38 y.o.  1980  3/4/2023    PCP on record: Lena Santana MD    Admit date: 3/4/2023  Discharge date and time: 3/13/2023    DISCHARGE DIAGNOSIS:  Acute on chronic hypoxic respiratory failure, chronically on 2 L/M  S/s fluid overload in the setting of missing HD  Pulmonary edema and B/L pleural effusion, L>right  Altered mental status secondary to severe hypoglycemia resolved   Uncontrolled diabetes mellitus with labile blood sugars  Thrombectomy LUE AV loop graft   Code blue on 3/7  Right back Pain:  Likely s/s pleural effusion vs right pyelonephritis. Hypertension:  ESRD on HD  Suspected UTI      CONSULTATIONS:  IP CONSULT TO NEPHROLOGY  IP CONSULT TO VASCULAR SURGERY    Excerpted HPI from H&P of David Pappas MD:  CHIEF COMPLAINT: shortness of breath. HISTORY OF PRESENT ILLNESS:     Jayson Phillips is a 38 y.o. with hx of type I DM, hypertension, ESRD on HD presented to ED from [facility] for hemodialysis. Patient presented to [facility] ED with c/o right back pain. Patient missed HD on Friday as he slept all day. Patient reports that he had right back pain on Thursday night and couldn't sleep and finally went to sleep on Friday, so he didn't want to go to HD, so missed HD. Patient uses 2L of oxygen on baseline. But had increased oxygen requirement upto 6L at OSH. CXR showed pulmonary edema and B/L pleural effusion. Right back pain is pleuritic in nature. Patient reports that he was told that he has urine infection at OSH. Patient is currently saturation upper 80s to lower 90s on 4L of supplemental oxygen. Labs remarkable for elevated blood glucose of 595, no anion gap. However, is acidotic with pH Of 7.2 on VBG. We were asked to admit for work up and evaluation of the above problems.      ______________________________________________________________________  DISCHARGE SUMMARY/HOSPITAL COURSE:  for full details see H&P, daily progress notes, labs, consult notes. Acute on chronic hypoxic respiratory failure, chronically on 2 L/M  S/s fluid overload in the setting of missing HD  Pulmonary edema and B/L pleural effusion, L>right  This is a end-stage renal disease patient on hemodialysis was admitted for acute on chronic respiratory failure with hypoxia secondary to fluid overload, pleural effusion. Received hemodialysis with improvement of his oxygenation  His hospital course. Complicated by episode of hypoglycemia leading to altered mental status, complicated by AV fistula malfunction which was repaired with thrombectomy of the left upper extremity AV graft. Patient also had a code blue on 3/7 due to too much pain medication  Patient was stable upon discharge, his A- V fistula which was declotted was able to be used for his hemodialysis    _______________________________________________________________________  Patient seen and examined by me on discharge day. Pertinent Findings:  Gen:    Not in distress  Chest: Clear lungs  CVS:   Regular rhythm. No edema  Abd:  Soft, not distended, not tender  Neuro:  Alert, oriented x3  _______________________________________________________________________  DISCHARGE MEDICATIONS:   Current Discharge Medication List        CONTINUE these medications which have CHANGED    Details   furosemide (LASIX) 40 mg tablet Take 1 Tablet by mouth two (2) times a day. Qty: 30 Tablet, Refills: 2  Start date: 3/13/2023           CONTINUE these medications which have NOT CHANGED    Details   aspirin 81 mg chewable tablet Take 81 mg by mouth daily. sucralfate (CARAFATE) 1 gram tablet Take 1 Tablet by mouth Before breakfast, lunch, dinner and at bedtime for 62 days.   Qty: 120 Tablet, Refills: 3    Associated Diagnoses: Gastric ulcer without hemorrhage or perforation, unspecified chronicity      flash glucose sensor (NingStyle Connor 14 Day Sensor) kit Administer blood sugars four times daily  Qty: 2 Kit, Refills: 5    Associated Diagnoses: DM type 2, goal HbA1c < 7% ([facility])      Insulin Syringe-Needle U-100 1 mL 28 x 5/16\" syrg 1 Syringe by SubCUTAneous route Before breakfast, lunch, dinner and at bedtime. Qty: 200 Each, Refills: 2      insulin lispro (HUMALOG) 100 unit/mL kwikpen Administer 10 units before each meal  Qty: 5 Pen, Refills: 5    Associated Diagnoses: DM type 2, goal HbA1c < 7% ([facility])      carvediloL (COREG) 12.5 mg tablet Take 1 Tablet by mouth two (2) times daily (with meals). Qty: 180 Tablet, Refills: 1    Associated Diagnoses: Primary hypertension      amLODIPine (NORVASC) 10 mg tablet Take 1 Tablet by mouth daily. Qty: 90 Tablet, Refills: 1    Associated Diagnoses: Primary hypertension      finasteride (PROSCAR) 5 mg tablet Take 1 Tablet by mouth daily. Qty: 90 Tablet, Refills: 1      hydrALAZINE (APRESOLINE) 100 mg tablet Take 1 Tablet by mouth three (3) times daily. Qty: 270 Tablet, Refills: 1    Associated Diagnoses: Primary hypertension      losartan (COZAAR) 50 mg tablet Take 1 Tablet by mouth daily. Qty: 90 Tablet, Refills: 1    Associated Diagnoses: Primary hypertension      pantoprazole (PROTONIX) 40 mg tablet Take 1 Tablet by mouth daily for 90 days. Qty: 90 Tablet, Refills: 2    Associated Diagnoses: Gastric ulcer without hemorrhage or perforation, unspecified chronicity      pregabalin (Lyrica) 75 mg capsule Take 1 Capsule by mouth nightly. Max Daily Amount: 75 mg. Qty: 90 Capsule, Refills: 1    Associated Diagnoses: Recurrent falls; Other diabetic neurological complication associated with type 2 diabetes mellitus (HCC)      tamsulosin (FLOMAX) 0.4 mg capsule Take 1 Capsule by mouth daily. Qty: 90 Capsule, Refills: 1    Associated Diagnoses: Benign prostatic hyperplasia with urinary frequency      rosuvastatin (CRESTOR) 40 mg tablet Take 1 Tablet by mouth nightly.   Qty: 90 Tablet, Refills: 1    Associated Diagnoses: Hypercholesteremia      flash glucose scanning reader (FreeStyle Connor 14 Day [place]) misc Administer blood sugars four times daily  Qty: 1 Each, Refills: 0    Associated Diagnoses: DM type 2, goal HbA1c < 7% ([facility])      cloNIDine (CATAPRES) 0.1 mg/24 hr ptwk 1 Patch by TransDERmal route every seven (7) days. Qty: 4 Patch, Refills: 1    Associated Diagnoses: Primary hypertension      !! DULoxetine (CYMBALTA) 60 mg capsule Take 1 Capsule by mouth daily. Qty: 90 Capsule, Refills: 1    Associated Diagnoses: Other diabetic neurological complication associated with type 2 diabetes mellitus (HCC)      sodium bicarbonate 650 mg tablet Take 650 mg by mouth three (3) times daily. !! OXYGEN-AIR DELIVERY SYSTEMS Take 2 L/min by inhalation continuous. !! OXYGEN-AIR DELIVERY SYSTEMS Take 2 L/min by inhalation continuous. sevelamer (RENAGEL) 800 mg tablet Take 1,600 mg by mouth three (3) times daily (with meals). lancets misc ACHS  Qty: 1 Each, Refills: 11      Blood-Glucose Meter monitoring kit ACHS  Qty: 1 Kit, Refills: 0      glucose blood VI test strips (blood glucose test) strip ACHS  Qty: 200 Strip, Refills: 0      !! DULoxetine (CYMBALTA) 60 mg capsule Take 1 capsule by mouth once daily  Qty: 30 Capsule, Refills: 0    Associated Diagnoses: Other diabetic neurological complication associated with type 1 diabetes mellitus (HCC)      naloxone (Narcan) 4 mg/actuation nasal spray Use 1 spray intranasally, then discard. Repeat with new spray every 2 min as needed for opioid overdose symptoms, alternating nostrils. Qty: 1 Each, Refills: 0    Associated Diagnoses: Chronic midline low back pain without sciatica      Ketone Blood Test strp 50 Each by Does Not Apply route as needed for PRN Reason (Other) (as needed for suspected dka). Qty: 50 Strip, Refills: 3      Walker (Ultra-Light Rollator) misc Use while ambulating  Qty: 1 Each, Refills: 0    Associated Diagnoses: DM type 2, goal HbA1c < 7% ([address]  Recurrent falls      Dexcom G6  misc Use with the dexcom device to check blood sugars 4 times a day  Qty: 1 Each, Refills: 0    Comments: [phone] dx code E10.65      Dexcom G6 Sensor brandi Use as directed every 10 days  Qty: 10 Each, Refills: 11    Comments: [phone] dx code E10.65      Dexcom G6 Transmitter brandi Use as directed  Qty: 10 Each, Refills: 3    Comments: [phone] dx code E10.65      Insulin Needles, Disposable, 31 gauge x 5/16\" ndle Dx code E11.65, use 6x daily  Qty: 200 Each, Refills: 11       !! - Potential duplicate medications found. Please discuss with provider. STOP taking these medications       fluconazole (DIFLUCAN) 100 mg tablet Comments:   Reason for Stopping:                 Patient Follow Up Instructions:    Activity: Activity as tolerated  Diet: Renal Diet  Wound Care: None needed      Follow-up Information       Follow up With Specialties Details Why Sharyle Lazar, MD Internal Medicine Physician   [address] [facility]      Etienne Coles MD Internal Medicine Physician   [address]  [phone]            ________________________________________________________________    Risk of deterioration: High    Condition at Discharge:  Stable  __________________________________________________________________    Disposition  Home with family    ____________________________________________________________________    Code Status: Full Code  ___________________________________________________________________      Total time in minutes spent coordinating this discharge (includes going over instructions, follow-up, prescriptions, and preparing report for sign off to her PCP) :  35 minutes    Signed:  Emili Irvin MD

## 2023-03-13 NOTE — PROGRESS NOTES
Transition of Care Plan:     RUR: 23%-High Risk  Disposition: Home with family support     Follow up appointments: PCP, Specialist  DME needed:  has rollator,Home oxygen - 2L- company unknown  Transportation at Discharge: Mother to provide transportation  [address] or means to access home: Mother  has access  IM Medicare Letter: 2nd IM Letter at d/c  Caregiver Contact: Judah Bainsd- [phone]  Discharge Caregiver contacted prior to discharge? If pt desires  Care Conference needed?:    no           Per chart review, plan for Pt to return home once medically stable. [facility].          Jene Kussmaul, [facility], CM  [facility]

## 2023-03-13 NOTE — PROGRESS NOTES
Hospitalist Progress Note    NAME: Nica Capellan   :  1980   MRN:  [id number]       Assessment / Plan: Altered mental status secondary to severe hypoglycemia resolved   Uncontrolled diabetes mellitus with labile blood sugars  Thrombectomy LUE AV loop graft  Patient was lethargic on  as blood sugar was in 26, improved with D10 to 250 mL x 2  Was more alert oriented upon my evaluation  Initially was treated for DKA, now having hypoglycemias  Lantus reduced from 10 units to 5 units  Continue sliding scale insulin  : Sugar in the 400s, Lantus increased to 10 unit, added mealtime insulin 10 unit before each meal , continue sliding scale  Educator consulted  03/10:  blood Sugar controlled   patient underwent   Thrombectomy LUE AV loop graft                          Completion fistulagram w/fluoro                          Angioplasty/stenting venous outflow anastomosis (8x3)                          L arm arteriogram                          Angioplasty inflow anastomosis       : Hemodialysis note had trouble using the newly declotted  AVF graft  The graft has good thrill, vasc Recommended to try again on Monday  Blood sugar elevated as patient has been snacking, blood sugar in the 300  Will increase Lantus and add mealtime lispro    : Blood sugar remains elevated around 400, adjusted insulin regiment, increase mealtime insulin  : blood sugars controlled   Code blue on 3/7  Became responsive after round of CPR  Most likely due to too much pain medication  Pain medication was adjusted  As per mother, pt had few similar episodes at home    Acute on chronic hypoxic respiratory failure, chronically on 2 L/M  S/s fluid overload in the setting of missing HD  Pulmonary edema and B/L pleural effusion, L>right  Nephrology consulted.   HD as per nephrology  Continue oxygen and wean as tolerated      Right back Pain:  Likely s/s pleural effusion vs right pyelonephritis. UA neg  renal/bladder US neg  Lower percocet dose to 2.5 mg, discontinue Toradol as it is contraindicated in hemodialysis patient  CT abd pelvis reviewed   IMPRESSION  1. Bilateral pleural effusions and lower lobe atelectasis. 2.  No evidence of nephrolithiasis or flank hematoma/injury  Hypertension:  Resume home medications- coreg, amlodipine, hydralazine, losartan      ESRD on HD  Nephrology consulted  HD as per nephrology  AVF clotted, Vascular consulted  New carlos cath inserted on 3/6  AV fistula declotted on 03/10, HD nurse unable to use it on Friday, plan is to try using it on the next HD session which will be on Monday     Suspected UTI  Status post ceftriaxone     Code Status: full code  Surrogate Decision Maker:mother   DVT Prophylaxis: sc heparin  18.5 - 24.9 Normal weight / Body mass index is 23.93 kg/m². Recommended Disposition: Home w/Family  Anticipated Discharge Date: 03/13  SHAE Barriers:  malfunction   AV fistula graft, uncontrolled blood sugars       Subjective: Follow-up uncontrolled diabetes mellitus  No acute complaints  Review of Systems:  Symptom Y/N Comments  Symptom Y/N Comments   Fever/Chills n   Chest Pain n    Poor Appetite    Edema     Cough    Abdominal Pain n    Sputum    Joint Pain     SOB/JOHNSTON n   Pruritis/Rash     Nausea/vomit n   Tolerating PT/OT     Diarrhea    Tolerating Diet y    Constipation    Other       PO intake: No data found. Wt Readings from Last 10 Encounters:   03/12/23 73.5 kg (162 lb 0.6 oz)   01/10/23 85.3 kg (188 lb)   12/29/22 78 kg (171 lb 15.3 oz)   12/27/22 78 kg (171 lb 15.3 oz)   12/21/22 78 kg (171 lb 15.3 oz)   12/06/22 80.6 kg (177 lb 11.1 oz)   11/02/22 71.8 kg (158 lb 6.4 oz)   10/25/22 69.2 kg (152 lb 8.9 oz)   09/28/22 75.8 kg (167 lb 3.2 oz)   09/13/22 72.1 kg (159 lb)       Objective:     VITALS:   Last 24hrs VS reviewed since prior progress note.  Most recent are:  Patient Vitals for the past 24 hrs:   Temp Pulse Resp BP SpO2   03/13/23 1530 98.1 °F (36.7 °C) 79 16 (!) 155/78 91 %   03/13/23 1515 -- 73 16 135/76 95 %   03/13/23 1500 -- 75 16 133/70 96 %   03/13/23 1445 -- 74 16 135/79 95 %   03/13/23 1430 -- 74 16 (!) 142/77 93 %   03/13/23 1415 -- 74 16 131/87 93 %   03/13/23 1400 -- 74 16 135/76 92 %   03/13/23 1330 -- 71 16 139/77 93 %   03/13/23 1315 -- 72 16 119/74 96 %   03/13/23 1300 -- 72 16 (!) 98/43 96 %   03/13/23 1245 -- 72 16 (!) 93/44 93 %   03/13/23 1230 -- 74 16 127/73 91 %   03/13/23 1215 -- 71 16 (!) 141/78 94 %   03/13/23 1200 98 °F (36.7 °C) 71 16 (!) 151/79 94 %   03/13/23 0721 97.9 °F (36.6 °C) 83 20 (!) 156/82 93 %   03/13/23 0320 -- 76 16 132/77 91 %   03/13/23 0157 -- 80 -- -- 92 %   03/12/23 2324 -- 78 18 135/71 92 %   03/12/23 2119 -- 76 -- (!) 141/62 91 %   03/12/23 1947 98 °F (36.7 °C) 74 16 (!) 131/59 90 %   03/12/23 1633 98.1 °F (36.7 °C) 78 18 (!) 150/77 93 %         Intake/Output Summary (Last 24 hours) at 3/13/2023 1628  Last data filed at 3/13/2023 1530  Gross per 24 hour   Intake 120 ml   Output 3275 ml   Net -3155 ml          I had a face to face encounter, and independently examined this patient as outlined below:    PHYSICAL EXAM:  General:    No distress     HEENT: Atraumatic, anicteric sclerae, pink conjunctivae, MMM  Neck:  Supple, symmetrical  Lungs:   CTA. No Wheezing/Rhonchi. No rales. No tenderness. No Accessory muscle use. CVS:   Regular rhythm. No murmur. No JVD. L UE AVF w + thrill   GI/:   Soft. NT. ND. BS normal. Mild R CVAT  Extremities: No edema. No cyanosis. No clubbing. Skin:     Not pale. Not Jaundiced. No rashes   Psych:  Good insight. Not depressed. Not anxious or agitated. Neurologic: Alert and oriented X 4. EOMs intact. No facial asymmetry. No slurred speech. Symmetrical strength, Sensation grossly intact. Labs     I reviewed today's most current labs and imaging studies.   Pertinent labs include:  Recent Labs     03/13/23  1208 03/10/23  2259   WBC 7.9 5.6   HGB 9.9* 11.2*   HCT 32.0* 36.4*    211       Recent Labs     03/13/23  1208   *   K 4.9   CL 95*   CO2 26   GLU 76   BUN 73*   CREA 7.75*   CA 8.6   PHOS 3.8   ALB 2.3*       CT ABD PELV W CONT    Result Date: 3/7/2023  1. Bilateral pleural effusions and lower lobe atelectasis. 2.  No evidence of nephrolithiasis or flank hematoma/injury. US RETROPERITONEUM LTD    Result Date: 3/4/2023  Unremarkable Renal Sonogram.     XR CHEST PORT    Result Date: 3/4/2023  Pulmonary edema with moderate left and small right pleural effusions, and associated bibasilar airspace disease. IR INSERT NON TUNL CVC OVER 5 YRS    Result Date: 3/6/2023  1. Successful placement of right internal jugular temporary dialysis catheter. Catheter ready for immediate use. No results found. 10/16/22    ECHO ADULT COMPLETE 10/27/2022 10/27/2022    Interpretation Summary    Left Ventricle: Normal left ventricular systolic function with a visually estimated EF of 45 - 50%. Left ventricle size is normal. Normal wall thickness. Normal wall motion. Aortic Valve: Tricuspid valve.     Signed by: Evonne Rojas MD on 10/27/2022  8:23 AM     All Micro Results       None              Current Medications:     Current Facility-Administered Medications:     insulin lispro (HUMALOG) injection 10 Units, 10 Units, SubCUTAneous, TID WITH MEALS, Hiren Pete MD, 10 Units at 03/13/23 0812    insulin glargine (LANTUS) injection 15 Units, 15 Units, SubCUTAneous, DAILY, Hiren Pete MD, 15 Units at 03/13/23 0162    alcohol 62% (NOZIN) nasal  1 Ampule, 1 Ampule, Topical, Q12H, Kasia Koch MD, 1 Ampule at 03/13/23 4892    oxyCODONE IR (ROXICODONE) tablet 2.5 mg, 2.5 mg, Oral, Q6H PRN, Kasia Koch MD, 2.5 mg at 03/13/23 1547    heparin (porcine) 1,000 unit/mL injection 1,400 Units, 1,400 Units, InterCATHeter, DIALYSIS PRN, 1,400 Units at 03/11/23 0028 **AND** heparin (porcine) 1,000 unit/mL injection 1,100 Units, 1,100 Units, InterCATHeter, DIALYSIS PRN, Sebas Means MD, 1,100 Units at 03/11/23 0027    albumin human 25% (BUMINATE) solution 12.5 g, 12.5 g, IntraVENous, DIALYSIS PRN, Sebas Means MD, 12.5 g at 03/13/23 1258    heparin (porcine) 1,000 unit/mL injection 2,500 Units, 2,500 Units, Hemodialysis, DIALYSIS PRN, Sebas Means MD, 2,500 Units at 03/07/23 0313    sodium chloride (NS) flush 5-40 mL, 5-40 mL, IntraVENous, Q8H, Sebas Means MD, 10 mL at 03/13/23 1547    sodium chloride (NS) flush 5-40 mL, 5-40 mL, IntraVENous, PRN, Sebas Means MD    insulin lispro (HUMALOG) injection, , SubCUTAneous, AC&HS, Sebas Means MD, 10 Units at 03/12/23 1156    glucose chewable tablet 16 g, 4 Tablet, Oral, PRN, Sebas Means MD    glucagon (GLUCAGEN) injection 1 mg, 1 mg, IntraMUSCular, PRN, Sebas Means MD    dextrose 10% infusion 0-250 mL, 0-250 mL, IntraVENous, PRN, Sebas Means MD, 250 mL at 03/08/23 0744    cloNIDine HCL (CATAPRES) tablet 0.1 mg, 0.1 mg, Oral, Q6H PRN, Sebas Means MD, 0.1 mg at 03/10/23 0401    acetaminophen (TYLENOL) tablet 650 mg, 650 mg, Oral, Q6H PRN, 650 mg at 03/07/23 2129 **OR** acetaminophen (TYLENOL) suppository 650 mg, 650 mg, Rectal, Q6H PRN, Sebas Means MD    polyethylene glycol (MIRALAX) packet 17 g, 17 g, Oral, DAILY PRN, Sebas Means MD    ondansetron (ZOFRAN ODT) tablet 4 mg, 4 mg, Oral, Q8H PRN **OR** ondansetron (ZOFRAN) injection 4 mg, 4 mg, IntraVENous, Q6H PRN, Sebas Means MD    amLODIPine (NORVASC) tablet 10 mg, 10 mg, Oral, DAILY, Sebas Means MD, 10 mg at 03/13/23 4841    aspirin chewable tablet 81 mg, 81 mg, Oral, DAILY, Sebas Means MD, 81 mg at 03/13/23 0811    carvediloL (COREG) tablet 12.5 mg, 12.5 mg, Oral, BID WITH MEALS, Sebas Means MD, 12.5 mg at 03/13/23 0811    cloNIDine (CATAPRES) 0.1 mg/24 hr patch 1 Patch, 1 Patch, TransDERmal, Q7D, Magnus Gonzalez MD, 1 Patch at 03/11/23 1508    DULoxetine (CYMBALTA) capsule 60 mg, 60 mg, Oral, DAILY, Magnus Gonzalez MD, 60 mg at 03/13/23 0811    finasteride (PROSCAR) tablet 5 mg, 5 mg, Oral, DAILY, Magnus Gonzalez MD, 5 mg at 03/13/23 5896    hydrALAZINE (APRESOLINE) tablet 100 mg, 100 mg, Oral, TID, Magnus Gonzalez MD, 100 mg at 03/13/23 0811    losartan (COZAAR) tablet 50 mg, 50 mg, Oral, DAILY, Magnus Gonzalez MD, 50 mg at 03/13/23 0811    pantoprazole (PROTONIX) tablet 40 mg, 40 mg, Oral, DAILY, Magnus Gonzalez MD, 40 mg at 03/13/23 0811    pregabalin (LYRICA) capsule 75 mg, 75 mg, Oral, QHS, Magnus Gonzalez MD, 75 mg at 03/12/23 2119    rosuvastatin (CRESTOR) tablet 40 mg, 40 mg, Oral, QHS, Magnus Gonzalez MD, 40 mg at 03/12/23 2120    sevelamer carbonate (RENVELA) tab 1,600 mg, 1,600 mg, Oral, TID WITH MEALS, Magnus Gonzalez MD, 1,600 mg at 03/13/23 0811    sucralfate (CARAFATE) tablet 1 g, 1 g, Oral, AC&HS, Magnus Gonzalez MD, 1 g at 03/13/23 0659    tamsulosin (FLOMAX) capsule 0.4 mg, 0.4 mg, Oral, DAILY, Magnus Gonzalez MD, 0.4 mg at 03/13/23 0811    heparin (porcine) injection 5,000 Units, 5,000 Units, SubCUTAneous, Q12H, Magnus Gonzalez MD, 5,000 Units at 03/13/23 6441    furosemide (LASIX) tablet 40 mg, 40 mg, Oral, BID, Magnus Gonzalez MD, 40 mg at 03/13/23 5848     Procedures: see electronic medical records for all procedures/Xrays and details which were not copied into this note but were reviewed prior to creation of Plan.     Reviewed most current lab test results and cultures  YES  Reviewed most current radiology test results   YES  Review and summation of old records today    NO  Reviewed patient's current orders and MAR    YES  PMH/SH reviewed - no change compared to H&P  ________________________________________________________________________  Care Plan discussed with:    Comments   Patient x    Family  x    RN x    Care Manager x    Consultant x Multidiciplinary team rounds were held today with , nursing, pharmacist and clinical coordinator. Patient's plan of care was discussed; medications were reviewed and discharge planning was addressed.      ________________________________________________________________________  Total NON critical care TIME:   25Minutes    Total CRITICAL CARE TIME Spent:Minutes non procedure based      Comments   >50% of visit spent in counseling and coordination of care x     This includes time during multidisciplinary rounds if indicated above   ________________________________________________________________________  Rolanda Kauffman MD Previous Accession (Optional): [id number] Previous Accession (Optional): [id number]

## 2023-03-13 NOTE — OP NOTES
[address]  OPERATIVE REPORT    Name:  Hilda Worthy  MR#:  [id number]  :  1980  ACCOUNT #:  [de-identified]  DATE OF SERVICE:  03/10/2023    PREOPERATIVE DIAGNOSIS:  End-stage renal disease with thrombosed left upper arm arteriovenous graft. POSTOPERATIVE DIAGNOSIS:  End-stage renal disease with thrombosed left upper arm arteriovenous graft. PROCEDURE PERFORMED:  1. Mechanical catheter thrombectomy, left upper arm AV graft. 2.  Completion of fistulogram with intraoperative C-arm fluoroscopy. 3.  Balloon angioplasty and sent placement and venous outflow (8 x 3). 4.  Selective catheterization and injection of left brachial artery. 5.  Left arm arteriogram.    SURGEON:  Maykel Alexander MD    ANESTHESIA:  General.    COMPLICATIONS:  none. ESTIMATED BLOOD LOSS:  minimal.    INDICATIONS:  The patient is a 46-year-old with end-stage renal disease who has a thrombosed left upper arm AV graft and need a thrombectomy. PROCEDURE:  After obtaining informed consent, the patient was supine on the operating table. After adequate induction of general anesthesia, site and patient confirmation, administration of prophylactic antibiotics, the left upper arm was prepped and draped in sterile fashion. A transverse incision was made over the distal part of the loop and the underlying graft dissected free and controlled. A transverse graftotomy was made and the patient was systemically heparinized. A #4 Vee catheter was passed retrograde through the venous (medial) limb of the graft and fresh thrombus removed with modest backbleeding. A 7-Estonian sheath was placed antegrade towards the venous anastomosis and an on-table completion fistulogram revealed a high-grade stenosis with some adherent thrombus at the venous outflow. This was covered with a 8 x 3 self-expanding bare-metal stent which is postdilated with a complete resolution and good angiographic results.     A full Vee catheter was then passed retrograde through the arterial anastomosis with some difficulty and modest arterial inflow reestablished. A Glidewire and 5-British catheter were then carefully passed retrograde through the arterial anastomosis into the brachial artery and the left arm arteriogram obtained. This revealed a normal takeoff of the arterial inflow anastomosis with some residual thrombus which was adherent. Therefore, an over-the-wire Vee catheter was used to completely clear the arterial inflow and reestablish brisk flow. The graftotomy was closed with Prolene suture and the skin closed with 5-0 nylon sutures. The patient tolerated the procedure well. There were no complications.         MD MODE [id number]/[id number]  D:  03/12/2023 16:54  T:  03/13/2023 0:10  JOB #:  [id number]  CC:  Chandni Madrid MD

## 2023-03-13 NOTE — PROGRESS NOTES
Nephrology Progress Note  [facility] / [address], [place], [address]  Phone - [phone]  Fax - [phone]                 Patient: Cadence Waldron                   YOB: 1980        Date- 3/13/2023                      Admit Date: 3/4/2023  CC: Follow up for esrd      IMPRESSION & PLAN:   ESRD - on hd mwf at [facility]  Hyponatremia  Fluid overload  Anemia of ckd  Pulmonary edema  hypertension  Uncontrolled DM  H/o Urinary retention requiring hansen cath in pasts  Type 1 diabetes  Non compliant with fluid restrictions. AV graft malfunction(status post thrombectomy ,balloon angioplasty and stent placement)      PLAN-  Plan for hemodialysis today   We will try to use AV graft for HD today  If AV graft functioning well then will pull Francis catheter  Continue norvasc, coreg, clonidine, hydralazine, losartan. Continue hd mwf  Follow bmp     Subjective: Interval History:   Seen and examined today  Shortness of breath much better  Status post AV graft declot yesterday  Objective:   Vitals:    03/12/23 2324 03/13/23 0157 03/13/23 0320 03/13/23 0721   BP: 135/71  132/77 (!) 156/82   Pulse: 78 80 76 83   Resp: 18  16 20   Temp:    97.9 °F (36.6 °C)   SpO2: 92% 92% 91% 93%   Weight:          03/12 0701 - 03/13 0700  In: 120 [P.O.:120]  Out: -     Last 3 Recorded Weights in this Encounter    03/10/23 0401 03/11/23 0758 03/12/23 0733   Weight: 69.9 kg (154 lb 1.6 oz) 70.5 kg (155 lb 6.8 oz) 73.5 kg (162 lb 0.6 oz)        Physical exam:    GEN:  NAD  NECK:  Supple, no thyromegaly  RESP: Clear  b/l, no  wheezing,   CVS: RRR,S1,S2   NEURO: non focal, normal speech  EXT: Edema +nt       Right ij francis +  Left arm avg - no bruit or thrill    Chart reviewed. Pertinent Notes reviewed.      Data Review :  Recent Labs     03/10/23  0952   *   K 3.8      CO2 22   BUN 47*   CREA 5.71*   * CA 7.9*       Recent Labs     03/10/23  2259   WBC 5.6   HGB 11.2*   HCT 36.4*          No results for input(s): FE, TIBC, PSAT, FERR in the last 72 hours.    Lab Results   Component Value Date/Time    Hemoglobin A1c 12.3 (H) 03/04/2023 03:37 PM    Hemoglobin A1c 10.1 (H) 11/29/2022 08:48 PM    Hemoglobin A1c 7.2 (H) 09/26/2022 09:34 AM        Lab Results   Component Value Date/Time    Microalbumin/Creat ratio (mg/g creat) 11,439 (H) 04/01/2021 10:00 AM    Microalbumin,urine random 151.00 04/01/2021 10:00 AM     US Results (most recent):  Medication list  reviewed  Current Facility-Administered Medications   Medication Dose Route Frequency    insulin lispro (HUMALOG) injection 10 Units  10 Units SubCUTAneous TID WITH MEALS    insulin glargine (LANTUS) injection 15 Units  15 Units SubCUTAneous DAILY    alcohol 62% (NOZIN) nasal  1 Ampule  1 Ampule Topical Q12H    oxyCODONE IR (ROXICODONE) tablet 2.5 mg  2.5 mg Oral Q6H PRN    heparin (porcine) 1,000 unit/mL injection 1,400 Units  1,400 Units InterCATHeter DIALYSIS PRN    And    heparin (porcine) 1,000 unit/mL injection 1,100 Units  1,100 Units InterCATHeter DIALYSIS PRN    albumin human 25% (BUMINATE) solution 12.5 g  12.5 g IntraVENous DIALYSIS PRN    heparin (porcine) 1,000 unit/mL injection 2,500 Units  2,500 Units Hemodialysis DIALYSIS PRN    sodium chloride (NS) flush 5-40 mL  5-40 mL IntraVENous Q8H    sodium chloride (NS) flush 5-40 mL  5-40 mL IntraVENous PRN    insulin lispro (HUMALOG) injection   SubCUTAneous AC&HS    glucose chewable tablet 16 g  4 Tablet Oral PRN    glucagon (GLUCAGEN) injection 1 mg  1 mg IntraMUSCular PRN    dextrose 10% infusion 0-250 mL  0-250 mL IntraVENous PRN    cloNIDine HCL (CATAPRES) tablet 0.1 mg  0.1 mg Oral Q6H PRN    acetaminophen (TYLENOL) tablet 650 mg  650 mg Oral Q6H PRN    Or    acetaminophen (TYLENOL) suppository 650 mg  650 mg Rectal Q6H PRN    polyethylene glycol (MIRALAX) packet 17 g  17 g Oral DAILY PRN    ondansetron (ZOFRAN ODT) tablet 4 mg  4 mg Oral Q8H PRN    Or    ondansetron (ZOFRAN) injection 4 mg  4 mg IntraVENous Q6H PRN    amLODIPine (NORVASC) tablet 10 mg  10 mg Oral DAILY    aspirin chewable tablet 81 mg  81 mg Oral DAILY    carvediloL (COREG) tablet 12.5 mg  12.5 mg Oral BID WITH MEALS    cloNIDine (CATAPRES) 0.1 mg/24 hr patch 1 Patch  1 Patch TransDERmal Q7D    DULoxetine (CYMBALTA) capsule 60 mg  60 mg Oral DAILY    finasteride (PROSCAR) tablet 5 mg  5 mg Oral DAILY    hydrALAZINE (APRESOLINE) tablet 100 mg  100 mg Oral TID    losartan (COZAAR) tablet 50 mg  50 mg Oral DAILY    pantoprazole (PROTONIX) tablet 40 mg  40 mg Oral DAILY    pregabalin (LYRICA) capsule 75 mg  75 mg Oral QHS    rosuvastatin (CRESTOR) tablet 40 mg  40 mg Oral QHS    sevelamer carbonate (RENVELA) tab 1,600 mg  1,600 mg Oral TID WITH MEALS    sucralfate (CARAFATE) tablet 1 g  1 g Oral AC&HS    tamsulosin (FLOMAX) capsule 0.4 mg  0.4 mg Oral DAILY    heparin (porcine) injection 5,000 Units  5,000 Units SubCUTAneous Q12H    furosemide (LASIX) tablet 40 mg  40 mg Oral BID        Leonor Strong MD  3/13/2023

## 2023-03-13 NOTE — PROGRESS NOTES
Reviewed discharge instructions with patient. Patient verbalizes understanding. PIV x 2 removed, tele box removed and returned to tele room.

## 2023-03-13 NOTE — PROCEDURES
Hemodialysis / [phone]    Vitals Pre Post Assessment Pre Post   BP BP: (!) 151/79 (03/13/23 1200)   155/78 LOC flowsheet flowsheet   HR Pulse (Heart Rate): 71 (03/13/23 1200) 79 Lungs flowsheet flowsheet   Resp Resp Rate: 16 (03/13/23 1200) 16 Cardiac flowsheet flowsheet   Temp Temp: 98 °F (36.7 °C) (03/13/23 1200) 98.1 Skin flowsheet flowsheet   Weight  na na Edema flowsheet flowsheet   Tele status bedside bedside Pain Pain Intensity 1: 8 (03/13/23 0811) flowsheet     Orders   Duration: Start: 1200 End: 1530 Total: 3.5   Dialyzer: Dialyzer/Set Up Inspection: Revaclear (03/13/23 1200)   K Bath: Dialysate K (mEq/L): 3 (03/13/23 1200)   Ca Bath: Dialysate CA (mEq/L): 2.5 (03/13/23 1200)   Na: Dialysate NA (mEq/L): 138 (03/13/23 1200)   Bicarb: Dialysate HCO3 (mEq/L): 35 (03/13/23 1200)   Target Fluid Removal: Goal/Amount of Fluid to Remove (mL): 3500 mL (03/13/23 1200)     Access   Type & Location: [facility] AVG: Pre-assessment: skin CDI. No s/s of infection. + B/T. No issues with cannulation.  AVG cleaned and accessed per p&p   Comments:                                        Labs   HBsAg (Antigen) / date:2/15/23 negative   HBsAb (Antibody) / date: unknown   Source: cc   Obtained/Reviewed  Critical Results Called HGB   Date Value Ref Range Status   03/10/2023 11.2 (L) 12.1 - 17.0 g/dL Final     Potassium   Date Value Ref Range Status   03/10/2023 3.8 3.5 - 5.1 mmol/L Final     Calcium   Date Value Ref Range Status   03/10/2023 7.9 (L) 8.5 - 10.1 MG/DL Final     BUN   Date Value Ref Range Status   03/10/2023 47 (H) 6 - 20 MG/DL Final     Comment:     INVESTIGATED PER DELTA CHECK PROTOCOL     Creatinine   Date Value Ref Range Status   03/10/2023 5.71 (H) 0.70 - 1.30 MG/DL Final     Comment:     INVESTIGATED PER DELTA CHECK PROTOCOL        Meds Given   Name Dose Route                    Adequacy / Fluid    Total Liters Process: 73.2   Net Fluid Removed: 3000mL: - 30 minutes of hypotension      Comments   Time Out Done: (Time) 1158   Admitting Diagnosis: Acute on chronic respiratory failure with hypoxia ([facility] [address]   Consent obtained/signed: Informed Consent Verified: Yes (03/13/23 1200)   Machine / RO # Machine Number: [id number] (03/13/23 1200)   Primary Nurse Rpt Pre: Irvin Cardozo RN   Primary Nurse Rpt Post: Irvin Cardozo RN   Pt Education: Procedural   Care Plan: HD POC   Pts outpatient clinic: [facility]     Tx Summary   Comments:                         1200: Treatment initiated  3648: Tx ended. VSS. All possible blood returned to patient. Hemostasis achieved without issue. Bed locked and in the lowest position, call bell and belongings in reach. SBAR given to Primary, RN. Patient is stable at time of my departure. All Dialysis related medications have been reviewed.

## 2023-06-07 NOTE — REMOTE MONITORING
Attempted to contact primary RN in regards to the sepsis bundle.     Gurjit Rome RN, BSN  Sepsis [facility] center  4  Xeljanz Counseling: I discussed with the patient the risks of Xeljanz therapy including increased risk of infection, liver issues, headache, diarrhea, or cold symptoms. Live vaccines should be avoided. They were instructed to call if they have any problems.

## 2024-02-17 NOTE — PROGRESS NOTES
Problem: Infection - Risk of, Central Venous Catheter-Associated Bloodstream Infection  Goal: *Absence of infection signs and symptoms  Outcome: Progressing Towards Goal     Problem: Patient Education: Go to Patient Education Activity  Goal: Patient/Family Education  Outcome: Progressing Towards Goal Nutrition, metabolism, and development symptoms HTN (hypertension)

## 2024-03-08 NOTE — ED PROVIDER NOTES
EMERGENCY DEPARTMENT HISTORY AND PHYSICAL EXAM           Date: 8/9/2022  Patient Name: Len Sal  Patient Age and Sex: 46 y.o. male  MRN:  [id number]  [facility]:  [id number]    History of Presenting Illness     Chief Complaint   Patient presents with    Leg Swelling     Pt reports new onset swelling throughout body, most noticeable in bilateral legs, pt reports swelling is painful, states he has stage 3 kidney disease       History Provided By: Patient    Ability to gather history was limited by:     HPI: Len Sal, 46 y.o. male with type 1 diabetes, bipolar disorder, stage III CKD, complains of worsening bilateral lower extremity edema for the past 1 week. Throbbing and burning pain in the bilateral legs due to edema, moderate severity. Mild shortness of breath. Taking medications including furosemide as prescribed. He is unsure whether or not he has a nephrologist.    Location:    Quality:      Severity:    Duration:   Timing:      Context:    Modifying factors:   Associated symptoms:     Past History     The patient's medical, surgical, and social history on file were reviewed by me today.     The family history was reviewed by me today and was non-contributory, unless otherwise specified below:    Past Medical History:  Past Medical History:   Diagnosis Date    Bipolar 1 disorder, depressed ([address]     Bipolar disorder ([address]     Chronic kidney disease, stage 3a ([address]     Depression     Diabetes ([address]     DKA, type 1 ([address] 1/27/2013    diagnosed age 23    DKA, type 1 ([address]     H/O noncompliance with medical treatment, presenting hazards to health     MRSA (methicillin resistant staph aureus) culture positive     MRSA (methicillin resistant Staphylococcus aureus)     Face    Noncompliance with medication regimen     Second hand smoke exposure     Seizure ([address]     Seizures ([address] 2006 or 2007    one episode during [facility]       Past Surgical History:  Past Surgical History:   Procedure Laterality Date HX HEENT      top left wisdom tooth    HX ORTHOPAEDIC Left     wrist; [facility]    UPPER GI ENDOSCOPY,BIOPSY  2018            Family History:  Family History   Problem Relation Age of Onset    Diabetes Mother     Diabetes Other         neice, type 1        Social History:  Social History     Tobacco Use    Smoking status: Former     Packs/day: 0.10     Years: 16.00     Pack years: 1.60     Types: Cigarettes     Start date: 10/4/1999     Quit date: 2020     Years since quittin.4    Smokeless tobacco: Never   Vaping Use    Vaping Use: Never used   Substance Use Topics    Alcohol use: No    Drug use: No       Current Medications:  No current facility-administered medications on file prior to encounter. Current Outpatient Medications on File Prior to Encounter   Medication Sig Dispense Refill    furosemide (LASIX) 40 mg tablet Take 1 Tablet by mouth in the morning for 90 days. Take  by mouth daily. 90 Tablet 0    finasteride (PROSCAR) 5 mg tablet Take 1 Tablet by mouth in the morning. 30 Tablet 2    insulin aspart U-100 (NovoLOG U-100 Insulin aspart) 100 unit/mL injection Use as instructed via insulin pump. Dx code E10.65 max daily dose 50U 30 mL 2    rosuvastatin (CRESTOR) 40 mg tablet       pregabalin (Lyrica) 75 mg capsule Take 1 Capsule by mouth two (2) times a day. Max Daily Amount: 150 mg. 60 Capsule 2    Walker (Ultra-Light Rollator) misc Use while ambulating 1 Each 0    amLODIPine (NORVASC) 10 mg tablet Take 1 Tablet by mouth daily. 90 Tablet 1    cloNIDine (CATAPRES) 0.1 mg/24 hr ptwk 1 Patch by TransDERmal route every seven (7) days. 12 Patch 1    ondansetron (ZOFRAN ODT) 4 mg disintegrating tablet Take 1 Tablet by mouth every eight (8) hours as needed for Nausea or Vomiting for up to 30 days. 90 Tablet 0    pantoprazole (PROTONIX) 40 mg tablet Take 1 Tablet by mouth Before breakfast and dinner for 30 days.  Indications: inflammation of the esophagus with erosion 60 Tablet 0    acetaminophen (TYLENOL) 325 mg tablet Take 2 Tablets by mouth every four (4) hours as needed for Pain or Fever for up to 30 days. 180 Tablet 0    ferrous sulfate 325 mg (65 mg iron) tablet Take 1 Tablet by mouth daily (with breakfast) for 30 days. 30 Tablet 0    Dexcom G6  misc Use with the dexcom device to check blood sugars 4 times a day 1 Each 0    DULoxetine (CYMBALTA) 60 mg capsule Take 1 capsule by mouth once daily 90 Capsule 0    Dexcom G6 Sensor brandi Use as directed every 10 days 10 Each 11    Dexcom G6 Transmitter brandi Use as directed 10 Each 3    FreeStyle Connor 14 Day Sensor kit Use as directed every 14 days 2 Kit 2    Insulin Needles, Disposable, 31 gauge x 5/16\" ndle Dx code E11.65, use 6x daily 200 Each 11    insulin glargine (LANTUS) 100 unit/mL injection 16 Units by SubCUTAneous route daily. 1 mL 0    insulin aspart U-100 (NovoLOG Flexpen U-100 Insulin) 100 unit/mL (3 mL) inpn Take 1 unit for every 15 grams of carbohydrates, 1 unit for every 50 points >150. Max daily dose 25U. 15 mL 3    rosuvastatin (CRESTOR) 20 mg tablet Take 1 Tablet by mouth nightly. (Patient not taking: Reported on 7/14/2022) 90 Tablet 0    carvediloL (COREG) 12.5 mg tablet Take 1 Tablet by mouth two (2) times daily (with meals). 60 Tablet 1    tamsulosin (FLOMAX) 0.4 mg capsule Take 0.4 mg by mouth daily. pen needle, diabetic 30 gauge x 3/16\" ndle 5 Units by Does Not Apply route Before breakfast, lunch, and dinner. 100 Each 3    ergocalciferol (ERGOCALCIFEROL) 1,250 mcg (50,000 unit) capsule Take 1 capsule by mouth once a week (Patient not taking: Reported on 5/10/2022) 12 Capsule 0       Allergies:  No Known Allergies  Review of Systems   A complete ROS was reviewed by me today and was negative, unless otherwise specified below:    Review of Systems   Constitutional:  Positive for fatigue. Negative for fever. Respiratory:  Positive for shortness of breath. Cardiovascular:  Positive for leg swelling.  Negative for chest pain.   Gastrointestinal:  Negative for abdominal pain. All other systems reviewed and are negative. Physical Exam   Vital Signs  Patient Vitals for the past 8 hrs:   Temp Pulse Resp BP SpO2   08/09/22 1840 -- 81 -- (!) 163/96 --   08/09/22 1730 -- 81 20 (!) 159/90 95 %   08/09/22 1524 97.5 °F (36.4 °C) 79 18 (!) 156/96 99 %          Physical Exam  Vitals and nursing note reviewed. Constitutional:       General: He is not in acute distress. Appearance: Normal appearance. He is well-developed. He is not ill-appearing. HENT:      Head: Normocephalic and atraumatic. Eyes:      General:         Right eye: No discharge. Left eye: No discharge. Conjunctiva/sclera: Conjunctivae normal.   Cardiovascular:      Rate and Rhythm: Normal rate and regular rhythm. Heart sounds: Normal heart sounds. No murmur heard. Pulmonary:      Effort: Pulmonary effort is normal. No respiratory distress. Breath sounds: Normal breath sounds. No wheezing. Abdominal:      General: There is no distension. Palpations: Abdomen is soft. Tenderness: There is no abdominal tenderness. Musculoskeletal:         General: No deformity. Normal range of motion. Cervical back: Normal range of motion and neck supple. Right lower leg: Edema present. Left lower leg: Edema present. Skin:     General: Skin is warm and dry. Findings: No rash. Neurological:      General: No focal deficit present. Mental Status: He is alert and oriented to person, place, and time. Psychiatric:         Mood and Affect: Mood normal.         Behavior: Behavior normal.         Thought Content:  Thought content normal.       Diagnostic Study Results   Labs  Recent Results (from the past 24 hour(s))   URINALYSIS W/ REFLEX CULTURE    Collection Time: 08/09/22  2:01 PM    Specimen: Urine   Result Value Ref Range    Color YELLOW/STRAW      Appearance CLEAR CLEAR      Specific gravity 1.012      pH (UA) 6.5 5.0 - 8.0      Protein 300 (A) NEG mg/dL    Glucose 250 (A) NEG mg/dL    Ketone Negative NEG mg/dL    Bilirubin Negative NEG      Blood SMALL (A) NEG      Urobilinogen 0.2 0.2 - 1.0 EU/dL    Nitrites Negative NEG      Leukocyte Esterase Negative NEG      WBC 5-10 0 - 4 /hpf    RBC 0-5 0 - 5 /hpf    Epithelial cells FEW FEW /lpf    Bacteria 1+ (A) NEG /hpf    UA:UC IF INDICATED CULTURE NOT INDICATED BY UA RESULT CNI      Mucus TRACE (A) NEG /lpf    Granular cast 0-2 (A) NEG /lpf    Waxy cast 0-2 (A) NEG /lpf   EKG, 12 LEAD, INITIAL    Collection Time: 08/09/22  2:10 PM   Result Value Ref Range    Ventricular Rate 82 BPM    Atrial Rate 82 BPM    P-R Interval 166 ms    QRS Duration 94 ms    Q-T Interval 406 ms    QTC Calculation (Bezet) 474 ms    Calculated P Axis 66 degrees    Calculated R Axis 54 degrees    Calculated T Axis 78 degrees    Diagnosis       Normal sinus rhythm  Normal ECG  When compared with ECG of 25-JUN-2022 06:48,  No significant change was found  Confirmed by Carly Ashley ([id number]) on 8/9/2022 4:03:52 PM     CBC WITH AUTOMATED DIFF    Collection Time: 08/09/22  2:13 PM   Result Value Ref Range    WBC 6.2 4.1 - 11.1 K/uL    RBC 3.60 (L) 4.10 - 5.70 M/uL    HGB 10.4 (L) 12.1 - 17.0 g/dL    HCT 31.8 (L) 36.6 - 50.3 %    MCV 88.3 80.0 - 99.0 FL    MCH 28.9 26.0 - 34.0 PG    MCHC 32.7 30.0 - 36.5 g/dL    RDW 15.6 (H) 11.5 - 14.5 %    PLATELET 399 (H) 304 - 400 K/uL    MPV 10.1 8.9 - 12.9 FL    NRBC 0.0 0  WBC    ABSOLUTE NRBC 0.00 0.00 - 0.01 K/uL    NEUTROPHILS 58 32 - 75 %    LYMPHOCYTES 25 12 - 49 %    MONOCYTES 8 5 - 13 %    EOSINOPHILS 7 0 - 7 %    BASOPHILS 1 0 - 1 %    IMMATURE GRANULOCYTES 1 (H) 0.0 - 0.5 %    ABS. NEUTROPHILS 3.6 1.8 - 8.0 K/UL    ABS. LYMPHOCYTES 1.5 0.8 - 3.5 K/UL    ABS. MONOCYTES 0.5 0.0 - 1.0 K/UL    ABS. EOSINOPHILS 0.5 (H) 0.0 - 0.4 K/UL    ABS. BASOPHILS 0.1 0.0 - 0.1 K/UL    ABS. IMM.  GRANS. 0.0 0.00 - 0.04 K/UL    DF AUTOMATED     METABOLIC PANEL, COMPREHENSIVE Please call Dr. Weston's office to schedule your follow up appointment Collection Time: 08/09/22  2:13 PM   Result Value Ref Range    Sodium 139 136 - 145 mmol/L    Potassium 4.0 3.5 - 5.1 mmol/L    Chloride 109 (H) 97 - 108 mmol/L    CO2 22 21 - 32 mmol/L    Anion gap 8 5 - 15 mmol/L    Glucose 126 (H) 65 - 100 mg/dL    BUN 44 (H) 6 - 20 MG/DL    Creatinine 4.24 (H) 0.70 - 1.30 MG/DL    BUN/Creatinine ratio 10 (L) 12 - 20      GFR est AA 19 (L) >60 ml/min/1.73m2    GFR est non-AA 16 (L) >60 ml/min/1.73m2    Calcium 8.8 8.5 - 10.1 MG/DL    Bilirubin, total 0.2 0.2 - 1.0 MG/DL    ALT (SGPT) 34 12 - 78 U/L    AST (SGOT) 27 15 - 37 U/L    Alk. phosphatase 178 (H) 45 - 117 U/L    Protein, total 6.7 6.4 - 8.2 g/dL    Albumin 2.1 (L) 3.5 - 5.0 g/dL    Globulin 4.6 (H) 2.0 - 4.0 g/dL    A-G Ratio 0.5 (L) 1.1 - 2.2     TROPONIN-HIGH SENSITIVITY    Collection Time: 08/09/22  2:13 PM   Result Value Ref Range    Troponin-High Sensitivity 13 0 - 76 ng/L   NT-PRO BNP    Collection Time: 08/09/22  2:13 PM   Result Value Ref Range    NT pro-BNP 4,600 (H) <125 PG/ML       Radiologic Studies  XR CHEST PORT   Final Result   Tiny bilateral pleural effusions        CT Results  (Last 48 hours)      None          CXR Results  (Last 48 hours)                 08/09/22 1404  XR CHEST PORT Final result    Impression:  Tiny bilateral pleural effusions       Narrative:  EXAM: XR CHEST PORT       INDICATION: Shortness of breath       COMPARISON: None. FINDINGS: A portable AP radiograph of the chest was obtained at 1402 hours. There is blunting of both lateral costophrenic angles. The cardiac and   mediastinal contours and pulmonary vascularity are normal.  The bones and soft   tissues are grossly within normal limits.                     Billable Procedures   EKG reviewed by ED Physician in the absence of a cardiologist: Yes  EKG below was interpreted by Lala Ramírez MD    EKG    Date/Time: 8/9/2022 6:30 PM  Performed by: Burgess Lynne MD  Authorized by: Burgess Lynne MD     ECG reviewed by ED Physician in the absence of a cardiologist: yes    Interpretation:     Interpretation: non-specific    Rate:     ECG rate assessment: normal    Rhythm:     Rhythm: sinus rhythm    Ectopy:     Ectopy: none    QRS:     QRS axis:  Normal  ST segments:     ST segments:  Normal  T waves:     T waves: normal      Medical Decision Making     I reviewed the patient's most recent Emergency Dept notes and diagnostic tests in formulating my MDM on today's visit. Provider Notes (Medical Decision Making):   44-year-old male with type 1 diabetes, bipolar disorder, CKD, complaining of worsening lower extremity edema and shortness of breath for at least a week. Has 3+ BLE. Lungs clear, no O2 requirement. Worsening creatinine, now up to 4.2 and worsening peripheral edema. Pt likely heading toward dialysis soon. He put out approximately 600 mL of light yellow urine after Lasix 60 mg IV. I have instructed him to increase his Lasix to 40 mg twice daily and follow-up closely in the outpatient setting with his nephrologist.  He does not need to be hospitalized today for his rising creatinine. Records reviewed: I reviewed his prior records including his admission from 2022 (1 month ago) when he was seen by nephrology for stage IV CKD. He did not follow-up yet with nephrology.     Mack Lara MD  6:27 PM  2022       Social History     Tobacco Use    Smoking status: Former     Packs/day: 0.10     Years: 16.00     Pack years: 1.60     Types: Cigarettes     Start date: 10/4/1999     Quit date: 2020     Years since quittin.4    Smokeless tobacco: Never   Vaping Use    Vaping Use: Never used   Substance Use Topics    Alcohol use: No    Drug use: No       Medications Administered during ED course:  Medications   furosemide (LASIX) injection 60 mg (60 mg IntraVENous Given 22)   morphine injection 4 mg (4 mg IntraVENous Given 22)          Prescriptions from today's ED visit:  Discharge Medication List as of 8/9/2022  8:25 PM         Diagnosis and Disposition     Disposition:  Discharged    Clinical Impression:   1. Peripheral edema    2. CKD (chronic kidney disease) stage 4, GFR 15-29 ml/min ([facility])        Attestation:  I personally performed the services described in this documentation on this date 8/9/2022 for patient Sanju Sal. Michelet Arevalo MD        I was the first provider for this patient on this visit. To the best of my ability I reviewed relevant prior medical records, electrocardiograms, laboratories, and radiologic studies. The patient's presenting problems were discussed, and the patient was in agreement with the care plan formulated and outlined with them. Michelet Arevalo MD    Please note that this dictation was completed with Dragon voice recognition software. Quite often unanticipated grammatical, syntax, homophones, and other interpretive errors are inadvertently transcribed by the computer software. Please disregard these errors and excuse any errors that have escaped final proofreading.

## 2024-04-08 NOTE — PROGRESS NOTES
Attempted to call patient to schedule mohs procedure for the biopsy proven basal cell carcinoma located on the left nasal bridge and Electrodesiccation and Curettage of biopsy proven basal cell carcinoma located on the left lower back.    Left message on voicemail for patient to return call to the clinic.    Referred by Dr Seaman.    Nurse sheet started in [facility].    Yuan Burnett DO P Knabel, P Der Nurse Msg Pool  Caller: Unspecified (4 days ago,  9:02 AM)  No consult. Mohs. Flap. [id number], [id number], [id number]    EDC - back. Malignant destruction 1.0-2.0 cm [id number](?)   Mr. Shay Salinas presents as a new patient for evaluation of frequent falls and back pain. Patient was referred by PCP. Depression screening done on this patient.

## 2024-05-17 NOTE — TELEPHONE ENCOUNTER
Sent patient a my chart message that her Carotid ultrasound showed moderate blockages in the bilateral carotid arteries.  No intervention needed.  We will monitor it.  But also we would like to start her on aspirin 81 mg daily along with medications for cholesterol( Crestor 20 mg q.h.s.) to help this blockage and make it stable and decrease chance for worsening. Prescriptions were sent in to be picked up from [facility].            ----- Message from Demetrius Jones MD sent at 5/17/2024  2:57 PM CDT -----  Please call the patient with the results.  Carotid ultrasound showed moderate blockages in the bilateral carotid arteries.  No intervention needed.  We will monitor it.  But also we would like to start her on aspirin 81 mg daily along with medication  s for cholesterol( Crestor 20 mg q.h.s.) to help this blockage and make it stable and decrease chance for worsening.   Sincerely,  Demetrius Jones MD.   Interventional Cardiologist  [facility], [place]      Spoke with pt and asked what mobil brand was he currently using as well to make him aware that depending on his phone, he may be able to  use his phone to in replace on the . He is currently using a  [employer] which is not compatible with the dexcom system.

## 2024-10-09 NOTE — PROGRESS NOTES
PRE-SEDATION ASSESSMENT    CONSENT  Risks, benefits, and alternatives have been discussed with the patient/patient representative, and patient/patient representative agrees to proceed: Yes    MEDICAL HISTORY   Continues to have pain and tenderness at the PSIS.      PHYSICAL EXAM  History and Physical Reviewed: H&P completed today  Airway Anatomy : Class III  Heart : Normal  Lungs : Normal  LOC/Mental Status : Normal    OTHER FINDINGS  Reviewed current medications and allergies: Yes  Pertinent lab/diagnostic test reviewed: Yes    SEDATION RISK ASSESSMENT  Risk Status ASA: Class II - Normal patient with mild systemic disease  Plan for Sedation: Moderate Sedation  EKG Monitoring: No    Diagnosis/procedure: sacroiliac joint pain/injection.    NARRATIVE FINDINGS      Report given to Enrico Razo by Silvia KAMARA. SBAR, Kardex, ED Summary, Procedure Summary, Intake/Output, MAR, Accordion, Recent Results or Alarm Parameters  was discussed. Katy Whatley RN assumed care of the pt. 0730: Pt received c/o of a headache and nausea. Pt vomited. PRN zofran and morphine given. 8465: pt states he is feeling better after prn meds. Vanc troph and CBC drawn. Antibiotics hanging. Will continue to monitor     1110: Pt feeling nauseous again and states pain medication has just about worn off. PRN morphine given. Zofran not available until 1pm.     1145: Pt states the morphine didn't help as much. PRN percocet given. 1420: Wound care done.

## 2024-11-29 NOTE — CONSULTS
Nephrology Consult Note     Sterling Pritchard                Phone - [phone]   Patient: Sanju Sal   YOB: 1980    Date- 9/6/2022  MRN: [id number]             REASON FOR CONSULTATION: FELECIA on CKD  CONSULTING PHYSICIAN: Dr Randi Sosa MD     IMPRESSION & PLAN:   FELECIA versus progressive CKD stage IV  Volume overload  Acute hypoxic respiratory failure  Chest pain  Urine retention  Type 1 diabetes  Hypertension  Anemia    PLAN-  Will DC Lasix and start on Bumex 2 mg 3 times daily  Monitor urine output very closely  If he continues to be hypervolemic or demonstrate worsening FELECIA and he will probably need to initiate on hemodialysis. Patient has consented and is agreeable for hemodialysis. Will start on Epogen for anemia  Check daily labs  Thank you for the consult     Active Problems:    Respiratory failure ([address] (9/6/2022)        [x] High complexity decision making was performed  [x] Patient is at high-risk of decompensation with multiple organ involvement    Subjective:   HPI: Sanju Sal is a 46 y.o.  male  with PMH significant for  CKD stage 4 with  a baseline creatinine of 2.8-3.5 also history of type 1 diabetes, bipolar disorder who presented to the ED with complaints LLQ pain,SOB, PND and orthopnea he was recently admitted at [facility] in August for  DKA and acidosis along with FELECIA. He was admitted to ICU was placed on mechanical ventilation and was placed on DKA protocol. He received  IV fluids with boluses. He was on IV antibiotics for pneumonia. He was significantly acidotic and had ongoing FELECIA. He had ECHO done that showed normal EF with normal diastolic function. He was DC home on lasix 80 mg BID. His Cr on DC was 4.4-4.5. Renal Consult is requested for eval of Progressive CKD and hypervolemia      Review of Systems:       A 11 point review of system was performed today.  Pertinent positives and negatives are mentioned in the HPI. The reminder of the ROS is negative and noncontributory. Past Medical History:   Diagnosis Date    Bipolar 1 disorder, depressed ([address]     Bipolar disorder ([address]     Chronic kidney disease, stage 3a ([address]     Depression     Diabetes ([address]     DKA, type 1 ([address] 1/27/2013    diagnosed age 23    DKA, type 1 ([address]     H/O noncompliance with medical treatment, presenting hazards to health     MRSA (methicillin resistant staph aureus) culture positive     MRSA (methicillin resistant Staphylococcus aureus)     Face    Noncompliance with medication regimen     Second hand smoke exposure     Seizure ([address]     Seizures ([address] 2006 or 2007    one episode during prison      Past Surgical History:   Procedure Laterality Date    HX HEENT      top left wisdom tooth    HX ORTHOPAEDIC Left     wrist; MCV    UPPER GI ENDOSCOPY,BIOPSY  11/20/2018           Prior to Admission medications    Medication Sig Start Date End Date Taking? Authorizing Provider   acetaminophen-codeine (Tylenol-Codeine #3) 300-30 mg per tablet Take 1 Tablet by mouth every six (6) hours as needed for Pain for up to 30 days. Max Daily Amount: 4 Tablets. 8/25/22 9/24/22  Angelica Cuevas MD   bacitracin zinc (BACITRACIN) ointment Apply  to affected area two (2) times a day. 8/25/22   Angelica Cuevas MD   insulin glargine (Lantus Solostar U-100 Insulin) 100 unit/mL (3 mL) inpn 16 Units by SubCUTAneous route as needed (pump malfunction). 8/23/22   Xiomara Ugarte MD   Ketone Blood Test strp 50 Each by Does Not Apply route as needed for PRN Reason (Other) (as needed for suspected dka). 8/23/22   Xiomara Ugarte MD   pantoprazole (PROTONIX) 40 mg tablet Take 1 Tablet by mouth Daily (before breakfast). 8/21/22   Susy Macias MD   sodium bicarbonate 650 mg tablet Take 1 Tablet by mouth three (3) times daily for 30 days.  8/20/22 9/19/22  Susy Macias MD   furosemide (Lasix) 80 mg tablet Take 1 Tablet by mouth two (2) times a day for 30 days. 8/20/22 9/19/22  Roger Hall MD   hydrALAZINE (APRESOLINE) 100 mg tablet Take 1 Tablet by mouth three (3) times daily for 30 days. 8/20/22 9/19/22  Roger Hall MD   finasteride (PROSCAR) 5 mg tablet Take 1 Tablet by mouth in the morning. 7/25/22   Lorenzo Fuchs MD   insulin aspart U-100 (NovoLOG U-100 Insulin aspart) 100 unit/mL injection Use as instructed via insulin pump. Dx code E10.65 max daily dose 50U 7/15/22   Medhat Salazar MD   rosuvastatin (CRESTOR) 40 mg tablet  6/21/22   Provider, Historical   pregabalin (Lyrica) 75 mg capsule Take 1 Capsule by mouth two (2) times a day. Max Daily Amount: 150 mg. 7/14/22   Lorenzo Fuchs MD   Oni Sample (Ultra-Light Rollator) misc Use while ambulating 7/14/22   Lorenzo Fuchs MD   amLODIPine (NORVASC) 10 mg tablet Take 1 Tablet by mouth daily. 7/14/22   Lorenzo Fuchs MD   cloNIDine (CATAPRES) 0.1 mg/24 hr ptwk 1 Patch by TransDERmal route every seven (7) days. 7/14/22   Lorenzo Fuchs MD   Dexcom G6  misc Use with the dexcom device to check blood sugars 4 times a day 7/1/22   Medhat Salazar MD   DULoxetine (CYMBALTA) 60 mg capsule Take 1 capsule by mouth once daily 6/22/22   Lorenzo Fuchs MD   Dexcom G6 Sensor brandi Use as directed every 10 days 6/21/22   Medhat Salazar MD   Dexcom G6 Transmitter brandi Use as directed 6/21/22   Medhat Salazar MD   FreeStyle Connor 14 Day Sensor kit Use as directed every 14 days  Patient not taking: No sig reported 6/21/22   Medhat Salazar MD   Insulin Needles, Disposable, 31 gauge x 5/16\" ndle Dx code E11.65, use 6x daily 6/21/22   Medhat Salazar MD   insulin glargine (LANTUS) 100 unit/mL injection 16 Units by SubCUTAneous route daily. 6/21/22   Medhat Salazar MD   insulin aspart U-100 (NovoLOG Flexpen U-100 Insulin) 100 unit/mL (3 mL) inpn Take 1 unit for every 15 grams of carbohydrates, 1 unit for every 50 points >150.  Max daily dose 25U. 6/21/22   Medhat Salazar MD   carvediloL ([address]) 12.5 mg tablet Take 1 Tablet by mouth two (2) times daily (with meals). 22   Vlad Ballard MD   tamsulosin (FLOMAX) 0.4 mg capsule Take 0.4 mg by mouth daily. Provider, Historical   pen needle, diabetic 30 gauge x 3/16\" ndle 5 Units by Does Not Apply route Before breakfast, lunch, and dinner. 22   Vlad Ballard MD     No Known Allergies   Social History     Tobacco Use    Smoking status: Former     Packs/day: 0.10     Years: 16.00     Pack years: 1.60     Types: Cigarettes     Start date: 10/4/1999     Quit date: 2020     Years since quittin.5    Smokeless tobacco: Never   Substance Use Topics    Alcohol use: No      Family History   Problem Relation Age of Onset    Diabetes Mother     Diabetes Other         neice, type 1         Objective:    Patient Vitals for the past 24 hrs:   Temp Pulse Resp BP SpO2   22 1056 -- 75 -- 127/83 --   22 0956 -- -- -- -- 92 %   22 0942 -- 79 21 138/80 90 %   22 0842 97.7 °F (36.5 °C) 84 16 (!) 149/81 92 %     No intake/output data recorded.   Last 3 Recorded Weights in this Encounter    22 0842   Weight: 81.6 kg (180 lb)      Physical Exam:  General:Alert, No distress,   Eyes:No scleral icterus, No conjunctival pallor  Neck:Supple,no mass palpable,no thyromegaly  Lungs:Clears to auscultation Bilaterally, normal respiratory effort  CVS:RRR, S1 S2 normal,  No rub,  Abdomen:Soft, Non tender, No hepatosplenomegaly  Extremities: 2+ LE edema  Skin:No rash or lesions, Warm and DRY   Psych: appropriate affect    :  hansen cath  Musculoskeletal : no redness, no joint tenderness  NEURO: Normal Speech, Non focal deficit       CODE STATUS:  full  Care Plan discussed with:  patient     Chart reviewed.    y Reviewed previous records   y Discussion with patient and/or family and questions answered       ECG[de-identified] Rev:yes  Xray/CT/US/MRI REV:yes  Lab Data Personally Reviewed: (see below)  Recent Labs     22  0916   WBC 11.9*   HGB You can access the FollowMyHealth Patient Portal offered by [facility] by registering at the following website: [email or web address]. By joining [employer]’s FollowMyHealth portal, you will also be able to view your health information using other applications (apps) compatible with our system. 8.9*      ANEU 9.1*      K 4.1   *   BUN 69*   CREA 5.56*   ALT 47   TBILI 0.3   *   CA 8.6     Lab Results   Component Value Date/Time    Color YELLOW/STRAW 08/12/2022 12:40 AM    Appearance CLEAR 08/12/2022 12:40 AM    Specific gravity 1.017 08/12/2022 12:40 AM    Specific gravity 1.020 05/08/2022 09:06 PM    pH (UA) 5.5 08/12/2022 12:40 AM    Protein 300 (A) 08/12/2022 12:40 AM    Glucose >1,000 (A) 08/12/2022 12:40 AM    Ketone 15 (A) 08/12/2022 12:40 AM    Bilirubin Negative 08/12/2022 12:40 AM    Urobilinogen 0.2 08/12/2022 12:40 AM    Nitrites Negative 08/12/2022 12:40 AM    Leukocyte Esterase Negative 08/12/2022 12:40 AM    Epithelial cells FEW 08/12/2022 12:40 AM    Bacteria Negative 08/12/2022 12:40 AM    WBC 5-10 08/12/2022 12:40 AM    RBC 5-10 08/12/2022 12:40 AM       Lab Results   Component Value Date/Time    Iron 120 06/02/2022 11:54 AM    TIBC 227 (L) 06/02/2022 11:54 AM    Iron % saturation 53 (H) 06/02/2022 11:54 AM    Ferritin 61 06/02/2022 11:54 AM     Lab Results   Component Value Date/Time    Culture result: MRSA NOT PRESENT 08/12/2022 02:46 AM    Culture result:  08/12/2022 02:46 AM     Screening of patient nares for MRSA is for surveillance purposes and, if positive, to facilitate isolation considerations in high risk settings. It is not intended for automatic decolonization interventions per se as regimens are not sufficiently effective to warrant routine use. Culture result: NO GROWTH 6 DAYS 08/11/2022 11:20 PM     Prior to Admission Medications   Prescriptions Last Dose Informant Patient Reported? Taking?    DULoxetine (CYMBALTA) 60 mg capsule   No No   Sig: Take 1 capsule by mouth once daily   Dexcom G6  misc   No No   Sig: Use with the dexcom device to check blood sugars 4 times a day   Dexcom G6 Sensor brandi   No No   Sig: Use as directed every 10 days   Dexcom G6 Transmitter brandi   No No   Sig: Use as directed   Cristian Corral 14 Day Sensor kit No No   Sig: Use as directed every 14 days   Patient not taking: No sig reported   Insulin Needles, Disposable, 31 gauge x \" ndle   No No   Sig: Dx code E11.65, use 6x daily   Ketone Blood Test strp   No No   Si Each by Does Not Apply route as needed for PRN Reason (Other) (as needed for suspected dka). Walker (Ultra-Light Rollator) misc   No No   Sig: Use while ambulating   acetaminophen-codeine (Tylenol-Codeine #3) 300-30 mg per tablet   No No   Sig: Take 1 Tablet by mouth every six (6) hours as needed for Pain for up to 30 days. Max Daily Amount: 4 Tablets. amLODIPine (NORVASC) 10 mg tablet   No No   Sig: Take 1 Tablet by mouth daily. bacitracin zinc (BACITRACIN) ointment   No No   Sig: Apply  to affected area two (2) times a day. carvediloL (COREG) 12.5 mg tablet   No No   Sig: Take 1 Tablet by mouth two (2) times daily (with meals). cloNIDine (CATAPRES) 0.1 mg/24 hr ptwk   No No   Si Patch by TransDERmal route every seven (7) days. finasteride (PROSCAR) 5 mg tablet   No No   Sig: Take 1 Tablet by mouth in the morning. furosemide (Lasix) 80 mg tablet   No No   Sig: Take 1 Tablet by mouth two (2) times a day for 30 days. hydrALAZINE (APRESOLINE) 100 mg tablet   No No   Sig: Take 1 Tablet by mouth three (3) times daily for 30 days. insulin aspart U-100 (NovoLOG Flexpen U-100 Insulin) 100 unit/mL (3 mL) inpn   No No   Sig: Take 1 unit for every 15 grams of carbohydrates, 1 unit for every 50 points >150. Max daily dose 25U.   insulin aspart U-100 (NovoLOG U-100 Insulin aspart) 100 unit/mL injection   No No   Sig: Use as instructed via insulin pump. Dx code E10.65 max daily dose 50U   insulin glargine (LANTUS) 100 unit/mL injection   No No   Si Units by SubCUTAneous route daily. insulin glargine (Lantus Solostar U-100 Insulin) 100 unit/mL (3 mL) inpn   No No   Si Units by SubCUTAneous route as needed (pump malfunction).    pantoprazole (PROTONIX) 40 mg tablet   No No   Sig: Take 1 Tablet by mouth Daily (before breakfast). pen needle, diabetic 30 gauge x 3/16\" ndle   No No   Si Units by Does Not Apply route Before breakfast, lunch, and dinner. pregabalin (Lyrica) 75 mg capsule   No No   Sig: Take 1 Capsule by mouth two (2) times a day. Max Daily Amount: 150 mg.   rosuvastatin (CRESTOR) 40 mg tablet   Yes No   sodium bicarbonate 650 mg tablet   No No   Sig: Take 1 Tablet by mouth three (3) times daily for 30 days. tamsulosin (FLOMAX) 0.4 mg capsule   Yes No   Sig: Take 0.4 mg by mouth daily. Facility-Administered Medications: None     Imaging:    Medications list Personally Reviewed   [x]      Yes     []               No    Thank you for allowing us to participate in the care this patient. We will follow patient with you.   Signed By: Josette Traore [facility]  [facility] VIVIANA [address], Gerda Dancer  [address], [address] [place]  Phone - [phone]         Fax - [phone] [facility] Office  [address] [address]  Phone - [phone]        Fax - [phone]

## 2025-02-24 NOTE — DIABETES MGMT
[address] [facility]    CLINICAL NURSE SPECIALIST CONSULT     Initial Presentation   Claryce Goltz is a 46 y.o. male admitted 3/08/2022 with severe nausea, vomiting, abdominal pain, weakness over the last 1 week. Patient reports blood sugars have been running in the 3-400 range. He reports taking both his insulin and Lantus as prescribed. He was seen about a week ago and had hyperglycemia without evidence of DKA and was discharged to home. Admission glucose 601. Anion gap 25. Creatine 1.94. GFR 39. A1c 11.4%    HX:   Past Medical History:   Diagnosis Date    Bipolar 1 disorder, depressed ([address]     Bipolar disorder ([address]     Depression     Diabetes ([address]     DKA, type 1 ([address] 1/27/2013    diagnosed age 23    H/O noncompliance with medical treatment, presenting hazards to health     MRSA (methicillin resistant staph aureus) culture positive     MRSA (methicillin resistant Staphylococcus aureus)     Face    Noncompliance with medication regimen     Second hand smoke exposure     Seizure ([address]     Seizures ([address] 2006 or 2007    one episode during long-term        INITIAL DX:   DKA (diabetic ketoacidosis) ([address] [E11.10]  FELECIA (acute kidney injury) ([address] [N17.9]  UTI (urinary tract infection) [N39.0]     Current Treatment     TX: ABx. Insulin( transitioning off gluco-stabilizer), GI prophylaxis. Anti-nausea medication. Clot Prevention. IVF    Consulted by Provider for advanced diabetes nursing assessment and care for:   [x] Transitioning off Swapna    [x] Inpatient management strategy  [x] Home management assessment  [x] Survival skill education    Hospital Course   Clinical progress has been complicated by DKA    Diabetes History   The patient reports a 20 year history of Type 1 diabetes. He has a positive family hx of diabetes (mom & niece). He reports taking 30 units of Lantus daily and 10 ( sliding scale) units of Humalog with meals.  He follows with a PCP, Mouna Key MD, for diabetes management. Diabetes-related Medical History  Acute complications  DKA  Neurological complications  Peripheral neuropathy  Microvascular disease  Retinopathy    Diabetes Medication History  Key Antihyperglycemic Medications             insulin glargine (Lantus Solostar U-100 Insulin) 100 unit/mL (3 mL) inpn INJECT 30 UNITS SUBCUTANEOUSLY DAILY    insulin aspart U-100 (NovoLOG Flexpen U-100 Insulin) 100 unit/mL (3 mL) inpn 10 Units by SubCUTAneous route Before breakfast, lunch, and dinner. (Novolog or Humalog, whichever more preferred: Inject 5 units with each meal + correction insulin, up to 30 units per day           Taking medications pattern  [x]? Consistent administration  [x]? Affordable  Social determinants of health impacting diabetes self-management practices   Concerned that you need to know more about how to stay healthy with diabetes  Overall evaluation:               [x]? Not achieving A1c target with drug therapy & self-care practices    Subjective   I was throwing up for 8 days     Objective   Physical exam  General Normal weight male; ill-appearing. Conversant and cooperative  Neuro  Alert, oriented   Vital Signs   Visit Vitals  BP (!) 175/94   Pulse 94   Temp 97.7 °F (36.5 °C)   Resp 17   Ht 5' 9\" (1.753 m)   Wt 70.8 kg (156 lb)   SpO2 100%   BMI 23.04 kg/m²   . Laboratory  Recent Labs     03/09/22  0701 03/09/22  0201 03/08/22  2033 03/08/22  1423 03/08/22  1423   * 119* 390*   < > 601*   AGAP 12 11 27*   < > 25*   WBC  --   --   --   --  10.9   CREA 2.09* 2.07* 2.14*   < > 1.94*   GFRNA 36* 36* 35*   < > 39*   AST  --   --   --   --  10*   ALT  --   --   --   --  16    < > = values in this interval not displayed.        Factors impacting BG management  Factor Dose Comments   Nutrition:  Standard meals     60 grams/meal      Pain Prn morphine    Infection Rocephin 1gm q 24 hrs    Other:   Kidney function  Liver function     GFR 36  AST 10      Blood glucose pattern      Significant diabetes-related events over the past 24-72 hours  Transitioning off gluco-stabilzer this afternoon    Assessment and Plan   Nursing Diagnosis Risk for unstable blood glucose pattern   Nursing Intervention Domain 7037 Decision-making Support   Nursing Interventions Examined current inpatient diabetes/blood glucose control   Explored factors facilitating and impeding inpatient management  Explored corrective strategies with patient and responsible inpatient provider   Informed patient of rational for insulin strategy while hospitalized       Evaluation   This 46year old  male with Type 1 diabetes has not maintained BG control prior to admission as evidence of an A1c of 11.4%. The patient has multiple ER visits and admissions for hyperglycemia/DKA in the past year. The patient was last admitted one month ago. The patient expected to transition off gluco-stabilizer this afternoon on 15 units of Lantus. He is scheduled to receive his meal tray this morning. He still complains of nausea and has a hx of gastroparesis. The patient reports that his home dose is 30 units Lantus daily. On his last admission he did well on 35 units Lantus daily and meal time insulin. Diabetes management will continue to follow with this patient, monitoring BG trends and making recommendations as needed. This patient would benefit from diabetes self-management education and support [facility]) after discharge. Recommendations     [x] Use of Subcutaneous Insulin Order set (4611)  Insulin Dosing Specific recommendation   Basal                                      (Based on weight, BMI & GFR) []        0.2 units/kg/D  [] 0.3 units/kg/D  [x] 0.5 units/kg/D  Start 35 units Lantus daily starting tomorrow.     Nutritional                                      (Based on CHO/dextrose load) [x] Normal sensitivity  [] Insulin-resistant sensitivity Consider adding meal time Humalog once eating   Corrective (Useful in adjusting insulin dosing) [] Normal sensitivity  [x] HIGH sensitivity  [] Insulin-resistant sensitivity   Switch to High sensitivity scale         [x] Referral to   [facility] (Phone [phone] to schedule appointment) for [facility]      Billing Code(s)   [x] 24811 IP subsequent hospital care - 35 minutes [] 08850 Prolonged Services - 65 minutes [] 52303 Prolonged Services - 110 minutes  [] 99846 IP subsequent hospital care - 25 minutes [] 97931 Prolonged Services - 55 minutes [] 13491 Prolonged Services - 100 minutes  [] 42935 IP subsequent hospital care - 15 minutes [] 03295 Prolonged Services - 45 minutes [] 88518 Prolonged Services - 90 minutes    Before making these care recommendations, I personally reviewed the hospitalization record, including notes, laboratory & diagnostic data and current medications, and examined the patient at the bedside (circumstances permitting) before making care recommendations. More than fifty (50) percent of the time was spent in patient counseling and/or care coordination.   Total minutes: 35 minutes    ANEESH Yeager  Diabetes Clinical Nurse Specialist  [facility]  Access via [employer] show

## (undated) DEVICE — ELECTRODE,RADIOTRANSLUCENT,FOAM,5PK: Brand: MEDLINE

## (undated) DEVICE — SUTURE PROL SZ 5-0 L24IN NONABSORBABLE BLU RB-2 L13IN 1/2 8554H

## (undated) DEVICE — SYR 3ML LL TIP 1/10ML GRAD --

## (undated) DEVICE — 1200 GUARD II KIT W/5MM TUBE W/O VAC TUBE: Brand: GUARDIAN

## (undated) DEVICE — Device

## (undated) DEVICE — Z INACTIVE USE 2854097 SPONGE GZ W4XL4IN COT 12 PLY TYP VII WVN C FLD DSGN

## (undated) DEVICE — GAUZE,SPONGE,2"X2",8PLY,STERILE,LF,2'S: Brand: MEDLINE

## (undated) DEVICE — BRUSH CYTO BRONCHSCP 1.5/140MM -- CELLEBRITY

## (undated) DEVICE — Z DISCONTINUED PER MEDLINE LINE GAS SAMPLING O2/CO2 LNG AD 13 FT NSL W/ TBNG FILTERLINE

## (undated) DEVICE — NEONATAL-ADULT SPO2 SENSOR: Brand: NELLCOR

## (undated) DEVICE — SYR 10ML LUER LOK 1/5ML GRAD --

## (undated) DEVICE — SOLIDIFIER MEDC 1200ML -- CONVERT TO 356117

## (undated) DEVICE — SUTURE VCRL SZ 3-0 L27IN ABSRB UD L26MM SH 1/2 CIR J416H

## (undated) DEVICE — SOLUTION IV 500ML 0.9% SOD CHL PH 5 INJ USP VIAFLX PLAS

## (undated) DEVICE — BLOCK BITE ENDOSCP AD 21 MM W/ DIL BLU LF DISP

## (undated) DEVICE — BLADE ASSEMB CLP HAIR FINE --

## (undated) DEVICE — RADIFOCUS GLIDEWIRE: Brand: GLIDEWIRE

## (undated) DEVICE — SYRINGE MED 3ML CLR PLAS STD N CTRL LUERLOCK TIP DISP

## (undated) DEVICE — FORCEPS BX L160CM DIA8MM GRSP DISECT CUP TIP NONLOCKING ROT

## (undated) DEVICE — FOGARTY THRU-LUMEN EMBOLECTOMY CATHETER 5.5F 80CM: Brand: FOGARTY

## (undated) DEVICE — SET ADMIN 16ML TBNG L100IN 2 Y INJ SITE IV PIGGY BK DISP

## (undated) DEVICE — MEDI-VAC YANK SUCT HNDL W/TPRD BULBOUS TIP: Brand: CARDINAL HEALTH

## (undated) DEVICE — CONTAINER SPEC 20 ML LID NEUT BUFF FORMALIN 10 % POLYPR STS

## (undated) DEVICE — INFLATION DEVICE: Brand: ENCORE™ 26

## (undated) DEVICE — HYPODERMIC SAFETY NEEDLE: Brand: MAGELLAN

## (undated) DEVICE — SOLUTION IRRIG 1000ML STRL H2O USP PLAS POUR BTL

## (undated) DEVICE — GLOVE SURG SZ 8 CRM LTX FREE POLYISOPRENE POLYMER BEAD ANTI

## (undated) DEVICE — YANKAUER,TAPERED BULBOUS TIP,W/O VENT: Brand: MEDLINE

## (undated) DEVICE — FOGARTY ARTERIAL EMBOLECTOMY CATHETER 4F 80CM: Brand: FOGARTY

## (undated) DEVICE — CATH IV AUTOGRD BC PNK 20GA 25 -- INSYTE

## (undated) DEVICE — GOWN,SIRUS,NONRNF,SETINSLV,2XL,18/CS: Brand: MEDLINE

## (undated) DEVICE — PTA BALLOON DILATATION CATHETER: Brand: MUSTANG™

## (undated) DEVICE — AGENT HEMSTAT W4XL4IN OXIDIZED REGENERATED CELOS ABSRB SFT

## (undated) DEVICE — C-ARM: Brand: UNBRANDED

## (undated) DEVICE — SET ADMIN 16ML TBNG L100IN 2 Y INJ SITE IV PIGGY BK DISP (ORDER IN MULIPLES OF 48)

## (undated) DEVICE — TOWEL 4 PLY TISS 19X30 SUE WHT

## (undated) DEVICE — BASIN EMSIS 16OZ GRAPHITE PLAS KID SHP MOLD GRAD FOR ORAL

## (undated) DEVICE — KENDALL RADIOLUCENT FOAM MONITORING ELECTRODE RECTANGULAR SHAPE: Brand: KENDALL

## (undated) DEVICE — PROBE VASC 8MHZ WTRPRF

## (undated) DEVICE — PENCIL SMK EVAC 10 FT BLADE ELECTRD ROCKER FOR TELSCP

## (undated) DEVICE — GLOVE ORANGE PI 7   MSG9070

## (undated) DEVICE — HANDLE LT SNAP ON ULT DURABLE LENS FOR TRUMPF ALC DISPOSABLE

## (undated) DEVICE — SOLIDIFIER FLD 2OZ 1500CC N DISINF IN BTL DISP SAFESORB

## (undated) DEVICE — SUTURE NONABSORBABLE MONOFILAMENT 4-0 PS-2 18 IN BLK ETHILON 1667G

## (undated) DEVICE — BAG SPEC BIOHZRD 10 X 10 IN --

## (undated) DEVICE — SUTURE PROL SZ 6-0 L24IN NONABSORBABLE BLU L9.3MM BV-1 3/8 8805H

## (undated) DEVICE — 3M™ MEDIPORE™ H SOFT CLOTH SURGICAL TAPE 2864, 4 INCH X 10 YARD (10CM X 9,14M), 12 ROLLS/CASE: Brand: 3M™ MEDIPORE™

## (undated) DEVICE — AV FISTULA - MRMC: Brand: MEDLINE INDUSTRIES, INC.

## (undated) DEVICE — NEEDLE HYPO 18GA L1.5IN PNK S STL HUB POLYPR SHLD REG BVL

## (undated) DEVICE — PERCUTANEOUS ENTRY THINWALL NEEDLE  ONE-PART: Brand: COOK

## (undated) DEVICE — GARMENT,MEDLINE,DVT,INT,CALF,MED, GEN2: Brand: MEDLINE